# Patient Record
Sex: FEMALE | Race: BLACK OR AFRICAN AMERICAN | Employment: UNEMPLOYED | URBAN - METROPOLITAN AREA
[De-identification: names, ages, dates, MRNs, and addresses within clinical notes are randomized per-mention and may not be internally consistent; named-entity substitution may affect disease eponyms.]

---

## 2017-01-04 ENCOUNTER — OFFICE VISIT (OUTPATIENT)
Dept: NEUROLOGY | Age: 32
End: 2017-01-04

## 2017-01-04 VITALS
DIASTOLIC BLOOD PRESSURE: 82 MMHG | HEIGHT: 65 IN | WEIGHT: 198 LBS | SYSTOLIC BLOOD PRESSURE: 140 MMHG | RESPIRATION RATE: 20 BRPM | BODY MASS INDEX: 32.99 KG/M2

## 2017-01-04 DIAGNOSIS — G89.29 CHRONIC BILATERAL LOW BACK PAIN WITHOUT SCIATICA: Primary | ICD-10-CM

## 2017-01-04 DIAGNOSIS — M54.50 CHRONIC BILATERAL LOW BACK PAIN WITHOUT SCIATICA: Primary | ICD-10-CM

## 2017-01-04 RX ORDER — OXYCODONE HYDROCHLORIDE 5 MG/1
TABLET ORAL
Qty: 40 TAB | Refills: 0 | Status: SHIPPED | OUTPATIENT
Start: 2017-01-04 | End: 2017-02-01 | Stop reason: SDUPTHER

## 2017-01-04 NOTE — MR AVS SNAPSHOT
Visit Information Date & Time Provider Department Dept. Phone Encounter #  
 1/4/2017 11:00 AM Ethan Lao MD Neurology [place] [address] [phone] [id number] Upcoming Health Maintenance Date Due DTaP/Tdap/Td series (1 - Tdap) 2/17/2007 Pneumococcal 19-64 Highest Risk (2 of 3 - PCV13) 10/1/2010 PAP AKA CERVICAL CYTOLOGY 4/13/2019 Allergies as of 1/4/2017  Review Complete On: 1/4/2017 By: Mando Mackey LPN Severity Noted Reaction Type Reactions Compazine [Prochlorperazine Edisylate] High 07/11/2016   Systemic Nausea and Vomiting, Palpitations Current Immunizations  Reviewed on 9/19/2014 Name Date Influenza Vaccine 9/9/2012 Influenza Vaccine Split 9/1/2011 Pneumococcal Vaccine (Unspecified Type) 10/1/2009 Not reviewed this visit You Were Diagnosed With   
  
 Codes Comments Chronic bilateral low back pain without sciatica    -  Primary ICD-10-CM: M54.5, G89.29 ICD-9-CM: 724.2, 338.29 Vitals BP Resp Height(growth percentile) Weight(growth percentile) BMI OB Status 140/82 20 5' 5\" (1.651 m) 198 lb (89.8 kg) 32.95 kg/m2 Having regular periods Smoking Status Never Smoker Vitals History BMI and BSA Data Body Mass Index Body Surface Area 32.95 kg/m 2 2.03 m 2 Preferred Pharmacy Pharmacy Name Phone [address], [address] Your Updated Medication List  
  
   
This list is accurate as of: 1/4/17 11:37 AM.  Always use your most recent med list.  
  
  
  
  
 allopurinol 100 mg tablet Commonly known as:  Reji Mcintosh Take 100 mg by mouth daily. amLODIPine 10 mg tablet Commonly known as:  Freedom Trevizo Take 1 Tab by mouth daily. AURYXIA 210 mg iron tablet Generic drug:  ferric citrate TK 2 TS PO WITH MEALS  
  
 calcitRIOL 0.25 mcg capsule Commonly known as:  ROCALTROL Take 0.25 mcg by mouth daily. cloNIDine HCl 0.3 mg tablet Commonly known as:  CATAPRES Take 1 Tab by mouth two (2) times a day. For blood pressure  
  
 cyanocobalamin 2,500 mcg sublingual tablet Commonly known as:  VITAMIN B-12 Take 1 Tab by mouth daily. furosemide 40 mg tablet Commonly known as:  LASIX Take 1 Tab by mouth daily. For fluid  
  
 losartan 50 mg tablet Commonly known as:  COZAAR Take 1 Tab by mouth daily. For blood pressure  
  
 metoprolol succinate 100 mg tablet Commonly known as:  TOPROL-XL Take 1 Tab by mouth daily. For blood pressure  
  
 ondansetron hcl 8 mg tablet Commonly known as:  Carter Collet Take 1 Tab by mouth every eight (8) hours as needed. For Nausea  Indications: GASTROPARESIS  
  
 oxyCODONE IR 5 mg immediate release tablet Commonly known as:  Chares Ravel Take 1 tab in AM and HALF to 1 tab in PM if needed on days where back pain is severe  
  
 pantoprazole 40 mg tablet Commonly known as:  PROTONIX Take 1 Tab by mouth Daily (before breakfast). PLAQUENIL 200 mg tablet Generic drug:  hydroxychloroquine Take 200 mg by mouth two (2) times a day. predniSONE 10 mg tablet Commonly known as:  Mary Richard Take 9 mg by mouth daily (with breakfast). Senna 8.6 mg tablet Generic drug:  senna Take 1 Tab by mouth daily as needed. SENSIPAR 30 mg tablet Generic drug:  cinacalcet Take 30 mg by mouth daily. VITAMIN D2 50,000 unit capsule Generic drug:  ergocalciferol Take 50,000 Units by mouth every seven (7) days. warfarin 1 mg tablet Commonly known as:  COUMADIN Take 2 Tabs by mouth every evening. Starting Friday, 7/15/16  Indications: DEEP VEIN THROMBOSIS PREVENTION Prescriptions Printed Refills  
 oxyCODONE IR (ROXICODONE) 5 mg immediate release tablet 0 Sig: Take 1 tab in AM and HALF to 1 tab in PM if needed on days where back pain is severe 
   
 Class: Print Patient Instructions Chuy Farley 1721 What is a living will? A living will is a legal form you use to write down the kind of care you want at the end of your life. It is used by the health professionals who will treat you if you aren't able to decide for yourself. If you put your wishes in writing, your loved ones and others will know what kind of care you want. They won't need to guess. This can ease your mind and be helpful to others. A living will is not the same as an estate or property will. An estate will explains what you want to happen with your money and property after you die. Is a living will a legal document? A living will is a legal document. Each state has its own laws about living reza. If you move to another state, make sure that your living will is legal in the state where you now live. Or you might use a universal form that has been approved by many states. This kind of form can sometimes be completed and stored online. Your electronic copy will then be available wherever you have a connection to the Internet. In most cases, doctors will respect your wishes even if you have a form from a different state. · You don't need an  to complete a living will. But legal advice can be helpful if your state's laws are unclear, your health history is complicated, or your family can't agree on what should be in your living will. · You can change your living will at any time. Some people find that their wishes about end-of-life care change as their health changes. · In addition to making a living will, think about completing a medical power of  form. This form lets you name the person you want to make end-of-life treatment decisions for you (your \"health care agent\") if you're not able to. Many hospitals and nursing homes will give you the forms you need to complete a living will and a medical power of . · Your living will is used only if you can't make or communicate decisions for yourself anymore. If you become able to make decisions again, you can accept or refuse any treatment, no matter what you wrote in your living will. · Your state may offer an online registry. This is a place where you can store your living will online so the doctors and nurses who need to treat you can find it right away. What should you think about when creating a living will? Talk about your end-of-life wishes with your family members and your doctor. Let them know what you want. That way the people making decisions for you won't be surprised by your choices. Think about these questions as you make your living will: · Do you know enough about life support methods that might be used? If not, talk to your doctor so you know what might be done if you can't breathe on your own, your heart stops, or you're unable to swallow. · What things would you still want to be able to do after you receive life-support methods? Would you want to be able to walk? To speak? To eat on your own? To live without the help of machines? · If you have a choice, where do you want to be cared for? In your home? At a hospital or nursing home? · Do you want certain Uatsdin practices performed if you become very ill? · If you have a choice at the end of your life, where would you prefer to die? At home? In a hospital or nursing home? Somewhere else? · Would you prefer to be buried or cremated? · Do you want your organs to be donated after you die? What should you do with your living will? · Make sure that your family members and your health care agent have copies of your living will. · Give your doctor a copy of your living will to keep in your medical record. If you have more than one doctor, make sure that each one has a copy. · You may want to put a copy of your living will where it can be easily found. Where can you learn more? Go to http://jorge-rochelle.info/. Enter K785 in the search box to learn more about \"Learning About Living Jeanne. \" Current as of: February 24, 2016 Content Version: 11.1 © 5356-1978 [employer]. Care instructions adapted under license by [employer] (which disclaims liability or warranty for this information). If you have questions about a medical condition or this instruction, always ask your healthcare professional. [address] any warranty or liability for your use of this information. Advance Directives: Care Instructions Your Care Instructions An advance directive is a legal way to state your wishes at the end of your life. It tells your family and your doctor what to do if you can no longer say what you want. There are two main types of advance directives. You can change them any time that your wishes change. · A living will tells your family and your doctor your wishes about life support and other treatment. · A medical power of  lets you name a person to make treatment decisions for you when you can't speak for yourself. This person is called a health care agent. If you do not have an advance directive, decisions about your medical care may be made by a doctor or a  who doesn't know you. It may help to think of an advance directive as a gift to the people who care for you. If you have one, they won't have to make tough decisions by themselves. Follow-up care is a key part of your treatment and safety. Be sure to make and go to all appointments, and call your doctor if you are having problems. It's also a good idea to know your test results and keep a list of the medicines you take. How can you care for yourself at home? · Discuss your wishes with your loved ones and your doctor. This way, there are no surprises. · Many states have a unique form.  Or you might use a universal form that has been approved by many states. This kind of form can sometimes be completed and stored online. Your electronic copy will then be available wherever you have a connection to the Internet. In most cases, doctors will respect your wishes even if you have a form from a different state. · You don't need a  to do an advance directive. But you may want to get legal advice. · Think about these questions when you prepare an advance directive: ¨ Who do you want to make decisions about your medical care if you are not able to? Many people choose a family member, close friend, or doctor. ¨ Do you know enough about life support methods that might be used? If not, talk to your doctor so you understand. ¨ What are you most afraid of that might happen? You might be afraid of having pain, losing your independence, or being kept alive by machines. ¨ Where would you prefer to die? Choices include your home, a hospital, or a nursing home. ¨ Would you like to have information about hospice care to support you and your family? ¨ Do you want to donate organs when you die? ¨ Do you want certain Yarsanism practices performed before you die? If so, put your wishes in the advance directive. · Read your advance directive every year, and make changes as needed. When should you call for help? Be sure to contact your doctor if you have any questions. Where can you learn more? Go to http://jorge-rochelle.info/. Enter R264 in the search box to learn more about \"Advance Directives: Care Instructions. \" Current as of: February 24, 2016 Content Version: 11.1 © 0196-6225 [employer]. Care instructions adapted under license by [employer] (which disclaims liability or warranty for this information).  If you have questions about a medical condition or this instruction, always ask your healthcare professional. [address] any warranty or liability for your use of this information. Introducing [facility]! Dalila Junior introduces [employer] patient portal. Now you can access parts of your medical record, email your doctor's office, and request medication refills online. 1. In your internet browser, go to [email or web address] [email or web address] Ge[employer] 2. Click on the First Time User? Click Here link in the Sign In box. You will see the New Member Sign Up page. 3. Enter your [employer] Access Code exactly as it appears below. You will not need to use this code after youve completed the sign-up process. If you do not sign up before the expiration date, you must request a new code. · [employer] Access Code: [email or web address] Expires: 4/4/2017 11:37 AM 
 
4. Enter the last four digits of your Social Security Number (xxxx) and Date of Birth (mm/dd/yyyy) as indicated and click Submit. You will be taken to the next sign-up page. 5. Create a [employer] ID. This will be your [employer] login ID and cannot be changed, so think of one that is secure and easy to remember. 6. Create a [employer] password. You can change your password at any time. 7. Enter your Password Reset Question and Answer. This can be used at a later time if you forget your password. 8. Enter your e-mail address. You will receive e-mail notification when new information is available in [address] 9. Click Sign Up. You can now view and download portions of your medical record. 10. Click the Download Summary menu link to download a portable copy of your medical information. If you have questions, please visit the Frequently Asked Questions section of the [employer] website. Remember, [employer] is NOT to be used for urgent needs. For medical emergencies, dial 911. Now available from your iPhone and Android! Please provide this summary of care documentation to your next provider. Your primary care clinician is listed as Patti Rodriguez.  If you have any questions after today's visit, please call [phone].

## 2017-01-04 NOTE — PROGRESS NOTES
Interval HPI:     This is a 28 y.o. female who is following up for     PMHx: chronic back pain, hx of fibromyalgia, hx of DVT/ coumadin, hx of Lupus, mild lower lumbar disc degeneration (L4-5 and L5-S1 with small annular tear at L5-S1)    Chief Complaint   Patient presents with    Pain (Chronic)     Last visit, changed from Oxycodone IR 5 mg to Percocet 5/ 325. She says the combination makes her sleepy and she noticed the same thing about taking tylenol in the past.  Says the pain was similarly controlled as compared to the Oxycodone IR 5 mg tablet. Ran out of last Rx 2 days ago. Taking 1 tabs most days, occasionally taking an extra HALF to 1 tab. UDS checked at last visit; consistent with rx pain medications.  reviewed: only filling pain Rx from my office. Tried/ failed: Gabapentin, Cymbalta, Amitriptyline (only helped get to sleep), Lyrica (abnormal behaviors)    Brief ROS: as above  There have been no significant changes in PMHx, PSHx, SHx except as noted above. Allergies   Allergen Reactions    Compazine [Prochlorperazine Edisylate] Nausea and Vomiting and Palpitations     Current Outpatient Prescriptions   Medication Sig Dispense Refill    oxyCODONE IR (ROXICODONE) 5 mg immediate release tablet Take 1 tab in AM and HALF to 1 tab in PM if needed on days where back pain is severe    40 Tab 0    amLODIPine (NORVASC) 10 mg tablet Take 1 Tab by mouth daily. 30 Tab 5    losartan (COZAAR) 50 mg tablet Take 1 Tab by mouth daily. For blood pressure 30 Tab 5    cloNIDine HCl (CATAPRES) 0.3 mg tablet Take 1 Tab by mouth two (2) times a day. For blood pressure 60 Tab 5    metoprolol succinate (TOPROL-XL) 100 mg tablet Take 1 Tab by mouth daily. For blood pressure 30 Tab 5    furosemide (LASIX) 40 mg tablet Take 1 Tab by mouth daily. For fluid 30 Tab 5    cinacalcet (SENSIPAR) 30 mg tablet Take 30 mg by mouth daily.       AURYXIA 210 mg iron tablet TK 2 TS PO WITH MEALS  12    predniSONE (DELTASONE) 10 mg tablet Take 9 mg by mouth daily (with breakfast).  ergocalciferol (VITAMIN D2) 50,000 unit capsule Take 50,000 Units by mouth every seven (7) days.  hydroxychloroquine (PLAQUENIL) 200 mg tablet Take 200 mg by mouth two (2) times a day.  warfarin (COUMADIN) 1 mg tablet Take 2 Tabs by mouth every evening. Starting Friday, 7/15/16  Indications: DEEP VEIN THROMBOSIS PREVENTION 60 Tab 0    ondansetron hcl (ZOFRAN) 8 mg tablet Take 1 Tab by mouth every eight (8) hours as needed. For Nausea  Indications: GASTROPARESIS 15 Tab 1    allopurinol (ZYLOPRIM) 100 mg tablet Take 100 mg by mouth daily.  cyanocobalamin (VITAMIN B-12) 2,500 mcg sublingual tablet Take 1 Tab by mouth daily. 90 Tab 1    pantoprazole (PROTONIX) 40 mg tablet Take 1 Tab by mouth Daily (before breakfast). 30 Tab 0    calcitRIOL (ROCALTROL) 0.25 mcg capsule Take 0.25 mcg by mouth daily. 5    senna (SENNA) 8.6 mg tablet Take 1 Tab by mouth daily as needed. Physical Exam  Blood pressure 140/82, resp. rate 20, height 5' 5\" (1.651 m), weight 89.8 kg (198 lb). No acute distress  CV: regular rate, rhythm  Lungs: CTA    MSK:  Lumbar spine:   + tenderness across lower back   Mild pain with back flexion and extension  No significant worsening with facet loading to either sides     Focused Neurological Exam     Mental status: Alert and oriented to person, place situation  CNs: EOMI, Face symmetric, Hearing/ Language normal  Sensory: intact light touch in both legs  Motor: Normal bulk and strength in all 4 extremities. Reflexes: Patellars 1+ symmetric  Gait: antalgic    Impression    ICD-10-CM ICD-9-CM    1. Chronic bilateral low back pain without sciatica M54.5 724.2 oxyCODONE IR (ROXICODONE) 5 mg immediate release tablet    G89.29 338.29        Changed back to Oxycodone IR 5 mg tablet (#40) from Percocet 5/ 325 due to complaints of excessive sedation on Percocet.   Pt advised that she can take 1 tab in AM and occasionally HALF to one tab in evenings if pain is severe.    F/u in 4 weeks

## 2017-01-04 NOTE — PROGRESS NOTES
Reviewed record in preparation for visit and have necessary documentation  Pt did not bring medication to office visit for review  Medication list reviewed and reconciled with patient  Information was given to pt on Advanced Directives, Living Will  opportunity was given for questions  Pill count =      Patient didn't bring medication bottle to visit    Patient reports taking medication  Take 1 tab in AM and HALF to 1 tab in PM if needed on days where back pain is severe  UDS obtained and sent = no  Pain contract = on file   made available for  Dr Tyrus Goldmann

## 2017-01-04 NOTE — PATIENT INSTRUCTIONS
Learning About Living Jeanne  What is a living will? A living will is a legal form you use to write down the kind of care you want at the end of your life. It is used by the health professionals who will treat you if you aren't able to decide for yourself. If you put your wishes in writing, your loved ones and others will know what kind of care you want. They won't need to guess. This can ease your mind and be helpful to others. A living will is not the same as an estate or property will. An estate will explains what you want to happen with your money and property after you die. Is a living will a legal document? A living will is a legal document. Each state has its own laws about living reza. If you move to another state, make sure that your living will is legal in the state where you now live. Or you might use a universal form that has been approved by many states. This kind of form can sometimes be completed and stored online. Your electronic copy will then be available wherever you have a connection to the Internet. In most cases, doctors will respect your wishes even if you have a form from a different state. · You don't need an  to complete a living will. But legal advice can be helpful if your state's laws are unclear, your health history is complicated, or your family can't agree on what should be in your living will. · You can change your living will at any time. Some people find that their wishes about end-of-life care change as their health changes. · In addition to making a living will, think about completing a medical power of  form. This form lets you name the person you want to make end-of-life treatment decisions for you (your \"health care agent\") if you're not able to. Many hospitals and nursing homes will give you the forms you need to complete a living will and a medical power of .   · Your living will is used only if you can't make or communicate decisions for yourself anymore. If you become able to make decisions again, you can accept or refuse any treatment, no matter what you wrote in your living will. · Your state may offer an online registry. This is a place where you can store your living will online so the doctors and nurses who need to treat you can find it right away. What should you think about when creating a living will? Talk about your end-of-life wishes with your family members and your doctor. Let them know what you want. That way the people making decisions for you won't be surprised by your choices. Think about these questions as you make your living will:  · Do you know enough about life support methods that might be used? If not, talk to your doctor so you know what might be done if you can't breathe on your own, your heart stops, or you're unable to swallow. · What things would you still want to be able to do after you receive life-support methods? Would you want to be able to walk? To speak? To eat on your own? To live without the help of machines? · If you have a choice, where do you want to be cared for? In your home? At a hospital or nursing home? · Do you want certain Rastafari practices performed if you become very ill? · If you have a choice at the end of your life, where would you prefer to die? At home? In a hospital or nursing home? Somewhere else? · Would you prefer to be buried or cremated? · Do you want your organs to be donated after you die? What should you do with your living will? · Make sure that your family members and your health care agent have copies of your living will. · Give your doctor a copy of your living will to keep in your medical record. If you have more than one doctor, make sure that each one has a copy. · You may want to put a copy of your living will where it can be easily found. Where can you learn more? Go to http://jorge-rochelle.info/.   Enter S797 in the search box to learn more about \"Learning About Living Constancia Person. \"  Current as of: February 24, 2016  Content Version: 11.1  © 6120-8901 [employer]. Care instructions adapted under license by [employer] (which disclaims liability or warranty for this information). If you have questions about a medical condition or this instruction, always ask your healthcare professional. [address] any warranty or liability for your use of this information. Advance Directives: Care Instructions  Your Care Instructions  An advance directive is a legal way to state your wishes at the end of your life. It tells your family and your doctor what to do if you can no longer say what you want. There are two main types of advance directives. You can change them any time that your wishes change. · A living will tells your family and your doctor your wishes about life support and other treatment. · A medical power of  lets you name a person to make treatment decisions for you when you can't speak for yourself. This person is called a health care agent. If you do not have an advance directive, decisions about your medical care may be made by a doctor or a  who doesn't know you. It may help to think of an advance directive as a gift to the people who care for you. If you have one, they won't have to make tough decisions by themselves. Follow-up care is a key part of your treatment and safety. Be sure to make and go to all appointments, and call your doctor if you are having problems. It's also a good idea to know your test results and keep a list of the medicines you take. How can you care for yourself at home? · Discuss your wishes with your loved ones and your doctor. This way, there are no surprises. · Many states have a unique form. Or you might use a universal form that has been approved by many states. This kind of form can sometimes be completed and stored online.  Your electronic copy will then be available wherever you have a connection to the Internet. In most cases, doctors will respect your wishes even if you have a form from a different state. · You don't need a  to do an advance directive. But you may want to get legal advice. · Think about these questions when you prepare an advance directive:  ¨ Who do you want to make decisions about your medical care if you are not able to? Many people choose a family member, close friend, or doctor. ¨ Do you know enough about life support methods that might be used? If not, talk to your doctor so you understand. ¨ What are you most afraid of that might happen? You might be afraid of having pain, losing your independence, or being kept alive by machines. ¨ Where would you prefer to die? Choices include your home, a hospital, or a nursing home. ¨ Would you like to have information about hospice care to support you and your family? ¨ Do you want to donate organs when you die? ¨ Do you want certain Scientologist practices performed before you die? If so, put your wishes in the advance directive. · Read your advance directive every year, and make changes as needed. When should you call for help? Be sure to contact your doctor if you have any questions. Where can you learn more? Go to http://jorge-rochelle.info/. Enter R264 in the search box to learn more about \"Advance Directives: Care Instructions. \"  Current as of: February 24, 2016  Content Version: 11.1  © 5810-0768 [employer]. Care instructions adapted under license by [employer] (which disclaims liability or warranty for this information). If you have questions about a medical condition or this instruction, always ask your healthcare professional. [address] any warranty or liability for your use of this information.

## 2017-01-09 ENCOUNTER — DOCUMENTATION ONLY (OUTPATIENT)
Dept: FAMILY MEDICINE CLINIC | Age: 32
End: 2017-01-09

## 2017-01-09 NOTE — PROGRESS NOTES
Fax received from Rx Work completed, signed, dated and faxed back to [phone]. Forms and fax confirmation sent to scan.

## 2017-02-01 ENCOUNTER — PATIENT OUTREACH (OUTPATIENT)
Dept: FAMILY MEDICINE CLINIC | Age: 32
End: 2017-02-01

## 2017-02-01 ENCOUNTER — OFFICE VISIT (OUTPATIENT)
Dept: NEUROLOGY | Age: 32
End: 2017-02-01

## 2017-02-01 VITALS
WEIGHT: 198 LBS | BODY MASS INDEX: 32.99 KG/M2 | HEIGHT: 65 IN | RESPIRATION RATE: 20 BRPM | SYSTOLIC BLOOD PRESSURE: 140 MMHG | DIASTOLIC BLOOD PRESSURE: 70 MMHG

## 2017-02-01 DIAGNOSIS — G89.29 CHRONIC BILATERAL LOW BACK PAIN WITHOUT SCIATICA: Primary | ICD-10-CM

## 2017-02-01 DIAGNOSIS — M54.50 CHRONIC BILATERAL LOW BACK PAIN WITHOUT SCIATICA: Primary | ICD-10-CM

## 2017-02-01 RX ORDER — OXYCODONE HYDROCHLORIDE 5 MG/1
TABLET ORAL
Qty: 40 TAB | Refills: 0 | Status: SHIPPED | OUTPATIENT
Start: 2017-02-01 | End: 2017-03-01 | Stop reason: SDUPTHER

## 2017-02-01 NOTE — MR AVS SNAPSHOT
Visit Information Date & Time Provider Department Dept. Phone Encounter #  
 2/1/2017  8:40 AM Selam Junior MD Neurology [facility] [phone] [id number] Upcoming Health Maintenance Date Due DTaP/Tdap/Td series (1 - Tdap) 2/17/2007 Pneumococcal 19-64 Highest Risk (2 of 3 - PCV13) 10/1/2010 PAP AKA CERVICAL CYTOLOGY 4/13/2019 Allergies as of 2/1/2017  Review Complete On: 1/4/2017 By: Daisy Mendoza LPN Severity Noted Reaction Type Reactions Compazine [Prochlorperazine Edisylate] High 07/11/2016   Systemic Nausea and Vomiting, Palpitations Current Immunizations  Reviewed on 9/19/2014 Name Date Influenza Vaccine 9/9/2012 Influenza Vaccine Split 9/1/2011 Pneumococcal Vaccine (Unspecified Type) 10/1/2009 Not reviewed this visit You Were Diagnosed With   
  
 Codes Comments Chronic bilateral low back pain without sciatica    -  Primary ICD-10-CM: M54.5, G89.29 ICD-9-CM: 724.2, 338.29 Vitals BP Resp Height(growth percentile) Weight(growth percentile) BMI OB Status 140/70 20 5' 5\" (1.651 m) 198 lb (89.8 kg) 32.95 kg/m2 Having regular periods Smoking Status Never Smoker Vitals History BMI and BSA Data Body Mass Index Body Surface Area 32.95 kg/m 2 2.03 m 2 Preferred Pharmacy Pharmacy Name Phone [address], [address] Your Updated Medication List  
  
   
This list is accurate as of: 2/1/17  9:17 AM.  Always use your most recent med list.  
  
  
  
  
 allopurinol 100 mg tablet Commonly known as:  Acquanetta Bunk Take 100 mg by mouth daily. amLODIPine 10 mg tablet Commonly known as:  Paiz Del Take 1 Tab by mouth daily. AURYXIA 210 mg iron tablet Generic drug:  ferric citrate TK 2 TS PO WITH MEALS  
  
 calcitRIOL 0.25 mcg capsule Commonly known as:  ROCALTROL Take 0.25 mcg by mouth daily. cloNIDine HCl 0.3 mg tablet Commonly known as:  CATAPRES Take 1 Tab by mouth two (2) times a day. For blood pressure  
  
 cyanocobalamin 2,500 mcg sublingual tablet Commonly known as:  VITAMIN B-12 Take 1 Tab by mouth daily. furosemide 40 mg tablet Commonly known as:  LASIX Take 1 Tab by mouth daily. For fluid  
  
 losartan 50 mg tablet Commonly known as:  COZAAR Take 1 Tab by mouth daily. For blood pressure  
  
 metoprolol succinate 100 mg tablet Commonly known as:  TOPROL-XL Take 1 Tab by mouth daily. For blood pressure  
  
 ondansetron hcl 8 mg tablet Commonly known as:  Leopoldo Marus Take 1 Tab by mouth every eight (8) hours as needed. For Nausea  Indications: GASTROPARESIS  
  
 oxyCODONE IR 5 mg immediate release tablet Commonly known as:  Genie [place] Take 1 tab in AM and HALF to 1 tab in PM if needed on days where back pain is severe  
  
 pantoprazole 40 mg tablet Commonly known as:  PROTONIX Take 1 Tab by mouth Daily (before breakfast). PLAQUENIL 200 mg tablet Generic drug:  hydroxychloroquine Take 200 mg by mouth two (2) times a day. predniSONE 10 mg tablet Commonly known as:  Cecilia Charline Take 9 mg by mouth daily (with breakfast). Senna 8.6 mg tablet Generic drug:  senna Take 1 Tab by mouth daily as needed. SENSIPAR 30 mg tablet Generic drug:  cinacalcet Take 30 mg by mouth daily. VITAMIN D2 50,000 unit capsule Generic drug:  ergocalciferol Take 50,000 Units by mouth every seven (7) days. warfarin 1 mg tablet Commonly known as:  COUMADIN Take 2 Tabs by mouth every evening. Starting Friday, 7/15/16  Indications: DEEP VEIN THROMBOSIS PREVENTION Prescriptions Printed Refills  
 oxyCODONE IR (ROXICODONE) 5 mg immediate release tablet 0 Sig: Take 1 tab in AM and HALF to 1 tab in PM if needed on days where back pain is severe 
   
 Class: Print Patient Instructions Chuy Farley 1721 What is a living will? A living will is a legal form you use to write down the kind of care you want at the end of your life. It is used by the health professionals who will treat you if you aren't able to decide for yourself. If you put your wishes in writing, your loved ones and others will know what kind of care you want. They won't need to guess. This can ease your mind and be helpful to others. A living will is not the same as an estate or property will. An estate will explains what you want to happen with your money and property after you die. Is a living will a legal document? A living will is a legal document. Each state has its own laws about living reza. If you move to another state, make sure that your living will is legal in the state where you now live. Or you might use a universal form that has been approved by many states. This kind of form can sometimes be completed and stored online. Your electronic copy will then be available wherever you have a connection to the Internet. In most cases, doctors will respect your wishes even if you have a form from a different state. · You don't need an  to complete a living will. But legal advice can be helpful if your state's laws are unclear, your health history is complicated, or your family can't agree on what should be in your living will. · You can change your living will at any time. Some people find that their wishes about end-of-life care change as their health changes. · In addition to making a living will, think about completing a medical power of  form. This form lets you name the person you want to make end-of-life treatment decisions for you (your \"health care agent\") if you're not able to. Many hospitals and nursing homes will give you the forms you need to complete a living will and a medical power of . · Your living will is used only if you can't make or communicate decisions for yourself anymore. If you become able to make decisions again, you can accept or refuse any treatment, no matter what you wrote in your living will. · Your state may offer an online registry. This is a place where you can store your living will online so the doctors and nurses who need to treat you can find it right away. What should you think about when creating a living will? Talk about your end-of-life wishes with your family members and your doctor. Let them know what you want. That way the people making decisions for you won't be surprised by your choices. Think about these questions as you make your living will: · Do you know enough about life support methods that might be used? If not, talk to your doctor so you know what might be done if you can't breathe on your own, your heart stops, or you're unable to swallow. · What things would you still want to be able to do after you receive life-support methods? Would you want to be able to walk? To speak? To eat on your own? To live without the help of machines? · If you have a choice, where do you want to be cared for? In your home? At a hospital or nursing home? · Do you want certain Caodaism practices performed if you become very ill? · If you have a choice at the end of your life, where would you prefer to die? At home? In a hospital or nursing home? Somewhere else? · Would you prefer to be buried or cremated? · Do you want your organs to be donated after you die? What should you do with your living will? · Make sure that your family members and your health care agent have copies of your living will. · Give your doctor a copy of your living will to keep in your medical record. If you have more than one doctor, make sure that each one has a copy. · You may want to put a copy of your living will where it can be easily found. Where can you learn more? Go to http://jorge-rochelle.info/. Enter H966 in the search box to learn more about \"Learning About Living Jeanne. \" Current as of: February 24, 2016 Content Version: 11.1 © 5429-6690 [employer]. Care instructions adapted under license by [employer] (which disclaims liability or warranty for this information). If you have questions about a medical condition or this instruction, always ask your healthcare professional. [address] any warranty or liability for your use of this information. Advance Directives: Care Instructions Your Care Instructions An advance directive is a legal way to state your wishes at the end of your life. It tells your family and your doctor what to do if you can no longer say what you want. There are two main types of advance directives. You can change them any time that your wishes change. · A living will tells your family and your doctor your wishes about life support and other treatment. · A medical power of  lets you name a person to make treatment decisions for you when you can't speak for yourself. This person is called a health care agent. If you do not have an advance directive, decisions about your medical care may be made by a doctor or a  who doesn't know you. It may help to think of an advance directive as a gift to the people who care for you. If you have one, they won't have to make tough decisions by themselves. Follow-up care is a key part of your treatment and safety. Be sure to make and go to all appointments, and call your doctor if you are having problems. It's also a good idea to know your test results and keep a list of the medicines you take. How can you care for yourself at home? · Discuss your wishes with your loved ones and your doctor. This way, there are no surprises. · Many states have a unique form.  Or you might use a universal form that has been approved by many states. This kind of form can sometimes be completed and stored online. Your electronic copy will then be available wherever you have a connection to the Internet. In most cases, doctors will respect your wishes even if you have a form from a different state. · You don't need a  to do an advance directive. But you may want to get legal advice. · Think about these questions when you prepare an advance directive: ¨ Who do you want to make decisions about your medical care if you are not able to? Many people choose a family member, close friend, or doctor. ¨ Do you know enough about life support methods that might be used? If not, talk to your doctor so you understand. ¨ What are you most afraid of that might happen? You might be afraid of having pain, losing your independence, or being kept alive by machines. ¨ Where would you prefer to die? Choices include your home, a hospital, or a nursing home. ¨ Would you like to have information about hospice care to support you and your family? ¨ Do you want to donate organs when you die? ¨ Do you want certain Mandaeism practices performed before you die? If so, put your wishes in the advance directive. · Read your advance directive every year, and make changes as needed. When should you call for help? Be sure to contact your doctor if you have any questions. Where can you learn more? Go to http://jorge-rochelle.info/. Enter R264 in the search box to learn more about \"Advance Directives: Care Instructions. \" Current as of: February 24, 2016 Content Version: 11.1 © 2849-3404 [employer]. Care instructions adapted under license by [employer] (which disclaims liability or warranty for this information).  If you have questions about a medical condition or this instruction, always ask your healthcare professional. [address] any warranty or liability for your use of this information. Introducing [facility] [place] [facility]! [employer] introduces [employer] patient portal. Now you can access parts of your medical record, email your doctor's office, and request medication refills online. 1. In your internet browser, go to [email or web address] 2. Click on the First Time User? Click Here link in the Sign In box. You will see the New Member Sign Up page. 3. Enter your [employer] Access Code exactly as it appears below. You will not need to use this code after youve completed the sign-up process. If you do not sign up before the expiration date, you must request a new code. · [employer] Access Code: [email or web address] Expires: 4/4/2017 11:37 AM 
 
4. Enter the last four digits of your Social Security Number (xxxx) and Date of Birth (mm/dd/yyyy) as indicated and click Submit. You will be taken to the next sign-up page. 5. Create a [employer] ID. This will be your [employer] login ID and cannot be changed, so think of one that is secure and easy to remember. 6. Create a [employer] password. You can change your password at any time. 7. Enter your Password Reset Question and Answer. This can be used at a later time if you forget your password. 8. Enter your e-mail address. You will receive e-mail notification when new information is available in [address] 9. Click Sign Up. You can now view and download portions of your medical record. 10. Click the Download Summary menu link to download a portable copy of your medical information. If you have questions, please visit the Frequently Asked Questions section of the [employer] website. Remember, [employer] is NOT to be used for urgent needs. For medical emergencies, dial 911. Now available from your iPhone and Android! Please provide this summary of care documentation to your next provider. Your primary care clinician is listed as Paola Dyson.  If you have any questions after today's visit, please call [phone].

## 2017-02-01 NOTE — PROGRESS NOTES
Pill count =      Patient didn't bring  UDS obtained and sent =  Pain contract =on file   made available for Dr Stefani Akers

## 2017-02-01 NOTE — PROGRESS NOTES
Patient on [facility] patient list. Last appointment in our office was 10/7/16. NN reviewed chart. Called patient today re scheduling a follow up appointment since bp was so high at last appointment. Says she is now on home dialysis and sees nephrologist twice a month. She says bp running much better now, she takes it everyday and nephrologist monitors it as well.   Did schedule appt to f/u with KHANH Caballero for 2/21 at 11:30am.

## 2017-02-01 NOTE — PATIENT INSTRUCTIONS
Learning About Living Jeanne  What is a living will? A living will is a legal form you use to write down the kind of care you want at the end of your life. It is used by the health professionals who will treat you if you aren't able to decide for yourself. If you put your wishes in writing, your loved ones and others will know what kind of care you want. They won't need to guess. This can ease your mind and be helpful to others. A living will is not the same as an estate or property will. An estate will explains what you want to happen with your money and property after you die. Is a living will a legal document? A living will is a legal document. Each state has its own laws about living reza. If you move to another state, make sure that your living will is legal in the state where you now live. Or you might use a universal form that has been approved by many states. This kind of form can sometimes be completed and stored online. Your electronic copy will then be available wherever you have a connection to the Internet. In most cases, doctors will respect your wishes even if you have a form from a different state. · You don't need an  to complete a living will. But legal advice can be helpful if your state's laws are unclear, your health history is complicated, or your family can't agree on what should be in your living will. · You can change your living will at any time. Some people find that their wishes about end-of-life care change as their health changes. · In addition to making a living will, think about completing a medical power of  form. This form lets you name the person you want to make end-of-life treatment decisions for you (your \"health care agent\") if you're not able to. Many hospitals and nursing homes will give you the forms you need to complete a living will and a medical power of .   · Your living will is used only if you can't make or communicate decisions for yourself anymore. If you become able to make decisions again, you can accept or refuse any treatment, no matter what you wrote in your living will. · Your state may offer an online registry. This is a place where you can store your living will online so the doctors and nurses who need to treat you can find it right away. What should you think about when creating a living will? Talk about your end-of-life wishes with your family members and your doctor. Let them know what you want. That way the people making decisions for you won't be surprised by your choices. Think about these questions as you make your living will:  · Do you know enough about life support methods that might be used? If not, talk to your doctor so you know what might be done if you can't breathe on your own, your heart stops, or you're unable to swallow. · What things would you still want to be able to do after you receive life-support methods? Would you want to be able to walk? To speak? To eat on your own? To live without the help of machines? · If you have a choice, where do you want to be cared for? In your home? At a hospital or nursing home? · Do you want certain Hinduism practices performed if you become very ill? · If you have a choice at the end of your life, where would you prefer to die? At home? In a hospital or nursing home? Somewhere else? · Would you prefer to be buried or cremated? · Do you want your organs to be donated after you die? What should you do with your living will? · Make sure that your family members and your health care agent have copies of your living will. · Give your doctor a copy of your living will to keep in your medical record. If you have more than one doctor, make sure that each one has a copy. · You may want to put a copy of your living will where it can be easily found. Where can you learn more? Go to http://jorge-rochelle.info/.   Enter L793 in the search box to learn more about \"Learning About Living Jeanne. \"  Current as of: February 24, 2016  Content Version: 11.1  © 8768-8494 [email or web address]. Care instructions adapted under license by [employer] (which disclaims liability or warranty for this information). If you have questions about a medical condition or this instruction, always ask your healthcare professional. [address] any warranty or liability for your use of this information. Advance Directives: Care Instructions  Your Care Instructions  An advance directive is a legal way to state your wishes at the end of your life. It tells your family and your doctor what to do if you can no longer say what you want. There are two main types of advance directives. You can change them any time that your wishes change. · A living will tells your family and your doctor your wishes about life support and other treatment. · A medical power of  lets you name a person to make treatment decisions for you when you can't speak for yourself. This person is called a health care agent. If you do not have an advance directive, decisions about your medical care may be made by a doctor or a  who doesn't know you. It may help to think of an advance directive as a gift to the people who care for you. If you have one, they won't have to make tough decisions by themselves. Follow-up care is a key part of your treatment and safety. Be sure to make and go to all appointments, and call your doctor if you are having problems. It's also a good idea to know your test results and keep a list of the medicines you take. How can you care for yourself at home? · Discuss your wishes with your loved ones and your doctor. This way, there are no surprises. · Many states have a unique form. Or you might use a universal form that has been approved by many states. This kind of form can sometimes be completed and stored online.  Your electronic copy will then be available wherever you have a connection to the Internet. In most cases, doctors will respect your wishes even if you have a form from a different state. · You don't need a  to do an advance directive. But you may want to get legal advice. · Think about these questions when you prepare an advance directive:  ¨ Who do you want to make decisions about your medical care if you are not able to? Many people choose a family member, close friend, or doctor. ¨ Do you know enough about life support methods that might be used? If not, talk to your doctor so you understand. ¨ What are you most afraid of that might happen? You might be afraid of having pain, losing your independence, or being kept alive by machines. ¨ Where would you prefer to die? Choices include your home, a hospital, or a nursing home. ¨ Would you like to have information about hospice care to support you and your family? ¨ Do you want to donate organs when you die? ¨ Do you want certain Nondenominational practices performed before you die? If so, put your wishes in the advance directive. · Read your advance directive every year, and make changes as needed. When should you call for help? Be sure to contact your doctor if you have any questions. Where can you learn more? Go to http://jorge-rochelle.info/. Enter R264 in the search box to learn more about \"Advance Directives: Care Instructions. \"  Current as of: February 24, 2016  Content Version: 11.1  © 7635-2582 [employer]. Care instructions adapted under license by [employer] (which disclaims liability or warranty for this information). If you have questions about a medical condition or this instruction, always ask your healthcare professional. [address] any warranty or liability for your use of this information.

## 2017-02-01 NOTE — PROGRESS NOTES
Interval HPI:     This is a 28 y.o. female who is following up for     PMHx: chronic back pain, hx of fibromyalgia, hx of DVT/ coumadin, hx of Lupus, mild lower lumbar disc degeneration (L4-5 and L5-S1 with small annular tear at L5-S1)    Chief Complaint   Patient presents with    Pain (Chronic)     Pain resonably well controlled, 3-4/ 10 on most days with her pain medication  Says the Oxycodone IR tablets make her less sleepy than the Percocet 5/ 325 tabs  Taking 1 tabs most days, occasionally taking an extra HALF to 1 tab.  reviewed: only filling pain Rx from my office. Tried/ failed: Gabapentin, Cymbalta, Amitriptyline (only helped get to sleep), Lyrica (abnormal behaviors)    Brief ROS: as above  There have been no significant changes in PMHx, PSHx, SHx except as noted above. Allergies   Allergen Reactions    Compazine [Prochlorperazine Edisylate] Nausea and Vomiting and Palpitations     Current Outpatient Prescriptions   Medication Sig Dispense Refill    oxyCODONE IR (ROXICODONE) 5 mg immediate release tablet Take 1 tab in AM and HALF to 1 tab in PM if needed on days where back pain is severe    40 Tab 0    amLODIPine (NORVASC) 10 mg tablet Take 1 Tab by mouth daily. 30 Tab 5    losartan (COZAAR) 50 mg tablet Take 1 Tab by mouth daily. For blood pressure 30 Tab 5    cloNIDine HCl (CATAPRES) 0.3 mg tablet Take 1 Tab by mouth two (2) times a day. For blood pressure 60 Tab 5    metoprolol succinate (TOPROL-XL) 100 mg tablet Take 1 Tab by mouth daily. For blood pressure 30 Tab 5    furosemide (LASIX) 40 mg tablet Take 1 Tab by mouth daily. For fluid 30 Tab 5    ondansetron hcl (ZOFRAN) 8 mg tablet Take 1 Tab by mouth every eight (8) hours as needed. For Nausea  Indications: GASTROPARESIS 15 Tab 1    cinacalcet (SENSIPAR) 30 mg tablet Take 30 mg by mouth daily.       AURYXIA 210 mg iron tablet TK 2 TS PO WITH MEALS  12    predniSONE (DELTASONE) 10 mg tablet Take 9 mg by mouth daily (with breakfast).  ergocalciferol (VITAMIN D2) 50,000 unit capsule Take 50,000 Units by mouth every seven (7) days.  hydroxychloroquine (PLAQUENIL) 200 mg tablet Take 200 mg by mouth two (2) times a day.  warfarin (COUMADIN) 1 mg tablet Take 2 Tabs by mouth every evening. Starting Friday, 7/15/16  Indications: DEEP VEIN THROMBOSIS PREVENTION 60 Tab 0    allopurinol (ZYLOPRIM) 100 mg tablet Take 100 mg by mouth daily.  cyanocobalamin (VITAMIN B-12) 2,500 mcg sublingual tablet Take 1 Tab by mouth daily. 90 Tab 1    pantoprazole (PROTONIX) 40 mg tablet Take 1 Tab by mouth Daily (before breakfast). 30 Tab 0    calcitRIOL (ROCALTROL) 0.25 mcg capsule Take 0.25 mcg by mouth daily. 5    senna (SENNA) 8.6 mg tablet Take 1 Tab by mouth daily as needed. Physical Exam  Blood pressure 140/70, resp. rate 20, height 5' 5\" (1.651 m), weight 89.8 kg (198 lb). No acute distress  CV: regular rate, rhythm  Lungs: CTA     MSK:  Lumbar spine:   + tenderness across lower back   Mild pain with back flexion and extension  No significant worsening with facet loading to either sides     Focused Neurological Exam     Mental status: Alert and oriented to person, place situation  CNs: EOMI, Face symmetric, Hearing/ Language normal  Sensory: intact light touch in both legs  Motor: Normal bulk and strength in all 4 extremities. Reflexes: Patellars 1+ symmetric  Gait: antalgic    Impression    ICD-10-CM ICD-9-CM    1. Chronic bilateral low back pain without sciatica M54.5 724.2 oxyCODONE IR (ROXICODONE) 5 mg immediate release tablet    G89.29 338.29      Renewed Rx Oxycodone IR 5 mg tablet (#40 tabs) to take 1 tab in AM and occasionally HALF to one tab in evenings if pain is severe.    F/u in 4 weeks

## 2017-02-21 ENCOUNTER — OFFICE VISIT (OUTPATIENT)
Dept: FAMILY MEDICINE CLINIC | Age: 32
End: 2017-02-21

## 2017-02-21 VITALS
BODY MASS INDEX: 31.12 KG/M2 | HEIGHT: 65 IN | HEART RATE: 65 BPM | WEIGHT: 186.8 LBS | SYSTOLIC BLOOD PRESSURE: 151 MMHG | DIASTOLIC BLOOD PRESSURE: 121 MMHG | RESPIRATION RATE: 12 BRPM | OXYGEN SATURATION: 96 %

## 2017-02-21 DIAGNOSIS — D68.59 HYPERCOAGULABLE STATE (HCC): ICD-10-CM

## 2017-02-21 DIAGNOSIS — N18.6 ESRD (END STAGE RENAL DISEASE) (HCC): ICD-10-CM

## 2017-02-21 DIAGNOSIS — M32.14 LUPUS NEPHRITIS (HCC): ICD-10-CM

## 2017-02-21 DIAGNOSIS — Z00.00 MEDICARE ANNUAL WELLNESS VISIT, INITIAL: ICD-10-CM

## 2017-02-21 DIAGNOSIS — I10 MALIGNANT HYPERTENSION: Primary | ICD-10-CM

## 2017-02-21 DIAGNOSIS — Z71.89 ACP (ADVANCE CARE PLANNING): ICD-10-CM

## 2017-02-21 DIAGNOSIS — N18.9 ANEMIA OF RENAL DISEASE: ICD-10-CM

## 2017-02-21 DIAGNOSIS — D63.1 ANEMIA OF RENAL DISEASE: ICD-10-CM

## 2017-02-21 DIAGNOSIS — Z99.2 DEPENDENCE ON PERITONEAL DIALYSIS (HCC): ICD-10-CM

## 2017-02-21 RX ORDER — WARFARIN 2.5 MG/1
2.5 TABLET ORAL DAILY
Qty: 30 TAB | Refills: 5 | Status: SHIPPED | OUTPATIENT
Start: 2017-02-21 | End: 2017-10-17

## 2017-02-21 RX ORDER — WARFARIN 1 MG/1
2 TABLET ORAL EVERY EVENING
Qty: 60 TAB | Refills: 5 | Status: CANCELLED | OUTPATIENT
Start: 2017-02-21

## 2017-02-21 NOTE — PROGRESS NOTES
Geremias Roberts  Ms. Theo Gibbs is a 33y.o. year old female . HPI   Cardiovascular Review:  The patient has malignant hypertension. Diet and Lifestyle: generally follows a low fat low cholesterol diet, generally follows a low sodium diet, sedentary, nonsmoker  Home BP Monitoring: is not measured at home. Pertinent ROS: not taking medications regularly as instructed, no TIA's, no chest pain on exertion, no dyspnea on exertion, no swelling of ankles. Study date: 11-Jul-2016  Status: Routine    Technologist: Goran Patterson  Reading Physician: Haylie Schmidt MD    SUMMARY:  Left ventricle: Systolic function was normal. Ejection fraction was  estimated in the range of 65 % to 70 %. There were no regional wall motion  abnormalities. There was moderate concentric hypertrophy. Right ventricle: The size was normal. Systolic function was normal.    Left atrium: The atrium was dilated. Mitral valve: There was mild regurgitation. Anticoagulation  Patient here for followup of chronic anticoagulation. Reports compliance with medication daily although INR remains subtherapeutic. Indication: PE. Bleeding Signs/Symptoms:  None. Thromboembolic Signs/Symptoms:  None    Chronic Kidney Disease  Treatments have been prescribed for slowing the progression of CRF include blood pressure control and avoid nonsteroidal anti-inflammatory drugs (NSAIDs). Blood pressures since the last visit have averaged: normal.  The patient has experienced no problems. The patient has not experienced any PND, orthopnea, edema, chest pain. Patient performs peritoneal dialysis 7 days a week. Seen by nephrology, Dr. Masood Upton.        Lab Results   Component Value Date/Time    INR 1.2 10/04/2016 03:45 PM    INR 1.6 08/18/2016 11:52 AM    INR 1.2 07/19/2016 12:54 PM    INR (POC) 1.4 09/16/2015 12:00 PM    INR (POC) 4.9 08/29/2013 03:24 PM    Prothrombin time 12.6 10/04/2016 03:45 PM    Prothrombin time 16.4 08/18/2016 11:52 AM    Prothrombin time 12.6 07/19/2016 12:54 PM        Current Outpatient Prescriptions   Medication Sig Dispense Refill    warfarin (COUMADIN) 2.5 mg tablet Take 1 Tab by mouth daily. 30 Tab 5    oxyCODONE IR (ROXICODONE) 5 mg immediate release tablet Take 1 tab in AM and HALF to 1 tab in PM if needed on days where back pain is severe    40 Tab 0    amLODIPine (NORVASC) 10 mg tablet Take 1 Tab by mouth daily. 30 Tab 5    losartan (COZAAR) 50 mg tablet Take 1 Tab by mouth daily. For blood pressure 30 Tab 5    cloNIDine HCl (CATAPRES) 0.3 mg tablet Take 1 Tab by mouth two (2) times a day. For blood pressure 60 Tab 5    metoprolol succinate (TOPROL-XL) 100 mg tablet Take 1 Tab by mouth daily. For blood pressure 30 Tab 5    furosemide (LASIX) 40 mg tablet Take 1 Tab by mouth daily. For fluid 30 Tab 5    cinacalcet (SENSIPAR) 30 mg tablet Take 30 mg by mouth daily.  AURYXIA 210 mg iron tablet TK 2 TS PO WITH MEALS  12    predniSONE (DELTASONE) 10 mg tablet Take 9 mg by mouth daily (with breakfast).  ergocalciferol (VITAMIN D2) 50,000 unit capsule Take 50,000 Units by mouth every seven (7) days.  hydroxychloroquine (PLAQUENIL) 200 mg tablet Take 200 mg by mouth two (2) times a day.  ondansetron hcl (ZOFRAN) 8 mg tablet Take 1 Tab by mouth every eight (8) hours as needed. For Nausea  Indications: GASTROPARESIS 15 Tab 1    allopurinol (ZYLOPRIM) 100 mg tablet Take 100 mg by mouth daily.  cyanocobalamin (VITAMIN B-12) 2,500 mcg sublingual tablet Take 1 Tab by mouth daily. 90 Tab 1    pantoprazole (PROTONIX) 40 mg tablet Take 1 Tab by mouth Daily (before breakfast). 30 Tab 0    calcitRIOL (ROCALTROL) 0.25 mcg capsule Take 0.25 mcg by mouth daily. 5    senna (SENNA) 8.6 mg tablet Take 1 Tab by mouth daily as needed.        Past Medical History   Diagnosis Date    Anemia      secondary to lupus    Asthma      no inhaler use in past 2 to 3 years    Carditis     Chronic pain     DDD (degenerative disc disease), lumbar     [facility] [address]     Heart failure ([facility] [address]     Hypercholesterolemia     Hypertension     Intractable nausea and vomiting 10/21/2015    Long term (current) use of anticoagulants     Lupus (systemic lupus erythematosus) (HCC)     Malignant hypertension with chronic kidney disease stage V ([facility] [address]     Peritoneal dialysis status ([facility] [address] 10/2015    Thromboembolus ([facility] [address] 2013     lungs     Past Surgical History   Procedure Laterality Date    Hx  section  2006    Hx other surgical  9/16/15     INSERTION PD CATH       Review of Systems   Constitutional: Negative for weight loss. Cardiovascular: Negative for leg swelling. Gastrointestinal: Negative for heartburn. Musculoskeletal: Negative for back pain and joint pain. Neurological: Negative for weakness. Psychiatric/Behavioral: Negative for depression. Physical Exam   Constitutional: She is oriented to person, place, and time. She appears well-developed and well-nourished. Neck: Normal range of motion. Neck supple. No JVD present. Carotid bruit is not present. No thyromegaly present. Cardiovascular: Normal rate, regular rhythm and intact distal pulses. Exam reveals no gallop and no friction rub. No murmur heard. Pulmonary/Chest: Effort normal and breath sounds normal. No respiratory distress. Musculoskeletal: She exhibits no edema. Lymphadenopathy:     She has no cervical adenopathy. Neurological: She is alert and oriented to person, place, and time. Psychiatric: She has a normal mood and affect. Her behavior is normal.   Nursing note and vitals reviewed. ASSESSMENT and PLAN  Tex was seen today for hypertension. Diagnoses and all orders for this visit:    Malignant hypertension  Improved control based on home readings. Patient reports not taking her medication today.  Continue home monitoring and call for reading >140/90    ESRD (end stage renal disease) ([facility] [address]  Managed by nephrology, no changes     Anemia of renal disease  Managed by nephrology, no changes     Lupus nephritis ([facility] [address]  Managed by nephrology, no changes     [facility] ([facility]  Discussed importance of medication compliance. Will set home PT/INR machine to improved testing compliance. Patient remains sub therapeutic. Increase Warafrin to 2.5 mg daily. Recheck INR in 1 week. -     warfarin (COUMADIN) 2.5 mg tablet; Take 1 Tab by mouth daily. Dependence on peritoneal dialysis [facility])  Managed by nephrology, no changes     Medicare annual wellness visit, initial    ACP (advance care planning)  Discussed importance of living will; paperwork provided on [employer]      I have discussed the diagnosis with the patient and the intended plan as seen in the above orders. The patient has received an after-visit summary along with patient information handout. I have discussed medication side effects and warnings with the patient as well. Follow-up Disposition:  Return in about 4 months (around 6/21/2017) for blood pressure.

## 2017-02-21 NOTE — MR AVS SNAPSHOT
Visit Information Date & Time Provider Department Dept. Phone Encounter #  
 2/21/2017 11:30 AM Jovany Belle  [facility] [phone] [id number] Follow-up Instructions Return in about 4 months (around 6/21/2017) for blood pressure. Your Appointments 3/1/2017  9:20 AM  
Follow Up with Amy Barajas MD  
Neurology [address] ([facility]) Appt Note: follow up pain management cl [address] [place] [facility] 99 [address] [phone]  
  
   
 [address] [place] [address] [id number] [address] Upcoming Health Maintenance Date Due DTaP/Tdap/Td series (1 - Tdap) 2/17/2007 Pneumococcal 19-64 Highest Risk (2 of 3 - PCV13) 10/1/2010 PAP AKA CERVICAL CYTOLOGY 4/13/2019 Allergies as of 2/21/2017  Review Complete On: 1/4/2017 By: Waldemar Sebastian LPN Severity Noted Reaction Type Reactions Compazine [Prochlorperazine Edisylate] High 07/11/2016   Systemic Nausea and Vomiting, Palpitations Fish Containing Products High 02/21/2017   Side Effect Rash An itchy rash appeared in about 1 hour Current Immunizations  Reviewed on 9/19/2014 Name Date Influenza Vaccine 9/9/2012 Influenza Vaccine Split 9/1/2011 Pneumococcal Vaccine (Unspecified Type) 10/1/2009 Not reviewed this visit You Were Diagnosed With   
  
 Codes Comments Malignant hypertension    -  Primary ICD-10-CM: I10 
ICD-9-CM: 401.0 ESRD (end stage renal disease) ([facility]     ICD-10-CM: N18.6 ICD-9-CM: 757. 6 Anemia of renal disease     ICD-10-CM: D63.1 ICD-9-CM: 285.21 Lupus nephritis ([facility]     ICD-10-CM: M32.14 ICD-9-CM: 710.0, 583.81 Hypercoagulable state ([facility]     ICD-10-CM: B14.95 
ICD-9-CM: 289.81 Dependence on peritoneal dialysis [facility])     ICD-10-CM: Z99.2 ICD-9-CM: V45.11 Vitals  BP Pulse Resp Height(growth percentile) Weight(growth percentile) LMP  
 (!) 151/121 (BP 1 Location: Left arm, BP Patient Position: Sitting) 65 12 5' 5\" (1.651 m) 186 lb 12.8 oz (84.7 kg) 10/18/2016 (Approximate) SpO2 BMI OB Status Smoking Status 96% 31.09 kg/m2 Having regular periods Never Smoker Vitals History BMI and BSA Data Body Mass Index Body Surface Area 31.09 kg/m 2 1.97 m 2 Preferred Pharmacy Pharmacy Name Phone [address], [address] Your Updated Medication List  
  
   
This list is accurate as of: 2/21/17  1:07 PM.  Always use your most recent med list.  
  
  
  
  
 allopurinol 100 mg tablet Commonly known as:  Arlington Gault Take 100 mg by mouth daily. amLODIPine 10 mg tablet Commonly known as:  Rudene Post Take 1 Tab by mouth daily. AURYXIA 210 mg iron tablet Generic drug:  ferric citrate TK 2 TS PO WITH MEALS  
  
 calcitRIOL 0.25 mcg capsule Commonly known as:  ROCALTROL Take 0.25 mcg by mouth daily. cloNIDine HCl 0.3 mg tablet Commonly known as:  CATAPRES Take 1 Tab by mouth two (2) times a day. For blood pressure  
  
 cyanocobalamin 2,500 mcg sublingual tablet Commonly known as:  VITAMIN B-12 Take 1 Tab by mouth daily. furosemide 40 mg tablet Commonly known as:  LASIX Take 1 Tab by mouth daily. For fluid  
  
 losartan 50 mg tablet Commonly known as:  COZAAR Take 1 Tab by mouth daily. For blood pressure  
  
 metoprolol succinate 100 mg tablet Commonly known as:  TOPROL-XL Take 1 Tab by mouth daily. For blood pressure  
  
 ondansetron hcl 8 mg tablet Commonly known as:  Dahiana Cordoba Take 1 Tab by mouth every eight (8) hours as needed. For Nausea  Indications: GASTROPARESIS  
  
 oxyCODONE IR 5 mg immediate release tablet Commonly known as:  Dafne Speed Take 1 tab in AM and HALF to 1 tab in PM if needed on days where back pain is severe  
  
 pantoprazole 40 mg tablet Commonly known as:  PROTONIX Take 1 Tab by mouth Daily (before breakfast). PLAQUENIL 200 mg tablet Generic drug:  hydroxychloroquine Take 200 mg by mouth two (2) times a day. predniSONE 10 mg tablet Commonly known as:  Aelna Mons Take 9 mg by mouth daily (with breakfast). Senna 8.6 mg tablet Generic drug:  senna Take 1 Tab by mouth daily as needed. SENSIPAR 30 mg tablet Generic drug:  cinacalcet Take 30 mg by mouth daily. VITAMIN D2 50,000 unit capsule Generic drug:  ergocalciferol Take 50,000 Units by mouth every seven (7) days. warfarin 2.5 mg tablet Commonly known as:  COUMADIN Take 1 Tab by mouth daily. Prescriptions Sent to Pharmacy Refills  
 warfarin (COUMADIN) 2.5 mg tablet 5 Sig: Take 1 Tab by mouth daily. Class: Normal  
 Pharmacy: [address] #: [phone] Route: Oral  
  
Follow-up Instructions Return in about 4 months (around 6/21/2017) for blood pressure. Patient Instructions Anemia From Chronic Disease: Care Instructions Your Care Instructions Anemia is a low level of red blood cells, which carry oxygen from your lungs to the rest of your body. Sometimes when you have a long-term (chronic) disease such as kidney disease, arthritis, diabetes, cancer, or an infection, your body does not make enough red blood cells. Follow-up care is a key part of your treatment and safety. Be sure to make and go to all appointments, and call your doctor if you are having problems. It's also a good idea to know your test results and keep a list of the medicines you take. How can you care for yourself at home? · Follow your doctor's instructions to treat the chronic condition that is causing the anemia.  
· Take your medicine to treat your chronic condition exactly as prescribed. Call your doctor if you think you are having a problem with your medicine. · Take your medicine for anemia exactly as prescribed. Call your doctor if you think you are having a problem with your medicine. Medicines to increase the number of red blood cells (such as epoetin or darbepoetin) may be given as an injection. ¨ If you miss a dose, take it as soon as you can, unless it is almost time for your next dose. In that case, get back on your regular schedule and take only one dose. ¨ Do not freeze this medicine. Store it in the refrigerator. Do not shake the bottle before you prepare the shot. · Keep all your appointments for blood tests to check on your hemoglobin levels. When should you call for help? Call 911 anytime you think you may need emergency care. For example, call if: 
· You passed out (lost consciousness). · You have symptoms of a heart attack, such as: ¨ Chest pain or pressure. ¨ Sweating. ¨ Shortness of breath. ¨ Nausea or vomiting. ¨ Pain that spreads from the chest to the neck, jaw, or one or both shoulders or arms. ¨ Dizziness or lightheadedness. ¨ A fast or uneven pulse. After calling 911, chew 1 adult-strength aspirin. Wait for an ambulance. Do not try to drive yourself. · You have a seizure. Call your doctor now or seek immediate medical care if: 
· You have side effects of epoetin and darbepoetin, such as: ¨ A headache. ¨ A fever. ¨ Diarrhea, nausea, or vomiting. ¨ Fatigue. ¨ Muscle or joint pain. ¨ A skin rash. · Your fatigue and weakness continue or get worse. Watch closely for changes in your health, and be sure to contact your doctor if you have any problems. Where can you learn more? Go to http://jorge-rochelle.info/. Enter E502 in the search box to learn more about \"Anemia From Chronic Disease: Care Instructions. \" Current as of: February 5, 2016 Content Version: 11.1 © 5328-0655 Healthwise, Incorporated. Care instructions adapted under license by [employer] (which disclaims liability or warranty for this information). If you have questions about a medical condition or this instruction, always ask your healthcare professional. [address] any warranty or liability for your use of this information. Introducing [facility]! Jacquie Allen introduces Vicampo patient portal. Now you can access parts of your medical record, email your doctor's office, and request medication refills online. 1. In your internet browser, go to [email or web address] 2. Click on the First Time User? Click Here link in the Sign In box. You will see the New Member Sign Up page. 3. Enter your Vicampo Access Code exactly as it appears below. You will not need to use this code after youve completed the sign-up process. If you do not sign up before the expiration date, you must request a new code. · [employer]ampo Access Code: [email or web address] Expires: 4/4/2017 11:37 AM 
 
4. Enter the last four digits of your Social Security Number (xxxx) and Date of Birth (mm/dd/yyyy) as indicated and click Submit. You will be taken to the next sign-up page. 5. Create a Vicampo ID. This will be your Vicampo login ID and cannot be changed, so think of one that is secure and easy to remember. 6. Create a Vicampo password. You can change your password at any time. 7. Enter your Password Reset Question and Answer. This can be used at a later time if you forget your password. 8. Enter your e-mail address. You will receive e-mail notification when new information is available in [address] 9. Click Sign Up. You can now view and download portions of your medical record. 10. Click the Download Summary menu link to download a portable copy of your medical information.  
 
If you have questions, please visit the Frequently Asked Questions section of the [employer]. Remember, appMobihart is NOT to be used for urgent needs. For medical emergencies, dial 911. Now available from your iPhone and Android! Please provide this summary of care documentation to your next provider. Your primary care clinician is listed as Jennifer Meraz. If you have any questions after today's visit, please call [phone].

## 2017-02-21 NOTE — PROGRESS NOTES
1. Have you been to the ER, urgent care clinic since your last visit? Hospitalized since your last visit? No    2. Have you seen or consulted any other health care providers outside of the [address] [place] [address] since your last visit? Include any pap smears or colon screening.  No

## 2017-02-21 NOTE — PATIENT INSTRUCTIONS
Anemia From Chronic Disease: Care Instructions  Your Care Instructions    Anemia is a low level of red blood cells, which carry oxygen from your lungs to the rest of your body. Sometimes when you have a long-term (chronic) disease such as kidney disease, arthritis, diabetes, cancer, or an infection, your body does not make enough red blood cells. Follow-up care is a key part of your treatment and safety. Be sure to make and go to all appointments, and call your doctor if you are having problems. It's also a good idea to know your test results and keep a list of the medicines you take. How can you care for yourself at home? · Follow your doctor's instructions to treat the chronic condition that is causing the anemia. · Take your medicine to treat your chronic condition exactly as prescribed. Call your doctor if you think you are having a problem with your medicine. · Take your medicine for anemia exactly as prescribed. Call your doctor if you think you are having a problem with your medicine. Medicines to increase the number of red blood cells (such as epoetin or darbepoetin) may be given as an injection. ¨ If you miss a dose, take it as soon as you can, unless it is almost time for your next dose. In that case, get back on your regular schedule and take only one dose. ¨ Do not freeze this medicine. Store it in the refrigerator. Do not shake the bottle before you prepare the shot. · Keep all your appointments for blood tests to check on your hemoglobin levels. When should you call for help? Call 911 anytime you think you may need emergency care. For example, call if:  · You passed out (lost consciousness). · You have symptoms of a heart attack, such as:  ¨ Chest pain or pressure. ¨ Sweating. ¨ Shortness of breath. ¨ Nausea or vomiting. ¨ Pain that spreads from the chest to the neck, jaw, or one or both shoulders or arms. ¨ Dizziness or lightheadedness. ¨ A fast or uneven pulse.   After calling 911, chew 1 adult-strength aspirin. Wait for an ambulance. Do not try to drive yourself. · You have a seizure. Call your doctor now or seek immediate medical care if:  · You have side effects of epoetin and darbepoetin, such as:  ¨ A headache. ¨ A fever. ¨ Diarrhea, nausea, or vomiting. ¨ Fatigue. ¨ Muscle or joint pain. ¨ A skin rash. · Your fatigue and weakness continue or get worse. Watch closely for changes in your health, and be sure to contact your doctor if you have any problems. Where can you learn more? Go to http://jorge-rochelle.info/. Enter E502 in the search box to learn more about \"Anemia From Chronic Disease: Care Instructions. \"  Current as of: February 5, 2016  Content Version: 11.1  © 5175-2223 [employer], Incorporated. Care instructions adapted under license by opinions.h (which disclaims liability or warranty for this information). If you have questions about a medical condition or this instruction, always ask your healthcare professional. [place] [address] any warranty or liability for your use of this information.

## 2017-02-22 NOTE — PROGRESS NOTES
Shannon Sosa is a 33 y.o. female and presents for annual Medicare Wellness Visit. Problem List: Reviewed with patient and discussed risk factors. Patient Active Problem List   Diagnosis Code    Lupus ([facility] M32.9    Lupus nephritis ([facility] M32.14    Pulmonary embolism ([facility] I26.99    Encounter for monitoring opioid maintenance therapy Z51.81, Z79.891    Malignant hypertension I10    ESRD (end stage renal disease) ([facility] N18.6    Anemia of renal disease D63.1    Dependence on peritoneal dialysis ([facility] Z99.2       Current medical providers:  Patient Care Team:  Nader Sharma NP as PCP - General (Nurse Practitioner)  Tracie Parekh MD (Neurology)  Lucas Cotto MD as Physician (Rheumatology)  [facility], [place]  Adrián Sheikh MD as Surgeon (General Surgery)  Enmanuel Merida RN as Nurse Randi Wagner MD (Nephrology)    PSH: Reviewed with patient  Past Surgical History   Procedure Laterality Date    Hx  section  2006    Hx other surgical  9/16/15     INSERTION PD CATH        SH: Reviewed with patient  Social History   Substance Use Topics    Smoking status: Never Smoker    Smokeless tobacco: Never Used    Alcohol use No       FH: Reviewed with patient  Family History   Problem Relation Age of Onset    Diabetes Father     Hypertension Father     Cancer Other      aunt with breast cancer    Diabetes Mother        Medications/Allergies: Reviewed with patient  Current Outpatient Prescriptions on File Prior to Visit   Medication Sig Dispense Refill    oxyCODONE IR (ROXICODONE) 5 mg immediate release tablet Take 1 tab in AM and HALF to 1 tab in PM if needed on days where back pain is severe    40 Tab 0    amLODIPine (NORVASC) 10 mg tablet Take 1 Tab by mouth daily. 30 Tab 5    losartan (COZAAR) 50 mg tablet Take 1 Tab by mouth daily.  For blood pressure 30 Tab 5    cloNIDine HCl (CATAPRES) 0.3 mg tablet Take 1 Tab by mouth two (2) times a day. For blood pressure 60 Tab 5    metoprolol succinate (TOPROL-XL) 100 mg tablet Take 1 Tab by mouth daily. For blood pressure 30 Tab 5    furosemide (LASIX) 40 mg tablet Take 1 Tab by mouth daily. For fluid 30 Tab 5    cinacalcet (SENSIPAR) 30 mg tablet Take 30 mg by mouth daily.  AURYXIA 210 mg iron tablet TK 2 TS PO WITH MEALS  12    predniSONE (DELTASONE) 10 mg tablet Take 9 mg by mouth daily (with breakfast).  ergocalciferol (VITAMIN D2) 50,000 unit capsule Take 50,000 Units by mouth every seven (7) days.  hydroxychloroquine (PLAQUENIL) 200 mg tablet Take 200 mg by mouth two (2) times a day.  ondansetron hcl (ZOFRAN) 8 mg tablet Take 1 Tab by mouth every eight (8) hours as needed. For Nausea  Indications: GASTROPARESIS 15 Tab 1    allopurinol (ZYLOPRIM) 100 mg tablet Take 100 mg by mouth daily.  cyanocobalamin (VITAMIN B-12) 2,500 mcg sublingual tablet Take 1 Tab by mouth daily. 90 Tab 1    pantoprazole (PROTONIX) 40 mg tablet Take 1 Tab by mouth Daily (before breakfast). 30 Tab 0    calcitRIOL (ROCALTROL) 0.25 mcg capsule Take 0.25 mcg by mouth daily. 5    senna (SENNA) 8.6 mg tablet Take 1 Tab by mouth daily as needed. No current facility-administered medications on file prior to visit. Allergies   Allergen Reactions    Compazine [Prochlorperazine Edisylate] Nausea and Vomiting and Palpitations    Fish Containing Products Rash     An itchy rash appeared in about 1 hour       Objective:  Visit Vitals    BP (!) 151/121 (BP 1 Location: Left arm, BP Patient Position: Sitting)    Pulse 65    Resp 12    Ht 5' 5\" (1.651 m)    Wt 186 lb 12.8 oz (84.7 kg)    LMP 10/18/2016 (Approximate)    SpO2 96%    BMI 31.09 kg/m2    Body mass index is 31.09 kg/(m^2).     Assessment of cognitive impairment: Alert and oriented x 3    Depression Screen:   PHQ 2 / 9, over the last two weeks 2/21/2017   Little interest or pleasure in doing things Not at all Feeling down, depressed or hopeless Not at all   Total Score PHQ 2 0       Fall Risk Assessment:  No flowsheet data found. Functional Ability:   Does the patient exhibit a steady gait? yes   How long did it take the patient to get up and walk from a sitting position? 3 sec   Is the patient self reliant?  (ie can do own laundry, meals, household chores)  yes     Does the patient handle his/her own medications? yes     Does the patient handle his/her own money? yes     Is the patients home safe (ie good lighting, handrails on stairs and bath, etc.)? yes     Did you notice or did patient express any hearing difficulties? no     Did you notice or did patient express any vision difficulties?   no     Were distance and reading eye charts used? no       Advance Care Planning:   Patient was offered the opportunity to discuss advance care planning:  yes     Does patient have an Advance Directive:  no   If no, did you provide information on Caring Connections? yes       Plan:      Orders Placed This Encounter    warfarin (COUMADIN) 2.5 mg tablet       Health Maintenance   Topic Date Due    DTaP/Tdap/Td series (1 - Tdap) 02/17/2007    Pneumococcal 19-64 Highest Risk (2 of 3 - PCV13) 10/01/2010    PAP AKA CERVICAL CYTOLOGY  04/13/2019    INFLUENZA AGE 9 TO ADULT  Addressed       *Patient verbalized understanding and agreement with the plan. A copy of the After Visit Summary with personalized health plan was given to the patient today.

## 2017-03-01 ENCOUNTER — OFFICE VISIT (OUTPATIENT)
Dept: NEUROLOGY | Age: 32
End: 2017-03-01

## 2017-03-01 VITALS
WEIGHT: 186 LBS | BODY MASS INDEX: 30.99 KG/M2 | SYSTOLIC BLOOD PRESSURE: 130 MMHG | DIASTOLIC BLOOD PRESSURE: 90 MMHG | HEART RATE: 81 BPM | OXYGEN SATURATION: 98 % | HEIGHT: 65 IN

## 2017-03-01 DIAGNOSIS — G89.29 CHRONIC BILATERAL LOW BACK PAIN WITHOUT SCIATICA: Primary | ICD-10-CM

## 2017-03-01 DIAGNOSIS — M54.50 CHRONIC BILATERAL LOW BACK PAIN WITHOUT SCIATICA: Primary | ICD-10-CM

## 2017-03-01 RX ORDER — OXYCODONE HYDROCHLORIDE 5 MG/1
TABLET ORAL
Qty: 40 TAB | Refills: 0 | Status: SHIPPED | OUTPATIENT
Start: 2017-03-01 | End: 2017-03-31 | Stop reason: SDUPTHER

## 2017-03-01 NOTE — PROGRESS NOTES
Pill count = Patient didn't bring  UDS obtained and sent =  Pain contract =on file   made available for Dr Elda Causey review

## 2017-03-01 NOTE — PROGRESS NOTES
Interval HPI:     This is a 33 y.o. female who is following up for     PMHx: chronic back pain, hx of fibromyalgia, hx of DVT/ coumadin, hx of Lupus, mild lower lumbar disc degeneration (L4-5 and L5-S1 with small annular tear at L5-S1)    Chief Complaint   Patient presents with    Back Pain     Continues to have pain across lower back, not going into legs  Rates it as a 4/ 10 on most days with her pain medication  Taking OxyCodone IR 5 mg tabs, 1 tab per day, some days 1.5 tabs  Reports having 3-4 tabs left over   reviewed: only filling pain Rx from my office. Last UDS in Dec 2016 was consistent with Rx meds    Tried/ failed: Gabapentin, Cymbalta, Amitriptyline (only helped get to sleep), Lyrica (abnormal behaviors)    Brief ROS: as above  There have been no significant changes in PMHx, PSHx, SHx except as noted above. Allergies   Allergen Reactions    Compazine [Prochlorperazine Edisylate] Nausea and Vomiting and Palpitations    Fish Containing Products Rash     An itchy rash appeared in about 1 hour     Current Outpatient Prescriptions   Medication Sig Dispense Refill    oxyCODONE IR (ROXICODONE) 5 mg immediate release tablet Take 1 tab in AM and HALF to 1 tab in PM if needed on days where back pain is severe    40 Tab 0    warfarin (COUMADIN) 2.5 mg tablet Take 1 Tab by mouth daily. 30 Tab 5    amLODIPine (NORVASC) 10 mg tablet Take 1 Tab by mouth daily. 30 Tab 5    losartan (COZAAR) 50 mg tablet Take 1 Tab by mouth daily. For blood pressure 30 Tab 5    cloNIDine HCl (CATAPRES) 0.3 mg tablet Take 1 Tab by mouth two (2) times a day. For blood pressure 60 Tab 5    metoprolol succinate (TOPROL-XL) 100 mg tablet Take 1 Tab by mouth daily. For blood pressure 30 Tab 5    furosemide (LASIX) 40 mg tablet Take 1 Tab by mouth daily. For fluid 30 Tab 5    cinacalcet (SENSIPAR) 30 mg tablet Take 30 mg by mouth daily.       AURYXIA 210 mg iron tablet TK 2 TS PO WITH MEALS  12    predniSONE (DELTASONE) 10 mg tablet Take 9 mg by mouth daily (with breakfast).  ergocalciferol (VITAMIN D2) 50,000 unit capsule Take 50,000 Units by mouth every seven (7) days.  hydroxychloroquine (PLAQUENIL) 200 mg tablet Take 200 mg by mouth two (2) times a day.  ondansetron hcl (ZOFRAN) 8 mg tablet Take 1 Tab by mouth every eight (8) hours as needed. For Nausea  Indications: GASTROPARESIS 15 Tab 1    allopurinol (ZYLOPRIM) 100 mg tablet Take 100 mg by mouth daily.  cyanocobalamin (VITAMIN B-12) 2,500 mcg sublingual tablet Take 1 Tab by mouth daily. 90 Tab 1    pantoprazole (PROTONIX) 40 mg tablet Take 1 Tab by mouth Daily (before breakfast). 30 Tab 0    calcitRIOL (ROCALTROL) 0.25 mcg capsule Take 0.25 mcg by mouth daily. 5    senna (SENNA) 8.6 mg tablet Take 1 Tab by mouth daily as needed. Physical Exam  Blood pressure 130/90, pulse 81, height 5' 5\" (1.651 m), weight 84.4 kg (186 lb), last menstrual period 10/18/2016, SpO2 98 %. No acute distress    MSK:  Lumbar spine:   + tenderness across lower back   Mild pain with back flexion and extension  + increased pain with facet loading to left, not to right      Focused Neurological Exam     Mental status: Alert and oriented to person, place situation  CNs: EOMI, Face symmetric, Hearing/ Language normal  Sensory: intact light touch in both legs  Motor: Normal bulk and strength in all 4 extremities. Reflexes: not tested today  Gait: antalgic    Impression    ICD-10-CM ICD-9-CM    1. Chronic bilateral low back pain without sciatica M54.5 724.2 oxyCODONE IR (ROXICODONE) 5 mg immediate release tablet    G89.29 338.29         Renewed Rx Oxycodone IR 5 mg tablet (#40 tabs) to take 1 tab in AM and occasionally HALF to one tab in evenings if pain is severe. Check UDS at next visit. Asked patient to start bringing in bottles for pill count at visits.   F/u in 4 weeks

## 2017-03-01 NOTE — MR AVS SNAPSHOT
Visit Information Date & Time Provider Department Dept. Phone Encounter #  
 3/1/2017  9:20 AM Sung Jett MD Neurology Clinic [facility] [phone] [id number] Follow-up Instructions Return in about 4 weeks (around 3/29/2017). Upcoming Health Maintenance Date Due DTaP/Tdap/Td series (1 - Tdap) 2/17/2007 Pneumococcal 19-64 Highest Risk (2 of 3 - PCV13) 10/1/2010 PAP AKA CERVICAL CYTOLOGY 4/13/2019 Allergies as of 3/1/2017  Review Complete On: 3/1/2017 By: Kelsea Staton LPN Severity Noted Reaction Type Reactions Compazine [Prochlorperazine Edisylate] High 07/11/2016   Systemic Nausea and Vomiting, Palpitations Fish Containing Products High 02/21/2017   Side Effect Rash An itchy rash appeared in about 1 hour Current Immunizations  Reviewed on 9/19/2014 Name Date Influenza Vaccine 9/9/2012 Influenza Vaccine Split 9/1/2011 Pneumococcal Vaccine (Unspecified Type) 10/1/2009 Not reviewed this visit You Were Diagnosed With   
  
 Codes Comments Chronic bilateral low back pain without sciatica    -  Primary ICD-10-CM: M54.5, G89.29 ICD-9-CM: 724.2, 338.29 Vitals BP  
  
  
  
  
  
 130/90 (BP 1 Location: Left arm, BP Patient Position: Sitting) Vitals History BMI and BSA Data Body Mass Index Body Surface Area 30.95 kg/m 2 1.97 m 2 Preferred Pharmacy Pharmacy Name Phone [address], [address] Your Updated Medication List  
  
   
This list is accurate as of: 3/1/17  9:58 AM.  Always use your most recent med list.  
  
  
  
  
 allopurinol 100 mg tablet Commonly known as:  Elisa Ricksley Take 100 mg by mouth daily. amLODIPine 10 mg tablet Commonly known as:  Roma [place] Anson Take 1 Tab by mouth daily. AURYXIA 210 mg iron tablet Generic drug:  ferric citrate TK 2 TS PO WITH MEALS  
  
 calcitRIOL 0.25 mcg capsule Commonly known as:  ROCALTROL Take 0.25 mcg by mouth daily. cloNIDine HCl 0.3 mg tablet Commonly known as:  CATAPRES Take 1 Tab by mouth two (2) times a day. For blood pressure  
  
 cyanocobalamin 2,500 mcg sublingual tablet Commonly known as:  VITAMIN B-12 Take 1 Tab by mouth daily. furosemide 40 mg tablet Commonly known as:  LASIX Take 1 Tab by mouth daily. For fluid  
  
 losartan 50 mg tablet Commonly known as:  COZAAR Take 1 Tab by mouth daily. For blood pressure  
  
 metoprolol succinate 100 mg tablet Commonly known as:  TOPROL-XL Take 1 Tab by mouth daily. For blood pressure  
  
 ondansetron hcl 8 mg tablet Commonly known as:  Westjosh Mendez Take 1 Tab by mouth every eight (8) hours as needed. For Nausea  Indications: GASTROPARESIS  
  
 oxyCODONE IR 5 mg immediate release tablet Commonly known as:  Franceen Ly Take 1 tab in AM and HALF to 1 tab in PM if needed on days where back pain is severe  
  
 pantoprazole 40 mg tablet Commonly known as:  PROTONIX Take 1 Tab by mouth Daily (before breakfast). PLAQUENIL 200 mg tablet Generic drug:  hydroxychloroquine Take 200 mg by mouth two (2) times a day. predniSONE 10 mg tablet Commonly known as:  Delgado Lever Take 9 mg by mouth daily (with breakfast). Senna 8.6 mg tablet Generic drug:  senna Take 1 Tab by mouth daily as needed. SENSIPAR 30 mg tablet Generic drug:  cinacalcet Take 30 mg by mouth daily. VITAMIN D2 50,000 unit capsule Generic drug:  ergocalciferol Take 50,000 Units by mouth every seven (7) days. warfarin 2.5 mg tablet Commonly known as:  COUMADIN Take 1 Tab by mouth daily. Prescriptions Printed Refills  
 oxyCODONE IR (ROXICODONE) 5 mg immediate release tablet 0 Sig: Take 1 tab in AM and HALF to 1 tab in PM if needed on days where back pain is severe 
   
 Class: Print Follow-up Instructions Return in about 4 weeks (around 3/29/2017). Introducing [facility] & HEALTH SERVICES! [employer] introduces [employer] patient portal. Now you can access parts of your medical record, email your doctor's office, and request medication refills online. 1. In your internet browser, go to [email or web address] 2. Click on the First Time User? Click Here link in the Sign In box. You will see the New Member Sign Up page. 3. Enter your [employer] Access Code exactly as it appears below. You will not need to use this code after youve completed the sign-up process. If you do not sign up before the expiration date, you must request a new code. · [employer] Access Code: [email or web address] Expires: 4/4/2017 11:37 AM 
 
4. Enter the last four digits of your Social Security Number (xxxx) and Date of Birth (mm/dd/yyyy) as indicated and click Submit. You will be taken to the next sign-up page. 5. Create a [employer] ID. This will be your [employer] login ID and cannot be changed, so think of one that is secure and easy to remember. 6. Create a [employer] password. You can change your password at any time. 7. Enter your Password Reset Question and Answer. This can be used at a later time if you forget your password. 8. Enter your e-mail address. You will receive e-mail notification when new information is available in [address] 9. Click Sign Up. You can now view and download portions of your medical record. 10. Click the Download Summary menu link to download a portable copy of your medical information. If you have questions, please visit the Frequently Asked Questions section of the [employer] website. Remember, [employer] is NOT to be used for urgent needs. For medical emergencies, dial 911. Now available from your iPhone and Android! Please provide this summary of care documentation to your next provider. Your primary care clinician is listed as Lora Burkitt.  If you have any questions after today's visit, please call [phone].

## 2017-03-29 ENCOUNTER — TELEPHONE (OUTPATIENT)
Dept: NEUROLOGY | Age: 32
End: 2017-03-29

## 2017-03-29 NOTE — TELEPHONE ENCOUNTER
----- Message from Satish Jennings sent at 3/29/2017  8:14 AM EDT -----  Regarding: Dr. Gerald Jones  Pt called regarding her medication which she had to reschedule her appt today because she has to do her dialysis but she wanted to see if she could get an earlier appt if possible because she needs meds.  Pt best contact number ([phone]        There are not earlier appts but she may need temporary refill

## 2017-03-31 ENCOUNTER — OFFICE VISIT (OUTPATIENT)
Dept: NEUROLOGY | Age: 32
End: 2017-03-31

## 2017-03-31 VITALS
WEIGHT: 186 LBS | HEIGHT: 65 IN | SYSTOLIC BLOOD PRESSURE: 158 MMHG | DIASTOLIC BLOOD PRESSURE: 98 MMHG | HEART RATE: 102 BPM | BODY MASS INDEX: 30.99 KG/M2 | OXYGEN SATURATION: 95 %

## 2017-03-31 DIAGNOSIS — R52 PAIN MANAGEMENT: ICD-10-CM

## 2017-03-31 DIAGNOSIS — M54.50 CHRONIC BILATERAL LOW BACK PAIN WITHOUT SCIATICA: Primary | ICD-10-CM

## 2017-03-31 DIAGNOSIS — M79.7 FIBROMYALGIA: ICD-10-CM

## 2017-03-31 DIAGNOSIS — G89.29 CHRONIC BILATERAL LOW BACK PAIN WITHOUT SCIATICA: Primary | ICD-10-CM

## 2017-03-31 RX ORDER — OXYCODONE HYDROCHLORIDE 5 MG/1
TABLET ORAL
Qty: 40 TAB | Refills: 0 | Status: SHIPPED | OUTPATIENT
Start: 2017-03-31 | End: 2017-04-28 | Stop reason: SDUPTHER

## 2017-03-31 NOTE — MR AVS SNAPSHOT
Visit Information Date & Time Provider Department Dept. Phone Encounter #  
 3/31/2017  9:20 AM Marii Sanchez MD Neurology [place] [address] [phone] [id number] Follow-up Instructions Return in about 4 weeks (around 4/28/2017). Your Appointments 4/10/2017 10:40 AM  
Follow Up with Marii Sanchez MD  
Neurology [facility] [address] ([address]) Appt Note: pain mgt sck3/1/17; F/UP, Pain [place] [address]  
 [address] [place] [address] [facility] [address] [address] [phone]  
  
   
 [address] [id number] [address] Upcoming Health Maintenance Date Due DTaP/Tdap/Td series (1 - Tdap) 2/17/2007 Pneumococcal 19-64 Highest Risk (2 of 3 - PCV13) 10/1/2010 PAP AKA CERVICAL CYTOLOGY 4/13/2019 Allergies as of 3/31/2017  Review Complete On: 3/31/2017 By: Leeland Peabody, LPN Severity Noted Reaction Type Reactions Compazine [Prochlorperazine Edisylate] High 07/11/2016   Systemic Nausea and Vomiting, Palpitations Fish Containing Products High 02/21/2017   Side Effect Rash An itchy rash appeared in about 1 hour Current Immunizations  Reviewed on 9/19/2014 Name Date Influenza Vaccine 9/9/2012 Influenza Vaccine Split 9/1/2011 Pneumococcal Vaccine (Unspecified Type) 10/1/2009 Not reviewed this visit You Were Diagnosed With   
  
 Codes Comments Chronic bilateral low back pain without sciatica    -  Primary ICD-10-CM: M54.5, G89.29 ICD-9-CM: 724.2, 338.29 Vitals BP Pulse Height(growth percentile) Weight(growth percentile) SpO2 BMI  
 (!) 158/98 (BP 1 Location: Left arm, BP Patient Position: Sitting) (!) 102 5' 5\" (1.651 m) 186 lb (84.4 kg) 95% 30.95 kg/m2 OB Status Smoking Status Having regular periods Never Smoker Vitals History BMI and BSA Data Body Mass Index Body Surface Area 30.95 kg/m 2 1.97 m 2 Preferred Pharmacy Pharmacy Name Phone [address], [address] Your Updated Medication List  
  
   
This list is accurate as of: 3/31/17  9:59 AM.  Always use your most recent med list.  
  
  
  
  
 allopurinol 100 mg tablet Commonly known as:  Pilar Bolus Take 100 mg by mouth daily. amLODIPine 10 mg tablet Commonly known as:  Ted Bills Take 1 Tab by mouth daily. AURYXIA 210 mg iron tablet Generic drug:  ferric citrate TK 2 TS PO WITH MEALS  
  
 calcitRIOL 0.25 mcg capsule Commonly known as:  ROCALTROL Take 0.25 mcg by mouth daily. cloNIDine HCl 0.3 mg tablet Commonly known as:  CATAPRES Take 1 Tab by mouth two (2) times a day. For blood pressure  
  
 cyanocobalamin 2,500 mcg sublingual tablet Commonly known as:  VITAMIN B-12 Take 1 Tab by mouth daily. furosemide 40 mg tablet Commonly known as:  LASIX Take 1 Tab by mouth daily. For fluid  
  
 losartan 50 mg tablet Commonly known as:  COZAAR Take 1 Tab by mouth daily. For blood pressure  
  
 metoprolol succinate 100 mg tablet Commonly known as:  TOPROL-XL Take 1 Tab by mouth daily. For blood pressure  
  
 ondansetron hcl 8 mg tablet Commonly known as:  Elena Madrigal Take 1 Tab by mouth every eight (8) hours as needed. For Nausea  Indications: GASTROPARESIS  
  
 oxyCODONE IR 5 mg immediate release tablet Commonly known as:  Earl Jefry Take 1 tab in AM and HALF to 1 tab in PM if needed on days where back pain is severe  
  
 pantoprazole 40 mg tablet Commonly known as:  PROTONIX Take 1 Tab by mouth Daily (before breakfast). PLAQUENIL 200 mg tablet Generic drug:  hydroxychloroquine Take 200 mg by mouth two (2) times a day. predniSONE 10 mg tablet Commonly known as:  Rachael Carla Take 9 mg by mouth daily (with breakfast). Senna 8.6 mg tablet Generic drug:  senna Take 1 Tab by mouth daily as needed. SENSIPAR 30 mg tablet Generic drug:  cinacalcet Take 30 mg by mouth daily. VITAMIN D2 50,000 unit capsule Generic drug:  ergocalciferol Take 50,000 Units by mouth every seven (7) days. warfarin 2.5 mg tablet Commonly known as:  COUMADIN Take 1 Tab by mouth daily. Prescriptions Printed Refills  
 oxyCODONE IR (ROXICODONE) 5 mg immediate release tablet 0 Sig: Take 1 tab in AM and HALF to 1 tab in PM if needed on days where back pain is severe 
   
 Class: Print Follow-up Instructions Return in about 4 weeks (around 4/28/2017). Introducing [facility] & HEALTH SERVICES! [employer] introduces [employer] patient portal. Now you can access parts of your medical record, email your doctor's office, and request medication refills online. 1. In your internet browser, go to [email or web address] 2. Click on the First Time User? Click Here link in the Sign In box. You will see the New Member Sign Up page. 3. Enter your [employer] Access Code exactly as it appears below. You will not need to use this code after youve completed the sign-up process. If you do not sign up before the expiration date, you must request a new code. · [employer] Access Code: [email or web address] Expires: 4/4/2017 12:37 PM 
 
4. Enter the last four digits of your Social Security Number (xxxx) and Date of Birth (mm/dd/yyyy) as indicated and click Submit. You will be taken to the next sign-up page. 5. Create a [employer] In[employer] ID. This will be your [employer] login ID and cannot be changed, so think of one that is secure and easy to remember. 6. Create a [employer] password. You can change your password at any time. 7. Enter your Password Reset Question and Answer. This can be used at a later time if you forget your password. 8. Enter your e-mail address. You will receive e-mail notification when new information is available in [address] 9. Click Sign Up. You can now view and download portions of your medical record. 10. Click the Download Summary menu link to download a portable copy of your medical information. If you have questions, please visit the Frequently Asked Questions section of the [employer] website. Remember, [employer] is NOT to be used for urgent needs. For medical emergencies, dial 911. Now available from your iPhone and Android! Please provide this summary of care documentation to your next provider. Your primary care clinician is listed as Wilman Miller. If you have any questions after today's visit, please call [phone].

## 2017-03-31 NOTE — PROGRESS NOTES
Interval HPI:     This is a 33 y.o. female who is following up for     PMHx: chronic back pain, hx of fibromyalgia, hx of DVT/ coumadin, hx of Lupus, mild lower lumbar disc degeneration (L4-5 and L5-S1 with small annular tear at L5-S1)    Chief Complaint   Patient presents with    Pain (Chronic)     Continues to have pain across lower back, not going into legs. Most days rates it as 4-5/ 10 when waking up. Pain goes down to 2/ 10 after she takes one of her OxyCodone IR tablets and remains there for the rest of the day usually. Sometimes she might have to take an extra half tab later in the day. Reports running out of her pain medication yesterday     reviewed: only filling pain Rx from my office. Last UDS in Dec 2016 was consistent with Rx meds    Tried/ failed: Gabapentin, Cymbalta, Amitriptyline (only helped get to sleep), Lyrica (abnormal behaviors)    Brief ROS: as above  There have been no significant changes in PMHx, PSHx, SHx except as noted above. Allergies   Allergen Reactions    Compazine [Prochlorperazine Edisylate] Nausea and Vomiting and Palpitations    Fish Containing Products Rash     An itchy rash appeared in about 1 hour     Current Outpatient Prescriptions   Medication Sig Dispense Refill    oxyCODONE IR (ROXICODONE) 5 mg immediate release tablet Take 1 tab in AM and HALF to 1 tab in PM if needed on days where back pain is severe    40 Tab 0    warfarin (COUMADIN) 2.5 mg tablet Take 1 Tab by mouth daily. 30 Tab 5    amLODIPine (NORVASC) 10 mg tablet Take 1 Tab by mouth daily. 30 Tab 5    losartan (COZAAR) 50 mg tablet Take 1 Tab by mouth daily. For blood pressure 30 Tab 5    cloNIDine HCl (CATAPRES) 0.3 mg tablet Take 1 Tab by mouth two (2) times a day. For blood pressure 60 Tab 5    metoprolol succinate (TOPROL-XL) 100 mg tablet Take 1 Tab by mouth daily. For blood pressure 30 Tab 5    furosemide (LASIX) 40 mg tablet Take 1 Tab by mouth daily.  For fluid 30 Tab 5    cinacalcet (SENSIPAR) 30 mg tablet Take 30 mg by mouth daily.  AURYXIA 210 mg iron tablet TK 2 TS PO WITH MEALS  12    predniSONE (DELTASONE) 10 mg tablet Take 9 mg by mouth daily (with breakfast).  ergocalciferol (VITAMIN D2) 50,000 unit capsule Take 50,000 Units by mouth every seven (7) days.  hydroxychloroquine (PLAQUENIL) 200 mg tablet Take 200 mg by mouth two (2) times a day.  ondansetron hcl (ZOFRAN) 8 mg tablet Take 1 Tab by mouth every eight (8) hours as needed. For Nausea  Indications: GASTROPARESIS 15 Tab 1    allopurinol (ZYLOPRIM) 100 mg tablet Take 100 mg by mouth daily.  cyanocobalamin (VITAMIN B-12) 2,500 mcg sublingual tablet Take 1 Tab by mouth daily. 90 Tab 1    pantoprazole (PROTONIX) 40 mg tablet Take 1 Tab by mouth Daily (before breakfast). 30 Tab 0    calcitRIOL (ROCALTROL) 0.25 mcg capsule Take 0.25 mcg by mouth daily. 5    senna (SENNA) 8.6 mg tablet Take 1 Tab by mouth daily as needed. Physical Exam  Blood pressure (!) 158/98, pulse (!) 102, height 5' 5\" (1.651 m), weight 84.4 kg (186 lb), SpO2 95 %. No acute distress    MSK:  Lumbar spine:   + tenderness across lower back   Mild pain with back flexion = extension  No worsening of pain with facet loading to either side      Focused Neurological Exam     Mental status: Alert and oriented to person, place situation  CNs: EOMI, Face symmetric, Hearing/ Language normal  Sensory: intact light touch in both legs  Motor: Normal bulk and strength in all 4 extremities. Reflexes: 2+ patellars  Gait: antalgic    Impression    ICD-10-CM ICD-9-CM    1. Chronic bilateral low back pain without sciatica M54.5 724.2 oxyCODONE IR (ROXICODONE) 5 mg immediate release tablet    G89.29 338.29         Renewed Rx Oxycodone IR 5 mg tablet (#40 tabs) to take 1 tab in AM and occasionally HALF to one tab in evenings if pain is severe. Checked UDS today.   Follow up in 4 weeks

## 2017-03-31 NOTE — PROGRESS NOTES
Pill count =  Pt did not bring  UDS obtained and sent =  Pain contract = on file   made available for MD review  Script given for

## 2017-04-06 LAB — DRUGS UR: NORMAL

## 2017-04-28 ENCOUNTER — OFFICE VISIT (OUTPATIENT)
Dept: NEUROLOGY | Age: 32
End: 2017-04-28

## 2017-04-28 VITALS
HEART RATE: 100 BPM | DIASTOLIC BLOOD PRESSURE: 132 MMHG | WEIGHT: 186 LBS | HEIGHT: 65 IN | SYSTOLIC BLOOD PRESSURE: 186 MMHG | OXYGEN SATURATION: 95 % | BODY MASS INDEX: 30.99 KG/M2

## 2017-04-28 DIAGNOSIS — G89.29 CHRONIC BILATERAL LOW BACK PAIN WITHOUT SCIATICA: Primary | ICD-10-CM

## 2017-04-28 DIAGNOSIS — M54.50 CHRONIC BILATERAL LOW BACK PAIN WITHOUT SCIATICA: Primary | ICD-10-CM

## 2017-04-28 RX ORDER — OXYCODONE HYDROCHLORIDE 5 MG/1
TABLET ORAL
Qty: 40 TAB | Refills: 0 | Status: SHIPPED | OUTPATIENT
Start: 2017-04-28 | End: 2017-05-26 | Stop reason: SDUPTHER

## 2017-04-28 NOTE — MR AVS SNAPSHOT
Visit Information Date & Time Provider Department Dept. Phone Encounter #  
 4/28/2017  8:20 AM Myles Mcgill MD Neurology [facility]  Follow-up Instructions Return in about 4 weeks (around 5/26/2017). Your Appointments 5/26/2017  9:00 AM  
Follow Up with Myles Mcgill MD  
Neurology [facility] [address] [place] [address] [facility] Appt Note: follow up pain mgt sck4/28/17  
 [place] [address] [facility] [address] [facility] [address] [phone]  
  
   
 [place] [address] [id number] [address] Upcoming Health Maintenance Date Due DTaP/Tdap/Td series (1 - Tdap) 2/17/2007 Pneumococcal 19-64 Highest Risk (2 of 3 - PCV13) 10/1/2010 PAP AKA CERVICAL CYTOLOGY 4/13/2019 Allergies as of 4/28/2017  Review Complete On: 4/28/2017 By: Colleen Carranza LPN Severity Noted Reaction Type Reactions Compazine [Prochlorperazine Edisylate] High 07/11/2016   Systemic Nausea and Vomiting, Palpitations Fish Containing Products High 02/21/2017   Side Effect Rash An itchy rash appeared in about 1 hour Current Immunizations  Reviewed on 9/19/2014 Name Date Influenza Vaccine 9/9/2012 Influenza Vaccine Split 9/1/2011 Pneumococcal Vaccine (Unspecified Type) 10/1/2009 Not reviewed this visit You Were Diagnosed With   
  
 Codes Comments Chronic bilateral low back pain without sciatica    -  Primary ICD-10-CM: M54.5, G89.29 ICD-9-CM: 724.2, 338.29 Vitals BP Pulse Height(growth percentile) Weight(growth percentile) SpO2 BMI  
 (!) 186/132 (BP 1 Location: Left arm, BP Patient Position: Sitting) 100 5' 5\" (1.651 m) 186 lb (84.4 kg) 95% 30.95 kg/m2 OB Status Smoking Status Having regular periods Never Smoker Vitals History BMI and BSA Data Body Mass Index Body Surface Area 30.95 kg/m 2 1.97 m 2 Preferred Pharmacy Pharmacy Name Phone [address], [address] Your Updated Medication List  
  
   
This list is accurate as of: 4/28/17  9:06 AM.  Always use your most recent med list.  
  
  
  
  
 allopurinol 100 mg tablet Commonly known as:  Ever Gobble Take 100 mg by mouth daily. amLODIPine 10 mg tablet Commonly known as:  Marie Staggers Take 1 Tab by mouth daily. AURYXIA 210 mg iron tablet Generic drug:  ferric citrate TK 2 TS PO WITH MEALS  
  
 calcitRIOL 0.25 mcg capsule Commonly known as:  ROCALTROL Take 0.25 mcg by mouth daily. cloNIDine HCl 0.3 mg tablet Commonly known as:  CATAPRES Take 1 Tab by mouth two (2) times a day. For blood pressure  
  
 cyanocobalamin 2,500 mcg sublingual tablet Commonly known as:  VITAMIN B-12 Take 1 Tab by mouth daily. furosemide 40 mg tablet Commonly known as:  LASIX Take 1 Tab by mouth daily. For fluid  
  
 losartan 50 mg tablet Commonly known as:  COZAAR Take 1 Tab by mouth daily. For blood pressure  
  
 metoprolol succinate 100 mg tablet Commonly known as:  TOPROL-XL Take 1 Tab by mouth daily. For blood pressure  
  
 ondansetron hcl 8 mg tablet Commonly known as:  Crawford Melter Take 1 Tab by mouth every eight (8) hours as needed. For Nausea  Indications: GASTROPARESIS  
  
 oxyCODONE IR 5 mg immediate release tablet Commonly known as:  Sheree Юлия Take 1 tab in AM and HALF to 1 tab in PM if needed on days where back pain is severe  
  
 pantoprazole 40 mg tablet Commonly known as:  PROTONIX Take 1 Tab by mouth Daily (before breakfast). PLAQUENIL 200 mg tablet Generic drug:  hydroxychloroquine Take 200 mg by mouth two (2) times a day. predniSONE 10 mg tablet Commonly known as:  Laqueta Maw Take 9 mg by mouth daily (with breakfast). Senna 8.6 mg tablet Generic drug:  senna Take 1 Tab by mouth daily as needed. SENSIPAR 30 mg tablet Generic drug:  cinacalcet Take 30 mg by mouth daily. VITAMIN D2 50,000 unit capsule Generic drug:  ergocalciferol Take 50,000 Units by mouth every seven (7) days. warfarin 2.5 mg tablet Commonly known as:  COUMADIN Take 1 Tab by mouth daily. Prescriptions Printed Refills  
 oxyCODONE IR (ROXICODONE) 5 mg immediate release tablet 0 Sig: Take 1 tab in AM and HALF to 1 tab in PM if needed on days where back pain is severe 
   
 Class: Print Follow-up Instructions Return in about 4 weeks (around 5/26/2017). Introducing [facility] & HEALTH SERVICES! Nere[employer] Phillip introduces [employer] patient portal. Now you can access parts of your medical record, email your doctor's office, and request medication refills online. 1. In your internet browser, go to [email or web address] 2. Click on the First Time User? Click Here link in the Sign In box. You will see the New Member Sign Up page. 3. Enter your [employer] Access Code exactly as it appears below. You will not need to use this code after youve completed the sign-up process. If you do not sign up before the expiration date, you must request a new code. · [employer] Access Code: [email or web address] Expires: 7/27/2017  9:06 AM 
 
4. Enter the last four digits of your Social Security Number (xxxx) and Date of Birth (mm/dd/yyyy) as indicated and click Submit. You will be taken to the next sign-up page. 5. Create a [employer] ID. This will be your [employer] login ID and cannot be changed, so think of one that is secure and easy to remember. 6. Create a [employer] password. You can change your password at any time. 7. Enter your Password Reset Question and Answer. This can be used at a later time if you forget your password. 8. Enter your e-mail address. You will receive e-mail notification when new information is available in [address] 9. Click Sign Up. You can now view and download portions of your medical record. 10. Click the Download Summary menu link to download a portable copy of your medical information. If you have questions, please visit the Frequently Asked Questions section of the [employer] website. Remember, [employer] is NOT to be used for urgent needs. For medical emergencies, dial 911. Now available from your iPhone and Android! Please provide this summary of care documentation to your next provider. Your primary care clinician is listed as Lorena Scruggs. If you have any questions after today's visit, please call [phone].

## 2017-04-28 NOTE — PROGRESS NOTES
Interval HPI:     This is a 33 y.o. female who is following up for     PMHx: chronic back pain, hx of fibromyalgia, hx of DVT/ coumadin, hx of Lupus, mild lower lumbar disc degeneration (L4-5 and L5-S1 with small annular tear at L5-S1)    Chief Complaint   Patient presents with    Back Pain     Continues to have pain across lower back, not going into legs. Rates her pain with the medication as 3-4/ 10. Never runs out so pain levels have been pretty consistent  Takes one Oxycodone IR 5 mg tabs in AM when getting out of bed. May take HALF more later in the day if she's very active. Patient states her pain management goals is to take minimal pain medication that allows her to participate in routine household activity (cleaning up, etc) and which allows her to go out of the house with her children. She says the pain medication allows her to do that. She denies any craving or urges that she can't be without her pain medications. Pill count: 3 Oxycodone tablets left   reviewed: only filling pain Rx from my office. Last UDS in March 2017 was consistent with Rx pain medications    Reviewed Med list: no other sedating medications    Tried/ failed: Gabapentin, Cymbalta, Amitriptyline (only helped get to sleep), Lyrica (abnormal behaviors)      Brief ROS: as above  There have been no significant changes in PMHx, PSHx, SHx except as noted above. Allergies   Allergen Reactions    Compazine [Prochlorperazine Edisylate] Nausea and Vomiting and Palpitations    Fish Containing Products Rash     An itchy rash appeared in about 1 hour     Current Outpatient Prescriptions   Medication Sig Dispense Refill    oxyCODONE IR (ROXICODONE) 5 mg immediate release tablet Take 1 tab in AM and HALF to 1 tab in PM if needed on days where back pain is severe    40 Tab 0    warfarin (COUMADIN) 2.5 mg tablet Take 1 Tab by mouth daily. 30 Tab 5    amLODIPine (NORVASC) 10 mg tablet Take 1 Tab by mouth daily.  30 Tab 5  losartan (COZAAR) 50 mg tablet Take 1 Tab by mouth daily. For blood pressure 30 Tab 5    cloNIDine HCl (CATAPRES) 0.3 mg tablet Take 1 Tab by mouth two (2) times a day. For blood pressure 60 Tab 5    metoprolol succinate (TOPROL-XL) 100 mg tablet Take 1 Tab by mouth daily. For blood pressure 30 Tab 5    furosemide (LASIX) 40 mg tablet Take 1 Tab by mouth daily. For fluid 30 Tab 5    cinacalcet (SENSIPAR) 30 mg tablet Take 30 mg by mouth daily.  AURYXIA 210 mg iron tablet TK 2 TS PO WITH MEALS  12    predniSONE (DELTASONE) 10 mg tablet Take 9 mg by mouth daily (with breakfast).  ergocalciferol (VITAMIN D2) 50,000 unit capsule Take 50,000 Units by mouth every seven (7) days.  hydroxychloroquine (PLAQUENIL) 200 mg tablet Take 200 mg by mouth two (2) times a day.  ondansetron hcl (ZOFRAN) 8 mg tablet Take 1 Tab by mouth every eight (8) hours as needed. For Nausea  Indications: GASTROPARESIS 15 Tab 1    allopurinol (ZYLOPRIM) 100 mg tablet Take 100 mg by mouth daily.  cyanocobalamin (VITAMIN B-12) 2,500 mcg sublingual tablet Take 1 Tab by mouth daily. 90 Tab 1    pantoprazole (PROTONIX) 40 mg tablet Take 1 Tab by mouth Daily (before breakfast). 30 Tab 0    calcitRIOL (ROCALTROL) 0.25 mcg capsule Take 0.25 mcg by mouth daily. 5    senna (SENNA) 8.6 mg tablet Take 1 Tab by mouth daily as needed. Physical Exam  Blood pressure (!) 186/132, pulse 100, height 5' 5\" (1.651 m), weight 84.4 kg (186 lb), SpO2 95 %. No acute distress    MSK:  Lumbar spine:   + tenderness across lower back   Mild pain with back flexion = extension  Increased pain with facet loading to either side      Focused Neurological Exam     Mental status:   Alert and oriented to person, place situation  Mood appears stable  Normal thought processes    CNs: EOMI, Face symmetric, Hearing/ Language normal  Sensory: intact light touch in both legs  Motor: Normal bulk and strength in all 4 extremities.     Reflexes: 2+ patellars  Gait: antalgic    Impression    ICD-10-CM ICD-9-CM    1. Chronic bilateral low back pain without sciatica M54.5 724.2 oxyCODONE IR (ROXICODONE) 5 mg immediate release tablet    G89.29 338.29         Renewed Rx Oxycodone IR 5 mg tablet (#40 tabs) to take 1 tab in AM and occasionally HALF to one tab in evenings if pain is severe.    Follow up in 4 weeks

## 2017-05-26 ENCOUNTER — OFFICE VISIT (OUTPATIENT)
Dept: NEUROLOGY | Age: 32
End: 2017-05-26

## 2017-05-26 VITALS
DIASTOLIC BLOOD PRESSURE: 118 MMHG | WEIGHT: 186 LBS | SYSTOLIC BLOOD PRESSURE: 162 MMHG | OXYGEN SATURATION: 95 % | HEIGHT: 65 IN | HEART RATE: 77 BPM | BODY MASS INDEX: 30.99 KG/M2

## 2017-05-26 DIAGNOSIS — M54.50 CHRONIC BILATERAL LOW BACK PAIN WITHOUT SCIATICA: Primary | ICD-10-CM

## 2017-05-26 DIAGNOSIS — G89.29 CHRONIC BILATERAL LOW BACK PAIN WITHOUT SCIATICA: Primary | ICD-10-CM

## 2017-05-26 RX ORDER — OXYCODONE HYDROCHLORIDE 5 MG/1
TABLET ORAL
Qty: 40 TAB | Refills: 0 | Status: SHIPPED | OUTPATIENT
Start: 2017-05-26 | End: 2017-06-23 | Stop reason: SDUPTHER

## 2017-05-26 NOTE — PROGRESS NOTES
Pill count =  Patient did not bring but was instructed to bring with her to every appointment      UDS obtained and sent =  Pain contract = on file   made available for MD review.

## 2017-05-26 NOTE — PROGRESS NOTES
Interval HPI:     This is a 33 y.o. female who is following up for     PMHx: chronic back pain, hx of fibromyalgia, hx of DVT/ coumadin, hx of Lupus, mild lower lumbar disc degeneration (L4-5 and L5-S1 with small annular tear at L5-S1)    Chief Complaint   Patient presents with    Pain (Chronic)     Continues to have pain across lower back, not going into legs. Taking Oxycodone IR 5 mg, one tab every morning (she says between 10-11 AM) and that reduces her back pain to the point where she can participate in her daily activities at home and outside the house. Rates her pain without her pain medication as 8-9/ 10. Rates her pain with her medications as 2-4/ 10. Last took pain medication yesterday AM.  Says her medication generally lasts her the full 4 weeks, doesn't typically run out early. Had asked her to start bringing in bottles to each visit. She says mother gives her her pain medication but she forgot to ask her to give her the bottle so she could bring in today.  reviewed: only filling pain Rx from my office. Last UDS in March 2017 was consistent with Rx pain medications  Tried/ failed: Gabapentin, Cymbalta, Amitriptyline (only helped get to sleep), Lyrica (abnormal behaviors)      Brief ROS: as above  There have been no significant changes in PMHx, PSHx, SHx except as noted above. Allergies   Allergen Reactions    Compazine [Prochlorperazine Edisylate] Nausea and Vomiting and Palpitations    Fish Containing Products Rash     An itchy rash appeared in about 1 hour     Current Outpatient Prescriptions   Medication Sig Dispense Refill    oxyCODONE IR (ROXICODONE) 5 mg immediate release tablet Take 1 tab in AM and HALF to 1 tab in PM if needed on days where back pain is severe    40 Tab 0    warfarin (COUMADIN) 2.5 mg tablet Take 1 Tab by mouth daily. 30 Tab 5    amLODIPine (NORVASC) 10 mg tablet Take 1 Tab by mouth daily.  30 Tab 5    losartan (COZAAR) 50 mg tablet Take 1 Tab by mouth daily. For blood pressure 30 Tab 5    cloNIDine HCl (CATAPRES) 0.3 mg tablet Take 1 Tab by mouth two (2) times a day. For blood pressure 60 Tab 5    metoprolol succinate (TOPROL-XL) 100 mg tablet Take 1 Tab by mouth daily. For blood pressure 30 Tab 5    furosemide (LASIX) 40 mg tablet Take 1 Tab by mouth daily. For fluid 30 Tab 5    cinacalcet (SENSIPAR) 30 mg tablet Take 30 mg by mouth daily.  AURYXIA 210 mg iron tablet TK 2 TS PO WITH MEALS  12    predniSONE (DELTASONE) 10 mg tablet Take 9 mg by mouth daily (with breakfast).  ergocalciferol (VITAMIN D2) 50,000 unit capsule Take 50,000 Units by mouth every seven (7) days.  hydroxychloroquine (PLAQUENIL) 200 mg tablet Take 200 mg by mouth two (2) times a day.  ondansetron hcl (ZOFRAN) 8 mg tablet Take 1 Tab by mouth every eight (8) hours as needed. For Nausea  Indications: GASTROPARESIS 15 Tab 1    allopurinol (ZYLOPRIM) 100 mg tablet Take 100 mg by mouth daily.  cyanocobalamin (VITAMIN B-12) 2,500 mcg sublingual tablet Take 1 Tab by mouth daily. 90 Tab 1    pantoprazole (PROTONIX) 40 mg tablet Take 1 Tab by mouth Daily (before breakfast). 30 Tab 0    calcitRIOL (ROCALTROL) 0.25 mcg capsule Take 0.25 mcg by mouth daily. 5    senna (SENNA) 8.6 mg tablet Take 1 Tab by mouth daily as needed. Physical Exam  Blood pressure (!) 162/118, pulse 77, height 5' 5\" (1.651 m), weight 84.4 kg (186 lb), SpO2 95 %. No acute distress, Awake, alert, oriented  CV: rrr  Pulm: mild wheeze on left side    MSK:  Lumbar spine:   + tenderness across lower back   + pain with back flexion, extension, and rotation to both sides     Focused Neurological Exam     Mental status:   Alert and oriented to person, place situation  Mood appears stable  Normal thought processes    CNs: EOMI, Face symmetric, Hearing/ Language normal  Sensory: intact light touch in both legs  Motor: Normal bulk and strength in all 4 extremities.     Reflexes: 2+ patellars  Gait: not observed today    Impression    ICD-10-CM ICD-9-CM    1. Chronic bilateral low back pain without sciatica M54.5 724.2 oxyCODONE IR (ROXICODONE) 5 mg immediate release tablet    G89.29 338.29         Renewed Rx Oxycodone IR 5 mg tablet (#40 tabs) to take 1 tab in AM and occasionally HALF to one tab in evenings if pain is severe. Discussed with patient that she has to bring in pain medication bottles to each visit so I can review/ count, and if she doesn't, I will have to stop prescribing her pain medication. Check UDS at next visit. Follow up in 4 weeks.

## 2017-05-26 NOTE — MR AVS SNAPSHOT
Visit Information Date & Time Provider Department Dept. Phone Encounter #  
 5/26/2017  9:00 AM Ha Joseph MD Neurology [facility] [phone] [id number] Follow-up Instructions Return in about 4 weeks (around 6/23/2017). Upcoming Health Maintenance Date Due DTaP/Tdap/Td series (1 - Tdap) 2/17/2007 Pneumococcal 19-64 Highest Risk (2 of 3 - PCV13) 10/1/2010 INFLUENZA AGE 9 TO ADULT 8/1/2017 PAP AKA CERVICAL CYTOLOGY 4/13/2019 Allergies as of 5/26/2017  Review Complete On: 5/26/2017 By: Brandie Peters LPN Severity Noted Reaction Type Reactions Compazine [Prochlorperazine Edisylate] High 07/11/2016   Systemic Nausea and Vomiting, Palpitations Fish Containing Products High 02/21/2017   Side Effect Rash An itchy rash appeared in about 1 hour Current Immunizations  Reviewed on 9/19/2014 Name Date Influenza Vaccine 9/9/2012 Influenza Vaccine Split 9/1/2011 Pneumococcal Vaccine (Unspecified Type) 10/1/2009 Not reviewed this visit You Were Diagnosed With   
  
 Codes Comments Chronic bilateral low back pain without sciatica    -  Primary ICD-10-CM: M54.5, G89.29 ICD-9-CM: 724.2, 338.29 Vitals BP Pulse Height(growth percentile) Weight(growth percentile) SpO2 BMI  
 (!) 162/118 (BP 1 Location: Left arm, BP Patient Position: Sitting) 77 5' 5\" (1.651 m) 186 lb (84.4 kg) 95% 30.95 kg/m2 OB Status Smoking Status Having regular periods Never Smoker Vitals History BMI and BSA Data Body Mass Index Body Surface Area 30.95 kg/m 2 1.97 m 2 Preferred Pharmacy Pharmacy Name Phone [address], [address] Your Updated Medication List  
  
   
This list is accurate as of: 5/26/17  9:26 AM.  Always use your most recent med list.  
  
  
  
  
 allopurinol 100 mg tablet Commonly known as:  Chavo Valdes Take 100 mg by mouth daily. amLODIPine 10 mg tablet Commonly known as:  Tila Briones Take 1 Tab by mouth daily. AURYXIA 210 mg iron tablet Generic drug:  ferric citrate TK 2 TS PO WITH MEALS  
  
 calcitRIOL 0.25 mcg capsule Commonly known as:  ROCALTROL Take 0.25 mcg by mouth daily. cloNIDine HCl 0.3 mg tablet Commonly known as:  CATAPRES Take 1 Tab by mouth two (2) times a day. For blood pressure  
  
 cyanocobalamin 2,500 mcg sublingual tablet Commonly known as:  VITAMIN B-12 Take 1 Tab by mouth daily. furosemide 40 mg tablet Commonly known as:  LASIX Take 1 Tab by mouth daily. For fluid  
  
 losartan 50 mg tablet Commonly known as:  COZAAR Take 1 Tab by mouth daily. For blood pressure  
  
 metoprolol succinate 100 mg tablet Commonly known as:  TOPROL-XL Take 1 Tab by mouth daily. For blood pressure  
  
 ondansetron hcl 8 mg tablet Commonly known as:  Roosvelt Lat Take 1 Tab by mouth every eight (8) hours as needed. For Nausea  Indications: GASTROPARESIS  
  
 oxyCODONE IR 5 mg immediate release tablet Commonly known as:  Prisca Bunk Take 1 tab in AM and HALF to 1 tab in PM if needed on days where back pain is severe  
  
 pantoprazole 40 mg tablet Commonly known as:  PROTONIX Take 1 Tab by mouth Daily (before breakfast). PLAQUENIL 200 mg tablet Generic drug:  hydroxychloroquine Take 200 mg by mouth two (2) times a day. predniSONE 10 mg tablet Commonly known as:  Esthela Romulo Take 9 mg by mouth daily (with breakfast). Senna 8.6 mg tablet Generic drug:  senna Take 1 Tab by mouth daily as needed. SENSIPAR 30 mg tablet Generic drug:  cinacalcet Take 30 mg by mouth daily. VITAMIN D2 50,000 unit capsule Generic drug:  ergocalciferol Take 50,000 Units by mouth every seven (7) days. warfarin 2.5 mg tablet Commonly known as:  COUMADIN Take 1 Tab by mouth daily. Prescriptions Printed Refills  
 oxyCODONE IR (ROXICODONE) 5 mg immediate release tablet 0 Sig: Take 1 tab in AM and HALF to 1 tab in PM if needed on days where back pain is severe 
   
 Class: Print Follow-up Instructions Return in about 4 weeks (around 6/23/2017). Introducing [facility]! [facility] introduces [employer] patient portal. Now you can access parts of your medical record, email your doctor's office, and request medication refills online. 1. In your internet browser, go to [email or web address] 2. Click on the First Time User? Click Here link in the Sign In box. You will see the New Member Sign Up page. 3. Enter your [employer] Access Code exactly as it appears below. You will not need to use this code after youve completed the sign-up process. If you do not sign up before the expiration date, you must request a new code. · [employer] Access Code: [email or web address] Expires: 7/27/2017  9:06 AM 
 
4. Enter the last four digits of your Social Security Number (xxxx) and Date of Birth (mm/dd/yyyy) as indicated and click Submit. You will be taken to the next sign-up page. 5. Create a [employer] ID. This will be your [employer] login ID and cannot be changed, so think of one that is secure and easy to remember. 6. Create a [employer] password. You can change your password at any time. 7. Enter your Password Reset Question and Answer. This can be used at a later time if you forget your password. 8. Enter your e-mail address. You will receive e-mail notification when new information is available in [address] 9. Click Sign Up. You can now view and download portions of your medical record. 10. Click the Download Summary menu link to download a portable copy of your medical information. If you have questions, please visit the Frequently Asked Questions section of the [employer] website.  Remember, [employer] is NOT to be used for urgent needs. For medical emergencies, dial 911. Now available from your iPhone and Android! Please provide this summary of care documentation to your next provider. Your primary care clinician is listed as Jerrod Alonzo. If you have any questions after today's visit, please call [phone].

## 2017-06-23 ENCOUNTER — OFFICE VISIT (OUTPATIENT)
Dept: NEUROLOGY | Age: 32
End: 2017-06-23

## 2017-06-23 VITALS
HEIGHT: 65 IN | DIASTOLIC BLOOD PRESSURE: 104 MMHG | BODY MASS INDEX: 31.21 KG/M2 | HEART RATE: 76 BPM | RESPIRATION RATE: 20 BRPM | SYSTOLIC BLOOD PRESSURE: 166 MMHG | WEIGHT: 187.3 LBS | OXYGEN SATURATION: 95 %

## 2017-06-23 DIAGNOSIS — M54.50 CHRONIC BILATERAL LOW BACK PAIN WITHOUT SCIATICA: Primary | ICD-10-CM

## 2017-06-23 DIAGNOSIS — F11.90 CHRONIC, CONTINUOUS USE OF OPIOIDS: ICD-10-CM

## 2017-06-23 DIAGNOSIS — G89.29 CHRONIC BILATERAL LOW BACK PAIN WITHOUT SCIATICA: Primary | ICD-10-CM

## 2017-06-23 RX ORDER — OXYCODONE HYDROCHLORIDE 5 MG/1
TABLET ORAL
Qty: 40 TAB | Refills: 0 | Status: SHIPPED | OUTPATIENT
Start: 2017-06-23 | End: 2017-07-21 | Stop reason: SDUPTHER

## 2017-06-23 NOTE — PROGRESS NOTES
Interval HPI:     This is a 33 y.o. female who is following up for     PMHx: chronic back pain, hx of fibromyalgia, hx of DVT/ coumadin, hx of Lupus, mild lower lumbar disc degeneration (L4-5 and L5-S1 with small annular tear at L5-S1)    Chief Complaint   Patient presents with    Neurologic Problem     f/up pain      Pt presents with her teenage daughter for the visit. Pt says lower back pain is unchanged. Continues taking the Oxycodone IR 5 mg tab, one tab every AM, and occasionally HALF tab in evening if the pain is much worse. Rates her average daily pain with medication as 2-3/ 10. Only SEFx that she can tell from the Oxycodone is nausea if she takes it on an empty stomach. Brought in her medication bottle; Pill count = 4 oxycodone tablets. Last UDS in March 2017 was consistent with Rx pain medications. Tried/ failed: Gabapentin, Cymbalta, Amitriptyline (only helped get to sleep), Lyrica (abnormal behaviors)    Brief ROS: as above  There have been no significant changes in PMHx, PSHx, SHx except as noted above. Allergies   Allergen Reactions    Compazine [Prochlorperazine Edisylate] Nausea and Vomiting and Palpitations    Fish Containing Products Rash     An itchy rash appeared in about 1 hour     Current Outpatient Prescriptions   Medication Sig Dispense Refill    oxyCODONE IR (ROXICODONE) 5 mg immediate release tablet Take 1 tab in AM and HALF to 1 tab in PM if needed on days where back pain is severe    40 Tab 0    warfarin (COUMADIN) 2.5 mg tablet Take 1 Tab by mouth daily. 30 Tab 5    amLODIPine (NORVASC) 10 mg tablet Take 1 Tab by mouth daily. 30 Tab 5    losartan (COZAAR) 50 mg tablet Take 1 Tab by mouth daily. For blood pressure 30 Tab 5    cloNIDine HCl (CATAPRES) 0.3 mg tablet Take 1 Tab by mouth two (2) times a day. For blood pressure 60 Tab 5    metoprolol succinate (TOPROL-XL) 100 mg tablet Take 1 Tab by mouth daily.  For blood pressure 30 Tab 5    furosemide (LASIX) 40 mg tablet Take 1 Tab by mouth daily. For fluid 30 Tab 5    cinacalcet (SENSIPAR) 30 mg tablet Take 30 mg by mouth daily.  AURYXIA 210 mg iron tablet TK 2 TS PO WITH MEALS  12    predniSONE (DELTASONE) 10 mg tablet Take 9 mg by mouth daily (with breakfast).  ergocalciferol (VITAMIN D2) 50,000 unit capsule Take 50,000 Units by mouth every seven (7) days.  hydroxychloroquine (PLAQUENIL) 200 mg tablet Take 200 mg by mouth two (2) times a day.  ondansetron hcl (ZOFRAN) 8 mg tablet Take 1 Tab by mouth every eight (8) hours as needed. For Nausea  Indications: GASTROPARESIS 15 Tab 1    allopurinol (ZYLOPRIM) 100 mg tablet Take 100 mg by mouth daily.  cyanocobalamin (VITAMIN B-12) 2,500 mcg sublingual tablet Take 1 Tab by mouth daily. 90 Tab 1    pantoprazole (PROTONIX) 40 mg tablet Take 1 Tab by mouth Daily (before breakfast). 30 Tab 0    calcitRIOL (ROCALTROL) 0.25 mcg capsule Take 0.25 mcg by mouth daily. 5    senna (SENNA) 8.6 mg tablet Take 1 Tab by mouth daily as needed. Physical Exam  Blood pressure (!) 166/104, pulse 76, resp. rate 20, height 5' 5\" (1.651 m), weight 85 kg (187 lb 4.8 oz), SpO2 95 %. No acute distress, Awake, alert, oriented  CV: rrr  Pulm: mild wheeze on left side    MSK:  Lumbar spine:   + tenderness across lower back   + pain with back flexion, extension, and rotation to both sides     Focused Neurological Exam     Mental status:   Alert and oriented to person, place situation  Mood appears stable  Normal thought processes    CNs: EOMI, Face symmetric, Hearing/ Language normal  Sensory: intact light touch in both legs  Motor: Normal bulk and strength in all 4 extremities. Reflexes: 2+ patellars  Gait: not observed today    Impression    ICD-10-CM ICD-9-CM    1. Chronic bilateral low back pain without sciatica M54.5 724.2 oxyCODONE IR (ROXICODONE) 5 mg immediate release tablet    G89.29 338.29    2.  Chronic, continuous use of opioids F11.90 305.51 COMPLIANCE DRUG SCREEN/PRESCRIPTION MONITORING        Renewed Rx Oxycodone IR 5 mg tablet (#40 tabs) to take 1 tab in AM and occasionally HALF to one tab in evenings if pain is severe. Check UDS at next visit. Follow up in 4 weeks.

## 2017-06-23 NOTE — PATIENT INSTRUCTIONS
A Healthy Lifestyle: Care Instructions  Your Care Instructions  A healthy lifestyle can help you feel good, stay at a healthy weight, and have plenty of energy for both work and play. A healthy lifestyle is something you can share with your whole family. A healthy lifestyle also can lower your risk for serious health problems, such as high blood pressure, heart disease, and diabetes. You can follow a few steps listed below to improve your health and the health of your family. Follow-up care is a key part of your treatment and safety. Be sure to make and go to all appointments, and call your doctor if you are having problems. Its also a good idea to know your test results and keep a list of the medicines you take. How can you care for yourself at home? · Do not eat too much sugar, fat, or fast foods. You can still have dessert and treats now and then. The goal is moderation. · Start small to improve your eating habits. Pay attention to portion sizes, drink less juice and soda pop, and eat more fruits and vegetables. ¨ Eat a healthy amount of food. A 3-ounce serving of meat, for example, is about the size of a deck of cards. Fill the rest of your plate with vegetables and whole grains. ¨ Limit the amount of soda and sports drinks you have every day. Drink more water when you are thirsty. ¨ Eat at least 5 servings of fruits and vegetables every day. It may seem like a lot, but it is not hard to reach this goal. A serving or helping is 1 piece of fruit, 1 cup of vegetables, or 2 cups of leafy, raw vegetables. Have an apple or some carrot sticks as an afternoon snack instead of a candy bar. Try to have fruits and/or vegetables at every meal.  · Make exercise part of your daily routine. You may want to start with simple activities, such as walking, bicycling, or slow swimming. Try to be active 30 to 60 minutes every day. You do not need to do all 30 to 60 minutes all at once.  For example, you can exercise 3 times a day for 10 or 20 minutes. Moderate exercise is safe for most people, but it is always a good idea to talk to your doctor before starting an exercise program.  · Keep moving. Fern Mandes the lawn, work in the garden, or SHAPE. Take the stairs instead of the elevator at work. · If you smoke, quit. People who smoke have an increased risk for heart attack, stroke, cancer, and other lung illnesses. Quitting is hard, but there are ways to boost your chance of quitting tobacco for good. ¨ Use nicotine gum, patches, or lozenges. ¨ Ask your doctor about stop-smoking programs and medicines. ¨ Keep trying. In addition to reducing your risk of diseases in the future, you will notice some benefits soon after you stop using tobacco. If you have shortness of breath or asthma symptoms, they will likely get better within a few weeks after you quit. · Limit how much alcohol you drink. Moderate amounts of alcohol (up to 2 drinks a day for men, 1 drink a day for women) are okay. But drinking too much can lead to liver problems, high blood pressure, and other health problems. Family health  If you have a family, there are many things you can do together to improve your health. · Eat meals together as a family as often as possible. · Eat healthy foods. This includes fruits, vegetables, lean meats and dairy, and whole grains. · Include your family in your fitness plan. Most people think of activities such as jogging or tennis as the way to fitness, but there are many ways you and your family can be more active. Anything that makes you breathe hard and gets your heart pumping is exercise. Here are some tips:  ¨ Walk to do errands or to take your child to school or the bus. ¨ Go for a family bike ride after dinner instead of watching TV. Where can you learn more? Go to http://jorge-rochelle.info/. Enter O090 in the search box to learn more about \"A Healthy Lifestyle: Care Instructions. \"  Current as of: July 26, 2016  Content Version: 11.3  © 6275-6137 [employer]. Care instructions adapted under license by [employer] (which disclaims liability or warranty for this information). If you have questions about a medical condition or this instruction, always ask your healthcare professional. [address] any warranty or liability for your use of this information.

## 2017-06-23 NOTE — MR AVS SNAPSHOT
Visit Information Date & Time Provider Department Dept. Phone Encounter #  
 6/23/2017  9:20 AM [phone] [id number] Follow-up Instructions Return in about 4 weeks (around 7/21/2017). Upcoming Health Maintenance Date Due DTaP/Tdap/Td series (1 - Tdap) 2/17/2007 Pneumococcal 19-64 Highest Risk (2 of 3 - PCV13) 10/1/2010 INFLUENZA AGE 9 TO ADULT 8/1/2017 PAP AKA CERVICAL CYTOLOGY 4/13/2019 Allergies as of 6/23/2017  Review Complete On: 6/23/2017 By: Kimmy Jordan LPN Severity Noted Reaction Type Reactions Compazine [Prochlorperazine Edisylate] High 07/11/2016   Systemic Nausea and Vomiting, Palpitations Fish Containing Products High 02/21/2017   Side Effect Rash An itchy rash appeared in about 1 hour Current Immunizations  Reviewed on 9/19/2014 Name Date Influenza Vaccine 9/9/2012 Influenza Vaccine Split 9/1/2011 Pneumococcal Vaccine (Unspecified Type) 10/1/2009 Not reviewed this visit You Were Diagnosed With   
  
 Codes Comments Chronic bilateral low back pain without sciatica    -  Primary ICD-10-CM: M54.5, G89.29 ICD-9-CM: 724.2, 338.29 Chronic, continuous use of opioids     ICD-10-CM: F11.90 ICD-9-CM: 305.51 Vitals BP Pulse Resp Height(growth percentile) Weight(growth percentile) SpO2  
 (!) 166/104 76 20 5' 5\" (1.651 m) 187 lb 4.8 oz (85 kg) 95% BMI OB Status Smoking Status 31.17 kg/m2 Having regular periods Never Smoker Vitals History BMI and BSA Data Body Mass Index Body Surface Area  
 31.17 kg/m 2 1.97 m 2 Preferred Pharmacy Pharmacy Name Phone [address], [address] Your Updated Medication List  
  
   
This list is accurate as of: 6/23/17  9:42 AM.  Always use your most recent med list.  
  
  
  
  
 allopurinol 100 mg tablet Commonly known as:  Bettejane Ted Take 100 mg by mouth daily. amLODIPine 10 mg tablet Commonly known as:  Ofelia Rosado Take 1 Tab by mouth daily. AURYXIA 210 mg iron tablet Generic drug:  ferric citrate TK 2 TS PO WITH MEALS  
  
 calcitRIOL 0.25 mcg capsule Commonly known as:  ROCALTROL Take 0.25 mcg by mouth daily. cloNIDine HCl 0.3 mg tablet Commonly known as:  CATAPRES Take 1 Tab by mouth two (2) times a day. For blood pressure  
  
 cyanocobalamin 2,500 mcg sublingual tablet Commonly known as:  VITAMIN B-12 Take 1 Tab by mouth daily. furosemide 40 mg tablet Commonly known as:  LASIX Take 1 Tab by mouth daily. For fluid  
  
 losartan 50 mg tablet Commonly known as:  COZAAR Take 1 Tab by mouth daily. For blood pressure  
  
 metoprolol succinate 100 mg tablet Commonly known as:  TOPROL-XL Take 1 Tab by mouth daily. For blood pressure  
  
 ondansetron hcl 8 mg tablet Commonly known as:  Dalphine Gall Take 1 Tab by mouth every eight (8) hours as needed. For Nausea  Indications: GASTROPARESIS  
  
 oxyCODONE IR 5 mg immediate release tablet Commonly known as:  Judithe Burn Take 1 tab in AM and HALF to 1 tab in PM if needed on days where back pain is severe  
  
 pantoprazole 40 mg tablet Commonly known as:  PROTONIX Take 1 Tab by mouth Daily (before breakfast). PLAQUENIL 200 mg tablet Generic drug:  hydroxychloroquine Take 200 mg by mouth two (2) times a day. predniSONE 10 mg tablet Commonly known as:  Mounika Josefa Take 9 mg by mouth daily (with breakfast). Senna 8.6 mg tablet Generic drug:  senna Take 1 Tab by mouth daily as needed. SENSIPAR 30 mg tablet Generic drug:  cinacalcet Take 30 mg by mouth daily. VITAMIN D2 50,000 unit capsule Generic drug:  ergocalciferol Take 50,000 Units by mouth every seven (7) days. warfarin 2.5 mg tablet Commonly known as:  COUMADIN  
 Take 1 Tab by mouth daily. Prescriptions Printed Refills  
 oxyCODONE IR (ROXICODONE) 5 mg immediate release tablet 0 Sig: Take 1 tab in AM and HALF to 1 tab in PM if needed on days where back pain is severe 
   
 Class: Print We Performed the Following [address] [facility] [address] MONITORING [[id number] Custom] Follow-up Instructions Return in about 4 weeks (around 7/21/2017). Patient Instructions A Healthy Lifestyle: Care Instructions Your Care Instructions A healthy lifestyle can help you feel good, stay at a healthy weight, and have plenty of energy for both work and play. A healthy lifestyle is something you can share with your whole family. A healthy lifestyle also can lower your risk for serious health problems, such as high blood pressure, heart disease, and diabetes. You can follow a few steps listed below to improve your health and the health of your family. Follow-up care is a key part of your treatment and safety. Be sure to make and go to all appointments, and call your doctor if you are having problems. Its also a good idea to know your test results and keep a list of the medicines you take. How can you care for yourself at home? · Do not eat too much sugar, fat, or fast foods. You can still have dessert and treats now and then. The goal is moderation. · Start small to improve your eating habits. Pay attention to portion sizes, drink less juice and soda pop, and eat more fruits and vegetables. ¨ Eat a healthy amount of food. A 3-ounce serving of meat, for example, is about the size of a deck of cards. Fill the rest of your plate with vegetables and whole grains. ¨ Limit the amount of soda and sports drinks you have every day. Drink more water when you are thirsty. ¨ Eat at least 5 servings of fruits and vegetables every day.  It may seem like a lot, but it is not hard to reach this goal. A serving or helping is 1 piece of fruit, 1 cup of vegetables, or 2 cups of leafy, raw vegetables. Have an apple or some carrot sticks as an afternoon snack instead of a candy bar. Try to have fruits and/or vegetables at every meal. 
· Make exercise part of your daily routine. You may want to start with simple activities, such as walking, bicycling, or slow swimming. Try to be active 30 to 60 minutes every day. You do not need to do all 30 to 60 minutes all at once. For example, you can exercise 3 times a day for 10 or 20 minutes. Moderate exercise is safe for most people, but it is always a good idea to talk to your doctor before starting an exercise program. 
· Keep moving. Candido Milks the lawn, work in the garden, or CloSys. Take the stairs instead of the elevator at work. · If you smoke, quit. People who smoke have an increased risk for heart attack, stroke, cancer, and other lung illnesses. Quitting is hard, but there are ways to boost your chance of quitting tobacco for good. ¨ Use nicotine gum, patches, or lozenges. ¨ Ask your doctor about stop-smoking programs and medicines. ¨ Keep trying. In addition to reducing your risk of diseases in the future, you will notice some benefits soon after you stop using tobacco. If you have shortness of breath or asthma symptoms, they will likely get better within a few weeks after you quit. · Limit how much alcohol you drink. Moderate amounts of alcohol (up to 2 drinks a day for men, 1 drink a day for women) are okay. But drinking too much can lead to liver problems, high blood pressure, and other health problems. Family health If you have a family, there are many things you can do together to improve your health. · Eat meals together as a family as often as possible. · Eat healthy foods. This includes fruits, vegetables, lean meats and dairy, and whole grains. · Include your family in your fitness plan.  Most people think of activities such as jogging or tennis as the way to fitness, but there are many ways you and your family can be more active. Anything that makes you breathe hard and gets your heart pumping is exercise. Here are some tips: 
¨ Walk to do errands or to take your child to school or the bus. ¨ Go for a family bike ride after dinner instead of watching TV. Where can you learn more? Go to http://jorge-rochelle.info/. Enter E870 in the search box to learn more about \"A Healthy Lifestyle: Care Instructions. \" Current as of: July 26, 2016 Content Version: 11.3 © 6491-1072 Lee Silber. Care instructions adapted under license by [employer] (which disclaims liability or warranty for this information). If you have questions about a medical condition or this instruction, always ask your healthcare professional. [address] any warranty or liability for your use of this information. Introducing \[facility]\"" & HEALTH SERVICES! Sae[facility] Hoover introduces [employer] patient portal. Now you can access parts of your medical record, email your doctor's office, and request medication refills online. 1. In your internet browser, go to [email or web address] 2. Click on the First Time User? Click Here link in the Sign In box. You will see the New Member Sign Up page. 3. Enter your [employer] Access Code exactly as it appears below. You will not need to use this code after youve completed the sign-up process. If you do not sign up before the expiration date, you must request a new code. · [employer] Access Code: [email or web address] Expires: 7/27/2017  9:06 AM 
 
4. Enter the last four digits of your Social Security Number (xxxx) and Date of Birth (mm/dd/yyyy) as indicated and click Submit. You will be taken to the next sign-up page. 5. Create a [employer] ID.  This will be your [employer] login ID and cannot be changed, so think of one that is secure and easy to remember. 6. Create a RetAPPs password. You can change your password at any time. 7. Enter your Password Reset Question and Answer. This can be used at a later time if you forget your password. 8. Enter your e-mail address. You will receive e-mail notification when new information is available in [address] 9. Click Sign Up. You can now view and download portions of your medical record. 10. Click the Download Summary menu link to download a portable copy of your medical information. If you have questions, please visit the Frequently Asked Questions section of the Ret[employer] website. Remember, RetAPPs is NOT to be used for urgent needs. For medical emergencies, dial 911. Now available from your iPhone and Android! Please provide this summary of care documentation to your next provider. Your primary care clinician is listed as Osiris Otero. If you have any questions after today's visit, please call [phone].

## 2017-06-26 ENCOUNTER — HOSPITAL ENCOUNTER (OUTPATIENT)
Dept: GENERAL RADIOLOGY | Age: 32
Discharge: HOME | End: 2017-06-26
Payer: MEDICARE

## 2017-06-26 DIAGNOSIS — Z11.1 SCREENING-PULMONARY TB: ICD-10-CM

## 2017-06-26 PROCEDURE — 71020 XR CHEST PA LAT: CPT

## 2017-07-21 ENCOUNTER — OFFICE VISIT (OUTPATIENT)
Dept: NEUROLOGY | Age: 32
End: 2017-07-21

## 2017-07-21 VITALS
DIASTOLIC BLOOD PRESSURE: 96 MMHG | SYSTOLIC BLOOD PRESSURE: 142 MMHG | WEIGHT: 183 LBS | BODY MASS INDEX: 30.49 KG/M2 | HEIGHT: 65 IN

## 2017-07-21 DIAGNOSIS — Z51.81 ENCOUNTER FOR MONITORING OPIOID MAINTENANCE THERAPY: ICD-10-CM

## 2017-07-21 DIAGNOSIS — M54.50 CHRONIC BILATERAL LOW BACK PAIN WITHOUT SCIATICA: Primary | ICD-10-CM

## 2017-07-21 DIAGNOSIS — G89.29 CHRONIC BILATERAL LOW BACK PAIN WITHOUT SCIATICA: Primary | ICD-10-CM

## 2017-07-21 DIAGNOSIS — Z79.891 ENCOUNTER FOR MONITORING OPIOID MAINTENANCE THERAPY: ICD-10-CM

## 2017-07-21 RX ORDER — OXYCODONE HYDROCHLORIDE 5 MG/1
TABLET ORAL
Qty: 40 TAB | Refills: 0 | Status: SHIPPED | OUTPATIENT
Start: 2017-07-21 | End: 2017-08-18 | Stop reason: SDUPTHER

## 2017-07-21 NOTE — MR AVS SNAPSHOT
Visit Information Date & Time Provider Department Dept. Phone Encounter #  
 7/21/2017 11:20 AM Miriam Stovall MD [facility] Neurology [facility] [phone] [id number] Follow-up Instructions Return in about 4 weeks (around 8/18/2017). Follow-up and Disposition History Upcoming Health Maintenance Date Due DTaP/Tdap/Td series (1 - Tdap) 2/17/2007 Pneumococcal 19-64 Highest Risk (2 of 3 - PCV13) 10/1/2010 INFLUENZA AGE 9 TO ADULT 8/1/2017 PAP AKA CERVICAL CYTOLOGY 4/13/2019 Allergies as of 7/21/2017  Review Complete On: 6/23/2017 By: Missael Pope LPN Severity Noted Reaction Type Reactions Compazine [Prochlorperazine Edisylate] High 07/11/2016   Systemic Nausea and Vomiting, Palpitations Fish Containing Products High 02/21/2017   Side Effect Rash An itchy rash appeared in about 1 hour Current Immunizations  Reviewed on 9/19/2014 Name Date Influenza Vaccine 9/9/2012 Influenza Vaccine Split 9/1/2011 Pneumococcal Vaccine (Unspecified Type) 10/1/2009 Not reviewed this visit You Were Diagnosed With   
  
 Codes Comments Chronic bilateral low back pain without sciatica    -  Primary ICD-10-CM: M54.5, G89.29 ICD-9-CM: 724.2, 338.29 Vitals BP Height(growth percentile) Weight(growth percentile) BMI OB Status Smoking Status (!) 142/96 (BP 1 Location: Left arm, BP Patient Position: Sitting) 5' 5\" (1.651 m) 183 lb (83 kg) 30.45 kg/m2 Having regular periods Never Smoker BMI and BSA Data Body Mass Index Body Surface Area  
 30.45 kg/m 2 1.95 m 2 Preferred Pharmacy Pharmacy Name Phone [address], [address] Your Updated Medication List  
  
   
This list is accurate as of: 7/21/17 11:46 AM.  Always use your most recent med list.  
  
  
  
  
 allopurinol 100 mg tablet Commonly known as:  Vena Ditch Take 100 mg by mouth daily. amLODIPine 10 mg tablet Commonly known as:  Alissa Matar Take 1 Tab by mouth daily. AURYXIA 210 mg iron tablet Generic drug:  ferric citrate TK 2 TS PO WITH MEALS  
  
 calcitRIOL 0.25 mcg capsule Commonly known as:  ROCALTROL Take 0.25 mcg by mouth daily. cloNIDine HCl 0.3 mg tablet Commonly known as:  CATAPRES Take 1 Tab by mouth two (2) times a day. For blood pressure  
  
 cyanocobalamin 2,500 mcg sublingual tablet Commonly known as:  VITAMIN B-12 Take 1 Tab by mouth daily. furosemide 40 mg tablet Commonly known as:  LASIX Take 1 Tab by mouth daily. For fluid  
  
 losartan 50 mg tablet Commonly known as:  COZAAR Take 1 Tab by mouth daily. For blood pressure  
  
 metoprolol succinate 100 mg tablet Commonly known as:  TOPROL-XL Take 1 Tab by mouth daily. For blood pressure  
  
 ondansetron hcl 8 mg tablet Commonly known as:  Atwood Peat Take 1 Tab by mouth every eight (8) hours as needed. For Nausea  Indications: GASTROPARESIS  
  
 oxyCODONE IR 5 mg immediate release tablet Commonly known as:  Denisse Florissant Take 1 tab in AM and HALF to 1 tab in PM if needed on days where back pain is severe  
  
 pantoprazole 40 mg tablet Commonly known as:  PROTONIX Take 1 Tab by mouth Daily (before breakfast). PLAQUENIL 200 mg tablet Generic drug:  hydroxychloroquine Take 200 mg by mouth two (2) times a day. predniSONE 10 mg tablet Commonly known as:  José Manuel Titus Take 9 mg by mouth daily (with breakfast). Senna 8.6 mg tablet Generic drug:  senna Take 1 Tab by mouth daily as needed. SENSIPAR 30 mg tablet Generic drug:  cinacalcet Take 30 mg by mouth daily. VITAMIN D2 50,000 unit capsule Generic drug:  ergocalciferol Take 50,000 Units by mouth every seven (7) days. warfarin 2.5 mg tablet Commonly known as:  COUMADIN Take 1 Tab by mouth daily. Prescriptions Printed Refills  
 oxyCODONE IR (ROXICODONE) 5 mg immediate release tablet 0 Sig: Take 1 tab in AM and HALF to 1 tab in PM if needed on days where back pain is severe 
   
 Class: Print We Performed the Following 10-PANEL URINE DRUG SCREEN [[id number] Custom] Follow-up Instructions Return in about 4 weeks (around 8/18/2017). Introducing [facility] & HEALTH SERVICES! [facility] introduces Zwittle patient portal. Now you can access parts of your medical record, email your doctor's office, and request medication refills online. 1. In your internet browser, go to [email or web address] 2. Click on the First Time User? Click Here link in the Sign In box. You will see the New Member Sign Up page. 3. Enter your Zwittle Access Code exactly as it appears below. You will not need to use this code after youve completed the sign-up process. If you do not sign up before the expiration date, you must request a new code. · Zwittle Access Code: [email or web address] Expires: 7/27/2017  9:06 AM 
 
4. Enter the last four digits of your Social Security Number (xxxx) and Date of Birth (mm/dd/yyyy) as indicated and click Submit. You will be taken to the next sign-up page. 5. Create a Zwittle ID. This will be your Zwittle login ID and cannot be changed, so think of one that is secure and easy to remember. 6. Create a Zwittle password. You can change your password at any time. 7. Enter your Password Reset Question and Answer. This can be used at a later time if you forget your password. 8. Enter your e-mail address. You will receive e-mail notification when new information is available in [address] 9. Click Sign Up. You can now view and download portions of your medical record. 10. Click the Download Summary menu link to download a portable copy of your medical information.  
 
If you have questions, please visit the Frequently Asked Questions section of the [employer]. Remember, Novushart is NOT to be used for urgent needs. For medical emergencies, dial 911. Now available from your iPhone and Android! Please provide this summary of care documentation to your next provider. Your primary care clinician is listed as Holly Seth. If you have any questions after today's visit, please call [phone].

## 2017-07-21 NOTE — PROGRESS NOTES
Interval HPI:     This is a 33 y.o. female who is following up for     PMHx: chronic back pain, hx of fibromyalgia, hx of DVT/ coumadin, hx of Lupus, mild lower lumbar disc degeneration (L4-5 and L5-S1 with small annular tear at L5-S1)    Chief Complaint   Patient presents with    Pain (Chronic)       Pt says lower back pain is unchanged. Continues taking the Oxycodone IR 5 mg tab, one tab every AM, and occasionally HALF tab in evening if the pain is much worse. Rates her average daily pain with medication as 2-3/ 10. Only SEFx that she can tell from the Oxycodone is nausea if she takes it on an empty stomach. Reviewed : only filling pain Rx from my office (last fill 6-23-17), no aberrant behavior    Pill count: did not bring bottle    D/w patient that she must bring in her pain medication bottle so I can verify the remaining quantity. She says her mother who controls her medication had to leave early for a job interview so that's why she didn't bring it. Last UDS in March 2017 was consistent with Rx pain medications. Tried/ failed: Gabapentin, Cymbalta, Amitriptyline (only helped get to sleep), Lyrica (abnormal behaviors)    Brief ROS: as above  There have been no significant changes in PMHx, PSHx, SHx except as noted above. Allergies   Allergen Reactions    Compazine [Prochlorperazine Edisylate] Nausea and Vomiting and Palpitations    Fish Containing Products Rash     An itchy rash appeared in about 1 hour     Current Outpatient Prescriptions   Medication Sig Dispense Refill    oxyCODONE IR (ROXICODONE) 5 mg immediate release tablet Take 1 tab in AM and HALF to 1 tab in PM if needed on days where back pain is severe    40 Tab 0    warfarin (COUMADIN) 2.5 mg tablet Take 1 Tab by mouth daily. 30 Tab 5    amLODIPine (NORVASC) 10 mg tablet Take 1 Tab by mouth daily. 30 Tab 5    losartan (COZAAR) 50 mg tablet Take 1 Tab by mouth daily.  For blood pressure 30 Tab 5    cloNIDine HCl (CATAPRES) 0.3 mg tablet Take 1 Tab by mouth two (2) times a day. For blood pressure 60 Tab 5    metoprolol succinate (TOPROL-XL) 100 mg tablet Take 1 Tab by mouth daily. For blood pressure 30 Tab 5    furosemide (LASIX) 40 mg tablet Take 1 Tab by mouth daily. For fluid 30 Tab 5    cinacalcet (SENSIPAR) 30 mg tablet Take 30 mg by mouth daily.  AURYXIA 210 mg iron tablet TK 2 TS PO WITH MEALS  12    predniSONE (DELTASONE) 10 mg tablet Take 9 mg by mouth daily (with breakfast).  ergocalciferol (VITAMIN D2) 50,000 unit capsule Take 50,000 Units by mouth every seven (7) days.  hydroxychloroquine (PLAQUENIL) 200 mg tablet Take 200 mg by mouth two (2) times a day.  ondansetron hcl (ZOFRAN) 8 mg tablet Take 1 Tab by mouth every eight (8) hours as needed. For Nausea  Indications: GASTROPARESIS 15 Tab 1    allopurinol (ZYLOPRIM) 100 mg tablet Take 100 mg by mouth daily.  cyanocobalamin (VITAMIN B-12) 2,500 mcg sublingual tablet Take 1 Tab by mouth daily. 90 Tab 1    pantoprazole (PROTONIX) 40 mg tablet Take 1 Tab by mouth Daily (before breakfast). 30 Tab 0    calcitRIOL (ROCALTROL) 0.25 mcg capsule Take 0.25 mcg by mouth daily. 5    senna (SENNA) 8.6 mg tablet Take 1 Tab by mouth daily as needed. Physical Exam  Blood pressure (!) 142/96, height 5' 5\" (1.651 m), weight 83 kg (183 lb). No acute distress, Awake, alert, oriented  CV: rrr  Pulm: cta    MSK:  Lumbar spine:   + mild tenderness across lower back   + mild pain with back flexion, extension, and rotation to both sides     Focused Neurological Exam     Mental status:   Alert and oriented to person, place situation  Mood appears stable  Normal thought processes    CNs: EOMI, Face symmetric, Hearing/ Language normal  Sensory: intact light touch in both legs  Motor: Normal bulk and strength in all 4 extremities. Reflexes: 2+ patellars  Gait: mildly antalgic    Impression    ICD-10-CM ICD-9-CM    1.  Chronic bilateral low back pain without sciatica M54.5 724.2 oxyCODONE IR (ROXICODONE) 5 mg immediate release tablet    G89.29 338.29 10-PANEL URINE DRUG SCREEN        Check UDS at next visit. Reiterated to patient that she has to bring in pill bottle to each visit. Renewed Rx Oxycodone IR 5 mg tablet (#40 tabs) to take 1 tab in AM and occasionally HALF to one tab in evenings if pain is severe. Follow up in 4 weeks.

## 2017-07-21 NOTE — PROGRESS NOTES
Pill count =  Patient did not bring      UDS obtained and sent =  Pain contract = on file   made available for MD review.

## 2017-08-03 ENCOUNTER — TELEPHONE (OUTPATIENT)
Dept: NEUROLOGY | Age: 32
End: 2017-08-03

## 2017-08-03 NOTE — TELEPHONE ENCOUNTER
Received call from [facility] and spoke with Debbie Strange. She states that they cannot pull up the lab test order in her system, however could pull up the \"drug panel 9 with confirm, s/p\". She is requesting a new order be faxed. Informed her Dr Abraham Gay is out of the office, but I will see if MD on call (Dr Wesley Brumfield) will sign. She states if we do not receive the results by Tuesday to call her. Faxed signed order as requested.     [facility]  Phone # [phone]  Fax # [phone]

## 2017-08-17 NOTE — TELEPHONE ENCOUNTER
Attempted to contact Una Medina at [facility] to inform her the results of patients drug screen have not been received. No answer, left call back # on voicemail.

## 2017-08-18 ENCOUNTER — OFFICE VISIT (OUTPATIENT)
Dept: NEUROLOGY | Age: 32
End: 2017-08-18

## 2017-08-18 VITALS
SYSTOLIC BLOOD PRESSURE: 140 MMHG | WEIGHT: 183 LBS | BODY MASS INDEX: 30.49 KG/M2 | DIASTOLIC BLOOD PRESSURE: 88 MMHG | OXYGEN SATURATION: 96 % | HEIGHT: 65 IN | HEART RATE: 54 BPM

## 2017-08-18 DIAGNOSIS — M54.50 CHRONIC BILATERAL LOW BACK PAIN WITHOUT SCIATICA: Primary | ICD-10-CM

## 2017-08-18 DIAGNOSIS — G89.29 CHRONIC BILATERAL LOW BACK PAIN WITHOUT SCIATICA: Primary | ICD-10-CM

## 2017-08-18 RX ORDER — OXYCODONE HYDROCHLORIDE 5 MG/1
TABLET ORAL
Qty: 40 TAB | Refills: 0 | Status: SHIPPED | OUTPATIENT
Start: 2017-08-18 | End: 2017-09-15 | Stop reason: SDUPTHER

## 2017-08-18 NOTE — PROGRESS NOTES
Interval HPI:     This is a 33 y.o. female who is following up for     PMHx: chronic back pain, hx of fibromyalgia, hx of DVT/ coumadin, hx of Lupus, mild lower lumbar disc degeneration (L4-5 and L5-S1 with small annular tear at L5-S1)    Chief Complaint   Patient presents with    Pain (Chronic)     No changes in lower back pain. Reports that she takes one Oxycodone IR 5 mg tab when she wakes up between 11 AM and 1 PM, then maybe 2-3 days a week will take an extra half tablet. Given lab slip to have Drug Screen (serum/ blood draw as she is ESRD/ Dialysis). Says she took to [facility] but they said because of her insurance ([employer]) they could not do it. Pt normally gets blood work done through PCPs office and says she can take the lab slip there next week to get it checked during her appt. Tried/ failed: Gabapentin, Cymbalta, Amitriptyline (only helped get to sleep), Lyrica (abnormal behaviors)    Reviewed : only filling pain Rx from my office (last fill 7-21-17), no aberrant behavior. Pill count: did not bring bottle, says she forgot. Discussed with her if she does not bring bottle in at next visit (and subsequent visits) will have to stop prescribing her pain medications. UDS Hx:  March 2017: consistent with Rx pain medication      Brief ROS: as above  There have been no significant changes in PMHx, PSHx, SHx except as noted above. Allergies   Allergen Reactions    Compazine [Prochlorperazine Edisylate] Nausea and Vomiting and Palpitations    Fish Containing Products Rash     An itchy rash appeared in about 1 hour     Current Outpatient Prescriptions   Medication Sig Dispense Refill    oxyCODONE IR (ROXICODONE) 5 mg immediate release tablet Take 1 tab in AM and HALF to 1 tab in PM if needed on days where back pain is severe    40 Tab 0    warfarin (COUMADIN) 2.5 mg tablet Take 1 Tab by mouth daily. 30 Tab 5    amLODIPine (NORVASC) 10 mg tablet Take 1 Tab by mouth daily.  27 Tab 5    losartan (COZAAR) 50 mg tablet Take 1 Tab by mouth daily. For blood pressure 30 Tab 5    cloNIDine HCl (CATAPRES) 0.3 mg tablet Take 1 Tab by mouth two (2) times a day. For blood pressure 60 Tab 5    metoprolol succinate (TOPROL-XL) 100 mg tablet Take 1 Tab by mouth daily. For blood pressure 30 Tab 5    furosemide (LASIX) 40 mg tablet Take 1 Tab by mouth daily. For fluid 30 Tab 5    cinacalcet (SENSIPAR) 30 mg tablet Take 30 mg by mouth daily.  AURYXIA 210 mg iron tablet TK 2 TS PO WITH MEALS  12    predniSONE (DELTASONE) 10 mg tablet Take 9 mg by mouth daily (with breakfast).  ergocalciferol (VITAMIN D2) 50,000 unit capsule Take 50,000 Units by mouth every seven (7) days.  hydroxychloroquine (PLAQUENIL) 200 mg tablet Take 200 mg by mouth two (2) times a day.  ondansetron hcl (ZOFRAN) 8 mg tablet Take 1 Tab by mouth every eight (8) hours as needed. For Nausea  Indications: GASTROPARESIS 15 Tab 1    allopurinol (ZYLOPRIM) 100 mg tablet Take 100 mg by mouth daily.  cyanocobalamin (VITAMIN B-12) 2,500 mcg sublingual tablet Take 1 Tab by mouth daily. 90 Tab 1    pantoprazole (PROTONIX) 40 mg tablet Take 1 Tab by mouth Daily (before breakfast). 30 Tab 0    calcitRIOL (ROCALTROL) 0.25 mcg capsule Take 0.25 mcg by mouth daily. 5    senna (SENNA) 8.6 mg tablet Take 1 Tab by mouth daily as needed. Physical Exam  Blood pressure 140/88, pulse (!) 54, height 5' 5\" (1.651 m), weight 83 kg (183 lb), SpO2 96 %.     No acute distress, Awake, alert, oriented  CV: rrr  Pulm: cta    MSK:  Lumbar spine:   + mild tenderness across lower back   + mild pain with back flexion and standing upright  No worsening of pain with back extension or rotation to either side    Focused Neurological Exam     Mental status:   Alert and oriented to person, place situation  Mood appears stable  Normal thought processes    CNs: EOMI, Face symmetric, Hearing/ Language normal  Sensory: intact light touch in both legs  Motor: Normal bulk and strength in all 4 extremities. Reflexes: 2+ patellars  Gait: mildly antalgic    Impression    ICD-10-CM ICD-9-CM    1. Chronic bilateral low back pain without sciatica M54.5 724.2 oxyCODONE IR (ROXICODONE) 5 mg immediate release tablet    G89.29 338.29         Given lab slip to have blood drawn for Drug Screen. D/w patient again that if she does not bring in pain medication bottle to next and future visits, I will stop prescribing her pain medications. Renewed Rx Oxycodone IR 5 mg tablet (#40 tabs) to take 1 tab in AM and occasionally HALF to one tab in evenings if pain is severe. Follow up in 4 weeks.

## 2017-08-18 NOTE — MR AVS SNAPSHOT
Visit Information Date & Time Provider Department Dept. Phone Encounter #  
 8/18/2017 11:20 AM North Castro MD [facility] [place] [facility] [phone] [id number] Follow-up Instructions Return in about 4 weeks (around 9/15/2017). Your Appointments 9/15/2017 10:00 AM  
Follow Up with North Castro MD  
[facility]) Appt Note: follow up pain mgt [place] [address] [facility] [address] [phone]  
  
   
 [place] [address] [id number] [address] Upcoming Health Maintenance Date Due DTaP/Tdap/Td series (1 - Tdap) 2/17/2007 Pneumococcal 19-64 Highest Risk (2 of 3 - PCV13) 10/1/2010 INFLUENZA AGE 9 TO ADULT 8/1/2017 PAP AKA CERVICAL CYTOLOGY 4/13/2019 Allergies as of 8/18/2017  Review Complete On: 8/18/2017 By: Last Burgess LPN Severity Noted Reaction Type Reactions Compazine [Prochlorperazine Edisylate] High 07/11/2016   Systemic Nausea and Vomiting, Palpitations Fish Containing Products High 02/21/2017   Side Effect Rash An itchy rash appeared in about 1 hour Current Immunizations  Reviewed on 9/19/2014 Name Date Influenza Vaccine 9/9/2012 Influenza Vaccine Split 9/1/2011 ZZZ-RETIRED (DO NOT USE) Pneumococcal Vaccine (Unspecified Type) 10/1/2009 Not reviewed this visit You Were Diagnosed With   
  
 Codes Comments Chronic bilateral low back pain without sciatica    -  Primary ICD-10-CM: M54.5, G89.29 ICD-9-CM: 724.2, 338.29 Vitals BP Pulse Height(growth percentile) Weight(growth percentile) SpO2 BMI  
 140/88 (BP 1 Location: Left arm, BP Patient Position: Sitting) (!) 54 5' 5\" (1.651 m) 183 lb (83 kg) 96% 30.45 kg/m2 OB Status Smoking Status Having regular periods Never Smoker BMI and BSA Data Body Mass Index Body Surface Area 30.45 kg/m 2 1.95 m 2 Preferred Pharmacy Pharmacy Name Phone [address], [address] Your Updated Medication List  
  
   
This list is accurate as of: 8/18/17 12:14 PM.  Always use your most recent med list.  
  
  
  
  
 allopurinol 100 mg tablet Commonly known as:  Nancy Roshan Take 100 mg by mouth daily. amLODIPine 10 mg tablet Commonly known as:  Julio Cesar Mirtha Take 1 Tab by mouth daily. AURYXIA 210 mg iron tablet Generic drug:  ferric citrate TK 2 TS PO WITH MEALS  
  
 calcitRIOL 0.25 mcg capsule Commonly known as:  ROCALTROL Take 0.25 mcg by mouth daily. cloNIDine HCl 0.3 mg tablet Commonly known as:  CATAPRES Take 1 Tab by mouth two (2) times a day. For blood pressure  
  
 cyanocobalamin 2,500 mcg sublingual tablet Commonly known as:  VITAMIN B-12 Take 1 Tab by mouth daily. furosemide 40 mg tablet Commonly known as:  LASIX Take 1 Tab by mouth daily. For fluid  
  
 losartan 50 mg tablet Commonly known as:  COZAAR Take 1 Tab by mouth daily. For blood pressure  
  
 metoprolol succinate 100 mg tablet Commonly known as:  TOPROL-XL Take 1 Tab by mouth daily. For blood pressure  
  
 ondansetron hcl 8 mg tablet Commonly known as:  Marisa Lorenzana Take 1 Tab by mouth every eight (8) hours as needed. For Nausea  Indications: GASTROPARESIS  
  
 oxyCODONE IR 5 mg immediate release tablet Commonly known as:  Rock Essex Take 1 tab in AM and HALF to 1 tab in PM if needed on days where back pain is severe  
  
 pantoprazole 40 mg tablet Commonly known as:  PROTONIX Take 1 Tab by mouth Daily (before breakfast). PLAQUENIL 200 mg tablet Generic drug:  hydroxychloroquine Take 200 mg by mouth two (2) times a day. predniSONE 10 mg tablet Commonly known as:  Graydon Atco Take 9 mg by mouth daily (with breakfast). Senna 8.6 mg tablet Generic drug:  senna Take 1 Tab by mouth daily as needed. SENSIPAR 30 mg tablet Generic drug:  cinacalcet Take 30 mg by mouth daily. VITAMIN D2 50,000 unit capsule Generic drug:  ergocalciferol Take 50,000 Units by mouth every seven (7) days. warfarin 2.5 mg tablet Commonly known as:  COUMADIN Take 1 Tab by mouth daily. Prescriptions Printed Refills  
 oxyCODONE IR (ROXICODONE) 5 mg immediate release tablet 0 Sig: Take 1 tab in AM and HALF to 1 tab in PM if needed on days where back pain is severe 
   
 Class: Print Follow-up Instructions Return in about 4 weeks (around 9/15/2017). Introducing [facility] & HEALTH SERVICES! [facility] introduces Opternative patient portal. Now you can access parts of your medical record, email your doctor's office, and request medication refills online. 1. In your internet browser, go to [email or web address] 2. Click on the First Time User? Click Here link in the Sign In box. You will see the New Member Sign Up page. 3. Enter your Opternative Access Code exactly as it appears below. You will not need to use this code after youve completed the sign-up process. If you do not sign up before the expiration date, you must request a new code. · Opternative Access Code: [email or web address] Expires: 11/16/2017 11:22 AM 
 
4. Enter the last four digits of your Social Security Number (xxxx) and Date of Birth (mm/dd/yyyy) as indicated and click Submit. You will be taken to the next sign-up page. 5. Create a Rewalk Roboticst ID. This will be your Opternative login ID and cannot be changed, so think of one that is secure and easy to remember. 6. Create a [employer] password. You can change your password at any time. 7. Enter your Password Reset Question and Answer. This can be used at a later time if you forget your password. 8. Enter your e-mail address.  You will receive e-mail notification when new information is available in [employer]. 9. Click Sign Up. You can now view and download portions of your medical record. 10. Click the Download Summary menu link to download a portable copy of your medical information. If you have questions, please visit the Frequently Asked Questions section of the [employer] website. Remember, [employer] is NOT to be used for urgent needs. For medical emergencies, dial 911. Now available from your iPhone and Android! Please provide this summary of care documentation to your next provider. Your primary care clinician is listed as Mellissa Hernández. If you have any questions after today's visit, please call [phone].

## 2017-08-18 NOTE — TELEPHONE ENCOUNTER
Attempted to contact [facility] by phone. No answer, left call back number on voice mail.

## 2017-09-13 DIAGNOSIS — G89.4 CHRONIC PAIN SYNDROME: Primary | ICD-10-CM

## 2017-09-13 DIAGNOSIS — Z51.89 ENCOUNTER FOR MONITORING OF PATIENT COMPLIANCE IN DRUG TREATMENT PROGRAM: ICD-10-CM

## 2017-09-15 ENCOUNTER — OFFICE VISIT (OUTPATIENT)
Dept: NEUROLOGY | Age: 32
End: 2017-09-15

## 2017-09-15 VITALS
SYSTOLIC BLOOD PRESSURE: 160 MMHG | OXYGEN SATURATION: 94 % | DIASTOLIC BLOOD PRESSURE: 100 MMHG | WEIGHT: 183 LBS | BODY MASS INDEX: 30.49 KG/M2 | HEART RATE: 108 BPM | HEIGHT: 65 IN

## 2017-09-15 DIAGNOSIS — M54.50 CHRONIC BILATERAL LOW BACK PAIN WITHOUT SCIATICA: Primary | ICD-10-CM

## 2017-09-15 DIAGNOSIS — G89.29 CHRONIC BILATERAL LOW BACK PAIN WITHOUT SCIATICA: Primary | ICD-10-CM

## 2017-09-15 RX ORDER — OXYCODONE HYDROCHLORIDE 5 MG/1
TABLET ORAL
Qty: 40 TAB | Refills: 0 | Status: SHIPPED | OUTPATIENT
Start: 2017-09-15 | End: 2017-10-13 | Stop reason: SDUPTHER

## 2017-09-15 NOTE — PROGRESS NOTES
Pill count =   2 oxycodone 5 mg tabs     Pill count verified with patient. UDS obtained and sent =  Pain contract = on file    made available for MD review.

## 2017-09-15 NOTE — MR AVS SNAPSHOT
Visit Information Date & Time Provider Department Dept. Phone Encounter #  
 9/15/2017 10:00 AM North Castro MD [facility] [place] [facility] [phone] [id number] Follow-up Instructions Return in about 4 weeks (around 10/13/2017). Upcoming Health Maintenance Date Due DTaP/Tdap/Td series (1 - Tdap) 2/17/2007 Pneumococcal 19-64 Highest Risk (2 of 3 - PCV13) 10/1/2010 INFLUENZA AGE 9 TO ADULT 8/1/2017 PAP AKA CERVICAL CYTOLOGY 4/13/2019 Allergies as of 9/15/2017  Review Complete On: 9/15/2017 By: Last Burgess LPN Severity Noted Reaction Type Reactions Compazine [Prochlorperazine Edisylate] High 07/11/2016   Systemic Nausea and Vomiting, Palpitations Fish Containing Products High 02/21/2017   Side Effect Rash An itchy rash appeared in about 1 hour Current Immunizations  Reviewed on 9/19/2014 Name Date Influenza Vaccine 9/9/2012 Influenza Vaccine Split 9/1/2011 ZZZ-RETIRED (DO NOT USE) Pneumococcal Vaccine (Unspecified Type) 10/1/2009 Not reviewed this visit You Were Diagnosed With   
  
 Codes Comments Chronic bilateral low back pain without sciatica    -  Primary ICD-10-CM: M54.5, G89.29 ICD-9-CM: 724.2, 338.29 Vitals BP Pulse Height(growth percentile) Weight(growth percentile) SpO2 BMI  
 (!) 160/100 (BP 1 Location: Left arm, BP Patient Position: Sitting) (!) 108 5' 5\" (1.651 m) 183 lb (83 kg) 94% 30.45 kg/m2 OB Status Smoking Status Having regular periods Never Smoker BMI and BSA Data Body Mass Index Body Surface Area  
 30.45 kg/m 2 1.95 m 2 Preferred Pharmacy Pharmacy Name Phone [address], [facility] [address] Your Updated Medication List  
  
   
This list is accurate as of: 9/15/17 10:23 AM.  Always use your most recent med list.  
  
  
  
  
 allopurinol 100 mg tablet Commonly known as:  Shellie Miller Take 100 mg by mouth daily. amLODIPine 10 mg tablet Commonly known as:  Montserratarley Michel Take 1 Tab by mouth daily. AURYXIA 210 mg iron tablet Generic drug:  ferric citrate TK 2 TS PO WITH MEALS  
  
 calcitRIOL 0.25 mcg capsule Commonly known as:  ROCALTROL Take 0.25 mcg by mouth daily. cloNIDine HCl 0.3 mg tablet Commonly known as:  CATAPRES Take 1 Tab by mouth two (2) times a day. For blood pressure  
  
 cyanocobalamin 2,500 mcg sublingual tablet Commonly known as:  VITAMIN B-12 Take 1 Tab by mouth daily. furosemide 40 mg tablet Commonly known as:  LASIX Take 1 Tab by mouth daily. For fluid  
  
 losartan 50 mg tablet Commonly known as:  COZAAR Take 1 Tab by mouth daily. For blood pressure  
  
 metoprolol succinate 100 mg tablet Commonly known as:  TOPROL-XL Take 1 Tab by mouth daily. For blood pressure  
  
 ondansetron hcl 8 mg tablet Commonly known as:  Norina Buda Take 1 Tab by mouth every eight (8) hours as needed. For Nausea  Indications: GASTROPARESIS  
  
 oxyCODONE IR 5 mg immediate release tablet Commonly known as:  Ronald Laser Take 1 tab in AM and HALF to 1 tab in PM if needed on days where back pain is severe  
  
 pantoprazole 40 mg tablet Commonly known as:  PROTONIX Take 1 Tab by mouth Daily (before breakfast). PLAQUENIL 200 mg tablet Generic drug:  hydroxychloroquine Take 200 mg by mouth two (2) times a day. predniSONE 10 mg tablet Commonly known as:  Dalbert Rivera Take 9 mg by mouth daily (with breakfast). Senna 8.6 mg tablet Generic drug:  senna Take 1 Tab by mouth daily as needed. SENSIPAR 30 mg tablet Generic drug:  cinacalcet Take 30 mg by mouth daily. VITAMIN D2 50,000 unit capsule Generic drug:  ergocalciferol Take 50,000 Units by mouth every seven (7) days. warfarin 2.5 mg tablet Commonly known as:  COUMADIN  
 Take 1 Tab by mouth daily. Prescriptions Printed Refills  
 oxyCODONE IR (ROXICODONE) 5 mg immediate release tablet 0 Sig: Take 1 tab in AM and HALF to 1 tab in PM if needed on days where back pain is severe 
   
 Class: Print Follow-up Instructions Return in about 4 weeks (around 10/13/2017). Introducing [facility] SERVICES! Filiberto Hector introduces [email or web address] patient portal. Now you can access parts of your medical record, email your doctor's office, and request medication refills online. 1. In your internet browser, go to [email or web address] 2. Click on the First Time User? Click Here link in the Sign In box. You will see the New Member Sign Up page. 3. Enter your [email or web address] Access Code exactly as it appears below. You will not need to use this code after youve completed the sign-up process. If you do not sign up before the expiration date, you must request a new code. · [email or web address] Access Code: [email or web address] Expires: 11/16/2017 11:22 AM 
 
4. Enter the last four digits of your Social Security Number (xxxx) and Date of Birth (mm/dd/yyyy) as indicated and click Submit. You will be taken to the next sign-up page. 5. Create a [email or web address] ID. This will be your [email or web address] login ID and cannot be changed, so think of one that is secure and easy to remember. 6. Create a [email or web address] password. You can change your password at any time. 7. Enter your Password Reset Question and Answer. This can be used at a later time if you forget your password. 8. Enter your e-mail address. You will receive e-mail notification when new information is available in [address] 9. Click Sign Up. You can now view and download portions of your medical record. 10. Click the Download Summary menu link to download a portable copy of your medical information.  
 
If you have questions, please visit the Frequently Asked Questions section of the edo. Remember, [employer] is NOT to be used for urgent needs. For medical emergencies, dial 911. Now available from your iPhone and Android! Please provide this summary of care documentation to your next provider. Your primary care clinician is listed as Kaylyn Skaggs. If you have any questions after today's visit, please call [phone].

## 2017-09-15 NOTE — PROGRESS NOTES
Interval HPI:     This is a 33 y.o. female who is following up for     PMHx: chronic back pain, hx of fibromyalgia, hx of DVT/ coumadin, hx of Lupus, mild lower lumbar disc degeneration (L4-5 and L5-S1 with small annular tear at L5-S1)    Chief Complaint   Patient presents with    Pain (Chronic)     No changes in lower back pain. Reports that she takes one Oxycodone IR 5 mg tab when she wakes up between 11 AM and 1 PM, then maybe 2-3 days a week will take an extra tablet. Tried/ failed: Gabapentin, Cymbalta, Amitriptyline (only helped get to sleep), Lyrica (abnormal behaviors)    Reviewed : only filling pain Rx from my office (last fill 7-21-17), no aberrant behavior. Pill count: 2 Oxycodone IR 5 mg tablets left    UDS Hx:  March 2017: consistent with Rx pain medication  9-13-17 UDS: results pending      Brief ROS: as above  There have been no significant changes in PMHx, PSHx, SHx except as noted above. Allergies   Allergen Reactions    Compazine [Prochlorperazine Edisylate] Nausea and Vomiting and Palpitations    Fish Containing Products Rash     An itchy rash appeared in about 1 hour     Current Outpatient Prescriptions   Medication Sig Dispense Refill    oxyCODONE IR (ROXICODONE) 5 mg immediate release tablet Take 1 tab in AM and HALF to 1 tab in PM if needed on days where back pain is severe    40 Tab 0    warfarin (COUMADIN) 2.5 mg tablet Take 1 Tab by mouth daily. 30 Tab 5    amLODIPine (NORVASC) 10 mg tablet Take 1 Tab by mouth daily. 30 Tab 5    losartan (COZAAR) 50 mg tablet Take 1 Tab by mouth daily. For blood pressure 30 Tab 5    cloNIDine HCl (CATAPRES) 0.3 mg tablet Take 1 Tab by mouth two (2) times a day. For blood pressure 60 Tab 5    metoprolol succinate (TOPROL-XL) 100 mg tablet Take 1 Tab by mouth daily. For blood pressure 30 Tab 5    furosemide (LASIX) 40 mg tablet Take 1 Tab by mouth daily.  For fluid 30 Tab 5    cinacalcet (SENSIPAR) 30 mg tablet Take 30 mg by mouth daily.  AURYXIA 210 mg iron tablet TK 2 TS PO WITH MEALS  12    predniSONE (DELTASONE) 10 mg tablet Take 9 mg by mouth daily (with breakfast).  ergocalciferol (VITAMIN D2) 50,000 unit capsule Take 50,000 Units by mouth every seven (7) days.  hydroxychloroquine (PLAQUENIL) 200 mg tablet Take 200 mg by mouth two (2) times a day.  ondansetron hcl (ZOFRAN) 8 mg tablet Take 1 Tab by mouth every eight (8) hours as needed. For Nausea  Indications: GASTROPARESIS 15 Tab 1    allopurinol (ZYLOPRIM) 100 mg tablet Take 100 mg by mouth daily.  cyanocobalamin (VITAMIN B-12) 2,500 mcg sublingual tablet Take 1 Tab by mouth daily. 90 Tab 1    pantoprazole (PROTONIX) 40 mg tablet Take 1 Tab by mouth Daily (before breakfast). 30 Tab 0    calcitRIOL (ROCALTROL) 0.25 mcg capsule Take 0.25 mcg by mouth daily. 5    senna (SENNA) 8.6 mg tablet Take 1 Tab by mouth daily as needed. Physical Exam  Blood pressure (!) 160/100, pulse (!) 108, height 5' 5\" (1.651 m), weight 83 kg (183 lb), SpO2 94 %. No acute distress, Awake, alert, oriented  CV: tachycardia  Pulm: cta    MSK:  Lumbar spine:   + mild tenderness across lower back   + mild pain with back flexion and standing upright  Mild worsening with back extension and rotation to left not right    Focused Neurological Exam     Mental status:   Alert and oriented to person, place situation  Mood appears stable  Normal thought processes    CNs: EOMI, Face symmetric, Hearing/ Language normal  Sensory: intact light touch in both legs  Motor: Normal bulk and strength in all 4 extremities. Reflexes: 2+ patellars  Gait: mildly antalgic    Impression    ICD-10-CM ICD-9-CM    1. Chronic bilateral low back pain without sciatica M54.5 724.2 oxyCODONE IR (ROXICODONE) 5 mg immediate release tablet    G89.29 338.29         Renewed Rx Oxycodone IR 5 mg tablet (#40 tabs) to take 1 tab in AM and occasionally HALF to one tab in evenings if pain is severe. Follow up in 4 weeks.

## 2017-09-18 LAB
AMPHETAMINES UR QL SCN: NEGATIVE NG/ML
BARBITURATES UR QL SCN: NEGATIVE NG/ML
BENZODIAZ UR QL: NEGATIVE NG/ML
BZE UR QL: NEGATIVE NG/ML
CANNABINOIDS UR QL SCN: NEGATIVE NG/ML
MDMA UR QL SCN: NEGATIVE NG/ML
METHADONE UR QL SCN: NEGATIVE NG/ML
METHAQUALONE UR QL: NEGATIVE NG/ML
OPIATES UR QL: NEGATIVE
PCP UR QL: NEGATIVE NG/ML
PROPOXYPH UR QL: NEGATIVE NG/ML

## 2017-10-13 ENCOUNTER — OFFICE VISIT (OUTPATIENT)
Dept: NEUROLOGY | Age: 32
End: 2017-10-13

## 2017-10-13 VITALS
WEIGHT: 156 LBS | HEIGHT: 65 IN | HEART RATE: 87 BPM | DIASTOLIC BLOOD PRESSURE: 84 MMHG | OXYGEN SATURATION: 97 % | BODY MASS INDEX: 25.99 KG/M2 | SYSTOLIC BLOOD PRESSURE: 132 MMHG

## 2017-10-13 DIAGNOSIS — R63.4 UNINTENTIONAL WEIGHT LOSS: ICD-10-CM

## 2017-10-13 DIAGNOSIS — M54.50 CHRONIC BILATERAL LOW BACK PAIN WITHOUT SCIATICA: Primary | ICD-10-CM

## 2017-10-13 DIAGNOSIS — R11.2 NAUSEA AND VOMITING, INTRACTABILITY OF VOMITING NOT SPECIFIED, UNSPECIFIED VOMITING TYPE: ICD-10-CM

## 2017-10-13 DIAGNOSIS — G89.29 CHRONIC BILATERAL LOW BACK PAIN WITHOUT SCIATICA: Primary | ICD-10-CM

## 2017-10-13 RX ORDER — OXYCODONE HYDROCHLORIDE 5 MG/1
TABLET ORAL
Qty: 40 TAB | Refills: 0 | Status: SHIPPED | OUTPATIENT
Start: 2017-10-13 | End: 2017-11-10 | Stop reason: SDUPTHER

## 2017-10-13 NOTE — PROGRESS NOTES
Interval HPI:     This is a 33 y.o. female who is following up for     PMHx: chronic back pain, hx of fibromyalgia, hx of DVT/ coumadin, hx of Lupus, mild lower lumbar disc degeneration (L4-5 and L5-S1 with small annular tear at L5-S1)    Chief Complaint   Patient presents with    Pain (Chronic)       Pt had unintentional weight loss over past 1 month, says unable to keep anything down, vomiting. Going to see her Dialysis nurse today who pt says generally helps her with other medical issues. Pt last saw PCP in March. No changes in lower back pain, but having more generalized body aching over past week or two (does have fibromyalgia). Reports that she takes one Oxycodone IR 5 mg tab when she wakes up and maybe 2-3 days a week will take an extra tablet. Tried/ failed: Gabapentin, Cymbalta, Amitriptyline (only helped get to sleep), Lyrica (abnormal behaviors)    Reviewed : only filling pain Rx from my office, no aberrant behavior. Pill count: 2 Oxycodone IR 5 mg tablets left  UDS Hx:  March 2017: consistent with Rx pain medication  9-13-17 UDS: only screened for codeine or morphine, which pt doesn't take      Brief ROS: as above  There have been no significant changes in PMHx, PSHx, SHx except as noted above. Allergies   Allergen Reactions    Compazine [Prochlorperazine Edisylate] Nausea and Vomiting and Palpitations    Fish Containing Products Rash     An itchy rash appeared in about 1 hour     Current Outpatient Prescriptions   Medication Sig Dispense Refill    oxyCODONE IR (ROXICODONE) 5 mg immediate release tablet Take 1 tab in AM and HALF to 1 tab in PM if needed on days where back pain is severe    40 Tab 0    warfarin (COUMADIN) 2.5 mg tablet Take 1 Tab by mouth daily. 30 Tab 5    amLODIPine (NORVASC) 10 mg tablet Take 1 Tab by mouth daily. 30 Tab 5    losartan (COZAAR) 50 mg tablet Take 1 Tab by mouth daily.  For blood pressure 30 Tab 5    cloNIDine HCl (CATAPRES) 0.3 mg tablet Take 1 Tab by mouth two (2) times a day. For blood pressure 60 Tab 5    metoprolol succinate (TOPROL-XL) 100 mg tablet Take 1 Tab by mouth daily. For blood pressure 30 Tab 5    furosemide (LASIX) 40 mg tablet Take 1 Tab by mouth daily. For fluid 30 Tab 5    cinacalcet (SENSIPAR) 30 mg tablet Take 30 mg by mouth daily.  AURYXIA 210 mg iron tablet TK 2 TS PO WITH MEALS  12    predniSONE (DELTASONE) 10 mg tablet Take 9 mg by mouth daily (with breakfast).  ergocalciferol (VITAMIN D2) 50,000 unit capsule Take 50,000 Units by mouth every seven (7) days.  hydroxychloroquine (PLAQUENIL) 200 mg tablet Take 200 mg by mouth two (2) times a day.  ondansetron hcl (ZOFRAN) 8 mg tablet Take 1 Tab by mouth every eight (8) hours as needed. For Nausea  Indications: GASTROPARESIS 15 Tab 1    allopurinol (ZYLOPRIM) 100 mg tablet Take 100 mg by mouth daily.  cyanocobalamin (VITAMIN B-12) 2,500 mcg sublingual tablet Take 1 Tab by mouth daily. 90 Tab 1    pantoprazole (PROTONIX) 40 mg tablet Take 1 Tab by mouth Daily (before breakfast). 30 Tab 0    calcitRIOL (ROCALTROL) 0.25 mcg capsule Take 0.25 mcg by mouth daily. 5    senna (SENNA) 8.6 mg tablet Take 1 Tab by mouth daily as needed. Physical Exam  Blood pressure 132/84, pulse 87, height 5' 5\" (1.651 m), weight 70.8 kg (156 lb), SpO2 97 %. Resting, appears much thinner than prior visits, appears mildly ill though not in distress  Awake, alert and conversant     MSK:  Lumbar spine:   Mild tenderness across lower back   No pain with back flexion or standing upright  Mild worsening with back extension and rotation to left not right    Focused Neurological Exam     Mental status:   Alert and oriented to person, place situation  Mood appears stable  Normal thought processes    CNs: EOMI, Face symmetric, Hearing/ Language normal  Sensory: intact light touch in both legs  Motor: Normal bulk and strength in all 4 extremities.     Reflexes: 1+ patellars  Gait: mildly antalgic    Impression    ICD-10-CM ICD-9-CM    1. Chronic bilateral low back pain without sciatica M54.5 724.2     G89.29 338.29    2. Unintentional weight loss R63.4 783.21    3. Nausea and vomiting, intractability of vomiting not specified, unspecified vomiting type R11.2 787.01         Renewed Rx Oxycodone IR 5 mg tablet (#40 tabs) to take 1 tab in AM and occasionally HALF to one tab in evenings if pain is severe. Check UDS at next visit (if pt can make urine, if not check serum drug test). Pt to discuss nausea, vomiting, weight loss with Dialysis Nurse today. If no clear cause, advised pt to see PCP. Follow up in 4 weeks.

## 2017-10-13 NOTE — MR AVS SNAPSHOT
Visit Information Date & Time Provider Department Dept. Phone Encounter #  
 10/13/2017 10:20 AM Sabas Clark MD [facility] Neurology [facility] [phone] [id number] Follow-up Instructions Return in about 4 weeks (around 11/10/2017). Your Appointments 11/10/2017 10:20 AM  
Follow Up with Sabas Clark MD  
[facility]) Appt Note: follow up pain [facility] [address] [address] [phone]  
  
   
 [address] [place] [address] [address] Upcoming Health Maintenance Date Due DTaP/Tdap/Td series (1 - Tdap) 2/17/2007 Pneumococcal 19-64 Highest Risk (2 of 3 - PCV13) 10/1/2010 INFLUENZA AGE 9 TO ADULT 8/1/2017 PAP AKA CERVICAL CYTOLOGY 4/13/2019 Allergies as of 10/13/2017  Review Complete On: 10/13/2017 By: Suresh Huang LPN Severity Noted Reaction Type Reactions Compazine [Prochlorperazine Edisylate] High 07/11/2016   Systemic Nausea and Vomiting, Palpitations Fish Containing Products High 02/21/2017   Side Effect Rash An itchy rash appeared in about 1 hour Current Immunizations  Reviewed on 9/19/2014 Name Date Influenza Vaccine 9/9/2012 Influenza Vaccine Split 9/1/2011 ZZZ-RETIRED (DO NOT USE) Pneumococcal Vaccine (Unspecified Type) 10/1/2009 Not reviewed this visit You Were Diagnosed With   
  
 Codes Comments Chronic bilateral low back pain without sciatica    -  Primary ICD-10-CM: M54.5, G89.29 ICD-9-CM: 724.2, 338.29 Unintentional weight loss     ICD-10-CM: R63.4 ICD-9-CM: 783.21 Nausea and vomiting, intractability of vomiting not specified, unspecified vomiting type     ICD-10-CM: R11.2 ICD-9-CM: 787.01 Vitals BP Pulse Height(growth percentile) Weight(growth percentile) SpO2 BMI 132/84 (BP 1 Location: Left arm, BP Patient Position: Sitting) 87 5' 5\" (1.651 m) 156 lb (70.8 kg) 97% 25.96 kg/m2 OB Status Smoking Status Having regular periods Never Smoker Vitals History BMI and BSA Data Body Mass Index Body Surface Area  
 25.96 kg/m 2 1.8 m 2 Preferred Pharmacy Pharmacy Name Phone [address], [address] Your Updated Medication List  
  
   
This list is accurate as of: 10/13/17 11:13 AM.  Always use your most recent med list.  
  
  
  
  
 allopurinol 100 mg tablet Commonly known as:  Gonzella Oksanaa Take 100 mg by mouth daily. amLODIPine 10 mg tablet Commonly known as:  Tad Sosa Take 1 Tab by mouth daily. AURYXIA 210 mg iron tablet Generic drug:  ferric citrate TK 2 TS PO WITH MEALS  
  
 calcitRIOL 0.25 mcg capsule Commonly known as:  ROCALTROL Take 0.25 mcg by mouth daily. cloNIDine HCl 0.3 mg tablet Commonly known as:  CATAPRES Take 1 Tab by mouth two (2) times a day. For blood pressure  
  
 cyanocobalamin 2,500 mcg sublingual tablet Commonly known as:  VITAMIN B-12 Take 1 Tab by mouth daily. furosemide 40 mg tablet Commonly known as:  LASIX Take 1 Tab by mouth daily. For fluid  
  
 losartan 50 mg tablet Commonly known as:  COZAAR Take 1 Tab by mouth daily. For blood pressure  
  
 metoprolol succinate 100 mg tablet Commonly known as:  TOPROL-XL Take 1 Tab by mouth daily. For blood pressure  
  
 ondansetron hcl 8 mg tablet Commonly known as:  Maribell Actis Take 1 Tab by mouth every eight (8) hours as needed. For Nausea  Indications: GASTROPARESIS  
  
 oxyCODONE IR 5 mg immediate release tablet Commonly known as:  [place] Rice Take 1 tab in AM and HALF to 1 tab in PM if needed on days where back pain is severe  
  
 pantoprazole 40 mg tablet Commonly known as:  PROTONIX Take 1 Tab by mouth Daily (before breakfast). PLAQUENIL 200 mg tablet Generic drug:  hydroxychloroquine Take 200 mg by mouth two (2) times a day. predniSONE 10 mg tablet Commonly known as:  Sharmila Pound Take 9 mg by mouth daily (with breakfast). Senna 8.6 mg tablet Generic drug:  senna Take 1 Tab by mouth daily as needed. SENSIPAR 30 mg tablet Generic drug:  cinacalcet Take 30 mg by mouth daily. VITAMIN D2 50,000 unit capsule Generic drug:  ergocalciferol Take 50,000 Units by mouth every seven (7) days. warfarin 2.5 mg tablet Commonly known as:  COUMADIN Take 1 Tab by mouth daily. Prescriptions Printed Refills  
 oxyCODONE IR (ROXICODONE) 5 mg immediate release tablet 0 Sig: Take 1 tab in AM and HALF to 1 tab in PM if needed on days where back pain is severe 
   
 Class: Print Follow-up Instructions Return in about 4 weeks (around 11/10/2017). Introducing [facility]! Lucretia Sewell introduces [employer] patient portal. Now you can access parts of your medical record, email your doctor's office, and request medication refills online. 1. In your internet browser, go to [email or web address] 2. Click on the First Time User? Click Here link in the Sign In box. You will see the New Member Sign Up page. 3. Enter your [employer] Access Code exactly as it appears below. You will not need to use this code after youve completed the sign-up process. If you do not sign up before the expiration date, you must request a new code. · [employer] Access Code: [email or web address] Expires: 11/16/2017 11:22 AM 
 
4. Enter the last four digits of your Social Security Number (xxxx) and Date of Birth (mm/dd/yyyy) as indicated and click Submit. You will be taken to the next sign-up page. 5. Create a [employer] ID. This will be your [facility] login ID and cannot be changed, so think of one that is secure and easy to remember. 6. Create a [employer] password. You can change your password at any time. 7. Enter your Password Reset Question and Answer. This can be used at a later time if you forget your password. 8. Enter your e-mail address. You will receive e-mail notification when new information is available in [address] 9. Click Sign Up. You can now view and download portions of your medical record. 10. Click the Download Summary menu link to download a portable copy of your medical information. If you have questions, please visit the Frequently Asked Questions section of the [employer] website. Remember, [employer] is NOT to be used for urgent needs. For medical emergencies, dial 911. Now available from your iPhone and Android! Please provide this summary of care documentation to your next provider. Your primary care clinician is listed as Kristin Reddy. If you have any questions after today's visit, please call [phone].

## 2017-10-14 ENCOUNTER — HOSPITAL ENCOUNTER (INPATIENT)
Age: 32
LOS: 3 days | Discharge: HOME | DRG: 193 | End: 2017-10-17
Attending: EMERGENCY MEDICINE | Admitting: INTERNAL MEDICINE
Payer: MEDICARE

## 2017-10-14 ENCOUNTER — APPOINTMENT (OUTPATIENT)
Dept: GENERAL RADIOLOGY | Age: 32
DRG: 193 | End: 2017-10-14
Attending: STUDENT IN AN ORGANIZED HEALTH CARE EDUCATION/TRAINING PROGRAM
Payer: MEDICARE

## 2017-10-14 DIAGNOSIS — J18.9 COMMUNITY ACQUIRED PNEUMONIA OF LEFT LOWER LOBE OF LUNG: Primary | ICD-10-CM

## 2017-10-14 PROBLEM — J90 PLEURAL EFFUSION, LEFT: Status: ACTIVE | Noted: 2017-10-14

## 2017-10-14 PROBLEM — I10 HYPERTENSION: Chronic | Status: ACTIVE | Noted: 2017-10-14

## 2017-10-14 LAB
ALBUMIN SERPL-MCNC: 1.7 G/DL (ref 3.5–5)
ALBUMIN/GLOB SERPL: 0.3 {RATIO} (ref 1.1–2.2)
ALP SERPL-CCNC: 58 U/L (ref 45–117)
ALT SERPL-CCNC: 20 U/L (ref 12–78)
ANION GAP SERPL CALC-SCNC: 9 MMOL/L (ref 5–15)
AST SERPL-CCNC: 39 U/L (ref 15–37)
BASOPHILS # BLD: 0 K/UL (ref 0–0.1)
BASOPHILS NFR BLD: 0 % (ref 0–1)
BILIRUB SERPL-MCNC: 0.4 MG/DL (ref 0.2–1)
BUN SERPL-MCNC: 28 MG/DL (ref 6–20)
BUN/CREAT SERPL: 3 (ref 12–20)
CALCIUM SERPL-MCNC: 8.6 MG/DL (ref 8.5–10.1)
CHLORIDE SERPL-SCNC: 95 MMOL/L (ref 97–108)
CO2 SERPL-SCNC: 30 MMOL/L (ref 21–32)
CREAT SERPL-MCNC: 9.68 MG/DL (ref 0.55–1.02)
EOSINOPHIL # BLD: 0 K/UL (ref 0–0.4)
EOSINOPHIL NFR BLD: 0 % (ref 0–7)
ERYTHROCYTE [DISTWIDTH] IN BLOOD BY AUTOMATED COUNT: 15.7 % (ref 11.5–14.5)
FLUAV AG NPH QL IA: NEGATIVE
FLUBV AG NOSE QL IA: NEGATIVE
GLOBULIN SER CALC-MCNC: 5.3 G/DL (ref 2–4)
GLUCOSE SERPL-MCNC: 94 MG/DL (ref 65–100)
HCT VFR BLD AUTO: 34 % (ref 35–47)
HGB BLD-MCNC: 11.1 G/DL (ref 11.5–16)
INR PPP: 1.2 (ref 0.9–1.1)
LACTATE SERPL-SCNC: 1.9 MMOL/L (ref 0.4–2)
LYMPHOCYTES # BLD: 1.9 K/UL (ref 0.8–3.5)
LYMPHOCYTES NFR BLD: 45 % (ref 12–49)
MAGNESIUM SERPL-MCNC: 1.4 MG/DL (ref 1.6–2.4)
MCH RBC QN AUTO: 29.8 PG (ref 26–34)
MCHC RBC AUTO-ENTMCNC: 32.6 G/DL (ref 30–36.5)
MCV RBC AUTO: 91.4 FL (ref 80–99)
MONOCYTES # BLD: 0.4 K/UL (ref 0–1)
MONOCYTES NFR BLD: 10 % (ref 5–13)
NEUTS SEG # BLD: 1.9 K/UL (ref 1.8–8)
NEUTS SEG NFR BLD: 45 % (ref 32–75)
PHOSPHATE SERPL-MCNC: 4.4 MG/DL (ref 2.6–4.7)
PLATELET # BLD AUTO: 244 K/UL (ref 150–400)
POTASSIUM SERPL-SCNC: 2.2 MMOL/L (ref 3.5–5.1)
POTASSIUM SERPL-SCNC: 2.6 MMOL/L (ref 3.5–5.1)
PROT SERPL-MCNC: 7 G/DL (ref 6.4–8.2)
PROTHROMBIN TIME: 11.9 SEC (ref 9–11.1)
RBC # BLD AUTO: 3.72 M/UL (ref 3.8–5.2)
SODIUM SERPL-SCNC: 134 MMOL/L (ref 136–145)
WBC # BLD AUTO: 4.2 K/UL (ref 3.6–11)

## 2017-10-14 PROCEDURE — 84132 ASSAY OF SERUM POTASSIUM: CPT | Performed by: FAMILY MEDICINE

## 2017-10-14 PROCEDURE — 74011250637 HC RX REV CODE- 250/637: Performed by: INTERNAL MEDICINE

## 2017-10-14 PROCEDURE — 74011250636 HC RX REV CODE- 250/636: Performed by: INTERNAL MEDICINE

## 2017-10-14 PROCEDURE — 65270000032 HC RM SEMIPRIVATE

## 2017-10-14 PROCEDURE — 3E1M39Z IRRIGATION OF PERITONEAL CAVITY USING DIALYSATE, PERCUTANEOUS APPROACH: ICD-10-PCS | Performed by: INTERNAL MEDICINE

## 2017-10-14 PROCEDURE — 83735 ASSAY OF MAGNESIUM: CPT | Performed by: INTERNAL MEDICINE

## 2017-10-14 PROCEDURE — 83605 ASSAY OF LACTIC ACID: CPT | Performed by: EMERGENCY MEDICINE

## 2017-10-14 PROCEDURE — 36415 COLL VENOUS BLD VENIPUNCTURE: CPT | Performed by: FAMILY MEDICINE

## 2017-10-14 PROCEDURE — 74011250636 HC RX REV CODE- 250/636: Performed by: EMERGENCY MEDICINE

## 2017-10-14 PROCEDURE — 71020 XR CHEST PA LAT: CPT

## 2017-10-14 PROCEDURE — 87804 INFLUENZA ASSAY W/OPTIC: CPT | Performed by: EMERGENCY MEDICINE

## 2017-10-14 PROCEDURE — 90945 DIALYSIS ONE EVALUATION: CPT

## 2017-10-14 PROCEDURE — 87040 BLOOD CULTURE FOR BACTERIA: CPT | Performed by: EMERGENCY MEDICINE

## 2017-10-14 PROCEDURE — 96374 THER/PROPH/DIAG INJ IV PUSH: CPT

## 2017-10-14 PROCEDURE — 85025 COMPLETE CBC W/AUTO DIFF WBC: CPT | Performed by: EMERGENCY MEDICINE

## 2017-10-14 PROCEDURE — 85610 PROTHROMBIN TIME: CPT | Performed by: INTERNAL MEDICINE

## 2017-10-14 PROCEDURE — 74011636637 HC RX REV CODE- 636/637: Performed by: INTERNAL MEDICINE

## 2017-10-14 PROCEDURE — 80053 COMPREHEN METABOLIC PANEL: CPT | Performed by: EMERGENCY MEDICINE

## 2017-10-14 PROCEDURE — 84100 ASSAY OF PHOSPHORUS: CPT | Performed by: INTERNAL MEDICINE

## 2017-10-14 PROCEDURE — 99283 EMERGENCY DEPT VISIT LOW MDM: CPT

## 2017-10-14 RX ORDER — WARFARIN 2 MG/1
4 TABLET ORAL
Status: COMPLETED | OUTPATIENT
Start: 2017-10-14 | End: 2017-10-14

## 2017-10-14 RX ORDER — LOSARTAN POTASSIUM 50 MG/1
50 TABLET ORAL DAILY
Status: DISCONTINUED | OUTPATIENT
Start: 2017-10-15 | End: 2017-10-17 | Stop reason: HOSPADM

## 2017-10-14 RX ORDER — CINACALCET 30 MG/1
30 TABLET, FILM COATED ORAL
Status: DISCONTINUED | OUTPATIENT
Start: 2017-10-14 | End: 2017-10-14

## 2017-10-14 RX ORDER — POTASSIUM CHLORIDE 7.45 MG/ML
10 INJECTION INTRAVENOUS
Status: DISCONTINUED | OUTPATIENT
Start: 2017-10-14 | End: 2017-10-14 | Stop reason: SDUPTHER

## 2017-10-14 RX ORDER — LANOLIN ALCOHOL/MO/W.PET/CERES
2500 CREAM (GRAM) TOPICAL DAILY
Status: DISCONTINUED | OUTPATIENT
Start: 2017-10-15 | End: 2017-10-17 | Stop reason: HOSPADM

## 2017-10-14 RX ORDER — ERGOCALCIFEROL 1.25 MG/1
50000 CAPSULE ORAL
Status: DISCONTINUED | OUTPATIENT
Start: 2017-10-19 | End: 2017-10-17 | Stop reason: HOSPADM

## 2017-10-14 RX ORDER — LEVOFLOXACIN 5 MG/ML
750 INJECTION, SOLUTION INTRAVENOUS
Status: DISCONTINUED | OUTPATIENT
Start: 2017-10-16 | End: 2017-10-14 | Stop reason: DRUGHIGH

## 2017-10-14 RX ORDER — LEVOFLOXACIN 5 MG/ML
500 INJECTION, SOLUTION INTRAVENOUS
Status: DISCONTINUED | OUTPATIENT
Start: 2017-10-16 | End: 2017-10-17 | Stop reason: HOSPADM

## 2017-10-14 RX ORDER — POTASSIUM CHLORIDE 750 MG/1
40 TABLET, FILM COATED, EXTENDED RELEASE ORAL
Status: COMPLETED | OUTPATIENT
Start: 2017-10-14 | End: 2017-10-14

## 2017-10-14 RX ORDER — HYDROXYCHLOROQUINE SULFATE 200 MG/1
200 TABLET, FILM COATED ORAL 2 TIMES DAILY
Status: DISCONTINUED | OUTPATIENT
Start: 2017-10-14 | End: 2017-10-17 | Stop reason: HOSPADM

## 2017-10-14 RX ORDER — LEVOFLOXACIN 5 MG/ML
750 INJECTION, SOLUTION INTRAVENOUS
Status: COMPLETED | OUTPATIENT
Start: 2017-10-14 | End: 2017-10-14

## 2017-10-14 RX ORDER — POTASSIUM CHLORIDE 7.45 MG/ML
10 INJECTION INTRAVENOUS
Status: COMPLETED | OUTPATIENT
Start: 2017-10-15 | End: 2017-10-15

## 2017-10-14 RX ORDER — CALCITRIOL 0.25 UG/1
0.25 CAPSULE ORAL DAILY
Status: DISCONTINUED | OUTPATIENT
Start: 2017-10-15 | End: 2017-10-17 | Stop reason: HOSPADM

## 2017-10-14 RX ORDER — AMLODIPINE BESYLATE 5 MG/1
10 TABLET ORAL DAILY
Status: DISCONTINUED | OUTPATIENT
Start: 2017-10-14 | End: 2017-10-17 | Stop reason: HOSPADM

## 2017-10-14 RX ORDER — OXYCODONE HYDROCHLORIDE 5 MG/1
5 TABLET ORAL
Status: DISCONTINUED | OUTPATIENT
Start: 2017-10-14 | End: 2017-10-17 | Stop reason: HOSPADM

## 2017-10-14 RX ORDER — ALLOPURINOL 100 MG/1
100 TABLET ORAL DAILY
Status: DISCONTINUED | OUTPATIENT
Start: 2017-10-15 | End: 2017-10-17 | Stop reason: HOSPADM

## 2017-10-14 RX ORDER — METOPROLOL SUCCINATE 50 MG/1
100 TABLET, EXTENDED RELEASE ORAL DAILY
Status: DISCONTINUED | OUTPATIENT
Start: 2017-10-14 | End: 2017-10-17 | Stop reason: HOSPADM

## 2017-10-14 RX ORDER — SODIUM CHLORIDE 0.9 % (FLUSH) 0.9 %
5-10 SYRINGE (ML) INJECTION EVERY 8 HOURS
Status: DISCONTINUED | OUTPATIENT
Start: 2017-10-14 | End: 2017-10-17 | Stop reason: HOSPADM

## 2017-10-14 RX ORDER — HYDRALAZINE HYDROCHLORIDE 20 MG/ML
10 INJECTION INTRAMUSCULAR; INTRAVENOUS
Status: DISCONTINUED | OUTPATIENT
Start: 2017-10-14 | End: 2017-10-17 | Stop reason: HOSPADM

## 2017-10-14 RX ORDER — SENNOSIDES 8.6 MG/1
1 TABLET ORAL
Status: DISCONTINUED | OUTPATIENT
Start: 2017-10-14 | End: 2017-10-17 | Stop reason: HOSPADM

## 2017-10-14 RX ORDER — ONDANSETRON 2 MG/ML
8 INJECTION INTRAMUSCULAR; INTRAVENOUS
Status: COMPLETED | OUTPATIENT
Start: 2017-10-14 | End: 2017-10-14

## 2017-10-14 RX ORDER — SODIUM CHLORIDE 0.9 % (FLUSH) 0.9 %
5-10 SYRINGE (ML) INJECTION AS NEEDED
Status: DISCONTINUED | OUTPATIENT
Start: 2017-10-14 | End: 2017-10-17 | Stop reason: HOSPADM

## 2017-10-14 RX ORDER — ONDANSETRON 2 MG/ML
4 INJECTION INTRAMUSCULAR; INTRAVENOUS
Status: DISCONTINUED | OUTPATIENT
Start: 2017-10-14 | End: 2017-10-17 | Stop reason: HOSPADM

## 2017-10-14 RX ADMIN — PREDNISONE 9 MG: 5 TABLET ORAL at 18:40

## 2017-10-14 RX ADMIN — POTASSIUM CHLORIDE 40 MEQ: 750 TABLET, FILM COATED, EXTENDED RELEASE ORAL at 19:06

## 2017-10-14 RX ADMIN — POTASSIUM CHLORIDE 10 MEQ: 7.46 INJECTION, SOLUTION INTRAVENOUS at 23:36

## 2017-10-14 RX ADMIN — CLONIDINE HYDROCHLORIDE 0.3 MG: 0.2 TABLET ORAL at 18:40

## 2017-10-14 RX ADMIN — ONDANSETRON 8 MG: 2 INJECTION INTRAMUSCULAR; INTRAVENOUS at 15:41

## 2017-10-14 RX ADMIN — AMLODIPINE BESYLATE 10 MG: 5 TABLET ORAL at 18:40

## 2017-10-14 RX ADMIN — OXYCODONE HYDROCHLORIDE 5 MG: 5 TABLET ORAL at 18:40

## 2017-10-14 RX ADMIN — WARFARIN SODIUM 4 MG: 2 TABLET ORAL at 18:40

## 2017-10-14 RX ADMIN — HYDROXYCHLOROQUINE SULFATE 200 MG: 200 TABLET, FILM COATED ORAL at 18:40

## 2017-10-14 RX ADMIN — LEVOFLOXACIN 750 MG: 5 INJECTION, SOLUTION INTRAVENOUS at 16:30

## 2017-10-14 RX ADMIN — METOPROLOL SUCCINATE 100 MG: 50 TABLET, EXTENDED RELEASE ORAL at 18:40

## 2017-10-14 NOTE — PROGRESS NOTES
Admission Medication Reconciliation:    Information obtained from: Patient    Significant PMH/Disease States:   Past Medical History:   Diagnosis Date    Anemia     secondary to lupus    Asthma     no inhaler use in past 2 to 3 years    Carditis     Chronic pain     DDD (degenerative disc disease), lumbar     [facility]     Heart failure ([facility]     Hypercholesterolemia     Hypertension     Intractable nausea and vomiting 10/21/2015    Long term (current) use of anticoagulants     Lupus (systemic lupus erythematosus) (HCC)     Malignant hypertension with chronic kidney disease stage V ([facility]     Peritoneal dialysis status ([facility] 10/2015    [facility] 2013    lungs       Chief Complaint for this Admission:  Pneumonia    Allergies:  Compazine [prochlorperazine edisylate] and Fish containing products    Prior to Admission Medications:   Prior to Admission Medications   Prescriptions Last Dose Informant Patient Reported? Taking? AURYXIA 210 mg iron tablet 10/12/2017  Yes Yes   Sig: TK 2 TS PO WITH MEALS   allopurinol (ZYLOPRIM) 100 mg tablet 10/12/2017  Yes Yes   Sig: Take 100 mg by mouth daily. amLODIPine (NORVASC) 10 mg tablet 10/12/2017  No Yes   Sig: Take 1 Tab by mouth daily. calcitRIOL (ROCALTROL) 0.25 mcg capsule   Yes No   Sig: Take 0.25 mcg by mouth daily. cloNIDine HCl (CATAPRES) 0.3 mg tablet 10/12/2017  No Yes   Sig: Take 1 Tab by mouth two (2) times a day. For blood pressure   Patient taking differently: Take 0.3 mg by mouth three (3) times daily. For blood pressure   cyanocobalamin (VITAMIN B-12) 2,500 mcg sublingual tablet 10/12/2017  No Yes   Sig: Take 1 Tab by mouth daily. ergocalciferol (VITAMIN D2) 50,000 unit capsule 10/12/2017  Yes Yes   Sig: Take 50,000 Units by mouth every seven (7) days. furosemide (LASIX) 40 mg tablet 10/12/2017  No Yes   Sig: Take 1 Tab by mouth daily.  For fluid   hydroxychloroquine (PLAQUENIL) 200 mg tablet 10/12/2017 Yes Yes   Sig: Take 200 mg by mouth two (2) times a day. losartan (COZAAR) 50 mg tablet 10/12/2017  No Yes   Sig: Take 1 Tab by mouth daily. For blood pressure   metoprolol succinate (TOPROL-XL) 100 mg tablet 10/12/2017  No Yes   Sig: Take 1 Tab by mouth daily. For blood pressure   ondansetron hcl (ZOFRAN) 8 mg tablet   No Yes   Sig: Take 1 Tab by mouth every eight (8) hours as needed. For Nausea  Indications: GASTROPARESIS   oxyCODONE IR (ROXICODONE) 5 mg immediate release tablet   No Yes   Sig: Take 1 tab in AM and HALF to 1 tab in PM if needed on days where back pain is severe      pantoprazole (PROTONIX) 40 mg tablet 10/12/2017  No No   Sig: Take 1 Tab by mouth Daily (before breakfast). predniSONE (DELTASONE) 10 mg tablet 10/12/2017  Yes Yes   Sig: Take 9 mg by mouth daily (with breakfast). senna (SENNA) 8.6 mg tablet   Yes Yes   Sig: Take 1 Tab by mouth daily as needed. warfarin (COUMADIN) 2.5 mg tablet 10/1/2017  No Yes   Sig: Take 1 Tab by mouth daily. Facility-Administered Medications: None         Comments/Recommendations: Patient is an excellent historian. Allergies reviewed and confirmed. Patient states she has not taken her medications since Wednesday \"because I feel so sick. \"    Please note:  1. WARFARIN: patient stopped taking \"about two weeks ago\", started having respiratory sx afterward, has hx of blood clots. Notified Dr. Elie Cheung by [facility] about this finding. Revised:  1. Clonidine to TID    Deleted:  1. Cinalcet (no longer taking)    Thank you for allowing me to participate in the care of this very pleasant young lady. Akil Wyman PharmD, RN #[id number]          Patient is on peritoneal dialysis, has been for about two years, does her exchanges nightly.

## 2017-10-14 NOTE — ED PROVIDER NOTES
HPI Comments: 33 y.o. female with past medical history significant for HTN, anemia, heart failure, hypercholesterolemia, chronic pain, lupus, PE, asthma, ESRD, and DDD who presents from home with chief complaint of SOB. Pt states for the past 1-2 weeks she has had malaise with accompanying rhinorrhea and cough. Pt states she was seen at [facility] ED yesterday and diagnosed with pneumonia. Pt states she was discharged and started on two antibiotics, but she states she does not think she should have been discharged. Pt states has had persistent SOB that is exacerbated with exertion. Pt reports having an accompanying productive cough, generalized body aches, nausea, vomiting, and a fever last night. Pt reports history of PE. Pt states for the past two years she has been on peritoneal dialysis and notes she is followed by Dr. Maxine Wong, Nephrology. Pt denies having diarrhea. There are no other acute medical concerns at this time. PCP: Dewayne Soriano NP    Nephrologist: Dr. Maxine Wong    Note written by Racquel Fernández, as dictated by Zuhair Franco MD 2:15 PM    The history is provided by the patient.         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 ([address]     Peritoneal dialysis status ([address] 10/2015    Thromboembolus ([address] 2013    lungs       Past Surgical History:   Procedure Laterality Date    HX  SECTION  2006    HX OTHER SURGICAL  9/16/15    INSERTION PD CATH         Family History:   Problem Relation Age of Onset    Diabetes Father     Hypertension Father     Cancer Other      aunt with breast cancer    Diabetes Mother        Social History     Social History    Marital status: SINGLE     Spouse name: N/A    Number of children: N/A    Years of education: N/A     Occupational History    Not on file. Social History Main Topics    Smoking status: Never Smoker    Smokeless tobacco: Never Used    Alcohol use No    Drug use: Yes     Special: Prescription, OTC    Sexual activity: Not Currently     Other Topics Concern     Service No    Blood Transfusions No    Caffeine Concern No    Occupational Exposure No    Hobby Hazards No    Sleep Concern No    Stress Concern No    Weight Concern No    Special Diet No    Back Care Yes     low back pain    Exercise No    Bike Helmet No    Seat Belt Yes    Self-Exams No     Social History Narrative    Lives with parents and daughter. ALLERGIES: Compazine [prochlorperazine edisylate] and Fish containing products    Review of Systems   Constitutional: Negative for activity change, appetite change and fever. HENT: Positive for rhinorrhea. Negative for ear pain, facial swelling, sore throat and trouble swallowing. Eyes: Negative for pain, discharge and visual disturbance. Respiratory: Positive for cough (productive) and shortness of breath. Negative for chest tightness and wheezing. Cardiovascular: Negative for chest pain and palpitations. Gastrointestinal: Positive for nausea and vomiting. Negative for abdominal pain, blood in stool and diarrhea. Genitourinary: Negative for difficulty urinating, flank pain and hematuria. Musculoskeletal: Positive for myalgias. Negative for arthralgias, joint swelling and neck pain. Skin: Negative for color change and rash. Neurological: Negative for dizziness, weakness, numbness and headaches. Hematological: Negative for adenopathy. Does not bruise/bleed easily. Psychiatric/Behavioral: Negative for behavioral problems, confusion and sleep disturbance. All other systems reviewed and are negative. Vitals:    10/14/17 1354 10/14/17 1407   BP: (!) 167/124    Pulse: (!) 118    Resp: 16    Temp: 99.1 °F (37.3 °C)    SpO2: 94% 97%   Weight: 69.9 kg (154 lb)             Physical Exam   Constitutional: She is oriented to person, place, and time. She appears well-developed and well-nourished. No distress. HENT:   Head: Normocephalic and atraumatic. Mouth/Throat: Oropharynx is clear and moist.   Nasal congestion. Vocal hoarseness. Eyes: Conjunctivae and EOM are normal. Pupils are equal, round, and reactive to light. No scleral icterus. Neck: Normal range of motion. Neck supple. No JVD present. No tracheal deviation present. No thyromegaly present. No carotid bruits noted. Cardiovascular: Normal rate, regular rhythm, normal heart sounds and intact distal pulses. Exam reveals no gallop and no friction rub. No murmur heard. Pulmonary/Chest: Effort normal. No respiratory distress. She has decreased breath sounds (laterally and posteriorly). She has no wheezes. She has no rhonchi. She has no rales. She exhibits no tenderness. Abdominal: Soft. Bowel sounds are normal. She exhibits distension (slight). She exhibits no mass. There is no tenderness. There is no rebound and no guarding. Musculoskeletal: Normal range of motion. She exhibits no edema or tenderness. Lymphadenopathy:     She has no cervical adenopathy. Neurological: She is alert and oriented to person, place, and time. She has normal reflexes. No cranial nerve deficit. Coordination normal.   Skin: Skin is warm and dry. No rash noted. No erythema. Psychiatric: She has a normal mood and affect. Her behavior is normal. Judgment and thought content normal.   Nursing note and vitals reviewed. Note written by Donny Umana.  Mildred Ambrocio, as dictated by Rachel Saavedra MD 2:15 PM       [facility]  Number of Diagnoses or Management Options     Amount and/or Complexity of Data Reviewed  Tests in the radiology section of CPT®: ordered and reviewed  Decide to obtain previous medical records or to obtain history from someone other than the patient: yes  Review and summarize past medical records: yes  Discuss the patient with other providers: yes  Independent visualization of images, tracings, or specimens: yes      ED Course       Procedures    CONSULT NOTE:  3:53 PM Brenda Awan MD spoke with Dr. Elver Burr, Consult for Hospitalist.  Discussed available diagnostic tests and clinical findings. He is in agreement with care plans as outlined. He will see and admit.

## 2017-10-14 NOTE — PROGRESS NOTES
Bedside and Verbal shift change report given to Mike Jimenez RN (oncoming nurse) by Ali Moritz, RN (offgoing nurse). Report included the following information SBAR, Kardex, Intake/Output, MAR, Accordion, Recent Results and Med Rec Status.

## 2017-10-14 NOTE — IP AVS SNAPSHOT
[address] [address] 
[phone] Patient: Alton Guadarrama MRN: [id number] :1985 You are allergic to the following Allergen Reactions Compazine (Prochlorperazine Edisylate) Nausea and Vomiting Palpitations Fish Containing Products Rash An itchy rash appeared in about 1 hour Recent Documentation Weight BMI OB Status Smoking Status 77.6 kg 28.47 kg/m2 Medically Induced Never Smoker Unresulted Labs Order Current Status CULTURE, BLOOD, PAIRED Preliminary result Emergency Contacts Name Discharge Info Relation Home Work Mobile [address] CAREGIVER [3] Parent [1] [phone] About your hospitalization You were admitted on:  2017 You last received care in the:  [place] [id number] You were discharged on:  2017 Unit phone number:  [phone] Why you were hospitalized Your primary diagnosis was:  Community Acquired Pneumonia Of Left Lower Lobe Of Lung (Hcc) Your diagnoses also included:  Pleural Effusion, Left, Anemia Of Renal Disease, Esrd On Peritoneal Dialysis (Hcc), Lupus, Hypertension Providers Seen During Your Hospitalizations Provider Role Specialty Primary office phone Hernan Watson MD Attending Provider Emergency Medicine [phone] Kai Walker MD Attending Provider Internal Medicine [phone] Hannah Begum DO Attending Provider Hospitalist [phone] Zoila Lofton MD Attending Provider Internal Medicine [phone] Your Primary Care Physician (PCP) Primary Care Physician Office Phone Office Fax Alex Suazo [phone] Follow-up Information Follow up With Details Comments Contact Info  Norah Casillas NP On 10/24/2017 For discharge follow up on 10/24/2017 at 10:30 AM  [address] 
[phone] Your Appointments Tuesday October 24, 2017 10:30 AM EDT TRANSITIONAL CARE MANAGEMENT with Renetta Muse  [facility] ([facility]) [address]  
[phone] Friday November 10, 2017 10:20 AM EST Follow Up with Rodriguez Francois MD  
[facility]) [facility] [address] [facility] [address] [phone] Current Discharge Medication List  
  
START taking these medications Dose & Instructions Dispensing Information Comments Morning Noon Evening Bedtime  
 apixaban 5 mg tablet Commonly known as:  Wonda Debrant Your last dose was: Your next dose is:    
   
   
 Dose:  10 mg Take 2 Tabs by mouth two (2) times a day. For 6 days and then 1 tab twice daily Quantity:  70 Tab Refills:  2  
     
   
   
   
  
 l.acidoph-B.lactis-B.longum 460 mg (7.5-6- 1.5 bill. cell) Cap cap Commonly known as:  [id number] Your last dose was: Your next dose is:    
   
   
 Dose:  1 Cap Take 1 Cap by mouth Daily (before breakfast). Quantity:  5 Cap Refills:  0  
     
   
   
   
  
 levoFLOXacin 500 mg tablet Commonly known as:  Roma Bennett Your last dose was: Your next dose is:    
   
   
 Dose:  500 mg Take 1 Tab by mouth every fourty-eight (48) hours. Quantity:  3 Tab Refills:  0 CONTINUE these medications which have CHANGED Dose & Instructions Dispensing Information Comments Morning Noon Evening Bedtime  
 cloNIDine HCl 0.3 mg tablet Commonly known as:  CATAPRES What changed:   
- when to take this 
- additional instructions Your last dose was: Your next dose is:    
   
   
 Dose:  0.3 mg Take 1 Tab by mouth two (2) times a day. For blood pressure Quantity:  60 Tab Refills:  5 CONTINUE these medications which have NOT CHANGED Dose & Instructions Dispensing Information Comments Morning Noon Evening Bedtime  
 allopurinol 100 mg tablet Commonly known as:  Aurora Almendarez Your last dose was: Your next dose is:    
   
   
 Dose:  100 mg Take 100 mg by mouth daily. Refills:  0  
     
   
   
   
  
 amLODIPine 10 mg tablet Commonly known as:  Enzo Miko Your last dose was: Your next dose is:    
   
   
 Dose:  10 mg Take 1 Tab by mouth daily. Quantity:  30 Tab Refills:  5  
     
   
   
   
  
 AURYXIA 210 mg iron tablet Generic drug:  ferric citrate Your last dose was: Your next dose is:    
   
   
 TK 2 TS PO WITH MEALS Refills:  12  
     
   
   
   
  
 calcitRIOL 0.25 mcg capsule Commonly known as:  ROCALTROL Your last dose was: Your next dose is:    
   
   
 Dose:  0.25 mcg Take 0.25 mcg by mouth daily. Refills:  5  
     
   
   
   
  
 cyanocobalamin 2,500 mcg sublingual tablet Commonly known as:  VITAMIN B-12 Your last dose was: Your next dose is:    
   
   
 Dose:  2500 mcg Take 1 Tab by mouth daily. Quantity:  90 Tab Refills:  1  
     
   
   
   
  
 furosemide 40 mg tablet Commonly known as:  LASIX Your last dose was: Your next dose is:    
   
   
 Dose:  40 mg Take 1 Tab by mouth daily. For fluid Quantity:  30 Tab Refills:  5  
     
   
   
   
  
 losartan 50 mg tablet Commonly known as:  COZAAR Your last dose was: Your next dose is:    
   
   
 Dose:  50 mg Take 1 Tab by mouth daily. For blood pressure Quantity:  30 Tab Refills:  5  
     
   
   
   
  
 metoprolol succinate 100 mg tablet Commonly known as:  TOPROL-XL Your last dose was: Your next dose is:    
   
   
 Dose:  100 mg Take 1 Tab by mouth daily. For blood pressure Quantity:  30 Tab Refills:  5  
     
   
   
   
  
 ondansetron hcl 8 mg tablet Commonly known as:  Darlene Holden Your last dose was: Your next dose is:    
   
   
 Dose:  8 mg Take 1 Tab by mouth every eight (8) hours as needed. For Nausea  Indications: GASTROPARESIS Quantity:  15 Tab Refills:  1  
     
   
   
   
  
 oxyCODONE IR 5 mg immediate release tablet Commonly known as:  Go Logan Your last dose was: Your next dose is: Take 1 tab in AM and HALF to 1 tab in PM if needed on days where back pain is severe Quantity:  40 Tab Refills:  0  
     
   
   
   
  
 pantoprazole 40 mg tablet Commonly known as:  PROTONIX Your last dose was: Your next dose is:    
   
   
 Dose:  40 mg Take 1 Tab by mouth Daily (before breakfast). Quantity:  30 Tab Refills:  0  
     
   
   
   
  
 PLAQUENIL 200 mg tablet Generic drug:  hydroxychloroquine Your last dose was: Your next dose is:    
   
   
 Dose:  200 mg Take 200 mg by mouth two (2) times a day. Refills:  0  
     
   
   
   
  
 predniSONE 10 mg tablet Commonly known as:  Alicia Clancy Your last dose was: Your next dose is:    
   
   
 Dose:  9 mg Take 9 mg by mouth daily (with breakfast). Refills:  0 Senna 8.6 mg tablet Generic drug:  senna Your last dose was: Your next dose is:    
   
   
 Dose:  1 Tab Take 1 Tab by mouth daily as needed. Refills:  0  
     
   
   
   
  
 VITAMIN D2 50,000 unit capsule Generic drug:  ergocalciferol Your last dose was: Your next dose is:    
   
   
 Dose:  [address] Units Take 50,000 Units by mouth every seven (7) days. Refills:  0 STOP taking these medications   
 warfarin 2.5 mg tablet Commonly known as:  COUMADIN Where to Get Your Medications Information on where to get these meds will be given to you by the nurse or doctor. ! Ask your nurse or doctor about these medications  
  apixaban 5 mg tablet  
 l.acidoph-B.lactis-B.longum 460 mg (7.5-6- 1.5 bill. cell) Cap cap  
 levoFLOXacin 500 mg tablet Discharge Instructions DISCHARGE SUMMARY from Nurse The following personal items are in your possession at time of discharge: 
 
Dental Appliances: None Visual Aid: None Home Medications: None Jewelry: Caridad Claudines Clothing: At bedside Other Valuables: None PATIENT INSTRUCTIONS: 
 
 
F-face looks uneven A-arms unable to move or move unevenly S-speech slurred or non-existent T-time-call 911 as soon as signs and symptoms begin-DO NOT go Back to bed or wait to see if you get better-TIME IS BRAIN. Warning Signs of HEART ATTACK Call 911 if you have these symptoms: 
? Chest discomfort. Most heart attacks involve discomfort in the center of the chest that lasts more than a few minutes, or that goes away and comes back. It can feel like uncomfortable pressure, squeezing, fullness, or pain. ? Discomfort in other areas of the upper body. Symptoms can include pain or discomfort in one or both arms, the back, neck, jaw, or stomach. ? Shortness of breath with or without chest discomfort. ? Other signs may include breaking out in a cold sweat, nausea, or lightheadedness. Don't wait more than five minutes to call [address]! Fast action can save your life. Calling 911 is almost always the fastest way to get lifesaving treatment. Emergency Medical Services staff can begin treatment when they arrive  up to an hour sooner than if someone gets to the hospital by car. The discharge information has been reviewed with the patient. The patient verbalized understanding.  
 
Discharge medications reviewed with the patient and appropriate educational materials and side effects teaching were provided. Discharge Instructions PATIENT ID: Tutu Dasilva MRN: [id number] YOB: 1985 DATE OF ADMISSION: 10/14/2017  1:54 PM   
DATE OF DISCHARGE: 10/17/2017 PRIMARY CARE PROVIDER: Tina Garcia NP  
 
ATTENDING PHYSICIAN: Krista Kolb MD 
DISCHARGING PROVIDER: Krista Kolb MD   
To contact this individual call [phone] and ask the  to page. If unavailable ask to be transferred the Adult Hospitalist Department. DISCHARGE DIAGNOSES Community Acquired Pneumonia CONSULTATIONS: IP CONSULT TO HOSPITALIST 
IP CONSULT TO NEPHROLOGY PROCEDURES/SURGERIES: * No surgery found * PENDING TEST RESULTS:  
At the time of discharge the following test results are still pending: none FOLLOW UP APPOINTMENTS:  
Follow-up Information Follow up With Details Comments Contact Info Tina Garcia NP In 1 week  [address] 
[phone] ADDITIONAL CARE RECOMMENDATIONS:  
Follow with PMD in 1 week Follow up with Nephrology in 1-2 weeks DIET: Renal Diet ACTIVITY: Activity as tolerated DISCHARGE MEDICATIONS: 
 See Medication Reconciliation Form · It is important that you take the medication exactly as they are prescribed. · Keep your medication in the bottles provided by the pharmacist and keep a list of the medication names, dosages, and times to be taken in your wallet. · Do not take other medications without consulting your doctor. NOTIFY YOUR PHYSICIAN FOR ANY OF THE FOLLOWING:  
Fever over 101 degrees for 24 hours. Chest pain, shortness of breath, fever, chills, nausea, vomiting, diarrhea, change in mentation, falling, weakness, bleeding. Severe pain or pain not relieved by medications. Or, any other signs or symptoms that you may have questions about.  
 
 
DISPOSITION: 
 x Home With: 
 OT  PT  Jhonatan Montgomery RN  
  
 SNF/Inpatient Rehab/LTAC Independent/assisted living Hospice Other: CDMP Checked:  
Yes x PROBLEM LIST Updated: 
Yes x Signed:  
Blake Estevez MD 
10/17/2017 
9:06 AM 
 
Discharge Orders None ACO Transitions of Care Introducing [employer] [address] Ekaterina Krishnamurthy offers a voluntary care coordination program to provide high quality service and care to [facility] fee-for-service beneficiaries. Lydia Hutchison was designed to help you enhance your health and well-being through the following services: ? Transitions of Care  support for individuals who are transitioning from one care setting to another (example: Hospital to home). ? Chronic and Complex Care Coordination  support for individuals and caregivers of those with serious or chronic illnesses or with more than one chronic (ongoing) condition and those who take a number of different medications. If you meet specific medical criteria, a [address] [place] [address] Rd may call you directly to coordinate your care with your primary care physician and your other care providers. For questions about the [facility] programs, please, contact your physicians office. For general questions or additional information about Accountable Care Organizations: 
Please visit www.medicare.gov/acos. html or call 1-800-MEDICARE (2-825.677.8516) TTY users should call 6-500.296.2364. [employer] Announcement We are excited to announce that we are making your provider's discharge notes available to you in [employer]. You will see these notes when they are completed and signed by the physician that discharged you from your recent hospital stay. If you have any questions or concerns about any information you see in [employer], please call the Health Information Department where you were seen or reach out to your Primary Care Provider for more information about your plan of care. Introducing [facility] & HEALTH SERVICES! Emmie Mcintyre introduces RealPage patient portal. Now you can access parts of your medical record, email your doctor's office, and request medication refills online. 1. In your internet browser, go to [email or web address] 2. Click on the First Time User? Click Here link in the Sign In box. You will see the New Member Sign Up page. 3. Enter your RealPage Access Code exactly as it appears below. You will not need to use this code after youve completed the sign-up process. If you do not sign up before the expiration date, you must request a new code. · RealPage Access Code: [email or web address] Expires: 11/16/2017 11:22 AM 
 
4. Enter the last four digits of your Social Security Number (xxxx) and Date of Birth (mm/dd/yyyy) as indicated and click Submit. You will be taken to the next sign-up page. 5. Create a RealPage ID. This will be your RealPage login ID and cannot be changed, so think of one that is secure and easy to remember. 6. Create a RealPage password. You can change your password at any time. 7. Enter your Password Reset Question and Answer. This can be used at a later time if you forget your password. 8. Enter your e-mail address. You will receive e-mail notification when new information is available in [address] 9. Click Sign Up. You can now view and download portions of your medical record. 10. Click the Download Summary menu link to download a portable copy of your medical information. If you have questions, please visit the Frequently Asked Questions section of the RealPage website. Remember, RealPage is NOT to be used for urgent needs. For medical emergencies, dial 911. Now available from your iPhone and Android! General Information Please provide this summary of care documentation to your next provider. Patient Signature:  ____________________________________________________________ Date:  ____________________________________________________________  
  
Liliya Garcia Provider Signature:  ____________________________________________________________ Date:  ____________________________________________________________

## 2017-10-14 NOTE — PROGRESS NOTES
Pharmacist Note - Warfarin Dosing  Consult provided for this 33 y. o.female to manage warfarin for h/o VTE     INR Goal: 2 - 3    Home regimen/ tablet size: 2.5 mg daily (has not taken for about 2 weeks)    Drugs that may increase INR: Levaquin, allopurinol  Drugs that may decrease INR: None  Other current anticoagulants/ drugs that may increase bleeding risk: None  Risk factors:  none  Daily INR ordered: YES    Recent Labs      10/14/17   1538   HGB  11.1*   INR  1.2*     Date               INR                  Dose  10/14  1.2  4 mg                                                                               Assessment/ Plan: Will order warfarin 4 mg PO x 1 dose. Pharmacy will continue to monitor daily and adjust therapy as indicated. Please contact the pharmacist at   for outpatient recommendations if needed.

## 2017-10-14 NOTE — ED TRIAGE NOTES
Dx with pneumonia yesterday at [facility]. Prescribed antibiotics \"but I don't remember the name. I have Lupus and don't think they should have d/c me\". Pt reports productive cough since Tuesday.

## 2017-10-14 NOTE — IP AVS SNAPSHOT
[address] 
[phone] Patient: Duane Carpenter MRN: [id number] :1985 Current Discharge Medication List  
  
START taking these medications Dose & Instructions Dispensing Information Comments Morning Noon Evening Bedtime  
 apixaban 5 mg tablet Commonly known as:  Sonia Jenkins Your last dose was: Your next dose is:    
   
   
 Dose:  10 mg Take 2 Tabs by mouth two (2) times a day. For 6 days and then 1 tab twice daily Quantity:  70 Tab Refills:  2  
     
   
   
   
  
 l.acidoph-B.lactis-B.longum 460 mg (7.5-6- 1.5 bill. cell) Cap cap Commonly known as:  [id number] Your last dose was: Your next dose is:    
   
   
 Dose:  1 Cap Take 1 Cap by mouth Daily (before breakfast). Quantity:  5 Cap Refills:  0  
     
   
   
   
  
 levoFLOXacin 500 mg tablet Commonly known as:  Mattie Born Your last dose was: Your next dose is:    
   
   
 Dose:  500 mg Take 1 Tab by mouth every fourty-eight (48) hours. Quantity:  3 Tab Refills:  0 CONTINUE these medications which have CHANGED Dose & Instructions Dispensing Information Comments Morning Noon Evening Bedtime  
 cloNIDine HCl 0.3 mg tablet Commonly known as:  CATAPRES What changed:   
- when to take this 
- additional instructions Your last dose was: Your next dose is:    
   
   
 Dose:  0.3 mg Take 1 Tab by mouth two (2) times a day. For blood pressure Quantity:  60 Tab Refills:  5 CONTINUE these medications which have NOT CHANGED Dose & Instructions Dispensing Information Comments Morning Noon Evening Bedtime  
 allopurinol 100 mg tablet Commonly known as:  Gaylan Elders Your last dose was: Your next dose is:    
   
   
 Dose:  100 mg Take 100 mg by mouth daily. Refills:  0 amLODIPine 10 mg tablet Commonly known as:  Giselbob Peterson Your last dose was: Your next dose is:    
   
   
 Dose:  10 mg Take 1 Tab by mouth daily. Quantity:  30 Tab Refills:  5  
     
   
   
   
  
 AURYXIA 210 mg iron tablet Generic drug:  ferric citrate Your last dose was: Your next dose is:    
   
   
 TK 2 TS PO WITH MEALS Refills:  12  
     
   
   
   
  
 calcitRIOL 0.25 mcg capsule Commonly known as:  ROCALTROL Your last dose was: Your next dose is:    
   
   
 Dose:  0.25 mcg Take 0.25 mcg by mouth daily. Refills:  5  
     
   
   
   
  
 cyanocobalamin 2,500 mcg sublingual tablet Commonly known as:  VITAMIN B-12 Your last dose was: Your next dose is:    
   
   
 Dose:  2500 mcg Take 1 Tab by mouth daily. Quantity:  90 Tab Refills:  1  
     
   
   
   
  
 furosemide 40 mg tablet Commonly known as:  LASIX Your last dose was: Your next dose is:    
   
   
 Dose:  40 mg Take 1 Tab by mouth daily. For fluid Quantity:  30 Tab Refills:  5  
     
   
   
   
  
 losartan 50 mg tablet Commonly known as:  COZAAR Your last dose was: Your next dose is:    
   
   
 Dose:  50 mg Take 1 Tab by mouth daily. For blood pressure Quantity:  30 Tab Refills:  5  
     
   
   
   
  
 metoprolol succinate 100 mg tablet Commonly known as:  TOPROL-XL Your last dose was: Your next dose is:    
   
   
 Dose:  100 mg Take 1 Tab by mouth daily. For blood pressure Quantity:  30 Tab Refills:  5  
     
   
   
   
  
 ondansetron hcl 8 mg tablet Commonly known as:  Tommas Harder Your last dose was: Your next dose is:    
   
   
 Dose:  8 mg Take 1 Tab by mouth every eight (8) hours as needed. For Nausea  Indications: GASTROPARESIS Quantity:  15 Tab Refills:  1  
     
   
   
   
  
 oxyCODONE IR 5 mg immediate release tablet Commonly known as:  Orange Gun Your last dose was: Your next dose is: Take 1 tab in AM and HALF to 1 tab in PM if needed on days where back pain is severe Quantity:  40 Tab Refills:  0  
     
   
   
   
  
 pantoprazole 40 mg tablet Commonly known as:  PROTONIX Your last dose was: Your next dose is:    
   
   
 Dose:  40 mg Take 1 Tab by mouth Daily (before breakfast). Quantity:  30 Tab Refills:  0  
     
   
   
   
  
 PLAQUENIL 200 mg tablet Generic drug:  hydroxychloroquine Your last dose was: Your next dose is:    
   
   
 Dose:  200 mg Take 200 mg by mouth two (2) times a day. Refills:  0  
     
   
   
   
  
 predniSONE 10 mg tablet Commonly known as:  Katz Fossa Your last dose was: Your next dose is:    
   
   
 Dose:  9 mg Take 9 mg by mouth daily (with breakfast). Refills:  0 Senna 8.6 mg tablet Generic drug:  senna Your last dose was: Your next dose is:    
   
   
 Dose:  1 Tab Take 1 Tab by mouth daily as needed. Refills:  0  
     
   
   
   
  
 VITAMIN D2 50,000 unit capsule Generic drug:  ergocalciferol Your last dose was: Your next dose is:    
   
   
 Dose:  32830 Units Take 50,000 Units by mouth every seven (7) days. Refills:  0 STOP taking these medications   
 warfarin 2.5 mg tablet Commonly known as:  COUMADIN Where to Get Your Medications Information on where to get these meds will be given to you by the nurse or doctor. ! Ask your nurse or doctor about these medications  
  apixaban 5 mg tablet  
 l.acidoph-B.lactis-B.longum 460 mg (7.5-6- 1.5 bill. cell) Cap cap  
 levoFLOXacin 500 mg tablet

## 2017-10-14 NOTE — PROGRESS NOTES
Levaquin dose adjusted from 750 mg daily to 500 mg q48hr per protocol for ESRD on PD. Pharmacy to monitor daily and adjust if necessary.

## 2017-10-14 NOTE — H&P
History & Physical  Alan Raza MD, S    Date of admission: 10/14/2017    Patient name: Sana Bunch  MRN: [id number]  YOB: 1985  Age: 33 y.o. Primary care provider:  Amirah Gutiérrez NP   Nephrologist: Dr Andi Walker  Rheumatologist: Dr Renaldo Lopez of Information: patient, medical records and mother at bedside                                Chief complaint: \"I have pneumonia. \"    History of present illness  Sana Bunch is a 33 y.o. female with h/o PE on warfarin, ESRD on PD, lupus, anemia, lumbar DDD, hyperlipidemia, chronic pain, chronic LE edema, and HTN who presents to the ED from home on 10/14/17 with PNA, nausea, and vomiting. Patient states chills, cough, congestion, SOB started about 2 weeks ago and were not getting better so she went to [facility] ED yesterday. She had a CXR but no labs, was told she had pneumonia, given a dose of cefdinir in the ED then discharged from the ED with a script for cefdinir. She states she should have been admitted so she came to [facility] ED today. She reports getting her flu shot in September this year and thinks she may have had a PNA vaccine about 5 years ago. She c/o chest discomfort with coughing, cough productive of yellow sputum, SOB with exertion, and generalized body aches. She states that she last vomited yesterday, has been unable to take her meds or even hold down water due to the nausea. She reports adherence to PD and no complications with the catheter or PD itself. She denies weight changes, diarrhea, dysuria but only urinates about once every couple of days, abdominal pain. ED triage VS were temp 99.1F, , /124, RR 16, SpO2 94% on RA. In the ED, she received Zofran 8mg IVx1 and levofloxacin 750mg IVx1.      Past Medical History:   Diagnosis Date    Anemia     secondary to lupus    Asthma     no inhaler use in past 2 to 3 years    Carditis     Chronic pain     DDD (degenerative disc disease), lumbar     [facility] stage [facility] disease) [facility]     Heart failure ([facility]     Hypercholesterolemia     Hypertension     Intractable nausea and vomiting 10/21/2015    Long term (current) use of anticoagulants     Lupus (systemic lupus erythematosus) (HCC)     Malignant hypertension with chronic kidney disease stage V ([facility]     Peritoneal dialysis status ([facility] 10/2015    Thromboembolus ([facility] 2013    lungs      Past Surgical History:   Procedure Laterality Date    HX  SECTION  2006    HX OTHER SURGICAL  9/16/15    INSERTION PD CATH     Prior to Admission medications    Medication Sig Start Date End Date Taking? Authorizing Provider   oxyCODONE IR (ROXICODONE) 5 mg immediate release tablet Take 1 tab in AM and HALF to 1 tab in PM if needed on days where back pain is severe    10/13/17   Isa Alonzo MD   warfarin (COUMADIN) 2.5 mg tablet Take 1 Tab by mouth daily. 17   Dewayne Soriano NP   amLODIPine (NORVASC) 10 mg tablet Take 1 Tab by mouth daily. 10/7/16   Dewayne Soriano NP   losartan (COZAAR) 50 mg tablet Take 1 Tab by mouth daily. For blood pressure 10/7/16   Dewayne Soriano NP   cloNIDine HCl (CATAPRES) 0.3 mg tablet Take 1 Tab by mouth two (2) times a day. For blood pressure 10/7/16   Dewayne Soriano NP   metoprolol succinate (TOPROL-XL) 100 mg tablet Take 1 Tab by mouth daily. For blood pressure 10/7/16   Dewayne Soriano NP   furosemide (LASIX) 40 mg tablet Take 1 Tab by mouth daily. For fluid 10/7/16   Dewayne Soriano NP   cinacalcet (SENSIPAR) 30 mg tablet Take 30 mg by mouth daily. Historical Provider   AURYXIA 210 mg iron tablet TK 2 TS PO WITH MEALS 16   Historical Provider   predniSONE (DELTASONE) 10 mg tablet Take 9 mg by mouth daily (with breakfast). Historical Provider   ergocalciferol (VITAMIN D2) 50,000 unit capsule Take 50,000 Units by mouth every seven (7) days.     Darnell Franks MD hydroxychloroquine (PLAQUENIL) 200 mg tablet Take 200 mg by mouth two (2) times a day. Darnell Franks MD   ondansetron hcl (ZOFRAN) 8 mg tablet Take 1 Tab by mouth every eight (8) hours as needed. For Nausea  Indications: GASTROPARESIS 7/14/16   Shanique Arreguin MD   allopurinol (ZYLOPRIM) 100 mg tablet Take 100 mg by mouth daily. 10/15/15   Historical Provider   cyanocobalamin (VITAMIN B-12) 2,500 mcg sublingual tablet Take 1 Tab by mouth daily. 10/27/15   Nadege Briones MD   pantoprazole (PROTONIX) 40 mg tablet Take 1 Tab by mouth Daily (before breakfast). 10/23/15   Roseanne Mata MD   calcitRIOL (ROCALTROL) 0.25 mcg capsule Take 0.25 mcg by mouth daily. 8/6/15   Historical Provider   senna (SENNA) 8.6 mg tablet Take 1 Tab by mouth daily as needed. Historical Provider     Allergies   Allergen Reactions    Compazine [Prochlorperazine Edisylate] Nausea and Vomiting and Palpitations    Fish Containing Products Rash     An itchy rash appeared in about 1 hour      Family History   Problem Relation Age of Onset    Diabetes Father     Hypertension Father     Cancer Other      aunt with breast cancer    Diabetes Mother          Social history  Patient resides  x  Independently      With family care      Assisted living      SNF    Ambulates  x  Independently      With cane       Assisted walker         Alcohol history   x  None     Social     Chronic   Smoking history  x  None     Former smoker     Current smoker     Denies illicit drug use. Patient is on disability. History   Smoking Status    Never Smoker   Smokeless Tobacco    Never Used       Code status  x  Full code     DNR/DNI        Code status not discussed with the patient. Presumed Full Code status given young age. Review of systems  A comprehensive review of systems was negative except for that written in the History of Present Illness. The remainder of the review of systems was reviewed and is noncontributory.     Physical Examination   Visit Vitals    BP (!) 170/124    Pulse (!) 102    Temp 97.9 °F (36.6 °C)    Resp 19    Wt 69.9 kg (154 lb)    SpO2 100%    BMI 25.63 kg/m2          O2 Device: Room air    Gen: awake, NAD, cooperative,   Head: NCAT  EENT: PERRL, EOMI, dry MM, oropharynx benign  Neck: supple, no masses  Lungs: b/b rales, no wheeze  CVS: regular rhythm, normal rate, S1S2, no m/r/g appreciated, b/l LE peripheral edema, +pulses  Abd: +BS, soft, NT, ND, +PD catheter  MSK: moves all extremities  Neuro: AOx3, CN II-XII grossly intact, NFD, responds appropriately to questions and commands  Skin: warm, dry; no rashes, lesions, ulcers noted    Data Review    EKG: none    24 Hour Results:  Recent Results (from the past 24 hour(s))   INFLUENZA A & B AG (RAPID TEST)    Collection Time: 10/14/17  2:51 PM   Result Value Ref Range    Influenza A Antigen NEGATIVE  NEG      Influenza B Antigen NEGATIVE  NEG     CBC WITH AUTOMATED DIFF    Collection Time: 10/14/17  3:38 PM   Result Value Ref Range    WBC 4.2 3.6 - 11.0 K/uL    RBC 3.72 (L) 3.80 - 5.20 M/uL    HGB 11.1 (L) 11.5 - 16.0 g/dL    HCT 34.0 (L) 35.0 - 47.0 %    MCV 91.4 80.0 - 99.0 FL    MCH 29.8 26.0 - 34.0 PG    MCHC 32.6 30.0 - 36.5 g/dL    RDW 15.7 (H) 11.5 - 14.5 %    PLATELET 074 270 - 212 K/uL    NEUTROPHILS 45 32 - 75 %    LYMPHOCYTES 45 12 - 49 %    MONOCYTES 10 5 - 13 %    EOSINOPHILS 0 0 - 7 %    BASOPHILS 0 0 - 1 %    ABS. NEUTROPHILS 1.9 1.8 - 8.0 K/UL    ABS. LYMPHOCYTES 1.9 0.8 - 3.5 K/UL    ABS. MONOCYTES 0.4 0.0 - 1.0 K/UL    ABS. EOSINOPHILS 0.0 0.0 - 0.4 K/UL    ABS. BASOPHILS 0.0 0.0 - 0.1 K/UL   LACTIC ACID    Collection Time: 10/14/17  3:38 PM   Result Value Ref Range    Lactic acid 1.9 0.4 - 2.0 MMOL/L     Recent Labs      10/14/17   1538   WBC  4.2   HGB  11.1*   HCT  34.0*   PLT  244     No results for input(s): NA, K, CL, CO2, GLU, BUN, CREA, CA, MG, PHOS, ALB, TBIL, SGOT, ALT, INR in the last 72 hours.     No lab exists for component: INREXT    Imaging  CXR PA/lat - upon my review shows L basilar opacity    Assessment and Plan   Principal Problem:    Community acquired pneumonia of left lower lobe of lung ([address] and L pleural effusion (POA)  - patient seen/examined in ED on 10/14/17; admit inpatient remote telemetry  - BCx, CMP, lactic acid reportedly sent but not yet appearing in the chart  - empiric levofloxacin started in ED; will continue renal dosing 750mg IV q48h  - check urine strep/legionella antigens; concern for legionella given GI symptoms  - influenza swab negative  - supplemental O2 prn    Active Problems:  Nausea/vomiting (POA)  - likely due to above  - seems to have chronic nausea as she takes Zofran prn at home  - prn antiemetic    H/o PE nonadherent to warfarin   - per pharmacy, had not taken warfarin in many days; will request pharmacy to dose  - check INR  - due to SOB, check V/Q scan      Lupus (POA)  - follows with Dr Leonides Beltrán  - continue Plaquenil and daily prednisone      ESRD on peritoneal dialysis [facility])   - consult Dr Hugh Lan for PD orders  - of note, CMP is still not available at this time      Anemia of renal disease - H/H better than previous; no indication for transfusion at this time      Hypertension, uncontrolled (POA)  - uncontrolled likely due to inability to hold down meds  - resume home meds and add IV hydralazine prn    Diet: CLD ADAT cardiac  Activity: ambulate with assistance  DVT prophylaxis: warfarin with pharmacy dosing  Isolation precautions: none  Consultations: Renal  Anticipated disposition: TBD, maybe 3-4 nights       Signed by: Deepika Can MD    October 14, 2017 at 5:09 PM

## 2017-10-14 NOTE — ROUTINE PROCESS
TRANSFER - OUT REPORT:    Verbal report given to Padma BOSCH(name) on Angella Jimenez  being transferred to (unit) for routine progression of care       Report consisted of patients Situation, Background, Assessment and   Recommendations(SBAR). Information from the following report(s) SBAR, ED Summary, Intake/Output, MAR, Recent Results and Cardiac Rhythm SR was reviewed with the receiving nurse. Lines:   Peripheral IV 10/14/17 Left Antecubital (Active)   Site Assessment Clean, dry, & intact 10/14/2017  4:30 PM   Phlebitis Assessment 0 10/14/2017  4:30 PM   Infiltration Assessment 0 10/14/2017  4:30 PM   Dressing Status Clean, dry, & intact 10/14/2017  4:30 PM   Dressing Type Transparent 10/14/2017  4:30 PM   Hub Color/Line Status Patent; Flushed 10/14/2017  4:30 PM   Action Taken Blood drawn 10/14/2017  4:30 PM        Opportunity for questions and clarification was provided.       Patient transported with:  RN on cardiac monitor

## 2017-10-15 LAB
ALBUMIN SERPL-MCNC: 1.4 G/DL (ref 3.5–5)
ALBUMIN/GLOB SERPL: 0.3 {RATIO} (ref 1.1–2.2)
ALP SERPL-CCNC: 48 U/L (ref 45–117)
ALT SERPL-CCNC: 17 U/L (ref 12–78)
ANION GAP SERPL CALC-SCNC: 15 MMOL/L (ref 5–15)
AST SERPL-CCNC: 25 U/L (ref 15–37)
BASOPHILS # BLD: 0 K/UL (ref 0–0.1)
BASOPHILS NFR BLD: 0 % (ref 0–1)
BILIRUB SERPL-MCNC: 0.3 MG/DL (ref 0.2–1)
BUN SERPL-MCNC: 29 MG/DL (ref 6–20)
BUN/CREAT SERPL: 3 (ref 12–20)
CALCIUM SERPL-MCNC: 7.6 MG/DL (ref 8.5–10.1)
CHLORIDE SERPL-SCNC: 97 MMOL/L (ref 97–108)
CO2 SERPL-SCNC: 22 MMOL/L (ref 21–32)
CREAT SERPL-MCNC: 9.84 MG/DL (ref 0.55–1.02)
EOSINOPHIL # BLD: 0 K/UL (ref 0–0.4)
EOSINOPHIL NFR BLD: 1 % (ref 0–7)
ERYTHROCYTE [DISTWIDTH] IN BLOOD BY AUTOMATED COUNT: 15.6 % (ref 11.5–14.5)
GLOBULIN SER CALC-MCNC: 4.4 G/DL (ref 2–4)
GLUCOSE SERPL-MCNC: 93 MG/DL (ref 65–100)
HCT VFR BLD AUTO: 29.8 % (ref 35–47)
HGB BLD-MCNC: 9.9 G/DL (ref 11.5–16)
INR PPP: 1.3 (ref 0.9–1.1)
LYMPHOCYTES # BLD: 1.1 K/UL (ref 0.8–3.5)
LYMPHOCYTES NFR BLD: 37 % (ref 12–49)
MAGNESIUM SERPL-MCNC: 1.4 MG/DL (ref 1.6–2.4)
MCH RBC QN AUTO: 30.1 PG (ref 26–34)
MCHC RBC AUTO-ENTMCNC: 33.2 G/DL (ref 30–36.5)
MCV RBC AUTO: 90.6 FL (ref 80–99)
MONOCYTES # BLD: 0.2 K/UL (ref 0–1)
MONOCYTES NFR BLD: 8 % (ref 5–13)
NEUTS SEG # BLD: 1.6 K/UL (ref 1.8–8)
NEUTS SEG NFR BLD: 54 % (ref 32–75)
PHOSPHATE SERPL-MCNC: 4.6 MG/DL (ref 2.6–4.7)
PLATELET # BLD AUTO: 178 K/UL (ref 150–400)
POTASSIUM SERPL-SCNC: 3.2 MMOL/L (ref 3.5–5.1)
PROT SERPL-MCNC: 5.8 G/DL (ref 6.4–8.2)
PROTHROMBIN TIME: 13.2 SEC (ref 9–11.1)
RBC # BLD AUTO: 3.29 M/UL (ref 3.8–5.2)
SODIUM SERPL-SCNC: 134 MMOL/L (ref 136–145)
WBC # BLD AUTO: 3 K/UL (ref 3.6–11)

## 2017-10-15 PROCEDURE — 65270000032 HC RM SEMIPRIVATE

## 2017-10-15 PROCEDURE — 74011250636 HC RX REV CODE- 250/636: Performed by: INTERNAL MEDICINE

## 2017-10-15 PROCEDURE — 85610 PROTHROMBIN TIME: CPT | Performed by: INTERNAL MEDICINE

## 2017-10-15 PROCEDURE — A4722 DIALYS SOL FLD VOL > 1999CC: HCPCS | Performed by: INTERNAL MEDICINE

## 2017-10-15 PROCEDURE — 74011250637 HC RX REV CODE- 250/637: Performed by: INTERNAL MEDICINE

## 2017-10-15 PROCEDURE — 85025 COMPLETE CBC W/AUTO DIFF WBC: CPT | Performed by: INTERNAL MEDICINE

## 2017-10-15 PROCEDURE — 93306 TTE W/DOPPLER COMPLETE: CPT

## 2017-10-15 PROCEDURE — 74011636637 HC RX REV CODE- 636/637: Performed by: INTERNAL MEDICINE

## 2017-10-15 PROCEDURE — 84100 ASSAY OF PHOSPHORUS: CPT | Performed by: INTERNAL MEDICINE

## 2017-10-15 PROCEDURE — 80053 COMPREHEN METABOLIC PANEL: CPT | Performed by: INTERNAL MEDICINE

## 2017-10-15 PROCEDURE — 36415 COLL VENOUS BLD VENIPUNCTURE: CPT | Performed by: INTERNAL MEDICINE

## 2017-10-15 PROCEDURE — 83735 ASSAY OF MAGNESIUM: CPT | Performed by: INTERNAL MEDICINE

## 2017-10-15 RX ORDER — MAGNESIUM SULFATE HEPTAHYDRATE 40 MG/ML
2 INJECTION, SOLUTION INTRAVENOUS ONCE
Status: COMPLETED | OUTPATIENT
Start: 2017-10-15 | End: 2017-10-15

## 2017-10-15 RX ORDER — WARFARIN 2 MG/1
4 TABLET ORAL ONCE
Status: COMPLETED | OUTPATIENT
Start: 2017-10-15 | End: 2017-10-15

## 2017-10-15 RX ORDER — PANTOPRAZOLE SODIUM 40 MG/1
40 TABLET, DELAYED RELEASE ORAL
Status: DISCONTINUED | OUTPATIENT
Start: 2017-10-15 | End: 2017-10-17 | Stop reason: HOSPADM

## 2017-10-15 RX ORDER — CALCIUM CARBONATE 200(500)MG
200 TABLET,CHEWABLE ORAL
Status: DISCONTINUED | OUTPATIENT
Start: 2017-10-15 | End: 2017-10-17 | Stop reason: HOSPADM

## 2017-10-15 RX ORDER — POTASSIUM CHLORIDE 750 MG/1
40 TABLET, FILM COATED, EXTENDED RELEASE ORAL 2 TIMES DAILY
Status: COMPLETED | OUTPATIENT
Start: 2017-10-15 | End: 2017-10-15

## 2017-10-15 RX ADMIN — POTASSIUM CHLORIDE 10 MEQ: 10 INJECTION, SOLUTION INTRAVENOUS at 09:11

## 2017-10-15 RX ADMIN — CALCIUM CARBONATE (ANTACID) CHEW TAB 500 MG 200 MG: 500 CHEW TAB at 20:51

## 2017-10-15 RX ADMIN — POTASSIUM CHLORIDE 40 MEQ: 750 TABLET, FILM COATED, EXTENDED RELEASE ORAL at 19:22

## 2017-10-15 RX ADMIN — Medication 10 ML: at 22:24

## 2017-10-15 RX ADMIN — SODIUM CHLORIDE, SODIUM LACTATE, CALCIUM CHLORIDE, MAGNESIUM CHLORIDE AND DEXTROSE 2500 ML: 2.5; 538; 448; 18.3; 5.08 INJECTION, SOLUTION INTRAPERITONEAL at 05:00

## 2017-10-15 RX ADMIN — Medication 2500 MCG: at 12:09

## 2017-10-15 RX ADMIN — HYDROXYCHLOROQUINE SULFATE 200 MG: 200 TABLET, FILM COATED ORAL at 19:22

## 2017-10-15 RX ADMIN — SODIUM CHLORIDE, SODIUM LACTATE, CALCIUM CHLORIDE, MAGNESIUM CHLORIDE AND DEXTROSE 2500 ML: 1.5; 538; 448; 18.3; 5.08 INJECTION, SOLUTION INTRAPERITONEAL at 09:19

## 2017-10-15 RX ADMIN — AMLODIPINE BESYLATE 10 MG: 5 TABLET ORAL at 09:31

## 2017-10-15 RX ADMIN — PANTOPRAZOLE SODIUM 40 MG: 40 TABLET, DELAYED RELEASE ORAL at 15:28

## 2017-10-15 RX ADMIN — HYDROXYCHLOROQUINE SULFATE 200 MG: 200 TABLET, FILM COATED ORAL at 09:32

## 2017-10-15 RX ADMIN — OXYCODONE HYDROCHLORIDE 5 MG: 5 TABLET ORAL at 22:23

## 2017-10-15 RX ADMIN — SODIUM CHLORIDE, SODIUM LACTATE, CALCIUM CHLORIDE, MAGNESIUM CHLORIDE AND DEXTROSE 2500 ML: 1.5; 538; 448; 18.3; 5.08 INJECTION, SOLUTION INTRAPERITONEAL at 00:45

## 2017-10-15 RX ADMIN — ALLOPURINOL 100 MG: 100 TABLET ORAL at 09:32

## 2017-10-15 RX ADMIN — LOSARTAN POTASSIUM 50 MG: 50 TABLET ORAL at 09:31

## 2017-10-15 RX ADMIN — POTASSIUM CHLORIDE 10 MEQ: 10 INJECTION, SOLUTION INTRAVENOUS at 02:00

## 2017-10-15 RX ADMIN — SODIUM CHLORIDE, SODIUM LACTATE, CALCIUM CHLORIDE, MAGNESIUM CHLORIDE AND DEXTROSE 2500 ML: 1.5; 538; 448; 18.3; 5.08 INJECTION, SOLUTION INTRAPERITONEAL at 14:27

## 2017-10-15 RX ADMIN — CALCITRIOL 0.25 MCG: 0.25 CAPSULE, LIQUID FILLED ORAL at 09:30

## 2017-10-15 RX ADMIN — SODIUM CHLORIDE, SODIUM LACTATE, CALCIUM CHLORIDE, MAGNESIUM CHLORIDE AND DEXTROSE 2500 ML: 1.5; 538; 448; 18.3; 5.08 INJECTION, SOLUTION INTRAPERITONEAL at 23:12

## 2017-10-15 RX ADMIN — CLONIDINE HYDROCHLORIDE 0.3 MG: 0.2 TABLET ORAL at 09:32

## 2017-10-15 RX ADMIN — MAGNESIUM SULFATE HEPTAHYDRATE 2 G: 40 INJECTION, SOLUTION INTRAVENOUS at 12:09

## 2017-10-15 RX ADMIN — OXYCODONE HYDROCHLORIDE 5 MG: 5 TABLET ORAL at 02:19

## 2017-10-15 RX ADMIN — PREDNISONE 9 MG: 5 TABLET ORAL at 08:10

## 2017-10-15 RX ADMIN — WARFARIN SODIUM 4 MG: 2 TABLET ORAL at 19:22

## 2017-10-15 RX ADMIN — OXYCODONE HYDROCHLORIDE 5 MG: 5 TABLET ORAL at 09:39

## 2017-10-15 RX ADMIN — SODIUM CHLORIDE, SODIUM LACTATE, CALCIUM CHLORIDE, MAGNESIUM CHLORIDE AND DEXTROSE 2500 ML: 1.5; 538; 448; 18.3; 5.08 INJECTION, SOLUTION INTRAPERITONEAL at 19:07

## 2017-10-15 RX ADMIN — POTASSIUM CHLORIDE 10 MEQ: 10 INJECTION, SOLUTION INTRAVENOUS at 03:21

## 2017-10-15 RX ADMIN — METOPROLOL SUCCINATE 100 MG: 50 TABLET, EXTENDED RELEASE ORAL at 09:30

## 2017-10-15 RX ADMIN — POTASSIUM CHLORIDE 40 MEQ: 750 TABLET, FILM COATED, EXTENDED RELEASE ORAL at 12:09

## 2017-10-15 RX ADMIN — Medication 10 ML: at 14:00

## 2017-10-15 NOTE — PROGRESS NOTES
Hospitalist Progress Note  Claire Jones DO  Office: [phone]        Date of Service:  10/15/2017  NAME:  Kirill Blackwell  :  1985  MRN:  [id number]      Admission Summary:    33 y.o. female with h/o PE on warfarin, ESRD on PD, lupus, anemia, lumbar DDD, hyperlipidemia, chronic pain, chronic LE edema, and HTN who presents to the ED from home on 10/14/17 with PNA, nausea, and vomiting. Patient states chills, cough, congestion, SOB started about 2 weeks ago and were not getting better so she went to [facility] ED yesterday. She had a CXR but no labs, was told she had pneumonia, given a dose of cefdinir in the ED then discharged from the ED with a script for cefdinir. She states she should have been admitted so she came to [facility] ED today. She reports getting her flu shot in September this year and thinks she may have had a PNA vaccine about 5 years ago. She c/o chest discomfort with coughing, cough productive of yellow sputum, SOB with exertion, and generalized body aches. She states that she last vomited yesterday, has been unable to take her meds or even hold down water due to the nausea. She reports adherence to PD and no complications with the catheter or PD itself. She denies weight changes, diarrhea, dysuria but only urinates about once every couple of days. She was admitted for further evaluation and treatment of her CAP. Interval history / Subjective:   Patient seen and examined this morning with the nurse, Corrinne France, present. She has no complaints other then dry lips. She feels better then on admission and admits to continued fatigue and being tired. Denies headache, dizziness, lightheadedness, cp, palpations, sob, bearden, cough, abd pain, constipation, diarrhea, fever, chills, nausea, vomiting. Assessment & Plan:     1. CAP: Blood cultures pending, legionella pending. Continue with levofloxacin renally dosed. NC O2 as needed. IF does not continue to improve, give IS, flutter add duo nebs. 2. Nausea: appears resolved currently. Likely secondary to #1. Monitor. 3. ESRD: on PD, nephrology consulted, appreciate input. 4. Anemia: secondary to renal disease. H/H stable, no need for transfusion at this time. Monitor. 5. HTN: controlled. Continue current management. 6. Hypokalemia: given replacement. Will monitor, has ESRD likely to increase on its own. 7. Lupus: continue with warfarin, plaquinel, prednisone. INR subtherapeurtic, pharmacy dosing. Code status: full  DVT prophylaxis: warfarin    Care Plan discussed with: Patient/Family and Nurse  Disposition: Home w/Family and TBD     Hospital Problems  Date Reviewed: 10/14/2017          Codes Class Noted POA    * (Principal)Community acquired pneumonia of left lower lobe of lung ([facility] ICD-10-CM: J18.1  ICD-9-CM: 377  10/14/2017 Yes        Pleural effusion, left ICD-10-CM: J90  ICD-9-CM: 511.9  10/14/2017 Yes        Hypertension (Chronic) ICD-10-CM: I10  ICD-9-CM: 401.9  10/14/2017 Yes        Anemia of renal disease ICD-10-CM: D63.1  ICD-9-CM: 285.21  2/21/2017 Yes        ESRD on peritoneal dialysis ([facility] (Chronic) ICD-10-CM: N18.6, Z99.2  ICD-9-CM: 585.6, V45.11  10/7/2016 Yes        Lupus ICD-10-CM: L93.0  ICD-9-CM: 695.4  2/27/2012 Yes                Review of Systems:   A comprehensive review of systems was negative except for that written in the HPI. Vital Signs:    Last 24hrs VS reviewed since prior progress note.  Most recent are:  Visit Vitals    /82 (BP 1 Location: Right arm)    Pulse 75    Temp 96.6 °F (35.9 °C)    Resp 18    Wt 69.4 kg (153 lb 1.6 oz)    SpO2 96%    BMI 25.48 kg/m2         Intake/Output Summary (Last 24 hours) at 10/15/17 1429  Last data filed at 10/15/17 0500   Gross per 24 hour   Intake             5000 ml   Output             4200 ml   Net              800 ml        Physical Examination:             Constitutional:  No acute distress, cooperative, pleasant, appears tired, sleepy   ENT:  Oral mucous moist, oropharynx benign. Very dry lips, continuous to moisten lips with tongue. Resp:  CTA bilaterally. No wheezing/rhonchi/rales. No accessory muscle use   CV:  Regular rhythm, normal rate, no murmurs, gallops, rubs    GI:  Soft, non distended, non tender. normoactive bowel sounds, no hepatosplenomegaly. PD port    Musculoskeletal:  No edema, warm, 2+ pulses throughout    Neurologic:  Moves all extremities. AAOx3, CN II-XII reviewed     Psych:  Good insight, Not anxious nor agitated. Although question if she appreciates her overall condition. Seems ambivalent to situation. Skin:  Good turgor, no rashes or ulcers  Hematologic/Lymphatic/Immunlogic:  No jaundice nor lymph node swelling  Eyes:  EOMI. Anicteric sclerae       Data Review:    Review and/or order of clinical lab test      Labs:     Recent Labs      10/15/17   0600  10/14/17   1538   WBC  3.0*  4.2   HGB  9.9*  11.1*   HCT  29.8*  34.0*   PLT  178  244     Recent Labs      10/15/17   0600  10/14/17   2205  10/14/17   1538   NA  134*   --   134*   K  3.2*  2.2*  2.6*   CL  97   --   95*   CO2  22   --   30   BUN  29*   --   28*   CREA  9.84*   --   9.68*   GLU  93   --   94   CA  7.6*   --   8.6   MG  1.4*   --   1.4*   PHOS  4.6   --   4.4     Recent Labs      10/15/17   0600  10/14/17   1538   SGOT  25  39*   ALT  17  20   AP  48  58   TBILI  0.3  0.4   TP  5.8*  7.0   ALB  1.4*  1.7*   GLOB  4.4*  5.3*     Recent Labs      10/15/17   0600  10/14/17   1538   INR  1.3*  1.2*   PTP  13.2*  11.9*      No results for input(s): FE, TIBC, PSAT, FERR in the last 72 hours. Lab Results   Component Value Date/Time    Folate 6.4 12/11/2015 06:38 PM      No results for input(s): PH, PCO2, PO2 in the last 72 hours. No results for input(s): CPK, CKNDX, TROIQ in the last 72 hours.     No lab exists for component: CPKMB  Lab Results   Component Value Date/Time    Cholesterol, total 117 09/25/2015 02:02 PM    HDL Cholesterol 31 09/25/2015 02:02 PM    LDL, calculated 57 09/25/2015 02:02 PM    Triglyceride 146 09/25/2015 02:02 PM    CHOL/HDL Ratio 7.7 05/31/2009 10:30 AM     Lab Results   Component Value Date/Time    Glucose (POC) 95 09/16/2015 12:08 PM    Glucose (POC) 116 09/03/2013 06:20 AM    Glucose (POC) 151 09/02/2013 09:07 PM    Glucose (POC) 162 09/02/2013 04:57 PM    Glucose (POC) 133 09/02/2013 11:58 AM    Glucose (POC) 203 09/01/2013 11:02 PM     Lab Results   Component Value Date/Time    Color YELLOW/STRAW 08/12/2016 09:06 PM    Appearance CLEAR 08/12/2016 09:06 PM    Specific gravity 1.017 08/12/2016 09:06 PM    Specific gravity 1.010 07/11/2016 12:44 PM    pH (UA) 7.0 08/12/2016 09:06 PM    Protein 300 08/12/2016 09:06 PM    Glucose NEGATIVE  08/12/2016 09:06 PM    Ketone TRACE 08/12/2016 09:06 PM    Bilirubin NEGATIVE  07/11/2016 12:44 PM    Urobilinogen 1.0 08/12/2016 09:06 PM    Nitrites NEGATIVE  08/12/2016 09:06 PM    Leukocyte Esterase NEGATIVE  08/12/2016 09:06 PM    Epithelial cells MODERATE 08/12/2016 09:06 PM    Bacteria 1+ 08/12/2016 09:06 PM    WBC 0-4 08/12/2016 09:06 PM    RBC 0-5 08/12/2016 09:06 PM         Medications Reviewed:     Current Facility-Administered Medications   Medication Dose Route Frequency    warfarin (COUMADIN) tablet 4 mg  4 mg Oral ONCE    potassium chloride SR (KLOR-CON 10) tablet 40 mEq  40 mEq Oral BID    peritoneal dialysis DEXTROSE 1.5% (2.5 mEq/L low calcium) solution 2,500 mL  2,500 mL IntraPERitoneal Q4H    allopurinol (ZYLOPRIM) tablet 100 mg  100 mg Oral DAILY    amLODIPine (NORVASC) tablet 10 mg  10 mg Oral DAILY    calcitRIOL (ROCALTROL) capsule 0.25 mcg  0.25 mcg Oral DAILY    cloNIDine HCl (CATAPRES) tablet 0.3 mg  0.3 mg Oral BID    cyanocobalamin (VITAMIN B12) tablet 2,500 mcg  2,500 mcg Oral DAILY    [START ON 10/19/2017] ergocalciferol (ERGOCALCIFEROL) capsule 50,000 Units  50,000 Units Oral Q7D    hydroxychloroquine (PLAQUENIL) tablet 200 mg  200 mg Oral BID    losartan (COZAAR) tablet 50 mg  50 mg Oral DAILY    metoprolol succinate (TOPROL-XL) XL tablet 100 mg  100 mg Oral DAILY    oxyCODONE IR (ROXICODONE) tablet 5 mg  5 mg Oral Q4H PRN    predniSONE (DELTASONE) tablet 9 mg  9 mg Oral DAILY WITH BREAKFAST    senna (SENOKOT) tablet 8.6 mg  1 Tab Oral DAILY PRN    . PHARMACY TO SUBSTITUTE PER PROTOCOL 1 Each  1 Each Oral Per Protocol    sodium chloride (NS) flush 5-10 mL  5-10 mL IntraVENous Q8H    sodium chloride (NS) flush 5-10 mL  5-10 mL IntraVENous PRN    ondansetron (ZOFRAN) injection 4 mg  4 mg IntraVENous Q4H PRN    Warfarin pharmacy to dose   Other Rx Dosing/Monitoring    [START ON 10/16/2017] levoFLOXacin (LEVAQUIN) 500 mg in D5W IVPB  500 mg IntraVENous Q48H    hydrALAZINE (APRESOLINE) 20 mg/mL injection 10 mg  10 mg IntraVENous Q6H PRN     ______________________________________________________________________  EXPECTED LENGTH OF STAY: - - -  ACTUAL LENGTH OF STAY:          [address],

## 2017-10-15 NOTE — CONSULTS
Assessment:  ESRD: CCPD-> CAPD while inpt  LLL PNA  Hypokalemia/Hypomagnesemia  HTN: Uncontrolled-> improved  PE: INR sub-therapeutic  Lupus: on plaquinil/azathioprine/prednisone  Anemia: Hgb stable  SHPT: Phos stable    Plan/Recommendations:  CAPD-> change PD bags to 1. 5%Dextrose PD bags  Oral KCl 40meq x2 doses  IV mag sulfate 2gm x1 dose  IV abx per hospitalist team  Am labs    Discussed with patient and RN    Thanks for the consultation. Renal service will follow patient with you. Please contact me with any questions or concerns. Initial Consult note         Patient name: Tutu Dasilva  MR no: [id number]  Date of admission: 10/14/2017  Date of consultation: 10/15/2017  Requested by: Dr. Jeanine Casanova  Reason for consult: ESRD on PD    Patient seen and examined. History obtained from patient and chart review. Relevant labs, data and notes reviewed. HPI: Tutu Dasilva is a 33 y.o. female with PMH significant for ESRD on CCPD, Lupus nephritis, HTN, PE on coumadin, Anemia,SHPT presented yesterday with SOB/productive cough. CXR c/w LLL PNA. Started on IV Abx. Nephrology consulted to resume ESRD management. Started on CAPD last night. +Hypokalemia-> supplemented overnight. No fevers/+chills. Mild n/v-> improved today. SOB improving. PD fluid clear. Exit site clean.  No abd pain    PMH:  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 ([address]     Peritoneal dialysis status ([address] 10/2015    Thromboembolus ([facility] 2013    lungs     PSH:  Past Surgical History:   Procedure Laterality Date    HX  SECTION  2006    HX OTHER SURGICAL  9/16/15    INSERTION PD CATH       Social history:   Social History   Substance Use Topics    Smoking status: Never Smoker    Smokeless tobacco: Never Used    Alcohol use No       Family history:  Not contributory    Allergies   Allergen Reactions    Compazine [Prochlorperazine Edisylate] Nausea and Vomiting and Palpitations    Fish Containing Products Rash     An itchy rash appeared in about 1 hour       Current Facility-Administered Medications   Medication Dose Route Frequency Last Dose    warfarin (COUMADIN) tablet 4 mg  4 mg Oral ONCE      magnesium sulfate 2 g/50 ml IVPB (premix or compounded)  2 g IntraVENous ONCE      allopurinol (ZYLOPRIM) tablet 100 mg  100 mg Oral DAILY 100 mg at 10/15/17 0932    amLODIPine (NORVASC) tablet 10 mg  10 mg Oral DAILY 10 mg at 10/15/17 0931    calcitRIOL (ROCALTROL) capsule 0.25 mcg  0.25 mcg Oral DAILY 0.25 mcg at 10/15/17 0930    cloNIDine HCl (CATAPRES) tablet 0.3 mg  0.3 mg Oral BID 0.3 mg at 10/15/17 0932    cyanocobalamin (VITAMIN B12) tablet 2,500 mcg  2,500 mcg Oral DAILY      [START ON 10/19/2017] ergocalciferol (ERGOCALCIFEROL) capsule 50,000 Units  50,000 Units Oral Q7D      hydroxychloroquine (PLAQUENIL) tablet 200 mg  200 mg Oral  mg at 10/15/17 0932    losartan (COZAAR) tablet 50 mg  50 mg Oral DAILY 50 mg at 10/15/17 0931    metoprolol succinate (TOPROL-XL) XL tablet 100 mg  100 mg Oral DAILY 100 mg at 10/15/17 0930    oxyCODONE IR (ROXICODONE) tablet 5 mg  5 mg Oral Q4H PRN 5 mg at 10/15/17 0939    predniSONE (DELTASONE) tablet 9 mg  9 mg Oral DAILY WITH BREAKFAST 9 mg at 10/15/17 0810    senna (SENOKOT) tablet 8.6 mg  1 Tab Oral DAILY PRN      . PHARMACY TO SUBSTITUTE PER PROTOCOL 1 Each  1 Each Oral Per Protocol      sodium chloride (NS) flush 5-10 mL  5-10 mL IntraVENous Q8H      sodium chloride (NS) flush 5-10 mL  5-10 mL IntraVENous PRN      ondansetron (ZOFRAN) injection 4 mg  4 mg IntraVENous Q4H PRN      Warfarin pharmacy to dose   Other Rx Dosing/Monitoring      [START ON 10/16/2017] levoFLOXacin (LEVAQUIN) 500 mg in D5W IVPB  500 mg IntraVENous Q48H      peritoneal dialysis DEXTROSE 1.5% (2.5 mEq/L low calcium) solution 2,500 mL  2,500 mL IntraPERitoneal Q8H 2,500 mL at 10/15/17 0919    peritoneal dialysis DEXTROSE 2.5% (2.5 mEq/L low calcium) solution 2,500 mL  2,500 mL IntraPERitoneal Q12H 2,500 mL at 10/15/17 0500    hydrALAZINE (APRESOLINE) 20 mg/mL injection 10 mg  10 mg IntraVENous Q6H PRN         ROS (besides HPI):    General: No fever. +chills. No weight changes  ENT: No hearing loss or visual changes  Cardiovascular: No Chest pain  Pulmonary: +SOB. +productive cough  GI: No abdominal pain.+ Nausea/Vomiting. No Diarrhea. No blood in stool  : No blood in urine. No foamy or cloudy urine  Musculoskeletal: No joint swelling or redness. No morning stiffness  Endocrine: no cold or heat intolerance  Psych: denies anxiety or depression  Neuro: No light headedness or dizziness    Objective   Visit Vitals    /82 (BP 1 Location: Right arm)    Pulse 75    Temp 96.6 °F (35.9 °C)    Resp 18    Wt 69.4 kg (153 lb 1.6 oz)    SpO2 96%    BMI 25.48 kg/m2       Physical Exam:    Gen: NAD    HEENT: AT/NC, EOMI, moist mucous membrane, no scleral icterus    Neck: no JVD, no cervical lymphadenopathy, no carotid bruit    Lungs/Chest wall: Breath sounds normal. Symmetrical chest wall expansion. No accessory muscle use. Clear to auscultation    Cardiovascular: Normal S1/S2, normal rate, regular rhythm. Abdomen: soft, NT, ND, +PD catheter    Ext: no clubbing or cyanosis. No edema    Skin: warm and dry. No rashes    : no CVA tenderness    CNS: alert awake. Answers appropriately.      Labs/Data:    Lab Results   Component Value Date/Time    Sodium 134 10/15/2017 06:00 AM    Potassium 3.2 10/15/2017 06:00 AM    Chloride 97 10/15/2017 06:00 AM    CO2 22 10/15/2017 06:00 AM    Anion gap 15 10/15/2017 06:00 AM    Glucose 93 10/15/2017 06:00 AM    BUN 29 10/15/2017 06:00 AM    Creatinine 9.84 10/15/2017 06:00 AM    BUN/Creatinine ratio 3 10/15/2017 06:00 AM    GFR est AA 6 10/15/2017 06:00 AM    GFR est non-AA 5 10/15/2017 06:00 AM    Calcium 7.6 10/15/2017 06:00 AM       Lab Results   Component Value Date/Time    WBC 3.0 10/15/2017 06:00 AM    Hemoglobin (POC) 11.2 09/16/2015 12:08 PM    HGB 9.9 10/15/2017 06:00 AM    Hematocrit (POC) 33 09/16/2015 12:08 PM    HCT 29.8 10/15/2017 06:00 AM    PLATELET 287 71/55/5205 06:00 AM    MCV 90.6 10/15/2017 06:00 AM       Urine analysis: No results found for this or any previous visit.         No components found for: SPEP, UPEP  Lab Results   Component Value Date/Time    Protein,urine 24 hr 2071.00 05/30/2009 10:00 PM    Total protein 7.30 07/27/2009 03:30 AM     Lab Results   Component Value Date/Time    Microalbumin/Creat ratio (mg/g creat) 1015 09/22/2009 06:30 PM    Microalb/Creat ratio (ug/mg creat.) 2443.5 03/07/2014 02:16 PM    Microalbumin,urine random 250.86 09/22/2009 06:30 PM         Intake/Output Summary (Last 24 hours) at 10/15/17 1108  Last data filed at 10/15/17 0500   Gross per 24 hour   Intake             5000 ml   Output             4200 ml   Net              800 ml       Wt Readings from Last 3 Encounters:   10/15/17 69.4 kg (153 lb 1.6 oz)   10/13/17 70.8 kg (156 lb)   09/15/17 83 kg (183 lb)       Signed by:  Don Knapp MD  Nephrology and Hypertension  [facility]

## 2017-10-15 NOTE — PROGRESS NOTES
2130:  Patient complaining of 8/10 generalized pain \"everywhere\". Patient states PRN dose of roxicodone ineffective. Written orders also received for 4 runs of K from Dr. Cherry Pickens despite patient receiving 40 mEq PO K at 1906. Hospitalist on-call paged. 2145:  Dr. Kathleen Bell notified. Order received to draw a K lab and hold K runs until resulted. 2320:  Lab called to report critical K of 2.2. Dr. Kathleen Bell paged. 2330:  Orders received to give IV KCL.

## 2017-10-15 NOTE — PROGRESS NOTES
Pharmacist Note - Warfarin Dosing  Consult provided for this 33 y. o.female to manage warfarin for h/o VTE     INR Goal: 2 - 3    Home regimen/ tablet size: 2.5 mg daily (has not taken for about 2 weeks)    Drugs that may increase INR: Levaquin, allopurinol  Drugs that may decrease INR: None  Other current anticoagulants/ drugs that may increase bleeding risk: None  Risk factors:  ESRD  Daily INR ordered: YES    Recent Labs      10/15/17   0600  10/14/17   1538   HGB  9.9*  11.1*   INR  1.3*  1.2*     Date               INR                  Dose  10/14  1.2  4 mg  10/15               1.3                  4 mg                                                                               Assessment/ Plan: Will order warfarin 4 mg PO x 1 dose. Pharmacy will continue to monitor daily and adjust therapy as indicated. Please contact the pharmacist at   or [phone] for outpatient recommendations if needed.

## 2017-10-15 NOTE — PROGRESS NOTES
Bedside shift change report given to Deisi Marrero RN (oncoming nurse) by Saul Bernabe RN (offgoing nurse). Report included the following information SBAR, Intake/Output and MAR.

## 2017-10-15 NOTE — PROGRESS NOTES
Spiritual Care Partner Volunteer visited patient in [facility] on 10/15/17.   Documented by:  Chaplain Frank MDiv, MS, [address] ([id number])

## 2017-10-15 NOTE — CONSULTS
Received consult for ESRD management. Patients chart reviewed. Well known to our service. ESRD on CCPD, Lupus, PE,HTN admitted with LLL PNA. No symptoms to suggest peritonitis. Labs reviewed. CAPD orders placed. Oral KCL 40meq x1 dose ordered. Discussed with RN. Will staff formal consult in the morning.      Fabricio Feliz MD  [facility]

## 2017-10-16 ENCOUNTER — APPOINTMENT (OUTPATIENT)
Dept: NUCLEAR MEDICINE | Age: 32
DRG: 193 | End: 2017-10-16
Attending: INTERNAL MEDICINE
Payer: MEDICARE

## 2017-10-16 LAB
ALBUMIN SERPL-MCNC: 1.5 G/DL (ref 3.5–5)
ALBUMIN/GLOB SERPL: 0.3 {RATIO} (ref 1.1–2.2)
ALP SERPL-CCNC: 49 U/L (ref 45–117)
ALT SERPL-CCNC: 13 U/L (ref 12–78)
ANION GAP SERPL CALC-SCNC: 12 MMOL/L (ref 5–15)
AST SERPL-CCNC: 18 U/L (ref 15–37)
BILIRUB SERPL-MCNC: 0.2 MG/DL (ref 0.2–1)
BUN SERPL-MCNC: 25 MG/DL (ref 6–20)
BUN/CREAT SERPL: 3 (ref 12–20)
CALCIUM SERPL-MCNC: 8 MG/DL (ref 8.5–10.1)
CHLORIDE SERPL-SCNC: 97 MMOL/L (ref 97–108)
CO2 SERPL-SCNC: 26 MMOL/L (ref 21–32)
CREAT SERPL-MCNC: 8.85 MG/DL (ref 0.55–1.02)
ERYTHROCYTE [DISTWIDTH] IN BLOOD BY AUTOMATED COUNT: 15.7 % (ref 11.5–14.5)
GLOBULIN SER CALC-MCNC: 4.4 G/DL (ref 2–4)
GLUCOSE SERPL-MCNC: 89 MG/DL (ref 65–100)
HCT VFR BLD AUTO: 29.1 % (ref 35–47)
HGB BLD-MCNC: 9.8 G/DL (ref 11.5–16)
INR PPP: 1.4 (ref 0.9–1.1)
MAGNESIUM SERPL-MCNC: 2 MG/DL (ref 1.6–2.4)
MCH RBC QN AUTO: 30.3 PG (ref 26–34)
MCHC RBC AUTO-ENTMCNC: 33.7 G/DL (ref 30–36.5)
MCV RBC AUTO: 90.1 FL (ref 80–99)
PHOSPHATE SERPL-MCNC: 3.7 MG/DL (ref 2.6–4.7)
PLATELET # BLD AUTO: 192 K/UL (ref 150–400)
POTASSIUM SERPL-SCNC: 2.8 MMOL/L (ref 3.5–5.1)
PROT SERPL-MCNC: 5.9 G/DL (ref 6.4–8.2)
PROTHROMBIN TIME: 14 SEC (ref 9–11.1)
RBC # BLD AUTO: 3.23 M/UL (ref 3.8–5.2)
SODIUM SERPL-SCNC: 135 MMOL/L (ref 136–145)
WBC # BLD AUTO: 3.9 K/UL (ref 3.6–11)

## 2017-10-16 PROCEDURE — 74011250637 HC RX REV CODE- 250/637: Performed by: INTERNAL MEDICINE

## 2017-10-16 PROCEDURE — 80053 COMPREHEN METABOLIC PANEL: CPT | Performed by: INTERNAL MEDICINE

## 2017-10-16 PROCEDURE — 83735 ASSAY OF MAGNESIUM: CPT | Performed by: INTERNAL MEDICINE

## 2017-10-16 PROCEDURE — 74011250636 HC RX REV CODE- 250/636: Performed by: INTERNAL MEDICINE

## 2017-10-16 PROCEDURE — 85610 PROTHROMBIN TIME: CPT | Performed by: INTERNAL MEDICINE

## 2017-10-16 PROCEDURE — 36415 COLL VENOUS BLD VENIPUNCTURE: CPT | Performed by: INTERNAL MEDICINE

## 2017-10-16 PROCEDURE — 65270000032 HC RM SEMIPRIVATE

## 2017-10-16 PROCEDURE — 74011636637 HC RX REV CODE- 636/637: Performed by: INTERNAL MEDICINE

## 2017-10-16 PROCEDURE — 84100 ASSAY OF PHOSPHORUS: CPT | Performed by: INTERNAL MEDICINE

## 2017-10-16 PROCEDURE — 85027 COMPLETE CBC AUTOMATED: CPT | Performed by: INTERNAL MEDICINE

## 2017-10-16 PROCEDURE — A9558 XE133 XENON 10MCI: HCPCS

## 2017-10-16 PROCEDURE — A4722 DIALYS SOL FLD VOL > 1999CC: HCPCS | Performed by: INTERNAL MEDICINE

## 2017-10-16 PROCEDURE — 76937 US GUIDE VASCULAR ACCESS: CPT

## 2017-10-16 RX ORDER — POTASSIUM CHLORIDE 7.45 MG/ML
10 INJECTION INTRAVENOUS
Status: COMPLETED | OUTPATIENT
Start: 2017-10-16 | End: 2017-10-16

## 2017-10-16 RX ORDER — WARFARIN SODIUM 5 MG/1
5 TABLET ORAL ONCE
Status: DISCONTINUED | OUTPATIENT
Start: 2017-10-16 | End: 2017-10-16

## 2017-10-16 RX ORDER — POTASSIUM CHLORIDE 750 MG/1
40 TABLET, FILM COATED, EXTENDED RELEASE ORAL
Status: COMPLETED | OUTPATIENT
Start: 2017-10-16 | End: 2017-10-16

## 2017-10-16 RX ORDER — POTASSIUM CHLORIDE 750 MG/1
40 TABLET, FILM COATED, EXTENDED RELEASE ORAL ONCE
Status: COMPLETED | OUTPATIENT
Start: 2017-10-16 | End: 2017-10-16

## 2017-10-16 RX ADMIN — SODIUM CHLORIDE, SODIUM LACTATE, CALCIUM CHLORIDE, MAGNESIUM CHLORIDE AND DEXTROSE 2500 ML: 1.5; 538; 448; 18.3; 5.08 INJECTION, SOLUTION INTRAPERITONEAL at 11:40

## 2017-10-16 RX ADMIN — CALCITRIOL 0.25 MCG: 0.25 CAPSULE, LIQUID FILLED ORAL at 10:23

## 2017-10-16 RX ADMIN — PANTOPRAZOLE SODIUM 40 MG: 40 TABLET, DELAYED RELEASE ORAL at 07:12

## 2017-10-16 RX ADMIN — Medication 10 ML: at 21:32

## 2017-10-16 RX ADMIN — SODIUM CHLORIDE, SODIUM LACTATE, CALCIUM CHLORIDE, MAGNESIUM CHLORIDE AND DEXTROSE 2500 ML: 1.5; 538; 448; 18.3; 5.08 INJECTION, SOLUTION INTRAPERITONEAL at 23:09

## 2017-10-16 RX ADMIN — POTASSIUM CHLORIDE 40 MEQ: 750 TABLET, FILM COATED, EXTENDED RELEASE ORAL at 11:22

## 2017-10-16 RX ADMIN — OXYCODONE HYDROCHLORIDE 5 MG: 5 TABLET ORAL at 21:32

## 2017-10-16 RX ADMIN — CLONIDINE HYDROCHLORIDE 0.3 MG: 0.2 TABLET ORAL at 10:24

## 2017-10-16 RX ADMIN — LEVOFLOXACIN 500 MG: 5 INJECTION, SOLUTION INTRAVENOUS at 16:35

## 2017-10-16 RX ADMIN — OXYCODONE HYDROCHLORIDE 5 MG: 5 TABLET ORAL at 14:34

## 2017-10-16 RX ADMIN — CLONIDINE HYDROCHLORIDE 0.3 MG: 0.2 TABLET ORAL at 19:27

## 2017-10-16 RX ADMIN — Medication 2500 MCG: at 10:26

## 2017-10-16 RX ADMIN — SODIUM CHLORIDE, SODIUM LACTATE, CALCIUM CHLORIDE, MAGNESIUM CHLORIDE AND DEXTROSE 2500 ML: 1.5; 538; 448; 18.3; 5.08 INJECTION, SOLUTION INTRAPERITONEAL at 16:01

## 2017-10-16 RX ADMIN — PREDNISONE 9 MG: 5 TABLET ORAL at 07:12

## 2017-10-16 RX ADMIN — SODIUM CHLORIDE, SODIUM LACTATE, CALCIUM CHLORIDE, MAGNESIUM CHLORIDE AND DEXTROSE 2500 ML: 1.5; 538; 448; 18.3; 5.08 INJECTION, SOLUTION INTRAPERITONEAL at 07:12

## 2017-10-16 RX ADMIN — Medication 10 ML: at 13:46

## 2017-10-16 RX ADMIN — ALLOPURINOL 100 MG: 100 TABLET ORAL at 10:24

## 2017-10-16 RX ADMIN — HYDROXYCHLOROQUINE SULFATE 200 MG: 200 TABLET, FILM COATED ORAL at 10:25

## 2017-10-16 RX ADMIN — Medication 10 ML: at 07:12

## 2017-10-16 RX ADMIN — APIXABAN 10 MG: 5 TABLET, FILM COATED ORAL at 19:28

## 2017-10-16 RX ADMIN — SODIUM CHLORIDE, SODIUM LACTATE, CALCIUM CHLORIDE, MAGNESIUM CHLORIDE AND DEXTROSE 2500 ML: 1.5; 538; 448; 18.3; 5.08 INJECTION, SOLUTION INTRAPERITONEAL at 03:13

## 2017-10-16 RX ADMIN — POTASSIUM CHLORIDE 10 MEQ: 10 INJECTION, SOLUTION INTRAVENOUS at 13:46

## 2017-10-16 RX ADMIN — POTASSIUM CHLORIDE 10 MEQ: 10 INJECTION, SOLUTION INTRAVENOUS at 12:00

## 2017-10-16 RX ADMIN — LOSARTAN POTASSIUM 50 MG: 50 TABLET ORAL at 10:24

## 2017-10-16 RX ADMIN — SODIUM CHLORIDE, SODIUM LACTATE, CALCIUM CHLORIDE, MAGNESIUM CHLORIDE AND DEXTROSE 2500 ML: 1.5; 538; 448; 18.3; 5.08 INJECTION, SOLUTION INTRAPERITONEAL at 19:35

## 2017-10-16 RX ADMIN — POTASSIUM CHLORIDE 40 MEQ: 750 TABLET, FILM COATED, EXTENDED RELEASE ORAL at 19:28

## 2017-10-16 RX ADMIN — POTASSIUM CHLORIDE 10 MEQ: 10 INJECTION, SOLUTION INTRAVENOUS at 21:33

## 2017-10-16 RX ADMIN — HYDROXYCHLOROQUINE SULFATE 200 MG: 200 TABLET, FILM COATED ORAL at 19:27

## 2017-10-16 RX ADMIN — METOPROLOL SUCCINATE 100 MG: 50 TABLET, EXTENDED RELEASE ORAL at 10:23

## 2017-10-16 RX ADMIN — AMLODIPINE BESYLATE 10 MG: 5 TABLET ORAL at 10:25

## 2017-10-16 RX ADMIN — OXYCODONE HYDROCHLORIDE 5 MG: 5 TABLET ORAL at 10:23

## 2017-10-16 NOTE — PROGRESS NOTES
Reviewed medical chart; met with the patient at the bedside. Patient is being seen for Lupus, ESRD, anemia of renal disease, community acquired pneumonia, pleural effusion, and hypertension. Patient lives with her parents in a one story home along with her 8year old daughter. Patient owns a cane and walker, but does not currently use any DME. She completes peritoneal dialysis at home on a daily basis. She has consumer directed personal care; her aunt, mother, and brother all serve as her personal care aids assisting her with bathing, dressing, cooking, and cleaning for 5 hours per day. She has a PCP - Dr. Marylen Clunes and gets her prescriptions at [facility] on [address] [place] [address]. Care Management will continue to follow her disposition. DERRELL Vizcarra    Care Management Interventions  PCP Verified by CM:  Yes  Palliative Care Criteria Met (RRAT>21 & CHF Dx)?: No  Mode of Transport at Discharge:  (Patient will travel via car at discharge. )  MyChart Signup: No  Discharge Durable Medical Equipment: No (Patient owns a cane and walker.  )  Physical Therapy Consult: No  Occupational Therapy Consult: No  Speech Therapy Consult: No  Current Support Network: Relative's Home (Patient and her 8year old daughter live with her parents.  )  Confirm Follow Up Transport:  (Patient will travel via car at discharge. )  Plan discussed with Pt/Family/Caregiver: Yes  Freedom of Choice Offered: Yes  Discharge Location  Discharge Placement: Home with family assistance

## 2017-10-16 NOTE — PROGRESS NOTES
Pharmacist Note - Warfarin Dosing  Consult provided for this 33 y. o.female to manage warfarin for h/o VTE     INR Goal: 2 - 3    Home regimen/ tablet size: 2.5 mg daily (has not taken for about 2 weeks)    Drugs that may increase INR: Levaquin, allopurinol  Drugs that may decrease INR: None  Other current anticoagulants/ drugs that may increase bleeding risk: None  Risk factors:  ESRD  Daily INR ordered: YES    Recent Labs      10/16/17   0330  10/15/17   0600  10/14/17   1538   HGB  9.8*  9.9*  11.1*   INR  1.4*  1.3*  1.2*     Date               INR                  Dose  10/14  1.2  4 mg  10/15               1.3                  4 mg  10/16               1.4                  5 mg                                                                               Assessment/ Plan: Will order warfarin 5 mg PO x 1 dose. Pharmacy will continue to monitor daily and adjust therapy as indicated. Please contact the pharmacist at   or [phone] for outpatient recommendations if needed.

## 2017-10-16 NOTE — PROGRESS NOTES
Hospitalist Progress Note  Fariba WattersDO jamal  Office: [phone]        Date of Service:  10/16/2017  NAME:  Venus Box  :  1985  MRN:  [id number]      Admission Summary:    33 y.o. female with h/o PE on warfarin, ESRD on PD, lupus, anemia, lumbar DDD, hyperlipidemia, chronic pain, chronic LE edema, and HTN who presents to the ED from home on 10/14/17 with PNA, nausea, and vomiting. Patient states chills, cough, congestion, SOB started about 2 weeks ago and were not getting better so she went to [facility] ED yesterday. She had a CXR but no labs, was told she had pneumonia, given a dose of cefdinir in the ED then discharged from the ED with a script for cefdinir. She states she should have been admitted so she came to [address] [place] [address] ED today. She reports getting her flu shot in September this year and thinks she may have had a PNA vaccine about 5 years ago. She c/o chest discomfort with coughing, cough productive of yellow sputum, SOB with exertion, and generalized body aches. She states that she last vomited yesterday, has been unable to take her meds or even hold down water due to the nausea. She reports adherence to PD and no complications with the catheter or PD itself. She denies weight changes, diarrhea, dysuria but only urinates about once every couple of days. She was admitted for further evaluation and treatment of her CAP. Interval history / Subjective:   Patient seen and examined this morning with the nurse, Elliott Viera, present. She has no complaints states she is feeling much better today. Denies headache, dizziness, lightheadedness, cp, palpations, sob, bearden, cough, abd pain, constipation, diarrhea, fever, chills, nausea, vomiting. Assessment & Plan:     1. CAP: Blood cultures pending, legionella pending. Continue with levofloxacin renally dosed. NC O2 as needed. Improving.  Continue with levofloxacin, change to PO.  2. Nausea: appears resolved currently. Likely secondary to #1. Monitor. 3. ESRD: on PD, nephrology consulted, appreciate input. 4. Anemia: secondary to renal disease. H/H stable, no need for transfusion at this time. Monitor. 5. HTN: controlled. Continue current management. 6. Hypokalemia: continues, ordered potassium IV. Monitor. 7. Lupus: continue with warfarin, plaquinel, prednisone. INR subtherapeurtic, pharmacy dosing. 8. Possible PE: VQ scan done and high probability of PE. She is already on Warfarin and pharmacy is dosing. Patient admits she does not like warfarin secondary to the frequent blood checks. She is likely non compliant at home. Will switch to Eliquis. Code status: full  DVT prophylaxis: warfarin switch to [facility] [address] [address] discussed with: Patient/Family and Nurse  Disposition: Home w/Family and TBD     Hospital Problems  Date Reviewed: 10/14/2017          Codes Class Noted POA    * (Principal)Community acquired pneumonia of left lower lobe of lung ([facility] ICD-10-CM: J18.1  ICD-9-CM: 227  10/14/2017 Yes        Pleural effusion, left ICD-10-CM: J90  ICD-9-CM: 511.9  10/14/2017 Yes        Hypertension (Chronic) ICD-10-CM: I10  ICD-9-CM: 401.9  10/14/2017 Yes        Anemia of renal disease ICD-10-CM: D63.1  ICD-9-CM: 285.21  2/21/2017 Yes        ESRD on peritoneal dialysis ([facility] (Chronic) ICD-10-CM: N18.6, Z99.2  ICD-9-CM: 585.6, V45.11  10/7/2016 Yes        Lupus ICD-10-CM: L93.0  ICD-9-CM: 695.4  2/27/2012 Yes                Review of Systems:   A comprehensive review of systems was negative except for that written in the HPI. Vital Signs:    Last 24hrs VS reviewed since prior progress note.  Most recent are:  Visit Vitals    /87    Pulse 75    Temp 97.7 °F (36.5 °C)    Resp 16    Wt 71.6 kg (157 lb 14.4 oz)    SpO2 97%    BMI 26.28 kg/m2         Intake/Output Summary (Last 24 hours) at 10/16/17 1410  Last data filed at 10/16/17 1147   Gross per 24 hour   Intake 58227 ml   Output            16423 ml   Net            -1450 ml        Physical Examination:             Constitutional:  No acute distress, cooperative, pleasant,   ENT:  Oral mucous moist, oropharynx benign. Resp:  CTA bilaterally. No wheezing/rhonchi/rales. No accessory muscle use   CV:  Regular rhythm, normal rate, no murmurs, gallops, rubs    GI:  Soft, non distended, non tender. normoactive bowel sounds, no hepatosplenomegaly. PD port    Musculoskeletal:  No edema, warm, 2+ pulses throughout    Neurologic:  Moves all extremities. AAOx3, CN II-XII reviewed     Psych:  Good insight, Not anxious nor agitated. Skin:  Good turgor, no rashes or ulcers  Hematologic/Lymphatic/Immunlogic:  No jaundice nor lymph node swelling  Eyes:  EOMI. Anicteric sclerae       Data Review:    Review and/or order of clinical lab test      Labs:     Recent Labs      10/16/17   0330  10/15/17   0600   WBC  3.9  3.0*   HGB  9.8*  9.9*   HCT  29.1*  29.8*   PLT  192  178     Recent Labs      10/16/17   0330  10/15/17   0600  10/14/17   2205  10/14/17   1538   NA  135*  134*   --   134*   K  2.8*  3.2*  2.2*  2.6*   CL  97  97   --   95*   CO2  26  22   --   30   BUN  25*  29*   --   28*   CREA  8.85*  9.84*   --   9.68*   GLU  89  93   --   94   CA  8.0*  7.6*   --   8.6   MG  2.0  1.4*   --   1.4*   PHOS  3.7  4.6   --   4.4     Recent Labs      10/16/17   0330  10/15/17   0600  10/14/17   1538   SGOT  18  25  39*   ALT  13  17  20   AP  49  48  58   TBILI  0.2  0.3  0.4   TP  5.9*  5.8*  7.0   ALB  1.5*  1.4*  1.7*   GLOB  4.4*  4.4*  5.3*     Recent Labs      10/16/17   0330  10/15/17   0600  10/14/17   1538   INR  1.4*  1.3*  1.2*   PTP  14.0*  13.2*  11.9*      No results for input(s): FE, TIBC, PSAT, FERR in the last 72 hours. Lab Results   Component Value Date/Time    Folate 6.4 12/11/2015 06:38 PM      No results for input(s): PH, PCO2, PO2 in the last 72 hours.   No results for input(s): CPK, CKNDX, TROIQ in the last 72 hours.    No lab exists for component: CPKMB  Lab Results   Component Value Date/Time    Cholesterol, total 117 09/25/2015 02:02 PM    HDL Cholesterol 31 09/25/2015 02:02 PM    LDL, calculated 57 09/25/2015 02:02 PM    Triglyceride 146 09/25/2015 02:02 PM    CHOL/HDL Ratio 7.7 05/31/2009 10:30 AM     Lab Results   Component Value Date/Time    Glucose (POC) 95 09/16/2015 12:08 PM    Glucose (POC) 116 09/03/2013 06:20 AM    Glucose (POC) 151 09/02/2013 09:07 PM    Glucose (POC) 162 09/02/2013 04:57 PM    Glucose (POC) 133 09/02/2013 11:58 AM    Glucose (POC) 203 09/01/2013 11:02 PM     Lab Results   Component Value Date/Time    Color YELLOW/STRAW 08/12/2016 09:06 PM    Appearance CLEAR 08/12/2016 09:06 PM    Specific gravity 1.017 08/12/2016 09:06 PM    Specific gravity 1.010 07/11/2016 12:44 PM    pH (UA) 7.0 08/12/2016 09:06 PM    Protein 300 08/12/2016 09:06 PM    Glucose NEGATIVE  08/12/2016 09:06 PM    Ketone TRACE 08/12/2016 09:06 PM    Bilirubin NEGATIVE  07/11/2016 12:44 PM    Urobilinogen 1.0 08/12/2016 09:06 PM    Nitrites NEGATIVE  08/12/2016 09:06 PM    Leukocyte Esterase NEGATIVE  08/12/2016 09:06 PM    Epithelial cells MODERATE 08/12/2016 09:06 PM    Bacteria 1+ 08/12/2016 09:06 PM    WBC 0-4 08/12/2016 09:06 PM    RBC 0-5 08/12/2016 09:06 PM         Medications Reviewed:     Current Facility-Administered Medications   Medication Dose Route Frequency    potassium chloride 10 mEq in 100 ml IVPB  10 mEq IntraVENous Q1H    technetium albumin aggregated (MAA) solution 4 millicurie  4 millicurie IntraVENous RAD ONCE    potassium chloride SR (KLOR-CON 10) tablet 40 mEq  40 mEq Oral ONCE    warfarin (COUMADIN) tablet 5 mg  5 mg Oral ONCE    peritoneal dialysis DEXTROSE 1.5% (2.5 mEq/L low calcium) solution 2,500 mL  2,500 mL IntraPERitoneal Q4H    pantoprazole (PROTONIX) tablet 40 mg  40 mg Oral ACB    calcium carbonate (TUMS) chewable tablet 200 mg [elemental]  200 mg Oral QID PRN    allopurinol (ZYLOPRIM) tablet 100 mg  100 mg Oral DAILY    amLODIPine (NORVASC) tablet 10 mg  10 mg Oral DAILY    calcitRIOL (ROCALTROL) capsule 0.25 mcg  0.25 mcg Oral DAILY    cloNIDine HCl (CATAPRES) tablet 0.3 mg  0.3 mg Oral BID    cyanocobalamin (VITAMIN B12) tablet 2,500 mcg  2,500 mcg Oral DAILY    [START ON 10/19/2017] ergocalciferol (ERGOCALCIFEROL) capsule 50,000 Units  50,000 Units Oral Q7D    hydroxychloroquine (PLAQUENIL) tablet 200 mg  200 mg Oral BID    losartan (COZAAR) tablet 50 mg  50 mg Oral DAILY    metoprolol succinate (TOPROL-XL) XL tablet 100 mg  100 mg Oral DAILY    oxyCODONE IR (ROXICODONE) tablet 5 mg  5 mg Oral Q4H PRN    predniSONE (DELTASONE) tablet 9 mg  9 mg Oral DAILY WITH BREAKFAST    senna (SENOKOT) tablet 8.6 mg  1 Tab Oral DAILY PRN    . PHARMACY TO SUBSTITUTE PER PROTOCOL 1 Each  1 Each Oral Per Protocol    sodium chloride (NS) flush 5-10 mL  5-10 mL IntraVENous Q8H    sodium chloride (NS) flush 5-10 mL  5-10 mL IntraVENous PRN    ondansetron (ZOFRAN) injection 4 mg  4 mg IntraVENous Q4H PRN    Warfarin pharmacy to dose   Other Rx Dosing/Monitoring    levoFLOXacin (LEVAQUIN) 500 mg in D5W IVPB  500 mg IntraVENous Q48H    hydrALAZINE (APRESOLINE) 20 mg/mL injection 10 mg  10 mg IntraVENous Q6H PRN     ______________________________________________________________________  EXPECTED LENGTH OF STAY: 4d 12h  ACTUAL LENGTH OF STAY:          [address] [facility] Floor, DO

## 2017-10-16 NOTE — PROGRESS NOTES
Patient name: Sana Bunch  MRN: [id number]    Nephrology Progress note:    Assessment:  ESRD: CCPD-> CAPD while inpt  LLL PNA: clinically improving  Hypokalemia/Hypomagnesemia  HTN: improved  PE: INR sub-therapeutic  Lupus: on plaquinil/azathioprine/prednisone  Anemia: Hgb stable  SHPT: Phos stable    Plan/Recommendations:  CAPD-> Ct 1. 5%Dextrose bags  Oral/IV KCl  Abx per hospitalist team  Increase protein intake  Consider discharge      Subjective:  \" I feel much better\". Improved SOB/cough    ROS:   No nausea, no vomiting  No chest pain, no shortness of breath    Exam:  Visit Vitals    BP (!) 142/92 (BP 1 Location: Right arm, BP Patient Position: At rest;Sitting)    Pulse 69    Temp 96.7 °F (35.9 °C)    Resp 16    Wt 71.6 kg (157 lb 14.4 oz)    SpO2 97%    BMI 26.28 kg/m2     Wt Readings from Last 3 Encounters:   10/16/17 71.6 kg (157 lb 14.4 oz)   10/13/17 70.8 kg (156 lb)   09/15/17 83 kg (183 lb)       Intake/Output Summary (Last 24 hours) at 10/16/17 1035  Last data filed at 10/16/17 0733   Gross per 24 hour   Intake            77596 ml   Output            61106 ml   Net            -1600 ml       Gen: NAD  HEENT: No icterus, mmm, no oral exudate, AT/NC  Lungs/Chest wall: Coarse BS  Cardiovascular: Regular rate, normal rhythm  Abdomen/: Soft, NT, ND, BS+.  PD catheter  Ext:  No peripheral edema      Current Facility-Administered Medications   Medication Dose Route Frequency Last Dose    potassium chloride SR (KLOR-CON 10) tablet 40 mEq  40 mEq Oral NOW      potassium chloride 10 mEq in 100 ml IVPB  10 mEq IntraVENous Q1H      technetium albumin aggregated (MAA) solution 4 millicurie  4 millicurie IntraVENous RAD ONCE      peritoneal dialysis DEXTROSE 1.5% (2.5 mEq/L low calcium) solution 2,500 mL  2,500 mL IntraPERitoneal Q4H 2,500 mL at 10/16/17 0712    pantoprazole (PROTONIX) tablet 40 mg  40 mg Oral ACB 40 mg at 10/16/17 0346    calcium carbonate (TUMS) chewable tablet 200 mg [elemental]  200 mg Oral QID  mg at 10/15/17 2051    allopurinol (ZYLOPRIM) tablet 100 mg  100 mg Oral DAILY 100 mg at 10/16/17 1024    amLODIPine (NORVASC) tablet 10 mg  10 mg Oral DAILY 10 mg at 10/16/17 1025    calcitRIOL (ROCALTROL) capsule 0.25 mcg  0.25 mcg Oral DAILY 0.25 mcg at 10/16/17 1023    cloNIDine HCl (CATAPRES) tablet 0.3 mg  0.3 mg Oral BID 0.3 mg at 10/16/17 1024    cyanocobalamin (VITAMIN B12) tablet 2,500 mcg  2,500 mcg Oral DAILY 2,500 mcg at 10/16/17 1026    [START ON 10/19/2017] ergocalciferol (ERGOCALCIFEROL) capsule 50,000 Units  50,000 Units Oral Q7D      hydroxychloroquine (PLAQUENIL) tablet 200 mg  200 mg Oral  mg at 10/16/17 1025    losartan (COZAAR) tablet 50 mg  50 mg Oral DAILY 50 mg at 10/16/17 1024    metoprolol succinate (TOPROL-XL) XL tablet 100 mg  100 mg Oral DAILY 100 mg at 10/16/17 1023    oxyCODONE IR (ROXICODONE) tablet 5 mg  5 mg Oral Q4H PRN 5 mg at 10/16/17 1023    predniSONE (DELTASONE) tablet 9 mg  9 mg Oral DAILY WITH BREAKFAST 9 mg at 10/16/17 1323    senna (SENOKOT) tablet 8.6 mg  1 Tab Oral DAILY PRN      . PHARMACY TO SUBSTITUTE PER PROTOCOL 1 Each  1 Each Oral Per Protocol      sodium chloride (NS) flush 5-10 mL  5-10 mL IntraVENous Q8H 10 mL at 10/16/17 0712    sodium chloride (NS) flush 5-10 mL  5-10 mL IntraVENous PRN      ondansetron (ZOFRAN) injection 4 mg  4 mg IntraVENous Q4H PRN      Warfarin pharmacy to dose   Other Rx Dosing/Monitoring      levoFLOXacin (LEVAQUIN) 500 mg in D5W IVPB  500 mg IntraVENous Q48H      hydrALAZINE (APRESOLINE) 20 mg/mL injection 10 mg  10 mg IntraVENous Q6H PRN         Labs/Data:    Lab Results   Component Value Date/Time    WBC 3.9 10/16/2017 03:30 AM    Hemoglobin (POC) 11.2 09/16/2015 12:08 PM    HGB 9.8 10/16/2017 03:30 AM    Hematocrit (POC) 33 09/16/2015 12:08 PM    HCT 29.1 10/16/2017 03:30 AM    PLATELET 426 37/57/0606 03:30 AM    MCV 90.1 10/16/2017 03:30 AM       Lab Results   Component Value Date/Time    Sodium 135 10/16/2017 03:30 AM    Potassium 2.8 10/16/2017 03:30 AM    Chloride 97 10/16/2017 03:30 AM    CO2 26 10/16/2017 03:30 AM    Anion gap 12 10/16/2017 03:30 AM    Glucose 89 10/16/2017 03:30 AM    BUN 25 10/16/2017 03:30 AM    Creatinine 8.85 10/16/2017 03:30 AM    BUN/Creatinine ratio 3 10/16/2017 03:30 AM    GFR est AA 6 10/16/2017 03:30 AM    GFR est non-AA 5 10/16/2017 03:30 AM    Calcium 8.0 10/16/2017 03:30 AM       Patient seen and examined. Chart reviewed. Labs, data and other pertinent notes reviewed in last 24 hrs.     Discussed with patient and RN    Signed by:  Radha Abreu MD

## 2017-10-16 NOTE — PROGRESS NOTES
Bedside and Verbal shift change report given to Miles Collins RN (oncoming nurse) by Melanie Orozco RN (offgoing nurse). Report included the following information SBAR, Kardex, ED Summary, [facility] and Recent Results.

## 2017-10-17 VITALS
HEART RATE: 67 BPM | TEMPERATURE: 97.6 F | WEIGHT: 171.1 LBS | OXYGEN SATURATION: 96 % | DIASTOLIC BLOOD PRESSURE: 75 MMHG | BODY MASS INDEX: 28.47 KG/M2 | RESPIRATION RATE: 18 BRPM | SYSTOLIC BLOOD PRESSURE: 110 MMHG

## 2017-10-17 LAB
ALBUMIN SERPL-MCNC: 1.4 G/DL (ref 3.5–5)
ALBUMIN/GLOB SERPL: 0.3 {RATIO} (ref 1.1–2.2)
ALP SERPL-CCNC: 55 U/L (ref 45–117)
ALT SERPL-CCNC: 15 U/L (ref 12–78)
ANION GAP SERPL CALC-SCNC: 10 MMOL/L (ref 5–15)
AST SERPL-CCNC: 28 U/L (ref 15–37)
BILIRUB SERPL-MCNC: 0.2 MG/DL (ref 0.2–1)
BUN SERPL-MCNC: 23 MG/DL (ref 6–20)
BUN/CREAT SERPL: 3 (ref 12–20)
CALCIUM SERPL-MCNC: 7.9 MG/DL (ref 8.5–10.1)
CHLORIDE SERPL-SCNC: 100 MMOL/L (ref 97–108)
CO2 SERPL-SCNC: 25 MMOL/L (ref 21–32)
CREAT SERPL-MCNC: 8.42 MG/DL (ref 0.55–1.02)
ERYTHROCYTE [DISTWIDTH] IN BLOOD BY AUTOMATED COUNT: 15.5 % (ref 11.5–14.5)
GLOBULIN SER CALC-MCNC: 4.4 G/DL (ref 2–4)
GLUCOSE SERPL-MCNC: 91 MG/DL (ref 65–100)
HCT VFR BLD AUTO: 29.6 % (ref 35–47)
HGB BLD-MCNC: 9.7 G/DL (ref 11.5–16)
INR PPP: 1.9 (ref 0.9–1.1)
MAGNESIUM SERPL-MCNC: 1.9 MG/DL (ref 1.6–2.4)
MCH RBC QN AUTO: 29.8 PG (ref 26–34)
MCHC RBC AUTO-ENTMCNC: 32.8 G/DL (ref 30–36.5)
MCV RBC AUTO: 90.8 FL (ref 80–99)
PHOSPHATE SERPL-MCNC: 3.4 MG/DL (ref 2.6–4.7)
PLATELET # BLD AUTO: 193 K/UL (ref 150–400)
POTASSIUM SERPL-SCNC: 3.6 MMOL/L (ref 3.5–5.1)
PROT SERPL-MCNC: 5.8 G/DL (ref 6.4–8.2)
PROTHROMBIN TIME: 19.5 SEC (ref 9–11.1)
RBC # BLD AUTO: 3.26 M/UL (ref 3.8–5.2)
SODIUM SERPL-SCNC: 135 MMOL/L (ref 136–145)
WBC # BLD AUTO: 3.2 K/UL (ref 3.6–11)

## 2017-10-17 PROCEDURE — 80053 COMPREHEN METABOLIC PANEL: CPT | Performed by: INTERNAL MEDICINE

## 2017-10-17 PROCEDURE — 74011250637 HC RX REV CODE- 250/637: Performed by: INTERNAL MEDICINE

## 2017-10-17 PROCEDURE — 84100 ASSAY OF PHOSPHORUS: CPT | Performed by: INTERNAL MEDICINE

## 2017-10-17 PROCEDURE — A4722 DIALYS SOL FLD VOL > 1999CC: HCPCS | Performed by: INTERNAL MEDICINE

## 2017-10-17 PROCEDURE — 36415 COLL VENOUS BLD VENIPUNCTURE: CPT | Performed by: INTERNAL MEDICINE

## 2017-10-17 PROCEDURE — 83735 ASSAY OF MAGNESIUM: CPT | Performed by: INTERNAL MEDICINE

## 2017-10-17 PROCEDURE — 74011250636 HC RX REV CODE- 250/636: Performed by: INTERNAL MEDICINE

## 2017-10-17 PROCEDURE — 74011636637 HC RX REV CODE- 636/637: Performed by: INTERNAL MEDICINE

## 2017-10-17 PROCEDURE — 85027 COMPLETE CBC AUTOMATED: CPT | Performed by: INTERNAL MEDICINE

## 2017-10-17 PROCEDURE — 85610 PROTHROMBIN TIME: CPT | Performed by: INTERNAL MEDICINE

## 2017-10-17 RX ORDER — LEVOFLOXACIN 500 MG/1
500 TABLET, FILM COATED ORAL
Qty: 3 TAB | Refills: 0 | Status: SHIPPED | OUTPATIENT
Start: 2017-10-17 | End: 2017-10-24 | Stop reason: ALTCHOICE

## 2017-10-17 RX ADMIN — CLONIDINE HYDROCHLORIDE 0.3 MG: 0.2 TABLET ORAL at 09:47

## 2017-10-17 RX ADMIN — PREDNISONE 9 MG: 5 TABLET ORAL at 07:00

## 2017-10-17 RX ADMIN — OXYCODONE HYDROCHLORIDE 5 MG: 5 TABLET ORAL at 09:47

## 2017-10-17 RX ADMIN — LOSARTAN POTASSIUM 50 MG: 50 TABLET ORAL at 09:46

## 2017-10-17 RX ADMIN — CALCITRIOL 0.25 MCG: 0.25 CAPSULE, LIQUID FILLED ORAL at 09:45

## 2017-10-17 RX ADMIN — SODIUM CHLORIDE, SODIUM LACTATE, CALCIUM CHLORIDE, MAGNESIUM CHLORIDE AND DEXTROSE 2500 ML: 1.5; 538; 448; 18.3; 5.08 INJECTION, SOLUTION INTRAPERITONEAL at 15:40

## 2017-10-17 RX ADMIN — SODIUM CHLORIDE, SODIUM LACTATE, CALCIUM CHLORIDE, MAGNESIUM CHLORIDE AND DEXTROSE 2500 ML: 1.5; 538; 448; 18.3; 5.08 INJECTION, SOLUTION INTRAPERITONEAL at 07:02

## 2017-10-17 RX ADMIN — Medication 2500 MCG: at 09:47

## 2017-10-17 RX ADMIN — AMLODIPINE BESYLATE 10 MG: 5 TABLET ORAL at 09:47

## 2017-10-17 RX ADMIN — PANTOPRAZOLE SODIUM 40 MG: 40 TABLET, DELAYED RELEASE ORAL at 07:01

## 2017-10-17 RX ADMIN — Medication 10 ML: at 13:55

## 2017-10-17 RX ADMIN — HYDROXYCHLOROQUINE SULFATE 200 MG: 200 TABLET, FILM COATED ORAL at 09:47

## 2017-10-17 RX ADMIN — APIXABAN 10 MG: 5 TABLET, FILM COATED ORAL at 09:46

## 2017-10-17 RX ADMIN — METOPROLOL SUCCINATE 100 MG: 50 TABLET, EXTENDED RELEASE ORAL at 09:46

## 2017-10-17 RX ADMIN — Medication 10 ML: at 07:01

## 2017-10-17 RX ADMIN — SODIUM CHLORIDE, SODIUM LACTATE, CALCIUM CHLORIDE, MAGNESIUM CHLORIDE AND DEXTROSE 2500 ML: 1.5; 538; 448; 18.3; 5.08 INJECTION, SOLUTION INTRAPERITONEAL at 03:10

## 2017-10-17 RX ADMIN — ALLOPURINOL 100 MG: 100 TABLET ORAL at 09:46

## 2017-10-17 RX ADMIN — SODIUM CHLORIDE, SODIUM LACTATE, CALCIUM CHLORIDE, MAGNESIUM CHLORIDE AND DEXTROSE 2500 ML: 1.5; 538; 448; 18.3; 5.08 INJECTION, SOLUTION INTRAPERITONEAL at 11:52

## 2017-10-17 NOTE — PROGRESS NOTES
Bedside shift change report given to Katya Bermudez (oncoming nurse) by Joseph Henderson (offgoing nurse). Report included the following information SBAR, Kardex, Intake/Output, MAR and Recent Results.

## 2017-10-17 NOTE — DISCHARGE SUMMARY
Discharge Summary       PATIENT ID: Danielle Taylor  MRN: [id number]   YOB: 1985    DATE OF ADMISSION: 10/14/2017  1:54 PM    DATE OF DISCHARGE: 10/17/2017   PRIMARY CARE PROVIDER: Governor Yogesh NP     ATTENDING PHYSICIAN: Dr Stan Haque  DISCHARGING PROVIDER: Stan Haque MD    To contact this individual call [phone] and ask the  to page. If unavailable ask to be transferred the Adult Hospitalist Department. CONSULTATIONS: IP CONSULT TO HOSPITALIST  IP CONSULT TO NEPHROLOGY    PROCEDURES/SURGERIES: * No surgery found *    ADMITTING [address] [place] [address] COURSE:   Community Acquired Pneumonia:   -Blood cultures unremarkable.    -Flu screen negative  -Continue with levofloxacin renally dosed. NC O2 as needed. Improving.   -Continue with levofloxacin, change to PO today at discharge    Nausea:   -appears resolved currently. ESRD:   -on PD, nephrology consulted, appreciate input.   -Cleared for discharge    Anemia of chronic disease:   -secondary to renal disease. -H/H stable, no need for transfusion at this time. Monitor. HTN:   -controlled. Continue current management. Hypokalemia:   -replace as needed    Lupus:   -continue with plaquenil, prednisone.   -Was on coumadin, now switched to Eliquis    Acute PE:   -VQ scan done and high probability of PE. She was already on Warfarin and pharmacy is dosing. Patient admits she does not like warfarin secondary to the frequent blood checks. She is likely non compliant at home. Previous hospitalist switched to Eliquis. Code status: full  DVT prophylaxis: Eliquis  PTA: home        DISCHARGE DIAGNOSES / PLAN:      1. Pneumonia  2.  Acute PE       PENDING TEST RESULTS:   At the time of discharge the following test results are still pending: none    FOLLOW UP APPOINTMENTS:    Follow-up Information     Follow up With Details Comments Rajesh Colorado NP In 1 week  [address] [facility] [address]  [phone]             ADDITIONAL CARE RECOMMENDATIONS:   Follow up with PMD in 1 week  Follow up with Nephrology in 2 weeks    DIET: Renal Diet    ACTIVITY: Activity as tolerated      DISCHARGE MEDICATIONS:  Current Discharge Medication List      START taking these medications    Details   apixaban (ELIQUIS) 5 mg tablet Take 2 Tabs by mouth two (2) times a day. For 6 days and then 1 tab twice daily  Qty: 70 Tab, Refills: 2      levoFLOXacin (LEVAQUIN) 500 mg tablet Take 1 Tab by mouth every fourty-eight (48) hours. Qty: 3 Tab, Refills: 0      l.acidoph-B.lactis-B.longum (FLORAJEN3) 460 mg (7.5-6- 1.5 bill. cell) cap cap Take 1 Cap by mouth Daily (before breakfast). Qty: 5 Cap, Refills: 0         CONTINUE these medications which have NOT CHANGED    Details   oxyCODONE IR (ROXICODONE) 5 mg immediate release tablet Take 1 tab in AM and HALF to 1 tab in PM if needed on days where back pain is severe     Qty: 40 Tab, Refills: 0    Associated Diagnoses: Chronic bilateral low back pain without sciatica      amLODIPine (NORVASC) 10 mg tablet Take 1 Tab by mouth daily. Qty: 30 Tab, Refills: 5    Associated Diagnoses: Malignant hypertension      losartan (COZAAR) 50 mg tablet Take 1 Tab by mouth daily. For blood pressure  Qty: 30 Tab, Refills: 5    Associated Diagnoses: Malignant hypertension      cloNIDine HCl (CATAPRES) 0.3 mg tablet Take 1 Tab by mouth two (2) times a day. For blood pressure  Qty: 60 Tab, Refills: 5    Associated Diagnoses: Malignant hypertension      metoprolol succinate (TOPROL-XL) 100 mg tablet Take 1 Tab by mouth daily. For blood pressure  Qty: 30 Tab, Refills: 5    Associated Diagnoses: Malignant hypertension      furosemide (LASIX) 40 mg tablet Take 1 Tab by mouth daily.  For fluid  Qty: 30 Tab, Refills: 5    Associated Diagnoses: Malignant hypertension      AURYXIA 210 mg iron tablet TK 2 TS PO WITH MEALS  Refills: 12      predniSONE (DELTASONE) 10 mg tablet Take 9 mg by mouth daily (with breakfast). ergocalciferol (VITAMIN D2) 50,000 unit capsule Take 50,000 Units by mouth every seven (7) days. hydroxychloroquine (PLAQUENIL) 200 mg tablet Take 200 mg by mouth two (2) times a day. ondansetron hcl (ZOFRAN) 8 mg tablet Take 1 Tab by mouth every eight (8) hours as needed. For Nausea  Indications: GASTROPARESIS  Qty: 15 Tab, Refills: 1      allopurinol (ZYLOPRIM) 100 mg tablet Take 100 mg by mouth daily. cyanocobalamin (VITAMIN B-12) 2,500 mcg sublingual tablet Take 1 Tab by mouth daily. Qty: 90 Tab, Refills: 1    Associated Diagnoses: Leukopenia; Low vitamin B12 level; Hypotension due to drugs      senna (SENNA) 8.6 mg tablet Take 1 Tab by mouth daily as needed. pantoprazole (PROTONIX) 40 mg tablet Take 1 Tab by mouth Daily (before breakfast). Qty: 30 Tab, Refills: 0      calcitRIOL (ROCALTROL) 0.25 mcg capsule Take 0.25 mcg by mouth daily. Refills: 5         STOP taking these medications       warfarin (COUMADIN) 2.5 mg tablet Comments:   Reason for Stopping:                 NOTIFY YOUR PHYSICIAN FOR ANY OF THE FOLLOWING:   Fever over 101 degrees for 24 hours. Chest pain, shortness of breath, fever, chills, nausea, vomiting, diarrhea, change in mentation, falling, weakness, bleeding. Severe pain or pain not relieved by medications. Or, any other signs or symptoms that you may have questions about.     DISPOSITION:  x  Home With:   OT  PT  HH  RN       Long term SNF/Inpatient Rehab    Independent/assisted living    Hospice    Other:       PATIENT CONDITION AT DISCHARGE:     Functional status    Poor     Deconditioned    x Independent      Cognition    x Lucid     Forgetful     Dementia      Catheters/lines (plus indication)    Franco     PICC     PEG    x None      Code status    x Full code     DNR      PHYSICAL EXAMINATION AT DISCHARGE:  Please see progress note      CHRONIC MEDICAL DIAGNOSES:  Problem List as of 10/17/2017  Date Reviewed: 10/14/2017          Codes Class Noted - Resolved    * (Principal)Community acquired pneumonia of left lower lobe of lung ([facility] ICD-10-CM: J18.1  ICD-9-CM: 255  10/14/2017 - Present        Pleural effusion, left ICD-10-CM: J90  ICD-9-CM: 511.9  10/14/2017 - Present        Hypertension (Chronic) ICD-10-CM: I10  ICD-9-CM: 401.9  10/14/2017 - Present        Chronic bilateral low back pain without sciatica ICD-10-CM: M54.5, G89.29  ICD-9-CM: 724.2, 338.29  5/26/2017 - Present        Anemia of renal disease ICD-10-CM: D63.1  ICD-9-CM: 285.21  2/21/2017 - Present        Dependence on peritoneal dialysis [facility]) ICD-10-CM: Z99.2  ICD-9-CM: V45.11  2/21/2017 - Present        ACP (advance care planning) ICD-10-CM: Z71.89  ICD-9-CM: V65.49  2/21/2017 - Present        ESRD on peritoneal dialysis [facility]) (Chronic) ICD-10-CM: N18.6, Z99.2  ICD-9-CM: 585.6, V45.11  10/7/2016 - Present        Malignant hypertension ICD-10-CM: I10  ICD-9-CM: 401.0  7/11/2016 - Present        Encounter for monitoring opioid maintenance therapy ICD-10-CM: Z51.81, Z79.891  ICD-9-CM: V58.83, V58.69  11/3/2015 - Present        Pulmonary embolism ([facility] ICD-10-CM: I26.99  ICD-9-CM: 415.19  5/8/2013 - Present        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  ICD-9-CM: 710.0, 583.81  3/10/2012 - Present        Lupus ICD-10-CM: L93.0  ICD-9-CM: 695.4  2/27/2012 - Present        RESOLVED: Idiopathic chronic inflammatory bowel disease ICD-10-CM: K63.89  ICD-9-CM: 558.9  8/28/2013 - 8/28/2013        RESOLVED: Abdominal pain ICD-10-CM: R10.9  ICD-9-CM: 789.00  8/28/2013 - 9/3/2013        RESOLVED: Hypoxia ICD-10-CM: R09.02  ICD-9-CM: 799.02  4/25/2013 - 4/30/2013        RESOLVED: 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

## 2017-10-17 NOTE — PROGRESS NOTES
Hospitalist Progress Note  Prudence Feng MD  Office: [phone]  Cell: [phone]        Date of Service:  10/17/2017  NAME:  Yasmeen Hector  :  1985  MRN:  [id number]      Admission Summary:    33 y.o. female with h/o PE on warfarin, ESRD on PD, lupus, anemia, lumbar DDD, hyperlipidemia, chronic pain, chronic LE edema, and HTN who presents to the ED from home on 10/14/17 with PNA, nausea, and vomiting. Patient states chills, cough, congestion, SOB started about 2 weeks ago and were not getting better so she went to [address] [place] [address] ED yesterday. She had a CXR but no labs, was told she had pneumonia, given a dose of cefdinir in the ED then discharged from the ED with a script for cefdinir. She states she should have been admitted so she came to [facility] ED today. She reports getting her flu shot in September this year and thinks she may have had a PNA vaccine about 5 years ago. She c/o chest discomfort with coughing, cough productive of yellow sputum, SOB with exertion, and generalized body aches. She states that she last vomited yesterday, has been unable to take her meds or even hold down water due to the nausea. She reports adherence to PD and no complications with the catheter or PD itself. She denies weight changes, diarrhea, dysuria but only urinates about once every couple of days. She was admitted for further evaluation and treatment of her CAP. Interval history / Subjective:   F/y Pneumonia    She has no complaints states she is feeling much better and wants to get discharged. Denies headache, dizziness, lightheadedness, cp, palpations, sob, bearden, cough, abd pain, constipation, diarrhea, fever, chills, nausea, vomiting. Assessment & Plan:     Community Acquired Pneumonia:   -Blood cultures unremarkable.    -Flu screen negative  -Continue with levofloxacin renally dosed. NC O2 as needed.  Improving.   -Continue with levofloxacin, change to PO today at discharge    Nausea:   -appears resolved currently. ESRD:   -on PD, nephrology consulted, appreciate input.   -Cleared for discharge    Anemia of chronic disease:   -secondary to renal disease. -H/H stable, no need for transfusion at this time. Monitor. HTN:   -controlled. Continue current management. Hypokalemia:   -replace as needed    Lupus:   -continue with plaquenil, prednisone.   -Was on coumadin, now switched to Eliquis    Acute PE:   -VQ scan done and high probability of PE. She was already on Warfarin and pharmacy is dosing. Patient admits she does not like warfarin secondary to the frequent blood checks. She is likely non compliant at home. Previous hospitalist switched to Eliquis. Code status: full  DVT prophylaxis: Eliquis  PTA: home    Plan: Discharge home with Eliquis. Will find out from inpatient pharmacy if the insurance would approve before discharge    Care Plan discussed with: Patient/Family and Nurse  Disposition: Home w/Family and TBD     Hospital Problems  Date Reviewed: 10/14/2017          Codes Class Noted POA    * (Principal)Community acquired pneumonia of left lower lobe of lung ([facility] ICD-10-CM: J18.1  ICD-9-CM: 729  10/14/2017 Yes        Pleural effusion, left ICD-10-CM: J90  ICD-9-CM: 511.9  10/14/2017 Yes        Hypertension (Chronic) ICD-10-CM: I10  ICD-9-CM: 401.9  10/14/2017 Yes        Anemia of renal disease ICD-10-CM: D63.1  ICD-9-CM: 285.21  2/21/2017 Yes        ESRD on peritoneal dialysis ([facility]) (Chronic) ICD-10-CM: N18.6, Z99.2  ICD-9-CM: 585.6, V45.11  10/7/2016 Yes        Lupus ICD-10-CM: L93.0  ICD-9-CM: 695.4  2/27/2012 Yes                Review of Systems:   A comprehensive review of systems was negative except for that written in the HPI. Vital Signs:    Last 24hrs VS reviewed since prior progress note.  Most recent are:  Visit Vitals    /76    Pulse 69    Temp 97.6 °F (36.4 °C)    Resp 18    Wt 71.6 kg (157 lb 14.4 oz)    SpO2 96%  BMI 26.28 kg/m2         Intake/Output Summary (Last 24 hours) at 10/17/17 0706  Last data filed at 10/17/17 0315   Gross per 24 hour   Intake            55607 ml   Output            63399 ml   Net             -550 ml        Physical Examination:             Constitutional:  No acute distress, cooperative, pleasant,   ENT:  Oral mucous moist, oropharynx benign. Resp:  CTA bilaterally. No wheezing/rhonchi/rales. No accessory muscle use   CV:  Regular rhythm, normal rate, no murmurs, gallops, rubs    GI:  Soft, non distended, non tender. normoactive bowel sounds, no hepatosplenomegaly. PD port    Musculoskeletal:  No edema, warm, 2+ pulses throughout    Neurologic:  Moves all extremities. AAOx3, CN II-XII reviewed     Psych:  Good insight, Not anxious nor agitated. Skin:  Good turgor, no rashes or ulcers  Hematologic/Lymphatic/Immunlogic:  No jaundice nor lymph node swelling  Eyes:  EOMI. Anicteric sclerae       Data Review:    Review and/or order of clinical lab test      Labs:     Recent Labs      10/17/17   0315  10/16/17   0330   WBC  3.2*  3.9   HGB  9.7*  9.8*   HCT  29.6*  29.1*   PLT  193  192     Recent Labs      10/17/17   0315  10/16/17   0330  10/15/17   0600   NA  135*  135*  134*   K  3.6  2.8*  3.2*   CL  100  97  97   CO2  25  26  22   BUN  23*  25*  29*   CREA  8.42*  8.85*  9.84*   GLU  91  89  93   CA  7.9*  8.0*  7.6*   MG  1.9  2.0  1.4*   PHOS  3.4  3.7  4.6     Recent Labs      10/17/17   0315  10/16/17   0330  10/15/17   0600   SGOT  28  18  25   ALT  15  13  17   AP  55  49  48   TBILI  0.2  0.2  0.3   TP  5.8*  5.9*  5.8*   ALB  1.4*  1.5*  1.4*   GLOB  4.4*  4.4*  4.4*     Recent Labs      10/17/17   0315  10/16/17   0330  10/15/17   0600   INR  1.9*  1.4*  1.3*   PTP  19.5*  14.0*  13.2*      No results for input(s): FE, TIBC, PSAT, FERR in the last 72 hours.    Lab Results   Component Value Date/Time    Folate 6.4 12/11/2015 06:38 PM      No results for input(s): PH, PCO2, PO2 in the last 72 hours. No results for input(s): CPK, CKNDX, TROIQ in the last 72 hours.     No lab exists for component: CPKMB  Lab Results   Component Value Date/Time    Cholesterol, total 117 09/25/2015 02:02 PM    HDL Cholesterol 31 09/25/2015 02:02 PM    LDL, calculated 57 09/25/2015 02:02 PM    Triglyceride 146 09/25/2015 02:02 PM    CHOL/HDL Ratio 7.7 05/31/2009 10:30 AM     Lab Results   Component Value Date/Time    Glucose (POC) 95 09/16/2015 12:08 PM    Glucose (POC) 116 09/03/2013 06:20 AM    Glucose (POC) 151 09/02/2013 09:07 PM    Glucose (POC) 162 09/02/2013 04:57 PM    Glucose (POC) 133 09/02/2013 11:58 AM    Glucose (POC) 203 09/01/2013 11:02 PM     Lab Results   Component Value Date/Time    Color YELLOW/STRAW 08/12/2016 09:06 PM    Appearance CLEAR 08/12/2016 09:06 PM    Specific gravity 1.017 08/12/2016 09:06 PM    Specific gravity 1.010 07/11/2016 12:44 PM    pH (UA) 7.0 08/12/2016 09:06 PM    Protein 300 08/12/2016 09:06 PM    Glucose NEGATIVE  08/12/2016 09:06 PM    Ketone TRACE 08/12/2016 09:06 PM    Bilirubin NEGATIVE  07/11/2016 12:44 PM    Urobilinogen 1.0 08/12/2016 09:06 PM    Nitrites NEGATIVE  08/12/2016 09:06 PM    Leukocyte Esterase NEGATIVE  08/12/2016 09:06 PM    Epithelial cells MODERATE 08/12/2016 09:06 PM    Bacteria 1+ 08/12/2016 09:06 PM    WBC 0-4 08/12/2016 09:06 PM    RBC 0-5 08/12/2016 09:06 PM         Medications Reviewed:     Current Facility-Administered Medications   Medication Dose Route Frequency    apixaban (ELIQUIS) tablet 10 mg  10 mg Oral BID    [START ON 10/23/2017] apixaban (ELIQUIS) tablet 5 mg  5 mg Oral BID    peritoneal dialysis DEXTROSE 1.5% (2.5 mEq/L low calcium) solution 2,500 mL  2,500 mL IntraPERitoneal Q4H    pantoprazole (PROTONIX) tablet 40 mg  40 mg Oral ACB    calcium carbonate (TUMS) chewable tablet 200 mg [elemental]  200 mg Oral QID PRN    allopurinol (ZYLOPRIM) tablet 100 mg  100 mg Oral DAILY    amLODIPine (NORVASC) tablet 10 mg  10 mg Oral DAILY    calcitRIOL (ROCALTROL) capsule 0.25 mcg  0.25 mcg Oral DAILY    cloNIDine HCl (CATAPRES) tablet 0.3 mg  0.3 mg Oral BID    cyanocobalamin (VITAMIN B12) tablet 2,500 mcg  2,500 mcg Oral DAILY    [START ON 10/19/2017] ergocalciferol (ERGOCALCIFEROL) capsule 50,000 Units  50,000 Units Oral Q7D    hydroxychloroquine (PLAQUENIL) tablet 200 mg  200 mg Oral BID    losartan (COZAAR) tablet 50 mg  50 mg Oral DAILY    metoprolol succinate (TOPROL-XL) XL tablet 100 mg  100 mg Oral DAILY    oxyCODONE IR (ROXICODONE) tablet 5 mg  5 mg Oral Q4H PRN    predniSONE (DELTASONE) tablet 9 mg  9 mg Oral DAILY WITH BREAKFAST    senna (SENOKOT) tablet 8.6 mg  1 Tab Oral DAILY PRN    . PHARMACY TO SUBSTITUTE PER PROTOCOL 1 Each  1 Each Oral Per Protocol    sodium chloride (NS) flush 5-10 mL  5-10 mL IntraVENous Q8H    sodium chloride (NS) flush 5-10 mL  5-10 mL IntraVENous PRN    ondansetron (ZOFRAN) injection 4 mg  4 mg IntraVENous Q4H PRN    levoFLOXacin (LEVAQUIN) 500 mg in D5W IVPB  500 mg IntraVENous Q48H    hydrALAZINE (APRESOLINE) 20 mg/mL injection 10 mg  10 mg IntraVENous Q6H PRN     ______________________________________________________________________  EXPECTED LENGTH OF STAY: 4d 12h  ACTUAL LENGTH OF STAY:          3                 Kasey Tate MD

## 2017-10-17 NOTE — PROGRESS NOTES
Patient name: Sana Bunch  MRN: [id number]    Nephrology Progress note:    Assessment:  ESRD: CCPD-> CAPD while inpt  LLL PNA: clinically improving  Hypokalemia/Hypomagnesemia: s/p supplementation  HTN: improved  PE: INR sub-therapeutic  Lupus: on plaquinil/azathioprine/prednisone  Anemia: Hgb stable  SHPT: Phos stable    Plan/Recommendations:  CAPD-> Ct 1. 5%Dextrose bags while here  Awaiting discharge today  Back to prior CCPD Rx on discharge  F/u with PD RN for repeat lab work  Abx per hospitalist team  Increase protein intake        Subjective:  No events overnight. Improving SOB/cough    ROS:   No nausea, no vomiting  No chest pain, no shortness of breath    Exam:  Visit Vitals    BP (!) 123/94    Pulse 68    Temp 97.8 °F (36.6 °C)    Resp 18    Wt 77.6 kg (171 lb 1.6 oz)    SpO2 96%    BMI 28.47 kg/m2     Wt Readings from Last 3 Encounters:   10/16/17 77.6 kg (171 lb 1.6 oz)   10/13/17 70.8 kg (156 lb)   09/15/17 83 kg (183 lb)       Intake/Output Summary (Last 24 hours) at 10/17/17 1106  Last data filed at 10/17/17 0705   Gross per 24 hour   Intake            79708 ml   Output            15118 ml   Net             -550 ml       Gen: NAD  HEENT: No icterus, mmm, no oral exudate, AT/NC  Lungs/Chest wall: Coarse BS  Cardiovascular: Regular rate, normal rhythm  Abdomen/: Soft, NT, ND, BS+.  PD catheter  Ext:  No peripheral edema      Current Facility-Administered Medications   Medication Dose Route Frequency Last Dose    apixaban (ELIQUIS) tablet 10 mg  10 mg Oral BID 10 mg at 10/17/17 0946    [START ON 10/23/2017] apixaban (ELIQUIS) tablet 5 mg  5 mg Oral BID      peritoneal dialysis DEXTROSE 1.5% (2.5 mEq/L low calcium) solution 2,500 mL  2,500 mL IntraPERitoneal Q4H 2,500 mL at 10/17/17 0702    pantoprazole (PROTONIX) tablet 40 mg  40 mg Oral ACB 40 mg at 10/17/17 0701    calcium carbonate (TUMS) chewable tablet 200 mg [elemental]  200 mg Oral QID  mg at 10/15/17 2051    allopurinol (ZYLOPRIM) tablet 100 mg  100 mg Oral DAILY 100 mg at 10/17/17 0946    amLODIPine (NORVASC) tablet 10 mg  10 mg Oral DAILY 10 mg at 10/17/17 0947    calcitRIOL (ROCALTROL) capsule 0.25 mcg  0.25 mcg Oral DAILY 0.25 mcg at 10/17/17 0945    cloNIDine HCl (CATAPRES) tablet 0.3 mg  0.3 mg Oral BID 0.3 mg at 10/17/17 0947    cyanocobalamin (VITAMIN B12) tablet 2,500 mcg  2,500 mcg Oral DAILY 2,500 mcg at 10/17/17 0947    [START ON 10/19/2017] ergocalciferol (ERGOCALCIFEROL) capsule 50,000 Units  50,000 Units Oral Q7D      hydroxychloroquine (PLAQUENIL) tablet 200 mg  200 mg Oral  mg at 10/17/17 0947    losartan (COZAAR) tablet 50 mg  50 mg Oral DAILY 50 mg at 10/17/17 0946    metoprolol succinate (TOPROL-XL) XL tablet 100 mg  100 mg Oral DAILY 100 mg at 10/17/17 0946    oxyCODONE IR (ROXICODONE) tablet 5 mg  5 mg Oral Q4H PRN 5 mg at 10/17/17 0947    predniSONE (DELTASONE) tablet 9 mg  9 mg Oral DAILY WITH BREAKFAST 9 mg at 10/17/17 0700    senna (SENOKOT) tablet 8.6 mg  1 Tab Oral DAILY PRN      . PHARMACY TO SUBSTITUTE PER PROTOCOL 1 Each  1 Each Oral Per Protocol      sodium chloride (NS) flush 5-10 mL  5-10 mL IntraVENous Q8H 10 mL at 10/17/17 0701    sodium chloride (NS) flush 5-10 mL  5-10 mL IntraVENous PRN      ondansetron (ZOFRAN) injection 4 mg  4 mg IntraVENous Q4H PRN      levoFLOXacin (LEVAQUIN) 500 mg in D5W IVPB  500 mg IntraVENous Q48H 500 mg at 10/16/17 1635    hydrALAZINE (APRESOLINE) 20 mg/mL injection 10 mg  10 mg IntraVENous Q6H PRN Labs/Data:    Lab Results   Component Value Date/Time    WBC 3.2 10/17/2017 03:15 AM    Hemoglobin (POC) 11.2 09/16/2015 12:08 PM    HGB 9.7 10/17/2017 03:15 AM    Hematocrit (POC) 33 09/16/2015 12:08 PM    HCT 29.6 10/17/2017 03:15 AM    PLATELET 624 51/11/6688 03:15 AM    MCV 90.8 10/17/2017 03:15 AM       Lab Results   Component Value Date/Time    Sodium 135 10/17/2017 03:15 AM    Potassium 3.6 10/17/2017 03:15 AM    Chloride 100 10/17/2017 03:15 AM    CO2 25 10/17/2017 03:15 AM    Anion gap 10 10/17/2017 03:15 AM    Glucose 91 10/17/2017 03:15 AM    BUN 23 10/17/2017 03:15 AM    Creatinine 8.42 10/17/2017 03:15 AM    BUN/Creatinine ratio 3 10/17/2017 03:15 AM    GFR est AA 7 10/17/2017 03:15 AM    GFR est non-AA 6 10/17/2017 03:15 AM    Calcium 7.9 10/17/2017 03:15 AM       Patient seen and examined. Chart reviewed. Labs, data and other pertinent notes reviewed in last 24 hrs.     Discussed with patient and Dr. Starr Gautam by:  Herrera Cabrera MD

## 2017-10-17 NOTE — PROGRESS NOTES
Respiratory Educator    Pneumonia education provided to this 28yo female with CAP. See physician's notes. Purpose is to educate pt on pneumonia prevention. Goal is to prevent readmission for the same. S: \"I'm going home today\"    O/A: Pt awake in bed on RA with O2 Sats 97%, HR:88, RR: 16, BS: Clear t /o . Pt states no prior resp. Problems and has no resp. meds at home. P: Pneumonia packet presented and contents discussed  1. Discussed pneumonia: What it is and affects to the lungs  2. Discussed preventive measures as outlined I packet. 3. Discussed importance of adhering to Drs orders  4. Discussed importance of f/up with Dr after discharge.     Reba Mcginnis, RRT  Respiratory Care  Pulmonary Disease Case Manager

## 2017-10-17 NOTE — PROGRESS NOTES
Reviewed medical chart; patient will discharge home today. [facility] Follow Up Appointment has been scheduled for 10/24/17 at 10:30AM with Morgan Alfonso NP. Handoff given via Shiva Segal to Carson Mendieta, Nurse Navigator for Morgan Alfonso. No other discharge needs identified at this time.    DERRELL Patino

## 2017-10-17 NOTE — PROGRESS NOTES
Bedside shift change report given to Natanael Bañuelos [id number] (oncoming nurse) by Heidi Allan (offgoing nurse). Report included the following information SBAR.

## 2017-10-17 NOTE — DISCHARGE INSTRUCTIONS
DISCHARGE SUMMARY from Nurse    The following personal items are in your possession at time of discharge:    Dental Appliances: None  Visual Aid: None     Home Medications: None  Jewelry: Bracelet  Clothing: At bedside  Other Valuables: None             PATIENT INSTRUCTIONS:    After general anesthesia or intravenous sedation, for 24 hours or while taking prescription Narcotics:  · Limit your activities  · Do not drive and operate hazardous machinery  · Do not make important personal or business decisions  · Do  not drink alcoholic beverages  · If you have not urinated within 8 hours after discharge, please contact your surgeon on call. Report the following to your surgeon:  · Excessive pain, swelling, redness or odor of or around the surgical area  · Temperature over 100.5  · Nausea and vomiting lasting longer than 4 hours or if unable to take medications  · Any signs of decreased circulation or nerve impairment to extremity: change in color, persistent  numbness, tingling, coldness or increase pain  · Any questions        *  Please give a list of your current medications to your Primary Care Provider. *  Please update this list whenever your medications are discontinued, doses are      changed, or new medications (including over-the-counter products) are added. *  Please carry medication information at all times in case of emergency situations. These are general instructions for a healthy lifestyle:    No smoking/ No tobacco products/ Avoid exposure to second hand smoke    Surgeon General's Warning:  Quitting smoking now greatly reduces serious risk to your health.     Obesity, smoking, and sedentary lifestyle greatly increases your risk for illness    A healthy diet, regular physical exercise & weight monitoring are important for maintaining a healthy lifestyle    You may be retaining fluid if you have a history of heart failure or if you experience any of the following symptoms:  Weight gain of 3 pounds or more overnight or 5 pounds in a week, increased swelling in our hands or feet or shortness of breath while lying flat in bed. Please call your doctor as soon as you notice any of these symptoms; do not wait until your next office visit. Recognize signs and symptoms of STROKE:    F-face looks uneven    A-arms unable to move or move unevenly    S-speech slurred or non-existent    T-time-call 911 as soon as signs and symptoms begin-DO NOT go       Back to bed or wait to see if you get better-TIME IS BRAIN. Warning Signs of HEART ATTACK     Call 911 if you have these symptoms:   Chest discomfort. Most heart attacks involve discomfort in the center of the chest that lasts more than a few minutes, or that goes away and comes back. It can feel like uncomfortable pressure, squeezing, fullness, or pain.  Discomfort in other areas of the upper body. Symptoms can include pain or discomfort in one or both arms, the back, neck, jaw, or stomach.  Shortness of breath with or without chest discomfort.  Other signs may include breaking out in a cold sweat, nausea, or lightheadedness. Don't wait more than five minutes to call 911 - MINUTES MATTER! Fast action can save your life. Calling 911 is almost always the fastest way to get lifesaving treatment. Emergency Medical Services staff can begin treatment when they arrive -- up to an hour sooner than if someone gets to the hospital by car. The discharge information has been reviewed with the patient. The patient verbalized understanding. Discharge medications reviewed with the patient and appropriate educational materials and side effects teaching were provided.              Discharge Instructions       PATIENT ID: Иван Crump  MRN: [id number]   YOB: 1985    DATE OF ADMISSION: 10/14/2017  1:54 PM    DATE OF DISCHARGE: 10/17/2017    PRIMARY CARE PROVIDER: Rea Beltre NP     ATTENDING PHYSICIAN: Robin Shultz MD  DISCHARGING PROVIDER: Shana Coburn MD    To contact this individual call [phone] and ask the  to page. If unavailable ask to be transferred the Adult Hospitalist Department. DISCHARGE DIAGNOSES   Community Acquired Pneumonia    CONSULTATIONS: IP CONSULT TO HOSPITALIST  IP CONSULT TO NEPHROLOGY    PROCEDURES/SURGERIES: * No surgery found *    PENDING TEST RESULTS:   At the time of discharge the following test results are still pending: none    FOLLOW UP APPOINTMENTS:   Follow-up Information     Follow up With Details Comments Contact Mary Mcneil NP In 1 week  [address] [phone]             ADDITIONAL CARE RECOMMENDATIONS:   Follow with PMD in 1 week  Follow up with Nephrology in 1-2 weeks    DIET: Renal Diet    ACTIVITY: Activity as tolerated      DISCHARGE MEDICATIONS:   See Medication Reconciliation Form    · It is important that you take the medication exactly as they are prescribed. · Keep your medication in the bottles provided by the pharmacist and keep a list of the medication names, dosages, and times to be taken in your wallet. · Do not take other medications without consulting your doctor. NOTIFY YOUR PHYSICIAN FOR ANY OF THE FOLLOWING:   Fever over 101 degrees for 24 hours. Chest pain, shortness of breath, fever, chills, nausea, vomiting, diarrhea, change in mentation, falling, weakness, bleeding. Severe pain or pain not relieved by medications. Or, any other signs or symptoms that you may have questions about.       DISPOSITION:   x Home With:   OT  PT  HH  RN       SNF/Inpatient Rehab/LTAC    Independent/assisted living    Hospice    Other:     CDMP Checked:   Yes x     PROBLEM LIST Updated:  Yes x       Signed:   Shana Coburn MD  10/17/2017  9:06 AM

## 2017-10-17 NOTE — PROGRESS NOTES
Bedside and Verbal shift change report given to Taisha Masterson RN (oncoming nurse) by Иван Archibald RN (offgoing nurse). Report included the following information SBAR, Kardex, ED Summary, [facility] and Recent Results.

## 2017-10-18 ENCOUNTER — TELEPHONE (OUTPATIENT)
Dept: RESPIRATORY THERAPY | Age: 32
End: 2017-10-18

## 2017-10-19 ENCOUNTER — PATIENT OUTREACH (OUTPATIENT)
Dept: FAMILY MEDICINE CLINIC | Age: 32
End: 2017-10-19

## 2017-10-19 LAB
BACTERIA SPEC CULT: NORMAL
SERVICE CMNT-IMP: NORMAL

## 2017-10-19 NOTE — PROGRESS NOTES
Alton Guadarrama is a 33 y.o. female   This patient was received as a referral from daily census report. Patient was admitted to [facility] 10/14/17-10/17/17 for Pneumonia  Called patient 10/19/17 and patient verified with two identifiers. Low Risk            0       Total Score            Criteria that do not apply:    Has Seen PCP in Last 6 Months (Yes=3, No=0)    . Living with Significant Other. Assisted Living. LTAC. SNF. or   Rehab    Patient Length of Stay (>5 days = 3)    IP Visits Last 12 Months (1-3=4, 4=9, >4=11)    Pt. Coverage (Medicare=5 , Medicaid, or Self-Pay=4)    Charlson Comorbidity Score (Age + Comorbid Conditions)        Summary of patients top three problems:     Problem 1: pneumonia- patient admitted to [facility] 10/14/17-10/17/17 for pneumonia. Reports of shortness of breath, malaise, and productive cough. Patient went to [email or web address] and diagnosed her with pneumonia. Prescribed antibiotics and sent home. Patient went to [facility] for above symptoms and was admitted. Chest xray confirmed left lower lobe pneumonia with pleural effusion. New prescriptions Levaquin, Apixaban and Florajen3. Reports compliance with newly prescribed medications. Patient's challenges to self management identified:    Patients motivational level on a scale of 0-10:   Medication Management:  good adherence and good understanding   Current Outpatient Prescriptions   Medication Sig    apixaban (ELIQUIS) 5 mg tablet Take 2 Tabs by mouth two (2) times a day. For 6 days and then 1 tab twice daily    levoFLOXacin (LEVAQUIN) 500 mg tablet Take 1 Tab by mouth every fourty-eight (48) hours.  l.acidoph-B.lactis-B.longum (FLORAJEN3) 460 mg (7.5-6- 1.5 bill. cell) cap cap Take 1 Cap by mouth Daily (before breakfast).     oxyCODONE IR (ROXICODONE) 5 mg immediate release tablet Take 1 tab in AM and HALF to 1 tab in PM if needed on days where back pain is severe       amLODIPine (NORVASC) 10 mg tablet Take 1 Tab by mouth daily.    losartan (COZAAR) 50 mg tablet Take 1 Tab by mouth daily. For blood pressure    cloNIDine HCl (CATAPRES) 0.3 mg tablet Take 1 Tab by mouth two (2) times a day. For blood pressure (Patient taking differently: Take 0.3 mg by mouth three (3) times daily. For blood pressure)    metoprolol succinate (TOPROL-XL) 100 mg tablet Take 1 Tab by mouth daily. For blood pressure    furosemide (LASIX) 40 mg tablet Take 1 Tab by mouth daily. For fluid    AURYXIA 210 mg iron tablet TK 2 TS PO WITH MEALS    predniSONE (DELTASONE) 10 mg tablet Take 9 mg by mouth daily (with breakfast).  ergocalciferol (VITAMIN D2) 50,000 unit capsule Take 50,000 Units by mouth every seven (7) days.  hydroxychloroquine (PLAQUENIL) 200 mg tablet Take 200 mg by mouth two (2) times a day.  ondansetron hcl (ZOFRAN) 8 mg tablet Take 1 Tab by mouth every eight (8) hours as needed. For Nausea  Indications: GASTROPARESIS    allopurinol (ZYLOPRIM) 100 mg tablet Take 100 mg by mouth daily.  cyanocobalamin (VITAMIN B-12) 2,500 mcg sublingual tablet Take 1 Tab by mouth daily.  pantoprazole (PROTONIX) 40 mg tablet Take 1 Tab by mouth Daily (before breakfast).  calcitRIOL (ROCALTROL) 0.25 mcg capsule Take 0.25 mcg by mouth daily.  senna (SENNA) 8.6 mg tablet Take 1 Tab by mouth daily as needed. No current facility-administered medications for this visit. Advance Care Planning:   Patient was offered the opportunity to discuss advance care planning:  No, NN will plan to discuss with patient during upcoming call             [employer], Referrals, and Durable Medical Equipment: none    Follow up appointments:  Patient has her initial [facility] appointment with PCP scheduled for  10/24/17. Goals        Patient [facility] (pt-stated)            10/19/17- NN did not discuss this with patient. Will plan to discuss with patient during upcoming call.  sc       Effective airway clearance r/t to pneumonia (pt-stated)            10/19/17- patient admitted to [facility] 10/14/17-10/17/17 for pneumonia. Patient will assess respirations ie shortness of breath. Patient will cough to promote removal of secretions to improve breathing. Patient will increase ambulation to mobilize secretions. sc        Infection (pt-stated)            10/19/17- patient will assess/closely monitor her temperatures  and report fevers greater than 101. Patient will assess hydration status by consuming at least six cups of water to avoid dehydration. Patient will take Levaquin as prescribed. sc            Patient verbalized understanding of all information discussed. Patient has this Nurse Navigators contact information for any further questions, concerns, or needs.

## 2017-10-24 ENCOUNTER — OFFICE VISIT (OUTPATIENT)
Dept: FAMILY MEDICINE CLINIC | Age: 32
End: 2017-10-24

## 2017-10-24 VITALS
HEIGHT: 65 IN | HEART RATE: 82 BPM | BODY MASS INDEX: 25.96 KG/M2 | WEIGHT: 155.8 LBS | OXYGEN SATURATION: 90 % | DIASTOLIC BLOOD PRESSURE: 100 MMHG | SYSTOLIC BLOOD PRESSURE: 122 MMHG | TEMPERATURE: 98.5 F | RESPIRATION RATE: 16 BRPM

## 2017-10-24 DIAGNOSIS — Z99.2 ESRD ON PERITONEAL DIALYSIS (HCC): Chronic | ICD-10-CM

## 2017-10-24 DIAGNOSIS — I26.99 OTHER ACUTE PULMONARY EMBOLISM WITHOUT ACUTE COR PULMONALE (HCC): ICD-10-CM

## 2017-10-24 DIAGNOSIS — J18.9 COMMUNITY ACQUIRED PNEUMONIA OF LEFT LOWER LOBE OF LUNG: Primary | ICD-10-CM

## 2017-10-24 DIAGNOSIS — N18.6 ESRD ON PERITONEAL DIALYSIS (HCC): Chronic | ICD-10-CM

## 2017-10-24 DIAGNOSIS — M32.14 LUPUS NEPHRITIS (HCC): ICD-10-CM

## 2017-10-24 NOTE — PROGRESS NOTES
Ms. Frank Todd is a 33y.o. year old female, she is seen today for Transition of Care services following a hospital discharge for pneumonia  on 10/17/17. Our office Nurse Navigator performed an outreach to Ms. Suyapa Berrios on 10/19/17 (within 2 business days of discharge) to complete medication reconciliation and a telephonic assessment of her condition. Subjective:  Hospital Follow up  Patient seen for hospital follow up for pneumonia and PE. Patient presented to [facility] ED on 10/14/17 with c/o chills, cough, congestion, SOB for 2 weeks. She was seen at [facility] ED the day prior and diagnosed with pneumonia. She was treated with cefdinir and discharged home. She returned to [facility] ED unresolved symptoms. Patient was admitted fr further treatment and evaluation. She was treated with levofloxacin renally dosed and NC O2 as needed. Blood cultures negative, flu screen negative. She was discharged on PO Levaquin. VQ scan was also done and showed high probability of PE. She was already on Warfarin but was noncompliant given frequent blood checks. She was switched to Eliquis. She reports adherence to PD daily and no complications with the catheter or PD itself. Prior to Admission medications    Medication Sig Start Date End Date Taking? Authorizing Provider   apixaban (ELIQUIS) 5 mg tablet Take 2 Tabs by mouth two (2) times a day. For 6 days and then 1 tab twice daily 10/17/17  Yes Eryn Gates MD   l.acidoph-B.lactis-B.longum [facility]) 460 mg (7.5-6- 1.5 bill. cell) cap cap Take 1 Cap by mouth Daily (before breakfast). 10/17/17  Yes Eryn Gates MD   oxyCODONE IR (ROXICODONE) 5 mg immediate release tablet Take 1 tab in AM and HALF to 1 tab in PM if needed on days where back pain is severe    10/13/17  Yes Isa Alonzo MD   amLODIPine (NORVASC) 10 mg tablet Take 1 Tab by mouth daily. 10/7/16  Yes Dewayne Soriano NP   losartan (COZAAR) 50 mg tablet Take 1 Tab by mouth daily.  For blood pressure 10/7/16  Yes Jarod Mora NP   cloNIDine HCl (CATAPRES) 0.3 mg tablet Take 1 Tab by mouth two (2) times a day. For blood pressure  Patient taking differently: Take 0.3 mg by mouth three (3) times daily. For blood pressure 10/7/16  Yes Jarod Mora NP   metoprolol succinate (TOPROL-XL) 100 mg tablet Take 1 Tab by mouth daily. For blood pressure 10/7/16  Yes Jarod Mora NP   furosemide (LASIX) 40 mg tablet Take 1 Tab by mouth daily. For fluid 10/7/16  Yes Jarod Mora NP   AURYXIA 210 mg iron tablet TK 2 TS PO WITH MEALS 7/20/16  Yes Historical Provider   predniSONE (DELTASONE) 10 mg tablet Take 9 mg by mouth daily (with breakfast). Yes Historical Provider   ergocalciferol (VITAMIN D2) 50,000 unit capsule Take 50,000 Units by mouth every seven (7) days. Yes Darnell Franks MD   hydroxychloroquine (PLAQUENIL) 200 mg tablet Take 200 mg by mouth two (2) times a day. Yes Phys MD Tiny   ondansetron hcl (ZOFRAN) 8 mg tablet Take 1 Tab by mouth every eight (8) hours as needed. For Nausea  Indications: GASTROPARESIS 7/14/16  Yes Delfina Min MD   allopurinol (ZYLOPRIM) 100 mg tablet Take 100 mg by mouth daily. 10/15/15  Yes Historical Provider   cyanocobalamin (VITAMIN B-12) 2,500 mcg sublingual tablet Take 1 Tab by mouth daily. 10/27/15  Yes Man Sawyer MD   pantoprazole (PROTONIX) 40 mg tablet Take 1 Tab by mouth Daily (before breakfast). 10/23/15  Yes Carter Zavala MD   calcitRIOL (ROCALTROL) 0.25 mcg capsule Take 0.25 mcg by mouth daily. 8/6/15  Yes Historical Provider   senna (SENNA) 8.6 mg tablet Take 1 Tab by mouth daily as needed. Yes Historical Provider          Allergies   Allergen Reactions    Compazine [Prochlorperazine Edisylate] Nausea and Vomiting and Palpitations    Fish Containing Products Rash     An itchy rash appeared in about 1 hour           ROS  See HPI    Objective:   Physical Exam   Constitutional: She is oriented to person, place, and time.  She appears well-developed and well-nourished. Neck: Normal range of motion. Neck supple. No JVD present. Carotid bruit is not present. No thyromegaly present. Cardiovascular: Normal rate, regular rhythm and intact distal pulses. Exam reveals no gallop and no friction rub. No murmur heard. Pulmonary/Chest: Effort normal and breath sounds normal. No respiratory distress. Musculoskeletal: She exhibits no edema. Lymphadenopathy:     She has no cervical adenopathy. Neurological: She is alert and oriented to person, place, and time. Psychiatric: She has a normal mood and affect. Her behavior is normal.   Nursing note and vitals reviewed. Visit Vitals    BP (!) 122/100 (BP 1 Location: Right arm, BP Patient Position: Sitting)    Pulse 82    Temp 98.5 °F (36.9 °C) (Oral)    Resp 16    Ht 5' 5\" (1.651 m)    Wt 155 lb 12.8 oz (70.7 kg)    SpO2 90%    BMI 25.93 kg/m2       Assessment & Plan:  Diagnoses and all orders for this visit:    1. Community acquired pneumonia of left lower lobe of lung ([facility] [address]  Symptoms improved. Repeat CXR to ensure resolution. -     XR CHEST PA LAT; Future    2. Other acute pulmonary embolism without acute cor pulmonale (HCC)  Discussed importance of compliance with Eliquis. 3. ESRD on peritoneal dialysis [facility])  Managed by nephrology, no changes. 4. Lupus nephritis ([facility] [address]  Managed by rheumatology, no changes. I have discussed the diagnosis with the patient and the intended plan as seen in the above orders. The patient has received an after-visit summary along with patient information handout. I have discussed medication side effects and warnings with the patient as well. Follow-up Disposition:  Return if symptoms worsen or fail to improve.         Marina Kim NP

## 2017-10-24 NOTE — PATIENT INSTRUCTIONS
Pneumonia: Care Instructions  Your Care Instructions    Pneumonia is an infection of the lungs. Most cases are caused by infections from bacteria or viruses. Pneumonia may be mild or very severe. If it is caused by bacteria, you will be treated with antibiotics. It may take a few weeks to a few months to recover fully from pneumonia, depending on how sick you were and whether your overall health is good. Follow-up care is a key part of your treatment and safety. Be sure to make and go to all appointments, and call your doctor if you are having problems. Its also a good idea to know your test results and keep a list of the medicines you take. How can you care for yourself at home? · Take your antibiotics exactly as directed. Do not stop taking the medicine just because you are feeling better. You need to take the full course of antibiotics. · Take your medicines exactly as prescribed. Call your doctor if you think you are having a problem with your medicine. · Get plenty of rest and sleep. You may feel weak and tired for a while, but your energy level will improve with time. · To prevent dehydration, drink plenty of fluids, enough so that your urine is light yellow or clear like water. Choose water and other caffeine-free clear liquids until you feel better. If you have kidney, heart, or liver disease and have to limit fluids, talk with your doctor before you increase the amount of fluids you drink. · Take care of your cough so you can rest. A cough that brings up mucus from your lungs is common with pneumonia. It is one way your body gets rid of the infection. But if coughing keeps you from resting or causes severe fatigue and chest-wall pain, talk to your doctor. He or she may suggest that you take a medicine to reduce the cough. · Use a vaporizer or humidifier to add moisture to your bedroom. Follow the directions for cleaning the machine. · Do not smoke or allow others to smoke around you.  Smoke will make your cough last longer. If you need help quitting, talk to your doctor about stop-smoking programs and medicines. These can increase your chances of quitting for good. · Take an over-the-counter pain medicine, such as acetaminophen (Tylenol), ibuprofen (Advil, Motrin), or naproxen (Aleve). Read and follow all instructions on the label. · Do not take two or more pain medicines at the same time unless the doctor told you to. Many pain medicines have acetaminophen, which is Tylenol. Too much acetaminophen (Tylenol) can be harmful. · If you were given a spirometer to measure how well your lungs are working, use it as instructed. This can help your doctor tell how your recovery is going. · To prevent pneumonia in the future, talk to your doctor about getting a flu vaccine (once a year) and a pneumococcal vaccine (one time only for most people). When should you call for help? Call 911 anytime you think you may need emergency care. For example, call if:  · You have severe trouble breathing. Call your doctor now or seek immediate medical care if:  · You cough up dark brown or bloody mucus (sputum). · You have new or worse trouble breathing. · You are dizzy or lightheaded, or you feel like you may faint. Watch closely for changes in your health, and be sure to contact your doctor if:  · You have a new or higher fever. · You are coughing more deeply or more often. · You are not getting better after 2 days (48 hours). · You do not get better as expected. Where can you learn more? Go to http://jorge-rochelle.info/. Enter [id number] in the search box to learn more about \"Pneumonia: Care Instructions. \"  Current as of: March 25, 2017  Content Version: 11.3  © 1184-7648 [email or web address]. Care instructions adapted under license by [employer] (which disclaims liability or warranty for this information).  If you have questions about a medical condition or this instruction, always ask your healthcare professional. [place] [address] any warranty or liability for your use of this information.

## 2017-10-24 NOTE — MR AVS SNAPSHOT
Visit Information Date & Time Provider Department Dept. Phone Encounter #  
 10/24/2017 10:30 AM Reese Garcia  [facility] [address] [phone] [id number] Follow-up Instructions Return if symptoms worsen or fail to improve. Your Appointments 11/10/2017 10:20 AM  
Follow Up with Arielle Vazquez MD  
[facility]) Appt Note: follow up pain [facility]t [facility] [address] [place] [address] [facility] 99 [phone]  
  
   
 [address] [place] [id number] [address] Upcoming Health Maintenance Date Due DTaP/Tdap/Td series (1 - Tdap) 2/17/2007 Pneumococcal 19-64 Highest Risk (2 of 3 - PCV13) 10/1/2010 PAP AKA CERVICAL CYTOLOGY 4/13/2019 Allergies as of 10/24/2017  Review Complete On: 10/24/2017 By: Richard Medina LPN Severity Noted Reaction Type Reactions Compazine [Prochlorperazine Edisylate] High 07/11/2016   Systemic Nausea and Vomiting, Palpitations Fish Containing Products High 02/21/2017   Side Effect Rash An itchy rash appeared in about 1 hour Current Immunizations  Reviewed on 9/19/2014 Name Date Influenza Vaccine 9/12/2017, 9/9/2012 Influenza Vaccine Split 9/1/2011 ZZZ-RETIRED (DO NOT USE) Pneumococcal Vaccine (Unspecified Type) 10/1/2009 Not reviewed this visit You Were Diagnosed With   
  
 Codes Comments Community acquired pneumonia of left lower lobe of lung ([facility]    -  Primary ICD-10-CM: J18.1 ICD-9-CM: 534 Other acute pulmonary embolism without acute cor pulmonale (HCC)     ICD-10-CM: I26.99 
ICD-9-CM: 415.19 ESRD on peritoneal dialysis ([facility]     ICD-10-CM: N18.6, Z99.2 ICD-9-CM: 585.6, V45.11 Lupus nephritis ([facility]     ICD-10-CM: M32.14 ICD-9-CM: 710.0, 583.81 Vitals BP Pulse Temp Resp Height(growth percentile) Weight(growth percentile) (!) 122/100 (BP 1 Location: Right arm, BP Patient Position: Sitting) 82 98.5 °F (36.9 °C) (Oral) 16 5' 5\" (1.651 m) 155 lb 12.8 oz (70.7 kg) SpO2 BMI OB Status Smoking Status 90% 25.93 kg/m2 Medically Induced Never Smoker Vitals History BMI and BSA Data Body Mass Index Body Surface Area  
 25.93 kg/m 2 1.8 m 2 Preferred Pharmacy Pharmacy Name Phone [address], [address] Your Updated Medication List  
  
   
This list is accurate as of: 10/24/17 11:40 AM.  Always use your most recent med list.  
  
  
  
  
 allopurinol 100 mg tablet Commonly known as:  Jarvis James Take 100 mg by mouth daily. amLODIPine 10 mg tablet Commonly known as:  Marcela Saldana Take 1 Tab by mouth daily. apixaban 5 mg tablet Commonly known as:  Tracie Lu Take 2 Tabs by mouth two (2) times a day. For 6 days and then 1 tab twice daily AURYXIA 210 mg iron tablet Generic drug:  ferric citrate TK 2 TS PO WITH MEALS  
  
 calcitRIOL 0.25 mcg capsule Commonly known as:  ROCALTROL Take 0.25 mcg by mouth daily. cloNIDine HCl 0.3 mg tablet Commonly known as:  CATAPRES Take 1 Tab by mouth two (2) times a day. For blood pressure  
  
 cyanocobalamin 2,500 mcg sublingual tablet Commonly known as:  VITAMIN B-12 Take 1 Tab by mouth daily. furosemide 40 mg tablet Commonly known as:  LASIX Take 1 Tab by mouth daily. For fluid  
  
 l.acidoph-B.lactis-B.longum 460 mg (7.5-6- 1.5 bill. cell) Cap cap Commonly known as:  JGPDNFND6 Take 1 Cap by mouth Daily (before breakfast). losartan 50 mg tablet Commonly known as:  COZAAR Take 1 Tab by mouth daily. For blood pressure  
  
 metoprolol succinate 100 mg tablet Commonly known as:  TOPROL-XL Take 1 Tab by mouth daily. For blood pressure  
  
 ondansetron hcl 8 mg tablet Commonly known as:  Berna Coronado  
 Take 1 Tab by mouth every eight (8) hours as needed. For Nausea  Indications: GASTROPARESIS  
  
 oxyCODONE IR 5 mg immediate release tablet Commonly known as:  Payal Joni Take 1 tab in AM and HALF to 1 tab in PM if needed on days where back pain is severe  
  
 pantoprazole 40 mg tablet Commonly known as:  PROTONIX Take 1 Tab by mouth Daily (before breakfast). PLAQUENIL 200 mg tablet Generic drug:  hydroxychloroquine Take 200 mg by mouth two (2) times a day. predniSONE 10 mg tablet Commonly known as:  Vickie Aver Take 9 mg by mouth daily (with breakfast). Senna 8.6 mg tablet Generic drug:  senna Take 1 Tab by mouth daily as needed. VITAMIN D2 50,000 unit capsule Generic drug:  ergocalciferol Take 50,000 Units by mouth every seven (7) days. Follow-up Instructions Return if symptoms worsen or fail to improve. To-Do List   
 10/24/2017 Imaging:  XR CHEST PA LAT Patient Instructions Pneumonia: Care Instructions Your Care Instructions Pneumonia is an infection of the lungs. Most cases are caused by infections from bacteria or viruses. Pneumonia may be mild or very severe. If it is caused by bacteria, you will be treated with antibiotics. It may take a few weeks to a few months to recover fully from pneumonia, depending on how sick you were and whether your overall health is good. Follow-up care is a key part of your treatment and safety. Be sure to make and go to all appointments, and call your doctor if you are having problems. Its also a good idea to know your test results and keep a list of the medicines you take. How can you care for yourself at home? · Take your antibiotics exactly as directed. Do not stop taking the medicine just because you are feeling better. You need to take the full course of antibiotics. · Take your medicines exactly as prescribed.  Call your doctor if you think you are having a problem with your medicine. · Get plenty of rest and sleep. You may feel weak and tired for a while, but your energy level will improve with time. · To prevent dehydration, drink plenty of fluids, enough so that your urine is light yellow or clear like water. Choose water and other caffeine-free clear liquids until you feel better. If you have kidney, heart, or liver disease and have to limit fluids, talk with your doctor before you increase the amount of fluids you drink. · Take care of your cough so you can rest. A cough that brings up mucus from your lungs is common with pneumonia. It is one way your body gets rid of the infection. But if coughing keeps you from resting or causes severe fatigue and chest-wall pain, talk to your doctor. He or she may suggest that you take a medicine to reduce the cough. · Use a vaporizer or humidifier to add moisture to your bedroom. Follow the directions for cleaning the machine. · Do not smoke or allow others to smoke around you. Smoke will make your cough last longer. If you need help quitting, talk to your doctor about stop-smoking programs and medicines. These can increase your chances of quitting for good. · Take an over-the-counter pain medicine, such as acetaminophen (Tylenol), ibuprofen (Advil, Motrin), or naproxen (Aleve). Read and follow all instructions on the label. · Do not take two or more pain medicines at the same time unless the doctor told you to. Many pain medicines have acetaminophen, which is Tylenol. Too much acetaminophen (Tylenol) can be harmful. · If you were given a spirometer to measure how well your lungs are working, use it as instructed. This can help your doctor tell how your recovery is going. · To prevent pneumonia in the future, talk to your doctor about getting a flu vaccine (once a year) and a pneumococcal vaccine (one time only for most people). When should you call for help? Call 911 anytime you think you may need emergency care. For example, call if: 
· You have severe trouble breathing. Call your doctor now or seek immediate medical care if: 
· You cough up dark brown or bloody mucus (sputum). · You have new or worse trouble breathing. · You are dizzy or lightheaded, or you feel like you may faint. Watch closely for changes in your health, and be sure to contact your doctor if: 
· You have a new or higher fever. · You are coughing more deeply or more often. · You are not getting better after 2 days (48 hours). · You do not get better as expected. Where can you learn more? Go to http://jorge-rochelle.info/. Enter [id number] in the search box to learn more about \"Pneumonia: Care Instructions. \" Current as of: March 25, 2017 Content Version: 11.3 © 5041-4795 [employer]. Care instructions adapted under license by [employer] (which disclaims liability or warranty for this information). If you have questions about a medical condition or this instruction, always ask your healthcare professional. [place] [address] any warranty or liability for your use of this information. Introducing [facility]! Thompson Godinez introduces DataVote patient portal. Now you can access parts of your medical record, email your doctor's office, and request medication refills online. 1. In your internet browser, go to [email or web address]://[employer]. [employer] 2. Click on the First Time User? Click Here link in the Sign In box. You will see the New Member Sign Up page. 3. Enter your DataVote Access Code exactly as it appears below. You will not need to use this code after youve completed the sign-up process. If you do not sign up before the expiration date, you must request a new code. · DataVote Access Code: [email or web address] Expires: 11/16/2017 11:22 AM 
 
4.  Enter the last four digits of your Social Security Number (xxxx) and Date of Birth (mm/dd/yyyy) as indicated and click Submit. You will be taken to the next sign-up page. 5. Create a OBOOK ID. This will be your OBOOK login ID and cannot be changed, so think of one that is secure and easy to remember. 6. Create a OBOOK password. You can change your password at any time. 7. Enter your Password Reset Question and Answer. This can be used at a later time if you forget your password. 8. Enter your e-mail address. You will receive e-mail notification when new information is available in [address] 9. Click Sign Up. You can now view and download portions of your medical record. 10. Click the Download Summary menu link to download a portable copy of your medical information. If you have questions, please visit the Frequently Asked Questions section of the OBOOK website. Remember, OBOOK is NOT to be used for urgent needs. For medical emergencies, dial 911. Now available from your iPhone and Android! Please provide this summary of care documentation to your next provider. Your primary care clinician is listed as Cal Padilla. If you have any questions after today's visit, please call [phone].

## 2017-10-24 NOTE — PROGRESS NOTES
1. Have you been to the ER, urgent care clinic since your last visit? Hospitalized since your last visit? See below. 2. Have you seen or consulted any other health care providers outside of the [address] [place] [address] since your last visit? Include any pap smears or colon screening. No    Chief Complaint   Patient presents with   [facility] Follow Up     follow up from [address] [place] [address] 10/14/2017 for pneumonia.       fasting

## 2017-10-25 ENCOUNTER — TELEPHONE (OUTPATIENT)
Dept: RESPIRATORY THERAPY | Age: 32
End: 2017-10-25

## 2017-10-27 ENCOUNTER — APPOINTMENT (OUTPATIENT)
Dept: GENERAL RADIOLOGY | Age: 32
DRG: 193 | End: 2017-10-27
Attending: EMERGENCY MEDICINE
Payer: MEDICARE

## 2017-10-27 ENCOUNTER — HOSPITAL ENCOUNTER (INPATIENT)
Age: 32
LOS: 3 days | Discharge: HOME | DRG: 193 | End: 2017-10-30
Attending: STUDENT IN AN ORGANIZED HEALTH CARE EDUCATION/TRAINING PROGRAM | Admitting: INTERNAL MEDICINE
Payer: MEDICARE

## 2017-10-27 DIAGNOSIS — E87.6 HYPOKALEMIA: ICD-10-CM

## 2017-10-27 DIAGNOSIS — J18.9 PNEUMONIA OF LEFT LOWER LOBE DUE TO INFECTIOUS ORGANISM: Primary | ICD-10-CM

## 2017-10-27 LAB
ALBUMIN SERPL-MCNC: 2 G/DL (ref 3.5–5)
ALBUMIN/GLOB SERPL: 0.4 {RATIO} (ref 1.1–2.2)
ALP SERPL-CCNC: 64 U/L (ref 45–117)
ALT SERPL-CCNC: 15 U/L (ref 12–78)
ANION GAP SERPL CALC-SCNC: 10 MMOL/L (ref 5–15)
AST SERPL-CCNC: 19 U/L (ref 15–37)
BASOPHILS # BLD: 0 K/UL (ref 0–0.1)
BASOPHILS NFR BLD: 0 % (ref 0–1)
BILIRUB SERPL-MCNC: 0.3 MG/DL (ref 0.2–1)
BUN SERPL-MCNC: 24 MG/DL (ref 6–20)
BUN/CREAT SERPL: 2 (ref 12–20)
CALCIUM SERPL-MCNC: 9.1 MG/DL (ref 8.5–10.1)
CHLORIDE SERPL-SCNC: 94 MMOL/L (ref 97–108)
CO2 SERPL-SCNC: 30 MMOL/L (ref 21–32)
CREAT SERPL-MCNC: 9.91 MG/DL (ref 0.55–1.02)
EOSINOPHIL # BLD: 0 K/UL (ref 0–0.4)
EOSINOPHIL NFR BLD: 0 % (ref 0–7)
ERYTHROCYTE [DISTWIDTH] IN BLOOD BY AUTOMATED COUNT: 15 % (ref 11.5–14.5)
FLUAV AG NPH QL IA: NEGATIVE
FLUBV AG NOSE QL IA: NEGATIVE
GLOBULIN SER CALC-MCNC: 4.6 G/DL (ref 2–4)
GLUCOSE SERPL-MCNC: 97 MG/DL (ref 65–100)
HCT VFR BLD AUTO: 34.2 % (ref 35–47)
HGB BLD-MCNC: 11.2 G/DL (ref 11.5–16)
LACTATE SERPL-SCNC: 2.8 MMOL/L (ref 0.4–2)
LYMPHOCYTES # BLD: 2.7 K/UL (ref 0.8–3.5)
LYMPHOCYTES NFR BLD: 29 % (ref 12–49)
MCH RBC QN AUTO: 29.7 PG (ref 26–34)
MCHC RBC AUTO-ENTMCNC: 32.7 G/DL (ref 30–36.5)
MCV RBC AUTO: 90.7 FL (ref 80–99)
MONOCYTES # BLD: 0.5 K/UL (ref 0–1)
MONOCYTES NFR BLD: 6 % (ref 5–13)
NEUTS SEG # BLD: 6.1 K/UL (ref 1.8–8)
NEUTS SEG NFR BLD: 65 % (ref 32–75)
PLATELET # BLD AUTO: 322 K/UL (ref 150–400)
POTASSIUM SERPL-SCNC: 2.4 MMOL/L (ref 3.5–5.1)
PROT SERPL-MCNC: 6.6 G/DL (ref 6.4–8.2)
RBC # BLD AUTO: 3.77 M/UL (ref 3.8–5.2)
SODIUM SERPL-SCNC: 134 MMOL/L (ref 136–145)
WBC # BLD AUTO: 9.4 K/UL (ref 3.6–11)

## 2017-10-27 PROCEDURE — 87804 INFLUENZA ASSAY W/OPTIC: CPT | Performed by: STUDENT IN AN ORGANIZED HEALTH CARE EDUCATION/TRAINING PROGRAM

## 2017-10-27 PROCEDURE — 36415 COLL VENOUS BLD VENIPUNCTURE: CPT | Performed by: STUDENT IN AN ORGANIZED HEALTH CARE EDUCATION/TRAINING PROGRAM

## 2017-10-27 PROCEDURE — 80053 COMPREHEN METABOLIC PANEL: CPT | Performed by: EMERGENCY MEDICINE

## 2017-10-27 PROCEDURE — 74011250636 HC RX REV CODE- 250/636: Performed by: STUDENT IN AN ORGANIZED HEALTH CARE EDUCATION/TRAINING PROGRAM

## 2017-10-27 PROCEDURE — 65660000000 HC RM CCU STEPDOWN

## 2017-10-27 PROCEDURE — 71020 XR CHEST PA LAT: CPT

## 2017-10-27 PROCEDURE — 74011250637 HC RX REV CODE- 250/637: Performed by: INTERNAL MEDICINE

## 2017-10-27 PROCEDURE — 99284 EMERGENCY DEPT VISIT MOD MDM: CPT

## 2017-10-27 PROCEDURE — 65660000001 HC RM ICU INTERMED STEPDOWN

## 2017-10-27 PROCEDURE — 85025 COMPLETE CBC W/AUTO DIFF WBC: CPT | Performed by: EMERGENCY MEDICINE

## 2017-10-27 PROCEDURE — 74011250637 HC RX REV CODE- 250/637: Performed by: STUDENT IN AN ORGANIZED HEALTH CARE EDUCATION/TRAINING PROGRAM

## 2017-10-27 PROCEDURE — 87040 BLOOD CULTURE FOR BACTERIA: CPT | Performed by: STUDENT IN AN ORGANIZED HEALTH CARE EDUCATION/TRAINING PROGRAM

## 2017-10-27 PROCEDURE — 74011000258 HC RX REV CODE- 258: Performed by: STUDENT IN AN ORGANIZED HEALTH CARE EDUCATION/TRAINING PROGRAM

## 2017-10-27 PROCEDURE — 83605 ASSAY OF LACTIC ACID: CPT | Performed by: STUDENT IN AN ORGANIZED HEALTH CARE EDUCATION/TRAINING PROGRAM

## 2017-10-27 PROCEDURE — 96365 THER/PROPH/DIAG IV INF INIT: CPT

## 2017-10-27 PROCEDURE — 93005 ELECTROCARDIOGRAM TRACING: CPT

## 2017-10-27 RX ORDER — LEVOFLOXACIN 5 MG/ML
750 INJECTION, SOLUTION INTRAVENOUS ONCE
Status: COMPLETED | OUTPATIENT
Start: 2017-10-27 | End: 2017-10-27

## 2017-10-27 RX ORDER — POTASSIUM CHLORIDE 7.45 MG/ML
10 INJECTION INTRAVENOUS
Status: DISCONTINUED | OUTPATIENT
Start: 2017-10-27 | End: 2017-10-27

## 2017-10-27 RX ORDER — POTASSIUM CHLORIDE 750 MG/1
30 TABLET, FILM COATED, EXTENDED RELEASE ORAL
Status: COMPLETED | OUTPATIENT
Start: 2017-10-27 | End: 2017-10-27

## 2017-10-27 RX ORDER — POTASSIUM CHLORIDE 750 MG/1
10 TABLET, FILM COATED, EXTENDED RELEASE ORAL
Status: COMPLETED | OUTPATIENT
Start: 2017-10-27 | End: 2017-10-27

## 2017-10-27 RX ORDER — OXYCODONE HYDROCHLORIDE 5 MG/1
5 TABLET ORAL
Status: DISCONTINUED | OUTPATIENT
Start: 2017-10-27 | End: 2017-10-30 | Stop reason: HOSPADM

## 2017-10-27 RX ORDER — VANCOMYCIN 1.75 GRAM/500 ML IN 0.9 % SODIUM CHLORIDE INTRAVENOUS
1750 ONCE
Status: COMPLETED | OUTPATIENT
Start: 2017-10-27 | End: 2017-10-28

## 2017-10-27 RX ADMIN — OXYCODONE HYDROCHLORIDE 5 MG: 5 TABLET ORAL at 21:11

## 2017-10-27 RX ADMIN — POTASSIUM CHLORIDE 30 MEQ: 750 TABLET, FILM COATED, EXTENDED RELEASE ORAL at 20:40

## 2017-10-27 RX ADMIN — POTASSIUM CHLORIDE 10 MEQ: 750 TABLET, FILM COATED, EXTENDED RELEASE ORAL at 19:09

## 2017-10-27 RX ADMIN — SODIUM CHLORIDE 500 ML: 900 INJECTION, SOLUTION INTRAVENOUS at 19:25

## 2017-10-27 RX ADMIN — VANCOMYCIN HYDROCHLORIDE 1750 MG: 10 INJECTION, POWDER, LYOPHILIZED, FOR SOLUTION INTRAVENOUS at 23:09

## 2017-10-27 RX ADMIN — CLONIDINE HYDROCHLORIDE 0.3 MG: 0.2 TABLET ORAL at 21:11

## 2017-10-27 RX ADMIN — PIPERACILLIN SODIUM,TAZOBACTAM SODIUM 3.38 G: 3; .375 INJECTION, POWDER, FOR SOLUTION INTRAVENOUS at 22:33

## 2017-10-27 RX ADMIN — LEVOFLOXACIN 750 MG: 5 INJECTION, SOLUTION INTRAVENOUS at 20:13

## 2017-10-27 NOTE — ED TRIAGE NOTES
Pt arrives with cough x 1 month. Recently diagnosed with pneumonia but has had no improvement in her symptoms. Peritoneal dialysis patient.  in triage.

## 2017-10-27 NOTE — ED PROVIDER NOTES
HPI Comments: 33 y.o. female with past medical history significant for HTN, anemia, lupus, heart failure, carditis, long-term anticoagulant use, hypercholesterolemia, PE, asthma, peritoneal dialysis status, ESRD, DDD, and malignant HTN with CKD who presents to the ED with chief complaint of cough. Patient reports generalized body aches, fatigue, loss of appetite, and occasional nausea and vomiting with onset ~1 month ago. Patient also reports \"heart burn\" with recent onset. Patient reports being admitted to the hospital and being diagnosed with pneumonia ~10 days ago; reports being discharged home with a five-day course of Levaquin. Patient reports completing her course of Levaquin; reports continued symptoms. Patient reports undergoing peritoneal dialysis at home; reports the fluid has been \"clear. \" Patient denies cough, fever, SOB, and abdominal pain. There are no other acute medical concerns at this time. PCP: Ashley Flores NP  Nephrologist: Dr. Moses Rodriguez MD    Note written by Boy Horton, as dictated by Farhad Medina MD 6:39 PM     The history is provided by the patient.         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 ([address]     Peritoneal dialysis status ([address] 10/2015    Thromboembolus ([address] 2013    lungs       Past Surgical History:   Procedure Laterality Date    HX  SECTION  2006    HX OTHER SURGICAL  9/16/15    INSERTION PD CATH         Family History:   Problem Relation Age of Onset    Diabetes Father     Hypertension Father     Cancer Other      aunt with breast cancer    Diabetes Mother        Social History     Social History    Marital status: SINGLE     Spouse name: N/A    Number of children: N/A    Years of education: N/A     Occupational History    Not on file. Social History Main Topics    Smoking status: Never Smoker    Smokeless tobacco: Never Used    Alcohol use No    Drug use: Yes     Special: Prescription, OTC    Sexual activity: Not Currently     Other Topics Concern     Service No    Blood Transfusions No    Caffeine Concern No    Occupational Exposure No    Hobby Hazards No    Sleep Concern No    Stress Concern No    Weight Concern No    Special Diet No    Back Care Yes     low back pain    Exercise No    Bike Helmet No    Seat Belt Yes    Self-Exams No     Social History Narrative    Lives with parents and daughter. ALLERGIES: Compazine [prochlorperazine edisylate] and Fish containing products    Review of Systems   Constitutional: Positive for fatigue. Negative for chills and fever. HENT: Negative for sore throat. Respiratory: Negative for cough and shortness of breath. Cardiovascular: Negative for chest pain. Gastrointestinal: Positive for nausea and vomiting. Negative for abdominal pain. Genitourinary: Negative for dysuria. Musculoskeletal: Positive for myalgias. Negative for back pain. Skin: Negative for rash. Neurological: Negative for syncope and headaches. Psychiatric/Behavioral: Negative for confusion. All other systems reviewed and are negative. Vitals:    10/27/17 1740   BP: 123/87   Pulse: (!) 141   Resp: 18   Temp: 98.5 °F (36.9 °C)   SpO2: 98%   Weight: 69.7 kg (153 lb 9.6 oz)   Height: 5' 5\" (1.651 m)            Physical Exam   Constitutional: She is oriented to person, place, and time. She appears well-developed. No distress. Appears older than stated age.     HENT:   Head: Normocephalic and atraumatic. Eyes: Conjunctivae and EOM are normal. Pupils are equal, round, and reactive to light. Neck: Normal range of motion. Neck supple. Cardiovascular: Normal rate, regular rhythm and normal heart sounds. No murmur heard. Pulmonary/Chest: Effort normal and breath sounds normal. No respiratory distress. Abdominal: Soft. Bowel sounds are normal. She exhibits no distension. There is no tenderness. There is no rebound. Peritoneal drain present; no tenderness to palpation. Musculoskeletal: Normal range of motion. She exhibits no edema. Neurological: She is alert and oriented to person, place, and time. No cranial nerve deficit. She exhibits normal muscle tone. Coordination normal.   Skin: Skin is warm and dry. No rash noted. Psychiatric: She has a normal mood and affect. Her behavior is normal.   Nursing note and vitals reviewed. Note written by Boy Zabala, as dictated by Susana Gibson MD 6:48 PM      [facility]  ED Course       Procedures    ED EKG interpretation:  Rhythm: sinus tachycardia; and regular . Rate (approx.): 119 bpm; Axis: normal; ST/T wave: normal; normal intervals; no ischemia; No STEMI. Note written by Boy Zabala, as dictated by Susana Gibson MD 7:36 PM    CONSULT NOTE:  8:03 PM Susana Gibson MD spoke with Dr. Gisela Laws MD, Consult for Hospitalist. Discussed available diagnostic tests and clinical findings. Provider is in agreement with care plans as outlined. Dr. Gisela Laws MD will see and admit the patient.

## 2017-10-27 NOTE — IP AVS SNAPSHOT
[address] 
[phone] Patient: Marycruz Molina MRN: [id number] :1985 About your hospitalization You were admitted on:  2017 You last received care in the:  [address] You were discharged on:  2017 Why you were hospitalized Your primary diagnosis was:  Hypokalemia Your diagnoses also included:  Pneumonia, Hypomagnesemia, Hypocalcemia Things You Need To Do (next 8 weeks) Follow up with Dirk Contreras NP in 1 week(s)  
hospital follow up Phone:  [phone] Where:  [address] [address] Follow up with Joce Palm MD in 1 week(s)  
follow up for your lupus Phone:  [phone] Where:  [address] [facility] [address] [address] Follow up with Royal Valenzuela MD  
Follow up within 1 week to have your labs rechecked Phone:  [phone] Where:  [address], [address], [address] [address] Friday Nov 10, 2017 Follow Up with Timur Flores MD at 10:20 AM  
Where:  [facility]) Discharge Orders None A check fahad indicates which time of day the medication should be taken. My Medications STOP taking these medications   
 cloNIDine HCl 0.3 mg tablet Commonly known as:  CATAPRES  
   
  
 losartan 50 mg tablet Commonly known as:  COZAAR  
   
  
  
TAKE these medications as instructed Instructions Each Dose to Equal  
 Morning Noon Evening Bedtime  
 albuterol 90 mcg/actuation inhaler Commonly known as:  PROVENTIL HFA, VENTOLIN HFA, PROAIR HFA Your last dose was: Your next dose is: Take 1-2 Puffs by inhalation every four (4) hours as needed for Wheezing or Shortness of Breath. 1-2 Puff  
    
   
   
   
  
 allopurinol 100 mg tablet Commonly known as:  Ruby Cormier  
   
 Your last dose was: Your next dose is: Take 100 mg by mouth daily. 100 mg  
    
   
   
   
  
 amLODIPine 10 mg tablet Commonly known as:  Marcela Saldana Your last dose was: Your next dose is: Take 1 Tab by mouth daily. 10 mg  
    
   
   
   
  
 amoxicillin-clavulanate 875-125 mg per tablet Commonly known as:  AUGMENTIN Your last dose was: Your next dose is: Take 1 Tab by mouth every twelve (12) hours. Indications: pneumonia 1 Tab  
    
   
   
   
  
 apixaban 5 mg tablet Commonly known as:  Tracie Lu Your last dose was: Your next dose is: Take 2 Tabs by mouth two (2) times a day. For 6 days and then 1 tab twice daily 10 mg  
    
   
   
   
  
 AURYXIA 210 mg iron tablet Generic drug:  ferric citrate Your last dose was: Your next dose is:    
   
   
 TK 2 TS PO WITH MEALS  
     
   
   
   
  
 calcitRIOL 0.25 mcg capsule Commonly known as:  ROCALTROL Your last dose was: Your next dose is: Take 0.25 mcg by mouth daily. 0.25 mcg  
    
   
   
   
  
 cyanocobalamin 2,500 mcg sublingual tablet Commonly known as:  VITAMIN B-12 Your last dose was: Your next dose is: Take 1 Tab by mouth daily. 2500 mcg  
    
   
   
   
  
 l.acidoph-B.lactis-B.longum 460 mg (7.5-6- 1.5 bill. cell) Cap cap Commonly known as:  [id number] Your last dose was: Your next dose is: Take 1 Cap by mouth Daily (before breakfast). 1 Cap  
    
   
   
   
  
 magnesium oxide 400 mg tablet Commonly known as:  MAG-OX Your last dose was: Your next dose is: Take 1 Tab by mouth two (2) times a day for 30 days. 400 mg  
    
   
   
   
  
 metoprolol succinate 100 mg tablet Commonly known as:  TOPROL-XL Your last dose was: Your next dose is: Take 1 Tab by mouth daily. For blood pressure 100 mg  
    
   
   
   
  
 ondansetron hcl 8 mg tablet Commonly known as:  Erica Wang Your last dose was: Your next dose is: Take 1 Tab by mouth every eight (8) hours as needed. For Nausea  Indications: GASTROPARESIS  
 8 mg  
    
   
   
   
  
 oxyCODONE IR 5 mg immediate release tablet Commonly known as:  Elridever Dirosettao Your last dose was: Your next dose is: Take 1 tab in AM and HALF to 1 tab in PM if needed on days where back pain is severe  
     
   
   
   
  
 pantoprazole 40 mg tablet Commonly known as:  PROTONIX Your last dose was: Your next dose is: Take 1 Tab by mouth Daily (before breakfast). 40 mg  
    
   
   
   
  
 PLAQUENIL 200 mg tablet Generic drug:  hydroxychloroquine Your last dose was: Your next dose is: Take 200 mg by mouth two (2) times a day. 200 mg  
    
   
   
   
  
 potassium chloride 20 mEq tablet Commonly known as:  K-DUR, KLOR-CON Your last dose was: Your next dose is: Take 2 Tabs by mouth daily for 7 days. 40 mEq  
    
   
   
   
  
 predniSONE 10 mg tablet Commonly known as:  Paras Jama Your last dose was: Your next dose is: Take 9 mg by mouth daily (with breakfast). 9 mg Senna 8.6 mg tablet Generic drug:  senna Your last dose was: Your next dose is: Take 1 Tab by mouth daily as needed. 1 Tab VITAMIN D2 50,000 unit capsule Generic drug:  ergocalciferol Your last dose was: Your next dose is: Take 50,000 Units by mouth every seven (7) days. [address] Units Where to Get Your Medications Information on where to get these meds will be given to you by the nurse or doctor. ! Ask your nurse or doctor about these medications  
  albuterol 90 mcg/actuation inhaler  
 amoxicillin-clavulanate 875-125 mg per tablet  
 magnesium oxide 400 mg tablet  
 potassium chloride 20 mEq tablet Discharge Instructions Discharge Instructions PATIENT ID: Duane Carpenter MRN: [id number] YOB: 1985 DATE OF ADMISSION: 10/27/2017  6:03 PM   
DATE OF DISCHARGE: 10/30/2017 PRIMARY CARE PROVIDER: Sean Leonardo NP  
 
ATTENDING PHYSICIAN: Newton Brock MD 
DISCHARGING PROVIDER: Grecia Dhillon NP To contact this individual call [phone] and ask the  to page. If unavailable ask to be transferred the Adult Hospitalist Department. DISCHARGE DIAGNOSES Pneumonia, Hypokalemia, Hypomagnesia, Hypocalcemia CONSULTATIONS: IP CONSULT TO HOSPITALIST 
IP CONSULT TO NEPHROLOGY PROCEDURES/SURGERIES: * No surgery found * PENDING TEST RESULTS:  
At the time of discharge the following test results are still pending: none FOLLOW UP APPOINTMENTS:  
Follow-up Information Follow up With Details Comments Contact Info Sean Leonardo NP In 1 week hospital follow up [address] [place] [address] 
[phone] Madina Avilez MD In 1 week follow up for your lupus [address] [facility] [address] [place] [address] 
[phone] Israel Byrnes MD  Follow up within 1 week to have your labs rechecked [address] 
[address] 
[phone] ADDITIONAL CARE RECOMMENDATIONS:  
-Please see med list. You have been prescribed the following new medications Augmentin (antibiotic for possible pneumonia), Albuterol, Potassium and Magnesium.  See below for more information to include common side effects.  
-Recommending following up with your Lupus doctor as this could be contributing to your symptoms.  
-Follow up with Dr Lorie Gilford or your pcp to have your labs rechecked next week as your potassium, magnesium, and calcium were low here.  
-Continue home PD schedule as directed DIET: Renal  
 
ACTIVITY: Activity as tolerated WOUND CARE: none EQUIPMENT needed: none DISCHARGE MEDICATIONS: 
Magnesium (By mouth) Used as a mineral supplement to add to the magnesium in your daily diet. Brand Name(s): CalMag Thins, Coral Calcium, GNC Calcium/Magnesium with Vitamin D, Leader Magnesium, MG Plus Protein, MP Magnesium, Mag-G, Mag-Tab SR, Magimin-Forte, Maginex, Magnacaps, PharmAssure Magnesium, April Lei, [employer] Magnesium, [employer] Natural Magnesium There may be other brand names for this medicine. When This Medicine Should Not Be Used:  
Unless your doctor tells you otherwise, there is no reason for you to not use this medicine. How to Use This Medicine:  
Powder for Suspension, Liquid Filled Capsule, Capsule, Powder, Liquid, Tablet, Packet, Long Acting Tablet · Your doctor will tell you how much medicine to use. Do not use more than directed. · Follow the instructions on the medicine label if you are using this medicine without a prescription. · Drink at least six to eight (8 ounce) cups of liquid each day, unless your doctor tells you otherwise. · Measure the oral liquid medicine with a marked measuring spoon, oral syringe, or medicine cup. If a dose is missed: · Take a dose as soon as you remember. If it is almost time for your next dose, wait until then and take a regular dose. Do not take extra medicine to make up for a missed dose. How to Store and Dispose of This Medicine: · Store the medicine in a closed container at room temperature, away from heat, moisture, and direct light. · Keep all medicine out of the reach of children. Never share your medicine with anyone. · Ask your pharmacist, doctor, or health caregiver about the best way to dispose of any outdated medicine or medicine no longer needed. Drugs and Foods to Avoid: Ask your doctor or pharmacist before using any other medicine, including over-the-counter medicines, vitamins, and herbal products. · There are other medicines that may not work properly if you use them together with magnesium. Make sure your doctor knows about all other medicines you are using. Warnings While Using This Medicine: · Make sure your doctor knows if you are pregnant or breast feeding, or if you have kidney disease. Possible Side Effects While Using This Medicine: If you notice these less serious side effects, talk with your doctor: · Nausea, diarrhea. If you notice other side effects that you think are caused by this medicine, tell your doctor. Call your doctor for medical advice about side effects. You may report side effects to FDA at 4-390-FDA-5268 © 2017 [address]  Information is for End User's use only and may not be sold, redistributed or otherwise used for commercial purposes. The above information is an  only. It is not intended as medical advice for individual conditions or treatments. Talk to your doctor, nurse or pharmacist before following any medical regimen to see if it is safe and effective for you. Amoxicillin/Clavulanate Potassium (By mouth) Amoxicillin (w-vnf-s-LEONELA-in), Clavulanate Potassium (NFGW-wi-aq-gatito xgp-OYS-wv-um) Treats infections. This medicine is a penicillin antibiotic. Brand Name(s): Augmentin, Augmentin ES-600, Augmentin XR There may be other brand names for this medicine. When This Medicine Should Not Be Used: This medicine is not right for everyone. Do not use it if you had an allergic reaction to amoxicillin, clavulanate, or a similar antibiotic (penicillin or cephalosporin), or if you had liver problems caused by Augmentin®. How to Use This Medicine:  
Liquid, Tablet, Chewable Tablet, Long Acting Tablet · Your doctor will tell you how much medicine to use. Do not use more than directed. · Take this medicine with a snack or at the beginning of a meal to help prevent nausea. · Chewable tablets: Chew the tablet completely before you swallow it. · Measure the oral liquid medicine with a marked measuring spoon, oral syringe, or medicine cup. Shake the medicine well just before you measure each dose. Rinse the spoon or dropper after each use. · Swallow the extended-release tablet whole. Do not crush, break, or chew it. · Take all of the medicine in your prescription to clear up your infection, even if you feel better after the first few doses. · Missed dose: Take a dose as soon as you remember. If it is almost time for your next dose, wait until then and take a regular dose. Do not take extra medicine to make up for a missed dose. · Tablet, extended-release tablet, chewable tablet: Store at room temperature, away from heat, moisture, and direct light. · Oral liquid: Store in the refrigerator. Do not freeze. · Throw away any unused oral liquid after 10 days. Drugs and Foods to Avoid: Ask your doctor or pharmacist before using any other medicine, including over-the-counter medicines, vitamins, and herbal products. · Some medicines can affect how this medicine works. Tell your doctor if you are taking a blood thinner (such as warfarin), allopurinol, or probenecid. Warnings While Using This Medicine: · Tell your doctor if you are pregnant or breastfeeding, or if you have kidney disease, liver disease, or mononucleosis (mono). · Birth control pills may not work as well while you are taking this medicine. Use another form of birth control to prevent pregnancy. · This medicine can cause diarrhea. Call your doctor if the diarrhea becomes severe, does not stop, or is bloody. Do not take any medicine to stop diarrhea until you have talked to your doctor. Diarrhea can occur 2 months or more after you stop taking this medicine.  
· Tell any doctor or dentist who treats you that you are using this medicine. This medicine may affect certain medical test results. · Call your doctor if your symptoms do not improve or if they get worse. · The chewable tablet and oral liquid contain phenylalanine. Talk to your doctor before you use this medicine if you have phenylketonuria (PKU). · Keep all medicine out of the reach of children. Never share your medicine with anyone. Possible Side Effects While Using This Medicine:  
Call your doctor right away if you notice any of these side effects: · Allergic reaction: Itching or hives, swelling in your face or hands, swelling or tingling in your mouth or throat, chest tightness, trouble breathing · Blistering, peeling, red skin rash · Change in how much or how often you urinate · Dark urine or pale stools, nausea, vomiting, loss of appetite, stomach pain, yellow eyes or skin · Diarrhea that may contain blood, stomach cramps If you notice these less serious side effects, talk with your doctor: · Diaper rash · Mild diarrhea, nausea, vomiting · Tooth discoloration (in children) If you notice other side effects that you think are caused by this medicine, tell your doctor. Call your doctor for medical advice about side effects. You may report side effects to FDA at 8-407-FDA-7988 © 2017 [facility] Information is for End User's use only and may not be sold, redistributed or otherwise used for commercial purposes. The above information is an  only. It is not intended as medical advice for individual conditions or treatments. Talk to your doctor, nurse or pharmacist before following any medical regimen to see if it is safe and effective for you. See Medication Reconciliation Form · It is important that you take the medication exactly as they are prescribed. · Keep your medication in the bottles provided by the pharmacist and keep a list of the medication names, dosages, and times to be taken in your wallet. · Do not take other medications without consulting your doctor. NOTIFY YOUR PHYSICIAN FOR ANY OF THE FOLLOWING:  
Fever over 101 degrees for 24 hours. Chest pain, shortness of breath, fever, chills, nausea, vomiting, diarrhea, change in mentation, falling, weakness, bleeding. Severe pain or pain not relieved by medications. Or, any other signs or symptoms that you may have questions about. DISPOSITION: 
X  Home With: 
 OT  PT  [facility]  RN  
  
 SNF/Inpatient Rehab/LTAC Independent/assisted living Hospice Other: PROBLEM LIST Updated: Yes X Signed:  
Saundra Van NP 
10/30/2017 
3:22 PM 
 
ACO Transitions of Care Introducing [employer] [address] Ekaterina Krishnamurthy offers a voluntary care coordination program to provide high quality service and care to [facility] fee-for-service beneficiaries. Varun Baldwin was designed to help you enhance your health and well-being through the following services: ? Transitions of Care  support for individuals who are transitioning from one care setting to another (example: Hospital to home). ? Chronic and Complex Care Coordination  support for individuals and caregivers of those with serious or chronic illnesses or with more than one chronic (ongoing) condition and those who take a number of different medications. If you meet specific medical criteria, a [facility] may call you directly to coordinate your care with your primary care physician and your other care providers. For questions about the [facility] programs, please, contact your physicians office. For general questions or additional information about Accountable Care Organizations: 
Please visit www.medicare.gov/acos. html or call 1-800-MEDICARE (4-265.428.3129) TTY users should call 1-469.697.9933. [email or web address] Announcement We are excited to announce that we are making your provider's discharge notes available to you in [employer]. You will see these notes when they are completed and signed by the physician that discharged you from your recent hospital stay. If you have any questions or concerns about any information you see in [employer], please call the Health Information Department where you were seen or reach out to your Primary Care Provider for more information about your plan of care. Introducing [facility]! Robert Chavira introduces [employer] patient portal. Now you can access parts of your medical record, email your doctor's office, and request medication refills online. 1. In your internet browser, go to [email or web address] 2. Click on the First Time User? Click Here link in the Sign In box. You will see the New Member Sign Up page. 3. Enter your [employer] Access Code exactly as it appears below. You will not need to use this code after youve completed the sign-up process. If you do not sign up before the expiration date, you must request a new code. · [employer] Access Code: [email or web address] Expires: 11/16/2017 11:22 AM 
 
4. Enter the last four digits of your Social Security Number (xxxx) and Date of Birth (mm/dd/yyyy) as indicated and click Submit. You will be taken to the next sign-up page. 5. Create a [employer] ID. This will be your [employer] login ID and cannot be changed, so think of one that is secure and easy to remember. 6. Create a [employer] password. You can change your password at any time. 7. Enter your Password Reset Question and Answer. This can be used at a later time if you forget your password. 8. Enter your e-mail address. You will receive e-mail notification when new information is available in [address] 9. Click Sign Up. You can now view and download portions of your medical record.  
10. Click the Download Summary menu link to download a portable copy of your medical information. If you have questions, please visit the Frequently Asked Questions section of the MyChart website. Remember, MyChart is NOT to be used for urgent needs. For medical emergencies, dial 911. Now available from your iPhone and Android! Unresulted Labs-Please follow up with your PCP about these lab tests Order Current Status CULTURE, BLOOD, PAIRED Preliminary result Providers Seen During Your Hospitalization Provider Specialty Primary office phone Logan Simpson MD Emergency Medicine [phone] Amanda Hawkins MD Hospitalist [phone] Huy Dugan MD Internal Medicine [phone] Yuval Paz MD Hospitalist [phone] Prema Houston MD Internal Medicine [phone] Your Primary Care Physician (PCP) Primary Care Physician Office Phone Office Fax Momo Bertrand [phone] You are allergic to the following Allergen Reactions Compazine (Prochlorperazine Edisylate) Nausea and Vomiting Palpitations Fish Containing Products Rash An itchy rash appeared in about 1 hour Recent Documentation Height Weight BMI OB Status Smoking Status 1.651 m 77.4 kg 28.4 kg/m2 Medically Induced Never Smoker Emergency Contacts Name Discharge Info Relation Home Work [facility] [address] CAREGIVER [3] Parent [1] [phone] Patient Belongings The following personal items are in your possession at time of discharge: 
     Visual Aid: None      Home Medications: Sent home   Jewelry: Bracelet  Clothing: At bedside, Pants, Shirt    Other Valuables: At bedside, Cell Phone Please provide this summary of care documentation to your next provider. Signatures-by signing, you are acknowledging that this After Visit Summary has been reviewed with you and you have received a copy.   
  
 
  
    
    
 Patient Signature: ____________________________________________________________ Date:  ____________________________________________________________  
  
Marvetta Land Provider Signature:  ____________________________________________________________ Date:  ____________________________________________________________

## 2017-10-27 NOTE — PROGRESS NOTES
Admission Medication Reconciliation:    Information obtained from: Patient    Significant PMH/Disease States:   Past Medical History:   Diagnosis Date    Anemia     secondary to lupus    Asthma     no inhaler use in past 2 to 3 years    Carditis     Chronic pain     DDD (degenerative disc disease), lumbar     [facility]     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 2013    lungs       Chief Complaint for this Admission:  Pneumonia    Allergies:  Compazine [prochlorperazine edisylate] and Fish containing products    Prior to Admission Medications:   Prior to Admission Medications   Prescriptions Last Dose Informant Patient Reported? Taking? AURYXIA 210 mg iron tablet 10/27/2017 at am  Yes Yes   Sig: TK 2 TS PO WITH MEALS   allopurinol (ZYLOPRIM) 100 mg tablet 10/27/2017 at am  Yes Yes   Sig: Take 100 mg by mouth daily. amLODIPine (NORVASC) 10 mg tablet 10/27/2017 at am  No Yes   Sig: Take 1 Tab by mouth daily. apixaban (ELIQUIS) 5 mg tablet 10/27/2017 at am  No Yes   Sig: Take 2 Tabs by mouth two (2) times a day. For 6 days and then 1 tab twice daily   calcitRIOL (ROCALTROL) 0.25 mcg capsule 10/27/2017 at am  Yes Yes   Sig: Take 0.25 mcg by mouth daily. cloNIDine HCl (CATAPRES) 0.3 mg tablet 10/26/2017 at pm  No Yes   Sig: Take 1 Tab by mouth two (2) times a day. For blood pressure   Patient taking differently: Take 0.3 mg by mouth three (3) times daily. For blood pressure   cyanocobalamin (VITAMIN B-12) 2,500 mcg sublingual tablet 10/27/2017 at am  No Yes   Sig: Take 1 Tab by mouth daily.    ergocalciferol (VITAMIN D2) 50,000 unit capsule 10/27/2017 at am  Yes Yes   Sig: Take 50,000 Units by mouth every seven (7) days. hydroxychloroquine (PLAQUENIL) 200 mg tablet 10/27/2017 at am  Yes Yes   Sig: Take 200 mg by mouth two (2) times a day. l.acidoph-B.lactis-B.longum (FLORAJEN3) 460 mg (7.5-6- 1.5 bill. cell) cap cap 10/27/2017 at am  No Yes   Sig: Take 1 Cap by mouth Daily (before breakfast). losartan (COZAAR) 50 mg tablet 10/27/2017 at am  No Yes   Sig: Take 1 Tab by mouth daily. For blood pressure   metoprolol succinate (TOPROL-XL) 100 mg tablet 10/27/2017 at am  No Yes   Sig: Take 1 Tab by mouth daily. For blood pressure   ondansetron hcl (ZOFRAN) 8 mg tablet 10/27/2017 at am  No Yes   Sig: Take 1 Tab by mouth every eight (8) hours as needed. For Nausea  Indications: GASTROPARESIS   oxyCODONE IR (ROXICODONE) 5 mg immediate release tablet 10/27/2017 at am  No Yes   Sig: Take 1 tab in AM and HALF to 1 tab in PM if needed on days where back pain is severe      pantoprazole (PROTONIX) 40 mg tablet 10/27/2017 at am  No Yes   Sig: Take 1 Tab by mouth Daily (before breakfast). predniSONE (DELTASONE) 10 mg tablet 10/27/2017 at am  Yes Yes   Sig: Take 9 mg by mouth daily (with breakfast). senna (SENNA) 8.6 mg tablet 10/27/2017 at am  Yes Yes   Sig: Take 1 Tab by mouth daily as needed. Facility-Administered Medications: None         Comments/Recommendations: Patient is excellent historian, have interviewed her and her Mother at prior admission. Allergies were reviewed and confirmed, no changes. Asked mother to please bring Renée Gibbs to hospital (non-formulary). Deleted:  1. Furosemide (anuric)    Thank you for allowing me to participate in the care of your patient. Marielena Guajardo PharmD, RN #[id number]  Patient is on peritoneal dialysis, has been for about two years, does her exchanges nightly.

## 2017-10-28 LAB
ANION GAP SERPL CALC-SCNC: 8 MMOL/L (ref 5–15)
ARTERIAL PATENCY WRIST A: ABNORMAL
ATRIAL RATE: 119 BPM
BASE EXCESS BLD CALC-SCNC: 4 MMOL/L
BDY SITE: ABNORMAL
BUN SERPL-MCNC: 23 MG/DL (ref 6–20)
BUN/CREAT SERPL: 3 (ref 12–20)
CA-I BLD-SCNC: 1.27 MMOL/L (ref 1.12–1.32)
CALCIUM SERPL-MCNC: 6.2 MG/DL (ref 8.5–10.1)
CALCULATED P AXIS, ECG09: 36 DEGREES
CALCULATED R AXIS, ECG10: 28 DEGREES
CALCULATED T AXIS, ECG11: -76 DEGREES
CHLORIDE SERPL-SCNC: 103 MMOL/L (ref 97–108)
CO2 SERPL-SCNC: 27 MMOL/L (ref 21–32)
CREAT SERPL-MCNC: 9.12 MG/DL (ref 0.55–1.02)
DIAGNOSIS, 93000: NORMAL
ERYTHROCYTE [DISTWIDTH] IN BLOOD BY AUTOMATED COUNT: 15 % (ref 11.5–14.5)
GAS FLOW.O2 O2 DELIVERY SYS: ABNORMAL L/MIN
GLUCOSE SERPL-MCNC: 65 MG/DL (ref 65–100)
HCO3 BLD-SCNC: 29.6 MMOL/L (ref 22–26)
HCT VFR BLD AUTO: 25.6 % (ref 35–47)
HGB BLD-MCNC: 8.3 G/DL (ref 11.5–16)
LACTATE SERPL-SCNC: 1.9 MMOL/L (ref 0.4–2)
MAGNESIUM SERPL-MCNC: 1.2 MG/DL (ref 1.6–2.4)
MCH RBC QN AUTO: 29.6 PG (ref 26–34)
MCHC RBC AUTO-ENTMCNC: 32.4 G/DL (ref 30–36.5)
MCV RBC AUTO: 91.4 FL (ref 80–99)
P-R INTERVAL, ECG05: 112 MS
PCO2 BLD: 54.4 MMHG (ref 35–45)
PH BLD: 7.34 [PH] (ref 7.35–7.45)
PLATELET # BLD AUTO: 233 K/UL (ref 150–400)
PO2 BLD: 32 MMHG (ref 80–100)
POTASSIUM SERPL-SCNC: 3.1 MMOL/L (ref 3.5–5.1)
Q-T INTERVAL, ECG07: 310 MS
QRS DURATION, ECG06: 78 MS
QTC CALCULATION (BEZET), ECG08: 436 MS
RBC # BLD AUTO: 2.8 M/UL (ref 3.8–5.2)
SAO2 % BLD: 57 % (ref 92–97)
SODIUM SERPL-SCNC: 138 MMOL/L (ref 136–145)
SPECIMEN TYPE: ABNORMAL
VENTRICULAR RATE, ECG03: 119 BPM
WBC # BLD AUTO: 7.3 K/UL (ref 3.6–11)

## 2017-10-28 PROCEDURE — 85027 COMPLETE CBC AUTOMATED: CPT | Performed by: INTERNAL MEDICINE

## 2017-10-28 PROCEDURE — 74011250637 HC RX REV CODE- 250/637: Performed by: INTERNAL MEDICINE

## 2017-10-28 PROCEDURE — 83735 ASSAY OF MAGNESIUM: CPT | Performed by: INTERNAL MEDICINE

## 2017-10-28 PROCEDURE — 74011000258 HC RX REV CODE- 258: Performed by: HOSPITALIST

## 2017-10-28 PROCEDURE — 36415 COLL VENOUS BLD VENIPUNCTURE: CPT | Performed by: INTERNAL MEDICINE

## 2017-10-28 PROCEDURE — 82803 BLOOD GASES ANY COMBINATION: CPT

## 2017-10-28 PROCEDURE — 74011000258 HC RX REV CODE- 258: Performed by: INTERNAL MEDICINE

## 2017-10-28 PROCEDURE — 74011250636 HC RX REV CODE- 250/636: Performed by: INTERNAL MEDICINE

## 2017-10-28 PROCEDURE — 74011636637 HC RX REV CODE- 636/637: Performed by: INTERNAL MEDICINE

## 2017-10-28 PROCEDURE — 74011250636 HC RX REV CODE- 250/636: Performed by: HOSPITALIST

## 2017-10-28 PROCEDURE — 83605 ASSAY OF LACTIC ACID: CPT | Performed by: INTERNAL MEDICINE

## 2017-10-28 PROCEDURE — 80048 BASIC METABOLIC PNL TOTAL CA: CPT | Performed by: INTERNAL MEDICINE

## 2017-10-28 PROCEDURE — A4722 DIALYS SOL FLD VOL > 1999CC: HCPCS | Performed by: INTERNAL MEDICINE

## 2017-10-28 PROCEDURE — 74011250637 HC RX REV CODE- 250/637: Performed by: HOSPITALIST

## 2017-10-28 PROCEDURE — 65660000001 HC RM ICU INTERMED STEPDOWN

## 2017-10-28 RX ORDER — AMLODIPINE BESYLATE 5 MG/1
10 TABLET ORAL DAILY
Status: DISCONTINUED | OUTPATIENT
Start: 2017-10-28 | End: 2017-10-30 | Stop reason: HOSPADM

## 2017-10-28 RX ORDER — OXYCODONE AND ACETAMINOPHEN 5; 325 MG/1; MG/1
1 TABLET ORAL
Status: DISCONTINUED | OUTPATIENT
Start: 2017-10-28 | End: 2017-10-30 | Stop reason: HOSPADM

## 2017-10-28 RX ORDER — CALCIUM CARBONATE 200(500)MG
200 TABLET,CHEWABLE ORAL ONCE
Status: COMPLETED | OUTPATIENT
Start: 2017-10-28 | End: 2017-10-28

## 2017-10-28 RX ORDER — SODIUM CHLORIDE 9 MG/ML
75 INJECTION, SOLUTION INTRAVENOUS CONTINUOUS
Status: DISPENSED | OUTPATIENT
Start: 2017-10-28 | End: 2017-10-28

## 2017-10-28 RX ORDER — LEVOFLOXACIN 5 MG/ML
500 INJECTION, SOLUTION INTRAVENOUS
Status: DISCONTINUED | OUTPATIENT
Start: 2017-10-29 | End: 2017-10-30 | Stop reason: ALTCHOICE

## 2017-10-28 RX ORDER — SODIUM CHLORIDE 0.9 % (FLUSH) 0.9 %
5-10 SYRINGE (ML) INJECTION AS NEEDED
Status: DISCONTINUED | OUTPATIENT
Start: 2017-10-28 | End: 2017-10-30 | Stop reason: HOSPADM

## 2017-10-28 RX ORDER — ONDANSETRON 2 MG/ML
4 INJECTION INTRAMUSCULAR; INTRAVENOUS
Status: DISCONTINUED | OUTPATIENT
Start: 2017-10-28 | End: 2017-10-30 | Stop reason: HOSPADM

## 2017-10-28 RX ORDER — LEVOFLOXACIN 5 MG/ML
750 INJECTION, SOLUTION INTRAVENOUS EVERY 24 HOURS
Status: DISCONTINUED | OUTPATIENT
Start: 2017-10-28 | End: 2017-10-28 | Stop reason: DRUGHIGH

## 2017-10-28 RX ORDER — MORPHINE SULFATE 2 MG/ML
1 INJECTION, SOLUTION INTRAMUSCULAR; INTRAVENOUS ONCE
Status: COMPLETED | OUTPATIENT
Start: 2017-10-28 | End: 2017-10-28

## 2017-10-28 RX ORDER — LANOLIN ALCOHOL/MO/W.PET/CERES
400 CREAM (GRAM) TOPICAL 2 TIMES DAILY
Status: DISCONTINUED | OUTPATIENT
Start: 2017-10-28 | End: 2017-10-30 | Stop reason: HOSPADM

## 2017-10-28 RX ORDER — LANOLIN ALCOHOL/MO/W.PET/CERES
2500 CREAM (GRAM) TOPICAL DAILY
Status: DISCONTINUED | OUTPATIENT
Start: 2017-10-28 | End: 2017-10-30 | Stop reason: HOSPADM

## 2017-10-28 RX ORDER — CALCITRIOL 0.25 UG/1
0.25 CAPSULE ORAL DAILY
Status: DISCONTINUED | OUTPATIENT
Start: 2017-10-28 | End: 2017-10-30 | Stop reason: HOSPADM

## 2017-10-28 RX ORDER — MAGNESIUM SULFATE HEPTAHYDRATE 40 MG/ML
2 INJECTION, SOLUTION INTRAVENOUS ONCE
Status: COMPLETED | OUTPATIENT
Start: 2017-10-28 | End: 2017-10-28

## 2017-10-28 RX ORDER — PANTOPRAZOLE SODIUM 40 MG/1
40 TABLET, DELAYED RELEASE ORAL
Status: DISCONTINUED | OUTPATIENT
Start: 2017-10-28 | End: 2017-10-30 | Stop reason: HOSPADM

## 2017-10-28 RX ORDER — ALBUTEROL SULFATE 0.83 MG/ML
2.5 SOLUTION RESPIRATORY (INHALATION)
Status: DISCONTINUED | OUTPATIENT
Start: 2017-10-28 | End: 2017-10-30 | Stop reason: HOSPADM

## 2017-10-28 RX ORDER — METOPROLOL SUCCINATE 50 MG/1
100 TABLET, EXTENDED RELEASE ORAL DAILY
Status: DISCONTINUED | OUTPATIENT
Start: 2017-10-28 | End: 2017-10-30 | Stop reason: HOSPADM

## 2017-10-28 RX ORDER — ALLOPURINOL 100 MG/1
100 TABLET ORAL DAILY
Status: DISCONTINUED | OUTPATIENT
Start: 2017-10-28 | End: 2017-10-30 | Stop reason: HOSPADM

## 2017-10-28 RX ORDER — SENNOSIDES 8.6 MG/1
1 TABLET ORAL
Status: DISCONTINUED | OUTPATIENT
Start: 2017-10-28 | End: 2017-10-30 | Stop reason: HOSPADM

## 2017-10-28 RX ORDER — SODIUM CHLORIDE 0.9 % (FLUSH) 0.9 %
5-10 SYRINGE (ML) INJECTION EVERY 8 HOURS
Status: DISCONTINUED | OUTPATIENT
Start: 2017-10-28 | End: 2017-10-30 | Stop reason: HOSPADM

## 2017-10-28 RX ORDER — POTASSIUM CHLORIDE 7.45 MG/ML
10 INJECTION INTRAVENOUS
Status: DISPENSED | OUTPATIENT
Start: 2017-10-28 | End: 2017-10-28

## 2017-10-28 RX ORDER — POTASSIUM CHLORIDE 7.45 MG/ML
10 INJECTION INTRAVENOUS
Status: COMPLETED | OUTPATIENT
Start: 2017-10-28 | End: 2017-10-28

## 2017-10-28 RX ORDER — ERGOCALCIFEROL 1.25 MG/1
50000 CAPSULE ORAL
Status: DISCONTINUED | OUTPATIENT
Start: 2017-11-02 | End: 2017-10-28

## 2017-10-28 RX ORDER — HYDROXYCHLOROQUINE SULFATE 200 MG/1
200 TABLET, FILM COATED ORAL 2 TIMES DAILY
Status: DISCONTINUED | OUTPATIENT
Start: 2017-10-28 | End: 2017-10-30 | Stop reason: HOSPADM

## 2017-10-28 RX ADMIN — CALCITRIOL 0.25 MCG: 0.25 CAPSULE, LIQUID FILLED ORAL at 08:22

## 2017-10-28 RX ADMIN — APIXABAN 5 MG: 5 TABLET, FILM COATED ORAL at 14:59

## 2017-10-28 RX ADMIN — POTASSIUM CHLORIDE 10 MEQ: 10 INJECTION, SOLUTION INTRAVENOUS at 04:01

## 2017-10-28 RX ADMIN — APIXABAN 5 MG: 5 TABLET, FILM COATED ORAL at 20:46

## 2017-10-28 RX ADMIN — HYDROXYCHLOROQUINE SULFATE 200 MG: 200 TABLET, FILM COATED ORAL at 17:52

## 2017-10-28 RX ADMIN — OXYCODONE HYDROCHLORIDE AND ACETAMINOPHEN 1 TABLET: 5; 325 TABLET ORAL at 10:59

## 2017-10-28 RX ADMIN — POTASSIUM CHLORIDE 10 MEQ: 10 INJECTION, SOLUTION INTRAVENOUS at 08:19

## 2017-10-28 RX ADMIN — METOPROLOL SUCCINATE 100 MG: 50 TABLET, EXTENDED RELEASE ORAL at 08:22

## 2017-10-28 RX ADMIN — PREDNISONE 9 MG: 1 TABLET ORAL at 08:23

## 2017-10-28 RX ADMIN — CLONIDINE HYDROCHLORIDE 0.3 MG: 0.2 TABLET ORAL at 08:22

## 2017-10-28 RX ADMIN — EPOETIN ALFA 10000 UNITS: 10000 SOLUTION INTRAVENOUS; SUBCUTANEOUS at 18:03

## 2017-10-28 RX ADMIN — MAGNESIUM SULFATE HEPTAHYDRATE 2 G: 40 INJECTION, SOLUTION INTRAVENOUS at 14:10

## 2017-10-28 RX ADMIN — AMLODIPINE BESYLATE 10 MG: 5 TABLET ORAL at 08:22

## 2017-10-28 RX ADMIN — HYDROXYCHLOROQUINE SULFATE 200 MG: 200 TABLET, FILM COATED ORAL at 08:22

## 2017-10-28 RX ADMIN — CALCIUM CARBONATE (ANTACID) CHEW TAB 500 MG 200 MG: 500 CHEW TAB at 03:59

## 2017-10-28 RX ADMIN — MORPHINE SULFATE 1 MG: 2 INJECTION, SOLUTION INTRAMUSCULAR; INTRAVENOUS at 03:55

## 2017-10-28 RX ADMIN — Medication 10 ML: at 14:10

## 2017-10-28 RX ADMIN — ALLOPURINOL 100 MG: 100 TABLET ORAL at 08:22

## 2017-10-28 RX ADMIN — Medication 2500 MCG: at 08:21

## 2017-10-28 RX ADMIN — SODIUM CHLORIDE 250 ML: 900 INJECTION, SOLUTION INTRAVENOUS at 16:40

## 2017-10-28 RX ADMIN — SODIUM CHLORIDE 75 ML/HR: 900 INJECTION, SOLUTION INTRAVENOUS at 04:05

## 2017-10-28 RX ADMIN — POTASSIUM CHLORIDE 10 MEQ: 10 INJECTION, SOLUTION INTRAVENOUS at 05:56

## 2017-10-28 RX ADMIN — PANTOPRAZOLE SODIUM 40 MG: 40 TABLET, DELAYED RELEASE ORAL at 06:50

## 2017-10-28 RX ADMIN — Medication 400 MG: at 09:21

## 2017-10-28 RX ADMIN — PIPERACILLIN SODIUM,TAZOBACTAM SODIUM 4.5 G: 4; .5 INJECTION, POWDER, FOR SOLUTION INTRAVENOUS at 16:39

## 2017-10-28 RX ADMIN — Medication 10 ML: at 04:04

## 2017-10-28 RX ADMIN — Medication 400 MG: at 18:03

## 2017-10-28 RX ADMIN — SODIUM CHLORIDE, SODIUM LACTATE, CALCIUM CHLORIDE, MAGNESIUM CHLORIDE AND DEXTROSE 2000 ML: 1.5; 538; 448; 18.3; 5.08 INJECTION, SOLUTION INTRAPERITONEAL at 22:19

## 2017-10-28 RX ADMIN — Medication 10 ML: at 22:27

## 2017-10-28 RX ADMIN — OXYCODONE HYDROCHLORIDE 5 MG: 5 TABLET ORAL at 20:46

## 2017-10-28 RX ADMIN — CALCIUM GLUCONATE 1 G: 94 INJECTION, SOLUTION INTRAVENOUS at 09:22

## 2017-10-28 RX ADMIN — Medication 1 CAPSULE: at 08:22

## 2017-10-28 RX ADMIN — PIPERACILLIN SODIUM,TAZOBACTAM SODIUM 4.5 G: 4; .5 INJECTION, POWDER, FOR SOLUTION INTRAVENOUS at 09:22

## 2017-10-28 RX ADMIN — POTASSIUM CHLORIDE 10 MEQ: 10 INJECTION, SOLUTION INTRAVENOUS at 09:21

## 2017-10-28 NOTE — PROGRESS NOTES
Pt skin in tact. Duel assessment done by April and Marge Edwards RN. Pt has concerns about pain control, doesn't know the dose of her at home eliquis. Pt sleeping quietly in bed.   Marge Edwards

## 2017-10-28 NOTE — H&P
[address] [place] [address]   HISTORY AND PHYSICAL       Name:  Janet Pantoja   MR#:  [id number]   :  1985   Account #:  [de-identified]        Date of Adm:  10/27/2017       PRIMARY CARE PROVIDER: Morgan Alfonso NP.     PRESENTING COMPLAINT: Cough and weakness. HISTORY OF PRESENTING COMPLAINT: The patient is a 30-year-  old female with history of ESRD on peritoneal dialysis who presented   to the emergency room with complaints of cough. Her cough is   occasionally productive of whitish phlegm and there is associated   generalized weakness, body aches, fatigue, loss of appetite. The   patient also has reported nausea and vomiting. The patient vomited   once today. Vomitus consisted of recently ingested food. The patient was   diagnosed with pneumonia on 10/14/17. She was admitted to the hospital for 3   days and discharged on the  with oral Levaquin. The patient   completed 5 days of Levaquin. Currently patient she has continued to   cough and subsequently developed generalized weakness. There is   associated shortness of breath, but patient denied fever, chills, or   rigors. There is no chest pain, but patient reports heartburn. There are   no palpitations, irregular heart beat, anxiety or depression. The patient   has no complaints of nasal congestion, sore throat, epistaxis, or   rhinorrhea. She reported loss of appetite, but denied dysphagia or   odynophagia. She also reported nausea and vomiting, but denied   abdominal pain, constipation, or diarrhea. There is no dysuria,   frequency, hematuria, urethral discharge or vaginal discharge. She has no back pain or flank pain. The patient denied any   report of skin lesions or rash. There is no headache, lightheadedness,   dizziness, blurry vision, double vision, syncopal episode or seizures. The patient has no neck pain, neck stiffness, or confusion.     On presentation to the emergency room patient was found to have   persistence of her left lower lobe infiltrative. There was also a small left   pleural effusion. The patient was noted to be hypokalemic, and she   was given oral potassium and started on vancomycin and Zosyn after   blood cultures were obtained. PAST MEDICAL HISTORY:   1. 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 2. Asthma. 3. Degenerative disk disease. 4. ESRD, on peritoneal dialysis. 5. Heart failure. 6. Hypercholesterolemia. 7. Hypertension. 8. Systemic lupus erythematosus. 9. Pulmonary thromboembolism. PAST SURGICAL HISTORY:   1.  section. 2. Peritoneal dialysis catheter placement. MEDICATIONS:   1. Allopurinol 100 mg daily. 2. Amlodipine 10 mg daily. 3. Eliquis 5 mg twice daily. 4. Auryxia 210 mg with meals, take 2 tablets with meals. 5. Calcitriol 0.25 mcg daily. 6. Clonidine 0.3 mg 2 times daily. 7. Cyanocobalamin 2500 mcg daily. 8. Ergocalciferol 50,000 units every 7 days. 9. Hydroxychloroquine 200 mg 2 times daily. 10. Losartan 50 mg daily. 11. Metoprolol 100 mg daily. 12. Zofran 8 mg every 8 hours as needed. 13. Oxycodone IR 5 mg 1 tablet in the morning with half tablet in p.m.   as needed. 14. Protonix 40 mg before breakfast.   15. Prednisone 9 mg daily with breakfast.   16. Senna 8.6 mg tablet daily as needed. ALLERGIES:   1. COMPAZINE. 2. FISH. SOCIAL HISTORY: The patient is single. She denies tobacco, alcohol   or recreational drug use. FAMILY HISTORY: Significant for diabetes and hypertension in the   father and diabetes in the mother. REVIEW OF SYSTEMS: More than 12 systems reviewed, and the   pertinent positives are in the History of Present Illness. All others are   negative. PHYSICAL EXAMINATION:   GENERAL: The patient is seen lying in bed in no acute respiratory   distress. VITAL SIGNS: Blood pressure 123/87, pulse 141 on entry into the   emergency room.  When I was seeing patient pulse was 118,   respiration 18, temperature 98.5, pulse oximetry 98%. HEAD, EARS, NOSE, AND THROAT: Atraumatic, normocephalic. Anicteric, not pale, not jaundiced. Extraocular muscles intact. Pupils   bilaterally reactive, equal, round. NECK: Supple, no JVD, no carotid bruit. HEART SOUNDS: 1 and 2, tachycardic. No gallop, no friction, no rub. RESPIRATION: No wheezing, no rhonchi, no rales, no transmitted   sounds. ABDOMEN: Soft and no tenderness to palpation. Bowel sounds present. NEURO: Alert, oriented x3. Cranial nerves 2-12 intact. SKIN: Warm, dry, normal skin color, normal skin turgor. PSYCH: Normal mood, normal affect. BACK: No CVA tenderness. EXTREMITIES: No edema, no cyanosis. Normal range of motion. DIAGNOSTIC TESTS: EKG showed sinus tachycardia at 119 beats   per minute. WBC 9.4. On discharge WBC was 3.2, hemoglobin 11.2, hematocrit   34.2, platelet 088. Sodium 134, potassium 2.4, chloride 94, carbon   dioxide 30, glucose 97, BUN 24, creatinine 9.91, calcium 9.1. ALT 15,   AST 19, alkaline phosphatase 64. Lactic acid 2.8. Chest x-ray showed a little change, small left pleural effusion and left   lower lobe infiltrate. ASSESSMENT:   1. Persistent cough, possible healthcare-associated pneumonia. 2. Hypokalemia. 3. Dehydration. 4. End-stage renal disease, on peritoneal dialysis. 5. Hypertension. 6. Hyperlipidemia. 7. Anemia. 8. Systemic lupus erythematosus. 9. Heart failure. 10. Pulmonary embolism. PLAN: Admit patient to intermediate care unit on the hospitalist   service. Blood cultures, sputum culture. The patient was started on   Zosyn and vancomycin in the ED. We will continue Zosyn and vancomycin with   pharmacy consult. Replace serum potassium. The patient was given   oral potassium in the emergency room. We will also add intravenous   potassium. Monitor electrolytes, including potassium, with   replacement, IV fluids, a nephrology consult for dialysis continuation. Patient education was done by bedside. Fall precautions, skin care   precaution. Out of bed to chair with assistance. DVT prophylaxis: The   patient is on Eliquis. GI prophylaxis: Patient is on   Protonix. Monitor vital signs, including blood pressure as per unit   protocol. Restart home medications. I discussed advance directive with   patient and mother is the next of kin. CODE IS FULL CODE. Further   management will depend on patient's clinical progression, further   evaluation by attending physician, results of tests and recommendation   by Nephrology.          MD Landen Hanley / Merline Riser   D:  10/27/2017   20:52   T:  10/27/2017   22:13   Job #:  [id number]

## 2017-10-28 NOTE — PROGRESS NOTES
Bedside and Verbal shift change report given to Ann Valente (oncoming nurse) by Patricia Watkins (offgoing nurse). Report included the following information SBAR, Kardex, Intake/Output, MAR, Recent Results and Cardiac Rhythm NSR.

## 2017-10-28 NOTE — ED NOTES
TRANSFER - OUT REPORT:    Verbal report given to Farhan Edwards RN(name) on Angella Jimenez  being transferred to [facility](unit) for routine progression of care       Report consisted of patients Situation, Background, Assessment and   Recommendations(SBAR). Information from the following report(s) SBAR, ED Summary, Procedure Summary, Recent Results and Cardiac Rhythm sinus tach was reviewed with the receiving nurse. Lines:   Peripheral IV 10/27/17 Right Antecubital (Active)   Site Assessment Clean, dry, & intact 10/27/2017  5:55 PM   Phlebitis Assessment 0 10/27/2017  5:55 PM   Infiltration Assessment 0 10/27/2017  5:55 PM   Dressing Status Clean, dry, & intact 10/27/2017  5:55 PM   Dressing Type Transparent 10/27/2017  5:55 PM   Hub Color/Line Status Blue;Flushed;Patent 10/27/2017  5:55 PM   Action Taken Blood drawn 10/27/2017  5:55 PM        Opportunity for questions and clarification was provided.       Patient transported with:   [employer]

## 2017-10-28 NOTE — PROGRESS NOTES
Hospitalist Progress Note  Yamileth Wyman MD  Answering service: [phone] OR [phone] from in house phone  Cell: [phone]      Date of Service:  10/28/2017  NAME:  Jesica Wagoner  :  1985  MRN:  [id number]      Admission Summary:     She is a 33year old  [place] female with past medical history of ESRD on peritoneal   Dialysis who presented to the ED with the complaint of cough, generalized body weakness and malaise. cxr done showed small left sided pleural effusion and lower lobe infiltrate. Interval history / Subjective:       F/u for possible hospital acquired PNA  No acute overnight event. She said she feels slightly better. She reports that since she has been discharged she has not felt better. Still complaining of cough productive of intermittent whitish sputum. She reports generalized body pains. She also report multiple episode of vomiting. No undue abdominal pain. Assessment & Plan:     Persistent cough; secondary to presumed health care associated PNA vs other  cxr shows persistent left lower lobe infiltrate. She was recently discharged on the 10/17 for community acquired pna on levaquin. It takes 6 weeks for infiltrate to improve after a pna  Currently, no fever or chills. No elevated wbc. Continue vancomycin , zosyn and levaquin for now  Obtain sputum for gram stain and culture. Can discontinue vancomycin if no evidence of staph spp. Blood culture no growth after 10 hrs. Hypokalemia  Hypocalcemia  hypomagnesemia  Replete with kcl. Corrected calcium is 7.8 so will give calcium gluconate. Start empiric magnesium oxide. We will continue to monitor electrolytes and replete     H/o lupus; continue plaquenil and prednisone    H/o Pulmonary embolism; she is on eliquis  Oxygen sat is stable. ESRD on peritoneal dialysis; Consult nephrology for PD management.     Anemia of kidney disease; needs to be on Epogen. Monitor h/h closely    Code status: full  DVT prophylaxis: Eliquis    Care Plan discussed with: Patient/Family and Nurse  Disposition: Home w/Family and TBD     Hospital Problems  Date Reviewed: 10/27/2017          Codes Class Noted POA    Hypokalemia ICD-10-CM: E87.6  ICD-9-CM: 276.8  10/27/2017 Unknown                Review of Systems:   Pertinent items are noted in HPI. Vital Signs:    Last 24hrs VS reviewed since prior progress note. Most recent are:  Visit Vitals    /67 (BP 1 Location: Left arm, BP Patient Position: At rest)    Pulse 93    Temp 98 °F (36.7 °C)    Resp 15    Ht 5' 5\" (1.651 m)    Wt 70.3 kg (155 lb)    SpO2 100%    BMI 25.79 kg/m2       No intake or output data in the 24 hours ending 10/28/17 0854     Physical Examination:             Constitutional:  No acute distress, cooperative, pleasant, chronically ill looking. ENT:  Oral mucous moist, oropharynx benign. Neck supple,    Resp:  mild left lower lobe crackles. No wheezing/rhonchi/rales. No accessory muscle use   CV:  Regular rhythm, normal rate, no murmurs, gallops, rubs    GI:  Soft, non distended, non tender. normoactive bowel sounds, no hepatosplenomegaly. +ve PD catheter- clean and dry. No discharge     Musculoskeletal:  No edema, warm, 2+ pulses throughout    Neurologic:  Moves all extremities. AAOx3, CN II-XII reviewed            Data Review:          Labs:     Recent Labs      10/28/17   0348  10/27/17   1751   WBC  7.3  9.4   HGB  8.3*  11.2*   HCT  25.6*  34.2*   PLT  233  322     Recent Labs      10/28/17   0348  10/27/17   1751   NA  138  134*   K  3.1*  2.4*   CL  103  94*   CO2  27  30   BUN  23*  24*   CREA  9.12*  9.91*   GLU  65  97   CA  6.2*  9.1   MG  1.2*   --      Recent Labs      10/27/17   1751   SGOT  19   ALT  15   AP  64   TBILI  0.3   TP  6.6   ALB  2.0*   GLOB  4.6*     No results for input(s): INR, PTP, APTT in the last 72 hours.     No lab exists for component: INREXT   No results for input(s): FE, TIBC, PSAT, FERR in the last 72 hours. Lab Results   Component Value Date/Time    Folate 6.4 12/11/2015 06:38 PM      No results for input(s): PH, PCO2, PO2 in the last 72 hours. No results for input(s): CPK, CKNDX, TROIQ in the last 72 hours. No lab exists for component: CPKMB  Lab Results   Component Value Date/Time    Cholesterol, total 117 09/25/2015 02:02 PM    HDL Cholesterol 31 09/25/2015 02:02 PM    LDL, calculated 57 09/25/2015 02:02 PM    Triglyceride 146 09/25/2015 02:02 PM    CHOL/HDL Ratio 7.7 05/31/2009 10:30 AM     Lab Results   Component Value Date/Time    Glucose (POC) 95 09/16/2015 12:08 PM    Glucose (POC) 116 09/03/2013 06:20 AM    Glucose (POC) 151 09/02/2013 09:07 PM    Glucose (POC) 162 09/02/2013 04:57 PM    Glucose (POC) 133 09/02/2013 11:58 AM    Glucose (POC) 203 09/01/2013 11:02 PM     Lab Results   Component Value Date/Time    Color YELLOW/STRAW 08/12/2016 09:06 PM    Appearance CLEAR 08/12/2016 09:06 PM    Specific gravity 1.017 08/12/2016 09:06 PM    Specific gravity 1.010 07/11/2016 12:44 PM    pH (UA) 7.0 08/12/2016 09:06 PM    Protein 300 08/12/2016 09:06 PM    Glucose NEGATIVE  08/12/2016 09:06 PM    Ketone TRACE 08/12/2016 09:06 PM    Bilirubin NEGATIVE  07/11/2016 12:44 PM    Urobilinogen 1.0 08/12/2016 09:06 PM    Nitrites NEGATIVE  08/12/2016 09:06 PM    Leukocyte Esterase NEGATIVE  08/12/2016 09:06 PM    Epithelial cells MODERATE 08/12/2016 09:06 PM    Bacteria 1+ 08/12/2016 09:06 PM    WBC 0-4 08/12/2016 09:06 PM    RBC 0-5 08/12/2016 09:06 PM         Medications Reviewed:     Current Facility-Administered Medications   Medication Dose Route Frequency    allopurinol (ZYLOPRIM) tablet 100 mg  100 mg Oral DAILY    amLODIPine (NORVASC) tablet 10 mg  10 mg Oral DAILY    apixaban (ELIQUIS) tablet 10 mg  10 mg Oral BID    . PHARMACY TO SUBSTITUTE PER PROTOCOL    Per Protocol    calcitRIOL (ROCALTROL) capsule 0.25 mcg  0.25 mcg Oral DAILY    cyanocobalamin (VITAMIN B12) tablet 2,500 mcg  2,500 mcg Oral DAILY    [START ON 11/2/2017] ergocalciferol (ERGOCALCIFEROL) capsule 50,000 Units  50,000 Units Oral Q7D    hydroxychloroquine (PLAQUENIL) tablet 200 mg  200 mg Oral BID    lactobac ac& pc-s.therm-b.anim (AJIT Q/RISAQUAD)  1 Cap Oral DAILY    metoprolol succinate (TOPROL-XL) XL tablet 100 mg  100 mg Oral DAILY    pantoprazole (PROTONIX) tablet 40 mg  40 mg Oral ACB    predniSONE (DELTASONE) tablet 9 mg  9 mg Oral DAILY WITH BREAKFAST    senna (SENOKOT) tablet 8.6 mg  1 Tab Oral DAILY PRN    sodium chloride (NS) flush 5-10 mL  5-10 mL IntraVENous Q8H    sodium chloride (NS) flush 5-10 mL  5-10 mL IntraVENous PRN    albuterol (PROVENTIL VENTOLIN) nebulizer solution 2.5 mg  2.5 mg Nebulization Q4H PRN    0.9% sodium chloride infusion  75 mL/hr IntraVENous CONTINUOUS    ondansetron (ZOFRAN) injection 4 mg  4 mg IntraVENous Q6H PRN    [START ON 10/29/2017] levoFLOXacin (LEVAQUIN) 500 mg in D5W IVPB  500 mg IntraVENous Q48H    piperacillin-tazobactam (ZOSYN) 4.5 g in 0.9% sodium chloride (MBP/ADV) 100 mL  4.5 g IntraVENous ONCE    piperacillin-tazobactam (ZOSYN) 4.5 g in 0.9% sodium chloride (MBP/ADV) 100 mL  4.5 g IntraVENous Q12H    potassium chloride 10 mEq in 100 ml IVPB  10 mEq IntraVENous Q1H    calcium gluconate 1 g in 0.9% sodium chloride 100 mL IVPB  1 g IntraVENous ONCE    magnesium oxide (MAG-OX) tablet 400 mg  400 mg Oral BID    cloNIDine HCl (CATAPRES) tablet 0.3 mg  0.3 mg Oral BID    oxyCODONE IR (ROXICODONE) tablet 5 mg  5 mg Oral BID PRN    Vancomycin Pharmacy to Dose   Other Rx Dosing/Monitoring     ______________________________________________________________________  EXPECTED LENGTH OF STAY: - - -  ACTUAL LENGTH OF STAY:          1                 Jailene Ojeda MD

## 2017-10-28 NOTE — PROGRESS NOTES
Pharmacist Note - Vancomycin Dosing    Consult provided for this 33 y.o. female for indication of HCAP. Antibiotic regimen(s): zosyn    Recent Labs      10/27/17   1751   WBC  9.4   CREA  9.91*   BUN  24*     Frequency of BMP: daily x 3 then every other day  Height: 165 cm  Weight: 70 kg  Est CrCl: 8 ml/min - Peritoneal dialysis patient  Temp (24hrs), Av.6 °F (37 °C), Min:98.5 °F (36.9 °C), Max:98.6 °F (37 °C)    Cultures: pending    Goal trough = 15 - 20 mcg/mL    Therapy will be initiated with a loading dose of 1750 mg IV x 1. Pharmacy to follow patient daily and order levels / make dose adjustments as appropriate.

## 2017-10-28 NOTE — PROGRESS NOTES
Problem: Falls - Risk of  Goal: *Absence of Falls  Document Alex Fall Risk and appropriate interventions in the flowsheet. Outcome: Progressing Towards Goal  Fall Risk Interventions:            Medication Interventions: Assess postural VS orthostatic hypotension, Evaluate medications/consider consulting pharmacy, Teach patient to arise slowly                  Comments: Pt educated on orthostatic hypotension and to call out if she is feeling weak or dizzy. Pt will remain free from falls.   Johnathan Kelly

## 2017-10-28 NOTE — CONSULTS
Consultation Note    NAME: Sourav Belle   :  1985   MRN:  [id number]     Date/Time:  10/28/2017 12:51 PM    I have been asked to see this patient by Dr. Luis Sutton  for advice/opinion re: ESRD. Assessment :    Plan:  ESRD-Dr. Kaylee Feliz-[facility]  Anemia  Hypomagnesemia  Hypokalemia  Hypocalcemia  pneumonia Start CAPD with 1.5% bags 5X/day    Start EPO. Replete K/Mg/calcium       Subjective:   CHIEF COMPLAINT:  \"I have pneumonia again. \"    HISTORY OF PRESENT ILLNESS:     Karissa Purdy is a 33 y.o.   female who has a history of ESRD admitted with pneumonia/. She says that she was here 2 weeks ago with pneumonia. She says that she felt better on D/C but then at home developed worsening dyspnea over the 2-3 days PTA.  + sputum. No F/C. No CP. She says that she feels a little better today.     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 ([address]     Peritoneal dialysis status ([address] 10/2015    Thromboembolus ([address] 2013    lungs      Past Surgical History:   Procedure Laterality Date    HX  SECTION  2006    HX OTHER SURGICAL  9/16/15    INSERTION PD CATH     Social History   Substance Use Topics    Smoking status: Never Smoker    Smokeless tobacco: Never Used    Alcohol use No      Family History   Problem Relation Age of Onset    Diabetes Father     Hypertension Father     Cancer Other      aunt with breast cancer    Diabetes Mother       Allergies   Allergen Reactions    Compazine [Prochlorperazine Edisylate] Nausea and Vomiting and Palpitations    Fish Containing Products Rash     An itchy rash appeared in about 1 hour      Prior to Admission medications    Medication Sig Start Date End Date Taking? Authorizing Provider   apixaban (ELIQUIS) 5 mg tablet Take 2 Tabs by mouth two (2) times a day. For 6 days and then 1 tab twice daily 10/17/17  Yes MD gurjit Atkinsonacidoph[facility] 460 mg (7.5-6- 1.5 bill. cell) cap cap Take 1 Cap by mouth Daily (before breakfast). 10/17/17  Yes Wicho Mar MD   oxyCODONE IR (ROXICODONE) 5 mg immediate release tablet Take 1 tab in AM and HALF to 1 tab in PM if needed on days where back pain is severe    10/13/17  Yes Neeta Minaya MD   amLODIPine (NORVASC) 10 mg tablet Take 1 Tab by mouth daily. 10/7/16  Yes Cherry Stallworth NP   losartan (COZAAR) 50 mg tablet Take 1 Tab by mouth daily. For blood pressure 10/7/16  Yes Cherry Stallwroth NP   cloNIDine HCl (CATAPRES) 0.3 mg tablet Take 1 Tab by mouth two (2) times a day. For blood pressure  Patient taking differently: Take 0.3 mg by mouth three (3) times daily. For blood pressure 10/7/16  Yes Cherry Stallworth NP   metoprolol succinate (TOPROL-XL) 100 mg tablet Take 1 Tab by mouth daily. For blood pressure 10/7/16  Yes Cherry Stallworth NP   AURYXIA 210 mg iron tablet TK 2 TS PO WITH MEALS 7/20/16  Yes Historical Provider   predniSONE (DELTASONE) 10 mg tablet Take 9 mg by mouth daily (with breakfast). Yes Historical Provider   ergocalciferol (VITAMIN D2) 50,000 unit capsule Take 50,000 Units by mouth every seven (7) days. Yes Darnell Franks MD   hydroxychloroquine (PLAQUENIL) 200 mg tablet Take 200 mg by mouth two (2) times a day. Yes Darnell Franks MD   ondansetron hcl (ZOFRAN) 8 mg tablet Take 1 Tab by mouth every eight (8) hours as needed. For Nausea  Indications: GASTROPARESIS 7/14/16  Yes Parth Pires MD   allopurinol (ZYLOPRIM) 100 mg tablet Take 100 mg by mouth daily.  10/15/15 Yes Historical Provider   cyanocobalamin (VITAMIN B-12) 2,500 mcg sublingual tablet Take 1 Tab by mouth daily. 10/27/15  Yes Panchito Lawson MD   pantoprazole (PROTONIX) 40 mg tablet Take 1 Tab by mouth Daily (before breakfast). 10/23/15  Yes Sammi Irving MD   calcitRIOL (ROCALTROL) 0.25 mcg capsule Take 0.25 mcg by mouth daily. 8/6/15  Yes Historical Provider   senna (SENNA) 8.6 mg tablet Take 1 Tab by mouth daily as needed.    Yes Historical Provider     REVIEW OF SYSTEMS:     []  Unable to obtain reliable ROS due to  [] mental status  [] sedated   [] intubated   [] Total of 12 systems reviewed as follows:  Constitutional: negative fever, negative chills, negative weight loss  Eyes:   negative visual changes  ENT:   negative sore throat, tongue or lip swelling  Respiratory:  pos cough, pos dyspnea  Cards:  negative for chest pain, palpitations, lower extremity edema  GI:   negative for nausea, vomiting, diarrhea, and abdominal pain  :  negative for frequency, dysuria  Integument:  negative for rash and pruritus  Heme:  negative for easy bruising and gum/nose bleeding  Musculoskel: negative for myalgias,  back pain and muscle weakness  Neuro:  negative for headaches, dizziness, vertigo  Psych:  negative for feelings of anxiety, depression   Travel?: none    Objective:   VITALS:    Visit Vitals    BP 97/57 (BP 1 Location: Left arm, BP Patient Position: At rest;Sitting)    Pulse 78    Temp 98.1 °F (36.7 °C)    Resp 16    Ht 5' 5\" (1.651 m)    Wt 70.3 kg (155 lb)    SpO2 100%    BMI 25.79 kg/m2     PHYSICAL EXAM:  Gen:  []  WD []  WN  [] cachectic []  thin []  obese []  disheveled             []  ill apearing  []   Critical  []   Chronic    [x]  No acute distress    HEENT:   [x] NC/AT/PERRL    [x] pink conjunctivae      [] pale conjunctivae                  PERRL  [] yes  [] no      [] moist mucosa    [] dry mucosa    hearing intact to voice [] yes  [] No                 NECK:   supple [x] yes  [] no masses [] yes  [] No               thyroid  []  non tender  []  tender    RESP:   [] CTA bilaterally/no wheezing/rhonchi/rales/crackles    [x] rhonchi bilaterally - no dullness  [] wheezing   [] rhonchi   [] crackles     use of accessory muscles [] yes [] no    CARD:   [x]  regular rate and rhythm/No murmurs/rubs/gallops    murmur  [] yes ()  [] no      Rubs  [] yes  [] no       Gallops [] yes  [] no    Rate []  regular  []  irregular        carotid bruits  [] Right  []  Left                 LE edema [] yes  [x] no           JVP  []  yes   []  no    ABD:    [x] soft/non distended/non tender/+bowel sounds/no HSM/PD cath in place.   No exudate or erythema    []  Rigid    tenderness [] yes [] no   Liver enlargement  []  yes []  no                Spleen enlargement  []  yes []  no     distended []  yes [] no     bowel sound  [] hypoactive   [] hyperactive    LYMPH:    Neck []  yes [x]  no       Axillae []  yes [x]  no    SKIN:   Rashes []  yes   [x]  no    Ulcers []  yes   [x]  no               [] tight to palpitation    skin turgor []  good  [] poor  [] decreased               Cyanosis/clubbing []  yes []  no    NEUR:   [x] cranial nerves II-XII grossly intact       [] Cranial nerves deficit                 []  facial droop    []  slurred speech   [] aphasic     [] Strength normal     []  weakness  []  LUE  []   RUE/ []  LLE  []   RLE    follows commands  [x]  yes []  no           PSYCH:   insight [] poor [x] good   Alert and Oriented to  [x] person  [x] place  [x]  time                    [] depressed [] anxious [] agitated  [] lethargic [] stuporous  [] sedated     LAB DATA REVIEWED:    Recent Labs      10/28/17   0348  10/27/17   1751   WBC  7.3  9.4   HGB  8.3*  11.2*   HCT  25.6*  34.2*   PLT  233  322     Recent Labs      10/28/17   0348  10/27/17   1751   NA  138  134*   K  3.1*  2.4*   CL  103  94*   CO2  27  30   BUN  23*  24*   CREA  9.12*  9.91*   GLU  65  97   CA  6.2*  9.1   MG  1.2*   --      Recent Labs      10/27/17   1751   SGOT  19   ALT  15   AP  64   TBILI  0.3   ALB  2.0*   GLOB  4.6*     No results for input(s): INR, PTP, APTT in the last 72 hours. No lab exists for component: INREXT   No results for input(s): FE, TIBC, PSAT, FERR in the last 72 hours. No results for input(s): PH, PCO2, PO2 in the last 72 hours. No results for input(s): CPK, CKMB in the last 72 hours.     No lab exists for component: TROPONINI  Lab Results   Component Value Date/Time    Glucose (POC) 95 09/16/2015 12:08 PM    Glucose (POC) 116 09/03/2013 06:20 AM    Glucose (POC) 151 09/02/2013 09:07 PM    Glucose (POC) 162 09/02/2013 04:57 PM    Glucose (POC) 133 09/02/2013 11:58 AM    Glucose (POC) 203 09/01/2013 11:02 PM       Procedures: see electronic medical records for all procedures/Xrays and details which were not copied into this note but were reviewed prior to creation of Plan.    ________________________________________________________________________       ___________________________________________________  Consulting Physician: Omayra Reyes MD

## 2017-10-28 NOTE — PROGRESS NOTES
Problem: Patient Education: Go to Patient Education Activity  Goal: Patient/Family Education  Outcome: Progressing Towards Goal  Pt educated about how to track sodium levels, frequency of weigh ins and parameters to call provider. Pt encouraged to keep a list of medications with doses and frequency with her. Royal Paiz    Comments: Pt educated about how to track sodium levels, frequency of weigh ins and parameters to call provider. Pt encouraged to keep a list of medications with doses and frequency with her.   Royal Paiz

## 2017-10-28 NOTE — PROGRESS NOTES
Bedside, Verbal and Written shift change report given to Srinivas Blevins (oncoming nurse) by Adriana Gonzalez (offgoing nurse). Report included the following information SBAR, Kardex, Intake/Output, MAR, Accordion, Recent Results and Alarm Parameters    Informed nurse of IV potassium, critical lab value and pain management concerns  Adriana Gonzalez.

## 2017-10-29 LAB
ANION GAP SERPL CALC-SCNC: 10 MMOL/L (ref 5–15)
BUN SERPL-MCNC: 28 MG/DL (ref 6–20)
BUN/CREAT SERPL: 3 (ref 12–20)
CALCIUM SERPL-MCNC: 7.5 MG/DL (ref 8.5–10.1)
CHLORIDE SERPL-SCNC: 96 MMOL/L (ref 97–108)
CO2 SERPL-SCNC: 27 MMOL/L (ref 21–32)
CREAT SERPL-MCNC: 10.5 MG/DL (ref 0.55–1.02)
ERYTHROCYTE [DISTWIDTH] IN BLOOD BY AUTOMATED COUNT: 15.2 % (ref 11.5–14.5)
GLUCOSE SERPL-MCNC: 81 MG/DL (ref 65–100)
HCT VFR BLD AUTO: 26.6 % (ref 35–47)
HGB BLD-MCNC: 8.9 G/DL (ref 11.5–16)
MAGNESIUM SERPL-MCNC: 1.7 MG/DL (ref 1.6–2.4)
MCH RBC QN AUTO: 30.3 PG (ref 26–34)
MCHC RBC AUTO-ENTMCNC: 33.5 G/DL (ref 30–36.5)
MCV RBC AUTO: 90.5 FL (ref 80–99)
PHOSPHATE SERPL-MCNC: 2 MG/DL (ref 2.6–4.7)
PLATELET # BLD AUTO: 254 K/UL (ref 150–400)
POTASSIUM SERPL-SCNC: 3.1 MMOL/L (ref 3.5–5.1)
RBC # BLD AUTO: 2.94 M/UL (ref 3.8–5.2)
SODIUM SERPL-SCNC: 133 MMOL/L (ref 136–145)
WBC # BLD AUTO: 9.5 K/UL (ref 3.6–11)

## 2017-10-29 PROCEDURE — 74011000250 HC RX REV CODE- 250: Performed by: INTERNAL MEDICINE

## 2017-10-29 PROCEDURE — 80048 BASIC METABOLIC PNL TOTAL CA: CPT | Performed by: INTERNAL MEDICINE

## 2017-10-29 PROCEDURE — 36415 COLL VENOUS BLD VENIPUNCTURE: CPT | Performed by: INTERNAL MEDICINE

## 2017-10-29 PROCEDURE — 85027 COMPLETE CBC AUTOMATED: CPT | Performed by: INTERNAL MEDICINE

## 2017-10-29 PROCEDURE — 65660000001 HC RM ICU INTERMED STEPDOWN

## 2017-10-29 PROCEDURE — 74011000258 HC RX REV CODE- 258: Performed by: INTERNAL MEDICINE

## 2017-10-29 PROCEDURE — 74011250637 HC RX REV CODE- 250/637: Performed by: INTERNAL MEDICINE

## 2017-10-29 PROCEDURE — 74011250636 HC RX REV CODE- 250/636: Performed by: INTERNAL MEDICINE

## 2017-10-29 PROCEDURE — 83735 ASSAY OF MAGNESIUM: CPT | Performed by: INTERNAL MEDICINE

## 2017-10-29 PROCEDURE — 84100 ASSAY OF PHOSPHORUS: CPT | Performed by: INTERNAL MEDICINE

## 2017-10-29 PROCEDURE — 74011636637 HC RX REV CODE- 636/637: Performed by: INTERNAL MEDICINE

## 2017-10-29 PROCEDURE — A4722 DIALYS SOL FLD VOL > 1999CC: HCPCS | Performed by: INTERNAL MEDICINE

## 2017-10-29 PROCEDURE — 74011250637 HC RX REV CODE- 250/637: Performed by: HOSPITALIST

## 2017-10-29 RX ADMIN — Medication 10 ML: at 22:00

## 2017-10-29 RX ADMIN — Medication 1 CAPSULE: at 08:58

## 2017-10-29 RX ADMIN — Medication 2500 MCG: at 08:57

## 2017-10-29 RX ADMIN — ALLOPURINOL 100 MG: 100 TABLET ORAL at 08:57

## 2017-10-29 RX ADMIN — SODIUM CHLORIDE, SODIUM LACTATE, CALCIUM CHLORIDE, MAGNESIUM CHLORIDE AND DEXTROSE 2000 ML: 1.5; 538; 448; 18.3; 5.08 INJECTION, SOLUTION INTRAPERITONEAL at 10:35

## 2017-10-29 RX ADMIN — SODIUM CHLORIDE, SODIUM LACTATE, CALCIUM CHLORIDE, MAGNESIUM CHLORIDE AND DEXTROSE 2000 ML: 1.5; 538; 448; 18.3; 5.08 INJECTION, SOLUTION INTRAPERITONEAL at 06:36

## 2017-10-29 RX ADMIN — APIXABAN 5 MG: 5 TABLET, FILM COATED ORAL at 08:57

## 2017-10-29 RX ADMIN — Medication 10 ML: at 14:03

## 2017-10-29 RX ADMIN — PIPERACILLIN SODIUM,TAZOBACTAM SODIUM 4.5 G: 4; .5 INJECTION, POWDER, FOR SOLUTION INTRAVENOUS at 03:49

## 2017-10-29 RX ADMIN — CALCITRIOL 0.25 MCG: 0.25 CAPSULE, LIQUID FILLED ORAL at 08:58

## 2017-10-29 RX ADMIN — PIPERACILLIN SODIUM,TAZOBACTAM SODIUM 4.5 G: 4; .5 INJECTION, POWDER, FOR SOLUTION INTRAVENOUS at 16:24

## 2017-10-29 RX ADMIN — SODIUM CHLORIDE, SODIUM LACTATE, CALCIUM CHLORIDE, MAGNESIUM CHLORIDE AND DEXTROSE 2000 ML: 1.5; 538; 448; 18.3; 5.08 INJECTION, SOLUTION INTRAPERITONEAL at 15:23

## 2017-10-29 RX ADMIN — PREDNISONE 9 MG: 1 TABLET ORAL at 08:57

## 2017-10-29 RX ADMIN — OXYCODONE HYDROCHLORIDE 5 MG: 5 TABLET ORAL at 21:20

## 2017-10-29 RX ADMIN — PANTOPRAZOLE SODIUM 40 MG: 40 TABLET, DELAYED RELEASE ORAL at 06:35

## 2017-10-29 RX ADMIN — SODIUM CHLORIDE, SODIUM LACTATE, CALCIUM CHLORIDE, MAGNESIUM CHLORIDE AND DEXTROSE 2000 ML: 1.5; 538; 448; 18.3; 5.08 INJECTION, SOLUTION INTRAPERITONEAL at 21:27

## 2017-10-29 RX ADMIN — Medication 400 MG: at 18:39

## 2017-10-29 RX ADMIN — APIXABAN 5 MG: 5 TABLET, FILM COATED ORAL at 21:20

## 2017-10-29 RX ADMIN — HYDROXYCHLOROQUINE SULFATE 200 MG: 200 TABLET, FILM COATED ORAL at 18:39

## 2017-10-29 RX ADMIN — Medication 10 ML: at 06:00

## 2017-10-29 RX ADMIN — SODIUM CHLORIDE, SODIUM LACTATE, CALCIUM CHLORIDE, MAGNESIUM CHLORIDE AND DEXTROSE 2000 ML: 1.5; 538; 448; 18.3; 5.08 INJECTION, SOLUTION INTRAPERITONEAL at 18:39

## 2017-10-29 RX ADMIN — LEVOFLOXACIN 500 MG: 5 INJECTION, SOLUTION INTRAVENOUS at 21:20

## 2017-10-29 RX ADMIN — OXYCODONE HYDROCHLORIDE AND ACETAMINOPHEN 1 TABLET: 5; 325 TABLET ORAL at 14:03

## 2017-10-29 RX ADMIN — Medication 400 MG: at 08:56

## 2017-10-29 RX ADMIN — HYDROXYCHLOROQUINE SULFATE 200 MG: 200 TABLET, FILM COATED ORAL at 08:57

## 2017-10-29 RX ADMIN — SODIUM CHLORIDE: 900 INJECTION, SOLUTION INTRAVENOUS at 13:21

## 2017-10-29 NOTE — PROGRESS NOTES
Found Magnesium bag clamped and flush bag empty approx 1045pm. I hung a new flush and restarted the Magnesium bag. There was only one bag ordered and it was charted for 1410. Pt starts PO mag tomorrow.   Jose Enrique Yeung

## 2017-10-29 NOTE — PROGRESS NOTES
Bedside, Verbal and Written shift change report given to Padma (oncoming nurse) by Jeff Tamayo (offgoing nurse). Report included the following information SBAR, Kardex, Intake/Output, MAR, Recent Results, Med Rec Status and Alarm Parameters    Jeff Tamayo.

## 2017-10-29 NOTE — PROGRESS NOTES
Hospitalist Progress Note  Marisa Funk MD  Answering service: [phone] OR [phone] from in house phone  Cell: [phone]      Date of Service:  10/29/2017  NAME:  Kirill Blackwell  :  1985  MRN:  [id number]      Admission Summary:     She is a 33year old  [place] female with past medical history of ESRD on peritoneal   Dialysis who presented to the ED with the complaint of cough, generalized body weakness and malaise. cxr done showed small left sided pleural effusion and lower lobe infiltrate. Interval history / Subjective:       F/u for possible hospital acquired PNA  No acute overnight event. She feels slightly better today with improved cough. Mostly dry cough. No nausea/vomiting. Assessment & Plan:     Possible health care associated PNA vs other  cxr shows persistent left lower lobe infiltrate. Will repeat CXR PA/lateral.  She was recently discharged on the 10/17 for community acquired pna on levaquin. Continue vancomycin , zosyn and levaquin for now  No sputum culture done so far. Blood culture no growth. Hypokalemia  Hypocalcemia: received some calcium gluconate yesterday. Hypophosphatemia: getting Kphos  monitor electrolytes. H/o lupus; continue plaquenil and prednisone    H/o Pulmonary embolism; she is on eliquis  Oxygen sat is stable. ESRD on peritoneal dialysis; Consult nephrology for PD management. Anemia of kidney disease;  on Epogen. Monitor h/h closely    Code status: full  DVT prophylaxis: Eliquis    Care Plan discussed with: Patient/Family and Nurse  Disposition: Home w/Family and TBD     Hospital Problems  Date Reviewed: 10/27/2017          Codes Class Noted POA    Hypokalemia ICD-10-CM: E87.6  ICD-9-CM: 276.8  10/27/2017 Unknown                Review of Systems:   Pertinent items are noted in HPI. Vital Signs:    Last 24hrs VS reviewed since prior progress note.  Most recent are:  Visit Vitals    /65 (BP 1 Location: Right arm, BP Patient Position: At rest)    Pulse 69    Temp 98 °F (36.7 °C)    Resp 19    Ht 5' 5\" (1.651 m)    Wt 70.4 kg (155 lb 3.2 oz)    SpO2 100%    BMI 25.83 kg/m2         Intake/Output Summary (Last 24 hours) at 10/29/17 1447  Last data filed at 10/29/17 0752   Gross per 24 hour   Intake                0 ml   Output             1750 ml   Net            -1750 ml        Physical Examination:             Constitutional:  No acute distress, cooperative, pleasant, chronically ill looking. ENT:  Oral mucous moist, oropharynx benign. Neck supple,    Resp:  No wheezing/rhonchi/rales. No accessory muscle use   CV:  Regular rhythm, normal rate, no murmurs, gallops, rubs    GI:  Soft, non distended, non tender. normoactive bowel sounds, no hepatosplenomegaly. +ve PD catheter- clean and dry. No discharge     Musculoskeletal:  No edema, warm, 2+ pulses throughout    Neurologic:  Moves all extremities. AAOx3, CN II-XII reviewed            Data Review:          Labs:     Recent Labs      10/29/17   0351  10/28/17   0348   WBC  9.5  7.3   HGB  8.9*  8.3*   HCT  26.6*  25.6*   PLT  254  233     Recent Labs      10/29/17   0351  10/28/17   0348  10/27/17   1751   NA  133*  138  134*   K  3.1*  3.1*  2.4*   CL  96*  103  94*   CO2  27  27  30   BUN  28*  23*  24*   CREA  10.50*  9.12*  9.91*   GLU  81  65  97   CA  7.5*  6.2*  9.1   MG  1.7  1.2*   --    PHOS  2.0*   --    --      Recent Labs      10/27/17   1751   SGOT  19   ALT  15   AP  64   TBILI  0.3   TP  6.6   ALB  2.0*   GLOB  4.6*     No results for input(s): INR, PTP, APTT in the last 72 hours. No lab exists for component: INREXT, INREXT   No results for input(s): FE, TIBC, PSAT, FERR in the last 72 hours. Lab Results   Component Value Date/Time    Folate 6.4 12/11/2015 06:38 PM      No results for input(s): PH, PCO2, PO2 in the last 72 hours.   No results for input(s): CPK, CKNDX, TROIQ in the last 72 hours.    No lab exists for component: CPKMB  Lab Results   Component Value Date/Time    Cholesterol, total 117 09/25/2015 02:02 PM    HDL Cholesterol 31 09/25/2015 02:02 PM    LDL, calculated 57 09/25/2015 02:02 PM    Triglyceride 146 09/25/2015 02:02 PM    CHOL/HDL Ratio 7.7 05/31/2009 10:30 AM     Lab Results   Component Value Date/Time    Glucose (POC) 95 09/16/2015 12:08 PM    Glucose (POC) 116 09/03/2013 06:20 AM    Glucose (POC) 151 09/02/2013 09:07 PM    Glucose (POC) 162 09/02/2013 04:57 PM    Glucose (POC) 133 09/02/2013 11:58 AM    Glucose (POC) 203 09/01/2013 11:02 PM     Lab Results   Component Value Date/Time    Color YELLOW/STRAW 08/12/2016 09:06 PM    Appearance CLEAR 08/12/2016 09:06 PM    Specific gravity 1.017 08/12/2016 09:06 PM    Specific gravity 1.010 07/11/2016 12:44 PM    pH (UA) 7.0 08/12/2016 09:06 PM    Protein 300 08/12/2016 09:06 PM    Glucose NEGATIVE  08/12/2016 09:06 PM    Ketone TRACE 08/12/2016 09:06 PM    Bilirubin NEGATIVE  07/11/2016 12:44 PM    Urobilinogen 1.0 08/12/2016 09:06 PM    Nitrites NEGATIVE  08/12/2016 09:06 PM    Leukocyte Esterase NEGATIVE  08/12/2016 09:06 PM    Epithelial cells MODERATE 08/12/2016 09:06 PM    Bacteria 1+ 08/12/2016 09:06 PM    WBC 0-4 08/12/2016 09:06 PM    RBC 0-5 08/12/2016 09:06 PM         Medications Reviewed:     Current Facility-Administered Medications   Medication Dose Route Frequency    potassium phosphate 20 mmol in 0.9% sodium chloride 250 mL infusion   IntraVENous ONCE    allopurinol (ZYLOPRIM) tablet 100 mg  100 mg Oral DAILY    amLODIPine (NORVASC) tablet 10 mg  10 mg Oral DAILY    apixaban (ELIQUIS) tablet 5 mg  5 mg Oral BID    . PHARMACY TO SUBSTITUTE PER PROTOCOL    Per Protocol    calcitRIOL (ROCALTROL) capsule 0.25 mcg  0.25 mcg Oral DAILY    cyanocobalamin (VITAMIN B12) tablet 2,500 mcg  2,500 mcg Oral DAILY    hydroxychloroquine (PLAQUENIL) tablet 200 mg  200 mg Oral BID    lactobac ac& pc-s.therm-b.anim (AJIT Q/RISAQUAD)  1 Cap Oral DAILY    metoprolol succinate (TOPROL-XL) XL tablet 100 mg  100 mg Oral DAILY    pantoprazole (PROTONIX) tablet 40 mg  40 mg Oral ACB    predniSONE (DELTASONE) tablet 9 mg  9 mg Oral DAILY WITH BREAKFAST    senna (SENOKOT) tablet 8.6 mg  1 Tab Oral DAILY PRN    sodium chloride (NS) flush 5-10 mL  5-10 mL IntraVENous Q8H    sodium chloride (NS) flush 5-10 mL  5-10 mL IntraVENous PRN    albuterol (PROVENTIL VENTOLIN) nebulizer solution 2.5 mg  2.5 mg Nebulization Q4H PRN    ondansetron (ZOFRAN) injection 4 mg  4 mg IntraVENous Q6H PRN    levoFLOXacin (LEVAQUIN) 500 mg in D5W IVPB  500 mg IntraVENous Q48H    piperacillin-tazobactam (ZOSYN) 4.5 g in 0.9% sodium chloride (MBP/ADV) 100 mL  4.5 g IntraVENous Q12H    magnesium oxide (MAG-OX) tablet 400 mg  400 mg Oral BID    oxyCODONE-acetaminophen (PERCOCET) 5-325 mg per tablet 1 Tab  1 Tab Oral Q4H PRN    epoetin pia (EPOGEN;PROCRIT) injection 10,000 Units  10,000 Units SubCUTAneous Q7D    peritoneal dialysis DEXTROSE 1.5% (2.5 mEq/L low calcium) solution 2,000 mL  2,000 mL IntraPERitoneal 5XD    cloNIDine HCl (CATAPRES) tablet 0.3 mg  0.3 mg Oral BID    oxyCODONE IR (ROXICODONE) tablet 5 mg  5 mg Oral BID PRN    Vancomycin Pharmacy to Dose   Other Rx Dosing/Monitoring     ______________________________________________________________________  EXPECTED LENGTH OF STAY: - - -  ACTUAL LENGTH OF STAY:          2                 Neptali Chappell MD

## 2017-10-29 NOTE — PROGRESS NOTES
Problem: Patient Education: Go to Patient Education Activity  Goal: Patient/Family Education  Outcome: Progressing Towards Goal  Pt discussed lifestlye choices such as goal levels, and how to weigh herself, and how to check for edema and when to see a doctor. Comments: Pt discussed lifestlye choices such as goal levels, and how to weigh herself, and how to check for edema and when to see a doctor.

## 2017-10-29 NOTE — PROGRESS NOTES
NAME: Pratibha Hollis        :  1985        MRN:  [id number]        Assessment :    Plan:  --ESRD-Dr. Saundra Feliz-[facility]  Anemia  Hypomagnesemia  Hypokalemia  Hypocalcemia  pneumonia --Continue with current PD.    KPhos IV. Continue EPO       Subjective:     Chief Complaint:  \" I feel better. \"  Less dyspnea. No N/V. No HA. No abd. Pain. Review of Systems:    Symptom Y/N Comments  Symptom Y/N Comments   Fever/Chills    Chest Pain     Poor Appetite    Edema     Cough    Abdominal Pain     Sputum    Joint Pain     SOB/IVY    Pruritis/Rash     Nausea/vomit    Tolerating PT/OT     Diarrhea    Tolerating Diet     Constipation    Other       Could not obtain due to:      Objective:     VITALS:   Last 24hrs VS reviewed since prior progress note.  Most recent are:  Visit Vitals    /65 (BP 1 Location: Right arm, BP Patient Position: At rest)    Pulse 69    Temp 98 °F (36.7 °C)    Resp 19    Ht 5' 5\" (1.651 m)    Wt 70.4 kg (155 lb 3.2 oz)    SpO2 100%    BMI 25.83 kg/m2       Intake/Output Summary (Last 24 hours) at 10/29/17 1214  Last data filed at 10/29/17 6124   Gross per 24 hour   Intake              290 ml   Output             1750 ml   Net            -1460 ml      Telemetry Reviewed:     PHYSICAL EXAM:  General: NAD  Few rhonchi  abd soft  No edema      Lab Data Reviewed: (see below)    Medications Reviewed: (see below)    PMH/SH reviewed - no change compared to H&P  ________________________________________________________________________  Care Plan discussed with:  Patient     Family      RN     Care Manager                    Consultant:          Comments   >50% of visit spent in counseling and coordination of care       ________________________________________________________________________  Felix Webb MD     Procedures: see electronic medical records for all procedures/Xrays and details which  were not copied into this note but were reviewed prior to creation of Plan. LABS:  Recent Labs      10/29/17   0351  10/28/17   0348   WBC  9.5  7.3   HGB  8.9*  8.3*   HCT  26.6*  25.6*   PLT  254  233     Recent Labs      10/29/17   0351  10/28/17   0348  10/27/17   1751   NA  133*  138  134*   K  3.1*  3.1*  2.4*   CL  96*  103  94*   CO2  27  27  30   BUN  28*  23*  24*   CREA  10.50*  9.12*  9.91*   GLU  81  65  97   CA  7.5*  6.2*  9.1   MG  1.7  1.2*   --    PHOS  2.0*   --    --      Recent Labs      10/27/17   1751   SGOT  19   AP  64   TP  6.6   ALB  2.0*   GLOB  4.6*     No results for input(s): INR, PTP, APTT in the last 72 hours. No lab exists for component: INREXT   No results for input(s): FE, TIBC, PSAT, FERR in the last 72 hours. Lab Results   Component Value Date/Time    Folate 6.4 12/11/2015 06:38 PM      No results for input(s): PH, PCO2, PO2 in the last 72 hours. No results for input(s): CPK, CKMB in the last 72 hours.     No lab exists for component: TROPONINI  No components found for: [place]  Lab Results   Component Value Date/Time    Color YELLOW/STRAW 08/12/2016 09:06 PM    Appearance CLEAR 08/12/2016 09:06 PM    Specific gravity 1.017 08/12/2016 09:06 PM    Specific gravity 1.010 07/11/2016 12:44 PM    pH (UA) 7.0 08/12/2016 09:06 PM    Protein 300 08/12/2016 09:06 PM    Glucose NEGATIVE  08/12/2016 09:06 PM    Ketone TRACE 08/12/2016 09:06 PM    Bilirubin NEGATIVE  07/11/2016 12:44 PM    Urobilinogen 1.0 08/12/2016 09:06 PM    Nitrites NEGATIVE  08/12/2016 09:06 PM    Leukocyte Esterase NEGATIVE  08/12/2016 09:06 PM    Epithelial cells MODERATE 08/12/2016 09:06 PM    Bacteria 1+ 08/12/2016 09:06 PM    WBC 0-4 08/12/2016 09:06 PM    RBC 0-5 08/12/2016 09:06 PM       MEDICATIONS:  Current Facility-Administered Medications   Medication Dose Route Frequency    potassium phosphate 20 mmol in 0.9% sodium chloride 250 mL infusion   IntraVENous ONCE    allopurinol (ZYLOPRIM) tablet 100 mg  100 mg Oral DAILY    amLODIPine (NORVASC) tablet 10 mg  10 mg Oral DAILY    apixaban (ELIQUIS) tablet 5 mg  5 mg Oral BID    . PHARMACY TO SUBSTITUTE PER PROTOCOL    Per Protocol    calcitRIOL (ROCALTROL) capsule 0.25 mcg  0.25 mcg Oral DAILY    cyanocobalamin (VITAMIN B12) tablet 2,500 mcg  2,500 mcg Oral DAILY    hydroxychloroquine (PLAQUENIL) tablet 200 mg  200 mg Oral BID    lactobac ac& pc-s.therm-b.anim (AJIT Q/RISAQUAD)  1 Cap Oral DAILY    metoprolol succinate (TOPROL-XL) XL tablet 100 mg  100 mg Oral DAILY    pantoprazole (PROTONIX) tablet 40 mg  40 mg Oral ACB    predniSONE (DELTASONE) tablet 9 mg  9 mg Oral DAILY WITH BREAKFAST    senna (SENOKOT) tablet 8.6 mg  1 Tab Oral DAILY PRN    sodium chloride (NS) flush 5-10 mL  5-10 mL IntraVENous Q8H    sodium chloride (NS) flush 5-10 mL  5-10 mL IntraVENous PRN    albuterol (PROVENTIL VENTOLIN) nebulizer solution 2.5 mg  2.5 mg Nebulization Q4H PRN    ondansetron (ZOFRAN) injection 4 mg  4 mg IntraVENous Q6H PRN    levoFLOXacin (LEVAQUIN) 500 mg in D5W IVPB  500 mg IntraVENous Q48H    piperacillin-tazobactam (ZOSYN) 4.5 g in 0.9% sodium chloride (MBP/ADV) 100 mL  4.5 g IntraVENous Q12H    magnesium oxide (MAG-OX) tablet 400 mg  400 mg Oral BID    oxyCODONE-acetaminophen (PERCOCET) 5-325 mg per tablet 1 Tab  1 Tab Oral Q4H PRN    epoetin pia (EPOGEN;PROCRIT) injection 10,000 Units  10,000 Units SubCUTAneous Q7D    peritoneal dialysis DEXTROSE 1.5% (2.5 mEq/L low calcium) solution 2,000 mL  2,000 mL IntraPERitoneal 5XD    cloNIDine HCl (CATAPRES) tablet 0.3 mg  0.3 mg Oral BID    oxyCODONE IR (ROXICODONE) tablet 5 mg  5 mg Oral BID PRN    Vancomycin Pharmacy to Dose   Other Rx Dosing/Monitoring

## 2017-10-29 NOTE — PROGRESS NOTES
Problem: Falls - Risk of  Goal: *Absence of Falls  Document Alex Fall Risk and appropriate interventions in the flowsheet. Outcome: Progressing Towards Goal  Fall Risk Interventions:            Medication Interventions: Bed/chair exit alarm, Evaluate medications/consider consulting pharmacy, Patient to call before getting OOB, Teach patient to arise slowly                  Comments: Pt is independent but I reeducated about rising slowly and calling for help if she needs it. Pt understands orthstatic hypotension and how to prevent it.

## 2017-10-30 ENCOUNTER — APPOINTMENT (OUTPATIENT)
Dept: GENERAL RADIOLOGY | Age: 32
DRG: 193 | End: 2017-10-30
Attending: HOSPITALIST
Payer: MEDICARE

## 2017-10-30 VITALS
TEMPERATURE: 98.2 F | WEIGHT: 170.64 LBS | HEART RATE: 77 BPM | BODY MASS INDEX: 28.43 KG/M2 | OXYGEN SATURATION: 95 % | SYSTOLIC BLOOD PRESSURE: 120 MMHG | HEIGHT: 65 IN | RESPIRATION RATE: 18 BRPM | DIASTOLIC BLOOD PRESSURE: 80 MMHG

## 2017-10-30 PROBLEM — E83.51 HYPOCALCEMIA: Status: ACTIVE | Noted: 2017-10-30

## 2017-10-30 PROBLEM — E83.42 HYPOMAGNESEMIA: Status: ACTIVE | Noted: 2017-10-30

## 2017-10-30 LAB
ANION GAP SERPL CALC-SCNC: 9 MMOL/L (ref 5–15)
BASOPHILS # BLD: 0 K/UL (ref 0–0.1)
BASOPHILS NFR BLD: 0 % (ref 0–1)
BUN SERPL-MCNC: 26 MG/DL (ref 6–20)
BUN/CREAT SERPL: 3 (ref 12–20)
CALCIUM SERPL-MCNC: 7.1 MG/DL (ref 8.5–10.1)
CHLORIDE SERPL-SCNC: 97 MMOL/L (ref 97–108)
CO2 SERPL-SCNC: 27 MMOL/L (ref 21–32)
CREAT SERPL-MCNC: 9.68 MG/DL (ref 0.55–1.02)
EOSINOPHIL # BLD: 0.2 K/UL (ref 0–0.4)
EOSINOPHIL NFR BLD: 2 % (ref 0–7)
ERYTHROCYTE [DISTWIDTH] IN BLOOD BY AUTOMATED COUNT: 15.1 % (ref 11.5–14.5)
GLUCOSE SERPL-MCNC: 85 MG/DL (ref 65–100)
HCT VFR BLD AUTO: 26.8 % (ref 35–47)
HGB BLD-MCNC: 8.9 G/DL (ref 11.5–16)
LYMPHOCYTES # BLD: 1.9 K/UL (ref 0.8–3.5)
LYMPHOCYTES NFR BLD: 21 % (ref 12–49)
MAGNESIUM SERPL-MCNC: 1.9 MG/DL (ref 1.6–2.4)
MCH RBC QN AUTO: 29.7 PG (ref 26–34)
MCHC RBC AUTO-ENTMCNC: 33.2 G/DL (ref 30–36.5)
MCV RBC AUTO: 89.3 FL (ref 80–99)
MONOCYTES # BLD: 0.4 K/UL (ref 0–1)
MONOCYTES NFR BLD: 4 % (ref 5–13)
NEUTS SEG # BLD: 6.5 K/UL (ref 1.8–8)
NEUTS SEG NFR BLD: 73 % (ref 32–75)
PHOSPHATE SERPL-MCNC: 2.8 MG/DL (ref 2.6–4.7)
PLATELET # BLD AUTO: 260 K/UL (ref 150–400)
POTASSIUM SERPL-SCNC: 3.1 MMOL/L (ref 3.5–5.1)
RBC # BLD AUTO: 3 M/UL (ref 3.8–5.2)
SODIUM SERPL-SCNC: 133 MMOL/L (ref 136–145)
WBC # BLD AUTO: 9.1 K/UL (ref 3.6–11)

## 2017-10-30 PROCEDURE — 74011250637 HC RX REV CODE- 250/637: Performed by: HOSPITALIST

## 2017-10-30 PROCEDURE — 94640 AIRWAY INHALATION TREATMENT: CPT

## 2017-10-30 PROCEDURE — 74011636637 HC RX REV CODE- 636/637: Performed by: INTERNAL MEDICINE

## 2017-10-30 PROCEDURE — 84100 ASSAY OF PHOSPHORUS: CPT | Performed by: INTERNAL MEDICINE

## 2017-10-30 PROCEDURE — 74011000250 HC RX REV CODE- 250: Performed by: INTERNAL MEDICINE

## 2017-10-30 PROCEDURE — 74011000258 HC RX REV CODE- 258: Performed by: INTERNAL MEDICINE

## 2017-10-30 PROCEDURE — 74011250637 HC RX REV CODE- 250/637: Performed by: INTERNAL MEDICINE

## 2017-10-30 PROCEDURE — 80048 BASIC METABOLIC PNL TOTAL CA: CPT | Performed by: INTERNAL MEDICINE

## 2017-10-30 PROCEDURE — 74011250636 HC RX REV CODE- 250/636: Performed by: INTERNAL MEDICINE

## 2017-10-30 PROCEDURE — 36415 COLL VENOUS BLD VENIPUNCTURE: CPT | Performed by: INTERNAL MEDICINE

## 2017-10-30 PROCEDURE — A4722 DIALYS SOL FLD VOL > 1999CC: HCPCS | Performed by: INTERNAL MEDICINE

## 2017-10-30 PROCEDURE — 85025 COMPLETE CBC W/AUTO DIFF WBC: CPT | Performed by: HOSPITALIST

## 2017-10-30 PROCEDURE — 94761 N-INVAS EAR/PLS OXIMETRY MLT: CPT

## 2017-10-30 PROCEDURE — 83735 ASSAY OF MAGNESIUM: CPT | Performed by: INTERNAL MEDICINE

## 2017-10-30 RX ORDER — POTASSIUM CHLORIDE 20 MEQ/1
40 TABLET, EXTENDED RELEASE ORAL DAILY
Qty: 14 TAB | Refills: 0 | Status: SHIPPED | OUTPATIENT
Start: 2017-10-30 | End: 2017-11-06

## 2017-10-30 RX ORDER — LANOLIN ALCOHOL/MO/W.PET/CERES
400 CREAM (GRAM) TOPICAL 2 TIMES DAILY
Qty: 60 TAB | Refills: 0 | Status: SHIPPED | OUTPATIENT
Start: 2017-10-30 | End: 2017-11-29

## 2017-10-30 RX ORDER — POTASSIUM CHLORIDE 750 MG/1
40 TABLET, FILM COATED, EXTENDED RELEASE ORAL DAILY
Status: DISCONTINUED | OUTPATIENT
Start: 2017-10-30 | End: 2017-10-30 | Stop reason: HOSPADM

## 2017-10-30 RX ORDER — ALBUTEROL SULFATE 90 UG/1
1-2 AEROSOL, METERED RESPIRATORY (INHALATION)
Qty: 1 INHALER | Refills: 2 | Status: SHIPPED | OUTPATIENT
Start: 2017-10-30 | End: 2019-10-18

## 2017-10-30 RX ORDER — AMOXICILLIN AND CLAVULANATE POTASSIUM 875; 125 MG/1; MG/1
1 TABLET, FILM COATED ORAL EVERY 12 HOURS
Qty: 14 TAB | Refills: 0 | Status: SHIPPED | OUTPATIENT
Start: 2017-10-30 | End: 2017-11-28

## 2017-10-30 RX ADMIN — APIXABAN 5 MG: 5 TABLET, FILM COATED ORAL at 10:24

## 2017-10-30 RX ADMIN — OXYCODONE HYDROCHLORIDE 5 MG: 5 TABLET ORAL at 13:09

## 2017-10-30 RX ADMIN — Medication 400 MG: at 10:23

## 2017-10-30 RX ADMIN — POTASSIUM CHLORIDE 40 MEQ: 750 TABLET, FILM COATED, EXTENDED RELEASE ORAL at 10:24

## 2017-10-30 RX ADMIN — Medication 2500 MCG: at 10:24

## 2017-10-30 RX ADMIN — Medication 1 CAPSULE: at 10:32

## 2017-10-30 RX ADMIN — PIPERACILLIN SODIUM,TAZOBACTAM SODIUM 4.5 G: 4; .5 INJECTION, POWDER, FOR SOLUTION INTRAVENOUS at 05:01

## 2017-10-30 RX ADMIN — Medication 10 ML: at 06:38

## 2017-10-30 RX ADMIN — PREDNISONE 9 MG: 1 TABLET ORAL at 10:32

## 2017-10-30 RX ADMIN — Medication 10 ML: at 15:30

## 2017-10-30 RX ADMIN — AMLODIPINE BESYLATE 10 MG: 5 TABLET ORAL at 10:20

## 2017-10-30 RX ADMIN — SODIUM CHLORIDE, SODIUM LACTATE, CALCIUM CHLORIDE, MAGNESIUM CHLORIDE AND DEXTROSE 2000 ML: 1.5; 538; 448; 18.3; 5.08 INJECTION, SOLUTION INTRAPERITONEAL at 05:01

## 2017-10-30 RX ADMIN — PANTOPRAZOLE SODIUM 40 MG: 40 TABLET, DELAYED RELEASE ORAL at 06:38

## 2017-10-30 RX ADMIN — METOPROLOL SUCCINATE 100 MG: 50 TABLET, EXTENDED RELEASE ORAL at 10:24

## 2017-10-30 RX ADMIN — OXYCODONE HYDROCHLORIDE AND ACETAMINOPHEN 1 TABLET: 5; 325 TABLET ORAL at 10:24

## 2017-10-30 RX ADMIN — ALLOPURINOL 100 MG: 100 TABLET ORAL at 10:20

## 2017-10-30 RX ADMIN — ALBUTEROL SULFATE 2.5 MG: 2.5 SOLUTION RESPIRATORY (INHALATION) at 13:35

## 2017-10-30 RX ADMIN — SODIUM CHLORIDE, SODIUM LACTATE, CALCIUM CHLORIDE, MAGNESIUM CHLORIDE AND DEXTROSE 2000 ML: 1.5; 538; 448; 18.3; 5.08 INJECTION, SOLUTION INTRAPERITONEAL at 15:23

## 2017-10-30 RX ADMIN — CALCITRIOL 0.25 MCG: 0.25 CAPSULE, LIQUID FILLED ORAL at 10:19

## 2017-10-30 RX ADMIN — SODIUM CHLORIDE, SODIUM LACTATE, CALCIUM CHLORIDE, MAGNESIUM CHLORIDE AND DEXTROSE 2000 ML: 1.5; 538; 448; 18.3; 5.08 INJECTION, SOLUTION INTRAPERITONEAL at 12:21

## 2017-10-30 RX ADMIN — HYDROXYCHLOROQUINE SULFATE 200 MG: 200 TABLET, FILM COATED ORAL at 10:32

## 2017-10-30 NOTE — PROGRESS NOTES
Patient completed six minute walk; results below.        10/30/17 1500 10/30/17 1501 10/30/17 1503   RT Walking Oximetry   Stage Resting (Room Air) During Walk (Room Air) During Walk (Room Air)   SpO2 97 % 97 % 95 %   HR 75 bpm 81 bpm 85 bpm   Rate of Dyspnea 0 0 0   O2 Device None (Room air) None (Room air) None (Room air)   FIO2 (%) 21 % 21 % 21 %   Walk/Assistive Device --  Ambulation Ambulation       10/30/17 1505 10/30/17 1506   RT Walking Oximetry   Stage During Walk (Room Air) After Walk   SpO2 97 % 97 %   HR 78 bpm 80 bpm   Rate of Dyspnea 0 0   O2 Device None (Room air) None (Room air)   FIO2 (%) 21 % 21 %   Walk/Assistive Device Ambulation --

## 2017-10-30 NOTE — PROGRESS NOTES
NUTRITION COMPLETE ASSESSMENT    RECOMMENDATIONS:   1. Continue diet as ordered  2. Continue daily weights via standing scale (??15# difference x 3 days)     Interventions/Plan:   Food/Nutrient Delivery:    Commercial supplement       (Nepro)    Assessment:   Reason for Assessment:   [x]BPA/MST Referral     Diet:  Cardiac Regular  Nutritionally Significant Medications: [x] Reviewed  Meal Intake: Patient Vitals for the past 100 hrs:   % Diet Eaten   10/28/17 1410 75 %   10/28/17 1055 75 %     Subjective:  Pt in good spirits. Feels her appetite is greatly improving and she is asking about Nepro shakes. She has been told by her RD at the dialysis office to eat more protein and add shakes as able. Pt seen after breakfast meals. Noted plan now for discharge. Objective:  Chart reviewed, discussed with RN. Pt admitted with hypokalemia. PMHx: ESRD on PD secondary to lupus, DJD, HF, hypercholesterolemia, HTN, others noted. Pt with very poor appetite for 6 weeks PTA. Based on weight on admission, the pt lost 9% of her BW x 6 weeks. She attempts to drink Nepro when available. Will send BID while inpatient (850 kcal, 38 g protein). Encouraged her to consume protein sources first at all meals. Estimated Nutrition Needs:   Kcals/day: 4023 Kcals/day (2324-6058 kcal/day (MSJ x 1.2-1.3))  Protein: 93 g (1.2 g/kg)  Fluid: 1800 ml (or per renal)     Based On: [place] Jeor  Weight Used: Actual wt (77.4 kg)    Pt expected to meet estimated nutrient needs:  [x]   Yes  (with improved appetite)    Nutrition Diagnosis:   1.  Inadequate protein-energy intake related to decreased appetite, N/V as evidenced by minimal po intake x 1 month (improving), and severe weight loss (9%) x 6 weeks    Goals:     Pt to consume at least 50% of all meals and 1 Nepro supplement per day     Monitoring & Evaluation:    - Total energy intake, Liquid meal replacement   - Weight/weight change    Previous Nutrition Goals Met:  N/A  Previous Recommendations:      N/A    Education & Discharge Needs:   [] None Identified   [x] Identified and addressed    [x] Participated in care plan, discharge planning, and/or interdisciplinary rounds        Cultural, Christianity and ethnic food preferences identified:  NONE      Skin Integrity: []Intact  [x]Other  Edema: [x]None []Other  Last BM: PTA  Food Allergies: []None [x]Other: fish    Anthropometrics:    Weight Loss Metrics 10/30/2017 10/24/2017 10/16/2017 10/13/2017 9/15/2017 8/18/2017 7/21/2017   Today's Wt 170 lb 10.2 oz 155 lb 12.8 oz 171 lb 1.6 oz 156 lb 183 lb 183 lb 183 lb   BMI 28.4 kg/m2 25.93 kg/m2 28.47 kg/m2 25.96 kg/m2 30.45 kg/m2 30.45 kg/m2 30.45 kg/m2      Last 3 Recorded Weights in this Encounter    10/29/17 0552 10/30/17 0310 10/30/17 1446   Weight: 70.4 kg (155 lb 3.2 oz) 75.6 kg (166 lb 10.7 oz) 77.4 kg (170 lb 10.2 oz)      Weight Source: Standing scale (comment)  Height: 5' 5\" (165.1 cm),    Body mass index is 28.4 kg/(m^2). IBW : 56.7 kg (125 lb) (pt not aware of any goal dry wt set by nephro),    Usual Body Weight: 83 kg (183 lb),      Labs:  Lab Results   Component Value Date/Time    Sodium 133 10/30/2017 05:12 AM    Potassium 3.1 10/30/2017 05:12 AM    Chloride 97 10/30/2017 05:12 AM    CO2 27 10/30/2017 05:12 AM    Glucose 85 10/30/2017 05:12 AM    BUN 26 10/30/2017 05:12 AM    Creatinine 9.68 10/30/2017 05:12 AM    Calcium 7.1 10/30/2017 05:12 AM    Magnesium 1.9 10/30/2017 05:12 AM    Phosphorus 2.8 10/30/2017 05:12 AM    Albumin 2.0 10/27/2017 05:51 PM     Lab Results   Component Value Date/Time    Hemoglobin A1c 5.4 09/25/2015 02:02 PM     Lab Results   Component Value Date/Time    Glucose 85 10/30/2017 05:12 AM    Glucose (POC) 95 09/16/2015 12:08 PM    Glucose (POC) 116 09/03/2013 06:20 AM      Lab Results   Component Value Date/Time    ALT (SGPT) 15 10/27/2017 05:51 PM    AST (SGOT) 19 10/27/2017 05:51 PM    Alk.  phosphatase 64 10/27/2017 05:51 PM    Bilirubin, direct 0.1 07/13/2016 02:50 AM    Bilirubin, total 0.3 10/27/2017 05:51 PM      Melissa Bailon, [address] [facility]

## 2017-10-30 NOTE — DISCHARGE INSTRUCTIONS
Discharge Instructions       PATIENT ID: Jerome Chahal  MRN: [id number]   YOB: 1985    DATE OF ADMISSION: 10/27/2017  6:03 PM    DATE OF DISCHARGE: 10/30/2017    PRIMARY CARE PROVIDER: Hussein Ball NP     ATTENDING PHYSICIAN: Tarah Alicea MD  DISCHARGING PROVIDER: Stephie Llanes NP    To contact this individual call [phone] and ask the  to page. If unavailable ask to be transferred the Adult Hospitalist Department. DISCHARGE DIAGNOSES Pneumonia, Hypokalemia, Hypomagnesia, Hypocalcemia     CONSULTATIONS: IP CONSULT TO HOSPITALIST  IP CONSULT TO NEPHROLOGY    PROCEDURES/SURGERIES: * No surgery found *    PENDING TEST RESULTS:   At the time of discharge the following test results are still pending: none    FOLLOW UP APPOINTMENTS:   Follow-up Information     Follow up With Details Comments Contact Info    Hussein Ball NP In 1 week hospital follow up [address]isa [address]hta [address]      Sammi Hoang MD In 1 week follow up for your lupus [address] [facility] [address] [facility]      Hannah Rahman MD  Follow up within 1 week to have your labs rechecked [address] [facility]  [address]  [facility] [place].  [phone]             ADDITIONAL CARE RECOMMENDATIONS:   -Please see med list. You have been prescribed the following new medications Augmentin (antibiotic for possible pneumonia), Albuterol, Potassium and Magnesium.  See below for more information to include common side effects.   -Recommending following up with your Lupus doctor as this could be contributing to your symptoms.   -Follow up with Dr Deana Jimenes or your pcp to have your labs rechecked next week as your potassium, magnesium, and calcium were low here.   -Continue home PD schedule as directed    DIET: Renal     ACTIVITY: Activity as tolerated    WOUND CARE: none    EQUIPMENT needed: none      DISCHARGE MEDICATIONS:  Magnesium (By mouth)   Used as a mineral supplement to add to the magnesium in your daily diet. Brand Name(s): CalMag Thins, Coral Calcium, GNC Calcium/Magnesium with Vitamin D, Leader Magnesium, MG Plus Protein, MP Magnesium, Mag-G, Mag-Tab SR, Magimin-Forte, Maginex, Magnacaps, PharmAssure Magnesium, Concha Cotta, [employer] Magnesium, [employer] Natural Magnesium   There may be other brand names for this medicine. When This Medicine Should Not Be Used:   Unless your doctor tells you otherwise, there is no reason for you to not use this medicine. How to Use This Medicine:   Powder for Suspension, Liquid Filled Capsule, Capsule, Powder, Liquid, Tablet, Packet, Long Acting Tablet  · Your doctor will tell you how much medicine to use. Do not use more than directed. · Follow the instructions on the medicine label if you are using this medicine without a prescription. · Drink at least six to eight (8 ounce) cups of liquid each day, unless your doctor tells you otherwise. · Measure the oral liquid medicine with a marked measuring spoon, oral syringe, or medicine cup. If a dose is missed:   · Take a dose as soon as you remember. If it is almost time for your next dose, wait until then and take a regular dose. Do not take extra medicine to make up for a missed dose. How to Store and Dispose of This Medicine:   · Store the medicine in a closed container at room temperature, away from heat, moisture, and direct light. · Keep all medicine out of the reach of children. Never share your medicine with anyone. · Ask your pharmacist, doctor, or health caregiver about the best way to dispose of any outdated medicine or medicine no longer needed. Drugs and Foods to Avoid:   Ask your doctor or pharmacist before using any other medicine, including over-the-counter medicines, vitamins, and herbal products. · There are other medicines that may not work properly if you use them together with magnesium. Make sure your doctor knows about all other medicines you are using.   Warnings While Using This Medicine:   · Make sure your doctor knows if you are pregnant or breast feeding, or if you have kidney disease. Possible Side Effects While Using This Medicine: If you notice these less serious side effects, talk with your doctor:  · Nausea, diarrhea. If you notice other side effects that you think are caused by this medicine, tell your doctor. Call your doctor for medical advice about side effects. You may report side effects to FDA at 8-881-LVK-6802  © 2017 [address] Information is for End User's use only and may not be sold, redistributed or otherwise used for commercial purposes. The above information is an  only. It is not intended as medical advice for individual conditions or treatments. Talk to your doctor, nurse or pharmacist before following any medical regimen to see if it is safe and effective for you. Amoxicillin/Clavulanate Potassium (By mouth)   Amoxicillin (v-rfi-a-LEONELA-in), Clavulanate Potassium (KUEZ-yk-qh-gatito xnq-VGL-jb-um)  Treats infections. This medicine is a penicillin antibiotic. Brand Name(s): Augmentin, Augmentin ES-600, Augmentin XR   There may be other brand names for this medicine. When This Medicine Should Not Be Used: This medicine is not right for everyone. Do not use it if you had an allergic reaction to amoxicillin, clavulanate, or a similar antibiotic (penicillin or cephalosporin), or if you had liver problems caused by Augmentin®. How to Use This Medicine:   Liquid, Tablet, Chewable Tablet, Long Acting Tablet  · Your doctor will tell you how much medicine to use. Do not use more than directed. · Take this medicine with a snack or at the beginning of a meal to help prevent nausea. · Chewable tablets: Chew the tablet completely before you swallow it. · Measure the oral liquid medicine with a marked measuring spoon, oral syringe, or medicine cup. Shake the medicine well just before you measure each dose.  Rinse the spoon or dropper after each use. · Swallow the extended-release tablet whole. Do not crush, break, or chew it. · Take all of the medicine in your prescription to clear up your infection, even if you feel better after the first few doses. · Missed dose: Take a dose as soon as you remember. If it is almost time for your next dose, wait until then and take a regular dose. Do not take extra medicine to make up for a missed dose. · Tablet, extended-release tablet, chewable tablet: Store at room temperature, away from heat, moisture, and direct light. · Oral liquid: Store in the refrigerator. Do not freeze. · Throw away any unused oral liquid after 10 days. Drugs and Foods to Avoid:   Ask your doctor or pharmacist before using any other medicine, including over-the-counter medicines, vitamins, and herbal products. · Some medicines can affect how this medicine works. Tell your doctor if you are taking a blood thinner (such as warfarin), allopurinol, or probenecid. Warnings While Using This Medicine:   · Tell your doctor if you are pregnant or breastfeeding, or if you have kidney disease, liver disease, or mononucleosis (mono). · Birth control pills may not work as well while you are taking this medicine. Use another form of birth control to prevent pregnancy. · This medicine can cause diarrhea. Call your doctor if the diarrhea becomes severe, does not stop, or is bloody. Do not take any medicine to stop diarrhea until you have talked to your doctor. Diarrhea can occur 2 months or more after you stop taking this medicine. · Tell any doctor or dentist who treats you that you are using this medicine. This medicine may affect certain medical test results. · Call your doctor if your symptoms do not improve or if they get worse. · The chewable tablet and oral liquid contain phenylalanine. Talk to your doctor before you use this medicine if you have phenylketonuria (PKU).   · Keep all medicine out of the reach of children. Never share your medicine with anyone. Possible Side Effects While Using This Medicine:   Call your doctor right away if you notice any of these side effects:  · Allergic reaction: Itching or hives, swelling in your face or hands, swelling or tingling in your mouth or throat, chest tightness, trouble breathing  · Blistering, peeling, red skin rash  · Change in how much or how often you urinate  · Dark urine or pale stools, nausea, vomiting, loss of appetite, stomach pain, yellow eyes or skin  · Diarrhea that may contain blood, stomach cramps  If you notice these less serious side effects, talk with your doctor:   · Diaper rash  · Mild diarrhea, nausea, vomiting  · Tooth discoloration (in children)  If you notice other side effects that you think are caused by this medicine, tell your doctor. Call your doctor for medical advice about side effects. You may report side effects to FDA at 0-145-FDA-1042  © 2017 [address] Information is for End User's use only and may not be sold, redistributed or otherwise used for commercial purposes. The above information is an  only. It is not intended as medical advice for individual conditions or treatments. Talk to your doctor, nurse or pharmacist before following any medical regimen to see if it is safe and effective for you. See Medication Reconciliation Form    · It is important that you take the medication exactly as they are prescribed. · Keep your medication in the bottles provided by the pharmacist and keep a list of the medication names, dosages, and times to be taken in your wallet. · Do not take other medications without consulting your doctor. NOTIFY YOUR PHYSICIAN FOR ANY OF THE FOLLOWING:   Fever over 101 degrees for 24 hours. Chest pain, shortness of breath, fever, chills, nausea, vomiting, diarrhea, change in mentation, falling, weakness, bleeding. Severe pain or pain not relieved by medications.   Or, any other signs or symptoms that you may have questions about. DISPOSITION:  X  Home With:   OT  PT  HH  RN       SNF/Inpatient Rehab/LTAC    Independent/assisted living    Hospice    Other:     PROBLEM LIST Updated:   Yes X       Signed:   Micheal Lott NP  10/30/2017  3:22 PM

## 2017-10-30 NOTE — PROGRESS NOTES
Hospitalist Progress Note            Daily Progress Note: 10/30/2017    I have seen and examined the patient. Case discussed with NP, please see her more detailed note   I agree with the overall treatment plan as documented. Pneumonia, recurrent? The patient recently admitted and discharged on LVQ. Subsequently saw PMD where reportedly was feeling well. Comes again with cough and weakness on 10/27 with no leukocytosis, no fever, no change in CXR and is admitted with HCAP? ??. Lung examination reported by both the ER physician as well as admitting hospitalist as normal. Blood culture no growth. Continues to be on Vanc/Zosyn/LVQ??? Recent PE when on coumadin. Reportedly non compliant on coumadin.  Switched to Eliquis    ESRD on peritoneal dialysis    Lupus Outpatient follow up        Maura Rojas MD      Hospitalist, Internal Medicine            Blackberry number:  [phone]

## 2017-10-30 NOTE — PROGRESS NOTES
Name: Viet Corbin   MRN: [id number]  : 1985      Assessment             :                                                                                       Plan:  ESRD-followed by me as tzyvv-[id number]  Anemia  Hypomagnesemia  Hypokalemia  Hypocalcemia  Pneumonia- LLL  Recent PE- on Eliquis  Mild Hyponatremia ct CAPD with 1.5% bags 5X/day  IV ABx>>transition to PO per primary team     Ct EPO. I had just weaned her off of Clonidine as outpt; her BP are low nl to low. D/C Clonidine (done)  Also was started on low dose Coreg with goal of weaning off of Toprol as outpt; will hold off on Coreg for now since BP is low nl     Add oral KCL 40 meq daily -ordered  Encourage increase activity         Subjective:  Feels better. Residual dry cough.  Weak- but improved  Taking PO    ROS:   No nausea, no vomiting  No chest pain, improved shortness of breath    Exam:  Visit Vitals    BP (!) 124/99    Pulse 78    Temp 97.9 °F (36.6 °C)    Resp 14    Ht 5' 5\" (1.651 m)    Wt 75.6 kg (166 lb 10.7 oz)    SpO2 98%    BMI 27.73 kg/m2       WB/WN in NAD  LL rales+, no distress  RRR, distant  No edema  Soft, NT, PD cath site intact  AOx3    Current Facility-Administered Medications   Medication Dose Route Frequency Last Dose    potassium chloride SR (KLOR-CON 10) tablet 40 mEq  40 mEq Oral DAILY 40 mEq at 10/30/17 1024    piperacillin-tazobactam (ZOSYN) 4.5 g in 0.9% sodium chloride (MBP/ADV) 100 mL  4.5 g IntraVENous Q12H      allopurinol (ZYLOPRIM) tablet 100 mg  100 mg Oral DAILY 100 mg at 10/30/17 1020    amLODIPine (NORVASC) tablet 10 mg  10 mg Oral DAILY 10 mg at 10/30/17 1020    apixaban (ELIQUIS) tablet 5 mg  5 mg Oral BID 5 mg at 10/30/17 1024    ferric citrate (AURYXIA) tablet 420 mg (Patient Supplied)  420 mg Oral TID WITH MEALS 420 mg at 10/29/17 1839    calcitRIOL (ROCALTROL) capsule 0.25 mcg  0.25 mcg Oral DAILY 0.25 mcg at 10/30/17 1019    cyanocobalamin (VITAMIN B12) tablet 2,500 mcg  2,500 mcg Oral DAILY 2,500 mcg at 10/30/17 1024    hydroxychloroquine (PLAQUENIL) tablet 200 mg  200 mg Oral  mg at 10/29/17 1839    lactobac ac& pc-s.therm-b.anim (AJIT Q/RISAQUAD)  1 Cap Oral DAILY 1 Cap at 10/29/17 0858    metoprolol succinate (TOPROL-XL) XL tablet 100 mg  100 mg Oral DAILY 100 mg at 10/30/17 1024    pantoprazole (PROTONIX) tablet 40 mg  40 mg Oral ACB 40 mg at 10/30/17 7768    predniSONE (DELTASONE) tablet 9 mg  9 mg Oral DAILY WITH BREAKFAST 9 mg at 10/29/17 0857    senna (SENOKOT) tablet 8.6 mg  1 Tab Oral DAILY PRN      sodium chloride (NS) flush 5-10 mL  5-10 mL IntraVENous Q8H 10 mL at 10/30/17 0638    sodium chloride (NS) flush 5-10 mL  5-10 mL IntraVENous PRN      albuterol (PROVENTIL VENTOLIN) nebulizer solution 2.5 mg  2.5 mg Nebulization Q4H PRN      ondansetron (ZOFRAN) injection 4 mg  4 mg IntraVENous Q6H PRN      levoFLOXacin (LEVAQUIN) 500 mg in D5W IVPB  500 mg IntraVENous Q48H 500 mg at 10/29/17 2120    magnesium oxide (MAG-OX) tablet 400 mg  400 mg Oral  mg at 10/30/17 1023    oxyCODONE-acetaminophen (PERCOCET) 5-325 mg per tablet 1 Tab  1 Tab Oral Q4H PRN 1 Tab at 10/30/17 1024    epoetin pia (EPOGEN;PROCRIT) injection 10,000 Units  10,000 Units SubCUTAneous Q7D 10,000 Units at 10/28/17 1803    peritoneal dialysis DEXTROSE 1.5% (2.5 mEq/L low calcium) solution 2,000 mL  2,000 mL IntraPERitoneal 5XD 2,000 mL at 10/30/17 0501    oxyCODONE IR (ROXICODONE) tablet 5 mg  5 mg Oral BID PRN 5 mg at 10/29/17 2120    Vancomycin Pharmacy to Dose   Other Rx Dosing/Monitoring         Labs/Data:    Lab Results   Component Value Date/Time    WBC 9.1 10/30/2017 05:12 AM    Hemoglobin (POC) 11.2 09/16/2015 12:08 PM    HGB 8.9 10/30/2017 05:12 AM    Hematocrit (POC) 33 09/16/2015 12:08 PM    HCT 26.8 10/30/2017 05:12 AM    PLATELET 376 26/12/7344 05:12 AM    MCV 89.3 10/30/2017 05:12 AM       Lab Results   Component Value Date/Time    Sodium 133 10/30/2017 05:12 AM    Potassium 3.1 10/30/2017 05:12 AM    Chloride 97 10/30/2017 05:12 AM    CO2 27 10/30/2017 05:12 AM    Anion gap 9 10/30/2017 05:12 AM    Glucose 85 10/30/2017 05:12 AM    BUN 26 10/30/2017 05:12 AM    Creatinine 9.68 10/30/2017 05:12 AM    BUN/Creatinine ratio 3 10/30/2017 05:12 AM    GFR est AA 6 10/30/2017 05:12 AM    GFR est non-AA 5 10/30/2017 05:12 AM    Calcium 7.1 10/30/2017 05:12 AM       Wt Readings from Last 3 Encounters:   10/30/17 75.6 kg (166 lb 10.7 oz)   10/24/17 70.7 kg (155 lb 12.8 oz)   10/16/17 77.6 kg (171 lb 1.6 oz)         Intake/Output Summary (Last 24 hours) at 10/30/17 1026  Last data filed at 10/30/17 0645   Gross per 24 hour   Intake                0 ml   Output             3400 ml   Net            -3400 ml       Patient seen and examined. Chart reviewed. Labs, data and other pertinent notes reviewed in last 24 hrs.     PMH/SH/FH reviewed and unchanged compared to H&P    Discussed with pt      Pratik Soot MD

## 2017-10-30 NOTE — PROGRESS NOTES
I have reviewed discharge instructions with the patient. The patient verbalized understanding. Opportunity for questions given. Removed patient PIV and telemetry monitoring. Medication education given and reviewed with patient regarding new medications at discharge. Scripts faxed to local pharmacy. Follow up appt to be made by patient due to offices closed by discharge. Patient wheeled to discharge via RN.

## 2017-10-30 NOTE — PROGRESS NOTES
Bedside shift change report given to Carolyn (oncoming nurse) by Adriana Gonzalez (offgoing nurse). Report included the following information SBAR, Kardex, Intake/Output, MAR, Med Rec Status and Alarm Parameters    Adriana Gonzalez.

## 2017-10-30 NOTE — PROGRESS NOTES
Chart reviewed. Pt admitted on 10/27/17 for hypokalemia. Pt is peritoneal dialysis patient and receives home treatments on a daily basis- is seen at [facility] by Dr. Cristian Espino. Pt has PMH of lupus and ESRD. Pt lives with her parents and 8year old daughter. Pt has personal care consumer directed services provided by her family 5 hrs per day. No barriers to discharge identified/no home needs. Care Management Interventions  PCP Verified by CM: Yes  Mode of Transport at Discharge:  Other (see comment) (family)  MyChart Signup: No  Discharge Durable Medical Equipment: No  Health Maintenance Reviewed: Yes  Physical Therapy Consult: No  Occupational Therapy Consult: No  Speech Therapy Consult: No  Current Support Network: Relative's Home  Confirm Follow Up Transport: Family  Plan discussed with Pt/Family/Caregiver: Yes  Freedom of Choice Offered: Yes  Discharge Location  Discharge Placement: Home    DERRELL Valladares

## 2017-10-30 NOTE — DISCHARGE SUMMARY
Discharge Summary       PATIENT ID: Iona Nicole  MRN: [id number]   YOB: 1985    DATE OF ADMISSION: 10/27/2017  6:03 PM    DATE OF DISCHARGE: 10/30/2017  PRIMARY CARE PROVIDER: Domonique Romero NP     ATTENDING PHYSICIAN: Reese Lomeli  DISCHARGING PROVIDER: Yvonne Guillen NP    To contact this individual call [phone] and ask the  to page. If unavailable ask to be transferred the Adult Hospitalist Department. CONSULTATIONS: IP CONSULT TO HOSPITALIST  IP CONSULT TO NEPHROLOGY    PROCEDURES/SURGERIES: * No surgery found *    ADMITTING DIAGNOSES & HOSPITAL COURSE:   Dx: Pneumonia, Hypokalemia, Hypomagnesia, Hypocalcemia     She is a 33year old  [place] female with past medical history of ESRD on peritoneal   Dialysis who presented to the ED with the complaint of cough, generalized body weakness and malaise. Cxr done showed small left sided pleural effusion and lower lobe infiltrate. Unsure if fatigue related to continued CAP or lupus. Patient denies any shortness of breath or respiratory symptoms. 6 minute walk on room air completed patient did not drop <95%. Patient stable for discharge home. DISCHARGE DIAGNOSES / PLAN:      Possible pneumonia ? Healthcare vs community  cxr shows persistent left lower lobe infiltrate. No change from previous admission   She was recently discharged on the 10/17 for community acquired pna on levaquin. s/p vancomycin , zosyn and levaquin-> dc with Augmentin for 10 more days  No sputum culture done so far. Blood culture no growth. Albuterol prn      Hypokalemia/Hypocalcemia/Hypophosphatemia/Hypomagnesia-resolved  -replenished  -started on PO K and Mag  -repeat labs within 1 week      H/o lupus; continue plaquenil and prednisone     H/o Pulmonary embolism; she is on eliquis  Oxygen sat is stable.     ESRD on peritoneal dialysis; continue OP      Anemia of kidney disease;  on Epogen.        PENDING TEST RESULTS:   At the time of discharge the following test results are still pending: none    FOLLOW UP APPOINTMENTS:    Follow-up Information     Follow up With Details Comments Rajesh [place] NP In 1 week hospital follow up Luisa Mehta [address]      Susan Chiang MD In 1 week follow up for your lupus [address] [facility] [address]      Harika Holley MD  Follow up within 1 week to have your labs rechecked [address] [facility]   [address]  [phone]           ADDITIONAL CARE RECOMMENDATIONS:   -Please see med list. Naina Guerrero have been prescribed the following new medications Augmentin (antibiotic for possible pneumonia), Albuterol, Potassium and Magnesium. See below for more information to include common side effects.   -Recommending following up with your Lupus doctor as this could be contributing to your symptoms.   -Follow up with Dr Jada Rosado or your pcp to have your labs rechecked next week as your potassium, magnesium, and calcium were low here.   -Continue home PD schedule as directed      DIET: Renal     ACTIVITY: Activity as tolerated    WOUND CARE: none    EQUIPMENT needed: none      DISCHARGE MEDICATIONS:  Current Discharge Medication List      START taking these medications    Details   magnesium oxide (MAG-OX) 400 mg tablet Take 1 Tab by mouth two (2) times a day for 30 days. Qty: 60 Tab, Refills: 0      potassium chloride (K-DUR, KLOR-CON) 20 mEq tablet Take 2 Tabs by mouth daily for 7 days. Qty: 14 Tab, Refills: 0      amoxicillin-clavulanate (AUGMENTIN) 875-125 mg per tablet Take 1 Tab by mouth every twelve (12) hours. Indications: pneumonia  Qty: 14 Tab, Refills: 0      albuterol (PROVENTIL HFA, VENTOLIN HFA, PROAIR HFA) 90 mcg/actuation inhaler Take 1-2 Puffs by inhalation every four (4) hours as needed for Wheezing or Shortness of Breath.   Qty: 1 Inhaler, Refills: 2         CONTINUE these medications which have NOT CHANGED Details   apixaban (ELIQUIS) 5 mg tablet Take 2 Tabs by mouth two (2) times a day. For 6 days and then 1 tab twice daily  Qty: 70 Tab, Refills: 2      l.acidoph-B.lactis-B.longum (FLORAJEN3) 460 mg (7.5-6- 1.5 bill. cell) cap cap Take 1 Cap by mouth Daily (before breakfast). Qty: 5 Cap, Refills: 0      oxyCODONE IR (ROXICODONE) 5 mg immediate release tablet Take 1 tab in AM and HALF to 1 tab in PM if needed on days where back pain is severe     Qty: 40 Tab, Refills: 0    Associated Diagnoses: Chronic bilateral low back pain without sciatica      amLODIPine (NORVASC) 10 mg tablet Take 1 Tab by mouth daily. Qty: 30 Tab, Refills: 5    Associated Diagnoses: Malignant hypertension      metoprolol succinate (TOPROL-XL) 100 mg tablet Take 1 Tab by mouth daily. For blood pressure  Qty: 30 Tab, Refills: 5    Associated Diagnoses: Malignant hypertension      AURYXIA 210 mg iron tablet TK 2 TS PO WITH MEALS  Refills: 12      predniSONE (DELTASONE) 10 mg tablet Take 9 mg by mouth daily (with breakfast). ergocalciferol (VITAMIN D2) 50,000 unit capsule Take 50,000 Units by mouth every seven (7) days. hydroxychloroquine (PLAQUENIL) 200 mg tablet Take 200 mg by mouth two (2) times a day. ondansetron hcl (ZOFRAN) 8 mg tablet Take 1 Tab by mouth every eight (8) hours as needed. For Nausea  Indications: GASTROPARESIS  Qty: 15 Tab, Refills: 1      allopurinol (ZYLOPRIM) 100 mg tablet Take 100 mg by mouth daily. cyanocobalamin (VITAMIN B-12) 2,500 mcg sublingual tablet Take 1 Tab by mouth daily. Qty: 90 Tab, Refills: 1    Associated Diagnoses: Leukopenia; Low vitamin B12 level; Hypotension due to drugs      pantoprazole (PROTONIX) 40 mg tablet Take 1 Tab by mouth Daily (before breakfast). Qty: 30 Tab, Refills: 0      calcitRIOL (ROCALTROL) 0.25 mcg capsule Take 0.25 mcg by mouth daily. Refills: 5      senna (SENNA) 8.6 mg tablet Take 1 Tab by mouth daily as needed.          STOP taking these medications losartan (COZAAR) 50 mg tablet Comments:   Reason for Stopping:         cloNIDine HCl (CATAPRES) 0.3 mg tablet Comments:   Reason for Stopping:                 NOTIFY YOUR PHYSICIAN FOR ANY OF THE FOLLOWING:   Fever over 101 degrees for 24 hours. Chest pain, shortness of breath, fever, chills, nausea, vomiting, diarrhea, change in mentation, falling, weakness, bleeding. Severe pain or pain not relieved by medications. Or, any other signs or symptoms that you may have questions about. DISPOSITION:  X  Home With:   OT  PT  HH  RN       Long term SNF/Inpatient Rehab    Independent/assisted living    Hospice    Other:       PATIENT CONDITION AT DISCHARGE:     Functional status    Poor     Deconditioned    X Independent      Cognition    X Lucid     Forgetful     Dementia      Catheters/lines (plus indication)    Franco     PICC     PEG    X None      Code status    X Full code     DNR      PHYSICAL EXAMINATION AT DISCHARGE:  General: No acute distress, cooperative, pleasant   EENT: EOMI. Anicteric sclerae. Oral mucous moist, oropharynx benign  Resp: CTA bilaterally. No wheezing/rhonchi/rales. No accessory muscle use. Exp wheeze throughout  CV: Regular rhythm, normal rate, no murmurs, gallops, rubs  GI: Soft, non distended, non tender. normoactive bowel sounds,  LLQ abd PD site c/d/i  Extremities: No edema, warm, 2+ pulses throughout  Neurologic: Moves all extremities. AAOx3  Psych: Good insight. Not anxious nor agitated.   Skin: Good Turgor, no rashes or ulcers    CHRONIC MEDICAL DIAGNOSES:  Problem List as of 10/30/2017  Date Reviewed: 10/30/2017          Codes Class Noted - Resolved    * (Principal)Hypokalemia ICD-10-CM: E87.6  ICD-9-CM: 276.8  10/27/2017 - Present        Community acquired pneumonia of left lower lobe of lung ([facility] [address] ICD-10-CM: J18.1  ICD-9-CM: 841  10/14/2017 - Present        Pleural effusion, left ICD-10-CM: J90  ICD-9-CM: 511.9  10/14/2017 - Present        Hypertension (Chronic) ICD-10-CM: I10  ICD-9-CM: 401.9  10/14/2017 - Present        Chronic bilateral low back pain without sciatica ICD-10-CM: M54.5, G89.29  ICD-9-CM: 724.2, 338.29  5/26/2017 - Present        Anemia of renal disease ICD-10-CM: D63.1  ICD-9-CM: 285.21  2/21/2017 - Present        Dependence on peritoneal dialysis [facility]) ICD-10-CM: Z99.2  ICD-9-CM: V45.11  2/21/2017 - Present        ACP (advance care planning) ICD-10-CM: Z71.89  ICD-9-CM: V65.49  2/21/2017 - Present        ESRD on peritoneal dialysis [facility]) (Chronic) ICD-10-CM: N18.6, Z99.2  ICD-9-CM: 585.6, V45.11  10/7/2016 - Present        Malignant hypertension ICD-10-CM: I10  ICD-9-CM: 401.0  7/11/2016 - Present        Encounter for monitoring opioid maintenance therapy ICD-10-CM: Z51.81, Z79.891  ICD-9-CM: V58.83, V58.69  11/3/2015 - Present        Pulmonary embolism ([facility] ICD-10-CM: I26.99  ICD-9-CM: 415.19  5/8/2013 - Present        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  ICD-9-CM: 710.0, 583.81  3/10/2012 - Present        Dehydration ICD-10-CM: E86.0  ICD-9-CM: 276.51  3/10/2012 - Present        Lupus ICD-10-CM: L93.0  ICD-9-CM: 695.4  2/27/2012 - Present        RESOLVED: Idiopathic chronic inflammatory bowel disease ICD-10-CM: K63.89  ICD-9-CM: 558.9  8/28/2013 - 8/28/2013        RESOLVED: Abdominal pain ICD-10-CM: R10.9  ICD-9-CM: 789.00  8/28/2013 - 9/3/2013        RESOLVED: Hypoxia ICD-10-CM: R09.02  ICD-9-CM: 799.02  4/25/2013 - 4/30/2013        RESOLVED: Lupus nephritis ([facility] ICD-10-CM: M32.14  ICD-9-CM: 710.0, 583.81  4/27/2012 - 4/28/2012              Greater than 45 minutes were spent with the patient on counseling and coordination of care    Signed:   Conrad Kenney NP  10/30/2017  3:20 PM

## 2017-10-31 ENCOUNTER — PATIENT OUTREACH (OUTPATIENT)
Dept: FAMILY MEDICINE CLINIC | Age: 32
End: 2017-10-31

## 2017-10-31 NOTE — PROGRESS NOTES
Problem: Falls - Risk of  Goal: *Absence of Falls  Document Alex Fall Risk and appropriate interventions in the flowsheet. Outcome: Resolved/Met Date Met: 10/30/17  Fall Risk Interventions:          Medication Interventions: Assess postural VS orthostatic hypotension, Evaluate medications/consider consulting pharmacy, Patient to call before getting OOB, Teach patient to arise slowly         History of Falls Interventions: Consult care management for discharge planning, Door open when patient unattended, Evaluate medications/consider consulting pharmacy, Investigate reason for fall     Pt had no falls during admission. Call bell and belongings remained within reach.

## 2017-10-31 NOTE — PROGRESS NOTES
Problem: Pneumonia: Discharge Outcomes  Goal: *Optimal pain control at patient's stated goal  Outcome: Resolved/Met Date Met: 10/30/17  Pt stated pain goal of 4. Goal of 4 achieved after giving PRN Percocet at 1024.

## 2017-10-31 NOTE — PROGRESS NOTES
Benjamín Aggarwal is a 33 y.o. female   This patient was received as a referral from [facility] for an admission to [facility] on 10/27/17 for Dx: Pneumonia, Hypokalemia, Hypomagnesia, Hypocalcemia   Inpatient RRAT Score: 12  Patient's challenges to self management identified: Patient states she is feeling much better. Medication Management: good adherence, good understanding  Summary of patients top three problems:     Problem 1: Pneumonia- admission Latic Acid 2.8 on admission 10/27/17. Chest xray results: Study Result   Exam:  2 view chest     Indication: Follow-up pneumonia.     COMPARISON: 10/14/2017 and 6/26/2017     PA and lateral views demonstrate normal heart size.     The left-sided pleural effusion persists but has slightly decreased. Aeration  has improved in left lower lobe with decreasing areas of airspace opacity. Right  lung is clear.       IMPRESSION:  1. There has been some improvement of the chest but incomplete resolution of the  airspace opacities and the left pleural effusion. Continued follow-up is  suggested   Patient denies fever at this time. Admits to taking medications as ordered       Problem 2: Generalized weakness- Patient states she has no fever or cough, slightly anemic- HGB 8.9     Problem 3: EDRD- patient on peritoneal dialysis, low sodium, potassium, and calcium on admission. Patients motivational level on a scale of 0-10: 7  . Current Outpatient Prescriptions   Medication Sig    magnesium oxide (MAG-OX) 400 mg tablet Take 1 Tab by mouth two (2) times a day for 30 days.  potassium chloride (K-DUR, KLOR-CON) 20 mEq tablet Take 2 Tabs by mouth daily for 7 days.  amoxicillin-clavulanate (AUGMENTIN) 875-125 mg per tablet Take 1 Tab by mouth every twelve (12) hours. Indications: pneumonia    albuterol (PROVENTIL HFA, VENTOLIN HFA, PROAIR HFA) 90 mcg/actuation inhaler Take 1-2 Puffs by inhalation every four (4) hours as needed for Wheezing or Shortness of Breath.     apixaban (ELIQUIS) 5 mg tablet Take 2 Tabs by mouth two (2) times a day. For 6 days and then 1 tab twice daily    l.acidoph-B.lactis-B.longum (FLORAJEN3) 460 mg (7.5-6- 1.5 bill. cell) cap cap Take 1 Cap by mouth Daily (before breakfast).  oxyCODONE IR (ROXICODONE) 5 mg immediate release tablet Take 1 tab in AM and HALF to 1 tab in PM if needed on days where back pain is severe       amLODIPine (NORVASC) 10 mg tablet Take 1 Tab by mouth daily.  metoprolol succinate (TOPROL-XL) 100 mg tablet Take 1 Tab by mouth daily. For blood pressure    AURYXIA 210 mg iron tablet TK 2 TS PO WITH MEALS    predniSONE (DELTASONE) 10 mg tablet Take 9 mg by mouth daily (with breakfast).  ergocalciferol (VITAMIN D2) 50,000 unit capsule Take 50,000 Units by mouth every seven (7) days.  hydroxychloroquine (PLAQUENIL) 200 mg tablet Take 200 mg by mouth two (2) times a day.  ondansetron hcl (ZOFRAN) 8 mg tablet Take 1 Tab by mouth every eight (8) hours as needed. For Nausea  Indications: GASTROPARESIS    allopurinol (ZYLOPRIM) 100 mg tablet Take 100 mg by mouth daily.  cyanocobalamin (VITAMIN B-12) 2,500 mcg sublingual tablet Take 1 Tab by mouth daily.  pantoprazole (PROTONIX) 40 mg tablet Take 1 Tab by mouth Daily (before breakfast).  calcitRIOL (ROCALTROL) 0.25 mcg capsule Take 0.25 mcg by mouth daily.  senna (SENNA) 8.6 mg tablet Take 1 Tab by mouth daily as needed. No current facility-administered medications for this visit. .  Goals        Patient [facility] (pt-stated)            10/19/17- NN did not discuss this with patient. Will plan to discuss with patient during upcoming call. sc       Effective airway clearance r/t to pneumonia (pt-stated)            10/19/17- patient admitted to [facility] 10/14/17-10/17/17 for pneumonia. Patient will assess respirations ie shortness of breath. Patient will cough to promote removal of secretions to improve breathing.  Patient will increase ambulation to mobilize secretions. sc   10/31/17-       Prevention of infection (pt-stated)            10/19/17- patient will assess/closely monitor her temperatures  and report fevers greater than 101. Patient will assess hydration status by consuming at least six cups of water to avoid dehydration. Patient will take Levaquin as prescribed. sc  10/31/17- NN instructed patient on importance of taking all medications as ordered, follow-ups as scheduled- PK    Call your doctor now or seek immediate medical care if:  ? · You cough up dark brown or bloody mucus (sputum). ? · You have new or worse trouble breathing. ? · You are dizzy or lightheaded, or you feel like you may faint. ? Watch closely for changes in your health, and be sure to contact your doctor if:  ? · You have a new or higher fever. ? · You are coughing more deeply or more often. ? · You are not getting better after 2 days (48 hours). ? · You do not get better as expected. Patient verbalized understanding of all information discussed. Patient has this Nurse Navigators contact information for any further questions, concerns, or needs.

## 2017-10-31 NOTE — PATIENT INSTRUCTIONS
Pneumonia: Care Instructions  Your Care Instructions    Pneumonia is an infection of the lungs. Most cases are caused by infections from bacteria or viruses. Pneumonia may be mild or very severe. If it is caused by bacteria, you will be treated with antibiotics. It may take a few weeks to a few months to recover fully from pneumonia, depending on how sick you were and whether your overall health is good. Follow-up care is a key part of your treatment and safety. Be sure to make and go to all appointments, and call your doctor if you are having problems. It's also a good idea to know your test results and keep a list of the medicines you take. How can you care for yourself at home? · Take your antibiotics exactly as directed. Do not stop taking the medicine just because you are feeling better. You need to take the full course of antibiotics. · Take your medicines exactly as prescribed. Call your doctor if you think you are having a problem with your medicine. · Get plenty of rest and sleep. You may feel weak and tired for a while, but your energy level will improve with time. · To prevent dehydration, drink plenty of fluids, enough so that your urine is light yellow or clear like water. Choose water and other caffeine-free clear liquids until you feel better. If you have kidney, heart, or liver disease and have to limit fluids, talk with your doctor before you increase the amount of fluids you drink. · Take care of your cough so you can rest. A cough that brings up mucus from your lungs is common with pneumonia. It is one way your body gets rid of the infection. But if coughing keeps you from resting or causes severe fatigue and chest-wall pain, talk to your doctor. He or she may suggest that you take a medicine to reduce the cough. · Use a vaporizer or humidifier to add moisture to your bedroom. Follow the directions for cleaning the machine. · Do not smoke or allow others to smoke around you.  Smoke will make your cough last longer. If you need help quitting, talk to your doctor about stop-smoking programs and medicines. These can increase your chances of quitting for good. · Take an over-the-counter pain medicine, such as acetaminophen (Tylenol), ibuprofen (Advil, Motrin), or naproxen (Aleve). Read and follow all instructions on the label. · Do not take two or more pain medicines at the same time unless the doctor told you to. Many pain medicines have acetaminophen, which is Tylenol. Too much acetaminophen (Tylenol) can be harmful. · If you were given a spirometer to measure how well your lungs are working, use it as instructed. This can help your doctor tell how your recovery is going. · To prevent pneumonia in the future, talk to your doctor about getting a flu vaccine (once a year) and a pneumococcal vaccine (one time only for most people). When should you call for help? Call 911 anytime you think you may need emergency care. For example, call if:  ? · You have severe trouble breathing. ?Call your doctor now or seek immediate medical care if:  ? · You cough up dark brown or bloody mucus (sputum). ? · You have new or worse trouble breathing. ? · You are dizzy or lightheaded, or you feel like you may faint. ? Watch closely for changes in your health, and be sure to contact your doctor if:  ? · You have a new or higher fever. ? · You are coughing more deeply or more often. ? · You are not getting better after 2 days (48 hours). ? · You do not get better as expected. Where can you learn more? Go to http://jorge-rochelle.info/. Enter [id number] in the search box to learn more about \"Pneumonia: Care Instructions. \"  Current as of: May 12, 2017  Content Version: 11.4  © 4805-5300 [employer]. Care instructions adapted under license by [employer] (which disclaims liability or warranty for this information).  If you have questions about a medical condition or this instruction, always ask your healthcare professional. [place] [address] any warranty or liability for your use of this information.

## 2017-11-01 ENCOUNTER — TELEPHONE (OUTPATIENT)
Dept: FAMILY MEDICINE CLINIC | Age: 32
End: 2017-11-01

## 2017-11-01 LAB
BACTERIA SPEC CULT: NORMAL
SERVICE CMNT-IMP: NORMAL

## 2017-11-01 NOTE — TELEPHONE ENCOUNTER
Ammy Sanders    [phone]         Patient had an appt scheduled for today with Ada @ 2:00 Transition of care will not make it because she got out of the hospital-  Want to know if David Balderrama could see her on Monday 11-6-2017

## 2017-11-01 NOTE — TELEPHONE ENCOUNTER
[phone] attempted to call patient no answer notified via VM appointment is for 11/6/2017 at 10:30A to call us back if she has question

## 2017-11-06 ENCOUNTER — OFFICE VISIT (OUTPATIENT)
Dept: FAMILY MEDICINE CLINIC | Age: 32
End: 2017-11-06

## 2017-11-06 ENCOUNTER — HOSPITAL ENCOUNTER (OUTPATIENT)
Dept: LAB | Age: 32
Discharge: HOME | End: 2017-11-06
Payer: MEDICARE

## 2017-11-06 VITALS
SYSTOLIC BLOOD PRESSURE: 158 MMHG | RESPIRATION RATE: 13 BRPM | HEIGHT: 65 IN | TEMPERATURE: 98.9 F | BODY MASS INDEX: 27.82 KG/M2 | WEIGHT: 167 LBS | OXYGEN SATURATION: 97 % | HEART RATE: 82 BPM | DIASTOLIC BLOOD PRESSURE: 120 MMHG

## 2017-11-06 DIAGNOSIS — N18.9 ANEMIA OF RENAL DISEASE: ICD-10-CM

## 2017-11-06 DIAGNOSIS — I26.99 OTHER ACUTE PULMONARY EMBOLISM WITHOUT ACUTE COR PULMONALE (HCC): ICD-10-CM

## 2017-11-06 DIAGNOSIS — I10 ESSENTIAL HYPERTENSION: Chronic | ICD-10-CM

## 2017-11-06 DIAGNOSIS — R68.89 OTHER GENERAL SYMPTOMS AND SIGNS: ICD-10-CM

## 2017-11-06 DIAGNOSIS — D63.1 ANEMIA OF RENAL DISEASE: ICD-10-CM

## 2017-11-06 DIAGNOSIS — J18.9 PNEUMONIA OF LEFT LOWER LOBE DUE TO INFECTIOUS ORGANISM: Primary | ICD-10-CM

## 2017-11-06 DIAGNOSIS — E83.51 HYPOCALCEMIA: ICD-10-CM

## 2017-11-06 DIAGNOSIS — Z99.2 DEPENDENCE ON PERITONEAL DIALYSIS (HCC): ICD-10-CM

## 2017-11-06 DIAGNOSIS — N18.6 END STAGE RENAL DISEASE (HCC): ICD-10-CM

## 2017-11-06 PROCEDURE — 83735 ASSAY OF MAGNESIUM: CPT

## 2017-11-06 PROCEDURE — 80048 BASIC METABOLIC PNL TOTAL CA: CPT

## 2017-11-06 PROCEDURE — 82607 VITAMIN B-12: CPT

## 2017-11-06 PROCEDURE — 82306 VITAMIN D 25 HYDROXY: CPT

## 2017-11-06 PROCEDURE — 36415 COLL VENOUS BLD VENIPUNCTURE: CPT

## 2017-11-06 PROCEDURE — 84100 ASSAY OF PHOSPHORUS: CPT

## 2017-11-06 RX ORDER — AZATHIOPRINE 50 MG/1
50 TABLET ORAL
COMMUNITY
Start: 2017-11-03 | End: 2022-05-08

## 2017-11-06 RX ORDER — CARVEDILOL 3.12 MG/1
TABLET ORAL
Refills: 0 | Status: ON HOLD | COMMUNITY
Start: 2017-10-23 | End: 2017-11-28 | Stop reason: DRUGHIGH

## 2017-11-06 NOTE — PROGRESS NOTES
Ms. Lo Arizmendi is a 33y.o. year old female, she is seen today for Transition of Care services following a hospital discharge for pneumonia on 10/30/17. Our office Nurse Navigator performed an outreach to Ms. Conley Sandhoff on 10/31/17 (within 2 business days of discharge) to complete medication reconciliation and a telephonic assessment of her condition. Subjective:  Hospital Follow up  Patient seen for hospital follow up for pneumonia. Patient presented to [facility] ED on 10/27 with complaint of cough and myalgias. Chest X-ray showed small left sided pleural effusion and lower lobe infiltrate. She had been previously treated for pneumonia on 10/14 with levofloxacin and Augmentin. She was admitted and received vancomycin , zosyn and levaquin She was discharged on Augmentin for 10 more days. Blood cultures negative. Hypokalemia, Hypocalcemia, Hypophosphatemia and Hypomagnesia were treated during admission. Repeat testing needed. Patient to follow up with nephrology next week. She reports an increase in peripheral edema without chest pain, PNP, orthopnea or SOB. Prior to Admission medications    Medication Sig Start Date End Date Taking? Authorizing Provider   carvedilol (COREG) 3.125 mg tablet TK 1 T PO  BID 10/23/17  Yes Historical Provider   magnesium oxide (MAG-OX) 400 mg tablet Take 1 Tab by mouth two (2) times a day for 30 days. 10/30/17 11/29/17 Yes Adrianne Rosen NP   potassium chloride (K-DUR, KLOR-CON) 20 mEq tablet Take 2 Tabs by mouth daily for 7 days. 10/30/17 11/6/17 Yes Adrianne Rosen NP   amoxicillin-clavulanate (AUGMENTIN) 875-125 mg per tablet Take 1 Tab by mouth every twelve (12) hours. Indications: pneumonia 10/30/17  Yes Adrianne Rosen NP   albuterol (PROVENTIL HFA, VENTOLIN HFA, PROAIR HFA) 90 mcg/actuation inhaler Take 1-2 Puffs by inhalation every four (4) hours as needed for Wheezing or Shortness of Breath.  10/30/17  Yes Adrianne Rosen NP   apixaban (ELIQUIS) 5 mg tablet Take 2 Tabs by mouth two (2) times a day. For 6 days and then 1 tab twice daily 10/17/17  Yes Stan Haque MD   lLorenzoacidoph-B.lactis-BLorenzolongum [facility]) 460 mg (7.5-6- 1.5 bill. cell) cap cap Take 1 Cap by mouth Daily (before breakfast). 10/17/17  Yes Stan Haque MD   oxyCODONE IR (ROXICODONE) 5 mg immediate release tablet Take 1 tab in AM and HALF to 1 tab in PM if needed on days where back pain is severe    10/13/17  Yes Roberto Liu MD   amLODIPine (NORVASC) 10 mg tablet Take 1 Tab by mouth daily. 10/7/16  Yes Governor Yogesh NP   metoprolol succinate (TOPROL-XL) 100 mg tablet Take 1 Tab by mouth daily. For blood pressure 10/7/16  Yes Governor Yogesh NP   AURYXIA 210 mg iron tablet TK 2 TS PO WITH MEALS 7/20/16  Yes Historical Provider   predniSONE (DELTASONE) 10 mg tablet Take 9 mg by mouth daily (with breakfast). Yes Historical Provider   ergocalciferol (VITAMIN D2) 50,000 unit capsule Take 50,000 Units by mouth every seven (7) days. Yes Darnell Franks MD   hydroxychloroquine (PLAQUENIL) 200 mg tablet Take 200 mg by mouth two (2) times a day. Yes Darnell Franks MD   ondansetron hcl (ZOFRAN) 8 mg tablet Take 1 Tab by mouth every eight (8) hours as needed. For Nausea  Indications: GASTROPARESIS 7/14/16  Yes Nestor Tobar MD   allopurinol (ZYLOPRIM) 100 mg tablet Take 100 mg by mouth daily. 10/15/15  Yes Historical Provider   cyanocobalamin (VITAMIN B-12) 2,500 mcg sublingual tablet Take 1 Tab by mouth daily. 10/27/15  Yes Dex Hardy MD   pantoprazole (PROTONIX) 40 mg tablet Take 1 Tab by mouth Daily (before breakfast). 10/23/15  Yes Lucia Zaldivar MD   calcitRIOL (ROCALTROL) 0.25 mcg capsule Take 0.25 mcg by mouth daily. 8/6/15  Yes Historical Provider   senna (SENNA) 8.6 mg tablet Take 1 Tab by mouth daily as needed.    Yes Historical Provider   azaTHIOprine (IMURAN) 50 mg tablet  11/3/17   Historical Provider          Allergies   Allergen Reactions    Compazine [Prochlorperazine [place] Speed Nausea and Vomiting and Palpitations    Fish Containing Products Rash     An itchy rash appeared in about 1 hour           ROS  See HPI    Objective:   Physical Exam   Constitutional: She is oriented to person, place, and time. She appears well-developed and well-nourished. Neck: Normal range of motion. Neck supple. No JVD present. Carotid bruit is not present. No thyromegaly present. Cardiovascular: Normal rate, regular rhythm and intact distal pulses. Exam reveals no gallop and no friction rub. No murmur heard. Pulmonary/Chest: Effort normal and breath sounds normal. No respiratory distress. She has no wheezes. She has no rales. Musculoskeletal: She exhibits edema (nonpitting edema of lower legs). Lymphadenopathy:     She has no cervical adenopathy. Neurological: She is alert and oriented to person, place, and time. Psychiatric: She has a normal mood and affect. Her behavior is normal.   Nursing note and vitals reviewed. Visit Vitals    BP (!) 158/120 (BP 1 Location: Right arm, BP Patient Position: Sitting)    Pulse 82    Temp 98.9 °F (37.2 °C) (Oral)    Resp 13    Ht 5' 5\" (1.651 m)    Wt 167 lb (75.8 kg)    SpO2 97%    BMI 27.79 kg/m2       Assessment & Plan:  Diagnoses and all orders for this visit:    1. Pneumonia of left lower lobe due to infectious organism [facility])  Repeat CXR to check for improvement/resolution. -     XR CHEST PA LAT; Future    2. Other acute pulmonary embolism without acute cor pulmonale (HCC)  Reports compliance with Eliquis. 3. Dependence on peritoneal dialysis [facility])  Managed by nephrology. Recheck electrolytes. -     MAGNESIUM  -     PHOSPHORUS  -     VITAMIN D, 25 HYDROXY    4. Essential hypertension  Poorly controlled in clinic. Continue home monitoring and call for reading >140/90. Follow up with nephrology for management. 5. Anemia of renal disease  Not currently on Epogen. Recheck CBC and iron levels  -     ANEMIA PROFILE B    6. Hypocalcemia  -     METABOLIC PANEL, BASIC    7. End stage renal disease ([facility] [address]   Managed by nephrology, no changes  -     MAGNESIUM  -     PHOSPHORUS  -     METABOLIC PANEL, BASIC  -     VITAMIN D, 25 HYDROXY  -     ANEMIA PROFILE B      I have discussed the diagnosis with the patient and the intended plan as seen in the above orders. The patient has received an after-visit summary along with patient information handout. I have discussed medication side effects and warnings with the patient as well. Follow-up Disposition:  Return in about 4 weeks (around 12/4/2017) for disease management.         Marjorie Stringer NP

## 2017-11-06 NOTE — PROGRESS NOTES
Chief Complaint   Patient presents with   [facility] Follow Up     admitted to [facility]- 10/27/17- pnemonia     1. Have you been to the ER, urgent care clinic since your last visit? Hospitalized since your last visit? Yes- [facility]- 10/23/17- pneumonia    2. Have you seen or consulted any other health care providers outside of the [facility] since your last visit? Include any pap smears or colon screening.  No

## 2017-11-06 NOTE — LETTER
11/13/2017 1:08 PM 
 
Ms. Kris Hui [address] [facility] [id number] Dear Kris Hui: 
 
Please find your most recent results below. Resulted Orders MAGNESIUM Result Value Ref Range Magnesium 2.2 1.6 - 2.3 mg/dL Narrative Performed at:  [address]  [id number] : Tammy Jackson MD, Phone:  [phone] PHOSPHORUS Result Value Ref Range Phosphorus 4.4 2.5 - 4.5 mg/dL Narrative Performed at:  [address]  [id number] : Tammy Jackson MD, Phone:  [phone] METABOLIC PANEL, BASIC Result Value Ref Range Glucose 87 65 - 99 mg/dL BUN 21 (H) 6 - 20 mg/dL Comment: **Verified by repeat analysis** Creatinine 8.95 (H) 0.57 - 1.00 mg/dL Comment: **Verified by repeat analysis**  
 GFR est non-AA 5 (L) >59 mL/min/1.73 GFR est AA 6 (L) >59 mL/min/1.73  
 BUN/Creatinine ratio 2 (L) 9 - 23 Sodium 135 134 - 144 mmol/L Potassium 3.8 3.5 - 5.2 mmol/L Chloride 92 (L) 96 - 106 mmol/L  
 CO2 26 18 - 29 mmol/L Calcium 10.1 8.7 - 10.2 mg/dL Narrative Performed at:  [address]  [id number] : Tammy Jackson MD, Phone:  [phone] VITAMIN D, 25 HYDROXY Result Value Ref Range VITAMIN D, 25-HYDROXY 15.0 (L) 30.0 - 100.0 ng/mL Comment:  
   Vitamin D deficiency has been defined by the [address] practice guideline as a 
level of serum 25-OH vitamin D less than 20 ng/mL (1,2). The Endocrine Society went on to further define vitamin D 
insufficiency as a level between 21 and 29 ng/mL (2). 1. IOM ([place] of Medicine). 2010. Dietary reference 
   intakes for calcium and D. [facility] 
   [employer].  
2. Cara EUBANKS, Nidhi HAY, Jeanette RODRIGUEZ, et al. 
 Evaluation, treatment, and prevention of vitamin D 
   deficiency: an Endocrine Society clinical practice 
   guideline. JCEM. 2011 Jul; 96(7):1911-30. Narrative Performed at:  [address]  [id number] : Katlin Shearer MD, Phone:  [phone] ANEMIA PROFILE B Result Value Ref Range TIBC 148 (L) 250 - 450 ug/dL UIBC 76 (L) 131 - 425 ug/dL Iron 72 27 - 159 ug/dL Iron % saturation 49 15 - 55 % Ferritin 385 (H) 15 - 150 ng/mL Vitamin B12 >2000 (H) 211 - 946 pg/mL Folate 3.9 >3.0 ng/mL Comment: A serum folate concentration of less than 3.1 ng/mL is 
considered to represent clinical deficiency. WBC 3.6 3.4 - 10.8 x10E3/uL  
 RBC 3.45 (L) 3.77 - 5.28 x10E6/uL HGB 10.5 (L) 11.1 - 15.9 g/dL HCT 31.0 (L) 34.0 - 46.6 % MCV 90 79 - 97 fL  
 MCH 30.4 26.6 - 33.0 pg  
 MCHC 33.9 31.5 - 35.7 g/dL  
 RDW 16.8 (H) 12.3 - 15.4 % PLATELET 785 592 - 157 x10E3/uL NEUTROPHILS 43 Not Estab. % Lymphocytes 46 Not Estab. % MONOCYTES 9 Not Estab. % EOSINOPHILS 2 Not Estab. % BASOPHILS 0 Not Estab. %  
 ABS. NEUTROPHILS 1.6 1.4 - 7.0 x10E3/uL Abs Lymphocytes 1.6 0.7 - 3.1 x10E3/uL  
 ABS. MONOCYTES 0.3 0.1 - 0.9 x10E3/uL  
 ABS. EOSINOPHILS 0.1 0.0 - 0.4 x10E3/uL  
 ABS. BASOPHILS 0.0 0.0 - 0.2 x10E3/uL IMMATURE GRANULOCYTES 0 Not Estab. %  
 ABS. IMM. GRANS. 0.0 0.0 - 0.1 x10E3/uL Reticulocyte count 1.6 0.6 - 2.6 % Narrative Performed at:  [address]  [id number] : Katlin Shearer MD, Phone:  [phone] CKD REPORT Result Value Ref Range Interpretation Note Comment:  
   Supplemental report is available. Narrative Performed at:  [address] [place] [address]  [id number] : Kalani Motley PhD, Phone:  [phone] Please call me if you have any questions: [phone] Sincerely, Renetta Muse NP

## 2017-11-06 NOTE — MR AVS SNAPSHOT
Visit Information Date & Time Provider Department Dept. Phone Encounter #  
 11/6/2017 10:30 AM Renetta Muse  [facility] [address] [phone] [id number] Your Appointments 11/10/2017 10:20 AM  
Follow Up with Rodriguez Francois MD  
[facility]) Appt Note: follow up pain mgt sck [address] [facility] [address] [facility] [address] [phone]  
  
   
 [address] [address] [address] Upcoming Health Maintenance Date Due DTaP/Tdap/Td series (1 - Tdap) 2/17/2007 Pneumococcal 19-64 Highest Risk (2 of 3 - PCV13) 10/1/2010 PAP AKA CERVICAL CYTOLOGY 4/13/2019 Allergies as of 11/6/2017  Review Complete On: 11/6/2017 By: Sherry rCowley LPN Severity Noted Reaction Type Reactions Compazine [Prochlorperazine Edisylate] High 07/11/2016   Systemic Nausea and Vomiting, Palpitations Fish Containing Products High 02/21/2017   Side Effect Rash An itchy rash appeared in about 1 hour Current Immunizations  Reviewed on 10/30/2017 Name Date Influenza Vaccine 9/12/2017, 9/9/2012 Influenza Vaccine Split 9/1/2011 ZZZ-RETIRED (DO NOT USE) Pneumococcal Vaccine (Unspecified Type) 10/1/2009 Not reviewed this visit You Were Diagnosed With   
  
 Codes Comments Pneumonia of left lower lobe due to infectious organism [facility])    -  Primary ICD-10-CM: J18.1 ICD-9-CM: 076 Other acute pulmonary embolism without acute cor pulmonale (HCC)     ICD-10-CM: I26.99 
ICD-9-CM: 415.19 Dependence on peritoneal dialysis [facility])     ICD-10-CM: Z99.2 ICD-9-CM: V45.11 Essential hypertension     ICD-10-CM: I10 
ICD-9-CM: 401.9 Anemia of renal disease     ICD-10-CM: D63.1 ICD-9-CM: 285.21 Hypocalcemia     ICD-10-CM: E83.51 
ICD-9-CM: 275.41 End stage renal disease ([facility] [address]     ICD-10-CM: N18.6 ICD-9-CM: 585.6 Other general symptoms and signs     ICD-10-CM: R68.89 ICD-9-CM: 780.99 Vitals BP Pulse Temp Resp Height(growth percentile) Weight(growth percentile) (!) 158/120 (BP 1 Location: Right arm, BP Patient Position: Sitting) 82 98.9 °F (37.2 °C) (Oral) 13 5' 5\" (1.651 m) 167 lb (75.8 kg) SpO2 BMI OB Status Smoking Status 97% 27.79 kg/m2 Medically Induced Never Smoker Vitals History BMI and BSA Data Body Mass Index Body Surface Area  
 27.79 kg/m 2 1.86 m 2 Preferred Pharmacy Pharmacy Name Phone [address], [address] Your Updated Medication List  
  
   
This list is accurate as of: 11/6/17 11:35 AM.  Always use your most recent med list.  
  
  
  
  
 albuterol 90 mcg/actuation inhaler Commonly known as:  PROVENTIL HFA, VENTOLIN HFA, PROAIR HFA Take 1-2 Puffs by inhalation every four (4) hours as needed for Wheezing or Shortness of Breath. allopurinol 100 mg tablet Commonly known as:  Pilar Bolus Take 100 mg by mouth daily. amLODIPine 10 mg tablet Commonly known as:  Horris Bills Take 1 Tab by mouth daily. amoxicillin-clavulanate 875-125 mg per tablet Commonly known as:  AUGMENTIN Take 1 Tab by mouth every twelve (12) hours. Indications: pneumonia  
  
 apixaban 5 mg tablet Commonly known as:  Nieto Susan Take 2 Tabs by mouth two (2) times a day. For 6 days and then 1 tab twice daily AURYXIA 210 mg iron tablet Generic drug:  ferric citrate TK 2 TS PO WITH MEALS  
  
 azaTHIOprine 50 mg tablet Commonly known as:  IMURAN  
  
 calcitRIOL 0.25 mcg capsule Commonly known as:  ROCALTROL Take 0.25 mcg by mouth daily. carvedilol 3.125 mg tablet Commonly known as:  COREG  
TK 1 T PO  BID  
  
 cyanocobalamin 2,500 mcg sublingual tablet Commonly known as:  VITAMIN B-12 Take 1 Tab by mouth daily. l.acidoph-B.lactis-B.longum 460 mg (7.5-6- 1.5 bill. cell) Cap cap Commonly known as:  JVBVLWVC5 Take 1 Cap by mouth Daily (before breakfast). magnesium oxide 400 mg tablet Commonly known as:  MAG-OX Take 1 Tab by mouth two (2) times a day for 30 days. metoprolol succinate 100 mg tablet Commonly known as:  TOPROL-XL Take 1 Tab by mouth daily. For blood pressure  
  
 ondansetron hcl 8 mg tablet Commonly known as:  Larios Magi Take 1 Tab by mouth every eight (8) hours as needed. For Nausea  Indications: GASTROPARESIS  
  
 oxyCODONE IR 5 mg immediate release tablet Commonly known as:  Yoli Nakai Take 1 tab in AM and HALF to 1 tab in PM if needed on days where back pain is severe  
  
 pantoprazole 40 mg tablet Commonly known as:  PROTONIX Take 1 Tab by mouth Daily (before breakfast). PLAQUENIL 200 mg tablet Generic drug:  hydroxychloroquine Take 200 mg by mouth two (2) times a day. potassium chloride 20 mEq tablet Commonly known as:  K-DUR, KLOR-CON Take 2 Tabs by mouth daily for 7 days. predniSONE 10 mg tablet Commonly known as:  Marcelene Im Take 9 mg by mouth daily (with breakfast). Senna 8.6 mg tablet Generic drug:  senna Take 1 Tab by mouth daily as needed. VITAMIN D2 50,000 unit capsule Generic drug:  ergocalciferol Take 50,000 Units by mouth every seven (7) days. We Performed the Following ANEMIA PROFILE B [id number] CPT(R)] MAGNESIUM [id number] CPT(R)] METABOLIC PANEL, BASIC [05410 CPT(R)] PHOSPHORUS [17126 CPT(R)] VITAMIN D, 25 HYDROXY [id number] CPT(R)] To-Do List   
 11/06/2017 Imaging:  XR CHEST PA LAT Introducing [facility]! [employer] [facility] introduces [employer] patient portal. Now you can access parts of your medical record, email your doctor's office, and request medication refills online. 1. In your internet browser, go to [email or web address] 2. Click on the First Time User? Click Here link in the Sign In box. You will see the New Member Sign Up page. 3. Enter your [employer] Access Code exactly as it appears below. You will not need to use this code after youve completed the sign-up process. If you do not sign up before the expiration date, you must request a new code. · [employer] Access Code: [email or web address] Expires: 11/16/2017 10:22 AM 
 
4. Enter the last four digits of your Social Security Number (xxxx) and Date of Birth (mm/dd/yyyy) as indicated and click Submit. You will be taken to the next sign-up page. 5. Create a [employer] ID. This will be your [employer] login ID and cannot be changed, so think of one that is secure and easy to remember. 6. Create a [employer] password. You can change your password at any time. 7. Enter your Password Reset Question and Answer. This can be used at a later time if you forget your password. 8. Enter your e-mail address. You will receive e-mail notification when new information is available in [address] 9. Click Sign Up. You can now view and download portions of your medical record. 10. Click the Download Summary menu link to download a portable copy of your medical information. If you have questions, please visit the Frequently Asked Questions section of the [employer] website. Remember, [employer] is NOT to be used for urgent needs. For medical emergencies, dial 911. Now available from your iPhone and Android! Please provide this summary of care documentation to your next provider. Your primary care clinician is listed as Cal Padilla. If you have any questions after today's visit, please call [phone].

## 2017-11-07 LAB
25(OH)D3+25(OH)D2 SERPL-MCNC: 15 NG/ML (ref 30–100)
BASOPHILS # BLD AUTO: 0 X10E3/UL (ref 0–0.2)
BASOPHILS NFR BLD AUTO: 0 %
BUN SERPL-MCNC: 21 MG/DL (ref 6–20)
BUN/CREAT SERPL: 2 (ref 9–23)
CALCIUM SERPL-MCNC: 10.1 MG/DL (ref 8.7–10.2)
CHLORIDE SERPL-SCNC: 92 MMOL/L (ref 96–106)
CO2 SERPL-SCNC: 26 MMOL/L (ref 18–29)
CREAT SERPL-MCNC: 8.95 MG/DL (ref 0.57–1)
EOSINOPHIL # BLD AUTO: 0.1 X10E3/UL (ref 0–0.4)
EOSINOPHIL NFR BLD AUTO: 2 %
ERYTHROCYTE [DISTWIDTH] IN BLOOD BY AUTOMATED COUNT: 16.8 % (ref 12.3–15.4)
FERRITIN SERPL-MCNC: 385 NG/ML (ref 15–150)
FOLATE SERPL-MCNC: 3.9 NG/ML
GFR SERPLBLD CREATININE-BSD FMLA CKD-EPI: 5 ML/MIN/1.73
GFR SERPLBLD CREATININE-BSD FMLA CKD-EPI: 6 ML/MIN/1.73
GLUCOSE SERPL-MCNC: 87 MG/DL (ref 65–99)
HCT VFR BLD AUTO: 31 % (ref 34–46.6)
HGB BLD-MCNC: 10.5 G/DL (ref 11.1–15.9)
IMM GRANULOCYTES # BLD: 0 X10E3/UL (ref 0–0.1)
IMM GRANULOCYTES NFR BLD: 0 %
INTERPRETATION: NORMAL
IRON SATN MFR SERPL: 49 % (ref 15–55)
IRON SERPL-MCNC: 72 UG/DL (ref 27–159)
LYMPHOCYTES # BLD AUTO: 1.6 X10E3/UL (ref 0.7–3.1)
LYMPHOCYTES NFR BLD AUTO: 46 %
MAGNESIUM SERPL-MCNC: 2.2 MG/DL (ref 1.6–2.3)
MCH RBC QN AUTO: 30.4 PG (ref 26.6–33)
MCHC RBC AUTO-ENTMCNC: 33.9 G/DL (ref 31.5–35.7)
MCV RBC AUTO: 90 FL (ref 79–97)
MONOCYTES # BLD AUTO: 0.3 X10E3/UL (ref 0.1–0.9)
MONOCYTES NFR BLD AUTO: 9 %
NEUTROPHILS # BLD AUTO: 1.6 X10E3/UL (ref 1.4–7)
NEUTROPHILS NFR BLD AUTO: 43 %
PHOSPHATE SERPL-MCNC: 4.4 MG/DL (ref 2.5–4.5)
PLATELET # BLD AUTO: 246 X10E3/UL (ref 150–379)
POTASSIUM SERPL-SCNC: 3.8 MMOL/L (ref 3.5–5.2)
RBC # BLD AUTO: 3.45 X10E6/UL (ref 3.77–5.28)
RETICS/RBC NFR AUTO: 1.6 % (ref 0.6–2.6)
SODIUM SERPL-SCNC: 135 MMOL/L (ref 134–144)
TIBC SERPL-MCNC: 148 UG/DL (ref 250–450)
UIBC SERPL-MCNC: 76 UG/DL (ref 131–425)
VIT B12 SERPL-MCNC: >2000 PG/ML (ref 211–946)
WBC # BLD AUTO: 3.6 X10E3/UL (ref 3.4–10.8)

## 2017-11-08 ENCOUNTER — TELEPHONE (OUTPATIENT)
Dept: FAMILY MEDICINE CLINIC | Age: 32
End: 2017-11-08

## 2017-11-10 ENCOUNTER — OFFICE VISIT (OUTPATIENT)
Dept: NEUROLOGY | Age: 32
End: 2017-11-10

## 2017-11-10 VITALS
WEIGHT: 173 LBS | SYSTOLIC BLOOD PRESSURE: 158 MMHG | HEIGHT: 65 IN | DIASTOLIC BLOOD PRESSURE: 110 MMHG | BODY MASS INDEX: 28.82 KG/M2

## 2017-11-10 DIAGNOSIS — M54.50 CHRONIC BILATERAL LOW BACK PAIN WITHOUT SCIATICA: Primary | ICD-10-CM

## 2017-11-10 DIAGNOSIS — G89.29 CHRONIC BILATERAL LOW BACK PAIN WITHOUT SCIATICA: Primary | ICD-10-CM

## 2017-11-10 RX ORDER — OXYCODONE HYDROCHLORIDE 5 MG/1
TABLET ORAL
Qty: 40 TAB | Refills: 0 | Status: SHIPPED | OUTPATIENT
Start: 2017-11-10 | End: 2017-12-08 | Stop reason: SDUPTHER

## 2017-11-10 NOTE — MR AVS SNAPSHOT
Visit Information Date & Time Provider Department Dept. Phone Encounter #  
 11/10/2017 10:20 AM Trey Carver MD [facility] [phone] [id number] Follow-up Instructions Return in about 4 weeks (around 12/8/2017). Your Appointments 12/4/2017 10:30 AM  
ROUTINE CARE with Carlito Clarke  [facility] ([address]) Appt Note: 1 month follow up and re check chest xray [address] [id number]  
[phone]  
  
   
 [address] [address] Upcoming Health Maintenance Date Due DTaP/Tdap/Td series (1 - Tdap) 2/17/2007 Pneumococcal 19-64 Highest Risk (2 of 3 - PCV13) 10/1/2010 PAP AKA CERVICAL CYTOLOGY 4/13/2019 Allergies as of 11/10/2017  Review Complete On: 11/10/2017 By: Trey Carver MD  
  
 Severity Noted Reaction Type Reactions Compazine [Prochlorperazine Edisylate] High 07/11/2016   Systemic Nausea and Vomiting, Palpitations Fish Containing Products High 02/21/2017   Side Effect Rash An itchy rash appeared in about 1 hour Current Immunizations  Reviewed on 10/30/2017 Name Date Influenza Vaccine 9/12/2017, 9/9/2012 Influenza Vaccine Split 9/1/2011 ZZZ-RETIRED (DO NOT USE) Pneumococcal Vaccine (Unspecified Type) 10/1/2009 Not reviewed this visit You Were Diagnosed With   
  
 Codes Comments Chronic bilateral low back pain without sciatica    -  Primary ICD-10-CM: M54.5, G89.29 ICD-9-CM: 724.2, 338.29 Vitals BP Height(growth percentile) Weight(growth percentile) BMI OB Status Smoking Status (!) 168/118 (BP 1 Location: Left arm, BP Patient Position: Sitting) 5' 5\" (1.651 m) 173 lb (78.5 kg) 28.79 kg/m2 Medically Induced Never Smoker BMI and BSA Data Body Mass Index Body Surface Area 28.79 kg/m 2 1.9 m 2 Preferred Pharmacy Pharmacy Name Phone [address], [facility] [address] Your Updated Medication List  
  
   
This list is accurate as of: 11/10/17 10:53 AM.  Always use your most recent med list.  
  
  
  
  
 albuterol 90 mcg/actuation inhaler Commonly known as:  PROVENTIL HFA, VENTOLIN HFA, PROAIR HFA Take 1-2 Puffs by inhalation every four (4) hours as needed for Wheezing or Shortness of Breath. allopurinol 100 mg tablet Commonly known as:  Anastacio Cons Take 100 mg by mouth daily. amLODIPine 10 mg tablet Commonly known as:  Elspeth Bakes Take 1 Tab by mouth daily. amoxicillin-clavulanate 875-125 mg per tablet Commonly known as:  AUGMENTIN Take 1 Tab by mouth every twelve (12) hours. Indications: pneumonia  
  
 apixaban 5 mg tablet Commonly known as:  Rhenda Darrius Take 2 Tabs by mouth two (2) times a day. For 6 days and then 1 tab twice daily AURYXIA 210 mg iron tablet Generic drug:  ferric citrate TK 2 TS PO WITH MEALS  
  
 azaTHIOprine 50 mg tablet Commonly known as:  IMURAN  
  
 calcitRIOL 0.25 mcg capsule Commonly known as:  ROCALTROL Take 0.25 mcg by mouth daily. carvedilol 3.125 mg tablet Commonly known as:  COREG  
TK 1 T PO  BID  
  
 cyanocobalamin 2,500 mcg sublingual tablet Commonly known as:  VITAMIN B-12 Take 1 Tab by mouth daily. l.acidoph-B.lactis-B.longum 460 mg (7.5-6- 1.5 bill. cell) Cap cap Commonly known as:  DTNGYFHP6 Take 1 Cap by mouth Daily (before breakfast). magnesium oxide 400 mg tablet Commonly known as:  MAG-OX Take 1 Tab by mouth two (2) times a day for 30 days. metoprolol succinate 100 mg tablet Commonly known as:  TOPROL-XL Take 1 Tab by mouth daily. For blood pressure  
  
 ondansetron hcl 8 mg tablet Commonly known as:  Koby Offer Take 1 Tab by mouth every eight (8) hours as needed.  For Nausea  Indications: GASTROPARESIS  
  
 oxyCODONE IR 5 mg immediate release tablet Commonly known as:  Ever Ivans Take 1 tab in AM and HALF to 1 tab in PM if needed on days where back pain is severe  
  
 pantoprazole 40 mg tablet Commonly known as:  PROTONIX Take 1 Tab by mouth Daily (before breakfast). PLAQUENIL 200 mg tablet Generic drug:  hydroxychloroquine Take 200 mg by mouth two (2) times a day. predniSONE 10 mg tablet Commonly known as:  Oscar Sprawls Take 9 mg by mouth daily (with breakfast). Senna 8.6 mg tablet Generic drug:  senna Take 1 Tab by mouth daily as needed. VITAMIN D2 50,000 unit capsule Generic drug:  ergocalciferol Take 50,000 Units by mouth every seven (7) days. Prescriptions Printed Refills  
 oxyCODONE IR (ROXICODONE) 5 mg immediate release tablet 0 Sig: Take 1 tab in AM and HALF to 1 tab in PM if needed on days where back pain is severe 
   
 Class: Print We Performed the Following 13-DRUG SCREEN W/CONFIRM, WHOLE BLOOD [[id number] Custom] Follow-up Instructions Return in about 4 weeks (around 12/8/2017). Introducing [facility]! [employer] introduces [employer] patient portal. Now you can access parts of your medical record, email your doctor's office, and request medication refills online. 1. In your internet browser, go to [email or web address] 2. Click on the First Time User? Click Here link in the Sign In box. You will see the New Member Sign Up page. 3. Enter your [employer] Access Code exactly as it appears below. You will not need to use this code after youve completed the sign-up process. If you do not sign up before the expiration date, you must request a new code. · [employer] Access Code: [email or web address] Expires: 11/16/2017 10:22 AM 
 
4. Enter the last four digits of your Social Security Number (xxxx) and Date of Birth (mm/dd/yyyy) as indicated and click Submit.  You will be taken to the next sign-up page. 5. Create a eCert ID. This will be your eCert login ID and cannot be changed, so think of one that is secure and easy to remember. 6. Create a [employer]Cert password. You can change your password at any time. 7. Enter your Password Reset Question and Answer. This can be used at a later time if you forget your password. 8. Enter your e-mail address. You will receive e-mail notification when new information is available in [address]. 9. Click Sign Up. You can now view and download portions of your medical record. 10. Click the Download Summary menu link to download a portable copy of your medical information. If you have questions, please visit the Frequently Asked Questions section of the eCert website. Remember, eCert is NOT to be used for urgent needs. For medical emergencies, dial 911. Now available from your iPhone and Android! Please provide this summary of care documentation to your next provider. Your primary care clinician is listed as Reese Garcia. If you have any questions after today's visit, please call [phone].

## 2017-11-10 NOTE — PROGRESS NOTES
Pill count = 3 oxycodone 5 mg tabs       Pill count verified with patient. UDS obtained and sent =  Pain contract = on file   made available for MD review.

## 2017-11-10 NOTE — PROGRESS NOTES
Interval HPI:     This is a 33 y.o. female who is following up for     PMHx: chronic back pain, hx of fibromyalgia, hx of DVT/ coumadin, hx of Lupus, mild lower lumbar disc degeneration (L4-5 and L5-S1 with small annular tear at L5-S1)    Chief Complaint   Patient presents with    Pain (Chronic)     Was recently admitted twice for left-sided pneumonia. No changes in lower back pain. The generalized muscle aching she c/o last visit is better (does have fibromyalgia). Reports that she takes one Oxycodone IR 5 mg tab when she wakes up and maybe 2-3 days a week will take an extra tablet. Rates average daily pain as 3-5/ 10 with her medications, some days up to 8-9/ 10. Tried/ failed: Gabapentin, Cymbalta, Amitriptyline (only helped get to sleep), Lyrica (abnormal behaviors)    Reviewed : only filling pain Rx from my office, no aberrant behavior. Last filled Oxycodone on 10-18-17  Pill count: 3 Oxycodone IR 5 mg tablets left  UDS Hx:  March 2017: consistent with Rx pain medication  9-13-17 UDS: only screened for codeine or morphine, which pt doesn't take      Brief ROS: as above  There have been no significant changes in PMHx, PSHx, SHx except as noted above. Allergies   Allergen Reactions    Compazine [Prochlorperazine Edisylate] Nausea and Vomiting and Palpitations    Fish Containing Products Rash     An itchy rash appeared in about 1 hour     Current Outpatient Prescriptions   Medication Sig Dispense Refill    oxyCODONE IR (ROXICODONE) 5 mg immediate release tablet Take 1 tab in AM and HALF to 1 tab in PM if needed on days where back pain is severe    40 Tab 0    azaTHIOprine (IMURAN) 50 mg tablet       carvedilol (COREG) 3.125 mg tablet TK 1 T PO  BID  0    magnesium oxide (MAG-OX) 400 mg tablet Take 1 Tab by mouth two (2) times a day for 30 days. 60 Tab 0    amoxicillin-clavulanate (AUGMENTIN) 875-125 mg per tablet Take 1 Tab by mouth every twelve (12) hours.  Indications: pneumonia 14 Tab 0    albuterol (PROVENTIL HFA, VENTOLIN HFA, PROAIR HFA) 90 mcg/actuation inhaler Take 1-2 Puffs by inhalation every four (4) hours as needed for Wheezing or Shortness of Breath. 1 Inhaler 2    apixaban (ELIQUIS) 5 mg tablet Take 2 Tabs by mouth two (2) times a day. For 6 days and then 1 tab twice daily 70 Tab 2    l.acidoph-B.lactis-B.longum (FLORAJEN3) 460 mg (7.5-6- 1.5 bill. cell) cap cap Take 1 Cap by mouth Daily (before breakfast). 5 Cap 0    amLODIPine (NORVASC) 10 mg tablet Take 1 Tab by mouth daily. 30 Tab 5    metoprolol succinate (TOPROL-XL) 100 mg tablet Take 1 Tab by mouth daily. For blood pressure 30 Tab 5    AURYXIA 210 mg iron tablet TK 2 TS PO WITH MEALS  12    predniSONE (DELTASONE) 10 mg tablet Take 9 mg by mouth daily (with breakfast).  ergocalciferol (VITAMIN D2) 50,000 unit capsule Take 50,000 Units by mouth every seven (7) days.  hydroxychloroquine (PLAQUENIL) 200 mg tablet Take 200 mg by mouth two (2) times a day.  ondansetron hcl (ZOFRAN) 8 mg tablet Take 1 Tab by mouth every eight (8) hours as needed. For Nausea  Indications: GASTROPARESIS 15 Tab 1    allopurinol (ZYLOPRIM) 100 mg tablet Take 100 mg by mouth daily.  cyanocobalamin (VITAMIN B-12) 2,500 mcg sublingual tablet Take 1 Tab by mouth daily. 90 Tab 1    pantoprazole (PROTONIX) 40 mg tablet Take 1 Tab by mouth Daily (before breakfast). 30 Tab 0    calcitRIOL (ROCALTROL) 0.25 mcg capsule Take 0.25 mcg by mouth daily. 5    senna (SENNA) 8.6 mg tablet Take 1 Tab by mouth daily as needed.          Physical Exam  Vitals:    11/10/17 1028 11/10/17 1057 11/10/17 1107   BP: (!) 168/118 (!) 162/120 (!) 158/110   BP 1 Location: Left arm Left arm    BP Patient Position: Sitting Sitting    Weight: 78.5 kg (173 lb)     Height: 5' 5\" (1.651 m)           Resting, NAD, appears fatigued but more alert than last visit  Awake, alert and conversant     MSK:  Lumbar spine:   Mild tenderness across lower back   No pain with back flexion or standing upright  Mild worsening with back extension and rotation to left not right    Focused Neurological Exam     Mental status:   Alert and oriented to person, place situation  Mood appears stable  Normal thought processes    CNs: EOMI, Face symmetric, Hearing/ Language normal  Sensory: intact light touch in both legs  Motor: 4/ 5 strength in arms and legs    Reflexes: 1+ patellars  Gait: mildly antalgic    Impression    ICD-10-CM ICD-9-CM    1. Chronic bilateral low back pain without sciatica M54.5 724.2 13-DRUG SCREEN W/CONFIRM, WHOLE BLOOD    G89.29 338.29 oxyCODONE IR (ROXICODONE) 5 mg immediate release tablet        Renewed Rx Oxycodone IR 5 mg tablet (#40 tabs) to take 1 tab in AM and occasionally HALF to one tab in evenings if pain is severe. Pt says cannot provide a urine specimen today for UDS. Given lab slip to get drug screen (blood draw) [facility] at [facility] today. Follow up in 4 weeks. Initial BP was significantly elevated (DBP). Repeated it two more times and came down. Pt say DBP runs . Advised her to let her Nephrologist know (manages BP per pt).

## 2017-11-12 NOTE — PROGRESS NOTES
Labs show improvement in anemia. Kidney function unchanged - labs forwarded to nephrology. Vitamin D level was low. Continue vitamin D supplement as prescribed by nephrology.

## 2017-11-13 NOTE — PROGRESS NOTES
[phone] attempted to call patient no answer left message on her VM to call us back in regards to her labs   Faxed labs to Dr. Conchis Moore [phone]

## 2017-11-16 ENCOUNTER — TELEPHONE (OUTPATIENT)
Dept: RESPIRATORY THERAPY | Age: 32
End: 2017-11-16

## 2017-11-16 ENCOUNTER — PATIENT OUTREACH (OUTPATIENT)
Dept: FAMILY MEDICINE CLINIC | Age: 32
End: 2017-11-16

## 2017-11-16 NOTE — PROGRESS NOTES
Outbound call to patient discharged from [facility] on 10/30/17 for pneumonia. NN called to check on patient's status,  and do a follow-up visit. NN was able to reach and verify patient with 3 identifiers. Patient states she is doing much better, taking all medications as ordered, no fevers or cough at this time. Patient has followed up with appointment as scheduled. No ED or hospital visits listed since this admission. . Patient has NN contact information for questions and concerns. .  Current Outpatient Prescriptions   Medication Sig    oxyCODONE IR (ROXICODONE) 5 mg immediate release tablet Take 1 tab in AM and HALF to 1 tab in PM if needed on days where back pain is severe       azaTHIOprine (IMURAN) 50 mg tablet     carvedilol (COREG) 3.125 mg tablet TK 1 T PO  BID    magnesium oxide (MAG-OX) 400 mg tablet Take 1 Tab by mouth two (2) times a day for 30 days.  amoxicillin-clavulanate (AUGMENTIN) 875-125 mg per tablet Take 1 Tab by mouth every twelve (12) hours. Indications: pneumonia    albuterol (PROVENTIL HFA, VENTOLIN HFA, PROAIR HFA) 90 mcg/actuation inhaler Take 1-2 Puffs by inhalation every four (4) hours as needed for Wheezing or Shortness of Breath.  apixaban (ELIQUIS) 5 mg tablet Take 2 Tabs by mouth two (2) times a day. For 6 days and then 1 tab twice daily    l.acidoph-B.lactis-B.longum (FLORAJEN3) 460 mg (7.5-6- 1.5 bill. cell) cap cap Take 1 Cap by mouth Daily (before breakfast).  amLODIPine (NORVASC) 10 mg tablet Take 1 Tab by mouth daily.  metoprolol succinate (TOPROL-XL) 100 mg tablet Take 1 Tab by mouth daily. For blood pressure    AURYXIA 210 mg iron tablet TK 2 TS PO WITH MEALS    predniSONE (DELTASONE) 10 mg tablet Take 9 mg by mouth daily (with breakfast).  ergocalciferol (VITAMIN D2) 50,000 unit capsule Take 50,000 Units by mouth every seven (7) days.  hydroxychloroquine (PLAQUENIL) 200 mg tablet Take 200 mg by mouth two (2) times a day.     ondansetron hcl (ZOFRAN) 8 mg tablet Take 1 Tab by mouth every eight (8) hours as needed. For Nausea  Indications: GASTROPARESIS    allopurinol (ZYLOPRIM) 100 mg tablet Take 100 mg by mouth daily.  cyanocobalamin (VITAMIN B-12) 2,500 mcg sublingual tablet Take 1 Tab by mouth daily.  pantoprazole (PROTONIX) 40 mg tablet Take 1 Tab by mouth Daily (before breakfast).  calcitRIOL (ROCALTROL) 0.25 mcg capsule Take 0.25 mcg by mouth daily.  senna (SENNA) 8.6 mg tablet Take 1 Tab by mouth daily as needed. No current facility-administered medications for this visit. .  Goals        Patient [facility] (pt-stated)            10/19/17- NN did not discuss this with patient. Will plan to discuss with patient during upcoming call. sc       Effective airway clearance r/t to pneumonia (pt-stated)            10/19/17- patient admitted to [facility] 10/14/17-10/17/17 for pneumonia. Patient will assess respirations ie shortness of breath. Patient will cough to promote removal of secretions to improve breathing. Patient will increase ambulation to mobilize secretions. sc   10/31/17-       Prevention of infection (pt-stated)            10/19/17- patient will assess/closely monitor her temperatures  and report fevers greater than 101. Patient will assess hydration status by consuming at least six cups of water to avoid dehydration. Patient will take Levaquin as prescribed. sc  10/31/17-  instructed patient on importance of taking all medications as ordered, follow-ups as scheduled- PK    Call your doctor now or seek immediate medical care if:  ? · You cough up dark brown or bloody mucus (sputum). ? · You have new or worse trouble breathing. ? · You are dizzy or lightheaded, or you feel like you may faint. ? Watch closely for changes in your health, and be sure to contact your doctor if:  ? · You have a new or higher fever. ? · You are coughing more deeply or more often.    ? · You are not getting better after 2 days (48 hours). ? · You do not get better as expected. Case management Plan:  NN will continue to attempt contacts with patient by telephone or during office visit within next 7-10 days. Will continue to follow as necessary for the remaining 30 days post hospital visit and will reassess to see if CCM assessment is needed before discharge.

## 2017-11-26 ENCOUNTER — HOSPITAL ENCOUNTER (INPATIENT)
Age: 32
LOS: 2 days | Discharge: HOME | DRG: 699 | End: 2017-11-28
Attending: EMERGENCY MEDICINE | Admitting: INTERNAL MEDICINE
Payer: MEDICARE

## 2017-11-26 ENCOUNTER — APPOINTMENT (OUTPATIENT)
Dept: GENERAL RADIOLOGY | Age: 32
DRG: 699 | End: 2017-11-26
Attending: EMERGENCY MEDICINE
Payer: MEDICARE

## 2017-11-26 DIAGNOSIS — R60.1 ANASARCA: Primary | ICD-10-CM

## 2017-11-26 DIAGNOSIS — E87.6 HYPOKALEMIA: ICD-10-CM

## 2017-11-26 LAB
ALBUMIN SERPL-MCNC: 1.5 G/DL (ref 3.5–5)
ALBUMIN SERPL-MCNC: 1.5 G/DL (ref 3.5–5)
ALBUMIN/GLOB SERPL: 0.4 {RATIO} (ref 1.1–2.2)
ALP SERPL-CCNC: 66 U/L (ref 45–117)
ALT SERPL-CCNC: 20 U/L (ref 12–78)
ANION GAP SERPL CALC-SCNC: 9 MMOL/L (ref 5–15)
AST SERPL-CCNC: 43 U/L (ref 15–37)
BASOPHILS # BLD: 0 K/UL (ref 0–0.1)
BASOPHILS NFR BLD: 1 % (ref 0–1)
BILIRUB SERPL-MCNC: 0.4 MG/DL (ref 0.2–1)
BNP SERPL-MCNC: ABNORMAL PG/ML (ref 0–125)
BUN SERPL-MCNC: 33 MG/DL (ref 6–20)
BUN/CREAT SERPL: 4 (ref 12–20)
CALCIUM SERPL-MCNC: 7.2 MG/DL (ref 8.5–10.1)
CHLORIDE SERPL-SCNC: 101 MMOL/L (ref 97–108)
CO2 SERPL-SCNC: 29 MMOL/L (ref 21–32)
CREAT SERPL-MCNC: 8.4 MG/DL (ref 0.55–1.02)
EOSINOPHIL # BLD: 0 K/UL (ref 0–0.4)
EOSINOPHIL NFR BLD: 0 % (ref 0–7)
ERYTHROCYTE [DISTWIDTH] IN BLOOD BY AUTOMATED COUNT: 17.5 % (ref 11.5–14.5)
GLOBULIN SER CALC-MCNC: 4 G/DL (ref 2–4)
GLUCOSE SERPL-MCNC: 84 MG/DL (ref 65–100)
HCT VFR BLD AUTO: 31.3 % (ref 35–47)
HGB BLD-MCNC: 10.3 G/DL (ref 11.5–16)
LACTATE SERPL-SCNC: 2.9 MMOL/L (ref 0.4–2)
LYMPHOCYTES # BLD: 1.4 K/UL (ref 0.8–3.5)
LYMPHOCYTES NFR BLD: 35 % (ref 12–49)
MAGNESIUM SERPL-MCNC: 1.6 MG/DL (ref 1.6–2.4)
MCH RBC QN AUTO: 28.5 PG (ref 26–34)
MCHC RBC AUTO-ENTMCNC: 32.9 G/DL (ref 30–36.5)
MCV RBC AUTO: 86.5 FL (ref 80–99)
MONOCYTES # BLD: 0.4 K/UL (ref 0–1)
MONOCYTES NFR BLD: 9 % (ref 5–13)
NEUTS SEG # BLD: 2.3 K/UL (ref 1.8–8)
NEUTS SEG NFR BLD: 55 % (ref 32–75)
PLATELET # BLD AUTO: 258 K/UL (ref 150–400)
POTASSIUM SERPL-SCNC: 2.3 MMOL/L (ref 3.5–5.1)
PROT SERPL-MCNC: 5.5 G/DL (ref 6.4–8.2)
RBC # BLD AUTO: 3.62 M/UL (ref 3.8–5.2)
SODIUM SERPL-SCNC: 139 MMOL/L (ref 136–145)
TROPONIN I SERPL-MCNC: 0.05 NG/ML
WBC # BLD AUTO: 4.1 K/UL (ref 3.6–11)

## 2017-11-26 PROCEDURE — 84484 ASSAY OF TROPONIN QUANT: CPT | Performed by: EMERGENCY MEDICINE

## 2017-11-26 PROCEDURE — 96365 THER/PROPH/DIAG IV INF INIT: CPT

## 2017-11-26 PROCEDURE — 83880 ASSAY OF NATRIURETIC PEPTIDE: CPT | Performed by: EMERGENCY MEDICINE

## 2017-11-26 PROCEDURE — 65660000000 HC RM CCU STEPDOWN

## 2017-11-26 PROCEDURE — 83605 ASSAY OF LACTIC ACID: CPT | Performed by: EMERGENCY MEDICINE

## 2017-11-26 PROCEDURE — 74011250637 HC RX REV CODE- 250/637: Performed by: EMERGENCY MEDICINE

## 2017-11-26 PROCEDURE — 36415 COLL VENOUS BLD VENIPUNCTURE: CPT | Performed by: EMERGENCY MEDICINE

## 2017-11-26 PROCEDURE — 87040 BLOOD CULTURE FOR BACTERIA: CPT | Performed by: EMERGENCY MEDICINE

## 2017-11-26 PROCEDURE — 82040 ASSAY OF SERUM ALBUMIN: CPT | Performed by: EMERGENCY MEDICINE

## 2017-11-26 PROCEDURE — 93005 ELECTROCARDIOGRAM TRACING: CPT

## 2017-11-26 PROCEDURE — 74011250636 HC RX REV CODE- 250/636: Performed by: EMERGENCY MEDICINE

## 2017-11-26 PROCEDURE — 83735 ASSAY OF MAGNESIUM: CPT | Performed by: INTERNAL MEDICINE

## 2017-11-26 PROCEDURE — 80053 COMPREHEN METABOLIC PANEL: CPT | Performed by: EMERGENCY MEDICINE

## 2017-11-26 PROCEDURE — 71020 XR CHEST PA LAT: CPT

## 2017-11-26 PROCEDURE — 99284 EMERGENCY DEPT VISIT MOD MDM: CPT

## 2017-11-26 PROCEDURE — 85025 COMPLETE CBC W/AUTO DIFF WBC: CPT | Performed by: EMERGENCY MEDICINE

## 2017-11-26 RX ORDER — POTASSIUM CHLORIDE 750 MG/1
40 TABLET, FILM COATED, EXTENDED RELEASE ORAL
Status: COMPLETED | OUTPATIENT
Start: 2017-11-26 | End: 2017-11-26

## 2017-11-26 RX ORDER — SODIUM CHLORIDE 0.9 % (FLUSH) 0.9 %
5-10 SYRINGE (ML) INJECTION AS NEEDED
Status: DISCONTINUED | OUTPATIENT
Start: 2017-11-26 | End: 2017-11-28 | Stop reason: HOSPADM

## 2017-11-26 RX ORDER — ONDANSETRON 2 MG/ML
4 INJECTION INTRAMUSCULAR; INTRAVENOUS
Status: DISCONTINUED | OUTPATIENT
Start: 2017-11-26 | End: 2017-11-28 | Stop reason: HOSPADM

## 2017-11-26 RX ORDER — SODIUM CHLORIDE 0.9 % (FLUSH) 0.9 %
5-10 SYRINGE (ML) INJECTION EVERY 8 HOURS
Status: DISCONTINUED | OUTPATIENT
Start: 2017-11-26 | End: 2017-11-28 | Stop reason: HOSPADM

## 2017-11-26 RX ORDER — METOPROLOL SUCCINATE 50 MG/1
100 TABLET, EXTENDED RELEASE ORAL DAILY
Status: CANCELLED | OUTPATIENT
Start: 2017-11-27

## 2017-11-26 RX ORDER — POTASSIUM CHLORIDE 7.45 MG/ML
10 INJECTION INTRAVENOUS
Status: COMPLETED | OUTPATIENT
Start: 2017-11-26 | End: 2017-11-28

## 2017-11-26 RX ADMIN — POTASSIUM CHLORIDE 10 MEQ: 10 INJECTION, SOLUTION INTRAVENOUS at 23:46

## 2017-11-26 RX ADMIN — POTASSIUM CHLORIDE 40 MEQ: 750 TABLET, FILM COATED, EXTENDED RELEASE ORAL at 22:17

## 2017-11-26 RX ADMIN — POTASSIUM CHLORIDE 10 MEQ: 10 INJECTION, SOLUTION INTRAVENOUS at 22:20

## 2017-11-26 NOTE — IP AVS SNAPSHOT
[address] [facility] [address] 
[phone] Patient: Marycruz Molina MRN: [id number] :1985 About your hospitalization You were admitted on:  2017 You last received care in the:  [facility] 6S NEURO-SCI TELE You were discharged on:  2017 Why you were hospitalized Your primary diagnosis was:  Hypokalemia Things You Need To Do (next 8 weeks) Follow up with Dirk Contreras NP in 2 week(s) Phone:  [phone] Where:  [address] [phone] Follow up with Royal Valenzuela MD in 3 day(s) Phone:  [phone] Where:  [address], [address], [address] [address] Monday Dec 04, 2017 ROUTINE CARE with Dirk Contreras NP at 10:30 AM  
Where:  [address] [facility]) Friday Dec 08, 2017 Follow Up with Timur Flores MD at 10:20 AM  
Where:  [facility]) Discharge Orders None A check fahad indicates which time of day the medication should be taken. My Medications STOP taking these medications   
 amoxicillin-clavulanate 875-125 mg per tablet Commonly known as:  AUGMENTIN  
   
  
 calcitRIOL 0.25 mcg capsule Commonly known as:  ROCALTROL  
   
  
  
TAKE these medications as instructed Instructions Each Dose to Equal  
 Morning Noon Evening Bedtime  
 albuterol 90 mcg/actuation inhaler Commonly known as:  PROVENTIL HFA, VENTOLIN HFA, PROAIR HFA Take 1-2 Puffs by inhalation every four (4) hours as needed for Wheezing or Shortness of Breath. 1-2 Puff  
    
   
   
   
  
 allopurinol 100 mg tablet Commonly known as:  Ruby Cormier Your last dose was:  17 0842 am  
Your next dose is:  17 0800 am  
   
 Take 100 mg by mouth daily.   
 100 mg  
    
  
   
   
   
  
 amLODIPine 10 mg tablet Commonly known as:  Tahira Salgado Your last dose was:  11/28/17 0800 am  
Your next dose is:  11/29/17 0800 am  
   
 Take 1 Tab by mouth daily. 10 mg  
    
  
   
   
   
  
 apixaban 5 mg tablet Commonly known as:  Lisa Haver Your last dose was:  11/28/17 0800 am  
Your next dose is:  11/29/17 0800 am  
   
 Take 5 mg by mouth two (2) times a day. 5 mg AURYXIA 210 mg iron tablet Generic drug:  ferric citrate TK 2 TS PO WITH MEALS  
     
   
   
   
  
 azaTHIOprine 50 mg tablet Commonly known as:  The [employer] Your last dose was:  11/28/17 0800 Your next dose is:  11/29/17 0800 COREG 6.25 mg tablet Generic drug:  carvedilol Take 6.25 mg by mouth two (2) times daily (with meals). 6.25 mg  
    
   
   
   
  
 cyanocobalamin 2,500 mcg sublingual tablet Commonly known as:  VITAMIN B-12 Take 1 Tab by mouth daily. 2500 mcg  
    
   
   
   
  
 l.acidoph-B.lactis-B.longum 460 mg (7.5-6- 1.5 bill. cell) Cap cap Commonly known as:  FEOIYEUV6 Your last dose was:  11/28/17 727 am  
Your next dose is:  11/29/17 0700 am  
   
 Take 1 Cap by mouth Daily (before breakfast). 1 Cap  
    
  
   
   
   
  
 magnesium oxide 400 mg tablet Commonly known as:  MAG-OX Your last dose was:  11/28/17 0800 Your next dose is:  11/29/17 0800 Take 1 Tab by mouth two (2) times a day for 30 days. 400 mg  
    
  
   
   
   
  
 ondansetron hcl 8 mg tablet Commonly known as:  Rossana Aguilar Take 1 Tab by mouth every eight (8) hours as needed. For Nausea  Indications: GASTROPARESIS  
 8 mg  
    
   
   
   
  
 oxyCODONE IR 5 mg immediate release tablet Commonly known as:  Rosibel Cordova Your last dose was:  11/27/17 1023 pm  
   
 Take 1 tab in AM and HALF to 1 tab in PM if needed on days where back pain is severe  
     
   
   
   
  
 pantoprazole 40 mg tablet Commonly known as:  PROTONIX Your last dose was:  11/28/17 0700 Your next dose is:  11/29/17 0700 Take 1 Tab by mouth Daily (before breakfast). 40 mg  
    
  
   
   
   
  
 PLAQUENIL 200 mg tablet Generic drug:  hydroxychloroquine Your last dose was:  11/28/17 0800 Your next dose is:  11/28/17 600 pm  
   
 Take 200 mg by mouth two (2) times a day. 200 mg  
    
  
   
   
  
   
  
 potassium chloride SR 20 mEq tablet Commonly known as:  K-TAB Take 2 Tabs by mouth daily for 15 days. 40 mEq  
    
   
   
   
  
 predniSONE 10 mg tablet Commonly known as:  Therman Rail Your last dose was:  11/28/17 0800 Your next dose is:  11/29/17 0800 Take 9 mg by mouth daily (with breakfast). 9 mg Senna 8.6 mg tablet Generic drug:  senna Your last dose was:  11/28/17 0800 Your next dose is:  11/29/17 0800 Take 1 Tab by mouth daily as needed. 1 Tab VITAMIN D2 50,000 unit capsule Generic drug:  ergocalciferol Take 50,000 Units by mouth every seven (7) days. 18046 Units Where to Get Your Medications Information on where to get these meds will be given to you by the nurse or doctor. ! Ask your nurse or doctor about these medications  
  potassium chloride SR 20 mEq tablet Discharge Instructions Discharge Instructions PATIENT ID: Nadiya Brandon MRN: [id number] YOB: 1985 DATE OF ADMISSION: 11/26/2017  8:07 PM   
DATE OF DISCHARGE: 11/28/2017 PRIMARY CARE PROVIDER: Mark Higuera NP  
 
ATTENDING PHYSICIAN: Cirilo Coley MD 
DISCHARGING PROVIDER: Cirilo Coley MD   
To contact this individual call [phone] and ask the  to page. If unavailable ask to be transferred the Adult Hospitalist Department. DISCHARGE DIAGNOSES Hypokalemia and issue with PD  
 
CONSULTATIONS: IP CONSULT TO NEPHROLOGY 
IP CONSULT TO NEPHROLOGY IP CONSULT TO HOSPITALIST 
 
PROCEDURES/SURGERIES: * No surgery found * PENDING TEST RESULTS:  
At the time of discharge the following test results are still pending: FOLLOW UP APPOINTMENTS:  
Follow-up Information Follow up With Details Comments Contact Info Sonia Orellana NP In 2 weeks  [address] [place] [address] 
[phone] Starr Nuno MD In 3 days  Emily Kirk  
[address] [facility] 
[phone] ADDITIONAL CARE RECOMMENDATIONS:  
 
DIET: Renal Diet ACTIVITY: Activity as tolerated WOUND CARE:  
 
EQUIPMENT needed:  
 
 
  
 SNF/Inpatient Rehab/LTAC Independent/assisted living Hospice Other: CDMP Checked:  
Yes x PROBLEM LIST Updated: 
Yes x Signed:  
Juliette Monsivais MD 
11/28/2017 
12:28 PM 
 
ACO Transitions of Care Introducing [employer] [address] Ekaterina Krishnamurthy offers a voluntary care coordination program to provide high quality service and care to [facility] fee-for-service beneficiaries. Sergio Chow was designed to help you enhance your health and well-being through the following services: ? Transitions of Care  support for individuals who are transitioning from one care setting to another (example: Hospital to home). ? Chronic and Complex Care Coordination  support for individuals and caregivers of those with serious or chronic illnesses or with more than one chronic (ongoing) condition and those who take a number of different medications. If you meet specific medical criteria, a [facility] may call you directly to coordinate your care with your primary care physician and your other care providers. For questions about the [facility] programs, please, contact your physicians office. For general questions or additional information about Accountable Care Organizations: 
Please visit www.medicare.gov/acos. html or call 1-800-MEDICARE (9-783.327.2592) TTY users should call 7-780.403.1884. [employer] Announcement We are excited to announce that we are making your provider's discharge notes available to you in Celt[employer]. You will see these notes when they are completed and signed by the physician that discharged you from your recent hospital stay. If you have any questions or concerns about any information you see in Celt[employer], please call the Health Information Department where you were seen or reach out to your Primary Care Provider for more information about your plan of care. Introducing [address]! Alexys Aguilar introduces [employer] patient portal. Now you can access parts of your medical record, email your doctor's office, and request medication refills online. 1. In your internet browser, go to [email or web address] 2. Click on the First Time User? Click Here link in the Sign In box. You will see the New Member Sign Up page. 3. Enter your [employer] Access Code exactly as it appears below. You will not need to use this code after youve completed the sign-up process.  If you do not sign up before the expiration date, you must request a new code. · [employer] Access Code: [email or web address] Expires: 2/26/2018 12:44 PM 
 
4. Enter the last four digits of your Social Security Number (xxxx) and Date of Birth (mm/dd/yyyy) as indicated and click Submit. You will be taken to the next sign-up page. 5. Create a W[employer]OP ID. This will be your [employer] login ID and cannot be changed, so think of one that is secure and easy to remember. 6. Create a [employer] password. You can change your password at any time. 7. Enter your Password Reset Question and Answer. This can be used at a later time if you forget your password. 8. Enter your e-mail address. You will receive e-mail notification when new information is available in [address] 9. Click Sign Up. You can now view and download portions of your medical record. 10. Click the Download Summary menu link to download a portable copy of your medical information. If you have questions, please visit the Frequently Asked Questions section of the [employer] website. Remember, [employer] is NOT to be used for urgent needs. For medical emergencies, dial 911. Now available from your iPhone and Android! Unresulted Labs-Please follow up with your PCP about these lab tests Order Current Status CULTURE, BLOOD, PAIRED Preliminary result CULTURE, BODY FLUID W GRAM STAIN Preliminary result Providers Seen During Your Hospitalization Provider Specialty Primary office phone Candido Presley MD Emergency Medicine [phone] Jesus Ashton MD Internal Medicine [phone] Yuri Kwon MD Internal Medicine [phone] Garrison Peres MD Internal Medicine [phone] Your Primary Care Physician (PCP) Primary Care Physician Office Phone Office Fax Ingris Abreu [phone] You are allergic to the following Allergen Reactions Compazine (Prochlorperazine Edisylate) Nausea and Vomiting Palpitations Fish Containing Products Rash An itchy rash appeared in about 1 hour Recent Documentation Height Weight BMI OB Status Smoking Status 1.651 m 80.3 kg 29.45 kg/m2 Medically Induced Never Smoker Emergency Contacts Name Discharge Info Relation Home Work Mobile Louis Sparks CAREGIVER [3] Parent [1] [phone] Patient Belongings The following personal items are in your possession at time of discharge: 
  Dental Appliances: None  Visual Aid: None      Home Medications: None   Jewelry: None  Clothing: At bedside, Shirt, Pants, Footwear, Undergarments    Other Valuables: Cell Phone, Purse, At bedside Please provide this summary of care documentation to your next provider. Signatures-by signing, you are acknowledging that this After Visit Summary has been reviewed with you and you have received a copy. Patient Signature:  ____________________________________________________________ Date:  ____________________________________________________________  
  
Felecia Barrera Provider Signature:  ____________________________________________________________ Date:  ____________________________________________________________

## 2017-11-26 NOTE — IP AVS SNAPSHOT
[address] 
[phone] Patient: Matias Anders MRN: [id number] :1985 My Medications STOP taking these medications   
 amoxicillin-clavulanate 875-125 mg per tablet Commonly known as:  AUGMENTIN  
   
  
 calcitRIOL 0.25 mcg capsule Commonly known as:  ROCALTROL  
   
  
  
TAKE these medications as instructed Instructions Each Dose to Equal  
 Morning Noon Evening Bedtime  
 albuterol 90 mcg/actuation inhaler Commonly known as:  PROVENTIL HFA, VENTOLIN HFA, PROAIR HFA Take 1-2 Puffs by inhalation every four (4) hours as needed for Wheezing or Shortness of Breath. 1-2 Puff  
    
   
   
   
  
 allopurinol 100 mg tablet Commonly known as:  Isadora Green Your last dose was:  17 0842 am  
Your next dose is:  17 0800 am  
   
 Take 100 mg by mouth daily. 100 mg  
    
  
   
   
   
  
 amLODIPine 10 mg tablet Commonly known as:  Elizabeth Telles Your last dose was:  17 0800 am  
Your next dose is:  17 0800 am  
   
 Take 1 Tab by mouth daily. 10 mg  
    
  
   
   
   
  
 apixaban 5 mg tablet Commonly known as:  Lucio Kaufman Your last dose was:  17 0800 am  
Your next dose is:  17 0800 am  
   
 Take 5 mg by mouth two (2) times a day. 5 mg AURYXIA 210 mg iron tablet Generic drug:  ferric citrate TK 2 TS PO WITH MEALS  
     
   
   
   
  
 azaTHIOprine 50 mg tablet Commonly known as:  The Pepsi Your last dose was:  17 0800 Your next dose is:  17 0800 COREG 6.25 mg tablet Generic drug:  carvedilol Take 6.25 mg by mouth two (2) times daily (with meals). 6.25 mg  
    
   
   
   
  
 cyanocobalamin 2,500 mcg sublingual tablet Commonly known as:  VITAMIN B-12 Take 1 Tab by mouth daily. 2500 mcg l.acidoph-B.lactis-B.longum 460 mg (7.5-6- 1.5 bill. cell) Cap cap Commonly known as:  JBPCAUER1 Your last dose was:  11/28/17 727 am  
Your next dose is:  11/29/17 0700 am  
   
 Take 1 Cap by mouth Daily (before breakfast). 1 Cap  
    
  
   
   
   
  
 magnesium oxide 400 mg tablet Commonly known as:  MAG-OX Your last dose was:  11/28/17 0800 Your next dose is:  11/29/17 0800 Take 1 Tab by mouth two (2) times a day for 30 days. 400 mg  
    
  
   
   
   
  
 ondansetron hcl 8 mg tablet Commonly known as:  Mee Scriver Take 1 Tab by mouth every eight (8) hours as needed. For Nausea  Indications: GASTROPARESIS  
 8 mg  
    
   
   
   
  
 oxyCODONE IR 5 mg immediate release tablet Commonly known as:  Dondrdee Som Your last dose was:  11/27/17 1023 pm  
   
 Take 1 tab in AM and HALF to 1 tab in PM if needed on days where back pain is severe  
     
   
   
   
  
 pantoprazole 40 mg tablet Commonly known as:  PROTONIX Your last dose was:  11/28/17 0700 Your next dose is:  11/29/17 0700 Take 1 Tab by mouth Daily (before breakfast). 40 mg  
    
  
   
   
   
  
 PLAQUENIL 200 mg tablet Generic drug:  hydroxychloroquine Your last dose was:  11/28/17 0800 Your next dose is:  11/28/17 600 pm  
   
 Take 200 mg by mouth two (2) times a day. 200 mg  
    
  
   
   
  
   
  
 potassium chloride SR 20 mEq tablet Commonly known as:  K-TAB Take 2 Tabs by mouth daily for 15 days. 40 mEq  
    
   
   
   
  
 predniSONE 10 mg tablet Commonly known as:  Jonetta Moment Your last dose was:  11/28/17 0800 Your next dose is:  11/29/17 0800 Take 9 mg by mouth daily (with breakfast). 9 mg Senna 8.6 mg tablet Generic drug:  senna Your last dose was:  11/28/17 0800 Your next dose is:  11/29/17 0800 Take 1 Tab by mouth daily as needed. 1 Tab VITAMIN D2 50,000 unit capsule Generic drug:  ergocalciferol Take 50,000 Units by mouth every seven (7) days. 14070 Units Where to Get Your Medications Information on where to get these meds will be given to you by the nurse or doctor. ! Ask your nurse or doctor about these medications  
  potassium chloride SR 20 mEq tablet

## 2017-11-27 LAB
ALBUMIN SERPL-MCNC: 1.2 G/DL (ref 3.5–5)
ALBUMIN/GLOB SERPL: 0.4 {RATIO} (ref 1.1–2.2)
ALP SERPL-CCNC: 58 U/L (ref 45–117)
ALT SERPL-CCNC: 18 U/L (ref 12–78)
ANION GAP SERPL CALC-SCNC: 8 MMOL/L (ref 5–15)
AST SERPL-CCNC: 47 U/L (ref 15–37)
ATRIAL RATE: 108 BPM
BILIRUB DIRECT SERPL-MCNC: 0.1 MG/DL (ref 0–0.2)
BILIRUB SERPL-MCNC: 0.3 MG/DL (ref 0.2–1)
BUN SERPL-MCNC: 32 MG/DL (ref 6–20)
BUN/CREAT SERPL: 4 (ref 12–20)
CALCIUM SERPL-MCNC: 6.1 MG/DL (ref 8.5–10.1)
CALCULATED P AXIS, ECG09: 33 DEGREES
CALCULATED R AXIS, ECG10: 36 DEGREES
CALCULATED T AXIS, ECG11: 60 DEGREES
CHLORIDE SERPL-SCNC: 105 MMOL/L (ref 97–108)
CO2 SERPL-SCNC: 26 MMOL/L (ref 21–32)
CREAT SERPL-MCNC: 8.19 MG/DL (ref 0.55–1.02)
DIAGNOSIS, 93000: NORMAL
GLOBULIN SER CALC-MCNC: 3.2 G/DL (ref 2–4)
GLUCOSE SERPL-MCNC: 70 MG/DL (ref 65–100)
LACTATE SERPL-SCNC: 2.4 MMOL/L (ref 0.4–2)
LACTATE SERPL-SCNC: 3.6 MMOL/L (ref 0.4–2)
MAGNESIUM SERPL-MCNC: 1.5 MG/DL (ref 1.6–2.4)
P-R INTERVAL, ECG05: 110 MS
POTASSIUM SERPL-SCNC: 3 MMOL/L (ref 3.5–5.1)
POTASSIUM SERPL-SCNC: 3.2 MMOL/L (ref 3.5–5.1)
PROT SERPL-MCNC: 4.4 G/DL (ref 6.4–8.2)
Q-T INTERVAL, ECG07: 360 MS
QRS DURATION, ECG06: 76 MS
QTC CALCULATION (BEZET), ECG08: 482 MS
SODIUM SERPL-SCNC: 139 MMOL/L (ref 136–145)
TROPONIN I SERPL-MCNC: 0.05 NG/ML
TROPONIN I SERPL-MCNC: 0.05 NG/ML
VENTRICULAR RATE, ECG03: 108 BPM

## 2017-11-27 PROCEDURE — A4722 DIALYS SOL FLD VOL > 1999CC: HCPCS | Performed by: INTERNAL MEDICINE

## 2017-11-27 PROCEDURE — 74011250637 HC RX REV CODE- 250/637: Performed by: INTERNAL MEDICINE

## 2017-11-27 PROCEDURE — 80048 BASIC METABOLIC PNL TOTAL CA: CPT | Performed by: INTERNAL MEDICINE

## 2017-11-27 PROCEDURE — 74011250636 HC RX REV CODE- 250/636: Performed by: EMERGENCY MEDICINE

## 2017-11-27 PROCEDURE — 83605 ASSAY OF LACTIC ACID: CPT | Performed by: INTERNAL MEDICINE

## 2017-11-27 PROCEDURE — 87205 SMEAR GRAM STAIN: CPT | Performed by: INTERNAL MEDICINE

## 2017-11-27 PROCEDURE — 36415 COLL VENOUS BLD VENIPUNCTURE: CPT | Performed by: INTERNAL MEDICINE

## 2017-11-27 PROCEDURE — 74011250636 HC RX REV CODE- 250/636: Performed by: INTERNAL MEDICINE

## 2017-11-27 PROCEDURE — 65660000000 HC RM CCU STEPDOWN

## 2017-11-27 PROCEDURE — 74011636637 HC RX REV CODE- 636/637: Performed by: INTERNAL MEDICINE

## 2017-11-27 PROCEDURE — 80076 HEPATIC FUNCTION PANEL: CPT | Performed by: INTERNAL MEDICINE

## 2017-11-27 PROCEDURE — 84484 ASSAY OF TROPONIN QUANT: CPT | Performed by: INTERNAL MEDICINE

## 2017-11-27 PROCEDURE — 87106 FUNGI IDENTIFICATION YEAST: CPT | Performed by: INTERNAL MEDICINE

## 2017-11-27 PROCEDURE — 84132 ASSAY OF SERUM POTASSIUM: CPT | Performed by: INTERNAL MEDICINE

## 2017-11-27 PROCEDURE — 83735 ASSAY OF MAGNESIUM: CPT | Performed by: INTERNAL MEDICINE

## 2017-11-27 RX ORDER — POTASSIUM CHLORIDE 750 MG/1
40 TABLET, FILM COATED, EXTENDED RELEASE ORAL
Status: COMPLETED | OUTPATIENT
Start: 2017-11-27 | End: 2017-11-27

## 2017-11-27 RX ORDER — POTASSIUM CHLORIDE 750 MG/1
40 TABLET, FILM COATED, EXTENDED RELEASE ORAL
Status: ACTIVE | OUTPATIENT
Start: 2017-11-27 | End: 2017-11-27

## 2017-11-27 RX ORDER — ERGOCALCIFEROL 1.25 MG/1
50000 CAPSULE ORAL
Status: DISCONTINUED | OUTPATIENT
Start: 2017-11-30 | End: 2017-11-28 | Stop reason: HOSPADM

## 2017-11-27 RX ORDER — AMOXICILLIN AND CLAVULANATE POTASSIUM 875; 125 MG/1; MG/1
1 TABLET, FILM COATED ORAL EVERY 12 HOURS
Status: DISCONTINUED | OUTPATIENT
Start: 2017-11-27 | End: 2017-11-27

## 2017-11-27 RX ORDER — AMLODIPINE BESYLATE 5 MG/1
10 TABLET ORAL DAILY
Status: DISCONTINUED | OUTPATIENT
Start: 2017-11-27 | End: 2017-11-28 | Stop reason: HOSPADM

## 2017-11-27 RX ORDER — HYDROXYCHLOROQUINE SULFATE 200 MG/1
200 TABLET, FILM COATED ORAL 2 TIMES DAILY
Status: DISCONTINUED | OUTPATIENT
Start: 2017-11-27 | End: 2017-11-28 | Stop reason: HOSPADM

## 2017-11-27 RX ORDER — ALBUTEROL SULFATE 0.83 MG/ML
5 SOLUTION RESPIRATORY (INHALATION)
Status: DISCONTINUED | OUTPATIENT
Start: 2017-11-27 | End: 2017-11-28 | Stop reason: HOSPADM

## 2017-11-27 RX ORDER — ACETAMINOPHEN 325 MG/1
650 TABLET ORAL
Status: DISCONTINUED | OUTPATIENT
Start: 2017-11-27 | End: 2017-11-28 | Stop reason: HOSPADM

## 2017-11-27 RX ORDER — SENNOSIDES 8.6 MG/1
1 TABLET ORAL DAILY
Status: DISCONTINUED | OUTPATIENT
Start: 2017-11-27 | End: 2017-11-28 | Stop reason: HOSPADM

## 2017-11-27 RX ORDER — ALLOPURINOL 100 MG/1
100 TABLET ORAL DAILY
Status: DISCONTINUED | OUTPATIENT
Start: 2017-11-27 | End: 2017-11-28 | Stop reason: HOSPADM

## 2017-11-27 RX ORDER — AZATHIOPRINE 50 MG/1
50 TABLET ORAL
Status: DISCONTINUED | OUTPATIENT
Start: 2017-11-27 | End: 2017-11-28 | Stop reason: HOSPADM

## 2017-11-27 RX ORDER — CALCITRIOL 0.25 UG/1
0.25 CAPSULE ORAL DAILY
Status: DISCONTINUED | OUTPATIENT
Start: 2017-11-27 | End: 2017-11-28

## 2017-11-27 RX ORDER — PANTOPRAZOLE SODIUM 40 MG/1
40 TABLET, DELAYED RELEASE ORAL
Status: DISCONTINUED | OUTPATIENT
Start: 2017-11-27 | End: 2017-11-28 | Stop reason: HOSPADM

## 2017-11-27 RX ORDER — LANOLIN ALCOHOL/MO/W.PET/CERES
400 CREAM (GRAM) TOPICAL 2 TIMES DAILY
Status: DISCONTINUED | OUTPATIENT
Start: 2017-11-27 | End: 2017-11-28 | Stop reason: HOSPADM

## 2017-11-27 RX ORDER — OXYCODONE HYDROCHLORIDE 5 MG/1
5 TABLET ORAL
Status: DISCONTINUED | OUTPATIENT
Start: 2017-11-27 | End: 2017-11-28 | Stop reason: HOSPADM

## 2017-11-27 RX ADMIN — Medication 400 MG: at 19:51

## 2017-11-27 RX ADMIN — POTASSIUM CHLORIDE 10 MEQ: 10 INJECTION, SOLUTION INTRAVENOUS at 00:46

## 2017-11-27 RX ADMIN — Medication 400 MG: at 08:42

## 2017-11-27 RX ADMIN — POTASSIUM CHLORIDE 10 MEQ: 10 INJECTION, SOLUTION INTRAVENOUS at 01:50

## 2017-11-27 RX ADMIN — Medication 10 ML: at 22:11

## 2017-11-27 RX ADMIN — HYDROXYCHLOROQUINE SULFATE 200 MG: 200 TABLET, FILM COATED ORAL at 19:51

## 2017-11-27 RX ADMIN — SODIUM CHLORIDE, SODIUM LACTATE, CALCIUM CHLORIDE, MAGNESIUM CHLORIDE AND DEXTROSE: 2.5; 538; 448; 18.3; 5.08 INJECTION, SOLUTION INTRAPERITONEAL at 08:55

## 2017-11-27 RX ADMIN — Medication 10 ML: at 00:47

## 2017-11-27 RX ADMIN — OXYCODONE HYDROCHLORIDE 5 MG: 5 TABLET ORAL at 00:47

## 2017-11-27 RX ADMIN — POTASSIUM CHLORIDE 40 MEQ: 750 TABLET, FILM COATED, EXTENDED RELEASE ORAL at 08:41

## 2017-11-27 RX ADMIN — PANTOPRAZOLE SODIUM 40 MG: 40 TABLET, DELAYED RELEASE ORAL at 06:52

## 2017-11-27 RX ADMIN — OXYCODONE HYDROCHLORIDE 5 MG: 5 TABLET ORAL at 22:23

## 2017-11-27 RX ADMIN — ALLOPURINOL 100 MG: 100 TABLET ORAL at 08:51

## 2017-11-27 RX ADMIN — Medication 1 CAPSULE: at 06:52

## 2017-11-27 RX ADMIN — APIXABAN 5 MG: 5 TABLET, FILM COATED ORAL at 22:11

## 2017-11-27 RX ADMIN — Medication 10 ML: at 15:15

## 2017-11-27 RX ADMIN — CALCITRIOL 0.25 MCG: 0.25 CAPSULE, LIQUID FILLED ORAL at 12:11

## 2017-11-27 RX ADMIN — HYDROXYCHLOROQUINE SULFATE 200 MG: 200 TABLET, FILM COATED ORAL at 08:42

## 2017-11-27 RX ADMIN — SODIUM CHLORIDE, SODIUM LACTATE, CALCIUM CHLORIDE, MAGNESIUM CHLORIDE AND DEXTROSE: 2.5; 538; 448; 18.3; 5.08 INJECTION, SOLUTION INTRAPERITONEAL at 15:05

## 2017-11-27 RX ADMIN — SENNOSIDES 8.6 MG: 8.6 TABLET, FILM COATED ORAL at 08:42

## 2017-11-27 RX ADMIN — AZATHIOPRINE 50 MG: 50 TABLET ORAL at 08:51

## 2017-11-27 RX ADMIN — SODIUM CHLORIDE, SODIUM LACTATE, CALCIUM CHLORIDE, MAGNESIUM CHLORIDE AND DEXTROSE: 2.5; 538; 448; 18.3; 5.08 INJECTION, SOLUTION INTRAPERITONEAL at 22:11

## 2017-11-27 RX ADMIN — SODIUM CHLORIDE, SODIUM LACTATE, CALCIUM CHLORIDE, MAGNESIUM CHLORIDE AND DEXTROSE: 2.5; 538; 448; 18.3; 5.08 INJECTION, SOLUTION INTRAPERITONEAL at 12:01

## 2017-11-27 RX ADMIN — PREDNISONE 9 MG: 1 TABLET ORAL at 08:43

## 2017-11-27 RX ADMIN — APIXABAN 5 MG: 5 TABLET, FILM COATED ORAL at 12:11

## 2017-11-27 RX ADMIN — POTASSIUM CHLORIDE 40 MEQ: 750 TABLET, FILM COATED, EXTENDED RELEASE ORAL at 22:10

## 2017-11-27 RX ADMIN — AMLODIPINE BESYLATE 10 MG: 5 TABLET ORAL at 08:44

## 2017-11-27 RX ADMIN — Medication 10 ML: at 06:52

## 2017-11-27 NOTE — ED TRIAGE NOTES
Pt reports having bilateral foot bilateral leg and abdominal swelling for the past 10 days. Pt denies SOB of chest pain. Pt is currently on Peritoneal Dialysis. Pt uses the cycle machine @ night for 9 hours. Pt ran out of bags for her machine this past Thursday and has been doing manual exchanges every 4 hours.

## 2017-11-27 NOTE — PROGRESS NOTES
11/27/17 1514   Vital Signs   Temp 98.7 °F (37.1 °C)   Temp Source Oral   Pulse (Heart Rate) (!) 118   Heart Rate Source Monitor   Resp Rate 18   O2 Sat (%) 96 %   Level of Consciousness Alert   /78   MAP (Calculated) 91   BP 1 Method Automatic   BP 1 Location Left arm   BP Patient Position At rest   MEWS Score 3   Pain 1   Pain Scale 1 Numeric (0 - 10)   Pain Intensity 1 0   Patient Stated Pain Goal 0   Patient Observation   Patient Turned Turns self   Activity In bed;Resting quietly   Notified Dr. Flako Steel. No orders received; will continue to monitor.

## 2017-11-27 NOTE — ED NOTES
Patient resting in stretcher. IV potassium infusing, VSS, respirations unlabored and in no acute distress upon transfer to the floor.

## 2017-11-27 NOTE — PROGRESS NOTES
Hospitalist Progress Note  Boy Lester MD  Answering service: [phone] OR [phone] from in house phone      Date of Service:  2017  NAME:  Venus Box  :  1985  MRN:  [id number]      Admission Summary:   34 yo woman with ESRD on PD, lupus, HTN, h/o VTE, lupus nephritis, HLD, and DJD presented from home on 17 with worsening bilateral leg and abdominal swelling. She had run out of PD supplies on . She was admitted with anasarca, hypokalemia, and severe hypoalbuminemia. Interval history / Subjective:   Still c/o leg swelling and has no other complaints; seen with nurse and CM during rounds     Assessment & Plan:     Hypokalemia (POA) - replete prn    Anasarca likely due to severe hypoalbuminemia (POA)  - likely due to suboptimal PD  - Renal consulted: IV albumin/Lasix unlikely to help due to minimal urination  - continue PD; Renal to check with PD nurse about PD supplies at home  - if unable to remove fluid may need temporary HD    ESRD on PD - as above and per Renal    SLE with lupus nephritis - continue Plaquenil, azathioprine, prednisone    HTN - controlled with current meds    H/o VTE - apixaban     Anemia of ESRD - stable H/H; transfuse for Hb<7    Lactic acidosis (POA)   - unclear etiology although intravascular volume depletion may be a factor  - was elevated 2.8 on 10/27  - patient is afebrile    Indeterminate troponin elevation (POA)  - denies chest pain  - may be due to ESRD    Code status: full  DVT prophylaxis: apixaban    Care Plan discussed with: Patient/Family, Nurse,  and Consultant Renal  Disposition: Likely tomorrow if PD supplies available at home     Hospital Problems  Date Reviewed: 2017          Codes Class Noted POA    * (Principal)Hypokalemia ICD-10-CM: E87.6  ICD-9-CM: 276.8  10/27/2017 Yes            Review of Systems:   Pertinent items are noted in HPI.      Vital Signs: Last 24hrs VS reviewed since prior progress note. Most recent are:  Visit Vitals    /78 (BP 1 Location: Left arm, BP Patient Position: At rest)    Pulse (!) 118    Temp 98.7 °F (37.1 °C)    Resp 20    Ht 5' 5\" (1.651 m)    Wt 81.7 kg (180 lb 1.9 oz)    SpO2 96%    BMI 29.97 kg/m2       Intake/Output Summary (Last 24 hours) at 11/27/17 1603  Last data filed at 11/27/17 1150   Gross per 24 hour   Intake             2700 ml   Output             2500 ml   Net              200 ml      Physical Examination:     Constitutional:  awake, no acute distress, cooperative, pleasant    ENT:  oral mucosa moist, oropharynx benign, moon facies  Neck supple, no masses   Resp:  CTA bilaterally, no wheezing/rhonchi/rales   CV:  regular rhythm, tachycardia, no m/r/g appreciated, b/l LE edema, +pulses    GI:  +BS, soft, non distended, non tender, +PD catheter     Musculoskeletal:  moves all extremities    Neurologic:  awake, alert, responds appropriately to questions and commands     Skin:  warm, very dry, and flaky; PD catheter  Eyes:  PERRL    Data Review:    Review and/or order of clinical lab test  Review and/or order of tests in the radiology section of CPT  Review and/or order of tests in the medicine section of CPT    Labs:     Recent Labs      11/26/17 2038   WBC  4.1   HGB  10.3*   HCT  31.3*   PLT  258     Recent Labs      11/27/17   0302  11/26/17 2038   NA  139  139   K  3.0*  2.3*   CL  105  101   CO2  26  29   BUN  32*  33*   CREA  8.19*  8.40*   GLU  70  84   CA  6.1*  7.2*   MG  1.5*  1.6     Recent Labs      11/27/17   0302  11/26/17 2038   SGOT  47*  43*   ALT  18  20   AP  58  66   TBILI  0.3  0.4   TP  4.4*  5.5*   ALB  1.2*  1.5*  1.5*   GLOB  3.2  4.0     No results for input(s): INR, PTP, APTT in the last 72 hours. No lab exists for component: INREXT   No results for input(s): FE, TIBC, PSAT, FERR in the last 72 hours.    Lab Results   Component Value Date/Time    Folate 3.9 11/06/2017 11:49 AM      No results for input(s): PH, PCO2, PO2 in the last 72 hours. Recent Labs      11/27/17   1138  11/27/17   0302  11/26/17 2038   TROIQ  0.05*  0.05*  0.05*     Lab Results   Component Value Date/Time    Cholesterol, total 117 09/25/2015 02:02 PM    HDL Cholesterol 31 09/25/2015 02:02 PM    LDL, calculated 57 09/25/2015 02:02 PM    Triglyceride 146 09/25/2015 02:02 PM    CHOL/HDL Ratio 7.7 05/31/2009 10:30 AM     Lab Results   Component Value Date/Time    Glucose (POC) 95 09/16/2015 12:08 PM    Glucose (POC) 116 09/03/2013 06:20 AM    Glucose (POC) 151 09/02/2013 09:07 PM    Glucose (POC) 162 09/02/2013 04:57 PM    Glucose (POC) 133 09/02/2013 11:58 AM    Glucose (POC) 203 09/01/2013 11:02 PM     Lab Results   Component Value Date/Time    Color YELLOW/STRAW 08/12/2016 09:06 PM    Appearance CLEAR 08/12/2016 09:06 PM    Specific gravity 1.017 08/12/2016 09:06 PM    Specific gravity 1.010 07/11/2016 12:44 PM    pH (UA) 7.0 08/12/2016 09:06 PM    Protein 300 08/12/2016 09:06 PM    Glucose NEGATIVE  08/12/2016 09:06 PM    Ketone TRACE 08/12/2016 09:06 PM    Bilirubin NEGATIVE  07/11/2016 12:44 PM    Urobilinogen 1.0 08/12/2016 09:06 PM    Nitrites NEGATIVE  08/12/2016 09:06 PM    Leukocyte Esterase NEGATIVE  08/12/2016 09:06 PM    Epithelial cells MODERATE 08/12/2016 09:06 PM    Bacteria 1+ 08/12/2016 09:06 PM    WBC 0-4 08/12/2016 09:06 PM    RBC 0-5 08/12/2016 09:06 PM     Medications Reviewed:     Current Facility-Administered Medications   Medication Dose Route Frequency    . PHARMACY TO SUBSTITUTE PER PROTOCOL    Per Protocol    apixaban (ELIQUIS) tablet 5 mg  5 mg Oral Q12H    amLODIPine (NORVASC) tablet 10 mg  10 mg Oral DAILY    allopurinol (ZYLOPRIM) tablet 100 mg  100 mg Oral DAILY    azaTHIOprine (IMURAN) tablet 50 mg  50 mg Oral DAILY AFTER BREAKFAST    [START ON 11/30/2017] ergocalciferol (ERGOCALCIFEROL) capsule 50,000 Units  50,000 Units Oral Q7D    hydroxychloroquine (PLAQUENIL) tablet 200 mg  200 mg Oral BID    lactobac ac& pc-s.therm-b.anim (AJIT Q/RISAQUAD)  1 Cap Oral ACB    magnesium oxide (MAG-OX) tablet 400 mg  400 mg Oral BID    oxyCODONE IR (ROXICODONE) tablet 5 mg  5 mg Oral Q6H PRN    pantoprazole (PROTONIX) tablet 40 mg  40 mg Oral ACB    predniSONE (DELTASONE) tablet 9 mg  9 mg Oral DAILY WITH BREAKFAST    senna (SENOKOT) tablet 8.6 mg  1 Tab Oral DAILY    albuterol (PROVENTIL VENTOLIN) nebulizer solution 5 mg  5 mg Nebulization Q6H PRN    acetaminophen (TYLENOL) tablet 650 mg  650 mg Oral Q6H PRN    peritoneal dialysis with additives   IntraPERitoneal 5XD    calcitRIOL (ROCALTROL) capsule 0.25 mcg  0.25 mcg Oral DAILY    sodium chloride (NS) flush 5-10 mL  5-10 mL IntraVENous Q8H    sodium chloride (NS) flush 5-10 mL  5-10 mL IntraVENous PRN    ondansetron (ZOFRAN) injection 4 mg  4 mg IntraVENous Q4H PRN     ______________________________________________________________________  EXPECTED LENGTH OF STAY: 4d 21h  ACTUAL LENGTH OF STAY:          1                 Randy Ríos MD

## 2017-11-27 NOTE — ED PROVIDER NOTES
HPI Comments: 33 y.o. female with past medical history significant for lupus, HTN, ESRD, asthma, peritoneal dialysis, anemia, carditis, heart failure, hypercholesterolemia, chronic pain, PE, malignant HTN with CKD who presents accompanied by her family with chief complaint of leg swelling. The pt c/o severe bilateral lower extremity swelling that has been ongoing for the last 2 weeks. The pt admits that the swelling has spread to her abdomen. The pt believes the swelling has mildly improved in the last 2-3 days. The pt explains that she is a peritoneal dialysis pt and she ran out of bags 2 nights ago. The pt says that she has been continuing dialysis manually since then. The pt indicates that she has been trying to take off some fluid in the process. The pt admits that she has previously had this much leg swelling before once or twice, but it has never persisted for this long. The pt reports that she had a fever last night and today. The pt states that she has been admitted with pneumonia twice in the last month. The pt says that she is \"mostly\" compliant with all of her medication, but she admits that she will skip medication if she has not eaten. The pt says that she has taken all of her medication today. The pt denies SOB, , n/v/d, and constipation. There are no other acute medical concerns at this time. Social hx: nonsmoker  PCP: Jennifer Meraz NP  Nephrology: Dr. Rosibel Jama. Note written by Boy Hernandez, as dictated by Dulce Cotto MD 8:48 PM     The history is provided by the patient. No  was used.         Past Medical History:   Diagnosis Date    Anemia     secondary to lupus    Asthma     no inhaler use in past 2 to 3 years    Carditis     Chronic pain     DDD (degenerative disc disease), lumbar     [facility]D (end stage renal disease) ([facility] [address]     Heart failure (HCC)     Hypercholesterolemia     Hypertension     Intractable nausea and vomiting 10/21/2015    Long term (current) use of anticoagulants     Lupus (systemic lupus erythematosus) (HCC)     Malignant hypertension with chronic kidney disease stage V ([facility] [address]     Peritoneal dialysis status ([facility] [address] 10/2015    [facility] 2013    lungs       Past Surgical History:   Procedure Laterality Date    HX  SECTION  2006    HX OTHER SURGICAL  9/16/15    INSERTION PD CATH         Family History:   Problem Relation Age of Onset    Diabetes Father     Hypertension Father     Cancer Other      aunt with breast cancer    Diabetes Mother        Social History     Social History    Marital status: SINGLE     Spouse name: N/A    Number of children: N/A    Years of education: N/A     Occupational History    Not on file. Social History Main Topics    Smoking status: Never Smoker    Smokeless tobacco: Never Used    Alcohol use No    Drug use: Yes     Special: Prescription, OTC    Sexual activity: Not Currently     Other Topics Concern     Service No    Blood Transfusions No    Caffeine Concern No    Occupational Exposure No    Hobby Hazards No    Sleep Concern No    Stress Concern No    Weight Concern No    Special Diet No    Back Care Yes     low back pain    Exercise No    Bike Helmet No    Seat Belt Yes    Self-Exams No     Social History Narrative    Lives with parents and daughter. ALLERGIES: Compazine [prochlorperazine edisylate] and Fish containing products    Review of Systems   Constitutional: Positive for fever (since yesterday). Negative for activity change and chills. HENT: Negative for nosebleeds, sore throat, trouble swallowing and voice change. Eyes: Negative for visual disturbance. Respiratory: Negative for shortness of breath. Cardiovascular: Positive for leg swelling (Bilaterally). Negative for chest pain and palpitations. Gastrointestinal: Positive for abdominal distention.  Negative for abdominal pain, constipation, diarrhea, nausea and vomiting. Genitourinary: Negative for difficulty urinating, dysuria, hematuria and urgency. Musculoskeletal: Negative for back pain, neck pain and neck stiffness. Skin: Negative for color change. Allergic/Immunologic: Negative for immunocompromised state. Neurological: Negative for dizziness, seizures, syncope, weakness, light-headedness, numbness and headaches. Psychiatric/Behavioral: Negative for behavioral problems, confusion, hallucinations, self-injury and suicidal ideas. Vitals:    11/26/17 2045 11/26/17 2100 11/26/17 2115 11/26/17 2130   BP: (!) 129/96 (!) 133/100 (!) 127/103 (!) 132/94   Pulse: (!) 101 (!) 101 (!) 114 100   Resp: 11 12 15 16   Temp:       SpO2: 97% 100%  99%   Weight:       Height:                Physical Exam   Constitutional: She is oriented to person, place, and time. She appears well-developed and well-nourished. No distress. HENT:   Head: Normocephalic and atraumatic. Eyes: Pupils are equal, round, and reactive to light. Neck: Normal range of motion. Neck supple. Cardiovascular: Normal rate, regular rhythm and normal heart sounds. Exam reveals no gallop and no friction rub. No murmur heard. Pulmonary/Chest: Effort normal and breath sounds normal. No respiratory distress. She has no wheezes. Abdominal: Soft. Bowel sounds are normal. She exhibits no distension. There is no tenderness. There is no rebound and no guarding. LLQ peritoneal catheter in place with no surrounding erythema or tenderness. Musculoskeletal: Normal range of motion. She exhibits edema (4+ pitting edema in her bilateral lower extremity. She has some edema in her abdomen). Neurological: She is alert and oriented to person, place, and time. Skin: Skin is warm. No rash noted. She is not diaphoretic. Psychiatric: She has a normal mood and affect. Her behavior is normal. Judgment and thought content normal.   Nursing note and vitals reviewed.    Note written by Aleta Sanchezr, Aaronibprice, as dictated by Luiz Caballero MD 8:48 PM     [facility]  ED Course   This is a 20-year-old female with past medical history, review of systems, physical exam as above, presenting with complaints of diffuse edema, anasarca. Patient states long history of peritoneal dialysis, secondary to lupus nephritis, states edema has been worsening over the last 2 weeks. He denies fevers, chills, nausea, vomiting, chest pain or shortness of breath. She states that several days ago she ran out of dialysate, and switched to manual dialysis. Physical exam remarkable for chronically ill-appearing female, in no acute distress, with 4+ pitting pedal edema extending to the groin, abdominal edema, clear breath sounds auscultation, mildly tachycardic, elevated temperature without afebrile, soft nontender abdomen, left lower quadrant peritoneal dialysis site, without surrounding erythema, nontender to palpation. Unclear what the etiology of her anasarca is, though suspect inadequate fluid removal. Plan to obtain CMP, CBC, lactic acid, chest x-ray, and consult with nephrology, for possible admission. We will make a disposition based on the patient's diagnostics and response to therapy. Procedures      ED EKG interpretation:  Rhythm: sinus tachycardia; and regular . Rate (approx.): 108 bpm; ST/T wave: normal; Significant artifact. Note written by Boy Cox, as dictated by Luiz Caballero MD 9:06 PM         CONSULT NOTE:  9:11 PM Luiz Caballero MD spoke with Dr. Angel Berry, Consult for Nephrology. Discussed available diagnostic tests and clinical findings. He is in agreement with care plans as outlined. Dr. Angel Berry recommends admitting the pt and they will adjust her peritoneal dialysis. 9:11 PM  Patient is being admitted to the hospital.  The results of their tests and reasons for their admission have been discussed with them and/or available family.   They convey agreement and understanding for the need to be admitted and for their admission diagnosis. Consultation will be made now with the inpatient physician for hospitalization. CONSULT NOTE:  10:03 PM Solomon Trujillo MD spoke with Dr. Devendra Godinez, Consult for Hospitalist.  Discussed available diagnostic tests and clinical findings. He is in agreement with care plans as outlined. Dr. Devendra Godinez will see and admit pt for further evaluation of treatment. PROGRESS NOTE:  10:39 PM Abdominal US without safe fluid collection for paracentesis.

## 2017-11-27 NOTE — ROUTINE PROCESS
TRANSFER - OUT REPORT:    Verbal report given to Brigid Kirk (name) on [facility]  being transferred to [facility](unit) for routine progression of care       Report consisted of patients Situation, Background, Assessment and   Recommendations(SBAR). Information from the following report(s) SBAR, ED Summary, Intake/Output, MAR and Recent Results was reviewed with the receiving nurse. Lines:   Peripheral IV 11/26/17 Right Antecubital (Active)   Site Assessment Clean, dry, & intact 11/26/2017  8:50 PM   Phlebitis Assessment 0 11/26/2017  8:50 PM   Infiltration Assessment 0 11/26/2017  8:50 PM   Dressing Status Clean, dry, & intact 11/26/2017  8:50 PM   Dressing Type Transparent 11/26/2017  8:50 PM   Hub Color/Line Status Pink;Patent; Flushed 11/26/2017  8:50 PM       Peripheral IV 11/26/17 Left Antecubital (Active)   Site Assessment Clean, dry, & intact 11/26/2017  9:37 PM   Phlebitis Assessment 0 11/26/2017  9:37 PM   Infiltration Assessment 0 11/26/2017  9:37 PM   Dressing Status Clean, dry, & intact 11/26/2017  9:37 PM   Dressing Type Transparent 11/26/2017  9:37 PM   Hub Color/Line Status Pink;Flushed;Patent 11/26/2017  9:37 PM   Action Taken Blood drawn 11/26/2017  9:37 PM        Opportunity for questions and clarification was provided.       Patient transported with:   Monitor  Registered Nurse

## 2017-11-27 NOTE — H&P
[address] [facility] [address]   HISTORY AND PHYSICAL       Name:  Gurvinder Ro   MR#:  [id number]   :  1985   Account #:  [de-identified]        Date of Adm:  2017       HISTORY OF PRESENT ILLNESS: This is a 27-year-old black female   who presents to the emergency department from home due to   worsening bilateral lower extremity edema that has progressed all the   way up to her abdomen. Per the patient, edema has been present and   progressing for approximately 2 weeks. She reports that her urine   output has decreased and that she is urinating approximately once per   day. She reports subjective fever with no chills. She also has fatigue   and excessive tiredness since the onset of symptoms. She reports no   shortness of breath, chest pain, nausea, vomiting, diarrhea,   constipation, incontinence, hematuria, or dysuria. PAST MEDICAL HISTORY:   1. Hypertension. 2. Systemic lupus erythematosus. 3. Lupus nephritis. 4. End-stage renal disease with peritoneal dialysis. 5. Hypercholesterolemia. 6. Degenerative joint disease. PAST SURGICAL HISTORY:   1. . 2. PD catheter insertion. MEDICATIONS    1. Albuterol HFA 1-2 puffs 4 hours p.r.n.   2. Allopurinol 100 mg p.o. every day. 3. Amlodipine 10 mg p.o. every day. 4. Augmentin 875/125 one tab p.o. b.i.d.   5. Apixaban 5 mg p.o. b.i.d.   6. Auryxia 210 mg 2 tablets p.o. t.i.d. with meals. 7. Azathioprine 50 mg p.o. every day. 8. Calcitriol 0.25 mcg p.o. every day. 9. Carvedilol 3.125 mg p.o. b.i.d. 10. Cyanocobalamin 2500 mcg sublingual tablet every day. 11. Ergocalciferol 50,000 units p.o. every 7 days. 12. Plaquenil 200 mg p.o. b.i.d.   13. Florajen-3 one cap p.o. every day. 14. Magnesium oxide 400 mg p.o. b.i.d.   15. Toprol- mg p.o. every day. 16. Zofran 8 mg p.o. q.8h. p.r.n.   17. Oxycodone IR 5 mg p.o. p.r.n. 18. Pantoprazole 40 mg p.o. every day.    19. Prednisone 9 mg p.o. every day. 20. Senna 8.6 mg p.o. every day p.r.n. ALLERGIES:   1. COMPAZINE. 2. FISH. FAMILY HISTORY: Significant for diabetes mellitus type 2 and   hypertension. SOCIAL HISTORY: The patient lives with her parents and daughter. She denies any alcohol, tobacco or illicit drug usage. REVIEW OF SYSTEMS: Sixteen systems were reviewed and not   significant except for as stated in HPI. PHYSICAL EXAMINATION   GENERAL: Black female lying in bed in no acute cardiopulmonary   distress. VITAL SIGNS: Temperature 100.2 degrees Fahrenheit, pulse 100,   respirations 16, blood pressure 132/94, O2 saturation 99% on room   air. HEAD: Normocephalic and atraumatic. No frontotemporal   tenderness. EYES: Pupils equally round, reactive to light. Anicteric sclerae,   conjunctivae pink and moist.   NOSE: No deviated or perforated septum or nasal discharge. MOUTH/THROAT: Oral mucosa pink, moist. Mouth and throat also   with no lesions. CARDIOVASCULAR: Tachycardic and regular. S1, S2 audible. No   murmurs, gallops, or rubs. No JVD. Bilateral pitting lower extremity   edema at 4+. Abdomen has nonpitting edema. No carotid or abdominal   bruits. Greater saphenous artery pulses are 2+ bilaterally. LUNGS: Clear to auscultation bilaterally. No wheeze, rales or rhonchi. ABDOMEN: Bowel sounds present, soft. Peritoneal dialysis catheter   present. Nontender. No erythema at insertion. Edematous abdominal   wall inferiorly. Nontender. No guarding or rebound. No   hepatosplenomegaly. MUSCULOSKELETAL: Gross range of motion of the upper and lower   extremities bilaterally. No gross muscle atrophy is noted. NEUROLOGIC: Alert and oriented to person, place, time. Cranial   nerves 2-12 grossly intact. Biceps and patellar deep tendon reflexes 2+   bilaterally. Muscle strength 5/5 in all extremities. SKIN: No rashes or pallor. ENDOCRINE: No thyromegaly.    LYMPHATIC: Negative for cervical, axillary or inguinal   lymphadenopathy. LABORATORY DATA:   CBC with differential: WBC 4.1, hemoglobin   10.3, hematocrit 31.3, platelets 021, MCV 18.5, neutrophils 55%,   lymphocytes 35%, monocytes 9%, eosinophils 0%, basophils 1%. CMP: Sodium 139, potassium 2.3, chloride 101, bicarbonate 29, anion   gap 9, BUN 33, creatinine 8.4, glucose 84. Calcium 7.2, corrected to   9.2. Total bilirubin 0.4, total protein 5.5, albumin 1.5, ALT 20, AST 43,   alkaline phosphatase 66. Lactic acid 2.9. Troponin 0.05. ProBNP 13,071. CHEST X-RAY: The chest x-ray, per Radiology, shows no acute   cardiopulmonary process seen. ELECTROCARDIOGRAM: EKG shows sinus tachycardia. ASSESSMENT: A 69-year-old black female with anasarca/nephrotic   syndrome secondary to worsening lupus nephritis. PROBLEM LIST:   1. Anasarca. 2. Nephrotic syndrome. 3. Normocytic anemia. 4. Elevated troponin. 5. Lupus nephritis. 6. Hypertension. 7. Systemic lupus erythematosus. 8. End-stage renal disease on peritoneal dialysis. 9. Hypercholesterolemia. PLAN: The patient will be admitted to telemetry floor, given her   hypokalemia of 2.3. She has been started on potassium chloride IV;   however, I will give her p.o. and check her magnesium. Nephrology   has been consulted. Theory is that her volume overload/anasarca has   been due to her hypoalbuminemia, which is due to nephrotic syndrome   from her lupus nephritis. We will await further recommendations. We   will hold off placing loop diuretic at this time given her hypokalemia. Will obtain daily weights. Given her tachycardia and subjective fever,   spontaneous bacterial peritonitis must be considered given her   peritoneal dialysis. Peritoneal study is pending at this time. Will   continue Augmentin. In regard to her lupus nephritis and her SLE will continue her   azathioprine, Plaquenil and prednisone.  We will continue her   amlodipine for her hypertension at this time; however, must consider   that this can cause lower extremity edema, but given her history, most   likely this edema is due to the hypoalbuminemia due to her renal   disease. Given her nephrotic syndrome, she is in a hypercoagulable state. Will   continue apixaban at this time. We will continue carvedilol for her   hypertension and hold her Toprol-XL. The patient does have a   normocytic anemia, which is likely due to her renal disease and lupus. She also has an elevated troponin, which is most likely due to   decreased clearance from the kidney. We will obtain serial troponins. Her proBNP is 13,071, which is also likely due to decreased clearance   from the kidney or volume overload. As of right now this does not   appear to be affecting cardiac functioning at this time. DEEP VENOUS THROMBOSIS PROPHYLAXIS: Her DVT prophylaxis   will be covered by apixaban as mentioned above. CODE STATUS: HER CODE STATUS IS FULL CODE. TIME SPENT: Total time for admission is 75 minutes.         Reva Stephenson IV, MD      OB / Monica Diez   D:  11/27/2017   00:15   T:  11/27/2017   04:57   Job #:  [id number]

## 2017-11-27 NOTE — ED NOTES
Patient resting comfortably in stretcher. Warm blanket provided, denies any needs or concerns at this time. IV potassium infusing, will continue to monitor.

## 2017-11-27 NOTE — PROGRESS NOTES
TRANSFER - IN REPORT:    Verbal report received from Cindy(name) on [facility]  being received from ED(unit) for routine progression of care      Report consisted of patients Situation, Background, Assessment and   Recommendations(SBAR). Information from the following report(s) SBAR, Kardex, ED Summary, Intake/Output, MAR, Recent Results and Cardiac Rhythm ST was reviewed with the receiving nurse. Opportunity for questions and clarification was provided. Assessment completed upon patients arrival to unit and care assumed.          Primary Nurse Pradeep Huang RN and Porter Beavers RN performed a dual skin assessment on this patient No impairment noted  Vikash score is 22

## 2017-11-27 NOTE — CONSULTS
Consulted by Dr. Uziel Mena. Patient well known to our service. Hx of ESRD on CCPD, Lupus nephritis, HTN, PE on coumadin, Anemia,SHPT presenting with worsening edema/fatigue. Review of lab work significant for acute on chronic hypokalemia. Elevated BNP. Depressed serum albumin certainly contributing to volume overload. Need to correct K. Oral/IV KCl replacement already ordered. Check Mag level. Will discuss with patient about  temporarily converting to HD if we can not achieve effective ultrafiltration with PD.  Will staff formal consult in am.    Fabricio Feliz MD  [facility] ([phone])

## 2017-11-27 NOTE — CONSULTS
Assessment:  ESRD: CCPD-> CAPD while inpt  Hypokalemia/Hypomagnesemia-> acute on chronic  Volume overload: multifactorial. Hypoalbuminemia contributing  HTN: Fair  Hx of PE: on Eliquis  Lupus: on plaquinil/azathioprine/prednisone  Anemia: Hgb stable  SHPT: Recent hypercalcemia (outpt-> holding oral calcitriol). Corrected Ca today 8.1    Plan/Recommendations:  CAPD while inpatient-> 2.5% dianeal + added 3meq/L KCl . Set for 5exchanges/day  Will consider 4.25% dextrose bags if needed  If unable to achieve appropriate ultrafiltration will need to consider transitioning patient to HD temporarily. Oral KCl/Mag supplementation  Encourage protein intake/supplements  Resume Calcitriol at 0.25mcg po daily  Daily weights  Am labs      Discussed with patient and RN    Thanks for the consultation. Renal service will follow patient with you. Please contact me with any questions or concerns. Initial Consult note         Patient name: Barbara Alston  MR no: [id number]  Date of admission: 11/26/2017  Date of consultation: 11/27/2017  Requested by: Dr. Christene Litten  Reason for consult: ESRD/Volume overload    Patient seen and examined. History obtained from patient and chart review. Relevant labs, data and notes reviewed. HPI: Barbara Alston is a 33 y.o. female with PMH significant for ESRD on CCPD, Lupus nephritis, HTN, PE on coumadin, Anemia,SHPT presented yesterday with worsening LE edema/fatigue. Patient reports running out of PD bags so has been doing manual exchanges with 2.5% dianeal bags. Reports good compliance-> upt 4times/day. Notable increase in LE edema -> progressed to abdomen over the past week or so. Labs on admission significant for serum K 2.3 (was low at 2.8 as output approx 1week ago-> on oral KCl supplements) and serum albumin down to 1.5. Admits to poor solid intake. PD fluid clear. No fevers/chills. No abd pain.  No n/v/d.    PMH:  Past Medical History: Diagnosis Date    Anemia     secondary to lupus    Asthma     no inhaler use in past 2 to 3 years    Carditis     Chronic pain     DDD (degenerative disc disease), lumbar     [facility] disease) [facility] [facility]     Heart failure (HCC)     Hypercholesterolemia     Hypertension     Intractable nausea and vomiting 10/21/2015    Long term (current) use of anticoagulants     Lupus (systemic lupus erythematosus) (HCC)     Malignant hypertension with chronic kidney disease stage V ([facility] [facility]     Peritoneal dialysis status ([facility] [address] [facility] 10/2015    Thromboembolus ([facility] [address] 2013    lungs     PSH:  Past Surgical History:   Procedure Laterality Date    HX  SECTION  2006    HX OTHER SURGICAL  9/16/15    INSERTION PD CATH       Social history:   Social History   Substance Use Topics    Smoking status: Never Smoker    Smokeless tobacco: Never Used    Alcohol use No       Family history:  Not contributory    Allergies   Allergen Reactions    Compazine [Prochlorperazine Edisylate] Nausea and Vomiting and Palpitations    Fish Containing Products Rash     An itchy rash appeared in about 1 hour       Current Facility-Administered Medications   Medication Dose Route Frequency Last Dose    . PHARMACY TO SUBSTITUTE PER PROTOCOL    Per Protocol      apixaban (ELIQUIS) tablet 5 mg  5 mg Oral Q12H      amLODIPine (NORVASC) tablet 10 mg  10 mg Oral DAILY 10 mg at 17 0844    allopurinol (ZYLOPRIM) tablet 100 mg  100 mg Oral DAILY 100 mg at 17 0851    azaTHIOprine (IMURAN) tablet 50 mg  50 mg Oral DAILY AFTER BREAKFAST 50 mg at 17 0851    [START ON 2017] ergocalciferol (ERGOCALCIFEROL) capsule 50,000 Units  50,000 Units Oral Q7D      hydroxychloroquine (PLAQUENIL) tablet 200 mg  200 mg Oral  mg at 17 0842    lactobac ac& pc-s.therm-b.anim (AJIT Q/RISAQUAD)  1 Cap Oral ACB 1 Cap at 17 06    magnesium oxide (MAG-OX) tablet 400 mg  400 mg Oral  mg at 17 0842    oxyCODONE IR (ROXICODONE) tablet 5 mg  5 mg Oral Q6H PRN 5 mg at 11/27/17 0047    pantoprazole (PROTONIX) tablet 40 mg  40 mg Oral ACB 40 mg at 11/27/17 3081    predniSONE (DELTASONE) tablet 9 mg  9 mg Oral DAILY WITH BREAKFAST 9 mg at 11/27/17 0843    senna (SENOKOT) tablet 8.6 mg  1 Tab Oral DAILY 8.6 mg at 11/27/17 0842    albuterol (PROVENTIL VENTOLIN) nebulizer solution 5 mg  5 mg Nebulization Q6H PRN      acetaminophen (TYLENOL) tablet 650 mg  650 mg Oral Q6H PRN      potassium chloride SR (KLOR-CON 10) tablet 40 mEq  40 mEq Oral NOW      peritoneal dialysis with additives   IntraPERitoneal 5XD      sodium chloride (NS) flush 5-10 mL  5-10 mL IntraVENous Q8H 10 mL at 11/27/17 0652    sodium chloride (NS) flush 5-10 mL  5-10 mL IntraVENous PRN      ondansetron (ZOFRAN) injection 4 mg  4 mg IntraVENous Q4H PRN         ROS (besides HPI):    General: No fever. +Fatigue. + weight changes  ENT: No hearing loss or visual changes  Cardiovascular: No Chest pain. +edema  Pulmonary: No SOB  GI: No abdominal pain. No Nausea/Vomiting/Diarrhea. No blood in stool  : No blood in urine. No foamy or cloudy urine  Musculoskeletal: No joint swelling or redness. No morning stiffness  Endocrine: no cold or heat intolerance  Psych: denies anxiety or depression  Neuro: No light headedness or dizziness    Objective   Visit Vitals    BP (!) 128/95    Pulse 99    Temp 98.3 °F (36.8 °C)    Resp 20    Ht 5' 5\" (1.651 m)    Wt 81.7 kg (180 lb 1.9 oz)    SpO2 98%    BMI 29.97 kg/m2       Physical Exam:    Gen: NAD    HEENT: AT/NC, EOMI, moist mucous membrane, no scleral icterus    Neck: no JVD, no cervical lymphadenopathy, no carotid bruit    Lungs/Chest wall: Breath sounds normal. Symmetrical chest wall expansion. No accessory muscle use. Clear to auscultation    Cardiovascular: S1/S2, normal rate, regular rhythm. Abdomen: soft, NT, ND, BS+, no HSM    Ext: ++ LE edema    Skin: warm and dry.  No rashes    : no CVA tenderness    CNS: alert awake. Answers appropriately. Labs/Data:    Lab Results   Component Value Date/Time    Sodium 139 11/27/2017 03:02 AM    Potassium 3.0 11/27/2017 03:02 AM    Chloride 105 11/27/2017 03:02 AM    CO2 26 11/27/2017 03:02 AM    Anion gap 8 11/27/2017 03:02 AM    Glucose 70 11/27/2017 03:02 AM    BUN 32 11/27/2017 03:02 AM    Creatinine 8.19 11/27/2017 03:02 AM    BUN/Creatinine ratio 4 11/27/2017 03:02 AM    GFR est AA 7 11/27/2017 03:02 AM    GFR est non-AA 6 11/27/2017 03:02 AM    Calcium 6.1 11/27/2017 03:02 AM       Lab Results   Component Value Date/Time    WBC 4.1 11/26/2017 08:38 PM    Hemoglobin (POC) 11.2 09/16/2015 12:08 PM    HGB 10.3 11/26/2017 08:38 PM    Hematocrit (POC) 33 09/16/2015 12:08 PM    HCT 31.3 11/26/2017 08:38 PM    PLATELET 414 87/99/2542 08:38 PM    MCV 86.5 11/26/2017 08:38 PM       Urine analysis: No results found for this or any previous visit.         No components found for: SPEP, UPEP  Lab Results   Component Value Date/Time    Protein,urine 24 hr 2071.00 05/30/2009 10:00 PM    Total protein 7.30 07/27/2009 03:30 AM     Lab Results   Component Value Date/Time    Microalbumin/Creat ratio (mg/g creat) 1015 09/22/2009 06:30 PM    Microalb/Creat ratio (ug/mg creat.) 2443.5 03/07/2014 02:16 PM    Microalbumin,urine random 250.86 09/22/2009 06:30 PM         Intake/Output Summary (Last 24 hours) at 11/27/17 1009  Last data filed at 11/27/17 0030   Gross per 24 hour   Intake              200 ml   Output                0 ml   Net              200 ml       Wt Readings from Last 3 Encounters:   11/27/17 81.7 kg (180 lb 1.9 oz)   11/10/17 78.5 kg (173 lb)   11/06/17 75.8 kg (167 lb)       Signed by:  Bisi Gutierrez MD  Nephrology and Hypertension  [facility]

## 2017-11-27 NOTE — PROGRESS NOTES
Problem: General Medical Care Plan  Goal: *Vital signs within specified parameters  Outcome: Progressing Towards Goal  Stable  Goal: *Optimal pain control at patient's stated goal  Outcome: Progressing Towards Goal  Controlled at or below pt stated goal    0655 K pad heating pump delivered to floor, set up pt PD bag in heating pad and blanket, will not be able to administer PD until bag is warmed    0745 Bedside and Verbal shift change report given to Earnestine (oncoming nurse) by Kartik Purdy (offgoing nurse). Report included the following information SBAR, Kardex, ED Summary, Intake/Output, MAR, Recent Results and Cardiac Rhythm S tach.

## 2017-11-28 VITALS
OXYGEN SATURATION: 96 % | SYSTOLIC BLOOD PRESSURE: 122 MMHG | HEIGHT: 65 IN | HEART RATE: 103 BPM | TEMPERATURE: 99.3 F | BODY MASS INDEX: 29.49 KG/M2 | DIASTOLIC BLOOD PRESSURE: 82 MMHG | RESPIRATION RATE: 20 BRPM | WEIGHT: 177 LBS

## 2017-11-28 LAB
ALBUMIN SERPL-MCNC: 1.3 G/DL (ref 3.5–5)
ALBUMIN/GLOB SERPL: 0.4 {RATIO} (ref 1.1–2.2)
ALP SERPL-CCNC: 65 U/L (ref 45–117)
ALT SERPL-CCNC: 19 U/L (ref 12–78)
ANION GAP SERPL CALC-SCNC: 11 MMOL/L (ref 5–15)
AST SERPL-CCNC: 45 U/L (ref 15–37)
BILIRUB SERPL-MCNC: 0.3 MG/DL (ref 0.2–1)
BUN SERPL-MCNC: 31 MG/DL (ref 6–20)
BUN/CREAT SERPL: 4 (ref 12–20)
CALCIUM SERPL-MCNC: 7.3 MG/DL (ref 8.5–10.1)
CHLORIDE SERPL-SCNC: 103 MMOL/L (ref 97–108)
CO2 SERPL-SCNC: 26 MMOL/L (ref 21–32)
CREAT SERPL-MCNC: 8.03 MG/DL (ref 0.55–1.02)
ERYTHROCYTE [DISTWIDTH] IN BLOOD BY AUTOMATED COUNT: 17.5 % (ref 11.5–14.5)
GLOBULIN SER CALC-MCNC: 3.7 G/DL (ref 2–4)
GLUCOSE SERPL-MCNC: 112 MG/DL (ref 65–100)
HCT VFR BLD AUTO: 28 % (ref 35–47)
HGB BLD-MCNC: 9.3 G/DL (ref 11.5–16)
LACTATE SERPL-SCNC: 3 MMOL/L (ref 0.4–2)
LACTATE SERPL-SCNC: 3.3 MMOL/L (ref 0.4–2)
LACTATE SERPL-SCNC: 3.6 MMOL/L (ref 0.4–2)
MAGNESIUM SERPL-MCNC: 1.6 MG/DL (ref 1.6–2.4)
MCH RBC QN AUTO: 28.1 PG (ref 26–34)
MCHC RBC AUTO-ENTMCNC: 33.2 G/DL (ref 30–36.5)
MCV RBC AUTO: 84.6 FL (ref 80–99)
PHOSPHATE SERPL-MCNC: 1.7 MG/DL (ref 2.6–4.7)
PLATELET # BLD AUTO: 267 K/UL (ref 150–400)
POTASSIUM SERPL-SCNC: 4.1 MMOL/L (ref 3.5–5.1)
PROT SERPL-MCNC: 5 G/DL (ref 6.4–8.2)
RBC # BLD AUTO: 3.31 M/UL (ref 3.8–5.2)
SODIUM SERPL-SCNC: 140 MMOL/L (ref 136–145)
WBC # BLD AUTO: 3 K/UL (ref 3.6–11)

## 2017-11-28 PROCEDURE — 36415 COLL VENOUS BLD VENIPUNCTURE: CPT

## 2017-11-28 PROCEDURE — 83605 ASSAY OF LACTIC ACID: CPT | Performed by: HOSPITALIST

## 2017-11-28 PROCEDURE — 83605 ASSAY OF LACTIC ACID: CPT

## 2017-11-28 PROCEDURE — 83735 ASSAY OF MAGNESIUM: CPT | Performed by: INTERNAL MEDICINE

## 2017-11-28 PROCEDURE — 74011636637 HC RX REV CODE- 636/637: Performed by: INTERNAL MEDICINE

## 2017-11-28 PROCEDURE — 74011250636 HC RX REV CODE- 250/636: Performed by: INTERNAL MEDICINE

## 2017-11-28 PROCEDURE — 84100 ASSAY OF PHOSPHORUS: CPT | Performed by: INTERNAL MEDICINE

## 2017-11-28 PROCEDURE — A4722 DIALYS SOL FLD VOL > 1999CC: HCPCS | Performed by: INTERNAL MEDICINE

## 2017-11-28 PROCEDURE — 74011250637 HC RX REV CODE- 250/637: Performed by: INTERNAL MEDICINE

## 2017-11-28 PROCEDURE — 85027 COMPLETE CBC AUTOMATED: CPT | Performed by: INTERNAL MEDICINE

## 2017-11-28 PROCEDURE — 80053 COMPREHEN METABOLIC PANEL: CPT | Performed by: INTERNAL MEDICINE

## 2017-11-28 RX ORDER — POTASSIUM CHLORIDE 1500 MG/1
40 TABLET, FILM COATED, EXTENDED RELEASE ORAL DAILY
Qty: 30 TAB | Refills: 0 | Status: ON HOLD | OUTPATIENT
Start: 2017-11-28 | End: 2017-12-13

## 2017-11-28 RX ORDER — POTASSIUM CHLORIDE 750 MG/1
40 TABLET, FILM COATED, EXTENDED RELEASE ORAL
Status: COMPLETED | OUTPATIENT
Start: 2017-11-28 | End: 2017-11-28

## 2017-11-28 RX ORDER — CARVEDILOL 6.25 MG/1
12.5 TABLET ORAL 2 TIMES DAILY WITH MEALS
Status: ON HOLD | COMMUNITY
End: 2018-06-28

## 2017-11-28 RX ADMIN — AZATHIOPRINE 50 MG: 50 TABLET ORAL at 08:42

## 2017-11-28 RX ADMIN — Medication 10 ML: at 14:17

## 2017-11-28 RX ADMIN — HYDROXYCHLOROQUINE SULFATE 200 MG: 200 TABLET, FILM COATED ORAL at 08:43

## 2017-11-28 RX ADMIN — SODIUM CHLORIDE, SODIUM LACTATE, CALCIUM CHLORIDE, MAGNESIUM CHLORIDE AND DEXTROSE: 2.5; 538; 448; 18.3; 5.08 INJECTION, SOLUTION INTRAPERITONEAL at 02:11

## 2017-11-28 RX ADMIN — Medication 1 CAPSULE: at 07:27

## 2017-11-28 RX ADMIN — ALLOPURINOL 100 MG: 100 TABLET ORAL at 08:42

## 2017-11-28 RX ADMIN — CALCITRIOL 0.25 MCG: 0.25 CAPSULE, LIQUID FILLED ORAL at 08:42

## 2017-11-28 RX ADMIN — SODIUM CHLORIDE, SODIUM LACTATE, CALCIUM CHLORIDE, MAGNESIUM CHLORIDE AND DEXTROSE: 2.5; 538; 448; 18.3; 5.08 INJECTION, SOLUTION INTRAPERITONEAL at 15:00

## 2017-11-28 RX ADMIN — SODIUM CHLORIDE, SODIUM LACTATE, CALCIUM CHLORIDE, MAGNESIUM CHLORIDE AND DEXTROSE: 2.5; 538; 448; 18.3; 5.08 INJECTION, SOLUTION INTRAPERITONEAL at 12:10

## 2017-11-28 RX ADMIN — SENNOSIDES 8.6 MG: 8.6 TABLET, FILM COATED ORAL at 08:42

## 2017-11-28 RX ADMIN — POTASSIUM CHLORIDE 40 MEQ: 750 TABLET, FILM COATED, EXTENDED RELEASE ORAL at 11:14

## 2017-11-28 RX ADMIN — Medication 400 MG: at 08:43

## 2017-11-28 RX ADMIN — SODIUM CHLORIDE, SODIUM LACTATE, CALCIUM CHLORIDE, MAGNESIUM CHLORIDE AND DEXTROSE: 2.5; 538; 448; 18.3; 5.08 INJECTION, SOLUTION INTRAPERITONEAL at 15:07

## 2017-11-28 RX ADMIN — SODIUM CHLORIDE, SODIUM LACTATE, CALCIUM CHLORIDE, MAGNESIUM CHLORIDE AND DEXTROSE: 2.5; 538; 448; 18.3; 5.08 INJECTION, SOLUTION INTRAPERITONEAL at 08:50

## 2017-11-28 RX ADMIN — PREDNISONE 9 MG: 1 TABLET ORAL at 08:42

## 2017-11-28 RX ADMIN — Medication 10 ML: at 07:27

## 2017-11-28 RX ADMIN — PANTOPRAZOLE SODIUM 40 MG: 40 TABLET, DELAYED RELEASE ORAL at 07:27

## 2017-11-28 RX ADMIN — AMLODIPINE BESYLATE 10 MG: 5 TABLET ORAL at 08:43

## 2017-11-28 RX ADMIN — APIXABAN 5 MG: 5 TABLET, FILM COATED ORAL at 08:43

## 2017-11-28 NOTE — PROGRESS NOTES
Bedside and Verbal shift change report given to Keri Hartman RN (oncoming nurse) by Pilar Garvin RN (offgoing nurse). Report included the following information SBAR, Kardex, Intake/Output, MAR, Recent Results and Cardiac Rhythm NSR.

## 2017-11-28 NOTE — ROUTINE PROCESS
I have reviewed discharge instructions with the patient and caregiver. The patient and parent verbalized understanding. Discharge medications reviewed with patient and caregiver and appropriate educational materials and side effects teaching were provided. Current Discharge Medication List      START taking these medications    Details   potassium chloride SR (K-TAB) 20 mEq tablet Take 2 Tabs by mouth daily for 15 days. Qty: 30 Tab, Refills: 0         CONTINUE these medications which have NOT CHANGED    Details   carvedilol (COREG) 6.25 mg tablet Take 6.25 mg by mouth two (2) times daily (with meals). apixaban (ELIQUIS) 5 mg tablet Take 5 mg by mouth two (2) times a day. oxyCODONE IR (ROXICODONE) 5 mg immediate release tablet Take 1 tab in AM and HALF to 1 tab in PM if needed on days where back pain is severe     Qty: 40 Tab, Refills: 0    Associated Diagnoses: Chronic bilateral low back pain without sciatica      azaTHIOprine (IMURAN) 50 mg tablet       magnesium oxide (MAG-OX) 400 mg tablet Take 1 Tab by mouth two (2) times a day for 30 days. Qty: 60 Tab, Refills: 0      albuterol (PROVENTIL HFA, VENTOLIN HFA, PROAIR HFA) 90 mcg/actuation inhaler Take 1-2 Puffs by inhalation every four (4) hours as needed for Wheezing or Shortness of Breath. Qty: 1 Inhaler, Refills: 2      l.acidoph-B.lactis-B.longum (FLORAJEN3) 460 mg (7.5-6- 1.5 bill. cell) cap cap Take 1 Cap by mouth Daily (before breakfast). Qty: 5 Cap, Refills: 0      amLODIPine (NORVASC) 10 mg tablet Take 1 Tab by mouth daily. Qty: 30 Tab, Refills: 5    Associated Diagnoses: Malignant hypertension      AURYXIA 210 mg iron tablet TK 2 TS PO WITH MEALS  Refills: 12      predniSONE (DELTASONE) 10 mg tablet Take 9 mg by mouth daily (with breakfast). ergocalciferol (VITAMIN D2) 50,000 unit capsule Take 50,000 Units by mouth every seven (7) days. hydroxychloroquine (PLAQUENIL) 200 mg tablet Take 200 mg by mouth two (2) times a day. ondansetron hcl (ZOFRAN) 8 mg tablet Take 1 Tab by mouth every eight (8) hours as needed. For Nausea  Indications: GASTROPARESIS  Qty: 15 Tab, Refills: 1      allopurinol (ZYLOPRIM) 100 mg tablet Take 100 mg by mouth daily. cyanocobalamin (VITAMIN B-12) 2,500 mcg sublingual tablet Take 1 Tab by mouth daily. Qty: 90 Tab, Refills: 1    Associated Diagnoses: Leukopenia; Low vitamin B12 level; Hypotension due to drugs      pantoprazole (PROTONIX) 40 mg tablet Take 1 Tab by mouth Daily (before breakfast). Qty: 30 Tab, Refills: 0      senna (SENNA) 8.6 mg tablet Take 1 Tab by mouth daily as needed. STOP taking these medications       amoxicillin-clavulanate (AUGMENTIN) 875-125 mg per tablet Comments:   Reason for Stopping:         calcitRIOL (ROCALTROL) 0.25 mcg capsule Comments:   Reason for Stopping:             PIV and telemetry discontinued per MD order. Patient and belongings discharged home with mother in personal vehicle.

## 2017-11-28 NOTE — PROGRESS NOTES
Hospitalist Progress Note  Anastasiya Swan MD  Answering service: [phone] OR [phone] from in house phone      Date of Service:  2017  NAME:  Akua Rodney  :  1985  MRN:  [id number]      Admission Summary:   34 yo woman with ESRD on PD, lupus, HTN, h/o VTE, lupus nephritis, HLD, and DJD presented from home on 17 with worsening bilateral leg and abdominal swelling. She had run out of PD supplies on . She was admitted with anasarca, hypokalemia, and severe hypoalbuminemia. Interval history / Subjective:   No complains this morning had PD here     Assessment & Plan:     Hypokalemia (POA) - resolved    Anasarca likely due to severe hypoalbuminemia (POA)  - likely due to suboptimal PD  - Renal consulted: IV albumin/Lasix unlikely to help due to minimal urination  - continue PD; Renal to check with PD nurse about PD supplies at home    ESRD on PD - as above and per Renal    SLE with lupus nephritis - continue Plaquenil, azathioprine, prednisone    HTN - controlled with current meds    H/o VTE - apixaban     Anemia of ESRD - stable H/H; transfuse for Hb<7    Lactic acidosis (POA)   - unclear etiology although intravascular volume depletion may be a factor  - was elevated 2.8 on 10/27 still elevated no sign of any infection by labs   - patient is afebrile    Indeterminate troponin elevation (POA)  - denies chest pain  - may be due to ESRD    Code status: full  DVT prophylaxis: apixaban    Care Plan discussed with: Patient/Family, Nurse,  and Consultant Renal  Disposition:home if cleared with nephrology      Hospital Problems  Date Reviewed: 2017          Codes Class Noted POA    * (Principal)Hypokalemia ICD-10-CM: E87.6  ICD-9-CM: 276.8  10/27/2017 Yes            Review of Systems:   Pertinent items are noted in HPI. Vital Signs:    Last 24hrs VS reviewed since prior progress note.  Most recent are:  Visit Vitals    /90    Pulse (!) 104    Temp 98.4 °F (36.9 °C)    Resp 13    Ht 5' 5\" (1.651 m)    Wt 80.3 kg (177 lb)    SpO2 97%    BMI 29.45 kg/m2       Intake/Output Summary (Last 24 hours) at 11/28/17 0818  Last data filed at 11/28/17 0215   Gross per 24 hour   Intake            18973 ml   Output            37743 ml   Net            -1000 ml      Physical Examination:     Constitutional:  awake, no acute distress, cooperative, pleasant    ENT:  oral mucosa moist, oropharynx benign, moon facies  Neck supple, no masses   Resp:  CTA bilaterally, no wheezing/rhonchi/rales   CV:  regular rhythm, tachycardia, no m/r/g appreciated, b/l LE edema, +pulses    GI:  +BS, soft, non distended, non tender, +PD catheter     Musculoskeletal:  moves all extremities    Neurologic:  awake, alert, responds appropriately to questions and commands     Skin:  warm, very dry, and flaky; PD catheter  Eyes:  PERRL    Data Review:    Review and/or order of clinical lab test  Review and/or order of tests in the radiology section of CPT  Review and/or order of tests in the medicine section of CPT    Labs:     Recent Labs      11/28/17 0051 11/26/17 2038   WBC  3.0*  4.1   HGB  9.3*  10.3*   HCT  28.0*  31.3*   PLT  267  258     Recent Labs      11/28/17 0051 11/27/17 1745 11/27/17 0302 11/26/17 2038   NA  140   --   139  139   K  4.1  3.2*  3.0*  2.3*   CL  103   --   105  101   CO2  26   --   26  29   BUN  31*   --   32*  33*   CREA  8.03*   --   8.19*  8.40*   GLU  112*   --   70  84   CA  7.3*   --   6.1*  7.2*   MG  1.6   --   1.5*  1.6   PHOS  1.7*   --    --    --      Recent Labs      11/28/17 0051 11/27/17 0302 11/26/17 2038   SGOT  45*  47*  43*   ALT  19  18  20   AP  65  58  66   TBILI  0.3  0.3  0.4   TP  5.0*  4.4*  5.5*   ALB  1.3*  1.2*  1.5*  1.5*   GLOB  3.7  3.2  4.0     No results for input(s): INR, PTP, APTT in the last 72 hours.     No lab exists for component: INREXT, INREXT   No results for input(s): FE, TIBC, PSAT, FERR in the last 72 hours. Lab Results   Component Value Date/Time    Folate 3.9 11/06/2017 11:49 AM      No results for input(s): PH, PCO2, PO2 in the last 72 hours. Recent Labs      11/27/17   1138  11/27/17   0302  11/26/17   2038   TROIQ  0.05*  0.05*  0.05*     Lab Results   Component Value Date/Time    Cholesterol, total 117 09/25/2015 02:02 PM    HDL Cholesterol 31 09/25/2015 02:02 PM    LDL, calculated 57 09/25/2015 02:02 PM    Triglyceride 146 09/25/2015 02:02 PM    CHOL/HDL Ratio 7.7 05/31/2009 10:30 AM     Lab Results   Component Value Date/Time    Glucose (POC) 95 09/16/2015 12:08 PM    Glucose (POC) 116 09/03/2013 06:20 AM    Glucose (POC) 151 09/02/2013 09:07 PM    Glucose (POC) 162 09/02/2013 04:57 PM    Glucose (POC) 133 09/02/2013 11:58 AM    Glucose (POC) 203 09/01/2013 11:02 PM     Lab Results   Component Value Date/Time    Color YELLOW/STRAW 08/12/2016 09:06 PM    Appearance CLEAR 08/12/2016 09:06 PM    Specific gravity 1.017 08/12/2016 09:06 PM    Specific gravity 1.010 07/11/2016 12:44 PM    pH (UA) 7.0 08/12/2016 09:06 PM    Protein 300 08/12/2016 09:06 PM    Glucose NEGATIVE  08/12/2016 09:06 PM    Ketone TRACE 08/12/2016 09:06 PM    Bilirubin NEGATIVE  07/11/2016 12:44 PM    Urobilinogen 1.0 08/12/2016 09:06 PM    Nitrites NEGATIVE  08/12/2016 09:06 PM    Leukocyte Esterase NEGATIVE  08/12/2016 09:06 PM    Epithelial cells MODERATE 08/12/2016 09:06 PM    Bacteria 1+ 08/12/2016 09:06 PM    WBC 0-4 08/12/2016 09:06 PM    RBC 0-5 08/12/2016 09:06 PM     Medications Reviewed:     Current Facility-Administered Medications   Medication Dose Route Frequency    . PHARMACY TO SUBSTITUTE PER PROTOCOL    Per Protocol    apixaban (ELIQUIS) tablet 5 mg  5 mg Oral Q12H    amLODIPine (NORVASC) tablet 10 mg  10 mg Oral DAILY    allopurinol (ZYLOPRIM) tablet 100 mg  100 mg Oral DAILY    azaTHIOprine (IMURAN) tablet 50 mg  50 mg Oral DAILY AFTER BREAKFAST    [START ON 11/30/2017] ergocalciferol (ERGOCALCIFEROL) capsule 50,000 Units  50,000 Units Oral Q7D    hydroxychloroquine (PLAQUENIL) tablet 200 mg  200 mg Oral BID    lactobac ac& pc-s.therm-b.anim (AJIT Q/RISAQUAD)  1 Cap Oral ACB    magnesium oxide (MAG-OX) tablet 400 mg  400 mg Oral BID    oxyCODONE IR (ROXICODONE) tablet 5 mg  5 mg Oral Q6H PRN    pantoprazole (PROTONIX) tablet 40 mg  40 mg Oral ACB    predniSONE (DELTASONE) tablet 9 mg  9 mg Oral DAILY WITH BREAKFAST    senna (SENOKOT) tablet 8.6 mg  1 Tab Oral DAILY    albuterol (PROVENTIL VENTOLIN) nebulizer solution 5 mg  5 mg Nebulization Q6H PRN    acetaminophen (TYLENOL) tablet 650 mg  650 mg Oral Q6H PRN    peritoneal dialysis with additives   IntraPERitoneal 5XD    calcitRIOL (ROCALTROL) capsule 0.25 mcg  0.25 mcg Oral DAILY    sodium chloride (NS) flush 5-10 mL  5-10 mL IntraVENous Q8H    sodium chloride (NS) flush 5-10 mL  5-10 mL IntraVENous PRN    ondansetron (ZOFRAN) injection 4 mg  4 mg IntraVENous Q4H PRN     ______________________________________________________________________  EXPECTED LENGTH OF STAY: 4d 21h  ACTUAL LENGTH OF STAY:          2                 Scott Jordan MD

## 2017-11-28 NOTE — PROGRESS NOTES
Pharmacist Discharge Medication Reconciliation    Discharging Provider: Dr. Francia Davalos PMH:   Past Medical History:   Diagnosis Date    Anemia     secondary to lupus    Asthma     no inhaler use in past 2 to 3 years    Carditis     Chronic pain     DDD (degenerative disc disease), lumbar     [facility]) [facility] [facility]     Heart failure ([facility] [address]     Hypercholesterolemia     Hypertension     Intractable nausea and vomiting 10/21/2015    Long term (current) use of anticoagulants     Lupus (systemic lupus erythematosus) (HCC)     Malignant hypertension with chronic kidney disease stage V ([facility] [address]     Peritoneal dialysis status ([facility] [address] 10/2015    Thromboembolus ([facility] [address] 2013    lungs     Chief Complaint for this Admission:   Chief Complaint   Patient presents with    Swelling     Allergies: Compazine [prochlorperazine edisylate] and Fish containing products    Discharge Medications:   Current Discharge Medication List        START taking these medications    Details   potassium chloride SR (K-TAB) 20 mEq tablet Take 2 Tabs by mouth daily for 15 days. Qty: 30 Tab, Refills: 0           CONTINUE these medications which have NOT CHANGED    Details   carvedilol (COREG) 6.25 mg tablet Take 6.25 mg by mouth two (2) times daily (with meals). apixaban (ELIQUIS) 5 mg tablet Take 5 mg by mouth two (2) times a day. oxyCODONE IR (ROXICODONE) 5 mg immediate release tablet Take 1 tab in AM and HALF to 1 tab in PM if needed on days where back pain is severe     Qty: 40 Tab, Refills: 0    Associated Diagnoses: Chronic bilateral low back pain without sciatica      azaTHIOprine (IMURAN) 50 mg tablet       magnesium oxide (MAG-OX) 400 mg tablet Take 1 Tab by mouth two (2) times a day for 30 days. Qty: 60 Tab, Refills: 0      albuterol (PROVENTIL HFA, VENTOLIN HFA, PROAIR HFA) 90 mcg/actuation inhaler Take 1-2 Puffs by inhalation every four (4) hours as needed for Wheezing or Shortness of Breath.   Qty: 1 Inhaler, Refills: 2      l.acidoph-B.lactis-B.longum (FLORAJEN3) 460 mg (7.5-6- 1.5 bill. cell) cap cap Take 1 Cap by mouth Daily (before breakfast). Qty: 5 Cap, Refills: 0      amLODIPine (NORVASC) 10 mg tablet Take 1 Tab by mouth daily. Qty: 30 Tab, Refills: 5    Associated Diagnoses: Malignant hypertension      AURYXIA 210 mg iron tablet TK 2 TS PO WITH MEALS  Refills: 12      predniSONE (DELTASONE) 10 mg tablet Take 9 mg by mouth daily (with breakfast). ergocalciferol (VITAMIN D2) 50,000 unit capsule Take 50,000 Units by mouth every seven (7) days. hydroxychloroquine (PLAQUENIL) 200 mg tablet Take 200 mg by mouth two (2) times a day. ondansetron hcl (ZOFRAN) 8 mg tablet Take 1 Tab by mouth every eight (8) hours as needed. For Nausea  Indications: GASTROPARESIS  Qty: 15 Tab, Refills: 1      allopurinol (ZYLOPRIM) 100 mg tablet Take 100 mg by mouth daily. cyanocobalamin (VITAMIN B-12) 2,500 mcg sublingual tablet Take 1 Tab by mouth daily. Qty: 90 Tab, Refills: 1    Associated Diagnoses: Leukopenia; Low vitamin B12 level; Hypotension due to drugs      pantoprazole (PROTONIX) 40 mg tablet Take 1 Tab by mouth Daily (before breakfast). Qty: 30 Tab, Refills: 0      senna (SENNA) 8.6 mg tablet Take 1 Tab by mouth daily as needed.            STOP taking these medications       amoxicillin-clavulanate (AUGMENTIN) 875-125 mg per tablet Comments:   Reason for Stopping:         calcitRIOL (ROCALTROL) 0.25 mcg capsule Comments:   Reason for Stopping:               The patient's chart, MAR and AVS were reviewed by Malou Cheney, PHARMD.

## 2017-11-28 NOTE — DISCHARGE INSTRUCTIONS
Discharge Instructions       PATIENT ID: Jerome Chahal  MRN: [id number]   YOB: 1985    DATE OF ADMISSION: 11/26/2017  8:07 PM    DATE OF DISCHARGE: 11/28/2017    PRIMARY CARE PROVIDER: Hussein Ball NP     ATTENDING PHYSICIAN: Bere Abbasi MD  DISCHARGING PROVIDER: Bere Abbasi MD    To contact this individual call [phone] and ask the  to page. If unavailable ask to be transferred the Adult Hospitalist Department. DISCHARGE DIAGNOSES Hypokalemia and issue with PD     CONSULTATIONS: IP CONSULT TO NEPHROLOGY  IP CONSULT TO NEPHROLOGY  IP CONSULT TO HOSPITALIST    PROCEDURES/SURGERIES: * No surgery found *    PENDING TEST RESULTS:   At the time of discharge the following test results are still pending:     FOLLOW UP APPOINTMENTS:   Follow-up Information     Follow up With Details Comments Contact Info    Hussein Ball NP In 2 weeks  Luisa Mehta 56      Hannah Rahman MD In 3 days  [address]  [address]  [phone]             ADDITIONAL CARE RECOMMENDATIONS:     DIET: Renal Diet    ACTIVITY: Activity as tolerated    WOUND CARE:     EQUIPMENT needed:       DISCHARGE MEDICATIONS:   See Medication Reconciliation Form    · It is important that you take the medication exactly as they are prescribed. · Keep your medication in the bottles provided by the pharmacist and keep a list of the medication names, dosages, and times to be taken in your wallet. · Do not take other medications without consulting your doctor. NOTIFY YOUR PHYSICIAN FOR ANY OF THE FOLLOWING:   Fever over 101 degrees for 24 hours. Chest pain, shortness of breath, fever, chills, nausea, vomiting, diarrhea, change in mentation, falling, weakness, bleeding. Severe pain or pain not relieved by medications. Or, any other signs or symptoms that you may have questions about.       DISPOSITION:  x  Home With:   OT  PT [facility]  RN       SNF/Inpatient Rehab/LTAC    Independent/assisted living    Hospice    Other:     CDMP Checked:   Yes x     PROBLEM LIST Updated:  Yes x       Signed:   Anastasiya Swan MD  11/28/2017  12:28 PM

## 2017-11-28 NOTE — PROGRESS NOTES
Patient name: Kirill Blackwell  MRN: [id number]    Nephrology Progress note:    Assessment:  ESRD: CCPD-> CAPD while inpt  Hypokalemia/Hypomagnesemia-> acute on chronic  Volume overload: multifactorial. Hypoalbuminemia contributing  HTN: Fair  Hx of PE: on Eliquis  Lupus: on plaquinil/azathioprine/prednisone  Anemia: Hgb stable  SHPT: Recent hypercalcemia (outpt-> holding oral calcitriol). Corrected Ca today 8.1    Plan/Recommendations:  Stable for discharge   Advised patient to continue 2.5% Dextrose/5 manual exchanges/day  Updated PD RN-> f/u within 1week  Oral KCl 40meq PO daily  Hold Calcitriol on discharge        Subjective:  No events overnight. Feels like edema is better. No cramping. Solid intake improving    ROS:   No nausea, no vomiting  No chest pain, no shortness of breath    Exam:  Visit Vitals    /90    Pulse (!) 104    Temp 98.4 °F (36.9 °C)    Resp 13    Ht 5' 5\" (1.651 m)    Wt 80.3 kg (177 lb)    SpO2 98%    BMI 29.45 kg/m2     Wt Readings from Last 3 Encounters:   11/28/17 80.3 kg (177 lb)   11/10/17 78.5 kg (173 lb)   11/06/17 75.8 kg (167 lb)       Intake/Output Summary (Last 24 hours) at 11/28/17 0959  Last data filed at 11/28/17 0800   Gross per 24 hour   Intake            33601 ml   Output            93016 ml   Net            -1150 ml       Gen: NAD  HEENT: No icterus, mmm, no oral exudate, AT/NC  Lungs/Chest wall: Clear. No accessory muscle use. Cardiovascular: Regular rate, normal rhythm. Abdomen/: Soft, NT, ND, BS+. PD catheter  Ext: +peripheral edema  CNS: alert and awake. Answers appropriately      Current Facility-Administered Medications   Medication Dose Route Frequency Last Dose    . PHARMACY TO SUBSTITUTE PER PROTOCOL    Per Protocol      apixaban (ELIQUIS) tablet 5 mg  5 mg Oral Q12H 5 mg at 11/28/17 0843    amLODIPine (NORVASC) tablet 10 mg  10 mg Oral DAILY 10 mg at 11/28/17 0843    allopurinol (ZYLOPRIM) tablet 100 mg  100 mg Oral DAILY 100 mg at 11/28/17 0842    azaTHIOprine (IMURAN) tablet 50 mg  50 mg Oral DAILY AFTER BREAKFAST 50 mg at 11/28/17 0842    [START ON 11/30/2017] ergocalciferol (ERGOCALCIFEROL) capsule 50,000 Units  50,000 Units Oral Q7D      hydroxychloroquine (PLAQUENIL) tablet 200 mg  200 mg Oral  mg at 11/28/17 0843    lactobac ac& pc-s.therm-b.anim (AJIT Q/RISAQUAD)  1 Cap Oral ACB 1 Cap at 11/28/17 0727    magnesium oxide (MAG-OX) tablet 400 mg  400 mg Oral  mg at 11/28/17 0843    oxyCODONE IR (ROXICODONE) tablet 5 mg  5 mg Oral Q6H PRN 5 mg at 11/27/17 2223    pantoprazole (PROTONIX) tablet 40 mg  40 mg Oral ACB 40 mg at 11/28/17 0727    predniSONE (DELTASONE) tablet 9 mg  9 mg Oral DAILY WITH BREAKFAST 9 mg at 11/28/17 0842    senna (SENOKOT) tablet 8.6 mg  1 Tab Oral DAILY 8.6 mg at 11/28/17 0842    albuterol (PROVENTIL VENTOLIN) nebulizer solution 5 mg  5 mg Nebulization Q6H PRN      acetaminophen (TYLENOL) tablet 650 mg  650 mg Oral Q6H PRN      peritoneal dialysis with additives   IntraPERitoneal 5XD      calcitRIOL (ROCALTROL) capsule 0.25 mcg  0.25 mcg Oral DAILY 0.25 mcg at 11/28/17 0842    sodium chloride (NS) flush 5-10 mL  5-10 mL IntraVENous Q8H 10 mL at 11/28/17 0727    sodium chloride (NS) flush 5-10 mL  5-10 mL IntraVENous PRN      ondansetron (ZOFRAN) injection 4 mg  4 mg IntraVENous Q4H PRN         Labs/Data:    Lab Results   Component Value Date/Time    WBC 3.0 11/28/2017 12:51 AM    Hemoglobin (POC) 11.2 09/16/2015 12:08 PM    HGB 9.3 11/28/2017 12:51 AM Hematocrit (POC) 33 09/16/2015 12:08 PM    HCT 28.0 11/28/2017 12:51 AM    PLATELET 335 24/56/5879 12:51 AM    MCV 84.6 11/28/2017 12:51 AM       Lab Results   Component Value Date/Time    Sodium 140 11/28/2017 12:51 AM    Potassium 4.1 11/28/2017 12:51 AM    Chloride 103 11/28/2017 12:51 AM    CO2 26 11/28/2017 12:51 AM    Anion gap 11 11/28/2017 12:51 AM    Glucose 112 11/28/2017 12:51 AM    BUN 31 11/28/2017 12:51 AM    Creatinine 8.03 11/28/2017 12:51 AM    BUN/Creatinine ratio 4 11/28/2017 12:51 AM    GFR est AA 7 11/28/2017 12:51 AM    GFR est non-AA 6 11/28/2017 12:51 AM    Calcium 7.3 11/28/2017 12:51 AM       Patient seen and examined. Chart reviewed. Labs, data and other pertinent notes reviewed in last 24 hrs.     Discussed with patient and Dr. Andrade Branch by:  Karissa Timmons MD

## 2017-11-28 NOTE — DISCHARGE SUMMARY
Discharge Summary       PATIENT ID: Jerome Chahal  MRN: [id number]   YOB: 1985    DATE OF ADMISSION: 11/26/2017  8:07 PM    DATE OF DISCHARGE: 11/28/17   PRIMARY CARE PROVIDER: Hussein Ball NP     ATTENDING PHYSICIAN: Tod Vick  DISCHARGING PROVIDER: Bere Abbasi MD    To contact this individual call [phone] and ask the  to page. If unavailable ask to be transferred the Adult Hospitalist Department. CONSULTATIONS: IP CONSULT TO NEPHROLOGY  IP CONSULT TO NEPHROLOGY  IP CONSULT TO HOSPITALIST    PROCEDURES/SURGERIES: * No surgery found *    ADMITTING DIAGNOSES & HOSPITAL COURSE:   34 yo woman with ESRD on PD, lupus, HTN, h/o VTE, lupus nephritis, HLD, and DJD presented from home on 11/26/17 with worsening bilateral leg and abdominal swelling. She had run out of PD supplies on 11/24.  She was admitted with anasarca, hypokalemia, and severe hypoalbuminemia.         Assessment & Plan:      Hypokalemia (POA) - resolved 40 meq daily     Anasarca likely due to severe hypoalbuminemia (POA)  - continue PD; Renal to check with PD nurse about PD supplies at home     ESRD on PD - as above and per Renal     SLE with lupus nephritis - continue Plaquenil, azathioprine, prednisone     HTN - controlled with current meds     H/o VTE - apixaban      Anemia of ESRD - stable H/H; transfuse for Hb<7     Lactic acidosis (POA)   - unclear etiology although intravascular volume depletion may be a factor  - patient is afebrile     Indeterminate troponin elevation (POA)  - denies chest pain  - may be due to ESRD     Code status: full  DVT prophylaxis: apixaban       PENDING TEST RESULTS:   At the time of discharge the following test results are still pending:     FOLLOW UP APPOINTMENTS:    Follow-up Information     Follow up With Details Comments Contact Info    Hussein Ball NP In 2 weeks  [address]isa Mehta 56      Hannah Rahman MD In 3 days  [address]  [address]  [facility] [place]  [phone]             ADDITIONAL CARE RECOMMENDATIONS:     DIET: Renal Diet    ACTIVITY: Activity as tolerated    WOUND CARE:     EQUIPMENT needed:       DISCHARGE MEDICATIONS:  Current Discharge Medication List      START taking these medications    Details   potassium chloride SR (K-TAB) 20 mEq tablet Take 2 Tabs by mouth daily for 15 days. Qty: 30 Tab, Refills: 0         CONTINUE these medications which have NOT CHANGED    Details   apixaban (ELIQUIS) 5 mg tablet Take 5 mg by mouth two (2) times a day. oxyCODONE IR (ROXICODONE) 5 mg immediate release tablet Take 1 tab in AM and HALF to 1 tab in PM if needed on days where back pain is severe     Qty: 40 Tab, Refills: 0    Associated Diagnoses: Chronic bilateral low back pain without sciatica      azaTHIOprine (IMURAN) 50 mg tablet       carvedilol (COREG) 3.125 mg tablet TK 1 T PO  BID  Refills: 0      magnesium oxide (MAG-OX) 400 mg tablet Take 1 Tab by mouth two (2) times a day for 30 days. Qty: 60 Tab, Refills: 0      albuterol (PROVENTIL HFA, VENTOLIN HFA, PROAIR HFA) 90 mcg/actuation inhaler Take 1-2 Puffs by inhalation every four (4) hours as needed for Wheezing or Shortness of Breath. Qty: 1 Inhaler, Refills: 2      l.acidoph-B.lactis-B.longum (FLORAJEN3) 460 mg (7.5-6- 1.5 bill. cell) cap cap Take 1 Cap by mouth Daily (before breakfast). Qty: 5 Cap, Refills: 0      amLODIPine (NORVASC) 10 mg tablet Take 1 Tab by mouth daily. Qty: 30 Tab, Refills: 5    Associated Diagnoses: Malignant hypertension      metoprolol succinate (TOPROL-XL) 100 mg tablet Take 1 Tab by mouth daily. For blood pressure  Qty: 30 Tab, Refills: 5    Associated Diagnoses: Malignant hypertension      AURYXIA 210 mg iron tablet TK 2 TS PO WITH MEALS  Refills: 12      predniSONE (DELTASONE) 10 mg tablet Take 9 mg by mouth daily (with breakfast).       ergocalciferol (VITAMIN D2) 50,000 unit capsule Take 50,000 Units by mouth every seven (7) days. hydroxychloroquine (PLAQUENIL) 200 mg tablet Take 200 mg by mouth two (2) times a day. ondansetron hcl (ZOFRAN) 8 mg tablet Take 1 Tab by mouth every eight (8) hours as needed. For Nausea  Indications: GASTROPARESIS  Qty: 15 Tab, Refills: 1      allopurinol (ZYLOPRIM) 100 mg tablet Take 100 mg by mouth daily. cyanocobalamin (VITAMIN B-12) 2,500 mcg sublingual tablet Take 1 Tab by mouth daily. Qty: 90 Tab, Refills: 1    Associated Diagnoses: Leukopenia; Low vitamin B12 level; Hypotension due to drugs      pantoprazole (PROTONIX) 40 mg tablet Take 1 Tab by mouth Daily (before breakfast). Qty: 30 Tab, Refills: 0      senna (SENNA) 8.6 mg tablet Take 1 Tab by mouth daily as needed. STOP taking these medications       amoxicillin-clavulanate (AUGMENTIN) 875-125 mg per tablet Comments:   Reason for Stopping:         calcitRIOL (ROCALTROL) 0.25 mcg capsule Comments:   Reason for Stopping:                 NOTIFY YOUR PHYSICIAN FOR ANY OF THE FOLLOWING:   Fever over 101 degrees for 24 hours. Chest pain, shortness of breath, fever, chills, nausea, vomiting, diarrhea, change in mentation, falling, weakness, bleeding. Severe pain or pain not relieved by medications. Or, any other signs or symptoms that you may have questions about.     DISPOSITION:   x Home With:   OT  PT  HH  RN       Long term SNF/Inpatient Rehab    Independent/assisted living    Hospice    Other:       PATIENT CONDITION AT DISCHARGE:     Functional status    Poor    x Deconditioned     Independent      Cognition   x  Lucid     Forgetful     Dementia      Catheters/lines (plus indication)    Franco     PICC     PEG    x None      Code status   x  Full code     DNR      PHYSICAL EXAMINATION AT DISCHARGE:   Refer to Progress Note      CHRONIC MEDICAL DIAGNOSES:  Problem List as of 11/28/2017  Date Reviewed: 11/28/2017          Codes Class Noted - Resolved    Hypomagnesemia ICD-10-CM: M23.90  ICD-9-CM: 275.2 10/30/2017 - Present        Hypocalcemia ICD-10-CM: E83.51  ICD-9-CM: 275.41  10/30/2017 - Present        * (Principal)Hypokalemia ICD-10-CM: E87.6  ICD-9-CM: 276.8  10/27/2017 - Present        Pneumonia ICD-10-CM: J18.9  ICD-9-CM: 257  10/14/2017 - Present        Pleural effusion, left ICD-10-CM: J90  ICD-9-CM: 511.9  10/14/2017 - Present        Hypertension (Chronic) ICD-10-CM: I10  ICD-9-CM: 401.9  10/14/2017 - Present        Chronic bilateral low back pain without sciatica ICD-10-CM: M54.5, G89.29  ICD-9-CM: 724.2, 338.29  5/26/2017 - Present        Anemia of renal disease ICD-10-CM: D63.1  ICD-9-CM: 285.21  2/21/2017 - Present        Dependence on peritoneal dialysis [facility]) ICD-10-CM: Z99.2  ICD-9-CM: V45.11  2/21/2017 - Present        ACP (advance care planning) ICD-10-CM: Z71.89  ICD-9-CM: V65.49  2/21/2017 - Present        ESRD on peritoneal dialysis [facility]) (Chronic) ICD-10-CM: N18.6, Z99.2  ICD-9-CM: 585.6, V45.11  10/7/2016 - Present        Malignant hypertension ICD-10-CM: I10  ICD-9-CM: 401.0  7/11/2016 - Present        Encounter for monitoring opioid maintenance therapy ICD-10-CM: Z51.81, Z79.891  ICD-9-CM: V58.83, V58.69  11/3/2015 - Present        Pulmonary embolism ([facility] ICD-10-CM: I26.99  ICD-9-CM: 415.19  5/8/2013 - Present        Lupus nephritis ([facility] ICD-10-CM: M32.14  ICD-9-CM: 710.0, 583.81  3/10/2012 - Present        Lupus ICD-10-CM: L93.0  ICD-9-CM: 695.4  2/27/2012 - Present        RESOLVED: Idiopathic chronic inflammatory bowel disease ICD-10-CM: K63.89  ICD-9-CM: 558.9  8/28/2013 - 8/28/2013        RESOLVED: Abdominal pain ICD-10-CM: R10.9  ICD-9-CM: 789.00  8/28/2013 - 9/3/2013        RESOLVED: Hypoxia ICD-10-CM: R09.02  ICD-9-CM: 799.02  4/25/2013 - 4/30/2013        RESOLVED: Lupus nephritis ([facility] ICD-10-CM: M32.14  ICD-9-CM: 710.0, 583.81  4/27/2012 - 4/28/2012              Greater than 30  minutes were spent with the patient on counseling and coordination of care    Signed:   [facility] Juancho Currie MD  11/28/2017  12:30 PM

## 2017-11-29 ENCOUNTER — PATIENT OUTREACH (OUTPATIENT)
Dept: FAMILY MEDICINE CLINIC | Age: 32
End: 2017-11-29

## 2017-11-29 NOTE — PROGRESS NOTES
Luis Ball is a 33 y.o. female   This patient was received as a referral from inpatient admission. Admitted to [facility] as re-admission following an admission on 1//27/17-10/30/17 for pneumonia. Inpatient RRAT Score: Undetermined, patient has had 3 in-patient admissions, 2, Ed admissions in 6 months, with 2 comorbities. Is at risk for re-admission. Patient's challenges to self management identified: ESRD on peritoneal   Dialysis , chronic problems with electrolyte imbalance. Medication Management: Verbalized understanding and adherence  Summary of patients top three problems:     Problem 1: ESRD on peritoneal dialysis-11/26/2017  9:42 PM - Joo, Lab In Guided Interventions   Component Results   Component Value Flag Ref Range Units Status   Sodium 139  136 - 145 mmol/L Final   Potassium 2.3 (LL) 3.5 - 5.1 mmol/L Final   Comment:   RESULTS VERIFIED, PHONED TO AND READ BACK BY   JAKE. [email or web address]      Chloride 101  97 - 108 mmol/L Final   CO2 29  21 - 32 mmol/L Final   Anion gap 9  5 - 15 mmol/L Final   Glucose 84  65 - 100 mg/dL Final   BUN 33 (H) 6 - 20 MG/DL Final   Creatinine 8.40 (H) 0.55 - 1.02 MG/DL Final   BUN/Creatinine ratio 4 (L) 12 - 20   Final   GFR est AA 7 (L) >60 ml/min/1.73m2 Final   GFR est non-AA 6 (L) >60 ml/min/1.73m2 Final   Comment:     11/26/2017  9:42 PM - Joo, Lab In Guided Interventions   Component Results   Component Value Flag Ref Range Units Status   Sodium 139  136 - 145 mmol/L Final   Potassium 2.3 (LL) 3.5 - 5.1 mmol/L Final   Comment:   RESULTS VERIFIED, PHONED TO AND READ BACK BY   JAKE. readeo      Chloride 101  97 - 108 mmol/L Final   CO2 29  21 - 32 mmol/L Final   Anion gap 9  5 - 15 mmol/L Final   Glucose 84  65 - 100 mg/dL Final   BUN 33 (H) 6 - 20 MG/DL Final   Creatinine 8.40 (H) 0.55 - 1.02 MG/DL Final   BUN/Creatinine ratio 4 (L) 12 - 20   Final   GFR est AA 7 (L) >60 ml/min/1.73m2 Final   GFR est non-AA 6 (L) >60 ml/min/1.73m2 Final   Comment:     11/26/2017  9:40 PM - Joo, Lab In Shortness of Breath.  l.acidoph-B.lactis-B.longum (FLORAJEN3) 460 mg (7.5-6- 1.5 bill. cell) cap cap Take 1 Cap by mouth Daily (before breakfast).  amLODIPine (NORVASC) 10 mg tablet Take 1 Tab by mouth daily.  AURYXIA 210 mg iron tablet TK 2 TS PO WITH MEALS    predniSONE (DELTASONE) 10 mg tablet Take 9 mg by mouth daily (with breakfast).  ergocalciferol (VITAMIN D2) 50,000 unit capsule Take 50,000 Units by mouth every seven (7) days.  hydroxychloroquine (PLAQUENIL) 200 mg tablet Take 200 mg by mouth two (2) times a day.  ondansetron hcl (ZOFRAN) 8 mg tablet Take 1 Tab by mouth every eight (8) hours as needed. For Nausea  Indications: GASTROPARESIS    allopurinol (ZYLOPRIM) 100 mg tablet Take 100 mg by mouth daily.  cyanocobalamin (VITAMIN B-12) 2,500 mcg sublingual tablet Take 1 Tab by mouth daily.  pantoprazole (PROTONIX) 40 mg tablet Take 1 Tab by mouth Daily (before breakfast).  senna (SENNA) 8.6 mg tablet Take 1 Tab by mouth daily as needed. No current facility-administered medications for this visit. Goals        [facility]            10/19/17- NN did not discuss this with patient. Will plan to discuss with patient during upcoming call. sc       Effective airway clearance r/t to pneumonia (pt-stated)            10/19/17- patient admitted [facility] 10/14/17-10/17/17 for pneumonia. Patient will assess respirations ie shortness of breath. Patient will cough to promote removal of secretions to improve breathing. Patient will increase ambulation to mobilize secretions. sc   10/31/17-       Patient/Family verbalizes understanding of self-management of  disease. (pt-stated)            11/29/17- Patient verbalized understanding of hypokalemia and treatment plan was review with patient PK    How can you care for yourself at home? · If your doctor recommends it, eat foods that have a lot of potassium.  These include fresh fruits, juices, and vegetables. They also include nuts, beans, and milk. · Be safe with medicines. If your doctor prescribes medicines or potassium supplements, take them exactly as directed. Call your doctor if you have any problems with your medicines. · Get your potassium levels tested as often as your doctor tells you.  Prevention of infection (pt-stated)            10/19/17- patient will assess/closely monitor her temperatures  and report fevers greater than 101. Patient will assess hydration status by consuming at least six cups of water to avoid dehydration. Patient will take Levaquin as prescribed. sc  10/31/17- NN instructed patient on importance of taking all medications as ordered, follow-ups as scheduled- PK    Call your doctor now or seek immediate medical care if:  ? · You cough up dark brown or bloody mucus (sputum). ? · You have new or worse trouble breathing. ? · You are dizzy or lightheaded, or you feel like you may faint. ? Watch closely for changes in your health, and be sure to contact your doctor if:  ? · You have a new or higher fever. ? · You are coughing more deeply or more often. ? · You are not getting better after 2 days (48 hours). ? · You do not get better as expected. Patient verbalized understanding of all information discussed. Patient has this Nurse Navigators contact information for any further questions, concerns, or needs.

## 2017-11-29 NOTE — PATIENT INSTRUCTIONS
Hypokalemia: Care Instructions  Your Care Instructions    Hypokalemia (say \"yu-di-dou-GUILLERMO-brenda-uh\") is a low level of potassium. The heart, muscles, kidneys, and nervous system all need potassium to work well. This problem has many different causes. Kidney problems, diet, and medicines like diuretics and laxatives can cause it. So can vomiting or diarrhea. In some cases, cancer is the cause. Your doctor may do tests to find the cause of your low potassium levels. You may need medicines to bring your potassium levels back to normal. You may also need regular blood tests to check your potassium. If you have very low potassium, you may need intravenous (IV) medicines. You also may need tests to check the electrical activity of your heart. Heart problems caused by low potassium levels can be very serious. Follow-up care is a key part of your treatment and safety. Be sure to make and go to all appointments, and call your doctor if you are having problems. It's also a good idea to know your test results and keep a list of the medicines you take. How can you care for yourself at home? · If your doctor recommends it, eat foods that have a lot of potassium. These include fresh fruits, juices, and vegetables. They also include nuts, beans, and milk. · Be safe with medicines. If your doctor prescribes medicines or potassium supplements, take them exactly as directed. Call your doctor if you have any problems with your medicines. · Get your potassium levels tested as often as your doctor tells you. When should you call for help? Call 911 anytime you think you may need emergency care. For example, call if:  ? · You feel like your heart is missing beats. Heart problems caused by low potassium can cause death. ? · You passed out (lost consciousness). ? · You have a seizure. ?Call your doctor now or seek immediate medical care if:  ? · You feel weak or unusually tired. ? · You have severe arm or leg cramps. ? · You have tingling or numbness. ? · You feel sick to your stomach, or you vomit. ? · You have belly cramps. ? · You feel bloated or constipated. ? · You have to urinate a lot. ? · You feel very thirsty most of the time. ? · You are dizzy or lightheaded, or you feel like you may faint. ? · You feel depressed, or you lose touch with reality. ? Watch closely for changes in your health, and be sure to contact your doctor if:  ? · You do not get better as expected. Where can you learn more? Go to http://jorge-rochelle.info/. Enter G358 in the search box to learn more about \"Hypokalemia: Care Instructions. \"  Current as of: May 12, 2017  Content Version: 11.4  © 9547-5675 [employer]. Care instructions adapted under license by [employer] (which disclaims liability or warranty for this information). If you have questions about a medical condition or this instruction, always ask your healthcare professional. [address] any warranty or liability for your use of this information.

## 2017-12-01 LAB
BACTERIA SPEC CULT: ABNORMAL
BACTERIA SPEC CULT: ABNORMAL
GRAM STN SPEC: ABNORMAL
GRAM STN SPEC: ABNORMAL
SERVICE CMNT-IMP: ABNORMAL

## 2017-12-02 LAB
BACTERIA SPEC CULT: NORMAL
SERVICE CMNT-IMP: NORMAL

## 2017-12-04 ENCOUNTER — OFFICE VISIT (OUTPATIENT)
Dept: FAMILY MEDICINE CLINIC | Age: 32
End: 2017-12-04

## 2017-12-04 VITALS
HEIGHT: 65 IN | WEIGHT: 161.6 LBS | TEMPERATURE: 101.5 F | RESPIRATION RATE: 16 BRPM | DIASTOLIC BLOOD PRESSURE: 95 MMHG | BODY MASS INDEX: 26.92 KG/M2 | HEART RATE: 116 BPM | SYSTOLIC BLOOD PRESSURE: 123 MMHG

## 2017-12-04 DIAGNOSIS — J45.41 MODERATE PERSISTENT ASTHMA WITH EXACERBATION: ICD-10-CM

## 2017-12-04 DIAGNOSIS — I10 MALIGNANT HYPERTENSION: ICD-10-CM

## 2017-12-04 DIAGNOSIS — Z99.2 ESRD ON PERITONEAL DIALYSIS (HCC): Primary | Chronic | ICD-10-CM

## 2017-12-04 DIAGNOSIS — R50.9 FEVER, UNSPECIFIED FEVER CAUSE: ICD-10-CM

## 2017-12-04 DIAGNOSIS — N18.6 ESRD ON PERITONEAL DIALYSIS (HCC): Primary | Chronic | ICD-10-CM

## 2017-12-04 LAB
QUICKVUE INFLUENZA TEST: NEGATIVE
VALID INTERNAL CONTROL?: YES

## 2017-12-04 RX ORDER — GEMFIBROZIL 600 MG/1
300 TABLET, FILM COATED ORAL DAILY
COMMUNITY
Start: 2017-11-03 | End: 2022-05-08

## 2017-12-04 RX ORDER — METOPROLOL TARTRATE 50 MG/1
TABLET ORAL
Status: ON HOLD | COMMUNITY
Start: 2017-10-10 | End: 2017-12-13

## 2017-12-04 RX ORDER — LOSARTAN POTASSIUM 50 MG/1
50 TABLET ORAL DAILY
COMMUNITY
Start: 2017-11-03 | End: 2017-12-22

## 2017-12-04 RX ORDER — AZITHROMYCIN 250 MG/1
TABLET, FILM COATED ORAL
Qty: 6 TAB | Refills: 0 | Status: SHIPPED | OUTPATIENT
Start: 2017-12-04 | End: 2017-12-09

## 2017-12-04 NOTE — PATIENT INSTRUCTIONS
Asthma in Adults: Care Instructions  Your Care Instructions    During an asthma attack, your airways swell and narrow as a reaction to certain things (triggers). This makes it hard to breathe. You may be able to prevent asthma attacks if you avoid the things that set off your asthma symptoms. Keeping your asthma under control and treating symptoms before they get bad can help you avoid severe attacks. If you can control your asthma, you may be able to do all of your normal daily activities. You may also avoid asthma attacks and trips to the hospital.  Follow-up care is a key part of your treatment and safety. Be sure to make and go to all appointments, and call your doctor if you are having problems. It's also a good idea to know your test results and keep a list of the medicines you take. How can you care for yourself at home? · Follow your asthma action plan so you can manage your symptoms at home. An asthma action plan will help you prevent and control airway reactions and will tell you what to do during an asthma attack. If you do not have an asthma action plan, work with your doctor to build one. · Take your asthma medicine exactly as prescribed. Medicine plays an important role in controlling asthma. Talk to your doctor right away if you have any questions about what to take and how to take it. ¨ Use your quick-relief medicine when you have symptoms of an attack. Quick-relief medicine often is an albuterol inhaler. Some people need to use quick-relief medicine before they exercise. ¨ Take your controller medicine every day, not just when you have symptoms. Controller medicine is usually an inhaled corticosteroid. The goal is to prevent problems before they occur. Do not use your controller medicine to try to treat an attack that has already started. It does not work fast enough to help. ¨ If your doctor prescribed corticosteroid pills to use during an attack, take them as directed.  They may take hours to work, but they may shorten the attack and help you breathe better. ¨ Keep your quick-relief medicine with you at all times. · Talk to your doctor before using other medicines. Some medicines, such as aspirin, can cause asthma attacks in some people. · Check yourself for asthma symptoms to know which step to follow in your action plan. Watch for things like being short of breath, having chest tightness, coughing, and wheezing. Also notice if symptoms wake you up at night or if you get tired quickly when you exercise. · If you have a peak flow meter, use it to check how well you are breathing. This can help you predict when an asthma attack is going to occur. Then you can take medicine to prevent the asthma attack or make it less severe. · See your doctor regularly. These visits will help you learn more about asthma and what you can do to control it. Your doctor will monitor your treatment to make sure the medicine is helping you. · Keep track of your asthma attacks and your treatment. After you have had an attack, write down what triggered it, what helped end it, and any concerns you have about your asthma action plan. Take your diary when you see your doctor. You can then review your asthma action plan and decide if it is working. · Do not smoke or allow others to smoke around you. Avoid smoky places. Smoking makes asthma worse. If you need help quitting, talk to your doctor about stop-smoking programs and medicines. These can increase your chances of quitting for good. · Learn what triggers an asthma attack for you, and avoid the triggers when you can. Common triggers include colds, smoke, air pollution, dust, pollen, mold, pets, cockroaches, stress, and cold air. · Avoid colds and the flu. Get a pneumococcal vaccine shot. If you have had one before, ask your doctor whether you need a second dose. Get a flu vaccine every fall.  If you must be around people with colds or the flu, wash your hands often.  When should you call for help? Call 911 anytime you think you may need emergency care. For example, call if:  ? · You have severe trouble breathing. ?Call your doctor now or seek immediate medical care if:  ? · Your symptoms do not get better after you have followed your asthma action plan. ? · You cough up yellow, dark brown, or bloody mucus (sputum). ? Watch closely for changes in your health, and be sure to contact your doctor if:  ? · Your coughing and wheezing get worse. ? · You need to use quick-relief medicine on more than 2 days a week (unless it is just for exercise). ? · You need help figuring out what is triggering your asthma attacks. Where can you learn more? Go to http://jorge-rochelle.info/. Enter P597 in the search box to learn more about \"Asthma in Adults: Care Instructions. \"  Current as of: May 12, 2017  Content Version: 11.4  © 9748-1550 Healthwise, [employer]. Care instructions adapted under license by [employer] (which disclaims liability or warranty for this information). If you have questions about a medical condition or this instruction, always ask your healthcare professional. [address] any warranty or liability for your use of this information.

## 2017-12-04 NOTE — PROGRESS NOTES
Chief Complaint   Patient presents with    Follow-up     disease management    Nasal Congestion     runny stuffy nose         1. Have you been to the ER, urgent care clinic since your last visit? Hospitalized since your last visit? Yes- [facility]- 11/26/17- hypokalemia    2. Have you seen or consulted any other health care providers outside of the [address] [place] [address] since your last visit? Include any pap smears or colon screening.  No

## 2017-12-04 NOTE — PROGRESS NOTES
Ms. Jaswinder Marley is a 33y.o. year old female, she is seen today for Transition of Care services following a hospital discharge for volume overload on 11/28/17. Our office Nurse Navigator performed an outreach to Ms. Ron Kim on 11/29/17 (within 2 business days of discharge) to complete medication reconciliation and a telephonic assessment of her condition. Subjective:  Hospital Follow up  Patient seen for follow up of edema and hypokalemia. Patient presented to [facility] ED 11/26/17 with worsening bilateral leg and abdominal swelling. She had run out of PD supplies on 11/24. She was admitted with anasarca, hypokalemia, and severe hypoalbuminemia. Volume overload was treated with peritoneal dialysis and potassium was restored. Patient was discharged on 11/28/17. Follow up scheduled with nephrology for today. Upper Respiratory Infection  Patient complains of symptoms of a URI. Symptoms include congestion and fever. Onset of symptoms was  days ago, unchanged since that time. She also c/o congestion, low grade fever and non productive cough for the past 2 days . She is drinking plenty of fluids. . Evaluation to date: none. Treatment to date: Albuterol from hospital.       Prior to Admission medications    Medication Sig Start Date End Date Taking? Authorizing Provider   gemfibrozil (LOPID) 600 mg tablet  11/3/17  Yes Historical Provider   losartan (COZAAR) 50 mg tablet  11/3/17  Yes Historical Provider   metoprolol tartrate (LOPRESSOR) 50 mg tablet  10/10/17  Yes Historical Provider   azithromycin (ZITHROMAX) 250 mg tablet Take 2 tablets today, then take 1 tablet daily 12/4/17 12/9/17 Yes Governor Yogesh NP   potassium chloride SR (K-TAB) 20 mEq tablet Take 2 Tabs by mouth daily for 15 days. 11/28/17 12/13/17 Yes Garrison Peres MD   carvedilol (COREG) 6.25 mg tablet Take 6.25 mg by mouth two (2) times daily (with meals).    Yes Historical Provider   apixaban (ELIQUIS) 5 mg tablet Take 5 mg by mouth two (2) times a day. Yes Historical Provider   oxyCODONE IR (ROXICODONE) 5 mg immediate release tablet Take 1 tab in AM and HALF to 1 tab in PM if needed on days where back pain is severe    11/10/17  Yes Roberto Liu MD   azaTHIOprine (IMURAN) 50 mg tablet  11/3/17  Yes Historical Provider   albuterol (PROVENTIL HFA, VENTOLIN HFA, PROAIR HFA) 90 mcg/actuation inhaler Take 1-2 Puffs by inhalation every four (4) hours as needed for Wheezing or Shortness of Breath. 10/30/17  Yes Ashley Perez NP   l.acidoph-B.lactis-B.longum (FLORAJEN3) 460 mg (7.5-6- 1.5 bill. cell) cap cap Take 1 Cap by mouth Daily (before breakfast). 10/17/17  Yes Stan Haque MD   amLODIPine (NORVASC) 10 mg tablet Take 1 Tab by mouth daily. 10/7/16  Yes Governor Yogesh NP   AURYXIA 210 mg iron tablet TK 2 TS PO WITH MEALS 7/20/16  Yes Historical Provider   predniSONE (DELTASONE) 10 mg tablet Take 9 mg by mouth daily (with breakfast). Yes Historical Provider   ergocalciferol (VITAMIN D2) 50,000 unit capsule Take 50,000 Units by mouth every seven (7) days. Yes Darnell Franks MD   hydroxychloroquine (PLAQUENIL) 200 mg tablet Take 200 mg by mouth two (2) times a day. Yes Darnell Franks MD   ondansetron hcl (ZOFRAN) 8 mg tablet Take 1 Tab by mouth every eight (8) hours as needed. For Nausea  Indications: GASTROPARESIS 7/14/16  Yes Nestor Tobar MD   allopurinol (ZYLOPRIM) 100 mg tablet Take 100 mg by mouth daily. 10/15/15  Yes Historical Provider   cyanocobalamin (VITAMIN B-12) 2,500 mcg sublingual tablet Take 1 Tab by mouth daily. 10/27/15  Yes Dex Hardy MD   pantoprazole (PROTONIX) 40 mg tablet Take 1 Tab by mouth Daily (before breakfast). 10/23/15  Yes Lucia Zaldivar MD   senna (SENNA) 8.6 mg tablet Take 1 Tab by mouth daily as needed.    Yes Historical Provider          Allergies   Allergen Reactions    Compazine [Prochlorperazine Edisylate] Nausea and Vomiting and Palpitations    Fish Containing Products Rash     An itchy rash appeared in about 1 hour         ROS  See HPI    Objective:   Physical Exam   Constitutional: She is oriented to person, place, and time. She appears well-developed and well-nourished. No distress. HENT:   Right Ear: Tympanic membrane and ear canal normal.   Left Ear: Tympanic membrane and ear canal normal.   Nose: Mucosal edema present. Right sinus exhibits no maxillary sinus tenderness and no frontal sinus tenderness. Left sinus exhibits no maxillary sinus tenderness and no frontal sinus tenderness. Mouth/Throat: Oropharynx is clear and moist.   Cardiovascular: Normal rate, regular rhythm and normal heart sounds. No murmur heard. Pulmonary/Chest: Effort normal and breath sounds normal. She has no decreased breath sounds. She has no wheezes. She has no rhonchi. Lymphadenopathy:     She has no cervical adenopathy. Neurological: She is alert and oriented to person, place, and time. Psychiatric: She has a normal mood and affect. Her behavior is normal.   Nursing note and vitals reviewed. Visit Vitals    BP (!) 123/95 (BP 1 Location: Left arm, BP Patient Position: Sitting)    Pulse (!) 116    Temp (!) 101.5 °F (38.6 °C) (Oral)    Resp 16    Ht 5' 5\" (1.651 m)    Wt 161 lb 9.6 oz (73.3 kg)    BMI 26.89 kg/m2       Assessment & Plan:  Diagnoses and all orders for this visit:    1. ESRD on peritoneal dialysis [facility])  Managed by nephrology. Volume overload resolved. 2. Fever, unspecified fever cause  CXR to rule out pneumonia. Flu negative. Consider viral infection but given patient's comorbidities will cover with Zithromax. Go to ER for new or worsening symptoms  -     AMB POC RAPID INFLUENZA TEST: negative  -     XR CHEST PA LAT; Future  -     azithromycin (ZITHROMAX) 250 mg tablet; Take 2 tablets today, then take 1 tablet daily    3. Moderate persistent asthma with exacerbation  Albuterol PRN. Patient already on steroid therapy. ICS/LABA for persistent symptoms.      4. Malignant hypertension  Stable, no changes to current therapy      I have discussed the diagnosis with the patient and the intended plan as seen in the above orders. The patient has received an after-visit summary along with patient information handout. I have discussed medication side effects and warnings with the patient as well. Follow-up Disposition:  Return in about 3 months (around 3/4/2018) for disease management.         Norah Casillas NP

## 2017-12-07 ENCOUNTER — TELEPHONE (OUTPATIENT)
Dept: FAMILY MEDICINE CLINIC | Age: 32
End: 2017-12-07

## 2017-12-07 ENCOUNTER — OFFICE VISIT (OUTPATIENT)
Dept: SURGERY | Age: 32
End: 2017-12-07

## 2017-12-07 VITALS
TEMPERATURE: 98.1 F | OXYGEN SATURATION: 98 % | BODY MASS INDEX: 24.32 KG/M2 | HEART RATE: 106 BPM | SYSTOLIC BLOOD PRESSURE: 113 MMHG | DIASTOLIC BLOOD PRESSURE: 84 MMHG | HEIGHT: 65 IN | WEIGHT: 146 LBS

## 2017-12-07 DIAGNOSIS — N18.6 ESRD (END STAGE RENAL DISEASE) (HCC): Primary | ICD-10-CM

## 2017-12-07 DIAGNOSIS — R09.89 DECREASED RADIAL PULSE: ICD-10-CM

## 2017-12-07 DIAGNOSIS — Z01.818 PRE-OP TESTING: ICD-10-CM

## 2017-12-07 NOTE — MR AVS SNAPSHOT
Visit Information Date & Time Provider Department Dept. Phone Encounter #  
 12/7/2017 11:20 AM Ruben Garcia MD [facility]  [address] [phone] [id number] Your Appointments 12/8/2017 10:20 AM  
Follow Up with Karon White MD  
[facility]) Appt Note: follow up pain mgt [place] [address] [facility] [address] [facility] [id number] [phone]  
  
   
 Mathias Lucaide  
  
    
 12/8/2017 11:45 AM  
ACUTE CARE with Mark Higuera, KHANH [facility] ([address]) Appt Note: Follow up visit from Neurosurgeon per Dr Nemo Foy [address] [phone]  
[phone]  
  
   
 [address] [address] Upcoming Health Maintenance Date Due DTaP/Tdap/Td series (1 - Tdap) 2/17/2007 Pneumococcal 19-64 Highest Risk (2 of 3 - PCV13) 10/1/2010 PAP AKA CERVICAL CYTOLOGY 4/13/2019 Allergies as of 12/7/2017  Review Complete On: 12/7/2017 By: Susie Bill LPN Severity Noted Reaction Type Reactions Compazine [Prochlorperazine Edisylate] High 07/11/2016   Systemic Nausea and Vomiting, Palpitations Fish Containing Products High 02/21/2017   Side Effect Rash An itchy rash appeared in about 1 hour Current Immunizations  Reviewed on 10/30/2017 Name Date Influenza Vaccine 9/12/2017, 9/9/2012 Influenza Vaccine Split 9/1/2011 ZZZ-RETIRED (DO NOT USE) Pneumococcal Vaccine (Unspecified Type) 10/1/2009 Not reviewed this visit You Were Diagnosed With   
  
 Codes Comments Decreased radial pulse    -  Primary ICD-10-CM: R09.89 ICD-9-CM: 785.9 Pre-op testing     ICD-10-CM: J78.338 ICD-9-CM: V72.84 [facility]) [facility]     ICD-10-CM: N18.6 ICD-9-CM: 515. 6 Vitals BP Pulse Temp Height(growth percentile) Weight(growth percentile) SpO2  
 113/84 (BP 1 Location: Right arm, BP Patient Position: Sitting) (!) 106 98.1 °F (36.7 °C) 5' 5\" (1.651 m) 146 lb (66.2 kg) 98% BMI OB Status Smoking Status 24.3 kg/m2 Medically Induced Never Smoker Vitals History BMI and BSA Data Body Mass Index Body Surface Area  
 24.3 kg/m 2 1.74 m 2 Preferred Pharmacy Pharmacy Name Phone [address], [address] Your Updated Medication List  
  
   
This list is accurate as of: 12/7/17  5:13 PM.  Always use your most recent med list.  
  
  
  
  
 albuterol 90 mcg/actuation inhaler Commonly known as:  PROVENTIL HFA, VENTOLIN HFA, PROAIR HFA Take 1-2 Puffs by inhalation every four (4) hours as needed for Wheezing or Shortness of Breath. allopurinol 100 mg tablet Commonly known as:  Joanie Dakins Take 100 mg by mouth daily. amLODIPine 10 mg tablet Commonly known as:  Ortega Mullet Take 1 Tab by mouth daily. apixaban 5 mg tablet Commonly known as:  Virgel Mary Take 5 mg by mouth two (2) times a day. AURYXIA 210 mg iron tablet Generic drug:  ferric citrate TK 2 TS PO WITH MEALS  
  
 azaTHIOprine 50 mg tablet Commonly known as:  IMURAN  
  
 azithromycin 250 mg tablet Commonly known as:  Mckayla Ax Take 2 tablets today, then take 1 tablet daily COREG 6.25 mg tablet Generic drug:  carvedilol Take 6.25 mg by mouth two (2) times daily (with meals). cyanocobalamin 2,500 mcg sublingual tablet Commonly known as:  VITAMIN B-12 Take 1 Tab by mouth daily. gemfibrozil 600 mg tablet Commonly known as:  LOPID  
  
 l.acidoph-B.lactis-B.longum 460 mg (7.5-6- 1.5 bill. cell) Cap cap Commonly known as:  EFOFGOGX5 Take 1 Cap by mouth Daily (before breakfast). losartan 50 mg tablet Commonly known as:  COZAAR  
  
 metoprolol tartrate 50 mg tablet Commonly known as:  LOPRESSOR  
  
 ondansetron hcl 8 mg tablet Commonly known as:  Donnel Blank Take 1 Tab by mouth every eight (8) hours as needed. For Nausea  Indications: GASTROPARESIS  
  
 oxyCODONE IR 5 mg immediate release tablet Commonly known as:  Chacha Ramal Take 1 tab in AM and HALF to 1 tab in PM if needed on days where back pain is severe  
  
 pantoprazole 40 mg tablet Commonly known as:  PROTONIX Take 1 Tab by mouth Daily (before breakfast). PLAQUENIL 200 mg tablet Generic drug:  hydroxychloroquine Take 200 mg by mouth two (2) times a day. potassium chloride SR 20 mEq tablet Commonly known as:  K-TAB Take 2 Tabs by mouth daily for 15 days. predniSONE 10 mg tablet Commonly known as:  Orlean Aas Take 9 mg by mouth daily (with breakfast). Senna 8.6 mg tablet Generic drug:  senna Take 1 Tab by mouth daily as needed. VITAMIN D2 50,000 unit capsule Generic drug:  ergocalciferol Take 50,000 Units by mouth every seven (7) days. To-Do List   
 12/07/2017 Imaging:  UPPER EXT ART PVR MULT LEVEL SEG PRESSURES   
  
 12/12/2017 1:00 PM  
(Arrive by 12:30 PM) Appointment with VASCULAR ROOM 2 ED [facility]; [facility] VASCULAR LAB ([phone]) Introducing [facility]! [facility] introduces [employer] patient portal. Now you can access parts of your medical record, email your doctor's office, and request medication refills online. 1. In your internet browser, go to [email or web address] 2. Click on the First Time User? Click Here link in the Sign In box. You will see the New Member Sign Up page. 3. Enter your [employer] Access Code exactly as it appears below. You will not need to use this code after youve completed the sign-up process. If you do not sign up before the expiration date, you must request a new code. · [employer] Access Code: [email or web address] Expires: 2/26/2018 12:44 PM 
 
4. Enter the last four digits of your Social Security Number (xxxx) and Date of Birth (mm/dd/yyyy) as indicated and click Submit. You will be taken to the next sign-up page. 5. Create a SnapYeti ID. This will be your SnapYeti login ID and cannot be changed, so think of one that is secure and easy to remember. 6. Create a SnapYeti password. You can change your password at any time. 7. Enter your Password Reset Question and Answer. This can be used at a later time if you forget your password. 8. Enter your e-mail address. You will receive e-mail notification when new information is available in [address] 9. Click Sign Up. You can now view and download portions of your medical record. 10. Click the Download Summary menu link to download a portable copy of your medical information. If you have questions, please visit the Frequently Asked Questions section of the SnapYeti website. Remember, SnapYeti is NOT to be used for urgent needs. For medical emergencies, dial 911. Now available from your iPhone and Android! Please provide this summary of care documentation to your next provider. Your primary care clinician is listed as Reese Garcia. If you have any questions after today's visit, please call [phone].

## 2017-12-07 NOTE — TELEPHONE ENCOUNTER
Dr. Edgar Lee in office and received phone call from surgeon that patient was there earlier for pre-op apt. She is not doing well and will need to see us . Dr. Edgar Lee wants patient to be seen by Devendra Zamudio tomorrow. Apt. Given for 12/08/17 at 11:45 am. Tried to reach patient at 2 pm at home # , mail box is full. Called at 2:05 pm and left detailed massage on cell #  that we received call from her surgeon and she needs to be seen . Given info. About apt. Tomorrow. Asked to cb to confirm that she received message and that she can  make apt.

## 2017-12-07 NOTE — PROGRESS NOTES
The patient is a 33 y.o. woman referred by Dr. Jair Perry regarding hemodialysis access. The patient is presently dialyzed by way peritoneal dialysis catheter. She had an episode of severe edema related to failing to obtain supplies for PD. She had modification of her peritoneal dialysis and now reports that she does not have edema. The patient was hospitalized about 6 weeks ago for pneumonia. The patient reports today that she has a cough and feels wheezy. The patient saw Larissa Hamlin NP, her primary provider, on 12/4/17 and started on Zithromax for an upper respiratory infection. The patient reports that she does have some shortness of breath when she walks, but no shortness of breath at rest.     The patient's end-stage renal disease is related to systemic lupus. Past medical history also includes asthma, degenerative joint disease lumbar, congestive heart failure, hypercholesterolemia, hypertension, pulmonary embolus 2013. The patient reports she has been on anticoagulation since 2013. She reports that that pulmonary embolism was the only thrombotic episode she has had. The patient denies anginal chest pain or irregular heart beat. Physical Examination:   GENERAL:  Alert woman in no acute distress. Visit Vitals    /84 (BP 1 Location: Right arm, BP Patient Position: Sitting)    Pulse (!) 106    Temp 98.1 °F (36.7 °C)    Ht 5' 5\" (1.651 m)    Wt 66.2 kg (146 lb)    SpO2 98%    BMI 24.3 kg/m2   HEAD/NECK: No jaundice, mass or thyromegaly. LUNGS: Exam reveals extensive rhonchi throughout the right and left sides except there are reduced breath sounds in the right lower posterior. NEURO:  Patient is alert and oriented and moves all extremities equally. Facial movement is symmetrical. Speech was normal.    Examination of the upper extremities revealed on the right 1-2+ radial pulse and 1+ ulnar pulse. On the left there is 1-2+ radial pulse and 1+ ulnar pulse.  In each hand, the palmar arch appears to be incomplete to doppler exam.     Duplex ultrasound vein mapping was carried out for dialysis access. On the left side, median antebrachial vein is 2.4 mm diameter. This derives from cephalic. Outflow is mainly into the deep system. No cephalic is seen in the left upper arm. On the left, the basilic vein at the elbow and just below is 3.0 mm in diameter. The basilic vein remains soft and compressible on the left. It joins the brachial vein 2/3's of the way up where it is 2.5 mm in diameter. Most proximally, it is 3.0 mm in diameter. On the right, median antebrachial vein is not seen. Cephalic vein is not seen in the upper arm on the right. Basilic vein on the right is 2.2 mm in diameter distally. It joins the brachial vein 2/3's of the way up where the combined vein is 2.4 mm in diameter. Most proximally on the right, the brachial vein is 2.9 mm in diameter. Because of the incomplete palmar arches and history of lupus together with relatively small native vessels, I will arrange for the patient to have bilateral upper extremity arterial doppler studies to confirm satisfactory arterial inflow. As long as that study is satisfactory, at the present time I would plan for creation of an arteriovenous fistula on the left between brachial artery and basilic vein. This would then be later transposed once the outflow vein had dilated sufficiently. At the office today, the patient appeared weak and lung exam was clearly abnormal. I spoke with Dr. Ailyn Alfonso as the patient's nurse practitioner was not working at that office today. Dr. Ailyn Alfonso will be seeing the patient tomorrow in the office to follow up regarding the respiratory status. I will await results from the noninvasive arterial studies in the upper extremities and confirm that the patient appears strong enough before proceeding with arteriovenous fistula surgery in left upper extremity.     I did review with the patient the risks vs benefits of creation of arteriovenous fistula. She understands that this surgery will be the first of a two-part process. Risks versus benefits were reviewed with the patient. Risks of surgery include bleeding, infection, failure of the fistula, ischemia of the hand, swelling of the arm, injury to vessels or nerves, risk of anesthesia, etc. The patient understands and wishes to proceed. Final Diagnosis:  End-stage renal disease, duplex ultrasound vein mapping upper extremities. MedDATA/valdez     cc: JOY Jimenes MD

## 2017-12-08 ENCOUNTER — OFFICE VISIT (OUTPATIENT)
Dept: NEUROLOGY | Age: 32
End: 2017-12-08

## 2017-12-08 ENCOUNTER — OFFICE VISIT (OUTPATIENT)
Dept: FAMILY MEDICINE CLINIC | Age: 32
End: 2017-12-08

## 2017-12-08 VITALS
OXYGEN SATURATION: 97 % | HEIGHT: 65 IN | DIASTOLIC BLOOD PRESSURE: 83 MMHG | BODY MASS INDEX: 23.93 KG/M2 | RESPIRATION RATE: 16 BRPM | SYSTOLIC BLOOD PRESSURE: 106 MMHG | HEART RATE: 115 BPM | WEIGHT: 143.6 LBS | TEMPERATURE: 98.3 F

## 2017-12-08 VITALS
BODY MASS INDEX: 23.82 KG/M2 | DIASTOLIC BLOOD PRESSURE: 78 MMHG | HEIGHT: 65 IN | WEIGHT: 143 LBS | SYSTOLIC BLOOD PRESSURE: 104 MMHG

## 2017-12-08 DIAGNOSIS — M54.50 CHRONIC BILATERAL LOW BACK PAIN WITHOUT SCIATICA: Primary | ICD-10-CM

## 2017-12-08 DIAGNOSIS — J21.9 ACUTE BRONCHIOLITIS WITH BRONCHOSPASM: Primary | ICD-10-CM

## 2017-12-08 DIAGNOSIS — G89.29 CHRONIC BILATERAL LOW BACK PAIN WITHOUT SCIATICA: Primary | ICD-10-CM

## 2017-12-08 DIAGNOSIS — J45.21 MILD INTERMITTENT ASTHMA WITH ACUTE EXACERBATION: ICD-10-CM

## 2017-12-08 PROBLEM — Z86.711 HISTORY OF PULMONARY EMBOLISM: Status: ACTIVE | Noted: 2017-12-08

## 2017-12-08 PROBLEM — J18.9 PNEUMONIA: Status: RESOLVED | Noted: 2017-10-14 | Resolved: 2017-12-08

## 2017-12-08 PROBLEM — J90 PLEURAL EFFUSION, LEFT: Status: RESOLVED | Noted: 2017-10-14 | Resolved: 2017-12-08

## 2017-12-08 RX ORDER — OXYCODONE HYDROCHLORIDE 5 MG/1
TABLET ORAL
Qty: 40 TAB | Refills: 0 | Status: SHIPPED | OUTPATIENT
Start: 2017-12-08 | End: 2017-12-22

## 2017-12-08 RX ORDER — LEVALBUTEROL INHALATION SOLUTION 1.25 MG/3ML
1.25 SOLUTION RESPIRATORY (INHALATION)
Qty: 3 ML | Refills: 0
Start: 2017-12-08 | End: 2017-12-08

## 2017-12-08 RX ORDER — FLUTICASONE FUROATE AND VILANTEROL 200; 25 UG/1; UG/1
1 POWDER RESPIRATORY (INHALATION) DAILY
Qty: 1 INHALER | Refills: 5 | Status: SHIPPED | OUTPATIENT
Start: 2017-12-08 | End: 2018-09-10

## 2017-12-08 NOTE — PROGRESS NOTES
Pill count = 0 oxycodone tabs       Pill count verified with patient. UDS obtained and sent =  Pain contract = on file   made available for MD review.

## 2017-12-08 NOTE — PROGRESS NOTES
Marybeth Augustine is a 33 y.o. female who was seen in clinic today (12/8/2017). Subjective:  Bronchitis  Patient seen for follow up of bronchitis. Symptoms include congestion and cough. Onset of symptoms was 7 days ago, unchanged since that time. She also c/o congestion, low grade fever and non productive cough for the past 7 days. Reports some SOB and wheezing. She is drinking plenty of fluids. . Evaluation to date: seen on 12/4/17, CXR negative. Treatment to date: Albuterol and Zithromax. Patient has a h/o pulmonary embolism. Reports compliance with Eliquis. Previous PFTs (2016) showed volume obstructive and restrictive patterns. Prior to Admission medications    Medication Sig Start Date End Date Taking? Authorizing Provider   oxyCODONE IR (ROXICODONE) 5 mg immediate release tablet Take 1 tab in AM and HALF to 1 tab in PM if needed on days where back pain is severe    12/8/17  Yes Sanju Adam MD   levalbuterol (XOPENEX) 1.25 mg/3 mL nebu 3 mL by Nebulization route now for 1 dose. 12/8/17 12/8/17 Yes Sonia Orellana NP   fluticasone-vilanterol (BREO ELLIPTA) 200-25 mcg/dose inhaler Take 1 Puff by inhalation daily. Rinse mouth out after use 12/8/17  Yes Sonia Orellana NP   gemfibrozil (LOPID) 600 mg tablet  11/3/17  Yes Historical Provider   losartan (COZAAR) 50 mg tablet  11/3/17  Yes Historical Provider   metoprolol tartrate (LOPRESSOR) 50 mg tablet  10/10/17  Yes Historical Provider   azithromycin (ZITHROMAX) 250 mg tablet Take 2 tablets today, then take 1 tablet daily 12/4/17 12/9/17 Yes Sonia Orellana NP   potassium chloride SR (K-TAB) 20 mEq tablet Take 2 Tabs by mouth daily for 15 days. 11/28/17 12/13/17 Yes Juliette Monsivais MD   carvedilol (COREG) 6.25 mg tablet Take 6.25 mg by mouth two (2) times daily (with meals). Yes Historical Provider   apixaban (ELIQUIS) 5 mg tablet Take 5 mg by mouth two (2) times a day.    Yes Historical Provider   azaTHIOprine (IMURAN) 50 mg tablet  11/3/17  Yes Historical Provider   albuterol (PROVENTIL HFA, VENTOLIN HFA, PROAIR HFA) 90 mcg/actuation inhaler Take 1-2 Puffs by inhalation every four (4) hours as needed for Wheezing or Shortness of Breath. 10/30/17  Yes KHANH Zayasoph-B.lactis-B.longum (FLORAJEN3) 460 mg (7.5-6- 1.5 bill. cell) cap cap Take 1 Cap by mouth Daily (before breakfast). 10/17/17  Yes Maxim Aguila MD   amLODIPine (NORVASC) 10 mg tablet Take 1 Tab by mouth daily. 10/7/16  Yes Nader Sharma NP   AURYXIA 210 mg iron tablet TK 2 TS PO WITH MEALS 7/20/16  Yes Historical Provider   predniSONE (DELTASONE) 10 mg tablet Take 9 mg by mouth daily (with breakfast). Yes Historical Provider   ergocalciferol (VITAMIN D2) 50,000 unit capsule Take 50,000 Units by mouth every seven (7) days. Yes Darnell Franks MD   hydroxychloroquine (PLAQUENIL) 200 mg tablet Take 200 mg by mouth two (2) times a day. Yes Darnell Franks MD   ondansetron hcl (ZOFRAN) 8 mg tablet Take 1 Tab by mouth every eight (8) hours as needed. For Nausea  Indications: GASTROPARESIS 7/14/16  Yes Mckayla Silvestre MD   allopurinol (ZYLOPRIM) 100 mg tablet Take 100 mg by mouth daily. 10/15/15  Yes Historical Provider   cyanocobalamin (VITAMIN B-12) 2,500 mcg sublingual tablet Take 1 Tab by mouth daily. 10/27/15  Yes Symone Donnelly MD   pantoprazole (PROTONIX) 40 mg tablet Take 1 Tab by mouth Daily (before breakfast). 10/23/15  Yes Claudean Schaumann, MD   senna (SENNA) 8.6 mg tablet Take 1 Tab by mouth daily as needed. Yes Historical Provider          Allergies   Allergen Reactions    Compazine [Prochlorperazine Edisylate] Nausea and Vomiting and Palpitations    Fish Containing Products Rash     An itchy rash appeared in about 1 hour           ROS  See HPI    Objective:   Physical Exam   Constitutional: She is oriented to person, place, and time. She appears well-developed and well-nourished. No distress.    HENT:   Right Ear: Tympanic membrane and ear canal normal.   Left Ear: Tympanic membrane and ear canal normal.   Nose: Mucosal edema present. Right sinus exhibits no maxillary sinus tenderness and no frontal sinus tenderness. Left sinus exhibits no maxillary sinus tenderness and no frontal sinus tenderness. Mouth/Throat: Oropharynx is clear and moist.   Cardiovascular: Regular rhythm and normal heart sounds. Tachycardia present. No murmur heard. Pulmonary/Chest: Effort normal. She has no decreased breath sounds. She has wheezes (inspiratory of left upper lobe). She has no rhonchi. Lymphadenopathy:     She has no cervical adenopathy. Neurological: She is alert and oriented to person, place, and time. Psychiatric: She has a normal mood and affect. Her behavior is normal.   Nursing note and vitals reviewed. Visit Vitals    /83 (BP 1 Location: Right arm, BP Patient Position: Sitting)    Pulse (!) 115    Temp 98.3 °F (36.8 °C) (Oral)    Resp 16    Ht 5' 5\" (1.651 m)    Wt 143 lb 9.6 oz (65.1 kg)    SpO2 97%    BMI 23.9 kg/m2       Assessment & Plan:  Diagnoses and all orders for this visit:    1. Acute bronchiolitis with bronchospasm  Wheezing improved with nebulizer treatment. Begin LABA/ICS combo. PFTs for continued symptoms.   -     LEVALBUTEROL, INHAL. SOL., FDA-APPROVED FINAL, NON-COMPOUND UNIT DOSE, 0.5 MG  -     levalbuterol (XOPENEX) 1.25 mg/3 mL nebu; 3 mL by Nebulization route now for 1 dose. -     WA INHAL RX, AIRWAY OBST/DX SPUTUM INDUCT  -     fluticasone-vilanterol (BREO ELLIPTA) 200-25 mcg/dose inhaler; Take 1 Puff by inhalation daily. Rinse mouth out after use    I have discussed the diagnosis with the patient and the intended plan as seen in the above orders. The patient has received an after-visit summary along with patient information handout. I have discussed medication side effects and warnings with the patient as well.     Follow-up Disposition: Not on [address], [place]

## 2017-12-08 NOTE — MR AVS SNAPSHOT
Visit Information Date & Time Provider Department Dept. Phone Encounter #  
 12/8/2017 10:20 AM Parul Garay MD [facility] Neurology [facility] [phone] [id number] Follow-up Instructions Return in about 4 weeks (around 1/5/2018). Your Appointments 12/8/2017 11:45 AM  
ACUTE CARE with Marjorie Stringer  [facility] ([facility]) Appt Note: Follow up visit from Neurosurgeon per Dr Nathan Yi [address] [phone]  
[phone]  
  
   
 [phone] [phone]  
  
    
 1/5/2018 11:40 AM  
Follow Up with Parul Garay MD  
[facility]) Appt Note: follow up pain mgt sck [address] [facility] [address] [facility] [id number] [phone]  
  
   
 [address] [place] [address] [address] [address] Upcoming Health Maintenance Date Due DTaP/Tdap/Td series (1 - Tdap) 2/17/2007 Pneumococcal 19-64 Highest Risk (2 of 3 - PCV13) 10/1/2010 PAP AKA CERVICAL CYTOLOGY 4/13/2019 Allergies as of 12/8/2017  Review Complete On: 12/7/2017 By: Teodoro Smith LPN Severity Noted Reaction Type Reactions Compazine [Prochlorperazine Edisylate] High 07/11/2016   Systemic Nausea and Vomiting, Palpitations Fish Containing Products High 02/21/2017   Side Effect Rash An itchy rash appeared in about 1 hour Current Immunizations  Reviewed on 10/30/2017 Name Date Influenza Vaccine 9/12/2017, 9/9/2012 Influenza Vaccine Split 9/1/2011 ZZZ-RETIRED (DO NOT USE) Pneumococcal Vaccine (Unspecified Type) 10/1/2009 Not reviewed this visit You Were Diagnosed With   
  
 Codes Comments Chronic bilateral low back pain without sciatica    -  Primary ICD-10-CM: M54.5, G89.29 ICD-9-CM: 724.2, 338.29 Vitals  BP Height(growth percentile) Weight(growth percentile) BMI OB Status Smoking Status 104/78 (BP 1 Location: Left arm, BP Patient Position: Sitting) 5' 5\" (1.651 m) 143 lb (64.9 kg) 23.8 kg/m2 Medically Induced Never Smoker BMI and BSA Data Body Mass Index Body Surface Area  
 23.8 kg/m 2 1.73 m 2 Preferred Pharmacy Pharmacy Name Phone [address], [address] Your Updated Medication List  
  
   
This list is accurate as of: 12/8/17 11:24 AM.  Always use your most recent med list.  
  
  
  
  
 albuterol 90 mcg/actuation inhaler Commonly known as:  PROVENTIL HFA, VENTOLIN HFA, PROAIR HFA Take 1-2 Puffs by inhalation every four (4) hours as needed for Wheezing or Shortness of Breath. allopurinol 100 mg tablet Commonly known as:  Margy Wesley Take 100 mg by mouth daily. amLODIPine 10 mg tablet Commonly known as:  Petey Kapoor Take 1 Tab by mouth daily. apixaban 5 mg tablet Commonly known as:  Payton Zarate Take 5 mg by mouth two (2) times a day. AURYXIA 210 mg iron tablet Generic drug:  ferric citrate TK 2 TS PO WITH MEALS  
  
 azaTHIOprine 50 mg tablet Commonly known as:  IMURAN  
  
 azithromycin 250 mg tablet Commonly known as:  Luz Maria Pickup Take 2 tablets today, then take 1 tablet daily COREG 6.25 mg tablet Generic drug:  carvedilol Take 6.25 mg by mouth two (2) times daily (with meals). cyanocobalamin 2,500 mcg sublingual tablet Commonly known as:  VITAMIN B-12 Take 1 Tab by mouth daily. gemfibrozil 600 mg tablet Commonly known as:  LOPID  
  
 l.acidoph-B.lactis-B.longum 460 mg (7.5-6- 1.5 bill. cell) Cap cap Commonly known as:  YQMYBIAH4 Take 1 Cap by mouth Daily (before breakfast). losartan 50 mg tablet Commonly known as:  COZAAR  
  
 metoprolol tartrate 50 mg tablet Commonly known as:  LOPRESSOR  
  
 ondansetron hcl 8 mg tablet Commonly known as:  Leopoldo Marus  
 Take 1 Tab by mouth every eight (8) hours as needed. For Nausea  Indications: GASTROPARESIS  
  
 oxyCODONE IR 5 mg immediate release tablet Commonly known as:  Dorthea Mccreedy Take 1 tab in AM and HALF to 1 tab in PM if needed on days where back pain is severe  
  
 pantoprazole 40 mg tablet Commonly known as:  PROTONIX Take 1 Tab by mouth Daily (before breakfast). PLAQUENIL 200 mg tablet Generic drug:  hydroxychloroquine Take 200 mg by mouth two (2) times a day. potassium chloride SR 20 mEq tablet Commonly known as:  K-TAB Take 2 Tabs by mouth daily for 15 days. predniSONE 10 mg tablet Commonly known as:  Reino Clancy Take 9 mg by mouth daily (with breakfast). Senna 8.6 mg tablet Generic drug:  senna Take 1 Tab by mouth daily as needed. VITAMIN D2 50,000 unit capsule Generic drug:  ergocalciferol Take 50,000 Units by mouth every seven (7) days. Prescriptions Printed Refills  
 oxyCODONE IR (ROXICODONE) 5 mg immediate release tablet 0 Sig: Take 1 tab in AM and HALF to 1 tab in PM if needed on days where back pain is severe 
   
 Class: Print Follow-up Instructions Return in about 4 weeks (around 1/5/2018). To-Do List   
 12/12/2017  1:00 PM  
(Arrive by 12:30 PM) Appointment with VASCULAR ROOM 2 [id number]; [facility] V[facility]ULAR LAB ([phone]) Introducing [facility] [place] [facility]! [employer] introduces [facility] patient portal. Now you can access parts of your medical record, email your doctor's office, and request medication refills online. 1. In your internet browser, go to [email or web address] 2. Click on the First Time User? Click Here link in the Sign In box. You will see the New Member Sign Up page. 3. Enter your [facility] Access Code exactly as it appears below. You will not need to use this code after youve completed the sign-up process.  If you do not sign up before the expiration date, you must request a new code. · [facility] Access Code: [email or web address] Expires: 2/26/2018 12:44 PM 
 
4. Enter the last four digits of your Social Security Number (xxxx) and Date of Birth (mm/dd/yyyy) as indicated and click Submit. You will be taken to the next sign-up page. 5. Create a [facility] ID. This will be your [facility] login ID and cannot be changed, so think of one that is secure and easy to remember. 6. Create a [facility] password. You can change your password at any time. 7. Enter your Password Reset Question and Answer. This can be used at a later time if you forget your password. 8. Enter your e-mail address. You will receive e-mail notification when new information is available in [address] 9. Click Sign Up. You can now view and download portions of your medical record. 10. Click the Download Summary menu link to download a portable copy of your medical information. If you have questions, please visit the Frequently Asked Questions section of the [facility] website. Remember, [facility] is NOT to be used for urgent needs. For medical emergencies, dial 911. Now available from your iPhone and Android! Please provide this summary of care documentation to your next provider. Your primary care clinician is listed as Caren Eric. If you have any questions after today's visit, please call [phone].

## 2017-12-08 NOTE — PATIENT INSTRUCTIONS
Bronchitis: Care Instructions  Your Care Instructions    Bronchitis is inflammation of the bronchial tubes, which carry air to the lungs. The tubes swell and produce mucus, or phlegm. The mucus and inflamed bronchial tubes make you cough. You may have trouble breathing. Most cases of bronchitis are caused by viruses like those that cause colds. Antibiotics usually do not help and they may be harmful. Bronchitis usually develops rapidly and lasts about 2 to 3 weeks in otherwise healthy people. Follow-up care is a key part of your treatment and safety. Be sure to make and go to all appointments, and call your doctor if you are having problems. It's also a good idea to know your test results and keep a list of the medicines you take. How can you care for yourself at home? · Take all medicines exactly as prescribed. Call your doctor if you think you are having a problem with your medicine. · Get some extra rest.  · Take an over-the-counter pain medicine, such as acetaminophen (Tylenol), ibuprofen (Advil, Motrin), or naproxen (Aleve) to reduce fever and relieve body aches. Read and follow all instructions on the label. · Do not take two or more pain medicines at the same time unless the doctor told you to. Many pain medicines have acetaminophen, which is Tylenol. Too much acetaminophen (Tylenol) can be harmful. · Take an over-the-counter cough medicine that contains dextromethorphan to help quiet a dry, hacking cough so that you can sleep. Avoid cough medicines that have more than one active ingredient. Read and follow all instructions on the label. · Breathe moist air from a humidifier, hot shower, or sink filled with hot water. The heat and moisture will thin mucus so you can cough it out. · Do not smoke. Smoking can make bronchitis worse. If you need help quitting, talk to your doctor about stop-smoking programs and medicines. These can increase your chances of quitting for good.   When should you call for help? Call 911 anytime you think you may need emergency care. For example, call if:  ? · You have severe trouble breathing. ?Call your doctor now or seek immediate medical care if:  ? · You have new or worse trouble breathing. ? · You cough up dark brown or bloody mucus (sputum). ? · You have a new or higher fever. ? · You have a new rash. ? Watch closely for changes in your health, and be sure to contact your doctor if:  ? · You cough more deeply or more often, especially if you notice more mucus or a change in the color of your mucus. ? · You are not getting better as expected. Where can you learn more? Go to http://jorge-rochelle.info/. Enter H333 in the search box to learn more about \"Bronchitis: Care Instructions. \"  Current as of: May 12, 2017  Content Version: 11.4  © 7825-3966 [employer]. Care instructions adapted under license by [employer] (which disclaims liability or warranty for this information). If you have questions about a medical condition or this instruction, always ask your healthcare professional. [address] any warranty or liability for your use of this information.

## 2017-12-08 NOTE — PROGRESS NOTES
Chief Complaint   Patient presents with    Follow-up     patient stated her surgeon recommended that she follow up with provider for concerns about rattling noise in chest       1. Have you been to the ER, urgent care clinic since your last visit? Hospitalized since your last visit? No    2. Have you seen or consulted any other health care providers outside of the [address] [place] [address] since your last visit? Include any pap smears or colon screening.  No

## 2017-12-08 NOTE — PROGRESS NOTES
Interval HPI:     This is a 33 y.o. female who is following up for     PMHx: chronic back pain, hx of fibromyalgia, hx of DVT/ coumadin, hx of Lupus, mild lower lumbar disc degeneration (L4-5 and L5-S1 with small annular tear at L5-S1)    Chief Complaint   Patient presents with    Pain (Chronic)       Continues to have moderate to severe lower back pain. Pain generally worse since last visit d/t pt being hospitalized twice for fluid accumulation secondary to ESRD. Wasn't able to do serum drug screen due to recent illness and hasn't been able to urinate consistently to provide UDS due to ESRD. Says when at home, she's generally had to stay in bed and back hurting more. Reports that she takes one Oxycodone IR 5 mg tab when she wakes up and maybe 2-3 days a week will take an extra tablet. Rates average daily pain as 3-5/ 10 with her medications, some days up to 8/ 10. Tried/ failed: Gabapentin, Cymbalta, Amitriptyline (only helped get to sleep), Lyrica (abnormal behaviors)    Reviewed : only filling pain Rx from my office, no aberrant behavior. Last filled Oxycodone on 11-11-17 (#40 tabs)  Pill count: 0 Oxycodone IR 5 mg tablets left    UDS Hx:  March 2017: consistent with Rx pain medication  9-13-17 UDS: only screened for codeine or morphine, which pt doesn't take      Brief ROS: as above  There have been no significant changes in PMHx, PSHx, SHx except as noted above.      Allergies   Allergen Reactions    Compazine [Prochlorperazine Edisylate] Nausea and Vomiting and Palpitations    Fish Containing Products Rash     An itchy rash appeared in about 1 hour     Current Outpatient Prescriptions   Medication Sig Dispense Refill    oxyCODONE IR (ROXICODONE) 5 mg immediate release tablet Take 1 tab in AM and HALF to 1 tab in PM if needed on days where back pain is severe    40 Tab 0    gemfibrozil (LOPID) 600 mg tablet       losartan (COZAAR) 50 mg tablet       metoprolol tartrate (LOPRESSOR) 50 mg tablet       azithromycin (ZITHROMAX) 250 mg tablet Take 2 tablets today, then take 1 tablet daily 6 Tab 0    potassium chloride SR (K-TAB) 20 mEq tablet Take 2 Tabs by mouth daily for 15 days. 30 Tab 0    carvedilol (COREG) 6.25 mg tablet Take 6.25 mg by mouth two (2) times daily (with meals).  apixaban (ELIQUIS) 5 mg tablet Take 5 mg by mouth two (2) times a day.  azaTHIOprine (IMURAN) 50 mg tablet       albuterol (PROVENTIL HFA, VENTOLIN HFA, PROAIR HFA) 90 mcg/actuation inhaler Take 1-2 Puffs by inhalation every four (4) hours as needed for Wheezing or Shortness of Breath. 1 Inhaler 2    l.acidoph-B.lactis-B.longum (FLORAJEN3) 460 mg (7.5-6- 1.5 bill. cell) cap cap Take 1 Cap by mouth Daily (before breakfast). 5 Cap 0    amLODIPine (NORVASC) 10 mg tablet Take 1 Tab by mouth daily. 30 Tab 5    AURYXIA 210 mg iron tablet TK 2 TS PO WITH MEALS  12    predniSONE (DELTASONE) 10 mg tablet Take 9 mg by mouth daily (with breakfast).  ergocalciferol (VITAMIN D2) 50,000 unit capsule Take 50,000 Units by mouth every seven (7) days.  hydroxychloroquine (PLAQUENIL) 200 mg tablet Take 200 mg by mouth two (2) times a day.  ondansetron hcl (ZOFRAN) 8 mg tablet Take 1 Tab by mouth every eight (8) hours as needed. For Nausea  Indications: GASTROPARESIS 15 Tab 1    allopurinol (ZYLOPRIM) 100 mg tablet Take 100 mg by mouth daily.  cyanocobalamin (VITAMIN B-12) 2,500 mcg sublingual tablet Take 1 Tab by mouth daily. 90 Tab 1    pantoprazole (PROTONIX) 40 mg tablet Take 1 Tab by mouth Daily (before breakfast). 30 Tab 0    senna (SENNA) 8.6 mg tablet Take 1 Tab by mouth daily as needed.          Physical Exam  Vitals:    12/08/17 1040   BP: 104/78   BP 1 Location: Left arm   BP Patient Position: Sitting   Weight: 64.9 kg (143 lb)   Height: 5' 5\" (1.651 m)       Resting, NAD, appears very fatigued  Awake, alert and conversant     Focused Neurological Exam     Mental status:   Alert and oriented to person, place situation  Mood appears stable  Normal thought processes    CNs: EOMI, Face symmetric, Hearing/ Language normal  Sensory: intact light touch in both legs  Motor: 4/ 5 strength in arms and legs    Reflexes: 1+ patellars  Gait: not observed    Impression    ICD-10-CM ICD-9-CM    1. Chronic bilateral low back pain without sciatica M54.5 724.2 oxyCODONE IR (ROXICODONE) 5 mg immediate release tablet    G89.29 338.29           Renewed Rx Oxycodone IR 5 mg tablet (#40 tabs) to take 1 tab in AM and occasionally HALF to one tab in evenings if pain is severe. Pt unable to urinate to provide UDS today. Will attempt to get mouth swab/ drug screen test kit in office to have at next visit. Pt to see PCP later this AM due to feeing ill.

## 2017-12-08 NOTE — MR AVS SNAPSHOT
Visit Information Date & Time Provider Department Dept. Phone Encounter #  
 12/8/2017 11:45 AM Carlito Clarke  [facility] [phone] [id number] Your Appointments 1/3/2018  3:00 PM  
Follow Up with Trey Carver MD  
[facility]) Appt Note: follow up pain mgt [address] [place] [place] [address] [phone]  
  
   
 [address] [place] [id number] [address] Upcoming Health Maintenance Date Due DTaP/Tdap/Td series (1 - Tdap) 2/17/2007 Pneumococcal 19-64 Highest Risk (2 of 3 - PCV13) 10/1/2010 PAP AKA CERVICAL CYTOLOGY 4/13/2019 Allergies as of 12/8/2017  Review Complete On: 12/8/2017 By: Carlito Clarke NP Severity Noted Reaction Type Reactions Compazine [Prochlorperazine Edisylate] High 07/11/2016   Systemic Nausea and Vomiting, Palpitations Fish Containing Products High 02/21/2017   Side Effect Rash An itchy rash appeared in about 1 hour Current Immunizations  Reviewed on 10/30/2017 Name Date Influenza Vaccine 9/12/2017, 9/9/2012 Influenza Vaccine Split 9/1/2011 ZZZ-RETIRED (DO NOT USE) Pneumococcal Vaccine (Unspecified Type) 10/1/2009 Not reviewed this visit You Were Diagnosed With   
  
 Codes Comments Acute bronchiolitis with bronchospasm    -  Primary ICD-10-CM: J21.9 ICD-9-CM: 466.19 Tachycardia     ICD-10-CM: R00.0 ICD-9-CM: 890. 0 Shortness of breath     ICD-10-CM: R06.02 
ICD-9-CM: 786.05 History of pulmonary embolism     ICD-10-CM: Z86.711 ICD-9-CM: V12.55 Vitals BP Pulse Temp Resp Height(growth percentile) Weight(growth percentile) 106/83 (BP 1 Location: Right arm, BP Patient Position: Sitting) (!) 135 98.3 °F (36.8 °C) (Oral) 16 5' 5\" (1.651 m) 143 lb 9.6 oz (65.1 kg) SpO2 BMI OB Status Smoking Status 97% 23.9 kg/m2 Medically Induced Never Smoker Vitals History BMI and BSA Data Body Mass Index Body Surface Area  
 23.9 kg/m 2 1.73 m 2 Preferred Pharmacy Pharmacy Name Phone [address], [address] Your Updated Medication List  
  
   
This list is accurate as of: 12/8/17  1:00 PM.  Always use your most recent med list.  
  
  
  
  
 albuterol 90 mcg/actuation inhaler Commonly known as:  PROVENTIL HFA, VENTOLIN HFA, PROAIR HFA Take 1-2 Puffs by inhalation every four (4) hours as needed for Wheezing or Shortness of Breath. allopurinol 100 mg tablet Commonly known as:  Dimondalesabas Knottman Take 100 mg by mouth daily. amLODIPine 10 mg tablet Commonly known as:  Annamarie Person Take 1 Tab by mouth daily. apixaban 5 mg tablet Commonly known as:  Lucy Jang Take 5 mg by mouth two (2) times a day. AURYXIA 210 mg iron tablet Generic drug:  ferric citrate TK 2 TS PO WITH MEALS  
  
 azaTHIOprine 50 mg tablet Commonly known as:  IMURAN  
  
 azithromycin 250 mg tablet Commonly known as:  Linda Blackbird Take 2 tablets today, then take 1 tablet daily COREG 6.25 mg tablet Generic drug:  carvedilol Take 6.25 mg by mouth two (2) times daily (with meals). cyanocobalamin 2,500 mcg sublingual tablet Commonly known as:  VITAMIN B-12 Take 1 Tab by mouth daily. fluticasone-vilanterol 200-25 mcg/dose inhaler Commonly known as:  BREO ELLIPTA Take 1 Puff by inhalation daily. Rinse mouth out after use  
  
 gemfibrozil 600 mg tablet Commonly known as:  LOPID  
  
 l.acidoph-B.lactis-B.longum 460 mg (7.5-6- 1.5 bill. cell) Cap cap Commonly known as:  PFEWRZUR8 Take 1 Cap by mouth Daily (before breakfast). levalbuterol 1.25 mg/3 mL Nebu Commonly known as:  XOPENEX  
3 mL by Nebulization route now for 1 dose. losartan 50 mg tablet Commonly known as:  COZAAR  
  
 metoprolol tartrate 50 mg tablet Commonly known as:  LOPRESSOR  
  
 ondansetron hcl 8 mg tablet Commonly known as:  Vesna Cook Take 1 Tab by mouth every eight (8) hours as needed. For Nausea  Indications: GASTROPARESIS  
  
 oxyCODONE IR 5 mg immediate release tablet Commonly known as:  Rosio Acuna Take 1 tab in AM and HALF to 1 tab in PM if needed on days where back pain is severe  
  
 pantoprazole 40 mg tablet Commonly known as:  PROTONIX Take 1 Tab by mouth Daily (before breakfast). PLAQUENIL 200 mg tablet Generic drug:  hydroxychloroquine Take 200 mg by mouth two (2) times a day. potassium chloride SR 20 mEq tablet Commonly known as:  K-TAB Take 2 Tabs by mouth daily for 15 days. predniSONE 10 mg tablet Commonly known as:  [employer] Take 9 mg by mouth daily (with breakfast). Senna 8.6 mg tablet Generic drug:  senna Take 1 Tab by mouth daily as needed. VITAMIN D2 50,000 unit capsule Generic drug:  ergocalciferol Take 50,000 Units by mouth every seven (7) days. Prescriptions Sent to Pharmacy Refills  
 fluticasone-vilanterol (BREO ELLIPTA) 200-25 mcg/dose inhaler 5 Sig: Take 1 Puff by inhalation daily. Rinse mouth out after use Class: Normal  
 Pharmacy: [address] [place] [address] #: [phone] Route: Inhalation We Performed the Following LEVALBUTEROL, INHAL. SOL., FDA-APPROVED FINAL, NON-COMPOUND UNIT DOSE, 0.5 MG [ [facility] [place]] [place] INHAL RX, AIRWAY OBST/DX SPUTUM INDUCT [id number] CPT(R)] To-Do List   
 12/12/2017  1:00 PM  
(Arrive by 12:30 PM) Appointment with VASCULAR ROOM 2 [facility]; [facility] VASCULAR LAB ([phone]) Patient Instructions Bronchitis: Care Instructions Your Care Instructions Bronchitis is inflammation of the bronchial tubes, which carry air to the lungs. The tubes swell and produce mucus, or phlegm. The mucus and inflamed bronchial tubes make you cough. You may have trouble breathing. Most cases of bronchitis are caused by viruses like those that cause colds. Antibiotics usually do not help and they may be harmful. Bronchitis usually develops rapidly and lasts about 2 to 3 weeks in otherwise healthy people. Follow-up care is a key part of your treatment and safety. Be sure to make and go to all appointments, and call your doctor if you are having problems. It's also a good idea to know your test results and keep a list of the medicines you take. How can you care for yourself at home? · Take all medicines exactly as prescribed. Call your doctor if you think you are having a problem with your medicine. · Get some extra rest. 
· Take an over-the-counter pain medicine, such as acetaminophen (Tylenol), ibuprofen (Advil, Motrin), or naproxen (Aleve) to reduce fever and relieve body aches. Read and follow all instructions on the label. · Do not take two or more pain medicines at the same time unless the doctor told you to. Many pain medicines have acetaminophen, which is Tylenol. Too much acetaminophen (Tylenol) can be harmful. · Take an over-the-counter cough medicine that contains dextromethorphan to help quiet a dry, hacking cough so that you can sleep. Avoid cough medicines that have more than one active ingredient. Read and follow all instructions on the label. · Breathe moist air from a humidifier, hot shower, or sink filled with hot water. The heat and moisture will thin mucus so you can cough it out. · Do not smoke. Smoking can make bronchitis worse. If you need help quitting, talk to your doctor about stop-smoking programs and medicines. These can increase your chances of quitting for good. When should you call for help? Call 911 anytime you think you may need emergency care. For example, call if: 
? · You have severe trouble breathing. ?Call your doctor now or seek immediate medical care if: 
? · You have new or worse trouble breathing. ? · You cough up dark brown or bloody mucus (sputum). ? · You have a new or higher fever. ? · You have a new rash. ? Watch closely for changes in your health, and be sure to contact your doctor if: 
? · You cough more deeply or more often, especially if you notice more mucus or a change in the color of your mucus. ? · You are not getting better as expected. Where can you learn more? Go to http://jorge-rochelle.info/. Enter H333 in the search box to learn more about \"Bronchitis: Care Instructions. \" Current as of: May 12, 2017 Content Version: 11.4 © 6446-3810 [employer]. Care instructions adapted under license by [employer] (which disclaims liability or warranty for this information). If you have questions about a medical condition or this instruction, always ask your healthcare professional. [place] [address] any warranty or liability for your use of this information. Introducing [facility]! [employer] introduces [employer]Next patient portal. Now you can access parts of your medical record, email your doctor's office, and request medication refills online. 1. In your internet browser, go to [email or web address] 2. Click on the First Time User? Click Here link in the Sign In box. You will see the New Member Sign Up page. 3. Enter your RetailNext Access Code exactly as it appears below. You will not need to use this code after youve completed the sign-up process. If you do not sign up before the expiration date, you must request a new code. · Retail[employer] Access Code: [email or web address] Expires: 2/26/2018 12:44 PM 
 
4. Enter the last four digits of your Social Security Number (xxxx) and Date of Birth (mm/dd/yyyy) as indicated and click Submit. You will be taken to the next sign-up page. 5. Create a Spacebikini ID. This will be your Space[employer]ik[employer] login ID and cannot be changed, so think of one that is secure and easy to remember. 6. Create a Spacebik[employer] password. You can change your password at any time. 7. Enter your Password Reset Question and Answer. This can be used at a later time if you forget your password. 8. Enter your e-mail address. You will receive e-mail notification when new information is available in [address] 9. Click Sign Up. You can now view and download portions of your medical record. 10. Click the Download Summary menu link to download a portable copy of your medical information. If you have questions, please visit the Frequently Asked Questions section of the Space[employer] website. Remember, Spacebik[employer] is NOT to be used for urgent needs. For medical emergencies, dial 911. Now available from your iPhone and Android! Please provide this summary of care documentation to your next provider. Your primary care clinician is listed as Tina Garcia. If you have any questions after today's visit, please call [phone].

## 2017-12-08 NOTE — TELEPHONE ENCOUNTER
Called this am and could not reach patient about today's apt. I left detailed message again re' need to come in today to see Colten Ojeda NP at 11:45 am . To please call and let us know that you can make apt. Today.

## 2017-12-12 ENCOUNTER — TELEPHONE (OUTPATIENT)
Dept: SURGERY | Age: 32
End: 2017-12-12

## 2017-12-12 ENCOUNTER — APPOINTMENT (OUTPATIENT)
Dept: CT IMAGING | Age: 32
DRG: 981 | End: 2017-12-12
Attending: EMERGENCY MEDICINE
Payer: MEDICARE

## 2017-12-12 ENCOUNTER — HOSPITAL ENCOUNTER (INPATIENT)
Age: 32
LOS: 10 days | Discharge: HOME | DRG: 981 | End: 2017-12-22
Attending: EMERGENCY MEDICINE | Admitting: INTERNAL MEDICINE
Payer: MEDICARE

## 2017-12-12 DIAGNOSIS — E87.6 HYPOKALEMIA: Primary | ICD-10-CM

## 2017-12-12 DIAGNOSIS — L93.2 OTHER LOCAL LUPUS ERYTHEMATOSUS: ICD-10-CM

## 2017-12-12 PROBLEM — A41.9 SEPSIS (HCC): Status: ACTIVE | Noted: 2017-12-12

## 2017-12-12 LAB
ALBUMIN SERPL-MCNC: 1 G/DL (ref 3.5–5)
ALBUMIN SERPL-MCNC: 1 G/DL (ref 3.5–5)
ALBUMIN/GLOB SERPL: 0.2 {RATIO} (ref 1.1–2.2)
ALBUMIN/GLOB SERPL: 0.2 {RATIO} (ref 1.1–2.2)
ALP SERPL-CCNC: 95 U/L (ref 45–117)
ALP SERPL-CCNC: 99 U/L (ref 45–117)
ALT SERPL-CCNC: 7 U/L (ref 12–78)
ALT SERPL-CCNC: 7 U/L (ref 12–78)
ANION GAP SERPL CALC-SCNC: 10 MMOL/L (ref 5–15)
ANION GAP SERPL CALC-SCNC: 10 MMOL/L (ref 5–15)
AST SERPL-CCNC: 10 U/L (ref 15–37)
AST SERPL-CCNC: 9 U/L (ref 15–37)
BASOPHILS # BLD: 0 K/UL (ref 0–0.1)
BASOPHILS NFR BLD: 0 % (ref 0–1)
BILIRUB SERPL-MCNC: 0.6 MG/DL (ref 0.2–1)
BILIRUB SERPL-MCNC: 0.6 MG/DL (ref 0.2–1)
BUN SERPL-MCNC: 32 MG/DL (ref 6–20)
BUN SERPL-MCNC: 33 MG/DL (ref 6–20)
BUN/CREAT SERPL: 4 (ref 12–20)
BUN/CREAT SERPL: 4 (ref 12–20)
CALCIUM SERPL-MCNC: 7.8 MG/DL (ref 8.5–10.1)
CALCIUM SERPL-MCNC: 7.8 MG/DL (ref 8.5–10.1)
CHLORIDE SERPL-SCNC: 92 MMOL/L (ref 97–108)
CHLORIDE SERPL-SCNC: 93 MMOL/L (ref 97–108)
CO2 SERPL-SCNC: 29 MMOL/L (ref 21–32)
CO2 SERPL-SCNC: 29 MMOL/L (ref 21–32)
CREAT SERPL-MCNC: 7.88 MG/DL (ref 0.55–1.02)
CREAT SERPL-MCNC: 7.9 MG/DL (ref 0.55–1.02)
DIFFERENTIAL METHOD BLD: ABNORMAL
EOSINOPHIL # BLD: 0 K/UL (ref 0–0.4)
EOSINOPHIL NFR BLD: 0 % (ref 0–7)
ERYTHROCYTE [DISTWIDTH] IN BLOOD BY AUTOMATED COUNT: 17.8 % (ref 11.5–14.5)
GLOBULIN SER CALC-MCNC: 5.6 G/DL (ref 2–4)
GLOBULIN SER CALC-MCNC: 5.6 G/DL (ref 2–4)
GLUCOSE SERPL-MCNC: 86 MG/DL (ref 65–100)
GLUCOSE SERPL-MCNC: 86 MG/DL (ref 65–100)
HCT VFR BLD AUTO: 32.4 % (ref 35–47)
HGB BLD-MCNC: 11.2 G/DL (ref 11.5–16)
LACTATE SERPL-SCNC: 1 MMOL/L (ref 0.4–2)
LIPASE SERPL-CCNC: 78 U/L (ref 73–393)
LYMPHOCYTES # BLD: 1.1 K/UL (ref 0.8–3.5)
LYMPHOCYTES NFR BLD: 6 % (ref 12–49)
MCH RBC QN AUTO: 28.4 PG (ref 26–34)
MCHC RBC AUTO-ENTMCNC: 34.6 G/DL (ref 30–36.5)
MCV RBC AUTO: 82 FL (ref 80–99)
MONOCYTES # BLD: 0.8 K/UL (ref 0–1)
MONOCYTES NFR BLD: 4 % (ref 5–13)
NEUTS SEG # BLD: 17.1 K/UL (ref 1.8–8)
NEUTS SEG NFR BLD: 90 % (ref 32–75)
PLATELET # BLD AUTO: 522 K/UL (ref 150–400)
POTASSIUM SERPL-SCNC: 2.8 MMOL/L (ref 3.5–5.1)
POTASSIUM SERPL-SCNC: 2.9 MMOL/L (ref 3.5–5.1)
PROT SERPL-MCNC: 6.6 G/DL (ref 6.4–8.2)
PROT SERPL-MCNC: 6.6 G/DL (ref 6.4–8.2)
RBC # BLD AUTO: 3.95 M/UL (ref 3.8–5.2)
RBC MORPH BLD: ABNORMAL
RBC MORPH BLD: ABNORMAL
SODIUM SERPL-SCNC: 131 MMOL/L (ref 136–145)
SODIUM SERPL-SCNC: 132 MMOL/L (ref 136–145)
T3FREE SERPL-MCNC: 1.2 PG/ML (ref 2.2–4)
T4 FREE SERPL-MCNC: 1 NG/DL (ref 0.8–1.5)
TSH SERPL DL<=0.05 MIU/L-ACNC: 2.89 UIU/ML (ref 0.36–3.74)
WBC # BLD AUTO: 19 K/UL (ref 3.6–11)

## 2017-12-12 PROCEDURE — 36415 COLL VENOUS BLD VENIPUNCTURE: CPT | Performed by: EMERGENCY MEDICINE

## 2017-12-12 PROCEDURE — 87040 BLOOD CULTURE FOR BACTERIA: CPT | Performed by: EMERGENCY MEDICINE

## 2017-12-12 PROCEDURE — 83605 ASSAY OF LACTIC ACID: CPT | Performed by: EMERGENCY MEDICINE

## 2017-12-12 PROCEDURE — 74176 CT ABD & PELVIS W/O CONTRAST: CPT

## 2017-12-12 PROCEDURE — 65660000000 HC RM CCU STEPDOWN

## 2017-12-12 PROCEDURE — 84443 ASSAY THYROID STIM HORMONE: CPT | Performed by: EMERGENCY MEDICINE

## 2017-12-12 PROCEDURE — 93005 ELECTROCARDIOGRAM TRACING: CPT

## 2017-12-12 PROCEDURE — 85025 COMPLETE CBC W/AUTO DIFF WBC: CPT | Performed by: EMERGENCY MEDICINE

## 2017-12-12 PROCEDURE — 84481 FREE ASSAY (FT-3): CPT | Performed by: EMERGENCY MEDICINE

## 2017-12-12 PROCEDURE — 96361 HYDRATE IV INFUSION ADD-ON: CPT

## 2017-12-12 PROCEDURE — 80053 COMPREHEN METABOLIC PANEL: CPT | Performed by: EMERGENCY MEDICINE

## 2017-12-12 PROCEDURE — 96374 THER/PROPH/DIAG INJ IV PUSH: CPT

## 2017-12-12 PROCEDURE — 74011250636 HC RX REV CODE- 250/636: Performed by: EMERGENCY MEDICINE

## 2017-12-12 PROCEDURE — 84439 ASSAY OF FREE THYROXINE: CPT | Performed by: EMERGENCY MEDICINE

## 2017-12-12 PROCEDURE — 74011250637 HC RX REV CODE- 250/637: Performed by: EMERGENCY MEDICINE

## 2017-12-12 PROCEDURE — 83690 ASSAY OF LIPASE: CPT | Performed by: EMERGENCY MEDICINE

## 2017-12-12 PROCEDURE — 99283 EMERGENCY DEPT VISIT LOW MDM: CPT

## 2017-12-12 RX ORDER — ACETAMINOPHEN 325 MG/1
650 TABLET ORAL
Status: DISCONTINUED | OUTPATIENT
Start: 2017-12-12 | End: 2017-12-22 | Stop reason: HOSPADM

## 2017-12-12 RX ORDER — POTASSIUM CHLORIDE 750 MG/1
40 TABLET, FILM COATED, EXTENDED RELEASE ORAL
Status: COMPLETED | OUTPATIENT
Start: 2017-12-12 | End: 2017-12-12

## 2017-12-12 RX ORDER — MORPHINE SULFATE 4 MG/ML
4 INJECTION, SOLUTION INTRAMUSCULAR; INTRAVENOUS ONCE
Status: COMPLETED | OUTPATIENT
Start: 2017-12-12 | End: 2017-12-12

## 2017-12-12 RX ADMIN — SODIUM CHLORIDE 500 ML: 900 INJECTION, SOLUTION INTRAVENOUS at 22:12

## 2017-12-12 RX ADMIN — MORPHINE SULFATE 4 MG: 4 INJECTION, SOLUTION INTRAMUSCULAR; INTRAVENOUS at 19:59

## 2017-12-12 RX ADMIN — POTASSIUM CHLORIDE 40 MEQ: 750 TABLET, FILM COATED, EXTENDED RELEASE ORAL at 21:43

## 2017-12-12 NOTE — IP AVS SNAPSHOT
[address] [place] [address] 
[phone] Patient: Pratibha Hollis MRN: [id number] :1985 My Medications STOP taking these medications   
 amLODIPine 10 mg tablet Commonly known as:  NORVASC  
   
  
 losartan 50 mg tablet Commonly known as:  COZAAR  
   
  
 oxyCODONE IR 5 mg immediate release tablet Commonly known as:  ROXICODONE  
   
  
  
TAKE these medications as instructed Instructions Each Dose to Equal  
 Morning Noon Evening Bedtime  
 albuterol 90 mcg/actuation inhaler Commonly known as:  PROVENTIL HFA, VENTOLIN HFA, PROAIR HFA Your last dose was: Your next dose is: Take 1-2 Puffs by inhalation every four (4) hours as needed for Wheezing or Shortness of Breath. 1-2 Puff  
    
   
   
   
  
 allopurinol 100 mg tablet Commonly known as:  Margy Wesley Your last dose was: Your next dose is: Take 100 mg by mouth daily. 100 mg  
    
   
   
   
  
 apixaban 5 mg tablet Commonly known as:  Payton Zarate Your last dose was: Your next dose is: Take 5 mg by mouth two (2) times a day. 5 mg  
    
   
   
   
  
 azaTHIOprine 50 mg tablet Commonly known as:  [employer] Your last dose was: Your next dose is: Take 50 mg by mouth daily. 50 mg COREG 6.25 mg tablet Generic drug:  carvedilol Your last dose was: Your next dose is: Take 12.5 mg by mouth two (2) times daily (with meals). 12.5 mg  
    
   
   
   
  
 cyanocobalamin 2,500 mcg sublingual tablet Commonly known as:  VITAMIN B-12 Your last dose was: Your next dose is: Take 1 Tab by mouth daily. 2500 mcg  
    
   
   
   
  
 fluconazole 200 mg tablet Commonly known as:  DIFLUCAN Your last dose was: Your next dose is: Take 1 Tab by mouth daily for 17 days. FDA advises cautious prescribing of oral fluconazole in pregnancy. 200 mg  
    
   
   
   
  
 fluticasone-vilanterol 200-25 mcg/dose inhaler Commonly known as:  BREO ELLIPTA Your last dose was: Your next dose is: Take 1 Puff by inhalation daily. Rinse mouth out after use 1 Puff  
    
   
   
   
  
 gemfibrozil 600 mg tablet Commonly known as:  LOPID Your last dose was: Your next dose is: Take 300 mg by mouth daily. 300 mg HYDROmorphone 2 mg tablet Commonly known as:  DILAUDID Your last dose was: Your next dose is: Take 1 Tab by mouth every six (6) hours as needed. Max Daily Amount: 8 mg.  
 2 mg  
    
   
   
   
  
 pantoprazole 40 mg tablet Commonly known as:  PROTONIX Your last dose was: Your next dose is: Take 1 Tab by mouth Daily (before breakfast). 40 mg  
    
   
   
   
  
 PLAQUENIL 200 mg tablet Generic drug:  hydroxychloroquine Your last dose was: Your next dose is: Take 200 mg by mouth two (2) times a day. 200 mg  
    
   
   
   
  
 predniSONE 5 mg tablet Commonly known as:  Susen Arbour Your last dose was: Your next dose is: Take 5 mg by mouth daily. 5 mg Senna 8.6 mg tablet Generic drug:  senna Your last dose was: Your next dose is: Take 1 Tab by mouth daily as needed. 1 Tab SENSIPAR 30 mg tablet Generic drug:  cinacalcet Your last dose was: Your next dose is: Take 30 mg by mouth daily. 30 mg Where to Get Your Medications Information on where to get these meds will be given to you by the nurse or doctor. ! Ask your nurse or doctor about these medications  
  fluconazole 200 mg tablet HYDROmorphone 2 mg tablet

## 2017-12-12 NOTE — ED TRIAGE NOTES
She arrives complaining of abdominal pain and inability to eat. This is an ongoing problem, which she was recently admitted for. She says she did well only briefly at home.  She has peritoneal dialysis, she is dwelling at present, due to drain tonight at 10pm.

## 2017-12-12 NOTE — IP AVS SNAPSHOT
[address] 
[phone] Patient: Derrell Craft MRN: [id number] :1985 About your hospitalization You were admitted on:  2017 You last received care in the:  [address] [place] [address] You were discharged on:  2017 Why you were hospitalized Your primary diagnosis was:  Sepsis (Hcc) Things You Need To Do (next 8 weeks) Go to [facility] today Friday at 10:30am, Saturday at 11:15am, and Tuesday at 11:00am  
  
Phone:  [phone] Where:  [address] [facility],[address], [address] [address]  Follow Up with Aman Gayle MD at  3:00 PM  
Where:  [facility]) Follow up with Jarod Chao NP Hospital follow up PCP appointment on Wednesday, 1/3/18 @ 3:30 p.m. Phone:  [phone] Where:  [address] [address] Discharge Orders None A check fahad indicates which time of day the medication should be taken. My Medications STOP taking these medications   
 amLODIPine 10 mg tablet Commonly known as:  NORVASC  
   
  
 losartan 50 mg tablet Commonly known as:  COZAAR  
   
  
 oxyCODONE IR 5 mg immediate release tablet Commonly known as:  ROXICODONE  
   
  
  
TAKE these medications as instructed Instructions Each Dose to Equal  
 Morning Noon Evening Bedtime  
 albuterol 90 mcg/actuation inhaler Commonly known as:  PROVENTIL HFA, VENTOLIN HFA, PROAIR HFA Your last dose was: Your next dose is: Take 1-2 Puffs by inhalation every four (4) hours as needed for Wheezing or Shortness of Breath. 1-2 Puff  
    
   
   
   
  
 allopurinol 100 mg tablet Commonly known as:  Ann Hernandez Your last dose was: Your next dose is: Take 100 mg by mouth daily.   
 100 mg  
    
   
 apixaban 5 mg tablet Commonly known as:  Nathan Hernandez Your last dose was: Your next dose is: Take 5 mg by mouth two (2) times a day. 5 mg  
    
   
   
   
  
 azaTHIOprine 50 mg tablet Commonly known as:  The Pepsi Your last dose was: Your next dose is: Take 50 mg by mouth daily. 50 mg COREG 6.25 mg tablet Generic drug:  carvedilol Your last dose was: Your next dose is: Take 12.5 mg by mouth two (2) times daily (with meals). 12.5 mg  
    
   
   
   
  
 cyanocobalamin 2,500 mcg sublingual tablet Commonly known as:  VITAMIN B-12 Your last dose was: Your next dose is: Take 1 Tab by mouth daily. 2500 mcg  
    
   
   
   
  
 fluconazole 200 mg tablet Commonly known as:  DIFLUCAN Your last dose was: Your next dose is: Take 1 Tab by mouth daily for 17 days. FDA advises cautious prescribing of oral fluconazole in pregnancy. 200 mg  
    
   
   
   
  
 fluticasone-vilanterol 200-25 mcg/dose inhaler Commonly known as:  BREO ELLIPTA Your last dose was: Your next dose is: Take 1 Puff by inhalation daily. Rinse mouth out after use 1 Puff  
    
   
   
   
  
 gemfibrozil 600 mg tablet Commonly known as:  LOPID Your last dose was: Your next dose is: Take 300 mg by mouth daily. 300 mg HYDROmorphone 2 mg tablet Commonly known as:  DILAUDID Your last dose was: Your next dose is: Take 1 Tab by mouth every six (6) hours as needed. Max Daily Amount: 8 mg.  
 2 mg  
    
   
   
   
  
 pantoprazole 40 mg tablet Commonly known as:  PROTONIX Your last dose was: Your next dose is: Take 1 Tab by mouth Daily (before breakfast). 40 mg  
    
   
   
   
  
 PLAQUENIL 200 mg tablet Generic drug:  hydroxychloroquine Your last dose was: Your next dose is: Take 200 mg by mouth two (2) times a day. 200 mg  
    
   
   
   
  
 predniSONE 5 mg tablet Commonly known as:  Maribel Beam Your last dose was: Your next dose is: Take 5 mg by mouth daily. 5 mg Senna 8.6 mg tablet Generic drug:  senna Your last dose was: Your next dose is: Take 1 Tab by mouth daily as needed. 1 Tab SENSIPAR 30 mg tablet Generic drug:  cinacalcet Your last dose was: Your next dose is: Take 30 mg by mouth daily. 30 mg Where to Get Your Medications Information on where to get these meds will be given to you by the nurse or doctor. ! Ask your nurse or doctor about these medications  
  fluconazole 200 mg tablet HYDROmorphone 2 mg tablet Discharge Instructions Discharge Instructions PATIENT ID: Shannon Sosa MRN: [id number] YOB: 1985 DATE OF ADMISSION: 12/12/2017  6:46 PM   
DATE OF DISCHARGE: 12/22/2017 PRIMARY CARE PROVIDER: Nader Sharma NP  
 
 
DISCHARGING PHYSICIAN: Shad Diaz MD   
To contact this individual call [phone] and ask the  to page. If unavailable ask to be transferred the Adult Hospitalist Department. DISCHARGE DIAGNOSES ESRD, fungal peritonitis. CONSULTATIONS: IP CONSULT TO HOSPITALIST 
IP CONSULT TO NEPHROLOGY 
IP CONSULT TO INFECTIOUS DISEASES PROCEDURES/SURGERIES: Procedure(s): REMOVAL PERITONEAL DIALYSIS CATHETER  
TUNNELED DIALYSIS CATHETER INSERTION PENDING TEST RESULTS:  
At the time of discharge the following test results are still pending: NA 
 
FOLLOW UP APPOINTMENTS:  
Follow-up Information Follow up With Details Comments Contact Info [place]  Friday at 10:30am, Saturday at 11:15am, and Tuesday at 11:00am [address] [address] 
[phone] Jovany Belle NP   [address] [place] [address] 
[phone] ADDITIONAL CARE RECOMMENDATIONS:  
Please follow up with your primary care provider in 1 to 2 weeks. Please follow up with hemodialysis as you have been scheduled. Follow up with your nephrologist as necessary. DIET: Renal Diet ACTIVITY: Activity as tolerated DISCHARGE MEDICATIONS: 
 See Medication Reconciliation Form · It is important that you take the medication exactly as they are prescribed. · Keep your medication in the bottles provided by the pharmacist and keep a list of the medication names, dosages, and times to be taken in your wallet. · Do not take other medications without consulting your doctor. NOTIFY YOUR PHYSICIAN FOR ANY OF THE FOLLOWING:  
Fever over 101 degrees for 24 hours. Chest pain, shortness of breath, fever, chills, nausea, vomiting, diarrhea, change in mentation, falling, weakness, bleeding. Severe pain or pain not relieved by medications. Or, any other signs or symptoms that you may have questions about. DISPOSITION: 
  Home With: 
 OT  PT  [facility]  RN  
  
 SNF/Inpatient Rehab/LTAC Independent/assisted living Hospice Other: CDMP Checked: Yes X Signed:  
Asmita Lopes MD 
12/22/2017 
9:12 AM 
 
ACO Transitions of Care Introducing [facility] [address] Ekaterina Krishnamurthy offers a voluntary care coordination program to provide high quality service and care to [facility] fee-for-service beneficiaries. Shakir Black was designed to help you enhance your health and well-being through the following services: ? Transitions of Care  support for individuals who are transitioning from one care setting to another (example: Hospital to home). ? Chronic and Complex Care Coordination  support for individuals and caregivers of those with serious or chronic illnesses or with more than one chronic (ongoing) condition and those who take a number of different medications. If you meet specific medical criteria, a Critical access hospital [address] may call you directly to coordinate your care with your primary care physician and your other care providers. For questions about the [facility] programs, please, contact your physicians office. For general questions or additional information about Accountable Care Organizations: 
Please visit www.medicare.gov/acos. html or call 1-800-MEDICARE (0-394.242.8550) TTY users should call 1-834.711.3566. Scopix Announcement We are excited to announce that we are making your provider's discharge notes available to you in Scopix. You will see these notes when they are completed and signed by the physician that discharged you from your recent hospital stay. If you have any questions or concerns about any information you see in Scopix, please call the Health Information Department where you were seen or reach out to your Primary Care Provider for more information about your plan of care. Introducing [facility]! Bigg Corley introduces Scopix patient portal. Now you can access parts of your medical record, email your doctor's office, and request medication refills online. 1. In your internet browser, go to [email or web address] 2. Click on the First Time User? Click Here link in the Sign In box. You will see the New Member Sign Up page. 3. Enter your Scopix Access Code exactly as it appears below. You will not need to use this code after youve completed the sign-up process. If you do not sign up before the expiration date, you must request a new code. · Scopix Access Code: [email or web address] Expires: 2/26/2018 12:44 PM 
 
 4. Enter the last four digits of your Social Security Number (xxxx) and Date of Birth (mm/dd/yyyy) as indicated and click Submit. You will be taken to the next sign-up page. 5. Create a CyberHeart ID. This will be your CyberHeart login ID and cannot be changed, so think of one that is secure and easy to remember. 6. Create a CyberHeart password. You can change your password at any time. 7. Enter your Password Reset Question and Answer. This can be used at a later time if you forget your password. 8. Enter your e-mail address. You will receive e-mail notification when new information is available in [address] 9. Click Sign Up. You can now view and download portions of your medical record. 10. Click the Download Summary menu link to download a portable copy of your medical information. If you have questions, please visit the Frequently Asked Questions section of the CyberHeart website. Remember, CyberHeart is NOT to be used for urgent needs. For medical emergencies, dial 911. Now available from your iPhone and Android! Unresulted Labs-Please follow up with your PCP about these lab tests Order Current Status CULTURE, FUNGUS In process FUNGITELL In process CULTURE, BLOOD FOR FUNGUS Preliminary result CULTURE, BLOOD, PAIRED Preliminary result Providers Seen During Your Hospitalization Provider Specialty Primary office phone Mica Cali, [address] [facility] Emergency Medicine [phone] Kimberley Cobian MD Internal Medicine [phone] Tim Morrison MD Hospitalist [phone] Raji Gamboa MD [facility] [phone] Your Primary Care Physician (PCP) Primary Care Physician Office Phone Office Fax Luis Naylor [phone] You are allergic to the following Allergen Reactions Compazine (Prochlorperazine Edisylate) Nausea and Vomiting Palpitations Fish Containing Products Rash An itchy rash appeared in about 1 hour Recent Documentation Height Weight BMI OB Status Smoking Status 1.651 m 59.2 kg 21.73 kg/m2 Medically Induced Never Smoker Emergency Contacts Name Discharge Info Relation Home Work Mobile Louis Sparks CAREGIVER [3] Mother [14] [phone] Tila Calderon  Father [15]   [phone] Patient Belongings The following personal items are in your possession at time of discharge: 
  Dental Appliances: None         Home Medications: None   Jewelry: Bracelet  Clothing: At bedside    Other Valuables: Cell Phone Please provide this summary of care documentation to your next provider. Signatures-by signing, you are acknowledging that this After Visit Summary has been reviewed with you and you have received a copy. Patient Signature:  ____________________________________________________________ Date:  ____________________________________________________________  
  
[facility] Provider Signature:  ____________________________________________________________ Date:  ____________________________________________________________

## 2017-12-12 NOTE — TELEPHONE ENCOUNTER
Spoke with Ms Kalen Membreno. Pt missed appt today for PVR upper extremity. Pt states she is not feeling well, having abdominal pain & is going to [facility] ED. Ms Kalen Membreno will call when she would like to reschedule vascular appt.

## 2017-12-13 ENCOUNTER — PATIENT OUTREACH (OUTPATIENT)
Dept: FAMILY MEDICINE CLINIC | Age: 32
End: 2017-12-13

## 2017-12-13 LAB
ALBUMIN SERPL-MCNC: 0.9 G/DL (ref 3.5–5)
ALBUMIN/GLOB SERPL: 0.2 {RATIO} (ref 1.1–2.2)
ALP SERPL-CCNC: 89 U/L (ref 45–117)
ALT SERPL-CCNC: <6 U/L (ref 12–78)
ANION GAP SERPL CALC-SCNC: 10 MMOL/L (ref 5–15)
APPEARANCE FLD: ABNORMAL
AST SERPL-CCNC: 7 U/L (ref 15–37)
ATRIAL RATE: 135 BPM
BASOPHILS # BLD: 0 K/UL (ref 0–0.1)
BASOPHILS NFR BLD: 0 % (ref 0–1)
BILIRUB SERPL-MCNC: 0.6 MG/DL (ref 0.2–1)
BUN SERPL-MCNC: 36 MG/DL (ref 6–20)
BUN/CREAT SERPL: 5 (ref 12–20)
CALCIUM SERPL-MCNC: 7.9 MG/DL (ref 8.5–10.1)
CALCULATED P AXIS, ECG09: 69 DEGREES
CALCULATED R AXIS, ECG10: 65 DEGREES
CALCULATED T AXIS, ECG11: -94 DEGREES
CHLORIDE SERPL-SCNC: 96 MMOL/L (ref 97–108)
CO2 SERPL-SCNC: 26 MMOL/L (ref 21–32)
COLOR FLD: YELLOW
CREAT SERPL-MCNC: 7.96 MG/DL (ref 0.55–1.02)
DIAGNOSIS, 93000: NORMAL
EOSINOPHIL # BLD: 0.1 K/UL (ref 0–0.4)
EOSINOPHIL NFR BLD: 0 % (ref 0–7)
EOSINOPHIL NFR FLD MANUAL: 2 %
ERYTHROCYTE [DISTWIDTH] IN BLOOD BY AUTOMATED COUNT: 17.7 % (ref 11.5–14.5)
GLOBULIN SER CALC-MCNC: 5.2 G/DL (ref 2–4)
GLUCOSE SERPL-MCNC: 83 MG/DL (ref 65–100)
GRAM STN SPEC: NORMAL
HCT VFR BLD AUTO: 27.5 % (ref 35–47)
HGB BLD-MCNC: 9.5 G/DL (ref 11.5–16)
LDH SERPL L TO P-CCNC: 532 U/L (ref 81–246)
LYMPHOCYTES # BLD: 1.4 K/UL (ref 0.8–3.5)
LYMPHOCYTES NFR BLD: 9 % (ref 12–49)
LYMPHOCYTES NFR FLD: 2 %
MAGNESIUM SERPL-MCNC: 1.4 MG/DL (ref 1.6–2.4)
MCH RBC QN AUTO: 27.8 PG (ref 26–34)
MCHC RBC AUTO-ENTMCNC: 34.5 G/DL (ref 30–36.5)
MCV RBC AUTO: 80.4 FL (ref 80–99)
MESOTHL CELL NFR FLD: 2 %
MONOCYTES # BLD: 0.6 K/UL (ref 0–1)
MONOCYTES NFR BLD: 4 % (ref 5–13)
MONOS+MACROS NFR FLD: 4 %
NEUTROPHILS NFR FLD: 90 %
NEUTS SEG # BLD: 13.9 K/UL (ref 1.8–8)
NEUTS SEG NFR BLD: 87 % (ref 32–75)
NUC CELL # FLD: 3769 /CU MM (ref 0–5)
P-R INTERVAL, ECG05: 122 MS
PATH REV BLD -IMP: ABNORMAL
PHOSPHATE SERPL-MCNC: 3.7 MG/DL (ref 2.6–4.7)
PLATELET # BLD AUTO: 468 K/UL (ref 150–400)
POTASSIUM SERPL-SCNC: 3.5 MMOL/L (ref 3.5–5.1)
PROT FLD-MCNC: >24 G/DL
PROT SERPL-MCNC: 6.1 G/DL (ref 6.4–8.2)
Q-T INTERVAL, ECG07: 292 MS
QRS DURATION, ECG06: 86 MS
QTC CALCULATION (BEZET), ECG08: 438 MS
RBC # BLD AUTO: 3.42 M/UL (ref 3.8–5.2)
RBC # FLD: >100 /CU MM
SERVICE CMNT-IMP: NORMAL
SODIUM SERPL-SCNC: 132 MMOL/L (ref 136–145)
SPECIMEN SOURCE FLD: ABNORMAL
SPECIMEN SOURCE FLD: NORMAL
TSH SERPL DL<=0.05 MIU/L-ACNC: 3.06 UIU/ML (ref 0.36–3.74)
VENTRICULAR RATE, ECG03: 135 BPM
WBC # BLD AUTO: 15.9 K/UL (ref 3.6–11)

## 2017-12-13 PROCEDURE — 84100 ASSAY OF PHOSPHORUS: CPT | Performed by: INTERNAL MEDICINE

## 2017-12-13 PROCEDURE — 65660000000 HC RM CCU STEPDOWN

## 2017-12-13 PROCEDURE — 85025 COMPLETE CBC W/AUTO DIFF WBC: CPT | Performed by: INTERNAL MEDICINE

## 2017-12-13 PROCEDURE — 74011250636 HC RX REV CODE- 250/636: Performed by: INTERNAL MEDICINE

## 2017-12-13 PROCEDURE — 77030029684 HC NEB SM VOL KT MONA -A

## 2017-12-13 PROCEDURE — 83735 ASSAY OF MAGNESIUM: CPT | Performed by: INTERNAL MEDICINE

## 2017-12-13 PROCEDURE — 94640 AIRWAY INHALATION TREATMENT: CPT

## 2017-12-13 PROCEDURE — 87106 FUNGI IDENTIFICATION YEAST: CPT | Performed by: EMERGENCY MEDICINE

## 2017-12-13 PROCEDURE — 84157 ASSAY OF PROTEIN OTHER: CPT | Performed by: EMERGENCY MEDICINE

## 2017-12-13 PROCEDURE — 74011000250 HC RX REV CODE- 250: Performed by: INTERNAL MEDICINE

## 2017-12-13 PROCEDURE — 36415 COLL VENOUS BLD VENIPUNCTURE: CPT | Performed by: INTERNAL MEDICINE

## 2017-12-13 PROCEDURE — 89050 BODY FLUID CELL COUNT: CPT | Performed by: EMERGENCY MEDICINE

## 2017-12-13 PROCEDURE — 80053 COMPREHEN METABOLIC PANEL: CPT | Performed by: INTERNAL MEDICINE

## 2017-12-13 PROCEDURE — 74011250637 HC RX REV CODE- 250/637: Performed by: HOSPITALIST

## 2017-12-13 PROCEDURE — 74011250637 HC RX REV CODE- 250/637: Performed by: INTERNAL MEDICINE

## 2017-12-13 PROCEDURE — 74011636637 HC RX REV CODE- 636/637: Performed by: INTERNAL MEDICINE

## 2017-12-13 PROCEDURE — 87205 SMEAR GRAM STAIN: CPT | Performed by: EMERGENCY MEDICINE

## 2017-12-13 PROCEDURE — 84443 ASSAY THYROID STIM HORMONE: CPT | Performed by: INTERNAL MEDICINE

## 2017-12-13 PROCEDURE — 83615 LACTATE (LD) (LDH) ENZYME: CPT | Performed by: INTERNAL MEDICINE

## 2017-12-13 PROCEDURE — 74011250636 HC RX REV CODE- 250/636: Performed by: HOSPITALIST

## 2017-12-13 RX ORDER — CINACALCET 30 MG/1
30 TABLET, FILM COATED ORAL
Status: DISCONTINUED | OUTPATIENT
Start: 2017-12-13 | End: 2017-12-19

## 2017-12-13 RX ORDER — ALBUTEROL SULFATE 90 UG/1
1-2 AEROSOL, METERED RESPIRATORY (INHALATION)
Status: DISCONTINUED | OUTPATIENT
Start: 2017-12-13 | End: 2017-12-13 | Stop reason: ALTCHOICE

## 2017-12-13 RX ORDER — VANCOMYCIN HYDROCHLORIDE
1250 ONCE
Status: COMPLETED | OUTPATIENT
Start: 2017-12-13 | End: 2017-12-15

## 2017-12-13 RX ORDER — GEMFIBROZIL 600 MG/1
300 TABLET, FILM COATED ORAL DAILY
Status: DISCONTINUED | OUTPATIENT
Start: 2017-12-14 | End: 2017-12-22 | Stop reason: HOSPADM

## 2017-12-13 RX ORDER — FLUCONAZOLE 2 MG/ML
200 INJECTION, SOLUTION INTRAVENOUS ONCE
Status: COMPLETED | OUTPATIENT
Start: 2017-12-13 | End: 2017-12-15

## 2017-12-13 RX ORDER — ONDANSETRON 2 MG/ML
4 INJECTION INTRAMUSCULAR; INTRAVENOUS
Status: DISCONTINUED | OUTPATIENT
Start: 2017-12-13 | End: 2017-12-22 | Stop reason: HOSPADM

## 2017-12-13 RX ORDER — CARVEDILOL 6.25 MG/1
6.25 TABLET ORAL 2 TIMES DAILY WITH MEALS
Status: DISCONTINUED | OUTPATIENT
Start: 2017-12-13 | End: 2017-12-13

## 2017-12-13 RX ORDER — AMLODIPINE BESYLATE 5 MG/1
2.5 TABLET ORAL DAILY
Status: DISCONTINUED | OUTPATIENT
Start: 2017-12-14 | End: 2017-12-13

## 2017-12-13 RX ORDER — SODIUM CHLORIDE 9 MG/ML
75 INJECTION, SOLUTION INTRAVENOUS CONTINUOUS
Status: DISPENSED | OUTPATIENT
Start: 2017-12-13 | End: 2017-12-14

## 2017-12-13 RX ORDER — PREDNISONE 5 MG/1
5 TABLET ORAL DAILY
Status: ON HOLD | COMMUNITY
End: 2018-06-28

## 2017-12-13 RX ORDER — CEFEPIME HYDROCHLORIDE 2 G/1
2 INJECTION, POWDER, FOR SOLUTION INTRAVENOUS ONCE
Status: DISCONTINUED | OUTPATIENT
Start: 2017-12-13 | End: 2017-12-13 | Stop reason: SDUPTHER

## 2017-12-13 RX ORDER — PREDNISONE 5 MG/1
5 TABLET ORAL
Status: DISCONTINUED | OUTPATIENT
Start: 2017-12-14 | End: 2017-12-22 | Stop reason: HOSPADM

## 2017-12-13 RX ORDER — POTASSIUM CHLORIDE 750 MG/1
40 TABLET, FILM COATED, EXTENDED RELEASE ORAL DAILY
Status: DISCONTINUED | OUTPATIENT
Start: 2017-12-13 | End: 2017-12-13

## 2017-12-13 RX ORDER — CARVEDILOL 12.5 MG/1
12.5 TABLET ORAL 2 TIMES DAILY WITH MEALS
Status: DISCONTINUED | OUTPATIENT
Start: 2017-12-13 | End: 2017-12-14

## 2017-12-13 RX ORDER — GEMFIBROZIL 600 MG/1
600 TABLET, FILM COATED ORAL
Status: DISCONTINUED | OUTPATIENT
Start: 2017-12-13 | End: 2017-12-13

## 2017-12-13 RX ORDER — METOPROLOL TARTRATE 50 MG/1
50 TABLET ORAL DAILY
Status: DISCONTINUED | OUTPATIENT
Start: 2017-12-13 | End: 2017-12-13

## 2017-12-13 RX ORDER — HYDROMORPHONE HYDROCHLORIDE 2 MG/ML
1 INJECTION, SOLUTION INTRAMUSCULAR; INTRAVENOUS; SUBCUTANEOUS
Status: DISCONTINUED | OUTPATIENT
Start: 2017-12-13 | End: 2017-12-20

## 2017-12-13 RX ORDER — ACETAMINOPHEN 325 MG/1
650 TABLET ORAL
Status: DISCONTINUED | OUTPATIENT
Start: 2017-12-13 | End: 2017-12-13 | Stop reason: SDUPTHER

## 2017-12-13 RX ORDER — BUDESONIDE 0.5 MG/2ML
500 INHALANT ORAL
Status: DISCONTINUED | OUTPATIENT
Start: 2017-12-13 | End: 2017-12-22 | Stop reason: HOSPADM

## 2017-12-13 RX ORDER — ALBUTEROL SULFATE 0.83 MG/ML
2.5 SOLUTION RESPIRATORY (INHALATION)
Status: DISCONTINUED | OUTPATIENT
Start: 2017-12-13 | End: 2017-12-22 | Stop reason: HOSPADM

## 2017-12-13 RX ORDER — SENNOSIDES 8.6 MG/1
1 TABLET ORAL
Status: DISCONTINUED | OUTPATIENT
Start: 2017-12-13 | End: 2017-12-22 | Stop reason: HOSPADM

## 2017-12-13 RX ORDER — CINACALCET 30 MG/1
30 TABLET, FILM COATED ORAL DAILY
COMMUNITY
End: 2018-01-17

## 2017-12-13 RX ORDER — VANCOMYCIN/0.9 % SOD CHLORIDE 1 G/100 ML
1 PLASTIC BAG, INJECTION (ML) INTRAVENOUS EVERY 12 HOURS
Status: DISCONTINUED | OUTPATIENT
Start: 2017-12-13 | End: 2017-12-13 | Stop reason: DRUGHIGH

## 2017-12-13 RX ORDER — FLUCONAZOLE 2 MG/ML
200 INJECTION, SOLUTION INTRAVENOUS EVERY 24 HOURS
Status: DISCONTINUED | OUTPATIENT
Start: 2017-12-13 | End: 2017-12-22 | Stop reason: HOSPADM

## 2017-12-13 RX ORDER — LOSARTAN POTASSIUM 50 MG/1
50 TABLET ORAL DAILY
Status: DISCONTINUED | OUTPATIENT
Start: 2017-12-13 | End: 2017-12-14

## 2017-12-13 RX ORDER — HYDROXYCHLOROQUINE SULFATE 200 MG/1
200 TABLET, FILM COATED ORAL 2 TIMES DAILY
Status: DISCONTINUED | OUTPATIENT
Start: 2017-12-13 | End: 2017-12-22 | Stop reason: HOSPADM

## 2017-12-13 RX ORDER — PANTOPRAZOLE SODIUM 40 MG/1
40 TABLET, DELAYED RELEASE ORAL
Status: DISCONTINUED | OUTPATIENT
Start: 2017-12-13 | End: 2017-12-22 | Stop reason: HOSPADM

## 2017-12-13 RX ORDER — ALLOPURINOL 100 MG/1
100 TABLET ORAL DAILY
Status: DISCONTINUED | OUTPATIENT
Start: 2017-12-13 | End: 2017-12-22 | Stop reason: HOSPADM

## 2017-12-13 RX ORDER — OXYCODONE HYDROCHLORIDE 5 MG/1
5 TABLET ORAL
Status: DISCONTINUED | OUTPATIENT
Start: 2017-12-13 | End: 2017-12-20

## 2017-12-13 RX ORDER — ARFORMOTEROL TARTRATE 15 UG/2ML
15 SOLUTION RESPIRATORY (INHALATION)
Status: DISCONTINUED | OUTPATIENT
Start: 2017-12-13 | End: 2017-12-22 | Stop reason: HOSPADM

## 2017-12-13 RX ORDER — AZATHIOPRINE 50 MG/1
50 TABLET ORAL
Status: DISCONTINUED | OUTPATIENT
Start: 2017-12-13 | End: 2017-12-22 | Stop reason: HOSPADM

## 2017-12-13 RX ORDER — AMLODIPINE BESYLATE 5 MG/1
10 TABLET ORAL DAILY
Status: DISCONTINUED | OUTPATIENT
Start: 2017-12-13 | End: 2017-12-13

## 2017-12-13 RX ADMIN — HYDROMORPHONE HYDROCHLORIDE 1 MG: 2 INJECTION INTRAMUSCULAR; INTRAVENOUS; SUBCUTANEOUS at 21:38

## 2017-12-13 RX ADMIN — SODIUM CHLORIDE 75 ML/HR: 900 INJECTION, SOLUTION INTRAVENOUS at 14:00

## 2017-12-13 RX ADMIN — FLUCONAZOLE 200 MG: 2 INJECTION INTRAVENOUS at 15:24

## 2017-12-13 RX ADMIN — BUDESONIDE 500 MCG: 0.5 INHALANT RESPIRATORY (INHALATION) at 08:15

## 2017-12-13 RX ADMIN — PIPERACILLIN SODIUM AND TAZOBACTAM SODIUM 3.38 G: .375; 3 INJECTION, POWDER, LYOPHILIZED, FOR SOLUTION INTRAVENOUS at 04:56

## 2017-12-13 RX ADMIN — HYDROXYCHLOROQUINE SULFATE 200 MG: 200 TABLET, FILM COATED ORAL at 09:01

## 2017-12-13 RX ADMIN — PREDNISONE 9 MG: 5 TABLET ORAL at 09:01

## 2017-12-13 RX ADMIN — CARVEDILOL 6.25 MG: 3.12 TABLET, FILM COATED ORAL at 09:01

## 2017-12-13 RX ADMIN — AMLODIPINE BESYLATE 10 MG: 5 TABLET ORAL at 09:00

## 2017-12-13 RX ADMIN — ONDANSETRON 4 MG: 2 INJECTION INTRAMUSCULAR; INTRAVENOUS at 08:58

## 2017-12-13 RX ADMIN — CINACALCET HYDROCHLORIDE 30 MG: 30 TABLET, COATED ORAL at 18:11

## 2017-12-13 RX ADMIN — BUDESONIDE 500 MCG: 0.5 INHALANT RESPIRATORY (INHALATION) at 22:12

## 2017-12-13 RX ADMIN — VANCOMYCIN HYDROCHLORIDE 1250 MG: 10 INJECTION, POWDER, LYOPHILIZED, FOR SOLUTION INTRAVENOUS at 06:39

## 2017-12-13 RX ADMIN — FLUCONAZOLE 200 MG: 2 INJECTION INTRAVENOUS at 16:00

## 2017-12-13 RX ADMIN — LOSARTAN POTASSIUM 50 MG: 50 TABLET ORAL at 09:01

## 2017-12-13 RX ADMIN — HYDROXYCHLOROQUINE SULFATE 200 MG: 200 TABLET, FILM COATED ORAL at 18:10

## 2017-12-13 RX ADMIN — METOPROLOL TARTRATE 50 MG: 50 TABLET ORAL at 09:00

## 2017-12-13 RX ADMIN — PANTOPRAZOLE SODIUM 40 MG: 40 TABLET, DELAYED RELEASE ORAL at 07:49

## 2017-12-13 RX ADMIN — APIXABAN 5 MG: 5 TABLET, FILM COATED ORAL at 18:10

## 2017-12-13 RX ADMIN — POTASSIUM CHLORIDE 40 MEQ: 750 TABLET, FILM COATED, EXTENDED RELEASE ORAL at 09:01

## 2017-12-13 RX ADMIN — APIXABAN 5 MG: 5 TABLET, FILM COATED ORAL at 09:00

## 2017-12-13 RX ADMIN — SODIUM CHLORIDE 75 ML/HR: 900 INJECTION, SOLUTION INTRAVENOUS at 22:53

## 2017-12-13 RX ADMIN — ARFORMOTEROL TARTRATE 15 MCG: 15 SOLUTION RESPIRATORY (INHALATION) at 08:15

## 2017-12-13 RX ADMIN — AZATHIOPRINE 50 MG: 50 TABLET ORAL at 09:01

## 2017-12-13 RX ADMIN — OXYCODONE HYDROCHLORIDE 5 MG: 5 TABLET ORAL at 15:24

## 2017-12-13 RX ADMIN — ALLOPURINOL 100 MG: 100 TABLET ORAL at 09:02

## 2017-12-13 RX ADMIN — GEMFIBROZIL 600 MG: 600 TABLET ORAL at 09:02

## 2017-12-13 RX ADMIN — ARFORMOTEROL TARTRATE 15 MCG: 15 SOLUTION RESPIRATORY (INHALATION) at 22:12

## 2017-12-13 NOTE — PROGRESS NOTES
NUTRITION COMPLETE ASSESSMENT    RECOMMENDATIONS:   1. Continue current diet, Supplements (specify)  2. Encourage increased po intake; focus on protein/supplements; document po intake in flow sheets  3. Daily weights  4. Replete magnesium     Interventions/Plan:   Food/Nutrient Delivery:            RD to add supplements, snacks    Assessment:   Reason for Assessment:   [x] Provider Consult    Diet: Cardiac, Regular (2gm Na)  Supplements: None  Nutritionally Significant Medications: [x] Reviewed & Includes: FLoraQ, Protonix, PD dextrose, Sensipar  Meal Intake: No data found. Pre-Hospitalization:  Usual Appetite: Poor    Current Hospitalization:   Fluid Restriction:  none  Appetite: Poor  PO Ability: Independent Average po intake:Less than 25%  Average supplements intake:  n/a      Subjective:  \"I haven't had much of an appetite. \"    Objective:  Pt seen for MD consult. Pt admitted with sepsis. PMHx: anemia, heart failure, carditis, hypercholesterolemia, chronic pain, lupus, thromboembolus, asthma, peritoneal dialysis, ESRD, DDD, and malignant HTN with chronic kidney disease. Reviewed chart, spoke with pt. Per nephrology, possible switch to HD. Pt states decreased appetite for past couple months with wt loss. Noted 56 lb (?) wt loss in past 5 months (16 lbs in past 4 days?) which is severe in nature (question accuracy of current wt, although significant wt loss has occurred over past several months). Offered pt supplements (Nepro), pt accepted; would also like yogurt for snack. Discussed focusing on protein at meals. Nepro BID will provide 850 kcal, ~20g protein meeting 55% caloric, 29% protein needs if 100% consumed. Temporal wasting noted.     Meets Criteria for Acute Malnutrition   [x] Severe Malnutrition, as evidenced by:   [x] Moderate muscle wasting, loss of subcutaneous fat   [x] Nutritional intake of <50% of recommended intake for >5 days   [x] Weight loss of >1-2% in 1 week, >5% in 1 month, >7.5% in 3 months, or >10% in 6 months   [] Moderate-severe edema     Noted low magnesium; needs repletion. RD to follow po intake meals, supplements, wt status, renal labs. Estimated Nutrition Needs:   Kcals/day: 1550 Kcals/day (2623-8058 kcal/day (MSJ x 1.2-1.3))  Protein: 69 g (69-75g/day (1.2-1.3g/kg))  Fluid: 1600 ml (1mL/kcal)  Based On: [place] Jeor  Weight Used: Actual wt    Pt expected to meet estimated nutrient needs:  []   Yes     []  No [x] Unable to predict at this time    Nutrition Diagnosis:   1. Unintended weight loss related to decreased appetite as evidenced by pt with 43 lb (25%) wt loss over past 2 months    2. Increased nutrient needs (protein) related to ESRD on PD, poor appetite as evidenced by pt on PD with wt loss, decreased appetite    Goals:       Pt will consume at least 50% meals, supplement over next 5-7 days     Monitoring & Evaluation:    - Total energy intake, Liquid meal replacement   - Weight/weight change, Electrolyte and renal profile   -      Previous Nutrition Goals Met:   N/A  Previous Recommendations:    N/A    Education & Discharge Needs:   [x] None Identified   [] Identified and addressed    [] Participated in care plan, discharge planning, and/or interdisciplinary rounds        Cultural, Scientologist and ethnic food preferences identified: None    Skin Integrity: []Intact  [x]Other- PD cath  Edema: []None [x]Other- LLE, RLE 1+  Last BM: Unknown  Food Allergies: []None [x]Other - fish  Diet Restrictions: Cultural/Worship Preference(s): None     Anthropometrics:    Weight Loss Metrics 12/12/2017 12/8/2017 12/8/2017 12/7/2017 12/4/2017 11/28/2017 11/10/2017   Today's Wt 127 lb 143 lb 9.6 oz 143 lb 146 lb 161 lb 9.6 oz 177 lb 173 lb   BMI 21.13 kg/m2 23.9 kg/m2 23.8 kg/m2 24.3 kg/m2 26.89 kg/m2 29.45 kg/m2 28.79 kg/m2         Height: 5' 5\" (165.1 cm),    Body mass index is 21.13 kg/(m^2).    ,     ,      Labs:    Lab Results   Component Value Date/Time    Sodium 132 12/13/2017 04:38 AM    Potassium 3.5 12/13/2017 04:38 AM    Chloride 96 12/13/2017 04:38 AM    CO2 26 12/13/2017 04:38 AM    Glucose 83 12/13/2017 04:38 AM    BUN 36 12/13/2017 04:38 AM    Creatinine 7.96 12/13/2017 04:38 AM    Calcium 7.9 12/13/2017 04:38 AM    Magnesium 1.4 12/13/2017 04:38 AM    Phosphorus 3.7 12/13/2017 04:38 AM    Albumin 0.9 12/13/2017 04:38 AM     Lab Results   Component Value Date/Time    Hemoglobin A1c 5.4 09/25/2015 02:02 PM         Fabrizio hSeriff RD

## 2017-12-13 NOTE — PROGRESS NOTES
Zosyn dose adjustment  Indication:  sepsis of unknown etiology  Current regimen:  2.25gm q6h  Abx regimen: vancomycin - pharmacy dosing  Recent Labs      17   1943   WBC   --    --   19.0*   CREA  7.88*  7.90*   --    BUN  32*  33*   --      Est CrCl: 9 ml/min  Temp (24hrs), Av.9 °F (37.2 °C), Min:97.9 °F (36.6 °C), Max:99.9 °F (37.7 °C)    Cultures: pending    Plan: Change to 3.375gm q 6 then 6.75gm q24h     **close i-vent after initial review. Remove this text prior to posting to note.

## 2017-12-13 NOTE — ED PROVIDER NOTES
HPI Comments: 33 y.o. female with past medical history significant for anemia, heart failure, carditis, hypercholesterolemia, chronic pain, lupus, thromboembolus, asthma, peritoneal dialysis, ESRD, DDD, and malignant HTN with chronic kidney disease who presents from home with chief complaint of flank pain. Patient states that she has been having increased right flank pain and lower back pain for the past 1-2 weeks. She states that she has also had a decreased appetite \"since October\" and has lost a significant amount of weight. Patient denies having any fever, chills, numbness, leg swelling, rash, nausea, vomiting, or bowel abnormalities. There are no other acute medical concerns at this time. Social hx: nonsmoker, no EtOH use, no drug use  PCP: Rea Beltre NP  Review of Records: Patient recently admitted on 2017 for anasarca and hypokalemia    Note written by Boy Olguin, as dictated by Dariela Estrella. Kalina Tovar MD 7:04 PM      The history is provided by the patient. No  was used.         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 ([address]     Peritoneal dialysis status ([address] 10/2015    Thromboembolus ([address] 2013    lungs       Past Surgical History:   Procedure Laterality Date    HX  SECTION  2006    HX OTHER SURGICAL  9/16/15    INSERTION PD CATH         Family History:   Problem Relation Age of Onset    Diabetes Father     Hypertension Father     Cancer Other      aunt with breast cancer    Diabetes Mother        Social History     Social History    Marital status: SINGLE     Spouse name: N/A    Number of children: N/A    Years of education: N/A     Occupational History    Not on file. Social History Main Topics    Smoking status: Never Smoker    Smokeless tobacco: Never Used    Alcohol use No    Drug use: Yes     Special: Prescription, OTC    Sexual activity: Not Currently     Other Topics Concern     Service No    Blood Transfusions No    Caffeine Concern No    Occupational Exposure No    Hobby Hazards No    Sleep Concern No    Stress Concern No    Weight Concern No    Special Diet No    Back Care Yes     low back pain    Exercise No    Bike Helmet No    Seat Belt Yes    Self-Exams No     Social History Narrative    Lives with parents and daughter. ALLERGIES: Compazine [prochlorperazine edisylate] and Fish containing products    Review of Systems   Constitutional: Positive for appetite change and unexpected weight change. Negative for chills and fever. HENT: Negative for ear pain and sore throat. Eyes: Negative for pain. Respiratory: Negative for chest tightness and shortness of breath. Cardiovascular: Negative for chest pain and leg swelling. Gastrointestinal: Negative for abdominal pain, blood in stool, nausea and vomiting. Genitourinary: Positive for flank pain. Negative for dysuria. Musculoskeletal: Positive for back pain. Skin: Negative for rash. Neurological: Negative for headaches. All other systems reviewed and are negative. Vitals:    12/12/17 1702 12/12/17 1725   BP: 102/71    Pulse: 66    Resp: 20    Temp: 97.9 °F (36.6 °C)    SpO2: 97%    Weight:  57.6 kg (127 lb)   Height: 5' 5\" (1.651 m)             Physical Exam   Constitutional: She appears ill (chronic). Emaciated   HENT:   Head: Normocephalic and atraumatic.    Mouth/Throat: Oropharynx is clear and moist.   Eyes: Conjunctivae and EOM are normal. Pupils are equal, round, and reactive to light. No scleral icterus. Neck: Neck supple. No tracheal deviation present. Cardiovascular: Regular rhythm, normal heart sounds and intact distal pulses. Tachycardia present. Pulmonary/Chest: Effort normal and breath sounds normal. No respiratory distress. Abdominal: Soft. She exhibits no distension. There is no tenderness. There is no rebound and no guarding. R flank tenderness   Genitourinary:   Genitourinary Comments: No peritoneal numbness   Musculoskeletal: She exhibits no edema. Lumbar back: She exhibits tenderness. Neurological: She is alert. She has normal strength. No cranial nerve deficit or sensory deficit. Skin: Skin is warm and dry. Psychiatric: She has a normal mood and affect. Nursing note and vitals reviewed. Note written by Boy Figueroa, as dictated by Flo Schulz. Ara Gifford MD 7:04 PM    MDM  Number of Diagnoses or Management Options  Hypokalemia:   Diagnosis management comments: 70-year-old female with history of peritoneal dialysis presents with right flank and abdominal pain, vomiting. Labs remarkable for potassium of 2.9, white blood cell count of 19. Plan: IV fluids, potassium repletion, admit for rule out peritoneal infection. ED Course       Procedures  ED EKG interpretation:  Rhythm: sinus tachycardia; and regular . Rate (approx.): 135; Axis: normal; ST/T wave: T wave inverted in lateral leads. LVH. Note written by Boy Figueroa, as dictated by Flo Schulz.  Ara Gifford MD 10:48 PM

## 2017-12-13 NOTE — ED NOTES
Bedside report received from \[facility]\"", patient resting comfortably, V/S stable, NADN

## 2017-12-13 NOTE — ED NOTES
EKG completed, patient resting quietly, V/S, HR is tachycardic, patient states usually has a fast HR, provider aware, NADN

## 2017-12-13 NOTE — CONSULTS
ID consult  NAME:  Barbara Alston                      :   1985                       MRN:   [id number]   Date/Time:  2017 3:56 PM  Subjective:   REASON FOR CONSULT:   Candida peritonitis      Barbara Alston  is a 33 y. o.female  with a history of SLE, lupus nephritis leading to ESRD /peritoneal dialysis/HTN  Admitted with abdominal pain and inabiltiy to eat  Multiple hospitalizations in the past few months  OCt 14- for left lower lobe pneumonia/effusion- was found to have PE on the rt side  10/27-10/30 possible pneumonia- same infiltrate left lower lobe- sent on augmentin    - for b/l leg swelling and abdominal swelling  She had ascitic fluid sent for culture during that admission  But she was discharged before the result came back- and it has candida albicans  SHe is now admitted with abdominal pain, poor appetite  Pt says the pain is more on the rt side, sharp can come with movement- no radiation- lasts a few minutes  She has no appetite- can become nauseous if she eats. She has lost 60 pounds she says since the past few months  She rarely makes urine  No diarrhea  Has a baseline cough of few months- does not bring up any sputum.   She has been on peritoneal dialysis since - had cath placed in   LMP 2 yrs ago  Was sexually active in 2017  HIV [address] negative in   Last travel was to [place] in 2017  Medical history   Pericardial effusion  PE  Anemia  HTN  Hypoalbuminemia  Hypercholesterolemia      Past Surgical History:   Procedure Laterality Date    HX  SECTION  2006    HX OTHER SURGICAL  9/16/15    INSERTION PD CATH         Family History   Problem Relation Age of Onset    Diabetes Father     Hypertension Father     Cancer Other      aunt with breast cancer    Diabetes Mother      Allergies   Allergen Reactions    Compazine [Prochlorperazine Edisylate] Nausea and Vomiting and Palpitations    Fish Containing Products Rash     An itchy rash appeared in about 1 hour     SH  Lives with her parents and 6year old daughter  Non smoker  No alcohol  No illicit drug use      Current Facility-Administered Medications   Medication Dose Route Frequency Provider Last Rate Last Dose    allopurinol (ZYLOPRIM) tablet 100 mg  100 mg Oral DAILY Cristobal Velazquez MD   100 mg at 12/13/17 0902    apixaban (ELIQUIS) tablet 5 mg  5 mg Oral BID Cristobal Velazquez MD   5 mg at 12/13/17 0900    azaTHIOprine (IMURAN) tablet 50 mg  50 mg Oral DAILY AFTER BREAKFAST Cristobal Velazquez MD   50 mg at 12/13/17 0901    hydroxychloroquine (PLAQUENIL) tablet 200 mg  200 mg Oral BID Cristobal Velazquez MD   200 mg at 12/13/17 0901    losartan (COZAAR) tablet 50 mg  50 mg Oral DAILY Jes Burrell MD   50 mg at 12/13/17 0901    oxyCODONE IR (ROXICODONE) tablet 5 mg  5 mg Oral Q6H PRN Cristobal Velazquez MD   5 mg at 12/13/17 1524    pantoprazole (PROTONIX) tablet 40 mg  40 mg Oral ACB Jorge L Martinez MD   40 mg at 12/13/17 0749    senna (SENOKOT) tablet 8.6 mg  1 Tab Oral QHS PRN Cristobal Velazquez MD        HYDROmorphone (DILAUDID) injection 1 mg  1 mg IntraVENous Q4H PRN Cristobal Velazquez MD        ondansetron (ZOFRAN) injection 4 mg  4 mg IntraVENous Q4H PRN Cristobal Velazquez MD   4 mg at 12/13/17 0858    lactobac ac& pc-s.therm-b.anim (AJIT Q/RISAQUAD)  1 Cap Oral DAILY Cristobal Velazquez MD        albuterol (PROVENTIL VENTOLIN) nebulizer solution 2.5 mg  2.5 mg Nebulization Q4H PRN Cristobal Velazquez MD        arformoterol (BROVANA) neb solution 15 mcg  15 mcg Nebulization BID RT Cristobal Velazquez MD   15 mcg at 12/13/17 0815    And    budesonide (PULMICORT) 500 mcg/2 ml nebulizer suspension  500 mcg Nebulization BID RT Cristobal Velazquez MD   500 mcg at 12/13/17 0815    carvedilol (COREG) tablet 12.5 mg  12.5 mg Oral BID WITH MEALS Marilyn Christie MD        [START ON 12/14/2017] gemfibrozil (LOPID) tablet 300 mg  300 mg Oral DAILY MD Kaleb Cannon ON 12/14/2017] predniSONE (DELTASONE) tablet 5 mg  5 mg Oral DAILY WITH BREAKFAST Neptali Begum MD        cinacalcet BON [facility]) tablet 30 mg  30 mg Oral DAILY WITH DINNER Neptali Begum MD        fluconazole (DIFLUCAN) 200mg/100 mL IVPB (premix)  200 mg IntraVENous Q24H Neptali Begum  mL/hr at 12/13/17 1524 200 mg at 12/13/17 1524    0.9% sodium chloride infusion  75 mL/hr IntraVENous CONTINUOUS Neptali Begum MD 75 mL/hr at 12/13/17 1400 75 mL/hr at 12/13/17 1400    peritoneal dialysis DEXTROSE 1.5% (2.5 mEq/L low calcium) solution 2,500 mL  2,500 mL IntraPERitoneal 5XD MD Kaleb Cabreratino RamirezKath ON 12/14/2017] peritoneal dialysis with additives   IntraPERitoneal ONCE Fede Olivia MD        cefepime (MAXIPIME) injection 2 g  2 g Peritoneal Catheter ONCE Fede Olivia MD        fluconazole (DIFLUCAN) 200mg/100 mL IVPB (premix)  200 mg IntraVENous ONCE Alma Chahal MD        acetaminophen (TYLENOL) tablet 650 mg  650 mg Oral Q4H PRN Jonathon Jj MD            REVIEW OF SYSTEMS:     Const: negative fever, negative chills, weight loss  Eyes:  negative diplopia or visual changes, negative eye pain  ENT:  negative coryza, negative sore throat  Resp:  cough, no hemoptysis, dyspnea  Cards: retrosternal chest pain on coughing  Skin:  negative for rash and pruritus  Heme: negative for easy bruising and gum/nose bleeding  MS: Back pain  Neurolo:negative for headaches, dizziness, vertigo, memory problems       Objective:   VITALS:    Visit Vitals    BP 92/59    Pulse 86    Temp 98.5 °F (36.9 °C)    Resp 18    Ht 5' 5\" (1.651 m)    Wt 127 lb (57.6 kg)    SpO2 95%    BMI 21.13 kg/m2       PHYSICAL EXAM:   General:    Alert, cooperative, no distress, appears stated age. Chronically ill   Head:   Normocephalic, without obvious abnormality, atraumatic. Eyes:   Conjunctivae clear, anicteric sclerae.   Pupils are equal  Nose:  Nares normal. No drainage or sinus tenderness. Throat:    Lips, mucosa, and tongue normal.  No Thrush  Neck:  Supple,   Back:    No CVA tenderness. Lungs:   B/l air entry  crepts bases    Heart:   s1s2  Abdomen:   Soft  Extremities: Extremities normal, atraumatic, no cyanosis. No edema. No clubbing  Skin:     Dry skin- striae over arms/abdomen  Lymph: Cervical, supraclavicular normal.  Neurologic: Grossly non-focal    Pertinent Labs  Wbc 19>15.9  Hb 9.5    Lactate 1  Albumin 0.9  Peritoneal fluid- 3769 WBC ( 90% N)  12/13  Peritoneal fluid-yeast   11/27- candida parapsilosis  IMAGING RESULTS:      Impression/Recommendation  33 yr female with SLE/ lupus nephritis/HTN on peritoneal dialysis  Admitted with abdominal pain    Fungal peritonitis due to candida in a patient with catheter for peritoneal dialysis- It is candida parapsilosis- fluconazole loading dose following by dose adjusted for crcl  The catheter will have to be removed  Blood culture pending  Also on vanco and cefepime as per nephrology- will continue till cultures finalilze    Weight loss- ? Due to ESRD and need for hemodialysis  ? Due to lupus  She is at risk for infections like TB secondary to her status and immunosuppressed state.  No contact with TB  Will check HIV and hepatitis panel    SLE-on imuran/plaquenil and prednisone    HTN-on losartan    PE- on elaquis    Hypoalbuminemia ?due to malnutrition    Chronic cough- no clinical evidence of PCP  Discussed with patient, requesting provider and the pharmacist

## 2017-12-13 NOTE — CONSULTS
Patient name: Jesica Wagoner MRN: [id number]      [facility]  Initial Consult Note  DOA:12/12/2017  DOS: 12/13/2017  Requested by: Dr. Marshal Wilson  Reason: Evaluation and management of ESRD  Source: pt/chart review    Assessment:  ESRD - on CCPD as outpt  HTN>>low nl BP/tachycardia  Peritonitis- based on Sx of abd pain, elevated cell count. Previous admit G/S, cell count, Bcx were all negative. 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 Pt has been declining on PD. Her Albumin has been very low. Recurrent hospital admit. I had been talking to her to transition to HD, but goal was to get AV access in place and move to HD, if she has persistent failure to thrive. She had significant edema during her last outpt monthly visit and we made adjustments to her PD regimen and suspect she got more UF compounded by peritonitis and now she is clinically dry. Has low nl BP with tachycardia. We have weaned her off of clonidine during previous admit. Have had difficulty in having her see her Rheumatologist for f/u (Dr. Clementina Skiff)    Plan/Recommendations:   Will do CAPD in house; use 1.5% dianneal every 6 hrs for now; let her run positive or even on fluid balance  IP ABx- IP Vanco and Cefipime or Ceftazidime with one of the exchange  Further ABx based on culture results  PD Fluid cell count- repeat in AM  D/C Norvasc, while hypotensive  Hold parameters for losartan; ct Coreg for now since tachy  IVF has been ordered- taper quickly in 24 hrs once BP improves  ID eval  May need removal of PD catheter and transition to HD, if she has fungal infection  Check Complements in AM    HPI: Barbara Alston is a 33 y.o. female with PMH significant for ESRD on PD, HTN, SLE, SHPT, on chronic immunosupression>>presents with Abd pain. Some nausea, no vomiting or diarrhea. No fever/chills. Pain started a week ago. Location -[facility]. Comes and goes. Able to take PO. Denies having cloudy fluid, blood in stool, cough, recent travel or sick contacts. CT A/P was negative for acute pathology. PD fluid cell count is high, elevated WBC suggestive of peritonitis. Started on IV ABx and IVF. Nephrology consulted for mx of PD. Pt has a dog at home. She reports good hygiene measures when she does PD at home. She did have resolution of her edema with modification of her PD exchanges. Denies cp, sob, leg edema  +weakness/tiredness.    Appetite Ok    PMH:    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 ([address]     Peritoneal dialysis status ([address] 10/2015    Thromboembolus ([address] 2013    lungs       PSH:  Past Surgical History:   Procedure Laterality Date    HX  SECTION  2006    HX OTHER SURGICAL  9/16/15    INSERTION PD CATH       Allergies   Allergen Reactions    Compazine [Prochlorperazine Edisylate] Nausea and Vomiting and Palpitations    Fish Containing Products Rash     An itchy rash appeared in about 1 hour       Prescriptions Prior to Admission   Medication Sig    predniSONE (DELTASONE) 5 mg tablet Take 5 mg by mouth daily.  cinacalcet (SENSIPAR) 30 mg tablet Take 30 mg by mouth daily.  oxyCODONE IR (ROXICODONE) 5 mg immediate release tablet Take 1 tab in AM and HALF to 1 tab in PM if needed on days where back pain is severe       fluticasone-vilanterol (BREO ELLIPTA) 200-25 mcg/dose inhaler Take 1 Puff by inhalation daily. Rinse mouth out after use    gemfibrozil (LOPID) 600 mg tablet Take 300 mg by mouth daily.  losartan (COZAAR) 50 mg tablet Take 50 mg by mouth daily.  carvedilol (COREG) 6.25 mg tablet Take 12.5 mg by mouth two (2) times daily (with meals).  apixaban (ELIQUIS) 5 mg tablet Take 5 mg by mouth two (2) times a day.  azaTHIOprine (IMURAN) 50 mg tablet Take 50 mg by mouth daily.  albuterol (PROVENTIL HFA, VENTOLIN HFA, PROAIR HFA) 90 mcg/actuation inhaler Take 1-2 Puffs by inhalation every four (4) hours as needed for Wheezing or Shortness of Breath.  amLODIPine (NORVASC) 10 mg tablet Take 1 Tab by mouth daily.  hydroxychloroquine (PLAQUENIL) 200 mg tablet Take 200 mg by mouth two (2) times a day.  allopurinol (ZYLOPRIM) 100 mg tablet Take 100 mg by mouth daily.  cyanocobalamin (VITAMIN B-12) 2,500 mcg sublingual tablet Take 1 Tab by mouth daily.  pantoprazole (PROTONIX) 40 mg tablet Take 1 Tab by mouth Daily (before breakfast).  senna (SENNA) 8.6 mg tablet Take 1 Tab by mouth daily as needed. Social history:  Social History     Social History    Marital status: SINGLE     Spouse name: N/A    Number of children: N/A    Years of education: N/A     Occupational History    Not on file.      Social History Main Topics    Smoking status: Never Smoker    Smokeless tobacco: Never Used    Alcohol use No    Drug use: Yes     Special: Prescription, OTC    Sexual activity: Not Currently     Other Topics Concern     Service No    Blood Transfusions No    Caffeine Concern No    Occupational Exposure No    Hobby Hazards No    Sleep Concern No    Stress Concern No    Weight Concern No    Special Diet No    Back Care Yes     low back pain    Exercise No    Bike Helmet No    Seat Belt Yes    Self-Exams No     Social History Narrative    Lives with parents and daughter. Family history:  No family history of CKD or ESRD    Review of Systems: as noted in HPI; rest is negative    Physical Exam:  Visit Vitals    /82 (BP Patient Position: At rest)    Pulse (!) 123    Temp 98.9 °F (37.2 °C)    Resp 16    Ht 5' 5\" (1.651 m)    Wt 57.6 kg (127 lb)    SpO2 94%    BMI 21.13 kg/m2       Young female, ill appearing in NAD  AT/NC, no icterus  No JVD  Clear ant/lat  RRR, tachy, no murmur, no rub  Soft, mild tenderness- more on R side; no guarding or rigidity  PD cath in place. Exit site clean. No discharge or anu tenderness  No c/c no edema  AOx3. No myoclonus    Labs/Data:   Lab Results   Component Value Date/Time    WBC 15.9 12/13/2017 04:38 AM    Hemoglobin (POC) 11.2 09/16/2015 12:08 PM    HGB 9.5 12/13/2017 04:38 AM    Hematocrit (POC) 33 09/16/2015 12:08 PM    HCT 27.5 12/13/2017 04:38 AM    PLATELET 809 13/60/4803 04:38 AM    MCV 80.4 12/13/2017 04:38 AM       Lab Results   Component Value Date/Time    Sodium 132 12/13/2017 04:38 AM    Potassium 3.5 12/13/2017 04:38 AM    Chloride 96 12/13/2017 04:38 AM    CO2 26 12/13/2017 04:38 AM    Anion gap 10 12/13/2017 04:38 AM    Glucose 83 12/13/2017 04:38 AM    BUN 36 12/13/2017 04:38 AM    Creatinine 7.96 12/13/2017 04:38 AM    BUN/Creatinine ratio 5 12/13/2017 04:38 AM    GFR est AA 7 12/13/2017 04:38 AM    GFR est non-AA 6 12/13/2017 04:38 AM    Calcium 7.9 12/13/2017 04:38 AM       No intake or output data in the 24 hours ending 12/13/17 1347    Wt Readings from Last 3 Encounters:   12/12/17 57.6 kg (127 lb)   12/08/17 65.1 kg (143 lb 9.6 oz)   12/08/17 64.9 kg (143 lb)       Patient seen and examined. Chart reviewed.  Labs, data and other pertinent notes reviewed from admit    Discussed with pt/Dr. Bijan Melchor    Signed by:  Harika Holley MD  Nephrology and HTN

## 2017-12-13 NOTE — PROGRESS NOTES
TRANSFER - OUT REPORT:    Verbal report given to Deshaun Stearns RN (name) on Angella Jimenez  being transferred to [facility] (unit) for routine progression of care       Report consisted of patients Situation, Background, Assessment and   Recommendations(SBAR). Information from the following report(s) SBAR, Kardex, Intake/Output, Recent Results and Cardiac Rhythm Sinsus Tach was reviewed with the receiving nurse. Lines:   Peripheral IV 12/12/17 Left Wrist (Active)   Site Assessment Clean, dry, & intact 12/13/2017  8:21 AM   Phlebitis Assessment 0 12/13/2017  8:21 AM   Infiltration Assessment 0 12/13/2017  8:21 AM   Dressing Status Clean, dry, & intact 12/13/2017  8:21 AM   Dressing Type Transparent;Tape 12/13/2017  8:21 AM   Hub Color/Line Status Blue 12/13/2017  8:21 AM       Peripheral IV 12/12/17 Left Antecubital (Active)   Site Assessment Clean, dry, & intact 12/13/2017  8:21 AM   Phlebitis Assessment 0 12/13/2017  8:21 AM   Infiltration Assessment 0 12/13/2017  8:21 AM   Dressing Status Clean, dry, & intact 12/13/2017  8:21 AM   Dressing Type Transparent;Tape 12/13/2017  8:21 AM   Hub Color/Line Status Pink 12/13/2017  8:21 AM        Opportunity for questions and clarification was provided.       Patient transported with:   Monitor  Registered Nurse  Tech

## 2017-12-13 NOTE — PROGRESS NOTES
Bedside shift change report given to [address] (oncoming nurse) by Sonam Osorio RN (offgoing nurse). Report included the following information SBAR, Kardex, ED Summary, Procedure Summary, Intake/Output, MAR, Recent Results and Cardiac Rhythm Sinus Tach.

## 2017-12-13 NOTE — ED NOTES
Bedside shift change report given to Sita Can (oncoming nurse) by Oscar (offgoing nurse). Report included the following information SBAR.

## 2017-12-13 NOTE — H&P
History & Physical    Primary Care Provider: Jarod Chao NP  Source of Information: Patient     History of Presenting Illness:   Derrell Craft is a 33year old woman with past medical hx noted for HTN, SLE with lupus nephritis, ESRD on PD, dyslipidemia, thromboembolism on Eliquis came to the ED because of abdominal pain. This started about two weeks ago, located at the center of abdomen with radiation to right flank and back, constant, 8/10 in severity, associated with nausea but no vomiting, no known aggravating or relieving factors. She was admitted here from 17 to 17 for evaluation and treatment of similar symptoms. CT of A/P done on arrival at the ED did not show acute pathology, patient was referred to hospitalist service for admission. Patient reported fever at home but no fever on arrival at the ED           Review of Systems:  HEENT: No headache, no dizziness, no photophobia     Respiratory: No shortness of breath, no cough, no hemoptysis   Cardiovascular: No chest pain, no palpation, no orthopnea    Gastrointestinal: Positive for  abdominal pain,  Nausea, no  vomiting.    Genitourinary: no  Dysuria, no urgency and no frequency    All other systems reviewed and are negative    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 ([address]     Peritoneal dialysis status ([address] 10/2015    Thromboembolus ([address] 2013    lungs      Past Surgical History:   Procedure Laterality Date    HX  SECTION  11/2006    HX OTHER SURGICAL  9/16/15    INSERTION PD CATH     Prior to Admission medications    Medication Sig Start Date End Date Taking? Authorizing Provider   oxyCODONE IR (ROXICODONE) 5 mg immediate release tablet Take 1 tab in AM and HALF to 1 tab in PM if needed on days where back pain is severe    12/8/17   Timur Flores MD   fluticasone-vilanterol (BREO ELLIPTA) 200-25 mcg/dose inhaler Take 1 Puff by inhalation daily. Rinse mouth out after use 12/8/17   Dirk Contreras NP   gemfibrozil (LOPID) 600 mg tablet  11/3/17   Historical Provider   losartan (COZAAR) 50 mg tablet  11/3/17   Historical Provider   metoprolol tartrate (LOPRESSOR) 50 mg tablet  10/10/17   Historical Provider   potassium chloride SR (K-TAB) 20 mEq tablet Take 2 Tabs by mouth daily for 15 days. 11/28/17 12/13/17  Katja Lopez MD   carvedilol (COREG) 6.25 mg tablet Take 6.25 mg by mouth two (2) times daily (with meals). Historical Provider   apixaban (ELIQUIS) 5 mg tablet Take 5 mg by mouth two (2) times a day. Historical Provider   azaTHIOprine (IMURAN) 50 mg tablet  11/3/17   Historical Provider   albuterol (PROVENTIL HFA, VENTOLIN HFA, PROAIR HFA) 90 mcg/actuation inhaler Take 1-2 Puffs by inhalation every four (4) hours as needed for Wheezing or Shortness of Breath. 10/30/17   Brock Mcrae NP   lLorenzoacidoph-B.lactis-B.longum [facility]) 460 mg (7.5-6- 1.5 bill. cell) cap cap Take 1 Cap by mouth Daily (before breakfast). 10/17/17   Jadon Ervin MD   amLODIPine (NORVASC) 10 mg tablet Take 1 Tab by mouth daily. 10/7/16   Dirk Contreras NP   AURYXIA 210 mg iron tablet TK 2 TS PO WITH MEALS 7/20/16   Historical Provider   predniSONE (DELTASONE) 10 mg tablet Take 9 mg by mouth daily (with breakfast). Historical Provider   ergocalciferol (VITAMIN D2) 50,000 unit capsule Take 50,000 Units by mouth every seven (7) days.     Phys Other, MD   hydroxychloroquine (PLAQUENIL) 200 mg tablet Take 200 mg by mouth two (2) times a day. Darnell Franks MD   ondansetron hcl (ZOFRAN) 8 mg tablet Take 1 Tab by mouth every eight (8) hours as needed. For Nausea  Indications: GASTROPARESIS 7/14/16   David Arreguin MD   allopurinol (ZYLOPRIM) 100 mg tablet Take 100 mg by mouth daily. 10/15/15   Historical Provider   cyanocobalamin (VITAMIN B-12) 2,500 mcg sublingual tablet Take 1 Tab by mouth daily. 10/27/15   Evonne Keenan MD   pantoprazole (PROTONIX) 40 mg tablet Take 1 Tab by mouth Daily (before breakfast). 10/23/15   Darren Flood MD   senna (SENNA) 8.6 mg tablet Take 1 Tab by mouth daily as needed. Historical Provider     Allergies   Allergen Reactions    Compazine [Prochlorperazine Edisylate] Nausea and Vomiting and Palpitations    Fish Containing Products Rash     An itchy rash appeared in about 1 hour      Family History   Problem Relation Age of Onset    Diabetes Father     Hypertension Father     Cancer Other      aunt with breast cancer    Diabetes Mother         SOCIAL HISTORY:  Patient resides:  Independently x   Assisted Living    SNF    With family care       Smoking history:   None x   Former    Chronic      Alcohol history:   None    Social    Chronic      Ambulates:   Independently x   w/cane    w/walker    w/wc    CODE STATUS:  DNR    Full x   Other      Objective:     Physical Exam:     Visit Vitals    /70    Pulse (!) 131    Temp 97.9 °F (36.6 °C)    Resp 25    Ht 5' 5\" (1.651 m)    Wt 57.6 kg (127 lb)    SpO2 94%    BMI 21.13 kg/m2      O2 Device: Room air    General:   Not in  Distress. Head:  Normocephalic, atraumatic. Eyes:  Normal eye movement, no redness, no drainage . Nose: No deformity, No drainage . Throat: No visible oral lesions   Neck: Supple,no thyroid enlargement, no JVD. Back:   No tenderness   Lungs:   Clear to auscultation bilaterally. Chest wall:  No tenderness or deformity.    Heart:  Regular rate and rhythm, S1, S2 normal, no murmur   Abdomen:   Soft, mild diffuse tenderness. Bowel sounds normal. No masses,  No organomegaly. Intact PD catheter    Extremities: Extremities normal, atraumatic, no  edema. Pulses: 2+ and symmetric all extremities. Skin: No active skin  lesions   Neurologic: Alert, oriented X3. No gross focal deficit          EKG:     Data Review: CT of A/P did not show acute pathology . Recent Days:  Recent Labs      12/12/17   1943   WBC  19.0*   HGB  11.2*   HCT  32.4*   PLT  522*     Recent Labs      12/12/17 2048 12/12/17 2045   NA  131*  132*   K  2.8*  2.9*   CL  92*  93*   CO2  29  29   GLU  86  86   BUN  32*  33*   CREA  7.88*  7.90*   CA  7.8*  7.8*   ALB  1.0*  1.0*   TBILI  0.6  0.6   SGOT  9*  10*   ALT  7*  7*     No results for input(s): PH, PCO2, PO2, HCO3, FIO2 in the last 72 hours. 24 Hour Results:  Recent Results (from the past 24 hour(s))   CBC WITH AUTOMATED DIFF    Collection Time: 12/12/17  7:43 PM   Result Value Ref Range    WBC 19.0 (H) 3.6 - 11.0 K/uL    RBC 3.95 3.80 - 5.20 M/uL    HGB 11.2 (L) 11.5 - 16.0 g/dL    HCT 32.4 (L) 35.0 - 47.0 %    MCV 82.0 80.0 - 99.0 FL    MCH 28.4 26.0 - 34.0 PG    MCHC 34.6 30.0 - 36.5 g/dL    RDW 17.8 (H) 11.5 - 14.5 %    PLATELET 510 (H) 375 - 400 K/uL    NEUTROPHILS 90 (H) 32 - 75 %    LYMPHOCYTES 6 (L) 12 - 49 %    MONOCYTES 4 (L) 5 - 13 %    EOSINOPHILS 0 0 - 7 %    BASOPHILS 0 0 - 1 %    ABS. NEUTROPHILS 17.1 (H) 1.8 - 8.0 K/UL    ABS. LYMPHOCYTES 1.1 0.8 - 3.5 K/UL    ABS. MONOCYTES 0.8 0.0 - 1.0 K/UL    ABS. EOSINOPHILS 0.0 0.0 - 0.4 K/UL    ABS.  BASOPHILS 0.0 0.0 - 0.1 K/UL    DF SMEAR SCANNED      RBC COMMENTS ANISOCYTOSIS  1+        RBC COMMENTS TARGET CELLS  PRESENT       T3, FREE    Collection Time: 12/12/17  8:45 PM   Result Value Ref Range    Free Triiodothyronine (T3) 1.2 (L) 2.2 - 4.0 pg/mL   T4, FREE    Collection Time: 12/12/17  8:45 PM   Result Value Ref Range    T4, Free 1.0 0.8 - 1.5 NG/DL   TSH 3RD GENERATION    Collection Time: 12/12/17  8:45 PM   Result Value Ref Range    TSH 2.89 0.36 - 5.67 uIU/mL   METABOLIC PANEL, COMPREHENSIVE    Collection Time: 12/12/17  8:45 PM   Result Value Ref Range    Sodium 132 (L) 136 - 145 mmol/L    Potassium 2.9 (L) 3.5 - 5.1 mmol/L    Chloride 93 (L) 97 - 108 mmol/L    CO2 29 21 - 32 mmol/L    Anion gap 10 5 - 15 mmol/L    Glucose 86 65 - 100 mg/dL    BUN 33 (H) 6 - 20 MG/DL    Creatinine 7.90 (H) 0.55 - 1.02 MG/DL    BUN/Creatinine ratio 4 (L) 12 - 20      GFR est AA 7 (L) >60 ml/min/1.73m2    GFR est non-AA 6 (L) >60 ml/min/1.73m2    Calcium 7.8 (L) 8.5 - 10.1 MG/DL    Bilirubin, total 0.6 0.2 - 1.0 MG/DL    ALT (SGPT) 7 (L) 12 - 78 U/L    AST (SGOT) 10 (L) 15 - 37 U/L    Alk. phosphatase 99 45 - 117 U/L    Protein, total 6.6 6.4 - 8.2 g/dL    Albumin 1.0 (L) 3.5 - 5.0 g/dL    Globulin 5.6 (H) 2.0 - 4.0 g/dL    A-G Ratio 0.2 (L) 1.1 - 2.2     METABOLIC PANEL, COMPREHENSIVE    Collection Time: 12/12/17  8:48 PM   Result Value Ref Range    Sodium 131 (L) 136 - 145 mmol/L    Potassium 2.8 (L) 3.5 - 5.1 mmol/L    Chloride 92 (L) 97 - 108 mmol/L    CO2 29 21 - 32 mmol/L    Anion gap 10 5 - 15 mmol/L    Glucose 86 65 - 100 mg/dL    BUN 32 (H) 6 - 20 MG/DL    Creatinine 7.88 (H) 0.55 - 1.02 MG/DL    BUN/Creatinine ratio 4 (L) 12 - 20      GFR est AA 7 (L) >60 ml/min/1.73m2    GFR est non-AA 6 (L) >60 ml/min/1.73m2    Calcium 7.8 (L) 8.5 - 10.1 MG/DL    Bilirubin, total 0.6 0.2 - 1.0 MG/DL    ALT (SGPT) 7 (L) 12 - 78 U/L    AST (SGOT) 9 (L) 15 - 37 U/L    Alk.  phosphatase 95 45 - 117 U/L    Protein, total 6.6 6.4 - 8.2 g/dL    Albumin 1.0 (L) 3.5 - 5.0 g/dL    Globulin 5.6 (H) 2.0 - 4.0 g/dL    A-G Ratio 0.2 (L) 1.1 - 2.2     LIPASE    Collection Time: 12/12/17  8:48 PM   Result Value Ref Range    Lipase 78 73 - 393 U/L   EKG, 12 LEAD, INITIAL    Collection Time: 12/12/17  9:04 PM   Result Value Ref Range    Ventricular Rate 135 BPM    Atrial Rate 135 BPM    P-R Interval 122 ms    QRS Duration 86 ms    Q-T Interval 292 ms    QTC Calculation (Bezet) 438 ms    Calculated P Axis 69 degrees    Calculated R Axis 65 degrees    Calculated T Axis -94 degrees    Diagnosis       Sinus tachycardia  Left ventricular hypertrophy with repolarization abnormality  When compared with ECG of 26-NOV-2017 21:06,  Inverted T waves have replaced nonspecific T wave abnormality in Inferior   leads  T wave inversion now evident in Lateral leads     LACTIC ACID    Collection Time: 12/12/17 10:36 PM   Result Value Ref Range    Lactic acid 1.0 0.4 - 2.0 MMOL/L         Imaging:     Assessment      1: Sepsis  2: Suspected peritonitis   3: ESRD on PD  4: HTN  5: Dyslipidemia  6: Lupus  7: Thromboembolism  8: Hypokalemia        Plan      1: Sepsis. Will place patient on vanco and Zosyn for suspected sepsis, this is supported by tachycardia, leukocytosis and is coming from suspected peritonitis    2: Suspected peritonitis. Will place patient on antibiotics, await peritoneal fluid analysis    3: ESRD on PD. Will require nephrology consult for continuation of PD  4: HTN. Will resume home medications  5: Dyslipidemia. Will continue home medication  6: Lupus. Will resume home medications   7: Thromboembolism. Continue Eliquis   8: Hypokalemia.  Will replace and repeat level, check mag level      DVT Prophylaxis, patient is already on eliquis   Signed By: Aly Smith MD     December 13, 2017

## 2017-12-13 NOTE — PROGRESS NOTES
Pharmacist Note - Vancomycin Dosing    Consult provided for this 33 y.o. female for indication of sepsis unknown etiology. Antibiotic regimen(s): vancomycin, zosyn    Recent Labs      17   WBC   --    --   19.0*   CREA  7.88*  7.90*   --    BUN  32*  33*   --      Frequency of BMP: one time   Height: 165 cm  Weight: 57.6 kg  Est CrCl: 9 ml/min  Temp (24hrs), Av.9 °F (37.2 °C), Min:97.9 °F (36.6 °C), Max:99.9 °F (37.7 °C)    Cultures:pending    Goal trough = 15 - 20 mcg/mL    Therapy will be initiated with a loading dose of 1250 mg IV x 1. Pharmacy to follow patient daily and order levels / make dose adjustments as appropriate.

## 2017-12-13 NOTE — ED NOTES
Pharmacy states have to have order for drainage bag, Spoke with patient and she does not know what type of bag is used to drain dialysis, patient states she would like to wait til in the morning and rest tonight.  Bedside report given to [facility], RN , patient V/S stable, patient resting comfortably, all questions answered, ENRIQUETA

## 2017-12-13 NOTE — PROGRESS NOTES
Outbound call to patient for follow-up visit. NN unable to reach. Message received that patient was seen in Ed for Abdominal pain at [facility]. Patient evaluated for Hypokalemia and sepsis. NN was able to reach patient's mother Saji Browne) at her home today after several attempts. Patient verified by 3 identifiers. Mother states the patient was admitted to the hospital last night. Summary:   She arrives complaining of abdominal pain and inability to eat. This is an ongoing problem, which she was recently admitted for. She says she did well only briefly at home. She has peritoneal dialysis, she is dwelling at present, due to drain tonight at 10pm.       Electronically signed by Dominick Peterson RN at 12/12/17 5440     Patient was last seen for OV follow-up by PCP on 12/8/17. NN will reach out to patient again at discharge. No current facility-administered medications for this visit. Current Outpatient Prescriptions   Medication Sig    oxyCODONE IR (ROXICODONE) 5 mg immediate release tablet Take 1 tab in AM and HALF to 1 tab in PM if needed on days where back pain is severe       fluticasone-vilanterol (BREO ELLIPTA) 200-25 mcg/dose inhaler Take 1 Puff by inhalation daily. Rinse mouth out after use    gemfibrozil (LOPID) 600 mg tablet     losartan (COZAAR) 50 mg tablet     metoprolol tartrate (LOPRESSOR) 50 mg tablet     potassium chloride SR (K-TAB) 20 mEq tablet Take 2 Tabs by mouth daily for 15 days.  carvedilol (COREG) 6.25 mg tablet Take 6.25 mg by mouth two (2) times daily (with meals).  apixaban (ELIQUIS) 5 mg tablet Take 5 mg by mouth two (2) times a day.  azaTHIOprine (IMURAN) 50 mg tablet     albuterol (PROVENTIL HFA, VENTOLIN HFA, PROAIR HFA) 90 mcg/actuation inhaler Take 1-2 Puffs by inhalation every four (4) hours as needed for Wheezing or Shortness of Breath.  l.acidoph-B.lactis-B.longum (FLORAJEN3) 460 mg (7.5-6- 1.5 bill. cell) cap cap Take 1 Cap by mouth Daily (before breakfast).     amLODIPine (NORVASC) 10 mg tablet Take 1 Tab by mouth daily.  AURYXIA 210 mg iron tablet TK 2 TS PO WITH MEALS    predniSONE (DELTASONE) 10 mg tablet Take 9 mg by mouth daily (with breakfast).  ergocalciferol (VITAMIN D2) 50,000 unit capsule Take 50,000 Units by mouth every seven (7) days.  hydroxychloroquine (PLAQUENIL) 200 mg tablet Take 200 mg by mouth two (2) times a day.  ondansetron hcl (ZOFRAN) 8 mg tablet Take 1 Tab by mouth every eight (8) hours as needed. For Nausea  Indications: GASTROPARESIS    allopurinol (ZYLOPRIM) 100 mg tablet Take 100 mg by mouth daily.  cyanocobalamin (VITAMIN B-12) 2,500 mcg sublingual tablet Take 1 Tab by mouth daily.  pantoprazole (PROTONIX) 40 mg tablet Take 1 Tab by mouth Daily (before breakfast).  senna (SENNA) 8.6 mg tablet Take 1 Tab by mouth daily as needed. Facility-Administered Medications Ordered in Other Visits   Medication Dose Route Frequency    allopurinol (ZYLOPRIM) tablet 100 mg  100 mg Oral DAILY    amLODIPine (NORVASC) tablet 10 mg  10 mg Oral DAILY    apixaban (ELIQUIS) tablet 5 mg  5 mg Oral BID    . PHARMACY TO SUBSTITUTE PER PROTOCOL    Per Protocol    azaTHIOprine (IMURAN) tablet 50 mg  50 mg Oral DAILY AFTER BREAKFAST    carvedilol (COREG) tablet 6.25 mg  6.25 mg Oral BID WITH MEALS    gemfibrozil (LOPID) tablet 600 mg  600 mg Oral ACB&D    hydroxychloroquine (PLAQUENIL) tablet 200 mg  200 mg Oral BID    losartan (COZAAR) tablet 50 mg  50 mg Oral DAILY    metoprolol tartrate (LOPRESSOR) tablet 50 mg  50 mg Oral DAILY    oxyCODONE IR (ROXICODONE) tablet 5 mg  5 mg Oral Q6H PRN    pantoprazole (PROTONIX) tablet 40 mg  40 mg Oral ACB    potassium chloride SR (KLOR-CON 10) tablet 40 mEq  40 mEq Oral DAILY    predniSONE (DELTASONE) tablet 9 mg  9 mg Oral DAILY WITH BREAKFAST    senna (SENOKOT) tablet 8.6 mg  1 Tab Oral QHS PRN    HYDROmorphone (DILAUDID) injection 1 mg  1 mg IntraVENous Q4H PRN    ondansetron (ZOFRAN) injection 4 mg  4 mg IntraVENous Q4H PRN    lactobac ac& pc-s.therm-b.anim (AJIT Q/RISAQUAD)  1 Cap Oral DAILY    albuterol (PROVENTIL VENTOLIN) nebulizer solution 2.5 mg  2.5 mg Nebulization Q4H PRN    arformoterol (BROVANA) neb solution 15 mcg  15 mcg Nebulization BID RT    And    budesonide (PULMICORT) 500 mcg/2 ml nebulizer suspension  500 mcg Nebulization BID RT    [START ON 12/14/2017] piperacillin-tazobactam (ZOSYN) 6.75 g in 0.9% sodium chloride 500 mL infusion  6.75 g IntraVENous Q24H    vancomycin dosing per pharmacy   Other Rx Dosing/Monitoring    acetaminophen (TYLENOL) tablet 650 mg  650 mg Oral Q4H PRN

## 2017-12-13 NOTE — PROGRESS NOTES
Hospitalist Progress Note      Hospital summary: 33 y.o lady with HTN, SLE w/ lupus nephritis, ESRD on PD, dyslipidemia, VTE, who presented with abdominal pain, initial work-up was consistent with peritonitis. She was recently discharged from here for anasarca due to hypoalbuminemia. Assessment/Plan:  Peritonitis with associated sepsis: (POA) Peritoneal fluid cell count showed WBC of 3769 with 90% neutrophils, gram stain with budding yeast. Tachycardia and leukocytosis on admission.  -continue IV vancomycin and Zosyn  -start IV fluconazole; last admission peritoneal fluid cultures from 11/27/17 grew light candida parapsilosis  -start gentle IV NS  -d/w Dr. Sophy Duran who will order intraperitoneal abx  -f/u blood cultures  -consult ID    Abdominal pain: (POA) likely due to peritonitis. However it is predominantly RUQ and CT of the abd showed hepatic steatosis and possible thickening of the right colon. SLE:  -azathioprine, Plaquenil, low-dose prednisone    ESRD: on PD  -nephrology consulted  -continue cinacalcet    HTN:  -continue carvedilol, losartan, amlidipine    Hyperlipidemia:  -continue gemfibrozil and     History of VTE:  -continue apixaban    Code status: full  DVT prophylaxis: on apixaban  Disposition: likely home when medically appropriate  ----------------------------------------------    CC: abdominal pain    S: abdominal pain is about the same as yesterday, worse in the RUQ, no n/v/d, no dyspnea. Review of Systems:  Pertinent items are noted in HPI.     O:  Visit Vitals    /82 (BP Patient Position: At rest)    Pulse (!) 123    Temp 98.9 °F (37.2 °C)    Resp 16    Ht 5' 5\" (1.651 m)    Wt 57.6 kg (127 lb)    SpO2 94%    BMI 21.13 kg/m2       PHYSICAL EXAM:  Gen: NAD, chronically-ill appearing  HEENT: anicteric sclerae, normal conjunctiva, oropharynx clear, MM moist  Neck: supple, trachea midline, no adenopathy  Heart: RR, tachycardic, no MRG, no JVD, no peripheral edema  Lungs: CTA b/l, non-labored respirations  Abd: soft, moderate diffuse tenderness most significant in the RUQ, PD cath in place no surrounding purulence, ND, BS+  Extr: warm  Skin: dry  Neuro: CN II-XII grossly intact, normal speech, moves all extremities  Psych: normal mood, appropriate affect    No intake or output data in the 24 hours ending 12/13/17 1257     Recent labs & imaging reviewed:  Recent Labs      12/13/17 0438 12/12/17   1943   WBC  15.9*  19.0*   HGB  9.5*  11.2*   HCT  27.5*  32.4*   PLT  468*  522*     Recent Labs      12/13/17 0438 12/12/17 2048 12/12/17 2045   NA  132*  131*  132*   K  3.5  2.8*  2.9*   CL  96*  92*  93*   CO2  26  29  29   BUN  36*  32*  33*   CREA  7.96*  7.88*  7.90*   GLU  83  86  86   CA  7.9*  7.8*  7.8*   MG  1.4*   --    --    PHOS  3.7   --    --      Recent Labs      12/13/17 0438 12/12/17 2048 12/12/17 2045   SGOT  7*  9*  10*   ALT  <6*  7*  7*   AP  89  95  99   TBILI  0.6  0.6  0.6   TP  6.1*  6.6  6.6   ALB  0.9*  1.0*  1.0*   GLOB  5.2*  5.6*  5.6*   LPSE   --   78   --        Lab Results   Component Value Date/Time    Folate 3.9 11/06/2017 11:49 AM        Lab Results   Component Value Date/Time    Cholesterol, total 117 09/25/2015 02:02 PM    HDL Cholesterol 31 09/25/2015 02:02 PM    LDL, calculated 57 09/25/2015 02:02 PM    Triglyceride 146 09/25/2015 02:02 PM    CHOL/HDL Ratio 7.7 05/31/2009 10:30 AM     Lab Results   Component Value Date/Time    Glucose (POC) 95 09/16/2015 12:08 PM    Glucose (POC) 116 09/03/2013 06:20 AM    Glucose (POC) 151 09/02/2013 09:07 PM    Glucose (POC) 162 09/02/2013 04:57 PM    Glucose (POC) 133 09/02/2013 11:58 AM    Glucose (POC) 203 09/01/2013 11:02 PM     Lab Results   Component Value Date/Time    Color YELLOW/STRAW 08/12/2016 09:06 PM    Appearance CLEAR 08/12/2016 09:06 PM    Specific gravity 1.017 08/12/2016 09:06 PM    Specific gravity 1.010 07/11/2016 12:44 PM    pH (UA) 7.0 08/12/2016 09:06 PM    Protein 300 08/12/2016 09:06 PM    Glucose NEGATIVE  08/12/2016 09:06 PM    Ketone TRACE 08/12/2016 09:06 PM    Bilirubin NEGATIVE  07/11/2016 12:44 PM    Urobilinogen 1.0 08/12/2016 09:06 PM    Nitrites NEGATIVE  08/12/2016 09:06 PM    Leukocyte Esterase NEGATIVE  08/12/2016 09:06 PM    Epithelial cells MODERATE 08/12/2016 09:06 PM    Bacteria 1+ 08/12/2016 09:06 PM    WBC 0-4 08/12/2016 09:06 PM    RBC 0-5 08/12/2016 09:06 PM       Med list reviewed  Current Facility-Administered Medications   Medication Dose Route Frequency    allopurinol (ZYLOPRIM) tablet 100 mg  100 mg Oral DAILY    amLODIPine (NORVASC) tablet 10 mg  10 mg Oral DAILY    apixaban (ELIQUIS) tablet 5 mg  5 mg Oral BID    azaTHIOprine (IMURAN) tablet 50 mg  50 mg Oral DAILY AFTER BREAKFAST    hydroxychloroquine (PLAQUENIL) tablet 200 mg  200 mg Oral BID    losartan (COZAAR) tablet 50 mg  50 mg Oral DAILY    oxyCODONE IR (ROXICODONE) tablet 5 mg  5 mg Oral Q6H PRN    pantoprazole (PROTONIX) tablet 40 mg  40 mg Oral ACB    senna (SENOKOT) tablet 8.6 mg  1 Tab Oral QHS PRN    HYDROmorphone (DILAUDID) injection 1 mg  1 mg IntraVENous Q4H PRN    ondansetron (ZOFRAN) injection 4 mg  4 mg IntraVENous Q4H PRN    lactobac ac& pc-s.therm-b.anim (AJIT Q/RISAQUAD)  1 Cap Oral DAILY    albuterol (PROVENTIL VENTOLIN) nebulizer solution 2.5 mg  2.5 mg Nebulization Q4H PRN    arformoterol (BROVANA) neb solution 15 mcg  15 mcg Nebulization BID RT    And    budesonide (PULMICORT) 500 mcg/2 ml nebulizer suspension  500 mcg Nebulization BID RT    [START ON 12/14/2017] piperacillin-tazobactam (ZOSYN) 6.75 g in 0.9% sodium chloride 500 mL infusion  6.75 g IntraVENous Q24H    vancomycin dosing per pharmacy   Other Rx Dosing/Monitoring    carvedilol (COREG) tablet 12.5 mg  12.5 mg Oral BID WITH MEALS    [START ON 12/14/2017] gemfibrozil (LOPID) tablet 300 mg  300 mg Oral DAILY    [START ON 12/14/2017] predniSONE (DELTASONE) tablet 5 mg  5 mg Oral DAILY WITH BREAKFAST    cinacalcet (SENSIPAR) tablet 30 mg  30 mg Oral DAILY WITH DINNER    fluconazole (DIFLUCAN) 200mg/100 mL IVPB (premix)  200 mg IntraVENous Q24H    acetaminophen (TYLENOL) tablet 650 mg  650 mg Oral Q4H PRN       Care Plan discussed with:  Patient/Family and Consultant nephrology    Shy Barber MD  Internal Medicine  Date of Service: 12/13/2017

## 2017-12-14 LAB
ANION GAP SERPL CALC-SCNC: 11 MMOL/L (ref 5–15)
BASOPHILS # BLD: 0 K/UL (ref 0–0.1)
BASOPHILS NFR BLD: 0 % (ref 0–1)
BUN SERPL-MCNC: 38 MG/DL (ref 6–20)
BUN/CREAT SERPL: 6 (ref 12–20)
CALCIUM SERPL-MCNC: 5.9 MG/DL (ref 8.5–10.1)
CHLORIDE SERPL-SCNC: 105 MMOL/L (ref 97–108)
CO2 SERPL-SCNC: 22 MMOL/L (ref 21–32)
CREAT SERPL-MCNC: 6.83 MG/DL (ref 0.55–1.02)
DIFFERENTIAL METHOD BLD: ABNORMAL
EOSINOPHIL # BLD: 0.1 K/UL (ref 0–0.4)
EOSINOPHIL NFR BLD: 1 % (ref 0–7)
ERYTHROCYTE [DISTWIDTH] IN BLOOD BY AUTOMATED COUNT: 18.2 % (ref 11.5–14.5)
GLUCOSE SERPL-MCNC: 71 MG/DL (ref 65–100)
HCT VFR BLD AUTO: 21 % (ref 35–47)
HGB BLD-MCNC: 7 G/DL (ref 11.5–16)
LYMPHOCYTES # BLD: 1.4 K/UL (ref 0.8–3.5)
LYMPHOCYTES NFR BLD: 11 % (ref 12–49)
MAGNESIUM SERPL-MCNC: 1.2 MG/DL (ref 1.6–2.4)
MCH RBC QN AUTO: 27.9 PG (ref 26–34)
MCHC RBC AUTO-ENTMCNC: 33.3 G/DL (ref 30–36.5)
MCV RBC AUTO: 83.7 FL (ref 80–99)
MONOCYTES # BLD: 0.4 K/UL (ref 0–1)
MONOCYTES NFR BLD: 3 % (ref 5–13)
NEUTS SEG # BLD: 10.4 K/UL (ref 1.8–8)
NEUTS SEG NFR BLD: 85 % (ref 32–75)
PHOSPHATE SERPL-MCNC: 2.8 MG/DL (ref 2.6–4.7)
PLATELET # BLD AUTO: 366 K/UL (ref 150–400)
POTASSIUM SERPL-SCNC: 2.9 MMOL/L (ref 3.5–5.1)
RBC # BLD AUTO: 2.51 M/UL (ref 3.8–5.2)
RBC MORPH BLD: ABNORMAL
SODIUM SERPL-SCNC: 138 MMOL/L (ref 136–145)
WBC # BLD AUTO: 12.3 K/UL (ref 3.6–11)

## 2017-12-14 PROCEDURE — 83735 ASSAY OF MAGNESIUM: CPT | Performed by: HOSPITALIST

## 2017-12-14 PROCEDURE — 36600 WITHDRAWAL OF ARTERIAL BLOOD: CPT

## 2017-12-14 PROCEDURE — 86160 COMPLEMENT ANTIGEN: CPT | Performed by: INTERNAL MEDICINE

## 2017-12-14 PROCEDURE — 74011000258 HC RX REV CODE- 258: Performed by: HOSPITALIST

## 2017-12-14 PROCEDURE — 74011636637 HC RX REV CODE- 636/637: Performed by: HOSPITALIST

## 2017-12-14 PROCEDURE — 74011250636 HC RX REV CODE- 250/636: Performed by: INTERNAL MEDICINE

## 2017-12-14 PROCEDURE — 74011250637 HC RX REV CODE- 250/637: Performed by: INTERNAL MEDICINE

## 2017-12-14 PROCEDURE — 80048 BASIC METABOLIC PNL TOTAL CA: CPT | Performed by: HOSPITALIST

## 2017-12-14 PROCEDURE — 74011250637 HC RX REV CODE- 250/637: Performed by: HOSPITALIST

## 2017-12-14 PROCEDURE — 74011250636 HC RX REV CODE- 250/636: Performed by: HOSPITALIST

## 2017-12-14 PROCEDURE — 36415 COLL VENOUS BLD VENIPUNCTURE: CPT | Performed by: HOSPITALIST

## 2017-12-14 PROCEDURE — 85025 COMPLETE CBC W/AUTO DIFF WBC: CPT | Performed by: INTERNAL MEDICINE

## 2017-12-14 PROCEDURE — 84100 ASSAY OF PHOSPHORUS: CPT | Performed by: HOSPITALIST

## 2017-12-14 PROCEDURE — 74011000250 HC RX REV CODE- 250: Performed by: INTERNAL MEDICINE

## 2017-12-14 PROCEDURE — 80074 ACUTE HEPATITIS PANEL: CPT | Performed by: INTERNAL MEDICINE

## 2017-12-14 PROCEDURE — 86480 TB TEST CELL IMMUN MEASURE: CPT | Performed by: INTERNAL MEDICINE

## 2017-12-14 PROCEDURE — 94640 AIRWAY INHALATION TREATMENT: CPT

## 2017-12-14 PROCEDURE — 87389 HIV-1 AG W/HIV-1&-2 AB AG IA: CPT | Performed by: INTERNAL MEDICINE

## 2017-12-14 PROCEDURE — 90945 DIALYSIS ONE EVALUATION: CPT

## 2017-12-14 PROCEDURE — 3E1M39Z IRRIGATION OF PERITONEAL CAVITY USING DIALYSATE, PERCUTANEOUS APPROACH: ICD-10-PCS | Performed by: HOSPITALIST

## 2017-12-14 PROCEDURE — A4722 DIALYS SOL FLD VOL > 1999CC: HCPCS | Performed by: INTERNAL MEDICINE

## 2017-12-14 PROCEDURE — 65660000000 HC RM CCU STEPDOWN

## 2017-12-14 RX ORDER — LANOLIN ALCOHOL/MO/W.PET/CERES
400 CREAM (GRAM) TOPICAL 2 TIMES DAILY
Status: COMPLETED | OUTPATIENT
Start: 2017-12-14 | End: 2017-12-14

## 2017-12-14 RX ORDER — CARVEDILOL 6.25 MG/1
6.25 TABLET ORAL 2 TIMES DAILY WITH MEALS
Status: DISCONTINUED | OUTPATIENT
Start: 2017-12-14 | End: 2017-12-22 | Stop reason: HOSPADM

## 2017-12-14 RX ORDER — POTASSIUM CHLORIDE 750 MG/1
40 TABLET, FILM COATED, EXTENDED RELEASE ORAL
Status: COMPLETED | OUTPATIENT
Start: 2017-12-14 | End: 2017-12-14

## 2017-12-14 RX ORDER — MAGNESIUM SULFATE HEPTAHYDRATE 40 MG/ML
2 INJECTION, SOLUTION INTRAVENOUS ONCE
Status: COMPLETED | OUTPATIENT
Start: 2017-12-14 | End: 2017-12-15

## 2017-12-14 RX ORDER — POTASSIUM CHLORIDE 14.9 MG/ML
10 INJECTION INTRAVENOUS
Status: DISPENSED | OUTPATIENT
Start: 2017-12-14 | End: 2017-12-14

## 2017-12-14 RX ADMIN — PREDNISONE 5 MG: 5 TABLET ORAL at 09:18

## 2017-12-14 RX ADMIN — Medication 400 MG: at 12:50

## 2017-12-14 RX ADMIN — SODIUM CHLORIDE, SODIUM LACTATE, CALCIUM CHLORIDE, MAGNESIUM CHLORIDE AND DEXTROSE 2500 ML: 1.5; 538; 448; 18.3; 5.08 INJECTION, SOLUTION INTRAPERITONEAL at 18:00

## 2017-12-14 RX ADMIN — BUDESONIDE 500 MCG: 0.5 INHALANT RESPIRATORY (INHALATION) at 09:53

## 2017-12-14 RX ADMIN — GEMFIBROZIL 300 MG: 600 TABLET ORAL at 09:18

## 2017-12-14 RX ADMIN — HYDROMORPHONE HYDROCHLORIDE 1 MG: 2 INJECTION INTRAMUSCULAR; INTRAVENOUS; SUBCUTANEOUS at 20:43

## 2017-12-14 RX ADMIN — ALLOPURINOL 100 MG: 100 TABLET ORAL at 09:19

## 2017-12-14 RX ADMIN — SODIUM CHLORIDE, SODIUM LACTATE, CALCIUM CHLORIDE, MAGNESIUM CHLORIDE AND DEXTROSE 2500 ML: 1.5; 538; 448; 18.3; 5.08 INJECTION, SOLUTION INTRAPERITONEAL at 10:14

## 2017-12-14 RX ADMIN — CINACALCET HYDROCHLORIDE 30 MG: 30 TABLET, COATED ORAL at 18:21

## 2017-12-14 RX ADMIN — APIXABAN 5 MG: 5 TABLET, FILM COATED ORAL at 09:19

## 2017-12-14 RX ADMIN — HYDROMORPHONE HYDROCHLORIDE 1 MG: 2 INJECTION INTRAMUSCULAR; INTRAVENOUS; SUBCUTANEOUS at 04:14

## 2017-12-14 RX ADMIN — HYDROXYCHLOROQUINE SULFATE 200 MG: 200 TABLET, FILM COATED ORAL at 09:19

## 2017-12-14 RX ADMIN — SODIUM CHLORIDE, SODIUM LACTATE, CALCIUM CHLORIDE, MAGNESIUM CHLORIDE AND DEXTROSE 2500 ML: 1.5; 538; 448; 18.3; 5.08 INJECTION, SOLUTION INTRAPERITONEAL at 14:00

## 2017-12-14 RX ADMIN — VANCOMYCIN HYDROCHLORIDE: 1 INJECTION, POWDER, LYOPHILIZED, FOR SOLUTION INTRAVENOUS at 01:30

## 2017-12-14 RX ADMIN — Medication 400 MG: at 18:20

## 2017-12-14 RX ADMIN — HYDROXYCHLOROQUINE SULFATE 200 MG: 200 TABLET, FILM COATED ORAL at 18:21

## 2017-12-14 RX ADMIN — HYDROMORPHONE HYDROCHLORIDE 1 MG: 2 INJECTION INTRAMUSCULAR; INTRAVENOUS; SUBCUTANEOUS at 10:24

## 2017-12-14 RX ADMIN — CALCIUM GLUCONATE 1 G: 94 INJECTION, SOLUTION INTRAVENOUS at 12:53

## 2017-12-14 RX ADMIN — AZATHIOPRINE 50 MG: 50 TABLET ORAL at 10:38

## 2017-12-14 RX ADMIN — POTASSIUM CHLORIDE 10 MEQ: 200 INJECTION, SOLUTION INTRAVENOUS at 12:00

## 2017-12-14 RX ADMIN — Medication 1 CAPSULE: at 09:18

## 2017-12-14 RX ADMIN — SODIUM CHLORIDE, SODIUM LACTATE, CALCIUM CHLORIDE, MAGNESIUM CHLORIDE AND DEXTROSE 2500 ML: 1.5; 538; 448; 18.3; 5.08 INJECTION, SOLUTION INTRAPERITONEAL at 23:00

## 2017-12-14 RX ADMIN — MAGNESIUM SULFATE HEPTAHYDRATE 2 G: 40 INJECTION, SOLUTION INTRAVENOUS at 10:15

## 2017-12-14 RX ADMIN — POTASSIUM CHLORIDE 10 MEQ: 200 INJECTION, SOLUTION INTRAVENOUS at 14:00

## 2017-12-14 RX ADMIN — CARVEDILOL 6.25 MG: 6.25 TABLET, FILM COATED ORAL at 18:21

## 2017-12-14 RX ADMIN — ARFORMOTEROL TARTRATE 15 MCG: 15 SOLUTION RESPIRATORY (INHALATION) at 20:32

## 2017-12-14 RX ADMIN — POTASSIUM CHLORIDE 40 MEQ: 750 TABLET, FILM COATED, EXTENDED RELEASE ORAL at 12:49

## 2017-12-14 RX ADMIN — FLUCONAZOLE 200 MG: 2 INJECTION INTRAVENOUS at 14:00

## 2017-12-14 RX ADMIN — SODIUM CHLORIDE, SODIUM LACTATE, CALCIUM CHLORIDE, MAGNESIUM CHLORIDE AND DEXTROSE 2500 ML: 1.5; 538; 448; 18.3; 5.08 INJECTION, SOLUTION INTRAPERITONEAL at 11:00

## 2017-12-14 RX ADMIN — OXYCODONE HYDROCHLORIDE 5 MG: 5 TABLET ORAL at 19:28

## 2017-12-14 RX ADMIN — ARFORMOTEROL TARTRATE 15 MCG: 15 SOLUTION RESPIRATORY (INHALATION) at 09:53

## 2017-12-14 RX ADMIN — POTASSIUM CHLORIDE 10 MEQ: 200 INJECTION, SOLUTION INTRAVENOUS at 13:00

## 2017-12-14 RX ADMIN — APIXABAN 5 MG: 5 TABLET, FILM COATED ORAL at 18:20

## 2017-12-14 RX ADMIN — BUDESONIDE 500 MCG: 0.5 INHALANT RESPIRATORY (INHALATION) at 20:32

## 2017-12-14 RX ADMIN — PANTOPRAZOLE SODIUM 40 MG: 40 TABLET, DELAYED RELEASE ORAL at 09:18

## 2017-12-14 NOTE — CDMP QUERY
#2    Dr. Janette Cruz,     Please clarify if this patient is being treated/managed for:    =>Severe protein calorie malnutrition in the setting of muscle wasting & decreased nutritional intake requiring RD monitoring and nutritional supplements  =>Other Explanation of clinical findings  =>Unable to Determine (no explanation of clinical findings)    The medical record reflects the following clinical findings, treatment, and risk factors:    Risk Factors: 34 y/o pt  Clinical Indicators: Per RD:   Meets Criteria for Acute Malnutrition   Severe Malnutrition, as evidenced by: Moderate muscle wasting, loss of subcutaneous fat                         Nutritional intake of <50% of recommended intake for >5 days                         Weight loss of >1-2% in 1 week, >5% in 1 month, >7.5% in 3 months, or >10% in 6 months  Treatment: RD monitoring, nutritional supplements    Please clarify and document your clinical opinion in the progress notes and discharge summary including the definitive and/or presumptive diagnosis, (suspected or probable), related to the above clinical findings. Please include clinical findings supporting your diagnosis.     Thank you,   Mindy Ascencio, [address] [facility]

## 2017-12-14 NOTE — PROGRESS NOTES
0800 - Report received from night nurse. Peritoneal Dialysis fluid weighed both in (2.6kg) and out (2.5kg) as no PD scale available. PD dialysis started, labs drawn. Pt tolerated well. 1030 - Dilaudid given for abdominal pain with good results.    1100 - Lab results called to MD

## 2017-12-14 NOTE — PROGRESS NOTES
Problem: Falls - Risk of  Goal: *Absence of Falls  Document Alex Fall Risk and appropriate interventions in the flowsheet.    Outcome: Progressing Towards Goal  Fall Risk Interventions:  Mobility Interventions: Assess mobility with egress test         Medication Interventions: Assess postural VS orthostatic hypotension, Patient to call before getting OOB    Elimination Interventions: Call light in reach, Patient to call for help with toileting needs

## 2017-12-14 NOTE — PROGRESS NOTES
Spiritual Care Partner Volunteer visited patient in Rm 406 on 12/14/17.   Documented by:  Chaplain Barrera MDiv, MS, [address] [facility] [employer]  [address] [place] [address] ([id number])

## 2017-12-14 NOTE — CONSULTS
Vascular Surgery Consult    Reason: infected peritoneal dialysis catheter    HPI:  Patient is a 34 yo female admitted with abdominal pain and WBC count; she has been found to have fungal peritonitis; we are asked to remove her peritoneal dialysis catheter. This was placed by Dr. Alon Mccoy 2 years ago. --she is on Eliquis for a recent PE  Patient Active Problem List   Diagnosis Code    Lupus L93.0    Lupus nephritis ([facility] M32.14    Encounter for monitoring opioid maintenance therapy Z51.81, Z79.891    Malignant hypertension I10    ESRD on peritoneal dialysis ([facility] N18.6, Z99.2    Anemia of renal disease D63.1    Dependence on peritoneal dialysis ([facility] Z99.2    ACP (advance care planning) Z71.89    Chronic bilateral low back pain without sciatica M54.5, G89.29    Hypertension I10    Hypokalemia E87.6    Hypomagnesemia E83.42    Hypocalcemia E83.51    History of pulmonary embolism Z86.711    Sepsis ([facility] A41.9     No current facility-administered medications on file prior to encounter. Current Outpatient Prescriptions on File Prior to Encounter   Medication Sig Dispense Refill    oxyCODONE IR (ROXICODONE) 5 mg immediate release tablet Take 1 tab in AM and HALF to 1 tab in PM if needed on days where back pain is severe    40 Tab 0    fluticasone-vilanterol (BREO ELLIPTA) 200-25 mcg/dose inhaler Take 1 Puff by inhalation daily. Rinse mouth out after use 1 Inhaler 5    gemfibrozil (LOPID) 600 mg tablet Take 300 mg by mouth daily.  losartan (COZAAR) 50 mg tablet Take 50 mg by mouth daily.  carvedilol (COREG) 6.25 mg tablet Take 12.5 mg by mouth two (2) times daily (with meals).  apixaban (ELIQUIS) 5 mg tablet Take 5 mg by mouth two (2) times a day.  azaTHIOprine (IMURAN) 50 mg tablet Take 50 mg by mouth daily.       albuterol (PROVENTIL HFA, VENTOLIN HFA, PROAIR HFA) 90 mcg/actuation inhaler Take 1-2 Puffs by inhalation every four (4) hours as needed for Wheezing or Shortness of Breath. 1 Inhaler 2    amLODIPine (NORVASC) 10 mg tablet Take 1 Tab by mouth daily. 30 Tab 5    hydroxychloroquine (PLAQUENIL) 200 mg tablet Take 200 mg by mouth two (2) times a day.  allopurinol (ZYLOPRIM) 100 mg tablet Take 100 mg by mouth daily.  cyanocobalamin (VITAMIN B-12) 2,500 mcg sublingual tablet Take 1 Tab by mouth daily. 90 Tab 1    pantoprazole (PROTONIX) 40 mg tablet Take 1 Tab by mouth Daily (before breakfast). 30 Tab 0    senna (SENNA) 8.6 mg tablet Take 1 Tab by mouth daily as needed. PE:  --NAD, alert, oriented  --ctab  -RRR  --Abd: soft, mildly tender    Recent Results (from the past 12 hour(s))   PHOSPHORUS    Collection Time: 12/14/17  9:40 AM   Result Value Ref Range    Phosphorus 2.8 2.6 - 4.7 MG/DL   METABOLIC PANEL, BASIC    Collection Time: 12/14/17  9:43 AM   Result Value Ref Range    Sodium 138 136 - 145 mmol/L    Potassium 2.9 (L) 3.5 - 5.1 mmol/L    Chloride 105 97 - 108 mmol/L    CO2 22 21 - 32 mmol/L    Anion gap 11 5 - 15 mmol/L    Glucose 71 65 - 100 mg/dL    BUN 38 (H) 6 - 20 MG/DL    Creatinine 6.83 (H) 0.55 - 1.02 MG/DL    BUN/Creatinine ratio 6 (L) 12 - 20      GFR est AA 9 (L) >60 ml/min/1.73m2    GFR est non-AA 7 (L) >60 ml/min/1.73m2    Calcium 5.9 (LL) 8.5 - 10.1 MG/DL   MAGNESIUM    Collection Time: 12/14/17  9:43 AM   Result Value Ref Range    Magnesium 1.2 (L) 1.6 - 2.4 mg/dL   CBC WITH AUTOMATED DIFF    Collection Time: 12/14/17  9:43 AM   Result Value Ref Range    WBC 12.3 (H) 3.6 - 11.0 K/uL    RBC 2.51 (L) 3.80 - 5.20 M/uL    HGB 7.0 (L) 11.5 - 16.0 g/dL    HCT 21.0 (L) 35.0 - 47.0 %    MCV 83.7 80.0 - 99.0 FL    MCH 27.9 26.0 - 34.0 PG    MCHC 33.3 30.0 - 36.5 g/dL    RDW 18.2 (H) 11.5 - 14.5 %    PLATELET 416 704 - 714 K/uL    NEUTROPHILS 85 (H) 32 - 75 %    LYMPHOCYTES 11 (L) 12 - 49 %    MONOCYTES 3 (L) 5 - 13 %    EOSINOPHILS 1 0 - 7 %    BASOPHILS 0 0 - 1 %    ABS. NEUTROPHILS 10.4 (H) 1.8 - 8.0 K/UL    ABS.  LYMPHOCYTES 1.4 0.8 - 3.5 K/UL ABS. MONOCYTES 0.4 0.0 - 1.0 K/UL    ABS. EOSINOPHILS 0.1 0.0 - 0.4 K/UL    ABS.  BASOPHILS 0.0 0.0 - 0.1 K/UL    DF SMEAR SCANNED      RBC COMMENTS ANISOCYTOSIS  1+        RBC COMMENTS HYPOCHROMIA  1+        RBC COMMENTS OVALOCYTES  PRESENT        RBC COMMENTS TARGET CELLS  PRESENT         A/P  --have discussed with Dr. Ann Felix; will plan to remove PD catheter in OR on Monday afternoon  --will stop eliquis on Sunday

## 2017-12-14 NOTE — CDMP QUERY
Dr. Santosh Casillas,     Please clarify if this patient is being treated/managed for:    =>Infection due to PD catheter in the setting of PD catheter requiring IV abxs  =>Other Explanation of clinical findings  =>Unable to Determine (no explanation of clinical findings)    The medical record reflects the following clinical findings, treatment, and risk factors:    Risk Factors: 34 y/o pt with a hx of PD catheter  Clinical Indicators: Admitted with peritonitis and sepsis  Treatment: IV abxs    Please clarify and document your clinical opinion in the progress notes and discharge summary including the definitive and/or presumptive diagnosis, (suspected or probable), related to the above clinical findings. Please include clinical findings supporting your diagnosis.     Thank you,   Belem Black, [facility]

## 2017-12-14 NOTE — PROGRESS NOTES
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     Plan/Recommendations: Will do CAPD in house; use 1.5% dianneal  5 exchnages per day  S/p IP Vanc + Cefepime on 12-13  If negative culture, likely wont need further IP ABx. Vanco should last 3-5 days  Ct antifungal  Spoke with Dr. Jorje Minaya- will need to remove PD catheter and transition to HD  Will d/w ID what would be best time to place permacath (she is not bacteremic)  If we cannot place permacath for now, then can plan henry tomm and then plan permcath next week  Further ABx based on culture results  PD Fluid cell count- repeat in AM-not send today  Off norvasc. Stop losartan. Reduce coreg  Decrease IVF to 50 ml/hr  Check Complements in AM-not done today    Subjective:  Feels much better. Denies abd pain. No fevers    ROS:   No nausea, no vomiting  No chest pain, no shortness of breath    Exam:  Visit Vitals    BP 95/73 (BP 1 Location: Right arm, BP Patient Position: At rest)    Pulse 77    Temp 98.2 °F (36.8 °C)    Resp 18    Ht 5' 5\" (1.651 m)    Wt 58.8 kg (129 lb 10.1 oz)    SpO2 94%    BMI 21.57 kg/m2       Gen: NAD  HEENT: No icterus, mmm,  AT/NC  Neck: No JVD  Lungs/Chest wall: Clear. Equal BS. No respiratory distress  Cardiovascular: Regular rate, normal rhythm. Abdomen/: Soft, NT,  Exit site clean, BS+  Ext: No peripheral edema  Skin: warm, dry  CNS: alert and awake.      Current Facility-Administered Medications   Medication Dose Route Frequency Last Dose    magnesium sulfate 2 g/50 ml IVPB (premix or compounded)  2 g IntraVENous ONCE      carvedilol (COREG) tablet 6.25 mg  6.25 mg Oral BID WITH MEALS      allopurinol (ZYLOPRIM) tablet 100 mg  100 mg Oral DAILY 100 mg at 12/14/17 0919    apixaban (ELIQUIS) tablet 5 mg  5 mg Oral BID 5 mg at 12/14/17 0919    azaTHIOprine (IMURAN) tablet 50 mg  50 mg Oral DAILY AFTER BREAKFAST 50 mg at 12/13/17 0901    hydroxychloroquine (PLAQUENIL) tablet 200 mg  200 mg Oral  mg at 12/14/17 0919    oxyCODONE IR (ROXICODONE) tablet 5 mg  5 mg Oral Q6H PRN 5 mg at 12/13/17 1524    pantoprazole (PROTONIX) tablet 40 mg  40 mg Oral ACB 40 mg at 12/14/17 0918    senna (SENOKOT) tablet 8.6 mg  1 Tab Oral QHS PRN      HYDROmorphone (DILAUDID) injection 1 mg  1 mg IntraVENous Q4H PRN 1 mg at 12/14/17 0414    ondansetron (ZOFRAN) injection 4 mg  4 mg IntraVENous Q4H PRN 4 mg at 12/13/17 0858    lactobac ac& pc-s.therm-b.anim (AJIT Q/RISAQUAD)  1 Cap Oral DAILY 1 Cap at 12/14/17 0918    albuterol (PROVENTIL VENTOLIN) nebulizer solution 2.5 mg  2.5 mg Nebulization Q4H PRN      arformoterol (BROVANA) neb solution 15 mcg  15 mcg Nebulization BID RT 15 mcg at 12/14/17 0953    And    budesonide (PULMICORT) 500 mcg/2 ml nebulizer suspension  500 mcg Nebulization BID  mcg at 12/14/17 0953    gemfibrozil (LOPID) tablet 300 mg  300 mg Oral DAILY 300 mg at 12/14/17 0918    predniSONE (DELTASONE) tablet 5 mg  5 mg Oral DAILY WITH BREAKFAST 5 mg at 12/14/17 0918    cinacalcet (SENSIPAR) tablet 30 mg  30 mg Oral DAILY WITH DINNER 30 mg at 12/13/17 1811    fluconazole (DIFLUCAN) 200mg/100 mL IVPB (premix)  200 mg IntraVENous Q24H 200 mg at 12/13/17 1524    0.9% sodium chloride infusion  75 mL/hr IntraVENous CONTINUOUS 75 mL/hr at 12/13/17 8381    peritoneal dialysis DEXTROSE 1.5% (2.5 mEq/L low calcium) solution 2,500 mL  2,500 mL IntraPERitoneal 5XD      acetaminophen (TYLENOL) tablet 650 mg  650 mg Oral Q4H PRN         Labs/Data:    Lab Results   Component Value Date/Time    WBC 15.9 12/13/2017 04:38 AM    Hemoglobin (POC) 11.2 09/16/2015 12:08 PM    HGB 9.5 12/13/2017 04:38 AM    Hematocrit (POC) 33 09/16/2015 12:08 PM    HCT 27.5 12/13/2017 04:38 AM    PLATELET 255 52/36/3026 04:38 AM    MCV 80.4 12/13/2017 04:38 AM       Lab Results   Component Value Date/Time    Sodium 132 12/13/2017 04:38 AM    Potassium 3.5 12/13/2017 04:38 AM    Chloride 96 12/13/2017 04:38 AM    CO2 26 12/13/2017 04:38 AM    Anion gap 10 12/13/2017 04:38 AM    Glucose 83 12/13/2017 04:38 AM    BUN 36 12/13/2017 04:38 AM    Creatinine 7.96 12/13/2017 04:38 AM    BUN/Creatinine ratio 5 12/13/2017 04:38 AM    GFR est AA 7 12/13/2017 04:38 AM    GFR est non-AA 6 12/13/2017 04:38 AM    Calcium 7.9 12/13/2017 04:38 AM       Wt Readings from Last 3 Encounters:   12/13/17 58.8 kg (129 lb 10.1 oz)   12/08/17 65.1 kg (143 lb 9.6 oz)   12/08/17 64.9 kg (143 lb)         Intake/Output Summary (Last 24 hours) at 12/14/17 1011  Last data filed at 12/14/17 0420   Gross per 24 hour   Intake              790 ml   Output             5575 ml   Net            -4785 ml       Patient seen and examined. Chart reviewed. Labs, data and other pertinent notes reviewed in last 24 hrs. PMH/SH/FH reviewed and unchanged compared to H&P    Discussed with pt, RN, Dr. Juana El and Dr. Cresencio Newberry.  Will d/w ID       Maggie Shin MD

## 2017-12-14 NOTE — PROGRESS NOTES
Kirill Blackwell  is a 33 y. o.female  with a history of SLE, lupus nephritis leading to ESRD /peritoneal dialysis/HTN  Admitted with abdominal pain and inabiltiy to eat  Multiple hospitalizations in the past few months  OCt 14-17 /2017 for left lower lobe pneumonia/effusion- was found to have PE on the rt side  10/27-10/30 possible pneumonia- same infiltrate left lower lobe- sent on augmentin     11/26-/11/28 for b/l leg swelling and abdominal swelling  She had ascitic fluid sent for culture during that admission  But she was discharged before the result came back- and it has candida albicans  SHe is now admitted with abdominal pain, poor appetite  Pt says the pain is more on the rt side, sharp can come with movement- no radiation- lasts a few minutes  She has no appetite- can become nauseous if she eats. She has lost 60 pounds she says since the past few months  She rarely makes urine  No diarrhea  Has a baseline cough of few months- does not bring up any sputum. She has been on peritoneal dialysis since 2015- had cath placed in 2015  LMP 2 yrs ago  Was sexually active in august 2017  HIV [address] 72 negative in 2015  Last travel was to [place] in July 2017      Subjective  Not much of a change  Says she has no appetite  Objective:   VITALS:    Visit Vitals    BP 99/69 (BP 1 Location: Right arm, BP Patient Position: At rest)    Pulse 89    Temp 98.2 °F (36.8 °C)    Resp 16    Ht 5' 5\" (1.651 m)    Wt 129 lb 10.1 oz (58.8 kg)    SpO2 99%    BMI 21.57 kg/m2      PHYSICAL EXAM:   General:                    Alert, cooperative, no distress, appears stated age. Chronically ill     Lungs:                       B/l air entry  crepts bases     Heart:                                  s1s2  Abdomen:                  Soft- PD catheter  Tenderness over rt side  Extremities:               Extremities normal, atraumatic, no cyanosis. No edema.  No clubbing  Skin:                                    Dry skin- striae over arms/abdomen  Lymph:                      Cervical, supraclavicular normal.  Neurologic:                Grossly non-focal     Pertinent Labs  Wbc 19>15.9>12.3  Hb 9.5    Lactate 1  Albumin 0.9  Peritoneal fluid- 3769 WBC ( 90% N)  12/13  Peritoneal fluid-yeast   11/27- candida parapsilosis  IMAGING RESULTS:        Impression/Recommendation  33 yr female with SLE/ lupus nephritis/HTN on peritoneal dialysis  Admitted with abdominal pain     Fungal peritonitis due to candida in a patient with catheter for peritoneal dialysis- on fluconazole  The catheter will have to be removed  Blood culture pending  Also on vanco and cefepime as per nephrology- will continue till cultures finalilze     Weight loss- ? Due to ESRD and need for hemodialysis  ? Due to lupus  She is at risk for infections like TB secondary to her status and immunosuppressed state.  No contact with TB  Will check HIV and hepatitis panel     SLE-on imuran/plaquenil and prednisone     HTN-on losartan     PE- on elaquis     Hypoalbuminemia - from PD and poor nutrition   Chronic cough- no clinical evidence of PCP- will get sputum for culture    Discussed with patient  Spoke to  nephrologist- Radha Nano will be placed on Monday followed by PD catheter removal

## 2017-12-14 NOTE — PROGRESS NOTES
Problem: Discharge Planning  Goal: *Discharge to safe environment  Outcome: Progressing Towards Goal  Home with possible hemodialysis.

## 2017-12-14 NOTE — PROGRESS NOTES
Hospitalist Progress Note      Hospital summary: 33 y.o lady with HTN, SLE w/ lupus nephritis, ESRD on PD, dyslipidemia, VTE, who presented with abdominal pain, initial work-up was consistent with peritonitis. She was recently discharged from here for anasarca due to hypoalbuminemia. Assessment/Plan:  Peritonitis with associated sepsis: (POA) Peritoneal fluid cell count showed WBC of 3769 with 90% neutrophils, gram stain with budding yeast. Tachycardia and leukocytosis on admission.  -continue IV vancomycin, cefepime  -start IV fluconazole; last admission peritoneal fluid cultures from 11/27/17 grew light candida parapsilosis  -BP has normalized and tachycardia resolved, she is tolerating PO; will d/c IV NS later today  -f/u blood cultures  -ID, nephrology consulted. Will need PD catheter removal and likely [facility] for HD    Abdominal pain: (POA) likely due to peritonitis. Improving. CT of the abd showed hepatic steatosis and possible thickening of the right colon. Hypomagnesemia:   -replete    SLE:  -continue azathioprine, Plaquenil, low-dose prednisone    ESRD: on PD  -nephrology consulted  -continue cinacalcet    HTN:  -continue carvedilol, losartan  -holding amlodipine    Hyperlipidemia:  -continue gemfibrozil and     History of VTE:  -continue apixaban    Code status: full  DVT prophylaxis: on apixaban  Disposition: likely home when medically appropriate  ----------------------------------------------    CC: abdominal pain    S: abdominal pain is improved, no n/v/d, no dyspnea. AM labs not drawn today. Review of Systems:  Pertinent items are noted in HPI.     O:  Visit Vitals    BP 95/73 (BP 1 Location: Right arm, BP Patient Position: At rest)    Pulse 77    Temp 98.2 °F (36.8 °C)    Resp 18    Ht 5' 5\" (1.651 m)    Wt 58.8 kg (129 lb 10.1 oz)    SpO2 99%    BMI 21.57 kg/m2       PHYSICAL EXAM:  Gen: NAD, chronically-ill appearing  HEENT: anicteric sclerae, normal conjunctiva, oropharynx clear, MM moist  Neck: supple, trachea midline, no adenopathy  Heart: RR, tachycardic, no MRG, no JVD, no peripheral edema  Lungs: CTA b/l, non-labored respirations  Abd: soft, minimal tenderness in RUQ, PD cath, ND, BS+  Extr: warm  Skin: dry  Neuro: CN II-XII grossly intact, normal speech, moves all extremities  Psych: normal mood, appropriate affect      Intake/Output Summary (Last 24 hours) at 12/14/17 0908  Last data filed at 12/14/17 0420   Gross per 24 hour   Intake              690 ml   Output             5575 ml   Net            -4885 ml        Recent labs & imaging reviewed:  Recent Labs      12/13/17 0438  12/12/17   1943   WBC  15.9*  19.0*   HGB  9.5*  11.2*   HCT  27.5*  32.4*   PLT  468*  522*     Recent Labs      12/13/17 0438 12/12/17 2048 12/12/17 2045   NA  132*  131*  132*   K  3.5  2.8*  2.9*   CL  96*  92*  93*   CO2  26  29  29   BUN  36*  32*  33*   CREA  7.96*  7.88*  7.90*   GLU  83  86  86   CA  7.9*  7.8*  7.8*   MG  1.4*   --    --    PHOS  3.7   --    --      Recent Labs      12/13/17 0438 12/12/17 2048 12/12/17 2045   SGOT  7*  9*  10*   ALT  <6*  7*  7*   AP  89  95  99   TBILI  0.6  0.6  0.6   TP  6.1*  6.6  6.6   ALB  0.9*  1.0*  1.0*   GLOB  5.2*  5.6*  5.6*   LPSE   --   78   --        Lab Results   Component Value Date/Time    Folate 3.9 11/06/2017 11:49 AM        Lab Results   Component Value Date/Time    Cholesterol, total 117 09/25/2015 02:02 PM    HDL Cholesterol 31 09/25/2015 02:02 PM    LDL, calculated 57 09/25/2015 02:02 PM    Triglyceride 146 09/25/2015 02:02 PM    CHOL/HDL Ratio 7.7 05/31/2009 10:30 AM     Lab Results   Component Value Date/Time    Glucose (POC) 95 09/16/2015 12:08 PM    Glucose (POC) 116 09/03/2013 06:20 AM    Glucose (POC) 151 09/02/2013 09:07 PM    Glucose (POC) 162 09/02/2013 04:57 PM    Glucose (POC) 133 09/02/2013 11:58 AM    Glucose (POC) 203 09/01/2013 11:02 PM     Lab Results   Component Value Date/Time Color YELLOW/STRAW 08/12/2016 09:06 PM    Appearance CLEAR 08/12/2016 09:06 PM    Specific gravity 1.017 08/12/2016 09:06 PM    Specific gravity 1.010 07/11/2016 12:44 PM    pH (UA) 7.0 08/12/2016 09:06 PM    Protein 300 08/12/2016 09:06 PM    Glucose NEGATIVE  08/12/2016 09:06 PM    Ketone TRACE 08/12/2016 09:06 PM    Bilirubin NEGATIVE  07/11/2016 12:44 PM    Urobilinogen 1.0 08/12/2016 09:06 PM    Nitrites NEGATIVE  08/12/2016 09:06 PM    Leukocyte Esterase NEGATIVE  08/12/2016 09:06 PM    Epithelial cells MODERATE 08/12/2016 09:06 PM    Bacteria 1+ 08/12/2016 09:06 PM    WBC 0-4 08/12/2016 09:06 PM    RBC 0-5 08/12/2016 09:06 PM       Med list reviewed  Current Facility-Administered Medications   Medication Dose Route Frequency    magnesium sulfate 2 g/50 ml IVPB (premix or compounded)  2 g IntraVENous ONCE    allopurinol (ZYLOPRIM) tablet 100 mg  100 mg Oral DAILY    apixaban (ELIQUIS) tablet 5 mg  5 mg Oral BID    azaTHIOprine (IMURAN) tablet 50 mg  50 mg Oral DAILY AFTER BREAKFAST    hydroxychloroquine (PLAQUENIL) tablet 200 mg  200 mg Oral BID    losartan (COZAAR) tablet 50 mg  50 mg Oral DAILY    oxyCODONE IR (ROXICODONE) tablet 5 mg  5 mg Oral Q6H PRN    pantoprazole (PROTONIX) tablet 40 mg  40 mg Oral ACB    senna (SENOKOT) tablet 8.6 mg  1 Tab Oral QHS PRN    HYDROmorphone (DILAUDID) injection 1 mg  1 mg IntraVENous Q4H PRN    ondansetron (ZOFRAN) injection 4 mg  4 mg IntraVENous Q4H PRN    lactobac ac& pc-s.therm-b.anim (AJIT Q/RISAQUAD)  1 Cap Oral DAILY    albuterol (PROVENTIL VENTOLIN) nebulizer solution 2.5 mg  2.5 mg Nebulization Q4H PRN    arformoterol (BROVANA) neb solution 15 mcg  15 mcg Nebulization BID RT    And    budesonide (PULMICORT) 500 mcg/2 ml nebulizer suspension  500 mcg Nebulization BID RT    carvedilol (COREG) tablet 12.5 mg  12.5 mg Oral BID WITH MEALS    gemfibrozil (LOPID) tablet 300 mg  300 mg Oral DAILY    predniSONE (DELTASONE) tablet 5 mg  5 mg Oral DAILY WITH BREAKFAST    cinacalcet (SENSIPAR) tablet 30 mg  30 mg Oral DAILY WITH DINNER    fluconazole (DIFLUCAN) 200mg/100 mL IVPB (premix)  200 mg IntraVENous Q24H    0.9% sodium chloride infusion  75 mL/hr IntraVENous CONTINUOUS    peritoneal dialysis DEXTROSE 1.5% (2.5 mEq/L low calcium) solution 2,500 mL  2,500 mL IntraPERitoneal 5XD    acetaminophen (TYLENOL) tablet 650 mg  650 mg Oral Q4H PRN       Care Plan discussed with:  Patient/Family and Consultant nephrology    Ezra Ozuna MD  Internal Medicine  Date of Service: 12/14/2017

## 2017-12-14 NOTE — PROGRESS NOTES
Care Management Interventions  PCP Verified by CM: Yes (KENNETH Chiu NP)  Mode of Transport at Discharge: Other (see comment)  Transition of Care Consult (CM Consult):  (To Be Determined)  MyChart Signup: No  Discharge Durable Medical Equipment: No  Physical Therapy Consult: No  Occupational Therapy Consult: No  Speech Therapy Consult: No  Current Support Network: Other  Confirm Follow Up Transport: Family  Plan discussed with Pt/Family/Caregiver: Yes  Freedom of Choice Offered:  (N/A)  Discharge Location  Discharge Placement: Home with family assistance    CM met with patient. Patient lives with her mother and father. Patient reports good family /social support. Patient has been on peritoneal dialysis at home. Patient expects return to independent functioning. Patient confirmed PCP, health insurance, and prescription coverage. Transport at discharge will be provided by family. No discharge planning needs presented at this time. Later, CM reviewed case with Dr Rowe Province Team 7 during Bedside Interdisciplinary Rounds. Per hospitalist, patient will be changed to hemodialysis until infection is gone. It is possible that patient will need to have outpatient hemodialysis arranged at discharge. Patient understood the plan. CM to continue to follow for discharge planning needs.

## 2017-12-14 NOTE — ROUTINE PROCESS
Bedside shift change report given to Jose A Singletary (oncoming nurse) by rDew Miles (offgoing nurse). Report included the following information SBAR.

## 2017-12-15 ENCOUNTER — APPOINTMENT (OUTPATIENT)
Dept: ULTRASOUND IMAGING | Age: 32
DRG: 981 | End: 2017-12-15
Attending: HOSPITALIST
Payer: MEDICARE

## 2017-12-15 LAB
ANION GAP SERPL CALC-SCNC: 8 MMOL/L (ref 5–15)
BASOPHILS # BLD: 0 K/UL (ref 0–0.1)
BASOPHILS NFR BLD: 0 % (ref 0–1)
BUN SERPL-MCNC: 37 MG/DL (ref 6–20)
BUN/CREAT SERPL: 5 (ref 12–20)
C3 SERPL-MCNC: 56 MG/DL (ref 82–167)
C4 SERPL-MCNC: 21 MG/DL (ref 14–44)
CALCIUM SERPL-MCNC: 7.1 MG/DL (ref 8.5–10.1)
CHLORIDE SERPL-SCNC: 100 MMOL/L (ref 97–108)
CO2 SERPL-SCNC: 24 MMOL/L (ref 21–32)
CREAT SERPL-MCNC: 6.73 MG/DL (ref 0.55–1.02)
EOSINOPHIL # BLD: 0.1 K/UL (ref 0–0.4)
EOSINOPHIL NFR BLD: 1 % (ref 0–7)
ERYTHROCYTE [DISTWIDTH] IN BLOOD BY AUTOMATED COUNT: 18.1 % (ref 11.5–14.5)
GLUCOSE SERPL-MCNC: 84 MG/DL (ref 65–100)
HAV IGM SER QL: NONREACTIVE
HBV CORE IGM SER QL: NONREACTIVE
HBV SURFACE AG SER QL: <0.1 INDEX
HBV SURFACE AG SER QL: NEGATIVE
HCT VFR BLD AUTO: 24.9 % (ref 35–47)
HCV AB SERPL QL IA: NONREACTIVE
HCV COMMENT,HCGAC: NORMAL
HGB BLD-MCNC: 8 G/DL (ref 11.5–16)
HIV 1+2 AB+HIV1 P24 AG SERPL QL IA: NONREACTIVE
HIV12 RESULT COMMENT, HHIVC: NORMAL
LYMPHOCYTES # BLD: 1.2 K/UL (ref 0.8–3.5)
LYMPHOCYTES NFR BLD: 7 % (ref 12–49)
MAGNESIUM SERPL-MCNC: 2 MG/DL (ref 1.6–2.4)
MCH RBC QN AUTO: 27 PG (ref 26–34)
MCHC RBC AUTO-ENTMCNC: 32.1 G/DL (ref 30–36.5)
MCV RBC AUTO: 84.1 FL (ref 80–99)
MONOCYTES # BLD: 0.6 K/UL (ref 0–1)
MONOCYTES NFR BLD: 3 % (ref 5–13)
NEUTS SEG # BLD: 15.4 K/UL (ref 1.8–8)
NEUTS SEG NFR BLD: 89 % (ref 32–75)
PHOSPHATE SERPL-MCNC: 2.3 MG/DL (ref 2.6–4.7)
PLATELET # BLD AUTO: 418 K/UL (ref 150–400)
POTASSIUM SERPL-SCNC: 3.7 MMOL/L (ref 3.5–5.1)
RBC # BLD AUTO: 2.96 M/UL (ref 3.8–5.2)
SODIUM SERPL-SCNC: 132 MMOL/L (ref 136–145)
SP1: NORMAL
SP2: NORMAL
SP3: NORMAL
WBC # BLD AUTO: 17.3 K/UL (ref 3.6–11)

## 2017-12-15 PROCEDURE — 74011250637 HC RX REV CODE- 250/637: Performed by: HOSPITALIST

## 2017-12-15 PROCEDURE — 87186 SC STD MICRODIL/AGAR DIL: CPT

## 2017-12-15 PROCEDURE — 74011250636 HC RX REV CODE- 250/636: Performed by: INTERNAL MEDICINE

## 2017-12-15 PROCEDURE — 36415 COLL VENOUS BLD VENIPUNCTURE: CPT | Performed by: INTERNAL MEDICINE

## 2017-12-15 PROCEDURE — 74011250637 HC RX REV CODE- 250/637: Performed by: INTERNAL MEDICINE

## 2017-12-15 PROCEDURE — 83735 ASSAY OF MAGNESIUM: CPT | Performed by: HOSPITALIST

## 2017-12-15 PROCEDURE — A4722 DIALYS SOL FLD VOL > 1999CC: HCPCS | Performed by: INTERNAL MEDICINE

## 2017-12-15 PROCEDURE — 80048 BASIC METABOLIC PNL TOTAL CA: CPT | Performed by: HOSPITALIST

## 2017-12-15 PROCEDURE — 84100 ASSAY OF PHOSPHORUS: CPT | Performed by: HOSPITALIST

## 2017-12-15 PROCEDURE — 74011000250 HC RX REV CODE- 250: Performed by: INTERNAL MEDICINE

## 2017-12-15 PROCEDURE — 74011636637 HC RX REV CODE- 636/637: Performed by: HOSPITALIST

## 2017-12-15 PROCEDURE — 74011250636 HC RX REV CODE- 250/636: Performed by: HOSPITALIST

## 2017-12-15 PROCEDURE — 87103 BLOOD FUNGUS CULTURE: CPT | Performed by: INTERNAL MEDICINE

## 2017-12-15 PROCEDURE — 65660000000 HC RM CCU STEPDOWN

## 2017-12-15 PROCEDURE — 85025 COMPLETE CBC W/AUTO DIFF WBC: CPT | Performed by: INTERNAL MEDICINE

## 2017-12-15 PROCEDURE — 94640 AIRWAY INHALATION TREATMENT: CPT

## 2017-12-15 PROCEDURE — 76705 ECHO EXAM OF ABDOMEN: CPT

## 2017-12-15 RX ADMIN — HYDROXYCHLOROQUINE SULFATE 200 MG: 200 TABLET, FILM COATED ORAL at 17:37

## 2017-12-15 RX ADMIN — HYDROMORPHONE HYDROCHLORIDE 1 MG: 2 INJECTION INTRAMUSCULAR; INTRAVENOUS; SUBCUTANEOUS at 22:08

## 2017-12-15 RX ADMIN — HYDROMORPHONE HYDROCHLORIDE 1 MG: 2 INJECTION INTRAMUSCULAR; INTRAVENOUS; SUBCUTANEOUS at 01:19

## 2017-12-15 RX ADMIN — GEMFIBROZIL 300 MG: 600 TABLET ORAL at 08:51

## 2017-12-15 RX ADMIN — ALLOPURINOL 100 MG: 100 TABLET ORAL at 08:51

## 2017-12-15 RX ADMIN — PREDNISONE 5 MG: 5 TABLET ORAL at 08:51

## 2017-12-15 RX ADMIN — AZATHIOPRINE 50 MG: 50 TABLET ORAL at 09:00

## 2017-12-15 RX ADMIN — HYDROXYCHLOROQUINE SULFATE 200 MG: 200 TABLET, FILM COATED ORAL at 08:51

## 2017-12-15 RX ADMIN — HYDROMORPHONE HYDROCHLORIDE 1 MG: 2 INJECTION INTRAMUSCULAR; INTRAVENOUS; SUBCUTANEOUS at 09:00

## 2017-12-15 RX ADMIN — ARFORMOTEROL TARTRATE 15 MCG: 15 SOLUTION RESPIRATORY (INHALATION) at 19:49

## 2017-12-15 RX ADMIN — CARVEDILOL 6.25 MG: 6.25 TABLET, FILM COATED ORAL at 17:37

## 2017-12-15 RX ADMIN — SODIUM CHLORIDE, SODIUM LACTATE, CALCIUM CHLORIDE, MAGNESIUM CHLORIDE AND DEXTROSE 2500 ML: 1.5; 538; 448; 18.3; 5.08 INJECTION, SOLUTION INTRAPERITONEAL at 11:55

## 2017-12-15 RX ADMIN — FLUCONAZOLE 200 MG: 2 INJECTION INTRAVENOUS at 13:10

## 2017-12-15 RX ADMIN — PANTOPRAZOLE SODIUM 40 MG: 40 TABLET, DELAYED RELEASE ORAL at 08:12

## 2017-12-15 RX ADMIN — SODIUM CHLORIDE, SODIUM LACTATE, CALCIUM CHLORIDE, MAGNESIUM CHLORIDE AND DEXTROSE 2500 ML: 1.5; 538; 448; 18.3; 5.08 INJECTION, SOLUTION INTRAPERITONEAL at 08:13

## 2017-12-15 RX ADMIN — Medication 1 CAPSULE: at 08:51

## 2017-12-15 RX ADMIN — APIXABAN 5 MG: 5 TABLET, FILM COATED ORAL at 17:36

## 2017-12-15 RX ADMIN — BUDESONIDE 500 MCG: 0.5 INHALANT RESPIRATORY (INHALATION) at 19:49

## 2017-12-15 RX ADMIN — HYDROMORPHONE HYDROCHLORIDE 1 MG: 2 INJECTION INTRAMUSCULAR; INTRAVENOUS; SUBCUTANEOUS at 13:10

## 2017-12-15 RX ADMIN — SODIUM CHLORIDE, SODIUM LACTATE, CALCIUM CHLORIDE, MAGNESIUM CHLORIDE AND DEXTROSE 2500 ML: 1.5; 538; 448; 18.3; 5.08 INJECTION, SOLUTION INTRAPERITONEAL at 22:07

## 2017-12-15 RX ADMIN — CARVEDILOL 6.25 MG: 6.25 TABLET, FILM COATED ORAL at 08:51

## 2017-12-15 RX ADMIN — CINACALCET HYDROCHLORIDE 30 MG: 30 TABLET, COATED ORAL at 17:37

## 2017-12-15 RX ADMIN — SODIUM CHLORIDE, SODIUM LACTATE, CALCIUM CHLORIDE, MAGNESIUM CHLORIDE AND DEXTROSE 2500 ML: 1.5; 538; 448; 18.3; 5.08 INJECTION, SOLUTION INTRAPERITONEAL at 15:28

## 2017-12-15 RX ADMIN — BUDESONIDE 500 MCG: 0.5 INHALANT RESPIRATORY (INHALATION) at 07:40

## 2017-12-15 RX ADMIN — ARFORMOTEROL TARTRATE 15 MCG: 15 SOLUTION RESPIRATORY (INHALATION) at 07:40

## 2017-12-15 RX ADMIN — APIXABAN 5 MG: 5 TABLET, FILM COATED ORAL at 08:51

## 2017-12-15 NOTE — PROGRESS NOTES
Hospitalist Progress Note      Hospital summary: 33 y.o lady with HTN, SLE w/ lupus nephritis, ESRD on PD, dyslipidemia, VTE, who presented with abdominal pain, initial work-up was consistent with peritonitis. She was recently discharged from here for anasarca due to hypoalbuminemia. Assessment/Plan:  Peritonitis with associated sepsis: (POA) Peritoneal fluid cell count showed WBC of 3769 with 90% neutrophils, gram stain with budding yeast. Tachycardia and leukocytosis on admission. Likely due to infected PD catheter.  -continue IV vancomycin, cefepime  -continue IV fluconazole; last admission peritoneal fluid cultures from 11/27/17 grew light candida parapsilosis  -f/u blood cultures  -ID, nephrology, vascular surgery consulted. Plan for PD cath removal on Mon 12/18 and transition to HD    Abdominal pain: (POA) likely due to peritonitis. Improving. CT of the abd showed hepatic steatosis and possible thickening of the right colon.  -states RUQ pain is returning today. Will obtain an US of the RUQ    Hypomagnesemia:   -resolved; monitor    SLE:  -continue azathioprine, Plaquenil, low-dose prednisone    ESRD: on PD  -nephrology consulted  -continue cinacalcet    Mild hyponatremia    Severe protein-calorie malnutrition: (POA) based on muscle wasting, loss of subq fat, reduced nutritional intake  -nutrition consulted    HTN:  -continue lower dose carvedilol  -holding amlodipine, losartan    Hyperlipidemia:  -continue gemfibrozil and     History of VTE:  -continue apixaban    Code status: full  DVT prophylaxis: on apixaban  Disposition: likely home when medically appropriate  ----------------------------------------------    CC: abdominal pain    S: states RUQ abd pain is returning, dull, non-radiating, no associated w/ PO intake, no n/v/d, no dyspnea. Review of Systems:  Pertinent items are noted in HPI.     O:  Visit Vitals    /77 (BP 1 Location: Right arm, BP Patient Position: At rest)    Pulse 90    Temp 96.9 °F (36.1 °C)    Resp 18    Ht 5' 5\" (1.651 m)    Wt 61.3 kg (135 lb 1.6 oz)    SpO2 97%    BMI 22.48 kg/m2       PHYSICAL EXAM:  Gen: NAD, chronically-ill appearing  HEENT: anicteric sclerae, normal conjunctiva, oropharynx clear, MM moist  Neck: supple, trachea midline, no adenopathy  Heart: RR, normal rate, no MRG, no JVD, no peripheral edema  Lungs: CTA b/l, non-labored respirations  Abd: soft, moderate tenderness in RUQ, PD cath, ND, BS+  Extr: warm  Skin: dry  Neuro: CN II-XII grossly intact, normal speech, moves all extremities  Psych: normal mood, appropriate affect      Intake/Output Summary (Last 24 hours) at 12/15/17 0853  Last data filed at 12/15/17 0123   Gross per 24 hour   Intake                0 ml   Output            20900 ml   Net           -03155 ml        Recent labs & imaging reviewed:  Recent Labs      12/15/17   0539  12/14/17   0943   WBC  17.3*  12.3*   HGB  8.0*  7.0*   HCT  24.9*  21.0*   PLT  418*  366     Recent Labs      12/15/17   0539  12/14/17   0943  12/14/17   0940  12/13/17 0438   NA  132*  138   --   132*   K  3.7  2.9*   --   3.5   CL  100  105   --   96*   CO2  24  22   --   26   BUN  37*  38*   --   36*   CREA  6.73*  6.83*   --   7.96*   GLU  84  71   --   83   CA  7.1*  5.9*   --   7.9*   MG  2.0  1.2*   --   1.4*   PHOS  2.3*   --   2.8  3.7     Recent Labs      12/13/17   0438  12/12/17 2048 12/12/17 2045   SGOT  7*  9*  10*   ALT  <6*  7*  7*   AP  89  95  99   TBILI  0.6  0.6  0.6   TP  6.1*  6.6  6.6   ALB  0.9*  1.0*  1.0*   GLOB  5.2*  5.6*  5.6*   LPSE   --   78   --        Lab Results   Component Value Date/Time    Folate 3.9 11/06/2017 11:49 AM        Lab Results   Component Value Date/Time    Cholesterol, total 117 09/25/2015 02:02 PM    HDL Cholesterol 31 09/25/2015 02:02 PM    LDL, calculated 57 09/25/2015 02:02 PM    Triglyceride 146 09/25/2015 02:02 PM    CHOL/HDL Ratio 7.7 05/31/2009 10:30 AM     Lab Results   Component Value Date/Time    Glucose (POC) 95 09/16/2015 12:08 PM    Glucose (POC) 116 09/03/2013 06:20 AM    Glucose (POC) 151 09/02/2013 09:07 PM    Glucose (POC) 162 09/02/2013 04:57 PM    Glucose (POC) 133 09/02/2013 11:58 AM    Glucose (POC) 203 09/01/2013 11:02 PM     Lab Results   Component Value Date/Time    Color YELLOW/STRAW 08/12/2016 09:06 PM    Appearance CLEAR 08/12/2016 09:06 PM    Specific gravity 1.017 08/12/2016 09:06 PM    Specific gravity 1.010 07/11/2016 12:44 PM    pH (UA) 7.0 08/12/2016 09:06 PM    Protein 300 08/12/2016 09:06 PM    Glucose NEGATIVE  08/12/2016 09:06 PM    Ketone TRACE 08/12/2016 09:06 PM    Bilirubin NEGATIVE  07/11/2016 12:44 PM    Urobilinogen 1.0 08/12/2016 09:06 PM    Nitrites NEGATIVE  08/12/2016 09:06 PM    Leukocyte Esterase NEGATIVE  08/12/2016 09:06 PM    Epithelial cells MODERATE 08/12/2016 09:06 PM    Bacteria 1+ 08/12/2016 09:06 PM    WBC 0-4 08/12/2016 09:06 PM    RBC 0-5 08/12/2016 09:06 PM       Med list reviewed  Current Facility-Administered Medications   Medication Dose Route Frequency    carvedilol (COREG) tablet 6.25 mg  6.25 mg Oral BID WITH MEALS    allopurinol (ZYLOPRIM) tablet 100 mg  100 mg Oral DAILY    apixaban (ELIQUIS) tablet 5 mg  5 mg Oral BID    azaTHIOprine (IMURAN) tablet 50 mg  50 mg Oral DAILY AFTER BREAKFAST    hydroxychloroquine (PLAQUENIL) tablet 200 mg  200 mg Oral BID    oxyCODONE IR (ROXICODONE) tablet 5 mg  5 mg Oral Q6H PRN    pantoprazole (PROTONIX) tablet 40 mg  40 mg Oral ACB    senna (SENOKOT) tablet 8.6 mg  1 Tab Oral QHS PRN    HYDROmorphone (DILAUDID) injection 1 mg  1 mg IntraVENous Q4H PRN    ondansetron (ZOFRAN) injection 4 mg  4 mg IntraVENous Q4H PRN    lactobac ac& pc-s.therm-b.anim (AJIT Q/RISAQUAD)  1 Cap Oral DAILY    albuterol (PROVENTIL VENTOLIN) nebulizer solution 2.5 mg  2.5 mg Nebulization Q4H PRN    arformoterol (BROVANA) neb solution 15 mcg  15 mcg Nebulization BID RT    And    budesonide (PULMICORT) 500 mcg/2 ml nebulizer suspension  500 mcg Nebulization BID RT    gemfibrozil (LOPID) tablet 300 mg  300 mg Oral DAILY    predniSONE (DELTASONE) tablet 5 mg  5 mg Oral DAILY WITH BREAKFAST    cinacalcet (SENSIPAR) tablet 30 mg  30 mg Oral DAILY WITH DINNER    fluconazole (DIFLUCAN) 200mg/100 mL IVPB (premix)  200 mg IntraVENous Q24H    peritoneal dialysis DEXTROSE 1.5% (2.5 mEq/L low calcium) solution 2,500 mL  2,500 mL IntraPERitoneal 5XD    acetaminophen (TYLENOL) tablet 650 mg  650 mg Oral Q4H PRN       Care Plan discussed with:  Patient/Family    Vikash Strong MD  Internal Medicine  Date of Service: 12/15/2017

## 2017-12-15 NOTE — PROGRESS NOTES
Vascular Surgery  --on for permacath removal and tunneled HD catheter placement in OR on Monday  --will stop Eliquis over weekend

## 2017-12-15 NOTE — PROGRESS NOTES
NUTRITION         Pt seen for po check, supplement acceptance. Reviewed chart, spoke with pt. Pt states eating very little bfast (\"not a bfast person\") but states eating % lunch and dinner and drinking 100% Nepro. Pt indicated eating yogurt (hs snack) but noted yogurt from last night still at bedside. No po documentation in flow sheets. RD to follow po intake meals/supplements, wt status, renal labs.      Pavithra Pop RD

## 2017-12-15 NOTE — PROGRESS NOTES
Bedside shift change report given to [facility] [facility] (oncoming nurse) by [id number] (offgoing nurse). Report included the following information SBAR, Kardex, MAR, Recent Results and Cardiac Rhythm NSR.

## 2017-12-15 NOTE — PROGRESS NOTES
Kris Hui  is a 33 y. o.female  with a history of SLE, lupus nephritis leading to ESRD /peritoneal dialysis/HTN  Admitted with abdominal pain and inabiltiy to eat  Multiple hospitalizations in the past few months  OCt 14-17 /2017 for left lower lobe pneumonia/effusion- was found to have PE on the rt side  10/27-10/30 possible pneumonia- same infiltrate left lower lobe- sent on augmentin     11/26-/11/28 for b/l leg swelling and abdominal swelling  She had ascitic fluid sent for culture during that admission  But she was discharged before the result came back- and it has candida albicans  SHe is now admitted with abdominal pain, poor appetite  Pt says the pain is more on the rt side, sharp can come with movement- no radiation- lasts a few minutes  She has no appetite- can become nauseous if she eats. She has lost 60 pounds she says since the past few months  She rarely makes urine  No diarrhea  Has a baseline cough of few months- does not bring up any sputum. She has been on peritoneal dialysis since 2015- had cath placed in 2015  LMP 2 yrs ago  Was sexually active in august 2017  HIV [address] 72 negative in 2015  Last travel was to [place] in July 2017      Subjective  Ate lunch today  Objective:   VITALS:    Visit Vitals    BP 92/63 (BP 1 Location: Right arm, BP Patient Position: At rest)    Pulse 74    Temp 98.3 °F (36.8 °C)    Resp 18    Ht 5' 5\" (1.651 m)    Wt 135 lb 1.6 oz (61.3 kg)    SpO2 96%    BMI 22.48 kg/m2      PHYSICAL EXAM:   General:                    Alert, cooperative, no distress, appears stated age. Chronically ill     Lungs:                       B/l air entry  crepts bases     Heart:                                  s1s2  Abdomen:                  Soft- PD catheter  Tenderness over rt side  Extremities:               Extremities normal, atraumatic, no cyanosis. No edema.  No clubbing  Skin:                                    Dry skin- striae over arms/abdomen  Lymph: Cervical, supraclavicular normal.  Neurologic:                Grossly non-focal     Pertinent Labs  Wbc 19>15.9>12.3  Hb 9.5    Lactate 1  Albumin 0.9  Peritoneal fluid- 3769 WBC ( 90% N)  12/13  Peritoneal fluid-yeast   11/27- candida parapsilosis  IMAGING RESULTS:        Impression/Recommendation  33 yr female with SLE/ lupus nephritis/HTN on peritoneal dialysis  Admitted with abdominal pain     Fungal peritonitis due to candida in a patient with catheter for peritoneal dialysis- on fluconazole  The catheter will have to be removed  Blood culture pending  Also on vanco and cefepime as per nephrology- will continue till cultures finalilze     Weight loss- ? Due to ESRD and need for hemodialysis  ? Due to lupus  She is at risk for infections like TB secondary to her status and immunosuppressed state.  No contact with TB  Will check HIV and hepatitis panel     SLE-on imuran/plaquenil and prednisone     HTN-on losartan     PE- on elaquis     Hypoalbuminemia - from PD and poor nutrition   Chronic cough- no clinical evidence of PCP- will get sputum for culture    Discussed with patient  Spoke to  nephrologist- Elli Barba will be placed on Monday followed by PD catheter removal .  Will follow her peripherally this weekend- call if needed

## 2017-12-15 NOTE — PROGRESS NOTES
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 Repeat C3 is mild low; C4 is nl  PE- during recent previous admit      Plan/Recommendations:  Ct CAPD in house; use 1.5% dianneal  5 exchnages per day  S/p IP Vanc + Cefepime on ; cultures negative so far except candida  Ct antifungal  Eliquis to be held on , followed by PD catheter removal  Will ask Dr. Bhavna Thomas to [facility]  If not will consult IR for permacath on Tuesday, followed by HD  She wants to f/u with us at [facility] unit, but might not be practical as it is far for her and encourage her to choose closer unit  There is [facility] unit; will need to transfer care to [facility] at that point, if she ends up going there  Off Losartan/norvasc and Coreg reduced d/t hypotension  Off IVF    Subjective:  Feels  Better. Some abd pain last night on R side  PD fluid clear    ROS:   No nausea, no vomiting  No chest pain, no shortness of breath    Exam:  Visit Vitals    /77 (BP 1 Location: Right arm, BP Patient Position: At rest)    Pulse 90    Temp 96.9 °F (36.1 °C)    Resp 18    Ht 5' 5\" (1.651 m)    Wt 61.3 kg (135 lb 1.6 oz)    SpO2 97%    BMI 22.48 kg/m2       Gen: NAD  HEENT: No icterus, mmm,  AT/NC  Lungs/Chest wall: Clear. Equal BS. No respiratory distress  Cardiovascular: Regular rate, normal rhythm. Abdomen/: Soft, NT,   BS+  Ext: No peripheral edema  CNS: alert and awake.      Current Facility-Administered Medications   Medication Dose Route Frequency Last Dose    carvedilol (COREG) tablet 6.25 mg  6.25 mg Oral BID WITH MEALS 6.25 mg at 12/15/17 0851    allopurinol (ZYLOPRIM) tablet 100 mg  100 mg Oral DAILY 100 mg at 12/15/17 0851    apixaban (ELIQUIS) tablet 5 mg  5 mg Oral BID 5 mg at 12/15/17 0851    azaTHIOprine (IMURAN) tablet 50 mg  50 mg Oral DAILY AFTER BREAKFAST 50 mg at 12/15/17 0900    hydroxychloroquine (PLAQUENIL) tablet 200 mg  200 mg Oral  mg at 12/15/17 0851    oxyCODONE IR (ROXICODONE) tablet 5 mg  5 mg Oral Q6H PRN 5 mg at 12/14/17 1928    pantoprazole (PROTONIX) tablet 40 mg  40 mg Oral ACB 40 mg at 12/15/17 0242    senna (SENOKOT) tablet 8.6 mg  1 Tab Oral QHS PRN      HYDROmorphone (DILAUDID) injection 1 mg  1 mg IntraVENous Q4H PRN 1 mg at 12/15/17 0900    ondansetron (ZOFRAN) injection 4 mg  4 mg IntraVENous Q4H PRN 4 mg at 12/13/17 0858    [facility] ac& pc-s.therm-b.anim (AJIT Q/RISAQUAD)  1 Cap Oral DAILY 1 Cap at 12/15/17 0851    albuterol (PROVENTIL VENTOLIN) nebulizer solution 2.5 mg  2.5 mg Nebulization Q4H PRN      arformoterol (BROVANA) neb solution 15 mcg  15 mcg Nebulization BID RT 15 mcg at 12/15/17 0740    And    budesonide (PULMICORT) 500 mcg/2 ml nebulizer suspension  500 mcg Nebulization BID  mcg at 12/15/17 0740    gemfibrozil (LOPID) tablet 300 mg  300 mg Oral DAILY 300 mg at 12/15/17 0851    predniSONE (DELTASONE) tablet 5 mg  5 mg Oral DAILY WITH BREAKFAST 5 mg at 12/15/17 0851    cinacalcet (SENSIPAR) tablet 30 mg  30 mg Oral DAILY WITH DINNER 30 mg at 12/14/17 1821    fluconazole (DIFLUCAN) 200mg/100 mL IVPB (premix)  200 mg IntraVENous Q24H 200 mg at 12/14/17 1400    peritoneal dialysis DEXTROSE 1.5% (2.5 mEq/L low calcium) solution 2,500 mL  2,500 mL IntraPERitoneal 5XD 2,500 mL at 12/15/17 0813    acetaminophen (TYLENOL) tablet 650 mg  650 mg Oral Q4H PRN         Labs/Data:    Lab Results   Component Value Date/Time    WBC 17.3 12/15/2017 05:39 AM    Hemoglobin (POC) 11.2 09/16/2015 12:08 PM    HGB 8.0 12/15/2017 05:39 AM    Hematocrit (POC) 33 09/16/2015 12:08 PM    HCT 24.9 12/15/2017 05:39 AM    PLATELET 765 07/45/3197 05:39 AM    MCV 84.1 12/15/2017 05:39 AM       Lab Results   Component Value Date/Time    Sodium 132 12/15/2017 05:39 AM    Potassium 3.7 12/15/2017 05:39 AM    Chloride 100 12/15/2017 05:39 AM    CO2 24 12/15/2017 05:39 AM    Anion gap 8 12/15/2017 05:39 AM    Glucose 84 12/15/2017 05:39 AM    BUN 37 12/15/2017 05:39 AM    Creatinine 6.73 12/15/2017 05:39 AM    BUN/Creatinine ratio 5 12/15/2017 05:39 AM    GFR est AA 9 12/15/2017 05:39 AM    GFR est non-AA 7 12/15/2017 05:39 AM    Calcium 7.1 12/15/2017 05:39 AM       Wt Readings from Last 3 Encounters:   12/15/17 61.3 kg (135 lb 1.6 oz)   12/08/17 65.1 kg (143 lb 9.6 oz)   12/08/17 64.9 kg (143 lb)         Intake/Output Summary (Last 24 hours) at 12/15/17 1011  Last data filed at 12/15/17 0123   Gross per 24 hour   Intake                0 ml   Output            12104 ml   Net           -79407 ml       Patient seen and examined. Chart reviewed. Labs, data and other pertinent notes reviewed in last 24 hrs.     PMH/SH/FH reviewed and unchanged compared to H&P    Discussed with pt, RN      Esau Mart MD

## 2017-12-15 NOTE — PROGRESS NOTES
Pt tolerating PD dialysis well. Full bags infused and dwell bags full with return. Continues to complain of abdominal pain that is covered well with one mg dilaudid x2 this shift. Family at bedside. Pt and family aware of plan to remove PD cath and place hemodialysis cath on Monday.

## 2017-12-15 NOTE — PROGRESS NOTES
Spoke with ID earlier  Will ct antifungal    Arrange for removal of PD catheter.  D/w Dr. Kajal Cagle  Pt is on Eliquis and need to make sure she does not turn bacteremic    She will need to be switched to HD    So will plan removal of PD catheter on Monday and Permacath, followed by HD  Will ct CAPD, ABx in meantime    Ephraim Yang MD  [id number] [facility]

## 2017-12-16 LAB
ANION GAP SERPL CALC-SCNC: 10 MMOL/L (ref 5–15)
BACTERIA SPEC CULT: ABNORMAL
BACTERIA SPEC CULT: ABNORMAL
BASOPHILS # BLD: 0 K/UL (ref 0–0.1)
BASOPHILS NFR BLD: 0 % (ref 0–1)
BUN SERPL-MCNC: 35 MG/DL (ref 6–20)
BUN/CREAT SERPL: 5 (ref 12–20)
CALCIUM SERPL-MCNC: 6.7 MG/DL (ref 8.5–10.1)
CHLORIDE SERPL-SCNC: 98 MMOL/L (ref 97–108)
CO2 SERPL-SCNC: 26 MMOL/L (ref 21–32)
CREAT SERPL-MCNC: 6.41 MG/DL (ref 0.55–1.02)
EOSINOPHIL # BLD: 0.1 K/UL (ref 0–0.4)
EOSINOPHIL NFR BLD: 1 % (ref 0–7)
ERYTHROCYTE [DISTWIDTH] IN BLOOD BY AUTOMATED COUNT: 18 % (ref 11.5–14.5)
GLUCOSE SERPL-MCNC: 84 MG/DL (ref 65–100)
GRAM STN SPEC: ABNORMAL
HCT VFR BLD AUTO: 23.9 % (ref 35–47)
HGB BLD-MCNC: 8 G/DL (ref 11.5–16)
LYMPHOCYTES # BLD: 1.2 K/UL (ref 0.8–3.5)
LYMPHOCYTES NFR BLD: 8 % (ref 12–49)
MAGNESIUM SERPL-MCNC: 1.7 MG/DL (ref 1.6–2.4)
MCH RBC QN AUTO: 28 PG (ref 26–34)
MCHC RBC AUTO-ENTMCNC: 33.5 G/DL (ref 30–36.5)
MCV RBC AUTO: 83.6 FL (ref 80–99)
MONOCYTES # BLD: 0.6 K/UL (ref 0–1)
MONOCYTES NFR BLD: 4 % (ref 5–13)
NEUTS SEG # BLD: 12.7 K/UL (ref 1.8–8)
NEUTS SEG NFR BLD: 87 % (ref 32–75)
PHOSPHATE SERPL-MCNC: 2.4 MG/DL (ref 2.6–4.7)
PLATELET # BLD AUTO: 432 K/UL (ref 150–400)
POTASSIUM SERPL-SCNC: 3.2 MMOL/L (ref 3.5–5.1)
RBC # BLD AUTO: 2.86 M/UL (ref 3.8–5.2)
SERVICE CMNT-IMP: ABNORMAL
SODIUM SERPL-SCNC: 134 MMOL/L (ref 136–145)
WBC # BLD AUTO: 14.6 K/UL (ref 3.6–11)

## 2017-12-16 PROCEDURE — 74011000258 HC RX REV CODE- 258: Performed by: HOSPITALIST

## 2017-12-16 PROCEDURE — A4722 DIALYS SOL FLD VOL > 1999CC: HCPCS | Performed by: INTERNAL MEDICINE

## 2017-12-16 PROCEDURE — 74011000250 HC RX REV CODE- 250: Performed by: INTERNAL MEDICINE

## 2017-12-16 PROCEDURE — 74011250636 HC RX REV CODE- 250/636: Performed by: INTERNAL MEDICINE

## 2017-12-16 PROCEDURE — 83735 ASSAY OF MAGNESIUM: CPT | Performed by: HOSPITALIST

## 2017-12-16 PROCEDURE — 84100 ASSAY OF PHOSPHORUS: CPT | Performed by: HOSPITALIST

## 2017-12-16 PROCEDURE — 85025 COMPLETE CBC W/AUTO DIFF WBC: CPT | Performed by: INTERNAL MEDICINE

## 2017-12-16 PROCEDURE — 74011250637 HC RX REV CODE- 250/637: Performed by: INTERNAL MEDICINE

## 2017-12-16 PROCEDURE — 65660000000 HC RM CCU STEPDOWN

## 2017-12-16 PROCEDURE — 80048 BASIC METABOLIC PNL TOTAL CA: CPT | Performed by: HOSPITALIST

## 2017-12-16 PROCEDURE — 36415 COLL VENOUS BLD VENIPUNCTURE: CPT | Performed by: INTERNAL MEDICINE

## 2017-12-16 PROCEDURE — 74011636637 HC RX REV CODE- 636/637: Performed by: HOSPITALIST

## 2017-12-16 PROCEDURE — 94640 AIRWAY INHALATION TREATMENT: CPT

## 2017-12-16 PROCEDURE — 74011250637 HC RX REV CODE- 250/637: Performed by: HOSPITALIST

## 2017-12-16 PROCEDURE — 74011250636 HC RX REV CODE- 250/636: Performed by: HOSPITALIST

## 2017-12-16 RX ORDER — POTASSIUM CHLORIDE 750 MG/1
40 TABLET, FILM COATED, EXTENDED RELEASE ORAL
Status: COMPLETED | OUTPATIENT
Start: 2017-12-16 | End: 2017-12-16

## 2017-12-16 RX ADMIN — GEMFIBROZIL 300 MG: 600 TABLET ORAL at 09:27

## 2017-12-16 RX ADMIN — ONDANSETRON 4 MG: 2 INJECTION INTRAMUSCULAR; INTRAVENOUS at 17:21

## 2017-12-16 RX ADMIN — AZATHIOPRINE 50 MG: 50 TABLET ORAL at 09:37

## 2017-12-16 RX ADMIN — CINACALCET HYDROCHLORIDE 30 MG: 30 TABLET, COATED ORAL at 17:21

## 2017-12-16 RX ADMIN — ARFORMOTEROL TARTRATE 15 MCG: 15 SOLUTION RESPIRATORY (INHALATION) at 07:18

## 2017-12-16 RX ADMIN — HYDROXYCHLOROQUINE SULFATE 200 MG: 200 TABLET, FILM COATED ORAL at 17:21

## 2017-12-16 RX ADMIN — PREDNISONE 5 MG: 5 TABLET ORAL at 09:27

## 2017-12-16 RX ADMIN — BUDESONIDE 500 MCG: 0.5 INHALANT RESPIRATORY (INHALATION) at 07:18

## 2017-12-16 RX ADMIN — Medication 1 CAPSULE: at 09:27

## 2017-12-16 RX ADMIN — PANTOPRAZOLE SODIUM 40 MG: 40 TABLET, DELAYED RELEASE ORAL at 07:46

## 2017-12-16 RX ADMIN — ALLOPURINOL 100 MG: 100 TABLET ORAL at 09:26

## 2017-12-16 RX ADMIN — HYDROMORPHONE HYDROCHLORIDE 1 MG: 2 INJECTION INTRAMUSCULAR; INTRAVENOUS; SUBCUTANEOUS at 23:49

## 2017-12-16 RX ADMIN — BUDESONIDE 500 MCG: 0.5 INHALANT RESPIRATORY (INHALATION) at 20:29

## 2017-12-16 RX ADMIN — SODIUM CHLORIDE, SODIUM LACTATE, CALCIUM CHLORIDE, MAGNESIUM CHLORIDE AND DEXTROSE 2500 ML: 1.5; 538; 448; 18.3; 5.08 INJECTION, SOLUTION INTRAPERITONEAL at 14:34

## 2017-12-16 RX ADMIN — HYDROMORPHONE HYDROCHLORIDE 1 MG: 2 INJECTION INTRAMUSCULAR; INTRAVENOUS; SUBCUTANEOUS at 07:46

## 2017-12-16 RX ADMIN — SODIUM CHLORIDE, SODIUM LACTATE, CALCIUM CHLORIDE, MAGNESIUM CHLORIDE AND DEXTROSE 2500 ML: 1.5; 538; 448; 18.3; 5.08 INJECTION, SOLUTION INTRAPERITONEAL at 18:57

## 2017-12-16 RX ADMIN — SODIUM CHLORIDE, SODIUM LACTATE, CALCIUM CHLORIDE, MAGNESIUM CHLORIDE AND DEXTROSE 2500 ML: 1.5; 538; 448; 18.3; 5.08 INJECTION, SOLUTION INTRAPERITONEAL at 11:00

## 2017-12-16 RX ADMIN — ARFORMOTEROL TARTRATE 15 MCG: 15 SOLUTION RESPIRATORY (INHALATION) at 20:29

## 2017-12-16 RX ADMIN — POTASSIUM CHLORIDE 40 MEQ: 750 TABLET, FILM COATED, EXTENDED RELEASE ORAL at 10:09

## 2017-12-16 RX ADMIN — HYDROXYCHLOROQUINE SULFATE 200 MG: 200 TABLET, FILM COATED ORAL at 09:26

## 2017-12-16 RX ADMIN — HYDROMORPHONE HYDROCHLORIDE 1 MG: 2 INJECTION INTRAMUSCULAR; INTRAVENOUS; SUBCUTANEOUS at 03:43

## 2017-12-16 RX ADMIN — CARVEDILOL 6.25 MG: 6.25 TABLET, FILM COATED ORAL at 17:21

## 2017-12-16 RX ADMIN — SODIUM CHLORIDE, SODIUM LACTATE, CALCIUM CHLORIDE, MAGNESIUM CHLORIDE AND DEXTROSE 2500 ML: 1.5; 538; 448; 18.3; 5.08 INJECTION, SOLUTION INTRAPERITONEAL at 23:13

## 2017-12-16 RX ADMIN — CALCIUM GLUCONATE 1 G: 94 INJECTION, SOLUTION INTRAVENOUS at 10:09

## 2017-12-16 RX ADMIN — CARVEDILOL 6.25 MG: 6.25 TABLET, FILM COATED ORAL at 09:26

## 2017-12-16 RX ADMIN — HYDROMORPHONE HYDROCHLORIDE 1 MG: 2 INJECTION INTRAMUSCULAR; INTRAVENOUS; SUBCUTANEOUS at 19:43

## 2017-12-16 RX ADMIN — SODIUM CHLORIDE, SODIUM LACTATE, CALCIUM CHLORIDE, MAGNESIUM CHLORIDE AND DEXTROSE 2500 ML: 1.5; 538; 448; 18.3; 5.08 INJECTION, SOLUTION INTRAPERITONEAL at 07:56

## 2017-12-16 RX ADMIN — FLUCONAZOLE 200 MG: 2 INJECTION INTRAVENOUS at 14:32

## 2017-12-16 RX ADMIN — HYDROMORPHONE HYDROCHLORIDE 1 MG: 2 INJECTION INTRAMUSCULAR; INTRAVENOUS; SUBCUTANEOUS at 14:29

## 2017-12-16 NOTE — PROGRESS NOTES
2000 - Bedside and Verbal shift change report given to Dario Fox (oncoming nurse) by Darylene Drones (offgoing nurse). Report included the following information SBAR, Kardex, ED Summary, OR Summary, Procedure Summary, Intake/Output, MAR, Accordion, Recent Results, Med Rec Status and Cardiac Rhythm NSR. Problem: Falls - Risk of  Goal: *Absence of Falls  Document Alex Fall Risk and appropriate interventions in the flowsheet. Outcome: Progressing Towards Goal  Fall Risk Interventions:  Mobility Interventions: Communicate number of staff needed for ambulation/transfer, Patient to call before getting OOB, PT Consult for mobility concerns           Medication Interventions: Assess postural VS orthostatic hypotension, Evaluate medications/consider consulting pharmacy, Patient to call before getting OOB, Teach patient to arise slowly     Elimination Interventions: Call light in reach, Patient to call for help with toileting needs      Pt has had no falls during admission. Call bell and belongings in reach. Pt to call before getting OOB.  Will continue to monitor.

## 2017-12-16 NOTE — PROGRESS NOTES
Problem: Pain  Goal: *Control of Pain  Outcome: Not Progressing Towards Goal  Patient rates abdominal pain 8/10 and asks for PRN pain medications as soon as she can have them. Trying other measures to relieve pain, such as repositioning and dimming the lights. Bedside shift change report given to [facility] (oncoming nurse) by Romayne Lust (offgoing nurse). Report included the following information SBAR, Kardex, Intake/Output, MAR, Recent Results and Cardiac Rhythm NSR.

## 2017-12-16 NOTE — PROGRESS NOTES
Hospitalist Progress Note      Hospital summary: 33 y.o lady with HTN, SLE w/ lupus nephritis, ESRD on PD, dyslipidemia, VTE, who presented with abdominal pain, initial work-up was consistent with peritonitis. She was recently discharged from here for anasarca due to hypoalbuminemia. Assessment/Plan:  Peritonitis with associated sepsis: (POA) Peritoneal fluid cell count showed WBC of 3769 with 90% neutrophils, gram stain with budding yeast. Peritoneal fluid cultures from last admission on 11/27/17 grew light candida parapsilosis. Tachycardia and leukocytosis on admission. Likely due to infected PD catheter. Sepsis syndrome has resolved. -continue IV fluconazole, received IV vancomycin, cefepime  -f/u blood cultures  -ID, nephrology, vascular surgery consulted. Plan for PD cath removal on Mon 12/18 and transition to HD    Abdominal pain: (POA) likely due to peritonitis. Improving. CT of the abd showed hepatic steatosis and possible thickening of the right colon.  Pain was RUQ predominant, an abd US showed hepatic steatosis and gallbladder sludge    Hypomagnesemia, hypokalemia, hypophosphatemia, hypocalcemia:  -replete potassium, phos, calcium today    SLE:  -continue azathioprine, Plaquenil, low-dose prednisone    ESRD: on PD  -nephrology consulted  -continue cinacalcet    Mild hyponatremia    Severe protein-calorie malnutrition: (POA) based on muscle wasting, loss of subq fat, reduced nutritional intake  -nutrition consulted    HTN:  -continue lower dose carvedilol  -holding amlodipine, losartan    Hyperlipidemia:  -continue gemfibrozil and     History of VTE:  -will hold apixaban over the weekend per vascular surgery    Code status: full  DVT prophylaxis: on apixaban  Disposition: likely home when medically appropriate  ----------------------------------------------    CC: abdominal pain    S: states RUQ abd pain is returning, dull, non-radiating, no associated w/ PO intake, no n/v/d, no dyspnea. Review of Systems:  Pertinent items are noted in HPI. O:  Visit Vitals    /74 (BP 1 Location: Left arm, BP Patient Position: Lying right side)    Pulse (!) 101    Temp 99.4 °F (37.4 °C)    Resp 16    Ht 5' 5\" (1.651 m)    Wt 63.5 kg (140 lb)    SpO2 98%    BMI 23.3 kg/m2       PHYSICAL EXAM:  Gen: NAD, chronically-ill appearing  HEENT: anicteric sclerae, normal conjunctiva, oropharynx clear, MM moist  Neck: supple, trachea midline, no adenopathy  Heart: RR, normal rate, no MRG, no JVD, no peripheral edema  Lungs: CTA b/l, non-labored respirations  Abd: soft, mild tenderness in RUQ, PD cath, ND, BS+  Extr: warm  Skin: dry  Neuro: CN II-XII grossly intact, normal speech, moves all extremities  Psych: normal mood, appropriate affect      Intake/Output Summary (Last 24 hours) at 12/16/17 0851  Last data filed at 12/16/17 0043   Gross per 24 hour   Intake             5000 ml   Output             5200 ml   Net             -200 ml        Recent labs & imaging reviewed:  Recent Labs      12/16/17   0348  12/15/17   0539   WBC  14.6*  17.3*   HGB  8.0*  8.0*   HCT  23.9*  24.9*   PLT  432*  418*     Recent Labs      12/16/17   0354  12/15/17   0539  12/14/17   0943  12/14/17   0940   NA  134*  132*  138   --    K  3.2*  3.7  2.9*   --    CL  98  100  105   --    CO2  26  24  22   --    BUN  35*  37*  38*   --    CREA  6.41*  6.73*  6.83*   --    GLU  84  84  71   --    CA  6.7*  7.1*  5.9*   --    MG  1.7  2.0  1.2*   --    PHOS  2.4*  2.3*   --   2.8     No results for input(s): SGOT, GPT, ALT, AP, TBIL, TBILI, TP, ALB, GLOB, GGT, AML, LPSE in the last 72 hours.     No lab exists for component: AMYP, HLPSE    Lab Results   Component Value Date/Time    Folate 3.9 11/06/2017 11:49 AM        Lab Results   Component Value Date/Time    Cholesterol, total 117 09/25/2015 02:02 PM    HDL Cholesterol 31 09/25/2015 02:02 PM    LDL, calculated 57 09/25/2015 02:02 PM    Triglyceride 146 09/25/2015 02:02 PM    CHOL/HDL Ratio 7.7 05/31/2009 10:30 AM     Lab Results   Component Value Date/Time    Glucose (POC) 95 09/16/2015 12:08 PM    Glucose (POC) 116 09/03/2013 06:20 AM    Glucose (POC) 151 09/02/2013 09:07 PM    Glucose (POC) 162 09/02/2013 04:57 PM    Glucose (POC) 133 09/02/2013 11:58 AM    Glucose (POC) 203 09/01/2013 11:02 PM     Lab Results   Component Value Date/Time    Color YELLOW/STRAW 08/12/2016 09:06 PM    Appearance CLEAR 08/12/2016 09:06 PM    Specific gravity 1.017 08/12/2016 09:06 PM    Specific gravity 1.010 07/11/2016 12:44 PM    pH (UA) 7.0 08/12/2016 09:06 PM    Protein 300 08/12/2016 09:06 PM    Glucose NEGATIVE  08/12/2016 09:06 PM    Ketone TRACE 08/12/2016 09:06 PM    Bilirubin NEGATIVE  07/11/2016 12:44 PM    Urobilinogen 1.0 08/12/2016 09:06 PM    Nitrites NEGATIVE  08/12/2016 09:06 PM    Leukocyte Esterase NEGATIVE  08/12/2016 09:06 PM    Epithelial cells MODERATE 08/12/2016 09:06 PM    Bacteria 1+ 08/12/2016 09:06 PM    WBC 0-4 08/12/2016 09:06 PM    RBC 0-5 08/12/2016 09:06 PM       Med list reviewed  Current Facility-Administered Medications   Medication Dose Route Frequency    carvedilol (COREG) tablet 6.25 mg  6.25 mg Oral BID WITH MEALS    allopurinol (ZYLOPRIM) tablet 100 mg  100 mg Oral DAILY    apixaban (ELIQUIS) tablet 5 mg  5 mg Oral BID    azaTHIOprine (IMURAN) tablet 50 mg  50 mg Oral DAILY AFTER BREAKFAST    hydroxychloroquine (PLAQUENIL) tablet 200 mg  200 mg Oral BID    oxyCODONE IR (ROXICODONE) tablet 5 mg  5 mg Oral Q6H PRN    pantoprazole (PROTONIX) tablet 40 mg  40 mg Oral ACB    senna (SENOKOT) tablet 8.6 mg  1 Tab Oral QHS PRN    HYDROmorphone (DILAUDID) injection 1 mg  1 mg IntraVENous Q4H PRN    ondansetron (ZOFRAN) injection 4 mg  4 mg IntraVENous Q4H PRN    lactobac ac& pc-s.therm-b.anim (AJIT Q/RISAQUAD)  1 Cap Oral DAILY    albuterol (PROVENTIL VENTOLIN) nebulizer solution 2.5 mg  2.5 mg Nebulization Q4H PRN    arformoterol (BROVANA) neb solution 15 mcg  15 mcg Nebulization BID RT    And    budesonide (PULMICORT) 500 mcg/2 ml nebulizer suspension  500 mcg Nebulization BID RT    gemfibrozil (LOPID) tablet 300 mg  300 mg Oral DAILY    predniSONE (DELTASONE) tablet 5 mg  5 mg Oral DAILY WITH BREAKFAST    cinacalcet (SENSIPAR) tablet 30 mg  30 mg Oral DAILY WITH DINNER    fluconazole (DIFLUCAN) 200mg/100 mL IVPB (premix)  200 mg IntraVENous Q24H    peritoneal dialysis DEXTROSE 1.5% (2.5 mEq/L low calcium) solution 2,500 mL  2,500 mL IntraPERitoneal 5XD    acetaminophen (TYLENOL) tablet 650 mg  650 mg Oral Q4H PRN       Care Plan discussed with:  Patient/Family    Kinjal Campos MD  Internal Medicine  Date of Service: 12/16/2017

## 2017-12-16 NOTE — PROGRESS NOTES
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 Repeat C3 is mild low; C4 is nl  PE- during recent previous admit  Hypokalemia      Plan/Recommendations:  Ct CAPD in house; use 1.5% - 5 exchanges per day    S/p IP Vanc + Cefepime on ; cultures negative so far except candida  Ct antifungal    Eliquis to be held on , followed by PD catheter removal    Will ask Dr. Marcelle Ash to place [facility]) at same time  If not will consult IR for permacath on Tuesday, followed by HD    Ms. Thomas Davalos is deciding on where she would like to do outpatient HD (she feels very comfortable with Dr. Alberto Anderson as her kidney doctor, but lives near [facility]) - she will let us know her decision. Off Losartan/norvasc and Coreg reduced d/t hypotension    Subjective:  Feels  Better. No abd pain   PD fluid clear    ROS:   No nausea, no vomiting  No chest pain, no shortness of breath    Exam:  Visit Vitals    /78 (BP 1 Location: Right arm, BP Patient Position: At rest)    Pulse 65    Temp 98.8 °F (37.1 °C)    Resp 18    Ht 5' 5\" (1.651 m)    Wt 63.5 kg (140 lb)    SpO2 97%    BMI 23.3 kg/m2       Gen: NAD  HEENT: No icterus, mmm,  AT/NC  Lungs/Chest wall: Clear. Equal BS. No respiratory distress  Cardiovascular: Regular rate, normal rhythm. Abdomen/: Soft, NT,   BS+  Ext: No peripheral edema  CNS: alert and awake.      Current Facility-Administered Medications   Medication Dose Route Frequency Last Dose    carvedilol (COREG) tablet 6.25 mg  6.25 mg Oral BID WITH MEALS 6.25 mg at 12/15/17 9433    allopurinol (ZYLOPRIM) tablet 100 mg  100 mg Oral DAILY 100 mg at 12/15/17 0851    apixaban (ELIQUIS) tablet 5 mg  5 mg Oral BID 5 mg at 12/15/17 1736    azaTHIOprine (IMURAN) tablet 50 mg  50 mg Oral DAILY AFTER BREAKFAST 50 mg at 12/15/17 0900    hydroxychloroquine (PLAQUENIL) tablet 200 mg  200 mg Oral  mg at 12/15/17 1737    oxyCODONE IR (ROXICODONE) tablet 5 mg  5 mg Oral Q6H PRN 5 mg at 12/14/17 1928    pantoprazole (PROTONIX) tablet 40 mg  40 mg Oral ACB 40 mg at 12/16/17 0746    senna (SENOKOT) tablet 8.6 mg  1 Tab Oral QHS PRN      HYDROmorphone (DILAUDID) injection 1 mg  1 mg IntraVENous Q4H PRN 1 mg at 12/16/17 0746    ondansetron (ZOFRAN) injection 4 mg  4 mg IntraVENous Q4H PRN 4 mg at 12/13/17 0858    lactobac ac& pc-s.therm-b.anim (AJIT Q/RISAQUAD)  1 Cap Oral DAILY 1 Cap at 12/15/17 0851    albuterol (PROVENTIL VENTOLIN) nebulizer solution 2.5 mg  2.5 mg Nebulization Q4H PRN      arformoterol (BROVANA) neb solution 15 mcg  15 mcg Nebulization BID RT 15 mcg at 12/16/17 0718    And    budesonide (PULMICORT) 500 mcg/2 ml nebulizer suspension  500 mcg Nebulization BID  mcg at 12/16/17 0718    gemfibrozil (LOPID) tablet 300 mg  300 mg Oral DAILY 300 mg at 12/15/17 0851    predniSONE (DELTASONE) tablet 5 mg  5 mg Oral DAILY WITH BREAKFAST 5 mg at 12/15/17 0851    cinacalcet (SENSIPAR) tablet 30 mg  30 mg Oral DAILY WITH DINNER 30 mg at 12/15/17 1737    fluconazole (DIFLUCAN) 200mg/100 mL IVPB (premix)  200 mg IntraVENous Q24H 200 mg at 12/15/17 1310    peritoneal dialysis DEXTROSE 1.5% (2.5 mEq/L low calcium) solution 2,500 mL  2,500 mL IntraPERitoneal 5XD 2,500 mL at 12/16/17 0756    acetaminophen (TYLENOL) tablet 650 mg  650 mg Oral Q4H PRN         Labs/Data:    Lab Results   Component Value Date/Time    WBC 14.6 12/16/2017 03:48 AM    Hemoglobin (POC) 11.2 09/16/2015 12:08 PM    HGB 8.0 12/16/2017 03:48 AM    Hematocrit (POC) 33 09/16/2015 12:08 PM    HCT 23.9 12/16/2017 03:48 AM    PLATELET 432 12/16/2017 03:48 AM    MCV 83.6 12/16/2017 03:48 AM       Lab Results   Component Value Date/Time    Sodium 134 12/16/2017 03:54 AM    Potassium 3.2 12/16/2017 03:54 AM    Chloride 98 12/16/2017 03:54 AM    CO2 26 12/16/2017 03:54 AM    Anion gap 10 12/16/2017 03:54 AM    Glucose 84 12/16/2017 03:54 AM    BUN 35 12/16/2017 03:54 AM    Creatinine 6.41 12/16/2017 03:54 AM    BUN/Creatinine ratio 5 12/16/2017 03:54 AM    GFR est AA 9 12/16/2017 03:54 AM    GFR est non-AA 8 12/16/2017 03:54 AM    Calcium 6.7 12/16/2017 03:54 AM       Wt Readings from Last 3 Encounters:   12/16/17 63.5 kg (140 lb)   12/08/17 65.1 kg (143 lb 9.6 oz)   12/08/17 64.9 kg (143 lb)         Intake/Output Summary (Last 24 hours) at 12/16/17 0759  Last data filed at 12/16/17 0043   Gross per 24 hour   Intake             5000 ml   Output             5200 ml   Net             -200 ml       Patient seen and examined. Chart reviewed. Labs, data and other pertinent notes reviewed in last 24 hrs.     PMH/SH/FH reviewed and unchanged compared to H&P    Discussed with pt, RN      Aby Zavala MD

## 2017-12-17 LAB
ANION GAP SERPL CALC-SCNC: 10 MMOL/L (ref 5–15)
BACTERIA SPEC CULT: NORMAL
BUN SERPL-MCNC: 34 MG/DL (ref 6–20)
BUN/CREAT SERPL: 6 (ref 12–20)
CALCIUM SERPL-MCNC: 6.8 MG/DL (ref 8.5–10.1)
CHLORIDE SERPL-SCNC: 97 MMOL/L (ref 97–108)
CO2 SERPL-SCNC: 27 MMOL/L (ref 21–32)
CREAT SERPL-MCNC: 6.09 MG/DL (ref 0.55–1.02)
ERYTHROCYTE [DISTWIDTH] IN BLOOD BY AUTOMATED COUNT: 17.6 % (ref 11.5–14.5)
GLUCOSE SERPL-MCNC: 85 MG/DL (ref 65–100)
HCT VFR BLD AUTO: 25.6 % (ref 35–47)
HGB BLD-MCNC: 8.2 G/DL (ref 11.5–16)
MAGNESIUM SERPL-MCNC: 1.6 MG/DL (ref 1.6–2.4)
MCH RBC QN AUTO: 27.4 PG (ref 26–34)
MCHC RBC AUTO-ENTMCNC: 32 G/DL (ref 30–36.5)
MCV RBC AUTO: 85.6 FL (ref 80–99)
PHOSPHATE SERPL-MCNC: 2.5 MG/DL (ref 2.6–4.7)
PLATELET # BLD AUTO: 457 K/UL (ref 150–400)
POTASSIUM SERPL-SCNC: 3.4 MMOL/L (ref 3.5–5.1)
RBC # BLD AUTO: 2.99 M/UL (ref 3.8–5.2)
SERVICE CMNT-IMP: NORMAL
SODIUM SERPL-SCNC: 134 MMOL/L (ref 136–145)
WBC # BLD AUTO: 11 K/UL (ref 3.6–11)

## 2017-12-17 PROCEDURE — 80048 BASIC METABOLIC PNL TOTAL CA: CPT | Performed by: HOSPITALIST

## 2017-12-17 PROCEDURE — 36415 COLL VENOUS BLD VENIPUNCTURE: CPT | Performed by: HOSPITALIST

## 2017-12-17 PROCEDURE — 74011636637 HC RX REV CODE- 636/637: Performed by: HOSPITALIST

## 2017-12-17 PROCEDURE — 74011250636 HC RX REV CODE- 250/636: Performed by: INTERNAL MEDICINE

## 2017-12-17 PROCEDURE — 74011000250 HC RX REV CODE- 250: Performed by: INTERNAL MEDICINE

## 2017-12-17 PROCEDURE — 85027 COMPLETE CBC AUTOMATED: CPT | Performed by: HOSPITALIST

## 2017-12-17 PROCEDURE — A4722 DIALYS SOL FLD VOL > 1999CC: HCPCS | Performed by: INTERNAL MEDICINE

## 2017-12-17 PROCEDURE — 74011250636 HC RX REV CODE- 250/636: Performed by: HOSPITALIST

## 2017-12-17 PROCEDURE — 84100 ASSAY OF PHOSPHORUS: CPT | Performed by: HOSPITALIST

## 2017-12-17 PROCEDURE — 74011250637 HC RX REV CODE- 250/637: Performed by: INTERNAL MEDICINE

## 2017-12-17 PROCEDURE — 94640 AIRWAY INHALATION TREATMENT: CPT

## 2017-12-17 PROCEDURE — 83735 ASSAY OF MAGNESIUM: CPT | Performed by: HOSPITALIST

## 2017-12-17 PROCEDURE — 65210000002 HC RM PRIVATE GYN

## 2017-12-17 PROCEDURE — 74011250637 HC RX REV CODE- 250/637: Performed by: HOSPITALIST

## 2017-12-17 RX ORDER — LANOLIN ALCOHOL/MO/W.PET/CERES
400 CREAM (GRAM) TOPICAL
Status: COMPLETED | OUTPATIENT
Start: 2017-12-17 | End: 2017-12-17

## 2017-12-17 RX ORDER — POTASSIUM CHLORIDE 750 MG/1
20 TABLET, FILM COATED, EXTENDED RELEASE ORAL
Status: COMPLETED | OUTPATIENT
Start: 2017-12-17 | End: 2017-12-17

## 2017-12-17 RX ORDER — SODIUM CHLORIDE 0.9 % (FLUSH) 0.9 %
SYRINGE (ML) INJECTION
Status: COMPLETED
Start: 2017-12-17 | End: 2017-12-17

## 2017-12-17 RX ADMIN — BUDESONIDE 500 MCG: 0.5 INHALANT RESPIRATORY (INHALATION) at 07:19

## 2017-12-17 RX ADMIN — SODIUM CHLORIDE, SODIUM LACTATE, CALCIUM CHLORIDE, MAGNESIUM CHLORIDE AND DEXTROSE 2500 ML: 1.5; 538; 448; 18.3; 5.08 INJECTION, SOLUTION INTRAPERITONEAL at 14:30

## 2017-12-17 RX ADMIN — HYDROMORPHONE HYDROCHLORIDE 1 MG: 2 INJECTION INTRAMUSCULAR; INTRAVENOUS; SUBCUTANEOUS at 14:29

## 2017-12-17 RX ADMIN — ARFORMOTEROL TARTRATE 15 MCG: 15 SOLUTION RESPIRATORY (INHALATION) at 07:19

## 2017-12-17 RX ADMIN — Medication 10 ML: at 18:22

## 2017-12-17 RX ADMIN — ALLOPURINOL 100 MG: 100 TABLET ORAL at 09:04

## 2017-12-17 RX ADMIN — Medication 1 CAPSULE: at 09:04

## 2017-12-17 RX ADMIN — HYDROMORPHONE HYDROCHLORIDE 1 MG: 2 INJECTION INTRAMUSCULAR; INTRAVENOUS; SUBCUTANEOUS at 09:11

## 2017-12-17 RX ADMIN — HYDROXYCHLOROQUINE SULFATE 200 MG: 200 TABLET, FILM COATED ORAL at 17:06

## 2017-12-17 RX ADMIN — ONDANSETRON 4 MG: 2 INJECTION INTRAMUSCULAR; INTRAVENOUS at 14:11

## 2017-12-17 RX ADMIN — GEMFIBROZIL 300 MG: 600 TABLET ORAL at 09:03

## 2017-12-17 RX ADMIN — SODIUM CHLORIDE, SODIUM LACTATE, CALCIUM CHLORIDE, MAGNESIUM CHLORIDE AND DEXTROSE 2500 ML: 1.5; 538; 448; 18.3; 5.08 INJECTION, SOLUTION INTRAPERITONEAL at 09:00

## 2017-12-17 RX ADMIN — CARVEDILOL 6.25 MG: 6.25 TABLET, FILM COATED ORAL at 09:04

## 2017-12-17 RX ADMIN — HYDROMORPHONE HYDROCHLORIDE 1 MG: 2 INJECTION INTRAMUSCULAR; INTRAVENOUS; SUBCUTANEOUS at 04:46

## 2017-12-17 RX ADMIN — CARVEDILOL 6.25 MG: 6.25 TABLET, FILM COATED ORAL at 17:06

## 2017-12-17 RX ADMIN — POTASSIUM CHLORIDE 20 MEQ: 750 TABLET, FILM COATED, EXTENDED RELEASE ORAL at 09:04

## 2017-12-17 RX ADMIN — SODIUM CHLORIDE, SODIUM LACTATE, CALCIUM CHLORIDE, MAGNESIUM CHLORIDE AND DEXTROSE 2500 ML: 1.5; 538; 448; 18.3; 5.08 INJECTION, SOLUTION INTRAPERITONEAL at 20:39

## 2017-12-17 RX ADMIN — CINACALCET HYDROCHLORIDE 30 MG: 30 TABLET, COATED ORAL at 17:06

## 2017-12-17 RX ADMIN — PANTOPRAZOLE SODIUM 40 MG: 40 TABLET, DELAYED RELEASE ORAL at 06:57

## 2017-12-17 RX ADMIN — Medication 400 MG: at 09:04

## 2017-12-17 RX ADMIN — HYDROMORPHONE HYDROCHLORIDE 1 MG: 2 INJECTION INTRAMUSCULAR; INTRAVENOUS; SUBCUTANEOUS at 18:22

## 2017-12-17 RX ADMIN — SODIUM CHLORIDE, SODIUM LACTATE, CALCIUM CHLORIDE, MAGNESIUM CHLORIDE AND DEXTROSE 2500 ML: 1.5; 538; 448; 18.3; 5.08 INJECTION, SOLUTION INTRAPERITONEAL at 06:57

## 2017-12-17 RX ADMIN — BUDESONIDE 500 MCG: 0.5 INHALANT RESPIRATORY (INHALATION) at 20:29

## 2017-12-17 RX ADMIN — ARFORMOTEROL TARTRATE 15 MCG: 15 SOLUTION RESPIRATORY (INHALATION) at 20:29

## 2017-12-17 RX ADMIN — HYDROXYCHLOROQUINE SULFATE 200 MG: 200 TABLET, FILM COATED ORAL at 09:04

## 2017-12-17 RX ADMIN — FLUCONAZOLE 200 MG: 2 INJECTION INTRAVENOUS at 14:29

## 2017-12-17 RX ADMIN — PREDNISONE 5 MG: 5 TABLET ORAL at 09:04

## 2017-12-17 RX ADMIN — AZATHIOPRINE 50 MG: 50 TABLET ORAL at 09:04

## 2017-12-17 NOTE — PROGRESS NOTES
0800 - Bedside report received from Tommie Mcgill [facility]. Pt resting comfortably in bed in NAD. Just received pain meds. PD currently dwelling. 1130 - Attempted to start new round of PD. After leaving room, clamp popped off and all volume in drained into drainage bag instead of pt. Bag wasted. New bag started. 1930 - Bedside shift change report given to Tommie Mcgill RN (oncoming nurse) by Camila Olszewski, RN (offgoing nurse). Report included the following information SBAR, Kardex, ED Summary, Procedure Summary, Intake/Output, Accordion, Recent Results and Med Rec Status. -- Pt currently taking in PD volume. Opportunity for questions provided. q1h rounds completed during shift.

## 2017-12-17 NOTE — PROGRESS NOTES
TRANSFER - IN REPORT:    Verbal report received from Pura Akins RN(name) on Angella Jimenez  being received from [facility](unit) for routine progression of care      Report consisted of patients Situation, Background, Assessment and   Recommendations(SBAR). Information from the following report(s) SBAR, Intake/Output, MAR and Recent Results was reviewed with the receiving nurse. Opportunity for questions and clarification was provided. Assessment completed upon patients arrival to unit and care assumed.

## 2017-12-17 NOTE — PROGRESS NOTES
Hospitalist Progress Note      Hospital summary: 33 y.o lady with HTN, SLE w/ lupus nephritis, ESRD on PD, dyslipidemia, VTE, who presented with abdominal pain, initial work-up was consistent with peritonitis. She was recently discharged from here for anasarca due to hypoalbuminemia. Assessment/Plan:  Peritonitis with associated sepsis: (POA) Peritoneal fluid cell count showed WBC of 3769 with 90% neutrophils, gram stain with budding yeast. Peritoneal fluid cultures from last admission on 11/27/17 grew light candida parapsilosis. Tachycardia and leukocytosis on admission. Likely due to infected PD catheter. Sepsis syndrome has resolved. -continue IV fluconazole, received intraperitoneal vancomycin and cefepime  -f/u blood cultures - NGTD  -ID, nephrology, vascular surgery consulted. Plan for PD cath removal on Mon 12/18 and transition to HD    Abdominal pain: (POA) likely due to peritonitis. Improving. CT of the abd showed hepatic steatosis and possible thickening of the right colon.  Pain was RUQ predominant, an abd US showed hepatic steatosis and gallbladder sludge    Hypomagnesemia, hypokalemia, hypophosphatemia, hypocalcemia:  -replete potassium today    SLE:  -continue azathioprine, Plaquenil, low-dose prednisone    ESRD: on PD  -nephrology consulted  -continue cinacalcet    Mild hyponatremia    Severe protein-calorie malnutrition: (POA) based on muscle wasting, loss of subq fat, reduced nutritional intake  -nutrition consulted    HTN:  -continue lower dose carvedilol  -holding amlodipine, losartan    Hyperlipidemia:  -continue gemfibrozil and     History of VTE:  -will hold apixaban over the weekend per vascular surgery    Code status: full  DVT prophylaxis: on apixaban - holding for PD cath removal  Disposition: likely home when medically appropriate  ----------------------------------------------    CC: abdominal pain    S: states RUQ abd pain persists, no n/v/d, no dyspnea. Review of Systems:  Pertinent items are noted in HPI. O:  Visit Vitals    /76 (BP 1 Location: Right arm, BP Patient Position: At rest)    Pulse (!) 102    Temp 98.7 °F (37.1 °C)    Resp 18    Ht 5' 5\" (1.651 m)    Wt 63.5 kg (140 lb 1.6 oz)    SpO2 98%    BMI 23.31 kg/m2       PHYSICAL EXAM:  Gen: NAD, chronically-ill appearing  HEENT: anicteric sclerae, normal conjunctiva, MM moist  Neck: supple  Heart: RR, normal rate, no MRG, no JVD, no peripheral edema  Lungs: CTA b/l, non-labored respirations  Abd: soft, mild tenderness in RUQ, PD cath, ND, BS+  Extr: warm  Skin: dry  Neuro: CN II-XII grossly intact, normal speech, moves all extremities  Psych: normal mood, appropriate affect      Intake/Output Summary (Last 24 hours) at 12/17/17 0901  Last data filed at 12/17/17 0657   Gross per 24 hour   Intake             7960 ml   Output             8400 ml   Net             -440 ml        Recent labs & imaging reviewed:  Recent Labs      12/17/17   0435  12/16/17   0348   WBC  11.0  14.6*   HGB  8.2*  8.0*   HCT  25.6*  23.9*   PLT  457*  432*     Recent Labs      12/17/17   0435  12/16/17   0354  12/15/17   0539   NA  134*  134*  132*   K  3.4*  3.2*  3.7   CL  97  98  100   CO2  27  26  24   BUN  34*  35*  37*   CREA  6.09*  6.41*  6.73*   GLU  85  84  84   CA  6.8*  6.7*  7.1*   MG  1.6  1.7  2.0   PHOS  2.5*  2.4*  2.3*     No results for input(s): SGOT, GPT, ALT, AP, TBIL, TBILI, TP, ALB, GLOB, GGT, AML, LPSE in the last 72 hours.     No lab exists for component: AMYP, HLPSE    Lab Results   Component Value Date/Time    Folate 3.9 11/06/2017 11:49 AM        Lab Results   Component Value Date/Time    Cholesterol, total 117 09/25/2015 02:02 PM    HDL Cholesterol 31 09/25/2015 02:02 PM    LDL, calculated 57 09/25/2015 02:02 PM    Triglyceride 146 09/25/2015 02:02 PM    CHOL/HDL Ratio 7.7 05/31/2009 10:30 AM     Lab Results   Component Value Date/Time    Glucose (POC) 95 09/16/2015 12:08 PM Glucose (POC) 116 09/03/2013 06:20 AM    Glucose (POC) 151 09/02/2013 09:07 PM    Glucose (POC) 162 09/02/2013 04:57 PM    Glucose (POC) 133 09/02/2013 11:58 AM    Glucose (POC) 203 09/01/2013 11:02 PM     Lab Results   Component Value Date/Time    Color YELLOW/STRAW 08/12/2016 09:06 PM    Appearance CLEAR 08/12/2016 09:06 PM    Specific gravity 1.017 08/12/2016 09:06 PM    Specific gravity 1.010 07/11/2016 12:44 PM    pH (UA) 7.0 08/12/2016 09:06 PM    Protein 300 08/12/2016 09:06 PM    Glucose NEGATIVE  08/12/2016 09:06 PM    Ketone TRACE 08/12/2016 09:06 PM    Bilirubin NEGATIVE  07/11/2016 12:44 PM    Urobilinogen 1.0 08/12/2016 09:06 PM    Nitrites NEGATIVE  08/12/2016 09:06 PM    Leukocyte Esterase NEGATIVE  08/12/2016 09:06 PM    Epithelial cells MODERATE 08/12/2016 09:06 PM    Bacteria 1+ 08/12/2016 09:06 PM    WBC 0-4 08/12/2016 09:06 PM    RBC 0-5 08/12/2016 09:06 PM       Med list reviewed  Current Facility-Administered Medications   Medication Dose Route Frequency    potassium chloride SR (KLOR-CON 10) tablet 20 mEq  20 mEq Oral NOW    magnesium oxide (MAG-OX) tablet 400 mg  400 mg Oral NOW    carvedilol (COREG) tablet 6.25 mg  6.25 mg Oral BID WITH MEALS    allopurinol (ZYLOPRIM) tablet 100 mg  100 mg Oral DAILY    azaTHIOprine (IMURAN) tablet 50 mg  50 mg Oral DAILY AFTER BREAKFAST    hydroxychloroquine (PLAQUENIL) tablet 200 mg  200 mg Oral BID    oxyCODONE IR (ROXICODONE) tablet 5 mg  5 mg Oral Q6H PRN    pantoprazole (PROTONIX) tablet 40 mg  40 mg Oral ACB    senna (SENOKOT) tablet 8.6 mg  1 Tab Oral QHS PRN    HYDROmorphone (DILAUDID) injection 1 mg  1 mg IntraVENous Q4H PRN    ondansetron (ZOFRAN) injection 4 mg  4 mg IntraVENous Q4H PRN    lactobac ac& pc-s.therm-b.anim (AJIT Q/RISAQUAD)  1 Cap Oral DAILY    albuterol (PROVENTIL VENTOLIN) nebulizer solution 2.5 mg  2.5 mg Nebulization Q4H PRN    arformoterol (BROVANA) neb solution 15 mcg  15 mcg Nebulization BID RT    And    budesonide (PULMICORT) 500 mcg/2 ml nebulizer suspension  500 mcg Nebulization BID RT    gemfibrozil (LOPID) tablet 300 mg  300 mg Oral DAILY    predniSONE (DELTASONE) tablet 5 mg  5 mg Oral DAILY WITH BREAKFAST    cinacalcet (SENSIPAR) tablet 30 mg  30 mg Oral DAILY WITH DINNER    fluconazole (DIFLUCAN) 200mg/100 mL IVPB (premix)  200 mg IntraVENous Q24H    peritoneal dialysis DEXTROSE 1.5% (2.5 mEq/L low calcium) solution 2,500 mL  2,500 mL IntraPERitoneal 5XD    acetaminophen (TYLENOL) tablet 650 mg  650 mg Oral Q4H PRN       Care Plan discussed with:  Patient/Family    Faviola Fonseca MD  Internal Medicine  Date of Service: 12/17/2017

## 2017-12-17 NOTE — PROGRESS NOTES
1650 - Attempted to call 3N to give report. RN told me she needed 15 mins. Awaiting callback. 1650 - TRANSFER - OUT REPORT:    Verbal report given to Luana Ghotra RN(name) on Angella Jimenez  being transferred to 3(unit) for routine progression of care       Report consisted of patients Situation, Background, Assessment and   Recommendations(SBAR). Information from the following report(s) SBAR, Kardex, ED Summary, Procedure Summary, Intake/Output, MAR, Recent Results and Med Rec Status was reviewed with the receiving nurse. Lines:   Peripheral IV 12/14/17 Right Arm (Active)   Site Assessment Clean, dry, & intact 12/17/2017  4:00 PM   Phlebitis Assessment 0 12/17/2017  4:00 PM   Infiltration Assessment 0 12/17/2017  4:00 PM   Dressing Status Clean, dry, & intact 12/17/2017  4:00 PM   Dressing Type Transparent;Tape 12/17/2017  4:00 PM   Hub Color/Line Status Blue;Capped 12/17/2017  4:00 PM   Action Taken Open ports on tubing capped 12/17/2017  4:00 PM   Alcohol Cap Used Yes 12/17/2017  4:00 PM        Opportunity for questions and clarification was provided. q1h rounds completed during shift.      Patient transported with:   Patient-specific medications from [facility]

## 2017-12-17 NOTE — PROGRESS NOTES
Name: Sourav Belle   MRN: [id number]  : 1985      Assessment:  ESRD - on CCPD as outpt  HTN>>low nl BP now  Peritonitis- fungal infection  Sepsis- d/t peritonitis; improved  Anemia of ESRD  Malnutrition- albumin <2  SLE- with remote hx of Lupus enteritis (before she went on HD). Repeat C3 is mild low; C4 is nl  PE- during recent previous admit  Hypokalemia      Plan/Recommendations:    Ct CAPD in house; use 1.5% - 5 exchanges per day    S/p IP Vanc + Cefepime on ; cultures negative so far except candida  Ct antifungal    Eliquis to be held on , followed by PD catheter removal    Will ask Dr. Jaz Coburn to place [facility]) at same time  If not will consult IR for permacath on Tuesday, followed by HD    Ms. Casper Branch is deciding on where she would like to do outpatient HD (she feels very comfortable with Dr. Raji Chew as her kidney doctor, but lives near [facility]) - she will let us know her decision. Off Losartan/norvasc and Coreg reduced d/t hypotension    Subjective:  Feels  Better. No abd pain   PD fluid clear    ROS:   No nausea, no vomiting  No chest pain, no shortness of breath    Exam:  Visit Vitals    /76 (BP 1 Location: Right arm, BP Patient Position: At rest)    Pulse (!) 102    Temp 98.7 °F (37.1 °C)    Resp 18    Ht 5' 5\" (1.651 m)    Wt 63.5 kg (140 lb 1.6 oz)    SpO2 98%    BMI 23.31 kg/m2       Gen: NAD  HEENT: No icterus, mmm,  AT/NC  Lungs/Chest wall: Clear. Equal BS. No respiratory distress  Cardiovascular: Regular rate, normal rhythm. Abdomen/: Soft, NT,   BS+  Ext: No peripheral edema  CNS: alert and awake.      Current Facility-Administered Medications   Medication Dose Route Frequency Last Dose    carvedilol (COREG) tablet 6.25 mg  6.25 mg Oral BID WITH MEALS 6.25 mg at 17 8410    allopurinol (ZYLOPRIM) tablet 100 mg  100 mg Oral DAILY 100 mg at 12/16/17 0926    azaTHIOprine (IMURAN) tablet 50 mg  50 mg Oral DAILY AFTER BREAKFAST 50 mg at 12/16/17 0557    hydroxychloroquine (PLAQUENIL) tablet 200 mg  200 mg Oral  mg at 12/16/17 1721    oxyCODONE IR (ROXICODONE) tablet 5 mg  5 mg Oral Q6H PRN 5 mg at 12/14/17 1928    pantoprazole (PROTONIX) tablet 40 mg  40 mg Oral ACB 40 mg at 12/17/17 0657    senna (SENOKOT) tablet 8.6 mg  1 Tab Oral QHS PRN      HYDROmorphone (DILAUDID) injection 1 mg  1 mg IntraVENous Q4H PRN 1 mg at 12/17/17 0446    ondansetron (ZOFRAN) injection 4 mg  4 mg IntraVENous Q4H PRN 4 mg at 12/16/17 1721    lactobac ac& pc-s.therm-b.anim (AJIT Q/RISAQUAD)  1 Cap Oral DAILY 1 Cap at 12/16/17 0927    albuterol (PROVENTIL VENTOLIN) nebulizer solution 2.5 mg  2.5 mg Nebulization Q4H PRN      arformoterol (BROVANA) neb solution 15 mcg  15 mcg Nebulization BID RT 15 mcg at 12/17/17 0719    And    budesonide (PULMICORT) 500 mcg/2 ml nebulizer suspension  500 mcg Nebulization BID  mcg at 12/17/17 0719    gemfibrozil (LOPID) tablet 300 mg  300 mg Oral DAILY 300 mg at 12/16/17 5606    predniSONE (DELTASONE) tablet 5 mg  5 mg Oral DAILY WITH BREAKFAST 5 mg at 12/16/17 4392    cinacalcet (SENSIPAR) tablet 30 mg  30 mg Oral DAILY WITH DINNER 30 mg at 12/16/17 1721    fluconazole (DIFLUCAN) 200mg/100 mL IVPB (premix)  200 mg IntraVENous Q24H 200 mg at 12/16/17 1432    peritoneal dialysis DEXTROSE 1.5% (2.5 mEq/L low calcium) solution 2,500 mL  2,500 mL IntraPERitoneal 5XD 2,500 mL at 12/17/17 0657    acetaminophen (TYLENOL) tablet 650 mg  650 mg Oral Q4H PRN         Labs/Data:    Lab Results   Component Value Date/Time    WBC 11.0 12/17/2017 04:35 AM    Hemoglobin (POC) 11.2 09/16/2015 12:08 PM    HGB 8.2 12/17/2017 04:35 AM    Hematocrit (POC) 33 09/16/2015 12:08 PM    HCT 25.6 12/17/2017 04:35 AM    PLATELET 830 53/13/2453 04:35 AM    MCV 85.6 12/17/2017 04:35 AM       Lab Results   Component Value Date/Time    Sodium 134 12/17/2017 04:35 AM    Potassium 3.4 12/17/2017 04:35 AM    Chloride 97 12/17/2017 04:35 AM    CO2 27 12/17/2017 04:35 AM    Anion gap 10 12/17/2017 04:35 AM    Glucose 85 12/17/2017 04:35 AM    BUN 34 12/17/2017 04:35 AM    Creatinine 6.09 12/17/2017 04:35 AM    BUN/Creatinine ratio 6 12/17/2017 04:35 AM    GFR est AA 10 12/17/2017 04:35 AM    GFR est non-AA 8 12/17/2017 04:35 AM    Calcium 6.8 12/17/2017 04:35 AM       Wt Readings from Last 3 Encounters:   12/17/17 63.5 kg (140 lb 1.6 oz)   12/08/17 65.1 kg (143 lb 9.6 oz)   12/08/17 64.9 kg (143 lb)         Intake/Output Summary (Last 24 hours) at 12/17/17 0734  Last data filed at 12/17/17 0657   Gross per 24 hour   Intake             8200 ml   Output            15752 ml   Net            -2800 ml       Patient seen and examined. Chart reviewed. Labs, data and other pertinent notes reviewed in last 24 hrs.     PMH/SH/FH reviewed and unchanged compared to H&P    Discussed with pt, RN      Debbie Butler MD

## 2017-12-17 NOTE — PROGRESS NOTES
Problem: Falls - Risk of  Goal: *Absence of Falls  Document Alex Fall Risk and appropriate interventions in the flowsheet. Outcome: Progressing Towards Goal  Fall Risk Interventions:  Mobility Interventions: Communicate number of staff needed for ambulation/transfer, PT Consult for mobility concerns  Medication Interventions: Patient to call before getting OOB  Elimination Interventions: Bed/chair exit alarm, Patient to call for help with toileting needs, Toileting schedule/hourly rounds  Patient has not fallen. Hourly rounding performed. Bedside shift change report given to [address] [place] [address] (oncoming nurse) by [id number] [address] [place] [address] (offgoing nurse). Report included the following information SBAR, Kardex, Intake/Output, MAR, Recent Results and Cardiac Rhythm NSR.

## 2017-12-18 ENCOUNTER — ANESTHESIA (OUTPATIENT)
Dept: SURGERY | Age: 32
DRG: 981 | End: 2017-12-18
Payer: MEDICARE

## 2017-12-18 ENCOUNTER — ANESTHESIA EVENT (OUTPATIENT)
Dept: SURGERY | Age: 32
DRG: 981 | End: 2017-12-18
Payer: MEDICARE

## 2017-12-18 ENCOUNTER — APPOINTMENT (OUTPATIENT)
Dept: GENERAL RADIOLOGY | Age: 32
DRG: 981 | End: 2017-12-18
Attending: SURGERY
Payer: MEDICARE

## 2017-12-18 LAB
ANNOTATION COMMENT IMP: NORMAL
M TB IFN-G CD4+ BCKGRND COR BLD-ACNC: 0 IU/ML
M TB IFN-G CD4+ T-CELLS BLD-ACNC: 0.05 IU/ML
M TB TUBERC IFN-G BLD QL: NORMAL
M TB TUBERC IGNF/MITOGEN IGNF CONTROL: 0.09 IU/ML
QUANTIFERON NIL VALUE: 0.05 IU/ML
SERVICE CMNT-IMP: NORMAL

## 2017-12-18 PROCEDURE — 77030008684 HC TU ET CUF COVD -B: Performed by: ANESTHESIOLOGY

## 2017-12-18 PROCEDURE — 87205 SMEAR GRAM STAIN: CPT | Performed by: EMERGENCY MEDICINE

## 2017-12-18 PROCEDURE — 74011000258 HC RX REV CODE- 258: Performed by: SURGERY

## 2017-12-18 PROCEDURE — 74011250636 HC RX REV CODE- 250/636: Performed by: INTERNAL MEDICINE

## 2017-12-18 PROCEDURE — 87102 FUNGUS ISOLATION CULTURE: CPT | Performed by: EMERGENCY MEDICINE

## 2017-12-18 PROCEDURE — 74011000250 HC RX REV CODE- 250: Performed by: INTERNAL MEDICINE

## 2017-12-18 PROCEDURE — 74011000272 HC RX REV CODE- 272: Performed by: SURGERY

## 2017-12-18 PROCEDURE — 74011000258 HC RX REV CODE- 258: Performed by: ANESTHESIOLOGY

## 2017-12-18 PROCEDURE — 76010000153 HC OR TIME 1.5 TO 2 HR: Performed by: SURGERY

## 2017-12-18 PROCEDURE — A4722 DIALYS SOL FLD VOL > 1999CC: HCPCS | Performed by: INTERNAL MEDICINE

## 2017-12-18 PROCEDURE — C1750 CATH, HEMODIALYSIS,LONG-TERM: HCPCS

## 2017-12-18 PROCEDURE — 74011250637 HC RX REV CODE- 250/637: Performed by: INTERNAL MEDICINE

## 2017-12-18 PROCEDURE — 77030026438 HC STYL ET INTUB CARD -A: Performed by: ANESTHESIOLOGY

## 2017-12-18 PROCEDURE — 77030031139 HC SUT VCRL2 J&J -A: Performed by: SURGERY

## 2017-12-18 PROCEDURE — 77030002996 HC SUT SLK J&J -A: Performed by: SURGERY

## 2017-12-18 PROCEDURE — 0WPG03Z REMOVAL OF INFUSION DEVICE FROM PERITONEAL CAVITY, OPEN APPROACH: ICD-10-PCS | Performed by: SURGERY

## 2017-12-18 PROCEDURE — 86923 COMPATIBILITY TEST ELECTRIC: CPT | Performed by: SURGERY

## 2017-12-18 PROCEDURE — 77030011640 HC PAD GRND REM COVD -A: Performed by: SURGERY

## 2017-12-18 PROCEDURE — 36415 COLL VENOUS BLD VENIPUNCTURE: CPT | Performed by: SURGERY

## 2017-12-18 PROCEDURE — 74011250636 HC RX REV CODE- 250/636: Performed by: SURGERY

## 2017-12-18 PROCEDURE — 74011250636 HC RX REV CODE- 250/636

## 2017-12-18 PROCEDURE — 77030008467 HC STPLR SKN COVD -B: Performed by: SURGERY

## 2017-12-18 PROCEDURE — 74011250636 HC RX REV CODE- 250/636: Performed by: ANESTHESIOLOGY

## 2017-12-18 PROCEDURE — 77030018836 HC SOL IRR NACL ICUM -A: Performed by: SURGERY

## 2017-12-18 PROCEDURE — 94640 AIRWAY INHALATION TREATMENT: CPT

## 2017-12-18 PROCEDURE — 87106 FUNGI IDENTIFICATION YEAST: CPT | Performed by: EMERGENCY MEDICINE

## 2017-12-18 PROCEDURE — C1750 CATH, HEMODIALYSIS,LONG-TERM: HCPCS | Performed by: SURGERY

## 2017-12-18 PROCEDURE — 74011250636 HC RX REV CODE- 250/636: Performed by: HOSPITALIST

## 2017-12-18 PROCEDURE — 76000 FLUOROSCOPY <1 HR PHYS/QHP: CPT

## 2017-12-18 PROCEDURE — 65210000002 HC RM PRIVATE GYN

## 2017-12-18 PROCEDURE — 02H633Z INSERTION OF INFUSION DEVICE INTO RIGHT ATRIUM, PERCUTANEOUS APPROACH: ICD-10-PCS | Performed by: SURGERY

## 2017-12-18 PROCEDURE — 77030019908 HC STETH ESOPH SIMS -A: Performed by: ANESTHESIOLOGY

## 2017-12-18 PROCEDURE — 74011000250 HC RX REV CODE- 250: Performed by: SURGERY

## 2017-12-18 PROCEDURE — 76210000016 HC OR PH I REC 1 TO 1.5 HR: Performed by: SURGERY

## 2017-12-18 PROCEDURE — 77030013079 HC BLNKT BAIR HGGR 3M -A: Performed by: ANESTHESIOLOGY

## 2017-12-18 PROCEDURE — 76060000034 HC ANESTHESIA 1.5 TO 2 HR: Performed by: SURGERY

## 2017-12-18 PROCEDURE — 86900 BLOOD TYPING SEROLOGIC ABO: CPT | Performed by: SURGERY

## 2017-12-18 PROCEDURE — 49421 INS TUN IP CATH FOR DIAL OPN: CPT

## 2017-12-18 PROCEDURE — 77030002986 HC SUT PROL J&J -A: Performed by: SURGERY

## 2017-12-18 RX ORDER — SODIUM CHLORIDE 0.9 % (FLUSH) 0.9 %
5-10 SYRINGE (ML) INJECTION EVERY 8 HOURS
Status: DISCONTINUED | OUTPATIENT
Start: 2017-12-18 | End: 2017-12-22 | Stop reason: HOSPADM

## 2017-12-18 RX ORDER — LIDOCAINE HYDROCHLORIDE 10 MG/ML
0.1 INJECTION, SOLUTION EPIDURAL; INFILTRATION; INTRACAUDAL; PERINEURAL AS NEEDED
Status: DISCONTINUED | OUTPATIENT
Start: 2017-12-18 | End: 2017-12-22 | Stop reason: HOSPADM

## 2017-12-18 RX ORDER — LIDOCAINE HYDROCHLORIDE 10 MG/ML
0.5 INJECTION, SOLUTION EPIDURAL; INFILTRATION; INTRACAUDAL; PERINEURAL AS NEEDED
Status: DISCONTINUED | OUTPATIENT
Start: 2017-12-18 | End: 2017-12-22 | Stop reason: HOSPADM

## 2017-12-18 RX ORDER — MIDAZOLAM HYDROCHLORIDE 1 MG/ML
0.5 INJECTION, SOLUTION INTRAMUSCULAR; INTRAVENOUS
Status: DISCONTINUED | OUTPATIENT
Start: 2017-12-18 | End: 2017-12-18 | Stop reason: HOSPADM

## 2017-12-18 RX ORDER — FENTANYL CITRATE 50 UG/ML
25 INJECTION, SOLUTION INTRAMUSCULAR; INTRAVENOUS
Status: DISCONTINUED | OUTPATIENT
Start: 2017-12-18 | End: 2017-12-18 | Stop reason: HOSPADM

## 2017-12-18 RX ORDER — SODIUM CHLORIDE, SODIUM LACTATE, POTASSIUM CHLORIDE, CALCIUM CHLORIDE 600; 310; 30; 20 MG/100ML; MG/100ML; MG/100ML; MG/100ML
75 INJECTION, SOLUTION INTRAVENOUS CONTINUOUS
Status: DISCONTINUED | OUTPATIENT
Start: 2017-12-18 | End: 2017-12-19

## 2017-12-18 RX ORDER — MORPHINE SULFATE 10 MG/ML
2 INJECTION, SOLUTION INTRAMUSCULAR; INTRAVENOUS
Status: DISCONTINUED | OUTPATIENT
Start: 2017-12-18 | End: 2017-12-18 | Stop reason: HOSPADM

## 2017-12-18 RX ORDER — DEXTROSE, SODIUM CHLORIDE, SODIUM LACTATE, POTASSIUM CHLORIDE, AND CALCIUM CHLORIDE 5; .6; .31; .03; .02 G/100ML; G/100ML; G/100ML; G/100ML; G/100ML
125 INJECTION, SOLUTION INTRAVENOUS CONTINUOUS
Status: DISCONTINUED | OUTPATIENT
Start: 2017-12-18 | End: 2017-12-19

## 2017-12-18 RX ORDER — MIDAZOLAM HYDROCHLORIDE 1 MG/ML
1 INJECTION, SOLUTION INTRAMUSCULAR; INTRAVENOUS AS NEEDED
Status: DISCONTINUED | OUTPATIENT
Start: 2017-12-18 | End: 2017-12-20

## 2017-12-18 RX ORDER — SODIUM CHLORIDE 0.9 % (FLUSH) 0.9 %
5-10 SYRINGE (ML) INJECTION AS NEEDED
Status: DISCONTINUED | OUTPATIENT
Start: 2017-12-18 | End: 2017-12-18 | Stop reason: HOSPADM

## 2017-12-18 RX ORDER — SUCCINYLCHOLINE CHLORIDE 20 MG/ML
INJECTION INTRAMUSCULAR; INTRAVENOUS AS NEEDED
Status: DISCONTINUED | OUTPATIENT
Start: 2017-12-18 | End: 2017-12-18 | Stop reason: HOSPADM

## 2017-12-18 RX ORDER — MIDAZOLAM HYDROCHLORIDE 1 MG/ML
1 INJECTION, SOLUTION INTRAMUSCULAR; INTRAVENOUS
Status: DISCONTINUED | OUTPATIENT
Start: 2017-12-18 | End: 2017-12-18 | Stop reason: HOSPADM

## 2017-12-18 RX ORDER — OXYCODONE HYDROCHLORIDE 5 MG/1
5 TABLET ORAL AS NEEDED
Status: DISCONTINUED | OUTPATIENT
Start: 2017-12-18 | End: 2017-12-18 | Stop reason: HOSPADM

## 2017-12-18 RX ORDER — PROPOFOL 10 MG/ML
INJECTION, EMULSION INTRAVENOUS AS NEEDED
Status: DISCONTINUED | OUTPATIENT
Start: 2017-12-18 | End: 2017-12-18 | Stop reason: HOSPADM

## 2017-12-18 RX ORDER — OXYCODONE AND ACETAMINOPHEN 5; 325 MG/1; MG/1
1 TABLET ORAL AS NEEDED
Status: DISCONTINUED | OUTPATIENT
Start: 2017-12-18 | End: 2017-12-18 | Stop reason: HOSPADM

## 2017-12-18 RX ORDER — HYDROMORPHONE HYDROCHLORIDE 2 MG/ML
INJECTION, SOLUTION INTRAMUSCULAR; INTRAVENOUS; SUBCUTANEOUS AS NEEDED
Status: DISCONTINUED | OUTPATIENT
Start: 2017-12-18 | End: 2017-12-18 | Stop reason: HOSPADM

## 2017-12-18 RX ORDER — LIDOCAINE HYDROCHLORIDE 20 MG/ML
INJECTION, SOLUTION EPIDURAL; INFILTRATION; INTRACAUDAL; PERINEURAL AS NEEDED
Status: DISCONTINUED | OUTPATIENT
Start: 2017-12-18 | End: 2017-12-18 | Stop reason: HOSPADM

## 2017-12-18 RX ORDER — ONDANSETRON 2 MG/ML
4 INJECTION INTRAMUSCULAR; INTRAVENOUS AS NEEDED
Status: DISCONTINUED | OUTPATIENT
Start: 2017-12-18 | End: 2017-12-18 | Stop reason: HOSPADM

## 2017-12-18 RX ORDER — CEFAZOLIN SODIUM/WATER 2 G/20 ML
2 SYRINGE (ML) INTRAVENOUS
Status: COMPLETED | OUTPATIENT
Start: 2017-12-18 | End: 2017-12-18

## 2017-12-18 RX ORDER — FENTANYL CITRATE 50 UG/ML
50 INJECTION, SOLUTION INTRAMUSCULAR; INTRAVENOUS AS NEEDED
Status: DISCONTINUED | OUTPATIENT
Start: 2017-12-18 | End: 2017-12-20

## 2017-12-18 RX ORDER — SODIUM CHLORIDE, SODIUM LACTATE, POTASSIUM CHLORIDE, CALCIUM CHLORIDE 600; 310; 30; 20 MG/100ML; MG/100ML; MG/100ML; MG/100ML
125 INJECTION, SOLUTION INTRAVENOUS CONTINUOUS
Status: DISCONTINUED | OUTPATIENT
Start: 2017-12-18 | End: 2017-12-18 | Stop reason: HOSPADM

## 2017-12-18 RX ORDER — DIPHENHYDRAMINE HYDROCHLORIDE 50 MG/ML
12.5 INJECTION, SOLUTION INTRAMUSCULAR; INTRAVENOUS AS NEEDED
Status: DISCONTINUED | OUTPATIENT
Start: 2017-12-18 | End: 2017-12-18 | Stop reason: HOSPADM

## 2017-12-18 RX ORDER — PHENYLEPHRINE HCL IN 0.9% NACL 0.4MG/10ML
SYRINGE (ML) INTRAVENOUS AS NEEDED
Status: DISCONTINUED | OUTPATIENT
Start: 2017-12-18 | End: 2017-12-18 | Stop reason: HOSPADM

## 2017-12-18 RX ORDER — SODIUM CHLORIDE 0.9 % (FLUSH) 0.9 %
SYRINGE (ML) INJECTION
Status: DISPENSED
Start: 2017-12-18 | End: 2017-12-18

## 2017-12-18 RX ORDER — FENTANYL CITRATE 50 UG/ML
INJECTION, SOLUTION INTRAMUSCULAR; INTRAVENOUS AS NEEDED
Status: DISCONTINUED | OUTPATIENT
Start: 2017-12-18 | End: 2017-12-18 | Stop reason: HOSPADM

## 2017-12-18 RX ORDER — SODIUM CHLORIDE 9 MG/ML
50 INJECTION, SOLUTION INTRAVENOUS CONTINUOUS
Status: DISCONTINUED | OUTPATIENT
Start: 2017-12-18 | End: 2017-12-18 | Stop reason: HOSPADM

## 2017-12-18 RX ORDER — MIDAZOLAM HYDROCHLORIDE 1 MG/ML
INJECTION, SOLUTION INTRAMUSCULAR; INTRAVENOUS AS NEEDED
Status: DISCONTINUED | OUTPATIENT
Start: 2017-12-18 | End: 2017-12-18 | Stop reason: HOSPADM

## 2017-12-18 RX ORDER — SODIUM CHLORIDE 9 MG/ML
50 INJECTION, SOLUTION INTRAVENOUS CONTINUOUS
Status: DISCONTINUED | OUTPATIENT
Start: 2017-12-18 | End: 2017-12-19

## 2017-12-18 RX ORDER — SODIUM CHLORIDE 0.9 % (FLUSH) 0.9 %
5-10 SYRINGE (ML) INJECTION AS NEEDED
Status: DISCONTINUED | OUTPATIENT
Start: 2017-12-18 | End: 2017-12-20

## 2017-12-18 RX ORDER — FENTANYL CITRATE 50 UG/ML
25 INJECTION, SOLUTION INTRAMUSCULAR; INTRAVENOUS
Status: COMPLETED | OUTPATIENT
Start: 2017-12-18 | End: 2017-12-18

## 2017-12-18 RX ORDER — SODIUM CHLORIDE, SODIUM LACTATE, POTASSIUM CHLORIDE, CALCIUM CHLORIDE 600; 310; 30; 20 MG/100ML; MG/100ML; MG/100ML; MG/100ML
75 INJECTION, SOLUTION INTRAVENOUS CONTINUOUS
Status: DISCONTINUED | OUTPATIENT
Start: 2017-12-18 | End: 2017-12-18 | Stop reason: HOSPADM

## 2017-12-18 RX ORDER — ROCURONIUM BROMIDE 10 MG/ML
INJECTION, SOLUTION INTRAVENOUS AS NEEDED
Status: DISCONTINUED | OUTPATIENT
Start: 2017-12-18 | End: 2017-12-18 | Stop reason: HOSPADM

## 2017-12-18 RX ORDER — SODIUM CHLORIDE, SODIUM LACTATE, POTASSIUM CHLORIDE, CALCIUM CHLORIDE 600; 310; 30; 20 MG/100ML; MG/100ML; MG/100ML; MG/100ML
125 INJECTION, SOLUTION INTRAVENOUS CONTINUOUS
Status: DISCONTINUED | OUTPATIENT
Start: 2017-12-18 | End: 2017-12-19

## 2017-12-18 RX ORDER — SODIUM CHLORIDE 9 MG/ML
INJECTION, SOLUTION INTRAVENOUS
Status: DISCONTINUED | OUTPATIENT
Start: 2017-12-18 | End: 2017-12-18 | Stop reason: HOSPADM

## 2017-12-18 RX ORDER — SODIUM CHLORIDE 0.9 % (FLUSH) 0.9 %
5-10 SYRINGE (ML) INJECTION AS NEEDED
Status: DISCONTINUED | OUTPATIENT
Start: 2017-12-18 | End: 2017-12-22 | Stop reason: HOSPADM

## 2017-12-18 RX ADMIN — ARFORMOTEROL TARTRATE 15 MCG: 15 SOLUTION RESPIRATORY (INHALATION) at 07:15

## 2017-12-18 RX ADMIN — SUCCINYLCHOLINE CHLORIDE 140 MG: 20 INJECTION INTRAMUSCULAR; INTRAVENOUS at 13:38

## 2017-12-18 RX ADMIN — SODIUM CHLORIDE: 9 INJECTION, SOLUTION INTRAVENOUS at 13:21

## 2017-12-18 RX ADMIN — MIDAZOLAM HYDROCHLORIDE 2 MG: 1 INJECTION, SOLUTION INTRAMUSCULAR; INTRAVENOUS at 13:26

## 2017-12-18 RX ADMIN — HYDROMORPHONE HYDROCHLORIDE 1 MG: 2 INJECTION INTRAMUSCULAR; INTRAVENOUS; SUBCUTANEOUS at 00:40

## 2017-12-18 RX ADMIN — LIDOCAINE HYDROCHLORIDE 60 MG: 20 INJECTION, SOLUTION EPIDURAL; INFILTRATION; INTRACAUDAL; PERINEURAL at 13:38

## 2017-12-18 RX ADMIN — FENTANYL CITRATE 25 MCG: 50 INJECTION, SOLUTION INTRAMUSCULAR; INTRAVENOUS at 15:50

## 2017-12-18 RX ADMIN — ROCURONIUM BROMIDE 5 MG: 10 INJECTION, SOLUTION INTRAVENOUS at 13:38

## 2017-12-18 RX ADMIN — FENTANYL CITRATE 25 MCG: 50 INJECTION, SOLUTION INTRAMUSCULAR; INTRAVENOUS at 13:26

## 2017-12-18 RX ADMIN — SODIUM CHLORIDE, SODIUM LACTATE, CALCIUM CHLORIDE, MAGNESIUM CHLORIDE AND DEXTROSE 2500 ML: 1.5; 538; 448; 18.3; 5.08 INJECTION, SOLUTION INTRAPERITONEAL at 07:35

## 2017-12-18 RX ADMIN — PROPOFOL 100 MG: 10 INJECTION, EMULSION INTRAVENOUS at 13:38

## 2017-12-18 RX ADMIN — FENTANYL CITRATE 50 MCG: 50 INJECTION, SOLUTION INTRAMUSCULAR; INTRAVENOUS at 13:13

## 2017-12-18 RX ADMIN — HYDROMORPHONE HYDROCHLORIDE 1 MG: 2 INJECTION INTRAMUSCULAR; INTRAVENOUS; SUBCUTANEOUS at 21:11

## 2017-12-18 RX ADMIN — BUDESONIDE 500 MCG: 0.5 INHALANT RESPIRATORY (INHALATION) at 20:52

## 2017-12-18 RX ADMIN — ONDANSETRON 4 MG: 2 INJECTION INTRAMUSCULAR; INTRAVENOUS at 00:40

## 2017-12-18 RX ADMIN — FENTANYL CITRATE 25 MCG: 50 INJECTION, SOLUTION INTRAMUSCULAR; INTRAVENOUS at 16:15

## 2017-12-18 RX ADMIN — Medication 120 MCG: at 13:46

## 2017-12-18 RX ADMIN — Medication 5 ML: at 22:00

## 2017-12-18 RX ADMIN — HYDROMORPHONE HYDROCHLORIDE 1 MG: 2 INJECTION INTRAMUSCULAR; INTRAVENOUS; SUBCUTANEOUS at 17:17

## 2017-12-18 RX ADMIN — FENTANYL CITRATE 25 MCG: 50 INJECTION, SOLUTION INTRAMUSCULAR; INTRAVENOUS at 14:39

## 2017-12-18 RX ADMIN — FENTANYL CITRATE 75 MCG: 50 INJECTION, SOLUTION INTRAMUSCULAR; INTRAVENOUS at 13:38

## 2017-12-18 RX ADMIN — SODIUM CHLORIDE 50 ML/HR: 900 INJECTION, SOLUTION INTRAVENOUS at 13:14

## 2017-12-18 RX ADMIN — HYDROXYCHLOROQUINE SULFATE 200 MG: 200 TABLET, FILM COATED ORAL at 19:49

## 2017-12-18 RX ADMIN — HYDROMORPHONE HYDROCHLORIDE 1 MG: 2 INJECTION INTRAMUSCULAR; INTRAVENOUS; SUBCUTANEOUS at 09:34

## 2017-12-18 RX ADMIN — ARFORMOTEROL TARTRATE 15 MCG: 15 SOLUTION RESPIRATORY (INHALATION) at 20:52

## 2017-12-18 RX ADMIN — FENTANYL CITRATE 50 MCG: 50 INJECTION, SOLUTION INTRAMUSCULAR; INTRAVENOUS at 14:00

## 2017-12-18 RX ADMIN — SODIUM CHLORIDE, SODIUM LACTATE, CALCIUM CHLORIDE, MAGNESIUM CHLORIDE AND DEXTROSE 2500 ML: 1.5; 538; 448; 18.3; 5.08 INJECTION, SOLUTION INTRAPERITONEAL at 11:36

## 2017-12-18 RX ADMIN — ONDANSETRON 4 MG: 2 INJECTION INTRAMUSCULAR; INTRAVENOUS at 04:45

## 2017-12-18 RX ADMIN — BUDESONIDE 500 MCG: 0.5 INHALANT RESPIRATORY (INHALATION) at 07:15

## 2017-12-18 RX ADMIN — SODIUM CHLORIDE, SODIUM LACTATE, CALCIUM CHLORIDE, MAGNESIUM CHLORIDE AND DEXTROSE 2500 ML: 1.5; 538; 448; 18.3; 5.08 INJECTION, SOLUTION INTRAPERITONEAL at 01:04

## 2017-12-18 RX ADMIN — Medication 2 G: at 13:47

## 2017-12-18 RX ADMIN — OXYCODONE HYDROCHLORIDE 5 MG: 5 TABLET ORAL at 20:02

## 2017-12-18 RX ADMIN — FENTANYL CITRATE 25 MCG: 50 INJECTION, SOLUTION INTRAMUSCULAR; INTRAVENOUS at 15:45

## 2017-12-18 RX ADMIN — FENTANYL CITRATE 25 MCG: 50 INJECTION, SOLUTION INTRAMUSCULAR; INTRAVENOUS at 16:00

## 2017-12-18 RX ADMIN — HYDROMORPHONE HYDROCHLORIDE 1 MG: 2 INJECTION INTRAMUSCULAR; INTRAVENOUS; SUBCUTANEOUS at 04:45

## 2017-12-18 RX ADMIN — CARVEDILOL 6.25 MG: 6.25 TABLET, FILM COATED ORAL at 19:49

## 2017-12-18 RX ADMIN — FLUCONAZOLE 200 MG: 2 INJECTION INTRAVENOUS at 20:56

## 2017-12-18 RX ADMIN — Medication 40 MCG: at 14:29

## 2017-12-18 RX ADMIN — FENTANYL CITRATE 50 MCG: 50 INJECTION, SOLUTION INTRAMUSCULAR; INTRAVENOUS at 14:52

## 2017-12-18 RX ADMIN — HYDROMORPHONE HYDROCHLORIDE 0.4 MG: 2 INJECTION, SOLUTION INTRAMUSCULAR; INTRAVENOUS; SUBCUTANEOUS at 14:55

## 2017-12-18 NOTE — BRIEF OP NOTE
BRIEF OPERATIVE NOTE    Date of Procedure: 12/18/2017   Preoperative Diagnosis: ESRD  Postoperative Diagnosis: end stage renal disease    Procedure(s):  REMOVAL PERITONEAL DIALYSIS CATHETER   TUNNELED DIALYSIS CATHETER INSERTION  Surgeon(s) and Role:     * Garfield Castano MD - Primary         Assistant Staff: Jack Menard  Surgical Staff:  Circ-1: Gem Flores RN  Circ-Relief: Shmuel Elizondo RN  Circ-Intern: Austyn Mcmahon  Scrub RN-1: Reanna Fulton RN  Surg Asst-1: Ashley Lanza  Event Time In   Incision Start 1405   Incision Close 1455     Anesthesia: General   Estimated Blood Loss: min  Specimens:   ID Type Source Tests Collected by Time Destination   1 : peritoneal dialysis catheter Catheter Catheter FUNGUS CULTURE, ROUTINE, CULTURE, ANAEROBIC AND AEROBIC Garfield Castano MD 12/18/2017 1444 Microbiology      Findings: 19cm Palindrome catheter placed; PD catheter removed.    Complications: none  Implants: * No implants in log *

## 2017-12-18 NOTE — PERIOP NOTES
TRANSFER - IN REPORT:    Verbal report received from Samara(name) on Eliezer Glenwood  being received from 3N(unit) for ordered procedure      Report consisted of patients Situation, Background, Assessment and   Recommendations(SBAR). Information from the following report(s) SBAR, Kardex, Procedure Summary, Intake/Output and MAR was reviewed with the receiving nurse. Opportunity for questions and clarification was provided. Assessment completed upon patients arrival to unit and care assumed.

## 2017-12-18 NOTE — ANESTHESIA POSTPROCEDURE EVALUATION
Post-Anesthesia Evaluation and Assessment    Patient: Sourav Belle MRN: [id number]  SSN: [id number]    YOB: 1985  Age: 33 y.o. Sex: female       Cardiovascular Function/Vital Signs  Visit Vitals    /90    Pulse (!) 102    Temp 36.9 °C (98.5 °F)    Resp 15    Ht 5' 5\" (1.651 m)    Wt 63.5 kg (140 lb 1.6 oz)    SpO2 100%    BMI 23.31 kg/m2       Patient is status post general anesthesia for Procedure(s):  REMOVAL PERITONEAL DIALYSIS CATHETER   TUNNELED DIALYSIS CATHETER INSERTION. Nausea/Vomiting: None    Postoperative hydration reviewed and adequate. Pain:  Pain Scale 1: FLACC (12/18/17 1515)  Pain Intensity 1: 8 (12/18/17 1301)   Managed    Neurological Status: At baseline    Mental Status and Level of Consciousness: Arousable    Pulmonary Status:   O2 Device: Nasal cannula (12/18/17 1515)   Adequate oxygenation and airway patent    Complications related to anesthesia: None    Post-anesthesia assessment completed.  No concerns    Signed By: Adriano Skaggs MD     December 18, 2017

## 2017-12-18 NOTE — PROGRESS NOTES
Hospitalist Progress Note      Hospital summary: 33 y.o lady with HTN, SLE w/ lupus nephritis, ESRD on PD, dyslipidemia, VTE, who presented with abdominal pain, initial work-up was consistent with peritonitis. She was recently discharged from here for anasarca due to hypoalbuminemia. Assessment/Plan:  Peritonitis with associated sepsis: (POA) Peritoneal fluid cell count showed WBC of 3769 with 90% neutrophils, gram stain with budding yeast. Peritoneal fluid cultures from last admission on 11/27/17 grew light candida parapsilosis. Tachycardia and leukocytosis on admission. Likely due to infected PD catheter. Sepsis syndrome has resolved. -continue IV fluconazole, received intraperitoneal vancomycin and cefepime  -f/u blood cultures - NGTD  -ID, nephrology, vascular surgery consulted. Plan for PD cath removal today and transition to HD    Abdominal pain: (POA) likely due to peritonitis. Improving. CT of the abd showed hepatic steatosis and possible thickening of the right colon.  Pain was RUQ predominant, an abd US showed hepatic steatosis and gallbladder sludge    Hypomagnesemia, hypokalemia, hypophosphatemia, hypocalcemia:  -monitor and replete prn    SLE:  -continue azathioprine, Plaquenil, low-dose prednisone    ESRD: on PD  -nephrology consulted  -continue cinacalcet    Mild hyponatremia    Severe protein-calorie malnutrition: (POA) based on muscle wasting, loss of subq fat, reduced nutritional intake  -nutrition consulted    HTN:  -continue lower dose carvedilol  -holding amlodipine, losartan    Hyperlipidemia:  -continue gemfibrozil and     History of VTE:  -will hold apixaban over the weekend per vascular surgery    Code status: full  DVT prophylaxis: on apixaban - holding for PD cath removal  Disposition: likely home when medically appropriate  ----------------------------------------------    CC: abdominal pain    S: abd pain stable, no n/v/d, no dyspnea, waiting for PD cath removal today. Review of Systems:  Pertinent items are noted in HPI. O:  Visit Vitals    /74 (BP 1 Location: Right arm, BP Patient Position: At rest)    Pulse 88    Temp 98.6 °F (37 °C)    Resp 18    Ht 5' 5\" (1.651 m)    Wt 63.5 kg (140 lb 1.6 oz)    SpO2 98%    BMI 23.31 kg/m2       PHYSICAL EXAM:  Gen: NAD, chronically-ill appearing  HEENT: anicteric sclerae, normal conjunctiva, MM moist  Neck: supple  Heart: RR, normal rate, no MRG, no JVD, no peripheral edema  Lungs: CTA b/l, non-labored respirations  Abd: soft, mild tenderness in RUQ, PD cath, ND, BS+  Extr: warm  Skin: dry  Neuro: CN II-XII grossly intact, normal speech, moves all extremities  Psych: normal mood, appropriate affect      Intake/Output Summary (Last 24 hours) at 12/18/17 0857  Last data filed at 12/18/17 0806   Gross per 24 hour   Intake             7740 ml   Output            62728 ml   Net            -3660 ml        Recent labs & imaging reviewed:  Recent Labs      12/17/17   0435  12/16/17   0348   WBC  11.0  14.6*   HGB  8.2*  8.0*   HCT  25.6*  23.9*   PLT  457*  432*     Recent Labs      12/17/17   0435  12/16/17   0354   NA  134*  134*   K  3.4*  3.2*   CL  97  98   CO2  27  26   BUN  34*  35*   CREA  6.09*  6.41*   GLU  85  84   CA  6.8*  6.7*   MG  1.6  1.7   PHOS  2.5*  2.4*     No results for input(s): SGOT, GPT, ALT, AP, TBIL, TBILI, TP, ALB, GLOB, GGT, AML, LPSE in the last 72 hours.     No lab exists for component: AMYP, HLPSE    Lab Results   Component Value Date/Time    Folate 3.9 11/06/2017 11:49 AM        Lab Results   Component Value Date/Time    Cholesterol, total 117 09/25/2015 02:02 PM    HDL Cholesterol 31 09/25/2015 02:02 PM    LDL, calculated 57 09/25/2015 02:02 PM    Triglyceride 146 09/25/2015 02:02 PM    CHOL/HDL Ratio 7.7 05/31/2009 10:30 AM     Lab Results   Component Value Date/Time    Glucose (POC) 95 09/16/2015 12:08 PM    Glucose (POC) 116 09/03/2013 06:20 AM    Glucose (POC) 151 09/02/2013 09:07 PM    Glucose (POC) 162 09/02/2013 04:57 PM    Glucose (POC) 133 09/02/2013 11:58 AM    Glucose (POC) 203 09/01/2013 11:02 PM     Lab Results   Component Value Date/Time    Color YELLOW/STRAW 08/12/2016 09:06 PM    Appearance CLEAR 08/12/2016 09:06 PM    Specific gravity 1.017 08/12/2016 09:06 PM    Specific gravity 1.010 07/11/2016 12:44 PM    pH (UA) 7.0 08/12/2016 09:06 PM    Protein 300 08/12/2016 09:06 PM    Glucose NEGATIVE  08/12/2016 09:06 PM    Ketone TRACE 08/12/2016 09:06 PM    Bilirubin NEGATIVE  07/11/2016 12:44 PM    Urobilinogen 1.0 08/12/2016 09:06 PM    Nitrites NEGATIVE  08/12/2016 09:06 PM    Leukocyte Esterase NEGATIVE  08/12/2016 09:06 PM    Epithelial cells MODERATE 08/12/2016 09:06 PM    Bacteria 1+ 08/12/2016 09:06 PM    WBC 0-4 08/12/2016 09:06 PM    RBC 0-5 08/12/2016 09:06 PM       Med list reviewed  Current Facility-Administered Medications   Medication Dose Route Frequency    carvedilol (COREG) tablet 6.25 mg  6.25 mg Oral BID WITH MEALS    allopurinol (ZYLOPRIM) tablet 100 mg  100 mg Oral DAILY    azaTHIOprine (IMURAN) tablet 50 mg  50 mg Oral DAILY AFTER BREAKFAST    hydroxychloroquine (PLAQUENIL) tablet 200 mg  200 mg Oral BID    oxyCODONE IR (ROXICODONE) tablet 5 mg  5 mg Oral Q6H PRN    pantoprazole (PROTONIX) tablet 40 mg  40 mg Oral ACB    senna (SENOKOT) tablet 8.6 mg  1 Tab Oral QHS PRN    HYDROmorphone (DILAUDID) injection 1 mg  1 mg IntraVENous Q4H PRN    ondansetron (ZOFRAN) injection 4 mg  4 mg IntraVENous Q4H PRN    lactobac ac& pc-s.therm-b.anim (AJIT Q/RISAQUAD)  1 Cap Oral DAILY    albuterol (PROVENTIL VENTOLIN) nebulizer solution 2.5 mg  2.5 mg Nebulization Q4H PRN    arformoterol (BROVANA) neb solution 15 mcg  15 mcg Nebulization BID RT    And    budesonide (PULMICORT) 500 mcg/2 ml nebulizer suspension  500 mcg Nebulization BID RT    gemfibrozil (LOPID) tablet 300 mg  300 mg Oral DAILY    predniSONE (DELTASONE) tablet 5 mg  5 mg Oral DAILY WITH BREAKFAST    cinacalcet (SENSIPAR) tablet 30 mg  30 mg Oral DAILY WITH DINNER    fluconazole (DIFLUCAN) 200mg/100 mL IVPB (premix)  200 mg IntraVENous Q24H    peritoneal dialysis DEXTROSE 1.5% (2.5 mEq/L low calcium) solution 2,500 mL  2,500 mL IntraPERitoneal 5XD    acetaminophen (TYLENOL) tablet 650 mg  650 mg Oral Q4H PRN       Care Plan discussed with:  Patient/Family    Colton Kaufman MD  Internal Medicine  Date of Service: 12/18/2017

## 2017-12-18 NOTE — PROGRESS NOTES
Bedside and Verbal shift change report given to [address] [place] [address] [place] (oncoming nurse) by Danny Jameson RN (offgoing nurse). Report included the following information SBAR, Intake/Output, MAR and Recent Results.

## 2017-12-18 NOTE — PERIOP NOTES
Patient: Mitch Keith MRN: [id number]  SSN: [id number]   YOB: 1985  Age: 33 y.o. Sex: female     Patient is status post Procedure(s):  REMOVAL PERITONEAL DIALYSIS CATHETER   TUNNELED DIALYSIS CATHETER INSERTION.     Surgeon(s) and Role:     * Mahamed Martinez MD - Primary    Local/Dose/Irrigation:  n/a                  Peripheral IV 12/14/17 Right Arm (Active)   Site Assessment Clean, dry, & intact 12/18/2017  9:29 AM   Phlebitis Assessment 0 12/18/2017  9:29 AM   Infiltration Assessment 0 12/18/2017  9:29 AM   Dressing Status Clean, dry, & intact 12/18/2017  9:29 AM   Dressing Type Transparent 12/18/2017  9:29 AM   Hub Color/Line Status Infusing 12/18/2017  9:29 AM   Action Taken Open ports on tubing capped 12/17/2017  5:52 PM   Alcohol Cap Used Yes 12/18/2017  4:29 AM       Peripheral IV 12/17/17 Right Arm (Active)   Site Assessment Clean, dry, & intact 12/18/2017  9:29 AM        Hemodialysis Access 12/18/17 (Active)        Airway - Endotracheal Tube 12/18/17 Oral (Active)                   Dressing/Packing:  Wound Chest Right-DRESSING TYPE: 2 x 2;Other (Comment) (island dressing) (12/18/17 1456)  Wound Abdomen Left-DRESSING TYPE: 4 x 4;Special tape (comment) (12/18/17 1456)  Splint/Cast:  ]    Other:  5000 units (5 mL) heparin injected into new dialysis catheter

## 2017-12-18 NOTE — ANESTHESIA PREPROCEDURE EVALUATION
Anesthetic History   No history of anesthetic complications  PONV          Review of Systems / Medical History  Patient summary reviewed, nursing notes reviewed and pertinent labs reviewed    Pulmonary  Within defined limits          Asthma        Neuro/Psych   Within defined limits           Cardiovascular  Within defined limits  Hypertension                   GI/Hepatic/Renal  Within defined limits       Renal disease: ESRD       Endo/Other  Within defined limits      Arthritis     Other Findings              Physical Exam    Airway  Mallampati: II  TM Distance: > 6 cm  Neck ROM: normal range of motion   Mouth opening: Normal     Cardiovascular  Regular rate and rhythm,  S1 and S2 normal,  no murmur, click, rub, or gallop             Dental  No notable dental hx       Pulmonary  Breath sounds clear to auscultation               Abdominal  GI exam deferred       Other Findings            Anesthetic Plan    ASA: 4  Anesthesia type: general          Induction: Intravenous  Anesthetic plan and risks discussed with: Patient

## 2017-12-18 NOTE — PROGRESS NOTES
TRANSFER - IN REPORT:    Verbal report received from nahed acosta(name) on [facility]  being received from PACU(unit) for routine post - op      Report consisted of patients Situation, Background, Assessment and   Recommendations(SBAR). Information from the following report(s) SBAR, Kardex, Procedure Summary, Intake/Output, MAR and Accordion was reviewed with the receiving nurse. Opportunity for questions and clarification was provided. Assessment completed upon patients arrival to unit and care assumed.

## 2017-12-19 ENCOUNTER — APPOINTMENT (OUTPATIENT)
Dept: GENERAL RADIOLOGY | Age: 32
DRG: 981 | End: 2017-12-19
Attending: FAMILY MEDICINE
Payer: MEDICARE

## 2017-12-19 LAB
ANION GAP SERPL CALC-SCNC: 8 MMOL/L (ref 5–15)
BUN SERPL-MCNC: 38 MG/DL (ref 6–20)
BUN/CREAT SERPL: 6 (ref 12–20)
CALCIUM SERPL-MCNC: 6.5 MG/DL (ref 8.5–10.1)
CHLORIDE SERPL-SCNC: 95 MMOL/L (ref 97–108)
CO2 SERPL-SCNC: 28 MMOL/L (ref 21–32)
CREAT SERPL-MCNC: 6.5 MG/DL (ref 0.55–1.02)
ERYTHROCYTE [DISTWIDTH] IN BLOOD BY AUTOMATED COUNT: 16.6 % (ref 11.5–14.5)
GLUCOSE SERPL-MCNC: 84 MG/DL (ref 65–100)
HCT VFR BLD AUTO: 22.6 % (ref 35–47)
HGB BLD-MCNC: 7.3 G/DL (ref 11.5–16)
LACTATE SERPL-SCNC: 0.9 MMOL/L (ref 0.4–2)
MAGNESIUM SERPL-MCNC: 1.6 MG/DL (ref 1.6–2.4)
MCH RBC QN AUTO: 27.8 PG (ref 26–34)
MCHC RBC AUTO-ENTMCNC: 32.3 G/DL (ref 30–36.5)
MCV RBC AUTO: 85.9 FL (ref 80–99)
PHOSPHATE SERPL-MCNC: 3 MG/DL (ref 2.6–4.7)
PLATELET # BLD AUTO: 363 K/UL (ref 150–400)
POTASSIUM SERPL-SCNC: 3.6 MMOL/L (ref 3.5–5.1)
RBC # BLD AUTO: 2.63 M/UL (ref 3.8–5.2)
SODIUM SERPL-SCNC: 131 MMOL/L (ref 136–145)
WBC # BLD AUTO: 7.8 K/UL (ref 3.6–11)

## 2017-12-19 PROCEDURE — 71010 XR CHEST PORT: CPT

## 2017-12-19 PROCEDURE — 74011250637 HC RX REV CODE- 250/637: Performed by: INTERNAL MEDICINE

## 2017-12-19 PROCEDURE — 85027 COMPLETE CBC AUTOMATED: CPT | Performed by: SURGERY

## 2017-12-19 PROCEDURE — 74011250637 HC RX REV CODE- 250/637: Performed by: HOSPITALIST

## 2017-12-19 PROCEDURE — 84100 ASSAY OF PHOSPHORUS: CPT | Performed by: SURGERY

## 2017-12-19 PROCEDURE — 80048 BASIC METABOLIC PNL TOTAL CA: CPT | Performed by: SURGERY

## 2017-12-19 PROCEDURE — 83735 ASSAY OF MAGNESIUM: CPT | Performed by: SURGERY

## 2017-12-19 PROCEDURE — 87040 BLOOD CULTURE FOR BACTERIA: CPT | Performed by: FAMILY MEDICINE

## 2017-12-19 PROCEDURE — 74011000250 HC RX REV CODE- 250: Performed by: INTERNAL MEDICINE

## 2017-12-19 PROCEDURE — 74011636637 HC RX REV CODE- 636/637: Performed by: HOSPITALIST

## 2017-12-19 PROCEDURE — 74011250636 HC RX REV CODE- 250/636: Performed by: INTERNAL MEDICINE

## 2017-12-19 PROCEDURE — 77030027138 HC INCENT SPIROMETER -A

## 2017-12-19 PROCEDURE — 74011250637 HC RX REV CODE- 250/637: Performed by: SURGERY

## 2017-12-19 PROCEDURE — 94640 AIRWAY INHALATION TREATMENT: CPT

## 2017-12-19 PROCEDURE — 36415 COLL VENOUS BLD VENIPUNCTURE: CPT | Performed by: SURGERY

## 2017-12-19 PROCEDURE — 74011250636 HC RX REV CODE- 250/636: Performed by: HOSPITALIST

## 2017-12-19 PROCEDURE — 65210000002 HC RM PRIVATE GYN

## 2017-12-19 PROCEDURE — 83605 ASSAY OF LACTIC ACID: CPT | Performed by: FAMILY MEDICINE

## 2017-12-19 RX ORDER — HEPARIN SODIUM 1000 [USP'U]/ML
1000 INJECTION, SOLUTION INTRAVENOUS; SUBCUTANEOUS
Status: DISCONTINUED | OUTPATIENT
Start: 2017-12-19 | End: 2017-12-22 | Stop reason: HOSPADM

## 2017-12-19 RX ADMIN — ARFORMOTEROL TARTRATE 15 MCG: 15 SOLUTION RESPIRATORY (INHALATION) at 21:13

## 2017-12-19 RX ADMIN — Medication 10 ML: at 14:00

## 2017-12-19 RX ADMIN — Medication 10 ML: at 21:45

## 2017-12-19 RX ADMIN — APIXABAN 5 MG: 5 TABLET, FILM COATED ORAL at 09:27

## 2017-12-19 RX ADMIN — AZATHIOPRINE 50 MG: 50 TABLET ORAL at 12:17

## 2017-12-19 RX ADMIN — CARVEDILOL 6.25 MG: 6.25 TABLET, FILM COATED ORAL at 06:57

## 2017-12-19 RX ADMIN — Medication 10 ML: at 06:58

## 2017-12-19 RX ADMIN — HYDROMORPHONE HYDROCHLORIDE 1 MG: 2 INJECTION INTRAMUSCULAR; INTRAVENOUS; SUBCUTANEOUS at 02:07

## 2017-12-19 RX ADMIN — HYDROXYCHLOROQUINE SULFATE 200 MG: 200 TABLET, FILM COATED ORAL at 09:28

## 2017-12-19 RX ADMIN — PANTOPRAZOLE SODIUM 40 MG: 40 TABLET, DELAYED RELEASE ORAL at 06:57

## 2017-12-19 RX ADMIN — ALLOPURINOL 100 MG: 100 TABLET ORAL at 09:27

## 2017-12-19 RX ADMIN — GEMFIBROZIL 300 MG: 600 TABLET ORAL at 09:26

## 2017-12-19 RX ADMIN — HYDROXYCHLOROQUINE SULFATE 200 MG: 200 TABLET, FILM COATED ORAL at 17:29

## 2017-12-19 RX ADMIN — BUDESONIDE 500 MCG: 0.5 INHALANT RESPIRATORY (INHALATION) at 21:13

## 2017-12-19 RX ADMIN — APIXABAN 5 MG: 5 TABLET, FILM COATED ORAL at 17:33

## 2017-12-19 RX ADMIN — HYDROMORPHONE HYDROCHLORIDE 1 MG: 2 INJECTION INTRAMUSCULAR; INTRAVENOUS; SUBCUTANEOUS at 21:44

## 2017-12-19 RX ADMIN — FLUCONAZOLE 200 MG: 2 INJECTION INTRAVENOUS at 15:53

## 2017-12-19 RX ADMIN — PREDNISONE 5 MG: 5 TABLET ORAL at 06:57

## 2017-12-19 RX ADMIN — Medication 1 CAPSULE: at 09:28

## 2017-12-19 RX ADMIN — HYDROMORPHONE HYDROCHLORIDE 1 MG: 2 INJECTION INTRAMUSCULAR; INTRAVENOUS; SUBCUTANEOUS at 16:23

## 2017-12-19 RX ADMIN — HYDROMORPHONE HYDROCHLORIDE 1 MG: 2 INJECTION INTRAMUSCULAR; INTRAVENOUS; SUBCUTANEOUS at 09:26

## 2017-12-19 NOTE — OP NOTES
[address] [place] [address] REPORT    Yoly Hussein  MR#: [id number]  : 1985  ACCOUNT #: [de-identified]   DATE OF SERVICE: 2017    PREOPERATIVE DIAGNOSES:  End-stage renal disease, infected peritoneal dialysis catheter. POSTOPERATIVE DIAGNOSES:  End-stage renal disease, infected peritoneal dialysis catheter. PROCEDURES:  1. Placement of right chest tunneled dialysis catheter. 2.  Removal of peritoneal dialysis catheter. SURGEON:  Mindy Mckeon MD.      ANESTHESIA:  General.    BLOOD LOSS:  Minimal.    SPECIMEN:  Peritoneal dialysis catheter sent for culture. COMPLICATIONS:  None. INDICATIONS FOR PROCEDURE:  The patient is a 28-year-old female with lupus who is undergoing peritoneal dialysis. She has been found to have fungal peritonitis. We were asked to place tunneled dialysis catheter as well as remove the infected peritoneal dialysis catheter. Risks and benefits of the procedure were discussed preoperatively with the patient. She voiced understanding and wished to proceed. DESCRIPTION OF PROCEDURE:  The patient was placed supine on the operating table and general anesthesia was established. The right neck was prepped and draped in standard surgical fashion as well as the chest.  Using real time ultrasound guidance, the right internal jugular vein was identified and accessed with an 18 gauge needle and a guidewire was placed into the right atrium. Following this, the needle was exchanged for small dilator. A 19 cm Palindrome catheter was then tunneled from an incision in the chest to the neck incision. After dilating the tract, further peel-away sheath with dilator was placed. The dilator was removed and the catheter was then inserted and the peel-away sheath was peeled away. Its location confirmed with fluoroscopy and after this was checked, the catheter was then flushed with heparinized saline.   After this was completed, the catheter was sutured in. Dry sterile dressings were applied and the catheter was capped. After this was completed, the drape was removed. The area of the abdomen was then reprepped and draped where the peritoneal dialysis catheter was exiting the abdomen. This area was again draped in the standard surgical fashion. The prior incision in the abdomen was then opened up using a 10 blade, taken down through skin and subcutaneous tissue. The subcutaneous cuff was then excised using Bovie electrocautery. Further incisions were made down towards the fascia and the peritoneum and the second cuff was dissected out here as well and the catheter was released. The Prolene suture was then cut as well. The catheter was then cut in 2 and both portions were removed. The deepest portion with the pigtail in it was then cut as well and then put into a specimen cup and sent off the table for specimen and culture. As much of the peritoneal fluid as possible was sucked out using a Yankauer sucker. Then, using two figure of eight 0 Vicryl sutures, the fascial defect was then closed along with 2 layers of Vicryl closure in the abdomen. 2-0 and 3-0 Vicryl were used to close the subcutaneous tissue. Dorsal dressings were applied. The patient was then awoken and transferred to the recovery room in stable condition.       Jose Daniel Quintanilla MD AM / Miguel Angel  D: 12/18/2017 15:12     T: 12/19/2017 03:11  JOB #: [id number]

## 2017-12-19 NOTE — PROGRESS NOTES
Dr. Harvey Munoz notified of Pt.s mew score 3 r/t HR of 120. Coreg was held this am for surgery.  Giving the her coreg now and Dr. Harvey Munoz stated to just continue to monitor VS

## 2017-12-19 NOTE — PROGRESS NOTES
Name: Chauncey Baldwin   MRN: [id number]  : 1985      Assessment:  ESRD - PD catheter removed 17-> transition to HD  HTN>>low nl BP now  Peritonitis- fungal infection  Sepsis- d/t peritonitis; improved  Anemia of ESRD  Malnutrition- albumin <2  SLE- with remote hx of Lupus enteritis (before she went on HD). Repeat C3 is mild low; C4 is nl  PE- during recent previous admit  Hypokalemia      Plan/Recommendations:  HD tomorrow  Blood cultures  IV Diflucan  Eliquis resumed  Epogen with HD   Ms. Ivet Villalobos is deciding on where she would like to do outpatient HD (she feels very comfortable with Dr. Ana Lam as her kidney doctor, but lives near [facility]) - she will let us know her decision. Off Losartan/norvasc and Coreg reduced d/t hypotension    Subjective:  Fevers noted post op 100.8-100.9F. BP improving. Ct to have abd pain    ROS:   No nausea, no vomiting  No chest pain, no shortness of breath    Exam:  Visit Vitals    BP 91/65 (BP 1 Location: Left arm, BP Patient Position: At rest)    Pulse 95    Temp 98.9 °F (37.2 °C)    Resp 16    Ht 5' 5\" (1.651 m)    Wt 57.9 kg (127 lb 9.6 oz)    SpO2 99%    BMI 21.23 kg/m2       Gen: NAD  HEENT: No icterus, mmm,  AT/NC  Lungs/Chest wall: Clear. Equal BS. Permacath  Cardiovascular: Regular rate, normal rhythm. Abdomen/: Soft, Tender to palpation,   BS+. Ext: No peripheral edema  CNS: alert and awake.      Current Facility-Administered Medications   Medication Dose Route Frequency Last Dose    sodium chloride (NS) flush 5-10 mL  5-10 mL IntraVENous Q8H 10 mL at 17 1400    sodium chloride (NS) flush 5-10 mL  5-10 mL IntraVENous PRN      lidocaine (PF) (XYLOCAINE) 10 mg/mL (1 %) injection 0.1 mL  0.1 mL SubCUTAneous PRN      fentaNYL citrate (PF) injection 50 mcg  50 mcg IntraVENous PRN 50 mcg at 12/18/17 1313    midazolam (VERSED) injection 1 mg  1 mg IntraVENous PRN      midazolam (VERSED) injection 1 mg  1 mg IntraVENous PRN      apixaban (ELIQUIS) tablet 5 mg  5 mg Oral BID 5 mg at 12/19/17 1733    sodium chloride (NS) flush 5-10 mL  5-10 mL IntraVENous Q8H 10 mL at 12/19/17 1400    sodium chloride (NS) flush 5-10 mL  5-10 mL IntraVENous PRN      lidocaine (PF) (XYLOCAINE) 10 mg/mL (1 %) injection 0.5 mL  0.5 mL SubCUTAneous PRN      fentaNYL citrate (PF) injection 50 mcg  50 mcg IntraVENous PRN      midazolam (VERSED) injection 1 mg  1 mg IntraVENous PRN      midazolam (VERSED) injection 1 mg  1 mg IntraVENous PRN      carvedilol (COREG) tablet 6.25 mg  6.25 mg Oral BID WITH MEALS Stopped at 12/19/17 1729    allopurinol (ZYLOPRIM) tablet 100 mg  100 mg Oral DAILY 100 mg at 12/19/17 0927    azaTHIOprine (IMURAN) tablet 50 mg  50 mg Oral DAILY AFTER BREAKFAST 50 mg at 12/19/17 1217    hydroxychloroquine (PLAQUENIL) tablet 200 mg  200 mg Oral  mg at 12/19/17 1729    oxyCODONE IR (ROXICODONE) tablet 5 mg  5 mg Oral Q6H PRN 5 mg at 12/18/17 2002    pantoprazole (PROTONIX) tablet 40 mg  40 mg Oral ACB 40 mg at 12/19/17 0657    senna (SENOKOT) tablet 8.6 mg  1 Tab Oral QHS PRN      HYDROmorphone (DILAUDID) injection 1 mg  1 mg IntraVENous Q4H PRN 1 mg at 12/19/17 1623    ondansetron (ZOFRAN) injection 4 mg  4 mg IntraVENous Q4H PRN 4 mg at 12/18/17 0445    lactobac ac& pc-s.therm-b.anim (AJIT Q/RISAQUAD)  1 Cap Oral DAILY 1 Cap at 12/19/17 0928    albuterol (PROVENTIL VENTOLIN) nebulizer solution 2.5 mg  2.5 mg Nebulization Q4H PRN      arformoterol (BROVANA) neb solution 15 mcg  15 mcg Nebulization BID RT Stopped at 12/19/17 0915    And    budesonide (PULMICORT) 500 mcg/2 ml nebulizer suspension  500 mcg Nebulization BID RT Stopped at 12/19/17 0915    gemfibrozil (LOPID) tablet 300 mg  300 mg Oral DAILY 300 mg at 12/19/17 0926    predniSONE (DELTASONE) tablet 5 mg  5 mg Oral DAILY WITH BREAKFAST 5 mg at 12/19/17 0657    fluconazole (DIFLUCAN) 200mg/100 mL IVPB (premix)  200 mg IntraVENous Q24H 200 mg at 12/19/17 1553    acetaminophen (TYLENOL) tablet 650 mg  650 mg Oral Q4H PRN         Labs/Data:    Lab Results   Component Value Date/Time    WBC 7.8 12/19/2017 04:40 AM    Hemoglobin (POC) 11.2 09/16/2015 12:08 PM    HGB 7.3 12/19/2017 04:40 AM    Hematocrit (POC) 33 09/16/2015 12:08 PM    HCT 22.6 12/19/2017 04:40 AM    PLATELET 714 89/10/8356 04:40 AM    MCV 85.9 12/19/2017 04:40 AM       Lab Results   Component Value Date/Time    Sodium 131 12/19/2017 04:40 AM    Potassium 3.6 12/19/2017 04:40 AM    Chloride 95 12/19/2017 04:40 AM    CO2 28 12/19/2017 04:40 AM    Anion gap 8 12/19/2017 04:40 AM    Glucose 84 12/19/2017 04:40 AM    BUN 38 12/19/2017 04:40 AM    Creatinine 6.50 12/19/2017 04:40 AM    BUN/Creatinine ratio 6 12/19/2017 04:40 AM    GFR est AA 9 12/19/2017 04:40 AM    GFR est non-AA 7 12/19/2017 04:40 AM    Calcium 6.5 12/19/2017 04:40 AM       Wt Readings from Last 3 Encounters:   12/19/17 57.9 kg (127 lb 9.6 oz)   12/08/17 65.1 kg (143 lb 9.6 oz)   12/08/17 64.9 kg (143 lb)       No intake or output data in the 24 hours ending 12/19/17 1824    Patient seen and examined. Chart reviewed. Labs, data and other pertinent notes reviewed in last 24 hrs.     PMH/SH/FH reviewed and unchanged compared to H&P    Discussed with pt, DANITZA Reddy MD

## 2017-12-19 NOTE — PROGRESS NOTES
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 Repeat C3 is mild low; C4 is nl  PE- during recent previous admit  Hypokalemia      Plan/Recommendations:  Ct CAPD in house; use 1.5% - 5 exchanges per day until tomorrow    S/p IP Vanc + Cefepime on ; cultures negative so far except candida  Ct antifungal    Eliquis on hold -> PD catheter removal and permacath placement tomorrow      Ms. Megan Cooper is deciding on where she would like to do outpatient HD (she feels very comfortable with Dr. Jeremi Cueto as her kidney doctor, but lives near [facility]) - she will let us know her decision. Off Losartan/norvasc and Coreg reduced d/t hypotension    Subjective:  Ct to have mild + abd pain   PD fluid clear    ROS:   No nausea, no vomiting  No chest pain, no shortness of breath    Exam:  Visit Vitals    BP 91/65 (BP 1 Location: Left arm, BP Patient Position: At rest)    Pulse 95    Temp 98.9 °F (37.2 °C)    Resp 16    Ht 5' 5\" (1.651 m)    Wt 57.9 kg (127 lb 9.6 oz)    SpO2 99%    BMI 21.23 kg/m2       Gen: NAD  HEENT: No icterus, mmm,  AT/NC  Lungs/Chest wall: Clear. Equal BS. No respiratory distress  Cardiovascular: Regular rate, normal rhythm. Abdomen/: Soft, Tender to palpation,   BS+. +PD catheter  Ext: No peripheral edema  CNS: alert and awake.      Current Facility-Administered Medications   Medication Dose Route Frequency Last Dose    sodium chloride (NS) flush 5-10 mL  5-10 mL IntraVENous Q8H 10 mL at 17 1400    sodium chloride (NS) flush 5-10 mL  5-10 mL IntraVENous PRN      lidocaine (PF) (XYLOCAINE) 10 mg/mL (1 %) injection 0.1 mL  0.1 mL SubCUTAneous PRN      fentaNYL citrate (PF) injection 50 mcg  50 mcg IntraVENous PRN 50 mcg at 12/18/17 1313    midazolam (VERSED) injection 1 mg  1 mg IntraVENous PRN      midazolam (VERSED) injection 1 mg  1 mg IntraVENous PRN      apixaban (ELIQUIS) tablet 5 mg  5 mg Oral BID 5 mg at 12/19/17 1733    sodium chloride (NS) flush 5-10 mL  5-10 mL IntraVENous Q8H 10 mL at 12/19/17 1400    sodium chloride (NS) flush 5-10 mL  5-10 mL IntraVENous PRN      lidocaine (PF) (XYLOCAINE) 10 mg/mL (1 %) injection 0.5 mL  0.5 mL SubCUTAneous PRN      fentaNYL citrate (PF) injection 50 mcg  50 mcg IntraVENous PRN      midazolam (VERSED) injection 1 mg  1 mg IntraVENous PRN      midazolam (VERSED) injection 1 mg  1 mg IntraVENous PRN      carvedilol (COREG) tablet 6.25 mg  6.25 mg Oral BID WITH MEALS Stopped at 12/19/17 1729    allopurinol (ZYLOPRIM) tablet 100 mg  100 mg Oral DAILY 100 mg at 12/19/17 0927    azaTHIOprine (IMURAN) tablet 50 mg  50 mg Oral DAILY AFTER BREAKFAST 50 mg at 12/19/17 1217    hydroxychloroquine (PLAQUENIL) tablet 200 mg  200 mg Oral  mg at 12/19/17 1729    oxyCODONE IR (ROXICODONE) tablet 5 mg  5 mg Oral Q6H PRN 5 mg at 12/18/17 2002    pantoprazole (PROTONIX) tablet 40 mg  40 mg Oral ACB 40 mg at 12/19/17 0657    senna (SENOKOT) tablet 8.6 mg  1 Tab Oral QHS PRN      HYDROmorphone (DILAUDID) injection 1 mg  1 mg IntraVENous Q4H PRN 1 mg at 12/19/17 1623    ondansetron (ZOFRAN) injection 4 mg  4 mg IntraVENous Q4H PRN 4 mg at 12/18/17 0445    lactobac ac& pc-s.therm-b.anim (AJIT Q/RISAQUAD)  1 Cap Oral DAILY 1 Cap at 12/19/17 0928    albuterol (PROVENTIL VENTOLIN) nebulizer solution 2.5 mg  2.5 mg Nebulization Q4H PRN      arformoterol (BROVANA) neb solution 15 mcg  15 mcg Nebulization BID RT Stopped at 12/19/17 0915    And    budesonide (PULMICORT) 500 mcg/2 ml nebulizer suspension  500 mcg Nebulization BID RT Stopped at 12/19/17 0915    gemfibrozil (LOPID) tablet 300 mg  300 mg Oral DAILY 300 mg at 12/19/17 0926    predniSONE (DELTASONE) tablet 5 mg  5 mg Oral DAILY WITH BREAKFAST 5 mg at 12/19/17 0657    fluconazole (DIFLUCAN) 200mg/100 mL IVPB (premix)  200 mg IntraVENous Q24H 200 mg at 12/19/17 1553    acetaminophen (TYLENOL) tablet 650 mg  650 mg Oral Q4H PRN         Labs/Data:    Lab Results   Component Value Date/Time    WBC 7.8 12/19/2017 04:40 AM    Hemoglobin (POC) 11.2 09/16/2015 12:08 PM    HGB 7.3 12/19/2017 04:40 AM    Hematocrit (POC) 33 09/16/2015 12:08 PM    HCT 22.6 12/19/2017 04:40 AM    PLATELET 512 22/10/5633 04:40 AM    MCV 85.9 12/19/2017 04:40 AM       Lab Results   Component Value Date/Time    Sodium 131 12/19/2017 04:40 AM    Potassium 3.6 12/19/2017 04:40 AM    Chloride 95 12/19/2017 04:40 AM    CO2 28 12/19/2017 04:40 AM    Anion gap 8 12/19/2017 04:40 AM    Glucose 84 12/19/2017 04:40 AM    BUN 38 12/19/2017 04:40 AM    Creatinine 6.50 12/19/2017 04:40 AM    BUN/Creatinine ratio 6 12/19/2017 04:40 AM    GFR est AA 9 12/19/2017 04:40 AM    GFR est non-AA 7 12/19/2017 04:40 AM    Calcium 6.5 12/19/2017 04:40 AM       Wt Readings from Last 3 Encounters:   12/19/17 57.9 kg (127 lb 9.6 oz)   12/08/17 65.1 kg (143 lb 9.6 oz)   12/08/17 64.9 kg (143 lb)       No intake or output data in the 24 hours ending 12/19/17 1819    Patient seen and examined. Chart reviewed. Labs, data and other pertinent notes reviewed in last 24 hrs.     PMH/SH/FH reviewed and unchanged compared to H&P    Discussed with pt, RN      Orlando Warner MD

## 2017-12-19 NOTE — PROGRESS NOTES
Hospitalist Progress Note          Bailey Flores MD  Please call  and page for questions. Call physician on-call through the  7pm-7am    Daily Progress Note: 12/19/2017    Primary care provider:Jay Marshall NP    Date of admission: 12/12/2017  6:46 PM    Admission summery and hospital course:  33 y.o lady with HTN, SLE w/ lupus nephritis, ESRD on PD, dyslipidemia, VTE, who presented with abdominal pain, initial work-up was consistent with peritonitis. She was recently discharged from here for anasarca due to hypoalbuminemia. 12/18: Fever. 12/19: Placement of right chest tunneled dialysis catheter. Removal of peritoneal dialysis catheter. Subjective:   Patient said she/he is feeling better today. Patient said she has little dry cough. Assessment/Plan:   Fever:   Yesterday. Will do blood culture and CXR. Patient does not make urine. Will monitor closely with current medicine. Patient is not immunosuppressant and fever may be due to her SLE as well. .     Peritonitis with associated sepsis: (POA):  Peritoneal fluid cell count showed WBC of 3769 with 90% neutrophils, gram stain with budding yeast. Peritoneal fluid cultures from last admission on 11/27/17 grew light candida parapsilosis. Tachycardia and leukocytosis on admission. Likely due to infected PD catheter. Sepsis syndrome has resolved. -continue IV fluconazole, received intraperitoneal vancomycin and cefepime  -f/u blood cultures - NGTD  -ID, nephrology, vascular surgery consulted. Plan for PD cath removal today and transition to HD     Abdominal pain: (POA) likely due to peritonitis. Improving. CT of the abd showed hepatic steatosis and possible thickening of the right colon.  Pain was RUQ predominant, an abd US showed hepatic steatosis and gallbladder sludge     Hypomagnesemia, hypokalemia, hypophosphatemia, hypocalcemia:  -monitor and replete prn     SLE:  -continue azathioprine, Plaquenil, low-dose prednisone     ESRD: on PD and plan for HD. -nephrology consulted  -continue cinacalcet     Mild hyponatremia     Severe protein-calorie malnutrition: (POA) based on muscle wasting, loss of subq fat, reduced nutritional intake  -nutrition consulted     HTN:  -Now  BP is on the lower side.   -continue lower dose carvedilol with parameter.   -holding amlodipine, losartan     Hyperlipidemia:  -continue gemfibrozil and      History of VTE:  -Hel apixaban over the weekend per vascular surgery, resume on  AM.      See orders for other plans. VTE prophylaxis:   Code status: Full  Discussed plan of care with Patient/Family and Nurse. Pre-admission lived at home. Discharge planning: pending. likely home when medically appropriate       Review of Systems:     Review of Systems:  Symptom  Y/N  Comments   Symptom  Y/N  Comments    Fever/Chills  y    Chest Pain  n    Poor Appetite  y    Edema  n     Cough  y   Abdominal Pain  n     Sputum  n   Joint Pain  n    SOB/IVY  n   Pruritis/Rash  n    Nausea/vomit  n   Tolerating PT/OT      Diarrhea  n   Tolerating Diet      Constipation  n   Other      Could not obtain due to:         Objective:   Physical Exam:     Visit Vitals    BP 98/62 (BP 1 Location: Left arm, BP Patient Position: At rest)    Pulse 96    Temp 99 °F (37.2 °C)    Resp 16    Ht 5' 5\" (1.651 m)    Wt 61.2 kg (134 lb 14.7 oz)    SpO2 93%    BMI 22.45 kg/m2    O2 Flow Rate (L/min): 2 l/min O2 Device: Room air    Temp (24hrs), Av.1 °F (37.3 °C), Min:98 °F (36.7 °C), Max:100.9 °F (38.3 °C)         1901 -  0700  In: 49915 [I.V.:150]  Out: [id number]       General:  Alert, cooperative, no distress, appears stated age. Lungs:   Clear to auscultation bilaterally. Heart:  Regular rate and rhythm, S1, S2 normal, no murmur. Abdomen:   Soft, non-tender. Bowel sounds normal. No masses,  No organomegaly. Extremities: Extremities normal, atraumatic, no cyanosis or edema.    Pulses: 2+ and symmetric all extremities. Skin: Skin color, texture, turgor normal. No rashes or lesions   Neurologic: CNII-XII intact. Data Review:       Recent Days:  Recent Labs      12/19/17   0440  12/17/17 0435   WBC  7.8  11.0   HGB  7.3*  8.2*   HCT  22.6*  25.6*   PLT  363  457*     Recent Labs      12/19/17   0440  12/17/17 0435   NA  131*  134*   K  3.6  3.4*   CL  95*  97   CO2  28  27   GLU  84  85   BUN  38*  34*   CREA  6.50*  6.09*   CA  6.5*  6.8*   MG  1.6  1.6   PHOS  3.0  2.5*     No results for input(s): PH, PCO2, PO2, HCO3, FIO2 in the last 72 hours.     24 Hour Results:  Recent Results (from the past 24 hour(s))   CULTURE, WOUND W GRAM STAIN    Collection Time: 12/18/17  2:55 PM   Result Value Ref Range    Special Requests: CATHETER TIP  LOOK FOR ANAROBES        GRAM STAIN RARE WBCS SEEN      GRAM STAIN 2+ BUDDING YEAST      GRAM STAIN RARE YEAST WITH PSEUDOHYPHAE      Culture result: MODERATE YEAST (A)     METABOLIC PANEL, BASIC    Collection Time: 12/19/17  4:40 AM   Result Value Ref Range    Sodium 131 (L) 136 - 145 mmol/L    Potassium 3.6 3.5 - 5.1 mmol/L    Chloride 95 (L) 97 - 108 mmol/L    CO2 28 21 - 32 mmol/L    Anion gap 8 5 - 15 mmol/L    Glucose 84 65 - 100 mg/dL    BUN 38 (H) 6 - 20 MG/DL    Creatinine 6.50 (H) 0.55 - 1.02 MG/DL    BUN/Creatinine ratio 6 (L) 12 - 20      GFR est AA 9 (L) >60 ml/min/1.73m2    GFR est non-AA 7 (L) >60 ml/min/1.73m2    Calcium 6.5 (L) 8.5 - 10.1 MG/DL   MAGNESIUM    Collection Time: 12/19/17  4:40 AM   Result Value Ref Range    Magnesium 1.6 1.6 - 2.4 mg/dL   PHOSPHORUS    Collection Time: 12/19/17  4:40 AM   Result Value Ref Range    Phosphorus 3.0 2.6 - 4.7 MG/DL   CBC W/O DIFF    Collection Time: 12/19/17  4:40 AM   Result Value Ref Range    WBC 7.8 3.6 - 11.0 K/uL    RBC 2.63 (L) 3.80 - 5.20 M/uL    HGB 7.3 (L) 11.5 - 16.0 g/dL    HCT 22.6 (L) 35.0 - 47.0 %    MCV 85.9 80.0 - 99.0 FL    MCH 27.8 26.0 - 34.0 PG    MCHC 32.3 30.0 - 36.5 g/dL    RDW 16.6 (H) 11.5 - 14.5 %    PLATELET 384 867 - 589 K/uL       Problem List:  Problem List as of 12/19/2017  Date Reviewed: 12/18/2017          Codes Class Noted - Resolved    * (Principal)Sepsis ([facility] ICD-10-CM: A41.9  ICD-9-CM: 038.9, 995.91  12/12/2017 - Present        History of pulmonary embolism ICD-10-CM: A72.567  ICD-9-CM: V12.55  12/8/2017 - Present        Hypomagnesemia ICD-10-CM: E83.42  ICD-9-CM: 275.2  10/30/2017 - Present        Hypocalcemia ICD-10-CM: E83.51  ICD-9-CM: 275.41  10/30/2017 - Present        Hypokalemia ICD-10-CM: E87.6  ICD-9-CM: 276.8  10/27/2017 - Present        Hypertension (Chronic) ICD-10-CM: I10  ICD-9-CM: 401.9  10/14/2017 - Present        Chronic bilateral low back pain without sciatica ICD-10-CM: M54.5, G89.29  ICD-9-CM: 724.2, 338.29  5/26/2017 - Present        Anemia of renal disease ICD-10-CM: D63.1  ICD-9-CM: 285.21  2/21/2017 - Present        Dependence on peritoneal dialysis [facility]) ICD-10-CM: Z99.2  ICD-9-CM: V45.11  2/21/2017 - Present        ACP (advance care planning) ICD-10-CM: Z71.89  ICD-9-CM: V65.49  2/21/2017 - Present        ESRD on peritoneal dialysis [facility]) (Chronic) ICD-10-CM: N18.6, Z99.2  ICD-9-CM: 585.6, V45.11  10/7/2016 - Present        Malignant hypertension ICD-10-CM: I10  ICD-9-CM: 401.0  7/11/2016 - Present        Encounter for monitoring opioid maintenance therapy ICD-10-CM: Z51.81, Z79.891  ICD-9-CM: V58.83, V58.69  11/3/2015 - Present        Lupus nephritis ([facility] [address] ICD-10-CM: M32.14  ICD-9-CM: 710.0, 583.81  3/10/2012 - Present        Lupus ICD-10-CM: L93.0  ICD-9-CM: 695.4  2/27/2012 - Present        RESOLVED: Pneumonia ICD-10-CM: J18.9  ICD-9-CM: 781  10/14/2017 - 12/8/2017        RESOLVED: Pleural effusion, left ICD-10-CM: J90  ICD-9-CM: 511.9  10/14/2017 - 12/8/2017        RESOLVED: Idiopathic chronic inflammatory bowel disease ICD-10-CM: K63.89  ICD-9-CM: 558.9  8/28/2013 - 8/28/2013        RESOLVED: Abdominal pain ICD-10-CM: R10.9  ICD-9-CM: 789.00 8/28/2013 - 9/3/2013        RESOLVED: Hypoxia ICD-10-CM: R09.02  ICD-9-CM: 799.02  4/25/2013 - 4/30/2013        RESOLVED: Lupus nephritis ([facility] ICD-10-CM: M32.14  ICD-9-CM: 710.0, 583.81  4/27/2012 - 4/28/2012              Medications reviewed  Current Facility-Administered Medications   Medication Dose Route Frequency    lactated Ringers infusion  75 mL/hr IntraVENous CONTINUOUS    0.9% sodium chloride infusion  50 mL/hr IntraVENous CONTINUOUS    sodium chloride (NS) flush 5-10 mL  5-10 mL IntraVENous Q8H    sodium chloride (NS) flush 5-10 mL  5-10 mL IntraVENous PRN    lidocaine (PF) (XYLOCAINE) 10 mg/mL (1 %) injection 0.1 mL  0.1 mL SubCUTAneous PRN    fentaNYL citrate (PF) injection 50 mcg  50 mcg IntraVENous PRN    midazolam (VERSED) injection 1 mg  1 mg IntraVENous PRN    midazolam (VERSED) injection 1 mg  1 mg IntraVENous PRN    apixaban (ELIQUIS) tablet 5 mg  5 mg Oral BID    lactated Ringers infusion  125 mL/hr IntraVENous CONTINUOUS    dextrose 5% lactated ringers infusion  125 mL/hr IntraVENous CONTINUOUS    sodium chloride (NS) flush 5-10 mL  5-10 mL IntraVENous Q8H    sodium chloride (NS) flush 5-10 mL  5-10 mL IntraVENous PRN    lidocaine (PF) (XYLOCAINE) 10 mg/mL (1 %) injection 0.5 mL  0.5 mL SubCUTAneous PRN    fentaNYL citrate (PF) injection 50 mcg  50 mcg IntraVENous PRN    midazolam (VERSED) injection 1 mg  1 mg IntraVENous PRN    midazolam (VERSED) injection 1 mg  1 mg IntraVENous PRN    carvedilol (COREG) tablet 6.25 mg  6.25 mg Oral BID WITH MEALS    allopurinol (ZYLOPRIM) tablet 100 mg  100 mg Oral DAILY    azaTHIOprine (IMURAN) tablet 50 mg  50 mg Oral DAILY AFTER BREAKFAST    hydroxychloroquine (PLAQUENIL) tablet 200 mg  200 mg Oral BID    oxyCODONE IR (ROXICODONE) tablet 5 mg  5 mg Oral Q6H PRN    pantoprazole (PROTONIX) tablet 40 mg  40 mg Oral ACB    senna (SENOKOT) tablet 8.6 mg  1 Tab Oral QHS PRN    HYDROmorphone (DILAUDID) injection 1 mg  1 mg IntraVENous Q4H PRN    ondansetron (ZOFRAN) injection 4 mg  4 mg IntraVENous Q4H PRN    lactobac ac& pc-s.therm-b.anim (AJIT Q/RISAQUAD)  1 Cap Oral DAILY    albuterol (PROVENTIL VENTOLIN) nebulizer solution 2.5 mg  2.5 mg Nebulization Q4H PRN    arformoterol (BROVANA) neb solution 15 mcg  15 mcg Nebulization BID RT    And    budesonide (PULMICORT) 500 mcg/2 ml nebulizer suspension  500 mcg Nebulization BID RT    gemfibrozil (LOPID) tablet 300 mg  300 mg Oral DAILY    predniSONE (DELTASONE) tablet 5 mg  5 mg Oral DAILY WITH BREAKFAST    cinacalcet (SENSIPAR) tablet 30 mg  30 mg Oral DAILY WITH DINNER    fluconazole (DIFLUCAN) 200mg/100 mL IVPB (premix)  200 mg IntraVENous Q24H    peritoneal dialysis DEXTROSE 1.5% (2.5 mEq/L low calcium) solution 2,500 mL  2,500 mL IntraPERitoneal 5XD    acetaminophen (TYLENOL) tablet 650 mg  650 mg Oral Q4H PRN       Care Plan discussed with: Patient/Family and Nurse    Total time spent with patient: 30 minutes.     Raji Gamboa MD

## 2017-12-19 NOTE — PROGRESS NOTES
CM reviewed chart and met with pt to introduce self and discuss discharge planning. Pt was doing PD at home, but will likely have to go to Outpatient HD until infection is cleared. Pt confirmed that [facility] was [facility] and she will continue to go there. CM called and spoke with  at [facility] ([phone]) and faxed her facesheet, h&p, chest xray, hep panel, labs, and op report. Will continue to follow.   Joanna Benoit, BSW, ACM

## 2017-12-20 LAB
ALBUMIN SERPL-MCNC: 0.8 G/DL (ref 3.5–5)
ALBUMIN/GLOB SERPL: 0.2 {RATIO} (ref 1.1–2.2)
ALP SERPL-CCNC: 77 U/L (ref 45–117)
ALT SERPL-CCNC: <6 U/L (ref 12–78)
ANION GAP SERPL CALC-SCNC: 11 MMOL/L (ref 5–15)
AST SERPL-CCNC: 21 U/L (ref 15–37)
BASOPHILS # BLD: 0 K/UL (ref 0–0.1)
BASOPHILS NFR BLD: 0 % (ref 0–1)
BILIRUB SERPL-MCNC: 0.3 MG/DL (ref 0.2–1)
BUN SERPL-MCNC: 53 MG/DL (ref 6–20)
BUN/CREAT SERPL: 7 (ref 12–20)
CALCIUM SERPL-MCNC: 6.6 MG/DL (ref 8.5–10.1)
CHLORIDE SERPL-SCNC: 96 MMOL/L (ref 97–108)
CO2 SERPL-SCNC: 25 MMOL/L (ref 21–32)
CREAT SERPL-MCNC: 7.97 MG/DL (ref 0.55–1.02)
DIFFERENTIAL METHOD BLD: ABNORMAL
EOSINOPHIL # BLD: 0.2 K/UL (ref 0–0.4)
EOSINOPHIL NFR BLD: 2 % (ref 0–7)
ERYTHROCYTE [DISTWIDTH] IN BLOOD BY AUTOMATED COUNT: 16.9 % (ref 11.5–14.5)
GLOBULIN SER CALC-MCNC: 4.3 G/DL (ref 2–4)
GLUCOSE SERPL-MCNC: 67 MG/DL (ref 65–100)
HCT VFR BLD AUTO: 22.6 % (ref 35–47)
HGB BLD-MCNC: 7.2 G/DL (ref 11.5–16)
LYMPHOCYTES # BLD: 0.9 K/UL (ref 0.8–3.5)
LYMPHOCYTES NFR BLD: 12 % (ref 12–49)
MAGNESIUM SERPL-MCNC: 1.6 MG/DL (ref 1.6–2.4)
MCH RBC QN AUTO: 27.7 PG (ref 26–34)
MCHC RBC AUTO-ENTMCNC: 31.9 G/DL (ref 30–36.5)
MCV RBC AUTO: 86.9 FL (ref 80–99)
MONOCYTES # BLD: 0.2 K/UL (ref 0–1)
MONOCYTES NFR BLD: 2 % (ref 5–13)
NEUTS SEG # BLD: 6.5 K/UL (ref 1.8–8)
NEUTS SEG NFR BLD: 84 % (ref 32–75)
PHOSPHATE SERPL-MCNC: 4.3 MG/DL (ref 2.6–4.7)
PLATELET # BLD AUTO: 323 K/UL (ref 150–400)
POTASSIUM SERPL-SCNC: 4.1 MMOL/L (ref 3.5–5.1)
PROT SERPL-MCNC: 5.1 G/DL (ref 6.4–8.2)
RBC # BLD AUTO: 2.6 M/UL (ref 3.8–5.2)
RBC MORPH BLD: ABNORMAL
RBC MORPH BLD: ABNORMAL
SODIUM SERPL-SCNC: 132 MMOL/L (ref 136–145)
WBC # BLD AUTO: 7.8 K/UL (ref 3.6–11)

## 2017-12-20 PROCEDURE — 84100 ASSAY OF PHOSPHORUS: CPT | Performed by: FAMILY MEDICINE

## 2017-12-20 PROCEDURE — 83735 ASSAY OF MAGNESIUM: CPT | Performed by: FAMILY MEDICINE

## 2017-12-20 PROCEDURE — 90935 HEMODIALYSIS ONE EVALUATION: CPT

## 2017-12-20 PROCEDURE — 74011636637 HC RX REV CODE- 636/637: Performed by: HOSPITALIST

## 2017-12-20 PROCEDURE — 65210000002 HC RM PRIVATE GYN

## 2017-12-20 PROCEDURE — 74011250636 HC RX REV CODE- 250/636: Performed by: INTERNAL MEDICINE

## 2017-12-20 PROCEDURE — 94640 AIRWAY INHALATION TREATMENT: CPT

## 2017-12-20 PROCEDURE — 36415 COLL VENOUS BLD VENIPUNCTURE: CPT | Performed by: FAMILY MEDICINE

## 2017-12-20 PROCEDURE — 85025 COMPLETE CBC W/AUTO DIFF WBC: CPT | Performed by: FAMILY MEDICINE

## 2017-12-20 PROCEDURE — 80053 COMPREHEN METABOLIC PANEL: CPT | Performed by: FAMILY MEDICINE

## 2017-12-20 PROCEDURE — 74011250637 HC RX REV CODE- 250/637: Performed by: FAMILY MEDICINE

## 2017-12-20 PROCEDURE — 74011250637 HC RX REV CODE- 250/637: Performed by: SURGERY

## 2017-12-20 PROCEDURE — 5A1D70Z PERFORMANCE OF URINARY FILTRATION, INTERMITTENT, LESS THAN 6 HOURS PER DAY: ICD-10-PCS | Performed by: FAMILY MEDICINE

## 2017-12-20 PROCEDURE — 74011250637 HC RX REV CODE- 250/637: Performed by: INTERNAL MEDICINE

## 2017-12-20 PROCEDURE — 74011250637 HC RX REV CODE- 250/637: Performed by: HOSPITALIST

## 2017-12-20 PROCEDURE — 74011250636 HC RX REV CODE- 250/636: Performed by: HOSPITALIST

## 2017-12-20 PROCEDURE — 87449 NOS EACH ORGANISM AG IA: CPT | Performed by: INTERNAL MEDICINE

## 2017-12-20 PROCEDURE — 74011000250 HC RX REV CODE- 250: Performed by: INTERNAL MEDICINE

## 2017-12-20 RX ORDER — HYDROMORPHONE HYDROCHLORIDE 2 MG/1
2 TABLET ORAL
Status: DISCONTINUED | OUTPATIENT
Start: 2017-12-20 | End: 2017-12-22 | Stop reason: HOSPADM

## 2017-12-20 RX ADMIN — HYDROMORPHONE HYDROCHLORIDE 1 MG: 2 INJECTION INTRAMUSCULAR; INTRAVENOUS; SUBCUTANEOUS at 10:03

## 2017-12-20 RX ADMIN — ALLOPURINOL 100 MG: 100 TABLET ORAL at 09:50

## 2017-12-20 RX ADMIN — ARFORMOTEROL TARTRATE 15 MCG: 15 SOLUTION RESPIRATORY (INHALATION) at 20:08

## 2017-12-20 RX ADMIN — CARVEDILOL 6.25 MG: 6.25 TABLET, FILM COATED ORAL at 17:48

## 2017-12-20 RX ADMIN — APIXABAN 5 MG: 5 TABLET, FILM COATED ORAL at 17:49

## 2017-12-20 RX ADMIN — BUDESONIDE 500 MCG: 0.5 INHALANT RESPIRATORY (INHALATION) at 09:15

## 2017-12-20 RX ADMIN — FLUCONAZOLE 200 MG: 2 INJECTION INTRAVENOUS at 18:35

## 2017-12-20 RX ADMIN — HYDROXYCHLOROQUINE SULFATE 200 MG: 200 TABLET, FILM COATED ORAL at 09:50

## 2017-12-20 RX ADMIN — EPOETIN ALFA 10000 UNITS: 10000 SOLUTION INTRAVENOUS; SUBCUTANEOUS at 23:34

## 2017-12-20 RX ADMIN — Medication 10 ML: at 05:03

## 2017-12-20 RX ADMIN — AZATHIOPRINE 50 MG: 50 TABLET ORAL at 09:50

## 2017-12-20 RX ADMIN — CARVEDILOL 6.25 MG: 6.25 TABLET, FILM COATED ORAL at 07:02

## 2017-12-20 RX ADMIN — HYDROXYCHLOROQUINE SULFATE 200 MG: 200 TABLET, FILM COATED ORAL at 17:48

## 2017-12-20 RX ADMIN — GEMFIBROZIL 300 MG: 600 TABLET ORAL at 09:50

## 2017-12-20 RX ADMIN — Medication 1 CAPSULE: at 09:50

## 2017-12-20 RX ADMIN — Medication 10 ML: at 22:51

## 2017-12-20 RX ADMIN — HYDROMORPHONE HYDROCHLORIDE 1 MG: 2 INJECTION INTRAMUSCULAR; INTRAVENOUS; SUBCUTANEOUS at 05:02

## 2017-12-20 RX ADMIN — HYDROMORPHONE HYDROCHLORIDE 2 MG: 2 TABLET ORAL at 21:47

## 2017-12-20 RX ADMIN — APIXABAN 5 MG: 5 TABLET, FILM COATED ORAL at 09:50

## 2017-12-20 RX ADMIN — ONDANSETRON 4 MG: 2 INJECTION INTRAMUSCULAR; INTRAVENOUS at 22:51

## 2017-12-20 RX ADMIN — PANTOPRAZOLE SODIUM 40 MG: 40 TABLET, DELAYED RELEASE ORAL at 07:02

## 2017-12-20 RX ADMIN — BUDESONIDE 500 MCG: 0.5 INHALANT RESPIRATORY (INHALATION) at 20:08

## 2017-12-20 RX ADMIN — PREDNISONE 5 MG: 5 TABLET ORAL at 07:01

## 2017-12-20 RX ADMIN — HYDROMORPHONE HYDROCHLORIDE 2 MG: 2 TABLET ORAL at 16:15

## 2017-12-20 RX ADMIN — ARFORMOTEROL TARTRATE 15 MCG: 15 SOLUTION RESPIRATORY (INHALATION) at 09:14

## 2017-12-20 NOTE — DIALYSIS
[facility]  [phone]    Vitals   Pre   Post   Assessment   Pre   Post     Temp  98.4  98.0 LOC  A&oX 3 and following commands A&Ox 3 and following commands   HR   90 `100 Lungs   Clear Clear   B/P  111/81 114/87 Cardiac   Heart sounds auscultated, pulses palpable Heart sounds auscultated, pulses palpable   Resp   18 16 Skin   Warm, dry and intact Warm, dry and intact   Pain level  0 0 Edema  None noted None noted   Orders:    Duration:   Start:    1213 End:    1513 Total:   3 hours   Dialyzer:   Revaclear   K Bath:   3.5   Ca Bath:   2.5   Na/Bicarb:   140/35   Target Fluid Removal:   1 kg   Access     Type & Location:   [address] CVC. +aspiration/NS flushes. Labs     Obtained/Reviewed   Critical Results Called   Date when labs were drawn-  Hgb-    HGB   Date Value Ref Range Status   12/20/2017 7.2 (L) 11.5 - 16.0 g/dL Final     K-    Potassium   Date Value Ref Range Status   12/20/2017 4.1 3.5 - 5.1 mmol/L Final     Ca-   Calcium   Date Value Ref Range Status   12/20/2017 6.6 (L) 8.5 - 10.1 MG/DL Final     Bun-   BUN   Date Value Ref Range Status   12/20/2017 53 (H) 6 - 20 MG/DL Final     Creat-   Creatinine   Date Value Ref Range Status   12/20/2017 7.97 (H) 0.55 - 1.02 MG/DL Final        Medications/ Blood Products Given     Name   Dose   Route and Time     None ordered                Blood Volume Processed (BVP):    42.0 Net Fluid   Removed:  1 kg   Comments   Time Out Done: Yes: Jacqui Trujillo RN  Primary Nurse Rpt Pre: Rowena Bennett RN  Primary Nurse Rpt Post: Heidi Mcmahon RN  Pt Education: S&S of infection  Care Plan: Continue tx as ordered  Tx Summary: Pt toleratted tx well. At the end blood in circuit returned with 300ml of NS. Clamps secured, sterile caps applied. Dressing/biopatch changed.  Returned to room via transportation,   Admiting Diagnosis:  Pt's previous clinic- N/A  Consent signed -  Yes  Reginald Consent - Yes  Hepatitis Status- Negative on 12/14/17  Machine #- [id number]  Telemetry status- None  Pre-dialysis wt. -  63.5 kg

## 2017-12-20 NOTE — PROGRESS NOTES
Tim Ortega from [employer] [facility] has accepted pt at clinic on a Tuesday Thursday Saturday schedule at 11:15. Pt plans on driving herself to appointments. Since patient is starting on Saturday at outpatient clinic, pt plans to go into the clinic on Friday around 10:30am to complete admission paperwork. CM faxed HD flowsheet to [facility]. Pt is in agreement with discharge plan. CM will continue to follow.   ENRIQUE LyonW, ACM

## 2017-12-20 NOTE — PROGRESS NOTES
NUTRITION COMPLETE ASSESSMENT    RECOMMENDATIONS:   1. ?Bowel regimen  2. Liberalize diet order to Regular, Low Sodium  3. Consider appetite stimulant (severe malnutrition and she does not complain of N/V, but has little appetite to eat)  4. If intake does not improve, may need to consider NGT for nocturnal feedings and allow her to eat during the day  5. Continue daily weights     Interventions/Plan:   Food/Nutrient Delivery: Continue Nepro BID and add snacks    Assessment:   Reason for Assessment:   [x]Reassessment     Diet: Renal regular; B/D: Nepro; PM: greek yogurt  Nutritionally Significant Medications: [x] Reviewed & Includes: risquad; zofran q 4 hours prn; prednisone daily; senna daily prn  Meal Intake: Patient Vitals for the past 100 hrs:   % Diet Eaten   12/17/17 1200 50 %   12/17/17 0800 0 %   12/16/17 1200 50 %   12/16/17 0800 25 %     Subjective:  Pt resting in the bed. Admits that she's not a good breakfast eater, but has been trying to make an effort to drink the supplements. She does these at home. Discussed the risks of malnutrition and the importance of making an effort to eat. Objective:  Chart reviewed, discussed with RN. Pt's intake remains inadequate. She denies N/V/D, but has not had a BM since Monday (chart says 12/15/17). PD catheter removed and tunneled catheter placed for HD. Pt may benefit from nocturnal tube feedings if intake does not improve. Nepro @ 60 mL/hr x 12 hours would provide 1296 kcal, 58 g protein per day-- meeting 84% and 85% of estimated kcal and protein needs, respectively. Current weight is down ~20 kg over the past 2 months; severe muscle/fat wasting. Patient meets criteria for Severe Protein Calorie Malnutrition as evidenced by:   ASPEN Malnutrition Criteria  Energy Intake: <75% est energy req for > 7 days  Body Fat: Moderate  Muscle Mass:  Moderate  ASPEN Malnutrition Score - Acute Illness: 13.      Estimated Nutrition Needs:   Kcals/day: 8860 Kcals/day (4187-9624 kcal/day (MSJ x 1.2-1.3))  Protein: 69 g (69-75g/day (1.2-1.3g/kg))  Fluid: 1600 ml (1mL/kcal)     Based On: [place] Jeor  Weight Used: Actual wt    Pt expected to meet estimated nutrient needs:  []   Yes     [x]  No    Nutrition Diagnosis:   1. Unintended weight loss related to decreased appetite as evidenced by pt with 43 lb (25%) wt loss over past 2 months    2. Increased nutrient needs related to ESRD on PD, poor appetite as evidenced by pt on PD with wt loss, decreased appetite    Goals:     Pt to consume at least 2 supplements per day over the next 5-7 days     Monitoring & Evaluation:    - Total energy intake, Liquid meal replacement   - Weight/weight change, Electrolyte and renal profile     Previous Nutrition Goals Met:  No  Previous Recommendations:      No    Education & Discharge Needs:   [] None Identified   [x] Identified and addressed    [x] Participated in care plan, discharge planning, and/or interdisciplinary rounds        Cultural, Confucianist and ethnic food preferences identified:   None    Skin Integrity: []Intact  [x]Other: incision  Edema: []None [x]Other: trace  Last BM: 12/15/17  Food Allergies: []None [x]Other: fish    Anthropometrics:    Weight Loss Metrics 12/19/2017 12/12/2017 12/8/2017 12/8/2017 12/7/2017 12/4/2017 11/28/2017   Today's Wt 127 lb 9.6 oz - 143 lb 9.6 oz 143 lb 146 lb 161 lb 9.6 oz 177 lb   BMI - 21.23 kg/m2 23.9 kg/m2 23.8 kg/m2 24.3 kg/m2 26.89 kg/m2 29.45 kg/m2      Last 3 Recorded Weights in this Encounter    12/17/17 0326 12/18/17 2239 12/19/17 1256   Weight: 63.5 kg (140 lb 1.6 oz) 61.2 kg (134 lb 14.7 oz) 57.9 kg (127 lb 9.6 oz)      Weight Source: Standing scale (comment)  Height: 5' 5\" (165.1 cm),    Body mass index is 21.23 kg/(m^2).        Labs:  Lab Results   Component Value Date/Time    Sodium 132 12/20/2017 05:07 AM    Potassium 4.1 12/20/2017 05:07 AM    Chloride 96 12/20/2017 05:07 AM    CO2 25 12/20/2017 05:07 AM    Glucose 67 12/20/2017 05:07 AM    BUN 53 12/20/2017 05:07 AM    Creatinine 7.97 12/20/2017 05:07 AM    Calcium 6.6 12/20/2017 05:07 AM    Magnesium 1.6 12/20/2017 05:07 AM    Phosphorus 4.3 12/20/2017 05:07 AM    Albumin 0.8 12/20/2017 05:07 AM     Lab Results   Component Value Date/Time    Hemoglobin A1c 5.4 09/25/2015 02:02 PM     Lab Results   Component Value Date/Time    Glucose 67 12/20/2017 05:07 AM    Glucose (POC) 95 09/16/2015 12:08 PM    Glucose (POC) 116 09/03/2013 06:20 AM      Lab Results   Component Value Date/Time    ALT (SGPT) <6 12/20/2017 05:07 AM    AST (SGOT) 21 12/20/2017 05:07 AM    Alk.  phosphatase 77 12/20/2017 05:07 AM    Bilirubin, direct 0.1 11/27/2017 03:02 AM    Bilirubin, total 0.3 12/20/2017 05:07 AM      [facility] [address]

## 2017-12-20 NOTE — PROGRESS NOTES
Marycruz Molina  is a 33 y. o.female  with a history of SLE, lupus nephritis leading to ESRD /peritoneal dialysis/HTN  Admitted with abdominal pain and inabiltiy to eat  Multiple hospitalizations in the past few months  OCt 14-17 /2017 for left lower lobe pneumonia/effusion- was found to have PE on the rt side  10/27-10/30 possible pneumonia- same infiltrate left lower lobe- sent on augmentin     11/26-/11/28 for b/l leg swelling and abdominal swelling  She had ascitic fluid sent for culture during that admission  But she was discharged before the result came back- and it has candida albicans  SHe is now admitted with abdominal pain, poor appetite  Pt says the pain is more on the rt side, sharp can come with movement- no radiation- lasts a few minutes  She has no appetite- can become nauseous if she eats. She has lost 60 pounds she says since the past few months  She rarely makes urine  No diarrhea  Has a baseline cough of few months- does not bring up any sputum. She has been on peritoneal dialysis since 2015- had cath placed in 2015  LMP 2 yrs ago  Was sexually active in august 2017  HIV [facility] 72 negative in 2015  Last travel was to [place] in July 2017      Subjective  Had PD catheter removed yesterday following which she had a low grade fever which is expected  Feeling better  Pain abdomen slightly better  Objective:   VITALS:    Visit Vitals    /73 (BP 1 Location: Left arm, BP Patient Position: At rest;Head of bed elevated (Comment degrees); Supine)    Pulse 95    Temp 99.3 °F (37.4 °C)    Resp 17    Ht 5' 5\" (1.651 m)    Wt 127 lb 9.6 oz (57.9 kg)    SpO2 97%    BMI 21.23 kg/m2      PHYSICAL EXAM:   General:                    Alert, cooperative, no distress, .   Chronically ill     Lungs:                       B/l air entry  crepts bases     Heart:                                  s1s2  Abdomen:                  Soft- PD catheter out  Tenderness over the abdomen- mild  Extremities: Extremities normal, atraumatic, no cyanosis. No edema.  No clubbing  Skin:                                    Dry skin- striae over arms/abdomen  Lymph:                      Cervical, supraclavicular normal.  Neurologic:                Grossly non-focal     Pertinent Labs  Wbc 19>15.9>12.3  Hb 9.5    Lactate 1  Albumin 0.9  Peritoneal fluid- 3769 WBC ( 90% N)  12/13  Peritoneal fluid-yeast   11/27- candida parapsilosis  IMAGING RESULTS:        Impression/Recommendation  33 yr female with SLE/ lupus nephritis/HTN on peritoneal dialysis  Admitted with abdominal pain     Fungal peritonitis due to candida in a patient with catheter for peritoneal dialysis- on fluconazole  The catheter has been  Removed/ leucocytosis has resolved- will need fluconazole ( can be given PO -200mg every day)  for another 2-3  Weeks ( 1/8/17) and depending on resolution of abdominal pain- may need repeat paracentesis  ( if fluid present) to look for clearance  -  Starting HD  Blood culture neg   Weight loss-   malnutrition  HIv/Hepatitis negative   Quantiferon TB gold- indeterminate- will need to repeat    Chronic cough- not productive- CXR no infiltrate    SLE-on imuran/plaquenil and prednisone     HTN-on losartan     PE- on elaquis     Hypoalbuminemia - from PD and poor nutrition       Discussed with patient and her mother

## 2017-12-20 NOTE — PROGRESS NOTES
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 Repeat C3 is mild low; C4 is nl  PE- during recent previous admit  Hypokalemia      Plan/Recommendations:  HD tomorrow am-> [facility] staffing issues. CM working on HD placement at [address] [place] [address]  F/u Blood cultures  IV Diflucan  Eliquis resumed  Epogen with HD   Off Losartan/norvasc and Coreg reduced d/t hypotension  Encourage protein intake-> on Nepro    Subjective:  Afebrile overnight BP improving. Improved abd pain. Appetite remains fair    ROS:   No nausea, no vomiting  No chest pain, no shortness of breath    Exam:  Visit Vitals    /78 (BP 1 Location: Left arm, BP Patient Position: At rest)    Pulse 93    Temp 98.9 °F (37.2 °C)    Resp 16    Ht 5' 5\" (1.651 m)    Wt 57.9 kg (127 lb 9.6 oz)    SpO2 96%    BMI 21.23 kg/m2       Gen: NAD  HEENT: No icterus, mmm,  AT/NC  Lungs/Chest wall: Clear. Equal BS. Permacath  Cardiovascular: Regular rate, normal rhythm. Abdomen/: Soft,  BS+. Ext: No peripheral edema  CNS: alert and awake.      Current Facility-Administered Medications   Medication Dose Route Frequency Last Dose    HYDROmorphone (DILAUDID) tablet 2 mg  2 mg Oral Q4H PRN      epoetin pia (EPOGEN;PROCRIT) injection 10,000 Units  10,000 Units SubCUTAneous DIALYSIS MON, WED & FRI      heparin (porcine) 1,000 unit/mL injection 1,000 Units  1,000 Units InterCATHeter DIALYSIS PRN      sodium chloride (NS) flush 5-10 mL  5-10 mL IntraVENous Q8H 10 mL at 17 0503    lidocaine (PF) (XYLOCAINE) 10 mg/mL (1 %) injection 0.1 mL 0.1 mL SubCUTAneous PRN      apixaban (ELIQUIS) tablet 5 mg  5 mg Oral BID 5 mg at 12/20/17 0950    sodium chloride (NS) flush 5-10 mL  5-10 mL IntraVENous Q8H 10 mL at 12/20/17 0503    sodium chloride (NS) flush 5-10 mL  5-10 mL IntraVENous PRN      lidocaine (PF) (XYLOCAINE) 10 mg/mL (1 %) injection 0.5 mL  0.5 mL SubCUTAneous PRN      carvedilol (COREG) tablet 6.25 mg  6.25 mg Oral BID WITH MEALS 6.25 mg at 12/20/17 1430    allopurinol (ZYLOPRIM) tablet 100 mg  100 mg Oral DAILY 100 mg at 12/20/17 0950    azaTHIOprine (IMURAN) tablet 50 mg  50 mg Oral DAILY AFTER BREAKFAST 50 mg at 12/20/17 0950    hydroxychloroquine (PLAQUENIL) tablet 200 mg  200 mg Oral  mg at 12/20/17 0950    pantoprazole (PROTONIX) tablet 40 mg  40 mg Oral ACB 40 mg at 12/20/17 9575    senna (SENOKOT) tablet 8.6 mg  1 Tab Oral QHS PRN      ondansetron (ZOFRAN) injection 4 mg  4 mg IntraVENous Q4H PRN 4 mg at 12/18/17 0445    lactobac ac& pc-s.therm-b.anim (AJIT Q/RISAQUAD)  1 Cap Oral DAILY 1 Cap at 12/20/17 0950    albuterol (PROVENTIL VENTOLIN) nebulizer solution 2.5 mg  2.5 mg Nebulization Q4H PRN      arformoterol (BROVANA) neb solution 15 mcg  15 mcg Nebulization BID RT 15 mcg at 12/20/17 3786    And    budesonide (PULMICORT) 500 mcg/2 ml nebulizer suspension  500 mcg Nebulization BID  mcg at 12/20/17 0915    gemfibrozil (LOPID) tablet 300 mg  300 mg Oral DAILY 300 mg at 12/20/17 0950    predniSONE (DELTASONE) tablet 5 mg  5 mg Oral DAILY WITH BREAKFAST 5 mg at 12/20/17 0701    fluconazole (DIFLUCAN) 200mg/100 mL IVPB (premix)  200 mg IntraVENous Q24H 200 mg at 12/19/17 1553    acetaminophen (TYLENOL) tablet 650 mg  650 mg Oral Q4H PRN         Labs/Data:    Lab Results   Component Value Date/Time    WBC 7.8 12/20/2017 05:07 AM    Hemoglobin (POC) 11.2 09/16/2015 12:08 PM    HGB 7.2 12/20/2017 05:07 AM    Hematocrit (POC) 33 09/16/2015 12:08 PM    HCT 22.6 12/20/2017 05:07 AM    PLATELET 603 10/40/4812 05:07 AM    MCV 86.9 12/20/2017 05:07 AM       Lab Results   Component Value Date/Time    Sodium 132 12/20/2017 05:07 AM    Potassium 4.1 12/20/2017 05:07 AM    Chloride 96 12/20/2017 05:07 AM    CO2 25 12/20/2017 05:07 AM    Anion gap 11 12/20/2017 05:07 AM    Glucose 67 12/20/2017 05:07 AM    BUN 53 12/20/2017 05:07 AM    Creatinine 7.97 12/20/2017 05:07 AM    BUN/Creatinine ratio 7 12/20/2017 05:07 AM    GFR est AA 7 12/20/2017 05:07 AM    GFR est non-AA 6 12/20/2017 05:07 AM    Calcium 6.6 12/20/2017 05:07 AM       Wt Readings from Last 3 Encounters:   12/19/17 57.9 kg (127 lb 9.6 oz)   12/08/17 65.1 kg (143 lb 9.6 oz)   12/08/17 64.9 kg (143 lb)       No intake or output data in the 24 hours ending 12/20/17 1203    Patient seen and examined. Chart reviewed. Labs, data and other pertinent notes reviewed in last 24 hrs.     PMH/SH/FH reviewed and unchanged compared to H&P    Discussed with pt,and Dr. Kathrin Sullivan MD

## 2017-12-20 NOTE — PROGRESS NOTES
Hospitalist Progress Note          Jackie Chua MD  Please call  and page for questions. Call physician on-call through the  7pm-7am    Daily Progress Note: 12/20/2017    Primary care provider:Jay Song NP    Date of admission: 12/12/2017  6:46 PM    Admission summery and hospital course:  33 y.o lady with HTN, SLE w/ lupus nephritis, ESRD on PD, dyslipidemia, VTE, who presented with abdominal pain, initial work-up was consistent with peritonitis. She was recently discharged from here for anasarca due to hypoalbuminemia. 12/18: Fever. 12/19: Placement of right chest tunneled dialysis catheter.  Removal of peritoneal dialysis catheter. Subjective:   Patient said she is feeling fine. Assessment/Plan:   Peritonitis with associated sepsis: (POA):  Peritoneal fluid cell count showed WBC of 3769 with 90% neutrophils, gram stain with budding yeast. Peritoneal fluid cultures from last admission on 11/27/17 grew light candida parapsilosis. Tachycardia and leukocytosis on admission. Likely due to infected PD catheter. Sepsis syndrome has resolved. -continue IV fluconazole, received intraperitoneal vancomycin and cefepime  -f/u blood cultures - NGTD  -ID, nephrology, vascular surgery consulted. Plan for PD cath removal today and transition to HD.  -Discussed with Dr Eusebia Russell, HD today and possibly home tomorrow. Fever on 12/18. Resolved. Blood culture negative so far, CXR without any acute pathology. Patient is on mmunosuppressant and fever may be due to her SLE as well. Will change her fluconazole to at discharge.        Abdominal pain: (POA) likely due to peritonitis. Improving. CT of the abd showed hepatic steatosis and possible thickening of the right colon.  Pain was RUQ predominant, an abd US showed hepatic steatosis and gallbladder sludge      Hypomagnesemia, hypokalemia, hypophosphatemia, hypocalcemia:  -monitor and replete prn      SLE:  -continue azathioprine, Plaquenil, low-dose prednisone      ESRD: on PD and plan for HD. -nephrology consulted  -continue cinacalcet      Mild hyponatremia      Severe protein-calorie malnutrition: (POA) based on muscle wasting, loss of subq fat, reduced nutritional intake  -nutrition consulted      HTN:  -Now  BP is on the lower side.   -continue lower dose carvedilol with parameter.   -holding amlodipine, losartan      Hyperlipidemia:  -continue gemfibrozil and       History of VTE:  -Hel apixaban over the weekend per vascular surgery, resume on  AM.     See orders for other plans. VTE prophylaxis: On Apixaban. Code status: Full. Discussed plan of care with Patient/Family and Nurse. Pre-admission lived at home. Discharge planning: possibly tomorrow. Review of Systems:     Review of Systems:  Symptom  Y/N  Comments   Symptom  Y/N  Comments    Fever/Chills   n   Chest Pain  n    Poor Appetite  n    Edema   n    Cough  n   Abdominal Pain  n     Sputum  n   Joint Pain      SOB/IVY  n   Pruritis/Rash      Nausea/vomit     Tolerating PT/OT      Diarrhea     Tolerating Diet      Constipation     Other      Could not obtain due to:         Objective:   Physical Exam:     Visit Vitals    /78 (BP 1 Location: Left arm, BP Patient Position: At rest)    Pulse 93    Temp 98.9 °F (37.2 °C)    Resp 16    Ht 5' 5\" (1.651 m)    Wt 57.9 kg (127 lb 9.6 oz)    SpO2 96%    BMI 21.23 kg/m2    O2 Flow Rate (L/min): 2 l/min O2 Device: Room air    Temp (24hrs), Av °F (37.2 °C), Min:98.1 °F (36.7 °C), Max:99.3 °F (37.4 °C)           General:  Alert, cooperative, no distress, appears stated age. Lungs:   Clear to auscultation bilaterally. Heart:  Regular rate and rhythm, S1, S2 normal, no murmur. Abdomen:   Soft, non-tender. Bowel sounds normal.    Extremities: Extremities normal, atraumatic, no cyanosis or edema. Pulses: 2+ and symmetric all extremities.    Skin: Skin color, texture, turgor normal. No rashes or lesions   Neurologic: CNII-XII intact.         Data Review:       Recent Days:  Recent Labs      12/20/17   0507  12/19/17   0440   WBC  7.8  7.8   HGB  7.2*  7.3*   HCT  22.6*  22.6*   PLT  323  363     Recent Labs      12/20/17   0507  12/19/17   0440   NA  132*  131*   K  4.1  3.6   CL  96*  95*   CO2  25  28   GLU  67  84   BUN  53*  38*   CREA  7.97*  6.50*   CA  6.6*  6.5*   MG  1.6  1.6   PHOS  4.3  3.0   ALB  0.8*   --    SGOT  21   --    ALT  <6*   --      No results for input(s): PH, PCO2, PO2, HCO3, FIO2 in the last 72 hours. 24 Hour Results:  Recent Results (from the past 24 hour(s))   CULTURE, BLOOD, PAIRED    Collection Time: 12/19/17 12:52 PM   Result Value Ref Range    Special Requests: NO SPECIAL REQUESTS      Culture result: NO GROWTH AFTER 16 HOURS     LACTIC ACID    Collection Time: 12/19/17 12:52 PM   Result Value Ref Range    Lactic acid 0.9 0.4 - 2.0 MMOL/L   CBC WITH AUTOMATED DIFF    Collection Time: 12/20/17  5:07 AM   Result Value Ref Range    WBC 7.8 3.6 - 11.0 K/uL    RBC 2.60 (L) 3.80 - 5.20 M/uL    HGB 7.2 (L) 11.5 - 16.0 g/dL    HCT 22.6 (L) 35.0 - 47.0 %    MCV 86.9 80.0 - 99.0 FL    MCH 27.7 26.0 - 34.0 PG    MCHC 31.9 30.0 - 36.5 g/dL    RDW 16.9 (H) 11.5 - 14.5 %    PLATELET 154 633 - 659 K/uL    NEUTROPHILS 84 (H) 32 - 75 %    LYMPHOCYTES 12 12 - 49 %    MONOCYTES 2 (L) 5 - 13 %    EOSINOPHILS 2 0 - 7 %    BASOPHILS 0 0 - 1 %    ABS. NEUTROPHILS 6.5 1.8 - 8.0 K/UL    ABS. LYMPHOCYTES 0.9 0.8 - 3.5 K/UL    ABS. MONOCYTES 0.2 0.0 - 1.0 K/UL    ABS. EOSINOPHILS 0.2 0.0 - 0.4 K/UL    ABS.  BASOPHILS 0.0 0.0 - 0.1 K/UL    DF SMEAR SCANNED      RBC COMMENTS ANISOCYTOSIS  1+        RBC COMMENTS HYPOCHROMIA  1+       METABOLIC PANEL, COMPREHENSIVE    Collection Time: 12/20/17  5:07 AM   Result Value Ref Range    Sodium 132 (L) 136 - 145 mmol/L    Potassium 4.1 3.5 - 5.1 mmol/L    Chloride 96 (L) 97 - 108 mmol/L    CO2 25 21 - 32 mmol/L    Anion gap 11 5 - 15 mmol/L    Glucose 67 65 - 100 mg/dL    BUN 53 (H) 6 - 20 MG/DL    Creatinine 7.97 (H) 0.55 - 1.02 MG/DL    BUN/Creatinine ratio 7 (L) 12 - 20      GFR est AA 7 (L) >60 ml/min/1.73m2    GFR est non-AA 6 (L) >60 ml/min/1.73m2    Calcium 6.6 (L) 8.5 - 10.1 MG/DL    Bilirubin, total 0.3 0.2 - 1.0 MG/DL    ALT (SGPT) <6 (L) 12 - 78 U/L    AST (SGOT) 21 15 - 37 U/L    Alk.  phosphatase 77 45 - 117 U/L    Protein, total 5.1 (L) 6.4 - 8.2 g/dL    Albumin 0.8 (L) 3.5 - 5.0 g/dL    Globulin 4.3 (H) 2.0 - 4.0 g/dL    A-G Ratio 0.2 (L) 1.1 - 2.2     MAGNESIUM    Collection Time: 12/20/17  5:07 AM   Result Value Ref Range    Magnesium 1.6 1.6 - 2.4 mg/dL   PHOSPHORUS    Collection Time: 12/20/17  5:07 AM   Result Value Ref Range    Phosphorus 4.3 2.6 - 4.7 MG/DL       Problem List:  Problem List as of 12/20/2017  Date Reviewed: 12/18/2017          Codes Class Noted - Resolved    * (Principal)Sepsis ([facility] [address] [facility] ICD-10-CM: A41.9  ICD-9-CM: 038.9, 995.91  12/12/2017 - Present        History of pulmonary embolism ICD-10-CM: Q58.290  ICD-9-CM: V12.55  12/8/2017 - Present        Hypomagnesemia ICD-10-CM: E83.42  ICD-9-CM: 275.2  10/30/2017 - Present        Hypocalcemia ICD-10-CM: E83.51  ICD-9-CM: 275.41  10/30/2017 - Present        Hypokalemia ICD-10-CM: E87.6  ICD-9-CM: 276.8  10/27/2017 - Present        Hypertension (Chronic) ICD-10-CM: I10  ICD-9-CM: 401.9  10/14/2017 - Present        Chronic bilateral low back pain without sciatica ICD-10-CM: M54.5, G89.29  ICD-9-CM: 724.2, 338.29  5/26/2017 - Present        Anemia of renal disease ICD-10-CM: D63.1  ICD-9-CM: 285.21  2/21/2017 - Present        Dependence on peritoneal dialysis [facility]) ICD-10-CM: Z99.2  ICD-9-CM: V45.11  2/21/2017 - Present        ACP (advance care planning) ICD-10-CM: Z71.89  ICD-9-CM: V65.49  2/21/2017 - Present        ESRD on peritoneal dialysis [facility]) (Chronic) ICD-10-CM: N18.6, Z99.2  ICD-9-CM: 585.6, V45.11  10/7/2016 - Present        Malignant hypertension ICD-10-CM: I10  ICD-9-CM: 401.0  7/11/2016 - Present        Encounter for monitoring opioid maintenance therapy ICD-10-CM: Z51.81, Z79.891  ICD-9-CM: V58.83, V58.69  11/3/2015 - Present        Lupus nephritis ([facility] ICD-10-CM: M32.14  ICD-9-CM: 710.0, 583.81  3/10/2012 - Present        Lupus ICD-10-CM: L93.0  ICD-9-CM: 695.4  2/27/2012 - Present        RESOLVED: Pneumonia ICD-10-CM: J18.9  ICD-9-CM: 847  10/14/2017 - 12/8/2017        RESOLVED: Pleural effusion, left ICD-10-CM: J90  ICD-9-CM: 511.9  10/14/2017 - 12/8/2017        RESOLVED: Idiopathic chronic inflammatory bowel disease ICD-10-CM: K63.89  ICD-9-CM: 558.9  8/28/2013 - 8/28/2013        RESOLVED: Abdominal pain ICD-10-CM: R10.9  ICD-9-CM: 789.00  8/28/2013 - 9/3/2013        RESOLVED: Hypoxia ICD-10-CM: R09.02  ICD-9-CM: 799.02  4/25/2013 - 4/30/2013        RESOLVED: Lupus nephritis ([facility] ICD-10-CM: M32.14  ICD-9-CM: 710.0, 583.81  4/27/2012 - 4/28/2012              Medications reviewed  Current Facility-Administered Medications   Medication Dose Route Frequency    HYDROmorphone (DILAUDID) tablet 2 mg  2 mg Oral Q4H PRN    epoetin pia (EPOGEN;PROCRIT) injection 10,000 Units  10,000 Units SubCUTAneous DIALYSIS MON, WED & FRI    heparin (porcine) 1,000 unit/mL injection 1,000 Units  1,000 Units InterCATHeter DIALYSIS PRN    sodium chloride (NS) flush 5-10 mL  5-10 mL IntraVENous Q8H    lidocaine (PF) (XYLOCAINE) 10 mg/mL (1 %) injection 0.1 mL  0.1 mL SubCUTAneous PRN    apixaban (ELIQUIS) tablet 5 mg  5 mg Oral BID    sodium chloride (NS) flush 5-10 mL  5-10 mL IntraVENous Q8H    sodium chloride (NS) flush 5-10 mL  5-10 mL IntraVENous PRN    lidocaine (PF) (XYLOCAINE) 10 mg/mL (1 %) injection 0.5 mL  0.5 mL SubCUTAneous PRN    carvedilol (COREG) tablet 6.25 mg  6.25 mg Oral BID WITH MEALS    allopurinol (ZYLOPRIM) tablet 100 mg  100 mg Oral DAILY    azaTHIOprine (IMURAN) tablet 50 mg  50 mg Oral DAILY AFTER BREAKFAST    hydroxychloroquine (PLAQUENIL) tablet 200 mg  200 mg Oral BID    pantoprazole (PROTONIX) tablet 40 mg  40 mg Oral ACB    senna (SENOKOT) tablet 8.6 mg  1 Tab Oral QHS PRN    ondansetron (ZOFRAN) injection 4 mg  4 mg IntraVENous Q4H PRN    lactobac ac& pc-s.therm-b.anim (AJIT Q/RISAQUAD)  1 Cap Oral DAILY    albuterol (PROVENTIL VENTOLIN) nebulizer solution 2.5 mg  2.5 mg Nebulization Q4H PRN    arformoterol (BROVANA) neb solution 15 mcg  15 mcg Nebulization BID RT    And    budesonide (PULMICORT) 500 mcg/2 ml nebulizer suspension  500 mcg Nebulization BID RT    gemfibrozil (LOPID) tablet 300 mg  300 mg Oral DAILY    predniSONE (DELTASONE) tablet 5 mg  5 mg Oral DAILY WITH BREAKFAST    fluconazole (DIFLUCAN) 200mg/100 mL IVPB (premix)  200 mg IntraVENous Q24H    acetaminophen (TYLENOL) tablet 650 mg  650 mg Oral Q4H PRN       Care Plan discussed with: Patient/Family, Nurse,  and Consultant Dr George Vale. Total time spent with patient: 30 minutes.     Farhan Merritt MD

## 2017-12-21 LAB
ALBUMIN SERPL-MCNC: 0.7 G/DL (ref 3.5–5)
ALBUMIN/GLOB SERPL: 0.2 {RATIO} (ref 1.1–2.2)
ALP SERPL-CCNC: 65 U/L (ref 45–117)
ALT SERPL-CCNC: <6 U/L (ref 12–78)
ANION GAP SERPL CALC-SCNC: 8 MMOL/L (ref 5–15)
AST SERPL-CCNC: 25 U/L (ref 15–37)
BACTERIA SPEC CULT: ABNORMAL
BASOPHILS # BLD: 0 K/UL (ref 0–0.1)
BASOPHILS NFR BLD: 0 % (ref 0–1)
BILIRUB SERPL-MCNC: 0.2 MG/DL (ref 0.2–1)
BUN SERPL-MCNC: 30 MG/DL (ref 6–20)
BUN/CREAT SERPL: 6 (ref 12–20)
CALCIUM SERPL-MCNC: 6.4 MG/DL (ref 8.5–10.1)
CHLORIDE SERPL-SCNC: 101 MMOL/L (ref 97–108)
CO2 SERPL-SCNC: 27 MMOL/L (ref 21–32)
CREAT SERPL-MCNC: 4.75 MG/DL (ref 0.55–1.02)
DIFFERENTIAL METHOD BLD: ABNORMAL
EOSINOPHIL # BLD: 0.1 K/UL (ref 0–0.4)
EOSINOPHIL NFR BLD: 2 % (ref 0–7)
ERYTHROCYTE [DISTWIDTH] IN BLOOD BY AUTOMATED COUNT: 16.7 % (ref 11.5–14.5)
GLOBULIN SER CALC-MCNC: 3.9 G/DL (ref 2–4)
GLUCOSE SERPL-MCNC: 78 MG/DL (ref 65–100)
GRAM STN SPEC: ABNORMAL
HCT VFR BLD AUTO: 19.1 % (ref 35–47)
HGB BLD-MCNC: 6.1 G/DL (ref 11.5–16)
LYMPHOCYTES # BLD: 0.9 K/UL (ref 0.8–3.5)
LYMPHOCYTES NFR BLD: 13 % (ref 12–49)
Lab: NORMAL
MCH RBC QN AUTO: 27.2 PG (ref 26–34)
MCHC RBC AUTO-ENTMCNC: 31.9 G/DL (ref 30–36.5)
MCV RBC AUTO: 85.3 FL (ref 80–99)
MONOCYTES # BLD: 0.1 K/UL (ref 0–1)
MONOCYTES NFR BLD: 1 % (ref 5–13)
NEUTS SEG # BLD: 5.6 K/UL (ref 1.8–8)
NEUTS SEG NFR BLD: 84 % (ref 32–75)
PLATELET # BLD AUTO: 310 K/UL (ref 150–400)
POTASSIUM SERPL-SCNC: 3.9 MMOL/L (ref 3.5–5.1)
PROT SERPL-MCNC: 4.6 G/DL (ref 6.4–8.2)
RBC # BLD AUTO: 2.24 M/UL (ref 3.8–5.2)
RBC MORPH BLD: ABNORMAL
REFERENCE LAB,REFLB: NORMAL
SERVICE CMNT-IMP: ABNORMAL
SODIUM SERPL-SCNC: 136 MMOL/L (ref 136–145)
TEST DESCRIPTION:,ATST: NORMAL
WBC # BLD AUTO: 6.7 K/UL (ref 3.6–11)

## 2017-12-21 PROCEDURE — 77030027138 HC INCENT SPIROMETER -A

## 2017-12-21 PROCEDURE — 36415 COLL VENOUS BLD VENIPUNCTURE: CPT | Performed by: FAMILY MEDICINE

## 2017-12-21 PROCEDURE — 85025 COMPLETE CBC W/AUTO DIFF WBC: CPT | Performed by: FAMILY MEDICINE

## 2017-12-21 PROCEDURE — 36430 TRANSFUSION BLD/BLD COMPNT: CPT

## 2017-12-21 PROCEDURE — 74011250637 HC RX REV CODE- 250/637: Performed by: INTERNAL MEDICINE

## 2017-12-21 PROCEDURE — 74011000250 HC RX REV CODE- 250: Performed by: INTERNAL MEDICINE

## 2017-12-21 PROCEDURE — 74011250637 HC RX REV CODE- 250/637: Performed by: HOSPITALIST

## 2017-12-21 PROCEDURE — 74011250637 HC RX REV CODE- 250/637: Performed by: FAMILY MEDICINE

## 2017-12-21 PROCEDURE — 74011250637 HC RX REV CODE- 250/637: Performed by: SURGERY

## 2017-12-21 PROCEDURE — 74011636637 HC RX REV CODE- 636/637: Performed by: HOSPITALIST

## 2017-12-21 PROCEDURE — 94640 AIRWAY INHALATION TREATMENT: CPT

## 2017-12-21 PROCEDURE — 80053 COMPREHEN METABOLIC PANEL: CPT | Performed by: FAMILY MEDICINE

## 2017-12-21 PROCEDURE — 74011250636 HC RX REV CODE- 250/636: Performed by: INTERNAL MEDICINE

## 2017-12-21 PROCEDURE — P9016 RBC LEUKOCYTES REDUCED: HCPCS | Performed by: SURGERY

## 2017-12-21 PROCEDURE — 30233N1 TRANSFUSION OF NONAUTOLOGOUS RED BLOOD CELLS INTO PERIPHERAL VEIN, PERCUTANEOUS APPROACH: ICD-10-PCS | Performed by: SURGERY

## 2017-12-21 PROCEDURE — 74011250636 HC RX REV CODE- 250/636: Performed by: HOSPITALIST

## 2017-12-21 PROCEDURE — 65210000002 HC RM PRIVATE GYN

## 2017-12-21 RX ORDER — CALCIUM CARBONATE 500(1250)
500 TABLET ORAL 2 TIMES DAILY WITH MEALS
Status: DISCONTINUED | OUTPATIENT
Start: 2017-12-21 | End: 2017-12-22 | Stop reason: HOSPADM

## 2017-12-21 RX ORDER — SODIUM CHLORIDE 9 MG/ML
250 INJECTION, SOLUTION INTRAVENOUS AS NEEDED
Status: DISCONTINUED | OUTPATIENT
Start: 2017-12-21 | End: 2017-12-22 | Stop reason: HOSPADM

## 2017-12-21 RX ADMIN — Medication 10 ML: at 16:04

## 2017-12-21 RX ADMIN — ONDANSETRON 4 MG: 2 INJECTION INTRAMUSCULAR; INTRAVENOUS at 17:43

## 2017-12-21 RX ADMIN — PANTOPRAZOLE SODIUM 40 MG: 40 TABLET, DELAYED RELEASE ORAL at 07:44

## 2017-12-21 RX ADMIN — Medication 10 ML: at 16:03

## 2017-12-21 RX ADMIN — APIXABAN 5 MG: 5 TABLET, FILM COATED ORAL at 18:35

## 2017-12-21 RX ADMIN — HYDROMORPHONE HYDROCHLORIDE 2 MG: 2 TABLET ORAL at 10:59

## 2017-12-21 RX ADMIN — Medication 10 ML: at 21:30

## 2017-12-21 RX ADMIN — Medication 10 ML: at 14:00

## 2017-12-21 RX ADMIN — CALCIUM 500 MG: 500 TABLET ORAL at 08:46

## 2017-12-21 RX ADMIN — Medication 10 ML: at 06:00

## 2017-12-21 RX ADMIN — HYDROXYCHLOROQUINE SULFATE 200 MG: 200 TABLET, FILM COATED ORAL at 08:45

## 2017-12-21 RX ADMIN — ALLOPURINOL 100 MG: 100 TABLET ORAL at 08:45

## 2017-12-21 RX ADMIN — FLUCONAZOLE 200 MG: 2 INJECTION INTRAVENOUS at 16:09

## 2017-12-21 RX ADMIN — Medication 1 CAPSULE: at 08:46

## 2017-12-21 RX ADMIN — BUDESONIDE 500 MCG: 0.5 INHALANT RESPIRATORY (INHALATION) at 08:19

## 2017-12-21 RX ADMIN — ARFORMOTEROL TARTRATE 15 MCG: 15 SOLUTION RESPIRATORY (INHALATION) at 20:47

## 2017-12-21 RX ADMIN — AZATHIOPRINE 50 MG: 50 TABLET ORAL at 10:59

## 2017-12-21 RX ADMIN — HYDROXYCHLOROQUINE SULFATE 200 MG: 200 TABLET, FILM COATED ORAL at 18:35

## 2017-12-21 RX ADMIN — ACETAMINOPHEN 650 MG: 325 TABLET, FILM COATED ORAL at 00:49

## 2017-12-21 RX ADMIN — PREDNISONE 5 MG: 5 TABLET ORAL at 07:44

## 2017-12-21 RX ADMIN — Medication 10 ML: at 14:29

## 2017-12-21 RX ADMIN — GEMFIBROZIL 300 MG: 600 TABLET ORAL at 08:39

## 2017-12-21 RX ADMIN — CARVEDILOL 6.25 MG: 6.25 TABLET, FILM COATED ORAL at 07:44

## 2017-12-21 RX ADMIN — APIXABAN 5 MG: 5 TABLET, FILM COATED ORAL at 08:45

## 2017-12-21 RX ADMIN — HYDROMORPHONE HYDROCHLORIDE 2 MG: 2 TABLET ORAL at 18:54

## 2017-12-21 RX ADMIN — ARFORMOTEROL TARTRATE 15 MCG: 15 SOLUTION RESPIRATORY (INHALATION) at 08:19

## 2017-12-21 RX ADMIN — CALCIUM 500 MG: 500 TABLET ORAL at 18:35

## 2017-12-21 RX ADMIN — BUDESONIDE 500 MCG: 0.5 INHALANT RESPIRATORY (INHALATION) at 20:47

## 2017-12-21 NOTE — PROGRESS NOTES
Hospitalist Progress Note          Claude Cousins, MD  Please call  and page for questions. Call physician on-call through the  7pm-7am    Daily Progress Note: 12/21/2017    Primary care provider:Jay Howard NP    Date of admission: 12/12/2017  6:46 PM    Admission summery and hospital course:  33 y.o lady with HTN, SLE w/ lupus nephritis, ESRD on PD, dyslipidemia, VTE, who presented with abdominal pain, initial work-up was consistent with peritonitis. She was recently discharged from here for anasarca due to hypoalbuminemia. 12/18: Fever. 12/19: Placement of right chest tunneled dialysis catheter.  Removal of peritoneal dialysis catheter. 12/21: Bleed transfusion.    Subjective:   Patient said she feels tired. Assessment/Plan:   Anemia:   No evidence of acute bleeding. Will transfuse two units of blood today and monitor. Peritonitis with associated sepsis: (POA):  Peritoneal fluid cell count showed WBC of 3769 with 90% neutrophils, gram stain with budding yeast. Peritoneal fluid cultures from last admission on 11/27/17 grew light candida parapsilosis. Tachycardia and leukocytosis on admission. Likely due to infected PD catheter. Sepsis syndrome has resolved. -continue IV fluconazole, received intraperitoneal vancomycin and cefepime  -f/u blood cultures - NGTD  -ID, nephrology, vascular surgery consulted. Plan for PD cath removal today and transition to HD.  -HF as per Dr Abdelrahman Saab.      Fever on 12/18. Resolved. Blood culture negative so far, CXR without any acute pathology. Patient is on mmunosuppressant and fever may be due to her SLE as well. Will change her fluconazole to at discharge.        Abdominal pain: (POA) likely due to peritonitis. Improving. CT of the abd showed hepatic steatosis and possible thickening of the right colon.  Pain was RUQ predominant, an abd US showed hepatic steatosis and gallbladder sludge      Hypomagnesemia, hypokalemia, hypophosphatemia, hypocalcemia:  -monitor and replete prn      SLE:  -continue azathioprine, Plaquenil, low-dose prednisone      ESRD: on PD and plan for HD. -nephrology consulted  -continue cinacalcet      Mild hyponatremia: resolved      Severe protein-calorie malnutrition: (POA) based on muscle wasting, loss of subq fat, reduced nutritional intake  -nutrition consulted      HTN:  -Now  BP is on the lower side.   -continue lower dose carvedilol with parameter.   -Continue to hold amlodipine, losartan      Hyperlipidemia:  -continue gemfibrozil and       History of VTE:  -Hel apixaban over the weekend per vascular surgery, resume on  AM.      See orders for other plans. VTE prophylaxis: On Apixaban. Code status: Full. Discussed plan of care with Patient/Family and Nurse. Pre-admission lived at home. Discharge planning: possibly to home tomorrow       Review of Systems:     Review of Systems:  Symptom  Y/N  Comments   Symptom  Y/N  Comments    Fever/Chills      Chest Pain      Poor Appetite      Edema       Cough     Abdominal Pain       Sputum     Joint Pain      SOB/IVY     Pruritis/Rash      Nausea/vomit     Tolerating PT/OT      Diarrhea     Tolerating Diet      Constipation     Other      Could not obtain due to:         Objective:   Physical Exam:     Visit Vitals    BP (!) 89/69 (BP 1 Location: Left arm, BP Patient Position: At rest;Sitting)    Pulse 99    Temp 98.7 °F (37.1 °C)    Resp 16    Ht 5' 5\" (1.651 m)    Wt 57.8 kg (127 lb 6.4 oz)    SpO2 100%    BMI 21.2 kg/m2    O2 Flow Rate (L/min): 2 l/min O2 Device: Room air    Temp (24hrs), Av °F (37.2 °C), Min:98 °F (36.7 °C), Max:100.2 °F (37.9 °C)           General:  Alert, cooperative, no distress, appears stated age. Lungs:   Clear to auscultation bilaterally. Heart:  Regular rate and rhythm, S1, S2 normal, no murmur. Abdomen:   Soft, non-tender.  Bowel sounds normal.    Extremities: Extremities normal, atraumatic, no cyanosis or edema. Pulses: 2+ and symmetric all extremities. Skin: Skin color, texture, turgor normal. No rashes or lesions   Neurologic: CNII-XII intact.       Data Review:       Recent Days:  Recent Labs      12/21/17   0516  12/20/17   0507  12/19/17   0440   WBC  6.7  7.8  7.8   HGB  6.1*  7.2*  7.3*   HCT  19.1*  22.6*  22.6*   PLT  310  323  363     Recent Labs      12/21/17   0516  12/20/17   0507  12/19/17   0440   NA  136  132*  131*   K  3.9  4.1  3.6   CL  101  96*  95*   CO2  27  25  28   GLU  78  67  84   BUN  30*  53*  38*   CREA  4.75*  7.97*  6.50*   CA  6.4*  6.6*  6.5*   MG   --   1.6  1.6   PHOS   --   4.3  3.0   ALB  0.7*  0.8*   --    SGOT  25  21   --    ALT  <6*  <6*   --      No results for input(s): PH, PCO2, PO2, HCO3, FIO2 in the last 72 hours. 24 Hour Results:  Recent Results (from the past 24 hour(s))   CBC WITH AUTOMATED DIFF    Collection Time: 12/21/17  5:16 AM   Result Value Ref Range    WBC 6.7 3.6 - 11.0 K/uL    RBC 2.24 (L) 3.80 - 5.20 M/uL    HGB 6.1 (L) 11.5 - 16.0 g/dL    HCT 19.1 (L) 35.0 - 47.0 %    MCV 85.3 80.0 - 99.0 FL    MCH 27.2 26.0 - 34.0 PG    MCHC 31.9 30.0 - 36.5 g/dL    RDW 16.7 (H) 11.5 - 14.5 %    PLATELET 421 178 - 252 K/uL    NEUTROPHILS 84 (H) 32 - 75 %    LYMPHOCYTES 13 12 - 49 %    MONOCYTES 1 (L) 5 - 13 %    EOSINOPHILS 2 0 - 7 %    BASOPHILS 0 0 - 1 %    ABS. NEUTROPHILS 5.6 1.8 - 8.0 K/UL    ABS. LYMPHOCYTES 0.9 0.8 - 3.5 K/UL    ABS. MONOCYTES 0.1 0.0 - 1.0 K/UL    ABS. EOSINOPHILS 0.1 0.0 - 0.4 K/UL    ABS.  BASOPHILS 0.0 0.0 - 0.1 K/UL    DF SMEAR SCANNED      RBC COMMENTS ANISOCYTOSIS  1+       METABOLIC PANEL, COMPREHENSIVE    Collection Time: 12/21/17  5:16 AM   Result Value Ref Range    Sodium 136 136 - 145 mmol/L    Potassium 3.9 3.5 - 5.1 mmol/L    Chloride 101 97 - 108 mmol/L    CO2 27 21 - 32 mmol/L    Anion gap 8 5 - 15 mmol/L    Glucose 78 65 - 100 mg/dL    BUN 30 (H) 6 - 20 MG/DL    Creatinine 4.75 (H) 0.55 - 1.02 MG/DL BUN/Creatinine ratio 6 (L) 12 - 20      GFR est AA 13 (L) >60 ml/min/1.73m2    GFR est non-AA 11 (L) >60 ml/min/1.73m2    Calcium 6.4 (LL) 8.5 - 10.1 MG/DL    Bilirubin, total 0.2 0.2 - 1.0 MG/DL    ALT (SGPT) <6 (L) 12 - 78 U/L    AST (SGOT) 25 15 - 37 U/L    Alk.  phosphatase 65 45 - 117 U/L    Protein, total 4.6 (L) 6.4 - 8.2 g/dL    Albumin 0.7 (L) 3.5 - 5.0 g/dL    Globulin 3.9 2.0 - 4.0 g/dL    A-G Ratio 0.2 (L) 1.1 - 2.2         Problem List:  Problem List as of 12/21/2017  Date Reviewed: 12/18/2017          Codes Class Noted - Resolved    * (Principal)Sepsis ([facility] ICD-10-CM: A41.9  ICD-9-CM: 038.9, 995.91  12/12/2017 - Present        History of pulmonary embolism ICD-10-CM: C00.407  ICD-9-CM: V12.55  12/8/2017 - Present        Hypomagnesemia ICD-10-CM: E83.42  ICD-9-CM: 275.2  10/30/2017 - Present        Hypocalcemia ICD-10-CM: E83.51  ICD-9-CM: 275.41  10/30/2017 - Present        Hypokalemia ICD-10-CM: E87.6  ICD-9-CM: 276.8  10/27/2017 - Present        Hypertension (Chronic) ICD-10-CM: I10  ICD-9-CM: 401.9  10/14/2017 - Present        Chronic bilateral low back pain without sciatica ICD-10-CM: M54.5, G89.29  ICD-9-CM: 724.2, 338.29  5/26/2017 - Present        Anemia of renal disease ICD-10-CM: D63.1  ICD-9-CM: 285.21  2/21/2017 - Present        Dependence on peritoneal dialysis [facility]) ICD-10-CM: Z99.2  ICD-9-CM: V45.11  2/21/2017 - Present        ACP (advance care planning) ICD-10-CM: Z71.89  ICD-9-CM: V65.49  2/21/2017 - Present        ESRD on peritoneal dialysis [facility]) (Chronic) ICD-10-CM: N18.6, Z99.2  ICD-9-CM: 585.6, V45.11  10/7/2016 - Present        Malignant hypertension ICD-10-CM: I10  ICD-9-CM: 401.0  7/11/2016 - Present        Encounter for monitoring opioid maintenance therapy ICD-10-CM: Z51.81, Z79.891  ICD-9-CM: V58.83, V58.69  11/3/2015 - Present        Lupus nephritis ([facility] [address] ICD-10-CM: M32.14  ICD-9-CM: 710.0, 583.81  3/10/2012 - Present        Lupus ICD-10-CM: L93.0  ICD-9-CM: 695.4 2/27/2012 - Present        RESOLVED: Pneumonia ICD-10-CM: J18.9  ICD-9-CM: 090  10/14/2017 - 12/8/2017        RESOLVED: Pleural effusion, left ICD-10-CM: J90  ICD-9-CM: 511.9  10/14/2017 - 12/8/2017        RESOLVED: Idiopathic chronic inflammatory bowel disease ICD-10-CM: K63.89  ICD-9-CM: 558.9  8/28/2013 - 8/28/2013        RESOLVED: Abdominal pain ICD-10-CM: R10.9  ICD-9-CM: 789.00  8/28/2013 - 9/3/2013        RESOLVED: Hypoxia ICD-10-CM: R09.02  ICD-9-CM: 799.02  4/25/2013 - 4/30/2013        RESOLVED: Lupus nephritis ([facility] [address] ICD-10-CM: M32.14  ICD-9-CM: 710.0, 583.81  4/27/2012 - 4/28/2012              Medications reviewed  Current Facility-Administered Medications   Medication Dose Route Frequency    calcium carbonate (OS-REGGIE) tablet 500 mg [elemental]  500 mg Oral BID WITH MEALS    0.9% sodium chloride infusion 250 mL  250 mL IntraVENous PRN    HYDROmorphone (DILAUDID) tablet 2 mg  2 mg Oral Q4H PRN    epoetin pia (EPOGEN;PROCRIT) injection 10,000 Units  10,000 Units SubCUTAneous DIALYSIS MON, WED & FRI    heparin (porcine) 1,000 unit/mL injection 1,000 Units  1,000 Units InterCATHeter DIALYSIS PRN    sodium chloride (NS) flush 5-10 mL  5-10 mL IntraVENous Q8H    lidocaine (PF) (XYLOCAINE) 10 mg/mL (1 %) injection 0.1 mL  0.1 mL SubCUTAneous PRN    apixaban (ELIQUIS) tablet 5 mg  5 mg Oral BID    sodium chloride (NS) flush 5-10 mL  5-10 mL IntraVENous Q8H    sodium chloride (NS) flush 5-10 mL  5-10 mL IntraVENous PRN    lidocaine (PF) (XYLOCAINE) 10 mg/mL (1 %) injection 0.5 mL  0.5 mL SubCUTAneous PRN    carvedilol (COREG) tablet 6.25 mg  6.25 mg Oral BID WITH MEALS    allopurinol (ZYLOPRIM) tablet 100 mg  100 mg Oral DAILY    azaTHIOprine (IMURAN) tablet 50 mg  50 mg Oral DAILY AFTER BREAKFAST    hydroxychloroquine (PLAQUENIL) tablet 200 mg  200 mg Oral BID    pantoprazole (PROTONIX) tablet 40 mg  40 mg Oral ACB    senna (SENOKOT) tablet 8.6 mg  1 Tab Oral QHS PRN    ondansetron (ZOFRAN) injection 4 mg  4 mg IntraVENous Q4H PRN    lactobac ac& pc-s.therm-b.anim (AJIT Q/RISAQUAD)  1 Cap Oral DAILY    albuterol (PROVENTIL VENTOLIN) nebulizer solution 2.5 mg  2.5 mg Nebulization Q4H PRN    arformoterol (BROVANA) neb solution 15 mcg  15 mcg Nebulization BID RT    And    budesonide (PULMICORT) 500 mcg/2 ml nebulizer suspension  500 mcg Nebulization BID RT    gemfibrozil (LOPID) tablet 300 mg  300 mg Oral DAILY    predniSONE (DELTASONE) tablet 5 mg  5 mg Oral DAILY WITH BREAKFAST    fluconazole (DIFLUCAN) 200mg/100 mL IVPB (premix)  200 mg IntraVENous Q24H    acetaminophen (TYLENOL) tablet 650 mg  650 mg Oral Q4H PRN       Care Plan discussed with: Patient/Family, Nurse and     Total time spent with patient: 40 minutes.     Poncho Peña MD

## 2017-12-21 NOTE — PROGRESS NOTES
Bedside shift change report given to Asher Desir (oncoming nurse) by Deepa Stephens (offgoing nurse). Report included the following information SBAR, Kardex, Procedure Summary, Intake/Output, MAR and Accordion.

## 2017-12-21 NOTE — PROGRESS NOTES
Rec'd critical lab result for morning Ca draw. Notified Dr. Stephens  who is on call.  He requested I notify the Nephrology MD. I contacted Nephrology Specialist and spoke with Dr. Ailyn Hardy who states she will notify the rounding MD.

## 2017-12-21 NOTE — PROGRESS NOTES
Bedside and Verbal shift change report given to Aakash Walekr RN (oncoming nurse) by Herberth Tang RN (offgoing nurse). Report included the following information SBAR, Kardex, Procedure Summary, Intake/Output, MAR, Accordion and Recent Results.

## 2017-12-21 NOTE — PROGRESS NOTES
Derrell Craft  is a 33 y. o.female  with a history of SLE, lupus nephritis leading to ESRD /peritoneal dialysis/HTN  Admitted with abdominal pain and inabiltiy to eat  Multiple hospitalizations in the past few months  OCt 14-17 /2017 for left lower lobe pneumonia/effusion- was found to have PE on the rt side  10/27-10/30 possible pneumonia- same infiltrate left lower lobe- sent on augmentin     11/26-/11/28 for b/l leg swelling and abdominal swelling  She had ascitic fluid sent for culture during that admission  But she was discharged before the result came back- and it has candida albicans  SHe is now admitted with abdominal pain, poor appetite  Pt says the pain is more on the rt side, sharp can come with movement- no radiation- lasts a few minutes  She has no appetite- can become nauseous if she eats. She has lost 60 pounds she says since the past few months  She rarely makes urine  No diarrhea  Has a baseline cough of few months- does not bring up any sputum.   She has been on peritoneal dialysis since 2015- had cath placed in 2015  LMP 2 yrs ago  Was sexually active in august 2017  HIV [facility] 72 negative in 2015  Last travel was to [place] in July 2017       Objective:   VITALS:    Visit Vitals    /76 (BP 1 Location: Left arm, BP Patient Position: At rest)    Pulse 92    Temp 99.2 °F (37.3 °C)    Resp 16    Ht 5' 5\" (1.651 m)    Wt 127 lb 6.4 oz (57.8 kg)    SpO2 97%    BMI 21.2 kg/m2         Pertinent Labs  Wbc 19>15.9>12.3>6.8  Hb 9.5    Lactate 1  Albumin 0.9  Peritoneal fluid- 3769 WBC ( 90% N)  12/13  Peritoneal fluid-yeast   11/27- candida parapsilosis  IMAGING RESULTS:        Impression/Recommendation  33 yr female with SLE/ lupus nephritis/HTN on peritoneal dialysis  Admitted with abdominal pain     Fungal peritonitis due to candida in a patient with catheter for peritoneal dialysis- on fluconazole  The catheter has been  Removed/ leucocytosis has resolved- will need fluconazole ( can be given PO -200mg every day)  for another 2-3  Weeks ( 1/8/17) and depending on resolution of abdominal pain- may need repeat paracentesis  ( if fluid present) to look for clearance  -  Starting HD  Blood culture neg   Weight loss-   malnutrition  HIv/Hepatitis negative   Quantiferon TB gold- indeterminate- will need to repeat    Chronic cough- not productive- CXR no infiltrate    SLE-on imuran/plaquenil and prednisone     HTN-on losartan     PE- on elaquis     Hypoalbuminemia - from PD and poor nutrition  I am away for the next three days and can be reached on cell [phone]

## 2017-12-21 NOTE — PROGRESS NOTES
Name: Duane Carpenter   MRN: [id number]  : 1985      Assessment:  ESRD - PD catheter removed 17-> transition to HD yesterday  HTN>>low nl BP now  Peritonitis- fungal infection. 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 Repeat C3 is mild low; C4 is nl  PE- during recent previous admit        Plan/Recommendations:  PRBC x1 unit only (might be too much volume for her to get 2units off HD)  HD [facility] at [facility]  IV Diflucan  Eliquis resumed  Epogen with HD   Off Losartan/norvasc and Coreg reduced d/t hypotension  Encourage protein intake-> on Nepro  Postponed discharge until tomorrow    Subjective:  Improved abd pain. Appetite remains fair    ROS:   No nausea, no vomiting  No chest pain, no shortness of breath    Exam:  Visit Vitals    /76 (BP 1 Location: Left arm, BP Patient Position: At rest)    Pulse 92    Temp 99.2 °F (37.3 °C)    Resp 16    Ht 5' 5\" (1.651 m)    Wt 57.8 kg (127 lb 6.4 oz)    SpO2 97%    BMI 21.2 kg/m2       Gen: NAD  HEENT: No icterus, mmm,  AT/NC  Lungs/Chest wall: Clear. Equal BS. Permacath  Cardiovascular: Regular rate, normal rhythm. Abdomen/: Soft,  BS+. Ext: No peripheral edema  CNS: alert and awake.      Current Facility-Administered Medications   Medication Dose Route Frequency Last Dose    calcium carbonate (OS-REGGIE) tablet 500 mg [elemental]  500 mg Oral BID WITH MEALS 500 mg at 17 0846    0.9% sodium chloride infusion 250 mL  250 mL IntraVENous PRN      HYDROmorphone (DILAUDID) tablet 2 mg  2 mg Oral Q4H PRN 2 mg at 17 1059    epoetin pia (EPOGEN;PROCRIT) injection 10,000 Units  10,000 Units SubCUTAneous DIALYSIS MON, WED & FRI 10,000 Units at 17 2334    heparin (porcine) 1,000 unit/mL injection 1,000 Units  1,000 Units InterCATHeter DIALYSIS PRN      sodium chloride (NS) flush 5-10 mL  5-10 mL IntraVENous Q8H 10 mL at 12/21/17 0600    lidocaine (PF) (XYLOCAINE) 10 mg/mL (1 %) injection 0.1 mL  0.1 mL SubCUTAneous PRN      apixaban (ELIQUIS) tablet 5 mg  5 mg Oral BID 5 mg at 12/21/17 0845    sodium chloride (NS) flush 5-10 mL  5-10 mL IntraVENous Q8H 10 mL at 12/21/17 0600    sodium chloride (NS) flush 5-10 mL  5-10 mL IntraVENous PRN      lidocaine (PF) (XYLOCAINE) 10 mg/mL (1 %) injection 0.5 mL  0.5 mL SubCUTAneous PRN      carvedilol (COREG) tablet 6.25 mg  6.25 mg Oral BID WITH MEALS 6.25 mg at 12/21/17 0744    allopurinol (ZYLOPRIM) tablet 100 mg  100 mg Oral DAILY 100 mg at 12/21/17 0845    azaTHIOprine (IMURAN) tablet 50 mg  50 mg Oral DAILY AFTER BREAKFAST 50 mg at 12/21/17 1059    hydroxychloroquine (PLAQUENIL) tablet 200 mg  200 mg Oral  mg at 12/21/17 0845    pantoprazole (PROTONIX) tablet 40 mg  40 mg Oral ACB 40 mg at 12/21/17 0744    senna (SENOKOT) tablet 8.6 mg  1 Tab Oral QHS PRN      ondansetron (ZOFRAN) injection 4 mg  4 mg IntraVENous Q4H PRN 4 mg at 12/20/17 6981    lactobac ac& pc-s.therm-b.anim (AJIT Q/RISAQUAD)  1 Cap Oral DAILY 1 Cap at 12/21/17 0846    albuterol (PROVENTIL VENTOLIN) nebulizer solution 2.5 mg  2.5 mg Nebulization Q4H PRN      arformoterol (BROVANA) neb solution 15 mcg  15 mcg Nebulization BID RT 15 mcg at 12/21/17 0819    And    budesonide (PULMICORT) 500 mcg/2 ml nebulizer suspension  500 mcg Nebulization BID  mcg at 12/21/17 0819    gemfibrozil (LOPID) tablet 300 mg  300 mg Oral DAILY 300 mg at 12/21/17 0839    predniSONE (DELTASONE) tablet 5 mg  5 mg Oral DAILY WITH BREAKFAST 5 mg at 12/21/17 0744    fluconazole (DIFLUCAN) 200mg/100 mL IVPB (premix)  200 mg IntraVENous Q24H 200 mg at 12/20/17 1832    acetaminophen (TYLENOL) tablet 650 mg  650 mg Oral Q4H  mg at 12/21/17 0049       Labs/Data:    Lab Results   Component Value Date/Time    WBC 6.7 12/21/2017 05:16 AM    Hemoglobin (POC) 11.2 09/16/2015 12:08 PM    HGB 6.1 12/21/2017 05:16 AM    Hematocrit (POC) 33 09/16/2015 12:08 PM    HCT 19.1 12/21/2017 05:16 AM    PLATELET 007 43/22/9870 05:16 AM    MCV 85.3 12/21/2017 05:16 AM       Lab Results   Component Value Date/Time    Sodium 136 12/21/2017 05:16 AM    Potassium 3.9 12/21/2017 05:16 AM    Chloride 101 12/21/2017 05:16 AM    CO2 27 12/21/2017 05:16 AM    Anion gap 8 12/21/2017 05:16 AM    Glucose 78 12/21/2017 05:16 AM    BUN 30 12/21/2017 05:16 AM    Creatinine 4.75 12/21/2017 05:16 AM    BUN/Creatinine ratio 6 12/21/2017 05:16 AM    GFR est AA 13 12/21/2017 05:16 AM    GFR est non-AA 11 12/21/2017 05:16 AM    Calcium 6.4 12/21/2017 05:16 AM       Wt Readings from Last 3 Encounters:   12/20/17 57.8 kg (127 lb 6.4 oz)   12/08/17 65.1 kg (143 lb 9.6 oz)   12/08/17 64.9 kg (143 lb)       No intake or output data in the 24 hours ending 12/21/17 1256    Patient seen and examined. Chart reviewed. Labs, data and other pertinent notes reviewed in last 24 hrs.     PMH/SH/FH reviewed and unchanged compared to H&P    Discussed with pt,and RN      Nella Julio MD

## 2017-12-22 VITALS
WEIGHT: 130.6 LBS | HEART RATE: 92 BPM | SYSTOLIC BLOOD PRESSURE: 117 MMHG | HEIGHT: 65 IN | OXYGEN SATURATION: 99 % | DIASTOLIC BLOOD PRESSURE: 90 MMHG | TEMPERATURE: 99.4 F | RESPIRATION RATE: 16 BRPM | BODY MASS INDEX: 21.76 KG/M2

## 2017-12-22 PROBLEM — A41.9 SEPSIS (HCC): Status: RESOLVED | Noted: 2017-12-12 | Resolved: 2017-12-22

## 2017-12-22 LAB
ABO + RH BLD: NORMAL
ALBUMIN SERPL-MCNC: 0.7 G/DL (ref 3.5–5)
ALBUMIN/GLOB SERPL: 0.2 {RATIO} (ref 1.1–2.2)
ALP SERPL-CCNC: 66 U/L (ref 45–117)
ALT SERPL-CCNC: <6 U/L (ref 12–78)
ANION GAP SERPL CALC-SCNC: 10 MMOL/L (ref 5–15)
AST SERPL-CCNC: 27 U/L (ref 15–37)
BASOPHILS # BLD: 0 K/UL (ref 0–0.1)
BASOPHILS NFR BLD: 0 % (ref 0–1)
BILIRUB SERPL-MCNC: 0.2 MG/DL (ref 0.2–1)
BLD PROD TYP BPU: NORMAL
BLD PROD TYP BPU: NORMAL
BLOOD GROUP ANTIBODIES SERPL: NORMAL
BPU ID: NORMAL
BPU ID: NORMAL
BUN SERPL-MCNC: 37 MG/DL (ref 6–20)
BUN/CREAT SERPL: 6 (ref 12–20)
CALCIUM SERPL-MCNC: 6.8 MG/DL (ref 8.5–10.1)
CHLORIDE SERPL-SCNC: 100 MMOL/L (ref 97–108)
CO2 SERPL-SCNC: 26 MMOL/L (ref 21–32)
CREAT SERPL-MCNC: 5.91 MG/DL (ref 0.55–1.02)
CROSSMATCH RESULT,%XM: NORMAL
CROSSMATCH RESULT,%XM: NORMAL
EOSINOPHIL # BLD: 0.1 K/UL (ref 0–0.4)
EOSINOPHIL NFR BLD: 2 % (ref 0–7)
ERYTHROCYTE [DISTWIDTH] IN BLOOD BY AUTOMATED COUNT: 16.1 % (ref 11.5–14.5)
GLOBULIN SER CALC-MCNC: 4 G/DL (ref 2–4)
GLUCOSE SERPL-MCNC: 69 MG/DL (ref 65–100)
HCT VFR BLD AUTO: 23.9 % (ref 35–47)
HGB BLD-MCNC: 7.9 G/DL (ref 11.5–16)
LYMPHOCYTES # BLD: 0.9 K/UL (ref 0.8–3.5)
LYMPHOCYTES NFR BLD: 14 % (ref 12–49)
MAGNESIUM SERPL-MCNC: 1.9 MG/DL (ref 1.6–2.4)
MCH RBC QN AUTO: 28.4 PG (ref 26–34)
MCHC RBC AUTO-ENTMCNC: 33.1 G/DL (ref 30–36.5)
MCV RBC AUTO: 86 FL (ref 80–99)
MONOCYTES # BLD: 0.1 K/UL (ref 0–1)
MONOCYTES NFR BLD: 1 % (ref 5–13)
NEUTS SEG # BLD: 5.5 K/UL (ref 1.8–8)
NEUTS SEG NFR BLD: 83 % (ref 32–75)
PHOSPHATE SERPL-MCNC: 2.8 MG/DL (ref 2.6–4.7)
PLATELET # BLD AUTO: 283 K/UL (ref 150–400)
POTASSIUM SERPL-SCNC: 4.1 MMOL/L (ref 3.5–5.1)
PROT SERPL-MCNC: 4.7 G/DL (ref 6.4–8.2)
RBC # BLD AUTO: 2.78 M/UL (ref 3.8–5.2)
SODIUM SERPL-SCNC: 136 MMOL/L (ref 136–145)
SPECIMEN EXP DATE BLD: NORMAL
STATUS OF UNIT,%ST: NORMAL
STATUS OF UNIT,%ST: NORMAL
UNIT DIVISION, %UDIV: 0
UNIT DIVISION, %UDIV: 0
WBC # BLD AUTO: 6.6 K/UL (ref 3.6–11)

## 2017-12-22 PROCEDURE — 74011250637 HC RX REV CODE- 250/637: Performed by: FAMILY MEDICINE

## 2017-12-22 PROCEDURE — 36415 COLL VENOUS BLD VENIPUNCTURE: CPT | Performed by: FAMILY MEDICINE

## 2017-12-22 PROCEDURE — 84100 ASSAY OF PHOSPHORUS: CPT | Performed by: FAMILY MEDICINE

## 2017-12-22 PROCEDURE — 74011250637 HC RX REV CODE- 250/637: Performed by: INTERNAL MEDICINE

## 2017-12-22 PROCEDURE — 80053 COMPREHEN METABOLIC PANEL: CPT | Performed by: FAMILY MEDICINE

## 2017-12-22 PROCEDURE — 94640 AIRWAY INHALATION TREATMENT: CPT

## 2017-12-22 PROCEDURE — 74011250637 HC RX REV CODE- 250/637: Performed by: SURGERY

## 2017-12-22 PROCEDURE — 85025 COMPLETE CBC W/AUTO DIFF WBC: CPT | Performed by: FAMILY MEDICINE

## 2017-12-22 PROCEDURE — 74011000250 HC RX REV CODE- 250: Performed by: INTERNAL MEDICINE

## 2017-12-22 PROCEDURE — 83735 ASSAY OF MAGNESIUM: CPT | Performed by: FAMILY MEDICINE

## 2017-12-22 PROCEDURE — 74011636637 HC RX REV CODE- 636/637: Performed by: HOSPITALIST

## 2017-12-22 PROCEDURE — 74011250637 HC RX REV CODE- 250/637: Performed by: HOSPITALIST

## 2017-12-22 RX ORDER — HYDROMORPHONE HYDROCHLORIDE 2 MG/1
2 TABLET ORAL
Qty: 10 TAB | Refills: 0 | Status: SHIPPED | OUTPATIENT
Start: 2017-12-22 | End: 2018-01-03

## 2017-12-22 RX ORDER — FLUCONAZOLE 200 MG/1
200 TABLET ORAL DAILY
Qty: 17 TAB | Refills: 0 | Status: SHIPPED | OUTPATIENT
Start: 2017-12-22 | End: 2018-01-08

## 2017-12-22 RX ADMIN — Medication 1 CAPSULE: at 10:02

## 2017-12-22 RX ADMIN — CARVEDILOL 6.25 MG: 6.25 TABLET, FILM COATED ORAL at 07:43

## 2017-12-22 RX ADMIN — Medication 10 ML: at 06:00

## 2017-12-22 RX ADMIN — HYDROXYCHLOROQUINE SULFATE 200 MG: 200 TABLET, FILM COATED ORAL at 10:03

## 2017-12-22 RX ADMIN — BUDESONIDE 500 MCG: 0.5 INHALANT RESPIRATORY (INHALATION) at 10:52

## 2017-12-22 RX ADMIN — HYDROMORPHONE HYDROCHLORIDE 2 MG: 2 TABLET ORAL at 10:02

## 2017-12-22 RX ADMIN — ARFORMOTEROL TARTRATE 15 MCG: 15 SOLUTION RESPIRATORY (INHALATION) at 10:52

## 2017-12-22 RX ADMIN — ALLOPURINOL 100 MG: 100 TABLET ORAL at 10:03

## 2017-12-22 RX ADMIN — APIXABAN 5 MG: 5 TABLET, FILM COATED ORAL at 10:04

## 2017-12-22 RX ADMIN — GEMFIBROZIL 300 MG: 600 TABLET ORAL at 10:03

## 2017-12-22 RX ADMIN — PREDNISONE 5 MG: 5 TABLET ORAL at 07:43

## 2017-12-22 RX ADMIN — PANTOPRAZOLE SODIUM 40 MG: 40 TABLET, DELAYED RELEASE ORAL at 07:43

## 2017-12-22 RX ADMIN — HYDROMORPHONE HYDROCHLORIDE 2 MG: 2 TABLET ORAL at 04:46

## 2017-12-22 RX ADMIN — CALCIUM 500 MG: 500 TABLET ORAL at 07:43

## 2017-12-22 NOTE — PROGRESS NOTES
Name: Yasmeen Hector   MRN: [id number]  : 1985      Assessment:  ESRD - PD catheter removed 17-> transition to HD yesterday  HTN>>low nl BP now  Peritonitis- fungal infection. 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 Repeat C3 is mild low; C4 is nl  PE- during recent previous admit        Plan/Recommendations:  Stable for discharge  [facility]-> start date there tomorrow  Change to Oral Diflucan  Eliquis resumed  Epogen with HD   Off Losartan/norvasc and Coreg reduced d/t hypotension  Encourage protein intake-> on Nepro      Subjective:  Improved abd pain. Appetite remains fair. PRBC x1 unit yesterday    ROS:   No nausea, no vomiting  No chest pain, no shortness of breath    Exam:  Visit Vitals    /90 (BP 1 Location: Left arm, BP Patient Position: At rest;Sitting)    Pulse 92    Temp 99.4 °F (37.4 °C)    Resp 16    Ht 5' 5\" (1.651 m)    Wt 59.2 kg (130 lb 9.6 oz)    SpO2 99%    BMI 21.73 kg/m2       Gen: NAD  HEENT: No icterus, mmm,  AT/NC  Lungs/Chest wall: Clear. Equal BS. Permacath  Cardiovascular: Regular rate, normal rhythm. Abdomen/: Soft,  BS+. Ext: No peripheral edema  CNS: alert and awake.      Current Facility-Administered Medications   Medication Dose Route Frequency Last Dose    calcium carbonate (OS-REGGIE) tablet 500 mg [elemental]  500 mg Oral BID WITH MEALS 500 mg at 17 0743    0.9% sodium chloride infusion 250 mL  250 mL IntraVENous PRN      HYDROmorphone (DILAUDID) tablet 2 mg  2 mg Oral Q4H PRN 2 mg at 17 9226    epoetin pia (EPOGEN;PROCRIT) injection 10,000 Units  10,000 Units SubCUTAneous DIALYSIS MON, WED & FRI 10,000 Units at 17 5738    heparin (porcine) 1,000 unit/mL injection 1,000 Units  1,000 Units InterCATHeter DIALYSIS PRN      sodium chloride (NS) flush 5-10 mL  5-10 mL IntraVENous Q8H 10 mL at 12/22/17 0600    lidocaine (PF) (XYLOCAINE) 10 mg/mL (1 %) injection 0.1 mL  0.1 mL SubCUTAneous PRN      apixaban (ELIQUIS) tablet 5 mg  5 mg Oral BID 5 mg at 12/21/17 1835    sodium chloride (NS) flush 5-10 mL  5-10 mL IntraVENous Q8H 10 mL at 12/22/17 0600    sodium chloride (NS) flush 5-10 mL  5-10 mL IntraVENous PRN 10 mL at 12/21/17 1604    lidocaine (PF) (XYLOCAINE) 10 mg/mL (1 %) injection 0.5 mL  0.5 mL SubCUTAneous PRN      carvedilol (COREG) tablet 6.25 mg  6.25 mg Oral BID WITH MEALS 6.25 mg at 12/22/17 0743    allopurinol (ZYLOPRIM) tablet 100 mg  100 mg Oral DAILY 100 mg at 12/21/17 0845    azaTHIOprine (IMURAN) tablet 50 mg  50 mg Oral DAILY AFTER BREAKFAST 50 mg at 12/21/17 1059    hydroxychloroquine (PLAQUENIL) tablet 200 mg  200 mg Oral  mg at 12/21/17 1835    pantoprazole (PROTONIX) tablet 40 mg  40 mg Oral ACB 40 mg at 12/22/17 0743    senna (SENOKOT) tablet 8.6 mg  1 Tab Oral QHS PRN      ondansetron (ZOFRAN) injection 4 mg  4 mg IntraVENous Q4H PRN 4 mg at 12/21/17 1743    lactobac ac& pc-s.therm-b.anim (AJIT Q/RISAQUAD)  1 Cap Oral DAILY 1 Cap at 12/21/17 0846    albuterol (PROVENTIL VENTOLIN) nebulizer solution 2.5 mg  2.5 mg Nebulization Q4H PRN      arformoterol (BROVANA) neb solution 15 mcg  15 mcg Nebulization BID RT 15 mcg at 12/21/17 2047    And    budesonide (PULMICORT) 500 mcg/2 ml nebulizer suspension  500 mcg Nebulization BID  mcg at 12/21/17 2047    gemfibrozil (LOPID) tablet 300 mg  300 mg Oral DAILY 300 mg at 12/21/17 0839    predniSONE (DELTASONE) tablet 5 mg  5 mg Oral DAILY WITH BREAKFAST 5 mg at 12/22/17 0743    fluconazole (DIFLUCAN) 200mg/100 mL IVPB (premix)  200 mg IntraVENous Q24H 200 mg at 12/21/17 1609    acetaminophen (TYLENOL) tablet 650 mg  650 mg Oral Q4H  mg at 12/21/17 0049       Labs/Data:    Lab Results   Component Value Date/Time    WBC 6.6 12/22/2017 04:57 AM    Hemoglobin (POC) 11.2 09/16/2015 12:08 PM    HGB 7.9 12/22/2017 04:57 AM    Hematocrit (POC) 33 09/16/2015 12:08 PM    HCT 23.9 12/22/2017 04:57 AM    PLATELET 433 36/31/7831 04:57 AM    MCV 86.0 12/22/2017 04:57 AM       Lab Results   Component Value Date/Time    Sodium 136 12/22/2017 04:57 AM    Potassium 4.1 12/22/2017 04:57 AM    Chloride 100 12/22/2017 04:57 AM    CO2 26 12/22/2017 04:57 AM    Anion gap 10 12/22/2017 04:57 AM    Glucose 69 12/22/2017 04:57 AM    BUN 37 12/22/2017 04:57 AM    Creatinine 5.91 12/22/2017 04:57 AM    BUN/Creatinine ratio 6 12/22/2017 04:57 AM    GFR est AA 10 12/22/2017 04:57 AM    GFR est non-AA 8 12/22/2017 04:57 AM    Calcium 6.8 12/22/2017 04:57 AM       Wt Readings from Last 3 Encounters:   12/21/17 59.2 kg (130 lb 9.6 oz)   12/08/17 65.1 kg (143 lb 9.6 oz)   12/08/17 64.9 kg (143 lb)         Intake/Output Summary (Last 24 hours) at 12/22/17 0954  Last data filed at 12/21/17 1553   Gross per 24 hour   Intake              310 ml   Output                0 ml   Net              310 ml       Patient seen and examined. Chart reviewed. Labs, data and other pertinent notes reviewed in last 24 hrs.     PMH/SH/FH reviewed and unchanged compared to H&P    Discussed with pt,and Dr. Law Rivas MD

## 2017-12-22 NOTE — PROGRESS NOTES
Hospital follow-up PCP transitional care appointment has been scheduled with Dr. Radha Louie  for 1/3/18 at 3:30 p.m. Pending patient discharge.   Stephon Ortez, Care Management Specialist.

## 2017-12-22 NOTE — DISCHARGE INSTRUCTIONS
Discharge Instructions       PATIENT ID: Mitch Keith  MRN: [id number]   YOB: 1985    DATE OF ADMISSION: 12/12/2017  6:46 PM    DATE OF DISCHARGE: 12/22/2017    PRIMARY CARE PROVIDER: Jarod Mora NP       DISCHARGING PHYSICIAN: Chante Stewart MD    To contact this individual call [phone] and ask the  to page. If unavailable ask to be transferred the Adult Hospitalist Department. DISCHARGE DIAGNOSES ESRD, fungal peritonitis. CONSULTATIONS: IP CONSULT TO HOSPITALIST  IP CONSULT TO NEPHROLOGY  IP CONSULT TO INFECTIOUS DISEASES    PROCEDURES/SURGERIES: Procedure(s):  REMOVAL PERITONEAL DIALYSIS CATHETER   TUNNELED DIALYSIS CATHETER INSERTION    PENDING TEST RESULTS:   At the time of discharge the following test results are still pending: NA    FOLLOW UP APPOINTMENTS:   Follow-up Information     Follow up With Details Comments Contact Info    [facility]  Friday at 10:30am, Saturday at 11:15am, and Tuesday at 11:00am [address] [place] [address]    Jarod Mora NP   [address] [place] [address] Drive  [phone]             ADDITIONAL CARE RECOMMENDATIONS:   Please follow up with your primary care provider in 1 to 2 weeks. Please follow up with hemodialysis as you have been scheduled. Follow up with your nephrologist as necessary. DIET: Renal Diet    ACTIVITY: Activity as tolerated      DISCHARGE MEDICATIONS:   See Medication Reconciliation Form    · It is important that you take the medication exactly as they are prescribed. · Keep your medication in the bottles provided by the pharmacist and keep a list of the medication names, dosages, and times to be taken in your wallet. · Do not take other medications without consulting your doctor. NOTIFY YOUR PHYSICIAN FOR ANY OF THE FOLLOWING:   Fever over 101 degrees for 24 hours.    Chest pain, shortness of breath, fever, chills, nausea, vomiting, diarrhea, change in mentation, falling, weakness, bleeding. Severe pain or pain not relieved by medications. Or, any other signs or symptoms that you may have questions about. DISPOSITION:    Home With:   OT  PT  HH  RN       SNF/Inpatient Rehab/LTAC    Independent/assisted living    Hospice    Other:     CDMP Checked:    Yes X       Signed:   Jonna Arguello MD  12/22/2017  9:12 AM

## 2017-12-22 NOTE — PROGRESS NOTES
9:14 AM  CM notified that patient has orders for discharge. CM met with patient to review and discuss disposition needs. Patient will be going to [facility] on a Tuesday,  Thursday, and Saturday schedule at 11:15. Patient will transport self to and from [facility]. CM contacted Nino Brunson [phone] at  facility and notified of patient discharge and faxed discharge instructions to [phone]. Patient to start at [facility] on Saturday. Follow-up is placed on AVS.  Mode of transport at time of discharge to be provided by patient's family. Patient reported family expected to be at hospityal at 11:30 today. CM to notify LIMA Merrill MSW/DANYEL

## 2017-12-22 NOTE — DISCHARGE SUMMARY
Discharge Summary       PATIENT ID: Akua Rodney  MRN: [id number]   YOB: 1985    DATE OF ADMISSION: 12/12/2017  6:46 PM    DATE OF DISCHARGE:   PRIMARY CARE PROVIDER: Nika Saavedra NP       DISCHARGING PHYSICIAN: Jonna Arguello MD    To contact this individual call [phone] and ask the  to page. If unavailable ask to be transferred the Adult Hospitalist Department. CONSULTATIONS: IP CONSULT TO HOSPITALIST  IP CONSULT TO NEPHROLOGY  IP CONSULT TO INFECTIOUS DISEASES    PROCEDURES/SURGERIES: Procedure(s):  REMOVAL PERITONEAL DIALYSIS CATHETER   TUNNELED DIALYSIS CATHETER INSERTION    ADMITTING DIAGNOSES & HOSPITAL COURSE:   33 y.o lady with HTN, SLE w/ lupus nephritis, ESRD on PD, dyslipidemia, VTE, who presented with abdominal pain, initial work-up was consistent with peritonitis. She was recently discharged from here for anasarca due to hypoalbuminemia. 12/18: Fever. 12/19: Placement of right chest tunneled dialysis catheter.  Removal of peritoneal dialysis catheter. 12/21: Bleed transfusion.    12/22: Patient is feeling better. Patient said she is ready to go home and she will go to HD tomorrow. DISCHARGE DIAGNOSES / PLAN:      Peritonitis with associated sepsis: (POA):  Peritoneal fluid cell count showed WBC of 3769 with 90% neutrophils, gram stain with budding yeast. Peritoneal fluid cultures from last admission on 11/27/17 grew light candida parapsilosis. Tachycardia and leukocytosis on admission. Likely due to infected PD catheter. Sepsis syndrome has resolved. -continue IV fluconazole, received intraperitoneal vancomycin and cefepime  -f/u blood cultures - NGTD  -ID, nephrology, vascular surgery consulted. PD cath removed and now on  HD.  -HD as per Dr Jada Rosado. I discussed with Dr Jada Rosado about the plan for discharge today and follow up with HD as an outpatient. Anemia:   No evidence of acute bleeding.  Will transfuse two units of blood today and monitor. Fever on 12/18. Resolved.      Blood culture negative so far, CXR without any acute pathology. Patient is on mmunosuppressant and fever may be due to her SLE as well. Will change her fluconazole to at discharge until 01/08/2018.       Abdominal pain: (POA) likely due to peritonitis. Resolved. CT of the abd showed hepatic steatosis and possible thickening of the right colon. Pain was RUQ predominant, an abd US showed hepatic steatosis and gallbladder sludge      Hypomagnesemia, hypokalemia, hypophosphatemia, hypocalcemia:  -monitor and replete prn      SLE:  -continue azathioprine, Plaquenil, low-dose prednisone      ESRD: on PD and plan for HD. -nephrology consulted  -continue cinacalcet      Mild hyponatremia: resolved      Severe protein-calorie malnutrition: (POA) based on muscle wasting, loss of subq fat, reduced nutritional intake  -nutrition consulted      HTN:  -Now  BP is on the lower side.   -continue lower dose carvedilol with parameter.   -Continue to hold amlodipine, losartan      Hyperlipidemia:  -continue gemfibrozil and       History of VTE:  -Hel apixaban over the weekend per vascular surgery, resume on 12/19 AM.       See orders for other plans. VTE prophylaxis: On Apixaban. Code status: Full. Discussed plan of care with Patient/Family and Nurse. Pre-admission lived at home. Discharge planning: To home today. PENDING TEST RESULTS:   At the time of discharge the following test results are still pending: Cultures.      FOLLOW UP APPOINTMENTS:    Follow-up Information     Follow up With Details Comments Contact Info    [facility]  Friday at 10:30am, Saturday at 11:15am, and Tuesday at 11:00am [address]  [phone]    Duyen Archibald, KHANH   [address] [phone]                   DISCHARGE MEDICATIONS:  Current Discharge Medication List      START taking these medications    Details   HYDROmorphone (DILAUDID) 2 mg tablet Take 1 Tab by mouth every six (6) hours as needed. Max Daily Amount: 8 mg. Qty: 10 Tab, Refills: 0    Associated Diagnoses: Other local lupus erythematosus      fluconazole (DIFLUCAN) 200 mg tablet Take 1 Tab by mouth daily for 17 days. FDA advises cautious prescribing of oral fluconazole in pregnancy. Qty: 17 Tab, Refills: 0         CONTINUE these medications which have NOT CHANGED    Details   predniSONE (DELTASONE) 5 mg tablet Take 5 mg by mouth daily. cinacalcet (SENSIPAR) 30 mg tablet Take 30 mg by mouth daily. fluticasone-vilanterol (BREO ELLIPTA) 200-25 mcg/dose inhaler Take 1 Puff by inhalation daily. Rinse mouth out after use  Qty: 1 Inhaler, Refills: 5    Associated Diagnoses: Acute bronchiolitis with bronchospasm      gemfibrozil (LOPID) 600 mg tablet Take 300 mg by mouth daily. carvedilol (COREG) 6.25 mg tablet Take 12.5 mg by mouth two (2) times daily (with meals). apixaban (ELIQUIS) 5 mg tablet Take 5 mg by mouth two (2) times a day. azaTHIOprine (IMURAN) 50 mg tablet Take 50 mg by mouth daily. albuterol (PROVENTIL HFA, VENTOLIN HFA, PROAIR HFA) 90 mcg/actuation inhaler Take 1-2 Puffs by inhalation every four (4) hours as needed for Wheezing or Shortness of Breath. Qty: 1 Inhaler, Refills: 2      hydroxychloroquine (PLAQUENIL) 200 mg tablet Take 200 mg by mouth two (2) times a day. allopurinol (ZYLOPRIM) 100 mg tablet Take 100 mg by mouth daily. cyanocobalamin (VITAMIN B-12) 2,500 mcg sublingual tablet Take 1 Tab by mouth daily. Qty: 90 Tab, Refills: 1    Associated Diagnoses: Leukopenia; Low vitamin B12 level; Hypotension due to drugs      pantoprazole (PROTONIX) 40 mg tablet Take 1 Tab by mouth Daily (before breakfast). Qty: 30 Tab, Refills: 0      senna (SENNA) 8.6 mg tablet Take 1 Tab by mouth daily as needed.          STOP taking these medications       oxyCODONE IR (ROXICODONE) 5 mg immediate release tablet Comments:   Reason for Stopping:         losartan (COZAAR) 50 mg tablet Comments:   Reason for Stopping:         amLODIPine (NORVASC) 10 mg tablet Comments:   Reason for Stopping:                 NOTIFY YOUR PHYSICIAN FOR ANY OF THE FOLLOWING:   Fever over 101 degrees for 24 hours. Chest pain, shortness of breath, fever, chills, nausea, vomiting, diarrhea, change in mentation, falling, weakness, bleeding. Severe pain or pain not relieved by medications. Or, any other signs or symptoms that you may have questions about. DISPOSITION:    Home With:   OT  PT  HH  RN       Long term SNF/Inpatient Rehab    Independent/assisted living    Hospice    Other:       PATIENT CONDITION AT DISCHARGE:     Functional status    Poor     Deconditioned     Independent      Cognition     Lucid     Forgetful     Dementia      Catheters/lines (plus indication)    Franco     PICC     PEG     None      Code status     Full code     DNR      PHYSICAL EXAMINATION AT DISCHARGE:  General:  Alert, cooperative, no distress, appears stated age. Lungs:   Clear to auscultation bilaterally. Heart:  Regular rate and rhythm, S1, S2 normal, no murmur. Abdomen:   Soft, non-tender. Bowel sounds normal.    Extremities: Extremities normal, atraumatic, no cyanosis or edema. Pulses: 2+ and symmetric all extremities.    Skin: Skin color, texture, turgor normal. No rashes or lesions   Neurologic: CNII-XII intact.            CHRONIC MEDICAL DIAGNOSES:  Problem List as of 12/22/2017  Date Reviewed: 12/22/2017          Codes Class Noted - Resolved    History of pulmonary embolism ICD-10-CM: S98.078  ICD-9-CM: V12.55  12/8/2017 - Present        Hypomagnesemia ICD-10-CM: E83.42  ICD-9-CM: 275.2  10/30/2017 - Present        Hypocalcemia ICD-10-CM: E83.51  ICD-9-CM: 275.41  10/30/2017 - Present        Hypokalemia ICD-10-CM: E87.6  ICD-9-CM: 276.8  10/27/2017 - Present        Hypertension (Chronic) ICD-10-CM: I10  ICD-9-CM: 401.9  10/14/2017 - Present        Chronic bilateral low back pain without sciatica ICD-10-CM: M54.5, G89.29  ICD-9-CM: 724.2, 338.29  5/26/2017 - Present        Anemia of renal disease ICD-10-CM: D63.1  ICD-9-CM: 285.21  2/21/2017 - Present        Dependence on peritoneal dialysis [facility]) ICD-10-CM: Z99.2  ICD-9-CM: V45.11  2/21/2017 - Present        ACP (advance care planning) ICD-10-CM: Z71.89  ICD-9-CM: V65.49  2/21/2017 - Present        ESRD on peritoneal dialysis [facility]) (Chronic) ICD-10-CM: N18.6, Z99.2  ICD-9-CM: 585.6, V45.11  10/7/2016 - Present        Malignant hypertension ICD-10-CM: I10  ICD-9-CM: 401.0  7/11/2016 - Present        Encounter for monitoring opioid maintenance therapy ICD-10-CM: Z51.81, Z79.891  ICD-9-CM: V58.83, V58.69  11/3/2015 - Present        Lupus nephritis ([facility] ICD-10-CM: M32.14  ICD-9-CM: 710.0, 583.81  3/10/2012 - Present        Lupus ICD-10-CM: L93.0  ICD-9-CM: 695.4  2/27/2012 - Present        * (Principal)RESOLVED: Sepsis ([facility] ICD-10-CM: A41.9  ICD-9-CM: 038.9, 995.91  12/12/2017 - 12/22/2017        RESOLVED: Pneumonia ICD-10-CM: J18.9  ICD-9-CM: 475  10/14/2017 - 12/8/2017        RESOLVED: Pleural effusion, left ICD-10-CM: J90  ICD-9-CM: 511.9  10/14/2017 - 12/8/2017        RESOLVED: Idiopathic chronic inflammatory bowel disease ICD-10-CM: K63.89  ICD-9-CM: 558.9  8/28/2013 - 8/28/2013        RESOLVED: Abdominal pain ICD-10-CM: R10.9  ICD-9-CM: 789.00  8/28/2013 - 9/3/2013        RESOLVED: Hypoxia ICD-10-CM: R09.02  ICD-9-CM: 799.02  4/25/2013 - 4/30/2013        RESOLVED: Lupus nephritis ([facility] ICD-10-CM: M32.14  ICD-9-CM: 710.0, 583.81  4/27/2012 - 4/28/2012              Greater than 45 minutes were spent with the patient on counseling and coordination of care    Signed:   Orin De Dios MD  12/22/2017  9:14 AM

## 2017-12-22 NOTE — PROGRESS NOTES
Bedside and Verbal shift change report given to Rodney Kumar RN (oncoming nurse) by Felipa Mason RN (offgoing nurse). Report included the following information SBAR, Kardex, Procedure Summary, Intake/Output, MAR, Accordion and Recent Results.

## 2017-12-23 LAB — 1,3 BETA GLUCAN SER-MCNC: 150 PG/ML

## 2017-12-24 LAB
BACTERIA SPEC CULT: NORMAL
SERVICE CMNT-IMP: NORMAL

## 2017-12-27 LAB
BACTERIA SPEC CULT: ABNORMAL
BACTERIA SPEC CULT: ABNORMAL
SERVICE CMNT-IMP: ABNORMAL

## 2017-12-29 ENCOUNTER — PATIENT OUTREACH (OUTPATIENT)
Dept: FAMILY MEDICINE CLINIC | Age: 32
End: 2017-12-29

## 2017-12-29 NOTE — PROGRESS NOTES
Cele Segovia is a 33 y.o. female   This patient was observed to have been discharge from [facility] on 12/22/17 folowing admission for Peritonitis with associated sepsis. NN was able to reach patient at her home and patient was verified by 3 identifiers. Patient stated, \"I was in the hospital for 12 days and came home on the 22nd. My peritoneal dialysis port was infected, and they had to take it out. They put in a HD shunt and I am now on Hemodialysis 3 days a week. I attend the [facility] center on T [place] and Sat. \" On admission patient complained of  having increased right flank pain and lower back pain for the past 1-2 weeks. She states that she has also had a decreased appetite \"since October\" and has lost a significant amount of weight. Patient denies having any fever, chills, numbness, leg swelling, rash, nausea, vomiting, or bowel abnormalities. Patient has had 8 Ed admissions and 5 in-patient admissions this year. History of ESRD and CRF. Patient confirmed she lyric;l be going to [facility] on a Tuesday,  Thursday, and Saturday schedule at 11:15. Patient will transport self to and from [facility]. CM contacted Mima Mojica [phone] at  facility and notified of patient discharge and faxed discharge instructions to [phone]. NN called Mima Mojica [phone], who confirmed that Patient will start at [facility] on Saturday. PROCEDURES/SURGERIES: Procedure(s):  REMOVAL PERITONEAL DIALYSIS CATHETER   TUNNELED DIALYSIS CATHETER INSERTION  Inpatient RRAT Score: 55  Patient's challenges to self management identified:  Patient has home care services by [employer]. Decline other home care services at this time. Lives with mother    Medication Management: good understanding, good adherence. Verbalized that all medications are in the home. Summary of patients top three problems:     Problem 1: ESRD- removal of peritoneal dialysis cath.  Sepsis- 11/28/2017 12:00 PM - [facility]   Component Results   Component Value Flag Ref Range Units Status   Lactic acid 3.6 (HH) 0.4 - 2.0 MMOL/L Final   Comment:     12/12/2017 11:12 PM - [facility]   Component Results   Component Value Flag Ref Range Units Status   Lactic acid 1.0  0.4 - 2.0 MMOL/L Final     12/12/2017  8:25 PM - [facility]   Component Results   Component Value Flag Ref Range Units Status   WBC 19.0 (H) 3.6 - 11.0 K/uL Final   RBC 3.95  3.80 - 5.20 M/uL Final   HGB 11.2 (L) 11.5 - 16.0 g/dL Final   HCT 32.4 (L) 35.0 - 47.0 % Final   MCV 82.0  80.0 - 99.0 FL Final   MCH 28.4  26.0 - 34.0 PG Final   MCHC 34.6  30.0 - 36.5 g/dL Final   RDW 17.8 (H) 11.5 - 14.5 % Final   PLATELET 050 (H) 261 - 400 K/uL Final   NEUTROPHILS 90 (H) 32 - 75 % Final   LYMPHOCYTES 6 (L) 12 - 49 % Final   MONOCYTES 4 (L) 5 - 13 % Final   EOSINOPHILS 0  0 - 7 % Final   BASOPHILS 0  0 - 1 % Final   ABS. NEUTROPHILS 17.1 (H) 1.8 - 8.0 K/UL Final   ABS. LYMPHOCYTES 1.1  0.8 - 3.5 K/UL Final   ABS. MONOCYTES 0.8  0.0 - 1.0 K/UL Final   ABS. EOSINOPHILS 0.0  0.0 - 0.4 K/UL Final   ABS. BASOPHILS 0.0  0.0 - 0.1 K/UL Final   DF SMEAR SCANNED     Final   RBC COMMENTS ANISOCYTOSIS   1+        Final   RBC COMMENTS TARGET CELLS   PRESENT          12/12/2017  9:47 PM - [facility]   Component Results   Component Value Flag Ref Range Units Status   Sodium 132 (L) 136 - 145 mmol/L Final   Potassium 2.9 (L) 3.5 - 5.1 mmol/L Final   Chloride 93 (L) 97 - 108 mmol/L Final   CO2 29  21 - 32 mmol/L Final   Anion gap 10  5 - 15 mmol/L Final   Glucose 86  65 - 100 mg/dL Final   BUN 33 (H) 6 - 20 MG/DL Final   Creatinine 7.90 (H) 0.55 - 1.02 MG/DL Final   BUN/Creatinine ratio 4 (L) 12 - 20   Final   GFR est AA 7 (L) >60 ml/min/1.73m2 Final   GFR est non-AA 6 (L) >60 ml/min/1.73m2 Final          Problem 2: Pain-who presented with abdominal pain, initial work-up was consistent with peritonitis.  She was recently discharged from hospital for anasarca due to hypoalbuminemia. Problem 3: malnutrition- hyponatremia, Severe protein-calorie malnutrition: (POA) based on muscle wasting, loss of subq fat, reduced nutritional intake  -nutrition consulted during hospital stay    Patients motivational level on a scale of 0-10: 8  Advance Care Planning:   Patient was offered the opportunity to discuss advance care planning:  no     Does patient have an Advance Directive:  no   If no, did you provide information on Advance Care Planning? Not discussed during this assessment. [employer], Referrals, and Durable Medical Equipment: none    Follow up appointments:  Future Appointments      Provider Rocio Hardy   1/3/2018 3:00 PM Timur Flores  [facility]   1/3/2018 3:30 PM Dirk Contreras NP [facility]        Goals        Patient [address] [facility] (pt-stated)            10/19/17- NN did not discuss this with patient. Will plan to discuss with patient during upcoming call. Sc         Patient/Family verbalizes understanding of self-management of  disease. (pt-stated)            11/29/17- Patient verbalized understanding of hypokalemia and treatment plan was review with patient PK    How can you care for yourself at home? · If your doctor recommends it, eat foods that have a lot of potassium. These include fresh fruits, juices, and vegetables. They also include nuts, beans, and milk. · Be safe with medicines. If your doctor prescribes medicines or potassium supplements, take them exactly as directed. Call your doctor if you have any problems with your medicines. · Get your potassium levels tested as often as your doctor tells you.  Prevention of infection (pt-stated)            10/19/17- patient will assess/closely monitor her temperatures  and report fevers greater than 101.    Patient will assess hydration status by consuming at least six cups of water to avoid dehydration. Patient will take Levaquin as prescribed. sc  10/31/17- NN instructed patient on importance of taking all medications as ordered, follow-ups as scheduled- PK    Call your doctor now or seek immediate medical care if:  ? · You cough up dark brown or bloody mucus (sputum). ? · You have new or worse trouble breathing. ? · You are dizzy or lightheaded, or you feel like you may faint. ? Watch closely for changes in your health, and be sure to contact your doctor if:  ? · You have a new or higher fever. ? · You are coughing more deeply or more often. ? · You are not getting better after 2 days (48 hours). ? · You do not get better as expected. Current Outpatient Prescriptions   Medication Sig    HYDROmorphone (DILAUDID) 2 mg tablet Take 1 Tab by mouth every six (6) hours as needed. Max Daily Amount: 8 mg.  fluconazole (DIFLUCAN) 200 mg tablet Take 1 Tab by mouth daily for 17 days. FDA advises cautious prescribing of oral fluconazole in pregnancy.  predniSONE (DELTASONE) 5 mg tablet Take 5 mg by mouth daily.  cinacalcet (SENSIPAR) 30 mg tablet Take 30 mg by mouth daily.  fluticasone-vilanterol (BREO ELLIPTA) 200-25 mcg/dose inhaler Take 1 Puff by inhalation daily. Rinse mouth out after use    gemfibrozil (LOPID) 600 mg tablet Take 300 mg by mouth daily.  carvedilol (COREG) 6.25 mg tablet Take 12.5 mg by mouth two (2) times daily (with meals).  apixaban (ELIQUIS) 5 mg tablet Take 5 mg by mouth two (2) times a day.  azaTHIOprine (IMURAN) 50 mg tablet Take 50 mg by mouth daily.  albuterol (PROVENTIL HFA, VENTOLIN HFA, PROAIR HFA) 90 mcg/actuation inhaler Take 1-2 Puffs by inhalation every four (4) hours as needed for Wheezing or Shortness of Breath.  hydroxychloroquine (PLAQUENIL) 200 mg tablet Take 200 mg by mouth two (2) times a day.  allopurinol (ZYLOPRIM) 100 mg tablet Take 100 mg by mouth daily.     cyanocobalamin (VITAMIN B-12) 2,500 mcg sublingual tablet Take 1 Tab by mouth daily.  pantoprazole (PROTONIX) 40 mg tablet Take 1 Tab by mouth Daily (before breakfast).  senna (SENNA) 8.6 mg tablet Take 1 Tab by mouth daily as needed. No current facility-administered medications for this visit. Patient verbalized understanding of all information discussed. Patient has this Nurse Navigators contact information for any further questions, concerns, or needs.

## 2018-01-03 ENCOUNTER — OFFICE VISIT (OUTPATIENT)
Dept: NEUROLOGY | Age: 33
End: 2018-01-03

## 2018-01-03 VITALS
BODY MASS INDEX: 22.49 KG/M2 | DIASTOLIC BLOOD PRESSURE: 78 MMHG | HEIGHT: 65 IN | OXYGEN SATURATION: 97 % | HEART RATE: 52 BPM | SYSTOLIC BLOOD PRESSURE: 112 MMHG | WEIGHT: 135 LBS

## 2018-01-03 DIAGNOSIS — M54.50 CHRONIC BILATERAL LOW BACK PAIN WITHOUT SCIATICA: Primary | ICD-10-CM

## 2018-01-03 DIAGNOSIS — G89.29 CHRONIC BILATERAL LOW BACK PAIN WITHOUT SCIATICA: Primary | ICD-10-CM

## 2018-01-03 RX ORDER — OXYCODONE HYDROCHLORIDE 5 MG/1
TABLET ORAL
Qty: 40 TAB | Refills: 0 | Status: SHIPPED | OUTPATIENT
Start: 2018-01-03 | End: 2018-01-29 | Stop reason: SDUPTHER

## 2018-01-03 NOTE — PROGRESS NOTES
Interval HPI:     This is a 33 y.o. female who is following up for     PMHx: chronic back pain, hx of fibromyalgia, hx of DVT/ coumadin, hx of Lupus, mild lower lumbar disc degeneration (L4-5 and L5-S1 with small annular tear at L5-S1)    Chief Complaint   Patient presents with    Pain (Chronic)       Since last visit, she had port infection and had to have surgery. Was given a Rx by surgeon for hydromorphone 2 mg (#10 total) to take for post-op pain. Pt says she couldn't take as they were too strong. Continues to have moderate to severe lower back pain. Rates average daily pain as 3-4/ 10 with her medications, some days up to 6-7/ 10. Reports that she takes one Oxycodone IR 5 mg tab when she wakes up and maybe 2 days a week will take an extra tablet. Tried/ failed: Gabapentin, Cymbalta, Amitriptyline (only helped get to sleep), Lyrica (abnormal behaviors)    Reviewed : only filling pain Rx from my office, no aberrant behavior. Last filled Oxycodone on 12-9-17 (#40 tabs)  Pill count: 4 Oxycodone IR 5 mg tablets left    UDS Hx:  March 2017: consistent with Rx pain medication  9-13-17 UDS: only screened for codeine or morphine, which pt doesn't take      Brief ROS: as above  There have been no significant changes in PMHx, PSHx, SHx except as noted above. Allergies   Allergen Reactions    Compazine [Prochlorperazine Edisylate] Nausea and Vomiting and Palpitations     Current Outpatient Prescriptions   Medication Sig Dispense Refill    oxyCODONE IR (ROXICODONE) 5 mg immediate release tablet Take 1 tab in AM and HALF to 1 tab in PM if needed on days where back pain is severe  (Patient taking differently: Take 5 mg by mouth two (2) times daily as needed. Take 1 tab in AM and HALF to 1 tab in PM if needed on days where back pain is severe ) 40 Tab 0    predniSONE (DELTASONE) 5 mg tablet Take 5 mg by mouth daily.       fluticasone-vilanterol (BREO ELLIPTA) 200-25 mcg/dose inhaler Take 1 Puff by inhalation daily. Rinse mouth out after use 1 Inhaler 5    gemfibrozil (LOPID) 600 mg tablet Take 300 mg by mouth daily.  carvedilol (COREG) 6.25 mg tablet Take 12.5 mg by mouth two (2) times daily (with meals).  apixaban (ELIQUIS) 5 mg tablet Take 5 mg by mouth.  azaTHIOprine (IMURAN) 50 mg tablet Take 50 mg by mouth daily (after breakfast).  albuterol (PROVENTIL HFA, VENTOLIN HFA, PROAIR HFA) 90 mcg/actuation inhaler Take 1-2 Puffs by inhalation every four (4) hours as needed for Wheezing or Shortness of Breath. 1 Inhaler 2    hydroxychloroquine (PLAQUENIL) 200 mg tablet Take 200 mg by mouth two (2) times a day.  allopurinol (ZYLOPRIM) 100 mg tablet Take 100 mg by mouth daily (after breakfast). Needs to TAKE on a full stomach; will cause her to be nauseated.  cyanocobalamin (VITAMIN B-12) 2,500 mcg sublingual tablet Take 1 Tab by mouth daily. 90 Tab 1    pantoprazole (PROTONIX) 40 mg tablet Take 1 Tab by mouth Daily (before breakfast). 30 Tab 0    senna (SENNA) 8.6 mg tablet Take 1 Tab by mouth daily as needed.  HYDROcodone-acetaminophen (NORCO) 5-325 mg per tablet Take 2 Tabs by mouth every four (4) hours as needed for Pain. Max Daily Amount: 12 Tabs. 40 Tab 0       Physical Exam  Vitals:    01/03/18 1511   BP: 112/78   BP 1 Location: Left arm   BP Patient Position: Sitting   Pulse: (!) 52   SpO2: 97%   Weight: 61.2 kg (135 lb)   Height: 5' 5\" (1.651 m)       Resting, NAD, appears very fatigued  Awake, alert and conversant     Focused Neurological Exam     Mental status:   Alert and oriented to person, place situation  Mood appears stable  Normal thought processes    CNs: EOMI, Face symmetric, Hearing/ Language normal  Sensory: intact light touch in both legs  Motor: 4/ 5 strength in arms and legs    Reflexes: 1+ patellars  Gait: not observed    Impression    ICD-10-CM ICD-9-CM    1.  Chronic bilateral low back pain without sciatica M54.5 724.2 oxyCODONE IR (ROXICODONE) 5 mg immediate release tablet    G89.29 338.29 DRUG SCREEN 11 W/CONF, SERUM          Renewed Rx Oxycodone IR 5 mg tablet (#40 tabs) to take 1 tab in AM and occasionally HALF to one tab in evenings if pain is severe. Pt will check with [facility] to see if they take her new insurance. If so, will get drug screen/ blood draw done since is almost anuric. I asked nurse to try and get mouth swab/ drug screen kit in office so that we can check that if pt can't urinate or get blood drawn.   Follow up in 4 weeks

## 2018-01-03 NOTE — PROGRESS NOTES
Pill count =  4 oxycodone 5 mg IR tabs      Pill count verified with patient. UDS obtained and sent =  Pain contract = on file   made available for MD review.

## 2018-01-03 NOTE — MR AVS SNAPSHOT
Visit Information Date & Time Provider Department Dept. Phone Encounter #  
 1/3/2018  3:00 PM Arlette Lockett MD [facility] Neurology [facility] [phone] [id number] Upcoming Health Maintenance Date Due DTaP/Tdap/Td series (1 - Tdap) 2/17/2007 Pneumococcal 19-64 Highest Risk (2 of 3 - PCV13) 10/1/2010 PAP AKA CERVICAL CYTOLOGY 4/13/2019 Allergies as of 1/3/2018  Review Complete On: 1/3/2018 By: Ivonne Nova LPN Severity Noted Reaction Type Reactions Compazine [Prochlorperazine Edisylate] High 07/11/2016   Systemic Nausea and Vomiting, Palpitations Fish Containing Products High 02/21/2017   Side Effect Rash An itchy rash appeared in about 1 hour Current Immunizations  Reviewed on 10/30/2017 Name Date Influenza Vaccine 9/12/2017, 9/9/2012 Influenza Vaccine Split 9/1/2011 ZZZ-RETIRED (DO NOT USE) Pneumococcal Vaccine (Unspecified Type) 10/1/2009 Not reviewed this visit You Were Diagnosed With   
  
 Codes Comments Chronic bilateral low back pain without sciatica    -  Primary ICD-10-CM: M54.5, G89.29 ICD-9-CM: 724.2, 338.29 Vitals BP Pulse Height(growth percentile) Weight(growth percentile) SpO2 BMI  
 112/78 (BP 1 Location: Left arm, BP Patient Position: Sitting) (!) 52 5' 5\" (1.651 m) 135 lb (61.2 kg) 97% 22.47 kg/m2 OB Status Smoking Status Medically Induced Never Smoker Vitals History BMI and BSA Data Body Mass Index Body Surface Area  
 22.47 kg/m 2 1.68 m 2 Preferred Pharmacy Pharmacy Name Phone [address], [facility] [address] Your Updated Medication List  
  
   
This list is accurate as of: 1/3/18  3:37 PM.  Always use your most recent med list.  
  
  
  
  
 albuterol 90 mcg/actuation inhaler Commonly known as:  PROVENTIL HFA, VENTOLIN HFA, PROAIR HFA  
 Take 1-2 Puffs by inhalation every four (4) hours as needed for Wheezing or Shortness of Breath. allopurinol 100 mg tablet Commonly known as:  Alexander Choe Take 100 mg by mouth daily. apixaban 5 mg tablet Commonly known as:  Joshua Dorsey Take 5 mg by mouth two (2) times a day. azaTHIOprine 50 mg tablet Commonly known as:  [employer] Take 50 mg by mouth daily. COREG 6.25 mg tablet Generic drug:  carvedilol Take 12.5 mg by mouth two (2) times daily (with meals). cyanocobalamin 2,500 mcg sublingual tablet Commonly known as:  VITAMIN B-12 Take 1 Tab by mouth daily. fluconazole 200 mg tablet Commonly known as:  DIFLUCAN Take 1 Tab by mouth daily for 17 days. FDA advises cautious prescribing of oral fluconazole in pregnancy. fluticasone-vilanterol 200-25 mcg/dose inhaler Commonly known as:  BREO ELLIPTA Take 1 Puff by inhalation daily. Rinse mouth out after use  
  
 gemfibrozil 600 mg tablet Commonly known as:  LOPID Take 300 mg by mouth daily. oxyCODONE IR 5 mg immediate release tablet Commonly known as:  Johny Montrose Take 1 tab in AM and HALF to 1 tab in PM if needed on days where back pain is severe  
  
 pantoprazole 40 mg tablet Commonly known as:  PROTONIX Take 1 Tab by mouth Daily (before breakfast). PLAQUENIL 200 mg tablet Generic drug:  hydroxychloroquine Take 200 mg by mouth two (2) times a day. predniSONE 5 mg tablet Commonly known as:  Derryl Situ Take 5 mg by mouth daily. Senna 8.6 mg tablet Generic drug:  senna Take 1 Tab by mouth daily as needed. SENSIPAR 30 mg tablet Generic drug:  cinacalcet Take 30 mg by mouth daily. Prescriptions Printed Refills  
 oxyCODONE IR (ROXICODONE) 5 mg immediate release tablet 0 Sig: Take 1 tab in AM and HALF to 1 tab in PM if needed on days where back pain is severe   
 Class: Print We Performed the Following DRUG SCREEN 11 W/CONF, SERUM [[id number] Custom] Introducing [facility]! Lo Rosales introduces [employer] patient portal. Now you can access parts of your medical record, email your doctor's office, and request medication refills online. 1. In your internet browser, go to [email or web address] 2. Click on the First Time User? Click Here link in the Sign In box. You will see the New Member Sign Up page. 3. Enter your [employer] Access Code exactly as it appears below. You will not need to use this code after youve completed the sign-up process. If you do not sign up before the expiration date, you must request a new code. · [employer] Access Code: [email or web address] Expires: 2/26/2018 12:44 PM 
 
4. Enter the last four digits of your Social Security Number (xxxx) and Date of Birth (mm/dd/yyyy) as indicated and click Submit. You will be taken to the next sign-up page. 5. Create a [employer] ID. This will be your [employer] login ID and cannot be changed, so think of one that is secure and easy to remember. 6. Create a [employer] password. You can change your password at any time. 7. Enter your Password Reset Question and Answer. This can be used at a later time if you forget your password. 8. Enter your e-mail address. You will receive e-mail notification when new information is available in [address] 9. Click Sign Up. You can now view and download portions of your medical record. 10. Click the Download Summary menu link to download a portable copy of your medical information. If you have questions, please visit the Frequently Asked Questions section of the [employer] website. Remember, [employer] is NOT to be used for urgent needs. For medical emergencies, dial 911. Now available from your iPhone and Android! Please provide this summary of care documentation to your next provider. Your primary care clinician is listed as Janelle Butler.  If you have any questions after today's visit, please call [phone].

## 2018-01-05 LAB
BACTERIA SPEC CULT: NORMAL
SERVICE CMNT-IMP: NORMAL

## 2018-01-12 ENCOUNTER — PATIENT OUTREACH (OUTPATIENT)
Dept: FAMILY MEDICINE CLINIC | Age: 33
End: 2018-01-12

## 2018-01-12 NOTE — PROGRESS NOTES
Outbound call to patient today to follow up for SURJIT. Patient verified by 3 identifiers. States she is feeling much better. The hemodialysis is working out much better. She is getting more rest. Patient did not show for appointment scheduled on 1/3/17 with PCp. Patient states she followed up with her neurologist on 1/3/17 and could not make both appointments. Patient is scheduled for AV graft on 1/19/17. Medication reconciliation done and the following appointments discussed:  Future Appointments      Provider Rocio Hardy   1/17/2018 1:00 PM SFLINDSEY [facility] [place] [facility] OR PRE AS   1/29/2018 2:00 PM Chelly Butcher MD [facility] Neurology [facility]      . Current Outpatient Prescriptions   Medication Sig    oxyCODONE IR (ROXICODONE) 5 mg immediate release tablet Take 1 tab in AM and HALF to 1 tab in PM if needed on days where back pain is severe     predniSONE (DELTASONE) 5 mg tablet Take 5 mg by mouth daily.  cinacalcet (SENSIPAR) 30 mg tablet Take 30 mg by mouth daily.  fluticasone-vilanterol (BREO ELLIPTA) 200-25 mcg/dose inhaler Take 1 Puff by inhalation daily. Rinse mouth out after use    gemfibrozil (LOPID) 600 mg tablet Take 300 mg by mouth daily.  carvedilol (COREG) 6.25 mg tablet Take 12.5 mg by mouth two (2) times daily (with meals).  apixaban (ELIQUIS) 5 mg tablet Take 5 mg by mouth two (2) times a day.  azaTHIOprine (IMURAN) 50 mg tablet Take 50 mg by mouth daily.  albuterol (PROVENTIL HFA, VENTOLIN HFA, PROAIR HFA) 90 mcg/actuation inhaler Take 1-2 Puffs by inhalation every four (4) hours as needed for Wheezing or Shortness of Breath.  hydroxychloroquine (PLAQUENIL) 200 mg tablet Take 200 mg by mouth two (2) times a day.  allopurinol (ZYLOPRIM) 100 mg tablet Take 100 mg by mouth daily.  cyanocobalamin (VITAMIN B-12) 2,500 mcg sublingual tablet Take 1 Tab by mouth daily.     pantoprazole (PROTONIX) 40 mg tablet Take 1 Tab by mouth Daily (before breakfast).  senna (SENNA) 8.6 mg tablet Take 1 Tab by mouth daily as needed. No current facility-administered medications for this visit. Case management Plan:  NN will continue to attempt contacts with patient by telephone or during office visit within next 7-10 days. Will continue to follow as necessary for the remaining 30 days post visit and will reassess to see if CCM assessment is needed before discharge.

## 2018-01-17 ENCOUNTER — HOSPITAL ENCOUNTER (OUTPATIENT)
Dept: GENERAL RADIOLOGY | Age: 33
Discharge: HOME | End: 2018-01-17
Payer: MEDICARE

## 2018-01-17 ENCOUNTER — HOSPITAL ENCOUNTER (OUTPATIENT)
Dept: PREADMISSION TESTING | Age: 33
Discharge: HOME | End: 2018-01-17
Payer: MEDICARE

## 2018-01-17 VITALS
TEMPERATURE: 97.8 F | OXYGEN SATURATION: 97 % | WEIGHT: 146.61 LBS | HEIGHT: 65 IN | DIASTOLIC BLOOD PRESSURE: 98 MMHG | HEART RATE: 86 BPM | BODY MASS INDEX: 24.43 KG/M2 | SYSTOLIC BLOOD PRESSURE: 136 MMHG | RESPIRATION RATE: 16 BRPM

## 2018-01-17 LAB
ABO + RH BLD: NORMAL
ATRIAL RATE: 83 BPM
BLOOD GROUP ANTIBODIES SERPL: NORMAL
CALCULATED P AXIS, ECG09: 30 DEGREES
CALCULATED R AXIS, ECG10: 40 DEGREES
CALCULATED T AXIS, ECG11: 30 DEGREES
DIAGNOSIS, 93000: NORMAL
P-R INTERVAL, ECG05: 120 MS
Q-T INTERVAL, ECG07: 346 MS
QRS DURATION, ECG06: 62 MS
QTC CALCULATION (BEZET), ECG08: 406 MS
SPECIMEN EXP DATE BLD: NORMAL
VENTRICULAR RATE, ECG03: 83 BPM

## 2018-01-17 PROCEDURE — 36415 COLL VENOUS BLD VENIPUNCTURE: CPT

## 2018-01-17 PROCEDURE — 71046 X-RAY EXAM CHEST 2 VIEWS: CPT

## 2018-01-17 PROCEDURE — 93005 ELECTROCARDIOGRAM TRACING: CPT

## 2018-01-17 PROCEDURE — 86900 BLOOD TYPING SEROLOGIC ABO: CPT

## 2018-01-17 NOTE — PERIOP NOTES
Informed patient to look at her medication packages and please call name of medications for Blood Pressure, and Times of Eliquis, and clarify Sensibar. Instructed patient to [facility] at [phone] with her name and . Verbally understood. Located last nephrology note and Losrtan, Amlodipine and Caraballo Nightingale have been discontinued. Nurse Navigator note is current. DOS: 18.

## 2018-01-17 NOTE — PERIOP NOTES
[facility]  PREOPERATIVE INSTRUCTIONS    Surgery Date:   Friday, 1/19/18. Surgery arrival time given by surgeon: NO  (If [facility] staff will call you between 3pm - 7pm the day before surgery with your arrival time. If your surgery is on a Monday, we will call you the preceding Friday. Please call [phone] after 7pm if you did not receive your arrival time.) Verification phone call on Thursday, 1/18. 18.  1. Report  to the 2nd [address] [facility] on the day of your surgery. Bring your insurance card, photo identification, and any copayment (if applicable). 2. You must have a responsible adult to drive you home and stay with you the first 24 hours after surgery if you are going home the same day of your surgery. 3. Nothing to eat or drink after midnight the night before surgery. This means NO water, gum, mints, coffee, juice, etc.    4. MEDICATIONS TO TAKE THE MORNING OF SURGERY WITH A SIP OF WATER: Carvedilol and Protonix. May TAKE pain medication if needed. 5. No alcoholic beverages 24 hours before and after your surgery. 6. If you are being admitted to the hospital,please leave personal belongings/luggage in your car until you have an assigned hospital room number. ( The hospital discharge time is 12 PM NOON. Your adult  should be at the hospital prior to the noon discharge time unless otherwise instructed.)   7. STOP Aspirin and/or any non-steroidal anti-inflammatory drugs (i.e. Ibuprofen, Naproxen, Advil, Aleve) as directed by your surgeon. You may take Tylenol. Stop herbal supplements 1 week prior to  surgery. 8. If you are currently taking Plavix, Coumadin,or any other blood-thinning/ anticoagulant medication contact your surgeon for instructions. 9. Wear comfortable clothes. Wear your glasses instead of contacts. Please leave all money, jewelry and valuables at home. No make up, particularly mascara, the day of surgery. 10.  REMOVE ALL body piercings, rings,and jewelry and leave at home. Wear your hair loose or down, no pony-tails, buns, or any metal hair clips. 11. If you shower the morning of surgery, please do not apply any lotions, powders, or deodorants afterwards. Do not shave any body area within 24 hours of your surgery. 12. Please follow all instructions to avoid any potential surgical cancellation. 13. Should your physical condition change, (i.e. fever, cold, flu, etc.) please notify your surgeon as soon as possible. 14. It is important to be on time. If a situation occurs where you may be delayed, please call:  [phone] / [phone] on the day of surgery. 15. The Preadmission Testing staff can be reached at 21 [phone]. 16. Special instructions: Free  parking. Bring completed medication form on day of surgery. Start using your incentive spirometry today. 17. The patient was contacted  in person. She  verbalize  understanding of all instructions does not  need reinforcement.

## 2018-01-18 ENCOUNTER — TELEPHONE (OUTPATIENT)
Dept: SURGERY | Age: 33
End: 2018-01-18

## 2018-01-18 ENCOUNTER — ANESTHESIA EVENT (OUTPATIENT)
Dept: SURGERY | Age: 33
End: 2018-01-18
Payer: MEDICARE

## 2018-01-18 LAB
6-ACETYLMORPHINE: NORMAL
AMPHETAMINES SERPL QL SCN: NEGATIVE NG/ML
BARBITURATES SERPL QL SCN: NEGATIVE UG/ML
BENZODIAZ SERPL QL SCN: NEGATIVE NG/ML
CANNABINOIDS SERPL QL SCN: NEGATIVE NG/ML
COCAINE+BZE SERPL QL SCN: NEGATIVE NG/ML
CODEINE, 737848: NORMAL
DIHYDROCODEINE, 764159: NORMAL
ETHANOL UR-MCNC: NEGATIVE GM/DL
HYDROCODONE, 737854: NORMAL
HYDROMORPHONE, 737852: NORMAL
METHADONE SERPL QL SCN: NEGATIVE NG/ML
MORPHINE, 737850: NORMAL
OPIATES SERPL QL SCN: NORMAL NG/ML
OPIATES SPEC QL: NORMAL
OXYCOCONE: NORMAL
OXYCODONES CONFIRMATION, 738393: NORMAL
OXYCODONES, 738315: NORMAL NG/ML
OXYMORPHONE, 763902: NORMAL
PCP SERPL QL SCN: NEGATIVE NG/ML
PROPOXYPH SERPL QL SCN: NEGATIVE NG/ML

## 2018-01-18 NOTE — TELEPHONE ENCOUNTER
Left v/m to call office to reschedule bilateral PVR upper extremity. Ms Pascual Duty returned call. Pt has had PD catheter removed & is scheduled for surgery on 1/19/18 with Dr Felecia Everett. Will cancel order for PVR upper extremity for pt.

## 2018-01-18 NOTE — TELEPHONE ENCOUNTER
Patient said she received a call from here. She is returning the call. She states to call her number twice.

## 2018-01-18 NOTE — PERIOP NOTES
The H&P from Dr. Sara Blakely office is not signed.   Left a VM for Suma Leigh at Dr. Sara Blakely office requesting a signed H&P.  DOS: 1/19/2018

## 2018-01-19 ENCOUNTER — HOSPITAL ENCOUNTER (OUTPATIENT)
Age: 33
Setting detail: OUTPATIENT SURGERY
Discharge: HOME | End: 2018-01-19
Payer: MEDICARE

## 2018-01-19 ENCOUNTER — ANESTHESIA (OUTPATIENT)
Dept: SURGERY | Age: 33
End: 2018-01-19
Payer: MEDICARE

## 2018-01-19 VITALS
WEIGHT: 146.61 LBS | HEART RATE: 103 BPM | SYSTOLIC BLOOD PRESSURE: 124 MMHG | DIASTOLIC BLOOD PRESSURE: 99 MMHG | OXYGEN SATURATION: 95 % | BODY MASS INDEX: 24.43 KG/M2 | HEIGHT: 65 IN | RESPIRATION RATE: 23 BRPM | TEMPERATURE: 98.5 F

## 2018-01-19 DIAGNOSIS — N18.6 ESRD ON PERITONEAL DIALYSIS (HCC): Primary | Chronic | ICD-10-CM

## 2018-01-19 DIAGNOSIS — Z99.2 ESRD ON PERITONEAL DIALYSIS (HCC): Primary | Chronic | ICD-10-CM

## 2018-01-19 LAB
ANION GAP BLD CALC-SCNC: 14 MMOL/L (ref 5–15)
BUN BLD-MCNC: 24 MG/DL (ref 9–20)
CA-I BLD-MCNC: 1.14 MMOL/L (ref 1.12–1.32)
CHLORIDE BLD-SCNC: 96 MMOL/L (ref 98–107)
CO2 BLD-SCNC: 33 MMOL/L (ref 21–32)
CREAT BLD-MCNC: 5.5 MG/DL (ref 0.6–1.3)
GLUCOSE BLD-MCNC: 79 MG/DL (ref 65–100)
HCG SERPL QL: NEGATIVE
HCT VFR BLD CALC: 23 % (ref 35–47)
HGB BLD-MCNC: 7.8 GM/DL (ref 11.5–16)
POTASSIUM BLD-SCNC: 4.4 MMOL/L (ref 3.5–5.1)
SERVICE CMNT-IMP: ABNORMAL
SODIUM BLD-SCNC: 137 MMOL/L (ref 136–145)

## 2018-01-19 PROCEDURE — 77030002924 HC SUT GORTX WLGO -B

## 2018-01-19 PROCEDURE — 74011250636 HC RX REV CODE- 250/636

## 2018-01-19 PROCEDURE — 77030018836 HC SOL IRR NACL ICUM -A

## 2018-01-19 PROCEDURE — 77030011640 HC PAD GRND REM COVD -A

## 2018-01-19 PROCEDURE — 76010000149 HC OR TIME 1 TO 1.5 HR

## 2018-01-19 PROCEDURE — 84703 CHORIONIC GONADOTROPIN ASSAY: CPT

## 2018-01-19 PROCEDURE — 76210000021 HC REC RM PH II 0.5 TO 1 HR

## 2018-01-19 PROCEDURE — 77030013140 HC MSK NEB VYRM -A

## 2018-01-19 PROCEDURE — 74011250637 HC RX REV CODE- 250/637

## 2018-01-19 PROCEDURE — 77030010507 HC ADH SKN DERMBND J&J -B

## 2018-01-19 PROCEDURE — 77030014647 HC SEAL FBRN TISSL BAXT -D

## 2018-01-19 PROCEDURE — 36415 COLL VENOUS BLD VENIPUNCTURE: CPT

## 2018-01-19 PROCEDURE — 77030013079 HC BLNKT BAIR HGGR 3M -A: Performed by: ANESTHESIOLOGY

## 2018-01-19 PROCEDURE — 74011000250 HC RX REV CODE- 250: Performed by: ANESTHESIOLOGY

## 2018-01-19 PROCEDURE — 80047 BASIC METABLC PNL IONIZED CA: CPT

## 2018-01-19 PROCEDURE — 77030002987 HC SUT PROL J&J -B

## 2018-01-19 PROCEDURE — 77030032490 HC SLV COMPR SCD KNE COVD -B

## 2018-01-19 PROCEDURE — 76060000033 HC ANESTHESIA 1 TO 1.5 HR

## 2018-01-19 PROCEDURE — 77030031139 HC SUT VCRL2 J&J -A

## 2018-01-19 PROCEDURE — 74011250636 HC RX REV CODE- 250/636: Performed by: ANESTHESIOLOGY

## 2018-01-19 PROCEDURE — 77030002916 HC SUT ETHLN J&J -A

## 2018-01-19 PROCEDURE — 74011000250 HC RX REV CODE- 250

## 2018-01-19 PROCEDURE — 77030002996 HC SUT SLK J&J -A

## 2018-01-19 PROCEDURE — C1768 GRAFT, VASCULAR: HCPCS

## 2018-01-19 PROCEDURE — 77030020782 HC GWN BAIR PAWS FLX 3M -B

## 2018-01-19 PROCEDURE — 77030002933 HC SUT MCRYL J&J -A

## 2018-01-19 DEVICE — VG THIN WALL 7MM X 40CM LINED
Type: IMPLANTABLE DEVICE | Site: ARM | Status: FUNCTIONAL
Brand: GORE-TEX   VASCULAR GRAFT

## 2018-01-19 RX ORDER — SODIUM CHLORIDE, SODIUM LACTATE, POTASSIUM CHLORIDE, CALCIUM CHLORIDE 600; 310; 30; 20 MG/100ML; MG/100ML; MG/100ML; MG/100ML
100 INJECTION, SOLUTION INTRAVENOUS CONTINUOUS
Status: DISCONTINUED | OUTPATIENT
Start: 2018-01-19 | End: 2018-01-19 | Stop reason: HOSPADM

## 2018-01-19 RX ORDER — ALBUTEROL SULFATE 0.83 MG/ML
2.5 SOLUTION RESPIRATORY (INHALATION)
Status: COMPLETED | OUTPATIENT
Start: 2018-01-19 | End: 2018-01-19

## 2018-01-19 RX ORDER — PROPOFOL 10 MG/ML
INJECTION, EMULSION INTRAVENOUS
Status: DISCONTINUED | OUTPATIENT
Start: 2018-01-19 | End: 2018-01-19 | Stop reason: HOSPADM

## 2018-01-19 RX ORDER — SODIUM CHLORIDE 0.9 % (FLUSH) 0.9 %
5-10 SYRINGE (ML) INJECTION AS NEEDED
Status: DISCONTINUED | OUTPATIENT
Start: 2018-01-19 | End: 2018-01-19 | Stop reason: HOSPADM

## 2018-01-19 RX ORDER — HYDROMORPHONE HYDROCHLORIDE 2 MG/ML
.25-1 INJECTION, SOLUTION INTRAMUSCULAR; INTRAVENOUS; SUBCUTANEOUS
Status: DISCONTINUED | OUTPATIENT
Start: 2018-01-19 | End: 2018-01-19 | Stop reason: HOSPADM

## 2018-01-19 RX ORDER — SODIUM CHLORIDE 0.9 % (FLUSH) 0.9 %
5-10 SYRINGE (ML) INJECTION EVERY 8 HOURS
Status: DISCONTINUED | OUTPATIENT
Start: 2018-01-19 | End: 2018-01-19 | Stop reason: HOSPADM

## 2018-01-19 RX ORDER — PROPOFOL 10 MG/ML
INJECTION, EMULSION INTRAVENOUS AS NEEDED
Status: DISCONTINUED | OUTPATIENT
Start: 2018-01-19 | End: 2018-01-19 | Stop reason: HOSPADM

## 2018-01-19 RX ORDER — FENTANYL CITRATE 50 UG/ML
INJECTION, SOLUTION INTRAMUSCULAR; INTRAVENOUS AS NEEDED
Status: DISCONTINUED | OUTPATIENT
Start: 2018-01-19 | End: 2018-01-19 | Stop reason: HOSPADM

## 2018-01-19 RX ORDER — MIDAZOLAM HYDROCHLORIDE 1 MG/ML
INJECTION, SOLUTION INTRAMUSCULAR; INTRAVENOUS AS NEEDED
Status: DISCONTINUED | OUTPATIENT
Start: 2018-01-19 | End: 2018-01-19 | Stop reason: HOSPADM

## 2018-01-19 RX ORDER — SODIUM CHLORIDE 9 MG/ML
25 INJECTION, SOLUTION INTRAVENOUS CONTINUOUS
Status: DISCONTINUED | OUTPATIENT
Start: 2018-01-19 | End: 2018-01-19 | Stop reason: HOSPADM

## 2018-01-19 RX ORDER — HYDROCODONE BITARTRATE AND ACETAMINOPHEN 5; 325 MG/1; MG/1
2 TABLET ORAL
Qty: 40 TAB | Refills: 0 | Status: SHIPPED | OUTPATIENT
Start: 2018-01-19 | End: 2018-01-29

## 2018-01-19 RX ORDER — HEPARIN SODIUM 1000 [USP'U]/ML
INJECTION, SOLUTION INTRAVENOUS; SUBCUTANEOUS AS NEEDED
Status: DISCONTINUED | OUTPATIENT
Start: 2018-01-19 | End: 2018-01-19 | Stop reason: HOSPADM

## 2018-01-19 RX ORDER — CEFAZOLIN SODIUM IN 0.9 % NACL 2 G/50 ML
2 INTRAVENOUS SOLUTION, PIGGYBACK (ML) INTRAVENOUS ONCE
Status: COMPLETED | OUTPATIENT
Start: 2018-01-19 | End: 2018-01-19

## 2018-01-19 RX ORDER — HYDROCODONE BITARTRATE AND ACETAMINOPHEN 5; 325 MG/1; MG/1
1 TABLET ORAL ONCE
Status: COMPLETED | OUTPATIENT
Start: 2018-01-19 | End: 2018-01-19

## 2018-01-19 RX ORDER — LIDOCAINE HYDROCHLORIDE 10 MG/ML
0.1 INJECTION, SOLUTION EPIDURAL; INFILTRATION; INTRACAUDAL; PERINEURAL AS NEEDED
Status: DISCONTINUED | OUTPATIENT
Start: 2018-01-19 | End: 2018-01-19 | Stop reason: HOSPADM

## 2018-01-19 RX ADMIN — PROPOFOL 30 MG: 10 INJECTION, EMULSION INTRAVENOUS at 13:52

## 2018-01-19 RX ADMIN — MIDAZOLAM HYDROCHLORIDE 1 MG: 1 INJECTION, SOLUTION INTRAMUSCULAR; INTRAVENOUS at 13:07

## 2018-01-19 RX ADMIN — SODIUM CHLORIDE 25 ML/HR: 900 INJECTION, SOLUTION INTRAVENOUS at 12:28

## 2018-01-19 RX ADMIN — FENTANYL CITRATE 25 MCG: 50 INJECTION, SOLUTION INTRAMUSCULAR; INTRAVENOUS at 13:27

## 2018-01-19 RX ADMIN — HYDROCODONE BITARTRATE AND ACETAMINOPHEN 1 TABLET: 5; 325 TABLET ORAL at 15:30

## 2018-01-19 RX ADMIN — HYDROMORPHONE HYDROCHLORIDE 0.5 MG: 2 INJECTION INTRAMUSCULAR; INTRAVENOUS; SUBCUTANEOUS at 14:46

## 2018-01-19 RX ADMIN — MIDAZOLAM HYDROCHLORIDE 1 MG: 1 INJECTION, SOLUTION INTRAMUSCULAR; INTRAVENOUS at 13:13

## 2018-01-19 RX ADMIN — PROPOFOL 50 MG: 10 INJECTION, EMULSION INTRAVENOUS at 13:50

## 2018-01-19 RX ADMIN — HYDROMORPHONE HYDROCHLORIDE 0.5 MG: 2 INJECTION INTRAMUSCULAR; INTRAVENOUS; SUBCUTANEOUS at 14:56

## 2018-01-19 RX ADMIN — HEPARIN SODIUM 3000 UNITS: 1000 INJECTION, SOLUTION INTRAVENOUS; SUBCUTANEOUS at 13:34

## 2018-01-19 RX ADMIN — PROPOFOL 30 MG: 10 INJECTION, EMULSION INTRAVENOUS at 13:11

## 2018-01-19 RX ADMIN — CEFAZOLIN 2 G: 1 INJECTION, POWDER, FOR SOLUTION INTRAMUSCULAR; INTRAVENOUS; PARENTERAL at 13:02

## 2018-01-19 RX ADMIN — MIDAZOLAM HYDROCHLORIDE 1 MG: 1 INJECTION, SOLUTION INTRAMUSCULAR; INTRAVENOUS at 13:02

## 2018-01-19 RX ADMIN — PROPOFOL 30 MG: 10 INJECTION, EMULSION INTRAVENOUS at 13:25

## 2018-01-19 RX ADMIN — PROPOFOL 75 MCG/KG/MIN: 10 INJECTION, EMULSION INTRAVENOUS at 13:11

## 2018-01-19 RX ADMIN — ALBUTEROL SULFATE 2.5 MG: 2.5 SOLUTION RESPIRATORY (INHALATION) at 12:49

## 2018-01-19 RX ADMIN — PROPOFOL 40 MG: 10 INJECTION, EMULSION INTRAVENOUS at 13:27

## 2018-01-19 NOTE — OP NOTES
[address] REPORT    Noe Paulson  MR#: [id number]  : 1985  ACCOUNT #: [de-identified]   DATE OF SERVICE: 2018    PREOPERATIVE DIAGNOSIS:  End-stage renal disease. POSTOPERATIVE DIAGNOSIS:  End-stage renal disease. PROCEDURE:  Right upper arm AV graft. SURGEON:  Mesfin Hobson MD    ASSISTANT:  0.    ANESTHESIA:  IV sedation, local anesthesia. IMPLANTS:  0.    INDICATION FOR PROCEDURE:  The patient is a young female with end-stage renal disease, decision was made to place an AV graft for dialysis. TECHNICAL DETAILS:  The patient taken to the operating room and suitable level of anesthesia was induced, prepped and draped in typical sterile fashion. A longitudinal incision was made in the right axilla and dissection carried out down to the axillary artery and axillary vein, both of which were dissected free of their soft tissue attachments. The patient systemically heparinized. A 7 mm Lisle-Otoniel graft was sewed end-to-side to the axillary artery. It was then tunneled such that the arterial side was lateral via 2 counter incisions in the lower arm. The graft was then sewed end-to-side to the axillary vein. Flow restored through the graft. There was excellent thrill. Wounds were irrigated thoroughly and closed in multiple layers. She tolerated the procedure well. Sponge and instrument counts were correct x2. She was awakened and transferred to the recovery room in good condition. ESTIMATED BLOOD LOSS:  100 mL. COMPLICATIONS:  None. SPECIMENS REMOVED:  None.       MD DIPESH Zhou / Nilesh Officer  D: 2018 15:46     T: 2018 16:34  JOB #: [id number]

## 2018-01-19 NOTE — BRIEF OP NOTE
BRIEF OPERATIVE NOTE    Date of Procedure: 1/19/2018   Preoperative Diagnosis: ESRD  Postoperative Diagnosis: ESRD    Procedure(s):  CREATION OF ARTERIO VENOUS GRAFT RIGHT ARM  Surgeon(s) and Role:     * Gerri Benjamin MD - Primary         Assistant Staff:       Surgical Staff:  Circ-1: Thu Lisa RN  Scrub Tech-1: Brianda Yuen. Shahnaz  Surg Asst-1: Cristina Panda  Event Time In   Incision Start 1326   Incision Close 1430     Anesthesia: MAC   Estimated Blood Loss: 50  Specimens: * No specimens in log *   Findings: 0   Complications: 0  Implants:   Implant Name Type Inv.  Item Serial No.  Lot No. LRB No. Used Action   GRAFT VASC TW [id number] -- [employer]ET[employer] - [id number]   GRAFT VASC TW [id number] -- Reena  [id number]  [employer] N/A Right 1 Implanted

## 2018-01-19 NOTE — ANESTHESIA PREPROCEDURE EVALUATION
Anesthetic History               Review of Systems / Medical History  Patient summary reviewed, nursing notes reviewed and pertinent labs reviewed    Pulmonary            Asthma : well controlled       Neuro/Psych              Cardiovascular    Hypertension: well controlled      CHF        Exercise tolerance: >4 METS     GI/Hepatic/Renal     GERD: well controlled    Renal disease: ESRD      Comments: Dialysis T/R/S Endo/Other        Arthritis and anemia     Other Findings   Comments: SLE           Physical Exam    Airway  Mallampati: III    Neck ROM: normal range of motion   Mouth opening: Diminished (comment)    Comments: Short mouth opening Cardiovascular    Rhythm: regular  Rate: normal         Dental  No notable dental hx       Pulmonary  Breath sounds clear to auscultation               Abdominal         Other Findings            Anesthetic Plan    ASA: 3  Anesthesia type: MAC            Anesthetic plan and risks discussed with: Patient and Family      Informed consent obtained.

## 2018-01-19 NOTE — ANESTHESIA POSTPROCEDURE EVALUATION
Post-Anesthesia Evaluation and Assessment    Patient: Melanie Mabry MRN: [id number]  SSN: [id number]    YOB: 1985  Age: 33 y.o. Sex: female       Cardiovascular Function/Vital Signs  Visit Vitals    BP (!) 126/92    Pulse 99    Temp 37.1 °C (98.7 °F)    Resp 22    Ht 5' 5\" (1.651 m)    Wt 66.5 kg (146 lb 9.7 oz)    SpO2 96%    BMI 24.4 kg/m2       Patient is status post MAC anesthesia for Procedure(s):  CREATION OF ARTERIO VENOUS GRAFT RIGHT ARM. Nausea/Vomiting: None    Postoperative hydration reviewed and adequate. Pain:  Pain Scale 1: Numeric (0 - 10) (01/19/18 1447)  Pain Intensity 1: 4 (01/19/18 1447)   Managed    Neurological Status:   Neuro (WDL): Exceptions to WDL (01/19/18 1442)  Neuro  RUE Motor Response: Purposeful;Numbness (upper arm from procedure) (01/19/18 1442)   At baseline    Mental Status and Level of Consciousness: Alert and oriented     Pulmonary Status:   O2 Device: Nasal cannula (01/19/18 1442)   Adequate oxygenation and airway patent    Complications related to anesthesia: None    Post-anesthesia assessment completed.  No concerns    Signed By: Sweta Richards DO     January 19, 2018

## 2018-01-19 NOTE — IP AVS SNAPSHOT
[address] 
[phone] Patient: Arturo mSart MRN: [id number] :1985 About your hospitalization You were admitted on:  2018 You last received care in the:  [facility] PACU You were discharged on:  2018 Why you were hospitalized Your primary diagnosis was:  Not on File Follow-up Information Follow up With Details Comments Contact Info Rich Jeffers NP   [address] [facility] [address] [place] [address] 
[phone] Your Scheduled Appointments 2018  2:00 PM EST Follow Up with Merlin Nielsen, MD  
[facility] [address] [facility] [address] [facility] [address] [phone] Discharge Orders None A check fahad indicates which time of day the medication should be taken. My Medications START taking these medications Instructions Each Dose to Equal  
 Morning Noon Evening Bedtime HYDROcodone-acetaminophen 5-325 mg per tablet Commonly known as:  Brianna Barillas Your last dose was: Your next dose is: Take 2 Tabs by mouth every four (4) hours as needed for Pain. Max Daily Amount: 12 Tabs. 2 Tab CHANGE how you take these medications Instructions Each Dose to Equal  
 Morning Noon Evening Bedtime  
 oxyCODONE IR 5 mg immediate release tablet Commonly known as:  Natalia Snellen What changed:   
- how much to take 
- how to take this - when to take this 
- reasons to take this 
- additional instructions Your last dose was: Your next dose is: Take 1 tab in AM and HALF to 1 tab in PM if needed on days where back pain is severe CONTINUE taking these medications Instructions Each Dose to Equal  
 Morning Noon Evening Bedtime albuterol 90 mcg/actuation inhaler Commonly known as:  PROVENTIL HFA, VENTOLIN HFA, PROAIR HFA Your last dose was: Your next dose is: Take 1-2 Puffs by inhalation every four (4) hours as needed for Wheezing or Shortness of Breath. 1-2 Puff  
    
   
   
   
  
 allopurinol 100 mg tablet Commonly known as:  Karthik Merchanton Your last dose was: Your next dose is: Take 100 mg by mouth daily (after breakfast). Needs to TAKE on a full stomach; will cause her to be nauseated. 100 mg  
    
   
   
   
  
 apixaban 5 mg tablet Commonly known as:  Rizwan Po Your last dose was: Your next dose is: Take 5 mg by mouth. 5 mg  
    
   
   
   
  
 azaTHIOprine 50 mg tablet Commonly known as:  [employer] Your last dose was: Your next dose is: Take 50 mg by mouth daily (after breakfast). 50 mg COREG 6.25 mg tablet Generic drug:  carvedilol Your last dose was: Your next dose is: Take 12.5 mg by mouth two (2) times daily (with meals). 12.5 mg  
    
   
   
   
  
 cyanocobalamin 2,500 mcg sublingual tablet Commonly known as:  VITAMIN B-12 Your last dose was: Your next dose is: Take 1 Tab by mouth daily. 2500 mcg  
    
   
   
   
  
 fluticasone-vilanterol 200-25 mcg/dose inhaler Commonly known as:  BREO ELLIPTA Your last dose was: Your next dose is: Take 1 Puff by inhalation daily. Rinse mouth out after use 1 Puff  
    
   
   
   
  
 gemfibrozil 600 mg tablet Commonly known as:  LOPID Your last dose was: Your next dose is: Take 300 mg by mouth daily. 300 mg  
    
   
   
   
  
 pantoprazole 40 mg tablet Commonly known as:  PROTONIX Your last dose was: Your next dose is: Take 1 Tab by mouth Daily (before breakfast).   
 40 mg  
    
   
 PLAQUENIL 200 mg tablet Generic drug:  hydroxychloroquine Your last dose was: Your next dose is: Take 200 mg by mouth two (2) times a day. 200 mg  
    
   
   
   
  
 predniSONE 5 mg tablet Commonly known as:  Areli Savers Your last dose was: Your next dose is: Take 5 mg by mouth daily. 5 mg Senna 8.6 mg tablet Generic drug:  senna Your last dose was: Your next dose is: Take 1 Tab by mouth daily as needed. 1 Tab Where to Get Your Medications Information on where to get these meds will be given to you by the nurse or doctor. ! Ask your nurse or doctor about these medications HYDROcodone-acetaminophen 5-325 mg per tablet Discharge Instructions Patient Discharge Instructions Irena Lofton / [id number] : 1985 Admitted 2018 Discharged: 2018 Take Home Medications · It is important that you take the medication exactly as they are prescribed. · Keep your medication in the bottles provided by the pharmacist and keep a list of the medication names, dosages, and times to be taken in your wallet. · Do not take other medications without consulting your doctor. What to do at [facility] Recommended diet: Renal Diet, Recommended activity: Activity as tolerated, Follow-up with Dr Yuri Bullock in 2 weeks Information obtained by : 
I understand that if any problems occur once I am at home I am to contact my physician. DISCHARGE SUMMARY from Nurse PATIENT INSTRUCTIONS: 
 
 
F-face looks uneven A-arms unable to move or move unevenly S-speech slurred or non-existent T-time-call 911 as soon as signs and symptoms begin-DO NOT go  
 Back to bed or wait to see if you get better-TIME IS BRAIN. Warning Signs of HEART ATTACK Call 911 if you have these symptoms: 
? Chest discomfort. Most heart attacks involve discomfort in the center of the chest that lasts more than a few minutes, or that goes away and comes back. It can feel like uncomfortable pressure, squeezing, fullness, or pain. ? Discomfort in other areas of the upper body. Symptoms can include pain or discomfort in one or both arms, the back, neck, jaw, or stomach. ? Shortness of breath with or without chest discomfort. ? Other signs may include breaking out in a cold sweat, nausea, or lightheadedness. Don't wait more than five minutes to call [address]! Fast action can save your life. Calling 911 is almost always the fastest way to get lifesaving treatment. Emergency Medical Services staff can begin treatment when they arrive  up to an hour sooner than if someone gets to the hospital by car. The discharge information has been reviewed with the parent. The parent verbalized understanding. Discharge medications reviewed with the family and appropriate educational materials and side effects teaching were provided. ___________________________________________________________________________________________________________________________________ ACO Transitions of Care Introducing [employer] Ekaterina Krishnamurthy offers a voluntary care coordination program to provide high quality service and care to [facility] fee-for-service beneficiaries. Luzma Thurston was designed to help you enhance your health and well-being through the following services: ? Transitions of Care  support for individuals who are transitioning from one care setting to another (example: Hospital to home). ?  Chronic and Complex Care Coordination  support for individuals and caregivers of those with serious or chronic illnesses or with more than one chronic (ongoing) condition and those who take a number of different medications. If you meet specific medical criteria, a [facility] [address] may call you directly to coordinate your care with your primary care physician and your other care providers. For questions about the [facility] programs, please, contact your physicians office. For general questions or additional information about Accountable Care Organizations: 
Please visit www.medicare.gov/acos. html or call 1-800-MEDICARE (4-107.163.8840) TTY users should call 5-184.955.5585. Introducing [facility]! Smiley Shafer introduces [employer] patient portal. Now you can access parts of your medical record, email your doctor's office, and request medication refills online. 1. In your internet browser, go to [email or web address] 2. Click on the First Time User? Click Here link in the Sign In box. You will see the New Member Sign Up page. 3. Enter your [employer] Access Code exactly as it appears below. You will not need to use this code after youve completed the sign-up process. If you do not sign up before the expiration date, you must request a new code. · [employer] Access Code: [email or web address] Expires: 2/26/2018 12:44 PM 
 
4. Enter the last four digits of your Social Security Number (xxxx) and Date of Birth (mm/dd/yyyy) as indicated and click Submit. You will be taken to the next sign-up page. 5. Create a [employer] ID. This will be your [employer] login ID and cannot be changed, so think of one that is secure and easy to remember. 6. Create a [employer] password. You can change your password at any time. 7. Enter your Password Reset Question and Answer. This can be used at a later time if you forget your password. 8. Enter your e-mail address.  You will receive e-mail notification when new information is available in [employer]. 9. Click Sign Up. You can now view and download portions of your medical record. 10. Click the Download Summary menu link to download a portable copy of your medical information. If you have questions, please visit the Frequently Asked Questions section of the [employer] website. Remember, [employer] is NOT to be used for urgent needs. For medical emergencies, dial 911. Now available from your iPhone and Android! Providers Seen During Your Hospitalization Provider Specialty Primary office phone Grover Presley MD General and Vascular Surgery [phone] Your Primary Care Physician (PCP) Primary Care Physician Office Phone Office Fax Francisca Danny [phone] You are allergic to the following Allergen Reactions Compazine (Prochlorperazine Edisylate) Nausea and Vomiting Palpitations Recent Documentation Height Weight BMI OB Status Smoking Status 1.651 m 66.5 kg 24.4 kg/m2 Medically Induced Never Smoker Emergency Contacts Name Discharge Info Relation Home Work Mobile [place] [address] CAREGIVER [3] Mother [14] [phone] [address] CAREGIVER [3] Father [15] [phone] Patient Belongings The following personal items are in your possession at time of discharge: 
  Dental Appliances: None  Visual Aid: None   Hearing Aids/Status: Does not own  Home Medications: None   Jewelry: Bracelet (given to family)  Clothing: Pants, Undergarments, Shirt, Footwear, Socks (placed in locker)    Other Valuables: Purse (given to family) Please provide this summary of care documentation to your next provider. Signatures-by signing, you are acknowledging that this After Visit Summary has been reviewed with you and you have received a copy.   
  
 
  
    
    
 Patient Signature: ____________________________________________________________ Date:  ____________________________________________________________  
  
Hurshel Och Provider Signature:  ____________________________________________________________ Date:  ____________________________________________________________

## 2018-01-19 NOTE — PERIOP NOTES
10 ml Floseal provided to sterile field to be used PRN by MD  Ref: [id number]  Lot: [id number]  Exp: 2019-03-31

## 2018-01-19 NOTE — DISCHARGE INSTRUCTIONS
Patient Discharge Instructions    Jacqueline Chaudhry / [id number] : 1985    Admitted 2018 Discharged: 2018     Take Home Medications            · It is important that you take the medication exactly as they are prescribed. · Keep your medication in the bottles provided by the pharmacist and keep a list of the medication names, dosages, and times to be taken in your wallet. · Do not take other medications without consulting your doctor. What to do at Home    Recommended diet: Renal Diet,     Recommended activity: Activity as tolerated,      Follow-up with Dr Mahesh Fong in 2 weeks        Information obtained by :  I understand that if any problems occur once I am at home I am to contact my physician. DISCHARGE SUMMARY from Nurse    PATIENT INSTRUCTIONS:    After general anesthesia or intravenous sedation, for 24 hours or while taking prescription Narcotics:  · Limit your activities  · Do not drive and operate hazardous machinery  · Do not make important personal or business decisions  · Do  not drink alcoholic beverages  · If you have not urinated within 8 hours after discharge, please contact your surgeon on call. Report the following to your surgeon:  · Excessive pain, swelling, redness or odor of or around the surgical area  · Temperature over 100.5  · Nausea and vomiting lasting longer than 4 hours or if unable to take medications  · Any signs of decreased circulation or nerve impairment to extremity: change in color, persistent  numbness, tingling, coldness or increase pain  · Any questions    What to do at Home:  Recommended activity: Activity as tolerated and no driving for today,     *  Please give a list of your current medications to your Primary Care Provider. *  Please update this list whenever your medications are discontinued, doses are      changed, or new medications (including over-the-counter products) are added.     *  Please carry medication information at all times in case of emergency situations. These are general instructions for a healthy lifestyle:    No smoking/ No tobacco products/ Avoid exposure to second hand smoke  Surgeon General's Warning:  Quitting smoking now greatly reduces serious risk to your health. Obesity, smoking, and sedentary lifestyle greatly increases your risk for illness    A healthy diet, regular physical exercise & weight monitoring are important for maintaining a healthy lifestyle    You may be retaining fluid if you have a history of heart failure or if you experience any of the following symptoms:  Weight gain of 3 pounds or more overnight or 5 pounds in a week, increased swelling in our hands or feet or shortness of breath while lying flat in bed. Please call your doctor as soon as you notice any of these symptoms; do not wait until your next office visit. Recognize signs and symptoms of STROKE:    F-face looks uneven    A-arms unable to move or move unevenly    S-speech slurred or non-existent    T-time-call 911 as soon as signs and symptoms begin-DO NOT go       Back to bed or wait to see if you get better-TIME IS BRAIN. Warning Signs of HEART ATTACK     Call 911 if you have these symptoms:   Chest discomfort. Most heart attacks involve discomfort in the center of the chest that lasts more than a few minutes, or that goes away and comes back. It can feel like uncomfortable pressure, squeezing, fullness, or pain.  Discomfort in other areas of the upper body. Symptoms can include pain or discomfort in one or both arms, the back, neck, jaw, or stomach.  Shortness of breath with or without chest discomfort.  Other signs may include breaking out in a cold sweat, nausea, or lightheadedness. Don't wait more than five minutes to call 911 - MINUTES MATTER! Fast action can save your life. Calling 911 is almost always the fastest way to get lifesaving treatment.  Emergency Medical Services staff can begin treatment when they arrive -- up to an hour sooner than if someone gets to the hospital by car. The discharge information has been reviewed with the parent. The parent verbalized understanding. Discharge medications reviewed with the family and appropriate educational materials and side effects teaching were provided.   ___________________________________________________________________________________________________________________________________

## 2018-01-29 ENCOUNTER — OFFICE VISIT (OUTPATIENT)
Dept: NEUROLOGY | Age: 33
End: 2018-01-29

## 2018-01-29 VITALS
WEIGHT: 153 LBS | OXYGEN SATURATION: 95 % | HEIGHT: 65 IN | BODY MASS INDEX: 25.49 KG/M2 | HEART RATE: 56 BPM | SYSTOLIC BLOOD PRESSURE: 120 MMHG | DIASTOLIC BLOOD PRESSURE: 82 MMHG

## 2018-01-29 DIAGNOSIS — G89.29 CHRONIC BILATERAL LOW BACK PAIN WITHOUT SCIATICA: Primary | ICD-10-CM

## 2018-01-29 DIAGNOSIS — M54.50 CHRONIC BILATERAL LOW BACK PAIN WITHOUT SCIATICA: Primary | ICD-10-CM

## 2018-01-29 RX ORDER — OXYCODONE HYDROCHLORIDE 5 MG/1
TABLET ORAL
Qty: 40 TAB | Refills: 0 | Status: SHIPPED | OUTPATIENT
Start: 2018-01-29 | End: 2018-03-05 | Stop reason: SDUPTHER

## 2018-01-29 NOTE — PROGRESS NOTES
Pill count = 5 oxycodone 5 mg tablets        Pill count verified with patient. UDS obtained and sent =  Pain contract = on file   made available for MD review.

## 2018-01-29 NOTE — MR AVS SNAPSHOT
303 [facility] [address] [address] [phone] Patient: Karyn Green MRN: [id number] :1985 Visit Information Date & Time Provider Department Dept. Phone Encounter #  
 2018  2:00 PM MD Faith Marti Neurology [facility] [phone] [id number] Follow-up Instructions Return in about 4 weeks (around 2018). Upcoming Health Maintenance Date Due DTaP/Tdap/Td series (1 - Tdap) 2007 Pneumococcal 19-64 Highest Risk (2 of 3 - PCV13) 10/1/2010 PAP AKA CERVICAL CYTOLOGY 2019 Allergies as of 2018  Review Complete On: 2018 By: Jelly Bradley LPN Severity Noted Reaction Type Reactions Compazine [Prochlorperazine Edisylate] High 2016   Systemic Nausea and Vomiting, Palpitations Current Immunizations  Reviewed on 10/30/2017 Name Date Influenza Vaccine 2017, 2012 Influenza Vaccine Split 2011 ZZZ-RETIRED (DO NOT USE) Pneumococcal Vaccine (Unspecified Type) 10/1/2009 Not reviewed this visit You Were Diagnosed With   
  
 Codes Comments Chronic bilateral low back pain without sciatica    -  Primary ICD-10-CM: M54.5, G89.29 ICD-9-CM: 724.2, 338.29 Vitals BP Pulse Height(growth percentile) Weight(growth percentile) SpO2 BMI  
 120/82 (BP 1 Location: Left arm, BP Patient Position: Sitting) (!) 56 5' 5\" (1.651 m) 153 lb (69.4 kg) 95% 25.46 kg/m2 OB Status Smoking Status Medically Induced Never Smoker BMI and BSA Data Body Mass Index Body Surface Area  
 25.46 kg/m 2 1.78 m 2 Preferred Pharmacy Pharmacy Name Phone [address], [address] Your Updated Medication List  
  
   
This list is accurate as of: 18  2:33 PM.  Always use your most recent med list.  
  
  
  
  
 albuterol 90 mcg/actuation inhaler Commonly known as:  PROVENTIL HFA, VENTOLIN HFA, PROAIR HFA Take 1-2 Puffs by inhalation every four (4) hours as needed for Wheezing or Shortness of Breath. allopurinol 100 mg tablet Commonly known as:  Rocky Belle Take 100 mg by mouth daily (after breakfast). Needs to TAKE on a full stomach; will cause her to be nauseated. apixaban 5 mg tablet Commonly known as:  Violet Estimable Take 5 mg by mouth. azaTHIOprine 50 mg tablet Commonly known as:  The Pepsi Take 50 mg by mouth daily (after breakfast). COREG 6.25 mg tablet Generic drug:  carvedilol Take 12.5 mg by mouth two (2) times daily (with meals). cyanocobalamin 2,500 mcg sublingual tablet Commonly known as:  VITAMIN B-12 Take 1 Tab by mouth daily. fluticasone-vilanterol 200-25 mcg/dose inhaler Commonly known as:  BREO ELLIPTA Take 1 Puff by inhalation daily. Rinse mouth out after use  
  
 gemfibrozil 600 mg tablet Commonly known as:  LOPID Take 300 mg by mouth daily. oxyCODONE IR 5 mg immediate release tablet Commonly known as:  Verneta Jaegers Take 1 tab in AM and HALF to 1 tab in PM if needed on days where back pain is severe  
  
 pantoprazole 40 mg tablet Commonly known as:  PROTONIX Take 1 Tab by mouth Daily (before breakfast). PLAQUENIL 200 mg tablet Generic drug:  hydroxychloroquine Take 200 mg by mouth two (2) times a day. predniSONE 5 mg tablet Commonly known as:  Destiney Hare Take 5 mg by mouth daily. Senna 8.6 mg tablet Generic drug:  senna Take 1 Tab by mouth daily as needed. Prescriptions Printed Refills  
 oxyCODONE IR (ROXICODONE) 5 mg immediate release tablet 0 Sig: Take 1 tab in AM and HALF to 1 tab in PM if needed on days where back pain is severe   
 Class: Print Follow-up Instructions Return in about 4 weeks (around 2/26/2018). Introducing [facility]! Otto Pederson introduces Downtown patient portal. Now you can access parts of your medical record, email your doctor's office, and request medication refills online. 1. In your internet browser, go to [email or web address] 2. Click on the First Time User? Click Here link in the Sign In box. You will see the New Member Sign Up page. 3. Enter your Downtown Access Code exactly as it appears below. You will not need to use this code after youve completed the sign-up process. If you do not sign up before the expiration date, you must request a new code. · Downtown Access Code: [email or web address] Expires: 2/26/2018 12:44 PM 
 
4. Enter the last four digits of your Social Security Number (xxxx) and Date of Birth (mm/dd/yyyy) as indicated and click Submit. You will be taken to the next sign-up page. 5. Create a Downtown ID. This will be your Downtown login ID and cannot be changed, so think of one that is secure and easy to remember. 6. Create a Downtown password. You can change your password at any time. 7. Enter your Password Reset Question and Answer. This can be used at a later time if you forget your password. 8. Enter your e-mail address. You will receive e-mail notification when new information is available in [address] 9. Click Sign Up. You can now view and download portions of your medical record. 10. Click the Download Summary menu link to download a portable copy of your medical information. If you have questions, please visit the Frequently Asked Questions section of the Downtown website. Remember, Downtown is NOT to be used for urgent needs. For medical emergencies, dial 911. Now available from your iPhone and Android! Please provide this summary of care documentation to your next provider. Your primary care clinician is listed as Amy Tadeo. If you have any questions after today's visit, please call [phone].

## 2018-01-29 NOTE — PROGRESS NOTES
Interval HPI:     This is a 33 y.o. female who is following up for     PMHx: chronic back pain, hx of fibromyalgia, hx of DVT/ coumadin, hx of Lupus, mild lower lumbar disc degeneration (L4-5 and L5-S1 with small annular tear at L5-S1)    Chief Complaint   Patient presents with    Pain (Chronic)         Did drug screen (blood draw) last visit; returned insufficient sample to run opiate testing. Reviewed : filled Rx from me of Oxycodone 5 mg IR tablets (#40) on 1-3-18 and Hydrocodone 5/ 325 (#40 tabs) from Dr Lashae [facility] on 1-20-18. She believes she has about 30 out of the 40 Hydrocodone tablets left over. Continues to have moderate to severe lower back pain. Rates average daily pain as 3-6/ 10 with her medications, some days up to 7-8/ 10. Reports that she takes one Oxycodone IR 5 mg tab when she wakes up and maybe 1-2 days a week will take an extra tablet. Says the Hydrocodone helped with her right arm pain (post-right AV graft for dialysis) but was too strong, made her drowsy, didn't take oxycodone while taking hydrocodone. Tried/ failed: Gabapentin, Cymbalta, Amitriptyline (only helped get to sleep), Lyrica (abnormal behaviors)    Reviewed : only filling pain Rx from my office, no aberrant behavior. Last filled Oxycodone on 1-3-18 (#40 tabs)  Pill count: 5 Oxycodone IR 5 mg tablets left    UDS Hx:  March 2017: consistent with Rx pain medication  9-13-17 UDS: only screened for codeine or morphine, which pt doesn't take      Brief ROS: as above  There have been no significant changes in PMHx, PSHx, SHx except as noted above.      Allergies   Allergen Reactions    Compazine [Prochlorperazine Edisylate] Nausea and Vomiting and Palpitations     Current Outpatient Prescriptions   Medication Sig Dispense Refill    oxyCODONE IR (ROXICODONE) 5 mg immediate release tablet Take 1 tab in AM and HALF to 1 tab in PM if needed on days where back pain is severe  40 Tab 0    predniSONE (DELTASONE) 5 mg tablet Take 5 mg by mouth daily.  fluticasone-vilanterol (BREO ELLIPTA) 200-25 mcg/dose inhaler Take 1 Puff by inhalation daily. Rinse mouth out after use 1 Inhaler 5    gemfibrozil (LOPID) 600 mg tablet Take 300 mg by mouth daily.  carvedilol (COREG) 6.25 mg tablet Take 12.5 mg by mouth two (2) times daily (with meals).  apixaban (ELIQUIS) 5 mg tablet Take 5 mg by mouth.  azaTHIOprine (IMURAN) 50 mg tablet Take 50 mg by mouth daily (after breakfast).  albuterol (PROVENTIL HFA, VENTOLIN HFA, PROAIR HFA) 90 mcg/actuation inhaler Take 1-2 Puffs by inhalation every four (4) hours as needed for Wheezing or Shortness of Breath. 1 Inhaler 2    hydroxychloroquine (PLAQUENIL) 200 mg tablet Take 200 mg by mouth two (2) times a day.  allopurinol (ZYLOPRIM) 100 mg tablet Take 100 mg by mouth daily (after breakfast). Needs to TAKE on a full stomach; will cause her to be nauseated.  cyanocobalamin (VITAMIN B-12) 2,500 mcg sublingual tablet Take 1 Tab by mouth daily. 90 Tab 1    pantoprazole (PROTONIX) 40 mg tablet Take 1 Tab by mouth Daily (before breakfast). 30 Tab 0    senna (SENNA) 8.6 mg tablet Take 1 Tab by mouth daily as needed.          Physical Exam  Vitals:    01/29/18 1405   BP: 120/82   BP 1 Location: Left arm   BP Patient Position: Sitting   Pulse: (!) 56   SpO2: 95%   Weight: 69.4 kg (153 lb)   Height: 5' 5\" (1.651 m)       Resting, NAD, more alert today  Awake, alert and conversant   Ext: right arm swollen (area of AV grafting)    MSK:  Lumbar spine:  + tender across lower back  No pain with back flexion  Mild pain with back extension and facet loading to right/ left      Focused Neurological Exam     Mental status:   Alert and oriented to person, place situation  Mood appears stable  Normal thought processes    CNs: EOMI, Face symmetric, Hearing/ Language normal  Sensory: intact light touch in both legs  Motor: 4/ 5 strength in arms and legs Reflexes: not tested  Gait: antalgic    Impression    ICD-10-CM ICD-9-CM    1. Chronic bilateral low back pain without sciatica M54.5 724.2 oxyCODONE IR (ROXICODONE) 5 mg immediate release tablet    G89.29 338.29         Renewed Rx Oxycodone IR 5 mg tablet (#40 tabs) to take 1 tab in AM and occasionally HALF to one tab in evenings if pain is severe. Advised pt to take the remaining Hydrocodone to her pharmacy and ask them to dispose of it as she says it's too strong for her and she doesn't plan to use it. Drug Screen testing (blood draw) was inconclusive as noted above. Office will try to get mouth swab drug screen kit as pt is ESRD/ anuric and blood draw was inconclusive.

## 2018-03-05 ENCOUNTER — OFFICE VISIT (OUTPATIENT)
Dept: NEUROLOGY | Age: 33
End: 2018-03-05

## 2018-03-05 VITALS
WEIGHT: 150 LBS | HEIGHT: 65 IN | SYSTOLIC BLOOD PRESSURE: 124 MMHG | BODY MASS INDEX: 24.99 KG/M2 | DIASTOLIC BLOOD PRESSURE: 92 MMHG

## 2018-03-05 DIAGNOSIS — M54.50 CHRONIC BILATERAL LOW BACK PAIN WITHOUT SCIATICA: ICD-10-CM

## 2018-03-05 DIAGNOSIS — G89.29 CHRONIC BILATERAL LOW BACK PAIN WITHOUT SCIATICA: ICD-10-CM

## 2018-03-05 RX ORDER — AMITRIPTYLINE HYDROCHLORIDE 25 MG/1
25 TABLET, FILM COATED ORAL
Qty: 30 TAB | Refills: 5 | Status: SHIPPED | OUTPATIENT
Start: 2018-03-05 | End: 2018-05-12

## 2018-03-05 RX ORDER — OXYCODONE HYDROCHLORIDE 5 MG/1
TABLET ORAL
Qty: 40 TAB | Refills: 0 | Status: SHIPPED | OUTPATIENT
Start: 2018-03-05 | End: 2018-04-20

## 2018-03-05 NOTE — PATIENT INSTRUCTIONS
10 [facility]   Statement to Patients  April 1, 2014      In an effort to ensure the large volume of patient prescription refills is processed in the most efficient and expeditious manner, we are asking our patients to assist us by calling your Pharmacy for all prescription refills, this will include also your  Mail Order Pharmacy. The pharmacy will contact our office electronically to continue the refill process. Please do not wait until the last minute to call your pharmacy. We need at least 48 hours (2days) to fill prescriptions. We also encourage you to call your pharmacy before going to  your prescription to make sure it is ready. With regard to controlled substance prescription refill requests (narcotic refills) that need to be picked up at our office, we ask your cooperation by providing us with at least 72 hours (3days) notice that you will need a refill. We will not refill narcotic prescription refill requests after 4:00pm on any weekday, Monday through Thursday, or after 2:00pm on Fridays, or on the weekends. We encourage everyone to explore another way of getting your prescription refill request processed using Tomorrow, our patient web portal through our electronic medical record system. Tomorrow is an efficient and effective way to communicate your medication request directly to the office and  downloadable as an judith on your smart phone . Tomorrow also features a review functionality that allows you to view your medication list as well as leave messages for your physician. Are you ready to get connected? If so please review the attatched instructions or speak to any of our staff to get you set up right away! Thank you so much for your cooperation. Should you have any questions please contact our Practice Administrator.     The Physicians and Staff,  [facility]

## 2018-03-05 NOTE — MR AVS SNAPSHOT
303 [facility] 
 
 
 [place] [address] [place] [address] [phone] Patient: Tyler Manzanares MRN: [id number] :1985 Visit Information Date & Time Provider Department Dept. Phone Encounter #  
 3/5/2018 10:30 AM KHANH Wolfe Neurology [facility] [phone] [id number] Your Appointments 3/5/2018  2:40 PM  
ESTABLISHED PATIENT with Jelly Miller MD  
[address] ([address]) Appt Note: F/up  
 [address] [facility] [address]  
[phone]  
  
   
 [address] [facility] [address]  
  
    
 2018  1:00 PM  
Follow Up with Luther Tim MD  
[facility]) Appt Note: follow up pain mgt [place] [address] [place] [address] [phone]  
  
   
 [place] [address] [address]  [address] Upcoming Health Maintenance Date Due DTaP/Tdap/Td series (1 - Tdap) 2007 Pneumococcal 19-64 Highest Risk (2 of 3 - PCV13) 10/1/2010 PAP AKA CERVICAL CYTOLOGY 2019 Allergies as of 3/5/2018  Review Complete On: 3/5/2018 By: Vishal Trujillo Severity Noted Reaction Type Reactions Compazine [Prochlorperazine Edisylate] High 2016   Systemic Nausea and Vomiting, Palpitations Current Immunizations  Reviewed on 10/30/2017 Name Date Influenza Vaccine 2017, 2012 Influenza Vaccine Split 2011 ZZZ-RETIRED (DO NOT USE) Pneumococcal Vaccine (Unspecified Type) 10/1/2009 Not reviewed this visit You Were Diagnosed With   
  
 Codes Comments Chronic bilateral low back pain without sciatica     ICD-10-CM: M54.5, G89.29 ICD-9-CM: 724.2, 338.29 Vitals BP Height(growth percentile) Weight(growth percentile) BMI OB Status Smoking Status (!) 124/92 5' 5\" (1.651 m) 150 lb (68 kg) 24.96 kg/m2 Medically Induced Never Smoker BMI and BSA Data Body Mass Index Body Surface Area 24.96 kg/m 2 1.77 m 2 Preferred Pharmacy Pharmacy Name Phone [address], [address] Your Updated Medication List  
  
   
This list is accurate as of 3/5/18 10:58 AM.  Always use your most recent med list.  
  
  
  
  
 albuterol 90 mcg/actuation inhaler Commonly known as:  PROVENTIL HFA, VENTOLIN HFA, PROAIR HFA Take 1-2 Puffs by inhalation every four (4) hours as needed for Wheezing or Shortness of Breath. allopurinol 100 mg tablet Commonly known as:  Greer Jacobson Take 100 mg by mouth daily (after breakfast). Needs to TAKE on a full stomach; will cause her to be nauseated. amitriptyline 25 mg tablet Commonly known as:  ELAVIL Take 1 Tab by mouth nightly. PRN  
  
 apixaban 5 mg tablet Commonly known as:  Mihaela Tisha Take 5 mg by mouth. azaTHIOprine 50 mg tablet Commonly known as:  The Pepsi Take 50 mg by mouth daily (after breakfast). COREG 6.25 mg tablet Generic drug:  carvedilol Take 12.5 mg by mouth two (2) times daily (with meals). cyanocobalamin 2,500 mcg sublingual tablet Commonly known as:  VITAMIN B-12 Take 1 Tab by mouth daily. fluticasone-vilanterol 200-25 mcg/dose inhaler Commonly known as:  BREO ELLIPTA Take 1 Puff by inhalation daily. Rinse mouth out after use  
  
 gemfibrozil 600 mg tablet Commonly known as:  LOPID Take 300 mg by mouth daily. oxyCODONE IR 5 mg immediate release tablet Commonly known as:  Guadarrama Cam Take 1 tab in AM and HALF to 1 tab in PM if needed on days where back pain is severe  
  
 pantoprazole 40 mg tablet Commonly known as:  PROTONIX Take 1 Tab by mouth Daily (before breakfast). PLAQUENIL 200 mg tablet Generic drug:  hydroxychloroquine Take 200 mg by mouth two (2) times a day. predniSONE 5 mg tablet Commonly known as:  Lashell Garden Take 5 mg by mouth daily. Senna 8.6 mg tablet Generic drug:  senna Take 1 Tab by mouth daily as needed. Prescriptions Printed Refills  
 oxyCODONE IR (ROXICODONE) 5 mg immediate release tablet 0 Sig: Take 1 tab in AM and HALF to 1 tab in PM if needed on days where back pain is severe   
 Class: Print  
 amitriptyline (ELAVIL) 25 mg tablet 5 Sig: Take 1 Tab by mouth nightly. PRN Class: Print Route: Oral  
  
We Performed the Following 9+OXYCO+ALC-UNBUND, ORAL FLUID [id number] CPT(R)] Patient Instructions PRESCRIPTION REFILL POLICY [facility] Statement to Patients April 1, 2014 In an effort to ensure the large volume of patient prescription refills is processed in the most efficient and expeditious manner, we are asking our patients to assist us by calling your Pharmacy for all prescription refills, this will include also your  Mail Order Pharmacy. The pharmacy will contact our office electronically to continue the refill process. Please do not wait until the last minute to call your pharmacy. We need at least 48 hours (2days) to fill prescriptions. We also encourage you to call your pharmacy before going to  your prescription to make sure it is ready. With regard to controlled substance prescription refill requests (narcotic refills) that need to be picked up at our office, we ask your cooperation by providing us with at least 72 hours (3days) notice that you will need a refill. We will not refill narcotic prescription refill requests after 4:00pm on any weekday, Monday through Thursday, or after 2:00pm on Fridays, or on the weekends.   
  
We encourage everyone to explore another way of getting your prescription refill request processed using Room 77, our patient web portal through our electronic medical record system. Hookit is an efficient and effective way to communicate your medication request directly to the office and  downloadable as an judith on your smart phone . Hookit also features a review functionality that allows you to view your medication list as well as leave messages for your physician. Are you ready to get connected? If so please review the attatched instructions or speak to any of our staff to get you set up right away! Thank you so much for your cooperation. Should you have any questions please contact our Practice Administrator. The Physicians and Staff,  [facility] Introducing [facility]! [facility] introduces Hookit patient portal. Now you can access parts of your medical record, email your doctor's office, and request medication refills online. 1. In your internet browser, go to [email or web address] 2. Click on the First Time User? Click Here link in the Sign In box. You will see the New Member Sign Up page. 3. Enter your Hookit Access Code exactly as it appears below. You will not need to use this code after youve completed the sign-up process. If you do not sign up before the expiration date, you must request a new code. · Hookit Access Code: [email or web address] Expires: 6/3/2018 10:58 AM 
 
4. Enter the last four digits of your Social Security Number (xxxx) and Date of Birth (mm/dd/yyyy) as indicated and click Submit. You will be taken to the next sign-up page. 5. Create a Hookit ID. This will be your Hookit login ID and cannot be changed, so think of one that is secure and easy to remember. 6. Create a Hookit password. You can change your password at any time. 7. Enter your Password Reset Question and Answer. This can be used at a later time if you forget your password. 8. Enter your e-mail address. You will receive e-mail notification when new information is available in [address] 9. Click Sign Up. You can now view and download portions of your medical record. 10. Click the Download Summary menu link to download a portable copy of your medical information. If you have questions, please visit the Frequently Asked Questions section of the [employer] website. Remember, [employer] is NOT to be used for urgent needs. For medical emergencies, dial 911. Now available from your iPhone and Android! Please provide this summary of care documentation to your next provider. Your primary care clinician is listed as Ericka Alvarado. If you have any questions after today's visit, please call [phone].

## 2018-03-05 NOTE — PROGRESS NOTES
Tiffany Mooney is a 29 y.o. female who presents with the following  Chief Complaint   Patient presents with    Follow-up     pain management       HPI Patient of Dr. Morales Walker comes in for a follow up for pain management. Currently maintaining on Oxycodone 5 mg IR tablets 1 in the AM and half to 1 tablets in the PM PRN. She states she will take the 1/2 tablet maybe 1-3 times a week depending on how long or how active of a day she has had. She states the best pain she can get to is about a 3 and that is today. Sometimes she can get as high as an 8 when she has been more active then normal. She states the pain is located in her low back and is midline along the spine. She has no radiation of pain down the legs. She states she is having a little discomfort in the right arm after a new graft placement for dialysis but overall this has healed nicely. Gets dialysis Tuesday Thursday Saturday at a center. Used to do it at home but got infected port. She states she is not sleeping well. She has noticed on days she does not sleep her pain is usually elevated and more sensitive. Was on Elavil at one point did not help back but really helped her sleep. Interested in maybe restarting this. Allergies   Allergen Reactions    Compazine [Prochlorperazine Edisylate] Nausea and Vomiting and Palpitations       Current Outpatient Prescriptions   Medication Sig    oxyCODONE IR (ROXICODONE) 5 mg immediate release tablet Take 1 tab in AM and HALF to 1 tab in PM if needed on days where back pain is severe     amitriptyline (ELAVIL) 25 mg tablet Take 1 Tab by mouth nightly. PRN    predniSONE (DELTASONE) 5 mg tablet Take 5 mg by mouth daily.  fluticasone-vilanterol (BREO ELLIPTA) 200-25 mcg/dose inhaler Take 1 Puff by inhalation daily. Rinse mouth out after use    gemfibrozil (LOPID) 600 mg tablet Take 300 mg by mouth daily.  carvedilol (COREG) 6.25 mg tablet Take 12.5 mg by mouth two (2) times daily (with meals).     apixaban (ELIQUIS) 5 mg tablet Take 5 mg by mouth.  azaTHIOprine (IMURAN) 50 mg tablet Take 50 mg by mouth daily (after breakfast).  albuterol (PROVENTIL HFA, VENTOLIN HFA, PROAIR HFA) 90 mcg/actuation inhaler Take 1-2 Puffs by inhalation every four (4) hours as needed for Wheezing or Shortness of Breath.  hydroxychloroquine (PLAQUENIL) 200 mg tablet Take 200 mg by mouth two (2) times a day.  allopurinol (ZYLOPRIM) 100 mg tablet Take 100 mg by mouth daily (after breakfast). Needs to TAKE on a full stomach; will cause her to be nauseated.  cyanocobalamin (VITAMIN B-12) 2,500 mcg sublingual tablet Take 1 Tab by mouth daily.  pantoprazole (PROTONIX) 40 mg tablet Take 1 Tab by mouth Daily (before breakfast).  senna (SENNA) 8.6 mg tablet Take 1 Tab by mouth daily as needed. No current facility-administered medications for this visit. History   Smoking Status    Never Smoker   Smokeless Tobacco    Never Used       Past Medical History:   Diagnosis Date    Anemia     secondary to lupus    Asthma     no inhaler use in past 2 to 3 years    Carditis     Chronic kidney disease     ESRD    Chronic pain     DDD (degenerative disc disease), lumbar     ESRD (end stage renal disease) (HCC)     GERD (gastroesophageal reflux disease)     Heart failure ([address]     Hemodialysis patient ([address] 12/21/2017    [address]  Tuesday,  Thursday,  and Saturday. 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     Poor historian 01/17/2018    With medications    Thromboembolus [facility]) 2013    lungs    Transfusion history     Last Transfusion 12/21/2017  at [facility]       Past Surgical History:   Procedure Laterality Date    HX  SECTION  11/2006    x1    HX OTHER SURGICAL  9/16/15    INSERTION PD CATH; Removed 2017    HX VASCULAR ACCESS Right 2017    Double-Lumen henry catheter upper chest       Family History   Problem Relation Age of Onset    Diabetes Father     Hypertension Father     Cancer Other      aunt with breast cancer    Diabetes Mother        Social History     Social History    Marital status: SINGLE     Spouse name: N/A    Number of children: N/A    Years of education: N/A     Social History Main Topics    Smoking status: Never Smoker    Smokeless tobacco: Never Used    Alcohol use No    Drug use: Yes     Special: Prescription, OTC    Sexual activity: Not Currently     Other Topics Concern     Service No    Blood Transfusions No    Caffeine Concern No    Occupational Exposure No    Hobby Hazards No    Sleep Concern No    Stress Concern No    Weight Concern No    Special Diet No    Back Care Yes     low back pain    Exercise No    Bike Helmet No    Seat Belt Yes    Self-Exams No     Social History Narrative    Lives with parents and daughter. Review of Systems   Constitutional: Positive for malaise/fatigue. Eyes: Negative for blurred vision, double vision and photophobia. Respiratory: Negative for shortness of breath and wheezing. Gastrointestinal: Negative for nausea and vomiting. Musculoskeletal: Positive for back pain. Negative for falls. Neurological: Positive for weakness. Negative for dizziness, tingling, tremors, sensory change, seizures, loss of consciousness and headaches. Remainder of comprehensive review is negative.      Physical Exam :    Visit Vitals    BP (!) 124/92    Ht 5' 5\" (1.651 m)    Wt 68 kg (150 lb)    BMI 24.96 kg/m2       General: Well defined, nourished, and groomed individual in no acute distress.    Psych: Good mood and bright affect    NEUROLOGICAL EXAMINATION:    Mental Status: Alert and oriented to person, place, and time    Cranial Nerves:    II, III, IV, VI: Visual acuity grossly intact. Visual fields are normal.    Pupils are equal, round, and reactive to light and accommodation.    Extra-ocular movements are full and fluid. Fundoscopic exam was benign, no ptosis or nystagmus.    V-XII: Hearing is grossly intact. Facial features are symmetric, with normal sensation and strength. The palate rises symmetrically and the tongue protrudes midline. Sternocleidomastoids 5/5. Motor Examination: Normal tone, bulk, and strength, 4/5 muscle strength throughout. Coordination: Finger to nose was normal.     Gait and Station: wobbly, slow gait. Reflexes: DTRs 1+ throughout. Results for orders placed or performed during the hospital encounter of 01/19/18   HCG QL SERUM   Result Value Ref Range    HCG, Ql. NEGATIVE  NEG     POC CHEM8   Result Value Ref Range    Calcium, ionized (POC) 1.14 1.12 - 1.32 MMOL/L    Sodium (POC) 137 136 - 145 MMOL/L    Potassium (POC) 4.4 3.5 - 5.1 MMOL/L    Chloride (POC) 96 (L) 98 - 107 MMOL/L    CO2 (POC) 33 (H) 21 - 32 MMOL/L    Anion gap (POC) 14 5 - 15 mmol/L    Glucose (POC) 79 65 - 100 MG/DL    BUN (POC) 24 (H) 9 - 20 MG/DL    Creatinine (POC) 5.5 (H) 0.6 - 1.3 MG/DL    GFRAA, POC 11 (L) >60 ml/min/1.73m2    GFRNA, POC 9 (L) >60 ml/min/1.73m2    Hemoglobin (POC) 7.8 (L) 11.5 - 16.0 GM/DL    Hematocrit (POC) 23 (L) 35.0 - 47.0 %    Comment Comment Not Indicated. Orders Placed This Encounter    9+OXYCO+ALC-UNBUND, ORAL FLUID    oxyCODONE IR (ROXICODONE) 5 mg immediate release tablet     Sig: Take 1 tab in AM and HALF to 1 tab in PM if needed on days where back pain is severe      Dispense:  40 Tab     Refill:  0    amitriptyline (ELAVIL) 25 mg tablet     Sig: Take 1 Tab by mouth nightly. PRN     Dispense:  30 Tab     Refill:  5       1.  Chronic bilateral low back pain without sciatica        Follow-up Disposition: Not on File      Chronic back pain. She is to keep her current Oxycodone dosing as this was last filled on 02/04/2018. She is to get a mouth swab to evaluate her medications as on dialysis is unable to urinate to check a urine drug screen. Can re institute PRN elavil 25 mg nightly as this may positively influence her pain level and threshold as this helped sleep in the past. She will follow up in about 4 weeks.        This note will not be viewable in [employer]

## 2018-03-09 LAB
AMPHETAMINES SAL QL SCN: NEGATIVE NG/ML
BARBITURATES SAL QL SCN: NEGATIVE NG/ML
BENZODIAZ SAL QL SCN: NEGATIVE NG/ML
CANNABINOIDS SAL QL SCN: NEGATIVE NG/ML
COCAINE SAL QL SCN: NEGATIVE NG/ML
ETHANOL SAL QL SCN: NEGATIVE %
Lab: POSITIVE
METHADONE SAL QL SCN: NEGATIVE NG/ML
OPIATES SAL QL SCN: NEGATIVE NG/ML
OXYCODONE SAL CFM-MCNC: 108 NG/ML
OXYCODONE+OXYMORPHONE SAL QL SCN: NORMAL NG/ML
OXYCODONE/OXYMORPH, 763898: POSITIVE
OXYMORPHONE, 763902: NEGATIVE
PCP SAL QL SCN: NEGATIVE NG/ML
PROPOXYPH SAL QL SCN: NEGATIVE NG/ML

## 2018-03-11 ENCOUNTER — APPOINTMENT (OUTPATIENT)
Dept: CT IMAGING | Age: 33
DRG: 853 | End: 2018-03-11
Attending: NURSE PRACTITIONER
Payer: MEDICARE

## 2018-03-11 ENCOUNTER — HOSPITAL ENCOUNTER (INPATIENT)
Age: 33
LOS: 40 days | Discharge: HOME HEALTH | DRG: 853 | End: 2018-04-20
Attending: STUDENT IN AN ORGANIZED HEALTH CARE EDUCATION/TRAINING PROGRAM | Admitting: HOSPITALIST
Payer: MEDICARE

## 2018-03-11 ENCOUNTER — APPOINTMENT (OUTPATIENT)
Dept: GENERAL RADIOLOGY | Age: 33
DRG: 853 | End: 2018-03-11
Attending: NURSE PRACTITIONER
Payer: MEDICARE

## 2018-03-11 DIAGNOSIS — R53.81 DEBILITY: ICD-10-CM

## 2018-03-11 DIAGNOSIS — Z79.01 CHRONIC ANTICOAGULATION: ICD-10-CM

## 2018-03-11 DIAGNOSIS — Z99.2 ESRD ON PERITONEAL DIALYSIS (HCC): Chronic | ICD-10-CM

## 2018-03-11 DIAGNOSIS — K59.09 OTHER CONSTIPATION: ICD-10-CM

## 2018-03-11 DIAGNOSIS — N18.9 ANEMIA OF RENAL DISEASE: ICD-10-CM

## 2018-03-11 DIAGNOSIS — Z86.711 HISTORY OF PULMONARY EMBOLISM: ICD-10-CM

## 2018-03-11 DIAGNOSIS — R18.8 OTHER ASCITES: ICD-10-CM

## 2018-03-11 DIAGNOSIS — G89.29 CHRONIC BILATERAL LOW BACK PAIN WITHOUT SCIATICA: ICD-10-CM

## 2018-03-11 DIAGNOSIS — K65.9 PERITONITIS (HCC): Primary | ICD-10-CM

## 2018-03-11 DIAGNOSIS — M54.50 CHRONIC BILATERAL LOW BACK PAIN WITHOUT SCIATICA: ICD-10-CM

## 2018-03-11 DIAGNOSIS — D69.6 THROMBOCYTOPENIA (HCC): ICD-10-CM

## 2018-03-11 DIAGNOSIS — D63.1 ANEMIA OF RENAL DISEASE: ICD-10-CM

## 2018-03-11 DIAGNOSIS — Z86.711 HISTORY OF PULMONARY EMBOLUS (PE): ICD-10-CM

## 2018-03-11 DIAGNOSIS — N18.6 ESRD ON PERITONEAL DIALYSIS (HCC): Chronic | ICD-10-CM

## 2018-03-11 DIAGNOSIS — L93.2 OTHER LOCAL LUPUS ERYTHEMATOSUS: ICD-10-CM

## 2018-03-11 DIAGNOSIS — R10.84 GENERALIZED ABDOMINAL PAIN: ICD-10-CM

## 2018-03-11 DIAGNOSIS — Z99.2 DEPENDENCE ON PERITONEAL DIALYSIS (HCC): ICD-10-CM

## 2018-03-11 LAB
ALBUMIN SERPL-MCNC: 1.5 G/DL (ref 3.5–5)
ALBUMIN/GLOB SERPL: 0.3 {RATIO} (ref 1.1–2.2)
ALP SERPL-CCNC: 73 U/L (ref 45–117)
ALT SERPL-CCNC: 6 U/L (ref 12–78)
ANION GAP SERPL CALC-SCNC: 7 MMOL/L (ref 5–15)
APPEARANCE FLD: ABNORMAL
AST SERPL-CCNC: 24 U/L (ref 15–37)
BASOPHILS # BLD: 0 K/UL (ref 0–0.1)
BASOPHILS NFR BLD: 0 % (ref 0–1)
BILIRUB SERPL-MCNC: 1.3 MG/DL (ref 0.2–1)
BUN SERPL-MCNC: 13 MG/DL (ref 6–20)
BUN/CREAT SERPL: 4 (ref 12–20)
CALCIUM SERPL-MCNC: 8.5 MG/DL (ref 8.5–10.1)
CHLORIDE SERPL-SCNC: 97 MMOL/L (ref 97–108)
CO2 SERPL-SCNC: 34 MMOL/L (ref 21–32)
COLOR FLD: YELLOW
CREAT SERPL-MCNC: 3.68 MG/DL (ref 0.55–1.02)
DIFFERENTIAL METHOD BLD: ABNORMAL
EOSINOPHIL # BLD: 0 K/UL (ref 0–0.4)
EOSINOPHIL NFR BLD: 0 % (ref 0–7)
ERYTHROCYTE [DISTWIDTH] IN BLOOD BY AUTOMATED COUNT: 18.4 % (ref 11.5–14.5)
GLOBULIN SER CALC-MCNC: 5.9 G/DL (ref 2–4)
GLUCOSE SERPL-MCNC: 71 MG/DL (ref 65–100)
HCT VFR BLD AUTO: 30.9 % (ref 35–47)
HGB BLD-MCNC: 9.2 G/DL (ref 11.5–16)
IMM GRANULOCYTES # BLD: 0 K/UL
IMM GRANULOCYTES NFR BLD AUTO: 0 %
LACTATE SERPL-SCNC: 1.5 MMOL/L (ref 0.4–2)
LIPASE SERPL-CCNC: 27 U/L (ref 73–393)
LYMPHOCYTES # BLD: 0.6 K/UL (ref 0.8–3.5)
LYMPHOCYTES NFR BLD: 24 % (ref 12–49)
LYMPHOCYTES NFR FLD: 7 %
MCH RBC QN AUTO: 25.3 PG (ref 26–34)
MCHC RBC AUTO-ENTMCNC: 29.8 G/DL (ref 30–36.5)
MCV RBC AUTO: 85.1 FL (ref 80–99)
MONOCYTES # BLD: 0.2 K/UL (ref 0–1)
MONOCYTES NFR BLD: 7 % (ref 5–13)
MONOS+MACROS NFR FLD: 8 %
NEUTROPHILS NFR FLD: 85 %
NEUTS BAND NFR BLD MANUAL: 4 % (ref 0–6)
NEUTS SEG # BLD: 1.7 K/UL (ref 1.8–8)
NEUTS SEG NFR BLD: 65 % (ref 32–75)
NRBC # BLD: 0 K/UL (ref 0–0.01)
NRBC BLD-RTO: 0 PER 100 WBC
NUC CELL # FLD: 3524 /CU MM
PLATELET # BLD AUTO: 110 K/UL (ref 150–400)
PMV BLD AUTO: 12.6 FL (ref 8.9–12.9)
POTASSIUM SERPL-SCNC: 3.7 MMOL/L (ref 3.5–5.1)
PROT SERPL-MCNC: 7.4 G/DL (ref 6.4–8.2)
RBC # BLD AUTO: 3.63 M/UL (ref 3.8–5.2)
RBC # FLD: >100 /CU MM
RBC MORPH BLD: ABNORMAL
RBC MORPH BLD: ABNORMAL
SODIUM SERPL-SCNC: 138 MMOL/L (ref 136–145)
SPECIMEN SOURCE FLD: ABNORMAL
WBC # BLD AUTO: 2.5 K/UL (ref 3.6–11)

## 2018-03-11 PROCEDURE — 83690 ASSAY OF LIPASE: CPT | Performed by: NURSE PRACTITIONER

## 2018-03-11 PROCEDURE — 99284 EMERGENCY DEPT VISIT MOD MDM: CPT

## 2018-03-11 PROCEDURE — 96361 HYDRATE IV INFUSION ADD-ON: CPT

## 2018-03-11 PROCEDURE — 74011000258 HC RX REV CODE- 258: Performed by: STUDENT IN AN ORGANIZED HEALTH CARE EDUCATION/TRAINING PROGRAM

## 2018-03-11 PROCEDURE — 94640 AIRWAY INHALATION TREATMENT: CPT

## 2018-03-11 PROCEDURE — 99283 EMERGENCY DEPT VISIT LOW MDM: CPT

## 2018-03-11 PROCEDURE — 87186 SC STD MICRODIL/AGAR DIL: CPT | Performed by: STUDENT IN AN ORGANIZED HEALTH CARE EDUCATION/TRAINING PROGRAM

## 2018-03-11 PROCEDURE — 74011250636 HC RX REV CODE- 250/636: Performed by: STUDENT IN AN ORGANIZED HEALTH CARE EDUCATION/TRAINING PROGRAM

## 2018-03-11 PROCEDURE — 74011250637 HC RX REV CODE- 250/637: Performed by: HOSPITALIST

## 2018-03-11 PROCEDURE — 83605 ASSAY OF LACTIC ACID: CPT | Performed by: NURSE PRACTITIONER

## 2018-03-11 PROCEDURE — 71045 X-RAY EXAM CHEST 1 VIEW: CPT

## 2018-03-11 PROCEDURE — 96375 TX/PRO/DX INJ NEW DRUG ADDON: CPT

## 2018-03-11 PROCEDURE — 80053 COMPREHEN METABOLIC PANEL: CPT | Performed by: NURSE PRACTITIONER

## 2018-03-11 PROCEDURE — 65610000006 HC RM INTENSIVE CARE

## 2018-03-11 PROCEDURE — 87077 CULTURE AEROBIC IDENTIFY: CPT | Performed by: STUDENT IN AN ORGANIZED HEALTH CARE EDUCATION/TRAINING PROGRAM

## 2018-03-11 PROCEDURE — 74011000250 HC RX REV CODE- 250: Performed by: HOSPITALIST

## 2018-03-11 PROCEDURE — 74011250636 HC RX REV CODE- 250/636: Performed by: NURSE PRACTITIONER

## 2018-03-11 PROCEDURE — 89050 BODY FLUID CELL COUNT: CPT | Performed by: STUDENT IN AN ORGANIZED HEALTH CARE EDUCATION/TRAINING PROGRAM

## 2018-03-11 PROCEDURE — 74011000250 HC RX REV CODE- 250: Performed by: PHYSICIAN ASSISTANT

## 2018-03-11 PROCEDURE — 85025 COMPLETE CBC W/AUTO DIFF WBC: CPT | Performed by: NURSE PRACTITIONER

## 2018-03-11 PROCEDURE — 36415 COLL VENOUS BLD VENIPUNCTURE: CPT | Performed by: HOSPITALIST

## 2018-03-11 PROCEDURE — 87070 CULTURE OTHR SPECIMN AEROBIC: CPT | Performed by: STUDENT IN AN ORGANIZED HEALTH CARE EDUCATION/TRAINING PROGRAM

## 2018-03-11 PROCEDURE — 96365 THER/PROPH/DIAG IV INF INIT: CPT

## 2018-03-11 PROCEDURE — 74176 CT ABD & PELVIS W/O CONTRAST: CPT

## 2018-03-11 PROCEDURE — 93041 RHYTHM ECG TRACING: CPT

## 2018-03-11 PROCEDURE — 74011250636 HC RX REV CODE- 250/636: Performed by: HOSPITALIST

## 2018-03-11 PROCEDURE — 77030029684 HC NEB SM VOL KT MONA -A

## 2018-03-11 PROCEDURE — 87040 BLOOD CULTURE FOR BACTERIA: CPT | Performed by: HOSPITALIST

## 2018-03-11 RX ORDER — GEMFIBROZIL 600 MG/1
300 TABLET, FILM COATED ORAL DAILY
Status: DISCONTINUED | OUTPATIENT
Start: 2018-03-12 | End: 2018-04-20 | Stop reason: HOSPADM

## 2018-03-11 RX ORDER — LIDOCAINE HYDROCHLORIDE 10 MG/ML
10 INJECTION INFILTRATION; PERINEURAL ONCE
Status: COMPLETED | OUTPATIENT
Start: 2018-03-11 | End: 2018-03-11

## 2018-03-11 RX ORDER — HYDROXYCHLOROQUINE SULFATE 200 MG/1
200 TABLET, FILM COATED ORAL 2 TIMES DAILY
Status: DISCONTINUED | OUTPATIENT
Start: 2018-03-11 | End: 2018-04-20 | Stop reason: HOSPADM

## 2018-03-11 RX ORDER — ARFORMOTEROL TARTRATE 15 UG/2ML
15 SOLUTION RESPIRATORY (INHALATION)
Status: DISCONTINUED | OUTPATIENT
Start: 2018-03-11 | End: 2018-04-20 | Stop reason: HOSPADM

## 2018-03-11 RX ORDER — HYDROMORPHONE HYDROCHLORIDE 2 MG/ML
0.5 INJECTION, SOLUTION INTRAMUSCULAR; INTRAVENOUS; SUBCUTANEOUS
Status: DISCONTINUED | OUTPATIENT
Start: 2018-03-11 | End: 2018-03-13

## 2018-03-11 RX ORDER — HYDROMORPHONE HYDROCHLORIDE 1 MG/ML
0.5 INJECTION, SOLUTION INTRAMUSCULAR; INTRAVENOUS; SUBCUTANEOUS ONCE
Status: COMPLETED | OUTPATIENT
Start: 2018-03-11 | End: 2018-03-11

## 2018-03-11 RX ORDER — AMITRIPTYLINE HYDROCHLORIDE 50 MG/1
25 TABLET, FILM COATED ORAL
Status: DISCONTINUED | OUTPATIENT
Start: 2018-03-11 | End: 2018-04-20 | Stop reason: HOSPADM

## 2018-03-11 RX ORDER — BUDESONIDE 0.5 MG/2ML
500 INHALANT ORAL
Status: DISCONTINUED | OUTPATIENT
Start: 2018-03-11 | End: 2018-04-20 | Stop reason: HOSPADM

## 2018-03-11 RX ORDER — CARVEDILOL 12.5 MG/1
12.5 TABLET ORAL 2 TIMES DAILY WITH MEALS
Status: CANCELLED | OUTPATIENT
Start: 2018-03-11

## 2018-03-11 RX ORDER — LANOLIN ALCOHOL/MO/W.PET/CERES
2500 CREAM (GRAM) TOPICAL DAILY
Status: DISCONTINUED | OUTPATIENT
Start: 2018-03-12 | End: 2018-04-20 | Stop reason: HOSPADM

## 2018-03-11 RX ORDER — SODIUM CHLORIDE 0.9 % (FLUSH) 0.9 %
5-10 SYRINGE (ML) INJECTION EVERY 8 HOURS
Status: DISCONTINUED | OUTPATIENT
Start: 2018-03-11 | End: 2018-04-20 | Stop reason: HOSPADM

## 2018-03-11 RX ORDER — VANCOMYCIN/0.9 % SOD CHLORIDE 1.5G/250ML
1500 PLASTIC BAG, INJECTION (ML) INTRAVENOUS ONCE
Status: COMPLETED | OUTPATIENT
Start: 2018-03-11 | End: 2018-03-11

## 2018-03-11 RX ORDER — ALLOPURINOL 100 MG/1
100 TABLET ORAL
Status: DISCONTINUED | OUTPATIENT
Start: 2018-03-12 | End: 2018-03-17

## 2018-03-11 RX ORDER — FLUCONAZOLE 2 MG/ML
200 INJECTION, SOLUTION INTRAVENOUS EVERY 24 HOURS
Status: DISCONTINUED | OUTPATIENT
Start: 2018-03-11 | End: 2018-03-14 | Stop reason: ALTCHOICE

## 2018-03-11 RX ORDER — ALBUTEROL SULFATE 0.83 MG/ML
2.5 SOLUTION RESPIRATORY (INHALATION)
Status: DISCONTINUED | OUTPATIENT
Start: 2018-03-11 | End: 2018-04-20 | Stop reason: HOSPADM

## 2018-03-11 RX ORDER — SODIUM CHLORIDE 0.9 % (FLUSH) 0.9 %
5-10 SYRINGE (ML) INJECTION AS NEEDED
Status: DISCONTINUED | OUTPATIENT
Start: 2018-03-11 | End: 2018-04-20 | Stop reason: HOSPADM

## 2018-03-11 RX ORDER — OXYCODONE HYDROCHLORIDE 5 MG/1
2.5-5 TABLET ORAL
Status: DISCONTINUED | OUTPATIENT
Start: 2018-03-11 | End: 2018-04-04

## 2018-03-11 RX ORDER — AZATHIOPRINE 50 MG/1
50 TABLET ORAL
Status: DISCONTINUED | OUTPATIENT
Start: 2018-03-12 | End: 2018-03-17

## 2018-03-11 RX ORDER — SODIUM CHLORIDE 9 MG/ML
50 INJECTION, SOLUTION INTRAVENOUS CONTINUOUS
Status: DISCONTINUED | OUTPATIENT
Start: 2018-03-11 | End: 2018-03-12

## 2018-03-11 RX ORDER — ONDANSETRON 2 MG/ML
4 INJECTION INTRAMUSCULAR; INTRAVENOUS
Status: DISCONTINUED | OUTPATIENT
Start: 2018-03-11 | End: 2018-04-20 | Stop reason: HOSPADM

## 2018-03-11 RX ORDER — PANTOPRAZOLE SODIUM 40 MG/1
40 TABLET, DELAYED RELEASE ORAL
Status: DISCONTINUED | OUTPATIENT
Start: 2018-03-12 | End: 2018-04-20 | Stop reason: HOSPADM

## 2018-03-11 RX ORDER — SENNOSIDES 8.6 MG/1
1 TABLET ORAL
Status: COMPLETED | OUTPATIENT
Start: 2018-03-11 | End: 2018-03-11

## 2018-03-11 RX ORDER — PREDNISONE 10 MG/1
5 TABLET ORAL DAILY
Status: CANCELLED | OUTPATIENT
Start: 2018-03-12

## 2018-03-11 RX ADMIN — HYDROXYCHLOROQUINE SULFATE 200 MG: 200 TABLET, FILM COATED ORAL at 21:09

## 2018-03-11 RX ADMIN — HYDROMORPHONE HYDROCHLORIDE 0.5 MG: 2 INJECTION INTRAMUSCULAR; INTRAVENOUS; SUBCUTANEOUS at 21:10

## 2018-03-11 RX ADMIN — HYDROMORPHONE HYDROCHLORIDE 0.5 MG: 1 INJECTION, SOLUTION INTRAMUSCULAR; INTRAVENOUS; SUBCUTANEOUS at 16:36

## 2018-03-11 RX ADMIN — FLUCONAZOLE 200 MG: 2 INJECTION, SOLUTION INTRAVENOUS at 21:13

## 2018-03-11 RX ADMIN — VANCOMYCIN HYDROCHLORIDE 1500 MG: 10 INJECTION, POWDER, LYOPHILIZED, FOR SOLUTION INTRAVENOUS at 18:26

## 2018-03-11 RX ADMIN — APIXABAN 5 MG: 5 TABLET, FILM COATED ORAL at 21:09

## 2018-03-11 RX ADMIN — SODIUM CHLORIDE 50 ML/HR: 900 INJECTION, SOLUTION INTRAVENOUS at 20:33

## 2018-03-11 RX ADMIN — LIDOCAINE HYDROCHLORIDE 10 ML: 10 INJECTION, SOLUTION INFILTRATION; PERINEURAL at 17:06

## 2018-03-11 RX ADMIN — SODIUM CHLORIDE 250 ML: 900 INJECTION, SOLUTION INTRAVENOUS at 13:57

## 2018-03-11 RX ADMIN — BUDESONIDE 500 MCG: 0.5 INHALANT RESPIRATORY (INHALATION) at 21:59

## 2018-03-11 RX ADMIN — AMITRIPTYLINE HYDROCHLORIDE 25 MG: 10 TABLET, FILM COATED ORAL at 21:09

## 2018-03-11 RX ADMIN — PIPERACILLIN SODIUM AND TAZOBACTAM SODIUM 4.5 G: 4; .5 INJECTION, POWDER, LYOPHILIZED, FOR SOLUTION INTRAVENOUS at 16:05

## 2018-03-11 RX ADMIN — SENNOSIDES 8.6 MG: 8.6 TABLET, FILM COATED ORAL at 21:09

## 2018-03-11 RX ADMIN — ARFORMOTEROL TARTRATE 15 MCG: 15 SOLUTION RESPIRATORY (INHALATION) at 21:59

## 2018-03-11 NOTE — IP AVS SNAPSHOT
[address] [facility] 74 
[phone] Patient: Garrison Urena MRN: [id number] :1985 About your hospitalization You were admitted on:  2018 You last received care in the:  [address] [place] [address] You were discharged on:  2018 Why you were hospitalized Your primary diagnosis was:  Peritonitis (Hcc) Your diagnoses also included:  Generalized Abdominal Pain, Other Constipation, Other Ascites Follow-up Information Follow up With Details Comments Contact Info [address] [place] [address]   [address] 
1st Floor [facility] [address] 
[phone] Alex Dan NP Go on 2018 hospital f/u PCP appointment on 2018 @ 2:00 p.m.  [address] [facility] 74 
[phone] Phyllis Eckert MD In 1 week , call to make appointment for drain removal  [id number] [place] [address] 74 
[phone] Max Ward MD  Follow on Sat 163 [address] 109 [facility] 74 
[phone] Your Scheduled Appointments 2018  2:00 PM EDT TRANSITIONAL CARE MANAGEMENT with Alex Dan  [facility] ([address] [place] [address]) [address] [facility] 74  
[phone] Discharge Orders None A check fahad indicates which time of day the medication should be taken. My Medications START taking these medications Instructions Each Dose to Equal  
 Morning Noon Evening Bedtime  
 amLODIPine 5 mg tablet Commonly known as:  Job Dub Start taking on:  2018 Your last dose was: Your next dose is: Take 1 Tab by mouth daily. 5 mg HYDROmorphone 4 mg tablet Commonly known as:  DILAUDID Your last dose was: Your next dose is: Take 1 Tab by mouth every four (4) hours as needed. Max Daily Amount: 24 mg.  
 4 mg  
    
   
   
   
  
 polyethylene glycol 17 gram packet Commonly known as:  Avinash Lance Start taking on:  4/21/2018 Your last dose was: Your next dose is: Take 1 Packet by mouth daily. 17 g CONTINUE taking these medications Instructions Each Dose to Equal  
 Morning Noon Evening Bedtime  
 albuterol 90 mcg/actuation inhaler Commonly known as:  PROVENTIL HFA, VENTOLIN HFA, PROAIR HFA Your last dose was: Your next dose is: Take 1-2 Puffs by inhalation every four (4) hours as needed for Wheezing or Shortness of Breath. 1-2 Puff  
    
   
   
   
  
 allopurinol 100 mg tablet Commonly known as:  Ree Abide Your last dose was: Your next dose is: Take 100 mg by mouth daily (after breakfast). Needs to TAKE on a full stomach; will cause her to be nauseated. 100 mg  
    
   
   
   
  
 amitriptyline 25 mg tablet Commonly known as:  ELAVIL Your last dose was: Your next dose is: Take 1 Tab by mouth nightly. PRN  
 25 mg  
    
   
   
   
  
 apixaban 5 mg tablet Commonly known as:  Ziyad Rizo Your last dose was: Your next dose is: Take 5 mg by mouth every twelve (12) hours. 5 mg  
    
   
   
   
  
 azaTHIOprine 50 mg tablet Commonly known as:  The Pepsi Your last dose was: Your next dose is: Take 50 mg by mouth daily (after breakfast). 50 mg COREG 6.25 mg tablet Generic drug:  carvedilol Your last dose was: Your next dose is: Take 12.5 mg by mouth two (2) times daily (with meals). 12.5 mg  
    
   
   
   
  
 cyanocobalamin 2,500 mcg sublingual tablet Commonly known as:  VITAMIN B-12 Your last dose was: Your next dose is: Take 1 Tab by mouth daily. 2500 mcg  
    
   
   
   
  
 fluticasone-vilanterol 200-25 mcg/dose inhaler Commonly known as:  BREO ELLIPTA Your last dose was: Your next dose is: Take 1 Puff by inhalation daily. Rinse mouth out after use 1 Puff  
    
   
   
   
  
 gemfibrozil 600 mg tablet Commonly known as:  LOPID Your last dose was: Your next dose is: Take 300 mg by mouth daily. 300 mg  
    
   
   
   
  
 pantoprazole 40 mg tablet Commonly known as:  PROTONIX Your last dose was: Your next dose is: Take 1 Tab by mouth Daily (before breakfast). 40 mg  
    
   
   
   
  
 PLAQUENIL 200 mg tablet Generic drug:  hydroxychloroquine Your last dose was: Your next dose is: Take 200 mg by mouth two (2) times a day. 200 mg  
    
   
   
   
  
 predniSONE 5 mg tablet Commonly known as:  Vitoene Reels Your last dose was: Your next dose is: Take 5 mg by mouth daily. 5 mg Senna 8.6 mg tablet Generic drug:  senna Your last dose was: Your next dose is: Take 1 Tab by mouth daily as needed. 1 Tab STOP taking these medications   
 oxyCODONE IR 5 mg immediate release tablet Commonly known as:  Maira Samuel Where to Get Your Medications Information on where to get these meds will be given to you by the nurse or doctor. ! Ask your nurse or doctor about these medications  
  amLODIPine 5 mg tablet HYDROmorphone 4 mg tablet  
 polyethylene glycol 17 gram packet Opioid Education Prescription Opioids: What You Need to Know: 
 
 
You have been admitted to the hospital with the following diagnoses: 
Peritonitis ([address] ESRD Daryl Vazquez FOLLOW-UP CARE RECOMMENDATIONS: 
 
APPOINTMENTS: 
Follow-up Information Follow up With Details Comments Contact Info 6 [facility]   [address] 
1st Floor [facility] [address] 
[phone] Rosalva Lin NP In 2 weeks discharge follow up  [address] [facility] [address] 
[phone] Chandrakant Lee MD In 1 week , call to make appointment for drain removal  [address] [facility] [address] 
[phone] Asya Becker MD  Follow on T-Th-Sat 163 [facility] [address] 
[phone] FOLLOW-UP TESTS recommended: - Please make appointment with Surgery for drain removal 
- Follow up at HD for T-Th-Sat PENDING TEST RESULTS: 
At the time of your discharge the following test results are still pending: none Please make sure you review these results with your outpatient follow-up provider(s). SYMPTOMS to watch for: chest pain, shortness of breath, fever, chills, nausea, vomiting, diarrhea, change in mentation, falling, weakness, bleeding. DIET/what to eat: Renal diet ACTIVITY:  Activity as tolerated WOUND CARE: NONE 
 
EQUIPMENT needed:  NONE What to do if new or unexpected symptoms occur? If you experience any of the above symptoms (or should other concerns or questions arise after discharge) please call your primary care physician. Return to the emergency room if you cannot get hold of your doctor. · It is very important that you keep your follow-up appointment(s).  
· Please bring discharge papers, medication list (and/or medication bottles) to your follow-up appointments for review by your outpatient provider(s). · Please check the list of medications and be sure it includes every medication (even non-prescription medications) that your provider wants you to take. · It is important that you take the medication exactly as they are prescribed. · Keep your medication in the bottles provided by the pharmacist and keep a list of the medication names, dosages, and times to be taken in your wallet. · Do not take other medications without consulting your doctor. · If you have any questions about your medications or other instructions, please talk to your nurse or care provider before you leave the hospital. 
 
I understand that if any problems occur once I am at home I am to contact my physician. These instructions were explained to me and I had the opportunity to ask questions. Surgical Drain Care: Care Instructions What is a surgical drain? After a surgery, fluid may collect inside your body in the surgical area. This makes an infection or other problems more likely. A surgical drain allows the fluid to flow out. The doctor will put a thin rubber tube into the area of your body where the fluid is likely to collect. The rubber tube will carry the fluid outside your body. The most common type of surgical drain carries the fluid into a collection bulb that you empty. This is called a Simone-Feng drain. The drain uses suction created by the bulb to pull the fluid from your body into the bulb. The rubber tube will probably be held in place by one or two stitches in your skin. Most people attach the bulb with a safety pin to clothing or near the bandage so that it doesn't flip around or pull on the stitches. When you first get the drain, the fluid will be bloody. It will change color from red to pink to a light yellow or clear as the wound heals and the fluid starts to go away. Your doctor may give you specific information on when you no longer need the drain and when it will be removed. In general, you will need the drain until you are collecting less than about 2 tablespoons of fluid in 24 hours. Follow-up care is a key part of your treatment and safety. Be sure to make and go to all appointments, and call your doctor if you are having problems. It's also a good idea to know your test results and keep a list of the medicines you take. How can you care for yourself at home? Fluid collection Follow any instructions your doctor gives you. How often you empty the bulb depends on how much fluid is draining. Empty the bulb when it is half full. To empty the bulb: 
· Wash your hands with soap and water. · Take the plug out of the bulb. · Empty the bulb. If your doctor asks you to measure the fluid, empty the fluid into a measuring cup, and write down the color and how much you collected. Your doctor will want to know this information. How often you empty the bulb depends on how much fluid there is. Doctors often suggest emptying it when it's about half full. · Clean the plug with alcohol. · Squeeze the bulb until it is flat. This removes all the air from the bulb. You may need to put the bulb on a table or a counter to flatten it. · Keep the bulb flat and put the plug in. · The bulb should stay flat after you put the plug back in. This creates the suction that pulls the fluid into the bulb. · Empty the fluid into the toilet. · Wash your hands. Bandage care You may have a bandage. Your doctor will tell you how often to change it. · Wash your hands with soap and water. · Take off the bandage from around the drain. · Clean the drain site and the skin around it with soap and water. Use gauze or a cotton swab. · When the site is dry, put on a new bandage. Drain care Squeezing or \"milking\" the tube can help prevent clogs so that it drains correctly. Your doctor will tell you when you need to do this. In general, you do this when: 
· You see a clot in the tube that is preventing fluid from draining. The clot may look like a dark, stringy lining. · You see fluid leaking around the tube where it goes into the skin. · You think there is no suction in the drain. To milk the tube: · Use one hand to hold and pinch the tube where it leaves the skin. · With the other hand, pinch the tube with your thumb and first finger just below where you're holding it. · Slowly and firmly push your thumb and first finger down the tubing toward the bulb. · Do this as many times as you need to. The clot should move down the tube and into the bulb. When should you call for help? Call your doctor now or seek immediate medical care if: 
? · You have signs of infection, such as: 
¨ Increased pain, swelling, warmth, or redness around the area. ¨ Red streaks leading from the area. ¨ Pus draining from the area. ¨ A fever. ? · You see a sudden change in the color or smell of the drainage. ? · The tube is coming loose where it leaves your skin. ? Watch closely for changes in your health, and be sure to contact your doctor if: 
? · You see a lot of fluid around the drain. ? · You cannot remove a clot from the tube by milking the tube. Where can you learn more? Go to http://jorge-rochelle.info/. Enter K117 in the search box to learn more about \"Surgical Drain Care: Care Instructions. \" Current as of: March 20, 2017 Content Version: 11.4 © 4476-8113 [employer]. Care instructions adapted under license by [employer] (which disclaims liability or warranty for this information). If you have questions about a medical condition or this instruction, always ask your healthcare professional. [address] any warranty or liability for your use of this information. ACO Transitions of Care Introducing [facility] offers a voluntary care coordination program to provide high quality service and care to [facility] fee-for-service beneficiaries. Gualberto Toyin was designed to help you enhance your health and well-being through the following services: ? Transitions of Care  support for individuals who are transitioning from one care setting to another (example: Hospital to home). ? Chronic and Complex Care Coordination  support for individuals and caregivers of those with serious or chronic illnesses or with more than one chronic (ongoing) condition and those who take a number of different medications. If you meet specific medical criteria, a [address] [place] [address] Rd may call you directly to coordinate your care with your primary care physician and your other care providers. For questions about the [facility] programs, please, contact your physicians office. For general questions or additional information about Accountable Care Organizations: 
Please visit www.medicare.gov/acos. html or call 1-800-MEDICARE (5-352.901.2620) TTY users should call 0-809.584.3613. Bonush Announcement We are excited to announce that we are making your provider's discharge notes available to you in Bonush. You will see these notes when they are completed and signed by the physician that discharged you from your recent hospital stay. If you have any questions or concerns about any information you see in Bonush, please call the Health Information Department where you were seen or reach out to your Primary Care Provider for more information about your plan of care. Introducing [facility] & HEALTH SERVICES! [facility] introduces Bonush patient portal. Now you can access parts of your medical record, email your doctor's office, and request medication refills online.    
 
1. In your internet browser, go to [email or web address] 2. Click on the First Time User? Click Here link in the Sign In box. You will see the New Member Sign Up page. 3. Enter your Customer BOOM (formerly [employer]) Access Code exactly as it appears below. You will not need to use this code after youve completed the sign-up process. If you do not sign up before the expiration date, you must request a new code. · Customer [employer] (formerly [employer]) Access Code: [email or web address] Expires: 6/3/2018 11:58 AM 
 
4. Enter the last four digits of your Social Security Number (xxxx) and Date of Birth (mm/dd/yyyy) as indicated and click Submit. You will be taken to the next sign-up page. 5. Create a Beijingyichengt ID. This will be your [employer]) login ID and cannot be changed, so think of one that is secure and easy to remember. 6. Create a [employer] ([employer]) password. You can change your password at any time. 7. Enter your Password Reset Question and Answer. This can be used at a later time if you forget your password. 8. Enter your e-mail address. You will receive e-mail notification when new information is available in [address] 9. Click Sign Up. You can now view and download portions of your medical record. 10. Click the Download Summary menu link to download a portable copy of your medical information. If you have questions, please visit the Frequently Asked Questions section of the [employer]) website. Remember, [employer] ([employer]) is NOT to be used for urgent needs. For medical emergencies, dial 911. Now available from your iPhone and Android! Introducing Nito Major As a Fredy Mcneil patient, I wanted to make you aware of our electronic visit tool called Nito Major. [facility] 24/7 allows you to connect within minutes with a medical provider 24 hours a day, seven days a week via a mobile device or tablet or logging into a secure website from your computer. You can access Nito Major from anywhere in the [place]. A virtual visit might be right for you when you have a simple condition and feel like you just dont want to get out of bed, or cant get away from work for an appointment, when your regular [employer] provider is not available (evenings, weekends or holidays), or when youre out of town and need minor care. Electronic visits cost only $49 and if the [employer] 24/7 provider determines a prescription is needed to treat your condition, one can be electronically transmitted to a nearby pharmacy*. Please take a moment to enroll today if you have not already done so. The enrollment process is free and takes just a few minutes. To enroll, please download the [employer] 24/7 judith to your tablet or phone, or visit [email or web address] to enroll on your computer. And, as an [address] [place] [address] patient with a OnApp account, the results of your visits will be scanned into your electronic medical record and your primary care provider will be able to view the scanned results. We urge you to continue to see your regular [employer] provider for your ongoing medical care. And while your primary care provider may not be the one available when you seek a [facility] virtual visit, the peace of mind you get from getting a real diagnosis real time can be priceless. For more information on [facility], view our Frequently Asked Questions (FAQs) at [email or web address]. Sincerely, 
 
Charisma Peguero MD 
Chief Medical Officer Conor Krishnamurthy *:  certain medications cannot be prescribed via [facility] Unresulted Labs-Please follow up with your PCP about these lab tests Order Current Status CULTURE, BODY FLUID W GRAM STAIN Preliminary result Providers Seen During Your Hospitalization Provider Specialty Primary office phone Chandler Aguilar MD Emergency Medicine [phone] Pili Gomez MD Hospitalist [phone] Lukas Fernandez MD Internal Medicine [phone] Sherita Hinkle MD Internal Medicine [phone] Angela Royal MD Internal Medicine [phone] Ivana Munguia MD Internal Medicine [phone] Racquel Vásquez MD Hospitalist [phone] Stefan Perez MD Hospitalist [phone] Chantelle Deras MD Internal Medicine [phone] Your Primary Care Physician (PCP) Primary Care Physician Office Phone Office Fax Zelalem Rodriguez [phone] You are allergic to the following Allergen Reactions Compazine (Prochlorperazine Edisylate) Nausea and Vomiting Palpitations Recent Documentation Height Weight Breastfeeding? BMI OB Status Smoking Status 1.651 m 73.2 kg No 26.85 kg/m2 Medically Induced Never Smoker Emergency Contacts Name Discharge Info Relation Home Work Mobile Louis Sparks CAREGIVER [3] Mother [14] [phone] Stew Lockhart CAREGIVER [3] Father [15] [phone] Patient Belongings The following personal items are in your possession at time of discharge: 
  Dental Appliances: None  Visual Aid: None      Home Medications: None   Jewelry: None  Clothing:  (cell phone to security)    Other Valuables: At bedside, Cell Phone, [place] Please provide this summary of care documentation to your next provider. Signatures-by signing, you are acknowledging that this After Visit Summary has been reviewed with you and you have received a copy. Patient Signature:  ____________________________________________________________ Date:  ____________________________________________________________  
  
[facility] Provider Signature:  ____________________________________________________________ Date:  ____________________________________________________________

## 2018-03-11 NOTE — PROGRESS NOTES
1:29 PM  I have evaluated the patient as the Provider in Triage. I have reviewed Her vital signs and the triage nurse assessment. I have talked with the patient and any available family and advised that I am the provider in triage and have ordered the appropriate study to initiate their work up based on the clinical presentation during my assessment. I have advised that the patient will be accommodated in the Main ED as soon as possible. I have also requested to contact the triage nurse or myself immediately if the patient experiences any changes in their condition during this brief waiting period.   Cosmo Ireland, KHANH

## 2018-03-11 NOTE — ED PROVIDER NOTES
HPI Comments: 29 y.o. female with extensive past medical history, please see list, significant for asthma, heart failure, PE, Lupus, HTN, CKD, and anemia, who presents ambulatory in the company of her mother with chief complaint of abdominal pain. Pt reports aching abdominal pain x 5 days. She reports associated cough and fevers. She states has been taking her regular pain medication; denies use of new pain medication to treat this pain. Pt states she is on Tuesday/Thursday/Saturday dialysis with Dr. Rach Thornton. She reports she changed from peritoneal dialysis to right arm fistula in December. She notes her chronic BL leg swelling has been improving. Pt's mother also states pt has splenomegaly. Pt specifically denies any chills. There are no other acute medical concerns at this time. Social hx: denies tobacco use, denies EtOH use, denies illicit drug use  PCP: Mellissa Hernández NP  Nephrologist: Rach Thornton MD    Note written by Boy Kerr, as dictated by Christina Reyes MD 2:19 PM       The history is provided by the patient and a parent. No  was used. Past Medical History:   Diagnosis Date    Anemia     secondary to lupus    Asthma     no inhaler use in past 2 to 3 years    Carditis     Chronic kidney disease     ESRD    Chronic pain     DDD (degenerative disc disease), lumbar     ESRD (end stage renal disease) (HCC)     GERD (gastroesophageal reflux disease)     Heart failure ([address]     Hemodialysis patient ([facility] 12/21/2017    [address]  Tuesday,  Thursday,  and Saturday. 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     Poor historian 2018    With medications    Thromboembolus [facility]) 2013    lungs    Transfusion history     Last Transfusion 2017  at [facility]       Past Surgical History:   Procedure Laterality Date    HX  SECTION  11/2006    x1    HX OTHER SURGICAL  9/16/15    INSERTION PD CATH; Removed 2017    HX VASCULAR ACCESS Right 2017    Double-Lumen henry catheter upper chest         Family History:   Problem Relation Age of Onset    Diabetes Father     Hypertension Father     Cancer Other      aunt with breast cancer    Diabetes Mother        Social History     Social History    Marital status: SINGLE     Spouse name: N/A    Number of children: N/A    Years of education: N/A     Occupational History    Not on file. Social History Main Topics    Smoking status: Never Smoker    Smokeless tobacco: Never Used    Alcohol use No    Drug use: Yes     Special: Prescription, OTC    Sexual activity: Not Currently     Other Topics Concern     Service No    Blood Transfusions No    Caffeine Concern No    Occupational Exposure No    Hobby Hazards No    Sleep Concern No    Stress Concern No    Weight Concern No    Special Diet No    Back Care Yes     low back pain    Exercise No    Bike Helmet No    Seat Belt Yes    Self-Exams No     Social History Narrative    Lives with parents and daughter. ALLERGIES: Compazine [prochlorperazine edisylate]    Review of Systems   Constitutional: Positive for fever. Negative for activity change, diaphoresis and fatigue. HENT: Negative for congestion and sore throat. Eyes: Negative for photophobia and visual disturbance. Respiratory: Positive for cough. Negative for chest tightness and shortness of breath. Cardiovascular: Negative for chest pain, palpitations and leg swelling. Gastrointestinal: Positive for abdominal pain. Negative for blood in stool, constipation, diarrhea, nausea and vomiting.    Genitourinary: Negative for difficulty urinating, dysuria, flank pain, frequency and hematuria. Musculoskeletal: Negative for back pain. Neurological: Negative for dizziness, syncope, numbness and headaches. Vitals:    03/11/18 1328 03/11/18 1400   BP: (!) 77/52 (!) 84/59   Pulse: 71    Resp: 16    Temp: 98.3 °F (36.8 °C)    SpO2: 98%    Weight: 65.3 kg (144 lb)    Height: 5' 5\" (1.651 m)             Physical Exam   Constitutional: She is oriented to person, place, and time. She appears well-developed and well-nourished. She appears cachectic. She appears ill. No distress. HENT:   Head: Normocephalic and atraumatic. Nose: Nose normal.   Mouth/Throat: Oropharynx is clear and moist. No oropharyngeal exudate. Eyes: Conjunctivae and EOM are normal. Right eye exhibits no discharge. Left eye exhibits no discharge. No scleral icterus. Neck: Normal range of motion. Neck supple. No JVD present. No tracheal deviation present. No thyromegaly present. Cardiovascular: Normal rate, regular rhythm, normal heart sounds and intact distal pulses. Exam reveals no gallop and no friction rub. No murmur heard. Pulmonary/Chest: Effort normal and breath sounds normal. No stridor. No respiratory distress. She has no wheezes. She has no rales. She exhibits no tenderness. Abdominal: Bowel sounds are normal. She exhibits distension. She exhibits no mass. There is no tenderness. There is no rebound. Musculoskeletal: Normal range of motion. She exhibits no edema or tenderness. Lymphadenopathy:     She has no cervical adenopathy. Neurological: She is alert and oriented to person, place, and time. No cranial nerve deficit. Coordination normal.   Skin: Skin is warm and dry. No rash noted. She is not diaphoretic. No erythema. No pallor. Psychiatric: She has a normal mood and affect.  Her behavior is normal. Judgment and thought content normal.   Note written by Flako Lemons, as dictated by Alena Livingston MD 2:19 PM MDM  Number of Diagnoses or Management Options  Peritonitis [facility]):      Amount and/or Complexity of Data Reviewed  Clinical lab tests: ordered and reviewed  Tests in the radiology section of CPT®: ordered and reviewed  Review and summarize past medical records: yes  Discuss the patient with other providers: yes  Independent visualization of images, tracings, or specimens: yes    Risk of Complications, Morbidity, and/or Mortality  Presenting problems: moderate  Diagnostic procedures: moderate  Management options: moderate    Patient Progress  Patient progress: stable        ED Course       Paracentesis for fluid culture  Date/Time: 3/13/2018 2:08 PM  Performed by: Catalina Chandler by: Bora Kearney     Consent:     Consent obtained:  Verbal    Consent given by:  Patient    Risks discussed:  Bleeding, infection and pain  Indications:     Indications:  Fluid collection for evaluation of SBP  Pre-procedure details:     Skin preparation:  ChloraPrep  Sedation:     Sedation type: Anxiolysis  Anesthesia (see MAR for exact dosages): Anesthesia method:  Local infiltration    Local anesthetic:  Lidocaine 1% w/o epi  Post-procedure details:     Patient tolerance of procedure: Tolerated well, no immediate complications        4:67 PM  CT scan shows large amount of intraperitoneal free fluid, subcutaneous edema, concern for peritonitis, consult hospitalist for admission. CONSULT NOTE:    2:10 PM  Patient is being admitted to the hospital.  The results of their tests and reasons for their admission have been discussed with them and/or available family. They convey agreement and understanding for the need to be admitted and for their admission diagnosis. Consultation has been made with the inpatient physician specialist for hospitalization.     LABORATORY TESTS:  Recent Results (from the past 12 hour(s))   CBC W/O DIFF    Collection Time: 03/13/18  4:25 AM   Result Value Ref Range    WBC 6.5 3.6 - 11.0 K/uL    RBC 3.37 (L) 3.80 - 5.20 M/uL    HGB 8.4 (L) 11.5 - 16.0 g/dL    HCT 27.8 (L) 35.0 - 47.0 %    MCV 82.5 80.0 - 99.0 FL    MCH 24.9 (L) 26.0 - 34.0 PG    MCHC 30.2 30.0 - 36.5 g/dL    RDW 18.6 (H) 11.5 - 14.5 %    PLATELET 312 (L) 092 - 400 K/uL    NRBC 0.0 0  WBC    ABSOLUTE NRBC 0.00 0.00 - 0.01 K/uL   MAGNESIUM    Collection Time: 03/13/18  4:25 AM   Result Value Ref Range    Magnesium 1.7 1.6 - 2.4 mg/dL   PHOSPHORUS    Collection Time: 03/13/18  4:25 AM   Result Value Ref Range    Phosphorus 3.0 2.6 - 4.7 MG/DL   METABOLIC PANEL, COMPREHENSIVE    Collection Time: 03/13/18  4:25 AM   Result Value Ref Range    Sodium 136 136 - 145 mmol/L    Potassium 4.3 3.5 - 5.1 mmol/L    Chloride 98 97 - 108 mmol/L    CO2 28 21 - 32 mmol/L    Anion gap 10 5 - 15 mmol/L    Glucose 123 (H) 65 - 100 mg/dL    BUN 31 (H) 6 - 20 MG/DL    Creatinine 4.92 (H) 0.55 - 1.02 MG/DL    BUN/Creatinine ratio 6 (L) 12 - 20      GFR est AA 12 (L) >60 ml/min/1.73m2    GFR est non-AA 10 (L) >60 ml/min/1.73m2    Calcium 8.6 8.5 - 10.1 MG/DL    Bilirubin, total 0.8 0.2 - 1.0 MG/DL    ALT (SGPT) <6 (L) 12 - 78 U/L    AST (SGOT) 12 (L) 15 - 37 U/L    Alk. phosphatase 73 45 - 117 U/L    Protein, total 6.7 6.4 - 8.2 g/dL    Albumin 1.3 (L) 3.5 - 5.0 g/dL    Globulin 5.4 (H) 2.0 - 4.0 g/dL    A-G Ratio 0.2 (L) 1.1 - 2.2     GLUCOSE, POC    Collection Time: 03/13/18  6:17 AM   Result Value Ref Range    Glucose (POC) 122 (H) 65 - 100 mg/dL    Performed by Juan Childers PCT    GLUCOSE, POC    Collection Time: 03/13/18 11:53 AM   Result Value Ref Range    Glucose (POC) 112 (H) 65 - 100 mg/dL    Performed by JESS QUINTANA        IMAGING RESULTS:  DUPLEX LOWER EXT VENOUS BILAT   Final Result      CT ABD PELV WO CONT   Final Result      XR CHEST PORT   Final Result        No results found.     MEDICATIONS GIVEN:  Medications   pantoprazole (PROTONIX) tablet 40 mg (40 mg Oral Given 3/13/18 0656)   allopurinol (ZYLOPRIM) tablet 100 mg (100 mg Oral Given 3/13/18 0916)   cyanocobalamin (VITAMIN B12) tablet 2,500 mcg (2,500 mcg Oral Given 3/12/18 0910)   hydroxychloroquine (PLAQUENIL) tablet 200 mg (200 mg Oral Given 3/13/18 0916)   albuterol (PROVENTIL VENTOLIN) nebulizer solution 2.5 mg (not administered)   azaTHIOprine (IMURAN) tablet 50 mg (50 mg Oral Given 3/13/18 0920)   apixaban (ELIQUIS) tablet 5 mg (5 mg Oral Given 3/13/18 0916)   gemfibrozil (LOPID) tablet 300 mg (300 mg Oral Given 3/13/18 0916)   oxyCODONE IR (ROXICODONE) tablet 2.5-5 mg (5 mg Oral Given 3/13/18 1030)   amitriptyline (ELAVIL) tablet 25 mg (25 mg Oral Given 3/12/18 2200)   sodium chloride (NS) flush 5-10 mL (10 mL IntraVENous Given 3/13/18 0655)   sodium chloride (NS) flush 5-10 mL (10 mL IntraVENous Given 3/12/18 2031)   ondansetron (ZOFRAN) injection 4 mg (not administered)   piperacillin-tazobactam (ZOSYN) 3.375 g in 0.9% sodium chloride (MBP/ADV) 100 mL (3.375 g IntraVENous New Bag 3/13/18 0405)   fluconazole (DIFLUCAN) 200mg/100 mL IVPB (premix) (200 mg IntraVENous New Bag 3/12/18 2030)   HYDROmorphone (DILAUDID) injection 0.5 mg (0.5 mg IntraVENous Given 3/13/18 0405)   arformoterol (BROVANA) neb solution 15 mcg (0 mcg Nebulization Held 3/13/18 0800)     And   budesonide (PULMICORT) 500 mcg/2 ml nebulizer suspension (0 mcg Nebulization Held 3/13/18 0800)   Vancomycin - pharmacy to dose (not administered)   glucose chewable tablet 16 g (not administered)   dextrose (D50W) injection syrg 12.5-25 g (not administered)   glucagon (GLUCAGEN) injection 1 mg (not administered)   epoetin pia (EPOGEN;PROCRIT) 14,000 Units (not administered)   vancomycin (VANCOCIN) 500 mg in 0.9% sodium chloride (MBP/ADV) 100 mL (not administered)   insulin lispro (HUMALOG) injection (0 Units SubCUTAneous Held 3/13/18 3780)   predniSONE (DELTASONE) tablet 5 mg (5 mg Oral Given 3/13/18 4581)   sodium chloride 0.9 % bolus infusion 250 mL (0 mL IntraVENous IV Completed 3/11/18 2515) piperacillin-tazobactam (ZOSYN) 4.5 g in 0.9% sodium chloride (MBP/ADV) 100 mL (0 g IntraVENous IV Completed 3/11/18 1810)   HYDROmorphone (PF) (DILAUDID) injection 0.5 mg (0.5 mg IntraVENous Given 3/11/18 1636)   lidocaine (XYLOCAINE) 10 mg/mL (1 %) injection 10 mL (10 mL IntraDERMal Given by Provider 3/11/18 1706)   senna (SENOKOT) tablet 8.6 mg (8.6 mg Oral Given 3/11/18 2109)   vancomycin (VANCOCIN) 1500 mg in  ml infusion (0 mg IntraVENous IV Completed 3/11/18 2245)   dextrose (D50W) injection syrg 25 g (25 g IntraVENous Given 3/12/18 1192)   vancomycin random level Monday 3/12 at 6pm ( Other Given 3/12/18 1822)       IMPRESSION:  1. Peritonitis ([facility]        PLAN:  1.  Admit to Jesusita Soriano MD

## 2018-03-11 NOTE — ED TRIAGE NOTES
Pt reports generalized abdominal pain that began this pat Wednesday. Pt denies N/V/D. Pt is currently receiving dialysis every TTS.

## 2018-03-12 LAB
ALBUMIN SERPL-MCNC: 1.3 G/DL (ref 3.5–5)
ALBUMIN/GLOB SERPL: 0.2 {RATIO} (ref 1.1–2.2)
ALP SERPL-CCNC: 71 U/L (ref 45–117)
ALT SERPL-CCNC: 6 U/L (ref 12–78)
ANION GAP SERPL CALC-SCNC: 9 MMOL/L (ref 5–15)
AST SERPL-CCNC: 19 U/L (ref 15–37)
ATRIAL RATE: 92 BPM
BASOPHILS # BLD: 0 K/UL (ref 0–0.1)
BASOPHILS NFR BLD: 0 % (ref 0–1)
BILIRUB SERPL-MCNC: 1.3 MG/DL (ref 0.2–1)
BUN SERPL-MCNC: 17 MG/DL (ref 6–20)
BUN/CREAT SERPL: 4 (ref 12–20)
CALCIUM SERPL-MCNC: 8.5 MG/DL (ref 8.5–10.1)
CALCULATED P AXIS, ECG09: 36 DEGREES
CALCULATED R AXIS, ECG10: 44 DEGREES
CALCULATED T AXIS, ECG11: 56 DEGREES
CHLORIDE SERPL-SCNC: 98 MMOL/L (ref 97–108)
CO2 SERPL-SCNC: 31 MMOL/L (ref 21–32)
CORTIS SERPL-MCNC: 45.3 UG/DL
CREAT SERPL-MCNC: 4.03 MG/DL (ref 0.55–1.02)
DIAGNOSIS, 93000: NORMAL
DIFFERENTIAL METHOD BLD: ABNORMAL
EOSINOPHIL # BLD: 0 K/UL (ref 0–0.4)
EOSINOPHIL NFR BLD: 0 % (ref 0–7)
ERYTHROCYTE [DISTWIDTH] IN BLOOD BY AUTOMATED COUNT: 18.6 % (ref 11.5–14.5)
GLOBULIN SER CALC-MCNC: 5.6 G/DL (ref 2–4)
GLUCOSE BLD STRIP.AUTO-MCNC: 128 MG/DL (ref 65–100)
GLUCOSE BLD STRIP.AUTO-MCNC: 143 MG/DL (ref 65–100)
GLUCOSE BLD STRIP.AUTO-MCNC: 57 MG/DL (ref 65–100)
GLUCOSE BLD STRIP.AUTO-MCNC: 92 MG/DL (ref 65–100)
GLUCOSE BLD STRIP.AUTO-MCNC: 98 MG/DL (ref 65–100)
GLUCOSE SERPL-MCNC: 59 MG/DL (ref 65–100)
HCT VFR BLD AUTO: 29.2 % (ref 35–47)
HGB BLD-MCNC: 8.8 G/DL (ref 11.5–16)
IMM GRANULOCYTES # BLD: 0 K/UL
IMM GRANULOCYTES NFR BLD AUTO: 0 %
LYMPHOCYTES # BLD: 0.5 K/UL (ref 0.8–3.5)
LYMPHOCYTES NFR BLD: 14 % (ref 12–49)
MCH RBC QN AUTO: 25.7 PG (ref 26–34)
MCHC RBC AUTO-ENTMCNC: 30.1 G/DL (ref 30–36.5)
MCV RBC AUTO: 85.4 FL (ref 80–99)
METAMYELOCYTES NFR BLD MANUAL: 1 %
MONOCYTES # BLD: 0.3 K/UL (ref 0–1)
MONOCYTES NFR BLD: 7 % (ref 5–13)
NEUTS BAND NFR BLD MANUAL: 12 % (ref 0–6)
NEUTS SEG # BLD: 2.8 K/UL (ref 1.8–8)
NEUTS SEG NFR BLD: 66 % (ref 32–75)
NRBC # BLD: 0 K/UL (ref 0–0.01)
NRBC BLD-RTO: 0 PER 100 WBC
P-R INTERVAL, ECG05: 104 MS
PLATELET # BLD AUTO: 105 K/UL (ref 150–400)
PMV BLD AUTO: 12.8 FL (ref 8.9–12.9)
POTASSIUM SERPL-SCNC: 4 MMOL/L (ref 3.5–5.1)
PROT SERPL-MCNC: 6.9 G/DL (ref 6.4–8.2)
Q-T INTERVAL, ECG07: 342 MS
QRS DURATION, ECG06: 80 MS
QTC CALCULATION (BEZET), ECG08: 422 MS
RBC # BLD AUTO: 3.42 M/UL (ref 3.8–5.2)
RBC MORPH BLD: ABNORMAL
SERVICE CMNT-IMP: ABNORMAL
SERVICE CMNT-IMP: NORMAL
SERVICE CMNT-IMP: NORMAL
SODIUM SERPL-SCNC: 138 MMOL/L (ref 136–145)
VANCOMYCIN SERPL-MCNC: 26.6 UG/ML
VENTRICULAR RATE, ECG03: 92 BPM
WBC # BLD AUTO: 3.6 K/UL (ref 3.6–11)
WBC MORPH BLD: ABNORMAL

## 2018-03-12 PROCEDURE — 74011000250 HC RX REV CODE- 250

## 2018-03-12 PROCEDURE — 94640 AIRWAY INHALATION TREATMENT: CPT

## 2018-03-12 PROCEDURE — 36415 COLL VENOUS BLD VENIPUNCTURE: CPT | Performed by: HOSPITALIST

## 2018-03-12 PROCEDURE — 80053 COMPREHEN METABOLIC PANEL: CPT | Performed by: HOSPITALIST

## 2018-03-12 PROCEDURE — 82533 TOTAL CORTISOL: CPT | Performed by: HOSPITALIST

## 2018-03-12 PROCEDURE — 74011000250 HC RX REV CODE- 250: Performed by: HOSPITALIST

## 2018-03-12 PROCEDURE — 74011000258 HC RX REV CODE- 258: Performed by: HOSPITALIST

## 2018-03-12 PROCEDURE — 82962 GLUCOSE BLOOD TEST: CPT

## 2018-03-12 PROCEDURE — 93970 EXTREMITY STUDY: CPT

## 2018-03-12 PROCEDURE — 74011250637 HC RX REV CODE- 250/637: Performed by: HOSPITALIST

## 2018-03-12 PROCEDURE — 77030029684 HC NEB SM VOL KT MONA -A

## 2018-03-12 PROCEDURE — 85025 COMPLETE CBC W/AUTO DIFF WBC: CPT | Performed by: HOSPITALIST

## 2018-03-12 PROCEDURE — 74011250636 HC RX REV CODE- 250/636: Performed by: HOSPITALIST

## 2018-03-12 PROCEDURE — 80202 ASSAY OF VANCOMYCIN: CPT | Performed by: HOSPITALIST

## 2018-03-12 PROCEDURE — 65660000000 HC RM CCU STEPDOWN

## 2018-03-12 RX ORDER — INSULIN LISPRO 100 [IU]/ML
INJECTION, SOLUTION INTRAVENOUS; SUBCUTANEOUS EVERY 6 HOURS
Status: DISCONTINUED | OUTPATIENT
Start: 2018-03-12 | End: 2018-03-12

## 2018-03-12 RX ORDER — DEXTROSE 50 % IN WATER (D50W) INTRAVENOUS SYRINGE
50
Status: COMPLETED | OUTPATIENT
Start: 2018-03-12 | End: 2018-03-12

## 2018-03-12 RX ORDER — INSULIN LISPRO 100 [IU]/ML
INJECTION, SOLUTION INTRAVENOUS; SUBCUTANEOUS
Status: DISCONTINUED | OUTPATIENT
Start: 2018-03-12 | End: 2018-03-29

## 2018-03-12 RX ORDER — MAGNESIUM SULFATE 100 %
4 CRYSTALS MISCELLANEOUS AS NEEDED
Status: DISCONTINUED | OUTPATIENT
Start: 2018-03-12 | End: 2018-04-20 | Stop reason: HOSPADM

## 2018-03-12 RX ORDER — DEXTROSE 50 % IN WATER (D50W) INTRAVENOUS SYRINGE
Status: COMPLETED
Start: 2018-03-12 | End: 2018-03-12

## 2018-03-12 RX ORDER — PREDNISONE 5 MG/1
5 TABLET ORAL DAILY
Status: DISCONTINUED | OUTPATIENT
Start: 2018-03-13 | End: 2018-04-20 | Stop reason: HOSPADM

## 2018-03-12 RX ORDER — DEXTROSE 50 % IN WATER (D50W) INTRAVENOUS SYRINGE
12.5-25 AS NEEDED
Status: DISCONTINUED | OUTPATIENT
Start: 2018-03-12 | End: 2018-04-20 | Stop reason: HOSPADM

## 2018-03-12 RX ADMIN — OXYCODONE HYDROCHLORIDE 5 MG: 5 TABLET ORAL at 23:51

## 2018-03-12 RX ADMIN — HYDROMORPHONE HYDROCHLORIDE 0.5 MG: 2 INJECTION INTRAMUSCULAR; INTRAVENOUS; SUBCUTANEOUS at 08:42

## 2018-03-12 RX ADMIN — PANTOPRAZOLE SODIUM 40 MG: 40 TABLET, DELAYED RELEASE ORAL at 09:10

## 2018-03-12 RX ADMIN — GEMFIBROZIL 300 MG: 600 TABLET ORAL at 09:10

## 2018-03-12 RX ADMIN — AMITRIPTYLINE HYDROCHLORIDE 25 MG: 10 TABLET, FILM COATED ORAL at 22:00

## 2018-03-12 RX ADMIN — PIPERACILLIN SODIUM,TAZOBACTAM SODIUM 3.38 G: 3; .375 INJECTION, POWDER, FOR SOLUTION INTRAVENOUS at 15:12

## 2018-03-12 RX ADMIN — FLUCONAZOLE 200 MG: 2 INJECTION, SOLUTION INTRAVENOUS at 20:30

## 2018-03-12 RX ADMIN — Medication 10 ML: at 14:48

## 2018-03-12 RX ADMIN — AZATHIOPRINE 50 MG: 50 TABLET ORAL at 09:40

## 2018-03-12 RX ADMIN — PIPERACILLIN SODIUM,TAZOBACTAM SODIUM 3.38 G: 3; .375 INJECTION, POWDER, FOR SOLUTION INTRAVENOUS at 04:26

## 2018-03-12 RX ADMIN — BUDESONIDE 500 MCG: 0.5 INHALANT RESPIRATORY (INHALATION) at 22:08

## 2018-03-12 RX ADMIN — Medication 2500 MCG: at 09:10

## 2018-03-12 RX ADMIN — HYDROXYCHLOROQUINE SULFATE 200 MG: 200 TABLET, FILM COATED ORAL at 09:10

## 2018-03-12 RX ADMIN — METHYLPREDNISOLONE SODIUM SUCCINATE 40 MG: 40 INJECTION, POWDER, FOR SOLUTION INTRAMUSCULAR; INTRAVENOUS at 07:52

## 2018-03-12 RX ADMIN — METHYLPREDNISOLONE SODIUM SUCCINATE 40 MG: 40 INJECTION, POWDER, FOR SOLUTION INTRAMUSCULAR; INTRAVENOUS at 12:11

## 2018-03-12 RX ADMIN — HYDROMORPHONE HYDROCHLORIDE 0.5 MG: 2 INJECTION INTRAMUSCULAR; INTRAVENOUS; SUBCUTANEOUS at 15:03

## 2018-03-12 RX ADMIN — BUDESONIDE 500 MCG: 0.5 INHALANT RESPIRATORY (INHALATION) at 09:26

## 2018-03-12 RX ADMIN — ARFORMOTEROL TARTRATE 15 MCG: 15 SOLUTION RESPIRATORY (INHALATION) at 09:26

## 2018-03-12 RX ADMIN — Medication 10 ML: at 05:47

## 2018-03-12 RX ADMIN — HYDROMORPHONE HYDROCHLORIDE 0.5 MG: 2 INJECTION INTRAMUSCULAR; INTRAVENOUS; SUBCUTANEOUS at 03:09

## 2018-03-12 RX ADMIN — DEXTROSE 50 % IN WATER (D50W) INTRAVENOUS SYRINGE 25 G: at 05:44

## 2018-03-12 RX ADMIN — HYDROMORPHONE HYDROCHLORIDE 0.5 MG: 2 INJECTION INTRAMUSCULAR; INTRAVENOUS; SUBCUTANEOUS at 20:30

## 2018-03-12 RX ADMIN — ARFORMOTEROL TARTRATE 15 MCG: 15 SOLUTION RESPIRATORY (INHALATION) at 22:08

## 2018-03-12 RX ADMIN — APIXABAN 5 MG: 5 TABLET, FILM COATED ORAL at 17:56

## 2018-03-12 RX ADMIN — HYDROXYCHLOROQUINE SULFATE 200 MG: 200 TABLET, FILM COATED ORAL at 17:55

## 2018-03-12 RX ADMIN — METHYLPREDNISOLONE SODIUM SUCCINATE 40 MG: 40 INJECTION, POWDER, FOR SOLUTION INTRAMUSCULAR; INTRAVENOUS at 00:30

## 2018-03-12 RX ADMIN — Medication 10 ML: at 20:31

## 2018-03-12 RX ADMIN — APIXABAN 5 MG: 5 TABLET, FILM COATED ORAL at 09:10

## 2018-03-12 RX ADMIN — DEXTROSE MONOHYDRATE 25 G: 500 INJECTION PARENTERAL at 05:44

## 2018-03-12 RX ADMIN — Medication 10 ML: at 22:01

## 2018-03-12 RX ADMIN — ALLOPURINOL 100 MG: 100 TABLET ORAL at 09:10

## 2018-03-12 NOTE — ROUTINE PROCESS
TRANSFER - OUT REPORT:    Verbal report given to DANITZA Wright(name) on Angella Jimenez  being transferred to OBS(unit) for routine progression of care       Report consisted of patients Situation, Background, Assessment and   Recommendations(SBAR). Information from the following report(s) SBAR, ED Summary, [place] and Cardiac Rhythm nsr was reviewed with the receiving nurse. Lines:   Peripheral IV 03/11/18 Left Antecubital (Active)   Site Assessment Clean, dry, & intact 3/11/2018  1:58 PM   Phlebitis Assessment 0 3/11/2018  1:58 PM   Infiltration Assessment 0 3/11/2018  1:58 PM   Dressing Status Clean, dry, & intact 3/11/2018  1:58 PM   Dressing Type Transparent 3/11/2018  1:58 PM   Hub Color/Line Status Pink;Flushed;Patent 3/11/2018  1:58 PM   Action Taken Blood drawn 3/11/2018  1:58 PM        Opportunity for questions and clarification was provided.       Patient transported with:   Monitor   Visit Vitals    /67 (BP 1 Location: Left arm, BP Patient Position: At rest)    Pulse 96    Temp 98.2 °F (36.8 °C)    Resp 14    Ht 5' 5\" (1.651 m)    Wt 65.3 kg (144 lb)    SpO2 99%    BMI 23.96 kg/m2

## 2018-03-12 NOTE — ED NOTES
Patient BG 59 from [facility]. Checked POC glucose. Given 25g D50 per protocol. Will recheck. Paged Dr. Yoana Becerra. Awaiting callback.

## 2018-03-12 NOTE — PROGRESS NOTES
Pharmacist Note - Vancomycin Dosing (Hemodialysis Patient)    Consult provided for this 29 y.o. female for indication of intra-abdominal infection. This patient is also on the following antibiotic regimen(s): Vanc + Zosyn + Fluconazole    Lab Results   Component Value Date/Time    Creatinine 3.68 (H) 2018 01:42 PM    Creatinine (POC) 5.5 (H) 2018 12:30 PM    WBC 2.5 (L) 2018 01:42 PM    BUN 13 2018 01:42 PM    BUN (POC) 24 (H) 2018 12:30 PM   Temp (24hrs), Av.3 °F (36.8 °C), Min:98.3 °F (36.8 °C), Max:98.3 °F (36.8 °C)      Estimated CrCl:  ESRD on HD (Tuesday/Thursday/Saturday)  Cultures:  3/11 blood - pending  3/11 ascitic fluid - pending    For this HD patient, will initiate therapy with a loading dose of Vancomycin 1500 mg, to be followed with a dose of 500 mg after each HD session. Dose will be adjusted to maintain a pre-HD trough of approximately 15 - 20 mcg/mL for therapeutic goal of 10 - 15 mcg/mL as approximately 35% of drug will be removed by HD filtration. Pharmacy to follow patient daily and order levels / make dose adjustments as appropriate.

## 2018-03-12 NOTE — PROGRESS NOTES
Patient is a 28 y/o -American female insured by [facility] A[facility] ([employer] ANGELICA secondary), admitted to [facility] 3/11/18 for c/o fevers & abdominal pain. Recently started tx for E. Coli bacteremia. PMH lupus, ESRD on TTS HD. ID consult pending. CM visited patient at bedside. Patient A&Ox4, on room air, sitting up at bedside. Patient confirmed demographics on facesheet. Patient lives with family in [address] [place] [address], has family as paid personal care aides 5hrs/day, attends [facility] for dialysis. Patient denies home DME, family provides transport to dialysis and appointments. Patient denied further questions/concerns for this writer. Anticipate return home with family when medically clear. RRAT 21, patient does not have any core measure dx for [facility]. Will have PCP follow-up scheduled when discharge date determined. CM to follow for additional discharge needs as ordered. Berenda Monday, MSW    Care Management Interventions  PCP Verified by CM:  Yes  Last Visit to PCP: 12/08/17  Malathihart Signup: No  Discharge Durable Medical Equipment: No  Physical Therapy Consult: No  Occupational Therapy Consult: No  Speech Therapy Consult: No  Current Support Network: Relative's Home  Confirm Follow Up Transport: Family  Plan discussed with Pt/Family/Caregiver: Yes  Discharge Location  Discharge Placement: Home

## 2018-03-12 NOTE — PROGRESS NOTES
TRANSFER - IN REPORT:    Verbal report received from [facility] on Angella Jimenez  being received from ED for routine progression of care      Report consisted of patients Situation, Background, Assessment and   Recommendations(SBAR). Information from the following report(s) SBAR, ED Summary, Intake/Output, MAR, Recent Results and Cardiac Rhythm NSR was reviewed with the receiving nurse. Opportunity for questions and clarification was provided. Assessment completed upon patients arrival to unit and care assumed.

## 2018-03-12 NOTE — H&P
[facility]  HISTORY AND PHYSICAL      Yaquelin Iverson.  MR#: [id number]  : 1985  ACCOUNT #: [de-identified]   ADMIT DATE: 2018    ADMITTED: 6:10 p.m. ADMITTING ATTENDING:  Fidencio Eagle MD     PRIMARY CARE PHYSICIAN:  Dr. Brennan Wang:  Abdominal pain. HISTORY OF PRESENT ILLNESS:  This is a 23-year-old female with multiple medical problems including SLE, end-stage renal disease (on hemodialysis, TTS), pulmonary embolism, on Eliquis (), hyperlipidemia, hypertension, recent Candida peritonitis, chronic bilateral pain. She comes over here because of abdominal pain since last 4 or 5 days. The patient claims that last Wednesday, that is about 4 days ago, the patient started having fever and at that time, she noted that her temperature was 100.4 degrees Fahrenheit. At the same time, she also started having abdominal pain. She describes her pain as throughout the whole abdomen, sharp 7/10 to 9/10 in intensity off and on, nonradiating, slightly gets better with pain medication, but there was no triggering factor whatsoever. In any case, the next day the patient went for dialysis. She claims that over there, they did the blood cultures and put her on antibiotics. She was told that in the blood culture, there was a growth of bacteria, but she does not remember the name of the bacteria. She also does not remember the name of the antibiotic as well. Please also note that recently the patient was switched to hemodialysis and subsequently received a graft. Last Wednesday, her dialysis catheter was taken out. When the patient's abdominal pain continued then today she decided to come into the hospital.  The patient claims that she did not have any other symptoms including chest pain, shortness of breath, cough, nausea, vomiting, headache, dizziness. No changes in bowel movements. REVIEW OF SYSTEMS:  Pertinent positives as above.   Rest of the review of systems was done. They were all negative except for above. PAST MEDICAL HISTORY:  1. Anemia. 2.  Asthma. 3.  End-stage renal disease, on hemodialysis. 4.  Chronic pain. 5.  Sciatica. 6.  GERD. 7.  Hypercholesterolemia. 8.  Hypertension. 9. SLE. 10.  PE.    PAST SURGICAL HISTORY:    1.  section. 2.  Graft 2017. FAMILY HISTORY:  Significant for diabetes, hypertension, and breast cancer. SOCIAL HISTORY:  Nonsmoker, nonalcoholic, non-IV drug abuser. ALLERGIES:  THE PATIENT IS ALLERGIC TO COMPAZINE, WHICH GIVES HER PALPITATIONS AND NAUSEA AND VOMITING. HOME MEDICATIONS:  The patient is on following medications:  1. Albuterol 1-2 puffs q.4 hours p.r.n.  2.  Allopurinol 100 mg p.o. daily. 3.  Elavil 25 mg p.o. at bedtime. 4.  Eliquis 5 mg p.o. daily. 5.  Imuran 50 mg p.o. daily. 6.  Coreg 12.5 mg p.o. b.i.d.  7.  Vitamin B12, 2500 mcg sublingually daily. 8.  Lopid 600 mg tablet 300 mg daily. 9.  Plaquenil 200 mg p.o. b.i.d. 10.  Roxicodone 5 mg 1 tablet in the morning and half tablet in the evening. 11.  Protonix 40 mg p.o. daily. 12.  Prednisone 5 mg p.o. daily. 13.  Senna 8.6 mg p.o. p.r.n. PHYSICAL EXAMINATION:    VITAL SIGNS:  At the time the patient was seen, the patient's vitals are as follows:  Blood pressure 92/63, pulse 91, respiratory rate 16, saturating 98% on room air. GENERAL:  Not in acute distress, comfortably lying in bed. HEENT:  Head normocephalic, atraumatic. Eyes:  PERRLA, EOMI. Ears:  Bilateral hearing normal, no growth. Nose:  No polyp, no bleeding. Mouth:  Dry mucus membranes. Decent oral hygiene. NECK:  Supple, no JVD, no thyromegaly. RESPIRATORY:  Clear to auscultation bilaterally. No adventitious breath sounds. CARDIOVASCULAR:  S1, S2 normal.  No murmurs, rubs, or gallops. GASTROINTESTINAL:  Bowel sounds are present, but slightly distended and firm. No rebound, no guarding.   Please note that the patient just had paracentesis done by the ER physician. NEUROLOGIC:  Cranial nerves II-XII intact. Motor 5/5 bilateral upper limb and lower limb. Sensory normal.  PSYCHIATRIC:  Mood and affect appropriate. Alert, awake, oriented x3. EXTREMITIES:  Bilateral lower extremities are 2+ -3+ edema, more on the left side than on the right side. There is slight tenderness on the left leg as well noted. SKIN:  No rashes, no ulcers. LABORATORY AND RADIOLOGICAL RESULTS:  WBC 2.5, hemoglobin 9.2, hematocrit 30.9, platelet count of 599. Sodium 138, potassium 3.7, chloride 97, bicarbonate 34, creatinine 3.6, BUN 13, total bilirubin of 1.3, albumin of 1.5, globulin 5.9, lactic acid of 1.5. Peritoneal fluid has been collected and sent for studies. A chest x-ray was done which did not show any acute thoracic disease. CT scan of the abdomen was done, which showed large amount of free intraperitoneal fluid, appears somewhat loculated. The patient had a peritoneal dialysis catheter removed. There is subcutaneous edema, loculation and enhancement of the peritoneal margin to suggest possible peritonitis. Extensive subcutaneous edema, atrophic kidneys, severe hepatic steatosis. ASSESSMENT AND PLAN:  This is a 29-year-old [employer] [employer] female with a past medical history of SLE, end-stage renal disease, on hemodialysis (TTS), severe protein calorie malnutrition, pulmonary embolism, on Eliquis, hyperlipidemia, hypertension, recent Candida peritonitis, sciatica. She comes over here with fever and abdominal pain and recent reported bacteremia and in the ER, is found to be hypotensive. 1.  Severe sepsis secondary to bacteremia versus peritonitis. I would admit the patient as inpatient. We will admit in ICU. We will put the patient on vancomycin, Zosyn, and fluconazole. Please note that the patient was recently admitted from 12/12/2017 to 12/22/2017 for Candida peritonitis. I will also consult ID regarding the same.   2. Hypotension secondary to above. I would get cortisol level in the morning and will also put the patient on stress dose steroids. 3.  Bilateral pedal edema. The patient claims that it is not usual for her to have pedal edema. I would get a venous Doppler on the patient, especially with a history of pulmonary embolism. 4.  History of pulmonary embolism. The patient claims that she was diagnosed to have pulmonary embolism in 2012. She did not have any other episode of deep venous thrombosis or PE. She does not remember why she had PE, but tomorrow we can talk to the patient's primary care physician and if it is not indicated, we might take the patient off anticoagulation, but for now, we will continue that. 5.  SLE. I will continue the patient's home medication. 6.  End-stage renal disease, on hemodialysis on Tuesdays, Thursdays, and Saturdays. Last dialysis that the patient had was yesterday and the patient claims that she was given antibiotic and she did fine at the time  7. Hypertension, I will continue the patient's home medication. 8.  Severe protein calorie malnutrition. I will consult nutritionist once the patient is more stable. I spent about 52 minutes in taking care of the patient.       Michell Adam MD       PG/TN  D: 03/11/2018 18:28     T: 03/11/2018 20:33  JOB #: [id number]

## 2018-03-12 NOTE — PROGRESS NOTES
Admission Medication Reconciliation:    Information obtained from: rx query, patient    Significant PMH/Disease States:   Past Medical History:   Diagnosis Date    Anemia     secondary to lupus    Asthma     no inhaler use in past 2 to 3 years    Carditis     Chronic kidney disease     ESRD    Chronic pain     DDD (degenerative disc disease), lumbar     [facility]) [facility]     GERD (gastroesophageal reflux disease)     Heart failure ([facility]     Hemodialysis patient ([facility] 12/21/2017    [address]  Tuesday,  Thursday,  and Saturday.  Hypercholesterolemia     Hypertension     Intractable nausea and vomiting 10/21/2015    Long term (current) use of anticoagulants     Lupus     Lupus (systemic lupus erythematosus) (HCC)     Malignant hypertension with chronic kidney disease stage V ([facility]     Peritoneal dialysis status ([facility] 10/2015    x 2 years Stopped 12/2017 due to infection and removed.  Poor historian 01/17/2018    With medications    Thromboembolus [facility]) 2013    lungs    Transfusion history     Last Transfusion 12/21/2017  at [facility]       Chief Complaint for this Admission:  abd pain    Allergies:  Compazine [prochlorperazine edisylate]    Prior to Admission Medications:   Prior to Admission Medications   Prescriptions Last Dose Informant Patient Reported? Taking? albuterol (PROVENTIL HFA, VENTOLIN HFA, PROAIR HFA) 90 mcg/actuation inhaler  Self No    Sig: Take 1-2 Puffs by inhalation every four (4) hours as needed for Wheezing or Shortness of Breath   allopurinol (ZYLOPRIM) 100 mg tablet  Self Yes    Sig: Take 100 mg by mouth daily (after breakfast). Needs to TAKE on a full stomach; will cause her to be nauseated. amitriptyline (ELAVIL) 25 mg tablet   No    Sig: Take 1 Tab by mouth nightly. PRN   apixaban (ELIQUIS) 5 mg tablet  Self Yes    Sig: Take 5 mg by mouth every twelve (12) hours.    azaTHIOprine (IMURAN) 50 mg tablet  Self Yes    Sig: Take 50 mg by mouth daily (after breakfast). carvedilol (COREG) 6.25 mg tablet  Self Yes    Sig: Take 12.5 mg by mouth two (2) times daily (with meals). cyanocobalamin (VITAMIN B-12) 2,500 mcg sublingual tablet  Self No    Sig: Take 1 Tab by mouth daily. fluticasone-vilanterol (BREO ELLIPTA) 200-25 mcg/dose inhaler  Self No    Sig: Take 1 Puff by inhalation daily. Rinse mouth out after use   gemfibrozil (LOPID) 600 mg tablet  Self Yes    Sig: Take 300 mg by mouth daily. hydroxychloroquine (PLAQUENIL) 200 mg tablet  Self Yes    Sig: Take 200 mg by mouth two (2) times a day. oxyCODONE IR (ROXICODONE) 5 mg immediate release tablet   No    Sig: Take 1 tab in AM and HALF to 1 tab in PM if needed on days where back pain is severe    pantoprazole (PROTONIX) 40 mg tablet  Self No    Sig: Take 1 Tab by mouth Daily (before breakfast). predniSONE (DELTASONE) 5 mg tablet  Self Yes    Sig: Take 5 mg by mouth daily. senna (SENNA) 8.6 mg tablet  Self Yes    Sig: Take 1 Tab by mouth daily as needed. Facility-Administered Medications: None         Comments/Recommendations: No changes to the above made.

## 2018-03-12 NOTE — ED NOTES
Bedside and Verbal shift change report given to Norma Daniel (oncoming nurse) by Junior BOSCH (offgoing nurse). Report included the following information SBAR, Kardex, ED Summary and MAR.

## 2018-03-12 NOTE — PROGRESS NOTES
6955 Received patient to unit. VSS. Patient oriented to unit. Call bell within reach. [phone] Infectious disease consult called. Voicemail left.

## 2018-03-12 NOTE — PROCEDURES
[facility]  *** FINAL REPORT ***    Name: Mariella Santana  MRN: [id number]    Inpatient  : 1985  HIS Order #: [id number]  [id number] [facility] Visit #: [id number]  Date: 12 Mar 2018    TYPE OF TEST: Peripheral Venous Testing    REASON FOR TEST  Pain in limb, Limb swelling    Right Leg:-  Deep venous thrombosis:           No  Superficial venous thrombosis:    No  Deep venous insufficiency:        Not examined  Superficial venous insufficiency: Not examined    Left Leg:-  Deep venous thrombosis:           No  Superficial venous thrombosis:    No  Deep venous insufficiency:        Not examined  Superficial venous insufficiency: Not examined      INTERPRETATION/FINDINGS  PROCEDURE:  Color duplex ultrasound imaging of lower extremity veins. FINDINGS:       Right: The common femoral, deep femoral, femoral, popliteal,  posterior tibial, peroneal, and great saphenous are patent and without   evidence of thrombus;  each is fully compressible and there is no  narrowing of the flow channel on color Doppler imaging. Phasic flow  is observed in the common femoral vein. Left:   The common femoral, deep femoral, femoral, popliteal,  posterior tibial, peroneal, and great saphenous are patent and without   evidence of thrombus;  each is fully compressible and there is no  narrowing of the flow channel on color Doppler imaging. Phasic flow  is observed in the common femoral vein. IMPRESSION:  No evidence of right or left lower extremity vein  thrombosis. ADDITIONAL COMMENTS    I have personally reviewed the data relevant to the interpretation of  this  study. TECHNOLOGIST: Leartis Nyhan.  Nova Snyder  Signed: 2018 10:58 AM    PHYSICIAN: Joelle Dominguez MD  Signed: 2018 06:57 AM

## 2018-03-12 NOTE — CONSULTS
ID consult  NAME:  Rodrick Quintana                      :   1985                       MRN:   [id number]   Date/Time:  3/12/2018 3:56 PM  Subjective:   REASON FOR CONSULT:   Gram neg peritonitis/E.coli bacteremia      Rodrick Quintana  is a 29 y. 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 She took 4 weeks of fluconazole and was doing fine She had a graft placed on the rt arm on 19 - She developed fever and abdominal pain on 3/6 and blood culture sent from the catheter at dialysis center- The catheter was removed on 3/7 and the graft was accessed last week-   She was given IV gent  at the dialysis center. As the abdominal pain was getting worse she came to the ED on 3/11 and was admitted  CT abdomen showed Large amount of free intraperitoneal fluid that is  loculated. Multiple hospitalizations in the past few months  OCt 14- for left lower lobe pneumonia/effusion- was found to have PE on the rt side  10/27-10/30 possible pneumonia- same infiltrate left lower lobe- sent on augmentin    - for b/l leg swelling and abdominal swelling    17-17- fungal peritonitis- treated with fluconazole for 4 weeks    She had  peritoneal dialysis since - had cath placed in  until it was removed in Dec 2017  LMP 2 yrs ago  Was sexually active in 2017  HIV [address] negative   Last travel was to [place] in 2017  Medical history   Pericardial effusion  PE  Anemia  HTN  Hypoalbuminemia  Hypercholesterolemia  Candida parapsilosis peritonitis      Past Surgical History:   Procedure Laterality Date    HX  SECTION  11/2006    x1    HX OTHER SURGICAL  9/16/15    INSERTION PD CATH;  Removed 2017    HX VASCULAR ACCESS Right 12/2017    Double-Lumen henry catheter upper chest         Family History   Problem Relation Age of Onset    Diabetes Father     Hypertension Father     Cancer Other      aunt with breast cancer    Diabetes Mother      Allergies   Allergen Reactions    Compazine [Prochlorperazine Edisylate] Nausea and Vomiting and Palpitations     SH  Lives with her parents and 6year old daughter  Non smoker  No alcohol  No illicit drug use      Current Facility-Administered Medications   Medication Dose Route Frequency Provider Last Rate Last Dose    insulin lispro (HUMALOG) injection   SubCUTAneous Joselin Peterson MD   Stopped at 03/12/18 0734    glucose chewable tablet 16 g  4 Tab Oral PRN Shivam Bonds MD        dextrose (D50W) injection syrg 12.5-25 g  12.5-25 g IntraVENous PRN Shivam Bonds MD        glucagon (GLUCAGEN) injection 1 mg  1 mg IntraMUSCular PRN Shivam Bonds MD        [START ON 3/13/2018] epoetin pia (EPOGEN;PROCRIT) 14,000 Units  14,000 Units SubCUTAneous DIALYSIS [facility] Deep Ramy Gilbert MD        vancomycin random level Monday 3/12 at 6pm   Other Tyler Shelton MD        [START ON 3/13/2018] vancomycin (VANCOCIN) 500 mg in 0.9% sodium chloride (MBP/ADV) 100 mL  500 mg IntraVENous DIALYSIS [facility] Shivam Bonds MD        pantoprazole (PROTONIX) tablet 40 mg  40 mg Oral ACB Shivam Bonds MD   40 mg at 03/12/18 0910    allopurinol (ZYLOPRIM) tablet 100 mg  100 mg Oral DAILY AFTER BREAKFAST Shivam Bonds MD   100 mg at 03/12/18 0910    cyanocobalamin (VITAMIN B12) tablet 2,500 mcg  2,500 mcg Oral DAILY Shivam Bonds MD   2,500 mcg at 03/12/18 0910    hydroxychloroquine (PLAQUENIL) tablet 200 mg  200 mg Oral BID Shivam Bonds MD   200 mg at 03/12/18 0910    albuterol (PROVENTIL VENTOLIN) nebulizer solution 2.5 mg  2.5 mg Nebulization Q4H PRN Shivam Bonds MD        azaTHIOprine (IMURAN) tablet 50 mg  50 mg Oral DAILY AFTER BREAKFAST Shivam Bonds MD   50 mg at 03/12/18 0940    apixaban (ELIQUIS) tablet 5 mg  5 mg Oral BID Sindy Blakely MD   5 mg at 03/12/18 0910    gemfibrozil (LOPID) tablet 300 mg  300 mg Oral DAILY Sindy Blakely MD   300 mg at 03/12/18 0910    oxyCODONE IR (ROXICODONE) tablet 2.5-5 mg  2.5-5 mg Oral Q6H PRN Sindy Blakely MD        amitriptyline (ELAVIL) tablet 25 mg  25 mg Oral QHS Sindy Blakely MD   25 mg at 03/11/18 2109    sodium chloride (NS) flush 5-10 mL  5-10 mL IntraVENous Q8H Snidy Blakely MD   10 mL at 03/12/18 0547    sodium chloride (NS) flush 5-10 mL  5-10 mL IntraVENous PRN Sindy Blakely MD        0.9% sodium chloride infusion  50 mL/hr IntraVENous CONTINUOUS Sindy Blakely MD 50 mL/hr at 03/11/18 2033 50 mL/hr at 03/11/18 2033    ondansetron (ZOFRAN) injection 4 mg  4 mg IntraVENous Q4H PRN Sindy Blakely MD        piperacillin-tazobactam (ZOSYN) 3.375 g in 0.9% sodium chloride (MBP/ADV) 100 mL  3.375 g IntraVENous Q12H Sindy Blakely MD   Stopped at 03/12/18 0826    fluconazole (DIFLUCAN) 200mg/100 mL IVPB (premix)  200 mg IntraVENous Q24H Sindy Blakely MD   Stopped at 03/11/18 2245    methylPREDNISolone (PF) (SOLU-MEDROL) injection 40 mg  40 mg IntraVENous Q6H Sindy Blakely MD   40 mg at 03/12/18 1211    HYDROmorphone (DILAUDID) injection 0.5 mg  0.5 mg IntraVENous Q4H PRN Sindy Blakely MD   0.5 mg at 03/12/18 0842    arformoterol (BROVANA) neb solution 15 mcg  15 mcg Nebulization BID RT Sindy Blakely MD   15 mcg at 03/12/18 0407    And    budesonide (PULMICORT) 500 mcg/2 ml nebulizer suspension  500 mcg Nebulization BID RT Sindy Blakely MD   500 mcg at 03/12/18 0926    Vancomycin - pharmacy to dose   Other Rx Dosing/Monitoring Sindy Blakely MD            REVIEW OF SYSTEMS:     Const:  fever, chills, has gained 6 pounds since last time  Eyes:  negative diplopia or visual changes, negative eye pain  ENT:  negative coryza, negative sore throat  Resp:  No cough, no hemoptysis, dyspnea  Cards: no chest pain  Skin:  negative for rash and pruritus  Heme: negative for easy bruising and gum/nose bleeding  MS: Back pain  Neurolo:negative for headaches, dizziness, vertigo, memory problems       Objective:   VITALS:    Visit Vitals    /85 (BP 1 Location: Left arm, BP Patient Position: At rest)    Pulse 83    Temp 97.4 °F (36.3 °C)    Resp 18    Ht 5' 5\" (1.651 m)    Wt 150 lb (68 kg)    SpO2 95%    BMI 24.96 kg/m2     PHYSICAL EXAM:   General:    Alert, cooperative, no distress, . Chronically ill weak  Head:   Normocephalic, without obvious abnormality, atraumatic. Eyes:   Conjunctivae clear, anicteric sclerae. Pupils are equal  Nose:  Nares normal. No drainage or sinus tenderness. Throat:    Lips, mucosa, and tongue normal.  No Thrush  Neck:  Supple,   Back:    No CVA tenderness.   Lungs:   B/l air entry  crepts bases    Heart:   s1s2  Abdomen:   Firm swelling- tender  Extremities: Rt arm graft okay  Skin:     Dry skin- striae over arms/abdomen  Lymph: Cervical, supraclavicular normal.  Neurologic: Grossly non-focal    Pertinent Labs  Wbc 2.5>3,6   Hb 8.8    Lactate 1.5  Albumin 1.5  Peritoneal fluid- 3524 WBC ( 85% N)  Gram neg delfina    IMAGING RESULTS:      Impression/Recommendation  33 yr female with SLE/ lupus nephritis/HTN -h/o peritoneal dialysis until Dec 2017 /Candida peritonitis in Dec 2017/ treated with 4 weeks of fluconazole    Admitted with abdominal pain and fever of  5 days    Gram neg  peritonitis with e.coli bacteremia  Likely the gram neg delfina from peritoneal fluid is e.coli- await final ID  Because the fluid is loculated it will have to be drained completely- as this is more like abscess  Surgical consult   On vanco/zosyn and fluconazole- will de-escalate once culture finalizes    Thrombocytopenia-if it worsens will Dc zosyn and change to cefepime    Recently treated candida peritonitis in Dec 2017- that time she was on PD    SLE-on imuran/plaquenil and prednisone    HTN-now has hypotension    PE- on elaquis    Discussed with patient, requesting provider

## 2018-03-12 NOTE — PROGRESS NOTES
Hospitalist Progress Note  Jacklyn Elise MD  Answering service: [phone] from in house phone  Cell: [phone]      Date of Service:  3/12/2018  NAME:  Carmen Hopper  :  1985  MRN:  [id number]      Admission Summary: This is a 64-year-old female with multiple medical problems including SLE, end-stage renal disease (on hemodialysis, TTS), pulmonary embolism, on Eliquis (), hyperlipidemia, hypertension, recent Candida peritonitis, chronic bilateral pain. She comes over here because of abdominal pain since last 4 or 5 days.     The patient claims that last Wednesday, that is about 4 days ago, the patient started having fever and at that time, she noted that her temperature was 100.4 degrees Fahrenheit. At the same time, she also started having abdominal pain. She describes her pain as throughout the whole abdomen, sharp 7/10 to 9/10 in intensity off and on, nonradiating, slightly gets better with pain medication, but there was no triggering factor whatsoever. In any case, the next day the patient went for dialysis. She claims that over there, they did the blood cultures and put her on antibiotics. She was told that in the blood culture, there was a growth of bacteria, but she does not remember the name of the bacteria. She also does not remember the name of the antibiotic as well. Please also note that recently the patient was switched to hemodialysis and subsequently received a graft. Last Wednesday, her dialysis catheter was taken out. When the patient's abdominal pain continued then today she decided to come into the hospital.  The patient claims that she did not have any other symptoms including chest pain, shortness of breath, cough, nausea, vomiting, headache, dizziness.   No changes in bowel movements.       Interval history / Subjective:     F/u Abdominal pain   Abdominal pain improved  Assessment & Plan: Acute peritonitis  -recent candida peritonitis  -CT abd 3/11 Large amount of free intraperitoneal fluid appears somewhat loculated  -Appreciate discussion with ID  -Follow cultures  -On Vanc/Zosyn/Fluconazole  -May need surgery    E coli bacteremia/Sepsis  -Spoke to Dr Wesley Gold who told me that the patient was found to have E coli bacteremia 4 days ago    Hypotension  -now resolved  -Was on stress steroids  -Will switch to regular oral steroids    Bilateral pedal edema  -Dopplers negative for DVT    SLE  -continue home meds    History of PE  -on Eliquis since 2012  -not sure if she should be on eliquis for that long    ESRD  -on HD TTS    Anemia  -of chronic disease    Severe hepatic steatosis  -monitor    HTN  -stable    Severe protein calorie malnutrition  -Consult nutrition    Renal diet    Code status: FULL CODE  DVT prophylaxis: Eliquis  PTA: home    Plan: Follow ID, nephrology    Care Plan discussed with: Patient/Family  Disposition: Home w/Family     Hospital Problems  Date Reviewed: 3/11/2018          Codes Class Noted POA    * (Principal)Peritonitis ([facility] [address] ICD-10-CM: K65.9  ICD-9-CM: 567.9  3/11/2018 Yes                Review of Systems:   A comprehensive review of systems was negative except for that written in the HPI. Vital Signs:    Last 24hrs VS reviewed since prior progress note. Most recent are:  Visit Vitals    /73 (BP 1 Location: Left arm, BP Patient Position: At rest)    Pulse 92    Temp 98.1 °F (36.7 °C)    Resp 16    Ht 5' 5\" (1.651 m)    Wt 68 kg (150 lb)    SpO2 96%    BMI 24.96 kg/m2         Intake/Output Summary (Last 24 hours) at 03/12/18 1744  Last data filed at 03/12/18 1204   Gross per 24 hour   Intake              360 ml   Output                0 ml   Net              360 ml        Physical Examination:             Constitutional:  No acute distress, cooperative, pleasant    ENT:  Oral mucous moist, oropharynx benign. Neck supple,    Resp:  CTA bilaterally.  No wheezing/rhonchi/rales. No accessory muscle use   CV:  Regular rhythm, normal rate, no murmurs, gallops, rubs    GI:  Soft, non distended, non tender. normoactive bowel sounds, no hepatosplenomegaly     Musculoskeletal:  No edema, warm, 2+ pulses throughout    Neurologic:  Moves all extremities. AAOx3, CN II-XII reviewed     Skin:  Good turgor, no rashes or ulcers       Data Review:    Review and/or order of clinical lab test      Labs:     Recent Labs      03/12/18   0300  03/11/18   1342   WBC  3.6  2.5*   HGB  8.8*  9.2*   HCT  29.2*  30.9*   PLT  105*  110*     Recent Labs      03/12/18   0300  03/11/18   1342   NA  138  138   K  4.0  3.7   CL  98  97   CO2  31  34*   BUN  17  13   CREA  4.03*  3.68*   GLU  59*  71   CA  8.5  8.5     Recent Labs      03/12/18   0300  03/11/18   1342   SGOT  19  24   ALT  6*  6*   AP  71  73   TBILI  1.3*  1.3*   TP  6.9  7.4   ALB  1.3*  1.5*   GLOB  5.6*  5.9*   LPSE   --   27*     No results for input(s): INR, PTP, APTT in the last 72 hours. No lab exists for component: INREXT   No results for input(s): FE, TIBC, PSAT, FERR in the last 72 hours. Lab Results   Component Value Date/Time    Folate 3.9 11/06/2017 11:49 AM      No results for input(s): PH, PCO2, PO2 in the last 72 hours. No results for input(s): CPK, CKNDX, TROIQ in the last 72 hours.     No lab exists for component: CPKMB  Lab Results   Component Value Date/Time    Cholesterol, total 117 09/25/2015 02:02 PM    HDL Cholesterol 31 (L) 09/25/2015 02:02 PM    LDL, calculated 57 09/25/2015 02:02 PM    Triglyceride 146 09/25/2015 02:02 PM    CHOL/HDL Ratio 7.7 (H) 05/31/2009 10:30 AM     Lab Results   Component Value Date/Time    Glucose (POC) 98 03/12/2018 04:17 PM    Glucose (POC) 92 03/12/2018 11:45 AM    Glucose (POC) 143 (H) 03/12/2018 06:01 AM    Glucose (POC) 57 (L) 03/12/2018 05:39 AM    Glucose (POC) 79 01/19/2018 12:30 PM    Glucose (POC) 95 09/16/2015 12:08 PM    Glucose (POC) 116 (H) 09/03/2013 06:20 AM     Lab Results   Component Value Date/Time    Color YELLOW/STRAW 08/12/2016 09:06 PM    Appearance CLEAR 08/12/2016 09:06 PM    Specific gravity 1.017 08/12/2016 09:06 PM    Specific gravity 1.010 07/11/2016 12:44 PM    pH (UA) 7.0 08/12/2016 09:06 PM    Protein 300 (A) 08/12/2016 09:06 PM    Glucose NEGATIVE  08/12/2016 09:06 PM    Ketone TRACE (A) 08/12/2016 09:06 PM    Bilirubin NEGATIVE  07/11/2016 12:44 PM    Urobilinogen 1.0 08/12/2016 09:06 PM    Nitrites NEGATIVE  08/12/2016 09:06 PM    Leukocyte Esterase NEGATIVE  08/12/2016 09:06 PM    Epithelial cells MODERATE (A) 08/12/2016 09:06 PM    Bacteria 1+ (A) 08/12/2016 09:06 PM    WBC 0-4 08/12/2016 09:06 PM    RBC 0-5 08/12/2016 09:06 PM         Medications Reviewed:     Current Facility-Administered Medications   Medication Dose Route Frequency    glucose chewable tablet 16 g  4 Tab Oral PRN    dextrose (D50W) injection syrg 12.5-25 g  12.5-25 g IntraVENous PRN    glucagon (GLUCAGEN) injection 1 mg  1 mg IntraMUSCular PRN    [START ON 3/13/2018] epoetin pia (EPOGEN;PROCRIT) 14,000 Units  14,000 Units SubCUTAneous DIALYSIS TUE, THU & SAT    vancomycin random level Monday 3/12 at 6pm   Other ONCE    [START ON 3/13/2018] vancomycin (VANCOCIN) 500 mg in 0.9% sodium chloride (MBP/ADV) 100 mL  500 mg IntraVENous DIALYSIS TUE, THU & SAT    insulin lispro (HUMALOG) injection   SubCUTAneous AC&HS    pantoprazole (PROTONIX) tablet 40 mg  40 mg Oral ACB    allopurinol (ZYLOPRIM) tablet 100 mg  100 mg Oral DAILY AFTER BREAKFAST    cyanocobalamin (VITAMIN B12) tablet 2,500 mcg  2,500 mcg Oral DAILY    hydroxychloroquine (PLAQUENIL) tablet 200 mg  200 mg Oral BID    albuterol (PROVENTIL VENTOLIN) nebulizer solution 2.5 mg  2.5 mg Nebulization Q4H PRN    azaTHIOprine (IMURAN) tablet 50 mg  50 mg Oral DAILY AFTER BREAKFAST    apixaban (ELIQUIS) tablet 5 mg  5 mg Oral BID    gemfibrozil (LOPID) tablet 300 mg  300 mg Oral DAILY    oxyCODONE IR (Kwasi Edgar) tablet 2.5-5 mg  2.5-5 mg Oral Q6H PRN    amitriptyline (ELAVIL) tablet 25 mg  25 mg Oral QHS    sodium chloride (NS) flush 5-10 mL  5-10 mL IntraVENous Q8H    sodium chloride (NS) flush 5-10 mL  5-10 mL IntraVENous PRN    0.9% sodium chloride infusion  50 mL/hr IntraVENous CONTINUOUS    ondansetron (ZOFRAN) injection 4 mg  4 mg IntraVENous Q4H PRN    piperacillin-tazobactam (ZOSYN) 3.375 g in 0.9% sodium chloride (MBP/ADV) 100 mL  3.375 g IntraVENous Q12H    fluconazole (DIFLUCAN) 200mg/100 mL IVPB (premix)  200 mg IntraVENous Q24H    methylPREDNISolone (PF) (SOLU-MEDROL) injection 40 mg  40 mg IntraVENous Q6H    HYDROmorphone (DILAUDID) injection 0.5 mg  0.5 mg IntraVENous Q4H PRN    arformoterol (BROVANA) neb solution 15 mcg  15 mcg Nebulization BID RT    And    budesonide (PULMICORT) 500 mcg/2 ml nebulizer suspension  500 mcg Nebulization BID RT    Vancomycin - pharmacy to dose   Other Rx Dosing/Monitoring     ______________________________________________________________________  EXPECTED LENGTH OF STAY: 4d 21h  ACTUAL LENGTH OF STAY:          1                 Sana Justin MD

## 2018-03-12 NOTE — CONSULTS
Assessment:  ESRD: on HD TTS  E. Coli Bacteremia (Hard copy placed in chart): likely intra-abd source/peritonitis. 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 Renal service will follow patient with you. Please contact me with any questions or concerns. Initial Consult note         Patient name: Avi Meneses  MR no: [id number]  Date of admission: 3/11/2018  Date of consultation: 3/12/2018  Requested by: Dr. Justin Hayes  Reason for consult: ESRD    Patient seen and examined. History obtained from patient and chart review. Relevant labs, data and notes reviewed. HPI: Avi Meneses is a 29 y.o. 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 Patient recently developed fevers while on HD last week. Bld Cx from 3/6/18 grew E.Coli-> started on IV Gent 40mg qHD by our NP. Permacath was removed last week-> now using R AVG. +Abd pain. No n/v/d. Poor intake. Chronic protein malnutrition. CT Abd c/w loculated peritoneal fluid. PMH:  Past Medical History:   Diagnosis Date    Anemia     secondary to lupus    Asthma     no inhaler use in past 2 to 3 years    Carditis     Chronic kidney disease     ESRD    Chronic pain     DDD (degenerative disc disease), lumbar     ESRD (end stage renal disease) (HCC)     GERD (gastroesophageal reflux disease)     Heart failure ([facility] [address]     Hemodialysis patient ([facility] [address] 12/21/2017    [address]  Tuesday,  Thursday,  and Saturday.      Hypercholesterolemia     Hypertension     Intractable nausea and vomiting 10/21/2015  Long term (current) use of anticoagulants     Lupus     Lupus (systemic lupus erythematosus) (HCC)     Malignant hypertension with chronic kidney disease stage V ([facility] [address]     Peritoneal dialysis status ([facility] [address] 10/2015    x 2 years Stopped 2017 due to infection and removed.  Poor historian 2018    With medications    Thromboembolus ([facility] [address] 2013    lungs    Transfusion history     Last Transfusion 2017  at [facility]     [address] [place]:  Past Surgical History:   Procedure Laterality Date    HX  SECTION  11/2006    x1    HX OTHER SURGICAL  9/16/15    INSERTION PD CATH;  Removed 2017    HX VASCULAR ACCESS Right 2017    Double-Lumen henry catheter upper chest       Social history:   Social History   Substance Use Topics    Smoking status: Never Smoker    Smokeless tobacco: Never Used    Alcohol use No       Family history:  Not contributory    Allergies   Allergen Reactions    Compazine [Prochlorperazine Edisylate] Nausea and Vomiting and Palpitations       Current Facility-Administered Medications   Medication Dose Route Frequency Last Dose    insulin lispro (HUMALOG) injection   SubCUTAneous Q6H Stopped at 18 0734    glucose chewable tablet 16 g  4 Tab Oral PRN      dextrose (D50W) injection syrg 12.5-25 g  12.5-25 g IntraVENous PRN      glucagon (GLUCAGEN) injection 1 mg  1 mg IntraMUSCular PRN      pantoprazole (PROTONIX) tablet 40 mg  40 mg Oral ACB      allopurinol (ZYLOPRIM) tablet 100 mg  100 mg Oral DAILY AFTER BREAKFAST      cyanocobalamin (VITAMIN B12) tablet 2,500 mcg  2,500 mcg Oral DAILY      hydroxychloroquine (PLAQUENIL) tablet 200 mg  200 mg Oral  mg at 18    albuterol (PROVENTIL VENTOLIN) nebulizer solution 2.5 mg  2.5 mg Nebulization Q4H PRN      azaTHIOprine (IMURAN) tablet 50 mg  50 mg Oral DAILY AFTER BREAKFAST      apixaban (ELIQUIS) tablet 5 mg  5 mg Oral BID 5 mg at 18    gemfibrozil (LOPID) tablet 300 mg  300 mg Oral DAILY  oxyCODONE IR (ROXICODONE) tablet 2.5-5 mg  2.5-5 mg Oral Q6H PRN      amitriptyline (ELAVIL) tablet 25 mg  25 mg Oral QHS 25 mg at 03/11/18 2109    sodium chloride (NS) flush 5-10 mL  5-10 mL IntraVENous Q8H 10 mL at 03/12/18 0547    sodium chloride (NS) flush 5-10 mL  5-10 mL IntraVENous PRN      0.9% sodium chloride infusion  50 mL/hr IntraVENous CONTINUOUS 50 mL/hr at 03/11/18 2033    ondansetron (ZOFRAN) injection 4 mg  4 mg IntraVENous Q4H PRN      piperacillin-tazobactam (ZOSYN) 3.375 g in 0.9% sodium chloride (MBP/ADV) 100 mL  3.375 g IntraVENous Q12H Stopped at 03/12/18 0826    fluconazole (DIFLUCAN) 200mg/100 mL IVPB (premix)  200 mg IntraVENous Q24H Stopped at 03/11/18 2245    methylPREDNISolone (PF) (SOLU-MEDROL) injection 40 mg  40 mg IntraVENous Q6H 40 mg at 03/12/18 0752    HYDROmorphone (DILAUDID) injection 0.5 mg  0.5 mg IntraVENous Q4H PRN 0.5 mg at 03/12/18 0842    arformoterol (BROVANA) neb solution 15 mcg  15 mcg Nebulization BID RT 15 mcg at 03/11/18 2159    And    budesonide (PULMICORT) 500 mcg/2 ml nebulizer suspension  500 mcg Nebulization BID  mcg at 03/11/18 2159    Vancomycin - pharmacy to dose   Other Rx Dosing/Monitoring      vancomycin (VANCOCIN) 500 mg in 0.9% sodium chloride (MBP/ADV) 100 mL  500 mg IntraVENous DIALYSIS PRN         ROS (besides HPI):    General: + Fever. No weight changes  ENT: No hearing loss or visual changes  Cardiovascular: No Chest pain  Pulmonary: No SOB  GI: + abdominal pain. No Nausea/Vomiting/Diarrhea. No blood in stool  : No blood in urine. No foamy or cloudy urine  Musculoskeletal: No joint swelling or redness.  No morning stiffness  Endocrine: no cold or heat intolerance  Psych: denies anxiety or depression  Neuro: No light headedness or dizziness    Objective   Visit Vitals    /65 (BP 1 Location: Left arm, BP Patient Position: At rest)    Pulse 93    Temp 98 °F (36.7 °C)    Resp 16    Ht 5' 5\" (1.651 m)    Wt 65.3 kg (144 lb)    SpO2 94%    BMI 23.96 kg/m2       Physical Exam:    Gen: NAD    HEENT: AT/NC, EOMI, dry mucous membrane, no scleral icterus    Neck: no JVD, no cervical lymphadenopathy, no carotid bruit    Lungs/Chest wall: Breath sounds normal. Symmetrical chest wall expansion. No accessory muscle use. Clear to auscultation    Cardiovascular: Normal S1/S2, normal rate, regular rhythm. Abdomen:Mildly distended, Tender to palpation (diffusely)    Ext: no clubbing or cyanosis. R UE AVG. No edema    Skin: warm and dry. No rashes    : no CVA tenderness    CNS: alert awake. Answers appropriately.      Labs/Data:    Lab Results   Component Value Date/Time    Sodium 138 03/12/2018 03:00 AM    Potassium 4.0 03/12/2018 03:00 AM    Chloride 98 03/12/2018 03:00 AM    CO2 31 03/12/2018 03:00 AM    Anion gap 9 03/12/2018 03:00 AM    Glucose 59 (L) 03/12/2018 03:00 AM    BUN 17 03/12/2018 03:00 AM    Creatinine 4.03 (H) 03/12/2018 03:00 AM    BUN/Creatinine ratio 4 (L) 03/12/2018 03:00 AM    GFR est AA 16 (L) 03/12/2018 03:00 AM    GFR est non-AA 13 (L) 03/12/2018 03:00 AM    Calcium 8.5 03/12/2018 03:00 AM       Lab Results   Component Value Date/Time    WBC 3.6 03/12/2018 03:00 AM    Hemoglobin (POC) 7.8 (L) 01/19/2018 12:30 PM    HGB 8.8 (L) 03/12/2018 03:00 AM    Hematocrit (POC) 23 (L) 01/19/2018 12:30 PM    HCT 29.2 (L) 03/12/2018 03:00 AM    PLATELET 850 (L) 58/19/8304 03:00 AM    MCV 85.4 03/12/2018 03:00 AM           No components found for: SPEP, UPEP  Lab Results   Component Value Date/Time    Protein,urine 24 hr 2071.00 (H) 05/30/2009 10:00 PM    Total protein 7.30 07/27/2009 03:30 AM     Lab Results   Component Value Date/Time    Microalbumin/Creat ratio (mg/g creat) 1015 09/22/2009 06:30 PM    Microalb/Creat ratio (ug/mg creat.) 2443.5 (H) 03/07/2014 02:16 PM    Microalbumin,urine random 250.86 09/22/2009 06:30 PM       No intake or output data in the 24 hours ending 03/12/18 0857    Wt Readings from Last 3 Encounters:   03/11/18 65.3 kg (144 lb)   03/05/18 68 kg (150 lb)   01/29/18 69.4 kg (153 lb)       Signed by:  Jose Stokes MD  Nephrology and Hypertension  [facility]

## 2018-03-12 NOTE — PROGRESS NOTES
Day #1 of Zosyn  Indication:  intra-abdominal infection  Current regimen:  3.375 G IV q6h  Abx regimen: Vanc + Zosyn  Recent Labs      18   1342   WBC  2.5*   CREA  3.68*   BUN  13     Est CrCl: ESRD on HD   Temp (24hrs), Av.3 °F (36.8 °C), Min:98.3 °F (36.8 °C), Max:98.3 °F (36.8 °C)    Cultures:   3/11 blood - pending  3/11 ascitic fluid - pending      Plan: Change to 3.375G IV q12h for HD dosing. per [address] [place] [address] P&T Committee Protocol with respect to renal function. Pharmacy will continue to monitor patient daily and will make dosage adjustments based upon changing renal function. Fluconazole 400 mg IV q24 hr also ordered. Adjust to 200 mg q24hr for HD dosing.

## 2018-03-13 LAB
ALBUMIN SERPL-MCNC: 1.3 G/DL (ref 3.5–5)
ALBUMIN/GLOB SERPL: 0.2 {RATIO} (ref 1.1–2.2)
ALP SERPL-CCNC: 73 U/L (ref 45–117)
ALT SERPL-CCNC: <6 U/L (ref 12–78)
ANION GAP SERPL CALC-SCNC: 10 MMOL/L (ref 5–15)
AST SERPL-CCNC: 12 U/L (ref 15–37)
BACTERIA SPEC CULT: ABNORMAL
BILIRUB SERPL-MCNC: 0.8 MG/DL (ref 0.2–1)
BUN SERPL-MCNC: 31 MG/DL (ref 6–20)
BUN/CREAT SERPL: 6 (ref 12–20)
CALCIUM SERPL-MCNC: 8.6 MG/DL (ref 8.5–10.1)
CHLORIDE SERPL-SCNC: 98 MMOL/L (ref 97–108)
CO2 SERPL-SCNC: 28 MMOL/L (ref 21–32)
CREAT SERPL-MCNC: 4.92 MG/DL (ref 0.55–1.02)
ERYTHROCYTE [DISTWIDTH] IN BLOOD BY AUTOMATED COUNT: 18.6 % (ref 11.5–14.5)
GLOBULIN SER CALC-MCNC: 5.4 G/DL (ref 2–4)
GLUCOSE BLD STRIP.AUTO-MCNC: 111 MG/DL (ref 65–100)
GLUCOSE BLD STRIP.AUTO-MCNC: 112 MG/DL (ref 65–100)
GLUCOSE BLD STRIP.AUTO-MCNC: 122 MG/DL (ref 65–100)
GLUCOSE BLD STRIP.AUTO-MCNC: 96 MG/DL (ref 65–100)
GLUCOSE SERPL-MCNC: 123 MG/DL (ref 65–100)
GRAM STN SPEC: ABNORMAL
HCT VFR BLD AUTO: 27.8 % (ref 35–47)
HGB BLD-MCNC: 8.4 G/DL (ref 11.5–16)
MAGNESIUM SERPL-MCNC: 1.7 MG/DL (ref 1.6–2.4)
MCH RBC QN AUTO: 24.9 PG (ref 26–34)
MCHC RBC AUTO-ENTMCNC: 30.2 G/DL (ref 30–36.5)
MCV RBC AUTO: 82.5 FL (ref 80–99)
NRBC # BLD: 0 K/UL (ref 0–0.01)
NRBC BLD-RTO: 0 PER 100 WBC
PHOSPHATE SERPL-MCNC: 3 MG/DL (ref 2.6–4.7)
PLATELET # BLD AUTO: 112 K/UL (ref 150–400)
POTASSIUM SERPL-SCNC: 4.3 MMOL/L (ref 3.5–5.1)
PROT SERPL-MCNC: 6.7 G/DL (ref 6.4–8.2)
RBC # BLD AUTO: 3.37 M/UL (ref 3.8–5.2)
SERVICE CMNT-IMP: ABNORMAL
SERVICE CMNT-IMP: NORMAL
SODIUM SERPL-SCNC: 136 MMOL/L (ref 136–145)
WBC # BLD AUTO: 6.5 K/UL (ref 3.6–11)

## 2018-03-13 PROCEDURE — 94640 AIRWAY INHALATION TREATMENT: CPT

## 2018-03-13 PROCEDURE — 80053 COMPREHEN METABOLIC PANEL: CPT | Performed by: INTERNAL MEDICINE

## 2018-03-13 PROCEDURE — 5A1D70Z PERFORMANCE OF URINARY FILTRATION, INTERMITTENT, LESS THAN 6 HOURS PER DAY: ICD-10-PCS | Performed by: HOSPITALIST

## 2018-03-13 PROCEDURE — 87040 BLOOD CULTURE FOR BACTERIA: CPT | Performed by: INTERNAL MEDICINE

## 2018-03-13 PROCEDURE — 82962 GLUCOSE BLOOD TEST: CPT

## 2018-03-13 PROCEDURE — 85027 COMPLETE CBC AUTOMATED: CPT | Performed by: INTERNAL MEDICINE

## 2018-03-13 PROCEDURE — 83735 ASSAY OF MAGNESIUM: CPT | Performed by: INTERNAL MEDICINE

## 2018-03-13 PROCEDURE — 74011000250 HC RX REV CODE- 250: Performed by: HOSPITALIST

## 2018-03-13 PROCEDURE — 65660000000 HC RM CCU STEPDOWN

## 2018-03-13 PROCEDURE — 74011250637 HC RX REV CODE- 250/637: Performed by: HOSPITALIST

## 2018-03-13 PROCEDURE — 74011000258 HC RX REV CODE- 258: Performed by: HOSPITALIST

## 2018-03-13 PROCEDURE — 94664 DEMO&/EVAL PT USE INHALER: CPT

## 2018-03-13 PROCEDURE — 74011250636 HC RX REV CODE- 250/636: Performed by: HOSPITALIST

## 2018-03-13 PROCEDURE — 36415 COLL VENOUS BLD VENIPUNCTURE: CPT | Performed by: INTERNAL MEDICINE

## 2018-03-13 PROCEDURE — 84100 ASSAY OF PHOSPHORUS: CPT | Performed by: INTERNAL MEDICINE

## 2018-03-13 PROCEDURE — 74011636637 HC RX REV CODE- 636/637: Performed by: HOSPITALIST

## 2018-03-13 RX ORDER — HYDROMORPHONE HYDROCHLORIDE 2 MG/ML
0.5 INJECTION, SOLUTION INTRAMUSCULAR; INTRAVENOUS; SUBCUTANEOUS
Status: DISCONTINUED | OUTPATIENT
Start: 2018-03-13 | End: 2018-04-06

## 2018-03-13 RX ADMIN — GEMFIBROZIL 300 MG: 600 TABLET ORAL at 09:16

## 2018-03-13 RX ADMIN — PREDNISONE 5 MG: 5 TABLET ORAL at 09:16

## 2018-03-13 RX ADMIN — ARFORMOTEROL TARTRATE 15 MCG: 15 SOLUTION RESPIRATORY (INHALATION) at 22:22

## 2018-03-13 RX ADMIN — ARFORMOTEROL TARTRATE 15 MCG: 15 SOLUTION RESPIRATORY (INHALATION) at 15:26

## 2018-03-13 RX ADMIN — BUDESONIDE 500 MCG: 0.5 INHALANT RESPIRATORY (INHALATION) at 15:26

## 2018-03-13 RX ADMIN — Medication 10 ML: at 20:53

## 2018-03-13 RX ADMIN — HYDROXYCHLOROQUINE SULFATE 200 MG: 200 TABLET, FILM COATED ORAL at 19:28

## 2018-03-13 RX ADMIN — PIPERACILLIN SODIUM,TAZOBACTAM SODIUM 3.38 G: 3; .375 INJECTION, POWDER, FOR SOLUTION INTRAVENOUS at 15:43

## 2018-03-13 RX ADMIN — HYDROMORPHONE HYDROCHLORIDE 0.5 MG: 2 INJECTION INTRAMUSCULAR; INTRAVENOUS; SUBCUTANEOUS at 20:53

## 2018-03-13 RX ADMIN — Medication 10 ML: at 14:00

## 2018-03-13 RX ADMIN — FLUCONAZOLE 200 MG: 2 INJECTION, SOLUTION INTRAVENOUS at 20:28

## 2018-03-13 RX ADMIN — AZATHIOPRINE 50 MG: 50 TABLET ORAL at 09:20

## 2018-03-13 RX ADMIN — OXYCODONE HYDROCHLORIDE 5 MG: 5 TABLET ORAL at 10:30

## 2018-03-13 RX ADMIN — Medication 2500 MCG: at 17:26

## 2018-03-13 RX ADMIN — APIXABAN 5 MG: 5 TABLET, FILM COATED ORAL at 17:26

## 2018-03-13 RX ADMIN — HYDROMORPHONE HYDROCHLORIDE 0.5 MG: 2 INJECTION INTRAMUSCULAR; INTRAVENOUS; SUBCUTANEOUS at 15:07

## 2018-03-13 RX ADMIN — HYDROXYCHLOROQUINE SULFATE 200 MG: 200 TABLET, FILM COATED ORAL at 09:16

## 2018-03-13 RX ADMIN — ALLOPURINOL 100 MG: 100 TABLET ORAL at 09:16

## 2018-03-13 RX ADMIN — HYDROMORPHONE HYDROCHLORIDE 0.5 MG: 2 INJECTION INTRAMUSCULAR; INTRAVENOUS; SUBCUTANEOUS at 04:05

## 2018-03-13 RX ADMIN — PANTOPRAZOLE SODIUM 40 MG: 40 TABLET, DELAYED RELEASE ORAL at 06:56

## 2018-03-13 RX ADMIN — PIPERACILLIN SODIUM,TAZOBACTAM SODIUM 3.38 G: 3; .375 INJECTION, POWDER, FOR SOLUTION INTRAVENOUS at 04:05

## 2018-03-13 RX ADMIN — BUDESONIDE 500 MCG: 0.5 INHALANT RESPIRATORY (INHALATION) at 22:22

## 2018-03-13 RX ADMIN — Medication 10 ML: at 06:55

## 2018-03-13 RX ADMIN — APIXABAN 5 MG: 5 TABLET, FILM COATED ORAL at 09:16

## 2018-03-13 NOTE — PROGRESS NOTES
Hospitalist Progress Note  David Wilks MD  Answering service: [phone] from in house phone  Cell: [phone]      Date of Service:  3/13/2018  NAME:  Angella Jimenez  :  1985  MRN:  [id number]      Admission Summary: This is a 21-year-old female with multiple medical problems including SLE, end-stage renal disease (on hemodialysis, TTS), pulmonary embolism, on Eliquis (), hyperlipidemia, hypertension, recent Candida peritonitis, chronic bilateral pain. She comes over here because of abdominal pain since last 4 or 5 days.     The patient claims that last Wednesday, that is about 4 days ago, the patient started having fever and at that time, she noted that her temperature was 100.4 degrees Fahrenheit. At the same time, she also started having abdominal pain. She describes her pain as throughout the whole abdomen, sharp 7/10 to 9/10 in intensity off and on, nonradiating, slightly gets better with pain medication, but there was no triggering factor whatsoever. In any case, the next day the patient went for dialysis. She claims that over there, they did the blood cultures and put her on antibiotics. She was told that in the blood culture, there was a growth of bacteria, but she does not remember the name of the bacteria. She also does not remember the name of the antibiotic as well. Please also note that recently the patient was switched to hemodialysis and subsequently received a graft. Last Wednesday, her dialysis catheter was taken out. When the patient's abdominal pain continued then today she decided to come into the hospital.  The patient claims that she did not have any other symptoms including chest pain, shortness of breath, cough, nausea, vomiting, headache, dizziness.   No changes in bowel movements.       Interval history / Subjective:     F/u Abdominal pain   Abdominal pain improved but returns after around 3 hours  Assessment & Plan:     Acute peritonitis  -recent candida peritonitis  -CT abd 3/11 Large amount of free intraperitoneal fluid appears somewhat loculated  -Appreciate discussion with ID  -Follow cultures  -On Vanc/Zosyn/Fluconazole  -May need surgery, will consult surgery  -Will increase the frequency of dilaudid    E coli bacteremia/Sepsis  -Spoke to Dr Ana Rowland who told me that the patient was found to have E coli bacteremia 4 days ago    Hypotension  -now resolved  -Was on stress steroids  -Now on regular oral steroids    Thrombocytopenia   -Monitor closely  -Will switch Zosyn to cefepime if gets worse  -Will stop Eliquis if gets worse    Bilateral pedal edema  -Dopplers negative for DVT    SLE  -continue home meds    History of PE  -on Eliquis since 2012  -not sure if she should be on eliquis for that long  -Appreciate discussion with PMD 3/13. The patient was seen by hematology in 2013 but seems was lost to follow up. I think at that time the plan was to stop anticoagulation if her SLE is stable. Spoke to Dr Suri Bennett, he feels that the patient can come off Eliquis. His records do not suggest any recurrence of DVT/PE. Will talk to Dr Juvenal Ventura, the patient's rheumatologist. If he agrees, will stop the patient's Eliquis    ESRD  -on HD TTS    Anemia  -of chronic disease    Severe hepatic steatosis  -monitor    HTN  -stable    Severe protein calorie malnutrition  -Consult nutrition  -nutritional supp    Renal diet    Code status: FULL CODE  DVT prophylaxis: Eliquis  PTA: home    Plan: Follow ID, nephrology, surgery    Care Plan discussed with: Patient/Family  Disposition: Home w/Family     Hospital Problems  Date Reviewed: 3/11/2018          Codes Class Noted POA    * (Principal)Peritonitis ([facility] [address] ICD-10-CM: K65.9  ICD-9-CM: 567.9  3/11/2018 Yes                Review of Systems:   A comprehensive review of systems was negative except for that written in the HPI.        Vital Signs:    Last 24hrs VS reviewed since prior progress note. Most recent are:  Visit Vitals    BP (!) 138/104 (BP 1 Location: Left arm, BP Patient Position: Sitting)    Pulse 78    Temp 97.4 °F (36.3 °C)    Resp 16    Ht 5' 5\" (1.651 m)    Wt 66.4 kg (146 lb 4.8 oz)    SpO2 99%    BMI 24.35 kg/m2         Intake/Output Summary (Last 24 hours) at 03/13/18 1137  Last data filed at 03/13/18 0800   Gross per 24 hour   Intake              340 ml   Output                0 ml   Net              340 ml        Physical Examination:             Constitutional:  No acute distress, cooperative, pleasant    ENT:  Oral mucous moist, oropharynx benign. Neck supple,    Resp:  CTA bilaterally. No wheezing/rhonchi/rales. No accessory muscle use   CV:  Regular rhythm, normal rate, no murmurs, gallops, rubs    GI:  Soft, non distended, non tender. normoactive bowel sounds, no hepatosplenomegaly     Musculoskeletal:  No edema, warm, 2+ pulses throughout    Neurologic:  Moves all extremities. AAOx3, CN II-XII reviewed     Skin:  Good turgor, no rashes or ulcers       Data Review:    Review and/or order of clinical lab test      Labs:     Recent Labs      03/13/18 0425 03/12/18   0300   WBC  6.5  3.6   HGB  8.4*  8.8*   HCT  27.8*  29.2*   PLT  112*  105*     Recent Labs      03/13/18 0425 03/12/18   0300  03/11/18   1342   NA  136  138  138   K  4.3  4.0  3.7   CL  98  98  97   CO2  28  31  34*   BUN  31*  17  13   CREA  4.92*  4.03*  3.68*   GLU  123*  59*  71   CA  8.6  8.5  8.5   MG  1.7   --    --    PHOS  3.0   --    --      Recent Labs      03/13/18 0425 03/12/18   0300  03/11/18   1342   SGOT  12*  19  24   ALT  <6*  6*  6*   AP  73  71  73   TBILI  0.8  1.3*  1.3*   TP  6.7  6.9  7.4   ALB  1.3*  1.3*  1.5*   GLOB  5.4*  5.6*  5.9*   LPSE   --    --   27*     No results for input(s): INR, PTP, APTT in the last 72 hours. No lab exists for component: INREXT, INREXT   No results for input(s): FE, TIBC, PSAT, FERR in the last 72 hours. Lab Results   Component Value Date/Time    Folate 3.9 11/06/2017 11:49 AM      No results for input(s): PH, PCO2, PO2 in the last 72 hours. No results for input(s): CPK, CKNDX, TROIQ in the last 72 hours.     No lab exists for component: CPKMB  Lab Results   Component Value Date/Time    Cholesterol, total 117 09/25/2015 02:02 PM    HDL Cholesterol 31 (L) 09/25/2015 02:02 PM    LDL, calculated 57 09/25/2015 02:02 PM    Triglyceride 146 09/25/2015 02:02 PM    CHOL/HDL Ratio 7.7 (H) 05/31/2009 10:30 AM     Lab Results   Component Value Date/Time    Glucose (POC) 122 (H) 03/13/2018 06:17 AM    Glucose (POC) 128 (H) 03/12/2018 09:26 PM    Glucose (POC) 98 03/12/2018 04:17 PM    Glucose (POC) 92 03/12/2018 11:45 AM    Glucose (POC) 143 (H) 03/12/2018 06:01 AM     Lab Results   Component Value Date/Time    Color YELLOW/STRAW 08/12/2016 09:06 PM    Appearance CLEAR 08/12/2016 09:06 PM    Specific gravity 1.017 08/12/2016 09:06 PM    Specific gravity 1.010 07/11/2016 12:44 PM    pH (UA) 7.0 08/12/2016 09:06 PM    Protein 300 (A) 08/12/2016 09:06 PM    Glucose NEGATIVE  08/12/2016 09:06 PM    Ketone TRACE (A) 08/12/2016 09:06 PM    Bilirubin NEGATIVE  07/11/2016 12:44 PM    Urobilinogen 1.0 08/12/2016 09:06 PM    Nitrites NEGATIVE  08/12/2016 09:06 PM    Leukocyte Esterase NEGATIVE  08/12/2016 09:06 PM    Epithelial cells MODERATE (A) 08/12/2016 09:06 PM    Bacteria 1+ (A) 08/12/2016 09:06 PM    WBC 0-4 08/12/2016 09:06 PM    RBC 0-5 08/12/2016 09:06 PM         Medications Reviewed:     Current Facility-Administered Medications   Medication Dose Route Frequency    glucose chewable tablet 16 g  4 Tab Oral PRN    dextrose (D50W) injection syrg 12.5-25 g  12.5-25 g IntraVENous PRN    glucagon (GLUCAGEN) injection 1 mg  1 mg IntraMUSCular PRN    epoetin pia (EPOGEN;PROCRIT) 14,000 Units  14,000 Units SubCUTAneous DIALYSIS TUE, THU & SAT    vancomycin (VANCOCIN) 500 mg in 0.9% sodium chloride (MBP/ADV) 100 mL  500 mg IntraVENous DIALYSIS TUE, THU & SAT    insulin lispro (HUMALOG) injection   SubCUTAneous AC&HS    predniSONE (DELTASONE) tablet 5 mg  5 mg Oral DAILY    pantoprazole (PROTONIX) tablet 40 mg  40 mg Oral ACB    allopurinol (ZYLOPRIM) tablet 100 mg  100 mg Oral DAILY AFTER BREAKFAST    cyanocobalamin (VITAMIN B12) tablet 2,500 mcg  2,500 mcg Oral DAILY    hydroxychloroquine (PLAQUENIL) tablet 200 mg  200 mg Oral BID    albuterol (PROVENTIL VENTOLIN) nebulizer solution 2.5 mg  2.5 mg Nebulization Q4H PRN    azaTHIOprine (IMURAN) tablet 50 mg  50 mg Oral DAILY AFTER BREAKFAST    apixaban (ELIQUIS) tablet 5 mg  5 mg Oral BID    gemfibrozil (LOPID) tablet 300 mg  300 mg Oral DAILY    oxyCODONE IR (ROXICODONE) tablet 2.5-5 mg  2.5-5 mg Oral Q6H PRN    amitriptyline (ELAVIL) tablet 25 mg  25 mg Oral QHS    sodium chloride (NS) flush 5-10 mL  5-10 mL IntraVENous Q8H    sodium chloride (NS) flush 5-10 mL  5-10 mL IntraVENous PRN    ondansetron (ZOFRAN) injection 4 mg  4 mg IntraVENous Q4H PRN    piperacillin-tazobactam (ZOSYN) 3.375 g in 0.9% sodium chloride (MBP/ADV) 100 mL  3.375 g IntraVENous Q12H    fluconazole (DIFLUCAN) 200mg/100 mL IVPB (premix)  200 mg IntraVENous Q24H    HYDROmorphone (DILAUDID) injection 0.5 mg  0.5 mg IntraVENous Q4H PRN    arformoterol (BROVANA) neb solution 15 mcg  15 mcg Nebulization BID RT    And    budesonide (PULMICORT) 500 mcg/2 ml nebulizer suspension  500 mcg Nebulization BID RT    Vancomycin - pharmacy to dose   Other Rx Dosing/Monitoring     ______________________________________________________________________  EXPECTED LENGTH OF STAY: 4d 21h  ACTUAL LENGTH OF STAY:          2                 Justin Hayes MD

## 2018-03-13 NOTE — PROGRESS NOTES
Pharmacist Note - Vancomycin Dosing  Therapy day 2  Indication: Peritonitis  Current regimen: Vancomycin 500mg IV T/Th/Sat post HD    A Random Level resulted at 26.6 mcg/mL which was obtained 24 hrs post loading dose. Goal trough = 15 - 20 mcg/mL for therapeutic goal of 10 - 15 mcg/mL (assuming ~35% removal by dialysis)     Plan: No supplemental dose needed. Will continue with 500mg IV T/Th/Sat post HD. Pharmacy will continue to monitor this patient daily for changes in clinical status and renal function.

## 2018-03-13 NOTE — PROGRESS NOTES
General Surgery In-Patient Consultation    Admit Date: 3/11/2018  Reason for Consultation: loculated peritonitis  HPI:  Michael Hummel is a 29 y.o. female with a PMH significant for ESRD, SLE, HTN, malignant HTN, and on Eliquis for hx of PE, whom we are asked to see in consultation by Dr. Nga Coley for the above complaint. The pt was on peritoneal dialysis until she developed fungal peritonitis and transitioned to HD in December 2017. The PD catheter was removed in December by Vascular and an AV graft was placed. She presented to the ED five days ago with severe lower ABD pain. The pain was described as sharp and rated 10/10. The ABD pain was slightly relieved when she took her prescribed oxycodone for chronic pain. The ABD pain was accompanied by fever, although she never took her temperature at home but states \"the staff at the dialysis center took it and it was 100.4\". She also reports \"they also mel some other blood in lots of tubes for cultures and started me on antibiotics. \" No aggravating factors reported. Pt denies nausea, vomiting, changes in bowel habits, appetite, or chills. 3/11/2018  Ascitic fluid sample was obtained for gram stain and C&S in the ED with heavy e-coli growth  Paired Blood cultures -No growth 2 days  CT ABD PELV WO CONT   IMPRESSION:  Large amount of free intraperitoneal fluid appears somewhat loculated. The patient had a peritoneal dialysis catheter removed. There is subcutaneous edema. Loculation and enhancement of the peritoneal margin suggests possible  Peritonitis. Extensive subcutaneous edema. Atrophic kidneys.  Severe hepatic steatosis.     Patient Active Problem List    Diagnosis Date Noted    Peritonitis ([address] 03/11/2018    History of pulmonary embolism 12/08/2017    Hypomagnesemia 10/30/2017    Hypocalcemia 10/30/2017    Hypokalemia 10/27/2017    Hypertension 10/14/2017    Chronic bilateral low back pain without sciatica 05/26/2017    Anemia of renal disease 2017    Dependence on peritoneal dialysis ([address] 2017    ACP (advance care planning) 2017    ESRD on peritoneal dialysis ([address] 10/07/2016    Malignant hypertension 2016    Encounter for monitoring opioid maintenance therapy 2015    Lupus nephritis ([address] 03/10/2012    Lupus 2012     Past Medical History:   Diagnosis Date    Anemia     secondary to lupus    Asthma     no inhaler use in past 2 to 3 years    Carditis     Chronic kidney disease     ESRD    Chronic pain     DDD (degenerative disc disease), lumbar     ESRD (end stage renal disease) ([address]     GERD (gastroesophageal reflux disease)     Heart failure ([address]     Hemodialysis patient ([address] 2017    [address]  Tuesday,  Thursday,  and Saturday. 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  Poor historian 2018    With medications    Thromboembolus ([facility] [address] 2013    lungs    Transfusion history     Last Transfusion 2017  at [facility]      Past Surgical History:   Procedure Laterality Date    HX  SECTION  11/2006    x1    HX OTHER SURGICAL  9/16/15    INSERTION PD CATH; Removed 2017    HX VASCULAR ACCESS Right 2017    Double-Lumen henry catheter upper chest      Social History   Substance Use Topics    Smoking status: Never Smoker    Smokeless tobacco: Never Used    Alcohol use No      Family History   Problem Relation Age of Onset    Diabetes Father     Hypertension Father     Cancer Other      aunt with breast cancer    Diabetes Mother       Prior to Admission medications    Medication Sig Start Date End Date Taking?  Authorizing Provider oxyCODONE IR (ROXICODONE) 5 mg immediate release tablet Take 1 tab in AM and HALF to 1 tab in PM if needed on days where back pain is severe  3/5/18  Yes Susan Josue NP   amitriptyline (ELAVIL) 25 mg tablet Take 1 Tab by mouth nightly. PRN 3/5/18  Yes Susan Josue NP   predniSONE (DELTASONE) 5 mg tablet Take 5 mg by mouth daily. Yes Historical Provider   fluticasone-vilanterol (BREO ELLIPTA) 200-25 mcg/dose inhaler Take 1 Puff by inhalation daily. Rinse mouth out after use 12/8/17  Yes Alix Dasilva NP   gemfibrozil (LOPID) 600 mg tablet Take 300 mg by mouth daily. 11/3/17  Yes Historical Provider   carvedilol (COREG) 6.25 mg tablet Take 12.5 mg by mouth two (2) times daily (with meals). Yes Historical Provider   apixaban (ELIQUIS) 5 mg tablet Take 5 mg by mouth every twelve (12) hours. Yes Historical Provider   azaTHIOprine (IMURAN) 50 mg tablet Take 50 mg by mouth daily (after breakfast). 11/3/17  Yes Historical Provider   albuterol (PROVENTIL HFA, VENTOLIN HFA, PROAIR HFA) 90 mcg/actuation inhaler Take 1-2 Puffs by inhalation every four (4) hours as needed for Wheezing or Shortness of Breath. 10/30/17  Yes William Crespo NP   hydroxychloroquine (PLAQUENIL) 200 mg tablet Take 200 mg by mouth two (2) times a day. Yes Darnell Franks MD   allopurinol (ZYLOPRIM) 100 mg tablet Take 100 mg by mouth daily (after breakfast). Needs to TAKE on a full stomach; will cause her to be nauseated. 10/15/15  Yes Historical Provider   cyanocobalamin (VITAMIN B-12) 2,500 mcg sublingual tablet Take 1 Tab by mouth daily. 10/27/15  Yes Jermain Ivy MD   pantoprazole (PROTONIX) 40 mg tablet Take 1 Tab by mouth Daily (before breakfast). 10/23/15  Yes Hood Wyman MD   senna (SENNA) 8.6 mg tablet Take 1 Tab by mouth daily as needed.    Yes Historical Provider     Current Facility-Administered Medications   Medication Dose Route Frequency    glucose chewable tablet 16 g  4 Tab Oral PRN    dextrose (D50W) injection syrg 12.5-25 g  12.5-25 g IntraVENous PRN    glucagon (GLUCAGEN) injection 1 mg  1 mg IntraMUSCular PRN    epoetin pia (EPOGEN;PROCRIT) 14,000 Units  14,000 Units SubCUTAneous DIALYSIS TUE, THU & SAT    vancomycin (VANCOCIN) 500 mg in 0.9% sodium chloride (MBP/ADV) 100 mL  500 mg IntraVENous DIALYSIS TUE, THU & SAT    insulin lispro (HUMALOG) injection   SubCUTAneous AC&HS    predniSONE (DELTASONE) tablet 5 mg  5 mg Oral DAILY    pantoprazole (PROTONIX) tablet 40 mg  40 mg Oral ACB    allopurinol (ZYLOPRIM) tablet 100 mg  100 mg Oral DAILY AFTER BREAKFAST    cyanocobalamin (VITAMIN B12) tablet 2,500 mcg  2,500 mcg Oral DAILY    hydroxychloroquine (PLAQUENIL) tablet 200 mg  200 mg Oral BID    albuterol (PROVENTIL VENTOLIN) nebulizer solution 2.5 mg  2.5 mg Nebulization Q4H PRN    azaTHIOprine (IMURAN) tablet 50 mg  50 mg Oral DAILY AFTER BREAKFAST    apixaban (ELIQUIS) tablet 5 mg  5 mg Oral BID    gemfibrozil (LOPID) tablet 300 mg  300 mg Oral DAILY    oxyCODONE IR (ROXICODONE) tablet 2.5-5 mg  2.5-5 mg Oral Q6H PRN    amitriptyline (ELAVIL) tablet 25 mg  25 mg Oral QHS    sodium chloride (NS) flush 5-10 mL  5-10 mL IntraVENous Q8H    sodium chloride (NS) flush 5-10 mL  5-10 mL IntraVENous PRN    ondansetron (ZOFRAN) injection 4 mg  4 mg IntraVENous Q4H PRN    piperacillin-tazobactam (ZOSYN) 3.375 g in 0.9% sodium chloride (MBP/ADV) 100 mL  3.375 g IntraVENous Q12H    fluconazole (DIFLUCAN) 200mg/100 mL IVPB (premix)  200 mg IntraVENous Q24H    HYDROmorphone (DILAUDID) injection 0.5 mg  0.5 mg IntraVENous Q4H PRN    arformoterol (BROVANA) neb solution 15 mcg  15 mcg Nebulization BID RT    And    budesonide (PULMICORT) 500 mcg/2 ml nebulizer suspension  500 mcg Nebulization BID RT    Vancomycin - pharmacy to dose   Other Rx Dosing/Monitoring     Allergies   Allergen Reactions    Compazine [Prochlorperazine Edisylate] Nausea and Vomiting and Palpitations          Subjective: Review of Systems:    A comprehensive review of systems was negative except for that written in the History of Present Illness. Objective:     Blood pressure (!) 134/99, pulse 71, temperature 97.5 °F (36.4 °C), resp. rate 18, height 5' 5\" (1.651 m), weight 66.4 kg (146 lb 4.8 oz), SpO2 95 %, unknown if currently breastfeeding. Temp (24hrs), Av.9 °F (36.6 °C), Min:97.4 °F (36.3 °C), Max:98.6 °F (37 °C)      Recent Labs      18   04218   0300  18   1342   WBC  6.5  3.6  2.5*   HGB  8.4*  8.8*  9.2*   HCT  27.8*  29.2*  30.9*   PLT  112*  105*  110*     Recent Labs      18   04218   0300  18   1342   NA  136  138  138   K  4.3  4.0  3.7   CL  98  98  97   CO2  28  31  34*   GLU  123*  59*  71   BUN  31*  17  13   CREA  4.92*  4.03*  3.68*   CA  8.6  8.5  8.5   MG  1.7   --    --    PHOS  3.0   --    --    ALB  1.3*  1.3*  1.5*   TBILI  0.8  1.3*  1.3*   SGOT  12*  19  24   ALT  <6*  6*  6*     Recent Labs      18   1342   LPSE  27*         Intake/Output Summary (Last 24 hours) at 18 1334  Last data filed at 18 0800   Gross per 24 hour   Intake              100 ml   Output                0 ml   Net              100 ml       Date 18 0700 - 18 0659   Shift [id number] 24 Hour Total   I  N  T  A  K  E   P.O. 100   100    Shift Total  (mL/kg) 100  (1.5)   100  (1.5)   O  U  T  P  U  T   Shift Total  (mL/kg)       Weight (kg) 66.4 66.4 66.4 66.4     _____________________  Physical Exam:     General:  Alert, cooperative, no distress, appears stated age. Eyes:   PERRL,  Sclera clear. Throat: Lips and mucosa dry, tongue normal.   Neck: Supple, symmetrical, trachea midline. Lungs:   Fine inspiratory bibasilar posterior crackles, clear anterior, equal bilateral lung expansion   Heart:  Regular rate and rhythm. X9H5 no rub/click/murmur/gallop   Abdomen:   hypoactive BS, distended, ascities, generalized tenderness but greater in the lower ABD, no guarding, No masses   Extremities: +2 non pitting BLE, atraumatic, no cyanosis, +2 palpable bilateral pulses   Skin: Skin color appropriate for ethnicity, scattered striae, decreased turgor  No rashes or lesions. Assessment:   Principal Problem:    Peritonitis ([address] (3/11/2018)            Plan:     Recommend CT vs IR guided removal of fluid  Will discuss with Dr. Anamaria Vanegas    Total time spent with patient: 30 minutes. Signed By: Judi Syed     March 13, 2018            I agree w/ the assessment and plan by the student. Discussed w/ Dr Maxine Ward  - IR vs CT drainage of loculated tamar'n. Dr Anamaria Vanegas to evaluate    ADDENDUM:  Drew Lorenzo MD  Pt seen and examined. CT scan reviewed. Pt has large fluid collection in abdomen from peritoneal dialysis. Dialysis catheter has been removed. Pt has abdominal discomfort but no rebound on guarding on palpation. No leukocytosis and no fever. Recommend ultrasound guided paracentesis by IR and continue antibiotic for possible subacute bacterial peritonitis.

## 2018-03-13 NOTE — PROGRESS NOTES
Drug Screen (saliva sample, as pt unable to urinate/ ESRD-HD): + for prescribed medications/ Oxycodone

## 2018-03-13 NOTE — PROGRESS NOTES
Patient name: Alex Hemphill  MRN: [id number]    Nephrology Progress note:    Assessment:  ESRD: on HD TTS  E. Coli Bacteremia (Hard copy placed in chart): source intra-abd source/ E. Coli peritonitis. Recent hx of Candida peritonitis  HTN:  Hx of PE: on Eliquis  Lupus: on plaquinil/azathioprine/prednisone  Anemia 2 to ESRD/Lupus: Hgb below goal  SHPT  Protein malnutrition    Plan/Recommendations:  HD today. Reginald made aware  Daily cultures  IV Abx per ID  Epogen  Am labs      Subjective:  C/o abd pain. Afebrile    ROS:   No nausea, no vomiting  No chest pain, no shortness of breath    Exam:  Visit Vitals    BP (!) 134/99 (BP 1 Location: Left arm, BP Patient Position: At rest)    Pulse 71    Temp 97.5 °F (36.4 °C)    Resp 18    Ht 5' 5\" (1.651 m)    Wt 66.4 kg (146 lb 4.8 oz)    SpO2 95%    BMI 24.35 kg/m2     Wt Readings from Last 3 Encounters:   03/13/18 66.4 kg (146 lb 4.8 oz)   03/05/18 68 kg (150 lb)   01/29/18 69.4 kg (153 lb)       Intake/Output Summary (Last 24 hours) at 03/13/18 1417  Last data filed at 03/13/18 0800   Gross per 24 hour   Intake              100 ml   Output                0 ml   Net              100 ml       Gen: NAD  HEENT: No icterus, mmm, no oral exudate, AT/NC  Lungs/Chest wall: Clear. No accessory muscle use  Cardiovascular: Regular rate, normal rhythm. Abdomen/: Soft, +TTP  Ext:  No peripheral edema. R AVF  CNS: alert and awake.  Answers appropriately      Current Facility-Administered Medications   Medication Dose Route Frequency Last Dose    glucose chewable tablet 16 g  4 Tab Oral PRN      dextrose (D50W) injection syrg 12.5-25 g  12.5-25 g IntraVENous PRN      glucagon (GLUCAGEN) injection 1 mg  1 mg IntraMUSCular PRN      epoetin pia (EPOGEN;PROCRIT) 14,000 Units  14,000 Units SubCUTAneous DIALYSIS TUE, THU & SAT      vancomycin (VANCOCIN) 500 mg in 0.9% sodium chloride (MBP/ADV) 100 mL  500 mg IntraVENous DIALYSIS TUE, THU & SAT      insulin lispro (HUMALOG) injection   SubCUTAneous AC&HS Stopped at 03/12/18 2200    predniSONE (DELTASONE) tablet 5 mg  5 mg Oral DAILY 5 mg at 03/13/18 0916    pantoprazole (PROTONIX) tablet 40 mg  40 mg Oral ACB 40 mg at 03/13/18 0656    allopurinol (ZYLOPRIM) tablet 100 mg  100 mg Oral DAILY AFTER BREAKFAST 100 mg at 03/13/18 0916    cyanocobalamin (VITAMIN B12) tablet 2,500 mcg  2,500 mcg Oral DAILY 2,500 mcg at 03/12/18 0910    hydroxychloroquine (PLAQUENIL) tablet 200 mg  200 mg Oral  mg at 03/13/18 0916    albuterol (PROVENTIL VENTOLIN) nebulizer solution 2.5 mg  2.5 mg Nebulization Q4H PRN      azaTHIOprine (IMURAN) tablet 50 mg  50 mg Oral DAILY AFTER BREAKFAST 50 mg at 03/13/18 0920    apixaban (ELIQUIS) tablet 5 mg  5 mg Oral BID 5 mg at 03/13/18 0916    gemfibrozil (LOPID) tablet 300 mg  300 mg Oral DAILY 300 mg at 03/13/18 0916    oxyCODONE IR (ROXICODONE) tablet 2.5-5 mg  2.5-5 mg Oral Q6H PRN 5 mg at 03/13/18 1030    amitriptyline (ELAVIL) tablet 25 mg  25 mg Oral QHS 25 mg at 03/12/18 2200    sodium chloride (NS) flush 5-10 mL  5-10 mL IntraVENous Q8H 10 mL at 03/13/18 0655    sodium chloride (NS) flush 5-10 mL  5-10 mL IntraVENous PRN 10 mL at 03/12/18 2031    ondansetron (ZOFRAN) injection 4 mg  4 mg IntraVENous Q4H PRN      piperacillin-tazobactam (ZOSYN) 3.375 g in 0.9% sodium chloride (MBP/ADV) 100 mL  3.375 g IntraVENous Q12H 3.375 g at 03/13/18 0405    fluconazole (DIFLUCAN) 200mg/100 mL IVPB (premix)  200 mg IntraVENous Q24H 200 mg at 03/12/18 2030    HYDROmorphone (DILAUDID) injection 0.5 mg  0.5 mg IntraVENous Q4H PRN 0.5 mg at 03/13/18 0405    arformoterol (BROVANA) neb solution 15 mcg  15 mcg Nebulization BID RT Stopped at 03/13/18 0800    And    budesonide (PULMICORT) 500 mcg/2 ml nebulizer suspension  500 mcg Nebulization BID RT Stopped at 03/13/18 0800    Vancomycin - pharmacy to dose   Other Rx Dosing/Monitoring         Labs/Data:    Lab Results   Component Value Date/Time    WBC 6.5 03/13/2018 04:25 AM    Hemoglobin (POC) 7.8 (L) 01/19/2018 12:30 PM    HGB 8.4 (L) 03/13/2018 04:25 AM    Hematocrit (POC) 23 (L) 01/19/2018 12:30 PM    HCT 27.8 (L) 03/13/2018 04:25 AM    PLATELET 652 (L) 35/31/8353 04:25 AM    MCV 82.5 03/13/2018 04:25 AM       Lab Results   Component Value Date/Time    Sodium 136 03/13/2018 04:25 AM    Potassium 4.3 03/13/2018 04:25 AM    Chloride 98 03/13/2018 04:25 AM    CO2 28 03/13/2018 04:25 AM    Anion gap 10 03/13/2018 04:25 AM    Glucose 123 (H) 03/13/2018 04:25 AM    BUN 31 (H) 03/13/2018 04:25 AM    Creatinine 4.92 (H) 03/13/2018 04:25 AM    BUN/Creatinine ratio 6 (L) 03/13/2018 04:25 AM    GFR est AA 12 (L) 03/13/2018 04:25 AM    GFR est non-AA 10 (L) 03/13/2018 04:25 AM    Calcium 8.6 03/13/2018 04:25 AM       Patient seen and examined. Chart reviewed. Labs, data and other pertinent notes reviewed in last 24 hrs.     Discussed with patient    Signed by:  Jose Stokes MD

## 2018-03-13 NOTE — PROGRESS NOTES
CM following for discharge planning. Note ID and surgery involvement, plan for IR vs CT drainage of loculated intraperitoneal fluid. Antibiotic plan pending culture results. May require home v. outpatient infusion if long-term abx needed beyond what can be provided 3x/week at HD clinic. CM to follow for needs pending further workup and plan.      DERRELL Sanchez

## 2018-03-13 NOTE — PROGRESS NOTES
Spiritual Care Partner Volunteer visited patient in Rm 525 on 03/13/18.   Documented by:  Chaplain Mcwilliams MDiv, MS, [address] ([id number])

## 2018-03-13 NOTE — PROGRESS NOTES
NUTRITION COMPLETE ASSESSMENT    RECOMMENDATIONS:   1. Add appetite stimulant   - Severe wt loss of 21% (37lbs) x 6 months   - may need to consider enteral feeds if intake remains low (see comments below)    2. Encourage PO intake with meals and Nepro    - document % meals consumed in flowsheet    3. Daily weights on standing scale     Interventions/Plan:   Food/Nutrient Delivery:   (regular diet + snacks) Commercial supplement (Nepro BID)          Assessment:   Reason for Assessment: [x]MST    Diet:    Supplements: none  Nutritionally Significant Medications: [x] Reviewed & Includes: allopurinol, vit B12, epogen, diflucan, SSI, protonix, zosyn, prednisone, vanco  Meal Intake:   Patient Vitals for the past 100 hrs:   % Diet Eaten   18 0800 25 %   18 1204 0 %     Pre-Hospitalization:  Usual Appetite: Poor  Diet at Home: regular  Vitamins/Supplements: Yes (1 nepro daily)    Current Hospitalization:   Appetite: Poor  PO Ability: Independent Average po intake:Less than 25%  Average supplements intake:        Subjective: \"I drink at least 1 Nepro a day. \"    Objective:  Pt admitted for peritonitis. PMHx: HTN, CKD, lupus, asthma, CHF, ESRD on HD. Abd pain for 5 days PTA. Severe sepsis secondary to peritonitis and e.coli bacteremia noted. ID following for abx. Will likely need drainage of loculated fluid, surgery consulted. Had previously been on peritoneal dialysis with switch to HD in 2017. Pt visited today. Minimal PO intake on previous admits and dietary recall today showing continues inadequate protein/energy intake (Avkcal, 55g protein):   B - skips   L - 1 chicken thigh    Snack - chips   D- meat/starch/vegtable   Snack - 1 Nepro shake  Discussed increase of Nepro to at least BID (850kcal, 38g protein) with increase protein with snacks and consumption of at least a yogurt at breakfast. Will also liberalize diet to encourage PO intake (K+ and Phos WNL).     Wt fluctuating with HD however Severe wt loss of 21% (37lbs) x 6 months. Temporal and upper extremity wasting observed with brittle hair noted. 2+ BLLE present. UBW prior to September 183#. Patient meets criteria for Severe Acute Protein Calorie Malnutrition as evidenced by:   ASPEN Malnutrition Criteria  Acute Illness, Chronic Illness, or Social/Enviornmental: Chronic illness  Energy Intake: Less than/equal to 75% of est energy req for greater than/equal to 1 month  Weight Loss: Greater than 10% x 6 mos  Body Fat: Mild  Muscle Mass: Severe  ASPEN Malnutrition Score - Chronic Illness: 19.      With increased needs on HD think it unlikely pt to meet needs orally since this has been a chronic issue. Recommend trial of appetite stimulant. If intake does note significantly improve in next 2-3 days may even needs to consider feeding tube placement which was recommended by RD on last admit. Wt Readings:   03/13/18 66.4 kg (146 lb 4.8 oz)   03/05/18 68 kg (150 lb)   01/29/18 69.4 kg (153 lb)   01/17/18 66.5 kg (146 lb 9.7 oz)   01/03/18 61.2 kg (135 lb)   12/21/17 59.2 kg (130 lb 9.6 oz)   12/08/17 65.1 kg (143 lb 9.6 oz)   12/07/17 66.2 kg (146 lb)   12/04/17 73.3 kg (161 lb 9.6 oz)   11/28/17 80.3 kg (177 lb) - admitted with BLLE and abd swelling   11/10/17 78.5 kg (173 lb)   11/06/17 75.8 kg (167 lb)   10/30/17 77.4 kg (170 lb 10.2 oz)   10/24/17 70.7 kg (155 lb 12.8 oz)   09/15/17 83 kg (183 lb)     Estimated Nutrition Needs:   Kcals/day: 1980 Kcals/day (1980-2310kcal)  Protein: 92 g (79-92g (1.2-1.4g/kg))  Fluid: 1980 ml (30ml/kg or per renal)  Based On: Kcal/kg - specify (Comment) (30-35kcal/kg)  Weight Used: Actual wt (66kg)    Pt expected to meet estimated nutrient needs:  []   Yes     []  No [x] Unable to predict at this time  Nutrition Diagnosis:   1.  Malnutrition related to inadequate protein/energy intake 2/2 poor appetite as evidenced by consuming less than 60% energy and 59% protein needs daily; severe wt loss of 21% x 6 months; muscle/fat wasting    2. (Increased protein/energy needs) related to increased energy expenditure 2/2 ESRD as evidenced by on HD, severe wt loss since start of HD    Goals:     Consumption of at least 50% of meals and 2 Nepro per day + 2 supplements/day in 5-7 days     Monitoring & Evaluation:    - Total energy intake, Liquid meal replacement   - Weight/weight change, Electrolyte and renal profile, GI profile    Previous Nutrition Goals Met:   N/A  Previous Recommendations:    N/A    Education & Discharge Needs:   [] None Identified   [x] Identified and addressed    [] Participated in care plan, discharge planning, and/or interdisciplinary rounds        Cultural, Judaism and ethnic food preferences identified: None    Skin Integrity: [x]Intact  []Other  Edema: []None [x]Other: 2+ BLLE  Last BM: 3/10  Food Allergies: [x]None []Other  Diet Restrictions: Cultural/Anglican Preference(s): None     Anthropometrics:    Weight Loss Metrics 3/13/2018 3/5/2018 1/29/2018 1/19/2018 1/17/2018 1/3/2018 12/21/2017   Today's Wt 146 lb 4.8 oz 150 lb 153 lb 146 lb 9.7 oz 146 lb 9.7 oz 135 lb 130 lb 9.6 oz   BMI 24.35 kg/m2 24.96 kg/m2 25.46 kg/m2 24.4 kg/m2 24.4 kg/m2 22.47 kg/m2 -      Weight Source: Standing scale (comment)  Height: 5' 5\" (165.1 cm),    Body mass index is 24.35 kg/(m^2).   IBW : 56.7 kg (125 lb), % IBW (Calculated): 117.04 %  Usual Body Weight: 83 kg (183 lb),      Labs:    Lab Results   Component Value Date/Time    Sodium 136 03/13/2018 04:25 AM    Potassium 4.3 03/13/2018 04:25 AM    Chloride 98 03/13/2018 04:25 AM    CO2 28 03/13/2018 04:25 AM    Glucose 123 (H) 03/13/2018 04:25 AM    BUN 31 (H) 03/13/2018 04:25 AM    Creatinine 4.92 (H) 03/13/2018 04:25 AM    Calcium 8.6 03/13/2018 04:25 AM    Magnesium 1.7 03/13/2018 04:25 AM    Phosphorus 3.0 03/13/2018 04:25 AM    Albumin 1.3 (L) 03/13/2018 04:25 AM     Lab Results   Component Value Date/Time    Hemoglobin A1c 5.4 09/25/2015 02:02 PM     Veronica Grullon, RD

## 2018-03-13 NOTE — DIABETES MGMT
DTC Progress Note    Recommendations/ Comments: Chart review for severe hypoglycemia - BG 57 mg/dL at 0539 on 3-. Otherwise BG's are in range. Current hospital DM medication: lispro correction scael, high sensitivity    Chart reviewed on [facility]. Patient is a 29 y.o. female with no hx DM. ESRD    A1c:   Lab Results   Component Value Date/Time    Hemoglobin A1c 5.4 09/25/2015 02:02 PM    Hemoglobin A1c 5.3 03/07/2014 02:16 PM       Recent Glucose Results: Lab Results   Component Value Date/Time     (H) 03/13/2018 04:25 AM    GLUCPOC 112 (H) 03/13/2018 11:53 AM    GLUCPOC 122 (H) 03/13/2018 06:17 AM    GLUCPOC 128 (H) 03/12/2018 09:26 PM        Lab Results   Component Value Date/Time    Creatinine 4.92 (H) 03/13/2018 04:25 AM     Estimated Creatinine Clearance: 14.8 mL/min (based on Cr of 4.92). Active Orders   Diet    DIET RENAL Regular        PO intake: Patient Vitals for the past 72 hrs:   % Diet Eaten   03/13/18 0800 25 %   03/12/18 1204 0 %       Will continue to follow as needed.     Thank you  Jessica Tran RN, CDE

## 2018-03-13 NOTE — PROGRESS NOTES
Clara Raygoza  is a 29 y. o.female  with a history of SLE, lupus nephritis leading to ESRD //HTN/ treated for candida peritonitis in Dec 2017  Is admitted with abdominal pain and fever since last Thursday  Pt was in [facility] in Dec 2017 for candida peritonitis and PD was stopped and she was started on HD thru a catheter. She took 4 weeks of fluconazole and was doing fine She had a graft placed on the rt arm on 1/19/19 - She developed fever and abdominal pain on 3/6 and blood culture sent from the catheter at dialysis center- The catheter was removed on 3/7 and the graft was accessed last week-   She was given IV gent  at the dialysis center. As the abdominal pain was getting worse she came to the ED on 3/11 and was admitted  CT abdomen showed Large amount of free intraperitoneal fluid that is  loculated.         Multiple hospitalizations in the past few months  OCt 14-17 /2017 for left lower lobe pneumonia/effusion- was found to have PE on the rt side  10/27-10/30 possible pneumonia- same infiltrate left lower lobe- sent on augmentin     11/26-/11/28 for b/l leg swelling and abdominal swelling     12/12/17-12/22/17- fungal peritonitis- treated with fluconazole for 4 weeks     She had  peritoneal dialysis since 2015- had cath placed in 2015 until it was removed in Dec 2017      subjective  Still has abdominal pain  Poor appetite  Objective:   VITALS:    Patient Vitals for the past 12 hrs:   Temp Pulse Resp BP SpO2   03/13/18 1625 97.3 °F (36.3 °C) 68 16 (!) 136/99 94 %   03/13/18 1224 97.5 °F (36.4 °C) 71 18 (!) 134/99 95 %   03/13/18 0742 97.4 °F (36.3 °C) 78 16 (!) 138/104 99 %   PHYSICAL EXAM:   General:                    Alert, cooperative,  .   Chronically ill ,weak  Lungs:                       B/l air entry  crepts bases     Heart:                                  s1s2  Abdomen:                  Firm swelling- tender  Extremities:               Rt arm graft okay  Skin:                                    Dry skin- striae over arms/abdomen  Lymph:                      Cervical, supraclavicular normal.  Neurologic:                Grossly non-focal     Pertinent Labs  Wbc 2.5>6.5   Hb 8.4    Lactate 1.5  Albumin 1.5  Peritoneal fluid- 3524 WBC ( 85% N)  Gram neg delfina     IMAGING RESULTS:       Impression/Recommendation  33 yr female with SLE/ lupus nephritis/HTN -h/o peritoneal dialysis until Dec 2017 /Candida peritonitis in Dec 2017/ treated with 4 weeks of fluconazole     Admitted with abdominal pain and fever of  5 days     E.coli  peritonitis with e.coli bacteremia  Because the fluid is loculated it will have to be drained completely- as this is more like abscess  Surgical consult  On vanco/zosyn and fluconazole- DC vanco and fluconazole  The susceptibility  profile  of the e.coli in blood is different from peritoneal fluid-The blood e.coli is resistant to levaquin  Continue zosyn currently- will switch to cefepime after surgery      Thrombocytopenia-if it worsens will Dc zosyn and change to cefepime     Recently treated candida peritonitis in Dec 2017- that time she was on PD     SLE-on imuran/plaquenil and prednisone     HTN-now has hypotension     PE- on elaquis     Discussed with patient and nephrologist

## 2018-03-14 ENCOUNTER — APPOINTMENT (OUTPATIENT)
Dept: ULTRASOUND IMAGING | Age: 33
DRG: 853 | End: 2018-03-14
Attending: SURGERY
Payer: MEDICARE

## 2018-03-14 ENCOUNTER — TELEPHONE (OUTPATIENT)
Dept: ONCOLOGY | Age: 33
End: 2018-03-14

## 2018-03-14 LAB
ALBUMIN SERPL-MCNC: 1.4 G/DL (ref 3.5–5)
ALBUMIN/GLOB SERPL: 0.3 {RATIO} (ref 1.1–2.2)
ALP SERPL-CCNC: 106 U/L (ref 45–117)
ALT SERPL-CCNC: 9 U/L (ref 12–78)
ANION GAP SERPL CALC-SCNC: 10 MMOL/L (ref 5–15)
APTT PPP: 35.5 SEC (ref 22.1–32)
AST SERPL-CCNC: 25 U/L (ref 15–37)
BASOPHILS # BLD: 0 K/UL (ref 0–0.1)
BASOPHILS NFR BLD: 0 % (ref 0–1)
BILIRUB SERPL-MCNC: 0.6 MG/DL (ref 0.2–1)
BUN SERPL-MCNC: 21 MG/DL (ref 6–20)
BUN/CREAT SERPL: 7 (ref 12–20)
CALCIUM SERPL-MCNC: 6.6 MG/DL (ref 8.5–10.1)
CHLORIDE SERPL-SCNC: 98 MMOL/L (ref 97–108)
CO2 SERPL-SCNC: 29 MMOL/L (ref 21–32)
CREAT SERPL-MCNC: 3.2 MG/DL (ref 0.55–1.02)
EOSINOPHIL # BLD: 0 K/UL (ref 0–0.4)
EOSINOPHIL NFR BLD: 0 % (ref 0–7)
ERYTHROCYTE [DISTWIDTH] IN BLOOD BY AUTOMATED COUNT: 18.7 % (ref 11.5–14.5)
GLOBULIN SER CALC-MCNC: 5.6 G/DL (ref 2–4)
GLUCOSE BLD STRIP.AUTO-MCNC: 108 MG/DL (ref 65–100)
GLUCOSE BLD STRIP.AUTO-MCNC: 80 MG/DL (ref 65–100)
GLUCOSE BLD STRIP.AUTO-MCNC: 92 MG/DL (ref 65–100)
GLUCOSE SERPL-MCNC: 96 MG/DL (ref 65–100)
HCT VFR BLD AUTO: 29.9 % (ref 35–47)
HGB BLD-MCNC: 9.1 G/DL (ref 11.5–16)
IMM GRANULOCYTES # BLD: 0 K/UL
IMM GRANULOCYTES NFR BLD AUTO: 0 %
INR PPP: 1.4 (ref 0.9–1.1)
LYMPHOCYTES # BLD: 1.4 K/UL (ref 0.8–3.5)
LYMPHOCYTES NFR BLD: 14 % (ref 12–49)
MAGNESIUM SERPL-MCNC: 1.9 MG/DL (ref 1.6–2.4)
MCH RBC QN AUTO: 25.3 PG (ref 26–34)
MCHC RBC AUTO-ENTMCNC: 30.4 G/DL (ref 30–36.5)
MCV RBC AUTO: 83.3 FL (ref 80–99)
MONOCYTES # BLD: 0.3 K/UL (ref 0–1)
MONOCYTES NFR BLD: 3 % (ref 5–13)
NEUTS SEG # BLD: 8.2 K/UL (ref 1.8–8)
NEUTS SEG NFR BLD: 83 % (ref 32–75)
NRBC # BLD: 0 K/UL (ref 0–0.01)
NRBC BLD-RTO: 0 PER 100 WBC
PHOSPHATE SERPL-MCNC: 2 MG/DL (ref 2.6–4.7)
PLATELET # BLD AUTO: 108 K/UL (ref 150–400)
POTASSIUM SERPL-SCNC: 3 MMOL/L (ref 3.5–5.1)
PROT SERPL-MCNC: 7 G/DL (ref 6.4–8.2)
PROTHROMBIN TIME: 14.3 SEC (ref 9–11.1)
RBC # BLD AUTO: 3.59 M/UL (ref 3.8–5.2)
RBC MORPH BLD: ABNORMAL
SERVICE CMNT-IMP: ABNORMAL
SERVICE CMNT-IMP: NORMAL
SERVICE CMNT-IMP: NORMAL
SODIUM SERPL-SCNC: 137 MMOL/L (ref 136–145)
THERAPEUTIC RANGE,PTTT: ABNORMAL SECS (ref 58–77)
WBC # BLD AUTO: 9.9 K/UL (ref 3.6–11)

## 2018-03-14 PROCEDURE — 85610 PROTHROMBIN TIME: CPT | Performed by: HOSPITALIST

## 2018-03-14 PROCEDURE — 84100 ASSAY OF PHOSPHORUS: CPT | Performed by: INTERNAL MEDICINE

## 2018-03-14 PROCEDURE — 85730 THROMBOPLASTIN TIME PARTIAL: CPT | Performed by: HOSPITALIST

## 2018-03-14 PROCEDURE — 74011250637 HC RX REV CODE- 250/637: Performed by: HOSPITALIST

## 2018-03-14 PROCEDURE — 80053 COMPREHEN METABOLIC PANEL: CPT | Performed by: INTERNAL MEDICINE

## 2018-03-14 PROCEDURE — 65660000000 HC RM CCU STEPDOWN

## 2018-03-14 PROCEDURE — 74011000250 HC RX REV CODE- 250: Performed by: RADIOLOGY

## 2018-03-14 PROCEDURE — 49083 ABD PARACENTESIS W/IMAGING: CPT

## 2018-03-14 PROCEDURE — 83735 ASSAY OF MAGNESIUM: CPT | Performed by: INTERNAL MEDICINE

## 2018-03-14 PROCEDURE — 94640 AIRWAY INHALATION TREATMENT: CPT

## 2018-03-14 PROCEDURE — 74011000250 HC RX REV CODE- 250: Performed by: HOSPITALIST

## 2018-03-14 PROCEDURE — 74011250636 HC RX REV CODE- 250/636: Performed by: INTERNAL MEDICINE

## 2018-03-14 PROCEDURE — 94664 DEMO&/EVAL PT USE INHALER: CPT

## 2018-03-14 PROCEDURE — 36415 COLL VENOUS BLD VENIPUNCTURE: CPT | Performed by: INTERNAL MEDICINE

## 2018-03-14 PROCEDURE — 0W9G30Z DRAINAGE OF PERITONEAL CAVITY WITH DRAINAGE DEVICE, PERCUTANEOUS APPROACH: ICD-10-PCS | Performed by: RADIOLOGY

## 2018-03-14 PROCEDURE — 82962 GLUCOSE BLOOD TEST: CPT

## 2018-03-14 PROCEDURE — 74011000258 HC RX REV CODE- 258: Performed by: HOSPITALIST

## 2018-03-14 PROCEDURE — 74011636637 HC RX REV CODE- 636/637: Performed by: HOSPITALIST

## 2018-03-14 PROCEDURE — 74011250636 HC RX REV CODE- 250/636: Performed by: HOSPITALIST

## 2018-03-14 PROCEDURE — 85025 COMPLETE CBC W/AUTO DIFF WBC: CPT | Performed by: STUDENT IN AN ORGANIZED HEALTH CARE EDUCATION/TRAINING PROGRAM

## 2018-03-14 RX ORDER — LIDOCAINE HYDROCHLORIDE 10 MG/ML
5 INJECTION, SOLUTION EPIDURAL; INFILTRATION; INTRACAUDAL; PERINEURAL ONCE
Status: COMPLETED | OUTPATIENT
Start: 2018-03-14 | End: 2018-03-14

## 2018-03-14 RX ORDER — LIDOCAINE HYDROCHLORIDE 10 MG/ML
INJECTION, SOLUTION EPIDURAL; INFILTRATION; INTRACAUDAL; PERINEURAL
Status: DISPENSED
Start: 2018-03-14 | End: 2018-03-14

## 2018-03-14 RX ADMIN — Medication 2500 MCG: at 08:24

## 2018-03-14 RX ADMIN — HYDROXYCHLOROQUINE SULFATE 200 MG: 200 TABLET, FILM COATED ORAL at 17:14

## 2018-03-14 RX ADMIN — OXYCODONE HYDROCHLORIDE 5 MG: 5 TABLET ORAL at 08:31

## 2018-03-14 RX ADMIN — Medication 10 ML: at 00:12

## 2018-03-14 RX ADMIN — Medication 10 ML: at 21:41

## 2018-03-14 RX ADMIN — HYDROMORPHONE HYDROCHLORIDE 0.5 MG: 2 INJECTION INTRAMUSCULAR; INTRAVENOUS; SUBCUTANEOUS at 05:42

## 2018-03-14 RX ADMIN — PIPERACILLIN SODIUM,TAZOBACTAM SODIUM 3.38 G: 3; .375 INJECTION, POWDER, FOR SOLUTION INTRAVENOUS at 17:15

## 2018-03-14 RX ADMIN — AMITRIPTYLINE HYDROCHLORIDE 25 MG: 10 TABLET, FILM COATED ORAL at 00:14

## 2018-03-14 RX ADMIN — HYDROMORPHONE HYDROCHLORIDE 0.5 MG: 2 INJECTION INTRAMUSCULAR; INTRAVENOUS; SUBCUTANEOUS at 00:12

## 2018-03-14 RX ADMIN — HYDROMORPHONE HYDROCHLORIDE 0.5 MG: 2 INJECTION INTRAMUSCULAR; INTRAVENOUS; SUBCUTANEOUS at 13:02

## 2018-03-14 RX ADMIN — Medication 10 ML: at 05:45

## 2018-03-14 RX ADMIN — ALLOPURINOL 100 MG: 100 TABLET ORAL at 08:24

## 2018-03-14 RX ADMIN — HYDROMORPHONE HYDROCHLORIDE 0.5 MG: 2 INJECTION INTRAMUSCULAR; INTRAVENOUS; SUBCUTANEOUS at 21:41

## 2018-03-14 RX ADMIN — AZATHIOPRINE 50 MG: 50 TABLET ORAL at 08:27

## 2018-03-14 RX ADMIN — LIDOCAINE HYDROCHLORIDE 10 ML: 10 INJECTION, SOLUTION EPIDURAL; INFILTRATION; INTRACAUDAL; PERINEURAL at 12:16

## 2018-03-14 RX ADMIN — BUDESONIDE 500 MCG: 0.5 INHALANT RESPIRATORY (INHALATION) at 23:03

## 2018-03-14 RX ADMIN — ERYTHROPOIETIN 14000 UNITS: 10000 INJECTION, SOLUTION INTRAVENOUS; SUBCUTANEOUS at 02:22

## 2018-03-14 RX ADMIN — ARFORMOTEROL TARTRATE 15 MCG: 15 SOLUTION RESPIRATORY (INHALATION) at 08:18

## 2018-03-14 RX ADMIN — AMITRIPTYLINE HYDROCHLORIDE 25 MG: 10 TABLET, FILM COATED ORAL at 21:42

## 2018-03-14 RX ADMIN — HYDROXYCHLOROQUINE SULFATE 200 MG: 200 TABLET, FILM COATED ORAL at 08:23

## 2018-03-14 RX ADMIN — OXYCODONE HYDROCHLORIDE 5 MG: 5 TABLET ORAL at 02:21

## 2018-03-14 RX ADMIN — ARFORMOTEROL TARTRATE 15 MCG: 15 SOLUTION RESPIRATORY (INHALATION) at 23:03

## 2018-03-14 RX ADMIN — BUDESONIDE 500 MCG: 0.5 INHALANT RESPIRATORY (INHALATION) at 08:19

## 2018-03-14 RX ADMIN — GEMFIBROZIL 300 MG: 600 TABLET ORAL at 08:24

## 2018-03-14 RX ADMIN — PIPERACILLIN SODIUM,TAZOBACTAM SODIUM 3.38 G: 3; .375 INJECTION, POWDER, FOR SOLUTION INTRAVENOUS at 04:35

## 2018-03-14 RX ADMIN — PREDNISONE 5 MG: 5 TABLET ORAL at 08:24

## 2018-03-14 NOTE — PROGRESS NOTES
2300 Pt with complaint of shortness of breath. Respiratory rate 24, heart rate 108. Breath sounds are clear but diminished at bases. Unable to obtain oxygen saturation via pulse ox. Oxygen applied at 3L NC for comfort. Pt receiving hemodialysis. Will continue to monitor. 2350  Pt states that breathing has improved. Will continue to monitor.

## 2018-03-14 NOTE — ROUTINE PROCESS
TRANSFER - OUT REPORT:    Verbal report given to Hilary(name) on Perales McCone  being transferred to [facility](unit) for routine progression of care       Report consisted of patients Situation, Background, Assessment and   Recommendations(SBAR). Information from the following report(s) SBAR, Procedure Summary and MAR was reviewed with the receiving nurse. Lines:   Peripheral IV 03/14/18 Left;Mid Forearm (Active)        Opportunity for questions and clarification was provided.       Patient transported with:   [employer]

## 2018-03-14 NOTE — TELEPHONE ENCOUNTER
CONSULT:    Patient:  Yimi Palomares : 87    Referring Physician: Dr. Anton Solorio    Reason for Consult: PE    Room:  # 525A

## 2018-03-14 NOTE — CONSULTS
[facility] at [address], [address] [place]: [phone]  F: [phone]    Reason for Visit:   Sarthak Glover is a 29 y.o. female who is seen in consultation at the request of Dr. Richard Reina for evaluation of history of PE. Treatment History:   · Coumadin started 4/2013 for PE  · 10/2017 VQ scan showed high probability of PE, patient stopped Coumadin and was started on Eliquis     History of Present Illness:   Patient has a PMH of SLE, end-stage renal disease (on hemodialysis, TTS), pulmonary embolism, on Eliquis (2012), hyperlipidemia, hypertension, recent Candida peritonitis, chronic bilateral pain, admitted with abdominal pain. CT of the abdomen showed large amount of free intraperitoneal fluid that is loculated. She is now followed by ID for E. Coli peritonitis with E. Coli bacteremia. She is s/p US guided paracentesis today and she remains on Zosyn. Ms. David Glaser has a history of PE initially diagnosed in 4/2013 and she was placed on Coumadin at that time. She saw Dr. Shon Lozano in the outpatient clinic in 2014 and coumadin was continued due to concern for high risk of clot development with uncontrolled lupus. She continued on Coumadin until 10/2017 when VQ scan showed high probability of PE during a recent hospitalization. She was switched to Eliquis at that time. She reports she has tolerated Eliquis well and has had no clots develop while taking Eliquis. Past Medical History:   Diagnosis Date    Anemia     secondary to lupus    Asthma     no inhaler use in past 2 to 3 years    Carditis     Chronic kidney disease     ESRD    Chronic pain     DDD (degenerative disc disease), lumbar     ESRD (end stage renal disease) (HCC)     GERD (gastroesophageal reflux disease)     Heart failure ([facility] [address] [facility]     Hemodialysis patient ([facility] [address] 12/21/2017    [address]  Tuesday,  Thursday,  and Saturday.      Hypercholesterolemia     Hypertension  Intractable nausea and vomiting 10/21/2015    Long term (current) use of anticoagulants     Lupus     Lupus (systemic lupus erythematosus) (HCC)     Malignant hypertension with chronic kidney disease stage V ([facility] [address]     Peritoneal dialysis status ([facility] [address] 10/2015    x 2 years Stopped 2017 due to infection and removed.  Poor historian 2018    With medications    Thromboembolus ([facility] [address] 2013    lungs    Transfusion history     Last Transfusion 2017  at [facility]      Past Surgical History:   Procedure Laterality Date    HX  SECTION  11/2006    x1    HX OTHER SURGICAL  9/16/15    INSERTION PD CATH;  Removed 2017    HX VASCULAR ACCESS Right 2017    Double-Lumen henry catheter upper chest      Social History   Substance Use Topics    Smoking status: Never Smoker    Smokeless tobacco: Never Used    Alcohol use No      Family History   Problem Relation Age of Onset    Diabetes Father     Hypertension Father     Cancer Other      aunt with breast cancer    Diabetes Mother      Current Facility-Administered Medications   Medication Dose Route Frequency    HYDROmorphone (DILAUDID) injection 0.5 mg  0.5 mg IntraVENous Q3H PRN    glucose chewable tablet 16 g  4 Tab Oral PRN    dextrose (D50W) injection syrg 12.5-25 g  12.5-25 g IntraVENous PRN    glucagon (GLUCAGEN) injection 1 mg  1 mg IntraMUSCular PRN    epoetin pia (EPOGEN;PROCRIT) 14,000 Units  14,000 Units SubCUTAneous DIALYSIS TUE, THU & SAT    insulin lispro (HUMALOG) injection   SubCUTAneous AC&HS    predniSONE (DELTASONE) tablet 5 mg  5 mg Oral DAILY    pantoprazole (PROTONIX) tablet 40 mg  40 mg Oral ACB    allopurinol (ZYLOPRIM) tablet 100 mg  100 mg Oral DAILY AFTER BREAKFAST    cyanocobalamin (VITAMIN B12) tablet 2,500 mcg  2,500 mcg Oral DAILY    hydroxychloroquine (PLAQUENIL) tablet 200 mg  200 mg Oral BID    albuterol (PROVENTIL VENTOLIN) nebulizer solution 2.5 mg  2.5 mg Nebulization Q4H PRN    azaTHIOprine (IMURAN) tablet 50 mg  50 mg Oral DAILY AFTER BREAKFAST    gemfibrozil (LOPID) tablet 300 mg  300 mg Oral DAILY    oxyCODONE IR (ROXICODONE) tablet 2.5-5 mg  2.5-5 mg Oral Q6H PRN    amitriptyline (ELAVIL) tablet 25 mg  25 mg Oral QHS    sodium chloride (NS) flush 5-10 mL  5-10 mL IntraVENous Q8H    sodium chloride (NS) flush 5-10 mL  5-10 mL IntraVENous PRN    ondansetron (ZOFRAN) injection 4 mg  4 mg IntraVENous Q4H PRN    piperacillin-tazobactam (ZOSYN) 3.375 g in 0.9% sodium chloride (MBP/ADV) 100 mL  3.375 g IntraVENous Q12H    arformoterol (BROVANA) neb solution 15 mcg  15 mcg Nebulization BID RT    And    budesonide (PULMICORT) 500 mcg/2 ml nebulizer suspension  500 mcg Nebulization BID RT      Allergies   Allergen Reactions    Compazine [Prochlorperazine Edisylate] Nausea and Vomiting and Palpitations        Review of Systems: A complete review of systems was obtained, negative except as described above. Physical Exam:     Visit Vitals    /76    Pulse (!) 105    Temp 98 °F (36.7 °C)    Resp 20    Ht 5' 5\" (1.651 m)    Wt 156 lb 4.8 oz (70.9 kg)    SpO2 96%    BMI 26.01 kg/m2     ECOG PS: 3  General: No distress  Eyes:  anicteric sclerae  HENT: Atraumatic, OP clear  Neck: Supple  Respiratory: CTAB, normal respiratory effort  CV: Normal rate, regular rhythm, bilateral lower extremity edema noted  GI: Distended. Drain noted right lower abdomen, dressing intact. MS: Gait no observed. Skin: No rashes, ecchymoses, or petechiae. Psych: Alert, oriented    Results:     Lab Results   Component Value Date/Time    WBC 9.9 03/14/2018 04:50 AM    HGB 9.1 (L) 03/14/2018 04:50 AM    HCT 29.9 (L) 03/14/2018 04:50 AM    PLATELET 713 (L) 31/00/2073 04:50 AM    MCV 83.3 03/14/2018 04:50 AM    ABS.  NEUTROPHILS 8.2 (H) 03/14/2018 04:50 AM    Hemoglobin (POC) 7.8 (L) 01/19/2018 12:30 PM    Hematocrit (POC) 23 (L) 01/19/2018 12:30 PM     Lab Results   Component Value Date/Time    Sodium 137 03/14/2018 04:50 AM    Potassium 3.0 (L) 03/14/2018 04:50 AM    Chloride 98 03/14/2018 04:50 AM    CO2 29 03/14/2018 04:50 AM    Glucose 96 03/14/2018 04:50 AM    BUN 21 (H) 03/14/2018 04:50 AM    Creatinine 3.20 (H) 03/14/2018 04:50 AM    GFR est AA 20 (L) 03/14/2018 04:50 AM    GFR est non-AA 17 (L) 03/14/2018 04:50 AM    Calcium 6.6 (L) 03/14/2018 04:50 AM    Sodium (POC) 137 01/19/2018 12:30 PM    Potassium (POC) 4.4 01/19/2018 12:30 PM    Chloride (POC) 96 (L) 01/19/2018 12:30 PM    Glucose (POC) 92 03/14/2018 04:04 PM    BUN (POC) 24 (H) 01/19/2018 12:30 PM    Creatinine (POC) 5.5 (H) 01/19/2018 12:30 PM    Calcium, ionized (POC) 1.14 01/19/2018 12:30 PM     Lab Results   Component Value Date/Time    Bilirubin, total 0.6 03/14/2018 04:50 AM    ALT (SGPT) 9 (L) 03/14/2018 04:50 AM    AST (SGOT) 25 03/14/2018 04:50 AM    Alk. phosphatase 106 03/14/2018 04:50 AM    Protein, total 7.0 03/14/2018 04:50 AM    Albumin 1.4 (L) 03/14/2018 04:50 AM    Globulin 5.6 (H) 03/14/2018 04:50 AM     Records reviewed and summarized above. Radiology report(s) reviewed above. Assessment:   1) History of PE. Per review of the medical record, she has had 1 known PE and a suspected 2nd PE. Patient was initially started on Coumadin in 4/2013 for PE. She continued on this and was switched to Eliquis in 10/2017 when VQ scan showed high probability of PE. Patient reports she tolerates Eliquis well and has had no bleeding on this. Patient remains at high risk for recurrent clot with frequent hospitalizations and lupus. For this reason, would recommend continuing patient on anticoagulation indefinetely with Eliquis as long as benefit outweighs risk. 2) Thrombocytopenia. Platelets 795 today and have been stable over the past 4 days. Previously were normal prior to admission. Suspect thrombocytopenia is secondary to acute infection with peritonitis. Continue to monitor daily CBC's. 3) Anemia.  History of vitamin B12 deficiency per Dr. Oswald Narrow note, will recheck vitamin B12 level. This has been a chronic problem upon review of past labs and is most likely anemia of chronic disease and renal failure. 4) Peritonitis. Followed by ID, s/p paracentesis today. She remains on Zosyn. I appreciate the opportunity to participate in Ms. Hannah Parker care. We will continue to follow along, please call with any questions.   Pt seen today in conjunction with NP Nini Minor      Signed By: Rishi Alves, DO

## 2018-03-14 NOTE — PROGRESS NOTES
Problem: Nutrition Deficit  Goal: *Optimize nutritional status  Outcome: Not Progressing Towards Goal  Pt. has a poor appetite in the morning. Pt. Needs more nutrition and intake. Continued to monitor Pt.

## 2018-03-14 NOTE — PROGRESS NOTES
Patient name: Edgar Ahuja  MRN: [id number]    Nephrology Progress note:    Assessment:  ESRD: on HD TTS  E. Coli Bacteremia (Hard copy placed in chart): source intra-abd source/ E. Coli peritonitis. IV zosyn. Recent hx of Candida peritonitis  HTN:  Hx of PE: on Eliquis  Lupus: on plaquinil/azathioprine/prednisone  Anemia 2 to ESRD/Lupus: Hgb below goal  SHPT  Protein malnutrition    Plan/Recommendations:  HD tomorrow  F/u cultures  IV Abx per ID  Epogen  Nepro  Am labs      Subjective:  HD late last night. 1.1UF. Paracentesis with drain this morning by IR. Abd pain improving    ROS:   No nausea, no vomiting  No chest pain, no shortness of breath    Exam:  Visit Vitals    /76    Pulse (!) 105    Temp 98 °F (36.7 °C)    Resp 20    Ht 5' 5\" (1.651 m)    Wt 70.9 kg (156 lb 4.8 oz)    SpO2 96%    BMI 26.01 kg/m2     Wt Readings from Last 3 Encounters:   03/14/18 70.9 kg (156 lb 4.8 oz)   03/05/18 68 kg (150 lb)   01/29/18 69.4 kg (153 lb)       Intake/Output Summary (Last 24 hours) at 03/14/18 1542  Last data filed at 03/14/18 1328   Gross per 24 hour   Intake               50 ml   Output             4900 ml   Net            -4850 ml       Gen: NAD  HEENT: No icterus, mmm, no oral exudate, AT/NC  Lungs/Chest wall: Clear. No accessory muscle use  Cardiovascular: Regular rate, normal rhythm. Abdomen/: Soft, drain  Ext:  No peripheral edema. R AVF  CNS: alert and awake.  Answers appropriately      Current Facility-Administered Medications   Medication Dose Route Frequency Last Dose    HYDROmorphone (DILAUDID) injection 0.5 mg  0.5 mg IntraVENous Q3H PRN 0.5 mg at 03/14/18 1302    glucose chewable tablet 16 g  4 Tab Oral PRN      dextrose (D50W) injection syrg 12.5-25 g  12.5-25 g IntraVENous PRN      glucagon (GLUCAGEN) injection 1 mg  1 mg IntraMUSCular PRN      epoetin pia (EPOGEN;PROCRIT) 14,000 Units  14,000 Units SubCUTAneous DIALYSIS TUE, THU & SAT 14,000 Units at 03/14/18 0222    insulin lispro (HUMALOG) injection   SubCUTAneous AC&HS Stopped at 03/12/18 2200    predniSONE (DELTASONE) tablet 5 mg  5 mg Oral DAILY 5 mg at 03/14/18 0824    pantoprazole (PROTONIX) tablet 40 mg  40 mg Oral ACB Stopped at 03/14/18 0730    allopurinol (ZYLOPRIM) tablet 100 mg  100 mg Oral DAILY AFTER BREAKFAST 100 mg at 03/14/18 0824    cyanocobalamin (VITAMIN B12) tablet 2,500 mcg  2,500 mcg Oral DAILY 2,500 mcg at 03/14/18 0824    hydroxychloroquine (PLAQUENIL) tablet 200 mg  200 mg Oral  mg at 03/14/18 0823    albuterol (PROVENTIL VENTOLIN) nebulizer solution 2.5 mg  2.5 mg Nebulization Q4H PRN      azaTHIOprine (IMURAN) tablet 50 mg  50 mg Oral DAILY AFTER BREAKFAST 50 mg at 03/14/18 0827    gemfibrozil (LOPID) tablet 300 mg  300 mg Oral DAILY 300 mg at 03/14/18 0824    oxyCODONE IR (ROXICODONE) tablet 2.5-5 mg  2.5-5 mg Oral Q6H PRN 5 mg at 03/14/18 0831    amitriptyline (ELAVIL) tablet 25 mg  25 mg Oral QHS 25 mg at 03/14/18 0014    sodium chloride (NS) flush 5-10 mL  5-10 mL IntraVENous Q8H 10 mL at 03/14/18 0545    sodium chloride (NS) flush 5-10 mL  5-10 mL IntraVENous PRN 10 mL at 03/13/18 2053    ondansetron (ZOFRAN) injection 4 mg  4 mg IntraVENous Q4H PRN      piperacillin-tazobactam (ZOSYN) 3.375 g in 0.9% sodium chloride (MBP/ADV) 100 mL  3.375 g IntraVENous Q12H 3.375 g at 03/14/18 0435    arformoterol (BROVANA) neb solution 15 mcg  15 mcg Nebulization BID RT 15 mcg at 03/14/18 0818    And    budesonide (PULMICORT) 500 mcg/2 ml nebulizer suspension  500 mcg Nebulization BID  mcg at 03/14/18 4733       Labs/Data:    Lab Results   Component Value Date/Time    WBC 9.9 03/14/2018 04:50 AM    Hemoglobin (POC) 7.8 (L) 01/19/2018 12:30 PM    HGB 9.1 (L) 03/14/2018 04:50 AM    Hematocrit (POC) 23 (L) 01/19/2018 12:30 PM    HCT 29.9 (L) 03/14/2018 04:50 AM    PLATELET 577 (L) 79/81/1545 04:50 AM    MCV 83.3 03/14/2018 04:50 AM       Lab Results   Component Value Date/Time    Sodium 137 03/14/2018 04:50 AM    Potassium 3.0 (L) 03/14/2018 04:50 AM    Chloride 98 03/14/2018 04:50 AM    CO2 29 03/14/2018 04:50 AM    Anion gap 10 03/14/2018 04:50 AM    Glucose 96 03/14/2018 04:50 AM    BUN 21 (H) 03/14/2018 04:50 AM    Creatinine 3.20 (H) 03/14/2018 04:50 AM    BUN/Creatinine ratio 7 (L) 03/14/2018 04:50 AM    GFR est AA 20 (L) 03/14/2018 04:50 AM    GFR est non-AA 17 (L) 03/14/2018 04:50 AM    Calcium 6.6 (L) 03/14/2018 04:50 AM       Patient seen and examined. Chart reviewed. Labs, data and other pertinent notes reviewed in last 24 hrs.     Discussed with patient    Signed by:  Noelle Cha MD

## 2018-03-14 NOTE — PROGRESS NOTES
Hospitalist Progress Note  Rodney Cope MD  Answering service: [phone] from in house phone  Cell: [phone]      Date of Service:  3/14/2018  NAME:  Kentrell Villarreal  :  1985  MRN:  [id number]      Admission Summary: This is a 52-year-old female with multiple medical problems including SLE, end-stage renal disease (on hemodialysis, TTS), pulmonary embolism, on Eliquis (), hyperlipidemia, hypertension, recent Candida peritonitis, chronic bilateral pain. She comes over here because of abdominal pain since last 4 or 5 days.     The patient claims that last Wednesday, that is about 4 days ago, the patient started having fever and at that time, she noted that her temperature was 100.4 degrees Fahrenheit. At the same time, she also started having abdominal pain. She describes her pain as throughout the whole abdomen, sharp 7/10 to 9/10 in intensity off and on, nonradiating, slightly gets better with pain medication, but there was no triggering factor whatsoever. In any case, the next day the patient went for dialysis. She claims that over there, they did the blood cultures and put her on antibiotics. She was told that in the blood culture, there was a growth of bacteria, but she does not remember the name of the bacteria. She also does not remember the name of the antibiotic as well. Please also note that recently the patient was switched to hemodialysis and subsequently received a graft. Last Wednesday, her dialysis catheter was taken out. When the patient's abdominal pain continued then today she decided to come into the hospital.  The patient claims that she did not have any other symptoms including chest pain, shortness of breath, cough, nausea, vomiting, headache, dizziness.   No changes in bowel movements.       Interval history / Subjective:     F/u Abdominal pain   Abdominal pain and SOB improved after paracentesis  Feeling better  Assessment & Plan:     Acute peritonitis  -recent candida peritonitis  -CT abd 3/11 Large amount of free intraperitoneal fluid appears somewhat loculated  -Follow cultures, blood culture 3/11 unremarkable. Peritoneal fluid heavy E coli  -On Vanc/Zosyn/Fluconazole. Now Vanc/FLuconazole discontinued. Continue Zosyn  -Appreciate Surgery, recommended US paracentesis  -s/p paracentesis, follow studies. Already >4l of peritoneal fluid drained out. Will clamp it now, would open it again tomorrow am    E coli bacteremia/Sepsis  -Spoke to Dr Rishi Kim who told me that the patient was found to have E coli bacteremia at HD    Hypotension  -now resolved  -Was on stress steroids  -Now on regular oral steroids    Thrombocytopenia   -Monitor closely  -Will switch Zosyn to cefepime if gets worse    Bilateral pedal edema  -Dopplers negative for DVT    SLE  -continue home meds    History of PE  -on Eliquis since 2012  -not sure if she should be on eliquis for that long  -Appreciate discussion with PMD 3/13. The patient was seen by hematology in 2013 but seems was lost to follow up. I think at that time the plan was to stop anticoagulation if her SLE is stable. Spoke to Dr Kingsley Nice, he feels that the patient can come off Eliquis. His records do not suggest any recurrence of DVT/PE.  Spoke to Dr Amirah Keller, he has not seen the patient since >1 yr. If antiphospholipid antibodies negative then the patient can come off Eliquis    ESRD  -on HD TTS    Anemia  -of chronic disease    Severe hepatic steatosis  -monitor    HTN  -stable    Severe protein calorie malnutrition  -Consult nutrition  -nutritional supp    Renal diet    Code status: FULL CODE  DVT prophylaxis: Eliquis  PTA: home    Plan: Follow ID, nephrology, surgery    Care Plan discussed with: Patient/Family  Disposition: Home w/Family     Hospital Problems  Date Reviewed: 3/11/2018          Codes Class Noted POA    * (Principal)Peritonitis ([facility] [address] ICD-10-CM: K65.9  ICD-9-CM: 567.9  3/11/2018 Yes                Review of Systems:   A comprehensive review of systems was negative except for that written in the HPI. Vital Signs:    Last 24hrs VS reviewed since prior progress note. Most recent are:  Visit Vitals    /87 (BP 1 Location: Left arm, BP Patient Position: At rest;Supine)    Pulse 82    Temp 97.8 °F (36.6 °C)    Resp 18    Ht 5' 5\" (1.651 m)    Wt 70.9 kg (156 lb 4.8 oz)    SpO2 97%    BMI 26.01 kg/m2         Intake/Output Summary (Last 24 hours) at 03/14/18 0932  Last data filed at 03/14/18 0136   Gross per 24 hour   Intake                0 ml   Output             1100 ml   Net            -1100 ml        Physical Examination:             Constitutional:  No acute distress, cooperative, pleasant    ENT:  Oral mucous moist, oropharynx benign. Neck supple,    Resp:  CTA bilaterally. No wheezing/rhonchi/rales. No accessory muscle use   CV:  Regular rhythm, normal rate, no murmurs, gallops, rubs    GI:  Soft, non distended, non tender. normoactive bowel sounds, no hepatosplenomegaly     Musculoskeletal:  No edema, warm, 2+ pulses throughout    Neurologic:  Moves all extremities.   AAOx3, CN II-XII reviewed     Skin:  Good turgor, no rashes or ulcers       Data Review:    Review and/or order of clinical lab test      Labs:     Recent Labs      03/14/18 0450 03/13/18 0425   WBC  9.9  6.5   HGB  9.1*  8.4*   HCT  29.9*  27.8*   PLT  108*  112*     Recent Labs      03/14/18 0450 03/13/18 0425  03/12/18   0300   NA  137  136  138   K  3.0*  4.3  4.0   CL  98  98  98   CO2  29  28  31   BUN  21*  31*  17   CREA  3.20*  4.92*  4.03*   GLU  96  123*  59*   CA  6.6*  8.6  8.5   MG  1.9  1.7   --    PHOS  2.0*  3.0   --      Recent Labs      03/14/18   0450 03/13/18   0425  03/12/18   0300  03/11/18   1342   SGOT  25  12*  19  24   ALT  9*  <6*  6*  6*   AP  106  73  71  73   TBILI  0.6  0.8  1.3*  1.3*   TP  7.0  6.7  6.9  7.4   ALB  1.4*  1.3* 1. 3*  1.5*   GLOB  5.6*  5.4*  5.6*  5.9*   LPSE   --    --    --   27*     Recent Labs      03/14/18   0450   INR  1.4*   PTP  14.3*   APTT  35.5*      No results for input(s): FE, TIBC, PSAT, FERR in the last 72 hours. Lab Results   Component Value Date/Time    Folate 3.9 11/06/2017 11:49 AM      No results for input(s): PH, PCO2, PO2 in the last 72 hours. No results for input(s): CPK, CKNDX, TROIQ in the last 72 hours.     No lab exists for component: CPKMB  Lab Results   Component Value Date/Time    Cholesterol, total 117 09/25/2015 02:02 PM    HDL Cholesterol 31 (L) 09/25/2015 02:02 PM    LDL, calculated 57 09/25/2015 02:02 PM    Triglyceride 146 09/25/2015 02:02 PM    CHOL/HDL Ratio 7.7 (H) 05/31/2009 10:30 AM     Lab Results   Component Value Date/Time    Glucose (POC) 80 03/14/2018 07:03 AM    Glucose (POC) 96 03/13/2018 09:32 PM    Glucose (POC) 111 (H) 03/13/2018 05:11 PM    Glucose (POC) 112 (H) 03/13/2018 11:53 AM    Glucose (POC) 122 (H) 03/13/2018 06:17 AM     Lab Results   Component Value Date/Time    Color YELLOW/STRAW 08/12/2016 09:06 PM    Appearance CLEAR 08/12/2016 09:06 PM    Specific gravity 1.017 08/12/2016 09:06 PM    Specific gravity 1.010 07/11/2016 12:44 PM    pH (UA) 7.0 08/12/2016 09:06 PM    Protein 300 (A) 08/12/2016 09:06 PM    Glucose NEGATIVE  08/12/2016 09:06 PM    Ketone TRACE (A) 08/12/2016 09:06 PM    Bilirubin NEGATIVE  07/11/2016 12:44 PM    Urobilinogen 1.0 08/12/2016 09:06 PM    Nitrites NEGATIVE  08/12/2016 09:06 PM    Leukocyte Esterase NEGATIVE  08/12/2016 09:06 PM    Epithelial cells MODERATE (A) 08/12/2016 09:06 PM    Bacteria 1+ (A) 08/12/2016 09:06 PM    WBC 0-4 08/12/2016 09:06 PM    RBC 0-5 08/12/2016 09:06 PM         Medications Reviewed:     Current Facility-Administered Medications   Medication Dose Route Frequency    HYDROmorphone (DILAUDID) injection 0.5 mg  0.5 mg IntraVENous Q3H PRN    glucose chewable tablet 16 g  4 Tab Oral PRN    dextrose (D50W) injection syrg 12.5-25 g  12.5-25 g IntraVENous PRN    glucagon (GLUCAGEN) injection 1 mg  1 mg IntraMUSCular PRN    epoetin pia (EPOGEN;PROCRIT) 14,000 Units  14,000 Units SubCUTAneous DIALYSIS TUE, THU & SAT    insulin lispro (HUMALOG) injection   SubCUTAneous AC&HS    predniSONE (DELTASONE) tablet 5 mg  5 mg Oral DAILY    pantoprazole (PROTONIX) tablet 40 mg  40 mg Oral ACB    allopurinol (ZYLOPRIM) tablet 100 mg  100 mg Oral DAILY AFTER BREAKFAST    cyanocobalamin (VITAMIN B12) tablet 2,500 mcg  2,500 mcg Oral DAILY    hydroxychloroquine (PLAQUENIL) tablet 200 mg  200 mg Oral BID    albuterol (PROVENTIL VENTOLIN) nebulizer solution 2.5 mg  2.5 mg Nebulization Q4H PRN    azaTHIOprine (IMURAN) tablet 50 mg  50 mg Oral DAILY AFTER BREAKFAST    apixaban (ELIQUIS) tablet 5 mg  5 mg Oral BID    gemfibrozil (LOPID) tablet 300 mg  300 mg Oral DAILY    oxyCODONE IR (ROXICODONE) tablet 2.5-5 mg  2.5-5 mg Oral Q6H PRN    amitriptyline (ELAVIL) tablet 25 mg  25 mg Oral QHS    sodium chloride (NS) flush 5-10 mL  5-10 mL IntraVENous Q8H    sodium chloride (NS) flush 5-10 mL  5-10 mL IntraVENous PRN    ondansetron (ZOFRAN) injection 4 mg  4 mg IntraVENous Q4H PRN    piperacillin-tazobactam (ZOSYN) 3.375 g in 0.9% sodium chloride (MBP/ADV) 100 mL  3.375 g IntraVENous Q12H    fluconazole (DIFLUCAN) 200mg/100 mL IVPB (premix)  200 mg IntraVENous Q24H    arformoterol (BROVANA) neb solution 15 mcg  15 mcg Nebulization BID RT    And    budesonide (PULMICORT) 500 mcg/2 ml nebulizer suspension  500 mcg Nebulization BID RT     ______________________________________________________________________  EXPECTED LENGTH OF STAY: 4d 21h  ACTUAL LENGTH OF STAY:          3                 Shivam Bonds MD

## 2018-03-14 NOTE — PROGRESS NOTES
Progress Note    Patient: Garrison Urena MRN: [id number]  SSN: [id number]    YOB: 1985  Age: 29 y.o. Sex: female      Admit Date: 3/11/2018    Subacute bacterial peritonitis    Subjective:     No acute surgical issues. Pt had paracentesis today. Reported she is feeling much better. Only hurts at incision site. Tolerating diet without nausea or vomiting. Objective:     Visit Vitals    /72    Pulse 94    Temp 98.1 °F (36.7 °C)    Resp 20    Ht 5' 5\" (1.651 m)    Wt 156 lb 4.8 oz (70.9 kg)    SpO2 95%    BMI 26.01 kg/m2       Temp (24hrs), Av.7 °F (36.5 °C), Min:97 °F (36.1 °C), Max:98.2 °F (36.8 °C)        Physical Exam:    Gen:  NAD  Pulm:  Unlabored  Abd:  S/ND/mild TTP      Recent Results (from the past 24 hour(s))   GLUCOSE, POC    Collection Time: 18  9:32 PM   Result Value Ref Range    Glucose (POC) 96 65 - 100 mg/dL    Performed by Magali Jones    CULTURE, BLOOD, PAIRED    Collection Time: 18 10:51 PM   Result Value Ref Range    Special Requests: NO SPECIAL REQUESTS      Culture result: NO GROWTH AFTER 11 HOURS     MAGNESIUM    Collection Time: 18  4:50 AM   Result Value Ref Range    Magnesium 1.9 1.6 - 2.4 mg/dL   PHOSPHORUS    Collection Time: 18  4:50 AM   Result Value Ref Range    Phosphorus 2.0 (L) 2.6 - 4.7 MG/DL   METABOLIC PANEL, COMPREHENSIVE    Collection Time: 18  4:50 AM   Result Value Ref Range    Sodium 137 136 - 145 mmol/L    Potassium 3.0 (L) 3.5 - 5.1 mmol/L    Chloride 98 97 - 108 mmol/L    CO2 29 21 - 32 mmol/L    Anion gap 10 5 - 15 mmol/L    Glucose 96 65 - 100 mg/dL    BUN 21 (H) 6 - 20 MG/DL    Creatinine 3.20 (H) 0.55 - 1.02 MG/DL    BUN/Creatinine ratio 7 (L) 12 - 20      GFR est AA 20 (L) >60 ml/min/1.73m2    GFR est non-AA 17 (L) >60 ml/min/1.73m2    Calcium 6.6 (L) 8.5 - 10.1 MG/DL    Bilirubin, total 0.6 0.2 - 1.0 MG/DL    ALT (SGPT) 9 (L) 12 - 78 U/L    AST (SGOT) 25 15 - 37 U/L    Alk.  phosphatase 106 45 - 117 U/L    Protein, total 7.0 6.4 - 8.2 g/dL    Albumin 1.4 (L) 3.5 - 5.0 g/dL    Globulin 5.6 (H) 2.0 - 4.0 g/dL    A-G Ratio 0.3 (L) 1.1 - 2.2     PROTHROMBIN TIME + INR    Collection Time: 03/14/18  4:50 AM   Result Value Ref Range    INR 1.4 (H) 0.9 - 1.1      Prothrombin time 14.3 (H) 9.0 - 11.1 sec   PTT    Collection Time: 03/14/18  4:50 AM   Result Value Ref Range    aPTT 35.5 (H) 22.1 - 32.0 sec    aPTT, therapeutic range     58.0 - 77.0 SECS   CBC WITH AUTOMATED DIFF    Collection Time: 03/14/18  4:50 AM   Result Value Ref Range    WBC 9.9 3.6 - 11.0 K/uL    RBC 3.59 (L) 3.80 - 5.20 M/uL    HGB 9.1 (L) 11.5 - 16.0 g/dL    HCT 29.9 (L) 35.0 - 47.0 %    MCV 83.3 80.0 - 99.0 FL    MCH 25.3 (L) 26.0 - 34.0 PG    MCHC 30.4 30.0 - 36.5 g/dL    RDW 18.7 (H) 11.5 - 14.5 %    PLATELET 888 (L) 252 - 400 K/uL    NRBC 0.0 0  WBC    ABSOLUTE NRBC 0.00 0.00 - 0.01 K/uL    NEUTROPHILS 83 (H) 32 - 75 %    LYMPHOCYTES 14 12 - 49 %    MONOCYTES 3 (L) 5 - 13 %    EOSINOPHILS 0 0 - 7 %    BASOPHILS 0 0 - 1 %    IMMATURE GRANULOCYTES 0 %    ABS. NEUTROPHILS 8.2 (H) 1.8 - 8.0 K/UL    ABS. LYMPHOCYTES 1.4 0.8 - 3.5 K/UL    ABS. MONOCYTES 0.3 0.0 - 1.0 K/UL    ABS. EOSINOPHILS 0.0 0.0 - 0.4 K/UL    ABS. BASOPHILS 0.0 0.0 - 0.1 K/UL    ABS. IMM.  GRANS. 0.0 K/UL    RBC COMMENTS OVALOCYTES  PRESENT        RBC COMMENTS TEARDROP CELLS  PRESENT        RBC COMMENTS TARGET CELLS  PRESENT        RBC COMMENTS HYPOCHROMIA  2+        RBC COMMENTS ANISOCYTOSIS  1+       GLUCOSE, POC    Collection Time: 03/14/18  7:03 AM   Result Value Ref Range    Glucose (POC) 80 65 - 100 mg/dL    Performed by Nohemy Bella    GLUCOSE, POC    Collection Time: 03/14/18  4:04 PM   Result Value Ref Range    Glucose (POC) 92 65 - 100 mg/dL    Performed by Glen Garcia          Assessment:     Hospital Problems  Date Reviewed: 3/11/2018          Codes Class Noted POA    * (Principal)Peritonitis ([facility] ICD-10-CM: J95.8  ICD-9-CM: 567.9  3/11/2018 Yes Plan/Recommendations/Medical Decision Making:     - SBP:  No acute indication for operation.   Continue catheter to gravity drain  - Diet as tolerated  - Antibiotic per ID    Signed By: June Gold MD     March 14, 2018

## 2018-03-14 NOTE — PROGRESS NOTES
Rhett Kim  is a 29 y. o.female  with a history of SLE, lupus nephritis leading to ESRD //HTN/ treated for candida peritonitis in Dec 2017  Is admitted with abdominal pain and fever since last Thursday  Pt was in [facility] in Dec 2017 for candida peritonitis and PD was stopped and she was started on HD thru a catheter. She took 4 weeks of fluconazole and was doing fine She had a graft placed on the rt arm on 1/19/19 - She developed fever and abdominal pain on 3/6 and blood culture sent from the catheter at dialysis center- The catheter was removed on 3/7 and the graft was accessed last week-   She was given IV gent  at the dialysis center.  As the abdominal pain was getting worse she came to the ED on 3/11 and was admitted  CT abdomen showed Large amount of free intraperitoneal fluid that is  loculated.         Multiple hospitalizations in the past few months  OCt 14-17 /2017 for left lower lobe pneumonia/effusion- was found to have PE on the rt side  10/27-10/30 possible pneumonia- same infiltrate left lower lobe- sent on augmentin     11/26-/11/28 for b/l leg swelling and abdominal swelling     12/12/17-12/22/17- fungal peritonitis- treated with fluconazole for 4 weeks     She had  peritoneal dialysis since 2015- had cath placed in 2015 until it was removed in Dec 2017      subjective  Feeling better  Had IR place a drain   Has thick fluid int he bag  Objective:   VITALS:    Patient Vitals for the past 12 hrs:   Temp Pulse Resp BP SpO2   03/14/18 1737 98.1 °F (36.7 °C) 94 20 120/72 95 %   03/14/18 1430 - (!) 105 - 111/76 -   03/14/18 1329 - (!) 107 - 117/77 -   03/14/18 1315 - (!) 110 20 111/78 -   03/14/18 1305 98 °F (36.7 °C) (!) 109 20 112/76 -   03/14/18 1227 - (!) 111 22 118/75 96 %   03/14/18 1217 - (!) 111 23 118/71 96 %   03/14/18 1213 - (!) 119 27 124/68 96 %   03/14/18 1206 - (!) 121 19 126/81 95 %   03/14/18 1106 - (!) 115 18 124/85 100 %   03/14/18 0819 - - - - 97 %   03/14/18 0706 97.8 °F (36.6 °C) 82 18 119/87 97 %   PHYSICAL EXAM:   General:                    Alert, cooperative,  .   Chronically ill ,weak  Lungs:                       B/l air entry  crepts bases     Heart:                                  s1s2  Abdomen:                  Drain   Less tender  Extremities:               Rt arm graft   Skin:                                    Dry skin- striae over arms/abdomen  Lymph:                      Cervical, supraclavicular normal.  Neurologic:                Grossly non-focal     Pertinent Labs  Wbc 2.5>6.5   Hb 8.4    Lactate 1.5  Albumin 1.5  Peritoneal fluid- 3524 WBC ( 85% N)  Gram neg delfina     IMAGING RESULTS:       Impression/Recommendation  33 yr female with SLE/ lupus nephritis/HTN -h/o peritoneal dialysis until Dec 2017 /Candida peritonitis in Dec 2017/ treated with 4 weeks of fluconazole     Admitted with abdominal pain and fever of  5 days     E.coli  peritonitis with e.coli bacteremia  Because the fluid is loculated it will have to be drained completely- as this is more like abscess- seen by Surgery and asked IR to drain the fluid  On zosyn   The susceptibility  profile  of the e.coli in blood is different from peritoneal fluid-The blood e.coli is resistant to levaquin  Continue zosyn currently- will switch to cefepime on discharge     Thrombocytopenia-stable --if it worsens will Dc zosyn and change to cefepime     Recently treated candida peritonitis in Dec 2017- that time she was on PD     SLE-on imuran/plaquenil and prednisone     HTN-now has hypotension     PE- on elaquis     Discussed with patient and nephrologist

## 2018-03-14 NOTE — PROCEDURES
[facility]  [phone]    Vitals   Pre   Post   Assessment   Pre   Post     Temp  Temp: 97 °F (36.1 °C) (03/13/18 2227)  97.5 LOC  Alert, oriented Restless at times during treatment   HR   Pulse (Heart Rate): 95 (03/13/18 2300) 106 Lungs   Clear diminished.  Om room air  placed on o22L for c/o SOB   B/P   BP: (!) 149/114 (03/13/18 2300) 103/82 Cardiac   HRR, 100-110, afebrile, b/p stable  no  change   Resp   Resp Rate: 20 (03/13/18 2300) 20 Skin   Intact, dry  no change   Pain level  Pain Intensity 1: 6 (03/13/18 1625) 6 Edema  Abdomen distended, +3 pitting, BLE's     No change   Orders:    Duration:   Start:    2222 End:    0136 Total:   3 hrs   Dialyzer:   Dialyzer/Set Up Inspection: Revaclear (03/13/18 2227)   K Bath:   Dialysate K (mEq/L): 3 (03/13/18 2227)   Ca Bath:   Dialysate CA (mEq/L): 2.5 (03/13/18 2227)   Na/Bicarb:   Dialysate NA (mEq/L): 140 (03/13/18 2227)   Target Fluid Removal:   Goal/Amount of Fluid to Remove (mL): 1500 mL (03/13/18 2227)   Access     Type & Location:   r av   Labs     Obtained/Reviewed   Critical Results Called   Date when labs were drawn-  Hgb-    HGB   Date Value Ref Range Status   03/13/2018 8.4 (L) 11.5 - 16.0 g/dL Final     K-    Potassium   Date Value Ref Range Status   03/13/2018 4.3 3.5 - 5.1 mmol/L Final     Ca-   Calcium   Date Value Ref Range Status   03/13/2018 8.6 8.5 - 10.1 MG/DL Final     Bun-   BUN   Date Value Ref Range Status   03/13/2018 31 (H) 6 - 20 MG/DL Final     Creat-   Creatinine   Date Value Ref Range Status   03/13/2018 4.92 (H) 0.55 - 1.02 MG/DL Final     Comment:     INVESTIGATED PER DELTA CHECK PROTOCOL        Medications/ Blood Products Given     Name   Dose   Route and Time                     Blood Volume Processed (BVP):    60.7 Net Fluid   Removed:  1.1L   Comments   Time Out Done: 0218  Primary Nurse Rpt Pre:PARADISE Russell RN  Primary Nurse Rpt Post:PARADISE Russell RN  Pt [address] [facility] treatment team  Tx Summary:2215: treatment delay due bleaching and rinsing machine between pts. 2215: pt. Assessed, site assessed. +b/t at site. Skin prepped and difficulty with cannulating art. Site. Unable to thread or aspirate, required 2nd stick which was successful. blood cx obtained as ordered. Treatment started. .  0000: pt. Restless and at times anxious at times trying to sit on side of bed. C/o cramping in sides, noted trending down of b/p. Goal decreased. Remaining tachy in 100's. Medicated for pain by primary RN. 0230: treatment completed. Pt.  rinsedback and bleeding stopped x 25 min. Site remains intact. DSD applied. Report given to primary RN    Admiting Diagnosis:peritonitis  Pt's previous clinic-[facility]  Consent signed - Informed Consent Verified: Yes (03/13/18 8847)  [facility] Consent - yes  Hepatitis Status- hep abs 10 1/11/18  Machine #- Machine Number: [id number] (03/13/18 2227)  Telemetry status-  Pre-dialysis wt. - Pre-Dialysis Weight: 66.4 kg (146 lb 6.2 oz) (03/13/18 2227)

## 2018-03-14 NOTE — PROGRESS NOTES
Notified Dr. Derick Szymanski of amount of drainage from paracentesis drain order to clamp until tomorrow am. Total amount out 3800cc.

## 2018-03-15 LAB
ALBUMIN SERPL-MCNC: 1.1 G/DL (ref 3.5–5)
ALBUMIN/GLOB SERPL: 0.2 {RATIO} (ref 1.1–2.2)
ALP SERPL-CCNC: 61 U/L (ref 45–117)
ALT SERPL-CCNC: <6 U/L (ref 12–78)
ANION GAP SERPL CALC-SCNC: 9 MMOL/L (ref 5–15)
ANION GAP SERPL CALC-SCNC: 9 MMOL/L (ref 5–15)
AST SERPL-CCNC: 20 U/L (ref 15–37)
BASOPHILS # BLD: 0 K/UL (ref 0–0.1)
BASOPHILS NFR BLD: 0 % (ref 0–1)
BILIRUB SERPL-MCNC: 0.4 MG/DL (ref 0.2–1)
BUN SERPL-MCNC: 31 MG/DL (ref 6–20)
BUN SERPL-MCNC: 31 MG/DL (ref 6–20)
BUN/CREAT SERPL: 7 (ref 12–20)
BUN/CREAT SERPL: 7 (ref 12–20)
CALCIUM SERPL-MCNC: 7.8 MG/DL (ref 8.5–10.1)
CALCIUM SERPL-MCNC: 8 MG/DL (ref 8.5–10.1)
CHLORIDE SERPL-SCNC: 98 MMOL/L (ref 97–108)
CHLORIDE SERPL-SCNC: 98 MMOL/L (ref 97–108)
CO2 SERPL-SCNC: 30 MMOL/L (ref 21–32)
CO2 SERPL-SCNC: 31 MMOL/L (ref 21–32)
CREAT SERPL-MCNC: 4.3 MG/DL (ref 0.55–1.02)
CREAT SERPL-MCNC: 4.34 MG/DL (ref 0.55–1.02)
DIFFERENTIAL METHOD BLD: ABNORMAL
EOSINOPHIL # BLD: 0 K/UL (ref 0–0.4)
EOSINOPHIL NFR BLD: 0 % (ref 0–7)
ERYTHROCYTE [DISTWIDTH] IN BLOOD BY AUTOMATED COUNT: 18.6 % (ref 11.5–14.5)
FERRITIN SERPL-MCNC: 548 NG/ML (ref 8–252)
FOLATE SERPL-MCNC: 4.1 NG/ML (ref 5–21)
GLOBULIN SER CALC-MCNC: 4.6 G/DL (ref 2–4)
GLUCOSE BLD STRIP.AUTO-MCNC: 76 MG/DL (ref 65–100)
GLUCOSE BLD STRIP.AUTO-MCNC: 77 MG/DL (ref 65–100)
GLUCOSE BLD STRIP.AUTO-MCNC: 96 MG/DL (ref 65–100)
GLUCOSE BLD STRIP.AUTO-MCNC: 99 MG/DL (ref 65–100)
GLUCOSE BLD STRIP.AUTO-MCNC: 99 MG/DL (ref 65–100)
GLUCOSE SERPL-MCNC: 81 MG/DL (ref 65–100)
GLUCOSE SERPL-MCNC: 87 MG/DL (ref 65–100)
HAPTOGLOB SERPL-MCNC: 225 MG/DL (ref 30–200)
HCT VFR BLD AUTO: 23.3 % (ref 35–47)
HGB BLD-MCNC: 7.1 G/DL (ref 11.5–16)
IMM GRANULOCYTES # BLD: 0 K/UL
IMM GRANULOCYTES NFR BLD AUTO: 0 %
IRON SATN MFR SERPL: 42 % (ref 20–50)
IRON SERPL-MCNC: 22 UG/DL (ref 35–150)
LYMPHOCYTES # BLD: 0.4 K/UL (ref 0.8–3.5)
LYMPHOCYTES NFR BLD: 6 % (ref 12–49)
MAGNESIUM SERPL-MCNC: 1.8 MG/DL (ref 1.6–2.4)
MCH RBC QN AUTO: 25.2 PG (ref 26–34)
MCHC RBC AUTO-ENTMCNC: 30.5 G/DL (ref 30–36.5)
MCV RBC AUTO: 82.6 FL (ref 80–99)
MONOCYTES # BLD: 0.2 K/UL (ref 0–1)
MONOCYTES NFR BLD: 3 % (ref 5–13)
NEUTS BAND NFR BLD MANUAL: 2 % (ref 0–6)
NEUTS SEG # BLD: 6.2 K/UL (ref 1.8–8)
NEUTS SEG NFR BLD: 89 % (ref 32–75)
NRBC # BLD: 0 K/UL (ref 0–0.01)
NRBC BLD-RTO: 0 PER 100 WBC
PERIPHERAL SMEAR,PSM: NORMAL
PHOSPHATE SERPL-MCNC: 3 MG/DL (ref 2.6–4.7)
PLATELET # BLD AUTO: 95 K/UL (ref 150–400)
PLATELET COMMENTS,PCOM: ABNORMAL
PMV BLD AUTO: 13 FL (ref 8.9–12.9)
POTASSIUM SERPL-SCNC: 3.2 MMOL/L (ref 3.5–5.1)
POTASSIUM SERPL-SCNC: 3.2 MMOL/L (ref 3.5–5.1)
PROT SERPL-MCNC: 5.7 G/DL (ref 6.4–8.2)
RBC # BLD AUTO: 2.82 M/UL (ref 3.8–5.2)
RBC MORPH BLD: ABNORMAL
RETICS # AUTO: 0.02 M/UL (ref 0.02–0.08)
RETICS/RBC NFR AUTO: 0.7 % (ref 0.7–2.1)
SERVICE CMNT-IMP: NORMAL
SODIUM SERPL-SCNC: 137 MMOL/L (ref 136–145)
SODIUM SERPL-SCNC: 138 MMOL/L (ref 136–145)
TIBC SERPL-MCNC: 52 UG/DL (ref 250–450)
WBC # BLD AUTO: 6.8 K/UL (ref 3.6–11)

## 2018-03-15 PROCEDURE — 90935 HEMODIALYSIS ONE EVALUATION: CPT

## 2018-03-15 PROCEDURE — 83010 ASSAY OF HAPTOGLOBIN QUANT: CPT | Performed by: NURSE PRACTITIONER

## 2018-03-15 PROCEDURE — 82728 ASSAY OF FERRITIN: CPT | Performed by: NURSE PRACTITIONER

## 2018-03-15 PROCEDURE — 74011250637 HC RX REV CODE- 250/637: Performed by: HOSPITALIST

## 2018-03-15 PROCEDURE — P9016 RBC LEUKOCYTES REDUCED: HCPCS | Performed by: INTERNAL MEDICINE

## 2018-03-15 PROCEDURE — 30233N1 TRANSFUSION OF NONAUTOLOGOUS RED BLOOD CELLS INTO PERIPHERAL VEIN, PERCUTANEOUS APPROACH: ICD-10-PCS | Performed by: HOSPITALIST

## 2018-03-15 PROCEDURE — 74011000258 HC RX REV CODE- 258: Performed by: HOSPITALIST

## 2018-03-15 PROCEDURE — 84100 ASSAY OF PHOSPHORUS: CPT | Performed by: INTERNAL MEDICINE

## 2018-03-15 PROCEDURE — 65660000000 HC RM CCU STEPDOWN

## 2018-03-15 PROCEDURE — 74011636637 HC RX REV CODE- 636/637: Performed by: HOSPITALIST

## 2018-03-15 PROCEDURE — 85025 COMPLETE CBC W/AUTO DIFF WBC: CPT | Performed by: HOSPITALIST

## 2018-03-15 PROCEDURE — 86900 BLOOD TYPING SEROLOGIC ABO: CPT | Performed by: INTERNAL MEDICINE

## 2018-03-15 PROCEDURE — 74011250636 HC RX REV CODE- 250/636: Performed by: HOSPITALIST

## 2018-03-15 PROCEDURE — 83540 ASSAY OF IRON: CPT | Performed by: NURSE PRACTITIONER

## 2018-03-15 PROCEDURE — 80048 BASIC METABOLIC PNL TOTAL CA: CPT | Performed by: HOSPITALIST

## 2018-03-15 PROCEDURE — 82746 ASSAY OF FOLIC ACID SERUM: CPT | Performed by: NURSE PRACTITIONER

## 2018-03-15 PROCEDURE — 74011250636 HC RX REV CODE- 250/636: Performed by: INTERNAL MEDICINE

## 2018-03-15 PROCEDURE — 82962 GLUCOSE BLOOD TEST: CPT

## 2018-03-15 PROCEDURE — 74011000250 HC RX REV CODE- 250: Performed by: HOSPITALIST

## 2018-03-15 PROCEDURE — 83735 ASSAY OF MAGNESIUM: CPT | Performed by: INTERNAL MEDICINE

## 2018-03-15 PROCEDURE — 80053 COMPREHEN METABOLIC PANEL: CPT | Performed by: INTERNAL MEDICINE

## 2018-03-15 PROCEDURE — 85045 AUTOMATED RETICULOCYTE COUNT: CPT | Performed by: NURSE PRACTITIONER

## 2018-03-15 PROCEDURE — 36415 COLL VENOUS BLD VENIPUNCTURE: CPT | Performed by: HOSPITALIST

## 2018-03-15 PROCEDURE — 94640 AIRWAY INHALATION TREATMENT: CPT

## 2018-03-15 PROCEDURE — 86923 COMPATIBILITY TEST ELECTRIC: CPT | Performed by: INTERNAL MEDICINE

## 2018-03-15 RX ORDER — SODIUM CHLORIDE 9 MG/ML
250 INJECTION, SOLUTION INTRAVENOUS AS NEEDED
Status: DISCONTINUED | OUTPATIENT
Start: 2018-03-15 | End: 2018-04-20 | Stop reason: HOSPADM

## 2018-03-15 RX ADMIN — PREDNISONE 5 MG: 5 TABLET ORAL at 08:42

## 2018-03-15 RX ADMIN — Medication 2500 MCG: at 08:41

## 2018-03-15 RX ADMIN — Medication 10 ML: at 22:00

## 2018-03-15 RX ADMIN — PANTOPRAZOLE SODIUM 40 MG: 40 TABLET, DELAYED RELEASE ORAL at 07:22

## 2018-03-15 RX ADMIN — ARFORMOTEROL TARTRATE 15 MCG: 15 SOLUTION RESPIRATORY (INHALATION) at 09:47

## 2018-03-15 RX ADMIN — PIPERACILLIN SODIUM,TAZOBACTAM SODIUM 3.38 G: 3; .375 INJECTION, POWDER, FOR SOLUTION INTRAVENOUS at 04:52

## 2018-03-15 RX ADMIN — APIXABAN 5 MG: 5 TABLET, FILM COATED ORAL at 17:22

## 2018-03-15 RX ADMIN — ERYTHROPOIETIN 14000 UNITS: 10000 INJECTION, SOLUTION INTRAVENOUS; SUBCUTANEOUS at 21:25

## 2018-03-15 RX ADMIN — ARFORMOTEROL TARTRATE 15 MCG: 15 SOLUTION RESPIRATORY (INHALATION) at 22:35

## 2018-03-15 RX ADMIN — HYDROMORPHONE HYDROCHLORIDE 0.5 MG: 2 INJECTION INTRAMUSCULAR; INTRAVENOUS; SUBCUTANEOUS at 21:24

## 2018-03-15 RX ADMIN — BUDESONIDE 500 MCG: 0.5 INHALANT RESPIRATORY (INHALATION) at 09:47

## 2018-03-15 RX ADMIN — OXYCODONE HYDROCHLORIDE 5 MG: 5 TABLET ORAL at 17:22

## 2018-03-15 RX ADMIN — HYDROXYCHLOROQUINE SULFATE 200 MG: 200 TABLET, FILM COATED ORAL at 08:42

## 2018-03-15 RX ADMIN — AMITRIPTYLINE HYDROCHLORIDE 25 MG: 10 TABLET, FILM COATED ORAL at 21:24

## 2018-03-15 RX ADMIN — ALLOPURINOL 100 MG: 100 TABLET ORAL at 10:00

## 2018-03-15 RX ADMIN — BUDESONIDE 500 MCG: 0.5 INHALANT RESPIRATORY (INHALATION) at 22:35

## 2018-03-15 RX ADMIN — APIXABAN 5 MG: 5 TABLET, FILM COATED ORAL at 10:00

## 2018-03-15 RX ADMIN — Medication 10 ML: at 07:24

## 2018-03-15 RX ADMIN — PIPERACILLIN SODIUM,TAZOBACTAM SODIUM 3.38 G: 3; .375 INJECTION, POWDER, FOR SOLUTION INTRAVENOUS at 17:23

## 2018-03-15 RX ADMIN — HYDROMORPHONE HYDROCHLORIDE 0.5 MG: 2 INJECTION INTRAMUSCULAR; INTRAVENOUS; SUBCUTANEOUS at 07:24

## 2018-03-15 RX ADMIN — GEMFIBROZIL 300 MG: 600 TABLET ORAL at 08:41

## 2018-03-15 RX ADMIN — AZATHIOPRINE 50 MG: 50 TABLET ORAL at 10:00

## 2018-03-15 RX ADMIN — HYDROXYCHLOROQUINE SULFATE 200 MG: 200 TABLET, FILM COATED ORAL at 17:22

## 2018-03-15 NOTE — PROGRESS NOTES
Richy Prieto  is a 29 y. o.female  with a history of SLE, lupus nephritis leading to ESRD //HTN/ treated for candida peritonitis in Dec 2017  Is admitted with abdominal pain and fever since last Thursday  Pt was in [facility] in Dec 2017 for candida peritonitis and PD was stopped and she was started on HD thru a catheter. She took 4 weeks of fluconazole and was doing fine She had a graft placed on the rt arm on 1/19/19 - She developed fever and abdominal pain on 3/6 and blood culture sent from the catheter at dialysis center- The catheter was removed on 3/7 and the graft was accessed last week-   She was given IV gent  at the dialysis center.  As the abdominal pain was getting worse she came to the ED on 3/11 and was admitted  CT abdomen showed Large amount of free intraperitoneal fluid that is  loculated.         Multiple hospitalizations in the past few months  OCt 14-17 /2017 for left lower lobe pneumonia/effusion- was found to have PE on the rt side  10/27-10/30 possible pneumonia- same infiltrate left lower lobe- sent on augmentin     11/26-/11/28 for b/l leg swelling and abdominal swelling     12/12/17-12/22/17- fungal peritonitis- treated with fluconazole for 4 weeks     She had  peritoneal dialysis since 2015- had cath placed in 2015 until it was removed in Dec 2017      subjective  Feeling better  Had IR place a drain   Has thick fluid int he bag  Objective:   VITALS:    Patient Vitals for the past 12 hrs:   Temp Pulse Resp BP SpO2   03/15/18 1718 98.8 °F (37.1 °C) 93 20 (!) 137/99 94 %   03/15/18 1442 97.9 °F (36.6 °C) 97 20 (!) 148/105 -   03/15/18 1430 97 °F (36.1 °C) 91 20 (!) 144/107 -   03/15/18 1400 - 94 20 (!) 139/101 -   03/15/18 1330 98.1 °F (36.7 °C) 92 20 (!) 125/92 -   03/15/18 1300 - 88 20 (!) 126/97 -   03/15/18 1230 - 90 20 (!) 130/97 -   03/15/18 1200 - 92 20 (!) 128/100 -   03/15/18 1124 99 °F (37.2 °C) 92 16 (!) 124/92 -   03/15/18 0948 - - - - 97 %   03/15/18 0704 97.8 °F (36.6 °C) 86 18 128/85 97 %   PHYSICAL EXAM:   General:                    Alert, cooperative,  .   Chronically ill ,weak  Lungs:                       B/l air entry  crepts bases     Heart:                                  s1s2  Abdomen:                  Drain   Less tender  Extremities:               Rt arm graft   Skin:                                    Dry skin- striae over arms/abdomen  Lymph:                      Cervical, supraclavicular normal.  Neurologic:                Grossly non-focal     Pertinent Labs  Wbc 2.5>6.5   Hb 8.4    Lactate 1.5  Albumin 1.5  Peritoneal fluid- 3524 WBC ( 85% N)  Gram neg delfina     IMAGING RESULTS:       Impression/Recommendation  33 yr female with SLE/ lupus nephritis/HTN -h/o peritoneal dialysis until Dec 2017 /Candida peritonitis in Dec 2017/ treated with 4 weeks of fluconazole     Admitted with abdominal pain and fever of  5 days     E.coli  peritonitis with e.coli bacteremia  Because the fluid is loculated it will have to be drained completely- as this is more like abscess-  IR placed the drain- will need repeat CT/imaging to make sure all the loculated fluid collections are drained completely  On zosyn   The susceptibility  profile  of the e.coli in blood is different from peritoneal fluid-The blood e.coli is resistant to levaquin  Continue zosyn currently- will switch to cefepime on discharge     Thrombocytopenia-stable --if it worsens will Dc zosyn and change to cefepime     Recently treated candida peritonitis in Dec 2017- that time she was on PD     SLE-on imuran/plaquenil and prednisone     HTN-now has hypotension        Discussed with patient

## 2018-03-15 NOTE — PROCEDURES
[facility]  [phone]    Vitals   Pre   Post   Assessment   Pre   Post     Temp  Temp: 99 °F (37.2 °C) (03/15/18 1124)  97.9   LOC  Alert, oriented No change   HR   Pulse (Heart Rate): 92 (03/15/18 1124) 97 Lungs   Clear, diminished  no change   B/P   BP: (!) 124/92 (03/15/18 1124) 148/105 Cardiac   HRR, 90's, b/p stable  no change   Resp   Resp Rate: 16 (03/15/18 1124) 20 Skin   Very dry, intact, s/p paracentesis,r drainage bag intact  no change   Pain level  Pain Intensity 1: 4 (03/14/18 2354) 5 Edema  con't but improved, abdomen slightly distend, +2 edema BLE's nonpitting     No change   Orders:    Duration:   Start:    1124 Procedure Start Time: 8589 End:    3566 Total:   3.25 hrs   Dialyzer:   Dialyzer/Set Up Inspection: Revaclear (03/15/18 1124)   K Bath:   Dialysate K (mEq/L): 3 (03/15/18 1124)   Ca Bath:   Dialysate CA (mEq/L): 2.5 (03/15/18 1124)   Na/Bicarb:   Dialysate NA (mEq/L): 140 (03/15/18 1124)   Target Fluid Removal:   Goal/Amount of Fluid to Remove (mL): 1500 mL (03/15/18 1124)   Access     Type & Location:   r upper av   Labs     Obtained/Reviewed   Critical Results Called   Date when labs were drawn-  Hgb-    HGB   Date Value Ref Range Status   03/15/2018 7.1 (L) 11.5 - 16.0 g/dL Final     K-    Potassium   Date Value Ref Range Status   03/15/2018 3.2 (L) 3.5 - 5.1 mmol/L Final   03/15/2018 3.2 (L) 3.5 - 5.1 mmol/L Final     Ca-   Calcium   Date Value Ref Range Status   03/15/2018 8.0 (L) 8.5 - 10.1 MG/DL Final   03/15/2018 7.8 (L) 8.5 - 10.1 MG/DL Final     Bun-   BUN   Date Value Ref Range Status   03/15/2018 31 (H) 6 - 20 MG/DL Final   03/15/2018 31 (H) 6 - 20 MG/DL Final     Creat-   Creatinine   Date Value Ref Range Status   03/15/2018 4.30 (H) 0.55 - 1.02 MG/DL Final     Comment:     INVESTIGATED PER DELTA CHECK PROTOCOL   03/15/2018 4.34 (H) 0.55 - 1.02 MG/DL Final        Medications/ Blood Products Given     Name   Dose   Route and Time     Packed rbc's  2 units [id number]             Blood Volume Processed (BVP):    70.3 Net Fluid   Removed:  1.444 L   Comments   Time Out Done: 2118  Primary Nurse Rpt Yg Cash RN  Primary Nurse Rpt Yobani Cash RN  Pt 1338 epicurio Plan:per treatment team  Tx Summary:. 1124: pt. Assessed; access intact no s/s infection, +b/+t. No c/o pain @ site, no s/s infection. Skin prepped and pt. Cannulated with #15g needles w/o difficulty. Treatment started as ordered. 1500: treatment complete. tolerated transfusion w/o problem. No s/s transfusion reaction voiced or observed. VSS throughout treatment. Pt. Rinsed back, needles removed and bleeding stopped x25 min. No change in site post treatment. DSD to site. Report given to primary RN  Admiting Diagnosis:  Pt's previous clinic-  Consent signed - Informed Consent Verified: Yes (03/15/18 1124)  [facility] Consent - yes  Hepatitis Status- hep abs 10; 1/13/18  Machine #- Machine Number: [id number] (03/15/18 1124)  Telemetry status-  Pre-dialysis wt. - Pre-Dialysis Weight: 68.9 kg (151 lb 14.4 oz) (03/15/18 1124)

## 2018-03-15 NOTE — CONSULTS
[facility] [place] [address], [address] [place]: [phone]  F: [phone]    Reason for Visit:   Rhett Kim is a 29 y.o. female who is seen in consultation at the request of Dr. Renetta Tracy for evaluation of history of PE. Treatment History:   · Coumadin started 4/2013 for PE  · 10/2017 VQ scan showed high probability of PE, patient stopped Coumadin and was started on Eliquis     History of Present Illness:   Patient has a PMH of SLE, end-stage renal disease (on hemodialysis, TTS), pulmonary embolism, on Eliquis (2012), hyperlipidemia, hypertension, recent Candida peritonitis, chronic bilateral pain, admitted with abdominal pain. CT of the abdomen showed large amount of free intraperitoneal fluid that is loculated. She is now followed by ID for E. Coli peritonitis with E. Coli bacteremia. She is s/p US guided paracentesis today and she remains on Zosyn. Ms. Yuliya Montelongo has a history of PE initially diagnosed in 4/2013 and she was placed on Coumadin at that time. She saw Dr. Agnes Stoner in the outpatient clinic in 2014 and coumadin was continued due to concern for high risk of clot development with uncontrolled lupus. She continued on Coumadin until 10/2017 when VQ scan showed high probability of PE during a recent hospitalization. She was switched to Eliquis at that time. She reports she has tolerated Eliquis well and has had no clots develop while taking Eliquis. Interval history: Ms. Dwyer Case reports she feels much better this morning. Abdominal pain and swelling have improved. She denies fevers. Right abdominal drain continues to drain brown/clear liquid.      Past Medical History:   Diagnosis Date    Anemia     secondary to lupus    Asthma     no inhaler use in past 2 to 3 years    Carditis     Chronic kidney disease     ESRD    Chronic pain     DDD (degenerative disc disease), lumbar     ESRD (end stage renal disease) (HCC)     GERD (gastroesophageal reflux disease)     Heart failure ([facility]     Hemodialysis patient [facility]) 2017    [address]  Tuesday,  Thursday,  and Saturday.  Hypercholesterolemia     Hypertension     Intractable nausea and vomiting 10/21/2015    Long term (current) use of anticoagulants     Lupus     Lupus (systemic lupus erythematosus) (HCC)     Malignant hypertension with chronic kidney disease stage V ([facility] [facility]     Peritoneal dialysis status ([facility] [address] 10/2015    x 2 years Stopped 2017 due to infection and removed.  Poor historian 2018    With medications    Thromboembolus ([facility] [address] 2013    lungs    Transfusion history     Last Transfusion 2017  at [address] [place] [address]      Past Surgical History:   Procedure Laterality Date    HX  SECTION  11/2006    x1    HX OTHER SURGICAL  9/16/15    INSERTION PD CATH;  Removed 2017    HX VASCULAR ACCESS Right 2017    Double-Lumen henry catheter upper chest      Social History   Substance Use Topics    Smoking status: Never Smoker    Smokeless tobacco: Never Used    Alcohol use No      Family History   Problem Relation Age of Onset    Diabetes Father     Hypertension Father     Cancer Other      aunt with breast cancer    Diabetes Mother      Current Facility-Administered Medications   Medication Dose Route Frequency    apixaban (ELIQUIS) tablet 5 mg  5 mg Oral BID    0.9% sodium chloride infusion 250 mL  250 mL IntraVENous PRN    HYDROmorphone (DILAUDID) injection 0.5 mg  0.5 mg IntraVENous Q3H PRN    glucose chewable tablet 16 g  4 Tab Oral PRN    dextrose (D50W) injection syrg 12.5-25 g  12.5-25 g IntraVENous PRN    glucagon (GLUCAGEN) injection 1 mg  1 mg IntraMUSCular PRN    epoetin pia (EPOGEN;PROCRIT) 14,000 Units  14,000 Units SubCUTAneous DIALYSIS TUE, U & SAT    insulin lispro (HUMALOG) injection   SubCUTAneous AC&HS    predniSONE (DELTASONE) tablet 5 mg  5 mg Oral DAILY    pantoprazole (PROTONIX) tablet 40 mg  40 mg Oral ACB    allopurinol (ZYLOPRIM) tablet 100 mg  100 mg Oral DAILY AFTER BREAKFAST    cyanocobalamin (VITAMIN B12) tablet 2,500 mcg  2,500 mcg Oral DAILY    hydroxychloroquine (PLAQUENIL) tablet 200 mg  200 mg Oral BID    albuterol (PROVENTIL VENTOLIN) nebulizer solution 2.5 mg  2.5 mg Nebulization Q4H PRN    azaTHIOprine (IMURAN) tablet 50 mg  50 mg Oral DAILY AFTER BREAKFAST    gemfibrozil (LOPID) tablet 300 mg  300 mg Oral DAILY    oxyCODONE IR (ROXICODONE) tablet 2.5-5 mg  2.5-5 mg Oral Q6H PRN    amitriptyline (ELAVIL) tablet 25 mg  25 mg Oral QHS    sodium chloride (NS) flush 5-10 mL  5-10 mL IntraVENous Q8H    sodium chloride (NS) flush 5-10 mL  5-10 mL IntraVENous PRN    ondansetron (ZOFRAN) injection 4 mg  4 mg IntraVENous Q4H PRN    piperacillin-tazobactam (ZOSYN) 3.375 g in 0.9% sodium chloride (MBP/ADV) 100 mL  3.375 g IntraVENous Q12H    arformoterol (BROVANA) neb solution 15 mcg  15 mcg Nebulization BID RT    And    budesonide (PULMICORT) 500 mcg/2 ml nebulizer suspension  500 mcg Nebulization BID RT      Allergies   Allergen Reactions    Compazine [Prochlorperazine Edisylate] Nausea and Vomiting and Palpitations        Review of Systems: A complete review of systems was obtained, negative except as described above. Physical Exam:     Visit Vitals    BP (!) 124/92    Pulse 92    Temp 99 °F (37.2 °C) (Oral)    Resp 16    Ht 5' 5\" (1.651 m)    Wt 151 lb 14.4 oz (68.9 kg)    SpO2 97%    BMI 25.28 kg/m2     ECOG PS: 3  General: No distress  Eyes:  anicteric sclerae  HENT: Atraumatic, OP clear  Neck: Supple  Respiratory: CTAB, normal respiratory effort  CV: Normal rate, regular rhythm, bilateral lower extremity edema noted  GI: Distended, slight tenderness over right upper abdomen. Drain noted right lower abdomen, dressing intact. MS: Gait no observed. Skin: No rashes, ecchymoses, or petechiae.    Psych: Alert, oriented    Results:     Lab Results   Component Value Date/Time WBC 6.8 03/15/2018 04:56 AM    HGB 7.1 (L) 03/15/2018 04:56 AM    HCT 23.3 (L) 03/15/2018 04:56 AM    PLATELET 95 (L) 82/44/3354 04:56 AM    MCV 82.6 03/15/2018 04:56 AM    ABS. NEUTROPHILS 6.2 03/15/2018 04:56 AM    Hemoglobin (POC) 7.8 (L) 01/19/2018 12:30 PM    Hematocrit (POC) 23 (L) 01/19/2018 12:30 PM     Lab Results   Component Value Date/Time    Sodium 138 03/15/2018 04:56 AM    Sodium 137 03/15/2018 04:56 AM    Potassium 3.2 (L) 03/15/2018 04:56 AM    Potassium 3.2 (L) 03/15/2018 04:56 AM    Chloride 98 03/15/2018 04:56 AM    Chloride 98 03/15/2018 04:56 AM    CO2 31 03/15/2018 04:56 AM    CO2 30 03/15/2018 04:56 AM    Glucose 87 03/15/2018 04:56 AM    Glucose 81 03/15/2018 04:56 AM    BUN 31 (H) 03/15/2018 04:56 AM    BUN 31 (H) 03/15/2018 04:56 AM    Creatinine 4.30 (H) 03/15/2018 04:56 AM    Creatinine 4.34 (H) 03/15/2018 04:56 AM    GFR est AA 14 (L) 03/15/2018 04:56 AM    GFR est AA 14 (L) 03/15/2018 04:56 AM    GFR est non-AA 12 (L) 03/15/2018 04:56 AM    GFR est non-AA 12 (L) 03/15/2018 04:56 AM    Calcium 8.0 (L) 03/15/2018 04:56 AM    Calcium 7.8 (L) 03/15/2018 04:56 AM    Sodium (POC) 137 01/19/2018 12:30 PM    Potassium (POC) 4.4 01/19/2018 12:30 PM    Chloride (POC) 96 (L) 01/19/2018 12:30 PM    Glucose (POC) 99 03/15/2018 11:50 AM    BUN (POC) 24 (H) 01/19/2018 12:30 PM    Creatinine (POC) 5.5 (H) 01/19/2018 12:30 PM    Calcium, ionized (POC) 1.14 01/19/2018 12:30 PM     Lab Results   Component Value Date/Time    Bilirubin, total 0.4 03/15/2018 04:56 AM    ALT (SGPT) <6 (L) 03/15/2018 04:56 AM    AST (SGOT) 20 03/15/2018 04:56 AM    Alk. phosphatase 61 03/15/2018 04:56 AM    Protein, total 5.7 (L) 03/15/2018 04:56 AM    Albumin 1.1 (L) 03/15/2018 04:56 AM    Globulin 4.6 (H) 03/15/2018 04:56 AM     Imaging:  3/11/18 Doppler lower extremities: IMPRESSION:  No evidence of right or left lower extremity vein  thrombosis. Records reviewed and summarized above.   Radiology report(s) reviewed above.    Assessment:   1) History of PE. Per review of the medical record, she has had 1 known PE and a suspected 2nd PE. Patient was initially started on Coumadin in 4/2013 for PE. She continued on this and was switched to Eliquis in 10/2017 when VQ scan showed high probability of PE. Patient reports she tolerates Eliquis well and has had no bleeding on this. Patient remains at high risk for recurrent clot with frequent hospitalizations and lupus. For this reason, would recommend continuing patient on anticoagulation indefinitely with Eliquis as long as benefit outweighs risk. 2) Thrombocytopenia. Platelets 95 today, overall stable. Previously were normal prior to admission. Suspect thrombocytopenia is secondary to acute infection with peritonitis. Continue to monitor daily CBC's. 3) Anemia. History of vitamin B12 deficiency per Dr. Jessica Pham note, B12 level pending. Hemoglobin is down to 7.1 today. Will order additional labs for work-up including ferritin, iron profile, folate, reticulocyte count, peripheral smear, and haptoglobin. Patient denies bleeding. Anemia is most likely secondary to chronic disease and renal failure. 4) Peritonitis. Followed by ID, s/p paracentesis with drain placement yesterday. She remains on Zosyn. I appreciate the opportunity to participate in Ms. Delmis Enciso care. We will continue to follow along, please call with any questions.   Pt seen today in conjunction with NP Juan Diego Rojas.  Will discuss with hospitalist       Signed By: Brittany King, DO

## 2018-03-15 NOTE — PROGRESS NOTES
Patient name: Fareed Jacobson  MRN: [id number]    Nephrology Progress note:    Assessment:  ESRD: on HD TTS  E. Coli Bacteremia (Hard copy placed in chart): source intra-abd source/ E. Coli peritonitis. IV zosyn. Repeat Bld Cx 3/11-> Neg. 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 States she had a good night. No fevers/chills. Abd pain better. Drain still in place    ROS:   No nausea, no vomiting  No chest pain, no shortness of breath    Exam:  Visit Vitals    BP (!) 124/92    Pulse 92    Temp 99 °F (37.2 °C) (Oral)    Resp 16    Ht 5' 5\" (1.651 m)    Wt 68.9 kg (151 lb 14.4 oz)    SpO2 97%    BMI 25.28 kg/m2     Wt Readings from Last 3 Encounters:   03/15/18 68.9 kg (151 lb 14.4 oz)   03/05/18 68 kg (150 lb)   01/29/18 69.4 kg (153 lb)       Intake/Output Summary (Last 24 hours) at 03/15/18 1155  Last data filed at 03/14/18 1328   Gross per 24 hour   Intake               50 ml   Output             3800 ml   Net            -3750 ml       Gen: NAD/Frail appearance  HEENT: No icterus, mmm, no oral exudate, AT/NC  Lungs/Chest wall: Clear. No accessory muscle use  Cardiovascular: Regular rate, normal rhythm. Abdomen/: Soft, drain  Ext:  No peripheral edema. R AVF  CNS: alert and awake.  Answers appropriately      Current Facility-Administered Medications   Medication Dose Route Frequency Last Dose    apixaban (ELIQUIS) tablet 5 mg  5 mg Oral BID 5 mg at 03/15/18 1000    0.9% sodium chloride infusion 250 mL  250 mL IntraVENous PRN      HYDROmorphone (DILAUDID) injection 0.5 mg  0.5 mg IntraVENous Q3H PRN 0.5 mg at 03/15/18 0724    glucose chewable tablet 16 g  4 Tab Oral PRN      dextrose (D50W) injection syrg 12.5-25 g  12.5-25 g IntraVENous PRN      glucagon (GLUCAGEN) injection 1 mg  1 mg IntraMUSCular PRN      epoetin pia (EPOGEN;PROCRIT) 14,000 Units  14,000 Units SubCUTAneous DIALYSIS TUE, THU & SAT 14,000 Units at 03/14/18 0222    insulin lispro (HUMALOG) injection   SubCUTAneous AC&HS Stopped at 03/12/18 2200    predniSONE (DELTASONE) tablet 5 mg  5 mg Oral DAILY 5 mg at 03/15/18 0842    pantoprazole (PROTONIX) tablet 40 mg  40 mg Oral ACB 40 mg at 03/15/18 0722    allopurinol (ZYLOPRIM) tablet 100 mg  100 mg Oral DAILY AFTER BREAKFAST 100 mg at 03/15/18 1000    cyanocobalamin (VITAMIN B12) tablet 2,500 mcg  2,500 mcg Oral DAILY 2,500 mcg at 03/15/18 0841    hydroxychloroquine (PLAQUENIL) tablet 200 mg  200 mg Oral  mg at 03/15/18 0842    albuterol (PROVENTIL VENTOLIN) nebulizer solution 2.5 mg  2.5 mg Nebulization Q4H PRN      azaTHIOprine (IMURAN) tablet 50 mg  50 mg Oral DAILY AFTER BREAKFAST 50 mg at 03/15/18 1000    gemfibrozil (LOPID) tablet 300 mg  300 mg Oral DAILY 300 mg at 03/15/18 0841    oxyCODONE IR (ROXICODONE) tablet 2.5-5 mg  2.5-5 mg Oral Q6H PRN 5 mg at 03/14/18 0831    amitriptyline (ELAVIL) tablet 25 mg  25 mg Oral QHS 25 mg at 03/14/18 2142    sodium chloride (NS) flush 5-10 mL  5-10 mL IntraVENous Q8H 10 mL at 03/15/18 0724    sodium chloride (NS) flush 5-10 mL  5-10 mL IntraVENous PRN 10 mL at 03/13/18 2053    ondansetron (ZOFRAN) injection 4 mg  4 mg IntraVENous Q4H PRN      piperacillin-tazobactam (ZOSYN) 3.375 g in 0.9% sodium chloride (MBP/ADV) 100 mL  3.375 g IntraVENous Q12H 3.375 g at 03/15/18 0452    arformoterol (BROVANA) neb solution 15 mcg  15 mcg Nebulization BID RT 15 mcg at 03/15/18 0947    And    budesonide (PULMICORT) 500 mcg/2 ml nebulizer suspension  500 mcg Nebulization BID  mcg at 03/15/18 0947       Labs/Data:    Lab Results   Component Value Date/Time    WBC 6.8 03/15/2018 04:56 AM    Hemoglobin (POC) 7.8 (L) 01/19/2018 12:30 PM    HGB 7.1 (L) 03/15/2018 04:56 AM    Hematocrit (POC) 23 (L) 01/19/2018 12:30 PM    HCT 23.3 (L) 03/15/2018 04:56 AM    PLATELET 95 (L) 59/43/0309 04:56 AM    MCV 82.6 03/15/2018 04:56 AM       Lab Results   Component Value Date/Time    Sodium 138 03/15/2018 04:56 AM    Sodium 137 03/15/2018 04:56 AM    Potassium 3.2 (L) 03/15/2018 04:56 AM    Potassium 3.2 (L) 03/15/2018 04:56 AM    Chloride 98 03/15/2018 04:56 AM    Chloride 98 03/15/2018 04:56 AM    CO2 31 03/15/2018 04:56 AM    CO2 30 03/15/2018 04:56 AM    Anion gap 9 03/15/2018 04:56 AM    Anion gap 9 03/15/2018 04:56 AM    Glucose 87 03/15/2018 04:56 AM    Glucose 81 03/15/2018 04:56 AM    BUN 31 (H) 03/15/2018 04:56 AM    BUN 31 (H) 03/15/2018 04:56 AM    Creatinine 4.30 (H) 03/15/2018 04:56 AM    Creatinine 4.34 (H) 03/15/2018 04:56 AM    BUN/Creatinine ratio 7 (L) 03/15/2018 04:56 AM    BUN/Creatinine ratio 7 (L) 03/15/2018 04:56 AM    GFR est AA 14 (L) 03/15/2018 04:56 AM    GFR est AA 14 (L) 03/15/2018 04:56 AM    GFR est non-AA 12 (L) 03/15/2018 04:56 AM    GFR est non-AA 12 (L) 03/15/2018 04:56 AM    Calcium 8.0 (L) 03/15/2018 04:56 AM    Calcium 7.8 (L) 03/15/2018 04:56 AM       Patient seen and examined. Chart reviewed. Labs, data and other pertinent notes reviewed in last 24 hrs.     Discussed with patient and RN    Signed by:  Lori Armstrong MD

## 2018-03-15 NOTE — PROGRESS NOTES
Hospitalist Progress Note  Marjean Nissen, MD  Answering service: [phone] from in house phone  Cell: [phone]      Date of Service:  3/15/2018  NAME:  Benjamin Dear  :  1985  MRN:  [id number]      Admission Summary: This is a 69-year-old female with multiple medical problems including SLE, end-stage renal disease (on hemodialysis, TTS), pulmonary embolism, on Eliquis (), hyperlipidemia, hypertension, recent Candida peritonitis, chronic bilateral pain. She comes over here because of abdominal pain since last 4 or 5 days.     The patient claims that last Wednesday, that is about 4 days ago, the patient started having fever and at that time, she noted that her temperature was 100.4 degrees Fahrenheit. At the same time, she also started having abdominal pain. She describes her pain as throughout the whole abdomen, sharp 7/10 to 9/10 in intensity off and on, nonradiating, slightly gets better with pain medication, but there was no triggering factor whatsoever. In any case, the next day the patient went for dialysis. She claims that over there, they did the blood cultures and put her on antibiotics. She was told that in the blood culture, there was a growth of bacteria, but she does not remember the name of the bacteria. She also does not remember the name of the antibiotic as well. Please also note that recently the patient was switched to hemodialysis and subsequently received a graft. Last Wednesday, her dialysis catheter was taken out. When the patient's abdominal pain continued then today she decided to come into the hospital.  The patient claims that she did not have any other symptoms including chest pain, shortness of breath, cough, nausea, vomiting, headache, dizziness.   No changes in bowel movements.       Interval history / Subjective:     F/u Abdominal pain   Abdominal pain and SOB improved after paracentesis  Feeling better  Assessment & Plan:     Acute peritonitis  -recent candida peritonitis  -CT abd 3/11 Large amount of free intraperitoneal fluid appears somewhat loculated  -Follow cultures, blood culture 3/11 unremarkable. Peritoneal fluid heavy E coli  -On Vanc/Zosyn/Fluconazole. Now Vanc/FLuconazole discontinued. Continue Zosyn  -Appreciate Surgery, recommended US paracentesis  -s/p paracentesis, follow studies.   -The patient continues to have paracentesis fluid in the bag  -May need to do repeat Ct abd to make sure clearance  -Follow Surgery/ID    E coli bacteremia/Sepsis  -Spoke to Dr Bree Ortega who told me that the patient was found to have E coli bacteremia at HD    Hypotension  -now resolved  -Was on stress steroids  -Now on regular oral steroids    Thrombocytopenia   -Monitor closely  -Will switch Zosyn to cefepime if gets worse    Bilateral pedal edema  -Dopplers negative for DVT    SLE  -continue home meds    History of PE  -on Eliquis since 2012  -Appreciate discussion with PMD 3/13. The patient was seen by hematology in 2013 but seems was lost to follow up. I think at that time the plan was to stop anticoagulation if her SLE is stable. Spoke to Dr Harley Jacobson, he feels that the patient can come off Eliquis. His records do not suggest any recurrence of DVT/PE.  Spoke to Dr Sincere Austin, he has not seen the patient since >1 yr. If antiphospholipid antibodies negative then the patient can come off Eliquis  -10/17 V/Q high probability for PE  -Per oncology the patient should be continued to Eliquis indefinitely    ESRD  -on HD TTS    Anemia  -of chronic disease  -Plan to transfuse 2 units PRBC with HD 3/15    Severe hepatic steatosis  -monitor    HTN  -stable    Severe protein calorie malnutrition  -Consult nutrition  -nutritional supp    Renal diet    Code status: FULL CODE  DVT prophylaxis: Eliquis  PTA: home    Plan: Follow ID, nephrology, surgery    Care Plan discussed with: Patient/Family  Disposition: Home w/Family     Hospital Problems  Date Reviewed: 3/11/2018          Codes Class Noted POA    * (Principal)Peritonitis [facility]) ICD-10-CM: K65.9  ICD-9-CM: 567.9  3/11/2018 Yes                Review of Systems:   A comprehensive review of systems was negative except for that written in the HPI. Vital Signs:    Last 24hrs VS reviewed since prior progress note. Most recent are:  Visit Vitals    /85 (BP 1 Location: Left arm, BP Patient Position: At rest;Supine)    Pulse 86    Temp 97.8 °F (36.6 °C)    Resp 18    Ht 5' 5\" (1.651 m)    Wt 68.9 kg (151 lb 14.4 oz)    SpO2 97%    BMI 25.28 kg/m2         Intake/Output Summary (Last 24 hours) at 03/15/18 1017  Last data filed at 03/14/18 1328   Gross per 24 hour   Intake               50 ml   Output             3800 ml   Net            -3750 ml        Physical Examination:             Constitutional:  No acute distress, cooperative, pleasant    ENT:  Oral mucous moist, oropharynx benign. Neck supple,    Resp:  CTA bilaterally. No wheezing/rhonchi/rales. No accessory muscle use   CV:  Regular rhythm, normal rate, no murmurs, gallops, rubs    GI:  Soft, non distended, non tender. normoactive bowel sounds, no hepatosplenomegaly     Musculoskeletal:  No edema, warm, 2+ pulses throughout    Neurologic:  Moves all extremities.   AAOx3, CN II-XII reviewed     Skin:  Good turgor, no rashes or ulcers       Data Review:    Review and/or order of clinical lab test      Labs:     Recent Labs      03/15/18   0456  03/14/18   0450   WBC  6.8  9.9   HGB  7.1*  9.1*   HCT  23.3*  29.9*   PLT  95*  108*     Recent Labs      03/15/18   0456  03/14/18   0450  03/13/18   0425   NA  137  138  137  136   K  3.2*  3.2*  3.0*  4.3   CL  98  98  98  98   CO2  30  31  29  28   BUN  31*  31*  21*  31*   CREA  4.34*  4.30*  3.20*  4.92*   GLU  81  87  96  123*   CA  7.8*  8.0*  6.6*  8.6   MG  1.8  1.9  1.7   PHOS  3.0  2.0*  3.0     Recent Labs 03/15/18   0456  03/14/18   0450  03/13/18   0425   SGOT  20  25  12*   ALT  <6*  9*  <6*   AP  61  106  73   TBILI  0.4  0.6  0.8   TP  5.7*  7.0  6.7   ALB  1.1*  1.4*  1.3*   GLOB  4.6*  5.6*  5.4*     Recent Labs      03/14/18   0450   INR  1.4*   PTP  14.3*   APTT  35.5*      No results for input(s): FE, TIBC, PSAT, FERR in the last 72 hours. Lab Results   Component Value Date/Time    Folate 3.9 11/06/2017 11:49 AM      No results for input(s): PH, PCO2, PO2 in the last 72 hours. No results for input(s): CPK, CKNDX, TROIQ in the last 72 hours.     No lab exists for component: CPKMB  Lab Results   Component Value Date/Time    Cholesterol, total 117 09/25/2015 02:02 PM    HDL Cholesterol 31 (L) 09/25/2015 02:02 PM    LDL, calculated 57 09/25/2015 02:02 PM    Triglyceride 146 09/25/2015 02:02 PM    CHOL/HDL Ratio 7.7 (H) 05/31/2009 10:30 AM     Lab Results   Component Value Date/Time    Glucose (POC) 77 03/15/2018 07:53 AM    Glucose (POC) 76 03/15/2018 07:20 AM    Glucose (POC) 108 (H) 03/14/2018 09:00 PM    Glucose (POC) 92 03/14/2018 04:04 PM    Glucose (POC) 80 03/14/2018 07:03 AM     Lab Results   Component Value Date/Time    Color YELLOW/STRAW 08/12/2016 09:06 PM    Appearance CLEAR 08/12/2016 09:06 PM    Specific gravity 1.017 08/12/2016 09:06 PM    Specific gravity 1.010 07/11/2016 12:44 PM    pH (UA) 7.0 08/12/2016 09:06 PM    Protein 300 (A) 08/12/2016 09:06 PM    Glucose NEGATIVE  08/12/2016 09:06 PM    Ketone TRACE (A) 08/12/2016 09:06 PM    Bilirubin NEGATIVE  07/11/2016 12:44 PM    Urobilinogen 1.0 08/12/2016 09:06 PM    Nitrites NEGATIVE  08/12/2016 09:06 PM    Leukocyte Esterase NEGATIVE  08/12/2016 09:06 PM    Epithelial cells MODERATE (A) 08/12/2016 09:06 PM    Bacteria 1+ (A) 08/12/2016 09:06 PM    WBC 0-4 08/12/2016 09:06 PM    RBC 0-5 08/12/2016 09:06 PM         Medications Reviewed:     Current Facility-Administered Medications   Medication Dose Route Frequency    apixaban (ELIQUIS) tablet 5 mg  5 mg Oral BID    HYDROmorphone (DILAUDID) injection 0.5 mg  0.5 mg IntraVENous Q3H PRN    glucose chewable tablet 16 g  4 Tab Oral PRN    dextrose (D50W) injection syrg 12.5-25 g  12.5-25 g IntraVENous PRN    glucagon (GLUCAGEN) injection 1 mg  1 mg IntraMUSCular PRN    epoetin pia (EPOGEN;PROCRIT) 14,000 Units  14,000 Units SubCUTAneous DIALYSIS TUE, THU & SAT    insulin lispro (HUMALOG) injection   SubCUTAneous AC&HS    predniSONE (DELTASONE) tablet 5 mg  5 mg Oral DAILY    pantoprazole (PROTONIX) tablet 40 mg  40 mg Oral ACB    allopurinol (ZYLOPRIM) tablet 100 mg  100 mg Oral DAILY AFTER BREAKFAST    cyanocobalamin (VITAMIN B12) tablet 2,500 mcg  2,500 mcg Oral DAILY    hydroxychloroquine (PLAQUENIL) tablet 200 mg  200 mg Oral BID    albuterol (PROVENTIL VENTOLIN) nebulizer solution 2.5 mg  2.5 mg Nebulization Q4H PRN    azaTHIOprine (IMURAN) tablet 50 mg  50 mg Oral DAILY AFTER BREAKFAST    gemfibrozil (LOPID) tablet 300 mg  300 mg Oral DAILY    oxyCODONE IR (ROXICODONE) tablet 2.5-5 mg  2.5-5 mg Oral Q6H PRN    amitriptyline (ELAVIL) tablet 25 mg  25 mg Oral QHS    sodium chloride (NS) flush 5-10 mL  5-10 mL IntraVENous Q8H    sodium chloride (NS) flush 5-10 mL  5-10 mL IntraVENous PRN    ondansetron (ZOFRAN) injection 4 mg  4 mg IntraVENous Q4H PRN    piperacillin-tazobactam (ZOSYN) 3.375 g in 0.9% sodium chloride (MBP/ADV) 100 mL  3.375 g IntraVENous Q12H    arformoterol (BROVANA) neb solution 15 mcg  15 mcg Nebulization BID RT    And    budesonide (PULMICORT) 500 mcg/2 ml nebulizer suspension  500 mcg Nebulization BID RT     ______________________________________________________________________  EXPECTED LENGTH OF STAY: 4d 21h  ACTUAL LENGTH OF STAY:          MD Emeka

## 2018-03-15 NOTE — PROGRESS NOTES
Occupational Therapy    Acknowledge OT orders and completed chart review. Patient currently on dialysis. Will follow up for OT eval tomorrow.       Thank you,    Guanaco Rosalse OTR/L

## 2018-03-16 LAB
ABO + RH BLD: NORMAL
ALBUMIN SERPL-MCNC: 1.1 G/DL (ref 3.5–5)
ALBUMIN/GLOB SERPL: 0.2 {RATIO} (ref 1.1–2.2)
ALP SERPL-CCNC: 64 U/L (ref 45–117)
ALT SERPL-CCNC: <6 U/L (ref 12–78)
ANION GAP SERPL CALC-SCNC: 8 MMOL/L (ref 5–15)
AST SERPL-CCNC: 24 U/L (ref 15–37)
BACTERIA SPEC CULT: NORMAL
BASOPHILS # BLD: 0 K/UL (ref 0–0.1)
BASOPHILS NFR BLD: 0 % (ref 0–1)
BILIRUB SERPL-MCNC: 0.5 MG/DL (ref 0.2–1)
BLD PROD TYP BPU: NORMAL
BLD PROD TYP BPU: NORMAL
BLOOD GROUP ANTIBODIES SERPL: NORMAL
BPU ID: NORMAL
BPU ID: NORMAL
BUN SERPL-MCNC: 18 MG/DL (ref 6–20)
BUN/CREAT SERPL: 6 (ref 12–20)
CALCIUM SERPL-MCNC: 7.9 MG/DL (ref 8.5–10.1)
CHLORIDE SERPL-SCNC: 101 MMOL/L (ref 97–108)
CO2 SERPL-SCNC: 31 MMOL/L (ref 21–32)
CREAT SERPL-MCNC: 3.01 MG/DL (ref 0.55–1.02)
CROSSMATCH RESULT,%XM: NORMAL
CROSSMATCH RESULT,%XM: NORMAL
DIFFERENTIAL METHOD BLD: ABNORMAL
EOSINOPHIL # BLD: 0 K/UL (ref 0–0.4)
EOSINOPHIL NFR BLD: 0 % (ref 0–7)
ERYTHROCYTE [DISTWIDTH] IN BLOOD BY AUTOMATED COUNT: 17.4 % (ref 11.5–14.5)
GLOBULIN SER CALC-MCNC: 4.6 G/DL (ref 2–4)
GLUCOSE BLD STRIP.AUTO-MCNC: 73 MG/DL (ref 65–100)
GLUCOSE BLD STRIP.AUTO-MCNC: 77 MG/DL (ref 65–100)
GLUCOSE BLD STRIP.AUTO-MCNC: 80 MG/DL (ref 65–100)
GLUCOSE BLD STRIP.AUTO-MCNC: 86 MG/DL (ref 65–100)
GLUCOSE BLD STRIP.AUTO-MCNC: 88 MG/DL (ref 65–100)
GLUCOSE SERPL-MCNC: 85 MG/DL (ref 65–100)
HCT VFR BLD AUTO: 27.6 % (ref 35–47)
HGB BLD-MCNC: 8.7 G/DL (ref 11.5–16)
IMM GRANULOCYTES # BLD: 0 K/UL (ref 0–0.04)
IMM GRANULOCYTES NFR BLD AUTO: 1 % (ref 0–0.5)
LYMPHOCYTES # BLD: 0.9 K/UL (ref 0.8–3.5)
LYMPHOCYTES NFR BLD: 15 % (ref 12–49)
MAGNESIUM SERPL-MCNC: 1.8 MG/DL (ref 1.6–2.4)
MCH RBC QN AUTO: 25.7 PG (ref 26–34)
MCHC RBC AUTO-ENTMCNC: 31.5 G/DL (ref 30–36.5)
MCV RBC AUTO: 81.4 FL (ref 80–99)
MONOCYTES # BLD: 0.3 K/UL (ref 0–1)
MONOCYTES NFR BLD: 4 % (ref 5–13)
NEUTS SEG # BLD: 5.2 K/UL (ref 1.8–8)
NEUTS SEG NFR BLD: 80 % (ref 32–75)
NRBC # BLD: 0 K/UL (ref 0–0.01)
NRBC BLD-RTO: 0 PER 100 WBC
PHOSPHATE SERPL-MCNC: 2.1 MG/DL (ref 2.6–4.7)
PLATELET # BLD AUTO: 91 K/UL (ref 150–400)
PMV BLD AUTO: 12.9 FL (ref 8.9–12.9)
POTASSIUM SERPL-SCNC: 3.1 MMOL/L (ref 3.5–5.1)
PROT SERPL-MCNC: 5.7 G/DL (ref 6.4–8.2)
RBC # BLD AUTO: 3.39 M/UL (ref 3.8–5.2)
SERVICE CMNT-IMP: NORMAL
SODIUM SERPL-SCNC: 140 MMOL/L (ref 136–145)
SPECIMEN EXP DATE BLD: NORMAL
STATUS OF UNIT,%ST: NORMAL
STATUS OF UNIT,%ST: NORMAL
UNIT DIVISION, %UDIV: 0
UNIT DIVISION, %UDIV: 0
WBC # BLD AUTO: 6.4 K/UL (ref 3.6–11)

## 2018-03-16 PROCEDURE — 74011000258 HC RX REV CODE- 258: Performed by: INTERNAL MEDICINE

## 2018-03-16 PROCEDURE — 74011250636 HC RX REV CODE- 250/636: Performed by: HOSPITALIST

## 2018-03-16 PROCEDURE — 97535 SELF CARE MNGMENT TRAINING: CPT

## 2018-03-16 PROCEDURE — 74011636637 HC RX REV CODE- 636/637: Performed by: HOSPITALIST

## 2018-03-16 PROCEDURE — 82962 GLUCOSE BLOOD TEST: CPT

## 2018-03-16 PROCEDURE — 97166 OT EVAL MOD COMPLEX 45 MIN: CPT

## 2018-03-16 PROCEDURE — 74011000258 HC RX REV CODE- 258: Performed by: HOSPITALIST

## 2018-03-16 PROCEDURE — 74011250636 HC RX REV CODE- 250/636: Performed by: INTERNAL MEDICINE

## 2018-03-16 PROCEDURE — G8988 SELF CARE GOAL STATUS: HCPCS

## 2018-03-16 PROCEDURE — 80053 COMPREHEN METABOLIC PANEL: CPT | Performed by: INTERNAL MEDICINE

## 2018-03-16 PROCEDURE — 83735 ASSAY OF MAGNESIUM: CPT | Performed by: INTERNAL MEDICINE

## 2018-03-16 PROCEDURE — 85025 COMPLETE CBC W/AUTO DIFF WBC: CPT | Performed by: HOSPITALIST

## 2018-03-16 PROCEDURE — 74011000250 HC RX REV CODE- 250: Performed by: HOSPITALIST

## 2018-03-16 PROCEDURE — 74011250637 HC RX REV CODE- 250/637: Performed by: INTERNAL MEDICINE

## 2018-03-16 PROCEDURE — 65660000000 HC RM CCU STEPDOWN

## 2018-03-16 PROCEDURE — 84100 ASSAY OF PHOSPHORUS: CPT | Performed by: INTERNAL MEDICINE

## 2018-03-16 PROCEDURE — 36415 COLL VENOUS BLD VENIPUNCTURE: CPT | Performed by: HOSPITALIST

## 2018-03-16 PROCEDURE — 74011250637 HC RX REV CODE- 250/637: Performed by: HOSPITALIST

## 2018-03-16 PROCEDURE — 94640 AIRWAY INHALATION TREATMENT: CPT

## 2018-03-16 PROCEDURE — G8987 SELF CARE CURRENT STATUS: HCPCS

## 2018-03-16 PROCEDURE — 97161 PT EVAL LOW COMPLEX 20 MIN: CPT

## 2018-03-16 RX ORDER — POTASSIUM CHLORIDE 750 MG/1
40 TABLET, FILM COATED, EXTENDED RELEASE ORAL
Status: COMPLETED | OUTPATIENT
Start: 2018-03-16 | End: 2018-03-16

## 2018-03-16 RX ADMIN — HYDROMORPHONE HYDROCHLORIDE 0.5 MG: 2 INJECTION INTRAMUSCULAR; INTRAVENOUS; SUBCUTANEOUS at 14:11

## 2018-03-16 RX ADMIN — Medication 10 ML: at 14:00

## 2018-03-16 RX ADMIN — ONDANSETRON 4 MG: 2 INJECTION INTRAMUSCULAR; INTRAVENOUS at 18:19

## 2018-03-16 RX ADMIN — ALLOPURINOL 100 MG: 100 TABLET ORAL at 08:22

## 2018-03-16 RX ADMIN — HYDROXYCHLOROQUINE SULFATE 200 MG: 200 TABLET, FILM COATED ORAL at 17:30

## 2018-03-16 RX ADMIN — PIPERACILLIN SODIUM,TAZOBACTAM SODIUM 3.38 G: 3; .375 INJECTION, POWDER, FOR SOLUTION INTRAVENOUS at 15:53

## 2018-03-16 RX ADMIN — APIXABAN 5 MG: 5 TABLET, FILM COATED ORAL at 17:30

## 2018-03-16 RX ADMIN — HYDROMORPHONE HYDROCHLORIDE 0.5 MG: 2 INJECTION INTRAMUSCULAR; INTRAVENOUS; SUBCUTANEOUS at 03:11

## 2018-03-16 RX ADMIN — AZATHIOPRINE 50 MG: 50 TABLET ORAL at 08:24

## 2018-03-16 RX ADMIN — POTASSIUM CHLORIDE 40 MEQ: 750 TABLET, EXTENDED RELEASE ORAL at 14:13

## 2018-03-16 RX ADMIN — SODIUM CHLORIDE 1 G: 900 INJECTION, SOLUTION INTRAVENOUS at 20:27

## 2018-03-16 RX ADMIN — HYDROMORPHONE HYDROCHLORIDE 0.5 MG: 2 INJECTION INTRAMUSCULAR; INTRAVENOUS; SUBCUTANEOUS at 20:41

## 2018-03-16 RX ADMIN — GEMFIBROZIL 300 MG: 600 TABLET ORAL at 08:22

## 2018-03-16 RX ADMIN — Medication 10 ML: at 23:03

## 2018-03-16 RX ADMIN — ARFORMOTEROL TARTRATE 15 MCG: 15 SOLUTION RESPIRATORY (INHALATION) at 20:14

## 2018-03-16 RX ADMIN — AMITRIPTYLINE HYDROCHLORIDE 25 MG: 10 TABLET, FILM COATED ORAL at 23:02

## 2018-03-16 RX ADMIN — Medication 16 G: at 23:07

## 2018-03-16 RX ADMIN — OXYCODONE HYDROCHLORIDE 5 MG: 5 TABLET ORAL at 12:09

## 2018-03-16 RX ADMIN — BUDESONIDE 500 MCG: 0.5 INHALANT RESPIRATORY (INHALATION) at 20:14

## 2018-03-16 RX ADMIN — ARFORMOTEROL TARTRATE 15 MCG: 15 SOLUTION RESPIRATORY (INHALATION) at 07:26

## 2018-03-16 RX ADMIN — PANTOPRAZOLE SODIUM 40 MG: 40 TABLET, DELAYED RELEASE ORAL at 07:01

## 2018-03-16 RX ADMIN — HYDROXYCHLOROQUINE SULFATE 200 MG: 200 TABLET, FILM COATED ORAL at 08:21

## 2018-03-16 RX ADMIN — PREDNISONE 5 MG: 5 TABLET ORAL at 08:22

## 2018-03-16 RX ADMIN — APIXABAN 5 MG: 5 TABLET, FILM COATED ORAL at 08:22

## 2018-03-16 RX ADMIN — Medication 10 ML: at 06:00

## 2018-03-16 RX ADMIN — Medication 2500 MCG: at 08:22

## 2018-03-16 RX ADMIN — OXYCODONE HYDROCHLORIDE 5 MG: 5 TABLET ORAL at 18:19

## 2018-03-16 RX ADMIN — BUDESONIDE 500 MCG: 0.5 INHALANT RESPIRATORY (INHALATION) at 07:26

## 2018-03-16 RX ADMIN — PIPERACILLIN SODIUM,TAZOBACTAM SODIUM 3.38 G: 3; .375 INJECTION, POWDER, FOR SOLUTION INTRAVENOUS at 03:18

## 2018-03-16 NOTE — PROGRESS NOTES
Patient is resting in bed family at the bedside. Right side sore from incision of drain. The drain is draining properly tan thick drainage. Patient complains of soreness and states her side hurts no other complaints at this time. 2124 patient given pain medication for pain in right side where drain was inserted. 2330 patient resting in bed pain has decreased to 4/10 patient is resting comfortable in bed.    0315 patient restless in bed pain in her right lower abdomen from drain insertion pain medication given. Blood drawn and sent to lab.

## 2018-03-16 NOTE — PROGRESS NOTES
Problem: Mobility Impaired (Adult and Pediatric)  Goal: *Acute Goals and Plan of Care (Insert Text)  Physical Therapy Goals  Initiated 3/16/2018  1. Patient will move from supine to sit and sit to supine  in bed with supervision/set-up within 7 day(s). 2.  Patient will transfer from bed to chair and chair to bed with supervision/set-up using the least restrictive device within 7 day(s). 3.  Patient will perform sit to stand with minimal assistance/contact guard assist within 7 day(s). 4.  Patient will ambulate with minimal assistance/contact guard assist for 50 feet with the least restrictive device within 7 day(s). physical Therapy EVALUATION  Patient: Vandana Varghese (38 y.o. female)  Date: 3/16/2018  Primary Diagnosis: Peritonitis ([address]  Precautions:   Fall (abd drain)    ASSESSMENT :  Based on the objective data described below, the patient presents with globally decreased strength, balance, ROM, and increased abdominal pain resulting in impaired functional mobility from reported baseline status. Pt with complicated medical hx and reports having a home aide x6 hrs daily. She lives at home with her elderly grandmother and 7 yo DTR as well. Pt reports walking in home without DME/indepdnently and brief community distances as needed for accessing HD and going to DTR's events (school, etc...). Pt does not have any mobility DME at this time but when asked, did state that  (?) has been attempting to get her a power chair for approx 1 month (?). Pt also reports having a BSC and shower chair and normally does her own ADLs but gets assistance for IADLs from paid caregiver. Pt currently requiring upwards of modA to Grant x2 for functional mobility today (pt agreeable to walk to bathroom and then OOB to chair). Recommend RW vs rollator trial next session for balance and energy conservation.   Discussed case with OT and feel that pt would greatly benefit from comprehensive stay at inpt rehab briefly prior to return home. Pt will benefit from energy conservation education, DME assessment and procurement for her specific needs, and further strengthening/endurance building for indep with ADLs and mobility again. Pt could certainly tolerate 3 hrs of therapy with appropriate rest breaks and education time. Pt in chair when left in NAD with all needs met. PT to f/u Monday for progression of activity, but notified nursing of need for pt to be OOB to chair for all meals and walking to/from bathroom for toileting over the weekend. Patient will benefit from skilled intervention to address the above impairments. Patients rehabilitation potential is considered to be Fair  Factors which may influence rehabilitation potential include:   []         None noted  []         Mental ability/status  [x]         Medical condition  []         Home/family situation and support systems  []         Safety awareness  []         Pain tolerance/management  []         Other:      PLAN :  Recommendations and Planned Interventions:  [x]           Bed Mobility Training             []    Neuromuscular Re-Education  [x]           Transfer Training                   []    Orthotic/Prosthetic Training  [x]           Gait Training                         []    Modalities  [x]           Therapeutic Exercises           []    Edema Management/Control  [x]           Therapeutic Activities            [x]    Patient and Family Training/Education  []           Other (comment):    Frequency/Duration: Patient will be followed by physical therapy  5 times a week to address goals. Discharge Recommendations: Inpatient Rehab and To Be Determined  Further Equipment Recommendations for Discharge: TBD     SUBJECTIVE:   Patient stated Sweta Ferreira, I can do that. .. This is my weak side [left].   Pt agreeable to seated LE therex post session.     OBJECTIVE DATA SUMMARY:   HISTORY:    Past Medical History:   Diagnosis Date    Anemia     secondary to lupus  Asthma     no inhaler use in past 2 to 3 years    Carditis     Chronic kidney disease     ESRD    Chronic pain     DDD (degenerative disc disease), lumbar     [facility]     GERD (gastroesophageal reflux disease)     Heart failure ([facility]     Hemodialysis patient ([facility] 2017    [address]  Tuesday,  Thursday,  and Saturday. 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  Poor historian 2018    With medications    Thromboembolus ([facility] 2013    lungs    Transfusion history     Last Transfusion 2017  at [facility]     Past Surgical History:   Procedure Laterality Date    HX  SECTION  11/2006    x1    HX OTHER SURGICAL  9/16/15    INSERTION PD CATH;  Removed 2017    HX VASCULAR ACCESS Right 2017    Double-Lumen henry catheter upper chest     Prior Level of Function/Home Situation: paid caregivers 6 hrs per day, indep with ADLs, no DME for ambulation  Personal factors and/or comorbidities impacting plan of care: Lupus, HD, generally deconditioned    Home Situation  Home Environment: Private residence  # Steps to Enter: 0  Wheelchair Ramp: Yes  One/Two Story Residence: Two story, live on 1st floor  # of Interior Steps: 10  Interior Rails: Both  Living Alone: No  Support Systems: Family member(s), Home care staff  Patient Expects to be Discharged to[de-identified] Unknown  Current DME Used/Available at Home: Commode, bedside, Shower chair  Tub or Shower Type: Tub/Shower combination    EXAMINATION/PRESENTATION/DECISION MAKING:   Critical Behavior:  Neurologic State: Alert, Appropriate for age  Orientation Level: Appropriate for age  Cognition: Follows commands  Safety/Judgement: Fall prevention  Hearing: Auditory  Auditory Impairment: None  Range Of Motion:  AROM: Generally decreased, functional  Strength:    Strength: Generally decreased, functional  Coordination:  Coordination: Generally decreased, functional  Functional Mobility:   Transfers:  Sit to Stand: Moderate assistance; Additional time  Stand to Sit: Contact guard assistance; Additional time  Bed to Chair: Moderate assistance  Balance:   Sitting: Intact  Standing: Impaired; With support  Standing - Static: Fair  Standing - Dynamic : Fair  Ambulation/Gait Training:  Distance (ft): 6 Feet (ft) (x2; pt ambulated to bathroom and backk to chair)  Assistive Device: Gait belt (BUEs support recommend rollator/RW trial next session)  Ambulation - Level of Assistance: Minimal assistance; Additional time;Assist x2     Gait Description (WDL): Exceptions to WDL  Gait Abnormalities: Decreased step clearance; Path deviations;Trunk sway increased; Antalgic (pt with flexed trunk and posterior LOB)        Base of Support: Widened;Center of gravity altered     Speed/Vanita: Slow;Pace decreased (<100 feet/min)  Step Length: Right shortened;Left shortened  Therapeutic Exercises:   Seated: APs, LAQ, hip flexion x10 reps encouraged in chair    Functional Measure:  Timed up and go:    Timed Get Up And Go Test:  (Unable)     Timed Up and Go and G-code impairment scale:  Percentage of Impairment CH    0%   CI    1-19% CJ    20-39% CK    40-59% CL    60-79% CM    80-99% CN     100%   Timed   Score 0-56 10 11-12 13-14 15-16 17-18 19 20       < than 10 seconds=Normal  Greater then 13.5 seconds (in elderly)=Increased fall risk   Cleo Moreno, Luci PORTILLO. Predicting the probability for falls in community dwelling older adults using the Timed Up and Go Test. Phys Ther. 2000;80:896-903. G codes:   In compliance with CMSs Claims Based Outcome Reporting, the following G-code set was chosen for this patient based on their primary functional limitation being treated: The outcome measure chosen to determine the severity of the functional limitation was the TUG with a score of 20/20 which was correlated with the impairment scale. ? Mobility - Walking and Moving Around:     - CURRENT STATUS: CN - 100% impaired, limited or restricted    - GOAL STATUS: CM - 80%-99% impaired, limited or restricted    - D/C STATUS:  ---------------To be determined---------------   Pain:  Pain Scale 1: Numeric (0 - 10)  Pain Intensity 1: 7  Pain Location 1: Abdomen  Pain Orientation 1: Anterior;Right  Pain Description 1: Sore  Pain Intervention(s) 1: Medication (see MAR)  Activity Tolerance:   VSS  After treatment:   [x]         Patient left in no apparent distress sitting up in chair  []         Patient left in no apparent distress in bed  [x]         Call bell left within reach  [x]         Nursing notified  [x]         Caregiver present  []         Bed alarm activated    COMMUNICATION/EDUCATION:   The patients plan of care was discussed with: Registered Nurse. [x]         Fall prevention education was provided and the patient/caregiver indicated understanding. [x]         Patient/family have participated as able in goal setting and plan of care. [x]         Patient/family agree to work toward stated goals and plan of care. []         Patient understands intent and goals of therapy, but is neutral about his/her participation. []         Patient is unable to participate in goal setting and plan of care.     Thank you for this referral.  Samia Finn   Time Calculation: 18 mins

## 2018-03-16 NOTE — PROGRESS NOTES
Problem: Self Care Deficits Care Plan (Adult)  Goal: *Acute Goals and Plan of Care (Insert Text)  Occupational Therapy Goals  Initiated 3/16/2018  1. Patient will perform grooming with independence standing at sink for 4 tasks with NO LOB within 7 day(s). 2.  Patient will perform bathing with supervision/set-up within 7 day(s). 3.  Patient will perform upper body dressing and LB dressing with supervision/set-up with AE and/or AE PRN within 7 day(s). 4.  Patient will perform toilet transfers with supervision/set-up within 7 day(s). 5.  Patient will perform all aspects of toileting with supervision/set-up within 7 day(s). 6.  Patient will participate in upper extremity therapeutic exercise/activities with supervision/set-up for 5 minutes within 7 day(s). 7.  Patient will utilize energy conservation techniques during functional activities with verbal cues within 7 day(s). Occupational Therapy EVALUATION  Patient: Sky Pate (58 y.o. female)  Date: 3/16/2018  Primary Diagnosis: Peritonitis ([address]        Precautions: HD  Fall (abd drain)    ASSESSMENT :  Based on the objective data described below, the patient presents with general weakness/debility s/p diagnosis above. Patient was I for simple ADL on most days PTA, had assistance only if she was too weak for ADL, with exception of bathing and I-ADL (caregiver assists her with bathing, I-ADL, driving to/from appointments, caregiver 6 hours x 7 days a week). Patient was ambulatory withOUT AD walking in community to/from appointments and in/out of 6year old daughter's school. She is currently mod A to I for simple ADL with posterior LOB with clothing management on toilet requiring mod A to recover, mod A for sit to stand from toilet, mod A bed mobility, mod  A for LB ADL tasks.  This writer feels patient would benefit from rehab (IP preferred) as she needs education regarding energy conservation strategies, UE therapeutic exercise program, AE, AD/DME needs for increasing her I in home, and for increasing her community access (states a power chair has been \"in the [facility]" with  for over a month). If patient d/c home, [facility] OT is recommended and HANDS ON A is recommended at this time for any standing tasks. Patient will benefit from skilled intervention to address the above impairments. Patients rehabilitation potential is considered to be Excellent  Factors which may influence rehabilitation potential include:   []             None noted  []             Mental ability/status  [x]             Medical condition  []             Home/family situation and support systems  []             Safety awareness  []             Pain tolerance/management  []             Other:      PLAN :  Recommendations and Planned Interventions:  [x]               Self Care Training                  [x]        Therapeutic Activities  [x]               Functional Mobility Training    []        Cognitive Retraining  [x]               Therapeutic Exercises           [x]        Endurance Activities  [x]               Balance Training                   [x]        Neuromuscular Re-Education  []               Visual/Perceptual Training     [x]   Home Safety Training  [x]               Patient Education                 [x]        Family Training/Education  []               Other (comment):    Frequency/Duration: Patient will be followed by occupational therapy 5 times a week to address goals. Discharge Recommendations: Inpatient Rehab and To Be Determined, otherwise home with [facility] OT  Further Equipment Recommendations for Discharge: TBD--would benefit from AD, w/c for community access, AE training TBD     SUBJECTIVE:   Patient stated I am just so weak.     OBJECTIVE DATA SUMMARY:   HISTORY:   Past Medical History:   Diagnosis Date    Anemia     secondary to lupus    Asthma     no inhaler use in past 2 to 3 years    Carditis     Chronic kidney disease     ESRD    Chronic pain     DDD (degenerative disc disease), lumbar     ESRD (end stage renal disease) ([address]     GERD (gastroesophageal reflux disease)     Heart failure ([address]     Hemodialysis patient [facility]) 2017    [address]  Tuesday,  Thursday,  and Saturday. 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  Poor historian 2018    With medications    Thromboembolus ([address]     lungs    Transfusion history     Last Transfusion 2017  at [facility]     Past Surgical History:   Procedure Laterality Date    HX  SECTION  11/2006    x1    HX OTHER SURGICAL  9/16/15    INSERTION PD CATH; Removed 2017    HX VASCULAR ACCESS Right 2017    Double-Lumen henry catheter upper chest       Prior Level of Function/Environment/Context: Patient was I for simple ADL on most days PTA, had assistance only if she was too weak for ADL with HD fatigue, with exception of bathing and I-ADL (caregiver assists her with bathing, I-ADL, driving to/from appointments, caregiver 6 hours x 7 days a week).   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  Performance Patterns (routines, roles, habits, and rituals):   Personal Interests and/or values: increasing ability to get to daughter's activities  Expanded or extensive additional review of patient history:     Home Situation  Home Environment: Private residence  # Steps to Enter: 0  One/Two Story Residence: Two story  # of Interior Steps: 10  Interior Rails: Both  Living Alone: No  Support Systems: Parent  Patient Expects to be Discharged to[employer] residence  Current DME Used/Available at Home: Shower chair, Commode, bedside  Tub or Shower Type: Tub/Shower combination  []  Right hand dominant   [x]  Left hand dominant    EXAMINATION OF PERFORMANCE DEFICITS:  Cognitive/Behavioral Status:  Neurologic State: Alert  Orientation Level: Oriented X4                    Hearing: Auditory  Auditory Impairment: None    Vision/Perceptual:                    no c/o                 Range of Motion:  BUE  AROM: Generally decreased, functional (2/3 AROM B shoulders (100 degrees elevation), distal WFL)   Can tailor sit to reach feet                         Strength:  BUE  Strength: Generally decreased, functional (3+/5 grossly B UE)                Coordination:  Coordination: Generally decreased, functional  Fine Motor Skills-Upper: Left Intact; Right Intact    Gross Motor Skills-Upper: Left Intact; Right Intact    Tone & Sensation:  NT                            Balance:  Sitting: Intact; Without support  Standing: Impaired; Without support  Standing - Static: Fair    Functional Mobility and Transfers for ADLs:  Bed Mobility:  Supine to Sit: Moderate assistance; Additional time;Assist x1  Scooting: Minimum assistance    Transfers:  Sit to Stand: Moderate assistance  Stand to Sit: Contact guard assistance  Bed to Chair: Moderate assistance  Toilet Transfer : Moderate assistance    ADL Assessment:  Feeding: Independent    Oral Facial Hygiene/Grooming: Setup    Bathing: Moderate assistance    Upper Body Dressing: Setup    Lower Body Dressing: Moderate assistance; Additional time    Toileting:  Moderate assistance;Assist x1;Additional time                ADL Intervention and task modifications:       Grooming  Grooming Assistance:  (standing at sink stabilizing hips on sink anteriorly)  Washing Face: Contact guard assistance  Washing Hands: Contact guard assistance  Brushing Teeth: Contact guard assistance               HHA of 2 (min a of 2) for ambulation into/out of bathroom, unsteady on feet, pull to stand with min A of 2. Educated on OOB to chair for all meals (provided armchair in room at bedside for OOB to chair). Lower Body Dressing Assistance  Socks: Stand-by assistance; Compensatory technique training (cues to tailor sit)    Toileting  Toileting Assistance: Moderate assistance  Clothing Management: Moderate assistance (LOB posterior with R handed grab bar)  Cues: Verbal cues provided; Tactile cues provided;Physical assistance for pants up;Physical assistance for pants down  Adaptive Equipment: Grab bars         Therapeutic Exercise:    Functional Measure:  Barthel Index:    Bathin  Bladder: 10  Bowels: 10  Groomin  Dressin  Feeding: 10  Mobility: 0  Stairs: 0  Toilet Use: 5  Transfer (Bed to Chair and Back): 10  Total: 55       Barthel and G-code impairment scale:  Percentage of impairment CH  0% CI  1-19% CJ  20-39% CK  40-59% CL  60-79% CM  80-99% CN  100%   Barthel Score 0-100 100 99-80 79-60 59-40 20-39 1-19   0   Barthel Score 0-20 20 17-19 13-16 9-12 5-8 1-4 0      The Barthel ADL Index: Guidelines  1. The index should be used as a record of what a patient does, not as a record of what a patient could do. 2. The main aim is to establish degree of independence from any help, physical or verbal, however minor and for whatever reason. 3. The need for supervision renders the patient not independent. 4. A patient's performance should be established using the best available evidence. Asking the patient, friends/relatives and nurses are the usual sources, but direct observation and common sense are also important. However direct testing is not needed. 5. Usually the patient's performance over the preceding 24-48 hours is important, but occasionally longer periods will be relevant. 6. Middle categories imply that the patient supplies over 50 per cent of the effort. 7. Use of aids to be independent is allowed. Lisette Samuel., Barthel, D.W. ([id number]).  Functional evaluation: the Barthel Index. [address] [facility] (14)2. PATRICK NavarroF, Orlando Irving, Kai Ruth., [place], [address] (1999). Measuring the change indisability after inpatient rehabilitation; comparison of the responsiveness of the Barthel Index and Functional Harper Measure. Journal of Neurology, Neurosurgery, and Psychiatry, 66(4), 438-678. Morgan Patience, N.J.A, LORE Rivera, & Remigio Aranda M.A. (2004.) Assessment of post-stroke quality of life in cost-effectiveness studies: The usefulness of the Barthel Index and the EuroQoL-5D. Quality of Life Research, 13, 458-71       G codes: In compliance with CMSs Claims Based Outcome Reporting, the following G-code set was chosen for this patient based on their primary functional limitation being treated: The outcome measure chosen to determine the severity of the functional limitation was the Barthel Index with a score of 55/100 which was correlated with the impairment scale. ? Self Care:     - CURRENT STATUS: CK - 40%-59% impaired, limited or restricted    - GOAL STATUS: CJ - 20%-39% impaired, limited or restricted    - D/C STATUS:  ---------------To be determined---------------     Occupational Therapy Evaluation Charge Determination   History Examination Decision-Making   MEDIUM Complexity : Expanded review of history including physical, cognitive and psychosocial  history  MEDIUM Complexity : 3-5 performance deficits relating to physical, cognitive , or psychosocial skils that result in activity limitations and / or participation restrictions MEDIUM Complexity : Patient may present with comorbidities that affect occupational performnce.  Miniml to moderate modification of tasks or assistance (eg, physical or verbal ) with assesment(s) is necessary to enable patient to complete evaluation       Based on the above components, the patient evaluation is determined to be of the following complexity level: MEDIUM  Pain:  Pain Scale 1: Numeric (0 - 10)  Pain Intensity 1: 7  Pain Location 1: Abdomen  Pain Orientation 1: Anterior;Right  Pain Description 1: Sore  Pain Intervention(s) 1: Medication (see MAR)  Activity Tolerance:   Good with slow pace  Please refer to the flowsheet for vital signs taken during this treatment. After treatment:   [x] Patient left in no apparent distress sitting up in chair  [] Patient left in no apparent distress in bed  [x] Call bell left within reach  [x] Nursing notified  [] Caregiver present  [] Bed alarm activated    COMMUNICATION/EDUCATION:   The patients plan of care was discussed with: Physical Therapist and Registered Nurse. [x] Home safety education was provided and the patient/caregiver indicated understanding. [x] Patient/family have participated as able in goal setting and plan of care. [x] Patient/family agree to work toward stated goals and plan of care. [] Patient understands intent and goals of therapy, but is neutral about his/her participation. [] Patient is unable to participate in goal setting and plan of care. This patients plan of care is appropriate for delegation to [facility].     Thank you for this referral.  Andreea Mahajan OTR/L  Time Calculation: 33 mins

## 2018-03-16 NOTE — PROGRESS NOTES
Hospitalist Progress Note  Pili Gomez MD  Answering service: [phone] OR [phone] from in house phone  Cell: [phone]      Date of Service:  3/16/2018  NAME:  Mya Benoit  :  1985  MRN:  [id number]      Admission Summary: This is a 78-year-old female with multiple medical problems including SLE, end-stage renal disease (on hemodialysis, TTS), pulmonary embolism, on Eliquis (), hyperlipidemia, hypertension, recent Candida peritonitis, chronic bilateral pain. She comes over here because of abdominal pain since last 4 or 5 days.     The patient claims that last Wednesday, that is about 4 days ago, the patient started having fever and at that time, she noted that her temperature was 100.4 degrees Fahrenheit. At the same time, she also started having abdominal pain. She describes her pain as throughout the whole abdomen, sharp 7/10 to 9/10 in intensity off and on, nonradiating, slightly gets better with pain medication, but there was no triggering factor whatsoever. In any case, the next day the patient went for dialysis. She claims that over there, they did the blood cultures and put her on antibiotics. She was told that in the blood culture, there was a growth of bacteria, but she does not remember the name of the bacteria. She also does not remember the name of the antibiotic as well. Please also note that recently the patient was switched to hemodialysis and subsequently received a graft. Last Wednesday, her dialysis catheter was taken out. When the patient's abdominal pain continued then today she decided to come into the hospital.  The patient claims that she did not have any other symptoms including chest pain, shortness of breath, cough, nausea, vomiting, headache, dizziness.   No changes in bowel movements.       Interval history / Subjective:     F/u Abdominal pain   Abdominal pain and SOB improved after paracentesis  Feeling better  Assessment & Plan:     Acute peritonitis  -recent candida peritonitis  -CT abd 3/11 Large amount of free intraperitoneal fluid appears somewhat loculated  -Follow cultures, blood culture 3/11 unremarkable. Peritoneal fluid heavy E coli  -Was on Vanc/Zosyn/Fluconazole. Now Vanc/FLuconazole discontinued. Continue Zosyn  -s/p paracentesis, follow studies. With drain in place, draining thick fluid. -May need to do repeat Ct abd to make sure clearance  -Follow Surgery/ID    E coli bacteremia/Sepsis   that the patient was found to have E coli bacteremia at HD    Hypotension  -now resolved  -Was on stress steroids  -Now on regular oral steroids    Thrombocytopenia   -Monitor closely  -Will switch Zosyn to cefepime if gets worse    Bilateral pedal edema  -Dopplers negative for DVT    SLE  -continue home meds    History of PE  -on Eliquis since 2012  -Appreciate discussion with PMD 3/13. The patient was seen by hematology in 2013 but seems was lost to follow up. I think at that time the plan was to stop anticoagulation if her SLE is stable. Spoke to Dr Pedro Macdonald, he feels that the patient can come off Eliquis. His records do not suggest any recurrence of DVT/PE.  Spoke to Dr Varun Hanna, he has not seen the patient since >1 yr. If antiphospholipid antibodies negative then the patient can come off Eliquis  -10/17 V/Q high probability for PE  -Per oncology the patient should be continued to Eliquis indefinitely    ESRD  -on HD TTS    Anemia  -of chronic disease  -Transfused 2 units PRBC with HD 3/15    Severe hepatic steatosis  -monitor    HTN  -stable    Severe protein calorie malnutrition  -Consult nutrition  -nutritional supp    Renal diet    Code status: FULL CODE  DVT prophylaxis: Eliquis  PTA: home    Plan: Follow ID, nephrology, surgery    Care Plan discussed with: Patient/Family  Disposition: Home w/Family     Hospital Problems  Date Reviewed: 3/11/2018          Codes Class Noted POA    * (Principal)Peritonitis [facility]) ICD-10-CM: K65.9  ICD-9-CM: 567.9  3/11/2018 Yes                Review of Systems:   A comprehensive review of systems was negative except for that written in the HPI. Vital Signs:    Last 24hrs VS reviewed since prior progress note. Most recent are:  Visit Vitals    /85 (BP 1 Location: Left arm, BP Patient Position: Supine; At rest)    Pulse 98    Temp 98.9 °F (37.2 °C)    Resp 20    Ht 5' 5\" (1.651 m)    Wt 65 kg (143 lb 4.8 oz)    SpO2 95%    BMI 23.85 kg/m2         Intake/Output Summary (Last 24 hours) at 03/16/18 1416  Last data filed at 03/16/18 1356   Gross per 24 hour   Intake              656 ml   Output             3694 ml   Net            -3038 ml        Physical Examination:             Constitutional:  No acute distress, cooperative, pleasant, lethargic   ENT:  Oral mucous moist, oropharynx benign. Neck supple,    Resp:  CTA bilaterally. No wheezing/rhonchi/rales. No accessory muscle use   CV:  Regular rhythm, normal rate, no murmurs, gallops, rubs    GI:  Soft, non distended, non tender. normoactive bowel sounds, no hepatosplenomegaly     Musculoskeletal:  No edema, warm, 2+ pulses throughout    Neurologic:  Moves all extremities.   AAOx3, CN II-XII reviewed     Skin:  Good turgor, no rashes or ulcers       Data Review:    Review and/or order of clinical lab test      Labs:     Recent Labs      03/16/18   0335  03/15/18   0456   WBC  6.4  6.8   HGB  8.7*  7.1*   HCT  27.6*  23.3*   PLT  91*  95*     Recent Labs      03/16/18   0335  03/15/18   0456  03/14/18   0450   NA  140  137  138  137   K  3.1*  3.2*  3.2*  3.0*   CL  101  98  98  98   CO2  31  30  31  29   BUN  18  31*  31*  21*   CREA  3.01*  4.34*  4.30*  3.20*   GLU  85  81  87  96   CA  7.9*  7.8*  8.0*  6.6*   MG  1.8  1.8  1.9   PHOS  2.1*  3.0  2.0*     Recent Labs      03/16/18   0335  03/15/18   0456  03/14/18   0450   SGOT  24  20  25   ALT  <6*  <6*  9*   AP  64  61  106   TBILI 0.5  0.4  0.6   TP  5.7*  5.7*  7.0   ALB  1.1*  1.1*  1.4*   GLOB  4.6*  4.6*  5.6*     Recent Labs      03/14/18   0450   INR  1.4*   PTP  14.3*   APTT  35.5*      Recent Labs      03/15/18   1032   TIBC  52*   PSAT  42   FERR  548*      Lab Results   Component Value Date/Time    Folate 4.1 (L) 03/15/2018 10:32 AM      No results for input(s): PH, PCO2, PO2 in the last 72 hours. No results for input(s): CPK, CKNDX, TROIQ in the last 72 hours.     No lab exists for component: CPKMB  Lab Results   Component Value Date/Time    Cholesterol, total 117 09/25/2015 02:02 PM    HDL Cholesterol 31 (L) 09/25/2015 02:02 PM    LDL, calculated 57 09/25/2015 02:02 PM    Triglyceride 146 09/25/2015 02:02 PM    CHOL/HDL Ratio 7.7 (H) 05/31/2009 10:30 AM     Lab Results   Component Value Date/Time    Glucose (POC) 86 03/16/2018 12:52 PM    Glucose (POC) 80 03/16/2018 07:03 AM    Glucose (POC) 99 03/15/2018 09:59 PM    Glucose (POC) 96 03/15/2018 05:59 PM    Glucose (POC) 99 03/15/2018 11:50 AM     Lab Results   Component Value Date/Time    Color YELLOW/STRAW 08/12/2016 09:06 PM    Appearance CLEAR 08/12/2016 09:06 PM    Specific gravity 1.017 08/12/2016 09:06 PM    Specific gravity 1.010 07/11/2016 12:44 PM    pH (UA) 7.0 08/12/2016 09:06 PM    Protein 300 (A) 08/12/2016 09:06 PM    Glucose NEGATIVE  08/12/2016 09:06 PM    Ketone TRACE (A) 08/12/2016 09:06 PM    Bilirubin NEGATIVE  07/11/2016 12:44 PM    Urobilinogen 1.0 08/12/2016 09:06 PM    Nitrites NEGATIVE  08/12/2016 09:06 PM    Leukocyte Esterase NEGATIVE  08/12/2016 09:06 PM    Epithelial cells MODERATE (A) 08/12/2016 09:06 PM    Bacteria 1+ (A) 08/12/2016 09:06 PM    WBC 0-4 08/12/2016 09:06 PM    RBC 0-5 08/12/2016 09:06 PM         Medications Reviewed:     Current Facility-Administered Medications   Medication Dose Route Frequency    apixaban (ELIQUIS) tablet 5 mg  5 mg Oral BID    0.9% sodium chloride infusion 250 mL  250 mL IntraVENous PRN    HYDROmorphone (DILAUDID) injection 0.5 mg  0.5 mg IntraVENous Q3H PRN    glucose chewable tablet 16 g  4 Tab Oral PRN    dextrose (D50W) injection syrg 12.5-25 g  12.5-25 g IntraVENous PRN    glucagon (GLUCAGEN) injection 1 mg  1 mg IntraMUSCular PRN    epoetin pia (EPOGEN;PROCRIT) 14,000 Units  14,000 Units SubCUTAneous DIALYSIS TUE, THU & SAT    insulin lispro (HUMALOG) injection   SubCUTAneous AC&HS    predniSONE (DELTASONE) tablet 5 mg  5 mg Oral DAILY    pantoprazole (PROTONIX) tablet 40 mg  40 mg Oral ACB    allopurinol (ZYLOPRIM) tablet 100 mg  100 mg Oral DAILY AFTER BREAKFAST    cyanocobalamin (VITAMIN B12) tablet 2,500 mcg  2,500 mcg Oral DAILY    hydroxychloroquine (PLAQUENIL) tablet 200 mg  200 mg Oral BID    albuterol (PROVENTIL VENTOLIN) nebulizer solution 2.5 mg  2.5 mg Nebulization Q4H PRN    azaTHIOprine (IMURAN) tablet 50 mg  50 mg Oral DAILY AFTER BREAKFAST    gemfibrozil (LOPID) tablet 300 mg  300 mg Oral DAILY    oxyCODONE IR (ROXICODONE) tablet 2.5-5 mg  2.5-5 mg Oral Q6H PRN    amitriptyline (ELAVIL) tablet 25 mg  25 mg Oral QHS    sodium chloride (NS) flush 5-10 mL  5-10 mL IntraVENous Q8H    sodium chloride (NS) flush 5-10 mL  5-10 mL IntraVENous PRN    ondansetron (ZOFRAN) injection 4 mg  4 mg IntraVENous Q4H PRN    piperacillin-tazobactam (ZOSYN) 3.375 g in 0.9% sodium chloride (MBP/ADV) 100 mL  3.375 g IntraVENous Q12H    arformoterol (BROVANA) neb solution 15 mcg  15 mcg Nebulization BID RT    And    budesonide (PULMICORT) 500 mcg/2 ml nebulizer suspension  500 mcg Nebulization BID RT     ______________________________________________________________________  EXPECTED LENGTH OF STAY: 4d 21h  ACTUAL LENGTH OF STAY:          5                 Trent Cleary MD

## 2018-03-16 NOTE — PROGRESS NOTES
Adriano Larios  is a 29 y. o.female  with a history of SLE, lupus nephritis leading to ESRD //HTN/ treated for candida peritonitis in Dec 2017  Is admitted with abdominal pain and fever since last Thursday  Pt was in [facility] in Dec 2017 for candida peritonitis and PD was stopped and she was started on HD thru a catheter. She took 4 weeks of fluconazole and was doing fine She had a graft placed on the rt arm on 1/19/19 - She developed fever and abdominal pain on 3/6 and blood culture sent from the catheter at dialysis center- The catheter was removed on 3/7 and the graft was accessed last week-   She was given IV gent  at the dialysis center.  As the abdominal pain was getting worse she came to the ED on 3/11 and was admitted  CT abdomen showed Large amount of free intraperitoneal fluid that is  loculated.         Multiple hospitalizations in the past few months  OCt 14-17 /2017 for left lower lobe pneumonia/effusion- was found to have PE on the rt side  10/27-10/30 possible pneumonia- same infiltrate left lower lobe- sent on augmentin     11/26-/11/28 for b/l leg swelling and abdominal swelling     12/12/17-12/22/17- fungal peritonitis- treated with fluconazole for 4 weeks     She had  peritoneal dialysis since 2015- had cath placed in 2015 until it was removed in Dec 2017      subjective  Feeling better  Still having lot of drainage from the tube  Objective:   VITALS:    Patient Vitals for the past 12 hrs:   Temp Pulse Resp BP SpO2   03/16/18 1810 98.7 °F (37.1 °C) 78 18 (!) 143/98 94 %   03/16/18 1607 98.1 °F (36.7 °C) 88 20 138/88 -   03/16/18 1145 98.9 °F (37.2 °C) 98 20 132/78 95 %   03/16/18 1120 - - - 128/85 94 %   03/16/18 0729 - - - - 97 %   PHYSICAL EXAM:   General:                   looks betetr Chronically ill ,weak  Lungs:                       B/l air entry     Heart:                                  s1s2  Abdomen:                  Drain -purulent reddish fluid  Less tender  Extremities: Rt arm graft   Skin:                                    Dry skin- striae over arms/abdomen  Lymph:                      Cervical, supraclavicular normal.  Neurologic:                Grossly non-focal     Pertinent Labs  Wbc 2.5>6.4   Hb 8.4  PLT 91  Lactate 1.5  Albumin 1.5  Peritoneal fluid- 3524 WBC ( 85% N)  E.coli     IMAGING RESULTS:       Impression/Recommendation  33 yr female with SLE/ lupus nephritis/HTN -h/o peritoneal dialysis until Dec 2017 /Candida peritonitis in Dec 2017/ treated with 4 weeks of fluconazole     Admitted with abdominal pain and fever of  5 days     E.coli  peritonitis with e.coli bacteremia  Loculated peritoneal fluid- has a drain- repeat imaging before removing drain to  make sure all the pockets are drained   The susceptibility  profile  of the e.coli in blood is different from peritoneal fluid-The blood e.coli is resistant to levaquin  As she is dropping platelet will Dc zosyn ans start cefepime        Recently treated candida peritonitis in Dec 2017- that time she was on PD     SLE-on imuran/plaquenil and prednisone   may consider holdign imuran to minimize immune suppressive state with the underlying  Infection       Discussed with patient   ID will follow her peripherally this weekend  Call  on call ID if needed thru [facility]

## 2018-03-16 NOTE — PROGRESS NOTES
Problem: Falls - Risk of  Goal: *Absence of Falls  Document Alex Fall Risk and appropriate interventions in the flowsheet.    Outcome: Progressing Towards Goal  Fall Risk Interventions:  Mobility Interventions: Patient to call before getting OOB         Medication Interventions: Patient to call before getting OOB, Teach patient to arise slowly    Elimination Interventions: Call light in reach, Patient to call for help with toileting needs             Comments: Call bell in reach room clutter free

## 2018-03-16 NOTE — ROUTINE PROCESS
TRANSFER - IN REPORT:    Verbal report received from Charles(name) on [facility]  being received from obs  (unit) for routine progression of care      Report consisted of patients Situation, Background, Assessment and   Recommendations(SBAR). Information from the following report(s) SBAR and Kardex was reviewed with the receiving nurse. Opportunity for questions and clarification was provided. Assessment completed upon patients arrival to unit and care assumed.

## 2018-03-16 NOTE — PROGRESS NOTES
TRANSFER - OUT REPORT:    Verbal report given to mike(name) on Vandana Varghese  being transferred to [facility](unit) for routine progression of care       Report consisted of patients Situation, Background, Assessment and   Recommendations(SBAR). Information from the following report(s) Kardex, ED Summary, Procedure Summary, Intake/Output, MAR and Recent Results was reviewed with the receiving nurse. Lines:   Peripheral IV 03/14/18 Left;Mid Forearm (Active)   Site Assessment Clean, dry, & intact 3/16/2018  2:16 PM   Phlebitis Assessment 0 3/16/2018  2:16 PM   Infiltration Assessment 0 3/16/2018  2:16 PM   Dressing Status Clean, dry, & intact 3/16/2018  2:16 PM   Dressing Type Transparent 3/16/2018  2:16 PM   Hub Color/Line Status Blue;Capped 3/16/2018  3:15 AM   Action Taken Open ports on tubing capped 3/15/2018  5:18 PM   Alcohol Cap Used Yes 3/16/2018  2:16 PM        Opportunity for questions and clarification was provided.       Patient transported with:   [employer]

## 2018-03-16 NOTE — PROGRESS NOTES
Patient name: Rodrick Quintana  MRN: [id number]    Nephrology Progress note:    Assessment:  ESRD: on HD TTS  E. Coli Bacteremia (Hard copy placed in chart): source intra-abd source/ E. Coli peritonitis. IV zosyn. Repeat Bld Cx 3/11-> Neg. 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 Not sure exactly how much its draining-> I/Os not accurate-> discussed with RN    ROS:   No nausea, no vomiting  No chest pain, no shortness of breath    Exam:  Visit Vitals    /85 (BP 1 Location: Left arm, BP Patient Position: Supine; At rest)    Pulse 98    Temp 98.9 °F (37.2 °C)    Resp 20    Ht 5' 5\" (1.651 m)    Wt 65 kg (143 lb 4.8 oz)    SpO2 95%    BMI 23.85 kg/m2     Wt Readings from Last 3 Encounters:   03/16/18 65 kg (143 lb 4.8 oz)   03/05/18 68 kg (150 lb)   01/29/18 69.4 kg (153 lb)       Intake/Output Summary (Last 24 hours) at 03/16/18 1358  Last data filed at 03/16/18 1356   Gross per 24 hour   Intake              656 ml   Output             3694 ml   Net            -3038 ml       Gen: NAD/Frail appearance  HEENT:  AT/NC  Lungs/Chest wall: Clear. Cardiovascular: Regular rate, normal rhythm.   Abdomen/: Soft, Peritoneal drain  Ext: No peripheral edema. R AVF  CNS: alert and awake.  Answers appropriately      Current Facility-Administered Medications   Medication Dose Route Frequency Last Dose    apixaban (ELIQUIS) tablet 5 mg  5 mg Oral BID 5 mg at 03/16/18 2186    0.9% sodium chloride infusion 250 mL  250 mL IntraVENous PRN      HYDROmorphone (DILAUDID) injection 0.5 mg  0.5 mg IntraVENous Q3H PRN 0.5 mg at 03/16/18 0311    glucose chewable tablet 16 g  4 Tab Oral PRN      dextrose (D50W) injection syrg 12.5-25 g  12.5-25 g IntraVENous PRN      glucagon (GLUCAGEN) injection 1 mg  1 mg IntraMUSCular PRN      epoetin pia (EPOGEN;PROCRIT) 14,000 Units  14,000 Units SubCUTAneous DIALYSIS TUE, THU & SAT 14,000 Units at 03/15/18 2125    insulin lispro (HUMALOG) injection   SubCUTAneous AC&HS Stopped at 03/12/18 2200    predniSONE (DELTASONE) tablet 5 mg  5 mg Oral DAILY 5 mg at 03/16/18 9944    pantoprazole (PROTONIX) tablet 40 mg  40 mg Oral ACB 40 mg at 03/16/18 0701    allopurinol (ZYLOPRIM) tablet 100 mg  100 mg Oral DAILY AFTER BREAKFAST 100 mg at 03/16/18 5435    cyanocobalamin (VITAMIN B12) tablet 2,500 mcg  2,500 mcg Oral DAILY 2,500 mcg at 03/16/18 0768    hydroxychloroquine (PLAQUENIL) tablet 200 mg  200 mg Oral  mg at 03/16/18 0821    albuterol (PROVENTIL VENTOLIN) nebulizer solution 2.5 mg  2.5 mg Nebulization Q4H PRN      azaTHIOprine (IMURAN) tablet 50 mg  50 mg Oral DAILY AFTER BREAKFAST 50 mg at 03/16/18 0824    gemfibrozil (LOPID) tablet 300 mg  300 mg Oral DAILY 300 mg at 03/16/18 9816    oxyCODONE IR (ROXICODONE) tablet 2.5-5 mg  2.5-5 mg Oral Q6H PRN 5 mg at 03/16/18 1209    amitriptyline (ELAVIL) tablet 25 mg  25 mg Oral QHS 25 mg at 03/15/18 2124    sodium chloride (NS) flush 5-10 mL  5-10 mL IntraVENous Q8H 10 mL at 03/16/18 0600    sodium chloride (NS) flush 5-10 mL  5-10 mL IntraVENous PRN 10 mL at 03/13/18 2053    ondansetron (ZOFRAN) injection 4 mg  4 mg IntraVENous Q4H PRN      piperacillin-tazobactam (ZOSYN) 3.375 g in 0.9% sodium chloride (MBP/ADV) 100 mL  3.375 g IntraVENous Q12H 3.375 g at 03/16/18 0318    arformoterol (BROVANA) neb solution 15 mcg  15 mcg Nebulization BID RT 15 mcg at 03/16/18 0726    And    budesonide (PULMICORT) 500 mcg/2 ml nebulizer suspension  500 mcg Nebulization BID  mcg at 03/16/18 0726       Labs/Data:    Lab Results   Component Value Date/Time    WBC 6.4 03/16/2018 03:35 AM    Hemoglobin (POC) 7.8 (L) 01/19/2018 12:30 PM    HGB 8.7 (L) 03/16/2018 03:35 AM    Hematocrit (POC) 23 (L) 01/19/2018 12:30 PM    HCT 27.6 (L) 03/16/2018 03:35 AM    PLATELET 91 (L) 19/31/2296 03:35 AM    MCV 81.4 03/16/2018 03:35 AM       Lab Results   Component Value Date/Time    Sodium 140 03/16/2018 03:35 AM    Potassium 3.1 (L) 03/16/2018 03:35 AM    Chloride 101 03/16/2018 03:35 AM    CO2 31 03/16/2018 03:35 AM    Anion gap 8 03/16/2018 03:35 AM    Glucose 85 03/16/2018 03:35 AM    BUN 18 03/16/2018 03:35 AM    Creatinine 3.01 (H) 03/16/2018 03:35 AM    BUN/Creatinine ratio 6 (L) 03/16/2018 03:35 AM    GFR est AA 22 (L) 03/16/2018 03:35 AM    GFR est non-AA 18 (L) 03/16/2018 03:35 AM    Calcium 7.9 (L) 03/16/2018 03:35 AM       Patient seen and examined. Chart reviewed. Labs, data and other pertinent notes reviewed in last 24 hrs.     Discussed with patient and RN    Signed by:  Te Rashid MD

## 2018-03-17 ENCOUNTER — APPOINTMENT (OUTPATIENT)
Dept: CT IMAGING | Age: 33
DRG: 853 | End: 2018-03-17
Attending: INTERNAL MEDICINE
Payer: MEDICARE

## 2018-03-17 LAB
ALBUMIN SERPL-MCNC: 1.1 G/DL (ref 3.5–5)
ALBUMIN/GLOB SERPL: 0.2 {RATIO} (ref 1.1–2.2)
ALP SERPL-CCNC: 63 U/L (ref 45–117)
ALT SERPL-CCNC: 8 U/L (ref 12–78)
ANION GAP SERPL CALC-SCNC: 8 MMOL/L (ref 5–15)
AST SERPL-CCNC: 30 U/L (ref 15–37)
BILIRUB SERPL-MCNC: 0.4 MG/DL (ref 0.2–1)
BUN SERPL-MCNC: 26 MG/DL (ref 6–20)
BUN/CREAT SERPL: 6 (ref 12–20)
CALCIUM SERPL-MCNC: 7.9 MG/DL (ref 8.5–10.1)
CHLORIDE SERPL-SCNC: 102 MMOL/L (ref 97–108)
CO2 SERPL-SCNC: 28 MMOL/L (ref 21–32)
CREAT SERPL-MCNC: 4.19 MG/DL (ref 0.55–1.02)
ERYTHROCYTE [DISTWIDTH] IN BLOOD BY AUTOMATED COUNT: 17.4 % (ref 11.5–14.5)
GLOBULIN SER CALC-MCNC: 4.7 G/DL (ref 2–4)
GLUCOSE BLD STRIP.AUTO-MCNC: 259 MG/DL (ref 65–100)
GLUCOSE BLD STRIP.AUTO-MCNC: 50 MG/DL (ref 65–100)
GLUCOSE BLD STRIP.AUTO-MCNC: 56 MG/DL (ref 65–100)
GLUCOSE BLD STRIP.AUTO-MCNC: 58 MG/DL (ref 65–100)
GLUCOSE BLD STRIP.AUTO-MCNC: 61 MG/DL (ref 65–100)
GLUCOSE BLD STRIP.AUTO-MCNC: 74 MG/DL (ref 65–100)
GLUCOSE BLD STRIP.AUTO-MCNC: 84 MG/DL (ref 65–100)
GLUCOSE BLD STRIP.AUTO-MCNC: 88 MG/DL (ref 65–100)
GLUCOSE BLD STRIP.AUTO-MCNC: 96 MG/DL (ref 65–100)
GLUCOSE SERPL-MCNC: 77 MG/DL (ref 65–100)
HCT VFR BLD AUTO: 28.2 % (ref 35–47)
HGB BLD-MCNC: 8.8 G/DL (ref 11.5–16)
MAGNESIUM SERPL-MCNC: 1.9 MG/DL (ref 1.6–2.4)
MCH RBC QN AUTO: 26 PG (ref 26–34)
MCHC RBC AUTO-ENTMCNC: 31.2 G/DL (ref 30–36.5)
MCV RBC AUTO: 83.4 FL (ref 80–99)
NRBC # BLD: 0 K/UL (ref 0–0.01)
NRBC BLD-RTO: 0 PER 100 WBC
PHOSPHATE SERPL-MCNC: 2.9 MG/DL (ref 2.6–4.7)
PLATELET # BLD AUTO: 105 K/UL (ref 150–400)
PMV BLD AUTO: 11.3 FL (ref 8.9–12.9)
POTASSIUM SERPL-SCNC: 3.7 MMOL/L (ref 3.5–5.1)
PROT SERPL-MCNC: 5.8 G/DL (ref 6.4–8.2)
RBC # BLD AUTO: 3.38 M/UL (ref 3.8–5.2)
SERVICE CMNT-IMP: ABNORMAL
SERVICE CMNT-IMP: NORMAL
SODIUM SERPL-SCNC: 138 MMOL/L (ref 136–145)
WBC # BLD AUTO: 6.2 K/UL (ref 3.6–11)

## 2018-03-17 PROCEDURE — 83735 ASSAY OF MAGNESIUM: CPT | Performed by: INTERNAL MEDICINE

## 2018-03-17 PROCEDURE — 74011250636 HC RX REV CODE- 250/636: Performed by: HOSPITALIST

## 2018-03-17 PROCEDURE — 94664 DEMO&/EVAL PT USE INHALER: CPT

## 2018-03-17 PROCEDURE — 85027 COMPLETE CBC AUTOMATED: CPT | Performed by: INTERNAL MEDICINE

## 2018-03-17 PROCEDURE — 74011250637 HC RX REV CODE- 250/637: Performed by: HOSPITALIST

## 2018-03-17 PROCEDURE — 90935 HEMODIALYSIS ONE EVALUATION: CPT

## 2018-03-17 PROCEDURE — 74176 CT ABD & PELVIS W/O CONTRAST: CPT

## 2018-03-17 PROCEDURE — 74011636637 HC RX REV CODE- 636/637: Performed by: HOSPITALIST

## 2018-03-17 PROCEDURE — 36415 COLL VENOUS BLD VENIPUNCTURE: CPT | Performed by: INTERNAL MEDICINE

## 2018-03-17 PROCEDURE — 74011000258 HC RX REV CODE- 258: Performed by: INTERNAL MEDICINE

## 2018-03-17 PROCEDURE — 74011000250 HC RX REV CODE- 250: Performed by: HOSPITALIST

## 2018-03-17 PROCEDURE — 74011250636 HC RX REV CODE- 250/636: Performed by: INTERNAL MEDICINE

## 2018-03-17 PROCEDURE — 94640 AIRWAY INHALATION TREATMENT: CPT

## 2018-03-17 PROCEDURE — 84100 ASSAY OF PHOSPHORUS: CPT | Performed by: INTERNAL MEDICINE

## 2018-03-17 PROCEDURE — 80053 COMPREHEN METABOLIC PANEL: CPT | Performed by: INTERNAL MEDICINE

## 2018-03-17 PROCEDURE — 82962 GLUCOSE BLOOD TEST: CPT

## 2018-03-17 PROCEDURE — 65660000000 HC RM CCU STEPDOWN

## 2018-03-17 RX ADMIN — OXYCODONE HYDROCHLORIDE 5 MG: 5 TABLET ORAL at 11:57

## 2018-03-17 RX ADMIN — BUDESONIDE 500 MCG: 0.5 INHALANT RESPIRATORY (INHALATION) at 07:42

## 2018-03-17 RX ADMIN — AMITRIPTYLINE HYDROCHLORIDE 25 MG: 10 TABLET, FILM COATED ORAL at 22:40

## 2018-03-17 RX ADMIN — PREDNISONE 5 MG: 5 TABLET ORAL at 09:09

## 2018-03-17 RX ADMIN — Medication 10 ML: at 06:12

## 2018-03-17 RX ADMIN — Medication 2500 MCG: at 09:09

## 2018-03-17 RX ADMIN — DEXTROSE MONOHYDRATE 12.5 G: 500 INJECTION PARENTERAL at 23:06

## 2018-03-17 RX ADMIN — PANTOPRAZOLE SODIUM 40 MG: 40 TABLET, DELAYED RELEASE ORAL at 07:24

## 2018-03-17 RX ADMIN — HYDROMORPHONE HYDROCHLORIDE 0.5 MG: 2 INJECTION INTRAMUSCULAR; INTRAVENOUS; SUBCUTANEOUS at 06:11

## 2018-03-17 RX ADMIN — SODIUM CHLORIDE 1 G: 900 INJECTION, SOLUTION INTRAVENOUS at 22:40

## 2018-03-17 RX ADMIN — AZATHIOPRINE 50 MG: 50 TABLET ORAL at 11:57

## 2018-03-17 RX ADMIN — ALLOPURINOL 100 MG: 100 TABLET ORAL at 09:13

## 2018-03-17 RX ADMIN — HYDROXYCHLOROQUINE SULFATE 200 MG: 200 TABLET, FILM COATED ORAL at 19:44

## 2018-03-17 RX ADMIN — ARFORMOTEROL TARTRATE 15 MCG: 15 SOLUTION RESPIRATORY (INHALATION) at 07:42

## 2018-03-17 RX ADMIN — Medication 10 ML: at 13:04

## 2018-03-17 RX ADMIN — APIXABAN 5 MG: 5 TABLET, FILM COATED ORAL at 09:09

## 2018-03-17 RX ADMIN — ONDANSETRON 4 MG: 2 INJECTION INTRAMUSCULAR; INTRAVENOUS at 19:39

## 2018-03-17 RX ADMIN — ARFORMOTEROL TARTRATE 15 MCG: 15 SOLUTION RESPIRATORY (INHALATION) at 22:25

## 2018-03-17 RX ADMIN — OXYCODONE HYDROCHLORIDE 5 MG: 5 TABLET ORAL at 06:11

## 2018-03-17 RX ADMIN — GEMFIBROZIL 300 MG: 600 TABLET ORAL at 09:09

## 2018-03-17 RX ADMIN — BUDESONIDE 500 MCG: 0.5 INHALANT RESPIRATORY (INHALATION) at 22:25

## 2018-03-17 RX ADMIN — APIXABAN 5 MG: 5 TABLET, FILM COATED ORAL at 19:44

## 2018-03-17 RX ADMIN — HYDROMORPHONE HYDROCHLORIDE 0.5 MG: 2 INJECTION INTRAMUSCULAR; INTRAVENOUS; SUBCUTANEOUS at 13:04

## 2018-03-17 RX ADMIN — Medication 10 ML: at 22:41

## 2018-03-17 RX ADMIN — OXYCODONE HYDROCHLORIDE 5 MG: 5 TABLET ORAL at 23:12

## 2018-03-17 RX ADMIN — HYDROXYCHLOROQUINE SULFATE 200 MG: 200 TABLET, FILM COATED ORAL at 09:09

## 2018-03-17 RX ADMIN — ERYTHROPOIETIN 14000 UNITS: 10000 INJECTION, SOLUTION INTRAVENOUS; SUBCUTANEOUS at 22:50

## 2018-03-17 NOTE — PROGRESS NOTES
Problem: Falls - Risk of  Goal: *Absence of Falls  Document Alex Fall Risk and appropriate interventions in the flowsheet.    Outcome: Progressing Towards Goal  Fall Risk Interventions:  Mobility Interventions: Patient to call before getting OOB         Medication Interventions: Patient to call before getting OOB    Elimination Interventions: Call light in reach, Patient to call for help with toileting needs

## 2018-03-17 NOTE — DIALYSIS
[facility]  [phone]    Vitals   Pre   Post   Assessment   Pre   Post     Temp  Temp: 98.4 °F (36.9 °C) (03/17/18 1615)  98.5 LOC  Alert and oriented No change   HR   Pulse (Heart Rate): 98 (03/17/18 1615) 103 Lungs   CTAB and no c/o SOB No change   B/P   BP: (!) 138/103 (03/17/18 1615) 165/124 Cardiac   S1S2 and no c/o CP No change   Resp   Resp Rate: 16 (03/17/18 1615) 16 Skin   Warm and dry No change   Pain level  Pain Intensity 1: 8 (03/17/18 1304) No complaints Edema  Generalized mild Achieved 0.8 kg fluid removal   Orders:    Duration:   Start:   1615       End:   1915 Total:   3.0   Dialyzer:   Dialyzer/Set Up Inspection: Adán Marte (03/17/18 1615)   K Bath:   Dialysate K (mEq/L): 4 (03/17/18 1615)   Ca Bath:   Dialysate CA (mEq/L): 2.5 (03/17/18 1615)   Na/Bicarb:   Dialysate NA (mEq/L): 140 (03/17/18 1615)   Target Fluid Removal:   Goal/Amount of Fluid to Remove (mL): 1000 mL (03/17/18 1615)   Access     Type & Location:   PETER AVG w + thrill/bruit   Labs     Obtained/Reviewed   Critical Results Called   Date when labs were drawn-  Hgb-    HGB   Date Value Ref Range Status   03/17/2018 8.8 (L) 11.5 - 16.0 g/dL Final     K-    Potassium   Date Value Ref Range Status   03/17/2018 3.7 3.5 - 5.1 mmol/L Final     Ca-   Calcium   Date Value Ref Range Status   03/17/2018 7.9 (L) 8.5 - 10.1 MG/DL Final     Bun-   BUN   Date Value Ref Range Status   03/17/2018 26 (H) 6 - 20 MG/DL Final     Creat-   Creatinine   Date Value Ref Range Status   03/17/2018 4.19 (H) 0.55 - 1.02 MG/DL Final     Comment:     INVESTIGATED PER DELTA CHECK PROTOCOL        Medications/ Blood Products Given     Name   Dose   Route and Time           None by HD          Blood Volume Processed (BVP):    64.0 Net Fluid   Removed:  800 mL   Comments   Time Out Done: yes at 1600  Primary Nurse Rpt Pre: Jesus Lofton RN  Primary Nurse Rpt Post: Jesus Rater RN  Pt Education: aseptic technique  Care Plan: pt will attend all dialysis tx  Tx Summary: set up machine per order and policy; pt arrived at or about 1600 and was assessed prior to tx; found to have a PETER AVG w + thrill/bruit and aseptically cannulated w 15 ga x 2; tx initiated w/o issue to BFR of 450,  and tx to run 3 hours and attempting 1 kg fluid removal as tolerated by pt; pt completed her entire tx and achieved 800 mL fluid removal w/o issue, all possible blood rinsed back and aseptically de-cannulated and hemostasis achieved w/o prolonged bleeding; pressures dressings placed and CDI; assisted primary RN to transport pt and bed back to room and pt was stable at this time. Water and dialysate testing:    Residual TASIA test neg prior to : yes  Residual bleach test neg prior to : yes  Total Chlorine and chloramines < 0.1 at 1515  K2 passed test at [facility] conductivity: 14.0 TCD, 14.5 machine, 14.2 meter; pH 7.2       Mid tx conductivity 14.2 machine, 14.2 meter, 7.2 pH    Supplies information:    Revaclear dialyzer lot # [id number]; exp 10/1/2019  Nipro ECC lot # [id number]; exp 6/30/2022  RenalPure acid concentrate 4251 lot # [id number]; exp 2/27/2020  SteriLyte bicarbonate lot # [id number]; exp 8/19/2019  0.9% Normal Saline lot # [id number]; exp 1/1/2020      Admiting Diagnosis: Abdominal pain / PD catheter infection  Pt's previous clinic- [facility]  Consent signed - Informed Consent Verified: Yes (03/17/18 1615)  [facility] Consent - yes  Hepatitis Status- AB 10 as of 1/13/2018  Machine #- Machine Number: [id number] (03/17/18 1615)  Telemetry status- n/a  Pre-dialysis wt. - Pre-Dialysis Weight: 61 kg (134 lb 7.7 oz) (03/17/18 1615) numerical 0-10

## 2018-03-17 NOTE — ROUTINE PROCESS
Primary Nurse Sundar Chang RN and Keyanna RN performed a dual skin assessment on this patient No impairment noted  Vikash score is 20

## 2018-03-17 NOTE — PROGRESS NOTES
I returned page from nurse who reports patient's diastolic BP high. Per chart review systolic BP are not markedly high. Patient has h/o hypotension. As such, I would have concern for trying to aggressively treat this current BP. Especially as she have hemodialysis tomorrow, I would not want her BP to be too low. Informed nurse to repeat BP in 2 hours to monitor.

## 2018-03-17 NOTE — PROGRESS NOTES
Hospitalist Progress Note  Joli Lennox, MD  Answering service: [phone] OR [phone] from in house phone  Cell: [phone]      Date of Service:  3/17/2018  NAME:  Pradeep Thompson  :  1985  MRN:  [id number]      Admission Summary: This is a 29-year-old female with multiple medical problems including SLE, end-stage renal disease (on hemodialysis, TTS), pulmonary embolism, on Eliquis (), hyperlipidemia, hypertension, recent Candida peritonitis, chronic bilateral pain. She comes over here because of abdominal pain since last 4 or 5 days.     The patient claims that last Wednesday, that is about 4 days ago, the patient started having fever and at that time, she noted that her temperature was 100.4 degrees Fahrenheit. At the same time, she also started having abdominal pain. She describes her pain as throughout the whole abdomen, sharp 7/10 to 9/10 in intensity off and on, nonradiating, slightly gets better with pain medication, but there was no triggering factor whatsoever. In any case, the next day the patient went for dialysis. She claims that over there, they did the blood cultures and put her on antibiotics. She was told that in the blood culture, there was a growth of bacteria, but she does not remember the name of the bacteria. She also does not remember the name of the antibiotic as well. Please also note that recently the patient was switched to hemodialysis and subsequently received a graft. Last Wednesday, her dialysis catheter was taken out. When the patient's abdominal pain continued then today she decided to come into the hospital.  The patient claims that she did not have any other symptoms including chest pain, shortness of breath, cough, nausea, vomiting, headache, dizziness.   No changes in bowel movements.       Interval history / Subjective:   F/u Abdominal pain   Abdominal pain and SOB improved after paracentesis  Feeling better  Assessment & Plan:     Acute peritonitis  -recent candida peritonitis  -CT abd 3/11 Large amount of free intraperitoneal fluid appears somewhat loculated  -Follow cultures, blood culture 3/11 unremarkable. Peritoneal fluid heavy E coli  -Was on Vanc/Zosyn/Fluconazole. Now Vanc/FLuconazole discontinued. Continue Cefepime  -s/p paracentesis, follow studies. With drain in place, draining thick fluid. - Repeat CT today to check on loculations, with peritoneal fluid analysis. E coli bacteremia/Sepsis   that the patient was found to have E coli bacteremia at HD    Hypotension  -now resolved  -Was on stress steroids  -Now on regular oral steroids    Thrombocytopenia   -Monitor closely  -Switched Zosyn to cefepime. Bilateral pedal edema  -Dopplers negative for DVT    SLE  -continue home meds  -D/C'd Imuran, allopurinol by nephro with acute infection and thrombocytopenia. History of PE  -on Eliquis since 2012  -Appreciate discussion with PMD 3/13. The patient was seen by hematology in 2013 but seems was lost to follow up. I think at that time the plan was to stop anticoagulation if her SLE is stable. Spoke to Dr Lauren Helm, he feels that the patient can come off Eliquis. His records do not suggest any recurrence of DVT/PE. Spoke to Dr Oswaldo Garcia, he has not seen the patient since >1 yr. If antiphospholipid antibodies negative then the patient can come off Eliquis  -10/17 V/Q high probability for PE  -Per oncology the patient should be continued to Eliquis indefinitely    ESRD  -on HD TTS    Anemia  -of chronic disease  -Transfused 2 units PRBC with HD 3/15    Severe hepatic steatosis  -monitor    HTN  -stable    Severe protein calorie malnutrition  -Consult nutrition  -nutritional supp    Appreciate Nephro, ID, surgery input.     Renal diet    Code status: FULL CODE  DVT prophylaxis: Eliquis  PTA: home    Plan: Follow ID, nephrology, surgery    Care Plan discussed with: Patient/Family  Disposition: Home w/Family     Hospital Problems  Date Reviewed: 3/11/2018          Codes Class Noted POA    * (Principal)Peritonitis [facility]) ICD-10-CM: K65.9  ICD-9-CM: 567.9  3/11/2018 Yes                Review of Systems:   A comprehensive review of systems was negative except for that written in the HPI. Vital Signs:    Last 24hrs VS reviewed since prior progress note. Most recent are:  Visit Vitals    BP (!) 126/103 (BP 1 Location: Left arm, BP Patient Position: At rest)    Pulse (!) 106    Temp 98.8 °F (37.1 °C)    Resp 18    Ht 5' 5\" (1.651 m)    Wt 61 kg (134 lb 6.4 oz)    SpO2 100%    BMI 22.37 kg/m2         Intake/Output Summary (Last 24 hours) at 03/17/18 1602  Last data filed at 03/17/18 9839   Gross per 24 hour   Intake              200 ml   Output              350 ml   Net             -150 ml        Physical Examination:             Constitutional:  No acute distress, cooperative but frail   ENT:  Oral mucous moist, oropharynx benign. Neck supple,    Resp:  CTA bilaterally. No wheezing/rhonchi/rales. CV:  Regular rhythm, normal rate, no murmurs, gallops, rubs    GI:  Soft, non distended, tender at drain site. normoactive bowel sounds, no hepatosplenomegaly     Musculoskeletal:   No edema , warm, R AVF    Neurologic:  Moves all extremities.   AAOx3,             Data Review:    Review and/or order of clinical lab test      Labs:     Recent Labs      03/17/18   0729  03/16/18   0335   WBC  6.2  6.4   HGB  8.8*  8.7*   HCT  28.2*  27.6*   PLT  105*  91*     Recent Labs      03/17/18   0729  03/16/18   0335  03/15/18   0456   NA  138  140  137  138   K  3.7  3.1*  3.2*  3.2*   CL  102  101  98  98   CO2  28  31  30  31   BUN  26*  18  31*  31*   CREA  4.19*  3.01*  4.34*  4.30*   GLU  77  85  81  87   CA  7.9*  7.9*  7.8*  8.0*   MG  1.9  1.8  1.8   PHOS  2.9  2.1*  3.0     Recent Labs      03/17/18   0729  03/16/18   0335  03/15/18   0456   SGOT  30  24  20   ALT  8* <6*  <6*   AP  63  64  61   TBILI  0.4  0.5  0.4   TP  5.8*  5.7*  5.7*   ALB  1.1*  1.1*  1.1*   GLOB  4.7*  4.6*  4.6*     No results for input(s): INR, PTP, APTT in the last 72 hours. No lab exists for component: INREXT, INREXT   Recent Labs      03/15/18   1032   TIBC  52*   PSAT  42   FERR  548*      Lab Results   Component Value Date/Time    Folate 4.1 (L) 03/15/2018 10:32 AM      No results for input(s): PH, PCO2, PO2 in the last 72 hours. No results for input(s): CPK, CKNDX, TROIQ in the last 72 hours.     No lab exists for component: CPKMB  Lab Results   Component Value Date/Time    Cholesterol, total 117 09/25/2015 02:02 PM    HDL Cholesterol 31 (L) 09/25/2015 02:02 PM    LDL, calculated 57 09/25/2015 02:02 PM    Triglyceride 146 09/25/2015 02:02 PM    CHOL/HDL Ratio 7.7 (H) 05/31/2009 10:30 AM     Lab Results   Component Value Date/Time    Glucose (POC) 96 03/17/2018 11:28 AM    Glucose (POC) 88 03/17/2018 06:18 AM    Glucose (POC) 88 03/16/2018 11:31 PM    Glucose (POC) 73 03/16/2018 11:01 PM    Glucose (POC) 77 03/16/2018 10:45 PM     Lab Results   Component Value Date/Time    Color YELLOW/STRAW 08/12/2016 09:06 PM    Appearance CLEAR 08/12/2016 09:06 PM    Specific gravity 1.017 08/12/2016 09:06 PM    Specific gravity 1.010 07/11/2016 12:44 PM    pH (UA) 7.0 08/12/2016 09:06 PM    Protein 300 (A) 08/12/2016 09:06 PM    Glucose NEGATIVE  08/12/2016 09:06 PM    Ketone TRACE (A) 08/12/2016 09:06 PM    Bilirubin NEGATIVE  07/11/2016 12:44 PM    Urobilinogen 1.0 08/12/2016 09:06 PM    Nitrites NEGATIVE  08/12/2016 09:06 PM    Leukocyte Esterase NEGATIVE  08/12/2016 09:06 PM    Epithelial cells MODERATE (A) 08/12/2016 09:06 PM    Bacteria 1+ (A) 08/12/2016 09:06 PM    WBC 0-4 08/12/2016 09:06 PM    RBC 0-5 08/12/2016 09:06 PM         Medications Reviewed:     Current Facility-Administered Medications   Medication Dose Route Frequency    cefepime (MAXIPIME) 1 g in 0.9% sodium chloride (MBP/ADV) 50 mL ADV 1 g IntraVENous Q24H    apixaban (ELIQUIS) tablet 5 mg  5 mg Oral BID    0.9% sodium chloride infusion 250 mL  250 mL IntraVENous PRN    HYDROmorphone (DILAUDID) injection 0.5 mg  0.5 mg IntraVENous Q3H PRN    glucose chewable tablet 16 g  4 Tab Oral PRN    dextrose (D50W) injection syrg 12.5-25 g  12.5-25 g IntraVENous PRN    glucagon (GLUCAGEN) injection 1 mg  1 mg IntraMUSCular PRN    epoetin pia (EPOGEN;PROCRIT) 14,000 Units  14,000 Units SubCUTAneous DIALYSIS TUE, THU & SAT    insulin lispro (HUMALOG) injection   SubCUTAneous AC&HS    predniSONE (DELTASONE) tablet 5 mg  5 mg Oral DAILY    pantoprazole (PROTONIX) tablet 40 mg  40 mg Oral ACB    cyanocobalamin (VITAMIN B12) tablet 2,500 mcg  2,500 mcg Oral DAILY    hydroxychloroquine (PLAQUENIL) tablet 200 mg  200 mg Oral BID    albuterol (PROVENTIL VENTOLIN) nebulizer solution 2.5 mg  2.5 mg Nebulization Q4H PRN    gemfibrozil (LOPID) tablet 300 mg  300 mg Oral DAILY    oxyCODONE IR (ROXICODONE) tablet 2.5-5 mg  2.5-5 mg Oral Q6H PRN    amitriptyline (ELAVIL) tablet 25 mg  25 mg Oral QHS    sodium chloride (NS) flush 5-10 mL  5-10 mL IntraVENous Q8H    sodium chloride (NS) flush 5-10 mL  5-10 mL IntraVENous PRN    ondansetron (ZOFRAN) injection 4 mg  4 mg IntraVENous Q4H PRN    arformoterol (BROVANA) neb solution 15 mcg  15 mcg Nebulization BID RT    And    budesonide (PULMICORT) 500 mcg/2 ml nebulizer suspension  500 mcg Nebulization BID RT     ______________________________________________________________________  EXPECTED LENGTH OF STAY: 4d 21h  ACTUAL LENGTH OF STAY:          6                 Carolineramin Patton MD

## 2018-03-17 NOTE — PROGRESS NOTES
Patient name: Avi Meneses  MRN: [id number]    Nephrology Progress note:    Assessment:  ESRD: on HD TTS  E. Coli Bacteremia (Hard copy placed in chart): source intra-abd source/ E. Coli peritonitis. IV zosyn. Repeat Bld Cx 3/11-> Neg. 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     ROS:   No nausea, no vomiting  No chest pain, no shortness of breath    Exam:  Visit Vitals    BP (!) 126/103 (BP 1 Location: Left arm, BP Patient Position: At rest)    Pulse (!) 106    Temp 98.8 °F (37.1 °C)    Resp 18    Ht 5' 5\" (1.651 m)    Wt 61 kg (134 lb 6.4 oz)    SpO2 100%    BMI 22.37 kg/m2     Wt Readings from Last 3 Encounters:   03/17/18 61 kg (134 lb 6.4 oz)   03/05/18 68 kg (150 lb)   01/29/18 69.4 kg (153 lb)       Intake/Output Summary (Last 24 hours) at 03/17/18 1538  Last data filed at 03/17/18 0286   Gross per 24 hour   Intake              200 ml   Output              350 ml   Net             -150 ml       Gen: NAD/Frail appearance  HEENT:  AT/NC  Lungs/Chest wall: Clear.  Cardiovascular: Regular rate, normal rhythm. Abdomen/: Soft, Peritoneal drain  Ext:  No peripheral edema. R AVF  CNS: alert and awake.  Answers appropriately      Current Facility-Administered Medications   Medication Dose Route Frequency Last Dose    cefepime (MAXIPIME) 1 g in 0.9% sodium chloride (MBP/ADV) 50 mL ADV  1 g IntraVENous Q24H 1 g at 03/16/18 2027    apixaban (ELIQUIS) tablet 5 mg  5 mg Oral BID 5 mg at 03/17/18 0909    0.9% sodium chloride infusion 250 mL  250 mL IntraVENous PRN      HYDROmorphone (DILAUDID) injection 0.5 mg  0.5 mg IntraVENous Q3H PRN 0.5 mg at 03/17/18 1304    glucose chewable tablet 16 g  4 Tab Oral PRN 16 g at 03/16/18 2307    dextrose (D50W) injection syrg 12.5-25 g  12.5-25 g IntraVENous PRN      glucagon (GLUCAGEN) injection 1 mg  1 mg IntraMUSCular PRN      epoetin pia (EPOGEN;PROCRIT) 14,000 Units  14,000 Units SubCUTAneous DIALYSIS TUE, THU & SAT 14,000 Units at 03/15/18 2125    insulin lispro (HUMALOG) injection   SubCUTAneous AC&HS Stopped at 03/12/18 2200    predniSONE (DELTASONE) tablet 5 mg  5 mg Oral DAILY 5 mg at 03/17/18 0909    pantoprazole (PROTONIX) tablet 40 mg  40 mg Oral ACB 40 mg at 03/17/18 0724    allopurinol (ZYLOPRIM) tablet 100 mg  100 mg Oral DAILY AFTER BREAKFAST 100 mg at 03/17/18 0913    cyanocobalamin (VITAMIN B12) tablet 2,500 mcg  2,500 mcg Oral DAILY 2,500 mcg at 03/17/18 3322    hydroxychloroquine (PLAQUENIL) tablet 200 mg  200 mg Oral  mg at 03/17/18 0909    albuterol (PROVENTIL VENTOLIN) nebulizer solution 2.5 mg  2.5 mg Nebulization Q4H PRN      gemfibrozil (LOPID) tablet 300 mg  300 mg Oral DAILY 300 mg at 03/17/18 9235    oxyCODONE IR (ROXICODONE) tablet 2.5-5 mg  2.5-5 mg Oral Q6H PRN 5 mg at 03/17/18 1157    amitriptyline (ELAVIL) tablet 25 mg  25 mg Oral QHS 25 mg at 03/16/18 2302    sodium chloride (NS) flush 5-10 mL  5-10 mL IntraVENous Q8H 10 mL at 03/17/18 1304    sodium chloride (NS) flush 5-10 mL 5-10 mL IntraVENous PRN 10 mL at 03/13/18 2053    ondansetron (ZOFRAN) injection 4 mg  4 mg IntraVENous Q4H PRN 4 mg at 03/16/18 1819    arformoterol (BROVANA) neb solution 15 mcg  15 mcg Nebulization BID RT 15 mcg at 03/17/18 1045    And    budesonide (PULMICORT) 500 mcg/2 ml nebulizer suspension  500 mcg Nebulization BID  mcg at 03/17/18 2523       Labs/Data:    Lab Results   Component Value Date/Time    WBC 6.2 03/17/2018 07:29 AM    Hemoglobin (POC) 7.8 (L) 01/19/2018 12:30 PM    HGB 8.8 (L) 03/17/2018 07:29 AM    Hematocrit (POC) 23 (L) 01/19/2018 12:30 PM    HCT 28.2 (L) 03/17/2018 07:29 AM    PLATELET 351 (L) 93/51/3795 07:29 AM    MCV 83.4 03/17/2018 07:29 AM       Lab Results   Component Value Date/Time    Sodium 138 03/17/2018 07:29 AM    Potassium 3.7 03/17/2018 07:29 AM    Chloride 102 03/17/2018 07:29 AM    CO2 28 03/17/2018 07:29 AM    Anion gap 8 03/17/2018 07:29 AM    Glucose 77 03/17/2018 07:29 AM    BUN 26 (H) 03/17/2018 07:29 AM    Creatinine 4.19 (H) 03/17/2018 07:29 AM    BUN/Creatinine ratio 6 (L) 03/17/2018 07:29 AM    GFR est AA 15 (L) 03/17/2018 07:29 AM    GFR est non-AA 12 (L) 03/17/2018 07:29 AM    Calcium 7.9 (L) 03/17/2018 07:29 AM       Patient seen and examined. Chart reviewed. Labs, data and other pertinent notes reviewed in last 24 hrs.         Signed by:  Bill Matute

## 2018-03-18 LAB
ANION GAP SERPL CALC-SCNC: 7 MMOL/L (ref 5–15)
APPEARANCE FLD: ABNORMAL
BACTERIA SPEC CULT: NORMAL
BUN SERPL-MCNC: 14 MG/DL (ref 6–20)
BUN/CREAT SERPL: 5 (ref 12–20)
CALCIUM SERPL-MCNC: 8.3 MG/DL (ref 8.5–10.1)
CHLORIDE SERPL-SCNC: 101 MMOL/L (ref 97–108)
CO2 SERPL-SCNC: 30 MMOL/L (ref 21–32)
COLOR FLD: ABNORMAL
CREAT SERPL-MCNC: 2.77 MG/DL (ref 0.55–1.02)
ERYTHROCYTE [DISTWIDTH] IN BLOOD BY AUTOMATED COUNT: 17.9 % (ref 11.5–14.5)
ERYTHROCYTE [SEDIMENTATION RATE] IN BLOOD: 54 MM/HR (ref 0–20)
GLUCOSE BLD STRIP.AUTO-MCNC: 142 MG/DL (ref 65–100)
GLUCOSE BLD STRIP.AUTO-MCNC: 78 MG/DL (ref 65–100)
GLUCOSE BLD STRIP.AUTO-MCNC: 88 MG/DL (ref 65–100)
GLUCOSE BLD STRIP.AUTO-MCNC: 91 MG/DL (ref 65–100)
GLUCOSE BLD STRIP.AUTO-MCNC: 98 MG/DL (ref 65–100)
GLUCOSE SERPL-MCNC: 81 MG/DL (ref 65–100)
HCT VFR BLD AUTO: 30.1 % (ref 35–47)
HGB BLD-MCNC: 9.3 G/DL (ref 11.5–16)
LYMPHOCYTES NFR FLD: 5 %
MAGNESIUM SERPL-MCNC: 1.9 MG/DL (ref 1.6–2.4)
MCH RBC QN AUTO: 25.4 PG (ref 26–34)
MCHC RBC AUTO-ENTMCNC: 30.9 G/DL (ref 30–36.5)
MCV RBC AUTO: 82.2 FL (ref 80–99)
MONOS+MACROS NFR FLD: 4 %
NEUTROPHILS NFR FLD: 91 %
NRBC # BLD: 0 K/UL (ref 0–0.01)
NRBC BLD-RTO: 0 PER 100 WBC
NUC CELL # FLD: ABNORMAL /CU MM
PHOSPHATE SERPL-MCNC: 2 MG/DL (ref 2.6–4.7)
PLATELET # BLD AUTO: 143 K/UL (ref 150–400)
PMV BLD AUTO: 11.8 FL (ref 8.9–12.9)
POTASSIUM SERPL-SCNC: 3.6 MMOL/L (ref 3.5–5.1)
RBC # BLD AUTO: 3.66 M/UL (ref 3.8–5.2)
RBC # FLD: >100 /CU MM
SERVICE CMNT-IMP: ABNORMAL
SERVICE CMNT-IMP: NORMAL
SODIUM SERPL-SCNC: 138 MMOL/L (ref 136–145)
SPECIMEN SOURCE FLD: ABNORMAL
URATE SERPL-MCNC: 1.6 MG/DL (ref 2.6–6)
WBC # BLD AUTO: 6.4 K/UL (ref 3.6–11)

## 2018-03-18 PROCEDURE — 84100 ASSAY OF PHOSPHORUS: CPT | Performed by: INTERNAL MEDICINE

## 2018-03-18 PROCEDURE — 87186 SC STD MICRODIL/AGAR DIL: CPT | Performed by: INTERNAL MEDICINE

## 2018-03-18 PROCEDURE — 94640 AIRWAY INHALATION TREATMENT: CPT

## 2018-03-18 PROCEDURE — 36415 COLL VENOUS BLD VENIPUNCTURE: CPT | Performed by: INTERNAL MEDICINE

## 2018-03-18 PROCEDURE — 74011250637 HC RX REV CODE- 250/637: Performed by: INTERNAL MEDICINE

## 2018-03-18 PROCEDURE — 83735 ASSAY OF MAGNESIUM: CPT | Performed by: INTERNAL MEDICINE

## 2018-03-18 PROCEDURE — 65660000000 HC RM CCU STEPDOWN

## 2018-03-18 PROCEDURE — 74011250636 HC RX REV CODE- 250/636: Performed by: INTERNAL MEDICINE

## 2018-03-18 PROCEDURE — 85027 COMPLETE CBC AUTOMATED: CPT | Performed by: INTERNAL MEDICINE

## 2018-03-18 PROCEDURE — 74011636637 HC RX REV CODE- 636/637: Performed by: HOSPITALIST

## 2018-03-18 PROCEDURE — 74011000258 HC RX REV CODE- 258: Performed by: INTERNAL MEDICINE

## 2018-03-18 PROCEDURE — 82962 GLUCOSE BLOOD TEST: CPT

## 2018-03-18 PROCEDURE — 89050 BODY FLUID CELL COUNT: CPT | Performed by: INTERNAL MEDICINE

## 2018-03-18 PROCEDURE — 80048 BASIC METABOLIC PNL TOTAL CA: CPT | Performed by: INTERNAL MEDICINE

## 2018-03-18 PROCEDURE — 87077 CULTURE AEROBIC IDENTIFY: CPT | Performed by: INTERNAL MEDICINE

## 2018-03-18 PROCEDURE — 94664 DEMO&/EVAL PT USE INHALER: CPT

## 2018-03-18 PROCEDURE — 77010033678 HC OXYGEN DAILY

## 2018-03-18 PROCEDURE — 74011250636 HC RX REV CODE- 250/636: Performed by: HOSPITALIST

## 2018-03-18 PROCEDURE — 87205 SMEAR GRAM STAIN: CPT | Performed by: INTERNAL MEDICINE

## 2018-03-18 PROCEDURE — 74011250637 HC RX REV CODE- 250/637: Performed by: HOSPITALIST

## 2018-03-18 PROCEDURE — 74011000250 HC RX REV CODE- 250: Performed by: HOSPITALIST

## 2018-03-18 PROCEDURE — 84550 ASSAY OF BLOOD/URIC ACID: CPT | Performed by: INTERNAL MEDICINE

## 2018-03-18 PROCEDURE — 85652 RBC SED RATE AUTOMATED: CPT | Performed by: INTERNAL MEDICINE

## 2018-03-18 RX ORDER — HYDRALAZINE HYDROCHLORIDE 20 MG/ML
5 INJECTION INTRAMUSCULAR; INTRAVENOUS
Status: DISCONTINUED | OUTPATIENT
Start: 2018-03-18 | End: 2018-04-20 | Stop reason: HOSPADM

## 2018-03-18 RX ORDER — SODIUM CHLORIDE 9 MG/ML
50 INJECTION, SOLUTION INTRAVENOUS CONTINUOUS
Status: DISCONTINUED | OUTPATIENT
Start: 2018-03-18 | End: 2018-03-20

## 2018-03-18 RX ORDER — CARVEDILOL 6.25 MG/1
6.25 TABLET ORAL 2 TIMES DAILY WITH MEALS
Status: DISCONTINUED | OUTPATIENT
Start: 2018-03-18 | End: 2018-03-22

## 2018-03-18 RX ADMIN — APIXABAN 5 MG: 5 TABLET, FILM COATED ORAL at 17:44

## 2018-03-18 RX ADMIN — SODIUM CHLORIDE 50 ML/HR: 900 INJECTION, SOLUTION INTRAVENOUS at 15:28

## 2018-03-18 RX ADMIN — AMITRIPTYLINE HYDROCHLORIDE 25 MG: 10 TABLET, FILM COATED ORAL at 22:25

## 2018-03-18 RX ADMIN — OXYCODONE HYDROCHLORIDE 5 MG: 5 TABLET ORAL at 17:44

## 2018-03-18 RX ADMIN — PANTOPRAZOLE SODIUM 40 MG: 40 TABLET, DELAYED RELEASE ORAL at 09:17

## 2018-03-18 RX ADMIN — ARFORMOTEROL TARTRATE 15 MCG: 15 SOLUTION RESPIRATORY (INHALATION) at 10:05

## 2018-03-18 RX ADMIN — ARFORMOTEROL TARTRATE 15 MCG: 15 SOLUTION RESPIRATORY (INHALATION) at 20:15

## 2018-03-18 RX ADMIN — SODIUM CHLORIDE 1 G: 900 INJECTION, SOLUTION INTRAVENOUS at 19:54

## 2018-03-18 RX ADMIN — OXYCODONE HYDROCHLORIDE 5 MG: 5 TABLET ORAL at 22:25

## 2018-03-18 RX ADMIN — OXYCODONE HYDROCHLORIDE 5 MG: 5 TABLET ORAL at 09:18

## 2018-03-18 RX ADMIN — BUDESONIDE 500 MCG: 0.5 INHALANT RESPIRATORY (INHALATION) at 10:05

## 2018-03-18 RX ADMIN — Medication 10 ML: at 03:26

## 2018-03-18 RX ADMIN — HYDROXYCHLOROQUINE SULFATE 200 MG: 200 TABLET, FILM COATED ORAL at 17:44

## 2018-03-18 RX ADMIN — Medication 2500 MCG: at 09:17

## 2018-03-18 RX ADMIN — Medication 10 ML: at 14:00

## 2018-03-18 RX ADMIN — GEMFIBROZIL 300 MG: 600 TABLET ORAL at 09:18

## 2018-03-18 RX ADMIN — PREDNISONE 5 MG: 5 TABLET ORAL at 09:18

## 2018-03-18 RX ADMIN — CARVEDILOL 6.25 MG: 6.25 TABLET, FILM COATED ORAL at 09:18

## 2018-03-18 RX ADMIN — HYDROMORPHONE HYDROCHLORIDE 0.5 MG: 2 INJECTION INTRAMUSCULAR; INTRAVENOUS; SUBCUTANEOUS at 03:25

## 2018-03-18 RX ADMIN — HYDROMORPHONE HYDROCHLORIDE 0.5 MG: 2 INJECTION INTRAMUSCULAR; INTRAVENOUS; SUBCUTANEOUS at 12:35

## 2018-03-18 RX ADMIN — CARVEDILOL 6.25 MG: 6.25 TABLET, FILM COATED ORAL at 17:44

## 2018-03-18 RX ADMIN — HYDROXYCHLOROQUINE SULFATE 200 MG: 200 TABLET, FILM COATED ORAL at 09:18

## 2018-03-18 RX ADMIN — HYDROMORPHONE HYDROCHLORIDE 0.5 MG: 2 INJECTION INTRAMUSCULAR; INTRAVENOUS; SUBCUTANEOUS at 20:01

## 2018-03-18 RX ADMIN — ONDANSETRON 4 MG: 2 INJECTION INTRAMUSCULAR; INTRAVENOUS at 19:54

## 2018-03-18 RX ADMIN — BUDESONIDE 500 MCG: 0.5 INHALANT RESPIRATORY (INHALATION) at 20:15

## 2018-03-18 RX ADMIN — ONDANSETRON 4 MG: 2 INJECTION INTRAMUSCULAR; INTRAVENOUS at 09:17

## 2018-03-18 RX ADMIN — APIXABAN 5 MG: 5 TABLET, FILM COATED ORAL at 09:18

## 2018-03-18 RX ADMIN — Medication 10 ML: at 22:26

## 2018-03-18 NOTE — PROGRESS NOTES
Patient name: Alex Hemphill  MRN: [id number]    Nephrology Progress note:    Assessment:  ESRD: on HD TTS  E. Coli Bacteremia (Hard copy placed in chart): source intra-abd source/ E. Coli peritonitis. IV zosyn. Repeat Bld Cx 3/11-> Neg. Recent hx of Candida peritonitis  HTN:  Hx of PE: on Eliquis  Lupus: on plaquinil/azathioprine/prednisone  Anemia 2 to ESRD/Lupus: Hgb below goal  SHPT  Protein malnutrition  Hypokalemia    Plan/Recommendations:  Next HD tues  I am stopped the imuran for now-she is ESRD-not sure she still needs it and given her infection worried it may suppress immune system further  ID following  Repeat abd ct--shows improving loculated fluid collection  Repeat cell ct/gram st/c&s--ordered yesterday, not sure why nursing did not send this off-she had atelast 150cc out of drain  I am stopping allopurinol given her low platelets-shouldn't need this now since on HD    Subjective:  Peritoneal drain still in place.     ROS:   No nausea, no vomiting  No chest pain, no shortness of breath    Exam:  Visit Vitals    BP (!) 145/109 (BP 1 Location: Left arm, BP Patient Position: At rest)    Pulse (!) 110    Temp 99.2 °F (37.3 °C)    Resp 18    Ht 5' 5\" (1.651 m)    Wt 59.7 kg (131 lb 11.2 oz)    SpO2 98%    BMI 21.92 kg/m2     Wt Readings from Last 3 Encounters:   03/18/18 59.7 kg (131 lb 11.2 oz)   03/05/18 68 kg (150 lb)   01/29/18 69.4 kg (153 lb)       Intake/Output Summary (Last 24 hours) at 03/18/18 1421  Last data filed at 03/18/18 0855   Gross per 24 hour Intake                0 ml   Output              950 ml   Net             -950 ml       Gen: NAD/Frail appearance  HEENT:  AT/NC  Lungs/Chest wall: Clear. Cardiovascular: Regular rate, normal rhythm. Abdomen/: Soft, Peritoneal drain  Ext:  No peripheral edema. R AVF  CNS: alert and awake.  Answers appropriately      Current Facility-Administered Medications   Medication Dose Route Frequency Last Dose    carvedilol (COREG) tablet 6.25 mg  6.25 mg Oral BID WITH MEALS 6.25 mg at 03/18/18 0918    hydrALAZINE (APRESOLINE) 20 mg/mL injection 5 mg  5 mg IntraVENous Q6H PRN      cefepime (MAXIPIME) 1 g in 0.9% sodium chloride (MBP/ADV) 50 mL ADV  1 g IntraVENous Q24H 1 g at 03/17/18 2240    apixaban (ELIQUIS) tablet 5 mg  5 mg Oral BID 5 mg at 03/18/18 0918    0.9% sodium chloride infusion 250 mL  250 mL IntraVENous PRN      HYDROmorphone (DILAUDID) injection 0.5 mg  0.5 mg IntraVENous Q3H PRN 0.5 mg at 03/18/18 1235    glucose chewable tablet 16 g  4 Tab Oral PRN 16 g at 03/16/18 2307    dextrose (D50W) injection syrg 12.5-25 g  12.5-25 g IntraVENous PRN 12.5 g at 03/17/18 2306    glucagon (GLUCAGEN) injection 1 mg  1 mg IntraMUSCular PRN      epoetin pia (EPOGEN;PROCRIT) 14,000 Units  14,000 Units SubCUTAneous DIALYSIS TUE, THU & SAT 14,000 Units at 03/17/18 2250    insulin lispro (HUMALOG) injection   SubCUTAneous AC&HS Stopped at 03/12/18 2200    predniSONE (DELTASONE) tablet 5 mg  5 mg Oral DAILY 5 mg at 03/18/18 0918    pantoprazole (PROTONIX) tablet 40 mg  40 mg Oral ACB 40 mg at 03/18/18 0917    cyanocobalamin (VITAMIN B12) tablet 2,500 mcg  2,500 mcg Oral DAILY 2,500 mcg at 03/18/18 0917    hydroxychloroquine (PLAQUENIL) tablet 200 mg  200 mg Oral  mg at 03/18/18 0918    albuterol (PROVENTIL VENTOLIN) nebulizer solution 2.5 mg  2.5 mg Nebulization Q4H PRN      gemfibrozil (LOPID) tablet 300 mg  300 mg Oral DAILY 300 mg at 03/18/18 0918    oxyCODONE IR (ROXICODONE) tablet 2.5-5 mg 2.5-5 mg Oral Q6H PRN 5 mg at 03/18/18 0918    amitriptyline (ELAVIL) tablet 25 mg  25 mg Oral QHS 25 mg at 03/17/18 2240    sodium chloride (NS) flush 5-10 mL  5-10 mL IntraVENous Q8H 10 mL at 03/17/18 2241    sodium chloride (NS) flush 5-10 mL  5-10 mL IntraVENous PRN 10 mL at 03/18/18 0326    ondansetron (ZOFRAN) injection 4 mg  4 mg IntraVENous Q4H PRN 4 mg at 03/18/18 0917    arformoterol (BROVANA) neb solution 15 mcg  15 mcg Nebulization BID RT 15 mcg at 03/18/18 1005    And    budesonide (PULMICORT) 500 mcg/2 ml nebulizer suspension  500 mcg Nebulization BID  mcg at 03/18/18 1005       Labs/Data:    Lab Results   Component Value Date/Time    WBC 6.4 03/18/2018 03:06 AM    Hemoglobin (POC) 7.8 (L) 01/19/2018 12:30 PM    HGB 9.3 (L) 03/18/2018 03:06 AM    Hematocrit (POC) 23 (L) 01/19/2018 12:30 PM    HCT 30.1 (L) 03/18/2018 03:06 AM    PLATELET 609 (L) 39/02/1128 03:06 AM    MCV 82.2 03/18/2018 03:06 AM       Lab Results   Component Value Date/Time    Sodium 138 03/18/2018 03:06 AM    Potassium 3.6 03/18/2018 03:06 AM    Chloride 101 03/18/2018 03:06 AM    CO2 30 03/18/2018 03:06 AM    Anion gap 7 03/18/2018 03:06 AM    Glucose 81 03/18/2018 03:06 AM    BUN 14 03/18/2018 03:06 AM    Creatinine 2.77 (H) 03/18/2018 03:06 AM    BUN/Creatinine ratio 5 (L) 03/18/2018 03:06 AM    GFR est AA 24 (L) 03/18/2018 03:06 AM    GFR est non-AA 20 (L) 03/18/2018 03:06 AM    Calcium 8.3 (L) 03/18/2018 03:06 AM       Patient seen and examined. Chart reviewed. Labs, data and other pertinent notes reviewed in last 24 hrs.         Signed by:  Nataliya Matias

## 2018-03-18 NOTE — PROGRESS NOTES
Hospitalist Progress Note  Jayla He MD  Answering service: [phone] or [phone] from in house phone  Cell: [phone]      Date of Service:  3/18/2018  NAME:  Kentrell Villarreal  :  1985  MRN:  [id number]      Admission Summary: This is a 41-year-old female with multiple medical problems including SLE, end-stage renal disease (on hemodialysis, TTS), pulmonary embolism, on Eliquis (), hyperlipidemia, hypertension, recent Candida peritonitis, chronic bilateral pain. She comes over here because of abdominal pain since last 4 or 5 days.     The patient claims that last Wednesday, that is about 4 days ago, the patient started having fever and at that time, she noted that her temperature was 100.4 degrees Fahrenheit. At the same time, she also started having abdominal pain. She describes her pain as throughout the whole abdomen, sharp 7/10 to 9/10 in intensity off and on, nonradiating, slightly gets better with pain medication, but there was no triggering factor whatsoever. In any case, the next day the patient went for dialysis. She claims that over there, they did the blood cultures and put her on antibiotics. She was told that in the blood culture, there was a growth of bacteria, but she does not remember the name of the bacteria. She also does not remember the name of the antibiotic as well. Please also note that recently the patient was switched to hemodialysis and subsequently received a graft. Last Wednesday, her dialysis catheter was taken out. When the patient's abdominal pain continued then today she decided to come into the hospital.  The patient claims that she did not have any other symptoms including chest pain, shortness of breath, cough, nausea, vomiting, headache, dizziness.   No changes in bowel movements.       Interval history / Subjective:   F/u Abdominal pain   Abdominal pain and SOB improved after paracentesis  Feeling better  Assessment & Plan:     Acute peritonitis  -recent candida peritonitis  -CT abd 3/11 Large amount of free intraperitoneal fluid appears somewhat loculated  -Follow cultures, blood culture 3/11 unremarkable. Peritoneal fluid heavy E coli  -Antibiotics per ID  -Was on Vanc/Zosyn/Fluconazole. Now Vanc/FLuconazole discontinued. Continue Cefepime  -s/p paracentesis, follow studies. With drain in place, draining thick fluid. - Repeat CT on 3/17 shows improvement on loculated fluid. Repeat fluid culture and cell count ordered by nephro on 3/17. E coli bacteremia/Sepsis   that the patient was found to have E coli bacteremia at HD    Hypotension now with HTN  -Takes coreg 12.5 bid at home, now on 6.25,will increase as tolerated. -now resolved  -Was on stress steroids  -Now on regular oral steroids    Thrombocytopenia   -Monitor closely, improving  -Switched Zosyn to cefepime. Bilateral pedal edema  -Dopplers negative for DVT    SLE  -continue home meds  -D/C'd Imuran, allopurinol by nephro with acute infection and thrombocytopenia. History of PE  -on Eliquis since 2012  -Appreciate discussion with PMD 3/13. The patient was seen by hematology in 2013 but seems was lost to follow up. I think at that time the plan was to stop anticoagulation if her SLE is stable. Spoke to Dr Eliza Perez, he feels that the patient can come off Eliquis. His records do not suggest any recurrence of DVT/PE.  Spoke to Dr Magda Boland, he has not seen the patient since >1 yr. If antiphospholipid antibodies negative then the patient can come off Eliquis  -10/17 V/Q high probability for PE  -Per oncology the patient should be continued to Eliquis indefinitely    ESRD  -on HD TTS    Anemia  -of chronic disease  -Transfused 2 units PRBC with HD 3/15    Severe hepatic steatosis  -monitor    HTN  -stable    Severe protein calorie malnutrition  -Consult nutrition  -nutritional supp    Appreciate Nephro, ID, surgery input. Renal diet    Code status: FULL CODE  DVT prophylaxis: Eliquis  PTA: home    Plan: Follow ID, nephrology, surgery    Care Plan discussed with: Patient/Family  Disposition: Home w/Family     Hospital Problems  Date Reviewed: 3/11/2018          Codes Class Noted POA    * (Principal)Peritonitis ([address] ICD-10-CM: K65.9  ICD-9-CM: 567.9  3/11/2018 Yes                Review of Systems:   A comprehensive review of systems was negative except for that written in the HPI. Vital Signs:    Last 24hrs VS reviewed since prior progress note. Most recent are:  Visit Vitals    BP (!) 145/109    Pulse (!) 110    Temp 99.2 °F (37.3 °C)    Resp 18    Ht 5' 5\" (1.651 m)    Wt 59.7 kg (131 lb 11.2 oz)    SpO2 98%    BMI 21.92 kg/m2         Intake/Output Summary (Last 24 hours) at 03/18/18 1232  Last data filed at 03/18/18 0855   Gross per 24 hour   Intake                0 ml   Output              950 ml   Net             -950 ml        Physical Examination:        Constitutional:  No acute distress, cooperative but frail   ENT:  Oral mucous moist, oropharynx benign. Neck supple,    Resp:  CTA bilaterally. No wheezing/rhonchi/rales. CV:  Regular rhythm, normal rate, no murmurs, gallops, rubs    GI:  Soft, non distended, tender at drain site. normoactive bowel sounds, no hepatosplenomegaly     Musculoskeletal:   No edema , warm, R AVF    Neurologic:  Moves all extremities.   AAOx3,             Data Review:    Review and/or order of clinical lab test      Labs:     Recent Labs      03/18/18   0306  03/17/18   0729   WBC  6.4  6.2   HGB  9.3*  8.8*   HCT  30.1*  28.2*   PLT  143*  105*     Recent Labs      03/18/18   0306  03/17/18   0729  03/16/18   0335   NA  138  138  140   K  3.6  3.7  3.1*   CL  101  102  101   CO2  30  28  31   BUN  14  26*  18   CREA  2.77*  4.19*  3.01*   GLU  81  77  85   CA  8.3*  7.9*  7.9*   MG  1.9  1.9  1.8   PHOS  2.0*  2.9  2.1*   URICA  1.6*   --    --      Recent Labs 03/17/18   0729  03/16/18   0335   SGOT  30  24   ALT  8*  <6*   AP  63  64   TBILI  0.4  0.5   TP  5.8*  5.7*   ALB  1.1*  1.1*   GLOB  4.7*  4.6*     No results for input(s): INR, PTP, APTT in the last 72 hours. No lab exists for component: INREXT, INREXT   No results for input(s): FE, TIBC, PSAT, FERR in the last 72 hours. Lab Results   Component Value Date/Time    Folate 4.1 (L) 03/15/2018 10:32 AM      No results for input(s): PH, PCO2, PO2 in the last 72 hours. No results for input(s): CPK, CKNDX, TROIQ in the last 72 hours.     No lab exists for component: CPKMB  Lab Results   Component Value Date/Time    Cholesterol, total 117 09/25/2015 02:02 PM    HDL Cholesterol 31 (L) 09/25/2015 02:02 PM    LDL, calculated 57 09/25/2015 02:02 PM    Triglyceride 146 09/25/2015 02:02 PM    CHOL/HDL Ratio 7.7 (H) 05/31/2009 10:30 AM     Lab Results   Component Value Date/Time    Glucose (POC) 78 03/18/2018 12:29 PM    Glucose (POC) 98 03/18/2018 09:01 AM    Glucose (POC) 84 03/17/2018 11:14 PM    Glucose (POC) 50 (L) 03/17/2018 11:01 PM    Glucose (POC) 74 03/17/2018 10:48 PM     Lab Results   Component Value Date/Time    Color YELLOW/STRAW 08/12/2016 09:06 PM    Appearance CLEAR 08/12/2016 09:06 PM    Specific gravity 1.017 08/12/2016 09:06 PM    Specific gravity 1.010 07/11/2016 12:44 PM    pH (UA) 7.0 08/12/2016 09:06 PM    Protein 300 (A) 08/12/2016 09:06 PM    Glucose NEGATIVE  08/12/2016 09:06 PM    Ketone TRACE (A) 08/12/2016 09:06 PM    Bilirubin NEGATIVE  07/11/2016 12:44 PM    Urobilinogen 1.0 08/12/2016 09:06 PM    Nitrites NEGATIVE  08/12/2016 09:06 PM    Leukocyte Esterase NEGATIVE  08/12/2016 09:06 PM    Epithelial cells MODERATE (A) 08/12/2016 09:06 PM    Bacteria 1+ (A) 08/12/2016 09:06 PM    WBC 0-4 08/12/2016 09:06 PM    RBC 0-5 08/12/2016 09:06 PM         Medications Reviewed:     Current Facility-Administered Medications   Medication Dose Route Frequency    carvedilol (COREG) tablet 6.25 mg  6.25 mg Oral BID WITH MEALS    cefepime (MAXIPIME) 1 g in 0.9% sodium chloride (MBP/ADV) 50 mL ADV  1 g IntraVENous Q24H    apixaban (ELIQUIS) tablet 5 mg  5 mg Oral BID    0.9% sodium chloride infusion 250 mL  250 mL IntraVENous PRN    HYDROmorphone (DILAUDID) injection 0.5 mg  0.5 mg IntraVENous Q3H PRN    glucose chewable tablet 16 g  4 Tab Oral PRN    dextrose (D50W) injection syrg 12.5-25 g  12.5-25 g IntraVENous PRN    glucagon (GLUCAGEN) injection 1 mg  1 mg IntraMUSCular PRN    epoetin pia (EPOGEN;PROCRIT) 14,000 Units  14,000 Units SubCUTAneous DIALYSIS TUE, THU & SAT    insulin lispro (HUMALOG) injection   SubCUTAneous AC&HS    predniSONE (DELTASONE) tablet 5 mg  5 mg Oral DAILY    pantoprazole (PROTONIX) tablet 40 mg  40 mg Oral ACB    cyanocobalamin (VITAMIN B12) tablet 2,500 mcg  2,500 mcg Oral DAILY    hydroxychloroquine (PLAQUENIL) tablet 200 mg  200 mg Oral BID    albuterol (PROVENTIL VENTOLIN) nebulizer solution 2.5 mg  2.5 mg Nebulization Q4H PRN    gemfibrozil (LOPID) tablet 300 mg  300 mg Oral DAILY    oxyCODONE IR (ROXICODONE) tablet 2.5-5 mg  2.5-5 mg Oral Q6H PRN    amitriptyline (ELAVIL) tablet 25 mg  25 mg Oral QHS    sodium chloride (NS) flush 5-10 mL  5-10 mL IntraVENous Q8H    sodium chloride (NS) flush 5-10 mL  5-10 mL IntraVENous PRN    ondansetron (ZOFRAN) injection 4 mg  4 mg IntraVENous Q4H PRN    arformoterol (BROVANA) neb solution 15 mcg  15 mcg Nebulization BID RT    And    budesonide (PULMICORT) 500 mcg/2 ml nebulizer suspension  500 mcg Nebulization BID RT     ______________________________________________________________________  EXPECTED LENGTH OF STAY: 4d 21h  ACTUAL LENGTH OF STAY:          7                 Renay Mcarthur MD

## 2018-03-18 NOTE — PROGRESS NOTES
Bedside and Verbal shift change report given to DANITZA Curiel (oncoming nurse) by Peyton Tate (offgoing nurse). Report included the following information SBAR, Kardex, Intake/Output, MAR and Recent Results.

## 2018-03-19 LAB
ALBUMIN SERPL-MCNC: 1.1 G/DL (ref 3.5–5)
ALBUMIN/GLOB SERPL: 0.2 {RATIO} (ref 1.1–2.2)
ALP SERPL-CCNC: 52 U/L (ref 45–117)
ALT SERPL-CCNC: <6 U/L (ref 12–78)
ANION GAP SERPL CALC-SCNC: 8 MMOL/L (ref 5–15)
AST SERPL-CCNC: 17 U/L (ref 15–37)
BILIRUB SERPL-MCNC: 0.4 MG/DL (ref 0.2–1)
BUN SERPL-MCNC: 21 MG/DL (ref 6–20)
BUN/CREAT SERPL: 5 (ref 12–20)
CALCIUM SERPL-MCNC: 7.9 MG/DL (ref 8.5–10.1)
CHLORIDE SERPL-SCNC: 103 MMOL/L (ref 97–108)
CO2 SERPL-SCNC: 28 MMOL/L (ref 21–32)
CREAT SERPL-MCNC: 4.02 MG/DL (ref 0.55–1.02)
ERYTHROCYTE [DISTWIDTH] IN BLOOD BY AUTOMATED COUNT: 18 % (ref 11.5–14.5)
GLOBULIN SER CALC-MCNC: 4.5 G/DL (ref 2–4)
GLUCOSE BLD STRIP.AUTO-MCNC: 101 MG/DL (ref 65–100)
GLUCOSE BLD STRIP.AUTO-MCNC: 130 MG/DL (ref 65–100)
GLUCOSE BLD STRIP.AUTO-MCNC: 76 MG/DL (ref 65–100)
GLUCOSE BLD STRIP.AUTO-MCNC: 78 MG/DL (ref 65–100)
GLUCOSE BLD STRIP.AUTO-MCNC: 85 MG/DL (ref 65–100)
GLUCOSE BLD STRIP.AUTO-MCNC: 98 MG/DL (ref 65–100)
GLUCOSE SERPL-MCNC: 84 MG/DL (ref 65–100)
HCT VFR BLD AUTO: 30.5 % (ref 35–47)
HGB BLD-MCNC: 8.9 G/DL (ref 11.5–16)
MAGNESIUM SERPL-MCNC: 1.6 MG/DL (ref 1.6–2.4)
MCH RBC QN AUTO: 25.7 PG (ref 26–34)
MCHC RBC AUTO-ENTMCNC: 29.2 G/DL (ref 30–36.5)
MCV RBC AUTO: 88.2 FL (ref 80–99)
NRBC # BLD: 0 K/UL (ref 0–0.01)
NRBC BLD-RTO: 0 PER 100 WBC
PHOSPHATE SERPL-MCNC: 2.7 MG/DL (ref 2.6–4.7)
PLATELET # BLD AUTO: 142 K/UL (ref 150–400)
PMV BLD AUTO: 11.4 FL (ref 8.9–12.9)
POTASSIUM SERPL-SCNC: 3.9 MMOL/L (ref 3.5–5.1)
PROT SERPL-MCNC: 5.6 G/DL (ref 6.4–8.2)
RBC # BLD AUTO: 3.46 M/UL (ref 3.8–5.2)
SERVICE CMNT-IMP: ABNORMAL
SERVICE CMNT-IMP: ABNORMAL
SERVICE CMNT-IMP: NORMAL
SODIUM SERPL-SCNC: 139 MMOL/L (ref 136–145)
WBC # BLD AUTO: 7.3 K/UL (ref 3.6–11)

## 2018-03-19 PROCEDURE — 36415 COLL VENOUS BLD VENIPUNCTURE: CPT | Performed by: INTERNAL MEDICINE

## 2018-03-19 PROCEDURE — 84100 ASSAY OF PHOSPHORUS: CPT | Performed by: INTERNAL MEDICINE

## 2018-03-19 PROCEDURE — 80053 COMPREHEN METABOLIC PANEL: CPT | Performed by: INTERNAL MEDICINE

## 2018-03-19 PROCEDURE — 74011000258 HC RX REV CODE- 258: Performed by: INTERNAL MEDICINE

## 2018-03-19 PROCEDURE — 97116 GAIT TRAINING THERAPY: CPT

## 2018-03-19 PROCEDURE — 74011250636 HC RX REV CODE- 250/636: Performed by: INTERNAL MEDICINE

## 2018-03-19 PROCEDURE — 74011250636 HC RX REV CODE- 250/636: Performed by: HOSPITALIST

## 2018-03-19 PROCEDURE — 83735 ASSAY OF MAGNESIUM: CPT | Performed by: INTERNAL MEDICINE

## 2018-03-19 PROCEDURE — 94640 AIRWAY INHALATION TREATMENT: CPT

## 2018-03-19 PROCEDURE — 82962 GLUCOSE BLOOD TEST: CPT

## 2018-03-19 PROCEDURE — 85027 COMPLETE CBC AUTOMATED: CPT | Performed by: INTERNAL MEDICINE

## 2018-03-19 PROCEDURE — 74011250637 HC RX REV CODE- 250/637: Performed by: HOSPITALIST

## 2018-03-19 PROCEDURE — 74011000250 HC RX REV CODE- 250: Performed by: HOSPITALIST

## 2018-03-19 PROCEDURE — 74011636637 HC RX REV CODE- 636/637: Performed by: HOSPITALIST

## 2018-03-19 PROCEDURE — 65660000000 HC RM CCU STEPDOWN

## 2018-03-19 PROCEDURE — 97535 SELF CARE MNGMENT TRAINING: CPT

## 2018-03-19 PROCEDURE — 74011250637 HC RX REV CODE- 250/637: Performed by: INTERNAL MEDICINE

## 2018-03-19 RX ADMIN — GEMFIBROZIL 300 MG: 600 TABLET ORAL at 10:07

## 2018-03-19 RX ADMIN — HYDROXYCHLOROQUINE SULFATE 200 MG: 200 TABLET, FILM COATED ORAL at 20:17

## 2018-03-19 RX ADMIN — ARFORMOTEROL TARTRATE 15 MCG: 15 SOLUTION RESPIRATORY (INHALATION) at 10:41

## 2018-03-19 RX ADMIN — Medication 10 ML: at 23:27

## 2018-03-19 RX ADMIN — CARVEDILOL 6.25 MG: 6.25 TABLET, FILM COATED ORAL at 07:00

## 2018-03-19 RX ADMIN — HYDROMORPHONE HYDROCHLORIDE 0.5 MG: 2 INJECTION INTRAMUSCULAR; INTRAVENOUS; SUBCUTANEOUS at 06:59

## 2018-03-19 RX ADMIN — Medication 10 ML: at 06:59

## 2018-03-19 RX ADMIN — Medication 5 ML: at 14:00

## 2018-03-19 RX ADMIN — Medication 10 ML: at 23:01

## 2018-03-19 RX ADMIN — HYDROMORPHONE HYDROCHLORIDE 0.5 MG: 2 INJECTION INTRAMUSCULAR; INTRAVENOUS; SUBCUTANEOUS at 20:18

## 2018-03-19 RX ADMIN — SODIUM CHLORIDE 1 G: 900 INJECTION, SOLUTION INTRAVENOUS at 20:24

## 2018-03-19 RX ADMIN — CARVEDILOL 6.25 MG: 6.25 TABLET, FILM COATED ORAL at 16:51

## 2018-03-19 RX ADMIN — HYDROXYCHLOROQUINE SULFATE 200 MG: 200 TABLET, FILM COATED ORAL at 10:07

## 2018-03-19 RX ADMIN — PREDNISONE 5 MG: 5 TABLET ORAL at 10:07

## 2018-03-19 RX ADMIN — BUDESONIDE 500 MCG: 0.5 INHALANT RESPIRATORY (INHALATION) at 10:41

## 2018-03-19 RX ADMIN — HYDROMORPHONE HYDROCHLORIDE 0.5 MG: 2 INJECTION INTRAMUSCULAR; INTRAVENOUS; SUBCUTANEOUS at 16:52

## 2018-03-19 RX ADMIN — PANTOPRAZOLE SODIUM 40 MG: 40 TABLET, DELAYED RELEASE ORAL at 07:00

## 2018-03-19 RX ADMIN — APIXABAN 5 MG: 5 TABLET, FILM COATED ORAL at 10:07

## 2018-03-19 RX ADMIN — ARFORMOTEROL TARTRATE 15 MCG: 15 SOLUTION RESPIRATORY (INHALATION) at 22:00

## 2018-03-19 RX ADMIN — Medication 10 ML: at 20:22

## 2018-03-19 RX ADMIN — Medication 2500 MCG: at 10:07

## 2018-03-19 RX ADMIN — HYDROMORPHONE HYDROCHLORIDE 0.5 MG: 2 INJECTION INTRAMUSCULAR; INTRAVENOUS; SUBCUTANEOUS at 23:20

## 2018-03-19 RX ADMIN — HYDROMORPHONE HYDROCHLORIDE 0.5 MG: 2 INJECTION INTRAMUSCULAR; INTRAVENOUS; SUBCUTANEOUS at 10:06

## 2018-03-19 RX ADMIN — BUDESONIDE 500 MCG: 0.5 INHALANT RESPIRATORY (INHALATION) at 22:00

## 2018-03-19 RX ADMIN — APIXABAN 5 MG: 5 TABLET, FILM COATED ORAL at 20:18

## 2018-03-19 RX ADMIN — AMITRIPTYLINE HYDROCHLORIDE 25 MG: 10 TABLET, FILM COATED ORAL at 22:59

## 2018-03-19 RX ADMIN — SODIUM CHLORIDE 50 ML/HR: 900 INJECTION, SOLUTION INTRAVENOUS at 11:35

## 2018-03-19 RX ADMIN — HYDROMORPHONE HYDROCHLORIDE 0.5 MG: 2 INJECTION INTRAMUSCULAR; INTRAVENOUS; SUBCUTANEOUS at 13:14

## 2018-03-19 NOTE — PROGRESS NOTES
Bedside and Verbal shift change report given to Christine Anne RN (oncoming nurse) by Shwetha Spring (offgoing nurse). Report included the following information SBAR, Kardex and MAR.

## 2018-03-19 NOTE — PROGRESS NOTES
Problem: Falls - Risk of  Goal: *Absence of Falls  Document Alex Fall Risk and appropriate interventions in the flowsheet.    Outcome: Progressing Towards Goal  Fall Risk Interventions:  Mobility Interventions: Communicate number of staff needed for ambulation/transfer         Medication Interventions: Bed/chair exit alarm    Elimination Interventions: Call light in reach

## 2018-03-19 NOTE — PROGRESS NOTES
Bedside and Verbal shift change report given to Erinn Evans RN (oncoming nurse) by Renaldo Gonsalez RN (offgoing nurse). Report included the following information SBAR, Kardex, ED Summary, Intake/Output, MAR and Recent Results.

## 2018-03-19 NOTE — PROGRESS NOTES
Problem: Mobility Impaired (Adult and Pediatric)  Goal: *Acute Goals and Plan of Care (Insert Text)  Physical Therapy Goals  Initiated 3/16/2018  1. Patient will move from supine to sit and sit to supine  in bed with supervision/set-up within 7 day(s). 2.  Patient will transfer from bed to chair and chair to bed with supervision/set-up using the least restrictive device within 7 day(s). 3.  Patient will perform sit to stand with minimal assistance/contact guard assist within 7 day(s). 4.  Patient will ambulate with minimal assistance/contact guard assist for 50 feet with the least restrictive device within 7 day(s). physical Therapy TREATMENT  Patient: Mya Galarzas (89 y.o. female)  Date: 3/19/2018  Diagnosis: Peritonitis ([address] Peritonitis ([address]       Precautions: Fall (abd drain)  Chart, physical therapy assessment, plan of care and goals were reviewed. ASSESSMENT:  Pt is making steady progress with improved mobility, transfers OOB with supervision, sit to stand with minimal assist, tolerated ambulation with a rolling walker x 100 feet with CGA with slow pace, generally steady with rolling walker, mobility has improved today and may be  appropriate to return home with family support and nurse aide assist with home health PT, pt will need to be able to manage 10 steps to return home safely  Progression toward goals:  []    Improving appropriately and progressing toward goals  [x]    Improving slowly and progressing toward goals  []    Not making progress toward goals and plan of care will be adjusted     PLAN:  Patient continues to benefit from skilled intervention to address the above impairments. Continue treatment per established plan of care. Discharge Recommendations:  Home Health  Further Equipment Recommendations for Discharge:  rolling walker, reports she can borrow her grandmother's walker     SUBJECTIVE:   Patient agreeable to participate.     OBJECTIVE DATA SUMMARY:   Critical Behavior:  Neurologic State: Alert  Orientation Level: Oriented X4  Cognition: Follows commands  Safety/Judgement: Fall prevention  Functional Mobility Training:  Bed Mobility:     Supine to Sit: Supervision     Scooting: Supervision        Transfers:  Sit to Stand: Minimum assistance; Additional time;Assist x1  Stand to Sit: Contact guard assistance; Additional time;Assist x1                             Balance:  Sitting: Intact  Standing: Impaired  Standing - Static: Good  Standing - Dynamic : Good (with rolling walker)  Ambulation/Gait Training:  Distance (ft): 100 Feet (ft)  Assistive Device: Gait belt;Walker, rolling  Ambulation - Level of Assistance: Contact guard assistance;Assist x1        Gait Abnormalities: Decreased step clearance              Speed/Vanita: Slow  Step Length: Left shortened;Right shortened             Pain:  Pain Scale 1: Numeric (0 - 10)  Pain Intensity 1: 7  Pain Location 1: Abdomen  Pain Orientation 1: Right  Pain Description 1: Aching  Pain Intervention(s) 1: see MAR  Activity Tolerance:   Gradually improving     After treatment:   [x]    Patient left in no apparent distress sitting up in chair  []    Patient left in no apparent distress in bed  [x]    Call bell left within reach  [x]    Nursing notified  []    Caregiver present  []    Bed alarm activated    COMMUNICATION/COLLABORATION:   The patients plan of care was discussed with: Registered Nurse    Bertha Smith   Time Calculation: 16 mins

## 2018-03-19 NOTE — PROGRESS NOTES
Hospitalist Progress Note  Elisabet Franco MD  Answering service: [phone] from in house phone        Date of Service:  3/19/2018  NAME:  Jessica Guerra  :  1985  MRN:  [id number]      Admission Summary: This is a 60-year-old female with multiple medical problems including SLE, end-stage renal disease (on hemodialysis, TTS), pulmonary embolism, on Eliquis (), hyperlipidemia, hypertension, recent Candida peritonitis, chronic bilateral pain. She comes over here because of abdominal pain since last 4 or 5 days.     T       Interval history / Subjective:   F/u Abdominal pain   Abdominal pain and SOB improved after paracentesis, drain still putting out   Feeling better overall       Assessment & Plan:     Acute peritonitis  -recent candida peritonitis  -CT abd 3/11 Large amount of free intraperitoneal fluid appears somewhat loculated  -Follow cultures, blood culture 3/11 unremarkable. Peritoneal fluid heavy E coli  -Antibiotics per ID  -Was on Vanc/Zosyn/Fluconazole. Now Vanc/FLuconazole discontinued. Continue Cefepime  -s/p paracentesis, follow studies. With drain in place, draining serosanguinous now  - Repeat CT on 3/17 shows improvement on loculated fluid. Repeat fluid culture and cell count ordered by nephro on 3/17. C/s from 3/18 positive for ecoli , continue cefepime   Off imuran now    E coli bacteremia/Sepsis   that the patient was found to have E coli bacteremia at HD     Hypotension now with HTN  -Takes coreg 12.5 bid at home, now on 6.25,will increase as tolerated. -now resolved  -Was on stress steroids  -Now on regular oral steroids    Thrombocytopenia   -Monitor closely, improving  -Switched Zosyn to cefepime. Stable at 142    Bilateral pedal edema  -Dopplers negative for DVT    SLE  -continue home meds  -D/C'd Imuran, allopurinol by nephro with acute infection and thrombocytopenia.      History of PE  -on Eliquis since 2012  See note from previous hosptalist  -Appreciate discussion with PMD 3/13. The patient was seen by hematology in 2013 but seems was lost to follow up. I think at that time the plan was to stop anticoagulation if her SLE is stable. Spoke to Dr Eliza Perez, he feels that the patient can come off Eliquis. His records do not suggest any recurrence of DVT/PE. Spoke to Dr Magda Boland, he has not seen the patient since >1 yr. If antiphospholipid antibodies negative then the patient can come off Eliquis  -10/17 V/Q high probability for PE  -Per oncology the patient should be continued to Eliquis indefinitely     ESRD  -on HD TTS    Anemia  -of chronic disease  -Transfused 2 units PRBC with HD 3/15    Severe hepatic steatosis  -monitor    HTN  -stable    Severe protein calorie malnutrition  -Consult nutrition  -nutritional supp    Appreciate Nephro, ID, surgery input. Renal diet    Code status: FULL CODE  DVT prophylaxis: Eliquis  PTA: home    Plan: Follow ID, nephrology, surgery    Care Plan discussed with: Patient/Family  Disposition: Home w/Family     Hospital Problems  Date Reviewed: 3/11/2018          Codes Class Noted POA    * (Principal)Peritonitis ([facility] [address] ICD-10-CM: K65.9  ICD-9-CM: 567.9  3/11/2018 Yes                Review of Systems:   A comprehensive review of systems was negative except for that written in the HPI. Vital Signs:    Last 24hrs VS reviewed since prior progress note.  Most recent are:  Visit Vitals    BP (!) 134/99 (BP 1 Location: Left arm, BP Patient Position: At rest)    Pulse (!) 109    Temp 98.9 °F (37.2 °C)    Resp 18    Ht 5' 5\" (1.651 m)    Wt 65.4 kg (144 lb 1.6 oz)    SpO2 97%    BMI 23.98 kg/m2         Intake/Output Summary (Last 24 hours) at 03/19/18 1241  Last data filed at 03/19/18 1015   Gross per 24 hour   Intake                0 ml   Output              150 ml   Net             -150 ml        Physical Examination:        Constitutional:  No acute distress, cooperative but frail   ENT:  Oral mucous moist, oropharynx benign. Neck supple,    Resp:  CTA bilaterally. No wheezing/rhonchi/rales. CV:  Regular rhythm, normal rate, no murmurs, gallops, rubs    GI:  Soft, non distended, tender at drain site. normoactive bowel sounds, no hepatosplenomegaly     Musculoskeletal:   No edema , warm, R AVF    Neurologic:  Moves all extremities. AAOx3,             Data Review:    Review and/or order of clinical lab test      Labs:     Recent Labs      03/19/18   0535  03/18/18   0306   WBC  7.3  6.4   HGB  8.9*  9.3*   HCT  30.5*  30.1*   PLT  142*  143*     Recent Labs      03/19/18   0535  03/18/18   0306  03/17/18   0729   NA  139  138  138   K  3.9  3.6  3.7   CL  103  101  102   CO2  28  30  28   BUN  21*  14  26*   CREA  4.02*  2.77*  4.19*   GLU  84  81  77   CA  7.9*  8.3*  7.9*   MG  1.6  1.9  1.9   PHOS  2.7  2.0*  2.9   URICA   --   1.6*   --      Recent Labs      03/19/18   0535  03/17/18   0729   SGOT  17  30   ALT  <6*  8*   AP  52  63   TBILI  0.4  0.4   TP  5.6*  5.8*   ALB  1.1*  1.1*   GLOB  4.5*  4.7*     No results for input(s): INR, PTP, APTT in the last 72 hours. No lab exists for component: INREXT, INREXT   No results for input(s): FE, TIBC, PSAT, FERR in the last 72 hours. Lab Results   Component Value Date/Time    Folate 4.1 (L) 03/15/2018 10:32 AM      No results for input(s): PH, PCO2, PO2 in the last 72 hours. No results for input(s): CPK, CKNDX, TROIQ in the last 72 hours.     No lab exists for component: CPKMB  Lab Results   Component Value Date/Time    Cholesterol, total 117 09/25/2015 02:02 PM    HDL Cholesterol 31 (L) 09/25/2015 02:02 PM    LDL, calculated 57 09/25/2015 02:02 PM    Triglyceride 146 09/25/2015 02:02 PM    CHOL/HDL Ratio 7.7 (H) 05/31/2009 10:30 AM     Lab Results   Component Value Date/Time    Glucose (POC) 98 03/19/2018 12:10 PM    Glucose (POC) 78 03/19/2018 11:23 AM    Glucose (POC) 101 (H) 03/19/2018 07:10 AM    Glucose (POC) 76 03/19/2018 06:55 AM    Glucose (POC) 91 03/18/2018 09:36 PM     Lab Results   Component Value Date/Time    Color YELLOW/STRAW 08/12/2016 09:06 PM    Appearance CLEAR 08/12/2016 09:06 PM    Specific gravity 1.017 08/12/2016 09:06 PM    Specific gravity 1.010 07/11/2016 12:44 PM    pH (UA) 7.0 08/12/2016 09:06 PM    Protein 300 (A) 08/12/2016 09:06 PM    Glucose NEGATIVE  08/12/2016 09:06 PM    Ketone TRACE (A) 08/12/2016 09:06 PM    Bilirubin NEGATIVE  07/11/2016 12:44 PM    Urobilinogen 1.0 08/12/2016 09:06 PM    Nitrites NEGATIVE  08/12/2016 09:06 PM    Leukocyte Esterase NEGATIVE  08/12/2016 09:06 PM    Epithelial cells MODERATE (A) 08/12/2016 09:06 PM    Bacteria 1+ (A) 08/12/2016 09:06 PM    WBC 0-4 08/12/2016 09:06 PM    RBC 0-5 08/12/2016 09:06 PM         Medications Reviewed:     Current Facility-Administered Medications   Medication Dose Route Frequency    carvedilol (COREG) tablet 6.25 mg  6.25 mg Oral BID WITH MEALS    hydrALAZINE (APRESOLINE) 20 mg/mL injection 5 mg  5 mg IntraVENous Q6H PRN    0.9% sodium chloride infusion  50 mL/hr IntraVENous CONTINUOUS    cefepime (MAXIPIME) 1 g in 0.9% sodium chloride (MBP/ADV) 50 mL ADV  1 g IntraVENous Q24H    apixaban (ELIQUIS) tablet 5 mg  5 mg Oral BID    0.9% sodium chloride infusion 250 mL  250 mL IntraVENous PRN    HYDROmorphone (DILAUDID) injection 0.5 mg  0.5 mg IntraVENous Q3H PRN    glucose chewable tablet 16 g  4 Tab Oral PRN    dextrose (D50W) injection syrg 12.5-25 g  12.5-25 g IntraVENous PRN    glucagon (GLUCAGEN) injection 1 mg  1 mg IntraMUSCular PRN    epoetin pia (EPOGEN;PROCRIT) 14,000 Units  14,000 Units SubCUTAneous DIALYSIS TUE, THU & SAT    insulin lispro (HUMALOG) injection   SubCUTAneous AC&HS    predniSONE (DELTASONE) tablet 5 mg  5 mg Oral DAILY    pantoprazole (PROTONIX) tablet 40 mg  40 mg Oral ACB    cyanocobalamin (VITAMIN B12) tablet 2,500 mcg  2,500 mcg Oral DAILY    hydroxychloroquine (PLAQUENIL) tablet 200 mg  200 mg Oral BID    albuterol (PROVENTIL VENTOLIN) nebulizer solution 2.5 mg  2.5 mg Nebulization Q4H PRN    gemfibrozil (LOPID) tablet 300 mg  300 mg Oral DAILY    oxyCODONE IR (ROXICODONE) tablet 2.5-5 mg  2.5-5 mg Oral Q6H PRN    amitriptyline (ELAVIL) tablet 25 mg  25 mg Oral QHS    sodium chloride (NS) flush 5-10 mL  5-10 mL IntraVENous Q8H    sodium chloride (NS) flush 5-10 mL  5-10 mL IntraVENous PRN    ondansetron (ZOFRAN) injection 4 mg  4 mg IntraVENous Q4H PRN    arformoterol (BROVANA) neb solution 15 mcg  15 mcg Nebulization BID RT    And    budesonide (PULMICORT) 500 mcg/2 ml nebulizer suspension  500 mcg Nebulization BID RT     ______________________________________________________________________  EXPECTED LENGTH OF STAY: 4d 21h  ACTUAL LENGTH OF STAY:          8                 Matthew Raza MD

## 2018-03-19 NOTE — PROGRESS NOTES
Patient name: Rhett Kim  MRN: [id number]    Nephrology Progress note:    Assessment:  ESRD: on HD TTS  E. Coli Bacteremia (Hard copy placed in chart): source intra-abd source/ E. Coli peritonitis. IV zosyn. Repeat Bld Cx 3/11-> Neg. 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  Lower dose       Subjective:  resting         Exam:  Visit Vitals    BP (!) 133/96    Pulse (!) 107    Temp 98.8 °F (37.1 °C)    Resp 18    Ht 5' 5\" (1.651 m)    Wt 65.4 kg (144 lb 1.6 oz)    SpO2 96%    BMI 23.98 kg/m2     Wt Readings from Last 3 Encounters:   03/19/18 65.4 kg (144 lb 1.6 oz)   03/05/18 68 kg (150 lb)   01/29/18 69.4 kg (153 lb)       Intake/Output Summary (Last 24 hours) at 03/19/18 0949  Last data filed at 03/18/18 1529   Gross per 24 hour   Intake                0 ml   Output               50 ml   Net              -50 ml     resting    Current Facility-Administered Medications   Medication Dose Route Frequency Last Dose    carvedilol (COREG) tablet 6.25 mg  6.25 mg Oral BID WITH MEALS 6.25 mg at 03/19/18 0700    hydrALAZINE (APRESOLINE) 20 mg/mL injection 5 mg  5 mg IntraVENous Q6H PRN      0.9% sodium chloride infusion  50 mL/hr IntraVENous CONTINUOUS 50 mL/hr at 03/18/18 1528    cefepime (MAXIPIME) 1 g in 0.9% sodium chloride (MBP/ADV) 50 mL ADV  1 g IntraVENous Q24H 1 g at 03/18/18 1954    apixaban (ELIQUIS) tablet 5 mg  5 mg Oral BID 5 mg at 03/18/18 1744    0.9% sodium chloride infusion 250 mL  250 mL IntraVENous PRN      HYDROmorphone (DILAUDID) injection 0.5 mg  0.5 mg IntraVENous Q3H PRN 0.5 mg at 03/19/18 0659    glucose chewable tablet 16 g  4 Tab Oral PRN 16 g at 03/16/18 2307    dextrose (D50W) injection syrg 12.5-25 g  12.5-25 g IntraVENous PRN 12.5 g at 03/17/18 2306    glucagon (GLUCAGEN) injection 1 mg  1 mg IntraMUSCular PRN      epoetin pia (EPOGEN;PROCRIT) 14,000 Units  14,000 Units SubCUTAneous DIALYSIS TUE, THU & SAT 14,000 Units at 03/17/18 2250    insulin lispro (HUMALOG) injection   SubCUTAneous AC&HS Stopped at 03/12/18 2200    predniSONE (DELTASONE) tablet 5 mg  5 mg Oral DAILY 5 mg at 03/18/18 0918    pantoprazole (PROTONIX) tablet 40 mg  40 mg Oral ACB 40 mg at 03/19/18 0700    cyanocobalamin (VITAMIN B12) tablet 2,500 mcg  2,500 mcg Oral DAILY 2,500 mcg at 03/18/18 3714    hydroxychloroquine (PLAQUENIL) tablet 200 mg  200 mg Oral  mg at 03/18/18 1744    albuterol (PROVENTIL VENTOLIN) nebulizer solution 2.5 mg  2.5 mg Nebulization Q4H PRN      gemfibrozil (LOPID) tablet 300 mg  300 mg Oral DAILY 300 mg at 03/18/18 4226    oxyCODONE IR (ROXICODONE) tablet 2.5-5 mg  2.5-5 mg Oral Q6H PRN 5 mg at 03/18/18 2225    amitriptyline (ELAVIL) tablet 25 mg  25 mg Oral QHS 25 mg at 03/18/18 2225    sodium chloride (NS) flush 5-10 mL  5-10 mL IntraVENous Q8H 10 mL at 03/19/18 0659    sodium chloride (NS) flush 5-10 mL  5-10 mL IntraVENous PRN 10 mL at 03/18/18 0326    ondansetron (ZOFRAN) injection 4 mg  4 mg IntraVENous Q4H PRN 4 mg at 03/18/18 1954    arformoterol (BROVANA) neb solution 15 mcg  15 mcg Nebulization BID RT 15 mcg at 03/18/18 2015    And    budesonide (PULMICORT) 500 mcg/2 ml nebulizer suspension  500 mcg Nebulization BID  mcg at 03/18/18 2015       Labs/Data:    Lab Results Component Value Date/Time    WBC 7.3 03/19/2018 05:35 AM    Hemoglobin (POC) 7.8 (L) 01/19/2018 12:30 PM    HGB 8.9 (L) 03/19/2018 05:35 AM    Hematocrit (POC) 23 (L) 01/19/2018 12:30 PM    HCT 30.5 (L) 03/19/2018 05:35 AM    PLATELET 125 (L) 75/26/1217 05:35 AM    MCV 88.2 03/19/2018 05:35 AM       Lab Results   Component Value Date/Time    Sodium 139 03/19/2018 05:35 AM    Potassium 3.9 03/19/2018 05:35 AM    Chloride 103 03/19/2018 05:35 AM    CO2 28 03/19/2018 05:35 AM    Anion gap 8 03/19/2018 05:35 AM    Glucose 84 03/19/2018 05:35 AM    BUN 21 (H) 03/19/2018 05:35 AM    Creatinine 4.02 (H) 03/19/2018 05:35 AM    BUN/Creatinine ratio 5 (L) 03/19/2018 05:35 AM    GFR est AA 16 (L) 03/19/2018 05:35 AM    GFR est non-AA 13 (L) 03/19/2018 05:35 AM    Calcium 7.9 (L) 03/19/2018 05:35 AM

## 2018-03-19 NOTE — PROGRESS NOTES
Bedside and Verbal shift change report given to [address] [place] [address] (oncoming nurse) by Shira Contreras (offgoing nurse). Report included the following information SBAR, Kardex and ED Summary.

## 2018-03-19 NOTE — PROGRESS NOTES
Wilberto Mera  is a 29 y. o.female  with a history of SLE, lupus nephritis leading to ESRD //HTN/ treated for candida peritonitis in Dec 2017  Is admitted with abdominal pain and fever since last Thursday  Pt was in [facility] in Dec 2017 for candida peritonitis and PD was stopped and she was started on HD thru a catheter. She took 4 weeks of fluconazole and was doing fine She had a graft placed on the rt arm on 1/19/19 - She developed fever and abdominal pain on 3/6 and blood culture sent from the catheter at dialysis center- The catheter was removed on 3/7 and the graft was accessed last week-   She was given IV gent  at the dialysis center.  As the abdominal pain was getting worse she came to the ED on 3/11 and was admitted  CT abdomen showed Large amount of free intraperitoneal fluid that is  loculated.         Multiple hospitalizations in the past few months  OCt 14-17 /2017 for left lower lobe pneumonia/effusion- was found to have PE on the rt side  10/27-10/30 possible pneumonia- same infiltrate left lower lobe- sent on augmentin     11/26-/11/28 for b/l leg swelling and abdominal swelling     12/12/17-12/22/17- fungal peritonitis- treated with fluconazole for 4 weeks     She had  peritoneal dialysis since 2015- had cath placed in 2015 until it was removed in Dec 2017      subjective  Feeling better  Still having  drainage from the tube  Repeat CT scan from 3/17 is better  Objective:   VITALS:    Patient Vitals for the past 12 hrs:   Temp Pulse Resp BP SpO2   03/19/18 1037 - - - - 97 %   03/19/18 0949 98.9 °F (37.2 °C) (!) 109 18 (!) 134/99 95 %   03/19/18 0525 98.8 °F (37.1 °C) (!) 107 18 (!) 133/96 96 %   03/19/18 0040 99.1 °F (37.3 °C) (!) 109 18 (!) 131/99 96 %   PHYSICAL EXAM:   General:                   looks betetr Chronically ill ,weak  Lungs:                       B/l air entry     Heart:                                  s1s2  Abdomen:                  Drain -purulent reddish fluid  Less tender  Extremities:               Rt arm graft   Skin:                                    Dry skin- striae over arms/abdomen  Lymph:                      Cervical, supraclavicular normal.  Neurologic:                Grossly non-focal     Pertinent Labs  Wbc 2.5>6.4   Hb 8.4  PLT 91  Lactate 1.5  Albumin 1.5  Peritoneal fluid- 3524 WBC ( 85% N)  E.coli     IMAGING RESULTS:       Impression/Recommendation  33 yr female with SLE/ lupus nephritis/HTN -h/o peritoneal dialysis until Dec 2017 /Candida peritonitis in Dec 2017/ treated with 4 weeks of fluconazole     Admitted with abdominal pain and fever of  5 days     E.coli  peritonitis with e.coli bacteremia  Loculated peritoneal fluid- has a drain- repeat imaging from 3/17 still shows a collection but significantly reduced- Will have to make sure that it is completely drained - will have to discuss with IR to see whether the pig tail has to be repositioned   The susceptibility  profile  of the e.coli in blood is different from peritoneal fluid-The blood e.coli is resistant to levaquin  On cefepime      Recently treated candida peritonitis in Dec 2017- that time she was on PD     SLE-on imuran/plaquenil and prednisone  [place] has been discontinued     Discussed with patient

## 2018-03-19 NOTE — PROGRESS NOTES
Problem: Self Care Deficits Care Plan (Adult)  Goal: *Acute Goals and Plan of Care (Insert Text)  Occupational Therapy Goals  Initiated 3/16/2018  1. Patient will perform grooming with independence standing at sink for 4 tasks with NO LOB within 7 day(s). 2.  Patient will perform bathing with supervision/set-up within 7 day(s). 3.  Patient will perform upper body dressing and LB dressing with supervision/set-up with AE and/or AE PRN within 7 day(s). 4.  Patient will perform toilet transfers with supervision/set-up within 7 day(s). 5.  Patient will perform all aspects of toileting with supervision/set-up within 7 day(s). 6.  Patient will participate in upper extremity therapeutic exercise/activities with supervision/set-up for 5 minutes within 7 day(s). 7.  Patient will utilize energy conservation techniques during functional activities with verbal cues within 7 day(s). Occupational Therapy TREATMENT  Patient: Hannah Parker (13 y.o. female)  Date: 3/19/2018  Diagnosis: Peritonitis ([facility] [address] Peritonitis ([facility] [address]       Precautions: Fall (abd drain)  Chart, occupational therapy assessment, plan of care, and goals were reviewed. ASSESSMENT:  Based on the objective data below, the patient participated in bed mobility, active and simulated self care, sit to/from stand, and scooting along edge of bed with set up for upper body self care, mod to max assist to move supine to sit and sit to stand, and max assist for lower body self care secondary to impaired sit to/from stand and impaired standing balance. Note that patient did not need as much help to stand earlier with PT, but states she just returned to bed from sitting up for approximately 3 hours. Patient is alert, oriented, and follows 100% of commands. States she seems to get weak in hospital, but recovers quickly at home. States she has an aide 6 hours a day 7 days a week and has family assist of brother, mother, and father.  Performance impacted by impaired strength and endurance for bed mobility and move sit to/from stand and standing balance (static and dynamic). Recommend rehab versus home with assist 24/7 and [facility] pending progress. Progression toward goals:  [x]       Improving appropriately and progressing toward goals  []       Improving slowly and progressing toward goals  []       Not making progress toward goals and plan of care will be adjusted     PLAN:  Patient continues to benefit from skilled intervention to address the above impairments. Continue treatment per established plan of care. Discharge Recommendations:  Rehab versus home with 24/7 and [facility]  Further Equipment Recommendations for Discharge:  none     SUBJECTIVE:   Patient stated I don't know why I get so weak in the hospital.    OBJECTIVE DATA SUMMARY:   Cognitive/Behavioral Status:  Neurologic State: Alert  Orientation Level: Oriented X4  Cognition: Appropriate decision making; Appropriate for age attention/concentration; Appropriate safety awareness; Follows commands  Perception: Appears intact  Perseveration: No perseveration noted  Safety/Judgement: Awareness of environment    Functional Mobility and Transfers for ADLs:  Bed Mobility:  Supine to Sit: Moderate assistance;Assist x1  Sit to Supine: Supervision;Assist x1  Scooting: Supervision;Assist x1    Transfers:  Sit to Stand: Maximum assistance;Assist x1          Balance:  Sitting: Intact  Standing: Impaired  Standing - Static: Poor; Unsupported  Standing - Dynamic : Good (with rolling walker)    ADL Intervention:            Upper Body Bathing  Bathing Assistance: Supervision/set-up (with pacing)  Position Performed: Seated edge of bed    Lower Body Bathing  Perineal  : Maximum assistance  Position Performed: Standing  Lower Body : Supervision/set-up  Position Performed: Seated edge of bed         Lower Body Dressing Assistance  Pants With Elastic Waist: Moderate assistance  Socks: Supervision/set-up  Leg Crossed Method Used: Yes  Position Performed: Seated edge of bed;Standing         Cognitive Retraining  Safety/Judgement: Awareness of environment    Activity Tolerance:   fair  Please refer to the flowsheet for vital signs taken during this treatment.   After treatment:   [] Patient left in no apparent distress sitting up in chair  [x] Patient left in no apparent distress in bed  [x] Call bell left within reach  [x] Nursing notified  [] Caregiver present  [] Bed alarm activated    COMMUNICATION/COLLABORATION:   The patients plan of care was discussed with: Registered Nurse    AVELINO Sharpe  Time Calculation: 15 mins

## 2018-03-20 ENCOUNTER — PATIENT OUTREACH (OUTPATIENT)
Dept: FAMILY MEDICINE CLINIC | Age: 33
End: 2018-03-20

## 2018-03-20 LAB
BACTERIA SPEC CULT: ABNORMAL
GLUCOSE BLD STRIP.AUTO-MCNC: 108 MG/DL (ref 65–100)
GLUCOSE BLD STRIP.AUTO-MCNC: 112 MG/DL (ref 65–100)
GLUCOSE BLD STRIP.AUTO-MCNC: 118 MG/DL (ref 65–100)
GLUCOSE BLD STRIP.AUTO-MCNC: 125 MG/DL (ref 65–100)
GLUCOSE BLD STRIP.AUTO-MCNC: 49 MG/DL (ref 65–100)
GLUCOSE BLD STRIP.AUTO-MCNC: 54 MG/DL (ref 65–100)
GLUCOSE BLD STRIP.AUTO-MCNC: 73 MG/DL (ref 65–100)
GRAM STN SPEC: ABNORMAL
SERVICE CMNT-IMP: ABNORMAL
SERVICE CMNT-IMP: NORMAL

## 2018-03-20 PROCEDURE — 74011250637 HC RX REV CODE- 250/637: Performed by: HOSPITALIST

## 2018-03-20 PROCEDURE — 74011250637 HC RX REV CODE- 250/637: Performed by: INTERNAL MEDICINE

## 2018-03-20 PROCEDURE — 74011000258 HC RX REV CODE- 258: Performed by: INTERNAL MEDICINE

## 2018-03-20 PROCEDURE — 74011250636 HC RX REV CODE- 250/636: Performed by: INTERNAL MEDICINE

## 2018-03-20 PROCEDURE — 74011000250 HC RX REV CODE- 250: Performed by: HOSPITALIST

## 2018-03-20 PROCEDURE — 94640 AIRWAY INHALATION TREATMENT: CPT

## 2018-03-20 PROCEDURE — 94664 DEMO&/EVAL PT USE INHALER: CPT

## 2018-03-20 PROCEDURE — 77010033678 HC OXYGEN DAILY

## 2018-03-20 PROCEDURE — 74011250636 HC RX REV CODE- 250/636: Performed by: HOSPITALIST

## 2018-03-20 PROCEDURE — 90935 HEMODIALYSIS ONE EVALUATION: CPT

## 2018-03-20 PROCEDURE — 65270000029 HC RM PRIVATE

## 2018-03-20 PROCEDURE — 82962 GLUCOSE BLOOD TEST: CPT

## 2018-03-20 RX ORDER — DEXTROSE MONOHYDRATE AND SODIUM CHLORIDE 5; .9 G/100ML; G/100ML
15 INJECTION, SOLUTION INTRAVENOUS CONTINUOUS
Status: DISCONTINUED | OUTPATIENT
Start: 2018-03-20 | End: 2018-04-20 | Stop reason: HOSPADM

## 2018-03-20 RX ADMIN — ARFORMOTEROL TARTRATE 15 MCG: 15 SOLUTION RESPIRATORY (INHALATION) at 07:05

## 2018-03-20 RX ADMIN — CARVEDILOL 6.25 MG: 6.25 TABLET, FILM COATED ORAL at 18:03

## 2018-03-20 RX ADMIN — Medication 16 G: at 11:56

## 2018-03-20 RX ADMIN — HYDROMORPHONE HYDROCHLORIDE 0.5 MG: 2 INJECTION INTRAMUSCULAR; INTRAVENOUS; SUBCUTANEOUS at 09:23

## 2018-03-20 RX ADMIN — APIXABAN 5 MG: 5 TABLET, FILM COATED ORAL at 18:03

## 2018-03-20 RX ADMIN — BUDESONIDE 500 MCG: 0.5 INHALANT RESPIRATORY (INHALATION) at 07:05

## 2018-03-20 RX ADMIN — HYDROMORPHONE HYDROCHLORIDE 0.5 MG: 2 INJECTION INTRAMUSCULAR; INTRAVENOUS; SUBCUTANEOUS at 13:00

## 2018-03-20 RX ADMIN — ONDANSETRON 4 MG: 2 INJECTION INTRAMUSCULAR; INTRAVENOUS at 14:14

## 2018-03-20 RX ADMIN — PANTOPRAZOLE SODIUM 40 MG: 40 TABLET, DELAYED RELEASE ORAL at 06:07

## 2018-03-20 RX ADMIN — AMITRIPTYLINE HYDROCHLORIDE 25 MG: 10 TABLET, FILM COATED ORAL at 22:37

## 2018-03-20 RX ADMIN — Medication 10 ML: at 14:18

## 2018-03-20 RX ADMIN — HYDROMORPHONE HYDROCHLORIDE 0.5 MG: 2 INJECTION INTRAMUSCULAR; INTRAVENOUS; SUBCUTANEOUS at 23:18

## 2018-03-20 RX ADMIN — ERYTHROPOIETIN 14000 UNITS: 10000 INJECTION, SOLUTION INTRAVENOUS; SUBCUTANEOUS at 18:07

## 2018-03-20 RX ADMIN — Medication 16 G: at 06:07

## 2018-03-20 RX ADMIN — HYDROMORPHONE HYDROCHLORIDE 0.5 MG: 2 INJECTION INTRAMUSCULAR; INTRAVENOUS; SUBCUTANEOUS at 04:37

## 2018-03-20 RX ADMIN — DEXTROSE MONOHYDRATE AND SODIUM CHLORIDE 50 ML/HR: 5; .9 INJECTION, SOLUTION INTRAVENOUS at 12:37

## 2018-03-20 RX ADMIN — ONDANSETRON 4 MG: 2 INJECTION INTRAMUSCULAR; INTRAVENOUS at 20:12

## 2018-03-20 RX ADMIN — Medication 10 ML: at 04:37

## 2018-03-20 RX ADMIN — BUDESONIDE 500 MCG: 0.5 INHALANT RESPIRATORY (INHALATION) at 23:21

## 2018-03-20 RX ADMIN — DEXTROSE MONOHYDRATE 25 G: 500 INJECTION PARENTERAL at 12:18

## 2018-03-20 RX ADMIN — SODIUM CHLORIDE 1 G: 900 INJECTION, SOLUTION INTRAVENOUS at 22:41

## 2018-03-20 RX ADMIN — HYDROMORPHONE HYDROCHLORIDE 0.5 MG: 2 INJECTION INTRAMUSCULAR; INTRAVENOUS; SUBCUTANEOUS at 20:12

## 2018-03-20 RX ADMIN — ARFORMOTEROL TARTRATE 15 MCG: 15 SOLUTION RESPIRATORY (INHALATION) at 23:21

## 2018-03-20 RX ADMIN — HYDROXYCHLOROQUINE SULFATE 200 MG: 200 TABLET, FILM COATED ORAL at 18:03

## 2018-03-20 RX ADMIN — HYDROMORPHONE HYDROCHLORIDE 0.5 MG: 2 INJECTION INTRAMUSCULAR; INTRAVENOUS; SUBCUTANEOUS at 16:37

## 2018-03-20 NOTE — PROGRESS NOTES
Patient name: Hannah Parker  MRN: [id number]    Nephrology Progress note:    Assessment:  ESRD: on HD TTS  E. Coli Bacteremia (Hard copy placed in chart): source intra-abd source/ E. Coli peritonitis. IV zosyn. Repeat Bld Cx 3/11-> Neg. 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  Lower dose       Subjective:  Seen on HD         Exam:  Visit Vitals    BP (!) 131/101    Pulse (!) 106    Temp 98.6 °F (37 °C)    Resp 18    Ht 5' 5\" (1.651 m)    Wt 65.3 kg (144 lb)    SpO2 96%    BMI 23.96 kg/m2     Wt Readings from Last 3 Encounters:   03/20/18 65.3 kg (144 lb)   03/05/18 68 kg (150 lb)   01/29/18 69.4 kg (153 lb)       Intake/Output Summary (Last 24 hours) at 03/20/18 1151  Last data filed at 03/20/18 0835   Gross per 24 hour   Intake              865 ml   Output               75 ml   Net              790 ml     abd tenderness  NAD    Current Facility-Administered Medications   Medication Dose Route Frequency Last Dose    carvedilol (COREG) tablet 6.25 mg  6.25 mg Oral BID WITH MEALS Stopped at 03/20/18 0800    hydrALAZINE (APRESOLINE) 20 mg/mL injection 5 mg  5 mg IntraVENous Q6H PRN      0.9% sodium chloride infusion  50 mL/hr IntraVENous CONTINUOUS 50 mL/hr at 03/19/18 1135    cefepime (MAXIPIME) 1 g in 0.9% sodium chloride (MBP/ADV) 50 mL ADV  1 g IntraVENous Q24H 1 g at 03/19/18 2024    apixaban (ELIQUIS) tablet 5 mg  5 mg Oral BID Stopped at 03/20/18 0900    0.9% sodium chloride infusion 250 mL  250 mL IntraVENous PRN      HYDROmorphone (DILAUDID) injection 0.5 mg  0.5 mg IntraVENous Q3H PRN 0.5 mg at 03/20/18 0923    glucose chewable tablet 16 g  4 Tab Oral PRN 16 g at 03/20/18 2604    dextrose (D50W) injection syrg 12.5-25 g  12.5-25 g IntraVENous PRN 12.5 g at 03/17/18 2306    glucagon (GLUCAGEN) injection 1 mg  1 mg IntraMUSCular PRN      epoetin pia (EPOGEN;PROCRIT) 14,000 Units  14,000 Units SubCUTAneous DIALYSIS TUE, THU & SAT 14,000 Units at 03/17/18 2250    insulin lispro (HUMALOG) injection   SubCUTAneous AC&HS Stopped at 03/12/18 2200    predniSONE (DELTASONE) tablet 5 mg  5 mg Oral DAILY Stopped at 03/20/18 0900    pantoprazole (PROTONIX) tablet 40 mg  40 mg Oral ACB 40 mg at 03/20/18 0607    cyanocobalamin (VITAMIN B12) tablet 2,500 mcg  2,500 mcg Oral DAILY Stopped at 03/20/18 0900    hydroxychloroquine (PLAQUENIL) tablet 200 mg  200 mg Oral BID Stopped at 03/20/18 0900    albuterol (PROVENTIL VENTOLIN) nebulizer solution 2.5 mg  2.5 mg Nebulization Q4H PRN      gemfibrozil (LOPID) tablet 300 mg  300 mg Oral DAILY Stopped at 03/20/18 0900    oxyCODONE IR (ROXICODONE) tablet 2.5-5 mg  2.5-5 mg Oral Q6H PRN 5 mg at 03/18/18 2225    amitriptyline (ELAVIL) tablet 25 mg  25 mg Oral QHS 25 mg at 03/19/18 2259    sodium chloride (NS) flush 5-10 mL  5-10 mL IntraVENous Q8H 10 mL at 03/20/18 0437    sodium chloride (NS) flush 5-10 mL  5-10 mL IntraVENous PRN 10 mL at 03/19/18 2327    ondansetron (ZOFRAN) injection 4 mg  4 mg IntraVENous Q4H PRN 4 mg at 03/18/18 1954    arformoterol (BROVANA) neb solution 15 mcg  15 mcg Nebulization BID RT 15 mcg at 03/20/18 0705    And    budesonide (PULMICORT) 500 mcg/2 ml nebulizer suspension  500 mcg Nebulization BID  mcg at 03/20/18 4617       Labs/Data:    Lab Results   Component Value Date/Time WBC 7.3 03/19/2018 05:35 AM    Hemoglobin (POC) 7.8 (L) 01/19/2018 12:30 PM    HGB 8.9 (L) 03/19/2018 05:35 AM    Hematocrit (POC) 23 (L) 01/19/2018 12:30 PM    HCT 30.5 (L) 03/19/2018 05:35 AM    PLATELET 412 (L) 50/44/9556 05:35 AM    MCV 88.2 03/19/2018 05:35 AM       Lab Results   Component Value Date/Time    Sodium 139 03/19/2018 05:35 AM    Potassium 3.9 03/19/2018 05:35 AM    Chloride 103 03/19/2018 05:35 AM    CO2 28 03/19/2018 05:35 AM    Anion gap 8 03/19/2018 05:35 AM    Glucose 84 03/19/2018 05:35 AM    BUN 21 (H) 03/19/2018 05:35 AM    Creatinine 4.02 (H) 03/19/2018 05:35 AM    BUN/Creatinine ratio 5 (L) 03/19/2018 05:35 AM    GFR est AA 16 (L) 03/19/2018 05:35 AM    GFR est non-AA 13 (L) 03/19/2018 05:35 AM    Calcium 7.9 (L) 03/19/2018 05:35 AM

## 2018-03-20 NOTE — PROGRESS NOTES
Bedside and Verbal shift change report given to Olivier Bee (oncoming nurse) by Kat Che RN (offgoing nurse). Report included the following information SBAR, Kardex, Intake/Output, MAR and Recent Results.

## 2018-03-20 NOTE — PROGRESS NOTES
Patient listed on High Risk report as an admission to [facility] on3/11/18 and currently remains a patient at [facility]. Admitted for Peritonitis. Patient had + culture for E-Coli-Bacteremia, source is intra- abdomen/ E. Coli peritonitis. . Patient has history of ESRD on HD. Repeat Blood cultures on 3/11/18 was neg. Patient also has history of PE on Eliquis. Last HGB 7.8. History of lupus, with anemia 2nd degree to her ESRD. Patient currently on Antibiotic therapy, being followed by Infectious disease. BP today 131/101. Patient given information on advance Directives on 2/21/17 by provider and instructed to call facilitator for more information. Continue to have peritoneal drain. NN will continue to observe for discharge assessment and follow-up.

## 2018-03-20 NOTE — PROGRESS NOTES
Hospitalist Progress Note  Nona White MD  Answering service: [phone] from in house phone         Date of Service:  3/20/2018  NAME:  Wilberto Mera  :  1985  MRN:  [id number]  Admission Summary: This is a 27-year-old female with multiple medical problems including SLE, end-stage renal disease (on hemodialysis, TTS), pulmonary embolism, on Eliquis (), hyperlipidemia, hypertension, recent Candida peritonitis, chronic bilateral pain.  She comes over here because of abdominal pain since last 4 or 5 days.      T         Interval history / Subjective:     3/20:   Pt yet with abd pain but better, no n.v,  Had HD today,   - low albumin met with poor po intake. Has supplements.            Assessment & Plan:      Acute peritonitis  -recent candida peritonitis  -CT abd 3/11 Large amount of free intraperitoneal fluid appears somewhat loculated  -Follow cultures, blood culture 3/11 unremarkable. Peritoneal fluid heavy E coli  -Antibiotics per ID  -Was on Vanc/Zosyn/Fluconazole. Now Vanc/FLuconazole discontinued. Continue Cefepime  -s/p paracentesis, follow studies. With drain in place, draining serosanguinous now  - Repeat CT on 3/17 shows improvement on loculated fluid. Repeat fluid culture and cell count ordered by nephro on 3/17. C/s from 3/18 positive for ecoli , continue cefepime   Off imuran now     E coli bacteremia/Sepsis   that the patient was found to have E coli bacteremia at HD      Hypotension now with HTN  -Takes coreg 12.5 bid at home, now on 6.25,will increase as tolerated. -now resolved  -Was on stress steroids  -Now on regular oral steroids     Thrombocytopenia   -Monitor closely, improving  -Switched Zosyn to cefepime.   Stable at 142     Bilateral pedal edema  -Dopplers negative for DVT  - mild edema 3/20/2018      SLE  -continue home meds  -D/C'd Imuran, allopurinol by nephro with acute infection and thrombocytopenia.      History of PE  -on Eliquis since 2012  See note from previous hosptalist  -Appreciate discussion with PMD 3/13. The patient was seen by hematology in 2013 but seems was lost to follow up. I think at that time the plan was to stop anticoagulation if her SLE is stable. Spoke to Dr Brock Badillo, he feels that the patient can come off Eliquis. His records do not suggest any recurrence of DVT/PE. Spoke to Dr Aspen Villa, he has not seen the patient since >1 yr. If antiphospholipid antibodies negative then the patient can come off Eliquis  -10/17 V/Q high probability for PE  -Per oncology the patient should be continued to Eliquis indefinitely      ESRD  -on HD TTS     Anemia  -of chronic disease  -Transfused 2 units PRBC with HD 3/15  Lab Results   Component Value Date/Time    Hemoglobin (POC) 7.8 (L) 01/19/2018 12:30 PM    HGB 8.9 (L) 03/19/2018 05:35 AM         Severe hepatic steatosis  -monitor     HTN  -stable  BP Readings from Last 1 Encounters:   03/20/18 (!) 114/98         Severe protein calorie malnutrition  -Consult nutrition  -nutritional supp  Lab Results   Component Value Date/Time    Protein, total 5.6 (L) 03/19/2018 05:35 AM    Albumin 1.1 (L) 03/19/2018 05:35 AM          Appreciate Nephro, ID, surgery input.     Renal diet     Code status: FULL CODE  DVT prophylaxis: Eliquis  PTA: home     Plan: Follow ID, nephrology, surgery     Care Plan discussed with: Patient/Family  Disposition: Home w/Family          Hospital Problems  Date Reviewed: 3/11/2018          Codes Class Noted POA    * (Principal)Peritonitis ([facility] [address] ICD-10-CM: K65.9  ICD-9-CM: 567.9  3/11/2018 Yes                Review of Systems:   some abd pain, no n/v/d/, did feel feverish        Vital Signs:    Last 24hrs VS reviewed since prior progress note.  Most recent are:  Visit Vitals    BP (!) 114/98 (BP 1 Location: Left arm, BP Patient Position: At rest)    Pulse (!) 110    Temp 99.2 °F (37.3 °C)    Resp 16    Ht 5' 5\" (1.651 m)    Wt 65.3 kg (144 lb)    SpO2 96%    BMI 23.96 kg/m2         Intake/Output Summary (Last 24 hours) at 03/20/18 1607  Last data filed at 03/20/18 1403   Gross per 24 hour   Intake              640 ml   Output             1675 ml   Net            -1035 ml        Physical Examination:             Constitutional:  No acute distress, cooperative, pleasant    ENT:  Oral mucous moist, oropharynx benign. Neck supple,    Resp:  CTA bilaterally. No wheezing/rhonchi/rales. No accessory muscle use   CV:  Regular rhythm, normal rate, no murmurs, gallops, rubs    GI:  Soft, non distended, non tender. normoactive bowel sounds, no hepatosplenomegaly     Musculoskeletal:  No edema, warm, 2+ pulses throughout    Neurologic:  Moves all extremities. AAOx3, CN II-XII reviewed     Psych:  Good insight, Not anxious nor agitated. Skin:  Good turgor, no rashes or ulcers       Data Review:    Review and/or order of clinical lab test      Labs:     Recent Labs      03/19/18   0535  03/18/18   0306   WBC  7.3  6.4   HGB  8.9*  9.3*   HCT  30.5*  30.1*   PLT  142*  143*     Recent Labs      03/19/18   0535  03/18/18   0306   NA  139  138   K  3.9  3.6   CL  103  101   CO2  28  30   BUN  21*  14   CREA  4.02*  2.77*   GLU  84  81   CA  7.9*  8.3*   MG  1.6  1.9   PHOS  2.7  2.0*   URICA   --   1.6*     Recent Labs      03/19/18   0535   SGOT  17   ALT  <6*   AP  52   TBILI  0.4   TP  5.6*   ALB  1.1*   GLOB  4.5*     No results for input(s): INR, PTP, APTT in the last 72 hours. No lab exists for component: INREXT   No results for input(s): FE, TIBC, PSAT, FERR in the last 72 hours. Lab Results   Component Value Date/Time    Folate 4.1 (L) 03/15/2018 10:32 AM      No results for input(s): PH, PCO2, PO2 in the last 72 hours. No results for input(s): CPK, CKNDX, TROIQ in the last 72 hours.     No lab exists for component: CPKMB  Lab Results   Component Value Date/Time    Cholesterol, total 117 09/25/2015 02:02 PM    HDL Cholesterol 31 (L) 09/25/2015 02:02 PM    LDL, calculated 57 09/25/2015 02:02 PM    Triglyceride 146 09/25/2015 02:02 PM    CHOL/HDL Ratio 7.7 (H) 05/31/2009 10:30 AM     Lab Results   Component Value Date/Time    Glucose (POC) 125 (H) 03/20/2018 12:31 PM    Glucose (POC) 49 (LL) 03/20/2018 12:11 PM    Glucose (POC) 54 (L) 03/20/2018 11:51 AM    Glucose (POC) 108 (H) 03/20/2018 06:35 AM    Glucose (POC) 73 03/20/2018 05:57 AM     Lab Results   Component Value Date/Time    Color YELLOW/STRAW 08/12/2016 09:06 PM    Appearance CLEAR 08/12/2016 09:06 PM    Specific gravity 1.017 08/12/2016 09:06 PM    Specific gravity 1.010 07/11/2016 12:44 PM    pH (UA) 7.0 08/12/2016 09:06 PM    Protein 300 (A) 08/12/2016 09:06 PM    Glucose NEGATIVE  08/12/2016 09:06 PM    Ketone TRACE (A) 08/12/2016 09:06 PM    Bilirubin NEGATIVE  07/11/2016 12:44 PM    Urobilinogen 1.0 08/12/2016 09:06 PM    Nitrites NEGATIVE  08/12/2016 09:06 PM    Leukocyte Esterase NEGATIVE  08/12/2016 09:06 PM    Epithelial cells MODERATE (A) 08/12/2016 09:06 PM    Bacteria 1+ (A) 08/12/2016 09:06 PM    WBC 0-4 08/12/2016 09:06 PM    RBC 0-5 08/12/2016 09:06 PM         Medications Reviewed:     Current Facility-Administered Medications   Medication Dose Route Frequency    dextrose 5% and 0.9% NaCl infusion  50 mL/hr IntraVENous CONTINUOUS    carvedilol (COREG) tablet 6.25 mg  6.25 mg Oral BID WITH MEALS    hydrALAZINE (APRESOLINE) 20 mg/mL injection 5 mg  5 mg IntraVENous Q6H PRN    cefepime (MAXIPIME) 1 g in 0.9% sodium chloride (MBP/ADV) 50 mL ADV  1 g IntraVENous Q24H    apixaban (ELIQUIS) tablet 5 mg  5 mg Oral BID    0.9% sodium chloride infusion 250 mL  250 mL IntraVENous PRN    HYDROmorphone (DILAUDID) injection 0.5 mg  0.5 mg IntraVENous Q3H PRN    glucose chewable tablet 16 g  4 Tab Oral PRN    dextrose (D50W) injection syrg 12.5-25 g  12.5-25 g IntraVENous PRN    glucagon (GLUCAGEN) injection 1 mg  1 mg IntraMUSCular PRN    epoetin pia (EPOGEN;PROCRIT) 14,000 Units  14,000 Units SubCUTAneous DIALYSIS TUE, THU & SAT    insulin lispro (HUMALOG) injection   SubCUTAneous AC&HS    predniSONE (DELTASONE) tablet 5 mg  5 mg Oral DAILY    pantoprazole (PROTONIX) tablet 40 mg  40 mg Oral ACB    cyanocobalamin (VITAMIN B12) tablet 2,500 mcg  2,500 mcg Oral DAILY    hydroxychloroquine (PLAQUENIL) tablet 200 mg  200 mg Oral BID    albuterol (PROVENTIL VENTOLIN) nebulizer solution 2.5 mg  2.5 mg Nebulization Q4H PRN    gemfibrozil (LOPID) tablet 300 mg  300 mg Oral DAILY    oxyCODONE IR (ROXICODONE) tablet 2.5-5 mg  2.5-5 mg Oral Q6H PRN    amitriptyline (ELAVIL) tablet 25 mg  25 mg Oral QHS    sodium chloride (NS) flush 5-10 mL  5-10 mL IntraVENous Q8H    sodium chloride (NS) flush 5-10 mL  5-10 mL IntraVENous PRN    ondansetron (ZOFRAN) injection 4 mg  4 mg IntraVENous Q4H PRN    arformoterol (BROVANA) neb solution 15 mcg  15 mcg Nebulization BID RT    And    budesonide (PULMICORT) 500 mcg/2 ml nebulizer suspension  500 mcg Nebulization BID RT     ______________________________________________________________________  EXPECTED LENGTH OF STAY: 4d 21h  ACTUAL LENGTH OF STAY:          9                 Princess Humberto MD

## 2018-03-20 NOTE — DIABETES MGMT
DTC Progress Note    Recommendations/ Comments: Chart review for severe hypoglycemia - 54 mg/dl at 1151 today  and  49 mg/dL at 1211 today. FBS this morning was also low (73 mg/dl). Noted pt's renal status and poor po intake. Noted D5 is running. Current hospital DM medication: lispro correction scael, high sensitivity    Chart reviewed on [facility]. Patient is a 29 y.o. female with no hx DM. ESRD    A1c:   Lab Results   Component Value Date/Time    Hemoglobin A1c 5.4 09/25/2015 02:02 PM    Hemoglobin A1c 5.3 03/07/2014 02:16 PM       Recent Glucose Results:   Lab Results   Component Value Date/Time    GLUCPOC 125 (H) 03/20/2018 12:31 PM    GLUCPOC 49 (LL) 03/20/2018 12:11 PM    GLUCPOC 54 (L) 03/20/2018 11:51 AM        Lab Results   Component Value Date/Time    Creatinine 4.02 (H) 03/19/2018 05:35 AM     Estimated Creatinine Clearance: 18.1 mL/min (based on Cr of 4.02). Active Orders   Diet    DIET REGULAR        PO intake:   Patient Vitals for the past 72 hrs:   % Diet Eaten   03/19/18 2301 25 %   03/19/18 1200 25 %   03/19/18 0800 25 %       Will continue to follow as needed.     Thank you  Sarahi Parker RD, CDE

## 2018-03-20 NOTE — PROCEDURES
[facility]  [phone]    Vitals   Pre   Post   Assessment   Pre   Post     Temp  Temp: 98.6 °F (37 °C) (03/20/18 0438)  99.9 LOC  A&OX4 A&OX4   HR   Pulse (Heart Rate): (!) 110 (03/20/18 0835) 112 Lungs   clear  clear   B/P   BP: 116/89 (03/20/18 0835) 142/107 Cardiac   Sinus tach  sinus tach   Resp   Resp Rate: 16 (03/20/18 0835) 18 Skin   intact  intact   Pain level  Pain Intensity 1: 8 (03/20/18 0438) 0 Edema    none   none   Orders:    Duration:   Start:   Procedure Start Time: 4376 End:    1391 Total:   3.5 hours   Dialyzer:   Dialyzer/Set Up Inspection: Cortez Garcia (03/20/18 0835)   K Bath:   Dialysate K (mEq/L): 3 (03/20/18 0835)   Ca Bath:   Dialysate CA (mEq/L): 2.5 (03/20/18 0835)   Na/Bicarb:   Dialysate NA (mEq/L): 140 (03/20/18 0835)   Target Fluid Removal:   Goal/Amount of Fluid to Remove (mL): 1500 mL (03/20/18 0835)   Access     Type & Location:   PETER AVG, 15 gauge needles used. Post tx; Sites held 10 minutes, bleeding stopped. Labs     Obtained/Reviewed   Critical Results Called   Date when labs were drawn-  Hgb-    HGB   Date Value Ref Range Status   03/19/2018 8.9 (L) 11.5 - 16.0 g/dL Final     K-    Potassium   Date Value Ref Range Status   03/19/2018 3.9 3.5 - 5.1 mmol/L Final     Ca-   Calcium   Date Value Ref Range Status   03/19/2018 7.9 (L) 8.5 - 10.1 MG/DL Final     Bun-   BUN   Date Value Ref Range Status   03/19/2018 21 (H) 6 - 20 MG/DL Final     Creat-   Creatinine   Date Value Ref Range Status   03/19/2018 4.02 (H) 0.55 - 1.02 MG/DL Final     Comment:     INVESTIGATED PER DELTA CHECK PROTOCOL        Medications/ Blood Products Given     Name   Dose   Route and Time     none                Blood Volume Processed (BVP):   88.8 liters Net Fluid   Removed:  1500 ml   Comments  Patient tolerated tx well, no complaints during or after. Report given to 5W nurse, [facility] RN, using SBAR.   Time Out Done: yes, 1838  Primary Nurse Rpt Pre: Indira Phan RN  Primary Nurse Rpt Post: Kevin Eastman RN  Pt Education: Renal diet  Care Plan: HD as ordered  Tx Summary: 3.5 hours on 3 k 2.5 Ca removed   Admiting Diagnosis:  Pt's previous clinic-[facility]  Consent signed - Informed Consent Verified: Yes (03/20/18 1674)  [facility] Consent - yes  Hepatitis Status- antigen negative 12-14-17, antibody 10 1-13-18, immune  Machine #- Machine Number: [id number] (03/20/18 0650)  Telemetry status-yes  Pre-dialysis wt. - Pre-Dialysis Weight: 61 kg (134 lb 7.7 oz) (03/17/18 1772)

## 2018-03-20 NOTE — PROGRESS NOTES
HYPOGLYCEMIC EPISODE DOCUMENTATION    Patient with hypoglycemic episode(s) at 1151 (time) on 3/20/18 (date). BG value(s) pre-treatment 47    Was patient symptomatic? [] yes, [x] no  Patient was treated with the following rescue medications/treatments: [] D50                [x] Glucose tablets                [] Glucagon                [] 4oz juice                [] 6oz reg soda                [] 8oz low fat milk  BG value post-treatment: 49, treated with D50. BG value post-treatment: 125  Once BG treated and value greater than 80mg/dl, pt was provided with the following:  [] snack  [x] meal  Name of MD notified: Danilo Wright   The following orders were received: D5 and 0.9% NaCl @ 50 mL/hr because of ongoing hypoglycemia episodes and poor appetite.

## 2018-03-21 ENCOUNTER — APPOINTMENT (OUTPATIENT)
Dept: GENERAL RADIOLOGY | Age: 33
DRG: 853 | End: 2018-03-21
Attending: INTERNAL MEDICINE
Payer: MEDICARE

## 2018-03-21 LAB
GLUCOSE BLD STRIP.AUTO-MCNC: 68 MG/DL (ref 65–100)
GLUCOSE BLD STRIP.AUTO-MCNC: 80 MG/DL (ref 65–100)
GLUCOSE BLD STRIP.AUTO-MCNC: 89 MG/DL (ref 65–100)
GLUCOSE BLD STRIP.AUTO-MCNC: 90 MG/DL (ref 65–100)
GLUCOSE BLD STRIP.AUTO-MCNC: 95 MG/DL (ref 65–100)
GLUCOSE BLD STRIP.AUTO-MCNC: 96 MG/DL (ref 65–100)
SERVICE CMNT-IMP: NORMAL

## 2018-03-21 PROCEDURE — 82962 GLUCOSE BLOOD TEST: CPT

## 2018-03-21 PROCEDURE — 74011250637 HC RX REV CODE- 250/637: Performed by: HOSPITALIST

## 2018-03-21 PROCEDURE — 94664 DEMO&/EVAL PT USE INHALER: CPT

## 2018-03-21 PROCEDURE — 94640 AIRWAY INHALATION TREATMENT: CPT

## 2018-03-21 PROCEDURE — 74011000258 HC RX REV CODE- 258: Performed by: INTERNAL MEDICINE

## 2018-03-21 PROCEDURE — 77010033678 HC OXYGEN DAILY

## 2018-03-21 PROCEDURE — 74011636637 HC RX REV CODE- 636/637: Performed by: HOSPITALIST

## 2018-03-21 PROCEDURE — 74011000250 HC RX REV CODE- 250: Performed by: HOSPITALIST

## 2018-03-21 PROCEDURE — 65270000029 HC RM PRIVATE

## 2018-03-21 PROCEDURE — 74011250636 HC RX REV CODE- 250/636: Performed by: HOSPITALIST

## 2018-03-21 PROCEDURE — 71045 X-RAY EXAM CHEST 1 VIEW: CPT

## 2018-03-21 PROCEDURE — 97116 GAIT TRAINING THERAPY: CPT

## 2018-03-21 PROCEDURE — 74011250636 HC RX REV CODE- 250/636: Performed by: INTERNAL MEDICINE

## 2018-03-21 PROCEDURE — 74011250637 HC RX REV CODE- 250/637: Performed by: INTERNAL MEDICINE

## 2018-03-21 RX ADMIN — CARVEDILOL 6.25 MG: 6.25 TABLET, FILM COATED ORAL at 18:59

## 2018-03-21 RX ADMIN — HYDROMORPHONE HYDROCHLORIDE 0.5 MG: 2 INJECTION INTRAMUSCULAR; INTRAVENOUS; SUBCUTANEOUS at 11:47

## 2018-03-21 RX ADMIN — HYDROXYCHLOROQUINE SULFATE 200 MG: 200 TABLET, FILM COATED ORAL at 08:46

## 2018-03-21 RX ADMIN — BUDESONIDE 500 MCG: 0.5 INHALANT RESPIRATORY (INHALATION) at 09:06

## 2018-03-21 RX ADMIN — ONDANSETRON 4 MG: 2 INJECTION INTRAMUSCULAR; INTRAVENOUS at 14:39

## 2018-03-21 RX ADMIN — ARFORMOTEROL TARTRATE 15 MCG: 15 SOLUTION RESPIRATORY (INHALATION) at 09:06

## 2018-03-21 RX ADMIN — Medication 10 ML: at 14:43

## 2018-03-21 RX ADMIN — BUDESONIDE 500 MCG: 0.5 INHALANT RESPIRATORY (INHALATION) at 23:00

## 2018-03-21 RX ADMIN — CARVEDILOL 6.25 MG: 6.25 TABLET, FILM COATED ORAL at 08:46

## 2018-03-21 RX ADMIN — HYDROMORPHONE HYDROCHLORIDE 0.5 MG: 2 INJECTION INTRAMUSCULAR; INTRAVENOUS; SUBCUTANEOUS at 08:13

## 2018-03-21 RX ADMIN — HYDROMORPHONE HYDROCHLORIDE 0.5 MG: 2 INJECTION INTRAMUSCULAR; INTRAVENOUS; SUBCUTANEOUS at 18:23

## 2018-03-21 RX ADMIN — HYDROXYCHLOROQUINE SULFATE 200 MG: 200 TABLET, FILM COATED ORAL at 18:24

## 2018-03-21 RX ADMIN — AMITRIPTYLINE HYDROCHLORIDE 25 MG: 10 TABLET, FILM COATED ORAL at 22:00

## 2018-03-21 RX ADMIN — DEXTROSE MONOHYDRATE AND SODIUM CHLORIDE 50 ML/HR: 5; .9 INJECTION, SOLUTION INTRAVENOUS at 08:48

## 2018-03-21 RX ADMIN — HYDROMORPHONE HYDROCHLORIDE 0.5 MG: 2 INJECTION INTRAMUSCULAR; INTRAVENOUS; SUBCUTANEOUS at 22:00

## 2018-03-21 RX ADMIN — SODIUM CHLORIDE 1 G: 900 INJECTION, SOLUTION INTRAVENOUS at 20:18

## 2018-03-21 RX ADMIN — HYDROMORPHONE HYDROCHLORIDE 0.5 MG: 2 INJECTION INTRAMUSCULAR; INTRAVENOUS; SUBCUTANEOUS at 14:39

## 2018-03-21 RX ADMIN — PREDNISONE 5 MG: 5 TABLET ORAL at 08:46

## 2018-03-21 RX ADMIN — GEMFIBROZIL 300 MG: 600 TABLET ORAL at 08:46

## 2018-03-21 RX ADMIN — ARFORMOTEROL TARTRATE 15 MCG: 15 SOLUTION RESPIRATORY (INHALATION) at 23:00

## 2018-03-21 RX ADMIN — APIXABAN 5 MG: 5 TABLET, FILM COATED ORAL at 08:46

## 2018-03-21 RX ADMIN — PANTOPRAZOLE SODIUM 40 MG: 40 TABLET, DELAYED RELEASE ORAL at 08:12

## 2018-03-21 RX ADMIN — APIXABAN 5 MG: 5 TABLET, FILM COATED ORAL at 18:24

## 2018-03-21 RX ADMIN — Medication 2500 MCG: at 08:45

## 2018-03-21 NOTE — PROGRESS NOTES
Wilberto Mera  is a 29 y. o.female  with a history of SLE, lupus nephritis leading to ESRD //HTN/ treated for candida peritonitis in Dec 2017  Is admitted with abdominal pain and fever since last Thursday  Pt was in [facility] in Dec 2017 for candida peritonitis and PD was stopped and she was started on HD thru a catheter. She took 4 weeks of fluconazole and was doing fine She had a graft placed on the rt arm on 1/19/19 - She developed fever and abdominal pain on 3/6 and blood culture sent from the catheter at dialysis center- The catheter was removed on 3/7 and the graft was accessed last week-   She was given IV gent  at the dialysis center.  As the abdominal pain was getting worse she came to the ED on 3/11 and was admitted  CT abdomen showed Large amount of free intraperitoneal fluid that is  loculated.         Multiple hospitalizations in the past few months  OCt 14-17 /2017 for left lower lobe pneumonia/effusion- was found to have PE on the rt side  10/27-10/30 possible pneumonia- same infiltrate left lower lobe- sent on augmentin     11/26-/11/28 for b/l leg swelling and abdominal swelling     12/12/17-12/22/17- fungal peritonitis- treated with fluconazole for 4 weeks     She had  peritoneal dialysis since 2015- had cath placed in 2015 until it was removed in Dec 2017      subjective  Has some abdominal pain  Objective:   VITALS:    Patient Vitals for the past 12 hrs:   Temp Pulse Resp BP SpO2   03/21/18 1437 98.5 °F (36.9 °C) (!) 101 20 (!) 119/92 97 %   03/21/18 0835 99.1 °F (37.3 °C) (!) 112 18 (!) 118/94 95 %   PHYSICAL EXAM:   General:                   no distress  Lungs:                       B/l air entry     Heart:                                  s1s2  Abdomen:                  Drain -purulent reddish fluid  Tender lower abdomen  Extremities:               Rt arm graft   Skin:                                    Dry skin- striae over arms/abdomen  Lymph:                      Cervical, supraclavicular normal.  Neurologic:                Grossly non-focal     Pertinent Labs  Wbc 2.5>6.4   Hb 8.4  PLT 91  Lactate 1.5  Albumin 1.5  Peritoneal fluid- 3524 WBC ( 85% N)  E.coli     IMAGING RESULTS:       Impression/Recommendation  33 yr female with SLE/ lupus nephritis/HTN -h/o peritoneal dialysis until Dec 2017 /Candida peritonitis in Dec 2017/ treated with 4 weeks of fluconazole     Admitted with abdominal pain and fever of  5 days     E.coli  peritonitis with e.coli bacteremia  Loculated peritoneal fluid- has a drain- repeat imaging from 3/17 still shows a collection but significantly reduced- Will have to make sure that it is completely drained -150 cc yesterday- if <15cc then can repeat another CT  On cefepime -      Recently treated candida peritonitis in Dec 2017- that time she was on PD     SLE-on imuran/plaquenil and prednisone  Daylene Vijay has been discontinued     Discussed with patient

## 2018-03-21 NOTE — PROGRESS NOTES
Hospitalist Progress Note  Ac Jacobsen MD  Answering service: [phone] from in house phone         Date of Service:  3/21/2018  NAME:  Bautista Van  :  1985  MRN:  [id number]  Admission Summary: This is a 27-year-old female with multiple medical problems including SLE, end-stage renal disease (on hemodialysis, TTS), pulmonary embolism, on Eliquis (), hyperlipidemia, hypertension, recent Candida peritonitis, chronic bilateral pain.  She comes over here because of abdominal pain since last 4 or 5 days.      T         Interval history / Subjective:     3/20:   Pt yet with abd pain but better, no n.v,  Had HD today,   - low albumin met with poor po intake. Has supplements. 3/21:  A bit more swollen , cont with lower abd pain, but eating ( little ) no new fever spikes.            Assessment & Plan:      Acute peritonitis  -recent candida peritonitis  -CT abd 3/11 Large amount of free intraperitoneal fluid appears somewhat loculated  -Follow cultures, blood culture 3/11 unremarkable. Peritoneal fluid heavy E coli  -Antibiotics per ID  -Was on Vanc/Zosyn/Fluconazole. Now Vanc/FLuconazole discontinued. Continue Cefepime  -s/p paracentesis, follow studies. With drain in place, draining serosanguinous now  - Repeat CT on 3/17 shows improvement on loculated fluid. Repeat fluid culture and cell count ordered by nephro on 3/17. C/s from 3/18 positive for ecoli , continue cefepime   Off imuran now     E coli bacteremia/Sepsis   that the patient was found to have E coli bacteremia at HD      Hypotension now with HTN  -Takes coreg 12.5 bid at home, now on 6.25,will increase as tolerated. -now resolved  -Was on stress steroids  -Now on regular oral steroids     Thrombocytopenia   -Monitor closely, improving  -Switched Zosyn to cefepime.   Stable at 142     Bilateral pedal edema  -Dopplers negative for DVT  - mild edema 3/20/2018    SLE  -continue home meds  -D/C'd Imuran, allopurinol by nephro with acute infection and thrombocytopenia.      History of PE  -on Eliquis since 2012  See note from previous hosptalist  -Appreciate discussion with PMD 3/13. The patient was seen by hematology in 2013 but seems was lost to follow up. I think at that time the plan was to stop anticoagulation if her SLE is stable. Spoke to Dr Etta Pal, he feels that the patient can come off Eliquis. His records do not suggest any recurrence of DVT/PE. Spoke to Dr Katya Stevenson, he has not seen the patient since >1 yr. If antiphospholipid antibodies negative then the patient can come off Eliquis  -10/17 V/Q high probability for PE  -Per oncology the patient should be continued to Eliquis indefinitely      ESRD  -on HD TTS     Anemia  -of chronic disease  -Transfused 2 units PRBC with HD 3/15  Lab Results   Component Value Date/Time    Hemoglobin (POC) 7.8 (L) 01/19/2018 12:30 PM    HGB 8.9 (L) 03/19/2018 05:35 AM         Severe hepatic steatosis  -monitor     HTN  -stable  BP Readings from Last 1 Encounters:   03/21/18 (!) 119/92         Severe protein calorie malnutrition  -Consult nutrition  -nutritional supp  Lab Results   Component Value Date/Time    Protein, total 5.6 (L) 03/19/2018 05:35 AM    Albumin 1.1 (L) 03/19/2018 05:35 AM          Appreciate Nephro, ID, surgery input.     Renal diet     Code status: FULL CODE  DVT prophylaxis: Eliquis  PTA: home     Plan: Follow ID, nephrology, surgery     Care Plan discussed with: Patient/Family  Disposition: Home w/Family          Hospital Problems  Date Reviewed: 3/11/2018          Codes Class Noted POA    * (Principal)Peritonitis ([facility] [address] ICD-10-CM: K65.9  ICD-9-CM: 567.9  3/11/2018 Yes                Review of Systems:   some abd pain, no n/v/d/, did feel feverish  little different over past 24 hours. Vital Signs:    Last 24hrs VS reviewed since prior progress note.  Most recent are:  Visit Vitals    BP (!) 119/92 (BP 1 Location: Left arm, BP Patient Position: At rest)    Pulse (!) 101    Temp 98.5 °F (36.9 °C)    Resp 20    Ht 5' 5\" (1.651 m)    Wt 66.7 kg (147 lb)    SpO2 97%    BMI 24.46 kg/m2         Intake/Output Summary (Last 24 hours) at 03/21/18 1503  Last data filed at 03/21/18 1437   Gross per 24 hour   Intake                0 ml   Output              125 ml   Net             -125 ml        Physical Examination:             Constitutional:  No acute distress, cooperative, pleasant    ENT:  Oral mucous moist, oropharynx benign. Neck supple,    Resp:  CTA bilaterally. No wheezing/rhonchi/rales. No accessory muscle use   CV:  Regular rhythm, normal rate, no murmurs, gallops, rubs    GI:  Soft, non distended, mild lower abd tender. normoactive bowel sounds, no hepatosplenomegaly     Musculoskeletal:  No edema, warm, 2+ pulses throughout    Neurologic:  Moves all extremities. AAOx3, CN II-XII reviewed     Psych:  Good insight, Not anxious nor agitated. Skin:  Good turgor, no rashes or ulcers       Data Review:    Review and/or order of clinical lab test      Labs:     Recent Labs      03/19/18   0535   WBC  7.3   HGB  8.9*   HCT  30.5*   PLT  142*     Recent Labs      03/19/18   0535   NA  139   K  3.9   CL  103   CO2  28   BUN  21*   CREA  4.02*   GLU  84   CA  7.9*   MG  1.6   PHOS  2.7     Recent Labs      03/19/18   0535   SGOT  17   ALT  <6*   AP  52   TBILI  0.4   TP  5.6*   ALB  1.1*   GLOB  4.5*     No results for input(s): INR, PTP, APTT in the last 72 hours. No lab exists for component: INREXT, INREXT   No results for input(s): FE, TIBC, PSAT, FERR in the last 72 hours. Lab Results   Component Value Date/Time    Folate 4.1 (L) 03/15/2018 10:32 AM      No results for input(s): PH, PCO2, PO2 in the last 72 hours. No results for input(s): CPK, CKNDX, TROIQ in the last 72 hours.     No lab exists for component: CPKMB  Lab Results   Component Value Date/Time    Cholesterol, total 117 09/25/2015 02:02 PM    HDL Cholesterol 31 (L) 09/25/2015 02:02 PM    LDL, calculated 57 09/25/2015 02:02 PM    Triglyceride 146 09/25/2015 02:02 PM    CHOL/HDL Ratio 7.7 (H) 05/31/2009 10:30 AM     Lab Results   Component Value Date/Time    Glucose (POC) 90 03/21/2018 11:55 AM    Glucose (POC) 80 03/21/2018 11:35 AM    Glucose (POC) 89 03/21/2018 07:59 AM    Glucose (POC) 112 (H) 03/20/2018 09:47 PM    Glucose (POC) 118 (H) 03/20/2018 04:53 PM     Lab Results   Component Value Date/Time    Color YELLOW/STRAW 08/12/2016 09:06 PM    Appearance CLEAR 08/12/2016 09:06 PM    Specific gravity 1.017 08/12/2016 09:06 PM    Specific gravity 1.010 07/11/2016 12:44 PM    pH (UA) 7.0 08/12/2016 09:06 PM    Protein 300 (A) 08/12/2016 09:06 PM    Glucose NEGATIVE  08/12/2016 09:06 PM    Ketone TRACE (A) 08/12/2016 09:06 PM    Bilirubin NEGATIVE  07/11/2016 12:44 PM    Urobilinogen 1.0 08/12/2016 09:06 PM    Nitrites NEGATIVE  08/12/2016 09:06 PM    Leukocyte Esterase NEGATIVE  08/12/2016 09:06 PM    Epithelial cells MODERATE (A) 08/12/2016 09:06 PM    Bacteria 1+ (A) 08/12/2016 09:06 PM    WBC 0-4 08/12/2016 09:06 PM    RBC 0-5 08/12/2016 09:06 PM         Medications Reviewed:     Current Facility-Administered Medications   Medication Dose Route Frequency    dextrose 5% and 0.9% NaCl infusion  50 mL/hr IntraVENous CONTINUOUS    carvedilol (COREG) tablet 6.25 mg  6.25 mg Oral BID WITH MEALS    hydrALAZINE (APRESOLINE) 20 mg/mL injection 5 mg  5 mg IntraVENous Q6H PRN    cefepime (MAXIPIME) 1 g in 0.9% sodium chloride (MBP/ADV) 50 mL ADV  1 g IntraVENous Q24H    apixaban (ELIQUIS) tablet 5 mg  5 mg Oral BID    0.9% sodium chloride infusion 250 mL  250 mL IntraVENous PRN    HYDROmorphone (DILAUDID) injection 0.5 mg  0.5 mg IntraVENous Q3H PRN    glucose chewable tablet 16 g  4 Tab Oral PRN    dextrose (D50W) injection syrg 12.5-25 g  12.5-25 g IntraVENous PRN    glucagon (GLUCAGEN) injection 1 mg  1 mg IntraMUSCular PRN    epoetin pia (EPOGEN;PROCRIT) 14,000 Units  14,000 Units SubCUTAneous DIALYSIS TUE, THU & SAT    insulin lispro (HUMALOG) injection   SubCUTAneous AC&HS    predniSONE (DELTASONE) tablet 5 mg  5 mg Oral DAILY    pantoprazole (PROTONIX) tablet 40 mg  40 mg Oral ACB    cyanocobalamin (VITAMIN B12) tablet 2,500 mcg  2,500 mcg Oral DAILY    hydroxychloroquine (PLAQUENIL) tablet 200 mg  200 mg Oral BID    albuterol (PROVENTIL VENTOLIN) nebulizer solution 2.5 mg  2.5 mg Nebulization Q4H PRN    gemfibrozil (LOPID) tablet 300 mg  300 mg Oral DAILY    oxyCODONE IR (ROXICODONE) tablet 2.5-5 mg  2.5-5 mg Oral Q6H PRN    amitriptyline (ELAVIL) tablet 25 mg  25 mg Oral QHS    sodium chloride (NS) flush 5-10 mL  5-10 mL IntraVENous Q8H    sodium chloride (NS) flush 5-10 mL  5-10 mL IntraVENous PRN    ondansetron (ZOFRAN) injection 4 mg  4 mg IntraVENous Q4H PRN    arformoterol (BROVANA) neb solution 15 mcg  15 mcg Nebulization BID RT    And    budesonide (PULMICORT) 500 mcg/2 ml nebulizer suspension  500 mcg Nebulization BID RT     ______________________________________________________________________  EXPECTED LENGTH OF STAY: 4d 21h  ACTUAL LENGTH OF STAY:          10                 Cristiana Tan MD

## 2018-03-21 NOTE — ROUTINE PROCESS
Bedside shift change report given to Capo Muniz (oncoming nurse) by Narcisa Chahal (offgoing nurse). Report included the following information SBAR, Kardex, ED Summary, Procedure Summary, Intake/Output and MAR.

## 2018-03-21 NOTE — PROGRESS NOTES
Occupational Therapy Note 7325    S: \"I'll put the commode over the toilet tomorrow\"    O/A: Patient received supine in bed, reporting she has been able to increase her functional mobility with PT. Overall supervision using RW for 125 feet during last session. Patient has been getting up to [facility], discussed goal for placing BSC frame over bathroom toilet to increase patient's functional mobility as well as prep for home setup. Patient indicating understanding for tomorrow. Recommend with RN or PT patient having BSC placed over toilet tomorrow, 3/22/2018. Patient declining any further participation in ADLs. Issued energy conservation handout to maximize patient's tolerance and safety when returning home. P: Patient with no further OT needs as she is overall at her baseline and will be returning home with 24/7 family assist and aide assist 7 days/week, 6 hours/day. Recommend with nursing patient to complete as able in order to maintain strength, endurance and independence: ADLs with supervision/setup, OOB to chair 3x/day and mobilizing to the bathroom for toileting with RW assist. Thank you for your assistance.      Thien Peña OT  Time spent with patient: 6 minutes

## 2018-03-21 NOTE — PROGRESS NOTES
Patient name: Delmis Enciso  MRN: [id number]    Nephrology Progress note:    Assessment:  ESRD: on HD TTS  E. Coli Bacteremia (Hard copy placed in chart): source intra-abd source/ E. Coli peritonitis. IV zosyn. Repeat Bld Cx 3/11-> Neg. Recent hx of Candida peritonitis  HTN:  Hx of PE: on Eliquis  Lupus: Anemia 2 to ESRD/Lupus: Hgb below goal  SHPT  Protein malnutrition  Hypokalemia    Plan/Recommendations:  HD  Tomorrow  Labs in AM  AB  At risk of bleed while on eliquis;if to be  be used ?  Lower dose       Subjective:   resting         Exam:  Visit Vitals    BP (!) 118/94 (BP 1 Location: Left arm, BP Patient Position: At rest)    Pulse (!) 112    Temp 99.1 °F (37.3 °C)    Resp 18    Ht 5' 5\" (1.651 m)    Wt 66.7 kg (147 lb)    SpO2 95%    BMI 24.46 kg/m2     Wt Readings from Last 3 Encounters:   03/21/18 66.7 kg (147 lb)   03/05/18 68 kg (150 lb)   01/29/18 69.4 kg (153 lb)       Intake/Output Summary (Last 24 hours) at 03/21/18 1405  Last data filed at 03/20/18 2246   Gross per 24 hour   Intake                0 ml   Output               50 ml   Net              -50 ml     resting      Labs/Data:    Lab Results   Component Value Date/Time    WBC 7.3 03/19/2018 05:35 AM    Hemoglobin (POC) 7.8 (L) 01/19/2018 12:30 PM    HGB 8.9 (L) 03/19/2018 05:35 AM    Hematocrit (POC) 23 (L) 01/19/2018 12:30 PM    HCT 30.5 (L) 03/19/2018 05:35 AM    PLATELET 796 (L) 35/48/0699 05:35 AM    MCV 88.2 03/19/2018 05:35 AM       Lab Results   Component Value Date/Time    Sodium 139 03/19/2018 05:35 AM    Potassium 3.9 03/19/2018 05:35 AM    Chloride 103 03/19/2018 05:35 AM    CO2 28 03/19/2018 05:35 AM    Anion gap 8 03/19/2018 05:35 AM    Glucose 84 03/19/2018 05:35 AM    BUN 21 (H) 03/19/2018 05:35 AM    Creatinine 4.02 (H) 03/19/2018 05:35 AM    BUN/Creatinine ratio 5 (L) 03/19/2018 05:35 AM    GFR est AA 16 (L) 03/19/2018 05:35 AM    GFR est non-AA 13 (L) 03/19/2018 05:35 AM    Calcium 7.9 (L) 03/19/2018 05:35 AM

## 2018-03-21 NOTE — PROGRESS NOTES
Problem: Mobility Impaired (Adult and Pediatric)  Goal: *Acute Goals and Plan of Care (Insert Text)  Physical Therapy Goals  Initiated 3/16/2018  1. Patient will move from supine to sit and sit to supine  in bed with supervision/set-up within 7 day(s). 2.  Patient will transfer from bed to chair and chair to bed with supervision/set-up using the least restrictive device within 7 day(s). 3.  Patient will perform sit to stand with minimal assistance/contact guard assist within 7 day(s). 4.  Patient will ambulate with minimal assistance/contact guard assist for 50 feet with the least restrictive device within 7 day(s). physical Therapy TREATMENT  Patient: Patricia Jason (84 y.o. female)  Date: 3/21/2018  Diagnosis: Peritonitis ([address] Peritonitis ([address]       Precautions: Fall (abd drain)  Chart, physical therapy assessment, plan of care and goals were reviewed. ASSESSMENT:  Pt is making steady progress, completes bed mobility with modified independence, sit to stand with minimal assist, and tolerated ambulation with rolling walker x 150 feet with CGA, pt has decreased endurance and took 1 brief standing rest break due to fatigue, overall strength and endurance are improving, pt lives with her elderly grandmother, 6year old daughter, her mom, dad, and brother, pt also has home care aide daily for 6 hours, pt is appropriate to return home with home health   Progression toward goals:  [x]    Improving appropriately and progressing toward goals  []    Improving slowly and progressing toward goals  []    Not making progress toward goals and plan of care will be adjusted     PLAN:  Patient continues to benefit from skilled intervention to address the above impairments. Continue treatment per established plan of care.   Discharge Recommendations:  Home Health  Further Equipment Recommendations for Discharge:  rolling walker, will order prior to discharge     SUBJECTIVE:   Patient agreeable to participate with PT.     OBJECTIVE DATA SUMMARY:   Critical Behavior:  Neurologic State: Alert  Orientation Level: Oriented X4  Cognition: Appropriate for age attention/concentration, Follows commands  Safety/Judgement: Awareness of environment  Functional Mobility Training:  Bed Mobility:     Supine to Sit: Modified independent     Scooting: Independent        Transfers:  Sit to Stand: Minimum assistance; Additional time;Assist x1  Stand to Sit: Contact guard assistance; Additional time;Assist x1                             Balance:  Sitting: Intact  Standing: Impaired  Standing - Static: Good  Standing - Dynamic : Good (with rolling walker)  Ambulation/Gait Training:  Distance (ft): 150 Feet (ft)  Assistive Device: Gait belt;Walker, rolling  Ambulation - Level of Assistance: Contact guard assistance;Assist x1        Gait Abnormalities: Decreased step clearance     fatigues with ambulation, took 1 brief standing rest break           Speed/Vanita: Slow  Step Length: Left shortened;Right shortened              Pain:  Pain Scale 1: Numeric (0 - 10)  Pain Intensity 1: 0  Activity Tolerance:   Good, fatigues with ambulation, took 1 brief standing rest break    After treatment:   [x]    Patient left in no apparent distress sitting up in chair  []    Patient left in no apparent distress in bed  [x]    Call bell left within reach  [x]    Nursing notified  []    Caregiver present  []    Bed alarm activated    COMMUNICATION/COLLABORATION:   The patients plan of care was discussed with: Registered Nurse    Bertha Sow   Time Calculation: 14 mins

## 2018-03-22 LAB
ALBUMIN SERPL-MCNC: 1 G/DL (ref 3.5–5)
ALBUMIN/GLOB SERPL: 0.2 {RATIO} (ref 1.1–2.2)
ALP SERPL-CCNC: 52 U/L (ref 45–117)
ALT SERPL-CCNC: 7 U/L (ref 12–78)
ANION GAP SERPL CALC-SCNC: 7 MMOL/L (ref 5–15)
AST SERPL-CCNC: 15 U/L (ref 15–37)
BILIRUB SERPL-MCNC: 0.2 MG/DL (ref 0.2–1)
BUN SERPL-MCNC: 17 MG/DL (ref 6–20)
BUN/CREAT SERPL: 4 (ref 12–20)
CALCIUM SERPL-MCNC: 8.1 MG/DL (ref 8.5–10.1)
CHLORIDE SERPL-SCNC: 103 MMOL/L (ref 97–108)
CO2 SERPL-SCNC: 28 MMOL/L (ref 21–32)
CREAT SERPL-MCNC: 4.18 MG/DL (ref 0.55–1.02)
ERYTHROCYTE [DISTWIDTH] IN BLOOD BY AUTOMATED COUNT: 17.9 % (ref 11.5–14.5)
GLOBULIN SER CALC-MCNC: 4.6 G/DL (ref 2–4)
GLUCOSE BLD STRIP.AUTO-MCNC: 40 MG/DL (ref 65–100)
GLUCOSE BLD STRIP.AUTO-MCNC: 61 MG/DL (ref 65–100)
GLUCOSE BLD STRIP.AUTO-MCNC: 62 MG/DL (ref 65–100)
GLUCOSE BLD STRIP.AUTO-MCNC: 75 MG/DL (ref 65–100)
GLUCOSE BLD STRIP.AUTO-MCNC: 78 MG/DL (ref 65–100)
GLUCOSE BLD STRIP.AUTO-MCNC: 82 MG/DL (ref 65–100)
GLUCOSE BLD STRIP.AUTO-MCNC: 85 MG/DL (ref 65–100)
GLUCOSE BLD STRIP.AUTO-MCNC: 90 MG/DL (ref 65–100)
GLUCOSE BLD STRIP.AUTO-MCNC: 93 MG/DL (ref 65–100)
GLUCOSE SERPL-MCNC: 78 MG/DL (ref 65–100)
HCT VFR BLD AUTO: 27.8 % (ref 35–47)
HGB BLD-MCNC: 8.5 G/DL (ref 11.5–16)
MAGNESIUM SERPL-MCNC: 1.9 MG/DL (ref 1.6–2.4)
MCH RBC QN AUTO: 25.4 PG (ref 26–34)
MCHC RBC AUTO-ENTMCNC: 30.6 G/DL (ref 30–36.5)
MCV RBC AUTO: 83.2 FL (ref 80–99)
NRBC # BLD: 0 K/UL (ref 0–0.01)
NRBC BLD-RTO: 0 PER 100 WBC
PHOSPHATE SERPL-MCNC: 2.3 MG/DL (ref 2.6–4.7)
PLATELET # BLD AUTO: 175 K/UL (ref 150–400)
PMV BLD AUTO: 11.6 FL (ref 8.9–12.9)
POTASSIUM SERPL-SCNC: 4 MMOL/L (ref 3.5–5.1)
PROT SERPL-MCNC: 5.6 G/DL (ref 6.4–8.2)
RBC # BLD AUTO: 3.34 M/UL (ref 3.8–5.2)
SERVICE CMNT-IMP: ABNORMAL
SERVICE CMNT-IMP: NORMAL
SODIUM SERPL-SCNC: 138 MMOL/L (ref 136–145)
WBC # BLD AUTO: 6.3 K/UL (ref 3.6–11)

## 2018-03-22 PROCEDURE — 74011250637 HC RX REV CODE- 250/637: Performed by: INTERNAL MEDICINE

## 2018-03-22 PROCEDURE — 74011000258 HC RX REV CODE- 258: Performed by: INTERNAL MEDICINE

## 2018-03-22 PROCEDURE — 82962 GLUCOSE BLOOD TEST: CPT

## 2018-03-22 PROCEDURE — 77010033678 HC OXYGEN DAILY

## 2018-03-22 PROCEDURE — 84100 ASSAY OF PHOSPHORUS: CPT | Performed by: INTERNAL MEDICINE

## 2018-03-22 PROCEDURE — 83735 ASSAY OF MAGNESIUM: CPT | Performed by: INTERNAL MEDICINE

## 2018-03-22 PROCEDURE — 74011250636 HC RX REV CODE- 250/636: Performed by: INTERNAL MEDICINE

## 2018-03-22 PROCEDURE — 74011000250 HC RX REV CODE- 250: Performed by: HOSPITALIST

## 2018-03-22 PROCEDURE — 80053 COMPREHEN METABOLIC PANEL: CPT | Performed by: INTERNAL MEDICINE

## 2018-03-22 PROCEDURE — 94664 DEMO&/EVAL PT USE INHALER: CPT

## 2018-03-22 PROCEDURE — 65270000029 HC RM PRIVATE

## 2018-03-22 PROCEDURE — 74011250637 HC RX REV CODE- 250/637: Performed by: HOSPITALIST

## 2018-03-22 PROCEDURE — 74011250636 HC RX REV CODE- 250/636: Performed by: HOSPITALIST

## 2018-03-22 PROCEDURE — 85027 COMPLETE CBC AUTOMATED: CPT | Performed by: INTERNAL MEDICINE

## 2018-03-22 PROCEDURE — 36415 COLL VENOUS BLD VENIPUNCTURE: CPT | Performed by: INTERNAL MEDICINE

## 2018-03-22 PROCEDURE — 90935 HEMODIALYSIS ONE EVALUATION: CPT

## 2018-03-22 PROCEDURE — 74011636637 HC RX REV CODE- 636/637: Performed by: HOSPITALIST

## 2018-03-22 PROCEDURE — 94640 AIRWAY INHALATION TREATMENT: CPT

## 2018-03-22 RX ORDER — AMLODIPINE BESYLATE 5 MG/1
5 TABLET ORAL DAILY
Status: DISCONTINUED | OUTPATIENT
Start: 2018-03-23 | End: 2018-04-20 | Stop reason: HOSPADM

## 2018-03-22 RX ORDER — CARVEDILOL 12.5 MG/1
12.5 TABLET ORAL 2 TIMES DAILY WITH MEALS
Status: DISCONTINUED | OUTPATIENT
Start: 2018-03-22 | End: 2018-04-20 | Stop reason: HOSPADM

## 2018-03-22 RX ADMIN — DEXTROSE MONOHYDRATE AND SODIUM CHLORIDE 50 ML/HR: 5; .9 INJECTION, SOLUTION INTRAVENOUS at 03:50

## 2018-03-22 RX ADMIN — HYDROMORPHONE HYDROCHLORIDE 0.5 MG: 2 INJECTION INTRAMUSCULAR; INTRAVENOUS; SUBCUTANEOUS at 11:09

## 2018-03-22 RX ADMIN — HYDROMORPHONE HYDROCHLORIDE 0.5 MG: 2 INJECTION INTRAMUSCULAR; INTRAVENOUS; SUBCUTANEOUS at 17:50

## 2018-03-22 RX ADMIN — HYDROXYCHLOROQUINE SULFATE 200 MG: 200 TABLET, FILM COATED ORAL at 17:50

## 2018-03-22 RX ADMIN — ARFORMOTEROL TARTRATE 15 MCG: 15 SOLUTION RESPIRATORY (INHALATION) at 23:05

## 2018-03-22 RX ADMIN — Medication 10 ML: at 21:27

## 2018-03-22 RX ADMIN — PREDNISONE 5 MG: 5 TABLET ORAL at 08:13

## 2018-03-22 RX ADMIN — CARVEDILOL 12.5 MG: 12.5 TABLET, FILM COATED ORAL at 17:50

## 2018-03-22 RX ADMIN — HYDROMORPHONE HYDROCHLORIDE 0.5 MG: 2 INJECTION INTRAMUSCULAR; INTRAVENOUS; SUBCUTANEOUS at 00:59

## 2018-03-22 RX ADMIN — HYDROXYCHLOROQUINE SULFATE 200 MG: 200 TABLET, FILM COATED ORAL at 08:13

## 2018-03-22 RX ADMIN — APIXABAN 5 MG: 5 TABLET, FILM COATED ORAL at 08:13

## 2018-03-22 RX ADMIN — BUDESONIDE 500 MCG: 0.5 INHALANT RESPIRATORY (INHALATION) at 08:42

## 2018-03-22 RX ADMIN — HYDROMORPHONE HYDROCHLORIDE 0.5 MG: 2 INJECTION INTRAMUSCULAR; INTRAVENOUS; SUBCUTANEOUS at 08:11

## 2018-03-22 RX ADMIN — PANTOPRAZOLE SODIUM 40 MG: 40 TABLET, DELAYED RELEASE ORAL at 07:01

## 2018-03-22 RX ADMIN — Medication 10 ML: at 14:12

## 2018-03-22 RX ADMIN — HYDROMORPHONE HYDROCHLORIDE 0.5 MG: 2 INJECTION INTRAMUSCULAR; INTRAVENOUS; SUBCUTANEOUS at 14:11

## 2018-03-22 RX ADMIN — ERYTHROPOIETIN 14000 UNITS: 10000 INJECTION, SOLUTION INTRAVENOUS; SUBCUTANEOUS at 17:54

## 2018-03-22 RX ADMIN — SODIUM CHLORIDE 1 G: 900 INJECTION, SOLUTION INTRAVENOUS at 23:26

## 2018-03-22 RX ADMIN — APIXABAN 5 MG: 5 TABLET, FILM COATED ORAL at 17:50

## 2018-03-22 RX ADMIN — CARVEDILOL 6.25 MG: 6.25 TABLET, FILM COATED ORAL at 08:13

## 2018-03-22 RX ADMIN — ARFORMOTEROL TARTRATE 15 MCG: 15 SOLUTION RESPIRATORY (INHALATION) at 08:42

## 2018-03-22 RX ADMIN — AMITRIPTYLINE HYDROCHLORIDE 25 MG: 10 TABLET, FILM COATED ORAL at 21:15

## 2018-03-22 RX ADMIN — BUDESONIDE 500 MCG: 0.5 INHALANT RESPIRATORY (INHALATION) at 23:05

## 2018-03-22 RX ADMIN — HYDROMORPHONE HYDROCHLORIDE 0.5 MG: 2 INJECTION INTRAMUSCULAR; INTRAVENOUS; SUBCUTANEOUS at 03:53

## 2018-03-22 RX ADMIN — Medication 16 G: at 11:37

## 2018-03-22 RX ADMIN — GEMFIBROZIL 300 MG: 600 TABLET ORAL at 08:13

## 2018-03-22 RX ADMIN — Medication 2500 MCG: at 08:14

## 2018-03-22 RX ADMIN — HYDROMORPHONE HYDROCHLORIDE 0.5 MG: 2 INJECTION INTRAMUSCULAR; INTRAVENOUS; SUBCUTANEOUS at 21:26

## 2018-03-22 RX ADMIN — ONDANSETRON 4 MG: 2 INJECTION INTRAMUSCULAR; INTRAVENOUS at 18:29

## 2018-03-22 NOTE — PROGRESS NOTES
Hospitalist Progress Note  Rupinder Meyer MD  Answering service: [phone] from in house phone         Date of Service:  3/22/2018  NAME:  Hannah Parker  :  1985  MRN:  [id number]  Admission Summary: This is a 30-year-old female with multiple medical problems including SLE, end-stage renal disease (on hemodialysis, TTS), pulmonary embolism, on Eliquis (), hyperlipidemia, hypertension, recent Candida peritonitis, chronic bilateral pain.  She comes over here because of abdominal pain since last 4 or 5 days.               Interval history / Subjective:     3/20:   Pt yet with abd pain but better, no n.v,  Had HD today,   - low albumin met with poor po intake. Has supplements. 3/21:  A bit more swollen , cont with lower abd pain, but eating ( little ) no new fever spikes. 3/23:  Less swollen post HD,  Yet with pain lower abd, yet eats poorly, abd exam benign, drain in            Assessment & Plan:      Acute peritonitis  -recent candida peritonitis  -CT abd 3/11 Large amount of free intraperitoneal fluid appears somewhat loculated  -Follow cultures, blood culture 3/11 unremarkable. Peritoneal fluid heavy E coli  -Antibiotics per ID  -Was on Vanc/Zosyn/Fluconazole. Now Vanc/FLuconazole discontinued. Continue Cefepime  -s/p paracentesis, follow studies. With drain in place, draining serosanguinous now  - Repeat CT on 3/17 shows improvement on loculated fluid. Repeat fluid culture and cell count ordered by nephro on 3/17. C/s from 3/18 positive for ecoli , continue cefepime   Off imuran now     E coli bacteremia/Sepsis   that the patient was found to have E coli bacteremia at HD      Hypotension now with HTN  -Takes coreg 12.5 bid at home, now on 6.25,will increase as tolerated.   -now resolved  -Was on stress steroids  -Now on regular oral steroids  BP Readings from Last 1 Encounters:   18 101/87    Thrombocytopenia   -Monitor closely, improving  -Switched Zosyn to cefepime. Stable at 142  Lab Results   Component Value Date/Time    PLATELET 603 46/96/6268 04:03 AM          Bilateral pedal edema  -Dopplers negative for DVT  - mild edema 3/20/2018      SLE  -continue home meds  -D/C'd Imuran, allopurinol by nephro with acute infection and thrombocytopenia.      History of PE  -on Eliquis since 2012  See note from previous hosptalist  -Appreciate discussion with PMD 3/13. The patient was seen by hematology in 2013 but seems was lost to follow up. I think at that time the plan was to stop anticoagulation if her SLE is stable. Spoke to Dr Tami Arzola, he feels that the patient can come off Eliquis. His records do not suggest any recurrence of DVT/PE.  Spoke to Dr Krysta Gutierrez, he has not seen the patient since >1 yr. If antiphospholipid antibodies negative then the patient can come off Eliquis  -10/17 V/Q high probability for PE  -Per oncology the patient should be continued to Eliquis indefinitely      ESRD  -on HD TTS     Anemia  -of chronic disease  -Transfused 2 units PRBC with HD 3/15  Lab Results   Component Value Date/Time    Hemoglobin (POC) 7.8 (L) 01/19/2018 12:30 PM    HGB 8.5 (L) 03/22/2018 04:03 AM         Severe hepatic steatosis  -monitor     HTN  -stable  BP Readings from Last 1 Encounters:   03/22/18 101/87         Severe protein calorie malnutrition  -Consult nutrition  -nutritional supp  Lab Results   Component Value Date/Time    Protein, total 5.6 (L) 03/22/2018 04:03 AM    Albumin 1.0 (L) 03/22/2018 04:03 AM          Appreciate Nephro, ID, surgery input.     Renal diet     Code status: FULL CODE  DVT prophylaxis: Eliquis  PTA: home     Plan: Follow ID, nephrology, surgery     Care Plan discussed with: Patient/Family  Disposition: Home w/Family          Hospital Problems  Date Reviewed: 3/11/2018          Codes Class Noted POA    * (Principal)Peritonitis ([facility] [address] ICD-10-CM: K65.9  ICD-9-CM: 567.9 3/11/2018 Yes                Review of Systems:    sgable but present abd pain, ( benign abd exam ) else this is little different over past 24 hours. Feels less swollen post HD. Vital Signs:    Last 24hrs VS reviewed since prior progress note. Most recent are:  Visit Vitals    /87 (BP 1 Location: Right arm, BP Patient Position: At rest)    Pulse (!) 105    Temp 98.9 °F (37.2 °C)    Resp 18    Ht 5' 5\" (1.651 m)    Wt 66.7 kg (147 lb)    SpO2 100%    BMI 24.46 kg/m2         Intake/Output Summary (Last 24 hours) at 03/22/18 1559  Last data filed at 03/22/18 1214   Gross per 24 hour   Intake                0 ml   Output             1550 ml   Net            -1550 ml        Physical Examination:             Constitutional:  No acute distress, cooperative, pleasant    ENT:  Oral mucous moist, oropharynx benign. Neck supple,    Resp:  CTA bilaterally. No wheezing/rhonchi/rales. No accessory muscle use   CV:  Regular rhythm, normal rate, no murmurs, gallops, rubs    GI:  Soft, non distended, mild lower abd tender. normoactive bowel sounds, no hepatosplenomegaly     Musculoskeletal:  No edema, warm, 2+ pulses throughout    Neurologic:  Moves all extremities. AAOx3, CN II-XII reviewed     Psych:  Good insight, Not anxious nor agitated. Skin:  Good turgor, no rashes or ulcers       Data Review:    Review and/or order of clinical lab test      Labs:     Recent Labs      03/22/18   0403   WBC  6.3   HGB  8.5*   HCT  27.8*   PLT  175     Recent Labs      03/22/18   0403   NA  138   K  4.0   CL  103   CO2  28   BUN  17   CREA  4.18*   GLU  78   CA  8.1*   MG  1.9   PHOS  2.3*     Recent Labs      03/22/18   0403   SGOT  15   ALT  7*   AP  52   TBILI  0.2   TP  5.6*   ALB  1.0*   GLOB  4.6*     No results for input(s): INR, PTP, APTT in the last 72 hours. No lab exists for component: INREXT, INREXT   No results for input(s): FE, TIBC, PSAT, FERR in the last 72 hours.    Lab Results   Component Value Date/Time    Folate 4.1 (L) 03/15/2018 10:32 AM      No results for input(s): PH, PCO2, PO2 in the last 72 hours. No results for input(s): CPK, CKNDX, TROIQ in the last 72 hours.     No lab exists for component: CPKMB  Lab Results   Component Value Date/Time    Cholesterol, total 117 09/25/2015 02:02 PM    HDL Cholesterol 31 (L) 09/25/2015 02:02 PM    LDL, calculated 57 09/25/2015 02:02 PM    Triglyceride 146 09/25/2015 02:02 PM    CHOL/HDL Ratio 7.7 (H) 05/31/2009 10:30 AM     Lab Results   Component Value Date/Time    Glucose (POC) 82 03/22/2018 12:05 PM    Glucose (POC) 40 (LL) 03/22/2018 12:02 PM    Glucose (POC) 61 (L) 03/22/2018 11:34 AM    Glucose (POC) 62 (L) 03/22/2018 11:02 AM    Glucose (POC) 93 03/22/2018 07:32 AM     Lab Results   Component Value Date/Time    Color YELLOW/STRAW 08/12/2016 09:06 PM    Appearance CLEAR 08/12/2016 09:06 PM    Specific gravity 1.017 08/12/2016 09:06 PM    Specific gravity 1.010 07/11/2016 12:44 PM    pH (UA) 7.0 08/12/2016 09:06 PM    Protein 300 (A) 08/12/2016 09:06 PM    Glucose NEGATIVE  08/12/2016 09:06 PM    Ketone TRACE (A) 08/12/2016 09:06 PM    Bilirubin NEGATIVE  07/11/2016 12:44 PM    Urobilinogen 1.0 08/12/2016 09:06 PM    Nitrites NEGATIVE  08/12/2016 09:06 PM    Leukocyte Esterase NEGATIVE  08/12/2016 09:06 PM    Epithelial cells MODERATE (A) 08/12/2016 09:06 PM    Bacteria 1+ (A) 08/12/2016 09:06 PM    WBC 0-4 08/12/2016 09:06 PM    RBC 0-5 08/12/2016 09:06 PM         Medications Reviewed:     Current Facility-Administered Medications   Medication Dose Route Frequency    [START ON 3/23/2018] amLODIPine (NORVASC) tablet 5 mg  5 mg Oral DAILY    carvedilol (COREG) tablet 12.5 mg  12.5 mg Oral BID WITH MEALS    dextrose 5% and 0.9% NaCl infusion  50 mL/hr IntraVENous CONTINUOUS    hydrALAZINE (APRESOLINE) 20 mg/mL injection 5 mg  5 mg IntraVENous Q6H PRN    cefepime (MAXIPIME) 1 g in 0.9% sodium chloride (MBP/ADV) 50 mL ADV  1 g IntraVENous Q24H    apixaban (ELIQUIS) tablet 5 mg  5 mg Oral BID    0.9% sodium chloride infusion 250 mL  250 mL IntraVENous PRN    HYDROmorphone (DILAUDID) injection 0.5 mg  0.5 mg IntraVENous Q3H PRN    glucose chewable tablet 16 g  4 Tab Oral PRN    dextrose (D50W) injection syrg 12.5-25 g  12.5-25 g IntraVENous PRN    glucagon (GLUCAGEN) injection 1 mg  1 mg IntraMUSCular PRN    epoetin pia (EPOGEN;PROCRIT) 14,000 Units  14,000 Units SubCUTAneous DIALYSIS TUE, THU & SAT    insulin lispro (HUMALOG) injection   SubCUTAneous AC&HS    predniSONE (DELTASONE) tablet 5 mg  5 mg Oral DAILY    pantoprazole (PROTONIX) tablet 40 mg  40 mg Oral ACB    cyanocobalamin (VITAMIN B12) tablet 2,500 mcg  2,500 mcg Oral DAILY    hydroxychloroquine (PLAQUENIL) tablet 200 mg  200 mg Oral BID    albuterol (PROVENTIL VENTOLIN) nebulizer solution 2.5 mg  2.5 mg Nebulization Q4H PRN    gemfibrozil (LOPID) tablet 300 mg  300 mg Oral DAILY    oxyCODONE IR (ROXICODONE) tablet 2.5-5 mg  2.5-5 mg Oral Q6H PRN    amitriptyline (ELAVIL) tablet 25 mg  25 mg Oral QHS    sodium chloride (NS) flush 5-10 mL  5-10 mL IntraVENous Q8H    sodium chloride (NS) flush 5-10 mL  5-10 mL IntraVENous PRN    ondansetron (ZOFRAN) injection 4 mg  4 mg IntraVENous Q4H PRN    arformoterol (BROVANA) neb solution 15 mcg  15 mcg Nebulization BID RT    And    budesonide (PULMICORT) 500 mcg/2 ml nebulizer suspension  500 mcg Nebulization BID RT     ______________________________________________________________________  EXPECTED LENGTH OF STAY: 4d 21h  ACTUAL LENGTH OF STAY:          11                 Sarahi Valenzuela MD

## 2018-03-22 NOTE — PROGRESS NOTES
Physical Therapy  3/22/2018    1100: Cleared by RN for PT however pt is already in dialysis. Will follow up this afternoon as schedule permits. 1540: Pt back from dialysis and sleeping soundly. Pt not waking to knocking or loud voice. Will follow up tomorrow. Recommend pt to be up with nursing to ambulate and in chair for meals. On track for discharge with HHPT.     Thank Francisco Galvan, PT, DPT

## 2018-03-22 NOTE — DIABETES MGMT
DTC Progress Note    Recommendations/ Comments: Chart review for severe hypoglycemia - 68 mg/dl at 2233 yesterday  and  62 mg/dL at 1102 today. Noted pt's renal status and poor po intake. Noted D5 is running. Encourage po intake. Current hospital DM medication: lispro correction scael, high sensitivity    Chart reviewed on [facility]. Patient is a 29 y.o. female with no hx DM. ESRD    A1c:   Lab Results   Component Value Date/Time    Hemoglobin A1c 5.4 09/25/2015 02:02 PM    Hemoglobin A1c 5.3 03/07/2014 02:16 PM       Recent Glucose Results:   Lab Results   Component Value Date/Time    GLU 78 03/22/2018 04:03 AM    GLUCPOC 62 (L) 03/22/2018 11:02 AM    GLUCPOC 93 03/22/2018 07:32 AM    GLUCPOC 96 03/21/2018 11:13 PM        Lab Results   Component Value Date/Time    Creatinine 4.18 (H) 03/22/2018 04:03 AM     Estimated Creatinine Clearance: 17.4 mL/min (based on Cr of 4.18). Active Orders   Diet    DIET REGULAR        PO intake:   Patient Vitals for the past 72 hrs:   % Diet Eaten   03/19/18 2301 25 %   03/19/18 1200 25 %       Will continue to follow as needed.     Thank you  Mike Foy RD, CDE

## 2018-03-22 NOTE — PROGRESS NOTES
NUTRITION FOLLOW-UP ASSESSMENT    RECOMMENDATIONS:   1. Regular diet  2. RD adjust Nepro BID to once/day  3. RD adjust snack to preference change     Interventions/Plan:   Food/Nutrient Delivery:   (regular diet + snacks) Commercial supplement (Nepro BID)        Nutrition Education:     Coordination of Care:    Nutrition Counseling:        Assessment:   Reason for Assessment:   [x] Follow-up    Diet: Regular  Supplements: Nepro BID + HS snack  Nutritionally Significant Medications: [x] Reviewed & Includes: B12, EPO, Lopid, Protonix, prednisone 5 mg, humalog  Meal Intake: Patient Vitals for the past 100 hrs:   % Diet Eaten   03/19/18 2301 25 %   03/19/18 1200 25 %   03/19/18 0800 25 %       Subjective:  Appetite is fair. I try to drink one Nepro a day, can't drink two. Don't like the chicken salad snack. Had BM yesterday; No nausea; Chew/swallow ok. Used to be very heavy, 272# (~124 kg) about 9 yrs ago, but lost weight with lupus; UBW now 165-170# (~75-77 kg). Objective:  Hx CHF, HL, HTN, SLE, anemia, ESRD on HD, candida peritonitis. Today pre-HD wt 69 kg, post 68 kg ~88% UBW (77 kg). Noted 61.2 kg (1/3/18) and 64.9 kg (12/8/18), 78.5 kg (11/10/2017) reflects net loss ~10.5 kg (13%) x past ~120 days (4 months) in spite of current +1-+2 edema masking actual dry weight. Hx severe wt loss 21% (37#) in past 6 months. Nepro appropriate to increase kcal/pro, but only accepting one/day (5 un-opened at bedside) = 425 kcal & 19 gm pro meets 21% kcal and 21% pro needs respectively. Diet also supplemented with snack and RD to adjust attempting to maintain high protein. Pt estimates eating half of meals, but only ~25% per NSG I&O. Food preferences on record and on Room Service can make daily selections of preferred foods. Meeting ~50% needs including Nepro. Eating a bag of chips daily. DTC noted hypoglycemia. No hx DM. No nausea; PRN Zofran. Had BM yesterday. No c/o abdominal pain, just no appetite.      Hgb 8.5 Rx EPO and B12. No recent lipid profile. Rx Lopid with ESRD. Patient meets criteria for Severe Acute Protein Calorie Malnutrition as evidenced by:   ASPEN Malnutrition Criteria  Acute Illness, Chronic Illness, or Social/Enviornmental: Chronic illness  Energy Intake: Less than/equal to 75% of est energy req for greater than/equal to 1 month  Weight Loss: Greater than 10% x 6 mos  Body Fat: Mild  Muscle Mass: Severe  ASPEN Malnutrition Score - Chronic Illness: 19.       Estimated Nutrition Needs:   Kcals/day: 1980 Kcals/day (1980-2310kcal)  Protein: 92 g (79-92g (1.2-1.4g/kg))  Fluid: 1980 ml (30ml/kg or per renal)     Based On: Kcal/kg - specify (Comment) (30-35kcal/kg)  Weight Used: Actual wt (66kg)    Pt expected to meet estimated nutrient needs: [x]  No, not until appetite/intake improve  Comparative Standards:  ;  ;      Nutrition Diagnosis:   1. Malnutrition related to inadequate protein/energy intake 2/2 poor appetite as evidenced by consuming less than 50% energy and 50% protein needs daily; severe wt loss of 21% x 6 months; muscle/fat wasting    2. (Increased protein/energy needs) related to increased energy expenditure 2/2 ESRD as evidenced by on HD, severe wt loss since start of HD      Goals:     Consumption of at least 50% of meals and 1Nepro per day in next 5-7 days     Monitoring & Evaluation:    - Total energy intake, Liquid meal replacement   - Weight/weight change, Electrolyte and renal profile, GI profile     Previous Nutrition Goals Met:  Not met; unable to drink ONS BID and appetite remains less than 50% meals.      Previous Recommendations:      N/A    Education & Discharge Needs:   [x] Identified and addressed: Reviewed kcal/pro needs    [x] Participated in care plan, discharge planning, and/or interdisciplinary rounds        Cultural, Moravian and ethnic food preferences identified:   None    Skin Integrity: [x]Intact    Edema: [x] +1-2  Last BM: 2/21/18  Food Allergies: [x]NKFA    Anthropometrics:    Weight Loss Metrics 3/21/2018 3/5/2018 1/29/2018 1/19/2018 1/17/2018 1/3/2018 12/21/2017   Today's Wt 147 lb 150 lb 153 lb 146 lb 9.7 oz 146 lb 9.7 oz 135 lb 130 lb 9.6 oz   BMI 24.46 kg/m2 24.96 kg/m2 25.46 kg/m2 24.4 kg/m2 24.4 kg/m2 22.47 kg/m2 -      Last 3 Recorded Weights in this Encounter    03/19/18 0525 03/20/18 0722 03/21/18 0812   Weight: 65.4 kg (144 lb 1.6 oz) 65.3 kg (144 lb) 66.7 kg (147 lb)      Weight Source: Bed  Height: 5' 5\" (165.1 cm),    Body mass index is 24.46 kg/(m^2). IBW : 56.7 kg (125 lb), % IBW (Calculated): 117.04 %  Usual Body Weight: 83 kg (183 lb),      Labs:  Lab Results   Component Value Date/Time    Sodium 138 03/22/2018 04:03 AM    Potassium 4.0 03/22/2018 04:03 AM    Chloride 103 03/22/2018 04:03 AM    CO2 28 03/22/2018 04:03 AM    Glucose 78 03/22/2018 04:03 AM    BUN 17 03/22/2018 04:03 AM    Creatinine 4.18 (H) 03/22/2018 04:03 AM    Calcium 8.1 (L) 03/22/2018 04:03 AM    Magnesium 1.9 03/22/2018 04:03 AM    Phosphorus 2.3 (L) 03/22/2018 04:03 AM    Albumin 1.0 (L) 03/22/2018 04:03 AM     Lab Results   Component Value Date/Time    Hemoglobin A1c 5.4 09/25/2015 02:02 PM     Lab Results   Component Value Date/Time    Glucose 78 03/22/2018 04:03 AM    Glucose (POC) 82 03/22/2018 12:05 PM      Lab Results   Component Value Date/Time    ALT (SGPT) 7 (L) 03/22/2018 04:03 AM    AST (SGOT) 15 03/22/2018 04:03 AM    Alk.  phosphatase 52 03/22/2018 04:03 AM    Bilirubin, direct 0.1 11/27/2017 03:02 AM    Bilirubin, total 0.2 03/22/2018 04:03 AM        Yasmin Sutton RD

## 2018-03-22 NOTE — ROUTINE PROCESS
Bedside shift change report given to [facility] (oncoming nurse) by Mert Ybarra (offgoing nurse). Report included the following information SBAR, Kardex, ED Summary, Procedure Summary, Intake/Output and MAR.

## 2018-03-22 NOTE — DIALYSIS
[facility] Team [facility]  [phone]    Vitals   Pre   Post   Assessment   Pre   Post     Temp  Temp: 98.9 °F (37.2 °C) (03/22/18 0914)  98.4 Oral  LOC  A&Ox4 A&OX4    HR   Pulse (Heart Rate): (!) 110 (03/22/18 0914) 105 Lungs   Clear   Clear    B/P   BP: (!) 131/101 (03/22/18 0914) 149/111--Primary nurse aware  Cardiac   HRR   HRR    Resp   Resp Rate: 18 (03/22/18 0914) 18  Skin   Warm, Dry   Warm, Dry    Pain level  5/10--Patient received pain medication prior to treatment start  7/10--Stomach    Primary nurse aware  Edema    Trace edema BLE      Trace edema BLE    Orders:    Duration:   Start:   9303 End:    7781  Total:   3 Hours    Dialyzer:   Dialyzer/Set Up Inspection: Nick Dys (03/22/18 0914)   K Bath:   Dialysate K (mEq/L): 3 (03/22/18 0914)   Ca Bath:   Dialysate CA (mEq/L): 2.5 (03/22/18 0914)   Na/Bicarb:   Dialysate NA (mEq/L): 140 (03/22/18 0914)   Target Fluid Removal:   Goal/Amount of Fluid to Remove (mL): 1500 mL (03/22/18 0914)   Access     Type & Location:   Right upper arm AVG- No s/sx of infection. No drainage noted. (+) Bruit/thrill. Access prepped per protocol and cannulated x2 with 15g needles, aspirated and flushed well with NS 0.9% . Sites secured with tape, lines connected and treatment initiated.     Labs     Obtained/Reviewed   Critical Results Called   Date when labs were drawn- 3/22/18   Hgb-    HGB   Date Value Ref Range Status   03/22/2018 8.5 (L) 11.5 - 16.0 g/dL Final     K-    Potassium   Date Value Ref Range Status   03/22/2018 4.0 3.5 - 5.1 mmol/L Final     Ca-   Calcium   Date Value Ref Range Status   03/22/2018 8.1 (L) 8.5 - 10.1 MG/DL Final     Bun-   BUN   Date Value Ref Range Status   03/22/2018 17 6 - 20 MG/DL Final     Creat-   Creatinine   Date Value Ref Range Status   03/22/2018 4.18 (H) 0.55 - 1.02 MG/DL Final        Medications/ Blood Products Given     Name   Dose   Route and Time     None                 Blood Volume Processed (BVP):    66.4 L  Net Fluid   Removed:  1500 mL    Comments   Time Out Done: Yes   Primary Nurse Rpt Pre: Yolanda Holt RN   Primary Nurse Rpt Post: Yolanda Holt RN   Pt Education: Procedural   Care Plan: Continue with dialysis as ordered   Tx Summary: Treatment completed per order; patient tolerated well. Hypertensive but stable. Dr. Wendy Blue noted BP during rounds. No new orders. Patient had low blood sugar during last hour of treatment; asymptomatic. Primary nurse gave juice and glucose tablets. All possible blood rinsed back with NS 0.9% at end of treatment . VSS. Lines disconnected, needles removed; hemostasis achieved without excessive bleeding, (+) bruit/thrill. Sites secured with gauze and tape. Report given to primary nurse. Transported back to room by nurse and PCT. Admiting Diagnosis: Severe sepsis secondary to bacteremia versus peritonitis  Pt's previous clinic- [facility]   Consent signed - Informed Consent Verified: Yes (03/22/18 0914)  [facility] Consent - Yes   Hepatitis Status- Antibodies 10 on 1/11/18--Negative 12/26/17   Machine #- Machine Number: [id number] (03/22/18 0914)  Telemetry status- NA  Pre-dialysis wt.- 69.0 kg  Post-dialysis wt.  - 68.0 kg

## 2018-03-22 NOTE — PROGRESS NOTES
Patient name: Sarthak Glover  MRN: [id number]    Nephrology Progress note:    Assessment:  ESRD: on HD TTS  E. Coli Bacteremia (Hard copy placed in chart): source intra-abd source/ E. Coli peritonitis. IV zosyn. Repeat Bld Cx 3/11-> Neg. 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/Lupus: Hgb below goal  SHPT  Protein malnutrition  Hypokalemia    Plan/Recommendations:  HD  now   AB  At risk of bleed while on eliquis   Discussed with her       Subjective:   abd pain;seen and examined on HD         Exam:  Visit Vitals    BP (!) 144/110    Pulse (!) 111    Temp 98.9 °F (37.2 °C) (Oral)    Resp 18    Ht 5' 5\" (1.651 m)    Wt 66.7 kg (147 lb)    SpO2 97%    BMI 24.46 kg/m2     Wt Readings from Last 3 Encounters:   03/21/18 66.7 kg (147 lb)   03/05/18 68 kg (150 lb)   01/29/18 69.4 kg (153 lb)       Intake/Output Summary (Last 24 hours) at 03/22/18 1159  Last data filed at 03/21/18 2301   Gross per 24 hour   Intake                0 ml   Output              125 ml   Net             -125 ml     No edema  NAD    Labs/Data:    Lab Results   Component Value Date/Time    WBC 6.3 03/22/2018 04:03 AM    Hemoglobin (POC) 7.8 (L) 01/19/2018 12:30 PM    HGB 8.5 (L) 03/22/2018 04:03 AM    Hematocrit (POC) 23 (L) 01/19/2018 12:30 PM    HCT 27.8 (L) 03/22/2018 04:03 AM    PLATELET 676 25/22/8960 04:03 AM    MCV 83.2 03/22/2018 04:03 AM       Lab Results   Component Value Date/Time    Sodium 138 03/22/2018 04:03 AM    Potassium 4.0 03/22/2018 04:03 AM Chloride 103 03/22/2018 04:03 AM    CO2 28 03/22/2018 04:03 AM    Anion gap 7 03/22/2018 04:03 AM    Glucose 78 03/22/2018 04:03 AM    BUN 17 03/22/2018 04:03 AM    Creatinine 4.18 (H) 03/22/2018 04:03 AM    BUN/Creatinine ratio 4 (L) 03/22/2018 04:03 AM    GFR est AA 15 (L) 03/22/2018 04:03 AM    GFR est non-AA 12 (L) 03/22/2018 04:03 AM    Calcium 8.1 (L) 03/22/2018 04:03 AM

## 2018-03-22 NOTE — PROGRESS NOTES
Paged On call hospitalist.   Dr. Cullen Burciaga returned page. Informed him that patient appeared swollen on right side of her body and face also noted that patients lungs sounded like she had some new onset crackles. Order received for portable CXR. CXR completed. Paged Dr. Cullen Burciaga and informed him of results.    Will wait for HD tomorrow but continue to monitor for now, If patient becomes symptomatic will inform MD.

## 2018-03-23 ENCOUNTER — APPOINTMENT (OUTPATIENT)
Dept: CT IMAGING | Age: 33
DRG: 853 | End: 2018-03-23
Attending: INTERNAL MEDICINE
Payer: MEDICARE

## 2018-03-23 LAB
GLUCOSE BLD STRIP.AUTO-MCNC: 116 MG/DL (ref 65–100)
GLUCOSE BLD STRIP.AUTO-MCNC: 122 MG/DL (ref 65–100)
GLUCOSE BLD STRIP.AUTO-MCNC: 130 MG/DL (ref 65–100)
GLUCOSE BLD STRIP.AUTO-MCNC: 62 MG/DL (ref 65–100)
GLUCOSE BLD STRIP.AUTO-MCNC: 70 MG/DL (ref 65–100)
GLUCOSE BLD STRIP.AUTO-MCNC: 89 MG/DL (ref 65–100)
SERVICE CMNT-IMP: ABNORMAL
SERVICE CMNT-IMP: NORMAL
SERVICE CMNT-IMP: NORMAL

## 2018-03-23 PROCEDURE — 74011000258 HC RX REV CODE- 258: Performed by: INTERNAL MEDICINE

## 2018-03-23 PROCEDURE — 82962 GLUCOSE BLOOD TEST: CPT

## 2018-03-23 PROCEDURE — 74011636320 HC RX REV CODE- 636/320: Performed by: INTERNAL MEDICINE

## 2018-03-23 PROCEDURE — 74011636637 HC RX REV CODE- 636/637: Performed by: HOSPITALIST

## 2018-03-23 PROCEDURE — 74011250636 HC RX REV CODE- 250/636: Performed by: HOSPITALIST

## 2018-03-23 PROCEDURE — 65270000029 HC RM PRIVATE

## 2018-03-23 PROCEDURE — 74177 CT ABD & PELVIS W/CONTRAST: CPT

## 2018-03-23 PROCEDURE — 94640 AIRWAY INHALATION TREATMENT: CPT

## 2018-03-23 PROCEDURE — 74011250637 HC RX REV CODE- 250/637: Performed by: HOSPITALIST

## 2018-03-23 PROCEDURE — 77010033678 HC OXYGEN DAILY

## 2018-03-23 PROCEDURE — 94664 DEMO&/EVAL PT USE INHALER: CPT

## 2018-03-23 PROCEDURE — 74011250636 HC RX REV CODE- 250/636: Performed by: INTERNAL MEDICINE

## 2018-03-23 PROCEDURE — 74011000250 HC RX REV CODE- 250: Performed by: HOSPITALIST

## 2018-03-23 PROCEDURE — 74011250637 HC RX REV CODE- 250/637: Performed by: INTERNAL MEDICINE

## 2018-03-23 PROCEDURE — 97116 GAIT TRAINING THERAPY: CPT

## 2018-03-23 RX ORDER — SODIUM CHLORIDE 0.9 % (FLUSH) 0.9 %
10 SYRINGE (ML) INJECTION
Status: COMPLETED | OUTPATIENT
Start: 2018-03-23 | End: 2018-03-23

## 2018-03-23 RX ADMIN — Medication 10 ML: at 21:17

## 2018-03-23 RX ADMIN — SODIUM CHLORIDE 100 ML: 900 INJECTION, SOLUTION INTRAVENOUS at 15:55

## 2018-03-23 RX ADMIN — HYDROXYCHLOROQUINE SULFATE 200 MG: 200 TABLET, FILM COATED ORAL at 17:47

## 2018-03-23 RX ADMIN — ONDANSETRON 4 MG: 2 INJECTION INTRAMUSCULAR; INTRAVENOUS at 14:27

## 2018-03-23 RX ADMIN — APIXABAN 5 MG: 5 TABLET, FILM COATED ORAL at 09:24

## 2018-03-23 RX ADMIN — DEXTROSE MONOHYDRATE AND SODIUM CHLORIDE 50 ML/HR: 5; .9 INJECTION, SOLUTION INTRAVENOUS at 04:32

## 2018-03-23 RX ADMIN — IOPAMIDOL 100 ML: 755 INJECTION, SOLUTION INTRAVENOUS at 15:55

## 2018-03-23 RX ADMIN — CARVEDILOL 12.5 MG: 12.5 TABLET, FILM COATED ORAL at 09:24

## 2018-03-23 RX ADMIN — CARVEDILOL 12.5 MG: 12.5 TABLET, FILM COATED ORAL at 16:59

## 2018-03-23 RX ADMIN — HYDROXYCHLOROQUINE SULFATE 200 MG: 200 TABLET, FILM COATED ORAL at 09:24

## 2018-03-23 RX ADMIN — HYDROMORPHONE HYDROCHLORIDE 0.5 MG: 2 INJECTION INTRAMUSCULAR; INTRAVENOUS; SUBCUTANEOUS at 13:19

## 2018-03-23 RX ADMIN — HYDROMORPHONE HYDROCHLORIDE 0.5 MG: 2 INJECTION INTRAMUSCULAR; INTRAVENOUS; SUBCUTANEOUS at 20:11

## 2018-03-23 RX ADMIN — AMITRIPTYLINE HYDROCHLORIDE 25 MG: 10 TABLET, FILM COATED ORAL at 21:15

## 2018-03-23 RX ADMIN — HYDROMORPHONE HYDROCHLORIDE 0.5 MG: 2 INJECTION INTRAMUSCULAR; INTRAVENOUS; SUBCUTANEOUS at 16:59

## 2018-03-23 RX ADMIN — GEMFIBROZIL 300 MG: 600 TABLET ORAL at 09:23

## 2018-03-23 RX ADMIN — PANTOPRAZOLE SODIUM 40 MG: 40 TABLET, DELAYED RELEASE ORAL at 07:24

## 2018-03-23 RX ADMIN — DEXTROSE MONOHYDRATE 12.5 G: 500 INJECTION PARENTERAL at 11:50

## 2018-03-23 RX ADMIN — ARFORMOTEROL TARTRATE 15 MCG: 15 SOLUTION RESPIRATORY (INHALATION) at 22:57

## 2018-03-23 RX ADMIN — SODIUM CHLORIDE 1 G: 900 INJECTION, SOLUTION INTRAVENOUS at 20:10

## 2018-03-23 RX ADMIN — PREDNISONE 5 MG: 5 TABLET ORAL at 09:24

## 2018-03-23 RX ADMIN — Medication 2500 MCG: at 09:23

## 2018-03-23 RX ADMIN — Medication 10 ML: at 14:29

## 2018-03-23 RX ADMIN — APIXABAN 5 MG: 5 TABLET, FILM COATED ORAL at 17:47

## 2018-03-23 RX ADMIN — DEXTROSE MONOHYDRATE 12.5 G: 500 INJECTION PARENTERAL at 16:29

## 2018-03-23 RX ADMIN — BUDESONIDE 500 MCG: 0.5 INHALANT RESPIRATORY (INHALATION) at 22:57

## 2018-03-23 RX ADMIN — HYDROMORPHONE HYDROCHLORIDE 0.5 MG: 2 INJECTION INTRAMUSCULAR; INTRAVENOUS; SUBCUTANEOUS at 00:58

## 2018-03-23 RX ADMIN — HYDROMORPHONE HYDROCHLORIDE 0.5 MG: 2 INJECTION INTRAMUSCULAR; INTRAVENOUS; SUBCUTANEOUS at 10:05

## 2018-03-23 RX ADMIN — Medication 10 ML: at 15:55

## 2018-03-23 RX ADMIN — OXYCODONE HYDROCHLORIDE 5 MG: 5 TABLET ORAL at 09:24

## 2018-03-23 RX ADMIN — HYDROMORPHONE HYDROCHLORIDE 0.5 MG: 2 INJECTION INTRAMUSCULAR; INTRAVENOUS; SUBCUTANEOUS at 04:32

## 2018-03-23 RX ADMIN — AMLODIPINE BESYLATE 5 MG: 5 TABLET ORAL at 09:24

## 2018-03-23 NOTE — PROGRESS NOTES
Patient name: Marjorie Arango  MRN: [id number]    Nephrology Progress note:    Assessment:  ESRD: on HD TTS  E. Coli Bacteremia (Hard copy placed in chart): source intra-abd source/ E. Coli peritonitis. IV zosyn. Repeat Bld Cx 3/11-> Neg. 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/Lupus: Hgb below goal  SHPT  Protein malnutrition  Hypokalemia    Plan/Recommendations:  HD  tomorrow   AB  At risk of bleed while on eliquis   Will follow again on monday  Discussed with her       Subjective:  Still abd pain;for CTscan         Exam:  Visit Vitals    /83 (BP 1 Location: Left arm, BP Patient Position: At rest)    Pulse (!) 104    Temp 98.6 °F (37 °C)    Resp 16    Ht 5' 5\" (1.651 m)    Wt 66.8 kg (147 lb 4.8 oz)    SpO2 97%    BMI 24.51 kg/m2     Wt Readings from Last 3 Encounters:   03/22/18 66.8 kg (147 lb 4.8 oz)   03/05/18 68 kg (150 lb)   01/29/18 69.4 kg (153 lb)       Intake/Output Summary (Last 24 hours) at 03/23/18 1244  Last data filed at 03/23/18 0541   Gross per 24 hour   Intake                0 ml   Output              150 ml   Net             -150 ml     No edema  NAD    Labs/Data:    Lab Results   Component Value Date/Time    WBC 6.3 03/22/2018 04:03 AM    Hemoglobin (POC) 7.8 (L) 01/19/2018 12:30 PM    HGB 8.5 (L) 03/22/2018 04:03 AM    Hematocrit (POC) 23 (L) 01/19/2018 12:30 PM    HCT 27.8 (L) 03/22/2018 04:03 AM    PLATELET 037 16/60/5475 04:03 AM    MCV 83.2 03/22/2018 04:03 AM       Lab Results   Component Value Date/Time    Sodium 138 03/22/2018 04:03 AM    Potassium 4.0 03/22/2018 04:03 AM    Chloride 103 03/22/2018 04:03 AM    CO2 28 03/22/2018 04:03 AM    Anion gap 7 03/22/2018 04:03 AM    Glucose 78 03/22/2018 04:03 AM    BUN 17 03/22/2018 04:03 AM    Creatinine 4.18 (H) 03/22/2018 04:03 AM    BUN/Creatinine ratio 4 (L) 03/22/2018 04:03 AM    GFR est AA 15 (L) 03/22/2018 04:03 AM    GFR est non-AA 12 (L) 03/22/2018 04:03 AM    Calcium 8.1 (L) 03/22/2018 04:03 AM

## 2018-03-23 NOTE — PROGRESS NOTES
Problem: Mobility Impaired (Adult and Pediatric)  Goal: *Acute Goals and Plan of Care (Insert Text)  Physical Therapy Goals  Initiated 3/16/2018  1. Patient will move from supine to sit and sit to supine  in bed with supervision/set-up within 7 day(s). 2.  Patient will transfer from bed to chair and chair to bed with supervision/set-up using the least restrictive device within 7 day(s). 3.  Patient will perform sit to stand with minimal assistance/contact guard assist within 7 day(s). 4.  Patient will ambulate with minimal assistance/contact guard assist for 50 feet with the least restrictive device within 7 day(s). physical Therapy TREATMENT  Patient: Delmis Enciso (7 y.o. female)  Date: 3/23/2018  Diagnosis: Peritonitis ([address] Peritonitis ([address]       Precautions: Fall (abd drain)  Chart, physical therapy assessment, plan of care and goals were reviewed. ASSESSMENT:  Patient agreeable to OOB gait training. Generally indep with bed mobility, still requiring MIN A for sit to stand and two attempts to succeed, difficulty with extension in B knees. Once standing, patient's balance good with RW support. Ambulated x 150' total distance with two instances of stopping for 10-15 sec to rest.  REturned to bedside chair where quickly reviewed LE therex, patient needed no assist and able to perform independently. Continues to benefit from skilled PT to work on improving activity and gait tolerance. Recommend daily ambulation with nursing as well x 1-2 per day to further promote endurance. Progression toward goals:  [x]    Improving appropriately and progressing toward goals  []    Improving slowly and progressing toward goals  []    Not making progress toward goals and plan of care will be adjusted     PLAN:  Patient continues to benefit from skilled intervention to address the above impairments. Continue treatment per established plan of care.   Discharge Recommendations:  Home Health  Further Equipment Recommendations for Discharge:  rolling walker     SUBJECTIVE:   Patient stated I'm just feeling tired today.     OBJECTIVE DATA SUMMARY:   Critical Behavior:  Neurologic State: Alert  Orientation Level: Oriented X4  Cognition: Appropriate decision making  Safety/Judgement: Awareness of environment  Functional Mobility Training:  Bed Mobility:  Rolling: Independent  Supine to Sit: Independent              Transfers:  Sit to Stand: Minimum assistance; Additional time  Stand to Sit: Supervision                             Balance:  Sitting: Intact; Without support  Standing: Intact; With support  Standing - Static: Good  Standing - Dynamic : Good (with RW)  Ambulation/Gait Training:  Distance (ft): 150 Feet (ft)  Assistive Device: Gait belt;Walker, rolling  Ambulation - Level of Assistance: Stand-by assistance        Gait Abnormalities: Decreased step clearance              Speed/Vanita: Slow  Step Length: Left shortened;Right shortened          Pain:  Pain Scale 1: Numeric (0 - 10)  Pain Intensity 1: 8  Pain Location 1: Abdomen  Pain Orientation 1: Right  Pain Description 1: Aching  Pain Intervention(s) 1: Medication (see MAR)  Activity Tolerance:   Fair, 150' ambulation with RW with 2 rest breaks    Please refer to the flowsheet for vital signs taken during this treatment.   After treatment:   [x]    Patient left in no apparent distress sitting up in chair  []    Patient left in no apparent distress in bed  [x]    Call bell left within reach  [x]    Nursing notified  []    Caregiver present  []    Bed alarm activated    COMMUNICATION/COLLABORATION:   The patients plan of care was discussed with: Registered Nurse    Kate Lugo, PT   Time Calculation: 19 mins

## 2018-03-23 NOTE — PROGRESS NOTES
Hospitalist Progress Note  Ac Jacobsen MD  Answering service: [phone] from in house phone         Date of Service:  3/23/2018  NAME:  Bautista Van  :  1985  MRN:  [id number]  Admission Summary: This is a 71-year-old female with multiple medical problems including SLE, end-stage renal disease (on hemodialysis, TTS), pulmonary embolism, on Eliquis (), hyperlipidemia, hypertension, recent Candida peritonitis, chronic bilateral pain.  She comes over here because of abdominal pain since last 4 or 5 days.               Interval history / Subjective:     3/20:   Pt yet with abd pain but better, no n.v,  Had HD today,   - low albumin met with poor po intake. Has supplements. 3/21:  A bit more swollen , cont with lower abd pain, but eating ( little ) no new fever spikes. 3/22:  Less swollen post HD,  Yet with pain lower abd, yet eats poorly, abd exam benign, drain in     3/23:  Pt with cont pain, seems real , discussed case with RN;  Also discussed case with pt, the decision is to do another CT of abd/pelvis  to see if we can come upon some understanding of why she is experiencing such continued pain that is requiring routine dilaudid as hers is ,  It is noted that the oral oxy does not seem to work as well as the iv dilaudid , may consider asking palliative medicine to participate in this pain management            Assessment & Plan:      Acute peritonitis  -recent candida peritonitis  -CT abd 3/11 Large amount of free intraperitoneal fluid appears somewhat loculated  -Follow cultures, blood culture 3/11 unremarkable. Peritoneal fluid heavy E coli  -Antibiotics per ID  -Was on Vanc/Zosyn/Fluconazole. Now Vanc/FLuconazole discontinued. Continue Cefepime  -s/p paracentesis, follow studies. With drain in place, draining serosanguinous now  - Repeat CT on 3/17 shows improvement on loculated fluid.   Repeat fluid culture and cell count ordered by nephro on 3/17. C/s from 3/18 positive for ecoli , continue cefepime   Off imuran now     E coli bacteremia/Sepsis   that the patient was found to have E coli bacteremia at HD      Hypotension now with HTN  -Takes coreg 12.5 bid at home, now on 6.25,will increase as tolerated. -now resolved  -Was on stress steroids  -Now on regular oral steroids  BP Readings from Last 1 Encounters:   03/23/18 123/83         Thrombocytopenia   -Monitor closely, improving  -Switched Zosyn to cefepime. Stable at 142  Lab Results   Component Value Date/Time    PLATELET 532 71/91/8616 04:03 AM          Bilateral pedal edema  -Dopplers negative for DVT  - mild edema 3/20/2018      SLE  -continue home meds  -D/C'd Imuran, allopurinol by nephro with acute infection and thrombocytopenia.      History of PE  -on Eliquis since 2012  See note from previous hosptalist  -Appreciate discussion with PMD 3/13. The patient was seen by hematology in 2013 but seems was lost to follow up. I think at that time the plan was to stop anticoagulation if her SLE is stable. Spoke to Dr Tomi Reich, he feels that the patient can come off Eliquis. His records do not suggest any recurrence of DVT/PE.  Spoke to Dr Dolly Mcburney, he has not seen the patient since >1 yr. If antiphospholipid antibodies negative then the patient can come off Eliquis  -10/17 V/Q high probability for PE  -Per oncology the patient should be continued to Eliquis indefinitely      ESRD  -on HD TTS     Anemia  -of chronic disease  -Transfused 2 units PRBC with HD 3/15  Lab Results   Component Value Date/Time    Hemoglobin (POC) 7.8 (L) 01/19/2018 12:30 PM    HGB 8.5 (L) 03/22/2018 04:03 AM         Severe hepatic steatosis  -monitor     HTN  -stable  BP Readings from Last 1 Encounters:   03/23/18 123/83         Severe protein calorie malnutrition  -Consult nutrition  -nutritional supp  Lab Results   Component Value Date/Time    Protein, total 5.6 (L) 03/22/2018 04:03 AM Albumin 1.0 (L) 03/22/2018 04:03 AM          Appreciate Nephro, ID, surgery input.     Renal diet     Code status: FULL CODE  DVT prophylaxis: Eliquis  PTA: home     Plan: Follow ID, nephrology, surgery     Care Plan discussed with: Patient/Family  Disposition: Home w/Family          Hospital Problems  Date Reviewed: 3/11/2018          Codes Class Noted POA    * (Principal)Peritonitis ([facility] [address] ICD-10-CM: K65.9  ICD-9-CM: 567.9  3/11/2018 Yes                Review of Systems:    sgable but present abd pain, ( benign abd exam ) else this is little different over past 24 hours. Feels less swollen post HD. Vital Signs:    Last 24hrs VS reviewed since prior progress note. Most recent are:  Visit Vitals    /83 (BP 1 Location: Left arm, BP Patient Position: At rest)    Pulse (!) 104    Temp 98.6 °F (37 °C)    Resp 16    Ht 5' 5\" (1.651 m)    Wt 66.8 kg (147 lb 4.8 oz)    SpO2 97%    BMI 24.51 kg/m2         Intake/Output Summary (Last 24 hours) at 03/23/18 1043  Last data filed at 03/23/18 0541   Gross per 24 hour   Intake                0 ml   Output             1650 ml   Net            -1650 ml        Physical Examination:             Constitutional:  No acute distress, cooperative, pleasant    ENT:  Oral mucous moist, oropharynx benign. Neck supple,    Resp:  CTA bilaterally. No wheezing/rhonchi/rales. No accessory muscle use   CV:  Regular rhythm, normal rate, no murmurs, gallops, rubs    GI:  Soft, non distended, mild lower abd tender. normoactive bowel sounds, no hepatosplenomegaly     Musculoskeletal:  No edema, warm, 2+ pulses throughout    Neurologic:  Moves all extremities. AAOx3, CN II-XII reviewed     Psych:  Good insight, Not anxious nor agitated.   Skin:  Good turgor, no rashes or ulcers       Data Review:    Review and/or order of clinical lab test      Labs:     Recent Labs      03/22/18   0403   WBC  6.3   HGB  8.5*   HCT  27.8*   PLT  175     Recent Labs      03/22/18   0403   NA  138 K  4.0   CL  103   CO2  28   BUN  17   CREA  4.18*   GLU  78   CA  8.1*   MG  1.9   PHOS  2.3*     Recent Labs      03/22/18   0403   SGOT  15   ALT  7*   AP  52   TBILI  0.2   TP  5.6*   ALB  1.0*   GLOB  4.6*     No results for input(s): INR, PTP, APTT in the last 72 hours. No lab exists for component: INREXT, INREXT   No results for input(s): FE, TIBC, PSAT, FERR in the last 72 hours. Lab Results   Component Value Date/Time    Folate 4.1 (L) 03/15/2018 10:32 AM      No results for input(s): PH, PCO2, PO2 in the last 72 hours. No results for input(s): CPK, CKNDX, TROIQ in the last 72 hours.     No lab exists for component: CPKMB  Lab Results   Component Value Date/Time    Cholesterol, total 117 09/25/2015 02:02 PM    HDL Cholesterol 31 (L) 09/25/2015 02:02 PM    LDL, calculated 57 09/25/2015 02:02 PM    Triglyceride 146 09/25/2015 02:02 PM    CHOL/HDL Ratio 7.7 (H) 05/31/2009 10:30 AM     Lab Results   Component Value Date/Time    Glucose (POC) 122 (H) 03/23/2018 06:18 AM    Glucose (POC) 85 03/22/2018 10:48 PM    Glucose (POC) 78 03/22/2018 10:25 PM    Glucose (POC) 75 03/22/2018 09:58 PM    Glucose (POC) 90 03/22/2018 04:40 PM     Lab Results   Component Value Date/Time    Color YELLOW/STRAW 08/12/2016 09:06 PM    Appearance CLEAR 08/12/2016 09:06 PM    Specific gravity 1.017 08/12/2016 09:06 PM    Specific gravity 1.010 07/11/2016 12:44 PM    pH (UA) 7.0 08/12/2016 09:06 PM    Protein 300 (A) 08/12/2016 09:06 PM    Glucose NEGATIVE  08/12/2016 09:06 PM    Ketone TRACE (A) 08/12/2016 09:06 PM    Bilirubin NEGATIVE  07/11/2016 12:44 PM    Urobilinogen 1.0 08/12/2016 09:06 PM    Nitrites NEGATIVE  08/12/2016 09:06 PM    Leukocyte Esterase NEGATIVE  08/12/2016 09:06 PM    Epithelial cells MODERATE (A) 08/12/2016 09:06 PM    Bacteria 1+ (A) 08/12/2016 09:06 PM    WBC 0-4 08/12/2016 09:06 PM    RBC 0-5 08/12/2016 09:06 PM         Medications Reviewed:     Current Facility-Administered Medications   Medication Dose Route Frequency    amLODIPine (NORVASC) tablet 5 mg  5 mg Oral DAILY    carvedilol (COREG) tablet 12.5 mg  12.5 mg Oral BID WITH MEALS    dextrose 5% and 0.9% NaCl infusion  50 mL/hr IntraVENous CONTINUOUS    hydrALAZINE (APRESOLINE) 20 mg/mL injection 5 mg  5 mg IntraVENous Q6H PRN    cefepime (MAXIPIME) 1 g in 0.9% sodium chloride (MBP/ADV) 50 mL ADV  1 g IntraVENous Q24H    apixaban (ELIQUIS) tablet 5 mg  5 mg Oral BID    0.9% sodium chloride infusion 250 mL  250 mL IntraVENous PRN    HYDROmorphone (DILAUDID) injection 0.5 mg  0.5 mg IntraVENous Q3H PRN    glucose chewable tablet 16 g  4 Tab Oral PRN    dextrose (D50W) injection syrg 12.5-25 g  12.5-25 g IntraVENous PRN    glucagon (GLUCAGEN) injection 1 mg  1 mg IntraMUSCular PRN    epoetin pia (EPOGEN;PROCRIT) 14,000 Units  14,000 Units SubCUTAneous DIALYSIS TUE, THU & SAT    insulin lispro (HUMALOG) injection   SubCUTAneous AC&HS    predniSONE (DELTASONE) tablet 5 mg  5 mg Oral DAILY    pantoprazole (PROTONIX) tablet 40 mg  40 mg Oral ACB    cyanocobalamin (VITAMIN B12) tablet 2,500 mcg  2,500 mcg Oral DAILY    hydroxychloroquine (PLAQUENIL) tablet 200 mg  200 mg Oral BID    albuterol (PROVENTIL VENTOLIN) nebulizer solution 2.5 mg  2.5 mg Nebulization Q4H PRN    gemfibrozil (LOPID) tablet 300 mg  300 mg Oral DAILY    oxyCODONE IR (ROXICODONE) tablet 2.5-5 mg  2.5-5 mg Oral Q6H PRN    amitriptyline (ELAVIL) tablet 25 mg  25 mg Oral QHS    sodium chloride (NS) flush 5-10 mL  5-10 mL IntraVENous Q8H    sodium chloride (NS) flush 5-10 mL  5-10 mL IntraVENous PRN    ondansetron (ZOFRAN) injection 4 mg  4 mg IntraVENous Q4H PRN    arformoterol (BROVANA) neb solution 15 mcg  15 mcg Nebulization BID RT    And    budesonide (PULMICORT) 500 mcg/2 ml nebulizer suspension  500 mcg Nebulization BID RT     ______________________________________________________________________  EXPECTED LENGTH OF STAY: 4d 21h  ACTUAL LENGTH OF STAY: Manav Snaford MD

## 2018-03-23 NOTE — PROGRESS NOTES
Problem: Falls - Risk of  Goal: *Absence of Falls  Document Alex Fall Risk and appropriate interventions in the flowsheet.    Outcome: Progressing Towards Goal  Fall Risk Interventions:  Mobility Interventions: Communicate number of staff needed for ambulation/transfer, Patient to call before getting OOB, PT Consult for mobility concerns, PT Consult for assist device competence, Strengthening exercises (ROM-active/passive), Utilize walker, cane, or other assitive device, Utilize gait belt for transfers/ambulation    Mentation Interventions: Adequate sleep, hydration, pain control    Medication Interventions: Evaluate medications/consider consulting pharmacy, Patient to call before getting OOB, Teach patient to arise slowly, Utilize gait belt for transfers/ambulation    Elimination Interventions: Call light in reach, Elevated toilet seat, Patient to call for help with toileting needs, Toilet paper/wipes in reach, Toileting schedule/hourly rounds

## 2018-03-23 NOTE — PROGRESS NOTES
Cm continuing to follow for transitions of care; discharge plans remain the same. Patient will return to her home and resume outpatient dialysis. Cm will be available if any other needs arise.   [employer] , [place]

## 2018-03-23 NOTE — PROGRESS NOTES
Carmen Hopper  is a 29 y. o.female  with a history of SLE, lupus nephritis leading to ESRD //HTN/ treated for candida peritonitis in Dec 2017  Is admitted with abdominal pain and fever since last Thursday  Pt was in [facility] in Dec 2017 for candida peritonitis and PD was stopped and she was started on HD thru a catheter. She took 4 weeks of fluconazole and was doing fine She had a graft placed on the rt arm on 1/19/19 - She developed fever and abdominal pain on 3/6 and blood culture sent from the catheter at dialysis center- The catheter was removed on 3/7 and the graft was accessed last week-   She was given IV gent  at the dialysis center.  As the abdominal pain was getting worse she came to the ED on 3/11 and was admitted  CT abdomen showed Large amount of free intraperitoneal fluid that is  loculated.         Multiple hospitalizations in the past few months  OCt 14-17 /2017 for left lower lobe pneumonia/effusion- was found to have PE on the rt side  10/27-10/30 possible pneumonia- same infiltrate left lower lobe- sent on augmentin     11/26-/11/28 for b/l leg swelling and abdominal swelling     12/12/17-12/22/17- fungal peritonitis- treated with fluconazole for 4 weeks     She had  peritoneal dialysis since 2015- had cath placed in 2015 until it was removed in Dec 2017      subjective  Still Has lower abdominal pain  Objective:   VITALS:    Patient Vitals for the past 12 hrs:   Temp Pulse Resp BP SpO2   03/23/18 0817 98.6 °F (37 °C) (!) 104 16 123/83 97 %   03/23/18 0536 98.6 °F (37 °C) 99 16 106/81 95 %   PHYSICAL EXAM:   General:                   no distress  Lungs:                       B/l air entry     Heart:                                  s1s2  Abdomen:                  Drain -purulent reddish fluid  Tender lower abdomen  Extremities:               Rt arm graft   Skin:                                    Dry skin- striae over arms/abdomen  Lymph:                      Cervical, supraclavicular normal.  Neurologic:                Grossly non-focal     Pertinent Labs  Wbc 2.5>6.4   Hb 8.4  PLT 91  Lactate 1.5  Albumin 1.5  Peritoneal fluid- 3524 WBC ( 85% N)  E.coli     IMAGING RESULTS:       Impression/Recommendation    33 yr female with SLE/ lupus nephritis/HTN -h/o peritoneal dialysis until Dec 2017 /Candida peritonitis in Dec 2017/ treated with 4 weeks of fluconazole     Admitted with abdominal pain and fever of  5 days     E.coli  peritonitis with e.coli bacteremia  Loculated peritoneal fluid- has a drain- repeat imaging from 3/17 still shows a collection but significantly reduced- She has pockets of fluid and will have to make sure that it is all completely drained - going for another CT scan- may need surgery   Continue cefepime for a week after complete resolution of the peritoneal collection       Recently treated candida peritonitis in Dec 2017- that time she was on PD     SLE-   Discussed with patient     ID will follow peripherally over the weekend  Call [facility] thru [facility]  if needed

## 2018-03-23 NOTE — DIABETES MGMT
DTC Progress Note    Recommendations/ Comments: Chart review for severe hypoglycemia - BG 40 at noon yesterday (3-) and 75 at bedtime. On 3- BG 68 mg/dl at 2233    Noted pt's renal status and poor po intake. Noted D5 is running. Encourage po intake. Current hospital DM medication: lispro correction scael, high sensitivity    Chart reviewed on [facility]. Patient is a 29 y.o. female with no hx DM. ESRD    A1c:   Lab Results   Component Value Date/Time    Hemoglobin A1c 5.4 09/25/2015 02:02 PM    Hemoglobin A1c 5.3 03/07/2014 02:16 PM       Recent Glucose Results:   Lab Results   Component Value Date/Time    GLUCPOC 122 (H) 03/23/2018 06:18 AM    GLUCPOC 85 03/22/2018 10:48 PM    GLUCPOC 78 03/22/2018 10:25 PM        Lab Results   Component Value Date/Time    Creatinine 4.18 (H) 03/22/2018 04:03 AM     Estimated Creatinine Clearance: 17.4 mL/min (based on Cr of 4.18). Active Orders   Diet    DIET REGULAR        PO intake:   No data found. Will continue to follow as needed.     Thank you  Felipe Hammer RN, CDE

## 2018-03-24 LAB
GLUCOSE BLD STRIP.AUTO-MCNC: 71 MG/DL (ref 65–100)
GLUCOSE BLD STRIP.AUTO-MCNC: 71 MG/DL (ref 65–100)
GLUCOSE BLD STRIP.AUTO-MCNC: 80 MG/DL (ref 65–100)
GLUCOSE BLD STRIP.AUTO-MCNC: 83 MG/DL (ref 65–100)
GLUCOSE BLD STRIP.AUTO-MCNC: 88 MG/DL (ref 65–100)
GLUCOSE BLD STRIP.AUTO-MCNC: 96 MG/DL (ref 65–100)
SERVICE CMNT-IMP: NORMAL

## 2018-03-24 PROCEDURE — 65270000029 HC RM PRIVATE

## 2018-03-24 PROCEDURE — 74011636637 HC RX REV CODE- 636/637: Performed by: HOSPITALIST

## 2018-03-24 PROCEDURE — 74011250636 HC RX REV CODE- 250/636: Performed by: HOSPITALIST

## 2018-03-24 PROCEDURE — 74011000250 HC RX REV CODE- 250: Performed by: HOSPITALIST

## 2018-03-24 PROCEDURE — 74011250636 HC RX REV CODE- 250/636: Performed by: INTERNAL MEDICINE

## 2018-03-24 PROCEDURE — 90935 HEMODIALYSIS ONE EVALUATION: CPT

## 2018-03-24 PROCEDURE — 74011250637 HC RX REV CODE- 250/637: Performed by: INTERNAL MEDICINE

## 2018-03-24 PROCEDURE — 94640 AIRWAY INHALATION TREATMENT: CPT

## 2018-03-24 PROCEDURE — 82962 GLUCOSE BLOOD TEST: CPT

## 2018-03-24 PROCEDURE — 74011000258 HC RX REV CODE- 258: Performed by: INTERNAL MEDICINE

## 2018-03-24 PROCEDURE — 74011250637 HC RX REV CODE- 250/637: Performed by: HOSPITALIST

## 2018-03-24 RX ADMIN — PREDNISONE 5 MG: 5 TABLET ORAL at 08:58

## 2018-03-24 RX ADMIN — ARFORMOTEROL TARTRATE 15 MCG: 15 SOLUTION RESPIRATORY (INHALATION) at 07:01

## 2018-03-24 RX ADMIN — Medication 2500 MCG: at 08:58

## 2018-03-24 RX ADMIN — Medication 10 ML: at 06:54

## 2018-03-24 RX ADMIN — Medication 10 ML: at 13:28

## 2018-03-24 RX ADMIN — HYDROMORPHONE HYDROCHLORIDE 0.5 MG: 2 INJECTION INTRAMUSCULAR; INTRAVENOUS; SUBCUTANEOUS at 00:11

## 2018-03-24 RX ADMIN — HYDROMORPHONE HYDROCHLORIDE 0.5 MG: 2 INJECTION INTRAMUSCULAR; INTRAVENOUS; SUBCUTANEOUS at 22:51

## 2018-03-24 RX ADMIN — ARFORMOTEROL TARTRATE 15 MCG: 15 SOLUTION RESPIRATORY (INHALATION) at 19:58

## 2018-03-24 RX ADMIN — APIXABAN 5 MG: 5 TABLET, FILM COATED ORAL at 19:39

## 2018-03-24 RX ADMIN — HYDROMORPHONE HYDROCHLORIDE 0.5 MG: 2 INJECTION INTRAMUSCULAR; INTRAVENOUS; SUBCUTANEOUS at 19:39

## 2018-03-24 RX ADMIN — ERYTHROPOIETIN 14000 UNITS: 10000 INJECTION, SOLUTION INTRAVENOUS; SUBCUTANEOUS at 18:41

## 2018-03-24 RX ADMIN — APIXABAN 5 MG: 5 TABLET, FILM COATED ORAL at 08:58

## 2018-03-24 RX ADMIN — BUDESONIDE 500 MCG: 0.5 INHALANT RESPIRATORY (INHALATION) at 07:02

## 2018-03-24 RX ADMIN — GEMFIBROZIL 300 MG: 600 TABLET ORAL at 08:58

## 2018-03-24 RX ADMIN — HYDROMORPHONE HYDROCHLORIDE 0.5 MG: 2 INJECTION INTRAMUSCULAR; INTRAVENOUS; SUBCUTANEOUS at 03:00

## 2018-03-24 RX ADMIN — AMLODIPINE BESYLATE 5 MG: 5 TABLET ORAL at 08:58

## 2018-03-24 RX ADMIN — AMITRIPTYLINE HYDROCHLORIDE 25 MG: 10 TABLET, FILM COATED ORAL at 21:31

## 2018-03-24 RX ADMIN — SODIUM CHLORIDE 1 G: 900 INJECTION, SOLUTION INTRAVENOUS at 19:39

## 2018-03-24 RX ADMIN — PANTOPRAZOLE SODIUM 40 MG: 40 TABLET, DELAYED RELEASE ORAL at 06:54

## 2018-03-24 RX ADMIN — HYDROMORPHONE HYDROCHLORIDE 0.5 MG: 2 INJECTION INTRAMUSCULAR; INTRAVENOUS; SUBCUTANEOUS at 16:27

## 2018-03-24 RX ADMIN — CARVEDILOL 12.5 MG: 12.5 TABLET, FILM COATED ORAL at 08:58

## 2018-03-24 RX ADMIN — HYDROXYCHLOROQUINE SULFATE 200 MG: 200 TABLET, FILM COATED ORAL at 19:39

## 2018-03-24 RX ADMIN — HYDROMORPHONE HYDROCHLORIDE 0.5 MG: 2 INJECTION INTRAMUSCULAR; INTRAVENOUS; SUBCUTANEOUS at 13:27

## 2018-03-24 RX ADMIN — CARVEDILOL 12.5 MG: 12.5 TABLET, FILM COATED ORAL at 19:39

## 2018-03-24 RX ADMIN — HYDROMORPHONE HYDROCHLORIDE 0.5 MG: 2 INJECTION INTRAMUSCULAR; INTRAVENOUS; SUBCUTANEOUS at 10:08

## 2018-03-24 RX ADMIN — Medication 10 ML: at 21:33

## 2018-03-24 RX ADMIN — BUDESONIDE 500 MCG: 0.5 INHALANT RESPIRATORY (INHALATION) at 19:58

## 2018-03-24 RX ADMIN — HYDROMORPHONE HYDROCHLORIDE 0.5 MG: 2 INJECTION INTRAMUSCULAR; INTRAVENOUS; SUBCUTANEOUS at 06:54

## 2018-03-24 RX ADMIN — HYDROXYCHLOROQUINE SULFATE 200 MG: 200 TABLET, FILM COATED ORAL at 08:58

## 2018-03-24 NOTE — PROGRESS NOTES
Hospitalist Progress Note  Romeo Ventura MD  Answering service: [phone] from in house phone         Date of Service:  3/24/2018  NAME:  Patricia Jason  :  1985  MRN:  [id number]  Admission Summary: This is a 52-year-old female with multiple medical problems including SLE, end-stage renal disease (on hemodialysis, TTS), pulmonary embolism, on Eliquis (), hyperlipidemia, hypertension, recent Candida peritonitis, chronic bilateral pain.  She comes over here because of abdominal pain since last 4 or 5 days.               Interval history / Subjective:     3/20:   Pt yet with abd pain but better, no n.v,  Had HD today,   - low albumin met with poor po intake. Has supplements. 3/21:  A bit more swollen , cont with lower abd pain, but eating ( little ) no new fever spikes. 3/22:  Less swollen post HD,  Yet with pain lower abd, yet eats poorly, abd exam benign, drain in     3/23:  Pt with cont pain, seems real , discussed case with RN;  Also discussed case with pt, the decision is to do another CT of abd/pelvis  to see if we can come upon some understanding of why she is experiencing such continued pain that is requiring routine dilaudid as hers is ,  It is noted that the oral oxy does not seem to work as well as the iv dilaudid , may consider asking palliative medicine to participate in this pain management     3/24:  CT from yesterday noting cont collection of fluid low and abd not changed since 3/17 cT. Will ask surg to see if they have options to help. Pt remains in pain.            Assessment & Plan:      Acute peritonitis  -recent candida peritonitis  -CT abd 3/11 Large amount of free intraperitoneal fluid appears somewhat loculated  -Follow cultures, blood culture 3/11 unremarkable. Peritoneal fluid heavy E coli  -Antibiotics per ID  -Was on Vanc/Zosyn/Fluconazole. Now Vanc/FLuconazole discontinued.  Continue Cefepime  -s/p paracentesis, follow studies. With drain in place, draining serosanguinous now  - Repeat CT on 3/17 shows improvement on loculated fluid. Repeat fluid culture and cell count ordered by nephro on 3/17. C/s from 3/18 positive for ecoli , continue cefepime   Off imuran now     E coli bacteremia/Sepsis   that the patient was found to have E coli bacteremia at HD      Hypotension now with HTN  -Takes coreg 12.5 bid at home, now on 6.25,will increase as tolerated. -now resolved  -Was on stress steroids  -Now on regular oral steroids  BP Readings from Last 1 Encounters:   03/24/18 115/75         Thrombocytopenia   -Monitor closely, improving  -Switched Zosyn to cefepime. Stable at 142  Lab Results   Component Value Date/Time    PLATELET 986 66/50/2175 04:03 AM          Bilateral pedal edema  -Dopplers negative for DVT  - mild edema 3/20/2018      SLE  -continue home meds  -D/C'd Imuran, allopurinol by nephro with acute infection and thrombocytopenia.      History of PE  -on Eliquis since 2012  See note from previous hosptalist  -Appreciate discussion with PMD 3/13. The patient was seen by hematology in 2013 but seems was lost to follow up. I think at that time the plan was to stop anticoagulation if her SLE is stable. Spoke to Dr Suri Bennett, he feels that the patient can come off Eliquis. His records do not suggest any recurrence of DVT/PE.  Spoke to Dr Juvenal Ventura, he has not seen the patient since >1 yr. If antiphospholipid antibodies negative then the patient can come off Eliquis  -10/17 V/Q high probability for PE  -Per oncology the patient should be continued to Eliquis indefinitely      ESRD  -on HD TTS     Anemia  -of chronic disease  -Transfused 2 units PRBC with HD 3/15  Lab Results   Component Value Date/Time    Hemoglobin (POC) 7.8 (L) 01/19/2018 12:30 PM    HGB 8.5 (L) 03/22/2018 04:03 AM         Severe hepatic steatosis  -monitor     HTN  -stable  BP Readings from Last 1 Encounters:   03/24/18 115/75         Severe protein calorie malnutrition  -Consult nutrition  -nutritional supp  Lab Results   Component Value Date/Time    Protein, total 5.6 (L) 03/22/2018 04:03 AM    Albumin 1.0 (L) 03/22/2018 04:03 AM          Appreciate Nephro, ID, surgery input.     Renal diet     Code status: FULL CODE  DVT prophylaxis: Eliquis  PTA: home     Plan: Follow ID, nephrology, surgery     Care Plan discussed with: Patient/Family  Disposition: Home w/Family          Hospital Problems  Date Reviewed: 3/11/2018          Codes Class Noted POA    * (Principal)Peritonitis ([facility] [address] ICD-10-CM: Q93.9  ICD-9-CM: 567.9  3/11/2018 Yes                Review of Systems: Yet with lower abd pian else minimal nausea better today, no diarrhea. Vital Signs:    Last 24hrs VS reviewed since prior progress note. Most recent are:  Visit Vitals    /75 (BP 1 Location: Left arm, BP Patient Position: At rest)    Pulse 76    Temp 98.1 °F (36.7 °C)    Resp 16    Ht 5' 5\" (1.651 m)    Wt 66.8 kg (147 lb 4.3 oz)    SpO2 99%    BMI 24.51 kg/m2         Intake/Output Summary (Last 24 hours) at 03/24/18 8189  Last data filed at 03/23/18 2058   Gross per 24 hour   Intake                0 ml   Output               80 ml   Net              -80 ml        Physical Examination:             Constitutional:  No acute distress, cooperative, pleasant    ENT:  Oral mucous moist, oropharynx benign. Neck supple,    Resp:  CTA bilaterally. No wheezing/rhonchi/rales. No accessory muscle use   CV:  Regular rhythm, normal rate, no murmurs, gallops, rubs    GI:  Soft, non distended, mild lower abd tender. normoactive bowel sounds, no hepatosplenomegaly     Musculoskeletal:  No edema, warm, 2+ pulses throughout    Neurologic:  Moves all extremities. AAOx3, CN II-XII reviewed     Psych:  Good insight, Not anxious nor agitated.   Skin:  Good turgor, no rashes or ulcers       Data Review:    Review and/or order of clinical lab test      Labs:     Recent Labs 03/22/18   0403   WBC  6.3   HGB  8.5*   HCT  27.8*   PLT  175     Recent Labs      03/22/18   0403   NA  138   K  4.0   CL  103   CO2  28   BUN  17   CREA  4.18*   GLU  78   CA  8.1*   MG  1.9   PHOS  2.3*     Recent Labs      03/22/18   0403   SGOT  15   ALT  7*   AP  52   TBILI  0.2   TP  5.6*   ALB  1.0*   GLOB  4.6*     No results for input(s): INR, PTP, APTT in the last 72 hours. No lab exists for component: INREXT, INREXT   No results for input(s): FE, TIBC, PSAT, FERR in the last 72 hours. Lab Results   Component Value Date/Time    Folate 4.1 (L) 03/15/2018 10:32 AM      No results for input(s): PH, PCO2, PO2 in the last 72 hours. No results for input(s): CPK, CKNDX, TROIQ in the last 72 hours.     No lab exists for component: CPKMB  Lab Results   Component Value Date/Time    Cholesterol, total 117 09/25/2015 02:02 PM    HDL Cholesterol 31 (L) 09/25/2015 02:02 PM    LDL, calculated 57 09/25/2015 02:02 PM    Triglyceride 146 09/25/2015 02:02 PM    CHOL/HDL Ratio 7.7 (H) 05/31/2009 10:30 AM     Lab Results   Component Value Date/Time    Glucose (POC) 80 03/24/2018 07:56 AM    Glucose (POC) 71 03/24/2018 07:28 AM    Glucose (POC) 71 03/24/2018 07:02 AM    Glucose (POC) 89 03/23/2018 10:38 PM    Glucose (POC) 116 (H) 03/23/2018 04:41 PM     Lab Results   Component Value Date/Time    Color YELLOW/STRAW 08/12/2016 09:06 PM    Appearance CLEAR 08/12/2016 09:06 PM    Specific gravity 1.017 08/12/2016 09:06 PM    Specific gravity 1.010 07/11/2016 12:44 PM    pH (UA) 7.0 08/12/2016 09:06 PM    Protein 300 (A) 08/12/2016 09:06 PM    Glucose NEGATIVE  08/12/2016 09:06 PM    Ketone TRACE (A) 08/12/2016 09:06 PM    Bilirubin NEGATIVE  07/11/2016 12:44 PM    Urobilinogen 1.0 08/12/2016 09:06 PM    Nitrites NEGATIVE  08/12/2016 09:06 PM    Leukocyte Esterase NEGATIVE  08/12/2016 09:06 PM    Epithelial cells MODERATE (A) 08/12/2016 09:06 PM    Bacteria 1+ (A) 08/12/2016 09:06 PM    WBC 0-4 08/12/2016 09:06 PM RBC 0-5 08/12/2016 09:06 PM         Medications Reviewed:     Current Facility-Administered Medications   Medication Dose Route Frequency    amLODIPine (NORVASC) tablet 5 mg  5 mg Oral DAILY    carvedilol (COREG) tablet 12.5 mg  12.5 mg Oral BID WITH MEALS    dextrose 5% and 0.9% NaCl infusion  50 mL/hr IntraVENous CONTINUOUS    hydrALAZINE (APRESOLINE) 20 mg/mL injection 5 mg  5 mg IntraVENous Q6H PRN    cefepime (MAXIPIME) 1 g in 0.9% sodium chloride (MBP/ADV) 50 mL ADV  1 g IntraVENous Q24H    apixaban (ELIQUIS) tablet 5 mg  5 mg Oral BID    0.9% sodium chloride infusion 250 mL  250 mL IntraVENous PRN    HYDROmorphone (DILAUDID) injection 0.5 mg  0.5 mg IntraVENous Q3H PRN    glucose chewable tablet 16 g  4 Tab Oral PRN    dextrose (D50W) injection syrg 12.5-25 g  12.5-25 g IntraVENous PRN    glucagon (GLUCAGEN) injection 1 mg  1 mg IntraMUSCular PRN    epoetin pia (EPOGEN;PROCRIT) 14,000 Units  14,000 Units SubCUTAneous DIALYSIS TUE, THU & SAT    insulin lispro (HUMALOG) injection   SubCUTAneous AC&HS    predniSONE (DELTASONE) tablet 5 mg  5 mg Oral DAILY    pantoprazole (PROTONIX) tablet 40 mg  40 mg Oral ACB    cyanocobalamin (VITAMIN B12) tablet 2,500 mcg  2,500 mcg Oral DAILY    hydroxychloroquine (PLAQUENIL) tablet 200 mg  200 mg Oral BID    albuterol (PROVENTIL VENTOLIN) nebulizer solution 2.5 mg  2.5 mg Nebulization Q4H PRN    gemfibrozil (LOPID) tablet 300 mg  300 mg Oral DAILY    oxyCODONE IR (ROXICODONE) tablet 2.5-5 mg  2.5-5 mg Oral Q6H PRN    amitriptyline (ELAVIL) tablet 25 mg  25 mg Oral QHS    sodium chloride (NS) flush 5-10 mL  5-10 mL IntraVENous Q8H    sodium chloride (NS) flush 5-10 mL  5-10 mL IntraVENous PRN    ondansetron (ZOFRAN) injection 4 mg  4 mg IntraVENous Q4H PRN    arformoterol (BROVANA) neb solution 15 mcg  15 mcg Nebulization BID RT    And    budesonide (PULMICORT) 500 mcg/2 ml nebulizer suspension  500 mcg Nebulization BID RT ______________________________________________________________________  EXPECTED LENGTH OF STAY: 4d 21h  ACTUAL LENGTH OF STAY:          Laurie Carias MD

## 2018-03-24 NOTE — DIALYSIS
[facility]                         [phone]  Vitals Pre Post Assessment Pre Post   /91 130/90 LOC A&Ox4 A&Ox4   HR 92bpm 92bpm Pain denies denies   Temp 98.0 97.9 Lungs Diminishedx5, even & unlabored Diminished but clear X5, even and unlabored   Resp 16/min 16/min Cardiac Reg rate & rhythm Reg rate & rthythm   Weight 60.5Kg 58.5Kg Skin Warm & dry Warm & dry      Edema none noted none noted     Orders   Duration: Start: 1545 End: 1845 Total: 3hrs   Dialyzer: Gambro Revaclear   K Bath: 3   Ca Bath: 2.5   Na / Bicarb: 140/35 bicarb   Target Fluid Removal: 2L as tolerated     Access   Type: AV fistula   Location: PETER   Comments: Bruit and thrill positive and present pre-tx prepped per protocol and easily cannulated with 2-15G AV fistula needles with good flows upon aspiration. Fistula site exhibits no redness, no edema, no discharge, no palor, no odor, nor heat. Post-tx all possible blood returned via full rinseback. Both needles removed without excessive bleeding (A-15min, V-15min). Bruit and thrill positive and present post-tx. Labs   Obtained/Reviewed  Critical Results Called CBC/ renal panel       Meds Given   Name Dose Route   Epogen  14,000units Sq per floor staff               Total Liters Process: 73.9   Net Fluid Removed: 2L      Comments   Pre-tx consents obtained/ICEBOAT time-out performed. tx progressed well and without incident. Writer left pt in no new acute distress and denying complaints with stable vss WNL, bed in low position with side rails up x 3, wheels locked x4, and call bell within reach, daughter at bedside. Reported off to [employer]. Afsaneh Lopez RN.

## 2018-03-24 NOTE — PROGRESS NOTES
Bedside shift change report given to Suleman Kingsley Dr (oncoming nurse) by Anson Mcintyre (offgoing nurse). Report included the following information SBAR, Kardex, Intake/Output, MAR and Recent Results.

## 2018-03-25 ENCOUNTER — APPOINTMENT (OUTPATIENT)
Dept: ULTRASOUND IMAGING | Age: 33
DRG: 853 | End: 2018-03-25
Attending: SURGERY
Payer: MEDICARE

## 2018-03-25 LAB
APTT PPP: 31.3 SEC (ref 22.1–32)
GLUCOSE BLD STRIP.AUTO-MCNC: 117 MG/DL (ref 65–100)
GLUCOSE BLD STRIP.AUTO-MCNC: 85 MG/DL (ref 65–100)
GLUCOSE BLD STRIP.AUTO-MCNC: 90 MG/DL (ref 65–100)
GLUCOSE BLD STRIP.AUTO-MCNC: 93 MG/DL (ref 65–100)
INR PPP: 1.3 (ref 0.9–1.1)
PROTHROMBIN TIME: 13.5 SEC (ref 9–11.1)
SERVICE CMNT-IMP: ABNORMAL
SERVICE CMNT-IMP: NORMAL
THERAPEUTIC RANGE,PTTT: NORMAL SECS (ref 58–77)

## 2018-03-25 PROCEDURE — 65270000029 HC RM PRIVATE

## 2018-03-25 PROCEDURE — 74011250636 HC RX REV CODE- 250/636: Performed by: INTERNAL MEDICINE

## 2018-03-25 PROCEDURE — 74011250636 HC RX REV CODE- 250/636: Performed by: HOSPITALIST

## 2018-03-25 PROCEDURE — 36415 COLL VENOUS BLD VENIPUNCTURE: CPT | Performed by: SURGERY

## 2018-03-25 PROCEDURE — 74011000250 HC RX REV CODE- 250: Performed by: HOSPITALIST

## 2018-03-25 PROCEDURE — 82962 GLUCOSE BLOOD TEST: CPT

## 2018-03-25 PROCEDURE — 74011000258 HC RX REV CODE- 258: Performed by: INTERNAL MEDICINE

## 2018-03-25 PROCEDURE — 85730 THROMBOPLASTIN TIME PARTIAL: CPT | Performed by: SURGERY

## 2018-03-25 PROCEDURE — 85610 PROTHROMBIN TIME: CPT | Performed by: SURGERY

## 2018-03-25 PROCEDURE — 74011636637 HC RX REV CODE- 636/637: Performed by: HOSPITALIST

## 2018-03-25 PROCEDURE — 94640 AIRWAY INHALATION TREATMENT: CPT

## 2018-03-25 PROCEDURE — 74011250637 HC RX REV CODE- 250/637: Performed by: HOSPITALIST

## 2018-03-25 PROCEDURE — 76705 ECHO EXAM OF ABDOMEN: CPT

## 2018-03-25 PROCEDURE — 74011250637 HC RX REV CODE- 250/637: Performed by: INTERNAL MEDICINE

## 2018-03-25 RX ADMIN — HYDROMORPHONE HYDROCHLORIDE 0.5 MG: 2 INJECTION INTRAMUSCULAR; INTRAVENOUS; SUBCUTANEOUS at 05:37

## 2018-03-25 RX ADMIN — Medication 10 ML: at 14:26

## 2018-03-25 RX ADMIN — APIXABAN 5 MG: 5 TABLET, FILM COATED ORAL at 08:47

## 2018-03-25 RX ADMIN — HYDROMORPHONE HYDROCHLORIDE 0.5 MG: 2 INJECTION INTRAMUSCULAR; INTRAVENOUS; SUBCUTANEOUS at 08:48

## 2018-03-25 RX ADMIN — HYDROXYCHLOROQUINE SULFATE 200 MG: 200 TABLET, FILM COATED ORAL at 17:32

## 2018-03-25 RX ADMIN — CARVEDILOL 12.5 MG: 12.5 TABLET, FILM COATED ORAL at 08:47

## 2018-03-25 RX ADMIN — OXYCODONE HYDROCHLORIDE 5 MG: 5 TABLET ORAL at 00:03

## 2018-03-25 RX ADMIN — APIXABAN 5 MG: 5 TABLET, FILM COATED ORAL at 17:32

## 2018-03-25 RX ADMIN — Medication 10 ML: at 05:39

## 2018-03-25 RX ADMIN — PANTOPRAZOLE SODIUM 40 MG: 40 TABLET, DELAYED RELEASE ORAL at 06:40

## 2018-03-25 RX ADMIN — HYDROMORPHONE HYDROCHLORIDE 0.5 MG: 2 INJECTION INTRAMUSCULAR; INTRAVENOUS; SUBCUTANEOUS at 17:39

## 2018-03-25 RX ADMIN — BUDESONIDE 500 MCG: 0.5 INHALANT RESPIRATORY (INHALATION) at 21:09

## 2018-03-25 RX ADMIN — ONDANSETRON 4 MG: 2 INJECTION INTRAMUSCULAR; INTRAVENOUS at 16:25

## 2018-03-25 RX ADMIN — Medication 2500 MCG: at 08:46

## 2018-03-25 RX ADMIN — HYDROMORPHONE HYDROCHLORIDE 0.5 MG: 2 INJECTION INTRAMUSCULAR; INTRAVENOUS; SUBCUTANEOUS at 11:23

## 2018-03-25 RX ADMIN — AMITRIPTYLINE HYDROCHLORIDE 25 MG: 10 TABLET, FILM COATED ORAL at 22:54

## 2018-03-25 RX ADMIN — HYDROMORPHONE HYDROCHLORIDE 0.5 MG: 2 INJECTION INTRAMUSCULAR; INTRAVENOUS; SUBCUTANEOUS at 21:05

## 2018-03-25 RX ADMIN — CARVEDILOL 12.5 MG: 12.5 TABLET, FILM COATED ORAL at 17:32

## 2018-03-25 RX ADMIN — HYDROMORPHONE HYDROCHLORIDE 0.5 MG: 2 INJECTION INTRAMUSCULAR; INTRAVENOUS; SUBCUTANEOUS at 02:30

## 2018-03-25 RX ADMIN — ARFORMOTEROL TARTRATE 15 MCG: 15 SOLUTION RESPIRATORY (INHALATION) at 21:09

## 2018-03-25 RX ADMIN — SODIUM CHLORIDE 1 G: 900 INJECTION, SOLUTION INTRAVENOUS at 19:31

## 2018-03-25 RX ADMIN — HYDROXYCHLOROQUINE SULFATE 200 MG: 200 TABLET, FILM COATED ORAL at 08:47

## 2018-03-25 RX ADMIN — BUDESONIDE 500 MCG: 0.5 INHALANT RESPIRATORY (INHALATION) at 07:17

## 2018-03-25 RX ADMIN — PREDNISONE 5 MG: 5 TABLET ORAL at 08:47

## 2018-03-25 RX ADMIN — GEMFIBROZIL 300 MG: 600 TABLET ORAL at 08:47

## 2018-03-25 RX ADMIN — ARFORMOTEROL TARTRATE 15 MCG: 15 SOLUTION RESPIRATORY (INHALATION) at 07:17

## 2018-03-25 RX ADMIN — HYDROMORPHONE HYDROCHLORIDE 0.5 MG: 2 INJECTION INTRAMUSCULAR; INTRAVENOUS; SUBCUTANEOUS at 14:25

## 2018-03-25 RX ADMIN — AMLODIPINE BESYLATE 5 MG: 5 TABLET ORAL at 08:47

## 2018-03-25 NOTE — PROGRESS NOTES
Progress Note    Patient: Benjamin Dear MRN: [id number]  SSN: [id number]    YOB: 1985  Age: 29 y.o. Sex: female      Admit Date: 3/11/2018    Peritoneal fluid collection    Subjective:     Pt still with significant pain in lower abdomen. CT scan showed fluid collection in left abdomen is I dont think is adequately drain with right pigtail catheter. No fever or leukocytosis.      Objective:     Visit Vitals    BP (!) 146/99 (BP 1 Location: Left arm, BP Patient Position: At rest)    Pulse (!) 106    Temp 99 °F (37.2 °C)    Resp 16    Ht 5' 5\" (1.651 m)    Wt 147 lb 4.3 oz (66.8 kg)    SpO2 98%    BMI 24.51 kg/m2       Temp (24hrs), Av.2 °F (36.8 °C), Min:97.8 °F (36.6 °C), Max:99 °F (37.2 °C)        Physical Exam:    Gen:  NAD  Pulm:  Unlabored  Abd:  S/ND/moderate TTP in LLQ    Recent Results (from the past 24 hour(s))   GLUCOSE, POC    Collection Time: 18  5:14 PM   Result Value Ref Range    Glucose (POC) 83 65 - 100 mg/dL    Performed by [facility])    GLUCOSE, POC    Collection Time: 18  9:22 PM   Result Value Ref Range    Glucose (POC) 88 65 - 100 mg/dL    Performed by [facility]    PROTHROMBIN TIME + INR    Collection Time: 18  4:34 AM   Result Value Ref Range    INR 1.3 (H) 0.9 - 1.1      Prothrombin time 13.5 (H) 9.0 - 11.1 sec   PTT    Collection Time: 18  4:34 AM   Result Value Ref Range    aPTT 31.3 22.1 - 32.0 sec    aPTT, therapeutic range     58.0 - 77.0 SECS   GLUCOSE, POC    Collection Time: 18  6:42 AM   Result Value Ref Range    Glucose (POC) 90 65 - 100 mg/dL    Performed by Meri Herrera    GLUCOSE, POC    Collection Time: 18 11:44 AM   Result Value Ref Range    Glucose (POC) 85 65 - 100 mg/dL    Performed by Michael Foster          Assessment:     Hospital Problems  Date Reviewed: 3/11/2018          Codes Class Noted POA    * (Principal)[facility] ICD-10-CM: K65.9  ICD-9-CM: 567.9  3/11/2018 Yes Plan/Recommendations/Medical Decision Making:     - Abdominal fluid collection:  No acute indication for operation.   There is dependent area in left abdomen fluid collection that is not adequately drain with right pigtail catheter  - Recommend IR US guided paracentesis and drainage of left peritoneal fluid collection  - Continue antibiotic per ID  - Pain control     Signed By: Shirley Orellana MD     March 25, 2018

## 2018-03-25 NOTE — PROGRESS NOTES
Problem: Falls - Risk of  Goal: *Absence of Falls  Document Alex Fall Risk and appropriate interventions in the flowsheet.    Outcome: Progressing Towards Goal  Fall Risk Interventions:  Mobility Interventions: Communicate number of staff needed for ambulation/transfer, OT consult for ADLs, Patient to call before getting OOB, PT Consult for mobility concerns, PT Consult for assist device competence, Strengthening exercises (ROM-active/passive), Utilize walker, cane, or other assitive device, Utilize gait belt for transfers/ambulation    Mentation Interventions: Adequate sleep, hydration, pain control    Medication Interventions: Evaluate medications/consider consulting pharmacy, Patient to call before getting OOB, Teach patient to arise slowly, Utilize gait belt for transfers/ambulation    Elimination Interventions: Call light in reach, Elevated toilet seat, Patient to call for help with toileting needs, Toilet paper/wipes in reach, Toileting schedule/hourly rounds

## 2018-03-25 NOTE — PROGRESS NOTES
Hospitalist Progress Note  Teresa Keene MD  Answering service: [phone] from in house phone         Date of Service:  3/25/2018  NAME:  Clara Morning  :  1985  MRN:  [id number]  Admission Summary: This is a 28-year-old female with multiple medical problems including SLE, end-stage renal disease (on hemodialysis, TTS), pulmonary embolism, on Eliquis (), hyperlipidemia, hypertension, recent Candida peritonitis, chronic bilateral pain.  She comes over here because of abdominal pain since last 4 or 5 days.               Interval history / Subjective:     3/20:   Pt yet with abd pain but better, no n.v,  Had HD today,   - low albumin met with poor po intake. Has supplements. 3/21:  A bit more swollen , cont with lower abd pain, but eating ( little ) no new fever spikes. 3/22:  Less swollen post HD,  Yet with pain lower abd, yet eats poorly, abd exam benign, drain in     3/23:  Pt with cont pain, seems real , discussed case with RN;  Also discussed case with pt, the decision is to do another CT of abd/pelvis  to see if we can come upon some understanding of why she is experiencing such continued pain that is requiring routine dilaudid as hers is ,  It is noted that the oral oxy does not seem to work as well as the iv dilaudid , may consider asking palliative medicine to participate in this pain management     3/24:  CT from yesterday noting cont collection of fluid low and abd not changed since 3/17 cT. Will ask surg to see if they have options to help. Pt remains in pain.      3/25:  Radiology has reviewed films and US of abd and feel that the lt and rt crescent shaped fluid collection communicates well and no further intervention needed. ;plus no drain able abscess/fluid collection noted on US on left abdomen           Assessment & Plan:      Acute peritonitis  -recent candida peritonitis  -CT abd 3/11 Large amount of free intraperitoneal fluid appears somewhat loculated  -Follow cultures, blood culture 3/11 unremarkable. Peritoneal fluid heavy E coli  -Antibiotics per ID  -Was on Vanc/Zosyn/Fluconazole. Now Vanc/FLuconazole discontinued. Continue Cefepime  -s/p paracentesis, follow studies. With drain in place, draining serosanguinous now  - Repeat CT on 3/17 shows improvement on loculated fluid. Repeat fluid culture and cell count ordered by nephro on 3/17. C/s from 3/18 positive for ecoli , continue cefepime   Off imuran now; US 3/25/2018 no drain able fluid lt abdomen     E coli bacteremia/Sepsis   that the patient was found to have E coli bacteremia at HD   Lab Results   Component Value Date/Time    WBC 6.3 03/22/2018 04:03 AM     Temp Readings from Last 1 Encounters:   03/25/18 99 °F (37.2 °C)         Hypotension now with HTN  -Takes coreg 12.5 bid at home, now on 6.25,will increase as tolerated. -now resolved  -Was on stress steroids  -Now on regular oral steroids  BP Readings from Last 1 Encounters:   03/25/18 (!) 146/99         Thrombocytopenia   -Monitor closely, improving  -Switched Zosyn to cefepime. Stable at 142  Lab Results   Component Value Date/Time    PLATELET 783 82/00/8220 04:03 AM          Bilateral pedal edema  -Dopplers negative for DVT  - mild edema 3/20/2018      SLE  -continue home meds  -D/C'd Imuran, allopurinol by nephro with acute infection and thrombocytopenia.      History of PE  -on Eliquis since 2012  See note from previous hosptalist  -Appreciate discussion with PMD 3/13. The patient was seen by hematology in 2013 but seems was lost to follow up. I think at that time the plan was to stop anticoagulation if her SLE is stable. Spoke to Dr Harman Josue, he feels that the patient can come off Eliquis. His records do not suggest any recurrence of DVT/PE.  Spoke to Dr Gisela Mosqueda, he has not seen the patient since >1 yr. If antiphospholipid antibodies negative then the patient can come off Eliquis  -10/17 V/Q high probability for PE  -Per oncology the patient should be continued to Eliquis indefinitely      ESRD  -on HD TTS     Anemia  -of chronic disease  -Transfused 2 units PRBC with HD 3/15  Lab Results   Component Value Date/Time    Hemoglobin (POC) 7.8 (L) 01/19/2018 12:30 PM    HGB 8.5 (L) 03/22/2018 04:03 AM         Severe hepatic steatosis  -monitor     HTN  -stable  BP Readings from Last 1 Encounters:   03/25/18 (!) 146/99         Severe protein calorie malnutrition  -Consult nutrition  -nutritional supp  Lab Results   Component Value Date/Time    Protein, total 5.6 (L) 03/22/2018 04:03 AM    Albumin 1.0 (L) 03/22/2018 04:03 AM          Appreciate Nephro, ID, surgery input.     Renal diet     Code status: FULL CODE  DVT prophylaxis: Eliquis  PTA: home     Plan: Follow ID, nephrology, surgery     Care Plan discussed with: Patient/Family  Disposition: Home w/Family          Hospital Problems  Date Reviewed: 3/11/2018          Codes Class Noted POA    * (Principal)Peritonitis ([facility] [address] ICD-10-CM: F96.0  ICD-9-CM: 567.9  3/11/2018 Yes                Review of Systems: Yet wit pain but management med working. No n/v but poor apetite , no cp no sob     Vital Signs:    Last 24hrs VS reviewed since prior progress note. Most recent are:  Visit Vitals    BP (!) 146/99 (BP 1 Location: Left arm, BP Patient Position: At rest)    Pulse (!) 106    Temp 99 °F (37.2 °C)    Resp 16    Ht 5' 5\" (1.651 m)    Wt 66.8 kg (147 lb 4.3 oz)    SpO2 98%    BMI 24.51 kg/m2         Intake/Output Summary (Last 24 hours) at 03/25/18 1354  Last data filed at 03/25/18 7738   Gross per 24 hour   Intake                0 ml   Output             8030 ml   Net            -8030 ml        Physical Examination:             Constitutional:  No acute distress, cooperative, pleasant    ENT:  Oral mucous moist, oropharynx benign. Neck supple,    Resp:  CTA bilaterally. No wheezing/rhonchi/rales.  No accessory muscle use   CV: Regular rhythm, normal rate, no murmurs, gallops, rubs    GI:  Soft, non distended, mild lower abd tender. normoactive bowel sounds, no hepatosplenomegaly     Musculoskeletal:  No edema, warm, 2+ pulses throughout    Neurologic:  Moves all extremities. AAOx3, CN II-XII reviewed     Psych:  Good insight, Not anxious nor agitated. Skin:  Good turgor, no rashes or ulcers       Data Review:    Review and/or order of clinical lab test      Labs:     No results for input(s): WBC, HGB, HCT, PLT, HGBEXT, HCTEXT, PLTEXT, HGBEXT, HCTEXT, PLTEXT in the last 72 hours. No results for input(s): NA, K, CL, CO2, BUN, CREA, GLU, CA, MG, PHOS, URICA in the last 72 hours. No results for input(s): SGOT, GPT, ALT, AP, TBIL, TBILI, TP, ALB, GLOB, GGT, AML, LPSE in the last 72 hours. No lab exists for component: AMYP, HLPSE  Recent Labs      03/25/18   0434   INR  1.3*   PTP  13.5*   APTT  31.3      No results for input(s): FE, TIBC, PSAT, FERR in the last 72 hours. Lab Results   Component Value Date/Time    Folate 4.1 (L) 03/15/2018 10:32 AM      No results for input(s): PH, PCO2, PO2 in the last 72 hours. No results for input(s): CPK, CKNDX, TROIQ in the last 72 hours.     No lab exists for component: CPKMB  Lab Results   Component Value Date/Time    Cholesterol, total 117 09/25/2015 02:02 PM    HDL Cholesterol 31 (L) 09/25/2015 02:02 PM    LDL, calculated 57 09/25/2015 02:02 PM    Triglyceride 146 09/25/2015 02:02 PM    CHOL/HDL Ratio 7.7 (H) 05/31/2009 10:30 AM     Lab Results   Component Value Date/Time    Glucose (POC) 85 03/25/2018 11:44 AM    Glucose (POC) 90 03/25/2018 06:42 AM    Glucose (POC) 88 03/24/2018 09:22 PM    Glucose (POC) 83 03/24/2018 05:14 PM    Glucose (POC) 96 03/24/2018 11:38 AM     Lab Results   Component Value Date/Time    Color YELLOW/STRAW 08/12/2016 09:06 PM    Appearance CLEAR 08/12/2016 09:06 PM    Specific gravity 1.017 08/12/2016 09:06 PM    Specific gravity 1.010 07/11/2016 12:44 PM    pH (UA) 7.0 08/12/2016 09:06 PM    Protein 300 (A) 08/12/2016 09:06 PM    Glucose NEGATIVE  08/12/2016 09:06 PM    Ketone TRACE (A) 08/12/2016 09:06 PM    Bilirubin NEGATIVE  07/11/2016 12:44 PM    Urobilinogen 1.0 08/12/2016 09:06 PM    Nitrites NEGATIVE  08/12/2016 09:06 PM    Leukocyte Esterase NEGATIVE  08/12/2016 09:06 PM    Epithelial cells MODERATE (A) 08/12/2016 09:06 PM    Bacteria 1+ (A) 08/12/2016 09:06 PM    WBC 0-4 08/12/2016 09:06 PM    RBC 0-5 08/12/2016 09:06 PM         Medications Reviewed:     Current Facility-Administered Medications   Medication Dose Route Frequency    amLODIPine (NORVASC) tablet 5 mg  5 mg Oral DAILY    carvedilol (COREG) tablet 12.5 mg  12.5 mg Oral BID WITH MEALS    dextrose 5% and 0.9% NaCl infusion  50 mL/hr IntraVENous CONTINUOUS    hydrALAZINE (APRESOLINE) 20 mg/mL injection 5 mg  5 mg IntraVENous Q6H PRN    cefepime (MAXIPIME) 1 g in 0.9% sodium chloride (MBP/ADV) 50 mL ADV  1 g IntraVENous Q24H    apixaban (ELIQUIS) tablet 5 mg  5 mg Oral BID    0.9% sodium chloride infusion 250 mL  250 mL IntraVENous PRN    HYDROmorphone (DILAUDID) injection 0.5 mg  0.5 mg IntraVENous Q3H PRN    glucose chewable tablet 16 g  4 Tab Oral PRN    dextrose (D50W) injection syrg 12.5-25 g  12.5-25 g IntraVENous PRN    glucagon (GLUCAGEN) injection 1 mg  1 mg IntraMUSCular PRN    epoetin pia (EPOGEN;PROCRIT) 14,000 Units  14,000 Units SubCUTAneous DIALYSIS TUE, THU & SAT    insulin lispro (HUMALOG) injection   SubCUTAneous AC&HS    predniSONE (DELTASONE) tablet 5 mg  5 mg Oral DAILY    pantoprazole (PROTONIX) tablet 40 mg  40 mg Oral ACB    cyanocobalamin (VITAMIN B12) tablet 2,500 mcg  2,500 mcg Oral DAILY    hydroxychloroquine (PLAQUENIL) tablet 200 mg  200 mg Oral BID    albuterol (PROVENTIL VENTOLIN) nebulizer solution 2.5 mg  2.5 mg Nebulization Q4H PRN    gemfibrozil (LOPID) tablet 300 mg  300 mg Oral DAILY    oxyCODONE IR (ROXICODONE) tablet 2.5-5 mg  2.5-5 mg Oral Q6H PRN  amitriptyline (ELAVIL) tablet 25 mg  25 mg Oral QHS    sodium chloride (NS) flush 5-10 mL  5-10 mL IntraVENous Q8H    sodium chloride (NS) flush 5-10 mL  5-10 mL IntraVENous PRN    ondansetron (ZOFRAN) injection 4 mg  4 mg IntraVENous Q4H PRN    arformoterol (BROVANA) neb solution 15 mcg  15 mcg Nebulization BID RT    And    budesonide (PULMICORT) 500 mcg/2 ml nebulizer suspension  500 mcg Nebulization BID RT     ______________________________________________________________________  EXPECTED LENGTH OF STAY: 4d 21h  ACTUAL LENGTH OF STAY:          14                 Inocente Phillip MD

## 2018-03-26 ENCOUNTER — DOCUMENTATION ONLY (OUTPATIENT)
Dept: FAMILY MEDICINE CLINIC | Age: 33
End: 2018-03-26

## 2018-03-26 LAB
GLUCOSE BLD STRIP.AUTO-MCNC: 101 MG/DL (ref 65–100)
GLUCOSE BLD STRIP.AUTO-MCNC: 81 MG/DL (ref 65–100)
GLUCOSE BLD STRIP.AUTO-MCNC: 82 MG/DL (ref 65–100)
GLUCOSE BLD STRIP.AUTO-MCNC: 96 MG/DL (ref 65–100)
SERVICE CMNT-IMP: ABNORMAL
SERVICE CMNT-IMP: NORMAL

## 2018-03-26 PROCEDURE — 82962 GLUCOSE BLOOD TEST: CPT

## 2018-03-26 PROCEDURE — 74011250637 HC RX REV CODE- 250/637: Performed by: INTERNAL MEDICINE

## 2018-03-26 PROCEDURE — 74011250636 HC RX REV CODE- 250/636: Performed by: HOSPITALIST

## 2018-03-26 PROCEDURE — 74011000250 HC RX REV CODE- 250: Performed by: HOSPITALIST

## 2018-03-26 PROCEDURE — 74011636637 HC RX REV CODE- 636/637: Performed by: HOSPITALIST

## 2018-03-26 PROCEDURE — 94640 AIRWAY INHALATION TREATMENT: CPT

## 2018-03-26 PROCEDURE — 65270000029 HC RM PRIVATE

## 2018-03-26 PROCEDURE — 74011250637 HC RX REV CODE- 250/637: Performed by: HOSPITALIST

## 2018-03-26 PROCEDURE — 97530 THERAPEUTIC ACTIVITIES: CPT

## 2018-03-26 PROCEDURE — 74011250636 HC RX REV CODE- 250/636: Performed by: INTERNAL MEDICINE

## 2018-03-26 PROCEDURE — 74011000258 HC RX REV CODE- 258: Performed by: INTERNAL MEDICINE

## 2018-03-26 RX ADMIN — HYDROMORPHONE HYDROCHLORIDE 0.5 MG: 2 INJECTION INTRAMUSCULAR; INTRAVENOUS; SUBCUTANEOUS at 10:52

## 2018-03-26 RX ADMIN — HYDROMORPHONE HYDROCHLORIDE 0.5 MG: 2 INJECTION INTRAMUSCULAR; INTRAVENOUS; SUBCUTANEOUS at 04:05

## 2018-03-26 RX ADMIN — HYDROMORPHONE HYDROCHLORIDE 0.5 MG: 2 INJECTION INTRAMUSCULAR; INTRAVENOUS; SUBCUTANEOUS at 07:27

## 2018-03-26 RX ADMIN — APIXABAN 5 MG: 5 TABLET, FILM COATED ORAL at 17:16

## 2018-03-26 RX ADMIN — PREDNISONE 5 MG: 5 TABLET ORAL at 09:33

## 2018-03-26 RX ADMIN — PANTOPRAZOLE SODIUM 40 MG: 40 TABLET, DELAYED RELEASE ORAL at 09:34

## 2018-03-26 RX ADMIN — AMLODIPINE BESYLATE 5 MG: 5 TABLET ORAL at 09:33

## 2018-03-26 RX ADMIN — APIXABAN 5 MG: 5 TABLET, FILM COATED ORAL at 09:34

## 2018-03-26 RX ADMIN — Medication 2500 MCG: at 09:33

## 2018-03-26 RX ADMIN — SODIUM CHLORIDE 1 G: 900 INJECTION, SOLUTION INTRAVENOUS at 19:32

## 2018-03-26 RX ADMIN — CARVEDILOL 12.5 MG: 12.5 TABLET, FILM COATED ORAL at 09:34

## 2018-03-26 RX ADMIN — HYDROMORPHONE HYDROCHLORIDE 0.5 MG: 2 INJECTION INTRAMUSCULAR; INTRAVENOUS; SUBCUTANEOUS at 20:10

## 2018-03-26 RX ADMIN — HYDROXYCHLOROQUINE SULFATE 200 MG: 200 TABLET, FILM COATED ORAL at 09:33

## 2018-03-26 RX ADMIN — BUDESONIDE 500 MCG: 0.5 INHALANT RESPIRATORY (INHALATION) at 21:51

## 2018-03-26 RX ADMIN — DEXTROSE MONOHYDRATE AND SODIUM CHLORIDE 50 ML/HR: 5; .9 INJECTION, SOLUTION INTRAVENOUS at 05:56

## 2018-03-26 RX ADMIN — HYDROMORPHONE HYDROCHLORIDE 0.5 MG: 2 INJECTION INTRAMUSCULAR; INTRAVENOUS; SUBCUTANEOUS at 17:15

## 2018-03-26 RX ADMIN — ARFORMOTEROL TARTRATE 15 MCG: 15 SOLUTION RESPIRATORY (INHALATION) at 21:51

## 2018-03-26 RX ADMIN — HYDROMORPHONE HYDROCHLORIDE 0.5 MG: 2 INJECTION INTRAMUSCULAR; INTRAVENOUS; SUBCUTANEOUS at 23:45

## 2018-03-26 RX ADMIN — AMITRIPTYLINE HYDROCHLORIDE 25 MG: 10 TABLET, FILM COATED ORAL at 21:56

## 2018-03-26 RX ADMIN — CARVEDILOL 12.5 MG: 12.5 TABLET, FILM COATED ORAL at 17:16

## 2018-03-26 RX ADMIN — HYDROXYCHLOROQUINE SULFATE 200 MG: 200 TABLET, FILM COATED ORAL at 17:16

## 2018-03-26 RX ADMIN — Medication 10 ML: at 07:25

## 2018-03-26 RX ADMIN — HYDROMORPHONE HYDROCHLORIDE 0.5 MG: 2 INJECTION INTRAMUSCULAR; INTRAVENOUS; SUBCUTANEOUS at 00:24

## 2018-03-26 RX ADMIN — HYDROMORPHONE HYDROCHLORIDE 0.5 MG: 2 INJECTION INTRAMUSCULAR; INTRAVENOUS; SUBCUTANEOUS at 14:11

## 2018-03-26 RX ADMIN — ARFORMOTEROL TARTRATE 15 MCG: 15 SOLUTION RESPIRATORY (INHALATION) at 08:09

## 2018-03-26 RX ADMIN — GEMFIBROZIL 300 MG: 600 TABLET ORAL at 09:32

## 2018-03-26 RX ADMIN — BUDESONIDE 500 MCG: 0.5 INHALANT RESPIRATORY (INHALATION) at 08:09

## 2018-03-26 NOTE — PROGRESS NOTES
Jose Ruiz  is a 29 y. o.female  with a history of SLE, lupus nephritis leading to ESRD //HTN/ treated for candida peritonitis in Dec 2017  Is admitted with abdominal pain and fever since last Thursday  Pt was in [facility] in Dec 2017 for candida peritonitis and PD was stopped and she was started on HD thru a catheter. She took 4 weeks of fluconazole and was doing fine She had a graft placed on the rt arm on 1/19/19 - She developed fever and abdominal pain on 3/6 and blood culture sent from the catheter at dialysis center- The catheter was removed on 3/7 and the graft was accessed last week-   She was given IV gent  at the dialysis center.  As the abdominal pain was getting worse she came to the ED on 3/11 and was admitted  CT abdomen showed Large amount of free intraperitoneal fluid that is  loculated.         Multiple hospitalizations in the past few months  OCt 14-17 /2017 for left lower lobe pneumonia/effusion- was found to have PE on the rt side  10/27-10/30 possible pneumonia- same infiltrate left lower lobe- sent on augmentin     11/26-/11/28 for b/l leg swelling and abdominal swelling     12/12/17-12/22/17- fungal peritonitis- treated with fluconazole for 4 weeks     She had  peritoneal dialysis since 2015- had cath placed in 2015 until it was removed in Dec 2017      subjective  C/o swelling rt arm, rt breast and rt leg  Objective:   VITALS:    Patient Vitals for the past 12 hrs:   Temp Pulse Resp BP SpO2   03/26/18 1439 99.2 °F (37.3 °C) 97 18 115/87 100 %   03/26/18 0913 99.4 °F (37.4 °C) (!) 104 16 116/90 96 %   03/26/18 0810 - - - - 98 %   PHYSICAL EXAM:   General:                   no distress  Lungs:                       B/l air entry     Heart:                                  s1s2  Abdomen:                  Drain -purulent reddish fluid  Tender lower abdomen  Extremities:               Rt arm graft   Arm swollen  Rt breast swollen  Skin:                                    Dry skin- striae over arms/abdomen  Lymph:                      Cervical, supraclavicular normal.  Neurologic:                Grossly non-focal     Pertinent Labs  Wbc 2.5>6.4   Hb 8.4  PLT 91  Lactate 1.5  Albumin 1.5  Peritoneal fluid- 3524 WBC ( 85% N)  E.coli     IMAGING RESULTS:             Impression/Recommendation    33 yr female with SLE/ lupus nephritis/HTN -h/o peritoneal dialysis until Dec 2017 /Candida peritonitis in Dec 2017/ treated with 4 weeks of fluconazole     Admitted with abdominal pain and fever of  5 days     E.coli  peritonitis with e.coli bacteremia  Loculated peritoneal fluid- has a drain- repeat imaging from 3/17 still shows a collection but significantly reduced- She has pockets of fluid and will have to make sure that it is all completely drained - RPT CT scan-shows  a crescentic fluid collection in the anterior peritoneum which extends from left to right - significantly reduced from the 1st CT on 3/11 but same as the CT from 3/17- the fluid will have to be completely drained or else risk for recurrence  Continue cefepime for a week after complete resolution of the peritoneal collection  Can be given during dialysis days- 2 grams three times a week after dialysis     Edema rt arm. Rt breast - ?  Doppler to r/o thrombosis of the graft/vein rt arm    SLE- on plaquenil and low dose prednisone- Imuran on hold  Discussed with patient and her family

## 2018-03-26 NOTE — ROUTINE PROCESS
Bedside shift change report given to DANITZA Styles (oncoming nurse) by Tico Sun RN(offgoing nurse). Report given with SBAR.

## 2018-03-26 NOTE — PROGRESS NOTES
Problem: Falls - Risk of  Goal: *Absence of Falls  Document Alex Fall Risk and appropriate interventions in the flowsheet.    Outcome: Progressing Towards Goal  Fall Risk Interventions:  Mobility Interventions: Communicate number of staff needed for ambulation/transfer, OT consult for ADLs, Patient to call before getting OOB, PT Consult for mobility concerns, PT Consult for assist device competence    Mentation Interventions: Adequate sleep, hydration, pain control    Medication Interventions: Patient to call before getting OOB    Elimination Interventions: Call light in reach, Patient to call for help with toileting needs

## 2018-03-26 NOTE — PROGRESS NOTES
Problem: Mobility Impaired (Adult and Pediatric)  Goal: *Acute Goals and Plan of Care (Insert Text)  Physical Therapy Goals  Initiated 3/16/2018  1. Patient will move from supine to sit and sit to supine  in bed with supervision/set-up within 7 day(s). 2.  Patient will transfer from bed to chair and chair to bed with supervision/set-up using the least restrictive device within 7 day(s). 3.  Patient will perform sit to stand with minimal assistance/contact guard assist within 7 day(s). 4.  Patient will ambulate with minimal assistance/contact guard assist for 50 feet with the least restrictive device within 7 day(s). physical Therapy TREATMENT  Patient: Mya Benoit (30 y.o. female)  Date: 3/26/2018  Diagnosis: Peritonitis ([address] Peritonitis ([facility] [address]       Precautions: Fall (abd drain)  Chart, physical therapy assessment, plan of care and goals were reviewed. ASSESSMENT:  Patient reports increased pain today and does not feel she can ambulate or even transfer to the chair until she has her next pain medication at 2:00. She did agree to get up to EOB and worked on sit to stand practice some with the bed height elevated. She requires max assist for sit to stand, partially due to LE weakness and partially due to not shifting her weight anteriorly enough. She did remain in sitting EOB after exercise, but again would not transfer to the chair at this time. She continues to be limited by pain and weakness in her overall functional independence and would benefit from rehab prior to return home once medically ready. Progression toward goals:  []    Improving appropriately and progressing toward goals  [x]    Improving slowly and progressing toward goals  []    Not making progress toward goals and plan of care will be adjusted     PLAN:  Patient continues to benefit from skilled intervention to address the above impairments. Continue treatment per established plan of care.   Discharge Recommendations: Rehab  Further Equipment Recommendations for Discharge: To be determined in rehab     SUBJECTIVE:   Patient stated My side hurts too bad to sit up right now.     OBJECTIVE DATA SUMMARY:   Critical Behavior:  Neurologic State: Alert  Orientation Level: Oriented X4  Cognition: Appropriate decision making, Appropriate for age attention/concentration, Appropriate safety awareness, Follows commands  Safety/Judgement: Awareness of environment  Functional Mobility Training:  Bed Mobility:     Supine to Sit: Minimum assistance; Additional time              Transfers:  Sit to Stand: Maximum assistance; Adaptive equipment; Additional time  Stand to Sit: Minimum assistance; Adaptive equipment; Additional time                             Balance:  Sitting: Intact; Without support  Standing: Impaired; With support  Standing - Static: Fair  Standing - Dynamic : Fair  Ambulation/Gait Training:  Distance (ft): 2 Feet (ft)  Assistive Device: Walker, rolling  Ambulation - Level of Assistance: Minimal assistance; Adaptive equipment; Additional time        Gait Abnormalities: Decreased step clearance;Shuffling gait; Step to gait (heavy lean on UEs on the walker)        Base of Support: Widened;Center of gravity altered     Speed/Vanita: Pace decreased (<100 feet/min); Shuffled  Step Length: Left shortened;Right shortened                    Stairs: Therapeutic Exercises:   Partial sit to stand repeated at bedside  Pain:  Pain Scale 1: Numeric (0 - 10)  Pain Intensity 1: 9  Pain Location 1: Abdomen  Pain Orientation 1: Left  Pain Description 1: Aching  Pain Intervention(s) 1: Medication (see MAR)  Activity Tolerance:   Limited by pain  Please refer to the flowsheet for vital signs taken during this treatment.   After treatment:   []    Patient left in no apparent distress sitting up in chair  [x]    Patient left in no apparent distress in bed  [x]    Call bell left within reach  [x]    Nursing notified  []    Caregiver present  [] Bed alarm activated    COMMUNICATION/COLLABORATION:   The patients plan of care was discussed with: Registered Nurse    Alexandra Hanson, PT   Time Calculation: 10 mins

## 2018-03-26 NOTE — PROGRESS NOTES
Patient name: Benjamin Dear  MRN: [id number]    Nephrology Progress note:    Assessment:  ESRD: on HD [facility] - No acute need for HD today. Plan HD in AM.    E. Coli Bacteremia (Hard copy placed in chart): source intra-abd source/ E. Coli peritonitis. IV zosyn. Repeat Bld Cx 3/11-> Neg. Recent hx of Candida peritonitis    HTN:    Hx of PE: on Eliquis    Lupus: Anemia 2 to ESRD/Lupus: Hgb below goal - on EPO    SHPT    Protein malnutrition           Subjective: Annabella Arreola may have to put another drain in. \"  No pain at present. No N/V. No dyspnea.          Exam:  Visit Vitals    /90 (BP 1 Location: Left arm, BP Patient Position: At rest;Lying right side)    Pulse (!) 104    Temp 99.4 °F (37.4 °C)    Resp 16    Ht 5' 5\" (1.651 m)    Wt 65.6 kg (144 lb 11.2 oz)    SpO2 96%    BMI 24.08 kg/m2     Wt Readings from Last 3 Encounters:   03/26/18 65.6 kg (144 lb 11.2 oz)   03/05/18 68 kg (150 lb)   01/29/18 69.4 kg (153 lb)       Intake/Output Summary (Last 24 hours) at 03/26/18 1130  Last data filed at 03/26/18 0556   Gross per 24 hour   Intake                0 ml   Output               80 ml   Net              -80 ml     No edema  NAD    Labs/Data:    Lab Results   Component Value Date/Time    WBC 6.3 03/22/2018 04:03 AM    Hemoglobin (POC) 7.8 (L) 01/19/2018 12:30 PM    HGB 8.5 (L) 03/22/2018 04:03 AM    Hematocrit (POC) 23 (L) 01/19/2018 12:30 PM    HCT 27.8 (L) 03/22/2018 04:03 AM    PLATELET 563 18/59/8883 04:03 AM    MCV 83.2 03/22/2018 04:03 AM Lab Results   Component Value Date/Time    Sodium 138 03/22/2018 04:03 AM    Potassium 4.0 03/22/2018 04:03 AM    Chloride 103 03/22/2018 04:03 AM    CO2 28 03/22/2018 04:03 AM    Anion gap 7 03/22/2018 04:03 AM    Glucose 78 03/22/2018 04:03 AM    BUN 17 03/22/2018 04:03 AM    Creatinine 4.18 (H) 03/22/2018 04:03 AM    BUN/Creatinine ratio 4 (L) 03/22/2018 04:03 AM    GFR est AA 15 (L) 03/22/2018 04:03 AM    GFR est non-AA 12 (L) 03/22/2018 04:03 AM    Calcium 8.1 (L) 03/22/2018 04:03 AM

## 2018-03-26 NOTE — PROGRESS NOTES
Hospitalist Progress Note  Cristiana Tna MD  Answering service: [phone] from in house phone         Date of Service:  3/26/2018  NAME:  Sarthak Glover  :  1985  MRN:  [id number]  Admission Summary: This is a 78-year-old female with multiple medical problems including SLE, end-stage renal disease (on hemodialysis, TTS), pulmonary embolism, on Eliquis (), hyperlipidemia, hypertension, recent Candida peritonitis, chronic bilateral pain.  She comes over here because of abdominal pain since last 4 or 5 days.               Interval history / Subjective:     3/20:   Pt yet with abd pain but better, no n.v,  Had HD today,   - low albumin met with poor po intake. Has supplements. 3/21:  A bit more swollen , cont with lower abd pain, but eating ( little ) no new fever spikes. 3/22:  Less swollen post HD,  Yet with pain lower abd, yet eats poorly, abd exam benign, drain in     3/23:  Pt with cont pain, seems real , discussed case with RN;  Also discussed case with pt, the decision is to do another CT of abd/pelvis  to see if we can come upon some understanding of why she is experiencing such continued pain that is requiring routine dilaudid as hers is ,  It is noted that the oral oxy does not seem to work as well as the iv dilaudid , may consider asking palliative medicine to participate in this pain management     3/24:  CT from yesterday noting cont collection of fluid low and abd not changed since 3/17 cT. Will ask surg to see if they have options to help. Pt remains in pain.      3/25:  Radiology has reviewed films and US of abd and feel that the lt and rt crescent shaped fluid collection communicates well and no further intervention needed. ;plus no drain able abscess/fluid collection noted on US on left abdomen    3/26:  Not eating much, had good night, no new issues.            Assessment & Plan:      Acute peritonitis  -recent candida peritonitis  -CT abd 3/11 Large amount of free intraperitoneal fluid appears somewhat loculated  -Follow cultures, blood culture 3/11 unremarkable. Peritoneal fluid heavy E coli  -Antibiotics per ID  -Was on Vanc/Zosyn/Fluconazole. Now Vanc/FLuconazole discontinued. Continue Cefepime  -s/p paracentesis, follow studies. With drain in place, draining serosanguinous now  - Repeat CT on 3/17 shows improvement on loculated fluid. Repeat fluid culture and cell count ordered by nephro on 3/17. C/s from 3/18 positive for ecoli , continue cefepime   Off imuran now; US 3/26/2018 no drain able fluid lt abdomen     E coli bacteremia/Sepsis   that the patient was found to have E coli bacteremia at HD   Lab Results   Component Value Date/Time    WBC 6.3 03/22/2018 04:03 AM     Temp Readings from Last 1 Encounters:   03/26/18 99.4 °F (37.4 °C)         Hypotension now with HTN  -Takes coreg 12.5 bid at home, now on 6.25,will increase as tolerated. -now resolved  -Was on stress steroids  -Now on regular oral steroids  BP Readings from Last 1 Encounters:   03/26/18 116/90         Thrombocytopenia   -Monitor closely, improving  -Switched Zosyn to cefepime. Stable at 142  Lab Results   Component Value Date/Time    PLATELET 910 78/55/9819 04:03 AM          Bilateral pedal edema  -Dopplers negative for DVT  - mild edema 3/20/2018      SLE  -continue home meds  -D/C'd Imuran, allopurinol by nephro with acute infection and thrombocytopenia.      History of PE  -on Eliquis since 2012  See note from previous hosptalist  -Appreciate discussion with PMD 3/13. The patient was seen by hematology in 2013 but seems was lost to follow up. I think at that time the plan was to stop anticoagulation if her SLE is stable. Spoke to Dr Kenny Karimi, he feels that the patient can come off Eliquis. His records do not suggest any recurrence of DVT/PE.  Spoke to Dr Yovani Schroeder, he has not seen the patient since >1 yr. If antiphospholipid antibodies negative then the patient can come off Eliquis  -10/17 V/Q high probability for PE  -Per oncology the patient should be continued to Eliquis indefinitely      ESRD  -on HD TTS     Anemia  -of chronic disease  -Transfused 2 units PRBC with HD 3/15  Lab Results   Component Value Date/Time    Hemoglobin (POC) 7.8 (L) 01/19/2018 12:30 PM    HGB 8.5 (L) 03/22/2018 04:03 AM         Severe hepatic steatosis  -monitor     HTN  -stable  BP Readings from Last 1 Encounters:   03/26/18 116/90         Severe protein calorie malnutrition  -Consult nutrition  -nutritional supp  Lab Results   Component Value Date/Time    Protein, total 5.6 (L) 03/22/2018 04:03 AM    Albumin 1.0 (L) 03/22/2018 04:03 AM          Appreciate Nephro, ID, surgery input.     Renal diet     Code status: FULL CODE  DVT prophylaxis: Eliquis  PTA: home     Plan: Follow ID, nephrology, surgery     Care Plan discussed with: Patient/Family  Disposition: Home w/Family          Hospital Problems  Date Reviewed: 3/11/2018          Codes Class Noted POA    * (Principal)Peritonitis ([facility] [address] ICD-10-CM: K65.9  ICD-9-CM: 567.9  3/11/2018 Yes                Review of Systems:   Stable ROS Yet wit pain but management med working. No n/v but poor apetite , no cp no sob     Vital Signs:    Last 24hrs VS reviewed since prior progress note.  Most recent are:  Visit Vitals    /90 (BP 1 Location: Left arm, BP Patient Position: At rest;Lying right side)    Pulse (!) 104    Temp 99.4 °F (37.4 °C)    Resp 16    Ht 5' 5\" (1.651 m)    Wt 65.6 kg (144 lb 11.2 oz)    SpO2 96%    BMI 24.08 kg/m2         Intake/Output Summary (Last 24 hours) at 03/26/18 1136  Last data filed at 03/26/18 0556   Gross per 24 hour   Intake                0 ml   Output               80 ml   Net              -80 ml        Physical Examination:           Stable exam 3/26/2018 over past 24 hours   Constitutional:  No acute distress, cooperative, pleasant    ENT:  Oral mucous moist, oropharynx benign. Neck supple,    Resp:  CTA bilaterally. No wheezing/rhonchi/rales. No accessory muscle use   CV:  Regular rhythm, normal rate, no murmurs, gallops, rubs    GI:  Soft, non distended, mild lower abd tender. normoactive bowel sounds, no hepatosplenomegaly     Musculoskeletal:  No edema, warm, 2+ pulses throughout    Neurologic:  Moves all extremities. AAOx3, CN II-XII reviewed     Psych:  Good insight, Not anxious nor agitated. Skin:  Good turgor, no rashes or ulcers       Data Review:    Review and/or order of clinical lab test      Labs:     No results for input(s): WBC, HGB, HCT, PLT, HGBEXT, HCTEXT, PLTEXT, HGBEXT, HCTEXT, PLTEXT in the last 72 hours. No results for input(s): NA, K, CL, CO2, BUN, CREA, GLU, CA, MG, PHOS, URICA in the last 72 hours. No results for input(s): SGOT, GPT, ALT, AP, TBIL, TBILI, TP, ALB, GLOB, GGT, AML, LPSE in the last 72 hours. No lab exists for component: AMYP, HLPSE  Recent Labs      03/25/18   0434   INR  1.3*   PTP  13.5*   APTT  31.3      No results for input(s): FE, TIBC, PSAT, FERR in the last 72 hours. Lab Results   Component Value Date/Time    Folate 4.1 (L) 03/15/2018 10:32 AM      No results for input(s): PH, PCO2, PO2 in the last 72 hours. No results for input(s): CPK, CKNDX, TROIQ in the last 72 hours.     No lab exists for component: CPKMB  Lab Results   Component Value Date/Time    Cholesterol, total 117 09/25/2015 02:02 PM    HDL Cholesterol 31 (L) 09/25/2015 02:02 PM    LDL, calculated 57 09/25/2015 02:02 PM    Triglyceride 146 09/25/2015 02:02 PM    CHOL/HDL Ratio 7.7 (H) 05/31/2009 10:30 AM     Lab Results   Component Value Date/Time    Glucose (POC) 81 03/26/2018 06:33 AM    Glucose (POC) 93 03/25/2018 09:56 PM    Glucose (POC) 117 (H) 03/25/2018 05:01 PM    Glucose (POC) 85 03/25/2018 11:44 AM    Glucose (POC) 90 03/25/2018 06:42 AM     Lab Results   Component Value Date/Time    Color YELLOW/STRAW 08/12/2016 09:06 PM Appearance CLEAR 08/12/2016 09:06 PM    Specific gravity 1.017 08/12/2016 09:06 PM    Specific gravity 1.010 07/11/2016 12:44 PM    pH (UA) 7.0 08/12/2016 09:06 PM    Protein 300 (A) 08/12/2016 09:06 PM    Glucose NEGATIVE  08/12/2016 09:06 PM    Ketone TRACE (A) 08/12/2016 09:06 PM    Bilirubin NEGATIVE  07/11/2016 12:44 PM    Urobilinogen 1.0 08/12/2016 09:06 PM    Nitrites NEGATIVE  08/12/2016 09:06 PM    Leukocyte Esterase NEGATIVE  08/12/2016 09:06 PM    Epithelial cells MODERATE (A) 08/12/2016 09:06 PM    Bacteria 1+ (A) 08/12/2016 09:06 PM    WBC 0-4 08/12/2016 09:06 PM    RBC 0-5 08/12/2016 09:06 PM         Medications Reviewed:     Current Facility-Administered Medications   Medication Dose Route Frequency    amLODIPine (NORVASC) tablet 5 mg  5 mg Oral DAILY    carvedilol (COREG) tablet 12.5 mg  12.5 mg Oral BID WITH MEALS    dextrose 5% and 0.9% NaCl infusion  50 mL/hr IntraVENous CONTINUOUS    hydrALAZINE (APRESOLINE) 20 mg/mL injection 5 mg  5 mg IntraVENous Q6H PRN    cefepime (MAXIPIME) 1 g in 0.9% sodium chloride (MBP/ADV) 50 mL ADV  1 g IntraVENous Q24H    apixaban (ELIQUIS) tablet 5 mg  5 mg Oral BID    0.9% sodium chloride infusion 250 mL  250 mL IntraVENous PRN    HYDROmorphone (DILAUDID) injection 0.5 mg  0.5 mg IntraVENous Q3H PRN    glucose chewable tablet 16 g  4 Tab Oral PRN    dextrose (D50W) injection syrg 12.5-25 g  12.5-25 g IntraVENous PRN    glucagon (GLUCAGEN) injection 1 mg  1 mg IntraMUSCular PRN    epoetin pia (EPOGEN;PROCRIT) 14,000 Units  14,000 Units SubCUTAneous DIALYSIS TUE, THU & SAT    insulin lispro (HUMALOG) injection   SubCUTAneous AC&HS    predniSONE (DELTASONE) tablet 5 mg  5 mg Oral DAILY    pantoprazole (PROTONIX) tablet 40 mg  40 mg Oral ACB    cyanocobalamin (VITAMIN B12) tablet 2,500 mcg  2,500 mcg Oral DAILY    hydroxychloroquine (PLAQUENIL) tablet 200 mg  200 mg Oral BID    albuterol (PROVENTIL VENTOLIN) nebulizer solution 2.5 mg  2.5 mg Nebulization Q4H PRN    gemfibrozil (LOPID) tablet 300 mg  300 mg Oral DAILY    oxyCODONE IR (ROXICODONE) tablet 2.5-5 mg  2.5-5 mg Oral Q6H PRN    amitriptyline (ELAVIL) tablet 25 mg  25 mg Oral QHS    sodium chloride (NS) flush 5-10 mL  5-10 mL IntraVENous Q8H    sodium chloride (NS) flush 5-10 mL  5-10 mL IntraVENous PRN    ondansetron (ZOFRAN) injection 4 mg  4 mg IntraVENous Q4H PRN    arformoterol (BROVANA) neb solution 15 mcg  15 mcg Nebulization BID RT    And    budesonide (PULMICORT) 500 mcg/2 ml nebulizer suspension  500 mcg Nebulization BID RT     ______________________________________________________________________  EXPECTED LENGTH OF STAY: 4d 21h  ACTUAL LENGTH OF STAY:          15                 Telma Elena MD

## 2018-03-26 NOTE — PROGRESS NOTES
General Surgery Daily Progress Note    Admit Date: 3/11/2018  Post-Operative Day: * No surgery found * from * No surgery found *     Subjective:     Last 24 hrs: C/o peristent L-sided abdominal pain, comes & goes. Had episode of vomitting yesterday. No NV today. Tolerating regular diet. Voiding, +BM  No fevers or chills. Abdominal U/S (3/25/2018) showed:  IMPRESSION:   Sonographic images of the abdomen demonstrate no persistent drainable fluid  within the left abdomen. In addition, the right peritoneal drainage catheter  should communicate with the entire peritoneal fluid collection as demonstrated  on the prior CT imaging. There is no indication for additional peritoneal  drainage catheter placement. Objective:     Blood pressure 116/90, pulse (!) 104, temperature 99.4 °F (37.4 °C), resp. rate 16, height 5' 5\" (1.651 m), weight 144 lb 11.2 oz (65.6 kg), SpO2 96 %, unknown if currently breastfeeding. Temp (24hrs), Av.7 °F (37.1 °C), Min:98.2 °F (36.8 °C), Max:99.4 °F (37.4 °C)      _____________________  Physical Exam:     Alert and Oriented, cooperative, in no acute distress. Cardiovascular: RRR, trace peripheral edema  Lungs:CTAB   Abdomen: soft, flat +L-sided TTP. +BS  Pigtail catheter noted on R side. Assessment:   Principal Problem:    Peritonitis ([address] (3/11/2018)            Plan:     No surgical intervention warranted at this time. Continue IV antibiotics. Analgesics & antiemetics as needed. Will follow along. Data Review:    No results for input(s): WBC, HGB, HCT, PLT, HGBEXT, HCTEXT, PLTEXT in the last 72 hours. Recent Labs      18   0434   INR  1.3*     No results for input(s): AML, LPSE in the last 72 hours.         ______________________  Medications:    Current Facility-Administered Medications   Medication Dose Route Frequency    amLODIPine (NORVASC) tablet 5 mg  5 mg Oral DAILY    carvedilol (COREG) tablet 12.5 mg  12.5 mg Oral BID WITH MEALS    dextrose 5% and 0.9% NaCl infusion  50 mL/hr IntraVENous CONTINUOUS    hydrALAZINE (APRESOLINE) 20 mg/mL injection 5 mg  5 mg IntraVENous Q6H PRN    cefepime (MAXIPIME) 1 g in 0.9% sodium chloride (MBP/ADV) 50 mL ADV  1 g IntraVENous Q24H    apixaban (ELIQUIS) tablet 5 mg  5 mg Oral BID    0.9% sodium chloride infusion 250 mL  250 mL IntraVENous PRN    HYDROmorphone (DILAUDID) injection 0.5 mg  0.5 mg IntraVENous Q3H PRN    glucose chewable tablet 16 g  4 Tab Oral PRN    dextrose (D50W) injection syrg 12.5-25 g  12.5-25 g IntraVENous PRN    glucagon (GLUCAGEN) injection 1 mg  1 mg IntraMUSCular PRN    epoetin pia (EPOGEN;PROCRIT) 14,000 Units  14,000 Units SubCUTAneous DIALYSIS TUE, THU & SAT    insulin lispro (HUMALOG) injection   SubCUTAneous AC&HS    predniSONE (DELTASONE) tablet 5 mg  5 mg Oral DAILY    pantoprazole (PROTONIX) tablet 40 mg  40 mg Oral ACB    cyanocobalamin (VITAMIN B12) tablet 2,500 mcg  2,500 mcg Oral DAILY    hydroxychloroquine (PLAQUENIL) tablet 200 mg  200 mg Oral BID    albuterol (PROVENTIL VENTOLIN) nebulizer solution 2.5 mg  2.5 mg Nebulization Q4H PRN    gemfibrozil (LOPID) tablet 300 mg  300 mg Oral DAILY    oxyCODONE IR (ROXICODONE) tablet 2.5-5 mg  2.5-5 mg Oral Q6H PRN    amitriptyline (ELAVIL) tablet 25 mg  25 mg Oral QHS    sodium chloride (NS) flush 5-10 mL  5-10 mL IntraVENous Q8H    sodium chloride (NS) flush 5-10 mL  5-10 mL IntraVENous PRN    ondansetron (ZOFRAN) injection 4 mg  4 mg IntraVENous Q4H PRN    arformoterol (BROVANA) neb solution 15 mcg  15 mcg Nebulization BID RT    And    budesonide (PULMICORT) 500 mcg/2 ml nebulizer suspension  500 mcg Nebulization BID RT       Alejandra Cespedes PA-C  3/26/2018

## 2018-03-27 LAB
ANION GAP SERPL CALC-SCNC: 8 MMOL/L (ref 5–15)
BUN SERPL-MCNC: 11 MG/DL (ref 6–20)
BUN/CREAT SERPL: 3 (ref 12–20)
CALCIUM SERPL-MCNC: 7.6 MG/DL (ref 8.5–10.1)
CHLORIDE SERPL-SCNC: 105 MMOL/L (ref 97–108)
CO2 SERPL-SCNC: 26 MMOL/L (ref 21–32)
CREAT SERPL-MCNC: 4.01 MG/DL (ref 0.55–1.02)
ERYTHROCYTE [DISTWIDTH] IN BLOOD BY AUTOMATED COUNT: 19.3 % (ref 11.5–14.5)
GLUCOSE BLD STRIP.AUTO-MCNC: 106 MG/DL (ref 65–100)
GLUCOSE BLD STRIP.AUTO-MCNC: 120 MG/DL (ref 65–100)
GLUCOSE BLD STRIP.AUTO-MCNC: 73 MG/DL (ref 65–100)
GLUCOSE BLD STRIP.AUTO-MCNC: 73 MG/DL (ref 65–100)
GLUCOSE BLD STRIP.AUTO-MCNC: 87 MG/DL (ref 65–100)
GLUCOSE BLD STRIP.AUTO-MCNC: 99 MG/DL (ref 65–100)
GLUCOSE SERPL-MCNC: 87 MG/DL (ref 65–100)
HCT VFR BLD AUTO: 27.3 % (ref 35–47)
HGB BLD-MCNC: 8.3 G/DL (ref 11.5–16)
MCH RBC QN AUTO: 25.3 PG (ref 26–34)
MCHC RBC AUTO-ENTMCNC: 30.4 G/DL (ref 30–36.5)
MCV RBC AUTO: 83.2 FL (ref 80–99)
NRBC # BLD: 0 K/UL (ref 0–0.01)
NRBC BLD-RTO: 0 PER 100 WBC
PLATELET # BLD AUTO: 159 K/UL (ref 150–400)
PMV BLD AUTO: 10.9 FL (ref 8.9–12.9)
POTASSIUM SERPL-SCNC: 4.1 MMOL/L (ref 3.5–5.1)
RBC # BLD AUTO: 3.28 M/UL (ref 3.8–5.2)
SERVICE CMNT-IMP: ABNORMAL
SERVICE CMNT-IMP: ABNORMAL
SERVICE CMNT-IMP: NORMAL
SODIUM SERPL-SCNC: 139 MMOL/L (ref 136–145)
WBC # BLD AUTO: 4.1 K/UL (ref 3.6–11)

## 2018-03-27 PROCEDURE — 74011250637 HC RX REV CODE- 250/637: Performed by: INTERNAL MEDICINE

## 2018-03-27 PROCEDURE — 74011000250 HC RX REV CODE- 250: Performed by: HOSPITALIST

## 2018-03-27 PROCEDURE — 74011250636 HC RX REV CODE- 250/636: Performed by: HOSPITALIST

## 2018-03-27 PROCEDURE — 94640 AIRWAY INHALATION TREATMENT: CPT

## 2018-03-27 PROCEDURE — 93971 EXTREMITY STUDY: CPT

## 2018-03-27 PROCEDURE — 74011250636 HC RX REV CODE- 250/636: Performed by: INTERNAL MEDICINE

## 2018-03-27 PROCEDURE — 65270000029 HC RM PRIVATE

## 2018-03-27 PROCEDURE — 82962 GLUCOSE BLOOD TEST: CPT

## 2018-03-27 PROCEDURE — 36415 COLL VENOUS BLD VENIPUNCTURE: CPT | Performed by: INTERNAL MEDICINE

## 2018-03-27 PROCEDURE — 80048 BASIC METABOLIC PNL TOTAL CA: CPT | Performed by: INTERNAL MEDICINE

## 2018-03-27 PROCEDURE — 74011250637 HC RX REV CODE- 250/637: Performed by: HOSPITALIST

## 2018-03-27 PROCEDURE — 85027 COMPLETE CBC AUTOMATED: CPT | Performed by: INTERNAL MEDICINE

## 2018-03-27 PROCEDURE — 74011000258 HC RX REV CODE- 258: Performed by: INTERNAL MEDICINE

## 2018-03-27 PROCEDURE — 74011636637 HC RX REV CODE- 636/637: Performed by: HOSPITALIST

## 2018-03-27 PROCEDURE — 90935 HEMODIALYSIS ONE EVALUATION: CPT

## 2018-03-27 RX ADMIN — APIXABAN 5 MG: 5 TABLET, FILM COATED ORAL at 10:03

## 2018-03-27 RX ADMIN — HYDROMORPHONE HYDROCHLORIDE 0.5 MG: 2 INJECTION INTRAMUSCULAR; INTRAVENOUS; SUBCUTANEOUS at 15:49

## 2018-03-27 RX ADMIN — GEMFIBROZIL 300 MG: 600 TABLET ORAL at 10:03

## 2018-03-27 RX ADMIN — CASTOR OIL AND BALSAM, PERU: 788; 87 OINTMENT TOPICAL at 10:04

## 2018-03-27 RX ADMIN — HYDROMORPHONE HYDROCHLORIDE 0.5 MG: 2 INJECTION INTRAMUSCULAR; INTRAVENOUS; SUBCUTANEOUS at 06:40

## 2018-03-27 RX ADMIN — BUDESONIDE 500 MCG: 0.5 INHALANT RESPIRATORY (INHALATION) at 08:41

## 2018-03-27 RX ADMIN — AMITRIPTYLINE HYDROCHLORIDE 25 MG: 10 TABLET, FILM COATED ORAL at 21:11

## 2018-03-27 RX ADMIN — AMLODIPINE BESYLATE 5 MG: 5 TABLET ORAL at 10:04

## 2018-03-27 RX ADMIN — HYDROMORPHONE HYDROCHLORIDE 0.5 MG: 2 INJECTION INTRAMUSCULAR; INTRAVENOUS; SUBCUTANEOUS at 10:00

## 2018-03-27 RX ADMIN — BUDESONIDE 500 MCG: 0.5 INHALANT RESPIRATORY (INHALATION) at 21:13

## 2018-03-27 RX ADMIN — PANTOPRAZOLE SODIUM 40 MG: 40 TABLET, DELAYED RELEASE ORAL at 06:40

## 2018-03-27 RX ADMIN — SODIUM CHLORIDE 1 G: 900 INJECTION, SOLUTION INTRAVENOUS at 19:24

## 2018-03-27 RX ADMIN — ARFORMOTEROL TARTRATE 15 MCG: 15 SOLUTION RESPIRATORY (INHALATION) at 21:13

## 2018-03-27 RX ADMIN — HYDROMORPHONE HYDROCHLORIDE 0.5 MG: 2 INJECTION INTRAMUSCULAR; INTRAVENOUS; SUBCUTANEOUS at 03:08

## 2018-03-27 RX ADMIN — HYDROXYCHLOROQUINE SULFATE 200 MG: 200 TABLET, FILM COATED ORAL at 10:04

## 2018-03-27 RX ADMIN — HYDROMORPHONE HYDROCHLORIDE 0.5 MG: 2 INJECTION INTRAMUSCULAR; INTRAVENOUS; SUBCUTANEOUS at 18:33

## 2018-03-27 RX ADMIN — CARVEDILOL 12.5 MG: 12.5 TABLET, FILM COATED ORAL at 16:50

## 2018-03-27 RX ADMIN — CASTOR OIL AND BALSAM, PERU: 788; 87 OINTMENT TOPICAL at 22:17

## 2018-03-27 RX ADMIN — HYDROXYCHLOROQUINE SULFATE 200 MG: 200 TABLET, FILM COATED ORAL at 18:31

## 2018-03-27 RX ADMIN — Medication 2500 MCG: at 10:03

## 2018-03-27 RX ADMIN — CARVEDILOL 12.5 MG: 12.5 TABLET, FILM COATED ORAL at 10:03

## 2018-03-27 RX ADMIN — Medication 10 ML: at 21:13

## 2018-03-27 RX ADMIN — HYDROMORPHONE HYDROCHLORIDE 0.5 MG: 2 INJECTION INTRAMUSCULAR; INTRAVENOUS; SUBCUTANEOUS at 12:38

## 2018-03-27 RX ADMIN — ERYTHROPOIETIN 14000 UNITS: 10000 INJECTION, SOLUTION INTRAVENOUS; SUBCUTANEOUS at 16:50

## 2018-03-27 RX ADMIN — ONDANSETRON 4 MG: 2 INJECTION INTRAMUSCULAR; INTRAVENOUS at 13:57

## 2018-03-27 RX ADMIN — HYDROMORPHONE HYDROCHLORIDE 0.5 MG: 2 INJECTION INTRAMUSCULAR; INTRAVENOUS; SUBCUTANEOUS at 22:17

## 2018-03-27 RX ADMIN — ARFORMOTEROL TARTRATE 15 MCG: 15 SOLUTION RESPIRATORY (INHALATION) at 08:41

## 2018-03-27 RX ADMIN — PREDNISONE 5 MG: 5 TABLET ORAL at 10:04

## 2018-03-27 RX ADMIN — APIXABAN 5 MG: 5 TABLET, FILM COATED ORAL at 18:31

## 2018-03-27 RX ADMIN — CASTOR OIL AND BALSAM, PERU: 788; 87 OINTMENT TOPICAL at 15:50

## 2018-03-27 RX ADMIN — Medication 10 ML: at 06:47

## 2018-03-27 NOTE — PROGRESS NOTES
General Surgery Daily Progress Note    Admit Date: 3/11/2018  Post-Operative Day: * No surgery found * from * No surgery found *     Subjective:     Last 24 hrs: Pt is getting dialyzed. She still c/o some LLQ pain; o/w doing well       Objective:     Blood pressure (!) 139/100, pulse (!) 102, temperature 98.1 °F (36.7 °C), resp. rate 18, height 5' 5\" (1.651 m), weight 148 lb 9.4 oz (67.4 kg), SpO2 95 %, unknown if currently breastfeeding. Temp (24hrs), Av.6 °F (37 °C), Min:98.1 °F (36.7 °C), Max:99.2 °F (37.3 °C)      _____________________  Physical Exam:     Alert and Oriented, x3, in no acute distress. Cardiovascular: RRR, no peripheral edema  Lungs:CTAB   Abdomen: soft, TTP LLQ, drain in R and w/ bloody drainage      Assessment:   Principal Problem:    Peritonitis ([address] (3/11/2018)            Plan:     Cont abx and drainage  Further plans per medicine  following    Data Review:    No results for input(s): WBC, HGB, HCT, PLT, HGBEXT, HCTEXT, PLTEXT in the last 72 hours. Recent Labs      18   0434   INR  1.3*     No results for input(s): AML, LPSE in the last 72 hours.         ______________________  Medications:    Current Facility-Administered Medications   Medication Dose Route Frequency    balsam peru-castor oil (VENELEX)  mg/gram ointment   Topical Q8H    amLODIPine (NORVASC) tablet 5 mg  5 mg Oral DAILY    carvedilol (COREG) tablet 12.5 mg  12.5 mg Oral BID WITH MEALS    dextrose 5% and 0.9% NaCl infusion  50 mL/hr IntraVENous CONTINUOUS    hydrALAZINE (APRESOLINE) 20 mg/mL injection 5 mg  5 mg IntraVENous Q6H PRN    cefepime (MAXIPIME) 1 g in 0.9% sodium chloride (MBP/ADV) 50 mL ADV  1 g IntraVENous Q24H    apixaban (ELIQUIS) tablet 5 mg  5 mg Oral BID    0.9% sodium chloride infusion 250 mL  250 mL IntraVENous PRN    HYDROmorphone (DILAUDID) injection 0.5 mg  0.5 mg IntraVENous Q3H PRN    glucose chewable tablet 16 g  4 Tab Oral PRN    dextrose (D50W) injection syrg 12.5-25 g 12.5-25 g IntraVENous PRN    glucagon (GLUCAGEN) injection 1 mg  1 mg IntraMUSCular PRN    epoetin pia (EPOGEN;PROCRIT) 14,000 Units  14,000 Units SubCUTAneous DIALYSIS TUE, THU & SAT    insulin lispro (HUMALOG) injection   SubCUTAneous AC&HS    predniSONE (DELTASONE) tablet 5 mg  5 mg Oral DAILY    pantoprazole (PROTONIX) tablet 40 mg  40 mg Oral ACB    cyanocobalamin (VITAMIN B12) tablet 2,500 mcg  2,500 mcg Oral DAILY    hydroxychloroquine (PLAQUENIL) tablet 200 mg  200 mg Oral BID    albuterol (PROVENTIL VENTOLIN) nebulizer solution 2.5 mg  2.5 mg Nebulization Q4H PRN    gemfibrozil (LOPID) tablet 300 mg  300 mg Oral DAILY    oxyCODONE IR (ROXICODONE) tablet 2.5-5 mg  2.5-5 mg Oral Q6H PRN    amitriptyline (ELAVIL) tablet 25 mg  25 mg Oral QHS    sodium chloride (NS) flush 5-10 mL  5-10 mL IntraVENous Q8H    sodium chloride (NS) flush 5-10 mL  5-10 mL IntraVENous PRN    ondansetron (ZOFRAN) injection 4 mg  4 mg IntraVENous Q4H PRN    arformoterol (BROVANA) neb solution 15 mcg  15 mcg Nebulization BID RT    And    budesonide (PULMICORT) 500 mcg/2 ml nebulizer suspension  500 mcg Nebulization BID RT       Mauricio Arthur NP  3/27/2018

## 2018-03-27 NOTE — PROGRESS NOTES
Problem: Falls - Risk of  Goal: *Absence of Falls  Document Alex Fall Risk and appropriate interventions in the flowsheet.    Outcome: Progressing Towards Goal  Fall Risk Interventions:  Mobility Interventions: Communicate number of staff needed for ambulation/transfer, OT consult for ADLs, Patient to call before getting OOB, PT Consult for mobility concerns, PT Consult for assist device competence, Utilize walker, cane, or other assitive device    Mentation Interventions: Adequate sleep, hydration, pain control    Medication Interventions: Patient to call before getting OOB    Elimination Interventions: Call light in reach, Patient to call for help with toileting needs

## 2018-03-27 NOTE — PROGRESS NOTES
Hospitalist Progress Note  Grey Philip MD  Answering service: [phone] from in house phone  Cell: [phone]         Date of Service:  3/27/2018  NAME:  Garrison Urena  :  1985  MRN:  [id number]  Admission Summary: This is a 27-year-old female with multiple medical problems including SLE, end-stage renal disease (on hemodialysis, TTS), pulmonary embolism, on Eliquis (), hyperlipidemia, hypertension, recent Candida peritonitis, chronic bilateral pain.  She comes over here because of abdominal pain since last 4 or 5 days.               Interval history / Subjective:     3/20:   Pt yet with abd pain but better, no n.v,  Had HD today,   - low albumin met with poor po intake. Has supplements. 3/21:  A bit more swollen , cont with lower abd pain, but eating ( little ) no new fever spikes. 3/22:  Less swollen post HD,  Yet with pain lower abd, yet eats poorly, abd exam benign, drain in     3/23:  Pt with cont pain, seems real , discussed case with RN;  Also discussed case with pt, the decision is to do another CT of abd/pelvis  to see if we can come upon some understanding of why she is experiencing such continued pain that is requiring routine dilaudid as hers is ,  It is noted that the oral oxy does not seem to work as well as the iv dilaudid , may consider asking palliative medicine to participate in this pain management     3/24:  CT from yesterday noting cont collection of fluid low and abd not changed since 3/17 cT. Will ask surg to see if they have options to help. Pt remains in pain.      3/25:  Radiology has reviewed films and US of abd and feel that the lt and rt crescent shaped fluid collection communicates well and no further intervention needed. ;plus no drain able abscess/fluid collection noted on US on left abdomen    3/26:  Not eating much, had good night, no new issues.            Assessment & Plan:    Acute peritonitis  -recent candida peritonitis  -CT abd 3/11 Large amount of free intraperitoneal fluid appears somewhat loculated  -Follow cultures, blood culture 3/11 unremarkable. Peritoneal fluid heavy E coli  -Antibiotics per ID  -Was on Vanc/Zosyn/Fluconazole. Now Vanc/FLuconazole discontinued. Continue Cefepime  -s/p paracentesis, follow studies. With drain in place, draining serosanguinous now  - Repeat CT on 3/17 shows improvement on loculated fluid. Repeat fluid culture and cell count ordered by nephro on 3/17. C/s from 3/18 positive for ecoli , continue cefepime   Off imuran now; US no drain able fluid lt abdomen  -The patient might need more drainage of abdominal fluid  -Appreciate discussion with ID     E coli bacteremia/Sepsis   that the patient was found to have E coli bacteremia at HD      Hypotension now with HTN  -Takes coreg 12.5 bid at home, now on 6.25,will increase as tolerated. -now resolved  -Was on stress steroids  -Now on regular oral steroids    Thrombocytopenia   -Monitor closely, improving  -Switched Zosyn to cefepime. Stable     Bilateral pedal edema  -Dopplers negative for DVT  - mild edema 3/20/2018      SLE  -continue home meds  -D/C'd Imuran, allopurinol by nephro with acute infection and thrombocytopenia.      History of PE  -on Eliquis since 2012  See note from previous hosptalist  -Appreciate discussion with PMD 3/13. The patient was seen by hematology in 2013 but seems was lost to follow up. I think at that time the plan was to stop anticoagulation if her SLE is stable. Spoke to Dr Guero Royal, he feels that the patient can come off Eliquis. His records do not suggest any recurrence of DVT/PE.  Spoke to Dr Dionne Nair, he has not seen the patient since >1 yr. If antiphospholipid antibodies negative then the patient can come off Eliquis  -10/17 V/Q high probability for PE  -Per oncology the patient should be continued to Eliquis indefinitely      ESRD  -on HD TTS     Anemia  -of chronic disease  -Transfused 2 units PRBC with HD 3/15    Severe hepatic steatosis  -monitor     HTN  -stable     Severe protein calorie malnutrition  -Consult nutrition  -nutritional supp     Appreciate Nephro, ID, surgery input.     Renal diet     Code status: FULL CODE  DVT prophylaxis: Eliquis  PTA: home     Plan: Follow ID, nephrology, surgery     Care Plan discussed with: Patient/Family  Disposition: Home w/Family          Hospital Problems  Date Reviewed: 3/11/2018          Codes Class Noted POA    * (Principal)Peritonitis ([facility] [place] [address] ICD-10-CM: K65.9  ICD-9-CM: 567.9  3/11/2018 Yes                Review of Systems:   Stable ROS Yet wit pain but management med working. No n/v but poor apetite , no cp no sob     Vital Signs:    Last 24hrs VS reviewed since prior progress note. Most recent are:  Visit Vitals    BP (!) 137/101    Pulse 100    Temp 98.3 °F (36.8 °C)    Resp 19    Ht 5' 5\" (1.651 m)    Wt 67.4 kg (148 lb 9.4 oz)    SpO2 96%    BMI 24.73 kg/m2         Intake/Output Summary (Last 24 hours) at 03/27/18 1700  Last data filed at 03/27/18 1557   Gross per 24 hour   Intake                0 ml   Output              120 ml   Net             -120 ml        Physical Examination:           Stable exam 3/27/2018 over past 24 hours   Constitutional:  No acute distress, cooperative, pleasant    ENT:  Oral mucous moist, oropharynx benign. Neck supple,    Resp:  CTA bilaterally. No wheezing/rhonchi/rales. No accessory muscle use   CV:  Regular rhythm, normal rate, no murmurs, gallops, rubs    GI:  Soft, non distended, mild lower abd tender. normoactive bowel sounds, no hepatosplenomegaly right abd drain in place    Musculoskeletal:  No edema, warm, 2+ pulses throughout    Neurologic:  Moves all extremities. AAOx3, CN II-XII reviewed     Psych:  Good insight, Not anxious nor agitated.   Skin:  Good turgor, no rashes or ulcers       Data Review:    Review and/or order of clinical lab test      Labs:     Recent Labs      03/27/18   1220   WBC  4.1   HGB  8.3*   HCT  27.3*   PLT  159     Recent Labs      03/27/18   1220   NA  139   K  4.1   CL  105   CO2  26   BUN  11   CREA  4.01*   GLU  87   CA  7.6*     No results for input(s): SGOT, GPT, ALT, AP, TBIL, TBILI, TP, ALB, GLOB, GGT, AML, LPSE in the last 72 hours. No lab exists for component: AMYP, HLPSE  Recent Labs      03/25/18   0434   INR  1.3*   PTP  13.5*   APTT  31.3      No results for input(s): FE, TIBC, PSAT, FERR in the last 72 hours. Lab Results   Component Value Date/Time    Folate 4.1 (L) 03/15/2018 10:32 AM      No results for input(s): PH, PCO2, PO2 in the last 72 hours. No results for input(s): CPK, CKNDX, TROIQ in the last 72 hours.     No lab exists for component: CPKMB  Lab Results   Component Value Date/Time    Cholesterol, total 117 09/25/2015 02:02 PM    HDL Cholesterol 31 (L) 09/25/2015 02:02 PM    LDL, calculated 57 09/25/2015 02:02 PM    Triglyceride 146 09/25/2015 02:02 PM    CHOL/HDL Ratio 7.7 (H) 05/31/2009 10:30 AM     Lab Results   Component Value Date/Time    Glucose (POC) 106 (H) 03/27/2018 04:18 PM    Glucose (POC) 99 03/27/2018 11:18 AM    Glucose (POC) 87 03/27/2018 07:50 AM    Glucose (POC) 73 03/27/2018 07:09 AM    Glucose (POC) 73 03/27/2018 06:39 AM     Lab Results   Component Value Date/Time    Color YELLOW/STRAW 08/12/2016 09:06 PM    Appearance CLEAR 08/12/2016 09:06 PM    Specific gravity 1.017 08/12/2016 09:06 PM    Specific gravity 1.010 07/11/2016 12:44 PM    pH (UA) 7.0 08/12/2016 09:06 PM    Protein 300 (A) 08/12/2016 09:06 PM    Glucose NEGATIVE  08/12/2016 09:06 PM    Ketone TRACE (A) 08/12/2016 09:06 PM    Bilirubin NEGATIVE  07/11/2016 12:44 PM    Urobilinogen 1.0 08/12/2016 09:06 PM    Nitrites NEGATIVE  08/12/2016 09:06 PM    Leukocyte Esterase NEGATIVE  08/12/2016 09:06 PM    Epithelial cells MODERATE (A) 08/12/2016 09:06 PM    Bacteria 1+ (A) 08/12/2016 09:06 PM    WBC 0-4 08/12/2016 09:06 PM    RBC 0-5 08/12/2016 09:06 PM         Medications Reviewed:     Current Facility-Administered Medications   Medication Dose Route Frequency    balsam peru-castor oil (VENELEX)  mg/gram ointment   Topical Q8H    amLODIPine (NORVASC) tablet 5 mg  5 mg Oral DAILY    carvedilol (COREG) tablet 12.5 mg  12.5 mg Oral BID WITH MEALS    dextrose 5% and 0.9% NaCl infusion  50 mL/hr IntraVENous CONTINUOUS    hydrALAZINE (APRESOLINE) 20 mg/mL injection 5 mg  5 mg IntraVENous Q6H PRN    cefepime (MAXIPIME) 1 g in 0.9% sodium chloride (MBP/ADV) 50 mL ADV  1 g IntraVENous Q24H    apixaban (ELIQUIS) tablet 5 mg  5 mg Oral BID    0.9% sodium chloride infusion 250 mL  250 mL IntraVENous PRN    HYDROmorphone (DILAUDID) injection 0.5 mg  0.5 mg IntraVENous Q3H PRN    glucose chewable tablet 16 g  4 Tab Oral PRN    dextrose (D50W) injection syrg 12.5-25 g  12.5-25 g IntraVENous PRN    glucagon (GLUCAGEN) injection 1 mg  1 mg IntraMUSCular PRN    epoetin pia (EPOGEN;PROCRIT) 14,000 Units  14,000 Units SubCUTAneous DIALYSIS TUE, THU & SAT    insulin lispro (HUMALOG) injection   SubCUTAneous AC&HS    predniSONE (DELTASONE) tablet 5 mg  5 mg Oral DAILY    pantoprazole (PROTONIX) tablet 40 mg  40 mg Oral ACB    cyanocobalamin (VITAMIN B12) tablet 2,500 mcg  2,500 mcg Oral DAILY    hydroxychloroquine (PLAQUENIL) tablet 200 mg  200 mg Oral BID    albuterol (PROVENTIL VENTOLIN) nebulizer solution 2.5 mg  2.5 mg Nebulization Q4H PRN    gemfibrozil (LOPID) tablet 300 mg  300 mg Oral DAILY    oxyCODONE IR (ROXICODONE) tablet 2.5-5 mg  2.5-5 mg Oral Q6H PRN    amitriptyline (ELAVIL) tablet 25 mg  25 mg Oral QHS    sodium chloride (NS) flush 5-10 mL  5-10 mL IntraVENous Q8H    sodium chloride (NS) flush 5-10 mL  5-10 mL IntraVENous PRN    ondansetron (ZOFRAN) injection 4 mg  4 mg IntraVENous Q4H PRN    arformoterol (BROVANA) neb solution 15 mcg  15 mcg Nebulization BID RT    And    budesonide (PULMICORT) 500 mcg/2 ml nebulizer suspension  500 mcg Nebulization BID RT     ______________________________________________________________________  EXPECTED LENGTH OF STAY: 4d 21h  ACTUAL LENGTH OF STAY:          Yamileth Narayan MD

## 2018-03-27 NOTE — DIALYSIS
[facility]  [phone]    Vitals   Pre   Post   Assessment   Pre   Post     Temp  Temp: 98.1 °F (36.7 °C) (03/27/18 0837)  98.0 LOC  A&Ox 3 and following commands A&Ox 3 and following commands   HR   106 99 Lungs   Clear to diminished in the bases Clear to diminished in the bases   B/P  133/97 120/64 Cardiac   Heart sounds auscultated, pulses palpable Heart sounds auscultated, pulses palpable    Resp   Resp Rate: 18 (03/27/18 0837) 18 Skin   Wound drain to abd Wound drain to abd   Pain level  8 2 Edema  Non-pitting to PETER Non-pitting to PETER   Orders:    Duration:   Start:   1215 End:   1515 Total:   3 hours   Dialyzer:   Revaclear   K Bath:   Dialysate K (mEq/L): 3 (03/24/18 1545)   Ca Bath:   Dialysate CA (mEq/L): 2.5 (03/24/18 1545)   Na/Bicarb:   Dialysate NA (mEq/L): 140/35 (03/24/18 1545)   Target Fluid Removal:   1.5 kg   Access     Type & Location:   PETER AVF. +thrill, bruit, flashes, aspiration, NS flushes   Labs     Obtained/Reviewed   Critical Results Called   Date when labs were drawn-  Hgb-    HGB   Date Value Ref Range Status   03/22/2018 8.5 (L) 11.5 - 16.0 g/dL Final     K-    Potassium   Date Value Ref Range Status   03/22/2018 4.0 3.5 - 5.1 mmol/L Final     Ca-   Calcium   Date Value Ref Range Status   03/22/2018 8.1 (L) 8.5 - 10.1 MG/DL Final     Bun-   BUN   Date Value Ref Range Status   03/22/2018 17 6 - 20 MG/DL Final     Creat-   Creatinine   Date Value Ref Range Status   03/22/2018 4.18 (H) 0.55 - 1.02 MG/DL Final        Medications/ Blood Products Given     Name   Dose   Route and Time     None ordered                Blood Volume Processed (BVP):    78.1 Net Fluid   Removed:  1.5 kg   Comments   Time Out Done: Gricel Price 4974  Primary Nurse Rpt Pre: Adriana Azar RN  Primary Nurse Rpt Post: Jettie Doe, RN  Pt Education: Access care  Care Plan: Continue tx as ordered  Tx Summary: Pt tolerated tx well.  At the end blood in circuit returned with 300ml of NS. Needles removed x2, pressure held until no bleeding noted. Dressing secured well. Bed in lowest position, call bell within reach.    Consent signed - Informed Consent Verified: Yes (03/24/18 0290)  [facility] Consent - SIgned  Hepatitis Status- Negative on 12/26/18, Immune on 1/11/18  Machine #- [id number]  Telemetry status- Not monitored   Pre-dialysis wt.- 66.0

## 2018-03-27 NOTE — WOUND CARE
Wound Care Note:     New consult placed by nurse request for spot on sacrum    Chart shows:  Admitted for peritonitis   Past Medical History:   Diagnosis Date    Anemia     secondary to lupus    Asthma     no inhaler use in past 2 to 3 years    Carditis     Chronic kidney disease     ESRD    Chronic pain     DDD (degenerative disc disease), lumbar     ESRD (end stage renal disease) ([facility] [address]     GERD (gastroesophageal reflux disease)     Heart failure ([facility] [address]     Hemodialysis patient ([facility] [address] 12/21/2017    [address]  Tuesday,  Thursday,  and Saturday.  Hypercholesterolemia     Hypertension     Intractable nausea and vomiting 10/21/2015    Long term (current) use of anticoagulants     Lupus     Lupus (systemic lupus erythematosus) (HCC)     Malignant hypertension with chronic kidney disease stage V ([facility] [address]     Peritoneal dialysis status ([facility] [address] 10/2015    x 2 years Stopped 12/2017 due to infection and removed.  Poor historian 01/17/2018    With medications    Thromboembolus [facility]) 2013    lungs    Transfusion history     Last Transfusion 12/21/2017  at [facility]     WBC = 6.3 on 3/22/18  Admitted from home    Assessment:   Patient is A&O x 4, communicative, continent with no assistance needed in repositioning. Bed: [facility]  Diet: Regular with nutritional supplements  Patient reports no pain    Bilateral heels and buttocks skin intact and without erythema. 1. Right sacrum has a small pink area that is very superficial measuring 0.7 cm x 0.4 cm, area is non-blanchable, etiology friction vs pressure, no drainage, radha-wound intact. Venelex ordered. Advised patient to lift buttocks up when repositioning. Spoke with Dr. Marcy Boswell, wound care orders obtained. Patient repositioned on right side      Recommendations:    Sacrum- Every 8 hours apply Venelex ointment. Moisturize dry skin with Aloe Seaton.     Skin Care & Pressure Prevention:  Minimize layers of linen/pads under patient to optimize support surface. Turn/reposition approximately every 2 hours and offload heels.   Manage incontinence / promote continence     Discussed above plan with patient & Mimi Feldman RN    Transition of Care: Plan to follow as needed while admitted to hospital.    JOSH Quiroz, RN, [facility], [facility].  office [phone]  pager [phone] or call  to page

## 2018-03-27 NOTE — PROCEDURES
[facility]  *** FINAL REPORT ***    Name: Arvid Leventhal  MRN: [id number]    Inpatient  : 1985  HIS Order #: [id number]  [address] [facility] Visit #: [id number]  Date: 27 Mar 2018    TYPE OF TEST: Peripheral Venous Testing    REASON FOR TEST  Pain in limb, Limb swelling    Right Arm:-  Deep venous thrombosis:           No  Superficial venous thrombosis:    No      INTERPRETATION/FINDINGS  PROCEDURE:  Color duplex ultrasound imaging of upper extremity veins. FINDINGS:       Right:  The internal jugular, subclavian, axillary, brachial,  basilic, and cephalic veins are patent and without evidence of  thrombus. Pulsatile flow is observed in the veins of the right arm due   to the presence of a graft, which appeared patent. View of upper arm   was limited due to graft wound dressings. Left:    The subclavian vein is patent and without evidence of  thrombus. Phasic/pulsatile flow is observed. This side was not  otherwise evaluated. IMPRESSION:  No evidence of right upper extremity vein thrombosis. ADDITIONAL COMMENTS    I have personally reviewed the data relevant to the interpretation of  this  study.     TECHNOLOGIST: ROSY Wong  Signed: 2018 06:35 PM    PHYSICIAN: Pamela Gómez MD  Signed: 2018 06:57 AM

## 2018-03-27 NOTE — PROGRESS NOTES
Physical Therapy  3/27/2018    Pt currently in dialysis. Will f/u w/ pt later this afternoon for PT as able and appropriate.      Nataliia Means, PTA

## 2018-03-27 NOTE — ROUTINE PROCESS
Bedside shift change report given to Darrel Nelson (oncoming nurse) by Thanh Hugo (offgoing nurse). Report included the following information SBAR, Kardex, ED Summary, Procedure Summary, Intake/Output, MAR, Accordion and Recent Results.

## 2018-03-27 NOTE — PROGRESS NOTES
Patient name: Adriano Larios  MRN: [id number]    Nephrology Progress note:    Assessment:  ESRD: on HD [facility] - Hd today. .    E. Coli Bacteremia (Hard copy placed in chart): source intra-abd source/ E. Coli peritonitis. IV zosyn. Repeat Bld Cx 3/11-> Neg. Recent hx of Candida peritonitis    HTN:    Hx of PE: on Eliquis    Lupus: Anemia 2 to ESRD/Lupus: Hgb below goal - on EPO    SHPT    Protein malnutrition           Subjective:  \"My side is a little better. \"  No pain at present. No N/V. No dyspnea.          Exam:  Visit Vitals    BP (!) 139/100 (BP 1 Location: Left arm, BP Patient Position: At rest)    Pulse (!) 102    Temp 98.1 °F (36.7 °C)    Resp 18    Ht 5' 5\" (1.651 m)    Wt 67.4 kg (148 lb 9.4 oz)    SpO2 95%    BMI 24.73 kg/m2     Wt Readings from Last 3 Encounters:   03/27/18 67.4 kg (148 lb 9.4 oz)   03/05/18 68 kg (150 lb)   01/29/18 69.4 kg (153 lb)       Intake/Output Summary (Last 24 hours) at 03/27/18 1217  Last data filed at 03/27/18 0753   Gross per 24 hour   Intake                0 ml   Output              150 ml   Net             -150 ml     No edema  NAD    Labs/Data:    Lab Results   Component Value Date/Time    WBC 6.3 03/22/2018 04:03 AM    Hemoglobin (POC) 7.8 (L) 01/19/2018 12:30 PM    HGB 8.5 (L) 03/22/2018 04:03 AM    Hematocrit (POC) 23 (L) 01/19/2018 12:30 PM    HCT 27.8 (L) 03/22/2018 04:03 AM    PLATELET 458 18/64/4964 04:03 AM    MCV 83.2 03/22/2018 04:03 AM       Lab Results   Component Value Date/Time    Sodium 138 03/22/2018 04:03 AM    Potassium 4.0 03/22/2018 04:03 AM    Chloride 103 03/22/2018 04:03 AM    CO2 28 03/22/2018 04:03 AM    Anion gap 7 03/22/2018 04:03 AM    Glucose 78 03/22/2018 04:03 AM    BUN 17 03/22/2018 04:03 AM    Creatinine 4.18 (H) 03/22/2018 04:03 AM    BUN/Creatinine ratio 4 (L) 03/22/2018 04:03 AM    GFR est AA 15 (L) 03/22/2018 04:03 AM    GFR est non-AA 12 (L) 03/22/2018 04:03 AM    Calcium 8.1 (L) 03/22/2018 04:03 AM

## 2018-03-28 LAB
APPEARANCE FLD: ABNORMAL
COLOR FLD: ABNORMAL
GLUCOSE BLD STRIP.AUTO-MCNC: 107 MG/DL (ref 65–100)
GLUCOSE BLD STRIP.AUTO-MCNC: 67 MG/DL (ref 65–100)
GLUCOSE BLD STRIP.AUTO-MCNC: 75 MG/DL (ref 65–100)
GLUCOSE BLD STRIP.AUTO-MCNC: 80 MG/DL (ref 65–100)
GLUCOSE BLD STRIP.AUTO-MCNC: 80 MG/DL (ref 65–100)
GLUCOSE BLD STRIP.AUTO-MCNC: 89 MG/DL (ref 65–100)
LYMPHOCYTES NFR FLD: 2 %
MONOS+MACROS NFR FLD: 2 %
NEUTROPHILS NFR FLD: 96 %
NUC CELL # FLD: ABNORMAL /CU MM
RBC # FLD: >100 /CU MM
SERVICE CMNT-IMP: ABNORMAL
SERVICE CMNT-IMP: NORMAL
SPECIMEN SOURCE FLD: ABNORMAL

## 2018-03-28 PROCEDURE — 74011250637 HC RX REV CODE- 250/637: Performed by: INTERNAL MEDICINE

## 2018-03-28 PROCEDURE — 65270000029 HC RM PRIVATE

## 2018-03-28 PROCEDURE — 74011000250 HC RX REV CODE- 250: Performed by: HOSPITALIST

## 2018-03-28 PROCEDURE — 87070 CULTURE OTHR SPECIMN AEROBIC: CPT | Performed by: INTERNAL MEDICINE

## 2018-03-28 PROCEDURE — 89050 BODY FLUID CELL COUNT: CPT | Performed by: INTERNAL MEDICINE

## 2018-03-28 PROCEDURE — 82962 GLUCOSE BLOOD TEST: CPT

## 2018-03-28 PROCEDURE — 74011636637 HC RX REV CODE- 636/637: Performed by: HOSPITALIST

## 2018-03-28 PROCEDURE — 74011250636 HC RX REV CODE- 250/636: Performed by: HOSPITALIST

## 2018-03-28 PROCEDURE — 97530 THERAPEUTIC ACTIVITIES: CPT

## 2018-03-28 PROCEDURE — 74011250636 HC RX REV CODE- 250/636: Performed by: INTERNAL MEDICINE

## 2018-03-28 PROCEDURE — 74011250637 HC RX REV CODE- 250/637: Performed by: NURSE PRACTITIONER

## 2018-03-28 PROCEDURE — 94640 AIRWAY INHALATION TREATMENT: CPT

## 2018-03-28 PROCEDURE — 74011000258 HC RX REV CODE- 258: Performed by: INTERNAL MEDICINE

## 2018-03-28 PROCEDURE — 74011250637 HC RX REV CODE- 250/637: Performed by: HOSPITALIST

## 2018-03-28 RX ORDER — SENNOSIDES 8.6 MG/1
2 TABLET ORAL
Status: DISCONTINUED | OUTPATIENT
Start: 2018-03-28 | End: 2018-04-14

## 2018-03-28 RX ADMIN — CASTOR OIL AND BALSAM, PERU: 788; 87 OINTMENT TOPICAL at 21:05

## 2018-03-28 RX ADMIN — Medication 10 ML: at 13:01

## 2018-03-28 RX ADMIN — HYDROMORPHONE HYDROCHLORIDE 0.5 MG: 2 INJECTION INTRAMUSCULAR; INTRAVENOUS; SUBCUTANEOUS at 10:11

## 2018-03-28 RX ADMIN — CASTOR OIL AND BALSAM, PERU: 788; 87 OINTMENT TOPICAL at 13:01

## 2018-03-28 RX ADMIN — AMITRIPTYLINE HYDROCHLORIDE 25 MG: 10 TABLET, FILM COATED ORAL at 21:04

## 2018-03-28 RX ADMIN — HYDROMORPHONE HYDROCHLORIDE 0.5 MG: 2 INJECTION INTRAMUSCULAR; INTRAVENOUS; SUBCUTANEOUS at 04:15

## 2018-03-28 RX ADMIN — CASTOR OIL AND BALSAM, PERU: 788; 87 OINTMENT TOPICAL at 06:46

## 2018-03-28 RX ADMIN — PREDNISONE 5 MG: 5 TABLET ORAL at 09:17

## 2018-03-28 RX ADMIN — SENNOSIDES 17.2 MG: 8.6 TABLET, FILM COATED ORAL at 21:03

## 2018-03-28 RX ADMIN — Medication 10 ML: at 01:21

## 2018-03-28 RX ADMIN — CARVEDILOL 12.5 MG: 12.5 TABLET, FILM COATED ORAL at 09:18

## 2018-03-28 RX ADMIN — HYDROMORPHONE HYDROCHLORIDE 0.5 MG: 2 INJECTION INTRAMUSCULAR; INTRAVENOUS; SUBCUTANEOUS at 22:14

## 2018-03-28 RX ADMIN — CARVEDILOL 12.5 MG: 12.5 TABLET, FILM COATED ORAL at 18:15

## 2018-03-28 RX ADMIN — BUDESONIDE 500 MCG: 0.5 INHALANT RESPIRATORY (INHALATION) at 08:20

## 2018-03-28 RX ADMIN — HYDROMORPHONE HYDROCHLORIDE 0.5 MG: 2 INJECTION INTRAMUSCULAR; INTRAVENOUS; SUBCUTANEOUS at 13:00

## 2018-03-28 RX ADMIN — HYDROMORPHONE HYDROCHLORIDE 0.5 MG: 2 INJECTION INTRAMUSCULAR; INTRAVENOUS; SUBCUTANEOUS at 07:12

## 2018-03-28 RX ADMIN — Medication 10 ML: at 21:06

## 2018-03-28 RX ADMIN — BUDESONIDE 500 MCG: 0.5 INHALANT RESPIRATORY (INHALATION) at 20:32

## 2018-03-28 RX ADMIN — HYDROXYCHLOROQUINE SULFATE 200 MG: 200 TABLET, FILM COATED ORAL at 09:18

## 2018-03-28 RX ADMIN — HYDROXYCHLOROQUINE SULFATE 200 MG: 200 TABLET, FILM COATED ORAL at 18:15

## 2018-03-28 RX ADMIN — HYDROMORPHONE HYDROCHLORIDE 0.5 MG: 2 INJECTION INTRAMUSCULAR; INTRAVENOUS; SUBCUTANEOUS at 19:04

## 2018-03-28 RX ADMIN — APIXABAN 5 MG: 5 TABLET, FILM COATED ORAL at 18:15

## 2018-03-28 RX ADMIN — GEMFIBROZIL 300 MG: 600 TABLET ORAL at 09:18

## 2018-03-28 RX ADMIN — Medication 2500 MCG: at 09:17

## 2018-03-28 RX ADMIN — Medication 10 ML: at 06:46

## 2018-03-28 RX ADMIN — ARFORMOTEROL TARTRATE 15 MCG: 15 SOLUTION RESPIRATORY (INHALATION) at 08:20

## 2018-03-28 RX ADMIN — PANTOPRAZOLE SODIUM 40 MG: 40 TABLET, DELAYED RELEASE ORAL at 06:45

## 2018-03-28 RX ADMIN — HYDROMORPHONE HYDROCHLORIDE 0.5 MG: 2 INJECTION INTRAMUSCULAR; INTRAVENOUS; SUBCUTANEOUS at 16:31

## 2018-03-28 RX ADMIN — APIXABAN 5 MG: 5 TABLET, FILM COATED ORAL at 09:17

## 2018-03-28 RX ADMIN — AMLODIPINE BESYLATE 5 MG: 5 TABLET ORAL at 09:18

## 2018-03-28 RX ADMIN — SODIUM CHLORIDE 1 G: 900 INJECTION, SOLUTION INTRAVENOUS at 19:06

## 2018-03-28 RX ADMIN — ARFORMOTEROL TARTRATE 15 MCG: 15 SOLUTION RESPIRATORY (INHALATION) at 20:32

## 2018-03-28 RX ADMIN — HYDROMORPHONE HYDROCHLORIDE 0.5 MG: 2 INJECTION INTRAMUSCULAR; INTRAVENOUS; SUBCUTANEOUS at 01:20

## 2018-03-28 NOTE — PROGRESS NOTES
Problem: Mobility Impaired (Adult and Pediatric)  Goal: *Acute Goals and Plan of Care (Insert Text)  Physical Therapy Goals  Initiated 3/16/2018  1. Patient will move from supine to sit and sit to supine  in bed with supervision/set-up within 7 day(s). 2.  Patient will transfer from bed to chair and chair to bed with supervision/set-up using the least restrictive device within 7 day(s). 3.  Patient will perform sit to stand with minimal assistance/contact guard assist within 7 day(s). 4.  Patient will ambulate with minimal assistance/contact guard assist for 50 feet with the least restrictive device within 7 day(s). physical Therapy TREATMENT  Patient: Rodrick Quintana (30 y.o. female)  Date: 3/28/2018  Diagnosis: Peritonitis ([address] Peritonitis ([address]       Precautions: Fall (abd drain)  Chart, physical therapy assessment, plan of care and goals were reviewed. ASSESSMENT:  Pt continues to have difficulty w/ completing sit<>stand transfers as pt able to minimally clear buttocks from bed despite bed elevation. Bed mobility to EOB Mod I and SBA for scooting towards HOB. Pt reports she was up to chair yesterday w/ mother's assist using a squat pivot transfer based off description pt provided. Pt returned to bed via log roll and completed supine exercises w/ manual facilitation at pt hips (boogie) w/ bridging. Discussed option for rehab prior to d/c home to increase pt strength for transfers and overall independence as pt reported she was Independent PTA. Encouraged pt to continue w/ exercises at least 3x/day (10 reps ea). Pt remained supine in bed, HOB elevated (30*) w/ all needs met, items in reach.    Progression toward goals:  []    Improving appropriately and progressing toward goals  [x]    Improving slowly and progressing toward goals  []    Not making progress toward goals and plan of care will be adjusted     PLAN:  Patient continues to benefit from skilled intervention to address the above impairments. Continue treatment per established plan of care. Discharge Recommendations:  Rehab vs Home Health  Further Equipment Recommendations for Discharge:  TBD     SUBJECTIVE:   Patient stated I can get around okay once I get up and moving.     OBJECTIVE DATA SUMMARY:   Critical Behavior:  Neurologic State: Alert  Orientation Level: Oriented X4  Cognition: Appropriate safety awareness  Safety/Judgement: Awareness of environment  Functional Mobility Training:  Bed Mobility:  Rolling: Contact guard assistance (into bed,LE's)  Supine to Sit: Modified independent  Sit to Supine:  (refer to rolling)           Transfers:  Sit to Stand: Moderate assistance;Maximum assistance; Additional time;Assist x1 (repeated trials/attempts)                                Balance:  Sitting: Intact; With support (UE's)  Standing: Impaired (pt unable to stand from various bed heights)  Ambulation/Gait Training:        Ambulation - Level of Assistance:  (pt unable to stand)                                                          Neuro Re-Education:  Facilitation at hips when bridging pushing through boogie LE's  Therapeutic Exercises:   Hip flexion, LAQ's, glut sets, heel press, heel slides (supine, x10 each)    Pain:  Pain Scale 1: Numeric (0 - 10)  Pain Intensity 1: 7  Pain Location 1: Abdomen  Pain Orientation 1: Left  Pain Description 1: Aching  Pain Intervention(s) 1: Medication (see MAR)  Activity Tolerance: Tolerated bed mobility and exercises fairly. 7/10 pain reported w/ activity. Please refer to the flowsheet for vital signs taken during this treatment.   After treatment:   []    Patient left in no apparent distress sitting up in chair  [x]    Patient left in no apparent distress in bed  [x]    Call bell left within reach  [x]    Nursing notified  []    Caregiver present  []    Bed alarm activated    COMMUNICATION/COLLABORATION:   The patients plan of care was discussed with: Occupational Therapist and Registered Nurse Hines Factor A Means,PTA   Time Calculation: 25 mins

## 2018-03-28 NOTE — ROUTINE PROCESS
Bedside and Verbal shift change report given to Katherin (oncoming nurse) by Mimi Feldman (offgoing nurse). Report included the following information SBAR, Kardex, ED Summary, Procedure Summary, Intake/Output, MAR and Accordion.

## 2018-03-28 NOTE — PROGRESS NOTES
Daily Progress Note  [facility] General Surgery at [address] Date: 3/11/2018    Subjective:     Last 24 hrs:  States she is feeling a bit better today. Did well with dinner last night, but did not eat much this morning due to abdominal pain. Last BM was 3 days ago. Afebrile, continues on cefepime. Objective:     Blood pressure (!) 137/100, pulse (!) 101, temperature 98.5 °F (36.9 °C), resp. rate 20, height 5' 5\" (1.651 m), weight 65.1 kg (143 lb 9.6 oz), SpO2 97 %, unknown if currently breastfeeding. Temp (24hrs), Av.3 °F (36.8 °C), Min:98.1 °F (36.7 °C), Max:98.5 °F (36.9 °C)      _____________________  Physical Exam:     Sleeping, awakens easily. No distress. Cardiovascular: RRR, no peripheral edema  Lungs:CTAB   Abdomen: soft, mild tenderness in lower midline. Pigtail catheter in RLQ to gravity drainage. 160 mL out yesterday, ss fluid. Assessment:   Principal Problem:    Peritonitis ([address] (3/11/2018)            Plan:     Monitor drain output  Abx per ID  Add bowel regimen  following        Judi Tapia, ACNP - [facility] Surgery at [address], [address] [place] [address]  [phone]    Data Review:    Recent Labs      18   1220   WBC  4.1   HGB  8.3*   HCT  27.3*   PLT  159     Recent Labs      18   1220   NA  139   K  4.1   CL  105   CO2  26   GLU  87   BUN  11   CREA  4.01*   CA  7.6*     No results for input(s): AML, LPSE in the last 72 hours.         ______________________  Medications:    Current Facility-Administered Medications   Medication Dose Route Frequency    balsam peru-castor oil (VENELEX)  mg/gram ointment   Topical Q8H    amLODIPine (NORVASC) tablet 5 mg  5 mg Oral DAILY    carvedilol (COREG) tablet 12.5 mg  12.5 mg Oral BID WITH MEALS    dextrose 5% and 0.9% NaCl infusion  50 mL/hr IntraVENous CONTINUOUS    hydrALAZINE (APRESOLINE) 20 mg/mL injection 5 mg  5 mg IntraVENous Q6H PRN    cefepime (MAXIPIME) 1 g in 0.9% sodium chloride (MBP/ADV) 50 mL ADV  1 g IntraVENous Q24H    apixaban (ELIQUIS) tablet 5 mg  5 mg Oral BID    0.9% sodium chloride infusion 250 mL  250 mL IntraVENous PRN    HYDROmorphone (DILAUDID) injection 0.5 mg  0.5 mg IntraVENous Q3H PRN    glucose chewable tablet 16 g  4 Tab Oral PRN    dextrose (D50W) injection syrg 12.5-25 g  12.5-25 g IntraVENous PRN    glucagon (GLUCAGEN) injection 1 mg  1 mg IntraMUSCular PRN    epoetin pia (EPOGEN;PROCRIT) 14,000 Units  14,000 Units SubCUTAneous DIALYSIS TUE, THU & SAT    insulin lispro (HUMALOG) injection   SubCUTAneous AC&HS    predniSONE (DELTASONE) tablet 5 mg  5 mg Oral DAILY    pantoprazole (PROTONIX) tablet 40 mg  40 mg Oral ACB    cyanocobalamin (VITAMIN B12) tablet 2,500 mcg  2,500 mcg Oral DAILY    hydroxychloroquine (PLAQUENIL) tablet 200 mg  200 mg Oral BID    albuterol (PROVENTIL VENTOLIN) nebulizer solution 2.5 mg  2.5 mg Nebulization Q4H PRN    gemfibrozil (LOPID) tablet 300 mg  300 mg Oral DAILY    oxyCODONE IR (ROXICODONE) tablet 2.5-5 mg  2.5-5 mg Oral Q6H PRN    amitriptyline (ELAVIL) tablet 25 mg  25 mg Oral QHS    sodium chloride (NS) flush 5-10 mL  5-10 mL IntraVENous Q8H    sodium chloride (NS) flush 5-10 mL  5-10 mL IntraVENous PRN    ondansetron (ZOFRAN) injection 4 mg  4 mg IntraVENous Q4H PRN    arformoterol (BROVANA) neb solution 15 mcg  15 mcg Nebulization BID RT    And    budesonide (PULMICORT) 500 mcg/2 ml nebulizer suspension  500 mcg Nebulization BID RT

## 2018-03-28 NOTE — DIABETES MGMT
DTC Progress Note    Recommendations/ Comments: Chart reviewed d/t hypoglycemic events. Pt has not required any correctional insulin. Noted D5 ordered, but pt has not received any the past 2 days. If appropriate, please consider restarting D5    Current hospital DM medication: correctional Lispro-normal sensitivity     Chart reviewed on [place]. Patient is a 29 y.o. female with no history of diabetes. A1c:   Lab Results   Component Value Date/Time    Hemoglobin A1c 5.4 09/25/2015 02:02 PM    Hemoglobin A1c 5.3 03/07/2014 02:16 PM       Recent Glucose Results:   Lab Results   Component Value Date/Time    GLUCPOC 80 03/28/2018 11:42 AM    GLUCPOC 75 03/28/2018 11:23 AM    GLUCPOC 89 03/28/2018 06:58 AM        Lab Results   Component Value Date/Time    Creatinine 4.01 (H) 03/27/2018 12:20 PM     Estimated Creatinine Clearance: 18.1 mL/min (based on Cr of 4.01). Active Orders   Diet    DIET REGULAR        PO intake: No data found. Will continue to follow as needed. Thank you  Belem Archibald RD, CDE

## 2018-03-28 NOTE — PROGRESS NOTES
Patient name: Pradeep Thompson  MRN: [id number]    Nephrology Progress note:    Assessment:  ESRD: on HD [facility] - No acute need for HD today. Plan HD in AM..    E. Coli Bacteremia (Hard copy placed in chart): source intra-abd source/ E. Coli peritonitis. IV zosyn. Repeat Bld Cx 3/11-> Neg. Recent hx of Candida peritonitis    HTN:    Hx of PE: on Eliquis    Lupus: Anemia 2 to ESRD/Lupus: Hgb below goal - on EPO    SHPT    Protein malnutrition           Subjective:  \"I feel better. \"  No pain at present. No N/V. No dyspnea.          Exam:  Visit Vitals    BP (!) 137/100    Pulse (!) 101    Temp 98.5 °F (36.9 °C)    Resp 20    Ht 5' 5\" (1.651 m)    Wt 65.1 kg (143 lb 9.6 oz)    SpO2 97%    BMI 23.9 kg/m2     Wt Readings from Last 3 Encounters:   03/28/18 65.1 kg (143 lb 9.6 oz)   03/05/18 68 kg (150 lb)   01/29/18 69.4 kg (153 lb)       Intake/Output Summary (Last 24 hours) at 03/28/18 1117  Last data filed at 03/28/18 0659   Gross per 24 hour   Intake                0 ml   Output              100 ml   Net             -100 ml     No edema  NAD    Labs/Data:    Lab Results   Component Value Date/Time    WBC 4.1 03/27/2018 12:20 PM    Hemoglobin (POC) 7.8 (L) 01/19/2018 12:30 PM    HGB 8.3 (L) 03/27/2018 12:20 PM    Hematocrit (POC) 23 (L) 01/19/2018 12:30 PM    HCT 27.3 (L) 03/27/2018 12:20 PM    PLATELET 409 40/01/9399 12:20 PM    MCV 83.2 03/27/2018 12:20 PM       Lab Results   Component Value Date/Time    Sodium 139 03/27/2018 12:20 PM    Potassium 4.1 03/27/2018 12:20 PM    Chloride 105 03/27/2018 12:20 PM    CO2 26 03/27/2018 12:20 PM    Anion gap 8 03/27/2018 12:20 PM    Glucose 87 03/27/2018 12:20 PM    BUN 11 03/27/2018 12:20 PM    Creatinine 4.01 (H) 03/27/2018 12:20 PM    BUN/Creatinine ratio 3 (L) 03/27/2018 12:20 PM    GFR est AA 16 (L) 03/27/2018 12:20 PM    GFR est non-AA 13 (L) 03/27/2018 12:20 PM    Calcium 7.6 (L) 03/27/2018 12:20 PM

## 2018-03-28 NOTE — PROGRESS NOTES
Mya Benoit  is a 29 y. o.female  with a history of SLE, lupus nephritis leading to ESRD //HTN/ treated for candida peritonitis in Dec 2017  Is admitted with abdominal pain and fever since last Thursday  Pt was in [address] [place] [address] in Dec 2017 for candida peritonitis and PD was stopped and she was started on HD thru a catheter. She took 4 weeks of fluconazole and was doing fine She had a graft placed on the rt arm on 1/19/19 - She developed fever and abdominal pain on 3/6 and blood culture sent from the catheter at dialysis center- The catheter was removed on 3/7 and the graft was accessed last week-   She was given IV gent  at the dialysis center.  As the abdominal pain was getting worse she came to the ED on 3/11 and was admitted  CT abdomen showed Large amount of free intraperitoneal fluid that is  loculated.         Multiple hospitalizations in the past few months  OCt 14-17 /2017 for left lower lobe pneumonia/effusion- was found to have PE on the rt side  10/27-10/30 possible pneumonia- same infiltrate left lower lobe- sent on augmentin     11/26-/11/28 for b/l leg swelling and abdominal swelling     12/12/17-12/22/17- fungal peritonitis- treated with fluconazole for 4 weeks     She had  peritoneal dialysis since 2015- had cath placed in 2015 until it was removed in Dec 2017      subjective  C/o swelling rt arm, rt breast and rt leg  Objective:   VITALS:    Patient Vitals for the past 12 hrs:   Temp Pulse Resp BP SpO2   03/28/18 1308 98.4 °F (36.9 °C) 95 18 (!) 128/92 95 %   03/28/18 0827 98.5 °F (36.9 °C) (!) 101 20 (!) 137/100 97 %   03/28/18 0822 - - - - 98 %   03/28/18 0413 98.3 °F (36.8 °C) 98 16 (!) 134/94 99 %   PHYSICAL EXAM:   General:                   no distress  Lungs:                       B/l air entry     Heart:                                  s1s2  Abdomen:                  Drain -purulent reddish fluid  Tender lower abdomen  Extremities:               Rt arm graft   Arm swollen  Rt breast edematous  Skin:                                    Dry skin- striae over arms/abdomen  Lymph:                      Cervical, supraclavicular normal.  Neurologic:                Grossly non-focal     Pertinent Labs  Wbc 2.5>6.4   Hb 8.4  PLT 91  Lactate 1.5  Albumin 1.5  Peritoneal fluid- 3524 WBC ( 85% N)  E.coli     IMAGING RESULTS:             Impression/Recommendation    33 yr female with SLE/ lupus nephritis/HTN -h/o peritoneal dialysis until Dec 2017 /Candida peritonitis in Dec 2017/ treated with 4 weeks of fluconazole     Admitted with abdominal pain and fever of  5 days     E.coli  peritonitis with e.coli bacteremia  Loculated peritoneal fluid- has a drain- repeat imaging from 3/17 still shows a collection but significantly reduced- She has pockets of fluid and will have to make sure that it is all completely drained - RPT CT scan-shows  a crescentic fluid collection in the anterior peritoneum which extends from left to right - significantly reduced from the 1st CT on 3/11 but same as the CT from 3/17- this fluid will have to be completely drained or else risk for recurrence- still draining 100-160 cc ./day  IR will not be able to place anther drain in the gelatinous fluid- Surgery to opine whether she need lap evacuation  Send ascitic fluid for culture from the drain to look for clearance of e.coli   2 grams of Cefepime can be given at the end of dialysis X 2 weeks and nephrology can monitor her for re accumulation of fluid    Edema rt arm. Rt breast - Doppler no  thrombosis of the graft/vein rt arm ?  Need for CT    SLE- on plaquenil and low dose prednisone- Imuran on hold  Discussed with patient and Maricarmen Llamas and her nurse Mayito Branch

## 2018-03-28 NOTE — PROGRESS NOTES
Hospitalist Progress Note  Sindy Blakely MD  Answering service: [phone] from in house phone  Cell: [phone]         Date of Service:  3/28/2018  NAME:  Edgar Ahuja  :  1985  MRN:  [id number]  Admission Summary: This is a 43-year-old female with multiple medical problems including SLE, end-stage renal disease (on hemodialysis, TTS), pulmonary embolism, on Eliquis (), hyperlipidemia, hypertension, recent Candida peritonitis, chronic bilateral pain.  She comes over here because of abdominal pain since last 4 or 5 days.               Interval history / Subjective:     3/20:   Pt yet with abd pain but better, no n.v,  Had HD today,   - low albumin met with poor po intake. Has supplements. 3/21:  A bit more swollen , cont with lower abd pain, but eating ( little ) no new fever spikes. 3/22:  Less swollen post HD,  Yet with pain lower abd, yet eats poorly, abd exam benign, drain in     3/23:  Pt with cont pain, seems real , discussed case with RN;  Also discussed case with pt, the decision is to do another CT of abd/pelvis  to see if we can come upon some understanding of why she is experiencing such continued pain that is requiring routine dilaudid as hers is ,  It is noted that the oral oxy does not seem to work as well as the iv dilaudid , may consider asking palliative medicine to participate in this pain management     3/24:  CT from yesterday noting cont collection of fluid low and abd not changed since 3/17 cT. Will ask surg to see if they have options to help. Pt remains in pain.      3/25:  Radiology has reviewed films and US of abd and feel that the lt and rt crescent shaped fluid collection communicates well and no further intervention needed. ;plus no drain able abscess/fluid collection noted on US on left abdomen    3/26:  Not eating much, had good night, no new issues.            Assessment & Plan:    Acute peritonitis  -recent candida peritonitis  -CT abd 3/11 Large amount of free intraperitoneal fluid appears somewhat loculated  -Follow cultures, blood culture 3/11 unremarkable. Peritoneal fluid heavy E coli  -Antibiotics per ID  -Was on Vanc/Zosyn/Fluconazole. Now Vanc/FLuconazole discontinued. Continue Cefepime  -s/p paracentesis  - Repeat CT on 3/17 shows improvement on loculated fluid. Repeat fluid culture and cell count ordered by nephro on 3/17. C/s from 3/18 positive for ecoli , continue cefepime   Off imuran now  -Repeat CT abd 3/23 The crescentic peritoneal collection extending from right to left in the anterior abdomen is again noted. It is relatively unchanged in size and extent compared to the previous study. The drainage end of the pigtail catheter remains in the right side of the collection. Trace ascites is noted  -US 3/25 no drain able fluid lt abdomen  -The patient might need more drainage of abdominal fluid, not sure if the catheter would be able to drain all of it, now. ?Surgery vs prolonged antibiotics  -Appreciate discussion with ID     E coli bacteremia/Sepsis   that the patient was found to have E coli bacteremia at HD      Hypotension now with HTN  -Takes coreg 12.5 bid at home, now on 6.25,will increase as tolerated. -now resolved  -Was on stress steroids  -Now on regular oral steroids    Thrombocytopenia   -Monitor closely, improving  -Switched Zosyn to cefepime. Stable     Bilateral pedal edema  -Dopplers negative for DVT  - mild edema 3/20/2018      SLE  -continue home meds  -D/C'd Imuran, allopurinol by nephro with acute infection and thrombocytopenia.      History of PE  -on Eliquis since 2012  See note from previous hosptalist  -Appreciate discussion with PMD 3/13. The patient was seen by hematology in 2013 but seems was lost to follow up. I think at that time the plan was to stop anticoagulation if her SLE is stable.  Spoke to Dr Kingsley Nice, he feels that the patient can come off Eliquis. His records do not suggest any recurrence of DVT/PE. Spoke to Dr Yi Randolph, he has not seen the patient since >1 yr. If antiphospholipid antibodies negative then the patient can come off Eliquis  -10/17 V/Q high probability for PE  -Per oncology the patient should be continued to Eliquis indefinitely      ESRD  -on HD TTS     Anemia  -of chronic disease  -Transfused 2 units PRBC with HD 3/15    Severe hepatic steatosis  -monitor     HTN  -stable     Severe protein calorie malnutrition  -Consult nutrition  -nutritional supp     Appreciate Nephro, ID, surgery input.     Renal diet     Code status: FULL CODE  DVT prophylaxis: Eliquis  PTA: home     Plan: Follow ID, nephrology, surgery     Care Plan discussed with: Patient/Family  Disposition: Home w/Family          Hospital Problems  Date Reviewed: 3/11/2018          Codes Class Noted POA    * (Principal)Peritonitis ([facility] [address] ICD-10-CM: K65.9  ICD-9-CM: 567.9  3/11/2018 Yes                Review of Systems:   Stable ROS Yet wit pain but management med working. No n/v but poor apetite , no cp no sob     Vital Signs:    Last 24hrs VS reviewed since prior progress note. Most recent are:  Visit Vitals    BP (!) 137/100    Pulse (!) 101    Temp 98.5 °F (36.9 °C)    Resp 20    Ht 5' 5\" (1.651 m)    Wt 65.1 kg (143 lb 9.6 oz)    SpO2 97%    BMI 23.9 kg/m2         Intake/Output Summary (Last 24 hours) at 03/28/18 1033  Last data filed at 03/28/18 5265   Gross per 24 hour   Intake                0 ml   Output              100 ml   Net             -100 ml        Physical Examination:             Constitutional:  No acute distress, cooperative, pleasant    ENT:  Oral mucous moist, oropharynx benign. Neck supple,    Resp:  CTA bilaterally. No wheezing/rhonchi/rales. No accessory muscle use   CV:  Regular rhythm, normal rate, no murmurs, gallops, rubs    GI:  Soft, non distended, mild lower abd tender.  normoactive bowel sounds, no hepatosplenomegaly right abd drain in place    Musculoskeletal:  No edema, warm, 2+ pulses throughout    Neurologic:  Moves all extremities. AAOx3, CN II-XII reviewed     Psych:  Good insight, Not anxious nor agitated. Skin:  Good turgor, no rashes or ulcers       Data Review:    Review and/or order of clinical lab test      Labs:     Recent Labs      03/27/18   1220   WBC  4.1   HGB  8.3*   HCT  27.3*   PLT  159     Recent Labs      03/27/18   1220   NA  139   K  4.1   CL  105   CO2  26   BUN  11   CREA  4.01*   GLU  87   CA  7.6*     No results for input(s): SGOT, GPT, ALT, AP, TBIL, TBILI, TP, ALB, GLOB, GGT, AML, LPSE in the last 72 hours. No lab exists for component: AMYP, HLPSE  No results for input(s): INR, PTP, APTT in the last 72 hours. No lab exists for component: INREXT, INREXT   No results for input(s): FE, TIBC, PSAT, FERR in the last 72 hours. Lab Results   Component Value Date/Time    Folate 4.1 (L) 03/15/2018 10:32 AM      No results for input(s): PH, PCO2, PO2 in the last 72 hours. No results for input(s): CPK, CKNDX, TROIQ in the last 72 hours.     No lab exists for component: CPKMB  Lab Results   Component Value Date/Time    Cholesterol, total 117 09/25/2015 02:02 PM    HDL Cholesterol 31 (L) 09/25/2015 02:02 PM    LDL, calculated 57 09/25/2015 02:02 PM    Triglyceride 146 09/25/2015 02:02 PM    CHOL/HDL Ratio 7.7 (H) 05/31/2009 10:30 AM     Lab Results   Component Value Date/Time    Glucose (POC) 89 03/28/2018 06:58 AM    Glucose (POC) 67 03/28/2018 06:38 AM    Glucose (POC) 120 (H) 03/27/2018 09:05 PM    Glucose (POC) 106 (H) 03/27/2018 04:18 PM    Glucose (POC) 99 03/27/2018 11:18 AM     Lab Results   Component Value Date/Time    Color YELLOW/STRAW 08/12/2016 09:06 PM    Appearance CLEAR 08/12/2016 09:06 PM    Specific gravity 1.017 08/12/2016 09:06 PM    Specific gravity 1.010 07/11/2016 12:44 PM    pH (UA) 7.0 08/12/2016 09:06 PM    Protein 300 (A) 08/12/2016 09:06 PM    Glucose NEGATIVE  08/12/2016 09:06 PM Ketone TRACE (A) 08/12/2016 09:06 PM    Bilirubin NEGATIVE  07/11/2016 12:44 PM    Urobilinogen 1.0 08/12/2016 09:06 PM    Nitrites NEGATIVE  08/12/2016 09:06 PM    Leukocyte Esterase NEGATIVE  08/12/2016 09:06 PM    Epithelial cells MODERATE (A) 08/12/2016 09:06 PM    Bacteria 1+ (A) 08/12/2016 09:06 PM    WBC 0-4 08/12/2016 09:06 PM    RBC 0-5 08/12/2016 09:06 PM         Medications Reviewed:     Current Facility-Administered Medications   Medication Dose Route Frequency    balsam peru-castor oil (VENELEX)  mg/gram ointment   Topical Q8H    amLODIPine (NORVASC) tablet 5 mg  5 mg Oral DAILY    carvedilol (COREG) tablet 12.5 mg  12.5 mg Oral BID WITH MEALS    dextrose 5% and 0.9% NaCl infusion  50 mL/hr IntraVENous CONTINUOUS    hydrALAZINE (APRESOLINE) 20 mg/mL injection 5 mg  5 mg IntraVENous Q6H PRN    cefepime (MAXIPIME) 1 g in 0.9% sodium chloride (MBP/ADV) 50 mL ADV  1 g IntraVENous Q24H    apixaban (ELIQUIS) tablet 5 mg  5 mg Oral BID    0.9% sodium chloride infusion 250 mL  250 mL IntraVENous PRN    HYDROmorphone (DILAUDID) injection 0.5 mg  0.5 mg IntraVENous Q3H PRN    glucose chewable tablet 16 g  4 Tab Oral PRN    dextrose (D50W) injection syrg 12.5-25 g  12.5-25 g IntraVENous PRN    glucagon (GLUCAGEN) injection 1 mg  1 mg IntraMUSCular PRN    epoetin pia (EPOGEN;PROCRIT) 14,000 Units  14,000 Units SubCUTAneous DIALYSIS TUE, THU & SAT    insulin lispro (HUMALOG) injection   SubCUTAneous AC&HS    predniSONE (DELTASONE) tablet 5 mg  5 mg Oral DAILY    pantoprazole (PROTONIX) tablet 40 mg  40 mg Oral ACB    cyanocobalamin (VITAMIN B12) tablet 2,500 mcg  2,500 mcg Oral DAILY    hydroxychloroquine (PLAQUENIL) tablet 200 mg  200 mg Oral BID    albuterol (PROVENTIL VENTOLIN) nebulizer solution 2.5 mg  2.5 mg Nebulization Q4H PRN    gemfibrozil (LOPID) tablet 300 mg  300 mg Oral DAILY    oxyCODONE IR (ROXICODONE) tablet 2.5-5 mg  2.5-5 mg Oral Q6H PRN    amitriptyline (ELAVIL) tablet 25 mg  25 mg Oral QHS    sodium chloride (NS) flush 5-10 mL  5-10 mL IntraVENous Q8H    sodium chloride (NS) flush 5-10 mL  5-10 mL IntraVENous PRN    ondansetron (ZOFRAN) injection 4 mg  4 mg IntraVENous Q4H PRN    arformoterol (BROVANA) neb solution 15 mcg  15 mcg Nebulization BID RT    And    budesonide (PULMICORT) 500 mcg/2 ml nebulizer suspension  500 mcg Nebulization BID RT     ______________________________________________________________________  EXPECTED LENGTH OF STAY: 4d 21h  ACTUAL LENGTH OF STAY:          Nicolas Kennedy MD

## 2018-03-29 ENCOUNTER — APPOINTMENT (OUTPATIENT)
Dept: CT IMAGING | Age: 33
DRG: 853 | End: 2018-03-29
Attending: HOSPITALIST
Payer: MEDICARE

## 2018-03-29 LAB
ANION GAP SERPL CALC-SCNC: 5 MMOL/L (ref 5–15)
BUN SERPL-MCNC: 11 MG/DL (ref 6–20)
BUN/CREAT SERPL: 3 (ref 12–20)
CALCIUM SERPL-MCNC: 8.3 MG/DL (ref 8.5–10.1)
CHLORIDE SERPL-SCNC: 105 MMOL/L (ref 97–108)
CO2 SERPL-SCNC: 28 MMOL/L (ref 21–32)
CREAT SERPL-MCNC: 4.23 MG/DL (ref 0.55–1.02)
ERYTHROCYTE [DISTWIDTH] IN BLOOD BY AUTOMATED COUNT: 19.9 % (ref 11.5–14.5)
GLUCOSE BLD STRIP.AUTO-MCNC: 76 MG/DL (ref 65–100)
GLUCOSE BLD STRIP.AUTO-MCNC: 88 MG/DL (ref 65–100)
GLUCOSE BLD STRIP.AUTO-MCNC: 90 MG/DL (ref 65–100)
GLUCOSE BLD STRIP.AUTO-MCNC: 92 MG/DL (ref 65–100)
GLUCOSE BLD STRIP.AUTO-MCNC: 93 MG/DL (ref 65–100)
GLUCOSE SERPL-MCNC: 82 MG/DL (ref 65–100)
HCT VFR BLD AUTO: 31.2 % (ref 35–47)
HGB BLD-MCNC: 9.4 G/DL (ref 11.5–16)
MCH RBC QN AUTO: 25.6 PG (ref 26–34)
MCHC RBC AUTO-ENTMCNC: 30.1 G/DL (ref 30–36.5)
MCV RBC AUTO: 85 FL (ref 80–99)
NRBC # BLD: 0 K/UL (ref 0–0.01)
NRBC BLD-RTO: 0 PER 100 WBC
PLATELET # BLD AUTO: 169 K/UL (ref 150–400)
PMV BLD AUTO: 10.5 FL (ref 8.9–12.9)
POTASSIUM SERPL-SCNC: 4 MMOL/L (ref 3.5–5.1)
RBC # BLD AUTO: 3.67 M/UL (ref 3.8–5.2)
SERVICE CMNT-IMP: NORMAL
SODIUM SERPL-SCNC: 138 MMOL/L (ref 136–145)
WBC # BLD AUTO: 3.3 K/UL (ref 3.6–11)

## 2018-03-29 PROCEDURE — 36415 COLL VENOUS BLD VENIPUNCTURE: CPT | Performed by: INTERNAL MEDICINE

## 2018-03-29 PROCEDURE — 94640 AIRWAY INHALATION TREATMENT: CPT

## 2018-03-29 PROCEDURE — 74011000258 HC RX REV CODE- 258: Performed by: INTERNAL MEDICINE

## 2018-03-29 PROCEDURE — 74011636637 HC RX REV CODE- 636/637: Performed by: HOSPITALIST

## 2018-03-29 PROCEDURE — 74011000250 HC RX REV CODE- 250: Performed by: HOSPITALIST

## 2018-03-29 PROCEDURE — 74011250637 HC RX REV CODE- 250/637: Performed by: INTERNAL MEDICINE

## 2018-03-29 PROCEDURE — 74011250637 HC RX REV CODE- 250/637: Performed by: NURSE PRACTITIONER

## 2018-03-29 PROCEDURE — 74011250637 HC RX REV CODE- 250/637: Performed by: HOSPITALIST

## 2018-03-29 PROCEDURE — 94664 DEMO&/EVAL PT USE INHALER: CPT

## 2018-03-29 PROCEDURE — 74011250636 HC RX REV CODE- 250/636: Performed by: HOSPITALIST

## 2018-03-29 PROCEDURE — 65270000029 HC RM PRIVATE

## 2018-03-29 PROCEDURE — 74011250636 HC RX REV CODE- 250/636: Performed by: INTERNAL MEDICINE

## 2018-03-29 PROCEDURE — 77010033678 HC OXYGEN DAILY

## 2018-03-29 PROCEDURE — 80048 BASIC METABOLIC PNL TOTAL CA: CPT | Performed by: INTERNAL MEDICINE

## 2018-03-29 PROCEDURE — 85027 COMPLETE CBC AUTOMATED: CPT | Performed by: INTERNAL MEDICINE

## 2018-03-29 PROCEDURE — 82962 GLUCOSE BLOOD TEST: CPT

## 2018-03-29 PROCEDURE — 90935 HEMODIALYSIS ONE EVALUATION: CPT

## 2018-03-29 RX ORDER — MAGNESIUM SULFATE 100 %
4 CRYSTALS MISCELLANEOUS AS NEEDED
Status: DISCONTINUED | OUTPATIENT
Start: 2018-03-29 | End: 2018-03-29 | Stop reason: SDUPTHER

## 2018-03-29 RX ORDER — INSULIN LISPRO 100 [IU]/ML
INJECTION, SOLUTION INTRAVENOUS; SUBCUTANEOUS
Status: DISCONTINUED | OUTPATIENT
Start: 2018-03-29 | End: 2018-04-07

## 2018-03-29 RX ORDER — DEXTROSE 50 % IN WATER (D50W) INTRAVENOUS SYRINGE
12.5-25 AS NEEDED
Status: DISCONTINUED | OUTPATIENT
Start: 2018-03-29 | End: 2018-03-29 | Stop reason: SDUPTHER

## 2018-03-29 RX ADMIN — BUDESONIDE 500 MCG: 0.5 INHALANT RESPIRATORY (INHALATION) at 22:31

## 2018-03-29 RX ADMIN — OXYCODONE HYDROCHLORIDE 5 MG: 5 TABLET ORAL at 14:57

## 2018-03-29 RX ADMIN — HYDROMORPHONE HYDROCHLORIDE 0.5 MG: 2 INJECTION INTRAMUSCULAR; INTRAVENOUS; SUBCUTANEOUS at 09:08

## 2018-03-29 RX ADMIN — HYDROMORPHONE HYDROCHLORIDE 0.5 MG: 2 INJECTION INTRAMUSCULAR; INTRAVENOUS; SUBCUTANEOUS at 01:21

## 2018-03-29 RX ADMIN — CASTOR OIL AND BALSAM, PERU: 788; 87 OINTMENT TOPICAL at 21:15

## 2018-03-29 RX ADMIN — DEXTROSE MONOHYDRATE AND SODIUM CHLORIDE 50 ML/HR: 5; .9 INJECTION, SOLUTION INTRAVENOUS at 19:04

## 2018-03-29 RX ADMIN — HYDROXYCHLOROQUINE SULFATE 200 MG: 200 TABLET, FILM COATED ORAL at 17:42

## 2018-03-29 RX ADMIN — Medication 10 ML: at 14:55

## 2018-03-29 RX ADMIN — GEMFIBROZIL 300 MG: 600 TABLET ORAL at 09:07

## 2018-03-29 RX ADMIN — CARVEDILOL 12.5 MG: 12.5 TABLET, FILM COATED ORAL at 17:42

## 2018-03-29 RX ADMIN — PANTOPRAZOLE SODIUM 40 MG: 40 TABLET, DELAYED RELEASE ORAL at 06:30

## 2018-03-29 RX ADMIN — HYDROMORPHONE HYDROCHLORIDE 0.5 MG: 2 INJECTION INTRAMUSCULAR; INTRAVENOUS; SUBCUTANEOUS at 16:07

## 2018-03-29 RX ADMIN — Medication 10 ML: at 06:30

## 2018-03-29 RX ADMIN — HYDROMORPHONE HYDROCHLORIDE 0.5 MG: 2 INJECTION INTRAMUSCULAR; INTRAVENOUS; SUBCUTANEOUS at 12:17

## 2018-03-29 RX ADMIN — Medication 2500 MCG: at 09:08

## 2018-03-29 RX ADMIN — HYDROXYCHLOROQUINE SULFATE 200 MG: 200 TABLET, FILM COATED ORAL at 09:07

## 2018-03-29 RX ADMIN — PREDNISONE 5 MG: 5 TABLET ORAL at 09:08

## 2018-03-29 RX ADMIN — HYDROMORPHONE HYDROCHLORIDE 0.5 MG: 2 INJECTION INTRAMUSCULAR; INTRAVENOUS; SUBCUTANEOUS at 19:05

## 2018-03-29 RX ADMIN — APIXABAN 5 MG: 5 TABLET, FILM COATED ORAL at 17:42

## 2018-03-29 RX ADMIN — HYDROMORPHONE HYDROCHLORIDE 0.5 MG: 2 INJECTION INTRAMUSCULAR; INTRAVENOUS; SUBCUTANEOUS at 04:43

## 2018-03-29 RX ADMIN — ERYTHROPOIETIN 14000 UNITS: 10000 INJECTION, SOLUTION INTRAVENOUS; SUBCUTANEOUS at 17:43

## 2018-03-29 RX ADMIN — SENNOSIDES 17.2 MG: 8.6 TABLET, FILM COATED ORAL at 21:13

## 2018-03-29 RX ADMIN — CASTOR OIL AND BALSAM, PERU: 788; 87 OINTMENT TOPICAL at 06:25

## 2018-03-29 RX ADMIN — OXYCODONE HYDROCHLORIDE 5 MG: 5 TABLET ORAL at 00:10

## 2018-03-29 RX ADMIN — AMLODIPINE BESYLATE 5 MG: 5 TABLET ORAL at 09:08

## 2018-03-29 RX ADMIN — ARFORMOTEROL TARTRATE 15 MCG: 15 SOLUTION RESPIRATORY (INHALATION) at 22:31

## 2018-03-29 RX ADMIN — AMITRIPTYLINE HYDROCHLORIDE 25 MG: 10 TABLET, FILM COATED ORAL at 21:13

## 2018-03-29 RX ADMIN — CASTOR OIL AND BALSAM, PERU: 788; 87 OINTMENT TOPICAL at 14:55

## 2018-03-29 RX ADMIN — APIXABAN 5 MG: 5 TABLET, FILM COATED ORAL at 09:08

## 2018-03-29 RX ADMIN — SODIUM CHLORIDE 1 G: 900 INJECTION, SOLUTION INTRAVENOUS at 19:05

## 2018-03-29 RX ADMIN — OXYCODONE HYDROCHLORIDE 5 MG: 5 TABLET ORAL at 21:13

## 2018-03-29 RX ADMIN — HYDROMORPHONE HYDROCHLORIDE 0.5 MG: 2 INJECTION INTRAMUSCULAR; INTRAVENOUS; SUBCUTANEOUS at 22:00

## 2018-03-29 NOTE — DIALYSIS
[facility] D[facility] Team [facility]  [phone]    Vitals   Pre   Post   Assessment   Pre   Post     Temp  Temp: 98.7 °F (37.1 °C) (03/29/18 0950)  98.7     LOC  A/OX3 A/OX3   HR   Pulse (Heart Rate): 98 (03/29/18 0950) 103 Lungs   CTA  CTA   B/P   BP: (!) 135/96 (HD started) (03/29/18 0950) 122/91 Cardiac   NSR  NSR   Resp   Resp Rate: 18 (03/29/18 0950) 18 Skin   Warm and dry  warm and dry   Pain level  Pain Intensity 1: 8 (03/29/18 0900) 0/10 Edema  None      None    Orders:    Duration:   Start:   0950 End:    1250 Total:   3 hrs   Dialyzer:   Dialyzer/Set Up Inspection: Revaclear (03/29/18 0950)   [place] Brake Bath:   Dialysate K (mEq/L): 3 (03/29/18 0950)   Ca Bath:   Dialysate CA (mEq/L): 2.5 (03/29/18 0950)   Na/Bicarb:   Dialysate NA (mEq/L): 140 (03/29/18 0950)   Target Fluid Removal:   Goal/Amount of Fluid to Remove (mL): 1500 mL (03/27/18 1215)   Access     Type & Location:   PETER AVG, + B/T, cannulated with 15g needles   Labs     Obtained/Reviewed   Critical Results Called   Date when labs were drawn-  Hgb-    HGB   Date Value Ref Range Status   03/29/2018 9.4 (L) 11.5 - 16.0 g/dL Final     K-    Potassium   Date Value Ref Range Status   03/29/2018 4.0 3.5 - 5.1 mmol/L Final     Ca-   Calcium   Date Value Ref Range Status   03/29/2018 8.3 (L) 8.5 - 10.1 MG/DL Final     Bun-   BUN   Date Value Ref Range Status   03/29/2018 11 6 - 20 MG/DL Final     Creat-   Creatinine   Date Value Ref Range Status   03/29/2018 4.23 (H) 0.55 - 1.02 MG/DL Final        Medications/ Blood Products Given     Name   Dose   Route and Time     None                 Blood Volume Processed (BVP):    65.8 L Net Fluid   Removed:  1500 ml   Comments   Time Out Done: yes   Primary Nurse Rpt Pre:Bessy Villarreal, RN  Primary Nurse Rpt Sharon Maynard, DANITZA  Pt Education:access care   Care Plan:continue HD tx per ordered  Tx Summary:tolerated tx well, BP stable, no acute distress, all possible blood returned  Admiting Diagnosis:  Pt's previous clinic-  Consent signed - Informed Consent Verified: Yes (03/29/18 0950)  [facility] Consent - yes   Hepatitis Status- AB 10 1/11/18; neg 12/26/18  Machine #- [id number]  Telemetry status-none   Pre-dialysis wt. - Pre-Dialysis Weight: 67.7 kg (149 lb 4 oz) (03/29/18 0950)

## 2018-03-29 NOTE — PROGRESS NOTES
Physical Therapy  3/29/2018    Pt currently in dialysis. PT to follow up later today as able and appropriate.      Nataliia Means, PTA

## 2018-03-29 NOTE — PROGRESS NOTES
Hospitalist Progress Note  Federico Rush MD  Answering service: [phone] [place] [phone] [facility] from in house phone  Cell: [phone]         Date of Service:  3/29/2018  NAME:  Abdi Recinos  :  1985  MRN:  [id number]  Admission Summary: This is a 29-year-old female with multiple medical problems including SLE, end-stage renal disease (on hemodialysis, TTS), pulmonary embolism, on Eliquis (), hyperlipidemia, hypertension, recent Candida peritonitis, chronic bilateral pain.  She comes over here because of abdominal pain since last 4 or 5 days.               Interval history / Subjective:     3/20:   Pt yet with abd pain but better, no n.v,  Had HD today,   - low albumin met with poor po intake. Has supplements. 3/21:  A bit more swollen , cont with lower abd pain, but eating ( little ) no new fever spikes. 3/22:  Less swollen post HD,  Yet with pain lower abd, yet eats poorly, abd exam benign, drain in     3/23:  Pt with cont pain, seems real , discussed case with RN;  Also discussed case with pt, the decision is to do another CT of abd/pelvis  to see if we can come upon some understanding of why she is experiencing such continued pain that is requiring routine dilaudid as hers is ,  It is noted that the oral oxy does not seem to work as well as the iv dilaudid , may consider asking palliative medicine to participate in this pain management     3/24:  CT from yesterday noting cont collection of fluid low and abd not changed since 3/17 cT. Will ask surg to see if they have options to help. Pt remains in pain.      3/25:  Radiology has reviewed films and US of abd and feel that the lt and rt crescent shaped fluid collection communicates well and no further intervention needed. ;plus no drain able abscess/fluid collection noted on US on left abdomen    3/26:  Not eating much, had good night, no new issues.            Assessment & Plan:    Acute peritonitis  -recent candida peritonitis  -CT abd 3/11 Large amount of free intraperitoneal fluid appears somewhat loculated  -Follow cultures, blood culture 3/11 unremarkable. Peritoneal fluid heavy E coli  -Antibiotics per ID  -Was on Vanc/Zosyn/Fluconazole. Now Vanc/FLuconazole discontinued. Continue Cefepime  -s/p paracentesis  - Repeat CT on 3/17 shows improvement on loculated fluid. Repeat fluid culture and cell count ordered by nephro on 3/17. C/s from 3/18 positive for ecoli , continue cefepime   Off imuran now  -Repeat CT abd 3/23 The crescentic peritoneal collection extending from right to left in the anterior abdomen is again noted. It is relatively unchanged in size and extent compared to the previous study. The drainage end of the pigtail catheter remains in the right side of the collection. Trace ascites is noted  -US 3/25 no drain able fluid lt abdomen  -The patient might need more drainage of abdominal fluid, not sure if the catheter would be able to drain all of it, now. ?Surgery vs prolonged antibiotics  -Appreciate discussion with ID  -Spoke to Dr Chelita Garcia, recommended CT abd. Got HD today so will get CT abd 3/30     E coli bacteremia/Sepsis   that the patient was found to have E coli bacteremia at HD      Hypotension now with HTN  -Takes coreg 12.5 bid at home, now on 6.25,will increase as tolerated. -now resolved  -Was on stress steroids  -Now on regular oral steroids    Thrombocytopenia   -Monitor closely, improving  -Switched Zosyn to cefepime. Stable     Bilateral pedal edema  -Dopplers negative for DVT  - mild edema 3/20/2018      SLE  -continue home meds  -D/C'd Imuran, allopurinol by nephro with acute infection and thrombocytopenia.      History of PE  -on Eliquis since 2012  See note from previous hosptalist  -Appreciate discussion with PMD 3/13. The patient was seen by hematology in 2013 but seems was lost to follow up.  I think at that time the plan was to stop anticoagulation if her SLE is stable. Spoke to Dr Pedro Macdonald, he feels that the patient can come off Eliquis. His records do not suggest any recurrence of DVT/PE. Spoke to Dr Varun Hanna, he has not seen the patient since >1 yr. If antiphospholipid antibodies negative then the patient can come off Eliquis  -10/17 V/Q high probability for PE  -Per oncology the patient should be continued to Eliquis indefinitely      ESRD  -on HD TTS     Anemia  -of chronic disease  -Transfused 2 units PRBC with HD 3/15    Severe hepatic steatosis  -monitor     HTN  -stable     Severe protein calorie malnutrition  -Consult nutrition  -nutritional supp     Appreciate Nephro, ID, surgery input.     Renal diet     Code status: FULL CODE  DVT prophylaxis: Eliquis  PTA: home     Plan: CT abd tomorrow. Follow ID, nephrology, surgery     Care Plan discussed with: Patient/Family  Disposition: Home w/Family          Hospital Problems  Date Reviewed: 3/11/2018          Codes Class Noted POA    * (Principal)Peritonitis ([facility] [address] ICD-10-CM: K65.9  ICD-9-CM: 567.9  3/11/2018 Yes                Review of Systems:   Stable ROS Yet wit pain but management med working. No n/v but poor apetite , no cp no sob     Vital Signs:    Last 24hrs VS reviewed since prior progress note. Most recent are:  Visit Vitals    BP (!) 167/118 (BP 1 Location: Left arm, BP Patient Position: Sitting)    Pulse (!) 105    Temp 98.2 °F (36.8 °C)    Resp 18    Ht 5' 5\" (1.651 m)    Wt 67.7 kg (149 lb 3.2 oz)    SpO2 97%    BMI 24.83 kg/m2         Intake/Output Summary (Last 24 hours) at 03/29/18 1620  Last data filed at 03/29/18 1612   Gross per 24 hour   Intake                0 ml   Output             1630 ml   Net            -1630 ml        Physical Examination:             Constitutional:  No acute distress, cooperative, pleasant    ENT:  Oral mucous moist, oropharynx benign. Neck supple,    Resp:  CTA bilaterally. No wheezing/rhonchi/rales.  No accessory muscle use   CV:  Regular rhythm, normal rate, no murmurs, gallops, rubs    GI:  Soft, non distended, mild lower abd tender. normoactive bowel sounds, no hepatosplenomegaly right abd drain in place    Musculoskeletal:  No edema, warm, 2+ pulses throughout    Neurologic:  Moves all extremities. AAOx3, CN II-XII reviewed     Psych:  Good insight, Not anxious nor agitated. Skin:  Good turgor, no rashes or ulcers       Data Review:    Review and/or order of clinical lab test      Labs:     Recent Labs      03/29/18   0437  03/27/18   1220   WBC  3.3*  4.1   HGB  9.4*  8.3*   HCT  31.2*  27.3*   PLT  169  159     Recent Labs      03/29/18   0437  03/27/18   1220   NA  138  139   K  4.0  4.1   CL  105  105   CO2  28  26   BUN  11  11   CREA  4.23*  4.01*   GLU  82  87   CA  8.3*  7.6*     No results for input(s): SGOT, GPT, ALT, AP, TBIL, TBILI, TP, ALB, GLOB, GGT, AML, LPSE in the last 72 hours. No lab exists for component: AMYP, HLPSE  No results for input(s): INR, PTP, APTT in the last 72 hours. No lab exists for component: INREXT, INREXT   No results for input(s): FE, TIBC, PSAT, FERR in the last 72 hours. Lab Results   Component Value Date/Time    Folate 4.1 (L) 03/15/2018 10:32 AM      No results for input(s): PH, PCO2, PO2 in the last 72 hours. No results for input(s): CPK, CKNDX, TROIQ in the last 72 hours.     No lab exists for component: CPKMB  Lab Results   Component Value Date/Time    Cholesterol, total 117 09/25/2015 02:02 PM    HDL Cholesterol 31 (L) 09/25/2015 02:02 PM    LDL, calculated 57 09/25/2015 02:02 PM    Triglyceride 146 09/25/2015 02:02 PM    CHOL/HDL Ratio 7.7 (H) 05/31/2009 10:30 AM     Lab Results   Component Value Date/Time    Glucose (POC) 88 03/29/2018 11:50 AM    Glucose (POC) 76 03/29/2018 11:14 AM    Glucose (POC) 90 03/29/2018 06:59 AM    Glucose (POC) 80 03/28/2018 10:06 PM    Glucose (POC) 107 (H) 03/28/2018 04:55 PM     Lab Results   Component Value Date/Time    Color YELLOW/STRAW 08/12/2016 09:06 PM Appearance CLEAR 08/12/2016 09:06 PM    Specific gravity 1.017 08/12/2016 09:06 PM    Specific gravity 1.010 07/11/2016 12:44 PM    pH (UA) 7.0 08/12/2016 09:06 PM    Protein 300 (A) 08/12/2016 09:06 PM    Glucose NEGATIVE  08/12/2016 09:06 PM    Ketone TRACE (A) 08/12/2016 09:06 PM    Bilirubin NEGATIVE  07/11/2016 12:44 PM    Urobilinogen 1.0 08/12/2016 09:06 PM    Nitrites NEGATIVE  08/12/2016 09:06 PM    Leukocyte Esterase NEGATIVE  08/12/2016 09:06 PM    Epithelial cells MODERATE (A) 08/12/2016 09:06 PM    Bacteria 1+ (A) 08/12/2016 09:06 PM    WBC 0-4 08/12/2016 09:06 PM    RBC 0-5 08/12/2016 09:06 PM         Medications Reviewed:     Current Facility-Administered Medications   Medication Dose Route Frequency    insulin lispro (HUMALOG) injection   SubCUTAneous AC&HS    glucose chewable tablet 16 g  4 Tab Oral PRN    dextrose (D50W) injection syrg 12.5-25 g  12.5-25 g IntraVENous PRN    glucagon (GLUCAGEN) injection 1 mg  1 mg IntraMUSCular PRN    senna (SENOKOT) tablet 17.2 mg  2 Tab Oral QHS    balsam peru-castor oil (VENELEX)  mg/gram ointment   Topical Q8H    amLODIPine (NORVASC) tablet 5 mg  5 mg Oral DAILY    carvedilol (COREG) tablet 12.5 mg  12.5 mg Oral BID WITH MEALS    dextrose 5% and 0.9% NaCl infusion  50 mL/hr IntraVENous CONTINUOUS    hydrALAZINE (APRESOLINE) 20 mg/mL injection 5 mg  5 mg IntraVENous Q6H PRN    cefepime (MAXIPIME) 1 g in 0.9% sodium chloride (MBP/ADV) 50 mL ADV  1 g IntraVENous Q24H    apixaban (ELIQUIS) tablet 5 mg  5 mg Oral BID    0.9% sodium chloride infusion 250 mL  250 mL IntraVENous PRN    HYDROmorphone (DILAUDID) injection 0.5 mg  0.5 mg IntraVENous Q3H PRN    glucose chewable tablet 16 g  4 Tab Oral PRN    dextrose (D50W) injection syrg 12.5-25 g  12.5-25 g IntraVENous PRN    glucagon (GLUCAGEN) injection 1 mg  1 mg IntraMUSCular PRN    epoetin pia (EPOGEN;PROCRIT) 14,000 Units  14,000 Units SubCUTAneous [facility]    predniSONE (DELTASONE) tablet 5 mg  5 mg Oral DAILY    pantoprazole (PROTONIX) tablet 40 mg  40 mg Oral ACB    cyanocobalamin (VITAMIN B12) tablet 2,500 mcg  2,500 mcg Oral DAILY    hydroxychloroquine (PLAQUENIL) tablet 200 mg  200 mg Oral BID    albuterol (PROVENTIL VENTOLIN) nebulizer solution 2.5 mg  2.5 mg Nebulization Q4H PRN    gemfibrozil (LOPID) tablet 300 mg  300 mg Oral DAILY    oxyCODONE IR (ROXICODONE) tablet 2.5-5 mg  2.5-5 mg Oral Q6H PRN    amitriptyline (ELAVIL) tablet 25 mg  25 mg Oral QHS    sodium chloride (NS) flush 5-10 mL  5-10 mL IntraVENous Q8H    sodium chloride (NS) flush 5-10 mL  5-10 mL IntraVENous PRN    ondansetron (ZOFRAN) injection 4 mg  4 mg IntraVENous Q4H PRN    arformoterol (BROVANA) neb solution 15 mcg  15 mcg Nebulization BID RT    And    budesonide (PULMICORT) 500 mcg/2 ml nebulizer suspension  500 mcg Nebulization BID RT     ______________________________________________________________________  EXPECTED LENGTH OF STAY: 4d 21h  ACTUAL LENGTH OF STAY:          Keo Olivier MD

## 2018-03-29 NOTE — WOUND CARE
Wound Care Note:     Follow-up visit for sacral wound    Chart shows:  Admitted for peritonitis  Past Medical History:   Diagnosis Date    Anemia     secondary to lupus    Asthma     no inhaler use in past 2 to 3 years    Carditis     Chronic kidney disease     ESRD    Chronic pain     DDD (degenerative disc disease), lumbar     ESRD (end stage renal disease) ([address]     GERD (gastroesophageal reflux disease)     Heart failure ([facility] [address]     Hemodialysis patient ([facility] [address] 12/21/2017    [address]  Tuesday,  Thursday,  and Saturday.  Hypercholesterolemia     Hypertension     Intractable nausea and vomiting 10/21/2015    Long term (current) use of anticoagulants     Lupus     Lupus (systemic lupus erythematosus) (HCC)     Malignant hypertension with chronic kidney disease stage V ([facility] [address]     Peritoneal dialysis status ([facility] [address] 10/2015    x 2 years Stopped 12/2017 due to infection and removed.  Poor historian 01/17/2018    With medications    Thromboembolus [facility]) 2013    lungs    Transfusion history     Last Transfusion 12/21/2017  at [facility]     WBC = 3.3 on 3/29/18  Admitted from home    Assessment:   Patient is A&O x 4, communicative, continent with no assistance needed in repositioning. Bed: [facility]  Diet: Regular with nutritional supplements  Patient reports \"a little\" pain    Bilateral heels, buttocks, and sacral skin intact and without erythema. 1. Sacral wound has epitheliazed. Reminded patient to lift her buttocks when she is repositioning. We will continue the use of Venelex ointment while she is here. Patient sitting on side of bed eating lunch. Recommendations:    Continue use of Venelex    Sacrum- Every 8 hours apply Venelex ointment.     Moisturize dry skin with Aloe Garland. Skin Care & Pressure Prevention:  Minimize layers of linen/pads under patient to optimize support surface.     Turn/reposition approximately every 2 hours and offload heels.   Promote continence     Discussed above plan with patient & Paulita Holter, RN    Transition of Care: Plan to follow as needed while admitted to hospital.    JOSH Hernández, RN, [facility], [facility].  office [phone]  pager [phone] or call  to page

## 2018-03-29 NOTE — PROGRESS NOTES
Patient name: Kentrell Villarreal  MRN: [id number]    Nephrology Progress note:    Assessment:  ESRD: on HD [facility] -  Seen on dialysis. E. Coli Bacteremia (Hard copy placed in chart): source intra-abd source/ E. Coli peritonitis. IV zosyn. Repeat Bld Cx 3/11-> Neg. Recent hx of Candida peritonitis   - For CT today. HTN:    Hx of PE: on Eliquis    Lupus: Anemia 2 to ESRD/Lupus: Hgb below goal - on EPO    SHPT    Protein malnutrition           Subjective:  \"I feel OK. \"  No pain at present. No N/V. No dyspnea.          Exam:  Visit Vitals    BP (!) 139/99 (BP 1 Location: Left arm, BP Patient Position: At rest)    Pulse 91    Temp 98 °F (36.7 °C)    Resp 18    Ht 5' 5\" (1.651 m)    Wt 67.7 kg (149 lb 3.2 oz)    SpO2 97%    BMI 24.83 kg/m2     Wt Readings from Last 3 Encounters:   03/29/18 67.7 kg (149 lb 3.2 oz)   03/05/18 68 kg (150 lb)   01/29/18 69.4 kg (153 lb)       Intake/Output Summary (Last 24 hours) at 03/29/18 1056  Last data filed at 03/29/18 8423   Gross per 24 hour   Intake                0 ml   Output              155 ml   Net             -155 ml     No edema  NAD    Labs/Data:    Lab Results   Component Value Date/Time    WBC 3.3 (L) 03/29/2018 04:37 AM    Hemoglobin (POC) 7.8 (L) 01/19/2018 12:30 PM    HGB 9.4 (L) 03/29/2018 04:37 AM    Hematocrit (POC) 23 (L) 01/19/2018 12:30 PM    HCT 31.2 (L) 03/29/2018 04:37 AM    PLATELET 125 27/78/1764 04:37 AM    MCV 85.0 03/29/2018 04:37 AM       Lab Results   Component Value Date/Time    Sodium 138 03/29/2018 04:37 AM    Potassium 4.0 03/29/2018 04:37 AM    Chloride 105 03/29/2018 04:37 AM    CO2 28 03/29/2018 04:37 AM    Anion gap 5 03/29/2018 04:37 AM    Glucose 82 03/29/2018 04:37 AM    BUN 11 03/29/2018 04:37 AM    Creatinine 4.23 (H) 03/29/2018 04:37 AM    BUN/Creatinine ratio 3 (L) 03/29/2018 04:37 AM    GFR est AA 15 (L) 03/29/2018 04:37 AM    GFR est non-AA 12 (L) 03/29/2018 04:37 AM    Calcium 8.3 (L) 03/29/2018 04:37 AM

## 2018-03-29 NOTE — PROGRESS NOTES
HYPOGLYCEMIC EPISODE DOCUMENTATION    Patient with hypoglycemic episode(s) at 1114(time) on 3/29/18 (date). BG value(s) pre-treatment 68    Was patient symptomatic?  [] yes, [x] no  Patient was treated with the following rescue medications/treatments: [] D50                [] Glucose tablets                [] Glucagon                [x] 4oz juice                [] 6oz reg soda                [] 8oz low fat milk  BG value post-treatment: 88  Once BG treated and value greater than 80mg/dl, pt was provided with the following:  [x] snack  [] meal

## 2018-03-29 NOTE — DIABETES MGMT
DTC Progress Note    Recommendations/ Comments: Chart reviewed d/t hypoglycemic yesterday and pre lunch today. BG's range 75 mg/dl - 107 mg/dl over the past 24 hours. If appropriate please  Change lispro correction to high sensitivity due to renal status  Document po intake    Current hospital DM medication: correctional Lispro-normal sensitivity     Chart reviewed on Mathias Annabel. Patient is a 29 y.o. female with no history of diabetes. A1c:   Lab Results   Component Value Date/Time    Hemoglobin A1c 5.4 09/25/2015 02:02 PM    Hemoglobin A1c 5.3 03/07/2014 02:16 PM       Recent Glucose Results:   Lab Results   Component Value Date/Time    GLU 82 03/29/2018 04:37 AM    GLUCPOC 88 03/29/2018 11:50 AM    GLUCPOC 76 03/29/2018 11:14 AM    GLUCPOC 90 03/29/2018 06:59 AM        Lab Results   Component Value Date/Time    Creatinine 4.23 (H) 03/29/2018 04:37 AM     Estimated Creatinine Clearance: 17.2 mL/min (based on Cr of 4.23). Active Orders   Diet    DIET REGULAR        PO intake: No data found. Will continue to follow as needed.     Thank you  Bernice Ruff RN, CDE

## 2018-03-30 ENCOUNTER — APPOINTMENT (OUTPATIENT)
Dept: CT IMAGING | Age: 33
DRG: 853 | End: 2018-03-30
Attending: HOSPITALIST
Payer: MEDICARE

## 2018-03-30 LAB
ANION GAP SERPL CALC-SCNC: 6 MMOL/L (ref 5–15)
BUN SERPL-MCNC: 7 MG/DL (ref 6–20)
BUN/CREAT SERPL: 2 (ref 12–20)
CALCIUM SERPL-MCNC: 8 MG/DL (ref 8.5–10.1)
CHLORIDE SERPL-SCNC: 105 MMOL/L (ref 97–108)
CO2 SERPL-SCNC: 27 MMOL/L (ref 21–32)
CREAT SERPL-MCNC: 2.98 MG/DL (ref 0.55–1.02)
ERYTHROCYTE [DISTWIDTH] IN BLOOD BY AUTOMATED COUNT: 19.9 % (ref 11.5–14.5)
GLUCOSE BLD STRIP.AUTO-MCNC: 122 MG/DL (ref 65–100)
GLUCOSE BLD STRIP.AUTO-MCNC: 130 MG/DL (ref 65–100)
GLUCOSE BLD STRIP.AUTO-MCNC: 58 MG/DL (ref 65–100)
GLUCOSE BLD STRIP.AUTO-MCNC: 63 MG/DL (ref 65–100)
GLUCOSE BLD STRIP.AUTO-MCNC: 64 MG/DL (ref 65–100)
GLUCOSE BLD STRIP.AUTO-MCNC: 67 MG/DL (ref 65–100)
GLUCOSE BLD STRIP.AUTO-MCNC: 69 MG/DL (ref 65–100)
GLUCOSE BLD STRIP.AUTO-MCNC: 70 MG/DL (ref 65–100)
GLUCOSE BLD STRIP.AUTO-MCNC: 74 MG/DL (ref 65–100)
GLUCOSE BLD STRIP.AUTO-MCNC: 83 MG/DL (ref 65–100)
GLUCOSE BLD STRIP.AUTO-MCNC: 94 MG/DL (ref 65–100)
GLUCOSE SERPL-MCNC: 69 MG/DL (ref 65–100)
HCT VFR BLD AUTO: 26.5 % (ref 35–47)
HGB BLD-MCNC: 7.9 G/DL (ref 11.5–16)
MCH RBC QN AUTO: 25.8 PG (ref 26–34)
MCHC RBC AUTO-ENTMCNC: 29.8 G/DL (ref 30–36.5)
MCV RBC AUTO: 86.6 FL (ref 80–99)
NRBC # BLD: 0 K/UL (ref 0–0.01)
NRBC BLD-RTO: 0 PER 100 WBC
PLATELET # BLD AUTO: 164 K/UL (ref 150–400)
PMV BLD AUTO: 10.1 FL (ref 8.9–12.9)
POTASSIUM SERPL-SCNC: 3.8 MMOL/L (ref 3.5–5.1)
RBC # BLD AUTO: 3.06 M/UL (ref 3.8–5.2)
SERVICE CMNT-IMP: ABNORMAL
SERVICE CMNT-IMP: NORMAL
SODIUM SERPL-SCNC: 138 MMOL/L (ref 136–145)
WBC # BLD AUTO: 3.6 K/UL (ref 3.6–11)

## 2018-03-30 PROCEDURE — 74011250637 HC RX REV CODE- 250/637: Performed by: INTERNAL MEDICINE

## 2018-03-30 PROCEDURE — 36415 COLL VENOUS BLD VENIPUNCTURE: CPT | Performed by: INTERNAL MEDICINE

## 2018-03-30 PROCEDURE — 94640 AIRWAY INHALATION TREATMENT: CPT

## 2018-03-30 PROCEDURE — 74011250637 HC RX REV CODE- 250/637: Performed by: HOSPITALIST

## 2018-03-30 PROCEDURE — 80048 BASIC METABOLIC PNL TOTAL CA: CPT | Performed by: INTERNAL MEDICINE

## 2018-03-30 PROCEDURE — 65270000029 HC RM PRIVATE

## 2018-03-30 PROCEDURE — 74177 CT ABD & PELVIS W/CONTRAST: CPT

## 2018-03-30 PROCEDURE — 82962 GLUCOSE BLOOD TEST: CPT

## 2018-03-30 PROCEDURE — 74011000258 HC RX REV CODE- 258: Performed by: HOSPITALIST

## 2018-03-30 PROCEDURE — 74011250636 HC RX REV CODE- 250/636: Performed by: HOSPITALIST

## 2018-03-30 PROCEDURE — 74011000250 HC RX REV CODE- 250: Performed by: HOSPITALIST

## 2018-03-30 PROCEDURE — 74011250636 HC RX REV CODE- 250/636: Performed by: INTERNAL MEDICINE

## 2018-03-30 PROCEDURE — 74011250637 HC RX REV CODE- 250/637: Performed by: NURSE PRACTITIONER

## 2018-03-30 PROCEDURE — 74011636320 HC RX REV CODE- 636/320: Performed by: HOSPITALIST

## 2018-03-30 PROCEDURE — 74011000258 HC RX REV CODE- 258: Performed by: INTERNAL MEDICINE

## 2018-03-30 PROCEDURE — 85027 COMPLETE CBC AUTOMATED: CPT | Performed by: INTERNAL MEDICINE

## 2018-03-30 PROCEDURE — 74011636637 HC RX REV CODE- 636/637: Performed by: HOSPITALIST

## 2018-03-30 RX ORDER — SODIUM CHLORIDE 0.9 % (FLUSH) 0.9 %
10 SYRINGE (ML) INJECTION
Status: COMPLETED | OUTPATIENT
Start: 2018-03-30 | End: 2018-03-30

## 2018-03-30 RX ADMIN — HYDROMORPHONE HYDROCHLORIDE 0.5 MG: 2 INJECTION INTRAMUSCULAR; INTRAVENOUS; SUBCUTANEOUS at 01:15

## 2018-03-30 RX ADMIN — HYDROMORPHONE HYDROCHLORIDE 0.5 MG: 2 INJECTION INTRAMUSCULAR; INTRAVENOUS; SUBCUTANEOUS at 04:07

## 2018-03-30 RX ADMIN — SENNOSIDES 17.2 MG: 8.6 TABLET, FILM COATED ORAL at 21:28

## 2018-03-30 RX ADMIN — HYDROMORPHONE HYDROCHLORIDE 0.5 MG: 2 INJECTION INTRAMUSCULAR; INTRAVENOUS; SUBCUTANEOUS at 12:38

## 2018-03-30 RX ADMIN — HYDROMORPHONE HYDROCHLORIDE 0.5 MG: 2 INJECTION INTRAMUSCULAR; INTRAVENOUS; SUBCUTANEOUS at 15:32

## 2018-03-30 RX ADMIN — ONDANSETRON 4 MG: 2 INJECTION INTRAMUSCULAR; INTRAVENOUS at 12:38

## 2018-03-30 RX ADMIN — SODIUM CHLORIDE 1 G: 900 INJECTION, SOLUTION INTRAVENOUS at 19:13

## 2018-03-30 RX ADMIN — DEXTROSE MONOHYDRATE 12.5 G: 500 INJECTION PARENTERAL at 12:15

## 2018-03-30 RX ADMIN — Medication 10 ML: at 07:02

## 2018-03-30 RX ADMIN — ARFORMOTEROL TARTRATE 15 MCG: 15 SOLUTION RESPIRATORY (INHALATION) at 22:07

## 2018-03-30 RX ADMIN — GEMFIBROZIL 300 MG: 600 TABLET ORAL at 09:01

## 2018-03-30 RX ADMIN — HYDROMORPHONE HYDROCHLORIDE 0.5 MG: 2 INJECTION INTRAMUSCULAR; INTRAVENOUS; SUBCUTANEOUS at 21:30

## 2018-03-30 RX ADMIN — HYDROMORPHONE HYDROCHLORIDE 0.5 MG: 2 INJECTION INTRAMUSCULAR; INTRAVENOUS; SUBCUTANEOUS at 09:35

## 2018-03-30 RX ADMIN — CASTOR OIL AND BALSAM, PERU: 788; 87 OINTMENT TOPICAL at 15:33

## 2018-03-30 RX ADMIN — ARFORMOTEROL TARTRATE 15 MCG: 15 SOLUTION RESPIRATORY (INHALATION) at 07:19

## 2018-03-30 RX ADMIN — Medication 16 G: at 06:42

## 2018-03-30 RX ADMIN — PANTOPRAZOLE SODIUM 40 MG: 40 TABLET, DELAYED RELEASE ORAL at 06:15

## 2018-03-30 RX ADMIN — HYDROXYCHLOROQUINE SULFATE 200 MG: 200 TABLET, FILM COATED ORAL at 09:01

## 2018-03-30 RX ADMIN — SODIUM CHLORIDE 100 ML: 900 INJECTION, SOLUTION INTRAVENOUS at 10:15

## 2018-03-30 RX ADMIN — BUDESONIDE 500 MCG: 0.5 INHALANT RESPIRATORY (INHALATION) at 07:19

## 2018-03-30 RX ADMIN — AMITRIPTYLINE HYDROCHLORIDE 25 MG: 10 TABLET, FILM COATED ORAL at 21:28

## 2018-03-30 RX ADMIN — Medication 10 ML: at 10:15

## 2018-03-30 RX ADMIN — HYDROMORPHONE HYDROCHLORIDE 0.5 MG: 2 INJECTION INTRAMUSCULAR; INTRAVENOUS; SUBCUTANEOUS at 18:34

## 2018-03-30 RX ADMIN — APIXABAN 5 MG: 5 TABLET, FILM COATED ORAL at 17:43

## 2018-03-30 RX ADMIN — PREDNISONE 5 MG: 5 TABLET ORAL at 09:01

## 2018-03-30 RX ADMIN — HYDROXYCHLOROQUINE SULFATE 200 MG: 200 TABLET, FILM COATED ORAL at 17:43

## 2018-03-30 RX ADMIN — CASTOR OIL AND BALSAM, PERU: 788; 87 OINTMENT TOPICAL at 22:00

## 2018-03-30 RX ADMIN — Medication 10 ML: at 04:08

## 2018-03-30 RX ADMIN — OXYCODONE HYDROCHLORIDE 5 MG: 5 TABLET ORAL at 02:59

## 2018-03-30 RX ADMIN — Medication 10 ML: at 15:33

## 2018-03-30 RX ADMIN — AMLODIPINE BESYLATE 5 MG: 5 TABLET ORAL at 09:01

## 2018-03-30 RX ADMIN — CASTOR OIL AND BALSAM, PERU: 788; 87 OINTMENT TOPICAL at 07:17

## 2018-03-30 RX ADMIN — CARVEDILOL 12.5 MG: 12.5 TABLET, FILM COATED ORAL at 17:43

## 2018-03-30 RX ADMIN — BUDESONIDE 500 MCG: 0.5 INHALANT RESPIRATORY (INHALATION) at 22:06

## 2018-03-30 RX ADMIN — HYDROMORPHONE HYDROCHLORIDE 0.5 MG: 2 INJECTION INTRAMUSCULAR; INTRAVENOUS; SUBCUTANEOUS at 07:02

## 2018-03-30 RX ADMIN — Medication 2500 MCG: at 09:01

## 2018-03-30 RX ADMIN — PANTOPRAZOLE SODIUM 40 MG: 40 TABLET, DELAYED RELEASE ORAL at 09:09

## 2018-03-30 RX ADMIN — APIXABAN 5 MG: 5 TABLET, FILM COATED ORAL at 09:02

## 2018-03-30 RX ADMIN — Medication 10 ML: at 21:31

## 2018-03-30 RX ADMIN — CARVEDILOL 12.5 MG: 12.5 TABLET, FILM COATED ORAL at 09:01

## 2018-03-30 RX ADMIN — IOPAMIDOL 100 ML: 755 INJECTION, SOLUTION INTRAVENOUS at 10:15

## 2018-03-30 NOTE — PROGRESS NOTES
Problem: Falls - Risk of  Goal: *Absence of Falls  Document Alex Fall Risk and appropriate interventions in the flowsheet.    Outcome: Progressing Towards Goal  Fall Risk Interventions:  Mobility Interventions: Communicate number of staff needed for ambulation/transfer, OT consult for ADLs, Patient to call before getting OOB, PT Consult for mobility concerns, PT Consult for assist device competence, Strengthening exercises (ROM-active/passive), Utilize walker, cane, or other assitive device, Utilize gait belt for transfers/ambulation    Mentation Interventions: More frequent rounding    Medication Interventions: Patient to call before getting OOB, Teach patient to arise slowly    Elimination Interventions: Call light in reach, Elevated toilet seat, Patient to call for help with toileting needs, Toilet paper/wipes in reach, Toileting schedule/hourly rounds

## 2018-03-30 NOTE — PROGRESS NOTES
Patient name: Jose Ruiz  MRN: [id number]    Nephrology Progress note:    Assessment:  ESRD: on HD [facility] -  Plan on HD in AM.    E. Coli Bacteremia (Hard copy placed in chart): source intra-abd source/ E. Coli peritonitis. IV zosyn. Repeat Bld Cx 3/11-> Neg. Recent hx of Candida peritonitis   - For CT today. HTN:    Hx of PE: on Eliquis    Lupus: Anemia 2 to ESRD/Lupus: Hgb below goal - on EPO    SHPT    Protein malnutrition               Subjective:  \"I had my CT today. I may need to have surgery. \"  No pain at present. No N/V. No dyspnea.          Exam:  Visit Vitals    BP (!) 140/106    Pulse (!) 102    Temp 99.1 °F (37.3 °C)    Resp 14    Ht 5' 5\" (1.651 m)    Wt 68.5 kg (151 lb)    SpO2 100%    BMI 25.13 kg/m2     Wt Readings from Last 3 Encounters:   03/30/18 68.5 kg (151 lb)   03/05/18 68 kg (150 lb)   01/29/18 69.4 kg (153 lb)       Intake/Output Summary (Last 24 hours) at 03/30/18 1124  Last data filed at 03/30/18 0601   Gross per 24 hour   Intake                0 ml   Output             1675 ml   Net            -1675 ml     No edema  NAD    Labs/Data:    Lab Results   Component Value Date/Time    WBC 3.6 03/30/2018 03:08 AM    Hemoglobin (POC) 7.8 (L) 01/19/2018 12:30 PM    HGB 7.9 (L) 03/30/2018 03:08 AM    Hematocrit (POC) 23 (L) 01/19/2018 12:30 PM    HCT 26.5 (L) 03/30/2018 03:08 AM    PLATELET 052 30/37/3543 03:08 AM    MCV 86.6 03/30/2018 03:08 AM       Lab Results   Component Value Date/Time    Sodium 138 03/30/2018 03:08 AM Potassium 3.8 03/30/2018 03:08 AM    Chloride 105 03/30/2018 03:08 AM    CO2 27 03/30/2018 03:08 AM    Anion gap 6 03/30/2018 03:08 AM    Glucose 69 03/30/2018 03:08 AM    BUN 7 03/30/2018 03:08 AM    Creatinine 2.98 (H) 03/30/2018 03:08 AM    BUN/Creatinine ratio 2 (L) 03/30/2018 03:08 AM    GFR est AA 22 (L) 03/30/2018 03:08 AM    GFR est non-AA 18 (L) 03/30/2018 03:08 AM    Calcium 8.0 (L) 03/30/2018 03:08 AM

## 2018-03-30 NOTE — PROGRESS NOTES
Surgery Progress Note    Admit Date: 3/11/2018      Subjective:     Complaints of some nausea. She is tolerating diet. Has not had a BM in a few days. One episode of emesis. Objective:     Patient Vitals for the past 8 hrs:   BP Temp Pulse Resp SpO2   03/30/18 1408 123/85 98.8 °F (37.1 °C) 98 14 96 %   03/30/18 1400 (!) 137/97 98.7 °F (37.1 °C) 89 16 98 %   03/30/18 1111 (!) 140/106 - - - -   03/30/18 0904 (!) 142/107 99.1 °F (37.3 °C) (!) 102 14 100 %   03/30/18 0721 - - - - 99 %        03/28 1901 - 03/30 0700  In: -   Out: 4096 [Drains:255]ip  Physical Exam:    General: alert, cooperative, no distress  Cardiac: normal S1 and S2  Lungs: Normal chest wall and respirations. Clear to auscultation. Abdomen:  Soft, mild tenderness. Pigtail catheter in RLQ to gravity. Output 175 ml        CBC: Lab Results   Component Value Date/Time    WBC 3.6 03/30/2018 03:08 AM    RBC 3.06 (L) 03/30/2018 03:08 AM    HGB 7.9 (L) 03/30/2018 03:08 AM    HCT 26.5 (L) 03/30/2018 03:08 AM    PLATELET 223 87/98/9776 03:08 AM     BMP: Lab Results   Component Value Date/Time    Glucose 69 03/30/2018 03:08 AM    Sodium 138 03/30/2018 03:08 AM    Potassium 3.8 03/30/2018 03:08 AM    Chloride 105 03/30/2018 03:08 AM    CO2 27 03/30/2018 03:08 AM    BUN 7 03/30/2018 03:08 AM    Creatinine 2.98 (H) 03/30/2018 03:08 AM    Calcium 8.0 (L) 03/30/2018 03:08 AM     CMP:  Lab Results   Component Value Date/Time    Glucose 69 03/30/2018 03:08 AM    Sodium 138 03/30/2018 03:08 AM    Potassium 3.8 03/30/2018 03:08 AM    Chloride 105 03/30/2018 03:08 AM    CO2 27 03/30/2018 03:08 AM    BUN 7 03/30/2018 03:08 AM    Creatinine 2.98 (H) 03/30/2018 03:08 AM    Calcium 8.0 (L) 03/30/2018 03:08 AM    Anion gap 6 03/30/2018 03:08 AM    BUN/Creatinine ratio 2 (L) 03/30/2018 03:08 AM    Alk.  phosphatase 52 03/22/2018 04:03 AM    Protein, total 5.6 (L) 03/22/2018 04:03 AM    Albumin 1.0 (L) 03/22/2018 04:03 AM    Globulin 4.6 (H) 03/22/2018 04:03 AM    A-G Ratio 0.2 (L) 03/22/2018 04:03 AM       Radiology review: Abd Ct today:    IMPRESSION:  1. Stable peritoneal fluid collection containing a percutaneous drain. 2. Slightly large CBD. 3. Hepatic steatosis. 4. Unchanged trace ascites, pleural effusions, anasarca. Assessment:   Patient Active Hospital Problem List:   Peritonitis [facility]) (3/11/2018)          Plan:   Abx per ID  Monitor drain output  Following  Dr. Jaye Garvin to review imaging and decide if drain need repositioning. Further plan per Dr. Leonela Palacios, NP      Gen Surg @ [facility]  Attending addendum:  I supervised the NP Emilie López) and reviewed the note. I reviewed the CT images from today and compared to 3/23/18. The collection appears unchanged but the pigtail catheter appears in appropriate position. Hospital Problems as of 3/30/2018  Date Reviewed: 3/11/2018          Codes Class Noted - Resolved POA    * (Principal)Peritonitis ([address] ICD-10-CM: K65.9  ICD-9-CM: 567.9  3/11/2018 - Present Yes               I agree with the APC's assessment and plan. Repositioning of the catheter may not help because it is already with the collection. Might consider percutaneous drainage from opposite side.     Signed By: Reena Case MD     March 30, 2018

## 2018-03-30 NOTE — PROGRESS NOTES
Cm continuing to follow for transitions of care, participated in interdisciplinary rounds. Cm will be available if any discharge needs arise.   [employer] , [place]

## 2018-03-30 NOTE — DIABETES MGMT
DTC Progress Note    Recommendations/ Comments: Chart reviewed d/t hypoglycemia, BG 58 mg/dL this morning. Hypo pre lunch 3/29/2018 and fasting and pre lunch on 3/28/2018. BG's range 58 - 93 mg/dl over the past 24 hours. Noted D5NS @ 50/hr running    If appropriate please  Encourage po intake    Current hospital DM medication: correctional Lispro-normal sensitivity     Chart reviewed on [facility]. Patient is a 29 y.o. female with no history of diabetes. A1c:   Lab Results   Component Value Date/Time    Hemoglobin A1c 5.4 09/25/2015 02:02 PM    Hemoglobin A1c 5.3 03/07/2014 02:16 PM       Recent Glucose Results:   Lab Results   Component Value Date/Time    GLU 69 03/30/2018 03:08 AM    GLUCPOC 122 (H) 03/30/2018 07:27 AM    GLUCPOC 70 03/30/2018 07:09 AM    GLUCPOC 58 (L) 03/30/2018 06:38 AM        Lab Results   Component Value Date/Time    Creatinine 2.98 (H) 03/30/2018 03:08 AM     Estimated Creatinine Clearance: 26.4 mL/min (based on Cr of 2.98). Active Orders   Diet    DIET REGULAR        PO intake: No data found. Will continue to follow as needed.     Thank you  Inder Howard RN, CDE

## 2018-03-30 NOTE — PROGRESS NOTES
Hospitalist Progress Note  Sukh Brock MD  Answering service: [phone] OR [phone] from in house phone  Cell: [phone]         Date of Service:  3/30/2018  NAME:  Sarthak Glover  :  1985  MRN:  [id number]  Admission Summary: This is a 29-year-old female with multiple medical problems including SLE, end-stage renal disease (on hemodialysis, TTS), pulmonary embolism, on Eliquis (), hyperlipidemia, hypertension, recent Candida peritonitis, chronic bilateral pain.  She comes over here because of abdominal pain since last 4 or 5 days.               Interval history / Subjective:     3/20:   Pt yet with abd pain but better, no n.v,  Had HD today,   - low albumin met with poor po intake. Has supplements. 3/21:  A bit more swollen , cont with lower abd pain, but eating ( little ) no new fever spikes. 3/22:  Less swollen post HD,  Yet with pain lower abd, yet eats poorly, abd exam benign, drain in     3/23:  Pt with cont pain, seems real , discussed case with RN;  Also discussed case with pt, the decision is to do another CT of abd/pelvis  to see if we can come upon some understanding of why she is experiencing such continued pain that is requiring routine dilaudid as hers is ,  It is noted that the oral oxy does not seem to work as well as the iv dilaudid , may consider asking palliative medicine to participate in this pain management     3/24:  CT from yesterday noting cont collection of fluid low and abd not changed since 3/17 cT. Will ask surg to see if they have options to help. Pt remains in pain.      3/25:  Radiology has reviewed films and US of abd and feel that the lt and rt crescent shaped fluid collection communicates well and no further intervention needed. ;plus no drain able abscess/fluid collection noted on US on left abdomen    3/26:  Not eating much, had good night, no new issues.            Assessment & Plan:    Acute peritonitis  -recent candida peritonitis  -CT abd 3/11 Large amount of free intraperitoneal fluid appears somewhat loculated  -Follow cultures, blood culture 3/11 unremarkable. Peritoneal fluid heavy E coli  -Antibiotics per ID  -Was on Vanc/Zosyn/Fluconazole. Now Vanc/FLuconazole discontinued. Continue Cefepime  -s/p paracentesis  - Repeat CT on 3/17 shows improvement on loculated fluid. Repeat fluid culture and cell count ordered by nephro on 3/17. C/s from 3/18 positive for ecoli , continue cefepime   Off imuran now  -Repeat CT abd 3/23 The crescentic peritoneal collection extending from right to left in the anterior abdomen is again noted. It is relatively unchanged in size and extent compared to the previous study. The drainage end of the pigtail catheter remains in the right side of the collection. Trace ascites is noted  -US 3/25 no drain able fluid lt abdomen  -The patient might need more drainage of abdominal fluid, not sure if the catheter would be able to drain all of it, now. ?Surgery vs prolonged antibiotics  -Appreciate discussion with ID  -Spoke to Dr Saul Yost, recommended CT abd. Got HD today so will get CT abd 3/30     E coli bacteremia/Sepsis   that the patient was found to have E coli bacteremia at HD      Hypotension now with HTN  -Takes coreg 12.5 bid at home, now on 6.25,will increase as tolerated. -now resolved  -Was on stress steroids  -Now on regular oral steroids    Thrombocytopenia   -Monitor closely, improving  -Switched Zosyn to cefepime. Stable     Bilateral pedal edema  -Dopplers negative for DVT  - mild edema 3/20/2018      SLE  -continue home meds  -D/C'd Imuran, allopurinol by nephro with acute infection and thrombocytopenia.      History of PE  -on Eliquis since 2012  See note from previous hosptalist  -Appreciate discussion with PMD 3/13. The patient was seen by hematology in 2013 but seems was lost to follow up.  I think at that time the plan was to stop anticoagulation if her SLE is stable. Spoke to Dr Brenda Villanueva, he feels that the patient can come off Eliquis. His records do not suggest any recurrence of DVT/PE. Spoke to Dr Jv Doss, he has not seen the patient since >1 yr. If antiphospholipid antibodies negative then the patient can come off Eliquis  -10/17 V/Q high probability for PE  -Per oncology the patient should be continued to Eliquis indefinitely      ESRD  -on HD TTS     Anemia  -of chronic disease  -Transfused 2 units PRBC with HD 3/15    Severe hepatic steatosis  -monitor     HTN  -stable     Severe protein calorie malnutrition  -Consult nutrition  -nutritional supp     Appreciate Nephro, ID, surgery input.     Renal diet     Code status: FULL CODE  DVT prophylaxis: Eliquis  PTA: home     Plan: CT abd tomorrow. Follow ID, nephrology, surgery     Care Plan discussed with: Patient/Family  Disposition: Home w/Family          Hospital Problems  Date Reviewed: 3/11/2018          Codes Class Noted POA    * (Principal)Peritonitis ([facility] [address] ICD-10-CM: K65.9  ICD-9-CM: 567.9  3/11/2018 Yes                Review of Systems:   Stable ROS Yet wit pain but management med working. No n/v but poor apetite , no cp no sob     Vital Signs:    Last 24hrs VS reviewed since prior progress note. Most recent are:  Visit Vitals    BP (!) 140/106    Pulse (!) 102    Temp 99.1 °F (37.3 °C)    Resp 14    Ht 5' 5\" (1.651 m)    Wt 68.5 kg (151 lb)    SpO2 100%    BMI 25.13 kg/m2         Intake/Output Summary (Last 24 hours) at 03/30/18 1122  Last data filed at 03/30/18 0601   Gross per 24 hour   Intake                0 ml   Output             1675 ml   Net            -1675 ml        Physical Examination:             Constitutional:  No acute distress, cooperative, pleasant    ENT:  Oral mucous moist, oropharynx benign. Neck supple,    Resp:  CTA bilaterally. No wheezing/rhonchi/rales.  No accessory muscle use   CV:  Regular rhythm, normal rate, no murmurs, gallops, rubs    GI:  Soft, non distended, mild lower abd tender. normoactive bowel sounds, no hepatosplenomegaly right abd drain in place    Musculoskeletal:  No edema, warm, 2+ pulses throughout    Neurologic:  Moves all extremities. AAOx3, CN II-XII reviewed     Psych:  Good insight, Not anxious nor agitated. Skin:  Good turgor, no rashes or ulcers       Data Review:    Review and/or order of clinical lab test      Labs:     Recent Labs      03/30/18   0308  03/29/18   0437   WBC  3.6  3.3*   HGB  7.9*  9.4*   HCT  26.5*  31.2*   PLT  164  169     Recent Labs      03/30/18   0308  03/29/18   0437  03/27/18   1220   NA  138  138  139   K  3.8  4.0  4.1   CL  105  105  105   CO2  27  28  26   BUN  7  11  11   CREA  2.98*  4.23*  4.01*   GLU  69  82  87   CA  8.0*  8.3*  7.6*     No results for input(s): SGOT, GPT, ALT, AP, TBIL, TBILI, TP, ALB, GLOB, GGT, AML, LPSE in the last 72 hours. No lab exists for component: AMYP, HLPSE  No results for input(s): INR, PTP, APTT in the last 72 hours. No lab exists for component: INREXT, INREXT   No results for input(s): FE, TIBC, PSAT, FERR in the last 72 hours. Lab Results   Component Value Date/Time    Folate 4.1 (L) 03/15/2018 10:32 AM      No results for input(s): PH, PCO2, PO2 in the last 72 hours. No results for input(s): CPK, CKNDX, TROIQ in the last 72 hours.     No lab exists for component: CPKMB  Lab Results   Component Value Date/Time    Cholesterol, total 117 09/25/2015 02:02 PM    HDL Cholesterol 31 (L) 09/25/2015 02:02 PM    LDL, calculated 57 09/25/2015 02:02 PM    Triglyceride 146 09/25/2015 02:02 PM    CHOL/HDL Ratio 7.7 (H) 05/31/2009 10:30 AM     Lab Results   Component Value Date/Time    Glucose (POC) 63 (L) 03/30/2018 11:12 AM    Glucose (POC) 122 (H) 03/30/2018 07:27 AM    Glucose (POC) 70 03/30/2018 07:09 AM    Glucose (POC) 58 (L) 03/30/2018 06:38 AM    Glucose (POC) 69 03/30/2018 06:14 AM     Lab Results   Component Value Date/Time    Color YELLOW/STRAW 08/12/2016 09:06 PM Appearance CLEAR 08/12/2016 09:06 PM    Specific gravity 1.017 08/12/2016 09:06 PM    Specific gravity 1.010 07/11/2016 12:44 PM    pH (UA) 7.0 08/12/2016 09:06 PM    Protein 300 (A) 08/12/2016 09:06 PM    Glucose NEGATIVE  08/12/2016 09:06 PM    Ketone TRACE (A) 08/12/2016 09:06 PM    Bilirubin NEGATIVE  07/11/2016 12:44 PM    Urobilinogen 1.0 08/12/2016 09:06 PM    Nitrites NEGATIVE  08/12/2016 09:06 PM    Leukocyte Esterase NEGATIVE  08/12/2016 09:06 PM    Epithelial cells MODERATE (A) 08/12/2016 09:06 PM    Bacteria 1+ (A) 08/12/2016 09:06 PM    WBC 0-4 08/12/2016 09:06 PM    RBC 0-5 08/12/2016 09:06 PM         Medications Reviewed:     Current Facility-Administered Medications   Medication Dose Route Frequency    iohexol (OMNIPAQUE) solution 50 mL  50 mL Oral RAD ONCE    insulin lispro (HUMALOG) injection   SubCUTAneous AC&HS    senna (SENOKOT) tablet 17.2 mg  2 Tab Oral QHS    balsam peru-castor oil (VENELEX)  mg/gram ointment   Topical Q8H    amLODIPine (NORVASC) tablet 5 mg  5 mg Oral DAILY    carvedilol (COREG) tablet 12.5 mg  12.5 mg Oral BID WITH MEALS    dextrose 5% and 0.9% NaCl infusion  50 mL/hr IntraVENous CONTINUOUS    hydrALAZINE (APRESOLINE) 20 mg/mL injection 5 mg  5 mg IntraVENous Q6H PRN    cefepime (MAXIPIME) 1 g in 0.9% sodium chloride (MBP/ADV) 50 mL ADV  1 g IntraVENous Q24H    apixaban (ELIQUIS) tablet 5 mg  5 mg Oral BID    0.9% sodium chloride infusion 250 mL  250 mL IntraVENous PRN    HYDROmorphone (DILAUDID) injection 0.5 mg  0.5 mg IntraVENous Q3H PRN    glucose chewable tablet 16 g  4 Tab Oral PRN    dextrose (D50W) injection syrg 12.5-25 g  12.5-25 g IntraVENous PRN    glucagon (GLUCAGEN) injection 1 mg  1 mg IntraMUSCular PRN    epoetin pia (EPOGEN;PROCRIT) 14,000 Units  14,000 Units SubCUTAneous DIALYSIS TUE, THU & SAT    predniSONE (DELTASONE) tablet 5 mg  5 mg Oral DAILY    pantoprazole (PROTONIX) tablet 40 mg  40 mg Oral ACB    cyanocobalamin (VITAMIN B12) tablet 2,500 mcg  2,500 mcg Oral DAILY    hydroxychloroquine (PLAQUENIL) tablet 200 mg  200 mg Oral BID    albuterol (PROVENTIL VENTOLIN) nebulizer solution 2.5 mg  2.5 mg Nebulization Q4H PRN    gemfibrozil (LOPID) tablet 300 mg  300 mg Oral DAILY    oxyCODONE IR (ROXICODONE) tablet 2.5-5 mg  2.5-5 mg Oral Q6H PRN    amitriptyline (ELAVIL) tablet 25 mg  25 mg Oral QHS    sodium chloride (NS) flush 5-10 mL  5-10 mL IntraVENous Q8H    sodium chloride (NS) flush 5-10 mL  5-10 mL IntraVENous PRN    ondansetron (ZOFRAN) injection 4 mg  4 mg IntraVENous Q4H PRN    arformoterol (BROVANA) neb solution 15 mcg  15 mcg Nebulization BID RT    And    budesonide (PULMICORT) 500 mcg/2 ml nebulizer suspension  500 mcg Nebulization BID RT     ______________________________________________________________________  EXPECTED LENGTH OF STAY: 4d 21h  ACTUAL LENGTH OF STAY:          19                 Lio Baker MD

## 2018-03-30 NOTE — ROUTINE PROCESS
Bedside shift change report given to Migue (oncoming nurse) by Alexander Burns (offgoing nurse). Report included the following information SBAR, Kardex, ED Summary, Procedure Summary, Intake/Output and MAR.

## 2018-03-31 LAB
GLUCOSE BLD STRIP.AUTO-MCNC: 114 MG/DL (ref 65–100)
GLUCOSE BLD STRIP.AUTO-MCNC: 123 MG/DL (ref 65–100)
GLUCOSE BLD STRIP.AUTO-MCNC: 125 MG/DL (ref 65–100)
GLUCOSE BLD STRIP.AUTO-MCNC: 62 MG/DL (ref 65–100)
GLUCOSE BLD STRIP.AUTO-MCNC: 74 MG/DL (ref 65–100)
GLUCOSE BLD STRIP.AUTO-MCNC: 76 MG/DL (ref 65–100)
GLUCOSE BLD STRIP.AUTO-MCNC: 96 MG/DL (ref 65–100)
SERVICE CMNT-IMP: ABNORMAL
SERVICE CMNT-IMP: NORMAL

## 2018-03-31 PROCEDURE — 74011250636 HC RX REV CODE- 250/636: Performed by: INTERNAL MEDICINE

## 2018-03-31 PROCEDURE — 87340 HEPATITIS B SURFACE AG IA: CPT | Performed by: INTERNAL MEDICINE

## 2018-03-31 PROCEDURE — 74011250637 HC RX REV CODE- 250/637: Performed by: NURSE PRACTITIONER

## 2018-03-31 PROCEDURE — 90935 HEMODIALYSIS ONE EVALUATION: CPT

## 2018-03-31 PROCEDURE — 74011250637 HC RX REV CODE- 250/637: Performed by: INTERNAL MEDICINE

## 2018-03-31 PROCEDURE — 74011000258 HC RX REV CODE- 258: Performed by: INTERNAL MEDICINE

## 2018-03-31 PROCEDURE — 74011636637 HC RX REV CODE- 636/637: Performed by: HOSPITALIST

## 2018-03-31 PROCEDURE — 74011250637 HC RX REV CODE- 250/637: Performed by: HOSPITALIST

## 2018-03-31 PROCEDURE — 94664 DEMO&/EVAL PT USE INHALER: CPT

## 2018-03-31 PROCEDURE — 82962 GLUCOSE BLOOD TEST: CPT

## 2018-03-31 PROCEDURE — 94640 AIRWAY INHALATION TREATMENT: CPT

## 2018-03-31 PROCEDURE — 77010033678 HC OXYGEN DAILY

## 2018-03-31 PROCEDURE — 36415 COLL VENOUS BLD VENIPUNCTURE: CPT | Performed by: INTERNAL MEDICINE

## 2018-03-31 PROCEDURE — 65270000029 HC RM PRIVATE

## 2018-03-31 PROCEDURE — 74011000250 HC RX REV CODE- 250: Performed by: HOSPITALIST

## 2018-03-31 PROCEDURE — 74011250636 HC RX REV CODE- 250/636: Performed by: HOSPITALIST

## 2018-03-31 PROCEDURE — 86706 HEP B SURFACE ANTIBODY: CPT | Performed by: INTERNAL MEDICINE

## 2018-03-31 RX ADMIN — PANTOPRAZOLE SODIUM 40 MG: 40 TABLET, DELAYED RELEASE ORAL at 06:43

## 2018-03-31 RX ADMIN — APIXABAN 5 MG: 5 TABLET, FILM COATED ORAL at 08:29

## 2018-03-31 RX ADMIN — Medication 2500 MCG: at 08:33

## 2018-03-31 RX ADMIN — PREDNISONE 5 MG: 5 TABLET ORAL at 08:29

## 2018-03-31 RX ADMIN — BUDESONIDE 500 MCG: 0.5 INHALANT RESPIRATORY (INHALATION) at 08:20

## 2018-03-31 RX ADMIN — HYDROMORPHONE HYDROCHLORIDE 0.5 MG: 2 INJECTION INTRAMUSCULAR; INTRAVENOUS; SUBCUTANEOUS at 19:06

## 2018-03-31 RX ADMIN — CARVEDILOL 12.5 MG: 12.5 TABLET, FILM COATED ORAL at 08:29

## 2018-03-31 RX ADMIN — HYDROMORPHONE HYDROCHLORIDE 0.5 MG: 2 INJECTION INTRAMUSCULAR; INTRAVENOUS; SUBCUTANEOUS at 06:43

## 2018-03-31 RX ADMIN — HYDROMORPHONE HYDROCHLORIDE 0.5 MG: 2 INJECTION INTRAMUSCULAR; INTRAVENOUS; SUBCUTANEOUS at 10:02

## 2018-03-31 RX ADMIN — ERYTHROPOIETIN 14000 UNITS: 10000 INJECTION, SOLUTION INTRAVENOUS; SUBCUTANEOUS at 17:57

## 2018-03-31 RX ADMIN — Medication 10 ML: at 13:03

## 2018-03-31 RX ADMIN — Medication 10 ML: at 21:27

## 2018-03-31 RX ADMIN — DEXTROSE MONOHYDRATE 12.5 G: 500 INJECTION PARENTERAL at 11:47

## 2018-03-31 RX ADMIN — Medication 10 ML: at 06:43

## 2018-03-31 RX ADMIN — HYDROMORPHONE HYDROCHLORIDE 0.5 MG: 2 INJECTION INTRAMUSCULAR; INTRAVENOUS; SUBCUTANEOUS at 13:01

## 2018-03-31 RX ADMIN — CASTOR OIL AND BALSAM, PERU: 788; 87 OINTMENT TOPICAL at 06:49

## 2018-03-31 RX ADMIN — HYDROXYCHLOROQUINE SULFATE 200 MG: 200 TABLET, FILM COATED ORAL at 17:51

## 2018-03-31 RX ADMIN — ARFORMOTEROL TARTRATE 15 MCG: 15 SOLUTION RESPIRATORY (INHALATION) at 08:20

## 2018-03-31 RX ADMIN — AMLODIPINE BESYLATE 5 MG: 5 TABLET ORAL at 08:29

## 2018-03-31 RX ADMIN — HYDROXYCHLOROQUINE SULFATE 200 MG: 200 TABLET, FILM COATED ORAL at 08:29

## 2018-03-31 RX ADMIN — HYDROMORPHONE HYDROCHLORIDE 0.5 MG: 2 INJECTION INTRAMUSCULAR; INTRAVENOUS; SUBCUTANEOUS at 16:17

## 2018-03-31 RX ADMIN — CASTOR OIL AND BALSAM, PERU: 788; 87 OINTMENT TOPICAL at 16:17

## 2018-03-31 RX ADMIN — AMITRIPTYLINE HYDROCHLORIDE 25 MG: 10 TABLET, FILM COATED ORAL at 21:25

## 2018-03-31 RX ADMIN — HYDROMORPHONE HYDROCHLORIDE 0.5 MG: 2 INJECTION INTRAMUSCULAR; INTRAVENOUS; SUBCUTANEOUS at 22:05

## 2018-03-31 RX ADMIN — ARFORMOTEROL TARTRATE 15 MCG: 15 SOLUTION RESPIRATORY (INHALATION) at 21:38

## 2018-03-31 RX ADMIN — CARVEDILOL 12.5 MG: 12.5 TABLET, FILM COATED ORAL at 17:51

## 2018-03-31 RX ADMIN — GEMFIBROZIL 300 MG: 600 TABLET ORAL at 08:28

## 2018-03-31 RX ADMIN — HYDROMORPHONE HYDROCHLORIDE 0.5 MG: 2 INJECTION INTRAMUSCULAR; INTRAVENOUS; SUBCUTANEOUS at 00:45

## 2018-03-31 RX ADMIN — HYDROMORPHONE HYDROCHLORIDE 0.5 MG: 2 INJECTION INTRAMUSCULAR; INTRAVENOUS; SUBCUTANEOUS at 03:49

## 2018-03-31 RX ADMIN — APIXABAN 5 MG: 5 TABLET, FILM COATED ORAL at 17:51

## 2018-03-31 RX ADMIN — BUDESONIDE 500 MCG: 0.5 INHALANT RESPIRATORY (INHALATION) at 21:37

## 2018-03-31 RX ADMIN — SODIUM CHLORIDE 1 G: 900 INJECTION, SOLUTION INTRAVENOUS at 19:06

## 2018-03-31 RX ADMIN — SENNOSIDES 17.2 MG: 8.6 TABLET, FILM COATED ORAL at 22:05

## 2018-03-31 NOTE — PROGRESS NOTES
Problem: Falls - Risk of  Goal: *Absence of Falls  Document Alex Fall Risk and appropriate interventions in the flowsheet.    Outcome: Progressing Towards Goal  Fall Risk Interventions:  Mobility Interventions: Communicate number of staff needed for ambulation/transfer, OT consult for ADLs, Patient to call before getting OOB, PT Consult for mobility concerns, PT Consult for assist device competence, Strengthening exercises (ROM-active/passive), Utilize walker, cane, or other assitive device, Utilize gait belt for transfers/ambulation    Mentation Interventions: More frequent rounding    Medication Interventions: Patient to call before getting OOB, Teach patient to arise slowly, Utilize gait belt for transfers/ambulation    Elimination Interventions: Call light in reach, Elevated toilet seat, Patient to call for help with toileting needs, Toilet paper/wipes in reach, Toileting schedule/hourly rounds

## 2018-03-31 NOTE — PROGRESS NOTES
Patient name: Abdi Recinos  MRN: [id number]    Nephrology Progress note:    Assessment:  ESRD: on HD [facility] -  HD today    E. Coli Bacteremia (Hard copy placed in chart): source intra-abd source/ E. Coli peritonitis. IV zosyn. Repeat Bld Cx 3/11-> Neg. Recent hx of Candida peritonitis    HTN:    Hx of PE: on Eliquis    Lupus: Anemia 2 to ESRD/Lupus: Hgb remains below goal - on EPO    SHPT    Protein malnutrition         Subjective:  Ct to have some abd pain. Intermittent nausea.          Exam:  Visit Vitals    BP (!) 128/91 (BP 1 Location: Left arm, BP Patient Position: At rest)    Pulse 96    Temp 98.6 °F (37 °C)    Resp 16    Ht 5' 5\" (1.651 m)    Wt 68.5 kg (151 lb)    SpO2 97%    BMI 25.13 kg/m2     Wt Readings from Last 3 Encounters:   03/30/18 68.5 kg (151 lb)   03/05/18 68 kg (150 lb)   01/29/18 69.4 kg (153 lb)       Intake/Output Summary (Last 24 hours) at 03/31/18 0736  Last data filed at 03/31/18 0651   Gross per 24 hour   Intake                0 ml   Output              120 ml   Net             -120 ml       NAD  CTAB/l  RUE edema  Abd drain, mild TTP      Labs/Data:    Lab Results   Component Value Date/Time    WBC 3.6 03/30/2018 03:08 AM    Hemoglobin (POC) 7.8 (L) 01/19/2018 12:30 PM    HGB 7.9 (L) 03/30/2018 03:08 AM    Hematocrit (POC) 23 (L) 01/19/2018 12:30 PM    HCT 26.5 (L) 03/30/2018 03:08 AM    PLATELET 479 35/21/6705 03:08 AM    MCV 86.6 03/30/2018 03:08 AM       Lab Results   Component Value Date/Time    Sodium 138 03/30/2018 03:08 AM    Potassium 3.8 03/30/2018 03:08 AM    Chloride 105 03/30/2018 03:08 AM    CO2 27 03/30/2018 03:08 AM    Anion gap 6 03/30/2018 03:08 AM    Glucose 69 03/30/2018 03:08 AM    BUN 7 03/30/2018 03:08 AM    Creatinine 2.98 (H) 03/30/2018 03:08 AM    BUN/Creatinine ratio 2 (L) 03/30/2018 03:08 AM    GFR est AA 22 (L) 03/30/2018 03:08 AM    GFR est non-AA 18 (L) 03/30/2018 03:08 AM    Calcium 8.0 (L) 03/30/2018 03:08 AM

## 2018-03-31 NOTE — PROGRESS NOTES
Surgery Progress Note    Admit Date: 3/11/2018      Subjective:     Still with some mild abdominal pain. Did not have a BM yesterday but passing flatus. Drain examined by Radiology yesterday. It was left in place since it was draining and there are plans to upsize the drain tomorrow. Objective:     Patient Vitals for the past 8 hrs:   BP Temp Pulse Resp SpO2 Weight   03/31/18 0820 - - - - 98 % -   03/31/18 0818 (!) 138/102 98.3 °F (36.8 °C) 98 16 99 % 151 lb 2.6 oz (68.6 kg)   03/31/18 0407 (!) 128/91 98.6 °F (37 °C) 96 16 97 % -        Drain output 155 mL brown mostly thin liquid with some more solid debris. Physical Exam:    General: alert, cooperative, no distress, appears comfortable. Abdomen:  Soft, mild tenderness diffusely. Pigtail catheter in RLQ to gravity. Drain secured with tape. Drainage is light brown, has some particulate matter in the tube. CBC:   Lab Results   Component Value Date/Time    WBC 3.6 03/30/2018 03:08 AM    RBC 3.06 (L) 03/30/2018 03:08 AM    HGB 7.9 (L) 03/30/2018 03:08 AM    HCT 26.5 (L) 03/30/2018 03:08 AM    PLATELET 025 21/38/5978 03:08 AM     BMP:   Lab Results   Component Value Date/Time    Glucose 69 03/30/2018 03:08 AM    Sodium 138 03/30/2018 03:08 AM    Potassium 3.8 03/30/2018 03:08 AM    Chloride 105 03/30/2018 03:08 AM    CO2 27 03/30/2018 03:08 AM    BUN 7 03/30/2018 03:08 AM    Creatinine 2.98 (H) 03/30/2018 03:08 AM    Calcium 8.0 (L) 03/30/2018 03:08 AM     CMP:  Lab Results   Component Value Date/Time    Glucose 69 03/30/2018 03:08 AM    Sodium 138 03/30/2018 03:08 AM    Potassium 3.8 03/30/2018 03:08 AM    Chloride 105 03/30/2018 03:08 AM    CO2 27 03/30/2018 03:08 AM    BUN 7 03/30/2018 03:08 AM    Creatinine 2.98 (H) 03/30/2018 03:08 AM    Calcium 8.0 (L) 03/30/2018 03:08 AM    Anion gap 6 03/30/2018 03:08 AM    BUN/Creatinine ratio 2 (L) 03/30/2018 03:08 AM    Alk.  phosphatase 52 03/22/2018 04:03 AM    Protein, total 5.6 (L) 03/22/2018 04:03 AM    Albumin 1.0 (L) 03/22/2018 04:03 AM    Globulin 4.6 (H) 03/22/2018 04:03 AM    A-G Ratio 0.2 (L) 03/22/2018 04:03 AM           Assessment:   Patient Active Hospital Problem List:   Peritonitis ([facility] [address] (3/11/2018) secondary to infected ascites.             Plan:   - Continue on IV Abx per ID  - IR to reassess drain tomorrow and possibly upsize drain.      - Will continue to follow  - Continue care per primary team    Joyce Tom MD  3/31/2018  9:29 AM

## 2018-03-31 NOTE — PROGRESS NOTES
Radiology  Was asked to evaluate abdominal drain for having been pulled partially out. Drain placed into massive loculated ascites collection on March 14th. Follow-up CTs on March 17th, 23rd and 30th have demonstrated minimal interval progress after initial decrease documented on post Ct March 17th. Drain seems to still be in and working. On physical exam I am pretty sure unchanged from yesterday but catheter pulled back on yesterdays CT relative to position on March 23rd. Catheter is draining now and will have it redressed and succured with dressing in place. She may benefit early next week with a tube upsize under fluoro followed by vigorous irrigation. She may need accord vincent drain after that to keep cavity collapsed. Can discuss various options on Monday when regular care givers all present.

## 2018-03-31 NOTE — PROGRESS NOTES
Hospitalist Progress Note  Marjean Nissen, MD  Answering service: [phone] from in house phone  Cell: [phone]         Date of Service:  3/31/2018  NAME:  Benjamin Dear  :  1985  MRN:  [id number]  Admission Summary: This is a 69-year-old female with multiple medical problems including SLE, end-stage renal disease (on hemodialysis, TTS), pulmonary embolism, on Eliquis (), hyperlipidemia, hypertension, recent Candida peritonitis, chronic bilateral pain.  She comes over here because of abdominal pain since last 4 or 5 days.               Interval history / Subjective:     3/20:   Pt yet with abd pain but better, no n.v,  Had HD today,   - low albumin met with poor po intake. Has supplements. 3/21:  A bit more swollen , cont with lower abd pain, but eating ( little ) no new fever spikes. 3/22:  Less swollen post HD,  Yet with pain lower abd, yet eats poorly, abd exam benign, drain in     3/23:  Pt with cont pain, seems real , discussed case with RN;  Also discussed case with pt, the decision is to do another CT of abd/pelvis  to see if we can come upon some understanding of why she is experiencing such continued pain that is requiring routine dilaudid as hers is ,  It is noted that the oral oxy does not seem to work as well as the iv dilaudid , may consider asking palliative medicine to participate in this pain management     3/24:  CT from yesterday noting cont collection of fluid low and abd not changed since 3/17 cT. Will ask surg to see if they have options to help. Pt remains in pain.      3/25:  Radiology has reviewed films and US of abd and feel that the lt and rt crescent shaped fluid collection communicates well and no further intervention needed. ;plus no drain able abscess/fluid collection noted on US on left abdomen    3/26:  Not eating much, had good night, no new issues.            Assessment & Plan:    Acute peritonitis  -recent candida peritonitis  -CT abd 3/11 Large amount of free intraperitoneal fluid appears somewhat loculated  -Follow cultures, blood culture 3/11 unremarkable. Peritoneal fluid heavy E coli  -Antibiotics per ID  -Was on Vanc/Zosyn/Fluconazole. Now Vanc/FLuconazole discontinued. Continue Cefepime  -s/p paracentesis  - Repeat CT on 3/17 shows improvement on loculated fluid. Repeat fluid culture and cell count ordered by nephro on 3/17. C/s from 3/18 positive for ecoli , continue cefepime   Off imuran now  -Repeat CT abd 3/23 The crescentic peritoneal collection extending from right to left in the anterior abdomen is again noted. It is relatively unchanged in size and extent compared to the previous study. The drainage end of the pigtail catheter remains in the right side of the collection. Trace ascites is noted  -US 3/25 no drain able fluid lt abdomen  -The patient might need more drainage of abdominal fluid, not sure if the catheter would be able to drain all of it, now. ?Surgery vs prolonged antibiotics  -Appreciate discussion with ID  -repeat CT abd 3/30 Stable peritoneal fluid collection containing a percutaneous drain  -Appreciate discussion with Dr Little Cantu and Dr Anuj Snowden. Continue IV antibiotics till 4/6/2018, IR to reassess drain  -If drain      E coli bacteremia/Sepsis   that the patient was found to have E coli bacteremia at HD      Hypotension now with HTN  -Takes coreg 12.5 bid at home, now on 6.25,will increase as tolerated. -now resolved  -Was on stress steroids  -Now on regular oral steroids    Thrombocytopenia   -Monitor closely, improving  -Switched Zosyn to cefepime. Stable     Bilateral pedal edema  -Dopplers negative for DVT  - mild edema 3/20/2018      SLE  -continue home meds  -D/C'd Imuran, allopurinol by nephro with acute infection and thrombocytopenia.      History of PE  -on Eliquis since 2012  See note from previous hosptalist  -Appreciate discussion with PMD 3/13. The patient was seen by hematology in 2013 but seems was lost to follow up. I think at that time the plan was to stop anticoagulation if her SLE is stable. Spoke to Dr Rubina Carreon, he feels that the patient can come off Eliquis. His records do not suggest any recurrence of DVT/PE. Spoke to Dr Moises Almendarez, he has not seen the patient since >1 yr. If antiphospholipid antibodies negative then the patient can come off Eliquis  -10/17 V/Q high probability for PE  -Per oncology the patient should be continued to Eliquis indefinitely      ESRD  -on HD TTS     Anemia  -of chronic disease  -Transfused 2 units PRBC with HD 3/15    Severe hepatic steatosis  -monitor     HTN  -stable     Severe protein calorie malnutrition  -Consult nutrition  -nutritional supp     Appreciate Nephro, ID, surgery input.     Renal diet     Code status: FULL CODE  DVT prophylaxis: Eliquis  PTA: home     Follow ID, nephrology, surgery     Care Plan discussed with: Patient/Family  Disposition: Home w/Family          Hospital Problems  Date Reviewed: 3/11/2018          Codes Class Noted POA    * (Principal)Peritonitis ([facility] [address] ICD-10-CM: K65.9  ICD-9-CM: 567.9  3/11/2018 Yes                Review of Systems:   Stable ROS Yet wit pain but management med working. No n/v but poor apetite , no cp no sob     Vital Signs:    Last 24hrs VS reviewed since prior progress note. Most recent are:  Visit Vitals    BP (!) 138/102 (BP 1 Location: Left arm, BP Patient Position: At rest)    Pulse 98    Temp 98.3 °F (36.8 °C)    Resp 16    Ht 5' 5\" (1.651 m)    Wt 68.6 kg (151 lb 2.6 oz)    SpO2 98%    BMI 25.15 kg/m2         Intake/Output Summary (Last 24 hours) at 03/31/18 1058  Last data filed at 03/31/18 0651   Gross per 24 hour   Intake                0 ml   Output              120 ml   Net             -120 ml        Physical Examination:             Constitutional:  No acute distress, cooperative, pleasant    ENT:  Oral mucous moist, oropharynx benign.  Neck supple,    Resp:  CTA bilaterally. No wheezing/rhonchi/rales. No accessory muscle use   CV:  Regular rhythm, normal rate, no murmurs, gallops, rubs    GI:  Soft, non distended, mild lower abd tender. normoactive bowel sounds, no hepatosplenomegaly right abd drain in place    Musculoskeletal:  No edema, warm, 2+ pulses throughout    Neurologic:  Moves all extremities. AAOx3, CN II-XII reviewed     Psych:  Good insight, Not anxious nor agitated. Skin:  Good turgor, no rashes or ulcers       Data Review:    Review and/or order of clinical lab test      Labs:     Recent Labs      03/30/18 0308 03/29/18 0437   WBC  3.6  3.3*   HGB  7.9*  9.4*   HCT  26.5*  31.2*   PLT  164  169     Recent Labs      03/30/18 0308 03/29/18 0437   NA  138  138   K  3.8  4.0   CL  105  105   CO2  27  28   BUN  7  11   CREA  2.98*  4.23*   GLU  69  82   CA  8.0*  8.3*     No results for input(s): SGOT, GPT, ALT, AP, TBIL, TBILI, TP, ALB, GLOB, GGT, AML, LPSE in the last 72 hours. No lab exists for component: AMYP, HLPSE  No results for input(s): INR, PTP, APTT in the last 72 hours. No lab exists for component: INREXT, INREXT   No results for input(s): FE, TIBC, PSAT, FERR in the last 72 hours. Lab Results   Component Value Date/Time    Folate 4.1 (L) 03/15/2018 10:32 AM      No results for input(s): PH, PCO2, PO2 in the last 72 hours. No results for input(s): CPK, CKNDX, TROIQ in the last 72 hours.     No lab exists for component: CPKMB  Lab Results   Component Value Date/Time    Cholesterol, total 117 09/25/2015 02:02 PM    HDL Cholesterol 31 (L) 09/25/2015 02:02 PM    LDL, calculated 57 09/25/2015 02:02 PM    Triglyceride 146 09/25/2015 02:02 PM    CHOL/HDL Ratio 7.7 (H) 05/31/2009 10:30 AM     Lab Results   Component Value Date/Time    Glucose (POC) 96 03/31/2018 06:35 AM    Glucose (POC) 74 03/31/2018 06:11 AM    Glucose (POC) 94 03/30/2018 09:48 PM    Glucose (POC) 83 03/30/2018 05:12 PM    Glucose (POC) 130 (H) 03/30/2018 12:33 PM     Lab Results   Component Value Date/Time    Color YELLOW/STRAW 08/12/2016 09:06 PM    Appearance CLEAR 08/12/2016 09:06 PM    Specific gravity 1.017 08/12/2016 09:06 PM    Specific gravity 1.010 07/11/2016 12:44 PM    pH (UA) 7.0 08/12/2016 09:06 PM    Protein 300 (A) 08/12/2016 09:06 PM    Glucose NEGATIVE  08/12/2016 09:06 PM    Ketone TRACE (A) 08/12/2016 09:06 PM    Bilirubin NEGATIVE  07/11/2016 12:44 PM    Urobilinogen 1.0 08/12/2016 09:06 PM    Nitrites NEGATIVE  08/12/2016 09:06 PM    Leukocyte Esterase NEGATIVE  08/12/2016 09:06 PM    Epithelial cells MODERATE (A) 08/12/2016 09:06 PM    Bacteria 1+ (A) 08/12/2016 09:06 PM    WBC 0-4 08/12/2016 09:06 PM    RBC 0-5 08/12/2016 09:06 PM         Medications Reviewed:     Current Facility-Administered Medications   Medication Dose Route Frequency    insulin lispro (HUMALOG) injection   SubCUTAneous AC&HS    senna (SENOKOT) tablet 17.2 mg  2 Tab Oral QHS    balsam peru-castor oil (VENELEX)  mg/gram ointment   Topical Q8H    amLODIPine (NORVASC) tablet 5 mg  5 mg Oral DAILY    carvedilol (COREG) tablet 12.5 mg  12.5 mg Oral BID WITH MEALS    dextrose 5% and 0.9% NaCl infusion  50 mL/hr IntraVENous CONTINUOUS    hydrALAZINE (APRESOLINE) 20 mg/mL injection 5 mg  5 mg IntraVENous Q6H PRN    cefepime (MAXIPIME) 1 g in 0.9% sodium chloride (MBP/ADV) 50 mL ADV  1 g IntraVENous Q24H    apixaban (ELIQUIS) tablet 5 mg  5 mg Oral BID    0.9% sodium chloride infusion 250 mL  250 mL IntraVENous PRN    HYDROmorphone (DILAUDID) injection 0.5 mg  0.5 mg IntraVENous Q3H PRN    glucose chewable tablet 16 g  4 Tab Oral PRN    dextrose (D50W) injection syrg 12.5-25 g  12.5-25 g IntraVENous PRN    glucagon (GLUCAGEN) injection 1 mg  1 mg IntraMUSCular PRN    epoetin pia (EPOGEN;PROCRIT) 14,000 Units  14,000 Units SubCUTAneous DIALYSIS TUE, THU & SAT    predniSONE (DELTASONE) tablet 5 mg  5 mg Oral DAILY    pantoprazole (PROTONIX) tablet 40 mg  40 mg Oral ACB    cyanocobalamin (VITAMIN B12) tablet 2,500 mcg  2,500 mcg Oral DAILY    hydroxychloroquine (PLAQUENIL) tablet 200 mg  200 mg Oral BID    albuterol (PROVENTIL VENTOLIN) nebulizer solution 2.5 mg  2.5 mg Nebulization Q4H PRN    gemfibrozil (LOPID) tablet 300 mg  300 mg Oral DAILY    oxyCODONE IR (ROXICODONE) tablet 2.5-5 mg  2.5-5 mg Oral Q6H PRN    amitriptyline (ELAVIL) tablet 25 mg  25 mg Oral QHS    sodium chloride (NS) flush 5-10 mL  5-10 mL IntraVENous Q8H    sodium chloride (NS) flush 5-10 mL  5-10 mL IntraVENous PRN    ondansetron (ZOFRAN) injection 4 mg  4 mg IntraVENous Q4H PRN    arformoterol (BROVANA) neb solution 15 mcg  15 mcg Nebulization BID RT    And    budesonide (PULMICORT) 500 mcg/2 ml nebulizer suspension  500 mcg Nebulization BID RT     ______________________________________________________________________  EXPECTED LENGTH OF STAY: 4d 21h  ACTUAL LENGTH OF STAY:          20                 Ivana Munguia MD

## 2018-03-31 NOTE — PROCEDURES
[facility]  [phone]    Vitals   Pre   Post   Assessment   Pre   Post     Temp  Temp: 99 °F (37.2 °C) (03/31/18 1415)  98.3 oral LOC  A&Ox4 same   HR   Pulse (Heart Rate): 96 (03/31/18 1415) 102 Lungs   Coarse anterior, diminished posterior same   B/P   BP: 126/82 (03/31/18 1415) 129/88 Cardiac   HRR, S1, S2 present S1, S2 present    Resp   Resp Rate: 16 (03/31/18 1415) 16 Skin   CDI, patient has abdominal drain in RLQ same    Pain level  Pain Intensity 1: 9 (03/31/18 1301) 7 Edema  2+ RUE, 1+BLE, nonpitting lower back and abdomen     same   Orders:    Duration:   Start:   14:19 End:    17:20 Total:   3 h   Dialyzer:   Dialyzer/Set Up Inspection: Clint Bowie (03/31/18 1415)   K Bath:   Dialysate K (mEq/L): 3 (03/31/18 1415)   Ca Bath:   Dialysate CA (mEq/L): 2.5 (03/31/18 1415)   Na/Bicarb:   Dialysate NA (mEq/L): 140 (03/31/18 1415)   Target Fluid Removal:   Goal/Amount of Fluid to Remove (mL): 1500 mL (03/31/18 1415)   Access     Type & Location:   PETER AVG: skin CDI. No s/s of infection. No issues with cannulation or hemostasis. Running well at .    Labs     Obtained/Reviewed   Critical Results Called   Date when labs were drawn-  Hgb-    HGB   Date Value Ref Range Status   03/30/2018 7.9 (L) 11.5 - 16.0 g/dL Final     K-    Potassium   Date Value Ref Range Status   03/30/2018 3.8 3.5 - 5.1 mmol/L Final     Ca-   Calcium   Date Value Ref Range Status   03/30/2018 8.0 (L) 8.5 - 10.1 MG/DL Final     Bun-   BUN   Date Value Ref Range Status   03/30/2018 7 6 - 20 MG/DL Final     Creat-   Creatinine   Date Value Ref Range Status   03/30/2018 2.98 (H) 0.55 - 1.02 MG/DL Final     Comment:     INVESTIGATED PER DELTA CHECK PROTOCOL        Medications/ Blood Products Given     Name   Dose   Route and Time     none                Blood Volume Processed (BVP):   77.4 L Net Fluid   Removed:  1500 ml   Comments   Time Out Done: 14:15  Primary Nurse Rpt Pre: Rancho Garcia RN  Primary Nurse Rpt Post: Kate Prado RN  Pt Education: procedural, accessing graft and need to maintain access  Care Plan: ongoing  Tx Summary:   Received SBAR from primary RN Krystle Mayer RN. Consent signed and on file. Arrived to patient room set up machine and tested water and machine. 14:19 Pt cannulated with 29N needles per policy & without issue. Labs drawn per request/ order. VSS. Dialysis Tx initiated. 15:00 Pt resting quietly. VSS  16:00- Pt resting quietly. VSS  16:17- Patient complaining of pain. Primary RN gave 0.5 mg of Dilaudid IV  17:20 Tx ended. Rinsed back all possible blood. Access visualized throughout all treatment. VSS. Hemostasis achieved without issue. SBAR given to Primary, RN Kate Prado RN. Admiting Diagnosis: Peritonitis  [facility]  Consent signed - Informed Consent Verified: Yes (03/31/18 1415)  [facility] Consent - on file  Hepatitis Status- negative, drawn 03/31/18  Machine #- Machine Number: [id number] (03/31/18 1415)  Telemetry status- n/a  Pre-dialysis wt. - Pre-Dialysis Weight: 67.7 kg (149 lb 4 oz) (03/29/18 2264)

## 2018-04-01 LAB
BACTERIA SPEC CULT: NORMAL
BACTERIA SPEC CULT: NORMAL
GLUCOSE BLD STRIP.AUTO-MCNC: 101 MG/DL (ref 65–100)
GLUCOSE BLD STRIP.AUTO-MCNC: 84 MG/DL (ref 65–100)
GLUCOSE BLD STRIP.AUTO-MCNC: 85 MG/DL (ref 65–100)
GLUCOSE BLD STRIP.AUTO-MCNC: 96 MG/DL (ref 65–100)
GRAM STN SPEC: NORMAL
GRAM STN SPEC: NORMAL
SERVICE CMNT-IMP: ABNORMAL
SERVICE CMNT-IMP: NORMAL

## 2018-04-01 PROCEDURE — 94640 AIRWAY INHALATION TREATMENT: CPT

## 2018-04-01 PROCEDURE — 74011636637 HC RX REV CODE- 636/637: Performed by: HOSPITALIST

## 2018-04-01 PROCEDURE — 74011000258 HC RX REV CODE- 258: Performed by: INTERNAL MEDICINE

## 2018-04-01 PROCEDURE — 74011250636 HC RX REV CODE- 250/636: Performed by: HOSPITALIST

## 2018-04-01 PROCEDURE — 74011250636 HC RX REV CODE- 250/636: Performed by: INTERNAL MEDICINE

## 2018-04-01 PROCEDURE — 82962 GLUCOSE BLOOD TEST: CPT

## 2018-04-01 PROCEDURE — 74011250637 HC RX REV CODE- 250/637: Performed by: INTERNAL MEDICINE

## 2018-04-01 PROCEDURE — 74011250637 HC RX REV CODE- 250/637: Performed by: HOSPITALIST

## 2018-04-01 PROCEDURE — 65270000029 HC RM PRIVATE

## 2018-04-01 PROCEDURE — 74011000250 HC RX REV CODE- 250: Performed by: HOSPITALIST

## 2018-04-01 PROCEDURE — 74011250637 HC RX REV CODE- 250/637: Performed by: NURSE PRACTITIONER

## 2018-04-01 RX ADMIN — HYDROMORPHONE HYDROCHLORIDE 0.5 MG: 2 INJECTION INTRAMUSCULAR; INTRAVENOUS; SUBCUTANEOUS at 13:09

## 2018-04-01 RX ADMIN — HYDROMORPHONE HYDROCHLORIDE 0.5 MG: 2 INJECTION INTRAMUSCULAR; INTRAVENOUS; SUBCUTANEOUS at 01:17

## 2018-04-01 RX ADMIN — AMLODIPINE BESYLATE 5 MG: 5 TABLET ORAL at 08:53

## 2018-04-01 RX ADMIN — HYDROXYCHLOROQUINE SULFATE 200 MG: 200 TABLET, FILM COATED ORAL at 18:00

## 2018-04-01 RX ADMIN — Medication 10 ML: at 07:25

## 2018-04-01 RX ADMIN — GEMFIBROZIL 300 MG: 600 TABLET ORAL at 08:53

## 2018-04-01 RX ADMIN — HYDROMORPHONE HYDROCHLORIDE 0.5 MG: 2 INJECTION INTRAMUSCULAR; INTRAVENOUS; SUBCUTANEOUS at 04:18

## 2018-04-01 RX ADMIN — CARVEDILOL 12.5 MG: 12.5 TABLET, FILM COATED ORAL at 08:53

## 2018-04-01 RX ADMIN — HYDROMORPHONE HYDROCHLORIDE 0.5 MG: 2 INJECTION INTRAMUSCULAR; INTRAVENOUS; SUBCUTANEOUS at 10:22

## 2018-04-01 RX ADMIN — Medication 2500 MCG: at 08:53

## 2018-04-01 RX ADMIN — AMITRIPTYLINE HYDROCHLORIDE 25 MG: 10 TABLET, FILM COATED ORAL at 22:13

## 2018-04-01 RX ADMIN — HYDROMORPHONE HYDROCHLORIDE 0.5 MG: 2 INJECTION INTRAMUSCULAR; INTRAVENOUS; SUBCUTANEOUS at 16:12

## 2018-04-01 RX ADMIN — SODIUM CHLORIDE 1 G: 900 INJECTION, SOLUTION INTRAVENOUS at 19:39

## 2018-04-01 RX ADMIN — CARVEDILOL 12.5 MG: 12.5 TABLET, FILM COATED ORAL at 16:12

## 2018-04-01 RX ADMIN — CASTOR OIL AND BALSAM, PERU: 788; 87 OINTMENT TOPICAL at 16:12

## 2018-04-01 RX ADMIN — HYDROMORPHONE HYDROCHLORIDE 0.5 MG: 2 INJECTION INTRAMUSCULAR; INTRAVENOUS; SUBCUTANEOUS at 07:24

## 2018-04-01 RX ADMIN — PANTOPRAZOLE SODIUM 40 MG: 40 TABLET, DELAYED RELEASE ORAL at 07:24

## 2018-04-01 RX ADMIN — Medication 10 ML: at 16:12

## 2018-04-01 RX ADMIN — APIXABAN 5 MG: 5 TABLET, FILM COATED ORAL at 18:00

## 2018-04-01 RX ADMIN — CASTOR OIL AND BALSAM, PERU: 788; 87 OINTMENT TOPICAL at 02:00

## 2018-04-01 RX ADMIN — SENNOSIDES 17.2 MG: 8.6 TABLET, FILM COATED ORAL at 22:13

## 2018-04-01 RX ADMIN — ARFORMOTEROL TARTRATE 15 MCG: 15 SOLUTION RESPIRATORY (INHALATION) at 21:39

## 2018-04-01 RX ADMIN — APIXABAN 5 MG: 5 TABLET, FILM COATED ORAL at 08:53

## 2018-04-01 RX ADMIN — HYDROMORPHONE HYDROCHLORIDE 0.5 MG: 2 INJECTION INTRAMUSCULAR; INTRAVENOUS; SUBCUTANEOUS at 19:39

## 2018-04-01 RX ADMIN — BUDESONIDE 500 MCG: 0.5 INHALANT RESPIRATORY (INHALATION) at 08:22

## 2018-04-01 RX ADMIN — CASTOR OIL AND BALSAM, PERU: 788; 87 OINTMENT TOPICAL at 22:15

## 2018-04-01 RX ADMIN — BUDESONIDE 500 MCG: 0.5 INHALANT RESPIRATORY (INHALATION) at 21:39

## 2018-04-01 RX ADMIN — PREDNISONE 5 MG: 5 TABLET ORAL at 08:53

## 2018-04-01 RX ADMIN — ARFORMOTEROL TARTRATE 15 MCG: 15 SOLUTION RESPIRATORY (INHALATION) at 08:22

## 2018-04-01 RX ADMIN — HYDROMORPHONE HYDROCHLORIDE 0.5 MG: 2 INJECTION INTRAMUSCULAR; INTRAVENOUS; SUBCUTANEOUS at 22:14

## 2018-04-01 RX ADMIN — HYDROXYCHLOROQUINE SULFATE 200 MG: 200 TABLET, FILM COATED ORAL at 08:53

## 2018-04-01 NOTE — PROGRESS NOTES
Patient name: Deb Hearn  MRN: [id number]    Nephrology Progress note:    Assessment:  ESRD: on HD TTS - [facility] -  HD yesterday    E. Coli Bacteremia (Hard copy placed in chart): source intra-abd source/ E. Coli peritonitis. IV zosyn. Repeat Bld Cx 3/11-> Neg. Recent hx of Candida peritonitis    HTN:    Hx of PE: on Eliquis    Lupus: Anemia 2 to ESRD/Lupus: Hgb remains below goal - on EPO    SHPT    Protein malnutrition    Hypoglycemia: wean D5NS fluids to 25cc/hr         Subjective:  Drain adjusted by IR yesterday.           Exam:  Visit Vitals    BP (!) 121/91 (BP 1 Location: Left arm)    Pulse 94    Temp 98.3 °F (36.8 °C)    Resp 16    Ht 5' 5\" (1.651 m)    Wt 69.3 kg (152 lb 12.8 oz)    SpO2 94%    BMI 25.43 kg/m2     Wt Readings from Last 3 Encounters:   04/01/18 69.3 kg (152 lb 12.8 oz)   03/05/18 68 kg (150 lb)   01/29/18 69.4 kg (153 lb)       Intake/Output Summary (Last 24 hours) at 04/01/18 0732  Last data filed at 04/01/18 0527   Gross per 24 hour   Intake                0 ml   Output             1655 ml   Net            -1655 ml       NAD  CTAB/l  RUE edema  Abd drain, mild TTP      Labs/Data:    Lab Results   Component Value Date/Time    WBC 3.6 03/30/2018 03:08 AM    Hemoglobin (POC) 7.8 (L) 01/19/2018 12:30 PM    HGB 7.9 (L) 03/30/2018 03:08 AM    Hematocrit (POC) 23 (L) 01/19/2018 12:30 PM    HCT 26.5 (L) 03/30/2018 03:08 AM    PLATELET 120 81/65/1257 03:08 AM    MCV 86.6 03/30/2018 03:08 AM       Lab Results   Component Value Date/Time Sodium 138 03/30/2018 03:08 AM    Potassium 3.8 03/30/2018 03:08 AM    Chloride 105 03/30/2018 03:08 AM    CO2 27 03/30/2018 03:08 AM    Anion gap 6 03/30/2018 03:08 AM    Glucose 69 03/30/2018 03:08 AM    BUN 7 03/30/2018 03:08 AM    Creatinine 2.98 (H) 03/30/2018 03:08 AM    BUN/Creatinine ratio 2 (L) 03/30/2018 03:08 AM    GFR est AA 22 (L) 03/30/2018 03:08 AM    GFR est non-AA 18 (L) 03/30/2018 03:08 AM    Calcium 8.0 (L) 03/30/2018 03:08 AM        Discussed with patient/family

## 2018-04-01 NOTE — PROGRESS NOTES
Hospitalist Progress Note            This is a 27-year-old female with multiple medical problems including SLE, end-stage renal disease (on hemodialysis, TTS), pulmonary embolism, on Eliquis (2012), hyperlipidemia, hypertension, recent Candida peritonitis, chronic bilateral pain.  She comes over here because of abdominal pain since last 4 or 5 days. Daily Progress Note: 4/1/2018    Assessment/Plan:   Acute peritonitis (e coli) with sepsis  - also with recent candida peritonitis  - CT abd 3/11 Large amount of free intraperitoneal fluid appears somewhat loculated  - Follow cultures, blood culture 3/11 unremarkable. Peritoneal fluid heavy E coli  - Antibiotics per ID  - continue Cefepime (previously on Vanc/ Zosyn/ voriconazole)  - s/p paracentesis  - Repeat CT on 3/17 shows improvement on loculated fluid.  Repeat fluid culture and cell count ordered by nephro on 3/17. C/s from 3/18 positive for ecoli , continue cefepime   Off imuran now  - Repeat CT abd 3/23 The crescentic peritoneal collection extending from right to left in the anterior abdomen is again noted. It is relatively unchanged in size and extent compared to the previous study. The drainage end of the pigtail catheter remains in the right side of the collection. Trace ascites is noted  - US 3/25 no drain able fluid lt abdomen  - The patient might need more drainage of abdominal fluid, not sure if the catheter would be able to drain all of it, now. ?Surgery vs prolonged antibiotics  - Appreciate discussion with ID  - repeat CT abd 3/30 Stable peritoneal fluid collection containing a percutaneous drain  - Appreciate discussion with Dr Fred Carmona and Dr Alfonso Watkins. Continue IV antibiotics till 4/6/2018, IR to reassess drain       Hypotension now with HTN  -Takes coreg 12.5 bid at home, now on 6.25,will increase as tolerated.   -now resolved  -Was on stress steroids  -Now on regular oral steroids     Thrombocytopenia   -Monitor closely, improving  -Switched Zosyn to cefepime. Stable      Bilateral pedal edema  -Dopplers negative for DVT  - mild edema 3/20/2018       SLE  -continue home meds  -D/C'd Imuran, allopurinol by nephro with acute infection and thrombocytopenia.       History of PE  - on Eliquis since   See note from previous hosptalist  - Appreciate discussion with PMD 3/13. The patient was seen by hematology in  but seems was lost to follow up. I think at that time the plan was to stop anticoagulation if her SLE is stable. Spoke to Dr Suri Bennett, he feels that the patient can come off Eliquis. His records do not suggest any recurrence of DVT/PE. Spoke to Dr Juvenal Ventura, he has not seen the patient since >1 yr. If antiphospholipid antibodies negative then the patient can come off Eliquis  - 10/17 V/Q high probability for PE  - Per oncology the patient should be continued to Eliquis indefinitely       ESRD  -on HD TTS      Anemia  -of chronic disease  -Transfused 2 units PRBC with HD 3/15     Severe hepatic steatosis  -monitor      HTN  -stable      Severe protein calorie malnutrition  -Consult nutrition  -nutritional supp        DISPO: home once better       Subjective:   Feels OK, but abd still hurts. Overnight events discussed with nursing. Review of Systems:   No CP, no SOB. Objective:     Visit Vitals    /78    Pulse 98    Temp 98.5 °F (36.9 °C)    Resp 14    Ht 5' 5\" (1.651 m)    Wt 69.3 kg (152 lb 12.8 oz)    SpO2 94%    BMI 25.43 kg/m2      O2 Device: Room air    Temp (24hrs), Av.4 °F (36.9 °C), Min:97.8 °F (36.6 °C), Max:98.8 °F (37.1 °C)            1901 -  0700  In: -   Out: 9299 [Drains:275]    EXAM:  General: WD, WN. Alert, cooperative, no acute distress    HEENT: NC, atraumatic. PERRL, EOMI. Anicteric sclerae. Neck:   Supple, trachea midline  Lungs:  CTA bilaterally. No Wheezing/Rhonchi/Rales. Heart:  Regular rhythm,  No murmur (), No Rubs, No Gallops  Abdomen: Distended.  Paracentesis drain in place.  +Bowel sounds, no HSM  Extremities: No c/c/e  Neurologic:  CN 2-12 gi, Alert and oriented X 3. No acute neurological distress   Psych:   Fair insight. Not anxious nor agitated.         Data Review:     Recent Results (from the past 24 hour(s))   GLUCOSE, POC    Collection Time: 03/31/18  9:18 PM   Result Value Ref Range    Glucose (POC) 125 (H) 65 - 100 mg/dL    Performed by Christel Orellana (PCT)    GLUCOSE, POC    Collection Time: 04/01/18  6:42 AM   Result Value Ref Range    Glucose (POC) 85 65 - 100 mg/dL    Performed by Shin LOPEZ    GLUCOSE, POC    Collection Time: 04/01/18 12:46 PM   Result Value Ref Range    Glucose (POC) 101 (H) 65 - 100 mg/dL    Performed by Zayda Lechuga    GLUCOSE, POC    Collection Time: 04/01/18  4:17 PM   Result Value Ref Range    Glucose (POC) 84 65 - 100 mg/dL    Performed by Zayda Lechuga        Principal Problem:    Peritonitis ([address] (3/11/2018)        Medications reviewed  Current Facility-Administered Medications   Medication Dose Route Frequency    insulin lispro (HUMALOG) injection   SubCUTAneous AC&HS    senna (SENOKOT) tablet 17.2 mg  2 Tab Oral QHS    balsam peru-castor oil (VENELEX)  mg/gram ointment   Topical Q8H    amLODIPine (NORVASC) tablet 5 mg  5 mg Oral DAILY    carvedilol (COREG) tablet 12.5 mg  12.5 mg Oral BID WITH MEALS    dextrose 5% and 0.9% NaCl infusion  25 mL/hr IntraVENous CONTINUOUS    hydrALAZINE (APRESOLINE) 20 mg/mL injection 5 mg  5 mg IntraVENous Q6H PRN    cefepime (MAXIPIME) 1 g in 0.9% sodium chloride (MBP/ADV) 50 mL ADV  1 g IntraVENous Q24H    apixaban (ELIQUIS) tablet 5 mg  5 mg Oral BID    0.9% sodium chloride infusion 250 mL  250 mL IntraVENous PRN    HYDROmorphone (DILAUDID) injection 0.5 mg  0.5 mg IntraVENous Q3H PRN    glucose chewable tablet 16 g  4 Tab Oral PRN    dextrose (D50W) injection syrg 12.5-25 g  12.5-25 g IntraVENous PRN    glucagon (GLUCAGEN) injection 1 mg  1 mg IntraMUSCular PRN    epoetin pia (EPOGEN;PROCRIT) 14,000 Units  14,000 Units SubCUTAneous DIALYSIS TUE, THU & SAT    predniSONE (DELTASONE) tablet 5 mg  5 mg Oral DAILY    pantoprazole (PROTONIX) tablet 40 mg  40 mg Oral ACB    cyanocobalamin (VITAMIN B12) tablet 2,500 mcg  2,500 mcg Oral DAILY    hydroxychloroquine (PLAQUENIL) tablet 200 mg  200 mg Oral BID    albuterol (PROVENTIL VENTOLIN) nebulizer solution 2.5 mg  2.5 mg Nebulization Q4H PRN    gemfibrozil (LOPID) tablet 300 mg  300 mg Oral DAILY    oxyCODONE IR (ROXICODONE) tablet 2.5-5 mg  2.5-5 mg Oral Q6H PRN    amitriptyline (ELAVIL) tablet 25 mg  25 mg Oral QHS    sodium chloride (NS) flush 5-10 mL  5-10 mL IntraVENous Q8H    sodium chloride (NS) flush 5-10 mL  5-10 mL IntraVENous PRN    ondansetron (ZOFRAN) injection 4 mg  4 mg IntraVENous Q4H PRN    arformoterol (BROVANA) neb solution 15 mcg  15 mcg Nebulization BID RT    And    budesonide (PULMICORT) 500 mcg/2 ml nebulizer suspension  500 mcg Nebulization BID RT       DVT prophylaxis: on Eliquis    Total time spent with patient: 20 minutes    Kalyan Mo MD

## 2018-04-02 LAB
ALBUMIN SERPL-MCNC: 1.1 G/DL (ref 3.5–5)
ALBUMIN/GLOB SERPL: 0.2 {RATIO} (ref 1.1–2.2)
ALP SERPL-CCNC: 112 U/L (ref 45–117)
ALT SERPL-CCNC: 7 U/L (ref 12–78)
ANION GAP SERPL CALC-SCNC: 7 MMOL/L (ref 5–15)
AST SERPL-CCNC: 10 U/L (ref 15–37)
BASOPHILS # BLD: 0 K/UL (ref 0–0.1)
BASOPHILS NFR BLD: 1 % (ref 0–1)
BILIRUB SERPL-MCNC: 0.2 MG/DL (ref 0.2–1)
BUN SERPL-MCNC: 9 MG/DL (ref 6–20)
BUN/CREAT SERPL: 2 (ref 12–20)
CALCIUM SERPL-MCNC: 7.9 MG/DL (ref 8.5–10.1)
CHLORIDE SERPL-SCNC: 105 MMOL/L (ref 97–108)
CO2 SERPL-SCNC: 28 MMOL/L (ref 21–32)
CREAT SERPL-MCNC: 3.72 MG/DL (ref 0.55–1.02)
DIFFERENTIAL METHOD BLD: NORMAL
EOSINOPHIL # BLD: 0.1 K/UL (ref 0–0.4)
EOSINOPHIL NFR BLD: 2 % (ref 0–7)
ERYTHROCYTE [DISTWIDTH] IN BLOOD BY AUTOMATED COUNT: 21.6 % (ref 11.5–14.5)
GLOBULIN SER CALC-MCNC: 4.6 G/DL (ref 2–4)
GLUCOSE BLD STRIP.AUTO-MCNC: 82 MG/DL (ref 65–100)
GLUCOSE BLD STRIP.AUTO-MCNC: 91 MG/DL (ref 65–100)
GLUCOSE BLD STRIP.AUTO-MCNC: 94 MG/DL (ref 65–100)
GLUCOSE SERPL-MCNC: 72 MG/DL (ref 65–100)
HBV SURFACE AB SER QL: NONREACTIVE
HBV SURFACE AB SER-ACNC: 4.44 MIU/ML
HBV SURFACE AG SER QL: <0.1 INDEX
HBV SURFACE AG SER QL: NEGATIVE
HCT VFR BLD AUTO: 30.3 % (ref 35–47)
HGB BLD-MCNC: 9 G/DL (ref 11.5–16)
IMM GRANULOCYTES # BLD: 0 K/UL (ref 0–0.04)
IMM GRANULOCYTES NFR BLD AUTO: 0 % (ref 0–0.5)
LYMPHOCYTES # BLD: 1.1 K/UL (ref 0.8–3.5)
LYMPHOCYTES NFR BLD: 26 % (ref 12–49)
MAGNESIUM SERPL-MCNC: 1.6 MG/DL (ref 1.6–2.4)
MCH RBC QN AUTO: 25.9 PG (ref 26–34)
MCHC RBC AUTO-ENTMCNC: 29.7 G/DL (ref 30–36.5)
MCV RBC AUTO: 87.3 FL (ref 80–99)
MONOCYTES # BLD: 0.4 K/UL (ref 0–1)
MONOCYTES NFR BLD: 8 % (ref 5–13)
NEUTS SEG # BLD: 2.8 K/UL (ref 1.8–8)
NEUTS SEG NFR BLD: 63 % (ref 32–75)
NRBC # BLD: 0 K/UL (ref 0–0.01)
NRBC BLD-RTO: 0 PER 100 WBC
PHOSPHATE SERPL-MCNC: 1.8 MG/DL (ref 2.6–4.7)
PLATELET # BLD AUTO: 264 K/UL (ref 150–400)
PLATELET COMMENTS,PCOM: NORMAL
PMV BLD AUTO: 10.3 FL (ref 8.9–12.9)
POTASSIUM SERPL-SCNC: 3.9 MMOL/L (ref 3.5–5.1)
PROT SERPL-MCNC: 5.7 G/DL (ref 6.4–8.2)
RBC # BLD AUTO: 3.47 M/UL (ref 3.8–5.2)
RBC MORPH BLD: NORMAL
SERVICE CMNT-IMP: NORMAL
SODIUM SERPL-SCNC: 140 MMOL/L (ref 136–145)
WBC # BLD AUTO: 4.4 K/UL (ref 3.6–11)

## 2018-04-02 PROCEDURE — 74011250637 HC RX REV CODE- 250/637: Performed by: HOSPITALIST

## 2018-04-02 PROCEDURE — 74011000250 HC RX REV CODE- 250: Performed by: HOSPITALIST

## 2018-04-02 PROCEDURE — 74011000258 HC RX REV CODE- 258: Performed by: INTERNAL MEDICINE

## 2018-04-02 PROCEDURE — 36415 COLL VENOUS BLD VENIPUNCTURE: CPT | Performed by: INTERNAL MEDICINE

## 2018-04-02 PROCEDURE — 77030029684 HC NEB SM VOL KT MONA -A

## 2018-04-02 PROCEDURE — 85027 COMPLETE CBC AUTOMATED: CPT | Performed by: INTERNAL MEDICINE

## 2018-04-02 PROCEDURE — 80053 COMPREHEN METABOLIC PANEL: CPT | Performed by: INTERNAL MEDICINE

## 2018-04-02 PROCEDURE — 97116 GAIT TRAINING THERAPY: CPT

## 2018-04-02 PROCEDURE — 74011250637 HC RX REV CODE- 250/637: Performed by: NURSE PRACTITIONER

## 2018-04-02 PROCEDURE — 74011250637 HC RX REV CODE- 250/637: Performed by: INTERNAL MEDICINE

## 2018-04-02 PROCEDURE — 94640 AIRWAY INHALATION TREATMENT: CPT

## 2018-04-02 PROCEDURE — 83735 ASSAY OF MAGNESIUM: CPT | Performed by: INTERNAL MEDICINE

## 2018-04-02 PROCEDURE — 65270000029 HC RM PRIVATE

## 2018-04-02 PROCEDURE — 84100 ASSAY OF PHOSPHORUS: CPT | Performed by: INTERNAL MEDICINE

## 2018-04-02 PROCEDURE — 74011636637 HC RX REV CODE- 636/637: Performed by: HOSPITALIST

## 2018-04-02 PROCEDURE — 74011250636 HC RX REV CODE- 250/636: Performed by: HOSPITALIST

## 2018-04-02 PROCEDURE — 74011250636 HC RX REV CODE- 250/636: Performed by: INTERNAL MEDICINE

## 2018-04-02 PROCEDURE — 82962 GLUCOSE BLOOD TEST: CPT

## 2018-04-02 RX ORDER — LANOLIN ALCOHOL/MO/W.PET/CERES
400 CREAM (GRAM) TOPICAL 2 TIMES DAILY
Status: COMPLETED | OUTPATIENT
Start: 2018-04-02 | End: 2018-04-03

## 2018-04-02 RX ORDER — SODIUM,POTASSIUM PHOSPHATES 280-250MG
2 POWDER IN PACKET (EA) ORAL
Status: ACTIVE | OUTPATIENT
Start: 2018-04-02 | End: 2018-04-03

## 2018-04-02 RX ADMIN — GEMFIBROZIL 300 MG: 600 TABLET ORAL at 08:23

## 2018-04-02 RX ADMIN — HYDROMORPHONE HYDROCHLORIDE 0.5 MG: 2 INJECTION INTRAMUSCULAR; INTRAVENOUS; SUBCUTANEOUS at 14:51

## 2018-04-02 RX ADMIN — PANTOPRAZOLE SODIUM 40 MG: 40 TABLET, DELAYED RELEASE ORAL at 05:47

## 2018-04-02 RX ADMIN — AMLODIPINE BESYLATE 5 MG: 5 TABLET ORAL at 08:24

## 2018-04-02 RX ADMIN — AMITRIPTYLINE HYDROCHLORIDE 25 MG: 10 TABLET, FILM COATED ORAL at 21:04

## 2018-04-02 RX ADMIN — HYDROMORPHONE HYDROCHLORIDE 0.5 MG: 2 INJECTION INTRAMUSCULAR; INTRAVENOUS; SUBCUTANEOUS at 20:44

## 2018-04-02 RX ADMIN — HYDROMORPHONE HYDROCHLORIDE 0.5 MG: 2 INJECTION INTRAMUSCULAR; INTRAVENOUS; SUBCUTANEOUS at 17:41

## 2018-04-02 RX ADMIN — HYDROMORPHONE HYDROCHLORIDE 0.5 MG: 2 INJECTION INTRAMUSCULAR; INTRAVENOUS; SUBCUTANEOUS at 11:49

## 2018-04-02 RX ADMIN — HYDROXYCHLOROQUINE SULFATE 200 MG: 200 TABLET, FILM COATED ORAL at 17:41

## 2018-04-02 RX ADMIN — HYDROMORPHONE HYDROCHLORIDE 0.5 MG: 2 INJECTION INTRAMUSCULAR; INTRAVENOUS; SUBCUTANEOUS at 08:47

## 2018-04-02 RX ADMIN — CASTOR OIL AND BALSAM, PERU: 788; 87 OINTMENT TOPICAL at 14:52

## 2018-04-02 RX ADMIN — CASTOR OIL AND BALSAM, PERU: 788; 87 OINTMENT TOPICAL at 05:46

## 2018-04-02 RX ADMIN — SENNOSIDES 17.2 MG: 8.6 TABLET, FILM COATED ORAL at 21:03

## 2018-04-02 RX ADMIN — Medication 10 ML: at 14:53

## 2018-04-02 RX ADMIN — Medication 400 MG: at 17:41

## 2018-04-02 RX ADMIN — SODIUM CHLORIDE 1 G: 900 INJECTION, SOLUTION INTRAVENOUS at 20:44

## 2018-04-02 RX ADMIN — Medication 2500 MCG: at 08:23

## 2018-04-02 RX ADMIN — CARVEDILOL 12.5 MG: 12.5 TABLET, FILM COATED ORAL at 17:41

## 2018-04-02 RX ADMIN — BUDESONIDE 500 MCG: 0.5 INHALANT RESPIRATORY (INHALATION) at 09:16

## 2018-04-02 RX ADMIN — ARFORMOTEROL TARTRATE 15 MCG: 15 SOLUTION RESPIRATORY (INHALATION) at 09:16

## 2018-04-02 RX ADMIN — ARFORMOTEROL TARTRATE 15 MCG: 15 SOLUTION RESPIRATORY (INHALATION) at 21:23

## 2018-04-02 RX ADMIN — Medication 10 ML: at 02:13

## 2018-04-02 RX ADMIN — Medication 10 ML: at 21:04

## 2018-04-02 RX ADMIN — BUDESONIDE 500 MCG: 0.5 INHALANT RESPIRATORY (INHALATION) at 21:23

## 2018-04-02 RX ADMIN — PREDNISONE 5 MG: 5 TABLET ORAL at 08:24

## 2018-04-02 RX ADMIN — HYDROXYCHLOROQUINE SULFATE 200 MG: 200 TABLET, FILM COATED ORAL at 08:24

## 2018-04-02 RX ADMIN — ONDANSETRON 4 MG: 2 INJECTION INTRAMUSCULAR; INTRAVENOUS at 17:11

## 2018-04-02 RX ADMIN — Medication 10 ML: at 05:47

## 2018-04-02 RX ADMIN — CASTOR OIL AND BALSAM, PERU: 788; 87 OINTMENT TOPICAL at 21:06

## 2018-04-02 RX ADMIN — HYDROMORPHONE HYDROCHLORIDE 0.5 MG: 2 INJECTION INTRAMUSCULAR; INTRAVENOUS; SUBCUTANEOUS at 05:46

## 2018-04-02 RX ADMIN — CARVEDILOL 12.5 MG: 12.5 TABLET, FILM COATED ORAL at 08:23

## 2018-04-02 RX ADMIN — HYDROMORPHONE HYDROCHLORIDE 0.5 MG: 2 INJECTION INTRAMUSCULAR; INTRAVENOUS; SUBCUTANEOUS at 02:11

## 2018-04-02 NOTE — PROGRESS NOTES
Hospitalist Progress Note            This is a 21-year-old female with multiple medical problems including SLE, end-stage renal disease (on hemodialysis, TTS), pulmonary embolism, on Eliquis (2012), hyperlipidemia, hypertension, recent Candida peritonitis, chronic bilateral pain.  She comes over here because of abdominal pain since last 4 or 5 days. Daily Progress Note: 4/2/2018    Assessment/Plan:   Acute peritonitis (e coli) with sepsis  - also with recent candida peritonitis  - CT abd 3/11 Large amount of free intraperitoneal fluid appears somewhat loculated  - Follow cultures, blood culture 3/11 unremarkable. Peritoneal fluid heavy E coli  - Antibiotics per ID  - continue Cefepime (previously on Vanc/ Zosyn/ voriconazole)  - s/p paracentesis  - Repeat CT on 3/17 shows improvement on loculated fluid.  Repeat fluid culture and cell count ordered by nephro on 3/17. C/s from 3/18 positive for ecoli , continue cefepime   Off imuran now  - Repeat CT abd 3/23 The crescentic peritoneal collection extending from right to left in the anterior abdomen is again noted. It is relatively unchanged in size and extent compared to the previous study. The drainage end of the pigtail catheter remains in the right side of the collection. Trace ascites is noted  - US 3/25 no drain able fluid lt abdomen  - The patient might need more drainage of abdominal fluid, not sure if the catheter would be able to drain all of it, now. ?Surgery vs prolonged antibiotics  - repeat CT abd 3/30 Stable peritoneal fluid collection containing a percutaneous drain  - Continue IV antibiotics till 4/6/2018, IR to reassess drain       Hypotension now with HTN  -Takes coreg 12.5 bid at home, now on 6.25,will increase as tolerated. -continue prednisone     Thrombocytopenia   -Monitor closely, improving  -Switched Zosyn to cefepime.   Stable      Bilateral pedal edema  -Dopplers negative for DVT  - mild edema 3/20/2018       SLE  -continue home meds  -D/C'd Imuran, allopurinol by nephro with acute infection and thrombocytopenia.       History of PE  - on Eliquis since   See note from previous hosptalist  - Appreciate discussion with PMD 3/13. The patient was seen by hematology in  but seems was lost to follow up. I think at that time the plan was to stop anticoagulation if her SLE is stable. Spoke to Dr Tami Arzola, he feels that the patient can come off Eliquis. His records do not suggest any recurrence of DVT/PE. Spoke to Dr Krysta Gutierrez, he has not seen the patient since >1 yr. If antiphospholipid antibodies negative then the patient can come off Eliquis  - 10/17 V/Q high probability for PE  - Per oncology the patient should be continued to Eliquis indefinitely      Hypophosphatemia:  -replete     ESRD  -on HD TTS      Anemia  -of chronic disease  -Transfused 2 units PRBC with HD 3/15     Severe hepatic steatosis  -monitor      HTN  -stable      Severe protein calorie malnutrition  -Consult nutrition  -nutritional supp        DISPO: home once better       Subjective:   Feels OK, but abd still hurts. No n/v/d, no dyspnea. Review of Systems:   As above    Objective:     Visit Vitals    BP (!) 137/91 (BP 1 Location: Left arm, BP Patient Position: At rest)    Pulse (!) 101    Temp 98.6 °F (37 °C)    Resp 18    Ht 5' 5\" (1.651 m)    Wt 72.2 kg (159 lb 2.8 oz)    SpO2 94%    BMI 26.49 kg/m2      O2 Device: Room air    Temp (24hrs), Av.8 °F (37.1 °C), Min:98.3 °F (36.8 °C), Max:99.6 °F (37.6 °C)            1901 -  0700  In: -   Out: 280 [Drains:280]    EXAM:  General: WD, WN. Alert, cooperative, no acute distress    HEENT: NC, atraumatic. PERRL, EOMI. Anicteric sclerae. Neck:   Supple, trachea midline  Lungs:  CTA bilaterally. No Wheezing/Rhonchi/Rales. Heart:  Regular rhythm,  No murmur (), No Rubs, No Gallops  Abdomen: Distended. Mild diffuse tenderness.  Paracentesis drain in place.  +Bowel sounds, no HSM  Extremities: No c/c/e  Neurologic:  CN 2-12 gi, Alert and oriented X 3. No acute neurological distress   Psych:   Fair insight. Not anxious nor agitated. Data Review:     Recent Results (from the past 24 hour(s))   GLUCOSE, POC    Collection Time: 04/01/18  4:17 PM   Result Value Ref Range    Glucose (POC) 84 65 - 100 mg/dL    Performed by Briana Fernandez    GLUCOSE, POC    Collection Time: 04/01/18  9:34 PM   Result Value Ref Range    Glucose (POC) 96 65 - 100 mg/dL    Performed by Clayton Campoverde    CBC W/O DIFF    Collection Time: 04/02/18  2:46 AM   Result Value Ref Range    WBC 4.4 3.6 - 11.0 K/uL    RBC 3.47 (L) 3.80 - 5.20 M/uL    HGB 9.0 (L) 11.5 - 16.0 g/dL    HCT 30.3 (L) 35.0 - 47.0 %    MCV 87.3 80.0 - 99.0 FL    MCH 25.9 (L) 26.0 - 34.0 PG    MCHC 29.7 (L) 30.0 - 36.5 g/dL    RDW 21.6 (H) 11.5 - 14.5 %    PLATELET 163 694 - 376 K/uL    MPV 10.3 8.9 - 12.9 FL    NRBC 0.0 0  WBC    ABSOLUTE NRBC 0.00 0.00 - 0.01 K/uL   MAGNESIUM    Collection Time: 04/02/18  2:46 AM   Result Value Ref Range    Magnesium 1.6 1.6 - 2.4 mg/dL   PHOSPHORUS    Collection Time: 04/02/18  2:46 AM   Result Value Ref Range    Phosphorus 1.8 (L) 2.6 - 4.7 MG/DL   METABOLIC PANEL, COMPREHENSIVE    Collection Time: 04/02/18  2:46 AM   Result Value Ref Range    Sodium 140 136 - 145 mmol/L    Potassium 3.9 3.5 - 5.1 mmol/L    Chloride 105 97 - 108 mmol/L    CO2 28 21 - 32 mmol/L    Anion gap 7 5 - 15 mmol/L    Glucose 72 65 - 100 mg/dL    BUN 9 6 - 20 MG/DL    Creatinine 3.72 (H) 0.55 - 1.02 MG/DL    BUN/Creatinine ratio 2 (L) 12 - 20      GFR est AA 17 (L) >60 ml/min/1.73m2    GFR est non-AA 14 (L) >60 ml/min/1.73m2    Calcium 7.9 (L) 8.5 - 10.1 MG/DL    Bilirubin, total 0.2 0.2 - 1.0 MG/DL    ALT (SGPT) 7 (L) 12 - 78 U/L    AST (SGOT) 10 (L) 15 - 37 U/L    Alk.  phosphatase 112 45 - 117 U/L    Protein, total 5.7 (L) 6.4 - 8.2 g/dL    Albumin 1.1 (L) 3.5 - 5.0 g/dL    Globulin 4.6 (H) 2.0 - 4.0 g/dL    A-G Ratio 0.2 (L) 1.1 - 2.2 DIFFERENTIAL, AUTO    Collection Time: 04/02/18  2:46 AM   Result Value Ref Range    NEUTROPHILS 63 32 - 75 %    LYMPHOCYTES 26 12 - 49 %    MONOCYTES 8 5 - 13 %    EOSINOPHILS 2 0 - 7 %    BASOPHILS 1 0 - 1 %    IMMATURE GRANULOCYTES 0 0.0 - 0.5 %    ABS. NEUTROPHILS 2.8 1.8 - 8.0 K/UL    ABS. LYMPHOCYTES 1.1 0.8 - 3.5 K/UL    ABS. MONOCYTES 0.4 0.0 - 1.0 K/UL    ABS. EOSINOPHILS 0.1 0.0 - 0.4 K/UL    ABS. BASOPHILS 0.0 0.0 - 0.1 K/UL    ABS. IMM.  GRANS. 0.0 0.00 - 0.04 K/UL    DF SMEAR SCANNED      PLATELET COMMENTS Large Platelets      RBC COMMENTS ANISOCYTOSIS  1+        RBC COMMENTS OVALOCYTES  PRESENT        RBC COMMENTS POLYCHROMASIA  PRESENT        RBC COMMENTS HYPOCHROMIA  1+       GLUCOSE, POC    Collection Time: 04/02/18  6:19 AM   Result Value Ref Range    Glucose (POC) 91 65 - 100 mg/dL    Performed by Yandel LOPEZ    GLUCOSE, POC    Collection Time: 04/02/18 11:17 AM   Result Value Ref Range    Glucose (POC) 94 65 - 100 mg/dL    Performed by Ximena Rodrigues        Principal Problem:    Peritonitis ([address] (3/11/2018)        Medications reviewed  Current Facility-Administered Medications   Medication Dose Route Frequency    magnesium oxide (MAG-OX) tablet 400 mg  400 mg Oral BID    potassium, sodium phosphates (NEUTRA-PHOS) packet 2 Packet  2 Packet Oral NOW    insulin lispro (HUMALOG) injection   SubCUTAneous AC&HS    senna (SENOKOT) tablet 17.2 mg  2 Tab Oral QHS    balsam peru-castor oil (VENELEX)  mg/gram ointment   Topical Q8H    amLODIPine (NORVASC) tablet 5 mg  5 mg Oral DAILY    carvedilol (COREG) tablet 12.5 mg  12.5 mg Oral BID WITH MEALS    dextrose 5% and 0.9% NaCl infusion  25 mL/hr IntraVENous CONTINUOUS    hydrALAZINE (APRESOLINE) 20 mg/mL injection 5 mg  5 mg IntraVENous Q6H PRN    cefepime (MAXIPIME) 1 g in 0.9% sodium chloride (MBP/ADV) 50 mL ADV  1 g IntraVENous Q24H    apixaban (ELIQUIS) tablet 5 mg  5 mg Oral BID    0.9% sodium chloride infusion 250 mL  250 mL IntraVENous PRN    HYDROmorphone (DILAUDID) injection 0.5 mg  0.5 mg IntraVENous Q3H PRN    glucose chewable tablet 16 g  4 Tab Oral PRN    dextrose (D50W) injection syrg 12.5-25 g  12.5-25 g IntraVENous PRN    glucagon (GLUCAGEN) injection 1 mg  1 mg IntraMUSCular PRN    epoetin pia (EPOGEN;PROCRIT) 14,000 Units  14,000 Units SubCUTAneous DIALYSIS TUE, THU & SAT    predniSONE (DELTASONE) tablet 5 mg  5 mg Oral DAILY    pantoprazole (PROTONIX) tablet 40 mg  40 mg Oral ACB    cyanocobalamin (VITAMIN B12) tablet 2,500 mcg  2,500 mcg Oral DAILY    hydroxychloroquine (PLAQUENIL) tablet 200 mg  200 mg Oral BID    albuterol (PROVENTIL VENTOLIN) nebulizer solution 2.5 mg  2.5 mg Nebulization Q4H PRN    gemfibrozil (LOPID) tablet 300 mg  300 mg Oral DAILY    oxyCODONE IR (ROXICODONE) tablet 2.5-5 mg  2.5-5 mg Oral Q6H PRN    amitriptyline (ELAVIL) tablet 25 mg  25 mg Oral QHS    sodium chloride (NS) flush 5-10 mL  5-10 mL IntraVENous Q8H    sodium chloride (NS) flush 5-10 mL  5-10 mL IntraVENous PRN    ondansetron (ZOFRAN) injection 4 mg  4 mg IntraVENous Q4H PRN    arformoterol (BROVANA) neb solution 15 mcg  15 mcg Nebulization BID RT    And    budesonide (PULMICORT) 500 mcg/2 ml nebulizer suspension  500 mcg Nebulization BID RT       Lux Le MD

## 2018-04-02 NOTE — PROGRESS NOTES
NUTRITION FOLLOW-UP ASSESSMENT    RECOMMENDATIONS:   1. Continue Nepro BID  2. Continue snack per preferences     Interventions/Plan:   Food/Nutrient Delivery:   (regular diet + snacks) Commercial supplement (Nepro BID)        Nutrition Education:     Coordination of Care:    Nutrition Counseling:        Assessment:   Reason for Assessment:   [x] Follow-up    Diet: Regular  Supplements: Nepro BID  Nutritionally Significant Medications: [x] Reviewed & Includes: EPO, B12, Lopid, Prednisone, Senokot, insulin  Meal Intake: averaging 50%      Subjective:  My stomach is still sore, but appetite better. I haven't had a BM in a week. They give me Senokot. Drinking the Nepro and still like my snacks. No nausea. Chew/swallow ok. Wt going up and down with the fluid. Objective:  Hx SLE; ESRD on HD; HL Rx Lopid, pulmonary emoblism, HTN. Although pt reports appetite better, intake averages ~50%. Diet appropiately supplemented with Nepro, however, two unopened at bedside. Does not consistently drink BID as Rx'd, but does attempt. Snacks sent to supplement diet and pt satisfied with menu selections and snacks. Constipation, last BM 3/30, Rx Senokot. Wt changes influenced by fluid. Wt 3/26/18 65.6 kg standing; Last HD 3/31 pre-HD wt 67.7 kg, w/1.5 liter fluid removed, no post-HD wt however, tech notes +2 RUE and 1+ BLE. Today 72.2 kg (bed scale). Difficult to trend dry wt changes with fluid. Noted WOCN 3/29/18, sacral wound epitheliazed. HL with Rx Lopid; Rx prednisone with POC BG 94 & 91 (4/2/18) and insulin, no dietary restrictions. PO4+ 1.8 low (4/2/18). Rx Neutra-Phos; also Rx B12 and EPO. Nepro provides additional 4.6 mcg B12 and 340 mg PO4+/day if 100% Nepro BID consumed. Remains at nutrition risk with fair intake.     Estimated Nutrition Needs:   Kcals/day: 1980 Kcals/day (1980-2310kcal)  Protein: 92 g (79-92g (1.2-1.4g/kg))  Fluid: 1980 ml (30ml/kg or per renal)     Based On: Kcal/kg - specify (Comment) (30-35kcal/kg)  Weight Used: Actual wt (66kg)    Pt expected to meet estimated nutrient needs: [x]  No, not without ONS  Comparative Standards:  ;  ;      Nutrition Diagnosis:   1. Malnutrition related to inadequate protein/energy intake 2/2 poor appetite as evidenced by consuming less than 60% energy and 59% protein needs daily; severe wt loss of 21% x 6 months; muscle/fat wasting    2. (Increased protein/energy needs) related to increased energy expenditure 2/2 ESRD as evidenced by on HD, severe wt loss since start of HD      Goals:     Consumption of at least 50% of meals and 2 Nepro per day + 2 supplements/day in 5-7 days     Monitoring & Evaluation:    - Total energy intake, Liquid meal replacement   - Weight/weight change, Electrolyte and renal profile, GI profile     Previous Nutrition Goals Met:  ~ met with meal intake averaging ~50% and accepting Nepro attempting BID    Previous Recommendations:      N/A    Education & Discharge Needs:   [x] Identified and addressed: Discussed protein/nutrient needs    [x] Participated in care plan, discharge planning, and/or interdisciplinary rounds        Cultural, Caodaism and ethnic food preferences identified:   None    Skin Integrity: [x]Intact   Edema: [x]   Last BM: 3/30/18  Food Allergies: [x]NKFA    Anthropometrics:    Weight Loss Metrics 4/2/2018 3/5/2018 1/29/2018 1/19/2018 1/17/2018 1/3/2018 12/21/2017   Today's Wt 159 lb 2.8 oz 150 lb 153 lb 146 lb 9.7 oz 146 lb 9.7 oz 135 lb 130 lb 9.6 oz   BMI 26.49 kg/m2 24.96 kg/m2 25.46 kg/m2 24.4 kg/m2 24.4 kg/m2 22.47 kg/m2 -      Last 3 Recorded Weights in this Encounter    03/31/18 0818 04/01/18 0424 04/02/18 0548   Weight: 68.6 kg (151 lb 2.6 oz) 69.3 kg (152 lb 12.8 oz) 72.2 kg (159 lb 2.8 oz)      Weight Source: Bed  Height: 5' 5\" (165.1 cm),    Body mass index is 26.49 kg/(m^2).   IBW : 56.7 kg (125 lb), % IBW (Calculated): 117.04 %  Usual Body Weight: 83 kg (183 lb),      Labs:    Lab Results Component Value Date/Time    Sodium 140 04/02/2018 02:46 AM    Potassium 3.9 04/02/2018 02:46 AM    Chloride 105 04/02/2018 02:46 AM    CO2 28 04/02/2018 02:46 AM    Glucose 72 04/02/2018 02:46 AM    BUN 9 04/02/2018 02:46 AM    Creatinine 3.72 (H) 04/02/2018 02:46 AM    Calcium 7.9 (L) 04/02/2018 02:46 AM    Magnesium 1.6 04/02/2018 02:46 AM    Phosphorus 1.8 (L) 04/02/2018 02:46 AM    Albumin 1.1 (L) 04/02/2018 02:46 AM     Lab Results   Component Value Date/Time    Hemoglobin A1c 5.4 09/25/2015 02:02 PM     Lab Results   Component Value Date/Time    Glucose 72 04/02/2018 02:46 AM    Glucose (POC) 94 04/02/2018 11:17 AM      Lab Results   Component Value Date/Time    ALT (SGPT) 7 (L) 04/02/2018 02:46 AM    AST (SGOT) 10 (L) 04/02/2018 02:46 AM    Alk.  phosphatase 112 04/02/2018 02:46 AM    Bilirubin, direct 0.1 11/27/2017 03:02 AM    Bilirubin, total 0.2 04/02/2018 02:46 AM        Christine Hunter RD

## 2018-04-02 NOTE — PROGRESS NOTES
Patient name: Marjorie Arango  MRN: [id number]    Nephrology Progress note:    Assessment:  ESRD: on HD TTS - [facility] -     E. Coli Bacteremia: source intra-abd source/ E. Coli peritonitis. IV zosyn. Repeat Bld Cx 3/11-> Neg. Recent hx of Candida peritonitis    HTN:    Hx of PE: on Eliquis    Lupus: Anemia 2 to ESRD/Lupus: Hgb remains below goal - on EPO    SHPT: Hypophosphatemia    Protein malnutrition    Hypoglycemia: wean D5NS fluids to 25cc/hr         Subjective:  Awaiting larger drain placement by IR.  Reports trying to push intake    ROS:  No n/v  +Abd pain         Exam:  Visit Vitals    BP (!) 133/91 (BP 1 Location: Left arm, BP Patient Position: At rest;Head of bed elevated (Comment degrees))    Pulse 92    Temp 98.6 °F (37 °C)    Resp 16    Ht 5' 5\" (1.651 m)    Wt 72.2 kg (159 lb 2.8 oz)    SpO2 96%    BMI 26.49 kg/m2     Wt Readings from Last 3 Encounters:   04/02/18 72.2 kg (159 lb 2.8 oz)   03/05/18 68 kg (150 lb)   01/29/18 69.4 kg (153 lb)       Intake/Output Summary (Last 24 hours) at 04/02/18 1432  Last data filed at 04/02/18 0550   Gross per 24 hour   Intake                0 ml   Output              125 ml   Net             -125 ml       NAD  CTAB/l  RUE/RLE edema  Abd drain, mild TTP      Labs/Data:    Lab Results   Component Value Date/Time    WBC 4.4 04/02/2018 02:46 AM    Hemoglobin (POC) 7.8 (L) 01/19/2018 12:30 PM    HGB 9.0 (L) 04/02/2018 02:46 AM    Hematocrit (POC) 23 (L) 01/19/2018 12:30 PM    HCT 30.3 (L) 04/02/2018 02:46 AM PLATELET 197 06/32/4392 02:46 AM    MCV 87.3 04/02/2018 02:46 AM       Lab Results   Component Value Date/Time    Sodium 140 04/02/2018 02:46 AM    Potassium 3.9 04/02/2018 02:46 AM    Chloride 105 04/02/2018 02:46 AM    CO2 28 04/02/2018 02:46 AM    Anion gap 7 04/02/2018 02:46 AM    Glucose 72 04/02/2018 02:46 AM    BUN 9 04/02/2018 02:46 AM    Creatinine 3.72 (H) 04/02/2018 02:46 AM    BUN/Creatinine ratio 2 (L) 04/02/2018 02:46 AM    GFR est AA 17 (L) 04/02/2018 02:46 AM    GFR est non-AA 14 (L) 04/02/2018 02:46 AM    Calcium 7.9 (L) 04/02/2018 02:46 AM        Discussed with patient

## 2018-04-02 NOTE — PROGRESS NOTES
Problem: Mobility Impaired (Adult and Pediatric)  Goal: *Acute Goals and Plan of Care (Insert Text)  Physical Therapy Goals  Revised 4/2/2018  1. Patient will move from supine to sit and sit to supine  and roll side to side in bed with independence within 7 day(s). 2.  Patient will transfer from bed to chair and chair to bed with modified independence using the least restrictive device within 7 day(s). 3.  Patient will perform sit to stand with modified independence within 7 day(s). 4.  Patient will ambulate with modified independence for 150 feet with the least restrictive device within 7 day(s). Physical Therapy Goals  Initiated 3/16/2018  1. Patient will move from supine to sit and sit to supine  in bed with supervision/set-up within 7 day(s). 2.  Patient will transfer from bed to chair and chair to bed with supervision/set-up using the least restrictive device within 7 day(s). 3.  Patient will perform sit to stand with minimal assistance/contact guard assist within 7 day(s). 4.  Patient will ambulate with minimal assistance/contact guard assist for 50 feet with the least restrictive device within 7 day(s). physical Therapy TREATMENT: WEEKLY REASSESSMENT  Patient: Mya Benoit (30 y.o. female)  Date: 4/2/2018  Diagnosis: Peritonitis ([address]itis ([facility] [address]       Precautions: Fall (abd drain)  Chart, physical therapy assessment, plan of care and goals were reviewed.     ASSESSMENT:  Pt is making slow, steady progress, pt needed moderate assistance to transfer supine to sit, minimal assist x 2 to stand, and tolerated ambulation with rolling walker x 70 feet with CGA x 1, gait is slow and antalgic but generally steady with walker support, pt agreeable to remain up in chair following therapy, will continue to encourage pt to increase her activity and time OOB, anticipate pt will be appropriate to return home with family support, recommend home health        PLAN:  Goals have been updated based on progression since last assessment. Patient continues to benefit from skilled intervention to address the above impairments. Continue to follow the patient 5 times a week to address goals. Planned Interventions:  [x]              Bed Mobility Training             []       Neuromuscular Re-Education  [x]              Transfer Training                   []       Orthotic/Prosthetic Training  [x]              Gait Training                         []       Modalities  []              Therapeutic Exercises           []       Edema Management/Control  []              Therapeutic Activities            [x]       Patient and Family Training/Education  []              Other (comment):  Discharge Recommendations: Home Health  Further Equipment Recommendations for Discharge: to be determined      SUBJECTIVE:   Patient stated I have not been walking too much.   Pt agreeable to participate with therapy.      OBJECTIVE DATA SUMMARY:   Critical Behavior:  Neurologic State: Alert  Orientation Level: Oriented X4  Cognition: Appropriate decision making, Follows commands  Safety/Judgement: Awareness of environment    Strength:    generally decreased, functional                       Functional Mobility Training:  Bed Mobility:  Rolling: Modified independent  Supine to Sit: Moderate assistance;Assist x1;Additional time, pt raised HOB however unable to transfer to sitting without assistance      Scooting: Supervision        Transfers:  Sit to Stand: Assist x2;Minimum assistance  Stand to Sit: Assist x1;Minimum assistance      Pt needs cues for correct hand placement for safe transfers                       Balance:  Sitting: Intact  Standing: Impaired  Standing - Static: Good  Standing - Dynamic : Fair  Ambulation/Gait Training:  Distance (ft): 70 Feet (ft)  Assistive Device: Gait belt;Walker, rolling  Ambulation - Level of Assistance: Contact guard assistance;Assist x1        Gait Abnormalities: Antalgic;Decreased step clearance              Speed/Vanita: Slow  Step Length: Right shortened;Left shortened              Pain:  Pain Scale 1: Numeric (0 - 10)  Pain Intensity 1: 8  Pain Location 1: Abdomen  Pain Orientation 1: Lower  Pain Description 1: Aching  Pain Intervention(s) 1: see MAR  Activity Tolerance:   Slowly improving, limited tolerance due to abdominal pain     After treatment:   [x]  Patient left in no apparent distress sitting up in chair  []  Patient left in no apparent distress in bed  [x]  Call bell left within reach  [x]  Nursing notified  []  Caregiver present  []  Bed alarm activated    COMMUNICATION/COLLABORATION:   The patients plan of care was discussed with: Registered Nurse    Bertha Palma   Time Calculation: 10 mins

## 2018-04-03 LAB
GLUCOSE BLD STRIP.AUTO-MCNC: 70 MG/DL (ref 65–100)
GLUCOSE BLD STRIP.AUTO-MCNC: 73 MG/DL (ref 65–100)
GLUCOSE BLD STRIP.AUTO-MCNC: 85 MG/DL (ref 65–100)
GLUCOSE BLD STRIP.AUTO-MCNC: 87 MG/DL (ref 65–100)
GLUCOSE BLD STRIP.AUTO-MCNC: 96 MG/DL (ref 65–100)
SERVICE CMNT-IMP: NORMAL

## 2018-04-03 PROCEDURE — 74011250637 HC RX REV CODE- 250/637: Performed by: NURSE PRACTITIONER

## 2018-04-03 PROCEDURE — 74011250637 HC RX REV CODE- 250/637: Performed by: INTERNAL MEDICINE

## 2018-04-03 PROCEDURE — 74011000258 HC RX REV CODE- 258: Performed by: INTERNAL MEDICINE

## 2018-04-03 PROCEDURE — 74011250636 HC RX REV CODE- 250/636: Performed by: INTERNAL MEDICINE

## 2018-04-03 PROCEDURE — 82962 GLUCOSE BLOOD TEST: CPT

## 2018-04-03 PROCEDURE — 74011000250 HC RX REV CODE- 250: Performed by: HOSPITALIST

## 2018-04-03 PROCEDURE — 74011636637 HC RX REV CODE- 636/637: Performed by: HOSPITALIST

## 2018-04-03 PROCEDURE — 65270000029 HC RM PRIVATE

## 2018-04-03 PROCEDURE — 97116 GAIT TRAINING THERAPY: CPT

## 2018-04-03 PROCEDURE — 74011250637 HC RX REV CODE- 250/637: Performed by: HOSPITALIST

## 2018-04-03 PROCEDURE — 74011250636 HC RX REV CODE- 250/636: Performed by: HOSPITALIST

## 2018-04-03 PROCEDURE — 94640 AIRWAY INHALATION TREATMENT: CPT

## 2018-04-03 RX ORDER — SODIUM,POTASSIUM PHOSPHATES 280-250MG
2 POWDER IN PACKET (EA) ORAL ONCE
Status: ACTIVE | OUTPATIENT
Start: 2018-04-03 | End: 2018-04-04

## 2018-04-03 RX ADMIN — SENNOSIDES 17.2 MG: 8.6 TABLET, FILM COATED ORAL at 22:02

## 2018-04-03 RX ADMIN — Medication 2500 MCG: at 08:59

## 2018-04-03 RX ADMIN — HYDROMORPHONE HYDROCHLORIDE 0.5 MG: 2 INJECTION INTRAMUSCULAR; INTRAVENOUS; SUBCUTANEOUS at 00:56

## 2018-04-03 RX ADMIN — PREDNISONE 5 MG: 5 TABLET ORAL at 09:00

## 2018-04-03 RX ADMIN — OXYCODONE HYDROCHLORIDE 2.5 MG: 5 TABLET ORAL at 22:27

## 2018-04-03 RX ADMIN — AMLODIPINE BESYLATE 5 MG: 5 TABLET ORAL at 09:00

## 2018-04-03 RX ADMIN — Medication 10 ML: at 13:45

## 2018-04-03 RX ADMIN — HYDROMORPHONE HYDROCHLORIDE 0.5 MG: 2 INJECTION INTRAMUSCULAR; INTRAVENOUS; SUBCUTANEOUS at 17:04

## 2018-04-03 RX ADMIN — BUDESONIDE 500 MCG: 0.5 INHALANT RESPIRATORY (INHALATION) at 07:24

## 2018-04-03 RX ADMIN — HYDROXYCHLOROQUINE SULFATE 200 MG: 200 TABLET, FILM COATED ORAL at 08:59

## 2018-04-03 RX ADMIN — CASTOR OIL AND BALSAM, PERU: 788; 87 OINTMENT TOPICAL at 13:45

## 2018-04-03 RX ADMIN — HYDROMORPHONE HYDROCHLORIDE 0.5 MG: 2 INJECTION INTRAMUSCULAR; INTRAVENOUS; SUBCUTANEOUS at 20:18

## 2018-04-03 RX ADMIN — BUDESONIDE 500 MCG: 0.5 INHALANT RESPIRATORY (INHALATION) at 20:24

## 2018-04-03 RX ADMIN — Medication 10 ML: at 20:19

## 2018-04-03 RX ADMIN — HYDROMORPHONE HYDROCHLORIDE 0.5 MG: 2 INJECTION INTRAMUSCULAR; INTRAVENOUS; SUBCUTANEOUS at 13:43

## 2018-04-03 RX ADMIN — AMITRIPTYLINE HYDROCHLORIDE 25 MG: 10 TABLET, FILM COATED ORAL at 22:02

## 2018-04-03 RX ADMIN — CARVEDILOL 12.5 MG: 12.5 TABLET, FILM COATED ORAL at 09:00

## 2018-04-03 RX ADMIN — ARFORMOTEROL TARTRATE 15 MCG: 15 SOLUTION RESPIRATORY (INHALATION) at 07:24

## 2018-04-03 RX ADMIN — PANTOPRAZOLE SODIUM 40 MG: 40 TABLET, DELAYED RELEASE ORAL at 07:28

## 2018-04-03 RX ADMIN — ARFORMOTEROL TARTRATE 15 MCG: 15 SOLUTION RESPIRATORY (INHALATION) at 20:24

## 2018-04-03 RX ADMIN — Medication 400 MG: at 09:00

## 2018-04-03 RX ADMIN — GEMFIBROZIL 300 MG: 600 TABLET ORAL at 09:00

## 2018-04-03 RX ADMIN — HYDROMORPHONE HYDROCHLORIDE 0.5 MG: 2 INJECTION INTRAMUSCULAR; INTRAVENOUS; SUBCUTANEOUS at 23:10

## 2018-04-03 RX ADMIN — DEXTROSE MONOHYDRATE AND SODIUM CHLORIDE 25 ML/HR: 5; .9 INJECTION, SOLUTION INTRAVENOUS at 22:15

## 2018-04-03 RX ADMIN — HYDROMORPHONE HYDROCHLORIDE 0.5 MG: 2 INJECTION INTRAMUSCULAR; INTRAVENOUS; SUBCUTANEOUS at 07:28

## 2018-04-03 RX ADMIN — Medication 10 ML: at 04:03

## 2018-04-03 RX ADMIN — SODIUM CHLORIDE 1 G: 900 INJECTION, SOLUTION INTRAVENOUS at 22:10

## 2018-04-03 NOTE — PROGRESS NOTES
Patient name: Michael Hummel  MRN: [id number]    Nephrology Progress note:    Assessment:  ESRD: on HD TTS - [facility]     E. Coli Bacteremia: source intra-abd source/ E. Coli peritonitis. IV zosyn. Repeat Bld Cx 3/11-> Neg. Recent hx of Candida peritonitis. Needs further IR intervention to appropriately drain abd    HTN:    Hx of PE: on Eliquis    Lupus: Anemia 2 to ESRD/Lupus: Hgb remains below goal - on EPO    SHPT: Hypophosphatemia    Protein malnutrition    Hypoglycemia: wean D5NS fluids to 25cc/hr    Edema: 3rd spacing         Subjective:  Seen on HD at 3:24pm. Increase UF goal. Awaiting larger drain placement by IR-> postponed again.      ROS:  No n/v  +Abd pain         Exam:  Visit Vitals    BP (!) 143/98 (BP 1 Location: Left arm, BP Patient Position: At rest)    Pulse 94    Temp 98.5 °F (36.9 °C)    Resp 16    Ht 5' 5\" (1.651 m)    Wt 72.2 kg (159 lb 2.8 oz)    SpO2 98%    BMI 26.49 kg/m2     Wt Readings from Last 3 Encounters:   04/02/18 72.2 kg (159 lb 2.8 oz)   03/05/18 68 kg (150 lb)   01/29/18 69.4 kg (153 lb)     No intake or output data in the 24 hours ending 04/03/18 1524    NAD  CTAB/l  RUE/RLE edema  Abd drain, mild TTP      Labs/Data:    Lab Results   Component Value Date/Time    WBC 4.4 04/02/2018 02:46 AM    Hemoglobin (POC) 7.8 (L) 01/19/2018 12:30 PM    HGB 9.0 (L) 04/02/2018 02:46 AM    Hematocrit (POC) 23 (L) 01/19/2018 12:30 PM    HCT 30.3 (L) 04/02/2018 02:46 AM    PLATELET 627 51/79/6651 02:46 AM    MCV 87.3 04/02/2018 02:46 AM Lab Results   Component Value Date/Time    Sodium 140 04/02/2018 02:46 AM    Potassium 3.9 04/02/2018 02:46 AM    Chloride 105 04/02/2018 02:46 AM    CO2 28 04/02/2018 02:46 AM    Anion gap 7 04/02/2018 02:46 AM    Glucose 72 04/02/2018 02:46 AM    BUN 9 04/02/2018 02:46 AM    Creatinine 3.72 (H) 04/02/2018 02:46 AM    BUN/Creatinine ratio 2 (L) 04/02/2018 02:46 AM    GFR est AA 17 (L) 04/02/2018 02:46 AM    GFR est non-AA 14 (L) 04/02/2018 02:46 AM    Calcium 7.9 (L) 04/02/2018 02:46 AM        Discussed with patient

## 2018-04-03 NOTE — PROGRESS NOTES
Ms. Ashleigh Jiménez has no new complaints this PM. Tolerating diet. Tm 99.6 HR: 91 BP: 142/92 Resp Rate: 15 98% sat on room air. Intake/Output Summary (Last 24 hours) at 04/02/18 2238  Last data filed at 04/02/18 0550   Gross per 24 hour   Intake                0 ml   Output              125 ml   Net             -125 ml   Exam: Cor: RRR. Lungs: Bilateral breath sounds. Clear to auscultation. Abd: Soft. Non distended. Tender. No guarding or rebound. Drain in place - output is non enteric. Labs:   Recent Results (from the past 12 hour(s))   GLUCOSE, POC    Collection Time: 04/02/18 11:17 AM   Result Value Ref Range    Glucose (POC) 94 65 - 100 mg/dL    Performed by Kingsley Kwon    GLUCOSE, POC    Collection Time: 04/02/18  4:02 PM   Result Value Ref Range    Glucose (POC) 82 65 - 100 mg/dL    Performed by Nagi Limon as tolerated. IV abx - Cefepime per Dr. Nilesh Steinberg. HD per Nephrology. Would repeat CT Scan at some point to reassess intra-abdominal collection. Plans per Dr. Nehemiah Jacome.

## 2018-04-03 NOTE — PROGRESS NOTES
Bedside shift change report given to Kellen Fry (oncoming nurse) by Real Gray RN (offgoing nurse). Report included the following information SBAR.

## 2018-04-03 NOTE — PROGRESS NOTES
[facility]ospital PCP [facility] appointment scheduled with Darby Delaney NP on Friday April 6,2018  @ 2:00 p.m.  Added to AVS. Nanette Zayas CM Specialist

## 2018-04-03 NOTE — PROGRESS NOTES
Hospitalist Progress Note            This is a 42-year-old female with multiple medical problems including SLE, end-stage renal disease (on hemodialysis, TTS), pulmonary embolism, on Eliquis (2012), hyperlipidemia, hypertension, recent Candida peritonitis, chronic bilateral pain.  She comes over here because of abdominal pain since last 4 or 5 days. Daily Progress Note: 4/3/2018    Assessment/Plan:   Acute peritonitis (e coli) with bacteremia and sepsis  - also with recent candida peritonitis  - CT abd 3/11 Large amount of free intraperitoneal fluid appears somewhat loculated  - Follow cultures, blood culture 3/11 unremarkable. Peritoneal fluid heavy E coli  - Antibiotics per ID  - continue Cefepime (previously on Vanc/ Zosyn/ voriconazole)  - s/p paracentesis  - Repeat CT on 3/17 shows improvement on loculated fluid.  Repeat fluid culture and cell count ordered by nephro on 3/17. C/s from 3/18 positive for ecoli , continue cefepime   Off imuran now  - Repeat CT abd 3/23 The crescentic peritoneal collection extending from right to left in the anterior abdomen is again noted. It is relatively unchanged in size and extent compared to the previous study. The drainage end of the pigtail catheter remains in the right side of the collection. Trace ascites is noted  - US 3/25 no drain able fluid lt abdomen  - The patient might need more drainage of abdominal fluid, not sure if the catheter would be able to drain all of it, now. ?Surgery vs prolonged antibiotics  - repeat CT abd 3/30 Stable peritoneal fluid collection containing a percutaneous drain  - Continue IV antibiotics till 4/6/2018, IR to reassess drain       Hypotension now with HTN  -Takes coreg 12.5 bid at home, now on 6.25,will increase as tolerated. -continue prednisone     Thrombocytopenia   -Monitor closely, improving  -Switched Zosyn to cefepime.   Stable      Bilateral pedal edema  -Dopplers negative for DVT  - mild edema 3/20/2018     SLE  -continue home meds  -D/C'd Imuran, allopurinol by nephro with acute infection and thrombocytopenia.       History of PE  - on Eliquis since   See note from previous hosptalist  - Appreciate discussion with PMD 3/13. The patient was seen by hematology in  but seems was lost to follow up. I think at that time the plan was to stop anticoagulation if her SLE is stable. Spoke to Dr Jenny Quintero, he feels that the patient can come off Eliquis. His records do not suggest any recurrence of DVT/PE. Spoke to Dr Dee Dee Whyte, he has not seen the patient since >1 yr. If antiphospholipid antibodies negative then the patient can come off Eliquis  - 10/17 V/Q high probability for PE  - Per oncology the patient should be continued to Eliquis indefinitely      Hypophosphatemia:  -replete prn     ESRD  -on HD TTS      Anemia  -of chronic disease  -Transfused 2 units PRBC with HD 3/15     Severe hepatic steatosis  -monitor      HTN  -stable      Severe protein calorie malnutrition  -Consult nutrition  -nutritional supp        DISPO: home once better       Subjective:   Feels OK, abd pain unchanged. No n/v/d, no dyspnea. Tolerating diet. Review of Systems:   As above    Objective:     Visit Vitals    BP (!) 138/97 (BP 1 Location: Left arm, BP Patient Position: At rest)    Pulse 82    Temp 98.7 °F (37.1 °C)    Resp 12    Ht 5' 5\" (1.651 m)    Wt 72.2 kg (159 lb 2.8 oz)    SpO2 97%    BMI 26.49 kg/m2      O2 Device: Room air    Temp (24hrs), Av.4 °F (36.9 °C), Min:98 °F (36.7 °C), Max:98.7 °F (37.1 °C)            1901 -  0700  In: -   Out: 125 [Drains:125]    EXAM:  General: WD, WN. Alert, cooperative, no acute distress    HEENT: NC, atraumatic. PERRL, EOMI. Anicteric sclerae. Neck:   Supple, trachea midline  Lungs:  CTA bilaterally. No Wheezing/Rhonchi/Rales. Heart:  Regular rhythm,  No murmur (), No Rubs, No Gallops  Abdomen: Distended. Mild diffuse tenderness. Paracentesis drain in place.  +Bowel sounds  Extremities: No c/c/e  Neurologic:  CN 2-12 gi, Alert and oriented X 3. No acute neurological distress   Psych:   Fair insight. Not anxious nor agitated.         Data Review:     Recent Results (from the past 24 hour(s))   GLUCOSE, POC    Collection Time: 04/02/18 11:17 AM   Result Value Ref Range    Glucose (POC) 94 65 - 100 mg/dL    Performed by [employer]    GLUCOSE, POC    Collection Time: 04/02/18  4:02 PM   Result Value Ref Range    Glucose (POC) 82 65 - 100 mg/dL    Performed by [employer]    GLUCOSE, POC    Collection Time: 04/03/18  6:34 AM   Result Value Ref Range    Glucose (POC) 70 65 - 100 mg/dL    Performed by Chuy Mancilla 25, POC    Collection Time: 04/03/18  8:08 AM   Result Value Ref Range    Glucose (POC) 96 65 - 100 mg/dL    Performed by Tania Pierre        Principal Problem:    Peritonitis ([address] (3/11/2018)        Medications reviewed  Current Facility-Administered Medications   Medication Dose Route Frequency    insulin lispro (HUMALOG) injection   SubCUTAneous AC&HS    senna (SENOKOT) tablet 17.2 mg  2 Tab Oral QHS    balsam peru-castor oil (VENELEX)  mg/gram ointment   Topical Q8H    amLODIPine (NORVASC) tablet 5 mg  5 mg Oral DAILY    carvedilol (COREG) tablet 12.5 mg  12.5 mg Oral BID WITH MEALS    dextrose 5% and 0.9% NaCl infusion  25 mL/hr IntraVENous CONTINUOUS    hydrALAZINE (APRESOLINE) 20 mg/mL injection 5 mg  5 mg IntraVENous Q6H PRN    cefepime (MAXIPIME) 1 g in 0.9% sodium chloride (MBP/ADV) 50 mL ADV  1 g IntraVENous Q24H    apixaban (ELIQUIS) tablet 5 mg  5 mg Oral BID    0.9% sodium chloride infusion 250 mL  250 mL IntraVENous PRN    HYDROmorphone (DILAUDID) injection 0.5 mg  0.5 mg IntraVENous Q3H PRN    glucose chewable tablet 16 g  4 Tab Oral PRN    dextrose (D50W) injection syrg 12.5-25 g  12.5-25 g IntraVENous PRN    glucagon (GLUCAGEN) injection 1 mg  1 mg IntraMUSCular PRN    epoetin pia (EPOGEN;PROCRIT) 14,000 Units 14,000 Units SubCUTAneous DIALYSIS TUE, THU & SAT    predniSONE (DELTASONE) tablet 5 mg  5 mg Oral DAILY    pantoprazole (PROTONIX) tablet 40 mg  40 mg Oral ACB    cyanocobalamin (VITAMIN B12) tablet 2,500 mcg  2,500 mcg Oral DAILY    hydroxychloroquine (PLAQUENIL) tablet 200 mg  200 mg Oral BID    albuterol (PROVENTIL VENTOLIN) nebulizer solution 2.5 mg  2.5 mg Nebulization Q4H PRN    gemfibrozil (LOPID) tablet 300 mg  300 mg Oral DAILY    oxyCODONE IR (ROXICODONE) tablet 2.5-5 mg  2.5-5 mg Oral Q6H PRN    amitriptyline (ELAVIL) tablet 25 mg  25 mg Oral QHS    sodium chloride (NS) flush 5-10 mL  5-10 mL IntraVENous Q8H    sodium chloride (NS) flush 5-10 mL  5-10 mL IntraVENous PRN    ondansetron (ZOFRAN) injection 4 mg  4 mg IntraVENous Q4H PRN    arformoterol (BROVANA) neb solution 15 mcg  15 mcg Nebulization BID RT    And    budesonide (PULMICORT) 500 mcg/2 ml nebulizer suspension  500 mcg Nebulization BID RT       Michael Jordan MD

## 2018-04-03 NOTE — PROGRESS NOTES
Problem: Mobility Impaired (Adult and Pediatric)  Goal: *Acute Goals and Plan of Care (Insert Text)  Physical Therapy Goals  Revised 4/2/2018  1. Patient will move from supine to sit and sit to supine  and roll side to side in bed with independence within 7 day(s). 2.  Patient will transfer from bed to chair and chair to bed with modified independence using the least restrictive device within 7 day(s). 3.  Patient will perform sit to stand with modified independence within 7 day(s). 4.  Patient will ambulate with modified independence for 150 feet with the least restrictive device within 7 day(s). Physical Therapy Goals  Initiated 3/16/2018  1. Patient will move from supine to sit and sit to supine  in bed with supervision/set-up within 7 day(s). 2.  Patient will transfer from bed to chair and chair to bed with supervision/set-up using the least restrictive device within 7 day(s). 3.  Patient will perform sit to stand with minimal assistance/contact guard assist within 7 day(s). 4.  Patient will ambulate with minimal assistance/contact guard assist for 50 feet with the least restrictive device within 7 day(s). physical Therapy TREATMENT  Patient: Alex Hemphill (21 y.o. female)  Date: 4/3/2018  Diagnosis: Peritonitis ([address] Peritonitis ([address]       Precautions: Fall (abd drain)  Chart, physical therapy assessment, plan of care and goals were reviewed. ASSESSMENT:  Pt initially requested to defer ambulation until after she had pain medication for abdominal discomfort, notified RN and returned after pt received her medication, pt was agreeable and reported minimal pain, 3/10, with activity, pt transferred to sitting EOB with use of bed rail with modified independence, required 3 attempts and moderate assist x 2 to stand, raised height of bed to facilitate ease of transfer, pt tolerated ambulation with rolling walker x 100 feet with CGA x 2. Pt remained up in chair at end of session. Encouraged pt to increase her time OOB and sit in chair for  meals. Anticipate pt will be appropriate for discharge home with family support with home health PT. Progression toward goals:  []    Improving appropriately and progressing toward goals  [x]    Improving slowly and progressing toward goals  []    Not making progress toward goals and plan of care will be adjusted     PLAN:  Patient continues to benefit from skilled intervention to address the above impairments. Continue treatment per established plan of care. Discharge Recommendations:  Home Health  Further Equipment Recommendations for Discharge:  rolling walker     SUBJECTIVE:   On first visit this morning patient requested to wait until she gets pain medication for her abdominal pain. Notified RN. Pt agreeable at this time. OBJECTIVE DATA SUMMARY:   Critical Behavior:  Neurologic State: Alert  Orientation Level: Oriented X4  Cognition: Appropriate decision making, Appropriate for age attention/concentration, Appropriate safety awareness, Follows commands  Safety/Judgement: Awareness of environment  Functional Mobility Training:  Bed Mobility:     Supine to Sit: Modified independent, uses bed rail     Scooting: Modified independent        Transfers:  Sit to Stand: Moderate assistance;Assist x2; Additional time, required 3 attempts and bed raised to facilitate ease of transfer   Stand to Sit: Contact guard assistance;Assist x1                             Balance:  Sitting: Intact  Standing: Impaired  Standing - Static: Good  Standing - Dynamic : Fair (with walker)  Ambulation/Gait Training:  Distance (ft): 100 Feet (ft)  Assistive Device: Walker, rolling;Gait belt  Ambulation - Level of Assistance: Contact guard assistance;Assist x2        Gait Abnormalities: Antalgic;Decreased step clearance              Speed/Vanita: Slow  Step Length: Left shortened;Right shortened              Pain:  Pain Scale 1: Numeric (0 - 10)  Pain Intensity 1: 3  Location: abdomen  Intervention: see MAR              Activity Tolerance:   Gradually improving     After treatment:   [x]    Patient left in no apparent distress sitting up in chair  []    Patient left in no apparent distress in bed  [x]    Call bell left within reach  [x]    Nursing notified  []    Caregiver present  []    Bed alarm activated    COMMUNICATION/COLLABORATION:   The patients plan of care was discussed with: Registered Nurse    Bertha Mayberry   Time Calculation: 12 mins

## 2018-04-04 PROBLEM — R10.84 GENERALIZED ABDOMINAL PAIN: Status: ACTIVE | Noted: 2018-04-04

## 2018-04-04 PROBLEM — R18.8 OTHER ASCITES: Status: ACTIVE | Noted: 2018-04-04

## 2018-04-04 PROBLEM — K59.09 OTHER CONSTIPATION: Status: ACTIVE | Noted: 2018-04-04

## 2018-04-04 LAB
ALBUMIN SERPL-MCNC: 1 G/DL (ref 3.5–5)
ALBUMIN/GLOB SERPL: 0.2 {RATIO} (ref 1.1–2.2)
ALP SERPL-CCNC: 97 U/L (ref 45–117)
ALT SERPL-CCNC: 7 U/L (ref 12–78)
ANION GAP SERPL CALC-SCNC: 7 MMOL/L (ref 5–15)
AST SERPL-CCNC: 14 U/L (ref 15–37)
BILIRUB SERPL-MCNC: 0.2 MG/DL (ref 0.2–1)
BUN SERPL-MCNC: 9 MG/DL (ref 6–20)
BUN/CREAT SERPL: 3 (ref 12–20)
CALCIUM SERPL-MCNC: 8.1 MG/DL (ref 8.5–10.1)
CHLORIDE SERPL-SCNC: 105 MMOL/L (ref 97–108)
CO2 SERPL-SCNC: 27 MMOL/L (ref 21–32)
CREAT SERPL-MCNC: 3.55 MG/DL (ref 0.55–1.02)
ERYTHROCYTE [DISTWIDTH] IN BLOOD BY AUTOMATED COUNT: 22.5 % (ref 11.5–14.5)
GLOBULIN SER CALC-MCNC: 4.4 G/DL (ref 2–4)
GLUCOSE BLD STRIP.AUTO-MCNC: 102 MG/DL (ref 65–100)
GLUCOSE BLD STRIP.AUTO-MCNC: 110 MG/DL (ref 65–100)
GLUCOSE BLD STRIP.AUTO-MCNC: 88 MG/DL (ref 65–100)
GLUCOSE BLD STRIP.AUTO-MCNC: 99 MG/DL (ref 65–100)
GLUCOSE SERPL-MCNC: 74 MG/DL (ref 65–100)
HCT VFR BLD AUTO: 27.6 % (ref 35–47)
HGB BLD-MCNC: 8.4 G/DL (ref 11.5–16)
MAGNESIUM SERPL-MCNC: 1.9 MG/DL (ref 1.6–2.4)
MCH RBC QN AUTO: 26.7 PG (ref 26–34)
MCHC RBC AUTO-ENTMCNC: 30.4 G/DL (ref 30–36.5)
MCV RBC AUTO: 87.6 FL (ref 80–99)
NRBC # BLD: 0 K/UL (ref 0–0.01)
NRBC BLD-RTO: 0 PER 100 WBC
PHOSPHATE SERPL-MCNC: 1.7 MG/DL (ref 2.6–4.7)
PLATELET # BLD AUTO: 267 K/UL (ref 150–400)
PMV BLD AUTO: 10.7 FL (ref 8.9–12.9)
POTASSIUM SERPL-SCNC: 4.2 MMOL/L (ref 3.5–5.1)
PROT SERPL-MCNC: 5.4 G/DL (ref 6.4–8.2)
RBC # BLD AUTO: 3.15 M/UL (ref 3.8–5.2)
SERVICE CMNT-IMP: ABNORMAL
SERVICE CMNT-IMP: ABNORMAL
SERVICE CMNT-IMP: NORMAL
SERVICE CMNT-IMP: NORMAL
SODIUM SERPL-SCNC: 139 MMOL/L (ref 136–145)
WBC # BLD AUTO: 4.4 K/UL (ref 3.6–11)

## 2018-04-04 PROCEDURE — 74011250637 HC RX REV CODE- 250/637: Performed by: INTERNAL MEDICINE

## 2018-04-04 PROCEDURE — 94664 DEMO&/EVAL PT USE INHALER: CPT

## 2018-04-04 PROCEDURE — 80053 COMPREHEN METABOLIC PANEL: CPT | Performed by: INTERNAL MEDICINE

## 2018-04-04 PROCEDURE — 97116 GAIT TRAINING THERAPY: CPT

## 2018-04-04 PROCEDURE — 83735 ASSAY OF MAGNESIUM: CPT | Performed by: INTERNAL MEDICINE

## 2018-04-04 PROCEDURE — 74011250636 HC RX REV CODE- 250/636: Performed by: HOSPITALIST

## 2018-04-04 PROCEDURE — 74011250637 HC RX REV CODE- 250/637: Performed by: HOSPITALIST

## 2018-04-04 PROCEDURE — 36415 COLL VENOUS BLD VENIPUNCTURE: CPT | Performed by: INTERNAL MEDICINE

## 2018-04-04 PROCEDURE — 85027 COMPLETE CBC AUTOMATED: CPT | Performed by: INTERNAL MEDICINE

## 2018-04-04 PROCEDURE — 84100 ASSAY OF PHOSPHORUS: CPT | Performed by: INTERNAL MEDICINE

## 2018-04-04 PROCEDURE — 74011250637 HC RX REV CODE- 250/637: Performed by: NURSE PRACTITIONER

## 2018-04-04 PROCEDURE — 65270000029 HC RM PRIVATE

## 2018-04-04 PROCEDURE — 74011250636 HC RX REV CODE- 250/636: Performed by: INTERNAL MEDICINE

## 2018-04-04 PROCEDURE — 74011000250 HC RX REV CODE- 250: Performed by: HOSPITALIST

## 2018-04-04 PROCEDURE — 74011000258 HC RX REV CODE- 258: Performed by: INTERNAL MEDICINE

## 2018-04-04 PROCEDURE — 77010033678 HC OXYGEN DAILY

## 2018-04-04 PROCEDURE — 74011636637 HC RX REV CODE- 636/637: Performed by: HOSPITALIST

## 2018-04-04 PROCEDURE — 94640 AIRWAY INHALATION TREATMENT: CPT

## 2018-04-04 PROCEDURE — 82962 GLUCOSE BLOOD TEST: CPT

## 2018-04-04 RX ORDER — SODIUM,POTASSIUM PHOSPHATES 280-250MG
2 POWDER IN PACKET (EA) ORAL 2 TIMES DAILY
Status: COMPLETED | OUTPATIENT
Start: 2018-04-04 | End: 2018-04-04

## 2018-04-04 RX ORDER — OXYCODONE HYDROCHLORIDE 5 MG/1
10 TABLET ORAL
Status: DISCONTINUED | OUTPATIENT
Start: 2018-04-04 | End: 2018-04-18

## 2018-04-04 RX ORDER — HYDROMORPHONE HYDROCHLORIDE 2 MG/ML
0.5 INJECTION, SOLUTION INTRAMUSCULAR; INTRAVENOUS; SUBCUTANEOUS ONCE
Status: COMPLETED | OUTPATIENT
Start: 2018-04-04 | End: 2018-04-04

## 2018-04-04 RX ORDER — POLYETHYLENE GLYCOL 3350 17 G/17G
17 POWDER, FOR SOLUTION ORAL DAILY
Status: DISCONTINUED | OUTPATIENT
Start: 2018-04-05 | End: 2018-04-20 | Stop reason: HOSPADM

## 2018-04-04 RX ADMIN — Medication 10 ML: at 05:22

## 2018-04-04 RX ADMIN — BUDESONIDE 500 MCG: 0.5 INHALANT RESPIRATORY (INHALATION) at 21:48

## 2018-04-04 RX ADMIN — AMLODIPINE BESYLATE 5 MG: 5 TABLET ORAL at 09:52

## 2018-04-04 RX ADMIN — Medication 10 ML: at 21:10

## 2018-04-04 RX ADMIN — ARFORMOTEROL TARTRATE 15 MCG: 15 SOLUTION RESPIRATORY (INHALATION) at 21:48

## 2018-04-04 RX ADMIN — HYDROMORPHONE HYDROCHLORIDE 0.5 MG: 2 INJECTION INTRAMUSCULAR; INTRAVENOUS; SUBCUTANEOUS at 18:11

## 2018-04-04 RX ADMIN — HYDROXYCHLOROQUINE SULFATE 200 MG: 200 TABLET, FILM COATED ORAL at 18:12

## 2018-04-04 RX ADMIN — PANTOPRAZOLE SODIUM 40 MG: 40 TABLET, DELAYED RELEASE ORAL at 06:36

## 2018-04-04 RX ADMIN — APIXABAN 5 MG: 5 TABLET, FILM COATED ORAL at 09:51

## 2018-04-04 RX ADMIN — HYDROXYCHLOROQUINE SULFATE 200 MG: 200 TABLET, FILM COATED ORAL at 09:52

## 2018-04-04 RX ADMIN — HYDROMORPHONE HYDROCHLORIDE 0.5 MG: 2 INJECTION INTRAMUSCULAR; INTRAVENOUS; SUBCUTANEOUS at 02:16

## 2018-04-04 RX ADMIN — POTASSIUM & SODIUM PHOSPHATES POWDER PACK 280-160-250 MG 2 PACKET: 280-160-250 PACK at 18:12

## 2018-04-04 RX ADMIN — HYDROMORPHONE HYDROCHLORIDE 0.5 MG: 2 INJECTION INTRAMUSCULAR; INTRAVENOUS; SUBCUTANEOUS at 12:06

## 2018-04-04 RX ADMIN — POTASSIUM & SODIUM PHOSPHATES POWDER PACK 280-160-250 MG 2 PACKET: 280-160-250 PACK at 12:06

## 2018-04-04 RX ADMIN — SENNOSIDES 17.2 MG: 8.6 TABLET, FILM COATED ORAL at 21:08

## 2018-04-04 RX ADMIN — ONDANSETRON 4 MG: 2 INJECTION INTRAMUSCULAR; INTRAVENOUS at 09:52

## 2018-04-04 RX ADMIN — CASTOR OIL AND BALSAM, PERU: 788; 87 OINTMENT TOPICAL at 14:59

## 2018-04-04 RX ADMIN — HYDROMORPHONE HYDROCHLORIDE 0.5 MG: 2 INJECTION INTRAMUSCULAR; INTRAVENOUS; SUBCUTANEOUS at 14:48

## 2018-04-04 RX ADMIN — PREDNISONE 5 MG: 5 TABLET ORAL at 09:50

## 2018-04-04 RX ADMIN — CARVEDILOL 12.5 MG: 12.5 TABLET, FILM COATED ORAL at 18:11

## 2018-04-04 RX ADMIN — ARFORMOTEROL TARTRATE 15 MCG: 15 SOLUTION RESPIRATORY (INHALATION) at 07:27

## 2018-04-04 RX ADMIN — CASTOR OIL AND BALSAM, PERU: 788; 87 OINTMENT TOPICAL at 21:11

## 2018-04-04 RX ADMIN — BUDESONIDE 500 MCG: 0.5 INHALANT RESPIRATORY (INHALATION) at 07:27

## 2018-04-04 RX ADMIN — AMITRIPTYLINE HYDROCHLORIDE 25 MG: 10 TABLET, FILM COATED ORAL at 21:08

## 2018-04-04 RX ADMIN — HYDROMORPHONE HYDROCHLORIDE 0.5 MG: 2 INJECTION INTRAMUSCULAR; INTRAVENOUS; SUBCUTANEOUS at 21:08

## 2018-04-04 RX ADMIN — HYDROMORPHONE HYDROCHLORIDE 0.5 MG: 2 INJECTION INTRAMUSCULAR; INTRAVENOUS; SUBCUTANEOUS at 05:22

## 2018-04-04 RX ADMIN — SODIUM CHLORIDE 1 G: 900 INJECTION, SOLUTION INTRAVENOUS at 19:53

## 2018-04-04 RX ADMIN — CARVEDILOL 12.5 MG: 12.5 TABLET, FILM COATED ORAL at 09:50

## 2018-04-04 RX ADMIN — APIXABAN 5 MG: 5 TABLET, FILM COATED ORAL at 18:11

## 2018-04-04 RX ADMIN — GEMFIBROZIL 300 MG: 600 TABLET ORAL at 09:51

## 2018-04-04 RX ADMIN — Medication 2500 MCG: at 09:50

## 2018-04-04 RX ADMIN — HYDROMORPHONE HYDROCHLORIDE 0.5 MG: 2 INJECTION, SOLUTION INTRAMUSCULAR; INTRAVENOUS; SUBCUTANEOUS at 10:33

## 2018-04-04 RX ADMIN — HYDROMORPHONE HYDROCHLORIDE 0.5 MG: 2 INJECTION INTRAMUSCULAR; INTRAVENOUS; SUBCUTANEOUS at 08:11

## 2018-04-04 NOTE — PROGRESS NOTES
Daily Progress Note  [facility] Surgery at [address] Date: 3/11/2018    Subjective:     Last 24 hrs: Having pain along her lower abdomen. She notes drain has intermittent episodes of large amounts of output. Per records, drain without output of 250 mL yesterday. Radiology note reviewed from 3/31, there was plan for reassessment of the drain on April 3 and possible upsize under fluoro with irrigation or accordion placement to keep the cavity closed. Per IR staff, this was cancelled by Dr. Anabell West who stated that drain output was adequate. WBC 4.4, she is on cefepime and eliquis. Objective:     Blood pressure (!) 141/100, pulse (!) 102, temperature 98.6 °F (37 °C), resp. rate 16, height 5' 5\" (1.651 m), weight 72.2 kg (159 lb 2.8 oz), SpO2 98 %, unknown if currently breastfeeding. Temp (24hrs), Av.4 °F (36.9 °C), Min:98.3 °F (36.8 °C), Max:98.6 °F (37 °C)      _____________________  Physical Exam:     Alert and Oriented, sitting up on side of bed, no acute distress. Cardiovascular: RRR, +2 bilateral LE peripheral edema  Lungs:CTAB   Abdomen: soft, tender across lower abdomen. Drain in place with dark ss output to gravity drainage.         Assessment:   Principal Problem:    Peritonitis ([address] (3/11/2018)    intraabdominal fluid collection        Plan:     Monitor drain output  Continue abx  Further evaluation of drain per Dr. July Hernández, [address] [facility] [address],  [address], [facility], [place]  [phone]    Data Review:    Recent Labs      18   0740  18   0246   WBC  4.4  4.4   HGB  8.4*  9.0*   HCT  27.6*  30.3*   PLT  267  264     Recent Labs      18   0740  18   0246   NA  139  140   K  4.2  3.9   CL  105  105   CO2  27  28   GLU  74  72   BUN  9  9   CREA  3.55*  3.72*   CA  8.1*  7.9*   MG  1.9  1.6   PHOS  1.7*  1.8*   ALB  1.0*  1.1*   TBILI  0.2  0.2   SGOT  14*  10*   ALT  7* 7*     No results for input(s): AML, LPSE in the last 72 hours.         ______________________  Medications:    Current Facility-Administered Medications   Medication Dose Route Frequency    potassium, sodium phosphates (NEUTRA-PHOS) packet 2 Packet  2 Packet Oral BID    insulin lispro (HUMALOG) injection   SubCUTAneous AC&HS    senna (SENOKOT) tablet 17.2 mg  2 Tab Oral QHS    balsam peru-castor oil (VENELEX)  mg/gram ointment   Topical Q8H    amLODIPine (NORVASC) tablet 5 mg  5 mg Oral DAILY    carvedilol (COREG) tablet 12.5 mg  12.5 mg Oral BID WITH MEALS    dextrose 5% and 0.9% NaCl infusion  25 mL/hr IntraVENous CONTINUOUS    hydrALAZINE (APRESOLINE) 20 mg/mL injection 5 mg  5 mg IntraVENous Q6H PRN    cefepime (MAXIPIME) 1 g in 0.9% sodium chloride (MBP/ADV) 50 mL ADV  1 g IntraVENous Q24H    apixaban (ELIQUIS) tablet 5 mg  5 mg Oral BID    0.9% sodium chloride infusion 250 mL  250 mL IntraVENous PRN    HYDROmorphone (DILAUDID) injection 0.5 mg  0.5 mg IntraVENous Q3H PRN    glucose chewable tablet 16 g  4 Tab Oral PRN    dextrose (D50W) injection syrg 12.5-25 g  12.5-25 g IntraVENous PRN    glucagon (GLUCAGEN) injection 1 mg  1 mg IntraMUSCular PRN    epoetin pia (EPOGEN;PROCRIT) 14,000 Units  14,000 Units SubCUTAneous DIALYSIS TUE, THU & SAT    predniSONE (DELTASONE) tablet 5 mg  5 mg Oral DAILY    pantoprazole (PROTONIX) tablet 40 mg  40 mg Oral ACB    cyanocobalamin (VITAMIN B12) tablet 2,500 mcg  2,500 mcg Oral DAILY    hydroxychloroquine (PLAQUENIL) tablet 200 mg  200 mg Oral BID    albuterol (PROVENTIL VENTOLIN) nebulizer solution 2.5 mg  2.5 mg Nebulization Q4H PRN    gemfibrozil (LOPID) tablet 300 mg  300 mg Oral DAILY    oxyCODONE IR (ROXICODONE) tablet 2.5-5 mg  2.5-5 mg Oral Q6H PRN    amitriptyline (ELAVIL) tablet 25 mg  25 mg Oral QHS    sodium chloride (NS) flush 5-10 mL  5-10 mL IntraVENous Q8H    sodium chloride (NS) flush 5-10 mL  5-10 mL IntraVENous PRN    ondansetron (ZOFRAN) injection 4 mg  4 mg IntraVENous Q4H PRN    arformoterol (BROVANA) neb solution 15 mcg  15 mcg Nebulization BID RT    And    budesonide (PULMICORT) 500 mcg/2 ml nebulizer suspension  500 mcg Nebulization BID RT

## 2018-04-04 NOTE — CONSULTS
Palliative Medicine Consult  [place]: [phone])    Patient Name: Jessica Guerra  YOB: 1985    Date of Initial Consult: 4/4/18  Reason for Consult: Care decisions  Requesting Provider: Dr. Brock Badillo  Primary Care Physician: Dwayne Blue, KHANH     SUMMARY:   Jessica Guerra is a 29 y.o. with a past history of SLE, end-stage renal disease (on hemodialysis, TTS), pulmonary embolism, on Eliquis (2012), hyperlipidemia, hypertension, recent Candida peritonitis, chronic bilateral pain, who was admitted on 3/11/2018 from home with a diagnosis of increasing abdominal pain. Current medical issues leading to Palliative Medicine involvement include: complicated hospital course with E'coli peritonitis, loculated ascites s/p drain placement, being followed by general surgery. We are asked to help with pain management. PALLIATIVE DIAGNOSES:   1. Generalized abdominal pain  2. Constipation  3. debility       PLAN:   1. Abdominal pain- mid to lower abdominal pain, sharp, colicky. Pain present only since admission. Could be a combination of her loculated ascites and gas pockets. She reports excruciating 10/10 pain that rises very quickly and goes away with IV dilaudid. Discussed that we do need to come off Iv dilaudid and try oral medication. She tried 2.5 mg Oxycodone which did not help. That is a small dose while patient is receiving 0.5 mg dilaudid. Will order 10 mg Oxycodone every 6 hours as needed. 1. RN to try Oxycodone 10 mg po first when patient asks for pain medication and use IV dilaudid only for break through. 2. Constipation- complains of irregular bowel movements. On senna, start Miralax. Constipation can certainly worsen her pain.       3. Communicated plan of care with: Palliative IDT       GOALS OF CARE / TREATMENT PREFERENCES:     GOALS OF CARE:  Patient/Health Care Proxy Stated Goals: Cure      TREATMENT PREFERENCES:   Code Status: Full Code    Advance Care Planning:  Advance Care Planning 4/4/2018   Patient's Healthcare Decision Maker is: Legal Next of Kin   Primary Decision Maker Name Mali Grace   Primary Decision Maker Phone Number [phone]   Primary Decision Maker Relationship to Patient Parent   Confirm Advance Directive -   Patient Would Like to Complete Advance Directive -           Other Instructions: Other:    As far as possible, the palliative care team has discussed with patient / health care proxy about goals of care / treatment preferences for patient. HISTORY:         HPI/SUBJECTIVE:    The patient is:   [x] Verbal and participatory  [] Non-participatory due to:     Pleasant young woman, her aunt and cousin are in the room providing her company. She just finished working with PT, did well. Abdominal pain- no prior pain history. Has colicky abdominal pain that is relieved with IV dilaudid. Pain lasts for a few minutes only. Very intense when it comes on like gas pains.     Clinical Pain Assessment (nonverbal scale for severity on nonverbal patients):   Clinical Pain Assessment  Severity: 3  Location: abdomen  Character: sharp, colicky  Duration: weeks  Effect: functional  Factors: none  Frequency: on and off, intermittent          Duration: for how long has pt been experiencing pain (e.g., 2 days, 1 month, years)  Frequency: how often pain is an issue (e.g., several times per day, once every few days, constant)     FUNCTIONAL ASSESSMENT:     Palliative Performance Scale (PPS):  PPS: 80       PSYCHOSOCIAL/SPIRITUAL SCREENING:     Palliative IDT has assessed this patient for cultural preferences / practices and a referral made as appropriate to needs (Cultural Services, Patient Advocacy, Ethics, etc.)    Advance Care Planning:  Advance Care Planning 4/4/2018   Patient's Healthcare Decision Maker is: Legal Next of Maria Antonia 70   Primary Decision Maker Name Mali Grace   Primary Decision Maker Phone Number [phone]   Primary Decision Maker Relationship to Patient Parent   Confirm Advance Directive -   Patient Would Like to Complete Advance Directive -       Any spiritual / Islam concerns:  [] Yes /  [x] No    Caregiver Burnout:  [] Yes /  [x] No /  [] No Caregiver Present      Anticipatory grief assessment:   [x] Normal  / [] Maladaptive       ESAS Anxiety: Anxiety: 0    ESAS Depression: Depression: 0        REVIEW OF SYSTEMS:     Positive and pertinent negative findings in ROS are noted above in HPI. The following systems were [] reviewed / [] unable to be reviewed as noted in HPI  Other findings are noted below. Systems: constitutional, ears/nose/mouth/throat, respiratory, gastrointestinal, genitourinary, musculoskeletal, integumentary, neurologic, psychiatric, endocrine. Positive findings noted below. Modified ESAS Completed by: provider   Fatigue: 0 Drowsiness: 0   Depression: 0 Pain: 3   Anxiety: 0 Nausea: 0   Anorexia: 1 Dyspnea: 0     Constipation: Yes     Stool Occurrence(s): 1        PHYSICAL EXAM:     From RN flowsheet:  Wt Readings from Last 3 Encounters:   04/02/18 159 lb 2.8 oz (72.2 kg)   03/05/18 150 lb (68 kg)   01/29/18 153 lb (69.4 kg)     Blood pressure 127/81, pulse 95, temperature 98.6 °F (37 °C), resp. rate 16, height 5' 5\" (1.651 m), weight 159 lb 2.8 oz (72.2 kg), SpO2 98 %, unknown if currently breastfeeding. Pain Scale 1: Numeric (0 - 10)  Pain Intensity 1: 0  Pain Onset 1: acute  Pain Location 1: Abdomen  Pain Orientation 1: Lower  Pain Description 1: Aching  Pain Intervention(s) 1: Repositioned  Last bowel movement, if known:     Constitutional: alert, oriented  Eyes: pupils equal, anicteric  ENMT: no nasal discharge, moist mucous membranes  Cardiovascular: regular rhythm, distal pulses intact  Respiratory: breathing not labored, symmetric  Gastrointestinal: soft, mild distention, some tenderness around the drain.   Musculoskeletal: no deformity, no tenderness to palpation  Skin: warm, dry  Neurologic: following commands, moving all extremities  Psychiatric: full affect, no hallucinations  Other:       HISTORY:     Principal Problem:    Peritonitis ([address] (3/11/2018)      Past Medical History:   Diagnosis Date    Anemia     secondary to lupus    Asthma     no inhaler use in past 2 to 3 years    Carditis     Chronic kidney disease     ESRD    Chronic pain     DDD (degenerative disc disease), lumbar     ESRD (end stage renal disease) ([address]     GERD (gastroesophageal reflux disease)     Heart failure ([address]     Hemodialysis patient ([address] 2017    [address]  Tuesday,  Thursday,  and Saturday. 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  Poor historian 2018    With medications    Thromboembolus ([address] 2013    lungs    Transfusion history     Last Transfusion 2017  at [facility]      Past Surgical History:   Procedure Laterality Date    HX  SECTION  11/2006    x1    HX OTHER SURGICAL  9/16/15    INSERTION PD CATH; Removed 2017    HX VASCULAR ACCESS Right 2017    Double-Lumen henry catheter upper chest      Family History   Problem Relation Age of Onset    Diabetes Father     Hypertension Father     Cancer Other      aunt with breast cancer    Diabetes Mother       History reviewed, no pertinent family history.   Social History   Substance Use Topics    Smoking status: Never Smoker    Smokeless tobacco: Never Used    Alcohol use No     Allergies   Allergen Reactions    Compazine [Prochlorperazine Edisylate] Nausea and Vomiting and Palpitations      Current Facility-Administered Medications   Medication Dose Route Frequency    potassium, sodium phosphates (NEUTRA-PHOS) packet 2 Packet  2 Packet Oral BID    oxyCODONE IR (ROXICODONE) tablet 10 mg  10 mg Oral Q6H PRN    [START ON 4/5/2018] polyethylene glycol (MIRALAX) packet 17 g  17 g Oral DAILY    insulin lispro (HUMALOG) injection   SubCUTAneous AC&HS    senna (SENOKOT) tablet 17.2 mg  2 Tab Oral QHS    balsam peru-castor oil (VENELEX)  mg/gram ointment   Topical Q8H    amLODIPine (NORVASC) tablet 5 mg  5 mg Oral DAILY    carvedilol (COREG) tablet 12.5 mg  12.5 mg Oral BID WITH MEALS    dextrose 5% and 0.9% NaCl infusion  25 mL/hr IntraVENous CONTINUOUS    hydrALAZINE (APRESOLINE) 20 mg/mL injection 5 mg  5 mg IntraVENous Q6H PRN    cefepime (MAXIPIME) 1 g in 0.9% sodium chloride (MBP/ADV) 50 mL ADV  1 g IntraVENous Q24H    apixaban (ELIQUIS) tablet 5 mg  5 mg Oral BID    0.9% sodium chloride infusion 250 mL  250 mL IntraVENous PRN    HYDROmorphone (DILAUDID) injection 0.5 mg  0.5 mg IntraVENous Q3H PRN    glucose chewable tablet 16 g  4 Tab Oral PRN    dextrose (D50W) injection syrg 12.5-25 g  12.5-25 g IntraVENous PRN    glucagon (GLUCAGEN) injection 1 mg  1 mg IntraMUSCular PRN    epoetin pia (EPOGEN;PROCRIT) 14,000 Units  14,000 Units SubCUTAneous DIALYSIS TUE, THU & SAT    predniSONE (DELTASONE) tablet 5 mg  5 mg Oral DAILY    pantoprazole (PROTONIX) tablet 40 mg  40 mg Oral ACB    cyanocobalamin (VITAMIN B12) tablet 2,500 mcg  2,500 mcg Oral DAILY    hydroxychloroquine (PLAQUENIL) tablet 200 mg  200 mg Oral BID    albuterol (PROVENTIL VENTOLIN) nebulizer solution 2.5 mg  2.5 mg Nebulization Q4H PRN    gemfibrozil (LOPID) tablet 300 mg  300 mg Oral DAILY    amitriptyline (ELAVIL) tablet 25 mg  25 mg Oral QHS    sodium chloride (NS) flush 5-10 mL  5-10 mL IntraVENous Q8H    sodium chloride (NS) flush 5-10 mL  5-10 mL IntraVENous PRN    ondansetron (ZOFRAN) injection 4 mg  4 mg IntraVENous Q4H PRN    arformoterol (BROVANA) neb solution 15 mcg  15 mcg Nebulization BID RT    And    budesonide (PULMICORT) 500 mcg/2 ml nebulizer suspension  500 mcg Nebulization BID RT          LAB AND IMAGING FINDINGS:     Lab Results   Component Value Date/Time    WBC 4.4 04/04/2018 07:40 AM    HGB 8.4 (L) 04/04/2018 07:40 AM    PLATELET 552 17/58/6810 07:40 AM     Lab Results   Component Value Date/Time    Sodium 139 04/04/2018 07:40 AM    Potassium 4.2 04/04/2018 07:40 AM    Chloride 105 04/04/2018 07:40 AM    CO2 27 04/04/2018 07:40 AM    BUN 9 04/04/2018 07:40 AM    Creatinine 3.55 (H) 04/04/2018 07:40 AM    Calcium 8.1 (L) 04/04/2018 07:40 AM    Magnesium 1.9 04/04/2018 07:40 AM    Phosphorus 1.7 (L) 04/04/2018 07:40 AM      Lab Results   Component Value Date/Time    AST (SGOT) 14 (L) 04/04/2018 07:40 AM    Alk. phosphatase 97 04/04/2018 07:40 AM    Protein, total 5.4 (L) 04/04/2018 07:40 AM    Albumin 1.0 (L) 04/04/2018 07:40 AM    Globulin 4.4 (H) 04/04/2018 07:40 AM     Lab Results   Component Value Date/Time    INR 1.3 (H) 03/25/2018 04:34 AM    Prothrombin time 13.5 (H) 03/25/2018 04:34 AM    aPTT 31.3 03/25/2018 04:34 AM      Lab Results   Component Value Date/Time    Iron 22 (L) 03/15/2018 10:32 AM    TIBC 52 (L) 03/15/2018 10:32 AM    Iron % saturation 42 03/15/2018 10:32 AM    Ferritin 548 (H) 03/15/2018 10:32 AM      No results found for: PH, PCO2, PO2  No components found for: [place]   Lab Results   Component Value Date/Time    CK 20 (L) 04/25/2013 05:20 PM    CK - MB <0.5 (L) 04/25/2013 05:20 PM                Total time: 70m  Counseling / coordination time, spent as noted above:  60m  > 50% counseling / coordination?: y    Prolonged service was provided for  []30 min   []75 min in face to face time in the presence of the patient, spent as noted above. Time Start:   Time End:   Note: this can only be billed with 52363 (initial) or 68770 (follow up). If multiple start / stop times, list each separately.

## 2018-04-04 NOTE — PROGRESS NOTES
Gen Surg @ [facility]  Attending addendum:  I supervised the NP Monica Lamar) and reviewed the progress note. We discussed the plan of care. I visited the pt independently. Patient Vitals for the past 12 hrs:   Temp Pulse Resp BP SpO2   04/04/18 1416 98.6 °F (37 °C) 95 16 127/81 98 %   04/04/18 0944 98.6 °F (37 °C) (!) 102 16 (!) 141/100 98 %   04/04/18 0724 - - - - 93 %   04/04/18 0445 98.3 °F (36.8 °C) 98 15 (!) 142/95 93 %        Physical Exam: deferred  Gen   Lungs   Abd Drainage is serosanguinous     Hospital Problems as of 4/4/2018  Date Reviewed: 3/11/2018          Codes Class Noted - Resolved POA    * (Principal)Peritonitis ([facility] ICD-10-CM: K65.9  ICD-9-CM: 567.9  3/11/2018 - Present Yes              I agree with the APC's assessment and plan with the following additions/modifications:  Request IR to reposition and upsize catheter.     Signed By: Agustina Sanon MD     April 4, 2018

## 2018-04-04 NOTE — PROGRESS NOTES
Patient name: Garrison Urena  MRN: [id number]    Nephrology Progress note:    Assessment:  ESRD: on HD TTS - [facility]     E. Coli Bacteremia: source intra-abd source/ E. Coli peritonitis. IV zosyn. Repeat Bld Cx 3/11-> Neg. Recent hx of Candida peritonitis. 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 Pt frustrated. Ct to require IV D5NS at 25cc/hr-> BS b/w 70-90.  Pt reports eating more    ROS:  No n/v  +Abd pain         Exam:  Visit Vitals    BP (!) 141/100    Pulse (!) 102    Temp 98.6 °F (37 °C)    Resp 16    Ht 5' 5\" (1.651 m)    Wt 72.2 kg (159 lb 2.8 oz)    SpO2 98%    BMI 26.49 kg/m2     Wt Readings from Last 3 Encounters:   04/02/18 72.2 kg (159 lb 2.8 oz)   03/05/18 68 kg (150 lb)   01/29/18 69.4 kg (153 lb)       Intake/Output Summary (Last 24 hours) at 04/04/18 1145  Last data filed at 04/03/18 2320   Gross per 24 hour   Intake                0 ml   Output              250 ml   Net             -250 ml       NAD  CTAB/l  RUE/RLE edema  Abd drain, mild TTP      Labs/Data:    Lab Results   Component Value Date/Time    WBC 4.4 04/04/2018 07:40 AM    Hemoglobin (POC) 7.8 (L) 01/19/2018 12:30 PM    HGB 8.4 (L) 04/04/2018 07:40 AM    Hematocrit (POC) 23 (L) 01/19/2018 12:30 PM    HCT 27.6 (L) 04/04/2018 07:40 AM    PLATELET 172 06/07/6755 07:40 AM    MCV 87.6 04/04/2018 07:40 AM       Lab Results   Component Value Date/Time    Sodium 139 04/04/2018 07:40 AM    Potassium 4.2 04/04/2018 07:40 AM    Chloride 105 04/04/2018 07:40 AM    CO2 27 04/04/2018 07:40 AM    Anion gap 7 04/04/2018 07:40 AM    Glucose 74 04/04/2018 07:40 AM    BUN 9 04/04/2018 07:40 AM    Creatinine 3.55 (H) 04/04/2018 07:40 AM    BUN/Creatinine ratio 3 (L) 04/04/2018 07:40 AM    GFR est AA 18 (L) 04/04/2018 07:40 AM    GFR est non-AA 15 (L) 04/04/2018 07:40 AM    Calcium 8.1 (L) 04/04/2018 07:40 AM        Discussed with patient

## 2018-04-04 NOTE — PROGRESS NOTES
Bedside shift change report given to Hero Sanchez (oncoming nurse) by Lacey Tavarez RN (offgoing nurse). Report included the following information SBAR, MAR and Recent Results.

## 2018-04-04 NOTE — PROGRESS NOTES
Cm continuing to follow for transitions of care, discussed patient during rounds with the team. There are multiple specialists involved in Ms. Blackwood' plan of care and she will not be ready for discharge for the near future. Cm will be available to assist with any needs as they arise.   [employer] , [place]

## 2018-04-04 NOTE — PROGRESS NOTES
Spiritual Care Assessment/Progress Note  [facility]      NAME: Carmen Hopper      MRN: [id number]  AGE: 29 y.o. SEX: female  Gnosticist Affiliation: Yarsani   Language: English     4/4/2018     Total Time (in minutes): 8     Spiritual Assessment begun in [facility] 5W1 ORTHO [facility] through conversation with:         [x]Patient        [] Family    [] Friend(s)        Reason for Consult: Palliative Care, Initial/Spiritual Assessment     Spiritual beliefs: (Please include comment if needed)     [x] Involved in a prince tradition/spiritual practice:     [] Supported by a prince community:      [] Claims no spiritual orientation:      [] Seeking spiritual identity:           [] Adheres to an individual form of spirituality:      [] Not able to assess:                     Identified resources for coping:      [x] Prayer                  [] Devotional reading               [] Music                  [] Guided Imagery     [x] Family/friends                 [] Pet visits     [] Other:        Interventions offered during this visit: (See comments for more details)    Patient Interventions: Affirmation of prince, Affirmation of emotions/emotional suffering, Prayer (assurance of)           Plan of Care:     [] Discuss Spiritual/Cultural needs    [] Support AMD and/or advance care planning process      [] Support grieving process   [] Coordinate Rites/Rituals    [] Coordination with community clergy   [] No spiritual needs identified at this time   [] Detailed Plan of Care below (See Comments)  [] Make referral to Music Therapy  [] Make referral to Pet Therapy     [] Make referral to Addiction services  [] Make referral to [facility]  [] Make referral to Spiritual Care Partner  [] No future visits requested             Comments:  visit per palliative consult. Pt was thankful for visit and mentioned being grateful that her mom and daughter visit her every day. Let her know of  availability.   follow up as needed.      Jhon Ferrell, [facility]   [phone])

## 2018-04-04 NOTE — PROGRESS NOTES
Problem: Mobility Impaired (Adult and Pediatric)  Goal: *Acute Goals and Plan of Care (Insert Text)  Physical Therapy Goals  Revised 4/2/2018  1. Patient will move from supine to sit and sit to supine  and roll side to side in bed with independence within 7 day(s). 2.  Patient will transfer from bed to chair and chair to bed with modified independence using the least restrictive device within 7 day(s). 3.  Patient will perform sit to stand with modified independence within 7 day(s). 4.  Patient will ambulate with modified independence for 150 feet with the least restrictive device within 7 day(s). Physical Therapy Goals  Initiated 3/16/2018  1. Patient will move from supine to sit and sit to supine  in bed with supervision/set-up within 7 day(s). 2.  Patient will transfer from bed to chair and chair to bed with supervision/set-up using the least restrictive device within 7 day(s). 3.  Patient will perform sit to stand with minimal assistance/contact guard assist within 7 day(s). 4.  Patient will ambulate with minimal assistance/contact guard assist for 50 feet with the least restrictive device within 7 day(s). physical Therapy TREATMENT  Patient: Delmis Enciso (64 y.o. female)  Date: 4/4/2018  Diagnosis: Peritonitis ([address] Peritonitis ([address]       Precautions: Fall (abd drain)  Chart, physical therapy assessment, plan of care and goals were reviewed. ASSESSMENT:  Patient agreeable to OOB tx. Still requiring MOD A x 2 for sit to stand but once in standing demos good balance standing with RW support. Noted IV bleeding after transfer, still intact but alerted nursing. Wrapped temporarily to allow patient to perform gait training. Improved endurance to 150' today with two standing rest breaks due to fatigue. Returned to room and noted increased bleeding from IV, RN present as well as Palliative medicine for consult so therex deferred. Cont to benefit from skilled PT.  Recommend working on sit to stand repetitions. Progression toward goals:  [x]    Improving appropriately and progressing toward goals  []    Improving slowly and progressing toward goals  []    Not making progress toward goals and plan of care will be adjusted     PLAN:  Patient continues to benefit from skilled intervention to address the above impairments. Continue treatment per established plan of care. Discharge Recommendations:  Home Health  Further Equipment Recommendations for Discharge:  TBD closer to d/c     SUBJECTIVE:   Patient stated I'd like to get up and walk, thanks for coming.     OBJECTIVE DATA SUMMARY:   Critical Behavior:  Neurologic State: Alert  Orientation Level: Appropriate for age  Cognition: Appropriate decision making  Safety/Judgement: Awareness of environment  Functional Mobility Training:  Bed Mobility:     Supine to Sit: Modified independent              Transfers:  Sit to Stand: Moderate assistance;Assist x2  Stand to Sit: Contact guard assistance                             Balance:  Sitting: Intact  Standing: Impaired  Standing - Static: Good  Standing - Dynamic : Fair  Ambulation/Gait Training:  Distance (ft): 150 Feet (ft) (two standing breaks)  Assistive Device: Walker, rolling;Gait belt  Ambulation - Level of Assistance: Contact guard assistance     Activity Tolerance:   Fair - 150' with RW and CGA    Please refer to the flowsheet for vital signs taken during this treatment.   After treatment:   []    Patient left in no apparent distress sitting up in chair  [x]    Patient left in no apparent distress in bed  [x]    Call bell left within reach  [x]    Nursing notified  [x]    Caregiver present  []    Bed alarm activated    COMMUNICATION/COLLABORATION:   The patients plan of care was discussed with: Registered Nurse    Eulogio Torres, PT   Time Calculation: 16 mins

## 2018-04-04 NOTE — PROGRESS NOTES
Hospitalist Progress Note            This is a 29-year-old female with multiple medical problems including SLE, end-stage renal disease (on hemodialysis, TTS), pulmonary embolism, on Eliquis (2012), hyperlipidemia, hypertension, recent Candida peritonitis, chronic bilateral pain.  She comes over here because of abdominal pain since last 4 or 5 days. Daily Progress Note: 4/4/2018    Assessment/Plan:   Acute peritonitis (e coli) with sepsis  - also with recent candida peritonitis  - CT abd 3/11 Large amount of free intraperitoneal fluid appears somewhat loculated  - Peritoneal fluid heavy E coli on 3/11 and 3/18  - Antibiotics per ID  - continue Cefepime (previously on Vanc/ Zosyn/ voriconazole)  - s/p paracentesis  - Repeat CT on 3/17 shows improvement on loculated fluid  - Repeat CT abd 3/23 The crescentic peritoneal collection extending from right to left in the anterior abdomen is again noted. It is relatively unchanged in size and extent compared to the previous study. The drainage end of the pigtail catheter remains in the right side of the collection. Trace ascites is noted  - US 3/25 no drain able fluid lt abdomen  - The patient might need more drainage of abdominal fluid, but given loculations it may need to be surgically addressed. gen surg folllowing  - repeat CT abd 3/30 Stable peritoneal fluid collection containing a percutaneous drain  - Continue IV antibiotics till 4/6/2018, IR was to reassess drain on 4/3 but this was cancelled  - will consult palliative care to help with pain management.  She is getting PRN IV Dilaudid on the schedule.       HTN  -continue current meds     Thrombocytopenia   -resolved      Bilateral LE edema  -likely third-spacing  -Dopplers negative for DVT      SLE  -continue home meds  -D/C'd Imuran, allopurinol by nephro with acute infection and thrombocytopenia.       History of PE  - on Eliquis since 2012  - per documentation from prior attending: \"appreciate discussion with PMD 3/13. The patient was seen by hematology in  but seems was lost to follow up. I think at that time the plan was to stop anticoagulation if her SLE is stable. Spoke to Dr Jeanette Snow, he feels that the patient can come off Eliquis. His records do not suggest any recurrence of DVT/PE. Spoke to Dr Diamante Patel, he has not seen the patient since >1 yr. If antiphospholipid antibodies negative then the patient can come off Eliquis\"  - 10/17 V/Q high probability for PE  - Per oncology the patient should be continued to Eliquis indefinitely      Hypophosphatemia:  -replete prn     ESRD  -on HD TTS      Anemia  -of chronic disease  -Transfused 2 units PRBC with HD 3/15     Hepatic steatosis    Severe protein calorie malnutrition  -Consult nutrition      Full code       Subjective:   Feels OK, abd pain unchanged, Dilaudid 0.5 mg IV helps but the effect wears off before next dose is available. No n/v/d, no dyspnea. Tolerating diet. Review of Systems:   As above    Objective:     Visit Vitals    BP (!) 141/100    Pulse (!) 102    Temp 98.6 °F (37 °C)    Resp 16    Ht 5' 5\" (1.651 m)    Wt 72.2 kg (159 lb 2.8 oz)    SpO2 98%    BMI 26.49 kg/m2      O2 Device: Room air    Temp (24hrs), Av.4 °F (36.9 °C), Min:98.3 °F (36.8 °C), Max:98.6 °F (37 °C)            1901 -  0700  In: -   Out: 250 [Drains:250]    EXAM:  General: Chronically-ill appearing, NAD    HEENT: Anicteric sclerae, pale conjunctiva, MMM  Neck:   Supple, trachea midline  Lungs:  CTA bilaterally. No Wheezing/Rhonchi/Rales, normal work of breathing. Heart:  Regular rhythm,  No murmur (), No Rubs, No Gallops  Abdomen: Distended. Mild diffuse tenderness.  Paracentesis drain in place draining brown-red opaque fluid, bs+  Extremities: Warm, +++peripheral edema  Neurologic:  CN 2-12 gi, normal speech, moves all extremities   Psych:   Not anxious nor agitated        Data Review:     Recent Results (from the past 24 hour(s)) GLUCOSE, POC    Collection Time: 04/03/18 11:33 AM   Result Value Ref Range    Glucose (POC) 85 65 - 100 mg/dL    Performed by [id number], POC    Collection Time: 04/03/18  4:47 PM   Result Value Ref Range    Glucose (POC) 73 65 - 100 mg/dL    Performed by Tina Johnson    GLUCOSE, POC    Collection Time: 04/03/18  8:46 PM   Result Value Ref Range    Glucose (POC) 87 65 - 100 mg/dL    Performed by Rachel Skinner    GLUCOSE, POC    Collection Time: 04/04/18  6:38 AM   Result Value Ref Range    Glucose (POC) 99 65 - 100 mg/dL    Performed by Martha Sena    CBC W/O DIFF    Collection Time: 04/04/18  7:40 AM   Result Value Ref Range    WBC 4.4 3.6 - 11.0 K/uL    RBC 3.15 (L) 3.80 - 5.20 M/uL    HGB 8.4 (L) 11.5 - 16.0 g/dL    HCT 27.6 (L) 35.0 - 47.0 %    MCV 87.6 80.0 - 99.0 FL    MCH 26.7 26.0 - 34.0 PG    MCHC 30.4 30.0 - 36.5 g/dL    RDW 22.5 (H) 11.5 - 14.5 %    PLATELET 651 534 - 850 K/uL    MPV 10.7 8.9 - 12.9 FL    NRBC 0.0 0  WBC    ABSOLUTE NRBC 0.00 0.00 - 0.01 K/uL   MAGNESIUM    Collection Time: 04/04/18  7:40 AM   Result Value Ref Range    Magnesium 1.9 1.6 - 2.4 mg/dL   PHOSPHORUS    Collection Time: 04/04/18  7:40 AM   Result Value Ref Range    Phosphorus 1.7 (L) 2.6 - 4.7 MG/DL   METABOLIC PANEL, COMPREHENSIVE    Collection Time: 04/04/18  7:40 AM   Result Value Ref Range    Sodium 139 136 - 145 mmol/L    Potassium 4.2 3.5 - 5.1 mmol/L    Chloride 105 97 - 108 mmol/L    CO2 27 21 - 32 mmol/L    Anion gap 7 5 - 15 mmol/L    Glucose 74 65 - 100 mg/dL    BUN 9 6 - 20 MG/DL    Creatinine 3.55 (H) 0.55 - 1.02 MG/DL    BUN/Creatinine ratio 3 (L) 12 - 20      GFR est AA 18 (L) >60 ml/min/1.73m2    GFR est non-AA 15 (L) >60 ml/min/1.73m2    Calcium 8.1 (L) 8.5 - 10.1 MG/DL    Bilirubin, total 0.2 0.2 - 1.0 MG/DL    ALT (SGPT) 7 (L) 12 - 78 U/L    AST (SGOT) 14 (L) 15 - 37 U/L    Alk.  phosphatase 97 45 - 117 U/L    Protein, total 5.4 (L) 6.4 - 8.2 g/dL    Albumin 1.0 (L) 3.5 - 5.0 g/dL    Globulin 4.4 (H) 2.0 - 4.0 g/dL    A-G Ratio 0.2 (L) 1.1 - 2.2         Principal Problem:    Peritonitis ([address] (3/11/2018)        Medications reviewed  Current Facility-Administered Medications   Medication Dose Route Frequency    HYDROmorphone (PF) (DILAUDID) injection 0.5 mg  0.5 mg IntraVENous ONCE    insulin lispro (HUMALOG) injection   SubCUTAneous AC&HS    senna (SENOKOT) tablet 17.2 mg  2 Tab Oral QHS    balsam peru-castor oil (VENELEX)  mg/gram ointment   Topical Q8H    amLODIPine (NORVASC) tablet 5 mg  5 mg Oral DAILY    carvedilol (COREG) tablet 12.5 mg  12.5 mg Oral BID WITH MEALS    dextrose 5% and 0.9% NaCl infusion  25 mL/hr IntraVENous CONTINUOUS    hydrALAZINE (APRESOLINE) 20 mg/mL injection 5 mg  5 mg IntraVENous Q6H PRN    cefepime (MAXIPIME) 1 g in 0.9% sodium chloride (MBP/ADV) 50 mL ADV  1 g IntraVENous Q24H    apixaban (ELIQUIS) tablet 5 mg  5 mg Oral BID    0.9% sodium chloride infusion 250 mL  250 mL IntraVENous PRN    HYDROmorphone (DILAUDID) injection 0.5 mg  0.5 mg IntraVENous Q3H PRN    glucose chewable tablet 16 g  4 Tab Oral PRN    dextrose (D50W) injection syrg 12.5-25 g  12.5-25 g IntraVENous PRN    glucagon (GLUCAGEN) injection 1 mg  1 mg IntraMUSCular PRN    epoetin pia (EPOGEN;PROCRIT) 14,000 Units  14,000 Units SubCUTAneous DIALYSIS TUE, THU & SAT    predniSONE (DELTASONE) tablet 5 mg  5 mg Oral DAILY    pantoprazole (PROTONIX) tablet 40 mg  40 mg Oral ACB    cyanocobalamin (VITAMIN B12) tablet 2,500 mcg  2,500 mcg Oral DAILY    hydroxychloroquine (PLAQUENIL) tablet 200 mg  200 mg Oral BID    albuterol (PROVENTIL VENTOLIN) nebulizer solution 2.5 mg  2.5 mg Nebulization Q4H PRN    gemfibrozil (LOPID) tablet 300 mg  300 mg Oral DAILY    oxyCODONE IR (ROXICODONE) tablet 2.5-5 mg  2.5-5 mg Oral Q6H PRN    amitriptyline (ELAVIL) tablet 25 mg  25 mg Oral QHS    sodium chloride (NS) flush 5-10 mL  5-10 mL IntraVENous Q8H    sodium chloride (NS) flush 5-10 mL  5-10 mL IntraVENous PRN    ondansetron (ZOFRAN) injection 4 mg  4 mg IntraVENous Q4H PRN    arformoterol (BROVANA) neb solution 15 mcg  15 mcg Nebulization BID RT    And    budesonide (PULMICORT) 500 mcg/2 ml nebulizer suspension  500 mcg Nebulization BID RT       Darek Galvez MD

## 2018-04-05 ENCOUNTER — APPOINTMENT (OUTPATIENT)
Dept: INTERVENTIONAL RADIOLOGY/VASCULAR | Age: 33
DRG: 853 | End: 2018-04-05
Attending: SURGERY
Payer: MEDICARE

## 2018-04-05 ENCOUNTER — DOCUMENTATION ONLY (OUTPATIENT)
Dept: FAMILY MEDICINE CLINIC | Age: 33
End: 2018-04-05

## 2018-04-05 LAB
ANION GAP SERPL CALC-SCNC: 5 MMOL/L (ref 5–15)
BUN SERPL-MCNC: 13 MG/DL (ref 6–20)
BUN/CREAT SERPL: 3 (ref 12–20)
CALCIUM SERPL-MCNC: 8.1 MG/DL (ref 8.5–10.1)
CHLORIDE SERPL-SCNC: 106 MMOL/L (ref 97–108)
CO2 SERPL-SCNC: 29 MMOL/L (ref 21–32)
CREAT SERPL-MCNC: 4.38 MG/DL (ref 0.55–1.02)
ERYTHROCYTE [DISTWIDTH] IN BLOOD BY AUTOMATED COUNT: 23.2 % (ref 11.5–14.5)
GLUCOSE BLD STRIP.AUTO-MCNC: 248 MG/DL (ref 65–100)
GLUCOSE BLD STRIP.AUTO-MCNC: 67 MG/DL (ref 65–100)
GLUCOSE BLD STRIP.AUTO-MCNC: 78 MG/DL (ref 65–100)
GLUCOSE BLD STRIP.AUTO-MCNC: 96 MG/DL (ref 65–100)
GLUCOSE BLD STRIP.AUTO-MCNC: 97 MG/DL (ref 65–100)
GLUCOSE BLD STRIP.AUTO-MCNC: 99 MG/DL (ref 65–100)
GLUCOSE SERPL-MCNC: 73 MG/DL (ref 65–100)
HCT VFR BLD AUTO: 27.3 % (ref 35–47)
HGB BLD-MCNC: 8.1 G/DL (ref 11.5–16)
MAGNESIUM SERPL-MCNC: 2 MG/DL (ref 1.6–2.4)
MCH RBC QN AUTO: 26.2 PG (ref 26–34)
MCHC RBC AUTO-ENTMCNC: 29.7 G/DL (ref 30–36.5)
MCV RBC AUTO: 88.3 FL (ref 80–99)
NRBC # BLD: 0.02 K/UL (ref 0–0.01)
NRBC BLD-RTO: 0.5 PER 100 WBC
PHOSPHATE SERPL-MCNC: 1.8 MG/DL (ref 2.6–4.7)
PLATELET # BLD AUTO: 286 K/UL (ref 150–400)
PMV BLD AUTO: 10.3 FL (ref 8.9–12.9)
POTASSIUM SERPL-SCNC: 4.5 MMOL/L (ref 3.5–5.1)
RBC # BLD AUTO: 3.09 M/UL (ref 3.8–5.2)
SERVICE CMNT-IMP: ABNORMAL
SERVICE CMNT-IMP: NORMAL
SODIUM SERPL-SCNC: 140 MMOL/L (ref 136–145)
WBC # BLD AUTO: 4.2 K/UL (ref 3.6–11)

## 2018-04-05 PROCEDURE — 74011000258 HC RX REV CODE- 258: Performed by: INTERNAL MEDICINE

## 2018-04-05 PROCEDURE — 74011250636 HC RX REV CODE- 250/636: Performed by: RADIOLOGY

## 2018-04-05 PROCEDURE — 74011250637 HC RX REV CODE- 250/637: Performed by: HOSPITALIST

## 2018-04-05 PROCEDURE — 90935 HEMODIALYSIS ONE EVALUATION: CPT

## 2018-04-05 PROCEDURE — 77010033678 HC OXYGEN DAILY

## 2018-04-05 PROCEDURE — C1769 GUIDE WIRE: HCPCS

## 2018-04-05 PROCEDURE — 74011000250 HC RX REV CODE- 250: Performed by: HOSPITALIST

## 2018-04-05 PROCEDURE — 76450000000

## 2018-04-05 PROCEDURE — 74011250637 HC RX REV CODE- 250/637: Performed by: INTERNAL MEDICINE

## 2018-04-05 PROCEDURE — C1729 CATH, DRAINAGE: HCPCS

## 2018-04-05 PROCEDURE — 74011250637 HC RX REV CODE- 250/637: Performed by: NURSE PRACTITIONER

## 2018-04-05 PROCEDURE — 75984 XRAY CONTROL CATHETER CHANGE: CPT

## 2018-04-05 PROCEDURE — 83735 ASSAY OF MAGNESIUM: CPT | Performed by: HOSPITALIST

## 2018-04-05 PROCEDURE — 74011250636 HC RX REV CODE- 250/636: Performed by: INTERNAL MEDICINE

## 2018-04-05 PROCEDURE — 65270000029 HC RM PRIVATE

## 2018-04-05 PROCEDURE — 36415 COLL VENOUS BLD VENIPUNCTURE: CPT | Performed by: HOSPITALIST

## 2018-04-05 PROCEDURE — 74011636637 HC RX REV CODE- 636/637: Performed by: HOSPITALIST

## 2018-04-05 PROCEDURE — 0W9G30Z DRAINAGE OF PERITONEAL CAVITY WITH DRAINAGE DEVICE, PERCUTANEOUS APPROACH: ICD-10-PCS | Performed by: RADIOLOGY

## 2018-04-05 PROCEDURE — 74011250636 HC RX REV CODE- 250/636: Performed by: HOSPITALIST

## 2018-04-05 PROCEDURE — 82962 GLUCOSE BLOOD TEST: CPT

## 2018-04-05 PROCEDURE — 77030002996 HC SUT SLK J&J -A

## 2018-04-05 PROCEDURE — 80048 BASIC METABOLIC PNL TOTAL CA: CPT | Performed by: HOSPITALIST

## 2018-04-05 PROCEDURE — 84100 ASSAY OF PHOSPHORUS: CPT | Performed by: HOSPITALIST

## 2018-04-05 PROCEDURE — 85027 COMPLETE CBC AUTOMATED: CPT | Performed by: HOSPITALIST

## 2018-04-05 PROCEDURE — 94640 AIRWAY INHALATION TREATMENT: CPT

## 2018-04-05 PROCEDURE — 77030011230 HC DIL VESL COON COOK -B

## 2018-04-05 PROCEDURE — C1887 CATHETER, GUIDING: HCPCS

## 2018-04-05 PROCEDURE — 74011636320 HC RX REV CODE- 636/320: Performed by: RADIOLOGY

## 2018-04-05 PROCEDURE — 74011000250 HC RX REV CODE- 250

## 2018-04-05 PROCEDURE — 94664 DEMO&/EVAL PT USE INHALER: CPT

## 2018-04-05 RX ORDER — SODIUM CHLORIDE 9 MG/ML
25 INJECTION, SOLUTION INTRAVENOUS CONTINUOUS
Status: DISCONTINUED | OUTPATIENT
Start: 2018-04-05 | End: 2018-04-05

## 2018-04-05 RX ORDER — LIDOCAINE HYDROCHLORIDE 20 MG/ML
INJECTION, SOLUTION INFILTRATION; PERINEURAL
Status: COMPLETED
Start: 2018-04-05 | End: 2018-04-05

## 2018-04-05 RX ORDER — LIDOCAINE HYDROCHLORIDE 20 MG/ML
INJECTION, SOLUTION INFILTRATION; PERINEURAL ONCE
Status: COMPLETED | OUTPATIENT
Start: 2018-04-05 | End: 2018-04-05

## 2018-04-05 RX ORDER — HYDROMORPHONE HYDROCHLORIDE 2 MG/ML
2 INJECTION, SOLUTION INTRAMUSCULAR; INTRAVENOUS; SUBCUTANEOUS
Status: DISCONTINUED | OUTPATIENT
Start: 2018-04-05 | End: 2018-04-05

## 2018-04-05 RX ORDER — MIDAZOLAM HYDROCHLORIDE 1 MG/ML
5 INJECTION, SOLUTION INTRAMUSCULAR; INTRAVENOUS
Status: DISCONTINUED | OUTPATIENT
Start: 2018-04-05 | End: 2018-04-05

## 2018-04-05 RX ADMIN — CASTOR OIL AND BALSAM, PERU: 788; 87 OINTMENT TOPICAL at 12:51

## 2018-04-05 RX ADMIN — HYDROMORPHONE HYDROCHLORIDE 0.5 MG: 2 INJECTION INTRAMUSCULAR; INTRAVENOUS; SUBCUTANEOUS at 10:44

## 2018-04-05 RX ADMIN — INSULIN LISPRO 2 UNITS: 100 INJECTION, SOLUTION INTRAVENOUS; SUBCUTANEOUS at 17:07

## 2018-04-05 RX ADMIN — CASTOR OIL AND BALSAM, PERU: 788; 87 OINTMENT TOPICAL at 04:34

## 2018-04-05 RX ADMIN — BUDESONIDE 500 MCG: 0.5 INHALANT RESPIRATORY (INHALATION) at 21:46

## 2018-04-05 RX ADMIN — HYDROMORPHONE HYDROCHLORIDE 0.5 MG: 2 INJECTION INTRAMUSCULAR; INTRAVENOUS; SUBCUTANEOUS at 07:22

## 2018-04-05 RX ADMIN — Medication 10 ML: at 22:23

## 2018-04-05 RX ADMIN — CARVEDILOL 12.5 MG: 12.5 TABLET, FILM COATED ORAL at 19:32

## 2018-04-05 RX ADMIN — Medication 10 ML: at 04:33

## 2018-04-05 RX ADMIN — IOPAMIDOL 5 ML: 612 INJECTION, SOLUTION INTRAVENOUS at 10:51

## 2018-04-05 RX ADMIN — HYDROMORPHONE HYDROCHLORIDE 0.5 MG: 2 INJECTION INTRAMUSCULAR; INTRAVENOUS; SUBCUTANEOUS at 17:07

## 2018-04-05 RX ADMIN — ARFORMOTEROL TARTRATE 15 MCG: 15 SOLUTION RESPIRATORY (INHALATION) at 21:46

## 2018-04-05 RX ADMIN — MIDAZOLAM HYDROCHLORIDE 1 MG: 1 INJECTION, SOLUTION INTRAMUSCULAR; INTRAVENOUS at 10:40

## 2018-04-05 RX ADMIN — CARVEDILOL 12.5 MG: 12.5 TABLET, FILM COATED ORAL at 12:42

## 2018-04-05 RX ADMIN — HYDROMORPHONE HYDROCHLORIDE 0.5 MG: 2 INJECTION INTRAMUSCULAR; INTRAVENOUS; SUBCUTANEOUS at 01:03

## 2018-04-05 RX ADMIN — ERYTHROPOIETIN 14000 UNITS: 10000 INJECTION, SOLUTION INTRAVENOUS; SUBCUTANEOUS at 19:32

## 2018-04-05 RX ADMIN — HYDROXYCHLOROQUINE SULFATE 200 MG: 200 TABLET, FILM COATED ORAL at 19:33

## 2018-04-05 RX ADMIN — HYDROMORPHONE HYDROCHLORIDE 0.5 MG: 2 INJECTION INTRAMUSCULAR; INTRAVENOUS; SUBCUTANEOUS at 04:33

## 2018-04-05 RX ADMIN — HYDROMORPHONE HYDROCHLORIDE 0.5 MG: 2 INJECTION INTRAMUSCULAR; INTRAVENOUS; SUBCUTANEOUS at 10:21

## 2018-04-05 RX ADMIN — APIXABAN 5 MG: 5 TABLET, FILM COATED ORAL at 12:42

## 2018-04-05 RX ADMIN — PREDNISONE 5 MG: 5 TABLET ORAL at 12:42

## 2018-04-05 RX ADMIN — AMLODIPINE BESYLATE 5 MG: 5 TABLET ORAL at 12:41

## 2018-04-05 RX ADMIN — HYDROXYCHLOROQUINE SULFATE 200 MG: 200 TABLET, FILM COATED ORAL at 12:41

## 2018-04-05 RX ADMIN — AMITRIPTYLINE HYDROCHLORIDE 25 MG: 10 TABLET, FILM COATED ORAL at 22:22

## 2018-04-05 RX ADMIN — SODIUM CHLORIDE 25 ML/HR: 9 INJECTION, SOLUTION INTRAVENOUS at 08:37

## 2018-04-05 RX ADMIN — GEMFIBROZIL 300 MG: 600 TABLET ORAL at 12:41

## 2018-04-05 RX ADMIN — LIDOCAINE HYDROCHLORIDE 10 ML: 20 INJECTION, SOLUTION INFILTRATION; PERINEURAL at 10:48

## 2018-04-05 RX ADMIN — HYDROMORPHONE HYDROCHLORIDE 0.5 MG: 2 INJECTION INTRAMUSCULAR; INTRAVENOUS; SUBCUTANEOUS at 19:53

## 2018-04-05 RX ADMIN — APIXABAN 5 MG: 5 TABLET, FILM COATED ORAL at 19:32

## 2018-04-05 RX ADMIN — HYDROMORPHONE HYDROCHLORIDE 0.5 MG: 2 INJECTION INTRAMUSCULAR; INTRAVENOUS; SUBCUTANEOUS at 12:40

## 2018-04-05 RX ADMIN — Medication 16 G: at 07:23

## 2018-04-05 RX ADMIN — SODIUM CHLORIDE 1 G: 900 INJECTION, SOLUTION INTRAVENOUS at 19:32

## 2018-04-05 RX ADMIN — PANTOPRAZOLE SODIUM 40 MG: 40 TABLET, DELAYED RELEASE ORAL at 07:22

## 2018-04-05 RX ADMIN — SENNOSIDES 17.2 MG: 8.6 TABLET, FILM COATED ORAL at 22:22

## 2018-04-05 RX ADMIN — HYDROMORPHONE HYDROCHLORIDE 0.5 MG: 2 INJECTION INTRAMUSCULAR; INTRAVENOUS; SUBCUTANEOUS at 22:54

## 2018-04-05 RX ADMIN — POLYETHYLENE GLYCOL 3350 17 G: 17 POWDER, FOR SOLUTION ORAL at 12:47

## 2018-04-05 NOTE — PROGRESS NOTES
Palliative Medicine Consult  [place]: [phone] ([phone])    Patient Name: Mamie Aguilar  YOB: 1985    Date of Initial Consult: 4/4/18  Reason for Consult: Care decisions  Requesting Provider: Dr. Harley Jacobson  Primary Care Physician: Bernice Lindsey NP     SUMMARY:   Mamie Aguilar is a 29 y.o. with a past history of SLE, end-stage renal disease (on hemodialysis, TTS), pulmonary embolism, on Eliquis (2012), hyperlipidemia, hypertension, recent Candida peritonitis, chronic bilateral pain, who was admitted on 3/11/2018 from home with a diagnosis of increasing abdominal pain. Current medical issues leading to Palliative Medicine involvement include: complicated hospital course with E'coli peritonitis, loculated ascites s/p drain placement, being followed by general surgery. We are asked to help with pain management. PALLIATIVE DIAGNOSES:   1. Generalized abdominal pain  2. Constipation  3. debility       PLAN:   1. Abdominal pain- mid to lower abdominal pain, sharp, colicky. Pain present only since admission. Could be a combination of her loculated ascites and gas pockets. She reports excruciating 10/10 pain that rises very quickly and goes away with IV dilaudid. 2. Po oxycodone was not offered to patient yesterday. She is willing to try po dose prior to IV. 3. Discussed with DANITZA Crouch, oral dose of Oxycodone should be offered first for pain, IV reserved for pain not controlled with po Oxycodone. 2. Constipation- complains of irregular bowel movements. On senna, start Miralax. Constipation can certainly worsen her pain.       3. Communicated plan of care with: Palliative IDT       GOALS OF CARE / TREATMENT PREFERENCES:     GOALS OF CARE:  Patient/Health Care Proxy Stated Goals: Cure      TREATMENT PREFERENCES:   Code Status: Full Code    Advance Care Planning:  Advance Care Planning 4/4/2018   Patient's Healthcare Decision Maker is: Legal Next of Maria Antonia 70   Primary Decision Maker Name Anton Miranda   Primary Decision Maker Phone Number [phone]   Primary Decision Maker Relationship to Patient Parent   Confirm Advance Directive -   Patient Would Like to Complete Advance Directive -           Other Instructions: Other:    As far as possible, the palliative care team has discussed with patient / health care proxy about goals of care / treatment preferences for patient. HISTORY:         HPI/SUBJECTIVE:    The patient is:   [x] Verbal and participatory  [] Non-participatory due to:     Pleasant young woman, her aunt and cousin are in the room providing her company. She just finished working with PT, did well. Abdominal pain- no prior pain history. Has colicky abdominal pain that is relieved with IV dilaudid. Pain lasts for a few minutes only. Very intense when it comes on like gas pains.     Clinical Pain Assessment (nonverbal scale for severity on nonverbal patients):   Clinical Pain Assessment  Severity: 3  Location: abdomen  Character: sharp, colicky  Duration: weeks  Effect: functional  Factors: none  Frequency: on and off, intermittent          Duration: for how long has pt been experiencing pain (e.g., 2 days, 1 month, years)  Frequency: how often pain is an issue (e.g., several times per day, once every few days, constant)     FUNCTIONAL ASSESSMENT:     Palliative Performance Scale (PPS):  PPS: 80       PSYCHOSOCIAL/SPIRITUAL SCREENING:     Palliative IDT has assessed this patient for cultural preferences / practices and a referral made as appropriate to needs (Cultural Services, Patient Advocacy, Ethics, etc.)    Advance Care Planning:  Advance Care Planning 4/4/2018   Patient's Healthcare Decision Maker is: Legal Next of Maria Antonia 70   Primary Decision Maker Name Anton Miranda   Primary Decision Maker Phone Number [phone]   Primary Decision Maker Relationship to Patient Parent   Confirm Advance Directive -   Patient Would Like to Complete Advance Directive -       Any spiritual / Restorationism concerns:  [] Yes /  [x] No    Caregiver Burnout:  [] Yes /  [x] No /  [] No Caregiver Present      Anticipatory grief assessment:   [x] Normal  / [] Maladaptive       ESAS Anxiety: Anxiety: 0    ESAS Depression: Depression: 0        REVIEW OF SYSTEMS:     Positive and pertinent negative findings in ROS are noted above in HPI. The following systems were [] reviewed / [] unable to be reviewed as noted in HPI  Other findings are noted below. Systems: constitutional, ears/nose/mouth/throat, respiratory, gastrointestinal, genitourinary, musculoskeletal, integumentary, neurologic, psychiatric, endocrine. Positive findings noted below. Modified ESAS Completed by: provider   Fatigue: 0 Drowsiness: 0   Depression: 0 Pain: 3   Anxiety: 0 Nausea: 0   Anorexia: 1 Dyspnea: 0     Constipation: Yes     Stool Occurrence(s): 1        PHYSICAL EXAM:     From RN flowsheet:  Wt Readings from Last 3 Encounters:   04/05/18 157 lb 14.4 oz (71.6 kg)   03/05/18 150 lb (68 kg)   01/29/18 153 lb (69.4 kg)     Blood pressure 116/87, pulse 90, temperature 98.7 °F (37.1 °C), resp. rate 16, height 5' 5\" (1.651 m), weight 157 lb 14.4 oz (71.6 kg), SpO2 96 %, unknown if currently breastfeeding. Pain Scale 1: Visual  Pain Intensity 1: 0  Pain Onset 1: acute  Pain Location 1: Abdomen  Pain Orientation 1: Lower  Pain Description 1: Aching  Pain Intervention(s) 1: Medication (see MAR)  Last bowel movement, if known:     Constitutional: alert, oriented  Eyes: pupils equal, anicteric  ENMT: no nasal discharge, moist mucous membranes  Cardiovascular: regular rhythm, distal pulses intact  Respiratory: breathing not labored, symmetric  Gastrointestinal: soft, mild distention, some tenderness around the drain.   Musculoskeletal: no deformity, no tenderness to palpation  Skin: warm, dry  Neurologic: following commands, moving all extremities  Psychiatric: full affect, no hallucinations  Other:       HISTORY:     Principal Problem:    Peritonitis ([address] (3/11/2018)    Active Problems:    Generalized abdominal pain (2018)      Other constipation (2018)      Other ascites (2018)      Past Medical History:   Diagnosis Date    Anemia     secondary to lupus    Asthma     no inhaler use in past 2 to 3 years    Carditis     Chronic kidney disease     ESRD    Chronic pain     DDD (degenerative disc disease), lumbar     ESRD (end stage renal disease) ([address]     GERD (gastroesophageal reflux disease)     Heart failure ([address]     Hemodialysis patient ([address] 2017    [address]  Tuesday,  Thursday,  and Saturday. 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  Poor historian 2018    With medications    Thromboembolus ([address] 2013    lungs    Transfusion history     Last Transfusion 2017  at [facility]      Past Surgical History:   Procedure Laterality Date    HX  SECTION  11/2006    x1    HX OTHER SURGICAL  9/16/15    INSERTION PD CATH; Removed 2017    HX VASCULAR ACCESS Right 2017    Double-Lumen henry catheter upper chest      Family History   Problem Relation Age of Onset    Diabetes Father     Hypertension Father     Cancer Other      aunt with breast cancer    Diabetes Mother       History reviewed, no pertinent family history.   Social History   Substance Use Topics    Smoking status: Never Smoker    Smokeless tobacco: Never Used    Alcohol use No     Allergies   Allergen Reactions    Compazine [Prochlorperazine Edisylate] Nausea and Vomiting and Palpitations      Current Facility-Administered Medications   Medication Dose Route Frequency    oxyCODONE IR (ROXICODONE) tablet 10 mg  10 mg Oral Q6H PRN    polyethylene glycol (MIRALAX) packet 17 g  17 g Oral DAILY    insulin lispro (HUMALOG) injection   SubCUTAneous AC&HS    senna (SENOKOT) tablet 17.2 mg  2 Tab Oral QHS    balsam peru-castor oil (VENELEX)  mg/gram ointment   Topical Q8H    amLODIPine (NORVASC) tablet 5 mg  5 mg Oral DAILY    carvedilol (COREG) tablet 12.5 mg  12.5 mg Oral BID WITH MEALS    dextrose 5% and 0.9% NaCl infusion  15 mL/hr IntraVENous CONTINUOUS    hydrALAZINE (APRESOLINE) 20 mg/mL injection 5 mg  5 mg IntraVENous Q6H PRN    cefepime (MAXIPIME) 1 g in 0.9% sodium chloride (MBP/ADV) 50 mL ADV  1 g IntraVENous Q24H    apixaban (ELIQUIS) tablet 5 mg  5 mg Oral BID    0.9% sodium chloride infusion 250 mL  250 mL IntraVENous PRN    HYDROmorphone (DILAUDID) injection 0.5 mg  0.5 mg IntraVENous Q3H PRN    glucose chewable tablet 16 g  4 Tab Oral PRN    dextrose (D50W) injection syrg 12.5-25 g  12.5-25 g IntraVENous PRN    glucagon (GLUCAGEN) injection 1 mg  1 mg IntraMUSCular PRN    epoetin pia (EPOGEN;PROCRIT) 14,000 Units  14,000 Units SubCUTAneous DIALYSIS TUE, THU & SAT    predniSONE (DELTASONE) tablet 5 mg  5 mg Oral DAILY    pantoprazole (PROTONIX) tablet 40 mg  40 mg Oral ACB    cyanocobalamin (VITAMIN B12) tablet 2,500 mcg  2,500 mcg Oral DAILY    hydroxychloroquine (PLAQUENIL) tablet 200 mg  200 mg Oral BID    albuterol (PROVENTIL VENTOLIN) nebulizer solution 2.5 mg  2.5 mg Nebulization Q4H PRN    gemfibrozil (LOPID) tablet 300 mg  300 mg Oral DAILY    amitriptyline (ELAVIL) tablet 25 mg  25 mg Oral QHS    sodium chloride (NS) flush 5-10 mL  5-10 mL IntraVENous Q8H    sodium chloride (NS) flush 5-10 mL  5-10 mL IntraVENous PRN    ondansetron (ZOFRAN) injection 4 mg  4 mg IntraVENous Q4H PRN    arformoterol (BROVANA) neb solution 15 mcg  15 mcg Nebulization BID RT    And    budesonide (PULMICORT) 500 mcg/2 ml nebulizer suspension  500 mcg Nebulization BID RT          LAB AND IMAGING FINDINGS:     Lab Results   Component Value Date/Time    WBC 4.2 04/05/2018 04:09 AM    HGB 8.1 (L) 04/05/2018 04:09 AM    PLATELET 509 77/71/2623 04:09 AM     Lab Results   Component Value Date/Time    Sodium 140 04/05/2018 04:09 AM    Potassium 4.5 04/05/2018 04:09 AM    Chloride 106 04/05/2018 04:09 AM    CO2 29 04/05/2018 04:09 AM    BUN 13 04/05/2018 04:09 AM    Creatinine 4.38 (H) 04/05/2018 04:09 AM    Calcium 8.1 (L) 04/05/2018 04:09 AM    Magnesium 2.0 04/05/2018 04:09 AM    Phosphorus 1.8 (L) 04/05/2018 04:09 AM      Lab Results   Component Value Date/Time    AST (SGOT) 14 (L) 04/04/2018 07:40 AM    Alk. phosphatase 97 04/04/2018 07:40 AM    Protein, total 5.4 (L) 04/04/2018 07:40 AM    Albumin 1.0 (L) 04/04/2018 07:40 AM    Globulin 4.4 (H) 04/04/2018 07:40 AM     Lab Results   Component Value Date/Time    INR 1.3 (H) 03/25/2018 04:34 AM    Prothrombin time 13.5 (H) 03/25/2018 04:34 AM    aPTT 31.3 03/25/2018 04:34 AM      Lab Results   Component Value Date/Time    Iron 22 (L) 03/15/2018 10:32 AM    TIBC 52 (L) 03/15/2018 10:32 AM    Iron % saturation 42 03/15/2018 10:32 AM    Ferritin 548 (H) 03/15/2018 10:32 AM      No results found for: PH, PCO2, PO2  No components found for: [place]   Lab Results   Component Value Date/Time    CK 20 (L) 04/25/2013 05:20 PM    CK - MB <0.5 (L) 04/25/2013 05:20 PM                Total time: 70m  Counseling / coordination time, spent as noted above:  60m  > 50% counseling / coordination?: y    Prolonged service was provided for  []30 min   []75 min in face to face time in the presence of the patient, spent as noted above. Time Start:   Time End:   Note: this can only be billed with 04164 (initial) or 03244 (follow up). If multiple start / stop times, list each separately.

## 2018-04-05 NOTE — PROGRESS NOTES
[facility]  [phone]    Vitals   Pre   Post   Assessment   Pre   Post     Temp  98.6  97.6 LOC  A/O x 3 A/O x 3    HR   92 92 Lungs   Clear  Clear     B/P   102/74 130/92 Cardiac   Regular  Regular    Resp   16 16 Skin   Warm, Dry  Warm, Dry    Pain level  6- Pain med given  0 Edema    +3 Edema BLE   +3 RUE    +3 Edema BLE  +3 RUE     Orders:    Duration:   Start:   1658 End:   Procedure End Time: 1759 Total:   8532   Dialyzer:   Dialyzer/Set Up Inspection: Revaclear (04/05/18 1545)   K Bath:   Dialysate K (mEq/L): 3 (04/05/18 1545)   Ca Bath:   Dialysate CA (mEq/L): 2.5 (04/05/18 1545)   Na/Bicarb:   Dialysate NA (mEq/L): 140 (04/05/18 1545)   Target Fluid Removal:   Goal/Amount of Fluid to Remove (mL): 1500 mL (04/05/18 1545)   Access     Type & Location:   RT AVG, +b/+t, prepped per protocol, cannulated with 15G needles x 2. Post-removed needles x 2, achieved hemostasis. Labs     Obtained/Reviewed   Critical Results Called   Date when labs were drawn-  Hgb-    HGB   Date Value Ref Range Status   04/05/2018 8.1 (L) 11.5 - 16.0 g/dL Final     K-    Potassium   Date Value Ref Range Status   04/05/2018 4.5 3.5 - 5.1 mmol/L Final     Ca-   Calcium   Date Value Ref Range Status   04/05/2018 8.1 (L) 8.5 - 10.1 MG/DL Final     Bun-   BUN   Date Value Ref Range Status   04/05/2018 13 6 - 20 MG/DL Final     Creat-   Creatinine   Date Value Ref Range Status   04/05/2018 4.38 (H) 0.55 - 1.02 MG/DL Final     Comment:     INVESTIGATED PER DELTA CHECK PROTOCOL        Medications/ Blood Products Given     Name   Dose   Route and Time                     Blood Volume Processed (BVP):    66.0L Net Fluid   Removed:  2000ml   Comments   Time Out Done:  4007  Primary Nurse Rpt Pre: JOHN Lawton RN   Primary Nurse Rpt Post: JOHN Mcconnell RN  Pt Education: Procedural, Nutrition   Care Plan: Continue HD   Tx Summary: 3hrs completed. All possible blood returned to pt. Post- resting in bed.    Pt's previous clinic-  Consent signed - Informed Consent Verified: Yes (03/31/18 5724)  [facility] Consent - Yes  Hepatitis Status- Negative 3/31/18  Machine #- Machine Number: [id number] (04/05/18 5105)  Telemetry status-N/A   Pre-dialysis wt. - Pre-Dialysis Weight: 71.6 kg (157 lb 13.6 oz) (04/05/18 0915)

## 2018-04-05 NOTE — PROGRESS NOTES
Problem: Falls - Risk of  Goal: *Absence of Falls  Document Alex Fall Risk and appropriate interventions in the flowsheet.    Outcome: Progressing Towards Goal  Fall Risk Interventions:  Mobility Interventions: Patient to call before getting OOB    Mentation Interventions: Adequate sleep, hydration, pain control    Medication Interventions: Patient to call before getting OOB    Elimination Interventions: Patient to call for help with toileting needs

## 2018-04-05 NOTE — PROGRESS NOTES
General Surgery at [facility] was repositioned and upsized. Pt reports pain at insertion site. Patient Vitals for the past 12 hrs:   Temp Pulse Resp BP SpO2   18 1830 - 97 16 (!) 126/93 -   18 1800 - 96 16 119/87 -   18 1730 - 96 16 121/89 -   18 1700 - 95 16 113/85 -   18 1630 - 98 16 110/78 -   18 1600 - 95 16 108/77 -   18 1550 - 93 16 107/78 -   18 1545 98.6 °F (37 °C) 92 16 102/74 -   18 1447 98.7 °F (37.1 °C) 90 16 116/87 96 %   18 1300 98.3 °F (36.8 °C) 99 16 (!) 141/100 100 %   18 1240 97.8 °F (36.6 °C) - 18 - 97 %   18 1230 98.6 °F (37 °C) 100 16 (!) 145/96 100 %   18 1130 - 95 18 (!) 147/100 97 %   18 1113 - 96 16 (!) 144/106 99 %   18 1100 - 95 15 (!) 143/115 100 %   18 1050 - 97 16 (!) 146/115 100 %   18 1045 - 99 18 (!) 153/116 100 %   18 1040 - (!) 103 16 (!) 155/122 100 %   18 0805 98.4 °F (36.9 °C) 99 18 (!) 141/96 100 %     Temp (24hrs), Av.4 °F (36.9 °C), Min:97.8 °F (36.6 °C), Max:98.7 °F (37.1 °C)        Gen NAD  Abd Drainage serosanguinous     Assessment:   Hospital Problems as of 2018  Date Reviewed: 3/11/2018          Codes Class Noted - Resolved POA    Generalized abdominal pain ICD-10-CM: R10.84  ICD-9-CM: 789.07  2018 - Present Unknown        Other constipation ICD-10-CM: K59.09  ICD-9-CM: 564.09  2018 - Present Unknown        Other ascites ICD-10-CM: R18.8  ICD-9-CM: 789.59  2018 - Present Unknown        * (Principal)Peritonitis ([facility] [address] ICD-10-CM: K65.9  ICD-9-CM: 567.9  3/11/2018 - Present Yes              * No surgery found *       Drainage is not purulent and there is a nominal amount.   Rec/Plan:  When drain output is low, less than amount used for flush, would repeat CT to reassess fluid collection    Signed By: Ana Hamilton MD     2018

## 2018-04-05 NOTE — PROGRESS NOTES
Patient name: Garrison Urena  MRN: [id number]    Nephrology Progress note:    Assessment:    ESRD: on HD [facility]     AVG - poor function    E. Coli Bacteremia: source intra-abd source/ E. Coli peritonitis. IV zosyn. Repeat Bld Cx 3/11-> Neg. Recent hx of Candida peritonitis. Needs further IR intervention to appropriately drain abd    HTN:    Hx of PE: on Eliquis    Lupus: Anemia 2 to ESRD/Lupus: Hgb remains below goal - on EPO    SHPT: Hypophosphatemia-> give Neutra-Phos    Protein malnutrition    Hypoglycemia: wean D5NS fluids as tolerated    Edema: 3rd spacing-> increase UF on HD    Plan:    S/p only 30 minutes of HD today b/c poorly functioning AVG  No further HD needed at this time; will check on her tomorrow   Will ask Dr. Ana Rosa De Guzman to evaluate AVG on Monday    Subjective:  Denies complaint at this time. We discussed the plan.    ROS:  No n/v  +Abd pain         Exam:  Visit Vitals    /87 (BP 1 Location: Left arm, BP Patient Position: Head of bed elevated (Comment degrees))    Pulse 90    Temp 98.7 °F (37.1 °C)    Resp 16    Ht 5' 5\" (1.651 m)    Wt 71.6 kg (157 lb 14.4 oz)    SpO2 96%    BMI 26.28 kg/m2     Wt Readings from Last 3 Encounters:   04/05/18 71.6 kg (157 lb 14.4 oz)   03/05/18 68 kg (150 lb)   01/29/18 69.4 kg (153 lb)       Intake/Output Summary (Last 24 hours) at 04/05/18 1501  Last data filed at 04/05/18 0609   Gross per 24 hour   Intake                0 ml   Output               50 ml   Net              -50 ml NAD  CTAB/l  RUE/RLE edema  Abd drain, mild TTP      Labs/Data:    Lab Results   Component Value Date/Time    WBC 4.2 04/05/2018 04:09 AM    Hemoglobin (POC) 7.8 (L) 01/19/2018 12:30 PM    HGB 8.1 (L) 04/05/2018 04:09 AM    Hematocrit (POC) 23 (L) 01/19/2018 12:30 PM    HCT 27.3 (L) 04/05/2018 04:09 AM    PLATELET 684 90/28/6468 04:09 AM    MCV 88.3 04/05/2018 04:09 AM       Lab Results   Component Value Date/Time    Sodium 140 04/05/2018 04:09 AM    Potassium 4.5 04/05/2018 04:09 AM    Chloride 106 04/05/2018 04:09 AM    CO2 29 04/05/2018 04:09 AM    Anion gap 5 04/05/2018 04:09 AM    Glucose 73 04/05/2018 04:09 AM    BUN 13 04/05/2018 04:09 AM    Creatinine 4.38 (H) 04/05/2018 04:09 AM    BUN/Creatinine ratio 3 (L) 04/05/2018 04:09 AM    GFR est AA 14 (L) 04/05/2018 04:09 AM    GFR est non-AA 12 (L) 04/05/2018 04:09 AM    Calcium 8.1 (L) 04/05/2018 04:09 AM        Discussed with patient and RN

## 2018-04-05 NOTE — WOUND CARE
Wound Care Note:     Follow-up visit for skin check    Chart shows:  Admitted for peritonitis with a history of anemia secondary to lupus, asthma, carditis, chronic kidney disease- ESRD, chronic pain, degenerative disc disease, GERD, heart failure, hemodialysis patient, hypercholesterolemia, HTN, intractable nausea and vomiting, long term use of anticoagulants, lupus, malignant hypertension with chronic kidney disease stage V, and thromboembolus. WBC = 4.2 on 4/5/18  Admitted from home    Assessment:   Patient is A&O x 4, communicative, continent with no assistance needed in repositioning. Bed: [facility]  Diet: Regular with nutritional supplements  Patient reports mild discomfort. Bilateral heels, buttocks, and sacral skin intact and without erythema. 1. Sacral wound still epitheliazed, pink color is resolving and some natural pigmentation has been replaced. Patient repositioned supine. Heels offloaded on pillow. Recommendations:    Continue use of Venelex     Sacrum- Every 8 hours apply Venelex ointment.      Moisturize dry skin with Aloe [place]. Skin Care & Pressure Prevention:  Minimize layers of linen/pads under patient to optimize support surface. Turn/reposition approximately every 2 hours and offload heels.   Promote continence     Discussed above plan with patient & Marval Saint, RN    Transition of Care: Plan to follow as needed while admitted to hospital.    JOSH Maradiaga, RN, [facility], [facility].  office [phone]  pager [phone] or call  to page

## 2018-04-05 NOTE — PROGRESS NOTES
Physical Therapy Note:    PT attempted to see patient today. Patient off floor most of day in angio undergoing upsizing of paracentesis drain, just returned to the floor within the last hour or so. Patient with RN upon arrival, awake and alert but stating that she was exhausted and sore. Declined participation with PT today but requesting to be seen tomorrow. Will f/u then for progression of POC as appropriate.     Panchito Díaz MS, PT

## 2018-04-05 NOTE — PROGRESS NOTES
Chart reviewed for High Risk team meeting. NN suggested that patient may benefit for PT while still in-patient, and patient still has no Advance care directive on chart. NN suggested conversation while patient is in-patient. Follow-up has been scheduled for PCP on 4/6/18 if pending discharged. NN will continue to observe for discharge assessment.

## 2018-04-05 NOTE — PROGRESS NOTES
Hospitalist Progress Note            This is a 27-year-old female with multiple medical problems including SLE, end-stage renal disease (on hemodialysis, TTS), pulmonary embolism, on Eliquis (2012), hyperlipidemia, hypertension, recent Candida peritonitis, chronic bilateral pain.  She comes over here because of abdominal pain since last 4 or 5 days. Daily Progress Note: 4/5/2018    Assessment/Plan:   Acute peritonitis (e coli) with sepsis  - also with recent candida peritonitis  - CT abd 3/11 Large amount of free intraperitoneal fluid appears somewhat loculated  - Peritoneal fluid heavy E coli on 3/11 and 3/18  - Antibiotics per ID  - continue Cefepime (previously on Vanc/ Zosyn/ voriconazole)  - s/p paracentesis  - Repeat CT on 3/17 shows improvement on loculated fluid  - Repeat CT abd 3/23 The crescentic peritoneal collection extending from right to left in the anterior abdomen is again noted. It is relatively unchanged in size and extent compared to the previous study. The drainage end of the pigtail catheter remains in the right side of the collection. Trace ascites is noted  - US 3/25 no drain able fluid lt abdomen  - The patient might need more drainage of abdominal fluid, but given loculations it may need to be surgically addressed. gen surg folllowing  - repeat CT abd 3/30 Stable peritoneal fluid collection containing a percutaneous drain  - Continue IV antibiotics till 4/6/2018 per ID  -drain exchanged and upsized by IR on 4/5  - palliative care consulted for pain management, recs appreciated       HTN  -continue current meds     Thrombocytopenia   -resolved      Bilateral LE edema  -likely third-spacing  -Dopplers negative for DVT      SLE  -continue home meds  -D/C'd Imuran, allopurinol by nephro with acute infection and thrombocytopenia.       History of PE  - on Eliquis since 2012  - per documentation from prior attending: \"appreciate discussion with PMD 3/13.  The patient was seen by hematology in  but seems was lost to follow up. I think at that time the plan was to stop anticoagulation if her SLE is stable. Spoke to Dr Elizabeth Byrnes, he feels that the patient can come off Eliquis. His records do not suggest any recurrence of DVT/PE. Spoke to Dr Ilan Fernandez, he has not seen the patient since >1 yr. If antiphospholipid antibodies negative then the patient can come off Eliquis\"  - 10/17 V/Q high probability for PE  - Per oncology the patient should be continued to Eliquis indefinitely      Hypophosphatemia:  -replete prn     ESRD  -on HD TTS      Anemia  -of chronic disease  -Transfused 2 units PRBC with HD 3/15     Hepatic steatosis    Severe protein calorie malnutrition  -Consult nutrition      Full code       Subjective:   Feels OK, abd pain slightly better, had drain upsize today. No n/v/d, no dyspnea. Tolerating diet. Review of Systems:   As above    Objective:     Visit Vitals    BP (!) 141/100    Pulse 99    Temp 98.3 °F (36.8 °C)    Resp 16    Ht 5' 5\" (1.651 m)    Wt 71.6 kg (157 lb 14.4 oz)    SpO2 100%    BMI 26.28 kg/m2    O2 Flow Rate (L/min): 2 l/min O2 Device: Room air    Temp (24hrs), Av.4 °F (36.9 °C), Min:97.8 °F (36.6 °C), Max:98.7 °F (37.1 °C)            1901 -  0700  In: -   Out: 400 [Drains:400]    EXAM:  General: Chronically-ill appearing, NAD    HEENT: Anicteric sclerae, pale conjunctiva, MMM  Neck:   Supple, trachea midline  Lungs:  CTA bilaterally. No Wheezing/Rhonchi/Rales, normal work of breathing. Heart:  Regular rhythm,  No murmur (), No Rubs, No Gallops  Abdomen: Distended. Mild diffuse tenderness.  Paracentesis drain in place draining brown-red opaque fluid, bs+  Extremities: Warm, +++peripheral edema  Neurologic:  CN 2-12 gi, normal speech, moves all extremities   Psych:   Not anxious nor agitated        Data Review:     Recent Results (from the past 24 hour(s))   GLUCOSE, POC    Collection Time: 18  5:00 PM   Result Value Ref Range Glucose (POC) 110 (H) 65 - 100 mg/dL    Performed by [employer]    GLUCOSE, POC    Collection Time: 04/04/18  9:34 PM   Result Value Ref Range    Glucose (POC) 102 (H) 65 - 100 mg/dL    Performed by [employer]    CBC W/O DIFF    Collection Time: 04/05/18  4:09 AM   Result Value Ref Range    WBC 4.2 3.6 - 11.0 K/uL    RBC 3.09 (L) 3.80 - 5.20 M/uL    HGB 8.1 (L) 11.5 - 16.0 g/dL    HCT 27.3 (L) 35.0 - 47.0 %    MCV 88.3 80.0 - 99.0 FL    MCH 26.2 26.0 - 34.0 PG    MCHC 29.7 (L) 30.0 - 36.5 g/dL    RDW 23.2 (H) 11.5 - 14.5 %    PLATELET 346 200 - 295 K/uL    MPV 10.3 8.9 - 12.9 FL    NRBC 0.5 (H) 0  WBC    ABSOLUTE NRBC 0.02 (H) 0.00 - 4.17 K/uL   METABOLIC PANEL, BASIC    Collection Time: 04/05/18  4:09 AM   Result Value Ref Range    Sodium 140 136 - 145 mmol/L    Potassium 4.5 3.5 - 5.1 mmol/L    Chloride 106 97 - 108 mmol/L    CO2 29 21 - 32 mmol/L    Anion gap 5 5 - 15 mmol/L    Glucose 73 65 - 100 mg/dL    BUN 13 6 - 20 MG/DL    Creatinine 4.38 (H) 0.55 - 1.02 MG/DL    BUN/Creatinine ratio 3 (L) 12 - 20      GFR est AA 14 (L) >60 ml/min/1.73m2    GFR est non-AA 12 (L) >60 ml/min/1.73m2    Calcium 8.1 (L) 8.5 - 10.1 MG/DL   MAGNESIUM    Collection Time: 04/05/18  4:09 AM   Result Value Ref Range    Magnesium 2.0 1.6 - 2.4 mg/dL   PHOSPHORUS    Collection Time: 04/05/18  4:09 AM   Result Value Ref Range    Phosphorus 1.8 (L) 2.6 - 4.7 MG/DL   GLUCOSE, POC    Collection Time: 04/05/18  7:16 AM   Result Value Ref Range    Glucose (POC) 78 65 - 100 mg/dL    Performed by Cecelia Fitzgerald    GLUCOSE, POC    Collection Time: 04/05/18  7:46 AM   Result Value Ref Range    Glucose (POC) 97 65 - 100 mg/dL    Performed by Mitch Mccarty, POC    Collection Time: 04/05/18 12:02 PM   Result Value Ref Range    Glucose (POC) 67 65 - 100 mg/dL    Performed by Monica Castro    GLUCOSE, POC    Collection Time: 04/05/18 12:16 PM   Result Value Ref Range    Glucose (POC) 99 65 - 100 mg/dL    Performed by Marie Dickerson Amrit James        Principal Problem:    Peritonitis ([address] (3/11/2018)    Active Problems:    Generalized abdominal pain (4/4/2018)      Other constipation (4/4/2018)      Other ascites (4/4/2018)        Medications reviewed  Current Facility-Administered Medications   Medication Dose Route Frequency    oxyCODONE IR (ROXICODONE) tablet 10 mg  10 mg Oral Q6H PRN    polyethylene glycol (MIRALAX) packet 17 g  17 g Oral DAILY    insulin lispro (HUMALOG) injection   SubCUTAneous AC&HS    senna (SENOKOT) tablet 17.2 mg  2 Tab Oral QHS    balsam peru-castor oil (VENELEX)  mg/gram ointment   Topical Q8H    amLODIPine (NORVASC) tablet 5 mg  5 mg Oral DAILY    carvedilol (COREG) tablet 12.5 mg  12.5 mg Oral BID WITH MEALS    dextrose 5% and 0.9% NaCl infusion  25 mL/hr IntraVENous CONTINUOUS    hydrALAZINE (APRESOLINE) 20 mg/mL injection 5 mg  5 mg IntraVENous Q6H PRN    cefepime (MAXIPIME) 1 g in 0.9% sodium chloride (MBP/ADV) 50 mL ADV  1 g IntraVENous Q24H    apixaban (ELIQUIS) tablet 5 mg  5 mg Oral BID    0.9% sodium chloride infusion 250 mL  250 mL IntraVENous PRN    HYDROmorphone (DILAUDID) injection 0.5 mg  0.5 mg IntraVENous Q3H PRN    glucose chewable tablet 16 g  4 Tab Oral PRN    dextrose (D50W) injection syrg 12.5-25 g  12.5-25 g IntraVENous PRN    glucagon (GLUCAGEN) injection 1 mg  1 mg IntraMUSCular PRN    epoetin pia (EPOGEN;PROCRIT) 14,000 Units  14,000 Units SubCUTAneous DIALYSIS TUE, THU & SAT    predniSONE (DELTASONE) tablet 5 mg  5 mg Oral DAILY    pantoprazole (PROTONIX) tablet 40 mg  40 mg Oral ACB    cyanocobalamin (VITAMIN B12) tablet 2,500 mcg  2,500 mcg Oral DAILY    hydroxychloroquine (PLAQUENIL) tablet 200 mg  200 mg Oral BID    albuterol (PROVENTIL VENTOLIN) nebulizer solution 2.5 mg  2.5 mg Nebulization Q4H PRN    gemfibrozil (LOPID) tablet 300 mg  300 mg Oral DAILY    amitriptyline (ELAVIL) tablet 25 mg  25 mg Oral QHS    sodium chloride (NS) flush 5-10 mL  5-10 mL IntraVENous Q8H    sodium chloride (NS) flush 5-10 mL  5-10 mL IntraVENous PRN    ondansetron (ZOFRAN) injection 4 mg  4 mg IntraVENous Q4H PRN    arformoterol (BROVANA) neb solution 15 mcg  15 mcg Nebulization BID RT    And    budesonide (PULMICORT) 500 mcg/2 ml nebulizer suspension  500 mcg Nebulization BID RT       [employer] MD

## 2018-04-05 NOTE — PROGRESS NOTES
0805: pt to angio holding for paracentesis drain upsize  1047: procedure start  1055: procedure complete. Pt tolerated well.  1100: TRANSFER - OUT REPORT:    Verbal report given to [employer] on Angella Jimenez  being transferred to  for routine progression of care       Report consisted of patients Situation, Background, Assessment and   Recommendations(SBAR). Information from the following report(s) SBAR, Procedure Summary, Intake/Output and MAR was reviewed with the receiving nurse. Lines:   Peripheral IV 04/02/18 Left; Inner Forearm (Active)   Site Assessment Clean, dry, & intact 4/4/2018  7:59 PM   Phlebitis Assessment 0 4/4/2018  7:59 PM   Infiltration Assessment 0 4/4/2018  7:59 PM   Dressing Status Clean, dry, & intact 4/4/2018  7:59 PM   Dressing Type Transparent 4/4/2018  7:59 PM   Hub Color/Line Status Patent 4/4/2018  7:59 PM   Action Taken Open ports on tubing capped 4/4/2018  7:59 PM   Alcohol Cap Used Yes 4/4/2018  7:59 PM       Peripheral IV 04/05/18 Left Forearm (Active)        Opportunity for questions and clarification was provided.       Patient transported with:   sougou

## 2018-04-05 NOTE — ROUTINE PROCESS
TRANSFER - IN REPORT:    Verbal report received from Ephraim Elmore (name) on [place]  being received from IR (unit) for routine progression of care      Report consisted of patients Situation, Background, Assessment and   Recommendations(SBAR). Information from the following report(s) SBAR, Kardex, Procedure Summary, Intake/Output and MAR was reviewed with the receiving nurse. Opportunity for questions and clarification was provided.

## 2018-04-06 LAB
ANION GAP SERPL CALC-SCNC: 7 MMOL/L (ref 5–15)
BUN SERPL-MCNC: 9 MG/DL (ref 6–20)
BUN/CREAT SERPL: 3 (ref 12–20)
CALCIUM SERPL-MCNC: 7.8 MG/DL (ref 8.5–10.1)
CHLORIDE SERPL-SCNC: 105 MMOL/L (ref 97–108)
CO2 SERPL-SCNC: 30 MMOL/L (ref 21–32)
CREAT SERPL-MCNC: 3.32 MG/DL (ref 0.55–1.02)
GLUCOSE BLD STRIP.AUTO-MCNC: 69 MG/DL (ref 65–100)
GLUCOSE BLD STRIP.AUTO-MCNC: 81 MG/DL (ref 65–100)
GLUCOSE BLD STRIP.AUTO-MCNC: 86 MG/DL (ref 65–100)
GLUCOSE BLD STRIP.AUTO-MCNC: 87 MG/DL (ref 65–100)
GLUCOSE BLD STRIP.AUTO-MCNC: 87 MG/DL (ref 65–100)
GLUCOSE SERPL-MCNC: 80 MG/DL (ref 65–100)
PHOSPHATE SERPL-MCNC: 1.7 MG/DL (ref 2.6–4.7)
POTASSIUM SERPL-SCNC: 4.1 MMOL/L (ref 3.5–5.1)
SERVICE CMNT-IMP: NORMAL
SODIUM SERPL-SCNC: 142 MMOL/L (ref 136–145)

## 2018-04-06 PROCEDURE — 84100 ASSAY OF PHOSPHORUS: CPT | Performed by: HOSPITALIST

## 2018-04-06 PROCEDURE — 80048 BASIC METABOLIC PNL TOTAL CA: CPT | Performed by: HOSPITALIST

## 2018-04-06 PROCEDURE — 65270000029 HC RM PRIVATE

## 2018-04-06 PROCEDURE — 77010033678 HC OXYGEN DAILY

## 2018-04-06 PROCEDURE — 74011250636 HC RX REV CODE- 250/636: Performed by: INTERNAL MEDICINE

## 2018-04-06 PROCEDURE — 94664 DEMO&/EVAL PT USE INHALER: CPT

## 2018-04-06 PROCEDURE — 74011000258 HC RX REV CODE- 258: Performed by: INTERNAL MEDICINE

## 2018-04-06 PROCEDURE — 74011250637 HC RX REV CODE- 250/637: Performed by: INTERNAL MEDICINE

## 2018-04-06 PROCEDURE — 74011250637 HC RX REV CODE- 250/637: Performed by: HOSPITALIST

## 2018-04-06 PROCEDURE — 74011250637 HC RX REV CODE- 250/637: Performed by: NURSE PRACTITIONER

## 2018-04-06 PROCEDURE — 74011250636 HC RX REV CODE- 250/636: Performed by: HOSPITALIST

## 2018-04-06 PROCEDURE — 74011000250 HC RX REV CODE- 250: Performed by: HOSPITALIST

## 2018-04-06 PROCEDURE — 94640 AIRWAY INHALATION TREATMENT: CPT

## 2018-04-06 PROCEDURE — 74011636637 HC RX REV CODE- 636/637: Performed by: HOSPITALIST

## 2018-04-06 PROCEDURE — 82962 GLUCOSE BLOOD TEST: CPT

## 2018-04-06 PROCEDURE — 36415 COLL VENOUS BLD VENIPUNCTURE: CPT | Performed by: HOSPITALIST

## 2018-04-06 RX ORDER — SODIUM,POTASSIUM PHOSPHATES 280-250MG
2 POWDER IN PACKET (EA) ORAL
Status: COMPLETED | OUTPATIENT
Start: 2018-04-06 | End: 2018-04-06

## 2018-04-06 RX ORDER — HYDROMORPHONE HYDROCHLORIDE 2 MG/ML
0.5 INJECTION, SOLUTION INTRAMUSCULAR; INTRAVENOUS; SUBCUTANEOUS
Status: DISCONTINUED | OUTPATIENT
Start: 2018-04-06 | End: 2018-04-12

## 2018-04-06 RX ADMIN — CARVEDILOL 12.5 MG: 12.5 TABLET, FILM COATED ORAL at 18:19

## 2018-04-06 RX ADMIN — CASTOR OIL AND BALSAM, PERU: 788; 87 OINTMENT TOPICAL at 05:54

## 2018-04-06 RX ADMIN — BUDESONIDE 500 MCG: 0.5 INHALANT RESPIRATORY (INHALATION) at 09:01

## 2018-04-06 RX ADMIN — Medication 2500 MCG: at 09:16

## 2018-04-06 RX ADMIN — HYDROMORPHONE HYDROCHLORIDE 0.5 MG: 2 INJECTION INTRAMUSCULAR; INTRAVENOUS; SUBCUTANEOUS at 05:54

## 2018-04-06 RX ADMIN — APIXABAN 5 MG: 5 TABLET, FILM COATED ORAL at 09:17

## 2018-04-06 RX ADMIN — HYDROXYCHLOROQUINE SULFATE 200 MG: 200 TABLET, FILM COATED ORAL at 09:17

## 2018-04-06 RX ADMIN — Medication 10 ML: at 05:55

## 2018-04-06 RX ADMIN — HYDROMORPHONE HYDROCHLORIDE 0.5 MG: 2 INJECTION INTRAMUSCULAR; INTRAVENOUS; SUBCUTANEOUS at 09:17

## 2018-04-06 RX ADMIN — ARFORMOTEROL TARTRATE 15 MCG: 15 SOLUTION RESPIRATORY (INHALATION) at 21:40

## 2018-04-06 RX ADMIN — HYDROMORPHONE HYDROCHLORIDE 0.5 MG: 2 INJECTION INTRAMUSCULAR; INTRAVENOUS; SUBCUTANEOUS at 01:51

## 2018-04-06 RX ADMIN — CARVEDILOL 12.5 MG: 12.5 TABLET, FILM COATED ORAL at 09:17

## 2018-04-06 RX ADMIN — HYDROMORPHONE HYDROCHLORIDE 0.5 MG: 2 INJECTION, SOLUTION INTRAMUSCULAR; INTRAVENOUS; SUBCUTANEOUS at 19:47

## 2018-04-06 RX ADMIN — PREDNISONE 5 MG: 5 TABLET ORAL at 09:16

## 2018-04-06 RX ADMIN — SODIUM CHLORIDE 1 G: 900 INJECTION, SOLUTION INTRAVENOUS at 21:25

## 2018-04-06 RX ADMIN — Medication 8 G: at 21:14

## 2018-04-06 RX ADMIN — CASTOR OIL AND BALSAM, PERU: 788; 87 OINTMENT TOPICAL at 15:58

## 2018-04-06 RX ADMIN — AMITRIPTYLINE HYDROCHLORIDE 25 MG: 10 TABLET, FILM COATED ORAL at 21:12

## 2018-04-06 RX ADMIN — GEMFIBROZIL 300 MG: 600 TABLET ORAL at 09:17

## 2018-04-06 RX ADMIN — SENNOSIDES 17.2 MG: 8.6 TABLET, FILM COATED ORAL at 21:13

## 2018-04-06 RX ADMIN — BUDESONIDE 500 MCG: 0.5 INHALANT RESPIRATORY (INHALATION) at 21:40

## 2018-04-06 RX ADMIN — Medication 10 ML: at 15:59

## 2018-04-06 RX ADMIN — HYDRALAZINE HYDROCHLORIDE 5 MG: 20 INJECTION INTRAMUSCULAR; INTRAVENOUS at 21:15

## 2018-04-06 RX ADMIN — HYDROXYCHLOROQUINE SULFATE 200 MG: 200 TABLET, FILM COATED ORAL at 18:18

## 2018-04-06 RX ADMIN — POLYETHYLENE GLYCOL 3350 17 G: 17 POWDER, FOR SOLUTION ORAL at 09:17

## 2018-04-06 RX ADMIN — HYDROMORPHONE HYDROCHLORIDE 0.5 MG: 2 INJECTION INTRAMUSCULAR; INTRAVENOUS; SUBCUTANEOUS at 11:57

## 2018-04-06 RX ADMIN — POTASSIUM & SODIUM PHOSPHATES POWDER PACK 280-160-250 MG 2 PACKET: 280-160-250 PACK at 09:16

## 2018-04-06 RX ADMIN — APIXABAN 5 MG: 5 TABLET, FILM COATED ORAL at 18:19

## 2018-04-06 RX ADMIN — Medication 10 ML: at 21:13

## 2018-04-06 RX ADMIN — ONDANSETRON 4 MG: 2 INJECTION INTRAMUSCULAR; INTRAVENOUS at 19:47

## 2018-04-06 RX ADMIN — AMLODIPINE BESYLATE 5 MG: 5 TABLET ORAL at 09:16

## 2018-04-06 RX ADMIN — ARFORMOTEROL TARTRATE 15 MCG: 15 SOLUTION RESPIRATORY (INHALATION) at 09:01

## 2018-04-06 RX ADMIN — OXYCODONE HYDROCHLORIDE 10 MG: 5 TABLET ORAL at 18:23

## 2018-04-06 RX ADMIN — PANTOPRAZOLE SODIUM 40 MG: 40 TABLET, DELAYED RELEASE ORAL at 06:00

## 2018-04-06 RX ADMIN — HYDROMORPHONE HYDROCHLORIDE 0.5 MG: 2 INJECTION, SOLUTION INTRAMUSCULAR; INTRAVENOUS; SUBCUTANEOUS at 15:58

## 2018-04-06 NOTE — PROGRESS NOTES
Palliative Medicine Consult  [place]: [phone] ([phone])    Patient Name: Crow Angeles  YOB: 1985    Date of Initial Consult: 4/4/18  Reason for Consult: Care decisions  Requesting Provider: Dr. Alma Fajardo  Primary Care Physician: Zackary Bettencourt NP     SUMMARY:   Crow Angeles is a 29 y.o. with a past history of SLE, end-stage renal disease (on hemodialysis, TTS), pulmonary embolism, on Eliquis (2012), hyperlipidemia, hypertension, recent Candida peritonitis, chronic bilateral pain, who was admitted on 3/11/2018 from home with a diagnosis of increasing abdominal pain. Current medical issues leading to Palliative Medicine involvement include: complicated hospital course with E'coli peritonitis, loculated ascites s/p drain placement, being followed by general surgery. We are asked to help with pain management. PALLIATIVE DIAGNOSES:   1. Generalized abdominal pain  2. Constipation  3. debility       PLAN:   1. Abdominal pain- mid to lower abdominal pain, sharp, colicky. Pain present only since admission. Could be a combination of her loculated ascites and gas pockets. She reports excruciating 10/10 pain that rises very quickly and goes away with IV dilaudid. 2. Patient continues to tell me that po Oxycodone is not being offered to her. She is in a lot of pain that escalates quickly and therefore she is given IV dilaudid. 3. Recommend decreasing IV  dilaudid frequency to every 4 hours and trying po Oxycodone at least that way we can reduce use of IV dilaudid. 4. On average she requires 7 doses of IV dilaudid 0.5 mg.  5. Discussed with DANITZA Bonilla, oral dose of Oxycodone should be offered first for pain, IV reserved for pain not controlled with po Oxycodone. 2. Constipation- complains of irregular bowel movements. On senna, start Miralax. Constipation can certainly worsen her pain.       3. Communicated plan of care with: Palliative IDT       GOALS OF CARE / TREATMENT PREFERENCES:     GOALS OF CARE:  Patient/Health Care Proxy Stated Goals: Cure      TREATMENT PREFERENCES:   Code Status: Full Code    Advance Care Planning:  Advance Care Planning 4/4/2018   Patient's Healthcare Decision Maker is: Legal Next of Kin   Primary Decision Maker Name Guido Kelley   Primary Decision Maker Phone Number [phone]   Primary Decision Maker Relationship to Patient Parent   Confirm Advance Directive -   Patient Would Like to Complete Advance Directive -           Other Instructions: Other:    As far as possible, the palliative care team has discussed with patient / health care proxy about goals of care / treatment preferences for patient. HISTORY:         HPI/SUBJECTIVE:    The patient is:   [x] Verbal and participatory  [] Non-participatory due to:     Pleasant young woman, her aunt and cousin are in the room providing her company. She just finished working with PT, did well. Abdominal pain- no prior pain history. Has colicky abdominal pain that is relieved with IV dilaudid. Pain lasts for a few minutes only. Very intense when it comes on like gas pains.     Clinical Pain Assessment (nonverbal scale for severity on nonverbal patients):   Clinical Pain Assessment  Severity: 3  Location: abdomen  Character: sharp, colicky  Duration: weeks  Effect: functional  Factors: none  Frequency: on and off, intermittent          Duration: for how long has pt been experiencing pain (e.g., 2 days, 1 month, years)  Frequency: how often pain is an issue (e.g., several times per day, once every few days, constant)     FUNCTIONAL ASSESSMENT:     Palliative Performance Scale (PPS):  PPS: 80       PSYCHOSOCIAL/SPIRITUAL SCREENING:     Palliative IDT has assessed this patient for cultural preferences / practices and a referral made as appropriate to needs (Cultural Services, Patient Advocacy, Ethics, etc.)    Advance Care Planning:  Advance Care Planning 4/4/2018   Patient's Healthcare Decision Maker is: Legal Next of Kin   Primary Decision Maker Name Rene Venegas   Primary Decision Maker Phone Number [phone]   Primary Decision Maker Relationship to Patient Parent   Confirm Advance Directive -   Patient Would Like to Complete Advance Directive -       Any spiritual / Mosque concerns:  [] Yes /  [x] No    Caregiver Burnout:  [] Yes /  [x] No /  [] No Caregiver Present      Anticipatory grief assessment:   [x] Normal  / [] Maladaptive       ESAS Anxiety: Anxiety: 0    ESAS Depression: Depression: 0        REVIEW OF SYSTEMS:     Positive and pertinent negative findings in ROS are noted above in HPI. The following systems were [] reviewed / [] unable to be reviewed as noted in HPI  Other findings are noted below. Systems: constitutional, ears/nose/mouth/throat, respiratory, gastrointestinal, genitourinary, musculoskeletal, integumentary, neurologic, psychiatric, endocrine. Positive findings noted below. Modified ESAS Completed by: provider   Fatigue: 0 Drowsiness: 0   Depression: 0 Pain: 3   Anxiety: 0 Nausea: 0   Anorexia: 1 Dyspnea: 0     Constipation: Yes     Stool Occurrence(s): 1        PHYSICAL EXAM:     From RN flowsheet:  Wt Readings from Last 3 Encounters:   04/05/18 157 lb 14.4 oz (71.6 kg)   03/05/18 150 lb (68 kg)   01/29/18 153 lb (69.4 kg)     Blood pressure 131/88, pulse 98, temperature 98.6 °F (37 °C), resp. rate 16, height 5' 5\" (1.651 m), weight 157 lb 14.4 oz (71.6 kg), SpO2 96 %, unknown if currently breastfeeding.     Pain Scale 1: Numeric (0 - 10)  Pain Intensity 1: 5  Pain Onset 1: post op  Pain Location 1: Abdomen  Pain Orientation 1: Lower  Pain Description 1: Aching  Pain Intervention(s) 1: Medication (see MAR)  Last bowel movement, if known:     Constitutional: alert, oriented  Eyes: pupils equal, anicteric  ENMT: no nasal discharge, moist mucous membranes  Cardiovascular: regular rhythm, distal pulses intact  Respiratory: breathing not labored, symmetric  Gastrointestinal: soft, mild distention, some tenderness around the drain. Musculoskeletal: no deformity, no tenderness to palpation  Skin: warm, dry  Neurologic: following commands, moving all extremities  Psychiatric: full affect, no hallucinations  Other:       HISTORY:     Principal Problem:    Peritonitis ([address] (3/11/2018)    Active Problems:    Generalized abdominal pain (2018)      Other constipation (2018)      Other ascites (2018)      Past Medical History:   Diagnosis Date    Anemia     secondary to lupus    Asthma     no inhaler use in past 2 to 3 years    Carditis     Chronic kidney disease     ESRD    Chronic pain     DDD (degenerative disc disease), lumbar     ESRD (end stage renal disease) ([address]     GERD (gastroesophageal reflux disease)     Heart failure ([address]     Hemodialysis patient ([address] 2017    [address]  Tuesday,  Thursday,  and Saturday. 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  Poor historian 2018    With medications    Thromboembolus ([address]     lungs    Transfusion history     Last Transfusion 2017  at [facility]      Past Surgical History:   Procedure Laterality Date    HX  SECTION  11/2006    x1    HX OTHER SURGICAL  9/16/15    INSERTION PD CATH; Removed 2017    HX VASCULAR ACCESS Right 2017    Double-Lumen henry catheter upper chest      Family History   Problem Relation Age of Onset    Diabetes Father     Hypertension Father     Cancer Other      aunt with breast cancer    Diabetes Mother       History reviewed, no pertinent family history.   Social History   Substance Use Topics    Smoking status: Never Smoker    Smokeless tobacco: Never Used    Alcohol use No     Allergies   Allergen Reactions    Compazine [Prochlorperazine Edisylate] Nausea and Vomiting and Palpitations      Current Facility-Administered Medications   Medication Dose Route Frequency    cefepime (MAXIPIME) 1 g in 0.9% sodium chloride (MBP/ADV) 50 mL ADV  1 g IntraVENous Q24H    oxyCODONE IR (ROXICODONE) tablet 10 mg  10 mg Oral Q6H PRN    polyethylene glycol (MIRALAX) packet 17 g  17 g Oral DAILY    insulin lispro (HUMALOG) injection   SubCUTAneous AC&HS    senna (SENOKOT) tablet 17.2 mg  2 Tab Oral QHS    balsam peru-castor oil (VENELEX)  mg/gram ointment   Topical Q8H    amLODIPine (NORVASC) tablet 5 mg  5 mg Oral DAILY    carvedilol (COREG) tablet 12.5 mg  12.5 mg Oral BID WITH MEALS    dextrose 5% and 0.9% NaCl infusion  15 mL/hr IntraVENous CONTINUOUS    hydrALAZINE (APRESOLINE) 20 mg/mL injection 5 mg  5 mg IntraVENous Q6H PRN    apixaban (ELIQUIS) tablet 5 mg  5 mg Oral BID    0.9% sodium chloride infusion 250 mL  250 mL IntraVENous PRN    HYDROmorphone (DILAUDID) injection 0.5 mg  0.5 mg IntraVENous Q3H PRN    glucose chewable tablet 16 g  4 Tab Oral PRN    dextrose (D50W) injection syrg 12.5-25 g  12.5-25 g IntraVENous PRN    glucagon (GLUCAGEN) injection 1 mg  1 mg IntraMUSCular PRN    epoetin pia (EPOGEN;PROCRIT) 14,000 Units  14,000 Units SubCUTAneous DIALYSIS TUE, THU & SAT    predniSONE (DELTASONE) tablet 5 mg  5 mg Oral DAILY    pantoprazole (PROTONIX) tablet 40 mg  40 mg Oral ACB    cyanocobalamin (VITAMIN B12) tablet 2,500 mcg  2,500 mcg Oral DAILY    hydroxychloroquine (PLAQUENIL) tablet 200 mg  200 mg Oral BID    albuterol (PROVENTIL VENTOLIN) nebulizer solution 2.5 mg  2.5 mg Nebulization Q4H PRN    gemfibrozil (LOPID) tablet 300 mg  300 mg Oral DAILY    amitriptyline (ELAVIL) tablet 25 mg  25 mg Oral QHS    sodium chloride (NS) flush 5-10 mL  5-10 mL IntraVENous Q8H    sodium chloride (NS) flush 5-10 mL  5-10 mL IntraVENous PRN    ondansetron (ZOFRAN) injection 4 mg  4 mg IntraVENous Q4H PRN    arformoterol (BROVANA) neb solution 15 mcg  15 mcg Nebulization BID RT    And    budesonide (PULMICORT) 500 mcg/2 ml nebulizer suspension  500 mcg Nebulization BID RT          LAB AND IMAGING FINDINGS:     Lab Results   Component Value Date/Time    WBC 4.2 04/05/2018 04:09 AM    HGB 8.1 (L) 04/05/2018 04:09 AM    PLATELET 634 25/55/9902 04:09 AM     Lab Results   Component Value Date/Time    Sodium 142 04/06/2018 02:46 AM    Potassium 4.1 04/06/2018 02:46 AM    Chloride 105 04/06/2018 02:46 AM    CO2 30 04/06/2018 02:46 AM    BUN 9 04/06/2018 02:46 AM    Creatinine 3.32 (H) 04/06/2018 02:46 AM    Calcium 7.8 (L) 04/06/2018 02:46 AM    Magnesium 2.0 04/05/2018 04:09 AM    Phosphorus 1.7 (L) 04/06/2018 02:46 AM      Lab Results   Component Value Date/Time    AST (SGOT) 14 (L) 04/04/2018 07:40 AM    Alk. phosphatase 97 04/04/2018 07:40 AM    Protein, total 5.4 (L) 04/04/2018 07:40 AM    Albumin 1.0 (L) 04/04/2018 07:40 AM    Globulin 4.4 (H) 04/04/2018 07:40 AM     Lab Results   Component Value Date/Time    INR 1.3 (H) 03/25/2018 04:34 AM    Prothrombin time 13.5 (H) 03/25/2018 04:34 AM    aPTT 31.3 03/25/2018 04:34 AM      Lab Results   Component Value Date/Time    Iron 22 (L) 03/15/2018 10:32 AM    TIBC 52 (L) 03/15/2018 10:32 AM    Iron % saturation 42 03/15/2018 10:32 AM    Ferritin 548 (H) 03/15/2018 10:32 AM      No results found for: PH, PCO2, PO2  No components found for: [place]   Lab Results   Component Value Date/Time    CK 20 (L) 04/25/2013 05:20 PM    CK - MB <0.5 (L) 04/25/2013 05:20 PM                Total time: 70m  Counseling / coordination time, spent as noted above:  60m  > 50% counseling / coordination?: y    Prolonged service was provided for  []30 min   []75 min in face to face time in the presence of the patient, spent as noted above.   Time Start: Time End:   Note: this can only be billed with 03067 (initial) or 28194 (follow up). If multiple start / stop times, list each separately.

## 2018-04-06 NOTE — ACP (ADVANCE CARE PLANNING)
Advance Care Planning 4/4/2018   Patient's Healthcare Decision Maker is: Legal Next of Kin   Primary Decision Maker Name Nazanin Arce   Primary Decision Maker Phone Number [phone]   Primary Decision Maker Relationship to Patient Parent   Confirm Advance Directive -   Patient Would Like to Complete Advance Directive -     Patient wants mom to be her mPOA. Agrees to completing AMD but wants mom present.   She will let RN know when mom is visiting so we can request  to complete AMD.

## 2018-04-06 NOTE — PROGRESS NOTES
Surgery Progress Note    Admit Date: 3/11/2018      Subjective:     Patient complains of pain at drain site. Denies nausea or vomiting. Drain output was 150 cc yesterday. Objective:     Patient Vitals for the past 8 hrs:   BP Temp Pulse Resp SpO2   04/06/18 0906 (!) 162/112 97.8 °F (36.6 °C) 96 16 100 %   04/06/18 0901 - - - - 95 %        04/04 1901 - 04/06 0700  In: -   Out: 4346 [Drains:50]ip  Physical Exam:    General: alert, cooperative, no distress  Cardiac: normal S1 and S2  Lungs: Normal chest wall and respirations. Clear to auscultation. Abdomen: soft, nondistended, tender at drain site. Drain with serous anginous output. CBC: Lab Results   Component Value Date/Time    WBC 4.2 04/05/2018 04:09 AM    RBC 3.09 (L) 04/05/2018 04:09 AM    HGB 8.1 (L) 04/05/2018 04:09 AM    HCT 27.3 (L) 04/05/2018 04:09 AM    PLATELET 328 88/26/2484 04:09 AM     BMP: Lab Results   Component Value Date/Time    Glucose 80 04/06/2018 02:46 AM    Sodium 142 04/06/2018 02:46 AM    Potassium 4.1 04/06/2018 02:46 AM    Chloride 105 04/06/2018 02:46 AM    CO2 30 04/06/2018 02:46 AM    BUN 9 04/06/2018 02:46 AM    Creatinine 3.32 (H) 04/06/2018 02:46 AM    Calcium 7.8 (L) 04/06/2018 02:46 AM     CMP:  Lab Results   Component Value Date/Time    Glucose 80 04/06/2018 02:46 AM    Sodium 142 04/06/2018 02:46 AM    Potassium 4.1 04/06/2018 02:46 AM    Chloride 105 04/06/2018 02:46 AM    CO2 30 04/06/2018 02:46 AM    BUN 9 04/06/2018 02:46 AM    Creatinine 3.32 (H) 04/06/2018 02:46 AM    Calcium 7.8 (L) 04/06/2018 02:46 AM    Anion gap 7 04/06/2018 02:46 AM    BUN/Creatinine ratio 3 (L) 04/06/2018 02:46 AM    Alk.  phosphatase 97 04/04/2018 07:40 AM    Protein, total 5.4 (L) 04/04/2018 07:40 AM    Albumin 1.0 (L) 04/04/2018 07:40 AM    Globulin 4.4 (H) 04/04/2018 07:40 AM    A-G Ratio 0.2 (L) 04/04/2018 07:40 AM               Assessment:   Patient Active Hospital Problem List:   Peritonitis ([facility] (3/11/2018)     Generalized abdominal pain (4/4/2018)     Other constipation (4/4/2018)     Other ascites (4/4/2018)        Plan:    Plan for repeat CT when output of drain is less that 40 cc.    Emiliano Gonzáles NP

## 2018-04-06 NOTE — PROGRESS NOTES
Hospitalist Progress Note            This is a 78-year-old female with multiple medical problems including SLE, end-stage renal disease (on hemodialysis, TTS), pulmonary embolism, on Eliquis (2012), hyperlipidemia, hypertension, recent Candida peritonitis, chronic bilateral pain.  She comes over here because of abdominal pain since last 4 or 5 days. Daily Progress Note: 4/6/2018    Assessment/Plan:   Acute peritonitis (e coli) with sepsis  - also with recent candida peritonitis  - CT abd 3/11 Large amount of free intraperitoneal fluid appears somewhat loculated  - Peritoneal fluid heavy E coli on 3/11 and 3/18  - Antibiotics per ID  - continue Cefepime (previously on Vanc/ Zosyn/ voriconazole)  - s/p paracentesis  - Repeat CT on 3/17 shows improvement on loculated fluid  - Repeat CT abd 3/23 The crescentic peritoneal collection extending from right to left in the anterior abdomen is again noted. It is relatively unchanged in size and extent compared to the previous study. The drainage end of the pigtail catheter remains in the right side of the collection. Trace ascites is noted  - US 3/25 no drain able fluid lt abdomen  - The patient might need more drainage of abdominal fluid, but given loculations it may need to be surgically addressed. gen surg folllowing  - repeat CT abd 3/30 Stable peritoneal fluid collection containing a percutaneous drain  - Continue IV antibiotics till 4/6/2018 per ID  -drain exchanged and upsized by IR on 4/5; monitor output  - palliative care consulted for pain management, recs appreciated       HTN  -continue current meds     Thrombocytopenia   -resolved      Bilateral LE edema  -likely third-spacing  -Dopplers negative for DVT      SLE  -continue home meds  -D/C'd Imuran, allopurinol by nephro with acute infection and thrombocytopenia.       History of PE  - on Eliquis since 2012  - per documentation from prior attending: \"appreciate discussion with PMD 3/13.  The patient was seen by hematology in  but seems was lost to follow up. I think at that time the plan was to stop anticoagulation if her SLE is stable. Spoke to Dr Eliza Perez, he feels that the patient can come off Eliquis. His records do not suggest any recurrence of DVT/PE. Spoke to Dr Magda Boland, he has not seen the patient since >1 yr. If antiphospholipid antibodies negative then the patient can come off Eliquis\"  - 10/17 V/Q high probability for PE  - Per oncology the patient should be continued to Eliquis indefinitely      Hypophosphatemia:  -replete prn     ESRD  -on HD TTS      Anemia  -of chronic disease  -Transfused 2 units PRBC with HD 3/15     Hepatic steatosis    Severe protein calorie malnutrition  -Consult nutrition      Full code       Subjective:   Feels OK, abd pain unchanged compared to yesterday. No n/v/d, no dyspnea. Tolerating diet. Review of Systems:   As above    Objective:     Visit Vitals    BP (!) 162/112    Pulse 96    Temp 97.8 °F (36.6 °C)    Resp 16    Ht 5' 5\" (1.651 m)    Wt 71.6 kg (157 lb 14.4 oz)    SpO2 100%    BMI 26.28 kg/m2    O2 Flow Rate (L/min): 2 l/min O2 Device: Room air    Temp (24hrs), Av.3 °F (36.8 °C), Min:97.6 °F (36.4 °C), Max:98.7 °F (37.1 °C)            1901 -  0700  In: -   Out: 1550 [Drains:50]    EXAM:  General: Chronically-ill appearing, NAD    HEENT: Anicteric sclerae, pale conjunctiva, MMM  Neck:   Supple, trachea midline  Lungs:  CTA bilaterally. No Wheezing/Rhonchi/Rales, normal work of breathing. Heart:  Regular rhythm,  No murmur (), No Rubs, No Gallops  Abdomen: Distended. Mild diffuse tenderness.  Paracentesis drain in place draining brown-red opaque fluid, bs+  Extremities: Warm, ++peripheral edema  Neurologic:  CN 2-12 gi, normal speech, moves all extremities   Psych:   Not anxious nor agitated        Data Review:     Recent Results (from the past 24 hour(s))   GLUCOSE, POC    Collection Time: 18 12:02 PM   Result Value Ref Range    Glucose (POC) 67 65 - 100 mg/dL    Performed by Bianca Cheatham    GLUCOSE, POC    Collection Time: 04/05/18 12:16 PM   Result Value Ref Range    Glucose (POC) 99 65 - 100 mg/dL    Performed by Bianca Cheatham    GLUCOSE, POC    Collection Time: 04/05/18  4:59 PM   Result Value Ref Range    Glucose (POC) 248 (H) 65 - 100 mg/dL    Performed by [id number], POC    Collection Time: 04/05/18  9:29 PM   Result Value Ref Range    Glucose (POC) 96 65 - 100 mg/dL    Performed by [id number], BASIC    Collection Time: 04/06/18  2:46 AM   Result Value Ref Range    Sodium 142 136 - 145 mmol/L    Potassium 4.1 3.5 - 5.1 mmol/L    Chloride 105 97 - 108 mmol/L    CO2 30 21 - 32 mmol/L    Anion gap 7 5 - 15 mmol/L    Glucose 80 65 - 100 mg/dL    BUN 9 6 - 20 MG/DL    Creatinine 3.32 (H) 0.55 - 1.02 MG/DL    BUN/Creatinine ratio 3 (L) 12 - 20      GFR est AA 20 (L) >60 ml/min/1.73m2    GFR est non-AA 16 (L) >60 ml/min/1.73m2    Calcium 7.8 (L) 8.5 - 10.1 MG/DL   PHOSPHORUS    Collection Time: 04/06/18  2:46 AM   Result Value Ref Range    Phosphorus 1.7 (L) 2.6 - 4.7 MG/DL   GLUCOSE, POC    Collection Time: 04/06/18  5:59 AM   Result Value Ref Range    Glucose (POC) 87 65 - 100 mg/dL    Performed by Vincent Rubalcava        Principal Problem:    Peritonitis ([address] (3/11/2018)    Active Problems:    Generalized abdominal pain (4/4/2018)      Other constipation (4/4/2018)      Other ascites (4/4/2018)        Medications reviewed  Current Facility-Administered Medications   Medication Dose Route Frequency    oxyCODONE IR (ROXICODONE) tablet 10 mg  10 mg Oral Q6H PRN    polyethylene glycol (MIRALAX) packet 17 g  17 g Oral DAILY    insulin lispro (HUMALOG) injection   SubCUTAneous AC&HS    senna (SENOKOT) tablet 17.2 mg  2 Tab Oral QHS    balsam peru-castor oil (VENELEX)  mg/gram ointment   Topical Q8H    amLODIPine (NORVASC) tablet 5 mg  5 mg Oral DAILY    carvedilol (COREG) tablet 12.5 mg  12.5 mg Oral BID WITH MEALS    dextrose 5% and 0.9% NaCl infusion  15 mL/hr IntraVENous CONTINUOUS    hydrALAZINE (APRESOLINE) 20 mg/mL injection 5 mg  5 mg IntraVENous Q6H PRN    cefepime (MAXIPIME) 1 g in 0.9% sodium chloride (MBP/ADV) 50 mL ADV  1 g IntraVENous Q24H    apixaban (ELIQUIS) tablet 5 mg  5 mg Oral BID    0.9% sodium chloride infusion 250 mL  250 mL IntraVENous PRN    HYDROmorphone (DILAUDID) injection 0.5 mg  0.5 mg IntraVENous Q3H PRN    glucose chewable tablet 16 g  4 Tab Oral PRN    dextrose (D50W) injection syrg 12.5-25 g  12.5-25 g IntraVENous PRN    glucagon (GLUCAGEN) injection 1 mg  1 mg IntraMUSCular PRN    epoetin pia (EPOGEN;PROCRIT) 14,000 Units  14,000 Units SubCUTAneous DIALYSIS TUE, THU & SAT    predniSONE (DELTASONE) tablet 5 mg  5 mg Oral DAILY    pantoprazole (PROTONIX) tablet 40 mg  40 mg Oral ACB    cyanocobalamin (VITAMIN B12) tablet 2,500 mcg  2,500 mcg Oral DAILY    hydroxychloroquine (PLAQUENIL) tablet 200 mg  200 mg Oral BID    albuterol (PROVENTIL VENTOLIN) nebulizer solution 2.5 mg  2.5 mg Nebulization Q4H PRN    gemfibrozil (LOPID) tablet 300 mg  300 mg Oral DAILY    amitriptyline (ELAVIL) tablet 25 mg  25 mg Oral QHS    sodium chloride (NS) flush 5-10 mL  5-10 mL IntraVENous Q8H    sodium chloride (NS) flush 5-10 mL  5-10 mL IntraVENous PRN    ondansetron (ZOFRAN) injection 4 mg  4 mg IntraVENous Q4H PRN    arformoterol (BROVANA) neb solution 15 mcg  15 mcg Nebulization BID RT    And    budesonide (PULMICORT) 500 mcg/2 ml nebulizer suspension  500 mcg Nebulization BID RT       Ksenia Albright MD

## 2018-04-06 NOTE — PROGRESS NOTES
Physical Therapy  Orders received. Chart reviewed. Entered room and introduced self/role of PT. Pt reports stomach pain and does not feel up to ambulating at this time. She reports her mother is coming to the hospital later and she gets up to walk when she is present. Encouraged pt to attempt mobilizing later today and 3x/day throughout the weekend. She was in agreement. Will f/u as able/appropriate. Thanks! With nursing staff, recommend OOB to chair 3x/day with supervision assist in order to maintain strength, endurance and independence.    Travis Car, PT

## 2018-04-06 NOTE — PROGRESS NOTES
Gen Surg @ [facility]  Attending addendum:  I supervised the NP Salomon Brown) and reviewed the progress note. I visited and examined the pt independently.   Drain output is more serous than sanguinous    Patient Vitals for the past 12 hrs:   Temp Pulse Resp BP SpO2   04/06/18 1352 98.6 °F (37 °C) 98 16 131/88 96 %   04/06/18 0906 97.8 °F (36.6 °C) 96 16 (!) 162/112 100 %   04/06/18 0901 - - - - 95 %   04/06/18 0242 98.6 °F (37 °C) 95 16 129/85 95 %        Physical Exam:   Gen NAD  Abd Diffuse tenderness, drain output serosanguinous     Hospital Problems as of 4/6/2018  Date Reviewed: 3/11/2018          Codes Class Noted - Resolved POA    Generalized abdominal pain ICD-10-CM: R10.84  ICD-9-CM: 789.07  4/4/2018 - Present Unknown        Other constipation ICD-10-CM: K59.09  ICD-9-CM: 564.09  4/4/2018 - Present Unknown        Other ascites ICD-10-CM: R18.8  ICD-9-CM: 789.59  4/4/2018 - Present Unknown        * (Principal)Peritonitis ([facility] [address] ICD-10-CM: K65.9  ICD-9-CM: 567.9  3/11/2018 - Present Yes              I agree with the APC's assessment and plan with the following additions/modifications:  Wait for drain output to decrease    Signed By: Asha Wheeler MD     April 6, 2018

## 2018-04-07 LAB
GLUCOSE BLD STRIP.AUTO-MCNC: 110 MG/DL (ref 65–100)
GLUCOSE BLD STRIP.AUTO-MCNC: 73 MG/DL (ref 65–100)
GLUCOSE BLD STRIP.AUTO-MCNC: 84 MG/DL (ref 65–100)
GLUCOSE BLD STRIP.AUTO-MCNC: 84 MG/DL (ref 65–100)
GLUCOSE BLD STRIP.AUTO-MCNC: 96 MG/DL (ref 65–100)
SERVICE CMNT-IMP: ABNORMAL
SERVICE CMNT-IMP: NORMAL

## 2018-04-07 PROCEDURE — 74011250637 HC RX REV CODE- 250/637: Performed by: INTERNAL MEDICINE

## 2018-04-07 PROCEDURE — 74011250637 HC RX REV CODE- 250/637: Performed by: NURSE PRACTITIONER

## 2018-04-07 PROCEDURE — 74011000250 HC RX REV CODE- 250: Performed by: HOSPITALIST

## 2018-04-07 PROCEDURE — 65270000029 HC RM PRIVATE

## 2018-04-07 PROCEDURE — 90935 HEMODIALYSIS ONE EVALUATION: CPT

## 2018-04-07 PROCEDURE — 82962 GLUCOSE BLOOD TEST: CPT

## 2018-04-07 PROCEDURE — 74011636637 HC RX REV CODE- 636/637: Performed by: HOSPITALIST

## 2018-04-07 PROCEDURE — 74011250636 HC RX REV CODE- 250/636: Performed by: HOSPITALIST

## 2018-04-07 PROCEDURE — 74011250636 HC RX REV CODE- 250/636: Performed by: INTERNAL MEDICINE

## 2018-04-07 PROCEDURE — 74011250637 HC RX REV CODE- 250/637: Performed by: HOSPITALIST

## 2018-04-07 PROCEDURE — 87205 SMEAR GRAM STAIN: CPT | Performed by: INTERNAL MEDICINE

## 2018-04-07 PROCEDURE — 94640 AIRWAY INHALATION TREATMENT: CPT

## 2018-04-07 RX ADMIN — HYDROMORPHONE HYDROCHLORIDE 0.5 MG: 2 INJECTION, SOLUTION INTRAMUSCULAR; INTRAVENOUS; SUBCUTANEOUS at 23:04

## 2018-04-07 RX ADMIN — OXYCODONE HYDROCHLORIDE 10 MG: 5 TABLET ORAL at 03:34

## 2018-04-07 RX ADMIN — CASTOR OIL AND BALSAM, PERU: 788; 87 OINTMENT TOPICAL at 15:17

## 2018-04-07 RX ADMIN — HYDROXYCHLOROQUINE SULFATE 200 MG: 200 TABLET, FILM COATED ORAL at 09:33

## 2018-04-07 RX ADMIN — CASTOR OIL AND BALSAM, PERU: 788; 87 OINTMENT TOPICAL at 06:31

## 2018-04-07 RX ADMIN — AMLODIPINE BESYLATE 5 MG: 5 TABLET ORAL at 09:33

## 2018-04-07 RX ADMIN — BUDESONIDE 500 MCG: 0.5 INHALANT RESPIRATORY (INHALATION) at 20:02

## 2018-04-07 RX ADMIN — Medication 2500 MCG: at 09:33

## 2018-04-07 RX ADMIN — HYDROMORPHONE HYDROCHLORIDE: 2 INJECTION, SOLUTION INTRAMUSCULAR; INTRAVENOUS; SUBCUTANEOUS at 10:53

## 2018-04-07 RX ADMIN — PANTOPRAZOLE SODIUM 40 MG: 40 TABLET, DELAYED RELEASE ORAL at 06:30

## 2018-04-07 RX ADMIN — GEMFIBROZIL 300 MG: 600 TABLET ORAL at 09:33

## 2018-04-07 RX ADMIN — HYDROMORPHONE HYDROCHLORIDE: 2 INJECTION, SOLUTION INTRAMUSCULAR; INTRAVENOUS; SUBCUTANEOUS at 15:17

## 2018-04-07 RX ADMIN — ARFORMOTEROL TARTRATE 15 MCG: 15 SOLUTION RESPIRATORY (INHALATION) at 08:00

## 2018-04-07 RX ADMIN — HYDROMORPHONE HYDROCHLORIDE 0.5 MG: 2 INJECTION, SOLUTION INTRAMUSCULAR; INTRAVENOUS; SUBCUTANEOUS at 19:18

## 2018-04-07 RX ADMIN — AMITRIPTYLINE HYDROCHLORIDE 25 MG: 10 TABLET, FILM COATED ORAL at 21:25

## 2018-04-07 RX ADMIN — HYDROMORPHONE HYDROCHLORIDE 0.5 MG: 2 INJECTION, SOLUTION INTRAMUSCULAR; INTRAVENOUS; SUBCUTANEOUS at 00:07

## 2018-04-07 RX ADMIN — CASTOR OIL AND BALSAM, PERU: 788; 87 OINTMENT TOPICAL at 21:28

## 2018-04-07 RX ADMIN — CARVEDILOL 12.5 MG: 12.5 TABLET, FILM COATED ORAL at 17:25

## 2018-04-07 RX ADMIN — HYDROMORPHONE HYDROCHLORIDE 0.5 MG: 2 INJECTION, SOLUTION INTRAMUSCULAR; INTRAVENOUS; SUBCUTANEOUS at 06:31

## 2018-04-07 RX ADMIN — ARFORMOTEROL TARTRATE 15 MCG: 15 SOLUTION RESPIRATORY (INHALATION) at 20:02

## 2018-04-07 RX ADMIN — Medication 10 ML: at 14:00

## 2018-04-07 RX ADMIN — BUDESONIDE 500 MCG: 0.5 INHALANT RESPIRATORY (INHALATION) at 08:01

## 2018-04-07 RX ADMIN — ERYTHROPOIETIN 14000 UNITS: 10000 INJECTION, SOLUTION INTRAVENOUS; SUBCUTANEOUS at 17:25

## 2018-04-07 RX ADMIN — POLYETHYLENE GLYCOL 3350 17 G: 17 POWDER, FOR SOLUTION ORAL at 09:33

## 2018-04-07 RX ADMIN — HYDROXYCHLOROQUINE SULFATE 200 MG: 200 TABLET, FILM COATED ORAL at 17:25

## 2018-04-07 RX ADMIN — PREDNISONE 5 MG: 5 TABLET ORAL at 09:33

## 2018-04-07 RX ADMIN — Medication 10 ML: at 06:31

## 2018-04-07 RX ADMIN — APIXABAN 5 MG: 5 TABLET, FILM COATED ORAL at 09:33

## 2018-04-07 RX ADMIN — APIXABAN 5 MG: 5 TABLET, FILM COATED ORAL at 17:25

## 2018-04-07 RX ADMIN — Medication 10 ML: at 21:27

## 2018-04-07 RX ADMIN — SENNOSIDES 17.2 MG: 8.6 TABLET, FILM COATED ORAL at 21:25

## 2018-04-07 RX ADMIN — CARVEDILOL 12.5 MG: 12.5 TABLET, FILM COATED ORAL at 09:33

## 2018-04-07 RX ADMIN — ONDANSETRON 4 MG: 2 INJECTION INTRAMUSCULAR; INTRAVENOUS at 00:07

## 2018-04-07 NOTE — DIALYSIS
[facility]  [phone]    Vitals   Pre   Post   Assessment   Pre   Post     Temp  Temp: 97.6 °F (36.4 °C) (04/07/18 1228)  97.5 LOC  Patient AXOX4 Patient AXOX4   HR   Pulse (Heart Rate): 94 (04/07/18 1228) 94 Lungs   Diminished in bases Diminished in bases    B/P   BP: (!) 138/92 (Hd started) (04/07/18 1228) 122/89 Cardiac   HR Regular Hr Regular    Resp   Resp Rate: 16 (04/07/18 1228) 18 Skin    Warm and dry   Warm and dry   Pain level  Pain Intensity 1: 9 (04/07/18 1054) Pt denies Edema  BLE 2+ pitting, RUE 2+ pitting     BLE 2+ pitting, RUE pitting   Orders:    Duration:   Start:   procedure start time 1215 End:   procedure end time  1301 Total:      Dialyzer:   Dialyzer/Set Up Inspection: Revaclear (04/07/18 1228)   K Bath:   Dialysate K (mEq/L): 3 (04/07/18 1228)   Ca Bath:   Dialysate CA (mEq/L): 2.5 (04/07/18 1228)   Na/Bicarb:   Dialysate NA (mEq/L): 140 (04/07/18 1228)   Target Fluid Removal:   Goal/Amount of Fluid to Remove (mL): 3000 mL (04/07/18 1228)   Access     Type & Location:   Right AV Graft-Right upper arm swollen and edemaous. +bruit and +thrill. 15G needled used. Difficulty to cannulate both ports. Assistance of another RN needed for the venous port. Both ports flushed with normal saline per policy. Dialysis iniatited.       Labs     Obtained/Reviewed   Critical Results Called   Date when labs were drawn-  Hgb-    HGB   Date Value Ref Range Status   04/05/2018 8.1 (L) 11.5 - 16.0 g/dL Final     K-    Potassium   Date Value Ref Range Status   04/06/2018 4.1 3.5 - 5.1 mmol/L Final     Ca-   Calcium   Date Value Ref Range Status   04/06/2018 7.8 (L) 8.5 - 10.1 MG/DL Final     Bun-   BUN   Date Value Ref Range Status   04/06/2018 9 6 - 20 MG/DL Final     Creat-   Creatinine   Date Value Ref Range Status   04/06/2018 3.32 (H) 0.55 - 1.02 MG/DL Final     Comment:     INVESTIGATED PER DELTA CHECK PROTOCOL        Medications/ Blood Products Given     Name   Dose Route and Time     No medications ordered                Blood Volume Processed (BVP):    8.1 L Net Fluid   Removed:  85 ml   Comments   Time Out Done: yes  Primary Nurse Rpt Pre: FEDERICA Montero RN  Primary Nurse Rpt Post: FEDERICA Montero RN  Pt Education: Patient educated about access, medications, diet, and treatment. Care Plan: Access needs to be evaluated. TTS dialysis  Tx Summary: Patient tolerated 30 minutes of treatment. Venous pressures were in the 300's due to having difficulty sticking the patient. Lines  were reversed with no successful. Patient eventually start to ooze from venous port and treatment was stopped, MD notified, and all possible blood was returned. Hemostasis achieved in less than 20 minutes. Report given to primary RN. Admiting Diagnosis:  Pt's previous clinic- [facility]  Consent signed - Informed Consent Verified: Yes (04/07/18 0284)  [facility] Consent - verified  Hepatitis Status- Neg Ag 3/31/17, ab immunue 3/31/2018  Machine #- Machine Number: [id number] (04/07/18 1408)  Telemetry status-  Pre-dialysis wt. - Pre-Dialysis Weight: 72.5 kg (159 lb 13.3 oz) (04/07/18 7126)

## 2018-04-07 NOTE — PROGRESS NOTES
Hospitalist Progress Note            This is a 80-year-old female with multiple medical problems including SLE, end-stage renal disease (on hemodialysis, TTS), pulmonary embolism, on Eliquis (2012), hyperlipidemia, hypertension, recent Candida peritonitis, chronic bilateral pain.  She comes over here because of abdominal pain since last 4 or 5 days. Daily Progress Note: 4/7/2018    Assessment/Plan:   Acute peritonitis (e coli) with sepsis  - also with recent candida peritonitis  - CT abd 3/11 Large amount of free intraperitoneal fluid appears somewhat loculated  - Peritoneal fluid heavy E coli on 3/11 and 3/18  - Antibiotics per ID  - s/p paracentesis  - Repeat CT on 3/17 shows improvement on loculated fluid  - Repeat CT abd 3/23 The crescentic peritoneal collection extending from right to left in the anterior abdomen is again noted. It is relatively unchanged in size and extent compared to the previous study. The drainage end of the pigtail catheter remains in the right side of the collection. Trace ascites is noted  - US 3/25 no drain able fluid lt abdomen  - The patient might need more drainage of abdominal fluid, but given loculations it may need to be surgically addressed. gen surg folllowing  - repeat CT abd 3/30 Stable peritoneal fluid collection containing a percutaneous drain  - completed IV abx on 4/6/18  -drain exchanged and upsized by IR on 4/5; monitor output  - palliative care consulted for pain management, recs appreciated       HTN  -continue current meds     Thrombocytopenia   -resolved      Bilateral LE edema  -likely third-spacing  -Dopplers negative for DVT      SLE  -continue home meds  -D/C'd Imuran, allopurinol by nephro with acute infection and thrombocytopenia.       History of PE  - on Eliquis since 2012  - per documentation from prior attending: \"appreciate discussion with PMD 3/13. The patient was seen by hematology in 2013 but seems was lost to follow up.  I think at that time the plan was to stop anticoagulation if her SLE is stable. Spoke to Dr Suri Bennett, he feels that the patient can come off Eliquis. His records do not suggest any recurrence of DVT/PE. Spoke to Dr Juvenal Ventura, he has not seen the patient since >1 yr. If antiphospholipid antibodies negative then the patient can come off Eliquis\"  - 10/17 V/Q high probability for PE  - Per oncology the patient should be continued to Eliquis indefinitely      Hypophosphatemia:  -replete prn     ESRD  -on HD TTS      Anemia  -of chronic disease  -Transfused 2 units PRBC with HD 3/15     Hepatic steatosis    Severe protein calorie malnutrition  -Consult nutrition      Full code       Subjective:   abd pain slightly improved, no n/v/d, no dyspnea    Review of Systems:   As above    Objective:     Visit Vitals    BP (!) 136/94 (BP 1 Location: Left arm, BP Patient Position: At rest)    Pulse (!) 104    Temp 98.8 °F (37.1 °C)    Resp 14    Ht 5' 5\" (1.651 m)    Wt 71.6 kg (157 lb 14.4 oz)    SpO2 95%    BMI 26.28 kg/m2    O2 Flow Rate (L/min): 2 l/min O2 Device: Room air    Temp (24hrs), Av.7 °F (37.1 °C), Min:98.4 °F (36.9 °C), Max:98.9 °F (37.2 °C)            1901 -  0700  In: -   Out: 205 [Drains:205]    EXAM:  General: Chronically-ill appearing, NAD    HEENT: Anicteric sclerae, pale conjunctiva, MMM  Neck:   Supple, trachea midline  Lungs:  CTA bilaterally. No Wheezing/Rhonchi/Rales, normal work of breathing. Heart:  Regular rhythm,  No murmur (), No Rubs, No Gallops  Abdomen: Distended. Mild diffuse tenderness.  Paracentesis drain in place draining brown-red opaque fluid, bs+  Extremities: Warm, ++peripheral edema  Neurologic:  CN 2-12 gi, normal speech, moves all extremities   Psych:   Not anxious nor agitated        Data Review:     Recent Results (from the past 24 hour(s))   GLUCOSE, POC    Collection Time: 18  5:31 PM   Result Value Ref Range    Glucose (POC) 86 65 - 100 mg/dL    Performed by [employer] CAROLIN(CON)    GLUCOSE, POC    Collection Time: 04/06/18  9:10 PM   Result Value Ref Range    Glucose (POC) 69 65 - 100 mg/dL    Performed by Reena Nelson    GLUCOSE, POC    Collection Time: 04/06/18  9:48 PM   Result Value Ref Range    Glucose (POC) 87 65 - 100 mg/dL    Performed by Reena Nelson    GLUCOSE, POC    Collection Time: 04/07/18  6:36 AM   Result Value Ref Range    Glucose (POC) 73 65 - 100 mg/dL    Performed by [address], POC    Collection Time: 04/07/18  6:54 AM   Result Value Ref Range    Glucose (POC) 84 65 - 100 mg/dL    Performed by [address], POC    Collection Time: 04/07/18 11:12 AM   Result Value Ref Range    Glucose (POC) 84 65 - 100 mg/dL    Performed by Kim Norris        Principal Problem:    Peritonitis ([address] (3/11/2018)    Active Problems:    Generalized abdominal pain (4/4/2018)      Other constipation (4/4/2018)      Other ascites (4/4/2018)        Medications reviewed  Current Facility-Administered Medications   Medication Dose Route Frequency    HYDROmorphone (PF) (DILAUDID) injection 0.5 mg  0.5 mg IntraVENous Q4H PRN    oxyCODONE IR (ROXICODONE) tablet 10 mg  10 mg Oral Q6H PRN    polyethylene glycol (MIRALAX) packet 17 g  17 g Oral DAILY    senna (SENOKOT) tablet 17.2 mg  2 Tab Oral QHS    balsam peru-castor oil (VENELEX)  mg/gram ointment   Topical Q8H    amLODIPine (NORVASC) tablet 5 mg  5 mg Oral DAILY    carvedilol (COREG) tablet 12.5 mg  12.5 mg Oral BID WITH MEALS    dextrose 5% and 0.9% NaCl infusion  15 mL/hr IntraVENous CONTINUOUS    hydrALAZINE (APRESOLINE) 20 mg/mL injection 5 mg  5 mg IntraVENous Q6H PRN    apixaban (ELIQUIS) tablet 5 mg  5 mg Oral BID    0.9% sodium chloride infusion 250 mL  250 mL IntraVENous PRN    glucose chewable tablet 16 g  4 Tab Oral PRN    dextrose (D50W) injection syrg 12.5-25 g  12.5-25 g IntraVENous PRN    glucagon (GLUCAGEN) injection 1 mg  1 mg IntraMUSCular PRN    epoetin pia (EPOGEN;PROCRIT) 14,000 Units  14,000 Units SubCUTAneous DIALYSIS TUE, THU & SAT    predniSONE (DELTASONE) tablet 5 mg  5 mg Oral DAILY    pantoprazole (PROTONIX) tablet 40 mg  40 mg Oral ACB    cyanocobalamin (VITAMIN B12) tablet 2,500 mcg  2,500 mcg Oral DAILY    hydroxychloroquine (PLAQUENIL) tablet 200 mg  200 mg Oral BID    albuterol (PROVENTIL VENTOLIN) nebulizer solution 2.5 mg  2.5 mg Nebulization Q4H PRN    gemfibrozil (LOPID) tablet 300 mg  300 mg Oral DAILY    amitriptyline (ELAVIL) tablet 25 mg  25 mg Oral QHS    sodium chloride (NS) flush 5-10 mL  5-10 mL IntraVENous Q8H    sodium chloride (NS) flush 5-10 mL  5-10 mL IntraVENous PRN    ondansetron (ZOFRAN) injection 4 mg  4 mg IntraVENous Q4H PRN    arformoterol (BROVANA) neb solution 15 mcg  15 mcg Nebulization BID RT    And    budesonide (PULMICORT) 500 mcg/2 ml nebulizer suspension  500 mcg Nebulization BID RT       Saul Daniel MD

## 2018-04-07 NOTE — PROGRESS NOTES
Problem: Falls - Risk of  Goal: *Absence of Falls  Document Alex Fall Risk and appropriate interventions in the flowsheet.    Outcome: Progressing Towards Goal  Fall Risk Interventions:  Mobility Interventions: Communicate number of staff needed for ambulation/transfer, OT consult for ADLs, Patient to call before getting OOB, PT Consult for mobility concerns, PT Consult for assist device competence, Strengthening exercises (ROM-active/passive), Utilize walker, cane, or other assitive device, Utilize gait belt for transfers/ambulation    Mentation Interventions: Door open when patient unattended, Family/sitter at bedside, Increase mobility    Medication Interventions: Evaluate medications/consider consulting pharmacy, Patient to call before getting OOB, Teach patient to arise slowly    Elimination Interventions: Call light in reach, Elevated toilet seat, Patient to call for help with toileting needs, Toilet paper/wipes in reach, Toileting schedule/hourly rounds

## 2018-04-08 LAB
GLUCOSE BLD STRIP.AUTO-MCNC: 125 MG/DL (ref 65–100)
GLUCOSE BLD STRIP.AUTO-MCNC: 94 MG/DL (ref 65–100)
GLUCOSE BLD STRIP.AUTO-MCNC: 99 MG/DL (ref 65–100)
SERVICE CMNT-IMP: ABNORMAL
SERVICE CMNT-IMP: NORMAL
SERVICE CMNT-IMP: NORMAL

## 2018-04-08 PROCEDURE — 82962 GLUCOSE BLOOD TEST: CPT

## 2018-04-08 PROCEDURE — 74011250637 HC RX REV CODE- 250/637: Performed by: INTERNAL MEDICINE

## 2018-04-08 PROCEDURE — 65270000029 HC RM PRIVATE

## 2018-04-08 PROCEDURE — 74011250637 HC RX REV CODE- 250/637: Performed by: NURSE PRACTITIONER

## 2018-04-08 PROCEDURE — 74011000250 HC RX REV CODE- 250: Performed by: HOSPITALIST

## 2018-04-08 PROCEDURE — 74011250636 HC RX REV CODE- 250/636: Performed by: INTERNAL MEDICINE

## 2018-04-08 PROCEDURE — 74011250636 HC RX REV CODE- 250/636: Performed by: HOSPITALIST

## 2018-04-08 PROCEDURE — 94640 AIRWAY INHALATION TREATMENT: CPT

## 2018-04-08 PROCEDURE — 74011000258 HC RX REV CODE- 258: Performed by: INTERNAL MEDICINE

## 2018-04-08 PROCEDURE — 74011250637 HC RX REV CODE- 250/637: Performed by: HOSPITALIST

## 2018-04-08 PROCEDURE — 74011636637 HC RX REV CODE- 636/637: Performed by: HOSPITALIST

## 2018-04-08 RX ADMIN — CARVEDILOL 12.5 MG: 12.5 TABLET, FILM COATED ORAL at 08:57

## 2018-04-08 RX ADMIN — HYDROMORPHONE HYDROCHLORIDE 0.5 MG: 2 INJECTION, SOLUTION INTRAMUSCULAR; INTRAVENOUS; SUBCUTANEOUS at 13:23

## 2018-04-08 RX ADMIN — ONDANSETRON 4 MG: 2 INJECTION INTRAMUSCULAR; INTRAVENOUS at 19:10

## 2018-04-08 RX ADMIN — ARFORMOTEROL TARTRATE 15 MCG: 15 SOLUTION RESPIRATORY (INHALATION) at 09:53

## 2018-04-08 RX ADMIN — POLYETHYLENE GLYCOL 3350 17 G: 17 POWDER, FOR SOLUTION ORAL at 08:57

## 2018-04-08 RX ADMIN — SENNOSIDES 17.2 MG: 8.6 TABLET, FILM COATED ORAL at 21:40

## 2018-04-08 RX ADMIN — BUDESONIDE 500 MCG: 0.5 INHALANT RESPIRATORY (INHALATION) at 19:57

## 2018-04-08 RX ADMIN — CASTOR OIL AND BALSAM, PERU: 788; 87 OINTMENT TOPICAL at 05:12

## 2018-04-08 RX ADMIN — DEXTROSE MONOHYDRATE AND SODIUM CHLORIDE 15 ML/HR: 5; .9 INJECTION, SOLUTION INTRAVENOUS at 21:39

## 2018-04-08 RX ADMIN — APIXABAN 5 MG: 5 TABLET, FILM COATED ORAL at 08:57

## 2018-04-08 RX ADMIN — HYDROXYCHLOROQUINE SULFATE 200 MG: 200 TABLET, FILM COATED ORAL at 08:57

## 2018-04-08 RX ADMIN — ARFORMOTEROL TARTRATE 15 MCG: 15 SOLUTION RESPIRATORY (INHALATION) at 19:57

## 2018-04-08 RX ADMIN — CASTOR OIL AND BALSAM, PERU: 788; 87 OINTMENT TOPICAL at 21:41

## 2018-04-08 RX ADMIN — Medication 2500 MCG: at 08:57

## 2018-04-08 RX ADMIN — ONDANSETRON 4 MG: 2 INJECTION INTRAMUSCULAR; INTRAVENOUS at 14:08

## 2018-04-08 RX ADMIN — AMLODIPINE BESYLATE 5 MG: 5 TABLET ORAL at 08:58

## 2018-04-08 RX ADMIN — Medication 10 ML: at 21:42

## 2018-04-08 RX ADMIN — CARVEDILOL 12.5 MG: 12.5 TABLET, FILM COATED ORAL at 17:27

## 2018-04-08 RX ADMIN — BUDESONIDE 500 MCG: 0.5 INHALANT RESPIRATORY (INHALATION) at 09:53

## 2018-04-08 RX ADMIN — AMITRIPTYLINE HYDROCHLORIDE 25 MG: 10 TABLET, FILM COATED ORAL at 21:39

## 2018-04-08 RX ADMIN — PANTOPRAZOLE SODIUM 40 MG: 40 TABLET, DELAYED RELEASE ORAL at 07:40

## 2018-04-08 RX ADMIN — HYDROMORPHONE HYDROCHLORIDE 0.5 MG: 2 INJECTION, SOLUTION INTRAMUSCULAR; INTRAVENOUS; SUBCUTANEOUS at 17:28

## 2018-04-08 RX ADMIN — APIXABAN 5 MG: 5 TABLET, FILM COATED ORAL at 17:27

## 2018-04-08 RX ADMIN — HYDROXYCHLOROQUINE SULFATE 200 MG: 200 TABLET, FILM COATED ORAL at 17:27

## 2018-04-08 RX ADMIN — ONDANSETRON 4 MG: 2 INJECTION INTRAMUSCULAR; INTRAVENOUS at 09:31

## 2018-04-08 RX ADMIN — CASTOR OIL AND BALSAM, PERU: 788; 87 OINTMENT TOPICAL at 13:24

## 2018-04-08 RX ADMIN — HYDROMORPHONE HYDROCHLORIDE 0.5 MG: 2 INJECTION, SOLUTION INTRAMUSCULAR; INTRAVENOUS; SUBCUTANEOUS at 05:08

## 2018-04-08 RX ADMIN — PREDNISONE 5 MG: 5 TABLET ORAL at 08:57

## 2018-04-08 RX ADMIN — GEMFIBROZIL 300 MG: 600 TABLET ORAL at 08:57

## 2018-04-08 RX ADMIN — HYDROMORPHONE HYDROCHLORIDE 0.5 MG: 2 INJECTION, SOLUTION INTRAMUSCULAR; INTRAVENOUS; SUBCUTANEOUS at 21:40

## 2018-04-08 RX ADMIN — Medication 10 ML: at 05:09

## 2018-04-08 RX ADMIN — HYDROMORPHONE HYDROCHLORIDE 0.5 MG: 2 INJECTION, SOLUTION INTRAMUSCULAR; INTRAVENOUS; SUBCUTANEOUS at 08:58

## 2018-04-08 NOTE — PROGRESS NOTES
Patient name: Pro Pope  MRN: [id number]    Nephrology Progress note:    Assessment:    ESRD: on HD [facility]     AVG - poor function    E. Coli Bacteremia: source intra-abd source/ E. Coli peritonitis. IV zosyn. Repeat Bld Cx 3/11-> Neg. Recent hx of Candida peritonitis. Needs further IR intervention to appropriately drain abd    HTN:    Hx of PE: on Eliquis    Lupus: Anemia 2 to ESRD/Lupus: Hgb remains below goal - on EPO    SHPT: Hypophosphatemia-> give Neutra-Phos    Protein malnutrition    Hypoglycemia: wean D5NS fluids as tolerated    Edema: 3rd spacing-> increase UF on HD    Plan:    S/p only 30 minutes of HD yesterday b/c poorly functioning AVG  No further HD needed at this time  Will ask Dr. Asiya Bobby to evaluate AVG on Monday    Subjective:  Denies complaint at this time. We discussed the plan.    ROS:  No n/v  +Abd pain         Exam:  Visit Vitals    /83 (BP 1 Location: Left arm, BP Patient Position: At rest)    Pulse 90    Temp 97.7 °F (36.5 °C)    Resp 14    Ht 5' 5\" (1.651 m)    Wt 68.4 kg (150 lb 14.4 oz)    SpO2 97%    BMI 25.11 kg/m2     Wt Readings from Last 3 Encounters:   04/08/18 68.4 kg (150 lb 14.4 oz)   03/05/18 68 kg (150 lb)   01/29/18 69.4 kg (153 lb)       Intake/Output Summary (Last 24 hours) at 04/08/18 1228  Last data filed at 04/08/18 0806   Gross per 24 hour   Intake                0 ml   Output              186 ml   Net             -186 ml       NAD  CTAB/l  RUE/RLE edema  Abd drain, mild TTP      Labs/Data:    Lab Results   Component Value Date/Time    WBC 4.2 04/05/2018 04:09 AM    Hemoglobin (POC) 7.8 (L) 01/19/2018 12:30 PM    HGB 8.1 (L) 04/05/2018 04:09 AM    Hematocrit (POC) 23 (L) 01/19/2018 12:30 PM    HCT 27.3 (L) 04/05/2018 04:09 AM    PLATELET 229 64/76/8429 04:09 AM    MCV 88.3 04/05/2018 04:09 AM       Lab Results   Component Value Date/Time    Sodium 142 04/06/2018 02:46 AM    Potassium 4.1 04/06/2018 02:46 AM    Chloride 105 04/06/2018 02:46 AM    CO2 30 04/06/2018 02:46 AM    Anion gap 7 04/06/2018 02:46 AM    Glucose 80 04/06/2018 02:46 AM    BUN 9 04/06/2018 02:46 AM    Creatinine 3.32 (H) 04/06/2018 02:46 AM    BUN/Creatinine ratio 3 (L) 04/06/2018 02:46 AM    GFR est AA 20 (L) 04/06/2018 02:46 AM    GFR est non-AA 16 (L) 04/06/2018 02:46 AM    Calcium 7.8 (L) 04/06/2018 02:46 AM        Discussed with patient and RN

## 2018-04-08 NOTE — PROGRESS NOTES
Hospitalist Progress Note            This is a 27-year-old female with multiple medical problems including SLE, end-stage renal disease (on hemodialysis, TTS), pulmonary embolism, on Eliquis (2012), hyperlipidemia, hypertension, recent Candida peritonitis, chronic bilateral pain.  She comes over here because of abdominal pain since last 4 or 5 days. Daily Progress Note: 4/8/2018    Assessment/Plan:   Acute peritonitis (e coli) with sepsis  - also with recent candida peritonitis  - CT abd 3/11 Large amount of free intraperitoneal fluid appears somewhat loculated  - Peritoneal fluid heavy E coli on 3/11 and 3/18  - Antibiotics per ID  - s/p paracentesis  - Repeat CT on 3/17 shows improvement on loculated fluid  - Repeat CT abd 3/23 The crescentic peritoneal collection extending from right to left in the anterior abdomen is again noted. It is relatively unchanged in size and extent compared to the previous study. The drainage end of the pigtail catheter remains in the right side of the collection. Trace ascites is noted  - US 3/25 no drain able fluid lt abdomen  - The patient might need more drainage of abdominal fluid, but given loculations it may need to be surgically addressed. gen surg folllowing  - repeat CT abd 3/30 Stable peritoneal fluid collection containing a percutaneous drain  - completed IV abx on 4/6/18  -drain exchanged and upsized by IR on 4/5; monitor output  - palliative care consulted for pain management, recs appreciated       HTN  -continue current meds     Thrombocytopenia   -resolved      Bilateral LE edema  -likely third-spacing  -Dopplers negative for DVT      SLE  -continue home meds  -D/C'd Imuran, allopurinol by nephro with acute infection and thrombocytopenia.       History of PE  - on Eliquis since 2012  - per documentation from prior attending: \"appreciate discussion with PMD 3/13. The patient was seen by hematology in 2013 but seems was lost to follow up.  I think at that time the plan was to stop anticoagulation if her SLE is stable. Spoke to Dr Tomi Reich, he feels that the patient can come off Eliquis. His records do not suggest any recurrence of DVT/PE. Spoke to Dr Dolly Mcburney, he has not seen the patient since >1 yr. If antiphospholipid antibodies negative then the patient can come off Eliquis\"  - 10/17 V/Q high probability for PE  - Per oncology the patient should be continued to Eliquis indefinitely      Hypophosphatemia:  -replete prn     ESRD  -on HD TTS      Anemia  -of chronic disease  -Transfused 2 units PRBC with HD 3/15     Hepatic steatosis    Severe protein calorie malnutrition  -Consult nutrition    Constipation:  -bowel regimen      Full code       Subjective:   abd pain improving, no n/v/d, no dyspnea, reports constipation    Review of Systems:   As above    Objective:     Visit Vitals    /83    Pulse 90    Temp 97.7 °F (36.5 °C)    Resp 14    Ht 5' 5\" (1.651 m)    Wt 68.4 kg (150 lb 14.4 oz)    SpO2 97%    BMI 25.11 kg/m2    O2 Flow Rate (L/min): 2 l/min O2 Device: Room air    Temp (24hrs), Av.9 °F (36.6 °C), Min:97.5 °F (36.4 °C), Max:98.3 °F (36.8 °C)      701 -  1900  In: -   Out: 100 [Drains:100]     190 -  0700  In: -   Out: 115 [Drains:30]    EXAM:  General: Chronically-ill appearing, NAD    HEENT: Anicteric sclerae, MMM  Neck:   Supple, trachea midline  Lungs:  CTA bilaterally. No Wheezing/Rhonchi/Rales, normal work of breathing. Heart:  Regular rhythm,  No murmur (), No Rubs, No Gallops  Abdomen: Distended. Mild diffuse tenderness.  Paracentesis drain in place draining brown-red opaque fluid, bs+  Extremities: Warm, ++peripheral edema  Neurologic:  CN 2-12 gi, normal speech, moves all extremities   Psych:   Not anxious nor agitated        Data Review:     Recent Results (from the past 24 hour(s))   GLUCOSE, POC    Collection Time: 18 11:12 AM   Result Value Ref Range    Glucose (POC) 84 65 - 100 mg/dL Performed by Nitza Monroy    GLUCOSE, POC    Collection Time: 04/07/18  4:59 PM   Result Value Ref Range    Glucose (POC) 96 65 - 100 mg/dL    Performed by Shiva Doyle POC    Collection Time: 04/07/18  9:24 PM   Result Value Ref Range    Glucose (POC) 110 (H) 65 - 100 mg/dL    Performed by [facility], POC    Collection Time: 04/08/18  7:39 AM   Result Value Ref Range    Glucose (POC) 99 65 - 100 mg/dL    Performed by [facility] Doctor        Principal Problem:    Peritonitis ([address] (3/11/2018)    Active Problems:    Generalized abdominal pain (4/4/2018)      Other constipation (4/4/2018)      Other ascites (4/4/2018)        Medications reviewed  Current Facility-Administered Medications   Medication Dose Route Frequency    HYDROmorphone (PF) (DILAUDID) injection 0.5 mg  0.5 mg IntraVENous Q4H PRN    oxyCODONE IR (ROXICODONE) tablet 10 mg  10 mg Oral Q6H PRN    polyethylene glycol (MIRALAX) packet 17 g  17 g Oral DAILY    senna (SENOKOT) tablet 17.2 mg  2 Tab Oral QHS    balsam peru-castor oil (VENELEX)  mg/gram ointment   Topical Q8H    amLODIPine (NORVASC) tablet 5 mg  5 mg Oral DAILY    carvedilol (COREG) tablet 12.5 mg  12.5 mg Oral BID WITH MEALS    dextrose 5% and 0.9% NaCl infusion  15 mL/hr IntraVENous CONTINUOUS    hydrALAZINE (APRESOLINE) 20 mg/mL injection 5 mg  5 mg IntraVENous Q6H PRN    apixaban (ELIQUIS) tablet 5 mg  5 mg Oral BID    0.9% sodium chloride infusion 250 mL  250 mL IntraVENous PRN    glucose chewable tablet 16 g  4 Tab Oral PRN    dextrose (D50W) injection syrg 12.5-25 g  12.5-25 g IntraVENous PRN    glucagon (GLUCAGEN) injection 1 mg  1 mg IntraMUSCular PRN    epoetin pia (EPOGEN;PROCRIT) 14,000 Units  14,000 Units SubCUTAneous DIALYSIS TUE, THU & SAT    predniSONE (DELTASONE) tablet 5 mg  5 mg Oral DAILY    pantoprazole (PROTONIX) tablet 40 mg  40 mg Oral ACB    cyanocobalamin (VITAMIN B12) tablet 2,500 mcg  2,500 mcg Oral DAILY    hydroxychloroquine (PLAQUENIL) tablet 200 mg  200 mg Oral BID    albuterol (PROVENTIL VENTOLIN) nebulizer solution 2.5 mg  2.5 mg Nebulization Q4H PRN    gemfibrozil (LOPID) tablet 300 mg  300 mg Oral DAILY    amitriptyline (ELAVIL) tablet 25 mg  25 mg Oral QHS    sodium chloride (NS) flush 5-10 mL  5-10 mL IntraVENous Q8H    sodium chloride (NS) flush 5-10 mL  5-10 mL IntraVENous PRN    ondansetron (ZOFRAN) injection 4 mg  4 mg IntraVENous Q4H PRN    arformoterol (BROVANA) neb solution 15 mcg  15 mcg Nebulization BID RT    And    budesonide (PULMICORT) 500 mcg/2 ml nebulizer suspension  500 mcg Nebulization BID RT       Gali Noel MD

## 2018-04-08 NOTE — PROGRESS NOTES
Problem: Falls - Risk of  Goal: *Absence of Falls  Document Alex Fall Risk and appropriate interventions in the flowsheet.    Outcome: Progressing Towards Goal  Fall Risk Interventions:  Mobility Interventions: Communicate number of staff needed for ambulation/transfer, OT consult for ADLs, Patient to call before getting OOB, PT Consult for mobility concerns, PT Consult for assist device competence, Strengthening exercises (ROM-active/passive), Utilize walker, cane, or other assitive device, Utilize gait belt for transfers/ambulation    Mentation Interventions: Adequate sleep, hydration, pain control, Door open when patient unattended, Increase mobility    Medication Interventions: Bed/chair exit alarm, Evaluate medications/consider consulting pharmacy, Patient to call before getting OOB, Teach patient to arise slowly, Utilize gait belt for transfers/ambulation    Elimination Interventions: Call light in reach, Elevated toilet seat, Patient to call for help with toileting needs, Toilet paper/wipes in reach, Toileting schedule/hourly rounds

## 2018-04-08 NOTE — PROGRESS NOTES
Bedside shift change report given to Mauricio Martins RN (oncoming nurse) by Luke Driscoll RN (offgoing nurse). Report included the following information SBAR, MAR and Recent Results.

## 2018-04-09 LAB
ALBUMIN SERPL-MCNC: 1.3 G/DL (ref 3.5–5)
ALBUMIN/GLOB SERPL: 0.3 {RATIO} (ref 1.1–2.2)
ALP SERPL-CCNC: 92 U/L (ref 45–117)
ALT SERPL-CCNC: 11 U/L (ref 12–78)
ANION GAP SERPL CALC-SCNC: 6 MMOL/L (ref 5–15)
AST SERPL-CCNC: 16 U/L (ref 15–37)
BILIRUB SERPL-MCNC: 0.2 MG/DL (ref 0.2–1)
BUN SERPL-MCNC: 21 MG/DL (ref 6–20)
BUN/CREAT SERPL: 4 (ref 12–20)
CALCIUM SERPL-MCNC: 8.4 MG/DL (ref 8.5–10.1)
CHLORIDE SERPL-SCNC: 104 MMOL/L (ref 97–108)
CO2 SERPL-SCNC: 29 MMOL/L (ref 21–32)
CREAT SERPL-MCNC: 5.82 MG/DL (ref 0.55–1.02)
GLOBULIN SER CALC-MCNC: 4.8 G/DL (ref 2–4)
GLUCOSE BLD STRIP.AUTO-MCNC: 102 MG/DL (ref 65–100)
GLUCOSE BLD STRIP.AUTO-MCNC: 111 MG/DL (ref 65–100)
GLUCOSE BLD STRIP.AUTO-MCNC: 75 MG/DL (ref 65–100)
GLUCOSE BLD STRIP.AUTO-MCNC: 86 MG/DL (ref 65–100)
GLUCOSE SERPL-MCNC: 85 MG/DL (ref 65–100)
POTASSIUM SERPL-SCNC: 4.7 MMOL/L (ref 3.5–5.1)
PROT SERPL-MCNC: 6.1 G/DL (ref 6.4–8.2)
SERVICE CMNT-IMP: ABNORMAL
SERVICE CMNT-IMP: ABNORMAL
SERVICE CMNT-IMP: NORMAL
SERVICE CMNT-IMP: NORMAL
SODIUM SERPL-SCNC: 139 MMOL/L (ref 136–145)

## 2018-04-09 PROCEDURE — 37184 PRIM ART M-THRMBC 1ST VSL: CPT

## 2018-04-09 PROCEDURE — 77010033678 HC OXYGEN DAILY

## 2018-04-09 PROCEDURE — 82962 GLUCOSE BLOOD TEST: CPT

## 2018-04-09 PROCEDURE — C1769 GUIDE WIRE: HCPCS

## 2018-04-09 PROCEDURE — 05793ZZ DILATION OF RIGHT BRACHIAL VEIN, PERCUTANEOUS APPROACH: ICD-10-PCS | Performed by: SURGERY

## 2018-04-09 PROCEDURE — C1894 INTRO/SHEATH, NON-LASER: HCPCS

## 2018-04-09 PROCEDURE — 94664 DEMO&/EVAL PT USE INHALER: CPT

## 2018-04-09 PROCEDURE — 74011636637 HC RX REV CODE- 636/637: Performed by: HOSPITALIST

## 2018-04-09 PROCEDURE — 94640 AIRWAY INHALATION TREATMENT: CPT

## 2018-04-09 PROCEDURE — 74011636320 HC RX REV CODE- 636/320: Performed by: SURGERY

## 2018-04-09 PROCEDURE — 77030013797 HC KT TRNSDUC PRSSR EDWD -A

## 2018-04-09 PROCEDURE — 74011250637 HC RX REV CODE- 250/637: Performed by: INTERNAL MEDICINE

## 2018-04-09 PROCEDURE — 74011250637 HC RX REV CODE- 250/637: Performed by: NURSE PRACTITIONER

## 2018-04-09 PROCEDURE — 99153 MOD SED SAME PHYS/QHP EA: CPT

## 2018-04-09 PROCEDURE — 77030013744

## 2018-04-09 PROCEDURE — 99152 MOD SED SAME PHYS/QHP 5/>YRS: CPT

## 2018-04-09 PROCEDURE — B50M1ZZ PLAIN RADIOGRAPHY OF RIGHT UPPER EXTREMITY VEINS USING LOW OSMOLAR CONTRAST: ICD-10-PCS | Performed by: SURGERY

## 2018-04-09 PROCEDURE — 74011250636 HC RX REV CODE- 250/636: Performed by: INTERNAL MEDICINE

## 2018-04-09 PROCEDURE — 65270000029 HC RM PRIVATE

## 2018-04-09 PROCEDURE — 36415 COLL VENOUS BLD VENIPUNCTURE: CPT | Performed by: INTERNAL MEDICINE

## 2018-04-09 PROCEDURE — C1725 CATH, TRANSLUMIN NON-LASER: HCPCS

## 2018-04-09 PROCEDURE — 74011000250 HC RX REV CODE- 250: Performed by: SURGERY

## 2018-04-09 PROCEDURE — 74011000250 HC RX REV CODE- 250: Performed by: HOSPITALIST

## 2018-04-09 PROCEDURE — 80053 COMPREHEN METABOLIC PANEL: CPT | Performed by: INTERNAL MEDICINE

## 2018-04-09 PROCEDURE — 74011250636 HC RX REV CODE- 250/636: Performed by: SURGERY

## 2018-04-09 PROCEDURE — B30H1ZZ PLAIN RADIOGRAPHY OF RIGHT UPPER EXTREMITY ARTERIES USING LOW OSMOLAR CONTRAST: ICD-10-PCS | Performed by: SURGERY

## 2018-04-09 PROCEDURE — C1887 CATHETER, GUIDING: HCPCS

## 2018-04-09 PROCEDURE — 74011250637 HC RX REV CODE- 250/637: Performed by: HOSPITALIST

## 2018-04-09 RX ORDER — MIDAZOLAM HYDROCHLORIDE 1 MG/ML
1-10 INJECTION, SOLUTION INTRAMUSCULAR; INTRAVENOUS
Status: DISCONTINUED | OUTPATIENT
Start: 2018-04-09 | End: 2018-04-09

## 2018-04-09 RX ORDER — SODIUM CHLORIDE 0.9 % (FLUSH) 0.9 %
10 SYRINGE (ML) INJECTION AS NEEDED
Status: DISCONTINUED | OUTPATIENT
Start: 2018-04-09 | End: 2018-04-09 | Stop reason: HOSPADM

## 2018-04-09 RX ORDER — SODIUM CHLORIDE 9 MG/ML
3 INJECTION, SOLUTION INTRAVENOUS CONTINUOUS
Status: DISCONTINUED | OUTPATIENT
Start: 2018-04-09 | End: 2018-04-09

## 2018-04-09 RX ORDER — LIDOCAINE HYDROCHLORIDE 10 MG/ML
5-30 INJECTION INFILTRATION; PERINEURAL AS NEEDED
Status: DISCONTINUED | OUTPATIENT
Start: 2018-04-09 | End: 2018-04-09

## 2018-04-09 RX ORDER — HEPARIN SODIUM 1000 [USP'U]/ML
INJECTION, SOLUTION INTRAVENOUS; SUBCUTANEOUS
Status: DISCONTINUED
Start: 2018-04-09 | End: 2018-04-09

## 2018-04-09 RX ORDER — FACIAL-BODY WIPES
10 EACH TOPICAL
Status: DISPENSED | OUTPATIENT
Start: 2018-04-09 | End: 2018-04-09

## 2018-04-09 RX ORDER — HEPARIN SODIUM 1000 [USP'U]/ML
5000 INJECTION, SOLUTION INTRAVENOUS; SUBCUTANEOUS AS NEEDED
Status: DISCONTINUED | OUTPATIENT
Start: 2018-04-09 | End: 2018-04-09

## 2018-04-09 RX ORDER — HEPARIN SODIUM 200 [USP'U]/100ML
2000 INJECTION, SOLUTION INTRAVENOUS ONCE
Status: COMPLETED | OUTPATIENT
Start: 2018-04-09 | End: 2018-04-09

## 2018-04-09 RX ORDER — ATROPINE SULFATE 0.1 MG/ML
0.4 INJECTION INTRAVENOUS AS NEEDED
Status: DISCONTINUED | OUTPATIENT
Start: 2018-04-09 | End: 2018-04-09

## 2018-04-09 RX ORDER — SODIUM CHLORIDE 9 MG/ML
1.5 INJECTION, SOLUTION INTRAVENOUS CONTINUOUS
Status: DISCONTINUED | OUTPATIENT
Start: 2018-04-09 | End: 2018-04-09

## 2018-04-09 RX ORDER — FENTANYL CITRATE 50 UG/ML
25-200 INJECTION, SOLUTION INTRAMUSCULAR; INTRAVENOUS
Status: DISCONTINUED | OUTPATIENT
Start: 2018-04-09 | End: 2018-04-09

## 2018-04-09 RX ADMIN — HYDROXYCHLOROQUINE SULFATE 200 MG: 200 TABLET, FILM COATED ORAL at 09:57

## 2018-04-09 RX ADMIN — Medication 2500 MCG: at 13:59

## 2018-04-09 RX ADMIN — ARFORMOTEROL TARTRATE 15 MCG: 15 SOLUTION RESPIRATORY (INHALATION) at 07:14

## 2018-04-09 RX ADMIN — BUDESONIDE 500 MCG: 0.5 INHALANT RESPIRATORY (INHALATION) at 07:14

## 2018-04-09 RX ADMIN — HYDROMORPHONE HYDROCHLORIDE 0.5 MG: 2 INJECTION, SOLUTION INTRAMUSCULAR; INTRAVENOUS; SUBCUTANEOUS at 05:54

## 2018-04-09 RX ADMIN — FENTANYL CITRATE 25 MCG: 50 INJECTION, SOLUTION INTRAMUSCULAR; INTRAVENOUS at 11:09

## 2018-04-09 RX ADMIN — HYDROMORPHONE HYDROCHLORIDE 0.5 MG: 2 INJECTION, SOLUTION INTRAMUSCULAR; INTRAVENOUS; SUBCUTANEOUS at 01:47

## 2018-04-09 RX ADMIN — OXYCODONE HYDROCHLORIDE 10 MG: 5 TABLET ORAL at 20:09

## 2018-04-09 RX ADMIN — Medication 10 ML: at 01:47

## 2018-04-09 RX ADMIN — HYDROMORPHONE HYDROCHLORIDE 0.5 MG: 2 INJECTION, SOLUTION INTRAMUSCULAR; INTRAVENOUS; SUBCUTANEOUS at 14:52

## 2018-04-09 RX ADMIN — CASTOR OIL AND BALSAM, PERU: 788; 87 OINTMENT TOPICAL at 22:14

## 2018-04-09 RX ADMIN — FENTANYL CITRATE 25 MCG: 50 INJECTION, SOLUTION INTRAMUSCULAR; INTRAVENOUS at 11:13

## 2018-04-09 RX ADMIN — IOPAMIDOL 50 ML: 612 INJECTION, SOLUTION INTRAVENOUS at 11:43

## 2018-04-09 RX ADMIN — BUDESONIDE 500 MCG: 0.5 INHALANT RESPIRATORY (INHALATION) at 22:59

## 2018-04-09 RX ADMIN — GEMFIBROZIL 300 MG: 600 TABLET ORAL at 13:59

## 2018-04-09 RX ADMIN — AMITRIPTYLINE HYDROCHLORIDE 25 MG: 10 TABLET, FILM COATED ORAL at 22:12

## 2018-04-09 RX ADMIN — CASTOR OIL AND BALSAM, PERU: 788; 87 OINTMENT TOPICAL at 14:02

## 2018-04-09 RX ADMIN — MIDAZOLAM HYDROCHLORIDE 1 MG: 1 INJECTION, SOLUTION INTRAMUSCULAR; INTRAVENOUS at 11:09

## 2018-04-09 RX ADMIN — HYDROMORPHONE HYDROCHLORIDE 0.5 MG: 2 INJECTION, SOLUTION INTRAMUSCULAR; INTRAVENOUS; SUBCUTANEOUS at 22:53

## 2018-04-09 RX ADMIN — CARVEDILOL 12.5 MG: 12.5 TABLET, FILM COATED ORAL at 09:57

## 2018-04-09 RX ADMIN — ARFORMOTEROL TARTRATE 15 MCG: 15 SOLUTION RESPIRATORY (INHALATION) at 22:59

## 2018-04-09 RX ADMIN — OXYCODONE HYDROCHLORIDE 10 MG: 5 TABLET ORAL at 13:58

## 2018-04-09 RX ADMIN — Medication 10 ML: at 22:12

## 2018-04-09 RX ADMIN — PANTOPRAZOLE SODIUM 40 MG: 40 TABLET, DELAYED RELEASE ORAL at 06:09

## 2018-04-09 RX ADMIN — APIXABAN 5 MG: 5 TABLET, FILM COATED ORAL at 09:57

## 2018-04-09 RX ADMIN — PREDNISONE 5 MG: 5 TABLET ORAL at 09:57

## 2018-04-09 RX ADMIN — HYDROMORPHONE HYDROCHLORIDE 0.5 MG: 2 INJECTION, SOLUTION INTRAMUSCULAR; INTRAVENOUS; SUBCUTANEOUS at 09:50

## 2018-04-09 RX ADMIN — HYDROMORPHONE HYDROCHLORIDE 0.5 MG: 2 INJECTION, SOLUTION INTRAMUSCULAR; INTRAVENOUS; SUBCUTANEOUS at 19:04

## 2018-04-09 RX ADMIN — Medication 10 ML: at 22:53

## 2018-04-09 RX ADMIN — SENNOSIDES 17.2 MG: 8.6 TABLET, FILM COATED ORAL at 22:13

## 2018-04-09 RX ADMIN — HEPARIN SODIUM 2000 UNITS: 200 INJECTION, SOLUTION INTRAVENOUS at 11:00

## 2018-04-09 RX ADMIN — LIDOCAINE HYDROCHLORIDE 5 ML: 10 INJECTION, SOLUTION INFILTRATION; PERINEURAL at 11:10

## 2018-04-09 RX ADMIN — OXYCODONE HYDROCHLORIDE 10 MG: 5 TABLET ORAL at 03:22

## 2018-04-09 RX ADMIN — AMLODIPINE BESYLATE 5 MG: 5 TABLET ORAL at 13:58

## 2018-04-09 RX ADMIN — Medication 10 ML: at 05:54

## 2018-04-09 RX ADMIN — CASTOR OIL AND BALSAM, PERU: 788; 87 OINTMENT TOPICAL at 05:54

## 2018-04-09 RX ADMIN — HYDROXYCHLOROQUINE SULFATE 200 MG: 200 TABLET, FILM COATED ORAL at 16:57

## 2018-04-09 RX ADMIN — APIXABAN 5 MG: 5 TABLET, FILM COATED ORAL at 16:57

## 2018-04-09 RX ADMIN — CARVEDILOL 12.5 MG: 12.5 TABLET, FILM COATED ORAL at 16:57

## 2018-04-09 RX ADMIN — MIDAZOLAM HYDROCHLORIDE 1 MG: 1 INJECTION, SOLUTION INTRAMUSCULAR; INTRAVENOUS at 11:13

## 2018-04-09 RX ADMIN — POLYETHYLENE GLYCOL 3350 17 G: 17 POWDER, FOR SOLUTION ORAL at 13:59

## 2018-04-09 NOTE — PROGRESS NOTES
Hospitalist Progress Note            This is a 27-year-old female with multiple medical problems including SLE, end-stage renal disease (on hemodialysis, TTS), pulmonary embolism, on Eliquis (2012), hyperlipidemia, hypertension, recent Candida peritonitis, chronic bilateral pain.  She comes over here because of abdominal pain since last 4 or 5 days. Daily Progress Note: 4/9/2018    Assessment/Plan:   Acute peritonitis (e coli) with sepsis  - also with recent candida peritonitis  - CT abd 3/11 Large amount of free intraperitoneal fluid appears somewhat loculated  - Peritoneal fluid heavy E coli on 3/11 and 3/18  - Antibiotics per ID  - s/p paracentesis  - multiple CT scans showing interval changes, last on 3/30: Stable peritoneal fluid collection containing a percutaneous drain  - drain exchanged and upsized by IR on 4/5; monitor output. Once it stops will need re-imaging and if loculations continue she would need surgery. Gen surg following  - completed IV abx on 4/6/18  - palliative care consulted for pain management, recs appreciated       HTN  -continue current meds     Thrombocytopenia   -resolved      Bilateral LE edema  -likely third-spacing  -Dopplers negative for DVT      SLE  -continue home meds  -D/C'd Imuran, allopurinol by nephro with acute infection and thrombocytopenia.       History of PE  - on Eliquis since 2012  - per documentation from prior attending: \"appreciate discussion with PMD 3/13. The patient was seen by hematology in 2013 but seems was lost to follow up. I think at that time the plan was to stop anticoagulation if her SLE is stable. Spoke to Dr Irvin Galvan, he feels that the patient can come off Eliquis. His records do not suggest any recurrence of DVT/PE.  Spoke to Dr Mimi Hall, he has not seen the patient since >1 yr. If antiphospholipid antibodies negative then the patient can come off Eliquis\"  - 10/17 V/Q high probability for PE  - Per oncology the patient should be continued to Eliquis indefinitely      Hypophosphatemia:  -replete prn     ESRD  -on HD TTS  -RUE AVG malfunctioning, vasc surg consult by nephrology     Anemia  -of chronic disease  -Transfused 2 units PRBC with HD 3/15     Hepatic steatosis    Severe protein calorie malnutrition  -Consult nutrition    Constipation:  -bowel regimen      Full code       Subjective:   abd pain unchaged, no n/v/d, no dyspnea    Review of Systems:   As above    Objective:     Visit Vitals    /81 (BP 1 Location: Left arm, BP Patient Position: At rest)    Pulse 88    Temp 98.3 °F (36.8 °C)    Resp 16    Ht 5' 5\" (1.651 m)    Wt 72.5 kg (159 lb 14.4 oz)    SpO2 94%    BMI 26.61 kg/m2    O2 Flow Rate (L/min): 2 l/min O2 Device: Room air    Temp (24hrs), Av.3 °F (36.8 °C), Min:97.8 °F (36.6 °C), Max:98.7 °F (37.1 °C)            1901 -  0700  In: -   Out: 226 [Drains:225]    EXAM:  General: Chronically-ill appearing, NAD    HEENT: Anicteric sclerae, MMM  Neck:   Supple, trachea midline  Lungs:  CTA bilaterally. No Wheezing/Rhonchi/Rales, normal work of breathing. Heart:  Regular rhythm,  No murmur (), No Rubs, No Gallops  Abdomen: Distended. Mild diffuse tenderness unchanged.  Paracentesis drain in place draining brown-red opaque fluid, bs+  Extremities: Warm, ++peripheral edema  Neurologic:  CN 2-12 gi, normal speech, moves all extremities   Psych:   Not anxious nor agitated        Data Review:     Recent Results (from the past 24 hour(s))   GLUCOSE, POC    Collection Time: 18  2:21 PM   Result Value Ref Range    Glucose (POC) 125 (H) 65 - 100 mg/dL    Performed by [address] [place] [address], POC    Collection Time: 18  9:37 PM   Result Value Ref Range    Glucose (POC) 94 65 - 100 mg/dL    Performed by Edwin Prado (PCT)    GLUCOSE, POC    Collection Time: 18  6:01 AM   Result Value Ref Range    Glucose (POC) 102 (H) 65 - 100 mg/dL    Performed by Shelby Zee        Principal Problem: Peritonitis ([address] (3/11/2018)    Active Problems:    Generalized abdominal pain (4/4/2018)      Other constipation (4/4/2018)      Other ascites (4/4/2018)        Medications reviewed  Current Facility-Administered Medications   Medication Dose Route Frequency    bisacodyl (DULCOLAX) suppository 10 mg  10 mg Rectal NOW    HYDROmorphone (PF) (DILAUDID) injection 0.5 mg  0.5 mg IntraVENous Q4H PRN    oxyCODONE IR (ROXICODONE) tablet 10 mg  10 mg Oral Q6H PRN    polyethylene glycol (MIRALAX) packet 17 g  17 g Oral DAILY    senna (SENOKOT) tablet 17.2 mg  2 Tab Oral QHS    balsam peru-castor oil (VENELEX)  mg/gram ointment   Topical Q8H    amLODIPine (NORVASC) tablet 5 mg  5 mg Oral DAILY    carvedilol (COREG) tablet 12.5 mg  12.5 mg Oral BID WITH MEALS    dextrose 5% and 0.9% NaCl infusion  15 mL/hr IntraVENous CONTINUOUS    hydrALAZINE (APRESOLINE) 20 mg/mL injection 5 mg  5 mg IntraVENous Q6H PRN    apixaban (ELIQUIS) tablet 5 mg  5 mg Oral BID    0.9% sodium chloride infusion 250 mL  250 mL IntraVENous PRN    glucose chewable tablet 16 g  4 Tab Oral PRN    dextrose (D50W) injection syrg 12.5-25 g  12.5-25 g IntraVENous PRN    glucagon (GLUCAGEN) injection 1 mg  1 mg IntraMUSCular PRN    epoetin pai (EPOGEN;PROCRIT) 14,000 Units  14,000 Units SubCUTAneous DIALYSIS TUE, THU & SAT    predniSONE (DELTASONE) tablet 5 mg  5 mg Oral DAILY    pantoprazole (PROTONIX) tablet 40 mg  40 mg Oral ACB    cyanocobalamin (VITAMIN B12) tablet 2,500 mcg  2,500 mcg Oral DAILY    hydroxychloroquine (PLAQUENIL) tablet 200 mg  200 mg Oral BID    albuterol (PROVENTIL VENTOLIN) nebulizer solution 2.5 mg  2.5 mg Nebulization Q4H PRN    gemfibrozil (LOPID) tablet 300 mg  300 mg Oral DAILY    amitriptyline (ELAVIL) tablet 25 mg  25 mg Oral QHS    sodium chloride (NS) flush 5-10 mL  5-10 mL IntraVENous Q8H    sodium chloride (NS) flush 5-10 mL  5-10 mL IntraVENous PRN    ondansetron (ZOFRAN) injection 4 mg  4 mg IntraVENous Q4H PRN    arformoterol (BROVANA) neb solution 15 mcg  15 mcg Nebulization BID RT    And    budesonide (PULMICORT) 500 mcg/2 ml nebulizer suspension  500 mcg Nebulization BID RT       Yanet Guajardo MD

## 2018-04-09 NOTE — PROGRESS NOTES
TRANSFER - OUT REPORT:    Verbal report given to SimoneRN(name) on Garrison Urena  being transferred to (unit) for routine progression of care       Report consisted of patients Situation, Background, Assessment and   Recommendations(SBAR). Information from the following report(s) Procedure Summary, Intake/Output, MAR and Recent Results was reviewed with the receiving nurse. Lines:   Peripheral IV 04/05/18 Left Forearm (Active)   Site Assessment Clean, dry, & intact 4/8/2018  9:40 PM   Phlebitis Assessment 0 4/8/2018  9:40 PM   Infiltration Assessment 0 4/8/2018  9:40 PM   Dressing Status Clean, dry, & intact 4/8/2018  9:40 PM   Dressing Type Transparent 4/8/2018  9:40 PM   Hub Color/Line Status Infusing;Flushed 4/8/2018  9:40 PM   Action Taken Open ports on tubing capped 4/8/2018  9:40 PM   Alcohol Cap Used Yes 4/8/2018  9:40 PM        Opportunity for questions and clarification was provided.       Patient transported with:  Patient transport

## 2018-04-09 NOTE — PROGRESS NOTES
Progress Note    Patient: Jose Ruiz MRN: [id number]  SSN: [id number]    YOB: 1985  Age: 29 y.o. Sex: female      Admit Date: 3/11/2018    Intra-abdominal fluid collection    Subjective:     No acute surgical issues. Pt still reporting abdominal pain and nausea with vomiting when eating. No fever or leukocytosis    Objective:     Visit Vitals    BP (!) 137/92 (BP 1 Location: Left arm, BP Patient Position: At rest)    Pulse 91    Temp 97.8 °F (36.6 °C)    Resp 16    Ht 5' 5\" (1.651 m)    Wt 150 lb 14.4 oz (68.4 kg)    SpO2 96%    BMI 25.11 kg/m2       Temp (24hrs), Av °F (36.7 °C), Min:97.7 °F (36.5 °C), Max:98.7 °F (37.1 °C)        Physical Exam:    Gen:  NAD  Pulm:  Unlabored  Abd:  S/ND/mild TTP in LLQ  Wound:  C/D/I    Recent Results (from the past 24 hour(s))   GLUCOSE, POC    Collection Time: 18  9:24 PM   Result Value Ref Range    Glucose (POC) 110 (H) 65 - 100 mg/dL    Performed by Josette Ribera    GLUCOSE, POC    Collection Time: 18  7:39 AM   Result Value Ref Range    Glucose (POC) 99 65 - 100 mg/dL    Performed by Landen Penaloza    GLUCOSE, POC    Collection Time: 18  2:21 PM   Result Value Ref Range    Glucose (POC) 125 (H) 65 - 100 mg/dL    Performed by Alejandra Elizondo          Assessment:     Hospital Problems  Date Reviewed: 3/11/2018          Codes Class Noted POA    Generalized abdominal pain ICD-10-CM: R10.84  ICD-9-CM: 789.07  2018 Unknown        Other constipation ICD-10-CM: K59.09  ICD-9-CM: 564.09  2018 Unknown        Other ascites ICD-10-CM: R18.8  ICD-9-CM: 789.59  2018 Unknown        * (Principal)[facility] ([facility] ICD-10-CM: K65.9  ICD-9-CM: 567.9  3/11/2018 Yes              Plan/Recommendations/Medical Decision Making:     - Abdominal fluid collection: No acute indication for operation  - Will need repeat non-contrast CT to evaluate for resolution of fluid collection since catheter has been upsided.   - Possible OR Wednesday if fluid collection is not resolving on CT scan    Signed By: Margaret Barrera MD     April 8, 2018

## 2018-04-09 NOTE — PROGRESS NOTES
Patient name: Marjorie Arango  MRN: [id number]    Nephrology Progress note:    Assessment:    ESRD: on HD TTS - [facility]     AVG - poor function    E. Coli Bacteremia: source intra-abd source/ E. Coli peritonitis. IV zosyn. Repeat Bld Cx 3/11-> Neg. Recent hx of Candida peritonitis. 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: Hgb remains below goal - on EPO    SHPT: Hypophosphatemia-> give Neutra-Phos    Protein malnutrition    Hypoglycemia: wean D5NS fluids as tolerated    Edema: 3rd spacing-> increase UF on HD    Plan:    S/p only 30 minutes of HD   b/c poorly functioning AVG  AVG study today  labs  Subjective:  Going to labs for AVG study       Exam:  Visit Vitals    /81 (BP 1 Location: Left arm, BP Patient Position: At rest)    Pulse 88    Temp 98.3 °F (36.8 °C)    Resp 16    Ht 5' 5\" (1.651 m)    Wt 72.5 kg (159 lb 14.4 oz)    SpO2 94%    BMI 26.61 kg/m2     Wt Readings from Last 3 Encounters:   04/09/18 72.5 kg (159 lb 14.4 oz)   03/05/18 68 kg (150 lb)   01/29/18 69.4 kg (153 lb)       Intake/Output Summary (Last 24 hours) at 04/09/18 1105  Last data filed at 04/09/18 0610   Gross per 24 hour   Intake                0 ml   Output              125 ml   Net             -125 ml       NAD  CTAB/l  RUE/RLE edema  Abd drain, mild TTP      Labs/Data:    Lab Results   Component Value Date/Time    WBC 4.2 04/05/2018 04:09 AM    Hemoglobin (POC) 7.8 (L) 01/19/2018 12:30 PM    HGB 8.1 (L) 04/05/2018 04:09 AM    Hematocrit (POC) 23 (L) 01/19/2018 12:30 PM    HCT 27.3 (L) 04/05/2018 04:09 AM    PLATELET 960 98/48/9208 04:09 AM    MCV 88.3 04/05/2018 04:09 AM       Lab Results   Component Value Date/Time    Sodium 142 04/06/2018 02:46 AM    Potassium 4.1 04/06/2018 02:46 AM    Chloride 105 04/06/2018 02:46 AM    CO2 30 04/06/2018 02:46 AM    Anion gap 7 04/06/2018 02:46 AM    Glucose 80 04/06/2018 02:46 AM    BUN 9 04/06/2018 02:46 AM    Creatinine 3.32 (H) 04/06/2018 02:46 AM    BUN/Creatinine ratio 3 (L) 04/06/2018 02:46 AM    GFR est AA 20 (L) 04/06/2018 02:46 AM    GFR est non-AA 16 (L) 04/06/2018 02:46 AM    Calcium 7.8 (L) 04/06/2018 02:46 AM        Discussed with patient

## 2018-04-09 NOTE — PROGRESS NOTES
Deb Hearn  is a 29 y. o.female  with a history of SLE, lupus nephritis leading to ESRD //HTN/ treated for candida peritonitis in Dec 2017  Is admitted with abdominal pain and fever since last Thursday  Pt was in [facility] in Dec 2017 for candida peritonitis and PD was stopped and she was started on HD thru a catheter. She took 4 weeks of fluconazole and was doing fine She had a graft placed on the rt arm on 1/19/19 - She developed fever and abdominal pain on 3/6 and blood culture sent from the catheter at dialysis center- The catheter was removed on 3/7 and the graft was accessed last week-   She was given IV gent  at the dialysis center.  As the abdominal pain was getting worse she came to the ED on 3/11 and was admitted  CT abdomen showed Large amount of free intraperitoneal fluid that is  loculated.         Multiple hospitalizations in the past few months  OCt 14-17 /2017 for left lower lobe pneumonia/effusion- was found to have PE on the rt side  10/27-10/30 possible pneumonia- same infiltrate left lower lobe- sent on augmentin     11/26-/11/28 for b/l leg swelling and abdominal swelling     12/12/17-12/22/17- fungal peritonitis- treated with fluconazole for 4 weeks     She had  peritoneal dialysis since 2015- had cath placed in 2015 until it was removed in Dec 2017      subjective  Still c/o abdominal pain  Objective:   VITALS:    Patient Vitals for the past 12 hrs:   Temp Pulse Resp BP SpO2   04/09/18 1355 98.6 °F (37 °C) 81 16 (!) 135/99 98 %   04/09/18 1331 - 80 16 122/86 97 %   04/09/18 1301 - 80 16 124/85 96 %   04/09/18 1246 - 81 17 122/88 96 %   04/09/18 1231 - 81 16 119/88 96 %   04/09/18 1216 - 82 17 127/88 97 %   04/09/18 1200 - 82 16 127/90 98 %   04/09/18 1154 - 82 17 (!) 124/93 97 %   04/09/18 1142 - 83 16 116/83 100 %   04/09/18 0933 98.3 °F (36.8 °C) 88 16 109/81 94 %   04/09/18 0714 - - - - 98 %   PHYSICAL EXAM:   General:                   no distress  Lungs:                       B/l air entry    [place]:                                  s1s2  Abdomen:                  Drain - reddish fluid  Tender lower abdomen  Extremities:               Rt arm graft   Arm swollen  Rt breast edematous  Lymph:                      Cervical, supraclavicular normal.  Neurologic:                Grossly non-focal     Pertinent Labs  Wbc3.6   Hb 7.9      Peritoneal fluid- 3524 WBC ( 85% N)  E.coli 3/18  3/28 fluid culture Ng so far     IMAGING RESULTS:             Impression/Recommendation    33 yr female with SLE/ lupus nephritis/HTN -h/o peritoneal dialysis until Dec 2017 /Candida peritonitis in Dec 2017/ treated with 4 weeks of fluconazole     Admitted with abdominal pain and fever of  5 days     E.coli  peritonitis with e.coli bacteremia  Fluid drained by IR placed drain which was replaced on 4/7/18    received 4 weeks of antibiotic- DC on  4/6/18   repeat   culture on 3/28 is neg and on 4/7/18  Negative as well    Edema rt side of the body- has high grade occlusion of brachial vein- vascular on case   Planning for surgical revision of the graft  SLE- on plaquenil and low dose prednisone-   There is little else that I can offer at this point in time. I will sign off this patient's case. Please call back PRN.

## 2018-04-09 NOTE — PROGRESS NOTES
Patient discussed today in IDR --patient is not ready for discharge. OR today. Twyla Edwards She will be evaluated by therapy and recommendations made. Patient receives hemodialysis 3x week  (TTS). Will likely require rehab prior to returning home.

## 2018-04-09 NOTE — PROGRESS NOTES
General Surgery at [facility]    Pt's complaint is current pain meds not relieving pain because interval was changed from q3h to q4h. Relayed to RN. She had graft angiography today. Vasc surg plans for revision on Wednesday. PE: drain fluid is light serosanguinous, output 125 today, 225 yesterday. Dr. Lashaun Ferraro had hoped to repeat CT tomorrow with plans to drain surgically if any residual fluid. Will repeat CT tomorrow. Dr. Lashaun Ferraro will have to coordinate with vasc surg regarding timing of potential procedures.

## 2018-04-09 NOTE — PROGRESS NOTES
Vascular Surgery  --d/w Dr. Herminia Driscoll  --unable to get time tomorrow  --have added on for Wed am for graft revision (should be able to use it following procedure)

## 2018-04-09 NOTE — PROGRESS NOTES
Physical Therapy  Chart reviewed, pt is S/P Right arteriovenous graft angiogram, Right venous angioplasty of AV graft anastomosis today, will defer therapy today and follow up tomorrow to resume services  Bertha Lao PT

## 2018-04-09 NOTE — OP NOTES
[facility] OP NOTE    Tennille Esquivel  MR#: [id number]  : 1985  ACCOUNT #: [de-identified]   DATE OF SERVICE: 2018    SURGEON:  Earline Dickinson MD     ASSISTANT: None    PREOPERATIVE DIAGNOSIS:  Malfunctioning right upper extremity graft. POSTOPERATIVE DIAGNOSIS:  Malfunctioning right upper extremity graft. PROCEDURE PERFORMED:   1. Right arteriovenous graft angiogram.  2.  Right venous angioplasty of AV graft anastomosis. ANESTHESIA:  MAC with local.    ESTIMATED BLOOD LOSS:  5 mL. COMPLICATIONS:  None. OPERATIVE IMPLANTS:  None. SPECIMENS REMOVED:  None    OPERATIVE INDICATIONS:  The patient is a 27-year-old female admitted for abdominal pain and a peritoneal fluid collection. She underwent a right upper extremity loop graft by my partner, Dr. Arlette Bryan, approximately 3 months ago. The graft had been working. However, on this admission, the flows have been low. I, therefore, was recommended to proceed with angiogram of the graft to evaluate any stenotic areas. Prior to the procedure, the nature of the procedure as well as risks and benefits were explained to the patient in detail. She elected to proceed. OPERATIVE DETAILS:  The patient was identified in the preop holding area. Her right arm was marked and consents were signed. The patient was brought back to the cath lab where the right arm was then prepped and draped in the usual standard fashion. A timeout was then performed. Utilizing an access needle, I accessed the upper arm graft at its loop. The arterial side was known to be lateral.  I then had my needle placing downward and used the access 0.035 wire to cannulate the graft. I then advanced a 6-Lithuanian sheath and performed angiogram of the graft from this level. Angiogram showed the graft was widely patent. The flow was to one of the paired brachial veins.   As it joined the brachial vein going back towards the heart, there was a focal occlusion. The brachial vein did have retrograde perfusion filling the opposite brachial vein. So, essentially the graft was stayed open through collateral circulation. The brachial vein in the graft was anastomosed to did reconstitute essentially showing a very focal occlusion of the toe of the graft. Further shots of the central veins showed the axillary vein was otherwise widely patent and normal.  The right subclavian and superior vena cava were also widely patent. Next, I brought in the field a Glidewire and a glide catheter and I attempted to cross this occlusion. I did get in another channel that connected me to the other brachial vein and I performed angioplasty with a 6 mm balloon. This was of the venous anastomosis. There was significant waste at this level and when I removed this, I performed another angiogram and I realized my wire had not crossed the occlusion, rather it was in the collateral.  Therefore, there was no significant change with my angioplasty. So, there was still a high-grade occlusion. Multiple other attempts with different wires and catheters were used to cross the occlusion. This was unsuccessful. I, therefore, elected to abort the procedure and she will need surgical revision of the venous anastomosis of her upper arm graft. At the completion of the procedure, all needle, sponge, and instrument counts were correct x2. The patient tolerated the procedure well, awakened from anesthesia, and brought back to the cath lab holding area.       MD BELIA Mchugh /   D: 04/09/2018 14:00     T: 04/09/2018 14:49  JOB #: [id number]

## 2018-04-09 NOTE — PROGRESS NOTES
Bedside shift change report given to Delores Sampson RN (oncoming nurse) by Barron Lynch RN (offgoing nurse). Report included the following information SBAR, MAR and Recent Results.

## 2018-04-09 NOTE — PROGRESS NOTES
1050 -   Cardiac Cath Lab Procedure Area Arrival Note:    Michael Hummel arrived to Cardiac Cath Lab, Procedure Area. Patient identifiers verified with NAME and DATE OF BIRTH. Procedure verified with patient. Consent forms verified. Allergies verified. Patient informed of procedure and plan of care. Questions answered with review. Patient voiced understanding of procedure and plan of care. Patient on cardiac monitor, non-invasive blood pressure, SPO2 monitor. Placed on O2 @ 2 lpm via NC.  IV of NS on pump at [facility] ml/hr. Patient status doing well without problems. Patient is A&Ox 4. Patient reports 6/10 abdominal pain at this time. Patient medicated during procedure with orders obtained and verified by Dr. Derek Zayas. Refer to patients Cardiac Cath Lab PROCEDURE REPORT for vital signs, assessment, status, and response during procedure, printed at end of case. Printed report on chart or scanned into chart. 1148 - TRANSFER - OUT REPORT:    Verbal report given to HIGH MOBILITY) on Michael Hummel  being transferred to (unit) for routine post - op       Report consisted of patients Situation, Background, Assessment and   Recommendations(SBAR). Information from the following report(s) Procedure Summary and MAR was reviewed with the receiving nurse. Lines:   Peripheral IV 04/05/18 Left Forearm (Active)   Site Assessment Clean, dry, & intact 4/8/2018  9:40 PM   Phlebitis Assessment 0 4/8/2018  9:40 PM   Infiltration Assessment 0 4/8/2018  9:40 PM   Dressing Status Clean, dry, & intact 4/8/2018  9:40 PM   Dressing Type Transparent 4/8/2018  9:40 PM   Hub Color/Line Status Infusing;Flushed 4/8/2018  9:40 PM   Action Taken Open ports on tubing capped 4/8/2018  9:40 PM   Alcohol Cap Used Yes 4/8/2018  9:40 PM        Opportunity for questions and clarification was provided.       Patient transported with:   [employer]

## 2018-04-09 NOTE — PROGRESS NOTES
Palliative Medicine Consult  [place]: [phone])    Patient Name: Michael Hummel  YOB: 1985    Date of Initial Consult: 4/4/18  Reason for Consult: Care decisions  Requesting Provider: Dr. Raul Valenzuela  Primary Care Physician: Kennis Cogan, KHANH     SUMMARY:   Michael Hummel is a 29 y.o. with a past history of SLE, end-stage renal disease (on hemodialysis, TTS), pulmonary embolism, on Eliquis (2012), hyperlipidemia, hypertension, recent Candida peritonitis, chronic bilateral pain, who was admitted on 3/11/2018 from home with a diagnosis of increasing abdominal pain. Current medical issues leading to Palliative Medicine involvement include: complicated hospital course with E'coli peritonitis, loculated ascites s/p drain placement, being followed by general surgery. We are asked to help with pain management. PALLIATIVE DIAGNOSES:   1. Generalized abdominal pain  2. Constipation  3. debility       PLAN:   1. Abdominal pain- lower abdominal pain, sharp, colicky. Pain present only since admission. Responds best to iv dilaudid. On average, pt taking 6 doses. Plan was to dc that today but given pending surgical intervention and intermittent vomiting, will leave current regimen. Dosing interval of dilaudid was reduced last encounter    2. Constipation- complains of irregular bowel movements. On senna 2/hs,  Miralax added. Will order suppository. Constipation can certainly worsen her pain.       3. Communicated plan of care with: Palliative IDT       GOALS OF CARE / TREATMENT PREFERENCES:     GOALS OF CARE:  Patient/Health Care Proxy Stated Goals: Cure      TREATMENT PREFERENCES:   Code Status: Full Code    Advance Care Planning:  Advance Care Planning 4/4/2018   Patient's Healthcare Decision Maker is: Legal Next of Maria Antonia 70   Primary Decision Maker Name Vanessa Christie   Primary Decision Maker Phone Number [phone]   Primary Decision Maker Relationship to Patient Parent   Confirm Advance Directive -   Patient Would Like to Complete Advance Directive -           Other Instructions: Other:    As far as possible, the palliative care team has discussed with patient / health care proxy about goals of care / treatment preferences for patient. HISTORY:         HPI/SUBJECTIVE:    The patient is:   [x] Verbal and participatory  [] Non-participatory due to:     Pleasant young woman, her aunt and cousin are in the room providing her company. She just finished working with PT, did well. Abdominal pain- no prior pain history. Has colicky abdominal pain that is relieved with IV dilaudid. Pain lasts for a few minutes only. Very intense when it comes on like gas pains. 4/9/18: pt verbalized that she is still having significant pain. Took oxycodone at 3am without relief. Noted tentative plans for abdominal surgery in 2 days if s/s do not improve.   Relief mainly with iv dilaudid    Clinical Pain Assessment (nonverbal scale for severity on nonverbal patients):   Clinical Pain Assessment  Severity: 3  Location: abdomen  Character: sharp, colicky  Duration: weeks  Effect: functional  Factors: none  Frequency: on and off, intermittent          Duration: for how long has pt been experiencing pain (e.g., 2 days, 1 month, years)  Frequency: how often pain is an issue (e.g., several times per day, once every few days, constant)     FUNCTIONAL ASSESSMENT:     Palliative Performance Scale (PPS):  PPS: 80       PSYCHOSOCIAL/SPIRITUAL SCREENING:     Palliative IDT has assessed this patient for cultural preferences / practices and a referral made as appropriate to needs (Cultural Services, Patient Advocacy, Ethics, etc.)    Advance Care Planning:  Advance Care Planning 4/4/2018   Patient's Healthcare Decision Maker is: Legal Next of Maria Antonia 70   Primary Decision Maker Name Sarah Barba   Primary Decision Maker Phone Number [phone]   Primary Decision Maker Relationship to Patient Parent   Confirm Advance Directive -   Patient Would Like to Complete Advance Directive -       Any spiritual / Baptism concerns:  [] Yes /  [x] No    Caregiver Burnout:  [] Yes /  [x] No /  [] No Caregiver Present      Anticipatory grief assessment:   [x] Normal  / [] Maladaptive       ESAS Anxiety: Anxiety: 0    ESAS Depression: Depression: 0        REVIEW OF SYSTEMS:     Positive and pertinent negative findings in ROS are noted above in HPI. The following systems were [] reviewed / [] unable to be reviewed as noted in HPI  Other findings are noted below. Systems: constitutional, ears/nose/mouth/throat, respiratory, gastrointestinal, genitourinary, musculoskeletal, integumentary, neurologic, psychiatric, endocrine. Positive findings noted below. Modified ESAS Completed by: provider   Fatigue: 0 Drowsiness: 0   Depression: 0 Pain: 3   Anxiety: 0 Nausea: 0   Anorexia: 1 Dyspnea: 0     Constipation: Yes     Stool Occurrence(s): 1        PHYSICAL EXAM:     From RN flowsheet:  Wt Readings from Last 3 Encounters:   04/09/18 159 lb 14.4 oz (72.5 kg)   03/05/18 150 lb (68 kg)   01/29/18 153 lb (69.4 kg)     Blood pressure 109/81, pulse 88, temperature 98.3 °F (36.8 °C), resp. rate 16, height 5' 5\" (1.651 m), weight 159 lb 14.4 oz (72.5 kg), SpO2 94 %, unknown if currently breastfeeding.     Pain Scale 1: Numeric (0 - 10)  Pain Intensity 1: 4  Pain Onset 1: acute  Pain Location 1: Abdomen  Pain Orientation 1: Lower  Pain Description 1: Aching  Pain Intervention(s) 1: Medication (see MAR)  Last bowel movement, if known:     Constitutional: alert, oriented  Eyes: pupils equal, anicteric  ENMT: no nasal discharge, moist mucous membranes  Cardiovascular:   Respiratory: breathing not labored, symmetric  Gastrointestinal: soft, neg distention, pain is across lower abdomen  Musculoskeletal: no deformity, no tenderness to palpation  Skin: hair very thin on scalp, ?tinea capitus  Neurologic: following commands, moving all extremities  Psychiatric: full affect, no hallucinations  Other:       HISTORY:     Principal Problem:    Peritonitis ([address] (3/11/2018)    Active Problems:    Generalized abdominal pain (2018)      Other constipation (2018)      Other ascites (2018)      Past Medical History:   Diagnosis Date    Anemia     secondary to lupus    Asthma     no inhaler use in past 2 to 3 years    Carditis     Chronic kidney disease     ESRD    Chronic pain     DDD (degenerative disc disease), lumbar     ESRD (end stage renal disease) ([address]     GERD (gastroesophageal reflux disease)     Heart failure ([address]     Hemodialysis patient ([address] 2017    [address]  Tuesday,  Thursday,  and Saturday. 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  Poor historian 2018    With medications    Thromboembolus ([address] 2013    lungs    Transfusion history     Last Transfusion 2017  at [facility]      Past Surgical History:   Procedure Laterality Date    HX  SECTION  11/2006    x1    HX OTHER SURGICAL  9/16/15    INSERTION PD CATH; Removed 2017    HX VASCULAR ACCESS Right 2017    Double-Lumen henry catheter upper chest      Family History   Problem Relation Age of Onset    Diabetes Father     Hypertension Father     Cancer Other      aunt with breast cancer    Diabetes Mother       History reviewed, no pertinent family history.   Social History   Substance Use Topics    Smoking status: Never Smoker    Smokeless tobacco: Never Used    Alcohol use No     Allergies   Allergen Reactions    Compazine [Prochlorperazine Edisylate] Nausea and Vomiting and Palpitations      Current Facility-Administered Medications   Medication Dose Route Frequency    HYDROmorphone (PF) (DILAUDID) injection 0.5 mg  0.5 mg IntraVENous Q4H PRN    oxyCODONE IR (ROXICODONE) tablet 10 mg  10 mg Oral Q6H PRN    polyethylene glycol (MIRALAX) packet 17 g  17 g Oral DAILY    senna (SENOKOT) tablet 17.2 mg  2 Tab Oral QHS    balsam peru-castor oil (VENELEX)  mg/gram ointment   Topical Q8H    amLODIPine (NORVASC) tablet 5 mg  5 mg Oral DAILY    carvedilol (COREG) tablet 12.5 mg  12.5 mg Oral BID WITH MEALS    dextrose 5% and 0.9% NaCl infusion  15 mL/hr IntraVENous CONTINUOUS    hydrALAZINE (APRESOLINE) 20 mg/mL injection 5 mg  5 mg IntraVENous Q6H PRN    apixaban (ELIQUIS) tablet 5 mg  5 mg Oral BID    0.9% sodium chloride infusion 250 mL  250 mL IntraVENous PRN    glucose chewable tablet 16 g  4 Tab Oral PRN    dextrose (D50W) injection syrg 12.5-25 g  12.5-25 g IntraVENous PRN    glucagon (GLUCAGEN) injection 1 mg  1 mg IntraMUSCular PRN    epoetin pia (EPOGEN;PROCRIT) 14,000 Units  14,000 Units SubCUTAneous DIALYSIS TUE, THU & SAT    predniSONE (DELTASONE) tablet 5 mg  5 mg Oral DAILY    pantoprazole (PROTONIX) tablet 40 mg  40 mg Oral ACB    cyanocobalamin (VITAMIN B12) tablet 2,500 mcg  2,500 mcg Oral DAILY    hydroxychloroquine (PLAQUENIL) tablet 200 mg  200 mg Oral BID    albuterol (PROVENTIL VENTOLIN) nebulizer solution 2.5 mg  2.5 mg Nebulization Q4H PRN    gemfibrozil (LOPID) tablet 300 mg  300 mg Oral DAILY    amitriptyline (ELAVIL) tablet 25 mg  25 mg Oral QHS    sodium chloride (NS) flush 5-10 mL  5-10 mL IntraVENous Q8H    sodium chloride (NS) flush 5-10 mL  5-10 mL IntraVENous PRN    ondansetron (ZOFRAN) injection 4 mg  4 mg IntraVENous Q4H PRN    arformoterol (BROVANA) neb solution 15 mcg  15 mcg Nebulization BID RT    And    budesonide (PULMICORT) 500 mcg/2 ml nebulizer suspension  500 mcg Nebulization BID RT          LAB AND IMAGING FINDINGS: Lab Results   Component Value Date/Time    WBC 4.2 04/05/2018 04:09 AM    HGB 8.1 (L) 04/05/2018 04:09 AM    PLATELET 889 45/66/9248 04:09 AM     Lab Results   Component Value Date/Time    Sodium 142 04/06/2018 02:46 AM    Potassium 4.1 04/06/2018 02:46 AM    Chloride 105 04/06/2018 02:46 AM    CO2 30 04/06/2018 02:46 AM    BUN 9 04/06/2018 02:46 AM    Creatinine 3.32 (H) 04/06/2018 02:46 AM    Calcium 7.8 (L) 04/06/2018 02:46 AM    Magnesium 2.0 04/05/2018 04:09 AM    Phosphorus 1.7 (L) 04/06/2018 02:46 AM      Lab Results   Component Value Date/Time    AST (SGOT) 14 (L) 04/04/2018 07:40 AM    Alk. phosphatase 97 04/04/2018 07:40 AM    Protein, total 5.4 (L) 04/04/2018 07:40 AM    Albumin 1.0 (L) 04/04/2018 07:40 AM    Globulin 4.4 (H) 04/04/2018 07:40 AM     Lab Results   Component Value Date/Time    INR 1.3 (H) 03/25/2018 04:34 AM    Prothrombin time 13.5 (H) 03/25/2018 04:34 AM    aPTT 31.3 03/25/2018 04:34 AM      Lab Results   Component Value Date/Time    Iron 22 (L) 03/15/2018 10:32 AM    TIBC 52 (L) 03/15/2018 10:32 AM    Iron % saturation 42 03/15/2018 10:32 AM    Ferritin 548 (H) 03/15/2018 10:32 AM      No results found for: PH, PCO2, PO2  No components found for: [place]   Lab Results   Component Value Date/Time    CK 20 (L) 04/25/2013 05:20 PM    CK - MB <0.5 (L) 04/25/2013 05:20 PM                Total time: 35 minutes  Counseling / coordination time, spent as noted above:  20m  > 50% counseling / coordination?: y    Prolonged service was provided for  []30 min   []75 min in face to face time in the presence of the patient, spent as noted above. Time Start:   Time End:   Note: this can only be billed with 73066 (initial) or 15697 (follow up). If multiple start / stop times, list each separately.

## 2018-04-09 NOTE — PROGRESS NOTES
1154:  TRANSFER - IN REPORT:    Verbal report received from Vicki Ross on [facility] , from the Cardiac Cath lab, for routine progression of care. Report consisted of patients Situation, Background, Assessment and Recommendations(SBAR). Information from the following report(s) Procedure Summary, Intake/Output, MAR and Recent Results was reviewed with the receiving clinician. Opportunity for questions and clarification was provided. Assessment completed upon patients arrival to [address] [place] [address] and care assumed. Cardiac Cath Lab Recovery Arrival Note:     Angella Jimenez arrived to [facility] recovery area. Patient procedure= Fistulagram. Patient on cardiac monitor, non-invasive blood pressure, Patient status doing well without problems. Patient is A&Ox 4. Patient reports abdominal pain. Procedure site without any bleeding and no hematoma.

## 2018-04-09 NOTE — BRIEF OP NOTE
BRIEF OPERATIVE NOTE    Date of Procedure: 4/9/18  Preoperative Diagnosis: Poorly functioning graft  Postoperative Diagnosis: Same  Procedure: Right upper graft angiogram, angioplasty   Surgical Staff: Anel Ponce  Anesthesia: Sedation   Estimated Blood Loss: none  Specimens: * Cannot find log *   Findings: occluded short segment venous outflow, needs surgical revision   Complications: none

## 2018-04-10 ENCOUNTER — APPOINTMENT (OUTPATIENT)
Dept: CT IMAGING | Age: 33
DRG: 853 | End: 2018-04-10
Attending: SURGERY
Payer: MEDICARE

## 2018-04-10 ENCOUNTER — DOCUMENTATION ONLY (OUTPATIENT)
Dept: FAMILY MEDICINE CLINIC | Age: 33
End: 2018-04-10

## 2018-04-10 LAB
ANION GAP SERPL CALC-SCNC: 6 MMOL/L (ref 5–15)
BUN SERPL-MCNC: 25 MG/DL (ref 6–20)
BUN/CREAT SERPL: 4 (ref 12–20)
CALCIUM SERPL-MCNC: 8.3 MG/DL (ref 8.5–10.1)
CHLORIDE SERPL-SCNC: 105 MMOL/L (ref 97–108)
CO2 SERPL-SCNC: 29 MMOL/L (ref 21–32)
CREAT SERPL-MCNC: 6.74 MG/DL (ref 0.55–1.02)
GLUCOSE BLD STRIP.AUTO-MCNC: 101 MG/DL (ref 65–100)
GLUCOSE BLD STRIP.AUTO-MCNC: 114 MG/DL (ref 65–100)
GLUCOSE BLD STRIP.AUTO-MCNC: 78 MG/DL (ref 65–100)
GLUCOSE BLD STRIP.AUTO-MCNC: 85 MG/DL (ref 65–100)
GLUCOSE BLD STRIP.AUTO-MCNC: 99 MG/DL (ref 65–100)
GLUCOSE SERPL-MCNC: 82 MG/DL (ref 65–100)
POTASSIUM SERPL-SCNC: 5 MMOL/L (ref 3.5–5.1)
SERVICE CMNT-IMP: ABNORMAL
SERVICE CMNT-IMP: ABNORMAL
SERVICE CMNT-IMP: NORMAL
SODIUM SERPL-SCNC: 140 MMOL/L (ref 136–145)

## 2018-04-10 PROCEDURE — 65270000029 HC RM PRIVATE

## 2018-04-10 PROCEDURE — 74011250637 HC RX REV CODE- 250/637: Performed by: INTERNAL MEDICINE

## 2018-04-10 PROCEDURE — 80048 BASIC METABOLIC PNL TOTAL CA: CPT | Performed by: INTERNAL MEDICINE

## 2018-04-10 PROCEDURE — 74011250637 HC RX REV CODE- 250/637: Performed by: NURSE PRACTITIONER

## 2018-04-10 PROCEDURE — 94640 AIRWAY INHALATION TREATMENT: CPT

## 2018-04-10 PROCEDURE — 74011250636 HC RX REV CODE- 250/636: Performed by: INTERNAL MEDICINE

## 2018-04-10 PROCEDURE — 74011000250 HC RX REV CODE- 250: Performed by: HOSPITALIST

## 2018-04-10 PROCEDURE — 74011250637 HC RX REV CODE- 250/637: Performed by: HOSPITALIST

## 2018-04-10 PROCEDURE — 74011636637 HC RX REV CODE- 636/637: Performed by: HOSPITALIST

## 2018-04-10 PROCEDURE — 36415 COLL VENOUS BLD VENIPUNCTURE: CPT | Performed by: INTERNAL MEDICINE

## 2018-04-10 PROCEDURE — 82962 GLUCOSE BLOOD TEST: CPT

## 2018-04-10 PROCEDURE — 74176 CT ABD & PELVIS W/O CONTRAST: CPT

## 2018-04-10 RX ADMIN — HYDROMORPHONE HYDROCHLORIDE 0.5 MG: 2 INJECTION, SOLUTION INTRAMUSCULAR; INTRAVENOUS; SUBCUTANEOUS at 03:02

## 2018-04-10 RX ADMIN — OXYCODONE HYDROCHLORIDE 10 MG: 5 TABLET ORAL at 04:13

## 2018-04-10 RX ADMIN — HYDROXYCHLOROQUINE SULFATE 200 MG: 200 TABLET, FILM COATED ORAL at 18:35

## 2018-04-10 RX ADMIN — HYDROXYCHLOROQUINE SULFATE 200 MG: 200 TABLET, FILM COATED ORAL at 10:44

## 2018-04-10 RX ADMIN — ARFORMOTEROL TARTRATE 15 MCG: 15 SOLUTION RESPIRATORY (INHALATION) at 10:09

## 2018-04-10 RX ADMIN — HYDROMORPHONE HYDROCHLORIDE 0.5 MG: 2 INJECTION, SOLUTION INTRAMUSCULAR; INTRAVENOUS; SUBCUTANEOUS at 18:36

## 2018-04-10 RX ADMIN — PANTOPRAZOLE SODIUM 40 MG: 40 TABLET, DELAYED RELEASE ORAL at 06:58

## 2018-04-10 RX ADMIN — Medication 10 ML: at 21:42

## 2018-04-10 RX ADMIN — LACTULOSE 10 G: 20 SOLUTION ORAL at 14:56

## 2018-04-10 RX ADMIN — AMLODIPINE BESYLATE 5 MG: 5 TABLET ORAL at 10:44

## 2018-04-10 RX ADMIN — PREDNISONE 5 MG: 5 TABLET ORAL at 10:44

## 2018-04-10 RX ADMIN — Medication 2500 MCG: at 12:41

## 2018-04-10 RX ADMIN — HYDROMORPHONE HYDROCHLORIDE 0.5 MG: 2 INJECTION, SOLUTION INTRAMUSCULAR; INTRAVENOUS; SUBCUTANEOUS at 22:33

## 2018-04-10 RX ADMIN — APIXABAN 5 MG: 5 TABLET, FILM COATED ORAL at 18:35

## 2018-04-10 RX ADMIN — CASTOR OIL AND BALSAM, PERU: 788; 87 OINTMENT TOPICAL at 14:57

## 2018-04-10 RX ADMIN — AMITRIPTYLINE HYDROCHLORIDE 25 MG: 10 TABLET, FILM COATED ORAL at 21:33

## 2018-04-10 RX ADMIN — HYDROMORPHONE HYDROCHLORIDE 0.5 MG: 2 INJECTION, SOLUTION INTRAMUSCULAR; INTRAVENOUS; SUBCUTANEOUS at 11:04

## 2018-04-10 RX ADMIN — CARVEDILOL 12.5 MG: 12.5 TABLET, FILM COATED ORAL at 10:44

## 2018-04-10 RX ADMIN — Medication 10 ML: at 07:03

## 2018-04-10 RX ADMIN — HYDROMORPHONE HYDROCHLORIDE 0.5 MG: 2 INJECTION, SOLUTION INTRAMUSCULAR; INTRAVENOUS; SUBCUTANEOUS at 06:58

## 2018-04-10 RX ADMIN — OXYCODONE HYDROCHLORIDE 10 MG: 5 TABLET ORAL at 10:44

## 2018-04-10 RX ADMIN — CARVEDILOL 12.5 MG: 12.5 TABLET, FILM COATED ORAL at 18:36

## 2018-04-10 RX ADMIN — GEMFIBROZIL 300 MG: 600 TABLET ORAL at 10:44

## 2018-04-10 RX ADMIN — Medication 10 ML: at 03:03

## 2018-04-10 RX ADMIN — OXYCODONE HYDROCHLORIDE 10 MG: 5 TABLET ORAL at 21:38

## 2018-04-10 RX ADMIN — APIXABAN 5 MG: 5 TABLET, FILM COATED ORAL at 10:44

## 2018-04-10 RX ADMIN — BUDESONIDE 500 MCG: 0.5 INHALANT RESPIRATORY (INHALATION) at 10:10

## 2018-04-10 RX ADMIN — POLYETHYLENE GLYCOL 3350 17 G: 17 POWDER, FOR SOLUTION ORAL at 10:43

## 2018-04-10 RX ADMIN — SENNOSIDES 17.2 MG: 8.6 TABLET, FILM COATED ORAL at 21:33

## 2018-04-10 RX ADMIN — CASTOR OIL AND BALSAM, PERU: 788; 87 OINTMENT TOPICAL at 21:33

## 2018-04-10 RX ADMIN — CASTOR OIL AND BALSAM, PERU: 788; 87 OINTMENT TOPICAL at 07:03

## 2018-04-10 RX ADMIN — HYDROMORPHONE HYDROCHLORIDE 0.5 MG: 2 INJECTION, SOLUTION INTRAMUSCULAR; INTRAVENOUS; SUBCUTANEOUS at 14:56

## 2018-04-10 NOTE — PROGRESS NOTES
General Surgery Daily Progress Note    Admit Date: 3/11/2018  Post-Operative Day: * No surgery date entered * from Procedure(s):  REVISION RIGHT ARM ARTERIO VENOUS GRAFT      Subjective:     Last 24 hrs: Pt reports lower abd pain, no n/v.  CT scan reviewed- slightly smaller fluid tamar'n but contents left are viscous. Last dialyzed Sat and only an hour. Problem w/ access    Objective:     Blood pressure (!) 133/91, pulse 86, temperature 98.4 °F (36.9 °C), resp. rate 16, height 5' 5\" (1.651 m), weight 162 lb 4.8 oz (73.6 kg), SpO2 96 %, unknown if currently breastfeeding. Temp (24hrs), Av.4 °F (36.9 °C), Min:98 °F (36.7 °C), Max:98.6 °F (37 °C)      _____________________  Physical Exam:     Alert and Oriented, x3, in no acute distress. Cardiovascular: RRR, LUE w/  peripheral edema; minimal LLE  Lungs:CTAB anteriorally  Abdomen: soft, TTP mid, low abd to LLQ; R side drain to gravity drainage - 200cc ss drainage out yesterday; remains ss. Assessment:   Principal Problem:    Peritonitis ([address] (3/11/2018)    Active Problems:    Generalized abdominal pain (2018)      Other constipation (2018)      Other ascites (2018)            Plan:     Graft revision tomorrow  Further plans per Dr Donnie Hui regarding CT results  Maintain drain    Data Review:    No results for input(s): WBC, HGB, HCT, PLT, HGBEXT, HCTEXT, PLTEXT in the last 72 hours. Recent Labs      18   1500   NA  139   K  4.7   CL  104   CO2  29   GLU  85   BUN  21*   CREA  5.82*   CA  8.4*   ALB  1.3*   SGOT  16   ALT  11*     No results for input(s): AML, LPSE in the last 72 hours.         ______________________  Medications:    Current Facility-Administered Medications   Medication Dose Route Frequency    epoetin pia (EPOGEN;PROCRIT) injection 10,000 Units  10,000 Units SubCUTAneous DIALYSIS TUE, THU & SAT    HYDROmorphone (PF) (DILAUDID) injection 0.5 mg  0.5 mg IntraVENous Q4H PRN    oxyCODONE IR (ROXICODONE) tablet 10 mg  10 mg Oral Q6H PRN    polyethylene glycol (MIRALAX) packet 17 g  17 g Oral DAILY    senna (SENOKOT) tablet 17.2 mg  2 Tab Oral QHS    balsam peru-castor oil (VENELEX)  mg/gram ointment   Topical Q8H    amLODIPine (NORVASC) tablet 5 mg  5 mg Oral DAILY    carvedilol (COREG) tablet 12.5 mg  12.5 mg Oral BID WITH MEALS    dextrose 5% and 0.9% NaCl infusion  15 mL/hr IntraVENous CONTINUOUS    hydrALAZINE (APRESOLINE) 20 mg/mL injection 5 mg  5 mg IntraVENous Q6H PRN    apixaban (ELIQUIS) tablet 5 mg  5 mg Oral BID    0.9% sodium chloride infusion 250 mL  250 mL IntraVENous PRN    glucose chewable tablet 16 g  4 Tab Oral PRN    dextrose (D50W) injection syrg 12.5-25 g  12.5-25 g IntraVENous PRN    glucagon (GLUCAGEN) injection 1 mg  1 mg IntraMUSCular PRN    predniSONE (DELTASONE) tablet 5 mg  5 mg Oral DAILY    pantoprazole (PROTONIX) tablet 40 mg  40 mg Oral ACB    cyanocobalamin (VITAMIN B12) tablet 2,500 mcg  2,500 mcg Oral DAILY    hydroxychloroquine (PLAQUENIL) tablet 200 mg  200 mg Oral BID    albuterol (PROVENTIL VENTOLIN) nebulizer solution 2.5 mg  2.5 mg Nebulization Q4H PRN    gemfibrozil (LOPID) tablet 300 mg  300 mg Oral DAILY    amitriptyline (ELAVIL) tablet 25 mg  25 mg Oral QHS    sodium chloride (NS) flush 5-10 mL  5-10 mL IntraVENous Q8H    sodium chloride (NS) flush 5-10 mL  5-10 mL IntraVENous PRN    ondansetron (ZOFRAN) injection 4 mg  4 mg IntraVENous Q4H PRN    arformoterol (BROVANA) neb solution 15 mcg  15 mcg Nebulization BID RT    And    budesonide (PULMICORT) 500 mcg/2 ml nebulizer suspension  500 mcg Nebulization BID RT       Eleanor Balderas NP  4/10/2018

## 2018-04-10 NOTE — PROGRESS NOTES
Hospitalist Progress Note            This is a 27-year-old female with multiple medical problems including SLE, end-stage renal disease (on hemodialysis, TTS), pulmonary embolism, on Eliquis (2012), hyperlipidemia, hypertension, recent Candida peritonitis, chronic bilateral pain.  She comes over here because of abdominal pain since last 4 or 5 days. Daily Progress Note: 4/10/2018    Assessment/Plan:   Acute peritonitis (e coli) with sepsis  - also with recent candida peritonitis  - CT abd 3/11 Large amount of free intraperitoneal fluid appears somewhat loculated  - Peritoneal fluid heavy E coli on 3/11 and 3/18  - s/p paracentesis  - multiple CT scans showing interval changes, last on 3/30: Stable peritoneal fluid collection containing a percutaneous drain  - drain exchanged and upsized by IR on 4/5; monitor output. - completed IV abx on 4/6/18  - repeat scan today. If loculations continue she would need surgery. Gen surg following  - palliative care consulted for pain management, recs appreciated       HTN  -continue current meds     Thrombocytopenia   -resolved      Bilateral LE edema  -likely third-spacing  -Dopplers negative for DVT      SLE  -continue home meds  -D/C'd Imuran, allopurinol by nephro with acute infection and thrombocytopenia      History of PE  - on Eliquis since 2012  - per documentation from prior attending: \"appreciate discussion with PMD 3/13. The patient was seen by hematology in 2013 but seems was lost to follow up. I think at that time the plan was to stop anticoagulation if her SLE is stable. Spoke to Dr Anuja Brewer, he feels that the patient can come off Eliquis. His records do not suggest any recurrence of DVT/PE.  Spoke to Dr Richard Smith, he has not seen the patient since >1 yr. If antiphospholipid antibodies negative then the patient can come off Eliquis\"  - 10/17 V/Q high probability for PE  - Per oncology the patient should be continued to Eliquis indefinitely      Hypophosphatemia:  -replete prn     ESRD  -on HD TTS  -RUE AVG malfunctioning, vasc surg consult by nephrology  -Surgery tomorrow     Anemia of chronic disease  -Transfused 2 units PRBC with HD 3/15     Hepatic steatosis    Severe protein calorie malnutrition  -Consult nutrition    Constipation:  -bowel regimen      Full code       Subjective:   abd pain unchanged per patient, no n/v/d, no dyspnea    Review of Systems:   As above    Objective:     Visit Vitals    /70    Pulse 85    Temp 98.4 °F (36.9 °C)    Resp 18    Ht 5' 5\" (1.651 m)    Wt 73.6 kg (162 lb 4.8 oz)    SpO2 96%    BMI 27.01 kg/m2    O2 Flow Rate (L/min): 2 l/min O2 Device: Room air    Temp (24hrs), Av.3 °F (36.8 °C), Min:98 °F (36.7 °C), Max:98.6 °F (37 °C)          1901 - 04/10 0700  In: -   Out: 275 [Drains:275]    EXAM:  General: Chronically-ill appearing, NAD    HEENT: Anicteric sclerae, MMM  Neck:   Supple, trachea midline  Lungs:  CTA bilaterally. No Wheezing/Rhonchi/Rales  Heart:  Regular rhythm,  No murmur   Abdomen: Distended. Mild diffuse tenderness unchanged. Paracentesis drain in place draining red fluid, bs+  Extremities: Warm, ++peripheral edema  Neurologic:  normal speech, moves all extremities   Psych:   Not anxious nor agitated        Data Review:     Recent Results (from the past 24 hour(s))   METABOLIC PANEL, COMPREHENSIVE    Collection Time: 18  3:00 PM   Result Value Ref Range    Sodium 139 136 - 145 mmol/L    Potassium 4.7 3.5 - 5.1 mmol/L    Chloride 104 97 - 108 mmol/L    CO2 29 21 - 32 mmol/L    Anion gap 6 5 - 15 mmol/L    Glucose 85 65 - 100 mg/dL    BUN 21 (H) 6 - 20 MG/DL    Creatinine 5.82 (H) 0.55 - 1.02 MG/DL    BUN/Creatinine ratio 4 (L) 12 - 20      GFR est AA 10 (L) >60 ml/min/1.73m2    GFR est non-AA 8 (L) >60 ml/min/1.73m2    Calcium 8.4 (L) 8.5 - 10.1 MG/DL    Bilirubin, total 0.2 0.2 - 1.0 MG/DL    ALT (SGPT) 11 (L) 12 - 78 U/L    AST (SGOT) 16 15 - 37 U/L    Alk. phosphatase 92 45 - 117 U/L    Protein, total 6.1 (L) 6.4 - 8.2 g/dL    Albumin 1.3 (L) 3.5 - 5.0 g/dL    Globulin 4.8 (H) 2.0 - 4.0 g/dL    A-G Ratio 0.3 (L) 1.1 - 2.2     GLUCOSE, POC    Collection Time: 04/09/18  4:27 PM   Result Value Ref Range    Glucose (POC) 111 (H) 65 - 100 mg/dL    Performed by Marbella Martinez [id number], POC    Collection Time: 04/09/18  9:23 PM   Result Value Ref Range    Glucose (POC) 75 65 - 100 mg/dL    Performed by Julissa Achilles    GLUCOSE, POC    Collection Time: 04/09/18  9:45 PM   Result Value Ref Range    Glucose (POC) 86 65 - 100 mg/dL    Performed by Julissa Achilles    GLUCOSE, POC    Collection Time: 04/10/18  6:12 AM   Result Value Ref Range    Glucose (POC) 114 (H) 65 - 100 mg/dL    Performed by Fiordaliza Pop        Principal Problem:    Peritonitis ([address] (3/11/2018)    Active Problems:    Generalized abdominal pain (4/4/2018)      Other constipation (4/4/2018)      Other ascites (4/4/2018)        Medications reviewed  Current Facility-Administered Medications   Medication Dose Route Frequency    HYDROmorphone (PF) (DILAUDID) injection 0.5 mg  0.5 mg IntraVENous Q4H PRN    oxyCODONE IR (ROXICODONE) tablet 10 mg  10 mg Oral Q6H PRN    polyethylene glycol (MIRALAX) packet 17 g  17 g Oral DAILY    senna (SENOKOT) tablet 17.2 mg  2 Tab Oral QHS    balsam peru-castor oil (VENELEX)  mg/gram ointment   Topical Q8H    amLODIPine (NORVASC) tablet 5 mg  5 mg Oral DAILY    carvedilol (COREG) tablet 12.5 mg  12.5 mg Oral BID WITH MEALS    dextrose 5% and 0.9% NaCl infusion  15 mL/hr IntraVENous CONTINUOUS    hydrALAZINE (APRESOLINE) 20 mg/mL injection 5 mg  5 mg IntraVENous Q6H PRN    apixaban (ELIQUIS) tablet 5 mg  5 mg Oral BID    0.9% sodium chloride infusion 250 mL  250 mL IntraVENous PRN    glucose chewable tablet 16 g  4 Tab Oral PRN    dextrose (D50W) injection syrg 12.5-25 g  12.5-25 g IntraVENous PRN    glucagon (GLUCAGEN) injection 1 mg  1 mg IntraMUSCular PRN    epoetin pia (EPOGEN;PROCRIT) 14,000 Units  14,000 Units SubCUTAneous DIALYSIS TUE, THU & SAT    predniSONE (DELTASONE) tablet 5 mg  5 mg Oral DAILY    pantoprazole (PROTONIX) tablet 40 mg  40 mg Oral ACB    cyanocobalamin (VITAMIN B12) tablet 2,500 mcg  2,500 mcg Oral DAILY    hydroxychloroquine (PLAQUENIL) tablet 200 mg  200 mg Oral BID    albuterol (PROVENTIL VENTOLIN) nebulizer solution 2.5 mg  2.5 mg Nebulization Q4H PRN    gemfibrozil (LOPID) tablet 300 mg  300 mg Oral DAILY    amitriptyline (ELAVIL) tablet 25 mg  25 mg Oral QHS    sodium chloride (NS) flush 5-10 mL  5-10 mL IntraVENous Q8H    sodium chloride (NS) flush 5-10 mL  5-10 mL IntraVENous PRN    ondansetron (ZOFRAN) injection 4 mg  4 mg IntraVENous Q4H PRN    arformoterol (BROVANA) neb solution 15 mcg  15 mcg Nebulization BID RT    And    budesonide (PULMICORT) 500 mcg/2 ml nebulizer suspension  500 mcg Nebulization BID RT       Michael Layne MD

## 2018-04-10 NOTE — PROGRESS NOTES
Physical Therapy Note:  Patient received in bed sleeping in NAD. Introduced this writer and encouraged PT intervention. She politely but immediately refused stating that she was in too much pain and asked to return later. Next dose scheduled after 15:00 and discussed that therapy may not be able to return today with continued refusal. Will follow.    Tracy Erwin, PT, DPT

## 2018-04-10 NOTE — ROUTINE PROCESS
Bedside and Verbal shift change report given to Migue (oncoming nurse) by Jeanie Romeo (offgoing nurse). Report included the following information SBAR, Kardex, ED Summary, Procedure Summary, Intake/Output, MAR, Accordion and Recent Results.

## 2018-04-10 NOTE — PROGRESS NOTES
Patient name: Adriano Larios  MRN: [id number]    Nephrology Progress note:    Assessment:    ESRD: on HD [facility]     AVG - poor function    E. Coli Bacteremia: source intra-abd source/ E. Coli peritonitis. IV zosyn. Repeat Bld Cx 3/11-> Neg. Recent hx of Candida peritonitis. 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: Hgb remains below goal - on EPO    SHPT: Hypophosphatemia-> give Neutra-Phos    Protein malnutrition    Hypoglycemia: wean D5NS fluids as tolerated    Edema: 3rd spacing-> increase UF on HD    Plan:   labs today to make sure K is ok  AVG revision in AM;to note she is on eliquis ,risk of bleed;HD in AM  Subjective  For AVG revision in AM;to note she is on eliquis       Exam:  Visit Vitals    BP (!) 133/91    Pulse 86    Temp 98.4 °F (36.9 °C)    Resp 16    Ht 5' 5\" (1.651 m)    Wt 73.6 kg (162 lb 4.8 oz)    SpO2 96%    BMI 27.01 kg/m2     Wt Readings from Last 3 Encounters:   04/10/18 73.6 kg (162 lb 4.8 oz)   03/05/18 68 kg (150 lb)   01/29/18 69.4 kg (153 lb)       Intake/Output Summary (Last 24 hours) at 04/10/18 1329  Last data filed at 04/09/18 2227   Gross per 24 hour   Intake                0 ml   Output              200 ml   Net             -200 ml       NAD  CTAB/l  RUE/RLE edema  Abd drain, mild TTP      Labs/Data:    Lab Results   Component Value Date/Time    WBC 4.2 04/05/2018 04:09 AM    Hemoglobin (POC) 7.8 (L) 01/19/2018 12:30 PM    HGB 8.1 (L) 04/05/2018 04:09 AM    Hematocrit (POC) 23 (L) 01/19/2018 12:30 PM    HCT 27.3 (L) 04/05/2018 04:09 AM    PLATELET 188 07/28/2368 04:09 AM    MCV 88.3 04/05/2018 04:09 AM       Lab Results   Component Value Date/Time    Sodium 139 04/09/2018 03:00 PM    Potassium 4.7 04/09/2018 03:00 PM    Chloride 104 04/09/2018 03:00 PM    CO2 29 04/09/2018 03:00 PM    Anion gap 6 04/09/2018 03:00 PM    Glucose 85 04/09/2018 03:00 PM    BUN 21 (H) 04/09/2018 03:00 PM    Creatinine 5.82 (H) 04/09/2018 03:00 PM    BUN/Creatinine ratio 4 (L) 04/09/2018 03:00 PM    GFR est AA 10 (L) 04/09/2018 03:00 PM    GFR est non-AA 8 (L) 04/09/2018 03:00 PM    Calcium 8.4 (L) 04/09/2018 03:00 PM        Discussed with patient

## 2018-04-10 NOTE — PROGRESS NOTES
Patient remain in [facility] [place] following admission on 3/11/18 for Peritonitis. NN discussed ACP with in-patient . None on file at this time. Continue to have a peritoneal drain in place. Patient had graft angiography 4/9/18. Patient has started skilled therapy for increase of mobility. For possible repeat CT scan on today. NN will continue to observe for discharge and post- hospital follow-up.

## 2018-04-10 NOTE — PROGRESS NOTES
Cm continuing to follow for transitions of care, discussed patient during rounds. Patient will go to the OR tomorrow for a revision of the right arm AV graft. Patients' discharge needs are unknown at this time; cm will continue to follow and wait for therapy recommendations.  (Pateint goes to [facility])  [employer] , YOLA

## 2018-04-11 ENCOUNTER — ANESTHESIA (OUTPATIENT)
Dept: SURGERY | Age: 33
DRG: 853 | End: 2018-04-11
Payer: MEDICARE

## 2018-04-11 ENCOUNTER — ANESTHESIA EVENT (OUTPATIENT)
Dept: SURGERY | Age: 33
DRG: 853 | End: 2018-04-11
Payer: MEDICARE

## 2018-04-11 LAB
ABO + RH BLD: NORMAL
ANION GAP SERPL CALC-SCNC: 10 MMOL/L (ref 5–15)
BACTERIA SPEC CULT: NORMAL
BASOPHILS # BLD: 0 K/UL (ref 0–0.1)
BASOPHILS NFR BLD: 1 % (ref 0–1)
BLOOD GROUP ANTIBODIES SERPL: NORMAL
BUN SERPL-MCNC: 26 MG/DL (ref 6–20)
BUN/CREAT SERPL: 4 (ref 12–20)
CALCIUM SERPL-MCNC: 8.4 MG/DL (ref 8.5–10.1)
CHLORIDE SERPL-SCNC: 108 MMOL/L (ref 97–108)
CO2 SERPL-SCNC: 26 MMOL/L (ref 21–32)
CREAT SERPL-MCNC: 7.18 MG/DL (ref 0.55–1.02)
DIFFERENTIAL METHOD BLD: ABNORMAL
EOSINOPHIL # BLD: 0 K/UL (ref 0–0.4)
EOSINOPHIL NFR BLD: 0 % (ref 0–7)
ERYTHROCYTE [DISTWIDTH] IN BLOOD BY AUTOMATED COUNT: 22.5 % (ref 11.5–14.5)
GLUCOSE BLD STRIP.AUTO-MCNC: 65 MG/DL (ref 65–100)
GLUCOSE BLD STRIP.AUTO-MCNC: 71 MG/DL (ref 65–100)
GLUCOSE BLD STRIP.AUTO-MCNC: 73 MG/DL (ref 65–100)
GLUCOSE BLD STRIP.AUTO-MCNC: 78 MG/DL (ref 65–100)
GLUCOSE BLD STRIP.AUTO-MCNC: 81 MG/DL (ref 65–100)
GLUCOSE BLD STRIP.AUTO-MCNC: 91 MG/DL (ref 65–100)
GLUCOSE BLD STRIP.AUTO-MCNC: 95 MG/DL (ref 65–100)
GLUCOSE SERPL-MCNC: 81 MG/DL (ref 65–100)
GRAM STN SPEC: NORMAL
GRAM STN SPEC: NORMAL
HCT VFR BLD AUTO: 29.9 % (ref 35–47)
HGB BLD-MCNC: 8.7 G/DL (ref 11.5–16)
IMM GRANULOCYTES # BLD: 0 K/UL (ref 0–0.04)
IMM GRANULOCYTES NFR BLD AUTO: 1 % (ref 0–0.5)
LYMPHOCYTES # BLD: 0.9 K/UL (ref 0.8–3.5)
LYMPHOCYTES NFR BLD: 20 % (ref 12–49)
MAGNESIUM SERPL-MCNC: 1.9 MG/DL (ref 1.6–2.4)
MCH RBC QN AUTO: 26.8 PG (ref 26–34)
MCHC RBC AUTO-ENTMCNC: 29.1 G/DL (ref 30–36.5)
MCV RBC AUTO: 92 FL (ref 80–99)
MONOCYTES # BLD: 0.7 K/UL (ref 0–1)
MONOCYTES NFR BLD: 17 % (ref 5–13)
NEUTS SEG # BLD: 2.8 K/UL (ref 1.8–8)
NEUTS SEG NFR BLD: 61 % (ref 32–75)
NRBC # BLD: 0 K/UL (ref 0–0.01)
NRBC BLD-RTO: 0 PER 100 WBC
PLATELET # BLD AUTO: 314 K/UL (ref 150–400)
PMV BLD AUTO: 10.3 FL (ref 8.9–12.9)
POTASSIUM SERPL-SCNC: 5.1 MMOL/L (ref 3.5–5.1)
RBC # BLD AUTO: 3.25 M/UL (ref 3.8–5.2)
RBC MORPH BLD: ABNORMAL
SERVICE CMNT-IMP: NORMAL
SODIUM SERPL-SCNC: 144 MMOL/L (ref 136–145)
SPECIMEN EXP DATE BLD: NORMAL
WBC # BLD AUTO: 4.4 K/UL (ref 3.6–11)

## 2018-04-11 PROCEDURE — 77030010120 HC SHR COAG HARMO J&J -E: Performed by: SURGERY

## 2018-04-11 PROCEDURE — 77030002924 HC SUT GORTX WLGO -B: Performed by: SURGERY

## 2018-04-11 PROCEDURE — C1768 GRAFT, VASCULAR: HCPCS | Performed by: SURGERY

## 2018-04-11 PROCEDURE — 74011250636 HC RX REV CODE- 250/636: Performed by: SURGERY

## 2018-04-11 PROCEDURE — 77030031139 HC SUT VCRL2 J&J -A: Performed by: SURGERY

## 2018-04-11 PROCEDURE — 74011000250 HC RX REV CODE- 250: Performed by: SURGERY

## 2018-04-11 PROCEDURE — 74011250636 HC RX REV CODE- 250/636: Performed by: INTERNAL MEDICINE

## 2018-04-11 PROCEDURE — 74011636637 HC RX REV CODE- 636/637: Performed by: HOSPITALIST

## 2018-04-11 PROCEDURE — 74011000258 HC RX REV CODE- 258: Performed by: INTERNAL MEDICINE

## 2018-04-11 PROCEDURE — 74011250636 HC RX REV CODE- 250/636

## 2018-04-11 PROCEDURE — 77030014008 HC SPNG HEMSTAT J&J -C: Performed by: SURGERY

## 2018-04-11 PROCEDURE — 77010033678 HC OXYGEN DAILY

## 2018-04-11 PROCEDURE — 86900 BLOOD TYPING SEROLOGIC ABO: CPT | Performed by: SURGERY

## 2018-04-11 PROCEDURE — 76060000034 HC ANESTHESIA 1.5 TO 2 HR: Performed by: SURGERY

## 2018-04-11 PROCEDURE — 77030002987 HC SUT PROL J&J -B: Performed by: SURGERY

## 2018-04-11 PROCEDURE — 74011250637 HC RX REV CODE- 250/637: Performed by: INTERNAL MEDICINE

## 2018-04-11 PROCEDURE — 36415 COLL VENOUS BLD VENIPUNCTURE: CPT | Performed by: INTERNAL MEDICINE

## 2018-04-11 PROCEDURE — 74011250637 HC RX REV CODE- 250/637: Performed by: NURSE PRACTITIONER

## 2018-04-11 PROCEDURE — 85025 COMPLETE CBC W/AUTO DIFF WBC: CPT | Performed by: INTERNAL MEDICINE

## 2018-04-11 PROCEDURE — 77030032490 HC SLV COMPR SCD KNE COVD -B: Performed by: SURGERY

## 2018-04-11 PROCEDURE — 77030008467 HC STPLR SKN COVD -B: Performed by: SURGERY

## 2018-04-11 PROCEDURE — 77030020256 HC SOL INJ NACL 0.9%  500ML: Performed by: SURGERY

## 2018-04-11 PROCEDURE — 77030018846 HC SOL IRR STRL H20 ICUM -A: Performed by: SURGERY

## 2018-04-11 PROCEDURE — 76210000016 HC OR PH I REC 1 TO 1.5 HR: Performed by: SURGERY

## 2018-04-11 PROCEDURE — 77030003601 HC NDL NRV BLK BBMI -A

## 2018-04-11 PROCEDURE — 83735 ASSAY OF MAGNESIUM: CPT | Performed by: INTERNAL MEDICINE

## 2018-04-11 PROCEDURE — 82962 GLUCOSE BLOOD TEST: CPT

## 2018-04-11 PROCEDURE — 74011000272 HC RX REV CODE- 272: Performed by: SURGERY

## 2018-04-11 PROCEDURE — 77030011640 HC PAD GRND REM COVD -A: Performed by: SURGERY

## 2018-04-11 PROCEDURE — 74011000250 HC RX REV CODE- 250: Performed by: HOSPITALIST

## 2018-04-11 PROCEDURE — 77030018836 HC SOL IRR NACL ICUM -A: Performed by: SURGERY

## 2018-04-11 PROCEDURE — 74011250637 HC RX REV CODE- 250/637: Performed by: HOSPITALIST

## 2018-04-11 PROCEDURE — 65270000029 HC RM PRIVATE

## 2018-04-11 PROCEDURE — 77030010507 HC ADH SKN DERMBND J&J -B: Performed by: SURGERY

## 2018-04-11 PROCEDURE — 77030002933 HC SUT MCRYL J&J -A: Performed by: SURGERY

## 2018-04-11 PROCEDURE — 90935 HEMODIALYSIS ONE EVALUATION: CPT

## 2018-04-11 PROCEDURE — 94640 AIRWAY INHALATION TREATMENT: CPT

## 2018-04-11 PROCEDURE — 05WY0JZ REVISION OF SYNTHETIC SUBSTITUTE IN UPPER VEIN, OPEN APPROACH: ICD-10-PCS | Performed by: SURGERY

## 2018-04-11 PROCEDURE — 77030002996 HC SUT SLK J&J -A: Performed by: SURGERY

## 2018-04-11 PROCEDURE — 94664 DEMO&/EVAL PT USE INHALER: CPT

## 2018-04-11 PROCEDURE — 74011000250 HC RX REV CODE- 250

## 2018-04-11 PROCEDURE — 80048 BASIC METABOLIC PNL TOTAL CA: CPT | Performed by: INTERNAL MEDICINE

## 2018-04-11 PROCEDURE — 74011000258 HC RX REV CODE- 258: Performed by: ANESTHESIOLOGY

## 2018-04-11 PROCEDURE — 76010000153 HC OR TIME 1.5 TO 2 HR: Performed by: SURGERY

## 2018-04-11 PROCEDURE — 77030002986 HC SUT PROL J&J -A: Performed by: SURGERY

## 2018-04-11 DEVICE — VG THIN WALL 7MM X 10CM LINED
Type: IMPLANTABLE DEVICE | Site: ARM | Status: FUNCTIONAL
Brand: GORE-TEX   VASCULAR GRAFT

## 2018-04-11 RX ORDER — ONDANSETRON 2 MG/ML
4 INJECTION INTRAMUSCULAR; INTRAVENOUS AS NEEDED
Status: DISCONTINUED | OUTPATIENT
Start: 2018-04-11 | End: 2018-04-11 | Stop reason: HOSPADM

## 2018-04-11 RX ORDER — HYDROMORPHONE HYDROCHLORIDE 2 MG/ML
0.5 INJECTION, SOLUTION INTRAMUSCULAR; INTRAVENOUS; SUBCUTANEOUS ONCE
Status: DISCONTINUED | OUTPATIENT
Start: 2018-04-12 | End: 2018-04-11

## 2018-04-11 RX ORDER — SODIUM CHLORIDE 9 MG/ML
25 INJECTION, SOLUTION INTRAVENOUS CONTINUOUS
Status: DISCONTINUED | OUTPATIENT
Start: 2018-04-11 | End: 2018-04-11 | Stop reason: HOSPADM

## 2018-04-11 RX ORDER — MIDAZOLAM HYDROCHLORIDE 1 MG/ML
1 INJECTION, SOLUTION INTRAMUSCULAR; INTRAVENOUS AS NEEDED
Status: DISCONTINUED | OUTPATIENT
Start: 2018-04-11 | End: 2018-04-12 | Stop reason: SDUPTHER

## 2018-04-11 RX ORDER — KETAMINE HYDROCHLORIDE 10 MG/ML
INJECTION, SOLUTION INTRAMUSCULAR; INTRAVENOUS AS NEEDED
Status: DISCONTINUED | OUTPATIENT
Start: 2018-04-11 | End: 2018-04-11 | Stop reason: HOSPADM

## 2018-04-11 RX ORDER — SODIUM CHLORIDE 0.9 % (FLUSH) 0.9 %
5-10 SYRINGE (ML) INJECTION AS NEEDED
Status: DISCONTINUED | OUTPATIENT
Start: 2018-04-11 | End: 2018-04-20 | Stop reason: HOSPADM

## 2018-04-11 RX ORDER — SODIUM CHLORIDE, SODIUM LACTATE, POTASSIUM CHLORIDE, CALCIUM CHLORIDE 600; 310; 30; 20 MG/100ML; MG/100ML; MG/100ML; MG/100ML
75 INJECTION, SOLUTION INTRAVENOUS CONTINUOUS
Status: DISCONTINUED | OUTPATIENT
Start: 2018-04-11 | End: 2018-04-11 | Stop reason: HOSPADM

## 2018-04-11 RX ORDER — SODIUM CHLORIDE 0.9 % (FLUSH) 0.9 %
5-10 SYRINGE (ML) INJECTION EVERY 8 HOURS
Status: DISCONTINUED | OUTPATIENT
Start: 2018-04-11 | End: 2018-04-20 | Stop reason: HOSPADM

## 2018-04-11 RX ORDER — MIDAZOLAM HYDROCHLORIDE 1 MG/ML
1 INJECTION, SOLUTION INTRAMUSCULAR; INTRAVENOUS AS NEEDED
Status: DISCONTINUED | OUTPATIENT
Start: 2018-04-11 | End: 2018-04-20 | Stop reason: HOSPADM

## 2018-04-11 RX ORDER — FENTANYL CITRATE 50 UG/ML
INJECTION, SOLUTION INTRAMUSCULAR; INTRAVENOUS AS NEEDED
Status: DISCONTINUED | OUTPATIENT
Start: 2018-04-11 | End: 2018-04-11 | Stop reason: HOSPADM

## 2018-04-11 RX ORDER — ACETAMINOPHEN 10 MG/ML
INJECTION, SOLUTION INTRAVENOUS AS NEEDED
Status: DISCONTINUED | OUTPATIENT
Start: 2018-04-11 | End: 2018-04-11 | Stop reason: HOSPADM

## 2018-04-11 RX ORDER — FENTANYL CITRATE 50 UG/ML
25 INJECTION, SOLUTION INTRAMUSCULAR; INTRAVENOUS
Status: DISCONTINUED | OUTPATIENT
Start: 2018-04-11 | End: 2018-04-11 | Stop reason: HOSPADM

## 2018-04-11 RX ORDER — HYDROMORPHONE HYDROCHLORIDE 2 MG/ML
0.5 INJECTION, SOLUTION INTRAMUSCULAR; INTRAVENOUS; SUBCUTANEOUS ONCE
Status: COMPLETED | OUTPATIENT
Start: 2018-04-11 | End: 2018-04-11

## 2018-04-11 RX ORDER — PROPOFOL 10 MG/ML
INJECTION, EMULSION INTRAVENOUS
Status: DISCONTINUED | OUTPATIENT
Start: 2018-04-11 | End: 2018-04-11 | Stop reason: HOSPADM

## 2018-04-11 RX ORDER — FENTANYL CITRATE 50 UG/ML
50 INJECTION, SOLUTION INTRAMUSCULAR; INTRAVENOUS AS NEEDED
Status: DISCONTINUED | OUTPATIENT
Start: 2018-04-11 | End: 2018-04-12 | Stop reason: HOSPADM

## 2018-04-11 RX ORDER — SODIUM CHLORIDE, SODIUM LACTATE, POTASSIUM CHLORIDE, CALCIUM CHLORIDE 600; 310; 30; 20 MG/100ML; MG/100ML; MG/100ML; MG/100ML
125 INJECTION, SOLUTION INTRAVENOUS CONTINUOUS
Status: DISPENSED | OUTPATIENT
Start: 2018-04-11 | End: 2018-04-12

## 2018-04-11 RX ORDER — MIDAZOLAM HYDROCHLORIDE 1 MG/ML
0.5 INJECTION, SOLUTION INTRAMUSCULAR; INTRAVENOUS
Status: DISCONTINUED | OUTPATIENT
Start: 2018-04-11 | End: 2018-04-11 | Stop reason: HOSPADM

## 2018-04-11 RX ORDER — SODIUM CHLORIDE 0.9 % (FLUSH) 0.9 %
5-10 SYRINGE (ML) INJECTION AS NEEDED
Status: DISCONTINUED | OUTPATIENT
Start: 2018-04-11 | End: 2018-04-11 | Stop reason: HOSPADM

## 2018-04-11 RX ORDER — SODIUM CHLORIDE 9 MG/ML
25 INJECTION, SOLUTION INTRAVENOUS CONTINUOUS
Status: DISPENSED | OUTPATIENT
Start: 2018-04-11 | End: 2018-04-12

## 2018-04-11 RX ORDER — LIDOCAINE HYDROCHLORIDE 10 MG/ML
0.1 INJECTION, SOLUTION EPIDURAL; INFILTRATION; INTRACAUDAL; PERINEURAL AS NEEDED
Status: DISCONTINUED | OUTPATIENT
Start: 2018-04-11 | End: 2018-04-20 | Stop reason: HOSPADM

## 2018-04-11 RX ORDER — MORPHINE SULFATE 10 MG/ML
2 INJECTION, SOLUTION INTRAMUSCULAR; INTRAVENOUS
Status: DISCONTINUED | OUTPATIENT
Start: 2018-04-11 | End: 2018-04-11 | Stop reason: HOSPADM

## 2018-04-11 RX ORDER — ROPIVACAINE HYDROCHLORIDE 5 MG/ML
30 INJECTION, SOLUTION EPIDURAL; INFILTRATION; PERINEURAL ONCE
Status: ACTIVE | OUTPATIENT
Start: 2018-04-11 | End: 2018-04-11

## 2018-04-11 RX ORDER — CEFAZOLIN SODIUM IN 0.9 % NACL 2 G/100 ML
PLASTIC BAG, INJECTION (ML) INTRAVENOUS AS NEEDED
Status: DISCONTINUED | OUTPATIENT
Start: 2018-04-11 | End: 2018-04-11 | Stop reason: HOSPADM

## 2018-04-11 RX ORDER — DIPHENHYDRAMINE HYDROCHLORIDE 50 MG/ML
12.5 INJECTION, SOLUTION INTRAMUSCULAR; INTRAVENOUS AS NEEDED
Status: DISCONTINUED | OUTPATIENT
Start: 2018-04-11 | End: 2018-04-11 | Stop reason: HOSPADM

## 2018-04-11 RX ADMIN — AMLODIPINE BESYLATE 5 MG: 5 TABLET ORAL at 12:37

## 2018-04-11 RX ADMIN — HYDROMORPHONE HYDROCHLORIDE 0.5 MG: 2 INJECTION, SOLUTION INTRAMUSCULAR; INTRAVENOUS; SUBCUTANEOUS at 12:37

## 2018-04-11 RX ADMIN — Medication 16 G: at 12:35

## 2018-04-11 RX ADMIN — HYDROMORPHONE HYDROCHLORIDE 0.5 MG: 2 INJECTION, SOLUTION INTRAMUSCULAR; INTRAVENOUS; SUBCUTANEOUS at 23:30

## 2018-04-11 RX ADMIN — HYDROXYCHLOROQUINE SULFATE 200 MG: 200 TABLET, FILM COATED ORAL at 12:37

## 2018-04-11 RX ADMIN — PREDNISONE 5 MG: 5 TABLET ORAL at 12:37

## 2018-04-11 RX ADMIN — LACTULOSE 10 G: 20 SOLUTION ORAL at 12:37

## 2018-04-11 RX ADMIN — SODIUM CHLORIDE: 900 INJECTION, SOLUTION INTRAVENOUS at 06:52

## 2018-04-11 RX ADMIN — FENTANYL CITRATE 50 MCG: 50 INJECTION, SOLUTION INTRAMUSCULAR; INTRAVENOUS at 08:07

## 2018-04-11 RX ADMIN — BUDESONIDE 500 MCG: 0.5 INHALANT RESPIRATORY (INHALATION) at 23:22

## 2018-04-11 RX ADMIN — DEXTROSE MONOHYDRATE AND SODIUM CHLORIDE 15 ML/HR: 5; .9 INJECTION, SOLUTION INTRAVENOUS at 10:57

## 2018-04-11 RX ADMIN — EPOETIN ALFA 10000 UNITS: 10000 SOLUTION INTRAVENOUS; SUBCUTANEOUS at 23:49

## 2018-04-11 RX ADMIN — KETAMINE HYDROCHLORIDE 20 MG: 10 INJECTION, SOLUTION INTRAMUSCULAR; INTRAVENOUS at 07:45

## 2018-04-11 RX ADMIN — POLYETHYLENE GLYCOL 3350 17 G: 17 POWDER, FOR SOLUTION ORAL at 12:37

## 2018-04-11 RX ADMIN — MIDAZOLAM HYDROCHLORIDE 3 MG: 1 INJECTION, SOLUTION INTRAMUSCULAR; INTRAVENOUS at 07:28

## 2018-04-11 RX ADMIN — CARVEDILOL 12.5 MG: 12.5 TABLET, FILM COATED ORAL at 12:37

## 2018-04-11 RX ADMIN — FENTANYL CITRATE 50 MCG: 50 INJECTION, SOLUTION INTRAMUSCULAR; INTRAVENOUS at 07:29

## 2018-04-11 RX ADMIN — KETAMINE HYDROCHLORIDE 10 MG: 10 INJECTION, SOLUTION INTRAMUSCULAR; INTRAVENOUS at 08:00

## 2018-04-11 RX ADMIN — AMITRIPTYLINE HYDROCHLORIDE 25 MG: 10 TABLET, FILM COATED ORAL at 23:42

## 2018-04-11 RX ADMIN — ACETAMINOPHEN 1000 MG: 10 INJECTION, SOLUTION INTRAVENOUS at 08:01

## 2018-04-11 RX ADMIN — HYDROMORPHONE HYDROCHLORIDE 0.5 MG: 2 INJECTION, SOLUTION INTRAMUSCULAR; INTRAVENOUS; SUBCUTANEOUS at 02:43

## 2018-04-11 RX ADMIN — DEXTROSE MONOHYDRATE 25 G: 500 INJECTION PARENTERAL at 22:51

## 2018-04-11 RX ADMIN — ARFORMOTEROL TARTRATE 15 MCG: 15 SOLUTION RESPIRATORY (INHALATION) at 23:22

## 2018-04-11 RX ADMIN — Medication 2500 MCG: at 12:36

## 2018-04-11 RX ADMIN — PROPOFOL 50 MCG/KG/MIN: 10 INJECTION, EMULSION INTRAVENOUS at 07:45

## 2018-04-11 RX ADMIN — HYDROMORPHONE HYDROCHLORIDE 0.5 MG: 2 INJECTION, SOLUTION INTRAMUSCULAR; INTRAVENOUS; SUBCUTANEOUS at 16:30

## 2018-04-11 RX ADMIN — OXYCODONE HYDROCHLORIDE 10 MG: 5 TABLET ORAL at 22:50

## 2018-04-11 RX ADMIN — Medication 2 G: at 07:57

## 2018-04-11 RX ADMIN — SENNOSIDES 17.2 MG: 8.6 TABLET, FILM COATED ORAL at 23:42

## 2018-04-11 RX ADMIN — GEMFIBROZIL 300 MG: 600 TABLET ORAL at 12:36

## 2018-04-11 RX ADMIN — CASTOR OIL AND BALSAM, PERU: 788; 87 OINTMENT TOPICAL at 23:52

## 2018-04-11 RX ADMIN — HYDROMORPHONE HYDROCHLORIDE 0.5 MG: 2 INJECTION, SOLUTION INTRAMUSCULAR; INTRAVENOUS; SUBCUTANEOUS at 20:21

## 2018-04-11 NOTE — PERIOP NOTES
Surgicel fibrillar 4\"x4\" ref [facility] [id number] EXP [id number] used to right upper and Floseal 10ml exp 08- lot [id number] BY DR Janeth Mendez

## 2018-04-11 NOTE — PERIOP NOTES
TRANSFER - OUT REPORT:    Verbal report given to Roman(name) on Stew Jin  being transferred back to [facility](unit) for routine progression of care       Report consisted of patients Situation, Background, Assessment and   Recommendations(SBAR). Information from the following report(s) SBAR, OR Summary, Intake/Output and MAR was reviewed with the receiving nurse. Lines:   Peripheral IV 04/05/18 Left Forearm (Active)   Site Assessment Clean, dry, & intact 4/11/2018  9:40 AM   Phlebitis Assessment 0 4/11/2018  9:40 AM   Infiltration Assessment 0 4/11/2018  9:40 AM   Dressing Status Clean, dry, & intact 4/11/2018  9:40 AM   Dressing Type Transparent 4/10/2018  9:29 PM   Hub Color/Line Status End cap changed 4/10/2018 10:02 AM   Action Taken Open ports on tubing capped 4/10/2018 10:02 AM   Alcohol Cap Used Yes 4/10/2018 10:02 AM        Opportunity for questions and clarification was provided.       Patient transported with:   O2 @ 2 liters  Tech

## 2018-04-11 NOTE — DIALYSIS
[facility]  [phone]    Vitals   Pre   Post   Assessment   Pre   Post     Temp  Temp: 99.1 °F (37.3 °C) (04/11/18 1850)  99.2 LOC  Alert and oriented No change   HR   Pulse (Heart Rate): 86 (04/11/18 1910) 95 Lungs   Clear with diminished bases  no change   B/P   BP: 116/85 (04/11/18 1910) 143/104 Cardiac   S1S2  no change   Resp   Resp Rate: 16 (04/11/18 1910) 16 Skin   Warm and dry, flaky  no change   Pain level  Pain Intensity 1: 9 (04/11/18 0248) 8/10 pain to back and primary RN medicating Edema  Generalized overall and moderate to PETER Achieved 3.5 kg fluid removal w/o issue   Orders:    Duration:   Start:   1910 End:   2240     Total:   3.5 hours   Dialyzer:   Dialyzer/Set Up Inspection: Revaclear (04/07/18 1228)   K Bath:   Dialysate K (mEq/L): 3 (04/07/18 1228)   Ca Bath:   Dialysate CA (mEq/L): 2.5 (04/07/18 1228)   Na/Bicarb:   Dialysate NA (mEq/L): 140 (04/07/18 1228)   Target Fluid Removal:   Goal/Amount of Fluid to Remove (mL): 3000 mL (04/07/18 1228)   Access     Type & Location:   PETER AVG w + thrill/bruit   Labs     Obtained/Reviewed   Critical Results Called   Date when labs were drawn-  Hgb-    HGB   Date Value Ref Range Status   04/11/2018 8.7 (L) 11.5 - 16.0 g/dL Final     K-    Potassium   Date Value Ref Range Status   04/11/2018 5.1 3.5 - 5.1 mmol/L Final     Ca-   Calcium   Date Value Ref Range Status   04/11/2018 8.4 (L) 8.5 - 10.1 MG/DL Final     Bun-   BUN   Date Value Ref Range Status   04/11/2018 26 (H) 6 - 20 MG/DL Final     Creat-   Creatinine   Date Value Ref Range Status   04/11/2018 7.18 (H) 0.55 - 1.02 MG/DL Final       Medications/ Blood Products Given     Name   Dose   Route and Time           None by HD          Blood Volume Processed (BVP):    86.2 Net Fluid   Removed:  3500 mL   Comments   Time Out Done: yes, at 1900  Primary Nurse Rpt Pre: Yovana Hamilton RN  Primary Nurse Rpt Post: Yovana Hamilton RN  Pt Education: discussed aseptic technique and infection control  Care Plan: pt will run her entire tx  Tx Summary: pt arrived to HDU at or about 1850 and was assessed prior to tx; equipment set up prior to arrival per order and policy; order includes RTD 3.5 on 2251 bath and attempt to achieve 3 kg fluid removal, , ; PETER AVG and access arm are very swollen and post surgical/declot; cannulated to distal end of access to avoid swelling as much as possible and tx initiated w/o issue; pt completed her entire tx and achieved 3.5 kg fluid removal w/o issue, all blood rinsed back and aseptically de-cannulated and pressure dressings placed, hemostasis achieved w/o prolonged bleeding and dressings CDI and report given to primary nurse prior to departure. Admiting Diagnosis: ABD pain / PD catheter infection  Pt's previous clinic-[facility]  Consent signed - Informed Consent Verified: Yes (04/07/18 1228)  [facility] Consent - yes  Hepatitis Status- Ag neg and Ab susceptible as of 3/31/2018 in [facility] #- Machine Number: [id number] (04/07/18 1228)  Telemetry status- n/a  Pre-dialysis wt. - Pre-Dialysis Weight: 72.5 kg (159 lb 13.3 oz) (04/07/18 1228)

## 2018-04-11 NOTE — PERIOP NOTES
5121: RT arm nerve block done by Dr Tammie Chinchilla using 30cc of 0.5% ropivicaine with US guidance, with pt monitored, O2 @ 2l/m/nc and IV sedation given. Difficult block due to vascular structures RT side of neck. See anesthesia note.

## 2018-04-11 NOTE — ANESTHESIA PREPROCEDURE EVALUATION
Anesthetic History     PONV          Review of Systems / Medical History  Patient summary reviewed, nursing notes reviewed and pertinent labs reviewed    Pulmonary            Asthma        Neuro/Psych   Within defined limits           Cardiovascular    Hypertension                   GI/Hepatic/Renal     GERD: well controlled    Renal disease: dialysis and ESRD       Endo/Other        Anemia     Other Findings              Physical Exam    Airway  Mallampati: II  TM Distance: > 6 cm  Neck ROM: normal range of motion   Mouth opening: Normal     Cardiovascular  Regular rate and rhythm,  S1 and S2 normal,  no murmur, click, rub, or gallop             Dental  No notable dental hx       Pulmonary  Breath sounds clear to auscultation               Abdominal  GI exam deferred       Other Findings            Anesthetic Plan    ASA: 3  Anesthesia type: regional - supraclavicular block          Induction: Intravenous  Anesthetic plan and risks discussed with: Patient

## 2018-04-11 NOTE — PROGRESS NOTES
Hospitalist Progress Note            This is a 27-year-old female with multiple medical problems including SLE, end-stage renal disease (on hemodialysis, TTS), pulmonary embolism, on Eliquis (2012), hyperlipidemia, hypertension, recent Candida peritonitis, chronic bilateral pain.  She comes over here because of abdominal pain since last 4 or 5 days. Daily Progress Note: 4/11/2018    Assessment/Plan:   Acute peritonitis (e. coli) with sepsis  - also with recent candida peritonitis  - CT abd 3/11 Large amount of free intraperitoneal fluid appears somewhat loculated  - Peritoneal fluid heavy E coli on 3/11 and 3/18  - multiple CT scans showing interval changes, last on 3/30: Stable peritoneal fluid collection containing a percutaneous drain  - drain exchanged and upsized by IR on 4/5; monitor output  - completed IV abx on 4/6/18  - repeat scan yesterday with residual contents likely not amenable to perc. Drainage alone  - appreciate further Surgery recs       HTN  -continue current meds     Thrombocytopenia   -resolved      Bilateral LE edema  -likely third-spacing  -Dopplers negative for DVT      SLE  -continue home meds  -D/C'd Imuran, allopurinol by Nephro with acute infection and thrombocytopenia      History of PE  - on Eliquis since 2012  - per documentation from prior attending: \"appreciate discussion with PMD 3/13. The patient was seen by hematology in 2013 but seems was lost to follow up. I think at that time the plan was to stop anticoagulation if her SLE is stable. Spoke to Dr Irvin Galvan, he feels that the patient can come off Eliquis. His records do not suggest any recurrence of DVT/PE.  Spoke to Dr Mimi Hall, he has not seen the patient since >1 yr. If antiphospholipid antibodies negative then the patient can come off Eliquis\"  - 10/17 V/Q high probability for PE  - Per Oncology the patient should be continued to Eliquis indefinitely      Hypophosphatemia:  -replete prn     ESRD  -on HD TTS  -RUE AVG was malfunctioning, s/p OR this AM     Anemia of chronic disease  -Transfused 2 units PRBC with HD 3/15     Hepatic steatosis    Severe protein calorie malnutrition  -consult to nutrition    Constipation:  -bowel regimen      Full code  Eliquis    Discussed with patient/family  Dispo: TBD       Subjective:     abd pain unchanged per patient, no n/v/d, no dyspnea    Review of Systems:   As above    Objective:     Visit Vitals    BP (!) 144/104    Pulse 91    Temp 98.7 °F (37.1 °C)    Resp 18    Ht 5' 5\" (1.651 m)    Wt 73.6 kg (162 lb 4.8 oz)    SpO2 98%    Breastfeeding No    BMI 27.01 kg/m2    O2 Flow Rate (L/min): 2 l/min O2 Device: Room air    Temp (24hrs), Av.6 °F (37 °C), Min:98.2 °F (36.8 °C), Max:98.9 °F (37.2 °C)          1901 -  0700  In: -   Out: 200 [Drains:200]    EXAM:  General: Chronically-ill appearing, NAD    HEENT: Anicteric sclerae, MMM  Neck:   Supple, trachea midline  Lungs:  CTA bilaterally. No Wheezing  Heart:  Regular rhythm,  No murmur   Abdomen: Distended. Mild diffuse tenderness unchanged.  Paracentesis drain in place draining red fluid, bs+  Extremities: Warm, ++peripheral edema  Neurologic:  normal speech, moves all extremities       Data Review:     Recent Results (from the past 24 hour(s))   GLUCOSE, POC    Collection Time: 04/10/18 11:36 AM   Result Value Ref Range    Glucose (POC) 85 65 - 100 mg/dL    Performed by Ezio Templeton    METABOLIC PANEL, BASIC    Collection Time: 04/10/18  3:01 PM   Result Value Ref Range    Sodium 140 136 - 145 mmol/L    Potassium 5.0 3.5 - 5.1 mmol/L    Chloride 105 97 - 108 mmol/L    CO2 29 21 - 32 mmol/L    Anion gap 6 5 - 15 mmol/L    Glucose 82 65 - 100 mg/dL    BUN 25 (H) 6 - 20 MG/DL    Creatinine 6.74 (H) 0.55 - 1.02 MG/DL    BUN/Creatinine ratio 4 (L) 12 - 20      GFR est AA 9 (L) >60 ml/min/1.73m2    GFR est non-AA 7 (L) >60 ml/min/1.73m2    Calcium 8.3 (L) 8.5 - 10.1 MG/DL   GLUCOSE, POC    Collection Time: 04/10/18  4:38 PM   Result Value Ref Range    Glucose (POC) 99 65 - 100 mg/dL    Performed by Iris Miller    GLUCOSE, POC    Collection Time: 04/10/18  9:35 PM   Result Value Ref Range    Glucose (POC) 78 65 - 100 mg/dL    Performed by [id number], POC    Collection Time: 04/10/18 10:17 PM   Result Value Ref Range    Glucose (POC) 101 (H) 65 - 100 mg/dL    Performed by [id number] [facility]    Collection Time: 04/11/18  2:55 AM   Result Value Ref Range    Crossmatch Expiration 04/14/2018     ABO/Rh(D) Rochelle Merchant POSITIVE     Antibody screen NEG    CBC WITH AUTOMATED DIFF    Collection Time: 04/11/18  3:04 AM   Result Value Ref Range    WBC 4.4 3.6 - 11.0 K/uL    RBC 3.25 (L) 3.80 - 5.20 M/uL    HGB 8.7 (L) 11.5 - 16.0 g/dL    HCT 29.9 (L) 35.0 - 47.0 %    MCV 92.0 80.0 - 99.0 FL    MCH 26.8 26.0 - 34.0 PG    MCHC 29.1 (L) 30.0 - 36.5 g/dL    RDW 22.5 (H) 11.5 - 14.5 %    PLATELET 365 508 - 844 K/uL    MPV 10.3 8.9 - 12.9 FL    NRBC 0.0 0  WBC    ABSOLUTE NRBC 0.00 0.00 - 0.01 K/uL    NEUTROPHILS 61 32 - 75 %    LYMPHOCYTES 20 12 - 49 %    MONOCYTES 17 (H) 5 - 13 %    EOSINOPHILS 0 0 - 7 %    BASOPHILS 1 0 - 1 %    IMMATURE GRANULOCYTES 1 (H) 0.0 - 0.5 %    ABS. NEUTROPHILS 2.8 1.8 - 8.0 K/UL    ABS. LYMPHOCYTES 0.9 0.8 - 3.5 K/UL    ABS. MONOCYTES 0.7 0.0 - 1.0 K/UL    ABS. EOSINOPHILS 0.0 0.0 - 0.4 K/UL    ABS. BASOPHILS 0.0 0.0 - 0.1 K/UL    ABS. IMM.  GRANS. 0.0 0.00 - 0.04 K/UL    DF SMEAR SCANNED      RBC COMMENTS OVALOCYTES  PRESENT        RBC COMMENTS POLYCHROMASIA  1+        RBC COMMENTS ANISOCYTOSIS  2+        RBC COMMENTS TEARDROP CELLS  PRESENT       METABOLIC PANEL, BASIC    Collection Time: 04/11/18  3:04 AM   Result Value Ref Range    Sodium 144 136 - 145 mmol/L    Potassium 5.1 3.5 - 5.1 mmol/L    Chloride 108 97 - 108 mmol/L    CO2 26 21 - 32 mmol/L    Anion gap 10 5 - 15 mmol/L    Glucose 81 65 - 100 mg/dL    BUN 26 (H) 6 - 20 MG/DL    Creatinine 7.18 (H) 0.55 - 1.02 MG/DL    BUN/Creatinine ratio 4 (L) 12 - 20      GFR est AA 8 (L) >60 ml/min/1.73m2    GFR est non-AA 7 (L) >60 ml/min/1.73m2    Calcium 8.4 (L) 8.5 - 10.1 MG/DL   MAGNESIUM    Collection Time: 04/11/18  3:04 AM   Result Value Ref Range    Magnesium 1.9 1.6 - 2.4 mg/dL   GLUCOSE, POC    Collection Time: 04/11/18  6:07 AM   Result Value Ref Range    Glucose (POC) 95 65 - 100 mg/dL    Performed by Nini Mendoza        Principal Problem:    Peritonitis ([address] (3/11/2018)    Active Problems:    Generalized abdominal pain (4/4/2018)      Other constipation (4/4/2018)      Other ascites (4/4/2018)        Medications reviewed  Current Facility-Administered Medications   Medication Dose Route Frequency    lactated Ringers infusion  125 mL/hr IntraVENous CONTINUOUS    0.9% sodium chloride infusion  25 mL/hr IntraVENous CONTINUOUS    sodium chloride (NS) flush 5-10 mL  5-10 mL IntraVENous Q8H    sodium chloride (NS) flush 5-10 mL  5-10 mL IntraVENous PRN    lidocaine (PF) (XYLOCAINE) 10 mg/mL (1 %) injection 0.1 mL  0.1 mL SubCUTAneous PRN    fentaNYL citrate (PF) injection 50 mcg  50 mcg IntraVENous PRN    midazolam (VERSED) injection 1 mg  1 mg IntraVENous PRN    midazolam (VERSED) injection 1 mg  1 mg IntraVENous PRN    ropivacaine (PF) (NAROPIN) 5 mg/mL (0.5 %) injection 150 mg  30 mL Peripheral Nerve Block ONCE    epoetin pia (EPOGEN;PROCRIT) injection 10,000 Units  10,000 Units SubCUTAneous DIALYSIS TUE, THU & SAT    lactulose (CHRONULAC) solution 10 g  10 g Oral PRN    HYDROmorphone (PF) (DILAUDID) injection 0.5 mg  0.5 mg IntraVENous Q4H PRN    oxyCODONE IR (ROXICODONE) tablet 10 mg  10 mg Oral Q6H PRN    polyethylene glycol (MIRALAX) packet 17 g  17 g Oral DAILY    senna (SENOKOT) tablet 17.2 mg  2 Tab Oral QHS    balsam peru-castor oil (VENELEX)  mg/gram ointment   Topical Q8H    amLODIPine (NORVASC) tablet 5 mg  5 mg Oral DAILY    carvedilol (COREG) tablet 12.5 mg  12.5 mg Oral BID WITH MEALS    dextrose 5% and 0.9% NaCl infusion  15 mL/hr IntraVENous CONTINUOUS    hydrALAZINE (APRESOLINE) 20 mg/mL injection 5 mg  5 mg IntraVENous Q6H PRN    apixaban (ELIQUIS) tablet 5 mg  5 mg Oral BID    0.9% sodium chloride infusion 250 mL  250 mL IntraVENous PRN    glucose chewable tablet 16 g  4 Tab Oral PRN    dextrose (D50W) injection syrg 12.5-25 g  12.5-25 g IntraVENous PRN    glucagon (GLUCAGEN) injection 1 mg  1 mg IntraMUSCular PRN    predniSONE (DELTASONE) tablet 5 mg  5 mg Oral DAILY    pantoprazole (PROTONIX) tablet 40 mg  40 mg Oral ACB    cyanocobalamin (VITAMIN B12) tablet 2,500 mcg  2,500 mcg Oral DAILY    hydroxychloroquine (PLAQUENIL) tablet 200 mg  200 mg Oral BID    albuterol (PROVENTIL VENTOLIN) nebulizer solution 2.5 mg  2.5 mg Nebulization Q4H PRN    gemfibrozil (LOPID) tablet 300 mg  300 mg Oral DAILY    amitriptyline (ELAVIL) tablet 25 mg  25 mg Oral QHS    sodium chloride (NS) flush 5-10 mL  5-10 mL IntraVENous Q8H    sodium chloride (NS) flush 5-10 mL  5-10 mL IntraVENous PRN    ondansetron (ZOFRAN) injection 4 mg  4 mg IntraVENous Q4H PRN    arformoterol (BROVANA) neb solution 15 mcg  15 mcg Nebulization BID RT    And    budesonide (PULMICORT) 500 mcg/2 ml nebulizer suspension  500 mcg Nebulization BID RT     Facility-Administered Medications Ordered in Other Encounters   Medication Dose Route Frequency    ceFAZolin (ANCEF) 2g in 100 ml 0.9% NS IVPB   IntraVENous PRN    propofol (DIPRIVAN) 10 mg/mL injection   IntraVENous CONTINUOUS    ketamine (KETALAR) 10 mg/mL injection   IntraVENous PRN    acetaminophen (OFIRMEV) infusion   IntraVENous PRN    fentaNYL citrate (PF) injection    PRN       Lupe Roche MD

## 2018-04-11 NOTE — PROGRESS NOTES
Progress Note    Patient: Vandana Varghese MRN: [id number]  SSN: [id number]    YOB: 1985  Age: 29 y.o. Sex: female      Admit Date: 3/11/2018    Day of Surgery    Procedure:  Procedure(s):  REVISION RIGHT ARM ARTERIO VENOUS GRAFT     Subjective:     No acute surgical issues. Pt still reporting significant pain and nausea with food. There is still residual fluid collection noted on CT scan from yesterday.     Objective:     Visit Vitals    /86    Pulse 87    Temp 98.7 °F (37.1 °C)    Resp 16    Ht 5' 5\" (1.651 m)    Wt 162 lb 4.8 oz (73.6 kg)    SpO2 94%    Breastfeeding No    BMI 27.01 kg/m2       Temp (24hrs), Av.4 °F (36.9 °C), Min:97.5 °F (36.4 °C), Max:98.9 °F (37.2 °C)        Physical Exam:    Gen:  NAD  Pulm:  Unlabored  Abd:  S/ND/mild TTP in LLQ    Assessment:     Hospital Problems  Date Reviewed: 3/11/2018          Codes Class Noted POA    Generalized abdominal pain ICD-10-CM: R10.84  ICD-9-CM: 789.07  2018 Unknown        Other constipation ICD-10-CM: K59.09  ICD-9-CM: 564.09  2018 Unknown        Other ascites ICD-10-CM: R18.8  ICD-9-CM: 789.59  2018 Unknown        * (Principal)Peritonitis ([facility] [address] ICD-10-CM: K65.9  ICD-9-CM: 567.9  3/11/2018 Yes              Plan/Recommendations/Medical Decision Making:     - Abdominal fluid collection:  Does not appear to be resolving with percutaneous drainage  - Will plan diagnostic laparoscopy with laparoscopic abdominal washout and drain placement tomorrow  - NPO after midnigth    Signed By: González Cruz MD     2018

## 2018-04-11 NOTE — ANESTHESIA PROCEDURE NOTES
Peripheral Block    Start time: 4/11/2018 7:19 AM  End time: 4/11/2018 7:23 AM  Performed by: Osmel Mchugh  Authorized by: Osmel Mchugh       Pre-procedure:    Indications: post-op pain management and primary anesthetic    Preanesthetic Checklist: patient identified, risks and benefits discussed, site marked, timeout performed, anesthesia consent given and patient being monitored    Timeout Time: 07:19          Block Type:   Block Type:  Supraclavicular  Laterality:  Right  Monitoring:  Standard ASA monitoring, continuous pulse ox, frequent vital sign checks, heart rate, responsive to questions and oxygen  Injection Technique:  Single shot  Procedures: ultrasound guided and nerve stimulator    Patient Position: supine  Prep: betadine and povidone-iodine 7.5% surgical scrub    Location:  Supraclavicular  Needle Type:  Stimuplex  Needle Gauge:  22 G  Needle Localization:  Nerve stimulator and ultrasound guidance  Motor Response: minimal motor response >0.4 mA    Medication Injected:  0.5%  ropivacaine    Assessment:  Number of attempts:  1  Injection Assessment:  Incremental injection every 5 mL, local visualized surrounding nerve on ultrasound, negative aspiration for blood, no intravascular symptoms, negative aspiration for CSF, no paresthesia and ultrasound image on chart  Patient tolerance:  Patient tolerated the procedure well with no immediate complications

## 2018-04-11 NOTE — ROUTINE PROCESS
Bedside and Verbal shift change report given to Migue (oncoming nurse) by Ron Latham (offgoing nurse). Report included the following information SBAR, Kardex, ED Summary, Procedure Summary, Intake/Output, MAR, Accordion and Recent Results.

## 2018-04-11 NOTE — ANESTHESIA POSTPROCEDURE EVALUATION
Post-Anesthesia Evaluation and Assessment    Patient: Vandana Varghese MRN: [id number]  SSN: [id number]    YOB: 1985  Age: 29 y.o. Sex: female       Cardiovascular Function/Vital Signs  Visit Vitals    /90    Pulse 79    Temp 36.8 °C (98.2 °F)    Resp 13    Ht 5' 5\" (1.651 m)    Wt 73.6 kg (162 lb 4.8 oz)    SpO2 100%    Breastfeeding No    BMI 27.01 kg/m2       Patient is status post regional anesthesia for Procedure(s):  REVISION RIGHT ARM ARTERIO VENOUS GRAFT . Nausea/Vomiting: None    Postoperative hydration reviewed and adequate. Pain:  Pain Scale 1: FLACC (04/11/18 0940)  Pain Intensity 1: 9 (04/11/18 0248)   Managed    Neurological Status:   Neuro  Neurologic State: Sleeping;Eyes open to voice (04/11/18 0940)  Orientation Level: Oriented X4 (04/10/18 2129)  Cognition: Appropriate for age attention/concentration (04/10/18 1002)  Speech: Clear (04/10/18 1002)  LUE Motor Response: Purposeful (04/10/18 2129)  LLE Motor Response: Weak (04/10/18 2129)  RUE Motor Response: Purposeful (04/10/18 2129)  RLE Motor Response: Weak (04/10/18 2129)   At baseline    Mental Status and Level of Consciousness: Arousable    Pulmonary Status:   O2 Device: Nasal cannula (04/11/18 0940)   Adequate oxygenation and airway patent    Complications related to anesthesia: None    Post-anesthesia assessment completed.  No concerns    Signed By: Teri Faulkner MD     April 11, 2018

## 2018-04-11 NOTE — BRIEF OP NOTE
BRIEF OPERATIVE NOTE    Date of Procedure: 4/11/2018   Preoperative Diagnosis: ESRD  Postoperative Diagnosis: ESRD    Procedure(s):  REVISION RIGHT ARM ARTERIO VENOUS GRAFT   Surgeon(s) and Role:     * Cordelia Sun MD - Primary         Surgical Assistant:  Ester Tinsley    Surgical Staff:  Circ-1: Gilda Rodriguez  Scrub RN-1: Morgan Aguilar RN  Surg Asst-1: Stuart Steel  Event Time In   Incision Start 0809   Incision Close      Anesthesia: General   Estimated Blood Loss: minimal  Specimens: * No specimens in log *   Findings: 7mm propaten short jump graft to bypass venous anastomosis  Complications: none  Implants:   Implant Name Type Inv.  Item Serial No.  Lot No. LRB No. Used Action   GRAFT VASC TW [id number] -- [employer] - [id number]   GRAFT VASC TW [id number] -- [employer] [id number]  [employer] NA Right 1 Implanted

## 2018-04-11 NOTE — PROGRESS NOTES
Palliative Medicine Consult  [place]: [phone] ([phone])    Patient Name: Richy Prieto  YOB: 1985    Date of Initial Consult: 4/4/18  Reason for Consult: Care decisions  Requesting Provider: Dr. Pdero Macdonald  Primary Care Physician: Farrah Dominguez, KHANH     SUMMARY:   Richy Prieto is a 29 y.o. with a past history of SLE, end-stage renal disease (on hemodialysis, TTS), pulmonary embolism, on Eliquis (2012), hyperlipidemia, hypertension, recent Candida peritonitis, chronic bilateral pain, who was admitted on 3/11/2018 from home with a diagnosis of increasing abdominal pain. Current medical issues leading to Palliative Medicine involvement include: complicated hospital course with E'coli peritonitis, loculated ascites s/p drain placement, being followed by general surgery. We are asked to help with pain management. PALLIATIVE DIAGNOSES:   1. Generalized abdominal pain  2. Constipation  3. Other ascites  4. debility     PLAN:   1. Abdominal pain- lower abdominal pain, sharp, colicky. CT showing fluid collection. Plans for diagnostic laparoscopy with laparoscopic abdominal washout and drain placement tomorrow. Pt still requiring frequent dosing of dilaudid 0.5mg iv q 4 along with oxycodone ir 10mg. Pt likely to require iv meds post op. Will follow up tomorrow    2. Constipation- complains of irregular bowel movements. On senna 2/hs,  Miralax added. Will order suppository.          3. Communicated plan of care with: Palliative IDT       GOALS OF CARE / TREATMENT PREFERENCES:     GOALS OF CARE:  Patient/Health Care Proxy Stated Goals: Cure      TREATMENT PREFERENCES:   Code Status: Full Code    Advance Care Planning:  Advance Care Planning 4/4/2018   Patient's Healthcare Decision Maker is: Legal Next of Maria Antonia 70   Primary Decision Maker Name Angela Epstein   Primary Decision Maker Phone Number [phone]   Primary Decision Maker Relationship to Patient Parent   Confirm Advance Directive -   Patient Would Like to Complete Advance Directive -           Other Instructions: Other:    As far as possible, the palliative care team has discussed with patient / health care proxy about goals of care / treatment preferences for patient. HISTORY:         HPI/SUBJECTIVE:    The patient is:   [x] Verbal and participatory  [] Non-participatory due to:     Pleasant young woman, her aunt and cousin are in the room providing her company. She just finished working with PT, did well. Abdominal pain- no prior pain history. Has colicky abdominal pain that is relieved with IV dilaudid. Pain lasts for a few minutes only. Very intense when it comes on like gas pains. 4/9/18: pt verbalized that she is still having significant pain. Took oxycodone at 3am without relief. Noted tentative plans for abdominal surgery in 2 days if s/s do not improve. Relief mainly with iv dilaudid  4/11/18: no new complaints. Anticipating surgery tomorrow. Dialysis access de-clotted today. Family at the bedside.     Clinical Pain Assessment (nonverbal scale for severity on nonverbal patients):   Clinical Pain Assessment  Severity: 4  Location: abdomen  Character: sharp, colicky  Duration: weeks  Effect: functional  Factors: none  Frequency: on and off, intermittent          Duration: for how long has pt been experiencing pain (e.g., 2 days, 1 month, years)  Frequency: how often pain is an issue (e.g., several times per day, once every few days, constant)     FUNCTIONAL ASSESSMENT:     Palliative Performance Scale (PPS):  PPS: 80       PSYCHOSOCIAL/SPIRITUAL SCREENING:     Palliative IDT has assessed this patient for cultural preferences / practices and a referral made as appropriate to needs (Cultural Services, Patient Advocacy, Ethics, etc.)    Advance Care Planning:  Advance Care Planning 4/4/2018   Patient's Healthcare Decision Maker is: Legal Next of Maria Antonia 70   Primary Decision Maker Name Gavino Humphreys   Primary Decision Maker Phone Number [phone]   Primary Decision Maker Relationship to Patient Parent   Confirm Advance Directive -   Patient Would Like to Complete Advance Directive -       Any spiritual / Mandaen concerns:  [] Yes /  [x] No    Caregiver Burnout:  [] Yes /  [x] No /  [] No Caregiver Present      Anticipatory grief assessment:   [x] Normal  / [] Maladaptive       ESAS Anxiety: Anxiety: 0    ESAS Depression: Depression: 0        REVIEW OF SYSTEMS:     Positive and pertinent negative findings in ROS are noted above in HPI. The following systems were [x] reviewed / [] unable to be reviewed as noted in HPI  Other findings are noted below. Systems: constitutional, ears/nose/mouth/throat, respiratory, gastrointestinal, genitourinary, musculoskeletal, integumentary, neurologic, psychiatric, endocrine. Positive findings noted below. Modified ESAS Completed by: provider   Fatigue: 0 Drowsiness: 0   Depression: 0 Pain: 4   Anxiety: 0 Nausea: 0   Anorexia: 0 Dyspnea: 0     Constipation: Yes     Stool Occurrence(s): 1        PHYSICAL EXAM:     From RN flowsheet:  Wt Readings from Last 3 Encounters:   04/10/18 162 lb 4.8 oz (73.6 kg)   03/05/18 150 lb (68 kg)   01/29/18 153 lb (69.4 kg)     Blood pressure 119/86, pulse 87, temperature 98.7 °F (37.1 °C), resp. rate 16, height 5' 5\" (1.651 m), weight 162 lb 4.8 oz (73.6 kg), SpO2 94 %, not currently breastfeeding.     Pain Scale 1: FLACC  Pain Intensity 1: 9  Pain Onset 1: acute  Pain Location 1: Abdomen  Pain Orientation 1: Mid  Pain Description 1: Aching  Pain Intervention(s) 1: Medication (see MAR)  Last bowel movement, if known:     Constitutional: alert, oriented  Eyes: pupils equal, anicteric  ENMT: no nasal discharge, moist mucous membranes  Cardiovascular:   Respiratory: breathing not labored, symmetric  Gastrointestinal: soft, neg distention, pain is across lower abdomen  Musculoskeletal: no deformity, no tenderness to palpation  Skin: hair very thin on scalp, ?tinea capitus  Neurologic: following commands, moving all extremities  Psychiatric: full affect, no hallucinations  Other:       HISTORY:     Principal Problem:    Peritonitis ([address] (3/11/2018)    Active Problems:    Generalized abdominal pain (2018)      Other constipation (2018)      Other ascites (2018)      Past Medical History:   Diagnosis Date    Anemia     secondary to lupus    Asthma     no inhaler use in past 2 to 3 years    Carditis     Chronic kidney disease     ESRD    Chronic pain     DDD (degenerative disc disease), lumbar     ESRD (end stage renal disease) ([address]     GERD (gastroesophageal reflux disease)     Heart failure ([address]     Hemodialysis patient ([address] 2017    [address]  Tuesday,  Thursday,  and Saturday. 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  Poor historian 2018    With medications    Thromboembolus ([address] 2013    lungs    Transfusion history     Last Transfusion 2017  at [facility]      Past Surgical History:   Procedure Laterality Date    HX  SECTION  11/2006    x1    HX OTHER SURGICAL  9/16/15    INSERTION PD CATH; Removed 2017    HX VASCULAR ACCESS Right 2017    Double-Lumen henry catheter upper chest      Family History   Problem Relation Age of Onset    Diabetes Father     Hypertension Father     Cancer Other      aunt with breast cancer    Diabetes Mother       History reviewed, no pertinent family history.   Social History   Substance Use Topics    Smoking status: Never Smoker    Smokeless tobacco: Never Used    Alcohol use No     Allergies   Allergen Reactions    Compazine [Prochlorperazine Edisylate] Nausea and Vomiting and Palpitations      Current Facility-Administered Medications   Medication Dose Route Frequency    lactated Ringers infusion  125 mL/hr IntraVENous CONTINUOUS    0.9% sodium chloride infusion  25 mL/hr IntraVENous CONTINUOUS    sodium chloride (NS) flush 5-10 mL  5-10 mL IntraVENous Q8H    sodium chloride (NS) flush 5-10 mL  5-10 mL IntraVENous PRN    lidocaine (PF) (XYLOCAINE) 10 mg/mL (1 %) injection 0.1 mL  0.1 mL SubCUTAneous PRN    fentaNYL citrate (PF) injection 50 mcg  50 mcg IntraVENous PRN    midazolam (VERSED) injection 1 mg  1 mg IntraVENous PRN    midazolam (VERSED) injection 1 mg  1 mg IntraVENous PRN    ropivacaine (PF) (NAROPIN) 5 mg/mL (0.5 %) injection 150 mg  30 mL Peripheral Nerve Block ONCE    [START ON 4/12/2018] apixaban (ELIQUIS) tablet 5 mg  5 mg Oral BID    epoetin pia (EPOGEN;PROCRIT) injection 10,000 Units  10,000 Units SubCUTAneous DIALYSIS TUE, THU & SAT    lactulose (CHRONULAC) solution 10 g  10 g Oral PRN    HYDROmorphone (PF) (DILAUDID) injection 0.5 mg  0.5 mg IntraVENous Q4H PRN    oxyCODONE IR (ROXICODONE) tablet 10 mg  10 mg Oral Q6H PRN    polyethylene glycol (MIRALAX) packet 17 g  17 g Oral DAILY    senna (SENOKOT) tablet 17.2 mg  2 Tab Oral QHS    balsam peru-castor oil (VENELEX)  mg/gram ointment   Topical Q8H    amLODIPine (NORVASC) tablet 5 mg  5 mg Oral DAILY    carvedilol (COREG) tablet 12.5 mg  12.5 mg Oral BID WITH MEALS    dextrose 5% and 0.9% NaCl infusion  15 mL/hr IntraVENous CONTINUOUS    hydrALAZINE (APRESOLINE) 20 mg/mL injection 5 mg  5 mg IntraVENous Q6H PRN    0.9% sodium chloride infusion 250 mL  250 mL IntraVENous PRN    glucose chewable tablet 16 g  4 Tab Oral PRN    dextrose (D50W) injection syrg 12.5-25 g  12.5-25 g IntraVENous PRN    glucagon (GLUCAGEN) injection 1 mg  1 mg IntraMUSCular PRN    predniSONE (DELTASONE) tablet 5 mg  5 mg Oral DAILY    pantoprazole (PROTONIX) tablet 40 mg  40 mg Oral ACB    cyanocobalamin (VITAMIN B12) tablet 2,500 mcg  2,500 mcg Oral DAILY    hydroxychloroquine (PLAQUENIL) tablet 200 mg  200 mg Oral BID    albuterol (PROVENTIL VENTOLIN) nebulizer solution 2.5 mg  2.5 mg Nebulization Q4H PRN    gemfibrozil (LOPID) tablet 300 mg  300 mg Oral DAILY    amitriptyline (ELAVIL) tablet 25 mg  25 mg Oral QHS    sodium chloride (NS) flush 5-10 mL  5-10 mL IntraVENous Q8H    sodium chloride (NS) flush 5-10 mL  5-10 mL IntraVENous PRN    ondansetron (ZOFRAN) injection 4 mg  4 mg IntraVENous Q4H PRN    arformoterol (BROVANA) neb solution 15 mcg  15 mcg Nebulization BID RT    And    budesonide (PULMICORT) 500 mcg/2 ml nebulizer suspension  500 mcg Nebulization BID RT          LAB AND IMAGING FINDINGS:     Lab Results   Component Value Date/Time    WBC 4.4 04/11/2018 03:04 AM    HGB 8.7 (L) 04/11/2018 03:04 AM    PLATELET 331 59/96/4150 03:04 AM     Lab Results   Component Value Date/Time    Sodium 144 04/11/2018 03:04 AM    Potassium 5.1 04/11/2018 03:04 AM    Chloride 108 04/11/2018 03:04 AM    CO2 26 04/11/2018 03:04 AM    BUN 26 (H) 04/11/2018 03:04 AM    Creatinine 7.18 (H) 04/11/2018 03:04 AM    Calcium 8.4 (L) 04/11/2018 03:04 AM    Magnesium 1.9 04/11/2018 03:04 AM    Phosphorus 1.7 (L) 04/06/2018 02:46 AM      Lab Results   Component Value Date/Time    AST (SGOT) 16 04/09/2018 03:00 PM    Alk.  phosphatase 92 04/09/2018 03:00 PM    Protein, total 6.1 (L) 04/09/2018 03:00 PM    Albumin 1.3 (L) 04/09/2018 03:00 PM    Globulin 4.8 (H) 04/09/2018 03:00 PM     Lab Results   Component Value Date/Time    INR 1.3 (H) 03/25/2018 04:34 AM    Prothrombin time 13.5 (H) 03/25/2018 04:34 AM    aPTT 31.3 03/25/2018 04:34 AM      Lab Results   Component Value Date/Time    Iron 22 (L) 03/15/2018 10:32 AM    TIBC 52 (L) 03/15/2018 10:32 AM    Iron % saturation 42 03/15/2018 10:32 AM    Ferritin 548 (H) 03/15/2018 10:32 AM      No results found for: PH, PCO2, PO2  No components found for: [place]   Lab Results   Component Value Date/Time    CK 20 (L) 04/25/2013 05:20 PM    CK - MB <0.5 (L) 04/25/2013 05:20 PM                Total time: 25 minutes  Counseling / coordination time, spent as noted above:  20m  > 50% counseling / coordination?: y    Prolonged service was provided for  []30 min   []75 min in face to face time in the presence of the patient, spent as noted above. Time Start:   Time End:   Note: this can only be billed with 37877 (initial) or 30757 (follow up). If multiple start / stop times, list each separately.

## 2018-04-11 NOTE — OP NOTES
[facility] OP NOTE    Oscar Flores  MR#: [id number]  : 1985  ACCOUNT #: [de-identified]   DATE OF SERVICE: 2018    PREOPERATIVE DIAGNOSIS:  End-stage renal disease with venous anastomotic occlusion. POSTOPERATIVE DIAGNOSIS:  End-stage renal disease with venous anastomotic occlusion. PROCEDURE:  Right arm graft revision. SURGEON:  Edelmira Mackey MD    ASSISTANT:  Pratik Kent    ANESTHESIA:  Regional with sedation. ESTIMATED BLOOD LOSS:  Minimal.     SPECIMENS:  None. DRAINS:  None. COMPLICATIONS:  None. IMPLANTS:  7 mm Propaten PTFE graft. INDICATIONS FOR PROCEDURE:  The patient is a 42-year-old female with multiple medical problems who has an occlusion of the venous anastomosis of her right arm dialysis graft. She is dialyzing poorly. She presents for graft revision of the venous anastomosis. DESCRIPTION OF PROCEDURE:  The patient was placed supine on the operating table and the right arm and axilla were prepped and draped in the standard surgical fashion. Two longitudinal incisions were made on either side of the original incision. A portion of the venous limb of the graft was dissected out here along with the axillary vein, which is very proximal in the axilla. A 7 mm Propaten graft was soaked in antibiotic solution and then tunneled between the 2 incisions. The graft was then clamped to create a proximal anastomosis proximally and distally. This was transected here. The end closer to the venous anastomosis was ligated with 0 silk and an end-to-end anastomosis was made to the new graft with running 5-0 Prolene. Following this, the new venous anastomosis was created by clamping the vein proximally and distally, venotomy was made, and the new graft was sewn end-to-side to this vein using 5-0 Prolene in running fashion. Upon release of the clamps, there was a good thrill in the whole system.   Wounds were then thoroughly irrigated with antibiotic solution. Because of her anticoagulation, a small amount of Fibrillar and FloSeal were used, but hemostasis was excellent. The wound was then closed using 3-0 Vicryl in the deep tissue followed by staples in the skin. Dry sterile dressings were applied. The counts were correct at the end of the case x2. The patient was then awoken and transported to the recovery room in stable condition.       Freddy Yusuf MD AM / Ashlie Burrell  D: 04/11/2018 09:41     T: 04/11/2018 12:37  JOB #: [id number]  CC: Sanju Hughes MD  CC: Lelo Strickland MD

## 2018-04-11 NOTE — ROUTINE PROCESS
TRANSFER - IN REPORT:    Verbal report received from [facility] (name) on Perales McMinn  being received from PACU (unit) for routine progression of care      Report consisted of patients Situation, Background, Assessment and   Recommendations(SBAR). Information from the following report(s) SBAR, Kardex, Procedure Summary and Intake/Output was reviewed with the receiving nurse. Opportunity for questions and clarification was provided.

## 2018-04-12 ENCOUNTER — ANESTHESIA (OUTPATIENT)
Dept: SURGERY | Age: 33
DRG: 853 | End: 2018-04-12
Payer: MEDICARE

## 2018-04-12 ENCOUNTER — ANESTHESIA EVENT (OUTPATIENT)
Dept: SURGERY | Age: 33
DRG: 853 | End: 2018-04-12
Payer: MEDICARE

## 2018-04-12 LAB
GLUCOSE BLD STRIP.AUTO-MCNC: 111 MG/DL (ref 65–100)
GLUCOSE BLD STRIP.AUTO-MCNC: 112 MG/DL (ref 65–100)
GLUCOSE BLD STRIP.AUTO-MCNC: 139 MG/DL (ref 65–100)
GLUCOSE BLD STRIP.AUTO-MCNC: 177 MG/DL (ref 65–100)
GLUCOSE BLD STRIP.AUTO-MCNC: 59 MG/DL (ref 65–100)
GLUCOSE BLD STRIP.AUTO-MCNC: 68 MG/DL (ref 65–100)
GLUCOSE BLD STRIP.AUTO-MCNC: 69 MG/DL (ref 65–100)
GLUCOSE BLD STRIP.AUTO-MCNC: 75 MG/DL (ref 65–100)
SERVICE CMNT-IMP: ABNORMAL
SERVICE CMNT-IMP: NORMAL

## 2018-04-12 PROCEDURE — 76010000153 HC OR TIME 1.5 TO 2 HR: Performed by: SURGERY

## 2018-04-12 PROCEDURE — 77030031139 HC SUT VCRL2 J&J -A: Performed by: SURGERY

## 2018-04-12 PROCEDURE — 74011000250 HC RX REV CODE- 250

## 2018-04-12 PROCEDURE — 74011250637 HC RX REV CODE- 250/637: Performed by: HOSPITALIST

## 2018-04-12 PROCEDURE — 77030002916 HC SUT ETHLN J&J -A: Performed by: SURGERY

## 2018-04-12 PROCEDURE — 74011250636 HC RX REV CODE- 250/636: Performed by: INTERNAL MEDICINE

## 2018-04-12 PROCEDURE — 94664 DEMO&/EVAL PT USE INHALER: CPT

## 2018-04-12 PROCEDURE — 74011000250 HC RX REV CODE- 250: Performed by: SURGERY

## 2018-04-12 PROCEDURE — 74011250637 HC RX REV CODE- 250/637: Performed by: INTERNAL MEDICINE

## 2018-04-12 PROCEDURE — 74011250636 HC RX REV CODE- 250/636: Performed by: ANESTHESIOLOGY

## 2018-04-12 PROCEDURE — 77030018842 HC SOL IRR SOD CL 9% BAXT -A: Performed by: SURGERY

## 2018-04-12 PROCEDURE — 77030032490 HC SLV COMPR SCD KNE COVD -B: Performed by: SURGERY

## 2018-04-12 PROCEDURE — 74011250636 HC RX REV CODE- 250/636

## 2018-04-12 PROCEDURE — 77030002933 HC SUT MCRYL J&J -A: Performed by: SURGERY

## 2018-04-12 PROCEDURE — 77030008756 HC TU IRR SUC STRY -B: Performed by: SURGERY

## 2018-04-12 PROCEDURE — 77030035030 HC NDL INSUF VERES 150ML DISP COVD -B: Performed by: SURGERY

## 2018-04-12 PROCEDURE — 77030029684 HC NEB SM VOL KT MONA -A

## 2018-04-12 PROCEDURE — 0W9G40Z DRAINAGE OF PERITONEAL CAVITY WITH DRAINAGE DEVICE, PERCUTANEOUS ENDOSCOPIC APPROACH: ICD-10-PCS | Performed by: SURGERY

## 2018-04-12 PROCEDURE — 77030012407 HC DRN WND BARD -B: Performed by: SURGERY

## 2018-04-12 PROCEDURE — 77030020747 HC TU INSUF ENDOSC TELE -A: Performed by: SURGERY

## 2018-04-12 PROCEDURE — 65270000029 HC RM PRIVATE

## 2018-04-12 PROCEDURE — 77030037032 HC INSRT SCIS CLICKLLINE DISP STOR -B: Performed by: SURGERY

## 2018-04-12 PROCEDURE — 77030026438 HC STYL ET INTUB CARD -A: Performed by: ANESTHESIOLOGY

## 2018-04-12 PROCEDURE — 76210000017 HC OR PH I REC 1.5 TO 2 HR: Performed by: SURGERY

## 2018-04-12 PROCEDURE — 74011000250 HC RX REV CODE- 250: Performed by: HOSPITALIST

## 2018-04-12 PROCEDURE — 77030011640 HC PAD GRND REM COVD -A: Performed by: SURGERY

## 2018-04-12 PROCEDURE — 77030002996 HC SUT SLK J&J -A: Performed by: SURGERY

## 2018-04-12 PROCEDURE — 82962 GLUCOSE BLOOD TEST: CPT

## 2018-04-12 PROCEDURE — 77030008684 HC TU ET CUF COVD -B: Performed by: ANESTHESIOLOGY

## 2018-04-12 PROCEDURE — 77030013567 HC DRN WND RESERV BARD -A: Performed by: SURGERY

## 2018-04-12 PROCEDURE — 77030010507 HC ADH SKN DERMBND J&J -B: Performed by: SURGERY

## 2018-04-12 PROCEDURE — 77030020053 HC ELECTRD LAPSCP COVD -B: Performed by: SURGERY

## 2018-04-12 PROCEDURE — 74011000258 HC RX REV CODE- 258: Performed by: ANESTHESIOLOGY

## 2018-04-12 PROCEDURE — 74011000258 HC RX REV CODE- 258: Performed by: INTERNAL MEDICINE

## 2018-04-12 PROCEDURE — 77030018836 HC SOL IRR NACL ICUM -A: Performed by: SURGERY

## 2018-04-12 PROCEDURE — 74011250636 HC RX REV CODE- 250/636: Performed by: SURGERY

## 2018-04-12 PROCEDURE — 74011000258 HC RX REV CODE- 258: Performed by: SURGERY

## 2018-04-12 PROCEDURE — 77030035048 HC TRCR ENDOSC OPTCL COVD -B: Performed by: SURGERY

## 2018-04-12 PROCEDURE — 77010033678 HC OXYGEN DAILY

## 2018-04-12 PROCEDURE — 94640 AIRWAY INHALATION TREATMENT: CPT

## 2018-04-12 PROCEDURE — 76060000034 HC ANESTHESIA 1.5 TO 2 HR: Performed by: SURGERY

## 2018-04-12 PROCEDURE — 74011250637 HC RX REV CODE- 250/637: Performed by: NURSE PRACTITIONER

## 2018-04-12 PROCEDURE — 77030035051: Performed by: SURGERY

## 2018-04-12 PROCEDURE — 77030013079 HC BLNKT BAIR HGGR 3M -A: Performed by: ANESTHESIOLOGY

## 2018-04-12 RX ORDER — PROPOFOL 10 MG/ML
INJECTION, EMULSION INTRAVENOUS AS NEEDED
Status: DISCONTINUED | OUTPATIENT
Start: 2018-04-12 | End: 2018-04-12 | Stop reason: HOSPADM

## 2018-04-12 RX ORDER — HYDROMORPHONE HYDROCHLORIDE 1 MG/ML
INJECTION, SOLUTION INTRAMUSCULAR; INTRAVENOUS; SUBCUTANEOUS
Status: DISPENSED
Start: 2018-04-12 | End: 2018-04-13

## 2018-04-12 RX ORDER — FENTANYL CITRATE 50 UG/ML
INJECTION, SOLUTION INTRAMUSCULAR; INTRAVENOUS AS NEEDED
Status: DISCONTINUED | OUTPATIENT
Start: 2018-04-12 | End: 2018-04-12 | Stop reason: HOSPADM

## 2018-04-12 RX ORDER — HYDROMORPHONE HYDROCHLORIDE 2 MG/ML
1 INJECTION, SOLUTION INTRAMUSCULAR; INTRAVENOUS; SUBCUTANEOUS
Status: DISCONTINUED | OUTPATIENT
Start: 2018-04-12 | End: 2018-04-12

## 2018-04-12 RX ORDER — HYDROMORPHONE HYDROCHLORIDE 2 MG/ML
1 INJECTION, SOLUTION INTRAMUSCULAR; INTRAVENOUS; SUBCUTANEOUS ONCE
Status: COMPLETED | OUTPATIENT
Start: 2018-04-12 | End: 2018-04-12

## 2018-04-12 RX ORDER — BUPIVACAINE HYDROCHLORIDE 2.5 MG/ML
50 INJECTION, SOLUTION EPIDURAL; INFILTRATION; INTRACAUDAL ONCE
Status: COMPLETED | OUTPATIENT
Start: 2018-04-13 | End: 2018-04-12

## 2018-04-12 RX ORDER — SODIUM CHLORIDE, SODIUM LACTATE, POTASSIUM CHLORIDE, CALCIUM CHLORIDE 600; 310; 30; 20 MG/100ML; MG/100ML; MG/100ML; MG/100ML
INJECTION, SOLUTION INTRAVENOUS
Status: DISCONTINUED | OUTPATIENT
Start: 2018-04-12 | End: 2018-04-12 | Stop reason: HOSPADM

## 2018-04-12 RX ORDER — SUCCINYLCHOLINE CHLORIDE 20 MG/ML
INJECTION INTRAMUSCULAR; INTRAVENOUS AS NEEDED
Status: DISCONTINUED | OUTPATIENT
Start: 2018-04-12 | End: 2018-04-12 | Stop reason: HOSPADM

## 2018-04-12 RX ORDER — FENTANYL CITRATE 50 UG/ML
25 INJECTION, SOLUTION INTRAMUSCULAR; INTRAVENOUS
Status: DISCONTINUED | OUTPATIENT
Start: 2018-04-12 | End: 2018-04-12 | Stop reason: HOSPADM

## 2018-04-12 RX ORDER — SODIUM CHLORIDE 9 MG/ML
25 INJECTION, SOLUTION INTRAVENOUS CONTINUOUS
Status: DISCONTINUED | OUTPATIENT
Start: 2018-04-12 | End: 2018-04-12 | Stop reason: HOSPADM

## 2018-04-12 RX ORDER — LIDOCAINE HYDROCHLORIDE 20 MG/ML
INJECTION, SOLUTION EPIDURAL; INFILTRATION; INTRACAUDAL; PERINEURAL AS NEEDED
Status: DISCONTINUED | OUTPATIENT
Start: 2018-04-12 | End: 2018-04-12 | Stop reason: HOSPADM

## 2018-04-12 RX ORDER — HYDROMORPHONE HYDROCHLORIDE 2 MG/ML
1 INJECTION, SOLUTION INTRAMUSCULAR; INTRAVENOUS; SUBCUTANEOUS
Status: DISCONTINUED | OUTPATIENT
Start: 2018-04-12 | End: 2018-04-13

## 2018-04-12 RX ADMIN — OXYCODONE HYDROCHLORIDE 10 MG: 5 TABLET ORAL at 05:24

## 2018-04-12 RX ADMIN — ARFORMOTEROL TARTRATE 15 MCG: 15 SOLUTION RESPIRATORY (INHALATION) at 21:13

## 2018-04-12 RX ADMIN — HYDROMORPHONE HYDROCHLORIDE 0.5 MG: 2 INJECTION INTRAMUSCULAR; INTRAVENOUS; SUBCUTANEOUS at 12:57

## 2018-04-12 RX ADMIN — AMLODIPINE BESYLATE 5 MG: 5 TABLET ORAL at 08:39

## 2018-04-12 RX ADMIN — LIDOCAINE HYDROCHLORIDE 50 MG: 20 INJECTION, SOLUTION EPIDURAL; INFILTRATION; INTRACAUDAL; PERINEURAL at 10:09

## 2018-04-12 RX ADMIN — HYDROMORPHONE HYDROCHLORIDE 1 MG: 2 INJECTION, SOLUTION INTRAMUSCULAR; INTRAVENOUS; SUBCUTANEOUS at 18:46

## 2018-04-12 RX ADMIN — Medication 10 ML: at 22:58

## 2018-04-12 RX ADMIN — SUCCINYLCHOLINE CHLORIDE 100 MG: 20 INJECTION INTRAMUSCULAR; INTRAVENOUS at 10:11

## 2018-04-12 RX ADMIN — DEXTROSE MONOHYDRATE 12.5 G: 500 INJECTION PARENTERAL at 07:01

## 2018-04-12 RX ADMIN — FENTANYL CITRATE 25 MCG: 50 INJECTION, SOLUTION INTRAMUSCULAR; INTRAVENOUS at 12:17

## 2018-04-12 RX ADMIN — HYDROMORPHONE HYDROCHLORIDE 0.5 MG: 2 INJECTION, SOLUTION INTRAMUSCULAR; INTRAVENOUS; SUBCUTANEOUS at 00:41

## 2018-04-12 RX ADMIN — HYDROMORPHONE HYDROCHLORIDE 1 MG: 2 INJECTION, SOLUTION INTRAMUSCULAR; INTRAVENOUS; SUBCUTANEOUS at 17:28

## 2018-04-12 RX ADMIN — Medication 10 ML: at 14:11

## 2018-04-12 RX ADMIN — PROPOFOL 100 MG: 10 INJECTION, EMULSION INTRAVENOUS at 10:09

## 2018-04-12 RX ADMIN — CARVEDILOL 12.5 MG: 12.5 TABLET, FILM COATED ORAL at 08:39

## 2018-04-12 RX ADMIN — HYDROXYCHLOROQUINE SULFATE 200 MG: 200 TABLET, FILM COATED ORAL at 17:35

## 2018-04-12 RX ADMIN — PROPOFOL 50 MG: 10 INJECTION, EMULSION INTRAVENOUS at 10:10

## 2018-04-12 RX ADMIN — FENTANYL CITRATE 50 MCG: 50 INJECTION, SOLUTION INTRAMUSCULAR; INTRAVENOUS at 10:09

## 2018-04-12 RX ADMIN — HYDROMORPHONE HYDROCHLORIDE 0.5 MG: 2 INJECTION, SOLUTION INTRAMUSCULAR; INTRAVENOUS; SUBCUTANEOUS at 08:24

## 2018-04-12 RX ADMIN — FENTANYL CITRATE 25 MCG: 50 INJECTION, SOLUTION INTRAMUSCULAR; INTRAVENOUS at 12:35

## 2018-04-12 RX ADMIN — CASTOR OIL AND BALSAM, PERU: 788; 87 OINTMENT TOPICAL at 14:10

## 2018-04-12 RX ADMIN — PIPERACILLIN AND TAZOBACTAM 3.38 G: 3; .375 INJECTION, POWDER, FOR SOLUTION INTRAVENOUS at 10:18

## 2018-04-12 RX ADMIN — HYDROMORPHONE HYDROCHLORIDE 1 MG: 2 INJECTION, SOLUTION INTRAMUSCULAR; INTRAVENOUS; SUBCUTANEOUS at 14:07

## 2018-04-12 RX ADMIN — SODIUM CHLORIDE 25 ML/HR: 900 INJECTION, SOLUTION INTRAVENOUS at 09:06

## 2018-04-12 RX ADMIN — DEXTROSE MONOHYDRATE AND SODIUM CHLORIDE 15 ML/HR: 5; .9 INJECTION, SOLUTION INTRAVENOUS at 12:15

## 2018-04-12 RX ADMIN — SODIUM CHLORIDE, SODIUM LACTATE, POTASSIUM CHLORIDE, CALCIUM CHLORIDE: 600; 310; 30; 20 INJECTION, SOLUTION INTRAVENOUS at 10:16

## 2018-04-12 RX ADMIN — CARVEDILOL 12.5 MG: 12.5 TABLET, FILM COATED ORAL at 17:35

## 2018-04-12 RX ADMIN — DEXTROSE MONOHYDRATE 25 G: 500 INJECTION PARENTERAL at 22:52

## 2018-04-12 RX ADMIN — HYDROMORPHONE HYDROCHLORIDE 1 MG: 2 INJECTION, SOLUTION INTRAMUSCULAR; INTRAVENOUS; SUBCUTANEOUS at 20:46

## 2018-04-12 RX ADMIN — SENNOSIDES 17.2 MG: 8.6 TABLET, FILM COATED ORAL at 22:52

## 2018-04-12 RX ADMIN — FENTANYL CITRATE 25 MCG: 50 INJECTION, SOLUTION INTRAMUSCULAR; INTRAVENOUS at 12:30

## 2018-04-12 RX ADMIN — HYDROMORPHONE HYDROCHLORIDE 0.5 MG: 2 INJECTION, SOLUTION INTRAMUSCULAR; INTRAVENOUS; SUBCUTANEOUS at 04:30

## 2018-04-12 RX ADMIN — HYDROMORPHONE HYDROCHLORIDE 0.5 MG: 2 INJECTION INTRAMUSCULAR; INTRAVENOUS; SUBCUTANEOUS at 12:36

## 2018-04-12 RX ADMIN — PIPERACILLIN SODIUM,TAZOBACTAM SODIUM 3.38 G: 3; .375 INJECTION, POWDER, FOR SOLUTION INTRAVENOUS at 20:30

## 2018-04-12 RX ADMIN — AMITRIPTYLINE HYDROCHLORIDE 25 MG: 10 TABLET, FILM COATED ORAL at 22:52

## 2018-04-12 RX ADMIN — CASTOR OIL AND BALSAM, PERU: 788; 87 OINTMENT TOPICAL at 22:57

## 2018-04-12 RX ADMIN — FENTANYL CITRATE 25 MCG: 50 INJECTION, SOLUTION INTRAMUSCULAR; INTRAVENOUS at 12:08

## 2018-04-12 RX ADMIN — BUDESONIDE 500 MCG: 0.5 INHALANT RESPIRATORY (INHALATION) at 21:13

## 2018-04-12 NOTE — PROGRESS NOTES
Patient name: Areli Gao  MRN: [id number]    Nephrology Progress note:    Assessment:    ESRD: on HD TTS - [facility]     AVG - poor function    E. Coli Bacteremia: source intra-abd source/ E. Coli peritonitis. IV zosyn. Repeat Bld Cx 3/11-> Neg. Recent hx of Candida peritonitis. 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: Hgb remains below goal - on EPO    SHPT: Hypophosphatemia-> give Neutra-Phos    Protein malnutrition    Hypoglycemia: wean D5NS fluids as tolerated    Edema: 3rd spacing-> increase UF on HD    Plan:  HD in AM  Labs in AM  Subjective   Had abd surgery today   tolerated dialysis yesterday     Exam:  Visit Vitals    BP (!) 141/98 (BP 1 Location: Left arm, BP Patient Position: At rest)    Pulse 89    Temp 98.4 °F (36.9 °C)    Resp 14    Ht 5' 5\" (1.651 m)    Wt 71.3 kg (157 lb 3.2 oz)    SpO2 94%    Breastfeeding No    BMI 26.16 kg/m2     Wt Readings from Last 3 Encounters:   04/12/18 71.3 kg (157 lb 3.2 oz)   03/05/18 68 kg (150 lb)   01/29/18 69.4 kg (153 lb)       Intake/Output Summary (Last 24 hours) at 04/12/18 1347  Last data filed at 04/12/18 1300   Gross per 24 hour   Intake              150 ml   Output             3640 ml   Net            -3490 ml       NAD  CTAB/l  RUE/RLE edema  Abd drain, mild TTP      Labs/Data:    Lab Results   Component Value Date/Time    WBC 4.4 04/11/2018 03:04 AM    Hemoglobin (POC) 7.8 (L) 01/19/2018 12:30 PM    HGB 8.7 (L) 04/11/2018 03:04 AM    Hematocrit (POC) 23 (L) 01/19/2018 12:30 PM    HCT 29.9 (L) 04/11/2018 03:04 AM    PLATELET 053 08/75/9287 03:04 AM    MCV 92.0 04/11/2018 03:04 AM       Lab Results   Component Value Date/Time    Sodium 144 04/11/2018 03:04 AM    Potassium 5.1 04/11/2018 03:04 AM    Chloride 108 04/11/2018 03:04 AM    CO2 26 04/11/2018 03:04 AM    Anion gap 10 04/11/2018 03:04 AM    Glucose 81 04/11/2018 03:04 AM    BUN 26 (H) 04/11/2018 03:04 AM    Creatinine 7.18 (H) 04/11/2018 03:04 AM    BUN/Creatinine ratio 4 (L) 04/11/2018 03:04 AM    GFR est AA 8 (L) 04/11/2018 03:04 AM    GFR est non-AA 7 (L) 04/11/2018 03:04 AM    Calcium 8.4 (L) 04/11/2018 03:04 AM        Discussed with patient

## 2018-04-12 NOTE — PROGRESS NOTES
Vascular Surgery  --nice thrill in graft  --would continue to use  --I expect arm swelling to continue to resolve slowly over the next 2 weeks

## 2018-04-12 NOTE — ANESTHESIA POSTPROCEDURE EVALUATION
Post-Anesthesia Evaluation and Assessment    Patient: Jose Ruiz MRN: [id number]  SSN: [id number]    YOB: 1985  Age: 29 y.o. Sex: female       Cardiovascular Function/Vital Signs  Visit Vitals    BP (!) 134/96    Pulse 87    Temp 36.6 °C (97.9 °F)    Resp 17    Ht 5' 5\" (1.651 m)    Wt 71.3 kg (157 lb 3.2 oz)    SpO2 96%    Breastfeeding No    BMI 26.16 kg/m2       Patient is status post general anesthesia for Procedure(s):  DIAGNOSTIC LAPAROSCOPY WITH LAPAROSCOPIC ABDOMINAL WASH OUT . Nausea/Vomiting: None    Postoperative hydration reviewed and adequate. Pain:  Pain Scale 1: Numeric (0 - 10) (04/12/18 1228)  Pain Intensity 1: 9 (04/12/18 1228)   Managed    Neurological Status:   Neuro  Neurologic State: Alert (04/12/18 1229)  Orientation Level: Oriented X4 (04/12/18 1229)  Cognition: Follows commands (04/12/18 1229)  Speech: Clear (04/12/18 1229)  LUE Motor Response: Weak (04/12/18 1230)  LLE Motor Response: Weak (04/12/18 1229)  RUE Motor Response: Weak (04/12/18 1230)  RLE Motor Response: Weak (04/12/18 1229)   At baseline    Mental Status and Level of Consciousness: Arousable    Pulmonary Status:   O2 Device: Room air (04/12/18 1156)   Adequate oxygenation and airway patent    Complications related to anesthesia: None    Post-anesthesia assessment completed.  No concerns    Signed By: Lyndon Meyer MD     April 12, 2018

## 2018-04-12 NOTE — BRIEF OP NOTE
BRIEF OPERATIVE NOTE    Date of Procedure: 4/12/2018   Preoperative Diagnosis: SUB ACUTE ABDOMINAL PERITINITIS WITH ABSCESS  Postoperative Diagnosis: SUB ACUTE ABDOMINAL PERITINITIS WITH ABSCESS    Procedure(s):  DIAGNOSTIC LAPAROSCOPY WITH LAPAROSCOPIC ABDOMINAL 8 Jime Dylon Hinojosa OUT   Surgeon(s) and Role:     * Margaret Barrera MD - Primary         Surgical Assistant: Grover Rao    Surgical Staff:  Circ-1: Ruth Mckeon RN  Circ-Relief: Kathrin Opitz, RN  Scrub Tech-1: Chauncey Martines  Scrub RN-Relief: Paulina Jones RN  Surg Asst-1: Alecia Agrawal  Surg Asst-Relief: Chelsie Calvin  Event Time In   Incision Start 1033   Incision Close 1136     Anesthesia: General   Estimated Blood Loss: Minimal  Specimens: * No specimens in log *   Findings: There was significant adhesions and fibrinous exudate with abscess in the peritoneal cavity.     Complications: None  Implants: * No implants in log *

## 2018-04-12 NOTE — DIABETES MGMT
DTC Progress Note    Recommendations/ Comments: Chart reviewed d/t hypoglycemia, BG 59 mg/dL @2246 last night and 75 mg/dL this am.  If appropriate, please consider:\   - adding D5 to current IV fluid order    Current hospital DM medication: None    Chart reviewed on Carmen Re. Patient is a 29 y.o. female with no history of diabetes. A1c:   Lab Results   Component Value Date/Time    Hemoglobin A1c 5.4 09/25/2015 02:02 PM    Hemoglobin A1c 5.3 03/07/2014 02:16 PM       Recent Glucose Results:   Lab Results   Component Value Date/Time    GLUCPOC 177 (H) 04/12/2018 07:06 AM    GLUCPOC 75 04/12/2018 07:00 AM    GLUCPOC 139 (H) 04/11/2018 11:01 PM        Lab Results   Component Value Date/Time    Creatinine 7.18 (H) 04/11/2018 03:04 AM     Estimated Creatinine Clearance: 11.1 mL/min (based on Cr of 7.18). Active Orders   Diet    DIET NPO With Meds        PO intake: No data found. Will continue to follow as needed.     Thank you  Dee Dee Belle, MS, RN, CDE

## 2018-04-12 NOTE — ROUTINE PROCESS
TRANSFER - IN REPORT:    Verbal report received from [facility] on Leslye  being received from [facility](unit) for ordered procedure      Report consisted of patients Situation, Background, Assessment and   Recommendations(SBAR). Information from the following report(s) SBAR was reviewed with the receiving nurse. Opportunity for questions and clarification was provided. Assessment completed upon patients arrival to unit and care assumed.

## 2018-04-12 NOTE — PERIOP NOTES
TRANSFER - OUT REPORT:    Verbal report given to Nayana RN on Angella Jimenez  being transferred to room 543 for routine post - op       Report consisted of patients Situation, Background, Assessment and   Recommendations(SBAR). Time Pre op antibiotic given:zosyn at 7524  Anesthesia Stop time: 4057  Franco Present on Transfer to floor:no  Order for Franco on Chart:no  Discharge Prescriptions with Chart:no    Information from the following report(s) SBAR, OR Summary, Intake/Output and MAR was reviewed with the receiving nurse. Opportunity for questions and clarification was provided. Is the patient on 02? YES       L/Min 2       Other     Is the patient on a monitor? NO    Is the nurse transporting with the patient? NO    Surgical Waiting Area notified of patient's transfer from PACU? YES      The following personal items collected during your admission accompanied patient upon transfer:   Dental Appliance: Dental Appliances: None  Vision: Visual Aid: None  Hearing Aid:    Jewelry: Jewelry: None  Clothing: Clothing:  (cell phone to security)  Other Valuables: Other Valuables:  At bedside, Cell Phone, Purse  Valuables sent to safe:

## 2018-04-12 NOTE — PROGRESS NOTES
TRANSFER - IN REPORT:    Verbal report received from Svetlana [facility] (name) on Angella Jimenez  being received from PACU (unit) for routine post - op      Report consisted of patients Situation, Background, Assessment and   Recommendations(SBAR). Information from the following report(s) SBAR, Kardex, OR Summary and MAR was reviewed with the receiving nurse. Opportunity for questions and clarification was provided. Assessment completed upon patients arrival to unit and care assumed. Bedside shift change report given to Alexander Burns (oncoming nurse) by Gabino Clarke (offgoing nurse). Report included the following information SBAR, Kardex, OR Summary and MAR.

## 2018-04-12 NOTE — ANESTHESIA PREPROCEDURE EVALUATION
Anesthetic History     PONV          Review of Systems / Medical History  Patient summary reviewed, nursing notes reviewed and pertinent labs reviewed    Pulmonary  Within defined limits                 Neuro/Psych   Within defined limits           Cardiovascular    Hypertension: well controlled                   GI/Hepatic/Renal  Within defined limits              Endo/Other  Within defined limits           Other Findings              Physical Exam    Airway  Mallampati: II  TM Distance: > 6 cm  Neck ROM: normal range of motion   Mouth opening: Normal     Cardiovascular  Regular rate and rhythm,  S1 and S2 normal,  no murmur, click, rub, or gallop             Dental  No notable dental hx       Pulmonary  Breath sounds clear to auscultation               Abdominal  GI exam deferred       Other Findings            Anesthetic Plan    ASA: 3  Anesthesia type: general          Induction: Intravenous  Anesthetic plan and risks discussed with: Patient

## 2018-04-12 NOTE — PROGRESS NOTES
Hospitalist Progress Note            This is a 67-year-old female with multiple medical problems including SLE, end-stage renal disease (on hemodialysis, TTS), pulmonary embolism, on Eliquis (2012), hyperlipidemia, hypertension, recent Candida peritonitis, chronic bilateral pain.  She comes over here because of abdominal pain. Daily Progress Note: 4/12/2018    Assessment/Plan:   Acute peritonitis (e. coli) with sepsis  - also with recent candida peritonitis  - CT abd 3/11 Large amount of free intraperitoneal fluid appears somewhat loculated  - Peritoneal fluid heavy E coli on 3/11 and 3/18  - has had multiple CT scans showing interval changes  - drain exchanged and upsized by IR on 4/5  - completed IV abx on 4/6/18  - repeat CT scan 2 days ago with residual contents likely not amenable to perc. drainage alone  - went to OR today for wash-out       HTN  -continue current meds     Thrombocytopenia   -resolved      Bilateral LE edema  -likely third-spacing  -Dopplers negative for DVT      SLE  -continue home meds  -D/C'd Imuran, allopurinol by Nephro with acute infection and thrombocytopenia      History of PE  - on Eliquis since 2012  - per documentation from prior attending: \"appreciate discussion with PMD 3/13. The patient was seen by hematology in 2013 but seems was lost to follow up. I think at that time the plan was to stop anticoagulation if her SLE is stable. Spoke to Dr Jose A Garduno, he feels that the patient can come off Eliquis. His records do not suggest any recurrence of DVT/PE.  Spoke to Dr Annabel Cox, he has not seen the patient since >1 yr. If antiphospholipid antibodies negative then the patient can come off Eliquis\"  - 10/17 V/Q high probability for PE  - Per Oncology the patient should be continued to Eliquis indefinitely      Hypophosphatemia:  -replete prn     ESRD  -on HD TTS  -RUE AVG was malfunctioning, s/p OR this AM     Anemia of chronic disease  -Transfused 2 units PRBC with HD 3/15     Hepatic steatosis    Severe protein calorie malnutrition  -consult to nutrition    Constipation:  -bowel regimen      Full code  Eliquis    Discussed with patient/family  Dispo: TBD       Subjective:     Patient seen after she went to the OR    Review of Systems:   As above    Objective:     Visit Vitals    BP (!) 143/104    Pulse 95    Temp 99.2 °F (37.3 °C) (Oral)    Resp 16    Ht 5' 5\" (1.651 m)    Wt 71.3 kg (157 lb 3.2 oz)    SpO2 94%    Breastfeeding No    BMI 26.16 kg/m2    O2 Flow Rate (L/min): 2 l/min O2 Device: Nasal cannula    Temp (24hrs), Av.5 °F (36.9 °C), Min:97.5 °F (36.4 °C), Max:99.2 °F (37.3 °C)         04/10 1901 -  0700  In: 275 [I.V.:275]  Out: 0034 [Drains:150]    EXAM:  General: Chronically-ill appearing, NAD    HEENT: Anicteric sclerae, MMM  Neck:   Supple, trachea midline  Lungs:  CTA bilaterally. No Wheezing  Heart:  Regular rhythm,  No murmur   Abdomen: Dressing c/d/i. Tender.  Drain with serosanguineous drainage  Extremities: Warm, +peripheral edema  Neurologic:  normal speech, moves all extremities       Data Review:     Recent Results (from the past 24 hour(s))   GLUCOSE, POC    Collection Time: 18 11:34 AM   Result Value Ref Range    Glucose (POC) 65 65 - 100 mg/dL    Performed by [employer]    GLUCOSE, POC    Collection Time: 18 12:21 PM   Result Value Ref Range    Glucose (POC) 73 65 - 100 mg/dL    Performed by [employer]    GLUCOSE, POC    Collection Time: 18 12:48 PM   Result Value Ref Range    Glucose (POC) 78 65 - 100 mg/dL    Performed by [address], POC    Collection Time: 18  1:22 PM   Result Value Ref Range    Glucose (POC) 81 65 - 100 mg/dL    Performed by [address], POC    Collection Time: 18  4:40 PM   Result Value Ref Range    Glucose (POC) 71 65 - 100 mg/dL    Performed by [id number], POC    Collection Time: 18  5:07 PM   Result Value Ref Range Glucose (POC) 91 65 - 100 mg/dL    Performed by Hayley Fox    GLUCOSE, POC    Collection Time: 04/11/18 10:46 PM   Result Value Ref Range    Glucose (POC) 59 (L) 65 - 100 mg/dL    Performed by [address], POC    Collection Time: 04/11/18 11:01 PM   Result Value Ref Range    Glucose (POC) 139 (H) 65 - 100 mg/dL    Performed by Chuy [address]ail[address], POC    Collection Time: 04/12/18  7:00 AM   Result Value Ref Range    Glucose (POC) 75 65 - 100 mg/dL    Performed by [address], POC    Collection Time: 04/12/18  7:06 AM   Result Value Ref Range    Glucose (POC) 177 (H) 65 - 100 mg/dL    Performed by Eliza Sin        Principal Problem:    Peritonitis ([address] (3/11/2018)    Active Problems:    Generalized abdominal pain (4/4/2018)      Other constipation (4/4/2018)      Other ascites (4/4/2018)        Medications reviewed  Current Facility-Administered Medications   Medication Dose Route Frequency    lactated Ringers infusion  125 mL/hr IntraVENous CONTINUOUS    0.9% sodium chloride infusion  25 mL/hr IntraVENous CONTINUOUS    sodium chloride (NS) flush 5-10 mL  5-10 mL IntraVENous Q8H    sodium chloride (NS) flush 5-10 mL  5-10 mL IntraVENous PRN    lidocaine (PF) (XYLOCAINE) 10 mg/mL (1 %) injection 0.1 mL  0.1 mL SubCUTAneous PRN    fentaNYL citrate (PF) injection 50 mcg  50 mcg IntraVENous PRN    midazolam (VERSED) injection 1 mg  1 mg IntraVENous PRN    midazolam (VERSED) injection 1 mg  1 mg IntraVENous PRN    apixaban (ELIQUIS) tablet 5 mg  5 mg Oral BID    epoetin pia (EPOGEN;PROCRIT) injection 10,000 Units  10,000 Units SubCUTAneous DIALYSIS TUE, THU & SAT    lactulose (CHRONULAC) solution 10 g  10 g Oral PRN    HYDROmorphone (PF) (DILAUDID) injection 0.5 mg  0.5 mg IntraVENous Q4H PRN    oxyCODONE IR (ROXICODONE) tablet 10 mg  10 mg Oral Q6H PRN    polyethylene glycol (MIRALAX) packet 17 g  17 g Oral DAILY    senna (SENOKOT) tablet 17.2 mg  2 Tab Oral QHS    balsam peru-castor oil (VENELEX)  mg/gram ointment   Topical Q8H    amLODIPine (NORVASC) tablet 5 mg  5 mg Oral DAILY    carvedilol (COREG) tablet 12.5 mg  12.5 mg Oral BID WITH MEALS    dextrose 5% and 0.9% NaCl infusion  15 mL/hr IntraVENous CONTINUOUS    hydrALAZINE (APRESOLINE) 20 mg/mL injection 5 mg  5 mg IntraVENous Q6H PRN    0.9% sodium chloride infusion 250 mL  250 mL IntraVENous PRN    glucose chewable tablet 16 g  4 Tab Oral PRN    dextrose (D50W) injection syrg 12.5-25 g  12.5-25 g IntraVENous PRN    glucagon (GLUCAGEN) injection 1 mg  1 mg IntraMUSCular PRN    predniSONE (DELTASONE) tablet 5 mg  5 mg Oral DAILY    pantoprazole (PROTONIX) tablet 40 mg  40 mg Oral ACB    cyanocobalamin (VITAMIN B12) tablet 2,500 mcg  2,500 mcg Oral DAILY    hydroxychloroquine (PLAQUENIL) tablet 200 mg  200 mg Oral BID    albuterol (PROVENTIL VENTOLIN) nebulizer solution 2.5 mg  2.5 mg Nebulization Q4H PRN    gemfibrozil (LOPID) tablet 300 mg  300 mg Oral DAILY    amitriptyline (ELAVIL) tablet 25 mg  25 mg Oral QHS    sodium chloride (NS) flush 5-10 mL  5-10 mL IntraVENous Q8H    sodium chloride (NS) flush 5-10 mL  5-10 mL IntraVENous PRN    ondansetron (ZOFRAN) injection 4 mg  4 mg IntraVENous Q4H PRN    arformoterol (BROVANA) neb solution 15 mcg  15 mcg Nebulization BID RT    And    budesonide (PULMICORT) 500 mcg/2 ml nebulizer suspension  500 mcg Nebulization BID RT       Michael Layne MD

## 2018-04-12 NOTE — ROUTINE PROCESS
Patient: Bautista Van MRN: [id number]  SSN: [id number]   YOB: 1985  Age: 29 y.o. Sex: female     Patient is status post Procedure(s):  DIAGNOSTIC LAPAROSCOPY WITH LAPAROSCOPIC ABDOMINAL [address] OUT . Surgeon(s) and Role:     * Rudy Torres MD - Primary    Local/Dose/Irrigation: see mar                Peripheral IV 04/05/18 Left Forearm (Active)   Site Assessment Clean, dry, & intact 4/12/2018  8:15 AM   Phlebitis Assessment 0 4/12/2018  8:15 AM   Infiltration Assessment 0 4/12/2018  8:15 AM   Dressing Status Clean, dry, & intact 4/12/2018  8:15 AM   Dressing Type Transparent 4/12/2018  8:15 AM   Hub Color/Line Status Infusing;Patent 4/12/2018  8:15 AM   Action Taken Open ports on tubing capped 4/12/2018  8:15 AM   Alcohol Cap Used Yes 4/12/2018  8:15 AM       Peripheral IV 04/12/18 Left  (Active)          Simone-Feng Drain 04/12/18 Right; Lower Abdomen (Active)   Site Assessment Intact 4/12/2018 11:09 AM   Dressing Status Other (comment) 4/12/2018 11:09 AM   Status Other (comment) 4/12/2018 11:09 AM   Drainage Color Other(Comment) 4/12/2018 11:09 AM       Simone-Feng Drain 04/12/18 Left; Lower Abdomen (Active)   Site Assessment Intact 4/12/2018 11:25 AM   Dressing Status Other (comment) 4/12/2018 11:25 AM   Status Other (comment) 4/12/2018 11:25 AM   Drainage Color Other(Comment) 4/12/2018 11:25 AM      Airway - Endotracheal Tube 04/12/18 Oral (Active)                   Dressing/Packing:  Wound Sacral/coccyx-DRESSING TYPE: Open to air (04/12/18 0815)  Wound Arm Right-DRESSING TYPE: Dry dressing (04/12/18 0815)  Wound Abdomen Anterior-DRESSING TYPE: Topical skin adhesive/glue (04/12/18 1100)  Splint/Cast:  ]    Other:  150 ml of serosanginous fluid drained from drains combined.  4x4's and tape placed over both drains

## 2018-04-12 NOTE — PROGRESS NOTES
Physical Therapy Note:    PT attempted to see patient earlier this morning but in the OR for abdominal washout. Returned to the floor this pm and cleared for OOB activity by RN. However, patient very groggy upon PT entering the room, stated she was in a lot of pain and politely declined therapy session for today. Requested PT return tomorrow, will return as appropriate in the morning.      Michael Coffey MS, PT

## 2018-04-12 NOTE — WOUND CARE
Wound Care Note:     Follow-up visit for sacrum    Chart shows:  Admitted for peritonitis with a history of anemia secondary to lupus, asthma, carditis, chronic kidney disease- ESRD, chronic pain, degenerative disc disease, GERD, heart failure, hemodialysis patient, hypercholesterolemia, HTN, intractable nausea and vomiting, long term use of anticoagulants, lupus, malignant hypertension with chronic kidney disease stage V, and thromboembolus.      WBC = 4.4 on 4/11/18  Admitted from home    Assessment:   Patient is A&O x 4, communicative, continent with no assistance needed in repositioning. Bed: [facility]  Diet: Currently NPO for procedure  Patient reports pain in arm from procedure  yesterday; no number given    Bilateral heels, buttocks, and sacral skin intact and without erythema. 1. Sacrum intact, pink epitheliazed tissue  Venelex applied. Patient being taken down for diagnostic lapraocopy with laparoscopic abdominal washout and drain placement. Recommendations:    Continue using Venelex    Sacrum- Every 8 hours apply Venelex ointment.      Moisturize dry skin with [place]. Skin Care & Pressure Prevention:  Minimize layers of linen/pads under patient to optimize support surface. Turn/reposition approximately every 2 hours and offload heels.   Promote continence     Discussed above plan with patient & Elena Grace RN    Transition of Care: Plan to follow as needed while admitted to hospital.    ENRIQUE HancockN, RN, [facility], [facility].  office [phone]  pager [phone] or call  to page

## 2018-04-12 NOTE — ROUTINE PROCESS
Primary Nurse Dayanna Desir and DANITZA Kraus performed a dual skin assessment on this patient Impairment noted- see wound doc flow sheet  Vikash score is 18.

## 2018-04-13 LAB
ANION GAP SERPL CALC-SCNC: 7 MMOL/L (ref 5–15)
BUN SERPL-MCNC: 19 MG/DL (ref 6–20)
BUN/CREAT SERPL: 3 (ref 12–20)
CALCIUM SERPL-MCNC: 7.8 MG/DL (ref 8.5–10.1)
CHLORIDE SERPL-SCNC: 106 MMOL/L (ref 97–108)
CO2 SERPL-SCNC: 28 MMOL/L (ref 21–32)
CREAT SERPL-MCNC: 5.43 MG/DL (ref 0.55–1.02)
ERYTHROCYTE [DISTWIDTH] IN BLOOD BY AUTOMATED COUNT: 22.3 % (ref 11.5–14.5)
GLUCOSE BLD STRIP.AUTO-MCNC: 73 MG/DL (ref 65–100)
GLUCOSE BLD STRIP.AUTO-MCNC: 75 MG/DL (ref 65–100)
GLUCOSE BLD STRIP.AUTO-MCNC: 78 MG/DL (ref 65–100)
GLUCOSE BLD STRIP.AUTO-MCNC: 83 MG/DL (ref 65–100)
GLUCOSE BLD STRIP.AUTO-MCNC: 87 MG/DL (ref 65–100)
GLUCOSE BLD STRIP.AUTO-MCNC: 94 MG/DL (ref 65–100)
GLUCOSE BLD STRIP.AUTO-MCNC: 99 MG/DL (ref 65–100)
GLUCOSE SERPL-MCNC: 78 MG/DL (ref 65–100)
HCT VFR BLD AUTO: 27.2 % (ref 35–47)
HGB BLD-MCNC: 8 G/DL (ref 11.5–16)
MCH RBC QN AUTO: 27.6 PG (ref 26–34)
MCHC RBC AUTO-ENTMCNC: 29.4 G/DL (ref 30–36.5)
MCV RBC AUTO: 93.8 FL (ref 80–99)
NRBC # BLD: 0.03 K/UL (ref 0–0.01)
NRBC BLD-RTO: 0.5 PER 100 WBC
PHOSPHATE SERPL-MCNC: 3.4 MG/DL (ref 2.6–4.7)
PLATELET # BLD AUTO: 284 K/UL (ref 150–400)
PMV BLD AUTO: 10.5 FL (ref 8.9–12.9)
POTASSIUM SERPL-SCNC: 4.7 MMOL/L (ref 3.5–5.1)
RBC # BLD AUTO: 2.9 M/UL (ref 3.8–5.2)
SERVICE CMNT-IMP: NORMAL
SODIUM SERPL-SCNC: 141 MMOL/L (ref 136–145)
WBC # BLD AUTO: 6.3 K/UL (ref 3.6–11)

## 2018-04-13 PROCEDURE — 74011250637 HC RX REV CODE- 250/637: Performed by: INTERNAL MEDICINE

## 2018-04-13 PROCEDURE — 94640 AIRWAY INHALATION TREATMENT: CPT

## 2018-04-13 PROCEDURE — 85027 COMPLETE CBC AUTOMATED: CPT | Performed by: INTERNAL MEDICINE

## 2018-04-13 PROCEDURE — 74011250636 HC RX REV CODE- 250/636: Performed by: INTERNAL MEDICINE

## 2018-04-13 PROCEDURE — 74011636637 HC RX REV CODE- 636/637: Performed by: HOSPITALIST

## 2018-04-13 PROCEDURE — 74011250636 HC RX REV CODE- 250/636: Performed by: SURGERY

## 2018-04-13 PROCEDURE — 65270000029 HC RM PRIVATE

## 2018-04-13 PROCEDURE — 84100 ASSAY OF PHOSPHORUS: CPT | Performed by: INTERNAL MEDICINE

## 2018-04-13 PROCEDURE — 80048 BASIC METABOLIC PNL TOTAL CA: CPT | Performed by: INTERNAL MEDICINE

## 2018-04-13 PROCEDURE — 74011250637 HC RX REV CODE- 250/637: Performed by: HOSPITALIST

## 2018-04-13 PROCEDURE — 74011000258 HC RX REV CODE- 258: Performed by: SURGERY

## 2018-04-13 PROCEDURE — 74011000250 HC RX REV CODE- 250: Performed by: HOSPITALIST

## 2018-04-13 PROCEDURE — 74011250637 HC RX REV CODE- 250/637: Performed by: NURSE PRACTITIONER

## 2018-04-13 PROCEDURE — 74011250637 HC RX REV CODE- 250/637: Performed by: SURGERY

## 2018-04-13 PROCEDURE — 82962 GLUCOSE BLOOD TEST: CPT

## 2018-04-13 PROCEDURE — 36415 COLL VENOUS BLD VENIPUNCTURE: CPT | Performed by: INTERNAL MEDICINE

## 2018-04-13 RX ORDER — HYDROMORPHONE HYDROCHLORIDE 2 MG/ML
1 INJECTION, SOLUTION INTRAMUSCULAR; INTRAVENOUS; SUBCUTANEOUS
Status: DISCONTINUED | OUTPATIENT
Start: 2018-04-13 | End: 2018-04-14

## 2018-04-13 RX ADMIN — HYDROMORPHONE HYDROCHLORIDE 1 MG: 2 INJECTION, SOLUTION INTRAMUSCULAR; INTRAVENOUS; SUBCUTANEOUS at 14:46

## 2018-04-13 RX ADMIN — PANTOPRAZOLE SODIUM 40 MG: 40 TABLET, DELAYED RELEASE ORAL at 07:04

## 2018-04-13 RX ADMIN — HYDROMORPHONE HYDROCHLORIDE 1 MG: 2 INJECTION, SOLUTION INTRAMUSCULAR; INTRAVENOUS; SUBCUTANEOUS at 03:44

## 2018-04-13 RX ADMIN — POLYETHYLENE GLYCOL 3350 17 G: 17 POWDER, FOR SOLUTION ORAL at 08:09

## 2018-04-13 RX ADMIN — APIXABAN 5 MG: 5 TABLET, FILM COATED ORAL at 17:20

## 2018-04-13 RX ADMIN — PREDNISONE 5 MG: 5 TABLET ORAL at 08:10

## 2018-04-13 RX ADMIN — HYDROMORPHONE HYDROCHLORIDE 1 MG: 2 INJECTION, SOLUTION INTRAMUSCULAR; INTRAVENOUS; SUBCUTANEOUS at 22:26

## 2018-04-13 RX ADMIN — ARFORMOTEROL TARTRATE 15 MCG: 15 SOLUTION RESPIRATORY (INHALATION) at 20:53

## 2018-04-13 RX ADMIN — CASTOR OIL AND BALSAM, PERU: 788; 87 OINTMENT TOPICAL at 22:29

## 2018-04-13 RX ADMIN — APIXABAN 5 MG: 5 TABLET, FILM COATED ORAL at 08:09

## 2018-04-13 RX ADMIN — BUDESONIDE 500 MCG: 0.5 INHALANT RESPIRATORY (INHALATION) at 20:53

## 2018-04-13 RX ADMIN — HYDROMORPHONE HYDROCHLORIDE 1 MG: 2 INJECTION, SOLUTION INTRAMUSCULAR; INTRAVENOUS; SUBCUTANEOUS at 12:26

## 2018-04-13 RX ADMIN — Medication 10 ML: at 17:21

## 2018-04-13 RX ADMIN — Medication 2500 MCG: at 08:10

## 2018-04-13 RX ADMIN — CASTOR OIL AND BALSAM, PERU: 788; 87 OINTMENT TOPICAL at 07:04

## 2018-04-13 RX ADMIN — GEMFIBROZIL 300 MG: 600 TABLET ORAL at 08:09

## 2018-04-13 RX ADMIN — AMLODIPINE BESYLATE 5 MG: 5 TABLET ORAL at 08:09

## 2018-04-13 RX ADMIN — CARVEDILOL 12.5 MG: 12.5 TABLET, FILM COATED ORAL at 17:20

## 2018-04-13 RX ADMIN — HYDROMORPHONE HYDROCHLORIDE 1 MG: 2 INJECTION, SOLUTION INTRAMUSCULAR; INTRAVENOUS; SUBCUTANEOUS at 10:34

## 2018-04-13 RX ADMIN — HYDROMORPHONE HYDROCHLORIDE 1 MG: 2 INJECTION, SOLUTION INTRAMUSCULAR; INTRAVENOUS; SUBCUTANEOUS at 07:05

## 2018-04-13 RX ADMIN — PIPERACILLIN SODIUM,TAZOBACTAM SODIUM 3.38 G: 3; .375 INJECTION, POWDER, FOR SOLUTION INTRAVENOUS at 08:10

## 2018-04-13 RX ADMIN — HYDROXYCHLOROQUINE SULFATE 200 MG: 200 TABLET, FILM COATED ORAL at 08:10

## 2018-04-13 RX ADMIN — BUDESONIDE 500 MCG: 0.5 INHALANT RESPIRATORY (INHALATION) at 09:40

## 2018-04-13 RX ADMIN — Medication 10 ML: at 07:05

## 2018-04-13 RX ADMIN — PIPERACILLIN SODIUM,TAZOBACTAM SODIUM 3.38 G: 3; .375 INJECTION, POWDER, FOR SOLUTION INTRAVENOUS at 20:25

## 2018-04-13 RX ADMIN — ARFORMOTEROL TARTRATE 15 MCG: 15 SOLUTION RESPIRATORY (INHALATION) at 09:40

## 2018-04-13 RX ADMIN — Medication 10 ML: at 17:22

## 2018-04-13 RX ADMIN — HYDROXYCHLOROQUINE SULFATE 200 MG: 200 TABLET, FILM COATED ORAL at 17:20

## 2018-04-13 RX ADMIN — HYDROMORPHONE HYDROCHLORIDE 1 MG: 2 INJECTION, SOLUTION INTRAMUSCULAR; INTRAVENOUS; SUBCUTANEOUS at 20:26

## 2018-04-13 RX ADMIN — Medication 10 ML: at 22:27

## 2018-04-13 RX ADMIN — Medication 10 ML: at 07:04

## 2018-04-13 RX ADMIN — HYDROMORPHONE HYDROCHLORIDE 1 MG: 2 INJECTION, SOLUTION INTRAMUSCULAR; INTRAVENOUS; SUBCUTANEOUS at 17:20

## 2018-04-13 RX ADMIN — SENNOSIDES 17.2 MG: 8.6 TABLET, FILM COATED ORAL at 22:27

## 2018-04-13 RX ADMIN — CASTOR OIL AND BALSAM, PERU: 788; 87 OINTMENT TOPICAL at 17:21

## 2018-04-13 RX ADMIN — CARVEDILOL 12.5 MG: 12.5 TABLET, FILM COATED ORAL at 08:10

## 2018-04-13 RX ADMIN — HYDROMORPHONE HYDROCHLORIDE 1 MG: 2 INJECTION, SOLUTION INTRAMUSCULAR; INTRAVENOUS; SUBCUTANEOUS at 00:24

## 2018-04-13 RX ADMIN — AMITRIPTYLINE HYDROCHLORIDE 25 MG: 10 TABLET, FILM COATED ORAL at 22:27

## 2018-04-13 NOTE — PROGRESS NOTES
Progress Note    Patient: Stew Jin MRN: [id number]  SSN: [id number]    YOB: 1985  Age: 29 y.o. Sex: female      Admit Date: 3/11/2018    1 Day Post-Op    Procedure:  Procedure(s):  DIAGNOSTIC LAPAROSCOPY WITH LAPAROSCOPIC ABDOMINAL [address] OUT     Subjective:     No acute surgical issues. Pt reported more abdominal pain secondary to incisional pain. Tolerating renal diet without nausea or vomiting. Hemoglobin is stable.      Objective:     Visit Vitals    /71    Pulse (!) 106    Temp 98.8 °F (37.1 °C)    Resp 14    Ht 5' 5\" (1.651 m)    Wt 169 lb (76.7 kg)    SpO2 93%    Breastfeeding No    BMI 28.12 kg/m2       Temp (24hrs), Av.8 °F (37.1 °C), Min:97.9 °F (36.6 °C), Max:100.2 °F (37.9 °C)        Physical Exam:    Gen:  NAD  Pulm:  Unlabored  Abd:  S/ND/appropriate TTP  Wound:  C/D/I  JENNIFER with serosanguinous drainage    Assessment:     Hospital Problems  Date Reviewed: 3/11/2018          Codes Class Noted POA    Generalized abdominal pain ICD-10-CM: R10.84  ICD-9-CM: 789.07  2018 Unknown        Other constipation ICD-10-CM: K59.09  ICD-9-CM: 564.09  2018 Unknown        Other ascites ICD-10-CM: R18.8  ICD-9-CM: 789.59  2018 Unknown        * (Principal)Peritonitis ([facility] [address] ICD-10-CM: K65.9  ICD-9-CM: 567.9  3/11/2018 Yes              Plan/Recommendations/Medical Decision Making:     - Renal diet  - Monitor JENNIFER output  - May resume anticoagulation  - Activity as tolerated  - Continue antibiotic for loculated abscess in abdomen    Signed By: Rudy Torres MD     2018

## 2018-04-13 NOTE — ROUTINE PROCESS
Bedside shift change report given to Stephani (oncoming nurse) by Kevin Maharaj (offgoing nurse). Report included the following information SBAR, Kardex, ED Summary, OR Summary, Procedure Summary, Intake/Output and MAR.

## 2018-04-13 NOTE — PROGRESS NOTES
Physical Therapy Note:    PT attempted several times today to see patient with patient declining each time. Early she was groggy and wanted to wait until later 2/2 abdominal pain. PT returned this afternoon and patient up on [facility] with 2 PCTs present. Declined PT, requested waiting until 4:30 or later, explained PTs would be gone for the day. Suggested at least trying to walk to the door and back with PCT and RN also providing encouragement to get up. Educated regarding detriments of immobility. Patient continued to declined, citing abdominal pain. Also alerted that patient would likely not be seen over the weekend 2/2 scheduling priority, patient continued to decline OOB or any ambulation, stating she just wanted to get back in bed ASAP. Will cont to follow per plan of care as appropriate and per patient.     Geoff Berrios, MS, PT

## 2018-04-13 NOTE — PROGRESS NOTES
Palliative Medicine Consult  [place]: [phone])    Patient Name: Areli Gao  YOB: 1985    Date of Initial Consult: 4/4/18  Reason for Consult: Care decisions  Requesting Provider: Dr. Vj Rodriguez  Primary Care Physician: Roberto Cotto NP     SUMMARY:   Areli Gao is a 29 y.o. with a past history of SLE, end-stage renal disease (on hemodialysis, TTS), pulmonary embolism, on Eliquis (2012), hyperlipidemia, hypertension, recent Candida peritonitis, chronic bilateral pain, who was admitted on 3/11/2018 from home with a diagnosis of increasing abdominal pain. Current medical issues leading to Palliative Medicine involvement include: complicated hospital course with E'coli peritonitis, loculated ascites s/p drain placement, being followed by general surgery. We are asked to help with pain management. PALLIATIVE DIAGNOSES:   1. Generalized abdominal pain  2. Constipation  3. Other ascites  4. debility     PLAN:   1. Abdominal pain- having some post op pain. Dilaudid 1mg iv dosing interval changed to q 2 hours prn. Pt says current regimen is working well. Agree with the adjustment. 2. Transition to oral once pt is able to tolerate oral without vomiting  3. Will sign off. Please re-consult if needed. 3. Communicated plan of care with: Palliative IDT       GOALS OF CARE / TREATMENT PREFERENCES:     GOALS OF CARE:  Patient/Health Care Proxy Stated Goals: Cure      TREATMENT PREFERENCES:   Code Status: Full Code    Advance Care Planning:  Advance Care Planning 4/4/2018   Patient's Healthcare Decision Maker is: Legal Next of Kin   Primary Decision Maker Name Jass Maria   Primary Decision Maker Phone Number [phone]   Primary Decision Maker Relationship to Patient Parent   Confirm Advance Directive -   Patient Would Like to Complete Advance Directive -           Other Instructions:          Other:    As far as possible, the palliative care team has discussed with patient / health care proxy about goals of care / treatment preferences for patient. HISTORY:         HPI/SUBJECTIVE:    The patient is:   [x] Verbal and participatory  [] Non-participatory due to:     Pleasant young woman, her aunt and cousin are in the room providing her company. She just finished working with PT, did well. Abdominal pain- no prior pain history. Has colicky abdominal pain that is relieved with IV dilaudid. Pain lasts for a few minutes only. Very intense when it comes on like gas pains. 4/9/18: pt verbalized that she is still having significant pain. Took oxycodone at 3am without relief. Noted tentative plans for abdominal surgery in 2 days if s/s do not improve. Relief mainly with iv dilaudid  4/11/18: no new complaints. Anticipating surgery tomorrow. Dialysis access de-clotted today. Family at the bedside.   4/13/18: pt having some post op pain described as soreness and at the incision  Clinical Pain Assessment (nonverbal scale for severity on nonverbal patients):   Clinical Pain Assessment  Severity: 4  Location: abdomen  Character: incisional pain post op  Duration: weeks  Effect: functional  Factors: none  Frequency: on and off, intermittent          Duration: for how long has pt been experiencing pain (e.g., 2 days, 1 month, years)  Frequency: how often pain is an issue (e.g., several times per day, once every few days, constant)     FUNCTIONAL ASSESSMENT:     Palliative Performance Scale (PPS):  PPS: 80       PSYCHOSOCIAL/SPIRITUAL SCREENING:     Palliative IDT has assessed this patient for cultural preferences / practices and a referral made as appropriate to needs (Cultural Services, Patient Advocacy, Ethics, etc.)    Advance Care Planning:  Advance Care Planning 4/4/2018   Patient's Healthcare Decision Maker is: Legal Next of Maria Antonia 70   Primary Decision Maker Name Lashae Grove   Primary Decision Maker Phone Number [phone]   Primary Decision Maker Relationship to Patient Parent   Confirm Advance Directive -   Patient Would Like to Complete Advance Directive -       Any spiritual / Mormonism concerns:  [] Yes /  [x] No    Caregiver Burnout:  [] Yes /  [x] No /  [] No Caregiver Present      Anticipatory grief assessment:   [x] Normal  / [] Maladaptive       ESAS Anxiety: Anxiety: 0    ESAS Depression: Depression: 0        REVIEW OF SYSTEMS:     Positive and pertinent negative findings in ROS are noted above in HPI. The following systems were [x] reviewed / [] unable to be reviewed as noted in HPI  Other findings are noted below. Systems: constitutional, ears/nose/mouth/throat, respiratory, gastrointestinal, genitourinary, musculoskeletal, integumentary, neurologic, psychiatric, endocrine. Positive findings noted below. Modified ESAS Completed by: provider   Fatigue: 0 Drowsiness: 0   Depression: 0 Pain: 4   Anxiety: 0 Nausea: 0   Anorexia: 0 Dyspnea: 0     Constipation: Yes     Stool Occurrence(s): 1        PHYSICAL EXAM:     From RN flowsheet:  Wt Readings from Last 3 Encounters:   04/13/18 169 lb (76.7 kg)   03/05/18 150 lb (68 kg)   01/29/18 153 lb (69.4 kg)     Blood pressure 102/74, pulse 98, temperature 97.8 °F (36.6 °C), resp. rate 14, height 5' 5\" (1.651 m), weight 169 lb (76.7 kg), SpO2 94 %, not currently breastfeeding.     Pain Scale 1: Numeric (0 - 10)  Pain Intensity 1: 8  Pain Onset 1: postop  Pain Location 1: Abdomen  Pain Orientation 1: Lower  Pain Description 1: Aching  Pain Intervention(s) 1: Medication (see MAR)  Last bowel movement, if known:     Constitutional: alert, oriented  Eyes: pupils equal, anicteric  ENMT: no nasal discharge, moist mucous membranes  Cardiovascular:   Respiratory: breathing not labored, symmetric  Gastrointestinal: drains intact, incision clean  Musculoskeletal: no deformity, no tenderness to palpation  Skin: hair very thin on scalp, ?tinea capitus  Neurologic: following commands, moving all extremities  Psychiatric: full affect, no hallucinations  Other:       HISTORY:     Principal Problem:    Peritonitis ([address] (3/11/2018)    Active Problems:    Generalized abdominal pain (2018)      Other constipation (2018)      Other ascites (2018)      Past Medical History:   Diagnosis Date    Anemia     secondary to lupus    Asthma     no inhaler use in past 2 to 3 years    Carditis     Chronic kidney disease     ESRD    Chronic pain     DDD (degenerative disc disease), lumbar     ESRD (end stage renal disease) ([address]     GERD (gastroesophageal reflux disease)     Heart failure ([address]     Hemodialysis patient ([address] 2017    [address]  Tuesday,  Thursday,  and Saturday. 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  Poor historian 2018    With medications    Thromboembolus ([address] 2013    lungs    Transfusion history     [facility] Trans[facility] 2017  [facility]      Past Surgical History:   Procedure Laterality Date    HX  SECTION  11/2006    x1    HX OTHER SURGICAL  9/16/15    INSERTION PD CATH; Removed 2017    HX VASCULAR ACCESS Right 2017    Double-Lumen henry catheter upper chest      Family History   Problem Relation Age of Onset    Diabetes Father     Hypertension Father     Cancer Other      aunt with breast cancer    Diabetes Mother       History reviewed, no pertinent family history.   Social History   Substance Use Topics    Smoking status: Never Smoker    Smokeless tobacco: Never Used    Alcohol use No     Allergies   Allergen Reactions    Compazine [Prochlorperazine Edisylate] Nausea and Vomiting and Palpitations      Current Facility-Administered Medications   Medication Dose Route Frequency    HYDROmorphone (PF) (DILAUDID) injection 1 mg  1 mg IntraVENous Q2H PRN    apixaban (ELIQUIS) tablet 5 mg  5 mg Oral BID    piperacillin-tazobactam (ZOSYN) 3.375 g in 0.9% sodium chloride (MBP/ADV) 100 mL  3.375 g IntraVENous Q12H    sodium chloride (NS) flush 5-10 mL  5-10 mL IntraVENous Q8H    sodium chloride (NS) flush 5-10 mL  5-10 mL IntraVENous PRN    lidocaine (PF) (XYLOCAINE) 10 mg/mL (1 %) injection 0.1 mL  0.1 mL SubCUTAneous PRN    midazolam (VERSED) injection 1 mg  1 mg IntraVENous PRN    epoetin pia (EPOGEN;PROCRIT) injection 10,000 Units  10,000 Units SubCUTAneous DIALYSIS TUE, THU & SAT    lactulose (CHRONULAC) solution 10 g  10 g Oral PRN    oxyCODONE IR (ROXICODONE) tablet 10 mg  10 mg Oral Q6H PRN    polyethylene glycol (MIRALAX) packet 17 g  17 g Oral DAILY    senna (SENOKOT) tablet 17.2 mg  2 Tab Oral QHS    balsam peru-castor oil (VENELEX)  mg/gram ointment   Topical Q8H    amLODIPine (NORVASC) tablet 5 mg  5 mg Oral DAILY    carvedilol (COREG) tablet 12.5 mg  12.5 mg Oral BID WITH MEALS    dextrose 5% and 0.9% NaCl infusion  15 mL/hr IntraVENous CONTINUOUS    hydrALAZINE (APRESOLINE) 20 mg/mL injection 5 mg  5 mg IntraVENous Q6H PRN    0.9% sodium chloride infusion 250 mL  250 mL IntraVENous PRN    glucose chewable tablet 16 g  4 Tab Oral PRN    dextrose (D50W) injection syrg 12.5-25 g  12.5-25 g IntraVENous PRN    glucagon (GLUCAGEN) injection 1 mg  1 mg IntraMUSCular PRN    predniSONE (DELTASONE) tablet 5 mg  5 mg Oral DAILY    pantoprazole (PROTONIX) tablet 40 mg  40 mg Oral ACB    cyanocobalamin (VITAMIN B12) tablet 2,500 mcg  2,500 mcg Oral DAILY    hydroxychloroquine (PLAQUENIL) tablet 200 mg  200 mg Oral BID    albuterol (PROVENTIL VENTOLIN) nebulizer solution 2.5 mg  2.5 mg Nebulization Q4H PRN    gemfibrozil (LOPID) tablet 300 mg  300 mg Oral DAILY    amitriptyline (ELAVIL) tablet 25 mg  25 mg Oral QHS    sodium chloride (NS) flush 5-10 mL  5-10 mL IntraVENous Q8H    sodium chloride (NS) flush 5-10 mL  5-10 mL IntraVENous PRN    ondansetron (ZOFRAN) injection 4 mg  4 mg IntraVENous Q4H PRN    arformoterol (BROVANA) neb solution 15 mcg  15 mcg Nebulization BID RT    And    budesonide (PULMICORT) 500 mcg/2 ml nebulizer suspension  500 mcg Nebulization BID RT          LAB AND IMAGING FINDINGS:     Lab Results   Component Value Date/Time    WBC 6.3 04/13/2018 03:53 AM    HGB 8.0 (L) 04/13/2018 03:53 AM    PLATELET 300 52/01/5508 03:53 AM     Lab Results   Component Value Date/Time    Sodium 141 04/13/2018 03:53 AM    Potassium 4.7 04/13/2018 03:53 AM    Chloride 106 04/13/2018 03:53 AM    CO2 28 04/13/2018 03:53 AM    BUN 19 04/13/2018 03:53 AM    Creatinine 5.43 (H) 04/13/2018 03:53 AM    Calcium 7.8 (L) 04/13/2018 03:53 AM    Magnesium 1.9 04/11/2018 03:04 AM    Phosphorus 3.4 04/13/2018 03:53 AM      Lab Results   Component Value Date/Time    AST (SGOT) 16 04/09/2018 03:00 PM    Alk.  phosphatase 92 04/09/2018 03:00 PM    Protein, total 6.1 (L) 04/09/2018 03:00 PM    Albumin 1.3 (L) 04/09/2018 03:00 PM    Globulin 4.8 (H) 04/09/2018 03:00 PM     Lab Results   Component Value Date/Time    INR 1.3 (H) 03/25/2018 04:34 AM    Prothrombin time 13.5 (H) 03/25/2018 04:34 AM    aPTT 31.3 03/25/2018 04:34 AM      Lab Results   Component Value Date/Time    Iron 22 (L) 03/15/2018 10:32 AM    TIBC 52 (L) 03/15/2018 10:32 AM    Iron % saturation 42 03/15/2018 10:32 AM    Ferritin 548 (H) 03/15/2018 10:32 AM      No results found for: PH, PCO2, PO2  No components found for: [place]   Lab Results   Component Value Date/Time    CK 20 (L) 04/25/2013 05:20 PM    CK - MB <0.5 (L) 04/25/2013 05:20 PM                Total time: 25 minutes  Counseling / coordination time, spent as noted above:  20m  > 50% counseling / coordination?: y    Prolonged service was provided for  []30 min []75 min in face to face time in the presence of the patient, spent as noted above. Time Start:   Time End:   Note: this can only be billed with 24624 (initial) or 60093 (follow up). If multiple start / stop times, list each separately.

## 2018-04-13 NOTE — OP NOTES
[facility] OP NOTE    Giovanny Shannon  MR#: [id number]  : 1985  ACCOUNT #: [de-identified]   DATE OF SERVICE: 2018    PREOPERATIVE DIAGNOSIS:  Subacute bacterial peritonitis with abscess. POSTOPERATIVE DIAGNOSIS:  Subacute bacterial peritonitis with abscess. PROCEDURE PERFORMED:  Diagnostic laparoscopy, with laparoscopic abdominal washout and drain placement. PRIMARY SURGEON:  Savannah Cordoba MD     ASSISTANT:  Vilma Chavez    ANESTHESIA:  General endotracheal.    ESTIMATED BLOOD LOSS:  Minimal.    SPECIMENS REMOVED:  None. FINDINGS:  There were significant adhesions and fibrinous exudate with loculated abscess in the peritoneal cavity, this was suction irrigated. COMPLICATIONS:  None. IMPLANTS:  None. DRAINS AND TUBES:  Two 19-[place] Lew drains were placed in the left and right pericolic gutter extending to the pelvis. This was externalized to the left lower quadrant and the right lower quadrant. INDICATION FOR OPERATION:  The patient is a 71-year-old woman with a history of peritoneal dialysis for end-stage renal disease, who has subsequently developed an infected peritoneal dialysis catheter. The catheter was removed, and then she was noted to develop an abscess in her peritoneal cavity. She had a pigtail drain placed by IR, which was inadequate. After several weeks of treatment with pigtail drainage, she continues to have significant abdominal pain with nausea and vomiting. Given these findings, the decision was made to take her to the operating room for a diagnostic laparoscopy with laparoscopic washout and drains placement. PROCEDURE IN DETAIL:  After informed consent was obtained from the patient, the patient was taken to the operating room and placed in supine position on the operating table. She underwent general anesthesia with endotracheal intubation without any complication.   The abdomen and pelvis were then prepped and draped in the usual sterile surgical fashion. A surgical time-out was then performed. Preoperative antibiotic was given 30 minutes prior to skin incision. After the time-out was performed, an incision was made just to the left of the midline along her previous PD catheter incision site. We then attempted to place a Veress needle into this incision; however, we were not able to achieve insufflation. At this point, the decision was made to perform a direct laparoscopic drill-down using the Optiview. We then used a 5 mm Optiview trocar with a 0 laparoscope and this was used to drill into the peritoneal cavity without any complication. Once this was achieved, we then insufflated to 15 mmHg. Upon entering the peritoneal cavity, we then performed a laparoscopy. The patient was noted to have a significant amount of fibrinous exudate throughout her abdomen and pelvis. We tried to suction out as much as we could. We then placed 2 additional 5 mm trocars, one was placed in the right lower quadrant, another one was placed in the left lower quadrant. All these trocars were placed under direct laparoscopic visualization. After the trocars were placed, we then noted there was some loculations in the pelvis with abscess and fibrinous exudate. This was irrigated out as best we could. There was some residual fibrinous exudate that we could not safely irrigate it out. At this point, we placed two 19-[place] Lew drains into the right and left pericolic gutter. These drains were guided to the pelvis. The drain was then externalized to the right and left lower quadrant respectively. The drain was then secured with 2-0 nylon. After the drain was secured, we then removed the trocar under direct laparoscopic visualization. All skin incisions were then closed with 4-0 Monocryl in a subcuticular fashion. Dermabond glue was then applied over the incision. The patient tolerated the procedure well.   There were no complications associated with the operation. Dr. Rudy Torres, the attending surgeon and was present during the entirety of the case. All lap, needle and instrument counts were correct x2. The patient was successfully extubated and was transported to the PACU in stable condition.       MD BENTLEY Paul / TORI  D: 04/13/2018 00:12     T: 04/13/2018 08:35  JOB #: [id number]

## 2018-04-13 NOTE — PROGRESS NOTES
Patient name: Abdi Recinos  MRN: [id number]    Nephrology Progress note:    Assessment:    ESRD: on HD TTS - [facility]     AVG - poor function    E. Coli Bacteremia: source intra-abd source/ E. Coli peritonitis. IV zosyn. Repeat Bld Cx 3/11-> Neg. Recent hx of Candida peritonitis. 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: Hgb remains below goal - on EPO    SHPT: Hypophosphatemia-> give Neutra-Phos    Protein malnutrition    Hypoglycemia: wean D5NS fluids as tolerated    Edema: 3rd spacing-> increase UF on HD    Plan:  HD now MWF  Will follow back on monday      Subjective   resting  Exam:  Visit Vitals    /71    Pulse (!) 106    Temp 98.8 °F (37.1 °C)    Resp 14    Ht 5' 5\" (1.651 m)    Wt 76.7 kg (169 lb)    SpO2 96%    Breastfeeding No    BMI 28.12 kg/m2     Wt Readings from Last 3 Encounters:   04/13/18 76.7 kg (169 lb)   03/05/18 68 kg (150 lb)   01/29/18 69.4 kg (153 lb)       Intake/Output Summary (Last 24 hours) at 04/13/18 1006  Last data filed at 04/13/18 0659   Gross per 24 hour   Intake              150 ml   Output              520 ml   Net             -370 ml       resting      Labs/Data:    Lab Results   Component Value Date/Time    WBC 6.3 04/13/2018 03:53 AM    Hemoglobin (POC) 7.8 (L) 01/19/2018 12:30 PM    HGB 8.0 (L) 04/13/2018 03:53 AM    Hematocrit (POC) 23 (L) 01/19/2018 12:30 PM    HCT 27.2 (L) 04/13/2018 03:53 AM    PLATELET 031 65/97/3974 03:53 AM MCV 93.8 04/13/2018 03:53 AM       Lab Results   Component Value Date/Time    Sodium 141 04/13/2018 03:53 AM    Potassium 4.7 04/13/2018 03:53 AM    Chloride 106 04/13/2018 03:53 AM    CO2 28 04/13/2018 03:53 AM    Anion gap 7 04/13/2018 03:53 AM    Glucose 78 04/13/2018 03:53 AM    BUN 19 04/13/2018 03:53 AM    Creatinine 5.43 (H) 04/13/2018 03:53 AM    BUN/Creatinine ratio 3 (L) 04/13/2018 03:53 AM    GFR est AA 11 (L) 04/13/2018 03:53 AM    GFR est non-AA 9 (L) 04/13/2018 03:53 AM    Calcium 7.8 (L) 04/13/2018 03:53 AM        Discussed with patient

## 2018-04-13 NOTE — PROGRESS NOTES
Hospitalist Progress Note            This is a 29-year-old female with multiple medical problems including SLE, end-stage renal disease (on hemodialysis, TTS), pulmonary embolism, on Eliquis (2012), hyperlipidemia, hypertension, recent Candida peritonitis, chronic bilateral pain.  She comes over here because of abdominal pain. Daily Progress Note: 4/13/2018    Assessment/Plan:   Acute peritonitis (e. coli) with sepsis  - also with recent candida peritonitis  - Peritoneal fluid heavy E coli on 3/11 and 3/18  - drain exchanged and upsized by IR on 4/5  - completed IV abx on 4/6/18  - has had multiple CT scans showing interval changes, but had to get washout due to loculations and abscess  - went to OR today for wash-out yesterday. Now with bilateral drains and serosanguinous output  - appreciate surgical recs       HTN  -continue current meds     Thrombocytopenia   -resolved      Bilateral LE edema  -likely third-spacing  -Dopplers negative for DVT      SLE  -continue home meds  -D/C'd Imuran, allopurinol by Nephro with acute infection and thrombocytopenia      History of PE  - on Eliquis since 2012  - per documentation from prior attending: \"appreciate discussion with PMD 3/13. The patient was seen by hematology in 2013 but seems was lost to follow up. I think at that time the plan was to stop anticoagulation if her SLE is stable. Spoke to Dr Yoandy Ren, he feels that the patient can come off Eliquis. His records do not suggest any recurrence of DVT/PE.  Spoke to Dr Earl Lin, he has not seen the patient since >1 yr. If antiphospholipid antibodies negative then the patient can come off Eliquis\"  - 10/17 V/Q high probability for PE  - per Oncology the patient should be continued to Eliquis indefinitely      Hypophosphatemia:  -replete prn     ESRD  -on HD TTS  -RUE AVG was malfunctioning, s/p OR this AM     Anemia of chronic disease  -Transfused 2 units PRBC with HD 3/15     Hepatic steatosis    Severe protein calorie malnutrition  -consult to nutrition    Constipation:  -bowel regimen      Full code  Eliquis    Discussed with patient/family  Dispo: TBD       Subjective:     Patient seen after she went to the OR    Review of Systems:   As above    Objective:     Visit Vitals    /80    Pulse (!) 110    Temp 100.2 °F (37.9 °C)    Resp 18    Ht 5' 5\" (1.651 m)    Wt 76.7 kg (169 lb)    SpO2 95%    Breastfeeding No    BMI 28.12 kg/m2    O2 Flow Rate (L/min): 2 l/min O2 Device: Room air    Temp (24hrs), Av.7 °F (37.1 °C), Min:97.9 °F (36.6 °C), Max:100.2 °F (37.9 °C)          1901 -  0700  In: 150 [I.V.:150]  Out: 4020 [Drains:510]    EXAM:  General: Chronically-ill appearing, NAD    HEENT: Anicteric sclerae, MMM  Neck:   Supple, trachea midline  Lungs:  CTA bilaterally. No Wheezing  Heart:  Regular rhythm,  No murmur   Abdomen: Dressing c/d/i. Tender.  Drains with serosanguineous drainage  Extremities: Warm, +peripheral edema  Neurologic:  normal speech, moves all extremities       Data Review:     Recent Results (from the past 24 hour(s))   GLUCOSE, POC    Collection Time: 18  4:12 PM   Result Value Ref Range    Glucose (POC) 111 (H) 65 - 100 mg/dL    Performed by Tiffani Ceron, POC    Collection Time: 18 10:09 PM   Result Value Ref Range    Glucose (POC) 68 65 - 100 mg/dL    Performed by [address], POC    Collection Time: 18 10:30 PM   Result Value Ref Range    Glucose (POC) 69 65 - 100 mg/dL    Performed by Aditya Page    GLUCOSE, POC    Collection Time: 18 11:13 PM   Result Value Ref Range    Glucose (POC) 112 (H) 65 - 100 mg/dL    Performed by Ewa Velez    CBC W/O DIFF    Collection Time: 18  3:53 AM   Result Value Ref Range    WBC 6.3 3.6 - 11.0 K/uL    RBC 2.90 (L) 3.80 - 5.20 M/uL    HGB 8.0 (L) 11.5 - 16.0 g/dL    HCT 27.2 (L) 35.0 - 47.0 %    MCV 93.8 80.0 - 99.0 FL    MCH 27.6 26.0 - 34.0 PG    MCHC 29.4 (L) 30.0 - 36.5 g/dL    RDW 22.3 (H) 11.5 - 14.5 %    PLATELET 057 626 - 950 K/uL    MPV 10.5 8.9 - 12.9 FL    NRBC 0.5 (H) 0  WBC    ABSOLUTE NRBC 0.03 (H) 0.00 - 3.49 K/uL   METABOLIC PANEL, BASIC    Collection Time: 04/13/18  3:53 AM   Result Value Ref Range    Sodium 141 136 - 145 mmol/L    Potassium 4.7 3.5 - 5.1 mmol/L    Chloride 106 97 - 108 mmol/L    CO2 28 21 - 32 mmol/L    Anion gap 7 5 - 15 mmol/L    Glucose 78 65 - 100 mg/dL    BUN 19 6 - 20 MG/DL    Creatinine 5.43 (H) 0.55 - 1.02 MG/DL    BUN/Creatinine ratio 3 (L) 12 - 20      GFR est AA 11 (L) >60 ml/min/1.73m2    GFR est non-AA 9 (L) >60 ml/min/1.73m2    Calcium 7.8 (L) 8.5 - 10.1 MG/DL   PHOSPHORUS    Collection Time: 04/13/18  3:53 AM   Result Value Ref Range    Phosphorus 3.4 2.6 - 4.7 MG/DL   GLUCOSE, POC    Collection Time: 04/13/18  6:41 AM   Result Value Ref Range    Glucose (POC) 83 65 - 100 mg/dL    Performed by Kristina Jackson        Principal Problem:    Peritonitis ([address] (3/11/2018)    Active Problems:    Generalized abdominal pain (4/4/2018)      Other constipation (4/4/2018)      Other ascites (4/4/2018)        Medications reviewed  Current Facility-Administered Medications   Medication Dose Route Frequency    apixaban (ELIQUIS) tablet 5 mg  5 mg Oral BID    piperacillin-tazobactam (ZOSYN) 3.375 g in 0.9% sodium chloride (MBP/ADV) 100 mL  3.375 g IntraVENous Q12H    HYDROmorphone (PF) (DILAUDID) injection 1 mg  1 mg IntraVENous Q3H PRN    sodium chloride (NS) flush 5-10 mL  5-10 mL IntraVENous Q8H    sodium chloride (NS) flush 5-10 mL  5-10 mL IntraVENous PRN    lidocaine (PF) (XYLOCAINE) 10 mg/mL (1 %) injection 0.1 mL  0.1 mL SubCUTAneous PRN    midazolam (VERSED) injection 1 mg  1 mg IntraVENous PRN    epoetin pia (EPOGEN;PROCRIT) injection 10,000 Units  10,000 Units SubCUTAneous DIALYSIS TUE, THU & SAT    lactulose (CHRONULAC) solution 10 g  10 g Oral PRN    oxyCODONE IR (ROXICODONE) tablet 10 mg  10 mg Oral Q6H PRN    polyethylene glycol (MIRALAX) packet 17 g  17 g Oral DAILY    senna (SENOKOT) tablet 17.2 mg  2 Tab Oral QHS    balsam peru-castor oil (VENELEX)  mg/gram ointment   Topical Q8H    amLODIPine (NORVASC) tablet 5 mg  5 mg Oral DAILY    carvedilol (COREG) tablet 12.5 mg  12.5 mg Oral BID WITH MEALS    dextrose 5% and 0.9% NaCl infusion  15 mL/hr IntraVENous CONTINUOUS    hydrALAZINE (APRESOLINE) 20 mg/mL injection 5 mg  5 mg IntraVENous Q6H PRN    0.9% sodium chloride infusion 250 mL  250 mL IntraVENous PRN    glucose chewable tablet 16 g  4 Tab Oral PRN    dextrose (D50W) injection syrg 12.5-25 g  12.5-25 g IntraVENous PRN    glucagon (GLUCAGEN) injection 1 mg  1 mg IntraMUSCular PRN    predniSONE (DELTASONE) tablet 5 mg  5 mg Oral DAILY    pantoprazole (PROTONIX) tablet 40 mg  40 mg Oral ACB    cyanocobalamin (VITAMIN B12) tablet 2,500 mcg  2,500 mcg Oral DAILY    hydroxychloroquine (PLAQUENIL) tablet 200 mg  200 mg Oral BID    albuterol (PROVENTIL VENTOLIN) nebulizer solution 2.5 mg  2.5 mg Nebulization Q4H PRN    gemfibrozil (LOPID) tablet 300 mg  300 mg Oral DAILY    amitriptyline (ELAVIL) tablet 25 mg  25 mg Oral QHS    sodium chloride (NS) flush 5-10 mL  5-10 mL IntraVENous Q8H    sodium chloride (NS) flush 5-10 mL  5-10 mL IntraVENous PRN    ondansetron (ZOFRAN) injection 4 mg  4 mg IntraVENous Q4H PRN    arformoterol (BROVANA) neb solution 15 mcg  15 mcg Nebulization BID RT    And    budesonide (PULMICORT) 500 mcg/2 ml nebulizer suspension  500 mcg Nebulization BID RT       Donny Leon MD

## 2018-04-13 NOTE — PROGRESS NOTES
NUTRITION COMPLETE ASSESSMENT    RECOMMENDATIONS:   1. Continue current diet  2. Encourage increased po intake; document in flow sheets  3. Weekly weights     Interventions/Plan:   Food/Nutrient Delivery:  Renal diet + snacks Commercial supplement (Nepro BID)          Assessment:   Reason for Assessment:   [x]Reassessment     Diet: Renal  Supplements: Nepro BID  Nutritionally Significant Medications: [x] Reviewed & Includes: Vit B12, Epoetin Joe, Protonix,  Miralax, Senokot, D5 w/NaCl @15mL/hr  Meal Intake: No data found. Pre-Hospitalization:  Usual Appetite: Poor  Diet at Home: regular  Vitamins/Supplements: Yes (1 nepro daily)    Current Hospitalization:   Fluid Restriction:  none  Appetite: Fair  PO Ability: Independent Average po intake:25-50%  Average supplements intake:  50%      Subjective:  \"I'm starting to eat better. My stomach is geoff sore from the surgery yesterday. \"    Objective:  Pt seen for follow up. Pt admitted with peritinitis, ESRD. PMHx: SLE; ESRD on HD; CHF, pulmonary emoblism, HTN. Reviewed chart, spoke with pt. Pt POD 1 abdominal washout. Pt reports appetite a little better today (didn't eat last night after surgery); consuming ~50% per pt of meals, supplements; pt also receiving snacks at night. Nepro BID providing 850 kcal, 38g protein meeting 43% caloric, 41% protein needs. Noted wt has fluctuated between 157 lbs to 169 lbs; pt edematous. RD to follow po intake, wt status, renal labs. Estimated Nutrition Needs:   Kcals/day: 1980 Kcals/day (1980-2310kcal)  Protein: 92 g (79-92g (1.2-1.4g/kg))  Fluid: 1980 ml (30ml/kg or per renal)  Based On: Kcal/kg - specify (Comment) (30-35kcal/kg)  Weight Used: Actual wt (66kg)    Pt expected to meet estimated nutrient needs:  []   Yes     []  No [] Unable to predict at this time    Nutrition Diagnosis:   1.  Malnutrition related to inadequate protein/energy intake 2/2 poor appetite as evidenced by consuming less than 60% energy and 59% protein needs daily; severe wt loss of 21% x 6 months; muscle/fat wasting    2. (Increased protein/energy needs) related to increased energy expenditure 2/2 ESRD as evidenced by on HD, severe wt loss since start of HD    Goals:     Consumption of at least 50% of meals and 2 Nepro per day + 2 supplements/day in 5-7 days     Monitoring & Evaluation:    - Total energy intake, Liquid meal replacement   - Weight/weight change, Electrolyte and renal profile, GI profile   -      Previous Nutrition Goals Met:   N/A  Previous Recommendations:    N/A    Education & Discharge Needs:   [x] None Identified   [] Identified and addressed    [] Participated in care plan, discharge planning, and/or interdisciplinary rounds        Cultural, Catholic and ethnic food preferences identified: None    Skin Integrity: []Intact  [x]Other - surgical incision abodomen  Edema: []None [x]Other- facial trace; LLE, RLE 1+; RUE 2+  Last BM: 4/11/2018  Food Allergies: [x]None []Other  Diet Restrictions: Cultural/Yarsani Preference(s): None     Anthropometrics:    Weight Loss Metrics 4/13/2018 3/5/2018 1/29/2018 1/19/2018 1/17/2018 1/3/2018 12/21/2017   Today's Wt 169 lb 150 lb 153 lb 146 lb 9.7 oz 146 lb 9.7 oz 135 lb 130 lb 9.6 oz   BMI 28.12 kg/m2 24.96 kg/m2 25.46 kg/m2 24.4 kg/m2 24.4 kg/m2 22.47 kg/m2 -      Weight Source: Bed  Height: 5' 5\" (165.1 cm),    Body mass index is 28.12 kg/(m^2).   IBW : 56.7 kg (125 lb), % IBW (Calculated): 117.04 %  Usual Body Weight: 83 kg (183 lb),      Labs:    Lab Results   Component Value Date/Time    Sodium 141 04/13/2018 03:53 AM    Potassium 4.7 04/13/2018 03:53 AM    Chloride 106 04/13/2018 03:53 AM    CO2 28 04/13/2018 03:53 AM    Glucose 78 04/13/2018 03:53 AM    BUN 19 04/13/2018 03:53 AM    Creatinine 5.43 (H) 04/13/2018 03:53 AM    Calcium 7.8 (L) 04/13/2018 03:53 AM    Magnesium 1.9 04/11/2018 03:04 AM    Phosphorus 3.4 04/13/2018 03:53 AM    Albumin 1.3 (L) 04/09/2018 03:00 PM     Lab Results Component Value Date/Time    Hemoglobin A1c 5.4 09/25/2015 02:02 PM         Acacia Pop RD

## 2018-04-13 NOTE — PROGRESS NOTES
Problem: Falls - Risk of  Goal: *Absence of Falls  Document Alex Fall Risk and appropriate interventions in the flowsheet.    Outcome: Progressing Towards Goal  Fall Risk Interventions:  Mobility Interventions: Communicate number of staff needed for ambulation/transfer, OT consult for ADLs, Patient to call before getting OOB, PT Consult for mobility concerns, PT Consult for assist device competence, Strengthening exercises (ROM-active/passive), Utilize walker, cane, or other assitive device, Utilize gait belt for transfers/ambulation    Mentation Interventions: Adequate sleep, hydration, pain control, Bed/chair exit alarm, Door open when patient unattended, Increase mobility    Medication Interventions: Evaluate medications/consider consulting pharmacy, Patient to call before getting OOB, Teach patient to arise slowly, Utilize gait belt for transfers/ambulation    Elimination Interventions: Call light in reach, Elevated toilet seat, Patient to call for help with toileting needs, Toilet paper/wipes in reach, Toileting schedule/hourly rounds

## 2018-04-14 LAB
GLUCOSE BLD STRIP.AUTO-MCNC: 221 MG/DL (ref 65–100)
GLUCOSE BLD STRIP.AUTO-MCNC: 89 MG/DL (ref 65–100)
GLUCOSE BLD STRIP.AUTO-MCNC: 92 MG/DL (ref 65–100)
SERVICE CMNT-IMP: ABNORMAL
SERVICE CMNT-IMP: NORMAL
SERVICE CMNT-IMP: NORMAL

## 2018-04-14 PROCEDURE — 74011250636 HC RX REV CODE- 250/636: Performed by: INTERNAL MEDICINE

## 2018-04-14 PROCEDURE — 74011636637 HC RX REV CODE- 636/637: Performed by: HOSPITALIST

## 2018-04-14 PROCEDURE — 74011250637 HC RX REV CODE- 250/637: Performed by: INTERNAL MEDICINE

## 2018-04-14 PROCEDURE — 74011000250 HC RX REV CODE- 250: Performed by: HOSPITALIST

## 2018-04-14 PROCEDURE — 74011250636 HC RX REV CODE- 250/636: Performed by: NURSE PRACTITIONER

## 2018-04-14 PROCEDURE — 65270000029 HC RM PRIVATE

## 2018-04-14 PROCEDURE — 74011250636 HC RX REV CODE- 250/636: Performed by: SURGERY

## 2018-04-14 PROCEDURE — 74011000258 HC RX REV CODE- 258: Performed by: SURGERY

## 2018-04-14 PROCEDURE — 74011250637 HC RX REV CODE- 250/637: Performed by: HOSPITALIST

## 2018-04-14 PROCEDURE — 74011250637 HC RX REV CODE- 250/637: Performed by: SURGERY

## 2018-04-14 PROCEDURE — 82962 GLUCOSE BLOOD TEST: CPT

## 2018-04-14 PROCEDURE — 94640 AIRWAY INHALATION TREATMENT: CPT

## 2018-04-14 RX ORDER — HYDROMORPHONE HYDROCHLORIDE 2 MG/ML
2 INJECTION, SOLUTION INTRAMUSCULAR; INTRAVENOUS; SUBCUTANEOUS
Status: DISCONTINUED | OUTPATIENT
Start: 2018-04-14 | End: 2018-04-17

## 2018-04-14 RX ORDER — AMOXICILLIN 250 MG
1 CAPSULE ORAL 2 TIMES DAILY
Status: DISCONTINUED | OUTPATIENT
Start: 2018-04-14 | End: 2018-04-20 | Stop reason: HOSPADM

## 2018-04-14 RX ADMIN — CARVEDILOL 12.5 MG: 12.5 TABLET, FILM COATED ORAL at 08:56

## 2018-04-14 RX ADMIN — HYDROMORPHONE HYDROCHLORIDE 2 MG: 2 INJECTION INTRAMUSCULAR; INTRAVENOUS; SUBCUTANEOUS at 21:44

## 2018-04-14 RX ADMIN — BUDESONIDE 500 MCG: 0.5 INHALANT RESPIRATORY (INHALATION) at 09:05

## 2018-04-14 RX ADMIN — ARFORMOTEROL TARTRATE 15 MCG: 15 SOLUTION RESPIRATORY (INHALATION) at 20:58

## 2018-04-14 RX ADMIN — BUDESONIDE 500 MCG: 0.5 INHALANT RESPIRATORY (INHALATION) at 20:58

## 2018-04-14 RX ADMIN — HYDROMORPHONE HYDROCHLORIDE 2 MG: 2 INJECTION INTRAMUSCULAR; INTRAVENOUS; SUBCUTANEOUS at 18:10

## 2018-04-14 RX ADMIN — ARFORMOTEROL TARTRATE 15 MCG: 15 SOLUTION RESPIRATORY (INHALATION) at 09:05

## 2018-04-14 RX ADMIN — HYDROMORPHONE HYDROCHLORIDE 1 MG: 2 INJECTION, SOLUTION INTRAMUSCULAR; INTRAVENOUS; SUBCUTANEOUS at 11:00

## 2018-04-14 RX ADMIN — Medication 10 ML: at 05:01

## 2018-04-14 RX ADMIN — APIXABAN 5 MG: 5 TABLET, FILM COATED ORAL at 18:10

## 2018-04-14 RX ADMIN — CASTOR OIL AND BALSAM, PERU: 788; 87 OINTMENT TOPICAL at 13:23

## 2018-04-14 RX ADMIN — PANTOPRAZOLE SODIUM 40 MG: 40 TABLET, DELAYED RELEASE ORAL at 06:45

## 2018-04-14 RX ADMIN — APIXABAN 5 MG: 5 TABLET, FILM COATED ORAL at 08:56

## 2018-04-14 RX ADMIN — CASTOR OIL AND BALSAM, PERU: 788; 87 OINTMENT TOPICAL at 06:47

## 2018-04-14 RX ADMIN — AMLODIPINE BESYLATE 5 MG: 5 TABLET ORAL at 08:55

## 2018-04-14 RX ADMIN — HYDROMORPHONE HYDROCHLORIDE 2 MG: 2 INJECTION INTRAMUSCULAR; INTRAVENOUS; SUBCUTANEOUS at 13:22

## 2018-04-14 RX ADMIN — HYDROMORPHONE HYDROCHLORIDE 1 MG: 2 INJECTION, SOLUTION INTRAMUSCULAR; INTRAVENOUS; SUBCUTANEOUS at 00:55

## 2018-04-14 RX ADMIN — PIPERACILLIN SODIUM,TAZOBACTAM SODIUM 3.38 G: 3; .375 INJECTION, POWDER, FOR SOLUTION INTRAVENOUS at 08:55

## 2018-04-14 RX ADMIN — HYDROXYCHLOROQUINE SULFATE 200 MG: 200 TABLET, FILM COATED ORAL at 08:55

## 2018-04-14 RX ADMIN — GEMFIBROZIL 300 MG: 600 TABLET ORAL at 08:56

## 2018-04-14 RX ADMIN — Medication 10 ML: at 14:00

## 2018-04-14 RX ADMIN — PIPERACILLIN SODIUM,TAZOBACTAM SODIUM 3.38 G: 3; .375 INJECTION, POWDER, FOR SOLUTION INTRAVENOUS at 21:44

## 2018-04-14 RX ADMIN — STANDARDIZED SENNA CONCENTRATE AND DOCUSATE SODIUM 1 TABLET: 8.6; 5 TABLET, FILM COATED ORAL at 18:10

## 2018-04-14 RX ADMIN — AMITRIPTYLINE HYDROCHLORIDE 25 MG: 10 TABLET, FILM COATED ORAL at 21:44

## 2018-04-14 RX ADMIN — HYDROMORPHONE HYDROCHLORIDE 1 MG: 2 INJECTION, SOLUTION INTRAMUSCULAR; INTRAVENOUS; SUBCUTANEOUS at 08:56

## 2018-04-14 RX ADMIN — Medication 2500 MCG: at 08:55

## 2018-04-14 RX ADMIN — PREDNISONE 5 MG: 5 TABLET ORAL at 08:56

## 2018-04-14 RX ADMIN — HYDROMORPHONE HYDROCHLORIDE 1 MG: 2 INJECTION, SOLUTION INTRAMUSCULAR; INTRAVENOUS; SUBCUTANEOUS at 06:46

## 2018-04-14 RX ADMIN — LACTULOSE 10 G: 20 SOLUTION ORAL at 00:12

## 2018-04-14 RX ADMIN — HYDROMORPHONE HYDROCHLORIDE 1 MG: 2 INJECTION, SOLUTION INTRAMUSCULAR; INTRAVENOUS; SUBCUTANEOUS at 05:01

## 2018-04-14 RX ADMIN — CARVEDILOL 12.5 MG: 12.5 TABLET, FILM COATED ORAL at 18:10

## 2018-04-14 RX ADMIN — HYDROXYCHLOROQUINE SULFATE 200 MG: 200 TABLET, FILM COATED ORAL at 18:10

## 2018-04-14 NOTE — PROGRESS NOTES
General Surgery Daily Progress Note    Admit Date: 3/11/2018  Post-Operative Day: 2 Days Post-Op from Procedure(s):  DIAGNOSTIC LAPAROSCOPY WITH LAPAROSCOPIC ABDOMINAL [address] OUT      Subjective:     Last 24 hrs: Pt is having a lot of abd pain (surgical). Looks uncomfortable. No other issues. Objective:     Blood pressure (!) 135/91, pulse (!) 105, temperature 98.7 °F (37.1 °C), resp. rate 14, height 5' 5\" (1.651 m), weight 158 lb 11.2 oz (72 kg), SpO2 96 %, not currently breastfeeding. Temp (24hrs), Av.7 °F (37.1 °C), Min:97.8 °F (36.6 °C), Max:99.3 °F (37.4 °C)      _____________________  Physical Exam:     Alert and Oriented, x3, appears uncomfortable  Cardiovascular: tachy, RUE w edema - has thrill over access  Lungs:CTAB   Abdomen: soft, 2 Frank drains w/ ss drainage are present      Assessment:   Principal Problem:    Peritonitis ([address] (3/11/2018)    Active Problems:    Generalized abdominal pain (2018)      Other constipation (2018)      Other ascites (2018)            Plan: Will adjust dilaudid dose  Dialysis today  Further plans per med team    Data Review:    Recent Labs      18   0353   WBC  6.3   HGB  8.0*   HCT  27.2*   PLT  284     Recent Labs      18   0353   NA  141   K  4.7   CL  106   CO2  28   GLU  78   BUN  19   CREA  5.43*   CA  7.8*   PHOS  3.4     No results for input(s): AML, LPSE in the last 72 hours.         ______________________  Medications:    Current Facility-Administered Medications   Medication Dose Route Frequency    HYDROmorphone (DILAUDID) injection 2 mg  2 mg IntraVENous Q3H PRN    apixaban (ELIQUIS) tablet 5 mg  5 mg Oral BID    piperacillin-tazobactam (ZOSYN) 3.375 g in 0.9% sodium chloride (MBP/ADV) 100 mL  3.375 g IntraVENous Q12H    sodium chloride (NS) flush 5-10 mL  5-10 mL IntraVENous Q8H    sodium chloride (NS) flush 5-10 mL  5-10 mL IntraVENous PRN    lidocaine (PF) (XYLOCAINE) 10 mg/mL (1 %) injection 0.1 mL  0.1 mL SubCUTAneous PRN    midazolam (VERSED) injection 1 mg  1 mg IntraVENous PRN    epoetin pia (EPOGEN;PROCRIT) injection 10,000 Units  10,000 Units SubCUTAneous DIALYSIS TUE, THU & SAT    lactulose (CHRONULAC) solution 10 g  10 g Oral PRN    oxyCODONE IR (ROXICODONE) tablet 10 mg  10 mg Oral Q6H PRN    polyethylene glycol (MIRALAX) packet 17 g  17 g Oral DAILY    senna (SENOKOT) tablet 17.2 mg  2 Tab Oral QHS    balsam peru-castor oil (VENELEX)  mg/gram ointment   Topical Q8H    amLODIPine (NORVASC) tablet 5 mg  5 mg Oral DAILY    carvedilol (COREG) tablet 12.5 mg  12.5 mg Oral BID WITH MEALS    dextrose 5% and 0.9% NaCl infusion  15 mL/hr IntraVENous CONTINUOUS    hydrALAZINE (APRESOLINE) 20 mg/mL injection 5 mg  5 mg IntraVENous Q6H PRN    0.9% sodium chloride infusion 250 mL  250 mL IntraVENous PRN    glucose chewable tablet 16 g  4 Tab Oral PRN    dextrose (D50W) injection syrg 12.5-25 g  12.5-25 g IntraVENous PRN    glucagon (GLUCAGEN) injection 1 mg  1 mg IntraMUSCular PRN    predniSONE (DELTASONE) tablet 5 mg  5 mg Oral DAILY    pantoprazole (PROTONIX) tablet 40 mg  40 mg Oral ACB    cyanocobalamin (VITAMIN B12) tablet 2,500 mcg  2,500 mcg Oral DAILY    hydroxychloroquine (PLAQUENIL) tablet 200 mg  200 mg Oral BID    albuterol (PROVENTIL VENTOLIN) nebulizer solution 2.5 mg  2.5 mg Nebulization Q4H PRN    gemfibrozil (LOPID) tablet 300 mg  300 mg Oral DAILY    amitriptyline (ELAVIL) tablet 25 mg  25 mg Oral QHS    sodium chloride (NS) flush 5-10 mL  5-10 mL IntraVENous Q8H    sodium chloride (NS) flush 5-10 mL  5-10 mL IntraVENous PRN    ondansetron (ZOFRAN) injection 4 mg  4 mg IntraVENous Q4H PRN    arformoterol (BROVANA) neb solution 15 mcg  15 mcg Nebulization BID RT    And    budesonide (PULMICORT) 500 mcg/2 ml nebulizer suspension  500 mcg Nebulization BID RT       Oscar Albright NP  4/14/2018    Pt seen and examined  Agree with above  gen- Alert in NAD  Lungs- CTA  H- RRR  Abd- Soft with mild tenderness- Sukhdev Serosanginous   S/p abdominal drainage  Continue diet  Continue Pain control

## 2018-04-14 NOTE — PROGRESS NOTES
Hospitalist Progress Note            This is a 28-year-old female with multiple medical problems including SLE, end-stage renal disease (on hemodialysis, TTS), pulmonary embolism, on Eliquis (2012), hyperlipidemia, hypertension, recent Candida peritonitis, chronic bilateral pain.  She comes over here because of abdominal pain. Daily Progress Note: 4/14/2018    Assessment/Plan:   Acute peritonitis (e. coli) with sepsis  - also with recent candida peritonitis  - Peritoneal fluid heavy E coli on 3/11 and 3/18  - drain exchanged and upsized by IR on 4/5  - completed IV abx on 4/6/18  - has had multiple CT scans showing interval changes, but had to get washout due to loculations and abscess  - went to OR today for wash-out 2 days ago. Now with bilateral drains and serosanguinous output  - appreciate surgical recs       HTN  -continue current meds     Thrombocytopenia   -resolved      Bilateral LE edema  -likely third-spacing  -Dopplers negative for DVT      SLE  -continue home meds  -D/C'd Imuran, allopurinol by Nephro with acute infection and thrombocytopenia      History of PE  - on Eliquis since 2012  - per documentation from prior attending: \"appreciate discussion with PMD 3/13. The patient was seen by hematology in 2013 but seems was lost to follow up. I think at that time the plan was to stop anticoagulation if her SLE is stable. Spoke to Dr Kenny Karimi, he feels that the patient can come off Eliquis. His records do not suggest any recurrence of DVT/PE.  Spoke to Dr Yovani Schroeder, he has not seen the patient since >1 yr. If antiphospholipid antibodies negative then the patient can come off Eliquis\"  - 10/17 V/Q high probability for PE  - per Oncology the patient should be continued to Eliquis indefinitely      Hypophosphatemia:  -replete prn     ESRD  -on HD TTS  -RUE AVG was malfunctioning, s/p OR this AM     Anemia of chronic disease  -Transfused 2 units PRBC with HD 3/15     Hepatic steatosis    Severe protein calorie malnutrition  -consult to nutrition    Constipation:  -bowel regimen      Full code  Eliquis    Discussed with patient/family  Dispo: TBD       Subjective:     Patient seen after she went to the OR    Review of Systems:   As above    Objective:     Visit Vitals    /88 (BP 1 Location: Left arm, BP Patient Position: Lying right side)    Pulse (!) 106    Temp 98.9 °F (37.2 °C)    Resp 20    Ht 5' 5\" (1.651 m)    Wt 72 kg (158 lb 11.2 oz)    SpO2 98%    Breastfeeding No    BMI 26.41 kg/m2    O2 Flow Rate (L/min): 2 l/min O2 Device: Room air    Temp (24hrs), Av.7 °F (37.1 °C), Min:97.8 °F (36.6 °C), Max:99.3 °F (37.4 °C)          1901 -  0700  In: -   Out: 410 [Drains:410]    EXAM:  General: Chronically-ill appearing, NAD    HEENT: Anicteric sclerae, MMM  Neck:   Supple, trachea midline  Lungs:  CTA bilaterally. No Wheezing  Heart:  Regular rhythm,  No murmur   Abdomen: Dressing c/d/i. Tender.  Drains with serosanguineous drainage  Extremities: Warm, +peripheral edema  Neurologic:  normal speech, moves all extremities       Data Review:     Recent Results (from the past 24 hour(s))   GLUCOSE, POC    Collection Time: 18 11:22 AM   Result Value Ref Range    Glucose (POC) 87 65 - 100 mg/dL    Performed by Thanh Limon    GLUCOSE, POC    Collection Time: 18  4:44 PM   Result Value Ref Range    Glucose (POC) 78 65 - 100 mg/dL    Performed by [address] [place] [address], POC    Collection Time: 18  5:05 PM   Result Value Ref Range    Glucose (POC) 73 65 - 100 mg/dL    Performed by [address] [place] [address], POC    Collection Time: 18  5:24 PM   Result Value Ref Range    Glucose (POC) 75 65 - 100 mg/dL    Performed by [address] [place] [address], POC    Collection Time: 18  5:56 PM   Result Value Ref Range    Glucose (POC) 94 65 - 100 mg/dL    Performed by Kayren Kristen    GLUCOSE, POC    Collection Time: 18 10:39 PM Result Value Ref Range    Glucose (POC) 99 65 - 100 mg/dL    Performed by [address] [place] [address], [facility]    Collection Time: 04/14/18  6:37 AM   Result Value Ref Range    Glucose (POC) 89 65 - 100 mg/dL    Performed by Julissa Villafuerte        Principal Problem:    Peritonitis ([address] (3/11/2018)    Active Problems:    Generalized abdominal pain (4/4/2018)      Other constipation (4/4/2018)      Other ascites (4/4/2018)        Medications reviewed  Current Facility-Administered Medications   Medication Dose Route Frequency    HYDROmorphone (PF) (DILAUDID) injection 1 mg  1 mg IntraVENous Q2H PRN    apixaban (ELIQUIS) tablet 5 mg  5 mg Oral BID    piperacillin-tazobactam (ZOSYN) 3.375 g in 0.9% sodium chloride (MBP/ADV) 100 mL  3.375 g IntraVENous Q12H    sodium chloride (NS) flush 5-10 mL  5-10 mL IntraVENous Q8H    sodium chloride (NS) flush 5-10 mL  5-10 mL IntraVENous PRN    lidocaine (PF) (XYLOCAINE) 10 mg/mL (1 %) injection 0.1 mL  0.1 mL SubCUTAneous PRN    midazolam (VERSED) injection 1 mg  1 mg IntraVENous PRN    epoetin pia (EPOGEN;PROCRIT) injection 10,000 Units  10,000 Units SubCUTAneous DIALYSIS TUE, THU & SAT    lactulose (CHRONULAC) solution 10 g  10 g Oral PRN    oxyCODONE IR (ROXICODONE) tablet 10 mg  10 mg Oral Q6H PRN    polyethylene glycol (MIRALAX) packet 17 g  17 g Oral DAILY    senna (SENOKOT) tablet 17.2 mg  2 Tab Oral QHS    balsam peru-castor oil (VENELEX)  mg/gram ointment   Topical Q8H    amLODIPine (NORVASC) tablet 5 mg  5 mg Oral DAILY    carvedilol (COREG) tablet 12.5 mg  12.5 mg Oral BID WITH MEALS    dextrose 5% and 0.9% NaCl infusion  15 mL/hr IntraVENous CONTINUOUS    hydrALAZINE (APRESOLINE) 20 mg/mL injection 5 mg  5 mg IntraVENous Q6H PRN    0.9% sodium chloride infusion 250 mL  250 mL IntraVENous PRN    glucose chewable tablet 16 g  4 Tab Oral PRN    dextrose (D50W) injection syrg 12.5-25 g  12.5-25 g IntraVENous PRN    glucagon (GLUCAGEN) injection 1 mg  1 mg IntraMUSCular PRN    predniSONE (DELTASONE) tablet 5 mg  5 mg Oral DAILY    pantoprazole (PROTONIX) tablet 40 mg  40 mg Oral ACB    cyanocobalamin (VITAMIN B12) tablet 2,500 mcg  2,500 mcg Oral DAILY    hydroxychloroquine (PLAQUENIL) tablet 200 mg  200 mg Oral BID    albuterol (PROVENTIL VENTOLIN) nebulizer solution 2.5 mg  2.5 mg Nebulization Q4H PRN    gemfibrozil (LOPID) tablet 300 mg  300 mg Oral DAILY    amitriptyline (ELAVIL) tablet 25 mg  25 mg Oral QHS    sodium chloride (NS) flush 5-10 mL  5-10 mL IntraVENous Q8H    sodium chloride (NS) flush 5-10 mL  5-10 mL IntraVENous PRN    ondansetron (ZOFRAN) injection 4 mg  4 mg IntraVENous Q4H PRN    arformoterol (BROVANA) neb solution 15 mcg  15 mcg Nebulization BID RT    And    budesonide (PULMICORT) 500 mcg/2 ml nebulizer suspension  500 mcg Nebulization BID RT       Dmitri Gan MD

## 2018-04-15 LAB
GLUCOSE BLD STRIP.AUTO-MCNC: 106 MG/DL (ref 65–100)
GLUCOSE BLD STRIP.AUTO-MCNC: 106 MG/DL (ref 65–100)
GLUCOSE BLD STRIP.AUTO-MCNC: 144 MG/DL (ref 65–100)
SERVICE CMNT-IMP: ABNORMAL

## 2018-04-15 PROCEDURE — 74011250637 HC RX REV CODE- 250/637: Performed by: INTERNAL MEDICINE

## 2018-04-15 PROCEDURE — 74011250636 HC RX REV CODE- 250/636: Performed by: NURSE PRACTITIONER

## 2018-04-15 PROCEDURE — 74011250637 HC RX REV CODE- 250/637: Performed by: HOSPITALIST

## 2018-04-15 PROCEDURE — 74011000250 HC RX REV CODE- 250: Performed by: HOSPITALIST

## 2018-04-15 PROCEDURE — 74011250636 HC RX REV CODE- 250/636: Performed by: SURGERY

## 2018-04-15 PROCEDURE — 94640 AIRWAY INHALATION TREATMENT: CPT

## 2018-04-15 PROCEDURE — 74011000258 HC RX REV CODE- 258: Performed by: SURGERY

## 2018-04-15 PROCEDURE — 65270000029 HC RM PRIVATE

## 2018-04-15 PROCEDURE — 82962 GLUCOSE BLOOD TEST: CPT

## 2018-04-15 PROCEDURE — 94664 DEMO&/EVAL PT USE INHALER: CPT

## 2018-04-15 PROCEDURE — 77010033678 HC OXYGEN DAILY

## 2018-04-15 PROCEDURE — 74011636637 HC RX REV CODE- 636/637: Performed by: HOSPITALIST

## 2018-04-15 PROCEDURE — 74011250637 HC RX REV CODE- 250/637: Performed by: SURGERY

## 2018-04-15 RX ADMIN — CASTOR OIL AND BALSAM, PERU: 788; 87 OINTMENT TOPICAL at 13:58

## 2018-04-15 RX ADMIN — APIXABAN 5 MG: 5 TABLET, FILM COATED ORAL at 08:48

## 2018-04-15 RX ADMIN — PIPERACILLIN SODIUM,TAZOBACTAM SODIUM 3.38 G: 3; .375 INJECTION, POWDER, FOR SOLUTION INTRAVENOUS at 19:26

## 2018-04-15 RX ADMIN — Medication 10 ML: at 22:00

## 2018-04-15 RX ADMIN — CARVEDILOL 12.5 MG: 12.5 TABLET, FILM COATED ORAL at 08:49

## 2018-04-15 RX ADMIN — AMITRIPTYLINE HYDROCHLORIDE 25 MG: 10 TABLET, FILM COATED ORAL at 22:00

## 2018-04-15 RX ADMIN — HYDROMORPHONE HYDROCHLORIDE 2 MG: 2 INJECTION INTRAMUSCULAR; INTRAVENOUS; SUBCUTANEOUS at 19:40

## 2018-04-15 RX ADMIN — HYDROMORPHONE HYDROCHLORIDE 2 MG: 2 INJECTION INTRAMUSCULAR; INTRAVENOUS; SUBCUTANEOUS at 13:57

## 2018-04-15 RX ADMIN — CASTOR OIL AND BALSAM, PERU: 788; 87 OINTMENT TOPICAL at 07:33

## 2018-04-15 RX ADMIN — HYDROMORPHONE HYDROCHLORIDE 2 MG: 2 INJECTION INTRAMUSCULAR; INTRAVENOUS; SUBCUTANEOUS at 00:55

## 2018-04-15 RX ADMIN — BUDESONIDE 500 MCG: 0.5 INHALANT RESPIRATORY (INHALATION) at 22:46

## 2018-04-15 RX ADMIN — HYDROMORPHONE HYDROCHLORIDE 2 MG: 2 INJECTION INTRAMUSCULAR; INTRAVENOUS; SUBCUTANEOUS at 03:46

## 2018-04-15 RX ADMIN — HYDROMORPHONE HYDROCHLORIDE 2 MG: 2 INJECTION INTRAMUSCULAR; INTRAVENOUS; SUBCUTANEOUS at 22:34

## 2018-04-15 RX ADMIN — HYDROXYCHLOROQUINE SULFATE 200 MG: 200 TABLET, FILM COATED ORAL at 16:51

## 2018-04-15 RX ADMIN — PREDNISONE 5 MG: 5 TABLET ORAL at 08:49

## 2018-04-15 RX ADMIN — HYDROXYCHLOROQUINE SULFATE 200 MG: 200 TABLET, FILM COATED ORAL at 08:48

## 2018-04-15 RX ADMIN — HYDROMORPHONE HYDROCHLORIDE 2 MG: 2 INJECTION INTRAMUSCULAR; INTRAVENOUS; SUBCUTANEOUS at 07:33

## 2018-04-15 RX ADMIN — GEMFIBROZIL 300 MG: 600 TABLET ORAL at 08:49

## 2018-04-15 RX ADMIN — Medication 10 ML: at 07:34

## 2018-04-15 RX ADMIN — POLYETHYLENE GLYCOL 3350 17 G: 17 POWDER, FOR SOLUTION ORAL at 08:48

## 2018-04-15 RX ADMIN — HYDROMORPHONE HYDROCHLORIDE 2 MG: 2 INJECTION INTRAMUSCULAR; INTRAVENOUS; SUBCUTANEOUS at 11:00

## 2018-04-15 RX ADMIN — PANTOPRAZOLE SODIUM 40 MG: 40 TABLET, DELAYED RELEASE ORAL at 07:32

## 2018-04-15 RX ADMIN — Medication 10 ML: at 14:00

## 2018-04-15 RX ADMIN — ARFORMOTEROL TARTRATE 15 MCG: 15 SOLUTION RESPIRATORY (INHALATION) at 22:46

## 2018-04-15 RX ADMIN — Medication 2500 MCG: at 08:48

## 2018-04-15 RX ADMIN — PIPERACILLIN SODIUM,TAZOBACTAM SODIUM 3.38 G: 3; .375 INJECTION, POWDER, FOR SOLUTION INTRAVENOUS at 08:48

## 2018-04-15 RX ADMIN — STANDARDIZED SENNA CONCENTRATE AND DOCUSATE SODIUM 1 TABLET: 8.6; 5 TABLET, FILM COATED ORAL at 16:51

## 2018-04-15 RX ADMIN — APIXABAN 5 MG: 5 TABLET, FILM COATED ORAL at 16:51

## 2018-04-15 RX ADMIN — STANDARDIZED SENNA CONCENTRATE AND DOCUSATE SODIUM 1 TABLET: 8.6; 5 TABLET, FILM COATED ORAL at 08:49

## 2018-04-15 RX ADMIN — CARVEDILOL 12.5 MG: 12.5 TABLET, FILM COATED ORAL at 16:51

## 2018-04-15 RX ADMIN — CASTOR OIL AND BALSAM, PERU: 788; 87 OINTMENT TOPICAL at 22:00

## 2018-04-15 RX ADMIN — HYDROMORPHONE HYDROCHLORIDE 2 MG: 2 INJECTION INTRAMUSCULAR; INTRAVENOUS; SUBCUTANEOUS at 16:51

## 2018-04-15 NOTE — PROGRESS NOTES
Problem: Falls - Risk of  Goal: *Absence of Falls  Document Alex Fall Risk and appropriate interventions in the flowsheet.    Outcome: Progressing Towards Goal  Fall Risk Interventions:  Mobility Interventions: Communicate number of staff needed for ambulation/transfer, Mechanical lift, OT consult for ADLs, Patient to call before getting OOB, PT Consult for mobility concerns, PT Consult for assist device competence, Strengthening exercises (ROM-active/passive), Utilize walker, cane, or other assitive device, Utilize gait belt for transfers/ambulation    Mentation Interventions: Adequate sleep, hydration, pain control, Bed/chair exit alarm, Door open when patient unattended, Increase mobility    Medication Interventions: Bed/chair exit alarm, Evaluate medications/consider consulting pharmacy, Patient to call before getting OOB, Teach patient to arise slowly    Elimination Interventions: Call light in reach, Elevated toilet seat, Patient to call for help with toileting needs, Toilet paper/wipes in reach, Toileting schedule/hourly rounds

## 2018-04-15 NOTE — PROGRESS NOTES
Hospitalist Progress Note            This is a 71-year-old female with multiple medical problems including SLE, end-stage renal disease (on hemodialysis, TTS), pulmonary embolism, on Eliquis (2012), hyperlipidemia, hypertension, recent Candida peritonitis, chronic bilateral pain.  She comes over here because of abdominal pain. Daily Progress Note: 4/15/2018    Assessment/Plan:   Acute peritonitis (e. coli) with sepsis  - also with recent candida peritonitis  - peritoneal fluid heavy E coli on 3/11 and 3/18  - drain exchanged and upsized by IR on 4/5  - completed IV abx on 4/6/18  - has had multiple CT scans showing interval changes, but had to get washout due to loculations and abscess  - went to OR today for wash-out 3 days ago. Now with bilateral drains and serosanguinous output  - pain control  - appreciate surgical recs       HTN  -continue current meds     Thrombocytopenia   -resolved      Bilateral LE edema  -likely third-spacing  -Dopplers negative for DVT      SLE  -continue home meds  -D/C'd Imuran, allopurinol by Nephro with acute infection and thrombocytopenia      History of PE  - on Eliquis since 2012  - per documentation from prior attending: \"appreciate discussion with PMD 3/13. The patient was seen by hematology in 2013 but seems was lost to follow up. I think at that time the plan was to stop anticoagulation if her SLE is stable. Spoke to Dr Nguyễn Stack, he feels that the patient can come off Eliquis. His records do not suggest any recurrence of DVT/PE. Spoke to Dr Luis Molina, he has not seen the patient since >1 yr. If antiphospholipid antibodies negative then the patient can come off Eliquis\"  - 10/17 V/Q high probability for PE  - per Oncology the patient should be continued to Eliquis indefinitely      Hypophosphatemia:  -replete prn     ESRD  -on HD MWF now and missed F session.  Nurse has been in communication with dialysis providers to arrange  -RUE AVG was malfunctioning, s/p OR to fix     Anemia of chronic disease  -Transfused 2 units PRBC with HD 3/15     Hepatic steatosis    Severe protein calorie malnutrition  -consult to nutrition    Constipation:  -bowel regimen      Full code  Eliquis    Discussed with patient/family  Dispo: TBD       Subjective:     Patient seen after she went to the OR    Review of Systems:   As above    Objective:     Visit Vitals    /65 (BP 1 Location: Left arm, BP Patient Position: Sitting)    Pulse 88    Temp 98.2 °F (36.8 °C)    Resp 15    Ht 5' 5\" (1.651 m)    Wt 72.5 kg (159 lb 14.4 oz)    SpO2 98%    Breastfeeding No    BMI 26.61 kg/m2    O2 Flow Rate (L/min): 2 l/min O2 Device: Room air    Temp (24hrs), Av.5 °F (36.9 °C), Min:98.2 °F (36.8 °C), Max:98.8 °F (37.1 °C)    04/15 07 - 04/15 190  In: -   Out: 60 [Drains:60]    1901 - 04/15 0700  In: -   Out: 180 [Drains:180]    EXAM:  General: Chronically-ill appearing, NAD    HEENT: Anicteric sclerae, MMM  Neck:   Supple, trachea midline  Lungs:  CTA bilaterally. No Wheezing  Heart:  Regular rhythm,  No murmur   Abdomen: Dressing c/d/i. Tender.  Drains with serosanguineous drainage  Extremities: Warm, +peripheral edema  Neurologic:  normal speech, moves all extremities       Data Review:     Recent Results (from the past 24 hour(s))   GLUCOSE, POC    Collection Time: 18  5:01 PM   Result Value Ref Range    Glucose (POC) 221 (H) 65 - 100 mg/dL    Performed by Mercinessa MACIAS(CON)    GLUCOSE, POC    Collection Time: 18  9:30 PM   Result Value Ref Range    Glucose (POC) 92 65 - 100 mg/dL    Performed by Lola Bermeo    GLUCOSE, POC    Collection Time: 04/15/18  6:36 AM   Result Value Ref Range    Glucose (POC) 106 (H) 65 - 100 mg/dL    Performed by Lola Bermeo        Principal Problem:    Peritonitis ([address] (3/11/2018)    Active Problems:    Generalized abdominal pain (2018)      Other constipation (2018)      Other ascites (2018)        Medications reviewed  Current Facility-Administered Medications   Medication Dose Route Frequency    HYDROmorphone (DILAUDID) injection 2 mg  2 mg IntraVENous Q3H PRN    senna-docusate (PERICOLACE) 8.6-50 mg per tablet 1 Tab  1 Tab Oral BID    apixaban (ELIQUIS) tablet 5 mg  5 mg Oral BID    piperacillin-tazobactam (ZOSYN) 3.375 g in 0.9% sodium chloride (MBP/ADV) 100 mL  3.375 g IntraVENous Q12H    sodium chloride (NS) flush 5-10 mL  5-10 mL IntraVENous Q8H    sodium chloride (NS) flush 5-10 mL  5-10 mL IntraVENous PRN    lidocaine (PF) (XYLOCAINE) 10 mg/mL (1 %) injection 0.1 mL  0.1 mL SubCUTAneous PRN    midazolam (VERSED) injection 1 mg  1 mg IntraVENous PRN    epoetin pia (EPOGEN;PROCRIT) injection 10,000 Units  10,000 Units SubCUTAneous DIALYSIS TUE, THU & SAT    lactulose (CHRONULAC) solution 10 g  10 g Oral PRN    oxyCODONE IR (ROXICODONE) tablet 10 mg  10 mg Oral Q6H PRN    polyethylene glycol (MIRALAX) packet 17 g  17 g Oral DAILY    balsam peru-castor oil (VENELEX)  mg/gram ointment   Topical Q8H    amLODIPine (NORVASC) tablet 5 mg  5 mg Oral DAILY    carvedilol (COREG) tablet 12.5 mg  12.5 mg Oral BID WITH MEALS    dextrose 5% and 0.9% NaCl infusion  15 mL/hr IntraVENous CONTINUOUS    hydrALAZINE (APRESOLINE) 20 mg/mL injection 5 mg  5 mg IntraVENous Q6H PRN    0.9% sodium chloride infusion 250 mL  250 mL IntraVENous PRN    glucose chewable tablet 16 g  4 Tab Oral PRN    dextrose (D50W) injection syrg 12.5-25 g  12.5-25 g IntraVENous PRN    glucagon (GLUCAGEN) injection 1 mg  1 mg IntraMUSCular PRN    predniSONE (DELTASONE) tablet 5 mg  5 mg Oral DAILY    pantoprazole (PROTONIX) tablet 40 mg  40 mg Oral ACB    cyanocobalamin (VITAMIN B12) tablet 2,500 mcg  2,500 mcg Oral DAILY    hydroxychloroquine (PLAQUENIL) tablet 200 mg  200 mg Oral BID    albuterol (PROVENTIL VENTOLIN) nebulizer solution 2.5 mg  2.5 mg Nebulization Q4H PRN    gemfibrozil (LOPID) tablet 300 mg  300 mg Oral DAILY    amitriptyline (ELAVIL) tablet 25 mg  25 mg Oral QHS    sodium chloride (NS) flush 5-10 mL  5-10 mL IntraVENous Q8H    sodium chloride (NS) flush 5-10 mL  5-10 mL IntraVENous PRN    ondansetron (ZOFRAN) injection 4 mg  4 mg IntraVENous Q4H PRN    arformoterol (BROVANA) neb solution 15 mcg  15 mcg Nebulization BID RT    And    budesonide (PULMICORT) 500 mcg/2 ml nebulizer suspension  500 mcg Nebulization BID RT       Yolanda Milton MD

## 2018-04-15 NOTE — PROGRESS NOTES
Patient had last dialysis on Wednesday. [address] was notified. Floor RN spoke to Kimberly Rosas  RN to  notified about the situation. RN didn't not received a definite answer at this time, will continue to follow up.

## 2018-04-15 NOTE — DIALYSIS
[employer], arm swollen. Difficulty with establishing access, tried to cannulate x2 nurses, not successful. Very slow aspiration, almost impossible flushes. Dr Vandana Grewal made aware, recommended night shift [facility] nurse to try to cannulate again. The info passed to [facility] call nurse.

## 2018-04-15 NOTE — PROGRESS NOTES
Patient name: Garrison Urena  MRN: [id number]    Nephrology Progress note:    Assessment:    ESRD: on HD [facility] . Getting HD MWF while here. She was supposed to get HD Friday but apparently never did. Floor nurse called to make me aware today. Plan a short HD and then routine HD in AM.    AVG - poor function    E. Coli Bacteremia: source intra-abd source/ E. Coli peritonitis. IV zosyn. Repeat Bld Cx 3/11-> Neg. Recent hx of Candida peritonitis. 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: Hgb remains below goal - on EPO    SHPT: Hypophosphatemia-> give Neutra-Phos    Protein malnutrition    Hypoglycemia: wean D5NS fluids as tolerated    Edema: 3rd spacing-> increase UF on HD          Subjective   resting  Exam:  Visit Vitals    /68 (BP 1 Location: Left arm, BP Patient Position: At rest)    Pulse 90    Temp 98.5 °F (36.9 °C)    Resp 14    Ht 5' 5\" (1.651 m)    Wt 72.5 kg (159 lb 14.4 oz)    SpO2 98%    Breastfeeding No    BMI 26.61 kg/m2     Wt Readings from Last 3 Encounters:   04/15/18 72.5 kg (159 lb 14.4 oz)   03/05/18 68 kg (150 lb)   01/29/18 69.4 kg (153 lb)       Intake/Output Summary (Last 24 hours) at 04/15/18 1116  Last data filed at 04/15/18 0741   Gross per 24 hour   Intake                0 ml   Output              130 ml   Net             -130 ml       NAD  2+ edema      Labs/Data:    Lab Results   Component Value Date/Time    WBC 6.3 04/13/2018 03:53 AM Hemoglobin (POC) 7.8 (L) 01/19/2018 12:30 PM    HGB 8.0 (L) 04/13/2018 03:53 AM    Hematocrit (POC) 23 (L) 01/19/2018 12:30 PM    HCT 27.2 (L) 04/13/2018 03:53 AM    PLATELET 380 88/46/4384 03:53 AM    MCV 93.8 04/13/2018 03:53 AM       Lab Results   Component Value Date/Time    Sodium 141 04/13/2018 03:53 AM    Potassium 4.7 04/13/2018 03:53 AM    Chloride 106 04/13/2018 03:53 AM    CO2 28 04/13/2018 03:53 AM    Anion gap 7 04/13/2018 03:53 AM    Glucose 78 04/13/2018 03:53 AM    BUN 19 04/13/2018 03:53 AM    Creatinine 5.43 (H) 04/13/2018 03:53 AM    BUN/Creatinine ratio 3 (L) 04/13/2018 03:53 AM    GFR est AA 11 (L) 04/13/2018 03:53 AM    GFR est non-AA 9 (L) 04/13/2018 03:53 AM    Calcium 7.8 (L) 04/13/2018 03:53 AM        Discussed with patient

## 2018-04-16 LAB
GLUCOSE BLD STRIP.AUTO-MCNC: 101 MG/DL (ref 65–100)
GLUCOSE BLD STRIP.AUTO-MCNC: 82 MG/DL (ref 65–100)
GLUCOSE BLD STRIP.AUTO-MCNC: 99 MG/DL (ref 65–100)
SERVICE CMNT-IMP: ABNORMAL
SERVICE CMNT-IMP: NORMAL
SERVICE CMNT-IMP: NORMAL

## 2018-04-16 PROCEDURE — 74011000258 HC RX REV CODE- 258: Performed by: SURGERY

## 2018-04-16 PROCEDURE — 74011250637 HC RX REV CODE- 250/637: Performed by: INTERNAL MEDICINE

## 2018-04-16 PROCEDURE — 65270000029 HC RM PRIVATE

## 2018-04-16 PROCEDURE — 74011636637 HC RX REV CODE- 636/637: Performed by: HOSPITALIST

## 2018-04-16 PROCEDURE — 74011250637 HC RX REV CODE- 250/637: Performed by: HOSPITALIST

## 2018-04-16 PROCEDURE — 36415 COLL VENOUS BLD VENIPUNCTURE: CPT | Performed by: INTERNAL MEDICINE

## 2018-04-16 PROCEDURE — 83735 ASSAY OF MAGNESIUM: CPT | Performed by: INTERNAL MEDICINE

## 2018-04-16 PROCEDURE — 74011250636 HC RX REV CODE- 250/636: Performed by: SURGERY

## 2018-04-16 PROCEDURE — 74011250636 HC RX REV CODE- 250/636: Performed by: NURSE PRACTITIONER

## 2018-04-16 PROCEDURE — 80053 COMPREHEN METABOLIC PANEL: CPT | Performed by: INTERNAL MEDICINE

## 2018-04-16 PROCEDURE — 82962 GLUCOSE BLOOD TEST: CPT

## 2018-04-16 PROCEDURE — 74011250637 HC RX REV CODE- 250/637: Performed by: SURGERY

## 2018-04-16 RX ADMIN — Medication 10 ML: at 21:47

## 2018-04-16 RX ADMIN — HYDROXYCHLOROQUINE SULFATE 200 MG: 200 TABLET, FILM COATED ORAL at 18:43

## 2018-04-16 RX ADMIN — HYDROMORPHONE HYDROCHLORIDE 2 MG: 2 INJECTION INTRAMUSCULAR; INTRAVENOUS; SUBCUTANEOUS at 15:47

## 2018-04-16 RX ADMIN — Medication 10 ML: at 07:09

## 2018-04-16 RX ADMIN — Medication 2500 MCG: at 09:26

## 2018-04-16 RX ADMIN — CARVEDILOL 12.5 MG: 12.5 TABLET, FILM COATED ORAL at 09:26

## 2018-04-16 RX ADMIN — APIXABAN 5 MG: 5 TABLET, FILM COATED ORAL at 09:26

## 2018-04-16 RX ADMIN — PANTOPRAZOLE SODIUM 40 MG: 40 TABLET, DELAYED RELEASE ORAL at 07:20

## 2018-04-16 RX ADMIN — POLYETHYLENE GLYCOL 3350 17 G: 17 POWDER, FOR SOLUTION ORAL at 09:26

## 2018-04-16 RX ADMIN — HYDROMORPHONE HYDROCHLORIDE 2 MG: 2 INJECTION INTRAMUSCULAR; INTRAVENOUS; SUBCUTANEOUS at 11:42

## 2018-04-16 RX ADMIN — HYDROMORPHONE HYDROCHLORIDE 2 MG: 2 INJECTION INTRAMUSCULAR; INTRAVENOUS; SUBCUTANEOUS at 01:14

## 2018-04-16 RX ADMIN — HYDROMORPHONE HYDROCHLORIDE 2 MG: 2 INJECTION INTRAMUSCULAR; INTRAVENOUS; SUBCUTANEOUS at 18:43

## 2018-04-16 RX ADMIN — PIPERACILLIN SODIUM,TAZOBACTAM SODIUM 3.38 G: 3; .375 INJECTION, POWDER, FOR SOLUTION INTRAVENOUS at 07:20

## 2018-04-16 RX ADMIN — PREDNISONE 5 MG: 5 TABLET ORAL at 09:27

## 2018-04-16 RX ADMIN — CASTOR OIL AND BALSAM, PERU: 788; 87 OINTMENT TOPICAL at 07:10

## 2018-04-16 RX ADMIN — AMITRIPTYLINE HYDROCHLORIDE 25 MG: 10 TABLET, FILM COATED ORAL at 21:46

## 2018-04-16 RX ADMIN — CARVEDILOL 12.5 MG: 12.5 TABLET, FILM COATED ORAL at 18:43

## 2018-04-16 RX ADMIN — STANDARDIZED SENNA CONCENTRATE AND DOCUSATE SODIUM 1 TABLET: 8.6; 5 TABLET, FILM COATED ORAL at 18:43

## 2018-04-16 RX ADMIN — HYDROMORPHONE HYDROCHLORIDE 2 MG: 2 INJECTION INTRAMUSCULAR; INTRAVENOUS; SUBCUTANEOUS at 07:07

## 2018-04-16 RX ADMIN — APIXABAN 5 MG: 5 TABLET, FILM COATED ORAL at 18:43

## 2018-04-16 RX ADMIN — CASTOR OIL AND BALSAM, PERU: 788; 87 OINTMENT TOPICAL at 21:48

## 2018-04-16 RX ADMIN — AMLODIPINE BESYLATE 5 MG: 5 TABLET ORAL at 09:27

## 2018-04-16 RX ADMIN — STANDARDIZED SENNA CONCENTRATE AND DOCUSATE SODIUM 1 TABLET: 8.6; 5 TABLET, FILM COATED ORAL at 09:26

## 2018-04-16 RX ADMIN — HYDROMORPHONE HYDROCHLORIDE 2 MG: 2 INJECTION INTRAMUSCULAR; INTRAVENOUS; SUBCUTANEOUS at 21:47

## 2018-04-16 RX ADMIN — PIPERACILLIN SODIUM,TAZOBACTAM SODIUM 3.38 G: 3; .375 INJECTION, POWDER, FOR SOLUTION INTRAVENOUS at 20:05

## 2018-04-16 RX ADMIN — GEMFIBROZIL 300 MG: 600 TABLET ORAL at 09:26

## 2018-04-16 RX ADMIN — HYDROXYCHLOROQUINE SULFATE 200 MG: 200 TABLET, FILM COATED ORAL at 09:26

## 2018-04-16 NOTE — PROGRESS NOTES
General Surgery at [facility]    Pt reports abdomen is \"sore\".   Tolerating reg diet well  Patient Vitals for the past 12 hrs:   Temp Pulse Resp BP SpO2   18 1513 98.5 °F (36.9 °C) 96 14 135/90 100 %   18 0923 98.2 °F (36.8 °C) 93 14 127/79 100 %     Temp (24hrs), Av.4 °F (36.9 °C), Min:98.2 °F (36.8 °C), Max:98.5 °F (36.9 °C)       Date 18 0700 - 18 0659   Shift 4275-2052 7896-1590 4264-1401 24 Hour Total   I  N  T  A  K  E   Shift Total  (mL/kg)       O  U  T  P  U  T   Drains 20   20    Shift Total  (mL/kg) 20  (0.3)   20  (0.3)   Weight (kg) 73.5 73.5 73.5 73.5     Gen NAD  Abd Flat, soft, generalized tenderness, both drains serosanguinous output    No cultutres    Assessment:   Hospital Problems as of 2018  Date Reviewed: 3/11/2018          Codes Class Noted - Resolved POA    Generalized abdominal pain ICD-10-CM: R10.84  ICD-9-CM: 789.07  2018 - Present Unknown        Other constipation ICD-10-CM: K59.09  ICD-9-CM: 564.09  2018 - Present Unknown        Other ascites ICD-10-CM: R18.8  ICD-9-CM: 789.59  2018 - Present Unknown        * (Principal)Peritonitis ([facility] [address] ICD-10-CM: K65.9  ICD-9-CM: 567.9  3/11/2018 - Present Yes              4 Days Post-Op  Procedure(s):  DIAGNOSTIC LAPAROSCOPY WITH LAPAROSCOPIC ABDOMINAL [address] OUT    serosang output    Rec/Plan:  Continue abx    Signed By: Magdalena Watson MD     2018

## 2018-04-16 NOTE — PROGRESS NOTES
PT Note:    Chart reviewed and spoke with nursing. Patient is now starting dialysis. Will follow up as time allows. Thank you.

## 2018-04-16 NOTE — PROGRESS NOTES
Hospitalist Progress Note            This is a 61-year-old female with multiple medical problems including SLE, end-stage renal disease (on hemodialysis, TTS), pulmonary embolism, on Eliquis (2012), hyperlipidemia, hypertension, recent Candida peritonitis, chronic bilateral pain.  She comes over here because of abdominal pain. Daily Progress Note: 4/16/2018    Assessment/Plan:   Acute peritonitis (e. coli) with sepsis  - also with recent candida peritonitis  - peritoneal fluid heavy E coli on 3/11 and 3/18  - drain exchanged and upsized by IR on 4/5  - completed IV abx on 4/6/18  - has had multiple CT scans showing interval changes, but had to get washout due to loculations and abscess  - went to OR last week for the washout. Now has bilateral drains with erosanguinous output  - pain control  - appreciate Surgery recs       HTN  -continue current meds     Thrombocytopenia   -resolved      Bilateral LE edema  -likely third-spacing  -Dopplers negative for DVT      SLE  -continue home meds  -D/C'd Imuran, allopurinol by Nephro with acute infection and thrombocytopenia      History of PE  - on Eliquis since 2012  - per documentation from prior attending: \"appreciate discussion with PMD 3/13. The patient was seen by hematology in 2013 but seems was lost to follow up. I think at that time the plan was to stop anticoagulation if her SLE is stable. Spoke to Dr Pedro Macdonald, he feels that the patient can come off Eliquis. His records do not suggest any recurrence of DVT/PE.  Spoke to Dr Varun Hanna, he has not seen the patient since >1 yr. If antiphospholipid antibodies negative then the patient can come off Eliquis\"  - 10/17 V/Q high probability for PE  - per Oncology the patient should be continued to Eliquis indefinitely      Hypophosphatemia:  -replete prn     ESRD  -on HD MWF   -RUE AVG was malfunctioning, s/p OR to fix  -Nephrology following     Anemia of chronic disease  -Transfused 2 units PRBC with HD 3/15     Hepatic steatosis    Severe protein calorie malnutrition  -consult to nutrition    Constipation:  -bowel regimen      Full code  Eliquis    Discussed with patient, Nurse  Dispo: TBD       Subjective:     Patient seen after she went to the OR    Review of Systems:   As above    Objective:     Visit Vitals    /81 (BP 1 Location: Left arm, BP Patient Position: At rest;Supine)    Pulse 86  Comment: 98    Temp 98.4 °F (36.9 °C)    Resp 15    Ht 5' 5\" (1.651 m)    Wt 73.5 kg (162 lb)    SpO2 98%    Breastfeeding No    BMI 26.96 kg/m2    O2 Flow Rate (L/min): 2 l/min O2 Device: Room air    Temp (24hrs), Av.2 °F (36.8 °C), Min:97.8 °F (36.6 °C), Max:98.5 °F (36.9 °C)     07 -  1900  In: -   Out: 20 [Drains:20]     190 -  0700  In: -   Out: 150 [Drains:150]    EXAM:  General: Chronically-ill appearing, NAD    HEENT: Anicteric sclerae, MMM  Neck:   Supple, trachea midline  Lungs:  CTA bilaterally. No Wheezing  Heart:  Regular rhythm and rate. No murmur   Abdomen: Dressing c/d/i. Tender.  Drains with serosanguineous drainage  Extremities: Warm, +peripheral edema  Neurologic:  normal speech, moves all extremities       Data Review:     Recent Results (from the past 24 hour(s))   GLUCOSE, POC    Collection Time: 04/15/18  3:57 PM   Result Value Ref Range    Glucose (POC) 106 (H) 65 - 100 mg/dL    Performed by Jay Saravia    GLUCOSE, POC    Collection Time: 04/15/18  9:48 PM   Result Value Ref Range    Glucose (POC) 144 (H) 65 - 100 mg/dL    Performed by Natalee Sow    GLUCOSE, POC    Collection Time: 18  6:19 AM   Result Value Ref Range    Glucose (POC) 82 65 - 100 mg/dL    Performed by Natalee Sow        Principal Problem:    Peritonitis ([address] (3/11/2018)    Active Problems:    Generalized abdominal pain (2018)      Other constipation (2018)      Other ascites (2018)        Medications reviewed  Current Facility-Administered Medications   Medication Dose Route Frequency    HYDROmorphone (DILAUDID) injection 2 mg  2 mg IntraVENous Q3H PRN    senna-docusate (PERICOLACE) 8.6-50 mg per tablet 1 Tab  1 Tab Oral BID    apixaban (ELIQUIS) tablet 5 mg  5 mg Oral BID    piperacillin-tazobactam (ZOSYN) 3.375 g in 0.9% sodium chloride (MBP/ADV) 100 mL  3.375 g IntraVENous Q12H    sodium chloride (NS) flush 5-10 mL  5-10 mL IntraVENous Q8H    sodium chloride (NS) flush 5-10 mL  5-10 mL IntraVENous PRN    lidocaine (PF) (XYLOCAINE) 10 mg/mL (1 %) injection 0.1 mL  0.1 mL SubCUTAneous PRN    midazolam (VERSED) injection 1 mg  1 mg IntraVENous PRN    epoetin pia (EPOGEN;PROCRIT) injection 10,000 Units  10,000 Units SubCUTAneous DIALYSIS TUE, THU & SAT    lactulose (CHRONULAC) solution 10 g  10 g Oral PRN    oxyCODONE IR (ROXICODONE) tablet 10 mg  10 mg Oral Q6H PRN    polyethylene glycol (MIRALAX) packet 17 g  17 g Oral DAILY    balsam peru-castor oil (VENELEX)  mg/gram ointment   Topical Q8H    amLODIPine (NORVASC) tablet 5 mg  5 mg Oral DAILY    carvedilol (COREG) tablet 12.5 mg  12.5 mg Oral BID WITH MEALS    dextrose 5% and 0.9% NaCl infusion  15 mL/hr IntraVENous CONTINUOUS    hydrALAZINE (APRESOLINE) 20 mg/mL injection 5 mg  5 mg IntraVENous Q6H PRN    0.9% sodium chloride infusion 250 mL  250 mL IntraVENous PRN    glucose chewable tablet 16 g  4 Tab Oral PRN    dextrose (D50W) injection syrg 12.5-25 g  12.5-25 g IntraVENous PRN    glucagon (GLUCAGEN) injection 1 mg  1 mg IntraMUSCular PRN    predniSONE (DELTASONE) tablet 5 mg  5 mg Oral DAILY    pantoprazole (PROTONIX) tablet 40 mg  40 mg Oral ACB    cyanocobalamin (VITAMIN B12) tablet 2,500 mcg  2,500 mcg Oral DAILY    hydroxychloroquine (PLAQUENIL) tablet 200 mg  200 mg Oral BID    albuterol (PROVENTIL VENTOLIN) nebulizer solution 2.5 mg  2.5 mg Nebulization Q4H PRN    gemfibrozil (LOPID) tablet 300 mg  300 mg Oral DAILY    amitriptyline (ELAVIL) tablet 25 mg  25 mg Oral QHS    sodium chloride (NS) flush 5-10 mL  5-10 mL IntraVENous Q8H    sodium chloride (NS) flush 5-10 mL  5-10 mL IntraVENous PRN    ondansetron (ZOFRAN) injection 4 mg  4 mg IntraVENous Q4H PRN    arformoterol (BROVANA) neb solution 15 mcg  15 mcg Nebulization BID RT    And    budesonide (PULMICORT) 500 mcg/2 ml nebulizer suspension  500 mcg Nebulization BID RT       Zeus Colin MD

## 2018-04-16 NOTE — PROGRESS NOTES
Progress Note    Patient: Marjorie Arango MRN: [id number]  SSN: [id number]    YOB: 1985  Age: 29 y.o. Sex: female      Admit Date: 3/11/2018    3 Days Post-Op    Procedure:  Procedure(s):  DIAGNOSTIC LAPAROSCOPY WITH LAPAROSCOPIC ABDOMINAL [address] OUT     Subjective:     Patient still having pain but much more comfortable than yesterday. Objective:     Visit Vitals    /75 (BP 1 Location: Left arm, BP Patient Position: At rest)    Pulse 92    Temp 97.8 °F (36.6 °C)    Resp 14    Ht 5' 5\" (1.651 m)    Wt 159 lb 14.4 oz (72.5 kg)    SpO2 96%    Breastfeeding No    BMI 26.61 kg/m2       Temp (24hrs), Av.2 °F (36.8 °C), Min:97.8 °F (36.6 °C), Max:98.5 °F (36.9 °C)      Physical Exam:    gen- alert in NAD  Abd- S/NT/ND drains serosanginousn    Data Review: images and reports reviewed    Lab Review: All lab results for the last 24 hours reviewed. Recent Results (from the past 24 hour(s))   GLUCOSE, POC    Collection Time: 04/15/18  6:36 AM   Result Value Ref Range    Glucose (POC) 106 (H) 65 - 100 mg/dL    Performed by Emilee Enriquez    GLUCOSE, POC    Collection Time: 04/15/18  3:57 PM   Result Value Ref Range    Glucose (POC) 106 (H) 65 - 100 mg/dL    Performed by Hector Wilhelm    GLUCOSE, POC    Collection Time: 04/15/18  9:48 PM   Result Value Ref Range    Glucose (POC) 144 (H) 65 - 100 mg/dL    Performed by Emilee Enriquez        Assessment:     Hospital Problems  Date Reviewed: 3/11/2018          Codes Class Noted POA    Generalized abdominal pain ICD-10-CM: R10.84  ICD-9-CM: 789.07  2018 Unknown        Other constipation ICD-10-CM: K59.09  ICD-9-CM: 564.09  2018 Unknown        Other ascites ICD-10-CM: R18.8  ICD-9-CM: 789.59  2018 Unknown        * (Principal)Peritonitis ([address] ICD-10-CM: K65.9  ICD-9-CM: 567.9  3/11/2018 Yes              Plan/Recommendations/Medical Decision Making:   Improving  Continue current pain control and diet   Continue drains.      Signed By: Janes Aguiar MD     April 15, 2018

## 2018-04-16 NOTE — PROGRESS NOTES
Care management is continuing to follow for transitions of care. CM unable to determine patients' needs at discharge, due to therapy being unable to work with patient, due to patients' HD scheduling conflicts. Cm will continue to follow.   [employer] , [place]

## 2018-04-16 NOTE — PROGRESS NOTES
Patient name: Heath Hernandez  MRN: [id number]    Nephrology Progress note:    Assessment:  ESRD: on HD [facility] . Getting HD MWF while here. underdialyzed due to scheduling issues  E. Coli Bacteremia: source intra-abd source/ E. Coli peritonitis. IV zosyn. Repeat Bld Cx 3/11-> Neg. Recent hx of Candida peritonitis. S/p drains    HTN:    Hx of PE: on Eliquis  Lupus: Anemia 2 to ESRD/Lupus: Hgb remains below goal - on EPO  SHPT: Hypophosphatemia-> give Neutra-Phos  Protein malnutrition  Hypoglycemia:  Edema: 3rd spacing-> increase UF on HD    See on HD at 11:40 AM. Using R AV access, 450 QB. 3K/2.5 Ca bath. UF of 1.5 Kg attempted. Tolerating well so far. 3 hr duration    Plan: Will plan HD tomm again to bring her back to TTS schedule    Subjective   Feels OK. Seen/examined on HD. No acute c/o.  Tolerating hd well    Exam:  Visit Vitals    /79 (BP 1 Location: Left arm, BP Patient Position: At rest)    Pulse 93    Temp 98.2 °F (36.8 °C)    Resp 14    Ht 5' 5\" (1.651 m)    Wt 73.5 kg (162 lb)    SpO2 100%    Breastfeeding No    BMI 26.96 kg/m2     Wt Readings from Last 3 Encounters:   04/16/18 73.5 kg (162 lb)   03/05/18 68 kg (150 lb)   01/29/18 69.4 kg (153 lb)       Intake/Output Summary (Last 24 hours) at 04/16/18 1254  Last data filed at 04/16/18 0705   Gross per 24 hour   Intake                0 ml   Output              110 ml   Net             -110 ml     NAD  Clear  RRR, no rub  No edema  AOx3  R AV access patent    Labs/Data:    Lab Results Component Value Date/Time    WBC 6.3 04/13/2018 03:53 AM    Hemoglobin (POC) 7.8 (L) 01/19/2018 12:30 PM    HGB 8.0 (L) 04/13/2018 03:53 AM    Hematocrit (POC) 23 (L) 01/19/2018 12:30 PM    HCT 27.2 (L) 04/13/2018 03:53 AM    PLATELET 075 69/60/1451 03:53 AM    MCV 93.8 04/13/2018 03:53 AM       Lab Results   Component Value Date/Time    Sodium 141 04/13/2018 03:53 AM    Potassium 4.7 04/13/2018 03:53 AM    Chloride 106 04/13/2018 03:53 AM    CO2 28 04/13/2018 03:53 AM    Anion gap 7 04/13/2018 03:53 AM    Glucose 78 04/13/2018 03:53 AM    BUN 19 04/13/2018 03:53 AM    Creatinine 5.43 (H) 04/13/2018 03:53 AM    BUN/Creatinine ratio 3 (L) 04/13/2018 03:53 AM    GFR est AA 11 (L) 04/13/2018 03:53 AM    GFR est non-AA 9 (L) 04/13/2018 03:53 AM    Calcium 7.8 (L) 04/13/2018 03:53 AM        Discussed with patient Barney Sapp MD  [id number] [employer]

## 2018-04-17 LAB
ALBUMIN SERPL-MCNC: 1.1 G/DL (ref 3.5–5)
ALBUMIN/GLOB SERPL: 0.3 {RATIO} (ref 1.1–2.2)
ALP SERPL-CCNC: 81 U/L (ref 45–117)
ALT SERPL-CCNC: <6 U/L (ref 12–78)
ANION GAP SERPL CALC-SCNC: 8 MMOL/L (ref 5–15)
AST SERPL-CCNC: 13 U/L (ref 15–37)
BILIRUB SERPL-MCNC: 0.2 MG/DL (ref 0.2–1)
BUN SERPL-MCNC: 19 MG/DL (ref 6–20)
BUN/CREAT SERPL: 3 (ref 12–20)
CALCIUM SERPL-MCNC: 7.4 MG/DL (ref 8.5–10.1)
CHLORIDE SERPL-SCNC: 110 MMOL/L (ref 97–108)
CO2 SERPL-SCNC: 26 MMOL/L (ref 21–32)
CREAT SERPL-MCNC: 5.61 MG/DL (ref 0.55–1.02)
GLOBULIN SER CALC-MCNC: 4 G/DL (ref 2–4)
GLUCOSE BLD STRIP.AUTO-MCNC: 104 MG/DL (ref 65–100)
GLUCOSE BLD STRIP.AUTO-MCNC: 114 MG/DL (ref 65–100)
GLUCOSE BLD STRIP.AUTO-MCNC: 270 MG/DL (ref 65–100)
GLUCOSE BLD STRIP.AUTO-MCNC: 93 MG/DL (ref 65–100)
GLUCOSE SERPL-MCNC: 101 MG/DL (ref 65–100)
MAGNESIUM SERPL-MCNC: 1.7 MG/DL (ref 1.6–2.4)
POTASSIUM SERPL-SCNC: 4.7 MMOL/L (ref 3.5–5.1)
PROT SERPL-MCNC: 5.1 G/DL (ref 6.4–8.2)
SERVICE CMNT-IMP: ABNORMAL
SERVICE CMNT-IMP: NORMAL
SODIUM SERPL-SCNC: 144 MMOL/L (ref 136–145)

## 2018-04-17 PROCEDURE — 74011250636 HC RX REV CODE- 250/636: Performed by: SURGERY

## 2018-04-17 PROCEDURE — 74011250637 HC RX REV CODE- 250/637: Performed by: SURGERY

## 2018-04-17 PROCEDURE — 90935 HEMODIALYSIS ONE EVALUATION: CPT

## 2018-04-17 PROCEDURE — 74011250637 HC RX REV CODE- 250/637: Performed by: INTERNAL MEDICINE

## 2018-04-17 PROCEDURE — 74011000258 HC RX REV CODE- 258: Performed by: SURGERY

## 2018-04-17 PROCEDURE — 74011000250 HC RX REV CODE- 250: Performed by: HOSPITALIST

## 2018-04-17 PROCEDURE — 74011250637 HC RX REV CODE- 250/637: Performed by: NURSE PRACTITIONER

## 2018-04-17 PROCEDURE — 97116 GAIT TRAINING THERAPY: CPT

## 2018-04-17 PROCEDURE — 74011250636 HC RX REV CODE- 250/636: Performed by: NURSE PRACTITIONER

## 2018-04-17 PROCEDURE — 74011250637 HC RX REV CODE- 250/637: Performed by: HOSPITALIST

## 2018-04-17 PROCEDURE — 74011636637 HC RX REV CODE- 636/637: Performed by: HOSPITALIST

## 2018-04-17 PROCEDURE — 82962 GLUCOSE BLOOD TEST: CPT

## 2018-04-17 PROCEDURE — 94640 AIRWAY INHALATION TREATMENT: CPT

## 2018-04-17 PROCEDURE — 65270000029 HC RM PRIVATE

## 2018-04-17 PROCEDURE — 97530 THERAPEUTIC ACTIVITIES: CPT

## 2018-04-17 RX ORDER — HYDROMORPHONE HYDROCHLORIDE 2 MG/ML
1 INJECTION, SOLUTION INTRAMUSCULAR; INTRAVENOUS; SUBCUTANEOUS
Status: DISCONTINUED | OUTPATIENT
Start: 2018-04-17 | End: 2018-04-20 | Stop reason: SDUPTHER

## 2018-04-17 RX ORDER — HYDROMORPHONE HYDROCHLORIDE 2 MG/1
2 TABLET ORAL
Status: DISCONTINUED | OUTPATIENT
Start: 2018-04-17 | End: 2018-04-18

## 2018-04-17 RX ADMIN — HYDROXYCHLOROQUINE SULFATE 200 MG: 200 TABLET, FILM COATED ORAL at 09:25

## 2018-04-17 RX ADMIN — Medication 2500 MCG: at 09:24

## 2018-04-17 RX ADMIN — HYDROMORPHONE HYDROCHLORIDE 1 MG: 2 INJECTION INTRAMUSCULAR; INTRAVENOUS; SUBCUTANEOUS at 14:41

## 2018-04-17 RX ADMIN — GEMFIBROZIL 300 MG: 600 TABLET ORAL at 09:25

## 2018-04-17 RX ADMIN — Medication 10 ML: at 14:42

## 2018-04-17 RX ADMIN — HYDROMORPHONE HYDROCHLORIDE 2 MG: 2 TABLET ORAL at 18:26

## 2018-04-17 RX ADMIN — CASTOR OIL AND BALSAM, PERU: 788; 87 OINTMENT TOPICAL at 21:10

## 2018-04-17 RX ADMIN — Medication 10 ML: at 21:11

## 2018-04-17 RX ADMIN — CASTOR OIL AND BALSAM, PERU: 788; 87 OINTMENT TOPICAL at 06:31

## 2018-04-17 RX ADMIN — ARFORMOTEROL TARTRATE 15 MCG: 15 SOLUTION RESPIRATORY (INHALATION) at 19:55

## 2018-04-17 RX ADMIN — HYDROXYCHLOROQUINE SULFATE 200 MG: 200 TABLET, FILM COATED ORAL at 18:26

## 2018-04-17 RX ADMIN — APIXABAN 5 MG: 5 TABLET, FILM COATED ORAL at 18:26

## 2018-04-17 RX ADMIN — CARVEDILOL 12.5 MG: 12.5 TABLET, FILM COATED ORAL at 18:26

## 2018-04-17 RX ADMIN — PIPERACILLIN SODIUM,TAZOBACTAM SODIUM 3.38 G: 3; .375 INJECTION, POWDER, FOR SOLUTION INTRAVENOUS at 09:15

## 2018-04-17 RX ADMIN — AMLODIPINE BESYLATE 5 MG: 5 TABLET ORAL at 09:24

## 2018-04-17 RX ADMIN — BUDESONIDE 500 MCG: 0.5 INHALANT RESPIRATORY (INHALATION) at 08:58

## 2018-04-17 RX ADMIN — PANTOPRAZOLE SODIUM 40 MG: 40 TABLET, DELAYED RELEASE ORAL at 06:30

## 2018-04-17 RX ADMIN — PREDNISONE 5 MG: 5 TABLET ORAL at 09:25

## 2018-04-17 RX ADMIN — CARVEDILOL 12.5 MG: 12.5 TABLET, FILM COATED ORAL at 09:26

## 2018-04-17 RX ADMIN — ARFORMOTEROL TARTRATE 15 MCG: 15 SOLUTION RESPIRATORY (INHALATION) at 08:58

## 2018-04-17 RX ADMIN — PIPERACILLIN SODIUM,TAZOBACTAM SODIUM 3.38 G: 3; .375 INJECTION, POWDER, FOR SOLUTION INTRAVENOUS at 19:20

## 2018-04-17 RX ADMIN — HYDROMORPHONE HYDROCHLORIDE 2 MG: 2 INJECTION INTRAMUSCULAR; INTRAVENOUS; SUBCUTANEOUS at 09:26

## 2018-04-17 RX ADMIN — HYDROMORPHONE HYDROCHLORIDE 2 MG: 2 INJECTION INTRAMUSCULAR; INTRAVENOUS; SUBCUTANEOUS at 06:28

## 2018-04-17 RX ADMIN — HYDROMORPHONE HYDROCHLORIDE 2 MG: 2 TABLET ORAL at 13:21

## 2018-04-17 RX ADMIN — BUDESONIDE 500 MCG: 0.5 INHALANT RESPIRATORY (INHALATION) at 19:55

## 2018-04-17 RX ADMIN — STANDARDIZED SENNA CONCENTRATE AND DOCUSATE SODIUM 1 TABLET: 8.6; 5 TABLET, FILM COATED ORAL at 18:26

## 2018-04-17 RX ADMIN — HYDROMORPHONE HYDROCHLORIDE 2 MG: 2 TABLET ORAL at 22:25

## 2018-04-17 RX ADMIN — HYDROMORPHONE HYDROCHLORIDE 1 MG: 2 INJECTION INTRAMUSCULAR; INTRAVENOUS; SUBCUTANEOUS at 19:21

## 2018-04-17 RX ADMIN — Medication 10 ML: at 06:31

## 2018-04-17 RX ADMIN — APIXABAN 5 MG: 5 TABLET, FILM COATED ORAL at 09:24

## 2018-04-17 RX ADMIN — HYDROMORPHONE HYDROCHLORIDE 2 MG: 2 INJECTION INTRAMUSCULAR; INTRAVENOUS; SUBCUTANEOUS at 01:47

## 2018-04-17 RX ADMIN — STANDARDIZED SENNA CONCENTRATE AND DOCUSATE SODIUM 1 TABLET: 8.6; 5 TABLET, FILM COATED ORAL at 09:25

## 2018-04-17 RX ADMIN — AMITRIPTYLINE HYDROCHLORIDE 25 MG: 10 TABLET, FILM COATED ORAL at 21:09

## 2018-04-17 RX ADMIN — POLYETHYLENE GLYCOL 3350 17 G: 17 POWDER, FOR SOLUTION ORAL at 09:24

## 2018-04-17 NOTE — PROGRESS NOTES
Problem: Mobility Impaired (Adult and Pediatric)  Goal: *Acute Goals and Plan of Care (Insert Text)  Physical Therapy Goals  Revised 4/2/2018; Reviewed on 4/17/18 and remain appropriate  1. Patient will move from supine to sit and sit to supine  and roll side to side in bed with independence within 7 day(s). 2.  Patient will transfer from bed to chair and chair to bed with modified independence using the least restrictive device within 7 day(s). 3.  Patient will perform sit to stand with modified independence within 7 day(s). 4.  Patient will ambulate with modified independence for 150 feet with the least restrictive device within 7 day(s). Physical Therapy Goals  Initiated 3/16/2018  1. Patient will move from supine to sit and sit to supine  in bed with supervision/set-up within 7 day(s). 2.  Patient will transfer from bed to chair and chair to bed with supervision/set-up using the least restrictive device within 7 day(s). 3.  Patient will perform sit to stand with minimal assistance/contact guard assist within 7 day(s). 4.  Patient will ambulate with minimal assistance/contact guard assist for 50 feet with the least restrictive device within 7 day(s). physical Therapy TREATMENT: WEEKLY REASSESSMENT  Patient: Mya Benoit (29 y.o. female)  Date: 4/17/2018  Diagnosis: Peritonitis ([address]  ESRD  PERITINITIS  Peritonitis ([address]  Procedure(s) (LRB):  DIAGNOSTIC LAPAROSCOPY WITH LAPAROSCOPIC ABDOMINAL WASH OUT  (N/A) 5 Days Post-Op  Precautions: Fall (abd drain)  Chart, physical therapy assessment, plan of care and goals were reviewed. ASSESSMENT:  Patient received supine in bed and agreeable to therapy. Patient is planned to have dialysis again today to hope switch to Tu-Th-Sat schedule. Patient tolerated session fairly well. Patient completed supine to sit with supervision and additional time to complete.  Patient required min assist for sit to stand and able to complete bed to chair transfer with min assist. Patient with poor descent into chair requiring mod assist for safety and assisting hips into chair safely. Patient encouraged to perform LE therex while up in the chair and supine in bed. Patient educated on the benefits of mobility and encouraged progressive mobility next visit. Patient verbalized understanding and highly motivated. Patient will continue to benefit from PT to progress mobility, improve strength and tolerance to activity and reach highest level of independence. At this time, patient will greatly benefit from inpatient rehab upon discharge to continue therapy efforts. Patient's progression toward goals since last assessment: no goals have been met since re-evaluation; PT visits have been sparse due to dialysis sessions and medical issues     PLAN:  Goals have been updated based on progression since last assessment. Patient continues to benefit from skilled intervention to address the above impairments. Continue to follow the patient 5 times a week to address goals. Planned Interventions:  [x]              Bed Mobility Training             [x]       Neuromuscular Re-Education  [x]              Transfer Training                   []       Orthotic/Prosthetic Training  [x]              Gait Training                         []       Modalities  [x]              Therapeutic Exercises           []       Edema Management/Control  [x]              Therapeutic Activities            [x]       Patient and Family Training/Education  []              Other (comment):  Discharge Recommendations: Inpatient Rehab  Further Equipment Recommendations for Discharge: TBD     SUBJECTIVE:   Patient stated I would like to get up to the chair. [facility]    OBJECTIVE DATA SUMMARY:   Critical Behavior:  Neurologic State: Alert  Orientation Level: Oriented X4  Cognition: Follows commands  Safety/Judgement: Awareness of environment    Strength:   Strength: Generally decreased, functional Functional Mobility Training:  Bed Mobility:     Supine to Sit: Supervision              Transfers:  Sit to Stand: Minimum assistance  Stand to Sit: Minimum assistance (poor controlled descent into chair)        Bed to Chair: Minimum assistance                    Balance:  Sitting: Intact  Standing: Impaired; Without support  Standing - Static: Fair  Standing - Dynamic : Fair  Ambulation/Gait Training:  Distance (ft): 3 Feet (ft)  Assistive Device: Gait belt (HHA)  Ambulation - Level of Assistance: Minimal assistance        Gait Abnormalities: Decreased step clearance; Path deviations;Trunk sway increased        Base of Support: Widened     Speed/Vanita: Pace decreased (<100 feet/min); Shuffled  Step Length: Right shortened;Left shortened     Activity Tolerance:   Fair. VSS  Please refer to the flowsheet for vital signs taken during this treatment.   After treatment:   [x]  Patient left in no apparent distress sitting up in chair  []  Patient left in no apparent distress in bed  [x]  Call bell left within reach  [x]  Nursing notified  []  Caregiver present  []  Bed alarm activated    COMMUNICATION/COLLABORATION:   The patients plan of care was discussed with: Registered Nurse    Josiane Au PT, DPT   Time Calculation: 11 mins

## 2018-04-17 NOTE — PROGRESS NOTES
Hospitalist Progress Note            This is a 46-year-old female with multiple medical problems including SLE, end-stage renal disease (on hemodialysis, TTS), pulmonary embolism, on Eliquis (2012), hyperlipidemia, hypertension, recent Candida peritonitis, chronic bilateral pain.  She comes over here because of abdominal pain. Daily Progress Note: 4/17/2018    Assessment/Plan:   Acute peritonitis (e. coli) with sepsis  - also with recent candida peritonitis  - peritoneal fluid heavy E coli on 3/11 and 3/18  - drain exchanged and upsized by IR on 4/5  - completed IV abx on 4/6/18  - has had multiple CT scans showing interval changes, but had to get washout due to loculations and abscess  - went to OR last week for the washout. Now has bilateral drains with erosanguinous output  - pain control  - appreciate Surgery recs  - on Zosyn      HTN  -continue current meds     Thrombocytopenia   -resolved      Bilateral LE edema  -likely third-spacing  -Dopplers negative for DVT      SLE  -continue home meds  -D/C'd Imuran, allopurinol by Nephro with acute infection and thrombocytopenia      History of PE  - on Eliquis since 2012  - per documentation from prior attending: \"appreciate discussion with PMD 3/13. The patient was seen by hematology in 2013 but seems was lost to follow up. I think at that time the plan was to stop anticoagulation if her SLE is stable. Spoke to Dr Jose A Garduno, he feels that the patient can come off Eliquis. His records do not suggest any recurrence of DVT/PE.  Spoke to Dr Annabel Cox, he has not seen the patient since >1 yr. If antiphospholipid antibodies negative then the patient can come off Eliquis\"  - 10/17 V/Q high probability for PE  - per Oncology the patient should be continued to Eliquis indefinitely      Hypophosphatemia:  -replete prn     ESRD  -on HD MWF   -RUE AVG was malfunctioning, s/p OR to fix  -Nephrology following     Anemia of chronic disease  -Transfused 2 units PRBC with HD 3/15     Hepatic steatosis    Severe protein calorie malnutrition  -consult to nutrition    Constipation:  -bowel regimen      Full code  Eliquis    Discussed with patient, Nurse  Dispo: TBD       Subjective:     Pt seen and examined  Sitting in chair  Having BM   Pain controlled with meds  JENNIFER drain still with output. Review of Systems:   As above    Objective:     Visit Vitals    BP (!) 153/109    Pulse 96    Temp 98.8 °F (37.1 °C) (Oral)    Resp 16    Ht 5' 5\" (1.651 m)    Wt 74.8 kg (164 lb 14.4 oz)    SpO2 99%    Breastfeeding No    BMI 27.44 kg/m2    O2 Flow Rate (L/min): 2 l/min O2 Device: Room air    Temp (24hrs), Av.4 °F (36.9 °C), Min:97.4 °F (36.3 °C), Max:98.8 °F (37.1 °C)         04/15 1901 -  0700  In: -   Out: 180 [Drains:180]    EXAM:  General: Chronically-ill appearing, NAD    HEENT: Anicteric sclerae, MMM  Neck:   Supple, trachea midline  Lungs:  CTA bilaterally. No Wheezing  Heart:  Regular rhythm and rate. No murmur   Abdomen: Dressing c/d/i. Tender.  Drains with serosanguineous drainage  Extremities: Warm, +peripheral edema  Neurologic:  normal speech, moves all extremities       Data Review:     Recent Results (from the past 24 hour(s))   GLUCOSE, POC    Collection Time: 18  6:57 AM   Result Value Ref Range    Glucose (POC) 114 (H) 65 - 100 mg/dL    Performed by [employer], POC    Collection Time: 18 11:48 AM   Result Value Ref Range    Glucose (POC) 104 (H) 65 - 100 mg/dL    Performed by Fredrik Goodell Float        Principal Problem:    Peritonitis ([address] (3/11/2018)    Active Problems:    Generalized abdominal pain (2018)      Other constipation (2018)      Other ascites (2018)        Medications reviewed  Current Facility-Administered Medications   Medication Dose Route Frequency    HYDROmorphone (DILAUDID) tablet 2 mg  2 mg Oral Q4H PRN    HYDROmorphone (DILAUDID) injection 1 mg  1 mg IntraVENous Q3H PRN    senna-docusate (PERICOLACE) 8.6-50 mg per tablet 1 Tab  1 Tab Oral BID    apixaban (ELIQUIS) tablet 5 mg  5 mg Oral BID    piperacillin-tazobactam (ZOSYN) 3.375 g in 0.9% sodium chloride (MBP/ADV) 100 mL  3.375 g IntraVENous Q12H    sodium chloride (NS) flush 5-10 mL  5-10 mL IntraVENous Q8H    sodium chloride (NS) flush 5-10 mL  5-10 mL IntraVENous PRN    lidocaine (PF) (XYLOCAINE) 10 mg/mL (1 %) injection 0.1 mL  0.1 mL SubCUTAneous PRN    midazolam (VERSED) injection 1 mg  1 mg IntraVENous PRN    epoetin pia (EPOGEN;PROCRIT) injection 10,000 Units  10,000 Units SubCUTAneous DIALYSIS TUE, THU & SAT    lactulose (CHRONULAC) solution 10 g  10 g Oral PRN    oxyCODONE IR (ROXICODONE) tablet 10 mg  10 mg Oral Q6H PRN    polyethylene glycol (MIRALAX) packet 17 g  17 g Oral DAILY    balsam peru-castor oil (VENELEX)  mg/gram ointment   Topical Q8H    amLODIPine (NORVASC) tablet 5 mg  5 mg Oral DAILY    carvedilol (COREG) tablet 12.5 mg  12.5 mg Oral BID WITH MEALS    dextrose 5% and 0.9% NaCl infusion  15 mL/hr IntraVENous CONTINUOUS    hydrALAZINE (APRESOLINE) 20 mg/mL injection 5 mg  5 mg IntraVENous Q6H PRN    0.9% sodium chloride infusion 250 mL  250 mL IntraVENous PRN    glucose chewable tablet 16 g  4 Tab Oral PRN    dextrose (D50W) injection syrg 12.5-25 g  12.5-25 g IntraVENous PRN    glucagon (GLUCAGEN) injection 1 mg  1 mg IntraMUSCular PRN    predniSONE (DELTASONE) tablet 5 mg  5 mg Oral DAILY    pantoprazole (PROTONIX) tablet 40 mg  40 mg Oral ACB    cyanocobalamin (VITAMIN B12) tablet 2,500 mcg  2,500 mcg Oral DAILY    hydroxychloroquine (PLAQUENIL) tablet 200 mg  200 mg Oral BID    albuterol (PROVENTIL VENTOLIN) nebulizer solution 2.5 mg  2.5 mg Nebulization Q4H PRN    gemfibrozil (LOPID) tablet 300 mg  300 mg Oral DAILY    amitriptyline (ELAVIL) tablet 25 mg  25 mg Oral QHS    sodium chloride (NS) flush 5-10 mL  5-10 mL IntraVENous Q8H    sodium chloride (NS) flush 5-10 mL 5-10 mL IntraVENous PRN    ondansetron (ZOFRAN) injection 4 mg  4 mg IntraVENous Q4H PRN    arformoterol (BROVANA) neb solution 15 mcg  15 mcg Nebulization BID RT    And    budesonide (PULMICORT) 500 mcg/2 ml nebulizer suspension  500 mcg Nebulization BID RT       Eddie Molina MD

## 2018-04-17 NOTE — DIALYSIS
[facility]  [phone]    Vitals   Pre   Post   Assessment   Pre   Post     Temp  Temp: 98.8 °F (37.1 °C) (04/17/18 1431)  99.0 LOC  Patient AXOX4 Patient AXOX4   HR   Pulse (Heart Rate): 98 (04/17/18 1431) 99 Lungs   Clear Clear    B/P   BP: (!) 128/94 (04/17/18 1431) 154/111 Cardiac   HR Regular  HR regular   Resp   Resp Rate: 16 (04/17/18 1420) 18   Skin   Warm, dry, abdomen drain in place  warm, dry, and abdomen drain in place   Pain level  Pain Intensity 1: 7 (04/17/18 1321) Pt denies Edema  BLE 2+ non-pitting     BLE 2+ non-pitting   Orders:    Duration:   Start:   Procedure start time 1422 End:   procedure end time 1722 Total:   3 hours   Dialyzer:   Dialyzer/Set Up Inspection: Revaclear (04/17/18 1431)   K Bath:   Dialysate K (mEq/L): 2 (04/17/18 1431)   Ca Bath:   Dialysate CA (mEq/L): 2.5 (04/17/18 1431)   Na/Bicarb:   Dialysate NA (mEq/L): 140 (04/17/18 1431)   Target Fluid Removal:   Goal/Amount of Fluid to Remove (mL): 1500 mL (04/17/18 1431)   Access     Type & Location:   Right AV Graft-+bruit and +thrill. Skin prepped with alcohol per policy. 15G needles used. Cannulated x2. Aspirated x2, +flash, and both ports flushed with normal saline per policy. Dialysis initiated.     Labs     Obtained/Reviewed   Critical Results Called   Date when labs were drawn-  Hgb-    HGB   Date Value Ref Range Status   04/13/2018 8.0 (L) 11.5 - 16.0 g/dL Final     K-    Potassium   Date Value Ref Range Status   04/13/2018 4.7 3.5 - 5.1 mmol/L Final     Ca-   Calcium   Date Value Ref Range Status   04/13/2018 7.8 (L) 8.5 - 10.1 MG/DL Final     Bun-   BUN   Date Value Ref Range Status   04/13/2018 19 6 - 20 MG/DL Final     Creat-   Creatinine   Date Value Ref Range Status   04/13/2018 5.43 (H) 0.55 - 1.02 MG/DL Final     Comment:     INVESTIGATED PER DELTA CHECK PROTOCOL        Medications/ Blood Products Given     Name   Dose   Route and Time     No medications ordered Blood Volume Processed (BVP):    60.0 L Net Fluid   Removed:  1500 ml   Comments   Time Out Done: yes  Primary Nurse Rpt Pre: FEDERICA Price RN  Primary Nurse Rpt Post: FEDERICA Jarvis RN  Pt Education: pt educated about access care, fluid and electrolytes, medications, and diet. Care Plan: TTS Dialysis  Tx Summary: Pt tolerated 3 hours of HD. Labs orders, consents, and code status reviewed. Removed 1.5 kg via Right AV graft. All possible blood returned. Hemostasis achieved in less than 15 minutes. Report given to primary RN. Patient care techs returned patient to room 550. Admiting Diagnosis: Acute peritonits   Pt's previous clinic-  Consent signed - Informed Consent Verified: Yes (04/17/18 1431)  [facility] Consent - verified  Hepatitis Status- neg ag 3/31/2018, immune ab 3/31/2018  Machine #- Machine Number: [id number] (04/17/18 1431)  Telemetry status- none  Pre-dialysis wt. - Pre-Dialysis Weight: 74.8 kg (164 lb 14.5 oz) (04/17/18 1431)

## 2018-04-17 NOTE — PROGRESS NOTES
Patient name: Pro Pope  MRN: [id number]    Nephrology Progress note:    Assessment:  ESRD: on HD [facility] . Getting HD MWF while here. underdialyzed due to scheduling issues  E. Coli Bacteremia: source intra-abd source/ E. Coli peritonitis. IV zosyn. Repeat Bld Cx 3/11-> Neg. Recent hx of Candida peritonitis. S/p drains    HTN:    Hx of PE: on Eliquis  Lupus: Anemia 2 to ESRD/Lupus: Hgb remains below goal - on EPO  SHPT: Hypophosphatemia-> give Neutra-Phos  Protein malnutrition-on Nepro  Edema: 3rd spacing-> increase UF on HD as tolerated    Plan:  Repeat HD today again to bring her back to [facility] schedule  Ct IV ABx    Subjective   Up in bed. Feels good.  No acute c/o      Exam:  Visit Vitals    BP (!) 148/106 (BP 1 Location: Left arm, BP Patient Position: Sitting)    Pulse 100    Temp 98.6 °F (37 °C)    Resp 16    Ht 5' 5\" (1.651 m)    Wt 74.8 kg (164 lb 14.4 oz)    SpO2 97%    Breastfeeding No    BMI 27.44 kg/m2     Wt Readings from Last 3 Encounters:   04/17/18 74.8 kg (164 lb 14.4 oz)   03/05/18 68 kg (150 lb)   01/29/18 69.4 kg (153 lb)       Intake/Output Summary (Last 24 hours) at 04/17/18 1031  Last data filed at 04/17/18 0622   Gross per 24 hour   Intake                0 ml   Output              130 ml   Net             -130 ml     NAD  Tr to 1+ edema  AOx3  R AV access patent    Labs/Data:    Lab Results   Component Value Date/Time    WBC 6.3 04/13/2018 03:53 AM    Hemoglobin (POC) 7.8 (L) 01/19/2018 12:30 PM    HGB 8.0 (L) 04/13/2018 03:53 AM    Hematocrit (POC) 23 (L) 01/19/2018 12:30 PM    HCT 27.2 (L) 04/13/2018 03:53 AM    PLATELET 604 80/75/6153 03:53 AM    MCV 93.8 04/13/2018 03:53 AM       Lab Results   Component Value Date/Time    Sodium 141 04/13/2018 03:53 AM    Potassium 4.7 04/13/2018 03:53 AM    Chloride 106 04/13/2018 03:53 AM    CO2 28 04/13/2018 03:53 AM    Anion gap 7 04/13/2018 03:53 AM    Glucose 78 04/13/2018 03:53 AM    BUN 19 04/13/2018 03:53 AM    Creatinine 5.43 (H) 04/13/2018 03:53 AM    BUN/Creatinine ratio 3 (L) 04/13/2018 03:53 AM    GFR est AA 11 (L) 04/13/2018 03:53 AM    GFR est non-AA 9 (L) 04/13/2018 03:53 AM    Calcium 7.8 (L) 04/13/2018 03:53 AM        Discussed with patient     Tran St MD  [id number] [employer]

## 2018-04-17 NOTE — PROGRESS NOTES
Progress Note    Patient: Mya Benoit MRN: [id number]  SSN: [id number]    YOB: 1985  Age: 29 y.o. Sex: female      Admit Date: 3/11/2018    5 Days Post-Op    Procedure:  Procedure(s):  DIAGNOSTIC LAPAROSCOPY WITH LAPAROSCOPIC ABDOMINAL [address] OUT     Subjective:     Patient stated doing okay, still with some abdominal pain, medications helpful. No nausea, no vomiting, no chest pain, no shortness of breath. Ambulating, +flatus and bowel movement this morning. Receiving dialysis MWF    Objective:     Visit Vitals    BP (!) 148/106 (BP 1 Location: Left arm, BP Patient Position: Sitting)    Pulse 100    Temp 98.6 °F (37 °C)    Resp 16    Ht 5' 5\" (1.651 m)    Wt 164 lb 14.4 oz (74.8 kg)    SpO2 97%    Breastfeeding No    BMI 27.44 kg/m2       Temp (24hrs), Av.2 °F (36.8 °C), Min:97.4 °F (36.3 °C), Max:98.6 °F (37 °C)      Physical Exam:    LUNG: clear to auscultation bilaterally, HEART: regular rate and rhythm, S1, S2 normal,ABDOMEN: soft, non-distended, generalized tenderness Surgical sites dry and intact. Bowel sounds present. Left and right JENNIFER drains with sero-sang output, greater in left than right.  EXTREMITIES:  extremities normal, atraumatic, no cyanosis or edema        Lab Review:   BMP: No results found for: NA, K, CL, CO2, AGAP, GLU, BUN, CREA, GFRAA, GFRNA  CMP: No results found for: NA, K, CL, CO2, AGAP, GLU, BUN, CREA, GFRAA, GFRNA, CA, MG, PHOS, ALB, TBIL, TP, ALB, GLOB, AGRAT, SGOT, ALT, GPT  CBC: No results found for: WBC, HGB, HGBEXT, HCT, HCTEXT, PLT, PLTEXT, HGBEXT, HCTEXT, PLTEXT    Assessment:     Hospital Problems  Date Reviewed: 3/11/2018          Codes Class Noted POA    Generalized abdominal pain ICD-10-CM: R10.84  ICD-9-CM: 789.07  2018 Unknown        Other constipation ICD-10-CM: K59.09  ICD-9-CM: 564.09  2018 Unknown        Other ascites ICD-10-CM: R18.8  ICD-9-CM: 789.59  2018 Unknown        * (Principal)[facility] ICD-10-CM: K65.9  ICD-9-CM: 567.9  3/11/2018 Yes              Plan/Recommendations/Medical Decision Making:     Continue present treatment  Continue to encourage PO intake and ambulation. Transition to oral pain medication regime  Continue IV antibiotics. Continue to monitor output of JENNIFER drains. Signed By: Stefano Dela Cruz NP     April 17, 2018       Pt seen and examined  Agree with above.

## 2018-04-18 LAB
GLUCOSE BLD STRIP.AUTO-MCNC: 122 MG/DL (ref 65–100)
GLUCOSE BLD STRIP.AUTO-MCNC: 135 MG/DL (ref 65–100)
GLUCOSE BLD STRIP.AUTO-MCNC: 74 MG/DL (ref 65–100)
GLUCOSE BLD STRIP.AUTO-MCNC: 76 MG/DL (ref 65–100)
GLUCOSE BLD STRIP.AUTO-MCNC: 91 MG/DL (ref 65–100)
GLUCOSE BLD STRIP.AUTO-MCNC: 99 MG/DL (ref 65–100)
SERVICE CMNT-IMP: ABNORMAL
SERVICE CMNT-IMP: ABNORMAL
SERVICE CMNT-IMP: NORMAL

## 2018-04-18 PROCEDURE — 74011250637 HC RX REV CODE- 250/637: Performed by: HOSPITALIST

## 2018-04-18 PROCEDURE — 97116 GAIT TRAINING THERAPY: CPT

## 2018-04-18 PROCEDURE — 74011636637 HC RX REV CODE- 636/637: Performed by: HOSPITALIST

## 2018-04-18 PROCEDURE — 94640 AIRWAY INHALATION TREATMENT: CPT

## 2018-04-18 PROCEDURE — 74011250636 HC RX REV CODE- 250/636: Performed by: SURGERY

## 2018-04-18 PROCEDURE — 87205 SMEAR GRAM STAIN: CPT | Performed by: HOSPITALIST

## 2018-04-18 PROCEDURE — 74011250636 HC RX REV CODE- 250/636: Performed by: NURSE PRACTITIONER

## 2018-04-18 PROCEDURE — 74011250637 HC RX REV CODE- 250/637: Performed by: INTERNAL MEDICINE

## 2018-04-18 PROCEDURE — 94664 DEMO&/EVAL PT USE INHALER: CPT

## 2018-04-18 PROCEDURE — 77010033678 HC OXYGEN DAILY

## 2018-04-18 PROCEDURE — 74011250637 HC RX REV CODE- 250/637: Performed by: SURGERY

## 2018-04-18 PROCEDURE — 74011250637 HC RX REV CODE- 250/637: Performed by: NURSE PRACTITIONER

## 2018-04-18 PROCEDURE — 65270000029 HC RM PRIVATE

## 2018-04-18 PROCEDURE — 82962 GLUCOSE BLOOD TEST: CPT

## 2018-04-18 PROCEDURE — 74011000250 HC RX REV CODE- 250: Performed by: HOSPITALIST

## 2018-04-18 PROCEDURE — 74011000258 HC RX REV CODE- 258: Performed by: SURGERY

## 2018-04-18 RX ORDER — HYDROMORPHONE HYDROCHLORIDE 4 MG/1
4 TABLET ORAL
Status: DISCONTINUED | OUTPATIENT
Start: 2018-04-18 | End: 2018-04-18

## 2018-04-18 RX ORDER — OXYCODONE HYDROCHLORIDE 5 MG/1
15 TABLET ORAL
Status: DISCONTINUED | OUTPATIENT
Start: 2018-04-18 | End: 2018-04-20

## 2018-04-18 RX ADMIN — HYDROMORPHONE HYDROCHLORIDE 1 MG: 2 INJECTION INTRAMUSCULAR; INTRAVENOUS; SUBCUTANEOUS at 17:44

## 2018-04-18 RX ADMIN — Medication 10 ML: at 20:58

## 2018-04-18 RX ADMIN — AMITRIPTYLINE HYDROCHLORIDE 25 MG: 10 TABLET, FILM COATED ORAL at 20:55

## 2018-04-18 RX ADMIN — HYDROMORPHONE HYDROCHLORIDE 2 MG: 2 TABLET ORAL at 05:03

## 2018-04-18 RX ADMIN — CASTOR OIL AND BALSAM, PERU: 788; 87 OINTMENT TOPICAL at 20:56

## 2018-04-18 RX ADMIN — CASTOR OIL AND BALSAM, PERU: 788; 87 OINTMENT TOPICAL at 05:06

## 2018-04-18 RX ADMIN — CARVEDILOL 12.5 MG: 12.5 TABLET, FILM COATED ORAL at 10:59

## 2018-04-18 RX ADMIN — APIXABAN 5 MG: 5 TABLET, FILM COATED ORAL at 10:47

## 2018-04-18 RX ADMIN — HYDROXYCHLOROQUINE SULFATE 200 MG: 200 TABLET, FILM COATED ORAL at 17:53

## 2018-04-18 RX ADMIN — HYDROMORPHONE HYDROCHLORIDE 1 MG: 2 INJECTION INTRAMUSCULAR; INTRAVENOUS; SUBCUTANEOUS at 10:59

## 2018-04-18 RX ADMIN — OXYCODONE HYDROCHLORIDE 15 MG: 5 TABLET ORAL at 13:43

## 2018-04-18 RX ADMIN — HYDROMORPHONE HYDROCHLORIDE 1 MG: 2 INJECTION INTRAMUSCULAR; INTRAVENOUS; SUBCUTANEOUS at 00:57

## 2018-04-18 RX ADMIN — AMLODIPINE BESYLATE 5 MG: 5 TABLET ORAL at 10:47

## 2018-04-18 RX ADMIN — GEMFIBROZIL 300 MG: 600 TABLET ORAL at 10:47

## 2018-04-18 RX ADMIN — CASTOR OIL AND BALSAM, PERU: 788; 87 OINTMENT TOPICAL at 13:44

## 2018-04-18 RX ADMIN — STANDARDIZED SENNA CONCENTRATE AND DOCUSATE SODIUM 1 TABLET: 8.6; 5 TABLET, FILM COATED ORAL at 17:52

## 2018-04-18 RX ADMIN — ARFORMOTEROL TARTRATE 15 MCG: 15 SOLUTION RESPIRATORY (INHALATION) at 22:48

## 2018-04-18 RX ADMIN — HYDROMORPHONE HYDROCHLORIDE 1 MG: 2 INJECTION INTRAMUSCULAR; INTRAVENOUS; SUBCUTANEOUS at 14:59

## 2018-04-18 RX ADMIN — CARVEDILOL 12.5 MG: 12.5 TABLET, FILM COATED ORAL at 17:53

## 2018-04-18 RX ADMIN — PANTOPRAZOLE SODIUM 40 MG: 40 TABLET, DELAYED RELEASE ORAL at 07:12

## 2018-04-18 RX ADMIN — ARFORMOTEROL TARTRATE 15 MCG: 15 SOLUTION RESPIRATORY (INHALATION) at 09:12

## 2018-04-18 RX ADMIN — BUDESONIDE 500 MCG: 0.5 INHALANT RESPIRATORY (INHALATION) at 22:48

## 2018-04-18 RX ADMIN — HYDROMORPHONE HYDROCHLORIDE 1 MG: 2 INJECTION INTRAMUSCULAR; INTRAVENOUS; SUBCUTANEOUS at 20:56

## 2018-04-18 RX ADMIN — PIPERACILLIN SODIUM,TAZOBACTAM SODIUM 3.38 G: 3; .375 INJECTION, POWDER, FOR SOLUTION INTRAVENOUS at 20:22

## 2018-04-18 RX ADMIN — APIXABAN 5 MG: 5 TABLET, FILM COATED ORAL at 17:52

## 2018-04-18 RX ADMIN — PREDNISONE 5 MG: 5 TABLET ORAL at 10:47

## 2018-04-18 RX ADMIN — Medication 2500 MCG: at 10:47

## 2018-04-18 RX ADMIN — PIPERACILLIN SODIUM,TAZOBACTAM SODIUM 3.38 G: 3; .375 INJECTION, POWDER, FOR SOLUTION INTRAVENOUS at 10:59

## 2018-04-18 RX ADMIN — BUDESONIDE 500 MCG: 0.5 INHALANT RESPIRATORY (INHALATION) at 09:12

## 2018-04-18 RX ADMIN — OXYCODONE HYDROCHLORIDE 15 MG: 5 TABLET ORAL at 19:03

## 2018-04-18 RX ADMIN — HYDROXYCHLOROQUINE SULFATE 200 MG: 200 TABLET, FILM COATED ORAL at 10:47

## 2018-04-18 RX ADMIN — HYDROMORPHONE HYDROCHLORIDE 1 MG: 2 INJECTION INTRAMUSCULAR; INTRAVENOUS; SUBCUTANEOUS at 07:12

## 2018-04-18 NOTE — PROGRESS NOTES
General Surgery Daily Progress Note    Admit Date: 3/11/2018  Post-Operative Day: 6 Days Post-Op from Procedure(s):  DIAGNOSTIC LAPAROSCOPY WITH LAPAROSCOPIC ABDOMINAL [address] OUT      Subjective:     Last 24 hrs: Pt is w/o complaints. Eating lunch. Objective:     Blood pressure (!) 158/108, pulse (!) 102, temperature 98.3 °F (36.8 °C), resp. rate 18, height 5' 5\" (1.651 m), weight 157 lb 6.4 oz (71.4 kg), SpO2 96 %, not currently breastfeeding. Temp (24hrs), Av.8 °F (37.1 °C), Min:98.3 °F (36.8 °C), Max:99 °F (37.2 °C)      _____________________  Physical Exam:     Alert and Oriented, x3, in no acute distress. Cardiovascular: tachy, no peripheral edema  Abdomen: soft, JPs intact; R w/ little output      Assessment:   Principal Problem:    Peritonitis ([address] (3/11/2018)    Active Problems:    Generalized abdominal pain (2018)      Other constipation (2018)      Other ascites (2018)            Plan:     May d/c R drain  Keep L drain  Cont abx - zosyn  Dialysis as scheduled    Data Review:    No results for input(s): WBC, HGB, HCT, PLT, HGBEXT, HCTEXT, PLTEXT in the last 72 hours. Recent Labs      18   1430   NA  144   K  4.7   CL  110*   CO2  26   GLU  101*   BUN  19   CREA  5.61*   CA  7.4*   MG  1.7   ALB  1.1*   SGOT  13*   ALT  <6*     No results for input(s): AML, LPSE in the last 72 hours.         ______________________  Medications:    Current Facility-Administered Medications   Medication Dose Route Frequency    oxyCODONE IR (ROXICODONE) tablet 15 mg  15 mg Oral Q4H PRN    HYDROmorphone (DILAUDID) injection 1 mg  1 mg IntraVENous Q3H PRN    senna-docusate (PERICOLACE) 8.6-50 mg per tablet 1 Tab  1 Tab Oral BID    apixaban (ELIQUIS) tablet 5 mg  5 mg Oral BID    piperacillin-tazobactam (ZOSYN) 3.375 g in 0.9% sodium chloride (MBP/ADV) 100 mL  3.375 g IntraVENous Q12H    sodium chloride (NS) flush 5-10 mL  5-10 mL IntraVENous Q8H    sodium chloride (NS) flush 5-10 mL  5-10 mL IntraVENous PRN    lidocaine (PF) (XYLOCAINE) 10 mg/mL (1 %) injection 0.1 mL  0.1 mL SubCUTAneous PRN    midazolam (VERSED) injection 1 mg  1 mg IntraVENous PRN    epoetin pia (EPOGEN;PROCRIT) injection 10,000 Units  10,000 Units SubCUTAneous DIALYSIS TUE, THU & SAT    lactulose (CHRONULAC) solution 10 g  10 g Oral PRN    polyethylene glycol (MIRALAX) packet 17 g  17 g Oral DAILY    balsam peru-castor oil (VENELEX)  mg/gram ointment   Topical Q8H    amLODIPine (NORVASC) tablet 5 mg  5 mg Oral DAILY    carvedilol (COREG) tablet 12.5 mg  12.5 mg Oral BID WITH MEALS    dextrose 5% and 0.9% NaCl infusion  15 mL/hr IntraVENous CONTINUOUS    hydrALAZINE (APRESOLINE) 20 mg/mL injection 5 mg  5 mg IntraVENous Q6H PRN    0.9% sodium chloride infusion 250 mL  250 mL IntraVENous PRN    glucose chewable tablet 16 g  4 Tab Oral PRN    dextrose (D50W) injection syrg 12.5-25 g  12.5-25 g IntraVENous PRN    glucagon (GLUCAGEN) injection 1 mg  1 mg IntraMUSCular PRN    predniSONE (DELTASONE) tablet 5 mg  5 mg Oral DAILY    pantoprazole (PROTONIX) tablet 40 mg  40 mg Oral ACB    cyanocobalamin (VITAMIN B12) tablet 2,500 mcg  2,500 mcg Oral DAILY    hydroxychloroquine (PLAQUENIL) tablet 200 mg  200 mg Oral BID    albuterol (PROVENTIL VENTOLIN) nebulizer solution 2.5 mg  2.5 mg Nebulization Q4H PRN    gemfibrozil (LOPID) tablet 300 mg  300 mg Oral DAILY    amitriptyline (ELAVIL) tablet 25 mg  25 mg Oral QHS    sodium chloride (NS) flush 5-10 mL  5-10 mL IntraVENous Q8H    sodium chloride (NS) flush 5-10 mL  5-10 mL IntraVENous PRN    ondansetron (ZOFRAN) injection 4 mg  4 mg IntraVENous Q4H PRN    arformoterol (BROVANA) neb solution 15 mcg  15 mcg Nebulization BID RT    And    budesonide (PULMICORT) 500 mcg/2 ml nebulizer suspension  500 mcg Nebulization BID RT       Garry Beaver NP  4/18/2018

## 2018-04-18 NOTE — PROGRESS NOTES
Cm contacted [facility] and faxed progress notes to them to update on patient. Will follow.    [id number] [address], [place]

## 2018-04-18 NOTE — PROGRESS NOTES
Hospitalist Progress Note            This is a 27-year-old female with multiple medical problems including SLE, end-stage renal disease (on hemodialysis, TTS), pulmonary embolism, on Eliquis (2012), hyperlipidemia, hypertension, recent Candida peritonitis, chronic bilateral pain.  She comes over here because of abdominal pain. Daily Progress Note: 4/18/2018    Assessment/Plan:   Acute peritonitis (e. coli) with sepsis  - also with recent candida peritonitis  - peritoneal fluid heavy E coli on 3/11 and 3/18  - drain exchanged and upsized by IR on 4/5  - completed IV abx on 4/6/18  - has had multiple CT scans showing interval changes, but had to get washout due to loculations and abscess  - went to OR last week for the washout. Now has bilateral drains with erosanguinous output  - pain control  - appreciate Surgery recs  - on Zosyn since 4/12   - d/w ID, will send Fluid culture from the drain, if negative, plan to stop abx        HTN  -continue current meds     Thrombocytopenia   -resolved      Bilateral LE edema  -likely third-spacing  -Dopplers negative for DVT      SLE  -continue home meds  -D/C'd Imuran, allopurinol by Nephro with acute infection and thrombocytopenia      History of PE  - on Eliquis since 2012  - per documentation from prior attending: \"appreciate discussion with PMD 3/13. The patient was seen by hematology in 2013 but seems was lost to follow up. I think at that time the plan was to stop anticoagulation if her SLE is stable. Spoke to Dr Guero Royal, he feels that the patient can come off Eliquis. His records do not suggest any recurrence of DVT/PE.  Spoke to Dr Dionne Nair, he has not seen the patient since >1 yr. If antiphospholipid antibodies negative then the patient can come off Eliquis\"  - 10/17 V/Q high probability for PE  - per Oncology the patient should be continued to Eliquis indefinitely      Hypophosphatemia:  -replete prn     ESRD  -on HD CHON DOSS was malfunctioning, s/p OR to fix  -Nephrology following     Anemia of chronic disease  -Transfused 2 units PRBC with HD 3/15     Hepatic steatosis    Severe protein calorie malnutrition  -consult to nutrition    Constipation:  -bowel regimen      Full code  Eliquis    Discussed with patient, Nurse  Dispo: TBD       Subjective:     Pt seen and examined  Having BM   Pain controlled with meds  JENNIFER drain still with output. Review of Systems:   As above    Objective:     Visit Vitals    BP (!) 158/108 (BP 1 Location: Left arm, BP Patient Position: At rest)    Pulse (!) 102    Temp 98.3 °F (36.8 °C)    Resp 18    Ht 5' 5\" (1.651 m)    Wt 71.4 kg (157 lb 6.4 oz)    SpO2 96%    Breastfeeding No    BMI 26.19 kg/m2    O2 Flow Rate (L/min): 2 l/min O2 Device: Room air    Temp (24hrs), Av.8 °F (37.1 °C), Min:98.3 °F (36.8 °C), Max:99 °F (37.2 °C)          1901 -  0700  In: -   Out: 8202 [Drains:225]    EXAM:  General: Chronically-ill appearing, NAD    HEENT: Anicteric sclerae, MMM  Neck:   Supple, trachea midline  Lungs:  CTA bilaterally. No Wheezing  Heart:  Regular rhythm and rate. No murmur   Abdomen: Dressing c/d/i. Tender.  Drains with serosanguineous drainage  Extremities: Warm, +peripheral edema  Neurologic:  normal speech, moves all extremities       Data Review:     Recent Results (from the past 24 hour(s))   GLUCOSE, POC    Collection Time: 18  4:42 PM   Result Value Ref Range    Glucose (POC) 93 65 - 100 mg/dL    Performed by Van Zuñiga    GLUCOSE, POC    Collection Time: 18  9:39 PM   Result Value Ref Range    Glucose (POC) 270 (H) 65 - 100 mg/dL    Performed by Adele Washington, POC    Collection Time: 18  6:28 AM   Result Value Ref Range    Glucose (POC) 91 65 - 100 mg/dL    Performed by Micheal Pérez, POC    Collection Time: 18 11:40 AM   Result Value Ref Range    Glucose (POC) 74 65 - 100 mg/dL    Performed by [address] [place] [address], [facility] Collection Time: 04/18/18 12:03 PM   Result Value Ref Range    Glucose (POC) 76 65 - 100 mg/dL    Performed by Genesis Carmona    GLUCOSE, POC    Collection Time: 04/18/18 12:29 PM   Result Value Ref Range    Glucose (POC) 122 (H) 65 - 100 mg/dL    Performed by Genesis Carmona        Principal Problem:    Peritonitis ([address] (3/11/2018)    Active Problems:    Generalized abdominal pain (4/4/2018)      Other constipation (4/4/2018)      Other ascites (4/4/2018)        Medications reviewed  Current Facility-Administered Medications   Medication Dose Route Frequency    oxyCODONE IR (ROXICODONE) tablet 15 mg  15 mg Oral Q4H PRN    HYDROmorphone (DILAUDID) injection 1 mg  1 mg IntraVENous Q3H PRN    senna-docusate (PERICOLACE) 8.6-50 mg per tablet 1 Tab  1 Tab Oral BID    apixaban (ELIQUIS) tablet 5 mg  5 mg Oral BID    piperacillin-tazobactam (ZOSYN) 3.375 g in 0.9% sodium chloride (MBP/ADV) 100 mL  3.375 g IntraVENous Q12H    sodium chloride (NS) flush 5-10 mL  5-10 mL IntraVENous Q8H    sodium chloride (NS) flush 5-10 mL  5-10 mL IntraVENous PRN    lidocaine (PF) (XYLOCAINE) 10 mg/mL (1 %) injection 0.1 mL  0.1 mL SubCUTAneous PRN    midazolam (VERSED) injection 1 mg  1 mg IntraVENous PRN    epoetin pia (EPOGEN;PROCRIT) injection 10,000 Units  10,000 Units SubCUTAneous DIALYSIS TUE, THU & SAT    lactulose (CHRONULAC) solution 10 g  10 g Oral PRN    polyethylene glycol (MIRALAX) packet 17 g  17 g Oral DAILY    balsam peru-castor oil (VENELEX)  mg/gram ointment   Topical Q8H    amLODIPine (NORVASC) tablet 5 mg  5 mg Oral DAILY    carvedilol (COREG) tablet 12.5 mg  12.5 mg Oral BID WITH MEALS    dextrose 5% and 0.9% NaCl infusion  15 mL/hr IntraVENous CONTINUOUS    hydrALAZINE (APRESOLINE) 20 mg/mL injection 5 mg  5 mg IntraVENous Q6H PRN    0.9% sodium chloride infusion 250 mL  250 mL IntraVENous PRN    glucose chewable tablet 16 g  4 Tab Oral PRN    dextrose (D50W) injection syrg 12.5-25 g 12.5-25 g IntraVENous PRN    glucagon (GLUCAGEN) injection 1 mg  1 mg IntraMUSCular PRN    predniSONE (DELTASONE) tablet 5 mg  5 mg Oral DAILY    pantoprazole (PROTONIX) tablet 40 mg  40 mg Oral ACB    cyanocobalamin (VITAMIN B12) tablet 2,500 mcg  2,500 mcg Oral DAILY    hydroxychloroquine (PLAQUENIL) tablet 200 mg  200 mg Oral BID    albuterol (PROVENTIL VENTOLIN) nebulizer solution 2.5 mg  2.5 mg Nebulization Q4H PRN    gemfibrozil (LOPID) tablet 300 mg  300 mg Oral DAILY    amitriptyline (ELAVIL) tablet 25 mg  25 mg Oral QHS    sodium chloride (NS) flush 5-10 mL  5-10 mL IntraVENous Q8H    sodium chloride (NS) flush 5-10 mL  5-10 mL IntraVENous PRN    ondansetron (ZOFRAN) injection 4 mg  4 mg IntraVENous Q4H PRN    arformoterol (BROVANA) neb solution 15 mcg  15 mcg Nebulization BID RT    And    budesonide (PULMICORT) 500 mcg/2 ml nebulizer suspension  500 mcg Nebulization BID RT       Iban Torres MD

## 2018-04-18 NOTE — PROGRESS NOTES
Problem: Mobility Impaired (Adult and Pediatric)  Goal: *Acute Goals and Plan of Care (Insert Text)  Physical Therapy Goals  Revised 4/2/2018; Reviewed on 4/17/18 and remain appropriate  1. Patient will move from supine to sit and sit to supine  and roll side to side in bed with independence within 7 day(s). 2.  Patient will transfer from bed to chair and chair to bed with modified independence using the least restrictive device within 7 day(s). 3.  Patient will perform sit to stand with modified independence within 7 day(s). 4.  Patient will ambulate with modified independence for 150 feet with the least restrictive device within 7 day(s). Physical Therapy Goals  Initiated 3/16/2018  1. Patient will move from supine to sit and sit to supine  in bed with supervision/set-up within 7 day(s). 2.  Patient will transfer from bed to chair and chair to bed with supervision/set-up using the least restrictive device within 7 day(s). 3.  Patient will perform sit to stand with minimal assistance/contact guard assist within 7 day(s). 4.  Patient will ambulate with minimal assistance/contact guard assist for 50 feet with the least restrictive device within 7 day(s). physical Therapy TREATMENT  Patient: Kentrell Villarreal (21 y.o. female)  Date: 4/18/2018  Diagnosis: Peritonitis ([address]  ESRD  PERITINITIS  Peritonitis ([address]  Procedure(s) (LRB):  DIAGNOSTIC LAPAROSCOPY WITH LAPAROSCOPIC ABDOMINAL WASH OUT  (N/A) 6 Days Post-Op  Precautions: Fall (abd drain)  Chart, physical therapy assessment, plan of care and goals were reviewed. ASSESSMENT:  Patient with improved mobility today and agreeable to ambulate. Pt required min assist to stand from elevated bed height, then was able to ambulate 150 ft using RW with SBA for safety. Gait steady, but slow. Verbal cues for upright posture throughout. Pt left in chair at end of session. Anticipate pt will be safe for discharge home with family assist and HHPT.  Pt prefers home vs rehab, says she will have 24 hour assist at home and her parents can drive her to dialysis appts. Will continue to follow to progress mobility while pt remains in hospital.   Progression toward goals:  [x]    Improving appropriately and progressing toward goals  []    Improving slowly and progressing toward goals  []    Not making progress toward goals and plan of care will be adjusted     PLAN:  Patient continues to benefit from skilled intervention to address the above impairments. Continue treatment per established plan of care. Discharge Recommendations:  Home Health  Further Equipment Recommendations for Discharge:  None, has RW     SUBJECTIVE:   Patient stated I wasn't feeling good yesterday.     OBJECTIVE DATA SUMMARY:   Critical Behavior:  Neurologic State: Alert  Orientation Level: Oriented X4  Cognition: Follows commands  Safety/Judgement: Awareness of environment  Functional Mobility Training:  Bed Mobility:     Supine to Sit: Supervision              Transfers:  Sit to Stand: Minimum assistance;Assist x1 (from elevated bed height)  Stand to Sit: Contact guard assistance                             Balance:  Sitting: Intact  Standing: Intact; With support  Ambulation/Gait Training:  Distance (ft): 150 Feet (ft)  Assistive Device: Gait belt;Walker, rolling  Ambulation - Level of Assistance: Stand-by assistance        Gait Abnormalities: Decreased step clearance        Base of Support: Widened     Speed/Vanita: Pace decreased (<100 feet/min)  Step Length: Left shortened;Right shortened          Pain:  Pain Scale 1: Numeric (0 - 10)  Pain Intensity 1: 7  Pain Location 1: Abdomen  Pain Orientation 1: Anterior  Pain Description 1: Sore  Pain Intervention(s) 1: Medication (see MAR)  Activity Tolerance:   VSS  Please refer to the flowsheet for vital signs taken during this treatment.   After treatment:   [x]    Patient left in no apparent distress sitting up in chair  []    Patient left in no apparent distress in bed  [x]    Call bell left within reach  [x]    Nursing notified  []    Caregiver present  []    Bed alarm activated    COMMUNICATION/COLLABORATION:   The patients plan of care was discussed with: Registered Nurse    Robin Vanessa, PT   Time Calculation: 17 mins

## 2018-04-18 NOTE — PROGRESS NOTES
Patient name: Dino Esparza  MRN: [id number]    Nephrology Progress note:    Assessment:  ESRD: on HD [facility] . Switched back to TTS schedule  E. Coli Bacteremia: source intra-abd source/ E. Coli peritonitis. IV zosyn. Repeat Bld Cx 3/11-> Neg. Recent hx of Candida peritonitis. S/p drains    HTN:  Hx of PE: on Eliquis  Lupus: Anemia 2 to ESRD/Lupus: Hgb remains below goal - on EPO  SHPT: Hypophosphatemia-> give Neutra-Phos  Protein malnutrition-on Nepro  Edema: 3rd spacing-> increase UF on HD as tolerated    Plan:  HD tomm  Drains still in place  Ct IV ABx  Encourage increase prot intake. On Nepro    Subjective   oob in chair. Feels good.  No acute c/o      Exam:  Visit Vitals    BP (!) 158/108 (BP 1 Location: Left arm, BP Patient Position: At rest)    Pulse (!) 102    Temp 98.3 °F (36.8 °C)    Resp 18    Ht 5' 5\" (1.651 m)    Wt 71.4 kg (157 lb 6.4 oz)    SpO2 96%    Breastfeeding No    BMI 26.19 kg/m2     Wt Readings from Last 3 Encounters:   04/18/18 71.4 kg (157 lb 6.4 oz)   03/05/18 68 kg (150 lb)   01/29/18 69.4 kg (153 lb)       Intake/Output Summary (Last 24 hours) at 04/18/18 1159  Last data filed at 04/17/18 2232   Gross per 24 hour   Intake                0 ml   Output             1595 ml   Net            -1595 ml     NAD  Bitemporal wasting   1+ edema  +abd drains in place  AOx3  R AV access patent    Labs/Data:    Lab Results   Component Value Date/Time    WBC 6.3 04/13/2018 03:53 AM    Hemoglobin (POC) 7.8 (L) 01/19/2018 12:30 PM    HGB 8.0 (L) 04/13/2018 03:53 AM    Hematocrit (POC) 23 (L) 01/19/2018 12:30 PM    HCT 27.2 (L) 04/13/2018 03:53 AM    PLATELET 822 70/54/0433 03:53 AM    MCV 93.8 04/13/2018 03:53 AM       Lab Results   Component Value Date/Time    Sodium 144 04/16/2018 02:30 PM    Potassium 4.7 04/16/2018 02:30 PM    Chloride 110 (H) 04/16/2018 02:30 PM    CO2 26 04/16/2018 02:30 PM    Anion gap 8 04/16/2018 02:30 PM    Glucose 101 (H) 04/16/2018 02:30 PM    BUN 19 04/16/2018 02:30 PM    Creatinine 5.61 (H) 04/16/2018 02:30 PM    BUN/Creatinine ratio 3 (L) 04/16/2018 02:30 PM    GFR est AA 11 (L) 04/16/2018 02:30 PM    GFR est non-AA 9 (L) 04/16/2018 02:30 PM    Calcium 7.4 (L) 04/16/2018 02:30 PM        Discussed with patient     Jaspreet Mckeon MD  [phone]

## 2018-04-19 ENCOUNTER — HOME HEALTH ADMISSION (OUTPATIENT)
Dept: HOME HEALTH SERVICES | Facility: HOME HEALTH | Age: 33
End: 2018-04-19
Payer: MEDICARE

## 2018-04-19 LAB
GLUCOSE BLD STRIP.AUTO-MCNC: 119 MG/DL (ref 65–100)
GLUCOSE BLD STRIP.AUTO-MCNC: 86 MG/DL (ref 65–100)
GLUCOSE BLD STRIP.AUTO-MCNC: 89 MG/DL (ref 65–100)
GLUCOSE BLD STRIP.AUTO-MCNC: 92 MG/DL (ref 65–100)
SERVICE CMNT-IMP: ABNORMAL
SERVICE CMNT-IMP: NORMAL

## 2018-04-19 PROCEDURE — 74011250636 HC RX REV CODE- 250/636: Performed by: INTERNAL MEDICINE

## 2018-04-19 PROCEDURE — 74011250636 HC RX REV CODE- 250/636: Performed by: SURGERY

## 2018-04-19 PROCEDURE — 77010033678 HC OXYGEN DAILY

## 2018-04-19 PROCEDURE — 74011250637 HC RX REV CODE- 250/637: Performed by: INTERNAL MEDICINE

## 2018-04-19 PROCEDURE — 94664 DEMO&/EVAL PT USE INHALER: CPT

## 2018-04-19 PROCEDURE — 74011250636 HC RX REV CODE- 250/636: Performed by: NURSE PRACTITIONER

## 2018-04-19 PROCEDURE — 97116 GAIT TRAINING THERAPY: CPT

## 2018-04-19 PROCEDURE — 65270000029 HC RM PRIVATE

## 2018-04-19 PROCEDURE — 94640 AIRWAY INHALATION TREATMENT: CPT

## 2018-04-19 PROCEDURE — 74011250637 HC RX REV CODE- 250/637: Performed by: HOSPITALIST

## 2018-04-19 PROCEDURE — 82962 GLUCOSE BLOOD TEST: CPT

## 2018-04-19 PROCEDURE — 74011000258 HC RX REV CODE- 258: Performed by: SURGERY

## 2018-04-19 PROCEDURE — 74011636637 HC RX REV CODE- 636/637: Performed by: HOSPITALIST

## 2018-04-19 PROCEDURE — 90935 HEMODIALYSIS ONE EVALUATION: CPT

## 2018-04-19 PROCEDURE — 74011250637 HC RX REV CODE- 250/637: Performed by: SURGERY

## 2018-04-19 PROCEDURE — 74011000250 HC RX REV CODE- 250: Performed by: HOSPITALIST

## 2018-04-19 RX ADMIN — PREDNISONE 5 MG: 5 TABLET ORAL at 09:15

## 2018-04-19 RX ADMIN — HYDROMORPHONE HYDROCHLORIDE 1 MG: 2 INJECTION INTRAMUSCULAR; INTRAVENOUS; SUBCUTANEOUS at 14:47

## 2018-04-19 RX ADMIN — BUDESONIDE 500 MCG: 0.5 INHALANT RESPIRATORY (INHALATION) at 22:10

## 2018-04-19 RX ADMIN — HYDROMORPHONE HYDROCHLORIDE 1 MG: 2 INJECTION INTRAMUSCULAR; INTRAVENOUS; SUBCUTANEOUS at 09:14

## 2018-04-19 RX ADMIN — PIPERACILLIN SODIUM,TAZOBACTAM SODIUM 3.38 G: 3; .375 INJECTION, POWDER, FOR SOLUTION INTRAVENOUS at 20:59

## 2018-04-19 RX ADMIN — HYDROMORPHONE HYDROCHLORIDE 1 MG: 2 INJECTION INTRAMUSCULAR; INTRAVENOUS; SUBCUTANEOUS at 22:26

## 2018-04-19 RX ADMIN — HYDROMORPHONE HYDROCHLORIDE 1 MG: 2 INJECTION INTRAMUSCULAR; INTRAVENOUS; SUBCUTANEOUS at 00:16

## 2018-04-19 RX ADMIN — Medication 2500 MCG: at 09:15

## 2018-04-19 RX ADMIN — AMLODIPINE BESYLATE 5 MG: 5 TABLET ORAL at 18:42

## 2018-04-19 RX ADMIN — ARFORMOTEROL TARTRATE 15 MCG: 15 SOLUTION RESPIRATORY (INHALATION) at 22:10

## 2018-04-19 RX ADMIN — APIXABAN 5 MG: 5 TABLET, FILM COATED ORAL at 18:43

## 2018-04-19 RX ADMIN — CARVEDILOL 12.5 MG: 12.5 TABLET, FILM COATED ORAL at 18:43

## 2018-04-19 RX ADMIN — BUDESONIDE 500 MCG: 0.5 INHALANT RESPIRATORY (INHALATION) at 09:21

## 2018-04-19 RX ADMIN — PIPERACILLIN SODIUM,TAZOBACTAM SODIUM 3.38 G: 3; .375 INJECTION, POWDER, FOR SOLUTION INTRAVENOUS at 09:14

## 2018-04-19 RX ADMIN — HYDROMORPHONE HYDROCHLORIDE 1 MG: 2 INJECTION INTRAMUSCULAR; INTRAVENOUS; SUBCUTANEOUS at 18:59

## 2018-04-19 RX ADMIN — AMITRIPTYLINE HYDROCHLORIDE 25 MG: 10 TABLET, FILM COATED ORAL at 21:06

## 2018-04-19 RX ADMIN — EPOETIN ALFA 10000 UNITS: 10000 SOLUTION INTRAVENOUS; SUBCUTANEOUS at 18:55

## 2018-04-19 RX ADMIN — HYDROMORPHONE HYDROCHLORIDE 1 MG: 2 INJECTION INTRAMUSCULAR; INTRAVENOUS; SUBCUTANEOUS at 12:05

## 2018-04-19 RX ADMIN — Medication 10 ML: at 21:01

## 2018-04-19 RX ADMIN — CARVEDILOL 12.5 MG: 12.5 TABLET, FILM COATED ORAL at 09:15

## 2018-04-19 RX ADMIN — ARFORMOTEROL TARTRATE 15 MCG: 15 SOLUTION RESPIRATORY (INHALATION) at 09:21

## 2018-04-19 RX ADMIN — HYDROMORPHONE HYDROCHLORIDE 1 MG: 2 INJECTION INTRAMUSCULAR; INTRAVENOUS; SUBCUTANEOUS at 04:36

## 2018-04-19 RX ADMIN — GEMFIBROZIL 300 MG: 600 TABLET ORAL at 09:14

## 2018-04-19 RX ADMIN — HYDROXYCHLOROQUINE SULFATE 200 MG: 200 TABLET, FILM COATED ORAL at 18:43

## 2018-04-19 NOTE — PROGRESS NOTES
Cm met with patient at the bedside to continue discharge planning; informed her that if all goes well, the MD feels she would be ready for d/c in the next 24-48 hours. Cm discussed home health services with patient and she is agreeable with no preference of agency. Referral sent to [facility] and they can accept.   [employer] , [place]

## 2018-04-19 NOTE — WOUND CARE
Wound Care Note:     Follow-up visit for sacrum. Chart shows:  Admitted for peritonitis with a history of anemia secondary to lupus, asthma, carditis, chronic kidney disease- ESRD, chronic pain, degenerative disc disease, GERD, heart failure, hemodialysis patient, hypercholesterolemia, HTN, intractable nausea and vomiting, long term use of anticoagulants, lupus, malignant hypertension with chronic kidney disease stage V, and thromboembolus    WBC=  6.3 on 4/13/18  Admitted from home    Assessment:   Patient is A&O x 4, communicative, continent with no assistance needed in repositioning. Bed: [facility]  Diet: Renal regular with nutritional supplements  Patient reports pain 4/10 and has been medicated for pain by RN. Bilateral heels, buttocks, and sacral skin intact and without erythema. 1. Sacral skin intact. Venelex applied. Patient repositioned supine    Heels offloaded on pillow. Recommendations:    Continue using Venelex     Sacrum- Every 8 hours apply Venelex ointment.      Moisturize dry skin with Aloe Golden. Skin Care & Pressure Prevention:  Minimize layers of linen/pads under patient to optimize support surface. Turn/reposition approximately every 2 hours and offload heels.   Promote continence     Discussed above plan with patient & Taco Cervantes RN    Transition of Care: Plan to follow as needed while admitted to hospital.    JOSH Shea, RN, [facility], [facility].  office [phone]  pager [phone] or call  to page

## 2018-04-19 NOTE — DIALYSIS
[facility]  [phone]    Vitals   Pre   Post   Assessment   Pre   Post     Temp  99.0  98.6 LOC  A&O x3 A&O x3   HR   108 98 Lungs   clear  clear   B/P  140/102   165/120, primary RN aware, will medicate Cardiac   regular  regular   Resp   18 18 Skin   Dry, warm  dry, warm   Pain level  9/10, abdomen, medicated by primary RN 6/10 Edema  pedal     Pedal, decreased   Orders:    Duration:   Start:   1500 End:   1800 Total:   3 hrs   Dialyzer:   Dialyzer/Set Up Inspection: Mark Vance ([id number]) (04/19/18 1455)   K Bath:   Dialysate K (mEq/L): 2 (04/19/18 1455)   Ca Bath:   Dialysate CA (mEq/L): 2.5 (04/19/18 1455)   Na/Bicarb:   Dialysate NA (mEq/L): 140 (04/19/18 1455)   Target Fluid Removal:   Goal/Amount of Fluid to Remove (mL): 1500 mL (04/19/18 1455)   Access     Type & Location:   PETER AVG: +B&T, site cleaned with alcohol, cannulated with 15 G 1 \"needles x2, +flashes/aspir/NS flushes   Labs     Obtained/Reviewed   Critical Results Called   Date when labs were drawn-  Hgb-    HGB   Date Value Ref Range Status   04/13/2018 8.0 (L) 11.5 - 16.0 g/dL Final     K-    Potassium   Date Value Ref Range Status   04/16/2018 4.7 3.5 - 5.1 mmol/L Final     Ca-   Calcium   Date Value Ref Range Status   04/16/2018 7.4 (L) 8.5 - 10.1 MG/DL Final     Bun-   BUN   Date Value Ref Range Status   04/16/2018 19 6 - 20 MG/DL Final     Creat-   Creatinine   Date Value Ref Range Status   04/16/2018 5.61 (H) 0.55 - 1.02 MG/DL Final        Medications/ Blood Products Given     Name   Dose   Route and Time           none          Blood Volume Processed (BVP):    77 L Net Fluid   Removed:  1500 cc   Comments   Time Out Done: 7700  Primary Nurse Rpt Pre: Rashid Snow RN  Primary Nurse Rpt Post: Jalen Sark, RN  Pt Education: access care, procedure  Care Plan: continue HD tx as per MD order  Tx Summary: tolerated tx well, at the end remaining blood in circuit returned with 300 cc NS, cannulas removed x2, pressure held until hemostasis obtained, about 8 min on each site, +B&T still noted, dressing applied. Admiting Diagnosis:  Pt's previous clinic-  Consent signed - Informed Consent Verified: Yes (04/19/18 1455)  [facility] Consent - yes  Hepatitis Status-  Hep B Ag neg 03/31/18  Machine #- Machine Number: [id number] (04/19/18 1455)  Telemetry status-  Pre-dialysis wt. - Pre-Dialysis Weight: 75 kg (165 lb 5.5 oz) (04/19/18 1455)   Post-dialysis wt - 73.5 kg

## 2018-04-19 NOTE — PROGRESS NOTES
Hospitalist Progress Note            This is a 42-year-old female with multiple medical problems including SLE, end-stage renal disease (on hemodialysis, TTS), pulmonary embolism, on Eliquis (2012), hyperlipidemia, hypertension, recent Candida peritonitis, chronic bilateral pain.  She comes over here because of abdominal pain. Daily Progress Note: 4/19/2018    Assessment/Plan:   Acute peritonitis (e. coli) with sepsis  - also with recent candida peritonitis  - peritoneal fluid heavy E coli on 3/11 and 3/18  - drain exchanged and upsized by IR on 4/5  - completed IV abx on 4/6/18  - has had multiple CT scans showing interval changes, but had to get washout due to loculations and abscess  - went to OR last week for the washout. Now has bilateral drains with erosanguinous output  - pain control  - appreciate Surgery recs  - on Zosyn since 4/12   - d/w ID, will send Fluid culture from the drain, if negative, plan to stop abx        HTN  -continue current meds     Thrombocytopenia   -resolved      Bilateral LE edema  -likely third-spacing  -Dopplers negative for DVT      SLE  -continue home meds  -D/C'd Imuran, allopurinol by Nephro with acute infection and thrombocytopenia      History of PE  - on Eliquis since 2012  - per documentation from prior attending: \"appreciate discussion with PMD 3/13. The patient was seen by hematology in 2013 but seems was lost to follow up. I think at that time the plan was to stop anticoagulation if her SLE is stable. Spoke to Dr Irvin Galvan, he feels that the patient can come off Eliquis. His records do not suggest any recurrence of DVT/PE.  Spoke to Dr Mimi Hall, he has not seen the patient since >1 yr. If antiphospholipid antibodies negative then the patient can come off Eliquis\"  - 10/17 V/Q high probability for PE  - per Oncology the patient should be continued to Eliquis indefinitely      Hypophosphatemia:  -replete prn     ESRD  -on HD CHON DOSS was malfunctioning, s/p OR to fix  -Nephrology following     Anemia of chronic disease  -Transfused 2 units PRBC with HD 3/15     Hepatic steatosis    Severe protein calorie malnutrition  -consult to nutrition    Constipation:  -bowel regimen      Full code  Eliquis    Discussed with patient, Nurse  Dispo: TBD       Subjective:     Pt seen and examined  Pain better controlled  Not much output in Rt JENNIFER drain  Left still draining     Review of Systems:   As above    Objective:     Visit Vitals    BP (!) 160/115 (BP 1 Location: Left arm, BP Patient Position: At rest)    Pulse (!) 103    Temp 98.2 °F (36.8 °C)    Resp 18    Ht 5' 5\" (1.651 m)    Wt 71.7 kg (158 lb)    SpO2 96%    Breastfeeding No    BMI 26.29 kg/m2    O2 Flow Rate (L/min): 2 l/min O2 Device: Room air    Temp (24hrs), Av.4 °F (36.9 °C), Min:98.2 °F (36.8 °C), Max:98.6 °F (37 °C)          1901 -  0700  In: -   Out: 255 [Drains:255]    EXAM:  General: Chronically-ill appearing, NAD    HEENT: Anicteric sclerae, MMM  Neck:   Supple, trachea midline  Lungs:  CTA bilaterally. No Wheezing  Heart:  Regular rhythm and rate. No murmur   Abdomen: Dressing c/d/i. Tender.  Drains with serosanguineous drainage  Extremities: Warm, +peripheral edema  Neurologic:  normal speech, moves all extremities       Data Review:     Recent Results (from the past 24 hour(s))   GLUCOSE, POC    Collection Time: 18 11:40 AM   Result Value Ref Range    Glucose (POC) 74 65 - 100 mg/dL    Performed by [facility], POC    Collection Time: 18 12:03 PM   Result Value Ref Range    Glucose (POC) 76 65 - 100 mg/dL    Performed by Monica Castro    GLUCOSE, POC    Collection Time: 18 12:29 PM   Result Value Ref Range    Glucose (POC) 122 (H) 65 - 100 mg/dL    Performed by Monica Castro    GLUCOSE, POC    Collection Time: 18  5:17 PM   Result Value Ref Range    Glucose (POC) 99 65 - 100 mg/dL    Performed by AZIZA KURTZ(MATTIE)    GLUCOSE, POC    Collection Time: 04/18/18  9:14 PM   Result Value Ref Range    Glucose (POC) 135 (H) 65 - 100 mg/dL    Performed by AZIZA KURTZ(MATTIE)    GLUCOSE, POC    Collection Time: 04/19/18  6:22 AM   Result Value Ref Range    Glucose (POC) 92 65 - 100 mg/dL    Performed by Kim Tobin        Principal Problem:    Peritonitis ([address] (3/11/2018)    Active Problems:    Generalized abdominal pain (4/4/2018)      Other constipation (4/4/2018)      Other ascites (4/4/2018)        Medications reviewed  Current Facility-Administered Medications   Medication Dose Route Frequency    oxyCODONE IR (ROXICODONE) tablet 15 mg  15 mg Oral Q4H PRN    HYDROmorphone (DILAUDID) injection 1 mg  1 mg IntraVENous Q3H PRN    senna-docusate (PERICOLACE) 8.6-50 mg per tablet 1 Tab  1 Tab Oral BID    apixaban (ELIQUIS) tablet 5 mg  5 mg Oral BID    piperacillin-tazobactam (ZOSYN) 3.375 g in 0.9% sodium chloride (MBP/ADV) 100 mL  3.375 g IntraVENous Q12H    sodium chloride (NS) flush 5-10 mL  5-10 mL IntraVENous Q8H    sodium chloride (NS) flush 5-10 mL  5-10 mL IntraVENous PRN    lidocaine (PF) (XYLOCAINE) 10 mg/mL (1 %) injection 0.1 mL  0.1 mL SubCUTAneous PRN    midazolam (VERSED) injection 1 mg  1 mg IntraVENous PRN    epoetin pia (EPOGEN;PROCRIT) injection 10,000 Units  10,000 Units SubCUTAneous DIALYSIS TUE, THU & SAT    lactulose (CHRONULAC) solution 10 g  10 g Oral PRN    polyethylene glycol (MIRALAX) packet 17 g  17 g Oral DAILY    balsam peru-castor oil (VENELEX)  mg/gram ointment   Topical Q8H    amLODIPine (NORVASC) tablet 5 mg  5 mg Oral DAILY    carvedilol (COREG) tablet 12.5 mg  12.5 mg Oral BID WITH MEALS    dextrose 5% and 0.9% NaCl infusion  15 mL/hr IntraVENous CONTINUOUS    hydrALAZINE (APRESOLINE) 20 mg/mL injection 5 mg  5 mg IntraVENous Q6H PRN    0.9% sodium chloride infusion 250 mL  250 mL IntraVENous PRN    glucose chewable tablet 16 g  4 Tab Oral PRN    dextrose (D50W) injection syrg 12.5-25 g  12.5-25 g IntraVENous PRN    glucagon (GLUCAGEN) injection 1 mg  1 mg IntraMUSCular PRN    predniSONE (DELTASONE) tablet 5 mg  5 mg Oral DAILY    pantoprazole (PROTONIX) tablet 40 mg  40 mg Oral ACB    cyanocobalamin (VITAMIN B12) tablet 2,500 mcg  2,500 mcg Oral DAILY    hydroxychloroquine (PLAQUENIL) tablet 200 mg  200 mg Oral BID    albuterol (PROVENTIL VENTOLIN) nebulizer solution 2.5 mg  2.5 mg Nebulization Q4H PRN    gemfibrozil (LOPID) tablet 300 mg  300 mg Oral DAILY    amitriptyline (ELAVIL) tablet 25 mg  25 mg Oral QHS    sodium chloride (NS) flush 5-10 mL  5-10 mL IntraVENous Q8H    sodium chloride (NS) flush 5-10 mL  5-10 mL IntraVENous PRN    ondansetron (ZOFRAN) injection 4 mg  4 mg IntraVENous Q4H PRN    arformoterol (BROVANA) neb solution 15 mcg  15 mcg Nebulization BID RT    And    budesonide (PULMICORT) 500 mcg/2 ml nebulizer suspension  500 mcg Nebulization BID RT       Ander Recio MD

## 2018-04-19 NOTE — PROGRESS NOTES
Problem: Mobility Impaired (Adult and Pediatric)  Goal: *Acute Goals and Plan of Care (Insert Text)  Physical Therapy Goals  Revised 4/2/2018; Reviewed on 4/17/18 and remain appropriate  1. Patient will move from supine to sit and sit to supine  and roll side to side in bed with independence within 7 day(s). 2.  Patient will transfer from bed to chair and chair to bed with modified independence using the least restrictive device within 7 day(s). 3.  Patient will perform sit to stand with modified independence within 7 day(s). 4.  Patient will ambulate with modified independence for 150 feet with the least restrictive device within 7 day(s). Physical Therapy Goals  Initiated 3/16/2018  1. Patient will move from supine to sit and sit to supine  in bed with supervision/set-up within 7 day(s). 2.  Patient will transfer from bed to chair and chair to bed with supervision/set-up using the least restrictive device within 7 day(s). 3.  Patient will perform sit to stand with minimal assistance/contact guard assist within 7 day(s). 4.  Patient will ambulate with minimal assistance/contact guard assist for 50 feet with the least restrictive device within 7 day(s). physical Therapy TREATMENT  Patient: Richy Prieto (89 y.o. female)  Date: 4/19/2018  Diagnosis: Peritonitis ([address]  ESRD  PERITINITIS  Peritonitis ([address]  Procedure(s) (LRB):  DIAGNOSTIC LAPAROSCOPY WITH LAPAROSCOPIC ABDOMINAL WASH OUT  (N/A) 7 Days Post-Op  Precautions: Fall (abd drain)  Chart, physical therapy assessment, plan of care and goals were reviewed. ASSESSMENT:  Pt agreeable to PT prior to having dialysis some time this AM. Pt required CGA- Min A for sit>stand from bed. Required 2 attempts, able to come to complete stand w/ Min A midway during transfer (tactile cue at sacrum for hip extension). Pt w/ smooth gait x160FT (SBA) and use of RW, noted decreased step clearance only, steady overall.  Required 2 brief standing rest breaks d/t fatigue. Pt returned to sitting EOB as requested; all needs met/in reach. Pt encouraged to amb w/ NSG and RW at least 2-3x/day to maintain strength and endurance for independence. Pt to continue to follow. Progression toward goals:  [x]    Improving appropriately and progressing toward goals  []    Improving slowly and progressing toward goals  []    Not making progress toward goals and plan of care will be adjusted     PLAN:  Patient continues to benefit from skilled intervention to address the above impairments. Continue treatment per established plan of care. Discharge Recommendations:  Home Health w/ family assist/support  Further Equipment Recommendations for Discharge: Owns RW (per pt report)     SUBJECTIVE:   Patient stated I'm getting ready to have dialysis.     OBJECTIVE DATA SUMMARY:   Critical Behavior:  Neurologic State: Alert  Orientation Level: Oriented X4  Cognition: Follows commands  Safety/Judgement: Awareness of environment  Functional Mobility Training:  Bed Mobility:     Supine to Sit: Supervision  Sit to Supine:  (NT- pt requesting to  sit EOB )  Scooting: Stand-by assistance; Additional time (to EOB)        Transfers:  Sit to Stand: Minimum assistance;Assist x1 (x2 attempts; complete stand w/ attempt x2 Min A)  Stand to Sit: Stand-by assistance                             Balance:  Sitting: Intact  Standing: Intact; With support (RW)  Standing - Static: Constant support;Good (RW)  Standing - Dynamic : Good  Ambulation/Gait Training:  Distance (ft): 160 Feet (ft)  Assistive Device: Gait belt;Walker, rolling  Ambulation - Level of Assistance: Stand-by assistance        Gait Abnormalities: Decreased step clearance        Base of Support: Widened     Speed/Vanita: Pace decreased (<100 feet/min)  Step Length: Left shortened;Right shortened                   Stairs:                Pain:  Pain Scale 1: Numeric (0 - 10)  Pain Intensity 1: 8  Pain Location 1: Abdomen  Pain Orientation 1: Anterior  Pain Description 1: Aching  Pain Intervention(s) 1: Medication (see MAR) (given dilaudid 1mg ivp)  Activity Tolerance:   Good  Please refer to the flowsheet for vital signs taken during this treatment.   After treatment:   []    Patient left in no apparent distress sitting up in chair  [x]    Patient left in no apparent distress sitting edge of bed  [x]    Call bell left within reach  [x]    Nursing notified  []    Caregiver present  []    Bed alarm activated    COMMUNICATION/COLLABORATION:   The patients plan of care was discussed with: Registered Nurse    Christian Lynch PTA   Time Calculation: 12 mins

## 2018-04-19 NOTE — PROGRESS NOTES
Spiritual Care Partner Volunteer visited patient in Rm 550 on 4/19/18. Documented by:   Chaplain Watson MDiv, MACE  [phone])

## 2018-04-20 VITALS
HEART RATE: 96 BPM | WEIGHT: 161.38 LBS | BODY MASS INDEX: 26.89 KG/M2 | TEMPERATURE: 98.4 F | HEIGHT: 65 IN | DIASTOLIC BLOOD PRESSURE: 85 MMHG | RESPIRATION RATE: 20 BRPM | SYSTOLIC BLOOD PRESSURE: 137 MMHG | OXYGEN SATURATION: 99 %

## 2018-04-20 LAB
ERYTHROCYTE [DISTWIDTH] IN BLOOD BY AUTOMATED COUNT: 19.6 % (ref 11.5–14.5)
GLUCOSE BLD STRIP.AUTO-MCNC: 100 MG/DL (ref 65–100)
GLUCOSE BLD STRIP.AUTO-MCNC: 123 MG/DL (ref 65–100)
GLUCOSE BLD STRIP.AUTO-MCNC: 95 MG/DL (ref 65–100)
HCT VFR BLD AUTO: 23.4 % (ref 35–47)
HGB BLD-MCNC: 6.9 G/DL (ref 11.5–16)
MCH RBC QN AUTO: 27.4 PG (ref 26–34)
MCHC RBC AUTO-ENTMCNC: 29.5 G/DL (ref 30–36.5)
MCV RBC AUTO: 92.9 FL (ref 80–99)
NRBC # BLD: 0 K/UL (ref 0–0.01)
NRBC BLD-RTO: 0 PER 100 WBC
PLATELET # BLD AUTO: 279 K/UL (ref 150–400)
PMV BLD AUTO: 10 FL (ref 8.9–12.9)
RBC # BLD AUTO: 2.52 M/UL (ref 3.8–5.2)
SERVICE CMNT-IMP: ABNORMAL
SERVICE CMNT-IMP: NORMAL
SERVICE CMNT-IMP: NORMAL
WBC # BLD AUTO: 6.3 K/UL (ref 3.6–11)

## 2018-04-20 PROCEDURE — 74011250637 HC RX REV CODE- 250/637: Performed by: HOSPITALIST

## 2018-04-20 PROCEDURE — 82962 GLUCOSE BLOOD TEST: CPT

## 2018-04-20 PROCEDURE — 74011000250 HC RX REV CODE- 250: Performed by: HOSPITALIST

## 2018-04-20 PROCEDURE — 74011250636 HC RX REV CODE- 250/636: Performed by: SURGERY

## 2018-04-20 PROCEDURE — 74011250636 HC RX REV CODE- 250/636: Performed by: HOSPITALIST

## 2018-04-20 PROCEDURE — 36415 COLL VENOUS BLD VENIPUNCTURE: CPT | Performed by: SURGERY

## 2018-04-20 PROCEDURE — 94640 AIRWAY INHALATION TREATMENT: CPT

## 2018-04-20 PROCEDURE — 74011000258 HC RX REV CODE- 258: Performed by: SURGERY

## 2018-04-20 PROCEDURE — 74011250637 HC RX REV CODE- 250/637: Performed by: INTERNAL MEDICINE

## 2018-04-20 PROCEDURE — 74011250637 HC RX REV CODE- 250/637: Performed by: SURGERY

## 2018-04-20 PROCEDURE — 74011636637 HC RX REV CODE- 636/637: Performed by: HOSPITALIST

## 2018-04-20 PROCEDURE — 86923 COMPATIBILITY TEST ELECTRIC: CPT | Performed by: HOSPITALIST

## 2018-04-20 PROCEDURE — 85027 COMPLETE CBC AUTOMATED: CPT | Performed by: SURGERY

## 2018-04-20 PROCEDURE — 97110 THERAPEUTIC EXERCISES: CPT

## 2018-04-20 PROCEDURE — 74011250636 HC RX REV CODE- 250/636: Performed by: NURSE PRACTITIONER

## 2018-04-20 PROCEDURE — 97116 GAIT TRAINING THERAPY: CPT

## 2018-04-20 PROCEDURE — P9016 RBC LEUKOCYTES REDUCED: HCPCS | Performed by: HOSPITALIST

## 2018-04-20 PROCEDURE — 36430 TRANSFUSION BLD/BLD COMPNT: CPT

## 2018-04-20 PROCEDURE — 86900 BLOOD TYPING SEROLOGIC ABO: CPT | Performed by: HOSPITALIST

## 2018-04-20 RX ORDER — POLYETHYLENE GLYCOL 3350 17 G/17G
17 POWDER, FOR SOLUTION ORAL DAILY
Qty: 30 PACKET | Refills: 0 | Status: SHIPPED | OUTPATIENT
Start: 2018-04-21 | End: 2018-07-19

## 2018-04-20 RX ORDER — AMLODIPINE BESYLATE 5 MG/1
5 TABLET ORAL DAILY
Qty: 30 TAB | Refills: 1 | Status: ON HOLD | OUTPATIENT
Start: 2018-04-21 | End: 2018-06-28

## 2018-04-20 RX ORDER — HYDROMORPHONE HYDROCHLORIDE 2 MG/ML
1 INJECTION, SOLUTION INTRAMUSCULAR; INTRAVENOUS; SUBCUTANEOUS ONCE
Status: COMPLETED | OUTPATIENT
Start: 2018-04-20 | End: 2018-04-20

## 2018-04-20 RX ORDER — HYDROMORPHONE HYDROCHLORIDE 4 MG/1
4 TABLET ORAL
Status: DISCONTINUED | OUTPATIENT
Start: 2018-04-20 | End: 2018-04-20 | Stop reason: HOSPADM

## 2018-04-20 RX ORDER — HYDROMORPHONE HYDROCHLORIDE 4 MG/1
4 TABLET ORAL
Qty: 30 TAB | Refills: 0 | Status: ON HOLD | OUTPATIENT
Start: 2018-04-20 | End: 2018-04-30

## 2018-04-20 RX ORDER — HYDROMORPHONE HYDROCHLORIDE 2 MG/1
1 TABLET ORAL
Status: DISCONTINUED | OUTPATIENT
Start: 2018-04-20 | End: 2018-04-20

## 2018-04-20 RX ADMIN — HYDROXYCHLOROQUINE SULFATE 200 MG: 200 TABLET, FILM COATED ORAL at 08:24

## 2018-04-20 RX ADMIN — HYDROMORPHONE HYDROCHLORIDE 1 MG: 2 INJECTION INTRAMUSCULAR; INTRAVENOUS; SUBCUTANEOUS at 04:55

## 2018-04-20 RX ADMIN — HYDROMORPHONE HYDROCHLORIDE 4 MG: 4 TABLET ORAL at 16:00

## 2018-04-20 RX ADMIN — PANTOPRAZOLE SODIUM 40 MG: 40 TABLET, DELAYED RELEASE ORAL at 07:12

## 2018-04-20 RX ADMIN — HYDROMORPHONE HYDROCHLORIDE 4 MG: 4 TABLET ORAL at 19:56

## 2018-04-20 RX ADMIN — APIXABAN 5 MG: 5 TABLET, FILM COATED ORAL at 08:24

## 2018-04-20 RX ADMIN — PREDNISONE 5 MG: 5 TABLET ORAL at 08:24

## 2018-04-20 RX ADMIN — GEMFIBROZIL 300 MG: 600 TABLET ORAL at 08:24

## 2018-04-20 RX ADMIN — AMLODIPINE BESYLATE 5 MG: 5 TABLET ORAL at 08:23

## 2018-04-20 RX ADMIN — CARVEDILOL 12.5 MG: 12.5 TABLET, FILM COATED ORAL at 08:23

## 2018-04-20 RX ADMIN — STANDARDIZED SENNA CONCENTRATE AND DOCUSATE SODIUM 1 TABLET: 8.6; 5 TABLET, FILM COATED ORAL at 17:19

## 2018-04-20 RX ADMIN — Medication 10 ML: at 05:00

## 2018-04-20 RX ADMIN — HYDROXYCHLOROQUINE SULFATE 200 MG: 200 TABLET, FILM COATED ORAL at 17:19

## 2018-04-20 RX ADMIN — PIPERACILLIN SODIUM,TAZOBACTAM SODIUM 3.38 G: 3; .375 INJECTION, POWDER, FOR SOLUTION INTRAVENOUS at 07:12

## 2018-04-20 RX ADMIN — HYDROMORPHONE HYDROCHLORIDE 1 MG: 2 INJECTION INTRAMUSCULAR; INTRAVENOUS; SUBCUTANEOUS at 14:12

## 2018-04-20 RX ADMIN — Medication 2500 MCG: at 08:23

## 2018-04-20 RX ADMIN — HYDROMORPHONE HYDROCHLORIDE 1 MG: 2 INJECTION INTRAMUSCULAR; INTRAVENOUS; SUBCUTANEOUS at 01:41

## 2018-04-20 RX ADMIN — APIXABAN 5 MG: 5 TABLET, FILM COATED ORAL at 17:19

## 2018-04-20 RX ADMIN — HYDROMORPHONE HYDROCHLORIDE 4 MG: 4 TABLET ORAL at 11:30

## 2018-04-20 RX ADMIN — CARVEDILOL 12.5 MG: 12.5 TABLET, FILM COATED ORAL at 17:19

## 2018-04-20 RX ADMIN — ARFORMOTEROL TARTRATE 15 MCG: 15 SOLUTION RESPIRATORY (INHALATION) at 07:56

## 2018-04-20 RX ADMIN — HYDROMORPHONE HYDROCHLORIDE 1 MG: 2 INJECTION INTRAMUSCULAR; INTRAVENOUS; SUBCUTANEOUS at 08:30

## 2018-04-20 RX ADMIN — BUDESONIDE 500 MCG: 0.5 INHALANT RESPIRATORY (INHALATION) at 07:56

## 2018-04-20 NOTE — PROGRESS NOTES
Problem: Mobility Impaired (Adult and Pediatric)  Goal: *Acute Goals and Plan of Care (Insert Text)  Physical Therapy Goals  Revised 4/2/2018; Reviewed on 4/17/18 and remain appropriate  1. Patient will move from supine to sit and sit to supine  and roll side to side in bed with independence within 7 day(s). 2.  Patient will transfer from bed to chair and chair to bed with modified independence using the least restrictive device within 7 day(s). 3.  Patient will perform sit to stand with modified independence within 7 day(s). 4.  Patient will ambulate with modified independence for 150 feet with the least restrictive device within 7 day(s). Physical Therapy Goals  Initiated 3/16/2018  1. Patient will move from supine to sit and sit to supine  in bed with supervision/set-up within 7 day(s). 2.  Patient will transfer from bed to chair and chair to bed with supervision/set-up using the least restrictive device within 7 day(s). 3.  Patient will perform sit to stand with minimal assistance/contact guard assist within 7 day(s). 4.  Patient will ambulate with minimal assistance/contact guard assist for 50 feet with the least restrictive device within 7 day(s). physical Therapy TREATMENT  Patient: Patricia Jason (16 y.o. female)  Date: 4/20/2018  Diagnosis: Peritonitis ([address]  ESRD  PERITINITIS  Peritonitis ([address]  Procedure(s) (LRB):  DIAGNOSTIC LAPAROSCOPY WITH LAPAROSCOPIC ABDOMINAL WASH OUT  (N/A) 8 Days Post-Op  Precautions: Fall (abd drain)  Chart, physical therapy assessment, plan of care and goals were reviewed. ASSESSMENT:  Patient continues to progress well this week with therapy. SBA level for bed mobility and gait but sit to stand transfer still requires MIN A x 1. However, this is still improved over prior difficulty and 2 person assist needed. Patient still fatiguing in LEs quickly with gait and required 3 standing rest breaks during 140' walk today with RW.   Returned to her bedside chair and performed LE therex as below. Encouraged patient to be up in chair as much as possible and to walk with mom and/or nursing twice daily in addition to any physical therapy sessions. Patient in agreement. Continue to recommend HHPT upon d/c to further progress strength and mobility independence, anticipate patient will be able to d/c home with family assist when medically appropriate. Progression toward goals:  [x]    Improving appropriately and progressing toward goals  []    Improving slowly and progressing toward goals  []    Not making progress toward goals and plan of care will be adjusted     PLAN:  Patient continues to benefit from skilled intervention to address the above impairments. Continue treatment per established plan of care. Discharge Recommendations:  Home Health  Further Equipment Recommendations for Discharge:  Rolling walker     SUBJECTIVE:   Patient stated I'm feeling much better than the last time you saw me.     OBJECTIVE DATA SUMMARY:   Critical Behavior:  Neurologic State: Alert, Appropriate for age, Eyes open spontaneously  Orientation Level: Oriented X4  Cognition: Appropriate decision making, Appropriate for age attention/concentration, Appropriate safety awareness, Follows commands  Safety/Judgement: Awareness of environment  Functional Mobility Training:  Bed Mobility:     Supine to Sit: Supervision     Scooting: Supervision        Transfers:  Sit to Stand: Minimum assistance;Assist x1 (One attempt)  Stand to Sit: Stand-by assistance                             Balance:  Sitting: Intact  Standing: Intact; With support (RW)  Standing - Static: Good  Standing - Dynamic : Good  Ambulation/Gait Training:  Distance (ft): 140 Feet (ft)  Assistive Device: Gait belt;Walker, rolling  Ambulation - Level of Assistance: Stand-by assistance        Gait Abnormalities: Decreased step clearance        Base of Support: Widened     Speed/Vanita: Pace decreased (<100 feet/min) (frequent rest breaks (x 3))  Step Length: Left shortened;Right shortened                    Therapeutic Exercises:   SEated: LAQs, hip flex, heel raises, toe raises, hip abd/add  All x 20 reps each LE    Pain:  Pain Scale 1: Numeric (0 - 10)  Pain Intensity 1: 6  Pain Location 1: Abdomen  Pain Orientation 1: Lower  Pain Description 1: Aching  Pain Intervention(s) 1: Medication (see MAR) (given dilaudid 4mg po)  Activity Tolerance:   Fair - 140' ambulation with SBA and frequent rest breaks due to LE fatigue    Please refer to the flowsheet for vital signs taken during this treatment.   After treatment:   [x]    Patient left in no apparent distress sitting up in chair  []    Patient left in no apparent distress in bed  [x]    Call bell left within reach  [x]    Nursing notified  []    Caregiver present  []    Bed alarm activated    COMMUNICATION/COLLABORATION:   The patients plan of care was discussed with: Registered Nurse    Veronica Nuno, PT   Time Calculation: 25 mins

## 2018-04-20 NOTE — PROGRESS NOTES
Cm informed patient would be discharged home today and would need [facility] RN as well. Cm notified [facility] of plans for discharge today.   [employer] , [place]

## 2018-04-20 NOTE — PROGRESS NOTES
Hospital PCP KWABENA JUNG follow-up appointment with Florinda Munzo on Tuesday April 24,2018 @ 2:00 p.m. Patient will have [facility].   Added to AVS.   Ben Chan CM Specialist

## 2018-04-20 NOTE — PROGRESS NOTES
Patient name: Michael Hummel  MRN: [id number]    Nephrology Progress note:    Assessment:  ESRD: [facility] . Switched back to TTS schedule  E. Coli Bacteremia: source intra-abd source/ E. Coli peritonitis. IV zosyn. Recent hx of Candida peritonitis. S/p drains    HTN:  Hx of PE: on Eliquis  Lupus: Anemia 2 to ESRD/Lupus: Hgb remains below goal - on EPO  SHPT: Hypophosphatemia-> give Neutra-Phos  Protein malnutrition-on Nepro  Edema: 3rd spacing-> increase UF on HD as tolerated    Plan:  HD tomm per schedule  L Drains still in place-draining some. R removed  Ct IV ABx  Ct  Nepro    Subjective   Feels good.  No acute c/o    Exam:  Visit Vitals    /90 (BP 1 Location: Left arm, BP Patient Position: At rest)    Pulse (!) 105    Temp 98 °F (36.7 °C)    Resp 20    Ht 5' 5\" (1.651 m)    Wt 73.2 kg (161 lb 6 oz)    SpO2 98%    Breastfeeding No    BMI 26.85 kg/m2     Wt Readings from Last 3 Encounters:   04/20/18 73.2 kg (161 lb 6 oz)   03/05/18 68 kg (150 lb)   01/29/18 69.4 kg (153 lb)       Intake/Output Summary (Last 24 hours) at 04/20/18 1317  Last data filed at 04/20/18 1307   Gross per 24 hour   Intake              320 ml   Output             1595 ml   Net            -1275 ml     NAD  Muscle wasting+   1+ edema  +abd drains in place on L  AOx3  R AV access patent    Labs/Data:    Lab Results   Component Value Date/Time    WBC 6.3 04/20/2018 11:07 AM    Hemoglobin (POC) 7.8 (L) 01/19/2018 12:30 PM    HGB 6.9 (L) 04/20/2018 11:07 AM    Hematocrit (POC) 23 (L) 01/19/2018 12:30 PM    HCT 23.4 (L) 04/20/2018 11:07 AM    PLATELET 253 55/31/1371 11:07 AM    MCV 92.9 04/20/2018 11:07 AM       Lab Results   Component Value Date/Time    Sodium 144 04/16/2018 02:30 PM    Potassium 4.7 04/16/2018 02:30 PM    Chloride 110 (H) 04/16/2018 02:30 PM    CO2 26 04/16/2018 02:30 PM    Anion gap 8 04/16/2018 02:30 PM    Glucose 101 (H) 04/16/2018 02:30 PM    BUN 19 04/16/2018 02:30 PM    Creatinine 5.61 (H) 04/16/2018 02:30 PM    BUN/Creatinine ratio 3 (L) 04/16/2018 02:30 PM    GFR est AA 11 (L) 04/16/2018 02:30 PM    GFR est non-AA 9 (L) 04/16/2018 02:30 PM    Calcium 7.4 (L) 04/16/2018 02:30 PM        Discussed with patient and RN    Berenice Nicole MD  [phone]

## 2018-04-20 NOTE — PROGRESS NOTES
Bedside and Verbal shift change report given to Sonja Brizuela (oncoming nurse) by Travis Smart RN (offgoing nurse). Report included the following information SBAR, Kardex, Procedure Summary, MAR and Recent Results.

## 2018-04-20 NOTE — DISCHARGE INSTRUCTIONS
Please bring this form with you to show your primary care provider at your follow-up appointment. Primary care provider:  Dr. Lorraine Gonzalez NP    Discharging provider:  Mariella Mehta MD    You have been admitted to the hospital with the following diagnoses:  Peritonitis [facility])  ESRD  PERITINITIS     FOLLOW-UP CARE RECOMMENDATIONS:    APPOINTMENTS:  Follow-up Information     Follow up With Details Comments Nayan  [facility] [id number]  9981 Gino Avila NP In 2 weeks discharge follow up  [address] [id number]      Cesar Carvajal MD In 1 week , call to make appointment for drain removal  [address] [facility]      Ramses Mukherjee MD  Follow on T-Th-Sat [address] [facility] [address] [place] [address]              FOLLOW-UP TESTS recommended:   - Please make appointment with Surgery for drain removal  - Follow up at  for T-Th-Sat    PENDING TEST RESULTS:  At the time of your discharge the following test results are still pending: none  Please make sure you review these results with your outpatient follow-up provider(s). SYMPTOMS to watch for: chest pain, shortness of breath, fever, chills, nausea, vomiting, diarrhea, change in mentation, falling, weakness, bleeding. DIET/what to eat: Renal diet    ACTIVITY:  Activity as tolerated    WOUND CARE: NONE    EQUIPMENT needed:  NONE      What to do if new or unexpected symptoms occur? If you experience any of the above symptoms (or should other concerns or questions arise after discharge) please call your primary care physician. Return to the emergency room if you cannot get hold of your doctor. · It is very important that you keep your follow-up appointment(s).   · Please bring discharge papers, medication list (and/or medication bottles) to your follow-up appointments for review by your outpatient provider(s). · Please check the list of medications and be sure it includes every medication (even non-prescription medications) that your provider wants you to take. · It is important that you take the medication exactly as they are prescribed. · Keep your medication in the bottles provided by the pharmacist and keep a list of the medication names, dosages, and times to be taken in your wallet. · Do not take other medications without consulting your doctor. · If you have any questions about your medications or other instructions, please talk to your nurse or care provider before you leave the hospital.    I understand that if any problems occur once I am at home I am to contact my physician. These instructions were explained to me and I had the opportunity to ask questions. Surgical Drain Care: Care Instructions  What is a surgical drain? After a surgery, fluid may collect inside your body in the surgical area. This makes an infection or other problems more likely. A surgical drain allows the fluid to flow out. The doctor will put a thin rubber tube into the area of your body where the fluid is likely to collect. The rubber tube will carry the fluid outside your body. The most common type of surgical drain carries the fluid into a collection bulb that you empty. This is called a Simone-Feng drain. The drain uses suction created by the bulb to pull the fluid from your body into the bulb. The rubber tube will probably be held in place by one or two stitches in your skin. Most people attach the bulb with a safety pin to clothing or near the bandage so that it doesn't flip around or pull on the stitches. When you first get the drain, the fluid will be bloody. It will change color from red to pink to a light yellow or clear as the wound heals and the fluid starts to go away.   Your doctor may give you specific information on when you no longer need the drain and when it will be removed. In general, you will need the drain until you are collecting less than about 2 tablespoons of fluid in 24 hours. Follow-up care is a key part of your treatment and safety. Be sure to make and go to all appointments, and call your doctor if you are having problems. It's also a good idea to know your test results and keep a list of the medicines you take. How can you care for yourself at home? Fluid collection  Follow any instructions your doctor gives you. How often you empty the bulb depends on how much fluid is draining. Empty the bulb when it is half full. To empty the bulb:  · Wash your hands with soap and water. · Take the plug out of the bulb. · Empty the bulb. If your doctor asks you to measure the fluid, empty the fluid into a measuring cup, and write down the color and how much you collected. Your doctor will want to know this information. How often you empty the bulb depends on how much fluid there is. Doctors often suggest emptying it when it's about half full. · Clean the plug with alcohol. · Squeeze the bulb until it is flat. This removes all the air from the bulb. You may need to put the bulb on a table or a counter to flatten it. · Keep the bulb flat and put the plug in. · The bulb should stay flat after you put the plug back in. This creates the suction that pulls the fluid into the bulb. · Empty the fluid into the toilet. · Wash your hands. Bandage care  You may have a bandage. Your doctor will tell you how often to change it. · Wash your hands with soap and water. · Take off the bandage from around the drain. · Clean the drain site and the skin around it with soap and water. Use gauze or a cotton swab. · When the site is dry, put on a new bandage. Drain care  Squeezing or \"milking\" the tube can help prevent clogs so that it drains correctly. Your doctor will tell you when you need to do this.  In general, you do this when:  · You see a clot in the tube that is preventing fluid from draining. The clot may look like a dark, stringy lining. · You see fluid leaking around the tube where it goes into the skin. · You think there is no suction in the drain. To milk the tube:  · Use one hand to hold and pinch the tube where it leaves the skin. · With the other hand, pinch the tube with your thumb and first finger just below where you're holding it. · Slowly and firmly push your thumb and first finger down the tubing toward the bulb. · Do this as many times as you need to. The clot should move down the tube and into the bulb. When should you call for help? Call your doctor now or seek immediate medical care if:  ? · You have signs of infection, such as:  ¨ Increased pain, swelling, warmth, or redness around the area. ¨ Red streaks leading from the area. ¨ Pus draining from the area. ¨ A fever. ? · You see a sudden change in the color or smell of the drainage. ? · The tube is coming loose where it leaves your skin. ? Watch closely for changes in your health, and be sure to contact your doctor if:  ? · You see a lot of fluid around the drain. ? · You cannot remove a clot from the tube by milking the tube. Where can you learn more? Go to http://jorge-rochelle.info/. Enter K117 in the search box to learn more about \"Surgical Drain Care: Care Instructions. \"  Current as of: March 20, 2017  Content Version: 11.4  © 0382-0823 [employer]. Care instructions adapted under license by [employer] (which disclaims liability or warranty for this information). If you have questions about a medical condition or this instruction, always ask your healthcare professional. [place] [address] any warranty or liability for your use of this information.

## 2018-04-20 NOTE — ROUTINE PROCESS
I have reviewed discharge instructions with the patient. The patient verbalized understanding. Patient sent home with drain care supplies and prescriptions.

## 2018-04-20 NOTE — PROGRESS NOTES
Hospitalist Progress Note            This is a 29-year-old female with multiple medical problems including SLE, end-stage renal disease (on hemodialysis, TTS), pulmonary embolism, on Eliquis (2012), hyperlipidemia, hypertension, recent Candida peritonitis, chronic bilateral pain.  She comes over here because of abdominal pain. Daily Progress Note: 4/20/2018    Assessment/Plan:   Acute peritonitis (e. coli) with sepsis  - also with recent candida peritonitis  - peritoneal fluid heavy E coli on 3/11 and 3/18  - drain exchanged and upsized by IR on 4/5  - completed IV abx on 4/6/18  - has had multiple CT scans showing interval changes, but had to get washout due to loculations and abscess  - went to OR last week for the washout. Now has bilateral drains with erosanguinous output  - pain control  - appreciate Surgery recs  - on Zosyn since 4/12   - d/w ID, will send Fluid culture from the drain, if negative, plan to stop abx  - Left JENNIFER Drain Fluid Cx: no growth   - surgery to follow up regarding left drain removal       HTN  -continue current meds     Thrombocytopenia   -resolved      Bilateral LE edema  -likely third-spacing  -Dopplers negative for DVT      SLE  -continue home meds  -D/C'd Imuran, allopurinol by Nephro with acute infection and thrombocytopenia      History of PE  - on Eliquis since 2012  - per documentation from prior attending: \"appreciate discussion with PMD 3/13. The patient was seen by hematology in 2013 but seems was lost to follow up. I think at that time the plan was to stop anticoagulation if her SLE is stable. Spoke to Dr Jose A Garduno, he feels that the patient can come off Eliquis. His records do not suggest any recurrence of DVT/PE.  Spoke to Dr Annabel Cox, he has not seen the patient since >1 yr. If antiphospholipid antibodies negative then the patient can come off Eliquis\"  - 10/17 V/Q high probability for PE  - per Oncology the patient should be continued to Eliquis indefinitely      Hypophosphatemia:  -replete prn     ESRD  -on HD MWF   -RUE AVG was malfunctioning, s/p OR to fix  -Nephrology following     Anemia of chronic disease  -Transfused 2 units PRBC with HD 3/15     Hepatic steatosis    Severe protein calorie malnutrition  -consult to nutrition    Constipation:  -bowel regimen      Full code  Eliquis    Discussed with patient, Nurse  Dispo: TBD       Subjective:     Pt seen and examined  Pain not controlled  Otherwise doing well       Review of Systems:   As above    Objective:     Visit Vitals    /90 (BP 1 Location: Left arm, BP Patient Position: At rest)    Pulse (!) 105    Temp 98 °F (36.7 °C)    Resp 20    Ht 5' 5\" (1.651 m)    Wt 73.2 kg (161 lb 6 oz)    SpO2 98%    Breastfeeding No    BMI 26.85 kg/m2    O2 Flow Rate (L/min): 2 l/min O2 Device: Room air    Temp (24hrs), Av.6 °F (37 °C), Min:98 °F (36.7 °C), Max:99 °F (37.2 °C)    701 - 1900  In: -   Out: 10 [Drains:10]    1901 -  0700  In: 320 [P.O.:200; I.V.:120]  Out: 0 [Drains:125]    EXAM:  General: Chronically-ill appearing, NAD    HEENT: Anicteric sclerae, MMM  Neck:   Supple, trachea midline  Lungs:  CTA bilaterally. No Wheezing  Heart:  Regular rhythm and rate. No murmur   Abdomen: Dressing c/d/i. Tender.  Drains with serosanguineous drainage  Extremities: Warm, +peripheral edema  Neurologic:  normal speech, moves all extremities       Data Review:     Recent Results (from the past 24 hour(s))   GLUCOSE, POC    Collection Time: 18 11:41 AM   Result Value Ref Range    Glucose (POC) 89 65 - 100 mg/dL    Performed by [address] [place] [address], POC    Collection Time: 18  4:30 PM   Result Value Ref Range    Glucose (POC) 86 65 - 100 mg/dL    Performed by Janette Leblanc, POC    Collection Time: 18  9:11 PM   Result Value Ref Range    Glucose (POC) 119 (H) 65 - 100 mg/dL    Performed by Aicha Card [address], POC    Collection Time: 18 7:18 AM   Result Value Ref Range    Glucose (POC) 100 65 - 100 mg/dL    Performed by Micheal Pérez, POC    Collection Time: 04/20/18 11:19 AM   Result Value Ref Range    Glucose (POC) 95 65 - 100 mg/dL    Performed by Alban Ghosh        Principal Problem:    Peritonitis ([address] (3/11/2018)    Active Problems:    Generalized abdominal pain (4/4/2018)      Other constipation (4/4/2018)      Other ascites (4/4/2018)        Medications reviewed  Current Facility-Administered Medications   Medication Dose Route Frequency    HYDROmorphone (DILAUDID) tablet 4 mg  4 mg Oral Q4H PRN    senna-docusate (PERICOLACE) 8.6-50 mg per tablet 1 Tab  1 Tab Oral BID    apixaban (ELIQUIS) tablet 5 mg  5 mg Oral BID    piperacillin-tazobactam (ZOSYN) 3.375 g in 0.9% sodium chloride (MBP/ADV) 100 mL  3.375 g IntraVENous Q12H    sodium chloride (NS) flush 5-10 mL  5-10 mL IntraVENous Q8H    sodium chloride (NS) flush 5-10 mL  5-10 mL IntraVENous PRN    lidocaine (PF) (XYLOCAINE) 10 mg/mL (1 %) injection 0.1 mL  0.1 mL SubCUTAneous PRN    midazolam (VERSED) injection 1 mg  1 mg IntraVENous PRN    epoetin pia (EPOGEN;PROCRIT) injection 10,000 Units  10,000 Units SubCUTAneous DIALYSIS TUE, THU & SAT    lactulose (CHRONULAC) solution 10 g  10 g Oral PRN    polyethylene glycol (MIRALAX) packet 17 g  17 g Oral DAILY    balsam peru-castor oil (VENELEX)  mg/gram ointment   Topical Q8H    amLODIPine (NORVASC) tablet 5 mg  5 mg Oral DAILY    carvedilol (COREG) tablet 12.5 mg  12.5 mg Oral BID WITH MEALS    dextrose 5% and 0.9% NaCl infusion  15 mL/hr IntraVENous CONTINUOUS    hydrALAZINE (APRESOLINE) 20 mg/mL injection 5 mg  5 mg IntraVENous Q6H PRN    0.9% sodium chloride infusion 250 mL  250 mL IntraVENous PRN    glucose chewable tablet 16 g  4 Tab Oral PRN    dextrose (D50W) injection syrg 12.5-25 g  12.5-25 g IntraVENous PRN    glucagon (GLUCAGEN) injection 1 mg  1 mg IntraMUSCular PRN    predniSONE (DELTASONE) tablet 5 mg  5 mg Oral DAILY    pantoprazole (PROTONIX) tablet 40 mg  40 mg Oral ACB    cyanocobalamin (VITAMIN B12) tablet 2,500 mcg  2,500 mcg Oral DAILY    hydroxychloroquine (PLAQUENIL) tablet 200 mg  200 mg Oral BID    albuterol (PROVENTIL VENTOLIN) nebulizer solution 2.5 mg  2.5 mg Nebulization Q4H PRN    gemfibrozil (LOPID) tablet 300 mg  300 mg Oral DAILY    amitriptyline (ELAVIL) tablet 25 mg  25 mg Oral QHS    sodium chloride (NS) flush 5-10 mL  5-10 mL IntraVENous Q8H    sodium chloride (NS) flush 5-10 mL  5-10 mL IntraVENous PRN    ondansetron (ZOFRAN) injection 4 mg  4 mg IntraVENous Q4H PRN    arformoterol (BROVANA) neb solution 15 mcg  15 mcg Nebulization BID RT    And    budesonide (PULMICORT) 500 mcg/2 ml nebulizer suspension  500 mcg Nebulization BID RT       Madina Bazzi MD

## 2018-04-20 NOTE — PROGRESS NOTES
Bedside and Verbal shift change report given to Shiela Reddy RN (oncoming nurse) by Jaya Bolivar RN (offgoing nurse). Report included the following information SBAR, Kardex, Procedure Summary, Intake/Output, MAR and Recent Results.

## 2018-04-20 NOTE — DISCHARGE SUMMARY
Discharge Summary     Patient:  Marjorie Arango       MRN: [id number]       YOB: 1985       Age: 29 y.o. Date of admission:  3/11/2018    Date of discharge:  4/20/2018    Primary care provider: Dr. Governor Vivek NP    Admitting provider:  Tiny Elise MD    Discharging provider:  Presley Giles MD - [phone]  If unavailable, call [phone] and ask the  to page the triage hospitalist.    Consultations  · IP CONSULT TO HOSPITALIST  · IP CONSULT TO NEPHROLOGY  · IP CONSULT TO INFECTIOUS DISEASES  · IP CONSULT TO GENERAL SURGERY  · IP CONSULT TO GENERAL SURGERY  · IP CONSULT TO VASCULAR SURGERY    Procedures  · Procedure(s):  · DIAGNOSTIC LAPAROSCOPY WITH LAPAROSCOPIC ABDOMINAL [address] OUT     Discharge destination: HOME with [facility]. The patient is stable for discharge. Admission diagnosis  · Peritonitis ([address]  · ESRD  · PERITINITIS     Current Discharge Medication List      START taking these medications    Details   amLODIPine (NORVASC) 5 mg tablet Take 1 Tab by mouth daily. Qty: 30 Tab, Refills: 1      HYDROmorphone (DILAUDID) 4 mg tablet Take 1 Tab by mouth every four (4) hours as needed. Max Daily Amount: 24 mg. Qty: 30 Tab, Refills: 0    Associated Diagnoses: Peritonitis ([address]      polyethylene glycol (MIRALAX) 17 gram packet Take 1 Packet by mouth daily. Qty: 30 Packet, Refills: 0         CONTINUE these medications which have NOT CHANGED    Details   amitriptyline (ELAVIL) 25 mg tablet Take 1 Tab by mouth nightly. PRN  Qty: 30 Tab, Refills: 5    Associated Diagnoses: Chronic bilateral low back pain without sciatica      predniSONE (DELTASONE) 5 mg tablet Take 5 mg by mouth daily. fluticasone-vilanterol (BREO ELLIPTA) 200-25 mcg/dose inhaler Take 1 Puff by inhalation daily.  Rinse mouth out after use  Qty: 1 Inhaler, Refills: 5    Associated Diagnoses: Acute bronchiolitis with bronchospasm      gemfibrozil (LOPID) 600 mg tablet Take 300 mg by mouth daily. carvedilol (COREG) 6.25 mg tablet Take 12.5 mg by mouth two (2) times daily (with meals). apixaban (ELIQUIS) 5 mg tablet Take 5 mg by mouth every twelve (12) hours. azaTHIOprine (IMURAN) 50 mg tablet Take 50 mg by mouth daily (after breakfast). albuterol (PROVENTIL HFA, VENTOLIN HFA, PROAIR HFA) 90 mcg/actuation inhaler Take 1-2 Puffs by inhalation every four (4) hours as needed for Wheezing or Shortness of Breath. Qty: 1 Inhaler, Refills: 2      hydroxychloroquine (PLAQUENIL) 200 mg tablet Take 200 mg by mouth two (2) times a day. allopurinol (ZYLOPRIM) 100 mg tablet Take 100 mg by mouth daily (after breakfast). Needs to TAKE on a full stomach; will cause her to be nauseated. cyanocobalamin (VITAMIN B-12) 2,500 mcg sublingual tablet Take 1 Tab by mouth daily. Qty: 90 Tab, Refills: 1    Associated Diagnoses: Leukopenia; Low vitamin B12 level; Hypotension due to drugs      pantoprazole (PROTONIX) 40 mg tablet Take 1 Tab by mouth Daily (before breakfast). Qty: 30 Tab, Refills: 0      senna (SENNA) 8.6 mg tablet Take 1 Tab by mouth daily as needed. STOP taking these medications       oxyCODONE IR (ROXICODONE) 5 mg immediate release tablet Comments:   Reason for Stopping:                Follow-up Information     Follow up With Details Comments [address]  259 [facility] [address] 5357 [facility] NP In 2 weeks discharge follow up  Sonja Rouse 8 364 [facility]      Tasneem Castillo MD In 1 week , call to make appointment for drain removal  1801 [facility] [address] [facility] [address] MD  Follow on T-Th-Sat 163 [facility],  [address]  [address] [facility]  [phone]            Final discharge diagnoses and brief hospital course  Please also refer to the admission H&P for details on the presenting problem. 27-year-old female with multiple medical problems including SLE, end-stage renal disease (on hemodialysis, TTS), pulmonary embolism, on Eliquis (2012), hyperlipidemia, hypertension, recent Candida peritonitis, chronic bilateral pain.  She comes over here because of abdominal pain. Acute peritonitis (e. coli) with sepsis  - also with recent candida peritonitis  - peritoneal fluid heavy E coli on 3/11 and 3/18  - drain exchanged and upsized by IR on 4/5  - completed IV abx on 4/6/18  - has had multiple CT scans showing interval changes, but had to get washout due to loculations and abscess  - went to OR last week for the washout. Now has bilateral drains with erosanguinous output  - pain control  - appreciate Surgery recs  - d/w ID, will send Fluid culture from the drain, if negative, plan to stop abx  - Left JENNIFER Drain Fluid Cx: no growth, d/c Zosyn 4/12--4/20  - left JENNIFER drain care at home.      HTN  -continue current meds      Thrombocytopenia   -resolved      Bilateral LE edema  -likely third-spacing  -Dopplers negative for DVT      SLE  -continue home meds  -D/C'd Imuran, allopurinol by Nephro with acute infection and thrombocytopenia  - will resume at discharge as infection resolved and plt normal       History of PE  - on Eliquis since 2012  - per documentation from prior attending: \"appreciate discussion with PMD 3/13. The patient was seen by hematology in 2013 but seems was lost to follow up. I think at that time the plan was to stop anticoagulation if her SLE is stable. Spoke to Dr Radha Aranda, he feels that the patient can come off Eliquis. His records do not suggest any recurrence of DVT/PE.  Spoke to Dr Alex Mccormick, he has not seen the patient since >1 yr. If antiphospholipid antibodies negative then the patient can come off Eliquis\"  - 10/17 V/Q high probability for PE  - per Oncology the patient should be continued to Eliquis indefinitely       Hypophosphatemia:  -replete prn      ESRD  -on HD MWF   -RUE AVG was malfunctioning, s/p OR to fix  -Nephrology following  - Staples to be removed in in 5 days      Acute blood loss anemia on Anemia of chronic disease due recent surgical procedure  -Transfused 2 units PRBC with HD 3/15  - Transfuse 1U PRBC 4/20 for Hb of 6.9      Hepatic steatosis     Severe protein calorie malnutrition  -consult to nutrition     Constipation:  -bowel regimen      FOLLOW-UP TESTS recommended:   - Please make appointment with Surgery for drain removal  - Follow up at HD for T-Th-Sat     PENDING TEST RESULTS:  At the time of your discharge the following test results are still pending: none  Please make sure you review these results with your outpatient follow-up provider(s).     SYMPTOMS to watch for: chest pain, shortness of breath, fever, chills, nausea, vomiting, diarrhea, change in mentation, falling, weakness, bleeding.     DIET/what to eat: Renal diet     ACTIVITY:  Activity as tolerated     WOUND CARE: NONE     EQUIPMENT needed:  NONE    Physical examination at discharge  Visit Vitals    /90 (BP 1 Location: Left arm, BP Patient Position: At rest)    Pulse (!) 105    Temp 98 °F (36.7 °C)    Resp 20    Ht 5' 5\" (1.651 m)    Wt 73.2 kg (161 lb 6 oz)    SpO2 98%    Breastfeeding No    BMI 26.85 kg/m2     Ao3  PERRL  EOMI  Lung CTA  CVS: RRR  Abd: soft, midline tenderness, + JENNIFER in place    Pertinent imaging studies:  See radiology     Recent Labs      04/20/18   1107   WBC  6.3   HGB  6.9*   HCT  23.4*   PLT  279     No results for input(s): NA, K, CL, CO2, BUN, CREA, GLU, CA, MG, PHOS, URICA in the last 72 hours. No results for input(s): SGOT, GPT, AP, TBIL, TP, ALB, GLOB, GGT, AML, LPSE in the last 72 hours. No lab exists for component: AMYP, HLPSE  No results for input(s): INR, PTP, APTT in the last 72 hours.     No lab exists for component: INREXT   No results for input(s): FE, TIBC, PSAT, FERR in the last 72 hours. No results for input(s): PH, PCO2, PO2 in the last 72 hours. No results for input(s): CPK, CKMB in the last 72 hours.     No lab exists for component: TROPONINI  No components found for: [place]    Chronic Diagnoses:    Problem List as of 4/20/2018  Date Reviewed: 3/11/2018          Codes Class Noted - Resolved    Generalized abdominal pain ICD-10-CM: R10.84  ICD-9-CM: 789.07  4/4/2018 - Present        Other constipation ICD-10-CM: K59.09  ICD-9-CM: 564.09  4/4/2018 - Present        Other ascites ICD-10-CM: R18.8  ICD-9-CM: 789.59  4/4/2018 - Present        * (Principal)Peritonitis ([facility] ICD-10-CM: K65.9  ICD-9-CM: 567.9  3/11/2018 - Present        History of pulmonary embolism ICD-10-CM: S20.481  ICD-9-CM: V12.55  12/8/2017 - Present        Hypomagnesemia ICD-10-CM: E83.42  ICD-9-CM: 275.2  10/30/2017 - Present        Hypocalcemia ICD-10-CM: E83.51  ICD-9-CM: 275.41  10/30/2017 - Present        Hypokalemia ICD-10-CM: E87.6  ICD-9-CM: 276.8  10/27/2017 - Present        Hypertension (Chronic) ICD-10-CM: I10  ICD-9-CM: 401.9  10/14/2017 - Present        Chronic bilateral low back pain without sciatica ICD-10-CM: M54.5, G89.29  ICD-9-CM: 724.2, 338.29  5/26/2017 - Present        Anemia of renal disease ICD-10-CM: D63.1  ICD-9-CM: 285.21  2/21/2017 - Present        Dependence on peritoneal dialysis [facility]) ICD-10-CM: Z99.2  ICD-9-CM: V45.11  2/21/2017 - Present        ACP (advance care planning) ICD-10-CM: Z71.89  ICD-9-CM: V65.49  2/21/2017 - Present        ESRD on peritoneal dialysis [facility]) (Chronic) ICD-10-CM: N18.6, Z99.2  ICD-9-CM: 585.6, V45.11  10/7/2016 - Present        Malignant hypertension ICD-10-CM: I10  ICD-9-CM: 401.0  7/11/2016 - Present        Encounter for monitoring opioid maintenance therapy ICD-10-CM: Z51.81, Z79.891  ICD-9-CM: V58.83, V58.69  11/3/2015 - Present        Lupus nephritis ([facility] ICD-10-CM: M32.14  ICD-9-CM: 710.0, 583.81  3/10/2012 - Present        Lupus ICD-10-CM: L93.0  ICD-9-CM: 695.4  2/27/2012 - Present        RESOLVED: Sepsis ([facility] ICD-10-CM: A41.9  ICD-9-CM: 038.9, 995.91  12/12/2017 - 12/22/2017        RESOLVED: Pneumonia ICD-10-CM: J18.9  ICD-9-CM: 863  10/14/2017 - 12/8/2017        RESOLVED: Pleural effusion, left ICD-10-CM: J90  ICD-9-CM: 511.9  10/14/2017 - 12/8/2017        RESOLVED: Idiopathic chronic inflammatory bowel disease ICD-10-CM: I85.14  ICD-9-CM: 558.9  8/28/2013 - 8/28/2013        RESOLVED: Abdominal pain ICD-10-CM: R10.9  ICD-9-CM: 789.00  8/28/2013 - 9/3/2013        RESOLVED: Hypoxia ICD-10-CM: R09.02  ICD-9-CM: 799.02  4/25/2013 - 4/30/2013        RESOLVED: Lupus nephritis ([facility] ICD-10-CM: M32.14  ICD-9-CM: 710.0, 583.81  4/27/2012 - 4/28/2012              Time spent on discharge related activities today greater than 30 minutes.       Signed:  Terry Hicks MD                 Hospitalist, Internal Medicine      Cc: Martha Junior NP

## 2018-04-20 NOTE — PROGRESS NOTES
Surgery Progress Note    Admit Date: 3/11/2018      Subjective:      No complaints. Feeling better. Denies nausea or vomiting. Objective:     Patient Vitals for the past 8 hrs:   BP Temp Pulse Resp SpO2 Weight   04/20/18 0821 138/90 98 °F (36.7 °C) (!) 105 20 98 % -   04/20/18 0757 - - - - 99 % -   04/20/18 0714 - - - - - 161 lb 6 oz (73.2 kg)   04/20/18 0408 (!) 142/103 98.4 °F (36.9 °C) 97 20 99 % -     04/20 0701 - 04/20 1900  In: -   Out: 10 [Drains:10]  04/18 1901 - 04/20 0700  In: 320 [P.O.:200; I.V.:120]  Out: 1625 [Drains:125]ip  Physical Exam:    General: alert, cooperative, no distress  Cardiac: normal S1 and S2  Lungs: Normal chest wall and respirations. Clear to auscultation. Abdomen: minimal tenderness, JENNIFER drain with serous anginous output        CBC: Lab Results   Component Value Date/Time    WBC 6.3 04/13/2018 03:53 AM    RBC 2.90 (L) 04/13/2018 03:53 AM    HGB 8.0 (L) 04/13/2018 03:53 AM    HCT 27.2 (L) 04/13/2018 03:53 AM    PLATELET 220 05/90/6819 03:53 AM     BMP: Lab Results   Component Value Date/Time    Glucose 101 (H) 04/16/2018 02:30 PM    Sodium 144 04/16/2018 02:30 PM    Potassium 4.7 04/16/2018 02:30 PM    Chloride 110 (H) 04/16/2018 02:30 PM    CO2 26 04/16/2018 02:30 PM    BUN 19 04/16/2018 02:30 PM    Creatinine 5.61 (H) 04/16/2018 02:30 PM    Calcium 7.4 (L) 04/16/2018 02:30 PM     CMP:  Lab Results   Component Value Date/Time    Glucose 101 (H) 04/16/2018 02:30 PM    Sodium 144 04/16/2018 02:30 PM    Potassium 4.7 04/16/2018 02:30 PM    Chloride 110 (H) 04/16/2018 02:30 PM    CO2 26 04/16/2018 02:30 PM    BUN 19 04/16/2018 02:30 PM    Creatinine 5.61 (H) 04/16/2018 02:30 PM    Calcium 7.4 (L) 04/16/2018 02:30 PM    Anion gap 8 04/16/2018 02:30 PM    BUN/Creatinine ratio 3 (L) 04/16/2018 02:30 PM    Alk.  phosphatase 81 04/16/2018 02:30 PM    Protein, total 5.1 (L) 04/16/2018 02:30 PM    Albumin 1.1 (L) 04/16/2018 02:30 PM    Globulin 4.0 04/16/2018 02:30 PM    A-G Ratio 0.3 (L) 04/16/2018 02:30 PM               Assessment:   Pt is POD #9 s/p Procedure(s):  DIAGNOSTIC LAPAROSCOPY WITH LAPAROSCOPIC ABDOMINAL [address] OUT       Plan:   Continue antibiotics  Continue left drain due output  Further plan per Dr. Laurie Freeman, covering for Dr. Ana Laura Bautista.    Woodrow Gonzalez, NP

## 2018-04-20 NOTE — PROGRESS NOTES
Progress Note    Patient: Alex Hemphill MRN: [id number]  SSN: [id number]    YOB: 1985  Age: 29 y.o. Sex: female      Admit Date: 3/11/2018    8 Days Post-Op    Procedure:  Procedure(s):  DIAGNOSTIC LAPAROSCOPY WITH LAPAROSCOPIC ABDOMINAL [address] OUT     Subjective:     Patient had right drain removed  Feeling a little better. .     Objective:     Visit Vitals    BP (!) 131/98 (BP 1 Location: Left arm, BP Patient Position: At rest;Head of bed elevated (Comment degrees))    Pulse (!) 102    Temp 98.6 °F (37 °C)    Resp 20    Ht 5' 5\" (1.651 m)    Wt 158 lb (71.7 kg)    SpO2 98%    Breastfeeding No    BMI 26.29 kg/m2       Temp (24hrs), Av.6 °F (37 °C), Min:98.2 °F (36.8 °C), Max:99 °F (37.2 °C)      Physical Exam:    Gen- Alert in NAD  ABd- S/minimal tendereness    Data Review: images and reports reviewed    Lab Review:   Recent Results (from the past 24 hour(s))   GLUCOSE, POC    Collection Time: 18  6:22 AM   Result Value Ref Range    Glucose (POC) 92 65 - 100 mg/dL    Performed by [address]rez, POC    Collection Time: 18 11:41 AM   Result Value Ref Range    Glucose (POC) 89 65 - 100 mg/dL    Performed by [facility], POC    Collection Time: 18  4:30 PM   Result Value Ref Range    Glucose (POC) 86 65 - 100 mg/dL    Performed by Wang Ceron    GLUCOSE, POC    Collection Time: 18  9:11 PM   Result Value Ref Range    Glucose (POC) 119 (H) 65 - 100 mg/dL    Performed by Ankit Villar            Assessment:     Hospital Problems  Date Reviewed: 3/11/2018          Codes Class Noted POA    Generalized abdominal pain ICD-10-CM: R10.84  ICD-9-CM: 789.07  2018 Unknown        Other constipation ICD-10-CM: K59.09  ICD-9-CM: 564.09  2018 Unknown        Other ascites ICD-10-CM: R18.8  ICD-9-CM: 789.59  2018 Unknown        * (Principal)Peritonitis ([facility] ICD-10-CM: K65.9  ICD-9-CM: 567.9  3/11/2018 Yes Plan/Recommendations/Medical Decision Making:   S/p Lap drainage of abscess   Continue Abx  Doing well. Continue Left drain as it is still draining.    Signed By: Marylee Sell, MD     April 20, 2018

## 2018-04-21 LAB
ABO + RH BLD: NORMAL
BLD PROD TYP BPU: NORMAL
BLOOD GROUP ANTIBODIES SERPL: NORMAL
BPU ID: NORMAL
CROSSMATCH RESULT,%XM: NORMAL
SPECIMEN EXP DATE BLD: NORMAL
STATUS OF UNIT,%ST: NORMAL
UNIT DIVISION, %UDIV: 0

## 2018-04-23 ENCOUNTER — PATIENT OUTREACH (OUTPATIENT)
Dept: FAMILY MEDICINE CLINIC | Age: 33
End: 2018-04-23

## 2018-04-23 ENCOUNTER — HOME CARE VISIT (OUTPATIENT)
Dept: SCHEDULING | Facility: HOME HEALTH | Age: 33
End: 2018-04-23
Payer: MEDICARE

## 2018-04-23 LAB
BACTERIA SPEC CULT: NORMAL
GRAM STN SPEC: NORMAL
GRAM STN SPEC: NORMAL
SERVICE CMNT-IMP: NORMAL

## 2018-04-23 PROCEDURE — 3331090002 HH PPS REVENUE DEBIT

## 2018-04-23 PROCEDURE — G0299 HHS/HOSPICE OF RN EA 15 MIN: HCPCS

## 2018-04-23 PROCEDURE — 400013 HH SOC

## 2018-04-23 PROCEDURE — 3331090001 HH PPS REVENUE CREDIT

## 2018-04-23 NOTE — PROGRESS NOTES
.  Hospital Discharge Follow-Up      Date/Time:  2018 9:59 AM    Patient was admitted to [facility] on 3/11/18 and discharged on 18 for peritonitis. The physician discharge summary was available at the time of outreach. Patient was contacted within 1 business days of discharge. Top Challenges reviewed with the provider   -discharged home with left JENNIFER drain in place, requiring drain care at home  -continued bilateral edema of lower extremities- to follow-up with HD for dialysis on T-TH-Sat- per oncology patient should be continued on Eliquis indefinitely  - patient has staples at graph site-to be removed in 5 days  -consider re-checking labs- history of anemia, -patient was transfused on 18 for HGB of 6.9. Method of communication with provider :face-face, chart routing, staff messaging  Inpatient RRAT score: High  Was this a readmission? no   Patient stated reason for the readmission: Abdominal pain    Nurse Navigator (NN) contacted the patient by telephone to perform post hospital discharge assessment. Verified name and  with patient as identifiers. Provided introduction to self, and explanation of the Nurse Navigator role. Reviewed discharge instructions and red flags with patient who verbalized understanding. Patient given an opportunity to ask questions and does not have any further questions or concerns at this time. The patient agrees to contact the PCP office for questions related to their healthcare. NN provided contact information for future reference. Summary of patients top problems:  1. Generalized weakness R/t long hospital stay and limited activity, [facility] for PT/OT, using walker  2. Home with JENNIFER drain in place at left abdomen and sutures at site of graph, [facility] to evaluate for care, R/T acute peritonitis and AVG placement, history of with sepsis  3.  End-Stage renal disease- hemo-dialysis on T-TH-Sat, appointment need to be scheduled around treatments, Appointment for 4/24/18 canceled, patient will be in dialysis at 2:00 pm. Dispatch to see 4/23/18, PCp re-scheduled for 5/2/18    Home Health orders at discharge: [facility] evaluate and treat  [address]: [facility]  Date of initial visit: NN spoke with Luisa at [facility] and patient is scheduled foe initial visit tonight at 5:30 PM    [address] [employer] [place] ordered/company: need walker, using some else's walker, will talk to PCP at next follow-up visit. Durable Medical Equipment received: Using a loaner walker    Barriers to care? Patient says she still have some pain, has follow-up with pain management specialist scheduled  in few weeks. Advance Care Planning:   Does patient have an Advance Directive:  not on file, NN spoke with patient who says she will discuss with mother, and call for appointment with facilitator. Verbalized understanding of the process. Medication:     New Medications at Discharge: Norvasc, Dilaudid, and Miralax  Changed Medications at Discharge: none  Discontinued Medications at Discharge: Roxicodone    Medication reconciliation was performed with patient, who verbalizes understanding of administration of home medications. There were no barriers to obtaining medications identified at this time. Referral to Pharm D needed: no     Current Outpatient Prescriptions   Medication Sig    amLODIPine (NORVASC) 5 mg tablet Take 1 Tab by mouth daily.  HYDROmorphone (DILAUDID) 4 mg tablet Take 1 Tab by mouth every four (4) hours as needed. Max Daily Amount: 24 mg.  polyethylene glycol (MIRALAX) 17 gram packet Take 1 Packet by mouth daily.  amitriptyline (ELAVIL) 25 mg tablet Take 1 Tab by mouth nightly. PRN    predniSONE (DELTASONE) 5 mg tablet Take 5 mg by mouth daily.  fluticasone-vilanterol (BREO ELLIPTA) 200-25 mcg/dose inhaler Take 1 Puff by inhalation daily. Rinse mouth out after use    gemfibrozil (LOPID) 600 mg tablet Take 300 mg by mouth daily.     carvedilol (COREG) 6.25 mg tablet Take 12.5 mg by mouth two (2) times daily (with meals).  apixaban (ELIQUIS) 5 mg tablet Take 5 mg by mouth every twelve (12) hours.  azaTHIOprine (IMURAN) 50 mg tablet Take 50 mg by mouth daily (after breakfast).  albuterol (PROVENTIL HFA, VENTOLIN HFA, PROAIR HFA) 90 mcg/actuation inhaler Take 1-2 Puffs by inhalation every four (4) hours as needed for Wheezing or Shortness of Breath.  hydroxychloroquine (PLAQUENIL) 200 mg tablet Take 200 mg by mouth two (2) times a day.  allopurinol (ZYLOPRIM) 100 mg tablet Take 100 mg by mouth daily (after breakfast). Needs to TAKE on a full stomach; will cause her to be nauseated.  cyanocobalamin (VITAMIN B-12) 2,500 mcg sublingual tablet Take 1 Tab by mouth daily.  pantoprazole (PROTONIX) 40 mg tablet Take 1 Tab by mouth Daily (before breakfast).  senna (SENNA) 8.6 mg tablet Take 1 Tab by mouth daily as needed. No current facility-administered medications for this visit. There are no discontinued medications. PCP/Specialist follow up:   Future Appointments  Date Time Provider Rocio Hardy   5/2/2018 11:45 AM [facility]          Goals        Patient [facility] (pt-stated)            10/19/17- NN did not discuss this with patient. Will plan to discuss with patient during upcoming call. Sc  4/23/18- NN encouraged patient to complete when she comes for her next follow-up visit. Patient verbalized understanding and will schedule. pk       Patient/Family verbalizes understanding of self-management of  disease. (pt-stated)            11/29/17- Patient verbalized understanding of hypokalemia and treatment plan was review with patient PK    How can you care for yourself at home? · If your doctor recommends it, eat foods that have a lot of potassium. These include fresh fruits, juices, and vegetables. They also include nuts, beans, and milk. · Be safe with medicines.  If your doctor prescribes medicines or potassium supplements, take them exactly as directed. Call your doctor if you have any problems with your medicines. · Get your potassium levels tested as often as your doctor tells you.  Prevention of infection (pt-stated)            10/19/17- patient will assess/closely monitor her temperatures  and report fevers greater than 101. Patient will assess hydration status by consuming at least six cups of water to avoid dehydration. Patient will take Levaquin as prescribed. sc  10/31/17- NN instructed patient on importance of taking all medications as ordered, follow-ups as scheduled- PK    Call your doctor now or seek immediate medical care if:  ? · You cough up dark brown or bloody mucus (sputum). ? · You have new or worse trouble breathing. ? · You are dizzy or lightheaded, or you feel like you may faint. ? Watch closely for changes in your health, and be sure to contact your doctor if:  ? · You have a new or higher fever. ? · You are coughing more deeply or more often. ? · You are not getting better after 2 days (48 hours). ? · You do not get better as expected. 4/23/18- Discharge instructions, appointments and medications reviewed. Patient verbalized understanding.  pk

## 2018-04-23 NOTE — PATIENT INSTRUCTIONS
Learning About Peritonitis  What is peritonitis? Peritonitis is an infection of the lining of the belly (peritoneum). It causes pain and swelling inside the belly. It may also cause a fever. It can be the result of medical problems in the belly, such as a burst appendix. It can also be the result of surgery or an injury in the belly. This can lead to an infection that is caused by bacteria. This infection is serious. It needs medical care right away. If the illness is not treated, it gets worse fast and can become life-threatening. What are the symptoms? Symptoms of peritonitis include:  · Swelling of the belly, which may feel hard (rigid). · Severe pain and tenderness in the belly that gets worse when you move, cough, or press on the belly. The pain sometimes reaches into the shoulder. · Nausea and vomiting. · Diarrhea. · A rapid pulse. · Chills and fever. · Rapid breathing. · Confusion, memory loss, or feeling less alert. How is it treated? You may have a CT (computed tomography) scan or other imaging test. This can help find out if there is damage to an organ in the belly. If so, then you will likely need surgery to repair the damage. If your symptoms are severe, you may need surgery right away. Peritonitis will also be treated with antibiotics. If there is fluid in your belly, your doctor may do a procedure called paracentesis (say \"xddf-ej-qhk-EARNEST-annalise\") to get a sample of the fluid. The fluid can be tested to help your doctor decide which antibiotics will work best to treat the infection. The test can also help find the cause of your symptoms. Follow-up care is a key part of your treatment and safety. Be sure to make and go to all appointments, and call your doctor if you are having problems. It's also a good idea to know your test results and keep a list of the medicines you take. Where can you learn more? Go to http://jorge-rochelle.info/.   Enter O861 in the search box to learn more about \"Learning About Peritonitis. \"  Current as of: May 12, 2017  Content Version: 11.4  © 9375-6679 [employer]. Care instructions adapted under license by [employer] (which disclaims liability or warranty for this information). If you have questions about a medical condition or this instruction, always ask your healthcare professional. [address] any warranty or liability for your use of this information. Learning About Spontaneous Bacterial Peritonitis  What is spontaneous bacterial peritonitis? Spontaneous bacterial peritonitis (SBP) is an infection of fluid that builds up in the belly. It causes pain and swelling inside the belly. It may also cause a fever. This infection most often occurs when a person has had cirrhosis for a long time. It can also occur in people with other conditions that cause fluid to build up in the belly. These can include kidney failure, heart failure, and some cancers. Sometimes the fluid builds up without a clear cause. This infection is serious. Even if your symptoms are mild, you need medical care right away. If it is not treated, the illness gets worse fast and can be life-threatening. But most people who are treated with antibiotics get better. What are the symptoms? Symptoms of SBP include:  · Swelling of the belly, which may feel hard (rigid). · Severe pain and tenderness in the belly. · Nausea and vomiting. · Diarrhea. · A fever. · Confusion, memory loss, or feeling less alert. How is it treated? The infection is usually treated with antibiotics. Your doctor may do a procedure called paracentesis (say \"ivbn-vn-qbh-EARNEST-annalise\") to get a sample of fluid from your belly. The fluid can be tested to help find the cause of your symptoms. It can also help your doctor decide which medicines will work best to treat the infection. Follow-up care is a key part of your treatment and safety.  Be sure to make and go to all appointments, and call your doctor if you are having problems. It's also a good idea to know your test results and keep a list of the medicines you take. Where can you learn more? Go to http://jorge-rochelle.info/. Enter L747 in the search box to learn more about \"Learning About Spontaneous Bacterial Peritonitis. \"  Current as of: May 12, 2017  Content Version: 11.4  © 4884-8696 [employer]. Care instructions adapted under license by [employer] (which disclaims liability or warranty for this information). If you have questions about a medical condition or this instruction, always ask your healthcare professional. [address] any warranty or liability for your use of this information.

## 2018-04-24 ENCOUNTER — HOME CARE VISIT (OUTPATIENT)
Dept: SCHEDULING | Facility: HOME HEALTH | Age: 33
End: 2018-04-24
Payer: MEDICARE

## 2018-04-24 VITALS
DIASTOLIC BLOOD PRESSURE: 96 MMHG | RESPIRATION RATE: 18 BRPM | SYSTOLIC BLOOD PRESSURE: 152 MMHG | BODY MASS INDEX: 26.59 KG/M2 | WEIGHT: 159.6 LBS | HEART RATE: 88 BPM | HEIGHT: 65 IN | OXYGEN SATURATION: 99 % | TEMPERATURE: 97.2 F

## 2018-04-24 VITALS
DIASTOLIC BLOOD PRESSURE: 90 MMHG | RESPIRATION RATE: 16 BRPM | TEMPERATURE: 98.4 F | SYSTOLIC BLOOD PRESSURE: 150 MMHG | HEART RATE: 60 BPM

## 2018-04-24 PROCEDURE — 3331090002 HH PPS REVENUE DEBIT

## 2018-04-24 PROCEDURE — G0152 HHCP-SERV OF OT,EA 15 MIN: HCPCS

## 2018-04-24 PROCEDURE — A4216 STERILE WATER/SALINE, 10 ML: HCPCS

## 2018-04-24 PROCEDURE — 3331090001 HH PPS REVENUE CREDIT

## 2018-04-24 PROCEDURE — A6402 STERILE GAUZE <= 16 SQ IN: HCPCS

## 2018-04-24 PROCEDURE — A4450 NON-WATERPROOF TAPE: HCPCS

## 2018-04-24 PROCEDURE — A4927 NON-STERILE GLOVES: HCPCS

## 2018-04-25 ENCOUNTER — PATIENT OUTREACH (OUTPATIENT)
Dept: FAMILY MEDICINE CLINIC | Age: 33
End: 2018-04-25

## 2018-04-25 PROCEDURE — 3331090001 HH PPS REVENUE CREDIT

## 2018-04-25 PROCEDURE — 3331090002 HH PPS REVENUE DEBIT

## 2018-04-26 PROCEDURE — 3331090002 HH PPS REVENUE DEBIT

## 2018-04-26 PROCEDURE — 3331090001 HH PPS REVENUE CREDIT

## 2018-04-27 ENCOUNTER — HOME CARE VISIT (OUTPATIENT)
Dept: SCHEDULING | Facility: HOME HEALTH | Age: 33
End: 2018-04-27
Payer: MEDICARE

## 2018-04-27 ENCOUNTER — TELEPHONE (OUTPATIENT)
Dept: FAMILY MEDICINE CLINIC | Age: 33
End: 2018-04-27

## 2018-04-27 ENCOUNTER — HOME CARE VISIT (OUTPATIENT)
Dept: HOME HEALTH SERVICES | Facility: HOME HEALTH | Age: 33
End: 2018-04-27
Payer: MEDICARE

## 2018-04-27 VITALS
DIASTOLIC BLOOD PRESSURE: 78 MMHG | OXYGEN SATURATION: 98 % | SYSTOLIC BLOOD PRESSURE: 143 MMHG | TEMPERATURE: 98 F | RESPIRATION RATE: 17 BRPM | HEART RATE: 70 BPM

## 2018-04-27 VITALS
RESPIRATION RATE: 16 BRPM | SYSTOLIC BLOOD PRESSURE: 160 MMHG | OXYGEN SATURATION: 97 % | DIASTOLIC BLOOD PRESSURE: 110 MMHG | HEART RATE: 108 BPM | TEMPERATURE: 97.8 F

## 2018-04-27 PROCEDURE — G0152 HHCP-SERV OF OT,EA 15 MIN: HCPCS

## 2018-04-27 PROCEDURE — 3331090001 HH PPS REVENUE CREDIT

## 2018-04-27 PROCEDURE — G0151 HHCP-SERV OF PT,EA 15 MIN: HCPCS

## 2018-04-27 PROCEDURE — 3331090002 HH PPS REVENUE DEBIT

## 2018-04-27 NOTE — TELEPHONE ENCOUNTER
Hussein Lew is calling regarding the patient high blood pressure, She is asymptomatic and her BP was 160/111 today.   Hussein Lew wants the Dr to know and is requesting a call back from nurse  Best call back :  [phone]  Last seen : December 08, 2017

## 2018-04-27 NOTE — TELEPHONE ENCOUNTER
[phone] spoke to [facility] notified patient needs office visit per [facility] patient has appointment 5/2/2018 and I advised [facility] that if patients BP gets worse over the weekend to go to Urgent Care/ER per [facility] she did advised patient if BP gets worse over the weekend to go to Urgent Care/ER

## 2018-04-28 PROCEDURE — 3331090002 HH PPS REVENUE DEBIT

## 2018-04-28 PROCEDURE — 3331090001 HH PPS REVENUE CREDIT

## 2018-04-29 ENCOUNTER — HOSPITAL ENCOUNTER (INPATIENT)
Age: 33
LOS: 1 days | Discharge: HOME | DRG: 871 | End: 2018-04-30
Attending: EMERGENCY MEDICINE | Admitting: HOSPITALIST
Payer: MEDICARE

## 2018-04-29 ENCOUNTER — APPOINTMENT (OUTPATIENT)
Dept: GENERAL RADIOLOGY | Age: 33
DRG: 871 | End: 2018-04-29
Attending: EMERGENCY MEDICINE
Payer: MEDICARE

## 2018-04-29 ENCOUNTER — APPOINTMENT (OUTPATIENT)
Dept: CT IMAGING | Age: 33
DRG: 871 | End: 2018-04-29
Attending: HOSPITALIST
Payer: MEDICARE

## 2018-04-29 ENCOUNTER — APPOINTMENT (OUTPATIENT)
Dept: CT IMAGING | Age: 33
DRG: 871 | End: 2018-04-29
Attending: EMERGENCY MEDICINE
Payer: MEDICARE

## 2018-04-29 DIAGNOSIS — R06.02 SOB (SHORTNESS OF BREATH): Primary | ICD-10-CM

## 2018-04-29 DIAGNOSIS — K65.9 PERITONITIS (HCC): ICD-10-CM

## 2018-04-29 DIAGNOSIS — R91.8 PULMONARY INFILTRATES: ICD-10-CM

## 2018-04-29 DIAGNOSIS — R03.0 ELEVATED BLOOD PRESSURE READING: ICD-10-CM

## 2018-04-29 PROBLEM — A41.9 SEPSIS (HCC): Status: ACTIVE | Noted: 2018-04-29

## 2018-04-29 LAB
ALBUMIN SERPL-MCNC: 1.6 G/DL (ref 3.5–5)
ALBUMIN/GLOB SERPL: 0.3 {RATIO} (ref 1.1–2.2)
ALP SERPL-CCNC: 305 U/L (ref 45–117)
ALT SERPL-CCNC: 26 U/L (ref 12–78)
ANION GAP SERPL CALC-SCNC: 8 MMOL/L (ref 5–15)
AST SERPL-CCNC: 48 U/L (ref 15–37)
ATRIAL RATE: 122 BPM
BASOPHILS # BLD: 0 K/UL (ref 0–0.1)
BASOPHILS NFR BLD: 1 % (ref 0–1)
BILIRUB SERPL-MCNC: 0.3 MG/DL (ref 0.2–1)
BUN SERPL-MCNC: 25 MG/DL (ref 6–20)
BUN/CREAT SERPL: 6 (ref 12–20)
CALCIUM SERPL-MCNC: 8.1 MG/DL (ref 8.5–10.1)
CALCULATED P AXIS, ECG09: 80 DEGREES
CALCULATED R AXIS, ECG10: 39 DEGREES
CALCULATED T AXIS, ECG11: 85 DEGREES
CHLORIDE SERPL-SCNC: 102 MMOL/L (ref 97–108)
CO2 SERPL-SCNC: 31 MMOL/L (ref 21–32)
CREAT SERPL-MCNC: 4.01 MG/DL (ref 0.55–1.02)
DIAGNOSIS, 93000: NORMAL
DIFFERENTIAL METHOD BLD: ABNORMAL
EOSINOPHIL # BLD: 0.1 K/UL (ref 0–0.4)
EOSINOPHIL NFR BLD: 3 % (ref 0–7)
ERYTHROCYTE [DISTWIDTH] IN BLOOD BY AUTOMATED COUNT: 17.9 % (ref 11.5–14.5)
GLOBULIN SER CALC-MCNC: 5.2 G/DL (ref 2–4)
GLUCOSE SERPL-MCNC: 81 MG/DL (ref 65–100)
HBV SURFACE AG SER QL: 0.22 INDEX
HBV SURFACE AG SER QL: NEGATIVE
HCT VFR BLD AUTO: 30.7 % (ref 35–47)
HGB BLD-MCNC: 9 G/DL (ref 11.5–16)
IMM GRANULOCYTES # BLD: 0 K/UL (ref 0–0.04)
IMM GRANULOCYTES NFR BLD AUTO: 0 % (ref 0–0.5)
LACTATE BLD-SCNC: 1.3 MMOL/L (ref 0.4–2)
LYMPHOCYTES # BLD: 0.8 K/UL (ref 0.8–3.5)
LYMPHOCYTES NFR BLD: 16 % (ref 12–49)
MCH RBC QN AUTO: 28.5 PG (ref 26–34)
MCHC RBC AUTO-ENTMCNC: 29.3 G/DL (ref 30–36.5)
MCV RBC AUTO: 97.2 FL (ref 80–99)
MONOCYTES # BLD: 0.2 K/UL (ref 0–1)
MONOCYTES NFR BLD: 4 % (ref 5–13)
NEUTS SEG # BLD: 3.7 K/UL (ref 1.8–8)
NEUTS SEG NFR BLD: 76 % (ref 32–75)
NRBC # BLD: 0 K/UL (ref 0–0.01)
NRBC BLD-RTO: 0 PER 100 WBC
P-R INTERVAL, ECG05: 94 MS
PLATELET # BLD AUTO: 226 K/UL (ref 150–400)
PMV BLD AUTO: 10.2 FL (ref 8.9–12.9)
POTASSIUM SERPL-SCNC: 4.4 MMOL/L (ref 3.5–5.1)
PROT SERPL-MCNC: 6.8 G/DL (ref 6.4–8.2)
Q-T INTERVAL, ECG07: 326 MS
QRS DURATION, ECG06: 68 MS
QTC CALCULATION (BEZET), ECG08: 464 MS
RBC # BLD AUTO: 3.16 M/UL (ref 3.8–5.2)
RBC MORPH BLD: ABNORMAL
RBC MORPH BLD: ABNORMAL
SODIUM SERPL-SCNC: 141 MMOL/L (ref 136–145)
TROPONIN I SERPL-MCNC: <0.04 NG/ML
VENTRICULAR RATE, ECG03: 122 BPM
WBC # BLD AUTO: 4.8 K/UL (ref 3.6–11)

## 2018-04-29 PROCEDURE — 71275 CT ANGIOGRAPHY CHEST: CPT

## 2018-04-29 PROCEDURE — 74011250637 HC RX REV CODE- 250/637: Performed by: EMERGENCY MEDICINE

## 2018-04-29 PROCEDURE — 93005 ELECTROCARDIOGRAM TRACING: CPT

## 2018-04-29 PROCEDURE — 87040 BLOOD CULTURE FOR BACTERIA: CPT | Performed by: EMERGENCY MEDICINE

## 2018-04-29 PROCEDURE — 90935 HEMODIALYSIS ONE EVALUATION: CPT

## 2018-04-29 PROCEDURE — 74011000250 HC RX REV CODE- 250: Performed by: HOSPITALIST

## 2018-04-29 PROCEDURE — 87340 HEPATITIS B SURFACE AG IA: CPT | Performed by: INTERNAL MEDICINE

## 2018-04-29 PROCEDURE — 71046 X-RAY EXAM CHEST 2 VIEWS: CPT

## 2018-04-29 PROCEDURE — 87077 CULTURE AEROBIC IDENTIFY: CPT | Performed by: HOSPITALIST

## 2018-04-29 PROCEDURE — 80053 COMPREHEN METABOLIC PANEL: CPT | Performed by: EMERGENCY MEDICINE

## 2018-04-29 PROCEDURE — 94664 DEMO&/EVAL PT USE INHALER: CPT

## 2018-04-29 PROCEDURE — 5A1D70Z PERFORMANCE OF URINARY FILTRATION, INTERMITTENT, LESS THAN 6 HOURS PER DAY: ICD-10-PCS | Performed by: HOSPITALIST

## 2018-04-29 PROCEDURE — 3331090001 HH PPS REVENUE CREDIT

## 2018-04-29 PROCEDURE — 94640 AIRWAY INHALATION TREATMENT: CPT

## 2018-04-29 PROCEDURE — 74011250637 HC RX REV CODE- 250/637: Performed by: HOSPITALIST

## 2018-04-29 PROCEDURE — 77030029684 HC NEB SM VOL KT MONA -A

## 2018-04-29 PROCEDURE — 74011250636 HC RX REV CODE- 250/636: Performed by: HOSPITALIST

## 2018-04-29 PROCEDURE — 87186 SC STD MICRODIL/AGAR DIL: CPT | Performed by: HOSPITALIST

## 2018-04-29 PROCEDURE — 3331090002 HH PPS REVENUE DEBIT

## 2018-04-29 PROCEDURE — 74011250636 HC RX REV CODE- 250/636: Performed by: EMERGENCY MEDICINE

## 2018-04-29 PROCEDURE — 83605 ASSAY OF LACTIC ACID: CPT

## 2018-04-29 PROCEDURE — 85025 COMPLETE CBC W/AUTO DIFF WBC: CPT | Performed by: EMERGENCY MEDICINE

## 2018-04-29 PROCEDURE — 65660000000 HC RM CCU STEPDOWN

## 2018-04-29 PROCEDURE — 74011636320 HC RX REV CODE- 636/320: Performed by: EMERGENCY MEDICINE

## 2018-04-29 PROCEDURE — 84484 ASSAY OF TROPONIN QUANT: CPT | Performed by: EMERGENCY MEDICINE

## 2018-04-29 PROCEDURE — 36415 COLL VENOUS BLD VENIPUNCTURE: CPT | Performed by: EMERGENCY MEDICINE

## 2018-04-29 PROCEDURE — 74011000258 HC RX REV CODE- 258: Performed by: EMERGENCY MEDICINE

## 2018-04-29 PROCEDURE — 99285 EMERGENCY DEPT VISIT HI MDM: CPT

## 2018-04-29 PROCEDURE — 87070 CULTURE OTHR SPECIMN AEROBIC: CPT | Performed by: HOSPITALIST

## 2018-04-29 RX ORDER — VANCOMYCIN 2 GRAM/500 ML IN 0.9 % SODIUM CHLORIDE INTRAVENOUS
2000
Status: COMPLETED | OUTPATIENT
Start: 2018-04-29 | End: 2018-04-29

## 2018-04-29 RX ORDER — UREA 10 %
2500 LOTION (ML) TOPICAL DAILY
Status: DISCONTINUED | OUTPATIENT
Start: 2018-04-30 | End: 2018-04-30 | Stop reason: HOSPADM

## 2018-04-29 RX ORDER — LEVOFLOXACIN 5 MG/ML
250 INJECTION, SOLUTION INTRAVENOUS ONCE
Status: COMPLETED | OUTPATIENT
Start: 2018-04-29 | End: 2018-04-29

## 2018-04-29 RX ORDER — SENNOSIDES 8.6 MG/1
1 TABLET ORAL
Status: DISCONTINUED | OUTPATIENT
Start: 2018-04-29 | End: 2018-04-30 | Stop reason: HOSPADM

## 2018-04-29 RX ORDER — CARVEDILOL 12.5 MG/1
12.5 TABLET ORAL 2 TIMES DAILY WITH MEALS
Status: DISCONTINUED | OUTPATIENT
Start: 2018-04-29 | End: 2018-04-30 | Stop reason: HOSPADM

## 2018-04-29 RX ORDER — SODIUM CHLORIDE 0.9 % (FLUSH) 0.9 %
5-10 SYRINGE (ML) INJECTION EVERY 8 HOURS
Status: DISCONTINUED | OUTPATIENT
Start: 2018-04-29 | End: 2018-04-30 | Stop reason: HOSPADM

## 2018-04-29 RX ORDER — BUDESONIDE 0.5 MG/2ML
500 INHALANT ORAL
Status: DISCONTINUED | OUTPATIENT
Start: 2018-04-29 | End: 2018-04-30 | Stop reason: HOSPADM

## 2018-04-29 RX ORDER — VANCOMYCIN/0.9 % SOD CHLORIDE 1.5G/250ML
1500 PLASTIC BAG, INJECTION (ML) INTRAVENOUS
Status: DISCONTINUED | OUTPATIENT
Start: 2018-04-29 | End: 2018-04-29

## 2018-04-29 RX ORDER — HYDRALAZINE HYDROCHLORIDE 20 MG/ML
20 INJECTION INTRAMUSCULAR; INTRAVENOUS
Status: DISCONTINUED | OUTPATIENT
Start: 2018-04-29 | End: 2018-04-30 | Stop reason: HOSPADM

## 2018-04-29 RX ORDER — ALBUTEROL SULFATE 0.83 MG/ML
2.5 SOLUTION RESPIRATORY (INHALATION)
Status: DISCONTINUED | OUTPATIENT
Start: 2018-04-29 | End: 2018-04-30 | Stop reason: HOSPADM

## 2018-04-29 RX ORDER — AZATHIOPRINE 50 MG/1
50 TABLET ORAL
Status: DISCONTINUED | OUTPATIENT
Start: 2018-04-30 | End: 2018-04-30 | Stop reason: HOSPADM

## 2018-04-29 RX ORDER — ACETAMINOPHEN 325 MG/1
650 TABLET ORAL
Status: DISCONTINUED | OUTPATIENT
Start: 2018-04-29 | End: 2018-04-30 | Stop reason: HOSPADM

## 2018-04-29 RX ORDER — PANTOPRAZOLE SODIUM 40 MG/1
40 TABLET, DELAYED RELEASE ORAL
Status: DISCONTINUED | OUTPATIENT
Start: 2018-04-30 | End: 2018-04-30 | Stop reason: HOSPADM

## 2018-04-29 RX ORDER — POLYETHYLENE GLYCOL 3350 17 G/17G
17 POWDER, FOR SOLUTION ORAL DAILY
Status: DISCONTINUED | OUTPATIENT
Start: 2018-04-30 | End: 2018-04-30 | Stop reason: HOSPADM

## 2018-04-29 RX ORDER — ARFORMOTEROL TARTRATE 15 UG/2ML
15 SOLUTION RESPIRATORY (INHALATION)
Status: DISCONTINUED | OUTPATIENT
Start: 2018-04-29 | End: 2018-04-30 | Stop reason: HOSPADM

## 2018-04-29 RX ORDER — ALLOPURINOL 100 MG/1
100 TABLET ORAL
Status: DISCONTINUED | OUTPATIENT
Start: 2018-04-30 | End: 2018-04-30 | Stop reason: HOSPADM

## 2018-04-29 RX ORDER — PREDNISONE 5 MG/1
5 TABLET ORAL DAILY
Status: DISCONTINUED | OUTPATIENT
Start: 2018-04-30 | End: 2018-04-30 | Stop reason: HOSPADM

## 2018-04-29 RX ORDER — HYDROXYCHLOROQUINE SULFATE 200 MG/1
200 TABLET, FILM COATED ORAL 2 TIMES DAILY
Status: DISCONTINUED | OUTPATIENT
Start: 2018-04-29 | End: 2018-04-30 | Stop reason: HOSPADM

## 2018-04-29 RX ORDER — GEMFIBROZIL 600 MG/1
300 TABLET, FILM COATED ORAL DAILY
Status: DISCONTINUED | OUTPATIENT
Start: 2018-04-30 | End: 2018-04-30 | Stop reason: HOSPADM

## 2018-04-29 RX ORDER — LEVOFLOXACIN 5 MG/ML
500 INJECTION, SOLUTION INTRAVENOUS
Status: COMPLETED | OUTPATIENT
Start: 2018-04-29 | End: 2018-04-29

## 2018-04-29 RX ORDER — AMLODIPINE BESYLATE 5 MG/1
10 TABLET ORAL
Status: COMPLETED | OUTPATIENT
Start: 2018-04-29 | End: 2018-04-29

## 2018-04-29 RX ORDER — SODIUM CHLORIDE 0.9 % (FLUSH) 0.9 %
10 SYRINGE (ML) INJECTION
Status: ACTIVE | OUTPATIENT
Start: 2018-04-29 | End: 2018-04-29

## 2018-04-29 RX ORDER — AMLODIPINE BESYLATE 5 MG/1
10 TABLET ORAL DAILY
Status: DISCONTINUED | OUTPATIENT
Start: 2018-04-30 | End: 2018-04-30 | Stop reason: HOSPADM

## 2018-04-29 RX ORDER — SODIUM CHLORIDE 0.9 % (FLUSH) 0.9 %
5-10 SYRINGE (ML) INJECTION AS NEEDED
Status: DISCONTINUED | OUTPATIENT
Start: 2018-04-29 | End: 2018-04-30 | Stop reason: HOSPADM

## 2018-04-29 RX ORDER — SODIUM CHLORIDE 0.9 % (FLUSH) 0.9 %
10 SYRINGE (ML) INJECTION
Status: COMPLETED | OUTPATIENT
Start: 2018-04-29 | End: 2018-04-29

## 2018-04-29 RX ORDER — HYDROMORPHONE HYDROCHLORIDE 2 MG/ML
0.5 INJECTION, SOLUTION INTRAMUSCULAR; INTRAVENOUS; SUBCUTANEOUS
Status: DISCONTINUED | OUTPATIENT
Start: 2018-04-29 | End: 2018-04-30 | Stop reason: HOSPADM

## 2018-04-29 RX ORDER — LEVOFLOXACIN 5 MG/ML
500 INJECTION, SOLUTION INTRAVENOUS
Status: DISCONTINUED | OUTPATIENT
Start: 2018-05-01 | End: 2018-04-30 | Stop reason: HOSPADM

## 2018-04-29 RX ORDER — OXYCODONE HYDROCHLORIDE 5 MG/1
5 TABLET ORAL
Status: COMPLETED | OUTPATIENT
Start: 2018-04-29 | End: 2018-04-29

## 2018-04-29 RX ORDER — CARVEDILOL 3.12 MG/1
6.25 TABLET ORAL
Status: COMPLETED | OUTPATIENT
Start: 2018-04-29 | End: 2018-04-29

## 2018-04-29 RX ORDER — AMITRIPTYLINE HYDROCHLORIDE 25 MG/1
25 TABLET, FILM COATED ORAL
Status: DISCONTINUED | OUTPATIENT
Start: 2018-04-29 | End: 2018-04-30 | Stop reason: HOSPADM

## 2018-04-29 RX ORDER — ONDANSETRON 2 MG/ML
4 INJECTION INTRAMUSCULAR; INTRAVENOUS
Status: DISCONTINUED | OUTPATIENT
Start: 2018-04-29 | End: 2018-04-30 | Stop reason: HOSPADM

## 2018-04-29 RX ADMIN — HYDROXYCHLOROQUINE SULFATE 200 MG: 200 TABLET, FILM COATED ORAL at 19:31

## 2018-04-29 RX ADMIN — APIXABAN 5 MG: 5 TABLET, FILM COATED ORAL at 21:33

## 2018-04-29 RX ADMIN — OXYCODONE HYDROCHLORIDE 5 MG: 5 TABLET ORAL at 10:59

## 2018-04-29 RX ADMIN — HYDROMORPHONE HYDROCHLORIDE 0.5 MG: 2 INJECTION INTRAMUSCULAR; INTRAVENOUS; SUBCUTANEOUS at 21:34

## 2018-04-29 RX ADMIN — BUDESONIDE 500 MCG: 0.5 INHALANT RESPIRATORY (INHALATION) at 22:16

## 2018-04-29 RX ADMIN — Medication 10 ML: at 17:25

## 2018-04-29 RX ADMIN — CARVEDILOL 6.25 MG: 3.12 TABLET, FILM COATED ORAL at 10:59

## 2018-04-29 RX ADMIN — AMLODIPINE BESYLATE 10 MG: 5 TABLET ORAL at 12:18

## 2018-04-29 RX ADMIN — AMITRIPTYLINE HYDROCHLORIDE 25 MG: 25 TABLET, FILM COATED ORAL at 21:33

## 2018-04-29 RX ADMIN — HYDROMORPHONE HYDROCHLORIDE 0.5 MG: 2 INJECTION INTRAMUSCULAR; INTRAVENOUS; SUBCUTANEOUS at 17:45

## 2018-04-29 RX ADMIN — ARFORMOTEROL TARTRATE 15 MCG: 15 SOLUTION RESPIRATORY (INHALATION) at 22:16

## 2018-04-29 RX ADMIN — CEFTRIAXONE 1 G: 1 INJECTION, POWDER, FOR SOLUTION INTRAMUSCULAR; INTRAVENOUS at 12:17

## 2018-04-29 RX ADMIN — LEVOFLOXACIN 250 MG: 5 INJECTION, SOLUTION INTRAVENOUS at 21:27

## 2018-04-29 RX ADMIN — LEVOFLOXACIN 500 MG: 5 INJECTION, SOLUTION INTRAVENOUS at 13:23

## 2018-04-29 RX ADMIN — Medication 10 ML: at 21:34

## 2018-04-29 RX ADMIN — HYDROMORPHONE HYDROCHLORIDE 0.5 MG: 2 INJECTION INTRAMUSCULAR; INTRAVENOUS; SUBCUTANEOUS at 13:23

## 2018-04-29 RX ADMIN — IOPAMIDOL 100 ML: 755 INJECTION, SOLUTION INTRAVENOUS at 17:24

## 2018-04-29 RX ADMIN — VANCOMYCIN HYDROCHLORIDE 2000 MG: 10 INJECTION, POWDER, LYOPHILIZED, FOR SOLUTION INTRAVENOUS at 16:04

## 2018-04-29 RX ADMIN — NITROGLYCERIN 1 INCH: 20 OINTMENT TOPICAL at 12:31

## 2018-04-29 NOTE — ED NOTES
TRANSFER - OUT REPORT:    Verbal report given to Sandralee Favre , RN (name) on Angella Jimenez  being transferred to [facility] (unit) for routine progression of care       Report consisted of patients Situation, Background, Assessment and   Recommendations(SBAR). Information from the following report(s) SBAR and ED Summary was reviewed with the receiving nurse. Lines:   Peripheral IV 04/29/18 Left Hand (Active)   Site Assessment Clean, dry, & intact 4/29/2018  8:17 AM   Phlebitis Assessment 0 4/29/2018  8:17 AM   Infiltration Assessment 0 4/29/2018  8:17 AM   Dressing Status Clean, dry, & intact 4/29/2018  8:17 AM   Dressing Type Transparent 4/29/2018  8:17 AM   Hub Color/Line Status Flushed;Pink 4/29/2018  8:17 AM       Peripheral IV 04/29/18 Left Antecubital (Active)        Opportunity for questions and clarification was provided.       Patient transported with:   [facility]

## 2018-04-29 NOTE — ED TRIAGE NOTES
Pt arrived by EMS. Difficulty breathing since this AM, sat in high 80's, put on 4L O2 up to 90's. Dialysis yesterday. Stomach wound drain. Temp 100.1.

## 2018-04-29 NOTE — PROGRESS NOTES
Pharmacist Note - Vancomycin Dosing    Consult provided for this 29 y.o. female for indication of Sepsis of Unknown Etiology. Antibiotic regimen(s): In the ED - Vancomycin x1, Ceftriaxone x 1, and Levaquin x 1    Recent Labs      18   0825   WBC  4.8   CREA  4.01*   BUN  25*     Frequency of BMP: ESRD on OP HD TTS   Height: 165.1 cm  Weight: 69.6 kg  Est CrCl: 19.7 ml/min; UO: --- ml/kg/hr (not yet assessed)  Temp (24hrs), Av.1 °F (37.3 °C), Min:99.1 °F (37.3 °C), Max:99.1 °F (37.3 °C)    Cultures:    Blood - in process    Goal trough = 15 - 20 mcg/mL    Loading dose adjusted from 1500 to 2000 mg in the ER. Will determine maintenance dosing once seen by Nephrology. At recent admission, HD had been held and patient was on ~q48hr dosing by levels, however on discharge patient resumed OP HD on TTS. Pharmacy to follow patient daily and order levels / make dose adjustments as appropriate.

## 2018-04-29 NOTE — CONSULTS
[facility] Consult Note        NAME: Sky Pate       :  1985       MRN:  [id number]     Date/Time: 2018    Risk of deterioration: medium       Assessment:    Plan:  Acute resp failure  ESRD-T//S-WH  Yeast peritonitis with pd drain  Anemia  SLE  PE on eliquis  HTN   Pt couldn't lie flat for CTA-hx of pe, need to rule this out, not sure is she has been taking eliquis-  Reportedly meds are pre packaged thru [address] and then dispensed at HD unit? ?-Can't verify this today  Emergent uf today for 2 hours-attempt volume removal and then cta after-Seen on hd stable, on  without resp distress  PD drain is due to be removed on TUesday-can do if still here-  Using avg without issue today  On chronic immunosupression-wbc normal today (pred/aza)  Note on allopurinol-check uric acid, shouldn't need this now that she is esrd on hd  Broad spectrum abx started  D/W Dr. Tanya Whitley for your help with this pt. Asked to see for esrd needs. Pt presents to ed with acute sob-ate crabs yesterday but also went to hd and had a full treatment (she doesn't know how much fluid was removed-also I wonder if after her prolonged hospitalization that she didn't lose body weight and ? whether weight was adjusted at the hd unit accordingly-can't clarify until tomorrow)    Has a hx of pe-doesn't know if she has been taking eliquis--\"I take the meds in my packet\"--    Subjective:     Chief Complaint:  I ate some crabs yesterday and maybe got too much fluid-    Review of Systems: (+) SOB/IVY/can't lie flat (-)cp/pnd/n/v/f/c (+) pd drain-no pain    Objective:     VITALS:   Last 24hrs VS reviewed since prior progress note.  Most recent are:  Visit Vitals    BP (!) 160/117    Pulse (!) 110    Temp 99.1 °F (37.3 °C)    Resp 29    Ht 5' 5\" (1.651 m)    Wt 69.6 kg (153 lb 6.4 oz)    SpO2 100%    BMI 25.53 kg/m2     SpO2 Readings from Last 6 Encounters:   18 100%   18 98%   18 97%   18 99% 04/20/18 99%   01/29/18 95%    O2 Flow Rate (L/min): 2 l/min   No intake or output data in the 24 hours ending 04/29/18 1415       PHYSICAL EXAM:    General   Wd, chronically ill appearing AAF-  EENT  Ncat, looks cushingoid   Respiratory   Clear anteriorly  Cardiology  RRR  Abdominal  (+) drain  Extremities  AVF--(R) T/B              Lab Data Reviewed: (see below)    Medications Reviewed: (see below)    PMH/ reviewed - no change compared to H&P  ___________________________________________________  }    ___________________________________________________    Attending Physician: Milton Del Toro MD     ____________________________________________________  MEDICATIONS:  Current Facility-Administered Medications   Medication Dose Route Frequency    iopamidol (ISOVUE-370) 76 % injection 100 mL  100 mL IntraVENous RAD ONCE    sodium chloride (NS) flush 10 mL  10 mL IntraVENous RAD ONCE    sodium chloride (NS) flush 10 mL  10 mL IntraVENous RAD ONCE    ondansetron (ZOFRAN) injection 4 mg  4 mg IntraVENous Q4H PRN    levoFLOXacin (LEVAQUIN) 500 mg in D5W IVPB  500 mg IntraVENous NOW    HYDROmorphone (DILAUDID) injection 0.5 mg  0.5 mg IntraVENous Q4H PRN    VANCOMYCIN INFORMATION NOTE   Other Rx Dosing/Monitoring    vancomycin (VANCOCIN) 2000 mg in  ml infusion  2,000 mg IntraVENous NOW    hydrALAZINE (APRESOLINE) 20 mg/mL injection 20 mg  20 mg IntraVENous Q4H PRN     Current Outpatient Prescriptions   Medication Sig    amLODIPine (NORVASC) 5 mg tablet Take 1 Tab by mouth daily. (Patient taking differently: Take 10 mg by mouth daily.)    amitriptyline (ELAVIL) 25 mg tablet Take 1 Tab by mouth nightly. PRN    predniSONE (DELTASONE) 5 mg tablet Take 5 mg by mouth daily.  gemfibrozil (LOPID) 600 mg tablet Take 300 mg by mouth daily.  carvedilol (COREG) 6.25 mg tablet Take 12.5 mg by mouth two (2) times daily (with meals).     apixaban (ELIQUIS) 5 mg tablet Take 5 mg by mouth every twelve (12) hours.    azaTHIOprine (IMURAN) 50 mg tablet Take 50 mg by mouth daily (after breakfast).  hydroxychloroquine (PLAQUENIL) 200 mg tablet Take 200 mg by mouth two (2) times a day.  allopurinol (ZYLOPRIM) 100 mg tablet Take 100 mg by mouth daily (after breakfast). Needs to TAKE on a full stomach; will cause her to be nauseated.  cyanocobalamin (VITAMIN B-12) 2,500 mcg sublingual tablet Take 1 Tab by mouth daily.  pantoprazole (PROTONIX) 40 mg tablet Take 1 Tab by mouth Daily (before breakfast).  HYDROmorphone (DILAUDID) 4 mg tablet Take 1 Tab by mouth every four (4) hours as needed. Max Daily Amount: 24 mg.  polyethylene glycol (MIRALAX) 17 gram packet Take 1 Packet by mouth daily.  fluticasone-vilanterol (BREO ELLIPTA) 200-25 mcg/dose inhaler Take 1 Puff by inhalation daily. Rinse mouth out after use    albuterol (PROVENTIL HFA, VENTOLIN HFA, PROAIR HFA) 90 mcg/actuation inhaler Take 1-2 Puffs by inhalation every four (4) hours as needed for Wheezing or Shortness of Breath.  senna (SENNA) 8.6 mg tablet Take 1 Tab by mouth daily as needed. LABS:  Recent Labs      04/29/18   0825   WBC  4.8   HGB  9.0*   HCT  30.7*   PLT  226     Recent Labs      04/29/18   0825   NA  141   K  4.4   CL  102   CO2  31   BUN  25*   CREA  4.01*   GLU  81   CA  8.1*     Recent Labs      04/29/18   0825   SGOT  48*   ALT  26   AP  305*   TBILI  0.3   TP  6.8   ALB  1.6*   GLOB  5.2*     No results for input(s): INR, PTP, APTT in the last 72 hours. No lab exists for component: INREXT   No results for input(s): FE, TIBC, PSAT, FERR in the last 72 hours. No results for input(s): PH, PCO2, PO2 in the last 72 hours.   Recent Labs      04/29/18   0825   TROIQ  <0.04     Lab Results   Component Value Date/Time    Glucose (POC) 123 (H) 04/20/2018 05:33 PM    Glucose (POC) 95 04/20/2018 11:19 AM    Glucose (POC) 100 04/20/2018 07:18 AM    Glucose (POC) 119 (H) 04/19/2018 09:11 PM    Glucose (POC) 86 04/19/2018 04:30 PM

## 2018-04-29 NOTE — ED NOTES
Pt. Able to maintain room air sats > 90% on room air for longer than 1 hour.   Currently undergoing dialysis

## 2018-04-29 NOTE — IP AVS SNAPSHOT
[address] 
[phone] Patient: Vandana Varghese MRN: [id number] :1985 A check fahad indicates which time of day the medication should be taken. My Medications START taking these medications Instructions Each Dose to Equal  
 Morning Noon Evening Bedtime  
 levoFLOXacin 500 mg tablet Commonly known as:  Radha Hotter Your last dose was: Your next dose is: Take 1 Tab by mouth every fourty-eight (48) hours. Take after HD  
 500 mg CHANGE how you take these medications Instructions Each Dose to Equal  
 Morning Noon Evening Bedtime * amitriptyline 25 mg tablet Commonly known as:  ELAVIL What changed:  Another medication with the same name was changed. Make sure you understand how and when to take each. Your last dose was: Your next dose is: Take 25 mg by mouth nightly as needed for Sleep. 25 mg  
    
   
   
   
  
 * amitriptyline 25 mg tablet Commonly known as:  ELAVIL What changed:  additional instructions Your last dose was: Your next dose is: Take 1 Tab by mouth nightly. PRN  
 25 mg  
    
   
   
   
  
 amLODIPine 5 mg tablet Commonly known as:  Seth Degroot What changed:  how much to take Your last dose was: Your next dose is: Take 1 Tab by mouth daily. 5 mg HYDROmorphone 4 mg tablet Commonly known as:  DILAUDID What changed:  Another medication with the same name was removed. Continue taking this medication, and follow the directions you see here. Your last dose was: Your next dose is: Take 1 Tab by mouth every four (4) hours as needed. Max Daily Amount: 24 mg.  
 4 mg * Notice:   This list has 2 medication(s) that are the same as other medications prescribed for you. Read the directions carefully, and ask your doctor or other care provider to review them with you. CONTINUE taking these medications Instructions Each Dose to Equal  
 Morning Noon Evening Bedtime  
 albuterol 90 mcg/actuation inhaler Commonly known as:  PROVENTIL HFA, VENTOLIN HFA, PROAIR HFA Your last dose was: Your next dose is: Take 1-2 Puffs by inhalation every four (4) hours as needed for Wheezing or Shortness of Breath. 1-2 Puff  
    
   
   
   
  
 allopurinol 100 mg tablet Commonly known as:  Magalene Oyster Your last dose was: Your next dose is: Take 100 mg by mouth daily (after breakfast). Needs to TAKE on a full stomach; will cause her to be nauseated. 100 mg  
    
   
   
   
  
 apixaban 5 mg tablet Commonly known as:  Mimi Pillow Your last dose was: Your next dose is: Take 1 Tab by mouth every twelve (12) hours. 5 mg  
    
   
   
   
  
 azaTHIOprine 50 mg tablet Commonly known as:  [employer] Your last dose was: Your next dose is: Take 50 mg by mouth daily (after breakfast). 50 mg COREG 6.25 mg tablet Generic drug:  carvedilol Your last dose was: Your next dose is: Take 12.5 mg by mouth two (2) times daily (with meals). 12.5 mg  
    
   
   
   
  
 cyanocobalamin 2,500 mcg sublingual tablet Commonly known as:  VITAMIN B-12 Your last dose was: Your next dose is: Take 1 Tab by mouth daily. 2500 mcg  
    
   
   
   
  
 fluticasone-vilanterol 200-25 mcg/dose inhaler Commonly known as:  BREO ELLIPTA Your last dose was: Your next dose is: Take 1 Puff by inhalation daily. Rinse mouth out after use 1 Puff  
    
   
   
   
  
 gemfibrozil 600 mg tablet Commonly known as:  LOPID Your last dose was: Your next dose is: Take 300 mg by mouth daily. 300 mg  
    
   
   
   
  
 pantoprazole 40 mg tablet Commonly known as:  PROTONIX Your last dose was: Your next dose is: Take 1 Tab by mouth Daily (before breakfast). 40 mg  
    
   
   
   
  
 PLAQUENIL 200 mg tablet Generic drug:  hydroxychloroquine Your last dose was: Your next dose is: Take 200 mg by mouth two (2) times a day. 200 mg  
    
   
   
   
  
 polyethylene glycol 17 gram packet Commonly known as:  Ronda Plane Your last dose was: Your next dose is: Take 1 Packet by mouth daily. 17 g  
    
   
   
   
  
 predniSONE 5 mg tablet Commonly known as:  Lennart Spearing Your last dose was: Your next dose is: Take 5 mg by mouth daily. 5 mg Senna 8.6 mg tablet Generic drug:  senna Your last dose was: Your next dose is: Take 1 Tab by mouth daily as needed. 1 Tab Where to Get Your Medications Information on where to get these meds will be given to you by the nurse or doctor. ! Ask your nurse or doctor about these medications  
  apixaban 5 mg tablet HYDROmorphone 4 mg tablet  
 levoFLOXacin 500 mg tablet

## 2018-04-29 NOTE — ED NOTES
Bedside shift change report given to Armani Rm RN (oncoming nurse) by Michelet Ballesteros RN (offgoing nurse). Report included the following information SBAR, ED Summary, MAR and Recent Results.

## 2018-04-29 NOTE — H&P
History and Physical  Primary Care Provider: Darryle Pulley, NP    Subjective:     Vandana Varghese is a 29 y.o. female with h/o SLE, ESRD on HD TTS, h/o PE on Eliquis, HTN,HLD, and recurrent peritonitis presents with c/o dyspnea of sudden onset this am. Pt was previously on PD since 2015 but developed Candida peritonitis in 12/17 therefore transitioned to HD at that time. She had a recent prolonged and complicated hospitalization 3/11-4/20/18 secondary to sepsis from E. Coli peritonitis, treated with  iv Zosyn . During that hosp, she was found to have loculated fluid with abscess in abd which did not respond to iv abx therefore pt ws taken to OR for abd washout with initial peritoneal drain placement. She subseq had JENNIFER drain placed to left lower abd and cultures obtained from drain came back negative therefore pt did not require any further abx as per ID and pt was d/c home with JENNIFER drain in place. She did ok upon d/c and cont to f/u for HD TTS, last HD was yest. She denies missing any HD sessions. Upon waking up this am, pt reports she was extremely dyspneic and unable to lie flat. She does have leg swelling but denies any increased swelling, denies any weight gain, denies any CP either. Upon arrival to ED, pt was noted to be hypoxic mid 80%RA, /124, temp 99.1, an tachypneic. CXR with b/l airspace disease. In ED, pt recvd iv vanco and levaquin. Pt was sent for CTA but she was unable to tolerate as she could not lie flat due to dyspnea. Pt does report eating a lot of crabs since her d/c home 9 days ago, including yest and notes it was salty. Pt also with h/o PE dx in 2012, treated with Eliquis. She was also hosp in 10/17 and had a VQ scan then noted for high prob for PE therefore hematology rec pt take Eliquis lifelong. Upon asking if pt has been compliant with all her meds including Eliquis, pt states that apparently the \"dialysis nurses set out her meds in a pill box for her and she takes whatever is in there.'  Pt is being admitted for further mgmt. Review of Systems:  Further 10 point review of systems is benign. A comprehensive review of systems was negative except for that written in the History of Present Illness. Past Medical History:   Diagnosis Date    Anemia     secondary to lupus    Asthma     no inhaler use in past 2 to 3 years    Carditis     Chronic kidney disease     ESRD    Chronic pain     DDD (degenerative disc disease), lumbar     ESRD (end stage renal disease) (HCC)     GERD (gastroesophageal reflux disease)     Heart failure ([facility]     Hemodialysis patient ([facility] 2017    [address]  Tuesday,  Thursday,  and Saturday.  Hypercholesterolemia     Hypertension     Intractable nausea and vomiting 10/21/2015    Long term (current) use of anticoagulants     Lupus     Lupus (systemic lupus erythematosus) (HCC)     Malignant hypertension with chronic kidney disease stage V ([facility]     Peritoneal dialysis status ([facility] 10/2015    x 2 years Stopped 2017 due to infection and removed.  Poor historian 2018    With medications    Thromboembolus ([facility] 2013    lungs    Transfusion history     Last Transfusion 2017  at [facility]      Past Surgical History:   Procedure Laterality Date    HX  SECTION  11/2006    x1    HX OTHER SURGICAL  9/16/15    INSERTION PD CATH; Removed 2017    HX VASCULAR ACCESS Right 2017    Double-Lumen henry catheter upper chest     Prior to Admission medications    Medication Sig Start Date End Date Taking? Authorizing Provider   amLODIPine (NORVASC) 5 mg tablet Take 1 Tab by mouth daily. Patient taking differently: Take 10 mg by mouth daily. 18  Yes Kirsty Link MD   amitriptyline (ELAVIL) 25 mg tablet Take 1 Tab by mouth nightly. PRN 3/5/18  Yes Kasia Josue NP   predniSONE (DELTASONE) 5 mg tablet Take 5 mg by mouth daily.    Yes Historical Provider   gemfibrozil (LOPID) 600 mg tablet Take 300 mg by mouth daily. 11/3/17  Yes Historical Provider   carvedilol (COREG) 6.25 mg tablet Take 12.5 mg by mouth two (2) times daily (with meals). Yes Historical Provider   apixaban (ELIQUIS) 5 mg tablet Take 5 mg by mouth every twelve (12) hours. Yes Historical Provider   azaTHIOprine (IMURAN) 50 mg tablet Take 50 mg by mouth daily (after breakfast). 11/3/17  Yes Historical Provider   hydroxychloroquine (PLAQUENIL) 200 mg tablet Take 200 mg by mouth two (2) times a day. Yes Dranell Franks MD   allopurinol (ZYLOPRIM) 100 mg tablet Take 100 mg by mouth daily (after breakfast). Needs to TAKE on a full stomach; will cause her to be nauseated. 10/15/15  Yes Historical Provider   cyanocobalamin (VITAMIN B-12) 2,500 mcg sublingual tablet Take 1 Tab by mouth daily. 10/27/15  Yes Liz Arauz MD   pantoprazole (PROTONIX) 40 mg tablet Take 1 Tab by mouth Daily (before breakfast). 10/23/15  Yes Lieutenant Marquez MD   HYDROmorphone (DILAUDID) 4 mg tablet Take 1 Tab by mouth every four (4) hours as needed. Max Daily Amount: 24 mg. 4/20/18   Madina Bazzi MD   polyethylene glycol (MIRALAX) 17 gram packet Take 1 Packet by mouth daily. 4/21/18   Madina Bazzi MD   fluticasone-vilanterol (BREO ELLIPTA) 200-25 mcg/dose inhaler Take 1 Puff by inhalation daily. Rinse mouth out after use 12/8/17   Chester Booker NP   albuterol (PROVENTIL HFA, VENTOLIN HFA, PROAIR HFA) 90 mcg/actuation inhaler Take 1-2 Puffs by inhalation every four (4) hours as needed for Wheezing or Shortness of Breath. 10/30/17   Aris Keith NP   senna (SENNA) 8.6 mg tablet Take 1 Tab by mouth daily as needed.     Historical Provider     Allergies   Allergen Reactions    Compazine [Prochlorperazine Edisylate] Nausea and Vomiting and Palpitations      Family History   Problem Relation Age of Onset    Diabetes Father     Hypertension Father     Cancer Other      aunt with breast cancer    Diabetes Mother         SOCIAL HISTORY:  Patient resides at Home. Patient ambulates with self. Smoking history:denies  Alcohol history:denies    Objective:       Physical Exam:   Visit Vitals    BP (!) 160/117    Pulse (!) 110    Temp 99.1 °F (37.3 °C)    Resp 29    Ht 5' 5\" (1.651 m)    Wt 69.6 kg (153 lb 6.4 oz)    LMP 04/29/2017 (Approximate)    SpO2 100%    BMI 25.53 kg/m2     General appearance: alert, cooperative, no distress, appears stated age, appears older than stated age, cachectic  Head: Normocephalic, without obvious abnormality, atraumatic  Eyes: conjunctivae/corneas clear. PERRL, EOM's intact. Fundi benign  Lungs: rales diffuse, decr b/l air entry  Heart: tachy but regular  Abdomen: soft, non-tender. Bowel sounds normal. No masses,  no organomegaly. GABRIELA drain LLQ  Extremities: edema BLE pitting edema R>>L  Neurologic: Grossly normal    ECG:  Sinus tachy    Data Review: All diagnostic labs and studies have been reviewed. Chest x-ray b/l airspace disease r>l    Assessment:     Active Problems:    Sepsis ([facility] (4/29/2018)        Plan:     ACUTE HYPOXIC RESPIRATORY FAILURE  -admit to imcu with tele, inpt  -ddx fluid overload in setting if incr salt intake vs PE with concern for poss noncompliance to [facility] vs poss PNA(hcap)  -cxr with b/l airpsace dis  -pend CTA. Pt unable to nakita d/t dyspnea. Called on call renal dr. Erin Aguayo, will arrange for couple hrs HD session today then send for CTA thereafter  -recvd vanco/levaqin in ed. Will cont renal dosing vanco/levaquin,add zosyn for now for hcap coverage  -O2 wean as nakita  -prn alb  - d/w RN in ED to give pt her Eliquis stat  -chk SpCx    SEPSIS (tachy, tachypneic, low grade fever)  -poss pna. Also h/o recent peritonitis as poss etio  -normal LA  -iv vanco/zosyn with HD/levaquin q 48hr renal dosing. Pharm c/s to assist   -pend BCx, chk SpCx  -send for repeat cxr peritoneal fluid/gabriela drain  -consider ID c/s if no improvement     H/O PE  -initially dx 2012 and pt on eliquis.  vq scan 10/2017 with high prob pe. Seen by heme during that hosp with rec to cont eliquis indefinitely  -question if pt compliant with oac  -to have CTA today immediately after HD to r/o pe, assess for pna    ESRD , ON HD TTS  -sec to SLE. prev on PD since 2015. Dev candida peritinotis 12/17 and transitioned to HD thereafter  -to have extra session today for couple hrs as d/w dr. Lauren Lewis on call.  Prim renal dr chinyere jauregui  -apprec renal assistance    ANEMIA OF CKD  -monitor h/h    H/O RECENT E COLI PERITONITIS  -h/o candida peritonitis 12/17, e coli peritonitis earlier this month  -chk cx if poss   -mnitor for recurrent fever  -on abx    SLE  -prim rheum dr Razia jauregui   -cont prednisone, azathioprin,plaquenil for now    HTN  -coreg, norvasc    HLD  -lopid    ASTHMA  -prn alb, home inhaler    SEVERE PROTEIN CALORIE MALNUTRITION  -renal diet orered, c/s nutrition    DVT P/X  eliquis  FULL code      Signed By: Anish Sullivan MD     April 29, 2018

## 2018-04-29 NOTE — PROGRESS NOTES
Day # 1 of Levaquin  Indication:  Sepsis of unknown origin  Current regimen:  750 mg q48h  Abx regimen: Levaquin + Zosyn + Vanc  Recent Labs      18   0825   WBC  4.8   CREA  4.01*   BUN  25*     Est CrCl: 19.7 ml/min (patient is anuric, HD) ESRD-T//S-  Temp (24hrs), Av.4 °F (36.9 °C), Min:98 °F (36.7 °C), Max:99.1 °F (37.3 °C)    Cultures:   2018: Blood cx sent  2018: Fluid cx sent    Plan: Added 250 mg to 500 mg Levaquin to = 750 mg x 1, followed by 500 mg Q48H based on renal function

## 2018-04-29 NOTE — ED PROVIDER NOTES
HPI Comments: 29 y.o. female with past medical history significant for Lupus, GERD, Heart Failure, CKD, and Hypertension who presents from Home with chief complaint of Shortness of Breath. History of present illness: Patient has lupus. She has chronic renal insufficiency and is dialyzed Tuesday Thursday Saturday. She went to dialysis yesterday which was Saturday. Patient was on peritoneal dialysis until December when she had peritonitis. She had another episode of peritonitis and was discharged 9 days ago. They did some sort of peritoneal washing and patient has a drain in her left lower quadrant connected to a bulb. Patient is not complaining of abdominal pain today. She reports being compliant with her blood pressure medications. She admits to eating high salt diet especially eating crabs since she was dialyzed yesterday. Patient woke today several hours ago short of breath. She has significant ankle edema but reports this is less than usual. She denies chest pain. Patient has tachypnea. There are no other acute medical concerns at this time. PCP: Zamzam Crain NP    The history is provided by the patient. Past Medical History:   Diagnosis Date    Anemia     secondary to lupus    Asthma     no inhaler use in past 2 to 3 years    Carditis     Chronic kidney disease     ESRD    Chronic pain     DDD (degenerative disc disease), lumbar     ESRD (end stage renal disease) (HCC)     GERD (gastroesophageal reflux disease)     Heart failure ([address]     Hemodialysis patient ([address] 12/21/2017    [address]  Tuesday,  Thursday,  and Saturday.     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  Poor historian 2018    With medications    Thromboembolus ([address] 2013    lungs    Transfusion history     Last Transfusion 2017  at [facility]       Past Surgical History:   Procedure Laterality Date    HX  SECTION  11/2006    x1    HX OTHER SURGICAL  9/16/15    INSERTION PD CATH; Removed 2017    HX VASCULAR ACCESS Right 2017    Double-Lumen henry catheter upper chest         Family History:   Problem Relation Age of Onset    Diabetes Father     Hypertension Father     Cancer Other      aunt with breast cancer    Diabetes Mother        Social History     Social History    Marital status: SINGLE     Spouse name: N/A    Number of children: N/A    Years of education: N/A     Occupational History    Not on file. Social History Main Topics    Smoking status: Never Smoker    Smokeless tobacco: Never Used    Alcohol use No    Drug use: Yes     Special: Prescription, OTC    Sexual activity: Not Currently     Other Topics Concern     Service No    Blood Transfusions No    Caffeine Concern No    Occupational Exposure No    Hobby Hazards No    Sleep Concern No    Stress Concern No    Weight Concern No    Special Diet No    Back Care Yes     low back pain    Exercise No    Bike Helmet No    Seat Belt Yes    Self-Exams No     Social History Narrative    Lives with parents and daughter. ALLERGIES: Compazine [prochlorperazine edisylate]    Review of Systems   Constitutional: Negative for appetite change, chills and fever. HENT: Negative for rhinorrhea, sore throat and trouble swallowing. Eyes: Negative for photophobia. Respiratory: Positive for shortness of breath. Negative for cough. Cardiovascular: Positive for leg swelling. Negative for chest pain and palpitations. Gastrointestinal: Positive for abdominal pain. Negative for nausea and vomiting. Genitourinary: Negative for dysuria, frequency and hematuria. Musculoskeletal: Negative for arthralgias. Neurological: Negative for dizziness, syncope and weakness. Psychiatric/Behavioral: Negative for behavioral problems. The patient is not nervous/anxious. All other systems reviewed and are negative. Vitals:    04/29/18 0815 04/29/18 0830 04/29/18 0845 04/29/18 0900   BP: (!) 156/116 (!) 169/121 (!) 166/118 (!) 153/119   Pulse: (!) 125 (!) 122 (!) 122 (!) 115   Resp: (!) 43 28 27 (!) 31   Temp:       SpO2: 92% 93% 99% 99%   Weight:       Height:                Physical Exam   Constitutional:   Chronically ill-appearing   HENT:   Head: Normocephalic and atraumatic. Mouth/Throat: Oropharynx is clear and moist.   Eyes: EOM are normal. Pupils are equal, round, and reactive to light. Neck: Normal range of motion. Neck supple. Cardiovascular: Regular rhythm and intact distal pulses. Exam reveals no gallop and no friction rub. No murmur heard. Hyperdynamic;rate 120   Pulmonary/Chest: Effort normal. No respiratory distress. She has no wheezes. She has no rales. Right basilar rales; decreased breath sounds left base   Abdominal: Soft. There is no tenderness. There is no rebound. Left lower quadrant drain   Musculoskeletal: Normal range of motion. She exhibits edema. She exhibits no tenderness. 2/4 bilateral ankle   Neurological: She is alert. No cranial nerve deficit. Motor; symmetric   Skin: No erythema. Psychiatric: She has a normal mood and affect. Her behavior is normal.   Nursing note and vitals reviewed. [facility]      ED Course       Procedures    Total critical care time spent exclusive of procedures:  40 minutes       CONSULT NOTE:  10:24 AM Rasta Diaz MD spoke with Dr. Dr. Luke Singleton, Consult for Hospitalist.  Discussed available diagnostic tests and clinical findings. Dr. Luke Singleton will evaluate the patient for admission    CONSULT NOTE:  10:36 AM Rasta Diaz MD spoke with Dr. Gena Naylor, Consult for Nephrology.   Discussed available diagnostic tests and clinical findings. ED EKG interpretation:  Rhythm: sinus tachycardia; and regular . Rate (approx.): 120; Axis: normal; P wave: 94 ms; QRS interval: normal ; ST/T wave: normal; in  Lead: ; Other findings: . This EKG was interpreted by Ayanna Koo MD,ED Provider.  12:45 PM

## 2018-04-29 NOTE — DIALYSIS
[facility] Dialysis Team [facility]  [phone]    Vitals   Pre   Post   Assessment   Pre   Post     Temp  98.5 98.0 LOC  A&O x3 A&O x3   HR   107 109 Lungs   coarse  improved   B/P  139/102 131/98 Cardiac   tachycardia  tachycardia   Resp   20 17 Skin   Warm, dry  warm, dry   Pain level  3/10 primary RN aware 7/10 Edema  LE BL     LE BL   Orders:    Duration:   Start:    1405 End:    1605 Total:   2 hrs   Dialyzer:   Dialyzer/Set Up Inspection: [employer] ([id number]) (04/29/18 1400)   K Bath:       Ca Bath:       Na/Bicarb: Target Fluid Removal:   Goal/Amount of Fluid to Remove (mL): 4000 mL (04/29/18 1400)   Access     Type & Location:   PETER AVG: +B&T noted, no redness or drainage, site cleaned with alcohol, cannulated with 15 G 1 \" needless x2, +flashes/aspir/NS flushes   Labs     Obtained/Reviewed   Critical Results Called   Date when labs were drawn-  Hgb-    HGB   Date Value Ref Range Status   04/29/2018 9.0 (L) 11.5 - 16.0 g/dL Final     K-    Potassium   Date Value Ref Range Status   04/29/2018 4.4 3.5 - 5.1 mmol/L Final     Ca-   Calcium   Date Value Ref Range Status   04/29/2018 8.1 (L) 8.5 - 10.1 MG/DL Final     Bun-   BUN   Date Value Ref Range Status   04/29/2018 25 (H) 6 - 20 MG/DL Final     Creat-   Creatinine   Date Value Ref Range Status   04/29/2018 4.01 (H) 0.55 - 1.02 MG/DL Final        Medications/ Blood Products Given     Name   Dose   Route and Time           none          Blood Volume Processed (BVP):    0 L Net Fluid   Removed:  4000 cc   Comments   Time Out Done: 1400  Primary Nurse Rpt Pre: Hasmukh Yen RN  Primary Nurse Rpt Post: Hasmukh Yen RN  Pt Education: access care, procedure, fluid management  Care Plan: continue HD tx as per MD order  Tx Summary: tolerated tx well at the end remaining blood in circuit returned with 300 cc NS, cannulas removed x2, pressure held until hemostasis obtained, about 8 min on each site, +B&T still noted, dressing applied. Family at the bedside. Admiting Diagnosis:  Pt's previous clinic-  Consent signed - Informed Consent Verified: Yes (04/29/18 1400)  [facility] Consent - yes  Hepatitis Status- Hep B Ag neg 03/31/18  Machine #- Machine Number: [id number] (04/29/18 1400)  Telemetry status- monitored at the bedside  Pre-dialysis wt. - Pre-Dialysis Weight: 77 kg (169 lb 12.1 oz) (04/29/18 1400)   Post-dialysis wt - 73 kg

## 2018-04-30 ENCOUNTER — HOME CARE VISIT (OUTPATIENT)
Dept: HOME HEALTH SERVICES | Facility: HOME HEALTH | Age: 33
End: 2018-04-30
Payer: MEDICARE

## 2018-04-30 VITALS
OXYGEN SATURATION: 94 % | WEIGHT: 151.24 LBS | SYSTOLIC BLOOD PRESSURE: 147 MMHG | RESPIRATION RATE: 12 BRPM | HEIGHT: 65 IN | HEART RATE: 94 BPM | DIASTOLIC BLOOD PRESSURE: 104 MMHG | BODY MASS INDEX: 25.2 KG/M2 | TEMPERATURE: 97.7 F

## 2018-04-30 LAB
ANION GAP SERPL CALC-SCNC: 7 MMOL/L (ref 5–15)
BASOPHILS # BLD: 0 K/UL (ref 0–0.1)
BASOPHILS NFR BLD: 1 % (ref 0–1)
BUN SERPL-MCNC: 29 MG/DL (ref 6–20)
BUN/CREAT SERPL: 6 (ref 12–20)
CALCIUM SERPL-MCNC: 8.4 MG/DL (ref 8.5–10.1)
CHLORIDE SERPL-SCNC: 100 MMOL/L (ref 97–108)
CO2 SERPL-SCNC: 31 MMOL/L (ref 21–32)
CREAT SERPL-MCNC: 4.63 MG/DL (ref 0.55–1.02)
DIFFERENTIAL METHOD BLD: ABNORMAL
EOSINOPHIL # BLD: 0.1 K/UL (ref 0–0.4)
EOSINOPHIL NFR BLD: 2 % (ref 0–7)
ERYTHROCYTE [DISTWIDTH] IN BLOOD BY AUTOMATED COUNT: 17.4 % (ref 11.5–14.5)
GLUCOSE SERPL-MCNC: 77 MG/DL (ref 65–100)
HCT VFR BLD AUTO: 30 % (ref 35–47)
HGB BLD-MCNC: 8.7 G/DL (ref 11.5–16)
IMM GRANULOCYTES # BLD: 0 K/UL (ref 0–0.04)
IMM GRANULOCYTES NFR BLD AUTO: 0 % (ref 0–0.5)
LYMPHOCYTES # BLD: 0.7 K/UL (ref 0.8–3.5)
LYMPHOCYTES NFR BLD: 18 % (ref 12–49)
MCH RBC QN AUTO: 28 PG (ref 26–34)
MCHC RBC AUTO-ENTMCNC: 29 G/DL (ref 30–36.5)
MCV RBC AUTO: 96.5 FL (ref 80–99)
MONOCYTES # BLD: 0.2 K/UL (ref 0–1)
MONOCYTES NFR BLD: 4 % (ref 5–13)
NEUTS SEG # BLD: 2.9 K/UL (ref 1.8–8)
NEUTS SEG NFR BLD: 74 % (ref 32–75)
NRBC # BLD: 0 K/UL (ref 0–0.01)
NRBC BLD-RTO: 0 PER 100 WBC
PLATELET # BLD AUTO: 201 K/UL (ref 150–400)
PMV BLD AUTO: 9.7 FL (ref 8.9–12.9)
POTASSIUM SERPL-SCNC: 4.5 MMOL/L (ref 3.5–5.1)
RBC # BLD AUTO: 3.11 M/UL (ref 3.8–5.2)
SODIUM SERPL-SCNC: 138 MMOL/L (ref 136–145)
URATE SERPL-MCNC: 4.7 MG/DL (ref 2.6–6)
WBC # BLD AUTO: 3.9 K/UL (ref 3.6–11)

## 2018-04-30 PROCEDURE — 94640 AIRWAY INHALATION TREATMENT: CPT

## 2018-04-30 PROCEDURE — 85025 COMPLETE CBC W/AUTO DIFF WBC: CPT | Performed by: HOSPITALIST

## 2018-04-30 PROCEDURE — 3331090001 HH PPS REVENUE CREDIT

## 2018-04-30 PROCEDURE — 3331090002 HH PPS REVENUE DEBIT

## 2018-04-30 PROCEDURE — 74011250636 HC RX REV CODE- 250/636: Performed by: HOSPITALIST

## 2018-04-30 PROCEDURE — 74011000250 HC RX REV CODE- 250: Performed by: HOSPITALIST

## 2018-04-30 PROCEDURE — 74011636637 HC RX REV CODE- 636/637: Performed by: HOSPITALIST

## 2018-04-30 PROCEDURE — 80048 BASIC METABOLIC PNL TOTAL CA: CPT | Performed by: HOSPITALIST

## 2018-04-30 PROCEDURE — 77030027138 HC INCENT SPIROMETER -A

## 2018-04-30 PROCEDURE — 74011250637 HC RX REV CODE- 250/637: Performed by: HOSPITALIST

## 2018-04-30 PROCEDURE — 36415 COLL VENOUS BLD VENIPUNCTURE: CPT | Performed by: HOSPITALIST

## 2018-04-30 PROCEDURE — 74011000258 HC RX REV CODE- 258: Performed by: HOSPITALIST

## 2018-04-30 PROCEDURE — 84550 ASSAY OF BLOOD/URIC ACID: CPT | Performed by: INTERNAL MEDICINE

## 2018-04-30 RX ORDER — HYDROMORPHONE HYDROCHLORIDE 4 MG/1
4 TABLET ORAL
Qty: 30 TAB | Refills: 0 | Status: SHIPPED | OUTPATIENT
Start: 2018-04-30 | End: 2018-05-12

## 2018-04-30 RX ORDER — LEVOFLOXACIN 500 MG/1
500 TABLET, FILM COATED ORAL
Qty: 3 TAB | Refills: 0 | Status: SHIPPED | OUTPATIENT
Start: 2018-04-30 | End: 2018-04-30

## 2018-04-30 RX ORDER — LEVOFLOXACIN 500 MG/1
500 TABLET, FILM COATED ORAL
Qty: 3 TAB | Refills: 0 | Status: SHIPPED | OUTPATIENT
Start: 2018-04-30 | End: 2018-05-12

## 2018-04-30 RX ADMIN — HYDROMORPHONE HYDROCHLORIDE 0.5 MG: 2 INJECTION INTRAMUSCULAR; INTRAVENOUS; SUBCUTANEOUS at 06:27

## 2018-04-30 RX ADMIN — HYDROMORPHONE HYDROCHLORIDE 0.5 MG: 2 INJECTION INTRAMUSCULAR; INTRAVENOUS; SUBCUTANEOUS at 10:28

## 2018-04-30 RX ADMIN — Medication 10 ML: at 14:47

## 2018-04-30 RX ADMIN — APIXABAN 5 MG: 5 TABLET, FILM COATED ORAL at 10:26

## 2018-04-30 RX ADMIN — ACETAMINOPHEN 650 MG: 325 TABLET, FILM COATED ORAL at 03:14

## 2018-04-30 RX ADMIN — BUDESONIDE 500 MCG: 0.5 INHALANT RESPIRATORY (INHALATION) at 10:17

## 2018-04-30 RX ADMIN — PREDNISONE 5 MG: 5 TABLET ORAL at 10:25

## 2018-04-30 RX ADMIN — PIPERACILLIN SODIUM,TAZOBACTAM SODIUM 3.38 G: 3; .375 INJECTION, POWDER, FOR SOLUTION INTRAVENOUS at 06:12

## 2018-04-30 RX ADMIN — ARFORMOTEROL TARTRATE 15 MCG: 15 SOLUTION RESPIRATORY (INHALATION) at 10:17

## 2018-04-30 RX ADMIN — AMLODIPINE BESYLATE 10 MG: 5 TABLET ORAL at 10:28

## 2018-04-30 RX ADMIN — HYDROXYCHLOROQUINE SULFATE 200 MG: 200 TABLET, FILM COATED ORAL at 10:27

## 2018-04-30 RX ADMIN — HYDROMORPHONE HYDROCHLORIDE 0.5 MG: 2 INJECTION INTRAMUSCULAR; INTRAVENOUS; SUBCUTANEOUS at 01:52

## 2018-04-30 RX ADMIN — CARVEDILOL 12.5 MG: 12.5 TABLET, FILM COATED ORAL at 10:28

## 2018-04-30 RX ADMIN — AZATHIOPRINE 50 MG: 50 TABLET ORAL at 10:27

## 2018-04-30 RX ADMIN — VITAM B12 2500 MCG: 100 TAB at 10:26

## 2018-04-30 RX ADMIN — Medication 10 ML: at 06:00

## 2018-04-30 RX ADMIN — PANTOPRAZOLE SODIUM 40 MG: 40 TABLET, DELAYED RELEASE ORAL at 06:31

## 2018-04-30 RX ADMIN — POLYETHYLENE GLYCOL 3350 17 G: 17 POWDER, FOR SOLUTION ORAL at 10:28

## 2018-04-30 RX ADMIN — ALLOPURINOL 100 MG: 100 TABLET ORAL at 10:26

## 2018-04-30 RX ADMIN — HYDROMORPHONE HYDROCHLORIDE 0.5 MG: 2 INJECTION INTRAMUSCULAR; INTRAVENOUS; SUBCUTANEOUS at 14:47

## 2018-04-30 RX ADMIN — GEMFIBROZIL 300 MG: 600 TABLET ORAL at 10:27

## 2018-04-30 NOTE — HOME CARE
Patient is open to [facility] for SN/PT with certification period through 6/21/18. Home health liaison will follow patient progress for resumption of home health SN/PT orders. Ruby Avilez RN Liaison

## 2018-04-30 NOTE — DISCHARGE SUMMARY
Discharge Summary     Patient:  Carmen Hopper       MRN: [id number]       YOB: 1985       Age: 29 y.o. Date of admission:  4/29/2018    Date of discharge:  4/30/2018    Primary care provider: Dr. Mary Morales, NP    Admitting provider:  Enrique Jj MD    Discharging provider:  Kailee Youngblood MD - [phone]  If unavailable, call [phone] and ask the  to page the triage hospitalist.    Consultations  · IP CONSULT TO HOSPITALIST  · IP CONSULT TO NEPHROLOGY    Procedures  · * No surgery found *    Discharge destination: HOME. The patient is stable for discharge. Admission diagnosis  · Hypoxia  · Volume Overload    Current Discharge Medication List      START taking these medications    Details   levoFLOXacin (LEVAQUIN) 500 mg tablet Take 1 Tab by mouth every fourty-eight (48) hours. Take after HD  Qty: 3 Tab, Refills: 0         CONTINUE these medications which have CHANGED    Details   apixaban (ELIQUIS) 5 mg tablet Take 1 Tab by mouth every twelve (12) hours. Qty: 60 Tab, Refills: 1      HYDROmorphone (DILAUDID) 4 mg tablet Take 1 Tab by mouth every four (4) hours as needed. Max Daily Amount: 24 mg. Qty: 30 Tab, Refills: 0    Associated Diagnoses: Peritonitis ([address]         CONTINUE these medications which have NOT CHANGED    Details   amLODIPine (NORVASC) 5 mg tablet Take 1 Tab by mouth daily. Qty: 30 Tab, Refills: 1      polyethylene glycol (MIRALAX) 17 gram packet Take 1 Packet by mouth daily. Qty: 30 Packet, Refills: 0      !! amitriptyline (ELAVIL) 25 mg tablet Take 1 Tab by mouth nightly. PRN  Qty: 30 Tab, Refills: 5    Associated Diagnoses: Chronic bilateral low back pain without sciatica      predniSONE (DELTASONE) 5 mg tablet Take 5 mg by mouth daily. fluticasone-vilanterol (BREO ELLIPTA) 200-25 mcg/dose inhaler Take 1 Puff by inhalation daily.  Rinse mouth out after use  Qty: 1 Inhaler, Refills: 5    Associated Diagnoses: Acute bronchiolitis with bronchospasm      gemfibrozil (LOPID) 600 mg tablet Take 300 mg by mouth daily. carvedilol (COREG) 6.25 mg tablet Take 12.5 mg by mouth two (2) times daily (with meals). azaTHIOprine (IMURAN) 50 mg tablet Take 50 mg by mouth daily (after breakfast). hydroxychloroquine (PLAQUENIL) 200 mg tablet Take 200 mg by mouth two (2) times a day. allopurinol (ZYLOPRIM) 100 mg tablet Take 100 mg by mouth daily (after breakfast). Needs to TAKE on a full stomach; will cause her to be nauseated. cyanocobalamin (VITAMIN B-12) 2,500 mcg sublingual tablet Take 1 Tab by mouth daily. Qty: 90 Tab, Refills: 1    Associated Diagnoses: Leukopenia; Low vitamin B12 level; Hypotension due to drugs      pantoprazole (PROTONIX) 40 mg tablet Take 1 Tab by mouth Daily (before breakfast). Qty: 30 Tab, Refills: 0      senna (SENNA) 8.6 mg tablet Take 1 Tab by mouth daily as needed. !! amitriptyline (ELAVIL) 25 mg tablet Take 25 mg by mouth nightly as needed for Sleep.      albuterol (PROVENTIL HFA, VENTOLIN HFA, PROAIR HFA) 90 mcg/actuation inhaler Take 1-2 Puffs by inhalation every four (4) hours as needed for Wheezing or Shortness of Breath. Qty: 1 Inhaler, Refills: 2       !! - Potential duplicate medications found. Please discuss with provider. Follow-up Information     Follow up With Details Comments Rajesh Colorado NP In 2 weeks Discharge follow up  Luisa Mehta [address]      Rudy Torres MD  at your appointment in AM at [facility]  [phone]      your HD  T-Th-Sat           Final discharge diagnoses and brief hospital course  Please also refer to the admission H&P for details on the presenting problem.     28 yo female with PMHx SLE, on chronic steroids, ESRD on HD T/TH/Sat, PE on Eliquis for life, admitted for SOB and Hypoxia. Pt states she had some Crabs on Sunday then noticed SOB. Hypoxia due to Volume Overload   - s/p UF with 4L removal  - much better  - on RA now   - educated on importance of diet restriction    Hx of PE  - needs Eliquis for life    SLE  - resume home meds and Prednisone  - chronic pain    HTN, uncontrolled  - much better with UF  - c/w Amlodipine, Coreg,     ESRD on HD  - c/w HD T/Th/Sat  - monitor    Severe protein calorie malnutrition  -consult to nutrition    B/L Atelectasis, doubt pneumonia  - patient high risk for developing pneumonia given significant atelectasis  - will give Levaquin X 1 week        FOLLOW-UP TESTS recommended:   - Levaquin 500mg every 48 hrs,take after Dialysis      PENDING TEST RESULTS:  At the time of your discharge the following test results are still pending: NONE  Please make sure you review these results with your outpatient follow-up provider(s).     SYMPTOMS to watch for: chest pain, shortness of breath, fever, chills, nausea, vomiting, diarrhea, change in mentation, falling, weakness, bleeding.     DIET/what to eat:  Renal Diet     ACTIVITY:  Activity as tolerated     WOUND CARE: NONE     EQUIPMENT needed:  NONE    Physical examination at discharge  Visit Vitals    BP (!) 139/100    Pulse (!) 112    Temp 98.2 °F (36.8 °C)    Resp 12    Ht 5' 5\" (1.651 m)    Wt 68.6 kg (151 lb 3.8 oz)    SpO2 93%    BMI 25.17 kg/m2     AO3  PERRLA  EOMI  Lung: b/l rales at base  CVS: RRR  Abd: soft NT ND + JENNIFER drain  Ext: RUE chronic edema and swelling    Pertinent imaging studies:  CTA Chest: IMPRESSION:     1. Bilateral pleural effusions with patchy ill-defined airspace opacities  bilaterally.  No acute pulmonary embolus      Recent Labs      04/30/18   0325  04/29/18   0825   WBC  3.9  4.8   HGB  8.7*  9.0*   HCT  30.0*  30.7*   PLT  201  226     Recent Labs      04/30/18   0325  04/29/18   0825   NA  138  141   K  4.5  4.4   CL  100  102   CO2  31  31   BUN  29*  25*   CREA 4.63*  4.01*   GLU  77  81   CA  8.4*  8.1*   URICA  4.7   --      Recent Labs      04/29/18   0825   SGOT  48*   AP  305*   TP  6.8   ALB  1.6*   GLOB  5.2*     No results for input(s): INR, PTP, APTT in the last 72 hours. No lab exists for component: INREXT   No results for input(s): FE, TIBC, PSAT, FERR in the last 72 hours. No results for input(s): PH, PCO2, PO2 in the last 72 hours. No results for input(s): CPK, CKMB in the last 72 hours.     No lab exists for component: TROPONINI  No components found for: [place]    Chronic Diagnoses:    Problem List as of 4/30/2018  Date Reviewed: 3/11/2018          Codes Class Noted - Resolved    Sepsis ([facility] ICD-10-CM: A41.9  ICD-9-CM: 038.9, 995.91  4/29/2018 - Present        Generalized abdominal pain ICD-10-CM: R10.84  ICD-9-CM: 789.07  4/4/2018 - Present        Other constipation ICD-10-CM: K59.09  ICD-9-CM: 564.09  4/4/2018 - Present        Other ascites ICD-10-CM: R18.8  ICD-9-CM: 789.59  4/4/2018 - Present        Peritonitis ([facility] ICD-10-CM: K65.9  ICD-9-CM: 567.9  3/11/2018 - Present        History of pulmonary embolism ICD-10-CM: A59.405  ICD-9-CM: V12.55  12/8/2017 - Present        Hypomagnesemia ICD-10-CM: E83.42  ICD-9-CM: 275.2  10/30/2017 - Present        Hypocalcemia ICD-10-CM: E83.51  ICD-9-CM: 275.41  10/30/2017 - Present        Hypokalemia ICD-10-CM: E87.6  ICD-9-CM: 276.8  10/27/2017 - Present        Hypertension (Chronic) ICD-10-CM: I10  ICD-9-CM: 401.9  10/14/2017 - Present        Chronic bilateral low back pain without sciatica ICD-10-CM: M54.5, G89.29  ICD-9-CM: 724.2, 338.29  5/26/2017 - Present        Anemia of renal disease ICD-10-CM: D63.1  ICD-9-CM: 285.21  2/21/2017 - Present        Dependence on peritoneal dialysis [facility]) ICD-10-CM: Z99.2  ICD-9-CM: V45.11  2/21/2017 - Present        ACP (advance care planning) ICD-10-CM: Z71.89  ICD-9-CM: V65.49  2/21/2017 - Present        ESRD on peritoneal dialysis ([facility] (Chronic) ICD-10-CM: N18.6, Z99.2  ICD-9-CM: 585.6, V45.11  10/7/2016 - Present        Malignant hypertension ICD-10-CM: I10  ICD-9-CM: 401.0  7/11/2016 - Present        Encounter for monitoring opioid maintenance therapy ICD-10-CM: Z51.81, Z79.891  ICD-9-CM: V58.83, V58.69  11/3/2015 - Present        Lupus nephritis ([facility] ICD-10-CM: M32.14  ICD-9-CM: 710.0, 583.81  3/10/2012 - Present        Lupus ICD-10-CM: L93.0  ICD-9-CM: 695.4  2/27/2012 - Present        RESOLVED: Sepsis ([facility] ICD-10-CM: A41.9  ICD-9-CM: 038.9, 995.91  12/12/2017 - 12/22/2017        RESOLVED: Pneumonia ICD-10-CM: J18.9  ICD-9-CM: 009  10/14/2017 - 12/8/2017        RESOLVED: Pleural effusion, left ICD-10-CM: J90  ICD-9-CM: 511.9  10/14/2017 - 12/8/2017        RESOLVED: Idiopathic chronic inflammatory bowel disease ICD-10-CM: R84.14  ICD-9-CM: 558.9  8/28/2013 - 8/28/2013        RESOLVED: Abdominal pain ICD-10-CM: R10.9  ICD-9-CM: 789.00  8/28/2013 - 9/3/2013        RESOLVED: Hypoxia ICD-10-CM: R09.02  ICD-9-CM: 799.02  4/25/2013 - 4/30/2013        RESOLVED: Lupus nephritis ([facility] ICD-10-CM: M32.14  ICD-9-CM: 710.0, 583.81  4/27/2012 - 4/28/2012              Time spent on discharge related activities today greater than 30 minutes.       Signed:  Sergey Wynne MD                 Hospitalist, Internal Medicine      Cc: Kamran Macias NP

## 2018-04-30 NOTE — INTERDISCIPLINARY ROUNDS
IDR/SLIDR Summary          Patient: Benjamin Dear MRN: [id number]    Age: 29 y.o. YOB: 1985 Room/Bed: [facility]   Admit Diagnosis: Sepsis ([address]  Principal Diagnosis: <principal problem not specified>   Goals: safety, ABX  Readmission: NO  Quality Measure: PNA  VTE Prophylaxis: Mechanical  Influenza Vaccine screening completed? YES  Pneumococcal Vaccine screening completed? YES  Mobility needs: No   Nutrition plan:No  Consults:P.T, O.T., Respiratory and Case Management    Financial concerns:Yes  Escalated to CM? YES  RRAT Score: 23   Interventions:  Testing due for pt today?  YES  LOS: 1 days Expected length of stay 5-7 days  Discharge plan: TBD   PCP: Holly Seth NP  Transportation needs: No    Days before discharge:two or more days before discharge   Discharge disposition: TBD    Signed:     Shell Craig RN  4/30/2018

## 2018-04-30 NOTE — DISCHARGE INSTRUCTIONS
Please bring this form with you to show your primary care provider at your follow-up appointment. Primary care provider:  Dr. Morena Husain NP    Discharging provider:  Kristin Mcpherson MD    You have been admitted to the hospital with the following diagnoses:  - Volume overload  - Atelectasis    FOLLOW-UP CARE RECOMMENDATIONS:    APPOINTMENTS:  Follow-up Information     Follow up With Details Comments Rajesh Colorado NP In 2 weeks Discharge follow up  Luisa Mehta [address]      Tasneem Castillo MD  at your appointment in AM at [address] [place] [address]  [phone]      your HD  T-Th-Sat             FOLLOW-UP TESTS recommended:   - Levaquin 500mg every 48 hrs,take after Dialysis     PENDING TEST RESULTS:  At the time of your discharge the following test results are still pending: NONE  Please make sure you review these results with your outpatient follow-up provider(s). SYMPTOMS to watch for: chest pain, shortness of breath, fever, chills, nausea, vomiting, diarrhea, change in mentation, falling, weakness, bleeding. DIET/what to eat:  Renal Diet    ACTIVITY:  Activity as tolerated    WOUND CARE: NONE    EQUIPMENT needed:  NONE      What to do if new or unexpected symptoms occur? If you experience any of the above symptoms (or should other concerns or questions arise after discharge) please call your primary care physician. Return to the emergency room if you cannot get hold of your doctor. · It is very important that you keep your follow-up appointment(s). · Please bring discharge papers, medication list (and/or medication bottles) to your follow-up appointments for review by your outpatient provider(s). · Please check the list of medications and be sure it includes every medication (even non-prescription medications) that your provider wants you to take.     · It is important that you take the medication exactly as they are prescribed. · Keep your medication in the bottles provided by the pharmacist and keep a list of the medication names, dosages, and times to be taken in your wallet. · Do not take other medications without consulting your doctor. · If you have any questions about your medications or other instructions, please talk to your nurse or care provider before you leave the hospital.    I understand that if any problems occur once I am at home I am to contact my physician. These instructions were explained to me and I had the opportunity to ask questions.

## 2018-04-30 NOTE — PROGRESS NOTES
Outbound call to patient to follow-up on status. NN was able to speak with patient who verified self by 3 identifiers. Patient sates she is doing well, verbalized understanding of discharge instructions. Was seen by Nagi Ibanez on 4/23/18. Patient stated she had to cancel her home health visit due to her dialysis schedule. Her initial [employer] visit was by [facility] on 4/23/18. NN reviewed appointment schedule with patient, who verbalized understanding. Next PCp visit is scheduled for 5/2/18. Appointment re-scheduled due to patient's dialysis visit on 4/24/18. NN asked patient to have [address] check her drain during next visit, patient denies concerns at this time. Medication review completed. .  No current facility-administered medications for this visit. Current Outpatient Prescriptions   Medication Sig    HYDROmorphone (DILAUDID) 4 mg tablet Take 4 mg by mouth every four (4) hours as needed for Pain. prn for pain    amitriptyline (ELAVIL) 25 mg tablet Take 25 mg by mouth nightly as needed for Sleep.      Facility-Administered Medications Ordered in Other Visits   Medication Dose Route Frequency    sodium chloride (NS) flush 5-10 mL  5-10 mL IntraVENous Q8H    sodium chloride (NS) flush 5-10 mL  5-10 mL IntraVENous PRN    acetaminophen (TYLENOL) tablet 650 mg  650 mg Oral Q4H PRN    ondansetron (ZOFRAN) injection 4 mg  4 mg IntraVENous Q4H PRN    carvedilol (COREG) tablet 12.5 mg  12.5 mg Oral BID WITH MEALS    hydroxychloroquine (PLAQUENIL) tablet 200 mg  200 mg Oral BID    amLODIPine (NORVASC) tablet 10 mg  10 mg Oral DAILY    apixaban (ELIQUIS) tablet 5 mg  5 mg Oral Q12H    predniSONE (DELTASONE) tablet 5 mg  5 mg Oral DAILY    allopurinol (ZYLOPRIM) tablet 100 mg  100 mg Oral DAILY AFTER BREAKFAST    azaTHIOprine (IMURAN) tablet 50 mg  50 mg Oral DAILY AFTER BREAKFAST    polyethylene glycol (MIRALAX) packet 17 g  17 g Oral DAILY    senna (SENOKOT) tablet 8.6 mg  1 Tab Oral DAILY PRN    gemfibrozil (LOPID) tablet 300 mg  300 mg Oral DAILY    pantoprazole (PROTONIX) tablet 40 mg  40 mg Oral ACB    amitriptyline (ELAVIL) tablet 25 mg  25 mg Oral QHS    cyanocobalamin (VITAMIN B12) tablet 2,500 mcg  2,500 mcg Oral DAILY    vancomycin (VANCOCIN) 500 mg in 0.9% sodium chloride (MBP/ADV) 100 mL  500 mg IntraVENous DIALYSIS PRN    [START ON 5/1/2018] levoFLOXacin (LEVAQUIN) 500 mg in D5W IVPB  500 mg IntraVENous Q48H    albuterol (PROVENTIL VENTOLIN) nebulizer solution 2.5 mg  2.5 mg Nebulization Q4H PRN    HYDROmorphone (DILAUDID) injection 0.5 mg  0.5 mg IntraVENous Q4H PRN    VANCOMYCIN INFORMATION NOTE   Other Rx Dosing/Monitoring    hydrALAZINE (APRESOLINE) 20 mg/mL injection 20 mg  20 mg IntraVENous Q4H PRN    arformoterol (BROVANA) neb solution 15 mcg  15 mcg Nebulization BID RT    And    budesonide (PULMICORT) 500 mcg/2 ml nebulizer suspension  500 mcg Nebulization BID RT    piperacillin-tazobactam (ZOSYN) 3.375 g in 0.9% sodium chloride (MBP/ADV) 100 mL  3.375 g IntraVENous Q12H     Case management Plan:  NN will continue to attempt contacts with patient by telephone or during office visit within next 7-10 days. Will continue to follow as necessary for the remaining 30 days post visit and will reassess to see if CCM assessment is needed before discharge.

## 2018-04-30 NOTE — PROGRESS NOTES
Hospital PCP SURJIT follow-up appointment with Dr. Cruz Loya on Wednesday May 2,2018 @ 11:45 a.m.  Added to AVS.   Emeterio Wilson CM Specialist

## 2018-04-30 NOTE — PROGRESS NOTES
Bedside and Verbal shift change report given to Kimberly Loving RN (oncoming nurse) by Juany Mukherjee RN (offgoing nurse). Report included the following information SBAR, Kardex, Intake/Output, MAR, Recent Results and Cardiac Rhythm NSR/ST.

## 2018-04-30 NOTE — ROUTINE PROCESS
Bedside RN performed patient education and medication education. Discharge concerns initiated and discussed with patient, including clarification on \"who\" assists the patient at their home and instructions for when the home going patient should call their provider after discharge. Opportunity for questions and clarification was provided. Patient receptive to education: YES  Patient stated: \"I have an appointment tomorrow\"  Barriers to Education: none  Diagnosis Education given:  YES    Length of stay: 1  Expected Day of Discharge: 1  Ask if they have \"Help at Home\" & add to white board?   YES    Education Day #: 1    Medication Education Given:  YES  M in the box Medication name: levaquin and hydromorphone    Pt aware of HCAHPS survey: YES

## 2018-04-30 NOTE — PROGRESS NOTES
Problem: Falls - Risk of  Goal: *Absence of Falls  Document Alex Fall Risk and appropriate interventions in the flowsheet. Outcome: Progressing Towards Goal  Fall Risk Interventions:  Mobility Interventions: Communicate number of staff needed for ambulation/transfer, Patient to call before getting OOB, PT Consult for mobility concerns                   History of Falls Interventions: Consult care management for discharge planning, Door open when patient unattended        Problem: Pressure Injury - Risk of  Goal: *Prevention of pressure injury  Document Vkiash Scale and appropriate interventions in the flowsheet. Outcome: Progressing Towards Goal  Pressure Injury Interventions:             Activity Interventions: Increase time out of bed, PT/OT evaluation, Pressure redistribution bed/mattress(bed type)         Nutrition Interventions: Document food/fluid/supplement intake

## 2018-04-30 NOTE — PROGRESS NOTES
RENAL  PROGRESS NOTE        Subjective:    COMPLAINT:had HD yesterday with 4 liters of UF    Objective:   VITALS SIGNS:    Visit Vitals    /90 (BP 1 Location: Left arm, BP Patient Position: At rest)    Pulse (!) 103    Temp 98.2 °F (36.8 °C)    Resp 12    Ht 5' 5\" (1.651 m)    Wt 68.6 kg (151 lb 3.8 oz)    LMP 2017 (Approximate)    SpO2 93%    BMI 25.17 kg/m2       O2 Device: Room air   O2 Flow Rate (L/min): 2 l/min   Temp (24hrs), Av.6 °F (37 °C), Min:98 °F (36.7 °C), Max:99.4 °F (37.4 °C)         PHYSICAL EXAM:  NAD  No edema    DATA REVIEW:     INTAKE / OUTPUT:   Last shift:         Last 3 shifts:  1901 -  0700  In: -   Out: 4000     Intake/Output Summary (Last 24 hours) at 18 0909  Last data filed at 18 1605   Gross per 24 hour   Intake                0 ml   Output             4000 ml   Net            -4000 ml         LABS:   Recent Labs      18   0325  18   0825   WBC  3.9  4.8   HGB  8.7*  9.0*   HCT  30.0*  30.7*   PLT  201  226     Recent Labs      18   0325  18   0825   NA  138  141   K  4.5  4.4   CL  100  102   CO2  31  31   GLU  77  81   BUN  29*  25*   CREA  4.63*  4.01*   CA  8.4*  8.1*   ALB   --   1.6*   TBILI   --   0.3   SGOT   --   48*   ALT   --   26           Assessment:     Acute resp failure,volume overload  ESRD-T//S-WH  Yeast peritonitis with pd drain  Anemia  SLE  PE on eliquis  HTN      Plan:   HD in AM  PD drain to be removed   will follow  Mayte Sheikh MD

## 2018-04-30 NOTE — PROGRESS NOTES
Primary Nurse Whitney Friedman and Hulon Mortimer, RN performed a dual skin assessment on this patient No impairment noted  Vikash score is 22    Pt has RUE HD fistula that is RUTH, clean, dry intact and an JENNIFER drain in LL abdomen. Pt has small abrasion R elbow.

## 2018-04-30 NOTE — PHYSICIAN ADVISORY
Short Stay Review       Pt Name:  Wilberto Mera   MR#  [id number]   CSN#   [id number]   [address] [place] [address]  669/01  @ [facility]   Hospitalization date  4/29/2018  8:04 AM  No discharge date for patient encounter. Current Attending Physician  Joycelyn Craig MD     A discharge order has been placed for this episode of hospital care for Ms. Wilberto Mera; since this hospital stay is less than two midnights, I reviewed Ms. Wilberto Mera chart. Ms. Wilberto Mera healthcare insurance/benefit include:  Payor: [place] MED[place]ARE / Plan: Kuamr Ribeiro / Product Type: Medicare /     Utilization Review related case summary:   Age  29 y.o.   BMI  Body mass index is 25.17 kg/(m^2). Functional status ASA Class  N/A    PMHx includes  SLE, ESRD on HD etc.   She was found to have acute resp failure due to volume overload. Her condition improved after removal of about 4L of fluid via Hd.     Risk of deterioration  High             On the basis of chart review, this patient's hospitalization status      is appropriate for Yaneth Rodriguez MD MPH FACP   Physician Advisor    [address]   Utilization Review, Care Management         CSN:  [id number]   HALEY:   [id number]  Admitted on :  4/29/2018   Discharge order

## 2018-04-30 NOTE — PROGRESS NOTES
CM met with patient to discuss discharge planning. Patient was last here 3/11/18-4/20/18. Returned to ER by EMS due to Fever and SOB. Patient is going to go for her dialysis tomorrow. Patient states that her father is coming to pick her up upon discharge. Patient was started on IV abx. And will be switched to P.O. Patient lives with her father, mother, brother, and her 6 y.o. Daughter. House is one story and has a ramp to inside. Patient states she has not had any falls but does use a walker. Patient last saw her PCP in January 2018. Prescriptions are filled at [facility]. Insurance: Medicare A&B and [facility] Medicaid. Patient is already receiving HH/PT/OT with [facility]. Patient states mom will transport patient home. Reason for Admission:  Sepsis               RRAT Score:  23                Resources/supports as identified by patient/family:   Supportive family, Has [facility]                Top Challenges facing patient (as identified by patient/family and CM): Patient has been in the hospital 40 days and then readmitted 9 days after discharge. Finances/Medication cost?   Lives with parents, Medicare A&B and [facility] Medicaid /[facility]               Transportation? Family              Support system or lack thereof? Family                     Living arrangements? Lives with parents               Self-care/ADLs/Cognition? Able to do ADL's          Current Advanced Directive/Advance Care Plan:  No paperwork was given to patient to take to her physician and have him explain and fill out papers. Plan for utilizing home health:   Yes. [facility] Home [facility] / Skilled Nursing                      Likelihood of readmission: High                 Transition of Care Plan:  Home with [facility]/PT/OT    Care Management Interventions  PCP Verified by CM: Yes Raghu Valdes NP)  Last Visit to PCP: 01/11/18  Mode of Transport at Discharge:  Other (see comment) (Car)  Transition of Care Consult (CM Consult): Discharge Planning  MyChart Signup: No  Discharge Durable Medical Equipment: No (Patient has a walker)  Physical Therapy Consult: No  Occupational Therapy Consult: No  Speech Therapy Consult: No  Current Support Network: Lives with Caregiver (Lives with mom,dad, brother and 6 y.o. daughter.)  Confirm Follow Up Transport: Family (Mom or Dad)  Plan discussed with Pt/Family/Caregiver: Yes (Patient)  Discharge Location  Discharge Placement: Home (Has dialysis T/Th/Sat.)     Luz Maria Mo RN CRM

## 2018-05-01 ENCOUNTER — PATIENT OUTREACH (OUTPATIENT)
Dept: FAMILY MEDICINE CLINIC | Age: 33
End: 2018-05-01

## 2018-05-01 ENCOUNTER — HOME CARE VISIT (OUTPATIENT)
Dept: HOME HEALTH SERVICES | Facility: HOME HEALTH | Age: 33
End: 2018-05-01
Payer: MEDICARE

## 2018-05-01 LAB
BACTERIA SPEC CULT: ABNORMAL
GRAM STN SPEC: ABNORMAL
SERVICE CMNT-IMP: ABNORMAL

## 2018-05-01 PROCEDURE — 3331090002 HH PPS REVENUE DEBIT

## 2018-05-01 PROCEDURE — 3331090001 HH PPS REVENUE CREDIT

## 2018-05-01 NOTE — PROGRESS NOTES
Outbound call to patient several times at several different numbers. NN unable to reach for discharge assessment. Patient admitted to [address] [place] [address] x 32 hours for shortness of breath. Patient stated to triage that she had crabs on Sunday and then noticed SOB. Patient treated for Hypoxia due to Volume Overload. Underwent Ultra filtration with 4 liters of fluid removal. Verbalized being much better afterwards. Patient was discharged home on 4/29/18 after being educated on importance of diet restrictions with renal disease. Message left on VM (cell) for patient to return call for discharge assessment. Patient has dialysis on [facility], most likely in dialysis on today. Patient has follow-up scheduled for 5/2/18 with PCP. [facility] also following patient at home for Skilled care. Initial visit on 4/30/18. Pertinent imaging studies:  CTA Chest: IMPRESSION:      1. Bilateral pleural effusions with patchy ill-defined airspace opacities  bilaterally. No acute pulmonary embolus. Patient discharged on Levaquin 500mg every 48 hrs, take after Dialysis. JENNIFER drain remain at abdomen. .  Current Outpatient Prescriptions   Medication Sig    apixaban (ELIQUIS) 5 mg tablet Take 1 Tab by mouth every twelve (12) hours.  HYDROmorphone (DILAUDID) 4 mg tablet Take 1 Tab by mouth every four (4) hours as needed. Max Daily Amount: 24 mg.    levoFLOXacin (LEVAQUIN) 500 mg tablet Take 1 Tab by mouth every fourty-eight (48) hours. Take after HD    amitriptyline (ELAVIL) 25 mg tablet Take 25 mg by mouth nightly as needed for Sleep.  amLODIPine (NORVASC) 5 mg tablet Take 1 Tab by mouth daily. (Patient taking differently: Take 10 mg by mouth daily.)    polyethylene glycol (MIRALAX) 17 gram packet Take 1 Packet by mouth daily.  amitriptyline (ELAVIL) 25 mg tablet Take 1 Tab by mouth nightly. PRN (Patient taking differently: Take 1 Tab by mouth nightly.  rewritten for correctness  Indications: sleep)    predniSONE (DELTASONE) 5 mg tablet Take 5 mg by mouth daily.  fluticasone-vilanterol (BREO ELLIPTA) 200-25 mcg/dose inhaler Take 1 Puff by inhalation daily. Rinse mouth out after use    gemfibrozil (LOPID) 600 mg tablet Take 300 mg by mouth daily.  carvedilol (COREG) 6.25 mg tablet Take 12.5 mg by mouth two (2) times daily (with meals).  azaTHIOprine (IMURAN) 50 mg tablet Take 50 mg by mouth daily (after breakfast).  albuterol (PROVENTIL HFA, VENTOLIN HFA, PROAIR HFA) 90 mcg/actuation inhaler Take 1-2 Puffs by inhalation every four (4) hours as needed for Wheezing or Shortness of Breath.  hydroxychloroquine (PLAQUENIL) 200 mg tablet Take 200 mg by mouth two (2) times a day.  allopurinol (ZYLOPRIM) 100 mg tablet Take 100 mg by mouth daily (after breakfast). Needs to TAKE on a full stomach; will cause her to be nauseated.  cyanocobalamin (VITAMIN B-12) 2,500 mcg sublingual tablet Take 1 Tab by mouth daily.  pantoprazole (PROTONIX) 40 mg tablet Take 1 Tab by mouth Daily (before breakfast).  senna (SENNA) 8.6 mg tablet Take 1 Tab by mouth daily as needed. No current facility-administered medications for this visit. Case management Plan:  NN will continue to attempt contacts with patient by telephone or during office visit within next 7-10 days. Will continue to follow as necessary for the remaining 30 days post visit and will reassess to see if CCM assessment is needed before discharge.

## 2018-05-02 PROCEDURE — 3331090002 HH PPS REVENUE DEBIT

## 2018-05-02 PROCEDURE — 3331090001 HH PPS REVENUE CREDIT

## 2018-05-03 PROCEDURE — 3331090001 HH PPS REVENUE CREDIT

## 2018-05-03 PROCEDURE — 3331090002 HH PPS REVENUE DEBIT

## 2018-05-04 ENCOUNTER — HOME CARE VISIT (OUTPATIENT)
Dept: SCHEDULING | Facility: HOME HEALTH | Age: 33
End: 2018-05-04
Payer: MEDICARE

## 2018-05-04 ENCOUNTER — HOME CARE VISIT (OUTPATIENT)
Dept: HOME HEALTH SERVICES | Facility: HOME HEALTH | Age: 33
End: 2018-05-04
Payer: MEDICARE

## 2018-05-04 VITALS — RESPIRATION RATE: 16 BRPM | HEART RATE: 99 BPM | TEMPERATURE: 99.3 F | OXYGEN SATURATION: 98 %

## 2018-05-04 LAB
BACTERIA SPEC CULT: NORMAL
SERVICE CMNT-IMP: NORMAL

## 2018-05-04 PROCEDURE — G0493 RN CARE EA 15 MIN HH/HOSPICE: HCPCS

## 2018-05-04 PROCEDURE — 3331090002 HH PPS REVENUE DEBIT

## 2018-05-04 PROCEDURE — 3331090001 HH PPS REVENUE CREDIT

## 2018-05-05 PROCEDURE — 3331090002 HH PPS REVENUE DEBIT

## 2018-05-05 PROCEDURE — 3331090001 HH PPS REVENUE CREDIT

## 2018-05-06 PROCEDURE — 3331090002 HH PPS REVENUE DEBIT

## 2018-05-06 PROCEDURE — 3331090001 HH PPS REVENUE CREDIT

## 2018-05-07 ENCOUNTER — OFFICE VISIT (OUTPATIENT)
Dept: SURGERY | Age: 33
End: 2018-05-07

## 2018-05-07 ENCOUNTER — HOME CARE VISIT (OUTPATIENT)
Dept: HOME HEALTH SERVICES | Facility: HOME HEALTH | Age: 33
End: 2018-05-07
Payer: MEDICARE

## 2018-05-07 ENCOUNTER — HOME CARE VISIT (OUTPATIENT)
Dept: SCHEDULING | Facility: HOME HEALTH | Age: 33
End: 2018-05-07
Payer: MEDICARE

## 2018-05-07 VITALS
WEIGHT: 144.5 LBS | HEART RATE: 90 BPM | OXYGEN SATURATION: 92 % | HEIGHT: 65 IN | TEMPERATURE: 99.6 F | SYSTOLIC BLOOD PRESSURE: 140 MMHG | DIASTOLIC BLOOD PRESSURE: 86 MMHG | RESPIRATION RATE: 20 BRPM | BODY MASS INDEX: 24.07 KG/M2

## 2018-05-07 VITALS
SYSTOLIC BLOOD PRESSURE: 155 MMHG | TEMPERATURE: 99 F | OXYGEN SATURATION: 92 % | RESPIRATION RATE: 16 BRPM | DIASTOLIC BLOOD PRESSURE: 110 MMHG | HEART RATE: 102 BPM

## 2018-05-07 DIAGNOSIS — Z09 FOLLOW-UP EXAMINATION FOLLOWING SURGERY: Primary | ICD-10-CM

## 2018-05-07 PROCEDURE — 3331090002 HH PPS REVENUE DEBIT

## 2018-05-07 PROCEDURE — G0152 HHCP-SERV OF OT,EA 15 MIN: HCPCS

## 2018-05-07 PROCEDURE — 3331090001 HH PPS REVENUE CREDIT

## 2018-05-08 ENCOUNTER — TELEPHONE (OUTPATIENT)
Dept: FAMILY MEDICINE CLINIC | Age: 33
End: 2018-05-08

## 2018-05-08 PROCEDURE — 3331090002 HH PPS REVENUE DEBIT

## 2018-05-08 PROCEDURE — 3331090001 HH PPS REVENUE CREDIT

## 2018-05-08 NOTE — TELEPHONE ENCOUNTER
[phone] attempted to call Audi Sharif no answer left message on her private VM that [address] orders were received Dr Naeem Valdes will sign when he gets here tomorrow

## 2018-05-08 NOTE — TELEPHONE ENCOUNTER
Bill Allen is calling from [facility] on behalf of patient in regards to order that was faxed over on 5/3/18 which was the plan of care along with two other orders that was faxed on 5/8/18 for patient to receive occupational therapy. She is requesting a call back with conformation that the orders have been received.      Best call back [phone]

## 2018-05-09 ENCOUNTER — HOME CARE VISIT (OUTPATIENT)
Dept: SCHEDULING | Facility: HOME HEALTH | Age: 33
End: 2018-05-09
Payer: MEDICARE

## 2018-05-09 VITALS
BODY MASS INDEX: 23.96 KG/M2 | SYSTOLIC BLOOD PRESSURE: 130 MMHG | TEMPERATURE: 99.4 F | RESPIRATION RATE: 17 BRPM | WEIGHT: 144 LBS | HEART RATE: 108 BPM | DIASTOLIC BLOOD PRESSURE: 86 MMHG | OXYGEN SATURATION: 97 %

## 2018-05-09 VITALS
OXYGEN SATURATION: 97 % | RESPIRATION RATE: 17 BRPM | SYSTOLIC BLOOD PRESSURE: 136 MMHG | DIASTOLIC BLOOD PRESSURE: 83 MMHG | HEART RATE: 83 BPM | TEMPERATURE: 98 F

## 2018-05-09 VITALS
HEART RATE: 100 BPM | RESPIRATION RATE: 18 BRPM | TEMPERATURE: 99 F | DIASTOLIC BLOOD PRESSURE: 80 MMHG | SYSTOLIC BLOOD PRESSURE: 136 MMHG | OXYGEN SATURATION: 95 %

## 2018-05-09 PROCEDURE — G0299 HHS/HOSPICE OF RN EA 15 MIN: HCPCS

## 2018-05-09 PROCEDURE — 3331090002 HH PPS REVENUE DEBIT

## 2018-05-09 PROCEDURE — G0151 HHCP-SERV OF PT,EA 15 MIN: HCPCS

## 2018-05-09 PROCEDURE — G0152 HHCP-SERV OF OT,EA 15 MIN: HCPCS

## 2018-05-09 PROCEDURE — 3331090001 HH PPS REVENUE CREDIT

## 2018-05-10 PROCEDURE — 3331090002 HH PPS REVENUE DEBIT

## 2018-05-10 PROCEDURE — 3331090001 HH PPS REVENUE CREDIT

## 2018-05-11 PROCEDURE — 3331090002 HH PPS REVENUE DEBIT

## 2018-05-11 PROCEDURE — 3331090001 HH PPS REVENUE CREDIT

## 2018-05-12 ENCOUNTER — APPOINTMENT (OUTPATIENT)
Dept: GENERAL RADIOLOGY | Age: 33
DRG: 871 | End: 2018-05-12
Attending: NURSE PRACTITIONER
Payer: MEDICARE

## 2018-05-12 ENCOUNTER — HOSPITAL ENCOUNTER (INPATIENT)
Age: 33
LOS: 6 days | Discharge: HOME | DRG: 871 | End: 2018-05-18
Attending: EMERGENCY MEDICINE | Admitting: INTERNAL MEDICINE
Payer: MEDICARE

## 2018-05-12 ENCOUNTER — APPOINTMENT (OUTPATIENT)
Dept: CT IMAGING | Age: 33
DRG: 871 | End: 2018-05-12
Attending: NURSE PRACTITIONER
Payer: MEDICARE

## 2018-05-12 DIAGNOSIS — K65.1 ABSCESS OF ABDOMINAL CAVITY (HCC): Primary | ICD-10-CM

## 2018-05-12 DIAGNOSIS — A41.9 SEPSIS, DUE TO UNSPECIFIED ORGANISM: ICD-10-CM

## 2018-05-12 LAB
ALBUMIN SERPL-MCNC: 2 G/DL (ref 3.5–5)
ALBUMIN/GLOB SERPL: 0.4 {RATIO} (ref 1.1–2.2)
ALP SERPL-CCNC: 116 U/L (ref 45–117)
ALT SERPL-CCNC: 11 U/L (ref 12–78)
ANION GAP SERPL CALC-SCNC: 8 MMOL/L (ref 5–15)
AST SERPL-CCNC: 28 U/L (ref 15–37)
BASOPHILS # BLD: 0 K/UL (ref 0–0.1)
BASOPHILS NFR BLD: 1 % (ref 0–1)
BILIRUB SERPL-MCNC: 0.3 MG/DL (ref 0.2–1)
BUN SERPL-MCNC: 30 MG/DL (ref 6–20)
BUN/CREAT SERPL: 5 (ref 12–20)
CALCIUM SERPL-MCNC: 8.4 MG/DL (ref 8.5–10.1)
CHLORIDE SERPL-SCNC: 99 MMOL/L (ref 97–108)
CO2 SERPL-SCNC: 31 MMOL/L (ref 21–32)
CREAT SERPL-MCNC: 5.93 MG/DL (ref 0.55–1.02)
DIFFERENTIAL METHOD BLD: ABNORMAL
EOSINOPHIL # BLD: 0.1 K/UL (ref 0–0.4)
EOSINOPHIL NFR BLD: 2 % (ref 0–7)
ERYTHROCYTE [DISTWIDTH] IN BLOOD BY AUTOMATED COUNT: 15.1 % (ref 11.5–14.5)
GLOBULIN SER CALC-MCNC: 4.8 G/DL (ref 2–4)
GLUCOSE SERPL-MCNC: 79 MG/DL (ref 65–100)
HCT VFR BLD AUTO: 31.9 % (ref 35–47)
HGB BLD-MCNC: 9.5 G/DL (ref 11.5–16)
IMM GRANULOCYTES # BLD: 0 K/UL (ref 0–0.04)
IMM GRANULOCYTES NFR BLD AUTO: 0 % (ref 0–0.5)
LACTATE SERPL-SCNC: 1 MMOL/L (ref 0.4–2)
LIPASE SERPL-CCNC: 229 U/L (ref 73–393)
LYMPHOCYTES # BLD: 0.6 K/UL (ref 0.8–3.5)
LYMPHOCYTES NFR BLD: 21 % (ref 12–49)
MCH RBC QN AUTO: 27.3 PG (ref 26–34)
MCHC RBC AUTO-ENTMCNC: 29.8 G/DL (ref 30–36.5)
MCV RBC AUTO: 91.7 FL (ref 80–99)
MONOCYTES # BLD: 0.1 K/UL (ref 0–1)
MONOCYTES NFR BLD: 3 % (ref 5–13)
NEUTS SEG # BLD: 2.2 K/UL (ref 1.8–8)
NEUTS SEG NFR BLD: 73 % (ref 32–75)
NRBC # BLD: 0 K/UL (ref 0–0.01)
NRBC BLD-RTO: 0 PER 100 WBC
PLATELET # BLD AUTO: 282 K/UL (ref 150–400)
PLATELET COMMENTS,PCOM: ABNORMAL
PMV BLD AUTO: 10.2 FL (ref 8.9–12.9)
POTASSIUM SERPL-SCNC: 4.6 MMOL/L (ref 3.5–5.1)
PROT SERPL-MCNC: 6.8 G/DL (ref 6.4–8.2)
RBC # BLD AUTO: 3.48 M/UL (ref 3.8–5.2)
RBC MORPH BLD: ABNORMAL
SODIUM SERPL-SCNC: 138 MMOL/L (ref 136–145)
WBC # BLD AUTO: 3 K/UL (ref 3.6–11)

## 2018-05-12 PROCEDURE — 96374 THER/PROPH/DIAG INJ IV PUSH: CPT

## 2018-05-12 PROCEDURE — 71045 X-RAY EXAM CHEST 1 VIEW: CPT

## 2018-05-12 PROCEDURE — 74011250637 HC RX REV CODE- 250/637: Performed by: INTERNAL MEDICINE

## 2018-05-12 PROCEDURE — 85025 COMPLETE CBC W/AUTO DIFF WBC: CPT | Performed by: NURSE PRACTITIONER

## 2018-05-12 PROCEDURE — 74176 CT ABD & PELVIS W/O CONTRAST: CPT

## 2018-05-12 PROCEDURE — 74011000250 HC RX REV CODE- 250: Performed by: INTERNAL MEDICINE

## 2018-05-12 PROCEDURE — 74011250636 HC RX REV CODE- 250/636: Performed by: EMERGENCY MEDICINE

## 2018-05-12 PROCEDURE — 65660000000 HC RM CCU STEPDOWN

## 2018-05-12 PROCEDURE — 36415 COLL VENOUS BLD VENIPUNCTURE: CPT | Performed by: NURSE PRACTITIONER

## 2018-05-12 PROCEDURE — 83605 ASSAY OF LACTIC ACID: CPT | Performed by: NURSE PRACTITIONER

## 2018-05-12 PROCEDURE — 3331090002 HH PPS REVENUE DEBIT

## 2018-05-12 PROCEDURE — 96375 TX/PRO/DX INJ NEW DRUG ADDON: CPT

## 2018-05-12 PROCEDURE — 94640 AIRWAY INHALATION TREATMENT: CPT

## 2018-05-12 PROCEDURE — 74011250636 HC RX REV CODE- 250/636: Performed by: INTERNAL MEDICINE

## 2018-05-12 PROCEDURE — 80053 COMPREHEN METABOLIC PANEL: CPT | Performed by: NURSE PRACTITIONER

## 2018-05-12 PROCEDURE — 3331090001 HH PPS REVENUE CREDIT

## 2018-05-12 PROCEDURE — 83690 ASSAY OF LIPASE: CPT | Performed by: NURSE PRACTITIONER

## 2018-05-12 PROCEDURE — 99285 EMERGENCY DEPT VISIT HI MDM: CPT

## 2018-05-12 PROCEDURE — 94664 DEMO&/EVAL PT USE INHALER: CPT

## 2018-05-12 PROCEDURE — 87040 BLOOD CULTURE FOR BACTERIA: CPT | Performed by: NURSE PRACTITIONER

## 2018-05-12 PROCEDURE — 77010033678 HC OXYGEN DAILY

## 2018-05-12 PROCEDURE — 74011000258 HC RX REV CODE- 258: Performed by: EMERGENCY MEDICINE

## 2018-05-12 RX ORDER — POLYETHYLENE GLYCOL 3350 17 G/17G
17 POWDER, FOR SOLUTION ORAL DAILY
Status: DISCONTINUED | OUTPATIENT
Start: 2018-05-13 | End: 2018-05-18 | Stop reason: HOSPADM

## 2018-05-12 RX ORDER — FACIAL-BODY WIPES
10 EACH TOPICAL DAILY PRN
Status: DISCONTINUED | OUTPATIENT
Start: 2018-05-12 | End: 2018-05-18 | Stop reason: HOSPADM

## 2018-05-12 RX ORDER — ALBUTEROL SULFATE 90 UG/1
1-2 AEROSOL, METERED RESPIRATORY (INHALATION)
Status: DISCONTINUED | OUTPATIENT
Start: 2018-05-12 | End: 2018-05-12 | Stop reason: CLARIF

## 2018-05-12 RX ORDER — HYDROCODONE BITARTRATE AND ACETAMINOPHEN 10; 325 MG/1; MG/1
1 TABLET ORAL
Status: DISCONTINUED | OUTPATIENT
Start: 2018-05-12 | End: 2018-05-18 | Stop reason: HOSPADM

## 2018-05-12 RX ORDER — SENNOSIDES 8.6 MG/1
1 TABLET ORAL
Status: DISCONTINUED | OUTPATIENT
Start: 2018-05-12 | End: 2018-05-18 | Stop reason: HOSPADM

## 2018-05-12 RX ORDER — VANCOMYCIN HYDROCHLORIDE
1250 ONCE
Status: COMPLETED | OUTPATIENT
Start: 2018-05-12 | End: 2018-05-13

## 2018-05-12 RX ORDER — AMLODIPINE BESYLATE 5 MG/1
10 TABLET ORAL DAILY
Status: DISCONTINUED | OUTPATIENT
Start: 2018-05-12 | End: 2018-05-18 | Stop reason: HOSPADM

## 2018-05-12 RX ORDER — PREDNISONE 5 MG/1
5 TABLET ORAL DAILY
Status: DISCONTINUED | OUTPATIENT
Start: 2018-05-13 | End: 2018-05-18 | Stop reason: HOSPADM

## 2018-05-12 RX ORDER — AMLODIPINE BESYLATE 5 MG/1
10 TABLET ORAL DAILY
Status: DISCONTINUED | OUTPATIENT
Start: 2018-05-13 | End: 2018-05-12

## 2018-05-12 RX ORDER — BISACODYL 5 MG
5 TABLET, DELAYED RELEASE (ENTERIC COATED) ORAL DAILY PRN
Status: DISCONTINUED | OUTPATIENT
Start: 2018-05-12 | End: 2018-05-18 | Stop reason: HOSPADM

## 2018-05-12 RX ORDER — ONDANSETRON 2 MG/ML
4 INJECTION INTRAMUSCULAR; INTRAVENOUS
Status: DISCONTINUED | OUTPATIENT
Start: 2018-05-12 | End: 2018-05-18 | Stop reason: HOSPADM

## 2018-05-12 RX ORDER — FLUTICASONE PROPIONATE AND SALMETEROL 250; 50 UG/1; UG/1
1 POWDER RESPIRATORY (INHALATION)
Status: DISCONTINUED | OUTPATIENT
Start: 2018-05-12 | End: 2018-05-12 | Stop reason: CLARIF

## 2018-05-12 RX ORDER — ALLOPURINOL 100 MG/1
100 TABLET ORAL
Status: DISCONTINUED | OUTPATIENT
Start: 2018-05-13 | End: 2018-05-18 | Stop reason: HOSPADM

## 2018-05-12 RX ORDER — ALBUTEROL SULFATE 0.83 MG/ML
2.5 SOLUTION RESPIRATORY (INHALATION)
Status: DISCONTINUED | OUTPATIENT
Start: 2018-05-12 | End: 2018-05-18 | Stop reason: HOSPADM

## 2018-05-12 RX ORDER — ARFORMOTEROL TARTRATE 15 UG/2ML
15 SOLUTION RESPIRATORY (INHALATION)
Status: DISCONTINUED | OUTPATIENT
Start: 2018-05-12 | End: 2018-05-18 | Stop reason: HOSPADM

## 2018-05-12 RX ORDER — HYDROMORPHONE HYDROCHLORIDE 2 MG/ML
2 INJECTION, SOLUTION INTRAMUSCULAR; INTRAVENOUS; SUBCUTANEOUS
Status: DISCONTINUED | OUTPATIENT
Start: 2018-05-12 | End: 2018-05-13

## 2018-05-12 RX ORDER — ACETAMINOPHEN 325 MG/1
650 TABLET ORAL
Status: DISCONTINUED | OUTPATIENT
Start: 2018-05-12 | End: 2018-05-18 | Stop reason: HOSPADM

## 2018-05-12 RX ORDER — PANTOPRAZOLE SODIUM 40 MG/1
40 TABLET, DELAYED RELEASE ORAL
Status: DISCONTINUED | OUTPATIENT
Start: 2018-05-13 | End: 2018-05-18 | Stop reason: HOSPADM

## 2018-05-12 RX ORDER — GEMFIBROZIL 600 MG/1
300 TABLET, FILM COATED ORAL DAILY
Status: DISCONTINUED | OUTPATIENT
Start: 2018-05-13 | End: 2018-05-18 | Stop reason: HOSPADM

## 2018-05-12 RX ORDER — AMITRIPTYLINE HYDROCHLORIDE 50 MG/1
25 TABLET, FILM COATED ORAL
Status: DISCONTINUED | OUTPATIENT
Start: 2018-05-12 | End: 2018-05-18 | Stop reason: HOSPADM

## 2018-05-12 RX ORDER — FENTANYL CITRATE 50 UG/ML
25 INJECTION, SOLUTION INTRAMUSCULAR; INTRAVENOUS
Status: COMPLETED | OUTPATIENT
Start: 2018-05-12 | End: 2018-05-12

## 2018-05-12 RX ORDER — HYDRALAZINE HYDROCHLORIDE 20 MG/ML
10 INJECTION INTRAMUSCULAR; INTRAVENOUS
Status: DISCONTINUED | OUTPATIENT
Start: 2018-05-12 | End: 2018-05-18 | Stop reason: HOSPADM

## 2018-05-12 RX ORDER — BUDESONIDE 0.5 MG/2ML
500 INHALANT ORAL
Status: DISCONTINUED | OUTPATIENT
Start: 2018-05-12 | End: 2018-05-18 | Stop reason: HOSPADM

## 2018-05-12 RX ORDER — HYDROMORPHONE HYDROCHLORIDE 1 MG/ML
0.5 INJECTION, SOLUTION INTRAMUSCULAR; INTRAVENOUS; SUBCUTANEOUS ONCE
Status: DISCONTINUED | OUTPATIENT
Start: 2018-05-12 | End: 2018-05-12

## 2018-05-12 RX ORDER — CARVEDILOL 12.5 MG/1
12.5 TABLET ORAL 2 TIMES DAILY WITH MEALS
Status: DISCONTINUED | OUTPATIENT
Start: 2018-05-12 | End: 2018-05-18 | Stop reason: HOSPADM

## 2018-05-12 RX ADMIN — ARFORMOTEROL TARTRATE 15 MCG: 15 SOLUTION RESPIRATORY (INHALATION) at 21:21

## 2018-05-12 RX ADMIN — FENTANYL CITRATE 25 MCG: 50 INJECTION, SOLUTION INTRAMUSCULAR; INTRAVENOUS at 14:55

## 2018-05-12 RX ADMIN — PIPERACILLIN SODIUM AND TAZOBACTAM SODIUM 3.38 G: 3; .375 INJECTION, POWDER, LYOPHILIZED, FOR SOLUTION INTRAVENOUS at 15:11

## 2018-05-12 RX ADMIN — CARVEDILOL 12.5 MG: 12.5 TABLET, FILM COATED ORAL at 19:01

## 2018-05-12 RX ADMIN — AMLODIPINE BESYLATE 10 MG: 5 TABLET ORAL at 19:01

## 2018-05-12 RX ADMIN — ACETAMINOPHEN 650 MG: 325 TABLET, FILM COATED ORAL at 16:40

## 2018-05-12 RX ADMIN — HYDROMORPHONE HYDROCHLORIDE 2 MG: 2 INJECTION INTRAMUSCULAR; INTRAVENOUS; SUBCUTANEOUS at 19:00

## 2018-05-12 RX ADMIN — BUDESONIDE 500 MCG: 0.5 INHALANT RESPIRATORY (INHALATION) at 21:21

## 2018-05-12 RX ADMIN — HYDROMORPHONE HYDROCHLORIDE 2 MG: 2 INJECTION INTRAMUSCULAR; INTRAVENOUS; SUBCUTANEOUS at 23:16

## 2018-05-12 NOTE — IP AVS SNAPSHOT
[address] [facility] 13 
[phone] Patient: Adriano Larios MRN: [id number] :[id number] About your hospitalization You were admitted on:  May 12, 2018 You last received care in the:  [address] [place] [address] SURG You were discharged on:  May 18, 2018 Why you were hospitalized Your primary diagnosis was:  Abscess Of Abdominal Cavity (Hcc) Follow-up Information Follow up With Details Comments Contact Info [address] On 2018  [address] [place] [address] 
[phone] Catrachito Jacobson NP On 2018 Hospital f/u PCP appointment Wednesday, 18 @ 1:45 p.m. with Jaquelin Collins [address] [facility] 13 
[phone] Your Scheduled Appointments Wednesday May 23, 2018  1:45 PM EDT TRANSITIONAL CARE MANAGEMENT with Jaquelin Collins  [facility] ([facility]) [address] [facility] 13  
[phone] Thursday May 31, 2018 To Be Determined SKILLED NURSING SUPERVISORY VIST LPN with Ophelia Mathews RN  
[address] ([address] [place] [address]) [address] ([address] [place] [address]) 2018 11:00 AM EDT Any with Irma Barone NP  [facility] ([facility]) [place]  [address] [address] [phone] Discharge Orders None A check fahad indicates which time of day the medication should be taken. My Medications START taking these medications Instructions Each Dose to Equal  
 Morning Noon Evening Bedtime  
 amoxicillin-clavulanate 500-125 mg per tablet Commonly known as:  AUGMENTIN Your last dose was: Your next dose is: Take 1 Tab by mouth two (2) times a day for 7 days. 1 Tab HYDROcodone-acetaminophen  mg tablet Commonly known as:  Salvador Smith Your last dose was: Your next dose is: Take 1 Tab by mouth every six (6) hours as needed for up to 7 days. Max Daily Amount: 4 Tabs. 1 Tab Lactobacillus Acidoph & Bulgar 1 million cell Tab tablet Commonly known as:  Zena Like Your last dose was: Your next dose is: Take 2 Tabs by mouth two (2) times a day for 7 days. 2 Tab CHANGE how you take these medications Instructions Each Dose to Equal  
 Morning Noon Evening Bedtime  
 amLODIPine 5 mg tablet Commonly known as:  Ari Renee What changed:  how much to take Your last dose was: Your next dose is: Take 1 Tab by mouth daily. 5 mg CONTINUE taking these medications Instructions Each Dose to Equal  
 Morning Noon Evening Bedtime  
 albuterol 90 mcg/actuation inhaler Commonly known as:  PROVENTIL HFA, VENTOLIN HFA, PROAIR HFA Your last dose was: Your next dose is: Take 1-2 Puffs by inhalation every four (4) hours as needed for Wheezing or Shortness of Breath. 1-2 Puff  
    
   
   
   
  
 allopurinol 100 mg tablet Commonly known as:  [employer] Your last dose was: Your next dose is: Take 100 mg by mouth daily (after breakfast). Needs to TAKE on a full stomach; will cause her to be nauseated. 100 mg  
    
   
   
   
  
 amitriptyline 25 mg tablet Commonly known as:  ELAVIL Your last dose was: Your next dose is: Take 25 mg by mouth nightly as needed for Sleep. 25 mg  
    
   
   
   
  
 apixaban 5 mg tablet Commonly known as:  Franck Kohuryo Your last dose was: Your next dose is: Take 1 Tab by mouth every twelve (12) hours. 5 mg  
    
   
   
   
  
 azaTHIOprine 50 mg tablet Commonly known as:  [employer] Your last dose was: Your next dose is: Take 50 mg by mouth daily (after breakfast). 50 mg COREG 6.25 mg tablet Generic drug:  carvedilol Your last dose was: Your next dose is: Take 12.5 mg by mouth two (2) times daily (with meals). 12.5 mg  
    
   
   
   
  
 cyanocobalamin 2,500 mcg sublingual tablet Commonly known as:  VITAMIN B-12 Your last dose was: Your next dose is: Take 1 Tab by mouth daily. 2500 mcg  
    
   
   
   
  
 fluticasone-vilanterol 200-25 mcg/dose inhaler Commonly known as:  BREO ELLIPTA Your last dose was: Your next dose is: Take 1 Puff by inhalation daily. Rinse mouth out after use 1 Puff  
    
   
   
   
  
 gemfibrozil 600 mg tablet Commonly known as:  LOPID Your last dose was: Your next dose is: Take 300 mg by mouth daily. 300 mg  
    
   
   
   
  
 pantoprazole 40 mg tablet Commonly known as:  PROTONIX Your last dose was: Your next dose is: Take 1 Tab by mouth Daily (before breakfast). 40 mg  
    
   
   
   
  
 PLAQUENIL 200 mg tablet Generic drug:  hydroxychloroquine Your last dose was: Your next dose is: Take 200 mg by mouth two (2) times a day. 200 mg  
    
   
   
   
  
 polyethylene glycol 17 gram packet Commonly known as:  Lugene Minder Your last dose was: Your next dose is: Take 1 Packet by mouth daily. 17 g  
    
   
   
   
  
 predniSONE 5 mg tablet Commonly known as:  Josh Noss Your last dose was: Your next dose is: Take 5 mg by mouth daily. 5 mg Senna 8.6 mg tablet Generic drug:  senna Your last dose was: Your next dose is: Take 1 Tab by mouth daily as needed for Constipation. for constipation 1 Tab Where to Get Your Medications Information on where to get these meds will be given to you by the nurse or doctor. ! Ask your nurse or doctor about these medications  
  amoxicillin-clavulanate 500-125 mg per tablet HYDROcodone-acetaminophen  mg tablet Lactobacillus Acidoph & Bulgar 1 million cell Tab tablet Opioid Education Prescription Opioids: What You Need to Know: 
 
 
ATTENDING PHYSICIAN: Pankaj Rowland MD 
DISCHARGING PROVIDER: Pankaj Rowland MD   
To contact this individual call [phone] and ask the  to page. If unavailable ask to be transferred the Adult Hospitalist Department. DISCHARGE DIAGNOSES Abdominal abscess CONSULTATIONS: IP CONSULT TO NEPHROLOGY 
IP CONSULT TO INFECTIOUS DISEASES 
IP CONSULT TO GENERAL SURGERY 
IP CONSULT TO HOSPITALIST 
IP CONSULT TO PULMONOLOGY PROCEDURES/SURGERIES: * No surgery found * PENDING TEST RESULTS:  
At the time of discharge the following test results are still pending: FOLLOW UP APPOINTMENTS:  
Follow-up Information Follow up With Details Comments Contact Info [address] [place] [address] [facility] [address] [phone] Rupert Cheatham NP In 1 week  [address] 
[phone] ADDITIONAL CARE RECOMMENDATIONS:  
 
 DIET: Regular Diet and Cardiac Diet ACTIVITY: Activity as tolerated WOUND CARE:  
 
EQUIPMENT needed:  
 
 
DISCHARGE MEDICATIONS: 
 See Medication Reconciliation Form · It is important that you take the medication exactly as they are prescribed. · Keep your medication in the bottles provided by the pharmacist and keep a list of the medication names, dosages, and times to be taken in your wallet. · Do not take other medications without consulting your doctor. NOTIFY YOUR PHYSICIAN FOR ANY OF THE FOLLOWING:  
Fever over 101 degrees for 24 hours. Chest pain, shortness of breath, fever, chills, nausea, vomiting, diarrhea, change in mentation, falling, weakness, bleeding. Severe pain or pain not relieved by medications. Or, any other signs or symptoms that you may have questions about. DISPOSITION: 
  Home With: 
 OT  PT  [facility]  RN  
  
 SNF/Inpatient Rehab/LTAC Independent/assisted living Hospice Other: CDMP Checked:  
Yes x PROBLEM LIST Updated: 
Yes x Signed:  
Juan A Mayer MD 
5/18/2018 11:41 AM 
 
ACO Transitions of Care Introducing [employer] Ekaterina Krishnamurthy offers a voluntary care coordination program to provide high quality service and care to [facility] fee-for-service beneficiaries. Mariela Mtz was designed to help you enhance your health and well-being through the following services: ? Transitions of Care  support for individuals who are transitioning from one care setting to another (example: Hospital to home). ? Chronic and Complex Care Coordination  support for individuals and caregivers of those with serious or chronic illnesses or with more than one chronic (ongoing) condition and those who take a number of different medications.   
 
 
If you meet specific medical criteria, a [address] [place] [address] Rd may call you directly to coordinate your care with your primary care physician and your other care providers. For questions about the [facility] programs, please, contact your physicians office. For general questions or additional information about Accountable Care Organizations: 
Please visit www.medicare.gov/acos. html or call 1-800-MEDICARE (0-257.546.7587) TTY users should call 3-697.417.8463. SpeechVive Announcement We are excited to announce that we are making your provider's discharge notes available to you in SpeechVive. You will see these notes when they are completed and signed by the physician that discharged you from your recent hospital stay. If you have any questions or concerns about any information you see in SpeechVive, please call the Health Information Department where you were seen or reach out to your Primary Care Provider for more information about your plan of care. Introducing [facility]! Cb Marcos introduces SpeechVive patient portal. Now you can access parts of your medical record, email your doctor's office, and request medication refills online. 1. In your internet browser, go to [email or web address] 2. Click on the First Time User? Click Here link in the Sign In box. You will see the New Member Sign Up page. 3. Enter your SpeechVive Access Code exactly as it appears below. You will not need to use this code after youve completed the sign-up process. If you do not sign up before the expiration date, you must request a new code. · SpeechVive Access Code: [email or web address] Expires: 6/3/2018 11:58 AM 
 
4. Enter the last four digits of your Social Security Number (xxxx) and Date of Birth (mm/dd/yyyy) as indicated and click Submit. You will be taken to the next sign-up page. 5. Create a SpeechVive ID. This will be your SpeechVive login ID and cannot be changed, so think of one that is secure and easy to remember. 6. Create a SpeechVive password. You can change your password at any time. 7. Enter your Password Reset Question and Answer. This can be used at a later time if you forget your password. 8. Enter your e-mail address. You will receive e-mail notification when new information is available in [address] 9. Click Sign Up. You can now view and download portions of your medical record. 10. Click the Download Summary menu link to download a portable copy of your medical information. If you have questions, please visit the Frequently Asked Questions section of the frestyl website. Remember, frestyl is NOT to be used for urgent needs. For medical emergencies, dial 911. Now available from your iPhone and Android! Introducing Nito Major As a [facility] patient, I wanted to make you aware of our electronic visit tool called Nito Major. [email or web address] 24/Grapeshot allows you to connect within minutes with a medical provider 24 hours a day, seven days a week via a mobile device or tablet or logging into a secure website from your computer. You can access Nito Major from anywhere in the [place]. A virtual visit might be right for you when you have a simple condition and feel like you just dont want to get out of bed, or cant get away from work for an appointment, when your regular Nagy [facility] provider is not available (evenings, weekends or holidays), or when youre out of town and need minor care. Electronic visits cost only $49 and if the [email or web address] 24/Grapeshot provider determines a prescription is needed to treat your condition, one can be electronically transmitted to a nearby pharmacy*. Please take a moment to enroll today if you have not already done so. The enrollment process is free and takes just a few minutes. To enroll, please download the Ubitexx/Grapeshot judith to your tablet or phone, or visit [email or web address] to enroll on your computer.    
And, as an [address] [place] [address] patient with a [employer] account, the results of your visits will be scanned into your electronic medical record and your primary care provider will be able to view the scanned results. We urge you to continue to see your regular Vera Decent provider for your ongoing medical care. And while your primary care provider may not be the one available when you seek a Nito Major virtual visit, the peace of mind you get from getting a real diagnosis real time can be priceless. For more information on Nito Rogersfin, view our Frequently Asked Questions (FAQs) at [email or web address]. Sincerely, 
 
Manju Mendieta MD 
Chief Medical Officer [employer] *:  certain medications cannot be prescribed via Image[employer]pankajWeiju Unresulted Labs-Please follow up with your PCP about these lab tests Order Current Status CULTURE, ANAEROBIC Collected (05/16/18 1230) CT GUIDE CATH/PERC DRAIN FLUID W SI In process CULTURE, FUNGUS In process AFB CULTURE + SMEAR W/RFLX ID FROM CULTURE Preliminary result Providers Seen During Your Hospitalization Provider Specialty Primary office phone Mayelin Garces MD Emergency Medicine [phone] Say Kaminski MD Hospitalist [phone] Elisa Brown MD Internal Medicine [phone] Simona Kowalski MD Internal Medicine [phone] Your Primary Care Physician (PCP) Primary Care Physician Office Phone Office Fax Jacqueline Pagan [phone] You are allergic to the following Allergen Reactions Compazine (Prochlorperazine Edisylate) Nausea and Vomiting Palpitations Recent Documentation Height Weight BMI OB Status Smoking Status 1.651 m 62.4 kg 22.88 kg/m2 Medically Induced Never Smoker Emergency Contacts Name Discharge Info Relation Home Work Mobile [address] CAREGIVER [3] Mother [14] [phone] Stew Lockhart CAREGIVER [3] Father [15] [phone] Patient Belongings The following personal items are in your possession at time of discharge: 
  Dental Appliances: None         Home Medications: None   Jewelry: None  Clothing: With patient    Other Valuables: Cell Phone Please provide this summary of care documentation to your next provider. Signatures-by signing, you are acknowledging that this After Visit Summary has been reviewed with you and you have received a copy. Patient Signature:  ____________________________________________________________ Date:  ____________________________________________________________  
  
[facility] Provider Signature:  ____________________________________________________________ Date:  ____________________________________________________________

## 2018-05-12 NOTE — ED PROVIDER NOTES
HPI Comments: 29 y.o. female with extensive past medical history, please see list, significant for anemia, heart failure, carditis, hypercholesterolemia, chronic pain, lupus, asthma, peritoneal dialysis, nausea, vomiting, ESRD, DDD, thromboembolus, GERD, transfusion history, hemodialysis patient   (T, Th, S), HTN, malignant hypertension with chronic kidney disease stage V who presents from home with family via personal vehicle with chief complaint of abdominal pain. Pt reports severe pain in her abdomen which she states is similar to the pain she had previously (few week ago). Pt also states that her abdomen is hard to the touch and is also experiencing shortness of breath. Abdomen is tender to palpitation. Pt affirms that her gas level is normal, but she is experiencing decreased urine output. Pt is currently undergoing hemodialysis treatment (T, , S) and notes that during her last dialysis 2 days ago she had a fever, which has since abated. Patient denies receiving dialysis today. Pt had an infection in her stomach last month from her peritoneal dialysis. Pt also affirmed that the swelling in her legs has been there \"for a while. \" Pt also has extensive scarring on her abdomen from pervious , insertion, and removal of PD catheter. Patient denies any fever or vomiting. There are no other acute medical concerns at this time. Old Chart Review: Patient was diagnosed with peritonitis ([address] and ESRD and underwent a diagnostic laproscopy with abdominal washout. Pt was discharged on 18. PCP: Kaylyn Skaggs NP  Nephrologist: Ranulfo Carrillo MD    Note written by Boy Moreau, as dictated by Tyra Vazquez MD 12:27 PM          The history is provided by the patient, a relative and medical records (relative was mother). History limited by: patient has previosly been annotated as a poor historian.         Past Medical History:   Diagnosis Date    Anemia     secondary to lupus    Asthma no inhaler use in past 2 to 3 years    Carditis     Chronic kidney disease     ESRD    Chronic pain     DDD (degenerative disc disease), lumbar     ESRD (end stage renal disease) ([address]     GERD (gastroesophageal reflux disease)     Heart failure ([address]     Hemodialysis patient ([address] 2017    [address]  Tuesday,  Thursday,  and Saturday. 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  Poor historian 2018    With medications    Thrombo[facility]     lungs    Transfusion history     Last Transfusion 2017  at [facility]       Past Surgical History:   Procedure Laterality Date    HX  SECTION  11/2006    x1    HX OTHER SURGICAL  9/16/15    INSERTION PD CATH; Removed 2017    HX VASCULAR ACCESS Right 2017    Double-Lumen henry catheter upper chest         Family History:   Problem Relation Age of Onset    Diabetes Father     Hypertension Father     Cancer Other      aunt with breast cancer    Diabetes Mother        Social History     Social History    Marital status: SINGLE     Spouse name: N/A    Number of children: N/A    Years of education: N/A     Occupational History    Not on file.      Social History Main Topics    Smoking status: Never Smoker    Smokeless tobacco: Never Used    Alcohol use No    Drug use: Yes     Special: Prescription, OTC    Sexual activity: Not Currently     Other Topics Concern     Service No    Blood Transfusions No    Caffeine Concern No    Occupational Exposure No    Hobby Hazards No    Sleep Concern No    Stress Concern No    Weight Concern No    Special Diet No    Back Care Yes     low back pain    Exercise No    Bike Helmet No    Seat Belt Yes    Self-Exams No     Social History Narrative    Lives with parents and daughter. ALLERGIES: Compazine [prochlorperazine edisylate]    Review of Systems   Constitutional: Negative for fever. Respiratory: Positive for shortness of breath. Cardiovascular: Positive for leg swelling. Pre-tibial edema    Gastrointestinal: Positive for abdominal distention and abdominal pain. Negative for constipation and vomiting. Hardening of the abdomen, tender to palpitation. Similar to symptoms she previously had   Genitourinary: Positive for decreased urine volume. All other systems reviewed and are negative. Vitals:    05/12/18 1105   BP: (!) 163/122   Pulse: (!) 121   Resp: 20   Temp: 99.7 °F (37.6 °C)   SpO2: 93%   Weight: 65.3 kg (144 lb)   Height: 5' 5\" (1.651 m)            Physical Exam   Constitutional: She is oriented to person, place, and time. She appears well-developed and well-nourished. No distress. Chronically ill and debilitated. HENT:   Head: Normocephalic and atraumatic. Eyes: Conjunctivae are normal. No scleral icterus. Neck: Neck supple. No tracheal deviation present. Cardiovascular: Normal rate, regular rhythm, normal heart sounds and intact distal pulses. Exam reveals no gallop and no friction rub. No murmur heard. Pulmonary/Chest: Effort normal and breath sounds normal. She has no wheezes. She has no rales. Abdominal: Soft. She exhibits no distension. There is tenderness. There is rebound. There is no guarding. Lower abdomen is diffusely tender. Abdomen is hard to touch. Slight rebound is present. Musculoskeletal: She exhibits edema. 3+ bilateral pre tibial edema   Neurological: She is alert and oriented to person, place, and time. Skin: Skin is warm and dry. No rash noted. Psychiatric: She has a normal mood and affect. Nursing note and vitals reviewed.      Note written by Boy Mercado, as dictated by Aleksander Douglas MD 12:27 PM    [facility]      ED Course       Procedures    CONSULT NOTE:  2:19 PM Aleksander Douglas MD spoke with Dr. Valdemar Bullock, Consult for Surgery. Discussed available diagnostic tests and clinical findings. Dr. Valdemar Bullock recommends admission to the hospitalist service for further evaluation. He is aware of patient and will consult during admission. CONSULT NOTE:  2:23 PM Aleksander Douglas MD spoke with Dr. Natalie Morris, Consult for Hospitalist.  Discussed available diagnostic tests and clinical findings. Provider is in agreement with care plans as outlined. Provider will evaluate the patient for admission to the hospital.    2:28 PM  Dr. Fidel Solis is currently being paged as the patient will need dialysis. CONSULT NOTE:  2:33 Cabrera Cueto MD spoke with Dr. Lulu Cherry, Consult for Nephrology. Discussed available diagnostic tests and clinical findings. Dr. Fidel Solis is aware of the patient and will arrange dialysis.     Total critical care time spent exclusive of procedures: 34 minutes

## 2018-05-12 NOTE — H&P
History and Physical        Primary Care Provider: Chester Booker NP      Chief complaint:  Abdominal pain      Subjective:     Hannah Parker is a 29 y.o. female who presents [facility] ED  with chief complaint of left low abdominal pain that began several days ago. Pain is moderate to severe. No relation to position or eating. Pain is sharp and fairly constant. No n/v. She endorsed fever w/ T 100.4 but no chills. No cp/sob. No diarrhea. No dizziness nor lightheadedness. No weight changes. Appetite decreased over last few days. CT abd/pelvis done in ED:  Increase in size of lower anterior abdominal collection status post  removal of drainage catheter. Anasarca with right greater than left pleural  effusions. Renal atrophy. WBC normal.  She is taking immunosuppressive drugs for Lupus. She has ESRD w/ HD T,Th,Sat. She missed today's HD session. Nephrologist and surgeon were notified by ED provider. She was tachycardic in ED. Admission to hospitalist service advised. Review of records indicate that patient was admitted from 3/11/18-4/20/18. She was treated for peritonitis complicated by sepsis. Peritoneal fluid grew heavy E. Coli. She had drain exchanged and upsized by IR on 4/5. She completed IV abx on 4/6/18. Multiple CT scans of abdomen revealed interval changes but she still required washout due to loculations and abscess. She also had prior candida peritonitis. Patient's PMH include ESRD,  Lupus, PE, Asthma, Anemia of chronic disease, GERD, DDD, HTN, and dyslipidemia. Review of Systems:  Further 10 point review of systems is benign. A comprehensive review of systems was negative except for that written in the History of Present Illness.      Past Medical History:   Diagnosis Date    Anemia     secondary to lupus    Asthma     no inhaler use in past 2 to 3 years    Carditis     Chronic kidney disease     ESRD    Chronic pain     DDD (degenerative disc disease), lumbar     [facility] disease) [facility]     GERD (gastroesophageal reflux disease)     Heart failure ([facility]     Hemodialysis patient [facility]) 2017    [address]  Tuesday,  Thursday,  and Saturday.  Hypercholesterolemia     Hypertension     Intractable nausea and vomiting 10/21/2015    Long term (current) use of anticoagulants     Lupus     Lupus (systemic lupus erythematosus) (HCC)     Malignant hypertension with chronic kidney disease stage V ([facility]     Peritoneal dialysis status ([facility] 10/2015    x 2 years Stopped 2017 due to infection and removed.  Poor historian 2018    With medications    Thromboembolus ([facility]     lungs    Transfusion history     Last Transfusion 2017  at [facility]      Past Surgical History:   Procedure Laterality Date    HX  SECTION  11/2006    x1    HX OTHER SURGICAL  9/16/15    INSERTION PD CATH; Removed 2017    HX VASCULAR ACCESS Right 2017    Double-Lumen henry catheter upper chest     Prior to Admission medications    Medication Sig Start Date End Date Taking? Authorizing Provider   senna (SENNA) 8.6 mg tablet Take 1 Tab by mouth daily as needed for Constipation. for constipation   Yes Historical Provider   amitriptyline (ELAVIL) 25 mg tablet Take 25 mg by mouth nightly as needed for Sleep. Yes Historical Provider   amLODIPine (NORVASC) 5 mg tablet Take 1 Tab by mouth daily. Patient taking differently: Take 10 mg by mouth daily. 18  Yes Joycelyn Craig MD   polyethylene glycol (MIRALAX) 17 gram packet Take 1 Packet by mouth daily. 18  Yes Joycelyn Craig MD   predniSONE (DELTASONE) 5 mg tablet Take 5 mg by mouth daily. Yes Historical Provider   fluticasone-vilanterol (BREO ELLIPTA) 200-25 mcg/dose inhaler Take 1 Puff by inhalation daily. Rinse mouth out after use 17  Yes Arnulfo Garcia NP   gemfibrozil (LOPID) 600 mg tablet Take 300 mg by mouth daily.  11/3/17  Yes Historical Provider   carvedilol (COREG) 6.25 mg tablet Take 12.5 mg by mouth two (2) times daily (with meals). Yes Historical Provider   azaTHIOprine (IMURAN) 50 mg tablet Take 50 mg by mouth daily (after breakfast). 11/3/17  Yes Historical Provider   albuterol (PROVENTIL HFA, VENTOLIN HFA, PROAIR HFA) 90 mcg/actuation inhaler Take 1-2 Puffs by inhalation every four (4) hours as needed for Wheezing or Shortness of Breath. 10/30/17  Yes Dianne Esquivel NP   hydroxychloroquine (PLAQUENIL) 200 mg tablet Take 200 mg by mouth two (2) times a day. Yes Darnell Franks MD   allopurinol (ZYLOPRIM) 100 mg tablet Take 100 mg by mouth daily (after breakfast). Needs to TAKE on a full stomach; will cause her to be nauseated. 10/15/15  Yes Historical Provider   cyanocobalamin (VITAMIN B-12) 2,500 mcg sublingual tablet Take 1 Tab by mouth daily. 10/27/15  Yes Maria Esther Fraga MD   pantoprazole (PROTONIX) 40 mg tablet Take 1 Tab by mouth Daily (before breakfast). 10/23/15  Yes Mayra Olmos MD   apixaban (ELIQUIS) 5 mg tablet Take 1 Tab by mouth every twelve (12) hours. 4/30/18   Lorin Vaca MD     Allergies   Allergen Reactions    Compazine [Prochlorperazine Edisylate] Nausea and Vomiting and Palpitations      Family History   Problem Relation Age of Onset    Diabetes Father     Hypertension Father     Cancer Other      aunt with breast cancer    Diabetes Mother         SOCIAL HISTORY:  Patient resides at Home. Patient ambulates unassisted.    Smoking history: never  Alcohol history: none     Objective:       Physical Exam:   Visit Vitals    BP (!) 180/132    Pulse (!) 115    Temp 98.8 °F (37.1 °C)    Resp 20    Ht 5' 5\" (1.651 m)    Wt 65.7 kg (144 lb 13.5 oz)    SpO2 95%    BMI 24.1 kg/m2     General appearance: alert, cooperative, no distress, appears stated age  Head: Normocephalic, without obvious abnormality, atraumatic  Eyes: negative  Neck: supple, symmetrical, trachea midline, no adenopathy, thyroid: not enlarged, symmetric, no tenderness/mass/nodules, no carotid bruit and no JVD  Lungs: clear to auscultation bilaterally  Heart:  Tachycardic, regular, no m/g/r  Abdomen: soft, mild tenderness LLQ, no rebound nor guarding; firmness LLQ, +bowel sounds  Extremities: extremities normal, atraumatic, no cyanosis; 2+ leg edema  Pulses: 2+ and symmetric  Skin: Skin color, texture, turgor normal. No rashes or lesions  Neurologic: Grossly normal     Data Review: All diagnostic labs and studies have been reviewed. Chest x-ray: Persistent bilateral airspace disease, right greater than left,  compatible with pneumonia. Persistent small bilateral pleural effusions. Assessment:     Active Problems:    Abscess of abdominal cavity ([address] (5/12/2018)    Abscess of abdominal cavity; recent peritonitis; wbc low. However, patient receiving immunosuppressives. ESRD on HD  HTN - poorly controlled  Lupus  H/o PE - on chronic anticoagulation w/ Eliquis indefinitely per dc summary from March 2018. Anemia of chronic disease  GERD      Plan:     1. IV abx  2. HD per renal  3. Continue antibiotics  4. Continue antihypertensive meds; add prn IV hydralazine  5. Pain control  6. Hold imuran and Plaquenil   7. Hold Eliquis at this time in case intervention needed  8. Continue prednisone  9. Surgery, Nephrology consulted  Spoke to Dr. Tiana Montez from IR. Recommends no drainage of fluid collection via IR at this time. Patient developed peritonitis after drain placed last time. Follow cultures. Full code.       Signed By: Ani Mackay MD     May 12, 2018

## 2018-05-12 NOTE — CONSULTS
Surgery Consult    Subjective:      Agnes Rae is a 29 y.o. female who presents complaining of recurrent abdominal pain. The patient was recently in the hospital for intra-abdominal infection. At that time she required IR and Operative drainage. She was home for about 2 weeks with her drain and had it removed 5 days ago. 2 days ago she began noticing a recurrence of her pain. She had fevers at home. No nausea or vomiting. Of note she has not taken her Eliquis for  3 days. Patient Active Problem List    Diagnosis Date Noted    Abscess of abdominal cavity ([address] 05/12/2018    Sepsis ([address] 04/29/2018    Generalized abdominal pain 04/04/2018    Other constipation 04/04/2018    Other ascites 04/04/2018    Peritonitis ([address] 03/11/2018    History of pulmonary embolism 12/08/2017    Hypomagnesemia 10/30/2017    Hypocalcemia 10/30/2017    Hypokalemia 10/27/2017    Hypertension 10/14/2017    Chronic bilateral low back pain without sciatica 05/26/2017    Anemia of renal disease 02/21/2017    Dependence on peritoneal dialysis ([address] 02/21/2017    ACP (advance care planning) 02/21/2017    ESRD on peritoneal dialysis ([address] 10/07/2016    Malignant hypertension 07/11/2016    Encounter for monitoring opioid maintenance therapy 11/03/2015    Lupus nephritis ([address] 03/10/2012    Lupus 02/27/2012     Past Medical History:   Diagnosis Date    Anemia     secondary to lupus    Asthma     no inhaler use in past 2 to 3 years    Carditis     Chronic kidney disease     ESRD    Chronic pain     DDD (degenerative disc disease), lumbar     ESRD (end stage renal disease) ([address]     GERD (gastroesophageal reflux disease)     Heart failure ([address]     Hemodialysis patient ([address] 12/21/2017    [address]  Tuesday,  Thursday,  and Saturday.      Hypercholesterolemia     Hypertension     Intractable nausea and vomiting 10/21/2015    Long term (current) use of anticoagulants     Lupus     Lupus (systemic lupus erythematosus) (HCC)     Malignant hypertension with chronic [facility] disease [facility]     Peritoneal dialysis status ([facility] 10/2015    x 2 years Stopped 2017 due to infection and removed.  Poor historian 2018    With medications    Thromboembolus ([facility] 2013    lungs    Transfusion history     Last Trans[facility] 2017  [facility]      Past Surgical History:   Procedure Laterality Date    HX  SECTION  11/2006    x1    HX OTHER SURGICAL  9/16/15    INSERTION PD CATH; Removed 2017    HX VASCULAR ACCESS Right 2017    Double-Lumen henry catheter upper chest      Social History   Substance Use Topics    Smoking status: Never Smoker    Smokeless tobacco: Never Used    Alcohol use No      Family History   Problem Relation Age of Onset    Diabetes Father     Hypertension Father     Cancer Other      aunt with breast cancer    Diabetes Mother       Current Facility-Administered Medications   Medication Dose Route Frequency    piperacillin-tazobactam (ZOSYN) 3.375 g in 0.9% sodium chloride (MBP/ADV) 100 mL  3.375 g IntraVENous NOW     Current Outpatient Prescriptions   Medication Sig    senna (SENNA) 8.6 mg tablet Take 1 Tab by mouth daily as needed for Constipation. for constipation    HYDROmorphone (DILAUDID) 4 mg tablet Take 4 mg by mouth every four (4) hours as needed for Pain. for pain    apixaban (ELIQUIS) 5 mg tablet Take 1 Tab by mouth every twelve (12) hours.  HYDROmorphone (DILAUDID) 4 mg tablet Take 1 Tab by mouth every four (4) hours as needed. Max Daily Amount: 24 mg. (Patient not taking: Reported on 2018)    levoFLOXacin (LEVAQUIN) 500 mg tablet Take 1 Tab by mouth every fourty-eight (48) hours. Take after HD    amitriptyline (ELAVIL) 25 mg tablet Take 25 mg by mouth nightly as needed for Sleep.  amLODIPine (NORVASC) 5 mg tablet Take 1 Tab by mouth daily.  (Patient taking differently: Take 10 mg by mouth daily.)    polyethylene glycol (MIRALAX) 17 gram packet Take 1 Packet by mouth daily.  amitriptyline (ELAVIL) 25 mg tablet Take 1 Tab by mouth nightly. PRN (Patient taking differently: Take 1 Tab by mouth nightly. rewritten for correctness  Indications: sleep)    predniSONE (DELTASONE) 5 mg tablet Take 5 mg by mouth daily.  fluticasone-vilanterol (BREO ELLIPTA) 200-25 mcg/dose inhaler Take 1 Puff by inhalation daily. Rinse mouth out after use    gemfibrozil (LOPID) 600 mg tablet Take 300 mg by mouth daily.  carvedilol (COREG) 6.25 mg tablet Take 12.5 mg by mouth two (2) times daily (with meals).  azaTHIOprine (IMURAN) 50 mg tablet Take 50 mg by mouth daily (after breakfast).  albuterol (PROVENTIL HFA, VENTOLIN HFA, PROAIR HFA) 90 mcg/actuation inhaler Take 1-2 Puffs by inhalation every four (4) hours as needed for Wheezing or Shortness of Breath.  hydroxychloroquine (PLAQUENIL) 200 mg tablet Take 200 mg by mouth two (2) times a day.  allopurinol (ZYLOPRIM) 100 mg tablet Take 100 mg by mouth daily (after breakfast). Needs to TAKE on a full stomach; will cause her to be nauseated.  cyanocobalamin (VITAMIN B-12) 2,500 mcg sublingual tablet Take 1 Tab by mouth daily.  pantoprazole (PROTONIX) 40 mg tablet Take 1 Tab by mouth Daily (before breakfast).       Allergies   Allergen Reactions    Compazine [Prochlorperazine Edisylate] Nausea and Vomiting and Palpitations       Review of Systems:    Constitutional: positive for fevers  Eyes: negative  Ears, Nose, Mouth, Throat, and Face: negative  Respiratory: dyspnea  Cardiovascular: edema   Gastrointestinal: positive for abdominal pain  Genitourinary:dialysis   Integument/Breast: negative  Hematologic/Lymphatic: negative  Musculoskeletal:edema  Neurological: negative  Behavioral/Psychiatric: negative  Endocrine: negative  Allergic/Immunologic: negative    Objective:        Visit Vitals    BP (!) 163/115    Pulse (!) 117    Temp 99.7 °F (37.6 °C)    Resp 20    Ht 5' 5\" (1.651 m)    Wt 144 lb (65.3 kg)    SpO2 95%    BMI 23.96 kg/m2       Physical Exam:  GENERAL: alert, cooperative, no distress, appears older than stated age, EYE: negative, THROAT & NECK: normal, LUNG: clear to auscultation bilaterally, HEART: regular rate and rhythm, ABDOMEN: soft, with lower abdominal tenderness, EXTREMITIES:  , edema RUE , SKIN: Normal., NEUROLOGIC: negative, PSYCH: non focal    Imaging:  images and reports reviewed  CT- Increase in size of lower anterior abdominal collection status post  removal of drainage catheter. Anasarca with right greater than left pleural  effusions. Renal atrophy. intra-abdominal collection has  increased in size measuring 12.3 x 4.2 x 11.4  Lab/Data Review: All lab results for the last 24 hours reviewed. Recent Results (from the past 24 hour(s))   CBC WITH AUTOMATED DIFF    Collection Time: 05/12/18 11:31 AM   Result Value Ref Range    WBC 3.0 (L) 3.6 - 11.0 K/uL    RBC 3.48 (L) 3.80 - 5.20 M/uL    HGB 9.5 (L) 11.5 - 16.0 g/dL    HCT 31.9 (L) 35.0 - 47.0 %    MCV 91.7 80.0 - 99.0 FL    MCH 27.3 26.0 - 34.0 PG    MCHC 29.8 (L) 30.0 - 36.5 g/dL    RDW 15.1 (H) 11.5 - 14.5 %    PLATELET 426 655 - 847 K/uL    MPV 10.2 8.9 - 12.9 FL    NRBC 0.0 0  WBC    ABSOLUTE NRBC 0.00 0.00 - 0.01 K/uL    NEUTROPHILS 73 32 - 75 %    LYMPHOCYTES 21 12 - 49 %    MONOCYTES 3 (L) 5 - 13 %    EOSINOPHILS 2 0 - 7 %    BASOPHILS 1 0 - 1 %    IMMATURE GRANULOCYTES 0 0.0 - 0.5 %    ABS. NEUTROPHILS 2.2 1.8 - 8.0 K/UL    ABS. LYMPHOCYTES 0.6 (L) 0.8 - 3.5 K/UL    ABS. MONOCYTES 0.1 0.0 - 1.0 K/UL    ABS. EOSINOPHILS 0.1 0.0 - 0.4 K/UL    ABS. BASOPHILS 0.0 0.0 - 0.1 K/UL    ABS. IMM.  GRANS. 0.0 0.00 - 0.04 K/UL    DF SMEAR SCANNED      PLATELET COMMENTS Large Platelets      RBC COMMENTS OVALOCYTES  PRESENT        RBC COMMENTS ANISOCYTOSIS  1+        RBC COMMENTS TEARDROP CELLS  PRESENT        RBC COMMENTS HYPOCHROMIA  1+       METABOLIC PANEL, COMPREHENSIVE    Collection Time: 05/12/18 11:31 AM   Result Value Ref Range    Sodium 138 136 - 145 mmol/L    Potassium 4.6 3.5 - 5.1 mmol/L    Chloride 99 97 - 108 mmol/L    CO2 31 21 - 32 mmol/L    Anion gap 8 5 - 15 mmol/L    Glucose 79 65 - 100 mg/dL    BUN 30 (H) 6 - 20 MG/DL    Creatinine 5.93 (H) 0.55 - 1.02 MG/DL    BUN/Creatinine ratio 5 (L) 12 - 20      GFR est AA 10 (L) >60 ml/min/1.73m2    GFR est non-AA 8 (L) >60 ml/min/1.73m2    Calcium 8.4 (L) 8.5 - 10.1 MG/DL    Bilirubin, total 0.3 0.2 - 1.0 MG/DL    ALT (SGPT) 11 (L) 12 - 78 U/L    AST (SGOT) 28 15 - 37 U/L    Alk. phosphatase 116 45 - 117 U/L    Protein, total 6.8 6.4 - 8.2 g/dL    Albumin 2.0 (L) 3.5 - 5.0 g/dL    Globulin 4.8 (H) 2.0 - 4.0 g/dL    A-G Ratio 0.4 (L) 1.1 - 2.2     LIPASE    Collection Time: 05/12/18 11:31 AM   Result Value Ref Range    Lipase 229 73 - 393 U/L   LACTIC ACID    Collection Time: 05/12/18 11:31 AM   Result Value Ref Range    Lactic acid 1.0 0.4 - 2.0 MMOL/L       Assessment:Plan   Recurrent intra-abdominal abscess- Will need drainage  I have ordered IR drainage and spoken to radiology  May need to wait until tomorrow since she has not had dialysis yet and is she has been eating. Will follow.        Signed By: Tennille Jackson MD     May 12, 2018

## 2018-05-12 NOTE — ED TRIAGE NOTES
Abdominal pain with SOB onset yesterday with edema of bilateral legs and right arm. Dialysis fistula in right upper arm. Missed dialysis today.

## 2018-05-12 NOTE — PROGRESS NOTES
Admission Medication Reconciliation:    Information obtained from: Patient    Significant PMH/Disease States:   Past Medical History:   Diagnosis Date    Anemia     secondary to lupus    Asthma     no inhaler use in past 2 to 3 years    Carditis     Chronic kidney disease     ESRD    Chronic pain     DDD (degenerative disc disease), lumbar     ESRD (end stage renal disease) (HCC)     GERD (gastroesophageal reflux disease)     [facility] failure ([facility]     Hemodialysis patient ([facility] 12/21/2017    [address]  Tuesday,  Thursday,  and Saturday.  Hypercholesterolemia     Hypertension     Intractable nausea and vomiting 10/21/2015    Long term (current) use of anticoagulants     Lupus     Lupus (systemic lupus erythematosus) (HCC)     Malignant hypertension with chronic kidney disease stage V ([facility]     [facility] dialysis status [facility] 10/2015    x 2 years Stopped 12/2017 due to infection and removed.  Poor historian 01/17/2018    With medications    Thromboembolus [facility]) 2013    lungs    Transfusion history     Last Transfusion 12/21/2017  at [facility]       Chief Complaint for this Admission:  Abdominal pain    Allergies:  Compazine [prochlorperazine edisylate]    Prior to Admission Medications:   Prior to Admission Medications   Prescriptions Last Dose Informant Patient Reported? Taking? albuterol (PROVENTIL HFA, VENTOLIN HFA, PROAIR HFA) 90 mcg/actuation inhaler 5/5/2018 at Unknown time Self No Yes   Sig: Take 1-2 Puffs by inhalation every four (4) hours as needed for Wheezing or Shortness of Breath. allopurinol (ZYLOPRIM) 100 mg tablet 5/11/2018 at Unknown time Self Yes Yes   Sig: Take 100 mg by mouth daily (after breakfast). Needs to TAKE on a full stomach; will cause her to be nauseated. amLODIPine (NORVASC) 5 mg tablet 5/11/2018 at Unknown time  No Yes   Sig: Take 1 Tab by mouth daily. Patient taking differently: Take 10 mg by mouth daily.    amitriptyline (ELAVIL) 25 mg tablet 5/11/2018 at Unknown time  Yes Yes   Sig: Take 25 mg by mouth nightly as needed for Sleep.   apixaban (ELIQUIS) 5 mg tablet 5/9/2018  No No   Sig: Take 1 Tab by mouth every twelve (12) hours. azaTHIOprine (IMURAN) 50 mg tablet 5/11/2018 at Unknown time Self Yes Yes   Sig: Take 50 mg by mouth daily (after breakfast). carvedilol (COREG) 6.25 mg tablet 5/11/2018 at Unknown time Self Yes Yes   Sig: Take 12.5 mg by mouth two (2) times daily (with meals). cyanocobalamin (VITAMIN B-12) 2,500 mcg sublingual tablet 5/11/2018 at Unknown time Self No Yes   Sig: Take 1 Tab by mouth daily. fluticasone-vilanterol (BREO ELLIPTA) 200-25 mcg/dose inhaler 5/5/2018 at Unknown time Self No Yes   Sig: Take 1 Puff by inhalation daily. Rinse mouth out after use   gemfibrozil (LOPID) 600 mg tablet 5/11/2018 at Unknown time Self Yes Yes   Sig: Take 300 mg by mouth daily. hydroxychloroquine (PLAQUENIL) 200 mg tablet 5/11/2018 at Unknown time Self Yes Yes   Sig: Take 200 mg by mouth two (2) times a day. pantoprazole (PROTONIX) 40 mg tablet 5/11/2018 at Unknown time Self No Yes   Sig: Take 1 Tab by mouth Daily (before breakfast). polyethylene glycol (MIRALAX) 17 gram packet 5/11/2018 at Unknown time  No Yes   Sig: Take 1 Packet by mouth daily. predniSONE (DELTASONE) 5 mg tablet 5/11/2018 at Unknown time Self Yes Yes   Sig: Take 5 mg by mouth daily. senna (SENNA) 8.6 mg tablet 5/11/2018 at Unknown time  Yes Yes   Sig: Take 1 Tab by mouth daily as needed for Constipation. for constipation      Facility-Administered Medications: None         Comments/Recommendations: Patient states that her drugs come pre-packed from un-named pharmacy and are delivered to HD. Says the RN at dialysis center has them delivered there for her. Allergies were reviewed. Please note:  1. Eliquis: hasn't taken since Wednesday, \"forgot to get them since everything usually comes in my pre-pack. \"  2. Hydromorphone: removed from list, states she is followed by pain management doctor for chronic pain related to lupus (note that she is end stage). States \"she hadn't been taking them\" but due to see doctor on Monday. Deleted:  1. Hydromorphone  2. Levaquin (therapy complete)    Thank you for allowing me to participate in the care of your patient.     Jackie LooneyD, RN #[id number]

## 2018-05-12 NOTE — ROUTINE PROCESS
TRANSFER - OUT REPORT:    Verbal report given to [facility] RN (name) on Angella Jimenez  being transferred to [facility] (unit) for routine progression of care       Report consisted of patients Situation, Background, Assessment and   Recommendations(SBAR). Information from the following report(s) SBAR, ED Summary, MAR, Recent Results and Cardiac Rhythm Sinus Tach was reviewed with the receiving nurse. Lines:   Peripheral IV 05/12/18 Left Antecubital (Active)   Site Assessment Clean, dry, & intact 5/12/2018 11:28 AM   Phlebitis Assessment 0 5/12/2018 11:28 AM   Infiltration Assessment 0 5/12/2018 11:28 AM   Dressing Status Clean, dry, & intact 5/12/2018 11:28 AM   Dressing Type Topical skin adhesive;Transparent 5/12/2018 11:28 AM   Hub Color/Line Status Pink;Capped;Flushed;Patent 5/12/2018 11:28 AM   Action Taken Blood drawn 5/12/2018 11:28 AM        Opportunity for questions and clarification was provided. Patient transported with transport on stretcher.

## 2018-05-12 NOTE — PROGRESS NOTES
Pharmacist Note - Vancomycin Dosing (Hemodialysis Patient)    Consult provided for this 29 y.o. female for indication of abdominal infection. This patient is also on the following antibiotic regimen(s): zosyn 3.375gm q12h    Lab Results   Component Value Date/Time    Creatinine 5.93 (H) 2018 11:31 AM    Creatinine (POC) 5.5 (H) 2018 12:30 PM    WBC 3.0 (L) 2018 11:31 AM    BUN 30 (H) 2018 11:31 AM    BUN (POC) 24 (H) 2018 12:30 PM   Temp (24hrs), Av.6 °F (37.6 °C), Min:98.8 °F (37.1 °C), Max:100.2 °F (37.9 °C)      Estimated CrCl:  ESRD on HD (Monday/Wednesday/Friday)  Cultures:pending  For this HD patient, will initiate therapy with a loading dose of Vancomycin 1250 mg. Dialysis missed 18. Ordered for inpatient. Per MD note, my be 18. Pharmacy to follow patient daily and order levels / make dose adjustments as appropriate.

## 2018-05-12 NOTE — ED NOTES
11:06 AM  I have evaluated the patient as the Provider in Triage. I have reviewed Her vital signs and the triage nurse assessment. I have talked with the patient and any available family and advised that I am the provider in triage and have ordered the appropriate study to initiate their work up based on the clinical presentation during my assessment. I have advised that the patient will be accommodated in the Main ED as soon as possible. I have also requested to contact the triage nurse or myself immediately if the patient experiences any changes in their condition during this brief waiting period.     PmHx: SLE, ESRD on HS T,T,S (not today), RUE fistula, DVT/PE, peritonitis 2/2 PD    Abdominal pain & abd is ridged, low grade temp: 99.7,  Also has swelling around fistula     Will obtain lab & CT abd    Zayra Chamberlain NP

## 2018-05-12 NOTE — PROGRESS NOTES
Primary Nurse Comfort Bartholomew RN and Richy Allison RN performed a dual skin assessment on this patient No impairment noted  Vikash score is 19

## 2018-05-12 NOTE — IP AVS SNAPSHOT
[address] [facility] [address] 
[phone] Patient: Richy Prieto MRN: [id number] :1985 A check fahad indicates which time of day the medication should be taken. My Medications START taking these medications Instructions Each Dose to Equal  
 Morning Noon Evening Bedtime  
 amoxicillin-clavulanate 500-125 mg per tablet Commonly known as:  AUGMENTIN Your last dose was: Your next dose is: Take 1 Tab by mouth two (2) times a day for 7 days. 1 Tab HYDROcodone-acetaminophen  mg tablet Commonly known as:  Shae Mikey Your last dose was: Your next dose is: Take 1 Tab by mouth every six (6) hours as needed for up to 7 days. Max Daily Amount: 4 Tabs. 1 Tab Lactobacillus Acidoph & Bulgar 1 million cell Tab tablet Commonly known as:  Rudine Heart Your last dose was: Your next dose is: Take 2 Tabs by mouth two (2) times a day for 7 days. 2 Tab CHANGE how you take these medications Instructions Each Dose to Equal  
 Morning Noon Evening Bedtime  
 amLODIPine 5 mg tablet Commonly known as:  Radha Samuels What changed:  how much to take Your last dose was: Your next dose is: Take 1 Tab by mouth daily. 5 mg CONTINUE taking these medications Instructions Each Dose to Equal  
 Morning Noon Evening Bedtime  
 albuterol 90 mcg/actuation inhaler Commonly known as:  PROVENTIL HFA, VENTOLIN HFA, PROAIR HFA Your last dose was: Your next dose is: Take 1-2 Puffs by inhalation every four (4) hours as needed for Wheezing or Shortness of Breath. 1-2 Puff  
    
   
   
   
  
 allopurinol 100 mg tablet Commonly known as:  Stephanie Back Your last dose was: Your next dose is: Take 100 mg by mouth daily (after breakfast). Needs to TAKE on a full stomach; will cause her to be nauseated. 100 mg  
    
   
   
   
  
 amitriptyline 25 mg tablet Commonly known as:  ELAVIL Your last dose was: Your next dose is: Take 25 mg by mouth nightly as needed for Sleep. 25 mg  
    
   
   
   
  
 apixaban 5 mg tablet Commonly known as:  Christofer Corrente Your last dose was: Your next dose is: Take 1 Tab by mouth every twelve (12) hours. 5 mg  
    
   
   
   
  
 azaTHIOprine 50 mg tablet Commonly known as:  [employer] Your last dose was: Your next dose is: Take 50 mg by mouth daily (after breakfast). 50 mg COREG 6.25 mg tablet Generic drug:  carvedilol Your last dose was: Your next dose is: Take 12.5 mg by mouth two (2) times daily (with meals). 12.5 mg  
    
   
   
   
  
 cyanocobalamin 2,500 mcg sublingual tablet Commonly known as:  VITAMIN B-12 Your last dose was: Your next dose is: Take 1 Tab by mouth daily. 2500 mcg  
    
   
   
   
  
 fluticasone-vilanterol 200-25 mcg/dose inhaler Commonly known as:  BREO ELLIPTA Your last dose was: Your next dose is: Take 1 Puff by inhalation daily. Rinse mouth out after use 1 Puff  
    
   
   
   
  
 gemfibrozil 600 mg tablet Commonly known as:  LOPID Your last dose was: Your next dose is: Take 300 mg by mouth daily. 300 mg  
    
   
   
   
  
 pantoprazole 40 mg tablet Commonly known as:  PROTONIX Your last dose was: Your next dose is: Take 1 Tab by mouth Daily (before breakfast). 40 mg  
    
   
   
   
  
 PLAQUENIL 200 mg tablet Generic drug:  hydroxychloroquine Your last dose was: Your next dose is: Take 200 mg by mouth two (2) times a day. 200 mg  
    
   
   
   
  
 polyethylene glycol 17 gram packet Commonly known as:  Orysia Munford Your last dose was: Your next dose is: Take 1 Packet by mouth daily. 17 g  
    
   
   
   
  
 predniSONE 5 mg tablet Commonly known as:  Chad [place] Your last dose was: Your next dose is: Take 5 mg by mouth daily. 5 mg Senna 8.6 mg tablet Generic drug:  senna Your last dose was: Your next dose is: Take 1 Tab by mouth daily as needed for Constipation. for constipation 1 Tab Where to Get Your Medications Information on where to get these meds will be given to you by the nurse or doctor. ! Ask your nurse or doctor about these medications  
  amoxicillin-clavulanate 500-125 mg per tablet HYDROcodone-acetaminophen  mg tablet Lactobacillus Acidoph & Bulgar 1 million cell Tab tablet

## 2018-05-12 NOTE — PROGRESS NOTES
rec'd consult for ESRD; chart revd  Pt is Well known to us  Recent d/c after intra abdominal infection. Now with recurrence and abd pain  She is [facility] TTS schedule. Due for HD today    Spoke to [facility]. They are backed up.  She will likely end up getting HD by quentin Vizcarra MD  [facility]

## 2018-05-12 NOTE — PROGRESS NOTES
Zosyn dose adjustment  Indication:  abdominal infection  Current regimen:  3.375gm q6h  Abx regimen: vancomycin x 1   Recent Labs      18   1131   WBC  3.0*   CREA  5.93*   BUN  30*     Est CrCl: 12 ml/min  Temp (24hrs), Av.6 °F (37.6 °C), Min:98.8 °F (37.1 °C), Max:100.2 °F (37.9 °C)    Cultures: pending    Plan: Change to 3.375gm q12h for crcl<20ml/min

## 2018-05-12 NOTE — ED NOTES
Patient is a dialysis patient, who was scheduled to have dialysis today (T,TH, Sat schedule.) Pt states her Hettie Stacks is hard\" and the last time this occurred she had an infection from peritoneal dialysis. Pt discontinued peritoneal dialysis in December, fistula in RUE. Patient reports that she still produces urine. Patient has BLE edema and reports SOB upon exertion x 1 month.

## 2018-05-13 ENCOUNTER — APPOINTMENT (OUTPATIENT)
Dept: CT IMAGING | Age: 33
DRG: 871 | End: 2018-05-13
Attending: SURGERY
Payer: MEDICARE

## 2018-05-13 ENCOUNTER — APPOINTMENT (OUTPATIENT)
Dept: ULTRASOUND IMAGING | Age: 33
DRG: 871 | End: 2018-05-13
Attending: INTERNAL MEDICINE
Payer: MEDICARE

## 2018-05-13 ENCOUNTER — APPOINTMENT (OUTPATIENT)
Dept: GENERAL RADIOLOGY | Age: 33
DRG: 871 | End: 2018-05-13
Attending: RADIOLOGY
Payer: MEDICARE

## 2018-05-13 LAB
ALBUMIN SERPL-MCNC: 1.8 G/DL (ref 3.5–5)
ANION GAP SERPL CALC-SCNC: 6 MMOL/L (ref 5–15)
ANION GAP SERPL CALC-SCNC: 9 MMOL/L (ref 5–15)
APPEARANCE FLD: ABNORMAL
APTT PPP: 30.7 SEC (ref 22.1–32)
BASOPHILS # BLD: 0 K/UL (ref 0–0.1)
BASOPHILS NFR BLD: 1 % (ref 0–1)
BUN SERPL-MCNC: 34 MG/DL (ref 6–20)
BUN SERPL-MCNC: 35 MG/DL (ref 6–20)
BUN/CREAT SERPL: 5 (ref 12–20)
BUN/CREAT SERPL: 5 (ref 12–20)
CALCIUM SERPL-MCNC: 8.2 MG/DL (ref 8.5–10.1)
CALCIUM SERPL-MCNC: 8.3 MG/DL (ref 8.5–10.1)
CHLORIDE SERPL-SCNC: 100 MMOL/L (ref 97–108)
CHLORIDE SERPL-SCNC: 102 MMOL/L (ref 97–108)
CO2 SERPL-SCNC: 26 MMOL/L (ref 21–32)
CO2 SERPL-SCNC: 31 MMOL/L (ref 21–32)
COLOR FLD: ABNORMAL
CREAT SERPL-MCNC: 6.57 MG/DL (ref 0.55–1.02)
CREAT SERPL-MCNC: 6.6 MG/DL (ref 0.55–1.02)
DIFFERENTIAL METHOD BLD: ABNORMAL
EOSINOPHIL # BLD: 0.1 K/UL (ref 0–0.4)
EOSINOPHIL NFR BLD: 3 % (ref 0–7)
ERYTHROCYTE [DISTWIDTH] IN BLOOD BY AUTOMATED COUNT: 15.1 % (ref 11.5–14.5)
GLUCOSE SERPL-MCNC: 82 MG/DL (ref 65–100)
GLUCOSE SERPL-MCNC: 90 MG/DL (ref 65–100)
HCT VFR BLD AUTO: 29.2 % (ref 35–47)
HGB BLD-MCNC: 8.5 G/DL (ref 11.5–16)
IMM GRANULOCYTES # BLD: 0 K/UL (ref 0–0.04)
IMM GRANULOCYTES NFR BLD AUTO: 0 % (ref 0–0.5)
INR PPP: 1.1 (ref 0.9–1.1)
LDH FLD L TO P-CCNC: 88 U/L
LDH SERPL L TO P-CCNC: 287 U/L (ref 81–246)
LYMPHOCYTES # BLD: 0.7 K/UL (ref 0.8–3.5)
LYMPHOCYTES NFR BLD: 25 % (ref 12–49)
LYMPHOCYTES NFR FLD: 85 %
MCH RBC QN AUTO: 27 PG (ref 26–34)
MCHC RBC AUTO-ENTMCNC: 29.1 G/DL (ref 30–36.5)
MCV RBC AUTO: 92.7 FL (ref 80–99)
MESOTHL CELL NFR FLD: 2 %
MONOCYTES # BLD: 0.1 K/UL (ref 0–1)
MONOCYTES NFR BLD: 5 % (ref 5–13)
MONOS+MACROS NFR FLD: 10 %
NEUTROPHILS NFR FLD: 3 %
NEUTS SEG # BLD: 1.9 K/UL (ref 1.8–8)
NEUTS SEG NFR BLD: 66 % (ref 32–75)
NRBC # BLD: 0 K/UL (ref 0–0.01)
NRBC BLD-RTO: 0 PER 100 WBC
NUC CELL # FLD: 898 /CU MM
PHOSPHATE SERPL-MCNC: 5.4 MG/DL (ref 2.6–4.7)
PLATELET # BLD AUTO: 270 K/UL (ref 150–400)
PLATELET COMMENTS,PCOM: ABNORMAL
PMV BLD AUTO: 10.2 FL (ref 8.9–12.9)
POTASSIUM SERPL-SCNC: 4.9 MMOL/L (ref 3.5–5.1)
POTASSIUM SERPL-SCNC: 4.9 MMOL/L (ref 3.5–5.1)
PROT FLD-MCNC: 2.5 G/DL
PROT SERPL-MCNC: 6.8 G/DL (ref 6.4–8.2)
PROTHROMBIN TIME: 11.6 SEC (ref 9–11.1)
RBC # BLD AUTO: 3.15 M/UL (ref 3.8–5.2)
RBC # FLD: >100 /CU MM
RBC MORPH BLD: ABNORMAL
RBC MORPH BLD: ABNORMAL
SODIUM SERPL-SCNC: 137 MMOL/L (ref 136–145)
SODIUM SERPL-SCNC: 137 MMOL/L (ref 136–145)
SPECIMEN SOURCE FLD: ABNORMAL
SPECIMEN SOURCE FLD: NORMAL
SPECIMEN SOURCE FLD: NORMAL
THERAPEUTIC RANGE,PTTT: NORMAL SECS (ref 58–77)
WBC # BLD AUTO: 2.8 K/UL (ref 3.6–11)

## 2018-05-13 PROCEDURE — 77030003462 HC NDL BIOP BRST MDT -B

## 2018-05-13 PROCEDURE — 85730 THROMBOPLASTIN TIME PARTIAL: CPT | Performed by: INTERNAL MEDICINE

## 2018-05-13 PROCEDURE — 32557 INSERT CATH PLEURA W/ IMAGE: CPT

## 2018-05-13 PROCEDURE — 0W9930Z DRAINAGE OF RIGHT PLEURAL CAVITY WITH DRAINAGE DEVICE, PERCUTANEOUS APPROACH: ICD-10-PCS | Performed by: RADIOLOGY

## 2018-05-13 PROCEDURE — 77030010546 HC BG URIN DRNG URES -B

## 2018-05-13 PROCEDURE — 99152 MOD SED SAME PHYS/QHP 5/>YRS: CPT

## 2018-05-13 PROCEDURE — 87205 SMEAR GRAM STAIN: CPT | Performed by: INTERNAL MEDICINE

## 2018-05-13 PROCEDURE — 83615 LACTATE (LD) (LDH) ENZYME: CPT | Performed by: INTERNAL MEDICINE

## 2018-05-13 PROCEDURE — 94664 DEMO&/EVAL PT USE INHALER: CPT

## 2018-05-13 PROCEDURE — 3331090002 HH PPS REVENUE DEBIT

## 2018-05-13 PROCEDURE — 5A1D70Z PERFORMANCE OF URINARY FILTRATION, INTERMITTENT, LESS THAN 6 HOURS PER DAY: ICD-10-PCS | Performed by: INTERNAL MEDICINE

## 2018-05-13 PROCEDURE — 80048 BASIC METABOLIC PNL TOTAL CA: CPT

## 2018-05-13 PROCEDURE — 89050 BODY FLUID CELL COUNT: CPT | Performed by: INTERNAL MEDICINE

## 2018-05-13 PROCEDURE — 94640 AIRWAY INHALATION TREATMENT: CPT

## 2018-05-13 PROCEDURE — 3331090001 HH PPS REVENUE CREDIT

## 2018-05-13 PROCEDURE — 65660000000 HC RM CCU STEPDOWN

## 2018-05-13 PROCEDURE — 74011250636 HC RX REV CODE- 250/636: Performed by: INTERNAL MEDICINE

## 2018-05-13 PROCEDURE — 85610 PROTHROMBIN TIME: CPT | Performed by: INTERNAL MEDICINE

## 2018-05-13 PROCEDURE — 77030011229 HC DIL VESL COON COOK -A

## 2018-05-13 PROCEDURE — 87075 CULTR BACTERIA EXCEPT BLOOD: CPT | Performed by: RADIOLOGY

## 2018-05-13 PROCEDURE — 74011250636 HC RX REV CODE- 250/636

## 2018-05-13 PROCEDURE — C1729 CATH, DRAINAGE: HCPCS

## 2018-05-13 PROCEDURE — 71045 X-RAY EXAM CHEST 1 VIEW: CPT

## 2018-05-13 PROCEDURE — 80069 RENAL FUNCTION PANEL: CPT | Performed by: INTERNAL MEDICINE

## 2018-05-13 PROCEDURE — 74011250637 HC RX REV CODE- 250/637: Performed by: INTERNAL MEDICINE

## 2018-05-13 PROCEDURE — 77030012390 HC DRN CHST BTL GTNG -B

## 2018-05-13 PROCEDURE — 77030003666 HC NDL SPINAL BD -A

## 2018-05-13 PROCEDURE — 77030005208 HC CATH HLDR GLWC -A

## 2018-05-13 PROCEDURE — 74011000258 HC RX REV CODE- 258: Performed by: INTERNAL MEDICINE

## 2018-05-13 PROCEDURE — 0W9J30Z DRAINAGE OF PELVIC CAVITY WITH DRAINAGE DEVICE, PERCUTANEOUS APPROACH: ICD-10-PCS | Performed by: RADIOLOGY

## 2018-05-13 PROCEDURE — 77010033678 HC OXYGEN DAILY

## 2018-05-13 PROCEDURE — 77030018781

## 2018-05-13 PROCEDURE — 77030011230 HC DIL VESL COON COOK -B

## 2018-05-13 PROCEDURE — C1769 GUIDE WIRE: HCPCS

## 2018-05-13 PROCEDURE — 85025 COMPLETE CBC W/AUTO DIFF WBC: CPT

## 2018-05-13 PROCEDURE — 36415 COLL VENOUS BLD VENIPUNCTURE: CPT

## 2018-05-13 PROCEDURE — 90935 HEMODIALYSIS ONE EVALUATION: CPT

## 2018-05-13 PROCEDURE — 74011636637 HC RX REV CODE- 636/637: Performed by: INTERNAL MEDICINE

## 2018-05-13 PROCEDURE — 87205 SMEAR GRAM STAIN: CPT | Performed by: RADIOLOGY

## 2018-05-13 PROCEDURE — 84155 ASSAY OF PROTEIN SERUM: CPT | Performed by: INTERNAL MEDICINE

## 2018-05-13 PROCEDURE — 84157 ASSAY OF PROTEIN OTHER: CPT | Performed by: INTERNAL MEDICINE

## 2018-05-13 PROCEDURE — 74011000250 HC RX REV CODE- 250: Performed by: INTERNAL MEDICINE

## 2018-05-13 RX ORDER — FLUMAZENIL 0.1 MG/ML
0.5 INJECTION INTRAVENOUS ONCE
Status: DISCONTINUED | OUTPATIENT
Start: 2018-05-13 | End: 2018-05-13 | Stop reason: HOSPADM

## 2018-05-13 RX ORDER — HYDROMORPHONE HYDROCHLORIDE 1 MG/ML
2 INJECTION, SOLUTION INTRAMUSCULAR; INTRAVENOUS; SUBCUTANEOUS
Status: DISCONTINUED | OUTPATIENT
Start: 2018-05-13 | End: 2018-05-13 | Stop reason: HOSPADM

## 2018-05-13 RX ORDER — HYDROMORPHONE HYDROCHLORIDE 2 MG/ML
2 INJECTION, SOLUTION INTRAMUSCULAR; INTRAVENOUS; SUBCUTANEOUS
Status: DISCONTINUED | OUTPATIENT
Start: 2018-05-13 | End: 2018-05-14 | Stop reason: ALTCHOICE

## 2018-05-13 RX ORDER — NALOXONE HYDROCHLORIDE 0.4 MG/ML
0.4 INJECTION, SOLUTION INTRAMUSCULAR; INTRAVENOUS; SUBCUTANEOUS AS NEEDED
Status: DISCONTINUED | OUTPATIENT
Start: 2018-05-13 | End: 2018-05-13 | Stop reason: HOSPADM

## 2018-05-13 RX ORDER — FENTANYL CITRATE 50 UG/ML
100 INJECTION, SOLUTION INTRAMUSCULAR; INTRAVENOUS
Status: DISCONTINUED | OUTPATIENT
Start: 2018-05-13 | End: 2018-05-13 | Stop reason: HOSPADM

## 2018-05-13 RX ORDER — SODIUM CHLORIDE 9 MG/ML
25 INJECTION, SOLUTION INTRAVENOUS CONTINUOUS
Status: DISCONTINUED | OUTPATIENT
Start: 2018-05-13 | End: 2018-05-13 | Stop reason: HOSPADM

## 2018-05-13 RX ORDER — MIDAZOLAM HYDROCHLORIDE 1 MG/ML
INJECTION, SOLUTION INTRAMUSCULAR; INTRAVENOUS
Status: DISPENSED
Start: 2018-05-13 | End: 2018-05-13

## 2018-05-13 RX ORDER — MIDAZOLAM HYDROCHLORIDE 1 MG/ML
5 INJECTION, SOLUTION INTRAMUSCULAR; INTRAVENOUS
Status: DISCONTINUED | OUTPATIENT
Start: 2018-05-13 | End: 2018-05-13 | Stop reason: HOSPADM

## 2018-05-13 RX ORDER — FENTANYL CITRATE 50 UG/ML
INJECTION, SOLUTION INTRAMUSCULAR; INTRAVENOUS
Status: DISPENSED
Start: 2018-05-13 | End: 2018-05-13

## 2018-05-13 RX ADMIN — CARVEDILOL 12.5 MG: 12.5 TABLET, FILM COATED ORAL at 09:31

## 2018-05-13 RX ADMIN — BUDESONIDE 500 MCG: 0.5 INHALANT RESPIRATORY (INHALATION) at 08:18

## 2018-05-13 RX ADMIN — MIDAZOLAM HYDROCHLORIDE 1 MG: 1 INJECTION, SOLUTION INTRAMUSCULAR; INTRAVENOUS at 11:18

## 2018-05-13 RX ADMIN — FENTANYL CITRATE 25 MCG: 50 INJECTION, SOLUTION INTRAMUSCULAR; INTRAVENOUS at 12:10

## 2018-05-13 RX ADMIN — AMLODIPINE BESYLATE 10 MG: 5 TABLET ORAL at 09:31

## 2018-05-13 RX ADMIN — PREDNISONE 5 MG: 5 TABLET ORAL at 09:31

## 2018-05-13 RX ADMIN — HYDROMORPHONE HYDROCHLORIDE 2 MG: 2 INJECTION INTRAMUSCULAR; INTRAVENOUS; SUBCUTANEOUS at 07:56

## 2018-05-13 RX ADMIN — VANCOMYCIN HYDROCHLORIDE 1250 MG: 10 INJECTION, POWDER, LYOPHILIZED, FOR SOLUTION INTRAVENOUS at 01:00

## 2018-05-13 RX ADMIN — CARVEDILOL 12.5 MG: 12.5 TABLET, FILM COATED ORAL at 16:50

## 2018-05-13 RX ADMIN — MIDAZOLAM HYDROCHLORIDE 1 MG: 1 INJECTION, SOLUTION INTRAMUSCULAR; INTRAVENOUS at 11:35

## 2018-05-13 RX ADMIN — HYDROMORPHONE HYDROCHLORIDE 2 MG: 2 INJECTION INTRAMUSCULAR; INTRAVENOUS; SUBCUTANEOUS at 16:50

## 2018-05-13 RX ADMIN — MIDAZOLAM HYDROCHLORIDE 1 MG: 1 INJECTION, SOLUTION INTRAMUSCULAR; INTRAVENOUS at 12:06

## 2018-05-13 RX ADMIN — ALLOPURINOL 100 MG: 100 TABLET ORAL at 09:31

## 2018-05-13 RX ADMIN — HYDROMORPHONE HYDROCHLORIDE 2 MG: 2 INJECTION INTRAMUSCULAR; INTRAVENOUS; SUBCUTANEOUS at 14:09

## 2018-05-13 RX ADMIN — GEMFIBROZIL 300 MG: 600 TABLET ORAL at 09:31

## 2018-05-13 RX ADMIN — HYDROMORPHONE HYDROCHLORIDE 2 MG: 2 INJECTION INTRAMUSCULAR; INTRAVENOUS; SUBCUTANEOUS at 11:10

## 2018-05-13 RX ADMIN — MIDAZOLAM HYDROCHLORIDE 1 MG: 1 INJECTION, SOLUTION INTRAMUSCULAR; INTRAVENOUS at 11:29

## 2018-05-13 RX ADMIN — HYDROMORPHONE HYDROCHLORIDE 2 MG: 2 INJECTION INTRAMUSCULAR; INTRAVENOUS; SUBCUTANEOUS at 23:47

## 2018-05-13 RX ADMIN — BUDESONIDE 500 MCG: 0.5 INHALANT RESPIRATORY (INHALATION) at 20:04

## 2018-05-13 RX ADMIN — HYDROCODONE BITARTRATE AND ACETAMINOPHEN 1 TABLET: 10; 325 TABLET ORAL at 13:12

## 2018-05-13 RX ADMIN — ARFORMOTEROL TARTRATE 15 MCG: 15 SOLUTION RESPIRATORY (INHALATION) at 20:04

## 2018-05-13 RX ADMIN — PANTOPRAZOLE SODIUM 40 MG: 40 TABLET, DELAYED RELEASE ORAL at 09:31

## 2018-05-13 RX ADMIN — ARFORMOTEROL TARTRATE 15 MCG: 15 SOLUTION RESPIRATORY (INHALATION) at 08:18

## 2018-05-13 RX ADMIN — FENTANYL CITRATE 50 MCG: 50 INJECTION, SOLUTION INTRAMUSCULAR; INTRAVENOUS at 11:56

## 2018-05-13 RX ADMIN — MIDAZOLAM HYDROCHLORIDE 0.5 MG: 1 INJECTION, SOLUTION INTRAMUSCULAR; INTRAVENOUS at 11:36

## 2018-05-13 RX ADMIN — PIPERACILLIN SODIUM,TAZOBACTAM SODIUM 3.38 G: 3; .375 INJECTION, POWDER, FOR SOLUTION INTRAVENOUS at 03:54

## 2018-05-13 RX ADMIN — MIDAZOLAM HYDROCHLORIDE 0.5 MG: 1 INJECTION, SOLUTION INTRAMUSCULAR; INTRAVENOUS at 11:54

## 2018-05-13 RX ADMIN — HYDROMORPHONE HYDROCHLORIDE 2 MG: 2 INJECTION INTRAMUSCULAR; INTRAVENOUS; SUBCUTANEOUS at 03:50

## 2018-05-13 RX ADMIN — PIPERACILLIN SODIUM,TAZOBACTAM SODIUM 3.38 G: 3; .375 INJECTION, POWDER, FOR SOLUTION INTRAVENOUS at 14:11

## 2018-05-13 RX ADMIN — FENTANYL CITRATE 50 MCG: 50 INJECTION, SOLUTION INTRAMUSCULAR; INTRAVENOUS at 11:33

## 2018-05-13 NOTE — PROGRESS NOTES
1300: Patient arrived back to [facility] from thoracentesis and abdominal drain insertion, Pleural drain at 375mL, No drainage of note in abdominal accordian drain. Patient has no complaints. 1600: Bedside shift change report given to DANITZA Leon (oncoming nurse) by Christoph Baumann RN (offgoing nurse). Report included the following information SBAR, Kardex, Intake/Output, MAR, Recent Results, Med Rec Status and Cardiac Rhythm sinus tachycardia. Problem: Falls - Risk of  Goal: *Absence of Falls  Document Alex Fall Risk and appropriate interventions in the flowsheet. Outcome: Progressing Towards Goal  Patient has not fallen during shift. Call bell within reach. Tray table within reach. Bed in lowest position. Non-skid socks on patient's feet when ambulating. Appropriate lighting in room. Patient up with standby assistance and calls ou appropriately. Fall Risk Interventions:  Mobility Interventions: Assess mobility with egress test, Communicate number of staff needed for ambulation/transfer, Patient to call before getting OOB         Medication Interventions: Assess postural VS orthostatic hypotension, Evaluate medications/consider consulting pharmacy, Patient to call before getting OOB, Teach patient to arise slowly         History of Falls Interventions: Evaluate medications/consider consulting pharmacy        Problem: Pressure Injury - Risk of  Goal: *Prevention of pressure injury  Document Vikash Scale and appropriate interventions in the flowsheet. Outcome: Progressing Towards Goal  Patient up with assistance and able to turn self. No evidence of pressure ulceration. Pressure Injury Interventions:             Activity Interventions: Increase time out of bed, Pressure redistribution bed/mattress(bed type)    Mobility Interventions: HOB 30 degrees or less, Pressure redistribution bed/mattress (bed type), PT/OT evaluation    Nutrition Interventions: Document food/fluid/supplement intake, Offer support with meals,snacks and hydration                    Problem: General Infection Care Plan (Adult and Pediatric)  Goal: Improvement in signs and symptoms of infection  Outcome: Progressing Towards Goal  Patient on antibiotics and is scheduled for a CT guided abdominal drainage for possible peritonitis. Patient to also receive thoracentesis at the same time.

## 2018-05-13 NOTE — PROGRESS NOTES
Problem: Pain  Goal: *Control of Pain  Outcome: Progressing Towards Goal  Patient receiving 2mg dilaudid q4 prn for abd pain. Goal of pain is 2-3, and medication brings pain from 7 to 4. Will continue to treat with medication, emotional support, and holistic methods. Bedside shift change report given to [address] (oncoming nurse) by Avril Peolpes (offgoing nurse). Report included the following information SBAR, Kardex, ED Summary, MAR and Cardiac Rhythm NSR/Sinus Tach.

## 2018-05-13 NOTE — PROGRESS NOTES
Progress Note    Patient: Jessica Guerra MRN: [id number]  SSN: [id number]    YOB: 1985  Age: 29 y.o. Sex: female      Admit Date: 2018    * No surgery found *    Procedure:      Subjective:     Patient complains of some pain. She had IR drainage of abdominal wall collection and thoracentesis today. Objective:     Visit Vitals    BP (!) 139/104 (BP 1 Location: Left arm, BP Patient Position: At rest)    Pulse (!) 106    Temp 99.3 °F (37.4 °C)    Resp 22    Ht 5' 5\" (1.651 m)    Wt 144 lb 13.5 oz (65.7 kg)    SpO2 94%    BMI 24.1 kg/m2       Temp (24hrs), Av.1 °F (37.3 °C), Min:98 °F (36.7 °C), Max:100.2 °F (37.9 °C)      Physical Exam:    Gen- alert in NAD  Abd- Soft with unchanged tenderness in the lower abdomen. The drain is serosanginous     Data Review: images and reports reviewed    Lab Review: All lab results for the last 24 hours reviewed.   Recent Results (from the past 24 hour(s))   METABOLIC PANEL, BASIC    Collection Time: 18  6:47 AM   Result Value Ref Range    Sodium 137 136 - 145 mmol/L    Potassium 4.9 3.5 - 5.1 mmol/L    Chloride 100 97 - 108 mmol/L    CO2 31 21 - 32 mmol/L    Anion gap 6 5 - 15 mmol/L    Glucose 90 65 - 100 mg/dL    BUN 35 (H) 6 - 20 MG/DL    Creatinine 6.57 (H) 0.55 - 1.02 MG/DL    BUN/Creatinine ratio 5 (L) 12 - 20      GFR est AA 9 (L) >60 ml/min/1.73m2    GFR est non-AA 7 (L) >60 ml/min/1.73m2    Calcium 8.2 (L) 8.5 - 10.1 MG/DL   CBC WITH AUTOMATED DIFF    Collection Time: 18  6:47 AM   Result Value Ref Range    WBC 2.8 (L) 3.6 - 11.0 K/uL    RBC 3.15 (L) 3.80 - 5.20 M/uL    HGB 8.5 (L) 11.5 - 16.0 g/dL    HCT 29.2 (L) 35.0 - 47.0 %    MCV 92.7 80.0 - 99.0 FL    MCH 27.0 26.0 - 34.0 PG    MCHC 29.1 (L) 30.0 - 36.5 g/dL    RDW 15.1 (H) 11.5 - 14.5 %    PLATELET 557 621 - 325 K/uL    MPV 10.2 8.9 - 12.9 FL    NRBC 0.0 0  WBC    ABSOLUTE NRBC 0.00 0.00 - 0.01 K/uL    NEUTROPHILS 66 32 - 75 %    LYMPHOCYTES 25 12 - 49 % MONOCYTES 5 5 - 13 %    EOSINOPHILS 3 0 - 7 %    BASOPHILS 1 0 - 1 %    IMMATURE GRANULOCYTES 0 0.0 - 0.5 %    ABS. NEUTROPHILS 1.9 1.8 - 8.0 K/UL    ABS. LYMPHOCYTES 0.7 (L) 0.8 - 3.5 K/UL    ABS. MONOCYTES 0.1 0.0 - 1.0 K/UL    ABS. EOSINOPHILS 0.1 0.0 - 0.4 K/UL    ABS. BASOPHILS 0.0 0.0 - 0.1 K/UL    ABS. IMM.  GRANS. 0.0 0.00 - 0.04 K/UL    DF SMEAR SCANNED      PLATELET COMMENTS Large Platelets      RBC COMMENTS ANISOCYTOSIS  1+        RBC COMMENTS OVALOCYTES  PRESENT       LD    Collection Time: 05/13/18  6:47 AM   Result Value Ref Range     (H) 81 - 246 U/L   RENAL FUNCTION PANEL    Collection Time: 05/13/18  6:47 AM   Result Value Ref Range    Sodium 137 136 - 145 mmol/L    Potassium 4.9 3.5 - 5.1 mmol/L    Chloride 102 97 - 108 mmol/L    CO2 26 21 - 32 mmol/L    Anion gap 9 5 - 15 mmol/L    Glucose 82 65 - 100 mg/dL    BUN 34 (H) 6 - 20 MG/DL    Creatinine 6.60 (H) 0.55 - 1.02 MG/DL    BUN/Creatinine ratio 5 (L) 12 - 20      GFR est AA 9 (L) >60 ml/min/1.73m2    GFR est non-AA 7 (L) >60 ml/min/1.73m2    Calcium 8.3 (L) 8.5 - 10.1 MG/DL    Phosphorus 5.4 (H) 2.6 - 4.7 MG/DL    Albumin 1.8 (L) 3.5 - 5.0 g/dL   PROTHROMBIN TIME + INR    Collection Time: 05/13/18  9:51 AM   Result Value Ref Range    INR 1.1 0.9 - 1.1      Prothrombin time 11.6 (H) 9.0 - 11.1 sec   PTT    Collection Time: 05/13/18  9:51 AM   Result Value Ref Range    aPTT 30.7 22.1 - 32.0 sec    aPTT, therapeutic range     58.0 - 77.0 SECS   CELL COUNT, BODY FLUID    Collection Time: 05/13/18 12:00 PM   Result Value Ref Range    BODY FLUID TYPE PLEURAL FLUID      FLUID COLOR PINK      FLUID APPEARANCE CLOUDY      FLUID RBC CT. >100 (H) 0 /cu mm    FLUID NUCLEATED CELLS 898 /cu mm       Assessment:     Hospital Problems  Date Reviewed: 5/12/2018          Codes Class Noted POA    * (Principal)Abscess of abdominal cavity ([facility] [address] ICD-10-CM: K65.1  ICD-9-CM: 567.22  5/12/2018 Yes              Plan/Recommendations/Medical Decision Making:   S/p IR drainage. - Will monitor - probably will need a repeat ct scan in a few days to look for improvement  Continue ABX  Signed By: Joe Slade MD     May 13, 2018

## 2018-05-13 NOTE — ROUTINE PROCESS
TRANSFER - OUT REPORT:    Verbal report given to Emerson Enrique, unit RN at [phone] on Angella Jimeenz  being transferred to [facility] for routine progression of care       Report consisted of patients Situation, Background, Assessment and   Recommendations(SBAR). Information from the following report(s) SBAR was reviewed with the receiving nurse. Lines:   Peripheral IV 05/12/18 Left Antecubital (Active)   Site Assessment Clean, dry, & intact 5/13/2018  8:18 AM   Phlebitis Assessment 0 5/13/2018  8:18 AM   Infiltration Assessment 0 5/13/2018  8:18 AM   Dressing Status Clean, dry, & intact 5/13/2018  8:18 AM   Dressing Type Transparent;Tape 5/13/2018  8:18 AM   Hub Color/Line Status Pink; Infusing 5/13/2018  8:18 AM   Action Taken Open ports on tubing capped 5/13/2018  4:06 AM   Alcohol Cap Used Yes 5/13/2018  8:18 AM        Opportunity for questions and clarification was provided. Patient transported with:   transport on stretcher with both drainage tubes intact; connected to accordion drainage bag and pleurevac; respectively.     Pelvic drainage samples and pleural drainage samples taken to lab as ordered by MD

## 2018-05-13 NOTE — CONSULTS
[facility] ([facility])         Nephrology and Hypertension         Patient seen and examined. Full consult [id number]    ASSESSMENT:  ESRD on HD ([facility] unit; TTS)  HTN  Anemia of ESRD  SHPT  Intra abdominal Abscess- with increase in size c/w before on CT A/P. S/p drain placed on 5/12  Anasarca/Pleural effusion  PLAN:  HD today. Ariella was backed up and could not get HD yesterday. She was supposed to get in AM today, but hasnt recd so far. She will get later today  Ct home BP meds  IV ABx per primary team  EPO  will try increase UF as tolerated      D/W pt/Davita  Thanks for Renal consult. We will follow patient with you.     Signed by:  Jagdish Martinez MD  Nephrology and HTN

## 2018-05-13 NOTE — PROGRESS NOTES
Hospitalist Progress Note  Destiney Flores MD  Answering service: [phone] OR [phone] from in house phone      Date of Service:  2018  NAME:  Edgar Ahuja  :  1985  MRN:  [id number]      Admission Summary:   28 yo woman with ESRD on TTSa HD via RUE AVG (previously on PD), anemia, asthma, chronic pain, lumbar DDD, GERD, HLD, HTN, lupus, and h/o PE on apixaban presented to the ED from home on 18 with left lower abdominal pain and missed HD on Sat . She was last admitted at [facility] 3/11/18 - 18 for E coli and Candida peritonitis and sepsis. She had her drain exchanged and upsized by IR on . She completed IV abx on 18. Multiple abdominal CTs revealed interval changes but she still required washout due to loculations and abscess. She was admitted to [facility] with abdominal cavity abscess.     Interval history / Subjective:   C/o pain along lower and right upper abdomen; d/w nurse     Assessment & Plan:     Abdominal cavity abscess with sepsis (POA) - fever, tachycardia, leucopenia on admission  - recently completed IV abx for E coli and Candida peritonitis  - BCx  pending  - empiric vanc, Zosyn started ; de-escalate prn  - plan for CT-guided drainage by IR today  - General Surgery following  - pain control; increase Dilaudid q3h prn with hold parameters  - last took apixaban on  or 5/10    Loculated right pleural effusion (POA) - pt c/o IVY  - will request CT-guided drainage by IR but may need Thoracic Surgery consult  - fluid studies ordered    ESRD on HD - TTSa HD per Renal via RUE AVG  - missed HD yesterday and catch-up HD planned for today sometime    Lupus - immunosuppressives on hold; still on prednisone    H/o VTE - apixaban on hold for procedure(s) today; pt reported last taking on/about  or 5/10  - resume apixaban when able    RUE and b/l LE edema - likely due to ESRD but will check venous dopplers r/o DVT    Anemia of chronic illness - will likely need EPO with HD    GERD - PPI    Code status: full  DVT prophylaxis: SCDs    Care Plan discussed with: Patient/Family and Nurse  Disposition: TBD     Hospital Problems  Date Reviewed: 5/12/2018          Codes Class Noted POA    * (Principal)Abscess of abdominal cavity ([address] ICD-10-CM: K65.1  ICD-9-CM: 567.22  5/12/2018 Yes            Review of Systems:   Pertinent items are noted in HPI. Vital Signs:    Last 24hrs VS reviewed since prior progress note.  Most recent are:  Visit Vitals    BP (!) 151/110 (BP 1 Location: Left arm, BP Patient Position: At rest)    Pulse (!) 110    Temp 99.3 °F (37.4 °C)    Resp 27    Ht 5' 5\" (1.651 m)    Wt 65.7 kg (144 lb 13.5 oz)    SpO2 99%    BMI 24.1 kg/m2     No intake or output data in the 24 hours ending 05/13/18 0901     Physical Examination:     Constitutional:  awake, no acute distress, cooperative, pleasant    ENT:  oral mucosa moist, oropharynx benign    Resp:  b/l rales, no wheeze   CV:  regular rhythm, normal rate, no m/r/g appreciated, RUE and b/l LE edema    GI:  +BS, soft, non distended, diffusely tender     Musculoskeletal:  moves all extremities    Neurologic:  AAOx3, NFD     Skin:  warm, dry; multiple healed abdominal surgical scars    Data Review:    Review and/or order of clinical lab test  Review and/or order of tests in the radiology section of CPT  Review and/or order of tests in the medicine section of CPT    Labs:     Recent Labs      05/13/18   0647  05/12/18   1131   WBC  2.8*  3.0*   HGB  8.5*  9.5*   HCT  29.2*  31.9*   PLT  270  282     Recent Labs      05/13/18   0647  05/12/18   1131   NA  137  138   K  4.9  4.6   CL  100  99   CO2  31  31   BUN  35*  30*   CREA  6.57*  5.93*   GLU  90  79   CA  8.2*  8.4*     Recent Labs      05/12/18   1131   SGOT  28   ALT  11*   AP  116   TBILI  0.3   TP  6.8   ALB  2.0*   GLOB  4.8*   LPSE  229     No results for input(s): INR, PTP, APTT in the last 72 hours.    No lab exists for component: INREXT   No results for input(s): FE, TIBC, PSAT, FERR in the last 72 hours. Lab Results   Component Value Date/Time    Folate 4.1 (L) 03/15/2018 10:32 AM      No results for input(s): PH, PCO2, PO2 in the last 72 hours. No results for input(s): CPK, CKNDX, TROIQ in the last 72 hours.     No lab exists for component: CPKMB  Lab Results   Component Value Date/Time    Cholesterol, total 117 09/25/2015 02:02 PM    HDL Cholesterol 31 (L) 09/25/2015 02:02 PM    LDL, calculated 57 09/25/2015 02:02 PM    Triglyceride 146 09/25/2015 02:02 PM    CHOL/HDL Ratio 7.7 (H) 05/31/2009 10:30 AM     Lab Results   Component Value Date/Time    Glucose (POC) 123 (H) 04/20/2018 05:33 PM    Glucose (POC) 95 04/20/2018 11:19 AM    Glucose (POC) 100 04/20/2018 07:18 AM    Glucose (POC) 119 (H) 04/19/2018 09:11 PM    Glucose (POC) 86 04/19/2018 04:30 PM     Lab Results   Component Value Date/Time    Color YELLOW/STRAW 08/12/2016 09:06 PM    Appearance CLEAR 08/12/2016 09:06 PM    Specific gravity 1.017 08/12/2016 09:06 PM    Specific gravity 1.010 07/11/2016 12:44 PM    pH (UA) 7.0 08/12/2016 09:06 PM    Protein 300 (A) 08/12/2016 09:06 PM    Glucose NEGATIVE  08/12/2016 09:06 PM    Ketone TRACE (A) 08/12/2016 09:06 PM    Bilirubin NEGATIVE  07/11/2016 12:44 PM    Urobilinogen 1.0 08/12/2016 09:06 PM    Nitrites NEGATIVE  08/12/2016 09:06 PM    Leukocyte Esterase NEGATIVE  08/12/2016 09:06 PM    Epithelial cells MODERATE (A) 08/12/2016 09:06 PM    Bacteria 1+ (A) 08/12/2016 09:06 PM    WBC 0-4 08/12/2016 09:06 PM    RBC 0-5 08/12/2016 09:06 PM     Medications Reviewed:     Current Facility-Administered Medications   Medication Dose Route Frequency    allopurinol (ZYLOPRIM) tablet 100 mg  100 mg Oral DAILY AFTER BREAKFAST    amitriptyline (ELAVIL) tablet 25 mg  25 mg Oral QHS PRN    carvedilol (COREG) tablet 12.5 mg  12.5 mg Oral BID WITH MEALS    gemfibrozil (LOPID) tablet 300 mg  300 mg Oral DAILY    pantoprazole (PROTONIX) tablet 40 mg  40 mg Oral ACB    polyethylene glycol (MIRALAX) packet 17 g  17 g Oral DAILY    predniSONE (DELTASONE) tablet 5 mg  5 mg Oral DAILY    senna (SENOKOT) tablet 8.6 mg  1 Tab Oral DAILY PRN    acetaminophen (TYLENOL) tablet 650 mg  650 mg Oral Q4H PRN    HYDROcodone-acetaminophen (NORCO)  mg tablet 1 Tab  1 Tab Oral Q4H PRN    ondansetron (ZOFRAN) injection 4 mg  4 mg IntraVENous Q4H PRN    bisacodyl (DULCOLAX) tablet 5 mg  5 mg Oral DAILY PRN    bisacodyl (DULCOLAX) suppository 10 mg  10 mg Rectal DAILY PRN    albuterol (PROVENTIL VENTOLIN) nebulizer solution 2.5 mg  2.5 mg Nebulization Q4H PRN    arformoterol (BROVANA) neb solution 15 mcg  15 mcg Nebulization BID RT    And    budesonide (PULMICORT) 500 mcg/2 ml nebulizer suspension  500 mcg Nebulization BID RT    amLODIPine (NORVASC) tablet 10 mg  10 mg Oral DAILY    HYDROmorphone (DILAUDID) injection 2 mg  2 mg IntraVENous Q4H PRN    piperacillin-tazobactam (ZOSYN) 3.375 g in 0.9% sodium chloride (MBP/ADV) 100 mL  3.375 g IntraVENous Q12H    hydrALAZINE (APRESOLINE) 20 mg/mL injection 10 mg  10 mg IntraVENous Q4H PRN    vancomycin dosing per pharmacy   Other Rx Dosing/Monitoring     ______________________________________________________________________  EXPECTED LENGTH OF STAY: - - -  ACTUAL LENGTH OF STAY:          1                 Sampson Bobo MD

## 2018-05-13 NOTE — CONSULTS
[address]    Louisa Thakkar.  MR#: [id number]  : 1985  ACCOUNT #: [de-identified]   DATE OF SERVICE: 2018    REFERRING PHYSICIAN:  Niaf Ríos MD    REASON FOR CONSULTATION:  Management of ESRD. HISTORY OF PRESENT ILLNESS:  The patient is a 71-year-old female well known to us who has history of ESRD, on hemodialysis. She runs at [facility] on a TTS schedule. She presented with chief complaint of lower left abdominal pain for the last couple of days, moderate to severe in nature. No nausea or vomiting. She had some low-grade fever. Denies any diarrhea or blood in stool. Appetite has decreased last few days. CT of the abdomen and pelvis done in the ED showed increase in the size of lower anterior abdominal collection and anasarca with right greater than left pleural effusions. She had a complex medical history with history of peritonitis complicated by sepsis and then had intraabdominal infections requiring surgery and drain. She was recently discharged from last hospitalization and her abdominal pain were removed as outpatient. She also has a history of hypertension, lupus, asthma, dyslipidemia and history of PE. She had not taken her Eliquis for the last couple days. She underwent surgery and has an intra-abdominal drain in place. She is feeling a little better. She is started on clear liquid diet. She was due for dialysis yesterday. I had spoken to [facility] they will back up with their schedule. She is planned to get dialysis today. She denies any chest pain or worsening dyspnea. HOME MEDICATIONS:  Included Elavil 25 mg at bedtime p.r.n., Norvasc 5 mg daily, MiraLax, prednisone 5 mg daily, BREO inhaler 1 puff daily, gemfibrozil 300 mg daily, Coreg 12.5 mg b.i.d., azathioprine 50 mg daily, albuterol p.r.n., Plaquenil 200 mg b.i.d., vitamin B12 one tablet daily, Protonix 40 mg daily, Eliquis 5 mg b.i.d.     ALLERGIES: SHE IS ALLERGIC TO COMPAZINE, WHICH CAUSES NAUSEA AND VOMITING. FAMILY HISTORY:  Notable for diabetes and hypertension in her father. SOCIAL HISTORY:  She resides at home. Does not smoke or drink alcohol. PHYSICAL EXAMINATION:  GENERAL:  A young female who appears ill, but in no acute distress. VITAL SIGNS:  She is afebrile, pulse 99 down from 106-110. BP is also improved down to 121/85, respiration of 18, saturating 95% on 3 liters nasal cannula, weight is 65.7 kilo. HEENT:  Atraumatic, normocephalic. Conjunctivae are pale. No scleral icterus. Mucous membranes are moist.  NECK:  No JVD. LUNGS:  Have diminished breath sounds at both bases. CARDIOVASCULAR:  Regular rate and rhythm, normal S1, S2, no rub. ABDOMEN:  Soft, intra-abdominal drain in place, bowel sounds present. EXTREMITIES:  Has trace edema. CENTRAL NERVOUS SYSTEM:  Alert and oriented x3. LABORATORY DATA:  Reviewed. CBC shows anemia with a hemoglobin of 8.5, leukopenia with a WBC of 2.8, BUN 34, creatinine of 6.6, potassium of 4.9, phosphorus of 5.4, albumin of 1.8, LDH of 287. ASSESSMENT:  1.  End-stage renal disease, on hemodialysis. 2.  Intra-abdominal abscess with recent peritonitis with increase in the size on followup CT scan. 3.  Hypertension, which was uncontrolled, but improved now. 4. SLE.  5.  History of pulmonary embolism. 6.  Anemia of chronic disease. RECOMMENDATIONS:  1. The patient is planned to get HD today. Subsequently, we will do her next HD on Tuesday to bring her back to TTS schedule. 2.  Because of her pleural effusions we will try to do 3 to 3-1/2 kilo ultrafiltration as tolerated. 3.  Continue home meds for hypertension. Expect BP to improve after ultrafiltration. IV antibiotics per primary team.   4.  Imuran and Plaquenil are temporarily on hold.   5.  If she has unexplained treatment resistant hypotension, would consider stress dose steroids because she is on chronic prednisone. Thanks for renal consultation. We will follow the patient with you.       Debra Ruiz MD       BP / LN  D: 05/13/2018 15:50     T: 05/13/2018 16:17  JOB #: [id number]

## 2018-05-14 ENCOUNTER — HOME CARE VISIT (OUTPATIENT)
Dept: HOME HEALTH SERVICES | Facility: HOME HEALTH | Age: 33
End: 2018-05-14

## 2018-05-14 LAB
ALBUMIN SERPL-MCNC: 1.7 G/DL (ref 3.5–5)
ANION GAP SERPL CALC-SCNC: 9 MMOL/L (ref 5–15)
BASOPHILS # BLD: 0 K/UL (ref 0–0.1)
BASOPHILS NFR BLD: 1 % (ref 0–1)
BUN SERPL-MCNC: 17 MG/DL (ref 6–20)
BUN/CREAT SERPL: 4 (ref 12–20)
CALCIUM SERPL-MCNC: 8 MG/DL (ref 8.5–10.1)
CHLORIDE SERPL-SCNC: 104 MMOL/L (ref 97–108)
CO2 SERPL-SCNC: 27 MMOL/L (ref 21–32)
CREAT SERPL-MCNC: 4.36 MG/DL (ref 0.55–1.02)
DIFFERENTIAL METHOD BLD: ABNORMAL
EOSINOPHIL # BLD: 0.1 K/UL (ref 0–0.4)
EOSINOPHIL NFR BLD: 3 % (ref 0–7)
ERYTHROCYTE [DISTWIDTH] IN BLOOD BY AUTOMATED COUNT: 14.7 % (ref 11.5–14.5)
GLUCOSE SERPL-MCNC: 98 MG/DL (ref 65–100)
HCT VFR BLD AUTO: 29 % (ref 35–47)
HGB BLD-MCNC: 8.5 G/DL (ref 11.5–16)
IMM GRANULOCYTES # BLD: 0 K/UL (ref 0–0.04)
IMM GRANULOCYTES NFR BLD AUTO: 0 % (ref 0–0.5)
LYMPHOCYTES # BLD: 0.6 K/UL (ref 0.8–3.5)
LYMPHOCYTES NFR BLD: 22 % (ref 12–49)
MAGNESIUM SERPL-MCNC: 1.8 MG/DL (ref 1.6–2.4)
MCH RBC QN AUTO: 26.6 PG (ref 26–34)
MCHC RBC AUTO-ENTMCNC: 29.3 G/DL (ref 30–36.5)
MCV RBC AUTO: 90.6 FL (ref 80–99)
MONOCYTES # BLD: 0.2 K/UL (ref 0–1)
MONOCYTES NFR BLD: 6 % (ref 5–13)
NEUTS SEG # BLD: 1.9 K/UL (ref 1.8–8)
NEUTS SEG NFR BLD: 68 % (ref 32–75)
NRBC # BLD: 0 K/UL (ref 0–0.01)
NRBC BLD-RTO: 0 PER 100 WBC
PHOSPHATE SERPL-MCNC: 3.4 MG/DL (ref 2.6–4.7)
PLATELET # BLD AUTO: 240 K/UL (ref 150–400)
PMV BLD AUTO: 9.8 FL (ref 8.9–12.9)
POTASSIUM SERPL-SCNC: 3.8 MMOL/L (ref 3.5–5.1)
RBC # BLD AUTO: 3.2 M/UL (ref 3.8–5.2)
SODIUM SERPL-SCNC: 140 MMOL/L (ref 136–145)
WBC # BLD AUTO: 2.7 K/UL (ref 3.6–11)

## 2018-05-14 PROCEDURE — 74011000250 HC RX REV CODE- 250: Performed by: INTERNAL MEDICINE

## 2018-05-14 PROCEDURE — 74011250637 HC RX REV CODE- 250/637: Performed by: INTERNAL MEDICINE

## 2018-05-14 PROCEDURE — 74011636637 HC RX REV CODE- 636/637: Performed by: INTERNAL MEDICINE

## 2018-05-14 PROCEDURE — 65660000000 HC RM CCU STEPDOWN

## 2018-05-14 PROCEDURE — 85025 COMPLETE CBC W/AUTO DIFF WBC: CPT | Performed by: INTERNAL MEDICINE

## 2018-05-14 PROCEDURE — 3331090002 HH PPS REVENUE DEBIT

## 2018-05-14 PROCEDURE — 74011000258 HC RX REV CODE- 258: Performed by: INTERNAL MEDICINE

## 2018-05-14 PROCEDURE — 36415 COLL VENOUS BLD VENIPUNCTURE: CPT | Performed by: INTERNAL MEDICINE

## 2018-05-14 PROCEDURE — 74011250636 HC RX REV CODE- 250/636: Performed by: INTERNAL MEDICINE

## 2018-05-14 PROCEDURE — 80069 RENAL FUNCTION PANEL: CPT | Performed by: INTERNAL MEDICINE

## 2018-05-14 PROCEDURE — 94640 AIRWAY INHALATION TREATMENT: CPT

## 2018-05-14 PROCEDURE — 83735 ASSAY OF MAGNESIUM: CPT | Performed by: INTERNAL MEDICINE

## 2018-05-14 PROCEDURE — 93970 EXTREMITY STUDY: CPT

## 2018-05-14 PROCEDURE — 93971 EXTREMITY STUDY: CPT

## 2018-05-14 PROCEDURE — 3331090001 HH PPS REVENUE CREDIT

## 2018-05-14 RX ORDER — LIDOCAINE 50 MG/G
1 PATCH TOPICAL EVERY 24 HOURS
Status: DISCONTINUED | OUTPATIENT
Start: 2018-05-14 | End: 2018-05-18 | Stop reason: HOSPADM

## 2018-05-14 RX ORDER — FENTANYL CITRATE 50 UG/ML
50 INJECTION, SOLUTION INTRAMUSCULAR; INTRAVENOUS
Status: DISCONTINUED | OUTPATIENT
Start: 2018-05-14 | End: 2018-05-18 | Stop reason: HOSPADM

## 2018-05-14 RX ADMIN — HYDROMORPHONE HYDROCHLORIDE 2 MG: 2 INJECTION INTRAMUSCULAR; INTRAVENOUS; SUBCUTANEOUS at 02:49

## 2018-05-14 RX ADMIN — CARVEDILOL 12.5 MG: 12.5 TABLET, FILM COATED ORAL at 08:34

## 2018-05-14 RX ADMIN — CARVEDILOL 12.5 MG: 12.5 TABLET, FILM COATED ORAL at 16:15

## 2018-05-14 RX ADMIN — ARFORMOTEROL TARTRATE 15 MCG: 15 SOLUTION RESPIRATORY (INHALATION) at 08:10

## 2018-05-14 RX ADMIN — GEMFIBROZIL 300 MG: 600 TABLET ORAL at 08:34

## 2018-05-14 RX ADMIN — PIPERACILLIN SODIUM,TAZOBACTAM SODIUM 3.38 G: 3; .375 INJECTION, POWDER, FOR SOLUTION INTRAVENOUS at 02:49

## 2018-05-14 RX ADMIN — ALLOPURINOL 100 MG: 100 TABLET ORAL at 08:34

## 2018-05-14 RX ADMIN — BUDESONIDE 500 MCG: 0.5 INHALANT RESPIRATORY (INHALATION) at 21:34

## 2018-05-14 RX ADMIN — FENTANYL CITRATE 50 MCG: 50 INJECTION, SOLUTION INTRAMUSCULAR; INTRAVENOUS at 11:14

## 2018-05-14 RX ADMIN — BUDESONIDE 500 MCG: 0.5 INHALANT RESPIRATORY (INHALATION) at 08:10

## 2018-05-14 RX ADMIN — PIPERACILLIN SODIUM,TAZOBACTAM SODIUM 3.38 G: 3; .375 INJECTION, POWDER, FOR SOLUTION INTRAVENOUS at 14:07

## 2018-05-14 RX ADMIN — AMLODIPINE BESYLATE 10 MG: 5 TABLET ORAL at 08:34

## 2018-05-14 RX ADMIN — ACETAMINOPHEN 650 MG: 325 TABLET, FILM COATED ORAL at 23:16

## 2018-05-14 RX ADMIN — FENTANYL CITRATE 50 MCG: 50 INJECTION, SOLUTION INTRAMUSCULAR; INTRAVENOUS at 17:57

## 2018-05-14 RX ADMIN — FENTANYL CITRATE 50 MCG: 50 INJECTION, SOLUTION INTRAMUSCULAR; INTRAVENOUS at 07:07

## 2018-05-14 RX ADMIN — PREDNISONE 5 MG: 5 TABLET ORAL at 08:34

## 2018-05-14 RX ADMIN — HYDROCODONE BITARTRATE AND ACETAMINOPHEN 1 TABLET: 10; 325 TABLET ORAL at 16:15

## 2018-05-14 RX ADMIN — HYDROCODONE BITARTRATE AND ACETAMINOPHEN 1 TABLET: 10; 325 TABLET ORAL at 20:25

## 2018-05-14 RX ADMIN — HYDROCODONE BITARTRATE AND ACETAMINOPHEN 1 TABLET: 10; 325 TABLET ORAL at 09:47

## 2018-05-14 RX ADMIN — PANTOPRAZOLE SODIUM 40 MG: 40 TABLET, DELAYED RELEASE ORAL at 07:03

## 2018-05-14 RX ADMIN — FENTANYL CITRATE 50 MCG: 50 INJECTION, SOLUTION INTRAMUSCULAR; INTRAVENOUS at 14:07

## 2018-05-14 RX ADMIN — VANCOMYCIN HYDROCHLORIDE 500 MG: 500 INJECTION, POWDER, LYOPHILIZED, FOR SOLUTION INTRAVENOUS at 00:05

## 2018-05-14 RX ADMIN — FENTANYL CITRATE 50 MCG: 50 INJECTION, SOLUTION INTRAMUSCULAR; INTRAVENOUS at 21:19

## 2018-05-14 RX ADMIN — ARFORMOTEROL TARTRATE 15 MCG: 15 SOLUTION RESPIRATORY (INHALATION) at 21:34

## 2018-05-14 NOTE — PROGRESS NOTES
HISTORY OF PRESENT ILLNESS  Marlo Lorenzo is a 29 y.o. female. HPI   Chief Complaint   Patient presents with    Surgical Follow-up     3.5 weeks s/p diag lap withabd washout by Dr Macario Webb. Marlo Lorenzo is a 29 y.o. female presents today for JENNIFER drain removal.  She is now 3+ weeks s/p diagnostic laparocsopy with abdominal washout for peritonitis / infected PD shunt. She reports the drain \"isn't putting out very much\" and < 30 ml per day the past several days. Drain fluid has been \"watery and little bloody\". Review of Systems   Constitutional: Positive for malaise/fatigue (no energy since being in the hospital ). Negative for chills and fever. Dialysis T/Th/Sat    Respiratory: Positive for shortness of breath (since the hospital ). Negative for cough. Cardiovascular: Negative for chest pain, palpitations and leg swelling. Gastrointestinal: Positive for abdominal pain (mild pain, \"better than it was\") and nausea. Negative for constipation, diarrhea and vomiting. Genitourinary:        Hemo Dialysis    Musculoskeletal: Positive for myalgias. Neurological: Negative for dizziness. Physical Exam   Constitutional: She is oriented to person, place, and time. No distress. /86  Pulse 90  Temp 99.6 °F (37.6 °C)  Resp 20  Ht 5' 5\" (1.651 m)  Wt 144 lb 8 oz (65.5 kg)  SpO2 92%  BMI 24.05 kg/m2   AA frail appearing female here with adult male family member    Cardiovascular: Normal rate and regular rhythm. Pulmonary/Chest: No respiratory distress. Diminished bases   No rales   No wheezing    Abdominal: Soft. Bowel sounds are normal. She exhibits no distension. There is tenderness (minimal at lap sites ). Drain left mid abd with thin serous drainage; < 20 ml in bulb   Lap sites well healed    Musculoskeletal:   Ambulating independently    Neurological: She is alert and oriented to person, place, and time. Skin: She is not diaphoretic.    Psychiatric:   Pleasant ASSESSMENT and PLAN    ICD-10-CM ICD-9-CM    1. Follow-up examination following surgery Z09 V67.00      3+ weeks s/p diagnostic lap with abdominal wash out   JENNIFER drain removed without incident and she was \"relieved\" no more drains   Has follow up with renal and Dialysis tomorrow   Surgical follow up as needed   If fevers, increased abdominal pain she is to return for evaluation   Stew Jin verbalized understanding and questions were answered to the best of my knowledge and ability. Self care  educational materials were provided.

## 2018-05-14 NOTE — PROGRESS NOTES
Problem: Falls - Risk of  Goal: *Absence of Falls  Document Alex Fall Risk and appropriate interventions in the flowsheet.    Outcome: Progressing Towards Goal  Fall Risk Interventions:  Mobility Interventions: Patient to call before getting OOB         Medication Interventions: Teach patient to arise slowly, Patient to call before getting OOB    Elimination Interventions: Call light in reach, Toileting schedule/hourly rounds    History of Falls Interventions: Evaluate medications/consider consulting pharmacy

## 2018-05-14 NOTE — PROCEDURES
[facility]  *** FINAL REPORT ***    Name: Hilda Weir  MRN: [id number]    Inpatient  : 1985  HIS Order #: [id number]  [id number] [facility] Visit #: [id number]  Date: 14 May 2018    TYPE OF TEST: Peripheral Venous Testing    REASON FOR TEST  Pain in limb, Limb swelling    Right Leg:-  Deep venous thrombosis:           No  Superficial venous thrombosis:    No  Deep venous insufficiency:        Not examined  Superficial venous insufficiency: Not examined    Left Leg:-  Deep venous thrombosis:           No  Superficial venous thrombosis:    No  Deep venous insufficiency:        Not examined  Superficial venous insufficiency: Not examined      INTERPRETATION/FINDINGS  PROCEDURE:  Color duplex ultrasound imaging of lower extremity veins. FINDINGS:       Right: The common femoral, deep femoral, femoral, popliteal,  posterior tibial, peroneal, and great saphenous are patent and without   evidence of thrombus;  each is fully compressible and there is no  narrowing of the flow channel on color Doppler imaging. Phasic flow  is observed in the common femoral vein. Left:   The common femoral, deep femoral, femoral, popliteal,  posterior tibial, peroneal, and great saphenous are patent and without   evidence of thrombus;  each is fully compressible and there is no  narrowing of the flow channel on color Doppler imaging. Phasic flow  is observed in the common femoral vein. IMPRESSION:  No evidence of right or left lower extremity vein  thrombosis. ADDITIONAL COMMENTS    I have personally reviewed the data relevant to the interpretation of  this  study. TECHNOLOGIST: Fernando Bridges  Signed: 2018 09:25 AM    PHYSICIAN: Arnie Riddle MD  Signed: 2018 02:59 PM

## 2018-05-14 NOTE — PROCEDURES
[facility] Dialysis Team [facility]  [phone]    Vitals   Pre   Post   Assessment   Pre   Post     Temp  Temp: 98.8 °F (37.1 °C) (05/13/18 1945)  98.9 LOC  A&OX4 A&OX4   HR   Pulse (Heart Rate): 87 (05/13/18 1945) 92 Lungs   diminished  diminished   B/P   BP: 114/81 (05/13/18 1945) 145/95 Cardiac   NSR  NSR   Resp   Resp Rate: 20 (05/13/18 1945) 16 Skin   iintact  intact   Pain level  Pain Intensity 1: 7 (05/13/18 0756) 0 Edema    2+GUILLERMO&1+UEE   same   Orders:    Duration:   Start:   1945 End:   1747 Total:   3.5 hours   Dialyzer:   Dialyzer/Set Up Inspection: Anamika Riddle (05/13/18 1945)   K Bath:   Dialysate K (mEq/L): 3 (05/13/18 1945)   Ca Bath:   Dialysate CA (mEq/L): 2.5 (05/13/18 1945)   Na/Bicarb:   Dialysate NA (mEq/L): 140 (05/13/18 1945)   Target Fluid Removal:   Goal/Amount of Fluid to Remove (mL): 3000 mL (05/13/18 1945)   Access     Type & Location:   PETER AVG, 15 gauge needles used. Post tx: Sites held 10 minutes, bleedng stopped. Labs     Obtained/Reviewed   Critical Results Called   Date when labs were drawn-  Hgb-    HGB   Date Value Ref Range Status   05/13/2018 8.5 (L) 11.5 - 16.0 g/dL Final     K-    Potassium   Date Value Ref Range Status   05/13/2018 4.9 3.5 - 5.1 mmol/L Final   05/13/2018 4.9 3.5 - 5.1 mmol/L Final     Ca-   Calcium   Date Value Ref Range Status   05/13/2018 8.2 (L) 8.5 - 10.1 MG/DL Final   05/13/2018 8.3 (L) 8.5 - 10.1 MG/DL Final     Bun-   BUN   Date Value Ref Range Status   05/13/2018 35 (H) 6 - 20 MG/DL Final   05/13/2018 34 (H) 6 - 20 MG/DL Final     Creat-   Creatinine   Date Value Ref Range Status   05/13/2018 6.57 (H) 0.55 - 1.02 MG/DL Final   05/13/2018 6.60 (H) 0.55 - 1.02 MG/DL Final        Medications/ Blood Products Given     Name   Dose   Route and Time     none                Blood Volume Processed (BVP):    71 liters Net Fluid   Removed:  3500ml   Comments Patient tolerated tx well, no complaints during or after.  Her BP was in 90s before start of tx, so origional goal was set at 2.5 kg, but was raised to 3.5 as patients BP came up diring tx. Report given to [facility] nurse,   Time Out Done: yes, 1945  Primary Nurse Rpt Pre: Frank Walsh RN  Primary Nurse Rpt PostJerry Rater RN  Pt Education: fluid restriction  Care Plan: HD with volume removal  Tx Summary: 3.5 hours, 2 K 2.5 Ca removed 3500 ml. Admiting Diagnosis:  Pt's previous clinic-[facility]  Consent signed - Informed Consent Verified: Yes (05/13/18 1945)  [facility] Consent - yes  Hepatitis Status- antigen negative 4-29-18 in [facility]  Machine #- Machine Number: [id number] (05/13/18 1945)  Telemetry status-yes  Pre-dialysis wt. -

## 2018-05-14 NOTE — PROGRESS NOTES
NUTRITION COMPLETE ASSESSMENT    RECOMMENDATIONS:   1. Consider liberalizing diet order to Regular, 2 gm Sodium    2. Continue daily weights via standing scale    3. Consider Nephrocap    Interventions/Plan:   Food/Nutrient Delivery: Nepro + will gladly add snacks prn    Assessment:   Reason for Assessment:   [x]BPA/MST Referral     Diet: Renal + Nepro   Nutritionally Significant Medications: [x] Reviewed & Includes: zofran q 4 hours prn; protonix daily; zosyn q 12 hours; miralax daily; prednisone daily; senna daily prn   Meal Intake: Patient Vitals for the past 100 hrs:   % Diet Eaten   05/13/18 1653 90 %     Pre-Hospitalization:  Usual Appetite: Good  Diet at Home: Regular + Nepro prn    Current Hospitalization:   Fluid Restriction:  NONE  Appetite: Good  PO Ability: Independent      Subjective:  Pt very pleasant and in good spirits. She states she's been eating almost all of her meals. Objective:  Chart reviewed, discussed with RN and team during interdisciplinary rounds. Pt admitted with abscess of the abdominal cavity. PMHx: Lupus, ESRD on HD, HF, hypercholesterolemia, GERD, others noted. Pt admits she thinks appetite at home had improved, but she does continue to feel weak and needs PT/OT in the home. Discussed her usual intake and the importance of protein intake. She does take Nepro at home, but usually no more than once/day. MD ordered q day while here (425 kcal, 19 g protein). Per pt, she has had no changes in her diet per outpatient RD recs. Weights are difficult to assess secondary to fluid status (she has fluctuated between 139-151 in just two weeks). Bowel regimen ordered for pt with constipation    Estimated Nutrition Needs:   Kcals/day: 1745 Kcals/day (9731-2415 kcal/day (MSJ x 1.3-1.4))  Protein: 76 g (76-82 g/day (1.2-1.3 g/kg))  Fluid: 1800 ml (1 mL/kcal)  Based On:  Inés Mcgovern  Weight Used: Actual wt (63.1 kg)    Pt expected to meet estimated nutrient needs:  [x] Yes    Nutrition Diagnosis:   1. Inadequate protein-energy intake related to increased energy expenditure, variable appetite as evidenced by 5# weight loss x 1 week, history of malnutrition    Goals:     Pt to consume at least 75% of meals and 1 Nepro per day over the next 5-7 days     Monitoring & Evaluation:    - Liquid meal replacement, Total energy intake    Previous Nutrition Goals Met:   N/A  Previous Recommendations:    N/A    Education & Discharge Needs:   [x] None Identified   [] Identified and addressed    [x] Participated in care plan, discharge planning, and/or interdisciplinary rounds        Cultural, Caodaism and ethnic food preferences identified:  NONE    Skin Integrity: [x]Intact  []Other  Edema: []None [x]Other: 3+  Last BM: 3/12  Food Allergies:  [x]None []Other    Anthropometrics:    Weight Loss Metrics 5/14/2018 5/9/2018 5/7/2018 4/30/2018 4/23/2018 4/20/2018 3/5/2018   Today's Wt 139 lb 1.8 oz 144 lb 144 lb 8 oz 151 lb 3.8 oz 159 lb 9.6 oz 161 lb 6 oz 150 lb   BMI 23.15 kg/m2 23.96 kg/m2 24.05 kg/m2 25.17 kg/m2 26.56 kg/m2 26.85 kg/m2 24.96 kg/m2      Weight Source: Bed  Height: 5' 5\" (165.1 cm),    Body mass index is 23.15 kg/(m^2).   IBW : 56.7 kg (125 lb),    Usual Body Weight: 65.3 kg (144 lb) (5/9),      Labs:  Lab Results   Component Value Date/Time    Sodium 140 05/14/2018 05:10 AM    Potassium 3.8 05/14/2018 05:10 AM    Chloride 104 05/14/2018 05:10 AM    CO2 27 05/14/2018 05:10 AM    Glucose 98 05/14/2018 05:10 AM    BUN 17 05/14/2018 05:10 AM    Creatinine 4.36 (H) 05/14/2018 05:10 AM    Calcium 8.0 (L) 05/14/2018 05:10 AM    Magnesium 1.8 05/14/2018 05:10 AM    Phosphorus 3.4 05/14/2018 05:10 AM    Albumin 1.7 (L) 05/14/2018 05:10 AM     Lab Results   Component Value Date/Time    Hemoglobin A1c 5.4 09/25/2015 02:02 PM     Neeta Alvares, [address] [facility]

## 2018-05-14 NOTE — PROCEDURES
[facility]  *** FINAL REPORT ***    Name: Bianca Cornejo  MRN: [id number]    Inpatient  : 1985  HIS Order #: [id number]  [id number] [facility] Visit #: [id number]  Date: 14 May 2018    TYPE OF TEST: Peripheral Venous Testing    REASON FOR TEST  Pain in limb, Limb swelling    Right Arm:-  Deep venous thrombosis:           No  Superficial venous thrombosis:    No      INTERPRETATION/FINDINGS  PROCEDURE:  Color duplex ultrasound imaging of upper extremity veins. FINDINGS:       Right:  The internal jugular, subclavian, axillary, brachial,  basilic, and cephalic veins are patent and without evidence of  thrombus;  each is fully compressible and there is no narrowing of the   flow channel on color Doppler imaging. Phasic/pulsatile flow is  observed in the subclavian vein. Left:    The subclavian vein is patent and without evidence of  thrombus. Phasic/pulsatile flow is observed. This side was not  otherwise evaluated. IMPRESSION:  No evidence of right upper extremity vein thrombosis. ADDITIONAL COMMENTS    I have personally reviewed the data relevant to the interpretation of  this  study. TECHNOLOGIST: Wilda Hou.  Edsel Babinski  Signed: 2018 09:24 AM    PHYSICIAN: Chuck Menard MD  Signed: 2018 03:00 PM

## 2018-05-14 NOTE — PROGRESS NOTES
0015-s/p hemodialysis tonite,no unt s/s  Bedside shift change report given to Tong (oncoming nurse) by JOHN Branham (offgoing nurse). Report included the following information SBAR, Kardex, MAR and Recent Results.

## 2018-05-14 NOTE — PROGRESS NOTES
Patient name: Delmis Enciso  MRN: [id number]    Nephrology Progress note:    Assessment:  ESRD: on HD [facility]  Recent hx of E. Coli Bacteremia/ E. Coli peritonitis. Increasing intra-abd fluid collection. CT guided Drain placed back in by IR 5/13   Loculated right pleural effusion-> s/p ultrasound guided drainage  HTN:stable  Hx of PE: on Eliquis  Lupus: Anemia 2 to ESRD/Lupus: Hgb remains below goal - on EPO  SHPT:   Protein malnutrition  Edema: 3rd spacing-> increase UF on HD as tolerated       Plan/Recommendations:  HD tomorrow  F/u fluid cx  IV Abx  Liberate diet  Nepro  Am labs      Subjective:  Still having some abd pain. CT guided Intrabd drain placed by IR yesterday. Afebrile. HD yesterday. ROS:   No nausea, no vomiting  No chest pain, no shortness of breath    Exam:  Visit Vitals    /84 (BP 1 Location: Left arm, BP Patient Position: At rest;Sitting)    Pulse 90    Temp 98.7 °F (37.1 °C)    Resp 24    Ht 5' 5\" (1.651 m)    Wt 63.1 kg (139 lb 1.8 oz)    SpO2 99%    BMI 23.15 kg/m2     Wt Readings from Last 3 Encounters:   05/14/18 63.1 kg (139 lb 1.8 oz)   05/09/18 65.3 kg (144 lb)   05/07/18 65.5 kg (144 lb 8 oz)       Intake/Output Summary (Last 24 hours) at 05/14/18 1436  Last data filed at 05/14/18 0442   Gross per 24 hour   Intake              687 ml   Output             3950 ml   Net            -3263 ml       Gen: NAD  HEENT: No icterus, dry mm, no oral exudate, AT/NC  Lungs/Chest wall: Clear.    Cardiovascular: Regular rate, normal rhythm. Abdomen/: Soft, Abd drain  Ext: No clubbing, No cyanosis. Trace RLE peripheral edema  CNS: alert and awake.  Answers appropriately      Current Facility-Administered Medications   Medication Dose Route Frequency Last Dose    fentaNYL citrate (PF) injection 50 mcg  50 mcg IntraVENous Q3H PRN 50 mcg at 05/14/18 1407    lidocaine (LIDODERM) 5 % patch 1 Patch  1 Patch TransDERmal Q24H 1 Patch at 05/14/18 1344    [START ON 5/15/2018] vancomycin (VANCOCIN) 500 mg in 0.9% sodium chloride (MBP/ADV) 100 mL  500 mg IntraVENous Q TU, TH & SAT      epoetin pia (EPOGEN;PROCRIT) injection 10,000 Units  10,000 Units SubCUTAneous Q MON, WED & FRI      allopurinol (ZYLOPRIM) tablet 100 mg  100 mg Oral DAILY AFTER BREAKFAST 100 mg at 05/14/18 0834    amitriptyline (ELAVIL) tablet 25 mg  25 mg Oral QHS PRN      carvedilol (COREG) tablet 12.5 mg  12.5 mg Oral BID WITH MEALS 12.5 mg at 05/14/18 0834    gemfibrozil (LOPID) tablet 300 mg  300 mg Oral DAILY 300 mg at 05/14/18 0834    pantoprazole (PROTONIX) tablet 40 mg  40 mg Oral ACB 40 mg at 05/14/18 0703    polyethylene glycol (MIRALAX) packet 17 g  17 g Oral DAILY      predniSONE (DELTASONE) tablet 5 mg  5 mg Oral DAILY 5 mg at 05/14/18 0834    senna (SENOKOT) tablet 8.6 mg  1 Tab Oral DAILY PRN      acetaminophen (TYLENOL) tablet 650 mg  650 mg Oral Q4H  mg at 05/12/18 1640    HYDROcodone-acetaminophen (NORCO)  mg tablet 1 Tab  1 Tab Oral Q4H PRN 1 Tab at 05/14/18 0947    ondansetron (ZOFRAN) injection 4 mg  4 mg IntraVENous Q4H PRN      bisacodyl (DULCOLAX) tablet 5 mg  5 mg Oral DAILY PRN      bisacodyl (DULCOLAX) suppository 10 mg  10 mg Rectal DAILY PRN      albuterol (PROVENTIL VENTOLIN) nebulizer solution 2.5 mg  2.5 mg Nebulization Q4H PRN      arformoterol (BROVANA) neb solution 15 mcg  15 mcg Nebulization BID RT 15 mcg at 05/14/18 0810    And    budesonide (PULMICORT) 500 mcg/2 ml nebulizer suspension  500 mcg Nebulization BID RT 500 mcg at 05/14/18 0810    amLODIPine (NORVASC) tablet 10 mg  10 mg Oral DAILY 10 mg at 05/14/18 0834    piperacillin-tazobactam (ZOSYN) 3.375 g in 0.9% sodium chloride (MBP/ADV) 100 mL  3.375 g IntraVENous Q12H 3.375 g at 05/14/18 1407    hydrALAZINE (APRESOLINE) 20 mg/mL injection 10 mg  10 mg IntraVENous Q4H PRN      vancomycin dosing per pharmacy   Other Rx Dosing/Monitoring         Labs/Data:    Lab Results   Component Value Date/Time    WBC 2.7 (L) 05/14/2018 05:10 AM    Hemoglobin (POC) 7.8 (L) 01/19/2018 12:30 PM    HGB 8.5 (L) 05/14/2018 05:10 AM    Hematocrit (POC) 23 (L) 01/19/2018 12:30 PM    HCT 29.0 (L) 05/14/2018 05:10 AM    PLATELET 942 69/71/8824 05:10 AM    MCV 90.6 05/14/2018 05:10 AM       Lab Results   Component Value Date/Time    Sodium 140 05/14/2018 05:10 AM    Potassium 3.8 05/14/2018 05:10 AM    Chloride 104 05/14/2018 05:10 AM    CO2 27 05/14/2018 05:10 AM    Anion gap 9 05/14/2018 05:10 AM    Glucose 98 05/14/2018 05:10 AM    BUN 17 05/14/2018 05:10 AM    Creatinine 4.36 (H) 05/14/2018 05:10 AM    BUN/Creatinine ratio 4 (L) 05/14/2018 05:10 AM    GFR est AA 14 (L) 05/14/2018 05:10 AM    GFR est non-AA 12 (L) 05/14/2018 05:10 AM    Calcium 8.0 (L) 05/14/2018 05:10 AM       Patient seen and examined. Chart reviewed. Labs, data and other pertinent notes reviewed in last 24 hrs.     Discussed with patient    Signed by:  Dinh Vanegas MD

## 2018-05-14 NOTE — HOME CARE
This patient is currently on service with [address] [facility]. Patient is currently receiving SN/PT/OT services. Resumption of care orders will be needed prior to discharge to resume these services.  Devendra Hui RN [phone]

## 2018-05-14 NOTE — CDMP QUERY
There is noted documentation of H/O VTE in the medical record. Please clarify if this condition could be further specified as:      =>Chronic DVT in the setting of known history requiring resumption of Eliquis when able. =>Personal History of DVT no longer being treated, monitor only. =>Acute DVT in the setting of known history requiring resumption of Eliquis when able. =>Acute Recurrent DVT DVT in the setting of known history requiring resumption of Eliquis when able. =>Other Explanation of clinical findings  =>Unable to Determine (no explanation of clinical findings)    The medical record reflects the following risk factors, clinical indicators, and treatment    Risk Factors:  history of DVT/PE, on Eliquis  Clinical Indicators:  History of DVT? PE  Treatment: Eliquis    Reference:  Acute DVT:  \"Acute\" defines the period of time beginning with the initial diagnosis, up to and including the entire period of time where anticoagulation is instituted (3-12 months)     Acute Recurrent DVT: \"Recurrent\" incorporates the definition of acute with the diagnosis of recurrent and ends 3-12 months beyond that time. These patients will likely require lifelong anticoagulation therapy. Chronic DVT:  \"Chronic\" treatment period extending beyond initial 12 months of treatment. Medical condition still exists and is actively being treated. Personal History of DVT: explains the patient's past medical condition that no longer exists, and is not receiving any treatment, but that has the potential for recurrence, and therefore may require monitoring    Please clarify and document your clinical opinion in the progress notes and discharge summary including the definitive and/or presumptive diagnosis, (suspected or probable), related to the above clinical findings. Please include clinical findings supporting your diagnosis.     Thank you,  Jack Cuellar, RN  [phone]

## 2018-05-14 NOTE — PROGRESS NOTES
Bedside shift change report given to Alley Murdock (oncoming nurse) by Meghan Garland (offgoing nurse). Report included the following information SBAR, Kardex, Intake/Output, MAR, Accordion, Recent Results, Med Rec Status and Cardiac Rhythm NSR.

## 2018-05-14 NOTE — PROGRESS NOTES
General Surgery Daily Progress Note    Admit Date: 2018  Post-Operative Day: * No surgery found * from * No surgery found *     Subjective:     Last 24 hrs: Pt reports that her abd pain is still present, although improved. Stomach is softer. Pain in lower mid-abd and epigastric to RUQ. Tolerating diet. +flatus, last BM yesterday. Pt reports that she is getting a chest tube placed today. Cultures pending. On IV Zosyn and Vanc. Objective:     Blood pressure 130/88, pulse 98, temperature 99.5 °F (37.5 °C), resp. rate 25, height 5' 5\" (1.651 m), weight 139 lb 1.8 oz (63.1 kg), SpO2 99 %. Temp (24hrs), Av.8 °F (37.1 °C), Min:97.8 °F (36.6 °C), Max:99.5 °F (37.5 °C)      _____________________  Physical Exam:     Alert and Oriented, sitting up in bed with food tray, in no acute distress. Cardiovascular: RRR, GUILLERMO peripheral edema  Lungs:Clear, RLL diminished  Abdomen: soft except for low-mid abd where pretty TTP. TTP in RUQ and epigastric area.  +BS  IR drain with scant SS output      Assessment:   Principal Problem:    Abscess of abdominal cavity ([address] (2018)            Plan:     Cont with current plan IV Zosyn and Vanc  Will need a repeat CT scan in 1-2 days, per Dr. Oneida Archer as per primary hospitalist team  Further surgical plan as per Dr. Noman Ivey  following    Data Review:    Recent Labs      18   0510  18   0647  18   1131   WBC  2.7*  2.8*  3.0*   HGB  8.5*  8.5*  9.5*   HCT  29.0*  29.2*  31.9*   PLT  240  270  282     Recent Labs      18   0510  18   0951  18   0647  18   1131   NA  140   --   137  137  138   K  3.8   --   4.9  4.9  4.6   CL  104   --   102  100  99   CO2  27   --   26  31  31   GLU  98   --   82  90  79   BUN  17   --   34*  35*  30*   CREA  4.36*   --   6.60*  6.57*  5.93*   CA  8.0*   --   8.3*  8.2*  8.4*   MG  1.8   --    --    --    PHOS  3.4   --   5.4*   --    ALB  1.7*   --   1.8*  2.0* SGOT   --    --    --   28   ALT   --    --    --   11*   INR   --   1.1   --    --      Recent Labs      05/12/18   1131   LPSE  229           ______________________  Medications:    Current Facility-Administered Medications   Medication Dose Route Frequency    fentaNYL citrate (PF) injection 50 mcg  50 mcg IntraVENous Q3H PRN    epoetin pia (EPOGEN;PROCRIT) injection 10,000 Units  10,000 Units SubCUTAneous Q MON, WED & FRI    allopurinol (ZYLOPRIM) tablet 100 mg  100 mg Oral DAILY AFTER BREAKFAST    amitriptyline (ELAVIL) tablet 25 mg  25 mg Oral QHS PRN    carvedilol (COREG) tablet 12.5 mg  12.5 mg Oral BID WITH MEALS    gemfibrozil (LOPID) tablet 300 mg  300 mg Oral DAILY    pantoprazole (PROTONIX) tablet 40 mg  40 mg Oral ACB    polyethylene glycol (MIRALAX) packet 17 g  17 g Oral DAILY    predniSONE (DELTASONE) tablet 5 mg  5 mg Oral DAILY    senna (SENOKOT) tablet 8.6 mg  1 Tab Oral DAILY PRN    acetaminophen (TYLENOL) tablet 650 mg  650 mg Oral Q4H PRN    HYDROcodone-acetaminophen (NORCO)  mg tablet 1 Tab  1 Tab Oral Q4H PRN    ondansetron (ZOFRAN) injection 4 mg  4 mg IntraVENous Q4H PRN    bisacodyl (DULCOLAX) tablet 5 mg  5 mg Oral DAILY PRN    bisacodyl (DULCOLAX) suppository 10 mg  10 mg Rectal DAILY PRN    albuterol (PROVENTIL VENTOLIN) nebulizer solution 2.5 mg  2.5 mg Nebulization Q4H PRN    arformoterol (BROVANA) neb solution 15 mcg  15 mcg Nebulization BID RT    And    budesonide (PULMICORT) 500 mcg/2 ml nebulizer suspension  500 mcg Nebulization BID RT    amLODIPine (NORVASC) tablet 10 mg  10 mg Oral DAILY    piperacillin-tazobactam (ZOSYN) 3.375 g in 0.9% sodium chloride (MBP/ADV) 100 mL  3.375 g IntraVENous Q12H    hydrALAZINE (APRESOLINE) 20 mg/mL injection 10 mg  10 mg IntraVENous Q4H PRN    vancomycin dosing per pharmacy   Other Rx Dosing/Monitoring       Giovanna Rondon NP  5/14/2018      I have independently examined the patient and have reviewed the chart. I agree with the above plan. Patient s/p CT guided drain placement yesterday. Output mostly serous. Cultures pending. She states abdominal pain is stable. No other complaints currently. Would continue drain to bulb suction and IV abx. Follow cultures. Plan for repeat CT later this week to assess for abscess resolution.       Yariel Santana MD  5/14/2018  2:07 PM

## 2018-05-14 NOTE — PROGRESS NOTES
Hospitalist Progress Note  Jared De Leon MD  Answering service: [phone] OR [phone] from in house phone      Date of Service:  2018  NAME:  Marlo Lorenzo  :  1985  MRN:  [id number]      Admission Summary:   30 yo woman with ESRD on TTSa HD via RUE AVG (previously on PD), anemia, asthma, chronic pain, lumbar DDD, GERD, HLD, HTN, lupus, and h/o PE on apixaban presented to the ED from home on 18 with left lower abdominal pain and missed HD on Sat . She was last admitted at [facility] 3/11/18 - 18 for E coli and Candida peritonitis and sepsis. She had her drain exchanged and upsized by IR on . She completed IV abx on 18. Multiple abdominal CTs revealed interval changes but she still required washout due to loculations and abscess. She was admitted to [facility] with abdominal cavity abscess.     Interval history / Subjective:   C/o pain at right chest tube site; passing gas but no BM since ; d/w nurse     Assessment & Plan:     Abdominal cavity abscess with sepsis (POA) - fever, tachycardia, leucopenia on admission  - recently completed IV abx for E coli and Candida peritonitis  - BCx  NGTD  - empiric vanc, Zosyn started ; de-escalate prn  - s/p CT-guided drainage by IR with drain left in place   - abscess fluid Cx  pending  - General Surgery following  - pain control; increase Dilaudid q3h prn with hold parameters  - last took apixaban on  or 5/10  - stable for transfer to [facility]    Loculated right pleural effusion (POA) - pt c/o IVY  - s/p US-guided drainage by IR with drain left in place   - transudate  - pleural fluid Cx  pending  - consult PCCM to manage drain  - pain control: add lidocaine    ESRD on HD - TTSa HD per Renal via RUE AVG    Lupus - immunosuppressives on hold; still on prednisone    H/o VTE - apixaban on hold for procedures and will continue to hold in case pt needs further procedures; pt reported last taking on/about 5/9 or 5/10  - resume apixaban when able  - RUE and b/l LE venous dopplers negative DVT    RUE and b/l LE edema - likely due to ESRD as dopplers negative for DVT    Anemia of chronic illness - EPO with HD per Renal    GERD - PPI    Code status: full  DVT prophylaxis: SCDs    Care Plan discussed with: Patient/Family and Nurse  Disposition: TBD     Hospital Problems  Date Reviewed: 5/12/2018          Codes Class Noted POA    * (Principal)Abscess of abdominal cavity ([facility] [address] ICD-10-CM: K65.1  ICD-9-CM: 567.22  5/12/2018 Yes            Review of Systems:   Pertinent items are noted in HPI. Vital Signs:    Last 24hrs VS reviewed since prior progress note.  Most recent are:  Visit Vitals    /84 (BP 1 Location: Left arm, BP Patient Position: At rest;Sitting)    Pulse 90    Temp 98.7 °F (37.1 °C)    Resp 24    Ht 5' 5\" (1.651 m)    Wt 63.1 kg (139 lb 1.8 oz)    SpO2 99%    BMI 23.15 kg/m2       Intake/Output Summary (Last 24 hours) at 05/14/18 1250  Last data filed at 05/14/18 0442   Gross per 24 hour   Intake              687 ml   Output             4420 ml   Net            -3733 ml        Physical Examination:     Constitutional:  awake, no acute distress, cooperative, pleasant    ENT:  oral mucosa moist, oropharynx benign    Resp:  diminished BS right base, right chest wall drain   CV:  regular rhythm, normal rate, no m/r/g appreciated, RUE and b/l LE edema, RUE AVG +thrill/bruit    GI:  +BS, soft, non distended, diffusely tender, + abdominal drain     Musculoskeletal:  moves all extremities    Neurologic:  AAOx3, NFD     Skin:  warm, dry; multiple healed abdominal surgical scars    Data Review:    Review and/or order of clinical lab test  Review and/or order of tests in the radiology section of CPT  Review and/or order of tests in the medicine section of CPT    Labs:     Recent Labs      05/14/18   0510  05/13/18   0647   WBC  2.7*  2.8*   HGB  8.5*  8.5*   HCT 29.0*  29.2*   PLT  240  270     Recent Labs      05/14/18   0510  05/13/18   0647  05/12/18   1131   NA  140  137  137  138   K  3.8  4.9  4.9  4.6   CL  104  102  100  99   CO2  27  26  31  31   BUN  17  34*  35*  30*   CREA  4.36*  6.60*  6.57*  5.93*   GLU  98  82  90  79   CA  8.0*  8.3*  8.2*  8.4*   MG  1.8   --    --    PHOS  3.4  5.4*   --      Recent Labs      05/14/18   0510  05/13/18   0647  05/12/18   1131   SGOT   --    --   28   ALT   --    --   11*   AP   --    --   116   TBILI   --    --   0.3   TP   --   6.8  6.8   ALB  1.7*  1.8*  2.0*   GLOB   --    --   4.8*   LPSE   --    --   229     Recent Labs      05/13/18   0951   INR  1.1   PTP  11.6*   APTT  30.7      No results for input(s): FE, TIBC, PSAT, FERR in the last 72 hours. Lab Results   Component Value Date/Time    Folate 4.1 (L) 03/15/2018 10:32 AM      No results for input(s): PH, PCO2, PO2 in the last 72 hours. No results for input(s): CPK, CKNDX, TROIQ in the last 72 hours.     No lab exists for component: CPKMB  Lab Results   Component Value Date/Time    Cholesterol, total 117 09/25/2015 02:02 PM    HDL Cholesterol 31 (L) 09/25/2015 02:02 PM    LDL, calculated 57 09/25/2015 02:02 PM    Triglyceride 146 09/25/2015 02:02 PM    CHOL/HDL Ratio 7.7 (H) 05/31/2009 10:30 AM     Lab Results   Component Value Date/Time    Glucose (POC) 123 (H) 04/20/2018 05:33 PM    Glucose (POC) 95 04/20/2018 11:19 AM    Glucose (POC) 100 04/20/2018 07:18 AM    Glucose (POC) 119 (H) 04/19/2018 09:11 PM    Glucose (POC) 86 04/19/2018 04:30 PM     Lab Results   Component Value Date/Time    Color YELLOW/STRAW 08/12/2016 09:06 PM    Appearance CLEAR 08/12/2016 09:06 PM    Specific gravity 1.017 08/12/2016 09:06 PM    Specific gravity 1.010 07/11/2016 12:44 PM    pH (UA) 7.0 08/12/2016 09:06 PM    Protein 300 (A) 08/12/2016 09:06 PM    Glucose NEGATIVE  08/12/2016 09:06 PM    Ketone TRACE (A) 08/12/2016 09:06 PM    Bilirubin NEGATIVE  07/11/2016 12:44 PM Urobilinogen 1.0 08/12/2016 09:06 PM    Nitrites NEGATIVE  08/12/2016 09:06 PM    Leukocyte Esterase NEGATIVE  08/12/2016 09:06 PM    Epithelial cells MODERATE (A) 08/12/2016 09:06 PM    Bacteria 1+ (A) 08/12/2016 09:06 PM    WBC 0-4 08/12/2016 09:06 PM    RBC 0-5 08/12/2016 09:06 PM     Medications Reviewed:     Current Facility-Administered Medications   Medication Dose Route Frequency    fentaNYL citrate (PF) injection 50 mcg  50 mcg IntraVENous Q3H PRN    epoetin pia (EPOGEN;PROCRIT) injection 10,000 Units  10,000 Units SubCUTAneous Q MON, WED & FRI    allopurinol (ZYLOPRIM) tablet 100 mg  100 mg Oral DAILY AFTER BREAKFAST    amitriptyline (ELAVIL) tablet 25 mg  25 mg Oral QHS PRN    carvedilol (COREG) tablet 12.5 mg  12.5 mg Oral BID WITH MEALS    gemfibrozil (LOPID) tablet 300 mg  300 mg Oral DAILY    pantoprazole (PROTONIX) tablet 40 mg  40 mg Oral ACB    polyethylene glycol (MIRALAX) packet 17 g  17 g Oral DAILY    predniSONE (DELTASONE) tablet 5 mg  5 mg Oral DAILY    senna (SENOKOT) tablet 8.6 mg  1 Tab Oral DAILY PRN    acetaminophen (TYLENOL) tablet 650 mg  650 mg Oral Q4H PRN    HYDROcodone-acetaminophen (NORCO)  mg tablet 1 Tab  1 Tab Oral Q4H PRN    ondansetron (ZOFRAN) injection 4 mg  4 mg IntraVENous Q4H PRN    bisacodyl (DULCOLAX) tablet 5 mg  5 mg Oral DAILY PRN    bisacodyl (DULCOLAX) suppository 10 mg  10 mg Rectal DAILY PRN    albuterol (PROVENTIL VENTOLIN) nebulizer solution 2.5 mg  2.5 mg Nebulization Q4H PRN    arformoterol (BROVANA) neb solution 15 mcg  15 mcg Nebulization BID RT    And    budesonide (PULMICORT) 500 mcg/2 ml nebulizer suspension  500 mcg Nebulization BID RT    amLODIPine (NORVASC) tablet 10 mg  10 mg Oral DAILY    piperacillin-tazobactam (ZOSYN) 3.375 g in 0.9% sodium chloride (MBP/ADV) 100 mL  3.375 g IntraVENous Q12H    hydrALAZINE (APRESOLINE) 20 mg/mL injection 10 mg  10 mg IntraVENous Q4H PRN    vancomycin dosing per pharmacy   Other Rx Dosing/Monitoring     ______________________________________________________________________  EXPECTED LENGTH OF STAY: 4d 21h  ACTUAL LENGTH OF STAY:          2                 Katty Farah MD

## 2018-05-15 ENCOUNTER — APPOINTMENT (OUTPATIENT)
Dept: GENERAL RADIOLOGY | Age: 33
DRG: 871 | End: 2018-05-15
Attending: INTERNAL MEDICINE
Payer: MEDICARE

## 2018-05-15 LAB
ALBUMIN SERPL-MCNC: 1.6 G/DL (ref 3.5–5)
ALBUMIN/GLOB SERPL: 0.3 {RATIO} (ref 1.1–2.2)
ALP SERPL-CCNC: 84 U/L (ref 45–117)
ALT SERPL-CCNC: 8 U/L (ref 12–78)
ANION GAP SERPL CALC-SCNC: 9 MMOL/L (ref 5–15)
AST SERPL-CCNC: 17 U/L (ref 15–37)
BILIRUB SERPL-MCNC: 0.2 MG/DL (ref 0.2–1)
BUN SERPL-MCNC: 24 MG/DL (ref 6–20)
BUN/CREAT SERPL: 4 (ref 12–20)
CALCIUM SERPL-MCNC: 8.3 MG/DL (ref 8.5–10.1)
CHLORIDE SERPL-SCNC: 102 MMOL/L (ref 97–108)
CO2 SERPL-SCNC: 28 MMOL/L (ref 21–32)
CREAT SERPL-MCNC: 5.66 MG/DL (ref 0.55–1.02)
ERYTHROCYTE [DISTWIDTH] IN BLOOD BY AUTOMATED COUNT: 14.6 % (ref 11.5–14.5)
GLOBULIN SER CALC-MCNC: 4.7 G/DL (ref 2–4)
GLUCOSE SERPL-MCNC: 89 MG/DL (ref 65–100)
HCT VFR BLD AUTO: 29.7 % (ref 35–47)
HGB BLD-MCNC: 8.8 G/DL (ref 11.5–16)
MAGNESIUM SERPL-MCNC: 2.1 MG/DL (ref 1.6–2.4)
MCH RBC QN AUTO: 26.9 PG (ref 26–34)
MCHC RBC AUTO-ENTMCNC: 29.6 G/DL (ref 30–36.5)
MCV RBC AUTO: 90.8 FL (ref 80–99)
NRBC # BLD: 0 K/UL (ref 0–0.01)
NRBC BLD-RTO: 0 PER 100 WBC
PHOSPHATE SERPL-MCNC: 4.1 MG/DL (ref 2.6–4.7)
PLATELET # BLD AUTO: 258 K/UL (ref 150–400)
PMV BLD AUTO: 10.3 FL (ref 8.9–12.9)
POTASSIUM SERPL-SCNC: 4.3 MMOL/L (ref 3.5–5.1)
PROT SERPL-MCNC: 6.3 G/DL (ref 6.4–8.2)
RBC # BLD AUTO: 3.27 M/UL (ref 3.8–5.2)
SODIUM SERPL-SCNC: 139 MMOL/L (ref 136–145)
WBC # BLD AUTO: 2.2 K/UL (ref 3.6–11)

## 2018-05-15 PROCEDURE — 88305 TISSUE EXAM BY PATHOLOGIST: CPT | Performed by: EMERGENCY MEDICINE

## 2018-05-15 PROCEDURE — 74011250637 HC RX REV CODE- 250/637: Performed by: INTERNAL MEDICINE

## 2018-05-15 PROCEDURE — 74011250636 HC RX REV CODE- 250/636: Performed by: INTERNAL MEDICINE

## 2018-05-15 PROCEDURE — 80053 COMPREHEN METABOLIC PANEL: CPT | Performed by: INTERNAL MEDICINE

## 2018-05-15 PROCEDURE — 77010033678 HC OXYGEN DAILY

## 2018-05-15 PROCEDURE — 94640 AIRWAY INHALATION TREATMENT: CPT

## 2018-05-15 PROCEDURE — 3331090002 HH PPS REVENUE DEBIT

## 2018-05-15 PROCEDURE — 84100 ASSAY OF PHOSPHORUS: CPT | Performed by: INTERNAL MEDICINE

## 2018-05-15 PROCEDURE — 88112 CYTOPATH CELL ENHANCE TECH: CPT | Performed by: EMERGENCY MEDICINE

## 2018-05-15 PROCEDURE — 74011636637 HC RX REV CODE- 636/637: Performed by: INTERNAL MEDICINE

## 2018-05-15 PROCEDURE — 65660000000 HC RM CCU STEPDOWN

## 2018-05-15 PROCEDURE — 36415 COLL VENOUS BLD VENIPUNCTURE: CPT | Performed by: INTERNAL MEDICINE

## 2018-05-15 PROCEDURE — 83735 ASSAY OF MAGNESIUM: CPT | Performed by: INTERNAL MEDICINE

## 2018-05-15 PROCEDURE — 94664 DEMO&/EVAL PT USE INHALER: CPT

## 2018-05-15 PROCEDURE — 3331090001 HH PPS REVENUE CREDIT

## 2018-05-15 PROCEDURE — 85027 COMPLETE CBC AUTOMATED: CPT | Performed by: INTERNAL MEDICINE

## 2018-05-15 PROCEDURE — 74011000250 HC RX REV CODE- 250: Performed by: INTERNAL MEDICINE

## 2018-05-15 PROCEDURE — 71045 X-RAY EXAM CHEST 1 VIEW: CPT

## 2018-05-15 PROCEDURE — 74011000258 HC RX REV CODE- 258: Performed by: INTERNAL MEDICINE

## 2018-05-15 RX ADMIN — FENTANYL CITRATE 50 MCG: 50 INJECTION, SOLUTION INTRAMUSCULAR; INTRAVENOUS at 16:36

## 2018-05-15 RX ADMIN — GEMFIBROZIL 300 MG: 600 TABLET ORAL at 08:41

## 2018-05-15 RX ADMIN — FENTANYL CITRATE 50 MCG: 50 INJECTION, SOLUTION INTRAMUSCULAR; INTRAVENOUS at 08:41

## 2018-05-15 RX ADMIN — CARVEDILOL 12.5 MG: 12.5 TABLET, FILM COATED ORAL at 08:41

## 2018-05-15 RX ADMIN — FENTANYL CITRATE 50 MCG: 50 INJECTION, SOLUTION INTRAMUSCULAR; INTRAVENOUS at 05:07

## 2018-05-15 RX ADMIN — BUDESONIDE 500 MCG: 0.5 INHALANT RESPIRATORY (INHALATION) at 21:40

## 2018-05-15 RX ADMIN — PIPERACILLIN SODIUM,TAZOBACTAM SODIUM 3.38 G: 3; .375 INJECTION, POWDER, FOR SOLUTION INTRAVENOUS at 03:12

## 2018-05-15 RX ADMIN — PIPERACILLIN SODIUM,TAZOBACTAM SODIUM 3.38 G: 3; .375 INJECTION, POWDER, FOR SOLUTION INTRAVENOUS at 14:33

## 2018-05-15 RX ADMIN — ARFORMOTEROL TARTRATE 15 MCG: 15 SOLUTION RESPIRATORY (INHALATION) at 08:04

## 2018-05-15 RX ADMIN — FENTANYL CITRATE 50 MCG: 50 INJECTION, SOLUTION INTRAMUSCULAR; INTRAVENOUS at 12:46

## 2018-05-15 RX ADMIN — HYDROCODONE BITARTRATE AND ACETAMINOPHEN 1 TABLET: 10; 325 TABLET ORAL at 00:28

## 2018-05-15 RX ADMIN — EPOETIN ALFA 10000 UNITS: 10000 SOLUTION INTRAVENOUS; SUBCUTANEOUS at 23:32

## 2018-05-15 RX ADMIN — ALLOPURINOL 100 MG: 100 TABLET ORAL at 08:41

## 2018-05-15 RX ADMIN — BUDESONIDE 500 MCG: 0.5 INHALANT RESPIRATORY (INHALATION) at 08:04

## 2018-05-15 RX ADMIN — PREDNISONE 5 MG: 5 TABLET ORAL at 08:41

## 2018-05-15 RX ADMIN — FENTANYL CITRATE 50 MCG: 50 INJECTION, SOLUTION INTRAMUSCULAR; INTRAVENOUS at 01:35

## 2018-05-15 RX ADMIN — POLYETHYLENE GLYCOL 3350 17 G: 17 POWDER, FOR SOLUTION ORAL at 08:40

## 2018-05-15 RX ADMIN — PANTOPRAZOLE SODIUM 40 MG: 40 TABLET, DELAYED RELEASE ORAL at 06:31

## 2018-05-15 RX ADMIN — HYDROCODONE BITARTRATE AND ACETAMINOPHEN 1 TABLET: 10; 325 TABLET ORAL at 06:31

## 2018-05-15 RX ADMIN — ARFORMOTEROL TARTRATE 15 MCG: 15 SOLUTION RESPIRATORY (INHALATION) at 21:40

## 2018-05-15 RX ADMIN — FENTANYL CITRATE 50 MCG: 50 INJECTION, SOLUTION INTRAMUSCULAR; INTRAVENOUS at 21:33

## 2018-05-15 RX ADMIN — AMLODIPINE BESYLATE 10 MG: 5 TABLET ORAL at 08:41

## 2018-05-15 NOTE — CONSULTS
Pulmonary, Critical Care, and Sleep Medicine~Consult Note    Name: Angella Jimenez MRN: [id number]   : 1985 Hospital: . [facility]    Date: 5/15/2018 10:49 AM Admission: 2018     Impression Plan   1. Pleural effusion, transudate. Suspected to be volume overload + abdominal process   2. E coli peritonitis  3. hx of VTE  4. ESRD, on HD; from Lupus  1. Follow pleural cultures  2. If output is less than 150ml in 24 hours the chest tube can be pulled   3. O2 titration above 90%  4. OOB as tolerated  5. Discussed with hospitalist, attending and RN        Daily Progression:    Consult Note requested by Dr Paul Rodriguez. Patient is back in for hospitalization secondary to abdominal abscess and peritonitis with bibasilar opacifications. Had thoracentesis and pigtail placement on . Continues to drain; path and cultures are unremarkable at this point. Has never had this problem before. Chest film today looks better. Chest tube level at 870ml at 0930. I have reviewed the labs and previous days notes. Pertinent items are noted in HPI. Past Medical History:   Diagnosis Date    Anemia     secondary to lupus    Asthma     no inhaler use in past 2 to 3 years    Carditis     Chronic kidney disease     ESRD    Chronic pain     DDD (degenerative disc disease), lumbar     ESRD (end stage renal disease) (HCC)     GERD (gastroesophageal reflux disease)     Heart failure ([address]     Hemodialysis patient ([address] 2017    [address]  Tuesday,  Thursday,  and Saturday. 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     Poor historian 2018    With medications    Thromboembolus ([address] 2013    lungs    Transfusion history     Last Transfusion 2017  at [facility]      Past Surgical History:   Procedure Laterality Date    HX  SECTION  11/2006    x1    HX OTHER SURGICAL  9/16/15    INSERTION PD CATH; Removed 2017    HX VASCULAR ACCESS Right 2017    Double-Lumen henry catheter upper chest      Prior to Admission medications    Medication Sig Start Date End Date Taking? Authorizing Provider   senna (SENNA) 8.6 mg tablet Take 1 Tab by mouth daily as needed for Constipation. for constipation   Yes Historical Provider   amitriptyline (ELAVIL) 25 mg tablet Take 25 mg by mouth nightly as needed for Sleep. Yes Historical Provider   amLODIPine (NORVASC) 5 mg tablet Take 1 Tab by mouth daily. Patient taking differently: Take 10 mg by mouth daily. 18  Yes Jessika Rangel MD   polyethylene glycol (MIRALAX) 17 gram packet Take 1 Packet by mouth daily. 18  Yes Jessika Rangel MD   predniSONE (DELTASONE) 5 mg tablet Take 5 mg by mouth daily. Yes Historical Provider   fluticasone-vilanterol (BREO ELLIPTA) 200-25 mcg/dose inhaler Take 1 Puff by inhalation daily. Rinse mouth out after use 17  Yes Afua Armendariz NP   gemfibrozil (LOPID) 600 mg tablet Take 300 mg by mouth daily. 11/3/17  Yes Historical Provider   carvedilol (COREG) 6.25 mg tablet Take 12.5 mg by mouth two (2) times daily (with meals). Yes Historical Provider   azaTHIOprine (IMURAN) 50 mg tablet Take 50 mg by mouth daily (after breakfast). 11/3/17  Yes Historical Provider   albuterol (PROVENTIL HFA, VENTOLIN HFA, PROAIR HFA) 90 mcg/actuation inhaler Take 1-2 Puffs by inhalation every four (4) hours as needed for Wheezing or Shortness of Breath. 10/30/17  Yes Simon Stallworth NP   hydroxychloroquine (PLAQUENIL) 200 mg tablet Take 200 mg by mouth two (2) times a day.    Yes Darnell Franks MD   allopurinol (ZYLOPRIM) 100 mg tablet Take 100 mg by mouth daily (after breakfast). Needs to TAKE on a full stomach; will cause her to be nauseated. 10/15/15  Yes Historical Provider   cyanocobalamin (VITAMIN B-12) 2,500 mcg sublingual tablet Take 1 Tab by mouth daily. 10/27/15  Yes Jeremi Vieira MD   pantoprazole (PROTONIX) 40 mg tablet Take 1 Tab by mouth Daily (before breakfast). 10/23/15  Yes Danisha Wyman MD   apixaban (ELIQUIS) 5 mg tablet Take 1 Tab by mouth every twelve (12) hours. 18   Jennifer Beavers MD     Allergies   Allergen Reactions    Compazine [Prochlorperazine Edisylate] Nausea and Vomiting and Palpitations      Social History   Substance Use Topics    Smoking status: Never Smoker    Smokeless tobacco: Never Used    Alcohol use No      Family History   Problem Relation Age of Onset    Diabetes Father     Hypertension Father     Cancer Other      aunt with breast cancer    Diabetes Mother        OBJECTIVE:     Vital Signs:       Visit Vitals    BP (!) 139/97    Pulse 93    Temp 98.5 °F (36.9 °C)    Resp 21    Ht 5' 5\" (1.651 m)    Wt 63.8 kg (140 lb 10.5 oz)    SpO2 95%    BMI 23.41 kg/m2      Temp (24hrs), Av.4 °F (36.9 °C), Min:97.4 °F (36.3 °C), Max:98.8 °F (37.1 °C)     Intake/Output:     Last shift:      Last 3 shifts:  1901 - 05/15 0700  In: 8855 [P.O.:1200;  I.V.:300]  Out: 4265 [Urine:400; Drains:365]        Intake/Output Summary (Last 24 hours) at 05/15/18 1049  Last data filed at 05/15/18 0334   Gross per 24 hour   Intake             1400 ml   Output              505 ml   Net              895 ml       Physical Exam:                                        Exam Findings Other   General: No resp distress noted, appears stated age    HEENT:  No ulcers, JVD not elevated, no cervical LAD    Chest: No pectus deformity, normal chest rise b/l    HEART:  RRR, no murmurs/rubs/gallops    Lungs:  CTA b/l, no rhonchi/crackles/wheeze, diminished BS at bases    ABD: non rigid mildly distended    EXT: No cyanosis/clubbing/edema, normal peripheral pulses    Skin: No rashes or ulcers, no mottling    Neuro: A/O x 3        Medications:  Current Facility-Administered Medications   Medication Dose Route Frequency    fentaNYL citrate (PF) injection 50 mcg  50 mcg IntraVENous Q3H PRN    lidocaine (LIDODERM) 5 % patch 1 Patch  1 Patch TransDERmal Q24H    vancomycin (VANCOCIN) 500 mg in 0.9% sodium chloride (MBP/ADV) 100 mL  500 mg IntraVENous Q TU, TH & SAT    epoetin pia (EPOGEN;PROCRIT) injection 10,000 Units  10,000 Units SubCUTAneous Q TUE, THU & SAT    allopurinol (ZYLOPRIM) tablet 100 mg  100 mg Oral DAILY AFTER BREAKFAST    amitriptyline (ELAVIL) tablet 25 mg  25 mg Oral QHS PRN    carvedilol (COREG) tablet 12.5 mg  12.5 mg Oral BID WITH MEALS    gemfibrozil (LOPID) tablet 300 mg  300 mg Oral DAILY    pantoprazole (PROTONIX) tablet 40 mg  40 mg Oral ACB    polyethylene glycol (MIRALAX) packet 17 g  17 g Oral DAILY    predniSONE (DELTASONE) tablet 5 mg  5 mg Oral DAILY    senna (SENOKOT) tablet 8.6 mg  1 Tab Oral DAILY PRN    acetaminophen (TYLENOL) tablet 650 mg  650 mg Oral Q4H PRN    HYDROcodone-acetaminophen (NORCO)  mg tablet 1 Tab  1 Tab Oral Q4H PRN    ondansetron (ZOFRAN) injection 4 mg  4 mg IntraVENous Q4H PRN    bisacodyl (DULCOLAX) tablet 5 mg  5 mg Oral DAILY PRN    bisacodyl (DULCOLAX) suppository 10 mg  10 mg Rectal DAILY PRN    albuterol (PROVENTIL VENTOLIN) nebulizer solution 2.5 mg  2.5 mg Nebulization Q4H PRN    arformoterol (BROVANA) neb solution 15 mcg  15 mcg Nebulization BID RT    And    budesonide (PULMICORT) 500 mcg/2 ml nebulizer suspension  500 mcg Nebulization BID RT    amLODIPine (NORVASC) tablet 10 mg  10 mg Oral DAILY    piperacillin-tazobactam (ZOSYN) 3.375 g in 0.9% sodium chloride (MBP/ADV) 100 mL  3.375 g IntraVENous Q12H    hydrALAZINE (APRESOLINE) 20 mg/mL injection 10 mg  10 mg IntraVENous Q4H PRN    vancomycin dosing per pharmacy   Other Rx Dosing/Monitoring       Labs:  ABG No results for input(s): PHI, PCO2I, PO2I, HCO3I, SO2I, FIO2I in the last 72 hours.      CBC Recent Labs      05/15/18   0346  05/14/18   0510  05/13/18   0647   WBC  2.2*  2.7*  2.8*   HGB  8.8*  8.5*  8.5*   HCT  29.7*  29.0*  29.2*   PLT  258  240  270   MCV  90.8  90.6  92.7   MCH  26.9  26.6  11.8        Metabolic  Panel Recent Labs      05/15/18   0346  05/14/18   0510  05/13/18   0951  05/13/18   0647  05/12/18   1131   NA  139  140   --   137  137  138   K  4.3  3.8   --   4.9  4.9  4.6   CL  102  104   --   102  100  99   CO2  28  27   --   26  31  31   GLU  89  98   --   82  90  79   BUN  24*  17   --   34*  35*  30*   CREA  5.66*  4.36*   --   6.60*  6.57*  5.93*   CA  8.3*  8.0*   --   8.3*  8.2*  8.4*   MG  2.1  1.8   --    --    --    PHOS  4.1  3.4   --   5.4*   --    ALB  1.6*  1.7*   --   1.8*  2.0*   SGOT  17   --    --    --   28   ALT  8*   --    --    --   11*   INR   --    --   1.1   --    --         Pertinent Labs                SINAN Dukes  5/15/2018

## 2018-05-15 NOTE — PROGRESS NOTES
Patient name: Mamie Aguilar  MRN: [id number]    Nephrology Progress note:    Assessment:  ESRD: on HD [facility]  Recent hx of E. Coli Bacteremia/ E. Coli peritonitis. Increasing intra-abd fluid collection. CT guided Drain placed back in by IR 5/13   Loculated right pleural effusion-> s/p ultrasound guided drainage/pigtail  HTN:stable  Hx of PE: on Eliquis  Lupus: Anemia 2 to ESRD/Lupus: Hgb remains below goal - on EPO  SHPT:   Protein malnutrition  Edema: 3rd spacing       Plan/Recommendations:  HD today  F/u fluid cx-> NGTD  IV Abx  Nepro  Am labs      Subjective:  Still having some abd pain. Abd/pleural drain in place. Afebrile. ROS:   No nausea, no vomiting  No chest pain, no shortness of breath    Exam:  Visit Vitals    BP (!) 142/98 (BP 1 Location: Left arm, BP Patient Position: At rest;Sitting)    Pulse 85    Temp 98.5 °F (36.9 °C)    Resp 24    Ht 5' 5\" (1.651 m)    Wt 63.8 kg (140 lb 10.5 oz)    SpO2 99%    BMI 23.41 kg/m2     Wt Readings from Last 3 Encounters:   05/15/18 63.8 kg (140 lb 10.5 oz)   05/09/18 65.3 kg (144 lb)   05/07/18 65.5 kg (144 lb 8 oz)       Intake/Output Summary (Last 24 hours) at 05/15/18 1401  Last data filed at 05/15/18 0334   Gross per 24 hour   Intake             1400 ml   Output              505 ml   Net              895 ml       Gen: NAD/Cachectic  HEENT: No icterus, dry mm, no oral exudate, AT/NC  Lungs/Chest wall: Clear. Right pleural drain  Cardiovascular: Regular rate, normal rhythm.    Abdomen/: Soft, Abd drain  Ext: Trace RLE peripheral edema  CNS: alert and awake.  Answers appropriately      Current Facility-Administered Medications   Medication Dose Route Frequency Last Dose    fentaNYL citrate (PF) injection 50 mcg  50 mcg IntraVENous Q3H PRN 50 mcg at 05/15/18 1246    lidocaine (LIDODERM) 5 % patch 1 Patch  1 Patch TransDERmal Q24H 1 Patch at 05/14/18 1344    vancomycin (VANCOCIN) 500 mg in 0.9% sodium chloride (MBP/ADV) 100 mL  500 mg IntraVENous Q TU, TH & SAT      epoetin pia (EPOGEN;PROCRIT) injection 10,000 Units  10,000 Units SubCUTAneous Q TUE, THU & SAT      allopurinol (ZYLOPRIM) tablet 100 mg  100 mg Oral DAILY AFTER BREAKFAST 100 mg at 05/15/18 0841    amitriptyline (ELAVIL) tablet 25 mg  25 mg Oral QHS PRN      carvedilol (COREG) tablet 12.5 mg  12.5 mg Oral BID WITH MEALS 12.5 mg at 05/15/18 0841    gemfibrozil (LOPID) tablet 300 mg  300 mg Oral DAILY 300 mg at 05/15/18 0841    pantoprazole (PROTONIX) tablet 40 mg  40 mg Oral ACB 40 mg at 05/15/18 0631    polyethylene glycol (MIRALAX) packet 17 g  17 g Oral DAILY 17 g at 05/15/18 0840    predniSONE (DELTASONE) tablet 5 mg  5 mg Oral DAILY 5 mg at 05/15/18 0841    senna (SENOKOT) tablet 8.6 mg  1 Tab Oral DAILY PRN      acetaminophen (TYLENOL) tablet 650 mg  650 mg Oral Q4H  mg at 05/14/18 2316    HYDROcodone-acetaminophen (NORCO)  mg tablet 1 Tab  1 Tab Oral Q4H PRN 1 Tab at 05/15/18 0631    ondansetron (ZOFRAN) injection 4 mg  4 mg IntraVENous Q4H PRN      bisacodyl (DULCOLAX) tablet 5 mg  5 mg Oral DAILY PRN      bisacodyl (DULCOLAX) suppository 10 mg  10 mg Rectal DAILY PRN      albuterol (PROVENTIL VENTOLIN) nebulizer solution 2.5 mg  2.5 mg Nebulization Q4H PRN      arformoterol (BROVANA) neb solution 15 mcg  15 mcg Nebulization BID RT 15 mcg at 05/15/18 0804    And    budesonide (PULMICORT) 500 mcg/2 ml nebulizer suspension  500 mcg Nebulization BID  mcg at 05/15/18 0804    amLODIPine (NORVASC) tablet 10 mg  10 mg Oral DAILY 10 mg at 05/15/18 0841    piperacillin-tazobactam (ZOSYN) 3.375 g in 0.9% sodium chloride (MBP/ADV) 100 mL  3.375 g IntraVENous Q12H 3.375 g at 05/15/18 0312    hydrALAZINE (APRESOLINE) 20 mg/mL injection 10 mg  10 mg IntraVENous Q4H PRN      vancomycin dosing per pharmacy   Other Rx Dosing/Monitoring         Labs/Data:    Lab Results   Component Value Date/Time    WBC 2.2 (L) 05/15/2018 03:46 AM    Hemoglobin (POC) 7.8 (L) 01/19/2018 12:30 PM    HGB 8.8 (L) 05/15/2018 03:46 AM    Hematocrit (POC) 23 (L) 01/19/2018 12:30 PM    HCT 29.7 (L) 05/15/2018 03:46 AM    PLATELET 742 22/55/5307 03:46 AM    MCV 90.8 05/15/2018 03:46 AM       Lab Results   Component Value Date/Time    Sodium 139 05/15/2018 03:46 AM    Potassium 4.3 05/15/2018 03:46 AM    Chloride 102 05/15/2018 03:46 AM    CO2 28 05/15/2018 03:46 AM    Anion gap 9 05/15/2018 03:46 AM    Glucose 89 05/15/2018 03:46 AM    BUN 24 (H) 05/15/2018 03:46 AM    Creatinine 5.66 (H) 05/15/2018 03:46 AM    BUN/Creatinine ratio 4 (L) 05/15/2018 03:46 AM    GFR est AA 11 (L) 05/15/2018 03:46 AM    GFR est non-AA 9 (L) 05/15/2018 03:46 AM    Calcium 8.3 (L) 05/15/2018 03:46 AM       Patient seen and examined. Chart reviewed. Labs, data and other pertinent notes reviewed in last 24 hrs.     Discussed with patient    Signed by:  Tesfaye Hutson MD

## 2018-05-15 NOTE — PROGRESS NOTES
Bedside and Verbal shift change report given to Felicia (oncoming nurse) by Rashaun Herrera (offgoing nurse). Report included the following information SBAR, Kardex, Intake/Output, MAR, Accordion and Recent Results. Problem: Falls - Risk of  Goal: *Absence of Falls  Document Alex Fall Risk and appropriate interventions in the flowsheet. Outcome: Progressing Towards Goal  Fall Risk Interventions:  Mobility Interventions: Patient to call before getting OOB         Medication Interventions: Assess postural VS orthostatic hypotension, Evaluate medications/consider consulting pharmacy, Patient to call before getting OOB, Teach patient to arise slowly, Utilize gait belt for transfers/ambulation    Elimination Interventions: Call light in reach, Patient to call for help with toileting needs, Toilet paper/wipes in reach, Toileting schedule/hourly rounds, Elevated toilet seat    History of Falls Interventions: Evaluate medications/consider consulting pharmacy        Problem: Pain  Goal: *Control of Pain  Outcome: Progressing Towards Goal  Pt pain control with PRN meds, and using white board to communicate next dose,     Problem: General Infection Care Plan (Adult and Pediatric)  Goal: *Optimize nutritional status  Outcome: Progressing Towards Goal  Pt offered in between meals and bedtime snacks. Intake and output documented    Problem: Nutrition Deficit  Goal: *Optimize nutritional status  Outcome: Progressing Towards Goal  Pt offered in between meals and bedtime snacks.  Intake and output documented

## 2018-05-15 NOTE — PROGRESS NOTES
Reason for Readmission:     Abscess of abdominal cavity. RRAT Score and Risk Level:     20 moderate     Level of Readmission:    first      Care Conference scheduled:   no       Resources/supports as identified by patient/family:   Family members paid to help patient 5 hrs per day through her Medicaid, home health [facility])       Top Challenges facing patient (as identified by patient/family and CM): Finances/Medication cost?     Has Medicare/Caid  Transportation      Self or family  Support system or lack thereof? Family working as Ca[employer] aides, and home health. Patient also gets HD at [address] [place] [address]; was on PD at home at one point. Living arrangements? Lives with parents and her 8year-old daughter. Self-care/ADLs/Cognition? Open to [facility], lives with parents, family members paid by [facility] to help with ADLs. Cognition is good. Current Advanced Directive/Advance Care Plan:  Not on file. Plan for utilizing home health:   Open to [facility] for SN/PT/OT, and family members paid by [facility] for 5 hrs per day to help with ADLs. Likelihood of additional readmission:   High             Transition of Care Plan:    Based on readmission, the patient's previous Plan of Care   has been evaluated and/or modified. The current Transition of Care Plan is:      Return home with resumption of [facility] for SN/PT/OT.

## 2018-05-15 NOTE — PROGRESS NOTES
Hospitalist Progress Note  Ariana Castaneda MD  Answering service: [phone] OR [phone] from in house phone      Date of Service:  5/15/2018  NAME:  Adriano Larios  :  1985  MRN:  [id number]      Admission Summary:   28 yo woman with ESRD on TTSa HD via RUE AVG (previously on PD), anemia, asthma, chronic pain, lumbar DDD, GERD, HLD, HTN, lupus, and h/o PE on apixaban presented to the ED from home on 18 with left lower abdominal pain and missed HD on Sat . She was last admitted at [facility] 3/11/18 - 18 for E coli and Candida peritonitis and sepsis. She had her drain exchanged and upsized by IR on . She completed IV abx on 18. Multiple abdominal CTs revealed interval changes but she still required washout due to loculations and abscess. She was admitted to [facility] with abdominal cavity abscess.     Interval history / Subjective:   C/o pain at right chest tube site  Otherwise in good spirit  CT tube draining bloody fluid, abd drain also have some blood tinge fluid     Assessment & Plan:     Abdominal cavity abscess with sepsis (POA) - fever, tachycardia, leucopenia on admission  - recently completed IV abx for E coli and Candida peritonitis  - BCx  NGTD  - empiric vanc, Zosyn started ; de-escalate prn  - s/p CT-guided drainage by IR with drain left in place   - abscess fluid Cx  pending  - General Surgery following  - pain control; increase Dilaudid q3h prn with hold parameters  - last took apixaban on  or 5/10    Loculated right pleural effusion (POA) - pt c/o IVY  - s/p US-guided drainage by IR with drain left in place   - transudate  - pleural fluid Cx  pending  - consult PCCM to manage drain  - pain control: add lidocaine    ESRD on HD - TTSa HD per Renal via RUE AVG    Lupus - immunosuppressives on hold; still on prednisone    H/o VTE - apixaban on hold for procedures and will continue to hold in case pt needs further procedures; pt reported last taking on/about 5/9 or 5/10  - resume apixaban when able  - RUE and b/l LE venous dopplers negative DVT    RUE and b/l LE edema - likely due to ESRD as dopplers negative for DVT    Anemia of chronic illness - EPO with HD per Renal    GERD - PPI    Code status: full  DVT prophylaxis: SCDs    Care Plan discussed with: Patient/Family and Nurse  Disposition: TBD     Hospital Problems  Date Reviewed: 5/12/2018          Codes Class Noted POA    * (Principal)Abscess of abdominal cavity ([facility] [address] ICD-10-CM: K65.1  ICD-9-CM: 567.22  5/12/2018 Yes            Review of Systems:   Pertinent items are noted in HPI. Vital Signs:    Last 24hrs VS reviewed since prior progress note.  Most recent are:  Visit Vitals    BP (!) 139/97    Pulse 93    Temp 98.5 °F (36.9 °C)    Resp 21    Ht 5' 5\" (1.651 m)    Wt 63.8 kg (140 lb 10.5 oz)    SpO2 95%    BMI 23.41 kg/m2       Intake/Output Summary (Last 24 hours) at 05/15/18 1107  Last data filed at 05/15/18 0334   Gross per 24 hour   Intake             1400 ml   Output              505 ml   Net              895 ml        Physical Examination:     Constitutional:  awake, no acute distress, cooperative, pleasant    ENT:  oral mucosa moist, oropharynx benign    Resp:  diminished BS right base, right chest wall drain   CV:  regular rhythm, normal rate, no m/r/g appreciated, RUE and b/l LE edema, RUE AVG +thrill/bruit    GI:  +BS, soft, non distended, diffusely tender, + abdominal drain     Musculoskeletal:  moves all extremities    Neurologic:  AAOx3, NFD     Skin:  warm, dry; multiple healed abdominal surgical scars    Data Review:    Review and/or order of clinical lab test  Review and/or order of tests in the radiology section of CPT  Review and/or order of tests in the medicine section of CPT    Labs:     Recent Labs      05/15/18   0346  05/14/18   0510   WBC  2.2*  2.7*   HGB  8.8*  8.5*   HCT  29.7*  29.0*   PLT  258  240     Recent Labs      05/15/18   0346  05/14/18   0510  05/13/18   0647   NA  139  140  137  137   K  4.3  3.8  4.9  4.9   CL  102  104  102  100   CO2  28  27  26  31   BUN  24*  17  34*  35*   CREA  5.66*  4.36*  6.60*  6.57*   GLU  89  98  82  90   CA  8.3*  8.0*  8.3*  8.2*   MG  2.1  1.8   --    PHOS  4.1  3.4  5.4*     Recent Labs      05/15/18   0346  05/14/18   0510  05/13/18   0647  05/12/18   1131   SGOT  17   --    --   28   ALT  8*   --    --   11*   AP  84   --    --   116   TBILI  0.2   --    --   0.3   TP  6.3*   --   6.8  6.8   ALB  1.6*  1.7*  1.8*  2.0*   GLOB  4.7*   --    --   4.8*   LPSE   --    --    --   229     Recent Labs      05/13/18   0951   INR  1.1   PTP  11.6*   APTT  30.7      No results for input(s): FE, TIBC, PSAT, FERR in the last 72 hours. Lab Results   Component Value Date/Time    Folate 4.1 (L) 03/15/2018 10:32 AM      No results for input(s): PH, PCO2, PO2 in the last 72 hours. No results for input(s): CPK, CKNDX, TROIQ in the last 72 hours.     No lab exists for component: CPKMB  Lab Results   Component Value Date/Time    Cholesterol, total 117 09/25/2015 02:02 PM    HDL Cholesterol 31 (L) 09/25/2015 02:02 PM    LDL, calculated 57 09/25/2015 02:02 PM    Triglyceride 146 09/25/2015 02:02 PM    CHOL/HDL Ratio 7.7 (H) 05/31/2009 10:30 AM     Lab Results   Component Value Date/Time    Glucose (POC) 123 (H) 04/20/2018 05:33 PM    Glucose (POC) 95 04/20/2018 11:19 AM    Glucose (POC) 100 04/20/2018 07:18 AM    Glucose (POC) 119 (H) 04/19/2018 09:11 PM    Glucose (POC) 86 04/19/2018 04:30 PM     Lab Results   Component Value Date/Time    Color YELLOW/STRAW 08/12/2016 09:06 PM    Appearance CLEAR 08/12/2016 09:06 PM    Specific gravity 1.017 08/12/2016 09:06 PM    Specific gravity 1.010 07/11/2016 12:44 PM    pH (UA) 7.0 08/12/2016 09:06 PM    Protein 300 (A) 08/12/2016 09:06 PM    Glucose NEGATIVE  08/12/2016 09:06 PM    Ketone TRACE (A) 08/12/2016 09:06 PM    Bilirubin NEGATIVE 07/11/2016 12:44 PM    Urobilinogen 1.0 08/12/2016 09:06 PM    Nitrites NEGATIVE  08/12/2016 09:06 PM    Leukocyte Esterase NEGATIVE  08/12/2016 09:06 PM    Epithelial cells MODERATE (A) 08/12/2016 09:06 PM    Bacteria 1+ (A) 08/12/2016 09:06 PM    WBC 0-4 08/12/2016 09:06 PM    RBC 0-5 08/12/2016 09:06 PM     Medications Reviewed:     Current Facility-Administered Medications   Medication Dose Route Frequency    fentaNYL citrate (PF) injection 50 mcg  50 mcg IntraVENous Q3H PRN    lidocaine (LIDODERM) 5 % patch 1 Patch  1 Patch TransDERmal Q24H    vancomycin (VANCOCIN) 500 mg in 0.9% sodium chloride (MBP/ADV) 100 mL  500 mg IntraVENous Q TU, TH & SAT    epoetin pia (EPOGEN;PROCRIT) injection 10,000 Units  10,000 Units SubCUTAneous Q TUE, THU & SAT    allopurinol (ZYLOPRIM) tablet 100 mg  100 mg Oral DAILY AFTER BREAKFAST    amitriptyline (ELAVIL) tablet 25 mg  25 mg Oral QHS PRN    carvedilol (COREG) tablet 12.5 mg  12.5 mg Oral BID WITH MEALS    gemfibrozil (LOPID) tablet 300 mg  300 mg Oral DAILY    pantoprazole (PROTONIX) tablet 40 mg  40 mg Oral ACB    polyethylene glycol (MIRALAX) packet 17 g  17 g Oral DAILY    predniSONE (DELTASONE) tablet 5 mg  5 mg Oral DAILY    senna (SENOKOT) tablet 8.6 mg  1 Tab Oral DAILY PRN    acetaminophen (TYLENOL) tablet 650 mg  650 mg Oral Q4H PRN    HYDROcodone-acetaminophen (NORCO)  mg tablet 1 Tab  1 Tab Oral Q4H PRN    ondansetron (ZOFRAN) injection 4 mg  4 mg IntraVENous Q4H PRN    bisacodyl (DULCOLAX) tablet 5 mg  5 mg Oral DAILY PRN    bisacodyl (DULCOLAX) suppository 10 mg  10 mg Rectal DAILY PRN    albuterol (PROVENTIL VENTOLIN) nebulizer solution 2.5 mg  2.5 mg Nebulization Q4H PRN    arformoterol (BROVANA) neb solution 15 mcg  15 mcg Nebulization BID RT    And    budesonide (PULMICORT) 500 mcg/2 ml nebulizer suspension  500 mcg Nebulization BID RT    amLODIPine (NORVASC) tablet 10 mg  10 mg Oral DAILY    piperacillin-tazobactam (ZOSYN) 3.375 g in 0.9% sodium chloride (MBP/ADV) 100 mL  3.375 g IntraVENous Q12H    hydrALAZINE (APRESOLINE) 20 mg/mL injection 10 mg  10 mg IntraVENous Q4H PRN    vancomycin dosing per pharmacy   Other Rx Dosing/Monitoring     ______________________________________________________________________  EXPECTED LENGTH OF STAY: 4d 21h  ACTUAL LENGTH OF STAY:          3                 May Stephen MD

## 2018-05-15 NOTE — PROGRESS NOTES
Progress Note    Patient: Sky Pate MRN: [id number]  SSN: [id number]    YOB: 1985  Age: 29 y.o. Sex: female      Admit Date: 2018    * No surgery found *    Procedure:      Subjective:     Patient complaining of some soreness mainly at the chest tube insertion site. Objective:     Visit Vitals    BP (!) 139/97    Pulse 93    Temp 98.5 °F (36.9 °C)    Resp 21    Ht 5' 5\" (1.651 m)    Wt 140 lb 10.5 oz (63.8 kg)    SpO2 95%    BMI 23.41 kg/m2       Temp (24hrs), Av.4 °F (36.9 °C), Min:97.4 °F (36.3 °C), Max:98.8 °F (37.1 °C)      Physical Exam:    Gen- Alert in NAD  Lungs- CTA  H-RRR  Abd- soft with very minimal tenderness in the lower abdomen where the drainage was performed. -    Data Review: images and reports reviewed    Lab Review: All lab results for the last 24 hours reviewed. Assessment:     Hospital Problems  Date Reviewed: 2018          Codes Class Noted POA    * (Principal)Abscess of abdominal cavity [facility]) ICD-10-CM: K65.1  ICD-9-CM: 567.22  2018 Yes              Plan/Recommendations/Medical Decision Making:   Much improved in the lower abdomen-Continue drainage catheter for now. Diet as tolerated.      Signed By: Ronald West MD     May 15, 2018

## 2018-05-15 NOTE — PROGRESS NOTES
Spiritual Care Partner Volunteer visited patient in room 425 on 5.15.18. Documented by: : Rev. Nataliya Santana; [facility], to contact [phone] [address] call: [phone]

## 2018-05-15 NOTE — PROGRESS NOTES
Problem: Falls - Risk of  Goal: *Absence of Falls  Document Alex Fall Risk and appropriate interventions in the flowsheet. Outcome: Progressing Towards Goal  Fall Risk Interventions:  Mobility Interventions: Communicate number of staff needed for ambulation/transfer, OT consult for ADLs, Patient to call before getting OOB, PT Consult for mobility concerns, PT Consult for assist device competence         Medication Interventions: Evaluate medications/consider consulting pharmacy, Patient to call before getting OOB, Teach patient to arise slowly    Elimination Interventions: Call light in reach, Patient to call for help with toileting needs, Toileting schedule/hourly rounds    History of Falls Interventions: Evaluate medications/consider consulting pharmacy        Problem: Pain  Goal: *Control of Pain  Outcome: Progressing Towards Goal  Pt has Q3 fentanyl and Q4 norco as needed. Bedside shift change report given to Yomaira Flores (oncoming nurse) by Marcelo Mitchell RN (offgoing nurse). Report included the following information SBAR, Kardex, Intake/Output, MAR, Recent Results and Cardiac Rhythm NSR.

## 2018-05-15 NOTE — CONSULTS
ID consult  NAME:  Mya Benoit                      :   1985                       MRN:   [id number]   Date/Time:  5/15/2018 3:56 PM  Subjective:   REASON FOR CONSULT:  Peritonitis  Pt well known to me has been in hospital a few times in the past 6 months    Admitted with distended abdomen  Mya Benoit  is a 29 y. o.female  with a history of SLE, lupus nephritis leading to ESRD //HTN/ treated for candida peritonitis in Dec 2017  She was then admitted to [facility] between 3/11-18 for peritonitis due to E.coli and had the peritoneal fluid drained many times while in hospital and later underwent washout in OR and loculations were broken  She was sent home with the JENNIFER  drain and followed with surgery as Op and the drain was removed on 18 in their office  She came to the ED  with abdominal pain sob of 2 days on 18   CT done showed Increase in size of lower anterior abdominal collection status post  removal of drainage catheter. Anasarca with right greater than left pleural effusions. Renal atrophy. She did not have any fever in the ED even though she said that she had low grade fever of 100 while on dialysis.   IR put a drain on  - they also drained rt pleural fluid  She has been started on vanco and zosyn  I am asked to see the patient for the same    Multiple hospitalizations in the past few months  OCt 14- for left lower lobe pneumonia/effusion- was found to have PE on the rt side  10/27-10/30 possible pneumonia- same infiltrate left lower lobe- sent on augmentin    - for b/l leg swelling and abdominal swelling    17-17- fungal peritonitis- treated with fluconazole for 4 weeks    3/11-18- E.coli peritonitis    She had  peritoneal dialysis since - had cath placed in  until it was removed in Dec 2017  LMP 2 yrs ago  Was sexually active in 2017  HIV [address] negative   Last travel was to [place] in 2017  Medical history   Pericardial effusion  PE  Anemia  HTN  Hypoalbuminemia  Hypercholesterolemia  Candida parapsilosis peritonitis      Past Surgical History:   Procedure Laterality Date    HX  SECTION  11/2006    x1    HX OTHER SURGICAL  9/16/15    INSERTION PD CATH;  Removed 2017    HX VASCULAR ACCESS Right 2017    Double-Lumen henry catheter upper chest         Family History   Problem Relation Age of Onset    Diabetes Father     Hypertension Father     Cancer Other      aunt with breast cancer    Diabetes Mother      Allergies   Allergen Reactions    Compazine [Prochlorperazine Edisylate] Nausea and Vomiting and Palpitations     SH  Lives with her parents and 6year old daughter  Non smoker  No alcohol  No illicit drug use      Current Facility-Administered Medications   Medication Dose Route Frequency Provider Last Rate Last Dose    fentaNYL citrate (PF) injection 50 mcg  50 mcg IntraVENous Q3H PRN Surinder José MD   50 mcg at 05/15/18 1636    lidocaine (LIDODERM) 5 % patch 1 Patch  1 Patch TransDERmal Q24H Billy Solis MD   1 Patch at 05/15/18 1433    epoetin pia (EPOGEN;PROCRIT) injection 10,000 Units  10,000 Units SubCUTAneous Q TUE, THU & SAT Brunilda Elizondo MD        allopurinol (ZYLOPRIM) tablet 100 mg  100 mg Oral DAILY AFTER Yary Carias MD   100 mg at 05/15/18 0841    amitriptyline (ELAVIL) tablet 25 mg  25 mg Oral QHS PRN Bin Morales MD        carvedilol (COREG) tablet 12.5 mg  12.5 mg Oral BID WITH MEALS Bin Morales MD   Stopped at 05/15/18 1700    gemfibrozil (LOPID) tablet 300 mg  300 mg Oral DAILY Bin Morales MD   300 mg at 05/15/18 0841    pantoprazole (PROTONIX) tablet 40 mg  40 mg Oral ACB Bin Morales MD   40 mg at 05/15/18 0631    polyethylene glycol (MIRALAX) packet 17 g  17 g Oral DAILY Bin Morales MD   17 g at 05/15/18 0840    predniSONE (DELTASONE) tablet 5 mg  5 mg Oral DAILY Bin Morales MD   5 mg at 05/15/18 0841    senna (SENOKOT) tablet 8.6 mg  1 Tab Oral DAILY PRN Marcus Clarke MD        acetaminophen (TYLENOL) tablet 650 mg  650 mg Oral Q4H PRN Marcus Clarke MD   650 mg at 05/14/18 2316    HYDROcodone-acetaminophen (NORCO)  mg tablet 1 Tab  1 Tab Oral Q4H PRN Christina Gentile MD   1 Tab at 05/15/18 0631    ondansetron (ZOFRAN) injection 4 mg  4 mg IntraVENous Q4H PRN Marcus Clarke MD        bisacodyl (DULCOLAX) tablet 5 mg  5 mg Oral DAILY PRN Marcus Clarke MD        bisacodyl (DULCOLAX) suppository 10 mg  10 mg Rectal DAILY PRN Marcus Clarke MD        albuterol (PROVENTIL VENTOLIN) nebulizer solution 2.5 mg  2.5 mg Nebulization Q4H PRN Marcus Clarke MD        arformoterol [facility]) neb solution 15 mcg  15 mcg Nebulization BID RT Marcus Clarke MD   15 mcg at 05/15/18 6199    And    budesonide (PULMICORT) 500 mcg/2 ml nebulizer suspension  500 mcg Nebulization BID RT Marcus Clarke MD   500 mcg at 05/15/18 0804    amLODIPine (NORVASC) tablet 10 mg  10 mg Oral DAILY Marcus Clarke MD   10 mg at 05/15/18 0841    piperacillin-tazobactam (ZOSYN) 3.375 g in 0.9% sodium chloride (MBP/ADV) 100 mL  3.375 g IntraVENous Q12H Marcus Clarke MD 25 mL/hr at 05/15/18 1433 3.375 g at 05/15/18 1433    hydrALAZINE (APRESOLINE) 20 mg/mL injection 10 mg  10 mg IntraVENous Q4H PRN Marcus Clarke MD            REVIEW OF SYSTEMS:     Const: low grade  fever, chills, weight loss of 10 pounds since last admission  Eyes:  negative diplopia or visual changes, negative eye pain  ENT:  negative coryza, negative sore throat  Resp:  No cough, no hemoptysis, dyspnea  Cards: no chest pain  GI abdominal pain  Skin:  negative for rash and pruritus  Heme: negative for easy bruising and gum/nose bleeding  MS: Back pain  Neurolo:negative for headaches, dizziness, vertigo, memory problems       Objective:   VITALS:    Visit Vitals    BP (!) 152/105    Pulse 90    Temp 98.2 °F (36.8 °C) (Oral)    Resp 20    Ht 5' 5\" (1.651 m)    Wt 140 lb 10.5 oz (63.8 kg)    SpO2 99%    BMI 23.41 kg/m2     PHYSICAL EXAM:   General:    Alert, cooperative, no distress, . Head:   Normocephalic, without obvious abnormality, atraumatic. Eyes:   Conjunctivae clear, anicteric sclerae. Pupils are equal  Nose:  Nares normal. No drainage or sinus tenderness.   Throat:    Lips, mucosa, and tongue normal.  No Thrush  Neck:  Supple,   Lungs:   B/l air entry  Decreased bases    Heart:   s1s2  Abdomen:   Firm some tenderness  Drain rt side  Extremities: Rt arm graft  Getting dialysis    Skin:     Dry skin- striae over arms/abdomen  Lymph: Cervical, supraclavicular normal.  Neurologic: Grossly non-focal    Pertinent Labs  Wbc 2.2   Hb 8.8    Albumin 1.6  Peritoneal fluid- not sent for cell count=culture neg  Pleural fluid 800 cells 97% lymph      IMAGING RESULTS:      Impression/Recommendation  33 yr female with SLE/ lupus nephritis/HTN -h/o peritoneal dialysis until Dec 2017 /Candida peritonitis in Dec 2017/ treated with 4 weeks of fluconazole  E.coli peritonitis in [facility] /april 2018    Admitted with abdominal pain and re accumulation of fluid    Peritonitis and peritoneal abscess- Not sure whether this is infection or just fluid re accumulation  culture neg so far- no cell count done  Will get cell count  Will send for [employer] and fungus culture as well  On zosyn and vancomycin  Continue for now-     Chronic leucopenia- has lupus    SLE-was on imuran/plaquenil and prednisone  ESRD due to lupus nephritis on dialysis  HTN-now has hypotension    H/o PE- on elaquis    Discussed with patient and her mom

## 2018-05-15 NOTE — DIALYSIS
[facility] Dialysis Team [facility]  [phone]    Vitals   Pre   Post   Assessment   Pre   Post     Temp  Temp: 98.2 °F (36.8 °C) (05/15/18 1640)  98.3 F LOC  A & O x 3, following commands A & O x 3, following commands   HR   Pulse (Heart Rate): 88 (05/15/18 1640) 91 Lungs   clear  clear   B/P   BP: (!) 129/97 (05/15/18 1640) 1147/97 Cardiac   Heart rate and rhythm regular, pulses palpable   Heart rate and rhythm regular, pulses palpable    Resp   Resp Rate: 22 (05/15/18 1640) 24 Skin   Dry and intact   Dry and intact    Pain level  Pain Intensity 1: 7 (05/15/18 1636) 0 Edema  RLE Trace     RLE Trace    Orders:    Duration:   Start:  1640 Procedure Start Time: 9579 End: 2040 Procedure End Time: 7921 Total:  3:30    Dialyzer:   Dialyzer/Set Up Inspection: Rosalva Raymundo (05/15/18 1640)   K Bath:   Dialysate K (mEq/L): 3 (05/15/18 1640)   Ca Bath:   Dialysate CA (mEq/L): 2.5 (05/15/18 1640)   Na/Bicarb:   Dialysate NA (mEq/L): 140/35 (05/15/18 1640)   Target Fluid Removal:   Goal/Amount of Fluid to Remove (mL): 3000 mL (05/15/18 1640)   Access     Type & Location:   Right AV Graft, cannulated with 15 gauge needles x 2 and flushed with NS with no problem.     Labs     Obtained/Reviewed   Critical Results Called   Date when labs were drawn-  Hgb-    HGB   Date Value Ref Range Status   05/15/2018 8.8 (L) 11.5 - 16.0 g/dL Final     K-    Potassium   Date Value Ref Range Status   05/15/2018 4.3 3.5 - 5.1 mmol/L Final     Ca-   Calcium   Date Value Ref Range Status   05/15/2018 8.3 (L) 8.5 - 10.1 MG/DL Final     Bun-   BUN   Date Value Ref Range Status   05/15/2018 24 (H) 6 - 20 MG/DL Final     Creat-   Creatinine   Date Value Ref Range Status   05/15/2018 5.66 (H) 0.55 - 1.02 MG/DL Final        Medications/ Blood Products Given     Name   Dose   Route and Time     None                Blood Volume Processed (BVP):    48.5 Net Fluid   Removed:  3000 ml   Comments   Time Out Done: 4510  Primary Nurse Rpt Pre: Sai Dyson Alonso Chou, RN  Primary Nurse Rpt Dairo Kerr RN  Pt Education: access care   Care Plan: continue with dialysis per physician's orders. Tx Summary: Pt tolerated treatment well. Cannulated right upper AV Graft with 15 gauge x 2 needles and flushed with NS with no problem. Dialyzer clotted once during treatment and was replaced with a new one and dialysis was restarted. Blood pressure high during treatment but stable. Quality blood flow at 350. All possible blood returned. Post treatment needles removed, pressure held and no excessive bleeding. Admiting Diagnosis:  Pt's previous clinic- [facility]   Consent signed - Informed Consent Verified: Yes (05/15/18 1640)  Crystalita Consent - Yes  Hepatitis Status- neg 18  Machine #- Machine Number: [id number] (05/15/18 1640) Dialyzer [id number], [id number]  Telemetry status- bedside   Pre-dialysis wt. - Pre-Dialysis Weight: 65.7 kg (144 lb 13.5 oz) (05/15/18 1640)

## 2018-05-16 ENCOUNTER — APPOINTMENT (OUTPATIENT)
Dept: GENERAL RADIOLOGY | Age: 33
DRG: 871 | End: 2018-05-16
Attending: PHYSICIAN ASSISTANT
Payer: MEDICARE

## 2018-05-16 ENCOUNTER — HOME CARE VISIT (OUTPATIENT)
Dept: HOME HEALTH SERVICES | Facility: HOME HEALTH | Age: 33
End: 2018-05-16
Payer: MEDICARE

## 2018-05-16 ENCOUNTER — APPOINTMENT (OUTPATIENT)
Dept: CT IMAGING | Age: 33
DRG: 871 | End: 2018-05-16
Attending: SURGERY
Payer: MEDICARE

## 2018-05-16 LAB
ALBUMIN SERPL-MCNC: 1.7 G/DL (ref 3.5–5)
ALBUMIN/GLOB SERPL: 0.4 {RATIO} (ref 1.1–2.2)
ALP SERPL-CCNC: 81 U/L (ref 45–117)
ALT SERPL-CCNC: 7 U/L (ref 12–78)
ANION GAP SERPL CALC-SCNC: 8 MMOL/L (ref 5–15)
APPEARANCE FLD: ABNORMAL
AST SERPL-CCNC: 16 U/L (ref 15–37)
BILIRUB SERPL-MCNC: 0.2 MG/DL (ref 0.2–1)
BUN SERPL-MCNC: 16 MG/DL (ref 6–20)
BUN/CREAT SERPL: 4 (ref 12–20)
CALCIUM SERPL-MCNC: 8.2 MG/DL (ref 8.5–10.1)
CHLORIDE SERPL-SCNC: 100 MMOL/L (ref 97–108)
CO2 SERPL-SCNC: 33 MMOL/L (ref 21–32)
COLOR FLD: ABNORMAL
CREAT SERPL-MCNC: 4.26 MG/DL (ref 0.55–1.02)
ERYTHROCYTE [DISTWIDTH] IN BLOOD BY AUTOMATED COUNT: 14.7 % (ref 11.5–14.5)
GLOBULIN SER CALC-MCNC: 4.5 G/DL (ref 2–4)
GLUCOSE SERPL-MCNC: 96 MG/DL (ref 65–100)
HCT VFR BLD AUTO: 27.9 % (ref 35–47)
HGB BLD-MCNC: 8.2 G/DL (ref 11.5–16)
MAGNESIUM SERPL-MCNC: 1.9 MG/DL (ref 1.6–2.4)
MCH RBC QN AUTO: 26.7 PG (ref 26–34)
MCHC RBC AUTO-ENTMCNC: 29.4 G/DL (ref 30–36.5)
MCV RBC AUTO: 90.9 FL (ref 80–99)
NRBC # BLD: 0 K/UL (ref 0–0.01)
NRBC BLD-RTO: 0 PER 100 WBC
NUC CELL # FLD: 3615 /CU MM
PHOSPHATE SERPL-MCNC: 2.7 MG/DL (ref 2.6–4.7)
PLATELET # BLD AUTO: 272 K/UL (ref 150–400)
PMV BLD AUTO: 9.8 FL (ref 8.9–12.9)
POTASSIUM SERPL-SCNC: 3.8 MMOL/L (ref 3.5–5.1)
PROT SERPL-MCNC: 6.2 G/DL (ref 6.4–8.2)
RBC # BLD AUTO: 3.07 M/UL (ref 3.8–5.2)
RBC # FLD: >100 /CU MM
SODIUM SERPL-SCNC: 141 MMOL/L (ref 136–145)
SPECIMEN SOURCE FLD: ABNORMAL
WBC # BLD AUTO: 2.5 K/UL (ref 3.6–11)

## 2018-05-16 PROCEDURE — 65660000000 HC RM CCU STEPDOWN

## 2018-05-16 PROCEDURE — 80053 COMPREHEN METABOLIC PANEL: CPT | Performed by: INTERNAL MEDICINE

## 2018-05-16 PROCEDURE — 87116 MYCOBACTERIA CULTURE: CPT | Performed by: INTERNAL MEDICINE

## 2018-05-16 PROCEDURE — 74176 CT ABD & PELVIS W/O CONTRAST: CPT

## 2018-05-16 PROCEDURE — 87102 FUNGUS ISOLATION CULTURE: CPT | Performed by: INTERNAL MEDICINE

## 2018-05-16 PROCEDURE — 71045 X-RAY EXAM CHEST 1 VIEW: CPT

## 2018-05-16 PROCEDURE — 85027 COMPLETE CBC AUTOMATED: CPT | Performed by: INTERNAL MEDICINE

## 2018-05-16 PROCEDURE — 3331090002 HH PPS REVENUE DEBIT

## 2018-05-16 PROCEDURE — 94640 AIRWAY INHALATION TREATMENT: CPT

## 2018-05-16 PROCEDURE — 74011250637 HC RX REV CODE- 250/637: Performed by: INTERNAL MEDICINE

## 2018-05-16 PROCEDURE — 3331090001 HH PPS REVENUE CREDIT

## 2018-05-16 PROCEDURE — 74011636637 HC RX REV CODE- 636/637: Performed by: INTERNAL MEDICINE

## 2018-05-16 PROCEDURE — 89050 BODY FLUID CELL COUNT: CPT | Performed by: INTERNAL MEDICINE

## 2018-05-16 PROCEDURE — 36415 COLL VENOUS BLD VENIPUNCTURE: CPT | Performed by: INTERNAL MEDICINE

## 2018-05-16 PROCEDURE — 77030029684 HC NEB SM VOL KT MONA -A

## 2018-05-16 PROCEDURE — 83735 ASSAY OF MAGNESIUM: CPT | Performed by: INTERNAL MEDICINE

## 2018-05-16 PROCEDURE — 74011250636 HC RX REV CODE- 250/636: Performed by: INTERNAL MEDICINE

## 2018-05-16 PROCEDURE — 74011250637 HC RX REV CODE- 250/637: Performed by: HOSPITALIST

## 2018-05-16 PROCEDURE — 74011000258 HC RX REV CODE- 258: Performed by: INTERNAL MEDICINE

## 2018-05-16 PROCEDURE — 84100 ASSAY OF PHOSPHORUS: CPT | Performed by: INTERNAL MEDICINE

## 2018-05-16 PROCEDURE — 74011000250 HC RX REV CODE- 250: Performed by: INTERNAL MEDICINE

## 2018-05-16 RX ADMIN — FENTANYL CITRATE 50 MCG: 50 INJECTION, SOLUTION INTRAMUSCULAR; INTRAVENOUS at 21:07

## 2018-05-16 RX ADMIN — FENTANYL CITRATE 50 MCG: 50 INJECTION, SOLUTION INTRAMUSCULAR; INTRAVENOUS at 09:30

## 2018-05-16 RX ADMIN — ARFORMOTEROL TARTRATE 15 MCG: 15 SOLUTION RESPIRATORY (INHALATION) at 08:07

## 2018-05-16 RX ADMIN — ALLOPURINOL 100 MG: 100 TABLET ORAL at 09:30

## 2018-05-16 RX ADMIN — PIPERACILLIN SODIUM,TAZOBACTAM SODIUM 3.38 G: 3; .375 INJECTION, POWDER, FOR SOLUTION INTRAVENOUS at 15:32

## 2018-05-16 RX ADMIN — HYDROCODONE BITARTRATE AND ACETAMINOPHEN 1 TABLET: 10; 325 TABLET ORAL at 03:19

## 2018-05-16 RX ADMIN — FENTANYL CITRATE 50 MCG: 50 INJECTION, SOLUTION INTRAMUSCULAR; INTRAVENOUS at 12:31

## 2018-05-16 RX ADMIN — BUDESONIDE 500 MCG: 0.5 INHALANT RESPIRATORY (INHALATION) at 08:07

## 2018-05-16 RX ADMIN — APIXABAN 5 MG: 5 TABLET, FILM COATED ORAL at 21:07

## 2018-05-16 RX ADMIN — APIXABAN 5 MG: 5 TABLET, FILM COATED ORAL at 10:45

## 2018-05-16 RX ADMIN — CARVEDILOL 12.5 MG: 12.5 TABLET, FILM COATED ORAL at 16:06

## 2018-05-16 RX ADMIN — GEMFIBROZIL 300 MG: 600 TABLET ORAL at 09:30

## 2018-05-16 RX ADMIN — HYDROCODONE BITARTRATE AND ACETAMINOPHEN 1 TABLET: 10; 325 TABLET ORAL at 09:30

## 2018-05-16 RX ADMIN — PANTOPRAZOLE SODIUM 40 MG: 40 TABLET, DELAYED RELEASE ORAL at 07:30

## 2018-05-16 RX ADMIN — FENTANYL CITRATE 50 MCG: 50 INJECTION, SOLUTION INTRAMUSCULAR; INTRAVENOUS at 06:19

## 2018-05-16 RX ADMIN — CARVEDILOL 12.5 MG: 12.5 TABLET, FILM COATED ORAL at 09:30

## 2018-05-16 RX ADMIN — AMLODIPINE BESYLATE 10 MG: 5 TABLET ORAL at 09:30

## 2018-05-16 RX ADMIN — ARFORMOTEROL TARTRATE 15 MCG: 15 SOLUTION RESPIRATORY (INHALATION) at 19:18

## 2018-05-16 RX ADMIN — PIPERACILLIN SODIUM,TAZOBACTAM SODIUM 3.38 G: 3; .375 INJECTION, POWDER, FOR SOLUTION INTRAVENOUS at 03:04

## 2018-05-16 RX ADMIN — FENTANYL CITRATE 50 MCG: 50 INJECTION, SOLUTION INTRAMUSCULAR; INTRAVENOUS at 01:15

## 2018-05-16 RX ADMIN — FENTANYL CITRATE 50 MCG: 50 INJECTION, SOLUTION INTRAMUSCULAR; INTRAVENOUS at 16:38

## 2018-05-16 RX ADMIN — PREDNISONE 5 MG: 5 TABLET ORAL at 09:30

## 2018-05-16 RX ADMIN — BUDESONIDE 500 MCG: 0.5 INHALANT RESPIRATORY (INHALATION) at 19:19

## 2018-05-16 NOTE — PROGRESS NOTES
General Surgery Daily Progress Note    Admit Date: 2018  Post-Operative Day: * No surgery found * from * No surgery found *     Subjective:     Last 24 hrs: Pt is w/o complaints. Thinks she is getting her CT out today. Tolerating diet. Ctx w/ no organisms thus far - . On zosyn    Objective:     Blood pressure (!) 138/96, pulse 100, temperature 99 °F (37.2 °C), resp. rate 20, height 5' 5\" (1.651 m), weight 138 lb 7.2 oz (62.8 kg), SpO2 100 %. Temp (24hrs), Av.6 °F (37 °C), Min:98.2 °F (36.8 °C), Max:99 °F (37.2 °C)      _____________________  Physical Exam:     Alert and Oriented, x3, in no acute distress. Cardiovascular: RRR, no peripheral edema  Lungs:CTAB   Abdomen: flat, +BS, perc drain in mid-low abd - site intact - bloody drainage in bag      Assessment:   Principal Problem:    Abscess of abdominal cavity (HCC) (2018)            Plan:     Cont perc drain  Cont abx - zosyn  Gi proph   Dialysis per schedule    Data Review:    Recent Labs      18   0354  05/15/18   0346  18   0510   WBC  2.5*  2.2*  2.7*   HGB  8.2*  8.8*  8.5*   HCT  27.9*  29.7*  29.0*   PLT  272  258  240     Recent Labs      18   0354  05/15/18   0346  18   0510   NA  141  139  140   K  3.8  4.3  3.8   CL  100  102  104   CO2  33*  28  27   GLU  96  89  98   BUN  16  24*  17   CREA  4.26*  5.66*  4.36*   CA  8.2*  8.3*  8.0*   MG  1.9  2.1  1.8   PHOS  2.7  4.1  3.4   ALB  1.7*  1.6*  1.7*   SGOT  16  17   --    ALT  7*  8*   --      No results for input(s): AML, LPSE in the last 72 hours.         ______________________  Medications:    Current Facility-Administered Medications   Medication Dose Route Frequency    apixaban (ELIQUIS) tablet 5 mg  5 mg Oral Q12H    fentaNYL citrate (PF) injection 50 mcg  50 mcg IntraVENous Q3H PRN    lidocaine (LIDODERM) 5 % patch 1 Patch  1 Patch TransDERmal Q24H    epoetin pia (EPOGEN;PROCRIT) injection 10,000 Units  10,000 Units SubCUTAneous Q E, THU & SAT    allopurinol (ZYLOPRIM) tablet 100 mg  100 mg Oral DAILY AFTER BREAKFAST    amitriptyline (ELAVIL) tablet 25 mg  25 mg Oral QHS PRN    carvedilol (COREG) tablet 12.5 mg  12.5 mg Oral BID WITH MEALS    gemfibrozil (LOPID) tablet 300 mg  300 mg Oral DAILY    pantoprazole (PROTONIX) tablet 40 mg  40 mg Oral ACB    polyethylene glycol (MIRALAX) packet 17 g  17 g Oral DAILY    predniSONE (DELTASONE) tablet 5 mg  5 mg Oral DAILY    senna (SENOKOT) tablet 8.6 mg  1 Tab Oral DAILY PRN    acetaminophen (TYLENOL) tablet 650 mg  650 mg Oral Q4H PRN    HYDROcodone-acetaminophen (NORCO)  mg tablet 1 Tab  1 Tab Oral Q4H PRN    ondansetron (ZOFRAN) injection 4 mg  4 mg IntraVENous Q4H PRN    bisacodyl (DULCOLAX) tablet 5 mg  5 mg Oral DAILY PRN    bisacodyl (DULCOLAX) suppository 10 mg  10 mg Rectal DAILY PRN    albuterol (PROVENTIL VENTOLIN) nebulizer solution 2.5 mg  2.5 mg Nebulization Q4H PRN    arformoterol (BROVANA) neb solution 15 mcg  15 mcg Nebulization BID RT    And    budesonide (PULMICORT) 500 mcg/2 ml nebulizer suspension  500 mcg Nebulization BID RT    amLODIPine (NORVASC) tablet 10 mg  10 mg Oral DAILY    piperacillin-tazobactam (ZOSYN) 3.375 g in 0.9% sodium chloride (MBP/ADV) 100 mL  3.375 g IntraVENous Q12H    hydrALAZINE (APRESOLINE) 20 mg/mL injection 10 mg  10 mg IntraVENous Q4H PRN       Glenn Ek, NP  5/16/2018      Pt seen and examined  Still some pain but feeling much better  Abd- S/minimal peridrain tenderness  Continue Percutaneous drain. AS there is not much coming out will  Re CT to make sure it is completely drained.

## 2018-05-16 NOTE — PROGRESS NOTES
CM reviewed case with treatment team during Bedside Interdisciplinary Rounds. Patient continues with IV antibiotics. Need for home IV antibiotics is to be determined with administration of IV antibiotics while on outpatient hemodialysis discussed as an option, if needed. CM to continue to follow for discharge planning needs.

## 2018-05-16 NOTE — PROGRESS NOTES
Hospitalist Progress Note  Edwar Bowers MD  Answering service: [phone] or [phone] from in house phone      Date of Service:  2018  NAME:  Avi Meneses  :  1985  MRN:  [id number]      Admission Summary:   30 yo woman with ESRD on TTSa HD via RUE AVG (previously on PD), anemia, asthma, chronic pain, lumbar DDD, GERD, HLD, HTN, lupus, and h/o PE on apixaban presented to the ED from home on 18 with left lower abdominal pain and missed HD on Sat . She was last admitted at [facility] 3/11/18 - 18 for E coli and Candida peritonitis and sepsis. She had her drain exchanged and upsized by IR on . She completed IV abx on 18. Multiple abdominal CTs revealed interval changes but she still required washout due to loculations and abscess. She was admitted to [facility] with abdominal cavity abscess.     Interval history / Subjective:   C/o pain at right chest tube site tube drainage only 30 ml/ last 24 hrs  Otherwise in good spirit HD done      Assessment & Plan:     Abdominal cavity abscess with sepsis (POA) - fever, tachycardia, leucopenia on admission  - recently completed IV abx for E coli and Candida peritonitis  - BCx  NGTD  - empiric vanc, Zosyn started ; de-escalate prn  - s/p CT-guided drainage by IR with drain left in place   - abscess fluid Cx  pending  - General Surgery following  - pain control; increase Dilaudid q3h prn with hold parameters  - last took apixaban on  or 5/10  - ID on board    Loculated right pleural effusion (POA) - pt c/o IVY  - s/p US-guided drainage by IR with drain left in place   - transudate 30 ml over 24 hrs can take out the drain  - pleural fluid Cx  pending  - consult PCCM to manage drain  - pain control: add lidocaine    ESRD on HD - TTS HD per Renal via RUE AVG    Lupus - immunosuppressives on hold; still on prednisone    H/o VTE   - Apixaban resume now- resume apixaban when able  - RUE and b/l LE venous dopplers negative DVT    RUE and b/l LE edema - likely due to ESRD as dopplers negative for DVT    Anemia of chronic illness - EPO with HD per Renal    GERD - PPI    Code status: full  DVT prophylaxis: SCDs    Care Plan discussed with: Patient/Family and Nurse  Disposition: TBD     Hospital Problems  Date Reviewed: 5/12/2018          Codes Class Noted POA    * (Principal)Abscess of abdominal cavity ([facility] [address] ICD-10-CM: K65.1  ICD-9-CM: 567.22  5/12/2018 Yes            Review of Systems:   Pertinent items are noted in HPI. Vital Signs:    Last 24hrs VS reviewed since prior progress note.  Most recent are:  Visit Vitals    BP (!) 138/96    Pulse 100    Temp 99 °F (37.2 °C)    Resp 20    Ht 5' 5\" (1.651 m)    Wt 62.8 kg (138 lb 7.2 oz)    SpO2 100%    BMI 23.04 kg/m2       Intake/Output Summary (Last 24 hours) at 05/16/18 0926  Last data filed at 05/16/18 3393   Gross per 24 hour   Intake              640 ml   Output              130 ml   Net              510 ml        Physical Examination:     Constitutional:  awake, no acute distress, cooperative, pleasant    ENT:  oral mucosa moist, oropharynx benign    Resp:  diminished BS right base, right chest wall drain   CV:  regular rhythm, normal rate, no m/r/g appreciated, RUE and b/l LE edema, RUE AVG +thrill/bruit    GI:  +BS, soft, non distended, diffusely tender, + abdominal drain     Musculoskeletal:  moves all extremities    Neurologic:  AAOx3, NFD     Skin:  warm, dry; multiple healed abdominal surgical scars    Data Review:    Review and/or order of clinical lab test  Review and/or order of tests in the radiology section of CPT  Review and/or order of tests in the medicine section of CPT    Labs:     Recent Labs      05/16/18   0354  05/15/18   0346   WBC  2.5*  2.2*   HGB  8.2*  8.8*   HCT  27.9*  29.7*   PLT  272  258     Recent Labs      05/16/18   0354  05/15/18   0346  05/14/18   0510   NA  141  139  140   K  3.8  4.3 3.8   CL  100  102  104   CO2  33*  28  27   BUN  16  24*  17   CREA  4.26*  5.66*  4.36*   GLU  96  89  98   CA  8.2*  8.3*  8.0*   MG  1.9  2.1  1.8   PHOS  2.7  4.1  3.4     Recent Labs      05/16/18   0354  05/15/18   0346  05/14/18   0510   SGOT  16  17   --    ALT  7*  8*   --    AP  81  84   --    TBILI  0.2  0.2   --    TP  6.2*  6.3*   --    ALB  1.7*  1.6*  1.7*   GLOB  4.5*  4.7*   --      Recent Labs      05/13/18   0951   INR  1.1   PTP  11.6*   APTT  30.7      No results for input(s): FE, TIBC, PSAT, FERR in the last 72 hours. Lab Results   Component Value Date/Time    Folate 4.1 (L) 03/15/2018 10:32 AM      No results for input(s): PH, PCO2, PO2 in the last 72 hours. No results for input(s): CPK, CKNDX, TROIQ in the last 72 hours.     No lab exists for component: CPKMB  Lab Results   Component Value Date/Time    Cholesterol, total 117 09/25/2015 02:02 PM    HDL Cholesterol 31 (L) 09/25/2015 02:02 PM    LDL, calculated 57 09/25/2015 02:02 PM    Triglyceride 146 09/25/2015 02:02 PM    CHOL/HDL Ratio 7.7 (H) 05/31/2009 10:30 AM     Lab Results   Component Value Date/Time    Glucose (POC) 123 (H) 04/20/2018 05:33 PM    Glucose (POC) 95 04/20/2018 11:19 AM    Glucose (POC) 100 04/20/2018 07:18 AM    Glucose (POC) 119 (H) 04/19/2018 09:11 PM    Glucose (POC) 86 04/19/2018 04:30 PM     Lab Results   Component Value Date/Time    Color YELLOW/STRAW 08/12/2016 09:06 PM    Appearance CLEAR 08/12/2016 09:06 PM    Specific gravity 1.017 08/12/2016 09:06 PM    Specific gravity 1.010 07/11/2016 12:44 PM    pH (UA) 7.0 08/12/2016 09:06 PM    Protein 300 (A) 08/12/2016 09:06 PM    Glucose NEGATIVE  08/12/2016 09:06 PM    Ketone TRACE (A) 08/12/2016 09:06 PM    Bilirubin NEGATIVE  07/11/2016 12:44 PM    Urobilinogen 1.0 08/12/2016 09:06 PM    Nitrites NEGATIVE  08/12/2016 09:06 PM    Leukocyte Esterase NEGATIVE  08/12/2016 09:06 PM    Epithelial cells MODERATE (A) 08/12/2016 09:06 PM    Bacteria 1+ (A) 08/12/2016 09:06 PM    WBC 0-4 08/12/2016 09:06 PM    RBC 0-5 08/12/2016 09:06 PM     Medications Reviewed:     Current Facility-Administered Medications   Medication Dose Route Frequency    fentaNYL citrate (PF) injection 50 mcg  50 mcg IntraVENous Q3H PRN    lidocaine (LIDODERM) 5 % patch 1 Patch  1 Patch TransDERmal Q24H    epoetin pia (EPOGEN;PROCRIT) injection 10,000 Units  10,000 Units SubCUTAneous Q TUE, THU & SAT    allopurinol (ZYLOPRIM) tablet 100 mg  100 mg Oral DAILY AFTER BREAKFAST    amitriptyline (ELAVIL) tablet 25 mg  25 mg Oral QHS PRN    carvedilol (COREG) tablet 12.5 mg  12.5 mg Oral BID WITH MEALS    gemfibrozil (LOPID) tablet 300 mg  300 mg Oral DAILY    pantoprazole (PROTONIX) tablet 40 mg  40 mg Oral ACB    polyethylene glycol (MIRALAX) packet 17 g  17 g Oral DAILY    predniSONE (DELTASONE) tablet 5 mg  5 mg Oral DAILY    senna (SENOKOT) tablet 8.6 mg  1 Tab Oral DAILY PRN    acetaminophen (TYLENOL) tablet 650 mg  650 mg Oral Q4H PRN    HYDROcodone-acetaminophen (NORCO)  mg tablet 1 Tab  1 Tab Oral Q4H PRN    ondansetron (ZOFRAN) injection 4 mg  4 mg IntraVENous Q4H PRN    bisacodyl (DULCOLAX) tablet 5 mg  5 mg Oral DAILY PRN    bisacodyl (DULCOLAX) suppository 10 mg  10 mg Rectal DAILY PRN    albuterol (PROVENTIL VENTOLIN) nebulizer solution 2.5 mg  2.5 mg Nebulization Q4H PRN    arformoterol (BROVANA) neb solution 15 mcg  15 mcg Nebulization BID RT    And    budesonide (PULMICORT) 500 mcg/2 ml nebulizer suspension  500 mcg Nebulization BID RT    amLODIPine (NORVASC) tablet 10 mg  10 mg Oral DAILY    piperacillin-tazobactam (ZOSYN) 3.375 g in 0.9% sodium chloride (MBP/ADV) 100 mL  3.375 g IntraVENous Q12H    hydrALAZINE (APRESOLINE) 20 mg/mL injection 10 mg  10 mg IntraVENous Q4H PRN     ______________________________________________________________________  EXPECTED LENGTH OF STAY: 4d 21h  ACTUAL LENGTH OF STAY:          4                 Anastasiya Tim MD

## 2018-05-16 NOTE — PROGRESS NOTES
Pulmonary, Critical Care, and Sleep Medicine~Progress Note    Name: Areli Gao MRN: [id number]   : 1985 Hospital: . [address]   Date: 2018 10:49 AM Admission: 2018     Impression Plan   1. Pleural effusion, transudate. Suspected to be volume overload + abdominal process   2. E coli peritonitis  3. hx of VTE  4. ESRD, on HD; from Lupus  1. Follow pleural cultures  2. If output is less than 150ml in 24 hours the chest tube can be pulled   3. O2 titration above 90%  4. OOB as tolerated  5. Discussed with hospitalist, attending and RN     6. Can pull chest tube today. Discussed with RN      Daily Progression:      Reported less drainage today. Chest film is stable. 5/15  Consult Note requested by Dr Reji Zacarias. Patient is back in for hospitalization secondary to abdominal abscess and peritonitis with bibasilar opacifications. Had thoracentesis and pigtail placement on . Continues to drain; path and cultures are unremarkable at this point. Has never had this problem before. Chest film today looks better. Chest tube level at 870ml at 0930. I have reviewed the labs and previous days notes. Pertinent items are noted in HPI. Past Medical History:   Diagnosis Date    Anemia     secondary to lupus    Asthma     no inhaler use in past 2 to 3 years    Carditis     Chronic kidney disease     ESRD    Chronic pain     DDD (degenerative disc disease), lumbar     ESRD (end stage renal disease) (HCC)     GERD (gastroesophageal reflux disease)     Heart failure ([facility] [address]     Hemodialysis patient ([facility] [address] 2017    [address]  Tuesday,  Thursday,  and Saturday.      Hypercholesterolemia     Hypertension     Intractable nausea and vomiting 10/21/2015    Long term (current) use of anticoagulants     Lupus     Lupus (systemic lupus erythematosus) (HCC)     Malignant hypertension with chronic kidney disease stage V ([address]     Peritoneal dialysis status ([facility] [address] 10/2015    x 2 years Stopped 2017 due to infection and removed.  Poor historian 2018    With medications    Thromboembolus ([facility] [address] 2013    lungs    Transfusion history     Last Transfusion 2017  at [facility]      Past Surgical History:   Procedure Laterality Date    HX  SECTION  11/2006    x1    HX OTHER SURGICAL  9/16/15    INSERTION PD CATH; Removed 2017    HX VASCULAR ACCESS Right 2017    Double-Lumen henry catheter upper chest      Prior to Admission medications    Medication Sig Start Date End Date Taking? Authorizing Provider   senna (SENNA) 8.6 mg tablet Take 1 Tab by mouth daily as needed for Constipation. for constipation   Yes Historical Provider   amitriptyline (ELAVIL) 25 mg tablet Take 25 mg by mouth nightly as needed for Sleep. Yes Historical Provider   amLODIPine (NORVASC) 5 mg tablet Take 1 Tab by mouth daily. Patient taking differently: Take 10 mg by mouth daily. 18  Yes Jessika Rangel MD   polyethylene glycol (MIRALAX) 17 gram packet Take 1 Packet by mouth daily. 18  Yes Jessika Rangel MD   predniSONE (DELTASONE) 5 mg tablet Take 5 mg by mouth daily. Yes Historical Provider   fluticasone-vilanterol (BREO ELLIPTA) 200-25 mcg/dose inhaler Take 1 Puff by inhalation daily. Rinse mouth out after use 17  Yes fAua Armendariz NP   gemfibrozil (LOPID) 600 mg tablet Take 300 mg by mouth daily. 11/3/17  Yes Historical Provider   carvedilol (COREG) 6.25 mg tablet Take 12.5 mg by mouth two (2) times daily (with meals). Yes Historical Provider   azaTHIOprine (IMURAN) 50 mg tablet Take 50 mg by mouth daily (after breakfast). 11/3/17  Yes Historical Provider   albuterol (PROVENTIL HFA, VENTOLIN HFA, PROAIR HFA) 90 mcg/actuation inhaler Take 1-2 Puffs by inhalation every four (4) hours as needed for Wheezing or Shortness of Breath.  10/30/17  Yes Simon Stallworth NP   hydroxychloroquine (PLAQUENIL) 200 mg tablet Take 200 mg by mouth two (2) times a day. Yes Darnell Franks MD   allopurinol (ZYLOPRIM) 100 mg tablet Take 100 mg by mouth daily (after breakfast). Needs to TAKE on a full stomach; will cause her to be nauseated. 10/15/15  Yes Historical Provider   cyanocobalamin (VITAMIN B-12) 2,500 mcg sublingual tablet Take 1 Tab by mouth daily. 10/27/15  Yes Maria Esther Fraga MD   pantoprazole (PROTONIX) 40 mg tablet Take 1 Tab by mouth Daily (before breakfast). 10/23/15  Yes Mayra Olmos MD   apixaban (ELIQUIS) 5 mg tablet Take 1 Tab by mouth every twelve (12) hours. 18   Lorin Vaca MD     Allergies   Allergen Reactions    Compazine [Prochlorperazine Edisylate] Nausea and Vomiting and Palpitations      Social History   Substance Use Topics    Smoking status: Never Smoker    Smokeless tobacco: Never Used    Alcohol use No      Family History   Problem Relation Age of Onset    Diabetes Father     Hypertension Father     Cancer Other      aunt with breast cancer    Diabetes Mother        OBJECTIVE:     Vital Signs:       Visit Vitals    BP (!) 138/96    Pulse 100    Temp 99 °F (37.2 °C)    Resp 20    Ht 5' 5\" (1.651 m)    Wt 62.8 kg (138 lb 7.2 oz)    SpO2 100%    BMI 23.04 kg/m2      Temp (24hrs), Av.6 °F (37 °C), Min:98.2 °F (36.8 °C), Max:99 °F (37.2 °C)     Intake/Output:     Last shift: 701 - 1900  In: -   Out: 30 [Drains:30]    Last 3 shifts: 1901 -  07  In: 1140 [P.O.:1040;  I.V.:100]  Out: 150 [Drains:150]          Intake/Output Summary (Last 24 hours) at 18 1029  Last data filed at 18 0808   Gross per 24 hour   Intake              640 ml   Output              130 ml   Net              510 ml       Physical Exam:                                        Exam Findings Other   General: No resp distress noted, appears stated age    [de-identified]:  No ulcers, JVD not elevated, no cervical LAD    Chest: No pectus deformity, normal chest rise b/l    HEART:  RRR, no murmurs/rubs/gallops    Lungs:  CTA b/l, no rhonchi/crackles/wheeze, diminished BS at bases    ABD: non rigid mildly distended    EXT: No cyanosis/clubbing/edema, normal peripheral pulses    Skin: No rashes or ulcers, no mottling    Neuro: A/O x 3        Medications:  Current Facility-Administered Medications   Medication Dose Route Frequency    apixaban (ELIQUIS) tablet 5 mg  5 mg Oral Q12H    fentaNYL citrate (PF) injection 50 mcg  50 mcg IntraVENous Q3H PRN    lidocaine (LIDODERM) 5 % patch 1 Patch  1 Patch TransDERmal Q24H    epoetin pia (EPOGEN;PROCRIT) injection 10,000 Units  10,000 Units SubCUTAneous Q TUE, THU & SAT    allopurinol (ZYLOPRIM) tablet 100 mg  100 mg Oral DAILY AFTER BREAKFAST    amitriptyline (ELAVIL) tablet 25 mg  25 mg Oral QHS PRN    carvedilol (COREG) tablet 12.5 mg  12.5 mg Oral BID WITH MEALS    gemfibrozil (LOPID) tablet 300 mg  300 mg Oral DAILY    pantoprazole (PROTONIX) tablet 40 mg  40 mg Oral ACB    polyethylene glycol (MIRALAX) packet 17 g  17 g Oral DAILY    predniSONE (DELTASONE) tablet 5 mg  5 mg Oral DAILY    senna (SENOKOT) tablet 8.6 mg  1 Tab Oral DAILY PRN    acetaminophen (TYLENOL) tablet 650 mg  650 mg Oral Q4H PRN    HYDROcodone-acetaminophen (NORCO)  mg tablet 1 Tab  1 Tab Oral Q4H PRN    ondansetron (ZOFRAN) injection 4 mg  4 mg IntraVENous Q4H PRN    bisacodyl (DULCOLAX) tablet 5 mg  5 mg Oral DAILY PRN    bisacodyl (DULCOLAX) suppository 10 mg  10 mg Rectal DAILY PRN    albuterol (PROVENTIL VENTOLIN) nebulizer solution 2.5 mg  2.5 mg Nebulization Q4H PRN    arformoterol (BROVANA) neb solution 15 mcg  15 mcg Nebulization BID RT    And    budesonide (PULMICORT) 500 mcg/2 ml nebulizer suspension  500 mcg Nebulization BID RT    amLODIPine (NORVASC) tablet 10 mg  10 mg Oral DAILY    piperacillin-tazobactam (ZOSYN) 3.375 g in 0.9% sodium chloride (MBP/ADV) 100 mL  3.375 g IntraVENous Q12H    hydrALAZINE (APRESOLINE) 20 mg/mL injection 10 mg 10 mg IntraVENous Q4H PRN       Labs:  ABG No results for input(s): PHI, PCO2I, PO2I, HCO3I, SO2I, FIO2I in the last 72 hours.      CBC Recent Labs      05/16/18   0354  05/15/18   0346  05/14/18   0510   WBC  2.5*  2.2*  2.7*   HGB  8.2*  8.8*  8.5*   HCT  27.9*  29.7*  29.0*   PLT  272  258  240   MCV  90.9  90.8  90.6   MCH  26.7  26.9  63.2        Metabolic  Panel Recent Labs      05/16/18   0354  05/15/18   0346  05/14/18   0510   NA  141  139  140   K  3.8  4.3  3.8   CL  100  102  104   CO2  33*  28  27   GLU  96  89  98   BUN  16  24*  17   CREA  4.26*  5.66*  4.36*   CA  8.2*  8.3*  8.0*   MG  1.9  2.1  1.8   PHOS  2.7  4.1  3.4   ALB  1.7*  1.6*  1.7*   SGOT  16  17   --    ALT  7*  8*   --         Pertinent Labs                SINAN Bui  5/16/2018

## 2018-05-16 NOTE — PROGRESS NOTES
Patient name: Rhett Kim  MRN: [id number]    Nephrology Progress note:    Assessment:  ESRD: on HD [facility]  Recent hx of E. Coli Bacteremia/ E. Coli peritonitis. Increasing intra-abd fluid collection. CT guided Drain placed back in by IR 5/13   Loculated right pleural effusion-> s/p ultrasound guided drainage/pigtail-> d/c 5/16  HTN:stable  Hx of PE: on Eliquis  Lupus: Anemia 2 to ESRD/Lupus: Hgb remains below goal - on EPO  SHPT:   Protein malnutrition: On Nepro  Edema: 3rd spacing       Plan/Recommendations:  HD tomorrow  F/u fluid cx-> NGTD  IV Abx. ID following  Nepro  Am labs      Subjective:  HD last night. 3L drained. Reports eating more. Still having some abd pain but better. Pleural drain removed today. Afebrile. ROS:   No nausea, no vomiting  No chest pain, no shortness of breath    Exam:  Visit Vitals    /89 (BP 1 Location: Left arm, BP Patient Position: At rest)    Pulse 82    Temp 98.5 °F (36.9 °C)    Resp 19    Ht 5' 5\" (1.651 m)    Wt 62.8 kg (138 lb 7.2 oz)    SpO2 98%    BMI 23.04 kg/m2     Wt Readings from Last 3 Encounters:   05/16/18 62.8 kg (138 lb 7.2 oz)   05/09/18 65.3 kg (144 lb)   05/07/18 65.5 kg (144 lb 8 oz)       Intake/Output Summary (Last 24 hours) at 05/16/18 1448  Last data filed at 05/16/18 0808   Gross per 24 hour   Intake              640 ml   Output               30 ml   Net              610 ml       Gen: NAD/Cachectic  HEENT: No icterus, dry mm, no oral exudate, AT/NC  Lungs/Chest wall: Clear.  Right pleural drain  Cardiovascular: Regular rate, normal rhythm. Abdomen/: Soft, Abd drain  Ext: Trace RLE peripheral edema  CNS: alert and awake.  Answers appropriately      Current Facility-Administered Medications   Medication Dose Route Frequency Last Dose    apixaban (ELIQUIS) tablet 5 mg  5 mg Oral Q12H 5 mg at 05/16/18 1045    fentaNYL citrate (PF) injection 50 mcg  50 mcg IntraVENous Q3H PRN 50 mcg at 05/16/18 1231    lidocaine (LIDODERM) 5 % patch 1 Patch  1 Patch TransDERmal Q24H 1 Patch at 05/15/18 1433    epoetin pia (EPOGEN;PROCRIT) injection 10,000 Units  10,000 Units SubCUTAneous Q TUE, THU & SAT 10,000 Units at 05/15/18 2332    allopurinol (ZYLOPRIM) tablet 100 mg  100 mg Oral DAILY AFTER BREAKFAST 100 mg at 05/16/18 0930    amitriptyline (ELAVIL) tablet 25 mg  25 mg Oral QHS PRN      carvedilol (COREG) tablet 12.5 mg  12.5 mg Oral BID WITH MEALS 12.5 mg at 05/16/18 0930    gemfibrozil (LOPID) tablet 300 mg  300 mg Oral DAILY 300 mg at 05/16/18 0930    pantoprazole (PROTONIX) tablet 40 mg  40 mg Oral ACB 40 mg at 05/16/18 0730    polyethylene glycol (MIRALAX) packet 17 g  17 g Oral DAILY 17 g at 05/15/18 0840    predniSONE (DELTASONE) tablet 5 mg  5 mg Oral DAILY 5 mg at 05/16/18 0930    senna (SENOKOT) tablet 8.6 mg  1 Tab Oral DAILY PRN      acetaminophen (TYLENOL) tablet 650 mg  650 mg Oral Q4H  mg at 05/14/18 2316    HYDROcodone-acetaminophen (NORCO)  mg tablet 1 Tab  1 Tab Oral Q4H PRN 1 Tab at 05/16/18 0930    ondansetron (ZOFRAN) injection 4 mg  4 mg IntraVENous Q4H PRN      bisacodyl (DULCOLAX) tablet 5 mg  5 mg Oral DAILY PRN      bisacodyl (DULCOLAX) suppository 10 mg  10 mg Rectal DAILY PRN      albuterol (PROVENTIL VENTOLIN) nebulizer solution 2.5 mg  2.5 mg Nebulization Q4H PRN      arformoterol (BROVANA) neb solution 15 mcg  15 mcg Nebulization BID RT 15 mcg at 05/16/18 0807    And    budesonide (PULMICORT) 500 mcg/2 ml nebulizer suspension  500 mcg Nebulization BID  mcg at 05/16/18 0807    amLODIPine (NORVASC) tablet 10 mg  10 mg Oral DAILY 10 mg at 05/16/18 0930    piperacillin-tazobactam (ZOSYN) 3.375 g in 0.9% sodium chloride (MBP/ADV) 100 mL  3.375 g IntraVENous Q12H 3.375 g at 05/16/18 0304    hydrALAZINE (APRESOLINE) 20 mg/mL injection 10 mg  10 mg IntraVENous Q4H PRN         Labs/Data:    Lab Results   Component Value Date/Time    WBC 2.5 (L) 05/16/2018 03:54 AM    Hemoglobin (POC) 7.8 (L) 01/19/2018 12:30 PM    HGB 8.2 (L) 05/16/2018 03:54 AM    Hematocrit (POC) 23 (L) 01/19/2018 12:30 PM    HCT 27.9 (L) 05/16/2018 03:54 AM    PLATELET 038 29/53/3675 03:54 AM    MCV 90.9 05/16/2018 03:54 AM       Lab Results   Component Value Date/Time    Sodium 141 05/16/2018 03:54 AM    Potassium 3.8 05/16/2018 03:54 AM    Chloride 100 05/16/2018 03:54 AM    CO2 33 (H) 05/16/2018 03:54 AM    Anion gap 8 05/16/2018 03:54 AM    Glucose 96 05/16/2018 03:54 AM    BUN 16 05/16/2018 03:54 AM    Creatinine 4.26 (H) 05/16/2018 03:54 AM    BUN/Creatinine ratio 4 (L) 05/16/2018 03:54 AM    GFR est AA 15 (L) 05/16/2018 03:54 AM    GFR est non-AA 12 (L) 05/16/2018 03:54 AM    Calcium 8.2 (L) 05/16/2018 03:54 AM       Patient seen and examined. Chart reviewed. Labs, data and other pertinent notes reviewed in last 24 hrs.     Discussed with patient    Signed by:  Tayo Couch MD

## 2018-05-16 NOTE — PROGRESS NOTES
Bedside and Verbal shift change report given to [facility] (oncoming nurse) by Og Mchugh (offgoing nurse). Report included the following information SBAR, Kardex, Intake/Output, MAR, Accordion and Recent Results. Problem: Pressure Injury - Risk of  Goal: *Prevention of pressure injury  Document Vikash Scale and appropriate interventions in the flowsheet. Outcome: Progressing Towards Goal  Pressure Injury Interventions: Activity Interventions: Assess need for specialty bed, Increase time out of bed, Pressure redistribution bed/mattress(bed type), PT/OT evaluation    Mobility Interventions: HOB 30 degrees or less, Assess need for specialty bed, Turn and reposition approx.  every two hours(pillow and wedges), Pressure redistribution bed/mattress (bed type), PT/OT evaluation, Float heels    Nutrition Interventions: Document food/fluid/supplement intake, Discuss nutritional consult with provider, Offer support with meals,snacks and hydration    Friction and Shear Interventions: HOB 30 degrees or less, Lift sheet               Problem: Pain  Goal: *Control of Pain  Outcome: Progressing Towards Goal  Pt pain assessed and managed with PRN meds,     Problem: Nutrition Deficit  Goal: *Optimize nutritional status  Outcome: Progressing Towards Goal  Body weight monitored daily, in between meal and bedtime snacks offered and documented

## 2018-05-17 LAB
ALBUMIN SERPL-MCNC: 1.6 G/DL (ref 3.5–5)
ALBUMIN/GLOB SERPL: 0.4 {RATIO} (ref 1.1–2.2)
ALP SERPL-CCNC: 77 U/L (ref 45–117)
ALT SERPL-CCNC: 8 U/L (ref 12–78)
ANION GAP SERPL CALC-SCNC: 9 MMOL/L (ref 5–15)
AST SERPL-CCNC: 17 U/L (ref 15–37)
BACTERIA SPEC CULT: NORMAL
BILIRUB SERPL-MCNC: 0.2 MG/DL (ref 0.2–1)
BUN SERPL-MCNC: 24 MG/DL (ref 6–20)
BUN/CREAT SERPL: 4 (ref 12–20)
CALCIUM SERPL-MCNC: 8.2 MG/DL (ref 8.5–10.1)
CHLORIDE SERPL-SCNC: 101 MMOL/L (ref 97–108)
CO2 SERPL-SCNC: 30 MMOL/L (ref 21–32)
CREAT SERPL-MCNC: 5.72 MG/DL (ref 0.55–1.02)
ERYTHROCYTE [DISTWIDTH] IN BLOOD BY AUTOMATED COUNT: 14.7 % (ref 11.5–14.5)
GLOBULIN SER CALC-MCNC: 4.5 G/DL (ref 2–4)
GLUCOSE SERPL-MCNC: 90 MG/DL (ref 65–100)
GRAM STN SPEC: NORMAL
HCT VFR BLD AUTO: 27.7 % (ref 35–47)
HGB BLD-MCNC: 8.3 G/DL (ref 11.5–16)
MAGNESIUM SERPL-MCNC: 1.7 MG/DL (ref 1.6–2.4)
MCH RBC QN AUTO: 26.6 PG (ref 26–34)
MCHC RBC AUTO-ENTMCNC: 30 G/DL (ref 30–36.5)
MCV RBC AUTO: 88.8 FL (ref 80–99)
NRBC # BLD: 0 K/UL (ref 0–0.01)
NRBC BLD-RTO: 0 PER 100 WBC
PHOSPHATE SERPL-MCNC: 3.5 MG/DL (ref 2.6–4.7)
PLATELET # BLD AUTO: 282 K/UL (ref 150–400)
PMV BLD AUTO: 9.8 FL (ref 8.9–12.9)
POTASSIUM SERPL-SCNC: 4.5 MMOL/L (ref 3.5–5.1)
PROT SERPL-MCNC: 6.1 G/DL (ref 6.4–8.2)
RBC # BLD AUTO: 3.12 M/UL (ref 3.8–5.2)
SERVICE CMNT-IMP: NORMAL
SODIUM SERPL-SCNC: 140 MMOL/L (ref 136–145)
WBC # BLD AUTO: 2.5 K/UL (ref 3.6–11)

## 2018-05-17 PROCEDURE — 74011636637 HC RX REV CODE- 636/637: Performed by: INTERNAL MEDICINE

## 2018-05-17 PROCEDURE — 74011250637 HC RX REV CODE- 250/637: Performed by: HOSPITALIST

## 2018-05-17 PROCEDURE — 74011250637 HC RX REV CODE- 250/637: Performed by: INTERNAL MEDICINE

## 2018-05-17 PROCEDURE — 74011000258 HC RX REV CODE- 258: Performed by: INTERNAL MEDICINE

## 2018-05-17 PROCEDURE — 90935 HEMODIALYSIS ONE EVALUATION: CPT

## 2018-05-17 PROCEDURE — 65270000029 HC RM PRIVATE

## 2018-05-17 PROCEDURE — 36415 COLL VENOUS BLD VENIPUNCTURE: CPT | Performed by: INTERNAL MEDICINE

## 2018-05-17 PROCEDURE — 94640 AIRWAY INHALATION TREATMENT: CPT

## 2018-05-17 PROCEDURE — 74011250636 HC RX REV CODE- 250/636: Performed by: INTERNAL MEDICINE

## 2018-05-17 PROCEDURE — 84100 ASSAY OF PHOSPHORUS: CPT | Performed by: INTERNAL MEDICINE

## 2018-05-17 PROCEDURE — 3331090002 HH PPS REVENUE DEBIT

## 2018-05-17 PROCEDURE — 3331090001 HH PPS REVENUE CREDIT

## 2018-05-17 PROCEDURE — 85027 COMPLETE CBC AUTOMATED: CPT | Performed by: INTERNAL MEDICINE

## 2018-05-17 PROCEDURE — 74011000250 HC RX REV CODE- 250: Performed by: INTERNAL MEDICINE

## 2018-05-17 PROCEDURE — 83735 ASSAY OF MAGNESIUM: CPT | Performed by: INTERNAL MEDICINE

## 2018-05-17 PROCEDURE — 80053 COMPREHEN METABOLIC PANEL: CPT | Performed by: INTERNAL MEDICINE

## 2018-05-17 RX ADMIN — FENTANYL CITRATE 50 MCG: 50 INJECTION, SOLUTION INTRAMUSCULAR; INTRAVENOUS at 16:16

## 2018-05-17 RX ADMIN — FENTANYL CITRATE 50 MCG: 50 INJECTION, SOLUTION INTRAMUSCULAR; INTRAVENOUS at 06:00

## 2018-05-17 RX ADMIN — EPOETIN ALFA 14000 UNITS: 4000 SOLUTION INTRAVENOUS; SUBCUTANEOUS at 20:26

## 2018-05-17 RX ADMIN — HYDROCODONE BITARTRATE AND ACETAMINOPHEN 1 TABLET: 10; 325 TABLET ORAL at 17:59

## 2018-05-17 RX ADMIN — PIPERACILLIN SODIUM,TAZOBACTAM SODIUM 3.38 G: 3; .375 INJECTION, POWDER, FOR SOLUTION INTRAVENOUS at 03:16

## 2018-05-17 RX ADMIN — APIXABAN 5 MG: 5 TABLET, FILM COATED ORAL at 11:50

## 2018-05-17 RX ADMIN — FENTANYL CITRATE 50 MCG: 50 INJECTION, SOLUTION INTRAMUSCULAR; INTRAVENOUS at 23:21

## 2018-05-17 RX ADMIN — HYDROCODONE BITARTRATE AND ACETAMINOPHEN 1 TABLET: 10; 325 TABLET ORAL at 11:50

## 2018-05-17 RX ADMIN — FENTANYL CITRATE 50 MCG: 50 INJECTION, SOLUTION INTRAMUSCULAR; INTRAVENOUS at 10:05

## 2018-05-17 RX ADMIN — FENTANYL CITRATE 50 MCG: 50 INJECTION, SOLUTION INTRAMUSCULAR; INTRAVENOUS at 01:59

## 2018-05-17 RX ADMIN — CARVEDILOL 12.5 MG: 12.5 TABLET, FILM COATED ORAL at 17:59

## 2018-05-17 RX ADMIN — CARVEDILOL 12.5 MG: 12.5 TABLET, FILM COATED ORAL at 11:52

## 2018-05-17 RX ADMIN — PREDNISONE 5 MG: 5 TABLET ORAL at 11:51

## 2018-05-17 RX ADMIN — ARFORMOTEROL TARTRATE 15 MCG: 15 SOLUTION RESPIRATORY (INHALATION) at 07:23

## 2018-05-17 RX ADMIN — BUDESONIDE 500 MCG: 0.5 INHALANT RESPIRATORY (INHALATION) at 07:24

## 2018-05-17 RX ADMIN — AMLODIPINE BESYLATE 10 MG: 5 TABLET ORAL at 11:51

## 2018-05-17 RX ADMIN — PIPERACILLIN SODIUM,TAZOBACTAM SODIUM 3.38 G: 3; .375 INJECTION, POWDER, FOR SOLUTION INTRAVENOUS at 15:40

## 2018-05-17 RX ADMIN — POLYETHYLENE GLYCOL 3350 17 G: 17 POWDER, FOR SOLUTION ORAL at 11:54

## 2018-05-17 RX ADMIN — ARFORMOTEROL TARTRATE 15 MCG: 15 SOLUTION RESPIRATORY (INHALATION) at 19:30

## 2018-05-17 RX ADMIN — APIXABAN 5 MG: 5 TABLET, FILM COATED ORAL at 20:12

## 2018-05-17 RX ADMIN — GEMFIBROZIL 300 MG: 600 TABLET ORAL at 11:51

## 2018-05-17 RX ADMIN — BUDESONIDE 500 MCG: 0.5 INHALANT RESPIRATORY (INHALATION) at 19:30

## 2018-05-17 RX ADMIN — ALLOPURINOL 100 MG: 100 TABLET ORAL at 11:51

## 2018-05-17 RX ADMIN — FENTANYL CITRATE 50 MCG: 50 INJECTION, SOLUTION INTRAMUSCULAR; INTRAVENOUS at 20:12

## 2018-05-17 RX ADMIN — FENTANYL CITRATE 50 MCG: 50 INJECTION, SOLUTION INTRAMUSCULAR; INTRAVENOUS at 12:55

## 2018-05-17 NOTE — PROGRESS NOTES
Hospitalist Progress Note  Ramila Pal MD  Answering service: [phone] OR [phone] from in house phone      Date of Service:  2018  NAME:  Areli Gao  :  1985  MRN:  [id number]      Admission Summary:   28 yo woman with ESRD on TTSa HD via RUE AVG (previously on PD), anemia, asthma, chronic pain, lumbar DDD, GERD, HLD, HTN, lupus, and h/o PE on apixaban presented to the ED from home on 18 with left lower abdominal pain and missed HD on Sat . She was last admitted at [facility] 3/11/18 - 18 for E coli and Candida peritonitis and sepsis. She had her drain exchanged and upsized by IR on . She completed IV abx on 18. Multiple abdominal CTs revealed interval changes but she still required washout due to loculations and abscess. She was admitted to [facility] with abdominal cavity abscess.     Interval history / Subjective:   CT tube removed   Otherwise in good spirit HD done   Need IV ABX on d/c ID following c/s pending     Assessment & Plan:     Abdominal cavity abscess with sepsis (POA) - fever, tachycardia, leucopenia on admission  - recently completed IV abx for E coli and Candida peritonitis  - BCx  NGTD  - empiric vanc, Zosyn started ; de-escalate prn  - s/p CT-guided drainage by IR with drain left in place   - abscess fluid Cx  pending  - General Surgery following  - pain control; increase Dilaudid q3h prn with hold parameters  - last took apixaban on  or 5/10  - ID on board    Loculated right pleural effusion (POA) - pt c/o IVY  - s/p US-guided drainage by IR with drain left in place   - transudate 30 ml over 24 hrs can take out the drain  - pleural fluid Cx  pending  - consult PCCM to manage drain  - pain control: add lidocaine  - Tube removed     ESRD on HD - TTS HD per Renal via RUE AVG    Lupus - immunosuppressives on hold; still on prednisone    H/o VTE   - Apixaban resume now- resume apixaban when able  - RUE and b/l LE venous dopplers negative DVT    RUE and b/l LE edema - likely due to ESRD as dopplers negative for DVT    Anemia of chronic illness - EPO with HD per Renal    GERD - PPI    Code status: full  DVT prophylaxis: SCDs    Care Plan discussed with: Patient/Family and Nurse  Disposition: TBD need IV abx with HD      Hospital Problems  Date Reviewed: 5/12/2018          Codes Class Noted POA    * (Principal)Abscess of abdominal cavity ([facility] [address] ICD-10-CM: K65.1  ICD-9-CM: 567.22  5/12/2018 Yes            Review of Systems:   Pertinent items are noted in HPI. Vital Signs:    Last 24hrs VS reviewed since prior progress note.  Most recent are:  Visit Vitals    BP (!) 151/112    Pulse (!) 111    Temp 98.4 °F (36.9 °C)    Resp 18    Ht 5' 5\" (1.651 m)    Wt 65 kg (143 lb 4.8 oz)    SpO2 98%    BMI 23.85 kg/m2       Intake/Output Summary (Last 24 hours) at 05/17/18 1125  Last data filed at 05/17/18 1040   Gross per 24 hour   Intake                0 ml   Output             3501 ml   Net            -3501 ml        Physical Examination:     Constitutional:  awake, no acute distress, cooperative, pleasant    ENT:  oral mucosa moist, oropharynx benign    Resp:  diminished BS right base, right chest wall drain   CV:  regular rhythm, normal rate, no m/r/g appreciated, RUE and b/l LE edema, RUE AVG +thrill/bruit    GI:  +BS, soft, non distended, diffusely tender, + abdominal drain     Musculoskeletal:  moves all extremities    Neurologic:  AAOx3, NFD     Skin:  warm, dry; multiple healed abdominal surgical scars    Data Review:    Review and/or order of clinical lab test  Review and/or order of tests in the radiology section of CPT  Review and/or order of tests in the medicine section of CPT    Labs:     Recent Labs      05/17/18 0217 05/16/18   0354   WBC  2.5*  2.5*   HGB  8.3*  8.2*   HCT  27.7*  27.9*   PLT  282  272     Recent Labs      05/17/18   0217  05/16/18   0354  05/15/18 0346   NA  140  141  139   K  4.5  3.8  4.3   CL  101  100  102   CO2  30  33*  28   BUN  24*  16  24*   CREA  5.72*  4.26*  5.66*   GLU  90  96  89   CA  8.2*  8.2*  8.3*   MG  1.7  1.9  2.1   PHOS  3.5  2.7  4.1     Recent Labs      05/17/18   0217  05/16/18   0354  05/15/18   0346   SGOT  17  16  17   ALT  8*  7*  8*   AP  77  81  84   TBILI  0.2  0.2  0.2   TP  6.1*  6.2*  6.3*   ALB  1.6*  1.7*  1.6*   GLOB  4.5*  4.5*  4.7*     No results for input(s): INR, PTP, APTT in the last 72 hours. No lab exists for component: INREXT, INREXT   No results for input(s): FE, TIBC, PSAT, FERR in the last 72 hours. Lab Results   Component Value Date/Time    Folate 4.1 (L) 03/15/2018 10:32 AM      No results for input(s): PH, PCO2, PO2 in the last 72 hours. No results for input(s): CPK, CKNDX, TROIQ in the last 72 hours.     No lab exists for component: CPKMB  Lab Results   Component Value Date/Time    Cholesterol, total 117 09/25/2015 02:02 PM    HDL Cholesterol 31 (L) 09/25/2015 02:02 PM    LDL, calculated 57 09/25/2015 02:02 PM    Triglyceride 146 09/25/2015 02:02 PM    CHOL/HDL Ratio 7.7 (H) 05/31/2009 10:30 AM     Lab Results   Component Value Date/Time    Glucose (POC) 123 (H) 04/20/2018 05:33 PM    Glucose (POC) 95 04/20/2018 11:19 AM    Glucose (POC) 100 04/20/2018 07:18 AM    Glucose (POC) 119 (H) 04/19/2018 09:11 PM    Glucose (POC) 86 04/19/2018 04:30 PM     Lab Results   Component Value Date/Time    Color YELLOW/STRAW 08/12/2016 09:06 PM    Appearance CLEAR 08/12/2016 09:06 PM    Specific gravity 1.017 08/12/2016 09:06 PM    Specific gravity 1.010 07/11/2016 12:44 PM    pH (UA) 7.0 08/12/2016 09:06 PM    Protein 300 (A) 08/12/2016 09:06 PM    Glucose NEGATIVE  08/12/2016 09:06 PM    Ketone TRACE (A) 08/12/2016 09:06 PM    Bilirubin NEGATIVE  07/11/2016 12:44 PM    Urobilinogen 1.0 08/12/2016 09:06 PM    Nitrites NEGATIVE  08/12/2016 09:06 PM    Leukocyte Esterase NEGATIVE  08/12/2016 09:06 PM    Epithelial cells MODERATE (A) 08/12/2016 09:06 PM    Bacteria 1+ (A) 08/12/2016 09:06 PM    WBC 0-4 08/12/2016 09:06 PM    RBC 0-5 08/12/2016 09:06 PM     Medications Reviewed:     Current Facility-Administered Medications   Medication Dose Route Frequency    apixaban (ELIQUIS) tablet 5 mg  5 mg Oral Q12H    fentaNYL citrate (PF) injection 50 mcg  50 mcg IntraVENous Q3H PRN    lidocaine (LIDODERM) 5 % patch 1 Patch  1 Patch TransDERmal Q24H    epoetin pia (EPOGEN;PROCRIT) injection 10,000 Units  10,000 Units SubCUTAneous Q TUE, THU & SAT    allopurinol (ZYLOPRIM) tablet 100 mg  100 mg Oral DAILY AFTER BREAKFAST    amitriptyline (ELAVIL) tablet 25 mg  25 mg Oral QHS PRN    carvedilol (COREG) tablet 12.5 mg  12.5 mg Oral BID WITH MEALS    gemfibrozil (LOPID) tablet 300 mg  300 mg Oral DAILY    pantoprazole (PROTONIX) tablet 40 mg  40 mg Oral ACB    polyethylene glycol (MIRALAX) packet 17 g  17 g Oral DAILY    predniSONE (DELTASONE) tablet 5 mg  5 mg Oral DAILY    senna (SENOKOT) tablet 8.6 mg  1 Tab Oral DAILY PRN    acetaminophen (TYLENOL) tablet 650 mg  650 mg Oral Q4H PRN    HYDROcodone-acetaminophen (NORCO)  mg tablet 1 Tab  1 Tab Oral Q4H PRN    ondansetron (ZOFRAN) injection 4 mg  4 mg IntraVENous Q4H PRN    bisacodyl (DULCOLAX) tablet 5 mg  5 mg Oral DAILY PRN    bisacodyl (DULCOLAX) suppository 10 mg  10 mg Rectal DAILY PRN    albuterol (PROVENTIL VENTOLIN) nebulizer solution 2.5 mg  2.5 mg Nebulization Q4H PRN    arformoterol (BROVANA) neb solution 15 mcg  15 mcg Nebulization BID RT    And    budesonide (PULMICORT) 500 mcg/2 ml nebulizer suspension  500 mcg Nebulization BID RT    amLODIPine (NORVASC) tablet 10 mg  10 mg Oral DAILY    piperacillin-tazobactam (ZOSYN) 3.375 g in 0.9% sodium chloride (MBP/ADV) 100 mL  3.375 g IntraVENous Q12H    hydrALAZINE (APRESOLINE) 20 mg/mL injection 10 mg  10 mg IntraVENous Q4H PRN ______________________________________________________________________  EXPECTED LENGTH OF STAY: 4d 21h  ACTUAL LENGTH OF STAY:          Derek Mahmood MD

## 2018-05-17 NOTE — ROUTINE PROCESS
Bedside and Verbal shift change report given to DANITZA Matias (oncoming nurse) by Kwabena Steel RN (offgoing nurse).  Report included the following information SBAR, Kardex and MAR. ]

## 2018-05-17 NOTE — PROGRESS NOTES
Care Management Interventions  PCP Verified by CM: Yes  Mode of Transport at Discharge: Other (see comment) (private vehicle)  Transition of Care Consult (CM Consult): [address]: Yes  Discharge Durable Medical Equipment: No  Physical Therapy Consult: No  Occupational Therapy Consult: No  Speech Therapy Consult: No  Current Support Network: Relative's Home, Has Personal Caregivers  Confirm Follow Up Transport: Family  Plan discussed with Pt/Family/Caregiver: Yes  Freedom of Choice Offered: Yes  [address] Provided?: No  Discharge Location  Discharge Placement: Home with home health    CM reviewed chart and noted transfer to [address] [place] [address]. CM received orders to resume home care. Pt was open to [facility] prior to admission. CM sent referral through [facility] to [facility]. [facility] confirmed that they would accept pt back at time of discharge. Plan is to return home with family and [facility]. Family will transport home.   FELICIANO Reyes, ACM

## 2018-05-17 NOTE — PROGRESS NOTES
Bedside shift change report given to Manuela RN (oncoming nurse) by Francisco Key RN (offgoing nurse). Report included the following information SBAR, Kardex and MAR.

## 2018-05-17 NOTE — PROGRESS NOTES
Patient name: Hannah Parker  MRN: [id number]    Nephrology Progress note:    Assessment:  ESRD: on HD [facility]  Recent hx of E. Coli Bacteremia/ E. Coli peritonitis. Increasing intra-abd fluid collection. CT guided Drain placed back in by IR 5/13  Loculated right pleural effusion-> s/p ultrasound guided drainage/pigtail-> d/c 5/16  HTN:stable  Hx of PE: on Eliquis  Lupus: Anemia 2 to ESRD/Lupus: Hgb remains below goal/stable - on EPO  SHPT:   Protein malnutrition: On Nepro  Edema: 3rd spacing-> stable       Plan/Recommendations:  HD 5/19/18  IV Abx. ID following  Nepro  Am labs      Subjective:  HD earlier today. Still having some abd pain. Started flushing drain. Afebrile. ROS:   No nausea, no vomiting  No chest pain, no shortness of breath    Exam:  Visit Vitals    /84 (BP 1 Location: Left arm, BP Patient Position: At rest;Sitting)    Pulse 95    Temp 98.3 °F (36.8 °C)    Resp 18    Ht 5' 5\" (1.651 m)    Wt 65 kg (143 lb 4.8 oz)    SpO2 98%    BMI 23.85 kg/m2     Wt Readings from Last 3 Encounters:   05/17/18 65 kg (143 lb 4.8 oz)   05/09/18 65.3 kg (144 lb)   05/07/18 65.5 kg (144 lb 8 oz)       Intake/Output Summary (Last 24 hours) at 05/17/18 1702  Last data filed at 05/17/18 1240   Gross per 24 hour   Intake              240 ml   Output             3501 ml   Net            -3261 ml       Gen: NAD/Cachectic  HEENT: No icterus, dry mm, no oral exudate, AT/NC  Lungs/Chest wall: Clear  Cardiovascular: Regular rate, normal rhythm.    Abdomen/: Soft, Abd drain  Ext: Improved peripheral edema  CNS: alert and awake.  Answers appropriately      Current Facility-Administered Medications   Medication Dose Route Frequency Last Dose    apixaban (ELIQUIS) tablet 5 mg  5 mg Oral Q12H 5 mg at 05/17/18 1150    fentaNYL citrate (PF) injection 50 mcg  50 mcg IntraVENous Q3H PRN 50 mcg at 05/17/18 1616    lidocaine (LIDODERM) 5 % patch 1 Patch  1 Patch TransDERmal Q24H 1 Patch at 05/17/18 1540    epoetin pia (EPOGEN;PROCRIT) injection 10,000 Units  10,000 Units SubCUTAneous Q TUE, THU & SAT 10,000 Units at 05/15/18 2332    allopurinol (ZYLOPRIM) tablet 100 mg  100 mg Oral DAILY AFTER BREAKFAST 100 mg at 05/17/18 1151    amitriptyline (ELAVIL) tablet 25 mg  25 mg Oral QHS PRN      carvedilol (COREG) tablet 12.5 mg  12.5 mg Oral BID WITH MEALS 12.5 mg at 05/17/18 1152    gemfibrozil (LOPID) tablet 300 mg  300 mg Oral DAILY 300 mg at 05/17/18 1151    pantoprazole (PROTONIX) tablet 40 mg  40 mg Oral ACB Stopped at 05/17/18 0730    polyethylene glycol (MIRALAX) packet 17 g  17 g Oral DAILY 17 g at 05/17/18 1154    predniSONE (DELTASONE) tablet 5 mg  5 mg Oral DAILY 5 mg at 05/17/18 1151    senna (SENOKOT) tablet 8.6 mg  1 Tab Oral DAILY PRN      acetaminophen (TYLENOL) tablet 650 mg  650 mg Oral Q4H  mg at 05/14/18 2316    HYDROcodone-acetaminophen (NORCO)  mg tablet 1 Tab  1 Tab Oral Q4H PRN 1 Tab at 05/17/18 1150    ondansetron (ZOFRAN) injection 4 mg  4 mg IntraVENous Q4H PRN      bisacodyl (DULCOLAX) tablet 5 mg  5 mg Oral DAILY PRN      bisacodyl (DULCOLAX) suppository 10 mg  10 mg Rectal DAILY PRN      albuterol (PROVENTIL VENTOLIN) nebulizer solution 2.5 mg  2.5 mg Nebulization Q4H PRN      arformoterol (BROVANA) neb solution 15 mcg  15 mcg Nebulization BID RT 15 mcg at 05/17/18 0723    And    budesonide (PULMICORT) 500 mcg/2 ml nebulizer suspension  500 mcg Nebulization BID  mcg at 05/17/18 0724    amLODIPine (NORVASC) tablet 10 mg  10 mg Oral DAILY 10 mg at 05/17/18 1151    piperacillin-tazobactam (ZOSYN) 3.375 g in 0.9% sodium chloride (MBP/ADV) 100 mL  3.375 g IntraVENous Q12H 3.375 g at 05/17/18 1540    hydrALAZINE (APRESOLINE) 20 mg/mL injection 10 mg  10 mg IntraVENous Q4H PRN         Labs/Data:    Lab Results   Component Value Date/Time    WBC 2.5 (L) 05/17/2018 02:17 AM    Hemoglobin (POC) 7.8 (L) 01/19/2018 12:30 PM    HGB 8.3 (L) 05/17/2018 02:17 AM    Hematocrit (POC) 23 (L) 01/19/2018 12:30 PM    HCT 27.7 (L) 05/17/2018 02:17 AM    PLATELET 468 04/01/9579 02:17 AM    MCV 88.8 05/17/2018 02:17 AM       Lab Results   Component Value Date/Time    Sodium 140 05/17/2018 02:17 AM    Potassium 4.5 05/17/2018 02:17 AM    Chloride 101 05/17/2018 02:17 AM    CO2 30 05/17/2018 02:17 AM    Anion gap 9 05/17/2018 02:17 AM    Glucose 90 05/17/2018 02:17 AM    BUN 24 (H) 05/17/2018 02:17 AM    Creatinine 5.72 (H) 05/17/2018 02:17 AM    BUN/Creatinine ratio 4 (L) 05/17/2018 02:17 AM    GFR est AA 10 (L) 05/17/2018 02:17 AM    GFR est non-AA 9 (L) 05/17/2018 02:17 AM    Calcium 8.2 (L) 05/17/2018 02:17 AM       Patient seen and examined. Chart reviewed. Labs, data and other pertinent notes reviewed in last 24 hrs.     Discussed with patient    Signed by:  Owen Guevara MD

## 2018-05-17 NOTE — PROGRESS NOTES
Bedside shift change report given to Ai Santana RN (oncoming nurse) by Harvey Mckeon RN (offgoing nurse). Report included the following information SBAR, MAR and Recent Results.

## 2018-05-17 NOTE — PROGRESS NOTES
Pulmonary, Critical Care, and Sleep Medicine~Progress Note    Name: Delmis Enciso MRN: [id number]   : 1985 Hospital: [address]   Date: 2018 10:49 AM Admission: 2018     Impression Plan   1. Pleural effusion, transudate. Suspected to be volume overload + abdominal process   2. E coli peritonitis  3. hx of VTE  4. ESRD, on HD; from Lupus  1. Follow pleural cultures  2. O2 titration above 90%  3. OOB as tolerated  4. Follow up in one week with chest x-ray pa/lateral  5. Doing well and no acute complaints   6. We will be available again to see if needed      Daily Progression:      No acute complaints on HD      Reported less drainage today. Chest film is stable. 5/15  Consult Note requested by Dr Magali Juarez. Patient is back in for hospitalization secondary to abdominal abscess and peritonitis with bibasilar opacifications. Had thoracentesis and pigtail placement on . Continues to drain; path and cultures are unremarkable at this point. Has never had this problem before. Chest film today looks better. Chest tube level at 870ml at 0930. I have reviewed the labs and previous days notes. Pertinent items are noted in HPI. Past Medical History:   Diagnosis Date    Anemia     secondary to lupus    Asthma     no inhaler use in past 2 to 3 years    Carditis     Chronic kidney disease     ESRD    Chronic pain     DDD (degenerative disc disease), lumbar     ESRD (end stage renal disease) (HCC)     GERD (gastroesophageal reflux disease)     Heart failure ([facility] [address]     Hemodialysis patient ([facility] [address] 2017    [address]  Tuesday,  Thursday,  and Saturday.      Hypercholesterolemia     Hypertension     Intractable nausea and vomiting 10/21/2015    Long term (current) use of anticoagulants     Lupus     Lupus (systemic lupus erythematosus) (HCC)     Malignant hypertension with chronic kidney disease stage V ([address]     Peritoneal dialysis status ([facility] [address] 10/2015    x 2 years Stopped 2017 due to infection and removed.  Poor historian 2018    With medications    Thromboembolus ([facility] [address] 2013    lungs    Transfusion history     Last Transfusion 2017  at [facility]      Past Surgical History:   Procedure Laterality Date    HX  SECTION  11/2006    x1    HX OTHER SURGICAL  9/16/15    INSERTION PD CATH; Removed 2017    HX VASCULAR ACCESS Right 2017    Double-Lumen henry catheter upper chest      Prior to Admission medications    Medication Sig Start Date End Date Taking? Authorizing Provider   senna (SENNA) 8.6 mg tablet Take 1 Tab by mouth daily as needed for Constipation. for constipation   Yes Historical Provider   amitriptyline (ELAVIL) 25 mg tablet Take 25 mg by mouth nightly as needed for Sleep. Yes Historical Provider   amLODIPine (NORVASC) 5 mg tablet Take 1 Tab by mouth daily. Patient taking differently: Take 10 mg by mouth daily. 18  Yes Romi Giles MD   polyethylene glycol (MIRALAX) 17 gram packet Take 1 Packet by mouth daily. 18  Yes Romi Giles MD   predniSONE (DELTASONE) 5 mg tablet Take 5 mg by mouth daily. Yes Historical Provider   fluticasone-vilanterol (BREO ELLIPTA) 200-25 mcg/dose inhaler Take 1 Puff by inhalation daily. Rinse mouth out after use 17  Yes Susan Trimble NP   gemfibrozil (LOPID) 600 mg tablet Take 300 mg by mouth daily. 11/3/17  Yes Historical Provider   carvedilol (COREG) 6.25 mg tablet Take 12.5 mg by mouth two (2) times daily (with meals). Yes Historical Provider   azaTHIOprine (IMURAN) 50 mg tablet Take 50 mg by mouth daily (after breakfast). 11/3/17  Yes Historical Provider   albuterol (PROVENTIL HFA, VENTOLIN HFA, PROAIR HFA) 90 mcg/actuation inhaler Take 1-2 Puffs by inhalation every four (4) hours as needed for Wheezing or Shortness of Breath.  10/30/17  Yes bAdi Beaulieu NP   hydroxychloroquine (PLAQUENIL) 200 mg tablet Take 200 mg by mouth two (2) times a day. Yes Darnell Franks MD   allopurinol (ZYLOPRIM) 100 mg tablet Take 100 mg by mouth daily (after breakfast). Needs to TAKE on a full stomach; will cause her to be nauseated. 10/15/15  Yes Historical Provider   cyanocobalamin (VITAMIN B-12) 2,500 mcg sublingual tablet Take 1 Tab by mouth daily. 10/27/15  Yes Arnulfo Shore MD   pantoprazole (PROTONIX) 40 mg tablet Take 1 Tab by mouth Daily (before breakfast). 10/23/15  Yes Yair Page MD   apixaban (ELIQUIS) 5 mg tablet Take 1 Tab by mouth every twelve (12) hours.  18   Venkat Cruz MD     Allergies   Allergen Reactions    Compazine [Prochlorperazine Edisylate] Nausea and Vomiting and Palpitations      Social History   Substance Use Topics    Smoking status: Never Smoker    Smokeless tobacco: Never Used    Alcohol use No      Family History   Problem Relation Age of Onset    Diabetes Father     Hypertension Father     Cancer Other      aunt with breast cancer    Diabetes Mother        OBJECTIVE:     Vital Signs:       Visit Vitals    BP (!) 165/126    Pulse (!) 103    Temp 98.4 °F (36.9 °C)    Resp 18    Ht 5' 5\" (1.651 m)    Wt 65 kg (143 lb 4.8 oz)    SpO2 98%    BMI 23.85 kg/m2      Temp (24hrs), Av.1 °F (36.7 °C), Min:97.8 °F (36.6 °C), Max:98.5 °F (36.9 °C)     Intake/Output:     Last shift: 701 - 1900  In: -   Out: 1     Last 3 shifts: 05/15 1901 -  07  In: 640 [P.O.:640]  Out: 30 [Drains:30]          Intake/Output Summary (Last 24 hours) at 18 1029  Last data filed at 18 0840   Gross per 24 hour   Intake                0 ml   Output                1 ml   Net               -1 ml       Physical Exam:                                        Exam Findings Other   General: No resp distress noted, appears stated age    HEENT:  No ulcers, JVD not elevated, no cervical LAD    Chest: No pectus deformity, normal chest rise b/l    HEART:  RRR, no murmurs/rubs/gallops Lungs:  CTA b/l, no rhonchi/crackles/wheeze, diminished BS at bases    ABD: non rigid mildly distended    EXT: No cyanosis/clubbing/edema, normal peripheral pulses    Skin: No rashes or ulcers, no mottling    Neuro: A/O x 3        Medications:  Current Facility-Administered Medications   Medication Dose Route Frequency    apixaban (ELIQUIS) tablet 5 mg  5 mg Oral Q12H    fentaNYL citrate (PF) injection 50 mcg  50 mcg IntraVENous Q3H PRN    lidocaine (LIDODERM) 5 % patch 1 Patch  1 Patch TransDERmal Q24H    epoetin pia (EPOGEN;PROCRIT) injection 10,000 Units  10,000 Units SubCUTAneous Q TUE, THU & SAT    allopurinol (ZYLOPRIM) tablet 100 mg  100 mg Oral DAILY AFTER BREAKFAST    amitriptyline (ELAVIL) tablet 25 mg  25 mg Oral QHS PRN    carvedilol (COREG) tablet 12.5 mg  12.5 mg Oral BID WITH MEALS    gemfibrozil (LOPID) tablet 300 mg  300 mg Oral DAILY    pantoprazole (PROTONIX) tablet 40 mg  40 mg Oral ACB    polyethylene glycol (MIRALAX) packet 17 g  17 g Oral DAILY    predniSONE (DELTASONE) tablet 5 mg  5 mg Oral DAILY    senna (SENOKOT) tablet 8.6 mg  1 Tab Oral DAILY PRN    acetaminophen (TYLENOL) tablet 650 mg  650 mg Oral Q4H PRN    HYDROcodone-acetaminophen (NORCO)  mg tablet 1 Tab  1 Tab Oral Q4H PRN    ondansetron (ZOFRAN) injection 4 mg  4 mg IntraVENous Q4H PRN    bisacodyl (DULCOLAX) tablet 5 mg  5 mg Oral DAILY PRN    bisacodyl (DULCOLAX) suppository 10 mg  10 mg Rectal DAILY PRN    albuterol (PROVENTIL VENTOLIN) nebulizer solution 2.5 mg  2.5 mg Nebulization Q4H PRN    arformoterol (BROVANA) neb solution 15 mcg  15 mcg Nebulization BID RT    And    budesonide (PULMICORT) 500 mcg/2 ml nebulizer suspension  500 mcg Nebulization BID RT    amLODIPine (NORVASC) tablet 10 mg  10 mg Oral DAILY    piperacillin-tazobactam (ZOSYN) 3.375 g in 0.9% sodium chloride (MBP/ADV) 100 mL  3.375 g IntraVENous Q12H    hydrALAZINE (APRESOLINE) 20 mg/mL injection 10 mg  10 mg IntraVENous Q4H PRN       Labs:  ABG No results for input(s): PHI, PCO2I, PO2I, HCO3I, SO2I, FIO2I in the last 72 hours.      CBC Recent Labs      05/17/18 0217 05/16/18   0354  05/15/18   0346   WBC  2.5*  2.5*  2.2*   HGB  8.3*  8.2*  8.8*   HCT  27.7*  27.9*  29.7*   PLT  282  272  258   MCV  88.8  90.9  90.8   MCH  26.6  26.7  93.0        Metabolic  Panel Recent Labs      05/17/18 0217 05/16/18   0354  05/15/18   0346   NA  140  141  139   K  4.5  3.8  4.3   CL  101  100  102   CO2  30  33*  28   GLU  90  96  89   BUN  24*  16  24*   CREA  5.72*  4.26*  5.66*   CA  8.2*  8.2*  8.3*   MG  1.7  1.9  2.1   PHOS  3.5  2.7  4.1   ALB  1.6*  1.7*  1.6*   SGOT  17  16  17   ALT  8*  7*  8*        Pertinent Labs                SINAN Tyson  5/17/2018

## 2018-05-17 NOTE — DIALYSIS
[facility]  [phone]    Vitals   Pre   Post   Assessment   Pre   Post     Temp  Temp: 98.4 °F (36.9 °C) (05/17/18 0710)  98.4 LOC  AXOx3 AXOx3   HR   101 97 Lungs   CTA  CTA   B/P   1159/113 146/112 Cardiac   Monitored  MOnitored   Resp   Resp Rate: 16 (05/17/18 0200) 18 Skin   Cool and dry  Cool and dry   Pain level  0 0 Edema  UE/BLE     No Change   Orders:    Duration:   Start:   0710 End:   Procedure End Time: 0609 Total:   3124   Dialyzer:   Dialyzer/Set Up Inspection: Revaclear (05/17/18 0710)   K Bath:   Dialysate K (mEq/L): 3 (05/17/18 0710)   Ca Bath:   Dialysate CA (mEq/L): 2.5 (05/17/18 0710)   Na/Bicarb:   Dialysate NA (mEq/L): 140 (05/17/18 0710)   Target Fluid Removal:   Goal/Amount of Fluid to Remove (mL): 3500 mL (05/17/18 0710)   Access     Type & Location:   (R) UE- + Thrill and bruit. . No s/s of infection observed. Cannulated with 15G needles x 2 sites and secured with tape. Labs     Obtained/Reviewed   Critical Results Called   Date when labs were drawn-  Hgb-    HGB   Date Value Ref Range Status   05/17/2018 8.3 (L) 11.5 - 16.0 g/dL Final     K-    Potassium   Date Value Ref Range Status   05/17/2018 4.5 3.5 - 5.1 mmol/L Final     Ca-   Calcium   Date Value Ref Range Status   05/17/2018 8.2 (L) 8.5 - 10.1 MG/DL Final     Bun-   BUN   Date Value Ref Range Status   05/17/2018 24 (H) 6 - 20 MG/DL Final     Creat-   Creatinine   Date Value Ref Range Status   05/17/2018 5.72 (H) 0.55 - 1.02 MG/DL Final     Comment:     INVESTIGATED PER DELTA CHECK PROTOCOL        Medications/ Blood Products Given     Name   Dose   Route and Time     N/A                Blood Volume Processed (BVP):    76.8 Net Fluid   Removed:  4000 mL( Prime and rinse included). Comments   Time Out Done: Yes  Primary Nurse Rpt Pre: Brian Woods RN  Primary Nurse Rpt Post: Chrissie Tolentino RN  Pt Education: Procedural  Care Plan: Continue HD as prescribed.   Tx Summary: Patient tolerated treatment well for 3.5 hours. Bp remained high throughout treatment, primary nurse was made aware. All possible blood returned back without any problems. 15 G needles removed x 2 sites. Hemostasis achieved via handheld pressure x 5 minutes. Left patient in bed in stable condition and reported off to primary nurse. Dialyzer lot#- [id number]  Tubing lot# - [id number]  Admiting Diagnosis:  Pt's previous clinic-  Consent signed - Informed Consent Verified: Yes (05/17/18 0710)  [facility] Consent - Yes  Hepatitis Status-  Immune-01/11/2018  Machine #- Machine Number: [id number] (05/17/18 0710)  Telemetry status-Monitored. Pre-dialysis wt. - Pre-Dialysis Weight: 65 kg (143 lb 4.8 oz) (05/17/18 0710)

## 2018-05-17 NOTE — PROGRESS NOTES
Bedside and Verbal shift change report given to [facility] RN (oncoming nurse) by Seth Dominguez (offgoing nurse). Report included the following information SBAR, Kardex, Intake/Output and Med Rec Status.

## 2018-05-17 NOTE — PROGRESS NOTES
Progress Note    Patient: Jessica Guerra MRN: [id number]  SSN: [id number]    YOB: 1985  Age: 29 y.o. Sex: female      Admit Date: 2018    * No surgery found *    Procedure:      Subjective:     Patient  has no new complaints     Objective:     Visit Vitals    BP (!) 151/112    Pulse (!) 111    Temp 98.4 °F (36.9 °C)    Resp 18    Ht 5' 5\" (1.651 m)    Wt 143 lb 4.8 oz (65 kg)    SpO2 98%    BMI 23.85 kg/m2       Temp (24hrs), Av.1 °F (36.7 °C), Min:97.8 °F (36.6 °C), Max:98.4 °F (36.9 °C)      Physical Exam:    Gen- Alert in NAD  Abd- Soft minimal tenderness drain with minimal drainage. Data Review: images and reports reviewed  CT-  Slight decrease size of the anterior peritoneal thick-walled 10.7 x 2.9 x  13.7 cm fluid collection. Drainage catheter is in good position. Lab Review: All lab results for the last 24 hours reviewed.   Recent Results (from the past 24 hour(s))   CBC W/O DIFF    Collection Time: 18  2:17 AM   Result Value Ref Range    WBC 2.5 (L) 3.6 - 11.0 K/uL    RBC 3.12 (L) 3.80 - 5.20 M/uL    HGB 8.3 (L) 11.5 - 16.0 g/dL    HCT 27.7 (L) 35.0 - 47.0 %    MCV 88.8 80.0 - 99.0 FL    MCH 26.6 26.0 - 34.0 PG    MCHC 30.0 30.0 - 36.5 g/dL    RDW 14.7 (H) 11.5 - 14.5 %    PLATELET 995 191 - 921 K/uL    MPV 9.8 8.9 - 12.9 FL    NRBC 0.0 0  WBC    ABSOLUTE NRBC 0.00 0.00 - 0.01 K/uL   MAGNESIUM    Collection Time: 18  2:17 AM   Result Value Ref Range    Magnesium 1.7 1.6 - 2.4 mg/dL   METABOLIC PANEL, COMPREHENSIVE    Collection Time: 05/17/18  2:17 AM   Result Value Ref Range    Sodium 140 136 - 145 mmol/L    Potassium 4.5 3.5 - 5.1 mmol/L    Chloride 101 97 - 108 mmol/L    CO2 30 21 - 32 mmol/L    Anion gap 9 5 - 15 mmol/L    Glucose 90 65 - 100 mg/dL    BUN 24 (H) 6 - 20 MG/DL    Creatinine 5.72 (H) 0.55 - 1.02 MG/DL    BUN/Creatinine ratio 4 (L) 12 - 20      GFR est AA 10 (L) >60 ml/min/1.73m2    GFR est non-AA 9 (L) >60 ml/min/1.73m2 Calcium 8.2 (L) 8.5 - 10.1 MG/DL    Bilirubin, total 0.2 0.2 - 1.0 MG/DL    ALT (SGPT) 8 (L) 12 - 78 U/L    AST (SGOT) 17 15 - 37 U/L    Alk. phosphatase 77 45 - 117 U/L    Protein, total 6.1 (L) 6.4 - 8.2 g/dL    Albumin 1.6 (L) 3.5 - 5.0 g/dL    Globulin 4.5 (H) 2.0 - 4.0 g/dL    A-G Ratio 0.4 (L) 1.1 - 2.2     PHOSPHORUS    Collection Time: 05/17/18  2:17 AM   Result Value Ref Range    Phosphorus 3.5 2.6 - 4.7 MG/DL       Assessment:     Hospital Problems  Date Reviewed: 5/12/2018          Codes Class Noted POA    * (Principal)Abscess of abdominal cavity ([facility] [address] ICD-10-CM: K65.1  ICD-9-CM: 567.22  5/12/2018 Yes              Plan/Recommendations/Medical Decision Making:   Flush drainage tube TID-Would not remove anytime soon  She can go home with the tube.        Signed By: Rosalba Haley MD     May 17, 2018

## 2018-05-18 VITALS
WEIGHT: 137.5 LBS | TEMPERATURE: 98.2 F | DIASTOLIC BLOOD PRESSURE: 94 MMHG | HEIGHT: 65 IN | HEART RATE: 100 BPM | RESPIRATION RATE: 18 BRPM | OXYGEN SATURATION: 97 % | SYSTOLIC BLOOD PRESSURE: 147 MMHG | BODY MASS INDEX: 22.91 KG/M2

## 2018-05-18 PROBLEM — K65.1 ABSCESS OF ABDOMINAL CAVITY (HCC): Status: RESOLVED | Noted: 2018-05-12 | Resolved: 2018-05-18

## 2018-05-18 LAB
ALBUMIN SERPL-MCNC: 1.6 G/DL (ref 3.5–5)
ALBUMIN/GLOB SERPL: 0.4 {RATIO} (ref 1.1–2.2)
ALP SERPL-CCNC: 78 U/L (ref 45–117)
ALT SERPL-CCNC: 7 U/L (ref 12–78)
ANION GAP SERPL CALC-SCNC: 8 MMOL/L (ref 5–15)
AST SERPL-CCNC: 17 U/L (ref 15–37)
BILIRUB SERPL-MCNC: 0.2 MG/DL (ref 0.2–1)
BUN SERPL-MCNC: 20 MG/DL (ref 6–20)
BUN/CREAT SERPL: 5 (ref 12–20)
CALCIUM SERPL-MCNC: 8.1 MG/DL (ref 8.5–10.1)
CHLORIDE SERPL-SCNC: 103 MMOL/L (ref 97–108)
CO2 SERPL-SCNC: 29 MMOL/L (ref 21–32)
CREAT SERPL-MCNC: 4.12 MG/DL (ref 0.55–1.02)
ERYTHROCYTE [DISTWIDTH] IN BLOOD BY AUTOMATED COUNT: 14.6 % (ref 11.5–14.5)
GLOBULIN SER CALC-MCNC: 4.3 G/DL (ref 2–4)
GLUCOSE SERPL-MCNC: 84 MG/DL (ref 65–100)
HCT VFR BLD AUTO: 28 % (ref 35–47)
HGB BLD-MCNC: 8.4 G/DL (ref 11.5–16)
MAGNESIUM SERPL-MCNC: 1.9 MG/DL (ref 1.6–2.4)
MCH RBC QN AUTO: 26.7 PG (ref 26–34)
MCHC RBC AUTO-ENTMCNC: 30 G/DL (ref 30–36.5)
MCV RBC AUTO: 88.9 FL (ref 80–99)
NRBC # BLD: 0 K/UL (ref 0–0.01)
NRBC BLD-RTO: 0 PER 100 WBC
PHOSPHATE SERPL-MCNC: 2.9 MG/DL (ref 2.6–4.7)
PLATELET # BLD AUTO: 268 K/UL (ref 150–400)
PMV BLD AUTO: 9.8 FL (ref 8.9–12.9)
POTASSIUM SERPL-SCNC: 3.9 MMOL/L (ref 3.5–5.1)
PROT SERPL-MCNC: 5.9 G/DL (ref 6.4–8.2)
RBC # BLD AUTO: 3.15 M/UL (ref 3.8–5.2)
SODIUM SERPL-SCNC: 140 MMOL/L (ref 136–145)
WBC # BLD AUTO: 2.5 K/UL (ref 3.6–11)

## 2018-05-18 PROCEDURE — 74011250636 HC RX REV CODE- 250/636: Performed by: INTERNAL MEDICINE

## 2018-05-18 PROCEDURE — 80053 COMPREHEN METABOLIC PANEL: CPT | Performed by: INTERNAL MEDICINE

## 2018-05-18 PROCEDURE — 74011000250 HC RX REV CODE- 250: Performed by: INTERNAL MEDICINE

## 2018-05-18 PROCEDURE — 83735 ASSAY OF MAGNESIUM: CPT | Performed by: INTERNAL MEDICINE

## 2018-05-18 PROCEDURE — 74011250637 HC RX REV CODE- 250/637: Performed by: INTERNAL MEDICINE

## 2018-05-18 PROCEDURE — 85027 COMPLETE CBC AUTOMATED: CPT | Performed by: INTERNAL MEDICINE

## 2018-05-18 PROCEDURE — 3331090002 HH PPS REVENUE DEBIT

## 2018-05-18 PROCEDURE — 74011636637 HC RX REV CODE- 636/637: Performed by: INTERNAL MEDICINE

## 2018-05-18 PROCEDURE — 36415 COLL VENOUS BLD VENIPUNCTURE: CPT | Performed by: INTERNAL MEDICINE

## 2018-05-18 PROCEDURE — 84100 ASSAY OF PHOSPHORUS: CPT | Performed by: INTERNAL MEDICINE

## 2018-05-18 PROCEDURE — 3331090001 HH PPS REVENUE CREDIT

## 2018-05-18 PROCEDURE — 74011250637 HC RX REV CODE- 250/637: Performed by: HOSPITALIST

## 2018-05-18 PROCEDURE — 94640 AIRWAY INHALATION TREATMENT: CPT

## 2018-05-18 PROCEDURE — 74011000258 HC RX REV CODE- 258: Performed by: INTERNAL MEDICINE

## 2018-05-18 RX ORDER — HYDROCODONE BITARTRATE AND ACETAMINOPHEN 10; 325 MG/1; MG/1
1 TABLET ORAL
Qty: 30 TAB | Refills: 0 | Status: SHIPPED | OUTPATIENT
Start: 2018-05-18 | End: 2018-05-25

## 2018-05-18 RX ORDER — OXYCODONE HYDROCHLORIDE 5 MG/1
5 TABLET ORAL ONCE
Status: COMPLETED | OUTPATIENT
Start: 2018-05-18 | End: 2018-05-18

## 2018-05-18 RX ORDER — UREA 10 %
2 LOTION (ML) TOPICAL 2 TIMES DAILY
Qty: 28 TAB | Refills: 0 | Status: SHIPPED | OUTPATIENT
Start: 2018-05-18 | End: 2018-05-25

## 2018-05-18 RX ORDER — AMOXICILLIN AND CLAVULANATE POTASSIUM 500; 125 MG/1; MG/1
1 TABLET, FILM COATED ORAL 2 TIMES DAILY
Qty: 14 TAB | Refills: 0 | Status: SHIPPED | OUTPATIENT
Start: 2018-05-18 | End: 2018-05-25

## 2018-05-18 RX ADMIN — BUDESONIDE 500 MCG: 0.5 INHALANT RESPIRATORY (INHALATION) at 07:35

## 2018-05-18 RX ADMIN — OXYCODONE HYDROCHLORIDE 5 MG: 5 TABLET ORAL at 14:48

## 2018-05-18 RX ADMIN — HYDROCODONE BITARTRATE AND ACETAMINOPHEN 1 TABLET: 10; 325 TABLET ORAL at 13:45

## 2018-05-18 RX ADMIN — APIXABAN 5 MG: 5 TABLET, FILM COATED ORAL at 09:34

## 2018-05-18 RX ADMIN — PREDNISONE 5 MG: 5 TABLET ORAL at 09:34

## 2018-05-18 RX ADMIN — FENTANYL CITRATE 50 MCG: 50 INJECTION, SOLUTION INTRAMUSCULAR; INTRAVENOUS at 03:39

## 2018-05-18 RX ADMIN — PANTOPRAZOLE SODIUM 40 MG: 40 TABLET, DELAYED RELEASE ORAL at 07:15

## 2018-05-18 RX ADMIN — ALLOPURINOL 100 MG: 100 TABLET ORAL at 09:34

## 2018-05-18 RX ADMIN — PIPERACILLIN SODIUM,TAZOBACTAM SODIUM 3.38 G: 3; .375 INJECTION, POWDER, FOR SOLUTION INTRAVENOUS at 03:22

## 2018-05-18 RX ADMIN — AMLODIPINE BESYLATE 10 MG: 5 TABLET ORAL at 09:34

## 2018-05-18 RX ADMIN — GEMFIBROZIL 300 MG: 600 TABLET ORAL at 09:34

## 2018-05-18 RX ADMIN — CARVEDILOL 12.5 MG: 12.5 TABLET, FILM COATED ORAL at 07:15

## 2018-05-18 RX ADMIN — FENTANYL CITRATE 50 MCG: 50 INJECTION, SOLUTION INTRAMUSCULAR; INTRAVENOUS at 10:39

## 2018-05-18 RX ADMIN — FENTANYL CITRATE 50 MCG: 50 INJECTION, SOLUTION INTRAMUSCULAR; INTRAVENOUS at 07:15

## 2018-05-18 RX ADMIN — ARFORMOTEROL TARTRATE 15 MCG: 15 SOLUTION RESPIRATORY (INHALATION) at 07:35

## 2018-05-18 NOTE — PROGRESS NOTES
Micro lab called me and was questioning fluid they have in lab and if it needed a culture? Orders are unclear per RN and micro lab. Called ID and left a message. Will continue to monitor. 1055 spoke with ID and was unaware of any new culture orders, her culture order was done. ID informed pt doesn't need antibiotics for dc. Will continue to monitor.

## 2018-05-18 NOTE — PROGRESS NOTES
I have reviewed discharge instructions with the patient and parent. The patient and parent verbalized understanding. Current Discharge Medication List      START taking these medications    Details   amoxicillin-clavulanate (AUGMENTIN) 500-125 mg per tablet Take 1 Tab by mouth two (2) times a day for 7 days. Qty: 14 Tab, Refills: 0      Lactobacillus Acidoph & Bulgar (FLORANEX) 1 million cell tab tablet Take 2 Tabs by mouth two (2) times a day for 7 days. Qty: 28 Tab, Refills: 0      HYDROcodone-acetaminophen (NORCO)  mg tablet Take 1 Tab by mouth every six (6) hours as needed for up to 7 days. Max Daily Amount: 4 Tabs. Qty: 30 Tab, Refills: 0    Associated Diagnoses: Abscess of abdominal cavity ([facility] [address] Sepsis, due to unspecified organism ([facility] [address]         CONTINUE these medications which have NOT CHANGED    Details   senna (SENNA) 8.6 mg tablet Take 1 Tab by mouth daily as needed for Constipation. for constipation      amitriptyline (ELAVIL) 25 mg tablet Take 25 mg by mouth nightly as needed for Sleep. amLODIPine (NORVASC) 5 mg tablet Take 1 Tab by mouth daily. Qty: 30 Tab, Refills: 1      polyethylene glycol (MIRALAX) 17 gram packet Take 1 Packet by mouth daily. Qty: 30 Packet, Refills: 0      predniSONE (DELTASONE) 5 mg tablet Take 5 mg by mouth daily. fluticasone-vilanterol (BREO ELLIPTA) 200-25 mcg/dose inhaler Take 1 Puff by inhalation daily. Rinse mouth out after use  Qty: 1 Inhaler, Refills: 5    Associated Diagnoses: Acute bronchiolitis with bronchospasm      gemfibrozil (LOPID) 600 mg tablet Take 300 mg by mouth daily. carvedilol (COREG) 6.25 mg tablet Take 12.5 mg by mouth two (2) times daily (with meals). azaTHIOprine (IMURAN) 50 mg tablet Take 50 mg by mouth daily (after breakfast). albuterol (PROVENTIL HFA, VENTOLIN HFA, PROAIR HFA) 90 mcg/actuation inhaler Take 1-2 Puffs by inhalation every four (4) hours as needed for Wheezing or Shortness of Breath.   Qty: 1 Inhaler, Refills: 2      hydroxychloroquine (PLAQUENIL) 200 mg tablet Take 200 mg by mouth two (2) times a day. allopurinol (ZYLOPRIM) 100 mg tablet Take 100 mg by mouth daily (after breakfast). Needs to TAKE on a full stomach; will cause her to be nauseated. cyanocobalamin (VITAMIN B-12) 2,500 mcg sublingual tablet Take 1 Tab by mouth daily. Qty: 90 Tab, Refills: 1    Associated Diagnoses: Leukopenia; Low vitamin B12 level; Hypotension due to drugs      pantoprazole (PROTONIX) 40 mg tablet Take 1 Tab by mouth Daily (before breakfast). Qty: 30 Tab, Refills: 0      apixaban (ELIQUIS) 5 mg tablet Take 1 Tab by mouth every twelve (12) hours.   Qty: 60 Tab, Refills: 1         instructed on Drain care and flushing

## 2018-05-18 NOTE — DISCHARGE INSTRUCTIONS
Discharge Instructions       PATIENT ID: Mya Benoit  MRN: [id number]   YOB: 1985    DATE OF ADMISSION: 5/12/2018 11:57 AM    DATE OF DISCHARGE: 5/18/2018    PRIMARY CARE PROVIDER: Sandee Garcia NP     ATTENDING PHYSICIAN: Valentin Holden MD  DISCHARGING PROVIDER: Valentin Holden MD    To contact this individual call [phone] and ask the  to page. If unavailable ask to be transferred the Adult Hospitalist Department. DISCHARGE DIAGNOSES Abdominal abscess    CONSULTATIONS: IP CONSULT TO NEPHROLOGY  IP CONSULT TO INFECTIOUS DISEASES  IP CONSULT TO GENERAL SURGERY  IP CONSULT TO HOSPITALIST  IP CONSULT TO PULMONOLOGY    PROCEDURES/SURGERIES: * No surgery found *    PENDING TEST RESULTS:   At the time of discharge the following test results are still pending:     FOLLOW UP APPOINTMENTS:   Follow-up Information     Follow up With Details Comments [address] [place] [id number] [facility] KHANH Avila In 1 week  [address] [id number]             ADDITIONAL CARE RECOMMENDATIONS:     DIET: Regular Diet and Cardiac Diet      ACTIVITY: Activity as tolerated    WOUND CARE:     EQUIPMENT needed:       DISCHARGE MEDICATIONS:   See Medication Reconciliation Form    · It is important that you take the medication exactly as they are prescribed. · Keep your medication in the bottles provided by the pharmacist and keep a list of the medication names, dosages, and times to be taken in your wallet. · Do not take other medications without consulting your doctor. NOTIFY YOUR PHYSICIAN FOR ANY OF THE FOLLOWING:   Fever over 101 degrees for 24 hours. Chest pain, shortness of breath, fever, chills, nausea, vomiting, diarrhea, change in mentation, falling, weakness, bleeding. Severe pain or pain not relieved by medications.   Or, any other signs or symptoms that you may have questions about.      DISPOSITION:    Home With:   OT  PT  HH  RN       SNF/Inpatient Rehab/LTAC    Independent/assisted living    Hospice    Other:     CDMP Checked:   Yes x     PROBLEM LIST Updated:  Yes x       Signed:   Lucero Skaggs MD  5/18/2018  11:41 AM

## 2018-05-18 NOTE — PROGRESS NOTES
Hospitalist Progress Note  Lucero Skaggs MD  Answering service: [phone] from in house phone      Date of Service:  2018  NAME:  Carmen Hopper YOB: 1985  MRN:  [id number]      Admission Summary:   28 yo woman with ESRD on TTSa HD via RUE AVG (previously on PD), anemia, asthma, chronic pain, lumbar DDD, GERD, HLD, HTN, lupus, and h/o PE on apixaban presented to the ED from home on 18 with left lower abdominal pain and missed HD on Sat . She was last admitted at [facility] 3/11/18 - 18 for E coli and Candida peritonitis and sepsis. She had her drain exchanged and upsized by IR on . She completed IV abx on 18. Multiple abdominal CTs revealed interval changes but she still required washout due to loculations and abscess. She was admitted to [facility] with abdominal cavity abscess.     Interval history / Subjective:   CT tube removed   Need IV ABX on d/c ID following c/s pending   Has a h/o  E coli and Candida peritonitis  - BCx  NGTD this year ID and surgery following     Assessment & Plan:     Abdominal cavity abscess with sepsis (POA)   - fever, tachycardia, leucopenia on admission  - recently completed IV abx for E coli and Candida peritonitis  - BCx / afb/ fungal cultures NGTD  - empiric vanc, Zosyn started ; de-escalate per ID   - s/p CT-guided drainage by IR with drain left in place - leave it on d/c   - abscess fluid Cx  pending  - General Surgery following  - pain control; increase Dilaudid q3h prn with hold parameters  - ID on board    Loculated right pleural effusion (POA) - pt c/o IVY  - s/p US-guided drainage by IR with drain left in place   - Tube removed   - pleural fluid Cx  pending  - pain control: add lidocaine    ESRD on HD - TTS HD per Renal via RUE AVG    Lupus - immunosuppressives on hold; still on prednisone    H/o VTE   - Apixaban resume now- resume apixaban when able  - RUE and b/l LE venous dopplers negative DVT    RUE and b/l LE edema - likely due to ESRD as dopplers negative for DVT    Anemia of chronic illness - EPO with HD per Renal    GERD - PPI    Code status: Full  DVT prophylaxis: SCDs    Care Plan discussed with: Patient/Family and Nurse  Disposition: TBD may need IV abx with HD      Hospital Problems  Date Reviewed: 5/12/2018          Codes Class Noted POA    * (Principal)Abscess of abdominal cavity ([facility] [address] ICD-10-CM: K65.1  ICD-9-CM: 567.22  5/12/2018 Yes            Review of Systems:   Pertinent items are noted in HPI. Vital Signs:    Last 24hrs VS reviewed since prior progress note.  Most recent are:  Visit Vitals    /87 (BP 1 Location: Left arm, BP Patient Position: At rest)    Pulse 94    Temp 98.7 °F (37.1 °C)    Resp 16    Ht 5' 5\" (1.651 m)    Wt 62.4 kg (137 lb 8 oz)    SpO2 100%    BMI 22.88 kg/m2       Intake/Output Summary (Last 24 hours) at 05/18/18 0745  Last data filed at 05/17/18 2030   Gross per 24 hour   Intake              250 ml   Output             3511 ml   Net            -3261 ml        Physical Examination:     Constitutional:  awake, no acute distress   ENT:  oral mucosa moist, oropharynx benign    Resp:  diminished BS right base   CV:  regular rhythm, normal rate, no m/r/g appreciated, RUE and b/l LE edema, RUE AVG +thrill/bruit    GI:  +BS, soft, non distended, mild tender, + abdominal drain     Musculoskeletal:  moves all extremities    Neurologic:  AAOx3, NFD     Skin:  warm, dry; multiple healed abdominal surgical scars    Data Review:    Review and/or order of clinical lab test  Review and/or order of tests in the radiology section of CPT  Review and/or order of tests in the medicine section of CPT    Labs:     Recent Labs      05/18/18   0337 05/17/18 0217   WBC  2.5*  2.5*   HGB  8.4*  8.3*   HCT  28.0*  27.7*   PLT  268  282     Recent Labs      05/18/18   0337  05/17/18   0217  05/16/18   0354   NA  140  140 141   K  3.9  4.5  3.8   CL  103  101  100   CO2  29  30  33*   BUN  20  24*  16   CREA  4.12*  5.72*  4.26*   GLU  84  90  96   CA  8.1*  8.2*  8.2*   MG  1.9  1.7  1.9   PHOS  2.9  3.5  2.7     Recent Labs      05/18/18   0337  05/17/18   0217  05/16/18   0354   SGOT  17  17  16   ALT  7*  8*  7*   AP  78  77  81   TBILI  0.2  0.2  0.2   TP  5.9*  6.1*  6.2*   ALB  1.6*  1.6*  1.7*   GLOB  4.3*  4.5*  4.5*     No results for input(s): INR, PTP, APTT in the last 72 hours. No lab exists for component: INREXT, INREXT   No results for input(s): FE, TIBC, PSAT, FERR in the last 72 hours. Lab Results   Component Value Date/Time    Folate 4.1 (L) 03/15/2018 10:32 AM      No results for input(s): PH, PCO2, PO2 in the last 72 hours. No results for input(s): CPK, CKNDX, TROIQ in the last 72 hours.     No lab exists for component: CPKMB  Lab Results   Component Value Date/Time    Cholesterol, total 117 09/25/2015 02:02 PM    HDL Cholesterol 31 (L) 09/25/2015 02:02 PM    LDL, calculated 57 09/25/2015 02:02 PM    Triglyceride 146 09/25/2015 02:02 PM    CHOL/HDL Ratio 7.7 (H) 05/31/2009 10:30 AM     Lab Results   Component Value Date/Time    Glucose (POC) 123 (H) 04/20/2018 05:33 PM    Glucose (POC) 95 04/20/2018 11:19 AM    Glucose (POC) 100 04/20/2018 07:18 AM    Glucose (POC) 119 (H) 04/19/2018 09:11 PM    Glucose (POC) 86 04/19/2018 04:30 PM     Lab Results   Component Value Date/Time    Color YELLOW/STRAW 08/12/2016 09:06 PM    Appearance CLEAR 08/12/2016 09:06 PM    Specific gravity 1.017 08/12/2016 09:06 PM    Specific gravity 1.010 07/11/2016 12:44 PM    pH (UA) 7.0 08/12/2016 09:06 PM    Protein 300 (A) 08/12/2016 09:06 PM    Glucose NEGATIVE  08/12/2016 09:06 PM    Ketone TRACE (A) 08/12/2016 09:06 PM    Bilirubin NEGATIVE  07/11/2016 12:44 PM    Urobilinogen 1.0 08/12/2016 09:06 PM    Nitrites NEGATIVE  08/12/2016 09:06 PM    Leukocyte Esterase NEGATIVE  08/12/2016 09:06 PM    Epithelial cells MODERATE (A) 08/12/2016 09:06 PM    Bacteria 1+ (A) 08/12/2016 09:06 PM    WBC 0-4 08/12/2016 09:06 PM    RBC 0-5 08/12/2016 09:06 PM     Medications Reviewed:     Current Facility-Administered Medications   Medication Dose Route Frequency    epoetin pia (EPOGEN;PROCRIT) 14,000 Units  14,000 Units SubCUTAneous Q TUE, THU & SAT    apixaban (ELIQUIS) tablet 5 mg  5 mg Oral Q12H    fentaNYL citrate (PF) injection 50 mcg  50 mcg IntraVENous Q3H PRN    lidocaine (LIDODERM) 5 % patch 1 Patch  1 Patch TransDERmal Q24H    allopurinol (ZYLOPRIM) tablet 100 mg  100 mg Oral DAILY AFTER BREAKFAST    amitriptyline (ELAVIL) tablet 25 mg  25 mg Oral QHS PRN    carvedilol (COREG) tablet 12.5 mg  12.5 mg Oral BID WITH MEALS    gemfibrozil (LOPID) tablet 300 mg  300 mg Oral DAILY    pantoprazole (PROTONIX) tablet 40 mg  40 mg Oral ACB    polyethylene glycol (MIRALAX) packet 17 g  17 g Oral DAILY    predniSONE (DELTASONE) tablet 5 mg  5 mg Oral DAILY    senna (SENOKOT) tablet 8.6 mg  1 Tab Oral DAILY PRN    acetaminophen (TYLENOL) tablet 650 mg  650 mg Oral Q4H PRN    HYDROcodone-acetaminophen (NORCO)  mg tablet 1 Tab  1 Tab Oral Q4H PRN    ondansetron (ZOFRAN) injection 4 mg  4 mg IntraVENous Q4H PRN    bisacodyl (DULCOLAX) tablet 5 mg  5 mg Oral DAILY PRN    bisacodyl (DULCOLAX) suppository 10 mg  10 mg Rectal DAILY PRN    albuterol (PROVENTIL VENTOLIN) nebulizer solution 2.5 mg  2.5 mg Nebulization Q4H PRN    arformoterol (BROVANA) neb solution 15 mcg  15 mcg Nebulization BID RT    And    budesonide (PULMICORT) 500 mcg/2 ml nebulizer suspension  500 mcg Nebulization BID RT    amLODIPine (NORVASC) tablet 10 mg  10 mg Oral DAILY    piperacillin-tazobactam (ZOSYN) 3.375 g in 0.9% sodium chloride (MBP/ADV) 100 mL  3.375 g IntraVENous Q12H    hydrALAZINE (APRESOLINE) 20 mg/mL injection 10 mg  10 mg IntraVENous Q4H PRN     ______________________________________________________________________  EXPECTED LENGTH OF STAY: 4d 21h  ACTUAL LENGTH OF STAY:          6                 Jewel Siegel MD

## 2018-05-18 NOTE — PROGRESS NOTES
Plans of care reviewed in interdisciplinary pt care huddles. Pt is HD dependent, lives with family. No discharge planned for today. Infectious disease is following and will determine plans for discharge/IV abx. CM will follow. Pt is open to [facility] and will resume their care at discharge. Uncertain at this time regarding need/specifics of IV abx at discharge. Bertha Lynch MSW      2:30pm  Pt is discharging home today, cleared by ID. Pt on PO antibiotics. Pt open to [facility]. Spoke with Yaquelin Dominguez, [facility] who confirmed she is aware of discharge for today.     Jennifer Casillas MSW

## 2018-05-18 NOTE — PROGRESS NOTES
Surgery Progress Note    Admit Date: 5/12/2018      Subjective:     No complaints. Objective:     Patient Vitals for the past 8 hrs:   BP Temp Pulse Resp SpO2 Weight   05/18/18 0748 (!) 149/102 98 °F (36.7 °C) 97 18 98 % -   05/18/18 0724 - - - - - 137 lb 8 oz (62.4 kg)   05/18/18 0414 130/87 98.7 °F (37.1 °C) 94 16 100 % -        05/16 1901 - 05/18 0700  In: 250 [P.O.:240]  Out: 3511 [Drains:10]ip  Physical Exam:    General: alert, cooperative, no distress    Abdomen: soft, slight tenderness near drain site. Serous anginous output from pigtail drain. CBC: Lab Results   Component Value Date/Time    WBC 2.5 (L) 05/18/2018 03:37 AM    RBC 3.15 (L) 05/18/2018 03:37 AM    HGB 8.4 (L) 05/18/2018 03:37 AM    HCT 28.0 (L) 05/18/2018 03:37 AM    PLATELET 347 94/99/2154 03:37 AM     BMP: Lab Results   Component Value Date/Time    Glucose 84 05/18/2018 03:37 AM    Sodium 140 05/18/2018 03:37 AM    Potassium 3.9 05/18/2018 03:37 AM    Chloride 103 05/18/2018 03:37 AM    CO2 29 05/18/2018 03:37 AM    BUN 20 05/18/2018 03:37 AM    Creatinine 4.12 (H) 05/18/2018 03:37 AM    Calcium 8.1 (L) 05/18/2018 03:37 AM     CMP:  Lab Results   Component Value Date/Time    Glucose 84 05/18/2018 03:37 AM    Sodium 140 05/18/2018 03:37 AM    Potassium 3.9 05/18/2018 03:37 AM    Chloride 103 05/18/2018 03:37 AM    CO2 29 05/18/2018 03:37 AM    BUN 20 05/18/2018 03:37 AM    Creatinine 4.12 (H) 05/18/2018 03:37 AM    Calcium 8.1 (L) 05/18/2018 03:37 AM    Anion gap 8 05/18/2018 03:37 AM    BUN/Creatinine ratio 5 (L) 05/18/2018 03:37 AM    Alk. phosphatase 78 05/18/2018 03:37 AM    Protein, total 5.9 (L) 05/18/2018 03:37 AM    Albumin 1.6 (L) 05/18/2018 03:37 AM    Globulin 4.3 (H) 05/18/2018 03:37 AM    A-G Ratio 0.4 (L) 05/18/2018 03:37 AM             Assessment:   Patient Active Hospital Problem List:   Abscess of abdominal cavity ([facility] [address] (5/12/2018)          Plan:   Continue to flush drain TID. Do not remove anytime soon.    Plan for discharge home with drain  Further surgical plan per Dr. Jadyn Martínez, covering for Dr. Laxmi Delacruz per Med team.   Carl Molina NP        I have independently examined the patient and have reviewed the chart. I agree with the above plan. Patient without complaints. Plan for continued drain flushing at home. Patient being discharged home today by primary team.  Plan for follow up with Dr. Jaspreet Tolentino in 1-2 weeks.       Carlos Mojica MD  5/18/2018  3:34 PM

## 2018-05-18 NOTE — DISCHARGE SUMMARY
Discharge Summary       PATIENT ID: Benjamin Dear  MRN: [id number]   YOB: 1985    DATE OF ADMISSION: 5/12/2018 11:57 AM    DATE OF DISCHARGE: 5/18/18  PRIMARY CARE PROVIDER: Holly Seth NP     ATTENDING PHYSICIAN: Jimbo Boudreaux  DISCHARGING PROVIDER: Anastasiya Tim MD    To contact this individual call [phone] and ask the  to page. If unavailable ask to be transferred the Adult Hospitalist Department. CONSULTATIONS: IP CONSULT TO NEPHROLOGY  IP CONSULT TO INFECTIOUS DISEASES  IP CONSULT TO GENERAL SURGERY  IP CONSULT TO HOSPITALIST  IP CONSULT TO PULMONOLOGY    PROCEDURES/SURGERIES: * No surgery found *    ADMITTING DIAGNOSES & HOSPITAL COURSE:   30 yo woman with ESRD on TTSa HD via RUE AVG (previously on PD), anemia, asthma, chronic pain, lumbar DDD, GERD, HLD, HTN, lupus, and h/o PE on apixaban presented to the ED from home on 5/12/18 with left lower abdominal pain and missed HD on Sat 5/12. She was last admitted at [facility] 3/11/18 - 4/20/18 for E coli and Candida peritonitis and sepsis. She had her drain exchanged and upsized by IR on 4/5. She completed IV abx on 4/6/18. Multiple abdominal CTs revealed interval changes but she still required washout due to loculations and abscess.  She was admitted to [facility] with abdominal cavity abscess.         Assessment & Plan:      Abdominal cavity abscess with sepsis (POA)   - fever, tachycardia, leucopenia on admission  - recently completed IV abx for E coli and Candida peritonitis  - BCx / afb/ fungal cultures NGTD  - empiric vanc, Zosyn started 5/12; d/c on augmentin  - s/p CT-guided drainage by IR with drain left in place 5/13- leave it on d/c   - abscess fluid Cx 5/13 negative to date  - General Surgery following  - pain control; increase Dilaudid q3h prn with hold parameters  - ID on board     Loculated right pleural effusion (POA) - pt c/o IVY  - s/p US-guided drainage by IR with drain left in place 5/13  - Tube removed 5/16  - pleural fluid Cx 5/13 pending  - pain control: add lidocaine     ESRD on HD - TTS HD per Renal via RUE AVG     Lupus - immunosuppressives on hold; still on prednisone     H/o VTE   - Apixaban resume now- resume apixaban when able  - RUE and b/l LE venous dopplers negative DVT     RUE and b/l LE edema - likely due to ESRD as dopplers negative for DVT     Anemia of chronic illness - EPO with HD per Renal     GERD - PPI       PENDING TEST RESULTS:   At the time of discharge the following test results are still pending:     FOLLOW UP APPOINTMENTS:    Follow-up Information     Follow up With Details Comments Contact Info    [address]   [address]  [facility] [address] [address], NP In 1 week  [address] [facility] [address] [phone]             ADDITIONAL CARE RECOMMENDATIONS:     DIET: cardiac    ACTIVITY: Activity as tolerated    WOUND CARE:     EQUIPMENT needed:       DISCHARGE MEDICATIONS:  Current Discharge Medication List      START taking these medications    Details   amoxicillin-clavulanate (AUGMENTIN) 500-125 mg per tablet Take 1 Tab by mouth two (2) times a day for 7 days. Qty: 14 Tab, Refills: 0      Lactobacillus Acidoph & Bulgar (FLORANEX) 1 million cell tab tablet Take 2 Tabs by mouth two (2) times a day for 7 days. Qty: 28 Tab, Refills: 0      HYDROcodone-acetaminophen (NORCO)  mg tablet Take 1 Tab by mouth every six (6) hours as needed for up to 7 days. Max Daily Amount: 4 Tabs. Qty: 30 Tab, Refills: 0    Associated Diagnoses: Abscess of abdominal cavity ([address] Sepsis, due to unspecified organism ([address]         CONTINUE these medications which have NOT CHANGED    Details   senna (SENNA) 8.6 mg tablet Take 1 Tab by mouth daily as needed for Constipation. for constipation      amitriptyline (ELAVIL) 25 mg tablet Take 25 mg by mouth nightly as needed for Sleep. amLODIPine (NORVASC) 5 mg tablet Take 1 Tab by mouth daily.   Qty: 30 Tab, Refills: 1      polyethylene glycol (MIRALAX) 17 gram packet Take 1 Packet by mouth daily. Qty: 30 Packet, Refills: 0      predniSONE (DELTASONE) 5 mg tablet Take 5 mg by mouth daily. fluticasone-vilanterol (BREO ELLIPTA) 200-25 mcg/dose inhaler Take 1 Puff by inhalation daily. Rinse mouth out after use  Qty: 1 Inhaler, Refills: 5    Associated Diagnoses: Acute bronchiolitis with bronchospasm      gemfibrozil (LOPID) 600 mg tablet Take 300 mg by mouth daily. carvedilol (COREG) 6.25 mg tablet Take 12.5 mg by mouth two (2) times daily (with meals). azaTHIOprine (IMURAN) 50 mg tablet Take 50 mg by mouth daily (after breakfast). albuterol (PROVENTIL HFA, VENTOLIN HFA, PROAIR HFA) 90 mcg/actuation inhaler Take 1-2 Puffs by inhalation every four (4) hours as needed for Wheezing or Shortness of Breath. Qty: 1 Inhaler, Refills: 2      hydroxychloroquine (PLAQUENIL) 200 mg tablet Take 200 mg by mouth two (2) times a day. allopurinol (ZYLOPRIM) 100 mg tablet Take 100 mg by mouth daily (after breakfast). Needs to TAKE on a full stomach; will cause her to be nauseated. cyanocobalamin (VITAMIN B-12) 2,500 mcg sublingual tablet Take 1 Tab by mouth daily. Qty: 90 Tab, Refills: 1    Associated Diagnoses: Leukopenia; Low vitamin B12 level; Hypotension due to drugs      pantoprazole (PROTONIX) 40 mg tablet Take 1 Tab by mouth Daily (before breakfast). Qty: 30 Tab, Refills: 0      apixaban (ELIQUIS) 5 mg tablet Take 1 Tab by mouth every twelve (12) hours. Qty: 60 Tab, Refills: 1               NOTIFY YOUR PHYSICIAN FOR ANY OF THE FOLLOWING:   Fever over 101 degrees for 24 hours. Chest pain, shortness of breath, fever, chills, nausea, vomiting, diarrhea, change in mentation, falling, weakness, bleeding. Severe pain or pain not relieved by medications. Or, any other signs or symptoms that you may have questions about.     DISPOSITION:  x  Home With:   OT  PT  HH  RN       Long term SNF/Inpatient Rehab    Independent/assisted living    Hospice    Other:       PATIENT CONDITION AT DISCHARGE:     Functional status   x Poor     Deconditioned     Independent      Cognition    x Lucid     Forgetful     Dementia      Catheters/lines (plus indication)    Franco     PICC     PEG    x None      Code status   x  Full code     DNR      PHYSICAL EXAMINATION AT DISCHARGE:   Refer to Progress Note      CHRONIC MEDICAL DIAGNOSES:  Problem List as of 5/18/2018  Date Reviewed: 5/18/2018          Codes Class Noted - Resolved    Sepsis ([facility] ICD-10-CM: A41.9  ICD-9-CM: 038.9, 995.91  4/29/2018 - Present        Generalized abdominal pain ICD-10-CM: R10.84  ICD-9-CM: 789.07  4/4/2018 - Present        Other constipation ICD-10-CM: K59.09  ICD-9-CM: 564.09  4/4/2018 - Present        Other ascites ICD-10-CM: R18.8  ICD-9-CM: 789.59  4/4/2018 - Present        Peritonitis ([facility] ICD-10-CM: K65.9  ICD-9-CM: 567.9  3/11/2018 - Present        History of pulmonary embolism ICD-10-CM: U34.287  ICD-9-CM: V12.55  12/8/2017 - Present        Hypomagnesemia ICD-10-CM: E83.42  ICD-9-CM: 275.2  10/30/2017 - Present        Hypocalcemia ICD-10-CM: E83.51  ICD-9-CM: 275.41  10/30/2017 - Present        Hypokalemia ICD-10-CM: E87.6  ICD-9-CM: 276.8  10/27/2017 - Present        Hypertension (Chronic) ICD-10-CM: I10  ICD-9-CM: 401.9  10/14/2017 - Present        Chronic bilateral low back pain without sciatica ICD-10-CM: M54.5, G89.29  ICD-9-CM: 724.2, 338.29  5/26/2017 - Present        Anemia of renal disease ICD-10-CM: D63.1  ICD-9-CM: 285.21  2/21/2017 - Present        Dependence on peritoneal dialysis [facility]) ICD-10-CM: Z99.2  ICD-9-CM: V45.11  2/21/2017 - Present        ACP (advance care planning) ICD-10-CM: Z71.89  ICD-9-CM: V65.49  2/21/2017 - Present        ESRD on peritoneal dialysis ([facility] [address] (Chronic) ICD-10-CM: N18.6, Z99.2  ICD-9-CM: 585.6, V45.11  10/7/2016 - Present        Malignant hypertension ICD-10-CM: I10  ICD-9-CM: 401.0 7/11/2016 - Present        Encounter for monitoring opioid maintenance therapy ICD-10-CM: Z51.81, Z79.891  ICD-9-CM: V58.83, V58.69  11/3/2015 - Present        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  ICD-9-CM: 710.0, 583.81  3/10/2012 - Present        Lupus ICD-10-CM: L93.0  ICD-9-CM: 695.4  2/27/2012 - Present        * (Principal)RESOLVED: Abscess of abdominal cavity ([facility] ICD-10-CM: K65.1  ICD-9-CM: 567.22  5/12/2018 - 5/18/2018        RESOLVED: Sepsis ([facility] ICD-10-CM: A41.9  ICD-9-CM: 038.9, 995.91  12/12/2017 - 12/22/2017        RESOLVED: Pneumonia ICD-10-CM: J18.9  ICD-9-CM: 520  10/14/2017 - 12/8/2017        RESOLVED: Pleural effusion, left ICD-10-CM: J90  ICD-9-CM: 511.9  10/14/2017 - 12/8/2017        RESOLVED: Idiopathic chronic inflammatory bowel disease ICD-10-CM: L97.01  ICD-9-CM: 558.9  8/28/2013 - 8/28/2013        RESOLVED: Abdominal pain ICD-10-CM: R10.9  ICD-9-CM: 789.00  8/28/2013 - 9/3/2013        RESOLVED: Hypoxia ICD-10-CM: R09.02  ICD-9-CM: 799.02  4/25/2013 - 4/30/2013        RESOLVED: 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

## 2018-05-18 NOTE — PROGRESS NOTES
ID  Ascitic fluid culture and pleural fluid neg  She does not need IV antibiotics to go home  Augmentin 500mg every day for a week along with probiotic  Follow with surgery for the drain

## 2018-05-18 NOTE — PROGRESS NOTES
Hospital follow-up PCP transitional care appointment has been scheduled with APRYL Young for Wednesday, 5/23/18 at 1:45 p.m. Pending patient discharge.   Tamy Casey, Care Management Specialist.

## 2018-05-18 NOTE — PROGRESS NOTES
Bedside shift change report given to Manuela RN (oncoming nurse) by Leila Bailey RN (offgoing nurse). Report included the following information SBAR, Kardex and MAR.

## 2018-05-18 NOTE — PROGRESS NOTES
Patient name: Richy Prieto  MRN: [id number]    Nephrology Progress note:    Assessment:  ESRD: on HD [facility]  Recent hx of E. Coli Bacteremia/ E. Coli peritonitis. Increasing intra-abd fluid collection. 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 Still having some abd pain. Started flushing drain yesterday. Afebrile.      ROS:   No nausea, no vomiting  No chest pain, no shortness of breath    Exam:  Visit Vitals    BP (!) 147/94 (BP 1 Location: Left arm, BP Patient Position: At rest)    Pulse 100    Temp 98.2 °F (36.8 °C)    Resp 18    Ht 5' 5\" (1.651 m)    Wt 62.4 kg (137 lb 8 oz)    SpO2 97%    BMI 22.88 kg/m2     Wt Readings from Last 3 Encounters:   05/18/18 62.4 kg (137 lb 8 oz)   05/09/18 65.3 kg (144 lb)   05/07/18 65.5 kg (144 lb 8 oz)       Intake/Output Summary (Last 24 hours) at 05/18/18 1612  Last data filed at 05/18/18 1050   Gross per 24 hour   Intake               20 ml   Output               80 ml   Net              -60 ml       Gen: NAD/Cachectic  HEENT: No icterus, dry mm, no oral exudate, AT/NC  Lungs/Chest wall: Clear  Cardiovascular: Regular rate, normal rhythm. Abdomen/: Soft, Abd drain  Ext: Improved peripheral edema  CNS: alert and awake.  Answers appropriately      Current Facility-Administered Medications   Medication Dose Route Frequency Last Dose    epoetin pia (EPOGEN;PROCRIT) 14,000 Units  14,000 Units SubCUTAneous Q TUE, THU & SAT 14,000 Units at 05/17/18 2026    apixaban (ELIQUIS) tablet 5 mg  5 mg Oral Q12H 5 mg at 05/18/18 0934    fentaNYL citrate (PF) injection 50 mcg  50 mcg IntraVENous Q3H PRN 50 mcg at 05/18/18 1039    lidocaine (LIDODERM) 5 % patch 1 Patch  1 Patch TransDERmal Q24H 1 Patch at 05/17/18 1540    allopurinol (ZYLOPRIM) tablet 100 mg  100 mg Oral DAILY AFTER BREAKFAST 100 mg at 05/18/18 0934    amitriptyline (ELAVIL) tablet 25 mg  25 mg Oral QHS PRN      carvedilol (COREG) tablet 12.5 mg  12.5 mg Oral BID WITH MEALS 12.5 mg at 05/18/18 0715    gemfibrozil (LOPID) tablet 300 mg  300 mg Oral DAILY 300 mg at 05/18/18 0934    pantoprazole (PROTONIX) tablet 40 mg  40 mg Oral ACB 40 mg at 05/18/18 0715    polyethylene glycol (MIRALAX) packet 17 g  17 g Oral DAILY 17 g at 05/17/18 1154    predniSONE (DELTASONE) tablet 5 mg  5 mg Oral DAILY 5 mg at 05/18/18 0934    senna (SENOKOT) tablet 8.6 mg  1 Tab Oral DAILY PRN      acetaminophen (TYLENOL) tablet 650 mg  650 mg Oral Q4H  mg at 05/14/18 2316    HYDROcodone-acetaminophen (NORCO)  mg tablet 1 Tab  1 Tab Oral Q4H PRN 1 Tab at 05/18/18 1345    ondansetron (ZOFRAN) injection 4 mg  4 mg IntraVENous Q4H PRN      bisacodyl (DULCOLAX) tablet 5 mg  5 mg Oral DAILY PRN      bisacodyl (DULCOLAX) suppository 10 mg  10 mg Rectal DAILY PRN      albuterol (PROVENTIL VENTOLIN) nebulizer solution 2.5 mg  2.5 mg Nebulization Q4H PRN      arformoterol (BROVANA) neb solution 15 mcg  15 mcg Nebulization BID RT 15 mcg at 05/18/18 0735    And    budesonide (PULMICORT) 500 mcg/2 ml nebulizer suspension  500 mcg Nebulization BID  mcg at 05/18/18 0735    amLODIPine (NORVASC) tablet 10 mg  10 mg Oral DAILY 10 mg at 05/18/18 0934    piperacillin-tazobactam (ZOSYN) 3.375 g in 0.9% sodium chloride (MBP/ADV) 100 mL  3.375 g IntraVENous Q12H 3.375 g at 05/18/18 0322    hydrALAZINE (APRESOLINE) 20 mg/mL injection 10 mg  10 mg IntraVENous Q4H PRN         Labs/Data:    Lab Results   Component Value Date/Time    WBC 2.5 (L) 05/18/2018 03:37 AM    Hemoglobin (POC) 7.8 (L) 01/19/2018 12:30 PM    HGB 8.4 (L) 05/18/2018 03:37 AM    Hematocrit (POC) 23 (L) 01/19/2018 12:30 PM    HCT 28.0 (L) 05/18/2018 03:37 AM    PLATELET 132 73/02/1828 03:37 AM    MCV 88.9 05/18/2018 03:37 AM       Lab Results   Component Value Date/Time    Sodium 140 05/18/2018 03:37 AM    Potassium 3.9 05/18/2018 03:37 AM    Chloride 103 05/18/2018 03:37 AM    CO2 29 05/18/2018 03:37 AM    Anion gap 8 05/18/2018 03:37 AM    Glucose 84 05/18/2018 03:37 AM    BUN 20 05/18/2018 03:37 AM    Creatinine 4.12 (H) 05/18/2018 03:37 AM    BUN/Creatinine ratio 5 (L) 05/18/2018 03:37 AM    GFR est AA 15 (L) 05/18/2018 03:37 AM    GFR est non-AA 13 (L) 05/18/2018 03:37 AM    Calcium 8.1 (L) 05/18/2018 03:37 AM       Patient seen and examined. Chart reviewed. Labs, data and other pertinent notes reviewed in last 24 hrs.     Discussed with patient and Dr. Prudence Mcmahon by:  Irina Vang MD

## 2018-05-19 ENCOUNTER — HOME CARE VISIT (OUTPATIENT)
Dept: SCHEDULING | Facility: HOME HEALTH | Age: 33
End: 2018-05-19
Payer: MEDICARE

## 2018-05-19 VITALS
RESPIRATION RATE: 18 BRPM | SYSTOLIC BLOOD PRESSURE: 140 MMHG | OXYGEN SATURATION: 99 % | HEART RATE: 66 BPM | BODY MASS INDEX: 22.82 KG/M2 | WEIGHT: 137 LBS | TEMPERATURE: 98.3 F | DIASTOLIC BLOOD PRESSURE: 100 MMHG | HEIGHT: 65 IN

## 2018-05-19 PROCEDURE — 3331090001 HH PPS REVENUE CREDIT

## 2018-05-19 PROCEDURE — 3331090002 HH PPS REVENUE DEBIT

## 2018-05-19 PROCEDURE — G0162 HHC RN E&M PLAN SVS, 15 MIN: HCPCS

## 2018-05-20 PROCEDURE — 3331090002 HH PPS REVENUE DEBIT

## 2018-05-20 PROCEDURE — 3331090001 HH PPS REVENUE CREDIT

## 2018-05-21 ENCOUNTER — HOME CARE VISIT (OUTPATIENT)
Dept: HOME HEALTH SERVICES | Facility: HOME HEALTH | Age: 33
End: 2018-05-21
Payer: MEDICARE

## 2018-05-21 ENCOUNTER — PATIENT OUTREACH (OUTPATIENT)
Dept: FAMILY MEDICINE CLINIC | Age: 33
End: 2018-05-21

## 2018-05-21 ENCOUNTER — TELEPHONE (OUTPATIENT)
Dept: FAMILY MEDICINE CLINIC | Age: 33
End: 2018-05-21

## 2018-05-21 PROCEDURE — 3331090001 HH PPS REVENUE CREDIT

## 2018-05-21 PROCEDURE — 3331090002 HH PPS REVENUE DEBIT

## 2018-05-21 NOTE — TELEPHONE ENCOUNTER
Janet calling from [facility] - INPATIENT on behalf of patient in regards to, notifying patient of the redemption of care on patient. She is requesting a call back in regards to this.      Best call back [phone]

## 2018-05-21 NOTE — PROGRESS NOTES
.  Hospital Discharge Follow-Up      Date/Time:  2018 1:56 PM    Patient was admitted to [address] [place] [address] on 18 and discharged on 18 for [facility] 18-18 for abdomianl cavity abscess and sepsis. The physician discharge summary was available at the time of outreach. Patient was contacted within 1 business days of discharge. Top Challenges reviewed with the provider   -recently completed IV abx for E coli all BC cultures remain negative to date, home on Augmentin for 7 days, thur 18  - home with drainage bag, abscess drainage cul pending at discharge, on 18 cul showed NO FUNGUS ISOLATED 5 DAYS  -consider repeat CBC, HGB 8.2 at discharge         Method of communication with provider :staff message, phone, face-to-face    Inpatient RRAT score: undetermined  Was this a readmission? yes   Patient stated reason for the readmission: abdominal pain    Nurse Navigator (NN) contacted the patient by telephone to perform post hospital discharge assessment. Verified name and  with patient as identifiers. Provided introduction to self, and explanation of the Nurse Navigator role. Reviewed discharge instructions and red flags with patient who verbalized understanding. Patient given an opportunity to ask questions and does not have any further questions or concerns at this time. The patient agrees to contact the PCP office for questions related to their healthcare. NN provided contact information for future reference. Summary of patients top problems:  1. Abdominal abscess-drain removed on 18, cultures remain neg-home on Augmentin  2. ESRD- dialysis on //SAT with [facility]   3. Decreased mobility-[facility] providing skilled care,    Home Health orders at discharge: [address] [facility]: [facility]  Date of initial visit: 18    Durable Medical Equipment ordered/company: none  Durable Medical Equipment received: none    Barriers to care?  None verbalized,Relative's Home, Has Personal Caregivers    Advance Care Planning:   Does patient have an Advance Directive:  referred to Certified Facilitator, patient encouraged to complete documentation, verbalized understanding and says her legal next of kin will make final decisions. Medication:     New Medications at Discharge: Augmentin, Floranex, Norco  Changed Medications at Discharge: none  Discontinued Medications at Discharge: none    Medication reconciliation was performed with patient, who verbalizes understanding of administration of home medications. There were no barriers to obtaining medications identified at this time. Referral to Pharm D needed: no     Current Outpatient Prescriptions   Medication Sig    amoxicillin 500 mg tab Take 500 mg by mouth two (2) times a day.  amoxicillin-clavulanate (AUGMENTIN) 500-125 mg per tablet Take 1 Tab by mouth two (2) times a day for 7 days.  Lactobacillus Acidoph & Bulgar (FLORANEX) 1 million cell tab tablet Take 2 Tabs by mouth two (2) times a day for 7 days.  HYDROcodone-acetaminophen (NORCO)  mg tablet Take 1 Tab by mouth every six (6) hours as needed for up to 7 days. Max Daily Amount: 4 Tabs.  senna (SENNA) 8.6 mg tablet Take 1 Tab by mouth daily as needed for Constipation. for constipation    apixaban (ELIQUIS) 5 mg tablet Take 1 Tab by mouth every twelve (12) hours.  amitriptyline (ELAVIL) 25 mg tablet Take 25 mg by mouth nightly as needed for Sleep.  amLODIPine (NORVASC) 5 mg tablet Take 1 Tab by mouth daily. (Patient taking differently: Take 10 mg by mouth daily.)    polyethylene glycol (MIRALAX) 17 gram packet Take 1 Packet by mouth daily.  predniSONE (DELTASONE) 5 mg tablet Take 5 mg by mouth daily.  fluticasone-vilanterol (BREO ELLIPTA) 200-25 mcg/dose inhaler Take 1 Puff by inhalation daily. Rinse mouth out after use    gemfibrozil (LOPID) 600 mg tablet Take 300 mg by mouth daily.     carvedilol (COREG) 6.25 mg tablet Take 12.5 mg by mouth two (2) times daily (with meals).  azaTHIOprine (IMURAN) 50 mg tablet Take 50 mg by mouth daily (after breakfast).  albuterol (PROVENTIL HFA, VENTOLIN HFA, PROAIR HFA) 90 mcg/actuation inhaler Take 1-2 Puffs by inhalation every four (4) hours as needed for Wheezing or Shortness of Breath.  hydroxychloroquine (PLAQUENIL) 200 mg tablet Take 200 mg by mouth two (2) times a day.  allopurinol (ZYLOPRIM) 100 mg tablet Take 100 mg by mouth daily (after breakfast). Needs to TAKE on a full stomach; will cause her to be nauseated.  cyanocobalamin (VITAMIN B-12) 2,500 mcg sublingual tablet Take 1 Tab by mouth daily.  pantoprazole (PROTONIX) 40 mg tablet Take 1 Tab by mouth Daily (before breakfast). No current facility-administered medications for this visit. There are no discontinued medications. PCP/Specialist follow up:   Future Appointments  Date Time Provider Rocio Hardy   5/23/2018 [facility] Be Determined [facility] [address] [facility]   5/23/2018 3:30 PM KHANH Crow   5/23/2018 To Be Determined Lonn Inches, N [facility]   5/28/2018 To Be Determined Lonn Inches, N [facility]   5/30/2018 To Be Determined Lonn Inches, N [facility]   6/1/2018 11:00 AM KHANH Brown   6/4/2018 To Be Determined Lonn Inches, LPN [facility] [address]   6/6/2018 To Be Determined Lonn Inches, LPN [facility]   6/11/2018 To Be Determined Lonn Inches, LPN [facility]   6/18/2018 To Be Determined Jessica Llamas RN [facility]   6/18/2018 To Be Determined DANITZA Christian          Goals        Patient Stated     Advance Care Plan (pt-stated)            10/19/17- NN did not discuss this with patient. Will plan to discuss with patient during upcoming call. Sc  4/23/18- NN encouraged patient to complete when she comes for her next follow-up visit.  Patient verbalized understanding and will schedule. pk  5/21/18- Patient remains full code. Stating Mother is her legal next of kin, will make decisions for her. pk       Patient/Family verbalizes understanding of self-management of  disease. (pt-stated)            11/29/17- Patient verbalized understanding of hypokalemia and treatment plan was review with patient PK    How can you care for yourself at home? · If your doctor recommends it, eat foods that have a lot of potassium. These include fresh fruits, juices, and vegetables. They also include nuts, beans, and milk. · Be safe with medicines. If your doctor prescribes medicines or potassium supplements, take them exactly as directed. Call your doctor if you have any problems with your medicines. · Get your potassium levels tested as often as your doctor tells you.    4/25/18- Patient verbalized understanding of plan of care, attending dialysis and appointments as scheduled. Was seen by [facility] on 4/23/18. pk    4/30/18- Patient educated on diet restrictions at discharge, NN will continue attempts to reach for follow-up assessment and educate on diet restrictions. Patient attending dialysis at this time on 5/1/18. pk    5/21/18- discharge instructions and medication reconciliation reviewed. Patient verbalized understanding of plan of care. pk       Prevention of infection (pt-stated)            10/19/17- patient will assess/closely monitor her temperatures  and report fevers greater than 101. Patient will assess hydration status by consuming at least six cups of water to avoid dehydration. Patient will take Levaquin as prescribed. sc  10/31/17- NN instructed patient on importance of taking all medications as ordered, follow-ups as scheduled- PK    Call your doctor now or seek immediate medical care if:  ? · You cough up dark brown or bloody mucus (sputum). ? · You have new or worse trouble breathing.    ? · You are dizzy or lightheaded, or you feel like you may faint. ? Watch closely for changes in your health, and be sure to contact your doctor if:  ? · You have a new or higher fever. ? · You are coughing more deeply or more often. ? · You are not getting better after 2 days (48 hours). ? · You do not get better as expected. 4/23/18- Discharge instructions, appointments and medications reviewed. Patient verbalized understanding. pk    5/1/18- discharged on Levaquin 500mg every 48 hrs,take after Dialysis   5/21/18- Patient was discharged on Augmentin for 7 days, started on 5/18-5/25/18.  pk

## 2018-05-21 NOTE — TELEPHONE ENCOUNTER
[phone] attempted to call Richard Benites no answer left message on her VM to call me back in regards to her message

## 2018-05-22 ENCOUNTER — HOME CARE VISIT (OUTPATIENT)
Dept: HOME HEALTH SERVICES | Facility: HOME HEALTH | Age: 33
End: 2018-05-22
Payer: MEDICARE

## 2018-05-22 PROCEDURE — 3331090002 HH PPS REVENUE DEBIT

## 2018-05-22 PROCEDURE — 3331090001 HH PPS REVENUE CREDIT

## 2018-05-23 ENCOUNTER — HOME CARE VISIT (OUTPATIENT)
Dept: SCHEDULING | Facility: HOME HEALTH | Age: 33
End: 2018-05-23
Payer: MEDICARE

## 2018-05-23 VITALS
DIASTOLIC BLOOD PRESSURE: 110 MMHG | OXYGEN SATURATION: 94 % | HEART RATE: 110 BPM | RESPIRATION RATE: 17 BRPM | TEMPERATURE: 98.9 F | SYSTOLIC BLOOD PRESSURE: 170 MMHG

## 2018-05-23 VITALS
SYSTOLIC BLOOD PRESSURE: 152 MMHG | RESPIRATION RATE: 18 BRPM | OXYGEN SATURATION: 96 % | DIASTOLIC BLOOD PRESSURE: 98 MMHG | TEMPERATURE: 98.1 F | HEART RATE: 92 BPM

## 2018-05-23 PROCEDURE — 3331090002 HH PPS REVENUE DEBIT

## 2018-05-23 PROCEDURE — 3331090001 HH PPS REVENUE CREDIT

## 2018-05-23 PROCEDURE — G0300 HHS/HOSPICE OF LPN EA 15 MIN: HCPCS

## 2018-05-24 PROCEDURE — 3331090002 HH PPS REVENUE DEBIT

## 2018-05-24 PROCEDURE — 3331090001 HH PPS REVENUE CREDIT

## 2018-05-25 ENCOUNTER — HOME CARE VISIT (OUTPATIENT)
Dept: SCHEDULING | Facility: HOME HEALTH | Age: 33
End: 2018-05-25
Payer: MEDICARE

## 2018-05-25 PROCEDURE — G0151 HHCP-SERV OF PT,EA 15 MIN: HCPCS

## 2018-05-25 PROCEDURE — 3331090001 HH PPS REVENUE CREDIT

## 2018-05-25 PROCEDURE — 3331090002 HH PPS REVENUE DEBIT

## 2018-05-26 VITALS
TEMPERATURE: 98 F | RESPIRATION RATE: 17 BRPM | OXYGEN SATURATION: 98 % | HEART RATE: 70 BPM | SYSTOLIC BLOOD PRESSURE: 125 MMHG | DIASTOLIC BLOOD PRESSURE: 70 MMHG

## 2018-05-26 PROCEDURE — 3331090002 HH PPS REVENUE DEBIT

## 2018-05-26 PROCEDURE — 3331090001 HH PPS REVENUE CREDIT

## 2018-05-27 PROCEDURE — 3331090001 HH PPS REVENUE CREDIT

## 2018-05-27 PROCEDURE — 3331090002 HH PPS REVENUE DEBIT

## 2018-05-28 ENCOUNTER — HOME CARE VISIT (OUTPATIENT)
Dept: SCHEDULING | Facility: HOME HEALTH | Age: 33
End: 2018-05-28
Payer: MEDICARE

## 2018-05-28 VITALS
TEMPERATURE: 98.7 F | DIASTOLIC BLOOD PRESSURE: 96 MMHG | RESPIRATION RATE: 18 BRPM | OXYGEN SATURATION: 98 % | HEART RATE: 123 BPM | SYSTOLIC BLOOD PRESSURE: 156 MMHG

## 2018-05-28 PROCEDURE — 3331090002 HH PPS REVENUE DEBIT

## 2018-05-28 PROCEDURE — G0299 HHS/HOSPICE OF RN EA 15 MIN: HCPCS

## 2018-05-28 PROCEDURE — 3331090001 HH PPS REVENUE CREDIT

## 2018-05-28 PROCEDURE — G0152 HHCP-SERV OF OT,EA 15 MIN: HCPCS

## 2018-05-29 VITALS
SYSTOLIC BLOOD PRESSURE: 150 MMHG | HEART RATE: 120 BPM | RESPIRATION RATE: 16 BRPM | DIASTOLIC BLOOD PRESSURE: 90 MMHG | TEMPERATURE: 99 F | OXYGEN SATURATION: 93 %

## 2018-05-29 PROCEDURE — 3331090001 HH PPS REVENUE CREDIT

## 2018-05-29 PROCEDURE — 3331090002 HH PPS REVENUE DEBIT

## 2018-05-30 ENCOUNTER — PATIENT OUTREACH (OUTPATIENT)
Dept: FAMILY MEDICINE CLINIC | Age: 33
End: 2018-05-30

## 2018-05-30 ENCOUNTER — HOME CARE VISIT (OUTPATIENT)
Dept: HOME HEALTH SERVICES | Facility: HOME HEALTH | Age: 33
End: 2018-05-30
Payer: MEDICARE

## 2018-05-30 ENCOUNTER — HOME CARE VISIT (OUTPATIENT)
Dept: SCHEDULING | Facility: HOME HEALTH | Age: 33
End: 2018-05-30
Payer: MEDICARE

## 2018-05-30 VITALS
HEART RATE: 75 BPM | TEMPERATURE: 98 F | OXYGEN SATURATION: 98 % | RESPIRATION RATE: 17 BRPM | SYSTOLIC BLOOD PRESSURE: 126 MMHG | DIASTOLIC BLOOD PRESSURE: 74 MMHG

## 2018-05-30 PROCEDURE — 3331090002 HH PPS REVENUE DEBIT

## 2018-05-30 PROCEDURE — 3331090001 HH PPS REVENUE CREDIT

## 2018-05-30 PROCEDURE — G0151 HHCP-SERV OF PT,EA 15 MIN: HCPCS

## 2018-05-30 NOTE — PROGRESS NOTES
Outbound call to patient today. NN was able to reach patient on Cell. Patient verified by 3 identifiers. Patient states she is still going to her dialysis as was scheduled. cancel her last follow-up with PCP because she remembered it too late. Patient offered opportunity to re-schedule an appointment with PCP and she refused at this time. Patient says she is doing alright and has followed up with her Nephrologist.[facility] working with her and has made several visits since last admission. Patient denies any concerns at this time. Patient has appointment on 6//1/18, reviewed with patient:  6/1/2018 11:00 AM Deanne Montes NP     Medication reconciliation completed. .  Current Outpatient Prescriptions   Medication Sig    amoxicillin 500 mg tab Take 500 mg by mouth two (2) times a day.  senna (SENNA) 8.6 mg tablet Take 1 Tab by mouth daily as needed for Constipation. for constipation    apixaban (ELIQUIS) 5 mg tablet Take 1 Tab by mouth every twelve (12) hours.  amitriptyline (ELAVIL) 25 mg tablet Take 25 mg by mouth nightly as needed for Sleep.  amLODIPine (NORVASC) 5 mg tablet Take 1 Tab by mouth daily. (Patient taking differently: Take 10 mg by mouth daily.)    polyethylene glycol (MIRALAX) 17 gram packet Take 1 Packet by mouth daily.  predniSONE (DELTASONE) 5 mg tablet Take 5 mg by mouth daily.  fluticasone-vilanterol (BREO ELLIPTA) 200-25 mcg/dose inhaler Take 1 Puff by inhalation daily. Rinse mouth out after use    gemfibrozil (LOPID) 600 mg tablet Take 300 mg by mouth daily.  carvedilol (COREG) 6.25 mg tablet Take 12.5 mg by mouth two (2) times daily (with meals).  azaTHIOprine (IMURAN) 50 mg tablet Take 50 mg by mouth daily (after breakfast).  albuterol (PROVENTIL HFA, VENTOLIN HFA, PROAIR HFA) 90 mcg/actuation inhaler Take 1-2 Puffs by inhalation every four (4) hours as needed for Wheezing or Shortness of Breath.     hydroxychloroquine (PLAQUENIL) 200 mg tablet Take 200 mg by mouth two (2) times a day.  allopurinol (ZYLOPRIM) 100 mg tablet Take 100 mg by mouth daily (after breakfast). Needs to TAKE on a full stomach; will cause her to be nauseated.  cyanocobalamin (VITAMIN B-12) 2,500 mcg sublingual tablet Take 1 Tab by mouth daily.  pantoprazole (PROTONIX) 40 mg tablet Take 1 Tab by mouth Daily (before breakfast). No current facility-administered medications for this visit. Patient denies barriers at this time. Case management Plan:  NN will continue to attempt contacts with patient by telephone or during office visit within next 7-10 days. Will continue to follow as necessary for the remaining 30 days post visit and will reassess to see if CCM assessment is needed before discharge.

## 2018-05-31 PROCEDURE — 3331090001 HH PPS REVENUE CREDIT

## 2018-05-31 PROCEDURE — 3331090002 HH PPS REVENUE DEBIT

## 2018-06-01 ENCOUNTER — HOME CARE VISIT (OUTPATIENT)
Dept: SCHEDULING | Facility: HOME HEALTH | Age: 33
End: 2018-06-01
Payer: MEDICARE

## 2018-06-01 PROCEDURE — 3331090001 HH PPS REVENUE CREDIT

## 2018-06-01 PROCEDURE — 3331090002 HH PPS REVENUE DEBIT

## 2018-06-01 PROCEDURE — G0152 HHCP-SERV OF OT,EA 15 MIN: HCPCS

## 2018-06-01 PROCEDURE — G0151 HHCP-SERV OF PT,EA 15 MIN: HCPCS

## 2018-06-02 PROCEDURE — 3331090001 HH PPS REVENUE CREDIT

## 2018-06-02 PROCEDURE — 3331090002 HH PPS REVENUE DEBIT

## 2018-06-03 VITALS
RESPIRATION RATE: 17 BRPM | DIASTOLIC BLOOD PRESSURE: 78 MMHG | SYSTOLIC BLOOD PRESSURE: 138 MMHG | HEART RATE: 83 BPM | OXYGEN SATURATION: 98 % | TEMPERATURE: 98 F

## 2018-06-03 PROCEDURE — 3331090002 HH PPS REVENUE DEBIT

## 2018-06-03 PROCEDURE — 3331090001 HH PPS REVENUE CREDIT

## 2018-06-04 ENCOUNTER — HOME CARE VISIT (OUTPATIENT)
Dept: SCHEDULING | Facility: HOME HEALTH | Age: 33
End: 2018-06-04
Payer: MEDICARE

## 2018-06-04 VITALS
OXYGEN SATURATION: 95 % | HEART RATE: 105 BPM | RESPIRATION RATE: 16 BRPM | SYSTOLIC BLOOD PRESSURE: 138 MMHG | DIASTOLIC BLOOD PRESSURE: 79 MMHG | TEMPERATURE: 98.2 F

## 2018-06-04 PROCEDURE — 3331090002 HH PPS REVENUE DEBIT

## 2018-06-04 PROCEDURE — G0300 HHS/HOSPICE OF LPN EA 15 MIN: HCPCS

## 2018-06-04 PROCEDURE — 3331090001 HH PPS REVENUE CREDIT

## 2018-06-05 ENCOUNTER — OFFICE VISIT (OUTPATIENT)
Dept: NEUROLOGY | Age: 33
End: 2018-06-05

## 2018-06-05 VITALS
DIASTOLIC BLOOD PRESSURE: 110 MMHG | BODY MASS INDEX: 22.82 KG/M2 | SYSTOLIC BLOOD PRESSURE: 172 MMHG | WEIGHT: 137 LBS | HEIGHT: 65 IN

## 2018-06-05 DIAGNOSIS — M54.50 CHRONIC BILATERAL LOW BACK PAIN WITHOUT SCIATICA: ICD-10-CM

## 2018-06-05 DIAGNOSIS — G89.29 CHRONIC BILATERAL LOW BACK PAIN WITHOUT SCIATICA: ICD-10-CM

## 2018-06-05 PROCEDURE — 3331090002 HH PPS REVENUE DEBIT

## 2018-06-05 PROCEDURE — 3331090001 HH PPS REVENUE CREDIT

## 2018-06-05 RX ORDER — OXYCODONE HYDROCHLORIDE 5 MG/1
TABLET ORAL
Qty: 40 TAB | Refills: 0 | Status: SHIPPED | OUTPATIENT
Start: 2018-06-05 | End: 2018-07-02

## 2018-06-05 NOTE — PATIENT INSTRUCTIONS
[facility]   Statement to Patients  April 1, 2014      In an effort to ensure the large volume of patient prescription refills is processed in the most efficient and expeditious manner, we are asking our patients to assist us by calling your Pharmacy for all prescription refills, this will include also your  Mail Order Pharmacy. The pharmacy will contact our office electronically to continue the refill process. Please do not wait until the last minute to call your pharmacy. We need at least 48 hours (2days) to fill prescriptions. We also encourage you to call your pharmacy before going to  your prescription to make sure it is ready. With regard to controlled substance prescription refill requests (narcotic refills) that need to be picked up at our office, we ask your cooperation by providing us with at least 72 hours (3days) notice that you will need a refill. We will not refill narcotic prescription refill requests after 4:00pm on any weekday, Monday through Thursday, or after 2:00pm on Fridays, or on the weekends. We encourage everyone to explore another way of getting your prescription refill request processed using [employer], our patient web portal through our electronic medical record system. [employer] is an efficient and effective way to communicate your medication request directly to the office and  downloadable as an judith on your smart phone . [employer] also features a review functionality that allows you to view your medication list as well as leave messages for your physician. Are you ready to get connected? If so please review the attatched instructions or speak to any of our staff to get you set up right away! Thank you so much for your cooperation. Should you have any questions please contact our Practice Administrator.     The Physicians and Staff,  [facility]

## 2018-06-05 NOTE — MR AVS SNAPSHOT
Timmy Ordoñez 
 
 
 [place] [address] [address] [phone] Patient: Hannah Parker MRN: [id number] :1985 Visit Information Date & Time Provider Department Dept. Phone Encounter #  
 2018  8:00 AM Edgar Nolan NP [facility]rology [facility] [phone] [id number] Follow-up Instructions Return in about 1 month (around 2018). Your Appointments 2018  2:20 PM  
Follow Up with Tracy Almeida MD  
[facility]) Appt Note: pain management jaimiew [place] [address] [address] [phone]  
  
   
 [place] [id number] [phone]  [address] Upcoming Health Maintenance Date Due DTaP/Tdap/Td series (1 - Tdap) 2007 Pneumococcal 19-64 Highest Risk (2 of 3 - PCV13) 10/1/2010 MEDICARE YEARLY EXAM 3/14/2018 Influenza Age 5 to Adult 2018 PAP AKA CERVICAL CYTOLOGY 2019 Allergies as of 2018  Review Complete On: 2018 By: John Edgar Severity Noted Reaction Type Reactions Compazine [Prochlorperazine Edisylate] High 2016   Systemic Nausea and Vomiting, Palpitations Current Immunizations  Reviewed on 3/16/2018 Name Date Influenza Vaccine 2017, 2012 Influenza Vaccine Split 2011 ZZZ-RETIRED (DO NOT USE) Pneumococcal Vaccine (Unspecified Type) 10/1/2009 Not reviewed this visit You Were Diagnosed With   
  
 Codes Comments Chronic bilateral low back pain without sciatica     ICD-10-CM: M54.5, G89.29 ICD-9-CM: 724.2, 338.29 Vitals BP Height(growth percentile) Weight(growth percentile) BMI OB Status Smoking Status (!) 172/110 5' 5\" (1.651 m) 137 lb (62.1 kg) 22.8 kg/m2 Medically Induced Never Smoker BMI and BSA Data Body Mass Index Body Surface Area 22.8 kg/m 2 1.69 m 2 Preferred Pharmacy Pharmacy Name Phone [address], [address] Your Updated Medication List  
  
   
This list is accurate as of 6/5/18  9:11 AM.  Always use your most recent med list.  
  
  
  
  
 albuterol 90 mcg/actuation inhaler Commonly known as:  PROVENTIL HFA, VENTOLIN HFA, PROAIR HFA Take 1-2 Puffs by inhalation every four (4) hours as needed for Wheezing or Shortness of Breath. allopurinol 100 mg tablet Commonly known as:  Geradine Colder Take 100 mg by mouth daily (after breakfast). Needs to TAKE on a full stomach; will cause her to be nauseated. amitriptyline 25 mg tablet Commonly known as:  ELAVIL Take 25 mg by mouth nightly as needed for Sleep. amLODIPine 5 mg tablet Commonly known as:  Asmita Chi Take 1 Tab by mouth daily. amoxicillin 500 mg Tab Take 500 mg by mouth two (2) times a day. apixaban 5 mg tablet Commonly known as:  Margarie Cesar Take 1 Tab by mouth every twelve (12) hours. azaTHIOprine 50 mg tablet Commonly known as:  [employer] Take 50 mg by mouth daily (after breakfast). COREG 6.25 mg tablet Generic drug:  carvedilol Take 12.5 mg by mouth two (2) times daily (with meals). cyanocobalamin 2,500 mcg sublingual tablet Commonly known as:  VITAMIN B-12 Take 1 Tab by mouth daily. fluticasone-vilanterol 200-25 mcg/dose inhaler Commonly known as:  BREO ELLIPTA Take 1 Puff by inhalation daily. Rinse mouth out after use  
  
 gemfibrozil 600 mg tablet Commonly known as:  LOPID Take 300 mg by mouth daily. oxyCODONE IR 5 mg immediate release tablet Commonly known as:  Major Sundar Take 1 tab in AM and HALF to 1 tab in PM if needed on days where back pain is severe  
  
 pantoprazole 40 mg tablet Commonly known as:  PROTONIX Take 1 Tab by mouth Daily (before breakfast). PLAQUENIL 200 mg tablet Generic drug:  hydroxychloroquine Take 200 mg by mouth two (2) times a day. polyethylene glycol 17 gram packet Commonly known as:  Angus Bimler Take 1 Packet by mouth daily. predniSONE 5 mg tablet Commonly known as:  Graydon Alok Take 5 mg by mouth daily. Senna 8.6 mg tablet Generic drug:  senna Take 1 Tab by mouth daily as needed for Constipation. for constipation Prescriptions Printed Refills  
 oxyCODONE IR (ROXICODONE) 5 mg immediate release tablet 0 Sig: Take 1 tab in AM and HALF to 1 tab in PM if needed on days where back pain is severe   
 Class: Print Follow-up Instructions Return in about 1 month (around 7/5/2018). To-Do List   
 06/06/2018 To Be Determined Appointment with Drake Greenwood OT at [place] [address]  
  
 06/08/2018 To Be Determined Appointment with Drake Greenwood OT at [place] [address]  
  
 06/11/2018 To Be Determined Appointment with Drake Greenwood OT at [place] [address]  
  
 06/11/2018 To Be Determined Appointment with Sloan Srivastava LPN at [place] [address]  
  
 06/13/2018 To Be Determined Appointment with Drake Greenwood OT at [place] [address]  
  
 06/13/2018 To Be Determined Appointment with Sloan Srivastava LPN at [place] [address]  
  
 06/18/2018 To Be Determined Appointment with Tammy Demarco. DANITZA Mathews at [place] [address]  
  
 06/18/2018 To Be Determined Appointment with Tammy Demarco. DANITZA Mathews at [place] [address] Patient Instructions PRESCRIPTION REFILL POLICY [facility] Statement to Patients April 1, 2014 In an effort to ensure the large volume of patient prescription refills is processed in the most efficient and expeditious manner, we are asking our patients to assist us by calling your Pharmacy for all prescription refills, this will include also your  Mail Order Pharmacy. The pharmacy will contact our office electronically to continue the refill process. Please do not wait until the last minute to call your pharmacy. We need at least 48 hours (2days) to fill prescriptions. We also encourage you to call your pharmacy before going to  your prescription to make sure it is ready. With regard to controlled substance prescription refill requests (narcotic refills) that need to be picked up at our office, we ask your cooperation by providing us with at least 72 hours (3days) notice that you will need a refill. We will not refill narcotic prescription refill requests after 4:00pm on any weekday, Monday through Thursday, or after 2:00pm on Fridays, or on the weekends. We encourage everyone to explore another way of getting your prescription refill request processed using [employer], our patient web portal through our electronic medical record system. [employer] is an efficient and effective way to communicate your medication request directly to the office and  downloadable as an judith on your smart phone . [employer] also features a review functionality that allows you to view your medication list as well as leave messages for your physician. Are you ready to get connected? If so please review the attatched instructions or speak to any of our staff to get you set up right away! Thank you so much for your cooperation. Should you have any questions please contact our Practice Administrator. The Physicians and Staff,  [facility] Introducing [facility] SERVICES! [facility] introduces [employer] patient portal. Now you can access parts of your medical record, email your doctor's office, and request medication refills online.    
 
1. In your internet browser, go to [email or web address] 2. Click on the First Time User? Click Here link in the Sign In box. You will see the New Member Sign Up page. 3. Enter your [employer] Access Code exactly as it appears below. You will not need to use this code after youve completed the sign-up process. If you do not sign up before the expiration date, you must request a new code. · [employer] Access Code: [email or web address] Expires: 9/1/2018  9:07 PM 
 
4. Enter the last four digits of your Social Security Number (xxxx) and Date of Birth (mm/dd/yyyy) as indicated and click Submit. You will be taken to the next sign-up page. 5. Create a [employer] ID. This will be your [employer] login ID and cannot be changed, so think of one that is secure and easy to remember. 6. Create a [employer] password. You can change your password at any time. 7. Enter your Password Reset Question and Answer. This can be used at a later time if you forget your password. 8. Enter your e-mail address. You will receive e-mail notification when new information is available in [employer] [place]. 9. Click Sign Up. You can now view and download portions of your medical record. 10. Click the Download Summary menu link to download a portable copy of your medical information. If you have questions, please visit the Frequently Asked Questions section of the [employer] website. Remember, [employer] is NOT to be used for urgent needs. For medical emergencies, dial 911. Now available from your iPhone and Android! Please provide this summary of care documentation to your next provider. Your primary care clinician is listed as Panfilo Parada. If you have any questions after today's visit, please call [phone].

## 2018-06-05 NOTE — PROGRESS NOTES
Rhett Kim is a 29 y.o. female who presents with the following  Chief Complaint   Patient presents with    Follow-up       HPI Patient of Dr. Abraham Gay comes in for a follow up for pain management. Currently maintaining on Oxycodone 5 mg IR tablets 1 in the AM and half to 1 tablets in the PM PRN. She states she will take the 1/2 tablet maybe 1-3 times a week depending on how long or how active of a day she has had. She states the best pain she can get to is about a 3 and that is today. Sometimes she can get as high as an 8 when she has been more active then normal. She states the pain is located in her low back and is midline along the spine. She has no radiation of pain down the legs. She states she is having a little discomfort in the right arm after a new graft placement for dialysis but overall this has healed nicely. Gets dialysis Tuesday Thursday Saturday at a center. Elavil is helping her sleep better. She was recently hospitalized for sepsis and abdominal infection. Still currently has a drain in place. Has some norco from surgery that she has been taking if needed as she ran out of her current prescription from us. Allergies   Allergen Reactions    Compazine [Prochlorperazine Edisylate] Nausea and Vomiting and Palpitations       Current Outpatient Prescriptions   Medication Sig    oxyCODONE IR (ROXICODONE) 5 mg immediate release tablet Take 1 tab in AM and HALF to 1 tab in PM if needed on days where back pain is severe     senna (SENNA) 8.6 mg tablet Take 1 Tab by mouth daily as needed for Constipation. for constipation    apixaban (ELIQUIS) 5 mg tablet Take 1 Tab by mouth every twelve (12) hours.  amitriptyline (ELAVIL) 25 mg tablet Take 25 mg by mouth nightly as needed for Sleep.  amLODIPine (NORVASC) 5 mg tablet Take 1 Tab by mouth daily. (Patient taking differently: Take 10 mg by mouth daily.)    polyethylene glycol (MIRALAX) 17 gram packet Take 1 Packet by mouth daily.     predniSONE (DELTASONE) 5 mg tablet Take 5 mg by mouth daily.  fluticasone-vilanterol (BREO ELLIPTA) 200-25 mcg/dose inhaler Take 1 Puff by inhalation daily. Rinse mouth out after use    gemfibrozil (LOPID) 600 mg tablet Take 300 mg by mouth daily.  carvedilol (COREG) 6.25 mg tablet Take 12.5 mg by mouth two (2) times daily (with meals).  azaTHIOprine (IMURAN) 50 mg tablet Take 50 mg by mouth daily (after breakfast).  albuterol (PROVENTIL HFA, VENTOLIN HFA, PROAIR HFA) 90 mcg/actuation inhaler Take 1-2 Puffs by inhalation every four (4) hours as needed for Wheezing or Shortness of Breath.  hydroxychloroquine (PLAQUENIL) 200 mg tablet Take 200 mg by mouth two (2) times a day.  allopurinol (ZYLOPRIM) 100 mg tablet Take 100 mg by mouth daily (after breakfast). Needs to TAKE on a full stomach; will cause her to be nauseated.  cyanocobalamin (VITAMIN B-12) 2,500 mcg sublingual tablet Take 1 Tab by mouth daily.  pantoprazole (PROTONIX) 40 mg tablet Take 1 Tab by mouth Daily (before breakfast).  amoxicillin 500 mg tab Take 500 mg by mouth two (2) times a day. No current facility-administered medications for this visit. History   Smoking Status    Never Smoker   Smokeless Tobacco    Never Used       Past Medical History:   Diagnosis Date    Anemia     secondary to lupus    Asthma     no inhaler use in past 2 to 3 years    Carditis     Chronic kidney disease     ESRD    Chronic pain     DDD (degenerative disc disease), lumbar     ESRD (end stage renal disease) (HCC)     GERD (gastroesophageal reflux disease)     [facility] [address]     Hemodialysis patient ([facility] [address] 12/21/2017    [address]  Tuesday,  Thursday,  and Saturday.     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  Poor historian 2018    With medications    Thromboembolus ([facility] [address] 2013    lungs    Transfusion history     Last Transfusion 2017  at [facility]       Past Surgical History:   Procedure Laterality Date    HX  SECTION  11/2006    x1    HX OTHER SURGICAL  9/16/15    INSERTION PD CATH; Removed 2017    HX VASCULAR ACCESS Right 2017    Double-Lumen henry catheter upper chest       Family History   Problem Relation Age of Onset    Diabetes Father     Hypertension Father     Cancer Other      aunt with breast cancer    Diabetes Mother        Social History     Social History    Marital status: SINGLE     Spouse name: N/A    Number of children: N/A    Years of education: N/A     Social History Main Topics    Smoking status: Never Smoker    Smokeless tobacco: Never Used    Alcohol use No    Drug use: Yes     Special: Prescription, OTC    Sexual activity: Not Currently     Other Topics Concern     Service No    Blood Transfusions No    Caffeine Concern No    Occupational Exposure No    Hobby Hazards No    Sleep Concern No    Stress Concern No    Weight Concern No    Special Diet No    Back Care Yes     low back pain    Exercise No    Bike Helmet No    Seat Belt Yes    Self-Exams No     Social History Narrative    Lives with parents and daughter. Review of Systems   Constitutional: Positive for malaise/fatigue. Eyes: Negative for blurred vision, double vision and photophobia. Musculoskeletal: Positive for back pain. Neurological: Positive for tingling, sensory change and weakness. Negative for headaches. Remainder of comprehensive review is negative.      Physical Exam :    Visit Vitals    BP (!) 172/110    Ht 5' 5\" (1.651 m)    Wt 62.1 kg (137 lb)    BMI 22.8 kg/m2       General: Well defined, nourished, and groomed individual in no acute distress.  currently an abdominal drain   Psych: Good mood and bright affect     NEUROLOGICAL EXAMINATION:    Mental Status: Alert and oriented to person, place, and time     Cranial Nerves:    II, III, IV, VI: Visual acuity grossly intact. Visual fields are normal.    Pupils are equal, round, and reactive to light and accommodation.    Extra-ocular movements are full and fluid. Fundoscopic exam was benign, no ptosis or nystagmus.    V-XII: Hearing is grossly intact. Facial features are symmetric, with normal sensation and strength. The palate rises symmetrically and the tongue protrudes midline. Sternocleidomastoids 5/5.      Motor Examination: Normal tone, bulk, and strength, 4/5 muscle strength throughout.      Coordination: Finger to nose was normal.      Gait and Station: unsteady, slow gait.      Reflexes: DTRs 1+ throughout.         Results for orders placed or performed during the hospital encounter of 05/12/18   CULTURE, BLOOD, PAIRED   Result Value Ref Range    Special Requests: NO SPECIAL REQUESTS      Culture result: NO GROWTH 5 DAYS     CULTURE, BODY FLUID W GRAM STAIN   Result Value Ref Range    Special Requests: NO SPECIAL REQUESTS      GRAM STAIN FEW WBCS SEEN      GRAM STAIN NO ORGANISMS SEEN      Culture result: NO GROWTH 4 DAYS     CULTURE, ANAEROBIC   Result Value Ref Range    Special Requests: NO SPECIAL REQUESTS      Culture result: NO GROWTH 4 DAYS     CULTURE, ANAEROBIC   Result Value Ref Range    Special Requests: NO SPECIAL REQUESTS      Culture result: NO GROWTH 4 DAYS     CULTURE, BODY FLUID W GRAM STAIN   Result Value Ref Range    Special Requests: NO SPECIAL REQUESTS      GRAM STAIN 2+ WBCS SEEN      GRAM STAIN NO ORGANISMS SEEN      Culture result: Culture performed on Unspun Fluid      Culture result: NO GROWTH 4 DAYS     AFB CULTURE + SMEAR W/RFLX ID FROM CULTURE   Result Value Ref Range    Source ASCITIC FLUID       AFB Specimen processing Concentration Acid Fast Smear NEGATIVE       Acid Fast Culture PENDING    CULTURE, FUNGUS   Result Value Ref Range    Special Requests: NO SPECIAL REQUESTS      Culture result: NO FUNGUS ISOLATED 19 DAYS     CBC WITH AUTOMATED DIFF   Result Value Ref Range    WBC 3.0 (L) 3.6 - 11.0 K/uL    RBC 3.48 (L) 3.80 - 5.20 M/uL    HGB 9.5 (L) 11.5 - 16.0 g/dL    HCT 31.9 (L) 35.0 - 47.0 %    MCV 91.7 80.0 - 99.0 FL    MCH 27.3 26.0 - 34.0 PG    MCHC 29.8 (L) 30.0 - 36.5 g/dL    RDW 15.1 (H) 11.5 - 14.5 %    PLATELET 612 024 - 437 K/uL    MPV 10.2 8.9 - 12.9 FL    NRBC 0.0 0  WBC    ABSOLUTE NRBC 0.00 0.00 - 0.01 K/uL    NEUTROPHILS 73 32 - 75 %    LYMPHOCYTES 21 12 - 49 %    MONOCYTES 3 (L) 5 - 13 %    EOSINOPHILS 2 0 - 7 %    BASOPHILS 1 0 - 1 %    IMMATURE GRANULOCYTES 0 0.0 - 0.5 %    ABS. NEUTROPHILS 2.2 1.8 - 8.0 K/UL    ABS. LYMPHOCYTES 0.6 (L) 0.8 - 3.5 K/UL    ABS. MONOCYTES 0.1 0.0 - 1.0 K/UL    ABS. EOSINOPHILS 0.1 0.0 - 0.4 K/UL    ABS. BASOPHILS 0.0 0.0 - 0.1 K/UL    ABS. IMM. GRANS. 0.0 0.00 - 0.04 K/UL    DF SMEAR SCANNED      PLATELET COMMENTS Large Platelets      RBC COMMENTS OVALOCYTES  PRESENT        RBC COMMENTS ANISOCYTOSIS  1+        RBC COMMENTS TEARDROP CELLS  PRESENT        RBC COMMENTS HYPOCHROMIA  1+       METABOLIC PANEL, COMPREHENSIVE   Result Value Ref Range    Sodium 138 136 - 145 mmol/L    Potassium 4.6 3.5 - 5.1 mmol/L    Chloride 99 97 - 108 mmol/L    CO2 31 21 - 32 mmol/L    Anion gap 8 5 - 15 mmol/L    Glucose 79 65 - 100 mg/dL    BUN 30 (H) 6 - 20 MG/DL    Creatinine 5.93 (H) 0.55 - 1.02 MG/DL    BUN/Creatinine ratio 5 (L) 12 - 20      GFR est AA 10 (L) >60 ml/min/1.73m2    GFR est non-AA 8 (L) >60 ml/min/1.73m2    Calcium 8.4 (L) 8.5 - 10.1 MG/DL    Bilirubin, total 0.3 0.2 - 1.0 MG/DL    ALT (SGPT) 11 (L) 12 - 78 U/L    AST (SGOT) 28 15 - 37 U/L    Alk.  phosphatase 116 45 - 117 U/L    Protein, total 6.8 6.4 - 8.2 g/dL    Albumin 2.0 (L) 3.5 - 5.0 g/dL    Globulin 4.8 (H) 2.0 - 4.0 g/dL A-G Ratio 0.4 (L) 1.1 - 2.2     LIPASE   Result Value Ref Range    Lipase 229 73 - 393 U/L   LACTIC ACID   Result Value Ref Range    Lactic acid 1.0 0.4 - 2.0 MMOL/L   METABOLIC PANEL, BASIC   Result Value Ref Range    Sodium 137 136 - 145 mmol/L    Potassium 4.9 3.5 - 5.1 mmol/L    Chloride 100 97 - 108 mmol/L    CO2 31 21 - 32 mmol/L    Anion gap 6 5 - 15 mmol/L    Glucose 90 65 - 100 mg/dL    BUN 35 (H) 6 - 20 MG/DL    Creatinine 6.57 (H) 0.55 - 1.02 MG/DL    BUN/Creatinine ratio 5 (L) 12 - 20      GFR est AA 9 (L) >60 ml/min/1.73m2    GFR est non-AA 7 (L) >60 ml/min/1.73m2    Calcium 8.2 (L) 8.5 - 10.1 MG/DL   CBC WITH AUTOMATED DIFF   Result Value Ref Range    WBC 2.8 (L) 3.6 - 11.0 K/uL    RBC 3.15 (L) 3.80 - 5.20 M/uL    HGB 8.5 (L) 11.5 - 16.0 g/dL    HCT 29.2 (L) 35.0 - 47.0 %    MCV 92.7 80.0 - 99.0 FL    MCH 27.0 26.0 - 34.0 PG    MCHC 29.1 (L) 30.0 - 36.5 g/dL    RDW 15.1 (H) 11.5 - 14.5 %    PLATELET 705 703 - 880 K/uL    MPV 10.2 8.9 - 12.9 FL    NRBC 0.0 0  WBC    ABSOLUTE NRBC 0.00 0.00 - 0.01 K/uL    NEUTROPHILS 66 32 - 75 %    LYMPHOCYTES 25 12 - 49 %    MONOCYTES 5 5 - 13 %    EOSINOPHILS 3 0 - 7 %    BASOPHILS 1 0 - 1 %    IMMATURE GRANULOCYTES 0 0.0 - 0.5 %    ABS. NEUTROPHILS 1.9 1.8 - 8.0 K/UL    ABS. LYMPHOCYTES 0.7 (L) 0.8 - 3.5 K/UL    ABS. MONOCYTES 0.1 0.0 - 1.0 K/UL    ABS. EOSINOPHILS 0.1 0.0 - 0.4 K/UL    ABS. BASOPHILS 0.0 0.0 - 0.1 K/UL    ABS. IMM.  GRANS. 0.0 0.00 - 0.04 K/UL    DF SMEAR SCANNED      PLATELET COMMENTS Large Platelets      RBC COMMENTS ANISOCYTOSIS  1+        RBC COMMENTS OVALOCYTES  PRESENT       PROTHROMBIN TIME + INR   Result Value Ref Range    INR 1.1 0.9 - 1.1      Prothrombin time 11.6 (H) 9.0 - 11.1 sec   PTT   Result Value Ref Range    aPTT 30.7 22.1 - 32.0 sec    aPTT, therapeutic range     58.0 - 77.0 SECS   CELL COUNT, BODY FLUID   Result Value Ref Range    BODY FLUID TYPE PLEURAL FLUID      FLUID COLOR PINK      FLUID APPEARANCE CLOUDY      FLUID RBC CT. >100 (H) 0 /cu mm    FLUID NUCLEATED CELLS 898 /cu mm    FLD NEUTROPHILS 3 (A) NRRE %    FLD LYMPHS 85 (A) NRRE %    FLD MONO/MACROPHAGES 10 (A) NRRE %    FLUID MESOTHELIAL 2 (A) NRRE %   LDH, BODY FLUID   Result Value Ref Range    Fluid Type: PLEURAL FLUID      LD, body fld. 88 U/L   PROTEIN TOTAL, FLUID   Result Value Ref Range    Fluid Type: PLEURAL FLUID      Protein total, body fld. 2.5 g/dL   LD   Result Value Ref Range     (H) 81 - 246 U/L   RENAL FUNCTION PANEL   Result Value Ref Range    Sodium 137 136 - 145 mmol/L    Potassium 4.9 3.5 - 5.1 mmol/L    Chloride 102 97 - 108 mmol/L    CO2 26 21 - 32 mmol/L    Anion gap 9 5 - 15 mmol/L    Glucose 82 65 - 100 mg/dL    BUN 34 (H) 6 - 20 MG/DL    Creatinine 6.60 (H) 0.55 - 1.02 MG/DL    BUN/Creatinine ratio 5 (L) 12 - 20      GFR est AA 9 (L) >60 ml/min/1.73m2    GFR est non-AA 7 (L) >60 ml/min/1.73m2    Calcium 8.3 (L) 8.5 - 10.1 MG/DL    Phosphorus 5.4 (H) 2.6 - 4.7 MG/DL    Albumin 1.8 (L) 3.5 - 5.0 g/dL   PROTEIN, TOTAL   Result Value Ref Range    Protein, total 6.8 6.4 - 8.2 g/dL   CBC WITH AUTOMATED DIFF   Result Value Ref Range    WBC 2.7 (L) 3.6 - 11.0 K/uL    RBC 3.20 (L) 3.80 - 5.20 M/uL    HGB 8.5 (L) 11.5 - 16.0 g/dL    HCT 29.0 (L) 35.0 - 47.0 %    MCV 90.6 80.0 - 99.0 FL    MCH 26.6 26.0 - 34.0 PG    MCHC 29.3 (L) 30.0 - 36.5 g/dL    RDW 14.7 (H) 11.5 - 14.5 %    PLATELET 370 843 - 143 K/uL    MPV 9.8 8.9 - 12.9 FL    NRBC 0.0 0  WBC    ABSOLUTE NRBC 0.00 0.00 - 0.01 K/uL    NEUTROPHILS 68 32 - 75 %    LYMPHOCYTES 22 12 - 49 %    MONOCYTES 6 5 - 13 %    EOSINOPHILS 3 0 - 7 %    BASOPHILS 1 0 - 1 %    IMMATURE GRANULOCYTES 0 0.0 - 0.5 %    ABS. NEUTROPHILS 1.9 1.8 - 8.0 K/UL    ABS. LYMPHOCYTES 0.6 (L) 0.8 - 3.5 K/UL    ABS. MONOCYTES 0.2 0.0 - 1.0 K/UL    ABS. EOSINOPHILS 0.1 0.0 - 0.4 K/UL    ABS. BASOPHILS 0.0 0.0 - 0.1 K/UL    ABS. IMM.  GRANS. 0.0 0.00 - 0.04 K/UL    DF AUTOMATED     RENAL FUNCTION PANEL   Result Value Ref Range    Sodium 140 136 - 145 mmol/L    Potassium 3.8 3.5 - 5.1 mmol/L    Chloride 104 97 - 108 mmol/L    CO2 27 21 - 32 mmol/L    Anion gap 9 5 - 15 mmol/L    Glucose 98 65 - 100 mg/dL    BUN 17 6 - 20 MG/DL    Creatinine 4.36 (H) 0.55 - 1.02 MG/DL    BUN/Creatinine ratio 4 (L) 12 - 20      GFR est AA 14 (L) >60 ml/min/1.73m2    GFR est non-AA 12 (L) >60 ml/min/1.73m2    Calcium 8.0 (L) 8.5 - 10.1 MG/DL    Phosphorus 3.4 2.6 - 4.7 MG/DL    Albumin 1.7 (L) 3.5 - 5.0 g/dL   MAGNESIUM   Result Value Ref Range    Magnesium 1.8 1.6 - 2.4 mg/dL   CBC W/O DIFF   Result Value Ref Range    WBC 2.2 (L) 3.6 - 11.0 K/uL    RBC 3.27 (L) 3.80 - 5.20 M/uL    HGB 8.8 (L) 11.5 - 16.0 g/dL    HCT 29.7 (L) 35.0 - 47.0 %    MCV 90.8 80.0 - 99.0 FL    MCH 26.9 26.0 - 34.0 PG    MCHC 29.6 (L) 30.0 - 36.5 g/dL    RDW 14.6 (H) 11.5 - 14.5 %    PLATELET 262 084 - 241 K/uL    MPV 10.3 8.9 - 12.9 FL    NRBC 0.0 0  WBC    ABSOLUTE NRBC 0.00 0.00 - 0.01 K/uL   MAGNESIUM   Result Value Ref Range    Magnesium 2.1 1.6 - 2.4 mg/dL   PHOSPHORUS   Result Value Ref Range    Phosphorus 4.1 2.6 - 4.7 MG/DL   METABOLIC PANEL, COMPREHENSIVE   Result Value Ref Range    Sodium 139 136 - 145 mmol/L    Potassium 4.3 3.5 - 5.1 mmol/L    Chloride 102 97 - 108 mmol/L    CO2 28 21 - 32 mmol/L    Anion gap 9 5 - 15 mmol/L    Glucose 89 65 - 100 mg/dL    BUN 24 (H) 6 - 20 MG/DL    Creatinine 5.66 (H) 0.55 - 1.02 MG/DL    BUN/Creatinine ratio 4 (L) 12 - 20      GFR est AA 11 (L) >60 ml/min/1.73m2    GFR est non-AA 9 (L) >60 ml/min/1.73m2    Calcium 8.3 (L) 8.5 - 10.1 MG/DL    Bilirubin, total 0.2 0.2 - 1.0 MG/DL    ALT (SGPT) 8 (L) 12 - 78 U/L    AST (SGOT) 17 15 - 37 U/L    Alk.  phosphatase 84 45 - 117 U/L    Protein, total 6.3 (L) 6.4 - 8.2 g/dL    Albumin 1.6 (L) 3.5 - 5.0 g/dL    Globulin 4.7 (H) 2.0 - 4.0 g/dL    A-G Ratio 0.3 (L) 1.1 - 2.2     CELL COUNT, BODY FLUID   Result Value Ref Range    BODY FLUID TYPE ASCITIC FLUID       FLUID COLOR RED FLUID APPEARANCE TURBID      FLUID RBC CT. >100 (H) 0 /cu mm    FLUID NUCLEATED CELLS 3615 /cu mm   CBC W/O DIFF   Result Value Ref Range    WBC 2.5 (L) 3.6 - 11.0 K/uL    RBC 3.07 (L) 3.80 - 5.20 M/uL    HGB 8.2 (L) 11.5 - 16.0 g/dL    HCT 27.9 (L) 35.0 - 47.0 %    MCV 90.9 80.0 - 99.0 FL    MCH 26.7 26.0 - 34.0 PG    MCHC 29.4 (L) 30.0 - 36.5 g/dL    RDW 14.7 (H) 11.5 - 14.5 %    PLATELET 912 071 - 148 K/uL    MPV 9.8 8.9 - 12.9 FL    NRBC 0.0 0  WBC    ABSOLUTE NRBC 0.00 0.00 - 0.01 K/uL   MAGNESIUM   Result Value Ref Range    Magnesium 1.9 1.6 - 2.4 mg/dL   PHOSPHORUS   Result Value Ref Range    Phosphorus 2.7 2.6 - 4.7 MG/DL   METABOLIC PANEL, COMPREHENSIVE   Result Value Ref Range    Sodium 141 136 - 145 mmol/L    Potassium 3.8 3.5 - 5.1 mmol/L    Chloride 100 97 - 108 mmol/L    CO2 33 (H) 21 - 32 mmol/L    Anion gap 8 5 - 15 mmol/L    Glucose 96 65 - 100 mg/dL    BUN 16 6 - 20 MG/DL    Creatinine 4.26 (H) 0.55 - 1.02 MG/DL    BUN/Creatinine ratio 4 (L) 12 - 20      GFR est AA 15 (L) >60 ml/min/1.73m2    GFR est non-AA 12 (L) >60 ml/min/1.73m2    Calcium 8.2 (L) 8.5 - 10.1 MG/DL    Bilirubin, total 0.2 0.2 - 1.0 MG/DL    ALT (SGPT) 7 (L) 12 - 78 U/L    AST (SGOT) 16 15 - 37 U/L    Alk.  phosphatase 81 45 - 117 U/L    Protein, total 6.2 (L) 6.4 - 8.2 g/dL    Albumin 1.7 (L) 3.5 - 5.0 g/dL    Globulin 4.5 (H) 2.0 - 4.0 g/dL    A-G Ratio 0.4 (L) 1.1 - 2.2     CBC W/O DIFF   Result Value Ref Range    WBC 2.5 (L) 3.6 - 11.0 K/uL    RBC 3.12 (L) 3.80 - 5.20 M/uL    HGB 8.3 (L) 11.5 - 16.0 g/dL    HCT 27.7 (L) 35.0 - 47.0 %    MCV 88.8 80.0 - 99.0 FL    MCH 26.6 26.0 - 34.0 PG    MCHC 30.0 30.0 - 36.5 g/dL    RDW 14.7 (H) 11.5 - 14.5 %    PLATELET 100 927 - 663 K/uL    MPV 9.8 8.9 - 12.9 FL    NRBC 0.0 0  WBC    ABSOLUTE NRBC 0.00 0.00 - 0.01 K/uL   MAGNESIUM   Result Value Ref Range    Magnesium 1.7 1.6 - 2.4 mg/dL   METABOLIC PANEL, COMPREHENSIVE   Result Value Ref Range    Sodium 140 136 - 145 mmol/L Potassium 4.5 3.5 - 5.1 mmol/L    Chloride 101 97 - 108 mmol/L    CO2 30 21 - 32 mmol/L    Anion gap 9 5 - 15 mmol/L    Glucose 90 65 - 100 mg/dL    BUN 24 (H) 6 - 20 MG/DL    Creatinine 5.72 (H) 0.55 - 1.02 MG/DL    BUN/Creatinine ratio 4 (L) 12 - 20      GFR est AA 10 (L) >60 ml/min/1.73m2    GFR est non-AA 9 (L) >60 ml/min/1.73m2    Calcium 8.2 (L) 8.5 - 10.1 MG/DL    Bilirubin, total 0.2 0.2 - 1.0 MG/DL    ALT (SGPT) 8 (L) 12 - 78 U/L    AST (SGOT) 17 15 - 37 U/L    Alk. phosphatase 77 45 - 117 U/L    Protein, total 6.1 (L) 6.4 - 8.2 g/dL    Albumin 1.6 (L) 3.5 - 5.0 g/dL    Globulin 4.5 (H) 2.0 - 4.0 g/dL    A-G Ratio 0.4 (L) 1.1 - 2.2     PHOSPHORUS   Result Value Ref Range    Phosphorus 3.5 2.6 - 4.7 MG/DL   CBC W/O DIFF   Result Value Ref Range    WBC 2.5 (L) 3.6 - 11.0 K/uL    RBC 3.15 (L) 3.80 - 5.20 M/uL    HGB 8.4 (L) 11.5 - 16.0 g/dL    HCT 28.0 (L) 35.0 - 47.0 %    MCV 88.9 80.0 - 99.0 FL    MCH 26.7 26.0 - 34.0 PG    MCHC 30.0 30.0 - 36.5 g/dL    RDW 14.6 (H) 11.5 - 14.5 %    PLATELET 712 976 - 677 K/uL    MPV 9.8 8.9 - 12.9 FL    NRBC 0.0 0  WBC    ABSOLUTE NRBC 0.00 0.00 - 0.01 K/uL   MAGNESIUM   Result Value Ref Range    Magnesium 1.9 1.6 - 2.4 mg/dL   PHOSPHORUS   Result Value Ref Range    Phosphorus 2.9 2.6 - 4.7 MG/DL   METABOLIC PANEL, COMPREHENSIVE   Result Value Ref Range    Sodium 140 136 - 145 mmol/L    Potassium 3.9 3.5 - 5.1 mmol/L    Chloride 103 97 - 108 mmol/L    CO2 29 21 - 32 mmol/L    Anion gap 8 5 - 15 mmol/L    Glucose 84 65 - 100 mg/dL    BUN 20 6 - 20 MG/DL    Creatinine 4.12 (H) 0.55 - 1.02 MG/DL    BUN/Creatinine ratio 5 (L) 12 - 20      GFR est AA 15 (L) >60 ml/min/1.73m2    GFR est non-AA 13 (L) >60 ml/min/1.73m2    Calcium 8.1 (L) 8.5 - 10.1 MG/DL    Bilirubin, total 0.2 0.2 - 1.0 MG/DL    ALT (SGPT) 7 (L) 12 - 78 U/L    AST (SGOT) 17 15 - 37 U/L    Alk.  phosphatase 78 45 - 117 U/L    Protein, total 5.9 (L) 6.4 - 8.2 g/dL    Albumin 1.6 (L) 3.5 - 5.0 g/dL Globulin 4.3 (H) 2.0 - 4.0 g/dL    A-G Ratio 0.4 (L) 1.1 - 2.2         Orders Placed This Encounter    oxyCODONE IR (ROXICODONE) 5 mg immediate release tablet     Sig: Take 1 tab in AM and HALF to 1 tab in PM if needed on days where back pain is severe      Dispense:  40 Tab     Refill:  0       1. Chronic bilateral low back pain without sciatica        Follow-up Disposition:  Return in about 1 month (around 7/5/2018).    Chronic back pain. She is to keep her current Oxycodone dosing as this was last filled on 03/05/2018. Mouth swab for medication was evaluated last visit. She is still getting dialysis. She still has a drain in place post surgery. She will call with any changes.  Keep Elavil     This note will not be viewable in [facility]

## 2018-06-06 ENCOUNTER — HOME CARE VISIT (OUTPATIENT)
Dept: SCHEDULING | Facility: HOME HEALTH | Age: 33
End: 2018-06-06
Payer: MEDICARE

## 2018-06-06 ENCOUNTER — HOME CARE VISIT (OUTPATIENT)
Dept: HOME HEALTH SERVICES | Facility: HOME HEALTH | Age: 33
End: 2018-06-06
Payer: MEDICARE

## 2018-06-06 PROCEDURE — 3331090002 HH PPS REVENUE DEBIT

## 2018-06-06 PROCEDURE — 3331090001 HH PPS REVENUE CREDIT

## 2018-06-06 PROCEDURE — G0152 HHCP-SERV OF OT,EA 15 MIN: HCPCS

## 2018-06-07 VITALS
SYSTOLIC BLOOD PRESSURE: 140 MMHG | OXYGEN SATURATION: 96 % | HEART RATE: 85 BPM | RESPIRATION RATE: 16 BRPM | DIASTOLIC BLOOD PRESSURE: 100 MMHG

## 2018-06-07 PROCEDURE — 3331090002 HH PPS REVENUE DEBIT

## 2018-06-07 PROCEDURE — 3331090001 HH PPS REVENUE CREDIT

## 2018-06-08 ENCOUNTER — HOME CARE VISIT (OUTPATIENT)
Dept: SCHEDULING | Facility: HOME HEALTH | Age: 33
End: 2018-06-08
Payer: MEDICARE

## 2018-06-08 PROCEDURE — A4223 INFUSION SUPPLIES W/O PUMP: HCPCS

## 2018-06-08 PROCEDURE — A6258 TRANSPARENT FILM >16<=48 IN: HCPCS

## 2018-06-08 PROCEDURE — A4452 WATERPROOF TAPE: HCPCS

## 2018-06-08 PROCEDURE — A4216 STERILE WATER/SALINE, 10 ML: HCPCS

## 2018-06-08 PROCEDURE — 3331090001 HH PPS REVENUE CREDIT

## 2018-06-08 PROCEDURE — A6402 STERILE GAUZE <= 16 SQ IN: HCPCS

## 2018-06-08 PROCEDURE — A6216 NON-STERILE GAUZE<=16 SQ IN: HCPCS

## 2018-06-08 PROCEDURE — 3331090002 HH PPS REVENUE DEBIT

## 2018-06-08 PROCEDURE — G0300 HHS/HOSPICE OF LPN EA 15 MIN: HCPCS

## 2018-06-09 PROCEDURE — 3331090002 HH PPS REVENUE DEBIT

## 2018-06-09 PROCEDURE — 3331090001 HH PPS REVENUE CREDIT

## 2018-06-10 PROCEDURE — 3331090002 HH PPS REVENUE DEBIT

## 2018-06-10 PROCEDURE — 3331090001 HH PPS REVENUE CREDIT

## 2018-06-11 ENCOUNTER — HOME CARE VISIT (OUTPATIENT)
Dept: SCHEDULING | Facility: HOME HEALTH | Age: 33
End: 2018-06-11
Payer: MEDICARE

## 2018-06-11 VITALS
RESPIRATION RATE: 18 BRPM | DIASTOLIC BLOOD PRESSURE: 100 MMHG | SYSTOLIC BLOOD PRESSURE: 158 MMHG | DIASTOLIC BLOOD PRESSURE: 100 MMHG | SYSTOLIC BLOOD PRESSURE: 152 MMHG | TEMPERATURE: 98.1 F | TEMPERATURE: 98 F | HEART RATE: 100 BPM

## 2018-06-11 PROCEDURE — G0300 HHS/HOSPICE OF LPN EA 15 MIN: HCPCS

## 2018-06-11 PROCEDURE — 3331090002 HH PPS REVENUE DEBIT

## 2018-06-11 PROCEDURE — 3331090001 HH PPS REVENUE CREDIT

## 2018-06-12 ENCOUNTER — HOME CARE VISIT (OUTPATIENT)
Dept: HOME HEALTH SERVICES | Facility: HOME HEALTH | Age: 33
End: 2018-06-12
Payer: MEDICARE

## 2018-06-12 ENCOUNTER — OFFICE VISIT (OUTPATIENT)
Dept: SURGERY | Age: 33
End: 2018-06-12

## 2018-06-12 VITALS
SYSTOLIC BLOOD PRESSURE: 140 MMHG | WEIGHT: 138 LBS | HEART RATE: 98 BPM | TEMPERATURE: 98.2 F | DIASTOLIC BLOOD PRESSURE: 94 MMHG | OXYGEN SATURATION: 98 % | HEIGHT: 65 IN | RESPIRATION RATE: 18 BRPM | BODY MASS INDEX: 22.99 KG/M2

## 2018-06-12 VITALS
TEMPERATURE: 98.9 F | SYSTOLIC BLOOD PRESSURE: 124 MMHG | DIASTOLIC BLOOD PRESSURE: 94 MMHG | RESPIRATION RATE: 18 BRPM | HEART RATE: 100 BPM

## 2018-06-12 DIAGNOSIS — K65.1 INTRA-ABDOMINAL ABSCESS (HCC): Primary | ICD-10-CM

## 2018-06-12 PROCEDURE — 3331090002 HH PPS REVENUE DEBIT

## 2018-06-12 PROCEDURE — 3331090001 HH PPS REVENUE CREDIT

## 2018-06-12 NOTE — PROGRESS NOTES
1. Have you been to the ER, urgent care clinic since your last visit? Hospitalized since your last visit? No    2. Have you seen or consulted any other health care providers outside of the [address] [place] [address] since your last visit? Include any pap smears or colon screening. No     Patient has drain in place and states she is getting 30-50 cc per day.

## 2018-06-13 ENCOUNTER — HOME CARE VISIT (OUTPATIENT)
Dept: HOME HEALTH SERVICES | Facility: HOME HEALTH | Age: 33
End: 2018-06-13
Payer: MEDICARE

## 2018-06-13 PROCEDURE — 3331090002 HH PPS REVENUE DEBIT

## 2018-06-13 PROCEDURE — 3331090001 HH PPS REVENUE CREDIT

## 2018-06-14 PROCEDURE — 3331090001 HH PPS REVENUE CREDIT

## 2018-06-14 PROCEDURE — 3331090002 HH PPS REVENUE DEBIT

## 2018-06-15 PROCEDURE — 3331090001 HH PPS REVENUE CREDIT

## 2018-06-15 PROCEDURE — 3331090002 HH PPS REVENUE DEBIT

## 2018-06-16 ENCOUNTER — HOSPITAL ENCOUNTER (INPATIENT)
Age: 33
LOS: 11 days | Discharge: HOME HEALTH | DRG: 853 | End: 2018-06-28
Attending: EMERGENCY MEDICINE | Admitting: INTERNAL MEDICINE
Payer: MEDICARE

## 2018-06-16 ENCOUNTER — APPOINTMENT (OUTPATIENT)
Dept: GENERAL RADIOLOGY | Age: 33
DRG: 853 | End: 2018-06-16
Attending: EMERGENCY MEDICINE
Payer: MEDICARE

## 2018-06-16 DIAGNOSIS — M54.50 CHRONIC BILATERAL LOW BACK PAIN WITHOUT SCIATICA: ICD-10-CM

## 2018-06-16 DIAGNOSIS — R53.81 PHYSICAL DEBILITY: ICD-10-CM

## 2018-06-16 DIAGNOSIS — E88.09 HYPOALBUMINEMIA: ICD-10-CM

## 2018-06-16 DIAGNOSIS — R11.2 NAUSEA AND VOMITING, INTRACTABILITY OF VOMITING NOT SPECIFIED, UNSPECIFIED VOMITING TYPE: ICD-10-CM

## 2018-06-16 DIAGNOSIS — G89.29 CHRONIC BILATERAL LOW BACK PAIN WITHOUT SCIATICA: ICD-10-CM

## 2018-06-16 DIAGNOSIS — R52 GENERALIZED PAIN: ICD-10-CM

## 2018-06-16 DIAGNOSIS — J18.9 PNEUMONIA OF RIGHT MIDDLE LOBE DUE TO INFECTIOUS ORGANISM: Primary | ICD-10-CM

## 2018-06-16 LAB
ALBUMIN SERPL-MCNC: 2.2 G/DL (ref 3.5–5)
ALBUMIN/GLOB SERPL: 0.4 {RATIO} (ref 1.1–2.2)
ALP SERPL-CCNC: 75 U/L (ref 45–117)
ALT SERPL-CCNC: 10 U/L (ref 12–78)
ANION GAP SERPL CALC-SCNC: 10 MMOL/L (ref 5–15)
AST SERPL-CCNC: 60 U/L (ref 15–37)
BASOPHILS # BLD: 0 K/UL (ref 0–0.1)
BASOPHILS NFR BLD: 1 % (ref 0–1)
BILIRUB SERPL-MCNC: 0.3 MG/DL (ref 0.2–1)
BUN SERPL-MCNC: 11 MG/DL (ref 6–20)
BUN/CREAT SERPL: 4 (ref 12–20)
CALCIUM SERPL-MCNC: 7.6 MG/DL (ref 8.5–10.1)
CHLORIDE SERPL-SCNC: 104 MMOL/L (ref 97–108)
CK SERPL-CCNC: 33 U/L (ref 26–192)
CO2 SERPL-SCNC: 26 MMOL/L (ref 21–32)
CREAT SERPL-MCNC: 3.08 MG/DL (ref 0.55–1.02)
DIFFERENTIAL METHOD BLD: ABNORMAL
EOSINOPHIL # BLD: 0 K/UL (ref 0–0.4)
EOSINOPHIL NFR BLD: 1 % (ref 0–7)
ERYTHROCYTE [DISTWIDTH] IN BLOOD BY AUTOMATED COUNT: 17.6 % (ref 11.5–14.5)
GLOBULIN SER CALC-MCNC: 5.1 G/DL (ref 2–4)
GLUCOSE SERPL-MCNC: 76 MG/DL (ref 65–100)
HCT VFR BLD AUTO: 32.7 % (ref 35–47)
HGB BLD-MCNC: 9.2 G/DL (ref 11.5–16)
IMM GRANULOCYTES # BLD: 0 K/UL
IMM GRANULOCYTES NFR BLD AUTO: 0 %
LACTATE SERPL-SCNC: 3.8 MMOL/L (ref 0.4–2)
LYMPHOCYTES # BLD: 1 K/UL (ref 0.8–3.5)
LYMPHOCYTES NFR BLD: 35 % (ref 12–49)
MCH RBC QN AUTO: 25.7 PG (ref 26–34)
MCHC RBC AUTO-ENTMCNC: 28.1 G/DL (ref 30–36.5)
MCV RBC AUTO: 91.3 FL (ref 80–99)
MONOCYTES # BLD: 0.1 K/UL (ref 0–1)
MONOCYTES NFR BLD: 3 % (ref 5–13)
NEUTS SEG # BLD: 1.8 K/UL (ref 1.8–8)
NEUTS SEG NFR BLD: 60 % (ref 32–75)
NRBC # BLD: 0 K/UL (ref 0–0.01)
NRBC BLD-RTO: 0 PER 100 WBC
PLATELET # BLD AUTO: 164 K/UL (ref 150–400)
PMV BLD AUTO: 11 FL (ref 8.9–12.9)
POTASSIUM SERPL-SCNC: 4 MMOL/L (ref 3.5–5.1)
PROT SERPL-MCNC: 7.3 G/DL (ref 6.4–8.2)
RBC # BLD AUTO: 3.58 M/UL (ref 3.8–5.2)
RBC MORPH BLD: ABNORMAL
SODIUM SERPL-SCNC: 140 MMOL/L (ref 136–145)
TROPONIN I SERPL-MCNC: 0.05 NG/ML
WBC # BLD AUTO: 2.9 K/UL (ref 3.6–11)

## 2018-06-16 PROCEDURE — 85025 COMPLETE CBC W/AUTO DIFF WBC: CPT | Performed by: EMERGENCY MEDICINE

## 2018-06-16 PROCEDURE — 74011000258 HC RX REV CODE- 258: Performed by: EMERGENCY MEDICINE

## 2018-06-16 PROCEDURE — 94640 AIRWAY INHALATION TREATMENT: CPT

## 2018-06-16 PROCEDURE — 83605 ASSAY OF LACTIC ACID: CPT | Performed by: EMERGENCY MEDICINE

## 2018-06-16 PROCEDURE — 77030029684 HC NEB SM VOL KT MONA -A

## 2018-06-16 PROCEDURE — 3331090001 HH PPS REVENUE CREDIT

## 2018-06-16 PROCEDURE — 36415 COLL VENOUS BLD VENIPUNCTURE: CPT | Performed by: EMERGENCY MEDICINE

## 2018-06-16 PROCEDURE — 74011250637 HC RX REV CODE- 250/637: Performed by: EMERGENCY MEDICINE

## 2018-06-16 PROCEDURE — 84484 ASSAY OF TROPONIN QUANT: CPT | Performed by: EMERGENCY MEDICINE

## 2018-06-16 PROCEDURE — 96368 THER/DIAG CONCURRENT INF: CPT

## 2018-06-16 PROCEDURE — 80053 COMPREHEN METABOLIC PANEL: CPT | Performed by: EMERGENCY MEDICINE

## 2018-06-16 PROCEDURE — 93005 ELECTROCARDIOGRAM TRACING: CPT

## 2018-06-16 PROCEDURE — 74011000250 HC RX REV CODE- 250: Performed by: EMERGENCY MEDICINE

## 2018-06-16 PROCEDURE — 96365 THER/PROPH/DIAG IV INF INIT: CPT

## 2018-06-16 PROCEDURE — 3331090002 HH PPS REVENUE DEBIT

## 2018-06-16 PROCEDURE — 74011250636 HC RX REV CODE- 250/636: Performed by: EMERGENCY MEDICINE

## 2018-06-16 PROCEDURE — 99284 EMERGENCY DEPT VISIT MOD MDM: CPT

## 2018-06-16 PROCEDURE — 3331090003 HH PPS REVENUE ADJ

## 2018-06-16 PROCEDURE — 71045 X-RAY EXAM CHEST 1 VIEW: CPT

## 2018-06-16 PROCEDURE — 87040 BLOOD CULTURE FOR BACTERIA: CPT | Performed by: EMERGENCY MEDICINE

## 2018-06-16 PROCEDURE — 82550 ASSAY OF CK (CPK): CPT | Performed by: EMERGENCY MEDICINE

## 2018-06-16 RX ORDER — CARVEDILOL 12.5 MG/1
12.5 TABLET ORAL
Status: COMPLETED | OUTPATIENT
Start: 2018-06-16 | End: 2018-06-16

## 2018-06-16 RX ORDER — LEVOFLOXACIN 5 MG/ML
750 INJECTION, SOLUTION INTRAVENOUS
Status: COMPLETED | OUTPATIENT
Start: 2018-06-16 | End: 2018-06-17

## 2018-06-16 RX ORDER — OXYCODONE AND ACETAMINOPHEN 5; 325 MG/1; MG/1
1 TABLET ORAL
Status: COMPLETED | OUTPATIENT
Start: 2018-06-16 | End: 2018-06-16

## 2018-06-16 RX ADMIN — OXYCODONE HYDROCHLORIDE AND ACETAMINOPHEN 1 TABLET: 5; 325 TABLET ORAL at 23:16

## 2018-06-16 RX ADMIN — CARVEDILOL 12.5 MG: 12.5 TABLET, FILM COATED ORAL at 23:49

## 2018-06-16 RX ADMIN — PIPERACILLIN SODIUM AND TAZOBACTAM SODIUM 3.38 G: 3; .375 INJECTION, POWDER, LYOPHILIZED, FOR SOLUTION INTRAVENOUS at 23:05

## 2018-06-16 RX ADMIN — LEVOFLOXACIN 750 MG: 5 INJECTION, SOLUTION INTRAVENOUS at 23:05

## 2018-06-16 RX ADMIN — ALBUTEROL SULFATE 1 DOSE: 2.5 SOLUTION RESPIRATORY (INHALATION) at 21:31

## 2018-06-16 NOTE — IP AVS SNAPSHOT
[facility] [facility] 
[phone] Patient: Dino Esparza MRN: [id number] :1985 About your hospitalization You were admitted on:  2018 You last received care in the:  [address] [id number] You were discharged on:  2018 Why you were hospitalized Your primary diagnosis was:  Sepsis (Hcc) Your diagnoses also included:  Troponin Level Elevated Follow-up Information Follow up With Details Comments Contact Info Sue Reddy MD On 2018 at [facility] for cardiology followup/ Blood pressure evaluation. Please arrive at 1:40PM [address] [place] 
[phone] Sue Reddy MD On 2018 Echocardiogram at 1PM, followed by appointment with Dr. Darrius Perales. Please arrive at 12:40PM [address] [place] 
[phone] [address] [place] [address] Rd. 
1st Floor [facility] [address] 
[phone] Compa Day MD In 2 weeks Call office to schedule appointment with Dr. Bessy Rodriguez for two weeks after drain removal.  [address] 
[address] [facility] [place] [address] 
[phone] Fabricio Willard MD In 1 week Go to your dislysis as previously scheduled. [address] [facility], [address] [place] [address] 
[phone] Alix Sick, NP   [address] [facility] [place] [address] 
[phone] Your Scheduled Appointments 2018  1:00 PM EDT Follow Up with Camille Carmona MD  
[facility]) [place] [address] [facility] [phone] 2018  2:00 PM EDT  
ESTABLISHED PATIENT with Sue Reddy MD  
[facility] ([facility]) [address] [place] [address]  [address] [facility]  
[phone] Discharge Orders None A check fahad indicates which time of day the medication should be taken. My Medications START taking these medications Instructions Each Dose to Equal  
 Morning Noon Evening Bedtime  
 cloNIDine HCl 0.1 mg tablet Commonly known as:  CATAPRES Your last dose was: Your next dose is: Take 1 Tab by mouth three (3) times daily for 30 days. 0.1 mg  
    
   
   
   
  
 HYDROmorphone 2 mg tablet Commonly known as:  DILAUDID Your last dose was: Your next dose is: Take 1 Tab by mouth every six (6) hours as needed for Pain for up to 14 days. Max Daily Amount: 8 mg.  
 2 mg L. acidoph & paracasei- S therm- Bifido 8 billion cell Cap cap Commonly known as:  AJIT-Q/RISAQUAD Start taking on:  6/29/2018 Your last dose was: Your next dose is: Take 1 Cap by mouth daily for 30 days. 1 Cap  
    
   
   
   
  
 losartan 50 mg tablet Commonly known as:  COZAAR Start taking on:  6/29/2018 Your last dose was: Your next dose is: Take 1 Tab by mouth daily for 30 days. 50 mg CHANGE how you take these medications Instructions Each Dose to Equal  
 Morning Noon Evening Bedtime  
 apixaban 2.5 mg tablet Commonly known as:  Caro Joseph What changed:   
- medication strength 
- how much to take - when to take this Your last dose was: Your next dose is: Take 1 Tab by mouth two (2) times a day for 30 days. 2.5 mg  
    
   
   
   
  
 carvedilol 25 mg tablet Commonly known as:  Nghia Leal What changed:   
- medication strength 
- how much to take Your last dose was: Your next dose is: Take 1 Tab by mouth two (2) times daily (with meals) for 30 days. 25 mg  
    
   
   
   
  
 predniSONE 5 mg tablet Commonly known as:  Colby Syed What changed:  how much to take Your last dose was: Your next dose is: Take 2 Tabs by mouth daily. 10 mg CONTINUE taking these medications Instructions Each Dose to Equal  
 Morning Noon Evening Bedtime  
 albuterol 90 mcg/actuation inhaler Commonly known as:  PROVENTIL HFA, VENTOLIN HFA, PROAIR HFA Your last dose was: Your next dose is: Take 1-2 Puffs by inhalation every four (4) hours as needed for Wheezing or Shortness of Breath. 1-2 Puff  
    
   
   
   
  
 allopurinol 100 mg tablet Commonly known as:  Theoplis Spry Your last dose was: Your next dose is: Take 100 mg by mouth daily (after breakfast). Needs to TAKE on a full stomach; will cause her to be nauseated. 100 mg  
    
   
   
   
  
 amitriptyline 25 mg tablet Commonly known as:  ELAVIL Your last dose was: Your next dose is: Take 25 mg by mouth nightly as needed for Sleep. 25 mg  
    
   
   
   
  
 amLODIPine 5 mg tablet Commonly known as:  Michelle Morfin Your last dose was: Your next dose is: Take 2 Tabs by mouth daily for 30 days. 10 mg  
    
   
   
   
  
 azaTHIOprine 50 mg tablet Commonly known as:  The Pepsi Your last dose was: Your next dose is: Take 50 mg by mouth daily (after breakfast). 50 mg  
    
   
   
   
  
 cyanocobalamin 2,500 mcg sublingual tablet Commonly known as:  VITAMIN B-12 Your last dose was: Your next dose is: Take 1 Tab by mouth daily. 2500 mcg  
    
   
   
   
  
 fluticasone-vilanterol 200-25 mcg/dose inhaler Commonly known as:  BREO ELLIPTA Your last dose was: Your next dose is: Take 1 Puff by inhalation daily. Rinse mouth out after use 1 Puff  
    
   
   
   
  
 gemfibrozil 600 mg tablet Commonly known as:  LOPID Your last dose was: Your next dose is: Take 300 mg by mouth daily. 300 mg  
    
   
   
   
  
 oxyCODONE IR 5 mg immediate release tablet Commonly known as:  Twylldee Stanford Your last dose was: Your next dose is: Take 1 tab in AM and HALF to 1 tab in PM if needed on days where back pain is severe  
     
   
   
   
  
 pantoprazole 40 mg tablet Commonly known as:  PROTONIX Your last dose was: Your next dose is: Take 1 Tab by mouth Daily (before breakfast). 40 mg  
    
   
   
   
  
 PLAQUENIL 200 mg tablet Generic drug:  hydroxychloroquine Your last dose was: Your next dose is: Take 200 mg by mouth two (2) times a day. 200 mg  
    
   
   
   
  
 polyethylene glycol 17 gram packet Commonly known as:  Ferol Cresencio Your last dose was: Your next dose is: Take 1 Packet by mouth daily. 17 g Senna 8.6 mg tablet Generic drug:  senna Your last dose was: Your next dose is: Take 1 Tab by mouth daily as needed for Constipation. for constipation 1 Tab STOP taking these medications   
 amoxicillin 500 mg Tab Where to Get Your Medications Information on where to get these meds will be given to you by the nurse or doctor. ! Ask your nurse or doctor about these medications  
  amLODIPine 5 mg tablet  
 apixaban 2.5 mg tablet  
 carvedilol 25 mg tablet  
 cloNIDine HCl 0.1 mg tablet HYDROmorphone 2 mg tablet L. acidoph & paracasei- S therm- Bifido 8 billion cell Cap cap  
 losartan 50 mg tablet  
 predniSONE 5 mg tablet Opioid Education Prescription Opioids: What You Need to Know: 
 
 
You will have two or three small incisions. These incisions are sealed with sutures that dissolve. The lower incision is from the chest tube. This lower incision will seal itself in 5 to 7 days. Until then you may have drainage from that incision. The drainage will be thin yellowish or red in color and may drain for 5 to 7 days. This is normal and expected. INCISION CARE: 
 
Wash incisions daily with soap. Pat dry. Showers only, no tub baths. If you have drainage, you can place a bandage over the area, otherwise keep open to air. You may experience drainage of clear-strawberry colored fluid from the chest tube site. This is to be expected and will stop in 24-48 hours. PAIN: 
 
What you will experience: ? Tenderness and soreness around incisions ? Burning and/or numbness ? Soreness around front/back of chest 
 
For relief of discomfort: ? Take the pain medicine you were given at discharge ? Aleve one or two tablets every 12 hrs (with food) along with pain medicine (this can be purchased over the counter at your local pharmacy/drug store). Advil may be substituted. Start this after you finish taking Toradol if prescribed. ? Heating pad 10 minutes at a time to the upper incision area as often as you wish. ? For the Ladies: Spandex or elastic sports bra will give you the support you need without pressure on the incision. Most will find this to be a great comfort. COUGHING: 
 
Coughing is helpful after lung surgery. Place a pillow over your incision and apply pressure when coughing to reduce pain. ACTIVITY: 
 
DO: 1. Walk daily outside if weather permits, at a mall if not. Increase your distance each day. Exercise has many benefits. It promotes healing, expands your lungs,  
             helps you cough, relaxes your body, tones muscles, lowers blood pressure and  
            improves your appetite. After you exercise expect to feel tired and probably short  
             of breath. Plan to take a nap 1-2 times a day to regain your strength . 2. Climb stairs 3. Ride in a car 4. Perform breathing exercises (incentive spirometer) DO NOT: 
            
1. Lift heavy objects (8-10 pounds) 2. Drive a car/truck until after your post-op visit 3. Do heavy yard or housework APPETITE: It is usual to have a decreased appetite after surgery. Exercise is the best stimulant. You may expect to slowly improve over 4-10 days. CALL THE OFFICE IF YOU DEVELOP: 
 
? Increasing pain that is not controlled by the pain medicine. ? Increasing redness or drainage around the incision. ? Unusual or increasing shortness of breath ? Fever greater than 101 degrees ? Change in the color of your sputum to yellow or green, especially if you also have increasing shortness of breath and a fever greater than 101. YOUR MEDICATIONS: 
As instructed FOLLOW-UP APPOINTMENT: 
AFTER DISCHARGE CALL THE OFFICE TO SCHEDULE YOUR VISIT TO SEE US IN 2 WEEKS. Please arrive 45 minutes prior to you appointment time in order to have a chest X-Ray. Check in at [address] [facility] on the ground floor of the [facility]. After your X-ray come to the [address] [place] [address] for your appointment. Physician Signature Discharge Instructions PATIENT ID: Richy Prieto MRN: [id number] YOB: 1985 DATE OF ADMISSION: 6/16/2018  8:25 PM   
DATE OF DISCHARGE: 6/28/2018 PRIMARY CARE PROVIDER: Farrah Dominguez NP  
 
ATTENDING PHYSICIAN: Bree Garner MD 
DISCHARGING PROVIDER: Bree Garner MD   
 To contact this individual call [phone] and ask the  to page. If unavailable ask to be transferred the Adult Hospitalist Department. DISCHARGE DIAGNOSES and ADDITIONAL CARE RECOMMENDATIONS:  
 
Health care associated pneumonia with partially loculated pleural effusion,recurrent. You underwent video assisted thoracostomy and drainage of the fluid. You have completed intravenous antibiotics course in the hospital.Infectious disease and Thoracic surgery team were involved and all agreed you can go home. Watch for the following symptoms: shortness of breath,cough,fever,chest pain and if any of these symptoms occur,go to your doctor or call [phone]. 
  
Abdominal cavity abscess (last admission) -The cat scan on 6/17 showed the pelvic fluid has diminished significantly. We consulted the surgeons and recommend removal of the pigtail catheter,thus the catheter is taken out on 6/28. Please follow up with Dr Macario Webb for check up. 
  
  
End stage renal disease,on hemodialysis. -Go to your dialysis as before Tuesday,Thursday and Saturdays. 
  
Anemia,you have received 3 units of blood. Ask the dialysis center to check your hemoglobin when you go there Saturday 
  
Lupus: continue your medications which are unchanged. Follow up with your Rheumatologist,Dr Muna Bui. 
  
Chronic DVT: continue the prophylactic Eliquis 
  
Hypertension,your blood pressure was high and we have made changes to your blood pressure medications  
-See medication and dosage from the after visit summary. CONSULTATIONS: IP CONSULT TO NEPHROLOGY 
IP CONSULT TO THORACIC SURGERY 
IP CONSULT TO INFECTIOUS DISEASES 
IP CONSULT TO INFECTIOUS DISEASES 
IP CONSULT TO INTERVENTIONAL RADIOLOGY 
IP CONSULT TO CARDIOLOGY 
IP CONSULT TO GENERAL SURGERY 
IP CONSULT TO GENERAL SURGERY 
 
PROCEDURES/SURGERIES: Procedure(s): RIGHT VIDEO ASSITED THORASCOPY, DECORTICATION PENDING TEST RESULTS:  
 At the time of discharge the following test results are still pending: none FOLLOW UP APPOINTMENTS:  
Follow-up Information Follow up With Details Comments Contact Info Chavo Berg MD On 6/18/2018 at [facility] for cardiology followup/ Blood pressure evaluation. Please arrive at 1:40PM [address] [place] [address] 
[phone] Chavo Berg MD On 12/21/2018 Echocardiogram at 1PM, followed by appointment with Dr. Easton Bishop. Please arrive at 12:40PM [address] [facility] 57 
[phone] [address] [place] [address]   [address] 
1st Floor [facility] [address] 
[phone] González Cruz MD In 2 weeks Call office to schedule appointment with Dr. Edwin Valdes for two weeks after drain removal.  [address] 
[address] [place] [address] 
[phone] Fabricio Wren MD In 1 week Go to your dislysis as previously scheduled. [address] [place] [address] [address] [place] [address] 
[phone] DIET: Renal Diet ACTIVITY: Activity as tolerated WOUND CARE: NA 
 
EQUIPMENT needed: own DISCHARGE MEDICATIONS: 
 See Medication Reconciliation Form · It is important that you take the medication exactly as they are prescribed. · Keep your medication in the bottles provided by the pharmacist and keep a list of the medication names, dosages, and times to be taken in your wallet. · Do not take other medications without consulting your doctor. NOTIFY YOUR PHYSICIAN FOR ANY OF THE FOLLOWING:  
Fever over 101 degrees for 24 hours. Chest pain, shortness of breath, fever, chills, nausea, vomiting, diarrhea, change in mentation, falling, weakness, bleeding. Severe pain or pain not relieved by medications. Or, any other signs or symptoms that you may have questions about. DISPOSITION: 
x  Home With: 
 OT  PT  [place]  RN  
  
 SNF/Inpatient Rehab/LTAC Independent/assisted living Hospice Other:  
 
 
Signed:   
[address] O[address] Fleming MD 
 6/28/2018 
3:16 PM 
 
 
ACO Transitions of Care Introducing [facility] offers a voluntary care coordination program to provide high quality service and care to [facility] fee-for-service beneficiaries. Molly Cockayne was designed to help you enhance your health and well-being through the following services: ? Transitions of Care  support for individuals who are transitioning from one care setting to another (example: Hospital to home). ? Chronic and Complex Care Coordination  support for individuals and caregivers of those with serious or chronic illnesses or with more than one chronic (ongoing) condition and those who take a number of different medications. If you meet specific medical criteria, a [address] [place] [address] Rd may call you directly to coordinate your care with your primary care physician and your other care providers. For questions about the [facility] programs, please, contact your physicians office. For general questions or additional information about Accountable Care Organizations: 
Please visit www.medicare.gov/acos. html or call 1-800-MEDICARE (5-207.404.5123) TTY users should call 5-686.632.2939. The Online 401 Announcement We are excited to announce that we are making your provider's discharge notes available to you in The Online 401. You will see these notes when they are completed and signed by the physician that discharged you from your recent hospital stay. If you have any questions or concerns about any information you see in [employer], please call the Health Information Department where you were seen or reach out to your Primary Care Provider for more information about your plan of care. Introducing [facility] HE[facility]! [employer] introduces The Online 401 patient portal. Now you can access parts of your medical record, email your doctor's office, and request medication refills online. 1. In your internet browser, go to [email or web address] 2. Click on the First Time User? Click Here link in the Sign In box. You will see the New Member Sign Up page. 3. Enter your Thumb Friendly Access Code exactly as it appears below. You will not need to use this code after youve completed the sign-up process. If you do not sign up before the expiration date, you must request a new code. · Thumb Friendly Access Code: [email or web address] Expires: 9/1/2018  9:07 PM 
 
4. Enter the last four digits of your Social Security Number (xxxx) and Date of Birth (mm/dd/yyyy) as indicated and click Submit. You will be taken to the next sign-up page. 5. Create a Tango Healtht ID. This will be your Thumb Friendly login ID and cannot be changed, so think of one that is secure and easy to remember. 6. Create a Thumb Friendly password. You can change your password at any time. 7. Enter your Password Reset Question and Answer. This can be used at a later time if you forget your password. 8. Enter your e-mail address. You will receive e-mail notification when new information is available in [address] 9. Click Sign Up. You can now view and download portions of your medical record. 10. Click the Download Summary menu link to download a portable copy of your medical information. If you have questions, please visit the Frequently Asked Questions section of the Thumb Friendly website. Remember, Thumb Friendly is NOT to be used for urgent needs. For medical emergencies, dial 911. Now available from your iPhone and Android! Introducing Nito Major As a [facility] patient, I wanted to make you aware of our electronic visit tool called Nito Major. [facility] 24/7 allows you to connect within minutes with a medical provider 24 hours a day, seven days a week via a mobile device or tablet or logging into a secure website from your computer. You can access Nito Major from anywhere in the [place]. A virtual visit might be right for you when you have a simple condition and feel like you just dont want to get out of bed, or cant get away from work for an appointment, when your regular [facility] [employer] provider is not available (evenings, weekends or holidays), or when youre out of town and need minor care. Electronic visits cost only $49 and if the [employer] 24/7 provider determines a prescription is needed to treat your condition, one can be electronically transmitted to a nearby pharmacy*. Please take a moment to enroll today if you have not already done so. The enrollment process is free and takes just a few minutes. To enroll, please download the Ren Philly 24/BrightEdge judith to your tablet or phone, or visit [email or web address] to enroll on your computer. And, as an [address] [place] [address] patient with a AmpIdea account, the results of your visits will be scanned into your electronic medical record and your primary care provider will be able to view the scanned results. We urge you to continue to see your regular [facility] provider for your ongoing medical care. And while your primary care provider may not be the one available when you seek a Nito Major virtual visit, the peace of mind you get from getting a real diagnosis real time can be priceless. For more information on Nito Major, view our Frequently Asked Questions (FAQs) at [email or web address]. Sincerely, 
 
Brittaney Escobedo MD 
Chief Medical Officer [address] Krishnamurthy *:  certain medications cannot be prescribed via [facility]ito [facility] [employer] Unresulted Labs-Please follow up with your PCP about these lab tests Order Current Status AFB CULTURE + SMEAR W/RFLX ID FROM CULTURE Preliminary result CULTURE, FUNGUS Preliminary result Providers Seen During Your Hospitalization Provider Specialty Primary office phone Jacqui Faith MD Emergency Medicine [phone] Mann Ding MD Internal Medicine [phone] Maralyn Prader, MD Internal Medicine [phone] Neftaly Stanton MD Internal Medicine [phone] Isabel Patel MD Internal Medicine [phone] Von Hebert MD Internal Medicine [phone] Kody Van MD Internal Medicine [phone] Your Primary Care Physician (PCP) Primary Care Physician Office Phone Office Fax Saran Llamas [phone] You are allergic to the following Allergen Reactions Compazine (Prochlorperazine Edisylate) Nausea and Vomiting Palpitations Recent Documentation Height Weight BMI OB Status Smoking Status 1.651 m 70.1 kg 25.72 kg/m2 Medically Induced Never Smoker Emergency Contacts Name Discharge Info Relation Home Work Mobile Louis Sparks CAREGIVER [3] Mother [14] [phone] Stew Lockhart CAREGIVER [3] Father [15] [phone] Patient Belongings The following personal items are in your possession at time of discharge: 
  Dental Appliances: None  Visual Aid: None      Home Medications: None   Jewelry: Bracelet, With patient  Clothing: Shorts, Footwear, Pants, At bedside    Other Valuables: [employer], Purse, At bedside Please provide this summary of care documentation to your next provider. Signatures-by signing, you are acknowledging that this After Visit Summary has been reviewed with you and you have received a copy. Patient Signature:  ____________________________________________________________ Date:  ____________________________________________________________  
  
Jesse Julio Provider Signature:  ____________________________________________________________ Date:  ____________________________________________________________

## 2018-06-16 NOTE — IP AVS SNAPSHOT
[address] 
[phone] Patient: Deb Hearn MRN: [id number] :1985 A check fahad indicates which time of day the medication should be taken. My Medications START taking these medications Instructions Each Dose to Equal  
 Morning Noon Evening Bedtime  
 cloNIDine HCl 0.1 mg tablet Commonly known as:  CATAPRES Your last dose was: Your next dose is: Take 1 Tab by mouth three (3) times daily for 30 days. 0.1 mg  
    
   
   
   
  
 HYDROmorphone 2 mg tablet Commonly known as:  DILAUDID Your last dose was: Your next dose is: Take 1 Tab by mouth every six (6) hours as needed for Pain for up to 14 days. Max Daily Amount: 8 mg.  
 2 mg L. acidoph & paracasei- S therm- Bifido 8 billion cell Cap cap Commonly known as:  AJIT-Q/RISAQUAD Start taking on:  2018 Your last dose was: Your next dose is: Take 1 Cap by mouth daily for 30 days. 1 Cap  
    
   
   
   
  
 losartan 50 mg tablet Commonly known as:  COZAAR Start taking on:  2018 Your last dose was: Your next dose is: Take 1 Tab by mouth daily for 30 days. 50 mg CHANGE how you take these medications Instructions Each Dose to Equal  
 Morning Noon Evening Bedtime  
 apixaban 2.5 mg tablet Commonly known as:  Obadiah Severe What changed:   
- medication strength 
- how much to take - when to take this Your last dose was: Your next dose is: Take 1 Tab by mouth two (2) times a day for 30 days. 2.5 mg  
    
   
   
   
  
 carvedilol 25 mg tablet Commonly known as:  Any Maxwell What changed:   
- medication strength 
- how much to take Your last dose was: Your next dose is: Take 1 Tab by mouth two (2) times daily (with meals) for 30 days. 25 mg  
    
   
   
   
  
 predniSONE 5 mg tablet Commonly known as:  Josh Lyrics What changed:  how much to take Your last dose was: Your next dose is: Take 2 Tabs by mouth daily. 10 mg CONTINUE taking these medications Instructions Each Dose to Equal  
 Morning Noon Evening Bedtime  
 albuterol 90 mcg/actuation inhaler Commonly known as:  PROVENTIL HFA, VENTOLIN HFA, PROAIR HFA Your last dose was: Your next dose is: Take 1-2 Puffs by inhalation every four (4) hours as needed for Wheezing or Shortness of Breath. 1-2 Puff  
    
   
   
   
  
 allopurinol 100 mg tablet Commonly known as:  Vy Pederson Your last dose was: Your next dose is: Take 100 mg by mouth daily (after breakfast). Needs to TAKE on a full stomach; will cause her to be nauseated. 100 mg  
    
   
   
   
  
 amitriptyline 25 mg tablet Commonly known as:  ELAVIL Your last dose was: Your next dose is: Take 25 mg by mouth nightly as needed for Sleep. 25 mg  
    
   
   
   
  
 amLODIPine 5 mg tablet Commonly known as:  Davidra McKenzie Your last dose was: Your next dose is: Take 2 Tabs by mouth daily for 30 days. 10 mg  
    
   
   
   
  
 azaTHIOprine 50 mg tablet Commonly known as:  [employer] Your last dose was: Your next dose is: Take 50 mg by mouth daily (after breakfast). 50 mg  
    
   
   
   
  
 cyanocobalamin 2,500 mcg sublingual tablet Commonly known as:  VITAMIN B-12 Your last dose was: Your next dose is: Take 1 Tab by mouth daily. 2500 mcg  
    
   
   
   
  
 fluticasone-vilanterol 200-25 mcg/dose inhaler Commonly known as:  MASHA ELLIPTA Your last dose was: Your next dose is: Take 1 Puff by inhalation daily. Rinse mouth out after use 1 Puff  
    
   
   
   
  
 gemfibrozil 600 mg tablet Commonly known as:  LOPID Your last dose was: Your next dose is: Take 300 mg by mouth daily. 300 mg  
    
   
   
   
  
 oxyCODONE IR 5 mg immediate release tablet Commonly known as:  Denise Sanchez Your last dose was: Your next dose is: Take 1 tab in AM and HALF to 1 tab in PM if needed on days where back pain is severe  
     
   
   
   
  
 pantoprazole 40 mg tablet Commonly known as:  PROTONIX Your last dose was: Your next dose is: Take 1 Tab by mouth Daily (before breakfast). 40 mg  
    
   
   
   
  
 PLAQUENIL 200 mg tablet Generic drug:  hydroxychloroquine Your last dose was: Your next dose is: Take 200 mg by mouth two (2) times a day. 200 mg  
    
   
   
   
  
 polyethylene glycol 17 gram packet Commonly known as:  Orysia [place] Your last dose was: Your next dose is: Take 1 Packet by mouth daily. 17 g Senna 8.6 mg tablet Generic drug:  senna Your last dose was: Your next dose is: Take 1 Tab by mouth daily as needed for Constipation. for constipation 1 Tab STOP taking these medications   
 amoxicillin 500 mg Tab Where to Get Your Medications Information on where to get these meds will be given to you by the nurse or doctor. ! Ask your nurse or doctor about these medications  
  amLODIPine 5 mg tablet  
 apixaban 2.5 mg tablet  
 carvedilol 25 mg tablet  
 cloNIDine HCl 0.1 mg tablet HYDROmorphone 2 mg tablet L. acidoph & paracasei- S therm- Bifido 8 billion cell Cap cap  
 losartan 50 mg tablet  
 predniSONE 5 mg tablet

## 2018-06-16 NOTE — IP AVS SNAPSHOT
Summary of Care Report The Summary of Care report has been created to help improve care coordination. Users with access to [employer] or [address] (Web-based application) may access additional patient information including the Discharge Summary. If you are not currently a [address] user and need more information, please call the number listed below in the [place] [address] section and ask to be connected with Medical Records. Facility Information Name Address Phone [address] [facility] 7 [phone] Patient Information Patient Name Sex  Kat Koroma ([id number]) Female 1985 Discharge Information Admitting Provider Service Area Unit Citlali Leonardo MD / [address] [facility] Surgical Unit / [phone] Discharge Provider Discharge Date/Time Discharge Disposition Destination (none) 2018 Afternoon (Pending) AHR (none) Patient Language Language ENGLISH [13] Hospital Problems as of 2018  Reviewed: 2018  7:26 AM by Neha Sanchez CRNA Class Noted - Resolved Last Modified POA Active Problems * (Principal)Sepsis ([facility] [address]  2018 - Present 2018 by Citlali Leonardo MD Yes Entered by Enrique Jj MD  
  Troponin level elevated  2018 - Present 2018 by Christian Carbajal MD Unknown Entered by Christian Carbajal MD  
  
Non-Hospital Problems as of 2018  Reviewed: 2018  7:26 AM by Neha Sanchez CRNA Class Noted - Resolved Last Modified Active Problems Lupus  2012 - Present 10/14/2017 by Jourdan Renee MD  
  Entered by Eris Hernandez MD  
  Lupus nephritis ([facility] [address]  3/10/2012 - Present 3/11/2012 Entered by Cassie Montoya   Encounter for monitoring opioid maintenance therapy  11/3/2015 - Present 11/3/2015 by Pradeep Tafoya MD  
  Entered by Pradeep Tafoya MD  
  Malignant hypertension  7/11/2016 - Present 7/11/2016 by Kirsten Lundy MD  
  Entered by Kirsten Lundy MD  
  ESRD on peritoneal dialysis [facility]) (Chronic)  10/7/2016 - Present 10/14/2017 by Valentino Frost, MD  
  Entered by Governor Vivek, NP Anemia of renal disease  2/21/2017 - Present 10/14/2017 by Valentino Frost, MD  
  Entered by Governor Doyle, NP Dependence on peritoneal dialysis ([facility] [address]  2/21/2017 - Present 2/21/2017 by Governor Vivek, NP Entered by Governjeniffer Doyle, NP  
  ACP (advance care planning)  2/21/2017 - Present 2/21/2017 by Governjeniffer Doyle, NP Entered by Governor Vivek, NP Chronic bilateral low back pain without sciatica  5/26/2017 - Present 5/26/2017 by Pradeep Tafoya MD  
  Entered by Pradeep Tafoya MD  
  Hypertension (Chronic)  10/14/2017 - Present 10/14/2017 by Valentino Frost, MD  
  Entered by Valentino Frost, MD  
  Hypokalemia  10/27/2017 - Present 11/27/2017 by Valentino Frost, MD  
  Entered by Kem Staley MD  
  Hypomagnesemia  10/30/2017 - Present 10/30/2017 by Tesfaye Cadena, NP Entered by Tesfaye Cadena, NP Hypocalcemia  10/30/2017 - Present 10/30/2017 by Tesfaye Cadena, NP Entered by Tesfaye Cadena NP History of pulmonary embolism  12/8/2017 - Present 12/8/2017 by Governjeniffer Doyle, NP Entered by Governor Doyle, NP Peritonitis ([facility] [address]  3/11/2018 - Present 3/11/2018 by Tiny Elise MD  
  Entered by Tiny Elise MD  
  Generalized abdominal pain  4/4/2018 - Present 4/4/2018 by Mayte Palm MD  
  Entered by Mayte Palm MD  
  Other constipation  4/4/2018 - Present 4/4/2018 by Mayte Palm MD  
  Entered by Mayte Palm MD  
  Other ascites  4/4/2018 - Present 4/4/2018 by Mayte Palm MD  
  Entered by Mayte Palm MD  
  
You are allergic to the following Allergen Reactions Compazine (Prochlorperazine Edisylate) Nausea and Vomiting Palpitations Current Discharge Medication List  
  
START taking these medications Dose & Instructions Dispensing Information Comments  
 cloNIDine HCl 0.1 mg tablet Commonly known as:  CATAPRES Dose:  0.1 mg Take 1 Tab by mouth three (3) times daily for 30 days. Quantity:  90 Tab Refills:  0 HYDROmorphone 2 mg tablet Commonly known as:  DILAUDID Dose:  2 mg Take 1 Tab by mouth every six (6) hours as needed for Pain for up to 14 days. Max Daily Amount: 8 mg. Quantity:  14 Tab Refills:  0  
   
 L. acidoph & paracasei- S therm- Bifido 8 billion cell Cap cap Commonly known as:  AJIT-Q/RISAQUAD Start taking on:  6/29/2018 Dose:  1 Cap Take 1 Cap by mouth daily for 30 days. Quantity:  30 Cap Refills:  0  
   
 losartan 50 mg tablet Commonly known as:  COZAAR Start taking on:  6/29/2018 Dose:  50 mg Take 1 Tab by mouth daily for 30 days. Quantity:  30 Tab Refills:  0 CONTINUE these medications which have CHANGED Dose & Instructions Dispensing Information Comments  
 apixaban 2.5 mg tablet Commonly known as:  Jossy Bloodsaima What changed:   
- medication strength 
- how much to take - when to take this Dose:  2.5 mg Take 1 Tab by mouth two (2) times a day for 30 days. Quantity:  60 Tab Refills:  0  
   
 carvedilol 25 mg tablet Commonly known as:  Steven Vasquez What changed:   
- medication strength 
- how much to take Dose:  25 mg Take 1 Tab by mouth two (2) times daily (with meals) for 30 days. Quantity:  60 Tab Refills:  0  
   
 predniSONE 5 mg tablet Commonly known as:  Valerio Rosales What changed:  how much to take Dose:  10 mg Take 2 Tabs by mouth daily. Quantity:  30 Tab Refills:  0 CONTINUE these medications which have NOT CHANGED Dose & Instructions Dispensing Information Comments  
 albuterol 90 mcg/actuation inhaler Commonly known as:  PROVENTIL HFA, VENTOLIN HFA, PROAIR HFA Dose:  1-2 Puff Take 1-2 Puffs by inhalation every four (4) hours as needed for Wheezing or Shortness of Breath. Quantity:  1 Inhaler Refills:  2  
   
 allopurinol 100 mg tablet Commonly known as:  Vy Dacia Dose:  100 mg Take 100 mg by mouth daily (after breakfast). Needs to TAKE on a full stomach; will cause her to be nauseated. Refills:  0  
   
 amitriptyline 25 mg tablet Commonly known as:  ELAVIL Dose:  25 mg Take 25 mg by mouth nightly as needed for Sleep. Refills:  0  
   
 amLODIPine 5 mg tablet Commonly known as:  Sundra Savannah Dose:  10 mg Take 2 Tabs by mouth daily for 30 days. Quantity:  30 Tab Refills:  0  
   
 azaTHIOprine 50 mg tablet Commonly known as:  The [employer] Dose:  50 mg Take 50 mg by mouth daily (after breakfast). Refills:  0  
   
 cyanocobalamin 2,500 mcg sublingual tablet Commonly known as:  VITAMIN B-12 Dose:  2500 mcg Take 1 Tab by mouth daily. Quantity:  90 Tab Refills:  1  
   
 fluticasone-vilanterol 200-25 mcg/dose inhaler Commonly known as:  BREO ELLIPTA Dose:  1 Puff Take 1 Puff by inhalation daily. Rinse mouth out after use Quantity:  1 Inhaler Refills:  5  
   
 gemfibrozil 600 mg tablet Commonly known as:  LOPID Dose:  300 mg Take 300 mg by mouth daily. Refills:  0  
   
 oxyCODONE IR 5 mg immediate release tablet Commonly known as:  Gianluca Bills Take 1 tab in AM and HALF to 1 tab in PM if needed on days where back pain is severe Quantity:  40 Tab Refills:  0  
   
 pantoprazole 40 mg tablet Commonly known as:  PROTONIX Dose:  40 mg Take 1 Tab by mouth Daily (before breakfast). Quantity:  30 Tab Refills:  0  
   
 PLAQUENIL 200 mg tablet Generic drug:  hydroxychloroquine Dose:  200 mg Take 200 mg by mouth two (2) times a day. Refills:  0  
   
 polyethylene glycol 17 gram packet Commonly known as:  Lugene Minder Dose:  17 g Take 1 Packet by mouth daily. Quantity:  30 Packet Refills:  0 Senna 8.6 mg tablet Generic drug:  senna Dose:  1 Tab Take 1 Tab by mouth daily as needed for Constipation. for constipation Refills:  0 STOP taking these medications Comments  
 amoxicillin 500 mg Tab Current Immunizations Name Date Influenza Vaccine 9/12/2017, 9/9/2012 Influenza Vaccine Split 9/1/2011 ZZZ-RETIRED (DO NOT USE) Pneumococcal Vaccine (Unspecified Type) 10/1/2009 Surgery Information ID Date/Time Status Primary Surgeon All Procedures Location [id number] 6/17/2018 1320 Posted Case Anesthesia INFUSION CATHETER INSERTION [facility] MAIN OR    
 INFUSION CATHETER INSERTION:  central line insertion [id number] 6/21/2018 0800 118 BRANDI Meza MD RIGHT VIDEO ASSITED THORASCOPY, DECORTICATION [facility] MAIN OR Follow-up Information Follow up With Details Comments Contact Info Belén Kearns MD On 6/18/2018 at [facility] for cardiology followup/ Blood pressure evaluation. Please arrive at 1:40PM [place] [address] [address] [facility] 57 
[phone] Belén Kearns MD On 12/21/2018 Echocardiogram at 1PM, followed by appointment with Dr. Butch Morse. Please arrive at 12:40PM [place] [address] [address] [facility] 57 
[phone] [address] 
1st Floor [facility] [address] 
[phone] Giovanny Vann MD In 2 weeks Call office to schedule appointment with Dr. Juana Huynh for two weeks after drain removal.  [address] 
[address] [place] [address] 
[phone] Fabricio Collazo MD In 1 week Go to your dislysis as previously scheduled. [address] [facility] [address] [facility] 57 
[phone] Kamran Macias NP   [address] [facility] 57 
[phone] Discharge Instructions *DISCHARGE INSTRUCTIONS AFTER LUNG SURGERY [address] [facility] [address] [facility], [address] Leave the chest tube dressing in place for 48 hours, then you can remove it and shower. SURGICAL INCISION: 
 
You will have two or three small incisions. These incisions are sealed with sutures that dissolve. The lower incision is from the chest tube. This lower incision will seal itself in 5 to 7 days. Until then you may have drainage from that incision. The drainage will be thin yellowish or red in color and may drain for 5 to 7 days. This is normal and expected. INCISION CARE: 
 
Wash incisions daily with soap. Pat dry. Showers only, no tub baths. If you have drainage, you can place a bandage over the area, otherwise keep open to air. You may experience drainage of clear-strawberry colored fluid from the chest tube site. This is to be expected and will stop in 24-48 hours. PAIN: 
 
What you will experience: ? Tenderness and soreness around incisions ? Burning and/or numbness ? Soreness around front/back of chest 
 
For relief of discomfort: ? Take the pain medicine you were given at discharge ? Aleve one or two tablets every 12 hrs (with food) along with pain medicine (this can be purchased over the counter at your local pharmacy/drug store). Advil may be substituted. Start this after you finish taking Toradol if prescribed. ? Heating pad 10 minutes at a time to the upper incision area as often as you wish. ? For the Ladies: Spandex or elastic sports bra will give you the support you need without pressure on the incision. Most will find this to be a great comfort. COUGHING: 
 
Coughing is helpful after lung surgery. Place a pillow over your incision and apply pressure when coughing to reduce pain. ACTIVITY: 
 
DO: 1. Walk daily outside if weather permits, at a mall if not. Increase your distance each day. Exercise has many benefits. It promotes healing, expands your lungs,  
             helps you cough, relaxes your body, tones muscles, lowers blood pressure and  
            improves your appetite. After you exercise expect to feel tired and probably short  
             of breath. Plan to take a nap 1-2 times a day to regain your strength . 2. Climb stairs 3. Ride in a car 4. Perform breathing exercises (incentive spirometer) DO NOT: 
            
1. Lift heavy objects (8-10 pounds) 2. Drive a car/truck until after your post-op visit 3. Do heavy yard or housework APPETITE: It is usual to have a decreased appetite after surgery. Exercise is the best stimulant. You may expect to slowly improve over 4-10 days. CALL THE OFFICE IF YOU DEVELOP: 
 
? Increasing pain that is not controlled by the pain medicine. ? Increasing redness or drainage around the incision. ? Unusual or increasing shortness of breath ? Fever greater than 101 degrees ? Change in the color of your sputum to yellow or green, especially if you also have increasing shortness of breath and a fever greater than 101. YOUR MEDICATIONS: 
As instructed FOLLOW-UP APPOINTMENT: 
AFTER DISCHARGE CALL THE OFFICE TO SCHEDULE YOUR VISIT TO SEE US IN 2 WEEKS. Please arrive 45 minutes prior to you appointment time in order to have a chest X-Ray. Check in at [address] on the ground floor of the [facility]. After your X-ray come to the [address] [place] [address] for your appointment. Physician Signature Discharge Instructions PATIENT ID: Crow Angeles MRN: [id number] YOB: 1985 DATE OF ADMISSION: 6/16/2018  8:25 PM   
DATE OF DISCHARGE: 6/28/2018 PRIMARY CARE PROVIDER: Zackary Bettencourt NP  
 
ATTENDING PHYSICIAN: Elisabeth Hutchinson MD 
DISCHARGING PROVIDER: Elisabeth Hutchinson MD   
 To contact this individual call [phone] and ask the  to page. If unavailable ask to be transferred the Adult Hospitalist Department. DISCHARGE DIAGNOSES and ADDITIONAL CARE RECOMMENDATIONS:  
 
Health care associated pneumonia with partially loculated pleural effusion,recurrent. You underwent video assisted thoracostomy and drainage of the fluid. You have completed intravenous antibiotics course in the hospital.Infectious disease and Thoracic surgery team were involved and all agreed you can go home. Watch for the following symptoms: shortness of breath,cough,fever,chest pain and if any of these symptoms occur,go to your doctor or call [phone]. 
  
Abdominal cavity abscess (last admission) -The cat scan on 6/17 showed the pelvic fluid has diminished significantly. We consulted the surgeons and recommend removal of the pigtail catheter,thus the catheter is taken out on 6/28. Please follow up with Dr Christine Johnson for check up. 
  
  
End stage renal disease,on hemodialysis. -Go to your dialysis as before Tuesday,Thursday and Saturdays. 
  
Anemia,you have received 3 units of blood. Ask the dialysis center to check your hemoglobin when you go there Saturday 
  
Lupus: continue your medications which are unchanged. Follow up with your Rheumatologist,Dr Jvuenal Ventura. 
  
Chronic DVT: continue the prophylactic Eliquis 
  
Hypertension,your blood pressure was high and we have made changes to your blood pressure medications  
-See medication and dosage from the after visit summary. CONSULTATIONS: IP CONSULT TO NEPHROLOGY 
IP CONSULT TO THORACIC SURGERY 
IP CONSULT TO INFECTIOUS DISEASES 
IP CONSULT TO INFECTIOUS DISEASES 
IP CONSULT TO INTERVENTIONAL RADIOLOGY 
IP CONSULT TO CARDIOLOGY 
IP CONSULT TO GENERAL SURGERY 
IP CONSULT TO GENERAL SURGERY 
 
PROCEDURES/SURGERIES: Procedure(s): RIGHT VIDEO ASSITED THORASCOPY, DECORTICATION PENDING TEST RESULTS:  
 At the time of discharge the following test results are still pending: none FOLLOW UP APPOINTMENTS:  
Follow-up Information Follow up With Details Comments Contact Info Sicnere Tolbert MD On 6/18/2018 at [facility] for cardiology followup/ Blood pressure evaluation. Please arrive at 1:40PM [address] [place] [address] 
[phone] Sincere Tolbert MD On 12/21/2018 Echocardiogram at 1PM, followed by appointment with Dr. Lloyd Lott. Please arrive at 12:40PM [address] [place] [address] 
[phone] [address] [place], [place] [address] 
1st Floor [place] [address] 
[phone] Shirley Orellana MD In 2 weeks Call office to schedule appointment with Dr. Renetta Carreon for two weeks after drain removal.  [address] 
[address] [place] [address] 
[phone] Fabricio Marks MD In 1 week Go to your dislysis as previously scheduled. [address] [place] [address] [address] [place] [address] 
[phone] DIET: Renal Diet ACTIVITY: Activity as tolerated WOUND CARE: NA 
 
EQUIPMENT needed: own DISCHARGE MEDICATIONS: 
 See Medication Reconciliation Form · It is important that you take the medication exactly as they are prescribed. · Keep your medication in the bottles provided by the pharmacist and keep a list of the medication names, dosages, and times to be taken in your wallet. · Do not take other medications without consulting your doctor. NOTIFY YOUR PHYSICIAN FOR ANY OF THE FOLLOWING:  
Fever over 101 degrees for 24 hours. Chest pain, shortness of breath, fever, chills, nausea, vomiting, diarrhea, change in mentation, falling, weakness, bleeding. Severe pain or pain not relieved by medications. Or, any other signs or symptoms that you may have questions about. DISPOSITION: 
x  Home With: 
 OT  PT  [facility]  RN  
  
 SNF/Inpatient Rehab/LTAC Independent/assisted living Hospice Other:  
 
 
Signed:   
Kasey Interiano MD 
 6/28/2018 
3:16 PM 
 
 
Chart Review Routing History Recipient Method Report Sent By Apollo Fox MD  
Phone: [phone] In [employer] Routed Skye Martinez MD [[id number]] 5/13/2012 12:23 AM 05/13/2012 Davis Aragon MD  
Phone: [phone] In [employer] Routed [employer] [[id number]] 9/5/2013  6:12 AM 09/05/2013 Davis Aragon MD  
Phone: [phone] In Basket Note Review Maricarmen Hurd [[id number]] 4/1/2014  2:18 PM 04/01/2014 Jose Lay MD  
Phone: [phone] In Basket Note Review Maricarmen Hurd [[id number]] 4/1/2014  2:18 PM 04/01/2014 Davis Aragon MD  
Phone: [phone] In Basket Note Review Maricarmen [facility] [[id number]] 9/16/2014 10:43 AM 09/16/2014 Tenisha Kowalski MD  
Phone: [phone] In Basket Note Review [facility] [[id number]] 9/16/2014 10:43 AM 09/16/2014 Genesis Philip MD  
Fax: [phone] Phone: [phone] Fax Note Review Charlene Favre, MD [[id number]] 9/4/2015  8:19 AM 09/04/2015 Davis Aragon MD  
Phone: [phone] In Basket IP Auto Routed Notes Geoffery Apley, MD [[id number]] 10/21/2015 10:33 PM 10/21/2015 Davis Aragon MD  
Phone: [phone] In [employer] Mod MD ROBERT [[id number]] 10/22/2015  9:30 PM 10/22/2015 Davis Aragon MD  
Phone: [phone] In [employer] Routed Notes Merle Haley MD [[id number]] 10/30/2015 11:33 AM 10/30/2015 Narciso Bone MD  
Phone: [phone] In Basket [facility] IP AMB RESULT REPORT IMAGING Stephany Burn [[id number]] 12/11/2015  4:03 PM 10/21/2015 Narciso Bone MD  
Phone: [phone] In Basket [facility] IP AMB RESULT REPORT IMAGING Stephany Burn [[id number]] 12/11/2015  4:04 PM 10/21/2015 Narciso Bone MD  
Fax: [phone] Phone: [phone] Fax [facility] IP AMB RESULT REPORT IMAGING Stephany Burn [[id number]] 12/11/2015  4:05 PM 10/21/2015  
 axel Fax: [phone] Fax Note Review Cindi Perez [[id number]] 1/20/2016 10:55 AM 01/18/2016 Notes/Transcriptions Cindi Perez [[id number]] 1/20/2016 10:55 AM 01/01/2016 Jaspreet Mckeon MD  
Fax: [phone] Phone: [phone] Fax Note Review Clarice Hill MD [[id number]] 6/23/2016  5:10 PM 06/23/2016 Sebas Eugene MD  
Phone: [phone] In Basket IP Auto Routed Notes Celine Walden MD [[id number]] 7/11/2016  7:30 PM 07/11/2016 Sebas Eugene MD  
Phone: [phone] [employer] Routed Chris Martinez MD [[id number]] 7/12/2016  9:42 PM 07/12/2016 Sebas Eugene MD  
Phone: [phone] In Basket IP Auto Routed Notes Gardenia Nowak MD [[id number]] 7/14/2016 12:02 PM 07/14/2016 Jaspreet Mckeon MD  
Fax: [phone] Phone: [phone] Fax Notes Report Clarice Hill MD [[id number]] 10/5/2016  4:18 PM 10/5/2016 Radha Urena NP Phone: [phone] [employer] Routed Notes Michael Steeless[place], [place] [[id number]] 10/14/2017  9:10 PM 10/14/2017 Radha Urena NP Phone: [phone] In Basket IP Auto Routed Notes Beth Aguilar MD [[id number]] 10/17/2017  9:08 AM 10/17/2017 Radha Urena NP Phone: [phone] [employer] Routed Kris Hunter MD [[id number]] 10/28/2017  1:23 AM 10/28/2017 Radha Urena NP Phone: [phone] In Basket IP Auto Routed Notes Beth Aguilar MD [[id number]] 10/31/2017  7:19 AM 10/31/2017 Jaspreet Mckeon MD  
Fax: [phone] Phone: [phone] Fax BASIC PATIENT INFO Radha Urena NP [[id number]] 11/7/2017  1:26 PM   
 Radha Urena NP Phone: [phone] [employer] Routed Keny Melo IV, MD [[id number]] 11/27/2017  5:15 AM 11/27/2017 Radha Urena NP Phone: [phone] In Basket IP Auto Routed Notes Juan A Mayer MD [[id number]] 11/28/2017 12:31 PM 11/28/2017 Radha Urena NP Phone: [phone]  In Basket IP Auto Routed Notes Alfred Shine MD [[id number]] 12/13/2017 3:50 AM 12/13/2017 Lorraine Gonzalez NP Phone: [phone] In Basket IP Auto Routed Notes Drake Hernandez MD [[id number]] 12/22/2017  9:20 AM 12/22/2017 Lorraine Gonzalez NP Phone: [phone] [employer] Routed MD Darshan [[id number]] 3/23/2018  8:25 AM 03/23/2018 Lorraine Gonzalez NP Phone: [phone] In Basket IP Auto Routed Notes Mariella Mehta MD [[id number]] 4/20/2018  2:39 PM 04/20/2018 Lorraine Gonzalez NP Phone: [phone] In Basket IP Auto Routed Notes Francheska Brewer MD [[id number]] 4/29/2018 12:55 PM 04/29/2018 Carlos Mckeon MD  
Phone: [phone] In [employer] IP Auto Routed MD Falquito [[id number]] 4/29/2018  5:39 PM 04/29/2018 Lupe Roche MD  
Phone: [phone] In [employer] IP Auto Routed MD Flaquito [[id number]] 4/29/2018  5:39 PM 04/29/2018 Lorraine Gonzalez NP Phone: [phone] In Basket IP Auto Routed Notes Mariella Mehta MD [[id number]] 4/30/2018 11:16 AM 04/30/2018 Lorraine Gonzalez NP Fax: [phone] Phone: [phone] [facility] Transfer Summary Report Gus Ledbetter [[id number]] 5/2/2018  2:47 PM 5/2/2018 Lorraine Gonzalez NP Fax: [phone] Phone: [phone] [facility] Transfer Summary Report Gus Ledbetter [[id number]] 5/2/2018  2:47 PM 5/2/2018 Lorraine Gonzalez NP Fax: [phone] Phone: [phone] [facility] Transfer Summary Report Gus Ledbetter [[id number]] 5/2/2018  2:47 PM 5/2/2018 Lorraine Gonzalez NP Phone: [phone] In Basket IP Auto Routed Notes Liz Sears MD [[id number]] 5/12/2018  5:47 PM 05/12/2018 Lorraine Gonzalez NP Fax: [phone] Phone: [phone] [facility] Transfer Summary Report Gus Ledbetter [[id number]] 5/18/2018 11:08 AM 5/18/2018 Lorraine Gonzalez NP Phone: [phone] In Basket IP Auto Routed Notes Hasmukh Jackson MD [[id number]] 5/18/2018 11:44 AM 05/18/2018 Martha Junior NP Phone: [phone] In Basket IP Auto Routed Notes Pierre Molina MD [[id number]] 6/17/2018  3:42 AM 06/17/2018 Martha Junior NP Phone: [phone] In Basket IP Auto Routed Renay Mohr MD [[id number]] 6/17/2018  6:40 AM 06/17/2018 Cony Ojeda MD  
Phone: [phone] In Basket IP Auto Routed [employer], MD [[id number]] 6/20/2018  7:50 AM 06/20/2018 Martha Junior NP Fax: [phone] Phone: [phone] [facility] Transfer Summary Report Kasey Carter [[id number]] 6/22/2018  3:36 PM 6/22/2018 Martha Junior NP Phone: [phone] In Basket IP Auto Routed Notes Aggie Santana MD [[id number]] 6/28/2018  4:15 PM 06/28/2018

## 2018-06-17 ENCOUNTER — APPOINTMENT (OUTPATIENT)
Dept: GENERAL RADIOLOGY | Age: 33
DRG: 853 | End: 2018-06-17
Attending: ANESTHESIOLOGY
Payer: MEDICARE

## 2018-06-17 ENCOUNTER — APPOINTMENT (OUTPATIENT)
Dept: CT IMAGING | Age: 33
DRG: 853 | End: 2018-06-17
Attending: INTERNAL MEDICINE
Payer: MEDICARE

## 2018-06-17 PROBLEM — R77.8 TROPONIN LEVEL ELEVATED: Status: ACTIVE | Noted: 2018-06-17

## 2018-06-17 LAB
AMYLASE SERPL-CCNC: 112 U/L (ref 25–115)
ANION GAP SERPL CALC-SCNC: 8 MMOL/L (ref 5–15)
BASOPHILS # BLD: 0 K/UL (ref 0–0.1)
BASOPHILS NFR BLD: 1 % (ref 0–1)
BUN SERPL-MCNC: 20 MG/DL (ref 6–20)
BUN/CREAT SERPL: 5 (ref 12–20)
CALCIUM SERPL-MCNC: 8.5 MG/DL (ref 8.5–10.1)
CHLORIDE SERPL-SCNC: 99 MMOL/L (ref 97–108)
CK MB CFR SERPL CALC: ABNORMAL % (ref 0–2.5)
CK MB SERPL-MCNC: <1 NG/ML (ref 5–25)
CK SERPL-CCNC: 22 U/L (ref 26–192)
CO2 SERPL-SCNC: 32 MMOL/L (ref 21–32)
CREAT SERPL-MCNC: 4.13 MG/DL (ref 0.55–1.02)
DIFFERENTIAL METHOD BLD: ABNORMAL
EOSINOPHIL # BLD: 0 K/UL (ref 0–0.4)
EOSINOPHIL NFR BLD: 0 % (ref 0–7)
ERYTHROCYTE [DISTWIDTH] IN BLOOD BY AUTOMATED COUNT: 17.8 % (ref 11.5–14.5)
FERRITIN SERPL-MCNC: 4182 NG/ML (ref 8–252)
FOLATE SERPL-MCNC: 13.7 NG/ML (ref 5–21)
GLUCOSE SERPL-MCNC: 93 MG/DL (ref 65–100)
HCT VFR BLD AUTO: 29.2 % (ref 35–47)
HGB BLD-MCNC: 8.6 G/DL (ref 11.5–16)
IMM GRANULOCYTES # BLD: 0 K/UL (ref 0–0.04)
IMM GRANULOCYTES NFR BLD AUTO: 0 % (ref 0–0.5)
IRON SATN MFR SERPL: 25 % (ref 20–50)
IRON SERPL-MCNC: 34 UG/DL (ref 35–150)
IRON SERPL-MCNC: 34 UG/DL (ref 35–150)
LACTATE SERPL-SCNC: 1.4 MMOL/L (ref 0.4–2)
LIPASE SERPL-CCNC: 88 U/L (ref 73–393)
LYMPHOCYTES # BLD: 0.6 K/UL (ref 0.8–3.5)
LYMPHOCYTES NFR BLD: 19 % (ref 12–49)
MAGNESIUM SERPL-MCNC: 1.7 MG/DL (ref 1.6–2.4)
MCH RBC QN AUTO: 26.5 PG (ref 26–34)
MCHC RBC AUTO-ENTMCNC: 29.5 G/DL (ref 30–36.5)
MCV RBC AUTO: 89.8 FL (ref 80–99)
MONOCYTES # BLD: 0.1 K/UL (ref 0–1)
MONOCYTES NFR BLD: 3 % (ref 5–13)
NEUTS SEG # BLD: 2.5 K/UL (ref 1.8–8)
NEUTS SEG NFR BLD: 76 % (ref 32–75)
NRBC # BLD: 0 K/UL (ref 0–0.01)
NRBC BLD-RTO: 0 PER 100 WBC
PHOSPHATE SERPL-MCNC: 3 MG/DL (ref 2.6–4.7)
PLATELET # BLD AUTO: 148 K/UL (ref 150–400)
PMV BLD AUTO: 10.7 FL (ref 8.9–12.9)
POTASSIUM SERPL-SCNC: 4.3 MMOL/L (ref 3.5–5.1)
RBC # BLD AUTO: 3.25 M/UL (ref 3.8–5.2)
SODIUM SERPL-SCNC: 139 MMOL/L (ref 136–145)
TIBC SERPL-MCNC: 137 UG/DL (ref 250–450)
TROPONIN I SERPL-MCNC: 0.06 NG/ML
TSH SERPL DL<=0.05 MIU/L-ACNC: 3.01 UIU/ML (ref 0.36–3.74)
VIT B12 SERPL-MCNC: >2000 PG/ML (ref 193–986)
WBC # BLD AUTO: 3.2 K/UL (ref 3.6–11)

## 2018-06-17 PROCEDURE — 74011250636 HC RX REV CODE- 250/636: Performed by: EMERGENCY MEDICINE

## 2018-06-17 PROCEDURE — 65660000001 HC RM ICU INTERMED STEPDOWN

## 2018-06-17 PROCEDURE — 76010000093 HC SPECIAL PROCEDURE

## 2018-06-17 PROCEDURE — 74011250637 HC RX REV CODE- 250/637: Performed by: SPECIALIST

## 2018-06-17 PROCEDURE — 82746 ASSAY OF FOLIC ACID SERUM: CPT | Performed by: INTERNAL MEDICINE

## 2018-06-17 PROCEDURE — 36415 COLL VENOUS BLD VENIPUNCTURE: CPT | Performed by: INTERNAL MEDICINE

## 2018-06-17 PROCEDURE — 94640 AIRWAY INHALATION TREATMENT: CPT

## 2018-06-17 PROCEDURE — 83540 ASSAY OF IRON: CPT | Performed by: INTERNAL MEDICINE

## 2018-06-17 PROCEDURE — 85025 COMPLETE CBC W/AUTO DIFF WBC: CPT | Performed by: INTERNAL MEDICINE

## 2018-06-17 PROCEDURE — 84484 ASSAY OF TROPONIN QUANT: CPT | Performed by: INTERNAL MEDICINE

## 2018-06-17 PROCEDURE — C1751 CATH, INF, PER/CENT/MIDLINE: HCPCS

## 2018-06-17 PROCEDURE — 83735 ASSAY OF MAGNESIUM: CPT | Performed by: INTERNAL MEDICINE

## 2018-06-17 PROCEDURE — 84443 ASSAY THYROID STIM HORMONE: CPT | Performed by: INTERNAL MEDICINE

## 2018-06-17 PROCEDURE — 74011250637 HC RX REV CODE- 250/637: Performed by: HOSPITALIST

## 2018-06-17 PROCEDURE — 82550 ASSAY OF CK (CPK): CPT | Performed by: INTERNAL MEDICINE

## 2018-06-17 PROCEDURE — 74011636637 HC RX REV CODE- 636/637: Performed by: INTERNAL MEDICINE

## 2018-06-17 PROCEDURE — 83690 ASSAY OF LIPASE: CPT | Performed by: INTERNAL MEDICINE

## 2018-06-17 PROCEDURE — 82728 ASSAY OF FERRITIN: CPT | Performed by: INTERNAL MEDICINE

## 2018-06-17 PROCEDURE — 74011000258 HC RX REV CODE- 258: Performed by: INTERNAL MEDICINE

## 2018-06-17 PROCEDURE — 74176 CT ABD & PELVIS W/O CONTRAST: CPT

## 2018-06-17 PROCEDURE — 80048 BASIC METABOLIC PNL TOTAL CA: CPT | Performed by: INTERNAL MEDICINE

## 2018-06-17 PROCEDURE — 93306 TTE W/DOPPLER COMPLETE: CPT

## 2018-06-17 PROCEDURE — 74011250636 HC RX REV CODE- 250/636: Performed by: INTERNAL MEDICINE

## 2018-06-17 PROCEDURE — 84100 ASSAY OF PHOSPHORUS: CPT | Performed by: INTERNAL MEDICINE

## 2018-06-17 PROCEDURE — 74011250637 HC RX REV CODE- 250/637: Performed by: INTERNAL MEDICINE

## 2018-06-17 PROCEDURE — 74011250636 HC RX REV CODE- 250/636: Performed by: ANESTHESIOLOGY

## 2018-06-17 PROCEDURE — 74011000250 HC RX REV CODE- 250: Performed by: INTERNAL MEDICINE

## 2018-06-17 PROCEDURE — 02HV33Z INSERTION OF INFUSION DEVICE INTO SUPERIOR VENA CAVA, PERCUTANEOUS APPROACH: ICD-10-PCS | Performed by: ANESTHESIOLOGY

## 2018-06-17 PROCEDURE — 3331090001 HH PPS REVENUE CREDIT

## 2018-06-17 PROCEDURE — 71045 X-RAY EXAM CHEST 1 VIEW: CPT

## 2018-06-17 PROCEDURE — 83605 ASSAY OF LACTIC ACID: CPT | Performed by: HOSPITALIST

## 2018-06-17 PROCEDURE — 3331090002 HH PPS REVENUE DEBIT

## 2018-06-17 PROCEDURE — 82150 ASSAY OF AMYLASE: CPT | Performed by: INTERNAL MEDICINE

## 2018-06-17 PROCEDURE — 71250 CT THORAX DX C-: CPT

## 2018-06-17 PROCEDURE — 82607 VITAMIN B-12: CPT | Performed by: INTERNAL MEDICINE

## 2018-06-17 PROCEDURE — 36556 INSERT NON-TUNNEL CV CATH: CPT

## 2018-06-17 RX ORDER — BUDESONIDE 0.5 MG/2ML
500 INHALANT ORAL
Status: DISCONTINUED | OUTPATIENT
Start: 2018-06-17 | End: 2018-06-28 | Stop reason: HOSPADM

## 2018-06-17 RX ORDER — PANTOPRAZOLE SODIUM 40 MG/1
40 TABLET, DELAYED RELEASE ORAL
Status: DISCONTINUED | OUTPATIENT
Start: 2018-06-17 | End: 2018-06-28 | Stop reason: HOSPADM

## 2018-06-17 RX ORDER — ONDANSETRON 2 MG/ML
4 INJECTION INTRAMUSCULAR; INTRAVENOUS
Status: DISCONTINUED | OUTPATIENT
Start: 2018-06-17 | End: 2018-06-28 | Stop reason: HOSPADM

## 2018-06-17 RX ORDER — SODIUM CHLORIDE 0.9 % (FLUSH) 0.9 %
5-10 SYRINGE (ML) INJECTION AS NEEDED
Status: DISCONTINUED | OUTPATIENT
Start: 2018-06-17 | End: 2018-06-21

## 2018-06-17 RX ORDER — LANOLIN ALCOHOL/MO/W.PET/CERES
2500 CREAM (GRAM) TOPICAL DAILY
Status: DISCONTINUED | OUTPATIENT
Start: 2018-06-17 | End: 2018-06-28 | Stop reason: HOSPADM

## 2018-06-17 RX ORDER — CLONIDINE HYDROCHLORIDE 0.1 MG/1
0.1 TABLET ORAL 2 TIMES DAILY
Status: DISCONTINUED | OUTPATIENT
Start: 2018-06-17 | End: 2018-06-25

## 2018-06-17 RX ORDER — GEMFIBROZIL 600 MG/1
300 TABLET, FILM COATED ORAL DAILY
Status: DISCONTINUED | OUTPATIENT
Start: 2018-06-17 | End: 2018-06-28 | Stop reason: HOSPADM

## 2018-06-17 RX ORDER — IPRATROPIUM BROMIDE AND ALBUTEROL SULFATE 2.5; .5 MG/3ML; MG/3ML
3 SOLUTION RESPIRATORY (INHALATION)
Status: DISCONTINUED | OUTPATIENT
Start: 2018-06-17 | End: 2018-06-28 | Stop reason: HOSPADM

## 2018-06-17 RX ORDER — HYDRALAZINE HYDROCHLORIDE 20 MG/ML
10 INJECTION INTRAMUSCULAR; INTRAVENOUS
Status: DISCONTINUED | OUTPATIENT
Start: 2018-06-17 | End: 2018-06-25

## 2018-06-17 RX ORDER — AMITRIPTYLINE HYDROCHLORIDE 50 MG/1
25 TABLET, FILM COATED ORAL
Status: DISCONTINUED | OUTPATIENT
Start: 2018-06-17 | End: 2018-06-28 | Stop reason: HOSPADM

## 2018-06-17 RX ORDER — VANCOMYCIN/0.9 % SOD CHLORIDE 1.5G/250ML
1500 PLASTIC BAG, INJECTION (ML) INTRAVENOUS ONCE
Status: COMPLETED | OUTPATIENT
Start: 2018-06-17 | End: 2018-06-24

## 2018-06-17 RX ORDER — MIDAZOLAM HYDROCHLORIDE 1 MG/ML
1 INJECTION, SOLUTION INTRAMUSCULAR; INTRAVENOUS
Status: DISCONTINUED | OUTPATIENT
Start: 2018-06-17 | End: 2018-06-21 | Stop reason: HOSPADM

## 2018-06-17 RX ORDER — LOSARTAN POTASSIUM 50 MG/1
50 TABLET ORAL DAILY
Status: DISCONTINUED | OUTPATIENT
Start: 2018-06-17 | End: 2018-06-28 | Stop reason: HOSPADM

## 2018-06-17 RX ORDER — CARVEDILOL 12.5 MG/1
12.5 TABLET ORAL 2 TIMES DAILY WITH MEALS
Status: DISCONTINUED | OUTPATIENT
Start: 2018-06-17 | End: 2018-06-17

## 2018-06-17 RX ORDER — SODIUM CHLORIDE 0.9 % (FLUSH) 0.9 %
5-10 SYRINGE (ML) INJECTION EVERY 8 HOURS
Status: DISCONTINUED | OUTPATIENT
Start: 2018-06-17 | End: 2018-06-21

## 2018-06-17 RX ORDER — HYDROXYCHLOROQUINE SULFATE 200 MG/1
200 TABLET, FILM COATED ORAL 2 TIMES DAILY
Status: DISCONTINUED | OUTPATIENT
Start: 2018-06-17 | End: 2018-06-28 | Stop reason: HOSPADM

## 2018-06-17 RX ORDER — SENNOSIDES 8.6 MG/1
1 TABLET ORAL
Status: DISCONTINUED | OUTPATIENT
Start: 2018-06-17 | End: 2018-06-28 | Stop reason: HOSPADM

## 2018-06-17 RX ORDER — AMLODIPINE BESYLATE 5 MG/1
10 TABLET ORAL DAILY
Status: DISCONTINUED | OUTPATIENT
Start: 2018-06-17 | End: 2018-06-28 | Stop reason: HOSPADM

## 2018-06-17 RX ORDER — AZATHIOPRINE 50 MG/1
50 TABLET ORAL
Status: DISCONTINUED | OUTPATIENT
Start: 2018-06-17 | End: 2018-06-17

## 2018-06-17 RX ORDER — OXYCODONE AND ACETAMINOPHEN 5; 325 MG/1; MG/1
1 TABLET ORAL
Status: DISCONTINUED | OUTPATIENT
Start: 2018-06-17 | End: 2018-06-21

## 2018-06-17 RX ORDER — ACETAMINOPHEN 325 MG/1
650 TABLET ORAL
Status: DISCONTINUED | OUTPATIENT
Start: 2018-06-17 | End: 2018-06-21

## 2018-06-17 RX ORDER — ARFORMOTEROL TARTRATE 15 UG/2ML
15 SOLUTION RESPIRATORY (INHALATION)
Status: DISCONTINUED | OUTPATIENT
Start: 2018-06-17 | End: 2018-06-28 | Stop reason: HOSPADM

## 2018-06-17 RX ORDER — ALLOPURINOL 100 MG/1
100 TABLET ORAL
Status: DISCONTINUED | OUTPATIENT
Start: 2018-06-17 | End: 2018-06-28 | Stop reason: HOSPADM

## 2018-06-17 RX ORDER — FENTANYL CITRATE 50 UG/ML
25 INJECTION, SOLUTION INTRAMUSCULAR; INTRAVENOUS
Status: DISCONTINUED | OUTPATIENT
Start: 2018-06-17 | End: 2018-06-21

## 2018-06-17 RX ORDER — PREDNISONE 5 MG/1
5 TABLET ORAL DAILY
Status: DISCONTINUED | OUTPATIENT
Start: 2018-06-17 | End: 2018-06-25

## 2018-06-17 RX ORDER — CARVEDILOL 12.5 MG/1
25 TABLET ORAL 2 TIMES DAILY WITH MEALS
Status: DISCONTINUED | OUTPATIENT
Start: 2018-06-17 | End: 2018-06-28 | Stop reason: HOSPADM

## 2018-06-17 RX ORDER — POLYETHYLENE GLYCOL 3350 17 G/17G
17 POWDER, FOR SOLUTION ORAL DAILY
Status: DISCONTINUED | OUTPATIENT
Start: 2018-06-17 | End: 2018-06-21

## 2018-06-17 RX ADMIN — CLONIDINE HYDROCHLORIDE 0.1 MG: 0.1 TABLET ORAL at 17:00

## 2018-06-17 RX ADMIN — VANCOMYCIN HYDROCHLORIDE 1500 MG: 10 INJECTION, POWDER, LYOPHILIZED, FOR SOLUTION INTRAVENOUS at 03:29

## 2018-06-17 RX ADMIN — BUDESONIDE 500 MCG: 0.5 INHALANT RESPIRATORY (INHALATION) at 19:49

## 2018-06-17 RX ADMIN — Medication 10 ML: at 22:00

## 2018-06-17 RX ADMIN — CARVEDILOL 25 MG: 12.5 TABLET, FILM COATED ORAL at 16:50

## 2018-06-17 RX ADMIN — PREDNISONE 5 MG: 5 TABLET ORAL at 08:31

## 2018-06-17 RX ADMIN — APIXABAN 5 MG: 5 TABLET, FILM COATED ORAL at 08:30

## 2018-06-17 RX ADMIN — OXYCODONE AND ACETAMINOPHEN 1 TABLET: 5; 325 TABLET ORAL at 21:23

## 2018-06-17 RX ADMIN — MIDAZOLAM 1 MG: 1 INJECTION INTRAMUSCULAR; INTRAVENOUS at 13:45

## 2018-06-17 RX ADMIN — PIPERACILLIN SODIUM,TAZOBACTAM SODIUM 3.38 G: 3; .375 INJECTION, POWDER, FOR SOLUTION INTRAVENOUS at 21:23

## 2018-06-17 RX ADMIN — SODIUM CHLORIDE 1000 ML: 900 INJECTION, SOLUTION INTRAVENOUS at 00:05

## 2018-06-17 RX ADMIN — FENTANYL CITRATE 25 MCG: 50 INJECTION, SOLUTION INTRAMUSCULAR; INTRAVENOUS at 21:24

## 2018-06-17 RX ADMIN — FENTANYL CITRATE 25 MCG: 50 INJECTION, SOLUTION INTRAMUSCULAR; INTRAVENOUS at 11:53

## 2018-06-17 RX ADMIN — HYDROXYCHLOROQUINE SULFATE 200 MG: 200 TABLET, FILM COATED ORAL at 08:30

## 2018-06-17 RX ADMIN — HYDROXYCHLOROQUINE SULFATE 200 MG: 200 TABLET, FILM COATED ORAL at 17:00

## 2018-06-17 RX ADMIN — CARVEDILOL 12.5 MG: 12.5 TABLET, FILM COATED ORAL at 08:30

## 2018-06-17 RX ADMIN — PIPERACILLIN SODIUM,TAZOBACTAM SODIUM 3.38 G: 3; .375 INJECTION, POWDER, FOR SOLUTION INTRAVENOUS at 10:23

## 2018-06-17 RX ADMIN — MIDAZOLAM 1 MG: 1 INJECTION INTRAMUSCULAR; INTRAVENOUS at 13:37

## 2018-06-17 RX ADMIN — AMLODIPINE BESYLATE 10 MG: 5 TABLET ORAL at 08:30

## 2018-06-17 RX ADMIN — FENTANYL CITRATE 25 MCG: 50 INJECTION, SOLUTION INTRAMUSCULAR; INTRAVENOUS at 16:51

## 2018-06-17 RX ADMIN — ARFORMOTEROL TARTRATE 15 MCG: 15 SOLUTION RESPIRATORY (INHALATION) at 19:49

## 2018-06-17 RX ADMIN — LOSARTAN POTASSIUM 50 MG: 50 TABLET ORAL at 10:30

## 2018-06-17 RX ADMIN — FENTANYL CITRATE 25 MCG: 50 INJECTION, SOLUTION INTRAMUSCULAR; INTRAVENOUS at 07:13

## 2018-06-17 RX ADMIN — OXYCODONE AND ACETAMINOPHEN 1 TABLET: 5; 325 TABLET ORAL at 10:31

## 2018-06-17 RX ADMIN — FENTANYL CITRATE 25 MCG: 50 INJECTION, SOLUTION INTRAMUSCULAR; INTRAVENOUS at 03:04

## 2018-06-17 RX ADMIN — GEMFIBROZIL 300 MG: 600 TABLET ORAL at 08:30

## 2018-06-17 RX ADMIN — ALLOPURINOL 100 MG: 100 TABLET ORAL at 08:30

## 2018-06-17 RX ADMIN — Medication 10 ML: at 07:13

## 2018-06-17 RX ADMIN — BUDESONIDE 500 MCG: 0.5 INHALANT RESPIRATORY (INHALATION) at 07:31

## 2018-06-17 RX ADMIN — PANTOPRAZOLE SODIUM 40 MG: 40 TABLET, DELAYED RELEASE ORAL at 07:13

## 2018-06-17 RX ADMIN — CLONIDINE HYDROCHLORIDE 0.1 MG: 0.1 TABLET ORAL at 10:30

## 2018-06-17 RX ADMIN — AZATHIOPRINE 50 MG: 50 TABLET ORAL at 08:35

## 2018-06-17 RX ADMIN — OXYCODONE AND ACETAMINOPHEN 1 TABLET: 5; 325 TABLET ORAL at 01:13

## 2018-06-17 RX ADMIN — APIXABAN 2.5 MG: 2.5 TABLET, FILM COATED ORAL at 21:23

## 2018-06-17 RX ADMIN — Medication 2500 MCG: at 08:31

## 2018-06-17 RX ADMIN — Medication 10 ML: at 13:16

## 2018-06-17 RX ADMIN — ARFORMOTEROL TARTRATE 15 MCG: 15 SOLUTION RESPIRATORY (INHALATION) at 07:31

## 2018-06-17 NOTE — PROGRESS NOTES
Pharmacist Note - Vancomycin Dosing    Consult provided for this 29 y.o. female for indication of sepsis. Antibiotic regimen(s): Zosyn and Vancomycin    Recent Labs      18   2233   WBC  2.9*   CREA  3.08*   BUN  11     Frequency of BMP: daily  Height: 165 cm  Weight: 62 kg  Est CrCl: PD, pt making a little urine  Temp (24hrs), Av.9 °F (37.2 °C), Min:97.5 °F (36.4 °C), Max:99.7 °F (37.6 °C)    Cultures: blood      Goal trough = 15 - 20 mcg/mL    Therapy will be initiated with a loading dose of 1500 mg IV x 1. Pharmacy to follow patient daily and order levels / make dose adjustments as appropriate.

## 2018-06-17 NOTE — PROGRESS NOTES
Problem: Falls - Risk of  Goal: *Absence of Falls  Document Alex Fall Risk and appropriate interventions in the flowsheet. Outcome: Progressing Towards Goal  Fall Risk Interventions:            Medication Interventions: Assess postural VS orthostatic hypotension      Bedside shift change report given to Medina Lund RN (oncoming nurse) by DANITZA Staley (offgoing nurse). Report included the following information SBAR.

## 2018-06-17 NOTE — PERIOP NOTES
TRANSFER - OUT REPORT:    Verbal report given to Adriana Jimenez  being transferred to  for ordered procedure       Report consisted of patients Situation, Background, Assessment and   Recommendations(SBAR). Time Pre op antibiotic given:See MAR  Anesthesia Stop time: 8441  Franco Present on Transfer to floor:N/A  Order for Franco on Chart:N/A  Discharge Prescriptions with Chart:N/A    Information from the following report(s) SBAR, OR Summary, Procedure Summary, Intake/Output and MAR was reviewed with the receiving nurse. Opportunity for questions and clarification was provided. Is the patient on 02? YES       L/Min 2       Other N/A    Is the patient on a monitor? YES    Is the nurse transporting with the patient? NO    Surgical Waiting Area notified of patient's transfer from PACU?  YES      The following personal items collected during your admission accompanied patient upon transfer:   Dental Appliance:    Vision: Visual Aid: None  Hearing Aid:    Jewelry:    Clothing:    Other Valuables:    Valuables sent to safe:

## 2018-06-17 NOTE — H&P
[facility]  HISTORY AND PHYSICAL      Chloé Briones.  MR#: [id number]  : 1985  ACCOUNT #: [de-identified]   ADMIT DATE: 2018    Admission order was placed at 0044 hours and the patient was seen shortly after that. PRIMARY CARE PHYSICIAN:  Dr. Pepper Burns INFORMATION:  The patient. CHIEF COMPLAINT:  Shortness of breath. HISTORY OF PRESENT ILLNESS:  This is a 29-year-old woman with a past medical history significant for end-stage renal disease on hemodialysis, lupus, asthma, thromboembolism, dyslipidemia, was in her usual state of health until the day of presentation at the emergency room when the patient developed shortness of breath. The shortness of breath is progressive, it is with little or no activity. Patient had routine dialysis done. After the dialysis, that is when the patient developed the shortness of breath. Patient used to be on peritoneal dialysis, but was converted to hemodialysis because of recurrent infection. The patient was last admitted to this hospital from 2018 to 2018. She was admitted and treated for sepsis, attributed to multiple abdominal abscesses. Patient underwent drainage of the abdominal abscess. Patient also underwent drainage of a right pleural effusion. She was discharged home to complete a course of Augmentin. She stated that she has been having shortness of breath on and off since she was discharged from hospital.  A day before coming to the emergency room here, she was seen at another emergency room. Patient was evaluated, but was not admitted. When the patient arrived at the emergency room, she was found to have elevated lactic acid level, low grade temperature, tachycardia. Code sepsis was called. The chest x-ray shows evidence of pneumonia. Patient was started on antibiotics and was referred to the hospitalist service for evaluation for admission.     PAST MEDICAL HISTORY: End-stage renal disease on hemodialysis, lupus, asthma, thromboembolism on Eliquis, dyslipidemia. ALLERGIES:  PATIENT IS ALLERGIC TO COMPAZINE. MEDICATIONS:  Albuterol 90 mcg 1-2 puffs by inhalation every 4 hours as needed for wheezing or shortness of breath, allopurinol 100 mg daily, Elavil 25 mg daily at bedtime as needed for sleep, Norvasc 5 mg daily, Eliquis 5 mg twice daily, Imuran 50 mg daily, Coreg 12.5 mg twice daily, Breo 200/25 mcg inhalation daily, Lopid 300 mg daily, Plaquenil 200 mg twice daily, oxycodone IR, dosage as directed, Protonix 40 mg daily, MiraLax 17 grams daily, prednisone 5 mg daily, senna 8.6 mg daily as needed for constipation. FAMILY HISTORY:  This was reviewed. Father had diabetes and hypertension. Mother had diabetes. PAST SURGICAL HISTORY:  This is significant for  section, placement of vascular access for hemodialysis. SOCIAL HISTORY:  No history of alcohol or tobacco abuse. REVIEW OF SYSTEMS:  HEENT:  No headache, no dizziness, no blurring of vision. No photophobia. RESPIRATORY:  Positive for shortness of breath. No cough, no hemoptysis. CARDIOVASCULAR:  No chest pain, no orthopnea, no palpitations. GASTROINTESTINAL:  No nausea and vomiting, no diarrhea, no constipation. GENITOURINARY:  No dysuria, no urgency and no frequency. All other systems are reviewed and they are negative. PHYSICAL EXAMINATION:  GENERAL:  Patient appeared ill, in moderate distress. VITAL SIGNS:  On arrival at the emergency room, temperature 97.5, pulse 111, respiratory rate 18, blood pressure 169/122, oxygen saturation 100% on room air. HEAD:  Normocephalic, atraumatic. EYES:  Normal eye movements. No redness, no drainage, no discharge. EARS:  Normal external ears with no obvious drainage. NOSE:  No deformity and no drainage. MOUTH AND THROAT:  No visible oral lesion. NECK:  Supple, no JVD, no thyromegaly. CHEST:  Clear breath sounds.   No wheezing, no crackles. HEART:  Normal S1 and S2, regular. No clinically appreciable murmur. ABDOMEN:  Soft, nontender, normal bowel sounds. CENTRAL NERVOUS SYSTEM:  Alert, oriented x3. No gross focal neurological deficits. EXTREMITIES:  No edema. Pulses 2+ bilaterally. MUSCULOSKELETAL:  No obvious joint deformity or swelling. SKIN:  Intact vascular access with a dressing, right upper arm noted. PSYCHIATRIC:  Normal mood and affect. LYMPHATIC SYSTEM:  No cervical lymphadenopathy. DIAGNOSTIC DATA:  Chest x-ray shows a right middle or lower lobe airspace disease. EKG shows sinus tachycardia and nonspecific ST and T-wave abnormalities. LABORATORY DATA:  Hematology:  WBC 2.9, hemoglobin 9.2, hematocrit 32.7, platelet 802. Cardiac profile:  Troponin 0.05. Chemistry:  Sodium 140, potassium 4.0, chloride 104, CO2 of 26, glucose 76, BUN 11, creatinine 3.08, calcium 7.6, bilirubin total 0.3, ALT 10, AST 60, alkaline phosphatase 75, total protein 7.3, albumin level 2.2, globulin 5.1. Lactic acid level 3.8. ASSESSMENT:  1. Suspected sepsis. 2.  Suspected Healthcare-associated pneumonia. 3.  End-stage renal disease, on hemodialysis. 4.  Anemia. 5.  Lupus. 6.  Asthma. 7.  Thromboembolism. 8.  Elevated troponin level. 9.  Dyslipidemia. 10.  Leukopenia. PLAN:  1. Suspected sepsis. We will admit the patient for further evaluation and treatment. The diagnosis of sepsis is supported by tachycardia, elevated lactic acid level, leukopenia and is most likely coming from Healthcare-associated pneumonia. We will start the patient on vancomycin and Zosyn. We will await blood culture result, if done in the emergency room. We will obtain a CT scan of the chest, abdomen and pelvis to evaluate the patient for possible sources of sepsis. Patient was recently treated for abdominal abscess. 2.  Healthcare-associated pneumonia. As stated above, this is the potential source of sepsis.   Patient will be started on antibiotics as stated above. We will await the result of the CT scan of the chest to further evaluate the patient for pneumonia and to evaluate the patient for empyema or parapneumonic effusion. 3.  End-stage renal disease, on hemodialysis. Patient's nephrologist will be consulted for continuation of hemodialysis during hospitalization. 4.  Anemia, this is most likely secondary to end-stage renal disease. We will carry out anemia workup including checking stool guaiac to rule out occult gastrointestinal bleed. 5.  Lupus. We will resume preadmission medications. 6.  Asthma. We will continue with home medication. Patient will also be placed on DuoNeb. 7.  Thromboembolism. We will continue with Eliquis. This was present on admission. 8.  Elevated troponin level. The patient denies chest pain. We will obtain serial cardiac markers. We will continue with Eliquis, beta blocker. Cardiology consult will be requested to assist in further evaluation and treatment. 9.  Dyslipidemia. We will resume her home medications. 10.  Leukopenia. This could be due to the sepsis. We will monitor. OTHER ISSUES:  CODE STATUS:  The patient is FULL CODE. Patient is already on Eliquis. Because of that, there is no need for DVT prophylaxis with Lovenox.       MD EBONY Ling/KELL  D: 06/17/2018 03:41     T: 06/17/2018 05:31  JOB #: [id number]  CC: Marlo Stover NP

## 2018-06-17 NOTE — PROGRESS NOTES
Spoke with Dr. Duane Rust to make him aware of positive blood culture. No orders received. Problem: Falls - Risk of  Goal: *Absence of Falls  Document Alex Fall Risk and appropriate interventions in the flowsheet. Outcome: Progressing Towards Goal  Fall Risk Interventions:            Medication Interventions: Patient to call before getting OOB, Teach patient to arise slowly                  Problem: Sepsis: Day 2  Goal: *Tolerating diet  Outcome: Progressing Towards Goal  Tolerating diet well. Goal: Activity/Safety  Outcome: Progressing Towards Goal  Fall precautions in place. Educated on energy conservation. Bedside shift change report given to Siobhan Shaw RN (oncoming nurse) by Thanh Calderon (offgoing nurse). Report included the following information SBAR, Kardex, Intake/Output, MAR, Accordion and Recent Results.

## 2018-06-17 NOTE — PROCEDURES
Central Line Placement    Start time: 6/17/2018 1:40 PM  End time: 6/10/2018 1:57 PM  Performed by: Selma Macario  Authorized by: Selma Macario     Indications: vascular access  Preanesthetic Checklist: patient identified, risks and benefits discussed, anesthesia consent, site marked, patient being monitored and timeout performed      Pre-procedure: All elements of maximal sterile barrier technique followed?  Yes    2% Chlorhexidine for cutaneous antisepsis, Hand hygiene performed prior to catheter insertion and Ultrasound guidance              Procedure:   Prep:  ChloraPrep  Location:  Internal jugular  Orientation:  Left  Patient position:  Trendelenburg  Catheter type:  Single lumen  Catheter size:  7 Fr  Catheter length:  16 cm  Number of attempts:  2  Successful placement: Yes      Assessment:   Post-procedure:  Catheter secured, sterile dressing applied and sterile dressing with CHG applied  Assessment:  Placement verified by x-ray, free fluid flow, blood return through all ports and guidewire removal verified  Insertion:  Uncomplicated  Patient tolerance:  Patient tolerated the procedure well with no immediate complications  Cannulated Right IJ but unable to freely pass wire

## 2018-06-17 NOTE — CONSULTS
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    Encounter for monitoring opioid maintenance therapy Z51.81, Z79.891    Malignant hypertension I10    ESRD on peritoneal dialysis ([facility] [address] N18.6, Z99.2    Anemia of renal disease D63.1    Dependence on peritoneal dialysis ([facility] [address] Z99.2    ACP (advance care planning) Z71.89    Chronic bilateral low back pain without sciatica M54.5, G89.29    Hypertension I10    Hypokalemia E87.6    Hypomagnesemia E83.42    Hypocalcemia E83.51    History of pulmonary embolism Z86.711    Peritonitis (HCC) K65.9    Generalized abdominal pain R10.84    Other constipation K59.09    Other ascites R18.8    Sepsis (HCC) A41.9    Troponin level elevated R74.8       Recommendations:    intensify medication RX for hypertension and obtain echocardiogram  Colleen Pond MD  [phone]  Alexandria Thompson is a 29 y.o. female who presented 6/16/2018 with complaints of shortness of breath and pneumonia. The symptoms began approximately 6 days ago. She has no history of cardiac disease including CAD, MI, CHF and Atrial Fib. She has lupus and ESRD on hemodialysis, used to be on PD but had multiple abdominal abscesses. Examination by the attending physician suggested the possibility of CAD due to borderline troponin of 0.05. At present the patient has slightly improved since initial presentation. Therapy thus far has included O2 and antibiotics. Cardiology has been consulted to assist in the management of this patient.     Current Facility-Administered Medications   Medication Dose Route Frequency    acetaminophen (TYLENOL) tablet 650 mg  650 mg Oral Q4H PRN    oxyCODONE-acetaminophen (PERCOCET) 5-325 mg per tablet 1 Tab  1 Tab Oral Q4H PRN    albuterol-ipratropium (DUO-NEB) 2.5 MG-0.5 MG/3 ML  3 mL Nebulization Q4H PRN    piperacillin-tazobactam (ZOSYN) 3.375 g in 0.9% sodium chloride (MBP/ADV) 100 mL  3.375 g IntraVENous Q12H    Vancomycin Pharmacy Dosing   Other Rx Dosing/Monitoring    allopurinol (ZYLOPRIM) tablet 100 mg  100 mg Oral DAILY AFTER BREAKFAST    amitriptyline (ELAVIL) tablet 25 mg  25 mg Oral QHS PRN    amLODIPine (NORVASC) tablet 10 mg  10 mg Oral DAILY    apixaban (ELIQUIS) tablet 5 mg  5 mg Oral Q12H    azaTHIOprine (IMURAN) tablet 50 mg  50 mg Oral DAILY AFTER BREAKFAST    cyanocobalamin (VITAMIN B12) tablet 2,500 mcg  2,500 mcg Oral DAILY    gemfibrozil (LOPID) tablet 300 mg  300 mg Oral DAILY    hydroxychloroquine (PLAQUENIL) tablet 200 mg  200 mg Oral BID    pantoprazole (PROTONIX) tablet 40 mg  40 mg Oral ACB    polyethylene glycol (MIRALAX) packet 17 g  17 g Oral DAILY    predniSONE (DELTASONE) tablet 5 mg  5 mg Oral DAILY    senna (SENOKOT) tablet 8.6 mg  1 Tab Oral DAILY PRN    sodium chloride (NS) flush 5-10 mL  5-10 mL IntraVENous Q8H    sodium chloride (NS) flush 5-10 mL  5-10 mL IntraVENous PRN    ondansetron (ZOFRAN) injection 4 mg  4 mg IntraVENous Q4H PRN    fentaNYL citrate (PF) injection 25 mcg  25 mcg IntraVENous Q4H PRN    arformoterol (BROVANA) neb solution 15 mcg  15 mcg Nebulization BID RT    And    budesonide (PULMICORT) 500 mcg/2 ml nebulizer suspension  500 mcg Nebulization BID RT    hydrALAZINE (APRESOLINE) 20 mg/mL injection 10 mg  10 mg IntraVENous Q6H PRN    vancomycin (VANCOCIN) 500 mg in 0.9% sodium chloride (MBP/ADV) 100 mL  500 mg IntraVENous DIALYSIS PRN    carvedilol (COREG) tablet 25 mg  25 mg Oral BID WITH MEALS    losartan (COZAAR) tablet 50 mg  50 mg Oral DAILY    cloNIDine HCl (CATAPRES) tablet 0.1 mg  0.1 mg Oral BID     Past Medical History:   Diagnosis Date    Anemia     secondary to lupus    Asthma     no inhaler use in past 2 to 3 years    Carditis     Chronic kidney disease     ESRD    Chronic pain     DDD (degenerative disc disease), lumbar     ESRD (end stage renal disease) (HCC)     GERD (gastroesophageal reflux disease)     Heart failure ([address]     Hemodialysis patient [facility]) 12/21/2017    [address]  Tuesday,  Thursday,  and Saturday. 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     Poor historian 01/17/2018    With medications    Thromboembolus ([address] 2013    lungs    Transfusion history     Last Transfusion 12/21/2017  at [facility]     Patient Active Problem List   Diagnosis Code    Lupus L93.0    Lupus nephritis ([address] M32.14    Encounter for monitoring opioid maintenance therapy Z51.81, Z79.891    Malignant hypertension I10    ESRD on peritoneal dialysis ([address] N18.6, Z99.2    Anemia of renal disease D63.1    Dependence on peritoneal dialysis ([address] Z99.2    ACP (advance care planning) Z71.89    Chronic bilateral low back pain without sciatica M54.5, G89.29    Hypertension I10    Hypokalemia E87.6    Hypomagnesemia E83.42    Hypocalcemia E83.51    History of pulmonary embolism Z86.711    Peritonitis (HCC) K65.9    Generalized abdominal pain R10.84    Other constipation K59.09    Other ascites R18.8    Sepsis (HCC) A41.9    Troponin level elevated R74.8     Allergies   Allergen Reactions    Compazine [Prochlorperazine Edisylate] Nausea and Vomiting and Palpitations     Social History   Substance Use Topics    Smoking status: Never Smoker    Smokeless tobacco: Never Used    Alcohol use No     Family History   Problem Relation Age of Onset    Diabetes Father     Hypertension Father     Cancer Other      aunt with breast cancer    Diabetes Mother        Review of Symptoms:  A comprehensive review of systems was negative except for that written in the HPI. Objective:      Visit Vitals    BP (!) 164/122    Pulse 90    Temp 97.7 °F (36.5 °C)    Resp 27    Ht 5' 5\" (1.651 m)    Wt 64 kg (141 lb 1.5 oz)    SpO2 98%    BMI 23.48 kg/m2      Physical Exam    Visit Vitals    BP (!) 147/118    Pulse 89    Temp 97.7 °F (36.5 °C)    Resp 27    Ht 5' 5\" (1.651 m)    Wt 64 kg (141 lb 1.5 oz)    SpO2 98%    BMI 23.48 kg/m2     General Appearance:  Well developed, well nourished,alert and oriented x 3,  individual in no acute distress. Ears/Nose/Mouth/Throat:   Hearing grossly normal.         Neck: Supple. Chest:   Lungs clear to auscultation bilaterally. Cardiovascular:  Regular rate and rhythm, S1, S2 normal, no murmur. Abdomen:   Soft, non-tender, bowel sounds are active. Extremities: No edema bilaterally. Skin: Warm and dry.                Cardiographics    Telemetry: normal sinus rhythm  ECG: normal sinus rhythm, nonspecific ST and T waves changes  Echocardiogram: Not done    Labs:   Recent Results (from the past 24 hour(s))   EKG, 12 LEAD, INITIAL    Collection Time: 06/16/18  8:30 PM   Result Value Ref Range    Ventricular Rate 109 BPM    Atrial Rate 109 BPM    P-R Interval 114 ms    QRS Duration 74 ms    Q-T Interval 362 ms    QTC Calculation (Bezet) 487 ms    Calculated P Axis 41 degrees    Calculated R Axis 61 degrees    Calculated T Axis -5 degrees    Diagnosis       Sinus tachycardia  T wave abnormality, consider anterior ischemia  When compared with ECG of 29-APR-2018 08:29,  Nonspecific T wave abnormality, worse in Inferior leads  T wave inversion now evident in Anterior leads     CBC WITH AUTOMATED DIFF    Collection Time: 06/16/18 10:33 PM   Result Value Ref Range    WBC 2.9 (L) 3.6 - 11.0 K/uL    RBC 3.58 (L) 3.80 - 5.20 M/uL    HGB 9.2 (L) 11.5 - 16.0 g/dL    HCT 32.7 (L) 35.0 - 47.0 %    MCV 91.3 80.0 - 99.0 FL    MCH 25.7 (L) 26.0 - 34.0 PG    MCHC 28.1 (L) 30.0 - 36.5 g/dL    RDW 17.6 (H) 11.5 - 14.5 % PLATELET 021 259 - 097 K/uL    MPV 11.0 8.9 - 12.9 FL    NRBC 0.0 0  WBC    ABSOLUTE NRBC 0.00 0.00 - 0.01 K/uL    NEUTROPHILS 60 32 - 75 %    LYMPHOCYTES 35 12 - 49 %    MONOCYTES 3 (L) 5 - 13 %    EOSINOPHILS 1 0 - 7 %    BASOPHILS 1 0 - 1 %    IMMATURE GRANULOCYTES 0 %    ABS. NEUTROPHILS 1.8 1.8 - 8.0 K/UL    ABS. LYMPHOCYTES 1.0 0.8 - 3.5 K/UL    ABS. MONOCYTES 0.1 0.0 - 1.0 K/UL    ABS. EOSINOPHILS 0.0 0.0 - 0.4 K/UL    ABS. BASOPHILS 0.0 0.0 - 0.1 K/UL    ABS. IMM. GRANS. 0.0 K/UL    DF SMEAR SCANNED      RBC COMMENTS ANISOCYTOSIS  1+        RBC COMMENTS POLYCHROMASIA  PRESENT        RBC COMMENTS OVALOCYTES  PRESENT        RBC COMMENTS HYPOCHROMIA  1+       METABOLIC PANEL, COMPREHENSIVE    Collection Time: 06/16/18 10:33 PM   Result Value Ref Range    Sodium 140 136 - 145 mmol/L    Potassium 4.0 3.5 - 5.1 mmol/L    Chloride 104 97 - 108 mmol/L    CO2 26 21 - 32 mmol/L    Anion gap 10 5 - 15 mmol/L    Glucose 76 65 - 100 mg/dL    BUN 11 6 - 20 MG/DL    Creatinine 3.08 (H) 0.55 - 1.02 MG/DL    BUN/Creatinine ratio 4 (L) 12 - 20      GFR est AA 21 (L) >60 ml/min/1.73m2    GFR est non-AA 18 (L) >60 ml/min/1.73m2    Calcium 7.6 (L) 8.5 - 10.1 MG/DL    Bilirubin, total 0.3 0.2 - 1.0 MG/DL    ALT (SGPT) 10 (L) 12 - 78 U/L    AST (SGOT) 60 (H) 15 - 37 U/L    Alk.  phosphatase 75 45 - 117 U/L    Protein, total 7.3 6.4 - 8.2 g/dL    Albumin 2.2 (L) 3.5 - 5.0 g/dL    Globulin 5.1 (H) 2.0 - 4.0 g/dL    A-G Ratio 0.4 (L) 1.1 - 2.2     TROPONIN I    Collection Time: 06/16/18 10:33 PM   Result Value Ref Range    Troponin-I, Qt. 0.05 (H) <0.05 ng/mL   CK W/ REFLX CKMB    Collection Time: 06/16/18 10:33 PM   Result Value Ref Range    CK 33 26 - 192 U/L   CULTURE, BLOOD, PAIRED    Collection Time: 06/16/18 10:33 PM   Result Value Ref Range    Special Requests: NO SPECIAL REQUESTS      Culture result: NO GROWTH AFTER 7 HOURS     LACTIC ACID    Collection Time: 06/16/18 10:37 PM   Result Value Ref Range    Lactic acid 3.8 (HH) 0.4 - 2.0 MMOL/L       Bertin Cox MD

## 2018-06-17 NOTE — PROGRESS NOTES
[address] with Dr. Mira Jc about patient elevated BP. MD advised to continue to monitor and if goes above 168 systolic to notify him for a PRN order of Hydralazine. Also discussed with MD patients limited access. Patient has fistula in right arm, and one IV on left thumb. Patient is getting IV antibiotic therapy Q4 blood draws, and IV medication. MD ordered PICC line for patient.

## 2018-06-17 NOTE — ED NOTES
[address] Code Sepsis called. Bedside shift change report given to Levy Amador (oncoming nurse) by Ryanne (offgoing nurse). Report included the following information SBAR.     0143 TRANSFER - OUT REPORT:    Verbal report given to April(name) on Agnes Rae  being transferred to [facility]) for routine progression of care       Report consisted of patients Situation, Background, Assessment and   Recommendations(SBAR). Information from the following report(s) SBAR was reviewed with the receiving nurse. Lines:   Peripheral IV 06/16/18 Left Wrist (Active)   Site Assessment Clean, dry, & intact 6/16/2018  8:49 PM   Phlebitis Assessment 0 6/16/2018  8:49 PM   Infiltration Assessment 0 6/16/2018  8:49 PM   Dressing Status Clean, dry, & intact 6/16/2018  8:49 PM   Dressing Type Tape;Transparent 6/16/2018  8:49 PM   Hub Color/Line Status Blue; Infusing;Flushed;Patent 6/16/2018  8:49 PM        Opportunity for questions and clarification was provided.       Patient transported with:   Monitor  O2 @ 1 liters  Registered Nurse

## 2018-06-17 NOTE — PROGRESS NOTES
Hospitalist Progress Note  Radha Jack MD  Answering service: [phone] OR 36 from in house phone  Cell: [phone]      Date of Service:  2018  NAME:  Jessica Guerra  :  1985  MRN:  [id number]      Admission Summary: This is a 28-year-old woman with a past medical history significant for end-stage renal disease on hemodialysis, lupus, asthma, thromboembolism, dyslipidemia, was in her usual state of health until the day of presentation at the emergency room when the patient developed shortness of breath. The shortness of breath is progressive, it is with little or no activity. Patient had routine dialysis done. After the dialysis, that is when the patient developed the shortness of breath. Patient used to be on peritoneal dialysis, but was converted to hemodialysis because of recurrent infection. The patient was last admitted to this hospital from 2018 to 2018. She was admitted and treated for sepsis, attributed to multiple abdominal abscesses. Patient underwent drainage of the abdominal abscess. Patient also underwent drainage of a right pleural effusion. She was discharged home to complete a course of Augmentin. She stated that she has been having shortness of breath on and off since she was discharged from hospital.  A day before coming to the emergency room here, she was seen at another emergency room. Patient was evaluated, but was not admitted. When the patient arrived at the emergency room, she was found to have elevated lactic acid level, low grade temperature, tachycardia. Code sepsis was called.   The chest x-ray shows evidence of pneumonia  Similar presentation last admission as well    Interval history / Subjective:     F/u Pneumonia   Feeling much better than yesterday  No chest pain, headache  Assessment & Plan:     Health care associated Pneumonia  -CXR  Unchanged mild right pleural effusion with patchy opacification right base  -Vanc/Zosyn  -Follow cultures  -Awaiting CT chest and abd    Suspected sepsis  -as above  -blood culture 6/16 [facility] in chains 1/2  -Repeat blood culture tomorrow    ESRD  -on HD (TTS)  -Appreciate Nephrology    Anemia  -likely of chronic disease    Lupus  -Outpatient being followed by Dr Omero Montano  -hold imuran     History of DVT  -on Eliquis  -Will change the dose of eliquis per nephrology rec    Elevated troponin  -unremarkable  -Appreciate discussion with Cardiology  -Follow echo    Dyslipidemia  -continue home meds    Renal diet    Code status: FULL CODE  DVT prophylaxis: Eliquis  PTA: home    Plan: Continue antibiotics, follow cultures, CTs    Care Plan discussed with: Patient/Family  Disposition: TBD     Hospital Problems  Date Reviewed: 6/17/2018          Codes Class Noted POA    Troponin level elevated ICD-10-CM: R74.8  ICD-9-CM: 790.6  6/17/2018 Unknown        * (Principal)Sepsis ([address] ICD-10-CM: A41.9  ICD-9-CM: 038.9, 995.91  4/29/2018 Yes                Review of Systems:   A comprehensive review of systems was negative except for that written in the HPI. Vital Signs:    Last 24hrs VS reviewed since prior progress note. Most recent are:  Visit Vitals    BP (!) 124/96 (BP 1 Location: Left arm, BP Patient Position: At rest)    Pulse 87    Temp 97.9 °F (36.6 °C)    Resp 16    Ht 5' 5\" (1.651 m)    Wt 64 kg (141 lb 1.5 oz)    SpO2 98%    BMI 23.48 kg/m2       No intake or output data in the 24 hours ending 06/17/18 1632     Physical Examination:             Constitutional:  No acute distress, cooperative, pleasant    ENT:  Oral mucous moist, oropharynx benign. Neck supple,    Resp:  Bilateral diminished breath sounds. No accessory muscle use   CV:  Regular rhythm, normal rate, no murmurs, gallops, rubs    GI:  Soft, non distended, non tender.  normoactive bowel sounds, no hepatosplenomegaly     Musculoskeletal:  No edema, warm, 2+ pulses throughout Neurologic:  Moves all extremities. AAOx3, CN II-XII reviewed     Skin:  Good turgor, no rashes or ulcers       Data Review:    Review and/or order of clinical lab test      Labs:     Recent Labs      06/16/18   2233   WBC  2.9*   HGB  9.2*   HCT  32.7*   PLT  164     Recent Labs      06/17/18   1500  06/16/18   2233   NA  139  140   K  4.3  4.0   CL  99  104   CO2  32  26   BUN  20  11   CREA  4.13*  3.08*   GLU  93  76   CA  8.5  7.6*     Recent Labs      06/16/18 2233   SGOT  60*   ALT  10*   AP  75   TBILI  0.3   TP  7.3   ALB  2.2*   GLOB  5.1*     No results for input(s): INR, PTP, APTT in the last 72 hours. No lab exists for component: INREXT   Recent Labs      06/17/18   1500   TIBC  137*   PSAT  25      Lab Results   Component Value Date/Time    Folate 4.1 (L) 03/15/2018 10:32 AM      No results for input(s): PH, PCO2, PO2 in the last 72 hours.   Recent Labs      06/16/18 2233   TROIQ  0.05*     Lab Results   Component Value Date/Time    Cholesterol, total 117 09/25/2015 02:02 PM    HDL Cholesterol 31 (L) 09/25/2015 02:02 PM    LDL, calculated 57 09/25/2015 02:02 PM    Triglyceride 146 09/25/2015 02:02 PM    CHOL/HDL Ratio 7.7 (H) 05/31/2009 10:30 AM     Lab Results   Component Value Date/Time    Glucose (POC) 123 (H) 04/20/2018 05:33 PM    Glucose (POC) 95 04/20/2018 11:19 AM    Glucose (POC) 100 04/20/2018 07:18 AM    Glucose (POC) 119 (H) 04/19/2018 09:11 PM    Glucose (POC) 86 04/19/2018 04:30 PM     Lab Results   Component Value Date/Time    Color YELLOW/STRAW 08/12/2016 09:06 PM    Appearance CLEAR 08/12/2016 09:06 PM    Specific gravity 1.017 08/12/2016 09:06 PM    Specific gravity 1.010 07/11/2016 12:44 PM    pH (UA) 7.0 08/12/2016 09:06 PM    Protein 300 (A) 08/12/2016 09:06 PM    Glucose NEGATIVE  08/12/2016 09:06 PM    Ketone TRACE (A) 08/12/2016 09:06 PM    Bilirubin NEGATIVE  07/11/2016 12:44 PM    Urobilinogen 1.0 08/12/2016 09:06 PM    Nitrites NEGATIVE  08/12/2016 09:06 PM Leukocyte Esterase NEGATIVE  08/12/2016 09:06 PM    Epithelial cells MODERATE (A) 08/12/2016 09:06 PM    Bacteria 1+ (A) 08/12/2016 09:06 PM    WBC 0-4 08/12/2016 09:06 PM    RBC 0-5 08/12/2016 09:06 PM         Medications Reviewed:     Current Facility-Administered Medications   Medication Dose Route Frequency    acetaminophen (TYLENOL) tablet 650 mg  650 mg Oral Q4H PRN    oxyCODONE-acetaminophen (PERCOCET) 5-325 mg per tablet 1 Tab  1 Tab Oral Q4H PRN    albuterol-ipratropium (DUO-NEB) 2.5 MG-0.5 MG/3 ML  3 mL Nebulization Q4H PRN    piperacillin-tazobactam (ZOSYN) 3.375 g in 0.9% sodium chloride (MBP/ADV) 100 mL  3.375 g IntraVENous Q12H    Vancomycin Pharmacy Dosing   Other Rx Dosing/Monitoring    allopurinol (ZYLOPRIM) tablet 100 mg  100 mg Oral DAILY AFTER BREAKFAST    amitriptyline (ELAVIL) tablet 25 mg  25 mg Oral QHS PRN    amLODIPine (NORVASC) tablet 10 mg  10 mg Oral DAILY    apixaban (ELIQUIS) tablet 5 mg  5 mg Oral Q12H    cyanocobalamin (VITAMIN B12) tablet 2,500 mcg  2,500 mcg Oral DAILY    gemfibrozil (LOPID) tablet 300 mg  300 mg Oral DAILY    hydroxychloroquine (PLAQUENIL) tablet 200 mg  200 mg Oral BID    pantoprazole (PROTONIX) tablet 40 mg  40 mg Oral ACB    polyethylene glycol (MIRALAX) packet 17 g  17 g Oral DAILY    predniSONE (DELTASONE) tablet 5 mg  5 mg Oral DAILY    senna (SENOKOT) tablet 8.6 mg  1 Tab Oral DAILY PRN    sodium chloride (NS) flush 5-10 mL  5-10 mL IntraVENous Q8H    sodium chloride (NS) flush 5-10 mL  5-10 mL IntraVENous PRN    ondansetron (ZOFRAN) injection 4 mg  4 mg IntraVENous Q4H PRN    fentaNYL citrate (PF) injection 25 mcg  25 mcg IntraVENous Q4H PRN    arformoterol (BROVANA) neb solution 15 mcg  15 mcg Nebulization BID RT    And    budesonide (PULMICORT) 500 mcg/2 ml nebulizer suspension  500 mcg Nebulization BID RT    hydrALAZINE (APRESOLINE) 20 mg/mL injection 10 mg  10 mg IntraVENous Q6H PRN    vancomycin (VANCOCIN) 500 mg in 0.9% sodium chloride (MBP/ADV) 100 mL  500 mg IntraVENous DIALYSIS PRN    carvedilol (COREG) tablet 25 mg  25 mg Oral BID WITH MEALS    losartan (COZAAR) tablet 50 mg  50 mg Oral DAILY    cloNIDine HCl (CATAPRES) tablet 0.1 mg  0.1 mg Oral BID    midazolam (VERSED) injection 1 mg  1 mg IntraVENous Multiple     ______________________________________________________________________  EXPECTED LENGTH OF STAY: - - -  ACTUAL LENGTH OF STAY:          Celina Sosa MD

## 2018-06-17 NOTE — ED TRIAGE NOTES
Patient presents to the emergency department reporting shortness of breath onset after dialysis today. Patient states she had her full dialysis today. Patient's O2 saturations in triage are 100%. Patient reports body aches, but no chest pain.

## 2018-06-17 NOTE — ED PROVIDER NOTES
HPI Comments: 29 y.o. female with past medical history significant for anemia, heart failure, carditis, XOL, SLE, asthma, peritoneal dialysis, ESRD, GERD, HTN, and C section who presents from home with chief complaint of SOB. The pt reports that she started to have SOB after finishing dialysis today around 1530. The pt reports that her dialysis was normal today it was the normal duration. The pt is unsure how much fluid was pulled off. The pt is on Abreva for asthma. The pt makes little urine. The pt denies fever, chills, cough, changes in BM, changes in urination, and LE edema. There are no other acute medical concerns at this time. Social hx: nonsmoker, no EtOH use  PCP: Panfilo Parada NP    Note written by Boy Browne, as dictated by Nick Lyons MD 9:15 PM      The history is provided by the patient. No  was used. Past Medical History:   Diagnosis Date    Anemia     secondary to lupus    Asthma     no inhaler use in past 2 to 3 years    Carditis     Chronic kidney disease     ESRD    Chronic pain     DDD (degenerative disc disease), lumbar     ESRD (end stage renal disease) (HCC)     GERD (gastroesophageal reflux disease)     Heart failure ([address]     Hemodialysis patient ([facility] [address] 2017    [address]  Tuesday,  Thursday,  and Saturday. 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     Poor historian 2018    With medications    Thromboembolus [facility]) 2013    lungs    Transfusion history     Last Transfusion 2017  at [facility]       Past Surgical History:   Procedure Laterality Date    HX  SECTION 11/2006    x1    HX OTHER SURGICAL  9/16/15    INSERTION PD CATH; Removed 12/2017    HX VASCULAR ACCESS Right 12/2017    Double-Lumen henry catheter upper chest         Family History:   Problem Relation Age of Onset    Diabetes Father     Hypertension Father     Cancer Other      aunt with breast cancer    Diabetes Mother        Social History     Social History    Marital status: SINGLE     Spouse name: N/A    Number of children: N/A    Years of education: N/A     Occupational History    Not on file. Social History Main Topics    Smoking status: Never Smoker    Smokeless tobacco: Never Used    Alcohol use No    Drug use: Yes     Special: Prescription, OTC    Sexual activity: Not Currently     Other Topics Concern     Service No    Blood Transfusions No    Caffeine Concern No    Occupational Exposure No    Hobby Hazards No    Sleep Concern No    Stress Concern No    Weight Concern No    Special Diet No    Back Care Yes     low back pain    Exercise No    Bike Helmet No    Seat Belt Yes    Self-Exams No     Social History Narrative    Lives with parents and daughter. ALLERGIES: Compazine [prochlorperazine edisylate]    Review of Systems   Constitutional: Negative for activity change, appetite change and fatigue. HENT: Negative for ear pain, facial swelling, sore throat and trouble swallowing. Eyes: Negative for pain, discharge and visual disturbance. Respiratory: Positive for shortness of breath. Negative for chest tightness and wheezing. Cardiovascular: Negative for chest pain and palpitations. Gastrointestinal: Negative for abdominal pain, blood in stool, nausea and vomiting. Genitourinary: Negative for difficulty urinating, flank pain and hematuria. Musculoskeletal: Negative for arthralgias, joint swelling, myalgias and neck pain. Skin: Negative for color change and rash. Neurological: Negative for dizziness, weakness, numbness and headaches. Hematological: Negative for adenopathy. Does not bruise/bleed easily. Psychiatric/Behavioral: Negative for behavioral problems, confusion and sleep disturbance. All other systems reviewed and are negative. Vitals:    06/16/18 2020   BP: (!) 169/122   Pulse: (!) 111   Resp: 18   Temp: 97.5 °F (36.4 °C)   SpO2: 100%   Weight: 62.1 kg (137 lb)   Height: 5' 5\" (1.651 m)            Physical Exam   Constitutional: She is oriented to person, place, and time. She appears well-developed and well-nourished. No distress. HENT:   Head: Normocephalic and atraumatic. Nose: Nose normal.   Mouth/Throat: Oropharynx is clear and moist.   Eyes: Conjunctivae and EOM are normal. Pupils are equal, round, and reactive to light. No scleral icterus. Neck: Normal range of motion. Neck supple. No JVD present. No tracheal deviation present. No thyromegaly present. No carotid bruits noted. Cardiovascular: Normal rate, regular rhythm, normal heart sounds and intact distal pulses. Exam reveals no gallop and no friction rub. No murmur heard. Pulmonary/Chest: Effort normal. No respiratory distress. She has wheezes (B/L). She has no rales. She exhibits no tenderness. Abdominal: Soft. Bowel sounds are normal. She exhibits no distension and no mass. There is no tenderness. There is no rebound and no guarding. Musculoskeletal: Normal range of motion. She exhibits no edema or tenderness. Lymphadenopathy:     She has no cervical adenopathy. Neurological: She is alert and oriented to person, place, and time. She has normal reflexes. No cranial nerve deficit. Coordination normal.   Skin: Skin is warm and dry. No rash noted. No erythema. Psychiatric: She has a normal mood and affect. Her behavior is normal. Judgment and thought content normal.   Nursing note and vitals reviewed.    Note written by Boy Brambila, as dictated by Bailee Roblero MD 9:16 PM      MDM  Number of Diagnoses or Management Options  Pneumonia of right middle lobe due to infectious organism [facility]): new and requires workup     Amount and/or Complexity of Data Reviewed  Clinical lab tests: ordered and reviewed  Tests in the radiology section of CPT®: ordered and reviewed  Decide to obtain previous medical records or to obtain history from someone other than the patient: yes  Review and summarize past medical records: yes  Discuss the patient with other providers: yes  Independent visualization of images, tracings, or specimens: yes    Risk of Complications, Morbidity, and/or Mortality  Presenting problems: high  Diagnostic procedures: high  Management options: high    Critical Care  Total time providing critical care: (Total critical care time spent exclusive of procedures: 45 minutes    )    Patient Progress  Patient progress: stable        ED Course       Procedures   Patient's x ray demonstrates a right middle lobe pneumonia. Antibiotics ordered. Patient will require admission. 10:48 PM  No blood work reported. 10:57 PM  Went over resulted labs with the pt. Lactate reported at 3.4. With tachycardia and low grade temp, code sepsis is called. Patient is already being treated with Zosyn and Levaquin with the DX of pneumonia. Patient's blood pressure has been running high, as she has not taken any of her regular medications since this am. She is unaware of the names of the medications. Review of her discharge list from 5/18 was done and blood pressure meds ordered on that basis. 12:17 AM  Dr. Nancy Yeager has returned call for admission. He will see.

## 2018-06-17 NOTE — PROGRESS NOTES
Day #1 of Zosyn  Indication:  sepsis  Current regimen:  2.25 grams q6h  Abx regimen: Vancomycin and Zosyn  Recent Labs      18   2233   WBC  2.9*   CREA  3.08*   BUN  11     Est CrCl: hemodialysis  Temp (24hrs), Av.5 °F (36.9 °C), Min:97.5 °F (36.4 °C), Max:99.5 °F (37.5 °C)    Cultures: blood    Plan: Change to 3.375 grams q12h

## 2018-06-17 NOTE — CONSULTS
[address]    Quinn Sosa.  MR#: [id number]  : 1985  ACCOUNT #: [de-identified]   DATE OF SERVICE: 2018    REASON FOR CONSULTATION:  End-stage renal disease. HISTORY OF PRESENT ILLNESS:  Unfortunately, the patient is in the PACU getting her central line inserted there. As per RN, she is a 81-year-old female from [place],   \[facility] [place] [facility]\"" schedule, and has a long stay in the hospital.  As per RN, patient was dialyzed yesterday and presented to the emergency room complaining of shortness of breath and there was a diagnosis on admission of pneumonia. She also has a history of lupus. Last dialysis was done yesterday as per nursing. PAST MEDICAL HISTORY:  Includes   1. End-stage renal disease, on hemodialysis, TTS at [facility] dialysis unit. 2.  Hypertension. 3.  Lupus. 4.  GI bleed. 5.  Asthma. 6.  Thromboembolism, on Eliquis. 7.  Dyslipidemia. ALLERGIES:  COMPAZINE. REVIEW OF SYSTEMS:  Unable to obtain in view of the patient in the PACU. PHYSICAL EXAMINATION:  Looking at her vital signs, it is 129/95, satting 98%. It is deferred. LABORATORY DATA:  Showed yesterday potassium 4, BUN 11, creatinine 3. There is no phosphorus or magnesium. Calcium 7.6. She had hemoglobin 9.2, WBC 2.9, and platelets 790 as of yesterday. MEDICATIONS AS INPATIENT:  Included Eliquis 5 mg twice a day, Brovana, amlodipine, allopurinol, Imuran, clonidine, Lopid, Plaquenil, Cozaar, Zosyn, prednisone, vancomycin. IMPRESSION:  1. End-stage renal disease, on hemodialysis TTS. 2.  Access patent as before. 3.  History of lupus, now notice that she is on Imuran and prednisone. 4.  Pneumonia. 5.  Leukopenia. 6.  Anemia. 7.  Recent long hospital setting. RECOMMENDATIONS:  1. Continue dialysis TTS. 2.  We will hold Imuran in view of her being leukopenic. 3.  Resume it when the level is better.   4.  We will try to see the patient whenever she is out of the PACU. LUIS Burdick MD       WGA / LN  D: 06/17/2018 14:37     T: 06/17/2018 15:26  JOB #: [id number]  CC: Castro Vick MD

## 2018-06-17 NOTE — CONSULTS
Thanks for the consult  A/P:ESRD TTS at [facility] yesterday as per RN        PNA        Lupus         Leukopenia (on imuran)  Hold imuran(leukopenia,PNA)  recom eliquis 2.5 mg BID  HD TTS  Currently she is in PACU

## 2018-06-18 ENCOUNTER — TELEPHONE (OUTPATIENT)
Dept: FAMILY MEDICINE CLINIC | Age: 33
End: 2018-06-18

## 2018-06-18 ENCOUNTER — HOME CARE VISIT (OUTPATIENT)
Dept: HOME HEALTH SERVICES | Facility: HOME HEALTH | Age: 33
End: 2018-06-18
Payer: MEDICARE

## 2018-06-18 LAB
ANION GAP SERPL CALC-SCNC: 9 MMOL/L (ref 5–15)
ATRIAL RATE: 109 BPM
BACTERIA SPEC CULT: NORMAL
BUN SERPL-MCNC: 27 MG/DL (ref 6–20)
BUN/CREAT SERPL: 6 (ref 12–20)
CALCIUM SERPL-MCNC: 8.4 MG/DL (ref 8.5–10.1)
CALCULATED P AXIS, ECG09: 41 DEGREES
CALCULATED R AXIS, ECG10: 61 DEGREES
CALCULATED T AXIS, ECG11: -5 DEGREES
CHLORIDE SERPL-SCNC: 100 MMOL/L (ref 97–108)
CK MB CFR SERPL CALC: ABNORMAL % (ref 0–2.5)
CK MB SERPL-MCNC: <1 NG/ML (ref 5–25)
CK SERPL-CCNC: 25 U/L (ref 26–192)
CO2 SERPL-SCNC: 29 MMOL/L (ref 21–32)
CREAT SERPL-MCNC: 4.7 MG/DL (ref 0.55–1.02)
DIAGNOSIS, 93000: NORMAL
GLUCOSE SERPL-MCNC: 105 MG/DL (ref 65–100)
MAGNESIUM SERPL-MCNC: 1.6 MG/DL (ref 1.6–2.4)
P-R INTERVAL, ECG05: 114 MS
PHOSPHATE SERPL-MCNC: 2.8 MG/DL (ref 2.6–4.7)
POTASSIUM SERPL-SCNC: 4.4 MMOL/L (ref 3.5–5.1)
Q-T INTERVAL, ECG07: 362 MS
QRS DURATION, ECG06: 74 MS
QTC CALCULATION (BEZET), ECG08: 487 MS
SERVICE CMNT-IMP: NORMAL
SODIUM SERPL-SCNC: 138 MMOL/L (ref 136–145)
VENTRICULAR RATE, ECG03: 109 BPM

## 2018-06-18 PROCEDURE — 74011250636 HC RX REV CODE- 250/636: Performed by: INTERNAL MEDICINE

## 2018-06-18 PROCEDURE — 77010033678 HC OXYGEN DAILY

## 2018-06-18 PROCEDURE — 82550 ASSAY OF CK (CPK): CPT | Performed by: INTERNAL MEDICINE

## 2018-06-18 PROCEDURE — 87040 BLOOD CULTURE FOR BACTERIA: CPT | Performed by: HOSPITALIST

## 2018-06-18 PROCEDURE — 84100 ASSAY OF PHOSPHORUS: CPT | Performed by: INTERNAL MEDICINE

## 2018-06-18 PROCEDURE — 94664 DEMO&/EVAL PT USE INHALER: CPT

## 2018-06-18 PROCEDURE — 65270000029 HC RM PRIVATE

## 2018-06-18 PROCEDURE — 74011636637 HC RX REV CODE- 636/637: Performed by: INTERNAL MEDICINE

## 2018-06-18 PROCEDURE — 80048 BASIC METABOLIC PNL TOTAL CA: CPT | Performed by: INTERNAL MEDICINE

## 2018-06-18 PROCEDURE — 74011000258 HC RX REV CODE- 258: Performed by: INTERNAL MEDICINE

## 2018-06-18 PROCEDURE — 74011250637 HC RX REV CODE- 250/637: Performed by: HOSPITALIST

## 2018-06-18 PROCEDURE — 74011250637 HC RX REV CODE- 250/637: Performed by: SPECIALIST

## 2018-06-18 PROCEDURE — 3331090002 HH PPS REVENUE DEBIT

## 2018-06-18 PROCEDURE — 83735 ASSAY OF MAGNESIUM: CPT | Performed by: INTERNAL MEDICINE

## 2018-06-18 PROCEDURE — 3331090001 HH PPS REVENUE CREDIT

## 2018-06-18 PROCEDURE — 94640 AIRWAY INHALATION TREATMENT: CPT

## 2018-06-18 PROCEDURE — 36415 COLL VENOUS BLD VENIPUNCTURE: CPT | Performed by: INTERNAL MEDICINE

## 2018-06-18 PROCEDURE — 74011000250 HC RX REV CODE- 250: Performed by: INTERNAL MEDICINE

## 2018-06-18 PROCEDURE — 74011250637 HC RX REV CODE- 250/637: Performed by: INTERNAL MEDICINE

## 2018-06-18 RX ADMIN — ARFORMOTEROL TARTRATE 15 MCG: 15 SOLUTION RESPIRATORY (INHALATION) at 07:58

## 2018-06-18 RX ADMIN — ARFORMOTEROL TARTRATE 15 MCG: 15 SOLUTION RESPIRATORY (INHALATION) at 21:35

## 2018-06-18 RX ADMIN — CLONIDINE HYDROCHLORIDE 0.1 MG: 0.1 TABLET ORAL at 18:35

## 2018-06-18 RX ADMIN — PANTOPRAZOLE SODIUM 40 MG: 40 TABLET, DELAYED RELEASE ORAL at 05:20

## 2018-06-18 RX ADMIN — Medication 10 ML: at 21:20

## 2018-06-18 RX ADMIN — OXYCODONE AND ACETAMINOPHEN 1 TABLET: 5; 325 TABLET ORAL at 05:20

## 2018-06-18 RX ADMIN — LOSARTAN POTASSIUM 50 MG: 50 TABLET ORAL at 09:02

## 2018-06-18 RX ADMIN — APIXABAN 2.5 MG: 2.5 TABLET, FILM COATED ORAL at 21:19

## 2018-06-18 RX ADMIN — HYDROXYCHLOROQUINE SULFATE 200 MG: 200 TABLET, FILM COATED ORAL at 09:01

## 2018-06-18 RX ADMIN — FENTANYL CITRATE 25 MCG: 50 INJECTION, SOLUTION INTRAMUSCULAR; INTRAVENOUS at 07:13

## 2018-06-18 RX ADMIN — PIPERACILLIN SODIUM,TAZOBACTAM SODIUM 3.38 G: 3; .375 INJECTION, POWDER, FOR SOLUTION INTRAVENOUS at 21:20

## 2018-06-18 RX ADMIN — BUDESONIDE 500 MCG: 0.5 INHALANT RESPIRATORY (INHALATION) at 21:35

## 2018-06-18 RX ADMIN — GEMFIBROZIL 300 MG: 600 TABLET ORAL at 09:01

## 2018-06-18 RX ADMIN — PREDNISONE 5 MG: 5 TABLET ORAL at 09:01

## 2018-06-18 RX ADMIN — CLONIDINE HYDROCHLORIDE 0.1 MG: 0.1 TABLET ORAL at 09:01

## 2018-06-18 RX ADMIN — CARVEDILOL 25 MG: 12.5 TABLET, FILM COATED ORAL at 18:35

## 2018-06-18 RX ADMIN — OXYCODONE AND ACETAMINOPHEN 1 TABLET: 5; 325 TABLET ORAL at 01:17

## 2018-06-18 RX ADMIN — AMLODIPINE BESYLATE 10 MG: 5 TABLET ORAL at 09:02

## 2018-06-18 RX ADMIN — BUDESONIDE 500 MCG: 0.5 INHALANT RESPIRATORY (INHALATION) at 07:58

## 2018-06-18 RX ADMIN — OXYCODONE AND ACETAMINOPHEN 1 TABLET: 5; 325 TABLET ORAL at 15:09

## 2018-06-18 RX ADMIN — APIXABAN 2.5 MG: 2.5 TABLET, FILM COATED ORAL at 09:01

## 2018-06-18 RX ADMIN — FENTANYL CITRATE 25 MCG: 50 INJECTION, SOLUTION INTRAMUSCULAR; INTRAVENOUS at 21:21

## 2018-06-18 RX ADMIN — ALLOPURINOL 100 MG: 100 TABLET ORAL at 09:01

## 2018-06-18 RX ADMIN — Medication 10 ML: at 05:20

## 2018-06-18 RX ADMIN — CARVEDILOL 25 MG: 12.5 TABLET, FILM COATED ORAL at 09:01

## 2018-06-18 RX ADMIN — FENTANYL CITRATE 25 MCG: 50 INJECTION, SOLUTION INTRAMUSCULAR; INTRAVENOUS at 12:38

## 2018-06-18 RX ADMIN — Medication 2500 MCG: at 09:01

## 2018-06-18 RX ADMIN — OXYCODONE AND ACETAMINOPHEN 1 TABLET: 5; 325 TABLET ORAL at 19:16

## 2018-06-18 RX ADMIN — FENTANYL CITRATE 25 MCG: 50 INJECTION, SOLUTION INTRAMUSCULAR; INTRAVENOUS at 01:17

## 2018-06-18 RX ADMIN — HYDROXYCHLOROQUINE SULFATE 200 MG: 200 TABLET, FILM COATED ORAL at 18:35

## 2018-06-18 RX ADMIN — PIPERACILLIN SODIUM,TAZOBACTAM SODIUM 3.38 G: 3; .375 INJECTION, POWDER, FOR SOLUTION INTRAVENOUS at 09:01

## 2018-06-18 NOTE — PROGRESS NOTES
NAME: Adriano Larios        :  1985        MRN:  [id number]        Assessment :    Plan:  - End-stage renal disease - TTS - [facility]     SLE    Anemia    PNA     --No acute need for HD today. Plan HD in AM.    Start EPO. Subjective:     Chief Complaint:  \" I feel much better. \"  No dyspnea. No cough. No N/V. No HA. Review of Systems:    Symptom Y/N Comments  Symptom Y/N Comments   Fever/Chills    Chest Pain     Poor Appetite    Edema     Cough    Abdominal Pain     Sputum    Joint Pain     SOB/IVY    Pruritis/Rash     Nausea/vomit    Tolerating PT/OT     Diarrhea    Tolerating Diet     Constipation    Other       Could not obtain due to:      Objective:     VITALS:   Last 24hrs VS reviewed since prior progress note.  Most recent are:  Visit Vitals    BP (!) 138/98 (BP 1 Location: Left arm, BP Patient Position: At rest;Lying right side)    Pulse 81    Temp 97.9 °F (36.6 °C)    Resp 24    Ht 5' 5\" (1.651 m)    Wt 64 kg (141 lb 1.5 oz)    SpO2 96%    BMI 23.48 kg/m2       Intake/Output Summary (Last 24 hours) at 18 1127  Last data filed at 18 0800   Gross per 24 hour   Intake              260 ml   Output               55 ml   Net              205 ml      Telemetry Reviewed:     PHYSICAL EXAM:  General: NAD  CTA  No edema      Lab Data Reviewed: (see below)    Medications Reviewed: (see below)    PMH/SH reviewed - no change compared to H&P  ________________________________________________________________________  Care Plan discussed with:  Patient     Family      RN     Care Manager                    Consultant:          Comments   >50% of visit spent in counseling and coordination of care       ________________________________________________________________________  Conchis Jung MD     Procedures: see electronic medical records for all procedures/Xrays and details which  were not copied into this note but were reviewed prior to creation of Plan. LABS:  Recent Labs      06/17/18   1500  06/16/18   2233   WBC  3.2*  2.9*   HGB  8.6*  9.2*   HCT  29.2*  32.7*   PLT  148*  164     Recent Labs      06/18/18   0210  06/17/18   1500  06/16/18 2233   NA  138  139  140   K  4.4  4.3  4.0   CL  100  99  104   CO2  29  32  26   BUN  27*  20  11   CREA  4.70*  4.13*  3.08*   GLU  105*  93  76   CA  8.4*  8.5  7.6*   MG  1.6  1.7   --    PHOS  2.8  3.0   --      Recent Labs      06/17/18   1500  06/16/18   2233   SGOT   --   60*   AP   --   75   TP   --   7.3   ALB   --   2.2*   GLOB   --   5.1*   AML  112   --    LPSE  88   --      No results for input(s): INR, PTP, APTT in the last 72 hours. No lab exists for component: INREXT   Recent Labs      06/17/18   1500   TIBC  137*   PSAT  25   FERR  4182*      Lab Results   Component Value Date/Time    Folate 13.7 06/17/2018 03:00 PM      No results for input(s): PH, PCO2, PO2 in the last 72 hours.   Recent Labs      06/18/18   0210  06/17/18   1500   CPK  25*  22*   CKMB  <1.0  <1.0     No components found for: [place]  Lab Results   Component Value Date/Time    Color YELLOW/STRAW 08/12/2016 09:06 PM    Appearance CLEAR 08/12/2016 09:06 PM    Specific gravity 1.017 08/12/2016 09:06 PM    Specific gravity 1.010 07/11/2016 12:44 PM    pH (UA) 7.0 08/12/2016 09:06 PM    Protein 300 (A) 08/12/2016 09:06 PM    Glucose NEGATIVE  08/12/2016 09:06 PM    Ketone TRACE (A) 08/12/2016 09:06 PM    Bilirubin NEGATIVE  07/11/2016 12:44 PM    Urobilinogen 1.0 08/12/2016 09:06 PM    Nitrites NEGATIVE  08/12/2016 09:06 PM    Leukocyte Esterase NEGATIVE  08/12/2016 09:06 PM    Epithelial cells MODERATE (A) 08/12/2016 09:06 PM    Bacteria 1+ (A) 08/12/2016 09:06 PM    WBC 0-4 08/12/2016 09:06 PM    RBC 0-5 08/12/2016 09:06 PM       MEDICATIONS:  Current Facility-Administered Medications   Medication Dose Route Frequency    acetaminophen (TYLENOL) tablet 650 mg  650 mg Oral Q4H PRN    oxyCODONE-acetaminophen (PERCOCET) 5-325 mg per tablet 1 Tab  1 Tab Oral Q4H PRN    albuterol-ipratropium (DUO-NEB) 2.5 MG-0.5 MG/3 ML  3 mL Nebulization Q4H PRN    piperacillin-tazobactam (ZOSYN) 3.375 g in 0.9% sodium chloride (MBP/ADV) 100 mL  3.375 g IntraVENous Q12H    Vancomycin Pharmacy Dosing   Other Rx Dosing/Monitoring    allopurinol (ZYLOPRIM) tablet 100 mg  100 mg Oral DAILY AFTER BREAKFAST    amitriptyline (ELAVIL) tablet 25 mg  25 mg Oral QHS PRN    amLODIPine (NORVASC) tablet 10 mg  10 mg Oral DAILY    cyanocobalamin (VITAMIN B12) tablet 2,500 mcg  2,500 mcg Oral DAILY    gemfibrozil (LOPID) tablet 300 mg  300 mg Oral DAILY    hydroxychloroquine (PLAQUENIL) tablet 200 mg  200 mg Oral BID    pantoprazole (PROTONIX) tablet 40 mg  40 mg Oral ACB    polyethylene glycol (MIRALAX) packet 17 g  17 g Oral DAILY    predniSONE (DELTASONE) tablet 5 mg  5 mg Oral DAILY    senna (SENOKOT) tablet 8.6 mg  1 Tab Oral DAILY PRN    sodium chloride (NS) flush 5-10 mL  5-10 mL IntraVENous Q8H    sodium chloride (NS) flush 5-10 mL  5-10 mL IntraVENous PRN    ondansetron (ZOFRAN) injection 4 mg  4 mg IntraVENous Q4H PRN    fentaNYL citrate (PF) injection 25 mcg  25 mcg IntraVENous Q4H PRN    arformoterol (BROVANA) neb solution 15 mcg  15 mcg Nebulization BID RT    And    budesonide (PULMICORT) 500 mcg/2 ml nebulizer suspension  500 mcg Nebulization BID RT    hydrALAZINE (APRESOLINE) 20 mg/mL injection 10 mg  10 mg IntraVENous Q6H PRN    vancomycin (VANCOCIN) 500 mg in 0.9% sodium chloride (MBP/ADV) 100 mL  500 mg IntraVENous DIALYSIS PRN    carvedilol (COREG) tablet 25 mg  25 mg Oral BID WITH MEALS    losartan (COZAAR) tablet 50 mg  50 mg Oral DAILY    cloNIDine HCl (CATAPRES) tablet 0.1 mg  0.1 mg Oral BID    midazolam (VERSED) injection 1 mg  1 mg IntraVENous Multiple    apixaban (ELIQUIS) tablet 2.5 mg  2.5 mg Oral Q12H

## 2018-06-18 NOTE — PROGRESS NOTES
Hospitalist Progress Note  Velma Elam MD  Answering service: [phone] OR [phone] from in house phone  Cell: [phone]      Date of Service:  2018  NAME:  Rhett Kim  :  1985  MRN:  [id number]      Admission Summary: This is a 71-year-old woman with a past medical history significant for end-stage renal disease on hemodialysis, lupus, asthma, thromboembolism, dyslipidemia, was in her usual state of health until the day of presentation at the emergency room when the patient developed shortness of breath. The shortness of breath is progressive, it is with little or no activity. Patient had routine dialysis done. After the dialysis, that is when the patient developed the shortness of breath. Patient used to be on peritoneal dialysis, but was converted to hemodialysis because of recurrent infection. The patient was last admitted to this hospital from 2018 to 2018. She was admitted and treated for sepsis, attributed to multiple abdominal abscesses. Patient underwent drainage of the abdominal abscess. Patient also underwent drainage of a right pleural effusion. She was discharged home to complete a course of Augmentin. She stated that she has been having shortness of breath on and off since she was discharged from hospital.  A day before coming to the emergency room here, she was seen at another emergency room. Patient was evaluated, but was not admitted. When the patient arrived at the emergency room, she was found to have elevated lactic acid level, low grade temperature, tachycardia. Code sepsis was called.   The chest x-ray shows evidence of pneumonia  Similar presentation last admission as well    Interval history / Subjective:     F/u Pneumonia   Feeling much better than yesterday  No chest pain, headache  Assessment & Plan:     Health care associated Pneumonia with partially loculated pleural effusion (?recurrent)  -CXR 6/17 Unchanged mild right pleural effusion with patchy opacification right base  -Vanc/Zosyn  -Follow cultures  -CT chest/abd 6/17 The pleural fluid on the right extends more superiorly in the right hemithorax and there is a more focal collection laterally measuring 5.4 cm may be partially loculated.  -Last admission patient had US guided removal with chest tube placed  -Will consult thoracic surgery and ID    Abdominal cavity abscess (last admission)  -CT abd 6/17 Pigtail catheter overlies collection in the pelvis anteriorly which has diminished considerably in size since 5/16/2018 and there is a small amount of residual fluid noted  -Will inform Dr Saul Yost (the patient follows Dr Saul Yost)    Suspected sepsis  -as above  -blood culture 6/16 GPC in chains 1/2  -Repeat blood culture tomorrow    ESRD  -on HD (TTS)  -Appreciate Nephrology    Anemia  -likely of chronic disease    Lupus  -Outpatient being followed by Dr Aspen Villa  -hold imuran     Chronic DVT  -on Eliquis  -Chenged the dose of eliquis per nephrology rec    Elevated troponin  -unremarkable  -Appreciate discussion with Cardiology  -Echo EF 60-65% with no RWMA    Dyslipidemia  -continue home meds    Renal diet    Code status: FULL CODE  DVT prophylaxis: Eliquis  PTA: home    Plan: Continue antibiotics, follow cultures, Follow ID, thoracic surgery    Care Plan discussed with: Patient/Family  Disposition: TBD     Hospital Problems  Date Reviewed: 6/17/2018          Codes Class Noted POA    Troponin level elevated ICD-10-CM: R74.8  ICD-9-CM: 790.6  6/17/2018 Unknown        * (Principal)Sepsis ([facility] [address] ICD-10-CM: A41.9  ICD-9-CM: 038.9, 995.91  4/29/2018 Yes                Review of Systems:   A comprehensive review of systems was negative except for that written in the HPI. Vital Signs:    Last 24hrs VS reviewed since prior progress note.  Most recent are:  Visit Vitals    BP (!) 128/94    Pulse 78    Temp 97.9 °F (36.6 °C)    Resp 24    Ht 5' 5\" (1.651 m)    Wt 67.7 kg (149 lb 4 oz)    SpO2 96%    BMI 24.84 kg/m2         Intake/Output Summary (Last 24 hours) at 06/18/18 1549  Last data filed at 06/18/18 1200   Gross per 24 hour   Intake              500 ml   Output               55 ml   Net              445 ml        Physical Examination:             Constitutional:  No acute distress, cooperative, pleasant    ENT:  Oral mucous moist, oropharynx benign. Neck supple,    Resp:  Bilateral diminished breath sounds. No accessory muscle use   CV:  Regular rhythm, normal rate, no murmurs, gallops, rubs    GI:  Soft, non distended, non tender. normoactive bowel sounds, no hepatosplenomegaly, drain in place    Musculoskeletal:  No edema, warm, 2+ pulses throughout    Neurologic:  Moves all extremities. AAOx3, CN II-XII reviewed     Skin:  Good turgor, no rashes or ulcers       Data Review:    Review and/or order of clinical lab test      Labs:     Recent Labs      06/17/18   1500  06/16/18   2233   WBC  3.2*  2.9*   HGB  8.6*  9.2*   HCT  29.2*  32.7*   PLT  148*  164     Recent Labs      06/18/18   0210  06/17/18   1500  06/16/18   2233   NA  138  139  140   K  4.4  4.3  4.0   CL  100  99  104   CO2  29  32  26   BUN  27*  20  11   CREA  4.70*  4.13*  3.08*   GLU  105*  93  76   CA  8.4*  8.5  7.6*   MG  1.6  1.7   --    PHOS  2.8  3.0   --      Recent Labs      06/17/18   1500  06/16/18   2233   SGOT   --   60*   ALT   --   10*   AP   --   75   TBILI   --   0.3   TP   --   7.3   ALB   --   2.2*   GLOB   --   5.1*   AML  112   --    LPSE  88   --      No results for input(s): INR, PTP, APTT in the last 72 hours. No lab exists for component: INREXT, INREXT   Recent Labs      06/17/18   1500   TIBC  137*   PSAT  25   FERR  4182*      Lab Results   Component Value Date/Time    Folate 13.7 06/17/2018 03:00 PM      No results for input(s): PH, PCO2, PO2 in the last 72 hours.   Recent Labs      06/18/18   0210  06/17/18   1500  06/16/18   2233   CPK  25*  22* --    CKNDX  Cannot be calculated  Cannot be calculated   --    TROIQ   --   0.06*  0.05*     Lab Results   Component Value Date/Time    Cholesterol, total 117 09/25/2015 02:02 PM    HDL Cholesterol 31 (L) 09/25/2015 02:02 PM    LDL, calculated 57 09/25/2015 02:02 PM    Triglyceride 146 09/25/2015 02:02 PM    CHOL/HDL Ratio 7.7 (H) 05/31/2009 10:30 AM     Lab Results   Component Value Date/Time    Glucose (POC) 123 (H) 04/20/2018 05:33 PM    Glucose (POC) 95 04/20/2018 11:19 AM    Glucose (POC) 100 04/20/2018 07:18 AM    Glucose (POC) 119 (H) 04/19/2018 09:11 PM    Glucose (POC) 86 04/19/2018 04:30 PM     Lab Results   Component Value Date/Time    Color YELLOW/STRAW 08/12/2016 09:06 PM    Appearance CLEAR 08/12/2016 09:06 PM    Specific gravity 1.017 08/12/2016 09:06 PM    Specific gravity 1.010 07/11/2016 12:44 PM    pH (UA) 7.0 08/12/2016 09:06 PM    Protein 300 (A) 08/12/2016 09:06 PM    Glucose NEGATIVE  08/12/2016 09:06 PM    Ketone TRACE (A) 08/12/2016 09:06 PM    Bilirubin NEGATIVE  07/11/2016 12:44 PM    Urobilinogen 1.0 08/12/2016 09:06 PM    Nitrites NEGATIVE  08/12/2016 09:06 PM    Leukocyte Esterase NEGATIVE  08/12/2016 09:06 PM    Epithelial cells MODERATE (A) 08/12/2016 09:06 PM    Bacteria 1+ (A) 08/12/2016 09:06 PM    WBC 0-4 08/12/2016 09:06 PM    RBC 0-5 08/12/2016 09:06 PM         Medications Reviewed:     Current Facility-Administered Medications   Medication Dose Route Frequency    [START ON 6/19/2018] epoetin pia (EPOGEN;PROCRIT) injection 8,000 Units  8,000 Units SubCUTAneous DIALYSIS TUE, THU & SAT    [START ON 6/19/2018] vancomycin (VANCOCIN) 500 mg in 0.9% sodium chloride (MBP/ADV) 100 mL  500 mg IntraVENous DIALYSIS TUE, THU & SAT    acetaminophen (TYLENOL) tablet 650 mg  650 mg Oral Q4H PRN    oxyCODONE-acetaminophen (PERCOCET) 5-325 mg per tablet 1 Tab  1 Tab Oral Q4H PRN    albuterol-ipratropium (DUO-NEB) 2.5 MG-0.5 MG/3 ML  3 mL Nebulization Q4H PRN    piperacillin-tazobactam (ZOSYN) 3.375 g in 0.9% sodium chloride (MBP/ADV) 100 mL  3.375 g IntraVENous Q12H    Vancomycin Pharmacy Dosing   Other Rx Dosing/Monitoring    allopurinol (ZYLOPRIM) tablet 100 mg  100 mg Oral DAILY AFTER BREAKFAST    amitriptyline (ELAVIL) tablet 25 mg  25 mg Oral QHS PRN    amLODIPine (NORVASC) tablet 10 mg  10 mg Oral DAILY    cyanocobalamin (VITAMIN B12) tablet 2,500 mcg  2,500 mcg Oral DAILY    gemfibrozil (LOPID) tablet 300 mg  300 mg Oral DAILY    hydroxychloroquine (PLAQUENIL) tablet 200 mg  200 mg Oral BID    pantoprazole (PROTONIX) tablet 40 mg  40 mg Oral ACB    polyethylene glycol (MIRALAX) packet 17 g  17 g Oral DAILY    predniSONE (DELTASONE) tablet 5 mg  5 mg Oral DAILY    senna (SENOKOT) tablet 8.6 mg  1 Tab Oral DAILY PRN    sodium chloride (NS) flush 5-10 mL  5-10 mL IntraVENous Q8H    sodium chloride (NS) flush 5-10 mL  5-10 mL IntraVENous PRN    ondansetron (ZOFRAN) injection 4 mg  4 mg IntraVENous Q4H PRN    fentaNYL citrate (PF) injection 25 mcg  25 mcg IntraVENous Q4H PRN    arformoterol (BROVANA) neb solution 15 mcg  15 mcg Nebulization BID RT    And    budesonide (PULMICORT) 500 mcg/2 ml nebulizer suspension  500 mcg Nebulization BID RT    hydrALAZINE (APRESOLINE) 20 mg/mL injection 10 mg  10 mg IntraVENous Q6H PRN    carvedilol (COREG) tablet 25 mg  25 mg Oral BID WITH MEALS    losartan (COZAAR) tablet 50 mg  50 mg Oral DAILY    cloNIDine HCl (CATAPRES) tablet 0.1 mg  0.1 mg Oral BID    midazolam (VERSED) injection 1 mg  1 mg IntraVENous Multiple    apixaban (ELIQUIS) tablet 2.5 mg  2.5 mg Oral Q12H     ______________________________________________________________________  EXPECTED LENGTH OF STAY: 4d 21h  ACTUAL LENGTH OF STAY:          1                 Andrade Shah MD

## 2018-06-18 NOTE — PROGRESS NOTES
1600 - TRANSFER - OUT REPORT:    Verbal report given to DANITZA Pinzon(name) on Stew Jin  being transferred to [address] (unit) for routine progression of care       Report consisted of patients Situation, Background, Assessment and   Recommendations(SBAR). Information from the following report(s) SBAR, Kardex, ED Summary, Procedure Summary, Intake/Output, MAR, Recent Results and Med Rec Status was reviewed with the receiving nurse. Opportunity for questions and clarification was provided.     q1h rounds completed during shift

## 2018-06-18 NOTE — CDMP QUERY
Thank you for documenting the diagnosis of \"history of DVT\" for this patient. Based on the coding definitions listed below, could you clarify if your patient's DVT be further specified as one of the following below. Please document your response indicating your clinical opinion in your progress notes and discharge summary. Acute DVT:  \"Acute\" defines the period of time beginning with the initial diagnosis, up to and including the entire period of time where anticoagulation is instituted (3-12 months)     Acute Recurrent DVT: \"Recurrent\" incorporates the definition of acute with the diagnosis of recurrent and ends 3-12 months beyond that time. These patients will likely require lifelong anticoagulation therapy. Chronic DVT:  \"Chronic\" treatment period extending beyond initial 12 months of treatment. Medical condition still exists and is actively being treated.     Personal History of DVT: explains the patient's past medical condition that no longer exists, and is not receiving any treatment, but that has the potential for recurrence, and therefore may require monitoring    Thank you,    Iman Faith RN, BSN, [facility], [facility]  [phone]

## 2018-06-18 NOTE — TELEPHONE ENCOUNTER
Uzair cochran/ [facility]. [phone]. She sent over an order on 6/12/18 for Resume Care  For PT Visit and hadn't heard anything back.

## 2018-06-18 NOTE — PROGRESS NOTES
Reason for Admission:   Shortness of breath following dialysis. RRAT Score:     23             Resources/supports as identified by patient/family:   Patient is covered by Medicare A/B as well as Medicaid. She receives disability monthly. Her PCP is Dr Oskar Mendieta. Top Challenges facing patient (as identified by patient/family and CM): Finances/Medication cost?      See above              Transportation? Transportation provided by there parents, whom she lives with. Support system or lack thereof? Mother and father are primary support system and she also has caregivers at home. Living arrangements? Lives in house with her parents. Has caregivers to assist with ADLs. Self-care/ADLs/Cognition? Alert/oriented x4. Current Advanced Directive/Advance Care Plan:  None. Patient is not interested at this time. Plan for utilizing home health:    Patient currently open to [facility] for SN/PT/OT. Will need resumption orders at discharge. Likelihood of readmission:   High risk/Red zone                 Transition of Care Plan:        Anticipate discharge to home, transported by her parents. Will resume [facility] services if needed. Care Management Interventions  PCP Verified by CM:  Yes (Dr Oskar Mendieta)  Palliative Care Criteria Met (RRAT>21 & CHF Dx)?: No  Mode of Transport at Discharge:  (Family car)  Transition of Care Consult (CM Consult): Home Health (Pt open to [facility] for SN/PT/OT)  [address]: Yes  Current Support Network:  (LIves with famiily)  Confirm Follow Up Transport: Self  Plan discussed with Pt/Family/Caregiver: Yes  Freedom of Choice Offered: Yes  [place] Resource Information Provided?:  (NA)  Discharge Location  Discharge Placement: Home with home health    Maranda Friedman RN CRM  Ext [phone]

## 2018-06-18 NOTE — PROGRESS NOTES
TRANSFER - IN REPORT:    Verbal report received from Gokul RN(name) on Stew Jin  being received from [facility](unit) for routine progression of care      Report consisted of patients Situation, Background, Assessment and   Recommendations(SBAR). Information from the following report(s) SBAR, MAR, Recent Results and Med Rec Status was reviewed with the receiving nurse. Opportunity for questions and clarification was provided. Assessment completed upon patients arrival to unit and care assumed.

## 2018-06-18 NOTE — PROGRESS NOTES
Cardiology Progress Note            Admit Date: 6/16/2018  Admit Diagnosis: Sepsis ([address]  poor venous access  Date: 6/18/2018     Time: 8:31 AM    HPI:    Sabrina Obregon is a 29 y.o. female who presented 6/16/2018 with complaints of SOB and pneumonia. Symptoms began approximately 7 days ago. She has no history of cardiac disease including CAD, MI, CHF and Atrial Fib. She has lupus and ESRD on hemodialysis, used to be on PD but had multiple abdominal abscesses. Examination by the attending physician suggested the possibility of CAD due to borderline troponin of 0.05. Cardiology has been consulted to assist in the management of this patient    Subjective:   Sabrina Obregon denies chest pain, palpitations. Breathing significantly improved. Assessment and Plan     1. Elevated troponin: troponin flat at 0.06  - trivial elevation in setting of ESRD/HD and HTN. Not NSTEMI. -TTE:  EF 60-65%, NWMA  RV mildly dilated, RA dilated. Moderate MR, grade II DD  -12 lead EKG: NSR, nonspecific twave abnormality. 2. Hx of HTN:  BP elevated this a.m.  -Continue coreg, amlodipine, losartan, clonidine.    -Further management per renal/primary teams    3. ESRD on HD  -per nephrology    4. Pneumonia    5. Chronic anemia: hgb 8.6    6. Moderate MR: noted on TTE. Further recs per Dr. Danilo Bedolla. Cardiology Attending:Patient seen and examined. I agree with NP assessment and plans. LV OK by echo, BP improving, will sign off.     Vira Tinoco MD 6/18/2018 11:48 AM       Objective:      Physical Exam:                Visit Vitals    /90 (BP 1 Location: Left arm, BP Patient Position: At rest;Supine)    Pulse 85    Temp 98.3 °F (36.8 °C)    Resp 18    Ht 5' 5\" (1.651 m)    Wt 64 kg (141 lb 1.5 oz)    SpO2 99%    BMI 23.48 kg/m2          General Appearance:   Well developed, well nourished,alert and oriented x 3, and   individual in no acute distress. Ears/Nose/Mouth/Throat:    Hearing grossly normal.         Neck:  Supple. Chest:    Lungs few right basilar crackles otherwise clear to auscultation bilaterally. Cardiovascular:   Regular rate and rhythm, S1, S2 normal, no murmur. Abdomen:    Soft, non-tender, bowel sounds are present. Extremities:  No edema bilaterally. Skin:  Warm and dry. Telemetry: normal sinus rhythm          Data Review:    Labs:    Recent Results (from the past 24 hour(s))   METABOLIC PANEL, BASIC    Collection Time: 06/17/18  3:00 PM   Result Value Ref Range    Sodium 139 136 - 145 mmol/L    Potassium 4.3 3.5 - 5.1 mmol/L    Chloride 99 97 - 108 mmol/L    CO2 32 21 - 32 mmol/L    Anion gap 8 5 - 15 mmol/L    Glucose 93 65 - 100 mg/dL    BUN 20 6 - 20 MG/DL    Creatinine 4.13 (H) 0.55 - 1.02 MG/DL    BUN/Creatinine ratio 5 (L) 12 - 20      GFR est AA 15 (L) >60 ml/min/1.73m2    GFR est non-AA 13 (L) >60 ml/min/1.73m2    Calcium 8.5 8.5 - 10.1 MG/DL   CBC WITH AUTOMATED DIFF    Collection Time: 06/17/18  3:00 PM   Result Value Ref Range    WBC 3.2 (L) 3.6 - 11.0 K/uL    RBC 3.25 (L) 3.80 - 5.20 M/uL    HGB 8.6 (L) 11.5 - 16.0 g/dL    HCT 29.2 (L) 35.0 - 47.0 %    MCV 89.8 80.0 - 99.0 FL    MCH 26.5 26.0 - 34.0 PG    MCHC 29.5 (L) 30.0 - 36.5 g/dL    RDW 17.8 (H) 11.5 - 14.5 %    PLATELET 167 (L) 339 - 400 K/uL    MPV 10.7 8.9 - 12.9 FL    NRBC 0.0 0  WBC    ABSOLUTE NRBC 0.00 0.00 - 0.01 K/uL    NEUTROPHILS 76 (H) 32 - 75 %    LYMPHOCYTES 19 12 - 49 %    MONOCYTES 3 (L) 5 - 13 %    EOSINOPHILS 0 0 - 7 %    BASOPHILS 1 0 - 1 %    IMMATURE GRANULOCYTES 0 0.0 - 0.5 %    ABS. NEUTROPHILS 2.5 1.8 - 8.0 K/UL    ABS. LYMPHOCYTES 0.6 (L) 0.8 - 3.5 K/UL    ABS. MONOCYTES 0.1 0.0 - 1.0 K/UL    ABS. EOSINOPHILS 0.0 0.0 - 0.4 K/UL    ABS. BASOPHILS 0.0 0.0 - 0.1 K/UL    ABS. IMM.  GRANS. 0.0 0.00 - 0.04 K/UL    DF AUTOMATED     MAGNESIUM    Collection Time: 06/17/18  3:00 PM   Result Value Ref Range Magnesium 1.7 1.6 - 2.4 mg/dL   PHOSPHORUS    Collection Time: 06/17/18  3:00 PM   Result Value Ref Range    Phosphorus 3.0 2.6 - 4.7 MG/DL   CK W/ CKMB & INDEX    Collection Time: 06/17/18  3:00 PM   Result Value Ref Range    CK 22 (L) 26 - 192 U/L    CK - MB <1.0 <3.6 NG/ML    CK-MB Index Cannot be calculated 0 - 2.5     TSH 3RD GENERATION    Collection Time: 06/17/18  3:00 PM   Result Value Ref Range    TSH 3.01 0.36 - 3.74 uIU/mL   TROPONIN I    Collection Time: 06/17/18  3:00 PM   Result Value Ref Range    Troponin-I, Qt. 0.06 (H) <0.05 ng/mL   AMYLASE    Collection Time: 06/17/18  3:00 PM   Result Value Ref Range    Amylase 112 25 - 115 U/L   LIPASE    Collection Time: 06/17/18  3:00 PM   Result Value Ref Range    Lipase 88 73 - 393 U/L   FOLATE    Collection Time: 06/17/18  3:00 PM   Result Value Ref Range    Folate 13.7 5.0 - 21.0 ng/mL   VITAMIN B12    Collection Time: 06/17/18  3:00 PM   Result Value Ref Range    Vitamin B12 >2000 (H) 193 - 986 pg/mL   IRON    Collection Time: 06/17/18  3:00 PM   Result Value Ref Range    Iron 34 (L) 35 - 150 ug/dL   IRON PROFILE    Collection Time: 06/17/18  3:00 PM   Result Value Ref Range    Iron 34 (L) 35 - 150 ug/dL    TIBC 137 (L) 250 - 450 ug/dL    Iron % saturation 25 20 - 50 %   FERRITIN    Collection Time: 06/17/18  3:00 PM   Result Value Ref Range    Ferritin 4182 (H) 8 - 252 NG/ML   LACTIC ACID    Collection Time: 06/17/18  3:00 PM   Result Value Ref Range    Lactic acid 1.4 0.4 - 2.0 MMOL/L   MAGNESIUM    Collection Time: 06/18/18  2:10 AM   Result Value Ref Range    Magnesium 1.6 1.6 - 2.4 mg/dL   PHOSPHORUS    Collection Time: 06/18/18  2:10 AM   Result Value Ref Range    Phosphorus 2.8 2.6 - 4.7 MG/DL   CK W/ CKMB & INDEX    Collection Time: 06/18/18  2:10 AM   Result Value Ref Range    CK 25 (L) 26 - 192 U/L    CK - MB <1.0 <3.6 NG/ML    CK-MB Index Cannot be calculated 0 - 2.5     METABOLIC PANEL, BASIC    Collection Time: 06/18/18  2:10 AM   Result Value Ref Range    Sodium 138 136 - 145 mmol/L    Potassium 4.4 3.5 - 5.1 mmol/L    Chloride 100 97 - 108 mmol/L    CO2 29 21 - 32 mmol/L    Anion gap 9 5 - 15 mmol/L    Glucose 105 (H) 65 - 100 mg/dL    BUN 27 (H) 6 - 20 MG/DL    Creatinine 4.70 (H) 0.55 - 1.02 MG/DL    BUN/Creatinine ratio 6 (L) 12 - 20      GFR est AA 13 (L) >60 ml/min/1.73m2    GFR est non-AA 11 (L) >60 ml/min/1.73m2    Calcium 8.4 (L) 8.5 - 10.1 MG/DL          Radiology:        Current Facility-Administered Medications   Medication Dose Route Frequency    acetaminophen (TYLENOL) tablet 650 mg  650 mg Oral Q4H PRN    oxyCODONE-acetaminophen (PERCOCET) 5-325 mg per tablet 1 Tab  1 Tab Oral Q4H PRN    albuterol-ipratropium (DUO-NEB) 2.5 MG-0.5 MG/3 ML  3 mL Nebulization Q4H PRN    piperacillin-tazobactam (ZOSYN) 3.375 g in 0.9% sodium chloride (MBP/ADV) 100 mL  3.375 g IntraVENous Q12H    Vancomycin Pharmacy Dosing   Other Rx Dosing/Monitoring    allopurinol (ZYLOPRIM) tablet 100 mg  100 mg Oral DAILY AFTER BREAKFAST    amitriptyline (ELAVIL) tablet 25 mg  25 mg Oral QHS PRN    amLODIPine (NORVASC) tablet 10 mg  10 mg Oral DAILY    cyanocobalamin (VITAMIN B12) tablet 2,500 mcg  2,500 mcg Oral DAILY    gemfibrozil (LOPID) tablet 300 mg  300 mg Oral DAILY    hydroxychloroquine (PLAQUENIL) tablet 200 mg  200 mg Oral BID    pantoprazole (PROTONIX) tablet 40 mg  40 mg Oral ACB    polyethylene glycol (MIRALAX) packet 17 g  17 g Oral DAILY    predniSONE (DELTASONE) tablet 5 mg  5 mg Oral DAILY    senna (SENOKOT) tablet 8.6 mg  1 Tab Oral DAILY PRN    sodium chloride (NS) flush 5-10 mL  5-10 mL IntraVENous Q8H    sodium chloride (NS) flush 5-10 mL  5-10 mL IntraVENous PRN    ondansetron (ZOFRAN) injection 4 mg  4 mg IntraVENous Q4H PRN    fentaNYL citrate (PF) injection 25 mcg  25 mcg IntraVENous Q4H PRN    arformoterol (BROVANA) neb solution 15 mcg  15 mcg Nebulization BID RT    And    budesonide (PULMICORT) 500 mcg/2 ml nebulizer suspension  500 mcg Nebulization BID RT    hydrALAZINE (APRESOLINE) 20 mg/mL injection 10 mg  10 mg IntraVENous Q6H PRN    vancomycin (VANCOCIN) 500 mg in 0.9% sodium chloride (MBP/ADV) 100 mL  500 mg IntraVENous DIALYSIS PRN    carvedilol (COREG) tablet 25 mg  25 mg Oral BID WITH MEALS    losartan (COZAAR) tablet 50 mg  50 mg Oral DAILY    cloNIDine HCl (CATAPRES) tablet 0.1 mg  0.1 mg Oral BID    midazolam (VERSED) injection 1 mg  1 mg IntraVENous Multiple    apixaban (ELIQUIS) tablet 2.5 mg  2.5 mg Oral Q12H          Joie Hernandez, KHANH     [facility] [address] [place] [address], [address] [place] [address],[address]   Jerzy Ivy   [phone]

## 2018-06-19 LAB
ANION GAP SERPL CALC-SCNC: 8 MMOL/L (ref 5–15)
BACTERIA SPEC CULT: NORMAL
BACTERIA SPEC CULT: NORMAL
BUN SERPL-MCNC: 33 MG/DL (ref 6–20)
BUN/CREAT SERPL: 5 (ref 12–20)
CALCIUM SERPL-MCNC: 8.3 MG/DL (ref 8.5–10.1)
CHLORIDE SERPL-SCNC: 101 MMOL/L (ref 97–108)
CO2 SERPL-SCNC: 30 MMOL/L (ref 21–32)
CREAT SERPL-MCNC: 6.14 MG/DL (ref 0.55–1.02)
ERYTHROCYTE [DISTWIDTH] IN BLOOD BY AUTOMATED COUNT: 17.9 % (ref 11.5–14.5)
GLUCOSE SERPL-MCNC: 98 MG/DL (ref 65–100)
HCT VFR BLD AUTO: 27.8 % (ref 35–47)
HGB BLD-MCNC: 8.3 G/DL (ref 11.5–16)
MCH RBC QN AUTO: 25.9 PG (ref 26–34)
MCHC RBC AUTO-ENTMCNC: 29.9 G/DL (ref 30–36.5)
MCV RBC AUTO: 86.9 FL (ref 80–99)
NRBC # BLD: 0 K/UL (ref 0–0.01)
NRBC BLD-RTO: 0 PER 100 WBC
PLATELET # BLD AUTO: 163 K/UL (ref 150–400)
PMV BLD AUTO: 10.4 FL (ref 8.9–12.9)
POTASSIUM SERPL-SCNC: 4.8 MMOL/L (ref 3.5–5.1)
RBC # BLD AUTO: 3.2 M/UL (ref 3.8–5.2)
SERVICE CMNT-IMP: NORMAL
SODIUM SERPL-SCNC: 139 MMOL/L (ref 136–145)
WBC # BLD AUTO: 3.1 K/UL (ref 3.6–11)

## 2018-06-19 PROCEDURE — 94640 AIRWAY INHALATION TREATMENT: CPT

## 2018-06-19 PROCEDURE — 74011000250 HC RX REV CODE- 250: Performed by: INTERNAL MEDICINE

## 2018-06-19 PROCEDURE — 74011000258 HC RX REV CODE- 258: Performed by: HOSPITALIST

## 2018-06-19 PROCEDURE — 77030029684 HC NEB SM VOL KT MONA -A

## 2018-06-19 PROCEDURE — 3331090002 HH PPS REVENUE DEBIT

## 2018-06-19 PROCEDURE — 3331090001 HH PPS REVENUE CREDIT

## 2018-06-19 PROCEDURE — 74011250637 HC RX REV CODE- 250/637: Performed by: SPECIALIST

## 2018-06-19 PROCEDURE — 90935 HEMODIALYSIS ONE EVALUATION: CPT

## 2018-06-19 PROCEDURE — 36415 COLL VENOUS BLD VENIPUNCTURE: CPT | Performed by: INTERNAL MEDICINE

## 2018-06-19 PROCEDURE — 74011250636 HC RX REV CODE- 250/636: Performed by: INTERNAL MEDICINE

## 2018-06-19 PROCEDURE — 5A1D70Z PERFORMANCE OF URINARY FILTRATION, INTERMITTENT, LESS THAN 6 HOURS PER DAY: ICD-10-PCS | Performed by: HOSPITALIST

## 2018-06-19 PROCEDURE — 85027 COMPLETE CBC AUTOMATED: CPT | Performed by: INTERNAL MEDICINE

## 2018-06-19 PROCEDURE — 74011000258 HC RX REV CODE- 258: Performed by: INTERNAL MEDICINE

## 2018-06-19 PROCEDURE — 80048 BASIC METABOLIC PNL TOTAL CA: CPT | Performed by: INTERNAL MEDICINE

## 2018-06-19 PROCEDURE — 74011250637 HC RX REV CODE- 250/637: Performed by: INTERNAL MEDICINE

## 2018-06-19 PROCEDURE — 65270000029 HC RM PRIVATE

## 2018-06-19 PROCEDURE — 74011636637 HC RX REV CODE- 636/637: Performed by: INTERNAL MEDICINE

## 2018-06-19 PROCEDURE — 36591 DRAW BLOOD OFF VENOUS DEVICE: CPT

## 2018-06-19 PROCEDURE — 74011250636 HC RX REV CODE- 250/636: Performed by: HOSPITALIST

## 2018-06-19 RX ADMIN — CLONIDINE HYDROCHLORIDE 0.1 MG: 0.1 TABLET ORAL at 11:17

## 2018-06-19 RX ADMIN — OXYCODONE AND ACETAMINOPHEN 1 TABLET: 5; 325 TABLET ORAL at 21:09

## 2018-06-19 RX ADMIN — FENTANYL CITRATE 25 MCG: 50 INJECTION, SOLUTION INTRAMUSCULAR; INTRAVENOUS at 17:24

## 2018-06-19 RX ADMIN — PIPERACILLIN SODIUM,TAZOBACTAM SODIUM 3.38 G: 3; .375 INJECTION, POWDER, FOR SOLUTION INTRAVENOUS at 11:17

## 2018-06-19 RX ADMIN — OXYCODONE AND ACETAMINOPHEN 1 TABLET: 5; 325 TABLET ORAL at 11:15

## 2018-06-19 RX ADMIN — GEMFIBROZIL 300 MG: 600 TABLET ORAL at 11:18

## 2018-06-19 RX ADMIN — FENTANYL CITRATE 25 MCG: 50 INJECTION, SOLUTION INTRAMUSCULAR; INTRAVENOUS at 03:11

## 2018-06-19 RX ADMIN — Medication 10 ML: at 07:10

## 2018-06-19 RX ADMIN — CLONIDINE HYDROCHLORIDE 0.1 MG: 0.1 TABLET ORAL at 16:48

## 2018-06-19 RX ADMIN — Medication 10 ML: at 03:15

## 2018-06-19 RX ADMIN — CARVEDILOL 25 MG: 12.5 TABLET, FILM COATED ORAL at 16:48

## 2018-06-19 RX ADMIN — PREDNISONE 5 MG: 5 TABLET ORAL at 11:17

## 2018-06-19 RX ADMIN — ARFORMOTEROL TARTRATE 15 MCG: 15 SOLUTION RESPIRATORY (INHALATION) at 21:20

## 2018-06-19 RX ADMIN — LOSARTAN POTASSIUM 50 MG: 50 TABLET ORAL at 11:17

## 2018-06-19 RX ADMIN — FENTANYL CITRATE 25 MCG: 50 INJECTION, SOLUTION INTRAMUSCULAR; INTRAVENOUS at 13:00

## 2018-06-19 RX ADMIN — BUDESONIDE 500 MCG: 0.5 INHALANT RESPIRATORY (INHALATION) at 21:20

## 2018-06-19 RX ADMIN — Medication 10 ML: at 11:23

## 2018-06-19 RX ADMIN — Medication 10 ML: at 21:04

## 2018-06-19 RX ADMIN — FENTANYL CITRATE 25 MCG: 50 INJECTION, SOLUTION INTRAMUSCULAR; INTRAVENOUS at 22:18

## 2018-06-19 RX ADMIN — VANCOMYCIN HYDROCHLORIDE 500 MG: 500 INJECTION, POWDER, LYOPHILIZED, FOR SOLUTION INTRAVENOUS at 20:57

## 2018-06-19 RX ADMIN — HYDROXYCHLOROQUINE SULFATE 200 MG: 200 TABLET, FILM COATED ORAL at 16:49

## 2018-06-19 RX ADMIN — Medication 10 ML: at 14:44

## 2018-06-19 RX ADMIN — FENTANYL CITRATE 25 MCG: 50 INJECTION, SOLUTION INTRAMUSCULAR; INTRAVENOUS at 07:10

## 2018-06-19 RX ADMIN — OXYCODONE AND ACETAMINOPHEN 1 TABLET: 5; 325 TABLET ORAL at 16:00

## 2018-06-19 RX ADMIN — BUDESONIDE 500 MCG: 0.5 INHALANT RESPIRATORY (INHALATION) at 08:01

## 2018-06-19 RX ADMIN — HYDROXYCHLOROQUINE SULFATE 200 MG: 200 TABLET, FILM COATED ORAL at 11:16

## 2018-06-19 RX ADMIN — EPOETIN ALFA 8000 UNITS: 4000 SOLUTION INTRAVENOUS; SUBCUTANEOUS at 20:57

## 2018-06-19 RX ADMIN — Medication 10 ML: at 17:25

## 2018-06-19 RX ADMIN — CARVEDILOL 25 MG: 12.5 TABLET, FILM COATED ORAL at 11:16

## 2018-06-19 RX ADMIN — AMLODIPINE BESYLATE 10 MG: 5 TABLET ORAL at 11:16

## 2018-06-19 RX ADMIN — PANTOPRAZOLE SODIUM 40 MG: 40 TABLET, DELAYED RELEASE ORAL at 07:10

## 2018-06-19 RX ADMIN — ARFORMOTEROL TARTRATE 15 MCG: 15 SOLUTION RESPIRATORY (INHALATION) at 08:01

## 2018-06-19 RX ADMIN — ALLOPURINOL 100 MG: 100 TABLET ORAL at 11:17

## 2018-06-19 RX ADMIN — PIPERACILLIN SODIUM,TAZOBACTAM SODIUM 3.38 G: 3; .375 INJECTION, POWDER, FOR SOLUTION INTRAVENOUS at 22:19

## 2018-06-19 RX ADMIN — POLYETHYLENE GLYCOL 3350 17 G: 17 POWDER, FOR SOLUTION ORAL at 11:17

## 2018-06-19 RX ADMIN — Medication 2500 MCG: at 11:16

## 2018-06-19 NOTE — PROGRESS NOTES
NAME: Avi Meneses        :  1985        MRN:  [id number]        Assessment :    Plan:  - End-stage renal disease - TTS - [facility]     SLE    Anemia    PNA     --Just finished up dialysis. Tolerated well. Next planned HD Thursday. Continue EPO. Addendum: Asked to do short dialysis tomorrow afternoon in anticipation of surgery Thursday. Subjective:     Chief Complaint:  \" I'm having surgery Thursday. \"  No dyspnea. No cough. No N/V. No HA. Review of Systems:    Symptom Y/N Comments  Symptom Y/N Comments   Fever/Chills    Chest Pain     Poor Appetite    Edema     Cough    Abdominal Pain     Sputum    Joint Pain     SOB/IVY    Pruritis/Rash     Nausea/vomit    Tolerating PT/OT     Diarrhea    Tolerating Diet     Constipation    Other       Could not obtain due to:      Objective:     VITALS:   Last 24hrs VS reviewed since prior progress note.  Most recent are:  Visit Vitals    BP (!) 148/103    Pulse 83    Temp 98.3 °F (36.8 °C) (Oral)    Resp 20    Ht 5' 5\" (1.651 m)    Wt 68.3 kg (150 lb 9.2 oz)    SpO2 95%    BMI 25.06 kg/m2       Intake/Output Summary (Last 24 hours) at 18 1108  Last data filed at 18 1015   Gross per 24 hour   Intake             1090 ml   Output             2000 ml   Net             -910 ml      Telemetry Reviewed:     PHYSICAL EXAM:  General: NAD  CTA  No edema      Lab Data Reviewed: (see below)    Medications Reviewed: (see below)    PMH/SH reviewed - no change compared to H&P  ________________________________________________________________________  Care Plan discussed with:  Patient     Family      RN     Care Manager                    Consultant:          Comments   >50% of visit spent in counseling and coordination of care       ________________________________________________________________________  Katty Andino MD     Procedures: see electronic medical records for all procedures/Xrays and details which  were not copied into this note but were reviewed prior to creation of Plan. LABS:  Recent Labs      06/19/18   0315  06/17/18   1500   WBC  3.1*  3.2*   HGB  8.3*  8.6*   HCT  27.8*  29.2*   PLT  163  148*     Recent Labs      06/19/18   0315  06/18/18   0210  06/17/18   1500   NA  139  138  139   K  4.8  4.4  4.3   CL  101  100  99   CO2  30  29  32   BUN  33*  27*  20   CREA  6.14*  4.70*  4.13*   GLU  98  105*  93   CA  8.3*  8.4*  8.5   MG   --   1.6  1.7   PHOS   --   2.8  3.0     Recent Labs      06/17/18   1500  06/16/18   2233   SGOT   --   60*   AP   --   75   TP   --   7.3   ALB   --   2.2*   GLOB   --   5.1*   AML  112   --    LPSE  88   --      No results for input(s): INR, PTP, APTT in the last 72 hours. No lab exists for component: INREXT, INREXT   Recent Labs      06/17/18   1500   TIBC  137*   PSAT  25   FERR  4182*      Lab Results   Component Value Date/Time    Folate 13.7 06/17/2018 03:00 PM      No results for input(s): PH, PCO2, PO2 in the last 72 hours.   Recent Labs      06/18/18   0210 06/17/18   1500   CPK  25*  22*   CKMB  <1.0  <1.0     No components found for: [place]  Lab Results   Component Value Date/Time    Color YELLOW/STRAW 08/12/2016 09:06 PM    Appearance CLEAR 08/12/2016 09:06 PM    Specific gravity 1.017 08/12/2016 09:06 PM    Specific gravity 1.010 07/11/2016 12:44 PM    pH (UA) 7.0 08/12/2016 09:06 PM    Protein 300 (A) 08/12/2016 09:06 PM    Glucose NEGATIVE  08/12/2016 09:06 PM    Ketone TRACE (A) 08/12/2016 09:06 PM    Bilirubin NEGATIVE  07/11/2016 12:44 PM    Urobilinogen 1.0 08/12/2016 09:06 PM    Nitrites NEGATIVE  08/12/2016 09:06 PM    Leukocyte Esterase NEGATIVE  08/12/2016 09:06 PM    Epithelial cells MODERATE (A) 08/12/2016 09:06 PM    Bacteria 1+ (A) 08/12/2016 09:06 PM    WBC 0-4 08/12/2016 09:06 PM    RBC 0-5 08/12/2016 09:06 PM       MEDICATIONS:  Current Facility-Administered Medications   Medication Dose Route Frequency    epoetin pia (EPOGEN;PROCRIT) injection 8,000 Units  8,000 Units SubCUTAneous DIALYSIS TUE, THU & SAT    vancomycin (VANCOCIN) 500 mg in 0.9% sodium chloride (MBP/ADV) 100 mL  500 mg IntraVENous DIALYSIS TUE, THU & SAT    acetaminophen (TYLENOL) tablet 650 mg  650 mg Oral Q4H PRN    oxyCODONE-acetaminophen (PERCOCET) 5-325 mg per tablet 1 Tab  1 Tab Oral Q4H PRN    albuterol-ipratropium (DUO-NEB) 2.5 MG-0.5 MG/3 ML  3 mL Nebulization Q4H PRN    piperacillin-tazobactam (ZOSYN) 3.375 g in 0.9% sodium chloride (MBP/ADV) 100 mL  3.375 g IntraVENous Q12H    Vancomycin Pharmacy Dosing   Other Rx Dosing/Monitoring    allopurinol (ZYLOPRIM) tablet 100 mg  100 mg Oral DAILY AFTER BREAKFAST    amitriptyline (ELAVIL) tablet 25 mg  25 mg Oral QHS PRN    amLODIPine (NORVASC) tablet 10 mg  10 mg Oral DAILY    cyanocobalamin (VITAMIN B12) tablet 2,500 mcg  2,500 mcg Oral DAILY    gemfibrozil (LOPID) tablet 300 mg  300 mg Oral DAILY    hydroxychloroquine (PLAQUENIL) tablet 200 mg  200 mg Oral BID    pantoprazole (PROTONIX) tablet 40 mg  40 mg Oral ACB    polyethylene glycol (MIRALAX) packet 17 g  17 g Oral DAILY    predniSONE (DELTASONE) tablet 5 mg  5 mg Oral DAILY    senna (SENOKOT) tablet 8.6 mg  1 Tab Oral DAILY PRN    sodium chloride (NS) flush 5-10 mL  5-10 mL IntraVENous Q8H    sodium chloride (NS) flush 5-10 mL  5-10 mL IntraVENous PRN    ondansetron (ZOFRAN) injection 4 mg  4 mg IntraVENous Q4H PRN    fentaNYL citrate (PF) injection 25 mcg  25 mcg IntraVENous Q4H PRN    arformoterol (BROVANA) neb solution 15 mcg  15 mcg Nebulization BID RT    And    budesonide (PULMICORT) 500 mcg/2 ml nebulizer suspension  500 mcg Nebulization BID RT    hydrALAZINE (APRESOLINE) 20 mg/mL injection 10 mg  10 mg IntraVENous Q6H PRN    carvedilol (COREG) tablet 25 mg  25 mg Oral BID WITH MEALS    losartan (COZAAR) tablet 50 mg  50 mg Oral DAILY    cloNIDine HCl (CATAPRES) tablet 0.1 mg  0.1 mg Oral BID    midazolam (VERSED) injection 1 mg  1 mg IntraVENous Multiple

## 2018-06-19 NOTE — CONSULTS
Asked to see Ms. Ball re: right parapneumonic effusion    Ms. Hilda Morton is a pleasant 30 yo lady with ESRD [HD TTS] lupus asthma and thromboembolism who was admitted for dyspnea and pneumonia. She was recently hospitalized with pneumonia and a right pleural effusion which was drained, in addition she was septic with multiple abdominal abscesses. She has been found to have a complex, loculated right pleural effusion. Allergies: compazine    PMHx: ESRD, Lupus, Asthma, Thromboembolism    PSHx: Abd drainage, PD catheter placement and removal, HD vascular access    SocHx: non smoker    FamHx: father and mother DMII    Meds: Albuterol 90 mcg 1-2 puffs by inhalation every 4 hours as needed for wheezing or shortness of breath, allopurinol 100 mg daily, Elavil 25 mg daily at bedtime as needed for sleep, Norvasc 5 mg daily, Eliquis 5 mg twice daily, Imuran 50 mg daily, Coreg 12.5 mg twice daily, Breo 200/25 mcg inhalation daily, Lopid 300 mg daily, Plaquenil 200 mg twice daily, oxycodone IR, dosage as directed, Protonix 40 mg daily, MiraLax 17 grams daily, prednisone 5 mg daily, senna 8.6 mg daily as needed for constipation. Vancomycin, Zosyn    ROS:    Constitutional- easily fatigued  HEENT- denies dysphagia  Neuro- denies syncope  Optho- no changes in vision  Resp- dyspnea  CV- denies angina  GI- abd pain associated with previous infection  - no complaints  ID- recent fevers  Vasc- denies claudication    Afebrile  P 70-80  -130/70    Positive fluid balance    On exam she is laying in bed  Appears debilitated  Weak voice  Normal affect  Not tachypneic, not using accessory muscles  Appears grossly edematous  Lungs diminished breath sounds right side  Chest wall non tender  RRR, no murmurs  RUE HD site accessed  Abd softy, mildly tender, drain in place  LE edematous  ==========================    WBC 3.1  Hgb 8.3  Plts 163    Cr 6.14    ==========================    I have personally reviewed her Chest CT.   She has a complex right pleural fluid collection which is growing in size.    ==============================    Diagnoses  1- Right parapneumonic effusion [loculated]  2- Nosoccomial pneumonia  3- ESRD  4- Intra-abdominal abscesses    ==============================    MsLorenzo Butler has a symptomatic complex right parapneumonic effusion. This is most likely directly related to her recent pneumonia. I think she would benefit from a right VATS decortication. She will eventually experience lung entrapment if the effusion remains especially given her need for long term HD. I will post for Thursday morning. Need to hold Eliquis. Gen Surgery managing abd drains. She is moderate surgical risk despite her young age given her lupus, ESRD and thromboembolism.

## 2018-06-19 NOTE — PROGRESS NOTES
Hospitalist Progress Note  Clara Florian MD  Answering service: [phone] OR [phone] from in house phone  Cell: [phone]      Date of Service:  2018  NAME:  Alex Hemphill  :  1985  MRN:  [id number]      Admission Summary: This is a 69-year-old woman with a past medical history significant for end-stage renal disease on hemodialysis, lupus, asthma, thromboembolism, dyslipidemia, was in her usual state of health until the day of presentation at the emergency room when the patient developed shortness of breath. The shortness of breath is progressive, it is with little or no activity. Patient had routine dialysis done. After the dialysis, that is when the patient developed the shortness of breath. Patient used to be on peritoneal dialysis, but was converted to hemodialysis because of recurrent infection. The patient was last admitted to this hospital from 2018 to 2018. She was admitted and treated for sepsis, attributed to multiple abdominal abscesses. Patient underwent drainage of the abdominal abscess. Patient also underwent drainage of a right pleural effusion. She was discharged home to complete a course of Augmentin. She stated that she has been having shortness of breath on and off since she was discharged from hospital.  A day before coming to the emergency room here, she was seen at another emergency room. Patient was evaluated, but was not admitted. When the patient arrived at the emergency room, she was found to have elevated lactic acid level, low grade temperature, tachycardia. Code sepsis was called.   The chest x-ray shows evidence of pneumonia  Similar presentation last admission as well    Interval history / Subjective:     F/u Pneumonia   Having HD today  No chest pain, headache  Assessment & Plan:     Health care associated Pneumonia with partially loculated pleural effusion (?recurrent)  -CXR 6/17 Unchanged mild right pleural effusion with patchy opacification right base  -Vanc/Zosyn  -Follow cultures  -CT chest/abd 6/17 The pleural fluid on the right extends more superiorly in the right hemithorax and there is a more focal collection laterally measuring 5.4 cm may be partially loculated.  -Last admission patient had US guided removal with chest tube placed  -Consult thoracic surgery- For Right VATS on thursday  Consulted ID    Abdominal cavity abscess (last admission)  -CT abd 6/17 Pigtail catheter overlies collection in the pelvis anteriorly which has diminished considerably in size since 5/16/2018 and there is a small amount of residual fluid noted    Suspected sepsis  -as above  -blood culture 6/16 alpha Strep in  1/2 bottles  -Repeat blood culture 6/18    ESRD  -on HD (TTS)  -Appreciate Nephrology    Anemia  -likely of chronic disease    Lupus  -Outpatient being followed by Dr Dionne Nair  -hold imuran     Chronic DVT  -on Eliquis - on hold for surgery     Elevated troponin  -unremarkable  - Cardiology consulted  -Echo EF 60-65% with no RWMA    Dyslipidemia  -continue home meds    Renal diet    Code status: FULL CODE  DVT prophylaxis: Eliquis  PTA: home    Plan: Continue antibiotics, follow cultures,    Care Plan discussed with: Patient/Family  Disposition: TBD     Hospital Problems  Date Reviewed: 6/17/2018          Codes Class Noted POA    Troponin level elevated ICD-10-CM: R74.8  ICD-9-CM: 790.6  6/17/2018 Unknown        * (Principal)Sepsis ([facility] [address] ICD-10-CM: A41.9  ICD-9-CM: 038.9, 995.91  4/29/2018 Yes                Review of Systems:   A comprehensive review of systems was negative except for that written in the HPI. Vital Signs:    Last 24hrs VS reviewed since prior progress note.  Most recent are:  Visit Vitals    /71 (BP 1 Location: Left arm, BP Patient Position: At rest)    Pulse 78    Temp 97.8 °F (36.6 °C)    Resp 20    Ht 5' 5\" (1.651 m)    Wt 68.3 kg (150 lb 9.2 oz)    SpO2 95%    BMI 25.06 kg/m2         Intake/Output Summary (Last 24 hours) at 06/19/18 1631  Last data filed at 06/19/18 1448   Gross per 24 hour   Intake             1430 ml   Output             2000 ml   Net             -570 ml        Physical Examination:             Constitutional:  No acute distress, cooperative, pleasant    ENT:  Oral mucous moist, oropharynx benign. Neck supple,    Resp:  Bilateral diminished breath sounds. No accessory muscle use   CV:  Regular rhythm, normal rate, no murmurs, gallops, rubs    GI:  Soft, non distended, non tender. normoactive bowel sounds, no hepatosplenomegaly, drain in place    Musculoskeletal:  No edema, warm, 2+ pulses throughout,Right AV fistula in place    Neurologic:  Moves all extremities. AAOx3, CN II-XII reviewed     Skin:  Good turgor, no rashes or ulcers       Data Review:    Review and/or order of clinical lab test      Labs:     Recent Labs      06/19/18   0315  06/17/18   1500   WBC  3.1*  3.2*   HGB  8.3*  8.6*   HCT  27.8*  29.2*   PLT  163  148*     Recent Labs      06/19/18   0315  06/18/18   0210  06/17/18   1500   NA  139  138  139   K  4.8  4.4  4.3   CL  101  100  99   CO2  30  29  32   BUN  33*  27*  20   CREA  6.14*  4.70*  4.13*   GLU  98  105*  93   CA  8.3*  8.4*  8.5   MG   --   1.6  1.7   PHOS   --   2.8  3.0     Recent Labs      06/17/18   1500  06/16/18   2233   SGOT   --   60*   ALT   --   10*   AP   --   75   TBILI   --   0.3   TP   --   7.3   ALB   --   2.2*   GLOB   --   5.1*   AML  112   --    LPSE  88   --      No results for input(s): INR, PTP, APTT in the last 72 hours. No lab exists for component: INREXT, INREXT   Recent Labs      06/17/18   1500   TIBC  137*   PSAT  25   FERR  4182*      Lab Results   Component Value Date/Time    Folate 13.7 06/17/2018 03:00 PM      No results for input(s): PH, PCO2, PO2 in the last 72 hours.   Recent Labs      06/18/18   0210  06/17/18   1500  06/16/18   2233   CPK  25*  22* --    CKNDX  Cannot be calculated  Cannot be calculated   --    TROIQ   --   0.06*  0.05*     Lab Results   Component Value Date/Time    Cholesterol, total 117 09/25/2015 02:02 PM    HDL Cholesterol 31 (L) 09/25/2015 02:02 PM    LDL, calculated 57 09/25/2015 02:02 PM    Triglyceride 146 09/25/2015 02:02 PM    CHOL/HDL Ratio 7.7 (H) 05/31/2009 10:30 AM     Lab Results   Component Value Date/Time    Glucose (POC) 123 (H) 04/20/2018 05:33 PM    Glucose (POC) 95 04/20/2018 11:19 AM    Glucose (POC) 100 04/20/2018 07:18 AM    Glucose (POC) 119 (H) 04/19/2018 09:11 PM    Glucose (POC) 86 04/19/2018 04:30 PM     Lab Results   Component Value Date/Time    Color YELLOW/STRAW 08/12/2016 09:06 PM    Appearance CLEAR 08/12/2016 09:06 PM    Specific gravity 1.017 08/12/2016 09:06 PM    Specific gravity 1.010 07/11/2016 12:44 PM    pH (UA) 7.0 08/12/2016 09:06 PM    Protein 300 (A) 08/12/2016 09:06 PM    Glucose NEGATIVE  08/12/2016 09:06 PM    Ketone TRACE (A) 08/12/2016 09:06 PM    Bilirubin NEGATIVE  07/11/2016 12:44 PM    Urobilinogen 1.0 08/12/2016 09:06 PM    Nitrites NEGATIVE  08/12/2016 09:06 PM    Leukocyte Esterase NEGATIVE  08/12/2016 09:06 PM    Epithelial cells MODERATE (A) 08/12/2016 09:06 PM    Bacteria 1+ (A) 08/12/2016 09:06 PM    WBC 0-4 08/12/2016 09:06 PM    RBC 0-5 08/12/2016 09:06 PM         Medications Reviewed:     Current Facility-Administered Medications   Medication Dose Route Frequency    epoetin pia (EPOGEN;PROCRIT) injection 8,000 Units  8,000 Units SubCUTAneous DIALYSIS TUE, THU & SAT    vancomycin (VANCOCIN) 500 mg in 0.9% sodium chloride (MBP/ADV) 100 mL  500 mg IntraVENous DIALYSIS TUE, THU & SAT    acetaminophen (TYLENOL) tablet 650 mg  650 mg Oral Q4H PRN    oxyCODONE-acetaminophen (PERCOCET) 5-325 mg per tablet 1 Tab  1 Tab Oral Q4H PRN    albuterol-ipratropium (DUO-NEB) 2.5 MG-0.5 MG/3 ML  3 mL Nebulization Q4H PRN    piperacillin-tazobactam (ZOSYN) 3.375 g in 0.9% sodium chloride (MBP/ADV) 100 mL  3.375 g IntraVENous Q12H    Vancomycin Pharmacy Dosing   Other Rx Dosing/Monitoring    allopurinol (ZYLOPRIM) tablet 100 mg  100 mg Oral DAILY AFTER BREAKFAST    amitriptyline (ELAVIL) tablet 25 mg  25 mg Oral QHS PRN    amLODIPine (NORVASC) tablet 10 mg  10 mg Oral DAILY    cyanocobalamin (VITAMIN B12) tablet 2,500 mcg  2,500 mcg Oral DAILY    gemfibrozil (LOPID) tablet 300 mg  300 mg Oral DAILY    hydroxychloroquine (PLAQUENIL) tablet 200 mg  200 mg Oral BID    pantoprazole (PROTONIX) tablet 40 mg  40 mg Oral ACB    polyethylene glycol (MIRALAX) packet 17 g  17 g Oral DAILY    predniSONE (DELTASONE) tablet 5 mg  5 mg Oral DAILY    senna (SENOKOT) tablet 8.6 mg  1 Tab Oral DAILY PRN    sodium chloride (NS) flush 5-10 mL  5-10 mL IntraVENous Q8H    sodium chloride (NS) flush 5-10 mL  5-10 mL IntraVENous PRN    ondansetron (ZOFRAN) injection 4 mg  4 mg IntraVENous Q4H PRN    fentaNYL citrate (PF) injection 25 mcg  25 mcg IntraVENous Q4H PRN    arformoterol (BROVANA) neb solution 15 mcg  15 mcg Nebulization BID RT    And    budesonide (PULMICORT) 500 mcg/2 ml nebulizer suspension  500 mcg Nebulization BID RT    hydrALAZINE (APRESOLINE) 20 mg/mL injection 10 mg  10 mg IntraVENous Q6H PRN    carvedilol (COREG) tablet 25 mg  25 mg Oral BID WITH MEALS    losartan (COZAAR) tablet 50 mg  50 mg Oral DAILY    cloNIDine HCl (CATAPRES) tablet 0.1 mg  0.1 mg Oral BID    midazolam (VERSED) injection 1 mg  1 mg IntraVENous Multiple     ______________________________________________________________________  EXPECTED LENGTH OF STAY: 4d 21h  ACTUAL LENGTH OF STAY:          2                 Pawan Vargas MD

## 2018-06-19 NOTE — DIALYSIS
[facility]  [phone]    Vitals   Pre   Post   Assessment   Pre   Post     Temp  Temp: 98.3 °F (36.8 °C) (06/19/18 0645)  98.1 LOC  AXOx3 AXO3   HR   84 83 Lungs   CTA  CTA   B/P   134/100 149/111 Cardiac   RRR  RRR   Resp   20 20 Skin   Warm and dry  Warm and dry   Pain level  0 0 Edema  Generalized     Generalized   Orders:    Duration:   Start:    0645 End:    1015 Total:   3.5   Dialyzer:   Dialyzer/Set Up Inspection: Francisco Javier Garcia (06/19/18 0645)   K Bath:   Dialysate K (mEq/L): 2 (06/19/18 0645)   Ca Bath:   Dialysate CA (mEq/L): 2.5 (06/19/18 0645)   Na/Bicarb:   Dialysate NA (mEq/L): 140 (06/19/18 0645)   Target Fluid Removal:   Goal/Amount of Fluid to Remove (mL): 2000 mL (06/19/18 0645)   Access     Type & Location:   (R) AVG + Bruit and thrill. Cannulated with 15G needles x 2 sites and secured with paper tape. Labs     Obtained/Reviewed   Critical Results Called   Date when labs were drawn-  Hgb-    HGB   Date Value Ref Range Status   06/19/2018 8.3 (L) 11.5 - 16.0 g/dL Final     K-    Potassium   Date Value Ref Range Status   06/19/2018 4.8 3.5 - 5.1 mmol/L Final     Ca-   Calcium   Date Value Ref Range Status   06/19/2018 8.3 (L) 8.5 - 10.1 MG/DL Final     Bun-   BUN   Date Value Ref Range Status   06/19/2018 33 (H) 6 - 20 MG/DL Final     Creat-   Creatinine   Date Value Ref Range Status   06/19/2018 6.14 (H) 0.55 - 1.02 MG/DL Final     Comment:     INVESTIGATED PER DELTA CHECK PROTOCOL        Medications/ Blood Products Given     Name   Dose   Route and Time     N/A                Blood Volume Processed (BVP):    72.0 Net Fluid   Removed:  2500 mL (Includes    Comments   Time Out Done: Yes  Primary Nurse Rpt Pre: Farrah Nelson RN  Primary Nurse Rpt Post: Sheri Hansen RN  Pt Education: Procedural   Care Plan: Continue HD as prescribed   Tx Summary: Patient tolerated treatment for 3.5 hours. Bp remained high and consistent throughout treatment.  All possible blood returned back to patient without any problems. 15G needles removed without any problems. Hemostasis achieved through handheld pressure x 5 minutes per site. Left patient in bed in stable condition with family at bedside. Reported off to primary nurse. Dialyzer [id number]  Tubing [id number]  Admiting Diagnosis:  Pt's previous clinic-  Consent signed - Informed Consent Verified: Yes (06/19/18 0645)  C[place] Consent - Yes  Hepatitis Status- Negative -01/11/2018 > 10 Immune  Machine #- Machine Number: [id number] (06/19/18 0645)  Telemetry status-N/A  Pre-dialysis wt. - Pre-Dialysis Weight: 68.3 kg (150 lb 9.2 oz) (06/19/18 0645)

## 2018-06-19 NOTE — PROGRESS NOTES
Problem: Falls - Risk of  Goal: *Absence of Falls  Document Alex Fall Risk and appropriate interventions in the flowsheet.    Outcome: Progressing Towards Goal  Fall Risk Interventions:            Medication Interventions: Evaluate medications/consider consulting pharmacy, Teach patient to arise slowly

## 2018-06-19 NOTE — PROGRESS NOTES
Bedside shift change report given to Emely Orona RN (oncoming nurse) by Basilia Ortega RN (offgoing nurse). Report included the following information SBAR and Kardex.

## 2018-06-20 ENCOUNTER — ANESTHESIA EVENT (OUTPATIENT)
Dept: SURGERY | Age: 33
DRG: 853 | End: 2018-06-20
Payer: MEDICARE

## 2018-06-20 LAB
ABO + RH BLD: NORMAL
ANION GAP SERPL CALC-SCNC: 5 MMOL/L (ref 5–15)
BLOOD GROUP ANTIBODIES SERPL: NORMAL
BUN SERPL-MCNC: 21 MG/DL (ref 6–20)
BUN/CREAT SERPL: 5 (ref 12–20)
CALCIUM SERPL-MCNC: 8.3 MG/DL (ref 8.5–10.1)
CHLORIDE SERPL-SCNC: 101 MMOL/L (ref 97–108)
CO2 SERPL-SCNC: 32 MMOL/L (ref 21–32)
CREAT SERPL-MCNC: 4.54 MG/DL (ref 0.55–1.02)
GLUCOSE SERPL-MCNC: 95 MG/DL (ref 65–100)
POTASSIUM SERPL-SCNC: 5.1 MMOL/L (ref 3.5–5.1)
SODIUM SERPL-SCNC: 138 MMOL/L (ref 136–145)
SPECIMEN EXP DATE BLD: NORMAL
VANCOMYCIN SERPL-MCNC: 19.7 UG/ML

## 2018-06-20 PROCEDURE — 80048 BASIC METABOLIC PNL TOTAL CA: CPT | Performed by: HOSPITALIST

## 2018-06-20 PROCEDURE — 74011250637 HC RX REV CODE- 250/637: Performed by: SPECIALIST

## 2018-06-20 PROCEDURE — 80202 ASSAY OF VANCOMYCIN: CPT | Performed by: INTERNAL MEDICINE

## 2018-06-20 PROCEDURE — 74011636637 HC RX REV CODE- 636/637: Performed by: INTERNAL MEDICINE

## 2018-06-20 PROCEDURE — 3331090002 HH PPS REVENUE DEBIT

## 2018-06-20 PROCEDURE — 3331090001 HH PPS REVENUE CREDIT

## 2018-06-20 PROCEDURE — 74011250637 HC RX REV CODE- 250/637: Performed by: INTERNAL MEDICINE

## 2018-06-20 PROCEDURE — 74011250636 HC RX REV CODE- 250/636: Performed by: INTERNAL MEDICINE

## 2018-06-20 PROCEDURE — 65270000029 HC RM PRIVATE

## 2018-06-20 PROCEDURE — 74011000250 HC RX REV CODE- 250: Performed by: INTERNAL MEDICINE

## 2018-06-20 PROCEDURE — 36415 COLL VENOUS BLD VENIPUNCTURE: CPT | Performed by: HOSPITALIST

## 2018-06-20 PROCEDURE — 74011000258 HC RX REV CODE- 258: Performed by: INTERNAL MEDICINE

## 2018-06-20 PROCEDURE — 90935 HEMODIALYSIS ONE EVALUATION: CPT

## 2018-06-20 PROCEDURE — 94640 AIRWAY INHALATION TREATMENT: CPT

## 2018-06-20 PROCEDURE — 86900 BLOOD TYPING SEROLOGIC ABO: CPT | Performed by: NURSE PRACTITIONER

## 2018-06-20 RX ADMIN — OXYCODONE AND ACETAMINOPHEN 1 TABLET: 5; 325 TABLET ORAL at 12:56

## 2018-06-20 RX ADMIN — FENTANYL CITRATE 25 MCG: 50 INJECTION, SOLUTION INTRAMUSCULAR; INTRAVENOUS at 14:16

## 2018-06-20 RX ADMIN — PIPERACILLIN SODIUM,TAZOBACTAM SODIUM 3.38 G: 3; .375 INJECTION, POWDER, FOR SOLUTION INTRAVENOUS at 23:09

## 2018-06-20 RX ADMIN — AMLODIPINE BESYLATE 10 MG: 5 TABLET ORAL at 09:58

## 2018-06-20 RX ADMIN — Medication 10 ML: at 20:37

## 2018-06-20 RX ADMIN — FENTANYL CITRATE 25 MCG: 50 INJECTION, SOLUTION INTRAMUSCULAR; INTRAVENOUS at 23:14

## 2018-06-20 RX ADMIN — AMITRIPTYLINE HYDROCHLORIDE 25 MG: 50 TABLET, FILM COATED ORAL at 23:14

## 2018-06-20 RX ADMIN — ARFORMOTEROL TARTRATE 15 MCG: 15 SOLUTION RESPIRATORY (INHALATION) at 07:42

## 2018-06-20 RX ADMIN — OXYCODONE AND ACETAMINOPHEN 1 TABLET: 5; 325 TABLET ORAL at 01:10

## 2018-06-20 RX ADMIN — PREDNISONE 5 MG: 5 TABLET ORAL at 09:59

## 2018-06-20 RX ADMIN — Medication 10 ML: at 23:09

## 2018-06-20 RX ADMIN — OXYCODONE AND ACETAMINOPHEN 1 TABLET: 5; 325 TABLET ORAL at 20:59

## 2018-06-20 RX ADMIN — ARFORMOTEROL TARTRATE 15 MCG: 15 SOLUTION RESPIRATORY (INHALATION) at 21:20

## 2018-06-20 RX ADMIN — Medication 2500 MCG: at 09:57

## 2018-06-20 RX ADMIN — PANTOPRAZOLE SODIUM 40 MG: 40 TABLET, DELAYED RELEASE ORAL at 07:08

## 2018-06-20 RX ADMIN — Medication 10 ML: at 14:16

## 2018-06-20 RX ADMIN — PIPERACILLIN SODIUM,TAZOBACTAM SODIUM 3.38 G: 3; .375 INJECTION, POWDER, FOR SOLUTION INTRAVENOUS at 10:40

## 2018-06-20 RX ADMIN — OXYCODONE AND ACETAMINOPHEN 1 TABLET: 5; 325 TABLET ORAL at 16:58

## 2018-06-20 RX ADMIN — ALLOPURINOL 100 MG: 100 TABLET ORAL at 09:58

## 2018-06-20 RX ADMIN — HYDROXYCHLOROQUINE SULFATE 200 MG: 200 TABLET, FILM COATED ORAL at 09:59

## 2018-06-20 RX ADMIN — LOSARTAN POTASSIUM 50 MG: 50 TABLET ORAL at 09:59

## 2018-06-20 RX ADMIN — GEMFIBROZIL 300 MG: 600 TABLET ORAL at 09:59

## 2018-06-20 RX ADMIN — VANCOMYCIN HYDROCHLORIDE 500 MG: 500 INJECTION, POWDER, LYOPHILIZED, FOR SOLUTION INTRAVENOUS at 20:37

## 2018-06-20 RX ADMIN — CLONIDINE HYDROCHLORIDE 0.1 MG: 0.1 TABLET ORAL at 18:50

## 2018-06-20 RX ADMIN — CARVEDILOL 25 MG: 12.5 TABLET, FILM COATED ORAL at 07:10

## 2018-06-20 RX ADMIN — AMITRIPTYLINE HYDROCHLORIDE 25 MG: 50 TABLET, FILM COATED ORAL at 01:14

## 2018-06-20 RX ADMIN — OXYCODONE AND ACETAMINOPHEN 1 TABLET: 5; 325 TABLET ORAL at 07:11

## 2018-06-20 RX ADMIN — HYDROXYCHLOROQUINE SULFATE 200 MG: 200 TABLET, FILM COATED ORAL at 18:50

## 2018-06-20 RX ADMIN — BUDESONIDE 500 MCG: 0.5 INHALANT RESPIRATORY (INHALATION) at 07:42

## 2018-06-20 RX ADMIN — BUDESONIDE 500 MCG: 0.5 INHALANT RESPIRATORY (INHALATION) at 21:20

## 2018-06-20 RX ADMIN — FENTANYL CITRATE 25 MCG: 50 INJECTION, SOLUTION INTRAMUSCULAR; INTRAVENOUS at 02:45

## 2018-06-20 RX ADMIN — CARVEDILOL 25 MG: 12.5 TABLET, FILM COATED ORAL at 16:25

## 2018-06-20 RX ADMIN — CLONIDINE HYDROCHLORIDE 0.1 MG: 0.1 TABLET ORAL at 09:58

## 2018-06-20 RX ADMIN — Medication 10 ML: at 18:55

## 2018-06-20 RX ADMIN — FENTANYL CITRATE 25 MCG: 50 INJECTION, SOLUTION INTRAMUSCULAR; INTRAVENOUS at 18:55

## 2018-06-20 RX ADMIN — Medication 10 ML: at 07:12

## 2018-06-20 RX ADMIN — FENTANYL CITRATE 25 MCG: 50 INJECTION, SOLUTION INTRAMUSCULAR; INTRAVENOUS at 10:03

## 2018-06-20 NOTE — PROGRESS NOTES
Problem: Falls - Risk of  Goal: *Absence of Falls  Document Alex Fall Risk and appropriate interventions in the flowsheet.    Fall Risk Interventions:            Medication Interventions: Patient to call before getting OOB, Evaluate medications/consider consulting pharmacy

## 2018-06-20 NOTE — PROGRESS NOTES
Patient is scheduled for a Right VATS tomorrow. Upon interview patient stated she wanted [address] to resume skilled nursing services at discharge but not therapy. Orders and consult sent to [facility]. FOC and IM signed and on bedside chart.     [phone]

## 2018-06-20 NOTE — PROGRESS NOTES
Bedside and Verbal shift change report given to Shara Ferrera (oncoming nurse) by Una Medina (offgoing nurse). Report included the following information SBAR.

## 2018-06-20 NOTE — PROGRESS NOTES
Thoracic Surgery Simple Progress Note    Admit Date: 2018  POD: 3 Days Post-Op      Procedure:  Procedure(s) with comments:  INFUSION CATHETER INSERTION - central line insertion      Subjective:     Patient has complaints: No significant medical complaints    Review of Systems:    CARDIAC: positive for HTN  RESP: positive for right pleural effusion  NEURO:  negative  EXT: Denies new swelling or pain in the legs or calves. Objective:     Blood pressure (!) 133/94, pulse 79, temperature 98.1 °F (36.7 °C), resp. rate 18, height 5' 5\" (1.651 m), weight 151 lb 15.8 oz (68.9 kg), SpO2 98 %. Temp (24hrs), Av.1 °F (36.7 °C), Min:97.8 °F (36.6 °C), Max:98.3 °F (36.8 °C)        Hemodynamics    PAP    CO    CI        1901 -  0700  In: 1430 [P.O.:1170; I.V.:240]  Out: 2000     EXAM:  GENERAL: VSS, afrible, alert and cooperative  HEART:  regular rate and rhythm  LUNG: clear to auscultation bilaterally anteriorly, diminished breath sounds R base  NEURO:  normal without focal findings  mental status, speech normal, alert and oriented x iii  EXTREMITIES:No evidence of DVT seen on physical exam.  GI/: Abd soft, nonterder with + bowel sounds. ESRD on HD T//Sat    Labs:  Recent Results (from the past 24 hour(s))   METABOLIC PANEL, BASIC    Collection Time: 18  2:10 AM   Result Value Ref Range    Sodium 138 136 - 145 mmol/L    Potassium 5.1 3.5 - 5.1 mmol/L    Chloride 101 97 - 108 mmol/L    CO2 32 21 - 32 mmol/L    Anion gap 5 5 - 15 mmol/L    Glucose 95 65 - 100 mg/dL    BUN 21 (H) 6 - 20 MG/DL    Creatinine 4.54 (H) 0.55 - 1.02 MG/DL    BUN/Creatinine ratio 5 (L) 12 - 20      GFR est AA 14 (L) >60 ml/min/1.73m2    GFR est non-AA 11 (L) >60 ml/min/1.73m2    Calcium 8.3 (L) 8.5 - 10.1 MG/DL   TYPE & SCREEN    Collection Time: 18  2:10 AM   Result Value Ref Range    Crossmatch Expiration 2018     ABO/Rh(D) O POSITIVE     Antibody screen NEG        Assessment:   No evidence of DVT.   Principal Problem:    Sepsis ([facility] [address] (4/29/2018)    Active Problems:    Troponin level elevated (6/17/2018)    Right parapneumonic effusion (loculated)  Plan/Recommendations:   Scheduled for short run of HD later today  NPO after midnight   Scheduled for RVATS decortication tomorrow    See orders    Signed By: Praveen PINK

## 2018-06-20 NOTE — PROGRESS NOTES
Hospitalist Progress Note  Neftaly Stanton MD  Answering service: [phone] OR [phone] from in house phone  Cell: [phone]      Date of Service:  2018  NAME:  Wilberto Mera  :  1985  MRN:  [id number]      Admission Summary: This is a 41-year-old woman with a past medical history significant for end-stage renal disease on hemodialysis, lupus, asthma, thromboembolism, dyslipidemia, was in her usual state of health until the day of presentation at the emergency room when the patient developed shortness of breath. The shortness of breath is progressive, it is with little or no activity. Patient had routine dialysis done. After the dialysis, that is when the patient developed the shortness of breath. Patient used to be on peritoneal dialysis, but was converted to hemodialysis because of recurrent infection. The patient was last admitted to this hospital from 2018 to 2018. She was admitted and treated for sepsis, attributed to multiple abdominal abscesses. Patient underwent drainage of the abdominal abscess. Patient also underwent drainage of a right pleural effusion. She was discharged home to complete a course of Augmentin. She stated that she has been having shortness of breath on and off since she was discharged from hospital.  A day before coming to the emergency room here, she was seen at another emergency room. Patient was evaluated, but was not admitted. When the patient arrived at the emergency room, she was found to have elevated lactic acid level, low grade temperature, tachycardia. Code sepsis was called.   The chest x-ray shows evidence of pneumonia  Similar presentation last admission as well    Interval history / Subjective:     F/u Pneumonia   Planning for VATS tomorrow   Assessment & Plan:     Health care associated Pneumonia with partially loculated pleural effusion (?recurrent)  -CXR  Unchanged mild right pleural effusion with patchy opacification right base  -Vanc/Zosyn  -Follow cultures  -CT chest/abd 6/17 The pleural fluid on the right extends more superiorly in the right hemithorax and there is a more focal collection laterally measuring 5.4 cm may be partially loculated.  -Last admission patient had US guided removal with chest tube placed  -Consult thoracic surgery- For Right VATS on thursday  Consulted ID    Abdominal cavity abscess (last admission)  -CT abd 6/17 Pigtail catheter overlies collection in the pelvis anteriorly which has diminished considerably in size since 5/16/2018 and there is a small amount of residual fluid noted    Suspected sepsis  -as above  -blood culture 6/16 alpha Strep in  1/2 bottles  -Repeat blood culture 6/18  Neg so far     ESRD  -on HD (TTS)  -Appreciate Nephrology    Anemia  -likely of chronic disease  -on EPO     Lupus  -Outpatient being followed by Dr Ilan Fernandez  -hold imuran     Chronic DVT  -on Eliquis - on hold for surgery     Elevated troponin  -Likely from ESRD  - Cardiology consulted  -Echo EF 60-65% with no RWMA    Dyslipidemia  -continue home meds    Diet  Renal diet    Code status: FULL CODE  DVT prophylaxis: Eliquis  PTA: home    Plan: Continue antibiotics, follow cultures,    Care Plan discussed with: Patient/Family  Disposition: TBD   For VATS tomorrow      Hospital Problems  Date Reviewed: 6/17/2018          Codes Class Noted POA    Troponin level elevated ICD-10-CM: R74.8  ICD-9-CM: 790.6  6/17/2018 Unknown        * (Principal)Sepsis ([facility] [address] ICD-10-CM: A41.9  ICD-9-CM: 038.9, 995.91  4/29/2018 Yes                Review of Systems:   A comprehensive review of systems was negative except for that written in the HPI. Vital Signs:    Last 24hrs VS reviewed since prior progress note.  Most recent are:  Visit Vitals    BP (!) 133/94 (BP 1 Location: Left arm, BP Patient Position: At rest)    Pulse 79    Temp 98.1 °F (36.7 °C)    Resp 18    Ht 5' 5\" (1.651 m)    Wt 68.9 kg (151 lb 15.8 oz)    SpO2 98%    BMI 25.29 kg/m2         Intake/Output Summary (Last 24 hours) at 06/20/18 1133  Last data filed at 06/19/18 1448   Gross per 24 hour   Intake              580 ml   Output                0 ml   Net              580 ml        Physical Examination:             Constitutional:  No acute distress, cooperative, pleasant    ENT:  Oral mucous moist, oropharynx benign. Neck supple,    Resp:  Bilateral diminished breath sounds. No accessory muscle use   CV:  Regular rhythm, normal rate, no murmurs, gallops, rubs    GI:  Soft, non distended, non tender. normoactive bowel sounds, no hepatosplenomegaly, drain in place    Musculoskeletal:  No edema, warm, 2+ pulses throughout,Right AV fistula in place    Neurologic:  Moves all extremities. AAOx3, CN II-XII reviewed     Skin:  Good turgor, no rashes or ulcers       Data Review:    Review and/or order of clinical lab test      Labs:     Recent Labs      06/19/18   0315 06/17/18   1500   WBC  3.1*  3.2*   HGB  8.3*  8.6*   HCT  27.8*  29.2*   PLT  163  148*     Recent Labs      06/20/18   0210 06/19/18   0315  06/18/18   0210  06/17/18   1500   NA  138  139  138  139   K  5.1  4.8  4.4  4.3   CL  101  101  100  99   CO2  32  30  29  32   BUN  21*  33*  27*  20   CREA  4.54*  6.14*  4.70*  4.13*   GLU  95  98  105*  93   CA  8.3*  8.3*  8.4*  8.5   MG   --    --   1.6  1.7   PHOS   --    --   2.8  3.0     Recent Labs      06/17/18   1500   AML  112   LPSE  88     No results for input(s): INR, PTP, APTT in the last 72 hours. No lab exists for component: INREXT, INREXT   Recent Labs      06/17/18   1500   TIBC  137*   PSAT  25   FERR  4182*      Lab Results   Component Value Date/Time    Folate 13.7 06/17/2018 03:00 PM      No results for input(s): PH, PCO2, PO2 in the last 72 hours.   Recent Labs      06/18/18   0210  06/17/18   1500   CPK  25*  22*   CKNDX  Cannot be calculated  Cannot be calculated   TROIQ   -- 0.06*     Lab Results   Component Value Date/Time    Cholesterol, total 117 09/25/2015 02:02 PM    HDL Cholesterol 31 (L) 09/25/2015 02:02 PM    LDL, calculated 57 09/25/2015 02:02 PM    Triglyceride 146 09/25/2015 02:02 PM    CHOL/HDL Ratio 7.7 (H) 05/31/2009 10:30 AM     Lab Results   Component Value Date/Time    Glucose (POC) 123 (H) 04/20/2018 05:33 PM    Glucose (POC) 95 04/20/2018 11:19 AM    Glucose (POC) 100 04/20/2018 07:18 AM    Glucose (POC) 119 (H) 04/19/2018 09:11 PM    Glucose (POC) 86 04/19/2018 04:30 PM     Lab Results   Component Value Date/Time    Color YELLOW/STRAW 08/12/2016 09:06 PM    Appearance CLEAR 08/12/2016 09:06 PM    Specific gravity 1.017 08/12/2016 09:06 PM    Specific gravity 1.010 07/11/2016 12:44 PM    pH (UA) 7.0 08/12/2016 09:06 PM    Protein 300 (A) 08/12/2016 09:06 PM    Glucose NEGATIVE  08/12/2016 09:06 PM    Ketone TRACE (A) 08/12/2016 09:06 PM    Bilirubin NEGATIVE  07/11/2016 12:44 PM    Urobilinogen 1.0 08/12/2016 09:06 PM    Nitrites NEGATIVE  08/12/2016 09:06 PM    Leukocyte Esterase NEGATIVE  08/12/2016 09:06 PM    Epithelial cells MODERATE (A) 08/12/2016 09:06 PM    Bacteria 1+ (A) 08/12/2016 09:06 PM    WBC 0-4 08/12/2016 09:06 PM    RBC 0-5 08/12/2016 09:06 PM         Medications Reviewed:     Current Facility-Administered Medications   Medication Dose Route Frequency    Vancomycin random- 6/20; please draw at the beginning of dialysis   Other DIALYSIS ONCE    epoetin pia (EPOGEN;PROCRIT) injection 8,000 Units  8,000 Units SubCUTAneous DIALYSIS TUE, THU & SAT    vancomycin (VANCOCIN) 500 mg in 0.9% sodium chloride (MBP/ADV) 100 mL  500 mg IntraVENous DIALYSIS TUE, THU & SAT    acetaminophen (TYLENOL) tablet 650 mg  650 mg Oral Q4H PRN    oxyCODONE-acetaminophen (PERCOCET) 5-325 mg per tablet 1 Tab  1 Tab Oral Q4H PRN    albuterol-ipratropium (DUO-NEB) 2.5 MG-0.5 MG/3 ML  3 mL Nebulization Q4H PRN    piperacillin-tazobactam (ZOSYN) 3.375 g in 0.9% sodium chloride (MBP/ADV) 100 mL  3.375 g IntraVENous Q12H    Vancomycin Pharmacy Dosing   Other Rx Dosing/Monitoring    allopurinol (ZYLOPRIM) tablet 100 mg  100 mg Oral DAILY AFTER BREAKFAST    amitriptyline (ELAVIL) tablet 25 mg  25 mg Oral QHS PRN    amLODIPine (NORVASC) tablet 10 mg  10 mg Oral DAILY    cyanocobalamin (VITAMIN B12) tablet 2,500 mcg  2,500 mcg Oral DAILY    gemfibrozil (LOPID) tablet 300 mg  300 mg Oral DAILY    hydroxychloroquine (PLAQUENIL) tablet 200 mg  200 mg Oral BID    pantoprazole (PROTONIX) tablet 40 mg  40 mg Oral ACB    polyethylene glycol (MIRALAX) packet 17 g  17 g Oral DAILY    predniSONE (DELTASONE) tablet 5 mg  5 mg Oral DAILY    senna (SENOKOT) tablet 8.6 mg  1 Tab Oral DAILY PRN    sodium chloride (NS) flush 5-10 mL  5-10 mL IntraVENous Q8H    sodium chloride (NS) flush 5-10 mL  5-10 mL IntraVENous PRN    ondansetron (ZOFRAN) injection 4 mg  4 mg IntraVENous Q4H PRN    fentaNYL citrate (PF) injection 25 mcg  25 mcg IntraVENous Q4H PRN    arformoterol (BROVANA) neb solution 15 mcg  15 mcg Nebulization BID RT    And    budesonide (PULMICORT) 500 mcg/2 ml nebulizer suspension  500 mcg Nebulization BID RT    hydrALAZINE (APRESOLINE) 20 mg/mL injection 10 mg  10 mg IntraVENous Q6H PRN    carvedilol (COREG) tablet 25 mg  25 mg Oral BID WITH MEALS    losartan (COZAAR) tablet 50 mg  50 mg Oral DAILY    cloNIDine HCl (CATAPRES) tablet 0.1 mg  0.1 mg Oral BID    midazolam (VERSED) injection 1 mg  1 mg IntraVENous Multiple     ______________________________________________________________________  EXPECTED LENGTH OF STAY: 4d 21h  ACTUAL LENGTH OF STAY:          3                 Yojana Blanca MD

## 2018-06-20 NOTE — PROGRESS NOTES
Pharmacist Note - Vancomycin Dosing  Therapy day 4  Indication: HCAP/GPC bacteremia  Current regimen:  500 mg IV T,T,S with HD     A pre-dialysis level resulted at 19.7 mcg/mL    Goal trough = 20 - 25 mcg/mL for therapeutic goal of 15 - 20 mcg/mL (assuming ~35% removal by dialysis)    Plan: Vancomycin 500 mg x1 dose after today's dialysis   Pharmacy will continue to monitor this patient daily for changes in clinical status and renal function.

## 2018-06-20 NOTE — PROGRESS NOTES
Spiritual Care Partner Volunteer visited patient in Rm 607 on 6/20/2018.   Documented by:  Chaplain Reynoso MDiv, MS, [address] ([id number])

## 2018-06-20 NOTE — DIALYSIS
[facility]  [phone]    Vitals   Pre   Post   Assessment   Pre   Post     Temp  98.5  98.6 LOC  A&O x3 A&O x3   HR   84 81 Lungs   clear  clear   B/P  128/97 141/101 Cardiac   regular  regular   Resp   18 18 Skin   Dry, warm  dry, warm   Pain level  0 0 Edema  LE B/L     traces   Orders:    Duration:   Start:    1720 End:    1920 Total:   2 hrs   Dialyzer:   Dialyzer/Set Up Inspection: Reginoar ([id number]) (06/20/18 1715)   K Bath:   Dialysate K (mEq/L): 2 (06/20/18 1715)   Ca Bath:   Dialysate CA (mEq/L): 2.5 (06/20/18 1715)   Na/Bicarb:   Dialysate NA (mEq/L): 140 (06/20/18 1715)   Target Fluid Removal:   Goal/Amount of Fluid to Remove (mL): 1500 mL (06/20/18 1715)   Access     Type & Location:   JAZMYN AVG: +B&T, no S&S of infection, site cleaned with alcohol, cannulated with 15 G 1 \" needles x2, +flashes/aspir/NS flushes   Labs     Obtained/Reviewed   Critical Results Called   Date when labs were drawn-  Hgb-    HGB   Date Value Ref Range Status   06/19/2018 8.3 (L) 11.5 - 16.0 g/dL Final     K-    Potassium   Date Value Ref Range Status   06/20/2018 5.1 3.5 - 5.1 mmol/L Final     Ca-   Calcium   Date Value Ref Range Status   06/20/2018 8.3 (L) 8.5 - 10.1 MG/DL Final     Bun-   BUN   Date Value Ref Range Status   06/20/2018 21 (H) 6 - 20 MG/DL Final     Creat-   Creatinine   Date Value Ref Range Status   06/20/2018 4.54 (H) 0.55 - 1.02 MG/DL Final     Comment:     INVESTIGATED PER DELTA CHECK PROTOCOL        Medications/ Blood Products Given     Name   Dose   Route and Time           none          Blood Volume Processed (BVP):    40 L Net Fluid   Removed:  1500 cc   Comments   Time Out Done: 1896  Primary Nurse Rpt Pre: Sudhir Neff RN  Primary Nurse Rpt Post: Jorge Wil[place]ton, RN  Pt Education: access care, procedure  Care Plan: continue HD tx as per MD order  Tx Summary: tolerated tx well, at the end remaining blood in circuit returned with 300 cc NS, cannulas removed x2, pressure held until hemostasis obtained, about 8 min on each site, +B&T still noted, dressing applied. Admiting Diagnosis:  Pt's previous clinic-  Consent signed - Informed Consent Verified: Yes (06/20/18 1715)  [facility] Consent - yes  Hepatitis Status- Hep B Ag neg 12/26/17                              Hep B AB  10  01/11/18  Machine #- Machine Number: [id number] (06/20/18 1715)  Telemetry status-  Pre-dialysis wt. - Pre-Dialysis Weight: 68.3 kg (150 lb 9.2 oz) (06/19/18 0645)

## 2018-06-20 NOTE — PROGRESS NOTES
NAME: Heath Hernandez        :  1985        MRN:  [id number]        Assessment :    Plan:  - End-stage renal disease - TTS - [facility]     SLE    Anemia    PNA     --Short Hd today and then reassess in AM.. Continue EPO. Subjective:     Chief Complaint:  \" I feel fine. \"  No dyspnea. No cough. No N/V. No HA. Review of Systems:    Symptom Y/N Comments  Symptom Y/N Comments   Fever/Chills    Chest Pain     Poor Appetite    Edema     Cough    Abdominal Pain     Sputum    Joint Pain     SOB/IVY    Pruritis/Rash     Nausea/vomit    Tolerating PT/OT     Diarrhea    Tolerating Diet     Constipation    Other       Could not obtain due to:      Objective:     VITALS:   Last 24hrs VS reviewed since prior progress note.  Most recent are:  Visit Vitals    BP (!) 141/94 (BP 1 Location: Left arm, BP Patient Position: At rest)    Pulse 83    Temp 98 °F (36.7 °C)    Resp 18    Ht 5' 5\" (1.651 m)    Wt 68.9 kg (151 lb 15.8 oz)    SpO2 98%    BMI 25.29 kg/m2       Intake/Output Summary (Last 24 hours) at 18 1444  Last data filed at 18 1425   Gross per 24 hour   Intake              580 ml   Output               45 ml   Net              535 ml      Telemetry Reviewed:     PHYSICAL EXAM:  General: NAD  CTA  No edema      Lab Data Reviewed: (see below)    Medications Reviewed: (see below)    PMH/SH reviewed - no change compared to H&P  ________________________________________________________________________  Care Plan discussed with:  Patient     Family      RN     Care Manager                    Consultant:          Comments   >50% of visit spent in counseling and coordination of care       ________________________________________________________________________  Michelet Jj MD     Procedures: see electronic medical records for all procedures/Xrays and details which  were not copied into this note but were reviewed prior to creation of Plan. LABS:  Recent Labs      06/19/18   0315  06/17/18   1500   WBC  3.1*  3.2*   HGB  8.3*  8.6*   HCT  27.8*  29.2*   PLT  163  148*     Recent Labs      06/20/18   0210  06/19/18   0315  06/18/18   0210  06/17/18   1500   NA  138  139  138  139   K  5.1  4.8  4.4  4.3   CL  101  101  100  99   CO2  32  30  29  32   BUN  21*  33*  27*  20   CREA  4.54*  6.14*  4.70*  4.13*   GLU  95  98  105*  93   CA  8.3*  8.3*  8.4*  8.5   MG   --    --   1.6  1.7   PHOS   --    --   2.8  3.0     Recent Labs      06/17/18   1500   AML  112   LPSE  88     No results for input(s): INR, PTP, APTT in the last 72 hours. No lab exists for component: INREXT, INREXT   Recent Labs      06/17/18   1500   TIBC  137*   PSAT  25   FERR  4182*      Lab Results   Component Value Date/Time    Folate 13.7 06/17/2018 03:00 PM      No results for input(s): PH, PCO2, PO2 in the last 72 hours.   Recent Labs      06/18/18   0210  06/17/18   1500   CPK  25*  22*   CKMB  <1.0  <1.0     No components found for: [place]  Lab Results   Component Value Date/Time    Color YELLOW/STRAW 08/12/2016 09:06 PM    Appearance CLEAR 08/12/2016 09:06 PM    Specific gravity 1.017 08/12/2016 09:06 PM    Specific gravity 1.010 07/11/2016 12:44 PM    pH (UA) 7.0 08/12/2016 09:06 PM    Protein 300 (A) 08/12/2016 09:06 PM    Glucose NEGATIVE  08/12/2016 09:06 PM    Ketone TRACE (A) 08/12/2016 09:06 PM    Bilirubin NEGATIVE  07/11/2016 12:44 PM    Urobilinogen 1.0 08/12/2016 09:06 PM    Nitrites NEGATIVE  08/12/2016 09:06 PM    Leukocyte Esterase NEGATIVE  08/12/2016 09:06 PM    Epithelial cells MODERATE (A) 08/12/2016 09:06 PM    Bacteria 1+ (A) 08/12/2016 09:06 PM    WBC 0-4 08/12/2016 09:06 PM    RBC 0-5 08/12/2016 09:06 PM       MEDICATIONS:  Current Facility-Administered Medications   Medication Dose Route Frequency    Vancomycin random- 6/20; please draw at the beginning of dialysis   Other DIALYSIS ONCE    epoetin pia (EPOGEN;PROCRIT) injection 8,000 Units  8,000 Units SubCUTAneous DIALYSIS TUE, THU & SAT    vancomycin (VANCOCIN) 500 mg in 0.9% sodium chloride (MBP/ADV) 100 mL  500 mg IntraVENous DIALYSIS TUE, THU & SAT    acetaminophen (TYLENOL) tablet 650 mg  650 mg Oral Q4H PRN    oxyCODONE-acetaminophen (PERCOCET) 5-325 mg per tablet 1 Tab  1 Tab Oral Q4H PRN    albuterol-ipratropium (DUO-NEB) 2.5 MG-0.5 MG/3 ML  3 mL Nebulization Q4H PRN    piperacillin-tazobactam (ZOSYN) 3.375 g in 0.9% sodium chloride (MBP/ADV) 100 mL  3.375 g IntraVENous Q12H    Vancomycin Pharmacy Dosing   Other Rx Dosing/Monitoring    allopurinol (ZYLOPRIM) tablet 100 mg  100 mg Oral DAILY AFTER BREAKFAST    amitriptyline (ELAVIL) tablet 25 mg  25 mg Oral QHS PRN    amLODIPine (NORVASC) tablet 10 mg  10 mg Oral DAILY    cyanocobalamin (VITAMIN B12) tablet 2,500 mcg  2,500 mcg Oral DAILY    gemfibrozil (LOPID) tablet 300 mg  300 mg Oral DAILY    hydroxychloroquine (PLAQUENIL) tablet 200 mg  200 mg Oral BID    pantoprazole (PROTONIX) tablet 40 mg  40 mg Oral ACB    polyethylene glycol (MIRALAX) packet 17 g  17 g Oral DAILY    predniSONE (DELTASONE) tablet 5 mg  5 mg Oral DAILY    senna (SENOKOT) tablet 8.6 mg  1 Tab Oral DAILY PRN    sodium chloride (NS) flush 5-10 mL  5-10 mL IntraVENous Q8H    sodium chloride (NS) flush 5-10 mL  5-10 mL IntraVENous PRN    ondansetron (ZOFRAN) injection 4 mg  4 mg IntraVENous Q4H PRN    fentaNYL citrate (PF) injection 25 mcg  25 mcg IntraVENous Q4H PRN    arformoterol (BROVANA) neb solution 15 mcg  15 mcg Nebulization BID RT    And    budesonide (PULMICORT) 500 mcg/2 ml nebulizer suspension  500 mcg Nebulization BID RT    hydrALAZINE (APRESOLINE) 20 mg/mL injection 10 mg  10 mg IntraVENous Q6H PRN    carvedilol (COREG) tablet 25 mg  25 mg Oral BID WITH MEALS    losartan (COZAAR) tablet 50 mg  50 mg Oral DAILY    cloNIDine HCl (CATAPRES) tablet 0.1 mg  0.1 mg Oral BID    midazolam (VERSED) injection 1 mg  1 mg IntraVENous Multiple

## 2018-06-21 ENCOUNTER — ANESTHESIA (OUTPATIENT)
Dept: SURGERY | Age: 33
DRG: 853 | End: 2018-06-21
Payer: MEDICARE

## 2018-06-21 ENCOUNTER — APPOINTMENT (OUTPATIENT)
Dept: GENERAL RADIOLOGY | Age: 33
DRG: 853 | End: 2018-06-21
Attending: THORACIC SURGERY (CARDIOTHORACIC VASCULAR SURGERY)
Payer: MEDICARE

## 2018-06-21 LAB
ANION GAP SERPL CALC-SCNC: 6 MMOL/L (ref 5–15)
BUN SERPL-MCNC: 24 MG/DL (ref 6–20)
BUN/CREAT SERPL: 6 (ref 12–20)
CALCIUM SERPL-MCNC: 8.3 MG/DL (ref 8.5–10.1)
CHLORIDE SERPL-SCNC: 104 MMOL/L (ref 97–108)
CO2 SERPL-SCNC: 30 MMOL/L (ref 21–32)
CREAT SERPL-MCNC: 4.34 MG/DL (ref 0.55–1.02)
GLUCOSE SERPL-MCNC: 100 MG/DL (ref 65–100)
POTASSIUM SERPL-SCNC: 4.6 MMOL/L (ref 3.5–5.1)
SODIUM SERPL-SCNC: 140 MMOL/L (ref 136–145)

## 2018-06-21 PROCEDURE — 77030002996 HC SUT SLK J&J -A: Performed by: THORACIC SURGERY (CARDIOTHORACIC VASCULAR SURGERY)

## 2018-06-21 PROCEDURE — 74011250636 HC RX REV CODE- 250/636: Performed by: ANESTHESIOLOGY

## 2018-06-21 PROCEDURE — 77030011640 HC PAD GRND REM COVD -A: Performed by: THORACIC SURGERY (CARDIOTHORACIC VASCULAR SURGERY)

## 2018-06-21 PROCEDURE — 76010000171 HC OR TIME 2 TO 2.5 HR INTENSV-TIER 1: Performed by: THORACIC SURGERY (CARDIOTHORACIC VASCULAR SURGERY)

## 2018-06-21 PROCEDURE — 74011250636 HC RX REV CODE- 250/636: Performed by: HOSPITALIST

## 2018-06-21 PROCEDURE — 74011000250 HC RX REV CODE- 250

## 2018-06-21 PROCEDURE — 3331090002 HH PPS REVENUE DEBIT

## 2018-06-21 PROCEDURE — 65660000000 HC RM CCU STEPDOWN

## 2018-06-21 PROCEDURE — 77030003666 HC NDL SPINAL BD -A: Performed by: THORACIC SURGERY (CARDIOTHORACIC VASCULAR SURGERY)

## 2018-06-21 PROCEDURE — 74011250636 HC RX REV CODE- 250/636: Performed by: NURSE PRACTITIONER

## 2018-06-21 PROCEDURE — 77030011264 HC ELECTRD BLD EXT COVD -A: Performed by: THORACIC SURGERY (CARDIOTHORACIC VASCULAR SURGERY)

## 2018-06-21 PROCEDURE — 87102 FUNGUS ISOLATION CULTURE: CPT | Performed by: EMERGENCY MEDICINE

## 2018-06-21 PROCEDURE — 88305 TISSUE EXAM BY PATHOLOGIST: CPT | Performed by: THORACIC SURGERY (CARDIOTHORACIC VASCULAR SURGERY)

## 2018-06-21 PROCEDURE — 87205 SMEAR GRAM STAIN: CPT | Performed by: EMERGENCY MEDICINE

## 2018-06-21 PROCEDURE — 74011000258 HC RX REV CODE- 258: Performed by: INTERNAL MEDICINE

## 2018-06-21 PROCEDURE — 74011250637 HC RX REV CODE- 250/637: Performed by: INTERNAL MEDICINE

## 2018-06-21 PROCEDURE — 71045 X-RAY EXAM CHEST 1 VIEW: CPT

## 2018-06-21 PROCEDURE — 36415 COLL VENOUS BLD VENIPUNCTURE: CPT | Performed by: HOSPITALIST

## 2018-06-21 PROCEDURE — 74011000250 HC RX REV CODE- 250: Performed by: INTERNAL MEDICINE

## 2018-06-21 PROCEDURE — 77030020053 HC ELECTRD LAPSCP COVD -B: Performed by: THORACIC SURGERY (CARDIOTHORACIC VASCULAR SURGERY)

## 2018-06-21 PROCEDURE — 77030013079 HC BLNKT BAIR HGGR 3M -A: Performed by: ANESTHESIOLOGY

## 2018-06-21 PROCEDURE — 0BNF4ZZ RELEASE RIGHT LOWER LUNG LOBE, PERCUTANEOUS ENDOSCOPIC APPROACH: ICD-10-PCS | Performed by: THORACIC SURGERY (CARDIOTHORACIC VASCULAR SURGERY)

## 2018-06-21 PROCEDURE — 77030031139 HC SUT VCRL2 J&J -A: Performed by: THORACIC SURGERY (CARDIOTHORACIC VASCULAR SURGERY)

## 2018-06-21 PROCEDURE — 76060000035 HC ANESTHESIA 2 TO 2.5 HR: Performed by: THORACIC SURGERY (CARDIOTHORACIC VASCULAR SURGERY)

## 2018-06-21 PROCEDURE — 74011000258 HC RX REV CODE- 258: Performed by: HOSPITALIST

## 2018-06-21 PROCEDURE — 87075 CULTR BACTERIA EXCEPT BLOOD: CPT | Performed by: EMERGENCY MEDICINE

## 2018-06-21 PROCEDURE — 74011250636 HC RX REV CODE- 250/636

## 2018-06-21 PROCEDURE — 74011250636 HC RX REV CODE- 250/636: Performed by: THORACIC SURGERY (CARDIOTHORACIC VASCULAR SURGERY)

## 2018-06-21 PROCEDURE — 74011250637 HC RX REV CODE- 250/637: Performed by: SPECIALIST

## 2018-06-21 PROCEDURE — 3331090001 HH PPS REVENUE CREDIT

## 2018-06-21 PROCEDURE — 94640 AIRWAY INHALATION TREATMENT: CPT

## 2018-06-21 PROCEDURE — C1729 CATH, DRAINAGE: HCPCS | Performed by: THORACIC SURGERY (CARDIOTHORACIC VASCULAR SURGERY)

## 2018-06-21 PROCEDURE — 74011250637 HC RX REV CODE- 250/637: Performed by: THORACIC SURGERY (CARDIOTHORACIC VASCULAR SURGERY)

## 2018-06-21 PROCEDURE — 77030018836 HC SOL IRR NACL ICUM -A: Performed by: THORACIC SURGERY (CARDIOTHORACIC VASCULAR SURGERY)

## 2018-06-21 PROCEDURE — 74011250636 HC RX REV CODE- 250/636: Performed by: INTERNAL MEDICINE

## 2018-06-21 PROCEDURE — 90935 HEMODIALYSIS ONE EVALUATION: CPT

## 2018-06-21 PROCEDURE — 80048 BASIC METABOLIC PNL TOTAL CA: CPT | Performed by: HOSPITALIST

## 2018-06-21 PROCEDURE — 74011000250 HC RX REV CODE- 250: Performed by: THORACIC SURGERY (CARDIOTHORACIC VASCULAR SURGERY)

## 2018-06-21 PROCEDURE — 76210000016 HC OR PH I REC 1 TO 1.5 HR: Performed by: THORACIC SURGERY (CARDIOTHORACIC VASCULAR SURGERY)

## 2018-06-21 PROCEDURE — 77030018673: Performed by: THORACIC SURGERY (CARDIOTHORACIC VASCULAR SURGERY)

## 2018-06-21 PROCEDURE — 77030012390 HC DRN CHST BTL GTNG -B: Performed by: THORACIC SURGERY (CARDIOTHORACIC VASCULAR SURGERY)

## 2018-06-21 PROCEDURE — 77030032490 HC SLV COMPR SCD KNE COVD -B: Performed by: THORACIC SURGERY (CARDIOTHORACIC VASCULAR SURGERY)

## 2018-06-21 PROCEDURE — 87116 MYCOBACTERIA CULTURE: CPT | Performed by: EMERGENCY MEDICINE

## 2018-06-21 PROCEDURE — 77030039266 HC ADH SKN EXOFIN S2SG -A: Performed by: THORACIC SURGERY (CARDIOTHORACIC VASCULAR SURGERY)

## 2018-06-21 RX ORDER — HYDROCORTISONE SODIUM SUCCINATE 100 MG/2ML
100 INJECTION, POWDER, FOR SOLUTION INTRAMUSCULAR; INTRAVENOUS ONCE
Status: COMPLETED | OUTPATIENT
Start: 2018-06-21 | End: 2018-06-21

## 2018-06-21 RX ORDER — OXYCODONE AND ACETAMINOPHEN 5; 325 MG/1; MG/1
1 TABLET ORAL AS NEEDED
Status: DISCONTINUED | OUTPATIENT
Start: 2018-06-21 | End: 2018-06-21 | Stop reason: HOSPADM

## 2018-06-21 RX ORDER — DIPHENHYDRAMINE HYDROCHLORIDE 50 MG/ML
12.5 INJECTION, SOLUTION INTRAMUSCULAR; INTRAVENOUS AS NEEDED
Status: DISCONTINUED | OUTPATIENT
Start: 2018-06-21 | End: 2018-06-21 | Stop reason: HOSPADM

## 2018-06-21 RX ORDER — SODIUM CHLORIDE 9 MG/ML
INJECTION, SOLUTION INTRAVENOUS
Status: DISCONTINUED | OUTPATIENT
Start: 2018-06-21 | End: 2018-06-21 | Stop reason: HOSPADM

## 2018-06-21 RX ORDER — SODIUM CHLORIDE 9 MG/ML
25 INJECTION, SOLUTION INTRAVENOUS CONTINUOUS
Status: DISCONTINUED | OUTPATIENT
Start: 2018-06-21 | End: 2018-06-21 | Stop reason: HOSPADM

## 2018-06-21 RX ORDER — SODIUM CHLORIDE 0.9 % (FLUSH) 0.9 %
5-10 SYRINGE (ML) INJECTION EVERY 8 HOURS
Status: DISCONTINUED | OUTPATIENT
Start: 2018-06-21 | End: 2018-06-28 | Stop reason: HOSPADM

## 2018-06-21 RX ORDER — HYDROMORPHONE HYDROCHLORIDE 1 MG/ML
0.5 INJECTION, SOLUTION INTRAMUSCULAR; INTRAVENOUS; SUBCUTANEOUS ONCE
Status: COMPLETED | OUTPATIENT
Start: 2018-06-21 | End: 2018-06-21

## 2018-06-21 RX ORDER — SODIUM CHLORIDE 0.9 % (FLUSH) 0.9 %
5-10 SYRINGE (ML) INJECTION EVERY 8 HOURS
Status: DISCONTINUED | OUTPATIENT
Start: 2018-06-21 | End: 2018-06-21 | Stop reason: HOSPADM

## 2018-06-21 RX ORDER — FENTANYL CITRATE 50 UG/ML
50 INJECTION, SOLUTION INTRAMUSCULAR; INTRAVENOUS AS NEEDED
Status: DISCONTINUED | OUTPATIENT
Start: 2018-06-21 | End: 2018-06-21 | Stop reason: HOSPADM

## 2018-06-21 RX ORDER — SODIUM CHLORIDE, SODIUM LACTATE, POTASSIUM CHLORIDE, CALCIUM CHLORIDE 600; 310; 30; 20 MG/100ML; MG/100ML; MG/100ML; MG/100ML
125 INJECTION, SOLUTION INTRAVENOUS CONTINUOUS
Status: DISCONTINUED | OUTPATIENT
Start: 2018-06-21 | End: 2018-06-21 | Stop reason: HOSPADM

## 2018-06-21 RX ORDER — KETOROLAC TROMETHAMINE 30 MG/ML
30 INJECTION, SOLUTION INTRAMUSCULAR; INTRAVENOUS EVERY 6 HOURS
Status: DISCONTINUED | OUTPATIENT
Start: 2018-06-21 | End: 2018-06-21 | Stop reason: DRUGHIGH

## 2018-06-21 RX ORDER — FENTANYL CITRATE 50 UG/ML
50 INJECTION, SOLUTION INTRAMUSCULAR; INTRAVENOUS
Status: DISCONTINUED | OUTPATIENT
Start: 2018-06-21 | End: 2018-06-25

## 2018-06-21 RX ORDER — ONDANSETRON 2 MG/ML
INJECTION INTRAMUSCULAR; INTRAVENOUS AS NEEDED
Status: DISCONTINUED | OUTPATIENT
Start: 2018-06-21 | End: 2018-06-21 | Stop reason: HOSPADM

## 2018-06-21 RX ORDER — SODIUM CHLORIDE 0.9 % (FLUSH) 0.9 %
5-10 SYRINGE (ML) INJECTION AS NEEDED
Status: DISCONTINUED | OUTPATIENT
Start: 2018-06-21 | End: 2018-06-28 | Stop reason: HOSPADM

## 2018-06-21 RX ORDER — PHENYLEPHRINE HCL IN 0.9% NACL 0.4MG/10ML
SYRINGE (ML) INTRAVENOUS AS NEEDED
Status: DISCONTINUED | OUTPATIENT
Start: 2018-06-21 | End: 2018-06-21 | Stop reason: HOSPADM

## 2018-06-21 RX ORDER — MIDAZOLAM HYDROCHLORIDE 1 MG/ML
0.5 INJECTION, SOLUTION INTRAMUSCULAR; INTRAVENOUS
Status: DISCONTINUED | OUTPATIENT
Start: 2018-06-21 | End: 2018-06-21 | Stop reason: HOSPADM

## 2018-06-21 RX ORDER — ONDANSETRON 2 MG/ML
4 INJECTION INTRAMUSCULAR; INTRAVENOUS AS NEEDED
Status: DISCONTINUED | OUTPATIENT
Start: 2018-06-21 | End: 2018-06-21 | Stop reason: HOSPADM

## 2018-06-21 RX ORDER — NALOXONE HYDROCHLORIDE 0.4 MG/ML
0.4 INJECTION, SOLUTION INTRAMUSCULAR; INTRAVENOUS; SUBCUTANEOUS AS NEEDED
Status: DISCONTINUED | OUTPATIENT
Start: 2018-06-21 | End: 2018-06-28 | Stop reason: HOSPADM

## 2018-06-21 RX ORDER — SENNOSIDES 8.6 MG/1
1 TABLET ORAL
Status: DISCONTINUED | OUTPATIENT
Start: 2018-06-21 | End: 2018-06-28 | Stop reason: HOSPADM

## 2018-06-21 RX ORDER — LIDOCAINE HYDROCHLORIDE 20 MG/ML
INJECTION, SOLUTION EPIDURAL; INFILTRATION; INTRACAUDAL; PERINEURAL AS NEEDED
Status: DISCONTINUED | OUTPATIENT
Start: 2018-06-21 | End: 2018-06-21 | Stop reason: HOSPADM

## 2018-06-21 RX ORDER — MORPHINE SULFATE 4 MG/ML
INJECTION INTRAVENOUS
Status: COMPLETED
Start: 2018-06-21 | End: 2018-06-21

## 2018-06-21 RX ORDER — SODIUM CHLORIDE 0.9 % (FLUSH) 0.9 %
5-10 SYRINGE (ML) INJECTION AS NEEDED
Status: DISCONTINUED | OUTPATIENT
Start: 2018-06-21 | End: 2018-06-21 | Stop reason: HOSPADM

## 2018-06-21 RX ORDER — KETOROLAC TROMETHAMINE 30 MG/ML
15 INJECTION, SOLUTION INTRAMUSCULAR; INTRAVENOUS EVERY 6 HOURS
Status: DISPENSED | OUTPATIENT
Start: 2018-06-21 | End: 2018-06-24

## 2018-06-21 RX ORDER — MIDAZOLAM HYDROCHLORIDE 1 MG/ML
1 INJECTION, SOLUTION INTRAMUSCULAR; INTRAVENOUS AS NEEDED
Status: DISCONTINUED | OUTPATIENT
Start: 2018-06-21 | End: 2018-06-21 | Stop reason: HOSPADM

## 2018-06-21 RX ORDER — OXYCODONE AND ACETAMINOPHEN 5; 325 MG/1; MG/1
2 TABLET ORAL
Status: DISCONTINUED | OUTPATIENT
Start: 2018-06-21 | End: 2018-06-25

## 2018-06-21 RX ORDER — LIDOCAINE HYDROCHLORIDE 10 MG/ML
0.1 INJECTION, SOLUTION EPIDURAL; INFILTRATION; INTRACAUDAL; PERINEURAL AS NEEDED
Status: DISCONTINUED | OUTPATIENT
Start: 2018-06-21 | End: 2018-06-21 | Stop reason: HOSPADM

## 2018-06-21 RX ORDER — MORPHINE SULFATE 10 MG/ML
2 INJECTION, SOLUTION INTRAMUSCULAR; INTRAVENOUS
Status: DISCONTINUED | OUTPATIENT
Start: 2018-06-21 | End: 2018-06-21 | Stop reason: HOSPADM

## 2018-06-21 RX ORDER — PROPOFOL 10 MG/ML
INJECTION, EMULSION INTRAVENOUS AS NEEDED
Status: DISCONTINUED | OUTPATIENT
Start: 2018-06-21 | End: 2018-06-21 | Stop reason: HOSPADM

## 2018-06-21 RX ORDER — DOCUSATE SODIUM 100 MG/1
100 CAPSULE, LIQUID FILLED ORAL 2 TIMES DAILY
Status: DISCONTINUED | OUTPATIENT
Start: 2018-06-21 | End: 2018-06-28 | Stop reason: HOSPADM

## 2018-06-21 RX ORDER — ROCURONIUM BROMIDE 10 MG/ML
INJECTION, SOLUTION INTRAVENOUS AS NEEDED
Status: DISCONTINUED | OUTPATIENT
Start: 2018-06-21 | End: 2018-06-21 | Stop reason: HOSPADM

## 2018-06-21 RX ORDER — FENTANYL CITRATE 50 UG/ML
INJECTION, SOLUTION INTRAMUSCULAR; INTRAVENOUS AS NEEDED
Status: DISCONTINUED | OUTPATIENT
Start: 2018-06-21 | End: 2018-06-21 | Stop reason: HOSPADM

## 2018-06-21 RX ORDER — FENTANYL CITRATE 50 UG/ML
25 INJECTION, SOLUTION INTRAMUSCULAR; INTRAVENOUS
Status: COMPLETED | OUTPATIENT
Start: 2018-06-21 | End: 2018-06-21

## 2018-06-21 RX ADMIN — FENTANYL CITRATE 50 MCG: 50 INJECTION, SOLUTION INTRAMUSCULAR; INTRAVENOUS at 19:07

## 2018-06-21 RX ADMIN — FENTANYL CITRATE 25 MCG: 50 INJECTION, SOLUTION INTRAMUSCULAR; INTRAVENOUS at 06:51

## 2018-06-21 RX ADMIN — HYDROCORTISONE SODIUM SUCCINATE 100 MG: 100 INJECTION, POWDER, FOR SOLUTION INTRAMUSCULAR; INTRAVENOUS at 06:58

## 2018-06-21 RX ADMIN — FENTANYL CITRATE 25 MCG: 50 INJECTION, SOLUTION INTRAMUSCULAR; INTRAVENOUS at 10:20

## 2018-06-21 RX ADMIN — PROPOFOL 100 MG: 10 INJECTION, EMULSION INTRAVENOUS at 08:08

## 2018-06-21 RX ADMIN — FENTANYL CITRATE 25 MCG: 50 INJECTION, SOLUTION INTRAMUSCULAR; INTRAVENOUS at 10:45

## 2018-06-21 RX ADMIN — CLONIDINE HYDROCHLORIDE 0.1 MG: 0.1 TABLET ORAL at 19:37

## 2018-06-21 RX ADMIN — HYDROMORPHONE HYDROCHLORIDE 0.5 MG: 1 INJECTION, SOLUTION INTRAMUSCULAR; INTRAVENOUS; SUBCUTANEOUS at 11:52

## 2018-06-21 RX ADMIN — FENTANYL CITRATE 50 MCG: 50 INJECTION, SOLUTION INTRAMUSCULAR; INTRAVENOUS at 08:38

## 2018-06-21 RX ADMIN — FENTANYL CITRATE 25 MCG: 50 INJECTION, SOLUTION INTRAMUSCULAR; INTRAVENOUS at 10:35

## 2018-06-21 RX ADMIN — PIPERACILLIN SODIUM,TAZOBACTAM SODIUM 3.38 G: 3; .375 INJECTION, POWDER, FOR SOLUTION INTRAVENOUS at 23:04

## 2018-06-21 RX ADMIN — Medication 10 ML: at 21:13

## 2018-06-21 RX ADMIN — FENTANYL CITRATE 25 MCG: 50 INJECTION, SOLUTION INTRAMUSCULAR; INTRAVENOUS at 10:30

## 2018-06-21 RX ADMIN — DOCUSATE SODIUM 100 MG: 100 CAPSULE, LIQUID FILLED ORAL at 19:37

## 2018-06-21 RX ADMIN — ARFORMOTEROL TARTRATE 15 MCG: 15 SOLUTION RESPIRATORY (INHALATION) at 20:11

## 2018-06-21 RX ADMIN — FENTANYL CITRATE 50 MCG: 50 INJECTION, SOLUTION INTRAMUSCULAR; INTRAVENOUS at 21:10

## 2018-06-21 RX ADMIN — EPOETIN ALFA 8000 UNITS: 4000 SOLUTION INTRAVENOUS; SUBCUTANEOUS at 21:09

## 2018-06-21 RX ADMIN — MORPHINE SULFATE 2 MG: 4 INJECTION INTRAVENOUS at 11:20

## 2018-06-21 RX ADMIN — OXYCODONE HYDROCHLORIDE AND ACETAMINOPHEN 1 TABLET: 5; 325 TABLET ORAL at 14:59

## 2018-06-21 RX ADMIN — Medication 80 MCG: at 08:20

## 2018-06-21 RX ADMIN — SODIUM CHLORIDE 25 ML/HR: 900 INJECTION, SOLUTION INTRAVENOUS at 06:43

## 2018-06-21 RX ADMIN — CARVEDILOL 25 MG: 12.5 TABLET, FILM COATED ORAL at 19:37

## 2018-06-21 RX ADMIN — FENTANYL CITRATE 25 MCG: 50 INJECTION, SOLUTION INTRAMUSCULAR; INTRAVENOUS at 03:35

## 2018-06-21 RX ADMIN — VANCOMYCIN HYDROCHLORIDE 500 MG: 500 INJECTION, POWDER, LYOPHILIZED, FOR SOLUTION INTRAVENOUS at 21:09

## 2018-06-21 RX ADMIN — ROCURONIUM BROMIDE 30 MG: 10 INJECTION, SOLUTION INTRAVENOUS at 08:08

## 2018-06-21 RX ADMIN — OXYCODONE HYDROCHLORIDE AND ACETAMINOPHEN 2 TABLET: 5; 325 TABLET ORAL at 23:04

## 2018-06-21 RX ADMIN — FENTANYL CITRATE 50 MCG: 50 INJECTION, SOLUTION INTRAMUSCULAR; INTRAVENOUS at 08:08

## 2018-06-21 RX ADMIN — SODIUM CHLORIDE: 9 INJECTION, SOLUTION INTRAVENOUS at 07:59

## 2018-06-21 RX ADMIN — ROCURONIUM BROMIDE 10 MG: 10 INJECTION, SOLUTION INTRAVENOUS at 08:55

## 2018-06-21 RX ADMIN — AMITRIPTYLINE HYDROCHLORIDE 25 MG: 50 TABLET, FILM COATED ORAL at 19:52

## 2018-06-21 RX ADMIN — FENTANYL CITRATE 50 MCG: 50 INJECTION, SOLUTION INTRAMUSCULAR; INTRAVENOUS at 16:31

## 2018-06-21 RX ADMIN — HYDROXYCHLOROQUINE SULFATE 200 MG: 200 TABLET, FILM COATED ORAL at 19:37

## 2018-06-21 RX ADMIN — LIDOCAINE HYDROCHLORIDE 60 MG: 20 INJECTION, SOLUTION EPIDURAL; INFILTRATION; INTRACAUDAL; PERINEURAL at 08:08

## 2018-06-21 RX ADMIN — BUDESONIDE 500 MCG: 0.5 INHALANT RESPIRATORY (INHALATION) at 20:00

## 2018-06-21 RX ADMIN — Medication 10 ML: at 05:04

## 2018-06-21 RX ADMIN — SENNOSIDES 8.6 MG: 8.6 TABLET, FILM COATED ORAL at 21:09

## 2018-06-21 RX ADMIN — Medication 10 ML: at 03:36

## 2018-06-21 RX ADMIN — FENTANYL CITRATE 50 MCG: 50 INJECTION, SOLUTION INTRAMUSCULAR; INTRAVENOUS at 09:29

## 2018-06-21 RX ADMIN — ONDANSETRON 4 MG: 2 INJECTION INTRAMUSCULAR; INTRAVENOUS at 09:31

## 2018-06-21 RX ADMIN — ROCURONIUM BROMIDE 10 MG: 10 INJECTION, SOLUTION INTRAVENOUS at 08:35

## 2018-06-21 RX ADMIN — KETOROLAC TROMETHAMINE 15 MG: 30 INJECTION, SOLUTION INTRAMUSCULAR at 19:37

## 2018-06-21 RX ADMIN — MEPERIDINE HYDROCHLORIDE 25 MG: 50 INJECTION INTRAMUSCULAR; INTRAVENOUS; SUBCUTANEOUS at 12:25

## 2018-06-21 RX ADMIN — FENTANYL CITRATE 50 MCG: 50 INJECTION, SOLUTION INTRAMUSCULAR; INTRAVENOUS at 08:32

## 2018-06-21 RX ADMIN — MORPHINE SULFATE 4 MG: 4 INJECTION INTRAVENOUS at 11:05

## 2018-06-21 RX ADMIN — MORPHINE SULFATE 2 MG: 4 INJECTION INTRAVENOUS at 13:41

## 2018-06-21 RX ADMIN — PIPERACILLIN SODIUM,TAZOBACTAM SODIUM 3.38 G: 3; .375 INJECTION, POWDER, FOR SOLUTION INTRAVENOUS at 11:40

## 2018-06-21 NOTE — OP NOTES
[address] [place] [address] REPORT    Jennifer Escobar  MR#: [id number]  : 1985  ACCOUNT #: [de-identified]   DATE OF SERVICE: 2018    CLINICAL SERVICE:  Thoracic surgery. ATTENDING SURGEON:  Kalani Membreno MD    OPERATIONS PERFORMED:  1. Right video-assisted thoracoscopy. 2.  Full pulmonary decortication of the right lower lobe. PREOPERATIVE DIAGNOSES:  1. Right loculated parapneumonic effusion. 2.  Systemic lupus. 3.  End-stage renal disease. POSTOPERATIVE DIAGNOSES:  1. Right loculated parapneumonic effusion. 2.  Systemic lupus. 3.  End-stage renal disease. FIRST ASSISTANT:  Stephani Moise    SPECIMENS SENT:  1. Right pleural peel was sent to anatomic pathology. 2.  Right pleural fluid was sent to microbiology. DRAINS AND TUBES:  One 28-Israeli chest tube was left within the right hemithorax. ANESTHESIA:  General with double-lumen endotracheal intubation. ESTIMATED BLOOD LOSS:  For this case was 100 mL. INDICATIONS FOR PROCEDURE:  The patient is a 27-year-old female with a history of lupus and end-stage renal disease. She was recently hospitalized for intra-abdominal abscesses related to a peritoneal dialysis catheter, which was removed. She was readmitted with pneumonia, dyspnea, and a complex right pleural fluid collection. She was evaluated by thoracic surgery and the decision was made to proceed to the operating room for decortication. PROCEDURE IN DETAIL:  After informed consent was obtained and placed on the chart, the patient taken to the operating room and placed supine on the operating room table. General anesthesia with double-lumen endotracheal intubation was induced without complication. Preop antibiotics were administered. The patient was then placed in the left lateral decubitus position with right side up. The patient's right chest was prepped and draped in a sterile fashion. Timeout was performed.     Through the eighth intercostal space along the anterior superior iliac crest line, a 10 mm Thoracoport was placed. Upon entering the thoracic cavity, there were dense adhesions and loculations surrounding the left lower lobe. A combination of blunt and electrocautery dissection was used to break up these loculations and adhesions. Eventually, enough space was created that a working port was placed posteriorly in the sixth intercostal space and anteriorly in the third intercostal space. The pleural fluid was then drained with sample sent to microbiology. The multiple loculations and dense adhesions were then broken up and taken down throughout the hemithorax. The right upper lobe parenchyma was normal in appearance and did not have a pleural peel. The right lower lobe parenchyma did have a pleural peel and was not expanding fully, and a full pulmonary decortication of the right lower lobe was performed. Grossly, all of these loculations looked like a chronic pleural effusion. There was no evidence of purulence or infection and did not appear to be an empyema. Meticulous hemostasis was ensured. The lungs were reinflated under direct visualization to ensure an adequate decortication. Approximately 60 mL of 1% lidocaine mixed with 0.25% Marcaine was used to perform intercostal nerve block. A 28-Tamazight chest tube was placed posteriorly. The lungs were reinflated. The 3 incisions were then closed in a multilayer fashion and covered with Dermabond. Of note, the patient's subcutaneous tissue was extremely anasarcic with lots of fluid leaking out from around the incisions. The patient was then reversed from general anesthesia, extubated, and taken to PACU in stable condition. All surgical counts were correct x2 at the end of the case. COMPLICATIONS:  There were no immediate complications identified during this case.     Dr. Anna Oliveros was present and scrubbed throughout the entire procedure. Andres Hernandez MD       RTF / Marielle Shirley  D: 06/21/2018 10:59     T: 06/21/2018 12:18  JOB #: [id number]

## 2018-06-21 NOTE — PERIOP NOTES
Patient: Delmis Enciso MRN: [id number]  SSN: [id number]   YOB: 1985  Age: 29 y.o. Sex: female     Patient is status post Procedure(s):  RIGHT VIDEO ASSITED THORASCOPY, DECORTICATION.     Surgeon(s) and Role:     * Sindy Stahl MD - Primary    Local/Dose/Irrigation: SEE MAR              Triple Lumen 06/17/18 Left (Active)   Central Line Being Utilized Yes 6/20/2018  8:12 PM   Criteria for Appropriate Use Limited/no vessel suitable for conventional peripheral access 6/20/2018  8:12 PM   Site Assessment Clean, dry, & intact 6/20/2018  8:12 PM   Infiltration Assessment 0 6/20/2018  8:12 PM   Affected Extremity/Extremities Color distal to insertion site pink (or appropriate for race) 6/20/2018  8:12 PM   Date of Last Dressing Change 06/20/18 6/20/2018  8:12 PM   Dressing Status Clean, dry, & intact 6/20/2018  8:12 PM   Dressing Type Bacteriocidal 6/20/2018  5:44 PM   Action Taken Open ports on tubing capped 6/20/2018  8:12 PM   Proximal Hub Color/Line Status Brown;Capped 6/21/2018  4:46 AM   Positive Blood Return (Medial Site) Yes 6/20/2018  8:12 PM   Medial Hub Color/Line Status Blue;Capped 6/20/2018  8:12 PM   Positive Blood Return (Lateral Site) Yes 6/20/2018  8:12 PM   Distal Hub Color/Line Status White;Capped 6/20/2018  8:12 PM   Positive Blood Return (Site #3) Yes 6/20/2018  8:12 PM   Alcohol Cap Used Yes 6/20/2018  8:12 PM              Drain 06/17/18 (Active)   Site Assessment Clean, dry, & intact 6/20/2018  8:12 PM   Dressing Status Clean, dry, & intact 6/20/2018  8:12 PM   Status Charged 6/20/2018  8:12 PM   Drainage Description Serosanguinous 6/20/2018  8:12 PM   Intake (ml) 10 ml 6/20/2018  2:25 PM   Output (ml) 45 ml 6/20/2018  2:25 PM      Airway - Endotracheal Tube 06/21/18 Oral (Active)                   Dressing/Packing:  Wound Chest Right-DRESSING TYPE: Topical skin adhesive/glue (06/21/18 0900)  Splint/Cast:  ]    Other:

## 2018-06-21 NOTE — BRIEF OP NOTE
BRIEF OPERATIVE NOTE    Date of Procedure: 6/21/2018   Preoperative Diagnosis: right loculated PARAPNEUMONIC EFFUSION   Postoperative Diagnosis: same  Procedure(s):  RIGHT VIDEO ASSITED THORASCOPY, full pulmonary DECORTICATION right lower lobe  Surgeon(s) and Role:     * Kiana Benítez MD - Primary         Surgical Assistant: none    Surgical Staff:  Circ-1: Lenora Bello RN  Scrub Tech-1: Gerri Slice  Surg Asst-1: Larraine Button  Event Time In   Incision Start 9467   Incision Close      Anesthesia: General   Estimated Blood Loss: 100ml  Specimens:   ID Type Source Tests Collected by Time Destination   1 : right pleural peel Fresh Other                  Kiana Benítez MD 6/21/2018 5395 Pathology   1 : right pleural fluid Body Fluid Pleural Fluid CULTURE, ANAEROBIC, CULTURE, BODY FLUID, GRAM STAIN, CULTURE &amp; SMEAR, AFB, CULTURE, FUNGUS Kiana Benítez MD 6/21/2018 4884 Microbiology      Findings: did not appear infected, appeared like a chronic effusion   Complications: none  Implants: * No implants in log *     Condition: stable    Disposition: to pacu

## 2018-06-21 NOTE — PERIOP NOTES
TRANSFER - OUT REPORT:    Verbal report given to Jaimee BOSCH on Angella Jimenez  being transferred to [phone] for routine post - op       Report consisted of patients Situation, Background, Assessment and   Recommendations(SBAR). Time Pre op antibiotic given:scheduled vanc and zosyn  Anesthesia Stop time: 9428   Franco Present on Transfer to floor:no  Order for Franco on Chart:n/a  Discharge Prescriptions with Chart:n/a    Information from the following report(s) SBAR, OR Summary, Intake/Output and MAR was reviewed with the receiving nurse. Opportunity for questions and clarification was provided. Is the patient on 02? YES       L/Min 3       Other via NC    Is the patient on a monitor? YES    Is the nurse transporting with the patient? YES    Surgical Waiting Area notified of patient's transfer from PACU? YES, daugther at bedside. The following personal items collected during your admission accompanied patient upon transfer:   Dental Appliance: Dental Appliances: None  Vision: Visual Aid: None  Hearing Aid:    Jewelry: Jewelry: Bracelet, With patient  Clothing: Clothing: Shorts, Footwear, Pants, At bedside  Other Valuables:  Other Valuables: Cell Phone, Purse, At bedside  Valuables sent to safe:

## 2018-06-21 NOTE — PROGRESS NOTES
Bedside and Verbal shift change report given to Shara Ferrera (oncoming nurse) by Angela Abreu (offgoing nurse). Report included the following information SBAR.

## 2018-06-21 NOTE — ANESTHESIA POSTPROCEDURE EVALUATION
Post-Anesthesia Evaluation and Assessment    Patient: Pradeep Thompson MRN: [id number]  SSN: [id number]    YOB: 1985  Age: 29 y.o. Sex: female       Cardiovascular Function/Vital Signs  Visit Vitals    BP (!) 177/118    Pulse 93    Temp 36.6 °C (97.8 °F) (Oral)    Resp 16    Ht 5' 5\" (1.651 m)    Wt 68.9 kg (151 lb 14.4 oz)    SpO2 96%    BMI 25.28 kg/m2       Patient is status post general anesthesia for Procedure(s):  RIGHT VIDEO ASSITED THORASCOPY, DECORTICATION. Nausea/Vomiting: None    Postoperative hydration reviewed and adequate. Pain:  Pain Scale 1: Numeric (0 - 10) (06/21/18 1225)  Pain Intensity 1: 5 (06/21/18 1225)   Managed    Neurological Status:   Neuro (WDL): Exceptions to WDL (06/21/18 1115)  Neuro  Neurologic State: Drowsy (06/21/18 1115)  LUE Motor Response: Purposeful;Weak (06/21/18 1115)  LLE Motor Response: Purposeful;Weak (06/21/18 1115)  RUE Motor Response: Purposeful;Weak (06/21/18 1115)  RLE Motor Response: Purposeful;Weak (06/21/18 1115)   At baseline    Mental Status and Level of Consciousness: Arousable    Pulmonary Status:   O2 Device: Room air (06/21/18 1245)   Adequate oxygenation and airway patent    Complications related to anesthesia: None    Post-anesthesia assessment completed.  No concerns    Signed By: Aury Perez MD     June 21, 2018

## 2018-06-21 NOTE — PROGRESS NOTES
NAME: Fareed Jacobson        :  1985        MRN:  [id number]        Assessment :    Plan:  - End-stage renal disease - TTS - [facility]     SLE    Anemia    PNA     --Hd today. Continue EPO. Addendum:  Seen on HD at 4:00 pm.  Tolerating well. BP up. Pulling fluid. Subjective:     Chief Complaint:  Seen in PACU. Groggy. \" I'm in pain. \"  No dyspnea. No cough. No N/V. No HA. Review of Systems:    Symptom Y/N Comments  Symptom Y/N Comments   Fever/Chills    Chest Pain     Poor Appetite    Edema     Cough    Abdominal Pain     Sputum    Joint Pain     SOB/IVY    Pruritis/Rash     Nausea/vomit    Tolerating PT/OT     Diarrhea    Tolerating Diet     Constipation    Other       Could not obtain due to:      Objective:     VITALS:   Last 24hrs VS reviewed since prior progress note.  Most recent are:  Visit Vitals    BP (!) 156/114    Pulse 91    Temp 97.8 °F (36.6 °C)    Resp 23    Ht 5' 5\" (1.651 m)    Wt 68.9 kg (151 lb 14.4 oz)    SpO2 100%    BMI 25.28 kg/m2       Intake/Output Summary (Last 24 hours) at 18 1406  Last data filed at 18 1255   Gross per 24 hour   Intake              310 ml   Output             1845 ml   Net            -1535 ml      Telemetry Reviewed:     PHYSICAL EXAM:  General: NAD  CTA  No edema      Lab Data Reviewed: (see below)    Medications Reviewed: (see below)    PMH/SH reviewed - no change compared to H&P  ________________________________________________________________________  Care Plan discussed with:  Patient     Family      RN     Care Manager                    Consultant:          Comments   >50% of visit spent in counseling and coordination of care       ________________________________________________________________________  Landeros Polka, MD     Procedures: see electronic medical records for all procedures/Xrays and details which  were not copied into this note but were reviewed prior to creation of Plan. LABS:  Recent Labs      06/19/18   0315   WBC  3.1*   HGB  8.3*   HCT  27.8*   PLT  163     Recent Labs      06/21/18   0301  06/20/18   0210  06/19/18   0315   NA  140  138  139   K  4.6  5.1  4.8   CL  104  101  101   CO2  30  32  30   BUN  24*  21*  33*   CREA  4.34*  4.54*  6.14*   GLU  100  95  98   CA  8.3*  8.3*  8.3*     No results for input(s): SGOT, GPT, AP, TBIL, TP, ALB, GLOB, GGT, AML, LPSE in the last 72 hours. No lab exists for component: AMYP, HLPSE  No results for input(s): INR, PTP, APTT in the last 72 hours. No lab exists for component: INREXT, INREXT   No results for input(s): FE, TIBC, PSAT, FERR in the last 72 hours. Lab Results   Component Value Date/Time    Folate 13.7 06/17/2018 03:00 PM      No results for input(s): PH, PCO2, PO2 in the last 72 hours. No results for input(s): CPK, CKMB in the last 72 hours.     No lab exists for component: TROPONINI  No components found for: [place]  Lab Results   Component Value Date/Time    Color YELLOW/STRAW 08/12/2016 09:06 PM    Appearance CLEAR 08/12/2016 09:06 PM    Specific gravity 1.017 08/12/2016 09:06 PM    Specific gravity 1.010 07/11/2016 12:44 PM    pH (UA) 7.0 08/12/2016 09:06 PM    Protein 300 (A) 08/12/2016 09:06 PM    Glucose NEGATIVE  08/12/2016 09:06 PM    Ketone TRACE (A) 08/12/2016 09:06 PM    Bilirubin NEGATIVE  07/11/2016 12:44 PM    Urobilinogen 1.0 08/12/2016 09:06 PM    Nitrites NEGATIVE  08/12/2016 09:06 PM    Leukocyte Esterase NEGATIVE  08/12/2016 09:06 PM    Epithelial cells MODERATE (A) 08/12/2016 09:06 PM    Bacteria 1+ (A) 08/12/2016 09:06 PM    WBC 0-4 08/12/2016 09:06 PM    RBC 0-5 08/12/2016 09:06 PM       MEDICATIONS:  Current Facility-Administered Medications   Medication Dose Route Frequency    sugammadex (BRIDION) 100 mg/mL injection 138 mg  2 mg/kg IntraVENous NOW    Vancomycin random- 6/20; please draw at the beginning of dialysis Other DIALYSIS ONCE    epoetin pia (EPOGEN;PROCRIT) injection 8,000 Units  8,000 Units SubCUTAneous DIALYSIS TUE, THU & SAT    vancomycin (VANCOCIN) 500 mg in 0.9% sodium chloride (MBP/ADV) 100 mL  500 mg IntraVENous DIALYSIS TUE, THU & SAT    acetaminophen (TYLENOL) tablet 650 mg  650 mg Oral Q4H PRN    oxyCODONE-acetaminophen (PERCOCET) 5-325 mg per tablet 1 Tab  1 Tab Oral Q4H PRN    albuterol-ipratropium (DUO-NEB) 2.5 MG-0.5 MG/3 ML  3 mL Nebulization Q4H PRN    piperacillin-tazobactam (ZOSYN) 3.375 g in 0.9% sodium chloride (MBP/ADV) 100 mL  3.375 g IntraVENous Q12H    Vancomycin Pharmacy Dosing   Other Rx Dosing/Monitoring    allopurinol (ZYLOPRIM) tablet 100 mg  100 mg Oral DAILY AFTER BREAKFAST    amitriptyline (ELAVIL) tablet 25 mg  25 mg Oral QHS PRN    amLODIPine (NORVASC) tablet 10 mg  10 mg Oral DAILY    cyanocobalamin (VITAMIN B12) tablet 2,500 mcg  2,500 mcg Oral DAILY    gemfibrozil (LOPID) tablet 300 mg  300 mg Oral DAILY    hydroxychloroquine (PLAQUENIL) tablet 200 mg  200 mg Oral BID    pantoprazole (PROTONIX) tablet 40 mg  40 mg Oral ACB    polyethylene glycol (MIRALAX) packet 17 g  17 g Oral DAILY    predniSONE (DELTASONE) tablet 5 mg  5 mg Oral DAILY    senna (SENOKOT) tablet 8.6 mg  1 Tab Oral DAILY PRN    sodium chloride (NS) flush 5-10 mL  5-10 mL IntraVENous Q8H    sodium chloride (NS) flush 5-10 mL  5-10 mL IntraVENous PRN    ondansetron (ZOFRAN) injection 4 mg  4 mg IntraVENous Q4H PRN    fentaNYL citrate (PF) injection 25 mcg  25 mcg IntraVENous Q4H PRN    arformoterol (BROVANA) neb solution 15 mcg  15 mcg Nebulization BID RT    And    budesonide (PULMICORT) 500 mcg/2 ml nebulizer suspension  500 mcg Nebulization BID RT    hydrALAZINE (APRESOLINE) 20 mg/mL injection 10 mg  10 mg IntraVENous Q6H PRN    carvedilol (COREG) tablet 25 mg  25 mg Oral BID WITH MEALS    losartan (COZAAR) tablet 50 mg  50 mg Oral DAILY    cloNIDine HCl (CATAPRES) tablet 0.1 mg  0.1 mg Oral BID

## 2018-06-21 NOTE — PROGRESS NOTES
Hospitalist Progress Note  Bryant Varela MD  Answering service: [phone] OR [phone] from in house phone        Date of Service:  2018  NAME:  Sabrina Obregon  :  1985  MRN:  [id number]      Admission Summary: This is a 29-year-old woman with a past medical history significant for end-stage renal disease on hemodialysis, lupus, asthma, thromboembolism, dyslipidemia, was in her usual state of health until the day of presentation at the emergency room when the patient developed shortness of breath. The shortness of breath is progressive, it is with little or no activity. Patient had routine dialysis done. After the dialysis, that is when the patient developed the shortness of breath. Patient used to be on peritoneal dialysis, but was converted to hemodialysis because of recurrent infection. The patient was last admitted to this hospital from 2018 to 2018. She was admitted and treated for sepsis, attributed to multiple abdominal abscesses. Patient underwent drainage of the abdominal abscess. Patient also underwent drainage of a right pleural effusion. She was discharged home to complete a course of Augmentin. She stated that she has been having shortness of breath on and off since she was discharged from hospital.  A day before coming to the emergency room here, she was seen at another emergency room. Patient was evaluated, but was not admitted. When the patient arrived at the emergency room, she was found to have elevated lactic acid level, low grade temperature, tachycardia. Code sepsis was called. The chest x-ray shows evidence of pneumonia  Similar presentation last admission as well    Interval history / Subjective:    s/p Right video-assisted thoracoscopy and Full pulmonary decortication of the right lower lobe.   Pt was having HD when seen , only c/o soreness at the surgical site      Assessment & Plan: Health care associated Pneumonia with partially loculated pleural effusion (?recurrent)  -CXR 6/17 Unchanged mild right pleural effusion with patchy opacification right base  -Vanc/Zosyn  -Follow cultures  -CT chest/abd 6/17 The pleural fluid on the right extends more superiorly in the right hemithorax and there is a more focal collection laterally measuring 5.4 cm may be partially loculated.  -Last admission patient had US guided removal with chest tube placed  -Consult thoracic surgery- s/p VATS on 6/21  - abx MGMT per ID    Abdominal cavity abscess (last admission)  -CT abd 6/17 Pigtail catheter overlies collection in the pelvis anteriorly which has diminished considerably in size since 5/16/2018 and there is a small amount of residual fluid noted    Suspected sepsis  -as above  -blood culture 6/16 alpha Strep in  1/2 bottles  -Repeat blood culture 6/18  Neg so far     ESRD  -on HD (TTS)  -Appreciate Nephrology    Anemia  -likely of chronic disease  -on EPO     Lupus  -Outpatient being followed by Dr Ilan Fernandez  -hold imuran     Chronic DVT  -on Eliquis - on hold for surgery   - resume tomorrow if OK with surgery    Elevated troponin  -Likely from ESRD  - Cardiology consulted  -Echo EF 60-65% with no RWMA    Dyslipidemia  -continue home meds    Diet  Renal diet    Code status: Full  DVT prophylaxis: Eliquis  PTA: home    Plan: Continue antibiotics, follow cultures,    Care Plan discussed with: Patient/Family  Disposition: TBD   For VATS tomorrow      Hospital Problems  Date Reviewed: 6/21/2018          Codes Class Noted POA    Troponin level elevated ICD-10-CM: R74.8  ICD-9-CM: 790.6  6/17/2018 Unknown        * (Principal)Sepsis ([facility] [address] ICD-10-CM: A41.9  ICD-9-CM: 038.9, 995.91  4/29/2018 Yes                Review of Systems:   A comprehensive review of systems was negative except for that written in the HPI. Vital Signs:    Last 24hrs VS reviewed since prior progress note.  Most recent are:  Visit Vitals  BP (!) 177/118    Pulse 93    Temp 97.8 °F (36.6 °C) (Oral)    Resp 16    Ht 5' 5\" (1.651 m)    Wt 68.9 kg (151 lb 14.4 oz)    SpO2 96%    BMI 25.28 kg/m2         Intake/Output Summary (Last 24 hours) at 06/21/18 1541  Last data filed at 06/21/18 1255   Gross per 24 hour   Intake              300 ml   Output             1800 ml   Net            -1500 ml        Physical Examination:             Constitutional:  No acute distress, cooperative, pleasant    ENT:  Oral mucous moist, oropharynx benign. Neck supple,    Resp:  Bilateral diminished breath sounds. No accessory muscle use rt sided CT tube    CV:  Regular rhythm, normal rate, no murmurs, gallops, rubs    GI:  Soft, non distended, non tender. normoactive bowel sounds, no hepatosplenomegaly, drain in place    Musculoskeletal:  No edema, warm, 2+ pulses throughout,Right AV fistula in place    Neurologic:  Moves all extremities. AAOx3, CN II-XII reviewed     Skin:  Good turgor, no rashes or ulcers       Data Review:    Review and/or order of clinical lab test      Labs:     Recent Labs      06/19/18   0315   WBC  3.1*   HGB  8.3*   HCT  27.8*   PLT  163     Recent Labs      06/21/18   0301  06/20/18   0210  06/19/18   0315   NA  140  138  139   K  4.6  5.1  4.8   CL  104  101  101   CO2  30  32  30   BUN  24*  21*  33*   CREA  4.34*  4.54*  6.14*   GLU  100  95  98   CA  8.3*  8.3*  8.3*     No results for input(s): SGOT, GPT, ALT, AP, TBIL, TBILI, TP, ALB, GLOB, GGT, AML, LPSE in the last 72 hours. No lab exists for component: AMYP, HLPSE  No results for input(s): INR, PTP, APTT in the last 72 hours. No lab exists for component: INREXT, INREXT   No results for input(s): FE, TIBC, PSAT, FERR in the last 72 hours. Lab Results   Component Value Date/Time    Folate 13.7 06/17/2018 03:00 PM      No results for input(s): PH, PCO2, PO2 in the last 72 hours. No results for input(s): CPK, CKNDX, TROIQ in the last 72 hours.     No lab exists for component: CPKMB  Lab Results   Component Value Date/Time    Cholesterol, total 117 09/25/2015 02:02 PM    HDL Cholesterol 31 (L) 09/25/2015 02:02 PM    LDL, calculated 57 09/25/2015 02:02 PM    Triglyceride 146 09/25/2015 02:02 PM    CHOL/HDL Ratio 7.7 (H) 05/31/2009 10:30 AM     Lab Results   Component Value Date/Time    Glucose (POC) 123 (H) 04/20/2018 05:33 PM    Glucose (POC) 95 04/20/2018 11:19 AM    Glucose (POC) 100 04/20/2018 07:18 AM    Glucose (POC) 119 (H) 04/19/2018 09:11 PM    Glucose (POC) 86 04/19/2018 04:30 PM     Lab Results   Component Value Date/Time    Color YELLOW/STRAW 08/12/2016 09:06 PM    Appearance CLEAR 08/12/2016 09:06 PM    Specific gravity 1.017 08/12/2016 09:06 PM    Specific gravity 1.010 07/11/2016 12:44 PM    pH (UA) 7.0 08/12/2016 09:06 PM    Protein 300 (A) 08/12/2016 09:06 PM    Glucose NEGATIVE  08/12/2016 09:06 PM    Ketone TRACE (A) 08/12/2016 09:06 PM    Bilirubin NEGATIVE  07/11/2016 12:44 PM    Urobilinogen 1.0 08/12/2016 09:06 PM    Nitrites NEGATIVE  08/12/2016 09:06 PM    Leukocyte Esterase NEGATIVE  08/12/2016 09:06 PM    Epithelial cells MODERATE (A) 08/12/2016 09:06 PM    Bacteria 1+ (A) 08/12/2016 09:06 PM    WBC 0-4 08/12/2016 09:06 PM    RBC 0-5 08/12/2016 09:06 PM         Medications Reviewed:     Current Facility-Administered Medications   Medication Dose Route Frequency    sugammadex (BRIDION) 100 mg/mL injection 138 mg  2 mg/kg IntraVENous NOW    senna (SENOKOT) tablet 8.6 mg  1 Tab Oral QHS    sodium chloride (NS) flush 5-10 mL  5-10 mL IntraVENous Q8H    sodium chloride (NS) flush 5-10 mL  5-10 mL IntraVENous PRN    oxyCODONE-acetaminophen (PERCOCET) 5-325 mg per tablet 2 Tab  2 Tab Oral Q4H PRN    fentaNYL citrate (PF) injection 50 mcg  50 mcg IntraVENous Q2H PRN    naloxone (NARCAN) injection 0.4 mg  0.4 mg IntraVENous PRN    docusate sodium (COLACE) capsule 100 mg  100 mg Oral BID    ketorolac (TORADOL) injection 15 mg  15 mg IntraVENous Q6H    Vancomycin random- 6/20; please draw at the beginning of dialysis   Other DIALYSIS ONCE    epoetin pia (EPOGEN;PROCRIT) injection 8,000 Units  8,000 Units SubCUTAneous DIALYSIS TUE, THU & SAT    vancomycin (VANCOCIN) 500 mg in 0.9% sodium chloride (MBP/ADV) 100 mL  500 mg IntraVENous DIALYSIS TUE, THU & SAT    albuterol-ipratropium (DUO-NEB) 2.5 MG-0.5 MG/3 ML  3 mL Nebulization Q4H PRN    piperacillin-tazobactam (ZOSYN) 3.375 g in 0.9% sodium chloride (MBP/ADV) 100 mL  3.375 g IntraVENous Q12H    Vancomycin Pharmacy Dosing   Other Rx Dosing/Monitoring    allopurinol (ZYLOPRIM) tablet 100 mg  100 mg Oral DAILY AFTER BREAKFAST    amitriptyline (ELAVIL) tablet 25 mg  25 mg Oral QHS PRN    amLODIPine (NORVASC) tablet 10 mg  10 mg Oral DAILY    cyanocobalamin (VITAMIN B12) tablet 2,500 mcg  2,500 mcg Oral DAILY    gemfibrozil (LOPID) tablet 300 mg  300 mg Oral DAILY    hydroxychloroquine (PLAQUENIL) tablet 200 mg  200 mg Oral BID    pantoprazole (PROTONIX) tablet 40 mg  40 mg Oral ACB    predniSONE (DELTASONE) tablet 5 mg  5 mg Oral DAILY    senna (SENOKOT) tablet 8.6 mg  1 Tab Oral DAILY PRN    ondansetron (ZOFRAN) injection 4 mg  4 mg IntraVENous Q4H PRN    arformoterol (BROVANA) neb solution 15 mcg  15 mcg Nebulization BID RT    And    budesonide (PULMICORT) 500 mcg/2 ml nebulizer suspension  500 mcg Nebulization BID RT    hydrALAZINE (APRESOLINE) 20 mg/mL injection 10 mg  10 mg IntraVENous Q6H PRN    carvedilol (COREG) tablet 25 mg  25 mg Oral BID WITH MEALS    losartan (COZAAR) tablet 50 mg  50 mg Oral DAILY    cloNIDine HCl (CATAPRES) tablet 0.1 mg  0.1 mg Oral BID     ______________________________________________________________________  EXPECTED LENGTH OF STAY: 4d 21h  ACTUAL LENGTH OF STAY:          4                 Suri Perdue MD

## 2018-06-21 NOTE — PROGRESS NOTES
Bedside shift change report given to Jesus Cagle RN (oncoming nurse) by Rebecca Arboleda RN (offgoing nurse). Report included the following information SBAR.

## 2018-06-21 NOTE — ANESTHESIA PREPROCEDURE EVALUATION
Anesthetic History   No history of anesthetic complications            Review of Systems / Medical History  Patient summary reviewed, nursing notes reviewed and pertinent labs reviewed    Pulmonary          Pneumonia and shortness of breath         Neuro/Psych   Within defined limits           Cardiovascular    Hypertension              Exercise tolerance: <4 METS  Comments: Recent echo:  Nl Lv fx, mild MR, mild red in RV fx.   GI/Hepatic/Renal     GERD    Renal disease: dialysis and ESRD       Endo/Other        Arthritis and anemia     Other Findings   Comments: SLE           Physical Exam    Airway  Mallampati: III  TM Distance: > 6 cm  Neck ROM: normal range of motion   Mouth opening: Diminished (comment)     Cardiovascular  Regular rate and rhythm,  S1 and S2 normal,  no murmur, click, rub, or gallop             Dental  No notable dental hx       Pulmonary    Rhonchi:right  Decreased breath sounds: right           Abdominal  GI exam deferred       Other Findings            Anesthetic Plan    ASA: 3  Anesthesia type: general          Induction: Intravenous  Anesthetic plan and risks discussed with: Patient      Disc low risk of intub post-op due to pre-existing pulm dz. All questions answered. Pt consents to [facility].

## 2018-06-21 NOTE — DIALYSIS
[facility]  [phone]    Vitals   Pre   Post   Assessment   Pre   Post     Temp  Temp: 97.8 °F (36.6 °C) (06/21/18 1500) 98.0 LOC  Alert and oriented x3 Alert and oriented x3      HR   Pulse (Heart Rate): 91 (06/21/18 1500) 97 Lungs   expiatory wheeze    expiatory wheeze      B/P   BP: (!) 164/117 (06/21/18 1500) 188/120 Cardiac   Reg rate and rythm    Reg rate and rythm      Resp   Resp Rate: 16 (06/21/18 1500) 20 Skin   Warm and dry    Warm and dry      Pain level  Pain Intensity 1: 5 (06/21/18 1225) 3 Edema  general general   Orders:    Duration:   Start:    1500 End:    9202 Total:   3.5   Dialyzer:   Dialyzer/Set Up Inspection: Revaclear (06/21/18 1500)   K Bath:   Dialysate K (mEq/L): 2 (06/21/18 1500)   Ca Bath:   Dialysate CA (mEq/L): 2.5 (06/21/18 1500)   Na/Bicarb:   Dialysate NA (mEq/L): 140 (06/21/18 1500)   Target Fluid Removal:   Goal/Amount of Fluid to Remove (mL): 1500 mL (06/20/18 1715)   [facility]     [facility]   Labs     Obtained/Reviewed   Critical Results Called   Date when labs were drawn-  Hgb-    HGB   Date Value Ref Range Status   06/19/2018 8.3 (L) 11.5 - 16.0 g/dL Final     K-    Potassium   Date Value Ref Range Status   06/21/2018 4.6 3.5 - 5.1 mmol/L Final     Ca-   Calcium   Date Value Ref Range Status   06/21/2018 8.3 (L) 8.5 - 10.1 MG/DL Final     Bun-   BUN   Date Value Ref Range Status   06/21/2018 24 (H) 6 - 20 MG/DL Final     Creat-   Creatinine   Date Value Ref Range Status   06/21/2018 4.34 (H) 0.55 - 1.02 MG/DL Final        Medications/ Blood Products Given     Name   Dose   Route and Time                     Blood Volume Processed (BVP):    78 Net Fluid   Removed:  2000mL   Comments   Time Out Done: 5930  Primary Nurse Rpt Pre: Polly Son  Primary Nurse Rpt Post:axel Leach  Pt Education: procedure, medications  Care Plan:follow the orders of the nephrologist  Tx Summary: assess avg for positive thrill and bruit, clean with alcohol pad, cannulate wit 15GA needles X2, secure. tx complete, return all possible blood to pt. Remove needles x2 and apply pressure till hemostasis. Cover sites with 2x2 guaze and tape. Admiting Diagnosis: SEpsis, elevated troponin levels  Pt's previous clinic-[facility]  Consent signed - Informed Consent Verified: Yes (06/21/18 1500)  Crystalita Consent - yes  Hepatitis Status- negative  Machine #- Machine Number: [id number] (06/21/18 1500)  Telemetry status-bedside  Pre-dialysis wt. - Pre-Dialysis Weight: 68.9 kg (151 lb 14.4 oz) (06/21/18 1500)     98

## 2018-06-22 ENCOUNTER — APPOINTMENT (OUTPATIENT)
Dept: GENERAL RADIOLOGY | Age: 33
DRG: 853 | End: 2018-06-22
Attending: NURSE PRACTITIONER
Payer: MEDICARE

## 2018-06-22 LAB
ANION GAP SERPL CALC-SCNC: 6 MMOL/L (ref 5–15)
BACTERIA SPEC CULT: ABNORMAL
BUN SERPL-MCNC: 18 MG/DL (ref 6–20)
BUN/CREAT SERPL: 5 (ref 12–20)
CALCIUM SERPL-MCNC: 8.3 MG/DL (ref 8.5–10.1)
CHLORIDE SERPL-SCNC: 102 MMOL/L (ref 97–108)
CO2 SERPL-SCNC: 31 MMOL/L (ref 21–32)
CREAT SERPL-MCNC: 3.31 MG/DL (ref 0.55–1.02)
GLUCOSE SERPL-MCNC: 91 MG/DL (ref 65–100)
POTASSIUM SERPL-SCNC: 3.9 MMOL/L (ref 3.5–5.1)
SERVICE CMNT-IMP: ABNORMAL
SODIUM SERPL-SCNC: 139 MMOL/L (ref 136–145)

## 2018-06-22 PROCEDURE — 74011000258 HC RX REV CODE- 258: Performed by: INTERNAL MEDICINE

## 2018-06-22 PROCEDURE — 74011250636 HC RX REV CODE- 250/636: Performed by: INTERNAL MEDICINE

## 2018-06-22 PROCEDURE — 74011250636 HC RX REV CODE- 250/636: Performed by: NURSE PRACTITIONER

## 2018-06-22 PROCEDURE — 71045 X-RAY EXAM CHEST 1 VIEW: CPT

## 2018-06-22 PROCEDURE — 80048 BASIC METABOLIC PNL TOTAL CA: CPT | Performed by: INTERNAL MEDICINE

## 2018-06-22 PROCEDURE — 74011636637 HC RX REV CODE- 636/637: Performed by: INTERNAL MEDICINE

## 2018-06-22 PROCEDURE — 74011250637 HC RX REV CODE- 250/637: Performed by: INTERNAL MEDICINE

## 2018-06-22 PROCEDURE — 74011250636 HC RX REV CODE- 250/636: Performed by: THORACIC SURGERY (CARDIOTHORACIC VASCULAR SURGERY)

## 2018-06-22 PROCEDURE — 74011000250 HC RX REV CODE- 250: Performed by: INTERNAL MEDICINE

## 2018-06-22 PROCEDURE — 94640 AIRWAY INHALATION TREATMENT: CPT

## 2018-06-22 PROCEDURE — 36415 COLL VENOUS BLD VENIPUNCTURE: CPT | Performed by: INTERNAL MEDICINE

## 2018-06-22 PROCEDURE — 74011250637 HC RX REV CODE- 250/637: Performed by: SPECIALIST

## 2018-06-22 PROCEDURE — 65660000000 HC RM CCU STEPDOWN

## 2018-06-22 PROCEDURE — 74011250637 HC RX REV CODE- 250/637: Performed by: THORACIC SURGERY (CARDIOTHORACIC VASCULAR SURGERY)

## 2018-06-22 RX ORDER — SODIUM CHLORIDE 0.9 % (FLUSH) 0.9 %
10-30 SYRINGE (ML) INJECTION AS NEEDED
Status: DISCONTINUED | OUTPATIENT
Start: 2018-06-22 | End: 2018-06-28 | Stop reason: HOSPADM

## 2018-06-22 RX ORDER — SODIUM CHLORIDE 0.9 % (FLUSH) 0.9 %
20 SYRINGE (ML) INJECTION EVERY 24 HOURS
Status: DISCONTINUED | OUTPATIENT
Start: 2018-06-22 | End: 2018-06-28 | Stop reason: HOSPADM

## 2018-06-22 RX ORDER — SODIUM CHLORIDE 0.9 % (FLUSH) 0.9 %
10-40 SYRINGE (ML) INJECTION EVERY 8 HOURS
Status: DISCONTINUED | OUTPATIENT
Start: 2018-06-22 | End: 2018-06-28 | Stop reason: HOSPADM

## 2018-06-22 RX ORDER — BACITRACIN 500 UNIT/G
1 PACKET (EA) TOPICAL AS NEEDED
Status: DISCONTINUED | OUTPATIENT
Start: 2018-06-22 | End: 2018-06-28 | Stop reason: HOSPADM

## 2018-06-22 RX ORDER — SODIUM CHLORIDE 0.9 % (FLUSH) 0.9 %
10 SYRINGE (ML) INJECTION AS NEEDED
Status: DISCONTINUED | OUTPATIENT
Start: 2018-06-22 | End: 2018-06-28 | Stop reason: HOSPADM

## 2018-06-22 RX ORDER — SODIUM CHLORIDE 0.9 % (FLUSH) 0.9 %
10 SYRINGE (ML) INJECTION EVERY 24 HOURS
Status: DISCONTINUED | OUTPATIENT
Start: 2018-06-22 | End: 2018-06-28 | Stop reason: HOSPADM

## 2018-06-22 RX ADMIN — PIPERACILLIN SODIUM,TAZOBACTAM SODIUM 3.38 G: 3; .375 INJECTION, POWDER, FOR SOLUTION INTRAVENOUS at 11:04

## 2018-06-22 RX ADMIN — FENTANYL CITRATE 50 MCG: 50 INJECTION, SOLUTION INTRAMUSCULAR; INTRAVENOUS at 07:15

## 2018-06-22 RX ADMIN — PANTOPRAZOLE SODIUM 40 MG: 40 TABLET, DELAYED RELEASE ORAL at 07:16

## 2018-06-22 RX ADMIN — FENTANYL CITRATE 50 MCG: 50 INJECTION, SOLUTION INTRAMUSCULAR; INTRAVENOUS at 18:25

## 2018-06-22 RX ADMIN — Medication 10 ML: at 07:17

## 2018-06-22 RX ADMIN — Medication 10 ML: at 01:04

## 2018-06-22 RX ADMIN — ARFORMOTEROL TARTRATE 15 MCG: 15 SOLUTION RESPIRATORY (INHALATION) at 08:23

## 2018-06-22 RX ADMIN — HYDROXYCHLOROQUINE SULFATE 200 MG: 200 TABLET, FILM COATED ORAL at 17:07

## 2018-06-22 RX ADMIN — HYDROXYCHLOROQUINE SULFATE 200 MG: 200 TABLET, FILM COATED ORAL at 09:29

## 2018-06-22 RX ADMIN — ALLOPURINOL 100 MG: 100 TABLET ORAL at 09:29

## 2018-06-22 RX ADMIN — Medication 10 ML: at 22:39

## 2018-06-22 RX ADMIN — AMLODIPINE BESYLATE 10 MG: 5 TABLET ORAL at 09:29

## 2018-06-22 RX ADMIN — LOSARTAN POTASSIUM 50 MG: 50 TABLET ORAL at 09:29

## 2018-06-22 RX ADMIN — BUDESONIDE 500 MCG: 0.5 INHALANT RESPIRATORY (INHALATION) at 08:23

## 2018-06-22 RX ADMIN — CLONIDINE HYDROCHLORIDE 0.1 MG: 0.1 TABLET ORAL at 09:29

## 2018-06-22 RX ADMIN — FENTANYL CITRATE 50 MCG: 50 INJECTION, SOLUTION INTRAMUSCULAR; INTRAVENOUS at 20:35

## 2018-06-22 RX ADMIN — PIPERACILLIN SODIUM,TAZOBACTAM SODIUM 3.38 G: 3; .375 INJECTION, POWDER, FOR SOLUTION INTRAVENOUS at 22:26

## 2018-06-22 RX ADMIN — KETOROLAC TROMETHAMINE 15 MG: 30 INJECTION, SOLUTION INTRAMUSCULAR at 01:01

## 2018-06-22 RX ADMIN — OXYCODONE HYDROCHLORIDE AND ACETAMINOPHEN 2 TABLET: 5; 325 TABLET ORAL at 03:33

## 2018-06-22 RX ADMIN — CARVEDILOL 25 MG: 12.5 TABLET, FILM COATED ORAL at 07:16

## 2018-06-22 RX ADMIN — GEMFIBROZIL 300 MG: 600 TABLET ORAL at 09:28

## 2018-06-22 RX ADMIN — CARVEDILOL 25 MG: 12.5 TABLET, FILM COATED ORAL at 17:07

## 2018-06-22 RX ADMIN — Medication 10 ML: at 14:00

## 2018-06-22 RX ADMIN — DOCUSATE SODIUM 100 MG: 100 CAPSULE, LIQUID FILLED ORAL at 09:29

## 2018-06-22 RX ADMIN — FENTANYL CITRATE 50 MCG: 50 INJECTION, SOLUTION INTRAMUSCULAR; INTRAVENOUS at 10:57

## 2018-06-22 RX ADMIN — Medication 20 ML: at 07:19

## 2018-06-22 RX ADMIN — AMITRIPTYLINE HYDROCHLORIDE 25 MG: 50 TABLET, FILM COATED ORAL at 22:38

## 2018-06-22 RX ADMIN — CLONIDINE HYDROCHLORIDE 0.1 MG: 0.1 TABLET ORAL at 17:07

## 2018-06-22 RX ADMIN — SENNOSIDES 8.6 MG: 8.6 TABLET, FILM COATED ORAL at 22:26

## 2018-06-22 RX ADMIN — KETOROLAC TROMETHAMINE 15 MG: 30 INJECTION, SOLUTION INTRAMUSCULAR at 13:52

## 2018-06-22 RX ADMIN — FENTANYL CITRATE 50 MCG: 50 INJECTION, SOLUTION INTRAMUSCULAR; INTRAVENOUS at 15:15

## 2018-06-22 RX ADMIN — FENTANYL CITRATE 50 MCG: 50 INJECTION, SOLUTION INTRAMUSCULAR; INTRAVENOUS at 01:07

## 2018-06-22 RX ADMIN — Medication 10 ML: at 07:19

## 2018-06-22 RX ADMIN — OXYCODONE HYDROCHLORIDE AND ACETAMINOPHEN 2 TABLET: 5; 325 TABLET ORAL at 13:48

## 2018-06-22 RX ADMIN — Medication 2500 MCG: at 10:58

## 2018-06-22 RX ADMIN — FENTANYL CITRATE 50 MCG: 50 INJECTION, SOLUTION INTRAMUSCULAR; INTRAVENOUS at 22:38

## 2018-06-22 RX ADMIN — Medication 10 ML: at 13:53

## 2018-06-22 RX ADMIN — BUDESONIDE 500 MCG: 0.5 INHALANT RESPIRATORY (INHALATION) at 20:32

## 2018-06-22 RX ADMIN — PREDNISONE 5 MG: 5 TABLET ORAL at 09:36

## 2018-06-22 RX ADMIN — KETOROLAC TROMETHAMINE 15 MG: 30 INJECTION, SOLUTION INTRAMUSCULAR at 07:16

## 2018-06-22 RX ADMIN — DOCUSATE SODIUM 100 MG: 100 CAPSULE, LIQUID FILLED ORAL at 17:07

## 2018-06-22 RX ADMIN — FENTANYL CITRATE 50 MCG: 50 INJECTION, SOLUTION INTRAMUSCULAR; INTRAVENOUS at 04:28

## 2018-06-22 NOTE — DISCHARGE INSTRUCTIONS
*DISCHARGE INSTRUCTIONS AFTER LUNG [address] [facility]  [address]  [phone]    Leave the chest tube dressing in place for 48 hours, then you can remove it and shower. SURGICAL INCISION:    You will have two or three small incisions. These incisions are sealed with sutures that dissolve. The lower incision is from the chest tube. This lower incision will seal itself in 5 to 7 days. Until then you may have drainage from that incision. The drainage will be thin yellowish or red in color and may drain for 5 to 7 days. This is normal and expected. INCISION CARE:    Wash incisions daily with soap. Pat dry. Showers only, no tub baths. If you have drainage, you can place a bandage over the area, otherwise keep open to air. You may experience drainage of clear-strawberry colored fluid from the chest tube site. This is to be expected and will stop in 24-48 hours. PAIN:    What you will experience:     Tenderness and soreness around incisions   Burning and/or numbness   Soreness around front/back of chest    For relief of discomfort:     Take the pain medicine you were given at discharge   Aleve one or two tablets every 12 hrs (with food) along with pain medicine (this can be purchased over the counter at your local pharmacy/drug store). Advil may be substituted. Start this after you finish taking Toradol if prescribed.  Heating pad 10 minutes at a time to the upper incision area as often as you wish.  For the Ladies: Spandex or elastic sports bra will give you the support you need without pressure on the incision. Most will find this to be a great comfort. COUGHING:    Coughing is helpful after lung surgery. Place a pillow over your incision and apply pressure when coughing to reduce pain. ACTIVITY:    DO: 1. Walk daily outside if weather permits, at a mall if not.  Increase your distance each day. Exercise has many benefits. It promotes healing, expands your lungs,                helps you cough, relaxes your body, tones muscles, lowers blood pressure and               improves your appetite. After you exercise expect to feel tired and probably short                of breath. Plan to take a nap 1-2 times a day to regain your strength . 2. Climb stairs           3. Ride in a car           4. Perform breathing exercises (incentive spirometer)    DO NOT:               1. Lift heavy objects (8-10 pounds)  2. Drive a car/truck until after your post-op visit  3. Do heavy yard or housework     APPETITE:    It is usual to have a decreased appetite after surgery. Exercise is the best stimulant. You may expect to slowly improve over 4-10 days. CALL THE OFFICE IF YOU DEVELOP:     Increasing pain that is not controlled by the pain medicine.  Increasing redness or drainage around the incision.  Unusual or increasing shortness of breath   Fever greater than 101 degrees   Change in the color of your sputum to yellow or green, especially if you also have increasing shortness of breath and a fever greater than 101. YOUR MEDICATIONS:  As instructed        FOLLOW-UP APPOINTMENT:  AFTER DISCHARGE CALL THE OFFICE TO SCHEDULE YOUR VISIT TO SEE US IN 2 WEEKS. Please arrive 45 minutes prior to you appointment time in order to have a chest X-Ray. Check in at [address] [facility] on the ground floor of the [facility]. After your X-ray come to the [address] [place] [address] for your appointment.       Physician Signature     Discharge Instructions       PATIENT ID: Fraeed Jacobson  MRN: [id number]   YOB: 1985    DATE OF ADMISSION: 6/16/2018  8:25 PM    DATE OF DISCHARGE: 6/28/2018    PRIMARY CARE PROVIDER: Ryne Guerrero NP     ATTENDING PHYSICIAN: Davie Fleming MD  DISCHARGING PROVIDER: Davie Fleming MD    To contact this individual call [phone] and ask the  to page.   If unavailable ask to be transferred the Adult Hospitalist Department. DISCHARGE DIAGNOSES and ADDITIONAL CARE RECOMMENDATIONS:     Health care associated pneumonia with partially loculated pleural effusion,recurrent. You underwent video assisted thoracostomy and drainage of the fluid. You have completed intravenous antibiotics course in the hospital.Infectious disease and Thoracic surgery team were involved and all agreed you can go home. Watch for the following symptoms: shortness of breath,cough,fever,chest pain and if any of these symptoms occur,go to your doctor or call 911.     Abdominal cavity abscess (last admission)  -The cat scan on 6/17 showed the pelvic fluid has diminished significantly. We consulted the surgeons and recommend removal of the pigtail catheter,thus the catheter is taken out on 6/28. Please follow up with Dr Donnie Hui for check up.        End stage renal disease,on hemodialysis. -Go to your dialysis as before Tuesday,Thursday and Saturdays.     Anemia,you have received 3 units of blood. Ask the dialysis center to check your hemoglobin when you go there Saturday     Lupus: continue your medications which are unchanged. Follow up with your Rheumatologist,Dr Sincere Austin.     Chronic DVT: continue the prophylactic Eliquis     Hypertension,your blood pressure was high and we have made changes to your blood pressure medications   -See medication and dosage from the after visit summary.     CONSULTATIONS: IP CONSULT TO NEPHROLOGY  IP CONSULT TO THORACIC SURGERY  IP CONSULT TO INFECTIOUS DISEASES  IP CONSULT TO INFECTIOUS DISEASES  IP CONSULT TO INTERVENTIONAL RADIOLOGY  IP CONSULT TO CARDIOLOGY  IP CONSULT TO GENERAL SURGERY  IP CONSULT TO GENERAL SURGERY    PROCEDURES/SURGERIES: Procedure(s):  RIGHT VIDEO ASSITED THORASCOPY, DECORTICATION    PENDING TEST RESULTS:   At the time of discharge the following test results are still pending: none    FOLLOW UP APPOINTMENTS:   Follow-up Information Follow up With Details Comments Mikaela Sharma MD On 6/18/2018 at [facility] for cardiology followup/ Blood pressure evaluation. Please arrive at 1:40PM [address]  [address] [facility]      Makenzie Tabares MD On 12/21/2018 Echocardiogram at 1PM, followed by appointment with Dr. Kobi Puckett. Please arrive at 12:40PM [address] [facility]      [address]  [address] [facility] [address], [place] In 2 weeks Call office to schedule appointment with Dr. Debra Linares for two weeks after drain removal.  [address]  [address]  [phone]      Deep Nima Evans MD In 1 week Go to your dislysis as previously scheduled. [address] [facility] 16[address]  [phone]                 DIET: Renal Diet    ACTIVITY: Activity as tolerated    WOUND CARE: NA    EQUIPMENT needed: own      DISCHARGE MEDICATIONS:   See Medication Reconciliation Form    · It is important that you take the medication exactly as they are prescribed. · Keep your medication in the bottles provided by the pharmacist and keep a list of the medication names, dosages, and times to be taken in your wallet. · Do not take other medications without consulting your doctor. NOTIFY YOUR PHYSICIAN FOR ANY OF THE FOLLOWING:   Fever over 101 degrees for 24 hours. Chest pain, shortness of breath, fever, chills, nausea, vomiting, diarrhea, change in mentation, falling, weakness, bleeding. Severe pain or pain not relieved by medications. Or, any other signs or symptoms that you may have questions about. DISPOSITION:  x  Home With:   OT  PT  HH  RN       SNF/Inpatient Rehab/LTAC    Independent/assisted living    Hospice    Other:       Signed:    Kirsten Lundy MD  6/28/2018  3:16 PM

## 2018-06-22 NOTE — PROGRESS NOTES
Bedside shift change report given to Chavo Crain RN (oncoming nurse) by [email or web address] (offgoing nurse). Report included the following information SBAR, Intake/Output, MAR, Recent Results and Cardiac Rhythm NSR.

## 2018-06-22 NOTE — PROGRESS NOTES
NAME: Michael Hummel        :  1985        MRN:  [id number]        Assessment :    Plan:  - End-stage renal disease - TTS - [facility]     SLE    Anemia    PNA     --No acute need for HD today. Plan HD in AM.    Continue EPO. Subjective:     Chief Complaint:  \"I feel better. \"  No dyspnea. No cough. No N/V. No HA. Review of Systems:    Symptom Y/N Comments  Symptom Y/N Comments   Fever/Chills    Chest Pain     Poor Appetite    Edema     Cough    Abdominal Pain     Sputum    Joint Pain     SOB/IVY    Pruritis/Rash     Nausea/vomit    Tolerating PT/OT     Diarrhea    Tolerating Diet     Constipation    Other       Could not obtain due to:      Objective:     VITALS:   Last 24hrs VS reviewed since prior progress note.  Most recent are:  Visit Vitals    BP (!) 138/99 (BP 1 Location: Left arm, BP Patient Position: At rest)    Pulse 87    Temp 98 °F (36.7 °C)    Resp 17    Ht 5' 5\" (1.651 m)    Wt 67.6 kg (149 lb 0.5 oz)    SpO2 100%    BMI 24.8 kg/m2       Intake/Output Summary (Last 24 hours) at 18 1413  Last data filed at 18 0334   Gross per 24 hour   Intake             1330 ml   Output             2180 ml   Net             -850 ml      Telemetry Reviewed:     PHYSICAL EXAM:  General: NAD  CTA  No edema      Lab Data Reviewed: (see below)    Medications Reviewed: (see below)    PMH/SH reviewed - no change compared to H&P  ________________________________________________________________________  Care Plan discussed with:  Patient     Family      RN     Care Manager                    Consultant:          Comments   >50% of visit spent in counseling and coordination of care       ________________________________________________________________________  Mary Isaacs MD     Procedures: see electronic medical records for all procedures/Xrays and details which  were not copied into this note but were reviewed prior to creation of Plan. LABS:  No results for input(s): WBC, HGB, HCT, PLT, HGBEXT, HCTEXT, PLTEXT, HGBEXT, HCTEXT, PLTEXT in the last 72 hours. Recent Labs      06/22/18   0419  06/21/18   0301  06/20/18   0210   NA  139  140  138   K  3.9  4.6  5.1   CL  102  104  101   CO2  31  30  32   BUN  18  24*  21*   CREA  3.31*  4.34*  4.54*   GLU  91  100  95   CA  8.3*  8.3*  8.3*     No results for input(s): SGOT, GPT, AP, TBIL, TP, ALB, GLOB, GGT, AML, LPSE in the last 72 hours. No lab exists for component: AMYP, HLPSE  No results for input(s): INR, PTP, APTT in the last 72 hours. No lab exists for component: INREXT, INREXT   No results for input(s): FE, TIBC, PSAT, FERR in the last 72 hours. Lab Results   Component Value Date/Time    Folate 13.7 06/17/2018 03:00 PM      No results for input(s): PH, PCO2, PO2 in the last 72 hours. No results for input(s): CPK, CKMB in the last 72 hours.     No lab exists for component: TROPONINI  No components found for: [place]  Lab Results   Component Value Date/Time    Color YELLOW/STRAW 08/12/2016 09:06 PM    Appearance CLEAR 08/12/2016 09:06 PM    Specific gravity 1.017 08/12/2016 09:06 PM    Specific gravity 1.010 07/11/2016 12:44 PM    pH (UA) 7.0 08/12/2016 09:06 PM    Protein 300 (A) 08/12/2016 09:06 PM    Glucose NEGATIVE  08/12/2016 09:06 PM    Ketone TRACE (A) 08/12/2016 09:06 PM    Bilirubin NEGATIVE  07/11/2016 12:44 PM    Urobilinogen 1.0 08/12/2016 09:06 PM    Nitrites NEGATIVE  08/12/2016 09:06 PM    Leukocyte Esterase NEGATIVE  08/12/2016 09:06 PM    Epithelial cells MODERATE (A) 08/12/2016 09:06 PM    Bacteria 1+ (A) 08/12/2016 09:06 PM    WBC 0-4 08/12/2016 09:06 PM    RBC 0-5 08/12/2016 09:06 PM       MEDICATIONS:  Current Facility-Administered Medications   Medication Dose Route Frequency    alteplase (CATHFLO) 1 mg in sterile water (preservative free) 1 mL injection  1 mg InterCATHeter PRN    sodium chloride (NS) flush 10-30 mL  10-30 mL InterCATHeter PRN    sodium chloride (NS) flush 10 mL  10 mL InterCATHeter Q24H    sodium chloride (NS) flush 10 mL  10 mL InterCATHeter PRN    sodium chloride (NS) flush 10-40 mL  10-40 mL InterCATHeter Q8H    sodium chloride (NS) flush 20 mL  20 mL InterCATHeter Q24H    bacitracin 500 unit/gram packet 1 Packet  1 Packet Topical PRN    senna (SENOKOT) tablet 8.6 mg  1 Tab Oral QHS    sodium chloride (NS) flush 5-10 mL  5-10 mL IntraVENous Q8H    sodium chloride (NS) flush 5-10 mL  5-10 mL IntraVENous PRN    oxyCODONE-acetaminophen (PERCOCET) 5-325 mg per tablet 2 Tab  2 Tab Oral Q4H PRN    fentaNYL citrate (PF) injection 50 mcg  50 mcg IntraVENous Q2H PRN    naloxone (NARCAN) injection 0.4 mg  0.4 mg IntraVENous PRN    docusate sodium (COLACE) capsule 100 mg  100 mg Oral BID    ketorolac (TORADOL) injection 15 mg  15 mg IntraVENous Q6H    Vancomycin random- 6/20; please draw at the beginning of dialysis   Other DIALYSIS ONCE    epoetin pia (EPOGEN;PROCRIT) injection 8,000 Units  8,000 Units SubCUTAneous DIALYSIS TUE, THU & SAT    vancomycin (VANCOCIN) 500 mg in 0.9% sodium chloride (MBP/ADV) 100 mL  500 mg IntraVENous DIALYSIS TUE, THU & SAT    albuterol-ipratropium (DUO-NEB) 2.5 MG-0.5 MG/3 ML  3 mL Nebulization Q4H PRN    piperacillin-tazobactam (ZOSYN) 3.375 g in 0.9% sodium chloride (MBP/ADV) 100 mL  3.375 g IntraVENous Q12H    Vancomycin Pharmacy Dosing   Other Rx Dosing/Monitoring    allopurinol (ZYLOPRIM) tablet 100 mg  100 mg Oral DAILY AFTER BREAKFAST    amitriptyline (ELAVIL) tablet 25 mg  25 mg Oral QHS PRN    amLODIPine (NORVASC) tablet 10 mg  10 mg Oral DAILY    cyanocobalamin (VITAMIN B12) tablet 2,500 mcg  2,500 mcg Oral DAILY    gemfibrozil (LOPID) tablet 300 mg  300 mg Oral DAILY    hydroxychloroquine (PLAQUENIL) tablet 200 mg  200 mg Oral BID    pantoprazole (PROTONIX) tablet 40 mg  40 mg Oral ACB    predniSONE (DELTASONE) tablet 5 mg  5 mg Oral DAILY  senna (SENOKOT) tablet 8.6 mg  1 Tab Oral DAILY PRN    ondansetron (ZOFRAN) injection 4 mg  4 mg IntraVENous Q4H PRN    arformoterol (BROVANA) neb solution 15 mcg  15 mcg Nebulization BID RT    And    budesonide (PULMICORT) 500 mcg/2 ml nebulizer suspension  500 mcg Nebulization BID RT    hydrALAZINE (APRESOLINE) 20 mg/mL injection 10 mg  10 mg IntraVENous Q6H PRN    carvedilol (COREG) tablet 25 mg  25 mg Oral BID WITH MEALS    losartan (COZAAR) tablet 50 mg  50 mg Oral DAILY    cloNIDine HCl (CATAPRES) tablet 0.1 mg  0.1 mg Oral BID

## 2018-06-22 NOTE — CONSULTS
ID consult  NAME:  Alex Hemphill                      :   1985                       MRN:   [id number]   Date/Time:  2018 3:56 PM  Subjective:   REASON FOR CONSULT:  Pneumonia  Pt well known to me -multiple hospitalizations  Presented to the ED on  with SOB  On admission she had rt pleural effusion which was thought to be loculated with underlying pneumonia  She was started on vanco and zosyn   She underwent rt VATS  With full pulmonary decortication of rt lower lobe on 18  Rt pleural fluid culture is neg  Pathology is chronic pleuritis  She has no fever          Alex Hemphill  is a 29 y. o.female  with a history of SLE, lupus nephritis leading to ESRD //HTN/ treated for candida peritonitis in Dec 2017  She was then admitted to [facility] between 3/11-18 for peritonitis due to E.coli and had the peritoneal fluid drained many times while in hospital and later underwent washout in OR and loculations were broken  She was sent home with the JENNIFER  drain and followed with surgery as Op and the drain was removed on 18 in their office  She came to the ED  with abdominal pain sob of 2 days on 18   CT done showed Increase in size of lower anterior abdominal collection status post removal of drainage catheter. Anasarca with right greater than left pleural effusions. Renal atrophy. She did not have any fever in the ED even though she said that she had low grade fever of 100 while on dialysis.   IR put a drain on  - they also drained rt pleural fluid  She was on vanco and zosyn and Dced home on  with abdominal drain and Po augmentin          Multiple hospitalizations in the past few months  OCt 14- for left lower lobe pneumonia/effusion- was found to have PE on the rt side  10/27-10/30 possible pneumonia- same infiltrate left lower lobe- sent on augmentin    - for b/l leg swelling and abdominal swelling    17-17- fungal peritonitis- treated with fluconazole for 4 weeks    3/11-18- E.coli peritonitis  - peritonitis      She had  peritoneal dialysis since - had cath placed in  until it was removed in Dec 2017  LMP 2 yrs ago  Medical history   Pericardial effusion  PE  Anemia  HTN  Hypoalbuminemia  Hypercholesterolemia  Candida parapsilosis peritonitis      Past Surgical History:   Procedure Laterality Date    HX  SECTION  11/2006    x1    HX OTHER SURGICAL  9/16/15    INSERTION PD CATH;  Removed 2017    HX VASCULAR ACCESS Right 2017    Double-Lumen henry catheter upper chest         Family History   Problem Relation Age of Onset    Diabetes Father     Hypertension Father     Cancer Other      aunt with breast cancer    Diabetes Mother      Allergies   Allergen Reactions    Compazine [Prochlorperazine Edisylate] Nausea and Vomiting and Palpitations     SH  Lives with her parents and 6year old daughter  Non smoker  No alcohol  No illicit drug use      Current Facility-Administered Medications   Medication Dose Route Frequency Provider Last Rate Last Dose    alteplase (CATHFLO) 1 mg in sterile water (preservative free) 1 mL injection  1 mg InterCATHeter PRN Nelida Chang MD        sodium chloride (NS) flush 10-30 mL  10-30 mL InterCATHeter PRN Nelida Chang MD        sodium chloride (NS) flush 10 mL  10 mL InterCATHeter Q24H Nelida Chang MD   10 mL at 18 0717    sodium chloride (NS) flush 10 mL  10 mL InterCATHeter PRN Nelida Chang MD        sodium chloride (NS) flush 10-40 mL  10-40 mL InterCATHeter Q8H Nelida Chang MD   10 mL at 18 1353    sodium chloride (NS) flush 20 mL  20 mL InterCATHeter Q24H Nelida Chang MD   20 mL at 18 0719    bacitracin 500 unit/gram packet 1 Packet  1 Packet Topical PRN Nelida Chang MD        senna (SENOKOT) tablet 8.6 mg  1 Tab Oral QHS Payton Trinidad MD   8.6 mg at 18 179    sodium chloride (NS) flush 5-10 mL  5-10 mL IntraVENous Atul Friedman MD   10 mL at 06/22/18 1400    sodium chloride (NS) flush 5-10 mL  5-10 mL IntraVENous PRN Alex Wharton MD   10 mL at 06/22/18 0104    oxyCODONE-acetaminophen (PERCOCET) 5-325 mg per tablet 2 Tab  2 Tab Oral Q4H PRN Alex Wharton MD   2 Tab at 06/22/18 1348    fentaNYL citrate (PF) injection 50 mcg  50 mcg IntraVENous Q2H PRN Alex Wharton MD   50 mcg at 06/22/18 1515    naloxone [facility]) injection 0.4 mg  0.4 mg IntraVENous PRN Alex Wharton MD        docusate sodium (COLACE) capsule 100 mg  100 mg Oral BID Alex Wharton MD   100 mg at 06/22/18 1707    ketorolac (TORADOL) injection 15 mg  15 mg IntraVENous Q6H Eric Wayne NP   15 mg at 06/22/18 1352    Vancomycin random- 6/20; please draw at the beginning of dialysis   Other [id number], MD        epoetin pia (EPOGEN;PROCRIT) injection 8,000 Units  8,000 Units SubCUTAneous DIALYSIS KRISTEN CORRIGAN & KEVIN Brandon III, MD   8,000 Units at 06/21/18 2109    vancomycin (VANCOCIN) 500 mg in 0.9% sodium chloride (MBP/ADV) 100 mL  500 mg IntraVENous DIALYSIS KRISTEN CORRIGAN & KEVIN Brandon MD 50 mL/hr at 06/21/18 2109 500 mg at 06/21/18 2109    albuterol-ipratropium (DUO-NEB) 2.5 MG-0.5 MG/3 ML  3 mL Nebulization Q4H PRN Jorge L Calles MD        piperacillin-tazobactam (ZOSYN) 3.375 g in 0.9% sodium chloride (MBP/ADV) 100 mL  3.375 g IntraVENous Q12H Loretta Rosenberg MD 25 mL/hr at 06/22/18 1104 3.375 g at 06/22/18 1104    Vancomycin Pharmacy Dosing   Other Rx Dosing/Monitoring Loretta Rosenberg MD        allopurinol (ZYLOPRIM) tablet 100 mg  100 mg Oral DAILY AFTER BREAKFAST Lroetta Rosenberg MD   100 mg at 06/22/18 0929    amitriptyline (ELAVIL) tablet 25 mg  25 mg Oral QHS PRN Loretta Rosenberg MD   25 mg at 06/21/18 1952    amLODIPine (NORVASC) tablet 10 mg  10 mg Oral DAILY Loretta Rosenberg MD   10 mg at 06/22/18 0929    cyanocobalamin (VITAMIN B12) tablet 2,500 mcg  2,500 mcg Oral DAILY Surinder José MD   2,500 mcg at 06/22/18 1058    gemfibrozil (LOPID) tablet 300 mg  300 mg Oral DAILY Surinder José MD   300 mg at 06/22/18 4497    hydroxychloroquine (PLAQUENIL) tablet 200 mg  200 mg Oral BID Surinder José MD   200 mg at 06/22/18 1707    pantoprazole (PROTONIX) tablet 40 mg  40 mg Oral ACB Jorge L Flower MD   40 mg at 06/22/18 0716    predniSONE (DELTASONE) tablet 5 mg  5 mg Oral DAILY Surinder José MD   5 mg at 06/22/18 0936    senna (SENOKOT) tablet 8.6 mg  1 Tab Oral DAILY PRN Surinder José MD        ondansetron (ZOFRAN) injection 4 mg  4 mg IntraVENous Q4H PRN Surinder José MD        arformoterol (BROVANA) neb solution 15 mcg  15 mcg Nebulization BID RT Surinder José MD   15 mcg at 06/22/18 3225    And    budesonide (PULMICORT) 500 mcg/2 ml nebulizer suspension  500 mcg Nebulization BID RT Surinder José MD   500 mcg at 06/22/18 5855    hydrALAZINE (APRESOLINE) 20 mg/mL injection 10 mg  10 mg IntraVENous Q6H PRN Surinder José MD        carvedilol (COREG) tablet 25 mg  25 mg Oral BID WITH MEALS Buzz Rojas MD   25 mg at 06/22/18 1707    losartan (COZAAR) tablet 50 mg  50 mg Oral DAILY Buzz Rojas MD   50 mg at 06/22/18 4329    cloNIDine HCl (CATAPRES) tablet 0.1 mg  0.1 mg Oral BID Buzz Rojas MD   0.1 mg at 06/22/18 1707        REVIEW OF SYSTEMS:     Const:NO,  NO chills,   Eyes:  negative diplopia or visual changes, negative eye pain  ENT:  negative coryza, negative sore throat  Resp:   cough, no hemoptysis, has dyspnea  Cards: rt chest pain  GI abdominal pain  Skin:  negative for rash and pruritus  Heme: negative for easy bruising and gum/nose bleeding  MS: Back pain  Neurolo:negative for headaches, dizziness, vertigo, memory problems       Objective:   VITALS:    Visit Vitals    BP (!) 147/103 (BP 1 Location: Left arm, BP Patient Position: At rest)    Pulse 95    Temp 99.1 °F (37.3 °C)    Resp 18    Ht 5' 5\" (1.651 m)    Wt 149 lb 0.5 oz (67.6 kg)    SpO2 98%    BMI 24.8 kg/m2     PHYSICAL EXAM:   General:    Alert, cooperative, no distress, . Head:   Normocephalic, without obvious abnormality, atraumatic. Eyes:   Conjunctivae clear, anicteric sclerae. Pupils are equal  Nose:  Nares normal. No drainage or sinus tenderness.   Throat:    Lips, mucosa, and tongue normal.  No Thrush  Neck:  Supple,   Lungs:   B/l air entry  Decreased rt base  Chest drain rt  Heart:   s1s2  Abdomen:   soft  Drain rt side  Extremities: Rt arm graft      Skin:     Dry skin- striae over arms/abdomen  Lymph: Cervical, supraclavicular normal.  Neurologic: Grossly non-focal  Left IJ line    Pertinent Labs  Wbc 3.1   Hb 8.3    Albumin 2.2  Pleural fluid culture NG so far    IMAGING RESULTS:    Impression/Recommendation    29 yr female with SLE/ lupus nephritis/HTN -h/o peritoneal dialysis until Dec 2017 /Candida peritonitis in Dec 2017/ treated with 4 weeks of fluconazole  E.coli peritonitis in [facility] /april 2018    Admitted with sob    Rt pleural effusion loculated- Concern for underlying pneumonia and parapneumonic effusion- S/p VATS - culture neg so far- possible this is transudate especially in may 2018 the pleural fluid analysis did not show any neutrophils  On vanco and zosyn day 7  We may be able to discontinue early next week      H/o Peritonitis and peritoneal abscess-treated  Still has a drain      Chronic leucopenia- has lupus    SLE-was on /plaquenil and prednisone    ESRD due to lupus nephritis on dialysis    HTN-on amlodipine/cozaar    H/o PE- on elaquis    Discussed with patient   ID will follow peripherally this weekend-call if needed

## 2018-06-22 NOTE — PROGRESS NOTES
Patient was discussed during IDR this am. She was transferred from [facility] following a Right video-assisted thoracoscopy, Full pulmonary decortication of the right lower lobe on 6/21/18. Patient has dialysis on Tues, Thurs and Sat. Patient has [facility] for SN/OT and has caregivers at home. She will need [place] [phone] orders at discharge. Brent Wang MSA, RN, CRM.

## 2018-06-22 NOTE — PROGRESS NOTES
Thoracic Surgery Simple Progress Note    Admit Date: 2018  POD: 1 Day Post-Op      Procedure:  Procedure(s):  RIGHT VIDEO ASSITED THORASCOPY, DECORTICATION      Subjective:     Patient has complaints: No significant medical complaints    Review of Systems:    CARDIAC: positive for HTN  RESP: positive for pleural effusion  NEURO:  negative  INCISION: Clean, dry, and intact  EXT: Denies new swelling or pain in the legs or calves. Objective:     Blood pressure (!) 138/99, pulse 92, temperature 98.3 °F (36.8 °C), resp. rate 16, height 5' 5\" (1.651 m), weight 149 lb 0.5 oz (67.6 kg), SpO2 99 %. Temp (24hrs), Av.2 °F (36.8 °C), Min:97.8 °F (36.6 °C), Max:98.8 °F (37.1 °C)        Hemodynamics    PAP    CO    CI        1901 -  0700  In: 1630 [P.O.:120; I.V.:1500]  Out: 3980 [Drains:20]    EXAM:  GENERAL: VSS, afrible, alert and cooperative  HEART:  regular rate and rhythm  LUNG: clear to auscultation bilaterally, diminished breath sounds R base, chest tube in place, no air leak  NEURO:  normal without focal findings  mental status, speech normal, alert and oriented x iii  INCISION: Clean, dry, and intact  EXTREMITIES:No evidence of DVT seen on physical exam.  GI/: Abd soft, nonterder with + bowel sounds. HD T/Th/Sat    Labs:  Recent Results (from the past 24 hour(s))   METABOLIC PANEL, BASIC    Collection Time: 18  4:19 AM   Result Value Ref Range    Sodium 139 136 - 145 mmol/L    Potassium 3.9 3.5 - 5.1 mmol/L    Chloride 102 97 - 108 mmol/L    CO2 31 21 - 32 mmol/L    Anion gap 6 5 - 15 mmol/L    Glucose 91 65 - 100 mg/dL    BUN 18 6 - 20 MG/DL    Creatinine 3.31 (H) 0.55 - 1.02 MG/DL    BUN/Creatinine ratio 5 (L) 12 - 20      GFR est AA 20 (L) >60 ml/min/1.73m2    GFR est non-AA 16 (L) >60 ml/min/1.73m2    Calcium 8.3 (L) 8.5 - 10.1 MG/DL       Assessment:   No evidence of DVT.   Principal Problem:    Sepsis ([address] (2018)    Active Problems:    Troponin level elevated (6/17/2018)      PATH:    pending                        Plan/Recommendations:   Leave chest tube to suction except when ambulating  Consult PT  Needs to be OOB for meals and ambulate if able    See orders    Signed By: Melania PINK

## 2018-06-22 NOTE — PROGRESS NOTES
Hospitalist Progress Note  Carolyn Miles MD  Answering service: [phone] or [phone] from in house phone        Date of Service:  2018  NAME:  Marjorie Arango  :  1985  MRN:  [id number]      Admission Summary: This is a 29-year-old woman with a past medical history significant for end-stage renal disease on hemodialysis, lupus, asthma, thromboembolism, dyslipidemia, was in her usual state of health until the day of presentation at the emergency room when the patient developed shortness of breath. The shortness of breath is progressive, it is with little or no activity. Patient had routine dialysis done. After the dialysis, that is when the patient developed the shortness of breath. Patient used to be on peritoneal dialysis, but was converted to hemodialysis because of recurrent infection. The patient was last admitted to this hospital from 2018 to 2018. She was admitted and treated for sepsis, attributed to multiple abdominal abscesses. Patient underwent drainage of the abdominal abscess. Patient also underwent drainage of a right pleural effusion. She was discharged home to complete a course of Augmentin. She stated that she has been having shortness of breath on and off since she was discharged from hospital.  A day before coming to the emergency room here, she was seen at another emergency room. Patient was evaluated, but was not admitted. When the patient arrived at the emergency room, she was found to have elevated lactic acid level, low grade temperature, tachycardia. Code sepsis was called. The chest x-ray shows evidence of pneumonia Similar presentation last admission as well    Interval history / Subjective:    s/p Right video-assisted thoracoscopy and Full pulmonary decortication of the right lower lobe.   Pt doing well , no CP/SOB     Assessment & Plan:     Health care associated Pneumonia with partially loculated pleural effusion (?recurrent)  -CXR 6/17 Unchanged mild right pleural effusion with patchy opacification right base  -Vanc/Zosyn  -Follow cultures  -CT chest/abd 6/17 The pleural fluid on the right extends more superiorly in the right hemithorax and there is a more focal collection laterally measuring 5.4 cm may be partially loculated.  -Last admission patient had US guided removal with chest tube placed  -Consult thoracic surgery- s/p VATS on 6/21 CT tube in place draining to suctuion  - abx MGMT per ID    Abdominal cavity abscess (last admission)  -CT abd 6/17 Pigtail catheter overlies collection in the pelvis anteriorly which has diminished considerably in size since 5/16/2018 and there is a small amount of residual fluid noted    Suspected sepsis  -as above  -blood culture 6/16 alpha Strep in  1/2 bottles  -Repeat blood culture 6/18  Neg so far     ESRD  -on HD (TTS)  -Appreciate Nephrology    Anemia  -likely of chronic disease  -on EPO     Lupus  -Outpatient being followed by Dr Varun Hanna  -hold imuran     Chronic DVT  -on Eliquis - on hold for surgery   -resume tomorrow if OK with surgery     Elevated troponin  -Likely from ESRD  - Cardiology consulted  -Echo EF 60-65% with no RWMA    Dyslipidemia  -continue home meds    Diet  Renal diet    Code status: Full  DVT prophylaxis: Eliquis  PTA: home    Plan: Continue antibiotics, follow cultures,    Care Plan discussed with: Patient/Family  Disposition: TBD      Hospital Problems  Date Reviewed: 6/21/2018          Codes Class Noted POA    Troponin level elevated ICD-10-CM: R74.8  ICD-9-CM: 790.6  6/17/2018 Unknown        * (Principal)Sepsis ([facility] [address] ICD-10-CM: A41.9  ICD-9-CM: 038.9, 995.91  4/29/2018 Yes                Review of Systems:   A comprehensive review of systems was negative except for that written in the HPI. Vital Signs:    Last 24hrs VS reviewed since prior progress note.  Most recent are:  Visit Vitals    BP (!) 138/99 (BP 1 Location: Left arm, BP Patient Position: At rest)    Pulse 87    Temp 98 °F (36.7 °C)    Resp 17    Ht 5' 5\" (1.651 m)    Wt 67.6 kg (149 lb 0.5 oz)    SpO2 100%    BMI 24.8 kg/m2         Intake/Output Summary (Last 24 hours) at 06/22/18 1123  Last data filed at 06/22/18 0334   Gross per 24 hour   Intake             1330 ml   Output             2250 ml   Net             -920 ml        Physical Examination:             Constitutional:  No acute distress, cooperative, pleasant    ENT:  Oral mucous moist, oropharynx benign. Neck supple,    Resp:  Rt sided crackles up to mid lung CT tube in place left lung CTA   CV:  Regular rhythm, normal rate, no murmurs, gallops, rubs    GI:  Soft, non distended, non tender. normoactive bowel sounds, no hepatosplenomegaly, drain in place    Musculoskeletal:  No edema, warm, 2+ pulses throughout,Right AV fistula in place    Neurologic:  Moves all extremities. AAOx3, CN II-XII reviewed     Skin:  Good turgor, no rashes or ulcers       Data Review:    Review and/or order of clinical lab test      Labs:     No results for input(s): WBC, HGB, HCT, PLT, HGBEXT, HCTEXT, PLTEXT, HGBEXT, HCTEXT, PLTEXT in the last 72 hours. Recent Labs      06/22/18   0419  06/21/18   0301  06/20/18   0210   NA  139  140  138   K  3.9  4.6  5.1   CL  102  104  101   CO2  31  30  32   BUN  18  24*  21*   CREA  3.31*  4.34*  4.54*   GLU  91  100  95   CA  8.3*  8.3*  8.3*     No results for input(s): SGOT, GPT, ALT, AP, TBIL, TBILI, TP, ALB, GLOB, GGT, AML, LPSE in the last 72 hours. No lab exists for component: AMYP, HLPSE  No results for input(s): INR, PTP, APTT in the last 72 hours. No lab exists for component: INREXT, INREXT   No results for input(s): FE, TIBC, PSAT, FERR in the last 72 hours. Lab Results   Component Value Date/Time    Folate 13.7 06/17/2018 03:00 PM      No results for input(s): PH, PCO2, PO2 in the last 72 hours.   No results for input(s): CPK, CKNDX, TROIQ in the last 72 hours.    No lab exists for component: CPKMB  Lab Results   Component Value Date/Time    Cholesterol, total 117 09/25/2015 02:02 PM    HDL Cholesterol 31 (L) 09/25/2015 02:02 PM    LDL, calculated 57 09/25/2015 02:02 PM    Triglyceride 146 09/25/2015 02:02 PM    CHOL/HDL Ratio 7.7 (H) 05/31/2009 10:30 AM     Lab Results   Component Value Date/Time    Glucose (POC) 123 (H) 04/20/2018 05:33 PM    Glucose (POC) 95 04/20/2018 11:19 AM    Glucose (POC) 100 04/20/2018 07:18 AM    Glucose (POC) 119 (H) 04/19/2018 09:11 PM    Glucose (POC) 86 04/19/2018 04:30 PM     Lab Results   Component Value Date/Time    Color YELLOW/STRAW 08/12/2016 09:06 PM    Appearance CLEAR 08/12/2016 09:06 PM    Specific gravity 1.017 08/12/2016 09:06 PM    Specific gravity 1.010 07/11/2016 12:44 PM    pH (UA) 7.0 08/12/2016 09:06 PM    Protein 300 (A) 08/12/2016 09:06 PM    Glucose NEGATIVE  08/12/2016 09:06 PM    Ketone TRACE (A) 08/12/2016 09:06 PM    Bilirubin NEGATIVE  07/11/2016 12:44 PM    Urobilinogen 1.0 08/12/2016 09:06 PM    Nitrites NEGATIVE  08/12/2016 09:06 PM    Leukocyte Esterase NEGATIVE  08/12/2016 09:06 PM    Epithelial cells MODERATE (A) 08/12/2016 09:06 PM    Bacteria 1+ (A) 08/12/2016 09:06 PM    WBC 0-4 08/12/2016 09:06 PM    RBC 0-5 08/12/2016 09:06 PM         Medications Reviewed:     Current Facility-Administered Medications   Medication Dose Route Frequency    alteplase (CATHFLO) 1 mg in sterile water (preservative free) 1 mL injection  1 mg InterCATHeter PRN    sodium chloride (NS) flush 10-30 mL  10-30 mL InterCATHeter PRN    sodium chloride (NS) flush 10 mL  10 mL InterCATHeter Q24H    sodium chloride (NS) flush 10 mL  10 mL InterCATHeter PRN    sodium chloride (NS) flush 10-40 mL  10-40 mL InterCATHeter Q8H    sodium chloride (NS) flush 20 mL  20 mL InterCATHeter Q24H    bacitracin 500 unit/gram packet 1 Packet  1 Packet Topical PRN    senna (SENOKOT) tablet 8.6 mg  1 Tab Oral QHS    sodium chloride (NS) flush 5-10 mL  5-10 mL IntraVENous Q8H    sodium chloride (NS) flush 5-10 mL  5-10 mL IntraVENous PRN    oxyCODONE-acetaminophen (PERCOCET) 5-325 mg per tablet 2 Tab  2 Tab Oral Q4H PRN    fentaNYL citrate (PF) injection 50 mcg  50 mcg IntraVENous Q2H PRN    naloxone (NARCAN) injection 0.4 mg  0.4 mg IntraVENous PRN    docusate sodium (COLACE) capsule 100 mg  100 mg Oral BID    ketorolac (TORADOL) injection 15 mg  15 mg IntraVENous Q6H    Vancomycin random- 6/20; please draw at the beginning of dialysis   Other DIALYSIS ONCE    epoetin pia (EPOGEN;PROCRIT) injection 8,000 Units  8,000 Units SubCUTAneous DIALYSIS TUE, THU & SAT    vancomycin (VANCOCIN) 500 mg in 0.9% sodium chloride (MBP/ADV) 100 mL  500 mg IntraVENous DIALYSIS TUE, THU & SAT    albuterol-ipratropium (DUO-NEB) 2.5 MG-0.5 MG/3 ML  3 mL Nebulization Q4H PRN    piperacillin-tazobactam (ZOSYN) 3.375 g in 0.9% sodium chloride (MBP/ADV) 100 mL  3.375 g IntraVENous Q12H    Vancomycin Pharmacy Dosing   Other Rx Dosing/Monitoring    allopurinol (ZYLOPRIM) tablet 100 mg  100 mg Oral DAILY AFTER BREAKFAST    amitriptyline (ELAVIL) tablet 25 mg  25 mg Oral QHS PRN    amLODIPine (NORVASC) tablet 10 mg  10 mg Oral DAILY    cyanocobalamin (VITAMIN B12) tablet 2,500 mcg  2,500 mcg Oral DAILY    gemfibrozil (LOPID) tablet 300 mg  300 mg Oral DAILY    hydroxychloroquine (PLAQUENIL) tablet 200 mg  200 mg Oral BID    pantoprazole (PROTONIX) tablet 40 mg  40 mg Oral ACB    predniSONE (DELTASONE) tablet 5 mg  5 mg Oral DAILY    senna (SENOKOT) tablet 8.6 mg  1 Tab Oral DAILY PRN    ondansetron (ZOFRAN) injection 4 mg  4 mg IntraVENous Q4H PRN    arformoterol (BROVANA) neb solution 15 mcg  15 mcg Nebulization BID RT    And    budesonide (PULMICORT) 500 mcg/2 ml nebulizer suspension  500 mcg Nebulization BID RT    hydrALAZINE (APRESOLINE) 20 mg/mL injection 10 mg  10 mg IntraVENous Q6H PRN    carvedilol (COREG) tablet 25 mg  25 mg Oral BID WITH MEALS    losartan (COZAAR) tablet 50 mg  50 mg Oral DAILY    cloNIDine HCl (CATAPRES) tablet 0.1 mg  0.1 mg Oral BID     ______________________________________________________________________  EXPECTED LENGTH OF STAY: 10d 7h  ACTUAL LENGTH OF STAY:          Virginia Chaney MD

## 2018-06-22 NOTE — PROGRESS NOTES
Problem: Falls - Risk of  Goal: *Absence of Falls  Document Alex Fall Risk and appropriate interventions in the flowsheet.    Outcome: Progressing Towards Goal  Fall Risk Interventions:            Medication Interventions: Teach patient to arise slowly    Elimination Interventions: Call light in reach    History of Falls Interventions: Door open when patient unattended

## 2018-06-22 NOTE — PROGRESS NOTES
NUTRITION  Pt seen for:       []           Supplements  []             PO intake check   []           Food Allergies  []             Food Preferences/tolerances    [x]           Rescreen   []             Education    []           Diet order clarification []             Other            RECOMMENDATIONS:   Continue daily weights and diet as order    SUBJECTIVE/OBJECTIVE:   Chart reviewed, discussed with RN and team during interdisciplinary rounds. Pt admitted with sepsis. Pt is s/p right VATs. Pt was sleeping very soundly during visit. Spoke with daughter who is also at the bedside. Pt ate 100% of breakfast tray this morning. She typically drinks Nepro, once/day at home. RD to resume and daughter aware. Minimal nutrition risk identified. Will rescreen as indicated.       Diet:  Cardiac Regular 2 gm Sodium    Intake: [x]           Good     []           Fair      []           Poor   Patient Vitals for the past 100 hrs:   % Diet Eaten   06/22/18 0950 100 %   06/19/18 1448 100 %   06/18/18 1908 100 %   06/18/18 1200 100 %   06/18/18 0800 100 %     Weight Changes:   Last 3 Recorded Weights in this Encounter    06/20/18 0614 06/21/18 0634 06/22/18 0700   Weight: 68.9 kg (151 lb 15.8 oz) 68.9 kg (151 lb 14.4 oz) 67.6 kg (149 lb 0.5 oz)     Nutrition Problems Identified:  [x]      None    PLAN:   [x]           Rescreen per screening protocol  [x]           Add Supplements    Rescreen:           [x]            Not at Nutrition Risk, rescreen per screening protocol    Neeta Alvares, [address] [facility]

## 2018-06-22 NOTE — ROUTINE PROCESS
Bedside and Verbal shift change report given to Dionicio Jones (oncoming nurse) by Gigi Begum (offgoing nurse).  Report included the following information SBAR, Kardex, Intake/Output, Recent Results and Cardiac Rhythm SR.

## 2018-06-23 ENCOUNTER — APPOINTMENT (OUTPATIENT)
Dept: GENERAL RADIOLOGY | Age: 33
DRG: 853 | End: 2018-06-23
Attending: NURSE PRACTITIONER
Payer: MEDICARE

## 2018-06-23 LAB
BACTERIA SPEC CULT: NORMAL
HBV SURFACE AG SER QL: <0.1 INDEX
HBV SURFACE AG SER QL: NEGATIVE
SERVICE CMNT-IMP: NORMAL

## 2018-06-23 PROCEDURE — 74011250637 HC RX REV CODE- 250/637: Performed by: INTERNAL MEDICINE

## 2018-06-23 PROCEDURE — 74011250637 HC RX REV CODE- 250/637: Performed by: SPECIALIST

## 2018-06-23 PROCEDURE — 74011250637 HC RX REV CODE- 250/637: Performed by: THORACIC SURGERY (CARDIOTHORACIC VASCULAR SURGERY)

## 2018-06-23 PROCEDURE — 74011250636 HC RX REV CODE- 250/636: Performed by: HOSPITALIST

## 2018-06-23 PROCEDURE — 74011250636 HC RX REV CODE- 250/636: Performed by: NURSE PRACTITIONER

## 2018-06-23 PROCEDURE — 74011000258 HC RX REV CODE- 258: Performed by: HOSPITALIST

## 2018-06-23 PROCEDURE — 87340 HEPATITIS B SURFACE AG IA: CPT | Performed by: INTERNAL MEDICINE

## 2018-06-23 PROCEDURE — 74011250636 HC RX REV CODE- 250/636: Performed by: INTERNAL MEDICINE

## 2018-06-23 PROCEDURE — 65660000000 HC RM CCU STEPDOWN

## 2018-06-23 PROCEDURE — 36415 COLL VENOUS BLD VENIPUNCTURE: CPT | Performed by: INTERNAL MEDICINE

## 2018-06-23 PROCEDURE — 74011000250 HC RX REV CODE- 250: Performed by: INTERNAL MEDICINE

## 2018-06-23 PROCEDURE — 74011000258 HC RX REV CODE- 258: Performed by: INTERNAL MEDICINE

## 2018-06-23 PROCEDURE — 71045 X-RAY EXAM CHEST 1 VIEW: CPT

## 2018-06-23 PROCEDURE — 74011636637 HC RX REV CODE- 636/637: Performed by: INTERNAL MEDICINE

## 2018-06-23 PROCEDURE — 74011250636 HC RX REV CODE- 250/636: Performed by: THORACIC SURGERY (CARDIOTHORACIC VASCULAR SURGERY)

## 2018-06-23 PROCEDURE — 94640 AIRWAY INHALATION TREATMENT: CPT

## 2018-06-23 RX ADMIN — FENTANYL CITRATE 50 MCG: 50 INJECTION, SOLUTION INTRAMUSCULAR; INTRAVENOUS at 06:10

## 2018-06-23 RX ADMIN — FENTANYL CITRATE 50 MCG: 50 INJECTION, SOLUTION INTRAMUSCULAR; INTRAVENOUS at 03:57

## 2018-06-23 RX ADMIN — FENTANYL CITRATE 50 MCG: 50 INJECTION, SOLUTION INTRAMUSCULAR; INTRAVENOUS at 21:53

## 2018-06-23 RX ADMIN — OXYCODONE HYDROCHLORIDE AND ACETAMINOPHEN 2 TABLET: 5; 325 TABLET ORAL at 08:34

## 2018-06-23 RX ADMIN — KETOROLAC TROMETHAMINE 15 MG: 30 INJECTION, SOLUTION INTRAMUSCULAR at 01:53

## 2018-06-23 RX ADMIN — BUDESONIDE 500 MCG: 0.5 INHALANT RESPIRATORY (INHALATION) at 20:55

## 2018-06-23 RX ADMIN — Medication 10 ML: at 14:00

## 2018-06-23 RX ADMIN — OXYCODONE HYDROCHLORIDE AND ACETAMINOPHEN 2 TABLET: 5; 325 TABLET ORAL at 22:45

## 2018-06-23 RX ADMIN — GEMFIBROZIL 300 MG: 600 TABLET ORAL at 10:36

## 2018-06-23 RX ADMIN — AMITRIPTYLINE HYDROCHLORIDE 25 MG: 50 TABLET, FILM COATED ORAL at 21:54

## 2018-06-23 RX ADMIN — AMLODIPINE BESYLATE 10 MG: 5 TABLET ORAL at 10:35

## 2018-06-23 RX ADMIN — Medication 10 ML: at 06:10

## 2018-06-23 RX ADMIN — BUDESONIDE 500 MCG: 0.5 INHALANT RESPIRATORY (INHALATION) at 08:25

## 2018-06-23 RX ADMIN — EPOETIN ALFA 8000 UNITS: 4000 SOLUTION INTRAVENOUS; SUBCUTANEOUS at 22:12

## 2018-06-23 RX ADMIN — PIPERACILLIN SODIUM,TAZOBACTAM SODIUM 3.38 G: 3; .375 INJECTION, POWDER, FOR SOLUTION INTRAVENOUS at 22:46

## 2018-06-23 RX ADMIN — Medication 10 ML: at 21:57

## 2018-06-23 RX ADMIN — PIPERACILLIN SODIUM,TAZOBACTAM SODIUM 3.38 G: 3; .375 INJECTION, POWDER, FOR SOLUTION INTRAVENOUS at 13:45

## 2018-06-23 RX ADMIN — DOCUSATE SODIUM 100 MG: 100 CAPSULE, LIQUID FILLED ORAL at 10:35

## 2018-06-23 RX ADMIN — CARVEDILOL 25 MG: 12.5 TABLET, FILM COATED ORAL at 11:38

## 2018-06-23 RX ADMIN — HYDROXYCHLOROQUINE SULFATE 200 MG: 200 TABLET, FILM COATED ORAL at 10:35

## 2018-06-23 RX ADMIN — ONDANSETRON 4 MG: 2 INJECTION INTRAMUSCULAR; INTRAVENOUS at 22:54

## 2018-06-23 RX ADMIN — FENTANYL CITRATE 50 MCG: 50 INJECTION, SOLUTION INTRAMUSCULAR; INTRAVENOUS at 11:38

## 2018-06-23 RX ADMIN — Medication 1 CAPSULE: at 10:35

## 2018-06-23 RX ADMIN — PREDNISONE 5 MG: 5 TABLET ORAL at 10:35

## 2018-06-23 RX ADMIN — KETOROLAC TROMETHAMINE 15 MG: 30 INJECTION, SOLUTION INTRAMUSCULAR at 18:00

## 2018-06-23 RX ADMIN — HYDROXYCHLOROQUINE SULFATE 200 MG: 200 TABLET, FILM COATED ORAL at 17:03

## 2018-06-23 RX ADMIN — ARFORMOTEROL TARTRATE 15 MCG: 15 SOLUTION RESPIRATORY (INHALATION) at 20:55

## 2018-06-23 RX ADMIN — FENTANYL CITRATE 50 MCG: 50 INJECTION, SOLUTION INTRAMUSCULAR; INTRAVENOUS at 17:00

## 2018-06-23 RX ADMIN — SENNOSIDES 8.6 MG: 8.6 TABLET, FILM COATED ORAL at 21:54

## 2018-06-23 RX ADMIN — CLONIDINE HYDROCHLORIDE 0.1 MG: 0.1 TABLET ORAL at 17:03

## 2018-06-23 RX ADMIN — FENTANYL CITRATE 50 MCG: 50 INJECTION, SOLUTION INTRAMUSCULAR; INTRAVENOUS at 14:13

## 2018-06-23 RX ADMIN — Medication 10 ML: at 05:32

## 2018-06-23 RX ADMIN — DOCUSATE SODIUM 100 MG: 100 CAPSULE, LIQUID FILLED ORAL at 17:03

## 2018-06-23 RX ADMIN — CARVEDILOL 25 MG: 12.5 TABLET, FILM COATED ORAL at 17:03

## 2018-06-23 RX ADMIN — FENTANYL CITRATE 50 MCG: 50 INJECTION, SOLUTION INTRAMUSCULAR; INTRAVENOUS at 19:49

## 2018-06-23 RX ADMIN — KETOROLAC TROMETHAMINE 15 MG: 30 INJECTION, SOLUTION INTRAMUSCULAR at 23:14

## 2018-06-23 RX ADMIN — ARFORMOTEROL TARTRATE 15 MCG: 15 SOLUTION RESPIRATORY (INHALATION) at 08:25

## 2018-06-23 RX ADMIN — LOSARTAN POTASSIUM 50 MG: 50 TABLET ORAL at 10:35

## 2018-06-23 RX ADMIN — ALLOPURINOL 100 MG: 100 TABLET ORAL at 10:35

## 2018-06-23 RX ADMIN — FENTANYL CITRATE 50 MCG: 50 INJECTION, SOLUTION INTRAMUSCULAR; INTRAVENOUS at 01:53

## 2018-06-23 RX ADMIN — CLONIDINE HYDROCHLORIDE 0.1 MG: 0.1 TABLET ORAL at 10:35

## 2018-06-23 RX ADMIN — VANCOMYCIN HYDROCHLORIDE 500 MG: 500 INJECTION, POWDER, LYOPHILIZED, FOR SOLUTION INTRAVENOUS at 22:06

## 2018-06-23 RX ADMIN — Medication 2500 MCG: at 10:36

## 2018-06-23 RX ADMIN — Medication 20 ML: at 06:10

## 2018-06-23 RX ADMIN — KETOROLAC TROMETHAMINE 15 MG: 30 INJECTION, SOLUTION INTRAMUSCULAR at 05:31

## 2018-06-23 NOTE — PROGRESS NOTES
Garrett Franco        :  1985        MRN:  [id number]        Assessment :    Plan:  - End-stage renal disease - TTS - [facility]     SLE    Anemia    PNA/Right paraneumonic loculated effusion-> s/p VATS      --HD today. Continue EPO. Add Nepro    May need vascular surgery to evaluate access next week             Subjective:     Chief Complaint: Seen on HD at  8:45am. Elevated venous pressures/clots noted. Chest tube site still sore. Good appetite reported    Review of Systems:    Symptom Y/N Comments  Symptom Y/N Comments   Fever/Chills    Chest Pain     Poor Appetite    Edema     Cough    Abdominal Pain     Sputum    Joint Pain     SOB/IVY    Pruritis/Rash     Nausea/vomit    Tolerating PT/OT     Diarrhea    Tolerating Diet     Constipation    Other       Could not obtain due to:      Objective:     VITALS:   Last 24hrs VS reviewed since prior progress note.  Most recent are:  Visit Vitals    /90 (BP 1 Location: Left arm, BP Patient Position: At rest)    Pulse 96    Temp 98 °F (36.7 °C)    Resp 18    Ht 5' 5\" (1.651 m)    Wt 70.3 kg (154 lb 15.7 oz)    SpO2 100%    BMI 25.79 kg/m2       Intake/Output Summary (Last 24 hours) at 18 0835  Last data filed at 18 0357   Gross per 24 hour   Intake              220 ml   Output              250 ml   Net              -30 ml      Telemetry Reviewed:     PHYSICAL EXAM:  General: NAD  CTA  Right chest tube  No edema      Lab Data Reviewed: (see below)    Medications Reviewed: (see below)    PMH/SH reviewed - no change compared to H&P  ________________________________________________________________________  Care Plan discussed with:  Patient Y    Family  Y    HD RN Y    Care Manager                    Consultant:          Comments   >50% of visit spent in counseling and coordination of care ________________________________________________________________________  Cliff Lang MD     Procedures: see electronic medical records for all procedures/Xrays and details which  were not copied into this note but were reviewed prior to creation of Plan. LABS:  No results for input(s): WBC, HGB, HCT, PLT, HGBEXT, HCTEXT, PLTEXT, HGBEXT, HCTEXT, PLTEXT in the last 72 hours. Recent Labs      06/22/18   0419  06/21/18   0301   NA  139  140   K  3.9  4.6   CL  102  104   CO2  31  30   BUN  18  24*   CREA  3.31*  4.34*   GLU  91  100   CA  8.3*  8.3*     No results for input(s): SGOT, GPT, AP, TBIL, TP, ALB, GLOB, GGT, AML, LPSE in the last 72 hours. No lab exists for component: AMYP, HLPSE  No results for input(s): INR, PTP, APTT in the last 72 hours. No lab exists for component: INREXT, INREXT   No results for input(s): FE, TIBC, PSAT, FERR in the last 72 hours. Lab Results   Component Value Date/Time    Folate 13.7 06/17/2018 03:00 PM      No results for input(s): PH, PCO2, PO2 in the last 72 hours. No results for input(s): CPK, CKMB in the last 72 hours.     No lab exists for component: TROPONINI  No components found for: [place]  Lab Results   Component Value Date/Time    Color YELLOW/STRAW 08/12/2016 09:06 PM    Appearance CLEAR 08/12/2016 09:06 PM    Specific gravity 1.017 08/12/2016 09:06 PM    Specific gravity 1.010 07/11/2016 12:44 PM    pH (UA) 7.0 08/12/2016 09:06 PM    Protein 300 (A) 08/12/2016 09:06 PM    Glucose NEGATIVE  08/12/2016 09:06 PM    Ketone TRACE (A) 08/12/2016 09:06 PM    Bilirubin NEGATIVE  07/11/2016 12:44 PM    Urobilinogen 1.0 08/12/2016 09:06 PM    Nitrites NEGATIVE  08/12/2016 09:06 PM    Leukocyte Esterase NEGATIVE  08/12/2016 09:06 PM    Epithelial cells MODERATE (A) 08/12/2016 09:06 PM    Bacteria 1+ (A) 08/12/2016 09:06 PM    WBC 0-4 08/12/2016 09:06 PM    RBC 0-5 08/12/2016 09:06 PM       MEDICATIONS:  Current Facility-Administered Medications   Medication Dose Route Frequency    alteplase (CATHFLO) 1 mg in sterile water (preservative free) 1 mL injection  1 mg InterCATHeter PRN    sodium chloride (NS) flush 10-30 mL  10-30 mL InterCATHeter PRN    sodium chloride (NS) flush 10 mL  10 mL InterCATHeter Q24H    sodium chloride (NS) flush 10 mL  10 mL InterCATHeter PRN    sodium chloride (NS) flush 10-40 mL  10-40 mL InterCATHeter Q8H    sodium chloride (NS) flush 20 mL  20 mL InterCATHeter Q24H    bacitracin 500 unit/gram packet 1 Packet  1 Packet Topical PRN    lactobac ac& pc-s.therm-b.anim (AJIT Q/RISAQUAD)  1 Cap Oral DAILY    senna (SENOKOT) tablet 8.6 mg  1 Tab Oral QHS    sodium chloride (NS) flush 5-10 mL  5-10 mL IntraVENous Q8H    sodium chloride (NS) flush 5-10 mL  5-10 mL IntraVENous PRN    oxyCODONE-acetaminophen (PERCOCET) 5-325 mg per tablet 2 Tab  2 Tab Oral Q4H PRN    fentaNYL citrate (PF) injection 50 mcg  50 mcg IntraVENous Q2H PRN    naloxone (NARCAN) injection 0.4 mg  0.4 mg IntraVENous PRN    docusate sodium (COLACE) capsule 100 mg  100 mg Oral BID    ketorolac (TORADOL) injection 15 mg  15 mg IntraVENous Q6H    Vancomycin random- 6/20; please draw at the beginning of dialysis   Other DIALYSIS ONCE    epoetin pia (EPOGEN;PROCRIT) injection 8,000 Units  8,000 Units SubCUTAneous DIALYSIS TUE, THU & SAT    vancomycin (VANCOCIN) 500 mg in 0.9% sodium chloride (MBP/ADV) 100 mL  500 mg IntraVENous DIALYSIS TUE, THU & SAT    albuterol-ipratropium (DUO-NEB) 2.5 MG-0.5 MG/3 ML  3 mL Nebulization Q4H PRN    piperacillin-tazobactam (ZOSYN) 3.375 g in 0.9% sodium chloride (MBP/ADV) 100 mL  3.375 g IntraVENous Q12H    Vancomycin Pharmacy Dosing   Other Rx Dosing/Monitoring    allopurinol (ZYLOPRIM) tablet 100 mg  100 mg Oral DAILY AFTER BREAKFAST    amitriptyline (ELAVIL) tablet 25 mg  25 mg Oral QHS PRN    amLODIPine (NORVASC) tablet 10 mg  10 mg Oral DAILY    cyanocobalamin (VITAMIN B12) tablet 2,500 mcg  2,500 mcg Oral DAILY    gemfibrozil (LOPID) tablet 300 mg  300 mg Oral DAILY    hydroxychloroquine (PLAQUENIL) tablet 200 mg  200 mg Oral BID    pantoprazole (PROTONIX) tablet 40 mg  40 mg Oral ACB    predniSONE (DELTASONE) tablet 5 mg  5 mg Oral DAILY    senna (SENOKOT) tablet 8.6 mg  1 Tab Oral DAILY PRN    ondansetron (ZOFRAN) injection 4 mg  4 mg IntraVENous Q4H PRN    arformoterol (BROVANA) neb solution 15 mcg  15 mcg Nebulization BID RT    And    budesonide (PULMICORT) 500 mcg/2 ml nebulizer suspension  500 mcg Nebulization BID RT    hydrALAZINE (APRESOLINE) 20 mg/mL injection 10 mg  10 mg IntraVENous Q6H PRN    carvedilol (COREG) tablet 25 mg  25 mg Oral BID WITH MEALS    losartan (COZAAR) tablet 50 mg  50 mg Oral DAILY    cloNIDine HCl (CATAPRES) tablet 0.1 mg  0.1 mg Oral BID

## 2018-06-23 NOTE — PROGRESS NOTES
[facility]  [address]  ____________________________________________________________      Admit Date: 2018    POD/HD 2 Days Post-Op    Procedure:  Procedure(s):  RIGHT VIDEO ASSITED THORASCOPY, DECORTICATION    Subjective:     Patient has no new complaints and has no complaints. Objective:     Blood pressure (!) 181/124, pulse 98, temperature 98.3 °F (36.8 °C), temperature source Oral, resp. rate 16, height 5' 5\" (1.651 m), weight 154 lb 15.7 oz (70.3 kg), SpO2 100 %. Temp (24hrs), Av.4 °F (36.9 °C), Min:98 °F (36.7 °C), Max:99.1 °F (37.3 °C)                  Physical Exam:  GENERAL: alert, cooperative, no distress, LUNG: clear to auscultation bilaterally, HEART: regular rate and rhythm, S1, S2 normal, no murmur, click, rub or gallop    Incision:clean, dry and intact    Chest Tube: no airleak to suction    Labs:   Recent Results (from the past 24 hour(s))   HEP B SURFACE AG    Collection Time: 18  3:50 AM   Result Value Ref Range    Hepatitis B surface Ag <0.10 Index    Hep B surface Ag Interp. NEGATIVE  NEG         Data Review images and reports reviewed postop changes    Assessment:     Principal Problem:    Sepsis ([address] (2018)    Active Problems:    Troponin level elevated (2018)        Plan/Recommendations/Medical Decision Making:     Keep chest tubes to suction for now    Thank you for allowing us to participate in the care of your patient.     Kacie García MD

## 2018-06-23 NOTE — PROGRESS NOTES
Bedside shift change report given to Abdi Kraus (oncoming nurse) by Rico Freeman (offgoing nurse). Report included the following information SBAR, Intake/Output, MAR, Recent Results and Cardiac Rhythm NSR.

## 2018-06-23 NOTE — PROGRESS NOTES
Hospitalist Progress Note  Mickie Gunderson MD  Answering service: [phone] from in house phone        Date of Service:  2018  NAME:  Richy Prieto  :  1985  MRN:  [id number]      Admission Summary: This is a 61-year-old woman with a past medical history significant for end-stage renal disease on hemodialysis, lupus, asthma, thromboembolism, dyslipidemia, was in her usual state of health until the day of presentation at the emergency room when the patient developed shortness of breath. The shortness of breath is progressive, it is with little or no activity. Patient had routine dialysis done. After the dialysis, that is when the patient developed the shortness of breath. Patient used to be on peritoneal dialysis, but was converted to hemodialysis because of recurrent infection. The patient was last admitted to this hospital from 2018 to 2018. She was admitted and treated for sepsis, attributed to multiple abdominal abscesses. Patient underwent drainage of the abdominal abscess. Patient also underwent drainage of a right pleural effusion. She was discharged home to complete a course of Augmentin. She stated that she has been having shortness of breath on and off since she was discharged from hospital.  A day before coming to the emergency room here, she was seen at another emergency room. Patient was evaluated, but was not admitted. When the patient arrived at the emergency room, she was found to have elevated lactic acid level, low grade temperature, tachycardia. Code sepsis was called. The chest x-ray shows evidence of pneumonia Similar presentation last admission as well    Interval history / Subjective:    s/p Right video-assisted thoracoscopy and Full pulmonary decortication of the right lower lobe. Pt doing well , no CP/SOB getting Dialyzed.  Cultures pending     Assessment & Plan:     Health care associated Pneumonia with partially loculated pleural effusion (?recurrent)  -CXR 6/17 Unchanged mild right pleural effusion with patchy opacification right base  -Vanc/Zosyn  -Follow cultures  -CT chest/abd 6/17 The pleural fluid on the right extends more superiorly in the right hemithorax and there is a more focal collection laterally measuring 5.4 cm may be partially loculated.  -Last admission patient had US guided removal with chest tube placed  -Consult thoracic surgery- s/p VATS on 6/21 CT tube in place draining to suctuion  - abx MGMT per ID    Abdominal cavity abscess (last admission)  -CT abd 6/17 Pigtail catheter overlies collection in the pelvis anteriorly which has diminished considerably in size since 5/16/2018 and there is a small amount of residual fluid noted    Suspected sepsis  -as above  -blood culture 6/16 alpha Strep in  1/2 bottles  -Repeat blood culture 6/18  Neg so far     ESRD  -on HD (TTS)  -Appreciate Nephrology    Anemia  -likely of chronic disease  -on EPO     Lupus  -Outpatient being followed by Dr Katrina Sosa  -hold imuran     Chronic DVT  -on Eliquis - on hold for surgery   -resume tomorrow if OK with surgery     Elevated troponin  -Likely from ESRD  - Cardiology consulted  -Echo EF 60-65% with no RWMA    Dyslipidemia  -continue home meds    Diet  Renal diet    Hypertension:  Told RN to give her BP meds now. Code status: Full  DVT prophylaxis: Eliquis  PTA: home    Plan: Continue antibiotics, follow cultures,    Care Plan discussed with: Patient/Family  Disposition: TBD      Hospital Problems  Date Reviewed: 6/21/2018          Codes Class Noted POA    Troponin level elevated ICD-10-CM: R74.8  ICD-9-CM: 790.6  6/17/2018 Unknown        * (Principal)Sepsis ([facility] [address] ICD-10-CM: A41.9  ICD-9-CM: 038.9, 995.91  4/29/2018 Yes                Review of Systems:   A comprehensive review of systems was negative except for that written in the HPI.        Vital Signs:    Last 24hrs VS reviewed since prior progress note. Most recent are:  Visit Vitals    BP (!) 183/132    Pulse 100    Temp 98.3 °F (36.8 °C) (Oral)    Resp 17    Ht 5' 5\" (1.651 m)    Wt 70.3 kg (154 lb 15.7 oz)    SpO2 100%    BMI 25.79 kg/m2         Intake/Output Summary (Last 24 hours) at 06/23/18 1030  Last data filed at 06/23/18 0357   Gross per 24 hour   Intake              220 ml   Output              250 ml   Net              -30 ml        Physical Examination:             Constitutional:  No acute distress, cooperative, pleasant    ENT:  Oral mucous moist, oropharynx benign. Neck supple,    Resp:  Rt sided crackles up to mid lung CT tube in place left lung CTA   CV:  Regular rhythm, normal rate, no murmurs, gallops, rubs    GI:  Soft, non distended, non tender. normoactive bowel sounds, no hepatosplenomegaly, drain in place    Musculoskeletal:  No edema, warm, 2+ pulses throughout,Right AV fistula in place    Neurologic:  Moves all extremities. AAOx3, CN II-XII reviewed     Skin:  Good turgor, no rashes or ulcers       Data Review:    Review and/or order of clinical lab test      Labs:     No results for input(s): WBC, HGB, HCT, PLT, HGBEXT, HCTEXT, PLTEXT, HGBEXT, HCTEXT, PLTEXT in the last 72 hours. Recent Labs      06/22/18   0419  06/21/18   0301   NA  139  140   K  3.9  4.6   CL  102  104   CO2  31  30   BUN  18  24*   CREA  3.31*  4.34*   GLU  91  100   CA  8.3*  8.3*     No results for input(s): SGOT, GPT, ALT, AP, TBIL, TBILI, TP, ALB, GLOB, GGT, AML, LPSE in the last 72 hours. No lab exists for component: AMYP, HLPSE  No results for input(s): INR, PTP, APTT in the last 72 hours. No lab exists for component: INREXT, INREXT   No results for input(s): FE, TIBC, PSAT, FERR in the last 72 hours. Lab Results   Component Value Date/Time    Folate 13.7 06/17/2018 03:00 PM      No results for input(s): PH, PCO2, PO2 in the last 72 hours. No results for input(s): CPK, CKNDX, TROIQ in the last 72 hours.     No lab exists for component: CPKMB  Lab Results   Component Value Date/Time    Cholesterol, total 117 09/25/2015 02:02 PM    HDL Cholesterol 31 (L) 09/25/2015 02:02 PM    LDL, calculated 57 09/25/2015 02:02 PM    Triglyceride 146 09/25/2015 02:02 PM    CHOL/HDL Ratio 7.7 (H) 05/31/2009 10:30 AM     Lab Results   Component Value Date/Time    Glucose (POC) 123 (H) 04/20/2018 05:33 PM    Glucose (POC) 95 04/20/2018 11:19 AM    Glucose (POC) 100 04/20/2018 07:18 AM    Glucose (POC) 119 (H) 04/19/2018 09:11 PM    Glucose (POC) 86 04/19/2018 04:30 PM     Lab Results   Component Value Date/Time    Color YELLOW/STRAW 08/12/2016 09:06 PM    Appearance CLEAR 08/12/2016 09:06 PM    Specific gravity 1.017 08/12/2016 09:06 PM    Specific gravity 1.010 07/11/2016 12:44 PM    pH (UA) 7.0 08/12/2016 09:06 PM    Protein 300 (A) 08/12/2016 09:06 PM    Glucose NEGATIVE  08/12/2016 09:06 PM    Ketone TRACE (A) 08/12/2016 09:06 PM    Bilirubin NEGATIVE  07/11/2016 12:44 PM    Urobilinogen 1.0 08/12/2016 09:06 PM    Nitrites NEGATIVE  08/12/2016 09:06 PM    Leukocyte Esterase NEGATIVE  08/12/2016 09:06 PM    Epithelial cells MODERATE (A) 08/12/2016 09:06 PM    Bacteria 1+ (A) 08/12/2016 09:06 PM    WBC 0-4 08/12/2016 09:06 PM    RBC 0-5 08/12/2016 09:06 PM         Medications Reviewed:     Current Facility-Administered Medications   Medication Dose Route Frequency    alteplase (CATHFLO) 1 mg in sterile water (preservative free) 1 mL injection  1 mg InterCATHeter PRN    sodium chloride (NS) flush 10-30 mL  10-30 mL InterCATHeter PRN    sodium chloride (NS) flush 10 mL  10 mL InterCATHeter Q24H    sodium chloride (NS) flush 10 mL  10 mL InterCATHeter PRN    sodium chloride (NS) flush 10-40 mL  10-40 mL InterCATHeter Q8H    sodium chloride (NS) flush 20 mL  20 mL InterCATHeter Q24H    bacitracin 500 unit/gram packet 1 Packet  1 Packet Topical PRN    lactobac ac& pc-s.therm-b.anim (AJIT Q/RISAQUAD)  1 Cap Oral DAILY    senna (SENOKOT) tablet 8.6 mg  1 Tab Oral QHS    sodium chloride (NS) flush 5-10 mL  5-10 mL IntraVENous Q8H    sodium chloride (NS) flush 5-10 mL  5-10 mL IntraVENous PRN    oxyCODONE-acetaminophen (PERCOCET) 5-325 mg per tablet 2 Tab  2 Tab Oral Q4H PRN    fentaNYL citrate (PF) injection 50 mcg  50 mcg IntraVENous Q2H PRN    naloxone (NARCAN) injection 0.4 mg  0.4 mg IntraVENous PRN    docusate sodium (COLACE) capsule 100 mg  100 mg Oral BID    ketorolac (TORADOL) injection 15 mg  15 mg IntraVENous Q6H    Vancomycin random- 6/20; please draw at the beginning of dialysis   Other DIALYSIS ONCE    epoetin pia (EPOGEN;PROCRIT) injection 8,000 Units  8,000 Units SubCUTAneous DIALYSIS TUE, THU & SAT    vancomycin (VANCOCIN) 500 mg in 0.9% sodium chloride (MBP/ADV) 100 mL  500 mg IntraVENous DIALYSIS TUE, THU & SAT    albuterol-ipratropium (DUO-NEB) 2.5 MG-0.5 MG/3 ML  3 mL Nebulization Q4H PRN    piperacillin-tazobactam (ZOSYN) 3.375 g in 0.9% sodium chloride (MBP/ADV) 100 mL  3.375 g IntraVENous Q12H    Vancomycin Pharmacy Dosing   Other Rx Dosing/Monitoring    allopurinol (ZYLOPRIM) tablet 100 mg  100 mg Oral DAILY AFTER BREAKFAST    amitriptyline (ELAVIL) tablet 25 mg  25 mg Oral QHS PRN    amLODIPine (NORVASC) tablet 10 mg  10 mg Oral DAILY    cyanocobalamin (VITAMIN B12) tablet 2,500 mcg  2,500 mcg Oral DAILY    gemfibrozil (LOPID) tablet 300 mg  300 mg Oral DAILY    hydroxychloroquine (PLAQUENIL) tablet 200 mg  200 mg Oral BID    pantoprazole (PROTONIX) tablet 40 mg  40 mg Oral ACB    predniSONE (DELTASONE) tablet 5 mg  5 mg Oral DAILY    senna (SENOKOT) tablet 8.6 mg  1 Tab Oral DAILY PRN    ondansetron (ZOFRAN) injection 4 mg  4 mg IntraVENous Q4H PRN    arformoterol (BROVANA) neb solution 15 mcg  15 mcg Nebulization BID RT    And    budesonide (PULMICORT) 500 mcg/2 ml nebulizer suspension  500 mcg Nebulization BID RT    hydrALAZINE (APRESOLINE) 20 mg/mL injection 10 mg  10 mg IntraVENous Q6H PRN    carvedilol (COREG) tablet 25 mg  25 mg Oral BID WITH MEALS    losartan (COZAAR) tablet 50 mg  50 mg Oral DAILY    cloNIDine HCl (CATAPRES) tablet 0.1 mg  0.1 mg Oral BID     ______________________________________________________________________  EXPECTED LENGTH OF STAY: 10d 7h  ACTUAL LENGTH OF STAY:          [address], MD

## 2018-06-23 NOTE — PROGRESS NOTES
Paged Mariah Almendarez with change in patient condition larger right breast than left. crepitus right post flank. Instruct to call thoracic surg    Dr Nakita Charles paged.   [phone] returned call orders recieved

## 2018-06-23 NOTE — PROGRESS NOTES
Physical Therapy:  Orders received and chart reviewed. Spoke to RN who reports patient is currently undergoing dialysis. Will f/u as able to complete PT evaluation.   Alfred Holt, PT

## 2018-06-23 NOTE — PROGRESS NOTES
Problem: Pressure Injury - Risk of  Goal: *Prevention of pressure injury  Document Vikash Scale and appropriate interventions in the flowsheet. Outcome: Progressing Towards Goal  Pressure Injury Interventions:             Activity Interventions: Pressure redistribution bed/mattress(bed type)    Mobility Interventions: Pressure redistribution bed/mattress (bed type)    Nutrition Interventions: Document food/fluid/supplement intake

## 2018-06-23 NOTE — PROGRESS NOTES
Left IJ catheter overlies left brachiocephalic vein as noted on radiology on 6/22/2018.  Message called to Harika Granger

## 2018-06-23 NOTE — PROCEDURES
[facility] Team [facility]  [phone]    Vitals   Pre   Post   Assessment   Pre   Post     Temp  Temp: 98.3 °F (36.8 °C) (06/23/18 0745)  98.1 LOC  Alert and oriented x4 Alert and oriented x4   HR   Pulse (Heart Rate): 89 (06/23/18 0745) 97 Lungs   CTA  CTA   B/P   BP: (!) 151/104 (06/23/18 0745) 178/120 Cardiac   Reg s1, s2  Reg s1, s2   Resp   Resp Rate: 20 (06/23/18 0745) 16 Skin   Warm chest tube in place  Warm chest tube in place   Pain level  Pain Intensity 1: 7 (06/23/18 0610) 5 Edema      none none   Orders:    Duration:   Start:    0815 End:    1145 Total:   3.5   Dialyzer:   Dialyzer/Set Up Inspection: Revaclear (06/23/18 0745)   K Bath:   Dialysate K (mEq/L): 2 (06/23/18 0745)   Ca Bath:   Dialysate CA (mEq/L): 2.5 (06/23/18 0745)   Na/Bicarb:   Dialysate NA (mEq/L): 140 (06/23/18 0745)   Target Fluid Removal:   Goal/Amount of Fluid to Remove (mL): 2500 mL (06/23/18 0745)   Access     Type & Location:   [facility]   Labs     Obtained/Reviewed   Critical Results Called   Date when labs were drawn-  Hgb-    HGB   Date Value Ref Range Status   06/19/2018 8.3 (L) 11.5 - 16.0 g/dL Final     K-    Potassium   Date Value Ref Range Status   06/22/2018 3.9 3.5 - 5.1 mmol/L Final     Ca-   Calcium   Date Value Ref Range Status   06/22/2018 8.3 (L) 8.5 - 10.1 MG/DL Final     Bun-   BUN   Date Value Ref Range Status   06/22/2018 18 6 - 20 MG/DL Final     Creat-   Creatinine   Date Value Ref Range Status   06/22/2018 3.31 (H) 0.55 - 1.02 MG/DL Final     Comment:     INVESTIGATED PER DELTA CHECK PROTOCOL        Medications/ Blood Products Given     Name   Dose   Route and Time                     Blood Volume Processed (BVP):    48.6 Net Fluid   Removed:  2000ml   Comments   Time Out Done: 603  Primary Nurse Rpt Pre:Nguyen Apple RN  Primary Nurse Rpt Daryl Osorio RN  Pt Education: Procedural, Access  Care Plan:ongoing  Tx Summary:  0745:  Access cleaned and prep.  Arterial needle cannulated with ease. Venous needle cannulated with difficulty. Venous cannulated x2.  1st needle removed clots from fistula needle and blood in syringe totally clotted. Access bleed 35 minutes prior to recannulating. 0815: Venous Recannulated with ease some resistance noted. Treatment started Dr. Angy Khan notified of access issue and venous pressure. 0830: Pt resting access secure and intact Dr. Candi Montana at Bedside will continue HD with present settings  0845: Pt resting access secure and intact patient requesting pain meds. 0900: Pt resting access secure and intact  0915: Pt resting access secure and intact  0930: Pt resting access secure and intact  0945: Pt resting access secure and intact  1000: Pt resting access secure and intact  1015: B/P elevated notified Primary nurse requested b/p medicine to be given, patient denies headache Pt resting access secure and intact  1030: Pt resting access secure and intact  1045: Nurse wwith b/p medcine Access and lines secure and intact  1100: Access and lines secure and intact Pt resting  1115: Access and lines secure and intact Pt resting  1130:  Patient alert talking requesting more pain medicine Access and lines secure and intact. 1145:  Treatment ended. Blood returned sites. Held sites 15 minx2. No bleeding noted upon leaving. Report given to primary RN  Admiting Diagnosis: Pneumonia, SOB  Pt's previous clinic-  Consent signed - Informed Consent Verified: Yes (06/23/18 0745)  [facility] Consent - yes on file  Hepatitis Status- HBSAG negative 6/23/18  Machine #- Machine Number: [id number] (06/23/18 0745)  Telemetry status-  Pre-dialysis wt. - Pre-Dialysis Weight: 70.4 kg (155 lb 3.3 oz) (06/23/18 0745)

## 2018-06-23 NOTE — ROUTINE PROCESS
Bedside shift change report given to Franchesca Hinkle RN (oncoming nurse) by Elisabet Dyer RN (offgoing nurse). Report included the following information SBAR, Procedure Summary and Recent Results.

## 2018-06-24 ENCOUNTER — APPOINTMENT (OUTPATIENT)
Dept: GENERAL RADIOLOGY | Age: 33
DRG: 853 | End: 2018-06-24
Attending: THORACIC SURGERY (CARDIOTHORACIC VASCULAR SURGERY)
Payer: MEDICARE

## 2018-06-24 LAB
ALBUMIN SERPL-MCNC: 1.7 G/DL (ref 3.5–5)
ALBUMIN/GLOB SERPL: 0.4 {RATIO} (ref 1.1–2.2)
ALP SERPL-CCNC: 55 U/L (ref 45–117)
ALT SERPL-CCNC: 7 U/L (ref 12–78)
ANION GAP SERPL CALC-SCNC: 6 MMOL/L (ref 5–15)
AST SERPL-CCNC: 13 U/L (ref 15–37)
BILIRUB SERPL-MCNC: 0.3 MG/DL (ref 0.2–1)
BUN SERPL-MCNC: 27 MG/DL (ref 6–20)
BUN/CREAT SERPL: 7 (ref 12–20)
CALCIUM SERPL-MCNC: 8.2 MG/DL (ref 8.5–10.1)
CHLORIDE SERPL-SCNC: 105 MMOL/L (ref 97–108)
CO2 SERPL-SCNC: 29 MMOL/L (ref 21–32)
CREAT SERPL-MCNC: 3.91 MG/DL (ref 0.55–1.02)
ERYTHROCYTE [DISTWIDTH] IN BLOOD BY AUTOMATED COUNT: 19 % (ref 11.5–14.5)
GLOBULIN SER CALC-MCNC: 4.2 G/DL (ref 2–4)
GLUCOSE SERPL-MCNC: 85 MG/DL (ref 65–100)
HBV SURFACE AG SER QL: <0.1 INDEX
HBV SURFACE AG SER QL: NEGATIVE
HCT VFR BLD AUTO: 24.4 % (ref 35–47)
HGB BLD-MCNC: 7.1 G/DL (ref 11.5–16)
MAGNESIUM SERPL-MCNC: 2 MG/DL (ref 1.6–2.4)
MCH RBC QN AUTO: 26.4 PG (ref 26–34)
MCHC RBC AUTO-ENTMCNC: 29.1 G/DL (ref 30–36.5)
MCV RBC AUTO: 90.7 FL (ref 80–99)
NRBC # BLD: 0 K/UL (ref 0–0.01)
NRBC BLD-RTO: 0 PER 100 WBC
PHOSPHATE SERPL-MCNC: 3.2 MG/DL (ref 2.6–4.7)
PLATELET # BLD AUTO: 161 K/UL (ref 150–400)
PMV BLD AUTO: 10.1 FL (ref 8.9–12.9)
POTASSIUM SERPL-SCNC: 4.6 MMOL/L (ref 3.5–5.1)
PROT SERPL-MCNC: 5.9 G/DL (ref 6.4–8.2)
RBC # BLD AUTO: 2.69 M/UL (ref 3.8–5.2)
SODIUM SERPL-SCNC: 140 MMOL/L (ref 136–145)
WBC # BLD AUTO: 5.8 K/UL (ref 3.6–11)

## 2018-06-24 PROCEDURE — G8978 MOBILITY CURRENT STATUS: HCPCS | Performed by: PHYSICAL THERAPIST

## 2018-06-24 PROCEDURE — 74011000250 HC RX REV CODE- 250: Performed by: INTERNAL MEDICINE

## 2018-06-24 PROCEDURE — 74011250636 HC RX REV CODE- 250/636: Performed by: THORACIC SURGERY (CARDIOTHORACIC VASCULAR SURGERY)

## 2018-06-24 PROCEDURE — 74011636637 HC RX REV CODE- 636/637: Performed by: INTERNAL MEDICINE

## 2018-06-24 PROCEDURE — G8979 MOBILITY GOAL STATUS: HCPCS | Performed by: PHYSICAL THERAPIST

## 2018-06-24 PROCEDURE — 97116 GAIT TRAINING THERAPY: CPT | Performed by: PHYSICAL THERAPIST

## 2018-06-24 PROCEDURE — 74011250637 HC RX REV CODE- 250/637: Performed by: THORACIC SURGERY (CARDIOTHORACIC VASCULAR SURGERY)

## 2018-06-24 PROCEDURE — 71045 X-RAY EXAM CHEST 1 VIEW: CPT

## 2018-06-24 PROCEDURE — 94640 AIRWAY INHALATION TREATMENT: CPT

## 2018-06-24 PROCEDURE — 36591 DRAW BLOOD OFF VENOUS DEVICE: CPT

## 2018-06-24 PROCEDURE — 74011250636 HC RX REV CODE- 250/636: Performed by: INTERNAL MEDICINE

## 2018-06-24 PROCEDURE — 74011250637 HC RX REV CODE- 250/637: Performed by: SPECIALIST

## 2018-06-24 PROCEDURE — 84100 ASSAY OF PHOSPHORUS: CPT | Performed by: INTERNAL MEDICINE

## 2018-06-24 PROCEDURE — 74011250636 HC RX REV CODE- 250/636: Performed by: NURSE PRACTITIONER

## 2018-06-24 PROCEDURE — 97161 PT EVAL LOW COMPLEX 20 MIN: CPT | Performed by: PHYSICAL THERAPIST

## 2018-06-24 PROCEDURE — 74011000258 HC RX REV CODE- 258: Performed by: INTERNAL MEDICINE

## 2018-06-24 PROCEDURE — 74011250637 HC RX REV CODE- 250/637: Performed by: INTERNAL MEDICINE

## 2018-06-24 PROCEDURE — 80053 COMPREHEN METABOLIC PANEL: CPT | Performed by: INTERNAL MEDICINE

## 2018-06-24 PROCEDURE — 83735 ASSAY OF MAGNESIUM: CPT | Performed by: INTERNAL MEDICINE

## 2018-06-24 PROCEDURE — 74011250637 HC RX REV CODE- 250/637: Performed by: HOSPITALIST

## 2018-06-24 PROCEDURE — 85027 COMPLETE CBC AUTOMATED: CPT | Performed by: INTERNAL MEDICINE

## 2018-06-24 PROCEDURE — G8980 MOBILITY D/C STATUS: HCPCS | Performed by: PHYSICAL THERAPIST

## 2018-06-24 PROCEDURE — 87340 HEPATITIS B SURFACE AG IA: CPT | Performed by: INTERNAL MEDICINE

## 2018-06-24 PROCEDURE — 65660000000 HC RM CCU STEPDOWN

## 2018-06-24 PROCEDURE — 36415 COLL VENOUS BLD VENIPUNCTURE: CPT | Performed by: INTERNAL MEDICINE

## 2018-06-24 RX ADMIN — Medication 10 ML: at 07:31

## 2018-06-24 RX ADMIN — AMITRIPTYLINE HYDROCHLORIDE 25 MG: 50 TABLET, FILM COATED ORAL at 21:00

## 2018-06-24 RX ADMIN — HYDROXYCHLOROQUINE SULFATE 200 MG: 200 TABLET, FILM COATED ORAL at 08:50

## 2018-06-24 RX ADMIN — DOCUSATE SODIUM 100 MG: 100 CAPSULE, LIQUID FILLED ORAL at 08:49

## 2018-06-24 RX ADMIN — Medication 10 ML: at 11:13

## 2018-06-24 RX ADMIN — SENNOSIDES 8.6 MG: 8.6 TABLET, FILM COATED ORAL at 21:00

## 2018-06-24 RX ADMIN — Medication 30 ML: at 13:58

## 2018-06-24 RX ADMIN — AMLODIPINE BESYLATE 10 MG: 5 TABLET ORAL at 08:49

## 2018-06-24 RX ADMIN — FENTANYL CITRATE 50 MCG: 50 INJECTION, SOLUTION INTRAMUSCULAR; INTRAVENOUS at 09:39

## 2018-06-24 RX ADMIN — LOSARTAN POTASSIUM 50 MG: 50 TABLET ORAL at 08:49

## 2018-06-24 RX ADMIN — KETOROLAC TROMETHAMINE 15 MG: 30 INJECTION, SOLUTION INTRAMUSCULAR at 11:13

## 2018-06-24 RX ADMIN — KETOROLAC TROMETHAMINE 15 MG: 30 INJECTION, SOLUTION INTRAMUSCULAR at 05:11

## 2018-06-24 RX ADMIN — ONDANSETRON 4 MG: 2 INJECTION INTRAMUSCULAR; INTRAVENOUS at 22:16

## 2018-06-24 RX ADMIN — Medication 2500 MCG: at 08:50

## 2018-06-24 RX ADMIN — Medication 10 ML: at 05:18

## 2018-06-24 RX ADMIN — FENTANYL CITRATE 50 MCG: 50 INJECTION, SOLUTION INTRAMUSCULAR; INTRAVENOUS at 07:31

## 2018-06-24 RX ADMIN — FENTANYL CITRATE 50 MCG: 50 INJECTION, SOLUTION INTRAMUSCULAR; INTRAVENOUS at 03:51

## 2018-06-24 RX ADMIN — PANTOPRAZOLE SODIUM 40 MG: 40 TABLET, DELAYED RELEASE ORAL at 07:33

## 2018-06-24 RX ADMIN — Medication 1 CAPSULE: at 08:50

## 2018-06-24 RX ADMIN — HYDROXYCHLOROQUINE SULFATE 200 MG: 200 TABLET, FILM COATED ORAL at 17:20

## 2018-06-24 RX ADMIN — Medication 10 ML: at 18:26

## 2018-06-24 RX ADMIN — ARFORMOTEROL TARTRATE 15 MCG: 15 SOLUTION RESPIRATORY (INHALATION) at 08:17

## 2018-06-24 RX ADMIN — CLONIDINE HYDROCHLORIDE 0.1 MG: 0.1 TABLET ORAL at 17:20

## 2018-06-24 RX ADMIN — Medication 10 ML: at 09:40

## 2018-06-24 RX ADMIN — BUDESONIDE 500 MCG: 0.5 INHALANT RESPIRATORY (INHALATION) at 20:52

## 2018-06-24 RX ADMIN — Medication 10 ML: at 05:11

## 2018-06-24 RX ADMIN — FENTANYL CITRATE 50 MCG: 50 INJECTION, SOLUTION INTRAMUSCULAR; INTRAVENOUS at 15:59

## 2018-06-24 RX ADMIN — FENTANYL CITRATE 50 MCG: 50 INJECTION, SOLUTION INTRAMUSCULAR; INTRAVENOUS at 22:51

## 2018-06-24 RX ADMIN — CARVEDILOL 25 MG: 12.5 TABLET, FILM COATED ORAL at 08:50

## 2018-06-24 RX ADMIN — PIPERACILLIN SODIUM,TAZOBACTAM SODIUM 3.38 G: 3; .375 INJECTION, POWDER, FOR SOLUTION INTRAVENOUS at 22:55

## 2018-06-24 RX ADMIN — Medication 20 ML: at 07:00

## 2018-06-24 RX ADMIN — Medication 10 ML: at 15:59

## 2018-06-24 RX ADMIN — FENTANYL CITRATE 50 MCG: 50 INJECTION, SOLUTION INTRAMUSCULAR; INTRAVENOUS at 20:44

## 2018-06-24 RX ADMIN — CLONIDINE HYDROCHLORIDE 0.1 MG: 0.1 TABLET ORAL at 08:50

## 2018-06-24 RX ADMIN — PIPERACILLIN SODIUM,TAZOBACTAM SODIUM 3.38 G: 3; .375 INJECTION, POWDER, FOR SOLUTION INTRAVENOUS at 11:13

## 2018-06-24 RX ADMIN — FENTANYL CITRATE 50 MCG: 50 INJECTION, SOLUTION INTRAMUSCULAR; INTRAVENOUS at 11:47

## 2018-06-24 RX ADMIN — APIXABAN 2.5 MG: 2.5 TABLET, FILM COATED ORAL at 17:20

## 2018-06-24 RX ADMIN — ALLOPURINOL 100 MG: 100 TABLET ORAL at 08:49

## 2018-06-24 RX ADMIN — FENTANYL CITRATE 50 MCG: 50 INJECTION, SOLUTION INTRAMUSCULAR; INTRAVENOUS at 02:02

## 2018-06-24 RX ADMIN — GEMFIBROZIL 300 MG: 600 TABLET ORAL at 08:51

## 2018-06-24 RX ADMIN — CARVEDILOL 25 MG: 12.5 TABLET, FILM COATED ORAL at 17:20

## 2018-06-24 RX ADMIN — FENTANYL CITRATE 50 MCG: 50 INJECTION, SOLUTION INTRAMUSCULAR; INTRAVENOUS at 13:57

## 2018-06-24 RX ADMIN — PREDNISONE 5 MG: 5 TABLET ORAL at 08:50

## 2018-06-24 RX ADMIN — ARFORMOTEROL TARTRATE 15 MCG: 15 SOLUTION RESPIRATORY (INHALATION) at 20:52

## 2018-06-24 RX ADMIN — APIXABAN 2.5 MG: 2.5 TABLET, FILM COATED ORAL at 08:50

## 2018-06-24 RX ADMIN — BUDESONIDE 500 MCG: 0.5 INHALANT RESPIRATORY (INHALATION) at 08:17

## 2018-06-24 RX ADMIN — Medication 10 ML: at 22:20

## 2018-06-24 RX ADMIN — DOCUSATE SODIUM 100 MG: 100 CAPSULE, LIQUID FILLED ORAL at 17:20

## 2018-06-24 RX ADMIN — FENTANYL CITRATE 50 MCG: 50 INJECTION, SOLUTION INTRAMUSCULAR; INTRAVENOUS at 05:11

## 2018-06-24 RX ADMIN — FENTANYL CITRATE 50 MCG: 50 INJECTION, SOLUTION INTRAMUSCULAR; INTRAVENOUS at 18:26

## 2018-06-24 NOTE — PROGRESS NOTES
Problem: Falls - Risk of  Goal: *Absence of Falls  Document Alex Fall Risk and appropriate interventions in the flowsheet.    Outcome: Progressing Towards Goal  Fall Risk Interventions:            Medication Interventions: Patient to call before getting OOB, Teach patient to arise slowly    Elimination Interventions: Call light in reach    History of Falls Interventions: Door open when patient unattended

## 2018-06-24 NOTE — PROGRESS NOTES
NAME: Delmis Enciso        :  1985        MRN:  [id number]        Assessment :    Plan:  - End-stage renal disease - TTS - [facility]     SLE    Anemia    PNA/Right paraneumonic loculated effusion-> s/p VATS      --Next HD 18     Continue EPO. May need PRBCS with next HD    [facility] [place]    May need vascular surgery to evaluate access next week             Subjective:     Chief Complaint: R arm swollen-> likely AVF infiltration. Review of Systems:    Symptom Y/N Comments  Symptom Y/N Comments   Fever/Chills    Chest Pain     Poor Appetite    Edema     Cough    Abdominal Pain     Sputum    Joint Pain     SOB/IVY    Pruritis/Rash     Nausea/vomit    Tolerating PT/OT     Diarrhea    Tolerating Diet     Constipation    Other       Could not obtain due to:      Objective:     VITALS:   Last 24hrs VS reviewed since prior progress note.  Most recent are:  Visit Vitals    BP (!) 140/96 (BP 1 Location: Left arm, BP Patient Position: Sitting)    Pulse 87    Temp 98.3 °F (36.8 °C)    Resp 17    Ht 5' 5\" (1.651 m)    Wt 69.1 kg (152 lb 5.4 oz)    SpO2 97%    BMI 25.35 kg/m2       Intake/Output Summary (Last 24 hours) at 18 0949  Last data filed at 18 4014   Gross per 24 hour   Intake             1250 ml   Output              185 ml   Net             1065 ml      Telemetry Reviewed:     PHYSICAL EXAM:  General: NAD  CTA  Right chest tube  No edema      Lab Data Reviewed: (see below)    Medications Reviewed: (see below)    PMH/SH reviewed - no change compared to H&P  ________________________________________________________________________  Care Plan discussed with:  Patient Y    Family      RN     Care Manager                    Consultant:          Comments   >50% of visit spent in counseling and coordination of care       ________________________________________________________________________  Julito Bruno MD TIGRE     Procedures: see electronic medical records for all procedures/Xrays and details which  were not copied into this note but were reviewed prior to creation of Plan. LABS:  Recent Labs      06/24/18   0346   WBC  5.8   HGB  7.1*   HCT  24.4*   PLT  161     Recent Labs      06/24/18   0346  06/22/18   0419   NA  140  139   K  4.6  3.9   CL  105  102   CO2  29  31   BUN  27*  18   CREA  3.91*  3.31*   GLU  85  91   CA  8.2*  8.3*   MG  2.0   --    PHOS  3.2   --      Recent Labs      06/24/18   0346   SGOT  13*   AP  55   TP  5.9*   ALB  1.7*   GLOB  4.2*     No results for input(s): INR, PTP, APTT in the last 72 hours. No lab exists for component: INREXT, INREXT   No results for input(s): FE, TIBC, PSAT, FERR in the last 72 hours. Lab Results   Component Value Date/Time    Folate 13.7 06/17/2018 03:00 PM      No results for input(s): PH, PCO2, PO2 in the last 72 hours. No results for input(s): CPK, CKMB in the last 72 hours.     No lab exists for component: TROPONINI  No components found for: [place]  Lab Results   Component Value Date/Time    Color YELLOW/STRAW 08/12/2016 09:06 PM    Appearance CLEAR 08/12/2016 09:06 PM    Specific gravity 1.017 08/12/2016 09:06 PM    Specific gravity 1.010 07/11/2016 12:44 PM    pH (UA) 7.0 08/12/2016 09:06 PM    Protein 300 (A) 08/12/2016 09:06 PM    Glucose NEGATIVE  08/12/2016 09:06 PM    Ketone TRACE (A) 08/12/2016 09:06 PM    Bilirubin NEGATIVE  07/11/2016 12:44 PM    Urobilinogen 1.0 08/12/2016 09:06 PM    Nitrites NEGATIVE  08/12/2016 09:06 PM    Leukocyte Esterase NEGATIVE  08/12/2016 09:06 PM    Epithelial cells MODERATE (A) 08/12/2016 09:06 PM    Bacteria 1+ (A) 08/12/2016 09:06 PM    WBC 0-4 08/12/2016 09:06 PM    RBC 0-5 08/12/2016 09:06 PM       MEDICATIONS:  Current Facility-Administered Medications   Medication Dose Route Frequency    apixaban (ELIQUIS) tablet 2.5 mg  2.5 mg Oral BID    alteplase (CATHFLO) 1 mg in sterile water (preservative free) 1 mL injection  1 mg InterCATHeter PRN    sodium chloride (NS) flush 10-30 mL  10-30 mL InterCATHeter PRN    sodium chloride (NS) flush 10 mL  10 mL InterCATHeter Q24H    sodium chloride (NS) flush 10 mL  10 mL InterCATHeter PRN    sodium chloride (NS) flush 10-40 mL  10-40 mL InterCATHeter Q8H    sodium chloride (NS) flush 20 mL  20 mL InterCATHeter Q24H    bacitracin 500 unit/gram packet 1 Packet  1 Packet Topical PRN    lactobac ac& pc-s.therm-b.anim (AJIT Q/RISAQUAD)  1 Cap Oral DAILY    senna (SENOKOT) tablet 8.6 mg  1 Tab Oral QHS    sodium chloride (NS) flush 5-10 mL  5-10 mL IntraVENous Q8H    sodium chloride (NS) flush 5-10 mL  5-10 mL IntraVENous PRN    oxyCODONE-acetaminophen (PERCOCET) 5-325 mg per tablet 2 Tab  2 Tab Oral Q4H PRN    fentaNYL citrate (PF) injection 50 mcg  50 mcg IntraVENous Q2H PRN    naloxone (NARCAN) injection 0.4 mg  0.4 mg IntraVENous PRN    docusate sodium (COLACE) capsule 100 mg  100 mg Oral BID    ketorolac (TORADOL) injection 15 mg  15 mg IntraVENous Q6H    Vancomycin random- 6/20; please draw at the beginning of dialysis   Other DIALYSIS ONCE    epoetin pia (EPOGEN;PROCRIT) injection 8,000 Units  8,000 Units SubCUTAneous DIALYSIS TUE, THU & SAT    vancomycin (VANCOCIN) 500 mg in 0.9% sodium chloride (MBP/ADV) 100 mL  500 mg IntraVENous DIALYSIS TUE, THU & SAT    albuterol-ipratropium (DUO-NEB) 2.5 MG-0.5 MG/3 ML  3 mL Nebulization Q4H PRN    piperacillin-tazobactam (ZOSYN) 3.375 g in 0.9% sodium chloride (MBP/ADV) 100 mL  3.375 g IntraVENous Q12H    Vancomycin Pharmacy Dosing   Other Rx Dosing/Monitoring    allopurinol (ZYLOPRIM) tablet 100 mg  100 mg Oral DAILY AFTER BREAKFAST    amitriptyline (ELAVIL) tablet 25 mg  25 mg Oral QHS PRN    amLODIPine (NORVASC) tablet 10 mg  10 mg Oral DAILY    cyanocobalamin (VITAMIN B12) tablet 2,500 mcg  2,500 mcg Oral DAILY    gemfibrozil (LOPID) tablet 300 mg  300 mg Oral DAILY    hydroxychloroquine (PLAQUENIL) tablet 200 mg  200 mg Oral BID    pantoprazole (PROTONIX) tablet 40 mg  40 mg Oral ACB    predniSONE (DELTASONE) tablet 5 mg  5 mg Oral DAILY    senna (SENOKOT) tablet 8.6 mg  1 Tab Oral DAILY PRN    ondansetron (ZOFRAN) injection 4 mg  4 mg IntraVENous Q4H PRN    arformoterol (BROVANA) neb solution 15 mcg  15 mcg Nebulization BID RT    And    budesonide (PULMICORT) 500 mcg/2 ml nebulizer suspension  500 mcg Nebulization BID RT    hydrALAZINE (APRESOLINE) 20 mg/mL injection 10 mg  10 mg IntraVENous Q6H PRN    carvedilol (COREG) tablet 25 mg  25 mg Oral BID WITH MEALS    losartan (COZAAR) tablet 50 mg  50 mg Oral DAILY    cloNIDine HCl (CATAPRES) tablet 0.1 mg  0.1 mg Oral BID

## 2018-06-24 NOTE — ROUTINE PROCESS
Bedside shift change report given to Nestor Shay RN (oncoming nurse) by Brett Sanchez RN (offgoing nurse). Report included the following information SBAR, Procedure Summary and Recent Results.

## 2018-06-24 NOTE — PROGRESS NOTES
[facility]  [address]  ____________________________________________________________      Admit Date: 2018    POD/HD 3 Days Post-Op    Procedure:  Procedure(s):  RIGHT VIDEO ASSITED THORASCOPY, DECORTICATION    Subjective:     Patient has no new complaints. Objective:     Blood pressure 126/78, pulse 86, temperature 98 °F (36.7 °C), resp. rate 18, height 5' 5\" (1.651 m), weight 152 lb 5.4 oz (69.1 kg), SpO2 96 %.     Temp (24hrs), Av.1 °F (36.7 °C), Min:98 °F (36.7 °C), Max:98.4 °F (36.9 °C)        Date 18 07 - 18 0659   Shift 6217-1591 7077-4019 6841-1917 24 Hour Total   I  N  T  A  K  E   Shift Total  (mL/kg)       O  U  T  P  U  T   Chest Tube 0   0    Shift Total  (mL/kg) 0  (0)   0  (0)   Weight (kg) 69.1 69.1 69.1 69.1         Date 18 07 - 18 0659   Shift 9929-2511 4022-5089 9771-2439 24 Hour Total   I  N  T  A  K  E   Shift Total  (mL/kg)       O  U  T  P  U  T   Chest Tube 0   0    Shift Total  (mL/kg) 0  (0)   0  (0)   Weight (kg) 69.1 69.1 69.1 69.1         Physical Exam:  GENERAL: alert, cooperative, no distress, LUNG: clear to auscultation bilaterally, HEART: regular rate and rhythm, S1, S2 normal, no murmur, click, rub or gallop    Incision:clean, dry and intact    Chest Tube: small airleak     Labs:   Recent Results (from the past 24 hour(s))   CBC W/O DIFF    Collection Time: 18  3:46 AM   Result Value Ref Range    WBC 5.8 3.6 - 11.0 K/uL    RBC 2.69 (L) 3.80 - 5.20 M/uL    HGB 7.1 (L) 11.5 - 16.0 g/dL    HCT 24.4 (L) 35.0 - 47.0 %    MCV 90.7 80.0 - 99.0 FL    MCH 26.4 26.0 - 34.0 PG    MCHC 29.1 (L) 30.0 - 36.5 g/dL    RDW 19.0 (H) 11.5 - 14.5 %    PLATELET 317 472 - 970 K/uL    MPV 10.1 8.9 - 12.9 FL    NRBC 0.0 0  WBC    ABSOLUTE NRBC 0.00 0.00 - 0.01 K/uL   MAGNESIUM    Collection Time: 18  3:46 AM   Result Value Ref Range    Magnesium 2.0 1.6 - 2.4 mg/dL   PHOSPHORUS    Collection Time: 18  3:46 AM Result Value Ref Range    Phosphorus 3.2 2.6 - 4.7 MG/DL   METABOLIC PANEL, COMPREHENSIVE    Collection Time: 06/24/18  3:46 AM   Result Value Ref Range    Sodium 140 136 - 145 mmol/L    Potassium 4.6 3.5 - 5.1 mmol/L    Chloride 105 97 - 108 mmol/L    CO2 29 21 - 32 mmol/L    Anion gap 6 5 - 15 mmol/L    Glucose 85 65 - 100 mg/dL    BUN 27 (H) 6 - 20 MG/DL    Creatinine 3.91 (H) 0.55 - 1.02 MG/DL    BUN/Creatinine ratio 7 (L) 12 - 20      GFR est AA 16 (L) >60 ml/min/1.73m2    GFR est non-AA 13 (L) >60 ml/min/1.73m2    Calcium 8.2 (L) 8.5 - 10.1 MG/DL    Bilirubin, total 0.3 0.2 - 1.0 MG/DL    ALT (SGPT) 7 (L) 12 - 78 U/L    AST (SGOT) 13 (L) 15 - 37 U/L    Alk. phosphatase 55 45 - 117 U/L    Protein, total 5.9 (L) 6.4 - 8.2 g/dL    Albumin 1.7 (L) 3.5 - 5.0 g/dL    Globulin 4.2 (H) 2.0 - 4.0 g/dL    A-G Ratio 0.4 (L) 1.1 - 2.2     HEP B SURFACE AG    Collection Time: 06/24/18  5:30 AM   Result Value Ref Range    Hepatitis B surface Ag <0.10 Index    Hep B surface Ag Interp. NEGATIVE  NEG         Data Review images and reports reviewed basilar PTX    Assessment:     Principal Problem:    Sepsis ([address] (4/29/2018)    Active Problems:    Troponin level elevated (6/17/2018)        Plan/Recommendations/Medical Decision Making: Will increase suction to -40 for PTX  Will order am CXR  OK to restart home dose of Elaquis    Thank you for allowing us to participate in the care of your patient.     Malachi Ambrocio MD

## 2018-06-24 NOTE — PROGRESS NOTES
physical Therapy EVALUATION/DISCHARGE  Patient: Jessica Guerra (29 y.o. female)  Date: 6/24/2018  Primary Diagnosis: Sepsis ([address]  poor venous access  PARAPNEUMONIC EFFUSION   Procedure(s) (LRB):  RIGHT VIDEO ASSITED THORASCOPY, DECORTICATION (Right) 3 Days Post-Op   Precautions: chest tube, falls     ASSESSMENT :  Based on the objective data described below, the patient presents with mod I functional mobility but slightly decreased functional activity tolerance. She has been receiving HHPT due to a recent lengthy hospitalization. Today she was able to transfer OOB without assistance and ambulate 200 feet with IV pole. Recommend patient continue to ambulate daily with family or staff. Resumption of HHPT upon discharge is appropriate however no further acute care PT needs at this time. Skilled physical therapy is not indicated at this time. PLAN :  Discharge Recommendations: Home Health  Further Equipment Recommendations for Discharge: none     SUBJECTIVE:   Patient stated I think the home health PT was ordering me some equipment.     OBJECTIVE DATA SUMMARY:   HISTORY:    Past Medical History:   Diagnosis Date    Anemia     secondary to lupus    Asthma     no inhaler use in past 2 to 3 years    Carditis     Chronic kidney disease     ESRD    Chronic pain     DDD (degenerative disc disease), lumbar     ESRD (end stage renal disease) ([address]     GERD (gastroesophageal reflux disease)     Heart failure ([address]     Hemodialysis patient ([address] 12/21/2017    [address]  Tuesday,  Thursday,  and Saturday. 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     Poor historian 2018    With medications    Thromboembolus [facility]) 2013    lungs    Transfusion history     Last Transfusion 2017  at [facility]     Past Surgical History:   Procedure Laterality Date    HX  SECTION  11/2006    x1    HX OTHER SURGICAL  9/16/15    INSERTION PD CATH; Removed 2017    HX VASCULAR ACCESS Right 2017    Double-Lumen henry catheter upper chest     Prior Level of Function/Home Situation: independent prior to recent lengthy hospitalization, support from 6year old daughter and mother, hoping to travel to [facility] on    Personal factors and/or comorbidities impacting plan of care:     Home Situation  Home Environment: Private residence  Wheelchair Ramp: Yes  One/Two Story Residence: One story  Living Alone: No  Support Systems: Family member(s)  Patient Expects to be Discharged to[de-identified] Private residence  Current DME Used/Available at Home: None    EXAMINATION/PRESENTATION/DECISION MAKING:   Critical Behavior:  Neurologic State: Alert  Orientation Level: Oriented X4        Hearing: Auditory  Auditory Impairment: None    Range Of Motion:  AROM: Within functional limits                       Strength:    Strength: Generally decreased, functional                   Functional Mobility:  Bed Mobility:     Supine to Sit: Modified independent        Transfers:  Sit to Stand: Independent  Stand to Sit: Independent                       Balance:   Sitting: Intact  Standing: Intact  Ambulation/Gait Training:  Distance (ft): 200 Feet (ft)  Assistive Device:  (IV pole)        Gait Description (WDL): Exceptions to WDL                    Step Length: Left shortened;Right shortened            Functional Measure:  Functional Reach:    Completed:  [x] standing       [] seated           Average: 10       Functional Reach Test and G-code impairment scale:    For inches: Measure in cm and divide by 2.54  Percentage of Impairment CH    0%   CI    1-19% CJ    20-39% CK    40-59% CL    60-79% CM    80-99% CN     100%   Functional Reach  Score 15-25 cm 25 24 22-23 20-21 18-19 16-17 < 15   Functional Reach  Score 6-10 in 10      < 6        Functional reach: (standing)  Reaches £  6 in = High Fall Risk  Reaches 6-10 in = Moderate Fall Risk    Reaches ³  10 in = Low Fall Risk  Judy Sheppard et al. Functional reach: a new clinical measure of balance. J [facility] [address] Praveen; 39: [id number]. Modified Functional Reach: (seated)  Age: Men: Women:   21-39 17.9\" 17.6\"   40-59 17.5\" 15.9\"   60-79 14.4\" 13.2\"   80-97 14.0\" 12.5\"       Tavares Rosales. Forward and Lateral Sitting Functional Reach in Younger, Middle-aged, and Older Adults. J Geriatric Phys Ther. 2007; 30:43-48       G codes: In compliance with CMSs Claims Based Outcome Reporting, the following G-code set was chosen for this patient based on their primary functional limitation being treated: The outcome measure chosen to determine the severity of the functional limitation was the functional reach with a score of 10 inches which was correlated with the impairment scale.     ? Mobility - Walking and Moving Around:     - CURRENT STATUS: CH - 0% impaired, limited or restricted    - GOAL STATUS: CH - 0% impaired, limited or restricted    - D/C STATUS:  CH - 0% impaired, limited or restricted        Physical Therapy Evaluation Charge Determination   History Examination Presentation Decision-Making   LOW Complexity : Zero comorbidities / personal factors that will impact the outcome / POC LOW Complexity : 1-2 Standardized tests and measures addressing body structure, function, activity limitation and / or participation in recreation  LOW Complexity : Stable, uncomplicated  LOW Complexity : FOTO score of       Based on the above components, the patient evaluation is determined to be of the following complexity level: LOW     Pain:  Pain Scale 1: Numeric (0 - 10)  Pain Intensity 1: 6  Pain Location 1: Rib cage  Activity Tolerance:   HR 89 bpm  SpO2 99% on room air  Please refer to the flowsheet for vital signs taken during this treatment. After treatment:   []   Patient left in no apparent distress sitting up in chair  [x]   Patient left in no apparent distress in bed  [x]   Call bell left within reach  [x]   Nursing notified  []   Caregiver present  []   Bed alarm activated    COMMUNICATION/EDUCATION:   Communication/Collaboration:  [x]   Fall prevention education was provided and the patient/caregiver indicated understanding. [x]   Patient/family have participated as able and agree with findings and recommendations. []   Patient is unable to participate in plan of care at this time.   Findings and recommendations were discussed with: Registered Nurse    Thank you for this referral.  Maxine Maria, PT, DPT   Time Calculation: 23 mins

## 2018-06-24 NOTE — PROGRESS NOTES
Hospitalist Progress Note  Luisa Guerrero MD  Answering service: [phone] from in house phone        Date of Service:  2018  NAME:  Fareed Jacobson  :  1985  MRN:  [id number]      Admission Summary: This is a 59-year-old woman with a past medical history significant for end-stage renal disease on hemodialysis, lupus, asthma, thromboembolism, dyslipidemia, was in her usual state of health until the day of presentation at the emergency room when the patient developed shortness of breath. The shortness of breath is progressive, it is with little or no activity. Patient had routine dialysis done. After the dialysis, that is when the patient developed the shortness of breath. Patient used to be on peritoneal dialysis, but was converted to hemodialysis because of recurrent infection. The patient was last admitted to this hospital from 2018 to 2018. She was admitted and treated for sepsis, attributed to multiple abdominal abscesses. Patient underwent drainage of the abdominal abscess. Patient also underwent drainage of a right pleural effusion. She was discharged home to complete a course of Augmentin. She stated that she has been having shortness of breath on and off since she was discharged from hospital.  A day before coming to the emergency room here, she was seen at another emergency room. Patient was evaluated, but was not admitted. When the patient arrived at the emergency room, she was found to have elevated lactic acid level, low grade temperature, tachycardia. Code sepsis was called. The chest x-ray shows evidence of pneumonia Similar presentation last admission as well    Interval history / Subjective:    s/p Right video-assisted thoracoscopy and Full pulmonary decortication of the right lower lobe. Pt doing well , no CP/SOB chest xray shows small pneumo thorax.  Left IJ catheter will need adjustment. RN told to call PICC / vascular team.     Assessment & Plan:     Health care associated Pneumonia with partially loculated pleural effusion (?recurrent)  -CXR 6/17 Unchanged mild right pleural effusion with patchy opacification right base  -Vanc/Zosyn  -Follow cultures so far pending  -CT chest/abd 6/17 The pleural fluid on the right extends more superiorly in the right hemithorax and there is a more focal collection laterally measuring 5.4 cm may be partially loculated.  -Last admission patient had US guided removal with chest tube placed  -Consult thoracic surgery- s/p VATS on 6/21 CT tube in place draining to suction. Repeat chest xray shows small pneumo thorax RN to notify TS  - abx MGMT per ID    Abdominal cavity abscess (last admission)  -CT abd 6/17 Pigtail catheter overlies collection in the pelvis anteriorly which has diminished considerably in size since 5/16/2018 and there is a small amount of residual fluid noted    Suspected sepsis  -as above  -blood culture 6/16 alpha Strep in  1/2 bottles  -Repeat blood culture 6/18  Neg so far     ESRD  -on HD (TTS)  -Appreciate Nephrology    Anemia  -likely of chronic disease  -on EPO  Transfuse if < 7 will follow    Lupus  -Outpatient being followed by Dr Maria Victoria Kwong  -hold imuran     Chronic DVT  -on Eliquis - on hold for surgery     -resume today  if OK with thoracic surgery I told RN to check with vascular surgery.     Elevated troponin  -Likely from ESRD  - Cardiology consulted  -Echo EF 60-65% with no RWMA    Dyslipidemia  -continue home meds    Diet  Renal diet    Hypertension:  better    Code status: Full  DVT prophylaxis: Eliquis  PTA: home    Plan: Continue antibiotics, follow cultures,    Care Plan discussed with: Patient/Family  Disposition: TBD      Hospital Problems  Date Reviewed: 6/21/2018          Codes Class Noted POA    Troponin level elevated ICD-10-CM: R74.8  ICD-9-CM: 790.6  6/17/2018 Unknown        * (Principal)Sepsis ([facility] [address] ICD-10-CM: A41.9  ICD-9-CM: 038.9, 995.91  4/29/2018 Yes                Review of Systems:   A comprehensive review of systems was negative except for that written in the HPI. Vital Signs:    Last 24hrs VS reviewed since prior progress note. Most recent are:  Visit Vitals    BP (!) 129/93 (BP 1 Location: Left arm, BP Patient Position: At rest)    Pulse 93    Temp 98.1 °F (36.7 °C)    Resp 16    Ht 5' 5\" (1.651 m)    Wt 69.1 kg (152 lb 5.4 oz)    SpO2 96%    BMI 25.35 kg/m2         Intake/Output Summary (Last 24 hours) at 06/24/18 0746  Last data filed at 06/24/18 8745   Gross per 24 hour   Intake             1250 ml   Output              185 ml   Net             1065 ml        Physical Examination:             Constitutional:  No acute distress, cooperative, pleasant    ENT:  Oral mucous moist, oropharynx benign. Neck supple,    Resp:  Rt sided crackles up to mid lung CT tube in place left lung CTA   CV:  Regular rhythm, normal rate, no murmurs, gallops, rubs    GI:  Soft, non distended, non tender. normoactive bowel sounds, no hepatosplenomegaly, drain in place    Musculoskeletal:  No edema, warm, 2+ pulses throughout,Right AV fistula in place    Neurologic:  Moves all extremities. AAOx3, CN II-XII reviewed     Skin:  Good turgor, no rashes or ulcers       Data Review:    Review and/or order of clinical lab test      Labs:     Recent Labs      06/24/18   0346   WBC  5.8   HGB  7.1*   HCT  24.4*   PLT  161     Recent Labs      06/24/18   0346  06/22/18   0419   NA  140  139   K  4.6  3.9   CL  105  102   CO2  29  31   BUN  27*  18   CREA  3.91*  3.31*   GLU  85  91   CA  8.2*  8.3*   MG  2.0   --    PHOS  3.2   --      Recent Labs      06/24/18   0346   SGOT  13*   ALT  7*   AP  55   TBILI  0.3   TP  5.9*   ALB  1.7*   GLOB  4.2*     No results for input(s): INR, PTP, APTT in the last 72 hours.     No lab exists for component: INREXT, INREXT   No results for input(s): FE, TIBC, PSAT, FERR in the last 72 hours. Lab Results   Component Value Date/Time    Folate 13.7 06/17/2018 03:00 PM      No results for input(s): PH, PCO2, PO2 in the last 72 hours. No results for input(s): CPK, CKNDX, TROIQ in the last 72 hours.     No lab exists for component: CPKMB  Lab Results   Component Value Date/Time    Cholesterol, total 117 09/25/2015 02:02 PM    HDL Cholesterol 31 (L) 09/25/2015 02:02 PM    LDL, calculated 57 09/25/2015 02:02 PM    Triglyceride 146 09/25/2015 02:02 PM    CHOL/HDL Ratio 7.7 (H) 05/31/2009 10:30 AM     Lab Results   Component Value Date/Time    Glucose (POC) 123 (H) 04/20/2018 05:33 PM    Glucose (POC) 95 04/20/2018 11:19 AM    Glucose (POC) 100 04/20/2018 07:18 AM    Glucose (POC) 119 (H) 04/19/2018 09:11 PM    Glucose (POC) 86 04/19/2018 04:30 PM     Lab Results   Component Value Date/Time    Color YELLOW/STRAW 08/12/2016 09:06 PM    Appearance CLEAR 08/12/2016 09:06 PM    Specific gravity 1.017 08/12/2016 09:06 PM    Specific gravity 1.010 07/11/2016 12:44 PM    pH (UA) 7.0 08/12/2016 09:06 PM    Protein 300 (A) 08/12/2016 09:06 PM    Glucose NEGATIVE  08/12/2016 09:06 PM    Ketone TRACE (A) 08/12/2016 09:06 PM    Bilirubin NEGATIVE  07/11/2016 12:44 PM    Urobilinogen 1.0 08/12/2016 09:06 PM    Nitrites NEGATIVE  08/12/2016 09:06 PM    Leukocyte Esterase NEGATIVE  08/12/2016 09:06 PM    Epithelial cells MODERATE (A) 08/12/2016 09:06 PM    Bacteria 1+ (A) 08/12/2016 09:06 PM    WBC 0-4 08/12/2016 09:06 PM    RBC 0-5 08/12/2016 09:06 PM         Medications Reviewed:     Current Facility-Administered Medications   Medication Dose Route Frequency    alteplase (CATHFLO) 1 mg in sterile water (preservative free) 1 mL injection  1 mg InterCATHeter PRN    sodium chloride (NS) flush 10-30 mL  10-30 mL InterCATHeter PRN    sodium chloride (NS) flush 10 mL  10 mL InterCATHeter Q24H    sodium chloride (NS) flush 10 mL  10 mL InterCATHeter PRN    sodium chloride (NS) flush 10-40 mL  10-40 mL InterCATHeter Q8H    sodium chloride (NS) flush 20 mL  20 mL InterCATHeter Q24H    bacitracin 500 unit/gram packet 1 Packet  1 Packet Topical PRN    lactobac ac& pc-s.therm-b.anim (AJIT Q/RISAQUAD)  1 Cap Oral DAILY    senna (SENOKOT) tablet 8.6 mg  1 Tab Oral QHS    sodium chloride (NS) flush 5-10 mL  5-10 mL IntraVENous Q8H    sodium chloride (NS) flush 5-10 mL  5-10 mL IntraVENous PRN    oxyCODONE-acetaminophen (PERCOCET) 5-325 mg per tablet 2 Tab  2 Tab Oral Q4H PRN    fentaNYL citrate (PF) injection 50 mcg  50 mcg IntraVENous Q2H PRN    naloxone (NARCAN) injection 0.4 mg  0.4 mg IntraVENous PRN    docusate sodium (COLACE) capsule 100 mg  100 mg Oral BID    ketorolac (TORADOL) injection 15 mg  15 mg IntraVENous Q6H    Vancomycin random- 6/20; please draw at the beginning of dialysis   Other DIALYSIS ONCE    epoetin pia (EPOGEN;PROCRIT) injection 8,000 Units  8,000 Units SubCUTAneous DIALYSIS TUE, THU & SAT    vancomycin (VANCOCIN) 500 mg in 0.9% sodium chloride (MBP/ADV) 100 mL  500 mg IntraVENous DIALYSIS TUE, THU & SAT    albuterol-ipratropium (DUO-NEB) 2.5 MG-0.5 MG/3 ML  3 mL Nebulization Q4H PRN    piperacillin-tazobactam (ZOSYN) 3.375 g in 0.9% sodium chloride (MBP/ADV) 100 mL  3.375 g IntraVENous Q12H    Vancomycin Pharmacy Dosing   Other Rx Dosing/Monitoring    allopurinol (ZYLOPRIM) tablet 100 mg  100 mg Oral DAILY AFTER BREAKFAST    amitriptyline (ELAVIL) tablet 25 mg  25 mg Oral QHS PRN    amLODIPine (NORVASC) tablet 10 mg  10 mg Oral DAILY    cyanocobalamin (VITAMIN B12) tablet 2,500 mcg  2,500 mcg Oral DAILY    gemfibrozil (LOPID) tablet 300 mg  300 mg Oral DAILY    hydroxychloroquine (PLAQUENIL) tablet 200 mg  200 mg Oral BID    pantoprazole (PROTONIX) tablet 40 mg  40 mg Oral ACB    predniSONE (DELTASONE) tablet 5 mg  5 mg Oral DAILY    senna (SENOKOT) tablet 8.6 mg  1 Tab Oral DAILY PRN    ondansetron (ZOFRAN) injection 4 mg  4 mg IntraVENous Q4H PRN    arformoterol (BROVANA) neb solution 15 mcg  15 mcg Nebulization BID RT    And    budesonide (PULMICORT) 500 mcg/2 ml nebulizer suspension  500 mcg Nebulization BID RT    hydrALAZINE (APRESOLINE) 20 mg/mL injection 10 mg  10 mg IntraVENous Q6H PRN    carvedilol (COREG) tablet 25 mg  25 mg Oral BID WITH MEALS    losartan (COZAAR) tablet 50 mg  50 mg Oral DAILY    cloNIDine HCl (CATAPRES) tablet 0.1 mg  0.1 mg Oral BID     ______________________________________________________________________  EXPECTED LENGTH OF STAY: 10d 7h  ACTUAL LENGTH OF STAY:          Parish Gilmore MD

## 2018-06-25 ENCOUNTER — APPOINTMENT (OUTPATIENT)
Dept: GENERAL RADIOLOGY | Age: 33
DRG: 853 | End: 2018-06-25
Attending: THORACIC SURGERY (CARDIOTHORACIC VASCULAR SURGERY)
Payer: MEDICARE

## 2018-06-25 LAB
ALBUMIN SERPL-MCNC: 1.9 G/DL (ref 3.5–5)
ALBUMIN/GLOB SERPL: 0.4 {RATIO} (ref 1.1–2.2)
ALP SERPL-CCNC: 60 U/L (ref 45–117)
ALT SERPL-CCNC: 8 U/L (ref 12–78)
ANION GAP SERPL CALC-SCNC: 10 MMOL/L (ref 5–15)
AST SERPL-CCNC: 23 U/L (ref 15–37)
BACTERIA SPEC CULT: NORMAL
BILIRUB SERPL-MCNC: 0.4 MG/DL (ref 0.2–1)
BUN SERPL-MCNC: 36 MG/DL (ref 6–20)
BUN/CREAT SERPL: 7 (ref 12–20)
CALCIUM SERPL-MCNC: 8.7 MG/DL (ref 8.5–10.1)
CHLORIDE SERPL-SCNC: 103 MMOL/L (ref 97–108)
CO2 SERPL-SCNC: 26 MMOL/L (ref 21–32)
CREAT SERPL-MCNC: 5.18 MG/DL (ref 0.55–1.02)
ERYTHROCYTE [DISTWIDTH] IN BLOOD BY AUTOMATED COUNT: 19.2 % (ref 11.5–14.5)
GLOBULIN SER CALC-MCNC: 4.5 G/DL (ref 2–4)
GLUCOSE SERPL-MCNC: 89 MG/DL (ref 65–100)
GRAM STN SPEC: NORMAL
HCT VFR BLD AUTO: 24.1 % (ref 35–47)
HGB BLD-MCNC: 7.2 G/DL (ref 11.5–16)
MAGNESIUM SERPL-MCNC: 2 MG/DL (ref 1.6–2.4)
MCH RBC QN AUTO: 26.8 PG (ref 26–34)
MCHC RBC AUTO-ENTMCNC: 29.9 G/DL (ref 30–36.5)
MCV RBC AUTO: 89.6 FL (ref 80–99)
NRBC # BLD: 0 K/UL (ref 0–0.01)
NRBC BLD-RTO: 0 PER 100 WBC
PHOSPHATE SERPL-MCNC: 3.8 MG/DL (ref 2.6–4.7)
PLATELET # BLD AUTO: 199 K/UL (ref 150–400)
PMV BLD AUTO: 9.8 FL (ref 8.9–12.9)
POTASSIUM SERPL-SCNC: 4.9 MMOL/L (ref 3.5–5.1)
PROT SERPL-MCNC: 6.4 G/DL (ref 6.4–8.2)
RBC # BLD AUTO: 2.69 M/UL (ref 3.8–5.2)
SERVICE CMNT-IMP: NORMAL
SERVICE CMNT-IMP: NORMAL
SODIUM SERPL-SCNC: 139 MMOL/L (ref 136–145)
WBC # BLD AUTO: 9.1 K/UL (ref 3.6–11)

## 2018-06-25 PROCEDURE — 74011250637 HC RX REV CODE- 250/637: Performed by: INTERNAL MEDICINE

## 2018-06-25 PROCEDURE — 74011250637 HC RX REV CODE- 250/637: Performed by: HOSPITALIST

## 2018-06-25 PROCEDURE — 80053 COMPREHEN METABOLIC PANEL: CPT | Performed by: INTERNAL MEDICINE

## 2018-06-25 PROCEDURE — 74011000250 HC RX REV CODE- 250: Performed by: INTERNAL MEDICINE

## 2018-06-25 PROCEDURE — 74011250637 HC RX REV CODE- 250/637: Performed by: NURSE PRACTITIONER

## 2018-06-25 PROCEDURE — 74011000250 HC RX REV CODE- 250: Performed by: HOSPITALIST

## 2018-06-25 PROCEDURE — 36415 COLL VENOUS BLD VENIPUNCTURE: CPT | Performed by: INTERNAL MEDICINE

## 2018-06-25 PROCEDURE — 74011250636 HC RX REV CODE- 250/636: Performed by: INTERNAL MEDICINE

## 2018-06-25 PROCEDURE — 74011250637 HC RX REV CODE- 250/637: Performed by: THORACIC SURGERY (CARDIOTHORACIC VASCULAR SURGERY)

## 2018-06-25 PROCEDURE — 74011000258 HC RX REV CODE- 258: Performed by: INTERNAL MEDICINE

## 2018-06-25 PROCEDURE — 71045 X-RAY EXAM CHEST 1 VIEW: CPT

## 2018-06-25 PROCEDURE — 76450000000

## 2018-06-25 PROCEDURE — 74011250636 HC RX REV CODE- 250/636: Performed by: HOSPITALIST

## 2018-06-25 PROCEDURE — 94640 AIRWAY INHALATION TREATMENT: CPT

## 2018-06-25 PROCEDURE — 74011250636 HC RX REV CODE- 250/636: Performed by: THORACIC SURGERY (CARDIOTHORACIC VASCULAR SURGERY)

## 2018-06-25 PROCEDURE — 85027 COMPLETE CBC AUTOMATED: CPT | Performed by: INTERNAL MEDICINE

## 2018-06-25 PROCEDURE — 83735 ASSAY OF MAGNESIUM: CPT | Performed by: INTERNAL MEDICINE

## 2018-06-25 PROCEDURE — 65660000000 HC RM CCU STEPDOWN

## 2018-06-25 PROCEDURE — 74011250636 HC RX REV CODE- 250/636: Performed by: NURSE PRACTITIONER

## 2018-06-25 PROCEDURE — 84100 ASSAY OF PHOSPHORUS: CPT | Performed by: INTERNAL MEDICINE

## 2018-06-25 PROCEDURE — 74011250637 HC RX REV CODE- 250/637: Performed by: SPECIALIST

## 2018-06-25 PROCEDURE — 74011636637 HC RX REV CODE- 636/637: Performed by: INTERNAL MEDICINE

## 2018-06-25 RX ORDER — HALOPERIDOL 5 MG/ML
2 INJECTION INTRAMUSCULAR
Status: DISCONTINUED | OUTPATIENT
Start: 2018-06-25 | End: 2018-06-28 | Stop reason: HOSPADM

## 2018-06-25 RX ORDER — AZATHIOPRINE 50 MG/1
50 TABLET ORAL
Status: DISCONTINUED | OUTPATIENT
Start: 2018-06-25 | End: 2018-06-28 | Stop reason: HOSPADM

## 2018-06-25 RX ORDER — PREDNISONE 10 MG/1
10 TABLET ORAL DAILY
Status: DISCONTINUED | OUTPATIENT
Start: 2018-06-26 | End: 2018-06-28 | Stop reason: HOSPADM

## 2018-06-25 RX ORDER — OXYCODONE HYDROCHLORIDE 5 MG/1
10 TABLET ORAL
Status: DISCONTINUED | OUTPATIENT
Start: 2018-06-25 | End: 2018-06-28 | Stop reason: HOSPADM

## 2018-06-25 RX ORDER — CLONIDINE HYDROCHLORIDE 0.1 MG/1
0.1 TABLET ORAL
Status: COMPLETED | OUTPATIENT
Start: 2018-06-25 | End: 2018-06-25

## 2018-06-25 RX ORDER — CLONIDINE HYDROCHLORIDE 0.1 MG/1
0.1 TABLET ORAL 3 TIMES DAILY
Status: DISCONTINUED | OUTPATIENT
Start: 2018-06-25 | End: 2018-06-28 | Stop reason: HOSPADM

## 2018-06-25 RX ORDER — HYDROMORPHONE HYDROCHLORIDE 1 MG/ML
0.5 INJECTION, SOLUTION INTRAMUSCULAR; INTRAVENOUS; SUBCUTANEOUS
Status: DISCONTINUED | OUTPATIENT
Start: 2018-06-25 | End: 2018-06-28 | Stop reason: HOSPADM

## 2018-06-25 RX ORDER — HYDRALAZINE HYDROCHLORIDE 20 MG/ML
20 INJECTION INTRAMUSCULAR; INTRAVENOUS
Status: DISCONTINUED | OUTPATIENT
Start: 2018-06-25 | End: 2018-06-28 | Stop reason: HOSPADM

## 2018-06-25 RX ADMIN — Medication 10 ML: at 19:51

## 2018-06-25 RX ADMIN — ONDANSETRON 4 MG: 2 INJECTION INTRAMUSCULAR; INTRAVENOUS at 19:51

## 2018-06-25 RX ADMIN — Medication 10 ML: at 05:11

## 2018-06-25 RX ADMIN — CARVEDILOL 25 MG: 12.5 TABLET, FILM COATED ORAL at 09:04

## 2018-06-25 RX ADMIN — APIXABAN 2.5 MG: 2.5 TABLET, FILM COATED ORAL at 17:17

## 2018-06-25 RX ADMIN — OXYCODONE HYDROCHLORIDE 10 MG: 5 TABLET ORAL at 22:10

## 2018-06-25 RX ADMIN — HYDROXYCHLOROQUINE SULFATE 200 MG: 200 TABLET, FILM COATED ORAL at 17:17

## 2018-06-25 RX ADMIN — Medication 10 ML: at 22:10

## 2018-06-25 RX ADMIN — PIPERACILLIN SODIUM,TAZOBACTAM SODIUM 3.38 G: 3; .375 INJECTION, POWDER, FOR SOLUTION INTRAVENOUS at 10:24

## 2018-06-25 RX ADMIN — CLONIDINE HYDROCHLORIDE 0.1 MG: 0.1 TABLET ORAL at 03:38

## 2018-06-25 RX ADMIN — CLONIDINE HYDROCHLORIDE 0.1 MG: 0.1 TABLET ORAL at 09:04

## 2018-06-25 RX ADMIN — Medication 20 ML: at 07:10

## 2018-06-25 RX ADMIN — FENTANYL CITRATE 50 MCG: 50 INJECTION, SOLUTION INTRAMUSCULAR; INTRAVENOUS at 09:17

## 2018-06-25 RX ADMIN — DOCUSATE SODIUM 100 MG: 100 CAPSULE, LIQUID FILLED ORAL at 17:18

## 2018-06-25 RX ADMIN — ONDANSETRON 4 MG: 2 INJECTION INTRAMUSCULAR; INTRAVENOUS at 10:42

## 2018-06-25 RX ADMIN — ONDANSETRON 4 MG: 2 INJECTION INTRAMUSCULAR; INTRAVENOUS at 02:32

## 2018-06-25 RX ADMIN — Medication 2500 MCG: at 09:24

## 2018-06-25 RX ADMIN — HYDROMORPHONE HYDROCHLORIDE 0.5 MG: 1 INJECTION, SOLUTION INTRAMUSCULAR; INTRAVENOUS; SUBCUTANEOUS at 23:53

## 2018-06-25 RX ADMIN — HYDROMORPHONE HYDROCHLORIDE 0.5 MG: 1 INJECTION, SOLUTION INTRAMUSCULAR; INTRAVENOUS; SUBCUTANEOUS at 19:50

## 2018-06-25 RX ADMIN — BUDESONIDE 500 MCG: 0.5 INHALANT RESPIRATORY (INHALATION) at 08:13

## 2018-06-25 RX ADMIN — Medication 10 ML: at 05:12

## 2018-06-25 RX ADMIN — Medication 10 ML: at 09:18

## 2018-06-25 RX ADMIN — PANTOPRAZOLE SODIUM 40 MG: 40 TABLET, DELAYED RELEASE ORAL at 07:09

## 2018-06-25 RX ADMIN — GEMFIBROZIL 300 MG: 600 TABLET ORAL at 09:05

## 2018-06-25 RX ADMIN — FENTANYL CITRATE 50 MCG: 50 INJECTION, SOLUTION INTRAMUSCULAR; INTRAVENOUS at 05:11

## 2018-06-25 RX ADMIN — HYDROMORPHONE HYDROCHLORIDE 0.5 MG: 1 INJECTION, SOLUTION INTRAMUSCULAR; INTRAVENOUS; SUBCUTANEOUS at 15:44

## 2018-06-25 RX ADMIN — FENTANYL CITRATE 50 MCG: 50 INJECTION, SOLUTION INTRAMUSCULAR; INTRAVENOUS at 03:05

## 2018-06-25 RX ADMIN — PIPERACILLIN SODIUM,TAZOBACTAM SODIUM 3.38 G: 3; .375 INJECTION, POWDER, FOR SOLUTION INTRAVENOUS at 22:10

## 2018-06-25 RX ADMIN — ALLOPURINOL 100 MG: 100 TABLET ORAL at 09:04

## 2018-06-25 RX ADMIN — LOSARTAN POTASSIUM 50 MG: 50 TABLET ORAL at 09:04

## 2018-06-25 RX ADMIN — CARVEDILOL 25 MG: 12.5 TABLET, FILM COATED ORAL at 17:17

## 2018-06-25 RX ADMIN — HYDROXYCHLOROQUINE SULFATE 200 MG: 200 TABLET, FILM COATED ORAL at 09:04

## 2018-06-25 RX ADMIN — HYDRALAZINE HYDROCHLORIDE 10 MG: 20 INJECTION INTRAMUSCULAR; INTRAVENOUS at 00:12

## 2018-06-25 RX ADMIN — CLONIDINE HYDROCHLORIDE 0.1 MG: 0.1 TABLET ORAL at 22:10

## 2018-06-25 RX ADMIN — DOCUSATE SODIUM 100 MG: 100 CAPSULE, LIQUID FILLED ORAL at 09:05

## 2018-06-25 RX ADMIN — HYDRALAZINE HYDROCHLORIDE 20 MG: 20 INJECTION INTRAMUSCULAR; INTRAVENOUS at 23:52

## 2018-06-25 RX ADMIN — PREDNISONE 5 MG: 5 TABLET ORAL at 09:05

## 2018-06-25 RX ADMIN — Medication 10 ML: at 11:33

## 2018-06-25 RX ADMIN — FENTANYL CITRATE 50 MCG: 50 INJECTION, SOLUTION INTRAMUSCULAR; INTRAVENOUS at 13:47

## 2018-06-25 RX ADMIN — AMITRIPTYLINE HYDROCHLORIDE 25 MG: 50 TABLET, FILM COATED ORAL at 22:17

## 2018-06-25 RX ADMIN — CLONIDINE HYDROCHLORIDE 0.1 MG: 0.1 TABLET ORAL at 15:44

## 2018-06-25 RX ADMIN — FENTANYL CITRATE 50 MCG: 50 INJECTION, SOLUTION INTRAMUSCULAR; INTRAVENOUS at 00:51

## 2018-06-25 RX ADMIN — FENTANYL CITRATE 50 MCG: 50 INJECTION, SOLUTION INTRAMUSCULAR; INTRAVENOUS at 11:32

## 2018-06-25 RX ADMIN — APIXABAN 2.5 MG: 2.5 TABLET, FILM COATED ORAL at 09:04

## 2018-06-25 RX ADMIN — Medication 1 CAPSULE: at 09:04

## 2018-06-25 RX ADMIN — Medication 10 ML: at 23:52

## 2018-06-25 RX ADMIN — Medication 10 ML: at 15:17

## 2018-06-25 RX ADMIN — ARFORMOTEROL TARTRATE 15 MCG: 15 SOLUTION RESPIRATORY (INHALATION) at 08:13

## 2018-06-25 RX ADMIN — AMLODIPINE BESYLATE 10 MG: 5 TABLET ORAL at 09:04

## 2018-06-25 RX ADMIN — WATER 1 MG: 1 INJECTION INTRAMUSCULAR; INTRAVENOUS; SUBCUTANEOUS at 15:28

## 2018-06-25 RX ADMIN — Medication 10 ML: at 07:09

## 2018-06-25 RX ADMIN — AZATHIOPRINE 50 MG: 50 TABLET ORAL at 11:31

## 2018-06-25 RX ADMIN — Medication 10 ML: at 15:44

## 2018-06-25 RX ADMIN — FENTANYL CITRATE 50 MCG: 50 INJECTION, SOLUTION INTRAMUSCULAR; INTRAVENOUS at 07:08

## 2018-06-25 NOTE — PROGRESS NOTES
Pt has had an elevated -178/100's. (Pt has a history of elevated BP during this stay with parameters of SBP >170)  Pt has been c/o pain and stated \"once my pain is managed my BP gets better\" Pt has rec'd 50mcg of Fentanyl Q2H. Pain does not seem to be relieved. Also pt has been nauseated with 1 episode of vomiting. Pt has rec'd zofran x2 doses and has helped with nausea. At 0300 vital signs BP still elevated paged on call MD (Dr. Narinder Pagan) and advised of BP issues. Also advised of x1 dose of hydralazine and BP not budging. Ordered 0.1mg clonidine x1 dose now.       Will advise day RN of scenerio

## 2018-06-25 NOTE — PROGRESS NOTES
Hospitalist Progress Note  Ariana Castaneda MD  Answering service: [phone] or [phone] from in house phone        Date of Service:  2018  NAME:  Adriano Larios  :  1985  MRN:  [id number]      Admission Summary: This is a 80-year-old woman with a past medical history significant for end-stage renal disease on hemodialysis, lupus, asthma, thromboembolism, dyslipidemia, was in her usual state of health until the day of presentation at the emergency room when the patient developed shortness of breath. The shortness of breath is progressive, it is with little or no activity. Patient had routine dialysis done. After the dialysis, that is when the patient developed the shortness of breath. Patient used to be on peritoneal dialysis, but was converted to hemodialysis because of recurrent infection. The patient was last admitted to this hospital from 2018 to 2018. She was admitted and treated for sepsis, attributed to multiple abdominal abscesses. Patient underwent drainage of the abdominal abscess. Patient also underwent drainage of a right pleural effusion. She was discharged home to complete a course of Augmentin. She stated that she has been having shortness of breath on and off since she was discharged from hospital.  A day before coming to the emergency room here, she was seen at another emergency room. Patient was evaluated, but was not admitted. When the patient arrived at the emergency room, she was found to have elevated lactic acid level, low grade temperature, tachycardia. Code sepsis was called. The chest x-ray shows evidence of pneumonia Similar presentation last admission as well    Interval history / Subjective:   s/p Right video-assisted thoracoscopy and Full pulmonary decortication of the right lower lobe.   Pt c/o pain all night could not sleep , per RN got 50 mcg fentanyl Q 2 hrs   Pt does not like percocet, her BP is high  Pt asking if she is having flare of Lupus     Assessment & Plan:     Health care associated Pneumonia with partially loculated pleural effusion (?recurrent)  -CXR 6/17 Unchanged mild right pleural effusion with patchy opacification right base  -Vanc/Zosyn  -Follow cultures so far pending  -CT chest/abd 6/17 The pleural fluid on the right extends more superiorly in the right hemithorax and there is a more focal collection laterally measuring 5.4 cm may be partially loculated.  -Last admission patient had US guided removal with chest tube placed  -Consult thoracic surgery- s/p VATS on 6/21 CT tube in place draining to suction.  Repeat chest xray today will follow up with thoracic surgery recommendation there was a small PTX   - abx MGMT per ID on vanc / zosyn day 9 may stop soon cultures NGTD from 6/21    Abdominal cavity abscess (last admission)  -CT abd 6/17 Pigtail catheter overlies collection in the pelvis anteriorly which has diminished considerably in size since 5/16/2018 and there is a small amount of residual fluid noted    Suspected sepsis  -as above  -blood culture 6/16 alpha Strep in  1/2 bottles  -Repeat blood culture 6/18  Neg so far   - other body fluid cultures NGTD 6/21    ESRD  -on HD (TTS)  -Appreciate Nephrology  - next tomorrow    Anemia  -likely of chronic disease  -on EPO  Transfuse if < 7 will follow    Lupus  -Outpatient being followed by Dr Richard Smith    -d/w Dr. Wen Lomas will increase prednisone to 10 for now and see how she does also can start Imuran but will take few days to kick in    - On prednisone and Plaquenil    Chronic DVT  -on Eliquis resumed after surgery    Elevated troponin  -Likely from ESRD  - Cardiology consulted  -Echo EF 60-65% with no RWMA    Dyslipidemia  -continue home meds    Diet  Renal diet    Hypertension: uncontrolled  Increased Clonidine/ on Hydralazine need volume removal after HD and need pain control  Pall care consulted for pain control    Code status: Full  DVT prophylaxis: Eliquis  PTA: home    Plan: Continue antibiotics, follow cultures, ID and thoracic surgery recommendation    Care Plan discussed with: Patient/Family  Disposition: TBD [place] Problems  Date Reviewed: 6/21/2018          Codes Class Noted POA    Troponin level elevated ICD-10-CM: R74.8  ICD-9-CM: 790.6  6/17/2018 Unknown        * (Principal)Sepsis ([facility] [address] ICD-10-CM: A41.9  ICD-9-CM: 038.9, 995.91  4/29/2018 Yes                Review of Systems:   A comprehensive review of systems was negative except for that written in the HPI. Vital Signs:    Last 24hrs VS reviewed since prior progress note. Most recent are:  Visit Vitals    BP (!) 161/105    Pulse (!) 101    Temp 98.4 °F (36.9 °C)    Resp 18    Ht 5' 5\" (1.651 m)    Wt 70.3 kg (154 lb 15.7 oz)    SpO2 98%    BMI 25.79 kg/m2         Intake/Output Summary (Last 24 hours) at 06/25/18 5732  Last data filed at 06/25/18 0518   Gross per 24 hour   Intake              820 ml   Output               45 ml   Net              775 ml        Physical Examination:             Constitutional:  No acute distress, cooperative, pleasant    ENT:  Oral mucous moist, oropharynx benign. Neck supple,    Resp:  Rt sided crackles up to mid lung CT tube in place left lung CTA   CV:  Regular rhythm, normal rate, no murmurs, gallops, rubs    GI:  Soft, non distended, non tender. normoactive bowel sounds, no hepatosplenomegaly, drain in place    Musculoskeletal:  No edema, warm, 2+ pulses throughout,Right AV fistula in place    Neurologic:  Moves all extremities.   AAOx3, CN II-XII reviewed     Skin:  Good turgor, no rashes or ulcers       Data Review:    Review and/or order of clinical lab test      Labs:     Recent Labs      06/25/18   0347  06/24/18   0346   WBC  9.1  5.8   HGB  7.2*  7.1*   HCT  24.1*  24.4*   PLT  199  161     Recent Labs      06/25/18   0347  06/24/18   0346   NA  139  140   K  4.9  4.6   CL  103  105   CO2 26  29   BUN  36*  27*   CREA  5.18*  3.91*   GLU  89  85   CA  8.7  8.2*   MG  2.0  2.0   PHOS  3.8  3.2     Recent Labs      06/25/18   0347  06/24/18   0346   SGOT  23  13*   ALT  8*  7*   AP  60  55   TBILI  0.4  0.3   TP  6.4  5.9*   ALB  1.9*  1.7*   GLOB  4.5*  4.2*     No results for input(s): INR, PTP, APTT in the last 72 hours. No lab exists for component: INREXT, INREXT   No results for input(s): FE, TIBC, PSAT, FERR in the last 72 hours. Lab Results   Component Value Date/Time    Folate 13.7 06/17/2018 03:00 PM      No results for input(s): PH, PCO2, PO2 in the last 72 hours. No results for input(s): CPK, CKNDX, TROIQ in the last 72 hours.     No lab exists for component: CPKMB  Lab Results   Component Value Date/Time    Cholesterol, total 117 09/25/2015 02:02 PM    HDL Cholesterol 31 (L) 09/25/2015 02:02 PM    LDL, calculated 57 09/25/2015 02:02 PM    Triglyceride 146 09/25/2015 02:02 PM    CHOL/HDL Ratio 7.7 (H) 05/31/2009 10:30 AM     Lab Results   Component Value Date/Time    Glucose (POC) 123 (H) 04/20/2018 05:33 PM    Glucose (POC) 95 04/20/2018 11:19 AM    Glucose (POC) 100 04/20/2018 07:18 AM    Glucose (POC) 119 (H) 04/19/2018 09:11 PM    Glucose (POC) 86 04/19/2018 04:30 PM     Lab Results   Component Value Date/Time    Color YELLOW/STRAW 08/12/2016 09:06 PM    Appearance CLEAR 08/12/2016 09:06 PM    Specific gravity 1.017 08/12/2016 09:06 PM    Specific gravity 1.010 07/11/2016 12:44 PM    pH (UA) 7.0 08/12/2016 09:06 PM    Protein 300 (A) 08/12/2016 09:06 PM    Glucose NEGATIVE  08/12/2016 09:06 PM    Ketone TRACE (A) 08/12/2016 09:06 PM    Bilirubin NEGATIVE  07/11/2016 12:44 PM    Urobilinogen 1.0 08/12/2016 09:06 PM    Nitrites NEGATIVE  08/12/2016 09:06 PM    Leukocyte Esterase NEGATIVE  08/12/2016 09:06 PM    Epithelial cells MODERATE (A) 08/12/2016 09:06 PM    Bacteria 1+ (A) 08/12/2016 09:06 PM    WBC 0-4 08/12/2016 09:06 PM    RBC 0-5 08/12/2016 09:06 PM         Medications Reviewed:     Current Facility-Administered Medications   Medication Dose Route Frequency    hydrALAZINE (APRESOLINE) 20 mg/mL injection 20 mg  20 mg IntraVENous Q6H PRN    cloNIDine HCl (CATAPRES) tablet 0.1 mg  0.1 mg Oral TID    apixaban (ELIQUIS) tablet 2.5 mg  2.5 mg Oral BID    alteplase (CATHFLO) 1 mg in sterile water (preservative free) 1 mL injection  1 mg InterCATHeter PRN    sodium chloride (NS) flush 10-30 mL  10-30 mL InterCATHeter PRN    sodium chloride (NS) flush 10 mL  10 mL InterCATHeter Q24H    sodium chloride (NS) flush 10 mL  10 mL InterCATHeter PRN    sodium chloride (NS) flush 10-40 mL  10-40 mL InterCATHeter Q8H    sodium chloride (NS) flush 20 mL  20 mL InterCATHeter Q24H    bacitracin 500 unit/gram packet 1 Packet  1 Packet Topical PRN    lactobac ac& pc-s.therm-b.anim (AJIT Q/RISAQUAD)  1 Cap Oral DAILY    senna (SENOKOT) tablet 8.6 mg  1 Tab Oral QHS    sodium chloride (NS) flush 5-10 mL  5-10 mL IntraVENous Q8H    sodium chloride (NS) flush 5-10 mL  5-10 mL IntraVENous PRN    oxyCODONE-acetaminophen (PERCOCET) 5-325 mg per tablet 2 Tab  2 Tab Oral Q4H PRN    fentaNYL citrate (PF) injection 50 mcg  50 mcg IntraVENous Q2H PRN    naloxone (NARCAN) injection 0.4 mg  0.4 mg IntraVENous PRN    docusate sodium (COLACE) capsule 100 mg  100 mg Oral BID    Vancomycin random- 6/20; please draw at the beginning of dialysis   Other DIALYSIS ONCE    epoetin pia (EPOGEN;PROCRIT) injection 8,000 Units  8,000 Units SubCUTAneous DIALYSIS TUE, THU & SAT    vancomycin (VANCOCIN) 500 mg in 0.9% sodium chloride (MBP/ADV) 100 mL  500 mg IntraVENous DIALYSIS TUE, THU & SAT    albuterol-ipratropium (DUO-NEB) 2.5 MG-0.5 MG/3 ML  3 mL Nebulization Q4H PRN    piperacillin-tazobactam (ZOSYN) 3.375 g in 0.9% sodium chloride (MBP/ADV) 100 mL  3.375 g IntraVENous Q12H    Vancomycin Pharmacy Dosing   Other Rx Dosing/Monitoring    allopurinol (ZYLOPRIM) tablet 100 mg  100 mg Oral DAILY AFTER BREAKFAST    amitriptyline (ELAVIL) tablet 25 mg  25 mg Oral QHS PRN    amLODIPine (NORVASC) tablet 10 mg  10 mg Oral DAILY    cyanocobalamin (VITAMIN B12) tablet 2,500 mcg  2,500 mcg Oral DAILY    gemfibrozil (LOPID) tablet 300 mg  300 mg Oral DAILY    hydroxychloroquine (PLAQUENIL) tablet 200 mg  200 mg Oral BID    pantoprazole (PROTONIX) tablet 40 mg  40 mg Oral ACB    predniSONE (DELTASONE) tablet 5 mg  5 mg Oral DAILY    senna (SENOKOT) tablet 8.6 mg  1 Tab Oral DAILY PRN    ondansetron (ZOFRAN) injection 4 mg  4 mg IntraVENous Q4H PRN    arformoterol (BROVANA) neb solution 15 mcg  15 mcg Nebulization BID RT    And    budesonide (PULMICORT) 500 mcg/2 ml nebulizer suspension  500 mcg Nebulization BID RT    carvedilol (COREG) tablet 25 mg  25 mg Oral BID WITH MEALS    losartan (COZAAR) tablet 50 mg  50 mg Oral DAILY     ______________________________________________________________________  EXPECTED LENGTH OF STAY: 10d 7h  ACTUAL LENGTH OF STAY:          8                 Simona Kowalski MD

## 2018-06-25 NOTE — PROGRESS NOTES
Thoracic Surgery Simple Progress Note    Admit Date: 2018  POD: 4 Days Post-Op      Procedure:  Procedure(s):  RIGHT VIDEO ASSITED THORASCOPY, DECORTICATION      Subjective:     Patient has complaints: No significant medical complaints    Review of Systems:    CARDIAC: positive for HTN  RESP: positive for pleural effusion  NEURO:  negative  INCISION: Clean, dry, and intact  EXT: Denies new swelling or pain in the legs or calves. Objective:     Blood pressure (!) 161/105, pulse (!) 101, temperature 98.4 °F (36.9 °C), resp. rate 18, height 5' 5\" (1.651 m), weight 154 lb 15.7 oz (70.3 kg), SpO2 98 %. Temp (24hrs), Av.4 °F (36.9 °C), Min:98 °F (36.7 °C), Max:99 °F (37.2 °C)        Hemodynamics    PAP    CO    CI        1901 -  0700  In: 9378 [P.O.:1140; I.V.:600]  Out: 145 [Drains:75]    EXAM:  GENERAL: VSS, afrible, alert and cooperative  HEART:  regular rate and rhythm  LUNG: clear to auscultation bilaterally, right sided chest tube in place, minimal drainage, no air leak  NEURO:  normal without focal findings  mental status, speech normal, alert and oriented x iii  INCISION: Clean, dry, and intact  EXTREMITIES:No evidence of DVT seen on physical exam.  GI/: Abd soft, nonterder with + bowel sounds.  HD T/Th/Sat    Labs:  Recent Results (from the past 24 hour(s))   CBC W/O DIFF    Collection Time: 18  3:47 AM   Result Value Ref Range    WBC 9.1 3.6 - 11.0 K/uL    RBC 2.69 (L) 3.80 - 5.20 M/uL    HGB 7.2 (L) 11.5 - 16.0 g/dL    HCT 24.1 (L) 35.0 - 47.0 %    MCV 89.6 80.0 - 99.0 FL    MCH 26.8 26.0 - 34.0 PG    MCHC 29.9 (L) 30.0 - 36.5 g/dL    RDW 19.2 (H) 11.5 - 14.5 %    PLATELET 411 032 - 548 K/uL    MPV 9.8 8.9 - 12.9 FL    NRBC 0.0 0  WBC    ABSOLUTE NRBC 0.00 0.00 - 0.01 K/uL   MAGNESIUM    Collection Time: 18  3:47 AM   Result Value Ref Range    Magnesium 2.0 1.6 - 2.4 mg/dL   PHOSPHORUS    Collection Time: 18  3:47 AM   Result Value Ref Range    Phosphorus 3.8 2.6 - 4.7 MG/DL   METABOLIC PANEL, COMPREHENSIVE    Collection Time: 06/25/18  3:47 AM   Result Value Ref Range    Sodium 139 136 - 145 mmol/L    Potassium 4.9 3.5 - 5.1 mmol/L    Chloride 103 97 - 108 mmol/L    CO2 26 21 - 32 mmol/L    Anion gap 10 5 - 15 mmol/L    Glucose 89 65 - 100 mg/dL    BUN 36 (H) 6 - 20 MG/DL    Creatinine 5.18 (H) 0.55 - 1.02 MG/DL    BUN/Creatinine ratio 7 (L) 12 - 20      GFR est AA 12 (L) >60 ml/min/1.73m2    GFR est non-AA 10 (L) >60 ml/min/1.73m2    Calcium 8.7 8.5 - 10.1 MG/DL    Bilirubin, total 0.4 0.2 - 1.0 MG/DL    ALT (SGPT) 8 (L) 12 - 78 U/L    AST (SGOT) 23 15 - 37 U/L    Alk. phosphatase 60 45 - 117 U/L    Protein, total 6.4 6.4 - 8.2 g/dL    Albumin 1.9 (L) 3.5 - 5.0 g/dL    Globulin 4.5 (H) 2.0 - 4.0 g/dL    A-G Ratio 0.4 (L) 1.1 - 2.2         Assessment:   No evidence of DVT.   Principal Problem:    Sepsis ([address] (4/29/2018)    Active Problems:    Troponin level elevated (6/17/2018)      PATH: Chronic pleuritis                                     Plan/Recommendations:   Chest tube to gravity  Ambulate    See orders    Signed By: Samara PINK

## 2018-06-25 NOTE — PROGRESS NOTES
0363 Radiology phoned about the two view x ray ordered; Nereida Mederos NP on the unit advised the xray taken this morning was OK; Advised Radiology of the decision.   Charisma Barrientos

## 2018-06-25 NOTE — CONSULTS
Palliative Medicine Consult  [place]: [phone] ([phone])    Patient Name: Rodrick Quintana  YOB: 1985    Date of Initial Consult: June 25, 2018  Reason for Consult: Pain Management  Requesting Provider: Dr. Tamika Hinkle  Primary Care Physician: Radha Urena NP     SUMMARY:   Rodrick Quintana is a 29 y.o. with a past history of SLE, ESRD (dialysis), pulmonary embolism, on Eliquis since 2012, HLD, HTN, Candida peritonitis, chronic back pain,abdominal cavity abscess  who was admitted on 6/16/2018 from home due to shortness of breath and was diagnosed with sepsis, HCAP, elevated troponin level, leukopenia. Other issues include partially loculated pleural effusion s/p rt VATS and full pulmonary decortication of the right lower lobe,     Pt is chronically on opioids prescribed by Dr. Kamlesh Torres [phone] for back pain oxycodone 5mg. Pain control suboptimal despite iv fentanyl      Current medical issues leading to Palliative Medicine involvement include: pain management. PALLIATIVE DIAGNOSES:   1. Generalized Pain  2. Nausea and vomiting  3. Hypoalbuminemia  4. Physical debility     PLAN:   1. Pt known to me from previous admission. She tends to respond better to iv dilaudid vs fentanyl. Says oral meds are ineffective and inducing nausea and vomiting  2. Dc fentanyl and percocet  3. Dilaudid 0.5mg iv q 4 prn for pain not responding to oral   4. Oxycodone IR 10mg q 3 prn  5. Discussed above with the attending. Unclear of the source of pain  6. Try haldol 2mg q 4 prn for nausea  7. Will follow up tomorrow  8. Initial consult note routed to primary continuity provider  9.  Communicated plan of care with: Palliative IDT       GOALS OF CARE / TREATMENT PREFERENCES:     GOALS OF CARE:  Patient/Health Care Proxy Stated Goals: Prolong life      TREATMENT PREFERENCES:   Code Status: Full Code    Advance Care Planning:  Advance Care Planning 6/25/2018   Patient's Healthcare Decision Maker is: Legal Next of Maria Antonia Hines   Primary Decision Maker Name David Harper   Primary Decision Maker Phone Number [phone]   Primary Decision Maker Relationship to Patient Parent   Confirm Advance Directive -   Patient Would Like to Complete Advance Directive -       Medical Interventions: Full interventions   Other Instructions: full restorative therapies      Other:    As far as possible, the palliative care team has discussed with patient / health care proxy about goals of care / treatment preferences for patient. HISTORY:     History obtained from: pt, attending, chart    CHIEF COMPLAINT: pain all over    HPI/SUBJECTIVE:    The patient is:   [x] Verbal and participatory  [] Non-participatory due to:      Pt taking iv fentanyl 50 mcq q 2 hours without adequate relief of pain. Percocet ordered but the pt is not taking it.      Clinical Pain Assessment (nonverbal scale for severity on nonverbal patients):   Clinical Pain Assessment  Severity: 4  Location: all over  Character: can't describe it  Duration: days  Effect: limited activity  Factors: iv dilaudid helps  Frequency: all the time          Duration: for how long has pt been experiencing pain (e.g., 2 days, 1 month, years)  Frequency: how often pain is an issue (e.g., several times per day, once every few days, constant)     FUNCTIONAL ASSESSMENT:     Palliative Performance Scale (PPS):  PPS: 60       PSYCHOSOCIAL/SPIRITUAL SCREENING:     Palliative IDT has assessed this patient for cultural preferences / practices and a referral made as appropriate to needs (Cultural Services, Patient Advocacy, Ethics, etc.)    Advance Care Planning:  Advance Care Planning 6/25/2018   Patient's Healthcare Decision Maker is: Legal Next of Maria Antonia Hines   Primary Decision Maker Name David Harper   Primary Decision Maker Phone Number [phone]   Primary Decision Maker Relationship to Patient Parent   Confirm Advance Directive -   Patient Would Like to Complete Advance Directive - Any spiritual / Anabaptist concerns:  [] Yes /  [x] No    Caregiver Burnout:  [] Yes /  [x] No /  [] No Caregiver Present      Anticipatory grief assessment:   [x] Normal  / [] Maladaptive       ESAS Anxiety: Anxiety: 0    ESAS Depression: Depression: 0        REVIEW OF SYSTEMS:     Positive and pertinent negative findings in ROS are noted above in HPI. The following systems were [x] reviewed / [] unable to be reviewed as noted in HPI  Other findings are noted below. Systems: constitutional, ears/nose/mouth/throat, respiratory, gastrointestinal, genitourinary, musculoskeletal, integumentary, neurologic, psychiatric, endocrine. Positive findings noted below. Modified ESAS Completed by: provider   Fatigue: 0 Drowsiness: 0   Depression: 0 Pain: 4   Anxiety: 0 Nausea: 3     Dyspnea: 0           Stool Occurrence(s): 1        PHYSICAL EXAM:     From RN flowsheet:  Wt Readings from Last 3 Encounters:   06/25/18 154 lb 15.7 oz (70.3 kg)   06/12/18 138 lb (62.6 kg)   06/05/18 137 lb (62.1 kg)     Blood pressure (!) 143/100, pulse 92, temperature 99 °F (37.2 °C), resp. rate 18, height 5' 5\" (1.651 m), weight 154 lb 15.7 oz (70.3 kg), SpO2 98 %.     Pain Scale 1: Numeric (0 - 10)  Pain Intensity 1: 8  Pain Onset 1: post op  Pain Location 1: Abdomen, Rib cage  Pain Orientation 1: Right, Lateral  Pain Description 1: Aching  Pain Intervention(s) 1: Medication (see MAR)  Last bowel movement, if known:     Constitutional: alert, nad, conversant  Eyes: pupils equal, anicteric  ENMT: no nasal discharge, moist mucous membranes  Cardiovascular:   Respiratory: breathing not labored, symmetric  Gastrointestinal: soft non-tender, +bowel sounds  Musculoskeletal: no deformity, no tenderness to palpation  Skin: warm, dry  Neurologic: following commands, moving all extremities  Psychiatric: full affect, no hallucinations  Other:       HISTORY:     Principal Problem:    Sepsis ([address] (4/29/2018)    Active Problems:    Troponin level elevated (2018)      Past Medical History:   Diagnosis Date    Anemia     secondary to lupus    Asthma     no inhaler use in past 2 to 3 years    Carditis     Chronic kidney disease     ESRD    Chronic pain     DDD (degenerative disc disease), lumbar     [facility]     GERD (gastroesophageal reflux disease)     Heart failure ([facility]     Hemodialysis patient ([facility] 2017    [address]  Tuesday,  Thursday,  and Saturday.  Hypercholesterolemia     Hypertension     Intractable nausea and vomiting 10/21/2015    Long term (current) use of anticoagulants     Lupus     Lupus (systemic lupus erythematosus) (HCC)     Malignant hypertension with chronic kidney disease stage V ([facility]     Peritoneal dialysis status ([facility] 10/2015    x 2 years Stopped 2017 due to infection and removed.  Poor historian 2018    With medications    Thromboembolus ([facility] [facility] 2013    lungs    Transfusion history     Last Transfusion 2017  at [facility]      Past Surgical History:   Procedure Laterality Date    HX  SECTION  11/2006    x1    HX OTHER SURGICAL  9/16/15    INSERTION PD CATH; Removed 2017    HX VASCULAR ACCESS Right 2017    Double-Lumen henry catheter upper chest      Family History   Problem Relation Age of Onset    Diabetes Father     Hypertension Father     Cancer Other      aunt with breast cancer    Diabetes Mother       History reviewed, no pertinent family history.   Social History   Substance Use Topics    Smoking status: Never Smoker    Smokeless tobacco: Never Used    Alcohol use No     Allergies   Allergen Reactions    Compazine [Prochlorperazine Edisylate] Nausea and Vomiting and Palpitations      Current Facility-Administered Medications   Medication Dose Route Frequency    hydrALAZINE (APRESOLINE) 20 mg/mL injection 20 mg  20 mg IntraVENous Q6H PRN    cloNIDine HCl (CATAPRES) tablet 0.1 mg  0.1 mg Oral TID    [START ON 6/26/2018] predniSONE (DELTASONE) tablet 10 mg  10 mg Oral DAILY    azaTHIOprine (IMURAN) tablet 50 mg  50 mg Oral DAILY AFTER BREAKFAST    [START ON 6/26/2018] epoetin pia (EPOGEN;PROCRIT) injection 10,000 Units  10,000 Units SubCUTAneous DIALYSIS TUE, THU & SAT    HYDROmorphone (DILAUDID) syringe 0.5 mg  0.5 mg IntraVENous Q4H PRN    oxyCODONE IR (ROXICODONE) tablet 10 mg  10 mg Oral Q4H PRN    haloperidol lactate (HALDOL) injection 2 mg  2 mg IntraVENous Q4H PRN    apixaban (ELIQUIS) tablet 2.5 mg  2.5 mg Oral BID    alteplase (CATHFLO) 1 mg in sterile water (preservative free) 1 mL injection  1 mg InterCATHeter PRN    sodium chloride (NS) flush 10-30 mL  10-30 mL InterCATHeter PRN    sodium chloride (NS) flush 10 mL  10 mL InterCATHeter Q24H    sodium chloride (NS) flush 10 mL  10 mL InterCATHeter PRN    sodium chloride (NS) flush 10-40 mL  10-40 mL InterCATHeter Q8H    sodium chloride (NS) flush 20 mL  20 mL InterCATHeter Q24H    bacitracin 500 unit/gram packet 1 Packet  1 Packet Topical PRN    lactobac ac& pc-s.therm-b.anim (AJIT Q/RISAQUAD)  1 Cap Oral DAILY    senna (SENOKOT) tablet 8.6 mg  1 Tab Oral QHS    sodium chloride (NS) flush 5-10 mL  5-10 mL IntraVENous Q8H    sodium chloride (NS) flush 5-10 mL  5-10 mL IntraVENous PRN    naloxone (NARCAN) injection 0.4 mg  0.4 mg IntraVENous PRN    docusate sodium (COLACE) capsule 100 mg  100 mg Oral BID    Vancomycin random- 6/20; please draw at the beginning of dialysis   Other DIALYSIS ONCE    vancomycin (VANCOCIN) 500 mg in 0.9% sodium chloride (MBP/ADV) 100 mL  500 mg IntraVENous DIALYSIS TUE, THU & SAT    albuterol-ipratropium (DUO-NEB) 2.5 MG-0.5 MG/3 ML  3 mL Nebulization Q4H PRN    piperacillin-tazobactam (ZOSYN) 3.375 g in 0.9% sodium chloride (MBP/ADV) 100 mL  3.375 g IntraVENous Q12H    Vancomycin Pharmacy Dosing   Other Rx Dosing/Monitoring    allopurinol (ZYLOPRIM) tablet 100 mg  100 mg Oral DAILY AFTER BREAKFAST    amitriptyline (ELAVIL) tablet 25 mg  25 mg Oral QHS PRN    amLODIPine (NORVASC) tablet 10 mg  10 mg Oral DAILY    cyanocobalamin (VITAMIN B12) tablet 2,500 mcg  2,500 mcg Oral DAILY    gemfibrozil (LOPID) tablet 300 mg  300 mg Oral DAILY    hydroxychloroquine (PLAQUENIL) tablet 200 mg  200 mg Oral BID    pantoprazole (PROTONIX) tablet 40 mg  40 mg Oral ACB    senna (SENOKOT) tablet 8.6 mg  1 Tab Oral DAILY PRN    ondansetron (ZOFRAN) injection 4 mg  4 mg IntraVENous Q4H PRN    arformoterol (BROVANA) neb solution 15 mcg  15 mcg Nebulization BID RT    And    budesonide (PULMICORT) 500 mcg/2 ml nebulizer suspension  500 mcg Nebulization BID RT    carvedilol (COREG) tablet 25 mg  25 mg Oral BID WITH MEALS    losartan (COZAAR) tablet 50 mg  50 mg Oral DAILY          LAB AND IMAGING FINDINGS:     Lab Results   Component Value Date/Time    WBC 9.1 06/25/2018 03:47 AM    HGB 7.2 (L) 06/25/2018 03:47 AM    PLATELET 368 99/10/3898 03:47 AM     Lab Results   Component Value Date/Time    Sodium 139 06/25/2018 03:47 AM    Potassium 4.9 06/25/2018 03:47 AM    Chloride 103 06/25/2018 03:47 AM    CO2 26 06/25/2018 03:47 AM    BUN 36 (H) 06/25/2018 03:47 AM    Creatinine 5.18 (H) 06/25/2018 03:47 AM    Calcium 8.7 06/25/2018 03:47 AM    Magnesium 2.0 06/25/2018 03:47 AM    Phosphorus 3.8 06/25/2018 03:47 AM      Lab Results   Component Value Date/Time    AST (SGOT) 23 06/25/2018 03:47 AM    Alk.  phosphatase 60 06/25/2018 03:47 AM    Protein, total 6.4 06/25/2018 03:47 AM    Albumin 1.9 (L) 06/25/2018 03:47 AM    Globulin 4.5 (H) 06/25/2018 03:47 AM     Lab Results   Component Value Date/Time    INR 1.1 05/13/2018 09:51 AM    Prothrombin time 11.6 (H) 05/13/2018 09:51 AM    aPTT 30.7 05/13/2018 09:51 AM      Lab Results   Component Value Date/Time    Iron 34 (L) 06/17/2018 03:00 PM    Iron 34 (L) 06/17/2018 03:00 PM    TIBC 137 (L) 06/17/2018 03:00 PM    Iron % saturation 25 06/17/2018 03:00 PM    Ferritin 4182 (H) 06/17/2018 03:00 PM      No results found for: PH, PCO2, PO2  No components found for: [place]   Lab Results   Component Value Date/Time    CK 25 (L) 06/18/2018 02:10 AM    CK - MB <1.0 06/18/2018 02:10 AM                Total time: 70 minutes  Counseling / coordination time, spent as noted above: 45 minutes  > 50% counseling / coordination?: y    Prolonged service was provided for  []30 min   []75 min in face to face time in the presence of the patient, spent as noted above. Time Start:   Time End:   Note: this can only be billed with 93806 (initial) or 56529 (follow up). If multiple start / stop times, list each separately.

## 2018-06-25 NOTE — PROGRESS NOTES
NAME: Edgar Hurtadoo        :  1985        MRN:  [id number]        Assessment :    Plan:  - End-stage renal disease - TTS - [facility]     SLE    Anemia    PNA/Right paraneumonic loculated effusion-> s/p VATS      --Next HD 18     Clonidine already ordered    Continue EPO. May need PRBCS with next HD    Ct Nepro    May need vascular surgery to evaluate access this week             Subjective:     Chief Complaint: Reports pain \" all over\" last night. BP elevated. Better now. R arm still swollen. Review of Systems:    Symptom Y/N Comments  Symptom Y/N Comments   Fever/Chills    Chest Pain     Poor Appetite    Edema     Cough    Abdominal Pain     Sputum    Joint Pain     SOB/IVY    Pruritis/Rash     Nausea/vomit    Tolerating PT/OT     Diarrhea    Tolerating Diet     Constipation    Other       Could not obtain due to:      Objective:     VITALS:   Last 24hrs VS reviewed since prior progress note. Most recent are:  Visit Vitals    BP (!) 127/93    Pulse 97    Temp 98.7 °F (37.1 °C)    Resp 18    Ht 5' 5\" (1.651 m)    Wt 70.3 kg (154 lb 15.7 oz)    SpO2 95%    BMI 25.79 kg/m2       Intake/Output Summary (Last 24 hours) at 18 1422  Last data filed at 18 0518   Gross per 24 hour   Intake              480 ml   Output               35 ml   Net              445 ml      Telemetry Reviewed:     PHYSICAL EXAM:  General: NAD  CTA  Right chest tube  No LE edema.  RUE edema      Lab Data Reviewed: (see below)    Medications Reviewed: (see below)    PMH/SH reviewed - no change compared to H&P  ________________________________________________________________________  Care Plan discussed with:  Patient Y    Family      RN     Care Manager                    Consultant:          Comments   >50% of visit spent in counseling and coordination of care ________________________________________________________________________  Dino Rodriguez MD     Procedures: see electronic medical records for all procedures/Xrays and details which  were not copied into this note but were reviewed prior to creation of Plan. LABS:  Recent Labs      06/25/18 0347 06/24/18 0346   WBC  9.1  5.8   HGB  7.2*  7.1*   HCT  24.1*  24.4*   PLT  199  161     Recent Labs      06/25/18 0347 06/24/18 0346   NA  139  140   K  4.9  4.6   CL  103  105   CO2  26  29   BUN  36*  27*   CREA  5.18*  3.91*   GLU  89  85   CA  8.7  8.2*   MG  2.0  2.0   PHOS  3.8  3.2     Recent Labs      06/25/18 0347 06/24/18 0346   SGOT  23  13*   AP  60  55   TP  6.4  5.9*   ALB  1.9*  1.7*   GLOB  4.5*  4.2*     No results for input(s): INR, PTP, APTT in the last 72 hours. No lab exists for component: INREXT, INREXT   No results for input(s): FE, TIBC, PSAT, FERR in the last 72 hours. Lab Results   Component Value Date/Time    Folate 13.7 06/17/2018 03:00 PM      No results for input(s): PH, PCO2, PO2 in the last 72 hours. No results for input(s): CPK, CKMB in the last 72 hours.     No lab exists for component: TROPONINI  No components found for: [place]  Lab Results   Component Value Date/Time    Color YELLOW/STRAW 08/12/2016 09:06 PM    Appearance CLEAR 08/12/2016 09:06 PM    Specific gravity 1.017 08/12/2016 09:06 PM    Specific gravity 1.010 07/11/2016 12:44 PM    pH (UA) 7.0 08/12/2016 09:06 PM    Protein 300 (A) 08/12/2016 09:06 PM    Glucose NEGATIVE  08/12/2016 09:06 PM    Ketone TRACE (A) 08/12/2016 09:06 PM    Bilirubin NEGATIVE  07/11/2016 12:44 PM    Urobilinogen 1.0 08/12/2016 09:06 PM    Nitrites NEGATIVE  08/12/2016 09:06 PM    Leukocyte Esterase NEGATIVE  08/12/2016 09:06 PM    Epithelial cells MODERATE (A) 08/12/2016 09:06 PM    Bacteria 1+ (A) 08/12/2016 09:06 PM    WBC 0-4 08/12/2016 09:06 PM    RBC 0-5 08/12/2016 09:06 PM       MEDICATIONS:  Current Facility-Administered Medications   Medication Dose Route Frequency    hydrALAZINE (APRESOLINE) 20 mg/mL injection 20 mg  20 mg IntraVENous Q6H PRN    cloNIDine HCl (CATAPRES) tablet 0.1 mg  0.1 mg Oral TID    [START ON 6/26/2018] predniSONE (DELTASONE) tablet 10 mg  10 mg Oral DAILY    azaTHIOprine (IMURAN) tablet 50 mg  50 mg Oral DAILY AFTER BREAKFAST    apixaban (ELIQUIS) tablet 2.5 mg  2.5 mg Oral BID    alteplase (CATHFLO) 1 mg in sterile water (preservative free) 1 mL injection  1 mg InterCATHeter PRN    sodium chloride (NS) flush 10-30 mL  10-30 mL InterCATHeter PRN    sodium chloride (NS) flush 10 mL  10 mL InterCATHeter Q24H    sodium chloride (NS) flush 10 mL  10 mL InterCATHeter PRN    sodium chloride (NS) flush 10-40 mL  10-40 mL InterCATHeter Q8H    sodium chloride (NS) flush 20 mL  20 mL InterCATHeter Q24H    bacitracin 500 unit/gram packet 1 Packet  1 Packet Topical PRN    lactobac ac& pc-s.therm-b.anim (AJIT Q/RISAQUAD)  1 Cap Oral DAILY    senna (SENOKOT) tablet 8.6 mg  1 Tab Oral QHS    sodium chloride (NS) flush 5-10 mL  5-10 mL IntraVENous Q8H    sodium chloride (NS) flush 5-10 mL  5-10 mL IntraVENous PRN    oxyCODONE-acetaminophen (PERCOCET) 5-325 mg per tablet 2 Tab  2 Tab Oral Q4H PRN    fentaNYL citrate (PF) injection 50 mcg  50 mcg IntraVENous Q2H PRN    naloxone (NARCAN) injection 0.4 mg  0.4 mg IntraVENous PRN    docusate sodium (COLACE) capsule 100 mg  100 mg Oral BID    Vancomycin random- 6/20; please draw at the beginning of dialysis   Other DIALYSIS ONCE    epoetin pia (EPOGEN;PROCRIT) injection 8,000 Units  8,000 Units SubCUTAneous DIALYSIS TUE, THU & SAT    vancomycin (VANCOCIN) 500 mg in 0.9% sodium chloride (MBP/ADV) 100 mL  500 mg IntraVENous DIALYSIS TUE, THU & SAT    albuterol-ipratropium (DUO-NEB) 2.5 MG-0.5 MG/3 ML  3 mL Nebulization Q4H PRN    piperacillin-tazobactam (ZOSYN) 3.375 g in 0.9% sodium chloride (MBP/ADV) 100 mL  3.375 g IntraVENous Q12H    Vancomycin Pharmacy Dosing   Other Rx Dosing/Monitoring    allopurinol (ZYLOPRIM) tablet 100 mg  100 mg Oral DAILY AFTER BREAKFAST    amitriptyline (ELAVIL) tablet 25 mg  25 mg Oral QHS PRN    amLODIPine (NORVASC) tablet 10 mg  10 mg Oral DAILY    cyanocobalamin (VITAMIN B12) tablet 2,500 mcg  2,500 mcg Oral DAILY    gemfibrozil (LOPID) tablet 300 mg  300 mg Oral DAILY    hydroxychloroquine (PLAQUENIL) tablet 200 mg  200 mg Oral BID    pantoprazole (PROTONIX) tablet 40 mg  40 mg Oral ACB    senna (SENOKOT) tablet 8.6 mg  1 Tab Oral DAILY PRN    ondansetron (ZOFRAN) injection 4 mg  4 mg IntraVENous Q4H PRN    arformoterol (BROVANA) neb solution 15 mcg  15 mcg Nebulization BID RT    And    budesonide (PULMICORT) 500 mcg/2 ml nebulizer suspension  500 mcg Nebulization BID RT    carvedilol (COREG) tablet 25 mg  25 mg Oral BID WITH MEALS    losartan (COZAAR) tablet 50 mg  50 mg Oral DAILY

## 2018-06-25 NOTE — PROGRESS NOTES
Problem: Falls - Risk of  Goal: *Absence of Falls  Document Alex Fall Risk and appropriate interventions in the flowsheet. Outcome: Progressing Towards Goal  Fall Risk Interventions:  Mobility Interventions: Patient to call before getting OOB         Medication Interventions: Patient to call before getting OOB, Teach patient to arise slowly    Elimination Interventions: Call light in reach    History of Falls Interventions: Door open when patient unattended        Problem: Pressure Injury - Risk of  Goal: *Prevention of pressure injury  Document Vikash Scale and appropriate interventions in the flowsheet. Outcome: Progressing Towards Goal  Pressure Injury Interventions:             Activity Interventions: Increase time out of bed, Pressure redistribution bed/mattress(bed type)    Mobility Interventions: Pressure redistribution bed/mattress (bed type), Float heels    Nutrition Interventions: Document food/fluid/supplement intake

## 2018-06-25 NOTE — PROGRESS NOTES
Spiritual Care Assessment/Progress Note  [facility]      NAME: Pro Pope      MRN: [id number]  AGE: 29 y.o.  SEX: female  Zoroastrian Affiliation: Scientologist   Language: English     6/25/2018     Total Time (in minutes): 5     Spiritual Assessment begun in [facility] 4 SURG/BARIATRICS through conversation with:         [x]Patient        [] Family    [] Friend(s)        Reason for Consult: Palliative Care, Initial/Spiritual Assessment     Spiritual beliefs: (Please include comment if needed)     [x] Identifies with a prince tradition:         [] Supported by a prince community:            [] Claims no spiritual orientation:           [] Seeking spiritual identity:                [] Adheres to an individual form of spirituality:           [] Not able to assess:                           Identified resources for coping:      [x] Prayer                               [] Music                  [] Guided Imagery     [x] Family/friends                 [] Pet visits     [] Devotional reading                         [] Unknown     [] Other:                                               Interventions offered during this visit: (See comments for more details)    Patient Interventions: Affirmation of emotions/emotional suffering, Affirmation of prince, Initial/Spiritual assessment, patient floor, Normalization of emotional/spiritual concerns, Prayer (assurance of)           Plan of Care:     [] Support spiritual and/or cultural needs    [] Support AMD and/or advance care planning process      [] Support grieving process   [] Coordinate Rites and/or Rituals    [] Coordination with community clergy   [] No spiritual needs identified at this time   [] Detailed Plan of Care below (See Comments)  [] Make referral to Music Therapy  [] Make referral to Pet Therapy     [] Make referral to Addiction services  [] Make referral to [facility]  [] Make referral to Spiritual Care Partner  [] No future visits requested        [x] Follow up visits as needed     Comments:  visit per palliative consult. Pt was sitting up in bed, she mentioned that she had a really rough night not being able to sleep and in pain. She was hoping to get some rest today. We talked about her mother and daughter Nadya Barrientos, that give her support.  provided pastoral listening, support and continued prayer. Let her know of  availability.  follow up as needed.      Jhon Ferrell, [facility]   [phone])

## 2018-06-26 ENCOUNTER — APPOINTMENT (OUTPATIENT)
Dept: GENERAL RADIOLOGY | Age: 33
DRG: 853 | End: 2018-06-26
Attending: NURSE PRACTITIONER
Payer: MEDICARE

## 2018-06-26 LAB
ALBUMIN SERPL-MCNC: 1.8 G/DL (ref 3.5–5)
ALBUMIN/GLOB SERPL: 0.4 {RATIO} (ref 1.1–2.2)
ALP SERPL-CCNC: 78 U/L (ref 45–117)
ALT SERPL-CCNC: 9 U/L (ref 12–78)
ANION GAP SERPL CALC-SCNC: 10 MMOL/L (ref 5–15)
AST SERPL-CCNC: 16 U/L (ref 15–37)
BILIRUB SERPL-MCNC: 0.4 MG/DL (ref 0.2–1)
BUN SERPL-MCNC: 45 MG/DL (ref 6–20)
BUN/CREAT SERPL: 7 (ref 12–20)
CALCIUM SERPL-MCNC: 9.2 MG/DL (ref 8.5–10.1)
CHLORIDE SERPL-SCNC: 102 MMOL/L (ref 97–108)
CO2 SERPL-SCNC: 27 MMOL/L (ref 21–32)
CREAT SERPL-MCNC: 6.46 MG/DL (ref 0.55–1.02)
DATE LAST DOSE: ABNORMAL
ERYTHROCYTE [DISTWIDTH] IN BLOOD BY AUTOMATED COUNT: 19.4 % (ref 11.5–14.5)
GLOBULIN SER CALC-MCNC: 4.6 G/DL (ref 2–4)
GLUCOSE SERPL-MCNC: 87 MG/DL (ref 65–100)
HCT VFR BLD AUTO: 23 % (ref 35–47)
HGB BLD-MCNC: 6.9 G/DL (ref 11.5–16)
MAGNESIUM SERPL-MCNC: 2.2 MG/DL (ref 1.6–2.4)
MCH RBC QN AUTO: 26.7 PG (ref 26–34)
MCHC RBC AUTO-ENTMCNC: 30 G/DL (ref 30–36.5)
MCV RBC AUTO: 89.1 FL (ref 80–99)
NRBC # BLD: 0 K/UL (ref 0–0.01)
NRBC BLD-RTO: 0 PER 100 WBC
PHOSPHATE SERPL-MCNC: 5.1 MG/DL (ref 2.6–4.7)
PLATELET # BLD AUTO: 214 K/UL (ref 150–400)
PMV BLD AUTO: 9.9 FL (ref 8.9–12.9)
POTASSIUM SERPL-SCNC: 5.2 MMOL/L (ref 3.5–5.1)
PROT SERPL-MCNC: 6.4 G/DL (ref 6.4–8.2)
RBC # BLD AUTO: 2.58 M/UL (ref 3.8–5.2)
REPORTED DOSE,DOSE: ABNORMAL UNITS
REPORTED DOSE/TIME,TMG: ABNORMAL
SODIUM SERPL-SCNC: 139 MMOL/L (ref 136–145)
VANCOMYCIN TROUGH SERPL-MCNC: 11.6 UG/ML (ref 5–10)
WBC # BLD AUTO: 7.1 K/UL (ref 3.6–11)

## 2018-06-26 PROCEDURE — 86900 BLOOD TYPING SEROLOGIC ABO: CPT | Performed by: HOSPITALIST

## 2018-06-26 PROCEDURE — 74011250637 HC RX REV CODE- 250/637: Performed by: SPECIALIST

## 2018-06-26 PROCEDURE — 36430 TRANSFUSION BLD/BLD COMPNT: CPT

## 2018-06-26 PROCEDURE — 83735 ASSAY OF MAGNESIUM: CPT | Performed by: INTERNAL MEDICINE

## 2018-06-26 PROCEDURE — 74011000250 HC RX REV CODE- 250: Performed by: INTERNAL MEDICINE

## 2018-06-26 PROCEDURE — 80053 COMPREHEN METABOLIC PANEL: CPT | Performed by: INTERNAL MEDICINE

## 2018-06-26 PROCEDURE — 74011250637 HC RX REV CODE- 250/637: Performed by: INTERNAL MEDICINE

## 2018-06-26 PROCEDURE — 30233N1 TRANSFUSION OF NONAUTOLOGOUS RED BLOOD CELLS INTO PERIPHERAL VEIN, PERCUTANEOUS APPROACH: ICD-10-PCS | Performed by: HOSPITALIST

## 2018-06-26 PROCEDURE — 71045 X-RAY EXAM CHEST 1 VIEW: CPT

## 2018-06-26 PROCEDURE — 85027 COMPLETE CBC AUTOMATED: CPT | Performed by: INTERNAL MEDICINE

## 2018-06-26 PROCEDURE — 74011000258 HC RX REV CODE- 258: Performed by: INTERNAL MEDICINE

## 2018-06-26 PROCEDURE — 74011636637 HC RX REV CODE- 636/637: Performed by: HOSPITALIST

## 2018-06-26 PROCEDURE — 74011250636 HC RX REV CODE- 250/636: Performed by: HOSPITALIST

## 2018-06-26 PROCEDURE — P9016 RBC LEUKOCYTES REDUCED: HCPCS | Performed by: HOSPITALIST

## 2018-06-26 PROCEDURE — 36415 COLL VENOUS BLD VENIPUNCTURE: CPT | Performed by: INTERNAL MEDICINE

## 2018-06-26 PROCEDURE — 74011250637 HC RX REV CODE- 250/637: Performed by: THORACIC SURGERY (CARDIOTHORACIC VASCULAR SURGERY)

## 2018-06-26 PROCEDURE — 74011250636 HC RX REV CODE- 250/636: Performed by: NURSE PRACTITIONER

## 2018-06-26 PROCEDURE — 74011250637 HC RX REV CODE- 250/637: Performed by: HOSPITALIST

## 2018-06-26 PROCEDURE — 94640 AIRWAY INHALATION TREATMENT: CPT

## 2018-06-26 PROCEDURE — 80202 ASSAY OF VANCOMYCIN: CPT | Performed by: INTERNAL MEDICINE

## 2018-06-26 PROCEDURE — 90935 HEMODIALYSIS ONE EVALUATION: CPT

## 2018-06-26 PROCEDURE — 65660000000 HC RM CCU STEPDOWN

## 2018-06-26 PROCEDURE — 74011250636 HC RX REV CODE- 250/636: Performed by: INTERNAL MEDICINE

## 2018-06-26 PROCEDURE — 86923 COMPATIBILITY TEST ELECTRIC: CPT | Performed by: HOSPITALIST

## 2018-06-26 PROCEDURE — 84100 ASSAY OF PHOSPHORUS: CPT | Performed by: INTERNAL MEDICINE

## 2018-06-26 PROCEDURE — 74011250637 HC RX REV CODE- 250/637: Performed by: NURSE PRACTITIONER

## 2018-06-26 RX ORDER — SODIUM CHLORIDE 9 MG/ML
250 INJECTION, SOLUTION INTRAVENOUS AS NEEDED
Status: DISCONTINUED | OUTPATIENT
Start: 2018-06-26 | End: 2018-06-28 | Stop reason: HOSPADM

## 2018-06-26 RX ADMIN — HYDROMORPHONE HYDROCHLORIDE 0.5 MG: 1 INJECTION, SOLUTION INTRAMUSCULAR; INTRAVENOUS; SUBCUTANEOUS at 10:00

## 2018-06-26 RX ADMIN — OXYCODONE HYDROCHLORIDE 10 MG: 5 TABLET ORAL at 03:34

## 2018-06-26 RX ADMIN — Medication 10 ML: at 22:39

## 2018-06-26 RX ADMIN — HYDROMORPHONE HYDROCHLORIDE 0.5 MG: 1 INJECTION, SOLUTION INTRAMUSCULAR; INTRAVENOUS; SUBCUTANEOUS at 05:45

## 2018-06-26 RX ADMIN — CLONIDINE HYDROCHLORIDE 0.1 MG: 0.1 TABLET ORAL at 22:38

## 2018-06-26 RX ADMIN — Medication 2500 MCG: at 13:05

## 2018-06-26 RX ADMIN — EPOETIN ALFA 10000 UNITS: 10000 SOLUTION INTRAVENOUS; SUBCUTANEOUS at 22:41

## 2018-06-26 RX ADMIN — PIPERACILLIN SODIUM,TAZOBACTAM SODIUM 3.38 G: 3; .375 INJECTION, POWDER, FOR SOLUTION INTRAVENOUS at 13:06

## 2018-06-26 RX ADMIN — Medication 1 CAPSULE: at 10:00

## 2018-06-26 RX ADMIN — CARVEDILOL 25 MG: 12.5 TABLET, FILM COATED ORAL at 13:05

## 2018-06-26 RX ADMIN — HYDROXYCHLOROQUINE SULFATE 200 MG: 200 TABLET, FILM COATED ORAL at 10:01

## 2018-06-26 RX ADMIN — HYDROMORPHONE HYDROCHLORIDE 0.5 MG: 1 INJECTION, SOLUTION INTRAMUSCULAR; INTRAVENOUS; SUBCUTANEOUS at 22:38

## 2018-06-26 RX ADMIN — PREDNISONE 10 MG: 10 TABLET ORAL at 10:01

## 2018-06-26 RX ADMIN — DOCUSATE SODIUM 100 MG: 100 CAPSULE, LIQUID FILLED ORAL at 10:00

## 2018-06-26 RX ADMIN — HYDROXYCHLOROQUINE SULFATE 200 MG: 200 TABLET, FILM COATED ORAL at 17:09

## 2018-06-26 RX ADMIN — LOSARTAN POTASSIUM 50 MG: 50 TABLET ORAL at 13:05

## 2018-06-26 RX ADMIN — HYDROMORPHONE HYDROCHLORIDE 0.5 MG: 1 INJECTION, SOLUTION INTRAMUSCULAR; INTRAVENOUS; SUBCUTANEOUS at 18:16

## 2018-06-26 RX ADMIN — AMITRIPTYLINE HYDROCHLORIDE 25 MG: 50 TABLET, FILM COATED ORAL at 22:38

## 2018-06-26 RX ADMIN — Medication 10 ML: at 13:24

## 2018-06-26 RX ADMIN — ALLOPURINOL 100 MG: 100 TABLET ORAL at 10:01

## 2018-06-26 RX ADMIN — CLONIDINE HYDROCHLORIDE 0.1 MG: 0.1 TABLET ORAL at 16:07

## 2018-06-26 RX ADMIN — APIXABAN 2.5 MG: 2.5 TABLET, FILM COATED ORAL at 10:01

## 2018-06-26 RX ADMIN — CARVEDILOL 25 MG: 12.5 TABLET, FILM COATED ORAL at 17:09

## 2018-06-26 RX ADMIN — APIXABAN 2.5 MG: 2.5 TABLET, FILM COATED ORAL at 17:10

## 2018-06-26 RX ADMIN — BUDESONIDE 500 MCG: 0.5 INHALANT RESPIRATORY (INHALATION) at 20:12

## 2018-06-26 RX ADMIN — Medication 10 ML: at 05:47

## 2018-06-26 RX ADMIN — Medication 10 ML: at 18:16

## 2018-06-26 RX ADMIN — Medication 10 ML: at 06:42

## 2018-06-26 RX ADMIN — Medication 20 ML: at 06:42

## 2018-06-26 RX ADMIN — PIPERACILLIN SODIUM,TAZOBACTAM SODIUM 3.38 G: 3; .375 INJECTION, POWDER, FOR SOLUTION INTRAVENOUS at 22:38

## 2018-06-26 RX ADMIN — Medication 10 ML: at 05:46

## 2018-06-26 RX ADMIN — BUDESONIDE 500 MCG: 0.5 INHALANT RESPIRATORY (INHALATION) at 08:04

## 2018-06-26 RX ADMIN — GEMFIBROZIL 300 MG: 600 TABLET ORAL at 10:01

## 2018-06-26 RX ADMIN — AZATHIOPRINE 50 MG: 50 TABLET ORAL at 13:12

## 2018-06-26 RX ADMIN — ARFORMOTEROL TARTRATE 15 MCG: 15 SOLUTION RESPIRATORY (INHALATION) at 08:04

## 2018-06-26 RX ADMIN — HYDROMORPHONE HYDROCHLORIDE 0.5 MG: 1 INJECTION, SOLUTION INTRAMUSCULAR; INTRAVENOUS; SUBCUTANEOUS at 13:57

## 2018-06-26 RX ADMIN — ARFORMOTEROL TARTRATE 15 MCG: 15 SOLUTION RESPIRATORY (INHALATION) at 20:12

## 2018-06-26 RX ADMIN — AMLODIPINE BESYLATE 10 MG: 5 TABLET ORAL at 13:05

## 2018-06-26 RX ADMIN — CLONIDINE HYDROCHLORIDE 0.1 MG: 0.1 TABLET ORAL at 13:05

## 2018-06-26 RX ADMIN — PANTOPRAZOLE SODIUM 40 MG: 40 TABLET, DELAYED RELEASE ORAL at 06:41

## 2018-06-26 RX ADMIN — DOCUSATE SODIUM 100 MG: 100 CAPSULE, LIQUID FILLED ORAL at 17:09

## 2018-06-26 NOTE — PROGRESS NOTES
Day #10 of Vancomycin  Indication: HCAP/GPC bacteremia (S/P VATS procedure )  Current regimen:  500 mg IV T,T,S with HD   Abx regimen:  Vanc + Zosyn  ID Following ?: YES  Inpatient dialysis schedule: TTS    Recent Labs      18   0344  18   0347  18   0346   WBC  7.1  9.1  5.8   CREA  6.46*  5.18*  3.91*   BUN  45*  36*  27*     Est CrCl: ESRD on HD  Temp (24hrs), Av.8 °F (37.1 °C), Min:98.2 °F (36.8 °C), Max:99.4 °F (37.4 °C)    Cultures:    blood: Alpha Strep (1/2 bottles), final   blood: NG, final   MRSA screen: negative   pleural fluid: NGTD - final    Goal trough = 20-25 mcg/mL for therapeutic goal 15-20 mcg/mL (assuming ~35% removal by dialysis)    Date                Dialysis (Yes/No)       Pre-HD Level              Dose    N   --   1.5 grams                  N                                --                                    --    Y   --   500 mg    Y (2 hour session) 19.7   500 mg     Y   --   500 mg    N   --   --                   Y                                 --                                 500 mg                    N                                 --                                 --                   N                                 --                                 --                   Y                                 11.6                       750 mg planned    Plan: Continue current regimen. Next HD on planned for today; change dose to 750 mg given pre-HD level below target range. Ordering pre-HD trough level and entering staff message to ensure dose given/HD completed.     Vancomycin Process in Hemodialysis

## 2018-06-26 NOTE — PROGRESS NOTES
Bedside shift change report given to Trista Sutton (oncoming nurse) by Angel Luis Wren (offgoing nurse). Report included the following information SBAR, Kardex, OR Summary, Intake/Output, MAR, Accordion, Recent Results, Med Rec Status and Cardiac Rhythm NSR.

## 2018-06-26 NOTE — DIALYSIS
[facility]  [phone]    Vitals   Pre   Post   Assessment   Pre   Post     Temp  Temp: 99.4 °F (37.4 °C) (06/26/18 0859)  98.5 LOC  Alert and oriented x3 Alert and oriented x3      HR   Pulse (Heart Rate): 86 (06/26/18 0859) 95 Lungs   course    course      B/P   BP: 133/88 (06/26/18 0859) 156/108 Cardiac   Reg rate and rythm    Reg rate and rythm      Resp   Resp Rate: 17 (06/26/18 0727) 17 Skin   Warm and dry    Warm and dry      Pain level  Pain Intensity 1: 8 (06/26/18 0335) 0 Edema  General General      Orders:    Duration:   Start:    8161 End:    1257 Total:   3.5   Dialyzer:   Dialyzer/Set Up Inspection: Revaclear (06/26/18 0859)   Kandice Duron Bath:   Dialysate K (mEq/L): 3 (06/26/18 0859)   Ca Bath:   Dialysate CA (mEq/L): 2.5 (06/26/18 0859)   Na/Bicarb:   Dialysate NA (mEq/L): 140 (06/26/18 0859)   Target Fluid Removal:   2000   Access     Type & Location:   AVF PETER   Labs     Obtained/Reviewed   Critical Results Called   Date when labs were drawn-  Hgb-    HGB   Date Value Ref Range Status   06/26/2018 6.9 (L) 11.5 - 16.0 g/dL Final     Comment:     RESULTS REPEATED     K-    Potassium   Date Value Ref Range Status   06/26/2018 5.2 (H) 3.5 - 5.1 mmol/L Final     Ca-   Calcium   Date Value Ref Range Status   06/26/2018 9.2 8.5 - 10.1 MG/DL Final     Bun-   BUN   Date Value Ref Range Status   06/26/2018 45 (H) 6 - 20 MG/DL Final     Creat-   Creatinine   Date Value Ref Range Status   06/26/2018 6.46 (H) 0.55 - 1.02 MG/DL Final        Medications/ Blood Products Given     Name   Dose   Route and Time                     Blood Volume Processed (BVP):    71 L Net Fluid   Removed:  2500 mL   Comments   Time Out Done: 5655  Primary Nurse Rpt Pre:Michael Boyce  Primary Nurse Rpt Post:Michael Boyce  Pt Education:procedure, pain, sxs of infection  Care Plan: cont the orders of nephrologist  Tx Summary: assess fistula site for positive thrill and bruit, site swollen and bruised, clean with alcohol prep pad, cannulate with x2 with 15 ga needles, aspirate and flush well, begin tx. Pt tolerated tx well with no complaints. decannulated x2 and applied pressure to both sites for approx 10 min each till hemostasis. Covered with tape and 2x2 guaze. Positive thrill and bruit . Admiting Diagnosis:  Pt's previous clinic-[facility]  Consent signed - Informed Consent Verified: Yes (06/26/18 7121)  [facility] Consent - sighned  Hepatitis Status- negative 6/24/18  Machine #- Machine Number: [id number] (06/26/18 1520)  Telemetry status-bedside  Pre-dialysis wt. - Pre-Dialysis Weight: 70.7 kg (155 lb 13.8 oz) (06/26/18 0859)

## 2018-06-26 NOTE — PROGRESS NOTES
Bedside shift change report given to Deneen Bender RN (oncoming nurse) by Gabino Benson RN (offgoing nurse). Report included the following information SBAR, Intake/Output, MAR and Recent Results.

## 2018-06-26 NOTE — PROGRESS NOTES
Hospitalist Progress Note  Catrina Felty, MD  Answering service: [phone] or [phone] from in house phone        Date of Service:  2018  NAME:  Alex Hemphill  :  1985  MRN:  [id number]      Admission Summary: This is a 43-year-old woman with a past medical history significant for end-stage renal disease on hemodialysis, lupus, asthma, thromboembolism, dyslipidemia, was in her usual state of health until the day of presentation at the emergency room when the patient developed shortness of breath. The shortness of breath is progressive, it is with little or no activity. Patient had routine dialysis done. After the dialysis, that is when the patient developed the shortness of breath. Patient used to be on peritoneal dialysis, but was converted to hemodialysis because of recurrent infection. The patient was last admitted to this hospital from 2018 to 2018. She was admitted and treated for sepsis, attributed to multiple abdominal abscesses. Patient underwent drainage of the abdominal abscess. Patient also underwent drainage of a right pleural effusion. She was discharged home to complete a course of Augmentin. She stated that she has been having shortness of breath on and off since she was discharged from hospital.  A day before coming to the emergency room here, she was seen at another emergency room. Patient was evaluated, but was not admitted. When the patient arrived at the emergency room, she was found to have elevated lactic acid level, low grade temperature, tachycardia. Code sepsis was called. The chest x-ray shows evidence of pneumonia Similar presentation last admission as well    Interval history / Subjective:   Seen during dialysis. No complaints. Hb 6.9,she needs at least one unit of pRBC.      Assessment & Plan:     Health care associated Pneumonia with partially loculated pleural effusion (?recurrent)  -CXR 6/17 Unchanged mild right pleural effusion with patchy opacification right base  -Vanc/Zosyn  -Follow cultures so far pending  -CT chest/abd 6/17 The pleural fluid on the right extends more superiorly in the right hemithorax and there is a more focal collection laterally measuring 5.4 cm may be partially loculated.  -Last admission patient had US guided removal with chest tube placed  -Consult thoracic surgery- s/p VATS on 6/21 CT tube in place draining to suction. Repeat chest xray today will follow up with thoracic surgery recommendation there was a small PTX   - abx MGMT per ID on vanc / zosyn day 9 may stop soon cultures NGTD from 6/21    Abdominal cavity abscess (last admission)  -CT abd 6/17 Pigtail catheter overlies collection in the pelvis anteriorly which has diminished considerably in size since 5/16/2018 and there is a small amount of residual fluid noted    Suspected sepsis  -as above  -blood culture 6/16 alpha Strep in  1/2 bottles  -Repeat blood culture 6/18  Neg so far   - other body fluid cultures NGTD 6/21    ESRD  -on HD (TTS)  -Appreciate Nephrology    Hyperkalemia: rx with dialysis    Anemia  -likely of chronic disease  -on EPO   -Hb 6. 9. Transfuse one unit 6/26    Lupus  -Outpatient being followed by Dr Dionne Nair    -d/w Dr. Marli Green will increase prednisone to 10 for now and see how she does also can start Imuran but will take few days to kick in    - On prednisone and Plaquenil    Chronic DVT  -on Eliquis resumed after surgery    Elevated troponin  -Likely from ESRD  - Cardiology consulted  -Echo EF 60-65% with no RWMA    Dyslipidemia  -continue home meds    Diet  Renal diet    Hypertension: uncontrolled  Increased Clonidine/ on Hydralazine need volume removal after HD and need pain control  Pall care consulted for pain control    Code status: Full  DVT prophylaxis: Eliquis  PTA: home    Plan: Continue antibiotics, follow cultures, ID and thoracic surgery recommendation    Care Plan discussed with: Patient/Family  Disposition: TBD [place] Problems  Date Reviewed: 6/21/2018          Codes Class Noted POA    Troponin level elevated ICD-10-CM: R74.8  ICD-9-CM: 790.6  6/17/2018 Unknown        * (Principal)Sepsis ([address] ICD-10-CM: A41.9  ICD-9-CM: 038.9, 995.91  4/29/2018 Yes                Review of Systems:   A comprehensive review of systems was negative except for that written in the HPI. Vital Signs:    Last 24hrs VS reviewed since prior progress note. Most recent are:  Visit Vitals    /88    Pulse 86    Temp 99.4 °F (37.4 °C) (Oral)    Resp 17    Ht 5' 5\" (1.651 m)    Wt 70.7 kg (155 lb 13.8 oz)    SpO2 95%    BMI 25.94 kg/m2         Intake/Output Summary (Last 24 hours) at 06/26/18 0864  Last data filed at 06/26/18 0335   Gross per 24 hour   Intake              300 ml   Output              165 ml   Net              135 ml        Physical Examination:             Constitutional:  No acute distress, cooperative, pleasant    ENT:  Oral mucous moist, oropharynx benign. Neck supple,    Resp:  Rt sided crackles up to mid lung CT tube in place left lung CTA   CV:  Regular rhythm, normal rate, no murmurs, gallops, rubs    GI:  Soft, non distended, non tender. normoactive bowel sounds, no hepatosplenomegaly, drain in place    Musculoskeletal:  Trace edema, warm, 2+ pulses throughout,Right AV fistula in place    Neurologic:  Moves all extremities.   AAOx3, CN II-XII reviewed     Skin:  Good turgor, no rashes or ulcers       Data Review:    Review and/or order of clinical lab test      Labs:     Recent Labs      06/26/18   0344  06/25/18 0347   WBC  7.1  9.1   HGB  6.9*  7.2*   HCT  23.0*  24.1*   PLT  214  199     Recent Labs      06/26/18   0344  06/25/18   0347 06/24/18   0346   NA  139  139  140   K  5.2*  4.9  4.6   CL  102  103  105   CO2  27  26  29   BUN  45*  36*  27*   CREA  6.46*  5.18*  3.91*   GLU  87  89  85   CA  9.2  8.7  8.2*   MG 2.2  2.0  2.0   PHOS  5.1*  3.8  3.2     Recent Labs      06/26/18   0344  06/25/18   0347  06/24/18   0346   SGOT  16  23  13*   ALT  9*  8*  7*   AP  78  60  55   TBILI  0.4  0.4  0.3   TP  6.4  6.4  5.9*   ALB  1.8*  1.9*  1.7*   GLOB  4.6*  4.5*  4.2*     No results for input(s): INR, PTP, APTT in the last 72 hours. No lab exists for component: INREXT, INREXT   No results for input(s): FE, TIBC, PSAT, FERR in the last 72 hours. Lab Results   Component Value Date/Time    Folate 13.7 06/17/2018 03:00 PM      No results for input(s): PH, PCO2, PO2 in the last 72 hours. No results for input(s): CPK, CKNDX, TROIQ in the last 72 hours.     No lab exists for component: CPKMB  Lab Results   Component Value Date/Time    Cholesterol, total 117 09/25/2015 02:02 PM    HDL Cholesterol 31 (L) 09/25/2015 02:02 PM    LDL, calculated 57 09/25/2015 02:02 PM    Triglyceride 146 09/25/2015 02:02 PM    CHOL/HDL Ratio 7.7 (H) 05/31/2009 10:30 AM     Lab Results   Component Value Date/Time    Glucose (POC) 123 (H) 04/20/2018 05:33 PM    Glucose (POC) 95 04/20/2018 11:19 AM    Glucose (POC) 100 04/20/2018 07:18 AM    Glucose (POC) 119 (H) 04/19/2018 09:11 PM    Glucose (POC) 86 04/19/2018 04:30 PM     Lab Results   Component Value Date/Time    Color YELLOW/STRAW 08/12/2016 09:06 PM    Appearance CLEAR 08/12/2016 09:06 PM    Specific gravity 1.017 08/12/2016 09:06 PM    Specific gravity 1.010 07/11/2016 12:44 PM    pH (UA) 7.0 08/12/2016 09:06 PM    Protein 300 (A) 08/12/2016 09:06 PM    Glucose NEGATIVE  08/12/2016 09:06 PM    Ketone TRACE (A) 08/12/2016 09:06 PM    Bilirubin NEGATIVE  07/11/2016 12:44 PM    Urobilinogen 1.0 08/12/2016 09:06 PM    Nitrites NEGATIVE  08/12/2016 09:06 PM    Leukocyte Esterase NEGATIVE  08/12/2016 09:06 PM    Epithelial cells MODERATE (A) 08/12/2016 09:06 PM    Bacteria 1+ (A) 08/12/2016 09:06 PM    WBC 0-4 08/12/2016 09:06 PM    RBC 0-5 08/12/2016 09:06 PM         Medications Reviewed:     Current Facility-Administered Medications   Medication Dose Route Frequency    Vancomycin random level 6/26 please draw at beginning of HD session   Other ONCE    0.9% sodium chloride infusion 250 mL  250 mL IntraVENous PRN    hydrALAZINE (APRESOLINE) 20 mg/mL injection 20 mg  20 mg IntraVENous Q6H PRN    cloNIDine HCl (CATAPRES) tablet 0.1 mg  0.1 mg Oral TID    predniSONE (DELTASONE) tablet 10 mg  10 mg Oral DAILY    azaTHIOprine (IMURAN) tablet 50 mg  50 mg Oral DAILY AFTER BREAKFAST    epoetin pia (EPOGEN;PROCRIT) injection 10,000 Units  10,000 Units SubCUTAneous DIALYSIS TUE, THU & SAT    HYDROmorphone (DILAUDID) syringe 0.5 mg  0.5 mg IntraVENous Q4H PRN    oxyCODONE IR (ROXICODONE) tablet 10 mg  10 mg Oral Q4H PRN    haloperidol lactate (HALDOL) injection 2 mg  2 mg IntraVENous Q4H PRN    apixaban (ELIQUIS) tablet 2.5 mg  2.5 mg Oral BID    alteplase (CATHFLO) 1 mg in sterile water (preservative free) 1 mL injection  1 mg InterCATHeter PRN    sodium chloride (NS) flush 10-30 mL  10-30 mL InterCATHeter PRN    sodium chloride (NS) flush 10 mL  10 mL InterCATHeter Q24H    sodium chloride (NS) flush 10 mL  10 mL InterCATHeter PRN    sodium chloride (NS) flush 10-40 mL  10-40 mL InterCATHeter Q8H    sodium chloride (NS) flush 20 mL  20 mL InterCATHeter Q24H    bacitracin 500 unit/gram packet 1 Packet  1 Packet Topical PRN    lactobac ac& pc-s.therm-b.anim (AJIT Q/RISAQUAD)  1 Cap Oral DAILY    senna (SENOKOT) tablet 8.6 mg  1 Tab Oral QHS    sodium chloride (NS) flush 5-10 mL  5-10 mL IntraVENous Q8H    sodium chloride (NS) flush 5-10 mL  5-10 mL IntraVENous PRN    naloxone (NARCAN) injection 0.4 mg  0.4 mg IntraVENous PRN    docusate sodium (COLACE) capsule 100 mg  100 mg Oral BID    Vancomycin random- 6/20; please draw at the beginning of dialysis   Other DIALYSIS ONCE    vancomycin (VANCOCIN) 500 mg in 0.9% sodium chloride (MBP/ADV) 100 mL  500 mg IntraVENous DIALYSIS TUE, THU & SAT    albuterol-ipratropium (DUO-NEB) 2.5 MG-0.5 MG/3 ML  3 mL Nebulization Q4H PRN    piperacillin-tazobactam (ZOSYN) 3.375 g in 0.9% sodium chloride (MBP/ADV) 100 mL  3.375 g IntraVENous Q12H    Vancomycin Pharmacy Dosing   Other Rx Dosing/Monitoring    allopurinol (ZYLOPRIM) tablet 100 mg  100 mg Oral DAILY AFTER BREAKFAST    amitriptyline (ELAVIL) tablet 25 mg  25 mg Oral QHS PRN    amLODIPine (NORVASC) tablet 10 mg  10 mg Oral DAILY    cyanocobalamin (VITAMIN B12) tablet 2,500 mcg  2,500 mcg Oral DAILY    gemfibrozil (LOPID) tablet 300 mg  300 mg Oral DAILY    hydroxychloroquine (PLAQUENIL) tablet 200 mg  200 mg Oral BID    pantoprazole (PROTONIX) tablet 40 mg  40 mg Oral ACB    senna (SENOKOT) tablet 8.6 mg  1 Tab Oral DAILY PRN    ondansetron (ZOFRAN) injection 4 mg  4 mg IntraVENous Q4H PRN    arformoterol (BROVANA) neb solution 15 mcg  15 mcg Nebulization BID RT    And    budesonide (PULMICORT) 500 mcg/2 ml nebulizer suspension  500 mcg Nebulization BID RT    carvedilol (COREG) tablet 25 mg  25 mg Oral BID WITH MEALS    losartan (COZAAR) tablet 50 mg  50 mg Oral DAILY     ______________________________________________________________________  EXPECTED LENGTH OF STAY: 10d 7h  ACTUAL LENGTH OF STAY:          9                 Nicole Prajapati MD

## 2018-06-26 NOTE — PROGRESS NOTES
Palliative Medicine Consult  [place]: [phone] ([phone])    Patient Name: Michael Hummel  YOB: 1985    Date of Initial Consult: June 25, 2018  Reason for Consult: Pain Management  Requesting Provider: Dr. Job Macias  Primary Care Physician: Kennis Cogan, NP     SUMMARY:   Michael Hummel is a 29 y.o. with a past history of SLE, ESRD (dialysis), pulmonary embolism, on Eliquis since 2012, HLD, HTN, Candida peritonitis, chronic back pain,abdominal cavity abscess  who was admitted on 6/16/2018 from home due to shortness of breath and was diagnosed with sepsis, HCAP, elevated troponin level, leukopenia. Other issues include partially loculated pleural effusion s/p rt VATS and full pulmonary decortication of the right lower lobe,     Pt is chronically on opioids prescribed by Dr. Lees Ro [phone] for back pain oxycodone 5mg. Pain control suboptimal despite iv fentanyl      Current medical issues leading to Palliative Medicine involvement include: pain management. PALLIATIVE DIAGNOSES:   1. Generalized Pain  2. Nausea and vomiting  3. Hypoalbuminemia  4. Physical debility     PLAN:   1. Pt responding well to changes in pain regimen  2. Dilaudid 0.5mg iv q 4 prn for pain not responding to oral.  Pt is requesting every 4 hours  3. Oxycodone IR 10mg q 3 prn.  2 doses/24 hours  4. Try haldol 2mg q 4 prn for nausea  5. Decrease dosing interval of dilaudid based on clinical assessment. 6. Pain overall controlled  7. Please re-consult if we can be of further support  8. Initial consult note routed to primary continuity provider  9.  Communicated plan of care with: Palliative IDT       GOALS OF CARE / TREATMENT PREFERENCES:     GOALS OF CARE:  Patient/Health Care Proxy Stated Goals: Prolong life      TREATMENT PREFERENCES:   Code Status: Full Code    Advance Care Planning:  Advance Care Planning 6/25/2018   Patient's Healthcare Decision Maker is: Legal Next of Kin   Primary Decision Maker Name Haylee Darby   Primary Decision Maker Phone Number [phone]   Primary Decision Maker Relationship to Patient Parent   Confirm Advance Directive -   Patient Would Like to Complete Advance Directive -       Medical Interventions: Full interventions   Other Instructions: full restorative therapies      Other:    As far as possible, the palliative care team has discussed with patient / health care proxy about goals of care / treatment preferences for patient. HISTORY:     History obtained from: pt, attending, chart    CHIEF COMPLAINT: pain all over    HPI/SUBJECTIVE:    The patient is:   [x] Verbal and participatory  [] Non-participatory due to:   Seen during dialysis  Pt taking iv dilaudid 0.5mg q 4 prn with good results. 2 doses of oxycodone IR 10mg  in less than 24 hours.       Clinical Pain Assessment (nonverbal scale for severity on nonverbal patients):   Clinical Pain Assessment  Severity: 2  Location: all over  Character: can't describe it  Duration: days  Effect: limited activity  Factors: iv dilaudid helps  Frequency: all the time          Duration: for how long has pt been experiencing pain (e.g., 2 days, 1 month, years)  Frequency: how often pain is an issue (e.g., several times per day, once every few days, constant)     FUNCTIONAL ASSESSMENT:     Palliative Performance Scale (PPS):  PPS: 60       PSYCHOSOCIAL/SPIRITUAL SCREENING:     Palliative IDT has assessed this patient for cultural preferences / practices and a referral made as appropriate to needs (Cultural Services, Patient Advocacy, Ethics, etc.)    Advance Care Planning:  Advance Care Planning 6/25/2018   Patient's Healthcare Decision Maker is: Legal Next of Maria Antonia 70   Primary Decision Maker Name Haylee Darby   Primary Decision Maker Phone Number [phone]   Primary Decision Maker Relationship to Patient Parent   Confirm Advance Directive -   Patient Would Like to Complete Advance Directive -       Any spiritual / Scientology concerns:  [] Yes /  [x] No    Caregiver Burnout:  [] Yes /  [x] No /  [] No Caregiver Present      Anticipatory grief assessment:   [x] Normal  / [] Maladaptive       ESAS Anxiety: Anxiety: 0    ESAS Depression: Depression: 0        REVIEW OF SYSTEMS:     Positive and pertinent negative findings in ROS are noted above in HPI. The following systems were [x] reviewed / [] unable to be reviewed as noted in HPI  Other findings are noted below. Systems: constitutional, ears/nose/mouth/throat, respiratory, gastrointestinal, genitourinary, musculoskeletal, integumentary, neurologic, psychiatric, endocrine. Positive findings noted below. Modified ESAS Completed by: provider   Fatigue: 1 Drowsiness: 0   Depression: 0 Pain: 2   Anxiety: 0 Nausea: 1     Dyspnea: 0           Stool Occurrence(s): 1        PHYSICAL EXAM:     From RN flowsheet:  Wt Readings from Last 3 Encounters:   06/26/18 155 lb 13.8 oz (70.7 kg)   06/12/18 138 lb (62.6 kg)   06/05/18 137 lb (62.1 kg)     Blood pressure (!) 168/104, pulse 97, temperature 99.4 °F (37.4 °C), temperature source Oral, resp. rate 17, height 5' 5\" (1.651 m), weight 155 lb 13.8 oz (70.7 kg), SpO2 95 %.     Pain Scale 1: Numeric (0 - 10)  Pain Intensity 1: 8  Pain Onset 1: will medicate soon  Pain Location 1: Rib cage  Pain Orientation 1: Right  Pain Description 1: Aching  Pain Intervention(s) 1: Medication (see MAR)  Last bowel movement, if known:     Constitutional: alert, nad, conversant  Eyes: pupils equal, anicteric  ENMT: no nasal discharge, moist mucous membranes  Cardiovascular:   Respiratory: breathing not labored, symmetric  Gastrointestinal: soft non-tender, +bowel sounds  Musculoskeletal: no deformity, no tenderness to palpation  Skin: warm, dry  Neurologic: following commands, moving all extremities  Psychiatric: full affect, no hallucinations  Other:       HISTORY:     Principal Problem:    Sepsis ([facility] [address] (4/29/2018)    Active Problems:    Troponin level elevated (2018)      Past Medical History:   Diagnosis Date    Anemia     secondary to lupus    Asthma     no inhaler use in past 2 to 3 years    Carditis     Chronic kidney disease     ESRD    Chronic pain     DDD (degenerative disc disease), lumbar     ESRD (end stage renal disease) ([facility] [address]     GERD (gastroesophageal reflux disease)     Heart failure ([facility] [address]     Hemodialysis patient ([facility] [address] 2017    [address]  Tuesday,  Thursday,  and Saturday.  Hypercholesterolemia     Hypertension     Intractable nausea and vomiting 10/21/2015    Long term (current) use of anticoagulants     Lupus     Lupus (systemic lupus erythematosus) (HCC)     Malignant hypertension with chronic kidney disease stage V ([facility] [address]     Peritoneal dialysis status ([facility] [address] 10/2015    x 2 years Stopped 2017 due to infection and removed.  Poor historian 2018    With medications    Thromboembolus ([facility] [address] 2013    lungs    Transfusion history     Last Transfusion 2017  at [facility]      Past Surgical History:   Procedure Laterality Date    HX  SECTION  11/2006    x1    HX OTHER SURGICAL  9/16/15    INSERTION PD CATH; Removed 2017    HX VASCULAR ACCESS Right 2017    Double-Lumen henry catheter upper chest      Family History   Problem Relation Age of Onset    Diabetes Father     Hypertension Father     Cancer Other      aunt with breast cancer    Diabetes Mother       History reviewed, no pertinent family history.   Social History   Substance Use Topics    Smoking status: Never Smoker    Smokeless tobacco: Never Used    Alcohol use No     Allergies   Allergen Reactions    Compazine [Prochlorperazine Edisylate] Nausea and Vomiting and Palpitations      Current Facility-Administered Medications   Medication Dose Route Frequency    0.9% sodium chloride infusion 250 mL  250 mL IntraVENous PRN    hydrALAZINE (APRESOLINE) 20 mg/mL injection 20 mg  20 mg IntraVENous Q6H PRN    cloNIDine HCl (CATAPRES) tablet 0.1 mg  0.1 mg Oral TID    predniSONE (DELTASONE) tablet 10 mg  10 mg Oral DAILY    azaTHIOprine (IMURAN) tablet 50 mg  50 mg Oral DAILY AFTER BREAKFAST    epoetin pia (EPOGEN;PROCRIT) injection 10,000 Units  10,000 Units SubCUTAneous DIALYSIS TUE, THU & SAT    HYDROmorphone (DILAUDID) syringe 0.5 mg  0.5 mg IntraVENous Q4H PRN    oxyCODONE IR (ROXICODONE) tablet 10 mg  10 mg Oral Q4H PRN    haloperidol lactate (HALDOL) injection 2 mg  2 mg IntraVENous Q4H PRN    apixaban (ELIQUIS) tablet 2.5 mg  2.5 mg Oral BID    alteplase (CATHFLO) 1 mg in sterile water (preservative free) 1 mL injection  1 mg InterCATHeter PRN    sodium chloride (NS) flush 10-30 mL  10-30 mL InterCATHeter PRN    sodium chloride (NS) flush 10 mL  10 mL InterCATHeter Q24H    sodium chloride (NS) flush 10 mL  10 mL InterCATHeter PRN    sodium chloride (NS) flush 10-40 mL  10-40 mL InterCATHeter Q8H    sodium chloride (NS) flush 20 mL  20 mL InterCATHeter Q24H    bacitracin 500 unit/gram packet 1 Packet  1 Packet Topical PRN    lactobac ac& pc-s.therm-b.anim (AJIT Q/RISAQUAD)  1 Cap Oral DAILY    senna (SENOKOT) tablet 8.6 mg  1 Tab Oral QHS    sodium chloride (NS) flush 5-10 mL  5-10 mL IntraVENous Q8H    sodium chloride (NS) flush 5-10 mL  5-10 mL IntraVENous PRN    naloxone (NARCAN) injection 0.4 mg  0.4 mg IntraVENous PRN    docusate sodium (COLACE) capsule 100 mg  100 mg Oral BID    Vancomycin random- 6/20; please draw at the beginning of dialysis   Other DIALYSIS ONCE    vancomycin (VANCOCIN) 500 mg in 0.9% sodium chloride (MBP/ADV) 100 mL  500 mg IntraVENous DIALYSIS TUE, THU & SAT    albuterol-ipratropium (DUO-NEB) 2.5 MG-0.5 MG/3 ML  3 mL Nebulization Q4H PRN    piperacillin-tazobactam (ZOSYN) 3.375 g in 0.9% sodium chloride (MBP/ADV) 100 mL  3.375 g IntraVENous Q12H    Vancomycin Pharmacy Dosing   Other Rx Dosing/Monitoring    allopurinol (ZYLOPRIM) tablet 100 mg  100 mg Oral DAILY AFTER BREAKFAST    amitriptyline (ELAVIL) tablet 25 mg  25 mg Oral QHS PRN    amLODIPine (NORVASC) tablet 10 mg  10 mg Oral DAILY    cyanocobalamin (VITAMIN B12) tablet 2,500 mcg  2,500 mcg Oral DAILY    gemfibrozil (LOPID) tablet 300 mg  300 mg Oral DAILY    hydroxychloroquine (PLAQUENIL) tablet 200 mg  200 mg Oral BID    pantoprazole (PROTONIX) tablet 40 mg  40 mg Oral ACB    senna (SENOKOT) tablet 8.6 mg  1 Tab Oral DAILY PRN    ondansetron (ZOFRAN) injection 4 mg  4 mg IntraVENous Q4H PRN    arformoterol (BROVANA) neb solution 15 mcg  15 mcg Nebulization BID RT    And    budesonide (PULMICORT) 500 mcg/2 ml nebulizer suspension  500 mcg Nebulization BID RT    carvedilol (COREG) tablet 25 mg  25 mg Oral BID WITH MEALS    losartan (COZAAR) tablet 50 mg  50 mg Oral DAILY          LAB AND IMAGING FINDINGS:     Lab Results   Component Value Date/Time    WBC 7.1 06/26/2018 03:44 AM    HGB 6.9 (L) 06/26/2018 03:44 AM    PLATELET 190 44/99/0864 03:44 AM     Lab Results   Component Value Date/Time    Sodium 139 06/26/2018 03:44 AM    Potassium 5.2 (H) 06/26/2018 03:44 AM    Chloride 102 06/26/2018 03:44 AM    CO2 27 06/26/2018 03:44 AM    BUN 45 (H) 06/26/2018 03:44 AM    Creatinine 6.46 (H) 06/26/2018 03:44 AM    Calcium 9.2 06/26/2018 03:44 AM    Magnesium 2.2 06/26/2018 03:44 AM    Phosphorus 5.1 (H) 06/26/2018 03:44 AM      Lab Results   Component Value Date/Time    AST (SGOT) 16 06/26/2018 03:44 AM    Alk.  phosphatase 78 06/26/2018 03:44 AM    Protein, total 6.4 06/26/2018 03:44 AM    Albumin 1.8 (L) 06/26/2018 03:44 AM    Globulin 4.6 (H) 06/26/2018 03:44 AM     Lab Results   Component Value Date/Time    INR 1.1 05/13/2018 09:51 AM    Prothrombin time 11.6 (H) 05/13/2018 09:51 AM    aPTT 30.7 05/13/2018 09:51 AM      Lab Results   Component Value Date/Time    Iron 34 (L) 06/17/2018 03:00 PM    Iron 34 (L) 06/17/2018 03:00 PM    TIBC 137 (L) 06/17/2018 03:00 PM    Iron % saturation 25 06/17/2018 03:00 PM Ferritin 4182 (H) 06/17/2018 03:00 PM      No results found for: PH, PCO2, PO2  No components found for: [place]   Lab Results   Component Value Date/Time    CK 25 (L) 06/18/2018 02:10 AM    CK - MB <1.0 06/18/2018 02:10 AM                Total time: 25 minutes  Counseling / coordination time, spent as noted above: 20 minutes  > 50% counseling / coordination?: y    Prolonged service was provided for  []30 min   []75 min in face to face time in the presence of the patient, spent as noted above. Time Start:   Time End:   Note: this can only be billed with 64879 (initial) or 46844 (follow up). If multiple start / stop times, list each separately.

## 2018-06-26 NOTE — PROGRESS NOTES
Thoracic Surgery Simple Progress Note    Admit Date: 2018  POD: 5 Days Post-Op      Procedure:  Procedure(s):  RIGHT VIDEO ASSITED THORASCOPY, DECORTICATION      Subjective:     Patient has complaints: No significant medical complaints    Review of Systems:    CARDIAC: positive for HTN  RESP: positive for pleural effusion  NEURO:  negative  INCISION: Clean, dry, and intact  EXT: Denies new swelling or pain in the legs or calves. Objective:     Blood pressure 123/78, pulse 91, temperature 98.2 °F (36.8 °C), resp. rate 17, height 5' 5\" (1.651 m), weight 155 lb 13.8 oz (70.7 kg), SpO2 95 %. Temp (24hrs), Av.7 °F (37.1 °C), Min:98.2 °F (36.8 °C), Max:99 °F (37.2 °C)        Hemodynamics    PAP    CO    CI        1901 -  0700  In: 660 [P.O.:420; I.V.:200]  Out: 185 [Urine:60; Drains:25]    EXAM:  GENERAL: VSS, afrible, alert and cooperative  HEART:  regular rate and rhythm  LUNG: clear to auscultation bilaterally, chest tube drainage is minimal, no air leak, CXR stable off suction  NEURO:  normal without focal findings  mental status, speech normal, alert and oriented x iii  INCISION: Clean, dry, and intact  EXTREMITIES:No evidence of DVT seen on physical exam.  GI/: Abd soft, nonterder with + bowel sounds.  HD today    Labs:  Recent Results (from the past 24 hour(s))   CBC W/O DIFF    Collection Time: 18  3:44 AM   Result Value Ref Range    WBC 7.1 3.6 - 11.0 K/uL    RBC 2.58 (L) 3.80 - 5.20 M/uL    HGB 6.9 (L) 11.5 - 16.0 g/dL    HCT 23.0 (L) 35.0 - 47.0 %    MCV 89.1 80.0 - 99.0 FL    MCH 26.7 26.0 - 34.0 PG    MCHC 30.0 30.0 - 36.5 g/dL    RDW 19.4 (H) 11.5 - 14.5 %    PLATELET 672 373 - 997 K/uL    MPV 9.9 8.9 - 12.9 FL    NRBC 0.0 0  WBC    ABSOLUTE NRBC 0.00 0.00 - 0.01 K/uL   MAGNESIUM    Collection Time: 18  3:44 AM   Result Value Ref Range    Magnesium 2.2 1.6 - 2.4 mg/dL   PHOSPHORUS    Collection Time: 18  3:44 AM   Result Value Ref Range    Phosphorus 5.1 (H) 2.6 - 4.7 MG/DL   METABOLIC PANEL, COMPREHENSIVE    Collection Time: 06/26/18  3:44 AM   Result Value Ref Range    Sodium 139 136 - 145 mmol/L    Potassium 5.2 (H) 3.5 - 5.1 mmol/L    Chloride 102 97 - 108 mmol/L    CO2 27 21 - 32 mmol/L    Anion gap 10 5 - 15 mmol/L    Glucose 87 65 - 100 mg/dL    BUN 45 (H) 6 - 20 MG/DL    Creatinine 6.46 (H) 0.55 - 1.02 MG/DL    BUN/Creatinine ratio 7 (L) 12 - 20      GFR est AA 9 (L) >60 ml/min/1.73m2    GFR est non-AA 7 (L) >60 ml/min/1.73m2    Calcium 9.2 8.5 - 10.1 MG/DL    Bilirubin, total 0.4 0.2 - 1.0 MG/DL    ALT (SGPT) 9 (L) 12 - 78 U/L    AST (SGOT) 16 15 - 37 U/L    Alk. phosphatase 78 45 - 117 U/L    Protein, total 6.4 6.4 - 8.2 g/dL    Albumin 1.8 (L) 3.5 - 5.0 g/dL    Globulin 4.6 (H) 2.0 - 4.0 g/dL    A-G Ratio 0.4 (L) 1.1 - 2.2         Assessment:   No evidence of DVT.   Principal Problem:    Sepsis ([address] (4/29/2018)    Active Problems:    Troponin level elevated (6/17/2018)      PATH: Benign  Plan/Recommendations:   Plan to remove chest tube later today    See orders    Signed By: Merle PINK

## 2018-06-26 NOTE — PROGRESS NOTES
NAME: Pro Pope        :  1985        MRN:  [id number]        Assessment :    Plan:  - End-stage renal disease - TTS - [place]     SLE    Anemia    PNA/Right paraneumonic loculated effusion-> s/p VATS      -Will unfortunately need PRBC x1 unit off HD later today    Ct current antihypertensive regimen    Chest tube to be removed today    Continue EPO. Ct Nepro               Subjective:     Chief Complaint: Seen on HD at 12:15pm.  Reports pain is better. Review of Systems:    Symptom Y/N Comments  Symptom Y/N Comments   Fever/Chills    Chest Pain     Poor Appetite    Edema     Cough    Abdominal Pain     Sputum    Joint Pain     SOB/IVY    Pruritis/Rash     Nausea/vomit    Tolerating PT/OT     Diarrhea    Tolerating Diet     Constipation    Other       Could not obtain due to:      Objective:     VITALS:   Last 24hrs VS reviewed since prior progress note. Most recent are:  Visit Vitals    BP (!) 153/100    Pulse 89    Temp 99.4 °F (37.4 °C) (Oral)    Resp 17    Ht 5' 5\" (1.651 m)    Wt 70.7 kg (155 lb 13.8 oz)    SpO2 95%    BMI 25.94 kg/m2       Intake/Output Summary (Last 24 hours) at 18 1231  Last data filed at 18 0335   Gross per 24 hour   Intake              140 ml   Output               95 ml   Net               45 ml      Telemetry Reviewed:     PHYSICAL EXAM:  General: NAD  CTA  Right chest tube  No LE edema.  RUE edema      Lab Data Reviewed: (see below)    Medications Reviewed: (see below)    PMH/SH reviewed - no change compared to H&P  ________________________________________________________________________  Care Plan discussed with:  Patient Y    Family      HD RN Y    Care Manager                    Consultant:          Comments   >50% of visit spent in counseling and coordination of care       ________________________________________________________________________  Deep Nina Conteh MD     Procedures: see electronic medical records for all procedures/Xrays and details which  were not copied into this note but were reviewed prior to creation of Plan. LABS:  Recent Labs      06/26/18 0344 06/25/18 0347   WBC  7.1  9.1   HGB  6.9*  7.2*   HCT  23.0*  24.1*   PLT  214  199     Recent Labs      06/26/18   0344  06/25/18 0347 06/24/18 0346   NA  139  139  140   K  5.2*  4.9  4.6   CL  102  103  105   CO2  27  26  29   BUN  45*  36*  27*   CREA  6.46*  5.18*  3.91*   GLU  87  89  85   CA  9.2  8.7  8.2*   MG  2.2  2.0  2.0   PHOS  5.1*  3.8  3.2     Recent Labs      06/26/18 0344 06/25/18 0347 06/24/18 0346   SGOT  16  23  13*   AP  78  60  55   TP  6.4  6.4  5.9*   ALB  1.8*  1.9*  1.7*   GLOB  4.6*  4.5*  4.2*     No results for input(s): INR, PTP, APTT in the last 72 hours. No lab exists for component: INREXT, INREXT   No results for input(s): FE, TIBC, PSAT, FERR in the last 72 hours. Lab Results   Component Value Date/Time    Folate 13.7 06/17/2018 03:00 PM      No results for input(s): PH, PCO2, PO2 in the last 72 hours. No results for input(s): CPK, CKMB in the last 72 hours.     No lab exists for component: TROPONINI  No components found for: [place]  Lab Results   Component Value Date/Time    Color YELLOW/STRAW 08/12/2016 09:06 PM    Appearance CLEAR 08/12/2016 09:06 PM    Specific gravity 1.017 08/12/2016 09:06 PM    Specific gravity 1.010 07/11/2016 12:44 PM    pH (UA) 7.0 08/12/2016 09:06 PM    Protein 300 (A) 08/12/2016 09:06 PM    Glucose NEGATIVE  08/12/2016 09:06 PM    Ketone TRACE (A) 08/12/2016 09:06 PM    Bilirubin NEGATIVE  07/11/2016 12:44 PM    Urobilinogen 1.0 08/12/2016 09:06 PM    Nitrites NEGATIVE  08/12/2016 09:06 PM    Leukocyte Esterase NEGATIVE  08/12/2016 09:06 PM    Epithelial cells MODERATE (A) 08/12/2016 09:06 PM    Bacteria 1+ (A) 08/12/2016 09:06 PM    WBC 0-4 08/12/2016 09:06 PM    RBC 0-5 08/12/2016 09:06 PM MEDICATIONS:  Current Facility-Administered Medications   Medication Dose Route Frequency    Vancomycin random level 6/26 please draw at beginning of HD session   Other ONCE    0.9% sodium chloride infusion 250 mL  250 mL IntraVENous PRN    hydrALAZINE (APRESOLINE) 20 mg/mL injection 20 mg  20 mg IntraVENous Q6H PRN    cloNIDine HCl (CATAPRES) tablet 0.1 mg  0.1 mg Oral TID    predniSONE (DELTASONE) tablet 10 mg  10 mg Oral DAILY    azaTHIOprine (IMURAN) tablet 50 mg  50 mg Oral DAILY AFTER BREAKFAST    epoetin pia (EPOGEN;PROCRIT) injection 10,000 Units  10,000 Units SubCUTAneous DIALYSIS TUE, THU & SAT    HYDROmorphone (DILAUDID) syringe 0.5 mg  0.5 mg IntraVENous Q4H PRN    oxyCODONE IR (ROXICODONE) tablet 10 mg  10 mg Oral Q4H PRN    haloperidol lactate (HALDOL) injection 2 mg  2 mg IntraVENous Q4H PRN    apixaban (ELIQUIS) tablet 2.5 mg  2.5 mg Oral BID    alteplase (CATHFLO) 1 mg in sterile water (preservative free) 1 mL injection  1 mg InterCATHeter PRN    sodium chloride (NS) flush 10-30 mL  10-30 mL InterCATHeter PRN    sodium chloride (NS) flush 10 mL  10 mL InterCATHeter Q24H    sodium chloride (NS) flush 10 mL  10 mL InterCATHeter PRN    sodium chloride (NS) flush 10-40 mL  10-40 mL InterCATHeter Q8H    sodium chloride (NS) flush 20 mL  20 mL InterCATHeter Q24H    bacitracin 500 unit/gram packet 1 Packet  1 Packet Topical PRN    lactobac ac& pc-s.therm-b.anim (AJIT Q/RISAQUAD)  1 Cap Oral DAILY    senna (SENOKOT) tablet 8.6 mg  1 Tab Oral QHS    sodium chloride (NS) flush 5-10 mL  5-10 mL IntraVENous Q8H    sodium chloride (NS) flush 5-10 mL  5-10 mL IntraVENous PRN    naloxone (NARCAN) injection 0.4 mg  0.4 mg IntraVENous PRN    docusate sodium (COLACE) capsule 100 mg  100 mg Oral BID    Vancomycin random- 6/20; please draw at the beginning of dialysis   Other DIALYSIS ONCE    vancomycin (VANCOCIN) 500 mg in 0.9% sodium chloride (MBP/ADV) 100 mL  500 mg IntraVENous DIALYSIS TUE, THU & SAT    albuterol-ipratropium (DUO-NEB) 2.5 MG-0.5 MG/3 ML  3 mL Nebulization Q4H PRN    piperacillin-tazobactam (ZOSYN) 3.375 g in 0.9% sodium chloride (MBP/ADV) 100 mL  3.375 g IntraVENous Q12H    Vancomycin Pharmacy Dosing   Other Rx Dosing/Monitoring    allopurinol (ZYLOPRIM) tablet 100 mg  100 mg Oral DAILY AFTER BREAKFAST    amitriptyline (ELAVIL) tablet 25 mg  25 mg Oral QHS PRN    amLODIPine (NORVASC) tablet 10 mg  10 mg Oral DAILY    cyanocobalamin (VITAMIN B12) tablet 2,500 mcg  2,500 mcg Oral DAILY    gemfibrozil (LOPID) tablet 300 mg  300 mg Oral DAILY    hydroxychloroquine (PLAQUENIL) tablet 200 mg  200 mg Oral BID    pantoprazole (PROTONIX) tablet 40 mg  40 mg Oral ACB    senna (SENOKOT) tablet 8.6 mg  1 Tab Oral DAILY PRN    ondansetron (ZOFRAN) injection 4 mg  4 mg IntraVENous Q4H PRN    arformoterol (BROVANA) neb solution 15 mcg  15 mcg Nebulization BID RT    And    budesonide (PULMICORT) 500 mcg/2 ml nebulizer suspension  500 mcg Nebulization BID RT    carvedilol (COREG) tablet 25 mg  25 mg Oral BID WITH MEALS    losartan (COZAAR) tablet 50 mg  50 mg Oral DAILY

## 2018-06-26 NOTE — PROGRESS NOTES
Pt well known to me -multiple hospitalizations  Presented to the ED on 6/17 with SOB  On admission she had rt pleural effusion which was thought to be loculated with underlying pneumonia  She was started on vanco and zosyn   She underwent rt VATS  With full pulmonary decortication of rt lower lobe on 6/21/18  Rt pleural fluid culture is neg  Pathology is chronic pleuritis    Subjective  She has no fever    rt chest tube removed today  Doing okay  Some soreness     Objective:   VITALS:    Visit Vitals    /81 (BP 1 Location: Left arm, BP Patient Position: Supine)    Pulse 90    Temp 98.4 °F (36.9 °C)    Resp 18    Ht 5' 5\" (1.651 m)    Wt 155 lb 13.8 oz (70.7 kg)    SpO2 98%    BMI 25.94 kg/m2     PHYSICAL EXAM:   General:                    Alert, cooperative, no distress, .     Lungs:                       B/l air entry  Decreased rt base  Chest drain rt out  Heart:                                  s1s2  Abdomen:                  soft  Drain rt side  Extremities:               Rt arm graft       Skin:                                    Dry skin- striae over arms/abdomen  Lymph:                      Cervical, supraclavicular normal.  Neurologic:                Grossly non-focal  Left IJ line     Pertinent Labs  Wbc7.1   Hb 6.9    Albumin 1.8  Pleural fluid culture NG so far     IMAGING RESULTS:    Impression/Recommendation     29 yr female with SLE/ lupus nephritis/HTN -h/o peritoneal dialysis until Dec 2017 /Candida peritonitis in Dec 2017/ treated with 4 weeks of fluconazole  E.coli peritonitis in [facility] /april 2018     Admitted with sob     Rt pleural effusion loculated- Concern for underlying pneumonia and parapneumonic effusion- S/p VATS - culture neg   On vanco and zosyn day 11  Can DC antibiotics      H/o Peritonitis and peritoneal abscess-treated  Still has a drain- need to remove this        Chronic leucopenia- has lupus     SLE-was on /plaquenil and prednisone     ESRD due to lupus nephritis on dialysis     HTN-on amlodipine/cozaar     H/o PE- on elaquis     Discussed with patient and hospitalist  There is little else that I can offer at this point in time. I will sign off this patient's case. Please call back PRN.

## 2018-06-26 NOTE — PROGRESS NOTES
Day #10 of Vancomycin  Indication: HCAP/GPC bacteremia (S/P VATS procedure )  Current regimen:  500 mg IV T,T,S with HD   Abx regimen:  Vanc + Zosyn  ID Following ?: YES  Inpatient dialysis schedule: TTS    Recent Labs      18   0344  18   0347  18   0346   WBC  7.1  9.1  5.8   CREA  6.46*  5.18*  3.91*   BUN  45*  36*  27*     Est CrCl: ESRD on HD  Temp (24hrs), Av.7 °F (37.1 °C), Min:98.2 °F (36.8 °C), Max:99 °F (37.2 °C)    Cultures:    blood: Alpha Strep (1/2 bottles), final   blood: NG, final   MRSA screen: negative   pleural fluid: NGTD - final    Goal trough = 20-25 mcg/mL for therapeutic goal 15-20 mcg/mL (assuming ~35% removal by dialysis)    Date                Dialysis (Yes/No)       Pre-HD Level              Dose    N   --   1.5 grams                  N                                --                                    --    Y   --   500 mg    Y (2 hour session) 19.7   500 mg     Y   --   500 mg    N   --   --                   Y                                 --                                 500 mg                    N                                 --                                 --                   N                                 --                                 --                   Y                                 ordered                       500 mg planned    Plan: Continue current regimen. Next HD on planned for today. Ordering pre-HD trough level and entering staff message to ensure dose given/HD completed.     Vancomycin Process in Hemodialysis

## 2018-06-27 ENCOUNTER — APPOINTMENT (OUTPATIENT)
Dept: GENERAL RADIOLOGY | Age: 33
DRG: 853 | End: 2018-06-27
Attending: THORACIC SURGERY (CARDIOTHORACIC VASCULAR SURGERY)
Payer: MEDICARE

## 2018-06-27 LAB
ALBUMIN SERPL-MCNC: 1.8 G/DL (ref 3.5–5)
ALBUMIN/GLOB SERPL: 0.4 {RATIO} (ref 1.1–2.2)
ALP SERPL-CCNC: 66 U/L (ref 45–117)
ALT SERPL-CCNC: 8 U/L (ref 12–78)
ANION GAP SERPL CALC-SCNC: 10 MMOL/L (ref 5–15)
AST SERPL-CCNC: 12 U/L (ref 15–37)
BILIRUB SERPL-MCNC: 0.5 MG/DL (ref 0.2–1)
BUN SERPL-MCNC: 35 MG/DL (ref 6–20)
BUN/CREAT SERPL: 7 (ref 12–20)
CALCIUM SERPL-MCNC: 8.4 MG/DL (ref 8.5–10.1)
CHLORIDE SERPL-SCNC: 103 MMOL/L (ref 97–108)
CO2 SERPL-SCNC: 28 MMOL/L (ref 21–32)
CREAT SERPL-MCNC: 4.74 MG/DL (ref 0.55–1.02)
ERYTHROCYTE [DISTWIDTH] IN BLOOD BY AUTOMATED COUNT: 18.6 % (ref 11.5–14.5)
GLOBULIN SER CALC-MCNC: 4.2 G/DL (ref 2–4)
GLUCOSE SERPL-MCNC: 92 MG/DL (ref 65–100)
HCT VFR BLD AUTO: 23.3 % (ref 35–47)
HGB BLD-MCNC: 7.2 G/DL (ref 11.5–16)
MAGNESIUM SERPL-MCNC: 2 MG/DL (ref 1.6–2.4)
MCH RBC QN AUTO: 27.5 PG (ref 26–34)
MCHC RBC AUTO-ENTMCNC: 30.9 G/DL (ref 30–36.5)
MCV RBC AUTO: 88.9 FL (ref 80–99)
NRBC # BLD: 0 K/UL (ref 0–0.01)
NRBC BLD-RTO: 0 PER 100 WBC
PHOSPHATE SERPL-MCNC: 4.2 MG/DL (ref 2.6–4.7)
PLATELET # BLD AUTO: 201 K/UL (ref 150–400)
PMV BLD AUTO: 10.2 FL (ref 8.9–12.9)
POTASSIUM SERPL-SCNC: 4.2 MMOL/L (ref 3.5–5.1)
PROT SERPL-MCNC: 6 G/DL (ref 6.4–8.2)
RBC # BLD AUTO: 2.62 M/UL (ref 3.8–5.2)
SODIUM SERPL-SCNC: 141 MMOL/L (ref 136–145)
WBC # BLD AUTO: 5.1 K/UL (ref 3.6–11)

## 2018-06-27 PROCEDURE — 94664 DEMO&/EVAL PT USE INHALER: CPT

## 2018-06-27 PROCEDURE — 74011250637 HC RX REV CODE- 250/637: Performed by: INTERNAL MEDICINE

## 2018-06-27 PROCEDURE — 74011250637 HC RX REV CODE- 250/637: Performed by: NURSE PRACTITIONER

## 2018-06-27 PROCEDURE — 65270000032 HC RM SEMIPRIVATE

## 2018-06-27 PROCEDURE — 94640 AIRWAY INHALATION TREATMENT: CPT

## 2018-06-27 PROCEDURE — 74011250637 HC RX REV CODE- 250/637: Performed by: HOSPITALIST

## 2018-06-27 PROCEDURE — 36415 COLL VENOUS BLD VENIPUNCTURE: CPT | Performed by: INTERNAL MEDICINE

## 2018-06-27 PROCEDURE — 74011250637 HC RX REV CODE- 250/637: Performed by: THORACIC SURGERY (CARDIOTHORACIC VASCULAR SURGERY)

## 2018-06-27 PROCEDURE — 84100 ASSAY OF PHOSPHORUS: CPT | Performed by: INTERNAL MEDICINE

## 2018-06-27 PROCEDURE — 74011636637 HC RX REV CODE- 636/637: Performed by: HOSPITALIST

## 2018-06-27 PROCEDURE — 74011000250 HC RX REV CODE- 250: Performed by: INTERNAL MEDICINE

## 2018-06-27 PROCEDURE — 77010033678 HC OXYGEN DAILY

## 2018-06-27 PROCEDURE — 74011250637 HC RX REV CODE- 250/637: Performed by: SPECIALIST

## 2018-06-27 PROCEDURE — 74011250636 HC RX REV CODE- 250/636: Performed by: NURSE PRACTITIONER

## 2018-06-27 PROCEDURE — 77030029684 HC NEB SM VOL KT MONA -A

## 2018-06-27 PROCEDURE — 83735 ASSAY OF MAGNESIUM: CPT | Performed by: INTERNAL MEDICINE

## 2018-06-27 PROCEDURE — 80053 COMPREHEN METABOLIC PANEL: CPT | Performed by: INTERNAL MEDICINE

## 2018-06-27 PROCEDURE — 74011250636 HC RX REV CODE- 250/636: Performed by: HOSPITALIST

## 2018-06-27 PROCEDURE — 71045 X-RAY EXAM CHEST 1 VIEW: CPT

## 2018-06-27 PROCEDURE — 85027 COMPLETE CBC AUTOMATED: CPT | Performed by: INTERNAL MEDICINE

## 2018-06-27 RX ADMIN — BUDESONIDE 500 MCG: 0.5 INHALANT RESPIRATORY (INHALATION) at 21:40

## 2018-06-27 RX ADMIN — DOCUSATE SODIUM 100 MG: 100 CAPSULE, LIQUID FILLED ORAL at 08:37

## 2018-06-27 RX ADMIN — OXYCODONE HYDROCHLORIDE 10 MG: 5 TABLET ORAL at 16:08

## 2018-06-27 RX ADMIN — CARVEDILOL 25 MG: 12.5 TABLET, FILM COATED ORAL at 08:37

## 2018-06-27 RX ADMIN — CARVEDILOL 25 MG: 12.5 TABLET, FILM COATED ORAL at 19:08

## 2018-06-27 RX ADMIN — HYDROMORPHONE HYDROCHLORIDE 0.5 MG: 1 INJECTION, SOLUTION INTRAMUSCULAR; INTRAVENOUS; SUBCUTANEOUS at 13:49

## 2018-06-27 RX ADMIN — ALLOPURINOL 100 MG: 100 TABLET ORAL at 08:37

## 2018-06-27 RX ADMIN — Medication 2500 MCG: at 08:37

## 2018-06-27 RX ADMIN — CLONIDINE HYDROCHLORIDE 0.1 MG: 0.1 TABLET ORAL at 21:36

## 2018-06-27 RX ADMIN — LOSARTAN POTASSIUM 50 MG: 50 TABLET ORAL at 08:38

## 2018-06-27 RX ADMIN — HYDROMORPHONE HYDROCHLORIDE 0.5 MG: 1 INJECTION, SOLUTION INTRAMUSCULAR; INTRAVENOUS; SUBCUTANEOUS at 23:56

## 2018-06-27 RX ADMIN — AMITRIPTYLINE HYDROCHLORIDE 25 MG: 50 TABLET, FILM COATED ORAL at 21:35

## 2018-06-27 RX ADMIN — GEMFIBROZIL 300 MG: 600 TABLET ORAL at 08:37

## 2018-06-27 RX ADMIN — HYDROXYCHLOROQUINE SULFATE 200 MG: 200 TABLET, FILM COATED ORAL at 19:08

## 2018-06-27 RX ADMIN — CLONIDINE HYDROCHLORIDE 0.1 MG: 0.1 TABLET ORAL at 08:37

## 2018-06-27 RX ADMIN — PREDNISONE 10 MG: 10 TABLET ORAL at 08:37

## 2018-06-27 RX ADMIN — PANTOPRAZOLE SODIUM 40 MG: 40 TABLET, DELAYED RELEASE ORAL at 06:39

## 2018-06-27 RX ADMIN — Medication 10 ML: at 13:49

## 2018-06-27 RX ADMIN — ARFORMOTEROL TARTRATE 15 MCG: 15 SOLUTION RESPIRATORY (INHALATION) at 21:40

## 2018-06-27 RX ADMIN — ARFORMOTEROL TARTRATE 15 MCG: 15 SOLUTION RESPIRATORY (INHALATION) at 09:22

## 2018-06-27 RX ADMIN — AZATHIOPRINE 50 MG: 50 TABLET ORAL at 08:36

## 2018-06-27 RX ADMIN — OXYCODONE HYDROCHLORIDE 10 MG: 5 TABLET ORAL at 11:45

## 2018-06-27 RX ADMIN — Medication 10 ML: at 03:53

## 2018-06-27 RX ADMIN — Medication 20 ML: at 06:39

## 2018-06-27 RX ADMIN — HYDROMORPHONE HYDROCHLORIDE 0.5 MG: 1 INJECTION, SOLUTION INTRAMUSCULAR; INTRAVENOUS; SUBCUTANEOUS at 08:36

## 2018-06-27 RX ADMIN — APIXABAN 2.5 MG: 2.5 TABLET, FILM COATED ORAL at 19:08

## 2018-06-27 RX ADMIN — HYDROMORPHONE HYDROCHLORIDE 0.5 MG: 1 INJECTION, SOLUTION INTRAMUSCULAR; INTRAVENOUS; SUBCUTANEOUS at 03:52

## 2018-06-27 RX ADMIN — HYDROMORPHONE HYDROCHLORIDE 0.5 MG: 1 INJECTION, SOLUTION INTRAMUSCULAR; INTRAVENOUS; SUBCUTANEOUS at 19:08

## 2018-06-27 RX ADMIN — Medication 1 CAPSULE: at 08:37

## 2018-06-27 RX ADMIN — DOCUSATE SODIUM 100 MG: 100 CAPSULE, LIQUID FILLED ORAL at 19:08

## 2018-06-27 RX ADMIN — HYDROXYCHLOROQUINE SULFATE 200 MG: 200 TABLET, FILM COATED ORAL at 08:37

## 2018-06-27 RX ADMIN — Medication 10 ML: at 14:00

## 2018-06-27 RX ADMIN — BUDESONIDE 500 MCG: 0.5 INHALANT RESPIRATORY (INHALATION) at 09:22

## 2018-06-27 RX ADMIN — SENNOSIDES 8.6 MG: 8.6 TABLET, FILM COATED ORAL at 21:35

## 2018-06-27 RX ADMIN — OXYCODONE HYDROCHLORIDE 10 MG: 5 TABLET ORAL at 21:35

## 2018-06-27 RX ADMIN — APIXABAN 2.5 MG: 2.5 TABLET, FILM COATED ORAL at 08:37

## 2018-06-27 RX ADMIN — Medication 40 ML: at 22:00

## 2018-06-27 RX ADMIN — AMLODIPINE BESYLATE 10 MG: 5 TABLET ORAL at 08:38

## 2018-06-27 NOTE — PROGRESS NOTES
Ms. Mark Anthony Duran is POD#6 after a R VATS decortication of a chronic effusion. No acute events overnight. Last CT removed yesterday. Tolerated HD    Afebrile  HD stable    On exam she Is resting comfortably in bed  I did not awaken her  Dressing c/d/i    CXR- stable in appearance    Ms. MarkA nthony Duran is progressing well. Respiratory status much improved. She is stable for discharge from a thoracic standpoint. She should follow up in 10-14 days. Will be available for future needs.

## 2018-06-27 NOTE — PROGRESS NOTES
Hospitalist Progress Note  Celine Walden MD  Answering service: [phone] OR [phone] from in house phone        Date of Service:  2018  NAME:  Rodrick Quintana  :  1985  MRN:  [id number]      Admission Summary: This is a 56-year-old woman with a past medical history significant for end-stage renal disease on hemodialysis, lupus, asthma, thromboembolism, dyslipidemia, was in her usual state of health until the day of presentation at the emergency room when the patient developed shortness of breath. The shortness of breath is progressive, it is with little or no activity. Patient had routine dialysis done. After the dialysis, that is when the patient developed the shortness of breath. Patient used to be on peritoneal dialysis, but was converted to hemodialysis because of recurrent infection. The patient was last admitted to this hospital from 2018 to 2018. She was admitted and treated for sepsis, attributed to multiple abdominal abscesses. Patient underwent drainage of the abdominal abscess. Patient also underwent drainage of a right pleural effusion. She was discharged home to complete a course of Augmentin. She stated that she has been having shortness of breath on and off since she was discharged from hospital.  A day before coming to the emergency room here, she was seen at another emergency room. Patient was evaluated, but was not admitted. When the patient arrived at the emergency room, she was found to have elevated lactic acid level, low grade temperature, tachycardia. Code sepsis was called. The chest x-ray shows evidence of pneumonia Similar presentation last admission as well    Interval history / Subjective:   Seen and examined. She has no complaints. Looked at peritoneal drain,drain ~100cc? last 24 hour.      Assessment & Plan:     Health care associated Pneumonia with partially loculated pleural effusion (?recurrent)  -CXR 6/17 Unchanged mild right pleural effusion with patchy opacification right base  -Vanc/Zosyn  -Follow cultures so far pending  -CT chest/abd 6/17 The pleural fluid on the right extends more superiorly in the right hemithorax and there is a more focal collection laterally measuring 5.4 cm may be partially loculated.  -Last admission patient had US guided removal with chest tube placed  -Consult thoracic surgery- s/p VATS on 6/21 CT tube in place draining to suction. Repeat chest xray today will follow up with thoracic surgery recommendation there was a small PTX   - abx MGMT per ID on vanc / zosyn day 9 may stop soon cultures NGTD from 6/21  -6/27 ID recommended stopping abx,pt is afebrile,wbc has been normal.    Abdominal cavity abscess (last admission)  -CT abd 6/17 Pigtail catheter overlies collection in the pelvis anteriorly which has diminished considerably in size since 5/16/2018 and there is a small amount of residual fluid noted  -6/27,drain has around 100cc yellowish fluid  -Will ask surgery to see if they can remove the pigtail    Suspected sepsis  -as above  -blood culture 6/16 alpha Strep in  1/2 bottles  -Repeat blood culture 6/18  Neg so far   - other body fluid cultures NGTD 6/21    ESRD  -on HD (TTS)  -Appreciate Nephrology    Hyperkalemia: rx with dialysis    Anemia  -likely of chronic disease  -on EPO   -Hb 6. 9. Transfused one unit 6/26    Lupus  -Outpatient being followed by Dr Dee Dee Whyte    -d/w Dr. Wesley Gold will increase prednisone to 10 for now and see how she does also can start Imuran but will take few days to kick in    - On prednisone and Plaquenil    Chronic DVT  -on Eliquis resumed after surgery    Elevated troponin  -Likely from ESRD  - Cardiology consulted  -Echo EF 60-65% with no RWMA    Dyslipidemia  -continue home meds    Diet  Renal diet    Hypertension: uncontrolled  Increased Clonidine/ on Hydralazine need volume removal after HD and need pain control  Pall care consulted for pain control    Code status: Full  DVT prophylaxis: Eliquis  PTA: home    Plan: Continue antibiotics, follow cultures, ID and thoracic surgery recommendation    Care Plan discussed with: Patient/Family  Disposition: D [place] Problems  Date Reviewed: 6/21/2018          Codes Class Noted POA    Troponin level elevated ICD-10-CM: R74.8  ICD-9-CM: 790.6  6/17/2018 Unknown        * (Principal)Sepsis ([address] ICD-10-CM: A41.9  ICD-9-CM: 038.9, 995.91  4/29/2018 Yes                Review of Systems:   A comprehensive review of systems was negative except for that written in the HPI. Vital Signs:    Last 24hrs VS reviewed since prior progress note. Most recent are:  Visit Vitals    BP (!) 138/99 (BP 1 Location: Left arm, BP Patient Position: At rest)    Pulse 86    Temp 98.4 °F (36.9 °C)    Resp 18    Ht 5' 5\" (1.651 m)    Wt 69.8 kg (153 lb 14.1 oz)    SpO2 98%    BMI 25.61 kg/m2         Intake/Output Summary (Last 24 hours) at 06/27/18 0846  Last data filed at 06/26/18 1803   Gross per 24 hour   Intake             1000 ml   Output             2510 ml   Net            -1510 ml        Physical Examination:             Constitutional:  No acute distress, cooperative, pleasant    ENT:  Oral mucous moist, oropharynx benign. Neck supple,    Resp:  Rt sided crackles up to mid lung CT tube in place left lung CTA   CV:  Regular rhythm, normal rate, no murmurs, gallops, rubs    GI:  Soft, non distended, non tender. normoactive bowel sounds, no hepatosplenomegaly, RLQ drain in place    Musculoskeletal:  Trace edema, warm, 2+ pulses throughout,Right AV fistula in place    Neurologic:  Moves all extremities.   AAOx3, CN II-XII reviewed     Skin:  Good turgor, no rashes or ulcers       Data Review:    Review and/or order of clinical lab test      Labs:     Recent Labs      06/27/18   0355  06/26/18   0344   WBC  5.1  7.1   HGB  7.2*  6.9*   HCT  23.3*  23.0*   PLT  201  214     Recent Labs 06/27/18 0355 06/26/18 0344  06/25/18 0347   NA  141  139  139   K  4.2  5.2*  4.9   CL  103  102  103   CO2  28  27  26   BUN  35*  45*  36*   CREA  4.74*  6.46*  5.18*   GLU  92  87  89   CA  8.4*  9.2  8.7   MG  2.0  2.2  2.0   PHOS  4.2  5.1*  3.8     Recent Labs      06/27/18 0355 06/26/18 0344 06/25/18 0347   SGOT  12*  16  23   ALT  8*  9*  8*   AP  66  78  60   TBILI  0.5  0.4  0.4   TP  6.0*  6.4  6.4   ALB  1.8*  1.8*  1.9*   GLOB  4.2*  4.6*  4.5*     No results for input(s): INR, PTP, APTT in the last 72 hours. No lab exists for component: INREXT, INREXT   No results for input(s): FE, TIBC, PSAT, FERR in the last 72 hours. Lab Results   Component Value Date/Time    Folate 13.7 06/17/2018 03:00 PM      No results for input(s): PH, PCO2, PO2 in the last 72 hours. No results for input(s): CPK, CKNDX, TROIQ in the last 72 hours.     No lab exists for component: CPKMB  Lab Results   Component Value Date/Time    Cholesterol, total 117 09/25/2015 02:02 PM    HDL Cholesterol 31 (L) 09/25/2015 02:02 PM    LDL, calculated 57 09/25/2015 02:02 PM    Triglyceride 146 09/25/2015 02:02 PM    CHOL/HDL Ratio 7.7 (H) 05/31/2009 10:30 AM     Lab Results   Component Value Date/Time    Glucose (POC) 123 (H) 04/20/2018 05:33 PM    Glucose (POC) 95 04/20/2018 11:19 AM    Glucose (POC) 100 04/20/2018 07:18 AM    Glucose (POC) 119 (H) 04/19/2018 09:11 PM    Glucose (POC) 86 04/19/2018 04:30 PM     Lab Results   Component Value Date/Time    Color YELLOW/STRAW 08/12/2016 09:06 PM    Appearance CLEAR 08/12/2016 09:06 PM    Specific gravity 1.017 08/12/2016 09:06 PM    Specific gravity 1.010 07/11/2016 12:44 PM    pH (UA) 7.0 08/12/2016 09:06 PM    Protein 300 (A) 08/12/2016 09:06 PM    Glucose NEGATIVE  08/12/2016 09:06 PM    Ketone TRACE (A) 08/12/2016 09:06 PM    Bilirubin NEGATIVE  07/11/2016 12:44 PM    Urobilinogen 1.0 08/12/2016 09:06 PM    Nitrites NEGATIVE  08/12/2016 09:06 PM    Leukocyte Esterase NEGATIVE  08/12/2016 09:06 PM    Epithelial cells MODERATE (A) 08/12/2016 09:06 PM    Bacteria 1+ (A) 08/12/2016 09:06 PM    WBC 0-4 08/12/2016 09:06 PM    RBC 0-5 08/12/2016 09:06 PM         Medications Reviewed:     Current Facility-Administered Medications   Medication Dose Route Frequency    0.9% sodium chloride infusion 250 mL  250 mL IntraVENous PRN    hydrALAZINE (APRESOLINE) 20 mg/mL injection 20 mg  20 mg IntraVENous Q6H PRN    cloNIDine HCl (CATAPRES) tablet 0.1 mg  0.1 mg Oral TID    predniSONE (DELTASONE) tablet 10 mg  10 mg Oral DAILY    azaTHIOprine (IMURAN) tablet 50 mg  50 mg Oral DAILY AFTER BREAKFAST    epoetin pia (EPOGEN;PROCRIT) injection 10,000 Units  10,000 Units SubCUTAneous DIALYSIS TUE, THU & SAT    HYDROmorphone (DILAUDID) syringe 0.5 mg  0.5 mg IntraVENous Q4H PRN    oxyCODONE IR (ROXICODONE) tablet 10 mg  10 mg Oral Q4H PRN    haloperidol lactate (HALDOL) injection 2 mg  2 mg IntraVENous Q4H PRN    apixaban (ELIQUIS) tablet 2.5 mg  2.5 mg Oral BID    alteplase (CATHFLO) 1 mg in sterile water (preservative free) 1 mL injection  1 mg InterCATHeter PRN    sodium chloride (NS) flush 10-30 mL  10-30 mL InterCATHeter PRN    sodium chloride (NS) flush 10 mL  10 mL InterCATHeter Q24H    sodium chloride (NS) flush 10 mL  10 mL InterCATHeter PRN    sodium chloride (NS) flush 10-40 mL  10-40 mL InterCATHeter Q8H    sodium chloride (NS) flush 20 mL  20 mL InterCATHeter Q24H    bacitracin 500 unit/gram packet 1 Packet  1 Packet Topical PRN    lactobac ac& pc-s.therm-b.anim (AJIT Q/RISAQUAD)  1 Cap Oral DAILY    senna (SENOKOT) tablet 8.6 mg  1 Tab Oral QHS    sodium chloride (NS) flush 5-10 mL  5-10 mL IntraVENous Q8H    sodium chloride (NS) flush 5-10 mL  5-10 mL IntraVENous PRN    naloxone (NARCAN) injection 0.4 mg  0.4 mg IntraVENous PRN    docusate sodium (COLACE) capsule 100 mg  100 mg Oral BID    albuterol-ipratropium (DUO-NEB) 2.5 MG-0.5 MG/3 ML  3 mL Nebulization Q4H PRN    allopurinol (ZYLOPRIM) tablet 100 mg  100 mg Oral DAILY AFTER BREAKFAST    amitriptyline (ELAVIL) tablet 25 mg  25 mg Oral QHS PRN    amLODIPine (NORVASC) tablet 10 mg  10 mg Oral DAILY    cyanocobalamin (VITAMIN B12) tablet 2,500 mcg  2,500 mcg Oral DAILY    gemfibrozil (LOPID) tablet 300 mg  300 mg Oral DAILY    hydroxychloroquine (PLAQUENIL) tablet 200 mg  200 mg Oral BID    pantoprazole (PROTONIX) tablet 40 mg  40 mg Oral ACB    senna (SENOKOT) tablet 8.6 mg  1 Tab Oral DAILY PRN    ondansetron (ZOFRAN) injection 4 mg  4 mg IntraVENous Q4H PRN    arformoterol (BROVANA) neb solution 15 mcg  15 mcg Nebulization BID RT    And    budesonide (PULMICORT) 500 mcg/2 ml nebulizer suspension  500 mcg Nebulization BID RT    carvedilol (COREG) tablet 25 mg  25 mg Oral BID WITH MEALS    losartan (COZAAR) tablet 50 mg  50 mg Oral DAILY     ______________________________________________________________________  EXPECTED LENGTH OF STAY: 10d 7h  ACTUAL LENGTH OF STAY:          10                 Newton Wheatley MD

## 2018-06-27 NOTE — CONSULTS
[id number] [facility] Surgery at [address] [facility] Consultation    Admit Date: 6/16/2018  Reason for Consultation: abdominal drain    HPI:  Carmen Hopper is a 29 y.o. female whom we are asked to see in consultation to evaluate for abdominal drain removal.      She has a history of peritoneal dialysis for end-stage renal disease, and her dialysis catheter became infected. It was removed, and then she was noted to develop an abscess in her peritoneal cavity. She had a pigtail drain placed by IR, but after several weeks of treatment she continued to have significant pain with nausea and vomiting. She was brought to the OR on 4/12/18 with Dr. Eve Canales for diagnotic laparoscopy with washout and drains placement. She was found to have subacute peritonitis with adhesions and loculated abscess. The abscess recurred post-operatively and IR placed a drain on 5/13/18. She was discharged with this drain in place, and was planning to see Dr. Eve Canales yesterday in the office to evaluate for removal.      She was re-admitted on 6/17/18 with pneumonia, dyspnea, and a complex right pleural fluid collection. She underwent VATS with Dr. Bailee Adam on 6/21/18 for decortication of the RLL. CT of the abd/pelvis was performed on 6/17/18:  Pigtail catheter overlies collection in the pelvis anteriorly which has  diminished considerably in size since 5/16/2018 and there is a small amount of residual fluid noted. The accordion drain output this morning was 50 mL. She is not on antibiotics. WBC 5.1 and she is afebrile. Denies pain in her abdomen.      Patient Active Problem List    Diagnosis Date Noted    Troponin level elevated 06/17/2018    Sepsis ([address] 04/29/2018    Generalized abdominal pain 04/04/2018    Other constipation 04/04/2018    Other ascites 04/04/2018    Peritonitis ([address] 03/11/2018    History of pulmonary embolism 12/08/2017    Hypomagnesemia 10/30/2017    Hypocalcemia 10/30/2017    Hypokalemia 10/27/2017    Hypertension 10/14/2017    Chronic bilateral low back pain without sciatica 2017    Anemia of renal disease 2017    Dependence on peritoneal dialysis ([address] 2017    ACP (advance care planning) 2017    ESRD on peritoneal dialysis ([address] 10/07/2016    Malignant hypertension 2016    Encounter for monitoring opioid maintenance therapy 2015    Lupus nephritis ([address] 03/10/2012    Lupus 2012     Past Medical History:   Diagnosis Date    Anemia     secondary to lupus    Asthma     no inhaler use in past 2 to 3 years    Carditis     Chronic kidney disease     ESRD    Chronic pain     DDD (degenerative disc disease), lumbar     ESRD (end stage renal disease) ([address]     GERD (gastroesophageal reflux disease)     Heart failure ([address]     Hemodialysis patient ([address] 2017    [address]  Tuesday,  Thursday,  and Saturday. 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  Poor historian 2018    With medications    Thromboembolus ([address] 2013    lungs    Transfusion history     Last Transfusion 2017  at [facility]      Past Surgical History:   Procedure Laterality Date    HX  SECTION  11/2006    x1    HX OTHER SURGICAL  9/16/15    INSERTION PD CATH;  Removed 2017    HX VASCULAR ACCESS Right 2017    Double-Lumen henry catheter upper chest      Social History   Substance Use Topics    Smoking status: Never Smoker    Smokeless tobacco: Never Used    Alcohol use No      Family History   Problem Relation Age of Onset    Diabetes Father     Hypertension Father     Cancer Other      aunt with breast cancer    Diabetes Mother       Prior to Admission medications    Medication Sig Start Date End Date Taking? Authorizing Provider   oxyCODONE IR (ROXICODONE) 5 mg immediate release tablet Take 1 tab in AM and HALF to 1 tab in PM if needed on days where back pain is severe  6/5/18   Chinedu Josue NP   amoxicillin 500 mg tab Take 500 mg by mouth two (2) times a day. Historical Provider   senna (SENNA) 8.6 mg tablet Take 1 Tab by mouth daily as needed for Constipation. for constipation    Historical Provider   apixaban (ELIQUIS) 5 mg tablet Take 1 Tab by mouth every twelve (12) hours. 4/30/18   Jennifer Beavers MD   amitriptyline (ELAVIL) 25 mg tablet Take 25 mg by mouth nightly as needed for Sleep. Historical Provider   amLODIPine (NORVASC) 5 mg tablet Take 1 Tab by mouth daily. Patient taking differently: Take 10 mg by mouth daily. 4/21/18   Jennifer Beavers MD   polyethylene glycol (MIRALAX) 17 gram packet Take 1 Packet by mouth daily. 4/21/18   Jennifer Beavers MD   predniSONE (DELTASONE) 5 mg tablet Take 5 mg by mouth daily. Historical Provider   fluticasone-vilanterol (BREO ELLIPTA) 200-25 mcg/dose inhaler Take 1 Puff by inhalation daily. Rinse mouth out after use 12/8/17   Zamzam Crain NP   gemfibrozil (LOPID) 600 mg tablet Take 300 mg by mouth daily. 11/3/17   Historical Provider   carvedilol (COREG) 6.25 mg tablet Take 12.5 mg by mouth two (2) times daily (with meals). Historical Provider   azaTHIOprine (IMURAN) 50 mg tablet Take 50 mg by mouth daily (after breakfast). 11/3/17   Historical Provider   albuterol (PROVENTIL HFA, VENTOLIN HFA, PROAIR HFA) 90 mcg/actuation inhaler Take 1-2 Puffs by inhalation every four (4) hours as needed for Wheezing or Shortness of Breath. 10/30/17   Hamilton Dolan NP   hydroxychloroquine (PLAQUENIL) 200 mg tablet Take 200 mg by mouth two (2) times a day.     Darnell Franks MD   allopurinol (ZYLOPRIM) 100 mg tablet Take 100 mg by mouth daily (after breakfast). Needs to TAKE on a full stomach; will cause her to be nauseated. 10/15/15   Historical Provider   cyanocobalamin (VITAMIN B-12) 2,500 mcg sublingual tablet Take 1 Tab by mouth daily. 10/27/15   Maria Esther Fraga MD   pantoprazole (PROTONIX) 40 mg tablet Take 1 Tab by mouth Daily (before breakfast).  10/23/15   Mayra Olmos MD     Current Facility-Administered Medications   Medication Dose Route Frequency    0.9% sodium chloride infusion 250 mL  250 mL IntraVENous PRN    hydrALAZINE (APRESOLINE) 20 mg/mL injection 20 mg  20 mg IntraVENous Q6H PRN    cloNIDine HCl (CATAPRES) tablet 0.1 mg  0.1 mg Oral TID    predniSONE (DELTASONE) tablet 10 mg  10 mg Oral DAILY    azaTHIOprine (IMURAN) tablet 50 mg  50 mg Oral DAILY AFTER BREAKFAST    epoetin pia (EPOGEN;PROCRIT) injection 10,000 Units  10,000 Units SubCUTAneous DIALYSIS TUE, THU & SAT    HYDROmorphone (DILAUDID) syringe 0.5 mg  0.5 mg IntraVENous Q4H PRN    oxyCODONE IR (ROXICODONE) tablet 10 mg  10 mg Oral Q4H PRN    haloperidol lactate (HALDOL) injection 2 mg  2 mg IntraVENous Q4H PRN    apixaban (ELIQUIS) tablet 2.5 mg  2.5 mg Oral BID    alteplase (CATHFLO) 1 mg in sterile water (preservative free) 1 mL injection  1 mg InterCATHeter PRN    sodium chloride (NS) flush 10-30 mL  10-30 mL InterCATHeter PRN    sodium chloride (NS) flush 10 mL  10 mL InterCATHeter Q24H    sodium chloride (NS) flush 10 mL  10 mL InterCATHeter PRN    sodium chloride (NS) flush 10-40 mL  10-40 mL InterCATHeter Q8H    sodium chloride (NS) flush 20 mL  20 mL InterCATHeter Q24H    bacitracin 500 unit/gram packet 1 Packet  1 Packet Topical PRN    lactobac ac& pc-s.therm-b.anim (AJIT Q/RISAQUAD)  1 Cap Oral DAILY    senna (SENOKOT) tablet 8.6 mg  1 Tab Oral QHS    sodium chloride (NS) flush 5-10 mL  5-10 mL IntraVENous Q8H    sodium chloride (NS) flush 5-10 mL  5-10 mL IntraVENous PRN    naloxone (NARCAN) injection 0.4 mg  0.4 mg IntraVENous PRN    docusate sodium (COLACE) capsule 100 mg  100 mg Oral BID    albuterol-ipratropium (DUO-NEB) 2.5 MG-0.5 MG/3 ML  3 mL Nebulization Q4H PRN    allopurinol (ZYLOPRIM) tablet 100 mg  100 mg Oral DAILY AFTER BREAKFAST    amitriptyline (ELAVIL) tablet 25 mg  25 mg Oral QHS PRN    amLODIPine (NORVASC) tablet 10 mg  10 mg Oral DAILY    cyanocobalamin (VITAMIN B12) tablet 2,500 mcg  2,500 mcg Oral DAILY    gemfibrozil (LOPID) tablet 300 mg  300 mg Oral DAILY    hydroxychloroquine (PLAQUENIL) tablet 200 mg  200 mg Oral BID    pantoprazole (PROTONIX) tablet 40 mg  40 mg Oral ACB    senna (SENOKOT) tablet 8.6 mg  1 Tab Oral DAILY PRN    ondansetron (ZOFRAN) injection 4 mg  4 mg IntraVENous Q4H PRN    arformoterol (BROVANA) neb solution 15 mcg  15 mcg Nebulization BID RT    And    budesonide (PULMICORT) 500 mcg/2 ml nebulizer suspension  500 mcg Nebulization BID RT    carvedilol (COREG) tablet 25 mg  25 mg Oral BID WITH MEALS    losartan (COZAAR) tablet 50 mg  50 mg Oral DAILY     Allergies   Allergen Reactions    Compazine [Prochlorperazine Edisylate] Nausea and Vomiting and Palpitations          Subjective:     Review of Systems:    A comprehensive review of systems was negative except for that written in the History of Present Illness. Objective:     Blood pressure 135/86, pulse 84, temperature 98.5 °F (36.9 °C), resp. rate 16, height 5' 5\" (1.651 m), weight 69.8 kg (153 lb 14.1 oz), SpO2 98 %.   Temp (24hrs), Av.8 °F (37.1 °C), Min:98.3 °F (36.8 °C), Max:99.3 °F (37.4 °C)      Recent Labs      18   0355  18   0344  18   0347   WBC  5.1  7.1  9.1   HGB  7.2*  6.9*  7.2*   HCT  23.3*  23.0*  24.1*   PLT  201  214  199     Recent Labs      18   0355  18   0344  18   0347   NA  141  139  139   K  4.2  5.2*  4.9   CL  103  102  103   CO2  28  27  26   GLU  92  87  89   BUN  35*  45*  36*   CREA  4.74*  6.46*  5.18*   CA  8.4*  9.2  8.7   MG  2.0  2.2  2.0   PHOS  4.2 5. 1*  3.8   ALB  1.8*  1.8*  1.9*   TBILI  0.5  0.4  0.4   SGOT  12*  16  23   ALT  8*  9*  8*     No results for input(s): AML, LPSE in the last 72 hours. Intake/Output Summary (Last 24 hours) at 06/27/18 1109  Last data filed at 06/27/18 0841   Gross per 24 hour   Intake             1000 ml   Output             2560 ml   Net            -1560 ml       Date 06/27/18 0700 - 06/28/18 0659   Shift 5141-8969 7560-2095 [id number] 24 Hour Total   I  N  T  A  K  E   Shift Total  (mL/kg)       O  U  T  P  U  T   Drains 50   50    Shift Total  (mL/kg) 50  (0.7)   50  (0.7)   Weight (kg) 69.8 69.8 69.8 69.8     _____________________  Physical Exam:     General:  Alert, cooperative, no distress, appears stated age. Eyes:   PERRL, EOMs intact. Sclera clear. Throat: Lips, mucosa, and tongue normal.   Neck: Supple, symmetrical, trachea midline. Lungs:   Clear to auscultation bilaterally. Heart:  Regular rate and rhythm. Abdomen:   Normal BS, flat, Soft, non-tender. No masses,  No organomegaly. Extremities: Extremities normal, atraumatic, no cyanosis or edema. Skin: Skin color, texture, turgor normal. No rashes or lesions. Assessment:   Principal Problem:    Sepsis ([address] (4/29/2018)    Active Problems:    Troponin level elevated (6/17/2018)    abdominal abscess, s/p IR drainage, surgical washout, and repeat IR drainage. Plan:     Removal recs per Dr. Andre Ferrell, emma ATWOOD.   _____ADDENDUM___________  D/W Dr. Andre Ferrell  Would leave drain in place until she can f/u with Dr. Romain Dan in office, or he can see her next week in the hospital if she is still admitted. ______ADDENDUM_________________  D/W Dr. Elie Diop can be removed  Follow-up with Dr. Romain Dan in two weeks    Thank you for allowing us to participate in the care of this patient. Total time spent with patient and records review: 35 minutes.     Signed By: Travis Noel NP     June 27, 2018

## 2018-06-27 NOTE — PROGRESS NOTES
As per NP note. Ms. Smith Player reports no abdominal pain this PM. Drain output has been decreasing. Of note, she was scheduled to see Dr. Henna Dickson on 6/26/2018 for drain removal.  Tm: 99.1 HR: 92 BP: 104/66 Resp Rate: 20 97% sat on room air. Exam: Cor - RRR. Lungs - Bilateral breath sounds. Clear to auscultation. Abdomen - Soft. Non distended. Tender. No guarding or rebound. Percutaneous drain with serosanguinous appearing fluid. Labs - Reviewed. Will ask IR to remove drain. Diet as tolerated. HD per Nephrology. Off abx per ID. Thoracic Surgery following. Plans per Debeb.

## 2018-06-27 NOTE — PROGRESS NOTES
Bedside shift change report given to Geoffrey Tellez (oncoming nurse) by Lynne Mcmahon (offgoing nurse). Report included the following information SBAR, Kardex, Procedure Summary, Intake/Output, MAR, Accordion, Recent Results, Med Rec Status and Cardiac Rhythm NSR.

## 2018-06-27 NOTE — PROGRESS NOTES
Bedside shift change report given to Tennille Zaragoza RN (oncoming nurse) by Shade Quach (offgoing nurse). Report included the following information SBAR, Procedure Summary, Intake/Output, MAR and Recent Results.

## 2018-06-27 NOTE — PROGRESS NOTES
Patient was discussed during IDR this am. Patient hgb down to 6.9, was transfused with 1 UPRBC yesterday during dialysis. Vanc stopped. Patient is a S/P VATS by Dr. Matthew Vidal on 6/21/18. CXR still showed a small pneumothorax. Patient will need home care orders at discharge. CM will for discharge needs.  Lieutenant Jeff MSA, RN, CRM

## 2018-06-27 NOTE — PROGRESS NOTES
Problem: Falls - Risk of  Goal: *Absence of Falls  Document Alex Fall Risk and appropriate interventions in the flowsheet.    Outcome: Progressing Towards Goal  Fall Risk Interventions:  Mobility Interventions: Patient to call before getting OOB         Medication Interventions: Patient to call before getting OOB, Teach patient to arise slowly    Elimination Interventions: Call light in reach    History of Falls Interventions: Door open when patient unattended

## 2018-06-27 NOTE — PROGRESS NOTES
Sarah Kaufman        :  1985        MRN:  [id number]        Assessment :    Plan:  - End-stage renal disease - TTS - [facility]     SLE    Anemia    PNA/Right paraneumonic loculated effusion-> s/p VATS      -HD tomorrow. Will consider additional PRBC if Hgb drops    Ct current antihypertensive regimen    Hgb remains sub-optimal. Continue EPO. Ct Nepro    Abd drain to be left in place    IV Abx               Subjective:     Chief Complaint: Chest tube removed yesterday-> sore still. 1 unit PRBC last night. No SOB. No n/v.     Review of Systems:    Symptom Y/N Comments  Symptom Y/N Comments   Fever/Chills    Chest Pain     Poor Appetite    Edema     Cough    Abdominal Pain     Sputum    Joint Pain     SOB/IVY    Pruritis/Rash     Nausea/vomit    Tolerating PT/OT     Diarrhea    Tolerating Diet     Constipation    Other       Could not obtain due to:      Objective:     VITALS:   Last 24hrs VS reviewed since prior progress note. Most recent are:  Visit Vitals    /86 (BP 1 Location: Left arm, BP Patient Position: At rest)    Pulse 84    Temp 98.5 °F (36.9 °C)    Resp 16    Ht 5' 5\" (1.651 m)    Wt 69.8 kg (153 lb 14.1 oz)    SpO2 98%    BMI 25.61 kg/m2       Intake/Output Summary (Last 24 hours) at 18 1317  Last data filed at 18 0841   Gross per 24 hour   Intake              880 ml   Output               50 ml   Net              830 ml      Telemetry Reviewed:     PHYSICAL EXAM:  General: NAD  CTA  Right chest tube  No LE edema.  RUE edema      Lab Data Reviewed: (see below)    Medications Reviewed: (see below)    PMH/SH reviewed - no change compared to H&P  ________________________________________________________________________  Care Plan discussed with:  Patient Y    Family      HD RN Y    Care Manager                    Consultant:          Comments   >50% of visit spent in counseling and coordination of care       ________________________________________________________________________  Dino Rodriguez MD     Procedures: see electronic medical records for all procedures/Xrays and details which  were not copied into this note but were reviewed prior to creation of Plan. LABS:  Recent Labs      06/27/18 0355 06/26/18 0344   WBC  5.1  7.1   HGB  7.2*  6.9*   HCT  23.3*  23.0*   PLT  201  214     Recent Labs      06/27/18 0355 06/26/18 0344 06/25/18 0347   NA  141  139  139   K  4.2  5.2*  4.9   CL  103  102  103   CO2  28  27  26   BUN  35*  45*  36*   CREA  4.74*  6.46*  5.18*   GLU  92  87  89   CA  8.4*  9.2  8.7   MG  2.0  2.2  2.0   PHOS  4.2  5.1*  3.8     Recent Labs      06/27/18 0355 06/26/18 0344 06/25/18 0347   SGOT  12*  16  23   AP  66  78  60   TP  6.0*  6.4  6.4   ALB  1.8*  1.8*  1.9*   GLOB  4.2*  4.6*  4.5*     No results for input(s): INR, PTP, APTT in the last 72 hours. No lab exists for component: INREXT, INREXT   No results for input(s): FE, TIBC, PSAT, FERR in the last 72 hours. Lab Results   Component Value Date/Time    Folate 13.7 06/17/2018 03:00 PM      No results for input(s): PH, PCO2, PO2 in the last 72 hours. No results for input(s): CPK, CKMB in the last 72 hours.     No lab exists for component: TROPONINI  No components found for: [place]  Lab Results   Component Value Date/Time    Color YELLOW/STRAW 08/12/2016 09:06 PM    Appearance CLEAR 08/12/2016 09:06 PM    Specific gravity 1.017 08/12/2016 09:06 PM    Specific gravity 1.010 07/11/2016 12:44 PM    pH (UA) 7.0 08/12/2016 09:06 PM    Protein 300 (A) 08/12/2016 09:06 PM    Glucose NEGATIVE  08/12/2016 09:06 PM    Ketone TRACE (A) 08/12/2016 09:06 PM    Bilirubin NEGATIVE  07/11/2016 12:44 PM    Urobilinogen 1.0 08/12/2016 09:06 PM    Nitrites NEGATIVE  08/12/2016 09:06 PM    Leukocyte Esterase NEGATIVE  08/12/2016 09:06 PM    Epithelial cells MODERATE (A) 08/12/2016 09:06 PM Bacteria 1+ (A) 08/12/2016 09:06 PM    WBC 0-4 08/12/2016 09:06 PM    RBC 0-5 08/12/2016 09:06 PM       MEDICATIONS:  Current Facility-Administered Medications   Medication Dose Route Frequency    0.9% sodium chloride infusion 250 mL  250 mL IntraVENous PRN    hydrALAZINE (APRESOLINE) 20 mg/mL injection 20 mg  20 mg IntraVENous Q6H PRN    cloNIDine HCl (CATAPRES) tablet 0.1 mg  0.1 mg Oral TID    predniSONE (DELTASONE) tablet 10 mg  10 mg Oral DAILY    azaTHIOprine (IMURAN) tablet 50 mg  50 mg Oral DAILY AFTER BREAKFAST    epoetin pia (EPOGEN;PROCRIT) injection 10,000 Units  10,000 Units SubCUTAneous DIALYSIS TUE, THU & SAT    HYDROmorphone (DILAUDID) syringe 0.5 mg  0.5 mg IntraVENous Q4H PRN    oxyCODONE IR (ROXICODONE) tablet 10 mg  10 mg Oral Q4H PRN    haloperidol lactate (HALDOL) injection 2 mg  2 mg IntraVENous Q4H PRN    apixaban (ELIQUIS) tablet 2.5 mg  2.5 mg Oral BID    alteplase (CATHFLO) 1 mg in sterile water (preservative free) 1 mL injection  1 mg InterCATHeter PRN    sodium chloride (NS) flush 10-30 mL  10-30 mL InterCATHeter PRN    sodium chloride (NS) flush 10 mL  10 mL InterCATHeter Q24H    sodium chloride (NS) flush 10 mL  10 mL InterCATHeter PRN    sodium chloride (NS) flush 10-40 mL  10-40 mL InterCATHeter Q8H    sodium chloride (NS) flush 20 mL  20 mL InterCATHeter Q24H    bacitracin 500 unit/gram packet 1 Packet  1 Packet Topical PRN    lactobac ac& pc-s.therm-b.anim (AJIT Q/RISAQUAD)  1 Cap Oral DAILY    senna (SENOKOT) tablet 8.6 mg  1 Tab Oral QHS    sodium chloride (NS) flush 5-10 mL  5-10 mL IntraVENous Q8H    sodium chloride (NS) flush 5-10 mL  5-10 mL IntraVENous PRN    naloxone (NARCAN) injection 0.4 mg  0.4 mg IntraVENous PRN    docusate sodium (COLACE) capsule 100 mg  100 mg Oral BID    albuterol-ipratropium (DUO-NEB) 2.5 MG-0.5 MG/3 ML  3 mL Nebulization Q4H PRN    allopurinol (ZYLOPRIM) tablet 100 mg  100 mg Oral DAILY AFTER BREAKFAST    amitriptyline (ELAVIL) tablet 25 mg  25 mg Oral QHS PRN    amLODIPine (NORVASC) tablet 10 mg  10 mg Oral DAILY    cyanocobalamin (VITAMIN B12) tablet 2,500 mcg  2,500 mcg Oral DAILY    gemfibrozil (LOPID) tablet 300 mg  300 mg Oral DAILY    hydroxychloroquine (PLAQUENIL) tablet 200 mg  200 mg Oral BID    pantoprazole (PROTONIX) tablet 40 mg  40 mg Oral ACB    senna (SENOKOT) tablet 8.6 mg  1 Tab Oral DAILY PRN    ondansetron (ZOFRAN) injection 4 mg  4 mg IntraVENous Q4H PRN    arformoterol (BROVANA) neb solution 15 mcg  15 mcg Nebulization BID RT    And    budesonide (PULMICORT) 500 mcg/2 ml nebulizer suspension  500 mcg Nebulization BID RT    carvedilol (COREG) tablet 25 mg  25 mg Oral BID WITH MEALS    losartan (COZAAR) tablet 50 mg  50 mg Oral DAILY

## 2018-06-27 NOTE — PROGRESS NOTES
Patient arrived to floor from [facility] via bed. Patient confirmed all belongings were with her. VS taken and stable. Patient reports pain rating at 5/10 with goal of 3. Advised would review medication availability. Denies needs at present. NP with General Surgery in to assess patient.   Call bell and phone within reach

## 2018-06-28 VITALS
SYSTOLIC BLOOD PRESSURE: 129 MMHG | OXYGEN SATURATION: 99 % | WEIGHT: 154.54 LBS | HEIGHT: 65 IN | RESPIRATION RATE: 20 BRPM | HEART RATE: 93 BPM | BODY MASS INDEX: 25.75 KG/M2 | DIASTOLIC BLOOD PRESSURE: 85 MMHG | TEMPERATURE: 98.3 F

## 2018-06-28 LAB
ALBUMIN SERPL-MCNC: 1.8 G/DL (ref 3.5–5)
ALBUMIN/GLOB SERPL: 0.4 {RATIO} (ref 1.1–2.2)
ALP SERPL-CCNC: 65 U/L (ref 45–117)
ALT SERPL-CCNC: 8 U/L (ref 12–78)
ANION GAP SERPL CALC-SCNC: 10 MMOL/L (ref 5–15)
AST SERPL-CCNC: 13 U/L (ref 15–37)
BASOPHILS # BLD: 0 K/UL (ref 0–0.1)
BASOPHILS NFR BLD: 1 % (ref 0–1)
BILIRUB SERPL-MCNC: 0.3 MG/DL (ref 0.2–1)
BUN SERPL-MCNC: 47 MG/DL (ref 6–20)
BUN/CREAT SERPL: 8 (ref 12–20)
CALCIUM SERPL-MCNC: 8.4 MG/DL (ref 8.5–10.1)
CHLORIDE SERPL-SCNC: 102 MMOL/L (ref 97–108)
CO2 SERPL-SCNC: 26 MMOL/L (ref 21–32)
CREAT SERPL-MCNC: 5.95 MG/DL (ref 0.55–1.02)
DIFFERENTIAL METHOD BLD: ABNORMAL
EOSINOPHIL # BLD: 0.1 K/UL (ref 0–0.4)
EOSINOPHIL NFR BLD: 3 % (ref 0–7)
ERYTHROCYTE [DISTWIDTH] IN BLOOD BY AUTOMATED COUNT: 18.6 % (ref 11.5–14.5)
GLOBULIN SER CALC-MCNC: 4.2 G/DL (ref 2–4)
GLUCOSE SERPL-MCNC: 99 MG/DL (ref 65–100)
HCT VFR BLD AUTO: 23 % (ref 35–47)
HCT VFR BLD AUTO: 28.2 % (ref 35–47)
HGB BLD-MCNC: 6.9 G/DL (ref 11.5–16)
HGB BLD-MCNC: 8.9 G/DL (ref 11.5–16)
IMM GRANULOCYTES # BLD: 0 K/UL (ref 0–0.04)
IMM GRANULOCYTES NFR BLD AUTO: 0 % (ref 0–0.5)
LYMPHOCYTES # BLD: 1 K/UL (ref 0.8–3.5)
LYMPHOCYTES NFR BLD: 25 % (ref 12–49)
MAGNESIUM SERPL-MCNC: 2.1 MG/DL (ref 1.6–2.4)
MCH RBC QN AUTO: 27.2 PG (ref 26–34)
MCHC RBC AUTO-ENTMCNC: 30 G/DL (ref 30–36.5)
MCV RBC AUTO: 90.6 FL (ref 80–99)
MONOCYTES # BLD: 0.4 K/UL (ref 0–1)
MONOCYTES NFR BLD: 11 % (ref 5–13)
NEUTS SEG # BLD: 2.4 K/UL (ref 1.8–8)
NEUTS SEG NFR BLD: 60 % (ref 32–75)
NRBC # BLD: 0 K/UL (ref 0–0.01)
NRBC BLD-RTO: 0 PER 100 WBC
PHOSPHATE SERPL-MCNC: 4.7 MG/DL (ref 2.6–4.7)
PLATELET # BLD AUTO: 214 K/UL (ref 150–400)
PMV BLD AUTO: 10 FL (ref 8.9–12.9)
POTASSIUM SERPL-SCNC: 4.5 MMOL/L (ref 3.5–5.1)
PROT SERPL-MCNC: 6 G/DL (ref 6.4–8.2)
RBC # BLD AUTO: 2.54 M/UL (ref 3.8–5.2)
RBC MORPH BLD: ABNORMAL
RBC MORPH BLD: ABNORMAL
SODIUM SERPL-SCNC: 138 MMOL/L (ref 136–145)
WBC # BLD AUTO: 3.9 K/UL (ref 3.6–11)

## 2018-06-28 PROCEDURE — 36430 TRANSFUSION BLD/BLD COMPNT: CPT

## 2018-06-28 PROCEDURE — 85025 COMPLETE CBC W/AUTO DIFF WBC: CPT | Performed by: HOSPITALIST

## 2018-06-28 PROCEDURE — 74011250636 HC RX REV CODE- 250/636: Performed by: NURSE PRACTITIONER

## 2018-06-28 PROCEDURE — 74011636637 HC RX REV CODE- 636/637: Performed by: HOSPITALIST

## 2018-06-28 PROCEDURE — 74011250637 HC RX REV CODE- 250/637: Performed by: HOSPITALIST

## 2018-06-28 PROCEDURE — 74011250637 HC RX REV CODE- 250/637: Performed by: INTERNAL MEDICINE

## 2018-06-28 PROCEDURE — 74011250637 HC RX REV CODE- 250/637: Performed by: SPECIALIST

## 2018-06-28 PROCEDURE — 74011250636 HC RX REV CODE- 250/636: Performed by: INTERNAL MEDICINE

## 2018-06-28 PROCEDURE — 90935 HEMODIALYSIS ONE EVALUATION: CPT

## 2018-06-28 PROCEDURE — 80053 COMPREHEN METABOLIC PANEL: CPT | Performed by: INTERNAL MEDICINE

## 2018-06-28 PROCEDURE — P9016 RBC LEUKOCYTES REDUCED: HCPCS | Performed by: HOSPITALIST

## 2018-06-28 PROCEDURE — 85018 HEMOGLOBIN: CPT | Performed by: HOSPITALIST

## 2018-06-28 PROCEDURE — 74011250637 HC RX REV CODE- 250/637: Performed by: THORACIC SURGERY (CARDIOTHORACIC VASCULAR SURGERY)

## 2018-06-28 PROCEDURE — 36415 COLL VENOUS BLD VENIPUNCTURE: CPT | Performed by: HOSPITALIST

## 2018-06-28 PROCEDURE — 74011250637 HC RX REV CODE- 250/637: Performed by: NURSE PRACTITIONER

## 2018-06-28 PROCEDURE — 74011250636 HC RX REV CODE- 250/636: Performed by: HOSPITALIST

## 2018-06-28 PROCEDURE — 84100 ASSAY OF PHOSPHORUS: CPT | Performed by: INTERNAL MEDICINE

## 2018-06-28 PROCEDURE — 83735 ASSAY OF MAGNESIUM: CPT | Performed by: INTERNAL MEDICINE

## 2018-06-28 RX ORDER — PREDNISONE 5 MG/1
10 TABLET ORAL DAILY
Qty: 30 TAB | Refills: 0 | Status: SHIPPED
Start: 2018-06-28

## 2018-06-28 RX ORDER — CARVEDILOL 25 MG/1
25 TABLET ORAL 2 TIMES DAILY WITH MEALS
Qty: 60 TAB | Refills: 0 | Status: SHIPPED | OUTPATIENT
Start: 2018-06-28 | End: 2018-07-19

## 2018-06-28 RX ORDER — HYDROMORPHONE HYDROCHLORIDE 2 MG/1
2 TABLET ORAL
Qty: 14 TAB | Refills: 0 | Status: SHIPPED | OUTPATIENT
Start: 2018-06-28 | End: 2018-07-02

## 2018-06-28 RX ORDER — SODIUM CHLORIDE 9 MG/ML
250 INJECTION, SOLUTION INTRAVENOUS AS NEEDED
Status: DISCONTINUED | OUTPATIENT
Start: 2018-06-28 | End: 2018-06-28 | Stop reason: HOSPADM

## 2018-06-28 RX ORDER — LOSARTAN POTASSIUM 50 MG/1
50 TABLET ORAL DAILY
Qty: 30 TAB | Refills: 0 | Status: SHIPPED | OUTPATIENT
Start: 2018-06-29 | End: 2018-07-29

## 2018-06-28 RX ORDER — AMLODIPINE BESYLATE 5 MG/1
10 TABLET ORAL DAILY
Qty: 30 TAB | Refills: 0 | Status: SHIPPED
Start: 2018-06-28 | End: 2018-07-19

## 2018-06-28 RX ORDER — CLONIDINE HYDROCHLORIDE 0.1 MG/1
0.1 TABLET ORAL 3 TIMES DAILY
Qty: 90 TAB | Refills: 0 | Status: ON HOLD | OUTPATIENT
Start: 2018-06-28 | End: 2018-07-22

## 2018-06-28 RX ORDER — BACITRACIN 500 UNIT/G
PACKET (EA) TOPICAL
Status: DISCONTINUED
Start: 2018-06-28 | End: 2018-06-28 | Stop reason: HOSPADM

## 2018-06-28 RX ADMIN — GEMFIBROZIL 300 MG: 600 TABLET ORAL at 10:55

## 2018-06-28 RX ADMIN — HYDROMORPHONE HYDROCHLORIDE 0.5 MG: 1 INJECTION, SOLUTION INTRAMUSCULAR; INTRAVENOUS; SUBCUTANEOUS at 15:32

## 2018-06-28 RX ADMIN — APIXABAN 2.5 MG: 2.5 TABLET, FILM COATED ORAL at 10:54

## 2018-06-28 RX ADMIN — OXYCODONE HYDROCHLORIDE 10 MG: 5 TABLET ORAL at 10:15

## 2018-06-28 RX ADMIN — DOCUSATE SODIUM 100 MG: 100 CAPSULE, LIQUID FILLED ORAL at 10:56

## 2018-06-28 RX ADMIN — CLONIDINE HYDROCHLORIDE 0.1 MG: 0.1 TABLET ORAL at 15:15

## 2018-06-28 RX ADMIN — HYDROXYCHLOROQUINE SULFATE 200 MG: 200 TABLET, FILM COATED ORAL at 10:54

## 2018-06-28 RX ADMIN — HYDROXYCHLOROQUINE SULFATE 200 MG: 200 TABLET, FILM COATED ORAL at 18:08

## 2018-06-28 RX ADMIN — APIXABAN 2.5 MG: 2.5 TABLET, FILM COATED ORAL at 18:10

## 2018-06-28 RX ADMIN — Medication 1 CAPSULE: at 10:54

## 2018-06-28 RX ADMIN — LOSARTAN POTASSIUM 50 MG: 50 TABLET ORAL at 09:52

## 2018-06-28 RX ADMIN — DOCUSATE SODIUM 100 MG: 100 CAPSULE, LIQUID FILLED ORAL at 18:09

## 2018-06-28 RX ADMIN — Medication 2500 MCG: at 10:54

## 2018-06-28 RX ADMIN — HYDROMORPHONE HYDROCHLORIDE 0.5 MG: 1 INJECTION, SOLUTION INTRAMUSCULAR; INTRAVENOUS; SUBCUTANEOUS at 11:12

## 2018-06-28 RX ADMIN — CLONIDINE HYDROCHLORIDE 0.1 MG: 0.1 TABLET ORAL at 10:54

## 2018-06-28 RX ADMIN — AMLODIPINE BESYLATE 10 MG: 5 TABLET ORAL at 09:52

## 2018-06-28 RX ADMIN — Medication 10 ML: at 13:52

## 2018-06-28 RX ADMIN — PREDNISONE 10 MG: 10 TABLET ORAL at 10:55

## 2018-06-28 RX ADMIN — CARVEDILOL 25 MG: 12.5 TABLET, FILM COATED ORAL at 18:09

## 2018-06-28 RX ADMIN — OXYCODONE HYDROCHLORIDE 10 MG: 5 TABLET ORAL at 19:40

## 2018-06-28 RX ADMIN — HYDROMORPHONE HYDROCHLORIDE 0.5 MG: 1 INJECTION, SOLUTION INTRAMUSCULAR; INTRAVENOUS; SUBCUTANEOUS at 06:00

## 2018-06-28 RX ADMIN — EPOETIN ALFA 10000 UNITS: 10000 SOLUTION INTRAVENOUS; SUBCUTANEOUS at 18:10

## 2018-06-28 RX ADMIN — CARVEDILOL 25 MG: 12.5 TABLET, FILM COATED ORAL at 10:54

## 2018-06-28 RX ADMIN — AZATHIOPRINE 50 MG: 50 TABLET ORAL at 11:11

## 2018-06-28 RX ADMIN — PANTOPRAZOLE SODIUM 40 MG: 40 TABLET, DELAYED RELEASE ORAL at 06:08

## 2018-06-28 RX ADMIN — ALLOPURINOL 100 MG: 100 TABLET ORAL at 10:55

## 2018-06-28 NOTE — PROGRESS NOTES
Bedside shift change report given to Israel Stroud (oncoming nurse) by Linda Soto RN (offgoing nurse). Report included the following information SBAR, Kardex, Intake/Output and Recent Results.

## 2018-06-28 NOTE — PROGRESS NOTES
Bedside shift change report given to Robin De Anda (oncoming nurse) by Marta Samuel RN (offgoing nurse). Report included the following information SBAR, Intake/Output and MAR.

## 2018-06-28 NOTE — PROGRESS NOTES
Note patient transfer to [address] [place] [address].  [address] is set to resume [facility] services upon [address] [place] [address] discharge, orders have already been written and received, agency info is on AVS.    DERRELL Bach

## 2018-06-28 NOTE — PROGRESS NOTES
Reviewed discharge instructions with patient and family. No questions were asked.     Will call for trasport to discharge hospital.

## 2018-06-28 NOTE — PROGRESS NOTES
[facility] Dialysis Team [facility]  [phone]    Vitals   Pre   Post   Assessment   Pre   Post     Temp  Temp: 98.3 °F (36.8 °C) (pre tx ) (06/28/18 0700)  98.3 LOC  A/O x 3 A/O x 3   HR   89 93 Lungs   Diminished  Diminished    B/P   130/86 172/118 ( B/P meds given)  Cardiac   Regular Regualar    Resp   20 18 Skin   Warm, Dry  Warm, Dry    Pain level  3- requested no pain med  7- Pain med given Edema  RLE & RLE +2 non- pitting   RLE & RLE +2 non pitting    Orders:    Duration:   Start:    1819 End:    1105 Total:   3.5hr   Dialyzer:   Dialyzer/Set Up Inspection: Revaclear (06/28/18 0700)   K Bath:   Dialysate K (mEq/L): 3 (06/28/18 0700)   Ca Bath:   Dialysate CA (mEq/L): 2.5 (06/28/18 0700)   Na/Bicarb:   Dialysate NA (mEq/L): 140 (06/28/18 0700)   Target Fluid Removal:   Goal/Amount of Fluid to Remove (mL): 2500 mL (06/28/18 0700)   Access     Type & Location:   RUG, +b/+t, swollen, prepped per protocol, cannulated with 15G needles. Post- +B/+T, Pulled needles x 2, achieved hemostasis. Labs     Obtained/Reviewed   Critical Results Called   Date when labs were drawn-  Hgb-    HGB   Date Value Ref Range Status   06/28/2018 6.9 (L) 11.5 - 16.0 g/dL Final     K-    Potassium   Date Value Ref Range Status   06/28/2018 4.5 3.5 - 5.1 mmol/L Final     Ca-   Calcium   Date Value Ref Range Status   06/28/2018 8.4 (L) 8.5 - 10.1 MG/DL Final     Bun-   BUN   Date Value Ref Range Status   06/28/2018 47 (H) 6 - 20 MG/DL Final     Creat-   Creatinine   Date Value Ref Range Status   06/28/2018 5.95 (H) 0.55 - 1.02 MG/DL Final        Medications/ Blood Products Given     Name   Dose   Route and Time     2 Units of RBCs                Blood Volume Processed (BVP):    73.5L Net Fluid   Removed:  2500ml   Comments   Time Out Done:  0730  Primary Nurse Rpt Pre: FEDERICA Parkinson RN/FEDERICA Aggarwal, RN  Primary Nurse Rpt Post: KarlieRN  Pt Education:procedural, pain   Care Plan: Continue HD   Tx Summary: 3.5hrs completed.   B/P elevated during tx, B/P meds given by primary RN. Post-Eating in bed, in no distress. Admiting Diagnosis:  Pt's previous clinic-[facility]  Consent signed - Informed Consent Verified: Yes (06/28/18 0700)  Reginald Consent - yes  Hepatitis Status-  6/24/18- Negative Hep B agtigen  Machine #- Machine Number: [id number] (06/28/18 0700)  Telemetry status-N/A   Pre-dialysis wt. - Pre-Dialysis Weight: 70.1 kg (154 lb 8.7 oz) (06/28/18 0700)

## 2018-06-28 NOTE — PROGRESS NOTES
Nurse gave patient her prescriptions for discharge home. Dilaudid, Coreg, Eliquis, Clonidine, Probiotic, Cozaar. Reviewed all with patient. Patient did not have any questions. Awaiting for family member to arrive.   Will d/c central line after family arrives and have patient lay flat for 30 minutes before discharging from hospital.

## 2018-06-28 NOTE — DISCHARGE SUMMARY
Discharge Summary       PATIENT ID: Abdi Recinos  MRN: [id number]   YOB: 1985    DATE OF ADMISSION: 6/16/2018  8:25 PM    DATE OF DISCHARGE: 6/28/2018  PRIMARY CARE PROVIDER: Lorraine Gonzalez NP     ATTENDING PHYSICIAN: Argentina Clay MD  DISCHARGING PROVIDER: Argentina Clay MD    To contact this individual call [phone] and ask the  to page. If unavailable ask to be transferred the Adult Hospitalist Department. CONSULTATIONS: IP CONSULT TO NEPHROLOGY  IP CONSULT TO THORACIC SURGERY  IP CONSULT TO INFECTIOUS DISEASES  IP CONSULT TO INFECTIOUS DISEASES  IP CONSULT TO INTERVENTIONAL RADIOLOGY  IP CONSULT TO CARDIOLOGY  IP CONSULT TO GENERAL SURGERY  IP CONSULT TO GENERAL SURGERY    PROCEDURES/SURGERIES: Procedure(s):  RIGHT VIDEO ASSITED THORASCOPY, [facility] COURSE:      Admission Summary: This is a 27-year-old woman with a past medical history significant for end-stage renal disease on hemodialysis, lupus, asthma, thromboembolism, dyslipidemia, was in her usual state of health until the day of presentation at the emergency room when the patient developed shortness of breath. The shortness of breath is progressive, it is with little or no activity. Patient had routine dialysis done. After the dialysis, that is when the patient developed the shortness of breath. Patient used to be on peritoneal dialysis, but was converted to hemodialysis because of recurrent infection. The patient was last admitted to this hospital from 05/12/2018 to 05/18/2018. She was admitted and treated for sepsis, attributed to multiple abdominal abscesses. Patient underwent drainage of the abdominal abscess. Patient also underwent drainage of a right pleural effusion. She was discharged home to complete a course of Augmentin.   She stated that she has been having shortness of breath on and off since she was discharged from hospital.  A day before coming to the emergency room here, she was seen at another emergency room. Patient was evaluated, but was not admitted. When the patient arrived at the emergency room, she was found to have elevated lactic acid level, low grade temperature, tachycardia. Code sepsis was called. The chest x-ray shows evidence of pneumonia Similar presentation last admission as well     Interval history / Subjective:   She was seen after dialysis,she is feeling well. Ready for home if she can get her dad pick her up. Assessment & Plan:    Health care associated Pneumonia with partially loculated pleural effusion (?recurrent)  -CXR 6/17 Unchanged mild right pleural effusion with patchy opacification right base  -CT chest/abd 6/17 The pleural fluid on the right extends more superiorly in the right hemithorax and there is a more focal collection laterally measuring 5.4 cm may be partially loculated. -Underwent  VATS on 6/21. She did very well. Chest tube removed. Completed antibiotics. ID and thoracis signed off and cleared for discharge. Abdominal cavity abscess (last admission)  -CT abd 6/17 Pigtail catheter overlies collection in the pelvis anteriorly which has diminished considerably in size since 5/16/2018 and there is a small amount of residual fluid noted  -general surgery consulted. Drain removed. She will follow with Dr Donnie Hui in the office. Suspected sepsis: treated     ESRD: resume out patient HD (TTS)     Hyperkalemia: resolved with dialysis. Anemia  -likely of chronic disease  -on EPO   -Hb 6. 9. Transfused one unit 6/26     Lupus  -Outpatient being followed by Dr Sincere Austin     -d/w Dr. Bree Ortega will increase prednisone to 10 for now and see how she does also can start Imuran but will take few days to kick in     - On prednisone and Plaquenil     Chronic DVT  -on Eliquis resumed after surgery     Elevated troponin. MI ruled out.  -Echo EF 60-65% with no RWMA     Dyslipidemia  -continue home meds     Diet  Renal diet Hypertension: uncontrolled:regimen adjusted. PENDING TEST RESULTS:   At the time of discharge the following test results are still pending: none    FOLLOW UP APPOINTMENTS:    Follow-up Information     Follow up With Details Comments Jose Carlos De La Vega MD On 6/18/2018 at [facility] for cardiology followup/ Blood pressure evaluation. Please arrive at 1:40PM [address]  [id number] [facility],[phone]      Anabel Sutherland MD On 12/21/2018 Echocardiogram at 1PM, followed by appointment with Dr. Jackeline Wong. Please arrive at 12:40PM [address] [phone]      6 [facility]   [address]  [address] [facility] [address], [place] In 2 weeks Call office to schedule appointment with Dr. Christine Johnson for two weeks after drain removal.  [address] [employer] [employer] [facility]  [address]  [address]  [phone]      Fabricio Hadley MD In 1 week Go to your dislysis as previously scheduled. [facility]             ADDITIONAL CARE RECOMMENDATIONS:     DIET: Renal Diet    ACTIVITY: Activity as tolerated    WOUND CARE: NA    EQUIPMENT needed: NA      DISCHARGE MEDICATIONS:  Current Discharge Medication List      START taking these medications    Details   losartan (COZAAR) 50 mg tablet Take 1 Tab by mouth daily for 30 days. Qty: 30 Tab, Refills: 0      L. acidoph & paracasei- S therm- Bifido (AJIT-Q/RISAQUAD) 8 billion cell cap cap Take 1 Cap by mouth daily for 30 days. Qty: 30 Cap, Refills: 0      cloNIDine HCl (CATAPRES) 0.1 mg tablet Take 1 Tab by mouth three (3) times daily for 30 days. Qty: 90 Tab, Refills: 0      HYDROmorphone (DILAUDID) 2 mg tablet Take 1 Tab by mouth every six (6) hours as needed for Pain for up to 14 days. Max Daily Amount: 8 mg.   Qty: 14 Tab, Refills: 0    Associated Diagnoses: Chronic bilateral low back pain without sciatica         CONTINUE these medications which have CHANGED    Details   amLODIPine (NORVASC) 5 mg tablet Take 2 Tabs by mouth daily for 30 days. Qty: 30 Tab, Refills: 0      apixaban (ELIQUIS) 2.5 mg tablet Take 1 Tab by mouth two (2) times a day for 30 days. Qty: 60 Tab, Refills: 0      carvedilol (COREG) 25 mg tablet Take 1 Tab by mouth two (2) times daily (with meals) for 30 days. Qty: 60 Tab, Refills: 0      predniSONE (DELTASONE) 5 mg tablet Take 2 Tabs by mouth daily. Qty: 30 Tab, Refills: 0         CONTINUE these medications which have NOT CHANGED    Details   oxyCODONE IR (ROXICODONE) 5 mg immediate release tablet Take 1 tab in AM and HALF to 1 tab in PM if needed on days where back pain is severe   Qty: 40 Tab, Refills: 0    Associated Diagnoses: Chronic bilateral low back pain without sciatica      senna (SENNA) 8.6 mg tablet Take 1 Tab by mouth daily as needed for Constipation. for constipation      amitriptyline (ELAVIL) 25 mg tablet Take 25 mg by mouth nightly as needed for Sleep.      polyethylene glycol (MIRALAX) 17 gram packet Take 1 Packet by mouth daily. Qty: 30 Packet, Refills: 0      fluticasone-vilanterol (BREO ELLIPTA) 200-25 mcg/dose inhaler Take 1 Puff by inhalation daily. Rinse mouth out after use  Qty: 1 Inhaler, Refills: 5    Associated Diagnoses: Acute bronchiolitis with bronchospasm      gemfibrozil (LOPID) 600 mg tablet Take 300 mg by mouth daily. azaTHIOprine (IMURAN) 50 mg tablet Take 50 mg by mouth daily (after breakfast). albuterol (PROVENTIL HFA, VENTOLIN HFA, PROAIR HFA) 90 mcg/actuation inhaler Take 1-2 Puffs by inhalation every four (4) hours as needed for Wheezing or Shortness of Breath. Qty: 1 Inhaler, Refills: 2      hydroxychloroquine (PLAQUENIL) 200 mg tablet Take 200 mg by mouth two (2) times a day. allopurinol (ZYLOPRIM) 100 mg tablet Take 100 mg by mouth daily (after breakfast). Needs to TAKE on a full stomach; will cause her to be nauseated.       cyanocobalamin (VITAMIN B-12) 2,500 mcg sublingual tablet Take 1 Tab by mouth daily. Qty: 90 Tab, Refills: 1    Associated Diagnoses: Leukopenia; Low vitamin B12 level; Hypotension due to drugs      pantoprazole (PROTONIX) 40 mg tablet Take 1 Tab by mouth Daily (before breakfast). Qty: 30 Tab, Refills: 0         STOP taking these medications       amoxicillin 500 mg tab Comments:   Reason for Stopping:                 NOTIFY YOUR PHYSICIAN FOR ANY OF THE FOLLOWING:   Fever over 101 degrees for 24 hours. Chest pain, shortness of breath, fever, chills, nausea, vomiting, diarrhea, change in mentation, falling, weakness, bleeding. Severe pain or pain not relieved by medications. Or, any other signs or symptoms that you may have questions about. DISPOSITION:  x  Home With:   OT  PT  HH  RN       Long term SNF/Inpatient Rehab    Independent/assisted living    Hospice    Other:       PATIENT CONDITION AT DISCHARGE:     Functional status    Poor     Deconditioned    x Independent      Cognition   x  Lucid     Forgetful     Dementia      Catheters/lines (plus indication)    Franco     PICC     PEG    x None      Code status   x  Full code     DNR      PHYSICAL EXAMINATION AT DISCHARGE:     Visit Vitals    /85 (BP 1 Location: Left arm, BP Patient Position: At rest)    Pulse 93    Temp 98.3 °F (36.8 °C)    Resp 20    Ht 5' 5\" (1.651 m)    Wt 70.1 kg (154 lb 8.7 oz)    SpO2 99%    BMI 25.72 kg/m2    O2 Flow Rate (L/min): 2 l/min O2 Device: Room air    Temp (24hrs), Av.2 °F (36.8 °C), Min:97.3 °F (36.3 °C), Max:99.8 °F (37.7 °C)    701 - 1900  In: 6755 [P.O.:360]  Out: 2500    1901 -  0700  In: 240 [P.O.:240]  Out: 50 [Drains:50]        Constitutional:  No acute distress, cooperative, pleasant    ENT:  Oral mucous moist, oropharynx benign.  Neck supple,    Resp:  Rt sided crackles up to mid lung CT tube in place left lung CTA   CV:  Regular rhythm, normal rate, no murmurs, gallops, rubs    GI:  Soft, non distended, non tender. normoactive bowel sounds, no hepatosplenomegaly, RLQ drain in place    Musculoskeletal:  Trace edema, warm, 2+ pulses throughout,Right AV fistula in place    Neurologic:  Moves all extremities.   AAOx3, CN II-XII reviewed                                             Skin:  Good turgor, no rashes or ulcers        CHRONIC MEDICAL DIAGNOSES:  Problem List as of 6/28/2018  Date Reviewed: 6/21/2018          Codes Class Noted - Resolved    Troponin level elevated ICD-10-CM: R74.8  ICD-9-CM: 790.6  6/17/2018 - Present        * (Principal)Sepsis ([facility] ICD-10-CM: A41.9  ICD-9-CM: 038.9, 995.91  4/29/2018 - Present        Generalized abdominal pain ICD-10-CM: R10.84  ICD-9-CM: 789.07  4/4/2018 - Present        Other constipation ICD-10-CM: K59.09  ICD-9-CM: 564.09  4/4/2018 - Present        Other ascites ICD-10-CM: R18.8  ICD-9-CM: 789.59  4/4/2018 - Present        Peritonitis ([facility] ICD-10-CM: K65.9  ICD-9-CM: 567.9  3/11/2018 - Present        History of pulmonary embolism ICD-10-CM: R19.973  ICD-9-CM: V12.55  12/8/2017 - Present        Hypomagnesemia ICD-10-CM: E83.42  ICD-9-CM: 275.2  10/30/2017 - Present        Hypocalcemia ICD-10-CM: E83.51  ICD-9-CM: 275.41  10/30/2017 - Present        Hypokalemia ICD-10-CM: E87.6  ICD-9-CM: 276.8  10/27/2017 - Present        Hypertension (Chronic) ICD-10-CM: I10  ICD-9-CM: 401.9  10/14/2017 - Present        Chronic bilateral low back pain without sciatica ICD-10-CM: M54.5, G89.29  ICD-9-CM: 724.2, 338.29  5/26/2017 - Present        Anemia of renal disease ICD-10-CM: D63.1  ICD-9-CM: 285.21  2/21/2017 - Present        Dependence on peritoneal dialysis [facility]) ICD-10-CM: Z99.2  ICD-9-CM: V45.11  2/21/2017 - Present        ACP (advance care planning) ICD-10-CM: Z71.89  ICD-9-CM: V65.49  2/21/2017 - Present        ESRD on peritoneal dialysis ([facility] (Chronic) ICD-10-CM: N18.6, Z99.2  ICD-9-CM: 585.6, V45.11  10/7/2016 - Present        Malignant hypertension ICD-10-CM: I10  ICD-9-CM: 401.0 7/11/2016 - Present        Encounter for monitoring opioid maintenance therapy ICD-10-CM: Z51.81, Z79.891  ICD-9-CM: V58.83, V58.69  11/3/2015 - Present        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  ICD-9-CM: 710.0, 583.81  3/10/2012 - Present        Lupus ICD-10-CM: L93.0  ICD-9-CM: 695.4  2/27/2012 - Present        RESOLVED: Abscess of abdominal cavity ([facility] ICD-10-CM: K65.1  ICD-9-CM: 567.22  5/12/2018 - 5/18/2018        RESOLVED: Sepsis ([facility] ICD-10-CM: A41.9  ICD-9-CM: 038.9, 995.91  12/12/2017 - 12/22/2017        RESOLVED: Pneumonia ICD-10-CM: J18.9  ICD-9-CM: 347  10/14/2017 - 12/8/2017        RESOLVED: Pleural effusion, left ICD-10-CM: J90  ICD-9-CM: 511.9  10/14/2017 - 12/8/2017        RESOLVED: Idiopathic chronic inflammatory bowel disease ICD-10-CM: Z65.67  ICD-9-CM: 558.9  8/28/2013 - 8/28/2013        RESOLVED: Abdominal pain ICD-10-CM: R10.9  ICD-9-CM: 789.00  8/28/2013 - 9/3/2013        RESOLVED: Hypoxia ICD-10-CM: R09.02  ICD-9-CM: 799.02  4/25/2013 - 4/30/2013        RESOLVED: 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  ICD-9-CM: 710.0, 583.81  4/27/2012 - 4/28/2012              Greater than 40 minutes were spent with the patient on counseling and coordination of care    Signed:    [address] MD  6/28/2018  3:28 PM

## 2018-06-28 NOTE — PROGRESS NOTES
Daily Progress Note  [facility] at [address] Date: 2018    Subjective:     Last 24 hrs: Accordion drain was removed without incident. She feels well, no abdominal pain. Currently getting dialysis. Objective:     Blood pressure (!) 153/111, pulse 88, temperature 97.3 °F (36.3 °C), temperature source Oral, resp. rate 20, height 5' 5\" (1.651 m), weight 70.1 kg (154 lb 8.7 oz), SpO2 95 %. Temp (24hrs), Av.3 °F (36.8 °C), Min:97.3 °F (36.3 °C), Max:99.8 °F (37.7 °C)      _____________________  Physical Exam:     Alert and Oriented, lying in bed, no acute distress. Cardiovascular: RRR, no peripheral edema  Lungs:CTAB   Abdomen: Soft, NT. Assessment:   Principal Problem:    Sepsis ([address] (2018)    Active Problems:    Troponin level elevated (2018)    s/p IR drainage of recurrent abdominal abscess, which originated from infected peritoneal dialysis catheter. Plan:     F/U with Dr. Mary Grace Manning in two weeks  Will follow peripherally while she is in hospital.         Rolan Gates, [address] [facility] at [address]  [phone]    Data Review:    Recent Labs      18   0559  18   0355  18   0344   WBC  3.9  5.1  7.1   HGB  6.9*  7.2*  6.9*   HCT  23.0*  23.3*  23.0*   PLT  214  201  214     Recent Labs      18   0559  18   0355  18   0344   NA  138  141  139   K  4.5  4.2  5.2*   CL  102  103  102   CO2     GLU  99  92  87   BUN  47*  35*  45*   CREA  5.95*  4.74*  6.46*   CA  8.4*  8.4*  9.2   MG  2.1  2.0  2.2   PHOS  4.7  4.2  5.1*   ALB  1.8*  1.8*  1.8*   TBILI  0.3  0.5  0.4   SGOT  13*  12*  16   ALT  8*  8*  9*     No results for input(s): AML, LPSE in the last 72 hours.         ______________________  Medications:    Current Facility-Administered Medications   Medication Dose Route Frequency    0.9% sodium chloride infusion 250 mL  250 mL IntraVENous PRN    0.9% sodium chloride infusion 250 mL  250 mL IntraVENous PRN    hydrALAZINE (APRESOLINE) 20 mg/mL injection 20 mg  20 mg IntraVENous Q6H PRN    cloNIDine HCl (CATAPRES) tablet 0.1 mg  0.1 mg Oral TID    predniSONE (DELTASONE) tablet 10 mg  10 mg Oral DAILY    azaTHIOprine (IMURAN) tablet 50 mg  50 mg Oral DAILY AFTER BREAKFAST    epoetin pia (EPOGEN;PROCRIT) injection 10,000 Units  10,000 Units SubCUTAneous DIALYSIS TUE, THU & SAT    HYDROmorphone (DILAUDID) syringe 0.5 mg  0.5 mg IntraVENous Q4H PRN    oxyCODONE IR (ROXICODONE) tablet 10 mg  10 mg Oral Q4H PRN    haloperidol lactate (HALDOL) injection 2 mg  2 mg IntraVENous Q4H PRN    apixaban (ELIQUIS) tablet 2.5 mg  2.5 mg Oral BID    alteplase (CATHFLO) 1 mg in sterile water (preservative free) 1 mL injection  1 mg InterCATHeter PRN    sodium chloride (NS) flush 10-30 mL  10-30 mL InterCATHeter PRN    sodium chloride (NS) flush 10 mL  10 mL InterCATHeter Q24H    sodium chloride (NS) flush 10 mL  10 mL InterCATHeter PRN    sodium chloride (NS) flush 10-40 mL  10-40 mL InterCATHeter Q8H    sodium chloride (NS) flush 20 mL  20 mL InterCATHeter Q24H    bacitracin 500 unit/gram packet 1 Packet  1 Packet Topical PRN    lactobac ac& pc-s.therm-b.anim (AJIT Q/RISAQUAD)  1 Cap Oral DAILY    senna (SENOKOT) tablet 8.6 mg  1 Tab Oral QHS    sodium chloride (NS) flush 5-10 mL  5-10 mL IntraVENous Q8H    sodium chloride (NS) flush 5-10 mL  5-10 mL IntraVENous PRN    naloxone (NARCAN) injection 0.4 mg  0.4 mg IntraVENous PRN    docusate sodium (COLACE) capsule 100 mg  100 mg Oral BID    albuterol-ipratropium (DUO-NEB) 2.5 MG-0.5 MG/3 ML  3 mL Nebulization Q4H PRN    allopurinol (ZYLOPRIM) tablet 100 mg  100 mg Oral DAILY AFTER BREAKFAST    amitriptyline (ELAVIL) tablet 25 mg  25 mg Oral QHS PRN    amLODIPine (NORVASC) tablet 10 mg  10 mg Oral DAILY    cyanocobalamin (VITAMIN B12) tablet 2,500 mcg  2,500 mcg Oral DAILY    gemfibrozil (LOPID) tablet 300 mg  300 mg Oral DAILY    hydroxychloroquine (PLAQUENIL) tablet 200 mg  200 mg Oral BID    pantoprazole (PROTONIX) tablet 40 mg  40 mg Oral ACB    senna (SENOKOT) tablet 8.6 mg  1 Tab Oral DAILY PRN    ondansetron (ZOFRAN) injection 4 mg  4 mg IntraVENous Q4H PRN    arformoterol (BROVANA) neb solution 15 mcg  15 mcg Nebulization BID RT    And    budesonide (PULMICORT) 500 mcg/2 ml nebulizer suspension  500 mcg Nebulization BID RT    carvedilol (COREG) tablet 25 mg  25 mg Oral BID WITH MEALS    losartan (COZAAR) tablet 50 mg  50 mg Oral DAILY

## 2018-06-29 ENCOUNTER — HOME HEALTH ADMISSION (OUTPATIENT)
Dept: HOME HEALTH SERVICES | Facility: HOME HEALTH | Age: 33
End: 2018-06-29

## 2018-06-29 ENCOUNTER — PATIENT OUTREACH (OUTPATIENT)
Dept: FAMILY MEDICINE CLINIC | Age: 33
End: 2018-06-29

## 2018-06-29 LAB
ABO + RH BLD: NORMAL
ACID FAST STN SPEC: NEGATIVE
BLD PROD TYP BPU: NORMAL
BLOOD GROUP ANTIBODIES SERPL: NORMAL
BPU ID: NORMAL
CROSSMATCH RESULT,%XM: NORMAL
MYCOBACTERIUM SPEC QL CULT: NEGATIVE
SPECIMEN EXP DATE BLD: NORMAL
SPECIMEN PREPARATION: NORMAL
SPECIMEN SOURCE: NORMAL
STATUS OF UNIT,%ST: NORMAL
UNIT DIVISION, %UDIV: 0

## 2018-06-29 NOTE — PROGRESS NOTES
Sepsis Note     Most recent vital signs: 98.2, 147/94, p-93, r-18. Source of Infection:  pneumonia    Antibiotic prescribed at discharge: none, completed during admission(Zosyn, Levaquin and Vancomycin)  Length of remaining treatment: na  Are you taking your antibiotic as prescribed? na     Infectious Disease Consult during admission: yes    In the last 24 hour have you experienced; Fever -no   Low body temperature no    Chills or shaking no    Sweating no    Fast heart rate no    Fast breathing no    Dizziness/lightheadedness no    Confusion or unusual change in mental status no    Diarrhea no    Nausea no    Vomiting no    Shortness of breath or difficulty breathing no    Less urine output no    Cold, clammy, and pale skin no     Skin rash or skin color changes no     If the patients has two or more symptoms present notify the primary care provider of the concern for sepsis. Hospital Discharge Follow-Up      Date/Time:  2018 10:47 AM    Patient was admitted to [facility] on  and discharged on  for sepsis/pneumonia, ESRD,anemia,hyperkalemia,lupus. . The physician discharge summary was available at the time of outreach. Patient was contacted within 1 business days of discharge. Top Challenges reviewed with the provider   High risk patient-readmission. [facility] -. ESRD, Sepsis/pneumonia,anemia, hyperkalemia. Hgb 6.9-7.2 . Palliative consult done for pain management-chronic opioids. Polypharmacy         Method of communication with provider :chart routing,face to face    Inpatient RRAT score: not given  Was this a readmission? yes   Patient stated reason for the readmission: sob    Nurse Navigator (NN) contacted the patient by telephone to perform post hospital discharge assessment. Verified name and  with patient as identifiers. Provided introduction to self, and explanation of the Nurse Navigator role.      Reviewed discharge instructions and red flags with patient who verbalized understanding. Patient given an opportunity to ask questions and does not have any further questions or concerns at this time. The patient agrees to contact the PCP office for questions related to their healthcare. NN provided contact information for future reference. Disease Specific:   Sepsis    Summary of patient's top problems:  1. Sepsis/pneumonia. [facility] 6/16-6/28.   2. Anemia, one transfusion while in hospital. Hgb 6.9-7.2 on day of discharge  3. ESRD- dialysis twice a week. Home Health orders at discharge: yes, SN,PT/OT  [address]: [facility]  Date of initial visit: TBD    Durable Medical Equipment ordered/company: na  Durable Medical Equipment received: na    Barriers to care? Multiple comorbidities- ESRD on dialysis, lupus,asthma,thromboembolism,dyslipidemia. [facility] sepsis 6/16-6/28; frequent FTS for SURJIT visits. Advance Care Planning:   Does patient have an Advance Directive:  no    Medication(s):     New Medications at Discharge: Losartan 50mg qd, Leigh Q Qd, Clonidine 0.1mg tid, Dilaudid 2mg q 6 hours prn pain. Changed Medications at Discharge: Norvasc 5mg-take 2 tabs qd, Eliquis 2.5mg bid, Coreg 25mg bid, Prednisone 5mg 2 tabs qd  Discontinued Medications at Discharge: Amoxicillin    Medication reconciliation was performed with patient, who verbalizes understanding of administration of home medications. There were no barriers to obtaining medications identified at this time. Referral to Pharm D needed: no     Current Outpatient Prescriptions   Medication Sig    amLODIPine (NORVASC) 5 mg tablet Take 2 Tabs by mouth daily for 30 days.  apixaban (ELIQUIS) 2.5 mg tablet Take 1 Tab by mouth two (2) times a day for 30 days.  carvedilol (COREG) 25 mg tablet Take 1 Tab by mouth two (2) times daily (with meals) for 30 days.  losartan (COZAAR) 50 mg tablet Take 1 Tab by mouth daily for 30 days.     L. acidoph & paracasei- S therm- Bifido (AJIT-Q/RISAQUAD) 8 billion cell cap cap Take 1 Cap by mouth daily for 30 days.  cloNIDine HCl (CATAPRES) 0.1 mg tablet Take 1 Tab by mouth three (3) times daily for 30 days.  HYDROmorphone (DILAUDID) 2 mg tablet Take 1 Tab by mouth every six (6) hours as needed for Pain for up to 14 days. Max Daily Amount: 8 mg.  oxyCODONE IR (ROXICODONE) 5 mg immediate release tablet Take 1 tab in AM and HALF to 1 tab in PM if needed on days where back pain is severe     senna (SENNA) 8.6 mg tablet Take 1 Tab by mouth daily as needed for Constipation. for constipation    amitriptyline (ELAVIL) 25 mg tablet Take 25 mg by mouth nightly as needed for Sleep.  polyethylene glycol (MIRALAX) 17 gram packet Take 1 Packet by mouth daily.  fluticasone-vilanterol (BREO ELLIPTA) 200-25 mcg/dose inhaler Take 1 Puff by inhalation daily. Rinse mouth out after use    gemfibrozil (LOPID) 600 mg tablet Take 300 mg by mouth daily.  azaTHIOprine (IMURAN) 50 mg tablet Take 50 mg by mouth daily (after breakfast).  albuterol (PROVENTIL HFA, VENTOLIN HFA, PROAIR HFA) 90 mcg/actuation inhaler Take 1-2 Puffs by inhalation every four (4) hours as needed for Wheezing or Shortness of Breath.  hydroxychloroquine (PLAQUENIL) 200 mg tablet Take 200 mg by mouth two (2) times a day.  allopurinol (ZYLOPRIM) 100 mg tablet Take 100 mg by mouth daily (after breakfast). Needs to TAKE on a full stomach; will cause her to be nauseated.  cyanocobalamin (VITAMIN B-12) 2,500 mcg sublingual tablet Take 1 Tab by mouth daily.  pantoprazole (PROTONIX) 40 mg tablet Take 1 Tab by mouth Daily (before breakfast).  predniSONE (DELTASONE) 5 mg tablet Take 2 Tabs by mouth daily. No current facility-administered medications for this visit. There are no discontinued medications.     PCP/Specialist follow up:   Future Appointments  Date Time Provider [facility]   7/2/2018 11:00 AM Fede Lofton MD [address]   7/2/2018 1:00 PM Chris Elise MD [address]   7/18/2018 2:00 PM Devon Da Silva  [address]   12/21/2018 1:00  [facility], [address]   12/21/2018 2:00 PM Devon Da Silva  [address]          Goals        Patient [address] (pt-stated)            10/19/17- NN did not discuss this with patient. Will plan to discuss with patient during upcoming call. Sc  4/23/18- NN encouraged patient to complete when she comes for her next follow-up visit. Patient verbalized understanding and will schedule. pk  5/21/18- Patient remains full code. Stating Mother is her legal next of kin, will make decisions for her. pk       Patient/Family verbalizes understanding of self-management of  disease. (pt-stated)            11/29/17- Patient verbalized understanding of hypokalemia and treatment plan was review with patient PK    How can you care for yourself at home? · If your doctor recommends it, eat foods that have a lot of potassium. These include fresh fruits, juices, and vegetables. They also include nuts, beans, and milk. · Be safe with medicines. If your doctor prescribes medicines or potassium supplements, take them exactly as directed. Call your doctor if you have any problems with your medicines. · Get your potassium levels tested as often as your doctor tells you.    4/25/18- Patient verbalized understanding of plan of care, attending dialysis and appointments as scheduled. Was seen by [facility] on 4/23/18. pk    4/30/18- Patient educated on diet restrictions at discharge, NN will continue attempts to reach for follow-up assessment and educate on diet restrictions. Patient attending dialysis at this time on 5/1/18. pk    5/21/18- discharge instructions and medication reconciliation reviewed. Patient verbalized understanding of plan of care. pk  6/29/18 Reviewed discharge instructions and medications with patient.  Reminded to call cardiologist, [facility], for f/u appt per hospital rec. Also to schedule f/u with surgeon, , for 2 weeks, and Dr.Deep Feliz(neph) for one week. NN sched SURJIT with pcp, will see  on 7/2 at 11am.mbt       Prevention of infection (pt-stated)            10/19/17- patient will assess/closely monitor her temperatures  and report fevers greater than 101. Patient will assess hydration status by consuming at least six cups of water to avoid dehydration. Patient will take Levaquin as prescribed. sc  10/31/17- NN instructed patient on importance of taking all medications as ordered, follow-ups as scheduled- PK    Call your doctor now or seek immediate medical care if:  ? · You cough up dark brown or bloody mucus (sputum). ? · You have new or worse trouble breathing. ? · You are dizzy or lightheaded, or you feel like you may faint. ? Watch closely for changes in your health, and be sure to contact your doctor if:  ? · You have a new or higher fever. ? · You are coughing more deeply or more often. ? · You are not getting better after 2 days (48 hours). 6/29/18 ? · You do not get better as expected. [facility] 6/16-6/28 sepsis/pneumonia. Reviewed signs/symptoms above to watch for and notify NN/provider. Scheduled SURJIT for 7/2 11am with . (reminded she has appt at 1pm with neuro). mbt             4/23/18- Discharge instructions, appointments and medications reviewed. Patient verbalized understanding. pk    5/1/18- discharged on Levaquin 500mg every 48 hrs,take after Dialysis   5/21/18- Patient was discharged on Augmentin for 7 days, started on 5/18-5/25/18. Home with drainage bag, instructed on flushing and care. pk           Goals        Patient [address] (pt-stated)            10/19/17- NN did not discuss this with patient. Will plan to discuss with patient during upcoming call. Sc  4/23/18- NN encouraged patient to complete when she comes for her next follow-up visit.  Patient verbalized understanding and will schedule. pk  5/21/18- Patient remains full code. Stating Mother is her legal next of kin, will make decisions for her. pk       Patient/Family verbalizes understanding of self-management of  disease. (pt-stated)            11/29/17- Patient verbalized understanding of hypokalemia and treatment plan was review with patient PK    How can you care for yourself at home? · If your doctor recommends it, eat foods that have a lot of potassium. These include fresh fruits, juices, and vegetables. They also include nuts, beans, and milk. · Be safe with medicines. If your doctor prescribes medicines or potassium supplements, take them exactly as directed. Call your doctor if you have any problems with your medicines. · Get your potassium levels tested as often as your doctor tells you.    4/25/18- Patient verbalized understanding of plan of care, attending dialysis and appointments as scheduled. Was seen by [facility] on 4/23/18. pk    4/30/18- Patient educated on diet restrictions at discharge, NN will continue attempts to reach for follow-up assessment and educate on diet restrictions. Patient attending dialysis at this time on 5/1/18. pk    5/21/18- discharge instructions and medication reconciliation reviewed. Patient verbalized understanding of plan of care. pk  6/29/18 Reviewed discharge instructions and medications with patient. Reminded to call Ramin Pitt, for f/u appt per hospital rec. Also to schedule f/u with surgeon, , for 2 weeks, and Dr.Deep Feliz(neph) for one week. NN sched SURJIT with pcp, will see  on 7/2 at 11am.mbt       Prevention of infection (pt-stated)            10/19/17- patient will assess/closely monitor her temperatures  and report fevers greater than 101. Patient will assess hydration status by consuming at least six cups of water to avoid dehydration. Patient will take Levaquin as prescribed. sc  10/31/17- NN instructed patient on importance of taking all medications as ordered, follow-ups as scheduled- PK    Call your doctor now or seek immediate medical care if:  ? · You cough up dark brown or bloody mucus (sputum). ? · You have new or worse trouble breathing. ? · You are dizzy or lightheaded, or you feel like you may faint. ? Watch closely for changes in your health, and be sure to contact your doctor if:  ? · You have a new or higher fever. ? · You are coughing more deeply or more often. ? · You are not getting better after 2 days (48 hours). 6/29/18 ? · You do not get better as expected. 4/23/18- Discharge instructions, appointments and medications reviewed. Patient verbalized understanding. pk    5/1/18- discharged on Levaquin 500mg every 48 hrs,take after Dialysis   5/21/18- Patient was discharged on Augmentin for 7 days, started on 5/18-5/25/18. Home with drainage bag, instructed on flushing and care. pk  6/29/18 [facility] 6/16-6/28sepsis/pneumonia. Reviewed signs/symptoms of infection as above to watch for and notify NN/provider if any should develop. Scheduled SURJIT with pcp for 7/2 at 11am with  and reminded patient of importance of this visit. Also reminded of her neuro appt at 1pm that was previously scheduled. gregor

## 2018-07-01 ENCOUNTER — HOSPITAL ENCOUNTER (EMERGENCY)
Age: 33
Discharge: HOME | End: 2018-07-02
Attending: STUDENT IN AN ORGANIZED HEALTH CARE EDUCATION/TRAINING PROGRAM
Payer: MEDICARE

## 2018-07-01 ENCOUNTER — APPOINTMENT (OUTPATIENT)
Dept: CT IMAGING | Age: 33
End: 2018-07-01
Attending: STUDENT IN AN ORGANIZED HEALTH CARE EDUCATION/TRAINING PROGRAM
Payer: MEDICARE

## 2018-07-01 DIAGNOSIS — R07.89 CHEST WALL PAIN: Primary | ICD-10-CM

## 2018-07-01 LAB
ALBUMIN SERPL-MCNC: 2.3 G/DL (ref 3.5–5)
ALBUMIN/GLOB SERPL: 0.5 {RATIO} (ref 1.1–2.2)
ALP SERPL-CCNC: 72 U/L (ref 45–117)
ALT SERPL-CCNC: 11 U/L (ref 12–78)
ANION GAP SERPL CALC-SCNC: 7 MMOL/L (ref 5–15)
AST SERPL-CCNC: 22 U/L (ref 15–37)
BASOPHILS # BLD: 0 K/UL (ref 0–0.1)
BASOPHILS NFR BLD: 1 % (ref 0–1)
BILIRUB SERPL-MCNC: 0.4 MG/DL (ref 0.2–1)
BUN SERPL-MCNC: 36 MG/DL (ref 6–20)
BUN/CREAT SERPL: 6 (ref 12–20)
CALCIUM SERPL-MCNC: 8.5 MG/DL (ref 8.5–10.1)
CHLORIDE SERPL-SCNC: 101 MMOL/L (ref 97–108)
CO2 SERPL-SCNC: 31 MMOL/L (ref 21–32)
CREAT SERPL-MCNC: 5.66 MG/DL (ref 0.55–1.02)
DIFFERENTIAL METHOD BLD: ABNORMAL
EOSINOPHIL # BLD: 0.1 K/UL (ref 0–0.4)
EOSINOPHIL NFR BLD: 2 % (ref 0–7)
ERYTHROCYTE [DISTWIDTH] IN BLOOD BY AUTOMATED COUNT: 18.6 % (ref 11.5–14.5)
GLOBULIN SER CALC-MCNC: 5 G/DL (ref 2–4)
GLUCOSE SERPL-MCNC: 94 MG/DL (ref 65–100)
HCT VFR BLD AUTO: 28.6 % (ref 35–47)
HGB BLD-MCNC: 8.6 G/DL (ref 11.5–16)
IMM GRANULOCYTES # BLD: 0 K/UL (ref 0–0.04)
IMM GRANULOCYTES NFR BLD AUTO: 0 % (ref 0–0.5)
LACTATE SERPL-SCNC: 0.9 MMOL/L (ref 0.4–2)
LYMPHOCYTES # BLD: 0.8 K/UL (ref 0.8–3.5)
LYMPHOCYTES NFR BLD: 18 % (ref 12–49)
MCH RBC QN AUTO: 28.2 PG (ref 26–34)
MCHC RBC AUTO-ENTMCNC: 30.1 G/DL (ref 30–36.5)
MCV RBC AUTO: 93.8 FL (ref 80–99)
MONOCYTES # BLD: 0.2 K/UL (ref 0–1)
MONOCYTES NFR BLD: 4 % (ref 5–13)
NEUTS SEG # BLD: 3.5 K/UL (ref 1.8–8)
NEUTS SEG NFR BLD: 75 % (ref 32–75)
NRBC # BLD: 0.02 K/UL (ref 0–0.01)
NRBC BLD-RTO: 0.4 PER 100 WBC
PLATELET # BLD AUTO: 279 K/UL (ref 150–400)
PMV BLD AUTO: 9.8 FL (ref 8.9–12.9)
POTASSIUM SERPL-SCNC: 4.5 MMOL/L (ref 3.5–5.1)
PROT SERPL-MCNC: 7.3 G/DL (ref 6.4–8.2)
RBC # BLD AUTO: 3.05 M/UL (ref 3.8–5.2)
SODIUM SERPL-SCNC: 139 MMOL/L (ref 136–145)
WBC # BLD AUTO: 4.7 K/UL (ref 3.6–11)

## 2018-07-01 PROCEDURE — 85025 COMPLETE CBC W/AUTO DIFF WBC: CPT | Performed by: STUDENT IN AN ORGANIZED HEALTH CARE EDUCATION/TRAINING PROGRAM

## 2018-07-01 PROCEDURE — 74011000258 HC RX REV CODE- 258: Performed by: STUDENT IN AN ORGANIZED HEALTH CARE EDUCATION/TRAINING PROGRAM

## 2018-07-01 PROCEDURE — 71275 CT ANGIOGRAPHY CHEST: CPT

## 2018-07-01 PROCEDURE — 93005 ELECTROCARDIOGRAM TRACING: CPT

## 2018-07-01 PROCEDURE — 74011636320 HC RX REV CODE- 636/320: Performed by: STUDENT IN AN ORGANIZED HEALTH CARE EDUCATION/TRAINING PROGRAM

## 2018-07-01 PROCEDURE — 96374 THER/PROPH/DIAG INJ IV PUSH: CPT

## 2018-07-01 PROCEDURE — 99285 EMERGENCY DEPT VISIT HI MDM: CPT

## 2018-07-01 PROCEDURE — 83605 ASSAY OF LACTIC ACID: CPT | Performed by: STUDENT IN AN ORGANIZED HEALTH CARE EDUCATION/TRAINING PROGRAM

## 2018-07-01 PROCEDURE — 74011250636 HC RX REV CODE- 250/636: Performed by: STUDENT IN AN ORGANIZED HEALTH CARE EDUCATION/TRAINING PROGRAM

## 2018-07-01 PROCEDURE — 36415 COLL VENOUS BLD VENIPUNCTURE: CPT | Performed by: STUDENT IN AN ORGANIZED HEALTH CARE EDUCATION/TRAINING PROGRAM

## 2018-07-01 PROCEDURE — 80053 COMPREHEN METABOLIC PANEL: CPT | Performed by: STUDENT IN AN ORGANIZED HEALTH CARE EDUCATION/TRAINING PROGRAM

## 2018-07-01 RX ORDER — SODIUM CHLORIDE 0.9 % (FLUSH) 0.9 %
10 SYRINGE (ML) INJECTION
Status: COMPLETED | OUTPATIENT
Start: 2018-07-01 | End: 2018-07-01

## 2018-07-01 RX ORDER — HYDROMORPHONE HYDROCHLORIDE 1 MG/ML
1 INJECTION, SOLUTION INTRAMUSCULAR; INTRAVENOUS; SUBCUTANEOUS ONCE
Status: COMPLETED | OUTPATIENT
Start: 2018-07-01 | End: 2018-07-01

## 2018-07-01 RX ADMIN — IOPAMIDOL 75 ML: 755 INJECTION, SOLUTION INTRAVENOUS at 23:13

## 2018-07-01 RX ADMIN — HYDROMORPHONE HYDROCHLORIDE 1 MG: 1 INJECTION, SOLUTION INTRAMUSCULAR; INTRAVENOUS; SUBCUTANEOUS at 23:57

## 2018-07-01 RX ADMIN — Medication 10 ML: at 23:13

## 2018-07-01 RX ADMIN — SODIUM CHLORIDE 100 ML: 900 INJECTION, SOLUTION INTRAVENOUS at 23:13

## 2018-07-01 NOTE — ED TRIAGE NOTES
Pt reports she was discharged this past Thursday from [facility] after being treated for pneumonia and having chest tube placed. Pt reports she is now having aching pain with swelling to insertion site of chest tube. Pt denies drainage from site.

## 2018-07-02 ENCOUNTER — OFFICE VISIT (OUTPATIENT)
Dept: FAMILY MEDICINE CLINIC | Age: 33
End: 2018-07-02

## 2018-07-02 ENCOUNTER — PATIENT OUTREACH (OUTPATIENT)
Dept: FAMILY MEDICINE CLINIC | Age: 33
End: 2018-07-02

## 2018-07-02 ENCOUNTER — OFFICE VISIT (OUTPATIENT)
Dept: NEUROLOGY | Age: 33
End: 2018-07-02

## 2018-07-02 VITALS
HEART RATE: 100 BPM | TEMPERATURE: 98.9 F | OXYGEN SATURATION: 97 % | HEIGHT: 65 IN | WEIGHT: 137 LBS | DIASTOLIC BLOOD PRESSURE: 123 MMHG | SYSTOLIC BLOOD PRESSURE: 176 MMHG | BODY MASS INDEX: 22.82 KG/M2 | RESPIRATION RATE: 18 BRPM

## 2018-07-02 VITALS
SYSTOLIC BLOOD PRESSURE: 150 MMHG | BODY MASS INDEX: 25.66 KG/M2 | TEMPERATURE: 99.3 F | OXYGEN SATURATION: 97 % | WEIGHT: 154 LBS | DIASTOLIC BLOOD PRESSURE: 100 MMHG | RESPIRATION RATE: 20 BRPM | HEART RATE: 75 BPM | HEIGHT: 65 IN

## 2018-07-02 VITALS
DIASTOLIC BLOOD PRESSURE: 94 MMHG | HEIGHT: 65 IN | OXYGEN SATURATION: 93 % | HEART RATE: 78 BPM | SYSTOLIC BLOOD PRESSURE: 138 MMHG | WEIGHT: 154 LBS | BODY MASS INDEX: 25.66 KG/M2

## 2018-07-02 DIAGNOSIS — G89.29 CHRONIC BILATERAL LOW BACK PAIN WITHOUT SCIATICA: Primary | ICD-10-CM

## 2018-07-02 DIAGNOSIS — Z09 HOSPITAL DISCHARGE FOLLOW-UP: Primary | ICD-10-CM

## 2018-07-02 DIAGNOSIS — M54.50 CHRONIC BILATERAL LOW BACK PAIN WITHOUT SCIATICA: Primary | ICD-10-CM

## 2018-07-02 DIAGNOSIS — Z87.09 HISTORY OF PLEURAL EFFUSION: ICD-10-CM

## 2018-07-02 DIAGNOSIS — R07.81 RIB PAIN ON RIGHT SIDE: ICD-10-CM

## 2018-07-02 DIAGNOSIS — D63.1 ANEMIA IN CHRONIC KIDNEY DISEASE, UNSPECIFIED CKD STAGE: ICD-10-CM

## 2018-07-02 DIAGNOSIS — F11.90 CHRONIC, CONTINUOUS USE OF OPIOIDS: ICD-10-CM

## 2018-07-02 DIAGNOSIS — N18.9 ANEMIA IN CHRONIC KIDNEY DISEASE, UNSPECIFIED CKD STAGE: ICD-10-CM

## 2018-07-02 DIAGNOSIS — N18.6 ESRD (END STAGE RENAL DISEASE) (HCC): ICD-10-CM

## 2018-07-02 LAB
ATRIAL RATE: 96 BPM
CALCULATED P AXIS, ECG09: 38 DEGREES
CALCULATED R AXIS, ECG10: 25 DEGREES
CALCULATED T AXIS, ECG11: -14 DEGREES
DIAGNOSIS, 93000: NORMAL
P-R INTERVAL, ECG05: 134 MS
Q-T INTERVAL, ECG07: 360 MS
QRS DURATION, ECG06: 80 MS
QTC CALCULATION (BEZET), ECG08: 454 MS
VENTRICULAR RATE, ECG03: 96 BPM

## 2018-07-02 RX ORDER — LEVOFLOXACIN 500 MG/1
TABLET, FILM COATED ORAL
Refills: 0 | COMMUNITY
Start: 2018-04-30 | End: 2018-07-19

## 2018-07-02 RX ORDER — APIXABAN 5 MG/1
TABLET, FILM COATED ORAL
COMMUNITY
Start: 2018-05-29 | End: 2018-07-19

## 2018-07-02 RX ORDER — OXYCODONE AND ACETAMINOPHEN 5; 325 MG/1; MG/1
1 TABLET ORAL
Qty: 60 TAB | Refills: 0 | Status: SHIPPED | OUTPATIENT
Start: 2018-07-02 | End: 2018-07-30 | Stop reason: SDUPTHER

## 2018-07-02 RX ORDER — HYDROMORPHONE HYDROCHLORIDE 4 MG/1
TABLET ORAL
Refills: 0 | COMMUNITY
Start: 2018-04-30 | End: 2018-07-19

## 2018-07-02 RX ORDER — HYDROMORPHONE HYDROCHLORIDE 2 MG/1
2 TABLET ORAL
Qty: 14 TAB | Refills: 0 | Status: SHIPPED | OUTPATIENT
Start: 2018-07-02 | End: 2018-07-02 | Stop reason: SDUPTHER

## 2018-07-02 RX ORDER — CARVEDILOL 6.25 MG/1
6.25 TABLET ORAL 2 TIMES DAILY WITH MEALS
Status: ON HOLD | COMMUNITY
Start: 2018-05-29 | End: 2018-07-22

## 2018-07-02 NOTE — PATIENT INSTRUCTIONS
Please call your general surgeon to schedule an appointment as soon as possible. [facility]  [address] [facility], [address] [place] [address], [address] [place] [address]    [phone]     Please ask your nephrologist to check your blood counts during tomorrow's hemodialysis session to recheck your anemia. Video-Assisted Thoracoscopic Surgery (VATS): What to Expect at [facility]  Video-assisted thoracoscopic surgery (VATS) is a way to do surgery inside the chest using several small cuts (incisions) instead of one larger incision (open surgery). VATS also is different from open surgery because it does not require the doctor to cut through the ribs or breastbone (sternum). The doctor may have used VATS to find and treat problems with the lungs, heart, or spine. Or the doctor may have used VATS to operate on other organs in your chest.  Your chest may be sore where the doctor made the incisions and put in the surgical tools. This usually gets better after 2 to 3 weeks. You will have stitches or staples in the incisions. Your doctor will take these out 1 to 2 weeks after your surgery. The amount of time you will need to recover depends on the surgery you had. But you probably will need to take it easy at home for at least 1 to 2 weeks. This care sheet gives you a general idea about how long it will take for you to recover. But each person recovers at a different pace. Follow the steps below to get better as quickly as possible. How can you care for yourself at home? Activity  ? · Rest when you feel tired. Getting enough sleep will help you recover. ? · Try to walk each day. Start by walking a little more than you did the day before. Bit by bit, increase the amount you walk. Walking boosts blood flow and helps prevent pneumonia and constipation.    ? · Avoid strenuous activities, such as bicycle riding, jogging, weight lifting, or aerobic exercise, until your doctor says it is okay. ? · Avoid lifting anything that would make you strain. This may include a child, heavy grocery bags and milk containers, a heavy briefcase or backpack, cat litter or dog food bags, or a vacuum . ? · Do breathing exercises at home if instructed by your doctor. This will help prevent pneumonia. ? · Ask your doctor when it is safe to you to drive or fly. ? · You will probably need to take at least 1 to 2 weeks off from work. It depends on the type of work you do and the surgery you had.   ? · You may take showers. Do not take a bath for the first 2 weeks, or until your doctor tells you it is okay. Diet  ? · You can eat your normal diet. If your stomach is upset, try bland, low-fat foods like plain rice, broiled chicken, toast, and yogurt. ? · Drink plenty of fluids (unless your doctor tells you not to). ? · You may notice that your bowel movements are not regular right after your surgery. This is common. Try to avoid constipation and straining with bowel movements. You may want to take a fiber supplement every day. If you have not had a bowel movement after a couple of days, ask your doctor about taking a mild laxative. Medicines  ? · Your doctor will tell you if and when you can restart your medicines. He or she will also give you instructions about taking any new medicines. ? · If you take blood thinners, such as warfarin (Coumadin), clopidogrel (Plavix), or aspirin, be sure to talk to your doctor. He or she will tell you if and when to start taking those medicines again. Make sure that you understand exactly what your doctor wants you to do. ? · Take pain medicines exactly as directed. ¨ If the doctor gave you a prescription medicine for pain, take it as prescribed. ¨ If you are not taking a prescription pain medicine, ask your doctor if you can take an over-the-counter medicine.    ? · If you think your pain medicine is making you sick to your stomach:  ¨ Take your medicine after meals (unless your doctor has told you not to). ¨ Ask your doctor for a different pain medicine. ? · If your doctor prescribed antibiotics, take them as directed. Do not stop taking them just because you feel better. You need to take the full course of antibiotics. Incision care  ? · If you have strips of tape on the incisions, leave the tape on for a week or until it falls off.   ? · Wash the area daily with warm, soapy water, and pat it dry. Don't use hydrogen peroxide or alcohol, which can slow healing. You may cover the area with a gauze bandage if it weeps or rubs against clothing. Change the bandage every day. ? · Keep the area clean and dry. Follow-up care is a key part of your treatment and safety. Be sure to make and go to all appointments, and call your doctor if you are having problems. It's also a good idea to know your test results and keep a list of the medicines you take. When should you call for help? Call 911 anytime you think you may need emergency care. For example, call if:  ? · You passed out (lost consciousness). ? · You have severe trouble breathing. ? · You have sudden chest pain and shortness of breath, or you cough up blood. ?Call your doctor now or seek immediate medical care if:  ? · You are sick to your stomach or cannot keep fluids down. ? · You have pain that does not get better after you take pain medicine. ? · You have a fever over 100°F.   ? · You have signs of infection, such as:  ¨ Increased pain, swelling, warmth, or redness. ¨ Red streaks leading from the incision. ¨ Pus draining from the incision. ¨ Swollen lymph nodes in your neck, armpits, or groin. ¨ A fever. ? · You have loose stitches, or your incisions come open. ? · Bright red blood has soaked through the bandage over your incision. ? Watch closely for changes in your health, and be sure to contact your doctor if you have any problems. Where can you learn more?   Go to http://jorge-rochelle.info/. Enter I822 in the search box to learn more about \"Video-Assisted Thoracoscopic Surgery (VATS): What to Expect at Home. \"  Current as of: May 12, 2017  Content Version: 11.4  © 9061-8409 Healthwise, [employer]. Care instructions adapted under license by [employer] (which disclaims liability or warranty for this information). If you have questions about a medical condition or this instruction, always ask your healthcare professional. [address] any warranty or liability for your use of this information.

## 2018-07-02 NOTE — ED NOTES
Patient seen and discharged by Dr. Mendez. Patient thought her follow up appointment was tomorrow but realized it's Thursday. She is requesting a refill of her pain pills to get her through Thursday. I reviewed her records and she was on dilaudid 2mg q6 hours. I wrote her a RX for 14 tablets.

## 2018-07-02 NOTE — PROGRESS NOTES
Patient Name: Iona Nicole   MRN: [id number]    Jamaica Moreno is a 29 y.o. female who presents with the following:     Transition Care Management:    Admission: 6/16  Discharge: 6/28  Location: [address] [place] [address]  Diagnosis: HCAP c/b loculated pleural effsuion s/p VATS. Nurse Navigator note: reviewed    We reviewed the records. She presented with sepsis and SOB. She is taking her medications as directed & without side effects. Patient was seen in the emergency room last night due to ongoing shortness of breath and pain at prior surgical sites where VATS procedure was performed. They obtain a CTA last night which was negative for pulmonary embolism but notable for some small bilateral pleural effusions and a small amount of gas persisting the right pleural space (previously has a small pneumothorax s/p chest tube removal in hospital). She was discharged with short prescription of Dilaudid for pain control (ER note is incomplete at this time). Today, she reports no worsening change from last night's ER evaluation but continues to have pain along the incision sites and some shortness of breath. Has upcoming appointment with neurology this afternoon, who manages her chronic narcotics for chronic low back pain. She has not yet scheduled CT surgery follow-up. Is compliant with hemodialysis with the next session tomorrow. She was noted to have some anemia during the hospital status post 1 unit of packed red blood cells. She is planning on going to [place] for vacation next week; her dialysis center has set up dialysis in [place] so that she may continue her treatments there. CT Results (most recent):    Results from Hospital Encounter encounter on 07/01/18   CTA CHEST W OR W WO CONT   Narrative INDICATION: Chest pain. Recently diagnosed pneumonia.     COMPARISON: June 17, 2018 chest CT and June 27, 2018 chest radiograph    TECHNIQUE:    Routine noncontrast imaging the chest was performed for localization purposes. Then, following the uneventful intravenous administration of 75 cc HIEWIC-924,  thin helical axial images were obtained through the chest. 3D image  postprocessing was performed. CT dose reduction was achieved through use of a  standardized protocol tailored for this examination and automatic exposure  control for dose modulation. Adaptive statistical iterative reconstruction  (ASIR) was utilized. FINDINGS:    THYROID: No nodule. MEDIASTINUM: No mass or lymphadenopathy. ANA: No mass or lymphadenopathy. THORACIC AORTA: No dissection or aneurysm. PULMONARY ARTERIES: Main pulmonary artery is enlarged, measuring 3.1 cm in  diameter. No evidence of acute pulmonary emboli. TRACHEA/BRONCHI: Patent. ESOPHAGUS: No wall thickening or dilatation. HEART: Enlarged  PLEURA: Unchanged small bilateral pleural effusions. Small amount of air is  again seen in the right pleural space. LUNGS: Unchanged right lung atelectasis. INCIDENTALLY IMAGED UPPER ABDOMEN: No focal abnormality. BONES: No destructive bone lesion. ADDITIONAL COMMENTS: Small amount of gas in the right lateral chest wall. Impression IMPRESSION:  No evidence of acute pulmonary embolus. Unchanged small bilateral pleural  effusions. Small amount of gas persists in the right pleural space. Unchanged  right lung atelectasis. Review of Systems   Constitutional: Negative for fever, malaise/fatigue and weight loss. Respiratory: Positive for shortness of breath. Negative for cough, hemoptysis and wheezing. Cardiovascular: Negative for palpitations, leg swelling and PND. Chest wall pain   Gastrointestinal: Negative for abdominal pain, constipation, diarrhea, nausea and vomiting. The patient's medications, allergies, past medical history, surgical history, family history and social history were reviewed and updated where appropriate.       Prior to Admission medications    Medication Sig Start Date End Date Taking? Authorizing Provider   levoFLOXacin (LEVAQUIN) 500 mg tablet TK 1 T PO Q 48 H. TAKE AFTER HEMODIALYSIS 4/30/18  Yes Historical Provider   amLODIPine (NORVASC) 5 mg tablet Take 2 Tabs by mouth daily for 30 days. 6/28/18 7/28/18 Yes Marlin Connors MD   apixaban (ELIQUIS) 2.5 mg tablet Take 1 Tab by mouth two (2) times a day for 30 days. 6/28/18 7/28/18 Yes Marlin Connors MD   carvedilol (COREG) 25 mg tablet Take 1 Tab by mouth two (2) times daily (with meals) for 30 days. 6/28/18 7/28/18 Yes ONTMISHEYLA Barnes MD   predniSONE (DELTASONE) 5 mg tablet Take 2 Tabs by mouth daily. 6/28/18  Yes Marlin Connors MD   losartan (COZAAR) 50 mg tablet Take 1 Tab by mouth daily for 30 days. 6/29/18 7/29/18 Yes Marlin Connors MD   L. acidoph & paracasei- S therm- Bifido (AJIT-Q/RISAQUAD) 8 billion cell cap cap Take 1 Cap by mouth daily for 30 days. 6/29/18 7/29/18 Yes Marlin Connors MD   cloNIDine HCl (CATAPRES) 0.1 mg tablet Take 1 Tab by mouth three (3) times daily for 30 days. 6/28/18 7/28/18 Yes Marlin Connors MD   HYDROmorphone (DILAUDID) 2 mg tablet Take 1 Tab by mouth every six (6) hours as needed for Pain for up to 14 days. Max Daily Amount: 8 mg. 6/28/18 7/12/18 Yes Marlin Connors MD   oxyCODONE IR (ROXICODONE) 5 mg immediate release tablet Take 1 tab in AM and HALF to 1 tab in PM if needed on days where back pain is severe  6/5/18  Yes Lulu Josue, NP   senna (SENNA) 8.6 mg tablet Take 1 Tab by mouth daily as needed for Constipation. for constipation   Yes Historical Provider   amitriptyline (ELAVIL) 25 mg tablet Take 25 mg by mouth nightly as needed for Sleep. Yes Historical Provider   polyethylene glycol (MIRALAX) 17 gram packet Take 1 Packet by mouth daily. 4/21/18  Yes Yrn Owens MD   fluticasone-vilanterol (BREO ELLIPTA) 200-25 mcg/dose inhaler Take 1 Puff by inhalation daily.  Rinse mouth out after use 12/8/17  Yes Penny Zamora, NP   gemfibrozil (LOPID) 600 mg tablet Take 300 mg by mouth daily. 11/3/17  Yes Historical Provider   azaTHIOprine (IMURAN) 50 mg tablet Take 50 mg by mouth daily (after breakfast). 11/3/17  Yes Historical Provider   albuterol (PROVENTIL HFA, VENTOLIN HFA, PROAIR HFA) 90 mcg/actuation inhaler Take 1-2 Puffs by inhalation every four (4) hours as needed for Wheezing or Shortness of Breath. 10/30/17  Yes Grecia Dhillon NP   hydroxychloroquine (PLAQUENIL) 200 mg tablet Take 200 mg by mouth two (2) times a day. Yes Darnell Franks MD   allopurinol (ZYLOPRIM) 100 mg tablet Take 100 mg by mouth daily (after breakfast). Needs to TAKE on a full stomach; will cause her to be nauseated. 10/15/15  Yes Historical Provider   cyanocobalamin (VITAMIN B-12) 2,500 mcg sublingual tablet Take 1 Tab by mouth daily. 10/27/15  Yes Annie Madrigal MD   pantoprazole (PROTONIX) 40 mg tablet Take 1 Tab by mouth Daily (before breakfast). 10/23/15  Yes Bhavani Cardozo MD   ELIQUIS 5 mg tablet  5/29/18   Historical Provider   carvedilol (COREG) 6.25 mg tablet  5/29/18   Historical Provider   HYDROmorphone (DILAUDID) 4 mg tablet  4/30/18   Historical Provider       Allergies   Allergen Reactions    Compazine [Prochlorperazine Edisylate] Nausea and Vomiting and Palpitations           OBJECTIVE    Visit Vitals    BP (!) 150/100 (BP 1 Location: Left arm, BP Patient Position: Sitting)    Pulse 75    Temp 99.3 °F (37.4 °C) (Oral)    Resp 20    Ht 5' 5\" (1.651 m)    Wt 154 lb (69.9 kg)    SpO2 97%    BMI 25.63 kg/m2       Physical Exam   Constitutional: She is oriented to person, place, and time and well-developed, well-nourished, and in no distress. No distress. Eyes: Conjunctivae and EOM are normal. Pupils are equal, round, and reactive to light. Cardiovascular: Normal rate, regular rhythm and normal heart sounds. Exam reveals no gallop and no friction rub. No murmur heard. Pulmonary/Chest: Effort normal. No respiratory distress. She has no wheezes.  She has rales (mild rales at lung bases b/l). She exhibits tenderness (moderate TTP along right chest wall where prior incisions are. Pt is wearing a tight sports bra). Musculoskeletal: Normal range of motion. Neurological: She is alert and oriented to person, place, and time. Gait normal.   Skin: Skin is warm and dry. No rash noted. She is not diaphoretic. Psychiatric: Mood, memory, affect and judgment normal.   Nursing note and vitals reviewed. Grace Naqvi is a 29 y.o. female who presents today for:    1. Hospital discharge follow-up  Overall improving although continues to have pain along chest wall where prior VATS procedure performed. Encourage close follow-up with her cardiothoracic surgeon; gave her the contact information to make appointment as soon as possible as she is leaving for vacation next week. 2. History of pleural effusion  F/u with CT surgery. 3. ESRD (end stage renal disease) ([facility] [address]  Continue HD schedule. 4. Anemia in chronic kidney disease, unspecified CKD stage  Will have dialysis tomorrow; instructed patient to request of her kidney doctor to recheck her CBC to ensure that her anemia is stable. Medications Discontinued During This Encounter   Medication Reason    HYDROmorphone (DILAUDID) 2 mg tablet Duplicate Order       Follow-up Disposition:  Return if symptoms worsen or fail to improve. Medication risks/benefits/costs/interactions/alternatives discussed with patient. Advised patient to call back or return to office if symptoms worsen/change/persist. If patient cannot reach us or should anything more severe/urgent arise he/she should proceed directly to the nearest emergency department. Discussed expected course/resolution/complications of diagnosis in detail with patient. Patient given a written after visit summary which includes his/her diagnoses, current medications and vitals.   Patient expressed understanding with the diagnosis and Colton Burt M.D.

## 2018-07-02 NOTE — PROGRESS NOTES
Chief Complaint   Patient presents with   [facility] Follow Up     chest pain    Shortness of Breath     x 5 days     1. Have you been to the ER, urgent care clinic since your last visit? Hospitalized since your last visit? Yes, [facility] [place] due to chest pain last night. 2. Have you seen or consulted any other health care providers outside of the [address] [place] [address] since your last visit? Include any pap smears or colon screening.  No

## 2018-07-02 NOTE — PROGRESS NOTES
Reason for Visit:  Follow-up laparoscopic washout of intra-abdominal abscess    Brief History:  30 yo woman with ESRD on peritoneal dialysis who developed infected PD catheter s/p removal by Vascular surgery. She subsequent developed intra-peritoneal abscess requiring laparoscopy and washout with JENNIFER drain placement. Pt present to clinic today to discuss JENNIFER drain removal.  Pt is still putting out about 100 ml fluid daily from JENNIFER drain. Fluid is serous but mildly cloudy. No fever or chills. Visit Vitals    BP (!) 140/94 (BP 1 Location: Left arm, BP Patient Position: Sitting)    Pulse 98    Temp 98.2 °F (36.8 °C) (Oral)    Resp 18    Ht 5' 5\" (1.651 m)    Wt 138 lb (62.6 kg)    SpO2 98%    BMI 22.96 kg/m2         Physical Exam:    Gen:  NAD  Pulm:  Unlabored  Abd:  S/ND/appropriate TTP  Wound:  C/D/I  JENNIFER drain serous with minimal particulate    30 yo woman with intra-abdominal abscess    - Intra-abdominal abscess:  No acute surgical issues.   - Keep JENNIFER drain for now  - Will see patient in 2 weeks for possible JENNIFER drain removal

## 2018-07-02 NOTE — Clinical Note
Cuco Koenig, I saw this patient today for her SURJIT appointment. Can you make sure that she schedules an appointment with cardiothoracic surgeon Chavo Devi MD this week? I gave her the contact information but just want to make sure she has close follow-up since she will be going to vacation next week and still has some ongoing pain from the VATS. Thanks!

## 2018-07-02 NOTE — MR AVS SNAPSHOT
Logan Vernon 
 
 
 [address] 
[phone] Patient: Иван Crump MRN: [id number] :[id number] Visit Information Date & Time Provider Department Dept. Phone Encounter #  
 2018 11:00 AM Vanda Gomez MD [address] [place] [phone] [id number] Follow-up Instructions Return if symptoms worsen or fail to improve. Your Appointments 2018  1:00 PM  
Follow Up with Jose De Jesus Alamo MD  
[facility]) Appt Note: pain management leathw [place] [address] [facility] [address] [phone]  
  
   
 [place] [address] [facility] [address] [address] [address] 2018  2:00 PM  
ESTABLISHED PATIENT with Kasia aPl MD  
[facility] SCHEDULING) Appt Note: appt schd by Yohana Shirley NP hosp f/u HTN kmr  
 [address]  [address] [place] [address]  
[address] [place] [address]  
  
    
 2018  1:00 PM  
ECHO CARDIOGRAMS 2D with Bronson Arzola [facility] ([place] SCHEDULING) Appt Note: appt schd by Yohana Shirley NP echo for MR 1:00 Dr. Effie Todd 2:00 kmr [address]  [address] [place] [address]  
[address] [facility]  
  
    
 2018  2:00 PM  
ESTABLISHED PATIENT with Kasia Pal MD  
[facility] Appt Note: appt schd by Yohana Shirley NP echo for MR 1:00 Dr. Effie Todd 2:00 kmr [address] [place]  
[phone] Upcoming Health Maintenance Date Due DTaP/Tdap/Td series (1 - Tdap) 2007 Pneumococcal 19-64 Highest Risk (2 of 3 - PCV13) 10/1/2010 MEDICARE YEARLY EXAM 3/14/2018 Influenza Age 5 to Adult 2018 PAP AKA CERVICAL CYTOLOGY 4/13/2019 Allergies as of 7/2/2018  Review Complete On: 7/2/2018 By: Linda Zacarias Severity Noted Reaction Type Reactions Compazine [Prochlorperazine Edisylate] High 07/11/2016   Systemic Nausea and Vomiting, Palpitations Current Immunizations  Reviewed on 3/16/2018 Name Date Influenza Vaccine 9/12/2017, 9/9/2012 Influenza Vaccine Split 9/1/2011 ZZZ-RETIRED (DO NOT USE) Pneumococcal Vaccine (Unspecified Type) 10/1/2009 Not reviewed this visit Vitals BP Pulse Temp Resp Height(growth percentile) Weight(growth percentile) (!) 150/100 (BP 1 Location: Left arm, BP Patient Position: Sitting) 75 99.3 °F (37.4 °C) (Oral) 20 5' 5\" (1.651 m) 154 lb (69.9 kg) SpO2 BMI OB Status Smoking Status 97% 25.63 kg/m2 Medically Induced Never Smoker Vitals History BMI and BSA Data Body Mass Index Body Surface Area  
 25.63 kg/m 2 1.79 m 2 Preferred Pharmacy Pharmacy Name Phone [address], [facility] [address] Your Updated Medication List  
  
   
This list is accurate as of 7/2/18 11:37 AM.  Always use your most recent med list.  
  
  
  
  
 albuterol 90 mcg/actuation inhaler Commonly known as:  PROVENTIL HFA, VENTOLIN HFA, PROAIR HFA Take 1-2 Puffs by inhalation every four (4) hours as needed for Wheezing or Shortness of Breath. allopurinol 100 mg tablet Commonly known as:  Ratna Brown Take 100 mg by mouth daily (after breakfast). Needs to TAKE on a full stomach; will cause her to be nauseated. amitriptyline 25 mg tablet Commonly known as:  ELAVIL Take 25 mg by mouth nightly as needed for Sleep. amLODIPine 5 mg tablet Commonly known as:  Viet Kessler Take 2 Tabs by mouth daily for 30 days. * ELIQUIS 5 mg tablet Generic drug:  apixaban * apixaban 2.5 mg tablet Commonly known as:  Consuelo Clifton Take 1 Tab by mouth two (2) times a day for 30 days. azaTHIOprine 50 mg tablet Commonly known as:  [employer] Take 50 mg by mouth daily (after breakfast). * carvedilol 6.25 mg tablet Commonly known as:  COREG  
  
 * carvedilol 25 mg tablet Commonly known as:  Media Thayer Take 1 Tab by mouth two (2) times daily (with meals) for 30 days. cloNIDine HCl 0.1 mg tablet Commonly known as:  CATAPRES Take 1 Tab by mouth three (3) times daily for 30 days. cyanocobalamin 2,500 mcg sublingual tablet Commonly known as:  VITAMIN B-12 Take 1 Tab by mouth daily. fluticasone-vilanterol 200-25 mcg/dose inhaler Commonly known as:  BREO ELLIPTA Take 1 Puff by inhalation daily. Rinse mouth out after use  
  
 gemfibrozil 600 mg tablet Commonly known as:  LOPID Take 300 mg by mouth daily. * HYDROmorphone 4 mg tablet Commonly known as:  DILAUDID  
  
 * HYDROmorphone 2 mg tablet Commonly known as:  DILAUDID Take 1 Tab by mouth every six (6) hours as needed for Pain for up to 14 days. Max Daily Amount: 8 mg. L. acidoph & paracasei- S therm- Bifido 8 billion cell Cap cap Commonly known as:  AJIT-Q/RISAQUAD Take 1 Cap by mouth daily for 30 days. levoFLOXacin 500 mg tablet Commonly known as:  LEVAQUIN  
TK 1 T PO Q 48 H. TAKE AFTER HEMODIALYSIS  
  
 losartan 50 mg tablet Commonly known as:  COZAAR Take 1 Tab by mouth daily for 30 days. oxyCODONE IR 5 mg immediate release tablet Commonly known as:  [place] Ditto Take 1 tab in AM and HALF to 1 tab in PM if needed on days where back pain is severe  
  
 pantoprazole 40 mg tablet Commonly known as:  PROTONIX Take 1 Tab by mouth Daily (before breakfast). PLAQUENIL 200 mg tablet Generic drug:  hydroxychloroquine Take 200 mg by mouth two (2) times a day. polyethylene glycol 17 gram packet Commonly known as:  Lori  Take 1 Packet by mouth daily. predniSONE 5 mg tablet Commonly known as:  [employer] Take 2 Tabs by mouth daily. Senna 8.6 mg tablet Generic drug:  senna Take 1 Tab by mouth daily as needed for Constipation. for constipation * Notice: This list has 6 medication(s) that are the same as other medications prescribed for you. Read the directions carefully, and ask your doctor or other care provider to review them with you. Follow-up Instructions Return if symptoms worsen or fail to improve. Patient Instructions Please call your general surgeon to schedule an appointment as soon as possible. [address] [place] [address] [facility] [address] [place] [address], [address] 
 
[phone] Please ask your nephrologist to check your blood counts during tomorrow's hemodialysis session to recheck your anemia. Video-Assisted Thoracoscopic Surgery (VATS): What to Expect at [facility] Your Recovery Video-assisted thoracoscopic surgery (VATS) is a way to do surgery inside the chest using several small cuts (incisions) instead of one larger incision (open surgery). VATS also is different from open surgery because it does not require the doctor to cut through the ribs or breastbone (sternum). The doctor may have used VATS to find and treat problems with the lungs, heart, or spine. Or the doctor may have used VATS to operate on other organs in your chest. 
Your chest may be sore where the doctor made the incisions and put in the surgical tools. This usually gets better after 2 to 3 weeks. You will have stitches or staples in the incisions. Your doctor will take these out 1 to 2 weeks after your surgery. The amount of time you will need to recover depends on the surgery you had. But you probably will need to take it easy at home for at least 1 to 2 weeks.  
This care sheet gives you a general idea about how long it will take for you to recover. But each person recovers at a different pace. Follow the steps below to get better as quickly as possible. How can you care for yourself at home? Activity ? · Rest when you feel tired. Getting enough sleep will help you recover. ? · Try to walk each day. Start by walking a little more than you did the day before. Bit by bit, increase the amount you walk. Walking boosts blood flow and helps prevent pneumonia and constipation. ? · Avoid strenuous activities, such as bicycle riding, jogging, weight lifting, or aerobic exercise, until your doctor says it is okay. ? · Avoid lifting anything that would make you strain. This may include a child, heavy grocery bags and milk containers, a heavy briefcase or backpack, cat litter or dog food bags, or a vacuum . ? · Do breathing exercises at home if instructed by your doctor. This will help prevent pneumonia. ? · Ask your doctor when it is safe to you to drive or fly. ? · You will probably need to take at least 1 to 2 weeks off from work. It depends on the type of work you do and the surgery you had.  
? · You may take showers. Do not take a bath for the first 2 weeks, or until your doctor tells you it is okay. Diet ? · You can eat your normal diet. If your stomach is upset, try bland, low-fat foods like plain rice, broiled chicken, toast, and yogurt. ? · Drink plenty of fluids (unless your doctor tells you not to). ? · You may notice that your bowel movements are not regular right after your surgery. This is common. Try to avoid constipation and straining with bowel movements. You may want to take a fiber supplement every day. If you have not had a bowel movement after a couple of days, ask your doctor about taking a mild laxative. Medicines ? · Your doctor will tell you if and when you can restart your medicines. He or she will also give you instructions about taking any new medicines. ? · If you take blood thinners, such as warfarin (Coumadin), clopidogrel (Plavix), or aspirin, be sure to talk to your doctor. He or she will tell you if and when to start taking those medicines again. Make sure that you understand exactly what your doctor wants you to do. ? · Take pain medicines exactly as directed. ¨ If the doctor gave you a prescription medicine for pain, take it as prescribed. ¨ If you are not taking a prescription pain medicine, ask your doctor if you can take an over-the-counter medicine. ? · If you think your pain medicine is making you sick to your stomach: 
¨ Take your medicine after meals (unless your doctor has told you not to). ¨ Ask your doctor for a different pain medicine. ? · If your doctor prescribed antibiotics, take them as directed. Do not stop taking them just because you feel better. You need to take the full course of antibiotics. Incision care ? · If you have strips of tape on the incisions, leave the tape on for a week or until it falls off.  
? · Wash the area daily with warm, soapy water, and pat it dry. Don't use hydrogen peroxide or alcohol, which can slow healing. You may cover the area with a gauze bandage if it weeps or rubs against clothing. Change the bandage every day. ? · Keep the area clean and dry. Follow-up care is a key part of your treatment and safety. Be sure to make and go to all appointments, and call your doctor if you are having problems. It's also a good idea to know your test results and keep a list of the medicines you take. When should you call for help? Call 911 anytime you think you may need emergency care. For example, call if: 
? · You passed out (lost consciousness). ? · You have severe trouble breathing. ? · You have sudden chest pain and shortness of breath, or you cough up blood. ?Call your doctor now or seek immediate medical care if: 
? · You are sick to your stomach or cannot keep fluids down. ? · You have pain that does not get better after you take pain medicine. ? · You have a fever over 100°F.  
? · You have signs of infection, such as: 
¨ Increased pain, swelling, warmth, or redness. ¨ Red streaks leading from the incision. ¨ Pus draining from the incision. ¨ Swollen lymph nodes in your neck, armpits, or groin. ¨ A fever. ? · You have loose stitches, or your incisions come open. ? · Bright red blood has soaked through the bandage over your incision. ? Watch closely for changes in your health, and be sure to contact your doctor if you have any problems. Where can you learn more? Go to http://jorge-rochelle.info/. Enter T769 in the search box to learn more about \"Video-Assisted Thoracoscopic Surgery (VATS): What to Expect at Home. \" Current as of: May 12, 2017 Content Version: 11.4 © 8956-4400 [employer]. Care instructions adapted under license by [employer] (which disclaims liability or warranty for this information). If you have questions about a medical condition or this instruction, always ask your healthcare professional. [place] [address] any warranty or liability for your use of this information. Introducing [facility]! Thompson Godinez introduces [employer] patient portal. Now you can access parts of your medical record, email your doctor's office, and request medication refills online. 1. In your internet browser, go to [email or web address] 2. Click on the First Time User? Click Here link in the Sign In box. You will see the New Member Sign Up page. 3. Enter your [employer] Access Code exactly as it appears below. You will not need to use this code after youve completed the sign-up process. If you do not sign up before the expiration date, you must request a new code. · [employer] Access Code: [email or web address] Expires: 9/1/2018  9:07 PM 
 
 4. Enter the last four digits of your Social Security Number (xxxx) and Date of Birth (mm/dd/yyyy) as indicated and click Submit. You will be taken to the next sign-up page. 5. Create a [employer] ID. This will be your [employer] login ID and cannot be changed, so think of one that is secure and easy to remember. 6. Create a [employer] password. You can change your password at any time. 7. Enter your Password Reset Question and Answer. This can be used at a later time if you forget your password. 8. Enter your e-mail address. You will receive e-mail notification when new information is available in [address] 9. Click Sign Up. You can now view and download portions of your medical record. 10. Click the Download Summary menu link to download a portable copy of your medical information. If you have questions, please visit the Frequently Asked Questions section of the [employer] website. Remember, [employer] is NOT to be used for urgent needs. For medical emergencies, dial 911. Now available from your iPhone and Android! Please provide this summary of care documentation to your next provider. Your primary care clinician is listed as Sonia Orellana. If you have any questions after today's visit, please call [phone].

## 2018-07-02 NOTE — ED PROVIDER NOTES
HPI Comments: 29 y.o. female with past medical history significant for Lupus, ESRD, Hypertension, CKD, who presents from Home with chief complaint of Right Rib Pain. Patient was recently admitted to [facility] on 6/16 to 6/28 for Health care associated Pneumonia with partially loculated pleural effusion CXR 6/17 Unchanged mild right pleural effusion with patchy opacification right base, CT chest/abd 6/17 The pleural fluid on the right extends more superiorly in the right hemithorax and there is a more focal collection laterally measuring 5.4 cm may be partially loculated. Patient underwent  VATS on 6/21. She completed antibiotics ID and thoracis signed off and cleared for discharge. Patient last completed Dialysis      Patient denies fever, chills, cough, congestion,  abdominal pain, nausea, vomiting, diarrhea, dysuria, or difficulty with urination. There are no other acute medical concerns at this time. PCP: Sonia Oerllana NP    Note written by Boy Rodriguez, as dictated by Ryan Phillip MD 9:10 PM    The history is provided by the patient. Past Medical History:   Diagnosis Date    Anemia     secondary to lupus    Asthma     no inhaler use in past 2 to 3 years    Carditis     Chronic kidney disease     ESRD    Chronic pain     DDD (degenerative disc disease), lumbar     ESRD (end stage renal disease) (HCC)     GERD (gastroesophageal reflux disease)     Heart failure ([address]     Hemodialysis patient ([address] 12/21/2017    [address]  Tuesday,  Thursday,  and Saturday. 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     Poor historian 01/17/2018 With medications    Thromboembolus [facility])     lungs    Transfusion history     Last Transfusion 2017  at [facility]       Past Surgical History:   Procedure Laterality Date    HX  SECTION  11/2006    x1    HX OTHER SURGICAL  9/16/15    INSERTION PD CATH; Removed 2017    HX VASCULAR ACCESS Right 2017    Double-Lumen henry catheter upper chest         Family History:   Problem Relation Age of Onset    Diabetes Father     Hypertension Father     Cancer Other      aunt with breast cancer    Diabetes Mother        Social History     Social History    Marital status: SINGLE     Spouse name: N/A    Number of children: N/A    Years of education: N/A     Occupational History    Not on file. Social History Main Topics    Smoking status: Never Smoker    Smokeless tobacco: Never Used    Alcohol use No    Drug use: Yes     Special: Prescription, OTC    Sexual activity: Not Currently     Other Topics Concern     Service No    Blood Transfusions No    Caffeine Concern No    Occupational Exposure No    Hobby Hazards No    Sleep Concern No    Stress Concern No    Weight Concern No    Special Diet No    Back Care Yes     low back pain    Exercise No    Bike Helmet No    Seat Belt Yes    Self-Exams No     Social History Narrative    Lives with parents and daughter. ALLERGIES: Compazine [prochlorperazine edisylate]    Review of Systems   Constitutional: Negative for chills and fever. HENT: Negative for congestion. Respiratory: Positive for shortness of breath. Negative for cough. Cardiovascular: Positive for chest pain. Gastrointestinal: Negative for abdominal pain, diarrhea, nausea and vomiting. Genitourinary: Negative for difficulty urinating and dysuria. All other systems reviewed and are negative.       Vitals:    18 1749   BP: (!) 162/109   Pulse: (!) 125   Resp: 16   Temp: 98.7 °F (37.1 °C)   SpO2: 97%   Weight: 62.1 kg (137 lb)   Height: 5' 5\" (1.651 m)            Physical Exam   Constitutional: She is oriented to person, place, and time. She appears well-developed and well-nourished. No distress. HENT:   Head: Normocephalic and atraumatic. Nose: Nose normal.   Mouth/Throat: Oropharynx is clear and moist. No oropharyngeal exudate. Eyes: Conjunctivae and EOM are normal. Right eye exhibits no discharge. Left eye exhibits no discharge. No scleral icterus. Neck: Normal range of motion. Neck supple. No JVD present. No tracheal deviation present. No thyromegaly present. Cardiovascular: Regular rhythm, normal heart sounds and intact distal pulses. Tachycardia present. Exam reveals no gallop and no friction rub. No murmur heard. Pulmonary/Chest: Effort normal and breath sounds normal. No stridor. No respiratory distress. She has no wheezes. She has no rales. Chest tube site that is erythematous draining, tender to touch. Right chest wall tenderness with overlying erythema extending to right breast   Abdominal: Bowel sounds are normal. She exhibits no distension and no mass. There is no tenderness. There is no rebound. Musculoskeletal: Normal range of motion. She exhibits no edema or tenderness. Lymphadenopathy:     She has no cervical adenopathy. Neurological: She is alert and oriented to person, place, and time. No cranial nerve deficit. Coordination normal.   Skin: Skin is warm and dry. No rash noted. She is not diaphoretic. No pallor. Psychiatric: She has a normal mood and affect. Her behavior is normal. Judgment and thought content normal.   Nursing note and vitals reviewed.    Note written by Boy Rodriguez, as dictated by Ryan Phillip MD 9:12 PM    MDM  Number of Diagnoses or Management Options  Chest wall pain:      Amount and/or Complexity of Data Reviewed  Clinical lab tests: ordered and reviewed  Tests in the radiology section of CPT®: reviewed and ordered  Review and summarize past medical records: yes  Discuss the patient with other providers: yes  Independent visualization of images, tracings, or specimens: yes    Risk of Complications, Morbidity, and/or Mortality  Presenting problems: moderate  Diagnostic procedures: moderate  Management options: moderate    Patient Progress  Patient progress: stable        ED Course       Procedures      10:10 PM  The patient has been reevaluated. The patient is ready for discharge. The patient's signs, symptoms, diagnosis, and discharge instructions have been discussed and the patient/ family has conveyed their understanding. The patient is to follow up as recommended or return to the ED should their symptoms worsen. Plan has been discussed and the patient is in agreement. LABORATORY TESTS:  No results found for this or any previous visit (from the past 12 hour(s)). IMAGING RESULTS:  CTA CHEST W OR W WO CONT   Final Result        No results found. MEDICATIONS GIVEN:  Medications   sodium chloride 0.9 % bolus infusion 100 mL (100 mL IntraVENous New Bag 7/1/18 4203)   iopamidol (ISOVUE-370) 76 % injection 100 mL (75 mL IntraVENous Given 7/1/18 5453)   sodium chloride (NS) flush 10 mL (10 mL IntraVENous Given 7/1/18 2343)   HYDROmorphone (DILAUDID) syringe 1 mg (1 mg IntraVENous Given 7/1/18 5876)       IMPRESSION:  1. Chest wall pain        PLAN:  1. Discharge Medication List as of 7/1/2018 11:47 PM      CONTINUE these medications which have NOT CHANGED    Details   amLODIPine (NORVASC) 5 mg tablet Take 2 Tabs by mouth daily for 30 days. , No Print, Disp-30 Tab, R-0      apixaban (ELIQUIS) 2.5 mg tablet Take 1 Tab by mouth two (2) times a day for 30 days. , Print, Disp-60 Tab, R-0      carvedilol (COREG) 25 mg tablet Take 1 Tab by mouth two (2) times daily (with meals) for 30 days. , Print, Disp-60 Tab, R-0      predniSONE (DELTASONE) 5 mg tablet Take 2 Tabs by mouth daily. , No Print, Disp-30 Tab, R-0      losartan (COZAAR) 50 mg tablet Take 1 Tab by mouth daily for 30 days. , Print, Disp-30 Tab, R-0      L. acidoph & paracasei- S therm- Bifido (AJIT-Q/RISAQUAD) 8 billion cell cap cap Take 1 Cap by mouth daily for 30 days. , Print, Disp-30 Cap, R-0      cloNIDine HCl (CATAPRES) 0.1 mg tablet Take 1 Tab by mouth three (3) times daily for 30 days. , Print, Disp-90 Tab, R-0      HYDROmorphone (DILAUDID) 2 mg tablet Take 1 Tab by mouth every six (6) hours as needed for Pain for up to 14 days. Max Daily Amount: 8 mg., Print, Disp-14 Tab, R-0      oxyCODONE IR (ROXICODONE) 5 mg immediate release tablet Take 1 tab in AM and HALF to 1 tab in PM if needed on days where back pain is severe , Print, Disp-40 Tab, R-0      senna (SENNA) 8.6 mg tablet Take 1 Tab by mouth daily as needed for Constipation. for constipation, Historical Med      amitriptyline (ELAVIL) 25 mg tablet Take 25 mg by mouth nightly as needed for Sleep., Historical Med      polyethylene glycol (MIRALAX) 17 gram packet Take 1 Packet by mouth daily. , Print, Disp-30 Packet, R-0      fluticasone-vilanterol (BREO ELLIPTA) 200-25 mcg/dose inhaler Take 1 Puff by inhalation daily. Rinse mouth out after use, Normal, Disp-1 Inhaler, R-5      gemfibrozil (LOPID) 600 mg tablet Take 300 mg by mouth daily. , Historical Med      azaTHIOprine (IMURAN) 50 mg tablet Take 50 mg by mouth daily (after breakfast). , Historical Med      albuterol (PROVENTIL HFA, VENTOLIN HFA, PROAIR HFA) 90 mcg/actuation inhaler Take 1-2 Puffs by inhalation every four (4) hours as needed for Wheezing or Shortness of Breath., Print, Disp-1 Inhaler, R-2      hydroxychloroquine (PLAQUENIL) 200 mg tablet Take 200 mg by mouth two (2) times a day., Historical Med      allopurinol (ZYLOPRIM) 100 mg tablet Take 100 mg by mouth daily (after breakfast). Needs to TAKE on a full stomach; will cause her to be nauseated., Historical Med      cyanocobalamin (VITAMIN B-12) 2,500 mcg sublingual tablet Take 1 Tab by mouth daily. , Normal, Disp-90 Tab, R-1      pantoprazole (PROTONIX) 40 mg tablet Take 1 Tab by mouth Daily (before breakfast). , Print, Disp-30 Tab, R-0           2.    Follow-up Information     Follow up With Details Comments Rajesh Colorado NP  If symptoms worsen [address]  [phone]      [facility] EMERGENCY DEP  If symptoms worsen [address] [facility]  [phone]            Return to ED for new or worsening symptoms       Ivonne Belle MD

## 2018-07-02 NOTE — ED NOTES
Verbal shift change report given to Shakira Dorado RN (oncoming nurse) by DANITZA Martinez (offgoing nurse). Report included the following information SBAR, ED Summary, MAR and Recent Results. Ifeoma Rogers

## 2018-07-02 NOTE — DISCHARGE INSTRUCTIONS
Musculoskeletal Chest Pain: Care Instructions  Your Care Instructions    Chest pain is not always a sign that something is wrong with your heart or that you have another serious problem. The doctor thinks your chest pain is caused by strained muscles or ligaments, inflamed chest cartilage, or another problem in your chest, rather than by your heart. You may need more tests to find the cause of your chest pain. Follow-up care is a key part of your treatment and safety. Be sure to make and go to all appointments, and call your doctor if you are having problems. It's also a good idea to know your test results and keep a list of the medicines you take. How can you care for yourself at home? · Take pain medicines exactly as directed. ¨ If the doctor gave you a prescription medicine for pain, take it as prescribed. ¨ If you are not taking a prescription pain medicine, ask your doctor if you can take an over-the-counter medicine. · Rest and protect the sore area. · Stop, change, or take a break from any activity that may be causing your pain or soreness. · Put ice or a cold pack on the sore area for 10 to 20 minutes at a time. Try to do this every 1 to 2 hours for the next 3 days (when you are awake) or until the swelling goes down. Put a thin cloth between the ice and your skin. · After 2 or 3 days, apply a heating pad set on low or a warm cloth to the area that hurts. Some doctors suggest that you go back and forth between hot and cold. · Do not wrap or tape your ribs for support. This may cause you to take smaller breaths, which could increase your risk of lung problems. · Mentholated creams such as Bengay or Icy Hot may soothe sore muscles. Follow the instructions on the package. · Follow your doctor's instructions for exercising. · Gentle stretching and massage may help you get better faster. Stretch slowly to the point just before pain begins, and hold the stretch for at least 15 to 30 seconds.  Do this 3 or 4 times a day. Stretch just after you have applied heat. · As your pain gets better, slowly return to your normal activities. Any increased pain may be a sign that you need to rest a while longer. When should you call for help? Call 911 anytime you think you may need emergency care. For example, call if:  ? · You have chest pain or pressure. This may occur with:  ¨ Sweating. ¨ Shortness of breath. ¨ Nausea or vomiting. ¨ Pain that spreads from the chest to the neck, jaw, or one or both shoulders or arms. ¨ Dizziness or lightheadedness. ¨ A fast or uneven pulse. After calling 911, chew 1 adult-strength aspirin. Wait for an ambulance. Do not try to drive yourself. ? · You have sudden chest pain and shortness of breath, or you cough up blood. ?Call your doctor now or seek immediate medical care if:  ? · You have any trouble breathing. ? · Your chest pain gets worse. ? · Your chest pain occurs consistently with exercise and is relieved by rest.   ? Watch closely for changes in your health, and be sure to contact your doctor if:  ? · Your chest pain does not get better after 1 week. Where can you learn more? Go to http://jorge-rochelle.info/. Enter V293 in the search box to learn more about \"Musculoskeletal Chest Pain: Care Instructions. \"  Current as of: March 20, 2017  Content Version: 11.4  © 0163-8896 [employer] Care instructions adapted under license by [employer] (which disclaims liability or warranty for this information). If you have questions about a medical condition or this instruction, always ask your healthcare professional. [place] [address] any warranty or liability for your use of this information.

## 2018-07-02 NOTE — MR AVS SNAPSHOT
[facility] 
 
 
 [place] [address] [address] [phone] Patient: Jeffery Kiran MRN: [id number] :1985 Visit Information Date & Time Provider Department Dept. Phone Encounter #  
 2018  1:00 PM Sbaas Clark MD [facility] [phone] [id number] Follow-up Instructions Return in about 4 weeks (around 2018). Your Appointments 2018  2:00 PM  
ESTABLISHED PATIENT with Tila Palacio MD  
[facility] SCHEDULING) Appt Note: appt schd by Levi Pepe NP hosp f/u HTN kmr  
 [address] [place] [address]  
[address] [place] [address]  
  
    
 2018  3:00 PM  
Follow Up with Sabas Clark MD  
[facility]) Appt Note: follow up pain mgt [place] [address] [address] [phone]  
  
   
 [facility]  
  
    
 2018  1:00 PM  
ECHO CARDIOGRAMS 2D with Jeramie Crane [facility] SCHEDULING) Appt Note: appt schd by Levi Pepe NP echo for MR 1:00 Dr. Hiwot Marie 2:00 kmr [address] [place]  
[address] [facility]  
  
    
 2018  2:00 PM  
ESTABLISHED PATIENT with Tila Palacio MD  
[facility] ([facility]) Appt Note: appt schd by Levi Pepe NP echo for MR 1:00 Dr. Hiwot Marie 2:00 kmr [address] [place]  
[phone] Upcoming Health Maintenance Date Due DTaP/Tdap/Td series (1 - Tdap) 2007 Pneumococcal 19-64 Highest Risk (2 of 3 - PCV13) 10/1/2010 MEDICARE YEARLY EXAM 3/14/2018 Influenza Age 5 to Adult 8/1/2018 PAP AKA CERVICAL CYTOLOGY 4/13/2019 Allergies as of 7/2/2018  Review Complete On: 7/2/2018 By: Radha Santos MD  
  
 Severity Noted Reaction Type Reactions Compazine [Prochlorperazine Edisylate] High 07/11/2016   Systemic Nausea and Vomiting, Palpitations Current Immunizations  Reviewed on 3/16/2018 Name Date Influenza Vaccine 9/12/2017, 9/9/2012 Influenza Vaccine Split 9/1/2011 ZZZ-RETIRED (DO NOT USE) Pneumococcal Vaccine (Unspecified Type) 10/1/2009 Not reviewed this visit You Were Diagnosed With   
  
 Codes Comments Chronic bilateral low back pain without sciatica    -  Primary ICD-10-CM: M54.5, G89.29 ICD-9-CM: 724.2, 338.29 Rib pain on right side     ICD-10-CM: R07.81 ICD-9-CM: 786.50 Chronic, continuous use of opioids     ICD-10-CM: F11.90 ICD-9-CM: 305.51 Vitals BP Pulse Height(growth percentile) Weight(growth percentile) SpO2 BMI  
 (!) 138/94 78 5' 5\" (1.651 m) 154 lb (69.9 kg) 93% 25.63 kg/m2 OB Status Smoking Status Medically Induced Never Smoker BMI and BSA Data Body Mass Index Body Surface Area  
 25.63 kg/m 2 1.79 m 2 Preferred Pharmacy Pharmacy Name Phone [address], [address] Your Updated Medication List  
  
   
This list is accurate as of 7/2/18  1:39 PM.  Always use your most recent med list.  
  
  
  
  
 albuterol 90 mcg/actuation inhaler Commonly known as:  PROVENTIL HFA, VENTOLIN HFA, PROAIR HFA Take 1-2 Puffs by inhalation every four (4) hours as needed for Wheezing or Shortness of Breath. allopurinol 100 mg tablet Commonly known as:  Alley Carpenter Take 100 mg by mouth daily (after breakfast). Needs to TAKE on a full stomach; will cause her to be nauseated. amitriptyline 25 mg tablet Commonly known as:  ELAVIL  
 Take 25 mg by mouth nightly as needed for Sleep. amLODIPine 5 mg tablet Commonly known as:  Shannon Rodríguez Take 2 Tabs by mouth daily for 30 days. * ELIQUIS 5 mg tablet Generic drug:  apixaban * apixaban 2.5 mg tablet Commonly known as:  Virgel Mary Take 1 Tab by mouth two (2) times a day for 30 days. azaTHIOprine 50 mg tablet Commonly known as:  [employer] Take 50 mg by mouth daily (after breakfast). * carvedilol 6.25 mg tablet Commonly known as:  COREG  
  
 * carvedilol 25 mg tablet Commonly known as:  Lj Been Take 1 Tab by mouth two (2) times daily (with meals) for 30 days. cloNIDine HCl 0.1 mg tablet Commonly known as:  CATAPRES Take 1 Tab by mouth three (3) times daily for 30 days. cyanocobalamin 2,500 mcg sublingual tablet Commonly known as:  VITAMIN B-12 Take 1 Tab by mouth daily. fluticasone-vilanterol 200-25 mcg/dose inhaler Commonly known as:  BREO ELLIPTA Take 1 Puff by inhalation daily. Rinse mouth out after use  
  
 gemfibrozil 600 mg tablet Commonly known as:  LOPID Take 300 mg by mouth daily. HYDROmorphone 4 mg tablet Commonly known as:  DILAUDID  
  
 L. acidoph & paracasei- S therm- Bifido 8 billion cell Cap cap Commonly known as:  AJIT-Q/RISAQUAD Take 1 Cap by mouth daily for 30 days. levoFLOXacin 500 mg tablet Commonly known as:  LEVAQUIN  
TK 1 T PO Q 48 H. TAKE AFTER HEMODIALYSIS  
  
 losartan 50 mg tablet Commonly known as:  COZAAR Take 1 Tab by mouth daily for 30 days. oxyCODONE-acetaminophen 5-325 mg per tablet Commonly known as:  PERCOCET Take 1 Tab by mouth two (2) times daily as needed for Pain (severe back pain). Max Daily Amount: 2 Tabs. pantoprazole 40 mg tablet Commonly known as:  PROTONIX Take 1 Tab by mouth Daily (before breakfast). PLAQUENIL 200 mg tablet Generic drug:  hydroxychloroquine Take 200 mg by mouth two (2) times a day. polyethylene glycol 17 gram packet Commonly known as:  Bruna Jann Take 1 Packet by mouth daily. predniSONE 5 mg tablet Commonly known as:  Rudy Degroot Take 2 Tabs by mouth daily. Senna 8.6 mg tablet Generic drug:  senna Take 1 Tab by mouth daily as needed for Constipation. for constipation * Notice: This list has 4 medication(s) that are the same as other medications prescribed for you. Read the directions carefully, and ask your doctor or other care provider to review them with you. Prescriptions Printed Refills  
 oxyCODONE-acetaminophen (PERCOCET) 5-325 mg per tablet 0 Sig: Take 1 Tab by mouth two (2) times daily as needed for Pain (severe back pain). Max Daily Amount: 2 Tabs. Class: Print Route: Oral  
  
We Performed the Following DRUG SCREEN 11 W/CONF, SERUM [[id number] Custom] Follow-up Instructions Return in about 4 weeks (around 7/30/2018). Introducing [facility]! Dalila Junior introduces GoPollGo patient portal. Now you can access parts of your medical record, email your doctor's office, and request medication refills online. 1. In your internet browser, go to [email or web address] 2. Click on the First Time User? Click Here link in the Sign In box. You will see the New Member Sign Up page. 3. Enter your GoPollGo Access Code exactly as it appears below. You will not need to use this code after youve completed the sign-up process. If you do not sign up before the expiration date, you must request a new code. · GoPollGo Access Code: [email or web address] Expires: 9/1/2018  9:07 PM 
 
4. Enter the last four digits of your Social Security Number (xxxx) and Date of Birth (mm/dd/yyyy) as indicated and click Submit. You will be taken to the next sign-up page. 5. Create a GoPollGo ID. This will be your GoPollGo login ID and cannot be changed, so think of one that is secure and easy to remember. 6. Create a [facility] password. You can change your password at any time. 7. Enter your Password Reset Question and Answer. This can be used at a later time if you forget your password. 8. Enter your e-mail address. You will receive e-mail notification when new information is available in [address] 9. Click Sign Up. You can now view and download portions of your medical record. 10. Click the Download Summary menu link to download a portable copy of your medical information. If you have questions, please visit the Frequently Asked Questions section of the [employer] website. Remember, [facility] is NOT to be used for urgent needs. For medical emergencies, dial 911. Now available from your iPhone and Android! Please provide this summary of care documentation to your next provider. Your primary care clinician is listed as Mireya Romeo. If you have any questions after today's visit, please call [phone].

## 2018-07-02 NOTE — PROGRESS NOTES
Interval HPI:     This is a 29 y.o. female who is following up for     PMHx: chronic back pain, hx of fibromyalgia, hx of DVT/ coumadin, hx of Lupus, mild lower lumbar disc degeneration (L4-5 and L5-S1 with small annular tear at L5-S1)    Chief Complaint   Patient presents with    Pain (Chronic)       Last seen in late January 2018. Since then has had multiple hospitalizations at [facility] (reviewed EMR). Was recently discharged from there 6-28/ 6-29. Underwent VATS/ Thoracic on 6-21 and had chest tube prior to that. Was doing Peritoneal dialysis but got infection and now doing Hemodialysis. Reviewed , has been receiving pain medications from Hospital providers since her last visit with me in 1-28-18. Continues to have moderate-severe low back pain but she says the rib pain (right upper back and right side of ribs where she had incisions) is what's causing the most pain. Rates it as 7-8/ 10. Got Rx of Hydromorphone 2 mg tablets (#14) when discharged from hospital on 6-28-18. Shows me the Rx (i.e not filled). Her mother manages her pain medications. Tried/ failed: Gabapentin, Cymbalta, Amitriptyline (only helped get to sleep), Lyrica (abnormal behaviors), Hydrocodone (\"too strong\")    Reviewed : last filled Oxycodone 5 mg (#40 tabs) by me on 2-4-18, then filled Rx by Neurology NP Joselo on 3-5-18 and 5-5-18.   Remainder of interval Rxs were from Hospital providers    UDS Hx:  March 2017: consistent with Rx pain medication  9-13-17 UDS: only screened for codeine or morphine, which pt doesn't take  1-3-18: serum Drug screen: consistent with Rx pain medication (oxycodone)      Brief ROS: as above      Past Medical History:   Diagnosis Date    Anemia     secondary to lupus    Asthma     no inhaler use in past 2 to 3 years    Carditis     Chronic kidney disease     ESRD    Chronic pain     DDD (degenerative disc disease), lumbar     [facility]     GERD (gastroesophageal reflux disease)     Heart failure ([facility] [facility]     Hemodialysis patient [facility]) 2017    [address]  Tuesday,  Thursday,  and Saturday. 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  Poor historian 2018    With medications    Thromboembolus ([facility] [address]     lungs    Transfusion history     Last Transfusion 2017  at [facility]       Past Surgical History:   Procedure Laterality Date    HX  SECTION  11/2006    x1    HX OTHER SURGICAL  9/16/15    INSERTION PD CATH; Removed 2017    HX VASCULAR ACCESS Right 2017    Double-Lumen henry catheter upper chest       Family History   Problem Relation Age of Onset    Diabetes Father     Hypertension Father     Cancer Other      aunt with breast cancer    Diabetes Mother        Social History     Social History    Marital status: SINGLE     Spouse name: N/A    Number of children: N/A    Years of education: N/A     Occupational History    Not on file. Social History Main Topics    Smoking status: Never Smoker    Smokeless tobacco: Never Used    Alcohol use No    Drug use: Yes     Special: Prescription, OTC    Sexual activity: Not Currently     Other Topics Concern     Service No    Blood Transfusions No    Caffeine Concern No    Occupational Exposure No    Hobby Hazards No    Sleep Concern No    Stress Concern No    Weight Concern No    Special Diet No    Back Care Yes     low back pain    Exercise No    Bike Helmet No    Seat Belt Yes    Self-Exams No     Social History Narrative    Lives with parents and daughter.        Allergies   Allergen Reactions    Compazine [Prochlorperazine Edisylate] Nausea and Vomiting and Palpitations     Current Outpatient Prescriptions   Medication Sig Dispense Refill    ELIQUIS 5 mg tablet       carvedilol (COREG) 6.25 mg tablet       HYDROmorphone (DILAUDID) 4 mg tablet   0    levoFLOXacin (LEVAQUIN) 500 mg tablet TK 1 T PO Q 48 H. TAKE AFTER HEMODIALYSIS  0    oxyCODONE-acetaminophen (PERCOCET) 5-325 mg per tablet Take 1 Tab by mouth two (2) times daily as needed for Pain (severe back pain). Max Daily Amount: 2 Tabs. 60 Tab 0    amLODIPine (NORVASC) 5 mg tablet Take 2 Tabs by mouth daily for 30 days. 30 Tab 0    apixaban (ELIQUIS) 2.5 mg tablet Take 1 Tab by mouth two (2) times a day for 30 days. 60 Tab 0    carvedilol (COREG) 25 mg tablet Take 1 Tab by mouth two (2) times daily (with meals) for 30 days. 60 Tab 0    predniSONE (DELTASONE) 5 mg tablet Take 2 Tabs by mouth daily. 30 Tab 0    losartan (COZAAR) 50 mg tablet Take 1 Tab by mouth daily for 30 days. 30 Tab 0    L. acidoph & paracasei- S therm- Bifido (AJIT-Q/RISAQUAD) 8 billion cell cap cap Take 1 Cap by mouth daily for 30 days. 30 Cap 0    cloNIDine HCl (CATAPRES) 0.1 mg tablet Take 1 Tab by mouth three (3) times daily for 30 days. 90 Tab 0    senna (SENNA) 8.6 mg tablet Take 1 Tab by mouth daily as needed for Constipation. for constipation      amitriptyline (ELAVIL) 25 mg tablet Take 25 mg by mouth nightly as needed for Sleep.  polyethylene glycol (MIRALAX) 17 gram packet Take 1 Packet by mouth daily. 30 Packet 0    fluticasone-vilanterol (BREO ELLIPTA) 200-25 mcg/dose inhaler Take 1 Puff by inhalation daily. Rinse mouth out after use 1 Inhaler 5    gemfibrozil (LOPID) 600 mg tablet Take 300 mg by mouth daily.  azaTHIOprine (IMURAN) 50 mg tablet Take 50 mg by mouth daily (after breakfast).       albuterol (PROVENTIL HFA, VENTOLIN HFA, PROAIR HFA) 90 mcg/actuation inhaler Take 1-2 Puffs by inhalation every four (4) hours as needed for Wheezing or Shortness of Breath. 1 Inhaler 2    hydroxychloroquine (PLAQUENIL) 200 mg tablet Take 200 mg by mouth two (2) times a day.  allopurinol (ZYLOPRIM) 100 mg tablet Take 100 mg by mouth daily (after breakfast). Needs to TAKE on a full stomach; will cause her to be nauseated.  cyanocobalamin (VITAMIN B-12) 2,500 mcg sublingual tablet Take 1 Tab by mouth daily. 90 Tab 1    pantoprazole (PROTONIX) 40 mg tablet Take 1 Tab by mouth Daily (before breakfast). 30 Tab 0       Physical Exam  Vitals:    07/02/18 1258   BP: (!) 138/94   Pulse: 78   SpO2: 93%   Weight: 69.9 kg (154 lb)   Height: 5' 5\" (1.651 m)       Awake, resting in wheelchair, cachectic and looks weak but talking, has dyspnea  Awake, alert and conversant   Ext: dialysis fistula in right forearm    MSK:  Lumbar spine:  + tender across lower back  + pain with mild forward flexion and standing upright      Focused Neurological Exam     Mental status:   Alert and oriented to person, place situation  Mood appears stable  Normal thought processes    CNs: EOMI, Face symmetric, Hearing/ Language normal  Sensory: intact light touch in both legs  Motor: 4/ 5 strength in arms and legs    Reflexes: not tested  Gait: antalgic    Impression    ICD-10-CM ICD-9-CM    1. Chronic bilateral low back pain without sciatica M54.5 724.2 DRUG SCREEN 11 W/CONF, SERUM    G89.29 338.29 oxyCODONE-acetaminophen (PERCOCET) 5-325 mg per tablet   2. Rib pain on right side R07.81 786.50 oxyCODONE-acetaminophen (PERCOCET) 5-325 mg per tablet   3.  Chronic, continuous use of opioids F11.90 305.51         Advsied pt NOT to fill the Rx of Hydromorphone 2 mg tablets  Advised her to give the Rx to her  Mother who can put in lockbox  Rx'd Percocet 5/325 one tab BID prn severe pain (#60, no RF)  Given Rx to get Serum Drug Screen checked along with other blood work at dialysis tomorrow  Follow up in 4 weeks       Signed By: Dick Smart MD     July 2, 2018

## 2018-07-02 NOTE — PROGRESS NOTES
Outbound call to patient today to completed Ed discharge assessment. Patient seen at [facility] Ed on 6/30/17 for for chest wall pain. Patient verified by 3 identifiers. Patient states she is still sore, but is feeling some better today. Has follow-up with PCP for today. Medication review completed. Patient states she was discharged this past Thursday after being treated for pneumonia and having a chest tube placed. Denies drainage at site. Patient admits to taking her Dilaudid as ordered, as needed. Discharge instruction and goals reviewed, patient verbalized understanding and adherence. Case management Plan:  NN will continue to attempt contacts with patient by telephone or during office visit within next 7-10 days. Will continue to follow as necessary for the remaining 30 days post visit and will reassess to see if CCM assessment is needed before discharge.

## 2018-07-02 NOTE — PROGRESS NOTES
Pill count = patient did not bring     Pill count verified with patient. UDS obtained and sent = saliva sample 3/5/18  Pain contract = on file   made available for MD review.

## 2018-07-03 NOTE — PROGRESS NOTES
Message from PCP:Requesting NN to make sure that patient schedules an appointment with cardiothoracic surgeon Silke Dennis MD this week? Outbound call to Dr. Coles Client office by this NN. Office had not heard from patient to schedule an appointment. NN requesting office to schedule appointment for patient, and requested that office  call the patient to confirm. NN informed by office that the office would call patient and confirm an appointment for 7/17/18, which was the first available. NN contact information given for questions and concerns.

## 2018-07-06 ENCOUNTER — HOSPITAL ENCOUNTER (OUTPATIENT)
Dept: GENERAL RADIOLOGY | Age: 33
Discharge: HOME | End: 2018-07-06
Payer: MEDICARE

## 2018-07-06 DIAGNOSIS — R76.11 POSITIVE PPD: ICD-10-CM

## 2018-07-06 PROCEDURE — 71046 X-RAY EXAM CHEST 2 VIEWS: CPT

## 2018-07-11 ENCOUNTER — PATIENT OUTREACH (OUTPATIENT)
Dept: FAMILY MEDICINE CLINIC | Age: 33
End: 2018-07-11

## 2018-07-11 NOTE — PROGRESS NOTES
Outbound call to patient listed on [facility] ED admit list for ED visit to [facility] for Pneumonia. Patient was verified by 3 identifiers. According to patient, she was admitted for Pneumonia and is hoping to get out of the hospital on tomorrow. She will be on vacation through Sunday. Patient declined to make a follow-up during this encounter because she is not sure when she will be released. Patient will call for a follow-up at discharge. All future appointments reviewed. No medication review done during this encounter.     Future Appointments      Provider Rocio Hardy   7/17/2018 9:00 AM Twyla Escobedo  [address] [facility] [place] [address] [address]   7/18/2018 2:00 PM Sabra Hernandez MD [facility] [place] [address] [address]   7/30/2018 3:00 PM Johanna Gonzalez MD [address] [place] [address]   12/21/2018 1:00 PM AZIZA [place] [facility] [address]   12/21/2018 2:00 PM Sabra Hernandez MD [facility]

## 2018-07-13 DIAGNOSIS — J90 LOCULATED PLEURAL EFFUSION: Primary | ICD-10-CM

## 2018-07-19 ENCOUNTER — HOSPITAL ENCOUNTER (INPATIENT)
Age: 33
LOS: 2 days | Discharge: HOME HEALTH | DRG: 304 | End: 2018-07-22
Attending: EMERGENCY MEDICINE | Admitting: FAMILY MEDICINE
Payer: MEDICARE

## 2018-07-19 ENCOUNTER — APPOINTMENT (OUTPATIENT)
Dept: GENERAL RADIOLOGY | Age: 33
DRG: 304 | End: 2018-07-19
Attending: PHYSICIAN ASSISTANT
Payer: MEDICARE

## 2018-07-19 ENCOUNTER — APPOINTMENT (OUTPATIENT)
Dept: CT IMAGING | Age: 33
DRG: 304 | End: 2018-07-19
Attending: EMERGENCY MEDICINE
Payer: MEDICARE

## 2018-07-19 DIAGNOSIS — R06.02 SOB (SHORTNESS OF BREATH): Primary | ICD-10-CM

## 2018-07-19 DIAGNOSIS — R51.9 NONINTRACTABLE HEADACHE, UNSPECIFIED CHRONICITY PATTERN, UNSPECIFIED HEADACHE TYPE: ICD-10-CM

## 2018-07-19 DIAGNOSIS — R77.8 ELEVATED TROPONIN: ICD-10-CM

## 2018-07-19 DIAGNOSIS — I10 HYPERTENSION, UNSPECIFIED TYPE: ICD-10-CM

## 2018-07-19 PROBLEM — I16.0 HYPERTENSIVE URGENCY: Status: ACTIVE | Noted: 2018-07-19

## 2018-07-19 LAB
ALBUMIN SERPL-MCNC: 2.8 G/DL (ref 3.5–5)
ALBUMIN/GLOB SERPL: 0.5 {RATIO} (ref 1.1–2.2)
ALP SERPL-CCNC: 185 U/L (ref 45–117)
ALT SERPL-CCNC: 18 U/L (ref 12–78)
ANION GAP SERPL CALC-SCNC: 7 MMOL/L (ref 5–15)
AST SERPL-CCNC: 35 U/L (ref 15–37)
BASOPHILS # BLD: 0 K/UL (ref 0–0.1)
BASOPHILS NFR BLD: 1 % (ref 0–1)
BILIRUB SERPL-MCNC: 0.6 MG/DL (ref 0.2–1)
BNP SERPL-MCNC: ABNORMAL PG/ML (ref 0–125)
BUN SERPL-MCNC: 21 MG/DL (ref 6–20)
BUN/CREAT SERPL: 5 (ref 12–20)
CALCIUM SERPL-MCNC: 9.2 MG/DL (ref 8.5–10.1)
CHLORIDE SERPL-SCNC: 98 MMOL/L (ref 97–108)
CO2 SERPL-SCNC: 31 MMOL/L (ref 21–32)
CREAT SERPL-MCNC: 4 MG/DL (ref 0.55–1.02)
DIFFERENTIAL METHOD BLD: ABNORMAL
EOSINOPHIL # BLD: 0 K/UL (ref 0–0.4)
EOSINOPHIL NFR BLD: 0 % (ref 0–7)
ERYTHROCYTE [DISTWIDTH] IN BLOOD BY AUTOMATED COUNT: 18.8 % (ref 11.5–14.5)
GLOBULIN SER CALC-MCNC: 6.1 G/DL (ref 2–4)
GLUCOSE SERPL-MCNC: 103 MG/DL (ref 65–100)
HCT VFR BLD AUTO: 35.1 % (ref 35–47)
HGB BLD-MCNC: 10.5 G/DL (ref 11.5–16)
IMM GRANULOCYTES # BLD: 0 K/UL (ref 0–0.04)
IMM GRANULOCYTES NFR BLD AUTO: 1 % (ref 0–0.5)
LYMPHOCYTES # BLD: 0.7 K/UL (ref 0.8–3.5)
LYMPHOCYTES NFR BLD: 16 % (ref 12–49)
MCH RBC QN AUTO: 28.1 PG (ref 26–34)
MCHC RBC AUTO-ENTMCNC: 29.9 G/DL (ref 30–36.5)
MCV RBC AUTO: 93.9 FL (ref 80–99)
MONOCYTES # BLD: 0.2 K/UL (ref 0–1)
MONOCYTES NFR BLD: 4 % (ref 5–13)
NEUTS SEG # BLD: 3.6 K/UL (ref 1.8–8)
NEUTS SEG NFR BLD: 78 % (ref 32–75)
NRBC # BLD: 0.03 K/UL (ref 0–0.01)
NRBC BLD-RTO: 0.7 PER 100 WBC
PLATELET # BLD AUTO: 326 K/UL (ref 150–400)
PLATELET COMMENTS,PCOM: ABNORMAL
PMV BLD AUTO: 10.8 FL (ref 8.9–12.9)
POTASSIUM SERPL-SCNC: 4.2 MMOL/L (ref 3.5–5.1)
PROT SERPL-MCNC: 8.9 G/DL (ref 6.4–8.2)
RBC # BLD AUTO: 3.74 M/UL (ref 3.8–5.2)
RBC MORPH BLD: ABNORMAL
SODIUM SERPL-SCNC: 136 MMOL/L (ref 136–145)
TROPONIN I SERPL-MCNC: 0.13 NG/ML
TROPONIN I SERPL-MCNC: 0.13 NG/ML
TSH SERPL DL<=0.05 MIU/L-ACNC: 3.51 UIU/ML (ref 0.36–3.74)
WBC # BLD AUTO: 4.5 K/UL (ref 3.6–11)

## 2018-07-19 PROCEDURE — 83880 ASSAY OF NATRIURETIC PEPTIDE: CPT | Performed by: PHYSICIAN ASSISTANT

## 2018-07-19 PROCEDURE — 96374 THER/PROPH/DIAG INJ IV PUSH: CPT

## 2018-07-19 PROCEDURE — 84443 ASSAY THYROID STIM HORMONE: CPT | Performed by: FAMILY MEDICINE

## 2018-07-19 PROCEDURE — 93005 ELECTROCARDIOGRAM TRACING: CPT

## 2018-07-19 PROCEDURE — 74011250636 HC RX REV CODE- 250/636: Performed by: EMERGENCY MEDICINE

## 2018-07-19 PROCEDURE — 36415 COLL VENOUS BLD VENIPUNCTURE: CPT | Performed by: FAMILY MEDICINE

## 2018-07-19 PROCEDURE — 87040 BLOOD CULTURE FOR BACTERIA: CPT | Performed by: FAMILY MEDICINE

## 2018-07-19 PROCEDURE — 70450 CT HEAD/BRAIN W/O DYE: CPT

## 2018-07-19 PROCEDURE — 74011250636 HC RX REV CODE- 250/636: Performed by: FAMILY MEDICINE

## 2018-07-19 PROCEDURE — 96375 TX/PRO/DX INJ NEW DRUG ADDON: CPT

## 2018-07-19 PROCEDURE — 74011250637 HC RX REV CODE- 250/637: Performed by: FAMILY MEDICINE

## 2018-07-19 PROCEDURE — 99284 EMERGENCY DEPT VISIT MOD MDM: CPT

## 2018-07-19 PROCEDURE — 74011000250 HC RX REV CODE- 250: Performed by: EMERGENCY MEDICINE

## 2018-07-19 PROCEDURE — 84484 ASSAY OF TROPONIN QUANT: CPT | Performed by: PHYSICIAN ASSISTANT

## 2018-07-19 PROCEDURE — 94761 N-INVAS EAR/PLS OXIMETRY MLT: CPT

## 2018-07-19 PROCEDURE — 80053 COMPREHEN METABOLIC PANEL: CPT | Performed by: PHYSICIAN ASSISTANT

## 2018-07-19 PROCEDURE — 85025 COMPLETE CBC W/AUTO DIFF WBC: CPT | Performed by: PHYSICIAN ASSISTANT

## 2018-07-19 PROCEDURE — 71046 X-RAY EXAM CHEST 2 VIEWS: CPT

## 2018-07-19 PROCEDURE — 71250 CT THORAX DX C-: CPT

## 2018-07-19 RX ORDER — PREDNISONE 5 MG/1
10 TABLET ORAL DAILY
Status: DISCONTINUED | OUTPATIENT
Start: 2018-07-20 | End: 2018-07-22 | Stop reason: HOSPADM

## 2018-07-19 RX ORDER — AZATHIOPRINE 50 MG/1
50 TABLET ORAL
Status: DISCONTINUED | OUTPATIENT
Start: 2018-07-20 | End: 2018-07-22 | Stop reason: HOSPADM

## 2018-07-19 RX ORDER — ALLOPURINOL 100 MG/1
100 TABLET ORAL
Status: DISCONTINUED | OUTPATIENT
Start: 2018-07-20 | End: 2018-07-22 | Stop reason: HOSPADM

## 2018-07-19 RX ORDER — MORPHINE SULFATE 1 MG/ML
1 INJECTION, SOLUTION EPIDURAL; INTRATHECAL; INTRAVENOUS
Status: DISCONTINUED | OUTPATIENT
Start: 2018-07-19 | End: 2018-07-22 | Stop reason: HOSPADM

## 2018-07-19 RX ORDER — CARVEDILOL 6.25 MG/1
6.25 TABLET ORAL 2 TIMES DAILY WITH MEALS
Status: DISCONTINUED | OUTPATIENT
Start: 2018-07-20 | End: 2018-07-20

## 2018-07-19 RX ORDER — GUAIFENESIN 100 MG/5ML
81 LIQUID (ML) ORAL DAILY
Status: DISCONTINUED | OUTPATIENT
Start: 2018-07-20 | End: 2018-07-19 | Stop reason: SDUPTHER

## 2018-07-19 RX ORDER — CLONIDINE HYDROCHLORIDE 0.1 MG/1
0.1 TABLET ORAL 3 TIMES DAILY
Status: DISCONTINUED | OUTPATIENT
Start: 2018-07-20 | End: 2018-07-22 | Stop reason: HOSPADM

## 2018-07-19 RX ORDER — IPRATROPIUM BROMIDE AND ALBUTEROL SULFATE 2.5; .5 MG/3ML; MG/3ML
3 SOLUTION RESPIRATORY (INHALATION)
Status: DISCONTINUED | OUTPATIENT
Start: 2018-07-20 | End: 2018-07-21

## 2018-07-19 RX ORDER — SODIUM CHLORIDE 0.9 % (FLUSH) 0.9 %
5-10 SYRINGE (ML) INJECTION AS NEEDED
Status: DISCONTINUED | OUTPATIENT
Start: 2018-07-19 | End: 2018-07-22 | Stop reason: HOSPADM

## 2018-07-19 RX ORDER — LOSARTAN POTASSIUM 50 MG/1
50 TABLET ORAL DAILY
Status: DISCONTINUED | OUTPATIENT
Start: 2018-07-20 | End: 2018-07-22 | Stop reason: HOSPADM

## 2018-07-19 RX ORDER — HYDROXYCHLOROQUINE SULFATE 200 MG/1
200 TABLET, FILM COATED ORAL 2 TIMES DAILY
Status: DISCONTINUED | OUTPATIENT
Start: 2018-07-20 | End: 2018-07-22 | Stop reason: HOSPADM

## 2018-07-19 RX ORDER — AMITRIPTYLINE HYDROCHLORIDE 50 MG/1
25 TABLET, FILM COATED ORAL
Status: DISCONTINUED | OUTPATIENT
Start: 2018-07-19 | End: 2018-07-22 | Stop reason: HOSPADM

## 2018-07-19 RX ORDER — HYDROMORPHONE HYDROCHLORIDE 2 MG/1
2 TABLET ORAL
Status: DISCONTINUED | OUTPATIENT
Start: 2018-07-19 | End: 2018-07-22 | Stop reason: HOSPADM

## 2018-07-19 RX ORDER — LABETALOL HYDROCHLORIDE 5 MG/ML
20 INJECTION, SOLUTION INTRAVENOUS
Status: COMPLETED | OUTPATIENT
Start: 2018-07-19 | End: 2018-07-19

## 2018-07-19 RX ORDER — FENTANYL CITRATE 50 UG/ML
75 INJECTION, SOLUTION INTRAMUSCULAR; INTRAVENOUS
Status: COMPLETED | OUTPATIENT
Start: 2018-07-19 | End: 2018-07-19

## 2018-07-19 RX ORDER — AMLODIPINE BESYLATE 5 MG/1
5 TABLET ORAL DAILY
Status: ON HOLD | COMMUNITY
End: 2018-07-22

## 2018-07-19 RX ORDER — GEMFIBROZIL 600 MG/1
300 TABLET, FILM COATED ORAL DAILY
Status: DISCONTINUED | OUTPATIENT
Start: 2018-07-20 | End: 2018-07-22 | Stop reason: HOSPADM

## 2018-07-19 RX ORDER — POLYETHYLENE GLYCOL 3350 17 G/17G
17 POWDER, FOR SOLUTION ORAL
COMMUNITY
End: 2019-05-18

## 2018-07-19 RX ORDER — ACETAMINOPHEN 325 MG/1
650 TABLET ORAL
Status: DISCONTINUED | OUTPATIENT
Start: 2018-07-19 | End: 2018-07-22 | Stop reason: HOSPADM

## 2018-07-19 RX ORDER — HYDRALAZINE HYDROCHLORIDE 20 MG/ML
10 INJECTION INTRAMUSCULAR; INTRAVENOUS
Status: DISCONTINUED | OUTPATIENT
Start: 2018-07-19 | End: 2018-07-22 | Stop reason: HOSPADM

## 2018-07-19 RX ORDER — PANTOPRAZOLE SODIUM 40 MG/1
40 TABLET, DELAYED RELEASE ORAL
Status: DISCONTINUED | OUTPATIENT
Start: 2018-07-20 | End: 2018-07-22 | Stop reason: HOSPADM

## 2018-07-19 RX ORDER — SODIUM CHLORIDE 0.9 % (FLUSH) 0.9 %
5-10 SYRINGE (ML) INJECTION EVERY 8 HOURS
Status: DISCONTINUED | OUTPATIENT
Start: 2018-07-19 | End: 2018-07-22 | Stop reason: HOSPADM

## 2018-07-19 RX ORDER — OXYCODONE AND ACETAMINOPHEN 5; 325 MG/1; MG/1
1 TABLET ORAL
Status: DISCONTINUED | OUTPATIENT
Start: 2018-07-19 | End: 2018-07-22 | Stop reason: HOSPADM

## 2018-07-19 RX ORDER — GUAIFENESIN 100 MG/5ML
81 LIQUID (ML) ORAL DAILY
Status: DISCONTINUED | OUTPATIENT
Start: 2018-07-20 | End: 2018-07-22 | Stop reason: HOSPADM

## 2018-07-19 RX ORDER — AMLODIPINE BESYLATE 5 MG/1
5 TABLET ORAL DAILY
Status: DISCONTINUED | OUTPATIENT
Start: 2018-07-20 | End: 2018-07-20

## 2018-07-19 RX ADMIN — Medication 1 MG: at 22:39

## 2018-07-19 RX ADMIN — HYDRALAZINE HYDROCHLORIDE 10 MG: 20 INJECTION INTRAMUSCULAR; INTRAVENOUS at 23:58

## 2018-07-19 RX ADMIN — LABETALOL HYDROCHLORIDE 20 MG: 5 INJECTION, SOLUTION INTRAVENOUS at 21:38

## 2018-07-19 RX ADMIN — FENTANYL CITRATE 75 MCG: 50 INJECTION, SOLUTION INTRAMUSCULAR; INTRAVENOUS at 20:09

## 2018-07-19 RX ADMIN — NITROGLYCERIN 0.5 INCH: 20 OINTMENT TOPICAL at 22:38

## 2018-07-19 NOTE — ED TRIAGE NOTES
Triage:  Pt to ED due to concerns over headache, body aches, and periods of SOB. Pt into triage via wheel chair, no visible cues of distress present.

## 2018-07-19 NOTE — IP AVS SNAPSHOT
[address] 
[phone] Patient: Tiera Riggs MRN: [id number] :1985 A check fahad indicates which time of day the medication should be taken. My Medications CHANGE how you take these medications Instructions Each Dose to Equal  
 Morning Noon Evening Bedtime  
 amLODIPine 10 mg tablet Commonly known as:  Johny Haven What changed:   
- medication strength 
- how much to take Your last dose was: Your next dose is: Take 1 Tab by mouth daily. 10 mg  
    
   
   
   
  
 carvedilol 12.5 mg tablet Commonly known as:  Hubert Peña What changed:   
- medication strength 
- how much to take 
- how to take this Your last dose was: Your next dose is: Take 1 Tab by mouth two (2) times daily (with meals). 12.5 mg  
    
   
   
   
  
  
CONTINUE taking these medications Instructions Each Dose to Equal  
 Morning Noon Evening Bedtime  
 albuterol 90 mcg/actuation inhaler Commonly known as:  PROVENTIL HFA, VENTOLIN HFA, PROAIR HFA Your last dose was: Your next dose is: Take 1-2 Puffs by inhalation every four (4) hours as needed for Wheezing or Shortness of Breath. 1-2 Puff  
    
   
   
   
  
 allopurinol 100 mg tablet Commonly known as:  Ardie Fleischer Your last dose was: Your next dose is: Take 100 mg by mouth daily (after breakfast). Needs to TAKE on a full stomach; will cause her to be nauseated. 100 mg  
    
   
   
   
  
 amitriptyline 25 mg tablet Commonly known as:  ELAVIL Your last dose was: Your next dose is: Take 25 mg by mouth nightly. 25 mg  
    
   
   
   
  
 apixaban 5 mg tablet Commonly known as:  Jasen Padilla Your last dose was: Your next dose is: Take 5 mg by mouth two (2) times a day.  Indications: pulmonary thromboembolism 5 mg  
    
   
   
   
  
 azaTHIOprine 50 mg tablet Commonly known as:  [employer] Your last dose was: Your next dose is: Take 50 mg by mouth daily (after breakfast). 50 mg  
    
   
   
   
  
 cloNIDine HCl 0.1 mg tablet Commonly known as:  CATAPRES Your last dose was: Your next dose is: Take 1 Tab by mouth three (3) times daily for 30 days. 0.1 mg  
    
   
   
   
  
 fluticasone-vilanterol 200-25 mcg/dose inhaler Commonly known as:  BREO ELLIPTA Your last dose was: Your next dose is: Take 1 Puff by inhalation daily. Rinse mouth out after use 1 Puff  
    
   
   
   
  
 gemfibrozil 600 mg tablet Commonly known as:  LOPID Your last dose was: Your next dose is: Take 300 mg by mouth daily. 300 mg  
    
   
   
   
  
 losartan 50 mg tablet Commonly known as:  COZAAR Your last dose was: Your next dose is: Take 1 Tab by mouth daily for 30 days. 50 mg  
    
   
   
   
  
 oxyCODONE-acetaminophen 5-325 mg per tablet Commonly known as:  PERCOCET Your last dose was: Your next dose is: Take 1 Tab by mouth two (2) times daily as needed for Pain (severe back pain). Max Daily Amount: 2 Tabs. 1 Tab  
    
   
   
   
  
 pantoprazole 40 mg tablet Commonly known as:  PROTONIX Your last dose was: Your next dose is: Take 1 Tab by mouth Daily (before breakfast). 40 mg  
    
   
   
   
  
 PLAQUENIL 200 mg tablet Generic drug:  hydroxychloroquine Your last dose was: Your next dose is: Take 200 mg by mouth two (2) times a day. 200 mg  
    
   
   
   
  
 polyethylene glycol 17 gram packet Commonly known as:  Leata Sans Your last dose was: Your next dose is: Take 17 g by mouth daily. 17 g predniSONE 5 mg tablet Commonly known as:  Jassi Boland Your last dose was: Your next dose is: Take 2 Tabs by mouth daily. 10 mg Senna 8.6 mg tablet Generic drug:  senna Your last dose was: Your next dose is: Take 1 Tab by mouth daily as needed for Constipation. for constipation 1 Tab Where to Get Your Medications Information on where to get these meds will be given to you by the nurse or doctor. ! Ask your nurse or doctor about these medications  
  amLODIPine 10 mg tablet  
 carvedilol 12.5 mg tablet

## 2018-07-19 NOTE — IP AVS SNAPSHOT
[address] [facility] [address] 
[phone] Patient: Nilam Dye MRN: [id number] :1985 About your hospitalization You were admitted on:  2018 You last received care in the:  [facility] 4 IMCU 2 You were discharged on:  2018 Why you were hospitalized Your primary diagnosis was:  Not on File Your diagnoses also included:  Hypertensive Urgency Follow-up Information Follow up With Details Comments Contact Info Katelyn Mcneil NP On 2018 Hospital f/u PCP appointment 18 @ 10:30 a.m. [address] 
[phone] Your Scheduled Appointments 2018  1:30 PM EDT  
POST OP with Gayathri Lowe  [address] [facility]) [address] [place] [address] [address]  
[phone] 2018 10:30 AM EDT TRANSITIONAL CARE MANAGEMENT with Katelyn Mcneil  [facility] ([facility]) [address]  
[phone] 2018  2:20 PM EDT  
ESTABLISHED PATIENT with Jono Eddy MD  
[facility] ([facility]) [address] [place] [address]  [address] [facility] 57  
[phone] 2018  3:00 PM EDT Follow Up with Jb Albert MD  
[place] [address] [facility] [address] [facility] 99 [id number] [phone] Discharge Orders None A check fahad indicates which time of day the medication should be taken. My Medications CHANGE how you take these medications Instructions Each Dose to Equal  
 Morning Noon Evening Bedtime  
 amLODIPine 10 mg tablet Commonly known as:  Patricia Brown What changed:   
- medication strength - how much to take Your last dose was: Your next dose is: Take 1 Tab by mouth daily. 10 mg  
    
   
   
   
  
 carvedilol 12.5 mg tablet Commonly known as:  Duane Krause What changed:   
- medication strength 
- how much to take 
- how to take this Your last dose was: Your next dose is: Take 1 Tab by mouth two (2) times daily (with meals). 12.5 mg  
    
   
   
   
  
  
CONTINUE taking these medications Instructions Each Dose to Equal  
 Morning Noon Evening Bedtime  
 albuterol 90 mcg/actuation inhaler Commonly known as:  PROVENTIL HFA, VENTOLIN HFA, PROAIR HFA Your last dose was: Your next dose is: Take 1-2 Puffs by inhalation every four (4) hours as needed for Wheezing or Shortness of Breath. 1-2 Puff  
    
   
   
   
  
 allopurinol 100 mg tablet Commonly known as:  Johnathan Hess Your last dose was: Your next dose is: Take 100 mg by mouth daily (after breakfast). Needs to TAKE on a full stomach; will cause her to be nauseated. 100 mg  
    
   
   
   
  
 amitriptyline 25 mg tablet Commonly known as:  ELAVIL Your last dose was: Your next dose is: Take 25 mg by mouth nightly. 25 mg  
    
   
   
   
  
 apixaban 5 mg tablet Commonly known as:  Sindy Sofia Your last dose was: Your next dose is: Take 5 mg by mouth two (2) times a day. Indications: pulmonary thromboembolism 5 mg  
    
   
   
   
  
 azaTHIOprine 50 mg tablet Commonly known as:  The Pepsi Your last dose was: Your next dose is: Take 50 mg by mouth daily (after breakfast). 50 mg  
    
   
   
   
  
 cloNIDine HCl 0.1 mg tablet Commonly known as:  CATAPRES Your last dose was: Your next dose is: Take 1 Tab by mouth three (3) times daily for 30 days.   
 0.1 mg  
    
   
   
   
  
 fluticasone-vilanterol 200-25 mcg/dose inhaler Commonly known as:  BREO ELLIPTA Your last dose was: Your next dose is: Take 1 Puff by inhalation daily. Rinse mouth out after use 1 Puff  
    
   
   
   
  
 gemfibrozil 600 mg tablet Commonly known as:  LOPID Your last dose was: Your next dose is: Take 300 mg by mouth daily. 300 mg  
    
   
   
   
  
 losartan 50 mg tablet Commonly known as:  COZAAR Your last dose was: Your next dose is: Take 1 Tab by mouth daily for 30 days. 50 mg  
    
   
   
   
  
 oxyCODONE-acetaminophen 5-325 mg per tablet Commonly known as:  PERCOCET Your last dose was: Your next dose is: Take 1 Tab by mouth two (2) times daily as needed for Pain (severe back pain). Max Daily Amount: 2 Tabs. 1 Tab  
    
   
   
   
  
 pantoprazole 40 mg tablet Commonly known as:  PROTONIX Your last dose was: Your next dose is: Take 1 Tab by mouth Daily (before breakfast). 40 mg  
    
   
   
   
  
 PLAQUENIL 200 mg tablet Generic drug:  hydroxychloroquine Your last dose was: Your next dose is: Take 200 mg by mouth two (2) times a day. 200 mg  
    
   
   
   
  
 polyethylene glycol 17 gram packet Commonly known as:  Reginia Crazier Your last dose was: Your next dose is: Take 17 g by mouth daily. 17 g  
    
   
   
   
  
 predniSONE 5 mg tablet Commonly known as:  Berle Pouch Your last dose was: Your next dose is: Take 2 Tabs by mouth daily. 10 mg Senna 8.6 mg tablet Generic drug:  senna Your last dose was: Your next dose is: Take 1 Tab by mouth daily as needed for Constipation. for constipation 1 Tab Where to Get Your Medications Information on where to get these meds will be given to you by the nurse or doctor. ! Ask your nurse or doctor about these medications  
  amLODIPine 10 mg tablet  
 carvedilol 12.5 mg tablet Opioid Education Prescription Opioids: What You Need to Know: 
 
 
ATTENDING PHYSICIAN: Jim Long MD 
DISCHARGING PROVIDER: Jim Long MD   
To contact this individual call [phone] and ask the  to page. If unavailable ask to be transferred the Adult Hospitalist Department. DISCHARGE DIAGNOSES · Hypertensive urgency CONSULTATIONS: IP CONSULT TO NEPHROLOGY PROCEDURES/SURGERIES: * No surgery found * PENDING TEST RESULTS:  
At the time of discharge the following test results are still pending: none FOLLOW UP APPOINTMENTS:  
Follow-up Information Follow up With Details Comments Contact Info Nader Sharma NP On 7/27/2018 Hospital f/u PCP appointment Friday, 7/27/18 @ 10:30 a.m. [address] [place] [address] 
[phone] ADDITIONAL CARE RECOMMENDATIONS:  
· Patient needs repeat imaging of the chest in 1-2 weeks to re-evaluate findings from CT chest done 7/19/18 (specifically, \"small nodular opacities in the lingula which were not present previously. These may be inflammatory. \") · Follow up for hemodialysis as scheduled DIET: as previous Oral Nutritional Supplements: Ensure Clear or Ensure Active Clear Three times daily ACTIVITY: Activity as tolerated WOUND CARE: none EQUIPMENT needed: none DISCHARGE MEDICATIONS: 
 See Medication Reconciliation Form · It is important that you take the medication exactly as they are prescribed. · Keep your medication in the bottles provided by the pharmacist and keep a list of the medication names, dosages, and times to be taken in your wallet. · Do not take other medications without consulting your doctor. NOTIFY YOUR PHYSICIAN FOR ANY OF THE FOLLOWING:  
Fever over 101 degrees for 24 hours. Chest pain, shortness of breath, fever, chills, nausea, vomiting, diarrhea, change in mentation, falling, weakness, bleeding. Severe pain or pain not relieved by medications. Or, any other signs or symptoms that you may have questions about. DISPOSITION: 
  Home With: SELF 
 OT  PT  HH  RN  
  
 SNF/Inpatient Rehab/LTAC Independent/assisted living Hospice Other: CDMP Checked:  
Yes x PROBLEM LIST Updated: 
Yes x Signed:  
Yash Otero MD 
7/22/2018 12:38 PM 
 
 
ACO Transitions of Care [facility] offers a voluntary care coordination program to provide high quality service and care to [facility] fee-for-service beneficiaries. Maura Scruggs was designed to help you enhance your health and well-being through the following services: ? Transitions of Care  support for individuals who are transitioning from one care setting to another (example: Hospital to home). ? Chronic and Complex Care Coordination  support for individuals and caregivers of those with serious or chronic illnesses or with more than one chronic (ongoing) condition and those who take a number of different medications. If you meet specific medical criteria, a [address] [place] [address] Rd may call you directly to coordinate your care with your primary care physician and your other care providers.  
 
For questions about the [facility] programs, please, contact your physicians office. For general questions or additional information about Accountable Care Organizations: 
Please visit www.medicare.gov/acos. html or call 1-800-MEDICARE (8-486.196.6063) TTY users should call 2-873.110.7286. WePopp Announcement We are excited to announce that we are making your provider's discharge notes available to you in WePopp. You will see these notes when they are completed and signed by the physician that discharged you from your recent hospital stay. If you have any questions or concerns about any information you see in WePopp, please call the Health Information Department where you were seen or reach out to your Primary Care Provider for more information about your plan of care. Introducing [facility]! Lety Deal introduces WePopp patient portal. Now you can access parts of your medical record, email your doctor's office, and request medication refills online. 1. In your internet browser, go to [email or web address] 2. Click on the First Time User? Click Here link in the Sign In box. You will see the New Member Sign Up page. 3. Enter your WePopp Access Code exactly as it appears below. You will not need to use this code after youve completed the sign-up process. If you do not sign up before the expiration date, you must request a new code. · WePopp Access Code: [email or web address] Expires: 9/1/2018  9:07 PM 
 
4. Enter the last four digits of your Social Security Number (xxxx) and Date of Birth (mm/dd/yyyy) as indicated and click Submit. You will be taken to the next sign-up page. 5. Create a WePopp ID. This will be your WePopp login ID and cannot be changed, so think of one that is secure and easy to remember. 6. Create a WePopp password. You can change your password at any time. 7. Enter your Password Reset Question and Answer. This can be used at a later time if you forget your password. 8. Enter your e-mail address. You will receive e-mail notification when new information is available in [address] 9. Click Sign Up. You can now view and download portions of your medical record. 10. Click the Download Summary menu link to download a portable copy of your medical information. If you have questions, please visit the Frequently Asked Questions section of the Lecorpio[employer] website. Remember, Toothpick is NOT to be used for urgent needs. For medical emergencies, dial 911. Now available from your iPhone and Android! Introducing Nito Major As a [employer] patient, I wanted to make you aware of our electronic visit tool called Nito Major. [employer] 24/7 allows you to connect within minutes with a medical provider 24 hours a day, seven days a week via a mobile device or tablet or logging into a secure website from your computer. You can access Nito Major from anywhere in the [place]. A virtual visit might be right for you when you have a simple condition and feel like you just dont want to get out of bed, or cant get away from work for an appointment, when your regular [employer] provider is not available (evenings, weekends or holidays), or when youre out of town and need minor care. Electronic visits cost only $49 and if the [employer] 24/7 provider determines a prescription is needed to treat your condition, one can be electronically transmitted to a nearby pharmacy*. Please take a moment to enroll today if you have not already done so. The enrollment process is free and takes just a few minutes. To enroll, please download the [employer] 24/7 judith to your tablet or phone, or visit [email or web address] to enroll on your computer.    
And, as an [address] [place] [address] patient with a Clinkle account, the results of your visits will be scanned into your electronic medical record and your primary care provider will be able to view the scanned results. We urge you to continue to see your regular Becki Omid provider for your ongoing medical care. And while your primary care provider may not be the one available when you seek a Find That File virtual visit, the peace of mind you get from getting a real diagnosis real time can be priceless. For more information on Find That File, view our Frequently Asked Questions (FAQs) at [email or web address]. Sincerely, 
 
Marva Evans MD 
Chief Medical Officer [address] *:  certain medications cannot be prescribed via Find That File Unresulted Labs-Please follow up with your PCP about these lab tests Order Current Status CULTURE, BLOOD, PAIRED Preliminary result Providers Seen During Your Hospitalization Provider Specialty Primary office phone Rajni Demarco MD Emergency Medicine [phone] Adryan Eugene MD Hospitalist [phone] Sabra Mcarthur MD Family Practice [phone] Meri Wagner MD Internal Medicine [phone] Your Primary Care Physician (PCP) Primary Care Physician Office Phone Office Fax Serena Dawkins [phone] You are allergic to the following Allergen Reactions Compazine (Prochlorperazine Edisylate) Nausea and Vomiting Palpitations Recent Documentation Height Weight Breastfeeding? BMI OB Status Smoking Status 1.651 m 61.5 kg No 22.55 kg/m2 Medically Induced Never Smoker Emergency Contacts Name Discharge Info Relation Home [facility] CAREGIVER [3] Mother [14] [phone] Stew Villafanakatey CAREGIVER [3] Father [15] [phone] Justice  Parent [1] [phone] Patient Belongings  The following personal items are in your possession at time of discharge: 
  Dental Appliances: None  Visual Aid: Glasses      Home Medications: None Anderson: None  Clothing: At bedside    Other Valuables: Cell Phone Please provide this summary of care documentation to your next provider. Signatures-by signing, you are acknowledging that this After Visit Summary has been reviewed with you and you have received a copy. Patient Signature:  ____________________________________________________________ Date:  ____________________________________________________________  
  
Chester Sport Provider Signature:  ____________________________________________________________ Date:  ____________________________________________________________

## 2018-07-20 LAB
ALBUMIN SERPL-MCNC: 2.4 G/DL (ref 3.5–5)
ALBUMIN/GLOB SERPL: 0.5 {RATIO} (ref 1.1–2.2)
ALP SERPL-CCNC: 147 U/L (ref 45–117)
ALT SERPL-CCNC: 14 U/L (ref 12–78)
ANION GAP SERPL CALC-SCNC: 9 MMOL/L (ref 5–15)
AST SERPL-CCNC: 25 U/L (ref 15–37)
ATRIAL RATE: 106 BPM
ATRIAL RATE: 93 BPM
BASOPHILS # BLD: 0 K/UL (ref 0–0.1)
BASOPHILS NFR BLD: 1 % (ref 0–1)
BILIRUB SERPL-MCNC: 0.5 MG/DL (ref 0.2–1)
BUN SERPL-MCNC: 26 MG/DL (ref 6–20)
BUN/CREAT SERPL: 6 (ref 12–20)
CALCIUM SERPL-MCNC: 8.7 MG/DL (ref 8.5–10.1)
CALCULATED P AXIS, ECG09: 22 DEGREES
CALCULATED P AXIS, ECG09: 40 DEGREES
CALCULATED R AXIS, ECG10: 22 DEGREES
CALCULATED R AXIS, ECG10: 38 DEGREES
CALCULATED T AXIS, ECG11: -13 DEGREES
CALCULATED T AXIS, ECG11: -31 DEGREES
CHLORIDE SERPL-SCNC: 98 MMOL/L (ref 97–108)
CO2 SERPL-SCNC: 30 MMOL/L (ref 21–32)
CREAT SERPL-MCNC: 4.44 MG/DL (ref 0.55–1.02)
DIAGNOSIS, 93000: NORMAL
DIAGNOSIS, 93000: NORMAL
DIFFERENTIAL METHOD BLD: ABNORMAL
EOSINOPHIL # BLD: 0 K/UL (ref 0–0.4)
EOSINOPHIL NFR BLD: 1 % (ref 0–7)
ERYTHROCYTE [DISTWIDTH] IN BLOOD BY AUTOMATED COUNT: 18.4 % (ref 11.5–14.5)
GLOBULIN SER CALC-MCNC: 5 G/DL (ref 2–4)
GLUCOSE BLD STRIP.AUTO-MCNC: 192 MG/DL (ref 65–100)
GLUCOSE SERPL-MCNC: 99 MG/DL (ref 65–100)
HCT VFR BLD AUTO: 29.8 % (ref 35–47)
HGB BLD-MCNC: 9 G/DL (ref 11.5–16)
IMM GRANULOCYTES # BLD: 0 K/UL (ref 0–0.04)
IMM GRANULOCYTES NFR BLD AUTO: 1 % (ref 0–0.5)
LYMPHOCYTES # BLD: 0.7 K/UL (ref 0.8–3.5)
LYMPHOCYTES NFR BLD: 20 % (ref 12–49)
MCH RBC QN AUTO: 28.3 PG (ref 26–34)
MCHC RBC AUTO-ENTMCNC: 30.2 G/DL (ref 30–36.5)
MCV RBC AUTO: 93.7 FL (ref 80–99)
MONOCYTES # BLD: 0.2 K/UL (ref 0–1)
MONOCYTES NFR BLD: 5 % (ref 5–13)
NEUTS SEG # BLD: 2.6 K/UL (ref 1.8–8)
NEUTS SEG NFR BLD: 72 % (ref 32–75)
NRBC # BLD: 0.04 K/UL (ref 0–0.01)
NRBC BLD-RTO: 1.1 PER 100 WBC
P-R INTERVAL, ECG05: 130 MS
P-R INTERVAL, ECG05: 146 MS
PLATELET # BLD AUTO: 248 K/UL (ref 150–400)
PMV BLD AUTO: 10.2 FL (ref 8.9–12.9)
POTASSIUM SERPL-SCNC: 3.9 MMOL/L (ref 3.5–5.1)
PROT SERPL-MCNC: 7.4 G/DL (ref 6.4–8.2)
Q-T INTERVAL, ECG07: 376 MS
Q-T INTERVAL, ECG07: 402 MS
QRS DURATION, ECG06: 76 MS
QRS DURATION, ECG06: 82 MS
QTC CALCULATION (BEZET), ECG08: 499 MS
QTC CALCULATION (BEZET), ECG08: 499 MS
RBC # BLD AUTO: 3.18 M/UL (ref 3.8–5.2)
RBC MORPH BLD: ABNORMAL
SERVICE CMNT-IMP: ABNORMAL
SODIUM SERPL-SCNC: 137 MMOL/L (ref 136–145)
TROPONIN I SERPL-MCNC: 0.12 NG/ML
VENTRICULAR RATE, ECG03: 106 BPM
VENTRICULAR RATE, ECG03: 93 BPM
WBC # BLD AUTO: 3.5 K/UL (ref 3.6–11)

## 2018-07-20 PROCEDURE — 65660000000 HC RM CCU STEPDOWN

## 2018-07-20 PROCEDURE — 74011250637 HC RX REV CODE- 250/637: Performed by: FAMILY MEDICINE

## 2018-07-20 PROCEDURE — 74011000250 HC RX REV CODE- 250: Performed by: FAMILY MEDICINE

## 2018-07-20 PROCEDURE — 74011636637 HC RX REV CODE- 636/637: Performed by: FAMILY MEDICINE

## 2018-07-20 PROCEDURE — 93306 TTE W/DOPPLER COMPLETE: CPT

## 2018-07-20 PROCEDURE — 84484 ASSAY OF TROPONIN QUANT: CPT | Performed by: FAMILY MEDICINE

## 2018-07-20 PROCEDURE — 36415 COLL VENOUS BLD VENIPUNCTURE: CPT | Performed by: FAMILY MEDICINE

## 2018-07-20 PROCEDURE — 99218 HC RM OBSERVATION: CPT

## 2018-07-20 PROCEDURE — 94760 N-INVAS EAR/PLS OXIMETRY 1: CPT

## 2018-07-20 PROCEDURE — 74011250636 HC RX REV CODE- 250/636: Performed by: FAMILY MEDICINE

## 2018-07-20 PROCEDURE — 85025 COMPLETE CBC W/AUTO DIFF WBC: CPT | Performed by: FAMILY MEDICINE

## 2018-07-20 PROCEDURE — 94640 AIRWAY INHALATION TREATMENT: CPT

## 2018-07-20 PROCEDURE — 74011250637 HC RX REV CODE- 250/637: Performed by: INTERNAL MEDICINE

## 2018-07-20 PROCEDURE — 77030029684 HC NEB SM VOL KT MONA -A

## 2018-07-20 PROCEDURE — 80053 COMPREHEN METABOLIC PANEL: CPT | Performed by: FAMILY MEDICINE

## 2018-07-20 PROCEDURE — 82962 GLUCOSE BLOOD TEST: CPT

## 2018-07-20 PROCEDURE — A9270 NON-COVERED ITEM OR SERVICE: HCPCS | Performed by: FAMILY MEDICINE

## 2018-07-20 PROCEDURE — 74011250637 HC RX REV CODE- 250/637: Performed by: NURSE PRACTITIONER

## 2018-07-20 RX ORDER — CARVEDILOL 12.5 MG/1
12.5 TABLET ORAL 2 TIMES DAILY WITH MEALS
Status: DISCONTINUED | OUTPATIENT
Start: 2018-07-20 | End: 2018-07-22 | Stop reason: HOSPADM

## 2018-07-20 RX ORDER — AMLODIPINE BESYLATE 5 MG/1
10 TABLET ORAL DAILY
Status: DISCONTINUED | OUTPATIENT
Start: 2018-07-21 | End: 2018-07-22 | Stop reason: HOSPADM

## 2018-07-20 RX ORDER — CLONIDINE HYDROCHLORIDE 0.2 MG/1
0.2 TABLET ORAL
Status: COMPLETED | OUTPATIENT
Start: 2018-07-20 | End: 2018-07-20

## 2018-07-20 RX ORDER — BUDESONIDE 0.5 MG/2ML
500 INHALANT ORAL
Status: DISCONTINUED | OUTPATIENT
Start: 2018-07-20 | End: 2018-07-22 | Stop reason: HOSPADM

## 2018-07-20 RX ORDER — AMLODIPINE BESYLATE 5 MG/1
5 TABLET ORAL ONCE
Status: COMPLETED | OUTPATIENT
Start: 2018-07-20 | End: 2018-07-20

## 2018-07-20 RX ORDER — ARFORMOTEROL TARTRATE 15 UG/2ML
15 SOLUTION RESPIRATORY (INHALATION)
Status: DISCONTINUED | OUTPATIENT
Start: 2018-07-20 | End: 2018-07-22 | Stop reason: HOSPADM

## 2018-07-20 RX ADMIN — CLONIDINE HYDROCHLORIDE 0.1 MG: 0.1 TABLET ORAL at 00:21

## 2018-07-20 RX ADMIN — IPRATROPIUM BROMIDE AND ALBUTEROL SULFATE 3 ML: .5; 3 SOLUTION RESPIRATORY (INHALATION) at 21:00

## 2018-07-20 RX ADMIN — Medication 1 MG: at 07:30

## 2018-07-20 RX ADMIN — CLONIDINE HYDROCHLORIDE 0.1 MG: 0.1 TABLET ORAL at 21:50

## 2018-07-20 RX ADMIN — IPRATROPIUM BROMIDE AND ALBUTEROL SULFATE 3 ML: .5; 3 SOLUTION RESPIRATORY (INHALATION) at 05:02

## 2018-07-20 RX ADMIN — IPRATROPIUM BROMIDE AND ALBUTEROL SULFATE 3 ML: .5; 3 SOLUTION RESPIRATORY (INHALATION) at 11:56

## 2018-07-20 RX ADMIN — HYDROXYCHLOROQUINE SULFATE 200 MG: 200 TABLET, FILM COATED ORAL at 08:56

## 2018-07-20 RX ADMIN — APIXABAN 5 MG: 5 TABLET, FILM COATED ORAL at 08:55

## 2018-07-20 RX ADMIN — CLONIDINE HYDROCHLORIDE 0.1 MG: 0.1 TABLET ORAL at 17:24

## 2018-07-20 RX ADMIN — ALLOPURINOL 100 MG: 100 TABLET ORAL at 08:56

## 2018-07-20 RX ADMIN — HYDROXYCHLOROQUINE SULFATE 200 MG: 200 TABLET, FILM COATED ORAL at 17:25

## 2018-07-20 RX ADMIN — Medication 1 MG: at 12:54

## 2018-07-20 RX ADMIN — AMLODIPINE BESYLATE 5 MG: 5 TABLET ORAL at 17:28

## 2018-07-20 RX ADMIN — GEMFIBROZIL 300 MG: 600 TABLET ORAL at 08:55

## 2018-07-20 RX ADMIN — Medication 10 ML: at 06:40

## 2018-07-20 RX ADMIN — ASPIRIN 81 MG 81 MG: 81 TABLET ORAL at 08:55

## 2018-07-20 RX ADMIN — AMLODIPINE BESYLATE 5 MG: 5 TABLET ORAL at 08:55

## 2018-07-20 RX ADMIN — APIXABAN 5 MG: 5 TABLET, FILM COATED ORAL at 17:26

## 2018-07-20 RX ADMIN — CLONIDINE HYDROCHLORIDE 0.2 MG: 0.2 TABLET ORAL at 01:39

## 2018-07-20 RX ADMIN — Medication 10 ML: at 21:50

## 2018-07-20 RX ADMIN — PANTOPRAZOLE SODIUM 40 MG: 40 TABLET, DELAYED RELEASE ORAL at 06:39

## 2018-07-20 RX ADMIN — BUDESONIDE 500 MCG: 0.5 INHALANT RESPIRATORY (INHALATION) at 21:00

## 2018-07-20 RX ADMIN — Medication 1 MG: at 03:10

## 2018-07-20 RX ADMIN — CARVEDILOL 6.25 MG: 6.25 TABLET, FILM COATED ORAL at 08:56

## 2018-07-20 RX ADMIN — IPRATROPIUM BROMIDE AND ALBUTEROL SULFATE 3 ML: .5; 3 SOLUTION RESPIRATORY (INHALATION) at 00:22

## 2018-07-20 RX ADMIN — PREDNISONE 10 MG: 5 TABLET ORAL at 08:55

## 2018-07-20 RX ADMIN — Medication 10 ML: at 17:27

## 2018-07-20 RX ADMIN — HYDROMORPHONE HYDROCHLORIDE 2 MG: 2 TABLET ORAL at 00:21

## 2018-07-20 RX ADMIN — AZATHIOPRINE 50 MG: 50 TABLET ORAL at 08:59

## 2018-07-20 RX ADMIN — OXYCODONE HYDROCHLORIDE AND ACETAMINOPHEN 1 TABLET: 5; 325 TABLET ORAL at 01:41

## 2018-07-20 RX ADMIN — BUDESONIDE 500 MCG: 0.5 INHALANT RESPIRATORY (INHALATION) at 07:10

## 2018-07-20 RX ADMIN — HYDROMORPHONE HYDROCHLORIDE 2 MG: 2 TABLET ORAL at 17:25

## 2018-07-20 RX ADMIN — CLONIDINE HYDROCHLORIDE 0.1 MG: 0.1 TABLET ORAL at 08:56

## 2018-07-20 RX ADMIN — CARVEDILOL 12.5 MG: 12.5 TABLET, FILM COATED ORAL at 17:26

## 2018-07-20 RX ADMIN — Medication 1 MG: at 20:12

## 2018-07-20 RX ADMIN — LOSARTAN POTASSIUM 50 MG: 50 TABLET ORAL at 08:56

## 2018-07-20 RX ADMIN — IPRATROPIUM BROMIDE AND ALBUTEROL SULFATE 3 ML: .5; 3 SOLUTION RESPIRATORY (INHALATION) at 15:10

## 2018-07-20 RX ADMIN — IPRATROPIUM BROMIDE AND ALBUTEROL SULFATE 3 ML: .5; 3 SOLUTION RESPIRATORY (INHALATION) at 07:10

## 2018-07-20 RX ADMIN — IPRATROPIUM BROMIDE AND ALBUTEROL SULFATE 3 ML: .5; 3 SOLUTION RESPIRATORY (INHALATION) at 23:34

## 2018-07-20 NOTE — ED NOTES
TRANSFER - OUT REPORT:    Verbal report given to nanette rn (name) on Nilam Hardik  being transferred to [facility] (unit) for routine progression of care       Report consisted of patients Situation, Background, Assessment and   Recommendations(SBAR). Information from the following report(s) SBAR and Recent Results was reviewed with the receiving nurse. Lines:   Peripheral IV 07/19/18 Left Wrist (Active)   Site Assessment Clean, dry, & intact 7/19/2018  8:05 PM   Phlebitis Assessment 0 7/19/2018  8:05 PM   Infiltration Assessment 0 7/19/2018  8:05 PM   Dressing Status Clean, dry, & intact 7/19/2018  8:05 PM   Dressing Type Transparent 7/19/2018  8:05 PM   Hub Color/Line Status Pink 7/19/2018  8:05 PM        Opportunity for questions and clarification was provided.       Patient transported with:   General Sentiment

## 2018-07-20 NOTE — H&P
[address]   HISTORY AND PHYSICAL      Rory Hemphill.  MR#: [id number]  : 1985  ACCOUNT #: [de-identified]   ADMIT DATE: 2018    CHIEF COMPLAINT:  Shortness of breath. HISTORY OF PRESENT ILLNESS:  The patient is a 81-year-old female with past medical history of end-stage renal disease on hemodialysis Tuesday, Thursday and Saturday, history of lupus, asthma, thromboembolism, lower extremity DVT, dyslipidemia who presents to the hospital with the above-mentioned symptoms. The patient was recently admit Ped and discharged on 2018. The patient was found to have healthcare-associated pneumonia with partially loculated pleural effusion. Patient underwent a chest tube placement and eventually underwent VATS on 2018. The patient had a chest tube removed and was discharged home. The patient also had some history of abdominal cavity abscess. Patient had a pigtail catheter. Eventually that catheter was removed and the patient was to follow up with general surgery. The patient reports that since then, her shortness of breath has gotten better, but not completely resolved. The patient reports she went to dialysis today, was found to have elevated blood pressure in \"243 systolic. \" The patient was sent to the hospital.  Patient reports that she was given a dose of clonidine in the ER. The patient was given dose of IV labetalol, continued to be hypertensive and was requested to be admitted under the hospitalist service. The patient reports that she had no chest pain associated with her symptoms. Denies any nausea, vomiting, diaphoresis. Reports that it is more exertional.  Denies any cough, denies any fever or chills associated with her symptoms. The patient reports that she has a diffuse headache that has been going on for the past few days and \"needs some strong medication to make that go away. \"  The patient denies any other complaints or problems.   Denies any blurry vision, sore throat, trouble swallowing, trouble with speech, any chest pain, cough, fever, chills, urinary symptoms, abdominal pain, constipation, diarrhea, focal or generalized neurological weakness, recent travel, sick contacts, any falls, injuries, hematemesis, melena, hemoptysis or any other concerns or problems. PAST MEDICAL HISTORY:  See above. MEDICATIONS:  Currently, the patient is on:  Pantoprazole 40 mg every day, cyanocobalamin,  allopurinol 100 mg daily, Plaquenil 200 mg b.i.d., Imuran 50 mg daily, albuterol p.r.n., amitriptyline 25 mg daily, Senna, clonidine 0.1 mg t.i.d., losartan 50 mg daily, prednisone 10 mg every day, Coreg 25 mg b.i.d., Eliquis 2.5 mg b.i.d., amlodipine 5 mg daily, Percocet 5/325 mg every 12 hours as needed for pain. SOCIAL HISTORY:  Denies tobacco abuse, alcohol use, IV drug abuse. Lives at home. ALLERGIES:  COMPAZINE. FAMILY HISTORY:  Discussed. Father with history of diabetes, hypertension and aunt had history of breast cancer. REVIEW OF SYSTEMS:  All systems reviewed, found to be essentially negative except for the symptoms mentioned above. PHYSICAL EXAMINATION:  VITAL SIGNS:  Temperature 98.5, pulse 72, respiration rate 34, blood pressure 170/80 and pulse ox 97% on room air. GENERAL:  Alert and oriented x3, awake, mildly distressed, pleasant female, appears to be stated age. HEENT:  Pupils equal, reactive to light. Dry mucous membranes. Tympanic membranes clear. NECK:  Supple. LUNGS:  Decreased basal breath sounds. HEART:  S1, S2 heard. ABDOMEN:  Soft, nontender, nondistended. Bowel sounds are physiological.  EXTREMITIES:  No clubbing, no cyanosis, no edema. NEUROPSYCHIATRIC:  Pleasant mood and affect. Cranial nerves II-XII are grossly intact. Sensory grossly within normal limits. DTR 2+/4. Strength 5/5. SKIN:  Warm. LABORATORY DATA:  White count 4.5, hemoglobin 10.5, hematocrit 35.1, platelets 927.   Sodium 136, potassium 4.2, chloride 98, bicarbonate 31, anion gap 10, glucose 102, BUN 21, creatinine 4, calcium 9.2, bilirubin total 0.6, ALT 18, AST 35, alkaline phosphatase 185. Troponin 0.13. BNP greater than 30,000. CT of the chest shows right hemithorax in the intrapleural area is no longer identified. There continues to be loculated pleural density in the right hemithorax  the previous examination. Otherwise, there are small nodular opacities in the lingula which were not present previously. This may be inflammatory   no significant changes since previous exam.  CT of the head shows no acute abnormality. EKG shows sinus tachycardia with nonspecific ST changes. ASSESSMENT AND PLAN:  1. Hypertensive urgency. Patient will be observed on a telemetry bed. We will place nitro paste and start patient on hydralazine p.r.n., neurovascular checks and close monitoring. Troponins, telemetry monitoring and cardiology consult has been requested. Further intervention will be per hospital course. Reassess as needed. Continue to closely monitor. Continue home medications and provide pain control. 2.  Headache, unclear whether this is the reason for or a consequence of elevated blood pressure. We will provide pain control. We will optimize blood pressure control and continue to closely monitor. CT of the head does not show any acute pathology. May consider further intervention and diagnostics if symptoms persist.  Neurovascular checks have been ordered. 3.  Shortness of breath, unclear etiology. Patient's CT does not show any acute changes. Does not appear to be infected as the patient has no fever, no white count and does not appear to have any obvious signs of infection. Denies any cough. The CT does show some pleural based density and the patient has a previous history of loculated effusion. We are concerned that this may be angina equivalent. We will observe the patient on telemetry bed.   Get troponins x3 sets as first set was slightly elevated. We will continue the patient on Eliquis. We will optimize blood pressure control. We will start patient on aspirin. Cardiology consult has been requested. Echocardiogram has been requested. I will provide nitroglycerin and monitor the patient on a telemetry bed. May consider getting a CT surgery consult or interventional radiology consult in the morning if symptoms persist.  We will provide DuoNebs p.r.n. and will reassess as needed. Continue to closely monitor. No emergent need for antibiotics. Blood cultures were drawn in the ER. Blood cultures will be drawn and we will continue to monitor. 4.  History of lupus. Continue home medications and continue to monitor. Further intervention will be per hospital course. Reassess as needed. 5.  History of end-stage renal disease. Nephrology consult has been requested. Monitor strict I's and O's. Continue home medications. 6.  Dyslipidemia. Continue home medication. 7.  History of chronic DVT. Continue Eliquis. 8.  GI and DVT prophylaxis. Patient is on Eliquis.       Amara Saucedo MD MM/TORI  D: 07/19/2018 22:16     T: 07/20/2018 00:12  JOB #: [id number]

## 2018-07-20 NOTE — PHYSICIAN ADVISORY
Letter of Status Determination:   Recommend hospitalization status upgraded from   OBSERVATION  to INPATIENT  Status     Pt Name:  Stephanie Kim   MR#   [facility] # [id number] /  [id number]   [facility]#  [id number]   [address] [place] [address]  423/01  @ [address]   Hospitalization date  7/19/2018  6:58 PM   Current Attending Physician  Emilio Sue MD   Principal diagnosis  <principal problem not specified>   Hypertensive urgency    Clinicals  29 y.o. y.o  female hospitalized with above diagnosis   The pt suffers from multiple chronic medical conditions including but not limited to ESRD/HD, HTN, Lupus etc.     She presented with marginally elevated troponin and elevated Blood pressure. [place] (MCG) criteria   Does  NOT apply    STATUS DETERMINATION  This patient is at high risk of adverse events and deterioration based on documented clinical data, comorbid conditions and current acute care course. Ms. Stephanie Kim is expected to meet Inpatient Admission status criteria in accordance with CMS regulation Section 43 .3. Specifically, due to medical necessity the patient's stay is expected to exceed Two Midnights. It is our recommendation that this patient's hospitalization status should be upgraded from  OBSERVATION to INPATIENT status. The final decision of the patient's hospitalization status depends on the attending physician's judgment. Additional comments     Payor: Vika Gonsales / Plan: [address] / Product Type: Medicare /         Lacey Luevano MD MPH FACP     Physician Advisor    [address]   President Medical Staff, [address] [place] [address]    Cell  [phone]        [phone]    .

## 2018-07-20 NOTE — ED PROVIDER NOTES
HPI       34y F with hx of lupus, recent pneumonia, ESRD on HD T// here with shortness of breath, elevated BP, and diffuse HA. Sx's ongoing for several days. Went to HD today and things went fine. BP was elevated and given an extra dose of clonidine but this didn't do much for the BP. No fever. No vomiting. Has been having breathing problems ever since she was dx'ed with pneumonia several weeks ago. No chest pain. No abdominal pain. No vomiting. No diarrhea. No rash. No neck pain or stiffness. Nothing makes sx's better or worse. Past Medical History:   Diagnosis Date    Anemia     secondary to lupus    Asthma     no inhaler use in past 2 to 3 years    Carditis     Chronic kidney disease     ESRD    Chronic pain     DDD (degenerative disc disease), lumbar     ESRD (end stage renal disease) (HCC)     GERD (gastroesophageal reflux disease)     Heart failure ([address]     Hemod[facility] ([facility] [address] 2017    [address]  Tuesday,  Thursday,  and Saturday. 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  Poor historian 2018    With medications    Thromboembolus ([address] 2013    lungs    Transfusion history     Last Transfusion 2017  at [facility]       Past Surgical History:   Procedure Laterality Date    HX  SECTION  11/2006    x1    HX OTHER SURGICAL  9/16/15    INSERTION PD CATH;  Removed 2017    HX VASCULAR ACCESS Right 2017    Double-Lumen henry catheter upper chest         Family History:   Problem Relation Age of Onset    Diabetes Father     Hypertension Father     Cancer Other      aunt with breast cancer    Diabetes Mother Social History     Social History    Marital status: SINGLE     Spouse name: N/A    Number of children: N/A    Years of education: N/A     Occupational History    Not on file. Social History Main Topics    Smoking status: Never Smoker    Smokeless tobacco: Never Used    Alcohol use No    Drug use: Yes     Special: Prescription, OTC    Sexual activity: Not Currently     Other Topics Concern     Service No    Blood Transfusions No    Caffeine Concern No    Occupational Exposure No    Hobby Hazards No    Sleep Concern No    Stress Concern No    Weight Concern No    Special Diet No    Back Care Yes     low back pain    Exercise No    Bike Helmet No    Seat Belt Yes    Self-Exams No     Social History Narrative    Lives with parents and daughter. ALLERGIES: Compazine [prochlorperazine edisylate]    Review of Systems   Review of Systems   Constitutional: (-) weight loss. HEENT: (-) stiff neck   Eyes: (-) discharge. Respiratory: (-) for cough. Cardiovascular: (-) syncope. Gastrointestinal: (-) blood in stool. Genitourinary: (-) hematuria. Musculoskeletal: (-) myalgias. Neurological: (-) seizure. Skin: (-) petechiae  Lymph/Immunologic: (-) enlarged lymph nodes  All other systems reviewed and are negative. Vitals:    07/19/18 1842   BP: (!) 192/130   Pulse: (!) 104   Temp: 98.5 °F (36.9 °C)   SpO2: 97%   Weight: 68.9 kg (152 lb)   Height: 5' 5\" (1.651 m)            Physical Exam Nursing note and vitals reviewed. Constitutional: oriented to person, place, and time. appears chronically ill. No distress. Head: Normocephalic and atraumatic. Sclera anicteric  Nose: No rhinorrhea  Mouth/Throat: Oropharynx is clear and moist. Pharynx normal  Eyes: Conjunctivae are normal. Pupils are equal, round, and reactive to light. Right eye exhibits no discharge. Left eye exhibits no discharge. Neck: Painless normal range of motion. Neck supple. No LAD.   Cardiovascular: Normal rate, regular rhythm, normal heart sounds and intact distal pulses. Exam reveals no gallop and no friction rub. No murmur heard. Pulmonary/Chest:  No respiratory distress. No wheezes. No rales. No rhonchi. No increased work of breathing. No accessory muscle use. Good air exchange throughout. Abdominal: soft, non-tender, no rebound or guarding. No hepatosplenomegaly. Normal bowel sounds throughout. Back: no tenderness to palpation, no deformities, no CVA tenderness  Extremities/Musculoskeletal: Normal range of motion. no tenderness. No edema. Distal extremities are neurovasc intact. Lymphadenopathy:   No adenopathy. Neurological:  Alert and oriented to person, place, and time. Coordination normal. CN 2-12 intact. Motor and sensory function intact. Skin: Skin is warm and dry. No rash noted. No pallor. MDM 34y F here with RODRIGUEZ, trouble breathing. Hx of lupus. Will check labs, CT head, CT chest, and reeval. Will give something for pain as well and reeval BP. ED Course       Procedures      ED EKG interpretation:  Rhythm: sinus tachycardia; and regular . Rate (approx.): 106; Axis: normal; P wave: normal; QRS interval: normal ; ST/T wave: normal;  This EKG was interpreted by Kwaku Cook MD,ED Provider.

## 2018-07-20 NOTE — PROGRESS NOTES
Hospital follow-up PCP transitional care appointment has been scheduled with Dr. Светлана Cha for Friday, 7/27/18 at 10:30 a.m. Pending patient discharge.   Juan De Luna, Care Management Specialist.

## 2018-07-20 NOTE — CONSULTS
Assessment:  ESRD: TTS [facility]  HTN: Uncontrolled. SOB: Etiology not clear. Will push UF with next HD to see if she needs new EDW. 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 Renal service will follow patient with you. Please contact me with any questions or concerns. Initial Consult note         Patient name: Delgado Goff  MR no: [id number]  Date of admission: 7/19/2018  Date of consultation: 7/20/2018  Requested by: Dr. Francisco Cullen  Reason for consult: ESRD    Patient seen and examined. History obtained from patient and chart review. Relevant labs, data and notes reviewed. HPI: Delgado Goff is a 29 y.o. female wellknown to our service with PMH significant for ESRD on chronic HD TTS at [facility], hx of lupus nephritis recently admitted last month with right paraneumonic loculated effusion s/p VATS decortication on 6/21/18. States she was in her usual state of health until post HD yesterday when she started developing SOB. BP in the ED elevated. REports good medication compliance. +HA. No CP. No fevers/chills. +Fatigue. Nephrology consulted to resume ESRD management. O2 sat 95% on RA currently. PMH:  Past Medical History:   Diagnosis Date    Anemia     secondary to lupus    Asthma     no inhaler use in past 2 to 3 years    Carditis     Chronic kidney disease     ESRD    Chronic pain     DDD (degenerative disc disease), lumbar     ESRD (end stage renal disease) (HCC)     GERD (gastroesophageal reflux disease)     [facility]     Hemodialysis patient ([facility] [address] [facility] 12/21/2017    [address]  Tuesday,  Thursday,  and Saturday.     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  Poor historian 2018    With medications    Thromboembolus ([facility] [facility] 2013    lungs    Transfusion history     Last Transfusion 2017  at [facility]     [address]:  Past Surgical History:   Procedure Laterality Date    HX  SECTION  11/2006    x1    HX OTHER SURGICAL  9/16/15    INSERTION PD CATH; Removed 2017    HX VASCULAR ACCESS Right 2017    Double-Lumen henry catheter upper chest       Social history:   Social History   Substance Use Topics    Smoking status: Never Smoker    Smokeless tobacco: Never Used    Alcohol use No       Family history:  No history of CKD or ESRD in the family.      Allergies   Allergen Reactions    Compazine [Prochlorperazine Edisylate] Nausea and Vomiting and Palpitations       Current Facility-Administered Medications   Medication Dose Route Frequency Last Dose    arformoterol (BROVANA) neb solution 15 mcg  15 mcg Nebulization BID RT      And    budesonide (PULMICORT) 500 mcg/2 ml nebulizer suspension  500 mcg Nebulization BID  mcg at 18 0710    sodium chloride (NS) flush 5-10 mL  5-10 mL IntraVENous Q8H 10 mL at 18 0640    sodium chloride (NS) flush 5-10 mL  5-10 mL IntraVENous PRN      aspirin chewable tablet 81 mg  81 mg Oral DAILY 81 mg at 18 0855    acetaminophen (TYLENOL) tablet 650 mg  650 mg Oral Q4H PRN      morphine (PF) 1 mg/mL injection 1 mg  1 mg IntraVENous Q4H PRN 1 mg at 18 1254    hydrALAZINE (APRESOLINE) 20 mg/mL injection 10 mg  10 mg IntraVENous Q6H PRN 10 mg at 18 4428    albuterol-ipratropium (DUO-NEB) 2.5 MG-0.5 MG/3 ML  3 mL Nebulization Q4H RT 3 mL at 07/20/18 1156    allopurinol (ZYLOPRIM) tablet 100 mg  100 mg Oral DAILY AFTER BREAKFAST 100 mg at 07/20/18 0856    amitriptyline (ELAVIL) tablet 25 mg  25 mg Oral QHS PRN      amLODIPine (NORVASC) tablet 5 mg  5 mg Oral DAILY 5 mg at 07/20/18 0855    apixaban (ELIQUIS) tablet 5 mg  5 mg Oral BID 5 mg at 07/20/18 0855    azaTHIOprine (IMURAN) tablet 50 mg  50 mg Oral DAILY AFTER BREAKFAST 50 mg at 07/20/18 0859    carvedilol (COREG) tablet 6.25 mg  6.25 mg Oral BID WITH MEALS 6.25 mg at 07/20/18 0856    cloNIDine HCl (CATAPRES) tablet 0.1 mg  0.1 mg Oral TID 0.1 mg at 07/20/18 0856    gemfibrozil (LOPID) tablet 300 mg  300 mg Oral DAILY 300 mg at 07/20/18 0855    HYDROmorphone (DILAUDID) tablet 2 mg  2 mg Oral Q4H PRN 2 mg at 07/20/18 0021    hydroxychloroquine (PLAQUENIL) tablet 200 mg  200 mg Oral  mg at 07/20/18 0856    losartan (COZAAR) tablet 50 mg  50 mg Oral DAILY 50 mg at 07/20/18 0856    oxyCODONE-acetaminophen (PERCOCET) 5-325 mg per tablet 1 Tab  1 Tab Oral BID PRN 1 Tab at 07/20/18 0141    pantoprazole (PROTONIX) tablet 40 mg  40 mg Oral ACB 40 mg at 07/20/18 2662    predniSONE (DELTASONE) tablet 10 mg  10 mg Oral DAILY 10 mg at 07/20/18 0855       ROS (besides HPI):    General: No fever. +Fatigue. No weight changes  ENT: No hearing loss or visual changes  Cardiovascular: No Chest pain  Pulmonary: + SOB  GI: No abdominal pain. No Nausea/Vomiting/Diarrhea. No blood in stool  : No blood in urine. No foamy or cloudy urine  Musculoskeletal: No joint swelling or redness.  No morning stiffness  Endocrine: no cold or heat intolerance  Psych: denies anxiety or depression  Neuro: No light headedness or dizziness    Objective   Visit Vitals    BP (!) 147/104 (BP 1 Location: Left arm, BP Patient Position: At rest;Sitting)    Pulse 95    Temp 98.3 °F (36.8 °C)    Resp 29    Ht 5' 5\" (1.651 m)    Wt 61.9 kg (136 lb 7.4 oz)    SpO2 100%    Breastfeeding No    BMI 22.71 kg/m2       Physical Exam:    Gen: NAD    HEENT: AT/NC, EOMI, dry mucous membrane, no scleral icterus    Neck: no JVD, no cervical lymphadenopathy, no carotid bruit    Lungs/Chest wall: Decreased AE b/l bases    Cardiovascular: Normal S1/S2, normal rate, regular rhythm. Abdomen: soft, NT, ND, BS+, no HSM    Ext: R UE AVF. No edema    Skin: warm and dry. No rashes    : no CVA tenderness    CNS: alert awake. Answers appropriately. Labs/Data:    Lab Results   Component Value Date/Time    Sodium 137 07/20/2018 03:07 AM    Potassium 3.9 07/20/2018 03:07 AM    Chloride 98 07/20/2018 03:07 AM    CO2 30 07/20/2018 03:07 AM    Anion gap 9 07/20/2018 03:07 AM    Glucose 99 07/20/2018 03:07 AM    BUN 26 (H) 07/20/2018 03:07 AM    Creatinine 4.44 (H) 07/20/2018 03:07 AM    BUN/Creatinine ratio 6 (L) 07/20/2018 03:07 AM    GFR est AA 14 (L) 07/20/2018 03:07 AM    GFR est non-AA 12 (L) 07/20/2018 03:07 AM    Calcium 8.7 07/20/2018 03:07 AM       Lab Results   Component Value Date/Time    WBC 3.5 (L) 07/20/2018 03:07 AM    Hemoglobin (POC) 7.8 (L) 01/19/2018 12:30 PM    HGB 9.0 (L) 07/20/2018 03:07 AM    Hematocrit (POC) 23 (L) 01/19/2018 12:30 PM    HCT 29.8 (L) 07/20/2018 03:07 AM    PLATELET 193 40/51/3282 03:07 AM    MCV 93.7 07/20/2018 03:07 AM       Urine analysis: No results found for this or any previous visit.       No components found for: SPEP, UPEP  Lab Results   Component Value Date/Time    Protein,urine 24 hr 2071.00 (H) 05/30/2009 10:00 PM    Total protein 7.30 07/27/2009 03:30 AM     Lab Results   Component Value Date/Time    Microalbumin/Creat ratio (mg/g creat) 1015 09/22/2009 06:30 PM    Microalb/Creat ratio (ug/mg creat.) 2443.5 (H) 03/07/2014 02:16 PM    Microalbumin,urine random 250.86 09/22/2009 06:30 PM       No intake or output data in the 24 hours ending 07/20/18 1326    Wt Readings from Last 3 Encounters:   07/20/18 61.9 kg (136 lb 7.4 oz)   07/02/18 69.9 kg (154 lb)   07/02/18 69.9 kg (154 lb) Signed by:  Brii Schmidt MD  Nephrology and Hypertension  [facility]

## 2018-07-20 NOTE — CONSULTS
CARDIOLOGY Consultation Note     Subjective:      Pratibha Hollis is a 29 y.o. 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    Encounter for monitoring opioid maintenance therapy Z51.81, Z79.891    Malignant hypertension I10    ESRD on peritoneal dialysis ([facility] N18.6, Z99.2    Anemia of renal disease D63.1    Dependence on peritoneal dialysis ([facility] [address] [facility] Z99.2    ACP (advance care planning) Z71.89    Chronic bilateral low back pain without sciatica M54.5, G89.29    Hypertension I10    Hypokalemia E87.6    Hypomagnesemia E83.42    Hypocalcemia E83.51    History of pulmonary embolism Z86.711    Peritonitis ([facility] K65.9    Generalized abdominal pain R10.84    Other constipation K59.09    Other ascites R18.8    Sepsis ([facility] A41.9    Intra-abdominal abscess ([facility] [facility] K65.1    Troponin level elevated R74.8    Rib pain on right side R07.81    Hypertensive urgency I16.0     No current facility-administered medications on file prior to encounter. Current Outpatient Prescriptions on File Prior to Encounter   Medication Sig Dispense Refill    carvedilol (COREG) 6.25 mg tablet 6.25 mg two (2) times daily (with meals).  oxyCODONE-acetaminophen (PERCOCET) 5-325 mg per tablet Take 1 Tab by mouth two (2) times daily as needed for Pain (severe back pain). Max Daily Amount: 2 Tabs. 60 Tab 0    predniSONE (DELTASONE) 5 mg tablet Take 2 Tabs by mouth daily.  30 Tab 0    losartan (COZAAR) 50 mg tablet Take 1 Tab by mouth daily for 30 days. 30 Tab 0    cloNIDine HCl (CATAPRES) 0.1 mg tablet Take 1 Tab by mouth three (3) times daily for 30 days. 90 Tab 0    senna (SENNA) 8.6 mg tablet Take 1 Tab by mouth daily as needed for Constipation. for constipation      amitriptyline (ELAVIL) 25 mg tablet Take 25 mg by mouth nightly.  fluticasone-vilanterol (BREO ELLIPTA) 200-25 mcg/dose inhaler Take 1 Puff by inhalation daily. Rinse mouth out after use 1 Inhaler 5    gemfibrozil (LOPID) 600 mg tablet Take 300 mg by mouth daily.  azaTHIOprine (IMURAN) 50 mg tablet Take 50 mg by mouth daily (after breakfast).  albuterol (PROVENTIL HFA, VENTOLIN HFA, PROAIR HFA) 90 mcg/actuation inhaler Take 1-2 Puffs by inhalation every four (4) hours as needed for Wheezing or Shortness of Breath. 1 Inhaler 2    hydroxychloroquine (PLAQUENIL) 200 mg tablet Take 200 mg by mouth two (2) times a day.  allopurinol (ZYLOPRIM) 100 mg tablet Take 100 mg by mouth daily (after breakfast). Needs to TAKE on a full stomach; will cause her to be nauseated.  pantoprazole (PROTONIX) 40 mg tablet Take 1 Tab by mouth Daily (before breakfast).  30 Tab 0     Current Facility-Administered Medications   Medication Dose Route Frequency Provider Last Rate Last Dose    arformoterol (BROVANA) neb solution 15 mcg  15 mcg Nebulization BID RT Leroy Childers MD        And    budesonide (PULMICORT) 500 mcg/2 ml nebulizer suspension  500 mcg Nebulization BID RT Leroy Childers MD   500 mcg at 07/20/18 0710    sodium chloride (NS) flush 5-10 mL  5-10 mL IntraVENous Q8H Leroy Childers MD   10 mL at 07/20/18 0640    sodium chloride (NS) flush 5-10 mL  5-10 mL IntraVENous PRN Leroy Childers MD        aspirin chewable tablet 81 mg  81 mg Oral DAILY Leroy Childers MD        acetaminophen (TYLENOL) tablet 650 mg  650 mg Oral Q4H PRN Leroy Childers MD        morphine (PF) 1 mg/mL injection 1 mg  1 mg IntraVENous Q4H PRN Dequan Steiner MD   1 mg at 07/20/18 0310    hydrALAZINE (APRESOLINE) 20 mg/mL injection 10 mg  10 mg IntraVENous Q6H PRN Dequan Steiner MD   10 mg at 07/19/18 2358    albuterol-ipratropium (DUO-NEB) 2.5 MG-0.5 MG/3 ML  3 mL Nebulization Q4H RT Dequan Steiner MD   3 mL at 07/20/18 0710    allopurinol (ZYLOPRIM) tablet 100 mg  100 mg Oral DAILY AFTER BREAKFAST Dequan Steiner MD        amitriptyline (ELAVIL) tablet 25 mg  25 mg Oral QHS PRN Dequan Steiner MD        amLODIPine (NORVASC) tablet 5 mg  5 mg Oral DAILY Dequan Steiner MD        apixaban (ELIQUIS) tablet 5 mg  5 mg Oral BID Dequan Steiner MD       [facility] azaTHIOprine (IMURAN) tablet 50 mg  50 mg Oral DAILY AFTER BREAKFAST Dequan Steiner MD        carvedilol (COREG) tablet 6.25 mg  6.25 mg Oral BID WITH MEALS Dequan Steiner MD        cloNIDine HCl (CATAPRES) tablet 0.1 mg  0.1 mg Oral TID Dequan Steiner MD   0.1 mg at 07/20/18 0021    gemfibrozil (LOPID) tablet 300 mg  300 mg Oral DAILY Dequan Steiner MD        HYDROmorphone (DILAUDID) tablet 2 mg  2 mg Oral Q4H PRN Dequan Steiner MD   2 mg at 07/20/18 0021    hydroxychloroquine (PLAQUENIL) tablet 200 mg  200 mg Oral BID Dequan Steiner MD        losartan (COZAAR) tablet 50 mg  50 mg Oral DAILY Dequan Steiner MD        oxyCODONE-acetaminophen (PERCOCET) 5-325 mg per tablet 1 Tab  1 Tab Oral BID PRN Dequan Steiner MD   1 Tab at 07/20/18 0141    pantoprazole (PROTONIX) tablet 40 mg  40 mg Oral ACB Dequan Steiner MD   40 mg at 07/20/18 5778    predniSONE (DELTASONE) tablet 10 mg  10 mg Oral DAILY Dequan Steiner MD         Allergies   Allergen Reactions    Compazine [Prochlorperazine Edisylate] Nausea and Vomiting and Palpitations     Past Medical History:   Diagnosis Date    Anemia     secondary to lupus    Asthma     no inhaler use in past 2 to 3 years    Carditis     Chronic kidney disease     ESRD    Chronic pain     DDD (degenerative disc disease), lumbar     [facility]) [facility]     GERD (gastroesophageal reflux disease)     Heart failure ([address]     Hemodialysis patient [facility]) 2017    [address]  Tuesday,  Thursday,  and Saturday.  Hypercholesterolemia     Hypertension     Intractable nausea and vomiting 10/21/2015    Long term (current) use of anticoagulants     Lupus     Lupus (systemic lupus erythematosus) (HCC)     Malignant hypertension with chronic kidney disease stage V ([address]     Peritoneal dialysis status ([facility] [address] [facility] 10/2015    x 2 years Stopped 2017 due to infection and removed.  Poor historian 2018    With medications    Thromboembolus ([facility] [address] [facility] 2013    lungs    Transfusion history     Last Transfusion 2017  at [facility]     Past Surgical History:   Procedure Laterality Date    HX  SECTION  11/2006    x1    HX OTHER SURGICAL  9/16/15    INSERTION PD CATH; Removed 2017    HX VASCULAR ACCESS Right 2017    Double-Lumen henry catheter upper chest     Family History   Problem Relation Age of Onset    Diabetes Father     Hypertension Father     Cancer Other      aunt with breast cancer    Diabetes Mother      Social History   Substance Use Topics    Smoking status: Never Smoker    Smokeless tobacco: Never Used    Alcohol use No        Review of Systems:   Constitutional: Negative for fever, chills, weight loss, + malaise/fatigue. HEENT: Negative for nosebleeds, vision changes. Respiratory: Negative for cough, hemoptysis  Cardiovascular: Negative for chest pain, palpitations, orthopnea, claudication, + leg swelling, no syncope, and PND. + SOB  Gastrointestinal: Negative for nausea, vomiting, diarrhea, blood in stool and melena. Genitourinary: Negative for dysuria, and hematuria. Musculoskeletal: Negative for myalgias, + arthralgia. Skin: Negative for rash. Heme: Does not bleed or bruise easily.    Neurological: Negative for speech change and focal weakness     Objective:     Visit Vitals    BP (!) 160/115 (BP 1 Location: Left arm, BP Patient Position: At rest)    Pulse 98    Temp 98.9 °F (37.2 °C)    Resp 26    Ht 5' 5\" (1.651 m)    Wt 136 lb 7.4 oz (61.9 kg)    SpO2 94%    Breastfeeding No    BMI 22.71 kg/m2      Physical Exam:   Constitutional: well-developed and well-nourished. No respiratory distress. Head: Normocephalic and atraumatic. Eyes: Pupils are equal, round  ENT: hearing normal  Neck: supple. No JVD present. Cardiovascular: Normal rate, regular rhythm. Exam reveals no gallop and no friction rub. No murmur heard. Pulmonary/Chest: Effort normal and breath sounds normal except for diminished basilar breath sounds. No wheezes. Abdominal: Soft, no tenderness. Musculoskeletal: 1+ edema. Neurological: alert,oriented. Skin: Skin is warm and dry  Psychiatric: normal mood and affect. Behavior is normal. Judgment and thought content normal.      EKG: sinus rhythm with T wave inversions precordially    Assessment/Plan:   Troponin elevation is mild and not typical presentation of ACS so this is not likely NSTEMI. Another echo had been ordered by attending but I think it is likely going to show normal LVEF  Need to control BP more  Will continue to follow  Renal failure managed by Dr Angie Jenkins    Thank you for involving me in this patient's care and please call with further concerns or questions. Michelle Wells M.D.   Electrophysiology/Cardiology  [facility] [place]  [address], [address]  [facility], [facility] [address]  [phone]

## 2018-07-20 NOTE — PROGRESS NOTES
TRANSFER - IN REPORT:    Verbal report received from DANITZA Kruse(name) on [place]  being received from ED(unit) for routine progression of care      Report consisted of patients Situation, Background, Assessment and   Recommendations(SBAR). Information from the following report(s) SBAR, Kardex, ED Summary, Procedure Summary, Intake/Output, MAR, Recent Results and Cardiac Rhythm ST was reviewed with the receiving nurse. Opportunity for questions and clarification was provided. Assessment completed upon patients arrival to unit and care assumed. 0000 - duo skin assessment completed with DANITZA Lan. Skin overall intact with no open wounds, except surgical scar on R lateral and back area from chest tube she had. 0130- Called and spoke to [facility] regarding patient BP of 195/137. Order received. 0802- Bedside shift change report given to Sera Haro (oncoming nurse) by Ramesh Malave (offgoing nurse).  Report included the following information SBAR, Kardex, ED Summary, Procedure Summary, Intake/Output, Recent Results and Cardiac Rhythm SR.

## 2018-07-20 NOTE — PROGRESS NOTES
Problem: Hypertension  Goal: *Blood pressure within specified parameters  Outcome: Not Progressing Towards Goal  BP still remain elevated but improved from initially came in. Last BP checked was 150/103. Patient resting comfortably in bed   Goal: *Labs within defined limits  Outcome: Progressing Towards Goal  Labs not entirely out of range. Troponin trend down from previous result.

## 2018-07-20 NOTE — PROGRESS NOTES
Reason for Admission:   Shortness of breath, elevated Troponin, headache      RRAT Score:     23      Resources/supports as identified by patient/family:   Patient is covered by Medicare A/B as well as Medicaid. She receives disability monthly. Her PCP is Dr Lissett Bowers facing patient (as identified by patient/family and CM):        Finances/Medication cost?      See above      Transportation? Transportation provided by there parents, whom she lives with.  [facility]  Support system or lack thereof? Mother and father are primary support system and she also has caregivers at home.      Living arrangements? Lives in house with her parents. Has caregivers to assist with ADLs.     Self-care/ADLs/Cognition? Alert/oriented x4.      Current Advanced Directive/Advance Care Plan:  None. Patient is not interested at this time.      Plan for utilizing home health:    Patient currently open to [facility] for SN. Will need resumption orders at discharge.      Likelihood of readmission:   High risk/Red zone      Transition of Care Plan:        Anticipate discharge to home, transported by her parents. Will resume [facility] services if needed.

## 2018-07-20 NOTE — PROGRESS NOTES
Admission Medication Reconciliation:    Information obtained from: Patient     Significant PMH/Disease States:   Past Medical History:   Diagnosis Date    Anemia     secondary to lupus    Asthma     no inhaler use in past 2 to 3 years    Carditis     Chronic kidney disease     ESRD    Chronic pain     DDD (degenerative disc disease), lumbar     ESRD (end stage renal disease) (HCC)     GERD (gastroesophageal reflux disease)     [facility]     Hemodialysis patient ([facility] [facility] 12/21/2017    [address]  Tuesday,  Thursday,  and Saturday.  Hypercholesterolemia     Hypertension     Intractable nausea and vomiting 10/21/2015    Long term (current) use of anticoagulants     Lupus     Lupus (systemic lupus erythematosus) (HCC)     Malignant hypertension with chronic kidney disease stage V ([facility]     [facility]    x 2 years Stopped 12/2017 due to infection and removed.  Poor historian 01/17/2018    With medications    Thromboembolus [facility]) 2013    lungs    Transfusion history     Last Transfusion 12/21/2017  at [facility]       Chief Complaint for this Admission:  Hypertensive crisis    Allergies:  Compazine [prochlorperazine edisylate]    Prior to Admission Medications:   Prior to Admission Medications   Prescriptions Last Dose Informant Patient Reported? Taking? albuterol (PROVENTIL HFA, VENTOLIN HFA, PROAIR HFA) 90 mcg/actuation inhaler  Self No Yes   Sig: Take 1-2 Puffs by inhalation every four (4) hours as needed for Wheezing or Shortness of Breath. allopurinol (ZYLOPRIM) 100 mg tablet  Self Yes Yes   Sig: Take 100 mg by mouth daily (after breakfast). Needs to TAKE on a full stomach; will cause her to be nauseated. amLODIPine (NORVASC) 5 mg tablet 7/19/2018 at Unknown time  Yes Yes   Sig: Take 5 mg by mouth daily. amitriptyline (ELAVIL) 25 mg tablet   Yes Yes   Sig: Take 25 mg by mouth nightly.    apixaban (ELIQUIS) 5 mg tablet   Yes Yes   Sig: Take 5 mg by mouth two (2) times a day. Indications: pulmonary thromboembolism   azaTHIOprine (IMURAN) 50 mg tablet 2018 at Unknown time Self Yes Yes   Sig: Take 50 mg by mouth daily (after breakfast). carvedilol (COREG) 6.25 mg tablet   Yes Yes   Si.25 mg two (2) times daily (with meals). cloNIDine HCl (CATAPRES) 0.1 mg tablet 2018 at Unknown time  No Yes   Sig: Take 1 Tab by mouth three (3) times daily for 30 days. fluticasone-vilanterol (BREO ELLIPTA) 200-25 mcg/dose inhaler 2018 at Unknown time Self No Yes   Sig: Take 1 Puff by inhalation daily. Rinse mouth out after use   gemfibrozil (LOPID) 600 mg tablet 2018 at Unknown time Self Yes Yes   Sig: Take 300 mg by mouth daily. hydroxychloroquine (PLAQUENIL) 200 mg tablet 2018 at Unknown time Self Yes Yes   Sig: Take 200 mg by mouth two (2) times a day. losartan (COZAAR) 50 mg tablet 2018 at Unknown time  No Yes   Sig: Take 1 Tab by mouth daily for 30 days. oxyCODONE-acetaminophen (PERCOCET) 5-325 mg per tablet   No Yes   Sig: Take 1 Tab by mouth two (2) times daily as needed for Pain (severe back pain). Max Daily Amount: 2 Tabs. pantoprazole (PROTONIX) 40 mg tablet 2018 at Unknown time Self No Yes   Sig: Take 1 Tab by mouth Daily (before breakfast). polyethylene glycol (MIRALAX) 17 gram packet   Yes Yes   Sig: Take 17 g by mouth daily. predniSONE (DELTASONE) 5 mg tablet   No Yes   Sig: Take 2 Tabs by mouth daily. senna (SENNA) 8.6 mg tablet   Yes Yes   Sig: Take 1 Tab by mouth daily as needed for Constipation. for constipation      Facility-Administered Medications: None         Comments/Recommendations: Med rec completed after speaking to the patient. Removed Cyanocobalamin, Probiotic, Hydromorphone. Changed Amlodipine to 5 mg daily, Carvedilol to 6.25 mg. Confirmed Apixaban is 5 mg bid for PE that occurred a couple of years ago.       Paul Vasquez, AureD

## 2018-07-20 NOTE — PROGRESS NOTES
Problem: Hypertension  Goal: *Blood pressure within specified parameters  Outcome: Progressing Towards Goal  Routine and PRN BP checks completed. Meds adjusted by physicians and PRN meds available when needed. Pt A&Ox4. Decreased sensory stimulation and allowed rest as often as possible.

## 2018-07-21 LAB
ALBUMIN SERPL-MCNC: 2.3 G/DL (ref 3.5–5)
ALBUMIN/GLOB SERPL: 0.5 {RATIO} (ref 1.1–2.2)
ALP SERPL-CCNC: 119 U/L (ref 45–117)
ALT SERPL-CCNC: 11 U/L (ref 12–78)
ANION GAP SERPL CALC-SCNC: 9 MMOL/L (ref 5–15)
AST SERPL-CCNC: 19 U/L (ref 15–37)
BILIRUB SERPL-MCNC: 0.3 MG/DL (ref 0.2–1)
BUN SERPL-MCNC: 41 MG/DL (ref 6–20)
BUN/CREAT SERPL: 6 (ref 12–20)
CALCIUM SERPL-MCNC: 8.7 MG/DL (ref 8.5–10.1)
CHLORIDE SERPL-SCNC: 99 MMOL/L (ref 97–108)
CO2 SERPL-SCNC: 30 MMOL/L (ref 21–32)
CREAT SERPL-MCNC: 6.62 MG/DL (ref 0.55–1.02)
ERYTHROCYTE [DISTWIDTH] IN BLOOD BY AUTOMATED COUNT: 19.3 % (ref 11.5–14.5)
GLOBULIN SER CALC-MCNC: 5.1 G/DL (ref 2–4)
GLUCOSE BLD STRIP.AUTO-MCNC: 105 MG/DL (ref 65–100)
GLUCOSE BLD STRIP.AUTO-MCNC: 111 MG/DL (ref 65–100)
GLUCOSE BLD STRIP.AUTO-MCNC: 112 MG/DL (ref 65–100)
GLUCOSE BLD STRIP.AUTO-MCNC: 138 MG/DL (ref 65–100)
GLUCOSE SERPL-MCNC: 118 MG/DL (ref 65–100)
HCT VFR BLD AUTO: 31.1 % (ref 35–47)
HGB BLD-MCNC: 9.2 G/DL (ref 11.5–16)
MAGNESIUM SERPL-MCNC: 1.8 MG/DL (ref 1.6–2.4)
MCH RBC QN AUTO: 28.3 PG (ref 26–34)
MCHC RBC AUTO-ENTMCNC: 29.6 G/DL (ref 30–36.5)
MCV RBC AUTO: 95.7 FL (ref 80–99)
NRBC # BLD: 0.02 K/UL (ref 0–0.01)
NRBC BLD-RTO: 0.4 PER 100 WBC
PHOSPHATE SERPL-MCNC: 4.1 MG/DL (ref 2.6–4.7)
PLATELET # BLD AUTO: 272 K/UL (ref 150–400)
PMV BLD AUTO: 10.5 FL (ref 8.9–12.9)
POTASSIUM SERPL-SCNC: 5.9 MMOL/L (ref 3.5–5.1)
PROT SERPL-MCNC: 7.4 G/DL (ref 6.4–8.2)
RBC # BLD AUTO: 3.25 M/UL (ref 3.8–5.2)
SERVICE CMNT-IMP: ABNORMAL
SODIUM SERPL-SCNC: 138 MMOL/L (ref 136–145)
WBC # BLD AUTO: 5.1 K/UL (ref 3.6–11)

## 2018-07-21 PROCEDURE — 94762 N-INVAS EAR/PLS OXIMTRY CONT: CPT

## 2018-07-21 PROCEDURE — 74011250637 HC RX REV CODE- 250/637: Performed by: FAMILY MEDICINE

## 2018-07-21 PROCEDURE — 74011250636 HC RX REV CODE- 250/636: Performed by: FAMILY MEDICINE

## 2018-07-21 PROCEDURE — 94640 AIRWAY INHALATION TREATMENT: CPT

## 2018-07-21 PROCEDURE — 82962 GLUCOSE BLOOD TEST: CPT

## 2018-07-21 PROCEDURE — 80053 COMPREHEN METABOLIC PANEL: CPT | Performed by: INTERNAL MEDICINE

## 2018-07-21 PROCEDURE — 94760 N-INVAS EAR/PLS OXIMETRY 1: CPT

## 2018-07-21 PROCEDURE — 83735 ASSAY OF MAGNESIUM: CPT | Performed by: INTERNAL MEDICINE

## 2018-07-21 PROCEDURE — 90935 HEMODIALYSIS ONE EVALUATION: CPT

## 2018-07-21 PROCEDURE — 36415 COLL VENOUS BLD VENIPUNCTURE: CPT | Performed by: INTERNAL MEDICINE

## 2018-07-21 PROCEDURE — 74011250636 HC RX REV CODE- 250/636: Performed by: INTERNAL MEDICINE

## 2018-07-21 PROCEDURE — 74011636637 HC RX REV CODE- 636/637: Performed by: FAMILY MEDICINE

## 2018-07-21 PROCEDURE — 65660000000 HC RM CCU STEPDOWN

## 2018-07-21 PROCEDURE — 85027 COMPLETE CBC AUTOMATED: CPT | Performed by: INTERNAL MEDICINE

## 2018-07-21 PROCEDURE — 74011000250 HC RX REV CODE- 250: Performed by: FAMILY MEDICINE

## 2018-07-21 PROCEDURE — 74011250637 HC RX REV CODE- 250/637: Performed by: INTERNAL MEDICINE

## 2018-07-21 PROCEDURE — 84100 ASSAY OF PHOSPHORUS: CPT | Performed by: INTERNAL MEDICINE

## 2018-07-21 PROCEDURE — 5A1D70Z PERFORMANCE OF URINARY FILTRATION, INTERMITTENT, LESS THAN 6 HOURS PER DAY: ICD-10-PCS | Performed by: INTERNAL MEDICINE

## 2018-07-21 RX ORDER — IPRATROPIUM BROMIDE AND ALBUTEROL SULFATE 2.5; .5 MG/3ML; MG/3ML
3 SOLUTION RESPIRATORY (INHALATION) 2 TIMES DAILY
Status: DISCONTINUED | OUTPATIENT
Start: 2018-07-21 | End: 2018-07-22 | Stop reason: HOSPADM

## 2018-07-21 RX ADMIN — HYDROXYCHLOROQUINE SULFATE 200 MG: 200 TABLET, FILM COATED ORAL at 19:10

## 2018-07-21 RX ADMIN — BUDESONIDE 500 MCG: 0.5 INHALANT RESPIRATORY (INHALATION) at 08:09

## 2018-07-21 RX ADMIN — IPRATROPIUM BROMIDE AND ALBUTEROL SULFATE 3 ML: .5; 3 SOLUTION RESPIRATORY (INHALATION) at 03:39

## 2018-07-21 RX ADMIN — Medication 1 MG: at 06:56

## 2018-07-21 RX ADMIN — Medication 10 ML: at 19:11

## 2018-07-21 RX ADMIN — AMLODIPINE BESYLATE 10 MG: 5 TABLET ORAL at 09:50

## 2018-07-21 RX ADMIN — APIXABAN 5 MG: 5 TABLET, FILM COATED ORAL at 09:50

## 2018-07-21 RX ADMIN — Medication 1 MG: at 19:10

## 2018-07-21 RX ADMIN — HYDROMORPHONE HYDROCHLORIDE 2 MG: 2 TABLET ORAL at 16:46

## 2018-07-21 RX ADMIN — Medication 1 MG: at 01:01

## 2018-07-21 RX ADMIN — BUDESONIDE 500 MCG: 0.5 INHALANT RESPIRATORY (INHALATION) at 21:00

## 2018-07-21 RX ADMIN — AMITRIPTYLINE HYDROCHLORIDE 25 MG: 50 TABLET, FILM COATED ORAL at 21:44

## 2018-07-21 RX ADMIN — APIXABAN 5 MG: 5 TABLET, FILM COATED ORAL at 19:11

## 2018-07-21 RX ADMIN — CLONIDINE HYDROCHLORIDE 0.1 MG: 0.1 TABLET ORAL at 21:44

## 2018-07-21 RX ADMIN — EPOETIN ALFA 10000 UNITS: 10000 SOLUTION INTRAVENOUS; SUBCUTANEOUS at 21:44

## 2018-07-21 RX ADMIN — CLONIDINE HYDROCHLORIDE 0.1 MG: 0.1 TABLET ORAL at 09:50

## 2018-07-21 RX ADMIN — LOSARTAN POTASSIUM 50 MG: 50 TABLET ORAL at 09:50

## 2018-07-21 RX ADMIN — CARVEDILOL 12.5 MG: 12.5 TABLET, FILM COATED ORAL at 09:50

## 2018-07-21 RX ADMIN — IPRATROPIUM BROMIDE AND ALBUTEROL SULFATE 3 ML: .5; 3 SOLUTION RESPIRATORY (INHALATION) at 08:09

## 2018-07-21 RX ADMIN — Medication 1 MG: at 11:59

## 2018-07-21 RX ADMIN — Medication 10 ML: at 06:56

## 2018-07-21 RX ADMIN — ALLOPURINOL 100 MG: 100 TABLET ORAL at 09:50

## 2018-07-21 RX ADMIN — PREDNISONE 10 MG: 5 TABLET ORAL at 09:50

## 2018-07-21 RX ADMIN — HYDROXYCHLOROQUINE SULFATE 200 MG: 200 TABLET, FILM COATED ORAL at 09:50

## 2018-07-21 RX ADMIN — ASPIRIN 81 MG 81 MG: 81 TABLET ORAL at 09:50

## 2018-07-21 RX ADMIN — HYDROMORPHONE HYDROCHLORIDE 2 MG: 2 TABLET ORAL at 21:44

## 2018-07-21 RX ADMIN — GEMFIBROZIL 300 MG: 600 TABLET ORAL at 09:51

## 2018-07-21 RX ADMIN — CARVEDILOL 12.5 MG: 12.5 TABLET, FILM COATED ORAL at 19:10

## 2018-07-21 RX ADMIN — CLONIDINE HYDROCHLORIDE 0.1 MG: 0.1 TABLET ORAL at 17:50

## 2018-07-21 RX ADMIN — PANTOPRAZOLE SODIUM 40 MG: 40 TABLET, DELAYED RELEASE ORAL at 06:59

## 2018-07-21 RX ADMIN — AZATHIOPRINE 50 MG: 50 TABLET ORAL at 09:51

## 2018-07-21 RX ADMIN — AMITRIPTYLINE HYDROCHLORIDE 25 MG: 50 TABLET, FILM COATED ORAL at 01:01

## 2018-07-21 RX ADMIN — IPRATROPIUM BROMIDE AND ALBUTEROL SULFATE 3 ML: .5; 3 SOLUTION RESPIRATORY (INHALATION) at 20:01

## 2018-07-21 NOTE — DIALYSIS
[facility]  [phone]    Vitals   Pre   Post   Assessment   Pre   Post     Temp  Temp: 98 °F (36.7 °C) (07/21/18 1530)  98.198.1 LOC  A+OX3 A+OX3   HR   Pulse (Heart Rate): 85 (HD STARTED) (07/21/18 1530) 102       Lungs   CLEAR  CLEAR   B/P   BP: (!) 145/103 (07/21/18 1530) 158/109 Cardiac   REGULAR SINUS  MONITORED  REGULAR SINUS  MONITORED   Resp   Resp Rate: 16 (07/21/18 1530) 16 Skin   WARM AND DRY  WARM AND DRY      Pain level  Pain Intensity 1: 7 (07/21/18 1156) 5/10 all over body ache primary nurse aware Edema  NONE NOTED     NONE   Orders:    Duration:   Start:    8646 End:    1681 Total:   3 HRS   Dialyzer:   Dialyzer/Set Up Inspection: Revaclear (07/21/18 1530)   K Bath:   Dialysate K (mEq/L): 3 (07/21/18 1530)   Ca Bath:   Dialysate CA (mEq/L): 2.5 (07/21/18 1530)   Na/Bicarb:   Dialysate NA (mEq/L): 140 (07/21/18 1530)   Target Fluid Removal:   Goal/Amount of Fluid to Remove (mL): 3000 mL (07/21/18 1530)   Access     Type & Location:   LEFT ARM AVG +BRUIT/THRILL NO S/S OF INFECTION CANNULATED WITH 15G 1 INCH NEEDLES X2  +FLASH/ASP/NS FLUSH ART/VENOUS   Labs     Obtained/Reviewed   Critical Results Called   Date when labs were drawn-  Hgb-    HGB   Date Value Ref Range Status   07/20/2018 9.0 (L) 11.5 - 16.0 g/dL Final     K-    Potassium   Date Value Ref Range Status   07/20/2018 3.9 3.5 - 5.1 mmol/L Final     Ca-   Calcium   Date Value Ref Range Status   07/20/2018 8.7 8.5 - 10.1 MG/DL Final     Bun-   BUN   Date Value Ref Range Status   07/20/2018 26 (H) 6 - 20 MG/DL Final     Creat-   Creatinine   Date Value Ref Range Status   07/20/2018 4.44 (H) 0.55 - 1.02 MG/DL Final        Medications/ Blood Products Given     Name   Dose   Route and Time     NONE                Blood Volume Processed (BVP):    68 L Net Fluid   Removed:  3000 ML   Comments   Time Out Done: [facility]  Primary Nurse Rpt Bianka Skaggs  Primary Nurse Rpt Kev Rivera Pt Education:PROCEDURAL  Care Plan:CONTINUE HD AS ORDERED BY NEPHROLOGIST TUE/THUR/SAT    Tx Summary: HALF WAY THROUGH TX BMP RESULTED TODAY  K 5.9 BATH CHANGED TO 2K, EDUCATED PATIENT REGARDING POTASSIUM/DIET/RISK WITH HIGH POTASSIUM, TOLERATED FULL 3 HRS OF HD WITH 3 KG REMOVED, AT END OF TX ALL POSSIBLE BLOOD RETURNED IN CIRCUIT WITH 300 ML NS, ART/VENOUS NEEDLES REMOVED PRESSURE HELD BLEEDING <8MIN ART/VENOUS, RIGHT ARM AVG +BRUIT/THRILL NO S/S OF INFECTION GAUZE AND TAPE APPLIED, REMAINS IN ROOM 423, CALL BELL WITHIN REACH, BED IN LOWEST POSITION    Admiting Diagnosis:SOB  Pt's previous clinic- [facility]  PRIMARY NEPHROLOGIST  Consent signed - Informed Consent Verified: Yes (07/21/18 1530)  [facility] Consent - YES  Hepatitis Status- NEGATIVE 06/24/18  Machine #- Machine Number: [id number] ([id number]) (07/21/18 1530)  Telemetry status-MONITORED SINUS RHYTHM  Pre-dialysis wt. - Pre-Dialysis Weight: 63 kg (138 lb 14.2 oz) (07/21/18 1530)

## 2018-07-21 NOTE — PROGRESS NOTES
Problem: Hypertension  Goal: *Blood pressure within specified parameters  Outcome: Progressing Towards Goal  Assess vital signs (blood pressure)  Monitor for changes in blood pressure  Assess patient for dizziness, headache, change in mental status  Maintain calm environment  Administer blood pressure medications as ordered/needed

## 2018-07-21 NOTE — PROGRESS NOTES
Problem: Hypertension  Goal: *Blood pressure within specified parameters  Outcome: Progressing Towards Goal  Last BP at 0400 was 126/83. No PRN BP meds given throughout shift. Problem: Falls - Risk of  Goal: *Absence of Falls  Document Alex Fall Risk and appropriate interventions in the flowsheet. Outcome: Progressing Towards Goal  Patient ambulates with standby assistance. Belongings and call bell within reach. Nonskid footwear in use. Bed wheels locked and in low position. Patient demonstrates understanding of call bell use for bathroom assistance. 0400 - Unable to draw morning labs, patient is a hard stick. Can hopefully be drawn during dialysis today. 0800 - Bedside shift change report given to [facility] (oncoming nurse) by Sudhir Wang (offgoing nurse). Report included the following information SBAR, Kardex, ED Summary, Intake/Output, MAR and Cardiac Rhythm NSR/ST.

## 2018-07-21 NOTE — PROGRESS NOTES
Avinash ATWOOD.  Can patient be downgraded? Per MD call him after patient has finished dialysis with blood pressure reading for him to decide if patient can be downgraded        1841-medications held due to patient on dialysis    1930-Bedside and Verbal shift change report given to Jaxson Sharif RN (oncoming nurse) by Rani Crain RN (offgoing nurse). Report included the following information SBAR, Kardex, Intake/Output, MAR and Recent Results.

## 2018-07-21 NOTE — PROGRESS NOTES
[facility]  -Progress Note     Isac Manjarrez [phone]   7/21/2018      Derrell Gonzalez M.D. , F.A.C.C.   --------PCP:-Jay Prado NP   -----Subjective:   . Alexander Dasilva is a 29 y.o. female   Comfortable   No complaints  Denies chest pain, heart palpitations , increasing edema, pre-syncope or shortness of breath at rest   No problems overnight, rhythm and hemodynamics stable -see vitals below    Some diastolic only elevation  Patient Vitals for the past 12 hrs:   Temp Pulse Resp BP SpO2   07/21/18 1202 - - - (!) 136/96 -   07/21/18 1135 98.4 °F (36.9 °C) 88 11 (!) 133/108 95 %   07/21/18 0950 - 92 - (!) 140/103 -   07/21/18 0948 - - - - 96 %   07/21/18 0821 98.3 °F (36.8 °C) 88 19 (!) 125/93 98 %   07/21/18 0810 - - - - 98 %   07/21/18 0409 97.3 °F (36.3 °C) 87 24 126/83 95 %        Discussion/Plans/Recs  Admit with orthopnea, SOB and high BP-high BNP due to volume overlaod  HTN  ESRD Dr Melanie Anderson  SLE  H/o DVT    This seems due to HTN presentation and renal dz  The troponin are likely stretch from high BP  Will sign, off, glad to see back if we can help  PRN         Cardiac Studies/Hx:  No specialty comments available. Past Medical History:   Diagnosis Date    Anemia     secondary to lupus    Asthma     no inhaler use in past 2 to 3 years    Carditis     Chronic kidney disease     ESRD    Chronic pain     DDD (degenerative disc disease), lumbar     ESRD (end stage renal disease) (HCC)     GERD (gastroesophageal reflux disease)     [facility] ([facility]     Hemodialysis patient ([facility] 12/21/2017    [address]  Tuesday,  Thursday,  and Saturday.      Hypercholesterolemia     Hypertension     Intractable nausea and vomiting 10/21/2015    Long term (current) use of anticoagulants     Lupus     Lupus (systemic lupus erythematosus) (HCC)     Malignant hypertension with chronic kidney disease stage V ([facility]     Peritoneal dialysis status ([facility] 10/2015    x 2 years Stopped 12/2017 due to infection and removed.  Poor historian 01/17/2018    With medications    Thromboembolus [facility]) 2013    lungs    Transfusion history     Last Transfusion 12/21/2017  at [facility]      ROS-pertinents  negative except as above  The pertinent portions of the medical history,physician and nursing notes, meds,vitals , labs and Ins/Outs,are reviewed in the electronic record.     Results for orders placed or performed during the hospital encounter of 07/19/18   EKG, 12 LEAD, INITIAL   Result Value Ref Range    Ventricular Rate 93 BPM    Atrial Rate 93 BPM    P-R Interval 146 ms    QRS Duration 76 ms    Q-T Interval 402 ms    QTC Calculation (Bezet) 499 ms    Calculated P Axis 22 degrees    Calculated R Axis 22 degrees    Calculated T Axis -31 degrees    Diagnosis       Normal sinus rhythm  T wave abnormality, consider anterior ischemia  When compared with ECG of 19-JUL-2018 19:36,  MANUAL COMPARISON REQUIRED, DATA IS UNCONFIRMED  Confirmed by Prateek Elmore MD, Sudhir ([id number]) on 7/20/2018 8:52:38 AM        Vitals:    07/21/18 0948 07/21/18 0950 07/21/18 1135 07/21/18 1202   BP:  (!) 140/103 (!) 133/108 (!) 136/96   BP 1 Location:   Left arm Left arm   BP Patient Position:   At rest;Sitting At rest;Sitting   Pulse:  92 88    Resp:   11    Temp:   98.4 °F (36.9 °C)    SpO2: 96%  95%    Weight:       Height:           Objective:    Physical Exam:   Patient Vitals for the past 12 hrs:   Temp Pulse Resp BP SpO2   07/21/18 1202 - - - (!) 136/96 -   07/21/18 1135 98.4 °F (36.9 °C) 88 11 (!) 133/108 95 %   07/21/18 0950 - 92 - (!) 140/103 -   07/21/18 0948 - - - - 96 %   07/21/18 0821 98.3 °F (36.8 °C) 88 19 (!) 125/93 98 %   07/21/18 0810 - - - - 98 %   07/21/18 0409 97.3 °F (36.3 °C) 87 24 126/83 95 %      General:  alert, cooperative, no distress, appears stated age   ENT, Neck:  no jvd   Chest Wall: inspection normal - no chest wall deformities or tenderness, respiratory effort normal   Lung: clear to auscultation bilaterally   Heart:  normal rate, regular rhythm, normal S1, S2, no murmurs, rubs, clicks or gallops   Abdomen: nondistended   Extremities: extremities normal, atraumatic, no cyanosis or edema     Last 24hr Input/Output:    Intake/Output Summary (Last 24 hours) at 07/21/18 1421  Last data filed at 07/20/18 1600   Gross per 24 hour   Intake              240 ml   Output                0 ml   Net              240 ml        Data Review:   Recent Results (from the past 24 hour(s))   GLUCOSE, POC    Collection Time: 07/20/18  9:56 PM   Result Value Ref Range    Glucose (POC) 192 (H) 65 - 100 mg/dL    Performed by Cosmo COOPER    GLUCOSE, POC    Collection Time: 07/21/18  6:49 AM   Result Value Ref Range    Glucose (POC) 112 (H) 65 - 100 mg/dL    Performed by Cosmo COOPER    GLUCOSE, POC    Collection Time: 07/21/18 11:38 AM   Result Value Ref Range    Glucose (POC) 105 (H) 65 - 100 mg/dL    Performed by Barrett Soler MD 7/21/2018

## 2018-07-21 NOTE — PROGRESS NOTES
Hospitalist Progress Note            Daily Progress Note: 2018    Assessment/Plan:   1. Hypertensive urgency - better with PO clonidine, Coreg and Norvasc; follow  2. ESRD - for HD today  3. SLE - continue current regimen  4. H/o DVT - continue Eliquis  5. Dyspnea - improved with treatment of the HTN - follow  6. Headache - better with treatment of the HTN  7. Recent PNA with parapneumonic effusion    DISPO: home soon, possibly in the AM       Subjective:   Feels much better. Breathing better. BPs better. Overnight events discussed with nursing. Review of Systems:   No CP, reasonable appetite. Objective:     Visit Vitals    BP (!) 125/93 (BP 1 Location: Left arm, BP Patient Position: Lying right side)    Pulse 88    Temp 98.3 °F (36.8 °C)    Resp 19    Ht 5' 5\" (1.651 m)    Wt 63 kg (139 lb)  Comment: discrepancy, bed weight vs standing    SpO2 98%    Breastfeeding No    BMI 23.13 kg/m2      O2 Device: Room air    Temp (24hrs), Av.1 °F (36.7 °C), Min:97.3 °F (36.3 °C), Max:98.3 °F (36.8 °C)            1901 -  0700  In: 720 [P.O.:720]  Out: -     EXAM:  General: WD, WN. Alert, cooperative, no acute distress    HEENT: NC, atraumatic. PERRL, EOMI. Anicteric sclerae. Neck:   Supple, trachea midline  Lungs:  CTA bilaterally. No Wheezing/Rhonchi/Rales. Heart:  Regular rhythm,  No murmur (), No Rubs, No Gallops  Abdomen: Soft, Non distended, Non tender.  +Bowel sounds, no HSM  Extremities: No c/c/e  Neurologic:  CN 2-12 gi, Alert and oriented X 3. No acute neurological distress   Psych:   Fair insight. Not anxious nor agitated.         Data Review:     Recent Results (from the past 24 hour(s))   GLUCOSE, POC    Collection Time: 18  9:56 PM   Result Value Ref Range    Glucose (POC) 192 (H) 65 - 100 mg/dL    Performed by Faye Councilman T    GLUCOSE, POC    Collection Time: 18  6:49 AM   Result Value Ref Range    Glucose (POC) 112 (H) 65 - 100 mg/dL Performed by James COOPER        Active Problems:    Hypertensive urgency (7/19/2018)        Medications reviewed  Current Facility-Administered Medications   Medication Dose Route Frequency    arformoterol (BROVANA) neb solution 15 mcg  15 mcg Nebulization BID RT    And    budesonide (PULMICORT) 500 mcg/2 ml nebulizer suspension  500 mcg Nebulization BID RT    carvedilol (COREG) tablet 12.5 mg  12.5 mg Oral BID WITH MEALS    amLODIPine (NORVASC) tablet 10 mg  10 mg Oral DAILY    epoetin pia (EPOGEN;PROCRIT) injection 10,000 Units  10,000 Units SubCUTAneous DIALYSIS TUE, THU & SAT    sodium chloride (NS) flush 5-10 mL  5-10 mL IntraVENous Q8H    sodium chloride (NS) flush 5-10 mL  5-10 mL IntraVENous PRN    aspirin chewable tablet 81 mg  81 mg Oral DAILY    acetaminophen (TYLENOL) tablet 650 mg  650 mg Oral Q4H PRN    morphine (PF) 1 mg/mL injection 1 mg  1 mg IntraVENous Q4H PRN    hydrALAZINE (APRESOLINE) 20 mg/mL injection 10 mg  10 mg IntraVENous Q6H PRN    albuterol-ipratropium (DUO-NEB) 2.5 MG-0.5 MG/3 ML  3 mL Nebulization Q4H RT    allopurinol (ZYLOPRIM) tablet 100 mg  100 mg Oral DAILY AFTER BREAKFAST    amitriptyline (ELAVIL) tablet 25 mg  25 mg Oral QHS PRN    apixaban (ELIQUIS) tablet 5 mg  5 mg Oral BID    azaTHIOprine (IMURAN) tablet 50 mg  50 mg Oral DAILY AFTER BREAKFAST    cloNIDine HCl (CATAPRES) tablet 0.1 mg  0.1 mg Oral TID    gemfibrozil (LOPID) tablet 300 mg  300 mg Oral DAILY    HYDROmorphone (DILAUDID) tablet 2 mg  2 mg Oral Q4H PRN    hydroxychloroquine (PLAQUENIL) tablet 200 mg  200 mg Oral BID    losartan (COZAAR) tablet 50 mg  50 mg Oral DAILY    oxyCODONE-acetaminophen (PERCOCET) 5-325 mg per tablet 1 Tab  1 Tab Oral BID PRN    pantoprazole (PROTONIX) tablet 40 mg  40 mg Oral ACB    predniSONE (DELTASONE) tablet 10 mg  10 mg Oral DAILY       DVT prophylaxis: on Eliquis    Total time spent with patient: 20 minutes    Fabby Avitia MD

## 2018-07-22 VITALS
OXYGEN SATURATION: 98 % | RESPIRATION RATE: 22 BRPM | WEIGHT: 135.5 LBS | TEMPERATURE: 98.3 F | BODY MASS INDEX: 22.57 KG/M2 | HEART RATE: 96 BPM | SYSTOLIC BLOOD PRESSURE: 137 MMHG | DIASTOLIC BLOOD PRESSURE: 89 MMHG | HEIGHT: 65 IN

## 2018-07-22 PROBLEM — I16.0 HYPERTENSIVE URGENCY: Status: RESOLVED | Noted: 2018-07-19 | Resolved: 2018-07-22

## 2018-07-22 LAB
ALBUMIN SERPL-MCNC: 2.2 G/DL (ref 3.5–5)
ANION GAP SERPL CALC-SCNC: 9 MMOL/L (ref 5–15)
BASOPHILS # BLD: 0 K/UL (ref 0–0.1)
BASOPHILS NFR BLD: 0 % (ref 0–1)
BUN SERPL-MCNC: 26 MG/DL (ref 6–20)
BUN/CREAT SERPL: 6 (ref 12–20)
CALCIUM SERPL-MCNC: 8.6 MG/DL (ref 8.5–10.1)
CHLORIDE SERPL-SCNC: 100 MMOL/L (ref 97–108)
CO2 SERPL-SCNC: 29 MMOL/L (ref 21–32)
CREAT SERPL-MCNC: 4.67 MG/DL (ref 0.55–1.02)
DIFFERENTIAL METHOD BLD: ABNORMAL
EOSINOPHIL # BLD: 0.1 K/UL (ref 0–0.4)
EOSINOPHIL NFR BLD: 2 % (ref 0–7)
ERYTHROCYTE [DISTWIDTH] IN BLOOD BY AUTOMATED COUNT: 19.3 % (ref 11.5–14.5)
GLUCOSE BLD STRIP.AUTO-MCNC: 164 MG/DL (ref 65–100)
GLUCOSE SERPL-MCNC: 106 MG/DL (ref 65–100)
HCT VFR BLD AUTO: 30.7 % (ref 35–47)
HGB BLD-MCNC: 9 G/DL (ref 11.5–16)
IMM GRANULOCYTES # BLD: 0 K/UL (ref 0–0.04)
IMM GRANULOCYTES NFR BLD AUTO: 1 % (ref 0–0.5)
LYMPHOCYTES # BLD: 0.7 K/UL (ref 0.8–3.5)
LYMPHOCYTES NFR BLD: 20 % (ref 12–49)
MCH RBC QN AUTO: 28.2 PG (ref 26–34)
MCHC RBC AUTO-ENTMCNC: 29.3 G/DL (ref 30–36.5)
MCV RBC AUTO: 96.2 FL (ref 80–99)
MONOCYTES # BLD: 0.2 K/UL (ref 0–1)
MONOCYTES NFR BLD: 5 % (ref 5–13)
NEUTS SEG # BLD: 2.4 K/UL (ref 1.8–8)
NEUTS SEG NFR BLD: 72 % (ref 32–75)
NRBC # BLD: 0.03 K/UL (ref 0–0.01)
NRBC BLD-RTO: 0.9 PER 100 WBC
PHOSPHATE SERPL-MCNC: 3.1 MG/DL (ref 2.6–4.7)
PLATELET # BLD AUTO: 269 K/UL (ref 150–400)
PMV BLD AUTO: 10.3 FL (ref 8.9–12.9)
POTASSIUM SERPL-SCNC: 4.5 MMOL/L (ref 3.5–5.1)
RBC # BLD AUTO: 3.19 M/UL (ref 3.8–5.2)
RBC MORPH BLD: ABNORMAL
SERVICE CMNT-IMP: ABNORMAL
SODIUM SERPL-SCNC: 138 MMOL/L (ref 136–145)
WBC # BLD AUTO: 3.4 K/UL (ref 3.6–11)

## 2018-07-22 PROCEDURE — 74011250637 HC RX REV CODE- 250/637: Performed by: INTERNAL MEDICINE

## 2018-07-22 PROCEDURE — 74011250636 HC RX REV CODE- 250/636: Performed by: FAMILY MEDICINE

## 2018-07-22 PROCEDURE — 74011636637 HC RX REV CODE- 636/637: Performed by: FAMILY MEDICINE

## 2018-07-22 PROCEDURE — 94640 AIRWAY INHALATION TREATMENT: CPT

## 2018-07-22 PROCEDURE — 80069 RENAL FUNCTION PANEL: CPT | Performed by: INTERNAL MEDICINE

## 2018-07-22 PROCEDURE — 85025 COMPLETE CBC W/AUTO DIFF WBC: CPT | Performed by: INTERNAL MEDICINE

## 2018-07-22 PROCEDURE — 74011000250 HC RX REV CODE- 250: Performed by: FAMILY MEDICINE

## 2018-07-22 PROCEDURE — 94760 N-INVAS EAR/PLS OXIMETRY 1: CPT

## 2018-07-22 PROCEDURE — 36415 COLL VENOUS BLD VENIPUNCTURE: CPT | Performed by: INTERNAL MEDICINE

## 2018-07-22 PROCEDURE — 74011250637 HC RX REV CODE- 250/637: Performed by: FAMILY MEDICINE

## 2018-07-22 PROCEDURE — 82962 GLUCOSE BLOOD TEST: CPT

## 2018-07-22 RX ORDER — CLONIDINE HYDROCHLORIDE 0.1 MG/1
0.1 TABLET ORAL 3 TIMES DAILY
Qty: 90 TAB | Refills: 0 | Status: SHIPPED | OUTPATIENT
Start: 2018-07-22 | End: 2018-07-31 | Stop reason: DRUGHIGH

## 2018-07-22 RX ORDER — CARVEDILOL 12.5 MG/1
12.5 TABLET ORAL 2 TIMES DAILY WITH MEALS
Qty: 60 TAB | Refills: 0 | Status: SHIPPED | OUTPATIENT
Start: 2018-07-22 | End: 2018-08-20

## 2018-07-22 RX ORDER — CARVEDILOL 6.25 MG/1
12.5 TABLET ORAL 2 TIMES DAILY WITH MEALS
Qty: 60 TAB | Refills: 0 | Status: SHIPPED | OUTPATIENT
Start: 2018-07-22 | End: 2018-07-22

## 2018-07-22 RX ORDER — AMLODIPINE BESYLATE 5 MG/1
10 TABLET ORAL DAILY
Qty: 30 TAB | Refills: 0 | Status: SHIPPED | OUTPATIENT
Start: 2018-07-22 | End: 2018-07-22

## 2018-07-22 RX ORDER — AMLODIPINE BESYLATE 10 MG/1
10 TABLET ORAL DAILY
Qty: 30 TAB | Refills: 0 | Status: SHIPPED | OUTPATIENT
Start: 2018-07-22 | End: 2020-03-16

## 2018-07-22 RX ADMIN — Medication 10 ML: at 13:41

## 2018-07-22 RX ADMIN — ASPIRIN 81 MG 81 MG: 81 TABLET ORAL at 09:27

## 2018-07-22 RX ADMIN — BUDESONIDE 500 MCG: 0.5 INHALANT RESPIRATORY (INHALATION) at 08:00

## 2018-07-22 RX ADMIN — CLONIDINE HYDROCHLORIDE 0.1 MG: 0.1 TABLET ORAL at 09:28

## 2018-07-22 RX ADMIN — APIXABAN 5 MG: 5 TABLET, FILM COATED ORAL at 09:26

## 2018-07-22 RX ADMIN — ALLOPURINOL 100 MG: 100 TABLET ORAL at 09:26

## 2018-07-22 RX ADMIN — GEMFIBROZIL 300 MG: 600 TABLET ORAL at 09:28

## 2018-07-22 RX ADMIN — AMLODIPINE BESYLATE 10 MG: 5 TABLET ORAL at 09:27

## 2018-07-22 RX ADMIN — Medication 1 MG: at 07:40

## 2018-07-22 RX ADMIN — CARVEDILOL 12.5 MG: 12.5 TABLET, FILM COATED ORAL at 09:26

## 2018-07-22 RX ADMIN — AZATHIOPRINE 50 MG: 50 TABLET ORAL at 09:28

## 2018-07-22 RX ADMIN — Medication 1 MG: at 13:41

## 2018-07-22 RX ADMIN — Medication 10 ML: at 07:40

## 2018-07-22 RX ADMIN — IPRATROPIUM BROMIDE AND ALBUTEROL SULFATE 3 ML: .5; 3 SOLUTION RESPIRATORY (INHALATION) at 08:00

## 2018-07-22 RX ADMIN — HYDROXYCHLOROQUINE SULFATE 200 MG: 200 TABLET, FILM COATED ORAL at 09:27

## 2018-07-22 RX ADMIN — PREDNISONE 10 MG: 5 TABLET ORAL at 09:26

## 2018-07-22 RX ADMIN — LOSARTAN POTASSIUM 50 MG: 50 TABLET ORAL at 09:27

## 2018-07-22 RX ADMIN — PANTOPRAZOLE SODIUM 40 MG: 40 TABLET, DELAYED RELEASE ORAL at 07:40

## 2018-07-22 RX ADMIN — Medication 1 MG: at 00:28

## 2018-07-22 NOTE — DISCHARGE INSTRUCTIONS
Discharge Instructions       PATIENT ID: Shannon Sosa  MRN: [id number]   YOB: 1985    DATE OF ADMISSION: 7/19/2018  6:58 PM    DATE OF DISCHARGE: 7/22/2018    PRIMARY CARE PROVIDER: Nader Sharma NP     ATTENDING PHYSICIAN: Kandace Lee MD  DISCHARGING PROVIDER: Kandace Lee MD    To contact this individual call [phone] and ask the  to page. If unavailable ask to be transferred the Adult Hospitalist Department. DISCHARGE DIAGNOSES   · Hypertensive urgency    CONSULTATIONS: IP CONSULT TO NEPHROLOGY    PROCEDURES/SURGERIES: * No surgery found *    PENDING TEST RESULTS:   At the time of discharge the following test results are still pending: none    FOLLOW UP APPOINTMENTS:   Follow-up Information     Follow up With Details Comments Contact Info    Nader Sharma NP On 7/27/2018 Hospital f/u PCP appointment Friday, 7/27/18 @ 10:30 a.m. [id number] [phone]             ADDITIONAL CARE RECOMMENDATIONS:   · Patient needs repeat imaging of the chest in 1-2 weeks to re-evaluate findings from CT chest done 7/19/18 (specifically, \"small nodular opacities in the lingula which were not present previously. These may be inflammatory. \")   · Follow up for hemodialysis as scheduled    DIET: as previous  Oral Nutritional Supplements: Ensure Clear or Ensure Active Clear Three times daily     ACTIVITY: Activity as tolerated    WOUND CARE: none    EQUIPMENT needed: none      DISCHARGE MEDICATIONS:   See Medication Reconciliation Form    · It is important that you take the medication exactly as they are prescribed. · Keep your medication in the bottles provided by the pharmacist and keep a list of the medication names, dosages, and times to be taken in your wallet. · Do not take other medications without consulting your doctor. NOTIFY YOUR PHYSICIAN FOR ANY OF THE FOLLOWING:   Fever over 101 degrees for 24 hours.    Chest pain, shortness of breath, fever, chills, nausea, vomiting, diarrhea, change in mentation, falling, weakness, bleeding. Severe pain or pain not relieved by medications. Or, any other signs or symptoms that you may have questions about.       DISPOSITION:    Home With: SELF   OT  PT  HH  RN       SNF/Inpatient Rehab/LTAC    Independent/assisted living    Hospice    Other:     CDMP Checked:   Yes x     PROBLEM LIST Updated:  Yes x       Signed:   Peace Marvin MD  7/22/2018  12:38 PM

## 2018-07-22 NOTE — PROGRESS NOTES
Problem: Hypertension  Goal: *Blood pressure within specified parameters  Outcome: Progressing Towards Goal  Assess and monitor the patients blood pressure   Administer hypertension and pain medications   Reassess blood pressure after medications given to ensure therapeutic effect

## 2018-07-22 NOTE — PROGRESS NOTES
Patient seen and examined. Feeling well. No SOB. BPs are better. Tolerated HD yesterday. Plan to d/c home today with PCP follow up. AF, VSS  NAD    D/c home.

## 2018-07-22 NOTE — DISCHARGE SUMMARY
Discharge Summary       PATIENT ID: Duane Carpenter  MRN: [id number]   YOB: 1985    DATE OF ADMISSION: 7/19/2018  6:58 PM    DATE OF DISCHARGE: 7/22/18   PRIMARY CARE PROVIDER: Sean Leonardo NP     ATTENDING PHYSICIAN: Dr. Brenda Fulton  DISCHARGING PROVIDER: Arcelia Berg MD    To contact this individual call [phone] and ask the  to page. If unavailable ask to be transferred the Adult Hospitalist Department. CONSULTATIONS: IP CONSULT TO NEPHROLOGY    PROCEDURES/SURGERIES: * No surgery found *    ADMITTING DIAGNOSES & HOSPITAL COURSE:     1. Hypertensive urgency - better with PO clonidine, Coreg and Norvasc; also improved following HD  2. ESRD - followed by renal and had HD x 1 in house  3. SLE - continued on current regimen  4. H/o DVT - continued on Eliquis  5. Dyspnea - improved with treatment of the HTN - follow  6. Headache - better with treatment of the HTN  7. Recent PNA with parapneumonic effusion - CT of the chest done 7/19 was mostly unchanged, but there was note made of \"small nodular opacities in the lingula which were not present previously. These may be inflammatory. Follow-up of this suggested. \" - she was d/c'ed with instructions to have imaging repeated in a couple of weeks      PENDING TEST RESULTS:   At the time of discharge the following test results are still pending: none    FOLLOW UP APPOINTMENTS:    Follow-up Information     Follow up With Details Comments [address], NP On 7/27/2018 Hospital f/u PCP appointment Friday, 7/27/18 @ 10:30 a.m. [address] [id number]             ADDITIONAL CARE RECOMMENDATIONS:   · Needs repeat imaging of the chest in 1-2 weeks  · Continue hemodialysis as scheduled    DIET: as previous    ACTIVITY: Activity as tolerated    WOUND CARE: none    EQUIPMENT needed: none      DISCHARGE MEDICATIONS:  Current Discharge Medication List      CONTINUE these medications which have CHANGED    Details   carvedilol (COREG) 12.5 mg tablet Take 1 Tab by mouth two (2) times daily (with meals). Qty: 60 Tab, Refills: 0      amLODIPine (NORVASC) 10 mg tablet Take 1 Tab by mouth daily. Qty: 30 Tab, Refills: 0         CONTINUE these medications which have NOT CHANGED    Details   apixaban (ELIQUIS) 5 mg tablet Take 5 mg by mouth two (2) times a day. Indications: pulmonary thromboembolism      polyethylene glycol (MIRALAX) 17 gram packet Take 17 g by mouth daily. oxyCODONE-acetaminophen (PERCOCET) 5-325 mg per tablet Take 1 Tab by mouth two (2) times daily as needed for Pain (severe back pain). Max Daily Amount: 2 Tabs. Qty: 60 Tab, Refills: 0    Associated Diagnoses: Chronic bilateral low back pain without sciatica; Rib pain on right side      predniSONE (DELTASONE) 5 mg tablet Take 2 Tabs by mouth daily. Qty: 30 Tab, Refills: 0      losartan (COZAAR) 50 mg tablet Take 1 Tab by mouth daily for 30 days. Qty: 30 Tab, Refills: 0      cloNIDine HCl (CATAPRES) 0.1 mg tablet Take 1 Tab by mouth three (3) times daily for 30 days. Qty: 90 Tab, Refills: 0      senna (SENNA) 8.6 mg tablet Take 1 Tab by mouth daily as needed for Constipation. for constipation      amitriptyline (ELAVIL) 25 mg tablet Take 25 mg by mouth nightly. fluticasone-vilanterol (BREO ELLIPTA) 200-25 mcg/dose inhaler Take 1 Puff by inhalation daily. Rinse mouth out after use  Qty: 1 Inhaler, Refills: 5    Associated Diagnoses: Acute bronchiolitis with bronchospasm      gemfibrozil (LOPID) 600 mg tablet Take 300 mg by mouth daily. azaTHIOprine (IMURAN) 50 mg tablet Take 50 mg by mouth daily (after breakfast). albuterol (PROVENTIL HFA, VENTOLIN HFA, PROAIR HFA) 90 mcg/actuation inhaler Take 1-2 Puffs by inhalation every four (4) hours as needed for Wheezing or Shortness of Breath. Qty: 1 Inhaler, Refills: 2      hydroxychloroquine (PLAQUENIL) 200 mg tablet Take 200 mg by mouth two (2) times a day. allopurinol (ZYLOPRIM) 100 mg tablet Take 100 mg by mouth daily (after breakfast). Needs to TAKE on a full stomach; will cause her to be nauseated. pantoprazole (PROTONIX) 40 mg tablet Take 1 Tab by mouth Daily (before breakfast). Qty: 30 Tab, Refills: 0         STOP taking these medications       HYDROmorphone (DILAUDID) 4 mg tablet Comments:   Reason for Stopping:                 NOTIFY YOUR PHYSICIAN FOR ANY OF THE FOLLOWING:   Fever over 101 degrees for 24 hours. Chest pain, shortness of breath, fever, chills, nausea, vomiting, diarrhea, change in mentation, falling, weakness, bleeding. Severe pain or pain not relieved by medications. Or, any other signs or symptoms that you may have questions about.     DISPOSITION:    Home With: SELF   OT  PT  HH  RN       Long term SNF/Inpatient Rehab    Independent/assisted living    Hospice    Other:       PATIENT CONDITION AT DISCHARGE:     Functional status    Poor     Deconditioned    x Independent      Cognition    x Lucid     Forgetful     Dementia      Catheters/lines (plus indication)    Franco     PICC     PEG    x None      Code status    x Full code     DNR      PHYSICAL EXAMINATION AT DISCHARGE:   Refer to Progress Note      CHRONIC MEDICAL DIAGNOSES:  Problem List as of 7/22/2018  Date Reviewed: 7/22/2018          Codes Class Noted - Resolved    Rib pain on right side ICD-10-CM: R07.81  ICD-9-CM: 786.50  7/2/2018 - Present        Troponin level elevated ICD-10-CM: R74.8  ICD-9-CM: 790.6  6/17/2018 - Present        Intra-abdominal abscess ([address] ICD-10-CM: K65.1  ICD-9-CM: 567.22  5/12/2018 - Present        Sepsis ([address] ICD-10-CM: A41.9  ICD-9-CM: 038.9, 995.91  4/29/2018 - Present        Generalized abdominal pain ICD-10-CM: R10.84  ICD-9-CM: 789.07  4/4/2018 - Present        Other constipation ICD-10-CM: K59.09  ICD-9-CM: 564.09  4/4/2018 - Present        Other ascites ICD-10-CM: R18.8  ICD-9-CM: 789.59  4/4/2018 - Present        Peritonitis ([address] ICD-10-CM: K65.9  ICD-9-CM: 567.9  3/11/2018 - Present        History of pulmonary embolism ICD-10-CM: P36.341  ICD-9-CM: V12.55  12/8/2017 - Present        Hypomagnesemia ICD-10-CM: E83.42  ICD-9-CM: 275.2  10/30/2017 - Present        Hypocalcemia ICD-10-CM: E83.51  ICD-9-CM: 275.41  10/30/2017 - Present        Hypokalemia ICD-10-CM: E87.6  ICD-9-CM: 276.8  10/27/2017 - Present        Hypertension (Chronic) ICD-10-CM: I10  ICD-9-CM: 401.9  10/14/2017 - Present        Chronic bilateral low back pain without sciatica ICD-10-CM: M54.5, G89.29  ICD-9-CM: 724.2, 338.29  5/26/2017 - Present        Anemia of renal disease ICD-10-CM: D63.1  ICD-9-CM: 285.21  2/21/2017 - Present        Dependence on peritoneal dialysis [facility]) ICD-10-CM: Z99.2  ICD-9-CM: V45.11  2/21/2017 - Present        ACP (advance care planning) ICD-10-CM: Z71.89  ICD-9-CM: V65.49  2/21/2017 - Present        ESRD on peritoneal dialysis [facility]) (Chronic) ICD-10-CM: N18.6, Z99.2  ICD-9-CM: 585.6, V45.11  10/7/2016 - Present        Malignant hypertension ICD-10-CM: I10  ICD-9-CM: 401.0  7/11/2016 - Present        Encounter for monitoring opioid maintenance therapy ICD-10-CM: Z51.81, Z79.891  ICD-9-CM: V58.83, V58.69  11/3/2015 - Present        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  ICD-9-CM: 710.0, 583.81  3/10/2012 - Present        Lupus ICD-10-CM: L93.0  ICD-9-CM: 695.4  2/27/2012 - Present        RESOLVED: Hypertensive urgency ICD-10-CM: I16.0  ICD-9-CM: 401.9  7/19/2018 - 7/22/2018        RESOLVED: Sepsis ([facility] ICD-10-CM: A41.9  ICD-9-CM: 038.9, 995.91  12/12/2017 - 12/22/2017        RESOLVED: Pneumonia ICD-10-CM: J18.9  ICD-9-CM: 185  10/14/2017 - 12/8/2017        RESOLVED: Pleural effusion, left ICD-10-CM: J90  ICD-9-CM: 511.9  10/14/2017 - 12/8/2017        RESOLVED: Idiopathic chronic inflammatory bowel disease ICD-10-CM: H30.78  ICD-9-CM: 558.9  8/28/2013 - 8/28/2013        RESOLVED: Abdominal pain ICD-10-CM: R10.9  ICD-9-CM: 789.00  8/28/2013 - 9/3/2013        RESOLVED: Hypoxia ICD-10-CM: R09.02  ICD-9-CM: 799.02  4/25/2013 - 4/30/2013        RESOLVED: Lupus nephritis ([facility] ICD-10-CM: M32.14  ICD-9-CM: 710.0, 583.81  4/27/2012 - 4/28/2012              Greater than 30 minutes were spent with the patient on counseling and coordination of care    Signed:   Elan Wagner MD  7/22/2018  12:42 PM

## 2018-07-22 NOTE — PROGRESS NOTES
Spiritual Care Partner Volunteer visited patient in Rm 423 on 7/22/18.   Documented by:  Chaplain Moore MDiv, MS, [facility]  [phone] ([phone])

## 2018-07-22 NOTE — PROGRESS NOTES
Problem: Hypertension  Goal: *Blood pressure within specified parameters  Outcome: Progressing Towards Goal  Patient's BP stable throughout shift, SBP <140s. Problem: Falls - Risk of  Goal: *Absence of Falls  Document Alex Fall Risk and appropriate interventions in the flowsheet. Outcome: Progressing Towards Goal  Patient ambulates with standby assistance. Belongings and call bell within reach. Nonskid footwear in use. Bed wheels locked and in low position. Weight discrepancy of -4lbs noted. Patient received dialysis yesterday. 0800 - Bedside shift change report given to Norah Wick (oncoming nurse) by Lyly Womack (offgoing nurse). Report included the following information SBAR, Kardex, Intake/Output, MAR, Recent Results and Cardiac Rhythm NSR.

## 2018-07-23 ENCOUNTER — PATIENT OUTREACH (OUTPATIENT)
Dept: FAMILY MEDICINE CLINIC | Age: 33
End: 2018-07-23

## 2018-07-23 ENCOUNTER — TELEPHONE (OUTPATIENT)
Dept: SURGERY | Age: 33
End: 2018-07-23

## 2018-07-23 LAB
BACTERIA SPEC CULT: NORMAL
SERVICE CMNT-IMP: NORMAL

## 2018-07-24 LAB
BACTERIA SPEC CULT: NORMAL
SERVICE CMNT-IMP: NORMAL

## 2018-07-24 NOTE — PROGRESS NOTES
.  Hospital Discharge Follow-Up      Date/Time:  2018 8:25 AM    Patient was admitted to [facility] on 18 and discharged on 18 for Hypertensive urgency . The physician discharge summary was available at the time of outreach. Patient was contacted within 1 business days of discharge. Top Challenges reviewed with the provider   -recent PNA- continue to complain of breathing problems, CT on  showed small nodular opacities in lingula which were not present previously-consider repeat imaging  -consider repeat labs-Troponin 0.13  BNP 30, 000 on admission  -/127 on admission- 137/89 at discharge         Method of communication with provider : chart routing, phone, staff message    Inpatient RRAT score: undetermined, patient is high risk for readmission  Was this a readmission? yes   Patient stated reason for the readmission: headache and continued SOB    Nurse Navigator (NN) contacted the patient by telephone to perform post hospital discharge assessment. Verified name and  with patient as identifiers. Provided introduction to self, and explanation of the Nurse Navigator role. Reviewed discharge instructions and red flags with patient who verbalized understanding. Patient given an opportunity to ask questions and does not have any further questions or concerns at this time. The patient agrees to contact the PCP office for questions related to their healthcare. NN provided contact information for future reference. Disease Specific:   Hypertension Urgency    Summary of patient's top problems:  1. HTN- /127 on admission- 137/89 at discharge, better with clonidine, Coreg, Norvasc and HD  2.  ESRD- HD x1 while admitted, back to [facility], Sat schedule    Home Health orders at discharge: None, patient says she is doing much better at walking around, decline [address]: n/a  Date of initial visit: [address] ordered/company: none  Durable Medical Equipment received: none    Barriers to care? None verbalized, patient has support of family    Advance Care Planning:   Does patient have an Advance Directive:  not on file, discussed with patient, patient states her mother will make those decisions. Medication(s):   New Medications at Discharge: none  Changed Medications at Discharge: Coreg, Norvasc  Discontinued Medications at Discharge: Dilaudid    Medication reconciliation was performed with patient, who verbalizes understanding of administration of home medications. There were no barriers to obtaining medications identified at this time. Referral to Pharm D needed: no     Current Outpatient Prescriptions   Medication Sig    carvedilol (COREG) 12.5 mg tablet Take 1 Tab by mouth two (2) times daily (with meals).  amLODIPine (NORVASC) 10 mg tablet Take 1 Tab by mouth daily.  cloNIDine HCl (CATAPRES) 0.1 mg tablet Take 1 Tab by mouth three (3) times daily for 30 days.  apixaban (ELIQUIS) 5 mg tablet Take 5 mg by mouth two (2) times a day. Indications: pulmonary thromboembolism    polyethylene glycol (MIRALAX) 17 gram packet Take 17 g by mouth daily.  oxyCODONE-acetaminophen (PERCOCET) 5-325 mg per tablet Take 1 Tab by mouth two (2) times daily as needed for Pain (severe back pain). Max Daily Amount: 2 Tabs.  predniSONE (DELTASONE) 5 mg tablet Take 2 Tabs by mouth daily.  losartan (COZAAR) 50 mg tablet Take 1 Tab by mouth daily for 30 days.  senna (SENNA) 8.6 mg tablet Take 1 Tab by mouth daily as needed for Constipation. for constipation    amitriptyline (ELAVIL) 25 mg tablet Take 25 mg by mouth nightly.  fluticasone-vilanterol (BREO ELLIPTA) 200-25 mcg/dose inhaler Take 1 Puff by inhalation daily. Rinse mouth out after use    gemfibrozil (LOPID) 600 mg tablet Take 300 mg by mouth daily.  azaTHIOprine (IMURAN) 50 mg tablet Take 50 mg by mouth daily (after breakfast).     albuterol (PROVENTIL HFA, VENTOLIN HFA, PROAIR HFA) 90 mcg/actuation inhaler Take 1-2 Puffs by inhalation every four (4) hours as needed for Wheezing or Shortness of Breath.  hydroxychloroquine (PLAQUENIL) 200 mg tablet Take 200 mg by mouth two (2) times a day.  allopurinol (ZYLOPRIM) 100 mg tablet Take 100 mg by mouth daily (after breakfast). Needs to TAKE on a full stomach; will cause her to be nauseated.  pantoprazole (PROTONIX) 40 mg tablet Take 1 Tab by mouth Daily (before breakfast). No current facility-administered medications for this visit. There are no discontinued medications. [facility] follow up appointment(s): Future Appointments  Date Time Provider Rocio Hardy   7/27/2018 10:30 AM Sean Leonardo NP [facility] SCHED   7/30/2018 2:20 PM Wiliam Patterson  [address]   7/30/2018 3:00 PM Gerald Gutierrez MD [address]   12/21/2018 1:00 PM [facility] [address] [facility]   12/21/2018 2:00 PM Wiliam Patterson  [address]       Non-[facility] follow up appointment(s): none  [facility]:  n/a       Goals        Patient [address] (pt-stated)            10/19/17- NN did not discuss this with patient. Will plan to discuss with patient during upcoming call. Sc  4/23/18- NN encouraged patient to complete when she comes for her next follow-up visit. Patient verbalized understanding and will schedule. pk  5/21/18- Patient remains full code. Stating Mother is her legal next of kin, will make decisions for her. pk    07/23/18   · Patient remains full code, says mother will make ACP decisions, not interested in completing form at this time.  Patient/Family verbalizes understanding of self-management of  disease. (pt-stated)            11/29/17- Patient verbalized understanding of hypokalemia and treatment plan was review with patient PK    How can you care for yourself at home? · If your doctor recommends it, eat foods that have a lot of potassium.  These include fresh fruits, juices, and vegetables. They also include nuts, beans, and milk. · Be safe with medicines. If your doctor prescribes medicines or potassium supplements, take them exactly as directed. Call your doctor if you have any problems with your medicines. · Get your potassium levels tested as often as your doctor tells you.    4/25/18- Patient verbalized understanding of plan of care, attending dialysis and appointments as scheduled. [facility] pk    4/30/18-   · Patient educated on diet restrictions at discharge,  · NN will continue attempts to reach for follow-up assessment and educate on diet restrictions. · Patient attending dialysis at this time on 5/1/18. pk  ·   07/23/18   · H-Dialysis continure on TTH and Sat, Patient verbalized understanding pk  · Patient verbalized decline for home care at this time. pk    5/21/18- discharge instructions and medication reconciliation reviewed. Patient verbalized understanding of plan of care. pk  6/29/18 Reviewed discharge instructions and medications with patient. Reminded to call Sulma Morales for f/u appt per hospital rec. Also to schedule f/u with surgeon, , for 2 weeks, and Dr.Deep Feliz(neph) for one week. NN sched SURJIT with pcp, will see  on 7/2 at 11am.mbt    07/23/18   · Verbalized understanding of disease process, when and how to call for help. · NN reviewed discharge instructions and ask patient to log her BPs in log book to bring to office. · NN will follow up in one week. pk         Prevention of infection (pt-stated)            10/19/17- patient will assess/closely monitor her temperatures  and report fevers greater than 101. Patient will assess hydration status by consuming at least six cups of water to avoid dehydration. Patient will take Levaquin as prescribed. sc  10/31/17- NN instructed patient on importance of taking all medications as ordered, follow-ups as scheduled- PK    Call your doctor now or seek immediate medical care if:  ? · You cough up dark brown or bloody mucus (sputum). ? · You have new or worse trouble breathing. ? · You are dizzy or lightheaded, or you feel like you may faint. ? Watch closely for changes in your health, and be sure to contact your doctor if:  ? · You have a new or higher fever. ? · You are coughing more deeply or more often. ? · You are not getting better after 2 days (48 hours). 6/29/18 ? · You do not get better as expected. [facility] 6/16-6/28 sepsis/pneumonia. Reviewed signs/symptoms above to watch for and notify NN/provider. Scheduled SURJIT for 7/2 11am with . And reminded of importance of this visit. (reminded she has appt at 1pm with neuro). mbt             4/23/18- Discharge instructions, appointments and medications reviewed. Patient verbalized understanding. pk    5/1/18- discharged on Levaquin 500mg every 48 hrs,take after Dialysis   5/21/18- Patient was discharged on Augmentin for 7 days, started on 5/18-5/25/18. Home with drainage bag, instructed on flushing and care.   pk

## 2018-07-26 ENCOUNTER — HOSPITAL ENCOUNTER (EMERGENCY)
Age: 33
Discharge: HOME | End: 2018-07-26
Attending: STUDENT IN AN ORGANIZED HEALTH CARE EDUCATION/TRAINING PROGRAM
Payer: MEDICARE

## 2018-07-26 ENCOUNTER — APPOINTMENT (OUTPATIENT)
Dept: GENERAL RADIOLOGY | Age: 33
End: 2018-07-26
Attending: STUDENT IN AN ORGANIZED HEALTH CARE EDUCATION/TRAINING PROGRAM
Payer: MEDICARE

## 2018-07-26 VITALS
OXYGEN SATURATION: 96 % | HEART RATE: 84 BPM | RESPIRATION RATE: 19 BRPM | TEMPERATURE: 99 F | SYSTOLIC BLOOD PRESSURE: 154 MMHG | DIASTOLIC BLOOD PRESSURE: 107 MMHG

## 2018-07-26 DIAGNOSIS — R06.02 SHORTNESS OF BREATH: Primary | ICD-10-CM

## 2018-07-26 LAB
ALBUMIN SERPL-MCNC: 2.7 G/DL (ref 3.5–5)
ALBUMIN/GLOB SERPL: 0.5 {RATIO} (ref 1.1–2.2)
ALP SERPL-CCNC: 106 U/L (ref 45–117)
ALT SERPL-CCNC: 10 U/L (ref 12–78)
ANION GAP SERPL CALC-SCNC: 7 MMOL/L (ref 5–15)
AST SERPL-CCNC: 23 U/L (ref 15–37)
ATRIAL RATE: 85 BPM
BASOPHILS # BLD: 0 K/UL (ref 0–0.1)
BASOPHILS NFR BLD: 0 % (ref 0–1)
BILIRUB SERPL-MCNC: 0.6 MG/DL (ref 0.2–1)
BUN SERPL-MCNC: 26 MG/DL (ref 6–20)
BUN/CREAT SERPL: 7 (ref 12–20)
CALCIUM SERPL-MCNC: 9.1 MG/DL (ref 8.5–10.1)
CALCULATED P AXIS, ECG09: 43 DEGREES
CALCULATED R AXIS, ECG10: 29 DEGREES
CALCULATED T AXIS, ECG11: 13 DEGREES
CHLORIDE SERPL-SCNC: 97 MMOL/L (ref 97–108)
CK SERPL-CCNC: 18 U/L (ref 26–192)
CO2 SERPL-SCNC: 34 MMOL/L (ref 21–32)
CREAT SERPL-MCNC: 3.71 MG/DL (ref 0.55–1.02)
DIAGNOSIS, 93000: NORMAL
DIFFERENTIAL METHOD BLD: ABNORMAL
EOSINOPHIL # BLD: 0 K/UL (ref 0–0.4)
EOSINOPHIL NFR BLD: 0 % (ref 0–7)
ERYTHROCYTE [DISTWIDTH] IN BLOOD BY AUTOMATED COUNT: 19.4 % (ref 11.5–14.5)
GLOBULIN SER CALC-MCNC: 5.4 G/DL (ref 2–4)
GLUCOSE SERPL-MCNC: 101 MG/DL (ref 65–100)
HCT VFR BLD AUTO: 32.8 % (ref 35–47)
HGB BLD-MCNC: 9.9 G/DL (ref 11.5–16)
IMM GRANULOCYTES # BLD: 0.1 K/UL (ref 0–0.04)
IMM GRANULOCYTES NFR BLD AUTO: 1 % (ref 0–0.5)
LYMPHOCYTES # BLD: 0.4 K/UL (ref 0.8–3.5)
LYMPHOCYTES NFR BLD: 6 % (ref 12–49)
MCH RBC QN AUTO: 29 PG (ref 26–34)
MCHC RBC AUTO-ENTMCNC: 30.2 G/DL (ref 30–36.5)
MCV RBC AUTO: 96.2 FL (ref 80–99)
MONOCYTES # BLD: 0.2 K/UL (ref 0–1)
MONOCYTES NFR BLD: 3 % (ref 5–13)
NEUTS SEG # BLD: 5.7 K/UL (ref 1.8–8)
NEUTS SEG NFR BLD: 90 % (ref 32–75)
NRBC # BLD: 0 K/UL (ref 0–0.01)
NRBC BLD-RTO: 0 PER 100 WBC
P-R INTERVAL, ECG05: 146 MS
PLATELET # BLD AUTO: 238 K/UL (ref 150–400)
PMV BLD AUTO: 9.9 FL (ref 8.9–12.9)
POTASSIUM SERPL-SCNC: 4.4 MMOL/L (ref 3.5–5.1)
PROT SERPL-MCNC: 8.1 G/DL (ref 6.4–8.2)
Q-T INTERVAL, ECG07: 410 MS
QRS DURATION, ECG06: 76 MS
QTC CALCULATION (BEZET), ECG08: 487 MS
RBC # BLD AUTO: 3.41 M/UL (ref 3.8–5.2)
RBC MORPH BLD: ABNORMAL
SODIUM SERPL-SCNC: 138 MMOL/L (ref 136–145)
TROPONIN I SERPL-MCNC: <0.05 NG/ML
VENTRICULAR RATE, ECG03: 85 BPM
WBC # BLD AUTO: 6.4 K/UL (ref 3.6–11)

## 2018-07-26 PROCEDURE — 84484 ASSAY OF TROPONIN QUANT: CPT | Performed by: STUDENT IN AN ORGANIZED HEALTH CARE EDUCATION/TRAINING PROGRAM

## 2018-07-26 PROCEDURE — 85025 COMPLETE CBC W/AUTO DIFF WBC: CPT | Performed by: STUDENT IN AN ORGANIZED HEALTH CARE EDUCATION/TRAINING PROGRAM

## 2018-07-26 PROCEDURE — 96374 THER/PROPH/DIAG INJ IV PUSH: CPT

## 2018-07-26 PROCEDURE — 82550 ASSAY OF CK (CPK): CPT | Performed by: STUDENT IN AN ORGANIZED HEALTH CARE EDUCATION/TRAINING PROGRAM

## 2018-07-26 PROCEDURE — 74011250636 HC RX REV CODE- 250/636: Performed by: STUDENT IN AN ORGANIZED HEALTH CARE EDUCATION/TRAINING PROGRAM

## 2018-07-26 PROCEDURE — 36415 COLL VENOUS BLD VENIPUNCTURE: CPT | Performed by: STUDENT IN AN ORGANIZED HEALTH CARE EDUCATION/TRAINING PROGRAM

## 2018-07-26 PROCEDURE — 71046 X-RAY EXAM CHEST 2 VIEWS: CPT

## 2018-07-26 PROCEDURE — 99284 EMERGENCY DEPT VISIT MOD MDM: CPT

## 2018-07-26 PROCEDURE — 80053 COMPREHEN METABOLIC PANEL: CPT | Performed by: STUDENT IN AN ORGANIZED HEALTH CARE EDUCATION/TRAINING PROGRAM

## 2018-07-26 PROCEDURE — 93005 ELECTROCARDIOGRAM TRACING: CPT

## 2018-07-26 PROCEDURE — 74011250637 HC RX REV CODE- 250/637: Performed by: STUDENT IN AN ORGANIZED HEALTH CARE EDUCATION/TRAINING PROGRAM

## 2018-07-26 RX ORDER — HYDROMORPHONE HYDROCHLORIDE 1 MG/ML
1 INJECTION, SOLUTION INTRAMUSCULAR; INTRAVENOUS; SUBCUTANEOUS ONCE
Status: COMPLETED | OUTPATIENT
Start: 2018-07-26 | End: 2018-07-26

## 2018-07-26 RX ORDER — BUTALBITAL, ACETAMINOPHEN AND CAFFEINE 50; 325; 40 MG/1; MG/1; MG/1
1 TABLET ORAL ONCE
Status: COMPLETED | OUTPATIENT
Start: 2018-07-26 | End: 2018-07-26

## 2018-07-26 RX ADMIN — HYDROMORPHONE HYDROCHLORIDE 1 MG: 1 INJECTION, SOLUTION INTRAMUSCULAR; INTRAVENOUS; SUBCUTANEOUS at 16:12

## 2018-07-26 RX ADMIN — BUTALBITAL, ACETAMINOPHEN AND CAFFEINE 1 TABLET: 50; 325; 40 TABLET ORAL at 17:47

## 2018-07-26 NOTE — ED NOTES
Discharge instructions given to patient by MD and nurse. Pt has been given counseling on medication use and verbalizes understanding. IV d/c. Pt ambulated off of unit in no signs of distress.  MD aware of BP, MD states this is the pts normal.

## 2018-07-26 NOTE — PROGRESS NOTES
Date of previous inpatient admission/ ED visit? Patient last admitted 7/19-7/22 for Hypertensive Urgency. Patient with 2 ED visits within the past 6 months and 8 IP admissions within the past 12 months. What brought the patient back to ED? Chart reviewed. Patient arrives via EMS from dialysis with c/o SOB and episode of low oxygen saturations. Patient denies SOB at this time/CP. Patient did complete her dialysis which she receives T, Th, Sat. Patient arrives in NAD, 96% RA. Did patient decline recommended services during last admission/ ED visit (if yes, what)? NO  Has patient seen a provider since their last inpatient admission/ED visit (if yes, when)? YES, dialysis provider today and was contacted by RN Navigator for patient outreach on 7/23/18. CM Interventions:  From previous inpatient admission/ED visit:  Patient discharged home with outpatient services and family assistance. PCP follow-up was scheduled for 7/27/18. Patient discharged home with family and personal CG's who assist with ADL's and IADL's. Family transported patient at discharge. From current inpatient admission/ED visit:  Patient in the ED being evaluated and needs assessed. Disposition needs TBD/sbject to change pending recommendations. CM will continue to follow and assist with disposition needs as they arise.     DERRELL Coats/DANYEL  3:56 PM

## 2018-07-26 NOTE — DISCHARGE INSTRUCTIONS
Shortness of Breath: Care Instructions  Your Care Instructions  Shortness of breath has many causes. Sometimes conditions such as anxiety can lead to shortness of breath. Some people get mild shortness of breath when they exercise. Trouble breathing also can be a symptom of a serious problem, such as asthma, lung disease, emphysema, heart problems, and pneumonia. If your shortness of breath continues, you may need tests and treatment. Watch for any changes in your breathing and other symptoms. Follow-up care is a key part of your treatment and safety. Be sure to make and go to all appointments, and call your doctor if you are having problems. It's also a good idea to know your test results and keep a list of the medicines you take. How can you care for yourself at home? · Do not smoke or allow others to smoke around you. If you need help quitting, talk to your doctor about stop-smoking programs and medicines. These can increase your chances of quitting for good. · Get plenty of rest and sleep. · Take your medicines exactly as prescribed. Call your doctor if you think you are having a problem with your medicine. · Find healthy ways to deal with stress. ¨ Exercise daily. ¨ Get plenty of sleep. ¨ Eat regularly and well. When should you call for help? Call 911 anytime you think you may need emergency care. For example, call if:    · You have severe shortness of breath.     · You have symptoms of a heart attack. These may include:  ¨ Chest pain or pressure, or a strange feeling in the chest.  ¨ Sweating. ¨ Shortness of breath. ¨ Nausea or vomiting. ¨ Pain, pressure, or a strange feeling in the back, neck, jaw, or upper belly or in one or both shoulders or arms. ¨ Lightheadedness or sudden weakness. ¨ A fast or irregular heartbeat. After you call 911, the  may tell you to chew 1 adult-strength or 2 to 4 low-dose aspirin. Wait for an ambulance.  Do not try to drive yourself.    Call your doctor now or seek immediate medical care if:    · Your shortness of breath gets worse or you start to wheeze. Wheezing is a high-pitched sound when you breathe.     · You wake up at night out of breath or have to prop your head up on several pillows to breathe.     · You are short of breath after only light activity or while at rest.    Watch closely for changes in your health, and be sure to contact your doctor if:    · You do not get better over the next 1 to 2 days. Where can you learn more? Go to http://jorge-rochelle.info/. Enter S780 in the search box to learn more about \"Shortness of Breath: Care Instructions. \"  Current as of: December 6, 2017  Content Version: 11.7  © 2087-9515 [employer]. Care instructions adapted under license by [employer] (which disclaims liability or warranty for this information). If you have questions about a medical condition or this instruction, always ask your healthcare professional. [address] any warranty or liability for your use of this information.

## 2018-07-26 NOTE — ED PROVIDER NOTES
HPI Comments: 29 y.o. female with past medical history significant for Lupus, Anemia, Carditis, Hypercholesterolemia, Asthma, ESRD, DDD, Thromboembolus, GERD, Hypertension, and CKD who presents via EMS with chief complaint of SOB. Patient was receiving dialysis prior to arrival when she began to experience SOB with associated episode of low O2 sat. Patient notes completing her dialysis which she receives every T, Th, and Sat. Patient presents to [facility] ED following the episode of SOB and low O2 sat for further evaluation. Upon examination at [facility] ED, patient reports improvement of SOB and O2 sats have improved to 96%. Pt denies fever, chills, cough, congestion, chest pain, abdominal pain, nausea, vomiting, diarrhea, difficulty with urination or dysuria. There are no other acute medical concerns at this time. PCP: Joce Mckeon NP    Note written by Boy Leal, as dictated by Sohail Guaman MD 5:22 PM      The history is provided by the patient. Past Medical History:   Diagnosis Date    Anemia     secondary to lupus    Asthma     no inhaler use in past 2 to 3 years    Carditis     Chronic kidney disease     ESRD    Chronic pain     DDD (degenerative disc disease), lumbar     ESRD (end stage renal disease) (HCC)     GERD (gastroesophageal reflux disease)     Hemodialysis patient ([facility] [address] 12/21/2017    [address]  Tuesday,  Thursday,  and Saturday.  Hypercholesterolemia     Hypertension     Intractable nausea and vomiting 10/21/2015    Long term (current) use of anticoagulants     Lupus (systemic lupus erythematosus) (HCC)     Malignant hypertension with chronic kidney disease stage V ([address]     Peritoneal dialysis status ([facility] [address] 10/2015    x 2 years Stopped 12/2017 due to infection and removed.     Poor historian 01/17/2018    With medications    Thromboembolus [facility]) 2013    lungs    Transfusion history     Last Transfusion 12/21/2017  at [facility]       Past Surgical History:   Procedure Laterality Date    HX  SECTION  11/2006    x1    HX OTHER SURGICAL  9/16/15    INSERTION PD CATH; Removed 2017    HX VASCULAR ACCESS Right 2017    Double-Lumen henry catheter upper chest         Family History:   Problem Relation Age of Onset    Diabetes Father     Hypertension Father     Cancer Other      aunt with breast cancer    Diabetes Mother        Social History     Social History    Marital status: SINGLE     Spouse name: N/A    Number of children: N/A    Years of education: N/A     Occupational History    Not on file. Social History Main Topics    Smoking status: Never Smoker    Smokeless tobacco: Never Used    Alcohol use No    Drug use: Yes     Special: Prescription, OTC    Sexual activity: Not Currently     Other Topics Concern     Service No    Blood Transfusions No    Caffeine Concern No    Occupational Exposure No    Hobby Hazards No    Sleep Concern No    Stress Concern No    Weight Concern No    Special Diet No    Back Care Yes     low back pain    Exercise No    Bike Helmet No    Seat Belt Yes    Self-Exams No     Social History Narrative    Lives with parents and daughter. ALLERGIES: Compazine [prochlorperazine edisylate]    Review of Systems   Constitutional: Negative for activity change, diaphoresis, fatigue and fever. HENT: Negative for congestion and sore throat. Eyes: Negative for photophobia and visual disturbance. Respiratory: Positive for shortness of breath. Negative for cough and chest tightness. Cardiovascular: Negative for chest pain, palpitations and leg swelling. Gastrointestinal: Negative for abdominal pain, blood in stool, constipation, diarrhea, nausea and vomiting. Genitourinary: Negative for difficulty urinating, dysuria, flank pain, frequency and hematuria. Musculoskeletal: Negative for back pain.    Neurological: Negative for dizziness, syncope, numbness and headaches. All other systems reviewed and are negative. Vitals:    07/26/18 1521   BP: (!) 139/95   Pulse: 83   Resp: 18   Temp: 98.8 °F (37.1 °C)   SpO2: 96%            Physical Exam   Constitutional: She is oriented to person, place, and time. She appears well-developed and well-nourished. No distress. Chronically ill appearing    HENT:   Head: Normocephalic and atraumatic. Nose: Nose normal.   Mouth/Throat: Oropharynx is clear and moist. No oropharyngeal exudate. Eyes: Conjunctivae and EOM are normal. Right eye exhibits no discharge. Left eye exhibits no discharge. No scleral icterus. Neck: Normal range of motion. Neck supple. No JVD present. No tracheal deviation present. No thyromegaly present. Cardiovascular: Normal rate, regular rhythm, normal heart sounds and intact distal pulses. Exam reveals no gallop and no friction rub. No murmur heard. Pulmonary/Chest: Effort normal and breath sounds normal. No stridor. No respiratory distress. She has no wheezes. She has no rales. She exhibits no tenderness. Abdominal: Bowel sounds are normal. She exhibits no distension and no mass. There is no tenderness. There is no rebound. Musculoskeletal: Normal range of motion. She exhibits no edema or tenderness. Lymphadenopathy:     She has no cervical adenopathy. Neurological: She is alert and oriented to person, place, and time. No cranial nerve deficit. Coordination normal.   Skin: Skin is warm and dry. No rash noted. She is not diaphoretic. No erythema. No pallor. Healing right sided chest tube site that is tender to touch   Psychiatric: She has a normal mood and affect. Her behavior is normal. Judgment and thought content normal.   Nursing note and vitals reviewed.   Note written by Boy Taylor, as dictated by Ernestine Feldman MD 4:46 PM        MDM  Number of Diagnoses or Management Options  Shortness of breath:      Amount and/or Complexity of Data Reviewed  Clinical lab tests: reviewed and ordered  Tests in the radiology section of CPT®: reviewed and ordered  Review and summarize past medical records: yes  Independent visualization of images, tracings, or specimens: yes    Risk of Complications, Morbidity, and/or Mortality  Presenting problems: moderate  Diagnostic procedures: moderate  Management options: moderate    Patient Progress  Patient progress: improved        ED Course       Procedures  XR CHEST PA LAT: Impression: Right lung opacity and pleural thickening unchanged. 4:42 PM  Provider at bedside and discussing available lab and imaging results with patient. Patient verbalizes understanding of XR results. Provider will discharge patient once mother arrives. 11:44 PM  The patient has been reevaluated. The patient is ready for discharge. The patient's signs, symptoms, diagnosis, and discharge instructions have been discussed and the patient/ family has conveyed their understanding. The patient is to follow up as recommended or return to the ED should their symptoms worsen. Plan has been discussed and the patient is in agreement. LABORATORY TESTS:  No results found for this or any previous visit (from the past 12 hour(s)). IMAGING RESULTS:  XR CHEST PA LAT   Final Result        No results found. MEDICATIONS GIVEN:  Medications   HYDROmorphone (DILAUDID) syringe 1 mg (1 mg IntraVENous Given 7/26/18 1612)   butalbital-acetaminophen-caffeine (FIORICET, ESGIC) -40 mg per tablet 1 Tab (1 Tab Oral Given 7/26/18 1747)       IMPRESSION:  1. Shortness of breath        PLAN:  1. Discharge Medication List as of 7/26/2018  5:15 PM        2.    Follow-up Information     Follow up With Details Comments Rajesh Colorado NP  If symptoms worsen [address]  [phone]      [facility] EMERGENCY DEP  If symptoms worsen [address] [facility]  [phone]            Return to ED for new or worsening symptoms Moises Franco MD

## 2018-07-26 NOTE — ED TRIAGE NOTES
Triage: Patient arrives via EMS from dialysis with c/o SOB and episode of low oxygen saturations. Patient denies SOB at this time/CP. Patient did complete her dialysis which she receives T, Th, Sat. Patient arrives in NAD, 96% RA.

## 2018-07-27 ENCOUNTER — PATIENT OUTREACH (OUTPATIENT)
Dept: FAMILY MEDICINE CLINIC | Age: 33
End: 2018-07-27

## 2018-07-27 NOTE — PROGRESS NOTES
Outbound call to patient seen in [facility] [place] [facility] on 7/26/18 following her dialysis for SOB. Patient verified by 3 identifiers. Stated she is doing fine. Felt much better by the time she got to the Ed. Her O2 sats dropped at dialysis and she was having some chest pain. Patient completed dialysis as was scheduled. Felt much better after treatment. Patient is scheduled for Saturday dialysis. Patient had appointment for today with PCP but has re-scheduled for 7/31/18. NN reminded patient of her dialysis on Tuesday/Thursday and Saturday and PCP appointment scheduled for Tuesday. Patient  given NN contact information for questions and concerns. Patient verbalized no changes with medications or plan of care during this visit. .  Current Outpatient Prescriptions   Medication Sig    carvedilol (COREG) 12.5 mg tablet Take 1 Tab by mouth two (2) times daily (with meals).  amLODIPine (NORVASC) 10 mg tablet Take 1 Tab by mouth daily.  cloNIDine HCl (CATAPRES) 0.1 mg tablet Take 1 Tab by mouth three (3) times daily for 30 days.  apixaban (ELIQUIS) 5 mg tablet Take 5 mg by mouth two (2) times a day. Indications: pulmonary thromboembolism    polyethylene glycol (MIRALAX) 17 gram packet Take 17 g by mouth daily.  oxyCODONE-acetaminophen (PERCOCET) 5-325 mg per tablet Take 1 Tab by mouth two (2) times daily as needed for Pain (severe back pain). Max Daily Amount: 2 Tabs.  predniSONE (DELTASONE) 5 mg tablet Take 2 Tabs by mouth daily.  losartan (COZAAR) 50 mg tablet Take 1 Tab by mouth daily for 30 days.  senna (SENNA) 8.6 mg tablet Take 1 Tab by mouth daily as needed for Constipation. for constipation    amitriptyline (ELAVIL) 25 mg tablet Take 25 mg by mouth nightly.  fluticasone-vilanterol (BREO ELLIPTA) 200-25 mcg/dose inhaler Take 1 Puff by inhalation daily. Rinse mouth out after use    gemfibrozil (LOPID) 600 mg tablet Take 300 mg by mouth daily.     azaTHIOprine (IMURAN) 50 mg tablet Take 50 mg by mouth daily (after breakfast).  albuterol (PROVENTIL HFA, VENTOLIN HFA, PROAIR HFA) 90 mcg/actuation inhaler Take 1-2 Puffs by inhalation every four (4) hours as needed for Wheezing or Shortness of Breath.  hydroxychloroquine (PLAQUENIL) 200 mg tablet Take 200 mg by mouth two (2) times a day.  allopurinol (ZYLOPRIM) 100 mg tablet Take 100 mg by mouth daily (after breakfast). Needs to TAKE on a full stomach; will cause her to be nauseated.  pantoprazole (PROTONIX) 40 mg tablet Take 1 Tab by mouth Daily (before breakfast). No current facility-administered medications for this visit. Case management Plan:  NN will continue to attempt contacts with patient by telephone or during office visit within next 7-10 days. Will continue to follow as necessary for the remaining 30 days post visit and will reassess to see if CCM assessment is needed before discharge.

## 2018-07-28 ENCOUNTER — HOSPITAL ENCOUNTER (EMERGENCY)
Age: 33
Discharge: HOME | End: 2018-07-28
Attending: EMERGENCY MEDICINE | Admitting: EMERGENCY MEDICINE
Payer: MEDICARE

## 2018-07-28 ENCOUNTER — APPOINTMENT (OUTPATIENT)
Dept: GENERAL RADIOLOGY | Age: 33
End: 2018-07-28
Attending: EMERGENCY MEDICINE
Payer: MEDICARE

## 2018-07-28 VITALS
HEIGHT: 65 IN | TEMPERATURE: 98.3 F | SYSTOLIC BLOOD PRESSURE: 171 MMHG | DIASTOLIC BLOOD PRESSURE: 99 MMHG | HEART RATE: 82 BPM | RESPIRATION RATE: 20 BRPM | OXYGEN SATURATION: 98 % | WEIGHT: 148.2 LBS | BODY MASS INDEX: 24.69 KG/M2

## 2018-07-28 DIAGNOSIS — R10.84 ABDOMINAL PAIN, GENERALIZED: ICD-10-CM

## 2018-07-28 DIAGNOSIS — Z99.2 ESRD ON DIALYSIS (HCC): ICD-10-CM

## 2018-07-28 DIAGNOSIS — N18.6 ESRD ON DIALYSIS (HCC): ICD-10-CM

## 2018-07-28 DIAGNOSIS — R60.1 ANASARCA: Primary | ICD-10-CM

## 2018-07-28 LAB
ALBUMIN SERPL-MCNC: 2.7 G/DL (ref 3.5–5)
ALBUMIN/GLOB SERPL: 0.5 {RATIO} (ref 1.1–2.2)
ALP SERPL-CCNC: 106 U/L (ref 45–117)
ALT SERPL-CCNC: 12 U/L (ref 12–78)
ANION GAP SERPL CALC-SCNC: 9 MMOL/L (ref 5–15)
AST SERPL-CCNC: 21 U/L (ref 15–37)
ATRIAL RATE: 85 BPM
BASOPHILS # BLD: 0 K/UL (ref 0–0.1)
BASOPHILS NFR BLD: 1 % (ref 0–1)
BILIRUB SERPL-MCNC: 0.4 MG/DL (ref 0.2–1)
BNP SERPL-MCNC: ABNORMAL PG/ML (ref 0–125)
BUN SERPL-MCNC: 60 MG/DL (ref 6–20)
BUN/CREAT SERPL: 9 (ref 12–20)
CALCIUM SERPL-MCNC: 9 MG/DL (ref 8.5–10.1)
CALCULATED P AXIS, ECG09: 13 DEGREES
CALCULATED R AXIS, ECG10: 24 DEGREES
CALCULATED T AXIS, ECG11: -16 DEGREES
CHLORIDE SERPL-SCNC: 97 MMOL/L (ref 97–108)
CO2 SERPL-SCNC: 30 MMOL/L (ref 21–32)
CREAT SERPL-MCNC: 6.62 MG/DL (ref 0.55–1.02)
DIAGNOSIS, 93000: NORMAL
DIFFERENTIAL METHOD BLD: ABNORMAL
EOSINOPHIL # BLD: 0.1 K/UL (ref 0–0.4)
EOSINOPHIL NFR BLD: 2 % (ref 0–7)
ERYTHROCYTE [DISTWIDTH] IN BLOOD BY AUTOMATED COUNT: 19.7 % (ref 11.5–14.5)
GLOBULIN SER CALC-MCNC: 5.4 G/DL (ref 2–4)
GLUCOSE SERPL-MCNC: 94 MG/DL (ref 65–100)
HCT VFR BLD AUTO: 34.8 % (ref 35–47)
HGB BLD-MCNC: 10.3 G/DL (ref 11.5–16)
IMM GRANULOCYTES # BLD: 0 K/UL (ref 0–0.04)
IMM GRANULOCYTES NFR BLD AUTO: 0 % (ref 0–0.5)
LYMPHOCYTES # BLD: 0.7 K/UL (ref 0.8–3.5)
LYMPHOCYTES NFR BLD: 15 % (ref 12–49)
MCH RBC QN AUTO: 29.3 PG (ref 26–34)
MCHC RBC AUTO-ENTMCNC: 29.6 G/DL (ref 30–36.5)
MCV RBC AUTO: 98.9 FL (ref 80–99)
MONOCYTES # BLD: 0.2 K/UL (ref 0–1)
MONOCYTES NFR BLD: 4 % (ref 5–13)
NEUTS SEG # BLD: 3.4 K/UL (ref 1.8–8)
NEUTS SEG NFR BLD: 78 % (ref 32–75)
NRBC # BLD: 0.02 K/UL (ref 0–0.01)
NRBC BLD-RTO: 0.5 PER 100 WBC
P-R INTERVAL, ECG05: 144 MS
PLATELET # BLD AUTO: 260 K/UL (ref 150–400)
PMV BLD AUTO: 10.3 FL (ref 8.9–12.9)
POTASSIUM SERPL-SCNC: 4.9 MMOL/L (ref 3.5–5.1)
PROT SERPL-MCNC: 8.1 G/DL (ref 6.4–8.2)
Q-T INTERVAL, ECG07: 378 MS
QRS DURATION, ECG06: 72 MS
QTC CALCULATION (BEZET), ECG08: 449 MS
RBC # BLD AUTO: 3.52 M/UL (ref 3.8–5.2)
RBC MORPH BLD: ABNORMAL
RBC MORPH BLD: ABNORMAL
SODIUM SERPL-SCNC: 136 MMOL/L (ref 136–145)
TROPONIN I SERPL-MCNC: <0.05 NG/ML
VENTRICULAR RATE, ECG03: 85 BPM
WBC # BLD AUTO: 4.4 K/UL (ref 3.6–11)

## 2018-07-28 PROCEDURE — 85025 COMPLETE CBC W/AUTO DIFF WBC: CPT | Performed by: EMERGENCY MEDICINE

## 2018-07-28 PROCEDURE — 83880 ASSAY OF NATRIURETIC PEPTIDE: CPT | Performed by: EMERGENCY MEDICINE

## 2018-07-28 PROCEDURE — 84484 ASSAY OF TROPONIN QUANT: CPT | Performed by: EMERGENCY MEDICINE

## 2018-07-28 PROCEDURE — 36415 COLL VENOUS BLD VENIPUNCTURE: CPT | Performed by: EMERGENCY MEDICINE

## 2018-07-28 PROCEDURE — 71046 X-RAY EXAM CHEST 2 VIEWS: CPT

## 2018-07-28 PROCEDURE — 99283 EMERGENCY DEPT VISIT LOW MDM: CPT

## 2018-07-28 PROCEDURE — 93005 ELECTROCARDIOGRAM TRACING: CPT

## 2018-07-28 PROCEDURE — 80053 COMPREHEN METABOLIC PANEL: CPT | Performed by: EMERGENCY MEDICINE

## 2018-07-28 PROCEDURE — 74011250637 HC RX REV CODE- 250/637: Performed by: EMERGENCY MEDICINE

## 2018-07-28 RX ORDER — ACETAMINOPHEN 500 MG
1000 TABLET ORAL
Status: COMPLETED | OUTPATIENT
Start: 2018-07-28 | End: 2018-07-28

## 2018-07-28 RX ADMIN — ACETAMINOPHEN 1000 MG: 500 TABLET, FILM COATED ORAL at 10:14

## 2018-07-28 NOTE — DISCHARGE INSTRUCTIONS
Abdominal Pain: Care Instructions  Your Care Instructions    Abdominal pain has many possible causes. Some aren't serious and get better on their own in a few days. Others need more testing and treatment. If your pain continues or gets worse, you need to be rechecked and may need more tests to find out what is wrong. You may need surgery to correct the problem. Don't ignore new symptoms, such as fever, nausea and vomiting, urination problems, pain that gets worse, and dizziness. These may be signs of a more serious problem. Your doctor may have recommended a follow-up visit in the next 8 to 12 hours. If you are not getting better, you may need more tests or treatment. The doctor has checked you carefully, but problems can develop later. If you notice any problems or new symptoms, get medical treatment right away. Follow-up care is a key part of your treatment and safety. Be sure to make and go to all appointments, and call your doctor if you are having problems. It's also a good idea to know your test results and keep a list of the medicines you take. How can you care for yourself at home? · Rest until you feel better. · To prevent dehydration, drink plenty of fluids, enough so that your urine is light yellow or clear like water. Choose water and other caffeine-free clear liquids until you feel better. If you have kidney, heart, or liver disease and have to limit fluids, talk with your doctor before you increase the amount of fluids you drink. · If your stomach is upset, eat mild foods, such as rice, dry toast or crackers, bananas, and applesauce. Try eating several small meals instead of two or three large ones. · Wait until 48 hours after all symptoms have gone away before you have spicy foods, alcohol, and drinks that contain caffeine. · Do not eat foods that are high in fat. · Avoid anti-inflammatory medicines such as aspirin, ibuprofen (Advil, Motrin), and naproxen (Aleve).  These can cause stomach upset. Talk to your doctor if you take daily aspirin for another health problem. When should you call for help? Call 911 anytime you think you may need emergency care. For example, call if:    · You passed out (lost consciousness).     · You pass maroon or very bloody stools.     · You vomit blood or what looks like coffee grounds.     · You have new, severe belly pain.    Call your doctor now or seek immediate medical care if:    · Your pain gets worse, especially if it becomes focused in one area of your belly.     · You have a new or higher fever.     · Your stools are black and look like tar, or they have streaks of blood.     · You have unexpected vaginal bleeding.     · You have symptoms of a urinary tract infection. These may include:  ¨ Pain when you urinate. ¨ Urinating more often than usual.  ¨ Blood in your urine.     · You are dizzy or lightheaded, or you feel like you may faint.    Watch closely for changes in your health, and be sure to contact your doctor if:    · You are not getting better after 1 day (24 hours). Where can you learn more? Go to http://jorge-rochelle.info/. Enter G398 in the search box to learn more about \"Abdominal Pain: Care Instructions. \"  Current as of: November 20, 2017  Content Version: 11.7  © 5450-5827 [employer]. Care instructions adapted under license by [employer] (which disclaims liability or warranty for this information). If you have questions about a medical condition or this instruction, always ask your healthcare professional. [place] [address] any warranty or liability for your use of this information.

## 2018-07-28 NOTE — ED TRIAGE NOTES
Pt complains of shortness of breath \"for a while\", specifies about 2 weeks. Also complains of abd swelling and pain that stared about 2 days ago.

## 2018-07-28 NOTE — PROGRESS NOTES
Date of previous inpatient admission/ ED visit? Patient was last seen in the ED 7/26/18 for Shortness of Breath. Patient last admitted 7/19-7/22 for Hypertensive Urgency. Patient with 3 ED visits within the past 6 months and 8 IP admissions within the past 12 months. What brought the patient back to ED? Chart reviewed. Patient complains of shortness of breath \"for a while\", specifies about 2 weeks. Patient also complains of abdominal swelling and pain that started about 2 days ago. Patient receives dialysis on T, Th, Sat. Did patient decline recommended services during last admission/ ED visit (if yes, what)? NO    Has patient seen a provider since their last inpatient admission/ED visit (if yes, when)? YES, dialysis provider on 7/26  and was contacted by RN Navigator for patient outreach on 7/23/18.     CM Interventions:  From previous inpatient admission/ED visit:  Patient discharged home with outpatient services and family assistance. Patient discharged home with family and personal CG's who assist with ADL's and IADL's. Family transported patient at discharge. From current inpatient admission/ED visit:  Patient in the ED being evaluated and needs assessed. Disposition needs TBD/sbject to change pending recommendations. CM will continue to follow and assist with disposition needs as they arise.     DERRELL Flores/DANYEL  10:35 AM

## 2018-07-28 NOTE — ED PROVIDER NOTES
HPI Comments: 29 y.o. female with past medical history significant for asthma, carditis, SLE, ESRD (Tu, Th, Sat dialysis), thromboembolus, GERD, and HTN who presents from home via private vehicle with chief complaint of abdominal distension. Pt reports \"over one month\" of SOB and abdominal pain (R>L), then started today with abdominal distension. Pt reports she was seen here 2 days ago with SOB after a dialysis treatment. Pt states she had a chest tube in, removed \"over a month ago\". Pt states she is a Tu, Th, Sat dialysis pt and is due today for an appointment. There are no other acute medical concerns at this time. Old Chart Review:  Pt was admitted 6/16-6/28 for pneumonia and an abdominal cavity abscess. Pt had a CT scan done on 6/17 that showed diffuse soft tissue edema, mediastinal and mesenteric edema and presacral edema, as well as a small amount of ascites. Pt was also admitted here 7/19-7/22 for SOB and uncontrolled HTN. Pt was seen here 2 days ago, 7/26, for SOB after dialysis, was discharged with PCP follow up. Social hx: Nonsmoker; No EtOH use  PCP: Amirah Gutiérrez NP    Note written by Boy Segal, as dictated by Erum Rodriguez MD 9:31 AM    The history is provided by the patient and a parent. No  was used. Past Medical History:   Diagnosis Date    Anemia     secondary to lupus    Asthma     no inhaler use in past 2 to 3 years    Carditis     Chronic kidney disease     ESRD    Chronic pain     DDD (degenerative disc disease), lumbar     ESRD (end stage renal disease) (HCC)     GERD (gastroesophageal reflux disease)     [facility]    [address]  Tuesday,  Thursday,  and Saturday.      Hypercholesterolemia     Hypertension     Intractable nausea and vomiting 10/21/2015    Long term (current) use of anticoagulants     Lupus (systemic lupus erythematosus) (HCC)     Malignant hypertension with chronic kidney disease stage V ([facility] [address]     Peritoneal dialysis status ([facility] [address] 10/2015    x 2 years Stopped 2017 due to infection and removed.  Poor historian 2018    With medications    Thromboembolus ([facility] [address] 2013    lungs    Transfusion history     Last Transfusion 2017  at [address] [place] [address]       Past Surgical History:   Procedure Laterality Date    HX  SECTION  11/2006    x1    HX OTHER SURGICAL  9/16/15    INSERTION PD CATH; Removed 2017    HX VASCULAR ACCESS Right 2017    Double-Lumen henry catheter upper chest         Family History:   Problem Relation Age of Onset    Diabetes Father     Hypertension Father     Cancer Other      aunt with breast cancer    Diabetes Mother        Social History     Social History    Marital status: SINGLE     Spouse name: N/A    Number of children: N/A    Years of education: N/A     Occupational History    Not on file. Social History Main Topics    Smoking status: Never Smoker    Smokeless tobacco: Never Used    Alcohol use No    Drug use: Yes     Special: Prescription, OTC    Sexual activity: Not Currently     Other Topics Concern     Service No    Blood Transfusions No    Caffeine Concern No    Occupational Exposure No    Hobby Hazards No    Sleep Concern No    Stress Concern No    Weight Concern No    Special Diet No    Back Care Yes     low back pain    Exercise No    Bike Helmet No    Seat Belt Yes    Self-Exams No     Social History Narrative    Lives with parents and daughter. ALLERGIES: Compazine [prochlorperazine edisylate]    Review of Systems   Respiratory: Positive for shortness of breath (Chronic). Gastrointestinal: Positive for abdominal distention and abdominal pain (Chronic). All other systems reviewed and are negative.       Vitals:    18 0915   BP: (!) 185/127   Pulse: 82   Resp: 20   Temp: 98.2 °F (36.8 °C)   SpO2: 96%   Weight: 67.2 kg (148 lb 3.2 oz)   Height: 5' 5\" (1.651 m) Physical Exam   Constitutional: She appears well-developed and well-nourished. No distress. HENT:   Head: Normocephalic and atraumatic. Eyes: Conjunctivae are normal.   Neck: Neck supple. Cardiovascular: Normal rate and regular rhythm. Pulmonary/Chest: Effort normal. No stridor. No respiratory distress. She has rales. Bilateral rales. Abdominal: She exhibits distension. There is tenderness. Abdomen is firm and distended with 3+ pitting anasarca over abdomen. Diffuse R sided tenderness to palpation. Musculoskeletal: Normal range of motion. Neurological: She is alert. Coordination normal.   Skin: Skin is warm and dry. Small seroma over old chest tube site, tender to touch. Psychiatric: She has a normal mood and affect. Nursing note and vitals reviewed. Note written by Boy Mario, as dictated by Phu Benitez MD 9:31 AM    MDM    59-year-old female with history of end-stage renal disease on dialysis who previously had been on peritoneal dialysis but currently receiving HD due for treatment today presents with acute on chronic abdominal pain. She has findings of diffuse abdominal wall anasarca that was noted previously on CT imaging. She is tender but does not appear to have focal findings, no fever or other signs of infection, well-appearing and at baseline. She was seen 2 days ago for mild hypoxemia and was sent for extra dialysis treatment. Stable XR findings. She is persistently hypertensive which is her baseline likely resulting in an element of diastolic heart failure contributing to her anasarca. I recommended that she be compliant with dialysis, work on medications to control her hypertension, and hopefully over time with diuresis her symptoms should gradually improve.  Troponin negative, no EKG changes, no significant metabolic or hematologic abnormalities and I do not suspect surgical etiology currently with stable vital signs and lack of white blood cell count elevation or consistent exam features. Dialysis center was called and agreed to take the patient late today so she does admit to treatment. No emergency indication for hospitalization or dialysis. Return precautions were discussed for worsening or new concerning symptoms. ED Course       Procedures    ED EKG interpretation:  Rhythm: normal sinus rhythm; and regular . Rate (approx.): 85 bpm; Nonspecific anterior T wave abnormalities, unchanged from prior study.      Note written by Oral Boy Arellano, as dictated by Thierry Smith MD 10:01 AM

## 2018-07-30 ENCOUNTER — PATIENT OUTREACH (OUTPATIENT)
Dept: FAMILY MEDICINE CLINIC | Age: 33
End: 2018-07-30

## 2018-07-30 ENCOUNTER — OFFICE VISIT (OUTPATIENT)
Dept: NEUROLOGY | Age: 33
End: 2018-07-30

## 2018-07-30 VITALS
HEIGHT: 65 IN | DIASTOLIC BLOOD PRESSURE: 102 MMHG | OXYGEN SATURATION: 96 % | HEART RATE: 89 BPM | SYSTOLIC BLOOD PRESSURE: 160 MMHG | BODY MASS INDEX: 24.66 KG/M2 | WEIGHT: 148 LBS

## 2018-07-30 DIAGNOSIS — M54.50 CHRONIC BILATERAL LOW BACK PAIN WITHOUT SCIATICA: ICD-10-CM

## 2018-07-30 DIAGNOSIS — R07.81 RIB PAIN ON RIGHT SIDE: ICD-10-CM

## 2018-07-30 DIAGNOSIS — G89.29 CHRONIC BILATERAL LOW BACK PAIN WITHOUT SCIATICA: ICD-10-CM

## 2018-07-30 DIAGNOSIS — F11.90 CHRONIC, CONTINUOUS USE OF OPIOIDS: Primary | ICD-10-CM

## 2018-07-30 RX ORDER — OXYCODONE AND ACETAMINOPHEN 5; 325 MG/1; MG/1
TABLET ORAL
Qty: 75 TAB | Refills: 0 | Status: SHIPPED | OUTPATIENT
Start: 2018-07-30 | End: 2018-08-27 | Stop reason: SDUPTHER

## 2018-07-30 NOTE — PROGRESS NOTES
Pill count = pt did not bring        Pill count verified with patient. UDS obtained and sent =  Pain contract = on file   made available for MD review.

## 2018-07-30 NOTE — PROGRESS NOTES
Interval HPI:     This is a 29 y.o. female who is following up for     PMHx: chronic back pain, hx of fibromyalgia, hx of DVT/ coumadin, hx of Lupus, mild lower lumbar disc degeneration (L4-5 and L5-S1 with small annular tear at L5-S1)    Chief Complaint   Patient presents with    Pain (Chronic)     Mother accompanies her to today's visit    Continues to describe pain in lower back moderate to severe  Continues to describe pain in right upper back/ right side of ribs as moderate  Says her whole body pain is worse (hx of fibromyalgia)  Most days pain is ranging from 8-10/ 10. Taking Percocet 5/325 one tab BID to TID  Ran out of medication 1.5 days ago. Was unable to get blood testing done for drug screen when at dialysis session    Tried/ failed: Gabapentin, Cymbalta, Amitriptyline (only helped get to sleep), Lyrica (abnormal behaviors), Hydrocodone (\"too strong\")    Reviewed : last filled Rx Percocet 5/ 325 (#60 tabs) on 7-2  Mother says she is holding the Rx for Hydromorphone 2 mg tabs (#14) that pt got at last hospital discharge. UDS Hx:  March 2017: consistent with Rx pain medication  9-13-17 UDS: only screened for codeine or morphine, which pt doesn't take  1-3-18: serum Drug screen: consistent with Rx pain medication (oxycodone)        Brief ROS: as above      Past Medical History:   Diagnosis Date    Anemia     secondary to lupus    Asthma     no inhaler use in past 2 to 3 years    Carditis     Chronic kidney disease     ESRD    Chronic pain     DDD (degenerative disc disease), lumbar     [facility]) ([facility]     GERD (gastroesophageal reflux disease)     Hemodialysis patient ([facility] [address] [facility] 12/21/2017    [address]  Tuesday,  Thursday,  and Saturday.      Hypercholesterolemia     Hypertension     Intractable nausea and vomiting 10/21/2015    Long term (current) use of anticoagulants     Lupus (systemic lupus erythematosus) ([facility]     Malignant hypertension with chronic kidney disease stage V ([facility]     Peritoneal dialysis status ([facility] [address] [facility] 10/2015    x 2 years Stopped 2017 due to infection and removed.  Poor historian 2018    With medications    Thromboembolus ([facility] [address] 2013    lungs    Transfusion history     Last Transfusion 2017  at [facility]       Past Surgical History:   Procedure Laterality Date    HX  SECTION  11/2006    x1    HX OTHER SURGICAL  9/16/15    INSERTION PD CATH; Removed 2017    HX VASCULAR ACCESS Right 2017    Double-Lumen henry catheter upper chest       Family History   Problem Relation Age of Onset    Diabetes Father     Hypertension Father     Cancer Other      aunt with breast cancer    Diabetes Mother        Social History     Social History    Marital status: SINGLE     Spouse name: N/A    Number of children: N/A    Years of education: N/A     Occupational History    Not on file. Social History Main Topics    Smoking status: Never Smoker    Smokeless tobacco: Never Used    Alcohol use No    Drug use: Yes     Special: Prescription, OTC    Sexual activity: Not Currently     Other Topics Concern     Service No    Blood Transfusions No    Caffeine Concern No    Occupational Exposure No    Hobby Hazards No    Sleep Concern No    Stress Concern No    Weight Concern No    Special Diet No    Back Care Yes     low back pain    Exercise No    Bike Helmet No    Seat Belt Yes    Self-Exams No     Social History Narrative    Lives with parents and daughter. Allergies   Allergen Reactions    Compazine [Prochlorperazine Edisylate] Nausea and Vomiting and Palpitations     Current Outpatient Prescriptions   Medication Sig Dispense Refill    oxyCODONE-acetaminophen (PERCOCET) 5-325 mg per tablet Take 1 tablet, two to three times a day, if needed for severe pain. 75 Tab 0    carvedilol (COREG) 12.5 mg tablet Take 1 Tab by mouth two (2) times daily (with meals).  60 Tab 0    amLODIPine (NORVASC) 10 mg tablet Take 1 Tab by mouth daily. 30 Tab 0    cloNIDine HCl (CATAPRES) 0.1 mg tablet Take 1 Tab by mouth three (3) times daily for 30 days. 90 Tab 0    apixaban (ELIQUIS) 5 mg tablet Take 5 mg by mouth two (2) times a day. Indications: pulmonary thromboembolism      polyethylene glycol (MIRALAX) 17 gram packet Take 17 g by mouth daily.  predniSONE (DELTASONE) 5 mg tablet Take 2 Tabs by mouth daily. 30 Tab 0    senna (SENNA) 8.6 mg tablet Take 1 Tab by mouth daily as needed for Constipation. for constipation      amitriptyline (ELAVIL) 25 mg tablet Take 25 mg by mouth nightly.  fluticasone-vilanterol (BREO ELLIPTA) 200-25 mcg/dose inhaler Take 1 Puff by inhalation daily. Rinse mouth out after use 1 Inhaler 5    gemfibrozil (LOPID) 600 mg tablet Take 300 mg by mouth daily.  azaTHIOprine (IMURAN) 50 mg tablet Take 50 mg by mouth daily (after breakfast).  albuterol (PROVENTIL HFA, VENTOLIN HFA, PROAIR HFA) 90 mcg/actuation inhaler Take 1-2 Puffs by inhalation every four (4) hours as needed for Wheezing or Shortness of Breath. 1 Inhaler 2    hydroxychloroquine (PLAQUENIL) 200 mg tablet Take 200 mg by mouth two (2) times a day.  allopurinol (ZYLOPRIM) 100 mg tablet Take 100 mg by mouth daily (after breakfast). Needs to TAKE on a full stomach; will cause her to be nauseated.  pantoprazole (PROTONIX) 40 mg tablet Take 1 Tab by mouth Daily (before breakfast).  30 Tab 0       Physical Exam  Vitals:    07/30/18 1530   BP: (!) 160/102   Pulse: 89   SpO2: 96%   Weight: 67.1 kg (148 lb)   Height: 5' 5\" (1.651 m)       Awake, resting in wheelchair, cachectic, talking, has dyspnea  Awake, alert and conversant   Ext: dialysis fistula in right forearm    MSK:  Lumbar spine:  + tender across lower back     Focused Neurological Exam     Mental status:   Alert and oriented to person, place situation  Mood appears stable  Normal thought processes    CNs: EOMI, Face symmetric, Hearing/ Language normal  Sensory: intact light touch in both legs  Motor: 4/ 5 strength in arms and legs    Reflexes: not tested  Gait: stands, ambulates short distance, appears weak    Impression    ICD-10-CM ICD-9-CM    1. Chronic, continuous use of opioids F11.90 305.51 13-DRUG SCREEN, UR   2. Chronic bilateral low back pain without sciatica M54.5 724.2 oxyCODONE-acetaminophen (PERCOCET) 5-325 mg per tablet    G89.29 338.29 13-DRUG SCREEN, UR   3.  Rib pain on right side R07.81 786.50 oxyCODONE-acetaminophen (PERCOCET) 5-325 mg per tablet        Advsied pt Mother to take hydromorphone tablets to her pharmacy and ask them to dispose of it  Increased # of Percocet 5/ 325 tabs to #75 to allow her to take BID-TID (no RF)  Pt to try to give UDS today  Follow up in 4 weeks       Signed By: Karon White MD     July 30, 2018

## 2018-07-30 NOTE — PROGRESS NOTES
Outbound call to patient today to complete discharge assessment from Ed visit on 7/28/18 at [facility]. Patient was seen for complains of shortness of breath, which she says she has had  \"for a while\", specifies about 2 weeks. Patient also complained of abd swelling and pain that started about 2 days ago. Patient was seen in Ed on 7/26/18 and discharged on 7/22/18 following a 2 day hospital stay for Hypertensive Urgency. Patient's Bp elevated on this visit to 185/127. Patient verified by 3 identifiers. NN was able to speak to mother who stated patient was gone to her cardiologist. Patient has appointment with cardiology and neurology scheduled for today. NN unable to complete medication review or discharge instructions with patient today. Patient scheduled to follow-up with PCP on tomorrow, 7/31/18. Case management Plan:  NN will continue to attempt contacts with patient by telephone or during office visit within next 7-10 days. Will continue to follow as necessary for the remaining 30 days post visit and will reassess to see if CCM assessment is needed before discharge.

## 2018-07-31 ENCOUNTER — PATIENT OUTREACH (OUTPATIENT)
Dept: FAMILY MEDICINE CLINIC | Age: 33
End: 2018-07-31

## 2018-07-31 ENCOUNTER — OFFICE VISIT (OUTPATIENT)
Dept: FAMILY MEDICINE CLINIC | Age: 33
End: 2018-07-31

## 2018-07-31 VITALS
HEART RATE: 93 BPM | SYSTOLIC BLOOD PRESSURE: 196 MMHG | HEIGHT: 65 IN | OXYGEN SATURATION: 93 % | TEMPERATURE: 98.2 F | DIASTOLIC BLOOD PRESSURE: 132 MMHG | WEIGHT: 133 LBS | RESPIRATION RATE: 16 BRPM | BODY MASS INDEX: 22.16 KG/M2

## 2018-07-31 DIAGNOSIS — Z99.2 ESRD ON PERITONEAL DIALYSIS (HCC): Chronic | ICD-10-CM

## 2018-07-31 DIAGNOSIS — Z71.89 ACP (ADVANCE CARE PLANNING): ICD-10-CM

## 2018-07-31 DIAGNOSIS — M32.14 LUPUS NEPHRITIS (HCC): ICD-10-CM

## 2018-07-31 DIAGNOSIS — Z00.00 ENCOUNTER FOR MEDICARE ANNUAL WELLNESS EXAM: ICD-10-CM

## 2018-07-31 DIAGNOSIS — R09.02 HYPOXIA: ICD-10-CM

## 2018-07-31 DIAGNOSIS — G47.10 UNCONTROLLED HYPERSOMNIA: Primary | ICD-10-CM

## 2018-07-31 DIAGNOSIS — J92.9 PLEURAL THICKENING: ICD-10-CM

## 2018-07-31 DIAGNOSIS — N18.6 ESRD ON PERITONEAL DIALYSIS (HCC): Chronic | ICD-10-CM

## 2018-07-31 RX ORDER — OXYCODONE HYDROCHLORIDE 5 MG/1
TABLET ORAL
COMMUNITY
Start: 2018-06-05 | End: 2018-08-27

## 2018-07-31 RX ORDER — LEVOFLOXACIN 500 MG/1
TABLET, FILM COATED ORAL
COMMUNITY
Start: 2018-07-17 | End: 2018-08-20

## 2018-07-31 RX ORDER — CLONIDINE HYDROCHLORIDE 0.2 MG/1
0.2 TABLET ORAL 2 TIMES DAILY
Qty: 60 TAB | Refills: 4 | Status: SHIPPED | OUTPATIENT
Start: 2018-07-31 | End: 2018-08-20

## 2018-07-31 RX ORDER — HYDROMORPHONE HYDROCHLORIDE 2 MG/1
TABLET ORAL
Refills: 0 | COMMUNITY
Start: 2018-07-14 | End: 2018-08-20

## 2018-07-31 NOTE — PATIENT INSTRUCTIONS
Learning About Hypoxemia  What is hypoxemia? Hypoxemia means that you don't have enough oxygen in your blood. It's a result of diseases that affect your heart or lungs. These include heart failure, COPD, and pulmonary fibrosis (scarring of the lungs). Being at high altitudes can also lead to hypoxemia. What happens when you have hypoxemia? Oxygen gets into your blood through your lungs. Your blood carries the oxygen to all parts of your body. When you have too little oxygen in your blood, your body doesn't get enough of it. With too little oxygen, your heart and other parts of your body don't work very well. What are the symptoms? In addition to the symptoms of whatever is causing your hypoxemia, you may:  · Get tired quickly. · Be short of breath when you are active. · Feel like your heart is pounding or racing. · Feel weak or dizzy. · Become confused. How is hypoxemia treated? Your doctor will do tests to find out how much oxygen is in your blood. He or she will look for the cause of your hypoxemia and treat that problem. For example, if you have heart failure, you may need medicines that help your heart pump better. · If your hypoxemia is not severe, your doctor may give you oxygen through a mask or nasal cannula (say \"RACHEL-georgeh-lucie\"). A cannula is a thin tube with two openings that fit just inside your nose. · If your hypoxemia is severe, you may have a breathing tube put into your windpipe. The breathing tube is attached to a machine that pushes air into your lungs. This machine is called a ventilator. · If you have a long-term problem with hypoxemia, your doctor may recommend that you use oxygen regularly. Some people need it all the time. Others need it from time to time throughout the day or overnight. Your doctor will tell you how much oxygen you need and how often to use it. Follow-up care is a key part of your treatment and safety.  Be sure to make and go to all appointments, and call your doctor if you are having problems. It's also a good idea to know your test results and keep a list of the medicines you take. Where can you learn more? Go to http://jorge-rochelle.info/. Enter M375 in the search box to learn more about \"Learning About Hypoxemia. \"  Current as of: December 6, 2017  Content Version: 11.7  © 1554-1809 [employer]. Care instructions adapted under license by [employer] (which disclaims liability or warranty for this information). If you have questions about a medical condition or this instruction, always ask your healthcare professional. [place] [address] any warranty or liability for your use of this information.

## 2018-07-31 NOTE — PROGRESS NOTES
[facility] Note    Ms. Miladis Alfonso is a 29y.o. year old female, she is seen today for Transition of Care services following a hospital discharge for hypertnesion on 7/22/18. Our office Nurse Navigator performed an outreach to Ms. Sebastian Jaime on 7/23/18 (within 2 business days of discharge) to complete medication reconciliation and a telephonic assessment of her condition. Subjective:  Hospital Follow up  Patient seen for hospital follow up for hypertensive urgency and ESRD. Patient present to [facility] ED on 7/19/18 with shortness of breath, elevated BP, and diffuse headache. Patient was admitted to telemetry unit and placed on nitroglycerin paste. Patient's BP improved in hospital and she was discharged on clonidine, Coreg and Norvasc. Patient reports home readings have been 180/120s. Patient having dialysis on Tues, Thurs and Sat. Patient reports ongoing chronic dyspnea. CT chest (7/19/18) showed:     IMPRESSION:    1. In the right hemithorax in the intrapleural air is no longer identified. There continues to be loculated pleural density in the right hemithorax similar  to the previous examination otherwise. 2. There are small nodular opacities in the lingula which were not present  previously. These may be inflammatory. Follow-up of this suggested. 3. No other significant changes since the previous exam..    Echocardiogram on 5/13/17 showed \"Systolic function was normal. Ejection  fraction was estimated in the range of 55 % to 60 %. There were no  regional wall motion abnormalities. Wall thickness was normal.\"    Prior to Admission medications    Medication Sig Start Date End Date Taking? Authorizing Provider   cloNIDine HCl (CATAPRES) 0.2 mg tablet Take 1 Tab by mouth two (2) times a day. 7/31/18  Yes Sean Friend, NP   oxyCODONE-acetaminophen (PERCOCET) 5-325 mg per tablet Take 1 tablet, two to three times a day, if needed for severe pain.  7/30/18  Yes Trey Carver MD   carvedilol (COREG) 12.5 mg tablet Take 1 Tab by mouth two (2) times daily (with meals). 7/22/18  Yes Nimisha Hilton MD   amLODIPine (NORVASC) 10 mg tablet Take 1 Tab by mouth daily. 7/22/18  Yes Nimisha Hilton MD   apixaban (ELIQUIS) 5 mg tablet Take 5 mg by mouth two (2) times a day. Indications: pulmonary thromboembolism   Yes Historical Provider   polyethylene glycol (MIRALAX) 17 gram packet Take 17 g by mouth daily. Yes Historical Provider   predniSONE (DELTASONE) 5 mg tablet Take 2 Tabs by mouth daily. 6/28/18  Yes Ruma Mcgovern MD   senna (SENNA) 8.6 mg tablet Take 1 Tab by mouth daily as needed for Constipation. for constipation   Yes Historical Provider   amitriptyline (ELAVIL) 25 mg tablet Take 25 mg by mouth nightly. Yes Historical Provider   fluticasone-vilanterol (BREO ELLIPTA) 200-25 mcg/dose inhaler Take 1 Puff by inhalation daily. Rinse mouth out after use 12/8/17  Yes Carlito Clarke NP   gemfibrozil (LOPID) 600 mg tablet Take 300 mg by mouth daily. 11/3/17  Yes Historical Provider   azaTHIOprine (IMURAN) 50 mg tablet Take 50 mg by mouth daily (after breakfast). 11/3/17  Yes Historical Provider   albuterol (PROVENTIL HFA, VENTOLIN HFA, PROAIR HFA) 90 mcg/actuation inhaler Take 1-2 Puffs by inhalation every four (4) hours as needed for Wheezing or Shortness of Breath. 10/30/17  Yes Michelle Kenny NP   hydroxychloroquine (PLAQUENIL) 200 mg tablet Take 200 mg by mouth two (2) times a day. Yes Darnell Franks MD   allopurinol (ZYLOPRIM) 100 mg tablet Take 100 mg by mouth daily (after breakfast). Needs to TAKE on a full stomach; will cause her to be nauseated. 10/15/15  Yes Historical Provider   pantoprazole (PROTONIX) 40 mg tablet Take 1 Tab by mouth Daily (before breakfast).  10/23/15  Yes Yrn Page MD   HYDROmorphone (DILAUDID) 2 mg tablet  7/14/18   Historical Provider   levoFLOXacin (LEVAQUIN) 500 mg tablet  7/17/18   Historical Provider   oxyCODONE IR (ROXICODONE) 5 mg immediate release tablet  6/5/18   Historical Provider          Allergies   Allergen Reactions    Compazine [Prochlorperazine Edisylate] Nausea and Vomiting and Palpitations         ROS  See HPI    Objective:   Physical Exam   Constitutional: She is oriented to person, place, and time. She appears well-developed and well-nourished. Neck: Normal range of motion. Neck supple. No JVD present. Carotid bruit is not present. No thyromegaly present. Cardiovascular: Normal rate, regular rhythm and intact distal pulses. Exam reveals no gallop and no friction rub. No murmur heard. Pulmonary/Chest: Effort normal and breath sounds normal. No respiratory distress. Musculoskeletal: She exhibits no edema. Lymphadenopathy:     She has no cervical adenopathy. Neurological: She is alert and oriented to person, place, and time. Psychiatric: She has a normal mood and affect. Her behavior is normal.   Nursing note and vitals reviewed. Visit Vitals    BP (!) 196/132 (BP 1 Location: Left arm, BP Patient Position: Sitting)    Pulse 93    Temp 98.2 °F (36.8 °C) (Oral)    Resp 16    Ht 5' 5\" (1.651 m)    Wt 133 lb (60.3 kg)    SpO2 93%    BMI 22.13 kg/m2     O2 at rest: 93% RA   O2 with exertion: 86%  O2 with exertion and O2: 95% 2 lpm    Assessment & Plan:  Diagnoses and all orders for this visit:    1. Uncontrolled hypersomnia  Not to goal. Increase Clonidine to 0.2 mg BID. -     cloNIDine HCl (CATAPRES) 0.2 mg tablet; Take 1 Tab by mouth two (2) times a day. 2. ESRD on peritoneal dialysis [facility])  Managed by nephrology, no changes. 3. Lupus nephritis ([facility] [address]  Managed by nephrology/rheumatology, no changes. 4. Hypoxia  Request pulmonology consult. Discussed need for O2 with patient. [facility]    5. Pleural thickening  -     [facility]      I have discussed the diagnosis with the patient and the intended plan as seen in the above orders.   The patient has received an after-visit summary along with patient information handout. I have discussed medication side effects and warnings with the patient as well. Follow-up Disposition:  Return in about 2 weeks (around 8/14/2018) for blood pressure, disease management.         Audi Zhu, NP

## 2018-07-31 NOTE — MR AVS SNAPSHOT
[address] 
[phone] Patient: Tutu Dasilva MRN: [id number] :1985 Visit Information Date & Time Provider Department Dept. Phone Encounter #  
 2018  3:45 PM Tina Garcia  W [facility] [phone] [id number] Follow-up Instructions Return in about 2 weeks (around 2018) for blood pressure, disease management. Your Appointments 2018  3:00 PM  
Follow Up with Marii Sanchez MD  
[facility]) Appt Note: fup pain mgt [place] [address] Counts include 234 beds at the [facility] 99 [id number] [phone]  
  
   
 [facility]  
  
    
 2018  1:00 PM  
ECHO CARDIOGRAMS 2D with Casie Gamboa [facility] Appt Note: appt schd by Montez Mcfarland NP echo for MR 1:00 Dr. Morgan Speak 2:00 [address] [place] [address]  
[address] [facility]  
  
    
 2018  2:00 PM  
ESTABLISHED PATIENT with Kamila Gipson MD  
[facility] ([facility]) Appt Note: appt schd by Montez Mcfarland NP echo for MR 1:00 Dr. Eddie Degroot 2:00 kmr [address] [place] [address]  
[address] [phone] Upcoming Health Maintenance Date Due DTaP/Tdap/Td series (1 - Tdap) 2007 Pneumococcal 19-64 Highest Risk (2 of 3 - PCV13) 10/1/2010 MEDICARE YEARLY EXAM 2018 Influenza Age 5 to Adult 2018 PAP AKA CERVICAL CYTOLOGY 2019 Allergies as of 2018  Review Complete On: 2018 By: Cheryl Mike LPN Severity Noted Reaction Type Reactions Compazine [Prochlorperazine Edisylate] High 07/11/2016   Systemic Nausea and Vomiting, Palpitations Current Immunizations  Reviewed on 3/16/2018 Name Date Influenza Vaccine 9/12/2017, 9/9/2012 Influenza Vaccine Split 9/1/2011 ZZZ-RETIRED (DO NOT USE) Pneumococcal Vaccine (Unspecified Type) 10/1/2009 Not reviewed this visit You Were Diagnosed With   
  
 Codes Comments Uncontrolled hypersomnia    -  Primary ICD-10-CM: G47.10 ICD-9-CM: 780.54 ESRD on peritoneal dialysis ([facility]     ICD-10-CM: N18.6, Z99.2 ICD-9-CM: 585.6, V45.11 Lupus nephritis ([facility]     ICD-10-CM: M32.14 ICD-9-CM: 710.0, 583.81 Hypoxia     ICD-10-CM: R09.02 
ICD-9-CM: 799.02 Vitals BP Pulse Temp Resp Height(growth percentile) Weight(growth percentile) (!) 196/132 (BP 1 Location: Left arm, BP Patient Position: Sitting) 93 98.2 °F (36.8 °C) (Oral) 16 5' 5\" (1.651 m) 133 lb (60.3 kg) SpO2 BMI OB Status Smoking Status 93% 22.13 kg/m2 Medically Induced Never Smoker Vitals History BMI and BSA Data Body Mass Index Body Surface Area  
 22.13 kg/m 2 1.66 m 2 Preferred Pharmacy Pharmacy Name Phone [address] [facility] [address] [facility] [address] Your Updated Medication List  
  
   
This list is accurate as of 7/31/18  4:35 PM.  Always use your most recent med list.  
  
  
  
  
 albuterol 90 mcg/actuation inhaler Commonly known as:  PROVENTIL HFA, VENTOLIN HFA, PROAIR HFA Take 1-2 Puffs by inhalation every four (4) hours as needed for Wheezing or Shortness of Breath. allopurinol 100 mg tablet Commonly known as:  [place] Take 100 mg by mouth daily (after breakfast). Needs to TAKE on a full stomach; will cause her to be nauseated. amitriptyline 25 mg tablet Commonly known as:  ELAVIL Take 25 mg by mouth nightly. amLODIPine 10 mg tablet Commonly known as:  Christiano Jeffery Take 1 Tab by mouth daily. apixaban 5 mg tablet Commonly known as:  Jasen Gault Take 5 mg by mouth two (2) times a day. Indications: pulmonary thromboembolism  
  
 azaTHIOprine 50 mg tablet Commonly known as:  [employer] Take 50 mg by mouth daily (after breakfast). carvedilol 12.5 mg tablet Commonly known as:  Hubert Peat Take 1 Tab by mouth two (2) times daily (with meals). cloNIDine HCl 0.2 mg tablet Commonly known as:  CATAPRES Take 1 Tab by mouth two (2) times a day. fluticasone-vilanterol 200-25 mcg/dose inhaler Commonly known as:  BREO ELLIPTA Take 1 Puff by inhalation daily. Rinse mouth out after use  
  
 gemfibrozil 600 mg tablet Commonly known as:  LOPID Take 300 mg by mouth daily. HYDROmorphone 2 mg tablet Commonly known as:  DILAUDID  
  
 levoFLOXacin 500 mg tablet Commonly known as:  LEVAQUIN  
  
 oxyCODONE IR 5 mg immediate release tablet Commonly known as:  ROXICODONE  
  
 oxyCODONE-acetaminophen 5-325 mg per tablet Commonly known as:  PERCOCET Take 1 tablet, two to three times a day, if needed for severe pain. pantoprazole 40 mg tablet Commonly known as:  PROTONIX Take 1 Tab by mouth Daily (before breakfast). PLAQUENIL 200 mg tablet Generic drug:  hydroxychloroquine Take 200 mg by mouth two (2) times a day. polyethylene glycol 17 gram packet Commonly known as:  Delneena Sosay Take 17 g by mouth daily. predniSONE 5 mg tablet Commonly known as:  Jassi Gun Take 2 Tabs by mouth daily. Senna 8.6 mg tablet Generic drug:  senna Take 1 Tab by mouth daily as needed for Constipation. for constipation Prescriptions Sent to Pharmacy Refills  
 cloNIDine HCl (CATAPRES) 0.2 mg tablet 4 Sig: Take 1 Tab by mouth two (2) times a day. Class: Normal  
 Pharmacy: 1000 [facility], [address] [place] [address] #: [phone]  Route: Oral  
  
 Follow-up Instructions Return in about 2 weeks (around 8/14/2018) for blood pressure, disease management. Patient Instructions Learning About Hypoxemia What is hypoxemia? Hypoxemia means that you don't have enough oxygen in your blood. It's a result of diseases that affect your heart or lungs. These include heart failure, COPD, and pulmonary fibrosis (scarring of the lungs). Being at high altitudes can also lead to hypoxemia. What happens when you have hypoxemia? Oxygen gets into your blood through your lungs. Your blood carries the oxygen to all parts of your body. When you have too little oxygen in your blood, your body doesn't get enough of it. With too little oxygen, your heart and other parts of your body don't work very well. What are the symptoms? In addition to the symptoms of whatever is causing your hypoxemia, you may: · Get tired quickly. · Be short of breath when you are active. · Feel like your heart is pounding or racing. · Feel weak or dizzy. · Become confused. How is hypoxemia treated? Your doctor will do tests to find out how much oxygen is in your blood. He or she will look for the cause of your hypoxemia and treat that problem. For example, if you have heart failure, you may need medicines that help your heart pump better. · If your hypoxemia is not severe, your doctor may give you oxygen through a mask or nasal cannula (say \"RACHEL-georgeh-lucie\"). A cannula is a thin tube with two openings that fit just inside your nose. · If your hypoxemia is severe, you may have a breathing tube put into your windpipe. The breathing tube is attached to a machine that pushes air into your lungs. This machine is called a ventilator. · If you have a long-term problem with hypoxemia, your doctor may recommend that you use oxygen regularly. Some people need it all the time. Others need it from time to time throughout the day or overnight.  Your doctor will tell you how much oxygen you need and how often to use it. Follow-up care is a key part of your treatment and safety. Be sure to make and go to all appointments, and call your doctor if you are having problems. It's also a good idea to know your test results and keep a list of the medicines you take. Where can you learn more? Go to http://jorge-rochelle.info/. Enter M375 in the search box to learn more about \"Learning About Hypoxemia. \" Current as of: December 6, 2017 Content Version: 11.7 © 7396-0978 [email or web address].im. Care instructions adapted under license by [employer] (which disclaims liability or warranty for this information). If you have questions about a medical condition or this instruction, always ask your healthcare professional. [address] any warranty or liability for your use of this information. Introducing [facility]! Carlos Banks introduces Twist and Shout patient portal. Now you can access parts of your medical record, email your doctor's office, and request medication refills online. 1. In your internet browser, go to [email or web address] Upt 2. Click on the First Time User? Click Here link in the Sign In box. You will see the New Member Sign Up page. 3. Enter your Twist and Shout Access Code exactly as it appears below. You will not need to use this code after youve completed the sign-up process. If you do not sign up before the expiration date, you must request a new code. · [employer]ist [employer] Shout Access Code: [email or web address] Expires: 9/1/2018  9:07 PM 
 
4. Enter the last four digits of your Social Security Number (xxxx) and Date of Birth (mm/dd/yyyy) as indicated and click Submit. You will be taken to the next sign-up page. 5. Create a Twist and Shout ID. This will be your Twist and Shout login ID and cannot be changed, so think of one that is secure and easy to remember. 6. Create a EpicForce password. You can change your password at any time. 7. Enter your Password Reset Question and Answer. This can be used at a later time if you forget your password. 8. Enter your e-mail address. You will receive e-mail notification when new information is available in [address] 9. Click Sign Up. You can now view and download portions of your medical record. 10. Click the Download Summary menu link to download a portable copy of your medical information. If you have questions, please visit the Frequently Asked Questions section of the EpicForce website. Remember, EpicForce is NOT to be used for urgent needs. For medical emergencies, dial 911. Now available from your iPhone and Android! Please provide this summary of care documentation to your next provider. Your primary care clinician is listed as Tina Garcia. If you have any questions after today's visit, please call [phone].

## 2018-07-31 NOTE — PROGRESS NOTES
Chief Complaint   Patient presents with   [address]     [address] [facility]- 7/26/2018- 7/29/2018- SOB, swelling     1. Have you been to the ER, urgent care clinic since your last visit? Hospitalized since your last visit? Yes- [address] [place] [address]- 7/26/2018- 7/29/2018- SOB, swelling    2. Have you seen or consulted any other health care providers outside of the [facility] since your last visit? Include any pap smears or colon screening.  No

## 2018-08-01 ENCOUNTER — PATIENT OUTREACH (OUTPATIENT)
Dept: FAMILY MEDICINE CLINIC | Age: 33
End: 2018-08-01

## 2018-08-01 NOTE — PROGRESS NOTES
.NN First Steps ACP            Advance Care Planning Conversation  First Steps®     Does individual have existing ACP documents? [] Yes  [x] No  If Yes, type of document:   Copy of document in electronic health record? [] Yes  [] No  If no, requested copy of document? [] Yes  [] No    Date of conversation: 7/31/18   Location: [facility]    Participants:   [x] Patient    [x] Prospective agent (not yet named in a document) / Other surrogate decision  maker / next of kin    Name:  Gladys Lucas Relationship to patient: Mother    Phone number: [phone] [] Healthcare agent (already designated in existing ACP document)    Name:     Relationship to patient:    Phone number:       Other persons present:   Name:     Relationship to patient:   Name:     Relationship to patient:    Individual's Fears/Concerns about planning: none verbalized     Goals/Narrative of Conversation: \"I know this is something I need to do. Been putting it off. \"    Conversation topics for Individual    Has learned the following from prior experiences with serious illness: \"Know it is not good to wait to have this done. I realize I have chronic conditions\"    Identifies the following as important for living well: \"Making sure my family knows what I want. \"    \"What cultural, Worship, spiritual, or personal beliefs (if any) do you have that might help you choose the care you want, or do not want? \"  Patient response:   None verbalized    \"Would you like to talk to someone about these beliefs or concerns? \"  Patient response: \"no\"    Conversation topics for Agent / Prospective Agent    Understanding of the role of healthcare agent: yes    Agent confirms Willingness to:   [x]Yes []No Accept role   [x] Yes []No Talk with individual about his/her goals, values, & preferences   [x] Yes [] No   Follow individual's decisions, even if do not agree with those decisions   [x]Yes  []No Make decisions in difficult moments    Other questions/concerns about role of agent: Patient will talk with agent and other family members  before signing document    Topics for Chronic Illness (if applicable):  Chronic kidney disease    \"What do you understand about your (name of illness)? \"  Patient response: I still need the dialysis, if I miss I get worse. Frequent admissions recently for SOB. \"    What do you understand about the complications that may occur with your (name of illness)? \"  Patient response: \"Seem to be more frequent lately\"    \"What do you understand about CPR? \" Patient response: \"Attempt to restart my heart if it stops\"    \"What outcome would you expect from CPR? \" Patient response: \"sometimes work and other times  it do not\"    \"What would be an unacceptable outcome? \" Patient response: \"not trying to do anything\"        First Steps® ACP Facilitator: Tila Elise RN    Length (minutes): 50    The following was provided (check all that apply):   [x] Clarification of the goals of ACP    [x] Criteria for choosing a healthcare agent   [x] Written information on the planning process      Healthcare Decision Information Cards:   [x] Help with Breathing Facts   [x] Tube Feeding Facts   [x] CPR Facts      [x] Review of the completion of the advance directive    [] Review of existing advance directive       Meeting Outcomes:   [x] ACP discussion completed   [] Advance directive form completed   [] Original of completed advance directive given to patient   [] Copy given to healthcare agent    [] Copy scanned to electronic medical record    If ACP discussion not completed, last interview topic discussed:    Patient wants to take information to discuss more.  All topics discussed  Follow-up plan:     [x] Schedule follow-up conversation to continue planning   [] Referred individual to provider for additional questions/concerns    [] Individual will invite agent/prospective agent to next ACP appointment   [] Recommended to review completed AMD annually or upon change in health status     [x] This note routed to one or more involved healthcare providers. Patient discussion and conversation done. Patient will talk with agent more and return tomorrow (8/1/18) to have document completed.

## 2018-08-01 NOTE — PROGRESS NOTES
Patient in with daughter today for appointment. Patient had verbalized to NN that she would complete ACP on today. Patient did not show, NN checked with PCP office and was informed that patient had left. NN called patient who was verified by 3 identifiers. Patient stated, \"I forgot the information packet and left it on the table. I will be back in 2 weeks and will complete documentation at that time. NN rescheduled patient for appointment, NN will not be here in two weeks and asked patient to follow-up with My Johnson RN when she comes in. Case management Plan:  NN will continue to attempt contacts with patient by telephone or during office visit within next 7-10 days. Will continue to follow as necessary for the remaining 30 days post visit and will reassess to see if CCM assessment is needed before discharge.

## 2018-08-01 NOTE — ACP (ADVANCE CARE PLANNING)
Advance Care Planning (ACP) Provider Conversation Snapshot    Date of ACP Conversation: 08/01/18  Persons included in Conversation:  patient and family  Length of ACP Conversation in minutes:  <16 minutes (Non-Billable)    Authorized Decision Maker (if patient is incapable of making informed decisions): This person is:   Healthcare Agent/Medical Power of  under Advance Directive          For Patients with Decision Making Capacity:   Values/Goals: Exploration of values, goals, and preferences if recovery is not expected, even with continued medical treatment in the event of:  Imminent death  \"In these circumstances, what matters most to you? \"  Care focused more on quantity (length) of life.     Conversation Outcomes / Follow-Up Plan:   Recommended completion of Advance Directive form after review of ACP materials and conversation with prospective healthcare agent

## 2018-08-01 NOTE — PROGRESS NOTES
Sourav Belle is a 29 y.o. female and presents for annual Medicare Wellness Visit. Problem List: Reviewed with patient and discussed risk factors. 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    Encounter for monitoring opioid maintenance therapy Z51.81, Z79.891    Malignant hypertension I10    ESRD on peritoneal dialysis ([facility] N18.6, Z99.2    Anemia of renal disease D63.1    Dependence on peritoneal dialysis ([facility] Z99.2    ACP (advance care planning) Z71.89    Chronic bilateral low back pain without sciatica M54.5, G89.29    Hypertension I10    Hypokalemia E87.6    Hypomagnesemia E83.42    Hypocalcemia E83.51    History of pulmonary embolism Z86.711    Peritonitis (HCC) K65.9    Generalized abdominal pain R10.84    Other constipation K59.09    Other ascites R18.8    Sepsis (HCC) A41.9    Intra-abdominal abscess (HCC) K65.1    Troponin level elevated R74.8    Rib pain on right side R07.81       Current medical providers:  Patient Care Team:  Cal Padilla NP as PCP - General (Nurse Practitioner)  Dick Smart MD (Neurology)  Tiffany Schwarz MD as Physician (Rheumatology)  GENARO Rapp MD as Surgeon (General Surgery)  Arlette Nelson MD (Nephrology)  Cate Casillas, RN as Ambulatory Care Navigator ([facility])  Nicole Tony, RN    PSH: Reviewed with patient  Past Surgical History:   Procedure Laterality Date    HX  SECTION  11/2006    x1    HX OTHER SURGICAL  9/16/15    INSERTION PD CATH;  Removed 2017    HX VASCULAR ACCESS Right 2017    Double-Lumen henry catheter upper chest        SH: Reviewed with patient  Social History   Substance Use Topics    Smoking status: Never Smoker    Smokeless tobacco: Never Used    Alcohol use No       FH: Reviewed with patient  Family History   Problem Relation Age of Onset    Diabetes Father     Hypertension Father     Cancer Other      aunt with breast cancer    Diabetes Mother        Medications/Allergies: Reviewed with patient  Current Outpatient Prescriptions on File Prior to Visit   Medication Sig Dispense Refill    oxyCODONE-acetaminophen (PERCOCET) 5-325 mg per tablet Take 1 tablet, two to three times a day, if needed for severe pain. 75 Tab 0    carvedilol (COREG) 12.5 mg tablet Take 1 Tab by mouth two (2) times daily (with meals). 60 Tab 0    amLODIPine (NORVASC) 10 mg tablet Take 1 Tab by mouth daily. 30 Tab 0    apixaban (ELIQUIS) 5 mg tablet Take 5 mg by mouth two (2) times a day. Indications: pulmonary thromboembolism      polyethylene glycol (MIRALAX) 17 gram packet Take 17 g by mouth daily.  predniSONE (DELTASONE) 5 mg tablet Take 2 Tabs by mouth daily. 30 Tab 0    senna (SENNA) 8.6 mg tablet Take 1 Tab by mouth daily as needed for Constipation. for constipation      amitriptyline (ELAVIL) 25 mg tablet Take 25 mg by mouth nightly.  fluticasone-vilanterol (BREO ELLIPTA) 200-25 mcg/dose inhaler Take 1 Puff by inhalation daily. Rinse mouth out after use 1 Inhaler 5    gemfibrozil (LOPID) 600 mg tablet Take 300 mg by mouth daily.  azaTHIOprine (IMURAN) 50 mg tablet Take 50 mg by mouth daily (after breakfast).  albuterol (PROVENTIL HFA, VENTOLIN HFA, PROAIR HFA) 90 mcg/actuation inhaler Take 1-2 Puffs by inhalation every four (4) hours as needed for Wheezing or Shortness of Breath. 1 Inhaler 2    hydroxychloroquine (PLAQUENIL) 200 mg tablet Take 200 mg by mouth two (2) times a day.  allopurinol (ZYLOPRIM) 100 mg tablet Take 100 mg by mouth daily (after breakfast). Needs to TAKE on a full stomach; will cause her to be nauseated.  pantoprazole (PROTONIX) 40 mg tablet Take 1 Tab by mouth Daily (before breakfast). 30 Tab 0     No current facility-administered medications on file prior to visit.        Allergies   Allergen Reactions    Compazine [Prochlorperazine Edisylate] Nausea and Vomiting and Palpitations       Objective:  Visit Vitals    BP (!) 196/132 (BP 1 Location: Left arm, BP Patient Position: Sitting)    Pulse 93    Temp 98.2 °F (36.8 °C) (Oral)    Resp 16    Ht 5' 5\" (1.651 m)    Wt 133 lb (60.3 kg)    SpO2 93%    BMI 22.13 kg/m2    Body mass index is 22.13 kg/(m^2). Assessment of cognitive impairment: Alert and oriented x 3    Depression Screen:   PHQ over the last two weeks 7/31/2018   PHQ Not Done -   Little interest or pleasure in doing things Not at all   Feeling down, depressed, irritable, or hopeless Not at all   Total Score PHQ 2 0       Fall Risk Assessment:    Fall Risk Assessment, last 12 mths 10/24/2017   Able to walk? Yes   Fall in past 12 months? No       Functional Ability:   Does the patient exhibit a steady gait? No - generalized weakness   How long did it take the patient to get up and walk from a sitting position? 2 sec   Is the patient self reliant?  (ie can do own laundry, meals, household chores)  no     Does the patient handle his/her own medications? yes     Does the patient handle his/her own money? yes     Is the patients home safe (ie good lighting, handrails on stairs and bath, etc.)? yes     Did you notice or did patient express any hearing difficulties? no     Did you notice or did patient express any vision difficulties?   no     Were distance and reading eye charts used? no       Advance Care Planning:   Patient was offered the opportunity to discuss advance care planning:  yes     Does patient have an Advance Directive:  no   If no, did you provide information on Caring Connections?   yes       Plan:      Orders Placed This Encounter    [facility] [address] [place] [address]    HYDROmorphone (DILAUDID) 2 mg tablet    levoFLOXacin (LEVAQUIN) 500 mg tablet    oxyCODONE IR (ROXICODONE) 5 mg immediate release tablet    cloNIDine HCl (CATAPRES) 0.2 mg tablet       Health Maintenance   Topic Date Due    DTaP/Tdap/Td series (1 - Tdap) 02/17/2007    Pneumococcal 19-64 Highest Risk (2 of 3 - PCV13) 10/01/2010    MEDICARE YEARLY EXAM  07/17/2018    Influenza Age 9 to Adult  08/01/2018    PAP AKA CERVICAL CYTOLOGY  04/13/2019       *Patient verbalized understanding and agreement with the plan. A copy of the After Visit Summary with personalized health plan was given to the patient today.

## 2018-08-01 NOTE — ACP (ADVANCE CARE PLANNING)
.NN First Steps ACP            Advance Care Planning Conversation  First Steps®     Does individual have existing ACP documents? [] Yes  [x] No  If Yes, type of document:   Copy of document in electronic health record? [] Yes  [] No  If no, requested copy of document? [] Yes  [] No    Date of conversation: 7/31/18   Location: [facility]    Participants:   [x] Patient    [x] Prospective agent (not yet named in a document) / Other surrogate decision  maker / next of kin    Name:  Kira Anna Relationship to patient: Mother    Phone number: [phone] [] Healthcare agent (already designated in existing ACP document)    Name:     Relationship to patient:    Phone number:       Other persons present:   Name:     Relationship to patient:   Name:     Relationship to patient:    Individual's Fears/Concerns about planning: none verbalized     Goals/Narrative of Conversation: \"I know this is something I need to do. Been putting it off. \"    Conversation topics for Individual    Has learned the following from prior experiences with serious illness: \"Know it is not good to wait to have this done. I realize I have chronic conditions\"    Identifies the following as important for living well: \"Making sure my family knows what I want. \"    \"What cultural, Worship, spiritual, or personal beliefs (if any) do you have that might help you choose the care you want, or do not want? \"  Patient response:   None verbalized    \"Would you like to talk to someone about these beliefs or concerns? \"  Patient response: \"no\"    Conversation topics for Agent / Prospective Agent    Understanding of the role of healthcare agent: yes    Agent confirms Willingness to:   [x]Yes []No Accept role   [x] Yes []No Talk with individual about his/her goals, values, & preferences   [x] Yes [] No   Follow individual's decisions, even if do not agree with those decisions   [x]Yes  []No Make decisions in difficult moments    Other questions/concerns about role of agent: Patient will talk with agent and other family members  before signing document    Topics for Chronic Illness (if applicable):  Chronic kidney disease    \"What do you understand about your (name of illness)? \"  Patient response: I still need the dialysis, if I miss I get worse. Frequent admissions recently for SOB. \"    What do you understand about the complications that may occur with your (name of illness)? \"  Patient response: \"Seem to be more frequent lately\"    \"What do you understand about CPR? \" Patient response: \"Attempt to restart my heart if it stops\"    \"What outcome would you expect from CPR? \" Patient response: \"sometimes work and other times  it do not\"    \"What would be an unacceptable outcome? \" Patient response: \"not trying to do anything\"        First Steps® ACP Facilitator: Susan Patel RN    Length (minutes): 50    The following was provided (check all that apply):   [x] Clarification of the goals of ACP    [x] Criteria for choosing a healthcare agent   [x] Written information on the planning process      Healthcare Decision Information Cards:   [x] Help with Breathing Facts   [x] Tube Feeding Facts   [x] CPR Facts      [x] Review of the completion of the advance directive    [] Review of existing advance directive       Meeting Outcomes:   [x] ACP discussion completed   [] Advance directive form completed   [] Original of completed advance directive given to patient   [] Copy given to healthcare agent    [] Copy scanned to electronic medical record    If ACP discussion not completed, last interview topic discussed:    Patient wants to take information to discuss more.  All topics discussed  Follow-up plan:     [x] Schedule follow-up conversation to continue planning   [] Referred individual to provider for additional questions/concerns    [] Individual will invite agent/prospective agent to next ACP appointment   [] Recommended to review completed AMD annually or upon change in health status     [x] This note routed to one or more involved healthcare providers. Patient discussion and conversation done. Patient will talk with agent more and return tomorrow (8/1/18) to have document completed.

## 2018-08-01 NOTE — PATIENT INSTRUCTIONS
Advance Care Planning: Care Instructions  Your Care Instructions    It can be hard to live with an illness that cannot be cured. But if your health is getting worse, you may want to make decisions about end-of-life care. Planning for the end of your life does not mean that you are giving up. It is a way to make sure that your wishes are met. Clearly stating your wishes can make it easier for your loved ones. Making plans while you are still able may also ease your mind and make your final days less stressful and more meaningful. Follow-up care is a key part of your treatment and safety. Be sure to make and go to all appointments, and call your doctor if you are having problems. It's also a good idea to know your test results and keep a list of the medicines you take. What can you do to plan for the end of life? · You can bring these issues up with your doctor. You do not need to wait until your doctor starts the conversation. You might start with \"I would not be willing to live with . Claye Shruti Bahena \" When you complete this sentence it helps your doctor understand your wishes. · Talk openly and honestly with your doctor. This is the best way to understand the decisions you will need to make as your health changes. Know that you can always change your mind. · Ask your doctor about commonly used life-support measures. These include tube feedings, breathing machines, and fluids given through a vein (IV). Understanding these treatments will help you decide whether you want them. · You may choose to have these life-supporting treatments for a limited time. This allows a trial period to see whether they will help you. You may also decide that you want your doctor to take only certain measures to keep you alive. It is important to spell out these conditions so that your doctor and family understand them. · Talk to your doctor about how long you are likely to live.  He or she may be able to give you an idea of what usually happens with your specific illness. · Think about preparing papers that state your wishes. This way there will not be any confusion about what you want. You can change your instructions at any time. Which papers should you prepare? Advance directives are legal papers that tell doctors how you want to be cared for at the end of your life. You do not need a  to write these papers. Ask your doctor or your state health department for information on how to write your advance directives. They may have the forms for each of these types of papers. Make sure your doctor has a copy of these on file, and give a copy to a family member or close friend. · Consider a do-not-resuscitate order (DNR). This order asks that no extra treatments be done if your heart stops or you stop breathing. Extra treatments may include cardiopulmonary resuscitation (CPR), electrical shock to restart your heart, or a machine to breathe for you. If you decide to have a DNR order, ask your doctor to explain and write it. Place the order in your home where everyone can easily see it. · Consider a living will. A living will explains your wishes about life support and other treatments at the end of your life if you become unable to speak for yourself. Living reza tell doctors to use or not use treatments that would keep you alive. You must have one or two witnesses or a notary present when you sign this form. · Consider a durable power of  for health care. This allows you to name a person to make decisions about your care if you are not able to. Most people ask a close friend or family member. Talk to this person about the kinds of treatments you want and those that you do not want. Make sure this person understands your wishes. These legal papers are simple to change. Tell your doctor what you want to change, and ask him or her to make a note in your medical file. Give your family updated copies of the papers.   Where can you learn more?  Go to http://jorge-rochelle.info/. Enter P184 in the search box to learn more about \"Advance Care Planning: Care Instructions. \"  Current as of: October 6, 2017  Content Version: 11.7  © 2991-1904 [employer]. Care instructions adapted under license by [employer] (which disclaims liability or warranty for this information). If you have questions about a medical condition or this instruction, always ask your healthcare professional. [address] any warranty or liability for your use of this information.

## 2018-08-04 LAB
ACID FAST STN SPEC: NEGATIVE
MYCOBACTERIUM SPEC QL CULT: NEGATIVE
SPECIMEN PREPARATION: NORMAL
SPECIMEN SOURCE: NORMAL

## 2018-08-07 LAB
AMPHETAMINES UR QL SCN: NEGATIVE NG/ML
BARBITURATES UR QL: NEGATIVE NG/ML
BENZODIAZ UR QL SCN: NEGATIVE NG/ML
BZE UR QL: NEGATIVE NG/ML
CANNABINOIDS UR QL SCN: NEGATIVE NG/ML
CREAT UR-MCNC: 40.2 MG/DL (ref 20–300)
ETHANOL UR-MCNC: NEGATIVE %
MEPERIDINE UR QL: NEGATIVE NG/ML
METHADONE UR QL: NEGATIVE NG/ML
OPIATES UR QL SCN: NEGATIVE NG/ML
OXYCODONE+OXYMORPHONE UR QL SCN: NEGATIVE
PCP UR QL: NEGATIVE NG/ML
PROPOXYPH UR QL: NEGATIVE NG/ML
TRAMADOL UR QL SCN: NEGATIVE NG/ML

## 2018-08-15 ENCOUNTER — APPOINTMENT (OUTPATIENT)
Dept: CT IMAGING | Age: 33
DRG: 682 | End: 2018-08-15
Attending: FAMILY MEDICINE
Payer: MEDICARE

## 2018-08-15 ENCOUNTER — APPOINTMENT (OUTPATIENT)
Dept: GENERAL RADIOLOGY | Age: 33
DRG: 682 | End: 2018-08-15
Attending: EMERGENCY MEDICINE
Payer: MEDICARE

## 2018-08-15 ENCOUNTER — HOSPITAL ENCOUNTER (INPATIENT)
Age: 33
LOS: 4 days | Discharge: HOME | DRG: 682 | End: 2018-08-20
Attending: EMERGENCY MEDICINE | Admitting: HOSPITALIST
Payer: MEDICARE

## 2018-08-15 DIAGNOSIS — R06.02 SOB (SHORTNESS OF BREATH): Primary | ICD-10-CM

## 2018-08-15 DIAGNOSIS — R79.89 ELEVATED BRAIN NATRIURETIC PEPTIDE (BNP) LEVEL: ICD-10-CM

## 2018-08-15 DIAGNOSIS — R77.8 TROPONIN LEVEL ELEVATED: ICD-10-CM

## 2018-08-15 LAB
ALBUMIN SERPL-MCNC: 3 G/DL (ref 3.5–5)
ALBUMIN/GLOB SERPL: 0.5 {RATIO} (ref 1.1–2.2)
ALP SERPL-CCNC: 92 U/L (ref 45–117)
ALT SERPL-CCNC: 18 U/L (ref 12–78)
ANION GAP SERPL CALC-SCNC: 13 MMOL/L (ref 5–15)
AST SERPL-CCNC: 47 U/L (ref 15–37)
BASOPHILS # BLD: 0 K/UL (ref 0–0.1)
BASOPHILS NFR BLD: 1 % (ref 0–1)
BILIRUB SERPL-MCNC: 0.7 MG/DL (ref 0.2–1)
BNP SERPL-MCNC: ABNORMAL PG/ML (ref 0–125)
BUN SERPL-MCNC: 36 MG/DL (ref 6–20)
BUN/CREAT SERPL: 6 (ref 12–20)
CALCIUM SERPL-MCNC: 9 MG/DL (ref 8.5–10.1)
CHLORIDE SERPL-SCNC: 97 MMOL/L (ref 97–108)
CO2 SERPL-SCNC: 28 MMOL/L (ref 21–32)
COMMENT, HOLDF: NORMAL
CREAT SERPL-MCNC: 5.81 MG/DL (ref 0.55–1.02)
DIFFERENTIAL METHOD BLD: ABNORMAL
EOSINOPHIL # BLD: 0 K/UL (ref 0–0.4)
EOSINOPHIL NFR BLD: 1 % (ref 0–7)
ERYTHROCYTE [DISTWIDTH] IN BLOOD BY AUTOMATED COUNT: 17.2 % (ref 11.5–14.5)
GLOBULIN SER CALC-MCNC: 5.8 G/DL (ref 2–4)
GLUCOSE SERPL-MCNC: 97 MG/DL (ref 65–100)
HCT VFR BLD AUTO: 39.6 % (ref 35–47)
HGB BLD-MCNC: 11.7 G/DL (ref 11.5–16)
IMM GRANULOCYTES # BLD: 0 K/UL (ref 0–0.04)
IMM GRANULOCYTES NFR BLD AUTO: 0 % (ref 0–0.5)
LYMPHOCYTES # BLD: 0.8 K/UL (ref 0.8–3.5)
LYMPHOCYTES NFR BLD: 26 % (ref 12–49)
MCH RBC QN AUTO: 28.3 PG (ref 26–34)
MCHC RBC AUTO-ENTMCNC: 29.5 G/DL (ref 30–36.5)
MCV RBC AUTO: 95.9 FL (ref 80–99)
MONOCYTES # BLD: 0.1 K/UL (ref 0–1)
MONOCYTES NFR BLD: 4 % (ref 5–13)
NEUTS SEG # BLD: 2 K/UL (ref 1.8–8)
NEUTS SEG NFR BLD: 68 % (ref 32–75)
NRBC # BLD: 0 K/UL (ref 0–0.01)
NRBC BLD-RTO: 0 PER 100 WBC
PLATELET # BLD AUTO: 180 K/UL (ref 150–400)
PMV BLD AUTO: 11.2 FL (ref 8.9–12.9)
POTASSIUM SERPL-SCNC: 4.9 MMOL/L (ref 3.5–5.1)
PROT SERPL-MCNC: 8.8 G/DL (ref 6.4–8.2)
RBC # BLD AUTO: 4.13 M/UL (ref 3.8–5.2)
RBC MORPH BLD: ABNORMAL
SAMPLES BEING HELD,HOLD: NORMAL
SODIUM SERPL-SCNC: 138 MMOL/L (ref 136–145)
TROPONIN I SERPL-MCNC: 0.07 NG/ML
TROPONIN I SERPL-MCNC: 0.07 NG/ML
WBC # BLD AUTO: 2.9 K/UL (ref 3.6–11)

## 2018-08-15 PROCEDURE — 84484 ASSAY OF TROPONIN QUANT: CPT | Performed by: EMERGENCY MEDICINE

## 2018-08-15 PROCEDURE — 96374 THER/PROPH/DIAG INJ IV PUSH: CPT

## 2018-08-15 PROCEDURE — 74011250636 HC RX REV CODE- 250/636: Performed by: FAMILY MEDICINE

## 2018-08-15 PROCEDURE — 99218 HC RM OBSERVATION: CPT

## 2018-08-15 PROCEDURE — 36415 COLL VENOUS BLD VENIPUNCTURE: CPT | Performed by: EMERGENCY MEDICINE

## 2018-08-15 PROCEDURE — 74011250637 HC RX REV CODE- 250/637: Performed by: EMERGENCY MEDICINE

## 2018-08-15 PROCEDURE — 83880 ASSAY OF NATRIURETIC PEPTIDE: CPT | Performed by: EMERGENCY MEDICINE

## 2018-08-15 PROCEDURE — 80053 COMPREHEN METABOLIC PANEL: CPT | Performed by: EMERGENCY MEDICINE

## 2018-08-15 PROCEDURE — 74011250637 HC RX REV CODE- 250/637: Performed by: FAMILY MEDICINE

## 2018-08-15 PROCEDURE — 74176 CT ABD & PELVIS W/O CONTRAST: CPT

## 2018-08-15 PROCEDURE — 99285 EMERGENCY DEPT VISIT HI MDM: CPT

## 2018-08-15 PROCEDURE — 93005 ELECTROCARDIOGRAM TRACING: CPT

## 2018-08-15 PROCEDURE — 74011250636 HC RX REV CODE- 250/636: Performed by: INTERNAL MEDICINE

## 2018-08-15 PROCEDURE — 85025 COMPLETE CBC W/AUTO DIFF WBC: CPT | Performed by: EMERGENCY MEDICINE

## 2018-08-15 PROCEDURE — 96376 TX/PRO/DX INJ SAME DRUG ADON: CPT

## 2018-08-15 PROCEDURE — 71046 X-RAY EXAM CHEST 2 VIEWS: CPT

## 2018-08-15 RX ORDER — HYDRALAZINE HYDROCHLORIDE 20 MG/ML
10 INJECTION INTRAMUSCULAR; INTRAVENOUS
Status: DISCONTINUED | OUTPATIENT
Start: 2018-08-15 | End: 2018-08-16

## 2018-08-15 RX ORDER — OXYCODONE HYDROCHLORIDE 5 MG/1
5 TABLET ORAL
Status: DISCONTINUED | OUTPATIENT
Start: 2018-08-15 | End: 2018-08-20 | Stop reason: HOSPADM

## 2018-08-15 RX ORDER — OXYCODONE AND ACETAMINOPHEN 5; 325 MG/1; MG/1
1 TABLET ORAL ONCE
Status: COMPLETED | OUTPATIENT
Start: 2018-08-15 | End: 2018-08-15

## 2018-08-15 RX ORDER — HYDRALAZINE HYDROCHLORIDE 20 MG/ML
10 INJECTION INTRAMUSCULAR; INTRAVENOUS ONCE
Status: COMPLETED | OUTPATIENT
Start: 2018-08-15 | End: 2018-08-15

## 2018-08-15 RX ADMIN — OXYCODONE HYDROCHLORIDE AND ACETAMINOPHEN 1 TABLET: 5; 325 TABLET ORAL at 20:14

## 2018-08-15 RX ADMIN — HYDRALAZINE HYDROCHLORIDE 10 MG: 20 INJECTION INTRAMUSCULAR; INTRAVENOUS at 21:53

## 2018-08-15 RX ADMIN — HYDRALAZINE HYDROCHLORIDE 10 MG: 20 INJECTION INTRAMUSCULAR; INTRAVENOUS at 23:32

## 2018-08-15 RX ADMIN — OXYCODONE HYDROCHLORIDE 5 MG: 5 TABLET ORAL at 23:32

## 2018-08-15 NOTE — Clinical Note
Patient Class[de-identified] Observation [754] Type of Bed: Telemetry [19] Reason for Observation: SOB Admitting Diagnosis: SOB (shortness of breath) [460784] Admitting Physician: Jose A Leigh [[id number]] Attending Physician: Ines Reyes

## 2018-08-15 NOTE — IP AVS SNAPSHOT
[address] 
[phone] Patient: Duane Carpenter MRN: [id number] :1985 A check fahad indicates which time of day the medication should be taken. My Medications CHANGE how you take these medications Instructions Each Dose to Equal  
 Morning Noon Evening Bedtime  
 carvedilol 25 mg tablet Commonly known as:  Dionicio Singleton What changed:   
- medication strength 
- how much to take Your last dose was: Your next dose is: Take 1 Tab by mouth two (2) times daily (with meals). 25 mg  
    
   
   
   
  
 cloNIDine HCl 0.3 mg tablet Commonly known as:  CATAPRES What changed:   
- medication strength 
- how much to take - when to take this Your last dose was: Your next dose is: Take 1 Tab by mouth three (3) times daily. 0.3 mg  
    
   
   
   
  
  
CONTINUE taking these medications Instructions Each Dose to Equal  
 Morning Noon Evening Bedtime  
 albuterol 90 mcg/actuation inhaler Commonly known as:  PROVENTIL HFA, VENTOLIN HFA, PROAIR HFA Your last dose was: Your next dose is: Take 1-2 Puffs by inhalation every four (4) hours as needed for Wheezing or Shortness of Breath. 1-2 Puff  
    
   
   
   
  
 amitriptyline 25 mg tablet Commonly known as:  ELAVIL Your last dose was: Your next dose is: Take 25 mg by mouth nightly. 25 mg  
    
   
   
   
  
 amLODIPine 10 mg tablet Commonly known as:  Zandra Darting Your last dose was: Your next dose is: Take 1 Tab by mouth daily. 10 mg  
    
   
   
   
  
 apixaban 5 mg tablet Commonly known as:  Sonia Jenkins Your last dose was: Your next dose is: Take 5 mg by mouth two (2) times a day. Indications: pulmonary thromboembolism 5 mg  
    
   
   
   
  
 azaTHIOprine 50 mg tablet Commonly known as:  [employer]epsi Your last dose was: Your next dose is: Take 50 mg by mouth daily (after breakfast). 50 mg  
    
   
   
   
  
 fluticasone-vilanterol 200-25 mcg/dose inhaler Commonly known as:  BREO ELLIPTA Your last dose was: Your next dose is: Take 1 Puff by inhalation daily. Rinse mouth out after use 1 Puff  
    
   
   
   
  
 gemfibrozil 600 mg tablet Commonly known as:  LOPID Your last dose was: Your next dose is: Take 300 mg by mouth daily. 300 mg  
    
   
   
   
  
 oxyCODONE IR 5 mg immediate release tablet Commonly known as:  Savana Gun Your last dose was: Your next dose is:    
   
   
      
   
   
   
  
 oxyCODONE-acetaminophen 5-325 mg per tablet Commonly known as:  PERCOCET Your last dose was: Your next dose is: Take 1 tablet, two to three times a day, if needed for severe pain. pantoprazole 40 mg tablet Commonly known as:  PROTONIX Your last dose was: Your next dose is: Take 1 Tab by mouth Daily (before breakfast). 40 mg  
    
   
   
   
  
 PLAQUENIL 200 mg tablet Generic drug:  hydroxychloroquine Your last dose was: Your next dose is: Take 200 mg by mouth two (2) times a day. 200 mg  
    
   
   
   
  
 polyethylene glycol 17 gram packet Commonly known as:  [place] Pattie Your last dose was: Your next dose is: Take 17 g by mouth daily. 17 g  
    
   
   
   
  
 predniSONE 5 mg tablet Commonly known as:  Sterling Fuentes Your last dose was: Your next dose is: Take 2 Tabs by mouth daily. 10 mg Senna 8.6 mg tablet Generic drug:  senna Your last dose was: Your next dose is: Take 1 Tab by mouth daily as needed for Constipation. for constipation 1 Tab STOP taking these medications   
 allopurinol 100 mg tablet Commonly known as:  ZYLOPRIM  
   
  
 HYDROmorphone 2 mg tablet Commonly known as:  DILAUDID  
   
  
 levoFLOXacin 500 mg tablet Commonly known as:  Matthew Aloe Where to Get Your Medications Information on where to get these meds will be given to you by the nurse or doctor. ! Ask your nurse or doctor about these medications  
  carvedilol 25 mg tablet  
 cloNIDine HCl 0.3 mg tablet

## 2018-08-15 NOTE — ED TRIAGE NOTES
Pt arrives via ems from home. Pt complains of SOB since this morning. Pt states that she has been ordered O2 tank for at home but has yet to receive one. Pt had dialysis yesterday (Tues, Thurs, Sat). Per EMS pt was 92% RA, speaking full sentences, NSR, systolic 942. HX Lupus, Right upper arm fistula.

## 2018-08-16 ENCOUNTER — APPOINTMENT (OUTPATIENT)
Dept: NUCLEAR MEDICINE | Age: 33
DRG: 682 | End: 2018-08-16
Attending: FAMILY MEDICINE
Payer: MEDICARE

## 2018-08-16 LAB
ANION GAP SERPL CALC-SCNC: 13 MMOL/L (ref 5–15)
ATRIAL RATE: 107 BPM
BASOPHILS # BLD: 0 K/UL (ref 0–0.1)
BASOPHILS NFR BLD: 1 % (ref 0–1)
BUN SERPL-MCNC: 18 MG/DL (ref 6–20)
BUN/CREAT SERPL: 5 (ref 12–20)
CALCIUM SERPL-MCNC: 9.3 MG/DL (ref 8.5–10.1)
CALCULATED P AXIS, ECG09: 38 DEGREES
CALCULATED R AXIS, ECG10: 22 DEGREES
CALCULATED T AXIS, ECG11: -20 DEGREES
CHLORIDE SERPL-SCNC: 100 MMOL/L (ref 97–108)
CO2 SERPL-SCNC: 22 MMOL/L (ref 21–32)
CREAT SERPL-MCNC: 3.63 MG/DL (ref 0.55–1.02)
DIAGNOSIS, 93000: NORMAL
DIFFERENTIAL METHOD BLD: ABNORMAL
EOSINOPHIL # BLD: 0 K/UL (ref 0–0.4)
EOSINOPHIL NFR BLD: 1 % (ref 0–7)
ERYTHROCYTE [DISTWIDTH] IN BLOOD BY AUTOMATED COUNT: 17 % (ref 11.5–14.5)
GLUCOSE BLD STRIP.AUTO-MCNC: 104 MG/DL (ref 65–100)
GLUCOSE SERPL-MCNC: 117 MG/DL (ref 65–100)
HCT VFR BLD AUTO: 41.4 % (ref 35–47)
HGB BLD-MCNC: 12.5 G/DL (ref 11.5–16)
IMM GRANULOCYTES # BLD: 0 K/UL (ref 0–0.04)
IMM GRANULOCYTES NFR BLD AUTO: 1 % (ref 0–0.5)
LYMPHOCYTES # BLD: 0.6 K/UL (ref 0.8–3.5)
LYMPHOCYTES NFR BLD: 15 % (ref 12–49)
MCH RBC QN AUTO: 28.3 PG (ref 26–34)
MCHC RBC AUTO-ENTMCNC: 30.2 G/DL (ref 30–36.5)
MCV RBC AUTO: 93.9 FL (ref 80–99)
MONOCYTES # BLD: 0.2 K/UL (ref 0–1)
MONOCYTES NFR BLD: 5 % (ref 5–13)
NEUTS SEG # BLD: 2.9 K/UL (ref 1.8–8)
NEUTS SEG NFR BLD: 79 % (ref 32–75)
NRBC # BLD: 0 K/UL (ref 0–0.01)
NRBC BLD-RTO: 0 PER 100 WBC
P-R INTERVAL, ECG05: 138 MS
PLATELET # BLD AUTO: 194 K/UL (ref 150–400)
PMV BLD AUTO: 10.4 FL (ref 8.9–12.9)
POTASSIUM SERPL-SCNC: 4 MMOL/L (ref 3.5–5.1)
Q-T INTERVAL, ECG07: 364 MS
QRS DURATION, ECG06: 72 MS
QTC CALCULATION (BEZET), ECG08: 485 MS
RBC # BLD AUTO: 4.41 M/UL (ref 3.8–5.2)
SERVICE CMNT-IMP: ABNORMAL
SODIUM SERPL-SCNC: 135 MMOL/L (ref 136–145)
TROPONIN I SERPL-MCNC: 0.1 NG/ML
VENTRICULAR RATE, ECG03: 107 BPM
WBC # BLD AUTO: 3.7 K/UL (ref 3.6–11)

## 2018-08-16 PROCEDURE — 82962 GLUCOSE BLOOD TEST: CPT

## 2018-08-16 PROCEDURE — 74011250636 HC RX REV CODE- 250/636: Performed by: FAMILY MEDICINE

## 2018-08-16 PROCEDURE — 74011000250 HC RX REV CODE- 250: Performed by: FAMILY MEDICINE

## 2018-08-16 PROCEDURE — 65660000000 HC RM CCU STEPDOWN

## 2018-08-16 PROCEDURE — 74011250636 HC RX REV CODE- 250/636: Performed by: INTERNAL MEDICINE

## 2018-08-16 PROCEDURE — 90935 HEMODIALYSIS ONE EVALUATION: CPT

## 2018-08-16 PROCEDURE — 78582 LUNG VENTILAT&PERFUS IMAGING: CPT

## 2018-08-16 PROCEDURE — A9270 NON-COVERED ITEM OR SERVICE: HCPCS | Performed by: FAMILY MEDICINE

## 2018-08-16 PROCEDURE — 74011250637 HC RX REV CODE- 250/637: Performed by: HOSPITALIST

## 2018-08-16 PROCEDURE — 5A1D70Z PERFORMANCE OF URINARY FILTRATION, INTERMITTENT, LESS THAN 6 HOURS PER DAY: ICD-10-PCS | Performed by: INTERNAL MEDICINE

## 2018-08-16 PROCEDURE — 94640 AIRWAY INHALATION TREATMENT: CPT

## 2018-08-16 PROCEDURE — 74011250637 HC RX REV CODE- 250/637: Performed by: FAMILY MEDICINE

## 2018-08-16 PROCEDURE — 84484 ASSAY OF TROPONIN QUANT: CPT | Performed by: FAMILY MEDICINE

## 2018-08-16 PROCEDURE — 85025 COMPLETE CBC W/AUTO DIFF WBC: CPT | Performed by: FAMILY MEDICINE

## 2018-08-16 PROCEDURE — 74011250636 HC RX REV CODE- 250/636: Performed by: HOSPITALIST

## 2018-08-16 PROCEDURE — 99218 HC RM OBSERVATION: CPT

## 2018-08-16 PROCEDURE — 80048 BASIC METABOLIC PNL TOTAL CA: CPT | Performed by: FAMILY MEDICINE

## 2018-08-16 PROCEDURE — 77030029684 HC NEB SM VOL KT MONA -A

## 2018-08-16 PROCEDURE — A9540 TC99M MAA: HCPCS

## 2018-08-16 PROCEDURE — 94760 N-INVAS EAR/PLS OXIMETRY 1: CPT

## 2018-08-16 PROCEDURE — 36415 COLL VENOUS BLD VENIPUNCTURE: CPT | Performed by: FAMILY MEDICINE

## 2018-08-16 PROCEDURE — 74011636637 HC RX REV CODE- 636/637: Performed by: FAMILY MEDICINE

## 2018-08-16 PROCEDURE — 74011000250 HC RX REV CODE- 250: Performed by: HOSPITALIST

## 2018-08-16 RX ORDER — CLONIDINE HYDROCHLORIDE 0.2 MG/1
0.2 TABLET ORAL 2 TIMES DAILY
Status: DISCONTINUED | OUTPATIENT
Start: 2018-08-16 | End: 2018-08-16

## 2018-08-16 RX ORDER — HYDROMORPHONE HYDROCHLORIDE 5 MG/5ML
0.5 SOLUTION ORAL ONCE
Status: COMPLETED | OUTPATIENT
Start: 2018-08-16 | End: 2018-08-16

## 2018-08-16 RX ORDER — PREDNISONE 10 MG/1
10 TABLET ORAL DAILY
Status: DISCONTINUED | OUTPATIENT
Start: 2018-08-16 | End: 2018-08-20 | Stop reason: HOSPADM

## 2018-08-16 RX ORDER — AMITRIPTYLINE HYDROCHLORIDE 25 MG/1
25 TABLET, FILM COATED ORAL
Status: DISCONTINUED | OUTPATIENT
Start: 2018-08-16 | End: 2018-08-20 | Stop reason: HOSPADM

## 2018-08-16 RX ORDER — HYDROXYCHLOROQUINE SULFATE 200 MG/1
200 TABLET, FILM COATED ORAL 2 TIMES DAILY
Status: DISCONTINUED | OUTPATIENT
Start: 2018-08-16 | End: 2018-08-20 | Stop reason: HOSPADM

## 2018-08-16 RX ORDER — FENTANYL CITRATE 50 UG/ML
25 INJECTION, SOLUTION INTRAMUSCULAR; INTRAVENOUS
Status: DISCONTINUED | OUTPATIENT
Start: 2018-08-16 | End: 2018-08-20 | Stop reason: HOSPADM

## 2018-08-16 RX ORDER — AMLODIPINE BESYLATE 5 MG/1
10 TABLET ORAL DAILY
Status: DISCONTINUED | OUTPATIENT
Start: 2018-08-16 | End: 2018-08-20 | Stop reason: HOSPADM

## 2018-08-16 RX ORDER — FENTANYL CITRATE 50 UG/ML
10 INJECTION, SOLUTION INTRAMUSCULAR; INTRAVENOUS
Status: DISCONTINUED | OUTPATIENT
Start: 2018-08-16 | End: 2018-08-16

## 2018-08-16 RX ORDER — GEMFIBROZIL 600 MG/1
300 TABLET, FILM COATED ORAL DAILY
Status: DISCONTINUED | OUTPATIENT
Start: 2018-08-16 | End: 2018-08-20 | Stop reason: HOSPADM

## 2018-08-16 RX ORDER — ALLOPURINOL 100 MG/1
100 TABLET ORAL
Status: DISCONTINUED | OUTPATIENT
Start: 2018-08-16 | End: 2018-08-16

## 2018-08-16 RX ORDER — AZATHIOPRINE 50 MG/1
50 TABLET ORAL
Status: DISCONTINUED | OUTPATIENT
Start: 2018-08-16 | End: 2018-08-20 | Stop reason: HOSPADM

## 2018-08-16 RX ORDER — FENTANYL CITRATE 50 UG/ML
15 INJECTION, SOLUTION INTRAMUSCULAR; INTRAVENOUS ONCE
Status: COMPLETED | OUTPATIENT
Start: 2018-08-16 | End: 2018-08-16

## 2018-08-16 RX ORDER — CARVEDILOL 12.5 MG/1
12.5 TABLET ORAL 2 TIMES DAILY WITH MEALS
Status: DISCONTINUED | OUTPATIENT
Start: 2018-08-16 | End: 2018-08-16

## 2018-08-16 RX ORDER — SODIUM CHLORIDE 0.9 % (FLUSH) 0.9 %
5-10 SYRINGE (ML) INJECTION AS NEEDED
Status: DISCONTINUED | OUTPATIENT
Start: 2018-08-16 | End: 2018-08-20 | Stop reason: HOSPADM

## 2018-08-16 RX ORDER — SODIUM CHLORIDE 0.9 % (FLUSH) 0.9 %
5-10 SYRINGE (ML) INJECTION EVERY 8 HOURS
Status: DISCONTINUED | OUTPATIENT
Start: 2018-08-16 | End: 2018-08-20 | Stop reason: HOSPADM

## 2018-08-16 RX ORDER — CLONIDINE HYDROCHLORIDE 0.2 MG/1
0.2 TABLET ORAL 3 TIMES DAILY
Status: DISCONTINUED | OUTPATIENT
Start: 2018-08-16 | End: 2018-08-16

## 2018-08-16 RX ORDER — HYDRALAZINE HYDROCHLORIDE 20 MG/ML
20 INJECTION INTRAMUSCULAR; INTRAVENOUS
Status: DISCONTINUED | OUTPATIENT
Start: 2018-08-16 | End: 2018-08-20 | Stop reason: HOSPADM

## 2018-08-16 RX ORDER — CARVEDILOL 12.5 MG/1
25 TABLET ORAL 2 TIMES DAILY WITH MEALS
Status: DISCONTINUED | OUTPATIENT
Start: 2018-08-16 | End: 2018-08-20 | Stop reason: HOSPADM

## 2018-08-16 RX ORDER — IPRATROPIUM BROMIDE AND ALBUTEROL SULFATE 2.5; .5 MG/3ML; MG/3ML
3 SOLUTION RESPIRATORY (INHALATION)
Status: DISCONTINUED | OUTPATIENT
Start: 2018-08-16 | End: 2018-08-17

## 2018-08-16 RX ORDER — LABETALOL HYDROCHLORIDE 5 MG/ML
20 INJECTION, SOLUTION INTRAVENOUS ONCE
Status: COMPLETED | OUTPATIENT
Start: 2018-08-16 | End: 2018-08-16

## 2018-08-16 RX ORDER — ONDANSETRON 2 MG/ML
4 INJECTION INTRAMUSCULAR; INTRAVENOUS
Status: DISCONTINUED | OUTPATIENT
Start: 2018-08-16 | End: 2018-08-20 | Stop reason: HOSPADM

## 2018-08-16 RX ORDER — FENTANYL CITRATE 50 UG/ML
25 INJECTION, SOLUTION INTRAMUSCULAR; INTRAVENOUS ONCE
Status: COMPLETED | OUTPATIENT
Start: 2018-08-16 | End: 2018-08-16

## 2018-08-16 RX ORDER — LOSARTAN POTASSIUM 50 MG/1
50 TABLET ORAL DAILY
Status: DISCONTINUED | OUTPATIENT
Start: 2018-08-16 | End: 2018-08-20 | Stop reason: HOSPADM

## 2018-08-16 RX ADMIN — LOSARTAN POTASSIUM 50 MG: 50 TABLET ORAL at 17:04

## 2018-08-16 RX ADMIN — CARVEDILOL 25 MG: 12.5 TABLET, FILM COATED ORAL at 15:42

## 2018-08-16 RX ADMIN — HYDRALAZINE HYDROCHLORIDE 20 MG: 20 INJECTION INTRAMUSCULAR; INTRAVENOUS at 10:49

## 2018-08-16 RX ADMIN — HYDROXYCHLOROQUINE SULFATE 200 MG: 200 TABLET, FILM COATED ORAL at 08:09

## 2018-08-16 RX ADMIN — ONDANSETRON 4 MG: 2 INJECTION, SOLUTION INTRAMUSCULAR; INTRAVENOUS at 06:52

## 2018-08-16 RX ADMIN — CLONIDINE HYDROCHLORIDE 0.2 MG: 0.2 TABLET ORAL at 08:09

## 2018-08-16 RX ADMIN — FENTANYL CITRATE 10 MCG: 50 INJECTION, SOLUTION INTRAMUSCULAR; INTRAVENOUS at 10:22

## 2018-08-16 RX ADMIN — HYDROXYCHLOROQUINE SULFATE 200 MG: 200 TABLET, FILM COATED ORAL at 17:04

## 2018-08-16 RX ADMIN — IPRATROPIUM BROMIDE AND ALBUTEROL SULFATE 3 ML: .5; 3 SOLUTION RESPIRATORY (INHALATION) at 23:33

## 2018-08-16 RX ADMIN — AMITRIPTYLINE HYDROCHLORIDE 25 MG: 25 TABLET, FILM COATED ORAL at 01:51

## 2018-08-16 RX ADMIN — OXYCODONE HYDROCHLORIDE 5 MG: 5 TABLET ORAL at 06:56

## 2018-08-16 RX ADMIN — HYDROMORPHONE HYDROCHLORIDE 0.5 MG: 5 LIQUID ORAL at 04:49

## 2018-08-16 RX ADMIN — IPRATROPIUM BROMIDE AND ALBUTEROL SULFATE 3 ML: .5; 3 SOLUTION RESPIRATORY (INHALATION) at 20:42

## 2018-08-16 RX ADMIN — APIXABAN 5 MG: 2.5 TABLET, FILM COATED ORAL at 17:04

## 2018-08-16 RX ADMIN — FENTANYL CITRATE 25 MCG: 50 INJECTION, SOLUTION INTRAMUSCULAR; INTRAVENOUS at 05:45

## 2018-08-16 RX ADMIN — IPRATROPIUM BROMIDE AND ALBUTEROL SULFATE 3 ML: .5; 3 SOLUTION RESPIRATORY (INHALATION) at 08:48

## 2018-08-16 RX ADMIN — CLONIDINE HYDROCHLORIDE 0.3 MG: 0.2 TABLET ORAL at 21:44

## 2018-08-16 RX ADMIN — FENTANYL CITRATE 25 MCG: 50 INJECTION, SOLUTION INTRAMUSCULAR; INTRAVENOUS at 15:40

## 2018-08-16 RX ADMIN — CLONIDINE HYDROCHLORIDE 0.2 MG: 0.2 TABLET ORAL at 14:52

## 2018-08-16 RX ADMIN — Medication 10 ML: at 06:57

## 2018-08-16 RX ADMIN — AMLODIPINE BESYLATE 10 MG: 5 TABLET ORAL at 08:09

## 2018-08-16 RX ADMIN — OXYCODONE HYDROCHLORIDE 5 MG: 5 TABLET ORAL at 14:32

## 2018-08-16 RX ADMIN — HYDRALAZINE HYDROCHLORIDE 10 MG: 20 INJECTION INTRAMUSCULAR; INTRAVENOUS at 05:49

## 2018-08-16 RX ADMIN — Medication 10 ML: at 21:46

## 2018-08-16 RX ADMIN — ALLOPURINOL 100 MG: 100 TABLET ORAL at 08:09

## 2018-08-16 RX ADMIN — FENTANYL CITRATE 15 MCG: 50 INJECTION, SOLUTION INTRAMUSCULAR; INTRAVENOUS at 11:26

## 2018-08-16 RX ADMIN — APIXABAN 5 MG: 2.5 TABLET, FILM COATED ORAL at 08:09

## 2018-08-16 RX ADMIN — IPRATROPIUM BROMIDE AND ALBUTEROL SULFATE 3 ML: .5; 3 SOLUTION RESPIRATORY (INHALATION) at 13:50

## 2018-08-16 RX ADMIN — Medication 10 ML: at 04:50

## 2018-08-16 RX ADMIN — CARVEDILOL 12.5 MG: 12.5 TABLET, FILM COATED ORAL at 06:25

## 2018-08-16 RX ADMIN — AMITRIPTYLINE HYDROCHLORIDE 25 MG: 25 TABLET, FILM COATED ORAL at 21:45

## 2018-08-16 RX ADMIN — GEMFIBROZIL 300 MG: 600 TABLET ORAL at 08:24

## 2018-08-16 RX ADMIN — PREDNISONE 10 MG: 10 TABLET ORAL at 08:09

## 2018-08-16 RX ADMIN — AZATHIOPRINE 50 MG: 50 TABLET ORAL at 08:24

## 2018-08-16 RX ADMIN — FENTANYL CITRATE 25 MCG: 50 INJECTION, SOLUTION INTRAMUSCULAR; INTRAVENOUS at 20:39

## 2018-08-16 RX ADMIN — LABETALOL HYDROCHLORIDE 20 MG: 5 INJECTION INTRAVENOUS at 17:05

## 2018-08-16 NOTE — CONSULTS
Seen and examined  Thanks for the consult  A/P:ESRD TTS s.p urgent dialysis early AM        Uncontrolled HTN        Lupus        Leukopenia(allopurinol,imuran)         On higher dose eliquis?   Stop allopurinol  Can d.c from renal standpoint

## 2018-08-16 NOTE — PROGRESS NOTES
8/16/18; 09:15 -   CM attempted to see patient for initial assessment, but patient is currently off the unit in nuclear medicine. CM to follow up with patient at a later time. CRM: Zachariah Guerrier MPH; Z: [phone]    11:30 -   CM participated in IDRs on patient. Patient is a HD patient with Tuesday, Thursday, Saturday schedule. Patient has been historically non-compliant with dialysis and frequently comes to the hospital.  Patient's BP is currently uncontrolled, and she has c/o headache, nausea, and vomiting. Patient is currently on 2 L O2 via NC, but she does not have Home O2. Plan includes weaning patient off of O2, because her SATs are maintaining well. CRM: Zachariah Guerrier MPH; Z: [phone]    13:10 -   CM attempted to see patient at bedside, but patient was sleeping and did not respond to knock on door and name call. CM did independent chart review and discovered:  - Patient has had admission dates: 6/16-6/28, 7/19-7/22 and ED visits: 7/2, 7/26, 7/28  - Patient has family based care aids through Medicaid benefits at 5 hours/day  - Patient is open to [facility]   - Patient lives with parents in single family, single story home with ramp access to the home  - Patient has DME of: walker  - Patient is on HD schedule of Tuesday, Thursday, Saturday at 11:15 at [facility] will follow up with patient tomorrow, 8/17/18.     CRM: Zachariah Guerrier MPH; Z: [phone]

## 2018-08-16 NOTE — DIALYSIS
[facility] Dialysis Team [facility]  [phone]    Vitals   Pre   Post   Assessment   Pre   Post     Temp  Temp: 96.5 °F (35.8 °C) (08/16/18 0248)  96.5 LOC  A/OX3  Follows commands A/OX3  Follows commands   HR   Pulse (Heart Rate): (!) 105 (HD initiated) (08/16/18 0248) 102 Lungs   Coarse/dim  Tachypnic  Coarse/dim     B/P   BP: (!) 225/136 (08/16/18 0248) 199/133 Cardiac   Heart sounds auscultated, pulses palpable  Heart sounds auscultated, pulses palpable   Resp   Resp Rate: 20 (08/16/18 0248) 18 Skin   Warm, dry, intact  Warm, dry, intact   Pain level  Pain Intensity 1: 7 (08/16/18 0130) 10 - Primary RN aware - medicated pt.  Edema    None noted   None noted   Orders:    Duration:   Start:    1650 End:    2043 Total:   2.5 hrs   Dialyzer:   Dialyzer/Set Up Inspection: [employer], Dialyzer - [id number]) (08/16/18 0248)   Parish Hailee Bath:   Dialysate K (mEq/L): 3 (08/16/18 0248)   Ca Bath:   Dialysate CA (mEq/L): 2.5 (08/16/18 0248)   Na/Bicarb:   Dialysate NA (mEq/L): y (08/16/18 0248)   Target Fluid Removal:   Goal/Amount of Fluid to Remove (mL): 3500 mL (08/16/18 0248)   Access     Type & Location:   PETER AVG, +t/b, accessed with 15 g needles, +flash/aspiration/flush with NS   Labs     Obtained/Reviewed   Critical Results Called   Date when labs were drawn-  Hgb-    HGB   Date Value Ref Range Status   08/15/2018 11.7 11.5 - 16.0 g/dL Final     K-    Potassium   Date Value Ref Range Status   08/15/2018 4.9 3.5 - 5.1 mmol/L Final     Comment:     SPECIMEN HEMOLYZED, RESULTS MAY BE AFFECTED     Ca-   Calcium   Date Value Ref Range Status   08/15/2018 9.0 8.5 - 10.1 MG/DL Final     Bun-   BUN   Date Value Ref Range Status   08/15/2018 36 (H) 6 - 20 MG/DL Final     Creat-   Creatinine   Date Value Ref Range Status   08/15/2018 5.81 (H) 0.55 - 1.02 MG/DL Final        Medications/ Blood Products Given     Name   Dose   Route and Time     None ordered                Blood Volume Processed (BVP): 65.5 L Net Fluid   Removed:  2906 ml   Comments   Time Out Done: 9618  Primary Nurse Rpt Pre: FEDERICA Karimi RN  Primary Nurse Rpt Post: FEDERICA Karimi RN  Pt Education: Procedural, Numbers on the machine and what they mean  Care Plan: Stat, per nephrologist  Tx Summary: Pt. Tolerated tx poorly d/t hypertension/vomiting/uncontrolled pain requiring medicine during tx and for tx to end 30 min early. On call physician - Ted Dhaliwal notified. At the end all blood returned with 300 ml of NS. Needles removed X2 and hemostasis achieved via hand held pressure. Clean dressing applied. Admiting Diagnosis: SOB  [facility]  Consent signed - Informed Consent Verified: Yes (08/16/18 0248)  [facility] Consent - Obtained  Hepatitis Status- Unknown  Machine #- Machine Number: [id number] (08/16/18 0248)  Telemetry status- Remotely monitored  Pre-dialysis wt. - Pre-Dialysis Weight: 56.4 kg (124 lb 5.4 oz) (08/16/18 0248)

## 2018-08-16 NOTE — PROGRESS NOTES
Patient's BP still elevated. Dr. Herminia Leblanc notified again. Gave orders to give Clonidine early.

## 2018-08-16 NOTE — H&P
[facility]  HISTORY AND PHYSICAL      Leonora Hedrick.  MR#: [id number]  : 1985  ACCOUNT #: [de-identified]   ADMIT DATE: 08/15/2018    CHIEF COMPLAINT:  Shortness of breath. HISTORY OF PRESENT ILLNESS:  The patient is a 28-year-old female with past medical history of lupus, pulmonary embolism, anemia, hypercholesterolemia, peritoneal dialysis, history of ESRD, thromboembolism, GERD, and hypertension, who presents to the hospital with the above-mentioned symptoms. The patient reports that she gets dialyzed Tuesday, Thursday, and Saturday. Got a full dialysis done yesterday. She was feeling fine. This morning, woke up and was quite short of breath. The patient reports that she was short of breath to the point that she could not breathe well. It persisted all day today. She got concerned and decided to come to the hospital.  The patient reported that she has no chest pain associated with her symptoms. Does not have a cough, runny nose, sore throat, trouble swallowing, or any other concerns. The patient reports that she has a history of thromboembolism and is still taking her Eliquis on a regular basis. The patient reports that 2 weeks ago, her PCP told her that she needs to be on oxygen. The patient reports that the oxygen has not been delivered to her home. Patient reports that she had PE about 6 years ago when she had similar symptoms. The patient reports that she is compliant with her Eliquis. Six years ago, she had pulmonary embolism and her symptoms are quite similar to what happened last time she had similar symptoms. The patient reports that she has mild cough associated with her symptoms and reports she has diffuse pain consistent with lupus flare. The patient reports that she takes Percocet for the same. The patient denies any other complaints or problems.   Denies any headache, blurry vision, sore throat, trouble swallowing, trouble with speech, chest pain, abdominal pain, constipation, diarrhea, urinary symptoms, focal or generalized neurological weakness, recent travel, sick contacts, falls, injuries, hematemesis, melena, hemoptysis, or any other concerns or problems. PAST MEDICAL HISTORY:  See above. HOME MEDICATIONS:  Currently, the patient is on Roxicodone 5 mg b.i.d., clonidine 0.2 mg b.i.d., Percocet 5/325 mg t.i.d. as needed, Coreg 12.5 mg b.i.d., amlodipine 10 mg daily, Eliquis 5 mg b.i.d., prednisone 10 mg daily,  Senna, amitriptyline 25 mg nightly, Breo Ellipta 1 puff daily, gemfibrozil 300 mg daily, azothiopine 50 mg daily, albuterol, hydroxychloroquine 200 mg b.i.d., allopurinol, pantoprazole 40 mg daily. SOCIAL HISTORY:  Denies tobacco abuse, alcohol use, IV drug use. Lives at home. ALLERGIES:  COMPAZINE. FAMILY HISTORY:  Discussed. Her father had history of diabetes, hypertension, and cancer. Mother had history of diabetes. REVIEW OF SYSTEMS:  All systems were reviewed, found to be essentially negative except for the symptoms mentioned above. PHYSICAL EXAMINATION:  VITAL SIGNS:  Temperature 98.3, pulse 103, respiratory rate 31, blood pressure 179/126, pulse oximetry 97% on room air. GENERAL:  Alert x3, awake. Mildly distressed, pleasant female, appears to be stated age. HEENT:  Pupils equal, reactive to light. Dry mucous membranes. Tympanic membranes clear. NECK:  Supple. CHEST:  Decreased basal breath sounds. CARDIOVASCULAR:  S1, S2 were heard. ABDOMEN:  Soft, tender to palpation diffusely. No rebound, mild guarding. Bowel sounds are hypoactive. EXTREMITIES:  No clubbing. No cyanosis. No edema. NEUROPSYCHIATRIC:  Pleasant mood and affect. Cranial nerves II-XII grossly intact. Sensory is grossly within normal limits. DTRs 2+/4. Strength 5/5. SKIN:  Warm. LABORATORY DATA:  White count 2.9, hemoglobin 11.7, hematocrit 39.6, platelets 212.   Sodium 138, potassium 4.9, chloride 97, bicarb 28, anion gap 30, glucose 97, BUN 36, creatinine 5.81, calcium 9.0, bilirubin total 0.7, ALT 18, AST 47, alkaline phosphatase 42. Troponin 0.07. BNP greater than 35,000. Chest x-ray shows persistent although improved right pulmonary opacity. EKG shows sinus tachycardia. ASSESSMENT AND PLAN:  1. Shortness of breath, most likely seems to be volume overload. The patient will be admitted on a telemetry bed. We will start patient on DuoNeb, continuous pulse oximetry monitoring. Nephrology has been consulted and may consider getting dialysis. We will defer to Nephrology. We will get a VQ scan to make sure that the patient does not have a PE, as she is complaining that her symptoms are similar to previous. We will provide supportive care and continue to monitor. Oxygen support, telemetry monitoring. We will get troponins and reassess as needed. Further intervention will be per hospital course. Continue monitoring. 2.  End-stage renal disease, on dialysis. Nephrology has been consulted. On dialysis Tuesday, Thursday, Saturday. Continue to monitor. 3.  History of lupus. Continue home medications and continue to closely monitor. Provide pain medication and reassess as needed. 4.  Hypertension, poorly controlled. We will provide hydralazine p.r.n. Continue home medications and continue to monitor. 5.  History of PE. Continue Eliquis. 6.  History of gastroesophageal reflux disease. Continue home medications. 7.  Gastrointestinal and deep vein thrombosis prophylaxis. The patient is on Eliquis.       Amara Saucedo MD MM/NUHA  D: 08/15/2018 21:49     T: 08/15/2018 22:37  JOB #: [id number]

## 2018-08-16 NOTE — CONSULTS
ESRD  Volume overload/tachypnea - 5 th ER visit/hospitalization since 7/1  Malignant HTN    Plan: acute HD as soon as possible with volume off (HD orders written and Lily Lanza called). Full consult in the morning.      Marisa Petersen

## 2018-08-16 NOTE — ED PROVIDER NOTES
HPI Comments: 29 y.o. female with past medical history significant for lupus, PE, anemia, hypercholesterolemia, peritoneal dialysis status, ESRD, thromboembolus, GERD and HTN who presents via EMS with chief complaint of SOB. Pt reports orthopnea and explains she awoke multiple times during the night last night secondary to SOB. Pt states SOB has persisted throughout the day today. Pt notes she was seen 2 weeks ago by her PCP and told that O2 would be delivered to her house within the week but it has not arrived. Pt reports PE ~6 years ago and states her current sx feel similar. Of note, she is complaint with her Eliquis. Pt also notes decreased fluid intake today due to concern of fluid overload as well as recent nonproductive mild cough, rhinorrhea and abd distention. Pt reports current diffuse pain consistent with a lupus flare up. Pt states she takes percocet with tylenol BID for pain and states her last dose was 2 hours ago. Pt states she had dialysis yesterday and was able to complete treatment; she is scheduled to have it again tomorrow. Pt also notes intermittent abd tenderness for several months, which remains unchanged today. Pt denies CP, nausea, vomiting, diarrhea, fever, sore throat and dysuria. Pt denies changes in her diet. There are no other acute medical concerns at this time. Social hx: Denies tobacco use; Denies EtOH use; Denies illicit drug use  PCP: Sean Leonardo NP  Nephrologist: Travis Martines MD    Note written by Boy Martins, as dictated by Stefania Lemon DO 7:23 PM      The history is provided by the patient. No  was used.         Past Medical History:   Diagnosis Date    Anemia     secondary to lupus    Asthma     no inhaler use in past 2 to 3 years    Carditis     Chronic kidney disease     ESRD    Chronic pain     DDD (degenerative disc disease), lumbar     ESRD (end stage renal disease) (HCC)     GERD (gastroesophageal reflux disease)     Hemodialysis patient [facility]) 2017    [address]  Tuesday,  Thursday,  and Saturday.  Hypercholesterolemia     Hypertension     Intractable nausea and vomiting 10/21/2015    Long term (current) use of anticoagulants     Lupus (systemic lupus erythematosus) (HCC)     Malignant hypertension with chronic kidney disease stage V [facility]     Peritoneal dialysis status ([facility] 10/2015    x 2 years Stopped 2017 due to infection and removed.  Poor historian 2018    With medications    Thromboembolus ([facility] 2013    lungs    Transfusion history     Last Transfusion 2017  at [facility]       Past Surgical History:   Procedure Laterality Date    HX  SECTION  11/2006    x1    HX OTHER SURGICAL  9/16/15    INSERTION PD CATH; Removed 2017    HX VASCULAR ACCESS Right 2017    Double-Lumen henry catheter upper chest         Family History:   Problem Relation Age of Onset    Diabetes Father     Hypertension Father     Cancer Other      aunt with breast cancer    Diabetes Mother        Social History     Social History    Marital status: SINGLE     Spouse name: N/A    Number of children: N/A    Years of education: N/A     Occupational History    Not on file. Social History Main Topics    Smoking status: Never Smoker    Smokeless tobacco: Never Used    Alcohol use No    Drug use: Yes     Special: Prescription, OTC    Sexual activity: Not Currently     Other Topics Concern     Service No    Blood Transfusions No    Caffeine Concern No    Occupational Exposure No    Hobby Hazards No    Sleep Concern No    Stress Concern No    Weight Concern No    Special Diet No    Back Care Yes     low back pain    Exercise No    Bike Helmet No    Seat Belt Yes    Self-Exams No     Social History Narrative    Lives with parents and daughter.          ALLERGIES: Compazine [prochlorperazine edisylate]    Review of Systems   Constitutional: Negative for activity change, appetite change, chills and fever. HENT: Positive for rhinorrhea. Negative for congestion, sinus pressure, sneezing and sore throat. Eyes: Negative for photophobia and visual disturbance. Respiratory: Positive for cough and shortness of breath. Cardiovascular: Negative for chest pain. Gastrointestinal: Positive for abdominal distention. Negative for abdominal pain, blood in stool, constipation, diarrhea, nausea and vomiting. Genitourinary: Negative for difficulty urinating, dysuria, flank pain, frequency, hematuria, menstrual problem, urgency, vaginal bleeding and vaginal discharge. Musculoskeletal: Positive for myalgias. Negative for arthralgias, back pain and neck pain. Skin: Negative for rash and wound. Neurological: Negative for syncope, weakness, numbness and headaches. Psychiatric/Behavioral: Negative for self-injury and suicidal ideas. All other systems reviewed and are negative. Vitals:    08/15/18 1902 08/15/18 1930   BP: (!) 188/130 (!) 181/129   Pulse: (!) 110 (!) 108   Resp: 24 22   Temp: 98.3 °F (36.8 °C)    SpO2: 97% 94%   Weight: 60.4 kg (133 lb 2.5 oz)    Height: 5' 5\" (1.651 m)             Physical Exam   Constitutional: She is oriented to person, place, and time. She appears well-developed and well-nourished. No distress. Chronically ill appearing   HENT:   Head: Normocephalic and atraumatic. Mouth/Throat: Oropharynx is clear and moist and mucous membranes are normal.   Eyes: Conjunctivae and EOM are normal. Pupils are equal, round, and reactive to light. Neck: Neck supple. Cardiovascular: Regular rhythm and normal heart sounds. Tachycardia present. Pulmonary/Chest: Effort normal. She has rales (right basilar). Abdominal: Soft. She exhibits no distension. There is tenderness (mild throughout (this is chronic and has been going on for several months)). There is no rebound and no guarding.    No peritoneal signs   Musculoskeletal: She exhibits no edema or tenderness. Neurological: She is alert and oriented to person, place, and time. Skin: Skin is warm and dry. She is not diaphoretic. Nursing note and vitals reviewed. Note written by Boy Oneil, as dictated by Grazyna Mcintyre DO 7:23 PM    MDM  29 y.o. Female presents with concern for fluid overload and need for repeat dialysis given significant exertional IVY and orthopnea. Labs returned showing mildly elevated trop at 0.07 and markedly elevated BNP at >59769 with previous values similar to this noted in the chart. CBC returned showing WBC of 2.9 but similar to previous values. Hemoglobin is 11.7, CMP shows elevated creatinine and BUN consistent with ESRD but no acute electrolyte abnormalities. CXR shows persistent although improved pulmonary opacity. Concern for fluid overload given sx and need for repeat dialysis. ED Course       Procedures    ED EKG interpretation:  Rhythm: sinus tachycardia; and regular . Rate (approx.): 107; ST/T wave: T wave flattening inferior laterally, No ST elevation or depression. Note written by Boy Oneil, as dictated by Grazyna Mcintyre DO 7:41 PM    CONSULT NOTE:  8:59 PM Grazyna Mcintyre DO spoke with Dr. Ciara Llanes, Consult for Hospitalist.  Discussed available diagnostic tests and clinical findings. He will see and admit. CONSULT NOTE:  9:59 PM Grazyna Mcintyre DO spoke with Dr. Swapnil Cash MD, Consult for Nephrology. Discussed available diagnostic tests and clinical findings. They will dialyze pt tonight.

## 2018-08-16 NOTE — PROGRESS NOTES
Dr. Chan Garcia notified of patient's elevated blood pressure. Stated that she should receive her morning BP meds and then recheck. Patient received all morning meds. Patient is currently off of the floor for a VQ scan. Will check BP when she returns.

## 2018-08-16 NOTE — PROGRESS NOTES
0130BP 187/127. Advised by CCU DANITZA Lemus to start AM antihypertensive meds. Pharmacy notified. 3393 Dialysis nurse on unit would like to hold off on clonidine and amlodipine,  0400 HD in progress. /130. Dr Juan Manuel Castro on unit. Order for Hydromorphone0.5mg po.

## 2018-08-16 NOTE — PROGRESS NOTES
Paged Dr Guerrero Pean patient vomiting needs something for nausea he called back and order given for Zofran 4 mg Q6 PRN

## 2018-08-16 NOTE — PROGRESS NOTES
Patient blood pressure has decreased to 140s/90s. Patient resting. Dr. Gan Nurse notified of new blood pressure.

## 2018-08-16 NOTE — PHYSICIAN ADVISORY
Letter of Status Determination:   Recommend hospitalization status upgraded from   OBSERVATION  to INPATIENT  Status     Pt Name:  Girish Salinas   MR#   [address] [facility] [facility] # [id number] /  [id number]  Payor: Linder Cheadle / Plan: [address] [place] [address] / Product Type: Medicare /    EDGARDO#  [id number]   [address] [place] [address]  350/01  @ [facility]   Hospitalization date  8/15/2018  6:54 PM   Current Attending Physician  Estrellita Ferraro MD   Principal diagnosis  <principal problem not specified>   Shortness of breath   Clinicals  29 y.o. y.o  female hospitalized with above diagnosis   The pt suffers from ESRD among numerous other chronic illnesses/   She was probably volume overloaded for which she went through HD urgently. Her condition is improving. Her care is expected to extend beyond two Midnights due to appropriate and medically necessary care in acute care setting. [place] (MCG) criteria   Does  NOT  apply    STATUS DETERMINATION  This patient is at high risk of adverse events and deterioration based on documented clinical data, comorbid conditions and current acute care course. Ms. Girish Salinas is expected to meet Inpatient Admission status criteria in accordance with CMS regulation Section 43 .3. Specifically, due to medical necessity the patient's stay is expected to exceed Two Midnights. It is our recommendation that this patient's hospitalization status should be upgraded from  OBSERVATION to INPATIENT status. The final decision of the patient's hospitalization status depends on the attending physician's judgment.          Additional comments     Payor: Linder Cheadle / Plan: [place] MEDICARE PART A & B / Product Type: Medicare /         Michelle Morales MD [address] [facility]   President Medical Staff, [address] [place] [address]  [phone]        [phone]    .

## 2018-08-16 NOTE — PROGRESS NOTES
Hospitalist Progress Note  Amaury Hess MD  Answering service: [phone] from in house phone  Cell: [phone]      Date of Service:  2018  NAME:  Venus Box  :  1985  MRN:  [id number]      Admission Summary:   The patient is a 36-year-old female with past medical history of lupus, pulmonary embolism, anemia, hypercholesterolemia, peritoneal dialysis, history of ESRD, thromboembolism, GERD, and hypertension, who presents to the hospital with the above-mentioned symptoms. The patient reports that she gets dialyzed Tuesday, Thursday, and Saturday. Interval history / Subjective:   Headache bifrontal 8/10, generalized pain 8/10, no left side chest pain or blurring vision     Assessment & Plan:     Shortness of breath possible due to volume overload   -no cough, left side chest pain  -chest x ray persistent although improved right pulmonary opacity.   -V/Q scan   -continue prn oxygen support, duo neb, monitor pulse ox    ESRD   -PD at home  -Had HD this morining  -nephrologist on board     HTN  -BP running high, continue norvasc 10 mg daily, increase coreg to 25 mg bid on , increase clonidine to 0.3 mg tid, add losartan 50 mg po q daily on , on prn iv hydralazine, monitor BP    Elevated troponin   -no chest pain,   -troponin 0.1  -ekg sinus rhythm, vent rate 107 bpm, non specific st t wave  -patient with hx of SLE, will do echo    Hx of lupus   -continue prednisone, plaquenil and umuran     Hx of PE  -continue eliquis  -V/Q scan ordered     Chronic pain  -on amitriptyline,  fentanyl , oxycodone   -complain pain all over 8/10  -afebrile, no skin rash      Code status: Full Code  DVT prophylaxis: on Chuy Shereen Astrid 1487 discussed with: Patient/Family, Nurse and   Disposition: Home w/Family     Hospital Problems  Date Reviewed: 2018          Codes Class Noted POA    SOB (shortness of breath) ICD-10-CM: R06.02  ICD-9-CM: 786.05  8/15/2018 Unknown              Vital Signs:    Last 24hrs VS reviewed since prior progress note. Most recent are:  Visit Vitals    BP (!) 187/116    Pulse (!) 109    Temp 96.5 °F (35.8 °C) (Oral)    Resp 18    Ht 5' 5\" (1.651 m)    Wt 56.4 kg (124 lb 5.4 oz)    SpO2 100%    Breastfeeding No    BMI 20.69 kg/m2         Intake/Output Summary (Last 24 hours) at 08/16/18 3605  Last data filed at 08/16/18 5874   Gross per 24 hour   Intake                0 ml   Output             2906 ml   Net            -2906 ml        Physical Examination:             Constitutional:  No acute distress, cooperative, pleasant    ENT:  Oral mucous moist, oropharynx benign. Neck supple,    Resp:  CTA bilaterally. No wheezing/rhonchi/rales. No accessory muscle use   CV:  Regular rhythm, normal rate, no murmurs, gallops, rubs    GI:  Soft, non distended, non tender. normoactive bowel sounds, no hepatosplenomegaly     Musculoskeletal:  No edema,    Neurologic:  Moves all extremities. AAOx3, CN II-XII reviewed     Skin:  Good turgor, no rashes or ulcers       Data Review:    Review and/or order of clinical lab test      Labs:     Recent Labs      08/16/18   0536  08/15/18   1912   WBC  3.7  2.9*   HGB  12.5  11.7   HCT  41.4  39.6   PLT  194  180     Recent Labs      08/16/18   0536  08/15/18   1912   NA  135*  138   K  4.0  4.9   CL  100  97   CO2  22  28   BUN  18  36*   CREA  3.63*  5.81*   GLU  117*  97   CA  9.3  9.0     Recent Labs      08/15/18   1912   SGOT  47*   ALT  18   AP  92   TBILI  0.7   TP  8.8*   ALB  3.0*   GLOB  5.8*     No results for input(s): INR, PTP, APTT in the last 72 hours. No lab exists for component: INREXT   No results for input(s): FE, TIBC, PSAT, FERR in the last 72 hours. Lab Results   Component Value Date/Time    Folate 13.7 06/17/2018 03:00 PM      No results for input(s): PH, PCO2, PO2 in the last 72 hours.   Recent Labs      08/16/18   0536  08/15/18   7180 08/15/18   1918   TROIQ  0.10*  0.07*  0.07*     Lab Results   Component Value Date/Time    Cholesterol, total 117 09/25/2015 02:02 PM    HDL Cholesterol 31 (L) 09/25/2015 02:02 PM    LDL, calculated 57 09/25/2015 02:02 PM    Triglyceride 146 09/25/2015 02:02 PM    CHOL/HDL Ratio 7.7 (H) 05/31/2009 10:30 AM     Lab Results   Component Value Date/Time    Glucose (POC) 164 (H) 07/22/2018 11:43 AM    Glucose (POC) 138 (H) 07/21/2018 09:22 PM    Glucose (POC) 111 (H) 07/21/2018 04:57 PM    Glucose (POC) 105 (H) 07/21/2018 11:38 AM    Glucose (POC) 112 (H) 07/21/2018 06:49 AM     Lab Results   Component Value Date/Time    Color YELLOW/STRAW 08/12/2016 09:06 PM    Appearance CLEAR 08/12/2016 09:06 PM    Specific gravity 1.017 08/12/2016 09:06 PM    Specific gravity 1.010 07/11/2016 12:44 PM    pH (UA) 7.0 08/12/2016 09:06 PM    Protein 300 (A) 08/12/2016 09:06 PM    Glucose NEGATIVE  08/12/2016 09:06 PM    Ketone TRACE (A) 08/12/2016 09:06 PM    Bilirubin NEGATIVE  07/11/2016 12:44 PM    Urobilinogen 1.0 08/12/2016 09:06 PM    Nitrites NEGATIVE  08/12/2016 09:06 PM    Leukocyte Esterase NEGATIVE  08/12/2016 09:06 PM    Epithelial cells MODERATE (A) 08/12/2016 09:06 PM    Bacteria 1+ (A) 08/12/2016 09:06 PM    WBC 0-4 08/12/2016 09:06 PM    RBC 0-5 08/12/2016 09:06 PM         Medications Reviewed:     Current Facility-Administered Medications   Medication Dose Route Frequency    allopurinol (ZYLOPRIM) tablet 100 mg  100 mg Oral DAILY AFTER BREAKFAST    amitriptyline (ELAVIL) tablet 25 mg  25 mg Oral QHS    amLODIPine (NORVASC) tablet 10 mg  10 mg Oral DAILY    apixaban (ELIQUIS) tablet 5 mg  5 mg Oral BID    azaTHIOprine (IMURAN) tablet 50 mg  50 mg Oral DAILY AFTER BREAKFAST    carvedilol (COREG) tablet 12.5 mg  12.5 mg Oral BID WITH MEALS    cloNIDine HCl (CATAPRES) tablet 0.2 mg  0.2 mg Oral BID    gemfibrozil (LOPID) tablet 300 mg  300 mg Oral DAILY    hydroxychloroquine (PLAQUENIL) tablet 200 mg  200 mg Oral BID    predniSONE (DELTASONE) tablet 10 mg  10 mg Oral DAILY    sodium chloride (NS) flush 5-10 mL  5-10 mL IntraVENous Q8H    sodium chloride (NS) flush 5-10 mL  5-10 mL IntraVENous PRN    albuterol-ipratropium (DUO-NEB) 2.5 MG-0.5 MG/3 ML  3 mL Nebulization Q4H RT    ondansetron (ZOFRAN) injection 4 mg  4 mg IntraVENous Q6H PRN    oxyCODONE IR (ROXICODONE) tablet 5 mg  5 mg Oral Q6H PRN    hydrALAZINE (APRESOLINE) 20 mg/mL injection 10 mg  10 mg IntraVENous Q6H PRN     ______________________________________________________________________  EXPECTED LENGTH OF STAY: - - -  ACTUAL LENGTH OF STAY:          0                 Bonnie Heard MD

## 2018-08-17 PROCEDURE — 74011250636 HC RX REV CODE- 250/636: Performed by: FAMILY MEDICINE

## 2018-08-17 PROCEDURE — 74011250636 HC RX REV CODE- 250/636: Performed by: HOSPITALIST

## 2018-08-17 PROCEDURE — 74011000250 HC RX REV CODE- 250: Performed by: FAMILY MEDICINE

## 2018-08-17 PROCEDURE — 94640 AIRWAY INHALATION TREATMENT: CPT

## 2018-08-17 PROCEDURE — 65660000000 HC RM CCU STEPDOWN

## 2018-08-17 PROCEDURE — 74011250637 HC RX REV CODE- 250/637: Performed by: FAMILY MEDICINE

## 2018-08-17 PROCEDURE — 74011636637 HC RX REV CODE- 636/637: Performed by: FAMILY MEDICINE

## 2018-08-17 PROCEDURE — 94760 N-INVAS EAR/PLS OXIMETRY 1: CPT

## 2018-08-17 PROCEDURE — 74011250637 HC RX REV CODE- 250/637: Performed by: HOSPITALIST

## 2018-08-17 RX ORDER — CLONIDINE HYDROCHLORIDE 0.2 MG/1
0.2 TABLET ORAL 3 TIMES DAILY
Status: DISCONTINUED | OUTPATIENT
Start: 2018-08-17 | End: 2018-08-20

## 2018-08-17 RX ORDER — IPRATROPIUM BROMIDE AND ALBUTEROL SULFATE 2.5; .5 MG/3ML; MG/3ML
3 SOLUTION RESPIRATORY (INHALATION)
Status: DISCONTINUED | OUTPATIENT
Start: 2018-08-18 | End: 2018-08-19

## 2018-08-17 RX ADMIN — CARVEDILOL 25 MG: 12.5 TABLET, FILM COATED ORAL at 08:50

## 2018-08-17 RX ADMIN — CLONIDINE HYDROCHLORIDE 0.2 MG: 0.2 TABLET ORAL at 22:32

## 2018-08-17 RX ADMIN — AZATHIOPRINE 50 MG: 50 TABLET ORAL at 08:53

## 2018-08-17 RX ADMIN — OXYCODONE HYDROCHLORIDE 5 MG: 5 TABLET ORAL at 15:27

## 2018-08-17 RX ADMIN — APIXABAN 5 MG: 2.5 TABLET, FILM COATED ORAL at 18:51

## 2018-08-17 RX ADMIN — IPRATROPIUM BROMIDE AND ALBUTEROL SULFATE 3 ML: .5; 3 SOLUTION RESPIRATORY (INHALATION) at 07:54

## 2018-08-17 RX ADMIN — HYDROXYCHLOROQUINE SULFATE 200 MG: 200 TABLET, FILM COATED ORAL at 18:51

## 2018-08-17 RX ADMIN — APIXABAN 5 MG: 2.5 TABLET, FILM COATED ORAL at 08:50

## 2018-08-17 RX ADMIN — IPRATROPIUM BROMIDE AND ALBUTEROL SULFATE 3 ML: .5; 3 SOLUTION RESPIRATORY (INHALATION) at 15:54

## 2018-08-17 RX ADMIN — LOSARTAN POTASSIUM 50 MG: 50 TABLET ORAL at 08:50

## 2018-08-17 RX ADMIN — FENTANYL CITRATE 25 MCG: 50 INJECTION, SOLUTION INTRAMUSCULAR; INTRAVENOUS at 22:47

## 2018-08-17 RX ADMIN — Medication 10 ML: at 22:33

## 2018-08-17 RX ADMIN — Medication 5 ML: at 06:53

## 2018-08-17 RX ADMIN — CLONIDINE HYDROCHLORIDE 0.3 MG: 0.2 TABLET ORAL at 08:50

## 2018-08-17 RX ADMIN — OXYCODONE HYDROCHLORIDE 5 MG: 5 TABLET ORAL at 06:57

## 2018-08-17 RX ADMIN — PREDNISONE 10 MG: 10 TABLET ORAL at 08:50

## 2018-08-17 RX ADMIN — FENTANYL CITRATE 25 MCG: 50 INJECTION, SOLUTION INTRAMUSCULAR; INTRAVENOUS at 18:51

## 2018-08-17 RX ADMIN — FENTANYL CITRATE 25 MCG: 50 INJECTION, SOLUTION INTRAMUSCULAR; INTRAVENOUS at 09:03

## 2018-08-17 RX ADMIN — AMLODIPINE BESYLATE 10 MG: 5 TABLET ORAL at 08:50

## 2018-08-17 RX ADMIN — IPRATROPIUM BROMIDE AND ALBUTEROL SULFATE 3 ML: .5; 3 SOLUTION RESPIRATORY (INHALATION) at 19:40

## 2018-08-17 RX ADMIN — HYDROXYCHLOROQUINE SULFATE 200 MG: 200 TABLET, FILM COATED ORAL at 08:51

## 2018-08-17 RX ADMIN — AMITRIPTYLINE HYDROCHLORIDE 25 MG: 25 TABLET, FILM COATED ORAL at 22:32

## 2018-08-17 RX ADMIN — GEMFIBROZIL 300 MG: 600 TABLET ORAL at 08:52

## 2018-08-17 NOTE — PROGRESS NOTES
RENAL  PROGRESS NOTE        Subjective:   Doing better    Objective:   VITALS SIGNS:    Visit Vitals    BP (!) 158/108 (BP 1 Location: Left arm, BP Patient Position: At rest;Supine)    Pulse 88    Temp 97.3 °F (36.3 °C)    Resp 16    Ht 5' 5\" (1.651 m)    Wt 55.6 kg (122 lb 9.2 oz)    SpO2 94%    Breastfeeding No    BMI 20.4 kg/m2       O2 Device: Room air   O2 Flow Rate (L/min): 2 l/min   Temp (24hrs), Av °F (36.7 °C), Min:97.3 °F (36.3 °C), Max:98.6 °F (37 °C)         PHYSICAL EXAM:  Trace edema    DATA REVIEW:     INTAKE / OUTPUT:   Last shift:         Last 3 shifts: 08/15 1901 -  0700  In: -   Out: 2906   No intake or output data in the 24 hours ending 18 0915      LABS:   Recent Labs      18   0536  08/15/18   1912   WBC  3.7  2.9*   HGB  12.5  11.7   HCT  41.4  39.6   PLT  194  180     Recent Labs      18   0536  08/15/18   1912   NA  135*  138   K  4.0  4.9   CL  100  97   CO2  22  28   GLU  117*  97   BUN  18  36*   CREA  3.63*  5.81*   CA  9.3  9.0   ALB   --   3.0*   TBILI   --   0.7   SGOT   --   47*   ALT   --   18           Assessment:           ESRD TTS s.p urgent dialysis          Uncontrolled HTN        Lupus        Leukopenia(allopurinol,imuran)         On higher dose eliquis      Plan:   HD in AM if still here  Hold allopurinol  Waqar Guo MD

## 2018-08-17 NOTE — PROGRESS NOTES
Problem: Falls - Risk of  Goal: *Absence of Falls  Document Alex Fall Risk and appropriate interventions in the flowsheet.    Outcome: Progressing Towards Goal  Fall Risk Interventions:            Medication Interventions: Patient to call before getting OOB         History of Falls Interventions: Room close to nurse's station

## 2018-08-17 NOTE — PROGRESS NOTES
also notes that patient is anxious to be discharged later today after she is seen by the hospitalist.  She will resume her outpatient dialysis on Saturday am.

## 2018-08-17 NOTE — PROGRESS NOTES
Hospitalist Progress Note  Gudelia Henning MD  Answering service: [phone] OR 36 from in house phone  Cell: [phone]      Date of Service:  2018  NAME:  Yasmeen Hector  :  1985  MRN:  [id number]      Admission Summary:   The patient is a 44-year-old female with past medical history of lupus, pulmonary embolism, anemia, hypercholesterolemia, peritoneal dialysis, history of ESRD, thromboembolism, GERD, and hypertension, who presents to the hospital with the above-mentioned symptoms. The patient reports that she gets dialyzed Tuesday, Thursday, and Saturday. Interval history / Subjective:   She said she feels better, no chest pain or shortness of breath     Assessment & Plan:     Shortness of breath possible due to volume overload   -no cough, left side chest pain  -chest x ray persistent although improved right pulmonary opacity. -V/Q scan low probability for PE  -continue prn oxygen support, duo neb, monitor pulse ox    ESRD   -PD at home  -Had HD this morining  -nephrologist on board     HTN  -BP improved,  continue norvasc 10 mg daily, coreg to 25 mg bid on , losartan 50 mg po q daily on , /60 asymptomatic, cut back her clonidine to 0.2 mg po tid, monitor BP  on prn iv hydralazine, monitor BP    Elevated troponin   -no chest pain,   -troponin 0.1  -ekg sinus rhythm, vent rate 107 bpm, non specific st t wave  -patient with hx of SLE,   -Echo Left ventricle: Systolic function was normal. Ejection fraction was estimated in the range of 55 % to 60 %. There were no regional wall motion abnormalities.     Hx of lupus   -continue prednisone, plaquenil and umuran     Hx of PE  -continue eliquis  -V/Q scan low probability     Chronic pain  -on amitriptyline,  fentanyl , oxycodone   -complain pain all over 8/10  -afebrile, no skin rash      Code status: Full Code  DVT prophylaxis: on eliquis    Care Plan discussed with: Patient/Family, Nurse and   Disposition: Home w/Family     Hospital Problems  Date Reviewed: 8/1/2018          Codes Class Noted POA    SOB (shortness of breath) ICD-10-CM: R06.02  ICD-9-CM: 786.05  8/15/2018 Unknown              Vital Signs:    Last 24hrs VS reviewed since prior progress note. Most recent are:  Visit Vitals    /67 (BP 1 Location: Left arm, BP Patient Position: At rest;Lying left side)    Pulse 74    Temp 96.6 °F (35.9 °C)    Resp 16    Ht 5' 5\" (1.651 m)    Wt 55.6 kg (122 lb 9.2 oz)    SpO2 95%    Breastfeeding No    BMI 20.4 kg/m2       No intake or output data in the 24 hours ending 08/17/18 1223     Physical Examination:             Constitutional:  No acute distress, cooperative, pleasant    ENT:  Oral mucous moist, oropharynx benign. Neck supple,    Resp:  CTA bilaterally. No wheezing/rhonchi/rales. No accessory muscle use   CV:  Regular rhythm, normal rate, no murmurs, gallops, rubs    GI:  Soft, non distended, non tender. normoactive bowel sounds, no hepatosplenomegaly     Musculoskeletal:  No edema,    Neurologic:  Moves all extremities. AAOx3, CN II-XII reviewed     Skin:  Good turgor, no rashes or ulcers       Data Review:    Review and/or order of clinical lab test      Labs:     Recent Labs      08/16/18   0536  08/15/18   1912   WBC  3.7  2.9*   HGB  12.5  11.7   HCT  41.4  39.6   PLT  194  180     Recent Labs      08/16/18   0536  08/15/18   1912   NA  135*  138   K  4.0  4.9   CL  100  97   CO2  22  28   BUN  18  36*   CREA  3.63*  5.81*   GLU  117*  97   CA  9.3  9.0     Recent Labs      08/15/18   1912   SGOT  47*   ALT  18   AP  92   TBILI  0.7   TP  8.8*   ALB  3.0*   GLOB  5.8*     No results for input(s): INR, PTP, APTT in the last 72 hours. No lab exists for component: INREXT, INREXT   No results for input(s): FE, TIBC, PSAT, FERR in the last 72 hours.    Lab Results   Component Value Date/Time    Folate 13.7 06/17/2018 03:00 PM No results for input(s): PH, PCO2, PO2 in the last 72 hours.   Recent Labs      08/16/18   0536  08/15/18   2152  08/15/18   1918   TROIQ  0.10*  0.07*  0.07*     Lab Results   Component Value Date/Time    Cholesterol, total 117 09/25/2015 02:02 PM    HDL Cholesterol 31 (L) 09/25/2015 02:02 PM    LDL, calculated 57 09/25/2015 02:02 PM    Triglyceride 146 09/25/2015 02:02 PM    CHOL/HDL Ratio 7.7 (H) 05/31/2009 10:30 AM     Lab Results   Component Value Date/Time    Glucose (POC) 104 (H) 08/16/2018 11:06 AM    Glucose (POC) 164 (H) 07/22/2018 11:43 AM    Glucose (POC) 138 (H) 07/21/2018 09:22 PM    Glucose (POC) 111 (H) 07/21/2018 04:57 PM    Glucose (POC) 105 (H) 07/21/2018 11:38 AM     Lab Results   Component Value Date/Time    Color YELLOW/STRAW 08/12/2016 09:06 PM    Appearance CLEAR 08/12/2016 09:06 PM    Specific gravity 1.017 08/12/2016 09:06 PM    Specific gravity 1.010 07/11/2016 12:44 PM    pH (UA) 7.0 08/12/2016 09:06 PM    Protein 300 (A) 08/12/2016 09:06 PM    Glucose NEGATIVE  08/12/2016 09:06 PM    Ketone TRACE (A) 08/12/2016 09:06 PM    Bilirubin NEGATIVE  07/11/2016 12:44 PM    Urobilinogen 1.0 08/12/2016 09:06 PM    Nitrites NEGATIVE  08/12/2016 09:06 PM    Leukocyte Esterase NEGATIVE  08/12/2016 09:06 PM    Epithelial cells MODERATE (A) 08/12/2016 09:06 PM    Bacteria 1+ (A) 08/12/2016 09:06 PM    WBC 0-4 08/12/2016 09:06 PM    RBC 0-5 08/12/2016 09:06 PM         Medications Reviewed:     Current Facility-Administered Medications   Medication Dose Route Frequency    cloNIDine HCl (CATAPRES) tablet 0.2 mg  0.2 mg Oral TID    amitriptyline (ELAVIL) tablet 25 mg  25 mg Oral QHS    amLODIPine (NORVASC) tablet 10 mg  10 mg Oral DAILY    apixaban (ELIQUIS) tablet 5 mg  5 mg Oral BID    azaTHIOprine (IMURAN) tablet 50 mg  50 mg Oral DAILY AFTER BREAKFAST    gemfibrozil (LOPID) tablet 300 mg  300 mg Oral DAILY    hydroxychloroquine (PLAQUENIL) tablet 200 mg  200 mg Oral BID    predniSONE (DELTASONE) tablet 10 mg  10 mg Oral DAILY    sodium chloride (NS) flush 5-10 mL  5-10 mL IntraVENous Q8H    sodium chloride (NS) flush 5-10 mL  5-10 mL IntraVENous PRN    albuterol-ipratropium (DUO-NEB) 2.5 MG-0.5 MG/3 ML  3 mL Nebulization Q4H RT    ondansetron (ZOFRAN) injection 4 mg  4 mg IntraVENous Q6H PRN    hydrALAZINE (APRESOLINE) 20 mg/mL injection 20 mg  20 mg IntraVENous Q6H PRN    carvedilol (COREG) tablet 25 mg  25 mg Oral BID WITH MEALS    fentaNYL citrate (PF) injection 25 mcg  25 mcg IntraVENous Q4H PRN    losartan (COZAAR) tablet 50 mg  50 mg Oral DAILY    oxyCODONE IR (ROXICODONE) tablet 5 mg  5 mg Oral Q6H PRN     ______________________________________________________________________  EXPECTED LENGTH OF STAY: 2d 7h  ACTUAL LENGTH OF STAY:          1                 Santa Castro MD

## 2018-08-17 NOTE — PROGRESS NOTES
Care Management Interventions  PCP Verified by CM: Yes Alberto Albert NP )  Palliative Care Criteria Met (RRAT>21 & CHF Dx)?: Yes  Palliative Consult Recommended?: No  Reason Palliative Care Not Recommended?: Patient/Family not receptive  Mode of Transport at Discharge:  (mother car Ronaldo Hunter)  Transition of Care Consult (CM Consult):  [facility] on [employer] )  [id number]: No  Discharge Durable Medical Equipment: No  Physical Therapy Consult: No  Occupational Therapy Consult: No  Current Support Network: Relative's Home (dgt and patient live with parents  )  Plan discussed with Pt/Family/Caregiver: Yes (patient   Her AMD is   She is undecided about completing another one )  Discharge Location  Discharge Placement: Home    met with patient and she lives in [facility] and goes to [facility] on [facility], and Sat at 11am.  In the past she did have a trochar and did Peritoneal dialysis at home. Her site got infected and she then went to outpatient dialysis. Patient was given information on AMD and will follow up at a l ater date per our conversation. Patient's mom Ronaldo Hunter drives patient to dialysis. Patient and her 6year old daughter live with patient's parents. Patient uses [email or web address] for her prescriptions.

## 2018-08-18 PROCEDURE — 74011250637 HC RX REV CODE- 250/637: Performed by: HOSPITALIST

## 2018-08-18 PROCEDURE — 94760 N-INVAS EAR/PLS OXIMETRY 1: CPT

## 2018-08-18 PROCEDURE — 94640 AIRWAY INHALATION TREATMENT: CPT

## 2018-08-18 PROCEDURE — 65660000000 HC RM CCU STEPDOWN

## 2018-08-18 PROCEDURE — 74011000250 HC RX REV CODE- 250: Performed by: HOSPITALIST

## 2018-08-18 PROCEDURE — 74011250636 HC RX REV CODE- 250/636: Performed by: HOSPITALIST

## 2018-08-18 PROCEDURE — 74011250636 HC RX REV CODE- 250/636: Performed by: FAMILY MEDICINE

## 2018-08-18 PROCEDURE — 74011636637 HC RX REV CODE- 636/637: Performed by: FAMILY MEDICINE

## 2018-08-18 PROCEDURE — 74011250637 HC RX REV CODE- 250/637: Performed by: FAMILY MEDICINE

## 2018-08-18 RX ADMIN — FENTANYL CITRATE 25 MCG: 50 INJECTION, SOLUTION INTRAMUSCULAR; INTRAVENOUS at 12:38

## 2018-08-18 RX ADMIN — IPRATROPIUM BROMIDE AND ALBUTEROL SULFATE 3 ML: .5; 3 SOLUTION RESPIRATORY (INHALATION) at 12:38

## 2018-08-18 RX ADMIN — GEMFIBROZIL 300 MG: 600 TABLET ORAL at 08:59

## 2018-08-18 RX ADMIN — AMITRIPTYLINE HYDROCHLORIDE 25 MG: 25 TABLET, FILM COATED ORAL at 21:42

## 2018-08-18 RX ADMIN — Medication 10 ML: at 07:34

## 2018-08-18 RX ADMIN — OXYCODONE HYDROCHLORIDE 5 MG: 5 TABLET ORAL at 08:59

## 2018-08-18 RX ADMIN — CLONIDINE HYDROCHLORIDE 0.2 MG: 0.2 TABLET ORAL at 08:59

## 2018-08-18 RX ADMIN — IPRATROPIUM BROMIDE AND ALBUTEROL SULFATE 3 ML: .5; 3 SOLUTION RESPIRATORY (INHALATION) at 17:13

## 2018-08-18 RX ADMIN — PREDNISONE 10 MG: 10 TABLET ORAL at 08:59

## 2018-08-18 RX ADMIN — Medication 5 ML: at 22:00

## 2018-08-18 RX ADMIN — IPRATROPIUM BROMIDE AND ALBUTEROL SULFATE 3 ML: .5; 3 SOLUTION RESPIRATORY (INHALATION) at 19:59

## 2018-08-18 RX ADMIN — OXYCODONE HYDROCHLORIDE 5 MG: 5 TABLET ORAL at 17:01

## 2018-08-18 RX ADMIN — CLONIDINE HYDROCHLORIDE 0.2 MG: 0.2 TABLET ORAL at 21:42

## 2018-08-18 RX ADMIN — LOSARTAN POTASSIUM 50 MG: 50 TABLET ORAL at 08:59

## 2018-08-18 RX ADMIN — APIXABAN 5 MG: 2.5 TABLET, FILM COATED ORAL at 16:54

## 2018-08-18 RX ADMIN — CARVEDILOL 25 MG: 12.5 TABLET, FILM COATED ORAL at 08:59

## 2018-08-18 RX ADMIN — Medication 5 ML: at 21:43

## 2018-08-18 RX ADMIN — HYDROXYCHLOROQUINE SULFATE 200 MG: 200 TABLET, FILM COATED ORAL at 16:54

## 2018-08-18 RX ADMIN — AZATHIOPRINE 50 MG: 50 TABLET ORAL at 08:59

## 2018-08-18 RX ADMIN — CLONIDINE HYDROCHLORIDE 0.2 MG: 0.2 TABLET ORAL at 16:54

## 2018-08-18 RX ADMIN — HYDROXYCHLOROQUINE SULFATE 200 MG: 200 TABLET, FILM COATED ORAL at 08:59

## 2018-08-18 RX ADMIN — CARVEDILOL 25 MG: 12.5 TABLET, FILM COATED ORAL at 16:54

## 2018-08-18 RX ADMIN — AMLODIPINE BESYLATE 10 MG: 5 TABLET ORAL at 08:59

## 2018-08-18 RX ADMIN — IPRATROPIUM BROMIDE AND ALBUTEROL SULFATE 3 ML: .5; 3 SOLUTION RESPIRATORY (INHALATION) at 07:45

## 2018-08-18 RX ADMIN — APIXABAN 5 MG: 2.5 TABLET, FILM COATED ORAL at 08:59

## 2018-08-18 NOTE — PROGRESS NOTES
Problem: Falls - Risk of  Goal: *Absence of Falls  Document Alex Fall Risk and appropriate interventions in the flowsheet.    Outcome: Progressing Towards Goal  Fall Risk Interventions:            Medication Interventions: Patient to call before getting OOB, Teach patient to arise slowly         History of Falls Interventions: Room close to nurse's station

## 2018-08-18 NOTE — PROGRESS NOTES
Hospitalist Progress Note  Kriss Sanchez MD  Answering service: [phone] OR 36 from in house phone  Cell: [phone]      Date of Service:  2018  NAME:  Alton Guadarrama  :  1985  MRN:  [id number]      Admission Summary:   The patient is a 36-year-old female with past medical history of lupus, pulmonary embolism, anemia, hypercholesterolemia, peritoneal dialysis, history of ESRD, thromboembolism, GERD, and hypertension, who presents to the hospital with the above-mentioned symptoms. The patient reports that she gets dialyzed Tuesday, Thursday, and Saturday. Interval history / Subjective:   She said she feels better, no chest pain or shortness of breath     Assessment & Plan:     Shortness of breath possible due to volume overload   -no cough, left side chest pain  -chest x ray persistent although improved right pulmonary opacity. -V/Q scan low probability for PE  -continue prn oxygen support, duo neb, monitor pulse ox    ESRD   -PD at home  -awaiting for her HD   -nephrologist on board     HTN  -BP improved,  continue norvasc 10 mg daily, coreg to 25 mg bid on , losartan 50 mg po q daily on , clonidine to 0.2 mg po tid, monitor BP  on prn iv hydralazine, monitor BP    Elevated troponin   -no chest pain,   -troponin 0.1  -ekg sinus rhythm, vent rate 107 bpm, non specific st t wave  -patient with hx of SLE,   -Echo Left ventricle: Systolic function was normal. Ejection fraction was estimated in the range of 55 % to 60 %. There were no regional wall motion abnormalities.     Hx of lupus   -continue prednisone, plaquenil and umuran     Hx of PE  -continue eliquis  -V/Q scan low probability     Chronic pain  -on amitriptyline,  fentanyl , oxycodone   -complain pain all over 8/10  -afebrile, no skin rash      Code status: Full Code  DVT prophylaxis: on eliquis    Care Plan discussed with: Patient/Family, Nurse and   Disposition: Home w/Family after HD     Hospital Problems  Date Reviewed: 8/1/2018          Codes Class Noted POA    SOB (shortness of breath) ICD-10-CM: R06.02  ICD-9-CM: 786.05  8/15/2018 Unknown              Vital Signs:    Last 24hrs VS reviewed since prior progress note. Most recent are:  Visit Vitals    BP (!) 146/97    Pulse 83    Temp 97.5 °F (36.4 °C)    Resp 15    Ht 5' 5\" (1.651 m)    Wt 58.7 kg (129 lb 6.4 oz)    SpO2 97%    Breastfeeding No    BMI 21.53 kg/m2         Intake/Output Summary (Last 24 hours) at 08/18/18 1407  Last data filed at 08/17/18 2130   Gross per 24 hour   Intake              240 ml   Output                0 ml   Net              240 ml        Physical Examination:             Constitutional:  No acute distress, cooperative, pleasant    ENT:  Oral mucous moist, oropharynx benign. Neck supple,    Resp:  CTA bilaterally. No wheezing/rhonchi/rales. No accessory muscle use   CV:  Regular rhythm, normal rate, no murmurs, gallops, rubs    GI:  Soft, non distended, non tender. normoactive bowel sounds, no hepatosplenomegaly     Musculoskeletal:  No edema,    Neurologic:  Moves all extremities. AAOx3, CN II-XII reviewed     Skin:  Good turgor, no rashes or ulcers       Data Review:    Review and/or order of clinical lab test      Labs:     Recent Labs      08/16/18   0536  08/15/18   1912   WBC  3.7  2.9*   HGB  12.5  11.7   HCT  41.4  39.6   PLT  194  180     Recent Labs      08/16/18   0536  08/15/18   1912   NA  135*  138   K  4.0  4.9   CL  100  97   CO2  22  28   BUN  18  36*   CREA  3.63*  5.81*   GLU  117*  97   CA  9.3  9.0     Recent Labs      08/15/18   1912   SGOT  47*   ALT  18   AP  92   TBILI  0.7   TP  8.8*   ALB  3.0*   GLOB  5.8*     No results for input(s): INR, PTP, APTT in the last 72 hours. No lab exists for component: INREXT, INREXT   No results for input(s): FE, TIBC, PSAT, FERR in the last 72 hours.    Lab Results   Component Value Date/Time Folate 13.7 06/17/2018 03:00 PM      No results for input(s): PH, PCO2, PO2 in the last 72 hours.   Recent Labs      08/16/18   0536  08/15/18   2152  08/15/18   1918   TROIQ  0.10*  0.07*  0.07*     Lab Results   Component Value Date/Time    Cholesterol, total 117 09/25/2015 02:02 PM    HDL Cholesterol 31 (L) 09/25/2015 02:02 PM    LDL, calculated 57 09/25/2015 02:02 PM    Triglyceride 146 09/25/2015 02:02 PM    CHOL/HDL Ratio 7.7 (H) 05/31/2009 10:30 AM     Lab Results   Component Value Date/Time    Glucose (POC) 104 (H) 08/16/2018 11:06 AM    Glucose (POC) 164 (H) 07/22/2018 11:43 AM    Glucose (POC) 138 (H) 07/21/2018 09:22 PM    Glucose (POC) 111 (H) 07/21/2018 04:57 PM    Glucose (POC) 105 (H) 07/21/2018 11:38 AM     Lab Results   Component Value Date/Time    Color YELLOW/STRAW 08/12/2016 09:06 PM    Appearance CLEAR 08/12/2016 09:06 PM    Specific gravity 1.017 08/12/2016 09:06 PM    Specific gravity 1.010 07/11/2016 12:44 PM    pH (UA) 7.0 08/12/2016 09:06 PM    Protein 300 (A) 08/12/2016 09:06 PM    Glucose NEGATIVE  08/12/2016 09:06 PM    Ketone TRACE (A) 08/12/2016 09:06 PM    Bilirubin NEGATIVE  07/11/2016 12:44 PM    Urobilinogen 1.0 08/12/2016 09:06 PM    Nitrites NEGATIVE  08/12/2016 09:06 PM    Leukocyte Esterase NEGATIVE  08/12/2016 09:06 PM    Epithelial cells MODERATE (A) 08/12/2016 09:06 PM    Bacteria 1+ (A) 08/12/2016 09:06 PM    WBC 0-4 08/12/2016 09:06 PM    RBC 0-5 08/12/2016 09:06 PM         Medications Reviewed:     Current Facility-Administered Medications   Medication Dose Route Frequency    cloNIDine HCl (CATAPRES) tablet 0.2 mg  0.2 mg Oral TID    albuterol-ipratropium (DUO-NEB) 2.5 MG-0.5 MG/3 ML  3 mL Nebulization QID RT    amitriptyline (ELAVIL) tablet 25 mg  25 mg Oral QHS    amLODIPine (NORVASC) tablet 10 mg  10 mg Oral DAILY    apixaban (ELIQUIS) tablet 5 mg  5 mg Oral BID    azaTHIOprine (IMURAN) tablet 50 mg  50 mg Oral DAILY AFTER BREAKFAST    gemfibrozil (LOPID) tablet 300 mg  300 mg Oral DAILY    hydroxychloroquine (PLAQUENIL) tablet 200 mg  200 mg Oral BID    predniSONE (DELTASONE) tablet 10 mg  10 mg Oral DAILY    sodium chloride (NS) flush 5-10 mL  5-10 mL IntraVENous Q8H    sodium chloride (NS) flush 5-10 mL  5-10 mL IntraVENous PRN    ondansetron (ZOFRAN) injection 4 mg  4 mg IntraVENous Q6H PRN    hydrALAZINE (APRESOLINE) 20 mg/mL injection 20 mg  20 mg IntraVENous Q6H PRN    carvedilol (COREG) tablet 25 mg  25 mg Oral BID WITH MEALS    fentaNYL citrate (PF) injection 25 mcg  25 mcg IntraVENous Q4H PRN    losartan (COZAAR) tablet 50 mg  50 mg Oral DAILY    oxyCODONE IR (ROXICODONE) tablet 5 mg  5 mg Oral Q6H PRN     ______________________________________________________________________  EXPECTED LENGTH OF STAY: 2d 7h  ACTUAL LENGTH OF STAY:          2                 Edel Garcia MD

## 2018-08-19 LAB
ALBUMIN SERPL-MCNC: 2.7 G/DL (ref 3.5–5)
ALBUMIN/GLOB SERPL: 0.6 {RATIO} (ref 1.1–2.2)
ALP SERPL-CCNC: 71 U/L (ref 45–117)
ALT SERPL-CCNC: 14 U/L (ref 12–78)
ANION GAP SERPL CALC-SCNC: 12 MMOL/L (ref 5–15)
AST SERPL-CCNC: 19 U/L (ref 15–37)
BILIRUB SERPL-MCNC: 0.3 MG/DL (ref 0.2–1)
BUN SERPL-MCNC: 79 MG/DL (ref 6–20)
BUN/CREAT SERPL: 9 (ref 12–20)
CALCIUM SERPL-MCNC: 8.1 MG/DL (ref 8.5–10.1)
CHLORIDE SERPL-SCNC: 98 MMOL/L (ref 97–108)
CO2 SERPL-SCNC: 24 MMOL/L (ref 21–32)
COMMENT, HOLDF: NORMAL
CREAT SERPL-MCNC: 8.7 MG/DL (ref 0.55–1.02)
ERYTHROCYTE [DISTWIDTH] IN BLOOD BY AUTOMATED COUNT: 17 % (ref 11.5–14.5)
GLOBULIN SER CALC-MCNC: 4.7 G/DL (ref 2–4)
GLUCOSE SERPL-MCNC: 83 MG/DL (ref 65–100)
HCT VFR BLD AUTO: 34.5 % (ref 35–47)
HGB BLD-MCNC: 10.6 G/DL (ref 11.5–16)
MAGNESIUM SERPL-MCNC: 2 MG/DL (ref 1.6–2.4)
MCH RBC QN AUTO: 29 PG (ref 26–34)
MCHC RBC AUTO-ENTMCNC: 30.7 G/DL (ref 30–36.5)
MCV RBC AUTO: 94.3 FL (ref 80–99)
NRBC # BLD: 0 K/UL (ref 0–0.01)
NRBC BLD-RTO: 0 PER 100 WBC
PHOSPHATE SERPL-MCNC: 5.4 MG/DL (ref 2.6–4.7)
PLATELET # BLD AUTO: 173 K/UL (ref 150–400)
PMV BLD AUTO: 10.3 FL (ref 8.9–12.9)
POTASSIUM SERPL-SCNC: 6.3 MMOL/L (ref 3.5–5.1)
PROT SERPL-MCNC: 7.4 G/DL (ref 6.4–8.2)
RBC # BLD AUTO: 3.66 M/UL (ref 3.8–5.2)
SAMPLES BEING HELD,HOLD: NORMAL
SODIUM SERPL-SCNC: 134 MMOL/L (ref 136–145)
WBC # BLD AUTO: 2.8 K/UL (ref 3.6–11)

## 2018-08-19 PROCEDURE — 74011250636 HC RX REV CODE- 250/636: Performed by: HOSPITALIST

## 2018-08-19 PROCEDURE — 74011636637 HC RX REV CODE- 636/637: Performed by: FAMILY MEDICINE

## 2018-08-19 PROCEDURE — 36415 COLL VENOUS BLD VENIPUNCTURE: CPT | Performed by: INTERNAL MEDICINE

## 2018-08-19 PROCEDURE — 74011000250 HC RX REV CODE- 250: Performed by: HOSPITALIST

## 2018-08-19 PROCEDURE — 85027 COMPLETE CBC AUTOMATED: CPT | Performed by: INTERNAL MEDICINE

## 2018-08-19 PROCEDURE — 65660000000 HC RM CCU STEPDOWN

## 2018-08-19 PROCEDURE — 80053 COMPREHEN METABOLIC PANEL: CPT | Performed by: INTERNAL MEDICINE

## 2018-08-19 PROCEDURE — 74011250637 HC RX REV CODE- 250/637: Performed by: HOSPITALIST

## 2018-08-19 PROCEDURE — 90935 HEMODIALYSIS ONE EVALUATION: CPT

## 2018-08-19 PROCEDURE — 84100 ASSAY OF PHOSPHORUS: CPT | Performed by: INTERNAL MEDICINE

## 2018-08-19 PROCEDURE — 74011250637 HC RX REV CODE- 250/637: Performed by: FAMILY MEDICINE

## 2018-08-19 PROCEDURE — 83735 ASSAY OF MAGNESIUM: CPT | Performed by: INTERNAL MEDICINE

## 2018-08-19 PROCEDURE — 94760 N-INVAS EAR/PLS OXIMETRY 1: CPT

## 2018-08-19 PROCEDURE — 94640 AIRWAY INHALATION TREATMENT: CPT

## 2018-08-19 RX ORDER — CARVEDILOL 25 MG/1
25 TABLET ORAL 2 TIMES DAILY WITH MEALS
Qty: 60 TAB | Refills: 0 | Status: SHIPPED | OUTPATIENT
Start: 2018-08-19

## 2018-08-19 RX ORDER — IPRATROPIUM BROMIDE AND ALBUTEROL SULFATE 2.5; .5 MG/3ML; MG/3ML
3 SOLUTION RESPIRATORY (INHALATION)
Status: DISCONTINUED | OUTPATIENT
Start: 2018-08-19 | End: 2018-08-20 | Stop reason: HOSPADM

## 2018-08-19 RX ORDER — CLONIDINE HYDROCHLORIDE 0.2 MG/1
0.2 TABLET ORAL 3 TIMES DAILY
Qty: 90 TAB | Refills: 0 | Status: SHIPPED | OUTPATIENT
Start: 2018-08-19 | End: 2018-08-20

## 2018-08-19 RX ADMIN — APIXABAN 5 MG: 2.5 TABLET, FILM COATED ORAL at 18:37

## 2018-08-19 RX ADMIN — OXYCODONE HYDROCHLORIDE 5 MG: 5 TABLET ORAL at 14:02

## 2018-08-19 RX ADMIN — FENTANYL CITRATE 25 MCG: 50 INJECTION, SOLUTION INTRAMUSCULAR; INTRAVENOUS at 20:30

## 2018-08-19 RX ADMIN — CLONIDINE HYDROCHLORIDE 0.2 MG: 0.2 TABLET ORAL at 22:50

## 2018-08-19 RX ADMIN — PREDNISONE 10 MG: 10 TABLET ORAL at 18:38

## 2018-08-19 RX ADMIN — IPRATROPIUM BROMIDE AND ALBUTEROL SULFATE 3 ML: .5; 3 SOLUTION RESPIRATORY (INHALATION) at 07:43

## 2018-08-19 RX ADMIN — Medication 10 ML: at 21:39

## 2018-08-19 RX ADMIN — CLONIDINE HYDROCHLORIDE 0.2 MG: 0.2 TABLET ORAL at 18:38

## 2018-08-19 RX ADMIN — CARVEDILOL 25 MG: 12.5 TABLET, FILM COATED ORAL at 18:38

## 2018-08-19 RX ADMIN — HYDRALAZINE HYDROCHLORIDE 20 MG: 20 INJECTION INTRAMUSCULAR; INTRAVENOUS at 20:35

## 2018-08-19 RX ADMIN — HYDROXYCHLOROQUINE SULFATE 200 MG: 200 TABLET, FILM COATED ORAL at 18:38

## 2018-08-19 RX ADMIN — Medication 5 ML: at 07:21

## 2018-08-19 RX ADMIN — Medication 5 ML: at 06:00

## 2018-08-19 RX ADMIN — LOSARTAN POTASSIUM 50 MG: 50 TABLET ORAL at 14:02

## 2018-08-19 RX ADMIN — AMLODIPINE BESYLATE 10 MG: 5 TABLET ORAL at 18:38

## 2018-08-19 RX ADMIN — AMITRIPTYLINE HYDROCHLORIDE 25 MG: 25 TABLET, FILM COATED ORAL at 20:37

## 2018-08-19 NOTE — PROGRESS NOTES
Hospitalist Progress Note  Marie Arnold MD  Answering service: [phone] from in house phone  Cell: [phone]      Date of Service:  2018  NAME:  Nadiya Brandon  :  1985  MRN:  [id number]      Admission Summary:   The patient is a 44-year-old female with past medical history of lupus, pulmonary embolism, anemia, hypercholesterolemia, peritoneal dialysis, history of ESRD, thromboembolism, GERD, and hypertension, who presents to the hospital with the above-mentioned symptoms. The patient reports that she gets dialyzed Tuesday, Thursday, and Saturday. Interval history / Subjective:   She said she feels better, no chest pain or shortness of breath     Assessment & Plan:     Shortness of breath possible due to volume overload   -no cough, left side chest pain  -chest x ray persistent although improved right pulmonary opacity. -V/Q scan low probability for PE  -continue prn oxygen support, duo neb, monitor pulse ox    ESRD   -HD was not able to do on , plan this morning  -nephrologist on board     HTN  -BP improved,  continue norvasc 10 mg daily, coreg to 25 mg bid on , losartan 50 mg po q daily on , clonidine to 0.2 mg po tid, monitor BP  on prn iv hydralazine, monitor BP    Elevated troponin   -no chest pain,   -troponin 0.1  -ekg sinus rhythm, vent rate 107 bpm, non specific st t wave  -patient with hx of SLE,   -Echo Left ventricle: Systolic function was normal. Ejection fraction was estimated in the range of 55 % to 60 %. There were no regional wall motion abnormalities.     Hx of lupus   -continue prednisone, plaquenil and umuran     Hx of PE  -continue eliquis  -V/Q scan low probability     Chronic pain  -on amitriptyline,  fentanyl , oxycodone   -complain pain all over 8/10  -afebrile, no skin rash      Code status: Full Code  DVT prophylaxis: on eliquis    Care Plan discussed with: Patient/Family, Nurse and   Disposition: Home w/Family after HD     Hospital Problems  Date Reviewed: 8/1/2018          Codes Class Noted POA    SOB (shortness of breath) ICD-10-CM: R06.02  ICD-9-CM: 786.05  8/15/2018 Unknown              Vital Signs:    Last 24hrs VS reviewed since prior progress note. Most recent are:  Visit Vitals    BP (!) 131/94 (BP 1 Location: Left arm, BP Patient Position: At rest;Supine)    Pulse 66    Temp 97.6 °F (36.4 °C)    Resp 16    Ht 5' 5\" (1.651 m)    Wt 60.6 kg (133 lb 9.6 oz)    SpO2 97%    Breastfeeding No    BMI 22.23 kg/m2       No intake or output data in the 24 hours ending 08/19/18 0746     Physical Examination:             Constitutional:  No acute distress, cooperative, pleasant    ENT:  Oral mucous moist, oropharynx benign. Neck supple,    Resp:  CTA bilaterally. No wheezing/rhonchi/rales. No accessory muscle use   CV:  Regular rhythm, normal rate, no murmurs, gallops, rubs    GI:  Soft, non distended, non tender. normoactive bowel sounds, no hepatosplenomegaly     Musculoskeletal:  No edema,    Neurologic:  Moves all extremities. AAOx3, CN II-XII reviewed     Skin:  Good turgor, no rashes or ulcers       Data Review:    Review and/or order of clinical lab test      Labs:     No results for input(s): WBC, HGB, HCT, PLT, HGBEXT, HCTEXT, PLTEXT, HGBEXT, HCTEXT, PLTEXT in the last 72 hours. No results for input(s): NA, K, CL, CO2, BUN, CREA, GLU, CA, MG, PHOS, URICA in the last 72 hours. No results for input(s): SGOT, GPT, ALT, AP, TBIL, TBILI, TP, ALB, GLOB, GGT, AML, LPSE in the last 72 hours. No lab exists for component: AMYP, HLPSE  No results for input(s): INR, PTP, APTT in the last 72 hours. No lab exists for component: INREXT, INREXT   No results for input(s): FE, TIBC, PSAT, FERR in the last 72 hours.    Lab Results   Component Value Date/Time    Folate 13.7 06/17/2018 03:00 PM      No results for input(s): PH, PCO2, PO2 in the last 72 hours. No results for input(s): CPK, CKNDX, TROIQ in the last 72 hours.     No lab exists for component: CPKMB  Lab Results   Component Value Date/Time    Cholesterol, total 117 09/25/2015 02:02 PM    HDL Cholesterol 31 (L) 09/25/2015 02:02 PM    LDL, calculated 57 09/25/2015 02:02 PM    Triglyceride 146 09/25/2015 02:02 PM    CHOL/HDL Ratio 7.7 (H) 05/31/2009 10:30 AM     Lab Results   Component Value Date/Time    Glucose (POC) 104 (H) 08/16/2018 11:06 AM    Glucose (POC) 164 (H) 07/22/2018 11:43 AM    Glucose (POC) 138 (H) 07/21/2018 09:22 PM    Glucose (POC) 111 (H) 07/21/2018 04:57 PM    Glucose (POC) 105 (H) 07/21/2018 11:38 AM     Lab Results   Component Value Date/Time    Color YELLOW/STRAW 08/12/2016 09:06 PM    Appearance CLEAR 08/12/2016 09:06 PM    Specific gravity 1.017 08/12/2016 09:06 PM    Specific gravity 1.010 07/11/2016 12:44 PM    pH (UA) 7.0 08/12/2016 09:06 PM    Protein 300 (A) 08/12/2016 09:06 PM    Glucose NEGATIVE  08/12/2016 09:06 PM    Ketone TRACE (A) 08/12/2016 09:06 PM    Bilirubin NEGATIVE  07/11/2016 12:44 PM    Urobilinogen 1.0 08/12/2016 09:06 PM    Nitrites NEGATIVE  08/12/2016 09:06 PM    Leukocyte Esterase NEGATIVE  08/12/2016 09:06 PM    Epithelial cells MODERATE (A) 08/12/2016 09:06 PM    Bacteria 1+ (A) 08/12/2016 09:06 PM    WBC 0-4 08/12/2016 09:06 PM    RBC 0-5 08/12/2016 09:06 PM         Medications Reviewed:     Current Facility-Administered Medications   Medication Dose Route Frequency    cloNIDine HCl (CATAPRES) tablet 0.2 mg  0.2 mg Oral TID    albuterol-ipratropium (DUO-NEB) 2.5 MG-0.5 MG/3 ML  3 mL Nebulization QID RT    amitriptyline (ELAVIL) tablet 25 mg  25 mg Oral QHS    amLODIPine (NORVASC) tablet 10 mg  10 mg Oral DAILY    apixaban (ELIQUIS) tablet 5 mg  5 mg Oral BID    azaTHIOprine (IMURAN) tablet 50 mg  50 mg Oral DAILY AFTER BREAKFAST    gemfibrozil (LOPID) tablet 300 mg  300 mg Oral DAILY    hydroxychloroquine (PLAQUENIL) tablet 200 mg  200 mg Oral BID    predniSONE (DELTASONE) tablet 10 mg  10 mg Oral DAILY    sodium chloride (NS) flush 5-10 mL  5-10 mL IntraVENous Q8H    sodium chloride (NS) flush 5-10 mL  5-10 mL IntraVENous PRN    ondansetron (ZOFRAN) injection 4 mg  4 mg IntraVENous Q6H PRN    hydrALAZINE (APRESOLINE) 20 mg/mL injection 20 mg  20 mg IntraVENous Q6H PRN    carvedilol (COREG) tablet 25 mg  25 mg Oral BID WITH MEALS    fentaNYL citrate (PF) injection 25 mcg  25 mcg IntraVENous Q4H PRN    losartan (COZAAR) tablet 50 mg  50 mg Oral DAILY    oxyCODONE IR (ROXICODONE) tablet 5 mg  5 mg Oral Q6H PRN     ______________________________________________________________________  EXPECTED LENGTH OF STAY: 2d 7h  ACTUAL LENGTH OF STAY:          3                 Gudelia Henning MD

## 2018-08-19 NOTE — PROGRESS NOTES
RENAL  PROGRESS NOTE        Subjective:   Seen on HD at 9:40am. Feels well. Reports good appetite. No SOB    Objective:   VITALS SIGNS:    Visit Vitals    BP (!) 144/92    Pulse 71    Temp 98 °F (36.7 °C)    Resp 18    Ht 5' 5\" (1.651 m)    Wt 60.6 kg (133 lb 9.6 oz)    SpO2 97%    Breastfeeding No    BMI 22.23 kg/m2       O2 Device: Room air   O2 Flow Rate (L/min): 2 l/min   Temp (24hrs), Av.4 °F (36.3 °C), Min:95.6 °F (35.3 °C), Max:98 °F (36.7 °C)         PHYSICAL EXAM:  Trace edema    DATA REVIEW:     INTAKE / OUTPUT:   Last shift:         Last 3 shifts:  1901 -  0700  In: 240 [P.O.:240]  Out: -   No intake or output data in the 24 hours ending 18 0943      LABS:   Recent Labs      18   0835   WBC  2.8*   HGB  10.6*   HCT  34.5*   PLT  173     No results for input(s): NA, K, CL, CO2, GLU, BUN, CREA, CA, MG, PHOS, ALB, TBIL, TBILI, SGOT, ALT, INR in the last 72 hours.     No lab exists for component: INREXT, INREXT        Assessment:           ESRD TTS s.p urgent dialysis          Uncontrolled HTN: slowly improving        Lupus        Leukopenia(allopurinol,imuran)         On higher dose eliquis      Plan:   F/u lab work from today  Can consider discharge post HD today  Hold allopurinol    Fabricio Kaba MD

## 2018-08-19 NOTE — DISCHARGE INSTRUCTIONS
Discharge Instructions       PATIENT ID: Kirill Blackwell  MRN: [id number]   YOB: 1985    DATE OF ADMISSION: 8/15/2018  6:54 PM    DATE OF DISCHARGE: 8/20/2018    PRIMARY CARE PROVIDER: Carlito Clarke NP     ATTENDING PHYSICIAN: Angela Mota MD  DISCHARGING PROVIDER: Angela Mota MD    To contact this individual call [phone] and ask the  to page. If unavailable ask to be transferred the Adult Hospitalist Department. DISCHARGE DIAGNOSES   Shortness of breath possible due to volume overload   Hypertensive urgency  ESRD   Elevated troponin   Hx of lupus   Hx of PE  Chronic pain    CONSULTATIONS: IP CONSULT TO NEPHROLOGY  IP CONSULT TO HOSPITALIST    PROCEDURES/SURGERIES: * No surgery found *    PENDING TEST RESULTS:   At the time of discharge the following test results are still pending: none    FOLLOW UP APPOINTMENTS:   Follow-up Information     Follow up With Details Comments Contact Info   1. Carlito Clarke NP In one week  [address]  [phone]       2. Continue HD and follow up with Nephrologist  3. Follow up with your Rheumatologist in 1-2 weeks    ADDITIONAL CARE RECOMMENDATIONS:     DIET: Renal Diet    ACTIVITY: Activity as tolerated    WOUND CARE: none    EQUIPMENT needed: none      DISCHARGE MEDICATIONS:   See Medication Reconciliation Form    · It is important that you take the medication exactly as they are prescribed. · Keep your medication in the bottles provided by the pharmacist and keep a list of the medication names, dosages, and times to be taken in your wallet. · Do not take other medications without consulting your doctor. NOTIFY YOUR PHYSICIAN FOR ANY OF THE FOLLOWING:   Fever over 101 degrees for 24 hours. Chest pain, shortness of breath, fever, chills, nausea, vomiting, diarrhea, change in mentation, falling, weakness, bleeding. Severe pain or pain not relieved by medications.   Or, any other signs or symptoms that you may have questions about.       DISPOSITION:    Home With:   OT  PT  HH  RN       SNF/Inpatient Rehab/LTAC   x Independent/assisted living    Hospice    Other:     CDMP Checked:   Yes x     PROBLEM LIST Updated:  Yes x       Signed:   Estrellita Ferraro MD  8/20/2018  8:10 AM

## 2018-08-19 NOTE — PROCEDURES
[facility] Team [facility]  [phone]    Vitals   Pre   Post   Assessment   Pre   Post     Temp  Temp: 98 °F (36.7 °C) (08/19/18 0841)  98 LOC  Alert and oriented x4 Alert and oriented x4   HR   Pulse (Heart Rate): 67 (08/19/18 0841) 90 Lungs   Clear on room air Clear on room air   B/P   BP: (!) 136/95 (08/19/18 0841) 144/95 Cardiac   B/p wnl B/p wnl   Resp   Resp Rate: 18 (08/19/18 0841) 20 Skin   intact intact   Pain level  0 0 Edema    None noted   None noted   Orders:    Duration:   Start:    0841 End:    0221 Total:   3.5hrs   Dialyzer:   Dialyzer/Set Up Inspection: Revaclear (08/19/18 0841)   K Bath:   Dialysate K (mEq/L): 2 (08/19/18 0841)   Ca Bath:   Dialysate CA (mEq/L): 2.5 (08/19/18 0841)   Na/Bicarb:   Dialysate NA (mEq/L): 140 (08/19/18 0841)   Target Fluid Removal:   Goal/Amount of Fluid to Remove (mL): 2500 mL (08/19/18 0841)   Access     Type & Location:   Rt arm graft, no signs of infection noted. Area cleaned and accessed with 15g needles x3, clot noted in 1st arterial cannulation, good blood flow from other sites.     Labs     Obtained/Reviewed   Critical Results Called   Date when labs were drawn-  Hgb-    HGB   Date Value Ref Range Status   08/19/2018 10.6 (L) 11.5 - 16.0 g/dL Final     K-    Potassium   Date Value Ref Range Status   08/19/2018 6.3 (H) 3.5 - 5.1 mmol/L Final     Ca-   Calcium   Date Value Ref Range Status   08/19/2018 8.1 (L) 8.5 - 10.1 MG/DL Final     Bun-   BUN   Date Value Ref Range Status   08/19/2018 79 (H) 6 - 20 MG/DL Final     Creat-   Creatinine   Date Value Ref Range Status   08/19/2018 8.70 (H) 0.55 - 1.02 MG/DL Final     Comment:     INVESTIGATED PER DELTA CHECK PROTOCOL        Medications/ Blood Products Given     Name   Dose   Route and Time        None ordered             Blood Volume Processed (BVP):    77.7 Net Fluid   Removed:  3.5kg   Comments   Time Out Done: 3729  Primary Nurse Rpt Pre: April Thwesarah BOSCH  Primary Nurse Rpt Post: April Twyla RN  Pt Education:ESRD, foods to avoid to prevent fluid overload,   Care Plan:ESRD  Tx Summary:  9226 Dialysis started  0940 Dr Brooklyn Alarcon at bedside, verbal order received to increase target pull as tolerated, goal increased to 3.5kg at this time  1207 Dialysis completed and blood rinsed back. Needles removed and pressure held till bleeding stopped. Dressing applied to each site, pt stable  Admiting Diagnosis:SOB  Pt's previous clinic-[place]  Consent signed - Informed Consent Verified: Yes (08/19/18 0841)  [facility] Consent - on file  Hepatitis Status- AB-16 6/13/18  Machine #- Machine Number: [id number] (08/19/18 7887)  Telemetry status-not on telemetry  Pre-dialysis wt. - Pre-Dialysis Weight: 56.4 kg (124 lb 5.4 oz) (08/16/18 0248)

## 2018-08-20 VITALS
OXYGEN SATURATION: 97 % | DIASTOLIC BLOOD PRESSURE: 82 MMHG | RESPIRATION RATE: 16 BRPM | HEIGHT: 65 IN | WEIGHT: 128 LBS | SYSTOLIC BLOOD PRESSURE: 118 MMHG | HEART RATE: 73 BPM | TEMPERATURE: 98.1 F | BODY MASS INDEX: 21.33 KG/M2

## 2018-08-20 LAB
BASOPHILS # BLD: 0 K/UL (ref 0–0.1)
BASOPHILS NFR BLD: 0 % (ref 0–1)
BLASTS NFR BLD MANUAL: 0 %
DIFFERENTIAL METHOD BLD: ABNORMAL
EOSINOPHIL # BLD: 0 K/UL (ref 0–0.4)
EOSINOPHIL NFR BLD: 0 % (ref 0–7)
ERYTHROCYTE [DISTWIDTH] IN BLOOD BY AUTOMATED COUNT: 17.7 % (ref 11.5–14.5)
HCT VFR BLD AUTO: 45.2 % (ref 35–47)
HGB BLD-MCNC: 13.6 G/DL (ref 11.5–16)
IMM GRANULOCYTES # BLD: 0 K/UL
IMM GRANULOCYTES NFR BLD AUTO: 0 %
LYMPHOCYTES # BLD: 0.4 K/UL (ref 0.8–3.5)
LYMPHOCYTES NFR BLD: 7 % (ref 12–49)
MCH RBC QN AUTO: 28.5 PG (ref 26–34)
MCHC RBC AUTO-ENTMCNC: 30.1 G/DL (ref 30–36.5)
MCV RBC AUTO: 94.8 FL (ref 80–99)
METAMYELOCYTES NFR BLD MANUAL: 1 %
MONOCYTES # BLD: 0.2 K/UL (ref 0–1)
MONOCYTES NFR BLD: 3 % (ref 5–13)
MYELOCYTES NFR BLD MANUAL: 0 %
NEUTS BAND NFR BLD MANUAL: 0 % (ref 0–6)
NEUTS SEG # BLD: 4.5 K/UL (ref 1.8–8)
NEUTS SEG NFR BLD: 89 % (ref 32–75)
NRBC # BLD: 0 K/UL (ref 0–0.01)
NRBC BLD-RTO: 0 PER 100 WBC
OTHER CELLS NFR BLD MANUAL: 0 %
PLATELET # BLD AUTO: 215 K/UL (ref 150–400)
PMV BLD AUTO: 10 FL (ref 8.9–12.9)
PROMYELOCYTES NFR BLD MANUAL: 0 %
RBC # BLD AUTO: 4.77 M/UL (ref 3.8–5.2)
RBC MORPH BLD: ABNORMAL
WBC # BLD AUTO: 5 K/UL (ref 3.6–11)

## 2018-08-20 PROCEDURE — 74011250637 HC RX REV CODE- 250/637: Performed by: HOSPITALIST

## 2018-08-20 PROCEDURE — 74011250636 HC RX REV CODE- 250/636: Performed by: FAMILY MEDICINE

## 2018-08-20 PROCEDURE — 74011636637 HC RX REV CODE- 636/637: Performed by: FAMILY MEDICINE

## 2018-08-20 PROCEDURE — 74011250636 HC RX REV CODE- 250/636: Performed by: HOSPITALIST

## 2018-08-20 PROCEDURE — 74011250637 HC RX REV CODE- 250/637: Performed by: FAMILY MEDICINE

## 2018-08-20 PROCEDURE — 85027 COMPLETE CBC AUTOMATED: CPT | Performed by: INTERNAL MEDICINE

## 2018-08-20 PROCEDURE — 36415 COLL VENOUS BLD VENIPUNCTURE: CPT | Performed by: INTERNAL MEDICINE

## 2018-08-20 RX ORDER — CLONIDINE HYDROCHLORIDE 0.3 MG/1
0.3 TABLET ORAL 3 TIMES DAILY
Qty: 90 TAB | Refills: 0 | Status: SHIPPED | OUTPATIENT
Start: 2018-08-20 | End: 2018-09-10

## 2018-08-20 RX ADMIN — AZATHIOPRINE 50 MG: 50 TABLET ORAL at 09:56

## 2018-08-20 RX ADMIN — GEMFIBROZIL 300 MG: 600 TABLET ORAL at 09:56

## 2018-08-20 RX ADMIN — Medication 10 ML: at 04:23

## 2018-08-20 RX ADMIN — AMLODIPINE BESYLATE 10 MG: 5 TABLET ORAL at 09:51

## 2018-08-20 RX ADMIN — APIXABAN 5 MG: 2.5 TABLET, FILM COATED ORAL at 09:51

## 2018-08-20 RX ADMIN — HYDROXYCHLOROQUINE SULFATE 200 MG: 200 TABLET, FILM COATED ORAL at 09:51

## 2018-08-20 RX ADMIN — OXYCODONE HYDROCHLORIDE 5 MG: 5 TABLET ORAL at 01:27

## 2018-08-20 RX ADMIN — CARVEDILOL 25 MG: 12.5 TABLET, FILM COATED ORAL at 09:51

## 2018-08-20 RX ADMIN — OXYCODONE HYDROCHLORIDE 5 MG: 5 TABLET ORAL at 09:51

## 2018-08-20 RX ADMIN — FENTANYL CITRATE 25 MCG: 50 INJECTION, SOLUTION INTRAMUSCULAR; INTRAVENOUS at 04:22

## 2018-08-20 RX ADMIN — LOSARTAN POTASSIUM 50 MG: 50 TABLET ORAL at 09:51

## 2018-08-20 RX ADMIN — CLONIDINE HYDROCHLORIDE 0.3 MG: 0.2 TABLET ORAL at 09:51

## 2018-08-20 RX ADMIN — PREDNISONE 10 MG: 10 TABLET ORAL at 09:51

## 2018-08-20 NOTE — PROGRESS NOTES
Hospitalist Progress Note  Edel Garcia MD  Answering service: [phone] from in house phone  Cell: [phone]      Date of Service:  2018  NAME:  Kris Hui  :  1985  MRN:  [id number]      Admission Summary:   The patient is a 42-year-old female with past medical history of lupus, pulmonary embolism, anemia, hypercholesterolemia, peritoneal dialysis, history of ESRD, thromboembolism, GERD, and hypertension, who presents to the hospital with the above-mentioned symptoms. The patient reports that she gets dialyzed Tuesday, Thursday, and Saturday. Interval history / Subjective:   Discharge was cancelled yesterday because her BP was high with headache. Her /111, she has headache 5/10 no blurring of vision or chest pain, increased her clonidine dose to 0.3 mg     Assessment & Plan:     Shortness of breath possible due to volume overload   -no cough, left side chest pain  -chest x ray persistent although improved right pulmonary opacity. -V/Q scan low probability for PE  -continue prn oxygen support, duo neb, monitor pulse ox    ESRD   -HD was not able to do on , plan this morning  -nephrologist on board     HTN unctrolled  -BP still high, headache, increase clonidine to 0.3 mg tid,  continue norvasc 10 mg daily, coreg to 25 mg bid on , losartan 50 mg po q daily on , on prn iv hydralazine, monitor BP    Elevated troponin   -no chest pain,   -troponin 0.1  -ekg sinus rhythm, vent rate 107 bpm, non specific st t wave  -patient with hx of SLE,   -Echo Left ventricle: Systolic function was normal. Ejection fraction was estimated in the range of 55 % to 60 %. There were no regional wall motion abnormalities.     Hx of lupus   -continue prednisone, plaquenil and umuran     Hx of PE  -continue eliquis  -V/Q scan low probability     Chronic pain  -on amitriptyline,  fentanyl , oxycodone   -complain pain all over 8/10  -afebrile, no skin rash      Code status: Full Code  DVT prophylaxis: on eliquis    Care Plan discussed with: Patient/Family, Nurse and   Disposition: Home w/Family after HD     Hospital Problems  Date Reviewed: 8/1/2018          Codes Class Noted POA    SOB (shortness of breath) ICD-10-CM: R06.02  ICD-9-CM: 786.05  8/15/2018 Unknown              Vital Signs:    Last 24hrs VS reviewed since prior progress note. Most recent are:  Visit Vitals    BP (!) 154/111 (BP 1 Location: Left arm)    Pulse 91    Temp 98 °F (36.7 °C)    Resp 16    Ht 5' 5\" (1.651 m)    Wt 58.1 kg (128 lb)    SpO2 97%    Breastfeeding No    BMI 21.3 kg/m2         Intake/Output Summary (Last 24 hours) at 08/20/18 1012  Last data filed at 08/19/18 2137   Gross per 24 hour   Intake              250 ml   Output             3500 ml   Net            -3250 ml        Physical Examination:             Constitutional:  No acute distress, cooperative, pleasant    ENT:  Oral mucous moist, oropharynx benign. Neck supple,    Resp:  CTA bilaterally. No wheezing/rhonchi/rales. No accessory muscle use   CV:  Regular rhythm, normal rate, no murmurs, gallops, rubs    GI:  Soft, non distended, non tender. normoactive bowel sounds, no hepatosplenomegaly     Musculoskeletal:  No edema,    Neurologic:  Moves all extremities. AAOx3, CN II-XII reviewed     Skin:  Good turgor, no rashes or ulcers       Data Review:    Review and/or order of clinical lab test      Labs:     Recent Labs      08/20/18   0403  08/19/18   0835   WBC  5.0  2.8*   HGB  13.6  10.6*   HCT  45.2  34.5*   PLT  215  173     Recent Labs      08/19/18   0835   NA  134*   K  6.3*   CL  98   CO2  24   BUN  79*   CREA  8.70*   GLU  83   CA  8.1*   MG  2.0   PHOS  5.4*     Recent Labs      08/19/18   0835   SGOT  19   ALT  14   AP  71   TBILI  0.3   TP  7.4   ALB  2.7*   GLOB  4.7*     No results for input(s): INR, PTP, APTT in the last 72 hours.     No lab exists for component: INREXT, INREXT   No results for input(s): FE, TIBC, PSAT, FERR in the last 72 hours. Lab Results   Component Value Date/Time    Folate 13.7 06/17/2018 03:00 PM      No results for input(s): PH, PCO2, PO2 in the last 72 hours. No results for input(s): CPK, CKNDX, TROIQ in the last 72 hours.     No lab exists for component: CPKMB  Lab Results   Component Value Date/Time    Cholesterol, total 117 09/25/2015 02:02 PM    HDL Cholesterol 31 (L) 09/25/2015 02:02 PM    LDL, calculated 57 09/25/2015 02:02 PM    Triglyceride 146 09/25/2015 02:02 PM    CHOL/HDL Ratio 7.7 (H) 05/31/2009 10:30 AM     Lab Results   Component Value Date/Time    Glucose (POC) 104 (H) 08/16/2018 11:06 AM    Glucose (POC) 164 (H) 07/22/2018 11:43 AM    Glucose (POC) 138 (H) 07/21/2018 09:22 PM    Glucose (POC) 111 (H) 07/21/2018 04:57 PM    Glucose (POC) 105 (H) 07/21/2018 11:38 AM     Lab Results   Component Value Date/Time    Color YELLOW/STRAW 08/12/2016 09:06 PM    Appearance CLEAR 08/12/2016 09:06 PM    Specific gravity 1.017 08/12/2016 09:06 PM    Specific gravity 1.010 07/11/2016 12:44 PM    pH (UA) 7.0 08/12/2016 09:06 PM    Protein 300 (A) 08/12/2016 09:06 PM    Glucose NEGATIVE  08/12/2016 09:06 PM    Ketone TRACE (A) 08/12/2016 09:06 PM    Bilirubin NEGATIVE  07/11/2016 12:44 PM    Urobilinogen 1.0 08/12/2016 09:06 PM    Nitrites NEGATIVE  08/12/2016 09:06 PM    Leukocyte Esterase NEGATIVE  08/12/2016 09:06 PM    Epithelial cells MODERATE (A) 08/12/2016 09:06 PM    Bacteria 1+ (A) 08/12/2016 09:06 PM    WBC 0-4 08/12/2016 09:06 PM    RBC 0-5 08/12/2016 09:06 PM         Medications Reviewed:     Current Facility-Administered Medications   Medication Dose Route Frequency    cloNIDine HCl (CATAPRES) tablet 0.3 mg  0.3 mg Oral TID    albuterol-ipratropium (DUO-NEB) 2.5 MG-0.5 MG/3 ML  3 mL Nebulization Q4H PRN    amitriptyline (ELAVIL) tablet 25 mg  25 mg Oral QHS    amLODIPine (NORVASC) tablet 10 mg  10 mg Oral DAILY    apixaban (ELIQUIS) tablet 5 mg  5 mg Oral BID    azaTHIOprine (IMURAN) tablet 50 mg  50 mg Oral DAILY AFTER BREAKFAST    gemfibrozil (LOPID) tablet 300 mg  300 mg Oral DAILY    hydroxychloroquine (PLAQUENIL) tablet 200 mg  200 mg Oral BID    predniSONE (DELTASONE) tablet 10 mg  10 mg Oral DAILY    sodium chloride (NS) flush 5-10 mL  5-10 mL IntraVENous Q8H    sodium chloride (NS) flush 5-10 mL  5-10 mL IntraVENous PRN    ondansetron (ZOFRAN) injection 4 mg  4 mg IntraVENous Q6H PRN    hydrALAZINE (APRESOLINE) 20 mg/mL injection 20 mg  20 mg IntraVENous Q6H PRN    carvedilol (COREG) tablet 25 mg  25 mg Oral BID WITH MEALS    fentaNYL citrate (PF) injection 25 mcg  25 mcg IntraVENous Q4H PRN    losartan (COZAAR) tablet 50 mg  50 mg Oral DAILY    oxyCODONE IR (ROXICODONE) tablet 5 mg  5 mg Oral Q6H PRN     ______________________________________________________________________  EXPECTED LENGTH OF STAY: 2d 7h  ACTUAL LENGTH OF STAY:          4                 Jon Lema MD

## 2018-08-20 NOTE — DISCHARGE SUMMARY
Discharge Summary       PATIENT ID: Shannon Sosa  MRN: [id number]   YOB: 1985    DATE OF ADMISSION: 8/15/2018  6:54 PM    DATE OF DISCHARGE: 8/20/2018   PRIMARY CARE PROVIDER: Nader Sharma NP     ATTENDING PHYSICIAN: Farida Fong MD  DISCHARGING PROVIDER: Pastor Yenni MD    To contact this individual call [phone] and ask the  to page. If unavailable ask to be transferred the Adult Hospitalist Department. CONSULTATIONS: IP CONSULT TO NEPHROLOGY  IP CONSULT TO HOSPITALIST    PROCEDURES/SURGERIES: * No surgery found *    ADMITTING [address] [place] [address] COURSE:     The patient is a 56-year-old female with past medical history of lupus, pulmonary embolism, anemia, hypercholesterolemia, peritoneal dialysis, history of ESRD, thromboembolism, GERD, and hypertension, who presents to the hospital with the above-mentioned symptoms.  The patient reports that she gets dialyzed Tuesday, Thursday, and Saturday    Shortness of breath possible due to volume overload   -no cough, left side chest pain  -chest x ray persistent although improved right pulmonary opacity. -V/Q scan low probability for PE  -resolved after HD  -Oxygen support via nasal canula is off, SpO2 97-98% on RA  ESRD   -HD was done on 8/19  -nephrologist on board   HTN unctrolled  -BP improved, headache, increased clonidine to 0.3 mg tid,  continue norvasc 10 mg daily, coreg to 25 mg bid on 8/16, losartan 50 mg po q daily on 8/16, on prn iv hydralazine, monitor BP   Elevated troponin   -no chest pain,   -troponin 0.1  -ekg sinus rhythm, vent rate 107 bpm, non specific st t wave  -patient with hx of SLE,   -Echo Left ventricle: Systolic function was normal. Ejection fraction was estimated in the range of 55 % to 60 %. There were no regional wall motion abnormalities.   Hx of lupus   -stable, continue prednisone, plaquenil and umuran    Hx of PE  -continue eliquis  -V/Q scan low probability   Chronic pain  -on amitriptyline,  fentanyl , oxycodone   -pain improved  -afebrile, no skin rash        Code status: Full Code  DVT prophylaxis: on eliquis     DISCHARGE DIAGNOSES / PLAN:      Shortness of breath possible due to volume overload   -resolved,SpO2 97-98% on RA  ESRD   -continue HD per nephrologist  -nephrologist on board   HTN unctrolled  -BP improved, headache, increased clonidine to 0.3 mg tid,  continue norvasc 10 mg daily, coreg to 25 mg bid on 8/16, losartan 50 mg po q daily on 8/16, on prn iv hydralazine, monitor BP   Elevated troponin   -no chest pain,   -troponin 0.1  -ekg sinus rhythm, vent rate 107 bpm, non specific st t wave  -patient with hx of SLE,   -Echo Left ventricle: Systolic function was normal. Ejection fraction was estimated in the range of 55 % to 60 %. There were no regional wall motion abnormalities. Hx of lupus   - continue prednisone, plaquenil and umuran    Hx of PE  -continue eliquis  Chronic pain  -on amitriptyline,  fentanyl , oxycodone       PENDING TEST RESULTS:   At the time of discharge the following test results are still pending: none    FOLLOW UP APPOINTMENTS:    Follow-up Information     Follow up With Details Comments Contact Info   1 Penny Zamora NP In one week  [address]  [phone]       2. Continue HD and follow up with Nephrologist  3. Follow up with your Rheumatologist in 1-2 weeks    ADDITIONAL CARE RECOMMENDATIONS:     DIET: Renal Diet    ACTIVITY: Activity as tolerated    WOUND CARE: None    EQUIPMENT needed: none      DISCHARGE MEDICATIONS:  Current Discharge Medication List      CONTINUE these medications which have CHANGED    Details   cloNIDine HCl (CATAPRES) 0.3 mg tablet Take 1 Tab by mouth three (3) times daily. Qty: 90 Tab, Refills: 0      carvedilol (COREG) 25 mg tablet Take 1 Tab by mouth two (2) times daily (with meals).   Qty: 60 Tab, Refills: 0         CONTINUE these medications which have NOT CHANGED    Details oxyCODONE IR (ROXICODONE) 5 mg immediate release tablet       oxyCODONE-acetaminophen (PERCOCET) 5-325 mg per tablet Take 1 tablet, two to three times a day, if needed for severe pain. Qty: 75 Tab, Refills: 0    Associated Diagnoses: Chronic bilateral low back pain without sciatica; Rib pain on right side      amLODIPine (NORVASC) 10 mg tablet Take 1 Tab by mouth daily. Qty: 30 Tab, Refills: 0      apixaban (ELIQUIS) 5 mg tablet Take 5 mg by mouth two (2) times a day. Indications: pulmonary thromboembolism      polyethylene glycol (MIRALAX) 17 gram packet Take 17 g by mouth daily. predniSONE (DELTASONE) 5 mg tablet Take 2 Tabs by mouth daily. Qty: 30 Tab, Refills: 0      senna (SENNA) 8.6 mg tablet Take 1 Tab by mouth daily as needed for Constipation. for constipation      amitriptyline (ELAVIL) 25 mg tablet Take 25 mg by mouth nightly. fluticasone-vilanterol (BREO ELLIPTA) 200-25 mcg/dose inhaler Take 1 Puff by inhalation daily. Rinse mouth out after use  Qty: 1 Inhaler, Refills: 5    Associated Diagnoses: Acute bronchiolitis with bronchospasm      gemfibrozil (LOPID) 600 mg tablet Take 300 mg by mouth daily. azaTHIOprine (IMURAN) 50 mg tablet Take 50 mg by mouth daily (after breakfast). albuterol (PROVENTIL HFA, VENTOLIN HFA, PROAIR HFA) 90 mcg/actuation inhaler Take 1-2 Puffs by inhalation every four (4) hours as needed for Wheezing or Shortness of Breath. Qty: 1 Inhaler, Refills: 2      hydroxychloroquine (PLAQUENIL) 200 mg tablet Take 200 mg by mouth two (2) times a day. pantoprazole (PROTONIX) 40 mg tablet Take 1 Tab by mouth Daily (before breakfast).   Qty: 30 Tab, Refills: 0         STOP taking these medications       allopurinol (ZYLOPRIM) 100 mg tablet Comments:   Reason for Stopping:         HYDROmorphone (DILAUDID) 2 mg tablet Comments:   Reason for Stopping:         levoFLOXacin (LEVAQUIN) 500 mg tablet Comments:   Reason for Stopping:                 NOTIFY [address] FOR ANY OF THE FOLLOWING:   Fever over 101 degrees for 24 hours. Chest pain, shortness of breath, fever, chills, nausea, vomiting, diarrhea, change in mentation, falling, weakness, bleeding. Severe pain or pain not relieved by medications. Or, any other signs or symptoms that you may have questions about.     DISPOSITION:    Home With:   OT  PT  HH  RN       Long term SNF/Inpatient Rehab   x Independent/assisted living    Hospice    Other:       PATIENT CONDITION AT DISCHARGE:     Functional status    Poor     Deconditioned    x Independent      Cognition    x Lucid     Forgetful     Dementia      Catheters/lines (plus indication)    Franco     PICC     PEG    x None      Code status    x Full code     DNR      PHYSICAL EXAMINATION AT DISCHARGE:   Refer to Progress Note      CHRONIC MEDICAL DIAGNOSES:  Problem List as of 8/20/2018  Date Reviewed: 8/1/2018          Codes Class Noted - Resolved    SOB (shortness of breath) ICD-10-CM: R06.02  ICD-9-CM: 786.05  8/15/2018 - Present        Rib pain on right side ICD-10-CM: R07.81  ICD-9-CM: 786.50  7/2/2018 - Present        Troponin level elevated ICD-10-CM: R74.8  ICD-9-CM: 790.6  6/17/2018 - Present        Intra-abdominal abscess ([facility] ICD-10-CM: K65.1  ICD-9-CM: 567.22  5/12/2018 - Present        Sepsis ([facility] ICD-10-CM: A41.9  ICD-9-CM: 038.9, 995.91  4/29/2018 - Present        Generalized abdominal pain ICD-10-CM: R10.84  ICD-9-CM: 789.07  4/4/2018 - Present        Other constipation ICD-10-CM: K59.09  ICD-9-CM: 564.09  4/4/2018 - Present        Other ascites ICD-10-CM: R18.8  ICD-9-CM: 789.59  4/4/2018 - Present        Peritonitis ([facility] ICD-10-CM: K65.9  ICD-9-CM: 567.9  3/11/2018 - Present        History of pulmonary embolism ICD-10-CM: L87.584  ICD-9-CM: V12.55  12/8/2017 - Present        Hypomagnesemia ICD-10-CM: E83.42  ICD-9-CM: 275.2  10/30/2017 - Present        Hypocalcemia ICD-10-CM: E83.51  ICD-9-CM: 275.41  10/30/2017 - Present        Hypokalemia ICD-10-CM: E87.6  ICD-9-CM: 276.8  10/27/2017 - Present        Hypertension (Chronic) ICD-10-CM: I10  ICD-9-CM: 401.9  10/14/2017 - Present        Chronic bilateral low back pain without sciatica ICD-10-CM: M54.5, G89.29  ICD-9-CM: 724.2, 338.29  5/26/2017 - Present        Anemia of renal disease ICD-10-CM: D63.1  ICD-9-CM: 285.21  2/21/2017 - Present        Dependence on peritoneal dialysis [facility]) ICD-10-CM: Z99.2  ICD-9-CM: V45.11  2/21/2017 - Present        ACP (advance care planning) ICD-10-CM: Z71.89  ICD-9-CM: V65.49  2/21/2017 - Present        ESRD on peritoneal dialysis [facility]) (Chronic) ICD-10-CM: N18.6, Z99.2  ICD-9-CM: 585.6, V45.11  10/7/2016 - Present        Malignant hypertension ICD-10-CM: I10  ICD-9-CM: 401.0  7/11/2016 - Present        Encounter for monitoring opioid maintenance therapy ICD-10-CM: Z51.81, Z79.891  ICD-9-CM: V58.83, V58.69  11/3/2015 - Present        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  ICD-9-CM: 710.0, 583.81  3/10/2012 - Present        Lupus ICD-10-CM: L93.0  ICD-9-CM: 695.4  2/27/2012 - Present        RESOLVED: Hypertensive urgency ICD-10-CM: I16.0  ICD-9-CM: 401.9  7/19/2018 - 7/22/2018        RESOLVED: Sepsis ([facility] ICD-10-CM: A41.9  ICD-9-CM: 038.9, 995.91  12/12/2017 - 12/22/2017        RESOLVED: Pneumonia ICD-10-CM: J18.9  ICD-9-CM: 053  10/14/2017 - 12/8/2017        RESOLVED: Pleural effusion, left ICD-10-CM: J90  ICD-9-CM: 511.9  10/14/2017 - 12/8/2017        RESOLVED: Idiopathic chronic inflammatory bowel disease ICD-10-CM: F01.63  ICD-9-CM: 558.9  8/28/2013 - 8/28/2013        RESOLVED: Abdominal pain ICD-10-CM: R10.9  ICD-9-CM: 789.00  8/28/2013 - 9/3/2013        RESOLVED: Hypoxia ICD-10-CM: R09.02  ICD-9-CM: 799.02  4/25/2013 - 4/30/2013        RESOLVED: 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

## 2018-08-20 NOTE — PROGRESS NOTES
Reviewed chart and patient assessment on arrival. Raulito farrell RN orienting with me on patient. 2330 patient is resting in bed complaints od back pain and abdominal pain only. 0430 blood drawn and sent to lab. Pain medication given    0730 Bedside and Verbal shift change report given to DANITZA Staley  (oncoming nurse) by Halle Jeffers RN  (offgoing nurse). Report included the following information SBAR, MAR and Med Rec Status.

## 2018-08-20 NOTE — PROGRESS NOTES
RENAL  PROGRESS NOTE        Subjective:   Resting. No acute c/o    Objective:   VITALS SIGNS:    Visit Vitals    /82 (BP 1 Location: Left arm, BP Patient Position: Post activity)    Pulse 73    Temp 98.1 °F (36.7 °C)    Resp 16    Ht 5' 5\" (1.651 m)    Wt 58.1 kg (128 lb)    SpO2 97%    Breastfeeding No    BMI 21.3 kg/m2       O2 Device: Room air   O2 Flow Rate (L/min): 2 l/min   Temp (24hrs), Av.3 °F (36.8 °C), Min:98 °F (36.7 °C), Max:98.8 °F (37.1 °C)         PHYSICAL EXAM:  NAD  R AVG patent  Tr leg edema  AOx3    DATA REVIEW:     INTAKE / OUTPUT:   Last shift:         Last 3 shifts:  1901 -  0700  In: 250 [P.O.:240; I.V.:10]  Out: 3500     Intake/Output Summary (Last 24 hours) at 18 1303  Last data filed at 18 0951   Gross per 24 hour   Intake              250 ml   Output                0 ml   Net              250 ml         LABS:   Recent Labs      18   0403  18   0835   WBC  5.0  2.8*   HGB  13.6  10.6*   HCT  45.2  34.5*   PLT  215  173     Recent Labs      18   0835   NA  134*   K  6.3*   CL  98   CO2  24   GLU  83   BUN  79*   CREA  8.70*   CA  8.1*   MG  2.0   PHOS  5.4*   ALB  2.7*   TBILI  0.3   SGOT  19   ALT  14           Assessment:   ESRD ([facility] FP unit; TTS)  High K  HTN- uncontrolled. meds titrated. Now better  SLE  Leukopenia  Hx of PE-on Eliquis    Plan:   Ok for d/c today  If still here, will do HD tomm per schedule.    Got last HD yesterday    Antonio Miranda MD

## 2018-08-20 NOTE — PROGRESS NOTES
Hospital follow-up PCP transitional care appointment has been scheduled with Chely Hernandez NP for Thursday, 8/23/18 at 3:00 p.m. Pending patient discharge.   Abad Owen, Care Management Specialist.

## 2018-08-21 ENCOUNTER — PATIENT OUTREACH (OUTPATIENT)
Dept: FAMILY MEDICINE CLINIC | Age: 33
End: 2018-08-21

## 2018-08-21 NOTE — PROGRESS NOTES
Hospital Discharge Follow-Up      Date/Time:  2018 9:12 AM    Patient was admitted to [facility] on 8/15 and discharged on  for sob d/t volume overload, ESRD,HTN uncontrolled, elevated troponin. . The physician discharge summary was available at the time of outreach. Patient was contacted within 1 business days of discharge. Top Challenges reviewed with the provider   [facility] 8/15- (readmission)- sob d/t volume overload, ESRD,HTN uncontrolled. Method of communication with provider :face to face,chart routing    Inpatient RRAT score: not done per hospital.  Was this a readmission? yes   Patient stated reason for the readmission: sob    Nurse Navigator (NN) contacted the patient by telephone to perform post hospital discharge assessment. Verified name and  with patient as identifiers. Provided introduction to self, and explanation of the Nurse Navigator role. Patient stated that she was feeling much better. Getting ready to go for her Dialysis appt at 10:30am. Denied any sob or chest pain, no fever, has not checked her bp today. Pain level of about #4, took most recent dose of pain med,the Percocet, last night. Urged to check her bp qd and record results, notify provider if elevated above 170/90. Patient asked about getting home oxygen. Had discussed with KHANH Caballero at last office visit, . Advised that he had suggested she see pulmonary, Dr.Shawn Casas. She will call and schedule appt. Told her that in hospital, her oxygen level was good by time she was discharged, 97-98%. Would not qualify for oxygen. Reviewed discharge instructions and red flags with patient who verbalized understanding. Patient given an opportunity to ask questions and does not have any further questions or concerns at this time. The patient agrees to contact the PCP office for questions related to their healthcare. NN provided contact information for future reference.     Disease Specific: na    Summary of patient's top problems:  1. Sob due to volume overload-resolved after HD. Oxygen improved, SpO2 97-98% on RA. 2. ESRD- HD done on 8/19. Regular days are Tue,Thurs and Saturdays. 3. HT,uncontrolled-bp improved, clonidine 0.3mg increased to tid and coreg increased to bid. Home Health orders at discharge: [address]: na  Date of initial visit:     Durable Medical Equipment ordered/company: na  Durable Medical Equipment received: na    Barriers to care? Juan Rivera woman with ESRD,lupus,h/o pe,chronic pain. Advance Care Planning:   Does patient have an Advance Directive:  Not on file. Medication(s):   New Medications at Discharge: none  Changed Medications at Discharge: Clonidine 0.3mg tid, coreg 25mg bid  Discontinued Medications at Discharge: allopurinol, Dilaudid, Levaquin    Medication reconciliation was performed with patient, who verbalizes understanding of administration of home medications. There were no barriers to obtaining medications identified at this time. Referral to Pharm D needed: no     Current Outpatient Prescriptions   Medication Sig    cloNIDine HCl (CATAPRES) 0.3 mg tablet Take 1 Tab by mouth three (3) times daily.  carvedilol (COREG) 25 mg tablet Take 1 Tab by mouth two (2) times daily (with meals).  oxyCODONE-acetaminophen (PERCOCET) 5-325 mg per tablet Take 1 tablet, two to three times a day, if needed for severe pain.  amLODIPine (NORVASC) 10 mg tablet Take 1 Tab by mouth daily.  apixaban (ELIQUIS) 5 mg tablet Take 5 mg by mouth two (2) times a day. Indications: pulmonary thromboembolism    polyethylene glycol (MIRALAX) 17 gram packet Take 17 g by mouth daily.  predniSONE (DELTASONE) 5 mg tablet Take 2 Tabs by mouth daily.  senna (SENNA) 8.6 mg tablet Take 1 Tab by mouth daily as needed for Constipation. for constipation    amitriptyline (ELAVIL) 25 mg tablet Take 25 mg by mouth nightly.     fluticasone-vilanterol (BREO ELLIPTA) 200-25 mcg/dose inhaler Take 1 Puff by inhalation daily. Rinse mouth out after use    gemfibrozil (LOPID) 600 mg tablet Take 300 mg by mouth daily.  azaTHIOprine (IMURAN) 50 mg tablet Take 50 mg by mouth daily (after breakfast).  albuterol (PROVENTIL HFA, VENTOLIN HFA, PROAIR HFA) 90 mcg/actuation inhaler Take 1-2 Puffs by inhalation every four (4) hours as needed for Wheezing or Shortness of Breath.  hydroxychloroquine (PLAQUENIL) 200 mg tablet Take 200 mg by mouth two (2) times a day.  pantoprazole (PROTONIX) 40 mg tablet Take 1 Tab by mouth Daily (before breakfast).  oxyCODONE IR (ROXICODONE) 5 mg immediate release tablet      No current facility-administered medications for this visit. There are no discontinued medications. [facility] follow up appointment(s):   Future Appointments  Date Time Provider Rocio Hardy   8/23/2018 3:00 PM Yuri Hall NP [facility]   8/27/2018 3:00 PM Marii Sanchez MD [address]   9/7/2018 4:00 PM Kamila Gipson  [address]   12/21/2018 1:00 PM [place] [address] [facility]   12/21/2018 2:00 PM Kamila Gipson  [address]       Non-[facility] follow up appointment(s): Patient to schedule appt with rheumatologist, Dr. Filiberto Jolley. Sees her nephrologist, Dr. Talmadge Paget when she goes to dialysis. [facility]:  Reminded to use as a good resource if unable to come for ov. Given contact information. Goals        Patient [address] (pt-stated)            10/19/17- NN did not discuss this with patient. Will plan to discuss with patient during upcoming call. Sc  4/23/18- NN encouraged patient to complete when she comes for her next follow-up visit. Patient verbalized understanding and will schedule. pk  5/21/18- Patient remains full code. Stating Mother is her legal next of kin, will make decisions for her.  pk    07/23/18   · Patient remains full code, says mother will make ACP decisions, not interested in completing form at this time.  Patient/Family verbalizes understanding of self-management of  disease. (pt-stated)            11/29/17- Patient verbalized understanding of hypokalemia and treatment plan was review with patient PK    How can you care for yourself at home? · If your doctor recommends it, eat foods that have a lot of potassium. These include fresh fruits, juices, and vegetables. They also include nuts, beans, and milk. · Be safe with medicines. If your doctor prescribes medicines or potassium supplements, take them exactly as directed. Call your doctor if you have any problems with your medicines. · Get your potassium levels tested as often as your doctor tells you.    4/25/18- Patient verbalized understanding of plan of care, attending dialysis and appointments as scheduled. Was seen by [facility] on 4/23/18. pk    4/30/18-   · Patient educated on diet restrictions at discharge,  · NN will continue attempts to reach for follow-up assessment and educate on diet restrictions. · Patient attending dialysis at this time on 5/1/18. pk  ·   07/23/18   · H-Dialysis continure on TTH and Sat, Patient verbalized understanding pk  · Patient verbalized decline for home care at this time. pk    5/21/18- discharge instructions and medication reconciliation reviewed. Patient verbalized understanding of plan of care. pk  6/29/18 Reviewed discharge instructions and medications with patient. Reminded to call Cheryle Net, for f/u appt per hospital rec. Also to schedule f/u with surgeon, , for 2 weeks, and Dr.Deep Feliz(neph) for one week. NN sched SURJIT with pcp, will see  on 7/2 at 11am.mbt    07/23/18   · Verbalized understanding of disease process, when and how to call for help. · NN reviewed discharge instructions and ask patient to log her BPs in log book to bring to office. · NN will follow up in one week. pk    07/27/18   · Patient sent to [address] [place] [address] for SOB during Dialysis on 7/26/18. Patient without acute distress on admission to ED. O2 sat at 96%. · Patient discharged home-no change in plan of care  · NN will follow in one week. pk  8/21/18- [address] [place] [address] 8/15-8/20. Reminded to check bp qd and record results. Notify NN/provider of elevated levels- > 170/90.  Prevention of infection (pt-stated)            10/19/17- patient will assess/closely monitor her temperatures  and report fevers greater than 101. Patient will assess hydration status by consuming at least six cups of water to avoid dehydration. Patient will take Levaquin as prescribed. sc  10/31/17- NN instructed patient on importance of taking all medications as ordered, follow-ups as scheduled- PK    Call your doctor now or seek immediate medical care if:  ? · You cough up dark brown or bloody mucus (sputum). ? · You have new or worse trouble breathing. ? · You are dizzy or lightheaded, or you feel like you may faint. ? Watch closely for changes in your health, and be sure to contact your doctor if:  ? · You have a new or higher fever. ? · You are coughing more deeply or more often. ? · You are not getting better after 2 days (48 hours). 6/29/18 ? · You do not get better as expected. [address] [place] [address] 6/16-6/28 sepsis/pneumonia. Reviewed signs/symptoms above to watch for and notify NN/provider. Scheduled SURJIT for 7/2 11am with . And reminded of importance of this visit. (reminded she has appt at 1pm with neuro). mbt             4/23/18- Discharge instructions, appointments and medications reviewed. Patient verbalized understanding. pk    5/1/18- discharged on Levaquin 500mg every 48 hrs,take after Dialysis   5/21/18- Patient was discharged on Augmentin for 7 days, started on 5/18-5/25/18. Home with drainage bag, instructed on flushing and care. pk         Other     Attends follow-up appointments as directed.             07/27/18   · Patient canceled PCP follow-up for today due to Ed visit on 7/26/18  · Re-scheduled for follow-up with PCP on 7/31/18  · NN will follow up in one week. pk    8/21/18  [facility] 8/15=8/20. SURJIT with pcp is 8/23 3pm with KHANH Suero. Reminded to schedule f/u with rheumatologist and nephrologist.mbt       Patient maintains an effective breathing pattern, as evidenced by relaxed breathing at normal rate and depth and absence of dyspnea.            07/27/18  · Patient instructed on breathing exercises, how to maintain a clear airway by encouraging patient to mobilize own secretions with successful coughing. This facilitates adequate clearance of secretions. · Patient educated on Evaluating level of anxiety. Hypoxia and sensation of not being able to breathe are frightening and may worsen hypoxia. · Instructed on scheduling activities to avoid fatigue and provide for rest periods, limiting fluids, and sodium to reduce fluid overload. · NN will follow up in one week    8/21/18- [facility] 8/15-8/20  · Denies any sob, reminded of above recommendations regarding providing for rest periods, limiting fluids and sodium to reduce fluid overload.  Reporting any symptoms asap.mbt

## 2018-08-27 ENCOUNTER — OFFICE VISIT (OUTPATIENT)
Dept: NEUROLOGY | Age: 33
End: 2018-08-27

## 2018-08-27 VITALS
SYSTOLIC BLOOD PRESSURE: 140 MMHG | HEIGHT: 65 IN | DIASTOLIC BLOOD PRESSURE: 94 MMHG | WEIGHT: 143 LBS | BODY MASS INDEX: 23.82 KG/M2 | HEART RATE: 57 BPM | OXYGEN SATURATION: 99 %

## 2018-08-27 DIAGNOSIS — N18.6 ESRD ON HEMODIALYSIS (HCC): ICD-10-CM

## 2018-08-27 DIAGNOSIS — Z99.2 ESRD ON HEMODIALYSIS (HCC): ICD-10-CM

## 2018-08-27 DIAGNOSIS — M54.50 CHRONIC BILATERAL LOW BACK PAIN WITHOUT SCIATICA: Primary | ICD-10-CM

## 2018-08-27 DIAGNOSIS — G89.29 CHRONIC BILATERAL LOW BACK PAIN WITHOUT SCIATICA: Primary | ICD-10-CM

## 2018-08-27 RX ORDER — OXYCODONE AND ACETAMINOPHEN 5; 325 MG/1; MG/1
TABLET ORAL
Qty: 75 TAB | Refills: 0 | Status: ON HOLD | OUTPATIENT
Start: 2018-08-27 | End: 2018-09-19

## 2018-08-27 NOTE — PROGRESS NOTES
Interval HPI:     This is a 29 y.o. female who is following up for     PMHx: chronic back pain, hx of fibromyalgia, hx of DVT/ coumadin, hx of Lupus, mild lower lumbar disc degeneration (L4-5 and L5-S1 with small annular tear at L5-S1)    Chief Complaint   Patient presents with    Pain (Chronic)     Mother accompanies her to today's visit    Continues to describe pain in lower back moderate to severe. Right rib pain is improved (had chest tube). Significant unintentional weight loss over time (now on hemodialysis), but today's weight is 143 lbs, height 5'4\" so on the low side of normal for her height. Most days pain is ranging from 4-5/ 10 with medication, 7-9/ 10 without medication. Taking Percocet 5/325 one tab BID (says about 3 days a week takes a 3rd tablet). Mother hold medications at home; says forgot to bring them with her today but estimates she has 4-5 left. UDS last visit was negative for Oxycodone. Pt reported running out of meds 1.5 days prior to the visit, and also does Dialysis    Tried/ failed: Gabapentin, Cymbalta, Amitriptyline (only helped get to sleep), Lyrica (abnormal behaviors), Hydrocodone (\"too strong\")    Reviewed : last filled Rx Percocet 5/ 325 (#75 tabs) on 7-30-18    UDS Hx:  March 2017: consistent with Rx pain medication  9-13-17 UDS: only screened for codeine or morphine, which pt doesn't take  1-3-18: serum Drug screen: consistent with Rx pain medication (oxycodone)        Brief ROS: as above      Past Medical History:   Diagnosis Date    Anemia     secondary to lupus    Asthma     no inhaler use in past 2 to 3 years    Carditis     Chronic kidney disease     ESRD    Chronic pain     DDD (degenerative disc disease), lumbar     [facility]     GERD (gastroesophageal reflux disease)     Hemodialysis patient ([facility] 12/21/2017    [address]  Tuesday,  Thursday,  and Saturday.      Hypercholesterolemia     Hypertension  Intractable nausea and vomiting 10/21/2015    Long term (current) use of anticoagulants     Lupus (systemic lupus erythematosus) (HCC)     Malignant hypertension with chronic kidney disease stage V [facility]     Peritoneal dialysis status ([facility] 10/2015    x 2 years Stopped 2017 due to infection and removed.  Poor historian 2018    With medications    Thromboembolus ([facility] 2013    lungs    Transfusion history     Last Transfusion 2017  at [facility]       Past Surgical History:   Procedure Laterality Date    HX  SECTION  11/2006    x1    HX OTHER SURGICAL  9/16/15    INSERTION PD CATH; Removed 2017    HX VASCULAR ACCESS Right 2017    Double-Lumen henry catheter upper chest       Family History   Problem Relation Age of Onset    Diabetes Father     Hypertension Father     Cancer Other      aunt with breast cancer    Diabetes Mother        Social History     Social History    Marital status: SINGLE     Spouse name: N/A    Number of children: N/A    Years of education: N/A     Occupational History    Not on file. Social History Main Topics    Smoking status: Never Smoker    Smokeless tobacco: Never Used    Alcohol use No    Drug use: Yes     Special: Prescription, OTC    Sexual activity: Not Currently     Other Topics Concern     Service No    Blood Transfusions No    Caffeine Concern No    Occupational Exposure No    Hobby Hazards No    Sleep Concern No    Stress Concern No    Weight Concern No    Special Diet No    Back Care Yes     low back pain    Exercise No    Bike Helmet No    Seat Belt Yes    Self-Exams No     Social History Narrative    Lives with parents and daughter.        Allergies   Allergen Reactions    Compazine [Prochlorperazine Edisylate] Nausea and Vomiting and Palpitations     Current Outpatient Prescriptions   Medication Sig Dispense Refill    oxyCODONE-acetaminophen (PERCOCET) 5-325 mg per tablet Take 1 tablet, two to three times a day, if needed for severe pain. 75 Tab 0    cloNIDine HCl (CATAPRES) 0.3 mg tablet Take 1 Tab by mouth three (3) times daily. 90 Tab 0    carvedilol (COREG) 25 mg tablet Take 1 Tab by mouth two (2) times daily (with meals). 60 Tab 0    amLODIPine (NORVASC) 10 mg tablet Take 1 Tab by mouth daily. 30 Tab 0    apixaban (ELIQUIS) 5 mg tablet Take 5 mg by mouth two (2) times a day. Indications: pulmonary thromboembolism      polyethylene glycol (MIRALAX) 17 gram packet Take 17 g by mouth daily.  predniSONE (DELTASONE) 5 mg tablet Take 2 Tabs by mouth daily. 30 Tab 0    senna (SENNA) 8.6 mg tablet Take 1 Tab by mouth daily as needed for Constipation. for constipation      amitriptyline (ELAVIL) 25 mg tablet Take 25 mg by mouth nightly.  fluticasone-vilanterol (BREO ELLIPTA) 200-25 mcg/dose inhaler Take 1 Puff by inhalation daily. Rinse mouth out after use 1 Inhaler 5    gemfibrozil (LOPID) 600 mg tablet Take 300 mg by mouth daily.  azaTHIOprine (IMURAN) 50 mg tablet Take 50 mg by mouth daily (after breakfast).  albuterol (PROVENTIL HFA, VENTOLIN HFA, PROAIR HFA) 90 mcg/actuation inhaler Take 1-2 Puffs by inhalation every four (4) hours as needed for Wheezing or Shortness of Breath. 1 Inhaler 2    hydroxychloroquine (PLAQUENIL) 200 mg tablet Take 200 mg by mouth two (2) times a day.  pantoprazole (PROTONIX) 40 mg tablet Take 1 Tab by mouth Daily (before breakfast).  30 Tab 0       Physical Exam  Vitals:    08/27/18 1514   BP: (!) 140/94   Pulse: (!) 57   SpO2: 99%   Weight: 64.9 kg (143 lb)   Height: 5' 5\" (1.651 m)       Awake, resting in wheelchair, cachectic, conversant  Appears generally weak  Ext: dialysis fistula in right forearm    MSK:  Lumbar spine:  + + pain with back extension > flexion      Focused Neurological Exam     Mental status:   Alert and oriented to person, place situation  Mood appears stable  Normal thought processes    CNs: EOMI, Face symmetric, Hearing/ Language normal  Sensory: intact light touch in both legs  Motor: 4/ 5 strength in arms and legs    Reflexes: not tested  Gait: stands, ambulates short distance     Impression    ICD-10-CM ICD-9-CM    1. Chronic bilateral low back pain without sciatica M54.5 724.2 oxyCODONE-acetaminophen (PERCOCET) 5-325 mg per tablet    G89.29 338.29    2. ESRD on hemodialysis ([facility]) N18.6 585.6     Z99.2 V45.11         1) Chronic bilateral lower back pain without sciatica  Renewed Rx Percocet 5/ 325 one tab BID-TID depending on pain level (#75, no RF)    2) ESRD/ hemodialysis  Followed by Nephrology.   Advised pt to discuss weight loss with her Nephrologist and ask for any suggestions to increase weight slightly    3) Follow up in 4 weeks       Signed By: Linda Roger MD     August 27, 2018

## 2018-08-27 NOTE — PROGRESS NOTES
Pill count = patient did not bring        UDS obtained and sent = 7/30/18  Pain contract = on file    made available for MD review.

## 2018-08-27 NOTE — MR AVS SNAPSHOT
[address] [facility] 
 
 
 [place] [address] [facility] [address] [phone] Patient: Tutu Dasilva MRN: [id number] :1985 Visit Information Date & Time Provider Department Dept. Phone Encounter #  
 2018  3:00 PM Marii Sanchez MD [facility] Neurology [facility] [phone] [id number] Follow-up Instructions Return in about 4 weeks (around 2018). Your Appointments 2018  4:00 PM  
ESTABLISHED PATIENT with Kamila Gipson MD  
[facility] (RAMANDEEP SCHEDULING) Appt Note: appt schd by Montez Mcfarland NP hosp f/u HTN kmr; appt schd by Montez Mcfarland NP hosp f/u HTN resched 2018 to 2018; appt schd by Montez Mcfarland NP hosp f/u HTN kmr; pt r/s from   
 [address]  
[address] [place] [address]  
  
    
 2018  3:00 PM  
Follow Up with Marii Sanchez MD  
[facility]) Appt Note: follow [facility] pain t [facility] [address] [facility] [address] [phone]  
  
   
 [facility] [place], operated [facility]  
  
    
 2018  1:00 PM  
ECHO CARDIOGRAMS 2D with Casie Gamboa [facility] (RAMANDEEP SCHEDULING) Appt Note: appt schd by Montez Mcfarland NP echo for MR 1:00 Dr. Morgan Speak 2:00 [address]  [address] [place] [address]  
[address] [facility]  
  
    
 2018  2:00 PM  
ESTABLISHED PATIENT with Kamila Gipson MD  
[facility] ([address] [facility]) Appt Note: appt schd by Montez Mcfarland NP echo for MR 1:00 Dr. Morgan Speak 2:00 [address]  [address] [place] [address]  
[phone]  
  
    
  
 2018 11:15 AM  
 TRANSITIONAL CARE MANAGEMENT with Uzair Sullivan  [facility] ([address]) Appt Note: Hospital follow up transitional care/dx:  Sob (Shortness Of Breath; R/S Hospital follow up transitional care/dx: Sob ([address] [place] [address]  
[phone]  
  
   
 [address] [address] Upcoming Health Maintenance Date Due DTaP/Tdap/Td series (1 - Tdap) 2/17/2007 Pneumococcal 19-64 Highest Risk (2 of 3 - PCV13) 10/1/2010 Influenza Age 5 to Adult 8/1/2018 PAP AKA CERVICAL CYTOLOGY 4/13/2019 MEDICARE YEARLY EXAM 8/1/2019 Allergies as of 8/27/2018  Review Complete On: 8/27/2018 By: Ronald Rosenberg LPN Severity Noted Reaction Type Reactions Compazine [Prochlorperazine Edisylate] High 07/11/2016   Systemic Nausea and Vomiting, Palpitations Current Immunizations  Reviewed on 8/20/2018 Name Date Influenza Vaccine 9/12/2017, 9/9/2012 Influenza Vaccine Split 9/1/2011 ZZZ-RETIRED (DO NOT USE) Pneumococcal Vaccine (Unspecified Type) 10/1/2009 Not reviewed this visit You Were Diagnosed With   
  
 Codes Comments Chronic bilateral low back pain without sciatica    -  Primary ICD-10-CM: M54.5, G89.29 ICD-9-CM: 724.2, 338.29   
 ESRD on hemodialysis ([facility] [address]     ICD-10-CM: N18.6, Z99.2 ICD-9-CM: 585.6, V45.11 Vitals BP Pulse Height(growth percentile) Weight(growth percentile) SpO2 BMI  
 (!) 140/94 (!) 57 5' 5\" (1.651 m) 143 lb (64.9 kg) 99% 23.8 kg/m2 OB Status Smoking Status Medically Induced Never Smoker BMI and BSA Data Body Mass Index Body Surface Area  
 23.8 kg/m 2 1.73 m 2 Preferred Pharmacy Pharmacy Name Phone [address], [facility] [address] Your Updated Medication List  
  
   
 This list is accurate as of 8/27/18  3:41 PM.  Always use your most recent med list.  
  
  
  
  
 albuterol 90 mcg/actuation inhaler Commonly known as:  PROVENTIL HFA, VENTOLIN HFA, PROAIR HFA Take 1-2 Puffs by inhalation every four (4) hours as needed for Wheezing or Shortness of Breath. amitriptyline 25 mg tablet Commonly known as:  ELAVIL Take 25 mg by mouth nightly. amLODIPine 10 mg tablet Commonly known as:  [place] Citron Take 1 Tab by mouth daily. apixaban 5 mg tablet Commonly known as:  Arden Ubaldo Take 5 mg by mouth two (2) times a day. Indications: pulmonary thromboembolism  
  
 azaTHIOprine 50 mg tablet Commonly known as:  The Pepsi Take 50 mg by mouth daily (after breakfast). carvedilol 25 mg tablet Commonly known as:  Amanda Golas Take 1 Tab by mouth two (2) times daily (with meals). cloNIDine HCl 0.3 mg tablet Commonly known as:  CATAPRES Take 1 Tab by mouth three (3) times daily. fluticasone-vilanterol 200-25 mcg/dose inhaler Commonly known as:  BREO ELLIPTA Take 1 Puff by inhalation daily. Rinse mouth out after use  
  
 gemfibrozil 600 mg tablet Commonly known as:  LOPID Take 300 mg by mouth daily. oxyCODONE-acetaminophen 5-325 mg per tablet Commonly known as:  PERCOCET Take 1 tablet, two to three times a day, if needed for severe pain. pantoprazole 40 mg tablet Commonly known as:  PROTONIX Take 1 Tab by mouth Daily (before breakfast). PLAQUENIL 200 mg tablet Generic drug:  hydroxychloroquine Take 200 mg by mouth two (2) times a day. polyethylene glycol 17 gram packet Commonly known as:  Efrain Boer Take 17 g by mouth daily. predniSONE 5 mg tablet Commonly known as:  Sharmila Pound Take 2 Tabs by mouth daily. Senna 8.6 mg tablet Generic drug:  senna Take 1 Tab by mouth daily as needed for Constipation. for constipation Prescriptions Printed Refills oxyCODONE-acetaminophen (PERCOCET) 5-325 mg per tablet 0 Sig: Take 1 tablet, two to three times a day, if needed for severe pain. Class: Print Follow-up Instructions Return in about 4 weeks (around 9/24/2018). Introducing [facility] SERVICES! [facility] introduces [employer] patient portal. Now you can access parts of your medical record, email your doctor's office, and request medication refills online. 1. In your internet browser, go to [email or web address] 2. Click on the First Time User? Click Here link in the Sign In box. You will see the New Member Sign Up page. 3. Enter your [employer] Access Code exactly as it appears below. You will not need to use this code after youve completed the sign-up process. If you do not sign up before the expiration date, you must request a new code. · [employer] Access Code: [email or web address] Expires: 9/1/2018  9:07 PM 
 
4. Enter the last four digits of your Social Security Number (xxxx) and Date of Birth (mm/dd/yyyy) as indicated and click Submit. You will be taken to the next sign-up page. 5. Create a [employer] ID. This will be your [employer] login ID and cannot be changed, so think of one that is secure and easy to remember. 6. Create a [employer] password. You can change your password at any time. 7. Enter your Password Reset Question and Answer. This can be used at a later time if you forget your password. 8. Enter your e-mail address. You will receive e-mail notification when new information is available in [address] 9. Click Sign Up. You can now view and download portions of your medical record. 10. Click the Download Summary menu link to download a portable copy of your medical information. If you have questions, please visit the Frequently Asked Questions section of the [employer] website. Remember, [employer] is NOT to be used for urgent needs. For medical emergencies, dial 911. Now available from your iPhone and Android! Please provide this summary of care documentation to your next provider. Your primary care clinician is listed as Cal Padilla. If you have any questions after today's visit, please call [phone].

## 2018-08-30 ENCOUNTER — PATIENT OUTREACH (OUTPATIENT)
Dept: FAMILY MEDICINE CLINIC | Age: 33
End: 2018-08-30

## 2018-09-10 ENCOUNTER — HOSPITAL ENCOUNTER (INPATIENT)
Age: 33
LOS: 9 days | Discharge: HOME | DRG: 304 | End: 2018-09-19
Attending: EMERGENCY MEDICINE | Admitting: INTERNAL MEDICINE
Payer: MEDICARE

## 2018-09-10 ENCOUNTER — APPOINTMENT (OUTPATIENT)
Dept: CT IMAGING | Age: 33
DRG: 304 | End: 2018-09-10
Attending: INTERNAL MEDICINE
Payer: MEDICARE

## 2018-09-10 DIAGNOSIS — R77.8 TROPONIN LEVEL ELEVATED: Primary | ICD-10-CM

## 2018-09-10 DIAGNOSIS — L93.2 OTHER LOCAL LUPUS ERYTHEMATOSUS: ICD-10-CM

## 2018-09-10 DIAGNOSIS — G89.29 CHRONIC BILATERAL LOW BACK PAIN WITHOUT SCIATICA: ICD-10-CM

## 2018-09-10 DIAGNOSIS — I10 ESSENTIAL HYPERTENSION: Chronic | ICD-10-CM

## 2018-09-10 DIAGNOSIS — N18.9 ANEMIA OF RENAL DISEASE: ICD-10-CM

## 2018-09-10 DIAGNOSIS — Z86.711 HISTORY OF PULMONARY EMBOLISM: ICD-10-CM

## 2018-09-10 DIAGNOSIS — D70.9 NEUTROPENIA, UNSPECIFIED TYPE (HCC): ICD-10-CM

## 2018-09-10 DIAGNOSIS — M54.50 CHRONIC BILATERAL LOW BACK PAIN WITHOUT SCIATICA: ICD-10-CM

## 2018-09-10 DIAGNOSIS — D63.1 ANEMIA OF RENAL DISEASE: ICD-10-CM

## 2018-09-10 PROBLEM — I16.0 MALIGNANT HYPERTENSIVE URGENCY: Status: ACTIVE | Noted: 2018-09-10

## 2018-09-10 LAB
ALBUMIN SERPL-MCNC: 3.6 G/DL (ref 3.5–5)
ALBUMIN/GLOB SERPL: 0.7 {RATIO} (ref 1.1–2.2)
ALP SERPL-CCNC: 77 U/L (ref 45–117)
ALT SERPL-CCNC: 23 U/L (ref 12–78)
ANION GAP SERPL CALC-SCNC: 14 MMOL/L (ref 5–15)
AST SERPL-CCNC: 34 U/L (ref 15–37)
BASOPHILS # BLD: 0 K/UL (ref 0–0.1)
BASOPHILS NFR BLD: 1 % (ref 0–1)
BILIRUB SERPL-MCNC: 0.4 MG/DL (ref 0.2–1)
BUN SERPL-MCNC: 100 MG/DL (ref 6–20)
BUN/CREAT SERPL: 10 (ref 12–20)
CALCIUM SERPL-MCNC: 8.4 MG/DL (ref 8.5–10.1)
CHLORIDE SERPL-SCNC: 98 MMOL/L (ref 97–108)
CO2 SERPL-SCNC: 24 MMOL/L (ref 21–32)
COMMENT, HOLDF: NORMAL
CREAT SERPL-MCNC: 9.71 MG/DL (ref 0.55–1.02)
DIFFERENTIAL METHOD BLD: ABNORMAL
EOSINOPHIL # BLD: 0 K/UL (ref 0–0.4)
EOSINOPHIL NFR BLD: 1 % (ref 0–7)
ERYTHROCYTE [DISTWIDTH] IN BLOOD BY AUTOMATED COUNT: 15.6 % (ref 11.5–14.5)
GLOBULIN SER CALC-MCNC: 5.1 G/DL (ref 2–4)
GLUCOSE SERPL-MCNC: 89 MG/DL (ref 65–100)
HCT VFR BLD AUTO: 36.8 % (ref 35–47)
HGB BLD-MCNC: 11.2 G/DL (ref 11.5–16)
IMM GRANULOCYTES # BLD: 0 K/UL (ref 0–0.04)
IMM GRANULOCYTES NFR BLD AUTO: 0 % (ref 0–0.5)
LYMPHOCYTES # BLD: 0.6 K/UL (ref 0.8–3.5)
LYMPHOCYTES NFR BLD: 19 % (ref 12–49)
MCH RBC QN AUTO: 28.4 PG (ref 26–34)
MCHC RBC AUTO-ENTMCNC: 30.4 G/DL (ref 30–36.5)
MCV RBC AUTO: 93.4 FL (ref 80–99)
MONOCYTES # BLD: 0.1 K/UL (ref 0–1)
MONOCYTES NFR BLD: 5 % (ref 5–13)
NEUTS SEG # BLD: 2.2 K/UL (ref 1.8–8)
NEUTS SEG NFR BLD: 74 % (ref 32–75)
NRBC # BLD: 0 K/UL (ref 0–0.01)
NRBC BLD-RTO: 0 PER 100 WBC
PLATELET # BLD AUTO: 184 K/UL (ref 150–400)
PMV BLD AUTO: 10.8 FL (ref 8.9–12.9)
POTASSIUM SERPL-SCNC: 5.6 MMOL/L (ref 3.5–5.1)
PROT SERPL-MCNC: 8.7 G/DL (ref 6.4–8.2)
RBC # BLD AUTO: 3.94 M/UL (ref 3.8–5.2)
RBC MORPH BLD: ABNORMAL
SAMPLES BEING HELD,HOLD: NORMAL
SODIUM SERPL-SCNC: 136 MMOL/L (ref 136–145)
TROPONIN I SERPL-MCNC: 0.36 NG/ML
WBC # BLD AUTO: 2.9 K/UL (ref 3.6–11)

## 2018-09-10 PROCEDURE — 96374 THER/PROPH/DIAG INJ IV PUSH: CPT

## 2018-09-10 PROCEDURE — 93005 ELECTROCARDIOGRAM TRACING: CPT

## 2018-09-10 PROCEDURE — 84484 ASSAY OF TROPONIN QUANT: CPT | Performed by: EMERGENCY MEDICINE

## 2018-09-10 PROCEDURE — 74011250637 HC RX REV CODE- 250/637: Performed by: EMERGENCY MEDICINE

## 2018-09-10 PROCEDURE — 80053 COMPREHEN METABOLIC PANEL: CPT | Performed by: EMERGENCY MEDICINE

## 2018-09-10 PROCEDURE — 85025 COMPLETE CBC W/AUTO DIFF WBC: CPT | Performed by: EMERGENCY MEDICINE

## 2018-09-10 PROCEDURE — 74011000250 HC RX REV CODE- 250: Performed by: EMERGENCY MEDICINE

## 2018-09-10 PROCEDURE — 99284 EMERGENCY DEPT VISIT MOD MDM: CPT

## 2018-09-10 PROCEDURE — 36415 COLL VENOUS BLD VENIPUNCTURE: CPT | Performed by: EMERGENCY MEDICINE

## 2018-09-10 PROCEDURE — 65270000029 HC RM PRIVATE

## 2018-09-10 PROCEDURE — 70450 CT HEAD/BRAIN W/O DYE: CPT

## 2018-09-10 RX ORDER — SENNOSIDES 8.6 MG/1
1 TABLET ORAL
Status: DISCONTINUED | OUTPATIENT
Start: 2018-09-10 | End: 2018-09-19 | Stop reason: HOSPADM

## 2018-09-10 RX ORDER — GEMFIBROZIL 600 MG/1
300 TABLET, FILM COATED ORAL DAILY
Status: DISCONTINUED | OUTPATIENT
Start: 2018-09-11 | End: 2018-09-19 | Stop reason: HOSPADM

## 2018-09-10 RX ORDER — IPRATROPIUM BROMIDE AND ALBUTEROL SULFATE 2.5; .5 MG/3ML; MG/3ML
3 SOLUTION RESPIRATORY (INHALATION)
Status: DISCONTINUED | OUTPATIENT
Start: 2018-09-10 | End: 2018-09-19 | Stop reason: HOSPADM

## 2018-09-10 RX ORDER — HYDROXYCHLOROQUINE SULFATE 200 MG/1
200 TABLET, FILM COATED ORAL 2 TIMES DAILY
Status: DISCONTINUED | OUTPATIENT
Start: 2018-09-11 | End: 2018-09-19 | Stop reason: HOSPADM

## 2018-09-10 RX ORDER — SODIUM CHLORIDE 0.9 % (FLUSH) 0.9 %
5-10 SYRINGE (ML) INJECTION EVERY 8 HOURS
Status: DISCONTINUED | OUTPATIENT
Start: 2018-09-10 | End: 2018-09-19 | Stop reason: HOSPADM

## 2018-09-10 RX ORDER — PREDNISONE 5 MG/1
10 TABLET ORAL DAILY
Status: DISCONTINUED | OUTPATIENT
Start: 2018-09-11 | End: 2018-09-19 | Stop reason: HOSPADM

## 2018-09-10 RX ORDER — AZATHIOPRINE 50 MG/1
50 TABLET ORAL
Status: DISCONTINUED | OUTPATIENT
Start: 2018-09-11 | End: 2018-09-19 | Stop reason: HOSPADM

## 2018-09-10 RX ORDER — ONDANSETRON 2 MG/ML
4 INJECTION INTRAMUSCULAR; INTRAVENOUS
Status: DISCONTINUED | OUTPATIENT
Start: 2018-09-10 | End: 2018-09-19 | Stop reason: HOSPADM

## 2018-09-10 RX ORDER — FENTANYL CITRATE 50 UG/ML
25 INJECTION, SOLUTION INTRAMUSCULAR; INTRAVENOUS
Status: DISCONTINUED | OUTPATIENT
Start: 2018-09-10 | End: 2018-09-11

## 2018-09-10 RX ORDER — PANTOPRAZOLE SODIUM 40 MG/1
40 TABLET, DELAYED RELEASE ORAL
Status: DISCONTINUED | OUTPATIENT
Start: 2018-09-11 | End: 2018-09-19 | Stop reason: HOSPADM

## 2018-09-10 RX ORDER — ACETAMINOPHEN 325 MG/1
650 TABLET ORAL
Status: DISCONTINUED | OUTPATIENT
Start: 2018-09-10 | End: 2018-09-13

## 2018-09-10 RX ORDER — LABETALOL HYDROCHLORIDE 5 MG/ML
20 INJECTION, SOLUTION INTRAVENOUS
Status: COMPLETED | OUTPATIENT
Start: 2018-09-10 | End: 2018-09-10

## 2018-09-10 RX ORDER — LOSARTAN POTASSIUM 25 MG/1
25 TABLET ORAL DAILY
COMMUNITY
End: 2018-09-19

## 2018-09-10 RX ORDER — SODIUM CHLORIDE 0.9 % (FLUSH) 0.9 %
5-10 SYRINGE (ML) INJECTION AS NEEDED
Status: DISCONTINUED | OUTPATIENT
Start: 2018-09-10 | End: 2018-09-19 | Stop reason: HOSPADM

## 2018-09-10 RX ORDER — OXYCODONE HYDROCHLORIDE 5 MG/1
10 TABLET ORAL
Status: COMPLETED | OUTPATIENT
Start: 2018-09-10 | End: 2018-09-10

## 2018-09-10 RX ORDER — GUAIFENESIN 100 MG/5ML
81 LIQUID (ML) ORAL DAILY
Status: DISCONTINUED | OUTPATIENT
Start: 2018-09-11 | End: 2018-09-19 | Stop reason: HOSPADM

## 2018-09-10 RX ORDER — HYDROCODONE BITARTRATE AND ACETAMINOPHEN 5; 325 MG/1; MG/1
1 TABLET ORAL
Status: DISCONTINUED | OUTPATIENT
Start: 2018-09-10 | End: 2018-09-11

## 2018-09-10 RX ORDER — POLYETHYLENE GLYCOL 3350 17 G/17G
17 POWDER, FOR SOLUTION ORAL
Status: DISCONTINUED | OUTPATIENT
Start: 2018-09-10 | End: 2018-09-19 | Stop reason: HOSPADM

## 2018-09-10 RX ORDER — AMITRIPTYLINE HYDROCHLORIDE 50 MG/1
25 TABLET, FILM COATED ORAL
Status: DISCONTINUED | OUTPATIENT
Start: 2018-09-11 | End: 2018-09-19 | Stop reason: HOSPADM

## 2018-09-10 RX ORDER — CLONIDINE 0.3 MG/24H
1 PATCH, EXTENDED RELEASE TRANSDERMAL
COMMUNITY
End: 2018-09-19

## 2018-09-10 RX ORDER — HEPARIN SODIUM 5000 [USP'U]/ML
5000 INJECTION, SOLUTION INTRAVENOUS; SUBCUTANEOUS EVERY 8 HOURS
Status: DISCONTINUED | OUTPATIENT
Start: 2018-09-11 | End: 2018-09-11

## 2018-09-10 RX ORDER — LANOLIN ALCOHOL/MO/W.PET/CERES
325 CREAM (GRAM) TOPICAL
Status: DISCONTINUED | OUTPATIENT
Start: 2018-09-11 | End: 2018-09-11 | Stop reason: CLARIF

## 2018-09-10 RX ORDER — SODIUM POLYSTYRENE SULFONATE 15 G/60ML
15 SUSPENSION ORAL; RECTAL
Status: COMPLETED | OUTPATIENT
Start: 2018-09-10 | End: 2018-09-11

## 2018-09-10 RX ORDER — METOPROLOL TARTRATE 25 MG/1
25 TABLET, FILM COATED ORAL EVERY 6 HOURS
Status: DISCONTINUED | OUTPATIENT
Start: 2018-09-11 | End: 2018-09-15

## 2018-09-10 RX ADMIN — LABETALOL HYDROCHLORIDE 20 MG: 5 INJECTION, SOLUTION INTRAVENOUS at 18:25

## 2018-09-10 RX ADMIN — OXYCODONE HYDROCHLORIDE 10 MG: 5 TABLET ORAL at 19:09

## 2018-09-10 NOTE — IP AVS SNAPSHOT
[address] [facility] [address] [place] [address] 
[phone] Patient: Sana Bunch MRN: [id number] :1985 A check fahad indicates which time of day the medication should be taken. My Medications START taking these medications Instructions Each Dose to Equal  
 Morning Noon Evening Bedtime  
 minoxidil 2.5 mg tablet Commonly known as:  Elizabeth Gutiérrez Your last dose was: Your next dose is: Take 1 Tab by mouth two (2) times a day for 30 days. 2.5 mg  
    
   
   
   
  
  
CHANGE how you take these medications Instructions Each Dose to Equal  
 Morning Noon Evening Bedtime  
 losartan 100 mg tablet Commonly known as:  COZAAR What changed:   
- medication strength 
- how much to take Your last dose was: Your next dose is: Take 1 Tab by mouth daily for 30 days. 100 mg CONTINUE taking these medications Instructions Each Dose to Equal  
 Morning Noon Evening Bedtime  
 albuterol 90 mcg/actuation inhaler Commonly known as:  PROVENTIL HFA, VENTOLIN HFA, PROAIR HFA Your last dose was: Your next dose is: Take 1-2 Puffs by inhalation every four (4) hours as needed for Wheezing or Shortness of Breath. 1-2 Puff  
    
   
   
   
  
 amitriptyline 25 mg tablet Commonly known as:  ELAVIL Your last dose was: Your next dose is: Take 25 mg by mouth nightly. 25 mg  
    
   
   
   
  
 amLODIPine 10 mg tablet Commonly known as:  Cortez Amos Your last dose was: Your next dose is: Take 1 Tab by mouth daily. 10 mg  
    
   
   
   
  
 apixaban 5 mg tablet Commonly known as:  Symone Membreno Your last dose was: Your next dose is: Take 5 mg by mouth two (2) times a day. Indications: pulmonary thromboembolism 5 mg AURYXIA 210 mg iron tablet Generic drug:  ferric citrate Your last dose was: Your next dose is: Take 210 mg by mouth three (3) times daily (with meals). 210 mg  
    
   
   
   
  
 azaTHIOprine 50 mg tablet Commonly known as:  The Pepsi Your last dose was: Your next dose is: Take 50 mg by mouth daily (after breakfast). 50 mg  
    
   
   
   
  
 carvedilol 25 mg tablet Commonly known as:  Gaylin [place] Your last dose was: Your next dose is: Take 1 Tab by mouth two (2) times daily (with meals). 25 mg  
    
   
   
   
  
 cloNIDine 0.3 mg/24 hr  
Commonly known as:  CATAPRES Your last dose was: Your next dose is:    
   
   
 1 Patch by TransDERmal route every seven (7) days. 1 Patch  
    
   
   
   
  
 gemfibrozil 600 mg tablet Commonly known as:  LOPID Your last dose was: Your next dose is: Take 300 mg by mouth daily. 300 mg  
    
   
   
   
  
 oxyCODONE-acetaminophen 5-325 mg per tablet Commonly known as:  PERCOCET Your last dose was: Your next dose is: Take 1 tablet, two to three times a day, if needed for severe pain. pantoprazole 40 mg tablet Commonly known as:  PROTONIX Your last dose was: Your next dose is: Take 1 Tab by mouth Daily (before breakfast). 40 mg  
    
   
   
   
  
 PLAQUENIL 200 mg tablet Generic drug:  hydroxychloroquine Your last dose was: Your next dose is: Take 200 mg by mouth two (2) times a day. 200 mg  
    
   
   
   
  
 polyethylene glycol 17 gram packet Commonly known as:  Beola Vishal Your last dose was: Your next dose is: Take 17 g by mouth daily as needed. 17 g  
    
   
   
   
  
 predniSONE 5 mg tablet Commonly known as:  Ynes Mcarthur Your last dose was: Your next dose is: Take 2 Tabs by mouth daily. 10 mg Senna 8.6 mg tablet Generic drug:  senna Your last dose was: Your next dose is: Take 1 Tab by mouth daily as needed for Constipation. for constipation 1 Tab Where to Get Your Medications Information on where to get these meds will be given to you by the nurse or doctor. ! Ask your nurse or doctor about these medications  
  losartan 100 mg tablet  
 minoxidil 2.5 mg tablet

## 2018-09-10 NOTE — IP AVS SNAPSHOT
[address] [address] 
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 
[phone] Your Scheduled Appointments 2018 10:00 AM EDT TRANSITIONAL CARE MANAGEMENT with Thong Wagoner  [facility]) [address] [address], [facility]  
[phone] 2018  3:00 PM EDT Follow Up with Parul Garay MD  
[facility] [address] [facility] [address] [phone] Discharge Orders None A check fahad indicates which time of day the medication should be taken. My Medications START taking these medications Instructions Each Dose to Equal  
 Morning Noon Evening Bedtime  
 minoxidil 2.5 mg tablet Commonly known as:  Alley Kwon Your last dose was: Your next dose is: Take 1 Tab by mouth two (2) times a day for 30 days. 2.5 mg  
    
   
   
   
  
  
CHANGE how you take these medications Instructions Each Dose to Equal  
 Morning Noon Evening Bedtime  
 losartan 100 mg tablet Commonly known as:  COZAAR What changed:   
- medication strength 
- how much to take Your last dose was: Your next dose is: Take 1 Tab by mouth daily for 30 days. 100 mg CONTINUE taking these medications Instructions Each Dose to Equal  
 Morning Noon Evening Bedtime  
 albuterol 90 mcg/actuation inhaler Commonly known as:  PROVENTIL HFA, VENTOLIN HFA, PROAIR HFA Your last dose was: Your next dose is: Take 1-2 Puffs by inhalation every four (4) hours as needed for Wheezing or Shortness of Breath. 1-2 Puff  
    
   
   
   
  
 amitriptyline 25 mg tablet Commonly known as:  ELAVIL Your last dose was: Your next dose is: Take 25 mg by mouth nightly. 25 mg  
    
   
   
   
  
 amLODIPine 10 mg tablet Commonly known as:  Voncile [place] Your last dose was: Your next dose is: Take 1 Tab by mouth daily. 10 mg  
    
   
   
   
  
 apixaban 5 mg tablet Commonly known as:  Mariah Falling Your last dose was: Your next dose is: Take 5 mg by mouth two (2) times a day. Indications: pulmonary thromboembolism 5 mg AURYXIA 210 mg iron tablet Generic drug:  ferric citrate Your last dose was: Your next dose is: Take 210 mg by mouth three (3) times daily (with meals). 210 mg  
    
   
   
   
  
 azaTHIOprine 50 mg tablet Commonly known as:  The [employer] Your last dose was: Your next dose is: Take 50 mg by mouth daily (after breakfast). 50 mg  
    
   
   
   
  
 carvedilol 25 mg tablet Commonly known as:  Media [place] Your last dose was: Your next dose is: Take 1 Tab by mouth two (2) times daily (with meals). 25 mg  
    
   
   
   
  
 cloNIDine 0.3 mg/24 hr  
Commonly known as:  CATAPRES Your last dose was: Your next dose is:    
   
   
 1 Patch by TransDERmal route every seven (7) days. 1 Patch gemfibrozil 600 mg tablet Commonly known as:  LOPID Your last dose was: Your next dose is: Take 300 mg by mouth daily. 300 mg  
    
   
   
   
  
 oxyCODONE-acetaminophen 5-325 mg per tablet Commonly known as:  PERCOCET Your last dose was: Your next dose is: Take 1 tablet, two to three times a day, if needed for severe pain. pantoprazole 40 mg tablet Commonly known as:  PROTONIX Your last dose was: Your next dose is: Take 1 Tab by mouth Daily (before breakfast). 40 mg  
    
   
   
   
  
 PLAQUENIL 200 mg tablet Generic drug:  hydroxychloroquine Your last dose was: Your next dose is: Take 200 mg by mouth two (2) times a day. 200 mg  
    
   
   
   
  
 polyethylene glycol 17 gram packet Commonly known as:  Kathie Kehr Your last dose was: Your next dose is: Take 17 g by mouth daily as needed. 17 g  
    
   
   
   
  
 predniSONE 5 mg tablet Commonly known as:  Alicia Clancy Your last dose was: Your next dose is: Take 2 Tabs by mouth daily. 10 mg Senna 8.6 mg tablet Generic drug:  senna Your last dose was: Your next dose is: Take 1 Tab by mouth daily as needed for Constipation. for constipation 1 Tab Where to Get Your Medications Information on where to get these meds will be given to you by the nurse or doctor. ! Ask your nurse or doctor about these medications  
  losartan 100 mg tablet  
 minoxidil 2.5 mg tablet Opioid Education Prescription Opioids: What You Need to Know: 
 
 
ATTENDING PHYSICIAN: Ivelisse Joyce MD 
DISCHARGING PROVIDER: Ivelisse Joyce MD   
To contact this individual call [phone] and ask the  to page. If unavailable ask to be transferred the Adult Hospitalist Department. DISCHARGE DIAGNOSES ESRD with HTN urgency CONSULTATIONS: IP CONSULT TO CARDIOLOGY 
IP CONSULT TO NEPHROLOGY 
IP CONSULT TO HEMATOLOGY PROCEDURES/SURGERIES: * No surgery found * PENDING TEST RESULTS:  
At the time of discharge the following test results are still pending: FOLLOW UP APPOINTMENTS:  
Follow-up Information Follow up With Details Comments Contact Info Jarod Mora NP In 1 week  [address] 
[phone] ADDITIONAL CARE RECOMMENDATIONS:  
 
DIET: Renal Diet ACTIVITY: Activity as tolerated WOUND CARE:  
 
EQUIPMENT needed:  
 
 
  
 SNF/Inpatient Rehab/LTAC Independent/assisted living Hospice Other: CDMP Checked:  
Yes x PROBLEM LIST Updated: 
Yes x Signed:  
Lloyd Peterson MD 
9/19/2018 
7:28 AM 
 
ACO Transitions of Care Introducing [employer] [address] offers a voluntary care coordination program to provide high quality service and care to [facility] fee-for-service beneficiaries. Shakira Anne was designed to help you enhance your health and well-being through the following services: ? Transitions of Care  support for individuals who are transitioning from one care setting to another (example: Hospital to home). ? Chronic and Complex Care Coordination  support for individuals and caregivers of those with serious or chronic illnesses or with more than one chronic (ongoing) condition and those who take a number of different medications. If you meet specific medical criteria, a [facility] [address] may call you directly to coordinate your care with your primary care physician and your other care providers. For questions about the [facility] programs, please, contact your physicians office. For general questions or additional information about Accountable Care Organizations: 
Please visit www.medicare.gov/acos. html or call 1-800-MEDICARE (9-336.939.4778) TTY users should call 0-347.570.4267. [employer] Announcement We are excited to announce that we are making your provider's discharge notes available to you in [employer]. You will see these notes when they are completed and signed by the physician that discharged you from your recent hospital stay. If you have any questions or concerns about any information you see in [employer], please call the Health Information Department where you were seen or reach out to your Primary Care Provider for more information about your plan of care. Introducing [facility] & HEALTH SERVICES!    
 Keny Crews introduces [employer] patient portal. Now you can access parts of your medical record, email your doctor's office, and request medication refills online. 1. In your internet browser, go to [email or web address]. [email or web address] 2. Click on the First Time User? Click Here link in the Sign In box. You will see the New Member Sign Up page. 3. Enter your [employer] Access Code exactly as it appears below. You will not need to use this code after youve completed the sign-up process. If you do not sign up before the expiration date, you must request a new code. · [employer] Access Code: [email or web address] Expires: 12/9/2018  5:07 PM 
 
4. Enter the last four digits of your Social Security Number (xxxx) and Date of Birth (mm/dd/yyyy) as indicated and click Submit. You will be taken to the next sign-up page. 5. Create a [employer] ID. This will be your [employer] login ID and cannot be changed, so think of one that is secure and easy to remember. 6. Create a [employer] password. You can change your password at any time. 7. Enter your Password Reset Question and Answer. This can be used at a later time if you forget your password. 8. Enter your e-mail address. You will receive e-mail notification when new information is available in [address] 9. Click Sign Up. You can now view and download portions of your medical record. 10. Click the Download Summary menu link to download a portable copy of your medical information. If you have questions, please visit the Frequently Asked Questions section of the [employer] website. Remember, [employer] is NOT to be used for urgent needs. For medical emergencies, dial 911. Now available from your iPhone and Android! Introducing Nito Major As a [employer] patient, I wanted to make you aware of our electronic visit tool called Nito Major. [employer] 24/7 allows you to connect within minutes with a medical provider 24 hours a day, seven days a week via a mobile device or tablet or logging into a secure website from your computer. You can access LMN-1 from anywhere in the [place]. A virtual visit might be right for you when you have a simple condition and feel like you just dont want to get out of bed, or cant get away from work for an appointment, when your regular [facility] provider is not available (evenings, weekends or holidays), or when youre out of town and need minor care. Electronic visits cost only $49 and if the [employer] provider determines a prescription is needed to treat your condition, one can be electronically transmitted to a nearby pharmacy*. Please take a moment to enroll today if you have not already done so. The enrollment process is free and takes just a few minutes. To enroll, please download the invendo medical judith to your tablet or phone, or visit [email or web address] to enroll on your computer. And, as an [address] [place] [address] patient with a Maintenance Assistant account, the results of your visits will be scanned into your electronic medical record and your primary care provider will be able to view the scanned results. We urge you to continue to see your regular [facility] provider for your ongoing medical care. And while your primary care provider may not be the one available when you seek a Dobangopankajfin virtual visit, the peace of mind you get from getting a real diagnosis real time can be priceless. For more information on LMN-1, view our Frequently Asked Questions (FAQs) at [email or web address]. Sincerely, 
 
Deanne Mancilla MD 
Chief Medical Officer [address] *:  certain medications cannot be prescribed via LMN-1 Providers Seen During Your Hospitalization Provider Specialty Primary office phone Glen Diez MD Emergency Medicine [phone] Mauricio Osullivan MD Internal Medicine [phone] Alejandro Interiano MD Internal Medicine [phone] Darya Hirsch MD Internal Medicine [phone] Your Primary Care Physician (PCP) Primary Care Physician Office Phone Office Fax Claudine Samuels [phone] You are allergic to the following Allergen Reactions Compazine (Prochlorperazine Edisylate) Nausea and Vomiting Palpitations Recent Documentation Height Weight BMI OB Status Smoking Status 1.651 m 63.5 kg 23.3 kg/m2 Medically Induced Never Smoker Emergency Contacts Name Discharge Info Relation Home Work Mobile Louis Sparks CAREGIVER [3] Mother [14] [phone] Stew Lockhart CAREGIVER [3] Father [15] [phone] Justice  Parent [1] [phone] Patient Belongings The following personal items are in your possession at time of discharge: 
  Dental Appliances: None  Visual Aid: None      Home Medications: None   Jewelry: None  Clothing: At bedside    Other Valuables: Cell Phone, Darshan Please provide this summary of care documentation to your next provider. Signatures-by signing, you are acknowledging that this After Visit Summary has been reviewed with you and you have received a copy. Patient Signature:  ____________________________________________________________ Date:  ____________________________________________________________  
  
Deion Millan Provider Signature:  ____________________________________________________________ Date:  ____________________________________________________________

## 2018-09-10 NOTE — ED TRIAGE NOTES
Pt c/o nausea and generalized body aches since last night. Denies fevers, SOB, V/D, urinary s/sx. Reports s \"little chest pain. \" /142, reports taking BP meds today, denies headache, vision changes, numbness or tingling.

## 2018-09-10 NOTE — ED PROVIDER NOTES
HPI Comments: 29 y.o. female with past medical history significant for Anemia, Carditis, High cholesterol, Lupus, Asthma,  DDD, Thromboembolus, GERD, HTN, and CKD who presents from home via private vehicle with chief complaint of fatigue. Pt reports onset last night of fatigue with accompanying abdominal pain, generalized myalgia, nausea, dry heaving, and chest pain since today. Pt reports history of kidney disease, Lupus, and HTN. Pt recieves dialysis (Sat/Tues/Thurs) treatments, and reports she has not missed a session. Pt reports taking Carvedilol for HTN, but reports medication does not always control blood pressure, and that BP readings in the \"high 100s\" is normal. Pt reports today's symptoms feel similar to prior Lupus flares. Pt reports she has not seen Rheumatologist for \"a while\". Pt reports she has been taking Percocet for pain relief. Pt denies any fever, chills, emesis, diarrhea, or bowel changes. There are no other acute medical concerns at this time. Old Chart Review:  Pt has had prior ED visits for uncontrolled BP. Previous labs show pt has had increases in troponin level. Social hx: Non-smoker; No EtOH use    PCP: Katelyn Mcneil NP  Rheumatologist: Tracie Fernandez MD     Note written by Boy Castaneda, as dictated by Tori Diop MD 18:15 PM    The history is provided by the patient and medical records. No  was used. Past Medical History:   Diagnosis Date    Anemia     secondary to lupus    Asthma     no inhaler use in past 2 to 3 years    Carditis     Chronic kidney disease     ESRD    Chronic pain     DDD (degenerative disc disease), lumbar     ESRD (end stage renal disease) ([facility])     GERD (gastroesophageal reflux disease)     Hemodialysis patient ([facility] 12/21/2017    [address]  Tuesday,  Thursday,  and Saturday.      Hypercholesterolemia     Hypertension     Intractable nausea and vomiting 10/21/2015    Long term (current) use of anticoagulants     [facility]mat[facility]     Malignant hypertension with chronic kidney disease stage V ([facility]     Peritoneal dialysis status ([facility] 10/2015    x 2 years Stopped 2017 due to infection and removed.  Poor historian 2018    With medications    Thromboembolus ([facility] 2013    lungs    Transfusion history     Last Transfusion 2017  at [facility]       Past Surgical History:   Procedure Laterality Date    HX  SECTION  11/2006    x1    HX OTHER SURGICAL  9/16/15    INSERTION PD CATH; Removed 2017    HX VASCULAR ACCESS Right 2017    Double-Lumen henry catheter upper chest         Family History:   Problem Relation Age of Onset    Diabetes Father     Hypertension Father     Cancer Other      aunt with breast cancer    Diabetes Mother        Social History     Social History    Marital status: SINGLE     Spouse name: N/A    Number of children: N/A    Years of education: N/A     Occupational History    Not on file. Social History Main Topics    Smoking status: Never Smoker    Smokeless tobacco: Never Used    Alcohol use No    Drug use: Yes     Special: Prescription, OTC    Sexual activity: Not Currently     Other Topics Concern     Service No    Blood Transfusions No    Caffeine Concern No    Occupational Exposure No    Hobby Hazards No    Sleep Concern No    Stress Concern No    Weight Concern No    Special Diet No    Back Care Yes     low back pain    Exercise No    Bike Helmet No    Seat Belt Yes    Self-Exams No     Social History Narrative    Lives with parents and daughter. ALLERGIES: Compazine [prochlorperazine edisylate]    Review of Systems   Constitutional: Positive for fatigue. Negative for chills and fever. Respiratory: Negative for shortness of breath. Cardiovascular: Positive for chest pain. Gastrointestinal: Positive for abdominal pain and nausea.  Negative for constipation, diarrhea and vomiting. Musculoskeletal: Positive for myalgias (Generalized). Neurological: Negative for dizziness, light-headedness and headaches. All other systems reviewed and are negative. Vitals:    09/10/18 1711 09/10/18 1825 09/10/18 1842 09/10/18 1910   BP: (!) 205/142 (!) 180/138 (!) 163/124 (!) 172/133   Pulse: (!) 113 (!) 102 (!) 102 60   Resp: 16      Temp: 97.8 °F (36.6 °C)      SpO2: 97%      Weight: 61.9 kg (136 lb 6.4 oz)      Height: 5' 5\" (1.651 m)               Physical Exam   Constitutional: She appears well-developed. No distress. Chronically ill appearing. HENT:   Head: Normocephalic and atraumatic. Eyes: Pupils are equal, round, and reactive to light. No scleral icterus. Neck: Normal range of motion. Neck supple. Cardiovascular: An irregular rhythm present. Tachycardia present. Pt is tachycardic. Pulmonary/Chest: Effort normal and breath sounds normal. She has no decreased breath sounds. She has no wheezes. She has no rhonchi. She has no rales. Clear lungs. Abdominal: Soft. She exhibits no distension. There is no tenderness. There is no rebound and no guarding. Abdomen is soft, non tender, and non distended. Musculoskeletal: Normal range of motion. She exhibits no edema. Neurological: She is alert. Skin: Skin is warm and dry. She is not diaphoretic. Psychiatric: She has a normal mood and affect. Her behavior is normal. Thought content normal.   Nursing note and vitals reviewed. Note written by Boy Raymond, as dictated by Baldomero Gillespie MD 18:15 PM    MDM  Number of Diagnoses or Management Options  Essential hypertension: established and worsening  Other local lupus erythematosus: established and worsening  Troponin level elevated: established and worsening  Diagnosis management comments: Pt presents with SLE flare typical of previous flares complicated by elevated troponin and HTN. Concern for hypertensive emergency - no evidence of CHF or CVA. ED Course       Procedures    CONSULT NOTE:  7:01 PM Kashif Kelley MD communicated with Dr. Francisco Tabares, Consult for Hospitalist via [facility] Text. Discussed available diagnostic tests and clinical findings. Dr Francisco Tabares will see and admit pt. CONSULT NOTE:  7:57 PM Kashif Kelley MD spoke with Dr. Kiran Willard, Consult for Cardiology. Discussed available diagnostic tests and clinical findings. Dr. Kiran Willard agrees with treatment plan. EKG performed at 1739 show sinus tachycardia, rate 107, no ST changes, nonspecific t wave flattening, inversions, and biphasic waves, no ectopy. Patient admitted to Dr. Vazquez Fu service for continued management.

## 2018-09-11 LAB
ALBUMIN SERPL-MCNC: 3.2 G/DL (ref 3.5–5)
ALBUMIN/GLOB SERPL: 0.7 {RATIO} (ref 1.1–2.2)
ALP SERPL-CCNC: 69 U/L (ref 45–117)
ALT SERPL-CCNC: 23 U/L (ref 12–78)
AMYLASE SERPL-CCNC: 325 U/L (ref 25–115)
ANION GAP SERPL CALC-SCNC: 20 MMOL/L (ref 5–15)
AST SERPL-CCNC: 27 U/L (ref 15–37)
ATRIAL RATE: 107 BPM
ATRIAL RATE: 90 BPM
ATRIAL RATE: 93 BPM
BASOPHILS # BLD: 0 K/UL (ref 0–0.1)
BASOPHILS NFR BLD: 1 % (ref 0–1)
BILIRUB SERPL-MCNC: 0.5 MG/DL (ref 0.2–1)
BUN SERPL-MCNC: 102 MG/DL (ref 6–20)
BUN/CREAT SERPL: 10 (ref 12–20)
CALCIUM SERPL-MCNC: 8.3 MG/DL (ref 8.5–10.1)
CALCULATED P AXIS, ECG09: 23 DEGREES
CALCULATED P AXIS, ECG09: 33 DEGREES
CALCULATED P AXIS, ECG09: 34 DEGREES
CALCULATED R AXIS, ECG10: 41 DEGREES
CALCULATED R AXIS, ECG10: 47 DEGREES
CALCULATED R AXIS, ECG10: 54 DEGREES
CALCULATED T AXIS, ECG11: -119 DEGREES
CALCULATED T AXIS, ECG11: -93 DEGREES
CALCULATED T AXIS, ECG11: 16 DEGREES
CHLORIDE SERPL-SCNC: 97 MMOL/L (ref 97–108)
CHOLEST SERPL-MCNC: 140 MG/DL
CK MB CFR SERPL CALC: 3.5 % (ref 0–2.5)
CK MB CFR SERPL CALC: 4.5 % (ref 0–2.5)
CK MB SERPL-MCNC: 1.7 NG/ML (ref 5–25)
CK MB SERPL-MCNC: 1.9 NG/ML (ref 5–25)
CK SERPL-CCNC: 42 U/L (ref 26–192)
CK SERPL-CCNC: 48 U/L (ref 26–192)
CO2 SERPL-SCNC: 19 MMOL/L (ref 21–32)
CREAT SERPL-MCNC: 10.3 MG/DL (ref 0.55–1.02)
DIAGNOSIS, 93000: NORMAL
DIFFERENTIAL METHOD BLD: ABNORMAL
EOSINOPHIL # BLD: 0.1 K/UL (ref 0–0.4)
EOSINOPHIL NFR BLD: 3 % (ref 0–7)
ERYTHROCYTE [DISTWIDTH] IN BLOOD BY AUTOMATED COUNT: 15.7 % (ref 11.5–14.5)
GLOBULIN SER CALC-MCNC: 4.7 G/DL (ref 2–4)
GLUCOSE SERPL-MCNC: 91 MG/DL (ref 65–100)
HCT VFR BLD AUTO: 35.5 % (ref 35–47)
HDLC SERPL-MCNC: 43 MG/DL
HDLC SERPL: 3.3 {RATIO} (ref 0–5)
HGB BLD-MCNC: 10.9 G/DL (ref 11.5–16)
IMM GRANULOCYTES # BLD: 0 K/UL (ref 0–0.04)
IMM GRANULOCYTES NFR BLD AUTO: 0 % (ref 0–0.5)
LDLC SERPL CALC-MCNC: 60.4 MG/DL (ref 0–100)
LIPASE SERPL-CCNC: 376 U/L (ref 73–393)
LIPID PROFILE,FLP: ABNORMAL
LYMPHOCYTES # BLD: 0.7 K/UL (ref 0.8–3.5)
LYMPHOCYTES NFR BLD: 32 % (ref 12–49)
MAGNESIUM SERPL-MCNC: 2.1 MG/DL (ref 1.6–2.4)
MCH RBC QN AUTO: 28.2 PG (ref 26–34)
MCHC RBC AUTO-ENTMCNC: 30.7 G/DL (ref 30–36.5)
MCV RBC AUTO: 91.7 FL (ref 80–99)
MONOCYTES # BLD: 0.2 K/UL (ref 0–1)
MONOCYTES NFR BLD: 7 % (ref 5–13)
NEUTS SEG # BLD: 1.2 K/UL (ref 1.8–8)
NEUTS SEG NFR BLD: 57 % (ref 32–75)
NRBC # BLD: 0 K/UL (ref 0–0.01)
NRBC BLD-RTO: 0 PER 100 WBC
P-R INTERVAL, ECG05: 128 MS
P-R INTERVAL, ECG05: 140 MS
P-R INTERVAL, ECG05: 140 MS
PHOSPHATE SERPL-MCNC: 8.2 MG/DL (ref 2.6–4.7)
PLATELET # BLD AUTO: 169 K/UL (ref 150–400)
PMV BLD AUTO: 11.1 FL (ref 8.9–12.9)
POTASSIUM SERPL-SCNC: 5.3 MMOL/L (ref 3.5–5.1)
PROT SERPL-MCNC: 7.9 G/DL (ref 6.4–8.2)
Q-T INTERVAL, ECG07: 366 MS
Q-T INTERVAL, ECG07: 384 MS
Q-T INTERVAL, ECG07: 396 MS
QRS DURATION, ECG06: 76 MS
QRS DURATION, ECG06: 80 MS
QRS DURATION, ECG06: 84 MS
QTC CALCULATION (BEZET), ECG08: 469 MS
QTC CALCULATION (BEZET), ECG08: 488 MS
QTC CALCULATION (BEZET), ECG08: 492 MS
RBC # BLD AUTO: 3.87 M/UL (ref 3.8–5.2)
SODIUM SERPL-SCNC: 136 MMOL/L (ref 136–145)
TRIGL SERPL-MCNC: 183 MG/DL (ref ?–150)
TROPONIN I SERPL-MCNC: 0.26 NG/ML
TROPONIN I SERPL-MCNC: 0.31 NG/ML
TSH SERPL DL<=0.05 MIU/L-ACNC: 1.7 UIU/ML (ref 0.36–3.74)
VENTRICULAR RATE, ECG03: 107 BPM
VENTRICULAR RATE, ECG03: 90 BPM
VENTRICULAR RATE, ECG03: 93 BPM
VLDLC SERPL CALC-MCNC: 36.6 MG/DL
WBC # BLD AUTO: 2.2 K/UL (ref 3.6–11)

## 2018-09-11 PROCEDURE — 82150 ASSAY OF AMYLASE: CPT | Performed by: INTERNAL MEDICINE

## 2018-09-11 PROCEDURE — 74011636637 HC RX REV CODE- 636/637: Performed by: INTERNAL MEDICINE

## 2018-09-11 PROCEDURE — 93005 ELECTROCARDIOGRAM TRACING: CPT

## 2018-09-11 PROCEDURE — 74011000250 HC RX REV CODE- 250: Performed by: INTERNAL MEDICINE

## 2018-09-11 PROCEDURE — 74011250636 HC RX REV CODE- 250/636: Performed by: INTERNAL MEDICINE

## 2018-09-11 PROCEDURE — 84443 ASSAY THYROID STIM HORMONE: CPT | Performed by: INTERNAL MEDICINE

## 2018-09-11 PROCEDURE — 36415 COLL VENOUS BLD VENIPUNCTURE: CPT | Performed by: INTERNAL MEDICINE

## 2018-09-11 PROCEDURE — 65660000000 HC RM CCU STEPDOWN

## 2018-09-11 PROCEDURE — 84100 ASSAY OF PHOSPHORUS: CPT | Performed by: INTERNAL MEDICINE

## 2018-09-11 PROCEDURE — 74011250637 HC RX REV CODE- 250/637: Performed by: INTERNAL MEDICINE

## 2018-09-11 PROCEDURE — 74011250637 HC RX REV CODE- 250/637: Performed by: SPECIALIST

## 2018-09-11 PROCEDURE — 85025 COMPLETE CBC W/AUTO DIFF WBC: CPT | Performed by: INTERNAL MEDICINE

## 2018-09-11 PROCEDURE — 82553 CREATINE MB FRACTION: CPT | Performed by: INTERNAL MEDICINE

## 2018-09-11 PROCEDURE — 83690 ASSAY OF LIPASE: CPT | Performed by: INTERNAL MEDICINE

## 2018-09-11 PROCEDURE — 84484 ASSAY OF TROPONIN QUANT: CPT | Performed by: INTERNAL MEDICINE

## 2018-09-11 PROCEDURE — 80061 LIPID PANEL: CPT | Performed by: INTERNAL MEDICINE

## 2018-09-11 PROCEDURE — 90935 HEMODIALYSIS ONE EVALUATION: CPT

## 2018-09-11 PROCEDURE — 5A1D70Z PERFORMANCE OF URINARY FILTRATION, INTERMITTENT, LESS THAN 6 HOURS PER DAY: ICD-10-PCS | Performed by: HOSPITALIST

## 2018-09-11 PROCEDURE — 80053 COMPREHEN METABOLIC PANEL: CPT | Performed by: INTERNAL MEDICINE

## 2018-09-11 PROCEDURE — 83735 ASSAY OF MAGNESIUM: CPT | Performed by: INTERNAL MEDICINE

## 2018-09-11 RX ORDER — HYDROCODONE BITARTRATE AND ACETAMINOPHEN 7.5; 325 MG/1; MG/1
1 TABLET ORAL
Status: DISCONTINUED | OUTPATIENT
Start: 2018-09-11 | End: 2018-09-13

## 2018-09-11 RX ORDER — FENTANYL CITRATE 50 UG/ML
50 INJECTION, SOLUTION INTRAMUSCULAR; INTRAVENOUS
Status: DISCONTINUED | OUTPATIENT
Start: 2018-09-11 | End: 2018-09-13

## 2018-09-11 RX ORDER — LANOLIN ALCOHOL/MO/W.PET/CERES
325 CREAM (GRAM) TOPICAL
Status: DISCONTINUED | OUTPATIENT
Start: 2018-09-12 | End: 2018-09-12

## 2018-09-11 RX ADMIN — FENTANYL CITRATE 50 MCG: 50 INJECTION, SOLUTION INTRAMUSCULAR; INTRAVENOUS at 19:44

## 2018-09-11 RX ADMIN — HYDROXYCHLOROQUINE SULFATE 200 MG: 200 TABLET, FILM COATED ORAL at 18:20

## 2018-09-11 RX ADMIN — FENTANYL CITRATE 50 MCG: 50 INJECTION, SOLUTION INTRAMUSCULAR; INTRAVENOUS at 13:22

## 2018-09-11 RX ADMIN — PREDNISONE 10 MG: 5 TABLET ORAL at 09:14

## 2018-09-11 RX ADMIN — ONDANSETRON 4 MG: 2 INJECTION INTRAMUSCULAR; INTRAVENOUS at 19:44

## 2018-09-11 RX ADMIN — FERROUS SULFATE TAB 325 MG (65 MG ELEMENTAL FE) 325 MG: 325 (65 FE) TAB at 13:15

## 2018-09-11 RX ADMIN — FERROUS SULFATE TAB 325 MG (65 MG ELEMENTAL FE) 325 MG: 325 (65 FE) TAB at 18:20

## 2018-09-11 RX ADMIN — ASPIRIN 81 MG CHEWABLE TABLET 81 MG: 81 TABLET CHEWABLE at 09:14

## 2018-09-11 RX ADMIN — SODIUM CHLORIDE 2.5 MG/HR: 900 INJECTION, SOLUTION INTRAVENOUS at 20:50

## 2018-09-11 RX ADMIN — METOPROLOL TARTRATE 25 MG: 25 TABLET ORAL at 06:19

## 2018-09-11 RX ADMIN — METOPROLOL TARTRATE 25 MG: 25 TABLET ORAL at 18:20

## 2018-09-11 RX ADMIN — HYDROCODONE BITARTRATE AND ACETAMINOPHEN 1 TABLET: 7.5; 325 TABLET ORAL at 20:53

## 2018-09-11 RX ADMIN — Medication 10 ML: at 13:16

## 2018-09-11 RX ADMIN — Medication 10 ML: at 00:04

## 2018-09-11 RX ADMIN — SODIUM CHLORIDE 5 MG/HR: 900 INJECTION, SOLUTION INTRAVENOUS at 00:09

## 2018-09-11 RX ADMIN — HYDROXYCHLOROQUINE SULFATE 200 MG: 200 TABLET, FILM COATED ORAL at 09:14

## 2018-09-11 RX ADMIN — AMITRIPTYLINE HYDROCHLORIDE 25 MG: 50 TABLET, FILM COATED ORAL at 23:13

## 2018-09-11 RX ADMIN — HYDROCODONE BITARTRATE AND ACETAMINOPHEN 1 TABLET: 5; 325 TABLET ORAL at 00:02

## 2018-09-11 RX ADMIN — AMITRIPTYLINE HYDROCHLORIDE 25 MG: 50 TABLET, FILM COATED ORAL at 00:09

## 2018-09-11 RX ADMIN — METOPROLOL TARTRATE 25 MG: 25 TABLET ORAL at 00:02

## 2018-09-11 RX ADMIN — SODIUM POLYSTYRENE SULFONATE 15 G: 15 SUSPENSION ORAL; RECTAL at 00:04

## 2018-09-11 RX ADMIN — APIXABAN 5 MG: 5 TABLET, FILM COATED ORAL at 09:14

## 2018-09-11 RX ADMIN — METOPROLOL TARTRATE 25 MG: 25 TABLET ORAL at 23:13

## 2018-09-11 RX ADMIN — GEMFIBROZIL 300 MG: 600 TABLET ORAL at 09:13

## 2018-09-11 RX ADMIN — SODIUM CHLORIDE 2.5 MG/HR: 900 INJECTION, SOLUTION INTRAVENOUS at 06:21

## 2018-09-11 RX ADMIN — HEPARIN SODIUM 5000 UNITS: 5000 INJECTION INTRAVENOUS; SUBCUTANEOUS at 00:06

## 2018-09-11 RX ADMIN — APIXABAN 5 MG: 5 TABLET, FILM COATED ORAL at 18:20

## 2018-09-11 RX ADMIN — FERROUS SULFATE TAB 325 MG (65 MG ELEMENTAL FE) 325 MG: 325 (65 FE) TAB at 09:14

## 2018-09-11 RX ADMIN — METOPROLOL TARTRATE 25 MG: 25 TABLET ORAL at 13:15

## 2018-09-11 RX ADMIN — PANTOPRAZOLE SODIUM 40 MG: 40 TABLET, DELAYED RELEASE ORAL at 06:19

## 2018-09-11 RX ADMIN — AZATHIOPRINE 50 MG: 50 TABLET ORAL at 09:13

## 2018-09-11 NOTE — PROGRESS NOTES
Cardiology Progress Note            Admit Date: 9/10/2018  Admit Diagnosis: Malignant hypertensive urgency  Date: 9/11/2018     Time: 11:10 AM    Subjective:  Denies CP, but has all over body pain. No SOB     Assessment and Plan     1. Elevated troponin - Flat   - 0.31 --> 0.26   - EKG without evidence for ACS   - Non specific in this setting of ESRD (Cr 10), Significant HTN (205) and SLE flair   - Echo ordered  2. HTN   - Improved this am   - Meme Jarrett gtt  3. ESRD   - Cr 10   - Nephrology consulted for HD  4. SLE   - Acute exacerbation   - Prednisone, Plaquenil    BP improved on current therapy. Cr elevated and HD needed. Nephrology consulted. Troponin low and flat, suspect demand and NOT NSTEMI, related to ESRD, significant HTN and SLE flair. Echo ordered.   Will follow up results     ROS:     GENERAL   Recent weight loss - no   Fever -----------------   no   Chills -----------------   no     EYES, VISION   Visual Changes - no     EARS, NOSE, THROAT   Hearing loss ----------- no   Swallowing difficulties - no     CARDIOVASCULAR   Chest pain/pressure ---- no   Arrhythmia/palpitations - no       RESPIRATORY   Cough ------------------ no   Shortness of breath - no   Wheezing -------------- no   GASTROINTESTINAL   Abdominal pain - no   Heartburn -------- no   Bloody stool ----- no     GENITOURINARY   Frequent urination - no   Urgency -------------- no     MUSCULOSKELETAL   Joint pain/swelling ---- no   Musculoskeletal pain - no     SKIN & INTEGUMENTARY   Rashes - no   Sores --- no         NEUROLOGICAL   Numbness/tingling - no   Sensation loss ------ no     PSYCHIATRIC   Nervousness/anxiety - no   Depression -------------- no     ENDOCRINE   Heat/cold intolerance - no   Excessive thirst -------- no     HEMATOLOGIC/LYMPHATIC   Abnormal bleeding - no     ALL/IMMUN   Allergic reaction ------ no   Recurrent infections - no Objective:     Physical Exam:                Visit Vitals    /88 (BP 1 Location: Left arm, BP Patient Position: At rest)    Pulse 96    Temp 97.9 °F (36.6 °C)    Resp 20    Ht 5' 5\" (1.651 m)    Wt 138 lb 3.7 oz (62.7 kg)    SpO2 98%    BMI 23 kg/m2        General Appearance:   Well developed, well nourished,alert and oriented x 3, and   individual in no acute distress, but looks in pain. Ears/Nose/Mouth/Throat:    Hearing grossly normal.         Neck:  Supple. Chest:    Lungs clear to auscultation bilaterally. Cardiovascular:   Regular rate and rhythm, S1, S2 normal, no murmur. Abdomen:    Soft, non-tender, bowel sounds are active. Extremities:  No edema bilaterally. Skin:  Warm and dry.      Telemetry: normal sinus rhythm          Data Review:    Labs:    Recent Results (from the past 24 hour(s))   EKG, 12 LEAD, INITIAL    Collection Time: 09/10/18  5:39 PM   Result Value Ref Range    Ventricular Rate 107 BPM    Atrial Rate 107 BPM    P-R Interval 128 ms    QRS Duration 76 ms    Q-T Interval 366 ms    QTC Calculation (Bezet) 488 ms    Calculated P Axis 34 degrees    Calculated R Axis 41 degrees    Calculated T Axis 16 degrees    Diagnosis       Sinus tachycardia  Nonspecific T wave abnormality  When compared with ECG of 15-AUG-2018 18:59,  Nonspecific T wave abnormality, worse in Lateral leads  Confirmed by LYNETTE Sutton, [facility] ([id number]) on 9/11/2018 9:53:24 AM     CBC WITH AUTOMATED DIFF    Collection Time: 09/10/18  5:52 PM   Result Value Ref Range    WBC 2.9 (L) 3.6 - 11.0 K/uL    RBC 3.94 3.80 - 5.20 M/uL    HGB 11.2 (L) 11.5 - 16.0 g/dL    HCT 36.8 35.0 - 47.0 %    MCV 93.4 80.0 - 99.0 FL    MCH 28.4 26.0 - 34.0 PG    MCHC 30.4 30.0 - 36.5 g/dL    RDW 15.6 (H) 11.5 - 14.5 %    PLATELET 543 678 - 266 K/uL    MPV 10.8 8.9 - 12.9 FL    NRBC 0.0 0  WBC    ABSOLUTE NRBC 0.00 0.00 - 0.01 K/uL    NEUTROPHILS 74 32 - 75 %    LYMPHOCYTES 19 12 - 49 %    MONOCYTES 5 5 - 13 % EOSINOPHILS 1 0 - 7 %    BASOPHILS 1 0 - 1 %    IMMATURE GRANULOCYTES 0 0.0 - 0.5 %    ABS. NEUTROPHILS 2.2 1.8 - 8.0 K/UL    ABS. LYMPHOCYTES 0.6 (L) 0.8 - 3.5 K/UL    ABS. MONOCYTES 0.1 0.0 - 1.0 K/UL    ABS. EOSINOPHILS 0.0 0.0 - 0.4 K/UL    ABS. BASOPHILS 0.0 0.0 - 0.1 K/UL    ABS. IMM. GRANS. 0.0 0.00 - 0.04 K/UL    DF SMEAR SCANNED      RBC COMMENTS ANISOCYTOSIS  1+       METABOLIC PANEL, COMPREHENSIVE    Collection Time: 09/10/18  5:52 PM   Result Value Ref Range    Sodium 136 136 - 145 mmol/L    Potassium 5.6 (H) 3.5 - 5.1 mmol/L    Chloride 98 97 - 108 mmol/L    CO2 24 21 - 32 mmol/L    Anion gap 14 5 - 15 mmol/L    Glucose 89 65 - 100 mg/dL     (H) 6 - 20 MG/DL    Creatinine 9.71 (H) 0.55 - 1.02 MG/DL    BUN/Creatinine ratio 10 (L) 12 - 20      GFR est AA 6 (L) >60 ml/min/1.73m2    GFR est non-AA 5 (L) >60 ml/min/1.73m2    Calcium 8.4 (L) 8.5 - 10.1 MG/DL    Bilirubin, total 0.4 0.2 - 1.0 MG/DL    ALT (SGPT) 23 12 - 78 U/L    AST (SGOT) 34 15 - 37 U/L    Alk. phosphatase 77 45 - 117 U/L    Protein, total 8.7 (H) 6.4 - 8.2 g/dL    Albumin 3.6 3.5 - 5.0 g/dL    Globulin 5.1 (H) 2.0 - 4.0 g/dL    A-G Ratio 0.7 (L) 1.1 - 2.2     SAMPLES BEING HELD    Collection Time: 09/10/18  5:52 PM   Result Value Ref Range    SAMPLES BEING HELD 1RED 1BLU     COMMENT        Add-on orders for these samples will be processed based on acceptable specimen integrity and analyte stability, which may vary by analyte.    TROPONIN I    Collection Time: 09/10/18  5:52 PM   Result Value Ref Range    Troponin-I, Qt. 0.36 (H) <0.05 ng/mL   CK W/ CKMB & INDEX    Collection Time: 09/11/18 12:16 AM   Result Value Ref Range    CK 48 26 - 192 U/L    CK - MB 1.7 <3.6 NG/ML    CK-MB Index 3.5 (H) 0 - 2.5     TROPONIN I    Collection Time: 09/11/18 12:16 AM   Result Value Ref Range    Troponin-I, Qt. 0.31 (H) <0.05 ng/mL   EKG, 12 LEAD, INITIAL    Collection Time: 09/11/18  1:40 AM   Result Value Ref Range    Ventricular Rate 93 BPM Atrial Rate 93 BPM    P-R Interval 140 ms    QRS Duration 84 ms    Q-T Interval 396 ms    QTC Calculation (Bezet) 492 ms    Calculated P Axis 23 degrees    Calculated R Axis 47 degrees    Calculated T Axis -93 degrees    Diagnosis       Normal sinus rhythm  Possible Left atrial enlargement  T wave abnormality, consider inferolateral ischemia  Prolonged QT  When compared with ECG of 10-SEP-2018 17:39,  Inverted T waves have replaced nonspecific T wave abnormality in Lateral   leads  Confirmed by Anish Tapia M.D., [facility] ([id number]) on 9/11/2018 10:31:44 AM     EKG, 12 LEAD, INITIAL    Collection Time: 09/11/18  5:37 AM   Result Value Ref Range    Ventricular Rate 90 BPM    Atrial Rate 90 BPM    P-R Interval 140 ms    QRS Duration 80 ms    Q-T Interval 384 ms    QTC Calculation (Bezet) 469 ms    Calculated P Axis 33 degrees    Calculated R Axis 54 degrees    Calculated T Axis -119 degrees    Diagnosis       Normal sinus rhythm  Possible Left atrial enlargement  T wave abnormality, consider inferolateral ischemia  Prolonged QT  When compared with ECG of 11-SEP-2018 01:40,  No significant change was found  Confirmed by Anish Tapia M.D., [facility] Master ([id number]) on 9/11/2018 06:41:24 AM     METABOLIC PANEL, COMPREHENSIVE    Collection Time: 09/11/18  6:17 AM   Result Value Ref Range    Sodium 136 136 - 145 mmol/L    Potassium 5.3 (H) 3.5 - 5.1 mmol/L    Chloride 97 97 - 108 mmol/L    CO2 19 (L) 21 - 32 mmol/L    Anion gap 20 (H) 5 - 15 mmol/L    Glucose 91 65 - 100 mg/dL     (H) 6 - 20 MG/DL    Creatinine 10.30 (H) 0.55 - 1.02 MG/DL    BUN/Creatinine ratio 10 (L) 12 - 20      GFR est AA 5 (L) >60 ml/min/1.73m2    GFR est non-AA 4 (L) >60 ml/min/1.73m2    Calcium 8.3 (L) 8.5 - 10.1 MG/DL    Bilirubin, total 0.5 0.2 - 1.0 MG/DL    ALT (SGPT) 23 12 - 78 U/L    AST (SGOT) 27 15 - 37 U/L    Alk.  phosphatase 69 45 - 117 U/L    Protein, total 7.9 6.4 - 8.2 g/dL    Albumin 3.2 (L) 3.5 - 5.0 g/dL    Globulin 4.7 (H) 2.0 - 4.0 g/dL    A-G Ratio 0.7 (L) 1.1 - 2.2     LIPID PANEL    Collection Time: 09/11/18  6:17 AM   Result Value Ref Range    LIPID PROFILE          Cholesterol, total 140 <200 MG/DL    Triglyceride 183 (H) <150 MG/DL    HDL Cholesterol 43 MG/DL    LDL, calculated 60.4 0 - 100 MG/DL    VLDL, calculated 36.6 MG/DL    CHOL/HDL Ratio 3.3 0 - 5.0     CBC WITH AUTOMATED DIFF    Collection Time: 09/11/18  6:17 AM   Result Value Ref Range    WBC 2.2 (L) 3.6 - 11.0 K/uL    RBC 3.87 3.80 - 5.20 M/uL    HGB 10.9 (L) 11.5 - 16.0 g/dL    HCT 35.5 35.0 - 47.0 %    MCV 91.7 80.0 - 99.0 FL    MCH 28.2 26.0 - 34.0 PG    MCHC 30.7 30.0 - 36.5 g/dL    RDW 15.7 (H) 11.5 - 14.5 %    PLATELET 534 852 - 158 K/uL    MPV 11.1 8.9 - 12.9 FL    NRBC 0.0 0  WBC    ABSOLUTE NRBC 0.00 0.00 - 0.01 K/uL    NEUTROPHILS 57 32 - 75 %    LYMPHOCYTES 32 12 - 49 %    MONOCYTES 7 5 - 13 %    EOSINOPHILS 3 0 - 7 %    BASOPHILS 1 0 - 1 %    IMMATURE GRANULOCYTES 0 0.0 - 0.5 %    ABS. NEUTROPHILS 1.2 (L) 1.8 - 8.0 K/UL    ABS. LYMPHOCYTES 0.7 (L) 0.8 - 3.5 K/UL    ABS. MONOCYTES 0.2 0.0 - 1.0 K/UL    ABS. EOSINOPHILS 0.1 0.0 - 0.4 K/UL    ABS. BASOPHILS 0.0 0.0 - 0.1 K/UL    ABS. IMM.  GRANS. 0.0 0.00 - 0.04 K/UL    DF AUTOMATED     MAGNESIUM    Collection Time: 09/11/18  6:17 AM   Result Value Ref Range    Magnesium 2.1 1.6 - 2.4 mg/dL   PHOSPHORUS    Collection Time: 09/11/18  6:17 AM   Result Value Ref Range    Phosphorus 8.2 (H) 2.6 - 4.7 MG/DL   CK W/ CKMB & INDEX    Collection Time: 09/11/18  6:17 AM   Result Value Ref Range    CK 42 26 - 192 U/L    CK - MB 1.9 <3.6 NG/ML    CK-MB Index 4.5 (H) 0 - 2.5     TSH 3RD GENERATION    Collection Time: 09/11/18  6:17 AM   Result Value Ref Range    TSH 1.70 0.36 - 3.74 uIU/mL   AMYLASE    Collection Time: 09/11/18  6:17 AM   Result Value Ref Range    Amylase 325 (H) 25 - 115 U/L   LIPASE    Collection Time: 09/11/18  6:17 AM   Result Value Ref Range    Lipase 376 73 - 393 U/L   TROPONIN I    Collection Time: 09/11/18  6:17 AM Result Value Ref Range    Troponin-I, Qt. 0.26 (H) <0.05 ng/mL          Radiology:        Current Facility-Administered Medications   Medication Dose Route Frequency    acetaminophen (TYLENOL) tablet 650 mg  650 mg Oral Q4H PRN    amitriptyline (ELAVIL) tablet 25 mg  25 mg Oral QHS    apixaban (ELIQUIS) tablet 5 mg  5 mg Oral BID    azaTHIOprine (IMURAN) tablet 50 mg  50 mg Oral DAILY AFTER BREAKFAST    ferrous sulfate tablet 325 mg  325 mg Oral TID WITH MEALS    gemfibrozil (LOPID) tablet 300 mg  300 mg Oral DAILY    hydroxychloroquine (PLAQUENIL) tablet 200 mg  200 mg Oral BID    pantoprazole (PROTONIX) tablet 40 mg  40 mg Oral ACB    polyethylene glycol (MIRALAX) packet 17 g  17 g Oral DAILY PRN    predniSONE (DELTASONE) tablet 10 mg  10 mg Oral DAILY    senna (SENOKOT) tablet 8.6 mg  1 Tab Oral DAILY PRN    sodium chloride (NS) flush 5-10 mL  5-10 mL IntraVENous Q8H    sodium chloride (NS) flush 5-10 mL  5-10 mL IntraVENous PRN    HYDROcodone-acetaminophen (NORCO) 5-325 mg per tablet 1 Tab  1 Tab Oral Q4H PRN    fentaNYL citrate (PF) injection 25 mcg  25 mcg IntraVENous Q4H PRN    ondansetron (ZOFRAN) injection 4 mg  4 mg IntraVENous Q4H PRN    albuterol-ipratropium (DUO-NEB) 2.5 MG-0.5 MG/3 ML  3 mL Nebulization Q4H PRN    aspirin chewable tablet 81 mg  81 mg Oral DAILY    niCARdipine (CARDENE) 25 mg in 0.9% sodium chloride 250 mL infusion  0-15 mg/hr IntraVENous TITRATE    metoprolol tartrate (LOPRESSOR) tablet 25 mg  25 mg Oral Q6H       Esteban Samson M.D. Patient seen on  Day of note  and examined  and agree with Advance Practice Provider (ELTON, NP,PA)  assessment and plans.   She had no pain Tuesday am and echo was pending  Her trop coming down Meri Garrett MD 9/12/2018        [facility] [address] [place] [address], [address] [place] [address] [address]   Jerzy Ivy   [phone]

## 2018-09-11 NOTE — PROGRESS NOTES
Hospitalist Progress Note  Aylin Collins MD  Answering service: [phone] OR [phone] from in house phone      Date of Service:  2018  NAME:  Iona Nicole  :  1985  MRN:  [id number]      Admission Summary:   30 yo woman with dyslipidemia, h/o pulmonary embolism, ESRD on TTS HD, chronic pain syndrome, asthma, systemic lupus erythematosus presented to the ED from home on 9/10/18 with progressive fatigue, generalized body aches, abdominal pain, chest pain. She was admitted to [facility] with hypertensive urgency. Interval history / Subjective:   C/o all over pain not relieved by current pain regimen; d/w nurse     Assessment & Plan:     Hypertensive urgency (POA) - still on nicardipine gtt; keep in IMCU  - CT head wo contrast 9/10 unremarkable  rule out acute pathology  - pt reports very difficult to control BP despite medication adherence; will need adjustments to her home medication at the time of discharge  - continue metoprolol    Elevated troponin with CP (POA) - nonspecific with ESRD and ECG without evidence of ACS  - Cards following  - TTE pending  - continue BB    Dyslipidemia - LDL 60 but ; resumed home gemfibrozil    Chronic PE - continue apixaban    ESRD on HD - TTS HD as per Renal    Hyperkalemia - likely due to ESRD; kayexalate prn    Leukopenia (POA) - patient is asymptomatic     Chronic pain syndrome and chronic back pain - pain control    SLE with exacerbation (POA) - continue home Plaquenil, prednisone, azathioprine  - pain control    Asthma - controlled, nebs prn    Code status: full  DVT prophylaxis: SCDs    Care Plan discussed with: Patient/Family and Nurse  Disposition: TBD     Hospital Problems  Date Reviewed: 9/10/2018          Codes Class Noted POA    * (Principal)Malignant hypertensive urgency ICD-10-CM: I16.0  ICD-9-CM: 401.0  9/10/2018 Yes                Review of Systems:   Pertinent items are noted in HPI. Vital Signs:    Last 24hrs VS reviewed since prior progress note. Most recent are:  Visit Vitals    /88 (BP 1 Location: Left arm, BP Patient Position: At rest)    Pulse 96    Temp 97.9 °F (36.6 °C)    Resp 20    Ht 5' 5\" (1.651 m)    Wt 62.7 kg (138 lb 3.7 oz)    SpO2 98%    BMI 23 kg/m2         Intake/Output Summary (Last 24 hours) at 09/11/18 1145  Last data filed at 09/11/18 0140   Gross per 24 hour   Intake            60.42 ml   Output                0 ml   Net            60.42 ml        Physical Examination:     Constitutional:  awake, no acute distress, cooperative, pleasant    ENT:  oral mucosa moist, oropharynx benign    Resp:  diminished BS   CV:  regular rhythm, normal rate, no m/r/g appreciated, RUE and b/l LE edema, RUE AVG +thrill/bruit    GI:  +BS, soft, non distended, NT    Musculoskeletal:  HENDRICKSON    Neurologic:  AAOx3, NFD                                             Skin:  warm, dry; multiple healed abdominal surgical scars      Data Review:    Review and/or order of clinical lab test  Review and/or order of tests in the radiology section of CPT  Review and/or order of tests in the medicine section of CPT      Labs:     Recent Labs      09/11/18 0617  09/10/18   1752   WBC  2.2*  2.9*   HGB  10.9*  11.2*   HCT  35.5  36.8   PLT  169  184     Recent Labs      09/11/18   0617  09/10/18   1752   NA  136  136   K  5.3*  5.6*   CL  97  98   CO2  19*  24   BUN  102*  100*   CREA  10.30*  9.71*   GLU  91  89   CA  8.3*  8.4*   MG  2.1   --    PHOS  8.2*   --      Recent Labs      09/11/18   0617  09/10/18   1752   SGOT  27  34   ALT  23  23   AP  69  77   TBILI  0.5  0.4   TP  7.9  8.7*   ALB  3.2*  3.6   GLOB  4.7*  5.1*   AML  325*   --    LPSE  376   --      No results for input(s): INR, PTP, APTT in the last 72 hours. No lab exists for component: INREXT   No results for input(s): FE, TIBC, PSAT, FERR in the last 72 hours.    Lab Results   Component Value Date/Time    Folate 13.7 06/17/2018 03:00 PM      No results for input(s): PH, PCO2, PO2 in the last 72 hours.   Recent Labs      09/11/18   0617  09/11/18   0016  09/10/18   1752   CPK  42  48   --    CKNDX  4.5*  3.5*   --    TROIQ  0.26*  0.31*  0.36*     Lab Results   Component Value Date/Time    Cholesterol, total 140 09/11/2018 06:17 AM    HDL Cholesterol 43 09/11/2018 06:17 AM    LDL, calculated 60.4 09/11/2018 06:17 AM    Triglyceride 183 (H) 09/11/2018 06:17 AM    CHOL/HDL Ratio 3.3 09/11/2018 06:17 AM     Lab Results   Component Value Date/Time    Glucose (POC) 104 (H) 08/16/2018 11:06 AM    Glucose (POC) 164 (H) 07/22/2018 11:43 AM    Glucose (POC) 138 (H) 07/21/2018 09:22 PM    Glucose (POC) 111 (H) 07/21/2018 04:57 PM    Glucose (POC) 105 (H) 07/21/2018 11:38 AM     Lab Results   Component Value Date/Time    Color YELLOW/STRAW 08/12/2016 09:06 PM    Appearance CLEAR 08/12/2016 09:06 PM    Specific gravity 1.017 08/12/2016 09:06 PM    Specific gravity 1.010 07/11/2016 12:44 PM    pH (UA) 7.0 08/12/2016 09:06 PM    Protein 300 (A) 08/12/2016 09:06 PM    Glucose NEGATIVE  08/12/2016 09:06 PM    Ketone TRACE (A) 08/12/2016 09:06 PM    Bilirubin NEGATIVE  07/11/2016 12:44 PM    Urobilinogen 1.0 08/12/2016 09:06 PM    Nitrites NEGATIVE  08/12/2016 09:06 PM    Leukocyte Esterase NEGATIVE  08/12/2016 09:06 PM    Epithelial cells MODERATE (A) 08/12/2016 09:06 PM    Bacteria 1+ (A) 08/12/2016 09:06 PM    WBC 0-4 08/12/2016 09:06 PM    RBC 0-5 08/12/2016 09:06 PM         Medications Reviewed:     Current Facility-Administered Medications   Medication Dose Route Frequency    acetaminophen (TYLENOL) tablet 650 mg  650 mg Oral Q4H PRN    amitriptyline (ELAVIL) tablet 25 mg  25 mg Oral QHS    apixaban (ELIQUIS) tablet 5 mg  5 mg Oral BID    azaTHIOprine (IMURAN) tablet 50 mg  50 mg Oral DAILY AFTER BREAKFAST    ferrous sulfate tablet 325 mg  325 mg Oral TID WITH MEALS    gemfibrozil (LOPID) tablet 300 mg  300 mg Oral DAILY    hydroxychloroquine (PLAQUENIL) tablet 200 mg  200 mg Oral BID    pantoprazole (PROTONIX) tablet 40 mg  40 mg Oral ACB    polyethylene glycol (MIRALAX) packet 17 g  17 g Oral DAILY PRN    predniSONE (DELTASONE) tablet 10 mg  10 mg Oral DAILY    senna (SENOKOT) tablet 8.6 mg  1 Tab Oral DAILY PRN    sodium chloride (NS) flush 5-10 mL  5-10 mL IntraVENous Q8H    sodium chloride (NS) flush 5-10 mL  5-10 mL IntraVENous PRN    HYDROcodone-acetaminophen (NORCO) 5-325 mg per tablet 1 Tab  1 Tab Oral Q4H PRN    fentaNYL citrate (PF) injection 25 mcg  25 mcg IntraVENous Q4H PRN    ondansetron (ZOFRAN) injection 4 mg  4 mg IntraVENous Q4H PRN    albuterol-ipratropium (DUO-NEB) 2.5 MG-0.5 MG/3 ML  3 mL Nebulization Q4H PRN    aspirin chewable tablet 81 mg  81 mg Oral DAILY    niCARdipine (CARDENE) 25 mg in 0.9% sodium chloride 250 mL infusion  0-15 mg/hr IntraVENous TITRATE    metoprolol tartrate (LOPRESSOR) tablet 25 mg  25 mg Oral Q6H     ______________________________________________________________________  EXPECTED LENGTH OF STAY: 2d 2h  ACTUAL LENGTH OF STAY:          1                 Rajinder Nichole MD

## 2018-09-11 NOTE — CONSULTS
Consultation Note    NAME: Tutu Dasilva   :  1985   MRN:  [id number]     Date/Time:  2018 11:38 AM    I have been asked to see this patient by Dr. Boby Short  for advice/opinion re: ESRD. Assessment :    Plan:  HQIF-EII-QRU-[place]  Anemia  CP Plan for HD today. Pull fluid as tolerated. 2K bath. Holding EPO       Subjective:   CHIEF COMPLAINT:  \"I had CP yesterday. \"    HISTORY OF PRESENT ILLNESS:     Kevon Tellez is a 29 y.o.   female who has a history of ESRD says that she went to HD Saturday and did fine. She says that she didn't feel quite right on . Yesterday she developed right sided chest pain. 8/10. No radiation. + nausea. Called EMS and is admitted. She says that she feels fine now. Past Medical History:   Diagnosis Date    Anemia     secondary to lupus    Asthma     no inhaler use in past 2 to 3 years    Carditis     Chronic kidney disease     ESRD    Chronic pain     DDD (degenerative disc disease), lumbar     ESRD (end stage renal disease) (HCC)     GERD (gastroesophageal reflux disease)     Hemod[facility] ([facility] 2017    [address]  Tuesday,  Thursday,  and Saturday.  Hypercholesterolemia     Hypertension     Intractable nausea and vomiting 10/21/2015    Long term (current) use of anticoagulants     Lupus (systemic lupus erythematosus) (HCC)     Malignant hypertension with chronic kidney disease stage V ([address]     Peritoneal dialysis status ([facility] 10/2015    x 2 years Stopped 2017 due to infection and removed.  Poor historian 2018    With medications    Thromboembolus ([facility] [address] [facility] 2013    lungs    Transfusion history     Last Transfusion 2017  at [facility]      Past Surgical History:   Procedure Laterality Date    HX  SECTION  11/2006    x1    HX OTHER SURGICAL  9/16/15    INSERTION PD CATH;  Removed 2017    HX VASCULAR ACCESS Right 2017    Double-Lumen henry catheter upper chest Social History   Substance Use Topics    Smoking status: Never Smoker    Smokeless tobacco: Never Used    Alcohol use No      Family History   Problem Relation Age of Onset    Diabetes Father     Hypertension Father     Cancer Other      aunt with breast cancer    Diabetes Mother       Allergies   Allergen Reactions    Compazine [Prochlorperazine Edisylate] Nausea and Vomiting and Palpitations      Prior to Admission medications    Medication Sig Start Date End Date Taking? Authorizing Provider   ferric citrate (AURYXIA) 210 mg iron tablet Take 210 mg by mouth three (3) times daily (with meals). Yes Historical Provider   losartan (COZAAR) 25 mg tablet Take 25 mg by mouth daily. Yes Historical Provider   cloNIDine (CATAPRES) 0.3 mg/24 hr 1 Patch by TransDERmal route every seven (7) days. Yes Historical Provider   oxyCODONE-acetaminophen (PERCOCET) 5-325 mg per tablet Take 1 tablet, two to three times a day, if needed for severe pain. 8/27/18  Yes Dick Smart MD   carvedilol (COREG) 25 mg tablet Take 1 Tab by mouth two (2) times daily (with meals). 8/19/18  Yes Janene Valenzuela MD   amLODIPine (NORVASC) 10 mg tablet Take 1 Tab by mouth daily. 7/22/18  Yes Syed Mederos MD   apixaban (ELIQUIS) 5 mg tablet Take 5 mg by mouth two (2) times a day. Indications: pulmonary thromboembolism   Yes Historical Provider   polyethylene glycol (MIRALAX) 17 gram packet Take 17 g by mouth daily as needed. Yes Historical Provider   predniSONE (DELTASONE) 5 mg tablet Take 2 Tabs by mouth daily. 6/28/18  Yes Karthik Garcia MD   senna (SENNA) 8.6 mg tablet Take 1 Tab by mouth daily as needed for Constipation. for constipation   Yes Historical Provider   amitriptyline (ELAVIL) 25 mg tablet Take 25 mg by mouth nightly. Yes Historical Provider   gemfibrozil (LOPID) 600 mg tablet Take 300 mg by mouth daily.  11/3/17  Yes Historical Provider   azaTHIOprine (IMURAN) 50 mg tablet Take 50 mg by mouth daily (after breakfast). 11/3/17  Yes Historical Provider   albuterol (PROVENTIL HFA, VENTOLIN HFA, PROAIR HFA) 90 mcg/actuation inhaler Take 1-2 Puffs by inhalation every four (4) hours as needed for Wheezing or Shortness of Breath. 10/30/17  Yes Colten Vega NP   hydroxychloroquine (PLAQUENIL) 200 mg tablet Take 200 mg by mouth two (2) times a day. Yes Darnell Franks MD   pantoprazole (PROTONIX) 40 mg tablet Take 1 Tab by mouth Daily (before breakfast).  10/23/15  Yes Usha Chow MD     REVIEW OF SYSTEMS:     []  Unable to obtain reliable ROS due to  [] mental status  [] sedated   [] intubated   [x] Total of 12 systems reviewed as follows:  Constitutional: negative fever, negative chills, negative weight loss  Eyes:   negative visual changes  ENT:   negative sore throat, tongue or lip swelling  Respiratory:  negative cough, negative dyspnea  Cards:  negative for chest pain, palpitations, lower extremity edema  GI:   negative for nausea, vomiting, diarrhea, and abdominal pain  :  negative for frequency, dysuria  Integument:  negative for rash and pruritus  Heme:  negative for easy bruising and gum/nose bleeding  Musculoskel: negative for myalgias,  back pain and muscle weakness  Neuro:  negative for headaches, dizziness, vertigo  Psych:  negative for feelings of anxiety, depression   Travel?: none    Objective:   VITALS:    Visit Vitals    /88 (BP 1 Location: Left arm, BP Patient Position: At rest)    Pulse 96    Temp 97.9 °F (36.6 °C)    Resp 20    Ht 5' 5\" (1.651 m)    Wt 62.7 kg (138 lb 3.7 oz)    SpO2 98%    BMI 23 kg/m2     PHYSICAL EXAM:  Gen:  [x]  WD [x]  WN  [] cachectic []  thin []  obese []  disheveled             []  ill apearing  []   Critical  []   Chronic    [x]  No acute distress    HEENT:   [x] NC/AT/PERRL    [x] pink conjunctivae      [] pale conjunctivae                  PERRL  [] yes  [] no      [] moist mucosa    [] dry mucosa    hearing intact to voice [] yes  [] No NECK:   supple [x] yes  [] no        masses [] yes  [] No               thyroid  []  non tender  []  tender    RESP:   [] CTA bilaterally/no wheezing/rhonchi/rales/crackles    [] rhonchi bilaterally - no dullness  [] wheezing   [] rhonchi   [] crackles     use of accessory muscles [] yes [] no    CARD:   [x]  regular rate and rhythm    murmur  [] yes ()  [] no      Rubs  [] yes  [] no       Gallops [] yes  [] no    Rate []  regular  []  irregular        carotid bruits  [] Right  []  Left                 LE edema [] yes  [] no           JVP  []  yes   []  no    ABD:    [x] soft/non distended/non tender/+bowel sounds/no HSM    []  Rigid    tenderness [] yes [] no   Liver enlargement  []  yes []  no                Spleen enlargement  []  yes []  no     distended []  yes [] no     bowel sound  [] hypoactive   [] hyperactive    LYMPH:    Neck []  yes [x]  no       Axillae []  yes [x]  no    SKIN:   Rashes []  yes   [x]  no    Ulcers []  yes   []  no               [] tight to palpitation    skin turgor []  good  [] poor  [] decreased               Cyanosis/clubbing []  yes []  no    NEUR:   [x] cranial nerves II-XII grossly intact       [] Cranial nerves deficit                 []  facial droop    []  slurred speech   [] aphasic     [] Strength normal     []  weakness  []  LUE  []   RUE/ []  LLE  []   RLE    follows commands  [x]  yes []  no           PSYCH:   insight [] poor [x] good   Alert and Oriented to  [x] person  [x] place  [x]  time                    [] depressed [] anxious [] agitated  [] lethargic [] stuporous  [] sedated     LAB DATA REVIEWED:    Recent Labs      09/11/18   0617  09/10/18   1752   WBC  2.2*  2.9*   HGB  10.9*  11.2*   HCT  35.5  36.8   PLT  169  184     Recent Labs      09/11/18   0617  09/10/18   1752   NA  136  136   K  5.3*  5.6*   CL  97  98   CO2  19*  24   BUN  102*  100*   CREA  10.30*  9.71*   GLU  91  89   CA  8.3*  8.4*   MG  2.1   --    PHOS  8.2*   --      Recent Labs 09/11/18   0617  09/10/18   1752   SGOT  27  34   ALT  23  23   AP  69  77   TBILI  0.5  0.4   ALB  3.2*  3.6   GLOB  4.7*  5.1*   AML  325*   --    LPSE  376   --      No results for input(s): INR, PTP, APTT in the last 72 hours. No lab exists for component: INREXT   No results for input(s): FE, TIBC, PSAT, FERR in the last 72 hours. No results for input(s): PH, PCO2, PO2 in the last 72 hours.   Recent Labs      09/11/18 0617 09/11/18   0016   CPK  42  48   CKMB  1.9  1.7     Lab Results   Component Value Date/Time    Glucose (POC) 104 (H) 08/16/2018 11:06 AM    Glucose (POC) 164 (H) 07/22/2018 11:43 AM    Glucose (POC) 138 (H) 07/21/2018 09:22 PM    Glucose (POC) 111 (H) 07/21/2018 04:57 PM    Glucose (POC) 105 (H) 07/21/2018 11:38 AM       Procedures: see electronic medical records for all procedures/Xrays and details which were not copied into this note but were reviewed prior to creation of Plan.    ________________________________________________________________________       ___________________________________________________  Consulting Physician: Nilam Johnston MD

## 2018-09-11 NOTE — DIALYSIS
[facility]  [phone]    Vitals   Pre   Post   Assessment   Pre   Post     Temp  Temp: 97.9 °F (36.6 °C) (09/11/18 1529)  98.1 @1900 LOC  A/OX3 A/OX3     HR   Pulse (Heart Rate): 76 (09/11/18 1529) 100 Lungs   CLEAR & EASY RESPIRATIONS  CLEAR & EASY RESPIRATIONS   B/P   BP: (!) 135/99 (09/11/18 1529) 160/102 Cardiac   SINUS RHYTHM  SINUS RHYTHM   Resp   Resp Rate: 22 (09/11/18 1529) 26 Skin   DRY INTACT  DRY INTACT   Pain level  Pain Intensity 1: 7 (09/11/18 1322) 0 out of 10 Edema  GENERALIZED     GENERALIZED   Orders:    Duration:   Start:    1529 End:   1859 Total:   3.5 HRS   Dialyzer:   Dialyzer/Set Up Inspection: Toan Freeman (09/11/18 1529)   K Bath:   Dialysate K (mEq/L): 2 (09/11/18 1529)   Ca Bath:   Dialysate CA (mEq/L): 2.5 (09/11/18 1529)   Na/Bicarb:   Dialysate NA (mEq/L): 140 (09/11/18 1529)   Target Fluid Removal:   Goal/Amount of Fluid to Remove (mL): 3000 mL (09/11/18 1529)   Access     Type & Location:   RUAVG: positive bruit/thrill cannulated with 15g needles x2 without difficulty, good blood return, patent when flushed. Start of treatment. Labs     Obtained/Reviewed   Critical Results Called   Date when labs were drawn-  Hgb-    HGB   Date Value Ref Range Status   09/11/2018 10.9 (L) 11.5 - 16.0 g/dL Final     K-    Potassium   Date Value Ref Range Status   09/11/2018 5.3 (H) 3.5 - 5.1 mmol/L Final     Ca-   Calcium   Date Value Ref Range Status   09/11/2018 8.3 (L) 8.5 - 10.1 MG/DL Final     Bun-   BUN   Date Value Ref Range Status   09/11/2018 102 (H) 6 - 20 MG/DL Final     Creat-   Creatinine   Date Value Ref Range Status   09/11/2018 10.30 (H) 0.55 - 1.02 MG/DL Final        Medications/ Blood Products Given     Name   Dose   Route and Time                     Blood Volume Processed (BVP):    76 liters Net Fluid   Removed:  3000mls   Comments   Time Out Done: 4602  Primary Nurse Rpt Pre:DAVID AWAD  Primary Nurse Rpt Post:DAVID AWAD  Pt Education:PROCEDURAL, FLUID [address] Plan:Continue to follow the care of the nephrologist    Tx Summary: Patient tolerated treatment well. Patient remained hypertensive throughout treatment and asymptomatic. Rinse back all possible blood back to patient. Decannulated 15g needles x2 without difficulty. Achieved homeostasis. Dressing c/d/i. Report given to primary RN. Admiting Diagnosis:hypertension, troponin elevated, esrd  Pt's previous clinic-[facility] signed - Informed Consent Verified: Yes (09/11/18 1529)  [place] Consent -Yes  Hepatitis Status- neg [id number] [phone]  Machine #- Machine Number: [id number] (09/11/18 1529)  Telemetry status-bedside  Pre-dialysis wt. - Pre-Dialysis Weight: 62.7 kg (138 lb 3.7 oz) (09/11/18 1529)

## 2018-09-11 NOTE — ED NOTES
Bedside and Verbal shift change report given to [place], RN (oncoming nurse) by Phu Valenzuela RN (offgoing nurse). Report included the following information SBAR, ED Summary, MAR and Recent Results.

## 2018-09-11 NOTE — PROGRESS NOTES
TRANSFER - IN REPORT:    Verbal report received from Cindy(name) on Marybeth Augustine  being received from ED(unit) for routine progression of care      Report consisted of patients Situation, Background, Assessment and   Recommendations(SBAR). Information from the following report(s) SBAR, Kardex, ED Summary, [facility] and Recent Results was reviewed with the receiving nurse. Opportunity for questions and clarification was provided. Assessment completed upon patients arrival to unit and care assumed. 0800 Bedside and Verbal shift change report given to Dari Hernandez (oncoming nurse) by Noris Matamoros (offgoing nurse). Report included the following information SBAR, Kardex, ED Summary, MAR, Recent Results and Cardiac Rhythm normal sinus.

## 2018-09-11 NOTE — PROGRESS NOTES
Admission Medication Reconciliation:    Information obtained from: patient, rx query, chart review    Significant PMH/Disease States:   Past Medical History:   Diagnosis Date    Anemia     secondary to lupus    Asthma     no inhaler use in past 2 to 3 years    Carditis     Chronic kidney disease     ESRD    Chronic pain     DDD (degenerative disc disease), lumbar     [facility]     GERD (gastroesophageal reflux disease)     [facility] 12[facility]/2017    [address]  Tuesday,  Thursday,  and Saturday.  Hypercholesterolemia     Hypertension     Intractable nausea and vomiting 10/21/2015    Long term (current) use of anticoagulants     Lupus (systemic lupus erythematosus) (HCC)     Malignant hypertension with chronic kidney disease stage V ([facility]     [facility] ([facility] 10/2015    x 2 years Stopped 12/2017 due to infection and removed.  Poor historian 01/17/2018    With medications    Thromboembolus [facility]) 2013    lungs    Transfusion history     Last Transfusion 12/21/2017  at [address] [place] [address]       Chief Complaint for this Admission:  fatigue    Allergies:  Compazine [prochlorperazine edisylate]    Prior to Admission Medications:   Prior to Admission Medications   Prescriptions Last Dose Informant Patient Reported? Taking? albuterol (PROVENTIL HFA, VENTOLIN HFA, PROAIR HFA) 90 mcg/actuation inhaler  Self No Yes   Sig: Take 1-2 Puffs by inhalation every four (4) hours as needed for Wheezing or Shortness of Breath. amLODIPine (NORVASC) 10 mg tablet 9/10/2018 at Unknown time  No Yes   Sig: Take 1 Tab by mouth daily. amitriptyline (ELAVIL) 25 mg tablet 9/9/2018 at Unknown time  Yes Yes   Sig: Take 25 mg by mouth nightly. apixaban (ELIQUIS) 5 mg tablet 9/10/2018 at Unknown time  Yes Yes   Sig: Take 5 mg by mouth two (2) times a day.  Indications: pulmonary thromboembolism   azaTHIOprine (IMURAN) 50 mg tablet 9/10/2018 at Unknown time Self Yes Yes   Sig: Take 50 mg by mouth daily (after breakfast). carvedilol (COREG) 25 mg tablet 9/10/2018 at Unknown time  No Yes   Sig: Take 1 Tab by mouth two (2) times daily (with meals). cloNIDine (CATAPRES) 0.3 mg/24 hr 2018  Yes Yes   Si Patch by TransDERmal route every seven (7) days. ferric citrate (AURYXIA) 210 mg iron tablet 9/10/2018 at Unknown time  Yes Yes   Sig: Take 210 mg by mouth three (3) times daily (with meals). gemfibrozil (LOPID) 600 mg tablet 9/10/2018 at Unknown time Self Yes Yes   Sig: Take 300 mg by mouth daily. hydroxychloroquine (PLAQUENIL) 200 mg tablet 9/10/2018 at Unknown time Self Yes Yes   Sig: Take 200 mg by mouth two (2) times a day. losartan (COZAAR) 25 mg tablet 9/10/2018 at Unknown time  Yes Yes   Sig: Take 25 mg by mouth daily. oxyCODONE-acetaminophen (PERCOCET) 5-325 mg per tablet   No Yes   Sig: Take 1 tablet, two to three times a day, if needed for severe pain. pantoprazole (PROTONIX) 40 mg tablet 9/10/2018 at Unknown time Self No Yes   Sig: Take 1 Tab by mouth Daily (before breakfast). polyethylene glycol (MIRALAX) 17 gram packet   Yes Yes   Sig: Take 17 g by mouth daily as needed. predniSONE (DELTASONE) 5 mg tablet 9/10/2018 at Unknown time  No Yes   Sig: Take 2 Tabs by mouth daily. senna (SENNA) 8.6 mg tablet   Yes Yes   Sig: Take 1 Tab by mouth daily as needed for Constipation. for constipation      Facility-Administered Medications: None         Comments/Recommendations: Removed clonidine tabs as patient was changed to the weekly patch which she last applied last Friday. Added auryxia and losartan. No other changes made. Patient did not have any questions at this time.     Becky Cox, PharmD  ED EXT [id number]

## 2018-09-11 NOTE — ED NOTES
Pt requesting pain medications and reporting 7/10 pain at this time. Pt transferred to ED room 14 from [address] [place] [address]. Pt ambulated with steady gait to restroom and then to ED room.

## 2018-09-11 NOTE — PROGRESS NOTES
Consult dictated    SLE flare up  CP elevated trop ?  Myocarditis  Echo in am  High BP, control, meds

## 2018-09-11 NOTE — CONSULTS
[address]    Armando Robles.  MR#: [id number]  : 1985  ACCOUNT #: [de-identified]   DATE OF SERVICE: 09/10/2018  Assessment/Plan/Discussion:     1. Elevated troponin with chest pain. I would wonder less about epicardial CAD  disease and more about a myocarditis which could be possible. She should get an echo in the morning. Repeat troponin in the morning. 2.  Markedly hypertensive at 205 but in the  setting of renal disease. She has, at this time, unclear as to meds  , will clarify and renal will manage  3. Fu echo enzymes in am     Mildred Ambrocio is a 29 y.o. female   We are asked to see the patient for an elevated troponin. Reports that she developed chest pain since , which would be about 24-48 hours. It can come and go. It is not necessarily related with exertion or position. She had one episode of shortness of breath. She has a cardiac history of prior pericardial effusion. She has severe systemic lupus. She also has a history of pulmonary embolism. Troponin 0.36. She is comfortable and conversant, sitting up in a chair, awaiting admission in the emergency room. She denies palpitations, syncope or edema. PAST MEDICAL HISTORY:  Includes carditis, end-stage renal disease on dialysis, followed by Dr. Sophy Duran at [facility], hypercholesterolemia, hypertension, systemic lupus erythematosus, malignant hypertension, . EKG here showed sinus tachycardia, nonspecific. She had an echo 2018 that showed EF 55-60%, mild to moderate MR and TR with no pericardial effusion. PAST SURGICAL HISTORY:  Vascular access, James surgery, . FAMILY HISTORY:  Mother and father with diabetes and hypertension. SOCIAL HISTORY:  Never smoked, drinks no alcohol. ALLERGIES:  INCLUDE COMPAZINE. MEDICATIONS:  Prior to admission reviewed.     REVIEW OF SYSTEMS:  An 11-system review of systems was negative except as noted. PHYSICAL EXAMINATION:  VITAL SIGNS:  Blood pressure 122/133, pulse 60. GENERAL:  She has bloated facies from steroids with moon facies. Comfortable conversant, pleasant, in no acute distress, alert, oriented x 3. HEENT:  Eyes anicteric sclerae. Normocephalic, atraumatic. NECK:  Supple. No JVD. HEART:  Regular rate and rhythm without murmur, rub or gallop. ABDOMEN:  Nontender. EXTREMITIES:  Show 1+ non pitting ankle edema    LABORATORY DATA:  White count is 2.9, hemoglobin 11.2, platelet count 303. Sodium 136, K 5.6, chloride 94, bicarbonate 24, , creatinine 9.7, GFR 5, albumin 3.6, AST 34, alkaline phosphatase 77. Troponin 0.36. Prescriptions Prior to Admission   Medication Sig    ferric citrate (AURYXIA) 210 mg iron tablet Take 210 mg by mouth three (3) times daily (with meals).  losartan (COZAAR) 25 mg tablet Take 25 mg by mouth daily.  cloNIDine (CATAPRES) 0.3 mg/24 hr 1 Patch by TransDERmal route every seven (7) days.  oxyCODONE-acetaminophen (PERCOCET) 5-325 mg per tablet Take 1 tablet, two to three times a day, if needed for severe pain.  carvedilol (COREG) 25 mg tablet Take 1 Tab by mouth two (2) times daily (with meals).  amLODIPine (NORVASC) 10 mg tablet Take 1 Tab by mouth daily.  apixaban (ELIQUIS) 5 mg tablet Take 5 mg by mouth two (2) times a day. Indications: pulmonary thromboembolism    polyethylene glycol (MIRALAX) 17 gram packet Take 17 g by mouth daily as needed.  predniSONE (DELTASONE) 5 mg tablet Take 2 Tabs by mouth daily.  senna (SENNA) 8.6 mg tablet Take 1 Tab by mouth daily as needed for Constipation. for constipation    amitriptyline (ELAVIL) 25 mg tablet Take 25 mg by mouth nightly.  gemfibrozil (LOPID) 600 mg tablet Take 300 mg by mouth daily.  azaTHIOprine (IMURAN) 50 mg tablet Take 50 mg by mouth daily (after breakfast).     albuterol (PROVENTIL HFA, VENTOLIN HFA, PROAIR HFA) 90 mcg/actuation inhaler Take 1-2 Puffs by inhalation every four (4) hours as needed for Wheezing or Shortness of Breath.  hydroxychloroquine (PLAQUENIL) 200 mg tablet Take 200 mg by mouth two (2) times a day.  pantoprazole (PROTONIX) 40 mg tablet Take 1 Tab by mouth Daily (before breakfast). Past Medical History:   Diagnosis Date    Anemia     secondary to lupus    Asthma     no inhaler use in past 2 to 3 years    Carditis     Chronic kidney disease     ESRD    Chronic pain     DDD (degenerative disc disease), lumbar     ESRD (end stage renal disease) (HCC)     GERD (gastroesophageal reflux disease)     Hemod[facility] ([facility] 2017    [address]  Tuesday,  Thursday,  and Saturday.  Hypercholesterolemia     Hypertension     Intractable nausea and vomiting 10/21/2015    Long term (current) use of anticoagulants     Lupus (systemic lupus erythematosus) (HCC)     Malignant hypertension with chronic kidney disease stage V ([facility]     Peritoneal dialysis status ([facility] 10/2015    x 2 years Stopped 2017 due to infection and removed.  Poor historian 2018    With medications    Thromboembolus ([facility] 2013    lungs    Transfusion history     Last Transfusion 2017  at [facility]     Past Surgical History:   Procedure Laterality Date    HX  SECTION  11/2006    x1    HX OTHER SURGICAL  9/16/15    INSERTION PD CATH; Removed 2017    HX VASCULAR ACCESS Right 2017    Double-Lumen henry catheter upper chest      Social History     Social History    Marital status: SINGLE     Spouse name: N/A    Number of children: N/A    Years of education: N/A     Occupational History    Not on file.      Social History Main Topics    Smoking status: Never Smoker    Smokeless tobacco: Never Used    Alcohol use No    Drug use: Yes     Special: Prescription, OTC    Sexual activity: Not Currently     Other Topics Concern     Service No    Blood Transfusions No    Caffeine Concern No    Occupational Exposure No    Hobby Hazards No    Sleep Concern No    Stress Concern No    Weight Concern No    Special Diet No    Back Care Yes     low back pain    Exercise No    Bike Helmet No    Seat Belt Yes    Self-Exams No     Social History Narrative    Lives with parents and daughter. family history includes Cancer in an other family member; Diabetes in her father and mother; Hypertension in her father.   Allergies   Allergen Reactions    Compazine [Prochlorperazine Edisylate] Nausea and Vomiting and Palpitations      MD ARABELLA [place] / [place]  D: 09/10/2018 22:21     T: 09/10/2018 22:46  JOB #: [id number]

## 2018-09-11 NOTE — H&P
[address] HISTORY AND PHYSICAL Donny Cha 
MR#: [id number] : 1985 ACCOUNT #: [de-identified] ADMIT DATE: 09/10/2018 PRIMARY CARE PROVIDER:  Jessica Morris NP    
 
SOURCE OF INFORMATION:  The patient. CHIEF COMPLAINT:  Fatigue. HISTORY OF PRESENT ILLNESS:  This is a 29-year-old woman with a past medical history significant for dyslipidemia, pulmonary embolism, end-stage renal disease on hemodialysis, chronic pain syndrome, asthma, systemic lupus erythematosus, was in her usual state of health until a couple of days ago when patient started experiencing fatigue which is progressive. The fatigue is associated with generalized body aches and pain including abdominal pain and chest pain. The patient also complained of nausea but no vomiting. She thought that she is having exacerbation of systemic lupus erythematosus. Because of that, the patient came to the emergency room. The patient used to be on peritoneal dialysis, but because of a recurrent abdominal infection, the patient was converted from peritoneal dialysis to hemodialysis. She is receiving hemodialysis 3 times a week, Saturday, Tuesday, and Thursday. She has not missed a session of routine dialysis. No associated fever, no rigors, and no cough. When the patient arrived at the emergency room, her blood pressure was markedly elevated. The patient also has an elevated troponin level. The emergency room physician consulted the cardiologist on call. Admission to the hospital was advised. The patient was referred to the hospitalist service for that purpose. The patient was last admitted to this hospital from 08/15/2018 to 2018. She was admitted with shortness of breath which resolved after hemodialysis. She was also found to have an elevated troponin level during that hospitalization as seen by cardiology. The patient has no significant shortness of breath today. The abdominal pain is located at the lower abdomen, cramping, no radiation, 6/10 in severity, no known aggravating or relieving factors. The chest pain is more diffuse, associated with nausea. The chest pain is also a 7/10 in severity with no radiation, with no known aggravating or relieving factors. PAST MEDICAL HISTORY:  Hypertension, dyslipidemia, pulmonary embolism on Eliquis, end-stage renal disease on hemodialysis, chronic pain syndrome secondary to chronic back pain, asthma, systemic lupus erythematous. ALLERGIES:  THE PATIENT IS ALLERGIC TO COMPAZINE. MEDICATIONS:  Albuterol 90 mcg 2 puffs by inhalation every 4 hours as needed for wheezing, Elavil 25 mg daily at bedtime, Norvasc 10 mg daily, Eliquis 5 mg twice daily, Imuran 50 mg daily, Coreg 25 mg twice daily, clonidine 0.3 mg 1 patch applied to skin every 7 days, ferric citrate 210 mg 3 times daily, Lopid 300 mg daily, Plaquenil 200 mg twice daily, losartan 25 mg daily, Percocet 5/325 one tablet 3 times daily, Protonix 40 mg daily, MiraLax 17 grams daily as needed, prednisone 5 mg 2 tablets daily, senna 8.6 mg daily as needed for constipation. FAMILY HISTORY:  This was reviewed. Father had diabetes, hypertension. Mother had diabetes. PAST SURGICAL HISTORY:  This is significant for a  section, vascular access placement for hemodialysis. SOCIAL HISTORY:  No history of alcohol or tobacco abuse. REVIEW OF SYSTEMS:   
HEAD, EYES, EARS, NOSE, AND THROAT:  No headache, no dizziness, no blurring of vision, no photophobia. RESPIRATORY SYSTEM:  No cough, no shortness of breath, no hemoptysis. CARDIOVASCULAR SYSTEM:  This is positive for chest pain. No orthopnea, no palpitations. GASTROINTESTINAL SYSTEM:  This is positive for abdominal pain, nausea. No vomiting, no diarrhea, no constipation. GENITOURINARY SYSTEM:  No dysuria, no urgency, and no frequency. All other systems are reviewed and they are negative. PHYSICAL EXAMINATION:   
GENERAL APPEARANCE:  The patient appeared ill, in moderate distress. VITAL SIGNS:  On arrival at the emergency room, temperature 97.8, pulse 113, respiratory rate 16, blood pressure of 205/142, oxygen saturation 97% on room air. HEENT:  Normocephalic, atraumatic. EYES:  Normal eye movements. No redness, no drainage, no discharge. EARS:  Normal external ears with no obvious drainage. NOSE:  No deformity, no drainage. MOUTH AND THROAT:  No visible oral lesions. NECK:  Supple. No JVD, no thyromegaly. CHEST:  Clear breath sounds. No wheezing, no crackles. HEART:  Normal S1 and S2, regular. No clinically appreciable murmurs. ABDOMEN:  Soft, nontender, normal bowel sounds. CENTRAL NERVOUS SYSTEM:  Alert, oriented x3. No gross focal neurological deficit. EXTREMITIES:  No edema. Pulses 2+ bilaterally. MUSCULOSKELETAL SYSTEM:  No obvious joint deformity or swelling. SKIN:  No active skin lesions seen in the exposed parts of the body. PSYCHIATRIC:  Normal mood and affect. LYMPHATIC SYSTEM:  No cervical lymphadenopathy. DIAGNOSTIC DATA:  EKG shows sinus tachycardia and nonspecific T-wave abnormalities in the lateral lead. LABORATORY DATA:  Hematology:  WBC 2.9, hemoglobin 11.2, hematocrit 36.8, platelet 098. Cardiac profile:  Troponin 0.36. Chemistry:  Sodium 136, potassium 5.6, chloride 98, CO2 of 24, glucose 89, , creatinine 9.71, calcium 8.4, total bilirubin 0.4, ALT 23, AST 34, alkaline phosphatase 77, total protein 8.7, albumin level 3.6, globulin 5.1. ASSESSMENT:   
1.  Malignant hypertensive urgency. 2.  Suspected non-ST elevation myocardial infarction. 3.  Dyslipidemia. 4.  Thromboembolism. 5.  End-stage renal disease, on hemodialysis. 6.  Hyperkalemia. 7.  Leukopenia. 8.  Chronic pain syndrome secondary to chronic back pain. 9.  Asthma. 10.  Systemic lupus erythematosus. PLAN:   
1. Malignant hypertensive urgency. Will start the patient on Cardene drip for treatment of malignant hypertensive urgency. Will obtain a CT scan of the head to rule out acute pathology. The patient stated that it has become  very difficult to control her blood pressure despite taking her medication as prescribed. The patient will require adjustments to her home medication at the time of discharge from the hospital by either the nephrologist or the cardiologist.  Will monitor the patient closely. 2.  Suspected non-ST elevation myocardial infarction. The patient presented with elevated troponin level. EKG shows nonspecific ST abnormalities in the lateral leads. The patient also has chest pain. The patient is already on Eliquis. Will continue with the Eliquis, will continue with the beta blocker. Cardiology consult has been requested. Will await further recommendation from the cardiologist.  Will trend troponin level. 3.  Dyslipidemia. Will continue with the home medication. 4.  Thromboembolism. The patient has a pulmonary embolism present on admission. The patient will continue with the Eliquis for pulmonary embolism and also for non-ST elevation myocardial infarction. Because the patient is already on Eliquis and the patient took the Eliquis before coming to the emergency room, will not be able to start the patient on heparin for treatment of non-ST elevation myocardial infarction. 5.  End-stage renal disease, on hemodialysis. The patient's nephrologist will be consulted for continuation of her routine hemodialysis. 6.  Hyperkalemia. Will treat with Kayexalate and repeat a potassium level. The patient is also awaiting hemodialysis on Tuesday morning. 7.  Leukopenia. The patient is asymptomatic. Will monitor the patient's platelet count. 8.  Chronic pain syndrome secondary to chronic back pain.   Will carry out pain control. 9.  Asthma. Will place the patient on DuoNeb as needed. 10.  Systemic lupus erythematous. Will resume the preadmission medication. Will check ESR and C-reactive protein levels. OTHER ISSUES:  CODE STATUS:  THE PATIENT IS A FULL CODE. The patient is already on Eliquis, and because of that, there is no need for DVT prophylaxis with heparin. MD EBONY Crowe/ 
D: 09/11/2018 05:33 T: 09/11/2018 07:58 JOB #: [id number]

## 2018-09-11 NOTE — PROGRESS NOTES
Problem: Falls - Risk of  Goal: *Absence of Falls  Document Alex Fall Risk and appropriate interventions in the flowsheet. Outcome: Progressing Towards Goal  Fall Risk Interventions:            Medication Interventions: Patient to call before getting OOB    Elimination Interventions: Call light in reach, Patient to call for help with toileting needs             Pt on RA. Pt up ad gonsalo. Pt got dialysis today. Dr. Quynh Sarah increase pt's pain medication. Will continue to monitor and educate. Bedside shift change report given to Maurizio RN (oncoming nurse) by Frances Zuniga RN (offgoing nurse). Report included the following information SBAR, Kardex, MAR, Accordion, Recent Results, Med Rec Status, Cardiac Rhythm NSR and Alarm Parameters .

## 2018-09-12 LAB
ALBUMIN SERPL-MCNC: 3.2 G/DL (ref 3.5–5)
ANION GAP SERPL CALC-SCNC: 10 MMOL/L (ref 5–15)
BUN SERPL-MCNC: 44 MG/DL (ref 6–20)
BUN/CREAT SERPL: 7 (ref 12–20)
CALCIUM SERPL-MCNC: 7.7 MG/DL (ref 8.5–10.1)
CHLORIDE SERPL-SCNC: 103 MMOL/L (ref 97–108)
CO2 SERPL-SCNC: 27 MMOL/L (ref 21–32)
CREAT SERPL-MCNC: 6.05 MG/DL (ref 0.55–1.02)
ERYTHROCYTE [DISTWIDTH] IN BLOOD BY AUTOMATED COUNT: 15.2 % (ref 11.5–14.5)
GLUCOSE SERPL-MCNC: 91 MG/DL (ref 65–100)
HCT VFR BLD AUTO: 35.7 % (ref 35–47)
HGB BLD-MCNC: 11 G/DL (ref 11.5–16)
MAGNESIUM SERPL-MCNC: 2.1 MG/DL (ref 1.6–2.4)
MCH RBC QN AUTO: 28.6 PG (ref 26–34)
MCHC RBC AUTO-ENTMCNC: 30.8 G/DL (ref 30–36.5)
MCV RBC AUTO: 93 FL (ref 80–99)
NRBC # BLD: 0 K/UL (ref 0–0.01)
NRBC BLD-RTO: 0 PER 100 WBC
PHOSPHATE SERPL-MCNC: 5.4 MG/DL (ref 2.6–4.7)
PLATELET # BLD AUTO: 179 K/UL (ref 150–400)
PMV BLD AUTO: 10.8 FL (ref 8.9–12.9)
POTASSIUM SERPL-SCNC: 4.1 MMOL/L (ref 3.5–5.1)
RBC # BLD AUTO: 3.84 M/UL (ref 3.8–5.2)
SODIUM SERPL-SCNC: 140 MMOL/L (ref 136–145)
WBC # BLD AUTO: 1.6 K/UL (ref 3.6–11)

## 2018-09-12 PROCEDURE — 80069 RENAL FUNCTION PANEL: CPT | Performed by: INTERNAL MEDICINE

## 2018-09-12 PROCEDURE — 74011250636 HC RX REV CODE- 250/636: Performed by: INTERNAL MEDICINE

## 2018-09-12 PROCEDURE — 83735 ASSAY OF MAGNESIUM: CPT | Performed by: INTERNAL MEDICINE

## 2018-09-12 PROCEDURE — 74011250637 HC RX REV CODE- 250/637: Performed by: SPECIALIST

## 2018-09-12 PROCEDURE — 74011636637 HC RX REV CODE- 636/637: Performed by: INTERNAL MEDICINE

## 2018-09-12 PROCEDURE — 36415 COLL VENOUS BLD VENIPUNCTURE: CPT | Performed by: INTERNAL MEDICINE

## 2018-09-12 PROCEDURE — 74011250637 HC RX REV CODE- 250/637: Performed by: INTERNAL MEDICINE

## 2018-09-12 PROCEDURE — 93306 TTE W/DOPPLER COMPLETE: CPT

## 2018-09-12 PROCEDURE — 65660000000 HC RM CCU STEPDOWN

## 2018-09-12 PROCEDURE — 85027 COMPLETE CBC AUTOMATED: CPT | Performed by: INTERNAL MEDICINE

## 2018-09-12 PROCEDURE — 74011000250 HC RX REV CODE- 250: Performed by: INTERNAL MEDICINE

## 2018-09-12 RX ORDER — LOSARTAN POTASSIUM 25 MG/1
25 TABLET ORAL DAILY
Status: DISCONTINUED | OUTPATIENT
Start: 2018-09-12 | End: 2018-09-13

## 2018-09-12 RX ORDER — AMLODIPINE BESYLATE 5 MG/1
10 TABLET ORAL DAILY
Status: DISCONTINUED | OUTPATIENT
Start: 2018-09-12 | End: 2018-09-19 | Stop reason: HOSPADM

## 2018-09-12 RX ORDER — CLONIDINE 0.3 MG/24H
1 PATCH, EXTENDED RELEASE TRANSDERMAL
Status: COMPLETED | OUTPATIENT
Start: 2018-09-14 | End: 2018-09-18

## 2018-09-12 RX ADMIN — HYDROCODONE BITARTRATE AND ACETAMINOPHEN 1 TABLET: 7.5; 325 TABLET ORAL at 17:50

## 2018-09-12 RX ADMIN — FENTANYL CITRATE 50 MCG: 50 INJECTION, SOLUTION INTRAMUSCULAR; INTRAVENOUS at 21:39

## 2018-09-12 RX ADMIN — METOPROLOL TARTRATE 25 MG: 25 TABLET ORAL at 17:26

## 2018-09-12 RX ADMIN — FERROUS SULFATE TAB 325 MG (65 MG ELEMENTAL FE) 325 MG: 325 (65 FE) TAB at 11:59

## 2018-09-12 RX ADMIN — Medication 10 ML: at 13:12

## 2018-09-12 RX ADMIN — AZATHIOPRINE 50 MG: 50 TABLET ORAL at 09:13

## 2018-09-12 RX ADMIN — APIXABAN 5 MG: 5 TABLET, FILM COATED ORAL at 08:27

## 2018-09-12 RX ADMIN — SODIUM CHLORIDE 2.5 MG/HR: 900 INJECTION, SOLUTION INTRAVENOUS at 19:25

## 2018-09-12 RX ADMIN — FERRIC CITRATE 210 MG: 210 TABLET, COATED ORAL at 13:12

## 2018-09-12 RX ADMIN — FENTANYL CITRATE 50 MCG: 50 INJECTION, SOLUTION INTRAMUSCULAR; INTRAVENOUS at 04:57

## 2018-09-12 RX ADMIN — FENTANYL CITRATE 50 MCG: 50 INJECTION, SOLUTION INTRAMUSCULAR; INTRAVENOUS at 10:40

## 2018-09-12 RX ADMIN — METOPROLOL TARTRATE 25 MG: 25 TABLET ORAL at 06:55

## 2018-09-12 RX ADMIN — GEMFIBROZIL 300 MG: 600 TABLET ORAL at 08:27

## 2018-09-12 RX ADMIN — FERRIC CITRATE 210 MG: 210 TABLET, COATED ORAL at 17:26

## 2018-09-12 RX ADMIN — APIXABAN 5 MG: 5 TABLET, FILM COATED ORAL at 17:26

## 2018-09-12 RX ADMIN — AMITRIPTYLINE HYDROCHLORIDE 25 MG: 50 TABLET, FILM COATED ORAL at 21:39

## 2018-09-12 RX ADMIN — METOPROLOL TARTRATE 25 MG: 25 TABLET ORAL at 11:59

## 2018-09-12 RX ADMIN — SODIUM CHLORIDE 2.5 MG/HR: 900 INJECTION, SOLUTION INTRAVENOUS at 08:26

## 2018-09-12 RX ADMIN — PREDNISONE 10 MG: 5 TABLET ORAL at 08:27

## 2018-09-12 RX ADMIN — Medication 10 ML: at 06:00

## 2018-09-12 RX ADMIN — PANTOPRAZOLE SODIUM 40 MG: 40 TABLET, DELAYED RELEASE ORAL at 06:55

## 2018-09-12 RX ADMIN — ASPIRIN 81 MG CHEWABLE TABLET 81 MG: 81 TABLET CHEWABLE at 08:27

## 2018-09-12 RX ADMIN — FENTANYL CITRATE 50 MCG: 50 INJECTION, SOLUTION INTRAMUSCULAR; INTRAVENOUS at 16:16

## 2018-09-12 RX ADMIN — Medication 10 ML: at 22:00

## 2018-09-12 RX ADMIN — FERROUS SULFATE TAB 325 MG (65 MG ELEMENTAL FE) 325 MG: 325 (65 FE) TAB at 08:27

## 2018-09-12 RX ADMIN — LOSARTAN POTASSIUM 25 MG: 25 TABLET ORAL at 11:59

## 2018-09-12 RX ADMIN — HYDROXYCHLOROQUINE SULFATE 200 MG: 200 TABLET, FILM COATED ORAL at 08:27

## 2018-09-12 RX ADMIN — AMLODIPINE BESYLATE 10 MG: 5 TABLET ORAL at 11:59

## 2018-09-12 RX ADMIN — HYDROXYCHLOROQUINE SULFATE 200 MG: 200 TABLET, FILM COATED ORAL at 17:26

## 2018-09-12 NOTE — PROGRESS NOTES
Reason for Admission:   Admitted from home with complaints of fatigue. Patient has a history of Lupus and ESRD on dialysis. RRAT Score:     20             Do you (patient/family) have any concerns for transition/discharge? Plan for utilizing home health:     No recent history of home health services. Likelihood of readmission? Moderate risk/Yellow zone            Transition of Care Plan:  Anticipate discharge to home. Will follow and assist with any discharge needs. Care Management Interventions  PCP Verified by CM:  Yes (Dr Tanya Condon)  Palliative Care Criteria Met (RRAT>21 & CHF Dx)?: No  Mode of Transport at Discharge:  (Family car)  Transition of Care Consult (CM Consult): Discharge Planning  Current Support Network: Own Home  Confirm Follow Up Transport: Self  Plan discussed with Pt/Family/Caregiver: Yes  Freedom of Choice Offered: Yes  [address] Provided?:  (NA)  Discharge Location  Discharge Placement: [address] [facility]  Ext [phone]

## 2018-09-12 NOTE — PROGRESS NOTES
0332 - 3 lb weight loss noted. Pt weighed on standing scale on both 9/11 and 9/12. Pt received dialysis on 9/11. Will address with day RN. 0800 - Bedside and Verbal shift change report given to [employer] (oncoming nurse) by Maurizio (offgoing nurse). Report included the following information SBAR, Kardex, ED Summary, Procedure Summary, Intake/Output, MAR, Accordion, Recent Results, Med Rec Status and Cardiac Rhythm NSR. Problem: Falls - Risk of  Goal: *Absence of Falls  Document Alex Fall Risk and appropriate interventions in the flowsheet. Outcome: Progressing Towards Goal  Fall Risk Interventions:            Medication Interventions: Evaluate medications/consider consulting pharmacy, Patient to call before getting OOB, Teach patient to arise slowly    Elimination Interventions: Call light in reach, Patient to call for help with toileting needs, Toileting schedule/hourly rounds             Problem: Hypertension  Goal: *Blood pressure within specified parameters  Outcome: Not Progressing Towards Goal  Pt diastolic BP still remaining above 100; pt on cardene gtt @2.5 mg/hr. Pt at systolic goal of below 998.

## 2018-09-12 NOTE — PROGRESS NOTES
NAME: Chauncey Baldwin        :  1985        MRN:  [id number]        Assessment :    Plan:  --[id number]-[id number]-EZU-Valier  Anemia  CP --No acute need for HD today. Plan HD in AM.    Resume home meds:  Losartan, amlodipine. TTS-3 q Friday. Subjective:     Chief Complaint:  \" I feel fine. \"  No CP. No N/V. No dyspnea. Review of Systems:    Symptom Y/N Comments  Symptom Y/N Comments   Fever/Chills    Chest Pain     Poor Appetite    Edema     Cough    Abdominal Pain     Sputum    Joint Pain     SOB/IVY    Pruritis/Rash     Nausea/vomit    Tolerating PT/OT     Diarrhea    Tolerating Diet     Constipation    Other       Could not obtain due to:      Objective:     VITALS:   Last 24hrs VS reviewed since prior progress note.  Most recent are:  Visit Vitals    BP (!) 134/99 (BP 1 Location: Left arm, BP Patient Position: At rest)    Pulse 84    Temp 98 °F (36.7 °C)    Resp 16    Ht 5' 5\" (1.651 m)    Wt 61.4 kg (135 lb 5.8 oz)    SpO2 95%    BMI 22.53 kg/m2       Intake/Output Summary (Last 24 hours) at 18 1044  Last data filed at 18 1900   Gross per 24 hour   Intake                0 ml   Output             3000 ml   Net            -3000 ml      Telemetry Reviewed:     PHYSICAL EXAM:  General: NAD  No edema      Lab Data Reviewed: (see below)    Medications Reviewed: (see below)    PMH/SH reviewed - no change compared to H&P  ________________________________________________________________________  Care Plan discussed with:  Patient     Family      RN     Care Manager                    Consultant:          Comments   >50% of visit spent in counseling and coordination of care       ________________________________________________________________________  Babara Pines, MD     Procedures: see electronic medical records for all procedures/Xrays and details which  were not copied into this note but were reviewed prior to creation of Plan. LABS:  Recent Labs      09/12/18 0436 09/11/18 0617   WBC  1.6*  2.2*   HGB  11.0*  10.9*   HCT  35.7  35.5   PLT  179  169     Recent Labs      09/12/18   0436  09/11/18   0617  09/10/18   1752   NA  140  136  136   K  4.1  5.3*  5.6*   CL  103  97  98   CO2  27  19*  24   BUN  44*  102*  100*   CREA  6.05*  10.30*  9.71*   GLU  91  91  89   CA  7.7*  8.3*  8.4*   MG  2.1  2.1   --    PHOS  5.4*  8.2*   --      Recent Labs      09/12/18   0436  09/11/18   0617  09/10/18   1752   SGOT   --   27  34   AP   --   69  77   TP   --   7.9  8.7*   ALB  3.2*  3.2*  3.6   GLOB   --   4.7*  5.1*   AML   --   325*   --    LPSE   --   376   --      No results for input(s): INR, PTP, APTT in the last 72 hours. No lab exists for component: INREXT   No results for input(s): FE, TIBC, PSAT, FERR in the last 72 hours. Lab Results   Component Value Date/Time    Folate 13.7 06/17/2018 03:00 PM      No results for input(s): PH, PCO2, PO2 in the last 72 hours.   Recent Labs      09/11/18 0617 09/11/18   0016   CPK  42  48   CKMB  1.9  1.7     No components found for: [place]  Lab Results   Component Value Date/Time    Color YELLOW/STRAW 08/12/2016 09:06 PM    Appearance CLEAR 08/12/2016 09:06 PM    Specific gravity 1.017 08/12/2016 09:06 PM    Specific gravity 1.010 07/11/2016 12:44 PM    pH (UA) 7.0 08/12/2016 09:06 PM    Protein 300 (A) 08/12/2016 09:06 PM    Glucose NEGATIVE  08/12/2016 09:06 PM    Ketone TRACE (A) 08/12/2016 09:06 PM    Bilirubin NEGATIVE  07/11/2016 12:44 PM    Urobilinogen 1.0 08/12/2016 09:06 PM    Nitrites NEGATIVE  08/12/2016 09:06 PM    Leukocyte Esterase NEGATIVE  08/12/2016 09:06 PM    Epithelial cells MODERATE (A) 08/12/2016 09:06 PM    Bacteria 1+ (A) 08/12/2016 09:06 PM    WBC 0-4 08/12/2016 09:06 PM    RBC 0-5 08/12/2016 09:06 PM       MEDICATIONS:  Current Facility-Administered Medications   Medication Dose Route Frequency    losartan (COZAAR) tablet 25 mg  25 mg Oral DAILY    [START ON 9/14/2018] cloNIDine (CATAPRES) 0.3 mg/24 hr patch 1 Patch  1 Patch TransDERmal Q7D    amLODIPine (NORVASC) tablet 10 mg  10 mg Oral DAILY    HYDROcodone-acetaminophen (NORCO) 7.5-325 mg per tablet 1 Tab  1 Tab Oral Q4H PRN    fentaNYL citrate (PF) injection 50 mcg  50 mcg IntraVENous Q4H PRN    ferrous sulfate tablet 325 mg  325 mg Oral TID WITH MEALS    acetaminophen (TYLENOL) tablet 650 mg  650 mg Oral Q4H PRN    amitriptyline (ELAVIL) tablet 25 mg  25 mg Oral QHS    apixaban (ELIQUIS) tablet 5 mg  5 mg Oral BID    azaTHIOprine (IMURAN) tablet 50 mg  50 mg Oral DAILY AFTER BREAKFAST    gemfibrozil (LOPID) tablet 300 mg  300 mg Oral DAILY    hydroxychloroquine (PLAQUENIL) tablet 200 mg  200 mg Oral BID    pantoprazole (PROTONIX) tablet 40 mg  40 mg Oral ACB    polyethylene glycol (MIRALAX) packet 17 g  17 g Oral DAILY PRN    predniSONE (DELTASONE) tablet 10 mg  10 mg Oral DAILY    senna (SENOKOT) tablet 8.6 mg  1 Tab Oral DAILY PRN    sodium chloride (NS) flush 5-10 mL  5-10 mL IntraVENous Q8H    sodium chloride (NS) flush 5-10 mL  5-10 mL IntraVENous PRN    ondansetron (ZOFRAN) injection 4 mg  4 mg IntraVENous Q4H PRN    albuterol-ipratropium (DUO-NEB) 2.5 MG-0.5 MG/3 ML  3 mL Nebulization Q4H PRN    aspirin chewable tablet 81 mg  81 mg Oral DAILY    niCARdipine (CARDENE) 25 mg in 0.9% sodium chloride 250 mL infusion  0-15 mg/hr IntraVENous TITRATE    metoprolol tartrate (LOPRESSOR) tablet 25 mg  25 mg Oral Q6H

## 2018-09-12 NOTE — PROGRESS NOTES
Spiritual Care Partner Volunteer visited patient in Rm 418 on 9/12/2018.   Documented by:  Chaplain Burleson MDiv, MS, [facility]  [phone] [phone])

## 2018-09-12 NOTE — PROGRESS NOTES
Problem: Hypertension  Goal: *Blood pressure within specified parameters  Outcome: Progressing Towards Goal  Pt on RA sats maintained above 90%. Pt on cardene gtt at 2.5 mg. Dr. Chika Bowers resume patient home meds  Amiodarone, losartan and clonidine. 1159: Cardene gtt stopped. Pt bp 132/87. Home BP meds started. 1312: Cardene restarted at 2.5mg, pt bp 151/105. Bedside shift change report given to Anne Marie Albert (oncoming nurse) by Garret Peng RN (offgoing nurse). Report included the following information SBAR.

## 2018-09-12 NOTE — PROGRESS NOTES
Assessment/Plan/Discussion:Cardiology Attending:     Patient seen on the day of progress note and examined  and agree with Advance Practice Provider (ELTON, NP,PA)  assessment and plans. Matias Anders is a 29 y.o. female   Ms Viki Ruby was seen this am around 845 am and feeling tony  She has no chest pain since Monday night  Her troponin came down   The echo is not resulted, we will have to check  If that looks ok then no further cardiac studies and with SLE flare and CKD 5 looks like that is source of elevated troponin, it is not an MI    Romana Drew, MD        Cardiology Progress Note            Admit Date: 9/10/2018  Admit Diagnosis: Malignant hypertensive urgency  Date: 9/12/2018     Time: 11:10 AM    Subjective:  Denies CP and SOB. All over body pain much improved. Assessment and Plan     1. Elevated troponin - Flat   - 0.31 --> 0.26   - EKG without evidence for ACS   - Non specific in this setting of ESRD (Cr 10), Significant HTN (205) and SLE flair   - Echo ordered  2. HTN   - Improved this am   - Lopressor, Cardene gtt  3. ESRD   - Cr 6   - HD   - Nephrology following  4. SLE   - Acute exacerbation   - Prednisone, Plaquenil    Troponin low and decreasing - Demand from ESRD, HTN and SLE. On HD and BP improving. Echo pending. BP improved and no longer with c/o CP. Needs no further cardiac studies at this time. Will see again as needed.       ROS:     GENERAL   Recent weight loss - no   Fever -----------------   no   Chills -----------------   no     EYES, VISION   Visual Changes - no     EARS, NOSE, THROAT   Hearing loss ----------- no   Swallowing difficulties - no     CARDIOVASCULAR   Chest pain/pressure ---- no   Arrhythmia/palpitations - no       RESPIRATORY   Cough ------------------ no   Shortness of breath - no   Wheezing -------------- no   GASTROINTESTINAL   Abdominal pain - no   Heartburn -------- no Bloody stool ----- no     GENITOURINARY   Frequent urination - no   Urgency -------------- no     MUSCULOSKELETAL   Joint pain/swelling ---- no   Musculoskeletal pain - no     SKIN & INTEGUMENTARY   Rashes - no   Sores --- no         NEUROLOGICAL   Numbness/tingling - no   Sensation loss ------ no     PSYCHIATRIC   Nervousness/anxiety - no   Depression -------------- no     ENDOCRINE   Heat/cold intolerance - no   Excessive thirst -------- no     HEMATOLOGIC/LYMPHATIC   Abnormal bleeding - no     ALL/IMMUN   Allergic reaction ------ no   Recurrent infections - no     Objective:     Physical Exam:                Visit Vitals    BP (!) 133/97 (BP 1 Location: Left arm, BP Patient Position: At rest)    Pulse 83    Temp 97.4 °F (36.3 °C)    Resp 24    Ht 5' 5\" (1.651 m)    Wt 135 lb 5.8 oz (61.4 kg)    SpO2 100%    BMI 22.53 kg/m2        General Appearance:   Well developed, well nourished,alert and oriented x 3, and   individual in no acute distress, but looks in pain. Ears/Nose/Mouth/Throat:    Hearing grossly normal.         Neck:  Supple. Chest:    Lungs clear to auscultation bilaterally. Cardiovascular:   Regular rate and rhythm, S1, S2 normal, no murmur. Abdomen:    Soft, non-tender, bowel sounds are active. Extremities:  No edema bilaterally. Skin:  Warm and dry.      Telemetry: normal sinus rhythm          Data Review:    Labs:    Recent Results (from the past 24 hour(s))   CBC W/O DIFF    Collection Time: 09/12/18  4:36 AM   Result Value Ref Range    WBC 1.6 (L) 3.6 - 11.0 K/uL    RBC 3.84 3.80 - 5.20 M/uL    HGB 11.0 (L) 11.5 - 16.0 g/dL    HCT 35.7 35.0 - 47.0 %    MCV 93.0 80.0 - 99.0 FL    MCH 28.6 26.0 - 34.0 PG    MCHC 30.8 30.0 - 36.5 g/dL    RDW 15.2 (H) 11.5 - 14.5 %    PLATELET 287 908 - 171 K/uL    MPV 10.8 8.9 - 12.9 FL    NRBC 0.0 0  WBC    ABSOLUTE NRBC 0.00 0.00 - 0.01 K/uL   RENAL FUNCTION PANEL    Collection Time: 09/12/18  4:36 AM   Result Value Ref Range    Sodium 140 136 - 145 mmol/L    Potassium 4.1 3.5 - 5.1 mmol/L    Chloride 103 97 - 108 mmol/L    CO2 27 21 - 32 mmol/L    Anion gap 10 5 - 15 mmol/L    Glucose 91 65 - 100 mg/dL    BUN 44 (H) 6 - 20 MG/DL    Creatinine 6.05 (H) 0.55 - 1.02 MG/DL    BUN/Creatinine ratio 7 (L) 12 - 20      GFR est AA 10 (L) >60 ml/min/1.73m2    GFR est non-AA 8 (L) >60 ml/min/1.73m2    Calcium 7.7 (L) 8.5 - 10.1 MG/DL    Phosphorus 5.4 (H) 2.6 - 4.7 MG/DL    Albumin 3.2 (L) 3.5 - 5.0 g/dL   MAGNESIUM    Collection Time: 09/12/18  4:36 AM   Result Value Ref Range    Magnesium 2.1 1.6 - 2.4 mg/dL          Radiology:        Current Facility-Administered Medications   Medication Dose Route Frequency    losartan (COZAAR) tablet 25 mg  25 mg Oral DAILY    [START ON 9/14/2018] cloNIDine (CATAPRES) 0.3 mg/24 hr patch 1 Patch  1 Patch TransDERmal Q7D    amLODIPine (NORVASC) tablet 10 mg  10 mg Oral DAILY    HYDROcodone-acetaminophen (NORCO) 7.5-325 mg per tablet 1 Tab  1 Tab Oral Q4H PRN    fentaNYL citrate (PF) injection 50 mcg  50 mcg IntraVENous Q4H PRN    ferrous sulfate tablet 325 mg  325 mg Oral TID WITH MEALS    acetaminophen (TYLENOL) tablet 650 mg  650 mg Oral Q4H PRN    amitriptyline (ELAVIL) tablet 25 mg  25 mg Oral QHS    apixaban (ELIQUIS) tablet 5 mg  5 mg Oral BID    azaTHIOprine (IMURAN) tablet 50 mg  50 mg Oral DAILY AFTER BREAKFAST    gemfibrozil (LOPID) tablet 300 mg  300 mg Oral DAILY    hydroxychloroquine (PLAQUENIL) tablet 200 mg  200 mg Oral BID    pantoprazole (PROTONIX) tablet 40 mg  40 mg Oral ACB    polyethylene glycol (MIRALAX) packet 17 g  17 g Oral DAILY PRN    predniSONE (DELTASONE) tablet 10 mg  10 mg Oral DAILY    senna (SENOKOT) tablet 8.6 mg  1 Tab Oral DAILY PRN    sodium chloride (NS) flush 5-10 mL  5-10 mL IntraVENous Q8H    sodium chloride (NS) flush 5-10 mL  5-10 mL IntraVENous PRN    ondansetron (ZOFRAN) injection 4 mg  4 mg IntraVENous Q4H PRN    albuterol-ipratropium (DUO-NEB) 2.5 MG-0.5 MG/3 ML  3 mL Nebulization Q4H PRN    aspirin chewable tablet 81 mg  81 mg Oral DAILY    niCARdipine (CARDENE) 25 mg in 0.9% sodium chloride 250 mL infusion  0-15 mg/hr IntraVENous TITRATE    metoprolol tartrate (LOPRESSOR) tablet 25 mg  25 mg Oral Q6H       Esteban Zuñiga M.D.      [facility] [address] [place] [address] 13, [address] [facility] [address]   Jerzy Ivy   [phone]

## 2018-09-13 LAB
ALBUMIN SERPL-MCNC: 3.1 G/DL (ref 3.5–5)
ANION GAP SERPL CALC-SCNC: 14 MMOL/L (ref 5–15)
BASOPHILS # BLD: 0 K/UL (ref 0–0.1)
BASOPHILS NFR BLD: 0 % (ref 0–1)
BLASTS NFR BLD MANUAL: 0 %
BUN SERPL-MCNC: 72 MG/DL (ref 6–20)
BUN/CREAT SERPL: 9 (ref 12–20)
CALCIUM SERPL-MCNC: 7.8 MG/DL (ref 8.5–10.1)
CHLORIDE SERPL-SCNC: 98 MMOL/L (ref 97–108)
CO2 SERPL-SCNC: 24 MMOL/L (ref 21–32)
CREAT SERPL-MCNC: 8.16 MG/DL (ref 0.55–1.02)
DIFFERENTIAL METHOD BLD: ABNORMAL
EOSINOPHIL # BLD: 0 K/UL (ref 0–0.4)
EOSINOPHIL NFR BLD: 0 % (ref 0–7)
ERYTHROCYTE [DISTWIDTH] IN BLOOD BY AUTOMATED COUNT: 15 % (ref 11.5–14.5)
GLUCOSE SERPL-MCNC: 93 MG/DL (ref 65–100)
HCT VFR BLD AUTO: 34.4 % (ref 35–47)
HGB BLD-MCNC: 10.3 G/DL (ref 11.5–16)
IMM GRANULOCYTES # BLD: 0 K/UL
IMM GRANULOCYTES NFR BLD AUTO: 0 %
LYMPHOCYTES # BLD: 0.7 K/UL (ref 0.8–3.5)
LYMPHOCYTES NFR BLD: 33 % (ref 12–49)
MCH RBC QN AUTO: 28.4 PG (ref 26–34)
MCHC RBC AUTO-ENTMCNC: 29.9 G/DL (ref 30–36.5)
MCV RBC AUTO: 94.8 FL (ref 80–99)
METAMYELOCYTES NFR BLD MANUAL: 0 %
MONOCYTES # BLD: 0.2 K/UL (ref 0–1)
MONOCYTES NFR BLD: 9 % (ref 5–13)
MYELOCYTES NFR BLD MANUAL: 1 %
NEUTS BAND NFR BLD MANUAL: 0 % (ref 0–6)
NEUTS SEG # BLD: 1.2 K/UL (ref 1.8–8)
NEUTS SEG NFR BLD: 57 % (ref 32–75)
NRBC # BLD: 0 K/UL (ref 0–0.01)
NRBC BLD-RTO: 0 PER 100 WBC
OTHER CELLS NFR BLD MANUAL: 0 %
PHOSPHATE SERPL-MCNC: 6.6 MG/DL (ref 2.6–4.7)
PLATELET # BLD AUTO: 168 K/UL (ref 150–400)
PMV BLD AUTO: 11.1 FL (ref 8.9–12.9)
POTASSIUM SERPL-SCNC: 4.6 MMOL/L (ref 3.5–5.1)
PROMYELOCYTES NFR BLD MANUAL: 0 %
RBC # BLD AUTO: 3.63 M/UL (ref 3.8–5.2)
RBC MORPH BLD: ABNORMAL
RBC MORPH BLD: ABNORMAL
SODIUM SERPL-SCNC: 136 MMOL/L (ref 136–145)
WBC # BLD AUTO: 2.1 K/UL (ref 3.6–11)

## 2018-09-13 PROCEDURE — 80069 RENAL FUNCTION PANEL: CPT | Performed by: INTERNAL MEDICINE

## 2018-09-13 PROCEDURE — 74011636637 HC RX REV CODE- 636/637: Performed by: INTERNAL MEDICINE

## 2018-09-13 PROCEDURE — 85027 COMPLETE CBC AUTOMATED: CPT | Performed by: INTERNAL MEDICINE

## 2018-09-13 PROCEDURE — 65660000000 HC RM CCU STEPDOWN

## 2018-09-13 PROCEDURE — 74011250637 HC RX REV CODE- 250/637: Performed by: SPECIALIST

## 2018-09-13 PROCEDURE — 90935 HEMODIALYSIS ONE EVALUATION: CPT

## 2018-09-13 PROCEDURE — 74011250636 HC RX REV CODE- 250/636: Performed by: INTERNAL MEDICINE

## 2018-09-13 PROCEDURE — 74011250637 HC RX REV CODE- 250/637: Performed by: INTERNAL MEDICINE

## 2018-09-13 PROCEDURE — 36415 COLL VENOUS BLD VENIPUNCTURE: CPT | Performed by: INTERNAL MEDICINE

## 2018-09-13 PROCEDURE — 74011000250 HC RX REV CODE- 250: Performed by: INTERNAL MEDICINE

## 2018-09-13 RX ORDER — HYDROCODONE BITARTRATE AND ACETAMINOPHEN 7.5; 325 MG/1; MG/1
1 TABLET ORAL
Status: DISCONTINUED | OUTPATIENT
Start: 2018-09-13 | End: 2018-09-19 | Stop reason: HOSPADM

## 2018-09-13 RX ORDER — FENTANYL CITRATE 50 UG/ML
25 INJECTION, SOLUTION INTRAMUSCULAR; INTRAVENOUS
Status: DISCONTINUED | OUTPATIENT
Start: 2018-09-13 | End: 2018-09-15

## 2018-09-13 RX ORDER — LOSARTAN POTASSIUM 25 MG/1
25 TABLET ORAL ONCE
Status: COMPLETED | OUTPATIENT
Start: 2018-09-13 | End: 2018-09-13

## 2018-09-13 RX ORDER — LOSARTAN POTASSIUM 50 MG/1
50 TABLET ORAL DAILY
Status: DISCONTINUED | OUTPATIENT
Start: 2018-09-14 | End: 2018-09-14

## 2018-09-13 RX ORDER — ACETAMINOPHEN 325 MG/1
650 TABLET ORAL
Status: DISCONTINUED | OUTPATIENT
Start: 2018-09-13 | End: 2018-09-19 | Stop reason: HOSPADM

## 2018-09-13 RX ADMIN — ASPIRIN 81 MG CHEWABLE TABLET 81 MG: 81 TABLET CHEWABLE at 08:34

## 2018-09-13 RX ADMIN — PREDNISONE 10 MG: 5 TABLET ORAL at 08:34

## 2018-09-13 RX ADMIN — LOSARTAN POTASSIUM 25 MG: 25 TABLET ORAL at 11:46

## 2018-09-13 RX ADMIN — HYDROCODONE BITARTRATE AND ACETAMINOPHEN 1 TABLET: 7.5; 325 TABLET ORAL at 00:26

## 2018-09-13 RX ADMIN — GEMFIBROZIL 300 MG: 600 TABLET ORAL at 08:34

## 2018-09-13 RX ADMIN — METOPROLOL TARTRATE 25 MG: 25 TABLET ORAL at 18:10

## 2018-09-13 RX ADMIN — LOSARTAN POTASSIUM 25 MG: 25 TABLET ORAL at 08:33

## 2018-09-13 RX ADMIN — FERRIC CITRATE 210 MG: 210 TABLET, COATED ORAL at 08:34

## 2018-09-13 RX ADMIN — APIXABAN 5 MG: 5 TABLET, FILM COATED ORAL at 08:34

## 2018-09-13 RX ADMIN — FENTANYL CITRATE 50 MCG: 50 INJECTION, SOLUTION INTRAMUSCULAR; INTRAVENOUS at 02:11

## 2018-09-13 RX ADMIN — FERRIC CITRATE 210 MG: 210 TABLET, COATED ORAL at 13:21

## 2018-09-13 RX ADMIN — FENTANYL CITRATE 50 MCG: 50 INJECTION, SOLUTION INTRAMUSCULAR; INTRAVENOUS at 11:46

## 2018-09-13 RX ADMIN — AMLODIPINE BESYLATE 10 MG: 5 TABLET ORAL at 08:33

## 2018-09-13 RX ADMIN — AMITRIPTYLINE HYDROCHLORIDE 25 MG: 50 TABLET, FILM COATED ORAL at 23:25

## 2018-09-13 RX ADMIN — FENTANYL CITRATE 50 MCG: 50 INJECTION, SOLUTION INTRAMUSCULAR; INTRAVENOUS at 07:09

## 2018-09-13 RX ADMIN — METOPROLOL TARTRATE 25 MG: 25 TABLET ORAL at 06:33

## 2018-09-13 RX ADMIN — HYDROXYCHLOROQUINE SULFATE 200 MG: 200 TABLET, FILM COATED ORAL at 18:10

## 2018-09-13 RX ADMIN — AZATHIOPRINE 50 MG: 50 TABLET ORAL at 09:41

## 2018-09-13 RX ADMIN — FENTANYL CITRATE 25 MCG: 50 INJECTION, SOLUTION INTRAMUSCULAR; INTRAVENOUS at 18:17

## 2018-09-13 RX ADMIN — Medication 10 ML: at 22:00

## 2018-09-13 RX ADMIN — Medication 10 ML: at 14:00

## 2018-09-13 RX ADMIN — Medication 10 ML: at 06:00

## 2018-09-13 RX ADMIN — PANTOPRAZOLE SODIUM 40 MG: 40 TABLET, DELAYED RELEASE ORAL at 06:33

## 2018-09-13 RX ADMIN — SODIUM CHLORIDE 2.5 MG/HR: 900 INJECTION, SOLUTION INTRAVENOUS at 10:41

## 2018-09-13 RX ADMIN — HYDROCODONE BITARTRATE AND ACETAMINOPHEN 1 TABLET: 7.5; 325 TABLET ORAL at 20:46

## 2018-09-13 RX ADMIN — METOPROLOL TARTRATE 25 MG: 25 TABLET ORAL at 11:46

## 2018-09-13 RX ADMIN — FERRIC CITRATE 210 MG: 210 TABLET, COATED ORAL at 19:14

## 2018-09-13 RX ADMIN — HYDROXYCHLOROQUINE SULFATE 200 MG: 200 TABLET, FILM COATED ORAL at 08:34

## 2018-09-13 RX ADMIN — METOPROLOL TARTRATE 25 MG: 25 TABLET ORAL at 00:23

## 2018-09-13 RX ADMIN — METOPROLOL TARTRATE 25 MG: 25 TABLET ORAL at 23:25

## 2018-09-13 RX ADMIN — APIXABAN 5 MG: 5 TABLET, FILM COATED ORAL at 18:10

## 2018-09-13 NOTE — ADVANCED PRACTICE NURSE
Patient: Jeffery Kiran : 1985 Date: 18 Echo results:  
 - EF 50-55%. [address] [place]. Wall thickness was mild to moderately increased 
 - mild to moderate MVR 
 - Mild TR 
 - No pericardial effusion No change in echo from 18. Normal study She need no further cardiac testing at this time Will see again as needed.  
 
Tanna Presley PA-C

## 2018-09-13 NOTE — DIALYSIS
[facility]  [phone]    Vitals   Pre   Post   Assessment   Pre   Post     Temp  98.1  98.0 LOC  A&O x3 A&O x3   HR   79 85 Lungs   clear  clear   B/P  128/92 136/101 Cardiac   regular  regular   Resp   18 18 Skin   Dry, warm  dry, warm   Pain level  0 0 Edema  none     none   Orders:    Duration:   Start:    1350 End:    1720 Total:   3.5 hrs   Dialyzer:   Dialyzer/Set Up Inspection: Mo ([id number]) (09/13/18 1345)   K Bath:   Dialysate K (mEq/L): 2 (09/13/18 1345)   Ca Bath:   Dialysate CA (mEq/L): 2.5 (09/13/18 1345)   Na/Bicarb:   Dialysate NA (mEq/L): 140 (09/13/18 1345)   Target Fluid Removal:   Goal/Amount of Fluid to Remove (mL): 3000 mL (09/13/18 1345)   Access     Type & Location:   PETER AVG: +B&T , no S&S of infection, site cleaned with alcohol, cannulated with 15 G 1\" needles x2, +flashes/aspir/NS flushes   Labs     Obtained/Reviewed   Critical Results Called   Date when labs were drawn-  Hgb-    HGB   Date Value Ref Range Status   09/13/2018 10.3 (L) 11.5 - 16.0 g/dL Final     K-    Potassium   Date Value Ref Range Status   09/13/2018 4.6 3.5 - 5.1 mmol/L Final     Ca-   Calcium   Date Value Ref Range Status   09/13/2018 7.8 (L) 8.5 - 10.1 MG/DL Final     Bun-   BUN   Date Value Ref Range Status   09/13/2018 72 (H) 6 - 20 MG/DL Final     Comment:     INVESTIGATED PER DELTA CHECK PROTOCOL     Creat-   Creatinine   Date Value Ref Range Status   09/13/2018 8.16 (H) 0.55 - 1.02 MG/DL Final     Comment:     INVESTIGATED PER DELTA CHECK PROTOCOL        Medications/ Blood Products Given     Name   Dose   Route and Time           none          Blood Volume Processed (BVP):    68 L Net Fluid   Removed:  3000 cc   Comments   Time Out Done: 3550  Primary Nurse Rpt Pre: Roma Runner, RN  Primary Nurse Rpt Post: Suyapa Runner, RN  Pt Education: access care, procedure  Care Plan: continue HD tx as per MD order  Tx Summary: tolerated tx well, at the end remaining blood in circuit returned with 300 cc NS, cannulas removed x2, pressure held until hemostasis obtained, about 8 min on each site, +B&T still noted, dressing applied. Admiting Diagnosis:  Pt's previous clinic-  Consent signed - Informed Consent Verified: Yes (09/13/18 1345)  [facility] Consent - yes  Hepatitis Status-  Hep B Ag neg 06/12/18                               Hep B Ab  16  06/13/18  Machine #- Machine Number: [id number] (09/13/18 1345)  Telemetry status- monitored at the bedside  Pre-dialysis wt. - N/A

## 2018-09-13 NOTE — PROGRESS NOTES
Hospitalist Progress Note  Kai Walker MD  Answering service: [phone] OR [phone] from in house phone      Date of Service:  2018  NAME:  Alton Guadarrama  :  1985  MRN:  [id number]      Admission Summary:   30 yo woman with dyslipidemia, h/o pulmonary embolism, ESRD on TTS HD, chronic pain syndrome, asthma, systemic lupus erythematosus presented to the ED from home on 9/10/18 with progressive fatigue, generalized body aches, abdominal pain, chest pain. She was admitted to [facility] with hypertensive urgency.      Interval history / Subjective:   Feels better, pain improved; home BP meds resumed and nicardipine gtt was stopped but BP still elevated so nicardipine resumed; seen and d/w nurse     Assessment & Plan:     Hypertensive urgency (POA) - back on nicardipine gtt and home meds resumed  - keep in IMCU  - CT head wo contrast 9/10 unremarkable  - pt reports very difficult to control BP despite medication adherence; will need adjustments to her home medication at the time of discharge    Elevated troponin with CP (POA) - nonspecific with ESRD and ECG without evidence of ACS  - Cards following  - TTE  ED 50-55%, no RWMA, increased LV wall thickness, mild-mod MR, mild TR  - continue BB    Dyslipidemia - LDL 60 but ; resumed home gemfibrozil    Chronic PE - continue apixaban    ESRD on HD - TTS HD as per Renal    Hyperkalemia - likely due to ESRD; kayexalate prn    Leukopenia (POA) - patient is asymptomatic     Chronic pain syndrome and chronic back pain - pain control    SLE with exacerbation (POA) - continue home Plaquenil, prednisone, azathioprine  - pain control    Asthma - controlled, nebs prn    Code status: full  DVT prophylaxis: SCDs    Care Plan discussed with: Patient/Family and Nurse  Disposition: TBD     Hospital Problems  Date Reviewed: 9/10/2018          Codes Class Noted POA    * (Principal)Malignant hypertensive urgency ICD-10-CM: I16.0  ICD-9-CM: 401.0  9/10/2018 Yes                Review of Systems:   Pertinent items are noted in HPI. Vital Signs:    Last 24hrs VS reviewed since prior progress note.  Most recent are:  Visit Vitals    BP (!) 146/104 (BP 1 Location: Left arm, BP Patient Position: At rest)    Pulse 79    Temp 97.8 °F (36.6 °C)    Resp 25    Ht 5' 5\" (1.651 m)    Wt 61.4 kg (135 lb 5.8 oz)    SpO2 99%    BMI 22.53 kg/m2         Intake/Output Summary (Last 24 hours) at 09/12/18 2059  Last data filed at 09/12/18 1200   Gross per 24 hour   Intake              660 ml   Output                0 ml   Net              660 ml        Physical Examination:     Constitutional:  awake, no acute distress, cooperative, pleasant    ENT:  oral mucosa moist, oropharynx benign    Resp:  diminished BS   CV:  regular rhythm, normal rate, no m/r/g appreciated, RUE and b/l LE edema, RUE AVG +thrill/bruit    GI:  +BS, soft, non distended, NT    Musculoskeletal:  HENDRICKSON    Neurologic:  AAOx3, NFD                                             Skin:  warm, dry; multiple healed abdominal surgical scars      Data Review:    Review and/or order of clinical lab test  Review and/or order of tests in the radiology section of CPT  Review and/or order of tests in the medicine section of CPT      Labs:     Recent Labs      09/12/18 0436 09/11/18 0617   WBC  1.6*  2.2*   HGB  11.0*  10.9*   HCT  35.7  35.5   PLT  179  169     Recent Labs      09/12/18   0436  09/11/18   0617  09/10/18   1752   NA  140  136  136   K  4.1  5.3*  5.6*   CL  103  97  98   CO2  27  19*  24   BUN  44*  102*  100*   CREA  6.05*  10.30*  9.71*   GLU  91  91  89   CA  7.7*  8.3*  8.4*   MG  2.1  2.1   --    PHOS  5.4*  8.2*   --      Recent Labs      09/12/18 0436 09/11/18   0617  09/10/18   1752   SGOT   --   27  34   ALT   --   23  23   AP   --   69  77   TBILI   --   0.5  0.4   TP   --   7.9  8.7*   ALB  3.2*  3.2*  3.6   GLOB   --   4.7*  5.1*   AML --   325*   --    LPSE   --   376   --      No results for input(s): INR, PTP, APTT in the last 72 hours. No lab exists for component: INREXT, INREXT   No results for input(s): FE, TIBC, PSAT, FERR in the last 72 hours. Lab Results   Component Value Date/Time    Folate 13.7 06/17/2018 03:00 PM      No results for input(s): PH, PCO2, PO2 in the last 72 hours.   Recent Labs      09/11/18   0617  09/11/18   0016  09/10/18   1752   CPK  42  48   --    CKNDX  4.5*  3.5*   --    TROIQ  0.26*  0.31*  0.36*     Lab Results   Component Value Date/Time    Cholesterol, total 140 09/11/2018 06:17 AM    HDL Cholesterol 43 09/11/2018 06:17 AM    LDL, calculated 60.4 09/11/2018 06:17 AM    Triglyceride 183 (H) 09/11/2018 06:17 AM    CHOL/HDL Ratio 3.3 09/11/2018 06:17 AM     Lab Results   Component Value Date/Time    Glucose (POC) 104 (H) 08/16/2018 11:06 AM    Glucose (POC) 164 (H) 07/22/2018 11:43 AM    Glucose (POC) 138 (H) 07/21/2018 09:22 PM    Glucose (POC) 111 (H) 07/21/2018 04:57 PM    Glucose (POC) 105 (H) 07/21/2018 11:38 AM     Lab Results   Component Value Date/Time    Color YELLOW/STRAW 08/12/2016 09:06 PM    Appearance CLEAR 08/12/2016 09:06 PM    Specific gravity 1.017 08/12/2016 09:06 PM    Specific gravity 1.010 07/11/2016 12:44 PM    pH (UA) 7.0 08/12/2016 09:06 PM    Protein 300 (A) 08/12/2016 09:06 PM    Glucose NEGATIVE  08/12/2016 09:06 PM    Ketone TRACE (A) 08/12/2016 09:06 PM    Bilirubin NEGATIVE  07/11/2016 12:44 PM    Urobilinogen 1.0 08/12/2016 09:06 PM    Nitrites NEGATIVE  08/12/2016 09:06 PM    Leukocyte Esterase NEGATIVE  08/12/2016 09:06 PM    Epithelial cells MODERATE (A) 08/12/2016 09:06 PM    Bacteria 1+ (A) 08/12/2016 09:06 PM    WBC 0-4 08/12/2016 09:06 PM    RBC 0-5 08/12/2016 09:06 PM         Medications Reviewed:     Current Facility-Administered Medications   Medication Dose Route Frequency    losartan (COZAAR) tablet 25 mg  25 mg Oral DAILY    [START ON 9/14/2018] cloNIDine (CATAPRES) 0.3 mg/24 hr patch 1 Patch  1 Patch TransDERmal Q7D    amLODIPine (NORVASC) tablet 10 mg  10 mg Oral DAILY    ferric citrate (AURYXIA) tablet 210 mg  210 mg Oral TID WITH MEALS    HYDROcodone-acetaminophen (NORCO) 7.5-325 mg per tablet 1 Tab  1 Tab Oral Q4H PRN    fentaNYL citrate (PF) injection 50 mcg  50 mcg IntraVENous Q4H PRN    acetaminophen (TYLENOL) tablet 650 mg  650 mg Oral Q4H PRN    amitriptyline (ELAVIL) tablet 25 mg  25 mg Oral QHS    apixaban (ELIQUIS) tablet 5 mg  5 mg Oral BID    azaTHIOprine (IMURAN) tablet 50 mg  50 mg Oral DAILY AFTER BREAKFAST    gemfibrozil (LOPID) tablet 300 mg  300 mg Oral DAILY    hydroxychloroquine (PLAQUENIL) tablet 200 mg  200 mg Oral BID    pantoprazole (PROTONIX) tablet 40 mg  40 mg Oral ACB    polyethylene glycol (MIRALAX) packet 17 g  17 g Oral DAILY PRN    predniSONE (DELTASONE) tablet 10 mg  10 mg Oral DAILY    senna (SENOKOT) tablet 8.6 mg  1 Tab Oral DAILY PRN    sodium chloride (NS) flush 5-10 mL  5-10 mL IntraVENous Q8H    sodium chloride (NS) flush 5-10 mL  5-10 mL IntraVENous PRN    ondansetron (ZOFRAN) injection 4 mg  4 mg IntraVENous Q4H PRN    albuterol-ipratropium (DUO-NEB) 2.5 MG-0.5 MG/3 ML  3 mL Nebulization Q4H PRN    aspirin chewable tablet 81 mg  81 mg Oral DAILY    niCARdipine (CARDENE) 25 mg in 0.9% sodium chloride 250 mL infusion  0-15 mg/hr IntraVENous TITRATE    metoprolol tartrate (LOPRESSOR) tablet 25 mg  25 mg Oral Q6H     ______________________________________________________________________  EXPECTED LENGTH OF STAY: 2d 2h  ACTUAL LENGTH OF STAY:          2                 Deon Moran MD

## 2018-09-13 NOTE — PROGRESS NOTES
NAME: Venus Box        :  1985        MRN:  [id number]        Assessment :    Plan:  --[id number]-[place]  Anemia  CP --Hd today. Pull fluid as tolerated. Continue home meds:  Losartan, amlodipine. TTS-3 q Friday. Subjective:     Chief Complaint:  \" I feel great. \"  No CP. No N/V. No dyspnea. Review of Systems:    Symptom Y/N Comments  Symptom Y/N Comments   Fever/Chills    Chest Pain     Poor Appetite    Edema     Cough    Abdominal Pain     Sputum    Joint Pain     SOB/IVY    Pruritis/Rash     Nausea/vomit    Tolerating PT/OT     Diarrhea    Tolerating Diet     Constipation    Other       Could not obtain due to:      Objective:     VITALS:   Last 24hrs VS reviewed since prior progress note. Most recent are:  Visit Vitals    BP (!) 141/98    Pulse 81    Temp 97.3 °F (36.3 °C)    Resp 21    Ht 5' 5\" (1.651 m)    Wt 64.3 kg (141 lb 12.1 oz)    SpO2 94%    BMI 23.59 kg/m2       Intake/Output Summary (Last 24 hours) at 18 1039  Last data filed at 18 0942   Gross per 24 hour   Intake              600 ml   Output                0 ml   Net              600 ml      Telemetry Reviewed:     PHYSICAL EXAM:  General: NAD  No edema      Lab Data Reviewed: (see below)    Medications Reviewed: (see below)    PMH/SH reviewed - no change compared to H&P  ________________________________________________________________________  Care Plan discussed with:  Patient     Family      RN     Care Manager                    Consultant:          Comments   >50% of visit spent in counseling and coordination of care       ________________________________________________________________________  Mohan Jeffers MD     Procedures: see electronic medical records for all procedures/Xrays and details which  were not copied into this note but were reviewed prior to creation of Plan.       LABS:  Recent Labs      09/13/18 0717 09/12/18 0436   WBC  2.1*  1.6*   HGB  10.3*  11.0*   HCT  34.4*  35.7   PLT  168  179     Recent Labs      09/13/18 0717 09/12/18 0436 09/11/18 0617   NA  136  140  136   K  4.6  4.1  5.3*   CL  98  103  97   CO2  24  27  19*   BUN  72*  44*  102*   CREA  8.16*  6.05*  10.30*   GLU  93  91  91   CA  7.8*  7.7*  8.3*   MG   --   2.1  2.1   PHOS  6.6*  5.4*  8.2*     Recent Labs      09/13/18   0717  09/12/18   0436  09/11/18   0617  09/10/18   1752   SGOT   --    --   27  34   AP   --    --   69  77   TP   --    --   7.9  8.7*   ALB  3.1*  3.2*  3.2*  3.6   GLOB   --    --   4.7*  5.1*   AML   --    --   325*   --    LPSE   --    --   376   --      No results for input(s): INR, PTP, APTT in the last 72 hours. No lab exists for component: INREXT, INREXT   No results for input(s): FE, TIBC, PSAT, FERR in the last 72 hours. Lab Results   Component Value Date/Time    Folate 13.7 06/17/2018 03:00 PM      No results for input(s): PH, PCO2, PO2 in the last 72 hours.   Recent Labs      09/11/18 0617 09/11/18   0016   CPK  42  48   CKMB  1.9  1.7     No components found for: [place]  Lab Results   Component Value Date/Time    Color YELLOW/STRAW 08/12/2016 09:06 PM    Appearance CLEAR 08/12/2016 09:06 PM    Specific gravity 1.017 08/12/2016 09:06 PM    Specific gravity 1.010 07/11/2016 12:44 PM    pH (UA) 7.0 08/12/2016 09:06 PM    Protein 300 (A) 08/12/2016 09:06 PM    Glucose NEGATIVE  08/12/2016 09:06 PM    Ketone TRACE (A) 08/12/2016 09:06 PM    Bilirubin NEGATIVE  07/11/2016 12:44 PM    Urobilinogen 1.0 08/12/2016 09:06 PM    Nitrites NEGATIVE  08/12/2016 09:06 PM    Leukocyte Esterase NEGATIVE  08/12/2016 09:06 PM    Epithelial cells MODERATE (A) 08/12/2016 09:06 PM    Bacteria 1+ (A) 08/12/2016 09:06 PM    WBC 0-4 08/12/2016 09:06 PM    RBC 0-5 08/12/2016 09:06 PM       MEDICATIONS:  Current Facility-Administered Medications   Medication Dose Route Frequency    losartan (COZAAR) tablet 25 mg  25 mg Oral DAILY    [START ON 9/14/2018] cloNIDine (CATAPRES) 0.3 mg/24 hr patch 1 Patch  1 Patch TransDERmal Q7D    amLODIPine (NORVASC) tablet 10 mg  10 mg Oral DAILY    ferric citrate (AURYXIA) tablet 210 mg  210 mg Oral TID WITH MEALS    HYDROcodone-acetaminophen (NORCO) 7.5-325 mg per tablet 1 Tab  1 Tab Oral Q4H PRN    fentaNYL citrate (PF) injection 50 mcg  50 mcg IntraVENous Q4H PRN    acetaminophen (TYLENOL) tablet 650 mg  650 mg Oral Q4H PRN    amitriptyline (ELAVIL) tablet 25 mg  25 mg Oral QHS    apixaban (ELIQUIS) tablet 5 mg  5 mg Oral BID    azaTHIOprine (IMURAN) tablet 50 mg  50 mg Oral DAILY AFTER BREAKFAST    gemfibrozil (LOPID) tablet 300 mg  300 mg Oral DAILY    hydroxychloroquine (PLAQUENIL) tablet 200 mg  200 mg Oral BID    pantoprazole (PROTONIX) tablet 40 mg  40 mg Oral ACB    polyethylene glycol (MIRALAX) packet 17 g  17 g Oral DAILY PRN    predniSONE (DELTASONE) tablet 10 mg  10 mg Oral DAILY    senna (SENOKOT) tablet 8.6 mg  1 Tab Oral DAILY PRN    sodium chloride (NS) flush 5-10 mL  5-10 mL IntraVENous Q8H    sodium chloride (NS) flush 5-10 mL  5-10 mL IntraVENous PRN    ondansetron (ZOFRAN) injection 4 mg  4 mg IntraVENous Q4H PRN    albuterol-ipratropium (DUO-NEB) 2.5 MG-0.5 MG/3 ML  3 mL Nebulization Q4H PRN    aspirin chewable tablet 81 mg  81 mg Oral DAILY    niCARdipine (CARDENE) 25 mg in 0.9% sodium chloride 250 mL infusion  0-15 mg/hr IntraVENous TITRATE    metoprolol tartrate (LOPRESSOR) tablet 25 mg  25 mg Oral Q6H

## 2018-09-13 NOTE — PROGRESS NOTES
Problem: Hypertension  Goal: *Blood pressure within specified parameters  Outcome: Progressing Towards Goal  Pt on cardene drip at 2.5 mg/hr this AM. Pts blood pressure 160/110. Pt educated on use of cardene drip for hypertension management. Pt verbalized understanding. 1200- Pts blood pressure 124/92, verbal orders received from Dr. Edel Greenwood to give pt scheduled oral antihypertensives including losartan and metoprolol and then turn off cardene drip 30 minutes after. Pt educated on plan of care. Bedside shift change report given to Karyle Patient, RN (oncoming nurse) by Binu Brumfield RN (offgoing nurse). Report included the following information SBAR, Kardex, ED Summary, Procedure Summary, Intake/Output, MAR, Accordion, Recent Results, Med Rec Status, Cardiac Rhythm NSR and Alarm Parameters .

## 2018-09-13 NOTE — PROGRESS NOTES
0330 - 6 lb weight gain noted. Pt weighed on standing scale on both 9/12 and 9/13. Pt reports weight fluctuations of 3-4 lbs when weighed at outpt dialysis center. Pt to have dialysis today. Will address with day RN. 0800 - Bedside and Verbal shift change report given to Mary Ann (oncoming nurse) by Omar Gonzalez (offgoing nurse). Report included the following information SBAR, Kardex, ED Summary, Procedure Summary, Intake/Output, MAR, Accordion, Recent Results, Med Rec Status and Cardiac Rhythm NSR. Problem: Falls - Risk of  Goal: *Absence of Falls  Document Alex Fall Risk and appropriate interventions in the flowsheet. Outcome: Progressing Towards Goal  Fall Risk Interventions:            Medication Interventions: Evaluate medications/consider consulting pharmacy, Teach patient to arise slowly    Elimination Interventions: Call light in reach, Patient to call for help with toileting needs             Problem: Hypertension  Goal: *Blood pressure within specified parameters  Outcome: Progressing Towards Goal  Pt still on cardene gtt @ 2.5 mg/hr. BP's improving, last /100.

## 2018-09-13 NOTE — PROGRESS NOTES
Hospitalist Progress Note  Angeles Hugo MD  Answering service: [phone] OR [phone] from in house phone      Date of Service:  2018  NAME:  Viet Corbin  :  1985  MRN:  [id number]      Admission Summary:   28 yo woman with dyslipidemia, h/o pulmonary embolism, ESRD on TTS HD, chronic pain syndrome, asthma, systemic lupus erythematosus presented to the ED from home on 9/10/18 with progressive fatigue, generalized body aches, abdominal pain, chest pain. She was admitted to [facility] with hypertensive urgency.      Interval history / Subjective:   Feels better, pain improving; still on nicardipine gtt  still elevated so nicardipine resumed; seen and d/w nurse     Assessment & Plan:     Hypertensive urgency (POA) - back on nicardipine gtt and home meds resumed  - keep in IMCU  - CT head wo contrast 9/10 unremarkable  - pt reports very difficult to control BP despite medication adherence; will need adjustments to her home medication at the time of discharge    Elevated troponin with CP (POA) - nonspecific with ESRD and ECG without evidence of ACS  - Cards following  - TTE  ED 50-55%, no RWMA, increased LV wall thickness, mild-mod MR, mild TR  - continue BB    Dyslipidemia - LDL 60 but ; resumed home gemfibrozil    Chronic PE - continue apixaban    ESRD on HD - TTS HD as per Renal    Hyperkalemia - likely due to ESRD; kayexalate prn    Leukopenia (POA) - patient is asymptomatic     Chronic pain syndrome and chronic back pain - pain control  - try to wean off IV opiate    SLE with exacerbation (POA) - continue home Plaquenil, prednisone, azathioprine  - pain control    Asthma - controlled, nebs prn    Code status: full  DVT prophylaxis: Asim Parker discussed with: Patient/Family, Nurse and   Disposition: TBD     Hospital Problems  Date Reviewed: 9/10/2018          Codes Class Noted POA    * (Principal)Malignant hypertensive urgency ICD-10-CM: I16.0  ICD-9-CM: 401.0  9/10/2018 Yes                Review of Systems:   Pertinent items are noted in HPI. Vital Signs:    Last 24hrs VS reviewed since prior progress note.  Most recent are:  Visit Vitals    BP (!) 135/107    Pulse 84    Temp 97.3 °F (36.3 °C)    Resp 21    Ht 5' 5\" (1.651 m)    Wt 64.3 kg (141 lb 12.1 oz)    SpO2 94%    BMI 23.59 kg/m2         Intake/Output Summary (Last 24 hours) at 09/13/18 1136  Last data filed at 09/13/18 0942   Gross per 24 hour   Intake              600 ml   Output                0 ml   Net              600 ml        Physical Examination:     Constitutional:  awake, no acute distress, cooperative, pleasant    ENT:  oral mucosa moist, oropharynx benign    Resp:  diminished BS   CV:  regular rhythm, normal rate, no m/r/g appreciated, RUE and b/l LE edema, RUE AVG +thrill/bruit    GI:  +BS, soft, non distended, NT    Musculoskeletal:  HENDRICKSON    Neurologic:  AAOx3, NFD                                             Skin:  warm, dry; multiple healed abdominal surgical scars      Data Review:    Review and/or order of clinical lab test  Review and/or order of tests in the radiology section of CPT  Review and/or order of tests in the medicine section of CPT      Labs:     Recent Labs      09/13/18 0717 09/12/18 0436   WBC  2.1*  1.6*   HGB  10.3*  11.0*   HCT  34.4*  35.7   PLT  168  179     Recent Labs      09/13/18 0717 09/12/18 0436 09/11/18 0617   NA  136  140  136   K  4.6  4.1  5.3*   CL  98  103  97   CO2  24  27  19*   BUN  72*  44*  102*   CREA  8.16*  6.05*  10.30*   GLU  93  91  91   CA  7.8*  7.7*  8.3*   MG   --   2.1  2.1   PHOS  6.6*  5.4*  8.2*     Recent Labs      09/13/18 0717 09/12/18 0436 09/11/18 0617  09/10/18   1752   SGOT   --    --   27  34   ALT   --    --   23  23   AP   --    --   69  77   TBILI   --    --   0.5  0.4   TP   --    --   7.9  8.7*   ALB  3.1*  3.2*  3.2*  3.6   GLOB   --    -- 4. 7*  5.1*   AML   --    --   325*   --    LPSE   --    --   376   --      No results for input(s): INR, PTP, APTT in the last 72 hours. No lab exists for component: INREXT, INREXT   No results for input(s): FE, TIBC, PSAT, FERR in the last 72 hours. Lab Results   Component Value Date/Time    Folate 13.7 06/17/2018 03:00 PM      No results for input(s): PH, PCO2, PO2 in the last 72 hours.   Recent Labs      09/11/18   0617  09/11/18   0016  09/10/18   1752   CPK  42  48   --    CKNDX  4.5*  3.5*   --    TROIQ  0.26*  0.31*  0.36*     Lab Results   Component Value Date/Time    Cholesterol, total 140 09/11/2018 06:17 AM    HDL Cholesterol 43 09/11/2018 06:17 AM    LDL, calculated 60.4 09/11/2018 06:17 AM    Triglyceride 183 (H) 09/11/2018 06:17 AM    CHOL/HDL Ratio 3.3 09/11/2018 06:17 AM     Lab Results   Component Value Date/Time    Glucose (POC) 104 (H) 08/16/2018 11:06 AM    Glucose (POC) 164 (H) 07/22/2018 11:43 AM    Glucose (POC) 138 (H) 07/21/2018 09:22 PM    Glucose (POC) 111 (H) 07/21/2018 04:57 PM    Glucose (POC) 105 (H) 07/21/2018 11:38 AM     Lab Results   Component Value Date/Time    Color YELLOW/STRAW 08/12/2016 09:06 PM    Appearance CLEAR 08/12/2016 09:06 PM    Specific gravity 1.017 08/12/2016 09:06 PM    Specific gravity 1.010 07/11/2016 12:44 PM    pH (UA) 7.0 08/12/2016 09:06 PM    Protein 300 (A) 08/12/2016 09:06 PM    Glucose NEGATIVE  08/12/2016 09:06 PM    Ketone TRACE (A) 08/12/2016 09:06 PM    Bilirubin NEGATIVE  07/11/2016 12:44 PM    Urobilinogen 1.0 08/12/2016 09:06 PM    Nitrites NEGATIVE  08/12/2016 09:06 PM    Leukocyte Esterase NEGATIVE  08/12/2016 09:06 PM    Epithelial cells MODERATE (A) 08/12/2016 09:06 PM    Bacteria 1+ (A) 08/12/2016 09:06 PM    WBC 0-4 08/12/2016 09:06 PM    RBC 0-5 08/12/2016 09:06 PM         Medications Reviewed:     Current Facility-Administered Medications   Medication Dose Route Frequency    [START ON 9/14/2018] losartan (COZAAR) tablet 50 mg  50 mg Oral DAILY    losartan (COZAAR) tablet 25 mg  25 mg Oral ONCE    [START ON 9/14/2018] cloNIDine (CATAPRES) 0.3 mg/24 hr patch 1 Patch  1 Patch TransDERmal Q7D    amLODIPine (NORVASC) tablet 10 mg  10 mg Oral DAILY    ferric citrate (AURYXIA) tablet 210 mg  210 mg Oral TID WITH MEALS    HYDROcodone-acetaminophen (NORCO) 7.5-325 mg per tablet 1 Tab  1 Tab Oral Q4H PRN    fentaNYL citrate (PF) injection 50 mcg  50 mcg IntraVENous Q4H PRN    acetaminophen (TYLENOL) tablet 650 mg  650 mg Oral Q4H PRN    amitriptyline (ELAVIL) tablet 25 mg  25 mg Oral QHS    apixaban (ELIQUIS) tablet 5 mg  5 mg Oral BID    azaTHIOprine (IMURAN) tablet 50 mg  50 mg Oral DAILY AFTER BREAKFAST    gemfibrozil (LOPID) tablet 300 mg  300 mg Oral DAILY    hydroxychloroquine (PLAQUENIL) tablet 200 mg  200 mg Oral BID    pantoprazole (PROTONIX) tablet 40 mg  40 mg Oral ACB    polyethylene glycol (MIRALAX) packet 17 g  17 g Oral DAILY PRN    predniSONE (DELTASONE) tablet 10 mg  10 mg Oral DAILY    senna (SENOKOT) tablet 8.6 mg  1 Tab Oral DAILY PRN    sodium chloride (NS) flush 5-10 mL  5-10 mL IntraVENous Q8H    sodium chloride (NS) flush 5-10 mL  5-10 mL IntraVENous PRN    ondansetron (ZOFRAN) injection 4 mg  4 mg IntraVENous Q4H PRN    albuterol-ipratropium (DUO-NEB) 2.5 MG-0.5 MG/3 ML  3 mL Nebulization Q4H PRN    aspirin chewable tablet 81 mg  81 mg Oral DAILY    niCARdipine (CARDENE) 25 mg in 0.9% sodium chloride 250 mL infusion  0-15 mg/hr IntraVENous TITRATE    metoprolol tartrate (LOPRESSOR) tablet 25 mg  25 mg Oral Q6H     ______________________________________________________________________  EXPECTED LENGTH OF STAY: 2d 2h  ACTUAL LENGTH OF STAY:          3                 Kai Ports, MD

## 2018-09-14 LAB
ANION GAP SERPL CALC-SCNC: 12 MMOL/L (ref 5–15)
BUN SERPL-MCNC: 50 MG/DL (ref 6–20)
BUN/CREAT SERPL: 8 (ref 12–20)
CALCIUM SERPL-MCNC: 8 MG/DL (ref 8.5–10.1)
CHLORIDE SERPL-SCNC: 100 MMOL/L (ref 97–108)
CO2 SERPL-SCNC: 28 MMOL/L (ref 21–32)
CREAT SERPL-MCNC: 6.03 MG/DL (ref 0.55–1.02)
GLUCOSE SERPL-MCNC: 89 MG/DL (ref 65–100)
POTASSIUM SERPL-SCNC: 3.8 MMOL/L (ref 3.5–5.1)
SODIUM SERPL-SCNC: 140 MMOL/L (ref 136–145)

## 2018-09-14 PROCEDURE — 80048 BASIC METABOLIC PNL TOTAL CA: CPT | Performed by: FAMILY MEDICINE

## 2018-09-14 PROCEDURE — 74011250636 HC RX REV CODE- 250/636: Performed by: FAMILY MEDICINE

## 2018-09-14 PROCEDURE — 74011250637 HC RX REV CODE- 250/637: Performed by: INTERNAL MEDICINE

## 2018-09-14 PROCEDURE — 74011250637 HC RX REV CODE- 250/637: Performed by: SPECIALIST

## 2018-09-14 PROCEDURE — 74011250636 HC RX REV CODE- 250/636: Performed by: INTERNAL MEDICINE

## 2018-09-14 PROCEDURE — 65660000000 HC RM CCU STEPDOWN

## 2018-09-14 PROCEDURE — 74011636637 HC RX REV CODE- 636/637: Performed by: INTERNAL MEDICINE

## 2018-09-14 PROCEDURE — 74011000250 HC RX REV CODE- 250: Performed by: INTERNAL MEDICINE

## 2018-09-14 PROCEDURE — 36415 COLL VENOUS BLD VENIPUNCTURE: CPT | Performed by: FAMILY MEDICINE

## 2018-09-14 RX ORDER — LOSARTAN POTASSIUM 50 MG/1
50 TABLET ORAL ONCE
Status: COMPLETED | OUTPATIENT
Start: 2018-09-14 | End: 2018-09-14

## 2018-09-14 RX ORDER — LOSARTAN POTASSIUM 50 MG/1
100 TABLET ORAL DAILY
Status: DISCONTINUED | OUTPATIENT
Start: 2018-09-15 | End: 2018-09-19 | Stop reason: HOSPADM

## 2018-09-14 RX ORDER — DIPHENHYDRAMINE HYDROCHLORIDE 50 MG/ML
12.5 INJECTION, SOLUTION INTRAMUSCULAR; INTRAVENOUS
Status: DISCONTINUED | OUTPATIENT
Start: 2018-09-14 | End: 2018-09-18

## 2018-09-14 RX ADMIN — HYDROXYCHLOROQUINE SULFATE 200 MG: 200 TABLET, FILM COATED ORAL at 17:14

## 2018-09-14 RX ADMIN — ASPIRIN 81 MG CHEWABLE TABLET 81 MG: 81 TABLET CHEWABLE at 08:18

## 2018-09-14 RX ADMIN — HYDROCODONE BITARTRATE AND ACETAMINOPHEN 1 TABLET: 7.5; 325 TABLET ORAL at 17:14

## 2018-09-14 RX ADMIN — METOPROLOL TARTRATE 25 MG: 25 TABLET ORAL at 11:19

## 2018-09-14 RX ADMIN — Medication 10 ML: at 14:00

## 2018-09-14 RX ADMIN — GEMFIBROZIL 300 MG: 600 TABLET ORAL at 08:18

## 2018-09-14 RX ADMIN — FERRIC CITRATE 210 MG: 210 TABLET, COATED ORAL at 11:19

## 2018-09-14 RX ADMIN — APIXABAN 5 MG: 5 TABLET, FILM COATED ORAL at 17:14

## 2018-09-14 RX ADMIN — AZATHIOPRINE 50 MG: 50 TABLET ORAL at 09:13

## 2018-09-14 RX ADMIN — METOPROLOL TARTRATE 25 MG: 25 TABLET ORAL at 23:18

## 2018-09-14 RX ADMIN — FENTANYL CITRATE 25 MCG: 50 INJECTION, SOLUTION INTRAMUSCULAR; INTRAVENOUS at 12:34

## 2018-09-14 RX ADMIN — SODIUM CHLORIDE 2.5 MG/HR: 900 INJECTION, SOLUTION INTRAVENOUS at 07:15

## 2018-09-14 RX ADMIN — METOPROLOL TARTRATE 25 MG: 25 TABLET ORAL at 07:15

## 2018-09-14 RX ADMIN — PREDNISONE 10 MG: 5 TABLET ORAL at 08:18

## 2018-09-14 RX ADMIN — FERRIC CITRATE 210 MG: 210 TABLET, COATED ORAL at 17:14

## 2018-09-14 RX ADMIN — Medication 5 ML: at 06:00

## 2018-09-14 RX ADMIN — METOPROLOL TARTRATE 25 MG: 25 TABLET ORAL at 17:14

## 2018-09-14 RX ADMIN — APIXABAN 5 MG: 5 TABLET, FILM COATED ORAL at 08:19

## 2018-09-14 RX ADMIN — Medication 10 ML: at 21:44

## 2018-09-14 RX ADMIN — FENTANYL CITRATE 25 MCG: 50 INJECTION, SOLUTION INTRAMUSCULAR; INTRAVENOUS at 00:51

## 2018-09-14 RX ADMIN — LOSARTAN POTASSIUM 50 MG: 50 TABLET ORAL at 10:08

## 2018-09-14 RX ADMIN — HYDROXYCHLOROQUINE SULFATE 200 MG: 200 TABLET, FILM COATED ORAL at 08:19

## 2018-09-14 RX ADMIN — FENTANYL CITRATE 25 MCG: 50 INJECTION, SOLUTION INTRAMUSCULAR; INTRAVENOUS at 07:26

## 2018-09-14 RX ADMIN — AMITRIPTYLINE HYDROCHLORIDE 25 MG: 50 TABLET, FILM COATED ORAL at 21:43

## 2018-09-14 RX ADMIN — FENTANYL CITRATE 25 MCG: 50 INJECTION, SOLUTION INTRAMUSCULAR; INTRAVENOUS at 18:52

## 2018-09-14 RX ADMIN — FERRIC CITRATE 210 MG: 210 TABLET, COATED ORAL at 08:18

## 2018-09-14 RX ADMIN — PANTOPRAZOLE SODIUM 40 MG: 40 TABLET, DELAYED RELEASE ORAL at 07:15

## 2018-09-14 RX ADMIN — AMLODIPINE BESYLATE 10 MG: 5 TABLET ORAL at 08:18

## 2018-09-14 RX ADMIN — DIPHENHYDRAMINE HYDROCHLORIDE 12.5 MG: 50 INJECTION, SOLUTION INTRAMUSCULAR; INTRAVENOUS at 19:29

## 2018-09-14 RX ADMIN — HYDROCODONE BITARTRATE AND ACETAMINOPHEN 1 TABLET: 7.5; 325 TABLET ORAL at 21:43

## 2018-09-14 RX ADMIN — LOSARTAN POTASSIUM 50 MG: 50 TABLET ORAL at 08:19

## 2018-09-14 NOTE — PROGRESS NOTES
Problem: Hypertension  Goal: *Blood pressure within specified parameters  Outcome: Progressing Towards Goal  Pts blood pressure 123/91. Pt educated on oral antihypertensives including metoprolol and losartan and cardene drip. Pt verbalized understanding. Will continue to monitor and educate. 1230- Pts blood pressures remained stable this AM. Verbal orders received from Dr. Trice Kaur to stop cardene drip. 1930- pts blood pressure 146/104, Dr. Beto Juan notified. Orders received to recheck blood pressure in one hour and notify Dr. Beto Juan of results. Bedside shift change report given to Heidi Donnelly RN (oncoming nurse) by Valeria Mckeon RN (offgoing nurse). Report included the following information SBAR, Kardex, ED Summary, Intake/Output, MAR, Accordion, Recent Results, Med Rec Status, Cardiac Rhythm NSR and Alarm Parameters .

## 2018-09-14 NOTE — PROGRESS NOTES
0055 - Pt BP still elevated, last /132. Dr. Patricio Wolff paged, awaiting callback. [facility]  Dr. Patricio Wolff returned call. Will restart cardene gtt. Will continue to monitor. 0230 - Pt BP improved, last /87.    0256 - 3 lb weight loss noted. Pt weighed on standing scale on both 9/13 and 9/14. Pt had dialysis yesterday 9/13. Will address with day RN. 0800 - Bedside and Verbal shift change report given to Mary Ann (oncoming nurse) by Harrison Lin (offgoing nurse). Report included the following information SBAR, Kardex, ED Summary, Procedure Summary, Intake/Output, MAR, Accordion, Recent Results, Med Rec Status and Cardiac Rhythm NSR w/ST depression. Problem: Falls - Risk of  Goal: *Absence of Falls  Document Alex Fall Risk and appropriate interventions in the flowsheet. Outcome: Progressing Towards Goal  Fall Risk Interventions:        Medication Interventions: Evaluate medications/consider consulting pharmacy, Teach patient to arise slowly     Elimination Interventions: Call light in reach, Patient to call for help with toileting needs, Toileting schedule/hourly rounds              Problem: Hypertension  Goal: *Blood pressure within specified parameters  Outcome: Not Progressing Towards Goal  Pt BP increased during day shift after dialysis; continued to increase; cardene gtt had to be restarted.  Gtt restarted at 5 mg/hr per order.

## 2018-09-14 NOTE — PROGRESS NOTES
Hospitalist Progress Note  Daren Fleischer, MD  Answering service: [phone] OR [phone] from in house phone      Date of Service:  2018  NAME:  Matias Anders  :  1985  MRN:  [id number]      Admission Summary:   28 yo woman with dyslipidemia, h/o pulmonary embolism, ESRD on TTS HD, chronic pain syndrome, asthma, systemic lupus erythematosus presented to the ED from home on 9/10/18 with progressive fatigue, generalized body aches, abdominal pain, chest pain. She was admitted to [facility] with hypertensive urgency.      Interval history / Subjective:   Feels better, pain improving; back on nicardipine gtt last night; losartan improved and nicardipine gtt now at lowest dose; d/w nurse     Assessment & Plan:     Hypertensive urgency (POA) - back on nicardipine gtt and home meds resumed and being adjusted to wean off nicardipine gtt  - keep in IMCU  - CT head wo contrast 9/10 unremarkable  - pt reports very difficult to control BP despite medication adherence; will need adjustments to her home medication at the time of discharge    Elevated troponin with CP (POA) - nonspecific with ESRD and ECG without evidence of ACS  - Cards following  - TTE  ED 50-55%, no RWMA, increased LV wall thickness, mild-mod MR, mild TR  - continue BB    Dyslipidemia - LDL 60 but ; resumed home gemfibrozil    Chronic PE - continue apixaban    ESRD on HD - TTS HD as per Renal    Hyperkalemia - likely due to ESRD; kayexalate prn    Leukopenia (POA) - patient is asymptomatic  - consult Heme/Onc      Chronic pain syndrome and chronic back pain - pain control  - weaning off IV opiate    SLE with exacerbation (POA) - continue home Plaquenil, prednisone, azathioprine  - pain control    Asthma - controlled, nebs prn    Code status: full  DVT prophylaxis: SCDs    Care Plan discussed with: Patient/Family, Nurse and   Disposition: TBD     Hospital Problems  Date Reviewed: 9/10/2018          Codes Class Noted POA    * (Principal)Malignant hypertensive urgency ICD-10-CM: I16.0  ICD-9-CM: 401.0  9/10/2018 Yes                Review of Systems:   Pertinent items are noted in HPI. Vital Signs:    Last 24hrs VS reviewed since prior progress note. Most recent are:  Visit Vitals    /78    Pulse 85    Temp 97.8 °F (36.6 °C)    Resp 26    Ht 5' 5\" (1.651 m)    Wt 62.6 kg (138 lb 0.1 oz)    SpO2 99%    BMI 22.97 kg/m2         Intake/Output Summary (Last 24 hours) at 09/14/18 1132  Last data filed at 09/13/18 1720   Gross per 24 hour   Intake                0 ml   Output             3000 ml   Net            -3000 ml        Physical Examination:     Constitutional:  awake, no acute distress, cooperative, pleasant    ENT:  oral mucosa moist, oropharynx benign    Resp:  diminished BS   CV:  regular rhythm, normal rate, no m/r/g appreciated, RUE and b/l LE edema, RUE AVG +thrill/bruit    GI:  +BS, soft, non distended, NT    Musculoskeletal:  HENDRICKSON    Neurologic:  AAOx3, NFD                                             Skin:  warm, dry; multiple healed abdominal surgical scars      Data Review:    Review and/or order of clinical lab test  Review and/or order of tests in the radiology section of CPT  Review and/or order of tests in the medicine section of CPT      Labs:     Recent Labs      09/13/18   0717 09/12/18   0436   WBC  2.1*  1.6*   HGB  10.3*  11.0*   HCT  34.4*  35.7   PLT  168  179     Recent Labs      09/14/18   0426  09/13/18   0717  09/12/18   0436   NA  140  136  140   K  3.8  4.6  4.1   CL  100  98  103   CO2  28  24  27   BUN  50*  72*  44*   CREA  6.03*  8.16*  6.05*   GLU  89  93  91   CA  8.0*  7.8*  7.7*   MG   --    --   2.1   PHOS   --   6.6*  5.4*     Recent Labs      09/13/18   0717  09/12/18   0436   ALB  3.1*  3.2*     No results for input(s): INR, PTP, APTT in the last 72 hours.     No lab exists for component: INREXT, INREXT   No results for input(s): FE, TIBC, PSAT, FERR in the last 72 hours. Lab Results   Component Value Date/Time    Folate 13.7 06/17/2018 03:00 PM      No results for input(s): PH, PCO2, PO2 in the last 72 hours. No results for input(s): CPK, CKNDX, TROIQ in the last 72 hours.     No lab exists for component: CPKMB  Lab Results   Component Value Date/Time    Cholesterol, total 140 09/11/2018 06:17 AM    HDL Cholesterol 43 09/11/2018 06:17 AM    LDL, calculated 60.4 09/11/2018 06:17 AM    Triglyceride 183 (H) 09/11/2018 06:17 AM    CHOL/HDL Ratio 3.3 09/11/2018 06:17 AM     Lab Results   Component Value Date/Time    Glucose (POC) 104 (H) 08/16/2018 11:06 AM    Glucose (POC) 164 (H) 07/22/2018 11:43 AM    Glucose (POC) 138 (H) 07/21/2018 09:22 PM    Glucose (POC) 111 (H) 07/21/2018 04:57 PM    Glucose (POC) 105 (H) 07/21/2018 11:38 AM     Lab Results   Component Value Date/Time    Color YELLOW/STRAW 08/12/2016 09:06 PM    Appearance CLEAR 08/12/2016 09:06 PM    Specific gravity 1.017 08/12/2016 09:06 PM    Specific gravity 1.010 07/11/2016 12:44 PM    pH (UA) 7.0 08/12/2016 09:06 PM    Protein 300 (A) 08/12/2016 09:06 PM    Glucose NEGATIVE  08/12/2016 09:06 PM    Ketone TRACE (A) 08/12/2016 09:06 PM    Bilirubin NEGATIVE  07/11/2016 12:44 PM    Urobilinogen 1.0 08/12/2016 09:06 PM    Nitrites NEGATIVE  08/12/2016 09:06 PM    Leukocyte Esterase NEGATIVE  08/12/2016 09:06 PM    Epithelial cells MODERATE (A) 08/12/2016 09:06 PM    Bacteria 1+ (A) 08/12/2016 09:06 PM    WBC 0-4 08/12/2016 09:06 PM    RBC 0-5 08/12/2016 09:06 PM         Medications Reviewed:     Current Facility-Administered Medications   Medication Dose Route Frequency    [START ON 9/15/2018] losartan (COZAAR) tablet 100 mg  100 mg Oral DAILY    HYDROcodone-acetaminophen (NORCO) 7.5-325 mg per tablet 1 Tab  1 Tab Oral Q3H PRN    fentaNYL citrate (PF) injection 25 mcg  25 mcg IntraVENous Q6H PRN    acetaminophen (TYLENOL) tablet 650 mg  650 mg Oral Q6H PRN    cloNIDine (CATAPRES) 0.3 mg/24 hr patch 1 Patch  1 Patch TransDERmal Q7D    amLODIPine (NORVASC) tablet 10 mg  10 mg Oral DAILY    ferric citrate (AURYXIA) tablet 210 mg  210 mg Oral TID WITH MEALS    amitriptyline (ELAVIL) tablet 25 mg  25 mg Oral QHS    apixaban (ELIQUIS) tablet 5 mg  5 mg Oral BID    azaTHIOprine (IMURAN) tablet 50 mg  50 mg Oral DAILY AFTER BREAKFAST    gemfibrozil (LOPID) tablet 300 mg  300 mg Oral DAILY    hydroxychloroquine (PLAQUENIL) tablet 200 mg  200 mg Oral BID    pantoprazole (PROTONIX) tablet 40 mg  40 mg Oral ACB    polyethylene glycol (MIRALAX) packet 17 g  17 g Oral DAILY PRN    predniSONE (DELTASONE) tablet 10 mg  10 mg Oral DAILY    senna (SENOKOT) tablet 8.6 mg  1 Tab Oral DAILY PRN    sodium chloride (NS) flush 5-10 mL  5-10 mL IntraVENous Q8H    sodium chloride (NS) flush 5-10 mL  5-10 mL IntraVENous PRN    ondansetron (ZOFRAN) injection 4 mg  4 mg IntraVENous Q4H PRN    albuterol-ipratropium (DUO-NEB) 2.5 MG-0.5 MG/3 ML  3 mL Nebulization Q4H PRN    aspirin chewable tablet 81 mg  81 mg Oral DAILY    niCARdipine (CARDENE) 25 mg in 0.9% sodium chloride 250 mL infusion  0-15 mg/hr IntraVENous TITRATE    metoprolol tartrate (LOPRESSOR) tablet 25 mg  25 mg Oral Q6H     ______________________________________________________________________  EXPECTED LENGTH OF STAY: 2d 2h  ACTUAL LENGTH OF STAY:          4                 Dwayne Duggan MD

## 2018-09-14 NOTE — PROGRESS NOTES
NAME: Alton Guadarrama        :  1985        MRN:  [id number]        Assessment :    Plan:  --ZPNT-GYW-FGF-[place]  Anemia  CP --No acute need for HD today. Plan HD in AM.    Additional 50 losartan this AM for a total of 100 mg and then 100 mg daily starting tomorrow. Subjective:     Chief Complaint:  \" I feel fine. \"  No CP. No N/V. No dyspnea. No HA    Review of Systems:    Symptom Y/N Comments  Symptom Y/N Comments   Fever/Chills    Chest Pain     Poor Appetite    Edema     Cough    Abdominal Pain     Sputum    Joint Pain     SOB/IVY    Pruritis/Rash     Nausea/vomit    Tolerating PT/OT     Diarrhea    Tolerating Diet     Constipation    Other       Could not obtain due to:      Objective:     VITALS:   Last 24hrs VS reviewed since prior progress note.  Most recent are:  Visit Vitals    BP (!) 123/91    Pulse 76    Temp 97.8 °F (36.6 °C)    Resp 26    Ht 5' 5\" (1.651 m)    Wt 62.6 kg (138 lb 0.1 oz)    SpO2 99%    BMI 22.97 kg/m2       Intake/Output Summary (Last 24 hours) at 18 9847  Last data filed at 18 1720   Gross per 24 hour   Intake              120 ml   Output             3000 ml   Net            -2880 ml      Telemetry Reviewed:     PHYSICAL EXAM:  General: NAD  No edema      Lab Data Reviewed: (see below)    Medications Reviewed: (see below)    PMH/SH reviewed - no change compared to H&P  ________________________________________________________________________  Care Plan discussed with:  Patient     Family      RN     Care Manager                    Consultant:          Comments   >50% of visit spent in counseling and coordination of care       ________________________________________________________________________  Ova MD [place]     Procedures: see electronic medical records for all procedures/Xrays and details which  were not copied into this note but were reviewed prior to creation of Plan. LABS:  Recent Labs      09/13/18 0717 09/12/18 0436   WBC  2.1*  1.6*   HGB  10.3*  11.0*   HCT  34.4*  35.7   PLT  168  179     Recent Labs      09/14/18   0426  09/13/18 0717 09/12/18 0436   NA  140  136  140   K  3.8  4.6  4.1   CL  100  98  103   CO2  28  24  27   BUN  50*  72*  44*   CREA  6.03*  8.16*  6.05*   GLU  89  93  91   CA  8.0*  7.8*  7.7*   MG   --    --   2.1   PHOS   --   6.6*  5.4*     Recent Labs      09/13/18 0717 09/12/18 0436   ALB  3.1*  3.2*     No results for input(s): INR, PTP, APTT in the last 72 hours. No lab exists for component: INREXT, INREXT   No results for input(s): FE, TIBC, PSAT, FERR in the last 72 hours. Lab Results   Component Value Date/Time    Folate 13.7 06/17/2018 03:00 PM      No results for input(s): PH, PCO2, PO2 in the last 72 hours. No results for input(s): CPK, CKMB in the last 72 hours.     No lab exists for component: TROPONINI  No components found for: [place]  Lab Results   Component Value Date/Time    Color YELLOW/STRAW 08/12/2016 09:06 PM    Appearance CLEAR 08/12/2016 09:06 PM    Specific gravity 1.017 08/12/2016 09:06 PM    Specific gravity 1.010 07/11/2016 12:44 PM    pH (UA) 7.0 08/12/2016 09:06 PM    Protein 300 (A) 08/12/2016 09:06 PM    Glucose NEGATIVE  08/12/2016 09:06 PM    Ketone TRACE (A) 08/12/2016 09:06 PM    Bilirubin NEGATIVE  07/11/2016 12:44 PM    Urobilinogen 1.0 08/12/2016 09:06 PM    Nitrites NEGATIVE  08/12/2016 09:06 PM    Leukocyte Esterase NEGATIVE  08/12/2016 09:06 PM    Epithelial cells MODERATE (A) 08/12/2016 09:06 PM    Bacteria 1+ (A) 08/12/2016 09:06 PM    WBC 0-4 08/12/2016 09:06 PM    RBC 0-5 08/12/2016 09:06 PM       MEDICATIONS:  Current Facility-Administered Medications   Medication Dose Route Frequency    [START ON 9/15/2018] losartan (COZAAR) tablet 100 mg  100 mg Oral DAILY    losartan (COZAAR) tablet 50 mg  50 mg Oral ONCE    HYDROcodone-acetaminophen (NORCO) 7.5-325 mg per tablet 1 Tab  1 Tab Oral Q3H PRN    fentaNYL citrate (PF) injection 25 mcg  25 mcg IntraVENous Q6H PRN    acetaminophen (TYLENOL) tablet 650 mg  650 mg Oral Q6H PRN    cloNIDine (CATAPRES) 0.3 mg/24 hr patch 1 Patch  1 Patch TransDERmal Q7D    amLODIPine (NORVASC) tablet 10 mg  10 mg Oral DAILY    ferric citrate (AURYXIA) tablet 210 mg  210 mg Oral TID WITH MEALS    amitriptyline (ELAVIL) tablet 25 mg  25 mg Oral QHS    apixaban (ELIQUIS) tablet 5 mg  5 mg Oral BID    azaTHIOprine (IMURAN) tablet 50 mg  50 mg Oral DAILY AFTER BREAKFAST    gemfibrozil (LOPID) tablet 300 mg  300 mg Oral DAILY    hydroxychloroquine (PLAQUENIL) tablet 200 mg  200 mg Oral BID    pantoprazole (PROTONIX) tablet 40 mg  40 mg Oral ACB    polyethylene glycol (MIRALAX) packet 17 g  17 g Oral DAILY PRN    predniSONE (DELTASONE) tablet 10 mg  10 mg Oral DAILY    senna (SENOKOT) tablet 8.6 mg  1 Tab Oral DAILY PRN    sodium chloride (NS) flush 5-10 mL  5-10 mL IntraVENous Q8H    sodium chloride (NS) flush 5-10 mL  5-10 mL IntraVENous PRN    ondansetron (ZOFRAN) injection 4 mg  4 mg IntraVENous Q4H PRN    albuterol-ipratropium (DUO-NEB) 2.5 MG-0.5 MG/3 ML  3 mL Nebulization Q4H PRN    aspirin chewable tablet 81 mg  81 mg Oral DAILY    niCARdipine (CARDENE) 25 mg in 0.9% sodium chloride 250 mL infusion  0-15 mg/hr IntraVENous TITRATE    metoprolol tartrate (LOPRESSOR) tablet 25 mg  25 mg Oral Q6H

## 2018-09-15 PROBLEM — D70.9 NEUTROPENIA (HCC): Status: ACTIVE | Noted: 2018-09-15

## 2018-09-15 PROBLEM — N18.9 ANEMIA IN CHRONIC KIDNEY DISEASE: Status: ACTIVE | Noted: 2018-09-15

## 2018-09-15 PROBLEM — D63.1 ANEMIA IN CHRONIC KIDNEY DISEASE: Status: ACTIVE | Noted: 2018-09-15

## 2018-09-15 LAB
ANION GAP SERPL CALC-SCNC: 12 MMOL/L (ref 5–15)
BUN SERPL-MCNC: 81 MG/DL (ref 6–20)
BUN/CREAT SERPL: 10 (ref 12–20)
CALCIUM SERPL-MCNC: 7.6 MG/DL (ref 8.5–10.1)
CHLORIDE SERPL-SCNC: 100 MMOL/L (ref 97–108)
CO2 SERPL-SCNC: 25 MMOL/L (ref 21–32)
CREAT SERPL-MCNC: 8.02 MG/DL (ref 0.55–1.02)
ERYTHROCYTE [DISTWIDTH] IN BLOOD BY AUTOMATED COUNT: 14.9 % (ref 11.5–14.5)
GLUCOSE SERPL-MCNC: 86 MG/DL (ref 65–100)
HCT VFR BLD AUTO: 31.9 % (ref 35–47)
HGB BLD-MCNC: 9.7 G/DL (ref 11.5–16)
MCH RBC QN AUTO: 28.4 PG (ref 26–34)
MCHC RBC AUTO-ENTMCNC: 30.4 G/DL (ref 30–36.5)
MCV RBC AUTO: 93.3 FL (ref 80–99)
NRBC # BLD: 0 K/UL (ref 0–0.01)
NRBC BLD-RTO: 0 PER 100 WBC
PLATELET # BLD AUTO: 154 K/UL (ref 150–400)
PMV BLD AUTO: 11.2 FL (ref 8.9–12.9)
POTASSIUM SERPL-SCNC: 4.2 MMOL/L (ref 3.5–5.1)
RBC # BLD AUTO: 3.42 M/UL (ref 3.8–5.2)
SODIUM SERPL-SCNC: 137 MMOL/L (ref 136–145)
WBC # BLD AUTO: 2.5 K/UL (ref 3.6–11)

## 2018-09-15 PROCEDURE — 80048 BASIC METABOLIC PNL TOTAL CA: CPT | Performed by: INTERNAL MEDICINE

## 2018-09-15 PROCEDURE — 74011250637 HC RX REV CODE- 250/637: Performed by: SPECIALIST

## 2018-09-15 PROCEDURE — 74011250636 HC RX REV CODE- 250/636: Performed by: INTERNAL MEDICINE

## 2018-09-15 PROCEDURE — 74011636637 HC RX REV CODE- 636/637: Performed by: INTERNAL MEDICINE

## 2018-09-15 PROCEDURE — 74011250637 HC RX REV CODE- 250/637: Performed by: INTERNAL MEDICINE

## 2018-09-15 PROCEDURE — 74011250636 HC RX REV CODE- 250/636: Performed by: FAMILY MEDICINE

## 2018-09-15 PROCEDURE — 85027 COMPLETE CBC AUTOMATED: CPT | Performed by: INTERNAL MEDICINE

## 2018-09-15 PROCEDURE — 90935 HEMODIALYSIS ONE EVALUATION: CPT

## 2018-09-15 PROCEDURE — 36415 COLL VENOUS BLD VENIPUNCTURE: CPT | Performed by: INTERNAL MEDICINE

## 2018-09-15 PROCEDURE — 65660000000 HC RM CCU STEPDOWN

## 2018-09-15 PROCEDURE — 74011000250 HC RX REV CODE- 250: Performed by: INTERNAL MEDICINE

## 2018-09-15 RX ORDER — FENTANYL CITRATE 50 UG/ML
12.5 INJECTION, SOLUTION INTRAMUSCULAR; INTRAVENOUS
Status: DISCONTINUED | OUTPATIENT
Start: 2018-09-15 | End: 2018-09-18

## 2018-09-15 RX ORDER — CARVEDILOL 12.5 MG/1
25 TABLET ORAL 2 TIMES DAILY WITH MEALS
Status: DISCONTINUED | OUTPATIENT
Start: 2018-09-15 | End: 2018-09-19 | Stop reason: HOSPADM

## 2018-09-15 RX ADMIN — FENTANYL CITRATE 25 MCG: 50 INJECTION, SOLUTION INTRAMUSCULAR; INTRAVENOUS at 02:18

## 2018-09-15 RX ADMIN — HYDROCODONE BITARTRATE AND ACETAMINOPHEN 1 TABLET: 7.5; 325 TABLET ORAL at 19:29

## 2018-09-15 RX ADMIN — AMLODIPINE BESYLATE 10 MG: 5 TABLET ORAL at 08:51

## 2018-09-15 RX ADMIN — FENTANYL CITRATE 12.5 MCG: 50 INJECTION, SOLUTION INTRAMUSCULAR; INTRAVENOUS at 23:00

## 2018-09-15 RX ADMIN — HYDROXYCHLOROQUINE SULFATE 200 MG: 200 TABLET, FILM COATED ORAL at 17:17

## 2018-09-15 RX ADMIN — APIXABAN 5 MG: 5 TABLET, FILM COATED ORAL at 08:51

## 2018-09-15 RX ADMIN — DIPHENHYDRAMINE HYDROCHLORIDE 12.5 MG: 50 INJECTION, SOLUTION INTRAMUSCULAR; INTRAVENOUS at 02:19

## 2018-09-15 RX ADMIN — DIPHENHYDRAMINE HYDROCHLORIDE 12.5 MG: 50 INJECTION, SOLUTION INTRAMUSCULAR; INTRAVENOUS at 21:26

## 2018-09-15 RX ADMIN — HYDROXYCHLOROQUINE SULFATE 200 MG: 200 TABLET, FILM COATED ORAL at 08:51

## 2018-09-15 RX ADMIN — AZATHIOPRINE 50 MG: 50 TABLET ORAL at 08:57

## 2018-09-15 RX ADMIN — FERRIC CITRATE 210 MG: 210 TABLET, COATED ORAL at 15:16

## 2018-09-15 RX ADMIN — CARVEDILOL 25 MG: 12.5 TABLET, FILM COATED ORAL at 17:17

## 2018-09-15 RX ADMIN — PANTOPRAZOLE SODIUM 40 MG: 40 TABLET, DELAYED RELEASE ORAL at 07:33

## 2018-09-15 RX ADMIN — Medication 10 ML: at 15:16

## 2018-09-15 RX ADMIN — ASPIRIN 81 MG CHEWABLE TABLET 81 MG: 81 TABLET CHEWABLE at 08:51

## 2018-09-15 RX ADMIN — Medication 10 ML: at 21:27

## 2018-09-15 RX ADMIN — METOPROLOL TARTRATE 25 MG: 25 TABLET ORAL at 04:12

## 2018-09-15 RX ADMIN — APIXABAN 5 MG: 5 TABLET, FILM COATED ORAL at 17:17

## 2018-09-15 RX ADMIN — FERRIC CITRATE 210 MG: 210 TABLET, COATED ORAL at 08:57

## 2018-09-15 RX ADMIN — AMITRIPTYLINE HYDROCHLORIDE 25 MG: 50 TABLET, FILM COATED ORAL at 21:26

## 2018-09-15 RX ADMIN — GEMFIBROZIL 300 MG: 600 TABLET ORAL at 08:51

## 2018-09-15 RX ADMIN — SODIUM CHLORIDE 2.5 MG/HR: 900 INJECTION, SOLUTION INTRAVENOUS at 15:14

## 2018-09-15 RX ADMIN — FENTANYL CITRATE 25 MCG: 50 INJECTION, SOLUTION INTRAMUSCULAR; INTRAVENOUS at 08:52

## 2018-09-15 RX ADMIN — FENTANYL CITRATE 25 MCG: 50 INJECTION, SOLUTION INTRAMUSCULAR; INTRAVENOUS at 15:25

## 2018-09-15 RX ADMIN — Medication 10 ML: at 07:34

## 2018-09-15 RX ADMIN — LOSARTAN POTASSIUM 100 MG: 50 TABLET ORAL at 08:51

## 2018-09-15 RX ADMIN — DIPHENHYDRAMINE HYDROCHLORIDE 12.5 MG: 50 INJECTION, SOLUTION INTRAMUSCULAR; INTRAVENOUS at 15:26

## 2018-09-15 RX ADMIN — DIPHENHYDRAMINE HYDROCHLORIDE 12.5 MG: 50 INJECTION, SOLUTION INTRAMUSCULAR; INTRAVENOUS at 08:52

## 2018-09-15 RX ADMIN — PREDNISONE 10 MG: 5 TABLET ORAL at 08:50

## 2018-09-15 NOTE — PROGRESS NOTES
0005: spoke with dr Poncho Guevara regarding patients blood pressure of 144/103 after receiving lopressor. No orders received at this time will continue to monitor. 0400: spoke with dr Poncho Guevara regarding  regarding BP received orders to give 6am dose of lopressor now. Bedside shift change report given to Padma BOSCH (oncoming nurse) by Annalise Solorzano RN (offgoing nurse). Report included the following information SBAR, Kardex, MAR, Recent Results and Cardiac Rhythm NSR.

## 2018-09-15 NOTE — CONSULTS
[facility] [place] [address]  [address] [facility], [address] [place] [address]  Cherise Cárdenas: [phone]  F: [phone]      Reason for Visit:   Tiera Riggs is a 29 y.o. female who is seen in consultation at the request of Dr. Mireya Rodriguez for evaluation of leukopenia. History of Present Illness:   Tiera Riggs is a pleasant 29 y.o. female who was admitted on 9/11/2018 with hypertensive urgency, chest pain, and elevated troponins. She has a history of PE in the past for which she is taking apixaban. She also has ESRD for which she is on HD. She has a history of severe systemic lupus as well, for which she takes prednisone, plaquenil, and azathioprine. She presented to the ED complaining of chest pain for 1-2 days, along with some dyspnea. Her BP was noted to be very elevated and she was admitted for management. Her WBC was noted to be persistently low, for which I was consulted. She reports feeling better. Chest pain has resolved. She continues with BP issues. She was getting HD during our visit. She denies recurring infections. No fevers, chills, night sweats, unintentional weight loss, adenopathy. Past Medical History:   Diagnosis Date    Anemia     secondary to lupus    Asthma     no inhaler use in past 2 to 3 years    Carditis     Chronic kidney disease     ESRD    Chronic pain     DDD (degenerative disc disease), lumbar     ESRD (end stage renal disease) (HCC)     GERD (gastroesophageal reflux disease)     Hemodialysis patient ([facility] 12/21/2017    [address]  Tuesday,  Thursday,  and Saturday.      Hypercholesterolemia     Hypertension     Intractable nausea and vomiting 10/21/2015    Long term (current) use of anticoagulants     Lupus (systemic lupus erythematosus) (HCC)     Malignant hypertension with chronic kidney disease stage V ([facility]     Peritoneal dialysis status ([facility] 10/2015    x 2 years Stopped 12/2017 due to infection and removed.  Poor historian 2018    With medications    Thromboembolus ([address] 2013    lungs    Transfusion history     Last Transfusion 2017  at [address] [place] [address]      Past Surgical History:   Procedure Laterality Date    HX  SECTION  11/2006    x1    HX OTHER SURGICAL  9/16/15    INSERTION PD CATH;  Removed 2017    HX VASCULAR ACCESS Right 2017    Double-Lumen henry catheter upper chest      Social History   Substance Use Topics    Smoking status: Never Smoker    Smokeless tobacco: Never Used    Alcohol use No      Family History   Problem Relation Age of Onset    Diabetes Father     Hypertension Father     Cancer Other      aunt with breast cancer    Diabetes Mother      Current Facility-Administered Medications   Medication Dose Route Frequency    carvedilol (COREG) tablet 25 mg  25 mg Oral BID WITH MEALS    losartan (COZAAR) tablet 100 mg  100 mg Oral DAILY    diphenhydrAMINE (BENADRYL) injection 12.5 mg  12.5 mg IntraVENous Q6H PRN    HYDROcodone-acetaminophen (NORCO) 7.5-325 mg per tablet 1 Tab  1 Tab Oral Q3H PRN    fentaNYL citrate (PF) injection 25 mcg  25 mcg IntraVENous Q6H PRN    acetaminophen (TYLENOL) tablet 650 mg  650 mg Oral Q6H PRN    cloNIDine (CATAPRES) 0.3 mg/24 hr patch 1 Patch  1 Patch TransDERmal Q7D    amLODIPine (NORVASC) tablet 10 mg  10 mg Oral DAILY    ferric citrate (AURYXIA) tablet 210 mg  210 mg Oral TID WITH MEALS    amitriptyline (ELAVIL) tablet 25 mg  25 mg Oral QHS    apixaban (ELIQUIS) tablet 5 mg  5 mg Oral BID    azaTHIOprine (IMURAN) tablet 50 mg  50 mg Oral DAILY AFTER BREAKFAST    gemfibrozil (LOPID) tablet 300 mg  300 mg Oral DAILY    hydroxychloroquine (PLAQUENIL) tablet 200 mg  200 mg Oral BID    pantoprazole (PROTONIX) tablet 40 mg  40 mg Oral ACB    polyethylene glycol (MIRALAX) packet 17 g  17 g Oral DAILY PRN    predniSONE (DELTASONE) tablet 10 mg  10 mg Oral DAILY    senna (SENOKOT) tablet 8.6 mg  1 Tab Oral DAILY PRN    sodium chloride (NS) flush 5-10 mL  5-10 mL IntraVENous Q8H    sodium chloride (NS) flush 5-10 mL  5-10 mL IntraVENous PRN    ondansetron (ZOFRAN) injection 4 mg  4 mg IntraVENous Q4H PRN    albuterol-ipratropium (DUO-NEB) 2.5 MG-0.5 MG/3 ML  3 mL Nebulization Q4H PRN    aspirin chewable tablet 81 mg  81 mg Oral DAILY    niCARdipine (CARDENE) 25 mg in 0.9% sodium chloride 250 mL infusion  0-15 mg/hr IntraVENous TITRATE      Allergies   Allergen Reactions    Compazine [Prochlorperazine Edisylate] Nausea and Vomiting and Palpitations        Review of Systems: A complete review of systems was obtained, negative except as described above. Physical Exam:     Visit Vitals    BP (!) 173/106    Pulse 86    Temp 97.3 °F (36.3 °C)    Resp 22    Ht 5' 5\" (1.651 m)    Wt 144 lb 2.9 oz (65.4 kg)    SpO2 100%    BMI 23.99 kg/m2     General: No distress  Eyes: PERRLA, anicteric sclerae  HENT: Atraumatic, OP clear  Neck: Supple  Lymphatic: No cervical, supraclavicular, or inguinal adenopathy  Respiratory: CTAB, normal respiratory effort  CV: Normal rate, regular rhythm, no murmurs, no peripheral edema  GI: Soft, nontender, nondistended, no masses, no hepatomegaly, no splenomegaly  Skin: No rashes, ecchymoses, or petechiae. Normal temperature, turgor, and texture. Psych: Alert, oriented, appropriate affect, normal judgment/insight    Results:     Lab Results   Component Value Date/Time    WBC 2.5 (L) 09/15/2018 05:08 AM    HGB 9.7 (L) 09/15/2018 05:08 AM    HCT 31.9 (L) 09/15/2018 05:08 AM    PLATELET 102 69/02/5626 05:08 AM    MCV 93.3 09/15/2018 05:08 AM    ABS.  NEUTROPHILS 1.2 (L) 09/13/2018 07:17 AM    Hemoglobin (POC) 7.8 (L) 01/19/2018 12:30 PM    Hematocrit (POC) 23 (L) 01/19/2018 12:30 PM     Lab Results   Component Value Date/Time    Sodium 137 09/15/2018 05:08 AM    Potassium 4.2 09/15/2018 05:08 AM    Chloride 100 09/15/2018 05:08 AM    CO2 25 09/15/2018 05:08 AM    Glucose 86 09/15/2018 05:08 AM    BUN 81 (H) 09/15/2018 05:08 AM    Creatinine 8.02 (H) 09/15/2018 05:08 AM    GFR est AA 7 (L) 09/15/2018 05:08 AM    GFR est non-AA 6 (L) 09/15/2018 05:08 AM    Calcium 7.6 (L) 09/15/2018 05:08 AM    Sodium (POC) 137 01/19/2018 12:30 PM    Potassium (POC) 4.4 01/19/2018 12:30 PM    Chloride (POC) 96 (L) 01/19/2018 12:30 PM    Glucose (POC) 104 (H) 08/16/2018 11:06 AM    BUN (POC) 24 (H) 01/19/2018 12:30 PM    Creatinine (POC) 5.5 (H) 01/19/2018 12:30 PM    Calcium, ionized (POC) 1.14 01/19/2018 12:30 PM     Lab Results   Component Value Date/Time    Bilirubin, total 0.5 09/11/2018 06:17 AM    ALT (SGPT) 23 09/11/2018 06:17 AM    AST (SGOT) 27 09/11/2018 06:17 AM    Alk. phosphatase 69 09/11/2018 06:17 AM    Protein, total 7.9 09/11/2018 06:17 AM    Albumin 3.1 (L) 09/13/2018 07:17 AM    Globulin 4.7 (H) 09/11/2018 06:17 AM     Lab Results   Component Value Date/Time    Reticulocyte count 0.7 03/15/2018 04:56 AM    Iron % saturation 25 06/17/2018 03:00 PM    TIBC 137 (L) 06/17/2018 03:00 PM    Ferritin 4182 (H) 06/17/2018 03:00 PM    Vitamin B12 >2000 (H) 06/17/2018 03:00 PM    Folate 13.7 06/17/2018 03:00 PM    Haptoglobin 225 (H) 03/15/2018 10:32 AM    Haptoglobin 165 05/30/2009 08:50 PM     (H) 05/13/2018 06:47 AM    Sed rate (ESR) 15 08/18/2016 11:46 AM    Sed rate, automated 54 (H) 03/18/2018 03:06 AM    C-Reactive protein 0.60 06/11/2012 09:30 PM    C-Reactive Protein, Qt 3.3 08/18/2016 11:46 AM    TSH 1.70 09/11/2018 06:17 AM    M-Darian Not Observed 03/07/2014 02:16 PM    JIN, Direct Detected (A) 10/25/2010 10:15 AM    Antinuclear Antibodies Direct Positive (A) 04/24/2013 09:10 AM    Lipase 376 09/11/2018 06:17 AM    Hep C  virus Ab Interp.  NONREACTIVE 12/14/2017 09:40 AM    HEP C VIRUS AB <0.1 10/27/2015 03:17 PM    HIV 1/O/2 Abs <1.00 10/27/2015 03:17 PM     Lab Results   Component Value Date/Time    INR 1.1 05/13/2018 09:51 AM    aPTT 30.7 05/13/2018 09:51 AM    D-dimer 15.97 (H) 01/01/2016 05:01 PM D-Dimer 2.93 (H) 04/16/2014 11:25 AM    Fibrinogen 261 05/31/2009 10:30 AM       3/15/2018:  Normocytic, Normchromic anemia with increased anisopoikilocytosis. Thrombocytopenia without  platelets aggregation. Neutrophils   demonstrate reactive features. Records reviewed and summarized above. Assessment/Recommendations:   1) Leukopenia (neutropenia and lymphopenia)  Mild, with ANC 1.2. This appears to be a chronic/intermittent issue dating back to at least 2009. It is most likely secondary to her lupus and related medications (both azathioprine and plaquenil are associated with leukopenia). A primary hematologic disorder is unlikely. I will order a peripheral smear, but I don't think further testing is needed at this time. Monitor, consider bone marrow biopsy if ANC falls persistently <1k. 2) Anemia of chronic disease and kidney disease  Monitor and continue treatment of under underlying medical problems. EPO PRN with dialysis. 3) H/o PE  Continue anticoagulation long-term. I appreciate the opportunity to participate in [facility] [id number] [facility].     Signed By: Janine Alcantara MD

## 2018-09-15 NOTE — PROGRESS NOTES
Hospitalist Progress Note  Alejandro Interiano MD  Answering service: [phone] OR [phone] from in house phone      Date of Service:  9/15/2018  NAME:  Chauncey Baldwin  :  1985  MRN:  [id number]      Admission Summary:   28 yo woman with dyslipidemia, h/o pulmonary embolism, ESRD on TTS HD, chronic pain syndrome, asthma, systemic lupus erythematosus presented to the ED from home on 9/10/18 with progressive fatigue, generalized body aches, abdominal pain, chest pain. She was admitted to [facility] with hypertensive urgency.      Interval history / Subjective:   Feels better, pain improving; off nicardipine gtt since yesterday but BP back up despite med adjustment by Renal; d/w nurse, just finished HD     Assessment & Plan:     Hypertensive urgency (POA) - has been on and off nicardipine gtt and home meds being adjusted to try and wean off nicardipine gtt  - CT head wo contrast 9/10 unremarkable  - pt reports very difficult to control BP despite medication adherence; will need adjustments to her home medication at the time of discharge  - keep in IMCU due to prn nicardipine gtt  - nursing staff, pt, and HD nurse advised not to hold BP meds prior to HD    Elevated troponin with CP (POA) - nonspecific with ESRD and ECG without evidence of ACS  - Cards following  - TTE  ED 50-55%, no RWMA, increased LV wall thickness, mild-mod MR, mild TR  - continue BB, ARB    Dyslipidemia - LDL 60 but ; resumed home gemfibrozil    Chronic PE - continue apixaban    ESRD on HD - TTS HD as per Renal    Hyperkalemia - likely due to ESRD; kayexalate prn    Leukopenia (POA) - patient is asymptomatic  - consult Heme/Onc      Chronic pain syndrome and chronic back pain - pain control  - weaning off IV opiate    SLE with exacerbation (POA) - continue home Plaquenil, prednisone, azathioprine  - pain control    Asthma - controlled, nebs prn    Code status: full  DVT prophylaxis: SCDs    Care Plan discussed with: Patient/Family and Nurse  Disposition: TBD     Hospital Problems  Date Reviewed: 9/10/2018          Codes Class Noted POA    * (Principal)Malignant hypertensive urgency ICD-10-CM: I16.0  ICD-9-CM: 401.0  9/10/2018 Yes                Review of Systems:   Pertinent items are noted in HPI. Vital Signs:    Last 24hrs VS reviewed since prior progress note.  Most recent are:  Visit Vitals    BP (!) 173/106    Pulse 86    Temp 97.3 °F (36.3 °C)    Resp 22    Ht 5' 5\" (1.651 m)    Wt 65.4 kg (144 lb 2.9 oz)    SpO2 100%    BMI 23.99 kg/m2         Intake/Output Summary (Last 24 hours) at 09/15/18 1436  Last data filed at 09/14/18 1500   Gross per 24 hour   Intake              120 ml   Output                0 ml   Net              120 ml        Physical Examination:     Constitutional:  awake, no acute distress, cooperative, pleasant    ENT:  oral mucosa moist, oropharynx benign    Resp:  diminished BS   CV:  regular rhythm, normal rate, no m/r/g appreciated, RUE and b/l LE edema, RUE AVG +thrill/bruit    GI:  +BS, soft, non distended, NT    Musculoskeletal:  HENDRICKSON    Neurologic:  AAOx3, NFD                                             Skin:  warm, dry; multiple healed abdominal surgical scars      Data Review:    Review and/or order of clinical lab test  Review and/or order of tests in the radiology section of CPT  Review and/or order of tests in the medicine section of CPT      Labs:     Recent Labs      09/15/18   0508  09/13/18   0717   WBC  2.5*  2.1*   HGB  9.7*  10.3*   HCT  31.9*  34.4*   PLT  154  168     Recent Labs      09/15/18   0508  09/14/18   0426  09/13/18   0717   NA  137  140  136   K  4.2  3.8  4.6   CL  100  100  98   CO2  25  28  24   BUN  81*  50*  72*   CREA  8.02*  6.03*  8.16*   GLU  86  89  93   CA  7.6*  8.0*  7.8*   PHOS   --    --   6.6*     Recent Labs      09/13/18   0717   ALB  3.1*     No results for input(s): INR, PTP, APTT in the last 72 hours. No lab exists for component: INREXT, INREXT   No results for input(s): FE, TIBC, PSAT, FERR in the last 72 hours. Lab Results   Component Value Date/Time    Folate 13.7 06/17/2018 03:00 PM      No results for input(s): PH, PCO2, PO2 in the last 72 hours. No results for input(s): CPK, CKNDX, TROIQ in the last 72 hours.     No lab exists for component: CPKMB  Lab Results   Component Value Date/Time    Cholesterol, total 140 09/11/2018 06:17 AM    HDL Cholesterol 43 09/11/2018 06:17 AM    LDL, calculated 60.4 09/11/2018 06:17 AM    Triglyceride 183 (H) 09/11/2018 06:17 AM    CHOL/HDL Ratio 3.3 09/11/2018 06:17 AM     Lab Results   Component Value Date/Time    Glucose (POC) 104 (H) 08/16/2018 11:06 AM    Glucose (POC) 164 (H) 07/22/2018 11:43 AM    Glucose (POC) 138 (H) 07/21/2018 09:22 PM    Glucose (POC) 111 (H) 07/21/2018 04:57 PM    Glucose (POC) 105 (H) 07/21/2018 11:38 AM     Lab Results   Component Value Date/Time    Color YELLOW/STRAW 08/12/2016 09:06 PM    Appearance CLEAR 08/12/2016 09:06 PM    Specific gravity 1.017 08/12/2016 09:06 PM    Specific gravity 1.010 07/11/2016 12:44 PM    pH (UA) 7.0 08/12/2016 09:06 PM    Protein 300 (A) 08/12/2016 09:06 PM    Glucose NEGATIVE  08/12/2016 09:06 PM    Ketone TRACE (A) 08/12/2016 09:06 PM    Bilirubin NEGATIVE  07/11/2016 12:44 PM    Urobilinogen 1.0 08/12/2016 09:06 PM    Nitrites NEGATIVE  08/12/2016 09:06 PM    Leukocyte Esterase NEGATIVE  08/12/2016 09:06 PM    Epithelial cells MODERATE (A) 08/12/2016 09:06 PM    Bacteria 1+ (A) 08/12/2016 09:06 PM    WBC 0-4 08/12/2016 09:06 PM    RBC 0-5 08/12/2016 09:06 PM         Medications Reviewed:     Current Facility-Administered Medications   Medication Dose Route Frequency    carvedilol (COREG) tablet 25 mg  25 mg Oral BID WITH MEALS    losartan (COZAAR) tablet 100 mg  100 mg Oral DAILY    diphenhydrAMINE (BENADRYL) injection 12.5 mg  12.5 mg IntraVENous Q6H PRN    HYDROcodone-acetaminophen (NORCO) 7.5-325 mg per tablet 1 Tab  1 Tab Oral Q3H PRN    fentaNYL citrate (PF) injection 25 mcg  25 mcg IntraVENous Q6H PRN    acetaminophen (TYLENOL) tablet 650 mg  650 mg Oral Q6H PRN    cloNIDine (CATAPRES) 0.3 mg/24 hr patch 1 Patch  1 Patch TransDERmal Q7D    amLODIPine (NORVASC) tablet 10 mg  10 mg Oral DAILY    ferric citrate (AURYXIA) tablet 210 mg  210 mg Oral TID WITH MEALS    amitriptyline (ELAVIL) tablet 25 mg  25 mg Oral QHS    apixaban (ELIQUIS) tablet 5 mg  5 mg Oral BID    azaTHIOprine (IMURAN) tablet 50 mg  50 mg Oral DAILY AFTER BREAKFAST    gemfibrozil (LOPID) tablet 300 mg  300 mg Oral DAILY    hydroxychloroquine (PLAQUENIL) tablet 200 mg  200 mg Oral BID    pantoprazole (PROTONIX) tablet 40 mg  40 mg Oral ACB    polyethylene glycol (MIRALAX) packet 17 g  17 g Oral DAILY PRN    predniSONE (DELTASONE) tablet 10 mg  10 mg Oral DAILY    senna (SENOKOT) tablet 8.6 mg  1 Tab Oral DAILY PRN    sodium chloride (NS) flush 5-10 mL  5-10 mL IntraVENous Q8H    sodium chloride (NS) flush 5-10 mL  5-10 mL IntraVENous PRN    ondansetron (ZOFRAN) injection 4 mg  4 mg IntraVENous Q4H PRN    albuterol-ipratropium (DUO-NEB) 2.5 MG-0.5 MG/3 ML  3 mL Nebulization Q4H PRN    aspirin chewable tablet 81 mg  81 mg Oral DAILY    niCARdipine (CARDENE) 25 mg in 0.9% sodium chloride 250 mL infusion  0-15 mg/hr IntraVENous TITRATE     ______________________________________________________________________  EXPECTED LENGTH OF STAY: 2d 2h  ACTUAL LENGTH OF STAY:          5                 Uri Hale MD

## 2018-09-15 NOTE — PROGRESS NOTES
NAME: Kirill Blackwell        :  1985        MRN:  [id number]        Assessment :    Plan:  --[id number]-[place]  Anemia of ESRD  HTN- uncontrolled. On Cardened  SLE  SHPT   Seen on HD. Tolerating well. Using Revaclear dialyzer, 450 QB, R arm av access, 2k, 2.5 Ca  UF of 3 kg attempted. Tolerating ok. /101 on machine. Improved but still high    Her home meds have been titrated but there are discrepancy. She is on Coreg (metoprolol here). Will d/c Metoprolol and resume Coreg at 25 mg BID    She was d/c on Clonidine 0.3 mg TID PO during last admit (now on TTS no 3 patch). Losartan just increased    If she remains hypertensive, we can consider Minoxidil as next agent in 1-2 days (would give time for higher dose of losartan and now Coreg to work)    Next HD on Tue       Subjective:     Chief Complaint: seen on HD at 12:35 PM. No acute c/o. Tolerating hd well    Review of Systems:    Symptom Y/N Comments  Symptom Y/N Comments   Fever/Chills    Chest Pain n    Poor Appetite    Edema n    Cough    Abdominal Pain     Sputum    Joint Pain     SOB/IVY n   Pruritis/Rash     Nausea/vomit n   Tolerating PT/OT     Diarrhea    Tolerating Diet     Constipation    Other       Could not obtain due to:      Objective:     VITALS:   Last 24hrs VS reviewed since prior progress note.  Most recent are:  Visit Vitals    BP (!) 176/124    Pulse 83    Temp 97.3 °F (36.3 °C)    Resp 23    Ht 5' 5\" (1.651 m)    Wt 65.4 kg (144 lb 2.9 oz)    SpO2 100%    BMI 23.99 kg/m2       Intake/Output Summary (Last 24 hours) at 09/15/18 1314  Last data filed at 18 1500   Gross per 24 hour   Intake              120 ml   Output                0 ml   Net              120 ml      Telemetry Reviewed:     PHYSICAL EXAM:  General: NAD  Clear  RRR  No edema      Lab Data Reviewed: (see below)    Medications Reviewed: (see below)    PMH/SH reviewed - no change compared to H&P  ________________________________________________________________________  Care Plan discussed with:  Patient y    Family      RN y    Care Manager                    Consultant:          Comments   >50% of visit spent in counseling and coordination of care       ________________________________________________________________________  Derek Patiño MD     Procedures: see electronic medical records for all procedures/Xrays and details which  were not copied into this note but were reviewed prior to creation of Plan. LABS:  Recent Labs      09/15/18   0508  09/13/18   0717   WBC  2.5*  2.1*   HGB  9.7*  10.3*   HCT  31.9*  34.4*   PLT  154  168     Recent Labs      09/15/18   0508  09/14/18   0426  09/13/18 0717   NA  137  140  136   K  4.2  3.8  4.6   CL  100  100  98   CO2  25  28  24   BUN  81*  50*  72*   CREA  8.02*  6.03*  8.16*   GLU  86  89  93   CA  7.6*  8.0*  7.8*   PHOS   --    --   6.6*     Recent Labs      09/13/18   0717   ALB  3.1*     No results for input(s): INR, PTP, APTT in the last 72 hours. No lab exists for component: INREXT, INREXT   No results for input(s): FE, TIBC, PSAT, FERR in the last 72 hours.    Lab Results   Component Value Date/Time    Folate 13.7 06/17/2018 03:00 PM      MEDICATIONS:  Current Facility-Administered Medications   Medication Dose Route Frequency    carvedilol (COREG) tablet 25 mg  25 mg Oral BID WITH MEALS    losartan (COZAAR) tablet 100 mg  100 mg Oral DAILY    diphenhydrAMINE (BENADRYL) injection 12.5 mg  12.5 mg IntraVENous Q6H PRN    HYDROcodone-acetaminophen (NORCO) 7.5-325 mg per tablet 1 Tab  1 Tab Oral Q3H PRN    fentaNYL citrate (PF) injection 25 mcg  25 mcg IntraVENous Q6H PRN    acetaminophen (TYLENOL) tablet 650 mg  650 mg Oral Q6H PRN    cloNIDine (CATAPRES) 0.3 mg/24 hr patch 1 Patch  1 Patch TransDERmal Q7D    amLODIPine (NORVASC) tablet 10 mg  10 mg Oral DAILY    ferric citrate (AURYXIA) tablet 210 mg  210 mg Oral TID WITH MEALS    amitriptyline (ELAVIL) tablet 25 mg  25 mg Oral QHS    apixaban (ELIQUIS) tablet 5 mg  5 mg Oral BID    azaTHIOprine (IMURAN) tablet 50 mg  50 mg Oral DAILY AFTER BREAKFAST    gemfibrozil (LOPID) tablet 300 mg  300 mg Oral DAILY    hydroxychloroquine (PLAQUENIL) tablet 200 mg  200 mg Oral BID    pantoprazole (PROTONIX) tablet 40 mg  40 mg Oral ACB    polyethylene glycol (MIRALAX) packet 17 g  17 g Oral DAILY PRN    predniSONE (DELTASONE) tablet 10 mg  10 mg Oral DAILY    senna (SENOKOT) tablet 8.6 mg  1 Tab Oral DAILY PRN    sodium chloride (NS) flush 5-10 mL  5-10 mL IntraVENous Q8H    sodium chloride (NS) flush 5-10 mL  5-10 mL IntraVENous PRN    ondansetron (ZOFRAN) injection 4 mg  4 mg IntraVENous Q4H PRN    albuterol-ipratropium (DUO-NEB) 2.5 MG-0.5 MG/3 ML  3 mL Nebulization Q4H PRN    aspirin chewable tablet 81 mg  81 mg Oral DAILY    niCARdipine (CARDENE) 25 mg in 0.9% sodium chloride 250 mL infusion  0-15 mg/hr IntraVENous TITRATE

## 2018-09-15 NOTE — PROGRESS NOTES
[facility]  [phone]    Vitals   Pre   Post   Assessment   Pre   Post     Temp  97.3  97.5 LOC  Alert and Oriented x 3 Alert and Oriented x 3   HR   74 97 Lungs   Tachypneic  Tachypneic   B/P   131/91 150/97 Cardiac   S1 S2  S1 S2   Resp   26 21 Skin   Dry and Intact  Dry and Intact   Pain level  0/10 0/10 Edema  Generalized trace     Generalized trace   Orders:    Duration:   Start:    9500 End:    1453 Total:   3.5 hours   Dialyzer:   Dialyzer/Set Up Inspection: Revaclear (09/15/18 1114)   K Bath:   Dialysate K (mEq/L): 2 (09/15/18 1114)   Ca Bath:   Dialysate CA (mEq/L): 2.5 (09/15/18 1114)   Na/Bicarb:   Dialysate NA (mEq/L): 140 (09/15/18 1114)   Target Fluid Removal:   3500 ml. Access     Type & Location:   PETER AV Graft   Labs     Obtained/Reviewed   Critical Results Called   Date when labs were drawn-  Hgb-    HGB   Date Value Ref Range Status   09/15/2018 9.7 (L) 11.5 - 16.0 g/dL Final     K-    Potassium   Date Value Ref Range Status   09/15/2018 4.2 3.5 - 5.1 mmol/L Final     Ca-   Calcium   Date Value Ref Range Status   09/15/2018 7.6 (L) 8.5 - 10.1 MG/DL Final     Bun-   BUN   Date Value Ref Range Status   09/15/2018 81 (H) 6 - 20 MG/DL Final     Comment:     INVESTIGATED PER DELTA CHECK PROTOCOL     Creat-   Creatinine   Date Value Ref Range Status   09/15/2018 8.02 (H) 0.55 - 1.02 MG/DL Final     Comment:     INVESTIGATED PER DELTA CHECK PROTOCOL        Medications/ Blood Products Given     Name   Dose   Route and Time                     Blood Volume Processed (BVP):    83.7 Net Fluid   Removed:  3000 ml. Comments   Time Out Done: Yes @ 1045  Primary Nurse Rpt Pre: Masood Reina RN  Primary Nurse Rpt Post: Masood Reina RN  Pt Education:  Care Plan:  Tx Summary: Patient tolerated treatment well. At the end of treatment, all possible blood returned with NS 0.9%  Needles removed x 2. Pressure held to sites until bleeding stopped.    Secured with gauze and tape.  Report given to Melanie Austin RN. ---------------Mami Esparza RN. Admiting Diagnosis:  Pt's previous clinic-  Consent signed - Informed Consent Verified: Yes (09/13/18 1345)  [place] Consent - Yes  Hepatitis Status- Hep B Negative and Immune 6/12/18  Machine #- Machine Number: [id number] (09/15/18 1114)  Telemetry status-  Pre-dialysis wt. - Pre-Dialysis Weight: 62.7 kg (138 lb 3.7 oz) (09/11/18 8619)

## 2018-09-16 LAB — PERIPHERAL SMEAR,PSM: NORMAL

## 2018-09-16 PROCEDURE — 74011250636 HC RX REV CODE- 250/636: Performed by: INTERNAL MEDICINE

## 2018-09-16 PROCEDURE — 74011250636 HC RX REV CODE- 250/636: Performed by: FAMILY MEDICINE

## 2018-09-16 PROCEDURE — 74011636637 HC RX REV CODE- 636/637: Performed by: INTERNAL MEDICINE

## 2018-09-16 PROCEDURE — 65660000000 HC RM CCU STEPDOWN

## 2018-09-16 PROCEDURE — 36415 COLL VENOUS BLD VENIPUNCTURE: CPT | Performed by: INTERNAL MEDICINE

## 2018-09-16 PROCEDURE — 74011250637 HC RX REV CODE- 250/637: Performed by: INTERNAL MEDICINE

## 2018-09-16 PROCEDURE — 74011000250 HC RX REV CODE- 250: Performed by: INTERNAL MEDICINE

## 2018-09-16 RX ORDER — DEXAMETHASONE SODIUM PHOSPHATE 10 MG/ML
6 INJECTION INTRAMUSCULAR; INTRAVENOUS ONCE
Status: COMPLETED | OUTPATIENT
Start: 2018-09-16 | End: 2018-09-16

## 2018-09-16 RX ORDER — MINOXIDIL 2.5 MG/1
2.5 TABLET ORAL DAILY
Status: DISCONTINUED | OUTPATIENT
Start: 2018-09-16 | End: 2018-09-17

## 2018-09-16 RX ADMIN — CARVEDILOL 25 MG: 12.5 TABLET, FILM COATED ORAL at 17:05

## 2018-09-16 RX ADMIN — DIPHENHYDRAMINE HYDROCHLORIDE 12.5 MG: 50 INJECTION, SOLUTION INTRAMUSCULAR; INTRAVENOUS at 05:05

## 2018-09-16 RX ADMIN — Medication 10 ML: at 05:08

## 2018-09-16 RX ADMIN — FERRIC CITRATE 210 MG: 210 TABLET, COATED ORAL at 08:18

## 2018-09-16 RX ADMIN — Medication 10 ML: at 14:00

## 2018-09-16 RX ADMIN — FENTANYL CITRATE 12.5 MCG: 50 INJECTION, SOLUTION INTRAMUSCULAR; INTRAVENOUS at 16:46

## 2018-09-16 RX ADMIN — LOSARTAN POTASSIUM 100 MG: 50 TABLET ORAL at 08:16

## 2018-09-16 RX ADMIN — HYDROCODONE BITARTRATE AND ACETAMINOPHEN 1 TABLET: 7.5; 325 TABLET ORAL at 04:43

## 2018-09-16 RX ADMIN — PREDNISONE 10 MG: 5 TABLET ORAL at 08:16

## 2018-09-16 RX ADMIN — DIPHENHYDRAMINE HYDROCHLORIDE 12.5 MG: 50 INJECTION, SOLUTION INTRAMUSCULAR; INTRAVENOUS at 12:13

## 2018-09-16 RX ADMIN — DEXAMETHASONE SODIUM PHOSPHATE 6 MG: 10 INJECTION, SOLUTION INTRAMUSCULAR; INTRAVENOUS at 15:33

## 2018-09-16 RX ADMIN — AMLODIPINE BESYLATE 10 MG: 5 TABLET ORAL at 08:16

## 2018-09-16 RX ADMIN — HYDROCODONE BITARTRATE AND ACETAMINOPHEN 1 TABLET: 7.5; 325 TABLET ORAL at 13:22

## 2018-09-16 RX ADMIN — AZATHIOPRINE 50 MG: 50 TABLET ORAL at 08:18

## 2018-09-16 RX ADMIN — FENTANYL CITRATE 12.5 MCG: 50 INJECTION, SOLUTION INTRAMUSCULAR; INTRAVENOUS at 07:30

## 2018-09-16 RX ADMIN — ASPIRIN 81 MG CHEWABLE TABLET 81 MG: 81 TABLET CHEWABLE at 08:16

## 2018-09-16 RX ADMIN — APIXABAN 5 MG: 5 TABLET, FILM COATED ORAL at 08:16

## 2018-09-16 RX ADMIN — HYDROXYCHLOROQUINE SULFATE 200 MG: 200 TABLET, FILM COATED ORAL at 17:05

## 2018-09-16 RX ADMIN — HYDROXYCHLOROQUINE SULFATE 200 MG: 200 TABLET, FILM COATED ORAL at 08:17

## 2018-09-16 RX ADMIN — FERRIC CITRATE 210 MG: 210 TABLET, COATED ORAL at 12:13

## 2018-09-16 RX ADMIN — CARVEDILOL 25 MG: 12.5 TABLET, FILM COATED ORAL at 08:16

## 2018-09-16 RX ADMIN — AMITRIPTYLINE HYDROCHLORIDE 25 MG: 50 TABLET, FILM COATED ORAL at 22:40

## 2018-09-16 RX ADMIN — APIXABAN 5 MG: 5 TABLET, FILM COATED ORAL at 17:05

## 2018-09-16 RX ADMIN — Medication 10 ML: at 22:40

## 2018-09-16 RX ADMIN — GEMFIBROZIL 300 MG: 600 TABLET ORAL at 08:17

## 2018-09-16 RX ADMIN — MINOXIDIL 2.5 MG: 2.5 TABLET ORAL at 13:22

## 2018-09-16 RX ADMIN — PANTOPRAZOLE SODIUM 40 MG: 40 TABLET, DELAYED RELEASE ORAL at 07:30

## 2018-09-16 RX ADMIN — SODIUM CHLORIDE 5 MG/HR: 900 INJECTION, SOLUTION INTRAVENOUS at 04:50

## 2018-09-16 RX ADMIN — DIPHENHYDRAMINE HYDROCHLORIDE 12.5 MG: 50 INJECTION, SOLUTION INTRAMUSCULAR; INTRAVENOUS at 20:40

## 2018-09-16 RX ADMIN — FERRIC CITRATE 210 MG: 210 TABLET, COATED ORAL at 17:05

## 2018-09-16 NOTE — PROGRESS NOTES
[facility] at [address], [address] [place] [address]  Kathleen Ponces: [phone]  F: [phone]        Reason for Visit:   Jesica Wagoner is a 29 y.o. female who is seen for follow-up of leukopenia. Interval History:   She reports feeling better today. Still with some aches and pain. Headaches resolved. BP better and drip stopped. Dialysis went fine by her report yesterday. No new complaints. Medications reviewed in the EMR. Allergies   Allergen Reactions    Compazine [Prochlorperazine Edisylate] Nausea and Vomiting and Palpitations        Review of Systems: A 6-point review of systems was obtained, negative except as reviewed in the HPI. Physical Exam:     Visit Vitals    /76 (BP 1 Location: Left arm, BP Patient Position: At rest)    Pulse 82    Temp 97.3 °F (36.3 °C)    Resp 22    Ht 5' 5\" (1.651 m)    Wt 139 lb 15.9 oz (63.5 kg)    SpO2 100%    BMI 23.3 kg/m2     General: No distress  Eyes: Anicteric sclerae  HENT: Atraumatic  Neck: Supple  Respiratory: Normal respiratory effort  CV: No peripheral edema  GI: Soft, nontender, nondistended, no masses, no hepatomegaly, no splenomegaly  Skin: No rashes, ecchymoses, or petechiae  Psych: Alert, oriented, appropriate affect, normal judgment/insight      Results:     Lab Results   Component Value Date/Time    WBC 2.5 (L) 09/15/2018 05:08 AM    HGB 9.7 (L) 09/15/2018 05:08 AM    HCT 31.9 (L) 09/15/2018 05:08 AM    PLATELET 914 70/77/4223 05:08 AM    MCV 93.3 09/15/2018 05:08 AM    ABS.  NEUTROPHILS 1.2 (L) 09/13/2018 07:17 AM    Hemoglobin (POC) 7.8 (L) 01/19/2018 12:30 PM    Hematocrit (POC) 23 (L) 01/19/2018 12:30 PM     Lab Results   Component Value Date/Time    Sodium 137 09/15/2018 05:08 AM    Potassium 4.2 09/15/2018 05:08 AM    Chloride 100 09/15/2018 05:08 AM    CO2 25 09/15/2018 05:08 AM    Glucose 86 09/15/2018 05:08 AM    BUN 81 (H) 09/15/2018 05:08 AM    Creatinine 8.02 (H) 09/15/2018 05:08 AM    GFR est AA 7 (L) 09/15/2018 05:08 AM    GFR est non-AA 6 (L) 09/15/2018 05:08 AM    Calcium 7.6 (L) 09/15/2018 05:08 AM    Sodium (POC) 137 01/19/2018 12:30 PM    Potassium (POC) 4.4 01/19/2018 12:30 PM    Chloride (POC) 96 (L) 01/19/2018 12:30 PM    Glucose (POC) 104 (H) 08/16/2018 11:06 AM    BUN (POC) 24 (H) 01/19/2018 12:30 PM    Creatinine (POC) 5.5 (H) 01/19/2018 12:30 PM    Calcium, ionized (POC) 1.14 01/19/2018 12:30 PM     Lab Results   Component Value Date/Time    Bilirubin, total 0.5 09/11/2018 06:17 AM    ALT (SGPT) 23 09/11/2018 06:17 AM    AST (SGOT) 27 09/11/2018 06:17 AM    Alk. phosphatase 69 09/11/2018 06:17 AM    Protein, total 7.9 09/11/2018 06:17 AM    Albumin 3.1 (L) 09/13/2018 07:17 AM    Globulin 4.7 (H) 09/11/2018 06:17 AM     Lab Results   Component Value Date/Time    Reticulocyte count 0.7 03/15/2018 04:56 AM    Iron % saturation 25 06/17/2018 03:00 PM    TIBC 137 (L) 06/17/2018 03:00 PM    Ferritin 4182 (H) 06/17/2018 03:00 PM    Vitamin B12 >2000 (H) 06/17/2018 03:00 PM    Folate 13.7 06/17/2018 03:00 PM    Haptoglobin 225 (H) 03/15/2018 10:32 AM    Haptoglobin 165 05/30/2009 08:50 PM     (H) 05/13/2018 06:47 AM    Sed rate (ESR) 15 08/18/2016 11:46 AM    Sed rate, automated 54 (H) 03/18/2018 03:06 AM    C-Reactive protein 0.60 06/11/2012 09:30 PM    C-Reactive Protein, Qt 3.3 08/18/2016 11:46 AM    TSH 1.70 09/11/2018 06:17 AM    M-Darian Not Observed 03/07/2014 02:16 PM    JIN, Direct Detected (A) 10/25/2010 10:15 AM    Antinuclear Antibodies Direct Positive (A) 04/24/2013 09:10 AM    Lipase 376 09/11/2018 06:17 AM    Hep C  virus Ab Interp.  NONREACTIVE 12/14/2017 09:40 AM    HEP C VIRUS AB <0.1 10/27/2015 03:17 PM    HIV 1/O/2 Abs <1.00 10/27/2015 03:17 PM     Lab Results   Component Value Date/Time    INR 1.1 05/13/2018 09:51 AM    aPTT 30.7 05/13/2018 09:51 AM    D-dimer 15.97 (H) 01/01/2016 05:01 PM    D-Dimer 2.93 (H) 04/16/2014 11:25 AM    Fibrinogen 261 05/31/2009 10:30 AM       3/15/2018:  Normocytic, Normchromic anemia with increased anisopoikilocytosis. Thrombocytopenia without  platelets aggregation. Neutrophils   demonstrate reactive features. 9/16/2018  Smear: pending      Assessment/Recommendations:   1) Leukopenia (neutropenia and lymphopenia)  Mild, with ANC 1.2. This appears to be a chronic/intermittent issue dating back to at least 2009. It is most likely secondary to her lupus and related medications (both azathioprine and plaquenil are associated with leukopenia). A primary hematologic disorder is unlikely. I have ordered a peripheral smear, results are pending. Assuming this is ok, I don't think further testing is needed at this time. Monitor, consider bone marrow biopsy if ANC falls persistently <1k. 2) Anemia of chronic disease and kidney disease  Monitor and continue treatment of under underlying medical problems. EPO PRN with dialysis. 3) H/o PE  Continue anticoagulation long-term.       Signed By: Mando Santos MD

## 2018-09-16 NOTE — PROGRESS NOTES
Hospitalist Progress Note  Luis Deshpande MD  Answering service: [phone] OR [phone] from in house phone      Date of Service:  2018  NAME:  Jesica Wagoner  :  1985  MRN:  [id number]      Admission Summary:   30 yo woman with dyslipidemia, h/o pulmonary embolism, ESRD on TTS HD, chronic pain syndrome, asthma, systemic lupus erythematosus presented to the ED from home on 9/10/18 with progressive fatigue, generalized body aches, abdominal pain, chest pain. She was admitted to [facility] with hypertensive urgency.      Interval history / Subjective:   Pain and headache today and not liking the fentanyl wean; off nicardipine gtt since 1000 but BP back up despite med adjustment by Renal; d/w nurse, just finished HD     Assessment & Plan:     Hypertensive urgency (POA) - has been on and off nicardipine gtt and home meds being adjusted to try and wean off nicardipine gtt  - CT head wo contrast 9/10 unremarkable  - pt reports very difficult to control BP despite medication adherence; will need adjustments to her home medication at the time of discharge  - keep in IMCU due to prn nicardipine gtt  - nursing staff, pt, and HD nurse advised not to hold BP meds prior to HD  - start minoxidil today    Elevated troponin with CP (POA) - nonspecific with ESRD and ECG without evidence of ACS  - Cards following  - TTE  ED 50-55%, no RWMA, increased LV wall thickness, mild-mod MR, mild TR  - continue BB, ARB    Dyslipidemia - LDL 60 but ; resumed home gemfibrozil    Chronic PE - continue apixaban    ESRD on HD - TTS HD as per Renal    Hyperkalemia - likely due to ESRD; kayexalate prn    Leukopenia, mild, intermittent (POA) - patient is asymptomatic  - Heme/Onc consulted; PBS pending  - likely due to SLE and medications per Heme/Onc     Chronic pain syndrome and chronic back pain - pain control, continue daily prednisone    SLE with exacerbation (POA) - continue home Plaquenil, prednisone, azathioprine  - pain control  - weaning off IV opiate  - will try dexamethasone 6mg IV x1     Asthma - controlled, nebs prn    Code status: full  DVT prophylaxis: SCDs    Care Plan discussed with: Patient/Family and Nurse  Disposition: TBD     Hospital Problems  Date Reviewed: 9/10/2018          Codes Class Noted POA    Neutropenia ([facility] [address] ICD-10-CM: D70.9  ICD-9-CM: 288.00  9/15/2018 Unknown        Anemia in chronic kidney disease ICD-10-CM: N18.9, D63.1  ICD-9-CM: 285.21  9/15/2018 Unknown        * (Principal)Malignant hypertensive urgency ICD-10-CM: I16.0  ICD-9-CM: 401.0  9/10/2018 Yes                Review of Systems:   Pertinent items are noted in HPI. Vital Signs:    Last 24hrs VS reviewed since prior progress note.  Most recent are:  Visit Vitals    /76 (BP 1 Location: Left arm, BP Patient Position: At rest)    Pulse 82    Temp 97.3 °F (36.3 °C)    Resp 22    Ht 5' 5\" (1.651 m)    Wt 63.5 kg (139 lb 15.9 oz)    SpO2 100%    BMI 23.3 kg/m2         Intake/Output Summary (Last 24 hours) at 09/16/18 1224  Last data filed at 09/15/18 1845   Gross per 24 hour   Intake              480 ml   Output             3000 ml   Net            -2520 ml        Physical Examination:     Constitutional:  awake, no acute distress, cooperative, pleasant    ENT:  oral mucosa moist, oropharynx benign    Resp:  diminished BS   CV:  regular rhythm, normal rate, no m/r/g appreciated, RUE and b/l LE edema, RUE AVG +thrill/bruit    GI:  +BS, soft, non distended, NT    Musculoskeletal:  HENDRICKSON    Neurologic:  AAOx3, NFD                                             Skin:  warm, dry; multiple healed abdominal surgical scars      Data Review:    Review and/or order of clinical lab test  Review and/or order of tests in the radiology section of CPT  Review and/or order of tests in the medicine section of CPT      Labs:     Recent Labs      09/15/18   0508   WBC  2.5*   HGB  9.7*   HCT  31.9* PLT  154     Recent Labs      09/15/18   0508  09/14/18   0426   NA  137  140   K  4.2  3.8   CL  100  100   CO2  25  28   BUN  81*  50*   CREA  8.02*  6.03*   GLU  86  89   CA  7.6*  8.0*     No results for input(s): SGOT, GPT, ALT, AP, TBIL, TBILI, TP, ALB, GLOB, GGT, AML, LPSE in the last 72 hours. No lab exists for component: AMYP, HLPSE  No results for input(s): INR, PTP, APTT in the last 72 hours. No lab exists for component: INREXT, INREXT   No results for input(s): FE, TIBC, PSAT, FERR in the last 72 hours. Lab Results   Component Value Date/Time    Folate 13.7 06/17/2018 03:00 PM      No results for input(s): PH, PCO2, PO2 in the last 72 hours. No results for input(s): CPK, CKNDX, TROIQ in the last 72 hours.     No lab exists for component: CPKMB  Lab Results   Component Value Date/Time    Cholesterol, total 140 09/11/2018 06:17 AM    HDL Cholesterol 43 09/11/2018 06:17 AM    LDL, calculated 60.4 09/11/2018 06:17 AM    Triglyceride 183 (H) 09/11/2018 06:17 AM    CHOL/HDL Ratio 3.3 09/11/2018 06:17 AM     Lab Results   Component Value Date/Time    Glucose (POC) 104 (H) 08/16/2018 11:06 AM    Glucose (POC) 164 (H) 07/22/2018 11:43 AM    Glucose (POC) 138 (H) 07/21/2018 09:22 PM    Glucose (POC) 111 (H) 07/21/2018 04:57 PM    Glucose (POC) 105 (H) 07/21/2018 11:38 AM     Lab Results   Component Value Date/Time    Color YELLOW/STRAW 08/12/2016 09:06 PM    Appearance CLEAR 08/12/2016 09:06 PM    Specific gravity 1.017 08/12/2016 09:06 PM    Specific gravity 1.010 07/11/2016 12:44 PM    pH (UA) 7.0 08/12/2016 09:06 PM    Protein 300 (A) 08/12/2016 09:06 PM    Glucose NEGATIVE  08/12/2016 09:06 PM    Ketone TRACE (A) 08/12/2016 09:06 PM    Bilirubin NEGATIVE  07/11/2016 12:44 PM    Urobilinogen 1.0 08/12/2016 09:06 PM    Nitrites NEGATIVE  08/12/2016 09:06 PM    Leukocyte Esterase NEGATIVE  08/12/2016 09:06 PM    Epithelial cells MODERATE (A) 08/12/2016 09:06 PM    Bacteria 1+ (A) 08/12/2016 09:06 PM WBC 0-4 08/12/2016 09:06 PM    RBC 0-5 08/12/2016 09:06 PM         Medications Reviewed:     Current Facility-Administered Medications   Medication Dose Route Frequency    carvedilol (COREG) tablet 25 mg  25 mg Oral BID WITH MEALS    fentaNYL citrate (PF) injection 12.5 mcg  12.5 mcg IntraVENous Q8H PRN    losartan (COZAAR) tablet 100 mg  100 mg Oral DAILY    diphenhydrAMINE (BENADRYL) injection 12.5 mg  12.5 mg IntraVENous Q6H PRN    HYDROcodone-acetaminophen (NORCO) 7.5-325 mg per tablet 1 Tab  1 Tab Oral Q3H PRN    acetaminophen (TYLENOL) tablet 650 mg  650 mg Oral Q6H PRN    cloNIDine (CATAPRES) 0.3 mg/24 hr patch 1 Patch  1 Patch TransDERmal Q7D    amLODIPine (NORVASC) tablet 10 mg  10 mg Oral DAILY    ferric citrate (AURYXIA) tablet 210 mg  210 mg Oral TID WITH MEALS    amitriptyline (ELAVIL) tablet 25 mg  25 mg Oral QHS    apixaban (ELIQUIS) tablet 5 mg  5 mg Oral BID    azaTHIOprine (IMURAN) tablet 50 mg  50 mg Oral DAILY AFTER BREAKFAST    gemfibrozil (LOPID) tablet 300 mg  300 mg Oral DAILY    hydroxychloroquine (PLAQUENIL) tablet 200 mg  200 mg Oral BID    pantoprazole (PROTONIX) tablet 40 mg  40 mg Oral ACB    polyethylene glycol (MIRALAX) packet 17 g  17 g Oral DAILY PRN    predniSONE (DELTASONE) tablet 10 mg  10 mg Oral DAILY    senna (SENOKOT) tablet 8.6 mg  1 Tab Oral DAILY PRN    sodium chloride (NS) flush 5-10 mL  5-10 mL IntraVENous Q8H    sodium chloride (NS) flush 5-10 mL  5-10 mL IntraVENous PRN    ondansetron (ZOFRAN) injection 4 mg  4 mg IntraVENous Q4H PRN    albuterol-ipratropium (DUO-NEB) 2.5 MG-0.5 MG/3 ML  3 mL Nebulization Q4H PRN    aspirin chewable tablet 81 mg  81 mg Oral DAILY    niCARdipine (CARDENE) 25 mg in 0.9% sodium chloride 250 mL infusion  0-15 mg/hr IntraVENous TITRATE     ______________________________________________________________________  EXPECTED LENGTH OF STAY: 2d 2h  ACTUAL LENGTH OF STAY:          6                 Selvin Reyes MD

## 2018-09-16 NOTE — PROGRESS NOTES
Problem: Hypertension  Goal: *Blood pressure within specified parameters  Outcome: Progressing Towards Goal  Antihypertensives in treatment plan, BP is within defined limits. will continue to monitor.

## 2018-09-16 NOTE — PROGRESS NOTES
Bedside shift change report given to Padma BOSCH (oncoming nurse) by Wei Garay RN (offgoing nurse). Report included the following information SBAR, Intake/Output, MAR, Recent Results, Med Rec Status and Cardiac Rhythm NSR.

## 2018-09-17 LAB
ALBUMIN SERPL-MCNC: 3.1 G/DL (ref 3.5–5)
ANION GAP SERPL CALC-SCNC: 11 MMOL/L (ref 5–15)
BASOPHILS # BLD: 0 K/UL (ref 0–0.1)
BASOPHILS NFR BLD: 0 % (ref 0–1)
BUN SERPL-MCNC: 70 MG/DL (ref 6–20)
BUN/CREAT SERPL: 10 (ref 12–20)
CALCIUM SERPL-MCNC: 8.2 MG/DL (ref 8.5–10.1)
CHLORIDE SERPL-SCNC: 98 MMOL/L (ref 97–108)
CO2 SERPL-SCNC: 25 MMOL/L (ref 21–32)
CREAT SERPL-MCNC: 7.27 MG/DL (ref 0.55–1.02)
DIFFERENTIAL METHOD BLD: ABNORMAL
EOSINOPHIL # BLD: 0 K/UL (ref 0–0.4)
EOSINOPHIL NFR BLD: 0 % (ref 0–7)
ERYTHROCYTE [DISTWIDTH] IN BLOOD BY AUTOMATED COUNT: 14.7 % (ref 11.5–14.5)
GLUCOSE SERPL-MCNC: 108 MG/DL (ref 65–100)
HCT VFR BLD AUTO: 32.4 % (ref 35–47)
HGB BLD-MCNC: 10 G/DL (ref 11.5–16)
IMM GRANULOCYTES # BLD: 0 K/UL (ref 0–0.04)
IMM GRANULOCYTES NFR BLD AUTO: 0 % (ref 0–0.5)
LYMPHOCYTES # BLD: 0.5 K/UL (ref 0.8–3.5)
LYMPHOCYTES NFR BLD: 24 % (ref 12–49)
MAGNESIUM SERPL-MCNC: 1.9 MG/DL (ref 1.6–2.4)
MCH RBC QN AUTO: 28.4 PG (ref 26–34)
MCHC RBC AUTO-ENTMCNC: 30.9 G/DL (ref 30–36.5)
MCV RBC AUTO: 92 FL (ref 80–99)
MONOCYTES # BLD: 0.2 K/UL (ref 0–1)
MONOCYTES NFR BLD: 9 % (ref 5–13)
NEUTS SEG # BLD: 1.5 K/UL (ref 1.8–8)
NEUTS SEG NFR BLD: 67 % (ref 32–75)
NRBC # BLD: 0 K/UL (ref 0–0.01)
NRBC BLD-RTO: 0 PER 100 WBC
PHOSPHATE SERPL-MCNC: 4.1 MG/DL (ref 2.6–4.7)
PLATELET # BLD AUTO: 178 K/UL (ref 150–400)
PMV BLD AUTO: 11.6 FL (ref 8.9–12.9)
POTASSIUM SERPL-SCNC: 4.1 MMOL/L (ref 3.5–5.1)
RBC # BLD AUTO: 3.52 M/UL (ref 3.8–5.2)
RBC MORPH BLD: ABNORMAL
SODIUM SERPL-SCNC: 134 MMOL/L (ref 136–145)
WBC # BLD AUTO: 2.2 K/UL (ref 3.6–11)

## 2018-09-17 PROCEDURE — 74011250637 HC RX REV CODE- 250/637: Performed by: INTERNAL MEDICINE

## 2018-09-17 PROCEDURE — 65660000000 HC RM CCU STEPDOWN

## 2018-09-17 PROCEDURE — 74011250636 HC RX REV CODE- 250/636: Performed by: INTERNAL MEDICINE

## 2018-09-17 PROCEDURE — 74011636637 HC RX REV CODE- 636/637: Performed by: INTERNAL MEDICINE

## 2018-09-17 PROCEDURE — 80069 RENAL FUNCTION PANEL: CPT | Performed by: INTERNAL MEDICINE

## 2018-09-17 PROCEDURE — 74011250636 HC RX REV CODE- 250/636: Performed by: FAMILY MEDICINE

## 2018-09-17 PROCEDURE — 36415 COLL VENOUS BLD VENIPUNCTURE: CPT | Performed by: INTERNAL MEDICINE

## 2018-09-17 PROCEDURE — 83735 ASSAY OF MAGNESIUM: CPT | Performed by: INTERNAL MEDICINE

## 2018-09-17 PROCEDURE — 85025 COMPLETE CBC W/AUTO DIFF WBC: CPT | Performed by: INTERNAL MEDICINE

## 2018-09-17 RX ORDER — CLONIDINE HYDROCHLORIDE 0.2 MG/1
0.2 TABLET ORAL 2 TIMES DAILY
Status: DISCONTINUED | OUTPATIENT
Start: 2018-09-17 | End: 2018-09-19 | Stop reason: HOSPADM

## 2018-09-17 RX ORDER — MINOXIDIL 2.5 MG/1
2.5 TABLET ORAL 2 TIMES DAILY
Status: DISCONTINUED | OUTPATIENT
Start: 2018-09-17 | End: 2018-09-19 | Stop reason: HOSPADM

## 2018-09-17 RX ADMIN — APIXABAN 5 MG: 5 TABLET, FILM COATED ORAL at 17:23

## 2018-09-17 RX ADMIN — FERRIC CITRATE 210 MG: 210 TABLET, COATED ORAL at 19:23

## 2018-09-17 RX ADMIN — Medication 10 ML: at 14:00

## 2018-09-17 RX ADMIN — HYDROXYCHLOROQUINE SULFATE 200 MG: 200 TABLET, FILM COATED ORAL at 08:05

## 2018-09-17 RX ADMIN — FENTANYL CITRATE 12.5 MCG: 50 INJECTION, SOLUTION INTRAMUSCULAR; INTRAVENOUS at 12:05

## 2018-09-17 RX ADMIN — DIPHENHYDRAMINE HYDROCHLORIDE 12.5 MG: 50 INJECTION, SOLUTION INTRAMUSCULAR; INTRAVENOUS at 08:51

## 2018-09-17 RX ADMIN — HYDROCODONE BITARTRATE AND ACETAMINOPHEN 1 TABLET: 7.5; 325 TABLET ORAL at 10:00

## 2018-09-17 RX ADMIN — MINOXIDIL 2.5 MG: 2.5 TABLET ORAL at 17:23

## 2018-09-17 RX ADMIN — FERRIC CITRATE 210 MG: 210 TABLET, COATED ORAL at 12:02

## 2018-09-17 RX ADMIN — ONDANSETRON 4 MG: 2 INJECTION INTRAMUSCULAR; INTRAVENOUS at 07:30

## 2018-09-17 RX ADMIN — FENTANYL CITRATE 12.5 MCG: 50 INJECTION, SOLUTION INTRAMUSCULAR; INTRAVENOUS at 01:15

## 2018-09-17 RX ADMIN — CARVEDILOL 25 MG: 12.5 TABLET, FILM COATED ORAL at 17:23

## 2018-09-17 RX ADMIN — DIPHENHYDRAMINE HYDROCHLORIDE 12.5 MG: 50 INJECTION, SOLUTION INTRAMUSCULAR; INTRAVENOUS at 17:23

## 2018-09-17 RX ADMIN — APIXABAN 5 MG: 5 TABLET, FILM COATED ORAL at 08:05

## 2018-09-17 RX ADMIN — CLONIDINE HYDROCHLORIDE 0.2 MG: 0.2 TABLET ORAL at 17:23

## 2018-09-17 RX ADMIN — Medication 10 ML: at 21:05

## 2018-09-17 RX ADMIN — CARVEDILOL 25 MG: 12.5 TABLET, FILM COATED ORAL at 07:29

## 2018-09-17 RX ADMIN — AZATHIOPRINE 50 MG: 50 TABLET ORAL at 08:05

## 2018-09-17 RX ADMIN — PANTOPRAZOLE SODIUM 40 MG: 40 TABLET, DELAYED RELEASE ORAL at 07:29

## 2018-09-17 RX ADMIN — AMITRIPTYLINE HYDROCHLORIDE 25 MG: 50 TABLET, FILM COATED ORAL at 21:04

## 2018-09-17 RX ADMIN — FERRIC CITRATE 210 MG: 210 TABLET, COATED ORAL at 08:05

## 2018-09-17 RX ADMIN — HYDROXYCHLOROQUINE SULFATE 200 MG: 200 TABLET, FILM COATED ORAL at 17:23

## 2018-09-17 RX ADMIN — HYDROCODONE BITARTRATE AND ACETAMINOPHEN 1 TABLET: 7.5; 325 TABLET ORAL at 21:04

## 2018-09-17 RX ADMIN — LOSARTAN POTASSIUM 100 MG: 50 TABLET ORAL at 08:05

## 2018-09-17 RX ADMIN — GEMFIBROZIL 300 MG: 600 TABLET ORAL at 08:05

## 2018-09-17 RX ADMIN — MINOXIDIL 2.5 MG: 2.5 TABLET ORAL at 08:05

## 2018-09-17 RX ADMIN — Medication 10 ML: at 07:30

## 2018-09-17 RX ADMIN — PREDNISONE 10 MG: 5 TABLET ORAL at 08:05

## 2018-09-17 RX ADMIN — ASPIRIN 81 MG CHEWABLE TABLET 81 MG: 81 TABLET CHEWABLE at 08:05

## 2018-09-17 RX ADMIN — AMLODIPINE BESYLATE 10 MG: 5 TABLET ORAL at 08:50

## 2018-09-17 NOTE — PROGRESS NOTES
Heme/ONC  CBC review stable  Smear review likely medication effect  Monitoring as outpt by rheum. F/u with us prn.

## 2018-09-17 NOTE — PROGRESS NOTES
Hospitalist Progress Note  Deepika Can MD  Answering service: [phone] OR [phone] from in house phone      Date of Service:  2018  NAME:  Tiera Riggs YOB: 1985  MRN:  [id number]      Admission Summary:   28 yo woman with dyslipidemia, h/o pulmonary embolism, ESRD on TTS HD, chronic pain syndrome, asthma, systemic lupus erythematosus presented to the ED from home on 9/10/18 with progressive fatigue, generalized body aches, abdominal pain, chest pain. She was admitted to [facility] with hypertensive urgency.      Interval history / Subjective:   Pain still uncontrolled; off nicardipine gtt since 1000 yesterday but BP back up despite med adjustment by Renal; d/w nurse     Assessment & Plan:     Hypertensive urgency (POA) - has been on and off nicardipine gtt and home meds being adjusted to try and wean off nicardipine gtt  - CT head wo contrast 9/10 unremarkable  - pt reports very difficult to control BP despite medication adherence; will need adjustments to her home medication at the time of discharge  - keep in IMCU due to prn nicardipine gtt  - nursing staff, pt, and HD nurse advised not to hold BP meds prior to HD  - started minoxidil and titrating up    Elevated troponin with CP (POA) - nonspecific with ESRD and ECG without evidence of ACS  - Cards following  - TTE  ED 50-55%, no RWMA, increased LV wall thickness, mild-mod MR, mild TR  - continue BB, ARB    Dyslipidemia - LDL 60 but ; resumed home gemfibrozil    Chronic PE - continue apixaban    ESRD on HD - TTS HD as per Renal    Hyperkalemia - likely due to ESRD; kayexalate prn    Leukopenia, mild, intermittent (POA) - patient is asymptomatic  - Heme/Onc consulted; PBS pending  - likely due to SLE and medications per Heme/Onc     Chronic pain syndrome and chronic back pain - pain control, continue daily prednisone    SLE with exacerbation (POA) - continue home Plaquenil, prednisone, azathioprine  - pain control  - weaning off IV opiate  - no improvement with dexamethasone 6mg IV x1 yesterday    Asthma - controlled, nebs prn    Code status: full  DVT prophylaxis: SCDs    Care Plan discussed with: Patient/Family, Nurse and   Disposition: TBD. Dc home once off fentanyl and BP and pain controlled with po meds     Hospital Problems  Date Reviewed: 9/10/2018          Codes Class Noted POA    Neutropenia ([facility] [address] ICD-10-CM: D70.9  ICD-9-CM: 288.00  9/15/2018 Unknown        Anemia in chronic kidney disease ICD-10-CM: N18.9, D63.1  ICD-9-CM: 285.21  9/15/2018 Unknown        * (Principal)Malignant hypertensive urgency ICD-10-CM: I16.0  ICD-9-CM: 401.0  9/10/2018 Yes            Review of Systems:   Pertinent items are noted in HPI. Vital Signs:    Last 24hrs VS reviewed since prior progress note.  Most recent are:  Visit Vitals    BP (!) 152/106 (BP 1 Location: Left arm, BP Patient Position: At rest;Supine)    Pulse 90    Temp 98.3 °F (36.8 °C)    Resp 14    Ht 5' 5\" (1.651 m)    Wt 63.5 kg (139 lb 15.9 oz)    SpO2 96%    BMI 23.3 kg/m2       No intake or output data in the 24 hours ending 09/17/18 1138     Physical Examination:     Constitutional:  awake, no acute distress, cooperative, pleasant    ENT:  oral mucosa moist, oropharynx benign    Resp:  diminished BS   CV:  regular rhythm, normal rate, no m/r/g appreciated, RUE and b/l LE edema, RUE AVG +thrill/bruit    GI:  +BS, soft, non distended, NT    Musculoskeletal:  HENDRICKSON    Neurologic:  AAOx3, NFD                                             Skin:  warm, dry; multiple healed abdominal surgical scars    Data Review:    Review and/or order of clinical lab test  Review and/or order of tests in the radiology section of CPT  Review and/or order of tests in the medicine section of CPT    Labs:     Recent Labs      09/17/18   0507  09/15/18   0508   WBC  2.2*  2.5*   HGB  10.0*  9.7*   HCT  32.4*  31.9*   PLT  178  154 Recent Labs      09/17/18   0507  09/15/18   0508   NA  134*  137   K  4.1  4.2   CL  98  100   CO2  25  25   BUN  70*  81*   CREA  7.27*  8.02*   GLU  108*  86   CA  8.2*  7.6*   MG  1.9   --    PHOS  4.1   --      Recent Labs      09/17/18   0507   ALB  3.1*     No results for input(s): INR, PTP, APTT in the last 72 hours. No lab exists for component: INREXT, INREXT   No results for input(s): FE, TIBC, PSAT, FERR in the last 72 hours. Lab Results   Component Value Date/Time    Folate 13.7 06/17/2018 03:00 PM      No results for input(s): PH, PCO2, PO2 in the last 72 hours. No results for input(s): CPK, CKNDX, TROIQ in the last 72 hours.     No lab exists for component: CPKMB  Lab Results   Component Value Date/Time    Cholesterol, total 140 09/11/2018 06:17 AM    HDL Cholesterol 43 09/11/2018 06:17 AM    LDL, calculated 60.4 09/11/2018 06:17 AM    Triglyceride 183 (H) 09/11/2018 06:17 AM    CHOL/HDL Ratio 3.3 09/11/2018 06:17 AM     Lab Results   Component Value Date/Time    Glucose (POC) 104 (H) 08/16/2018 11:06 AM    Glucose (POC) 164 (H) 07/22/2018 11:43 AM    Glucose (POC) 138 (H) 07/21/2018 09:22 PM    Glucose (POC) 111 (H) 07/21/2018 04:57 PM    Glucose (POC) 105 (H) 07/21/2018 11:38 AM     Lab Results   Component Value Date/Time    Color YELLOW/STRAW 08/12/2016 09:06 PM    Appearance CLEAR 08/12/2016 09:06 PM    Specific gravity 1.017 08/12/2016 09:06 PM    Specific gravity 1.010 07/11/2016 12:44 PM    pH (UA) 7.0 08/12/2016 09:06 PM    Protein 300 (A) 08/12/2016 09:06 PM    Glucose NEGATIVE  08/12/2016 09:06 PM    Ketone TRACE (A) 08/12/2016 09:06 PM    Bilirubin NEGATIVE  07/11/2016 12:44 PM    Urobilinogen 1.0 08/12/2016 09:06 PM    Nitrites NEGATIVE  08/12/2016 09:06 PM    Leukocyte Esterase NEGATIVE  08/12/2016 09:06 PM    Epithelial cells MODERATE (A) 08/12/2016 09:06 PM    Bacteria 1+ (A) 08/12/2016 09:06 PM    WBC 0-4 08/12/2016 09:06 PM    RBC 0-5 08/12/2016 09:06 PM     Medications Reviewed:     Current Facility-Administered Medications   Medication Dose Route Frequency    minoxidil (LONITEN) tablet 2.5 mg  2.5 mg Oral BID    carvedilol (COREG) tablet 25 mg  25 mg Oral BID WITH MEALS    fentaNYL citrate (PF) injection 12.5 mcg  12.5 mcg IntraVENous Q8H PRN    losartan (COZAAR) tablet 100 mg  100 mg Oral DAILY    diphenhydrAMINE (BENADRYL) injection 12.5 mg  12.5 mg IntraVENous Q6H PRN    HYDROcodone-acetaminophen (NORCO) 7.5-325 mg per tablet 1 Tab  1 Tab Oral Q3H PRN    acetaminophen (TYLENOL) tablet 650 mg  650 mg Oral Q6H PRN    cloNIDine (CATAPRES) 0.3 mg/24 hr patch 1 Patch  1 Patch TransDERmal Q7D    amLODIPine (NORVASC) tablet 10 mg  10 mg Oral DAILY    ferric citrate (AURYXIA) tablet 210 mg  210 mg Oral TID WITH MEALS    amitriptyline (ELAVIL) tablet 25 mg  25 mg Oral QHS    apixaban (ELIQUIS) tablet 5 mg  5 mg Oral BID    azaTHIOprine (IMURAN) tablet 50 mg  50 mg Oral DAILY AFTER BREAKFAST    gemfibrozil (LOPID) tablet 300 mg  300 mg Oral DAILY    hydroxychloroquine (PLAQUENIL) tablet 200 mg  200 mg Oral BID    pantoprazole (PROTONIX) tablet 40 mg  40 mg Oral ACB    polyethylene glycol (MIRALAX) packet 17 g  17 g Oral DAILY PRN    predniSONE (DELTASONE) tablet 10 mg  10 mg Oral DAILY    senna (SENOKOT) tablet 8.6 mg  1 Tab Oral DAILY PRN    sodium chloride (NS) flush 5-10 mL  5-10 mL IntraVENous Q8H    sodium chloride (NS) flush 5-10 mL  5-10 mL IntraVENous PRN    ondansetron (ZOFRAN) injection 4 mg  4 mg IntraVENous Q4H PRN    albuterol-ipratropium (DUO-NEB) 2.5 MG-0.5 MG/3 ML  3 mL Nebulization Q4H PRN    aspirin chewable tablet 81 mg  81 mg Oral DAILY    niCARdipine (CARDENE) 25 mg in 0.9% sodium chloride 250 mL infusion  0-15 mg/hr IntraVENous TITRATE     ______________________________________________________________________  EXPECTED LENGTH OF STAY: 2d 2h  ACTUAL LENGTH OF STAY:          7                 Claire Montenegro MD

## 2018-09-17 NOTE — PROGRESS NOTES
NAME: Yasmeen Hector        :  1985        MRN:  [id number]        Assessment :    Plan:  --[facility]-[place]  Anemia of ESRD  HTN- uncontrolled. SLE  SHPT  Leukopenia   Next HD tomorrow    Ongoing med adjustments  Coreg at 25 mg BID  Amlodipine 10mg daily  CatapresTTS-3 patch   Losartan 100mg daily    Increase Minoxidil to 2.5mg BID    Ct efforts to bring down EDW on HD    Leukopenia-> likely 2 to SLE meds per heme    Am labs           Subjective:     Chief Complaint: Mild nausea this morning. BP remains labile. No CPl    Review of Systems:    Symptom Y/N Comments  Symptom Y/N Comments   Fever/Chills    Chest Pain n    Poor Appetite    Edema n    Cough    Abdominal Pain     Sputum    Joint Pain     SOB/IVY n   Pruritis/Rash     Nausea/vomit Y/N   Tolerating PT/OT     Diarrhea    Tolerating Diet     Constipation    Other       Could not obtain due to:      Objective:     VITALS:   Last 24hrs VS reviewed since prior progress note.  Most recent are:  Visit Vitals    BP (!) 211/100    Pulse 93    Temp 98 °F (36.7 °C)    Resp 19    Ht 5' 5\" (1.651 m)    Wt 63.5 kg (139 lb 15.9 oz)    SpO2 100%    BMI 23.3 kg/m2     No intake or output data in the 24 hours ending 18 0809   Telemetry Reviewed:     PHYSICAL EXAM:  General: NAD  Clear  RRR  No edema      Lab Data Reviewed: (see below)    Medications Reviewed: (see below)    PMH/SH reviewed - no change compared to H&P  ________________________________________________________________________  Care Plan discussed with:  Patient y    [facility]:          Comments   >50% of visit spent in counseling and coordination of care       ________________________________________________________________________  David Tadeo MD     Procedures: see electronic medical records for all procedures/Xrays and details which  were not copied into this note but were reviewed prior to creation of Plan. LABS:  Recent Labs      09/17/18   0507  09/15/18   0508   WBC  2.2*  2.5*   HGB  10.0*  9.7*   HCT  32.4*  31.9*   PLT  178  154     Recent Labs      09/17/18   0507  09/15/18   0508   NA  134*  137   K  4.1  4.2   CL  98  100   CO2  25  25   BUN  70*  81*   CREA  7.27*  8.02*   GLU  108*  86   CA  8.2*  7.6*   MG  1.9   --    PHOS  4.1   --      Recent Labs      09/17/18   0507   ALB  3.1*     No results for input(s): INR, PTP, APTT in the last 72 hours. No lab exists for component: INREXT, INREXT   No results for input(s): FE, TIBC, PSAT, FERR in the last 72 hours.    Lab Results   Component Value Date/Time    Folate 13.7 06/17/2018 03:00 PM      MEDICATIONS:  Current Facility-Administered Medications   Medication Dose Route Frequency    minoxidil (LONITEN) tablet 2.5 mg  2.5 mg Oral BID    carvedilol (COREG) tablet 25 mg  25 mg Oral BID WITH MEALS    fentaNYL citrate (PF) injection 12.5 mcg  12.5 mcg IntraVENous Q8H PRN    losartan (COZAAR) tablet 100 mg  100 mg Oral DAILY    diphenhydrAMINE (BENADRYL) injection 12.5 mg  12.5 mg IntraVENous Q6H PRN    HYDROcodone-acetaminophen (NORCO) 7.5-325 mg per tablet 1 Tab  1 Tab Oral Q3H PRN    acetaminophen (TYLENOL) tablet 650 mg  650 mg Oral Q6H PRN    cloNIDine (CATAPRES) 0.3 mg/24 hr patch 1 Patch  1 Patch TransDERmal Q7D    amLODIPine (NORVASC) tablet 10 mg  10 mg Oral DAILY    ferric citrate (AURYXIA) tablet 210 mg  210 mg Oral TID WITH MEALS    amitriptyline (ELAVIL) tablet 25 mg  25 mg Oral QHS    apixaban (ELIQUIS) tablet 5 mg  5 mg Oral BID    azaTHIOprine (IMURAN) tablet 50 mg  50 mg Oral DAILY AFTER BREAKFAST    gemfibrozil (LOPID) tablet 300 mg  300 mg Oral DAILY    hydroxychloroquine (PLAQUENIL) tablet 200 mg  200 mg Oral BID    pantoprazole (PROTONIX) tablet 40 mg  40 mg Oral ACB    polyethylene glycol (MIRALAX) packet 17 g  17 g Oral DAILY PRN    predniSONE (DELTASONE) tablet 10 mg  10 mg Oral DAILY    senna (SENOKOT) tablet 8.6 mg  1 Tab Oral DAILY PRN    sodium chloride (NS) flush 5-10 mL  5-10 mL IntraVENous Q8H    sodium chloride (NS) flush 5-10 mL  5-10 mL IntraVENous PRN    ondansetron (ZOFRAN) injection 4 mg  4 mg IntraVENous Q4H PRN    albuterol-ipratropium (DUO-NEB) 2.5 MG-0.5 MG/3 ML  3 mL Nebulization Q4H PRN    aspirin chewable tablet 81 mg  81 mg Oral DAILY    niCARdipine (CARDENE) 25 mg in 0.9% sodium chloride 250 mL infusion  0-15 mg/hr IntraVENous TITRATE

## 2018-09-17 NOTE — PROGRESS NOTES
Problem: Falls - Risk of  Goal: *Absence of Falls  Document Alex Fall Risk and appropriate interventions in the flowsheet. Outcome: Progressing Towards Goal  Fall Risk Interventions:            Medication Interventions: Teach patient to arise slowly, Patient to call before getting OOB    Elimination Interventions:  Toilet paper/wipes in reach, Patient to call for help with toileting needs, Toileting schedule/hourly rounds

## 2018-09-18 LAB
ALBUMIN SERPL-MCNC: 2.8 G/DL (ref 3.5–5)
ALBUMIN/GLOB SERPL: 0.7 {RATIO} (ref 1.1–2.2)
ALP SERPL-CCNC: 62 U/L (ref 45–117)
ALT SERPL-CCNC: 24 U/L (ref 12–78)
ANION GAP SERPL CALC-SCNC: 15 MMOL/L (ref 5–15)
AST SERPL-CCNC: 20 U/L (ref 15–37)
BILIRUB SERPL-MCNC: 0.3 MG/DL (ref 0.2–1)
BUN SERPL-MCNC: 82 MG/DL (ref 6–20)
BUN/CREAT SERPL: 9 (ref 12–20)
CALCIUM SERPL-MCNC: 8.7 MG/DL (ref 8.5–10.1)
CHLORIDE SERPL-SCNC: 97 MMOL/L (ref 97–108)
CO2 SERPL-SCNC: 22 MMOL/L (ref 21–32)
CREAT SERPL-MCNC: 8.72 MG/DL (ref 0.55–1.02)
ERYTHROCYTE [DISTWIDTH] IN BLOOD BY AUTOMATED COUNT: 14.7 % (ref 11.5–14.5)
GLOBULIN SER CALC-MCNC: 3.9 G/DL (ref 2–4)
GLUCOSE SERPL-MCNC: 103 MG/DL (ref 65–100)
HCT VFR BLD AUTO: 30.9 % (ref 35–47)
HGB BLD-MCNC: 9.4 G/DL (ref 11.5–16)
MAGNESIUM SERPL-MCNC: 1.9 MG/DL (ref 1.6–2.4)
MCH RBC QN AUTO: 28.2 PG (ref 26–34)
MCHC RBC AUTO-ENTMCNC: 30.4 G/DL (ref 30–36.5)
MCV RBC AUTO: 92.8 FL (ref 80–99)
NRBC # BLD: 0 K/UL (ref 0–0.01)
NRBC BLD-RTO: 0 PER 100 WBC
PHOSPHATE SERPL-MCNC: 4.6 MG/DL (ref 2.6–4.7)
PLATELET # BLD AUTO: 152 K/UL (ref 150–400)
PMV BLD AUTO: 11.3 FL (ref 8.9–12.9)
POTASSIUM SERPL-SCNC: 4.4 MMOL/L (ref 3.5–5.1)
PROT SERPL-MCNC: 6.7 G/DL (ref 6.4–8.2)
RBC # BLD AUTO: 3.33 M/UL (ref 3.8–5.2)
SODIUM SERPL-SCNC: 134 MMOL/L (ref 136–145)
WBC # BLD AUTO: 2.1 K/UL (ref 3.6–11)

## 2018-09-18 PROCEDURE — 74011250637 HC RX REV CODE- 250/637: Performed by: INTERNAL MEDICINE

## 2018-09-18 PROCEDURE — 83735 ASSAY OF MAGNESIUM: CPT | Performed by: INTERNAL MEDICINE

## 2018-09-18 PROCEDURE — 94760 N-INVAS EAR/PLS OXIMETRY 1: CPT

## 2018-09-18 PROCEDURE — 80053 COMPREHEN METABOLIC PANEL: CPT | Performed by: INTERNAL MEDICINE

## 2018-09-18 PROCEDURE — 65660000000 HC RM CCU STEPDOWN

## 2018-09-18 PROCEDURE — 84100 ASSAY OF PHOSPHORUS: CPT | Performed by: INTERNAL MEDICINE

## 2018-09-18 PROCEDURE — 77010033678 HC OXYGEN DAILY

## 2018-09-18 PROCEDURE — 36415 COLL VENOUS BLD VENIPUNCTURE: CPT | Performed by: INTERNAL MEDICINE

## 2018-09-18 PROCEDURE — 74011250636 HC RX REV CODE- 250/636: Performed by: FAMILY MEDICINE

## 2018-09-18 PROCEDURE — 85027 COMPLETE CBC AUTOMATED: CPT | Performed by: INTERNAL MEDICINE

## 2018-09-18 PROCEDURE — 74011250636 HC RX REV CODE- 250/636: Performed by: INTERNAL MEDICINE

## 2018-09-18 PROCEDURE — 74011636637 HC RX REV CODE- 636/637: Performed by: INTERNAL MEDICINE

## 2018-09-18 RX ADMIN — HYDROCODONE BITARTRATE AND ACETAMINOPHEN 1 TABLET: 7.5; 325 TABLET ORAL at 23:01

## 2018-09-18 RX ADMIN — Medication 10 ML: at 06:52

## 2018-09-18 RX ADMIN — PREDNISONE 10 MG: 5 TABLET ORAL at 08:59

## 2018-09-18 RX ADMIN — DIPHENHYDRAMINE HYDROCHLORIDE 12.5 MG: 50 INJECTION, SOLUTION INTRAMUSCULAR; INTRAVENOUS at 16:34

## 2018-09-18 RX ADMIN — DIPHENHYDRAMINE HYDROCHLORIDE 12.5 MG: 50 INJECTION, SOLUTION INTRAMUSCULAR; INTRAVENOUS at 09:04

## 2018-09-18 RX ADMIN — LOSARTAN POTASSIUM 100 MG: 50 TABLET ORAL at 08:59

## 2018-09-18 RX ADMIN — FERRIC CITRATE 210 MG: 210 TABLET, COATED ORAL at 08:59

## 2018-09-18 RX ADMIN — AMITRIPTYLINE HYDROCHLORIDE 25 MG: 50 TABLET, FILM COATED ORAL at 23:02

## 2018-09-18 RX ADMIN — HYDROCODONE BITARTRATE AND ACETAMINOPHEN 1 TABLET: 7.5; 325 TABLET ORAL at 16:34

## 2018-09-18 RX ADMIN — DIPHENHYDRAMINE HYDROCHLORIDE 12.5 MG: 50 INJECTION, SOLUTION INTRAMUSCULAR; INTRAVENOUS at 00:55

## 2018-09-18 RX ADMIN — Medication 10 ML: at 16:34

## 2018-09-18 RX ADMIN — HYDROXYCHLOROQUINE SULFATE 200 MG: 200 TABLET, FILM COATED ORAL at 08:59

## 2018-09-18 RX ADMIN — FERRIC CITRATE 210 MG: 210 TABLET, COATED ORAL at 13:00

## 2018-09-18 RX ADMIN — ASPIRIN 81 MG CHEWABLE TABLET 81 MG: 81 TABLET CHEWABLE at 08:58

## 2018-09-18 RX ADMIN — FERRIC CITRATE 210 MG: 210 TABLET, COATED ORAL at 18:23

## 2018-09-18 RX ADMIN — GEMFIBROZIL 300 MG: 600 TABLET ORAL at 08:59

## 2018-09-18 RX ADMIN — AMLODIPINE BESYLATE 10 MG: 5 TABLET ORAL at 08:58

## 2018-09-18 RX ADMIN — HYDROCODONE BITARTRATE AND ACETAMINOPHEN 1 TABLET: 7.5; 325 TABLET ORAL at 13:07

## 2018-09-18 RX ADMIN — HYDROXYCHLOROQUINE SULFATE 200 MG: 200 TABLET, FILM COATED ORAL at 18:17

## 2018-09-18 RX ADMIN — FENTANYL CITRATE 12.5 MCG: 50 INJECTION, SOLUTION INTRAMUSCULAR; INTRAVENOUS at 08:56

## 2018-09-18 RX ADMIN — FENTANYL CITRATE 12.5 MCG: 50 INJECTION, SOLUTION INTRAMUSCULAR; INTRAVENOUS at 01:01

## 2018-09-18 RX ADMIN — CLONIDINE HYDROCHLORIDE 0.2 MG: 0.2 TABLET ORAL at 08:58

## 2018-09-18 RX ADMIN — Medication 10 ML: at 23:03

## 2018-09-18 RX ADMIN — MINOXIDIL 2.5 MG: 2.5 TABLET ORAL at 08:59

## 2018-09-18 RX ADMIN — PANTOPRAZOLE SODIUM 40 MG: 40 TABLET, DELAYED RELEASE ORAL at 06:52

## 2018-09-18 RX ADMIN — APIXABAN 5 MG: 5 TABLET, FILM COATED ORAL at 08:59

## 2018-09-18 RX ADMIN — APIXABAN 5 MG: 5 TABLET, FILM COATED ORAL at 18:17

## 2018-09-18 RX ADMIN — AZATHIOPRINE 50 MG: 50 TABLET ORAL at 09:00

## 2018-09-18 RX ADMIN — CARVEDILOL 25 MG: 12.5 TABLET, FILM COATED ORAL at 08:58

## 2018-09-18 NOTE — PROGRESS NOTES
Problem: Hypertension  Goal: *Blood pressure within specified parameters  Outcome: Progressing Towards Goal  Patient Vitals for the past 12 hrs:   Temp Pulse Resp BP SpO2   09/18/18 0309 98 °F (36.7 °C) 75 20 104/57 98 %   09/17/18 2329 98 °F (36.7 °C) 79 20 128/77 98 %   09/17/18 1951 97.9 °F (36.6 °C) 81 23 124/83 100 %

## 2018-09-18 NOTE — PROGRESS NOTES
NAME: Matthew Vargas        :  1985        MRN:  [id number]        Assessment :    Plan:  --[facility]-[place]  Anemia of ESRD  HTN- uncontrolled. SLE  SHPT  Leukopenia   Next HD today-> can consider discharge post HD    Ongoing med adjustments  Coreg at 25 mg BID  Amlodipine 10mg daily  CatapresTTS-3 patch-> clonidine 0.2mg BID   Losartan 100mg daily    Increased Minoxidil to 2.5mg BID yesterday    Ct efforts to bring down EDW on HD    Leukopenia-> likely 2 to SLE meds per heme    Am labs (if still here)           Subjective:     Chief Complaint: No complaints today. BP improved. Feels better. No Lh/Dz. Review of Systems:    Symptom Y/N Comments  Symptom Y/N Comments   Fever/Chills    Chest Pain n    Poor Appetite    Edema n    Cough    Abdominal Pain     Sputum    Joint Pain     SOB/IVY n   Pruritis/Rash     Nausea/vomit N   Tolerating PT/OT     Diarrhea    Tolerating Diet     Constipation    Other       Could not obtain due to:      Objective:     VITALS:   Last 24hrs VS reviewed since prior progress note.  Most recent are:  Visit Vitals    /52 (BP 1 Location: Left arm, BP Patient Position: At rest;Supine)    Pulse 77    Temp 98 °F (36.7 °C)    Resp 14    Ht 5' 5\" (1.651 m)    Wt 67.9 kg (149 lb 11.1 oz)    SpO2 97%    BMI 24.91 kg/m2     No intake or output data in the 24 hours ending 18 1323   Telemetry Reviewed:     PHYSICAL EXAM:  General: NAD  Clear  RRR  No edema      Lab Data Reviewed: (see below)    Medications Reviewed: (see below)    PMH/SH reviewed - no change compared to H&P  ________________________________________________________________________  Care Plan discussed with:  Patient y    [facility]:          Comments   >50% of visit spent in counseling and coordination of care ________________________________________________________________________  Fly Rincon MD     Procedures: see electronic medical records for all procedures/Xrays and details which  were not copied into this note but were reviewed prior to creation of Plan. LABS:  Recent Labs      09/18/18   0403  09/17/18   0507   WBC  2.1*  2.2*   HGB  9.4*  10.0*   HCT  30.9*  32.4*   PLT  152  178     Recent Labs      09/18/18   0403  09/17/18   0507   NA  134*  134*   K  4.4  4.1   CL  97  98   CO2  22  25   BUN  82*  70*   CREA  8.72*  7.27*   GLU  103*  108*   CA  8.7  8.2*   MG  1.9  1.9   PHOS  4.6  4.1     Recent Labs      09/18/18   0403  09/17/18   0507   SGOT  20   --    AP  62   --    TP  6.7   --    ALB  2.8*  3.1*   GLOB  3.9   --      No results for input(s): INR, PTP, APTT in the last 72 hours. No lab exists for component: INREXT, INREXT   No results for input(s): FE, TIBC, PSAT, FERR in the last 72 hours.    Lab Results   Component Value Date/Time    Folate 13.7 06/17/2018 03:00 PM      MEDICATIONS:  Current Facility-Administered Medications   Medication Dose Route Frequency    minoxidil (LONITEN) tablet 2.5 mg  2.5 mg Oral BID    cloNIDine HCl (CATAPRES) tablet 0.2 mg  0.2 mg Oral BID    carvedilol (COREG) tablet 25 mg  25 mg Oral BID WITH MEALS    losartan (COZAAR) tablet 100 mg  100 mg Oral DAILY    diphenhydrAMINE (BENADRYL) injection 12.5 mg  12.5 mg IntraVENous Q6H PRN    HYDROcodone-acetaminophen (NORCO) 7.5-325 mg per tablet 1 Tab  1 Tab Oral Q3H PRN    acetaminophen (TYLENOL) tablet 650 mg  650 mg Oral Q6H PRN    amLODIPine (NORVASC) tablet 10 mg  10 mg Oral DAILY    ferric citrate (AURYXIA) tablet 210 mg  210 mg Oral TID WITH MEALS    amitriptyline (ELAVIL) tablet 25 mg  25 mg Oral QHS    apixaban (ELIQUIS) tablet 5 mg  5 mg Oral BID    azaTHIOprine (IMURAN) tablet 50 mg  50 mg Oral DAILY AFTER BREAKFAST    gemfibrozil (LOPID) tablet 300 mg  300 mg Oral DAILY    hydroxychloroquine (PLAQUENIL) tablet 200 mg  200 mg Oral BID    pantoprazole (PROTONIX) tablet 40 mg  40 mg Oral ACB    polyethylene glycol (MIRALAX) packet 17 g  17 g Oral DAILY PRN    predniSONE (DELTASONE) tablet 10 mg  10 mg Oral DAILY    senna (SENOKOT) tablet 8.6 mg  1 Tab Oral DAILY PRN    sodium chloride (NS) flush 5-10 mL  5-10 mL IntraVENous Q8H    sodium chloride (NS) flush 5-10 mL  5-10 mL IntraVENous PRN    ondansetron (ZOFRAN) injection 4 mg  4 mg IntraVENous Q4H PRN    albuterol-ipratropium (DUO-NEB) 2.5 MG-0.5 MG/3 ML  3 mL Nebulization Q4H PRN    aspirin chewable tablet 81 mg  81 mg Oral DAILY    niCARdipine (CARDENE) 25 mg in 0.9% sodium chloride 250 mL infusion  0-15 mg/hr IntraVENous TITRATE

## 2018-09-18 NOTE — PROGRESS NOTES
NUTRITION- DIETETIC tECHnICIAN    Pt seen for:       [x]                  Rescreen  []                  Food preferences/tolerances  []                  Food Allergies  []                  PO intake check  []                  Supplements  []                  Diet order clarification  []                  Education  []                  Other     Rescreen:    [x]                  Not at Nutrition Risk, rescreen per screening protocol  []                  At Nutrition Risk- RD referral         SUBJECTIVE/OBJECTIVE:     Information obtained from:  patient      Diet:  Regular 2 gm Na    Intake: good    Patient Vitals for the past 100 hrs:   % Diet Eaten   09/15/18 1845 75 %   09/15/18 1514 75 %   09/15/18 0919 75 %   09/14/18 1300 100 %       Weight Changes:       Wt Readings from Last 5 Encounters:   09/18/18 67.9 kg (149 lb 11.1 oz)   08/27/18 64.9 kg (143 lb)   08/20/18 58.1 kg (128 lb)   07/31/18 60.3 kg (133 lb)   07/30/18 67.1 kg (148 lb)   ]    Problems Identified:      [x]                  Eating well   []                  Specified food preferences   []                  Dislikes supplements              []                  Allergies:   []                  Difficulty chewing      []                  Dentition    []                  Nausea/Vomiting   []                  Constipation   []                  Diarrhea    PLAN:     [x]                   Continue current diet and encourage intake  []                   Obtained/adjusted food preferences/tolerances and/or snacks options   []                   Dislikes supplements will try a substitution  []                   Modify diet for food allergies  []                   Adjust texture due to difficulty chewing   []                   Educated patient  []                   RD Referral  [x]                   Rescreen per screening protocol          Ishmael Severin, DTR

## 2018-09-18 NOTE — PROGRESS NOTES
Hospitalist Progress Note  Radha Weaver MD  Answering service: [phone] OR [phone] from in house phone      Date of Service:  2018  NAME:  Viet Corbin  :  1985  MRN:  [id number]      Admission Summary:   30 yo woman with dyslipidemia, h/o pulmonary embolism, ESRD on TTS HD, chronic pain syndrome, asthma, systemic lupus erythematosus presented to the ED from home on 9/10/18 with progressive fatigue, generalized body aches, abdominal pain, chest pain. She was admitted to [facility] with hypertensive urgency.      Interval history / Subjective:   BP better controlled scheduled for HD      Assessment & Plan:     Hypertensive urgency (POA)   - has been on and off nicardipine gtt and home meds being adjusted to try and wean off nicardipine gtt  - CT head wo contrast 9/10 unremarkable  - pt reports very difficult to control BP despite medication adherence; will need adjustments to her home medication at the time of discharge  - nursing staff, pt, and HD nurse advised not to hold BP meds prior to HD  - started minoxidil and titrating up    Elevated troponin with CP (POA)   - nonspecific with ESRD and ECG without evidence of ACS  - Cards following  - TTE  ED 50-55%, no RWMA, increased LV wall thickness, mild-mod MR, mild TR  - continue BB, ARB    Dyslipidemia - LDL 60 but ; resumed home gemfibrozil    Chronic PE - continue apixaban    ESRD on HD - TTS HD as per Renal    Hyperkalemia - likely due to ESRD; kayexalate prn    Leukopenia, mild, intermittent (POA) - patient is asymptomatic  - Heme/Onc consulted;PRN f/u   - likely due to SLE and medications per Heme/Onc     Chronic pain syndrome and chronic back pain - pain control, continue daily prednisone    SLE with exacerbation (POA) - continue home Plaquenil, prednisone, azathioprine  - pain control  - meds to oral today in anticipation to d/c  - no improvement with dexamethasone 6mg IV x1 yesterday    Asthma - controlled, nebs prn    Code status: full  DVT prophylaxis: SCDs    Care Plan discussed with: Patient/Family, Nurse and   Disposition: TBD. Dc home once off fentanyl and BP and pain controlled with po meds     Hospital Problems  Date Reviewed: 9/10/2018          Codes Class Noted POA    Neutropenia ([facility] [address] ICD-10-CM: D70.9  ICD-9-CM: 288.00  9/15/2018 Unknown        Anemia in chronic kidney disease ICD-10-CM: N18.9, D63.1  ICD-9-CM: 285.21  9/15/2018 Unknown        * (Principal)Malignant hypertensive urgency ICD-10-CM: I16.0  ICD-9-CM: 401.0  9/10/2018 Yes            Review of Systems:   Pertinent items are noted in HPI. Vital Signs:    Last 24hrs VS reviewed since prior progress note. Most recent are:  Visit Vitals    BP (!) 148/93    Pulse 81    Temp 98 °F (36.7 °C)    Resp 20    Ht 5' 5\" (1.651 m)    Wt 67.9 kg (149 lb 11.1 oz)    SpO2 98%    BMI 24.91 kg/m2       No intake or output data in the 24 hours ending 09/18/18 0951     Physical Examination:     Constitutional:  awake, no acute distress, cooperative, pleasant    ENT:  oral mucosa moist, oropharynx benign    Resp:  diminished BS   CV:  regular rhythm, normal rate, no m/r/g appreciated, RUE and b/l LE edema, RUE AVG +thrill/bruit    GI:  +BS, soft, non distended, NT    Musculoskeletal:  HENDRICKSON    Neurologic:  AAOx3, NFD                                             Skin:  warm, dry; multiple healed abdominal surgical scars    Data Review:    Review and/or order of clinical lab test  Review and/or order of tests in the radiology section of CPT  Review and/or order of tests in the medicine section of CPT  Ct Head Wo Cont    Result Date: 9/10/2018  IMPRESSION: No acute process.      Labs:     Recent Labs      09/18/18   0403  09/17/18   0507   WBC  2.1*  2.2*   HGB  9.4*  10.0*   HCT  30.9*  32.4*   PLT  152  178     Recent Labs      09/18/18   0403  09/17/18   0507   NA  134*  134*   K  4.4  4.1   CL  97  98   CO2  22 25   BUN  82*  70*   CREA  8.72*  7.27*   GLU  103*  108*   CA  8.7  8.2*   MG  1.9  1.9   PHOS  4.6  4.1     Recent Labs      09/18/18   0403  09/17/18   0507   SGOT  20   --    ALT  24   --    AP  62   --    TBILI  0.3   --    TP  6.7   --    ALB  2.8*  3.1*   GLOB  3.9   --      No results for input(s): INR, PTP, APTT in the last 72 hours. No lab exists for component: INREXT, INREXT   No results for input(s): FE, TIBC, PSAT, FERR in the last 72 hours. Lab Results   Component Value Date/Time    Folate 13.7 06/17/2018 03:00 PM      No results for input(s): PH, PCO2, PO2 in the last 72 hours. No results for input(s): CPK, CKNDX, TROIQ in the last 72 hours.     No lab exists for component: CPKMB  Lab Results   Component Value Date/Time    Cholesterol, total 140 09/11/2018 06:17 AM    HDL Cholesterol 43 09/11/2018 06:17 AM    LDL, calculated 60.4 09/11/2018 06:17 AM    Triglyceride 183 (H) 09/11/2018 06:17 AM    CHOL/HDL Ratio 3.3 09/11/2018 06:17 AM     Lab Results   Component Value Date/Time    Glucose (POC) 104 (H) 08/16/2018 11:06 AM    Glucose (POC) 164 (H) 07/22/2018 11:43 AM    Glucose (POC) 138 (H) 07/21/2018 09:22 PM    Glucose (POC) 111 (H) 07/21/2018 04:57 PM    Glucose (POC) 105 (H) 07/21/2018 11:38 AM     Lab Results   Component Value Date/Time    Color YELLOW/STRAW 08/12/2016 09:06 PM    Appearance CLEAR 08/12/2016 09:06 PM    Specific gravity 1.017 08/12/2016 09:06 PM    Specific gravity 1.010 07/11/2016 12:44 PM    pH (UA) 7.0 08/12/2016 09:06 PM    Protein 300 (A) 08/12/2016 09:06 PM    Glucose NEGATIVE  08/12/2016 09:06 PM    Ketone TRACE (A) 08/12/2016 09:06 PM    Bilirubin NEGATIVE  07/11/2016 12:44 PM    Urobilinogen 1.0 08/12/2016 09:06 PM    Nitrites NEGATIVE  08/12/2016 09:06 PM    Leukocyte Esterase NEGATIVE  08/12/2016 09:06 PM    Epithelial cells MODERATE (A) 08/12/2016 09:06 PM    Bacteria 1+ (A) 08/12/2016 09:06 PM    WBC 0-4 08/12/2016 09:06 PM    RBC 0-5 08/12/2016 09:06 PM Medications Reviewed:     Current Facility-Administered Medications   Medication Dose Route Frequency    minoxidil (LONITEN) tablet 2.5 mg  2.5 mg Oral BID    cloNIDine HCl (CATAPRES) tablet 0.2 mg  0.2 mg Oral BID    carvedilol (COREG) tablet 25 mg  25 mg Oral BID WITH MEALS    fentaNYL citrate (PF) injection 12.5 mcg  12.5 mcg IntraVENous Q8H PRN    losartan (COZAAR) tablet 100 mg  100 mg Oral DAILY    diphenhydrAMINE (BENADRYL) injection 12.5 mg  12.5 mg IntraVENous Q6H PRN    HYDROcodone-acetaminophen (NORCO) 7.5-325 mg per tablet 1 Tab  1 Tab Oral Q3H PRN    acetaminophen (TYLENOL) tablet 650 mg  650 mg Oral Q6H PRN    cloNIDine (CATAPRES) 0.3 mg/24 hr patch 1 Patch  1 Patch TransDERmal Q7D    amLODIPine (NORVASC) tablet 10 mg  10 mg Oral DAILY    ferric citrate (AURYXIA) tablet 210 mg  210 mg Oral TID WITH MEALS    amitriptyline (ELAVIL) tablet 25 mg  25 mg Oral QHS    apixaban (ELIQUIS) tablet 5 mg  5 mg Oral BID    azaTHIOprine (IMURAN) tablet 50 mg  50 mg Oral DAILY AFTER BREAKFAST    gemfibrozil (LOPID) tablet 300 mg  300 mg Oral DAILY    hydroxychloroquine (PLAQUENIL) tablet 200 mg  200 mg Oral BID    pantoprazole (PROTONIX) tablet 40 mg  40 mg Oral ACB    polyethylene glycol (MIRALAX) packet 17 g  17 g Oral DAILY PRN    predniSONE (DELTASONE) tablet 10 mg  10 mg Oral DAILY    senna (SENOKOT) tablet 8.6 mg  1 Tab Oral DAILY PRN    sodium chloride (NS) flush 5-10 mL  5-10 mL IntraVENous Q8H    sodium chloride (NS) flush 5-10 mL  5-10 mL IntraVENous PRN    ondansetron (ZOFRAN) injection 4 mg  4 mg IntraVENous Q4H PRN    albuterol-ipratropium (DUO-NEB) 2.5 MG-0.5 MG/3 ML  3 mL Nebulization Q4H PRN    aspirin chewable tablet 81 mg  81 mg Oral DAILY    niCARdipine (CARDENE) 25 mg in 0.9% sodium chloride 250 mL infusion  0-15 mg/hr IntraVENous TITRATE     ______________________________________________________________________  EXPECTED LENGTH OF STAY: 2d 2h  ACTUAL LENGTH OF STAY: 100 [place], [place]

## 2018-09-19 VITALS
TEMPERATURE: 97.8 F | SYSTOLIC BLOOD PRESSURE: 116 MMHG | RESPIRATION RATE: 24 BRPM | HEART RATE: 92 BPM | DIASTOLIC BLOOD PRESSURE: 75 MMHG | WEIGHT: 139.99 LBS | OXYGEN SATURATION: 96 % | HEIGHT: 65 IN | BODY MASS INDEX: 23.32 KG/M2

## 2018-09-19 PROBLEM — D70.9 NEUTROPENIA (HCC): Status: RESOLVED | Noted: 2018-09-15 | Resolved: 2018-09-19

## 2018-09-19 PROBLEM — I16.0 MALIGNANT HYPERTENSIVE URGENCY: Status: RESOLVED | Noted: 2018-09-10 | Resolved: 2018-09-19

## 2018-09-19 PROBLEM — N18.9 ANEMIA IN CHRONIC KIDNEY DISEASE: Status: RESOLVED | Noted: 2018-09-15 | Resolved: 2018-09-19

## 2018-09-19 PROBLEM — D63.1 ANEMIA IN CHRONIC KIDNEY DISEASE: Status: RESOLVED | Noted: 2018-09-15 | Resolved: 2018-09-19

## 2018-09-19 PROCEDURE — 74011250637 HC RX REV CODE- 250/637: Performed by: INTERNAL MEDICINE

## 2018-09-19 PROCEDURE — 74011250636 HC RX REV CODE- 250/636: Performed by: INTERNAL MEDICINE

## 2018-09-19 PROCEDURE — 74011636637 HC RX REV CODE- 636/637: Performed by: INTERNAL MEDICINE

## 2018-09-19 RX ORDER — MINOXIDIL 2.5 MG/1
2.5 TABLET ORAL 2 TIMES DAILY
Qty: 60 TAB | Refills: 0 | Status: SHIPPED | OUTPATIENT
Start: 2018-09-19 | End: 2018-10-19

## 2018-09-19 RX ORDER — LOSARTAN POTASSIUM 100 MG/1
100 TABLET ORAL DAILY
Qty: 30 TAB | Refills: 0 | Status: SHIPPED | OUTPATIENT
Start: 2018-09-19 | End: 2018-10-19

## 2018-09-19 RX ORDER — CLONIDINE HYDROCHLORIDE 0.2 MG/1
0.2 TABLET ORAL 2 TIMES DAILY
Qty: 60 TAB | Refills: 0 | Status: SHIPPED | OUTPATIENT
Start: 2018-09-19 | End: 2018-10-19

## 2018-09-19 RX ORDER — OXYCODONE AND ACETAMINOPHEN 5; 325 MG/1; MG/1
TABLET ORAL
Qty: 10 TAB | Refills: 0 | Status: SHIPPED | OUTPATIENT
Start: 2018-09-19 | End: 2018-09-26

## 2018-09-19 RX ADMIN — ASPIRIN 81 MG CHEWABLE TABLET 81 MG: 81 TABLET CHEWABLE at 10:03

## 2018-09-19 RX ADMIN — HYDROCODONE BITARTRATE AND ACETAMINOPHEN 1 TABLET: 7.5; 325 TABLET ORAL at 02:33

## 2018-09-19 RX ADMIN — APIXABAN 5 MG: 5 TABLET, FILM COATED ORAL at 10:03

## 2018-09-19 RX ADMIN — HYDROXYCHLOROQUINE SULFATE 200 MG: 200 TABLET, FILM COATED ORAL at 10:03

## 2018-09-19 RX ADMIN — CARVEDILOL 25 MG: 12.5 TABLET, FILM COATED ORAL at 10:03

## 2018-09-19 RX ADMIN — AMLODIPINE BESYLATE 10 MG: 5 TABLET ORAL at 10:02

## 2018-09-19 RX ADMIN — Medication 10 ML: at 06:34

## 2018-09-19 RX ADMIN — AZATHIOPRINE 50 MG: 50 TABLET ORAL at 10:12

## 2018-09-19 RX ADMIN — PREDNISONE 10 MG: 5 TABLET ORAL at 10:03

## 2018-09-19 RX ADMIN — CLONIDINE HYDROCHLORIDE 0.2 MG: 0.2 TABLET ORAL at 10:03

## 2018-09-19 RX ADMIN — PANTOPRAZOLE SODIUM 40 MG: 40 TABLET, DELAYED RELEASE ORAL at 06:35

## 2018-09-19 RX ADMIN — MINOXIDIL 2.5 MG: 2.5 TABLET ORAL at 10:03

## 2018-09-19 RX ADMIN — LOSARTAN POTASSIUM 100 MG: 50 TABLET ORAL at 10:03

## 2018-09-19 RX ADMIN — HYDROCODONE BITARTRATE AND ACETAMINOPHEN 1 TABLET: 7.5; 325 TABLET ORAL at 10:12

## 2018-09-19 RX ADMIN — FERRIC CITRATE 210 MG: 210 TABLET, COATED ORAL at 10:13

## 2018-09-19 RX ADMIN — HYDROCODONE BITARTRATE AND ACETAMINOPHEN 1 TABLET: 7.5; 325 TABLET ORAL at 06:39

## 2018-09-19 RX ADMIN — GEMFIBROZIL 300 MG: 600 TABLET ORAL at 10:02

## 2018-09-19 NOTE — DISCHARGE INSTRUCTIONS
Discharge Instructions       PATIENT ID: Danielle Taylor  MRN: [id number]   YOB: 1985    DATE OF ADMISSION: 9/10/2018  5:41 PM    DATE OF DISCHARGE: 9/19/2018    PRIMARY CARE PROVIDER: Governor Yogesh NP     ATTENDING PHYSICIAN: Garrison Peres MD  DISCHARGING PROVIDER: Garrison Peres MD    To contact this individual call [phone] and ask the  to page. If unavailable ask to be transferred the Adult Hospitalist Department. DISCHARGE DIAGNOSES ESRD with HTN urgency    CONSULTATIONS: IP CONSULT TO CARDIOLOGY  IP CONSULT TO NEPHROLOGY  IP CONSULT TO HEMATOLOGY    PROCEDURES/SURGERIES: * No surgery found *    PENDING TEST RESULTS:   At the time of discharge the following test results are still pending:     FOLLOW UP APPOINTMENTS:   Follow-up Information     Follow up With Details Comments Contact Info    Governor Yogesh NP In 1 week  [address] [phone]             ADDITIONAL CARE RECOMMENDATIONS:     DIET: Renal Diet    ACTIVITY: Activity as tolerated    WOUND CARE:     EQUIPMENT needed:       DISCHARGE MEDICATIONS:   See Medication Reconciliation Form    · It is important that you take the medication exactly as they are prescribed. · Keep your medication in the bottles provided by the pharmacist and keep a list of the medication names, dosages, and times to be taken in your wallet. · Do not take other medications without consulting your doctor. NOTIFY YOUR PHYSICIAN FOR ANY OF THE FOLLOWING:   Fever over 101 degrees for 24 hours. Chest pain, shortness of breath, fever, chills, nausea, vomiting, diarrhea, change in mentation, falling, weakness, bleeding. Severe pain or pain not relieved by medications. Or, any other signs or symptoms that you may have questions about.       DISPOSITION:  x  Home With:   OT  PT  HH  RN       SNF/Inpatient Rehab/LTAC    Independent/assisted living    Hospice    Other:     CDMP Checked:   Yes x PROBLEM LIST Updated:  Yes x       Signed:   Prisca Kc MD  9/19/2018  7:28 AM

## 2018-09-19 NOTE — PROGRESS NOTES
Problem: Hypertension  Goal: *Blood pressure within specified parameters  Outcome: Progressing Towards Goal  Following administration of BP meds this AM, BP has remained WNL.     Patient Vitals for the past 24 hrs:   Temp Pulse Resp BP SpO2   09/18/18 2130 - 83 23 123/85 -   09/18/18 2115 - 80 23 117/80 -   09/18/18 2100 - 83 - 110/80 -   09/18/18 2045 - 83 22 127/88 -   09/18/18 2030 - 79 21 107/74 -   09/18/18 2015 - 79 22 111/73 -   09/18/18 2000 - 76 22 105/70 -   09/18/18 1945 - 78 23 100/71 -   09/18/18 1930 - 75 24 108/70 -   09/18/18 1915 - 72 24 103/69 -   09/18/18 1548 97.8 °F (36.6 °C) 82 18 104/62 98 %   09/18/18 1142 98 °F (36.7 °C) 77 14 103/52 97 %   09/18/18 0856 - 81 - (!) 148/93 -   09/18/18 0712 98 °F (36.7 °C) 77 20 122/88 98 %   09/18/18 0309 98 °F (36.7 °C) 75 20 104/57 98 %   09/17/18 2329 98 °F (36.7 °C) 79 20 128/77 98 %

## 2018-09-19 NOTE — PROGRESS NOTES
[facility]  [phone]    Vitals   Pre   Post   Assessment   Pre   Post     Temp  97.5  97.6 LOC  Alert and Oriented x 3 Alert and Oriented x 3   HR   84 83 Lungs   Tachypnea   Tachypnea   B/P   103/74 128/70 Cardiac   S1 S2  S1 S2   Resp   24 23 Skin   Dry and Intact  Dry and Intact   Pain level  0/10 0/10 Edema  None noted     None noted   Orders:    Duration:   Start:    1915 End:     Total:   3.5 hours   Dialyzer:   Dialyzer/Set Up Inspection: Stefania Augustine (09/18/18 1915)   K Bath:   Dialysate K (mEq/L): 2 (09/15/18 1114)   Ca Bath:   Dialysate CA (mEq/L): 2.5 (09/15/18 1114)   Na/Bicarb:   Dialysate NA (mEq/L): 140 (09/15/18 1114)   Target Fluid Removal:   Goal/Amount of Fluid to Remove (mL): 3000 mL (09/13/18 1345)   Access     Type & Location:   CHUY AV Graft   Labs     Obtained/Reviewed   Critical Results Called   Date when labs were drawn-  Hgb-    HGB   Date Value Ref Range Status   09/18/2018 9.4 (L) 11.5 - 16.0 g/dL Final     K-    Potassium   Date Value Ref Range Status   09/18/2018 4.4 3.5 - 5.1 mmol/L Final     Ca-   Calcium   Date Value Ref Range Status   09/18/2018 8.7 8.5 - 10.1 MG/DL Final     Bun-   BUN   Date Value Ref Range Status   09/18/2018 82 (H) 6 - 20 MG/DL Final     Creat-   Creatinine   Date Value Ref Range Status   09/18/2018 8.72 (H) 0.55 - 1.02 MG/DL Final        Medications/ Blood Products Given     Name   Dose   Route and Time     N/A                Blood Volume Processed (BVP):    89.1 Net Fluid   Removed:  3000 ml. Comments   Time Out Done: Yes @ 1610  Primary Nurse Rpt Pre: Enrico Breen RN  Primary Nurse Rpt Post:  Delmis Navarro RN  Pt Education:  Care Plan:  Tx Summary:    CHUY AV Graft difficult to access prior to treatment. Attempt to access x 3. Another Chuy Shad Dobson [id number] RN in to assist with accessing x 3 attempts for a total of 6 attempts. Obtained access for adequate blood flows after 45 minutes. Patient tolerated treatment very well.    At the end of treatment, all possible blood returned with NS 0.9%. Needles removed x 2. Pressure held until bleeding stopped. Secured with gauze and tape. Report given to Franchesca Howard RN. -----------Carlitos Pineda RN. Admiting Diagnosis:  Pt's previous clinic-  Consent signed - Informed Consent Verified: Yes (09/18/18 1915)  [facility] Consent - Yes  Hepatitis Status-  Hep B Negative 6/12/18  Machine #- Machine Number: [id number] (09/18/18 1915)  Telemetry status-  Pre-dialysis wt. - Pre-Dialysis Weight: 62.7 kg (138 lb 3.7 oz) (09/11/18 9569)

## 2018-09-19 NOTE — PROGRESS NOTES
Hospital follow-up PCP transitional care appointment has been scheduled with Marta Hirsch NP for Monday, 9/24/18 at 10:00 a.m. Pending patient discharge.   Nataliia Koch, Care Management Specialist.

## 2018-09-19 NOTE — PROGRESS NOTES
Bedside and Verbal shift change report given to Anu [place] (oncoming nurse) by Clayton Mcdermott (offgoing nurse). Report included the following information SBAR, Kardex, ED Summary, Procedure Summary, Intake/Output, MAR and Recent Results. Bedside and Verbal shift change report given to Temo Gay (oncoming nurse) by Erickson Paez RN (offgoing nurse). Report included the following information SBAR, Kardex, ED Summary, Procedure Summary, Intake/Output, MAR and Recent Results.

## 2018-09-19 NOTE — PROGRESS NOTES
Problem: Falls - Risk of  Goal: *Absence of Falls  Document Alex Fall Risk and appropriate interventions in the flowsheet.    Outcome: Progressing Towards Goal  Fall Risk Interventions:            Medication Interventions: Teach patient to arise slowly, Evaluate medications/consider consulting pharmacy    Elimination Interventions: Call light in reach, Patient to call for help with toileting needs    History of Falls Interventions: Evaluate medications/consider consulting pharmacy

## 2018-09-19 NOTE — DISCHARGE SUMMARY
Discharge Summary       PATIENT ID: Stephanie Kim  MRN: [id number]   YOB: 1985    DATE OF ADMISSION: 9/10/2018  5:41 PM    DATE OF DISCHARGE: 9/19/18   PRIMARY CARE PROVIDER: Kit Rhoades NP     ATTENDING PHYSICIAN: Baltazar Mathews  DISCHARGING PROVIDER: Christiane Vela MD    To contact this individual call [phone] and ask the  to page. If unavailable ask to be transferred the Adult Hospitalist Department. CONSULTATIONS: IP CONSULT TO CARDIOLOGY  IP CONSULT TO NEPHROLOGY  IP CONSULT TO HEMATOLOGY    PROCEDURES/SURGERIES: * No surgery found *    ADMITTING DIAGNOSES & HOSPITAL COURSE:   30 yo woman with dyslipidemia, h/o pulmonary embolism, ESRD on TTS HD, chronic pain syndrome, asthma, systemic lupus erythematosus presented to the ED from home on 9/10/18 with progressive fatigue, generalized body aches, abdominal pain, chest pain.  She was admitted to [facility] with hypertensive urgency.       Assessment & Plan:      Hypertensive urgency (POA)   - has been on and off nicardipine gtt and home meds being adjusted to try and wean off nicardipine gtt  - CT head wo contrast 9/10 unremarkable  - pt reports very difficult to control BP despite medication adherence; will need adjustments to her home medication at the time of discharge  - started minoxidil  On d/c     Elevated troponin with CP (POA)   - nonspecific with ESRD and ECG without evidence of ACS  - Cards following  - TTE 9/12 ED 50-55%, no RWMA, increased LV wall thickness, mild-mod MR, mild TR  - continue BB, ARB     Dyslipidemia - LDL 60 but ; resumed home gemfibrozil     Chronic PE - continue apixaban     ESRD on HD - TTS HD as per Renal     Hyperkalemia - likely due to ESRD; kayexalate prn     Leukopenia, mild, intermittent (POA) - patient is asymptomatic  - Heme/Onc consulted;PRN f/u   - likely due to SLE and medications per Heme/Onc     Chronic pain syndrome and chronic back pain - pain control, continue daily prednisone     SLE with exacerbation (POA) - continue home Plaquenil, prednisone, azathioprine     Asthma - controlled, nebs prn     Code status: full         PENDING TEST RESULTS:   At the time of discharge the following test results are still pending:     FOLLOW UP APPOINTMENTS:    Follow-up Information     Follow up With Details Comments Contact Info    Audi Zhu NP In 1 week  [address]  [phone]             ADDITIONAL CARE RECOMMENDATIONS:     DIET: Cardiac Diet    ACTIVITY: Activity as tolerated    WOUND CARE:     EQUIPMENT needed:       DISCHARGE MEDICATIONS:  Current Discharge Medication List      START taking these medications    Details   minoxidil (LONITEN) 2.5 mg tablet Take 1 Tab by mouth two (2) times a day for 30 days. Qty: 60 Tab, Refills: 0         CONTINUE these medications which have NOT CHANGED    Details   ferric citrate (AURYXIA) 210 mg iron tablet Take 210 mg by mouth three (3) times daily (with meals). losartan (COZAAR) 100 mg tablet Take 100 mg by mouth daily. cloNIDine (CATAPRES) 0.2 mg 1 tab BID for 30 days      oxyCODONE-acetaminophen (PERCOCET) 5-325 mg per tablet Take 1 tablet, two to three times a day, if needed for severe pain. Qty: 75 Tab, Refills: 0    Associated Diagnoses: Chronic bilateral low back pain without sciatica      carvedilol (COREG) 25 mg tablet Take 1 Tab by mouth two (2) times daily (with meals). Qty: 60 Tab, Refills: 0      amLODIPine (NORVASC) 10 mg tablet Take 1 Tab by mouth daily. Qty: 30 Tab, Refills: 0      apixaban (ELIQUIS) 5 mg tablet Take 5 mg by mouth two (2) times a day. Indications: pulmonary thromboembolism      polyethylene glycol (MIRALAX) 17 gram packet Take 17 g by mouth daily as needed. predniSONE (DELTASONE) 5 mg tablet Take 2 Tabs by mouth daily. Qty: 30 Tab, Refills: 0      senna (SENNA) 8.6 mg tablet Take 1 Tab by mouth daily as needed for Constipation.  for constipation amitriptyline (ELAVIL) 25 mg tablet Take 25 mg by mouth nightly. gemfibrozil (LOPID) 600 mg tablet Take 300 mg by mouth daily. azaTHIOprine (IMURAN) 50 mg tablet Take 50 mg by mouth daily (after breakfast). albuterol (PROVENTIL HFA, VENTOLIN HFA, PROAIR HFA) 90 mcg/actuation inhaler Take 1-2 Puffs by inhalation every four (4) hours as needed for Wheezing or Shortness of Breath. Qty: 1 Inhaler, Refills: 2      hydroxychloroquine (PLAQUENIL) 200 mg tablet Take 200 mg by mouth two (2) times a day. pantoprazole (PROTONIX) 40 mg tablet Take 1 Tab by mouth Daily (before breakfast). Qty: 30 Tab, Refills: 0               NOTIFY YOUR PHYSICIAN FOR ANY OF THE FOLLOWING:   Fever over 101 degrees for 24 hours. Chest pain, shortness of breath, fever, chills, nausea, vomiting, diarrhea, change in mentation, falling, weakness, bleeding. Severe pain or pain not relieved by medications. Or, any other signs or symptoms that you may have questions about.     DISPOSITION:  x  Home With:   OT  PT  HH  RN       Long term SNF/Inpatient Rehab    Independent/assisted living    Hospice    Other:       PATIENT CONDITION AT DISCHARGE:     Functional status    Poor    x Deconditioned     Independent      Cognition   x  Lucid     Forgetful     Dementia      Catheters/lines (plus indication)    Franco     PICC     PEG    x None      Code status    x Full code     DNR      PHYSICAL EXAMINATION AT DISCHARGE:     Constitutional: Beckey Radha, no acute distress, cooperative, pleasant    ENT:  oral mucosa moist, oropharynx benign    Resp:  diminished BS   CV:  regular rhythm, normal rate, no m/r/g appreciated, RUE and b/l LE edema, RUE AVG +thrill/bruit    GI:  +BS, soft, non distended, NT    Musculoskeletal:  HENDRICKSON    Neurologic:  AAOx3, NFD                                               CHRONIC MEDICAL DIAGNOSES:  Problem List as of 9/19/2018  Date Reviewed: 9/10/2018          Codes Class Noted - Resolved    ESRD on hemodialysis [facility]) ICD-10-CM: N18.6, Z99.2  ICD-9-CM: 585.6, V45.11  8/27/2018 - Present        SOB (shortness of breath) ICD-10-CM: R06.02  ICD-9-CM: 786.05  8/15/2018 - Present        Rib pain on right side ICD-10-CM: R07.81  ICD-9-CM: 786.50  7/2/2018 - Present        Troponin level elevated ICD-10-CM: R74.8  ICD-9-CM: 790.6  6/17/2018 - Present        Int[facility]abdominal abscess ([facility] ICD-10-CM: K65.1  ICD-9-CM: 567.22  5/12/2018 - Present        Sepsis ([facility] ICD-10-CM: A41.9  ICD-9-CM: 038.9, 995.91  4/29/2018 - Present        Generalized abdominal pain ICD-10-CM: R10.84  ICD-9-CM: 789.07  4/4/2018 - Present        Other constipation ICD-10-CM: K59.09  ICD-9-CM: 564.09  4/4/2018 - Present        Other ascites ICD-10-CM: R18.8  ICD-9-CM: 789.59  4/4/2018 - Present        Peritonitis ([facility] ICD-10-CM: K65.9  ICD-9-CM: 567.9  3/11/2018 - Present        History of pulmonary embolism ICD-10-CM: A78.280  ICD-9-CM: V12.55  12/8/2017 - Present        Hypomagnesemia ICD-10-CM: E83.42  ICD-9-CM: 275.2  10/30/2017 - Present        Hypocalcemia ICD-10-CM: E83.51  ICD-9-CM: 275.41  10/30/2017 - Present        Hypokalemia ICD-10-CM: E87.6  ICD-9-CM: 276.8  10/27/2017 - Present        Hypertension (Chronic) ICD-10-CM: I10  ICD-9-CM: 401.9  10/14/2017 - Present        Chronic bilateral low back pain without sciatica ICD-10-CM: M54.5, G89.29  ICD-9-CM: 724.2, 338.29  5/26/2017 - Present        Anemia of renal disease ICD-10-CM: D63.1  ICD-9-CM: 285.21  2/21/2017 - Present        Dependence on peritoneal dialysis [facility]) ICD-10-CM: Z99.2  ICD-9-CM: V45.11  2/21/2017 - Present        ACP (advance care planning) ICD-10-CM: Z71.89  ICD-9-CM: V65.49  2/21/2017 - Present        Malignant hypertension ICD-10-CM: I10  ICD-9-CM: 401.0  7/11/2016 - Present        Encounter for monitoring opioid maintenance therapy ICD-10-CM: Z51.81, Z79.891  ICD-9-CM: V58.83, V58.69  11/3/2015 - Present        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  ICD-9-CM: 710.0, 583.81 3/10/2012 - Present        Lupus ICD-10-CM: L93.0  ICD-9-CM: 695.4  2/27/2012 - Present        RESOLVED: Neutropenia ([facility] ICD-10-CM: D70.9  ICD-9-CM: 288.00  9/15/2018 - 9/19/2018        RESOLVED: Anemia in chronic kidney disease ICD-10-CM: N18.9, D63.1  ICD-9-CM: 285.21  9/15/2018 - 9/19/2018        * (Principal)RESOLVED: Malignant hypertensive urgency ICD-10-CM: I16.0  ICD-9-CM: 401.0  9/10/2018 - 9/19/2018        RESOLVED: Hypertensive urgency ICD-10-CM: I16.0  ICD-9-CM: 401.9  7/19/2018 - 7/22/2018        RESOLVED: Sepsis ([facility] ICD-10-CM: A41.9  ICD-9-CM: 038.9, 995.91  12/12/2017 - 12/22/2017        RESOLVED: Pneumonia ICD-10-CM: J18.9  ICD-9-CM: 291  10/14/2017 - 12/8/2017        RESOLVED: Pleural effusion, left ICD-10-CM: J90  ICD-9-CM: 511.9  10/14/2017 - 12/8/2017        RESOLVED: ESRD on peritoneal dialysis ([facility] (Chronic) ICD-10-CM: N18.6, Z99.2  ICD-9-CM: 585.6, V45.11  10/7/2016 - 8/27/2018        RESOLVED: Idiopathic chronic inflammatory bowel disease ICD-10-CM: K63.89  ICD-9-CM: 558.9  8/28/2013 - 8/28/2013        RESOLVED: Abdominal pain ICD-10-CM: R10.9  ICD-9-CM: 789.00  8/28/2013 - 9/3/2013        RESOLVED: Hypoxia ICD-10-CM: R09.02  ICD-9-CM: 799.02  4/25/2013 - 4/30/2013        RESOLVED: 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  ICD-9-CM: 710.0, 583.81  4/27/2012 - 4/28/2012              Greater than 30  minutes were spent with the patient on counseling and coordination of care    Signed:   Trever Nair MD  9/19/2018  8:18 AM

## 2018-09-19 NOTE — PROGRESS NOTES
0730: Bedside shift change report given to Gudelia Mahajan RN (oncoming nurse) by Xin Licona RN (offgoing nurse). Report included the following information SBAR, Kardex, ED Summary, MAR, Accordion and Recent Results. 1930: Bedside shift change report given to Adalid Saunders RN (oncoming nurse) by Gudelia Mahajan RN (offgoing nurse). Report included the following information SBAR, Kardex, Intake/Output, MAR and Accordion.

## 2018-09-20 ENCOUNTER — PATIENT OUTREACH (OUTPATIENT)
Dept: FAMILY MEDICINE CLINIC | Age: 33
End: 2018-09-20

## 2018-09-20 NOTE — PROGRESS NOTES
.  Hospital Discharge Follow-Up      Date/Time:  2018 8:57 AM    Patient was admitted to [facility] on 9/10/18 and discharged on 18 for Malignant HTN urgency. The physician discharge summary was available at the time of outreach. Patient was contacted within 1 business days of discharge. Top Challenges reviewed with the provider   -9 days hospital re-admission for HTN, IMCU with nicardipine GTT  -Patient says very difficult to control BP on her current medications  - consider Pharm-D referral  -started on Minoxidil at Discharge  -HD on TTS         Method of communication with provider :face to face, phone, staff message    Inpatient RRAT score: undetermined  Was this a readmission? yes   Patient stated reason for the readmission: \"I went to the Ed because I had nausea and generalized body aches since for 24 hours. \" Patient says she was having \"little chest pain. \" /142, reports taking BP meds for the day, denied headache, vision changes, numbness or tingling. Nurse Navigator (NN) contacted the patient by telephone to perform post hospital discharge assessment. Verified name and  with patient as identifiers. Provided introduction to self, and explanation of the Nurse Navigator role. Reviewed discharge instructions and red flags with patient who verbalized understanding. Patient given an opportunity to ask questions and does not have any further questions or concerns at this time. The patient agrees to contact the PCP office for questions related to their healthcare. NN provided contact information for future reference. Disease Specific:   HTN Urgency    Summary of patient's top problems:  1. HTN-BP remains elevated in spite of taking BP medications as ordered- home on minoxidil  2. ESRD- HD TTHS  3.  Chronic c- pain-on daily prednisone, troponin 0.36 on admission, cardiology following, EF=50-55 on 18    Home Health orders at discharge: none, declined  [address] company: n/a  Date of initial visit: n/a    Durable Medical Equipment ordered/company: none   Durable Medical Equipment received: n/a    Barriers to care? Medication management, says her BP is elevated in spite of her taking medications as ordered. Advance Care Planning:   Does patient have an Advance Directive:   Not on file, patient is full code    Medication(s):   New Medications at Discharge: minoxidil  Changed Medications at Discharge: none  Discontinued Medications at Discharge: none    Medication reconciliation was performed with patient, who verbalizes understanding of administration of home medications. There were barriers to obtaining medications identified at this time. Referral to Pharm D needed: yes     Current Outpatient Prescriptions   Medication Sig    minoxidil (LONITEN) 2.5 mg tablet Take 1 Tab by mouth two (2) times a day for 30 days.  losartan (COZAAR) 100 mg tablet Take 1 Tab by mouth daily for 30 days.  cloNIDine HCl (CATAPRES) 0.2 mg tablet Take 1 Tab by mouth two (2) times a day for 30 days.  oxyCODONE-acetaminophen (PERCOCET) 5-325 mg per tablet 1 tab q6 PRN    ferric citrate (AURYXIA) 210 mg iron tablet Take 210 mg by mouth three (3) times daily (with meals).  carvedilol (COREG) 25 mg tablet Take 1 Tab by mouth two (2) times daily (with meals).  amLODIPine (NORVASC) 10 mg tablet Take 1 Tab by mouth daily.  apixaban (ELIQUIS) 5 mg tablet Take 5 mg by mouth two (2) times a day. Indications: pulmonary thromboembolism    polyethylene glycol (MIRALAX) 17 gram packet Take 17 g by mouth daily as needed.  predniSONE (DELTASONE) 5 mg tablet Take 2 Tabs by mouth daily.  senna (SENNA) 8.6 mg tablet Take 1 Tab by mouth daily as needed for Constipation. for constipation    amitriptyline (ELAVIL) 25 mg tablet Take 25 mg by mouth nightly.  gemfibrozil (LOPID) 600 mg tablet Take 300 mg by mouth daily.     azaTHIOprine (IMURAN) 50 mg tablet Take 50 mg by mouth daily (after breakfast).  albuterol (PROVENTIL HFA, VENTOLIN HFA, PROAIR HFA) 90 mcg/actuation inhaler Take 1-2 Puffs by inhalation every four (4) hours as needed for Wheezing or Shortness of Breath.  hydroxychloroquine (PLAQUENIL) 200 mg tablet Take 200 mg by mouth two (2) times a day.  pantoprazole (PROTONIX) 40 mg tablet Take 1 Tab by mouth Daily (before breakfast). No current facility-administered medications for this visit. There are no discontinued medications. [facility] follow up appointment(s):   Future Appointments  Date Time Provider Rocio Hardy   9/24/2018 10:00 AM Floyd Calderon NP [facility] SCHED   9/26/2018 3:00 PM Ethan Lao MD [address]   12/21/2018 1:00 PM [place], [address]   12/21/2018 2:00 PM Angela Lechuga  [address]      Non-[facility] follow up appointment(s): n/a  [facility]:  n/a       Goals        Patient [address] (pt-stated)            10/19/17- NN did not discuss this with patient. Will plan to discuss with patient during upcoming call. Sc  4/23/18- NN encouraged patient to complete when she comes for her next follow-up visit. Patient verbalized understanding and will schedule. pk  5/21/18- Patient remains full code. Stating Mother is her legal next of kin, will make decisions for her. pk    07/23/18   · Patient remains full code, says mother will make ACP decisions, not interested in completing form at this time.  Patient/Family verbalizes understanding of self-management of  disease. (pt-stated)            11/29/17- Patient verbalized understanding of hypokalemia and treatment plan was review with patient PK    How can you care for yourself at home? · If your doctor recommends it, eat foods that have a lot of potassium. These include fresh fruits, juices, and vegetables. They also include nuts, beans, and milk. · Be safe with medicines.  If your doctor prescribes medicines or potassium supplements, take them exactly as directed. Call your doctor if you have any problems with your medicines. · Get your potassium levels tested as often as your doctor tells you.    4/25/18- Patient verbalized understanding of plan of care, attending dialysis and appointments as scheduled. Was seen by [facility] health on 4/23/18. pk    4/30/18-   · Patient educated on diet restrictions at discharge,  · NN will continue attempts to reach for follow-up assessment and educate on diet restrictions. · Patient attending dialysis at this time on 5/1/18. pk  ·   07/23/18   · H-Dialysis continure on TTH and Sat, Patient verbalized understanding pk  · Patient verbalized decline for home care at this time. pk    5/21/18- discharge instructions and medication reconciliation reviewed. Patient verbalized understanding of plan of care. pk  6/29/18 Reviewed discharge instructions and medications with patient. Reminded to call Alexi Mcgee, for f/u appt per hospital rec. Also to schedule f/u with surgeon, , for 2 weeks, and Dr.Deep Feliz(neph) for one week. NN sched SURJIT with pcp, will see  on 7/2 at 11am.mbt    07/23/18   · Verbalized understanding of disease process, when and how to call for help. · NN reviewed discharge instructions and ask patient to log her BPs in log book to bring to office. · NN will follow up in one week. pk    07/27/18   · Patient sent to [facility] for SOB during Dialysis on 7/26/18. Patient without acute distress on admission to ED. O2 sat at 96%. · Patient discharged home-no change in plan of care  · NN will follow in one week. pk  8/21/18- [facility] 8/15-8/20. Jovita Riser 09/20/18   · Reminded to check bp qd and record results. · Notify NN/provider of elevated levels- > 170/90. · NN will follow in one week. pk       Prevention of infection (pt-stated)            10/19/17- patient will assess/closely monitor her temperatures  and report fevers greater than 101.    Patient will assess hydration status by consuming at least six cups of water to avoid dehydration. Patient will take Levaquin as prescribed. sc  10/31/17- NN instructed patient on importance of taking all medications as ordered, follow-ups as scheduled- PK    Call your doctor now or seek immediate medical care if:  ? · You cough up dark brown or bloody mucus (sputum). ? · You have new or worse trouble breathing. ? · You are dizzy or lightheaded, or you feel like you may faint. ? Watch closely for changes in your health, and be sure to contact your doctor if:  ? · You have a new or higher fever. ? · You are coughing more deeply or more often. ? · You are not getting better after 2 days (48 hours). 6/29/18 ? · You do not get better as expected. [facility] 6/16-6/28 sepsis/pneumonia. Reviewed signs/symptoms above to watch for and notify NN/provider. Scheduled SURJIT for 7/2 11am with . And reminded of importance of this visit. (reminded she has appt at 1pm with neuro). mbt             4/23/18- Discharge instructions, appointments and medications reviewed. Patient verbalized understanding. pk    5/1/18- discharged on Levaquin 500mg every 48 hrs,take after Dialysis   5/21/18- Patient was discharged on Augmentin for 7 days, started on 5/18-5/25/18. Home with drainage bag, instructed on flushing and care. pk         Other     Attends follow-up appointments as directed. 07/27/18   · Patient canceled PCP follow-up for today due to Ed visit on 7/26/18  · Re-scheduled for follow-up with PCP on 7/31/18  · NN will follow up in one week. pk    8/21/18  [facility] 8/15=8/20. SURJIT with pcp is 8/23 3pm with KHANH Suero. Reminded to schedule f/u with rheumatologist and nephrologist.mbt  09/20/18  · Reminded of follow-up SURJIT for 9/24/18  · [facility]  · NN will follow in one week.        Patient maintains an effective breathing pattern, as evidenced by relaxed breathing at normal rate and depth and absence of dyspnea.            07/27/18  · Patient instructed on breathing exercises, how to maintain a clear airway by encouraging patient to mobilize own secretions with successful coughing. This facilitates adequate clearance of secretions. · Patient educated on Evaluating level of anxiety. Hypoxia and sensation of not being able to breathe are frightening and may worsen hypoxia. · Instructed on scheduling activities to avoid fatigue and provide for rest periods, limiting fluids, and sodium to reduce fluid overload. · NN will follow up in one week    8/21/18- [facility] 8/15-8/20  · Denies any sob, reminded of above recommendations regarding providing for rest periods, limiting fluids and sodium to reduce fluid overload.  Reporting any symptoms asap.mbt

## 2018-09-24 ENCOUNTER — TELEPHONE (OUTPATIENT)
Dept: FAMILY MEDICINE CLINIC | Age: 33
End: 2018-09-24

## 2018-09-24 NOTE — TELEPHONE ENCOUNTER
Patient is calling in regards to being able to reschedule appt. Patient was to be seen in office today September 24, 2018 10:00 AM for a SURJIT with NP. [address] [place]. Patient states she was D/C from hospital on Wednesday, she was admitted on the Monday 9/10/18 for her blood pressure. She is requesting a call back in regards to a f/u date and time where she will be able to be seen in office with her PCP.      Best call back [phone]

## 2018-09-24 NOTE — TELEPHONE ENCOUNTER
[phone] spoke to patient wants to be seen for [place] Wednesday or Friday per patient Tuesday and Thursday she has dialysis advised patient appointment 9/26/2018 at [address] [place] [address] patient understand

## 2018-09-26 ENCOUNTER — OFFICE VISIT (OUTPATIENT)
Dept: NEUROLOGY | Age: 33
End: 2018-09-26

## 2018-09-26 VITALS
WEIGHT: 157.7 LBS | BODY MASS INDEX: 26.27 KG/M2 | SYSTOLIC BLOOD PRESSURE: 124 MMHG | DIASTOLIC BLOOD PRESSURE: 82 MMHG | HEART RATE: 96 BPM | HEIGHT: 65 IN

## 2018-09-26 DIAGNOSIS — G89.29 CHRONIC BILATERAL LOW BACK PAIN WITHOUT SCIATICA: Primary | ICD-10-CM

## 2018-09-26 DIAGNOSIS — M54.50 CHRONIC BILATERAL LOW BACK PAIN WITHOUT SCIATICA: Primary | ICD-10-CM

## 2018-09-26 DIAGNOSIS — N18.6 ESRD ON HEMODIALYSIS (HCC): ICD-10-CM

## 2018-09-26 DIAGNOSIS — F11.90 CHRONIC, CONTINUOUS USE OF OPIOIDS: ICD-10-CM

## 2018-09-26 DIAGNOSIS — Z99.2 ESRD ON HEMODIALYSIS (HCC): ICD-10-CM

## 2018-09-26 RX ORDER — OXYCODONE AND ACETAMINOPHEN 5; 325 MG/1; MG/1
1 TABLET ORAL
Qty: 75 TAB | Refills: 0 | Status: SHIPPED | OUTPATIENT
Start: 2018-09-26 | End: 2018-10-24 | Stop reason: SDUPTHER

## 2018-09-26 NOTE — MR AVS SNAPSHOT
Bobby Gonzalez 
 
 
 [place] [address] [address] [phone] Patient: Jerome Chahal MRN: [id number] :1985 Visit Information Date & Time Provider Department Dept. Phone Encounter #  
 2018  3:00 PM Leeroy Escobar MD [facility] [phone] [id number] Follow-up Instructions Return in about 4 weeks (around 10/24/2018). Your Appointments 10/24/2018  3:00 PM  
Follow Up with Leeroy Escobar MD  
[facility]) Appt Note: f/u ir[address] [place] [address] [phone]  
  
   
 [facility]  
  
    
 2018  1:00 PM  
ECHO CARDIOGRAMS 2D with Cassidy Ivey [facility] ([facility]) Appt Note: appt schd by Kandis Anne NP echo for MR 1:00 Dr. Prateek Elmore 2:00 kmr [address] [place]  
[address] [facility]  
  
    
 2018  2:00 PM  
ESTABLISHED PATIENT with Romana Gipson MD  
[facility] ([facility]) Appt Note: appt schd by Kandis Anne NP echo for MR 1:00 Dr. Prateek Elmore 2:00 kmr [address] [place]  
[address] [id number] Upcoming Health Maintenance Date Due DTaP/Tdap/Td series (1 - Tdap) 2007 Pneumococcal 19-64 Highest Risk (2 of 3 - PCV13) 10/1/2010 Influenza Age 5 to Adult 2018 PAP AKA CERVICAL CYTOLOGY 2019 Allergies as of 2018  Review Complete On: 2018 By: Janie Siegel Severity Noted Reaction Type Reactions Compazine [Prochlorperazine Edisylate] High 2016   Systemic Nausea and Vomiting, Palpitations Current Immunizations  Reviewed on 8/20/2018 Name Date Influenza Vaccine 9/12/2017, 9/9/2012 Influenza Vaccine Split 9/1/2011 ZZZ-RETIRED (DO NOT USE) Pneumococcal Vaccine (Unspecified Type) 10/1/2009 Not reviewed this visit You Were Diagnosed With   
  
 Codes Comments Chronic bilateral low back pain without sciatica    -  Primary ICD-10-CM: M54.5, G89.29 ICD-9-CM: 724.2, 338.29 Chronic, continuous use of opioids     ICD-10-CM: F11.90 ICD-9-CM: 305.51 Vitals BP Pulse Height(growth percentile) Weight(growth percentile) BMI OB Status 124/82 (BP 1 Location: Left arm, BP Patient Position: Sitting) 96 5' 5\" (1.651 m) 157 lb 11.2 oz (71.5 kg) 26.24 kg/m2 Medically Induced Smoking Status Never Smoker Vitals History BMI and BSA Data Body Mass Index Body Surface Area  
 26.24 kg/m 2 1.81 m 2 Preferred Pharmacy Pharmacy Name Phone [address], [address] Your Updated Medication List  
  
   
This list is accurate as of 9/26/18  3:38 PM.  Always use your most recent med list.  
  
  
  
  
 albuterol 90 mcg/actuation inhaler Commonly known as:  PROVENTIL HFA, VENTOLIN HFA, PROAIR HFA Take 1-2 Puffs by inhalation every four (4) hours as needed for Wheezing or Shortness of Breath. amitriptyline 25 mg tablet Commonly known as:  ELAVIL Take 25 mg by mouth nightly. amLODIPine 10 mg tablet Commonly known as:  [place] Take 1 Tab by mouth daily. apixaban 5 mg tablet Commonly known as:  Lisa Haver Take 5 mg by mouth two (2) times a day. Indications: pulmonary thromboembolism AURYXIA 210 mg iron tablet Generic drug:  ferric citrate Take 210 mg by mouth three (3) times daily (with meals). azaTHIOprine 50 mg tablet Commonly known as:  [employer] Take 50 mg by mouth daily (after breakfast). carvedilol 25 mg tablet Commonly known as:  Wilmiles Bell Take 1 Tab by mouth two (2) times daily (with meals). cloNIDine HCl 0.2 mg tablet Commonly known as:  CATAPRES Take 1 Tab by mouth two (2) times a day for 30 days. gemfibrozil 600 mg tablet Commonly known as:  LOPID Take 300 mg by mouth daily. losartan 100 mg tablet Commonly known as:  COZAAR Take 1 Tab by mouth daily for 30 days. minoxidil 2.5 mg tablet Commonly known as:  Gris Fonder Take 1 Tab by mouth two (2) times a day for 30 days. oxyCODONE-acetaminophen 5-325 mg per tablet Commonly known as:  PERCOCET Take 1 Tab by mouth two (2) times daily as needed for Pain (moderate to severe  pain). Max Daily Amount: 2 Tabs. May take 1 extra tablet a few days a week if pain is severe. pantoprazole 40 mg tablet Commonly known as:  PROTONIX Take 1 Tab by mouth Daily (before breakfast). PLAQUENIL 200 mg tablet Generic drug:  hydroxychloroquine Take 200 mg by mouth two (2) times a day. polyethylene glycol 17 gram packet Commonly known as:  [employer] Take 17 g by mouth daily as needed. predniSONE 5 mg tablet Commonly known as:  Vanesa Miguel Take 2 Tabs by mouth daily. Senna 8.6 mg tablet Generic drug:  senna Take 1 Tab by mouth daily as needed for Constipation. for constipation Prescriptions Printed Refills  
 oxyCODONE-acetaminophen (PERCOCET) 5-325 mg per tablet 0 Sig: Take 1 Tab by mouth two (2) times daily as needed for Pain (moderate to severe  pain). Max Daily Amount: 2 Tabs. May take 1 extra tablet a few days a week if pain is severe. Class: Print Route: Oral  
  
Follow-up Instructions Return in about 4 weeks (around 10/24/2018). Introducing [facility]! Dear Florencia Leavitt: Thank you for requesting a [employer] account. Our records indicate that you already have an active [employer] account.   You can access your account anytime at [email or web address] Did you know that you can access your hospital and ER discharge instructions at any time in [employer]? You can also review all of your test results from your hospital stay or ER visit. Additional Information If you have questions, please visit the Frequently Asked Questions section of the [employer] website at [email or web address] Remember, [employer] is NOT to be used for urgent needs. For medical emergencies, dial 911. Now available from your iPhone and Android! Please provide this summary of care documentation to your next provider. Your primary care clinician is listed as Hussein Ball. If you have any questions after today's visit, please call [phone].

## 2018-09-26 NOTE — PROGRESS NOTES
Interval HPI:     This is a 29 y.o. female who is following up for     PMHx: chronic back pain, hx of fibromyalgia, hx of DVT/ coumadin, hx of Lupus, mild lower lumbar disc degeneration (L4-5 and L5-S1 with small annular tear at L5-S1)    Chief Complaint   Patient presents with    Follow-up     pain management     Continues to describe pain in lower back moderate. With pain medication, rates average daily pain as 2/ 10  Without pain medication, rates average daily pain as 4-5/ 10  Taking Percocet 5/325 one tab BID  1-2 days a week taking a 3rd Percocet. Tried/ failed: Gabapentin, Cymbalta, Amitriptyline (only helped get to sleep), Lyrica (abnormal behaviors), Hydrocodone (\"too strong\")    UDS Hx:  March 2017: consistent with Rx pain medication  9-13-17 UDS: only screened for codeine or morphine, which pt doesn't take  1-3-18: serum Drug screen: consistent with Rx pain medication (oxycodone)      Brief ROS: as above      Past Medical History:   Diagnosis Date    Anemia     secondary to lupus    Asthma     no inhaler use in past 2 to 3 years    Carditis     Chronic kidney disease     ESRD    Chronic pain     DDD (degenerative disc disease), lumbar     [facility]     GERD (gastroesophageal reflux disease)     Hemodialysis patient ([facility] [address] 12/21/2017    [address]  Tuesday,  Thursday,  and Saturday. 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     Poor historian 01/17/2018    With medications    Thromboembolus [facility]) 2013    lungs    Transfusion history     Last Transfusion 12/21/2017  [facility]       Past Surgical History:   Procedure Laterality Date    HX  SECTION  11/2006    x1    HX OTHER SURGICAL  9/16/15    INSERTION PD CATH; Removed 2017    HX VASCULAR ACCESS Right 2017    Double-Lumen henry catheter upper chest       Family History   Problem Relation Age of Onset    Diabetes Father     Hypertension Father     Cancer Other      aunt with breast cancer    Diabetes Mother        Social History     Social History    Marital status: SINGLE     Spouse name: N/A    Number of children: N/A    Years of education: N/A     Occupational History    Not on file. Social History Main Topics    Smoking status: Never Smoker    Smokeless tobacco: Never Used    Alcohol use No    Drug use: Yes     Special: Prescription, OTC    Sexual activity: Not Currently     Other Topics Concern     Service No    Blood Transfusions No    Caffeine Concern No    Occupational Exposure No    Hobby Hazards No    Sleep Concern No    Stress Concern No    Weight Concern No    Special Diet No    Back Care Yes     low back pain    Exercise No    Bike Helmet No    Seat Belt Yes    Self-Exams No     Social History Narrative    Lives with parents and daughter. Allergies   Allergen Reactions    Compazine [Prochlorperazine Edisylate] Nausea and Vomiting and Palpitations     Current Outpatient Prescriptions   Medication Sig Dispense Refill    oxyCODONE-acetaminophen (PERCOCET) 5-325 mg per tablet Take 1 Tab by mouth two (2) times daily as needed for Pain (moderate to severe  pain). Max Daily Amount: 2 Tabs. May take 1 extra tablet a few days a week if pain is severe. 75 Tab 0    minoxidil (LONITEN) 2.5 mg tablet Take 1 Tab by mouth two (2) times a day for 30 days. 60 Tab 0    losartan (COZAAR) 100 mg tablet Take 1 Tab by mouth daily for 30 days. 30 Tab 0    cloNIDine HCl (CATAPRES) 0.2 mg tablet Take 1 Tab by mouth two (2) times a day for 30 days.  60 Tab 0    ferric citrate (AURYXIA) 210 mg iron tablet Take 210 mg by mouth three (3) times daily (with meals).  carvedilol (COREG) 25 mg tablet Take 1 Tab by mouth two (2) times daily (with meals). 60 Tab 0    amLODIPine (NORVASC) 10 mg tablet Take 1 Tab by mouth daily. 30 Tab 0    apixaban (ELIQUIS) 5 mg tablet Take 5 mg by mouth two (2) times a day. Indications: pulmonary thromboembolism      polyethylene glycol (MIRALAX) 17 gram packet Take 17 g by mouth daily as needed.  predniSONE (DELTASONE) 5 mg tablet Take 2 Tabs by mouth daily. 30 Tab 0    senna (SENNA) 8.6 mg tablet Take 1 Tab by mouth daily as needed for Constipation. for constipation      amitriptyline (ELAVIL) 25 mg tablet Take 25 mg by mouth nightly.  gemfibrozil (LOPID) 600 mg tablet Take 300 mg by mouth daily.  azaTHIOprine (IMURAN) 50 mg tablet Take 50 mg by mouth daily (after breakfast).  albuterol (PROVENTIL HFA, VENTOLIN HFA, PROAIR HFA) 90 mcg/actuation inhaler Take 1-2 Puffs by inhalation every four (4) hours as needed for Wheezing or Shortness of Breath. 1 Inhaler 2    hydroxychloroquine (PLAQUENIL) 200 mg tablet Take 200 mg by mouth two (2) times a day.  pantoprazole (PROTONIX) 40 mg tablet Take 1 Tab by mouth Daily (before breakfast). 30 Tab 0       Physical Exam  Vitals:    09/26/18 1516   BP: 124/82   BP 1 Location: Left arm   BP Patient Position: Sitting   Pulse: 96   Weight: 71.5 kg (157 lb 11.2 oz)   Height: 5' 5\" (1.651 m)       Awake, alert, cachectic resting in chair, conversant  Appears generally weak  Ext: dialysis fistula in right forearm    MSK:  Lumbar spine:  + pain with back extension > flexion      Focused Neurological Exam     Mental status:   Alert and oriented to person, place situation  Mood appears stable  Normal thought processes    CNs: EOMI, Face symmetric, Hearing/ Language normal  Sensory: intact light touch  Motor: 4/ 5 strength in arms and legs    Reflexes: not tested  Gait: ambulates short distance normally    Impression    ICD-10-CM ICD-9-CM    1.  Chronic bilateral low back pain without sciatica M54.5 724.2 oxyCODONE-acetaminophen (PERCOCET) 5-325 mg per tablet    G89.29 338.29    2. Chronic, continuous use of opioids F11.90 305.51 oxyCODONE-acetaminophen (PERCOCET) 5-325 mg per tablet   3.  ESRD on hemodialysis (HCC) N18.6 585.6     Z99.2 V45.11         1) Chronic bilateral lower back pain without sciatica  Renewed Rx Percocet 5/ 325 one tab BID-TID depending on pain level (#75, no RF)    2) ESRD/ hemodialysis  Complicates patient's pain management as pt is anuric and cannot supply urine for UDS testing    3) Follow up in 4 weeks       Signed By: Erich Boyce MD     September 26, 2018

## 2018-10-02 ENCOUNTER — PATIENT OUTREACH (OUTPATIENT)
Dept: FAMILY MEDICINE CLINIC | Age: 33
End: 2018-10-02

## 2018-10-02 NOTE — PROGRESS NOTES
Outbound call to patient to complete SURJIT follow up. Patient verified by 3 identifiers. Patient has had PCP and Neurology follow up since discharge. Continue HD 3X weekly on TTSa. Verbalized that  she is keeping record of BPs and, patient was No Show for her PCP appointment on 9/26/18. Patient was seen on 9/26/18 by  [facility] SISTER [facility], [facility]    BP has remained around 124//70s since discharge. Patient has F/u with neurology again in one month. Medication review completed. Patient given an opportunity to ask questions and does not have any further questions or concerns at this time. Patient agrees to contact the PCP office for questions related to their healthcare.  NN provided contact information for future reference

## 2018-10-09 ENCOUNTER — PATIENT OUTREACH (OUTPATIENT)
Dept: FAMILY MEDICINE CLINIC | Age: 33
End: 2018-10-09

## 2018-10-09 NOTE — PROGRESS NOTES
Outbound call to patient to complete SURJIT follow-up, to check on patient's status, and do a medication review . Unable to complete medication review during this encounter. NN was not able to reach patient on today's call. NN was able to speak to grandmother, who states, Del Yañez is doing much better, she stays here and has not said anything about having problems with BP. She is at dialysis now. \"   Patient has followed up with appointment and dialysis as scheduled. No ED or hospital visits listed in last 30 days. Patient has NN contact information for questions and concerns.

## 2018-10-16 ENCOUNTER — PATIENT OUTREACH (OUTPATIENT)
Dept: FAMILY MEDICINE CLINIC | Age: 33
End: 2018-10-16

## 2018-10-16 NOTE — PROGRESS NOTES
Outbound call to patient to complete SURJIT assessment. Patient verified by 3 identifiers. Patient admits to doing better with no further episodes of SOB or severe HTN since started on minoxidil   . Verbalizes BP up slightly prior to dialysis, but comes down following therapy. NN completed medication review with no new changes. Patient denies any red flags, question or concerns about care. Patient scheduled to follow-up with Neurology on 10/24/18. Case management Plan:  NN will continue to attempt contacts with patient by telephone or during office visit within next 7-10 days.  Will continue to follow as necessary for the remaining 30 days post visit and will reassess to see if CCM assessment is needed following discharge

## 2018-10-24 ENCOUNTER — OFFICE VISIT (OUTPATIENT)
Dept: NEUROLOGY | Age: 33
End: 2018-10-24

## 2018-10-24 VITALS
HEART RATE: 97 BPM | RESPIRATION RATE: 20 BRPM | WEIGHT: 157 LBS | HEIGHT: 65 IN | DIASTOLIC BLOOD PRESSURE: 78 MMHG | OXYGEN SATURATION: 98 % | SYSTOLIC BLOOD PRESSURE: 130 MMHG | BODY MASS INDEX: 26.16 KG/M2

## 2018-10-24 DIAGNOSIS — G89.29 CHRONIC BILATERAL LOW BACK PAIN WITHOUT SCIATICA: ICD-10-CM

## 2018-10-24 DIAGNOSIS — F11.90 CHRONIC, CONTINUOUS USE OF OPIOIDS: ICD-10-CM

## 2018-10-24 DIAGNOSIS — M54.50 CHRONIC BILATERAL LOW BACK PAIN WITHOUT SCIATICA: ICD-10-CM

## 2018-10-24 RX ORDER — OXYCODONE AND ACETAMINOPHEN 5; 325 MG/1; MG/1
1 TABLET ORAL
Qty: 75 TAB | Refills: 0 | Status: SHIPPED | OUTPATIENT
Start: 2018-10-24 | End: 2018-11-19 | Stop reason: SDUPTHER

## 2018-10-24 NOTE — PROGRESS NOTES
Interval HPI:     This is a 29 y.o. female who is following up for     PMHx: chronic back pain, hx of fibromyalgia, hx of DVT/ coumadin, hx of Lupus, mild lower lumbar disc degeneration (L4-5 and L5-S1 with small annular tear at L5-S1)    Chief Complaint   Patient presents with    Follow-up     pain management      No hospitalizations since last visit. Says BP has been staying down/ under control, and keeps close contact with her Nephrologist (on dialysis). Reports that she's been more active, able to walk a few blocks now. Continues to describe pain in lower back moderate. With pain medication, rates average daily pain as 2-3/ 10  Without pain medication, rates average daily pain as 7-8/ 10  Taking Percocet 5/325 one tab BID  2 days a week taking a 3rd Percocet. Tried/ failed: Gabapentin, Cymbalta, Amitriptyline (only helped get to sleep), Lyrica (abnormal behaviors), Hydrocodone (\"too strong\")    UDS Hx:  March 2017: consistent with Rx pain medication  9-13-17 UDS: only screened for codeine or morphine, which pt doesn't take  1-3-18: serum Drug screen: consistent with Rx pain medication (oxycodone)      Brief ROS: as above      Past Medical History:   Diagnosis Date    Anemia     secondary to lupus    Asthma     no inhaler use in past 2 to 3 years    Carditis     Chronic kidney disease     ESRD    Chronic pain     DDD (degenerative disc disease), lumbar     [facility] [address]     GERD (gastroesophageal reflux disease)     Hemodialysis patient ([facility] [address] 12/21/2017    [address]  Tuesday,  Thursday,  and Saturday.     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  Poor historian 2018    With medications    Thromboembolus ([address] 2013    lungs    Transfusion history     Last Transfusion 2017  at [facility]       Past Surgical History:   Procedure Laterality Date    HX  SECTION  11/2006    x1    HX OTHER SURGICAL  9/16/15    INSERTION PD CATH; Removed 2017    HX VASCULAR ACCESS Right 2017    Double-Lumen henry catheter upper chest       Family History   Problem Relation Age of Onset    Diabetes Father     Hypertension Father     Cancer Other         aunt with breast cancer    Diabetes Mother        Social History     Socioeconomic History    Marital status: SINGLE     Spouse name: Not on file    Number of children: Not on file    Years of education: Not on file    Highest education level: Not on file   Social Needs    Financial resource strain: Not on file    Food insecurity - worry: Not on file    Food insecurity - inability: Not on file   [employer] needs - medical: Not on file   [employer] needs - non-medical: Not on file   Occupational History    Not on file   Tobacco Use    Smoking status: Never Smoker    Smokeless tobacco: Never Used   Substance and Sexual Activity    Alcohol use: No    Drug use: Yes     Types: Prescription, OTC    Sexual activity: Not Currently   Other Topics Concern     Service No    Blood Transfusions No    Caffeine Concern No    Occupational Exposure No    Hobby Hazards No    Sleep Concern No    Stress Concern No    Weight Concern No    Special Diet No    Back Care Yes     Comment: low back pain    Exercise No    Bike Helmet No    Seat Belt Yes    Self-Exams No   Social History Narrative    Lives with parents and daughter.        Allergies   Allergen Reactions    Compazine [Prochlorperazine Edisylate] Nausea and Vomiting and Palpitations     Current Outpatient Medications   Medication Sig Dispense Refill    oxyCODONE-acetaminophen (PERCOCET) 5-325 mg per tablet Take 1 Tab by mouth two (2) times daily as needed for Pain (moderate to severe  pain). Max Daily Amount: 2 Tabs. May take 1 extra tablet a few days a week if pain is severe. 75 Tab 0    ferric citrate (AURYXIA) 210 mg iron tablet Take 210 mg by mouth three (3) times daily (with meals).  carvedilol (COREG) 25 mg tablet Take 1 Tab by mouth two (2) times daily (with meals). 60 Tab 0    amLODIPine (NORVASC) 10 mg tablet Take 1 Tab by mouth daily. 30 Tab 0    apixaban (ELIQUIS) 5 mg tablet Take 5 mg by mouth two (2) times a day. Indications: pulmonary thromboembolism      polyethylene glycol (MIRALAX) 17 gram packet Take 17 g by mouth daily as needed.  predniSONE (DELTASONE) 5 mg tablet Take 2 Tabs by mouth daily. 30 Tab 0    senna (SENNA) 8.6 mg tablet Take 1 Tab by mouth daily as needed for Constipation. for constipation      amitriptyline (ELAVIL) 25 mg tablet Take 25 mg by mouth nightly.  gemfibrozil (LOPID) 600 mg tablet Take 300 mg by mouth daily.  azaTHIOprine (IMURAN) 50 mg tablet Take 50 mg by mouth daily (after breakfast).  albuterol (PROVENTIL HFA, VENTOLIN HFA, PROAIR HFA) 90 mcg/actuation inhaler Take 1-2 Puffs by inhalation every four (4) hours as needed for Wheezing or Shortness of Breath. 1 Inhaler 2    hydroxychloroquine (PLAQUENIL) 200 mg tablet Take 200 mg by mouth two (2) times a day.  pantoprazole (PROTONIX) 40 mg tablet Take 1 Tab by mouth Daily (before breakfast).  30 Tab 0       Physical Exam  Vitals:    10/24/18 1510   BP: 130/78   Pulse: 97   Resp: 20   SpO2: 98%   Weight: 71.2 kg (157 lb)   Height: 5' 5\" (1.651 m)       Awake, alert, more upbeat than prior visits, conversant  Neck: supple  Ext: dialysis fistula in right forearm    MSK:  Lumbar spine:  Minimal pain with back flexion  Mild pain with back extension  Mild pain with facet loading to left > right    Focused Neurological Exam     Mental status:   Alert and oriented to person, place situation  Mood appears stable  Normal thought processes    CNs: EOMI, Face symmetric, Hearing/ Language normal  Sensory: intact light touch  Motor: 4/ 5 strength in arms and legs    Reflexes: 1+ patellars  Gait: ambulates short distance normally    Impression    ICD-10-CM ICD-9-CM    1. Chronic bilateral low back pain without sciatica M54.5 724.2 oxyCODONE-acetaminophen (PERCOCET) 5-325 mg per tablet    G89.29 338.29    2.  Chronic, continuous use of opioids F11.90 305.51 oxyCODONE-acetaminophen (PERCOCET) 5-325 mg per tablet        1) Chronic bilateral lower back pain without sciatica  Pain controlled on Percocet BID (occasionally TID)  Renewed Rx Percocet 5/ 325 one tab BID-TID depending on pain level (#75, no RF)    2) ESRD/ hemodialysis  Complicates patient's pain management as pt is anuric and cannot supply urine for UDS testing    3) Follow up in 4 weeks       Signed By: Joi Saldana MD     October 24, 2018

## 2018-10-24 NOTE — PATIENT INSTRUCTIONS
A Healthy Lifestyle: Care Instructions  Your Care Instructions    A healthy lifestyle can help you feel good, stay at a healthy weight, and have plenty of energy for both work and play. A healthy lifestyle is something you can share with your whole family. A healthy lifestyle also can lower your risk for serious health problems, such as high blood pressure, heart disease, and diabetes. You can follow a few steps listed below to improve your health and the health of your family. Follow-up care is a key part of your treatment and safety. Be sure to make and go to all appointments, and call your doctor if you are having problems. It's also a good idea to know your test results and keep a list of the medicines you take. How can you care for yourself at home? · Do not eat too much sugar, fat, or fast foods. You can still have dessert and treats now and then. The goal is moderation. · Start small to improve your eating habits. Pay attention to portion sizes, drink less juice and soda pop, and eat more fruits and vegetables. ? Eat a healthy amount of food. A 3-ounce serving of meat, for example, is about the size of a deck of cards. Fill the rest of your plate with vegetables and whole grains. ? Limit the amount of soda and sports drinks you have every day. Drink more water when you are thirsty. ? Eat at least 5 servings of fruits and vegetables every day. It may seem like a lot, but it is not hard to reach this goal. A serving or helping is 1 piece of fruit, 1 cup of vegetables, or 2 cups of leafy, raw vegetables. Have an apple or some carrot sticks as an afternoon snack instead of a candy bar. Try to have fruits and/or vegetables at every meal.  · Make exercise part of your daily routine. You may want to start with simple activities, such as walking, bicycling, or slow swimming. Try to be active 30 to 60 minutes every day. You do not need to do all 30 to 60 minutes all at once.  For example, you can exercise 3 times a day for 10 or 20 minutes. Moderate exercise is safe for most people, but it is always a good idea to talk to your doctor before starting an exercise program.  · Keep moving. Luciana Seller the lawn, work in the garden, or [employer]. Take the stairs instead of the elevator at work. · If you smoke, quit. People who smoke have an increased risk for heart attack, stroke, cancer, and other lung illnesses. Quitting is hard, but there are ways to boost your chance of quitting tobacco for good. ? Use nicotine gum, patches, or lozenges. ? Ask your doctor about stop-smoking programs and medicines. ? Keep trying. In addition to reducing your risk of diseases in the future, you will notice some benefits soon after you stop using tobacco. If you have shortness of breath or asthma symptoms, they will likely get better within a few weeks after you quit. · Limit how much alcohol you drink. Moderate amounts of alcohol (up to 2 drinks a day for men, 1 drink a day for women) are okay. But drinking too much can lead to liver problems, high blood pressure, and other health problems. Family health  If you have a family, there are many things you can do together to improve your health. · Eat meals together as a family as often as possible. · Eat healthy foods. This includes fruits, vegetables, lean meats and dairy, and whole grains. · Include your family in your fitness plan. Most people think of activities such as jogging or tennis as the way to fitness, but there are many ways you and your family can be more active. Anything that makes you breathe hard and gets your heart pumping is exercise. Here are some tips:  ? Walk to do errands or to take your child to school or the bus.  ? Go for a family bike ride after dinner instead of watching TV. Where can you learn more? Go to http://jorge-rochelle.info/. Enter O089 in the search box to learn more about \"A Healthy Lifestyle: Care Instructions. \"  Current as of: December 7, 2017  Content Version: 11.8  © 8340-2697 [employer]. Care instructions adapted under license by [employer] (which disclaims liability or warranty for this information). If you have questions about a medical condition or this instruction, always ask your healthcare professional. [address] any warranty or liability for your use of this information.

## 2018-10-30 ENCOUNTER — HOSPITAL ENCOUNTER (EMERGENCY)
Age: 33
Discharge: HOME | End: 2018-10-30
Attending: EMERGENCY MEDICINE
Payer: MEDICARE

## 2018-10-30 ENCOUNTER — APPOINTMENT (OUTPATIENT)
Dept: GENERAL RADIOLOGY | Age: 33
End: 2018-10-30
Attending: EMERGENCY MEDICINE
Payer: MEDICARE

## 2018-10-30 VITALS
HEIGHT: 65 IN | OXYGEN SATURATION: 96 % | TEMPERATURE: 98.5 F | RESPIRATION RATE: 16 BRPM | BODY MASS INDEX: 24.57 KG/M2 | WEIGHT: 147.49 LBS | DIASTOLIC BLOOD PRESSURE: 112 MMHG | SYSTOLIC BLOOD PRESSURE: 173 MMHG | HEART RATE: 115 BPM

## 2018-10-30 DIAGNOSIS — R07.9 CHEST PAIN, UNSPECIFIED TYPE: Primary | ICD-10-CM

## 2018-10-30 LAB
ALBUMIN SERPL-MCNC: 3.9 G/DL (ref 3.5–5)
ALBUMIN/GLOB SERPL: 0.7 {RATIO} (ref 1.1–2.2)
ALP SERPL-CCNC: 103 U/L (ref 45–117)
ALT SERPL-CCNC: 21 U/L (ref 12–78)
ANION GAP SERPL CALC-SCNC: 12 MMOL/L (ref 5–15)
AST SERPL-CCNC: 34 U/L (ref 15–37)
BASOPHILS # BLD: 0 K/UL (ref 0–0.1)
BASOPHILS NFR BLD: 0 % (ref 0–1)
BILIRUB SERPL-MCNC: 0.5 MG/DL (ref 0.2–1)
BUN SERPL-MCNC: 30 MG/DL (ref 6–20)
BUN/CREAT SERPL: 6 (ref 12–20)
CALCIUM SERPL-MCNC: 8.7 MG/DL (ref 8.5–10.1)
CHLORIDE SERPL-SCNC: 95 MMOL/L (ref 97–108)
CO2 SERPL-SCNC: 29 MMOL/L (ref 21–32)
COMMENT, HOLDF: NORMAL
CREAT SERPL-MCNC: 4.71 MG/DL (ref 0.55–1.02)
DIFFERENTIAL METHOD BLD: ABNORMAL
EOSINOPHIL # BLD: 0.1 K/UL (ref 0–0.4)
EOSINOPHIL NFR BLD: 1 % (ref 0–7)
ERYTHROCYTE [DISTWIDTH] IN BLOOD BY AUTOMATED COUNT: 15.3 % (ref 11.5–14.5)
GLOBULIN SER CALC-MCNC: 5.8 G/DL (ref 2–4)
GLUCOSE SERPL-MCNC: 78 MG/DL (ref 65–100)
HCT VFR BLD AUTO: 39.9 % (ref 35–47)
HGB BLD-MCNC: 12.2 G/DL (ref 11.5–16)
IMM GRANULOCYTES # BLD: 0.1 K/UL (ref 0–0.04)
IMM GRANULOCYTES NFR BLD AUTO: 1 % (ref 0–0.5)
LIPASE SERPL-CCNC: 143 U/L (ref 73–393)
LYMPHOCYTES # BLD: 0.5 K/UL (ref 0.8–3.5)
LYMPHOCYTES NFR BLD: 6 % (ref 12–49)
MCH RBC QN AUTO: 28 PG (ref 26–34)
MCHC RBC AUTO-ENTMCNC: 30.6 G/DL (ref 30–36.5)
MCV RBC AUTO: 91.5 FL (ref 80–99)
MONOCYTES # BLD: 0.3 K/UL (ref 0–1)
MONOCYTES NFR BLD: 4 % (ref 5–13)
NEUTS SEG # BLD: 7 K/UL (ref 1.8–8)
NEUTS SEG NFR BLD: 88 % (ref 32–75)
NRBC # BLD: 0 K/UL (ref 0–0.01)
NRBC BLD-RTO: 0 PER 100 WBC
PLATELET # BLD AUTO: 212 K/UL (ref 150–400)
PMV BLD AUTO: 10 FL (ref 8.9–12.9)
POTASSIUM SERPL-SCNC: 3.7 MMOL/L (ref 3.5–5.1)
PROT SERPL-MCNC: 9.7 G/DL (ref 6.4–8.2)
RBC # BLD AUTO: 4.36 M/UL (ref 3.8–5.2)
RBC MORPH BLD: ABNORMAL
SAMPLES BEING HELD,HOLD: NORMAL
SODIUM SERPL-SCNC: 136 MMOL/L (ref 136–145)
TROPONIN I SERPL-MCNC: <0.05 NG/ML
WBC # BLD AUTO: 8 K/UL (ref 3.6–11)

## 2018-10-30 PROCEDURE — 94640 AIRWAY INHALATION TREATMENT: CPT

## 2018-10-30 PROCEDURE — 83690 ASSAY OF LIPASE: CPT | Performed by: EMERGENCY MEDICINE

## 2018-10-30 PROCEDURE — 93005 ELECTROCARDIOGRAM TRACING: CPT

## 2018-10-30 PROCEDURE — 71046 X-RAY EXAM CHEST 2 VIEWS: CPT

## 2018-10-30 PROCEDURE — 77030029684 HC NEB SM VOL KT MONA -A

## 2018-10-30 PROCEDURE — 99283 EMERGENCY DEPT VISIT LOW MDM: CPT

## 2018-10-30 PROCEDURE — 84484 ASSAY OF TROPONIN QUANT: CPT | Performed by: EMERGENCY MEDICINE

## 2018-10-30 PROCEDURE — 94664 DEMO&/EVAL PT USE INHALER: CPT

## 2018-10-30 PROCEDURE — 74011250636 HC RX REV CODE- 250/636: Performed by: EMERGENCY MEDICINE

## 2018-10-30 PROCEDURE — 74011250637 HC RX REV CODE- 250/637: Performed by: EMERGENCY MEDICINE

## 2018-10-30 PROCEDURE — 74011000250 HC RX REV CODE- 250: Performed by: EMERGENCY MEDICINE

## 2018-10-30 PROCEDURE — 85025 COMPLETE CBC W/AUTO DIFF WBC: CPT | Performed by: EMERGENCY MEDICINE

## 2018-10-30 PROCEDURE — 96374 THER/PROPH/DIAG INJ IV PUSH: CPT

## 2018-10-30 PROCEDURE — 36415 COLL VENOUS BLD VENIPUNCTURE: CPT | Performed by: EMERGENCY MEDICINE

## 2018-10-30 PROCEDURE — 80053 COMPREHEN METABOLIC PANEL: CPT | Performed by: EMERGENCY MEDICINE

## 2018-10-30 RX ORDER — ACETAMINOPHEN 325 MG/1
650 TABLET ORAL
Status: COMPLETED | OUTPATIENT
Start: 2018-10-30 | End: 2018-10-30

## 2018-10-30 RX ADMIN — ACETAMINOPHEN 650 MG: 325 TABLET, FILM COATED ORAL at 19:22

## 2018-10-30 RX ADMIN — METHYLPREDNISOLONE SODIUM SUCCINATE 125 MG: 125 INJECTION, POWDER, FOR SOLUTION INTRAMUSCULAR; INTRAVENOUS at 17:25

## 2018-10-30 RX ADMIN — ALBUTEROL SULFATE 1 DOSE: 2.5 SOLUTION RESPIRATORY (INHALATION) at 17:05

## 2018-10-30 NOTE — DISCHARGE INSTRUCTIONS
Chest Pain: Care Instructions  Your Care Instructions    There are many things that can cause chest pain. Some are not serious and will get better on their own in a few days. But some kinds of chest pain need more testing and treatment. Your doctor may have recommended a follow-up visit in the next 8 to 12 hours. If you are not getting better, you may need more tests or treatment. Even though your doctor has released you, you still need to watch for any problems. The doctor carefully checked you, but sometimes problems can develop later. If you have new symptoms or if your symptoms do not get better, get medical care right away. If you have worse or different chest pain or pressure that lasts more than 5 minutes or you passed out (lost consciousness), call 911 or seek other emergency help right away. A medical visit is only one step in your treatment. Even if you feel better, you still need to do what your doctor recommends, such as going to all suggested follow-up appointments and taking medicines exactly as directed. This will help you recover and help prevent future problems. How can you care for yourself at home? · Rest until you feel better. · Take your medicine exactly as prescribed. Call your doctor if you think you are having a problem with your medicine. · Do not drive after taking a prescription pain medicine. When should you call for help? Call 911 if:    · You passed out (lost consciousness).     · You have severe difficulty breathing.     · You have symptoms of a heart attack. These may include:  ? Chest pain or pressure, or a strange feeling in your chest.  ? Sweating. ? Shortness of breath. ? Nausea or vomiting. ? Pain, pressure, or a strange feeling in your back, neck, jaw, or upper belly or in one or both shoulders or arms. ? Lightheadedness or sudden weakness. ? A fast or irregular heartbeat.   After you call 911, the  may tell you to chew 1 adult-strength or 2 to 4 low-dose aspirin. Wait for an ambulance. Do not try to drive yourself.    Call your doctor today if:    · You have any trouble breathing.     · Your chest pain gets worse.     · You are dizzy or lightheaded, or you feel like you may faint.     · You are not getting better as expected.     · You are having new or different chest pain. Where can you learn more? Go to http://jorge-rochelle.info/. Enter A120 in the search box to learn more about \"Chest Pain: Care Instructions. \"  Current as of: November 20, 2017  Content Version: 11.8  © 8246-1382 [employer]. Care instructions adapted under license by [employer] (which disclaims liability or warranty for this information). If you have questions about a medical condition or this instruction, always ask your healthcare professional. [address] any warranty or liability for your use of this information.

## 2018-10-30 NOTE — ED TRIAGE NOTES
Pt to ED for c/o mid CP and SOB on exertion since waking this morning. Pt has dialysis Tues/Thurs/Sat, had complete session today. Denies N/V. Reports mild nonproductive cough.

## 2018-10-30 NOTE — ED NOTES
Pt ambulatory out of ED with discharge instructions and prescriptions in hand given by Dr. Hugo Martínez; pt verbalized understanding of discharge paperwork and time allotted for questions. VSS. Pt alert and oriented.

## 2018-10-30 NOTE — ED PROVIDER NOTES
HPI   Pt reports that she awoke today with chest pain that radiates to her upper back. She states that she attended dialysis and has had episodic pain throughout the day. Denies fever, cough, cold symptoms, headche, neck pain, visual changes, focal weakness or rash. Denies any difficulty breathing, difficulty swallowing, SOB or abdominal pain. Denies any nausea, vomiting or diarrhea. Pt. Reports that she has not had any breathing treatments or pain medications today prior to arrival.  PMH is significant for anemia, Lupus, asthma, carditis, CKD, chronic pain, DDD, GERD, HTN, PE      Past Medical History:   Diagnosis Date    Anemia     secondary to lupus    Asthma     no inhaler use in past 2 to 3 years    Carditis     Chronic kidney disease     ESRD    Chronic pain     DDD (degenerative disc disease), lumbar     [facility]) [facility]     GERD (gastroesophageal reflux disease)     Hemodialysis patient ([facility] 2017    [address]  Tuesday,  Thursday,  and Saturday. 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  Poor historian 2018    With medications    Thromboembolus ([facility] 2013    lungs    Transfusion history     Last Transfusion 2017  at [facility]       Past Surgical History:   Procedure Laterality Date    HX  SECTION  11/2006    x1    HX OTHER SURGICAL  9/16/15    INSERTION PD CATH;  Removed 2017    HX VASCULAR ACCESS Right 2017    Double-Lumen henry catheter upper chest         Family History:   Problem Relation Age of Onset    Diabetes Father     Hypertension Father     Cancer Other         aunt with breast cancer    Diabetes Mother        Social History     Socioeconomic History    Marital status: SINGLE     Spouse name: Not on file    Number of children: Not on file    Years of education: Not on file    Highest education level: Not on file   Social Needs    Financial resource strain: Not on file    Food insecurity - worry: Not on file    Food insecurity - inability: Not on file    Transportation needs - medical: Not on file   [employer] needs - non-medical: Not on file   Occupational History    Not on file   Tobacco Use    Smoking status: Never Smoker    Smokeless tobacco: Never Used   Substance and Sexual Activity    Alcohol use: No    Drug use: Yes     Types: Prescription, OTC    Sexual activity: Not Currently   Other Topics Concern     Service No    Blood Transfusions No    Caffeine Concern No    Occupational Exposure No    Hobby Hazards No    Sleep Concern No    Stress Concern No    Weight Concern No    Special Diet No    Back Care Yes     Comment: low back pain    Exercise No    Bike Helmet No    Seat Belt Yes    Self-Exams No   Social History Narrative    Lives with parents and daughter. ALLERGIES: Compazine [prochlorperazine edisylate]    Review of Systems   Constitutional: Positive for activity change and appetite change. Respiratory: Positive for cough. Cardiovascular: Positive for chest pain. Musculoskeletal: Positive for arthralgias. All other systems reviewed and are negative. Vitals:    10/30/18 1542 10/30/18 1706   BP: (!) 173/112    Pulse: (!) 115    Resp: 16    Temp: 98.5 °F (36.9 °C)    SpO2: 95% 96%   Weight: 66.9 kg (147 lb 7.8 oz)    Height: 5' 5\" (1.651 m)             Physical Exam   Constitutional: She is oriented to person, place, and time. Chronically ill appearing black female on dialysis; former smoker   HENT:   Head: Normocephalic. Neck: Normal range of motion. Cardiovascular: Regular rhythm. Tachycardia present.    Pulmonary/Chest: Effort normal and breath sounds normal.   Abdominal: Soft. Bowel sounds are normal.   Musculoskeletal: Normal range of motion. Lymphadenopathy:     She has cervical adenopathy. Neurological: She is alert and oriented to person, place, and time. Skin: Skin is warm and dry. Nursing note and vitals reviewed. MDM       Procedures    EKG reveals sinus tachycardia with a ventricular rate of 110; without ectopy. Reviewed by Dr. Bebe Rivera and me. Rj Coe NP  7:14 PM  Patient's results and plan of care have been reviewed with her and her mom. Patient and/or family have verbally conveyed their understanding and agreement of the patient's signs, symptoms, diagnosis, treatment and prognosis and additionally agree to follow up as recommended or return to the Emergency Room should her condition change prior to follow-up. Discharge instructions have also been provided to the patient with some educational information regarding her diagnosis as well a list of reasons why she would want to return to the ER prior to her follow-up appointment should her condition change. Rj Coe NP

## 2018-10-31 ENCOUNTER — PATIENT OUTREACH (OUTPATIENT)
Dept: FAMILY MEDICINE CLINIC | Age: 33
End: 2018-10-31

## 2018-10-31 LAB
ATRIAL RATE: 110 BPM
CALCULATED P AXIS, ECG09: 53 DEGREES
CALCULATED R AXIS, ECG10: 51 DEGREES
CALCULATED T AXIS, ECG11: -18 DEGREES
DIAGNOSIS, 93000: NORMAL
P-R INTERVAL, ECG05: 158 MS
Q-T INTERVAL, ECG07: 356 MS
QRS DURATION, ECG06: 84 MS
QTC CALCULATION (BEZET), ECG08: 481 MS
VENTRICULAR RATE, ECG03: 110 BPM

## 2018-10-31 NOTE — PROGRESS NOTES
Outbound call to patient to complete SURJIT follow-up. Patient verified by 3 identifiers. Patient states she was seen in Ed again on 10/30/18 following dialysis for chest discomfort. Patient had BP of 173/112 on arrival to Ed. HR was 115. Patient states she can tell when her BP is elevated, because she always get a headache. BP today was 103/87 and patient denies chest pain. HR today is 99. Patient admits to feeling much better. Decline follow-up with PCP at this time. Medication reconciliation completed. NN contact information given for questions and concerns.

## 2018-11-07 ENCOUNTER — PATIENT OUTREACH (OUTPATIENT)
Dept: FAMILY MEDICINE CLINIC | Age: 33
End: 2018-11-07

## 2018-11-07 NOTE — PROGRESS NOTES
Outbound call to complete SURJIT assessment  Verified by 3 identifiers  Completed appointment as scheduled  Patient admits to feeling much better. Decline follow-up with PCP at this time. Medication reconciliation completed. NN contact information given for questions and concerns.

## 2018-11-16 ENCOUNTER — HOSPITAL ENCOUNTER (EMERGENCY)
Age: 33
Discharge: HOME | End: 2018-11-16
Attending: EMERGENCY MEDICINE | Admitting: EMERGENCY MEDICINE
Payer: MEDICARE

## 2018-11-16 ENCOUNTER — APPOINTMENT (OUTPATIENT)
Dept: CT IMAGING | Age: 33
End: 2018-11-16
Attending: EMERGENCY MEDICINE
Payer: MEDICARE

## 2018-11-16 ENCOUNTER — TELEPHONE (OUTPATIENT)
Dept: NEUROLOGY | Age: 33
End: 2018-11-16

## 2018-11-16 VITALS
HEIGHT: 65 IN | OXYGEN SATURATION: 97 % | RESPIRATION RATE: 18 BRPM | HEART RATE: 93 BPM | BODY MASS INDEX: 25.24 KG/M2 | SYSTOLIC BLOOD PRESSURE: 156 MMHG | TEMPERATURE: 98.5 F | WEIGHT: 151.5 LBS | DIASTOLIC BLOOD PRESSURE: 113 MMHG

## 2018-11-16 DIAGNOSIS — M25.50 ARTHRALGIA, UNSPECIFIED JOINT: ICD-10-CM

## 2018-11-16 DIAGNOSIS — N18.6 ESRD ON HEMODIALYSIS (HCC): ICD-10-CM

## 2018-11-16 DIAGNOSIS — Z99.2 ESRD ON HEMODIALYSIS (HCC): ICD-10-CM

## 2018-11-16 DIAGNOSIS — I10 HYPERTENSION, UNSPECIFIED TYPE: ICD-10-CM

## 2018-11-16 DIAGNOSIS — K29.70 GASTRITIS WITHOUT BLEEDING, UNSPECIFIED CHRONICITY, UNSPECIFIED GASTRITIS TYPE: Primary | ICD-10-CM

## 2018-11-16 LAB
ALBUMIN SERPL-MCNC: 3.3 G/DL (ref 3.5–5)
ALBUMIN/GLOB SERPL: 0.8 {RATIO} (ref 1.1–2.2)
ALP SERPL-CCNC: 102 U/L (ref 45–117)
ALT SERPL-CCNC: 13 U/L (ref 12–78)
ANION GAP SERPL CALC-SCNC: 10 MMOL/L (ref 5–15)
AST SERPL-CCNC: 21 U/L (ref 15–37)
BASOPHILS # BLD: 0 K/UL (ref 0–0.1)
BASOPHILS NFR BLD: 1 % (ref 0–1)
BILIRUB SERPL-MCNC: 0.4 MG/DL (ref 0.2–1)
BUN SERPL-MCNC: 48 MG/DL (ref 6–20)
BUN/CREAT SERPL: 6 (ref 12–20)
CALCIUM SERPL-MCNC: 7.4 MG/DL (ref 8.5–10.1)
CHLORIDE SERPL-SCNC: 98 MMOL/L (ref 97–108)
CO2 SERPL-SCNC: 31 MMOL/L (ref 21–32)
CREAT SERPL-MCNC: 8.36 MG/DL (ref 0.55–1.02)
DIFFERENTIAL METHOD BLD: ABNORMAL
EOSINOPHIL # BLD: 0 K/UL (ref 0–0.4)
EOSINOPHIL NFR BLD: 1 % (ref 0–7)
ERYTHROCYTE [DISTWIDTH] IN BLOOD BY AUTOMATED COUNT: 16.1 % (ref 11.5–14.5)
GLOBULIN SER CALC-MCNC: 4.4 G/DL (ref 2–4)
GLUCOSE SERPL-MCNC: 101 MG/DL (ref 65–100)
HCT VFR BLD AUTO: 35.7 % (ref 35–47)
HGB BLD-MCNC: 11.1 G/DL (ref 11.5–16)
IMM GRANULOCYTES # BLD: 0 K/UL (ref 0–0.04)
IMM GRANULOCYTES NFR BLD AUTO: 0 % (ref 0–0.5)
LIPASE SERPL-CCNC: 414 U/L (ref 73–393)
LYMPHOCYTES # BLD: 0.8 K/UL (ref 0.8–3.5)
LYMPHOCYTES NFR BLD: 28 % (ref 12–49)
MAGNESIUM SERPL-MCNC: 1.8 MG/DL (ref 1.6–2.4)
MCH RBC QN AUTO: 27.4 PG (ref 26–34)
MCHC RBC AUTO-ENTMCNC: 31.1 G/DL (ref 30–36.5)
MCV RBC AUTO: 88.1 FL (ref 80–99)
MONOCYTES # BLD: 0.3 K/UL (ref 0–1)
MONOCYTES NFR BLD: 11 % (ref 5–13)
NEUTS SEG # BLD: 1.7 K/UL (ref 1.8–8)
NEUTS SEG NFR BLD: 59 % (ref 32–75)
NRBC # BLD: 0 K/UL (ref 0–0.01)
NRBC BLD-RTO: 0 PER 100 WBC
PHOSPHATE SERPL-MCNC: 5.6 MG/DL (ref 2.6–4.7)
PLATELET # BLD AUTO: 197 K/UL (ref 150–400)
PMV BLD AUTO: 9.7 FL (ref 8.9–12.9)
POTASSIUM SERPL-SCNC: 4 MMOL/L (ref 3.5–5.1)
PROT SERPL-MCNC: 7.7 G/DL (ref 6.4–8.2)
RBC # BLD AUTO: 4.05 M/UL (ref 3.8–5.2)
SODIUM SERPL-SCNC: 139 MMOL/L (ref 136–145)
WBC # BLD AUTO: 2.9 K/UL (ref 3.6–11)

## 2018-11-16 PROCEDURE — 80053 COMPREHEN METABOLIC PANEL: CPT

## 2018-11-16 PROCEDURE — 84100 ASSAY OF PHOSPHORUS: CPT

## 2018-11-16 PROCEDURE — 74011250636 HC RX REV CODE- 250/636: Performed by: EMERGENCY MEDICINE

## 2018-11-16 PROCEDURE — 36415 COLL VENOUS BLD VENIPUNCTURE: CPT

## 2018-11-16 PROCEDURE — 74176 CT ABD & PELVIS W/O CONTRAST: CPT

## 2018-11-16 PROCEDURE — 83735 ASSAY OF MAGNESIUM: CPT

## 2018-11-16 PROCEDURE — 96361 HYDRATE IV INFUSION ADD-ON: CPT

## 2018-11-16 PROCEDURE — 83690 ASSAY OF LIPASE: CPT

## 2018-11-16 PROCEDURE — 85025 COMPLETE CBC W/AUTO DIFF WBC: CPT

## 2018-11-16 PROCEDURE — 99282 EMERGENCY DEPT VISIT SF MDM: CPT

## 2018-11-16 PROCEDURE — 96375 TX/PRO/DX INJ NEW DRUG ADDON: CPT

## 2018-11-16 PROCEDURE — 96374 THER/PROPH/DIAG INJ IV PUSH: CPT

## 2018-11-16 RX ORDER — DIPHENHYDRAMINE HYDROCHLORIDE 50 MG/ML
50 INJECTION, SOLUTION INTRAMUSCULAR; INTRAVENOUS
Status: COMPLETED | OUTPATIENT
Start: 2018-11-16 | End: 2018-11-16

## 2018-11-16 RX ORDER — DICYCLOMINE HYDROCHLORIDE 20 MG/1
20 TABLET ORAL EVERY 6 HOURS
Qty: 20 TAB | Refills: 0 | Status: SHIPPED | OUTPATIENT
Start: 2018-11-16 | End: 2018-11-21

## 2018-11-16 RX ORDER — ONDANSETRON 2 MG/ML
8 INJECTION INTRAMUSCULAR; INTRAVENOUS
Status: COMPLETED | OUTPATIENT
Start: 2018-11-16 | End: 2018-11-16

## 2018-11-16 RX ORDER — KETOROLAC TROMETHAMINE 30 MG/ML
30 INJECTION, SOLUTION INTRAMUSCULAR; INTRAVENOUS
Status: COMPLETED | OUTPATIENT
Start: 2018-11-16 | End: 2018-11-16

## 2018-11-16 RX ORDER — ONDANSETRON 8 MG/1
8 TABLET, ORALLY DISINTEGRATING ORAL
Qty: 15 TAB | Refills: 0 | Status: SHIPPED | OUTPATIENT
Start: 2018-11-16 | End: 2019-07-27

## 2018-11-16 RX ORDER — ACETAMINOPHEN 500 MG
1000 TABLET ORAL
Qty: 30 TAB | Refills: 0 | Status: SHIPPED | OUTPATIENT
Start: 2018-11-16 | End: 2019-10-18

## 2018-11-16 RX ADMIN — KETOROLAC TROMETHAMINE 30 MG: 30 INJECTION, SOLUTION INTRAMUSCULAR at 20:42

## 2018-11-16 RX ADMIN — ONDANSETRON 8 MG: 2 INJECTION INTRAMUSCULAR; INTRAVENOUS at 20:42

## 2018-11-16 RX ADMIN — SODIUM CHLORIDE 500 ML: 900 INJECTION, SOLUTION INTRAVENOUS at 20:41

## 2018-11-16 RX ADMIN — DIPHENHYDRAMINE HYDROCHLORIDE 50 MG: 50 INJECTION, SOLUTION INTRAMUSCULAR; INTRAVENOUS at 20:42

## 2018-11-16 NOTE — TELEPHONE ENCOUNTER
----- Message from Itzel Handy sent at 11/16/2018 12:50 PM EST -----  Regarding: Dr. Andrade Malik   Pt requested a call back to r/s current f/u appt before 2 PM on the same day if poss (or sooner than what is available on schedule due to a personal scheduling conflict). Best contact [phone].

## 2018-11-17 NOTE — ED PROVIDER NOTES
The patient is a 41-year-old female with a past medical history significant for asthma, lupus, chronic kidney disease, on hemodialysis dependent, anemia, chronic pain, gastroparesis, hypertension, thromboembolism and chronic anticoagulation with Xarelto, who presents to the ED with the complaint of generalized body aches and pain for 8 hours accompanied by nausea and abdominal cramps that began after the patient ate Ranch dressing for lunch this afternoon. The patient described a dull and throbbing discomfort, severity 8/10, accompanied by nausea and diaphoresis. The patient denies any fever, cough, congestion, headache, neck, and back pain, chest pain, shortness of breath, diarrhea, constipation, dysuria, hematuria, vaginal discharge or bleeding, dizziness, weakness, and numbness, sick contact, skin rash. Past Medical History:   Diagnosis Date    Anemia     secondary to lupus    Asthma     no inhaler use in past 2 to 3 years    Carditis     Chronic kidney disease     ESRD    Chronic pain     DDD (degenerative disc disease), lumbar     ESRD (end stage renal disease) (HCC)     GERD (gastroesophageal reflux disease)     Hemod[facility] patient ([facility] [address] 2017    [address]  Tuesday,  Thursday,  and Saturday. 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  Poor historian 2018    With medications    Thromboembolus ([facility] [address] 2013    lungs    Transfusion history     Last Transfusion 2017  at [facility]       Past Surgical History:   Procedure Laterality Date    HX  SECTION  11/2006    x1    HX OTHER SURGICAL  9/16/15    INSERTION PD CATH;  Removed 2017    HX VASCULAR ACCESS Right 12/2017    Double-Lumen henry catheter upper chest         Family History:   Problem Relation Age of Onset   [address] Diabetes Father     Hypertension Father     Cancer Other         aunt with breast cancer    Diabetes Mother        Social History     Socioeconomic History    Marital status: SINGLE     Spouse name: Not on file    Number of children: Not on file    Years of education: Not on file    Highest education level: Not on file   Social Needs    Financial resource strain: Not on file    Food insecurity - worry: Not on file    Food insecurity - inability: Not on file   [employer] needs - medical: Not on file   [employer] needs - non-medical: Not on file   Occupational History    Not on file   Tobacco Use    Smoking status: Never Smoker    Smokeless tobacco: Never Used   Substance and Sexual Activity    Alcohol use: No    Drug use: Yes     Types: Prescription, OTC    Sexual activity: Not Currently   Other Topics Concern     Service No    Blood Transfusions No    Caffeine Concern No    Occupational Exposure No    Hobby Hazards No    Sleep Concern No    Stress Concern No    Weight Concern No    Special Diet No    Back Care Yes     Comment: low back pain    Exercise No    Bike Helmet No    Seat Belt Yes    Self-Exams No   Social History Narrative    Lives with parents and daughter. ALLERGIES: Compazine [prochlorperazine edisylate] and Compazine [prochlorperazine]    Review of Systems   All other systems reviewed and are negative. Vitals:    11/16/18 2009 11/16/18 2013 11/16/18 2053   BP:  (!) 183/120 (!) 170/116   Pulse:  93    Resp:  18    Temp:  98.5 °F (36.9 °C)    SpO2:  99%    Weight: 68.7 kg (151 lb 8 oz)     Height: 5' 5\" (1.651 m)              Physical Exam   Nursing note and vitals reviewed.      CONSTITUTIONAL: Well-appearing; well-nourished; in no apparent distress  HEAD: Normocephalic; atraumatic  EYES: PERRL; EOM intact; conjunctiva and sclera are clear bilaterally. ENT: No rhinorrhea; normal pharynx with no tonsillar hypertrophy; mucous membranes pink/moist, no erythema, no exudate. NECK: Supple; non-tender; no cervical lymphadenopathy  CARD: Normal S1, S2; no murmurs, rubs, or gallops. Regular rate and rhythm. RESP: Normal respiratory effort; breath sounds clear and equal bilaterally; no wheezes, rhonchi, or rales. ABD: Normal bowel sounds; non-distended; non-tender; no palpable organomegaly, no masses, no bruits. Back Exam: Normal inspection; no vertebral point tenderness, no CVA tenderness. Normal range of motion. EXT: Normal ROM in all four extremities; non-tender to palpation; no swelling or deformity; distal pulses are normal, no edema. SKIN: Warm; dry; no rash. NEURO:Alert and oriented x 3, coherent, SKIP-XII grossly intact, sensory and motor are non-focal.        MDM  Number of Diagnoses or Management Options  Arthralgia, unspecified joint:   ESRD on hemodialysis ([facility] [address]):   Gastritis without bleeding, unspecified chronicity, unspecified gastritis type:   Hypertension, unspecified type:   Diagnosis management comments: Assessment: 43-year-old female with history of diabetes and end-stage renal disease on chronic hemodialysis, who presents with abdominal cramps, nausea, and body aches. The patient appears hemodynamically stable. Differential diagnosis include gastroparesis/ gastritis/ pancreatitis-. Rule out electrolyte abnormality/ bowel instruction and dehydration. Plan: lab/ IV fluid/ antiemetic and analgesia/ CT scan of the abdomen and pelvis/ p.o. challenge/ Monitor and Reevaluate.          Amount and/or Complexity of Data Reviewed  Clinical lab tests: ordered and reviewed  Tests in the radiology section of CPT®: ordered and reviewed  Tests in the medicine section of CPT®: reviewed and ordered  Discussion of test results with the performing providers: yes  Decide to obtain previous medical records or to obtain history from someone other than the patient: yes  Obtain history from someone other than the patient: yes  Review and summarize past medical records: yes  Discuss the patient with other providers: yes  Independent visualization of images, tracings, or specimens: yes    Risk of Complications, Morbidity, and/or Mortality  Presenting problems: moderate  Diagnostic procedures: moderate  Management options: moderate           Procedures    Progress Note:   Pt has been reexamined by Yessenia Daniel MD. Pt is feeling much better. Symptoms have improved. All available results have been reviewed with pt and any available family. The patient tolerated p.o. Challenge well. She denies any further discomfortPt understands sx, dx, and tx in ED. Care plan has been outlined and questions have been answered. Pt is ready to go home. Will send home on gastroparesis and abdominal pain Instructions. Prescription of Zofran, Bentyl, and Tylenol. Outpatient referral with PCP as needed. Written by Yessenia Daniel MD,10:29 PM    .   .

## 2018-11-17 NOTE — DISCHARGE INSTRUCTIONS
Gastritis: Care Instructions  Your Care Instructions    Gastritis is a sore and upset stomach. It happens when something irritates the stomach lining. Many things can cause it. These include an infection such as the flu or something you ate or drank. Medicines or a sore on the lining of the stomach (ulcer) also can cause it. Your belly may bloat and ache. You may belch, vomit, and feel sick to your stomach. You should be able to relieve the problem by taking medicine. And it may help to change your diet. If gastritis lasts, your doctor may prescribe medicine. Follow-up care is a key part of your treatment and safety. Be sure to make and go to all appointments, and call your doctor if you are having problems. It's also a good idea to know your test results and keep a list of the medicines you take. How can you care for yourself at home? · If your doctor prescribed antibiotics, take them as directed. Do not stop taking them just because you feel better. You need to take the full course of antibiotics. · Be safe with medicines. If your doctor prescribed medicine to decrease stomach acid, take it as directed. Call your doctor if you think you are having a problem with your medicine. · Do not take any other medicine, including over-the-counter pain relievers, without talking to your doctor first.  · If your doctor recommends over-the-counter medicine to reduce stomach acid, such as Pepcid AC, Prilosec, Tagamet HB, or Zantac 75, follow the directions on the label. · Drink plenty of fluids (enough so that your urine is light yellow or clear like water) to prevent dehydration. Choose water and other caffeine-free clear liquids. If you have kidney, heart, or liver disease and have to limit fluids, talk with your doctor before you increase the amount of fluids you drink. · Limit how much alcohol you drink. · Avoid coffee, tea, cola drinks, chocolate, and other foods with caffeine.  They increase stomach acid.  When should you call for help? Call 911 anytime you think you may need emergency care. For example, call if:    · You vomit blood or what looks like coffee grounds.     · You pass maroon or very bloody stools.    Call your doctor now or seek immediate medical care if:    · You start breathing fast and have not produced urine in the last 8 hours.     · You cannot keep fluids down.    Watch closely for changes in your health, and be sure to contact your doctor if:    · You do not get better as expected. Where can you learn more? Go to http://jorge-rochelle.info/. Enter [id number] in the search box to learn more about \"Gastritis: Care Instructions. \"  Current as of: March 28, 2018  Content Version: 11.8  © 5358-0141 [employer]. Care instructions adapted under license by [employer] (which disclaims liability or warranty for this information). If you have questions about a medical condition or this instruction, always ask your healthcare professional. [place] [address] any warranty or liability for your use of this information. Abdominal Pain: Care Instructions  Your Care Instructions    Abdominal pain has many possible causes. Some aren't serious and get better on their own in a few days. Others need more testing and treatment. If your pain continues or gets worse, you need to be rechecked and may need more tests to find out what is wrong. You may need surgery to correct the problem. Don't ignore new symptoms, such as fever, nausea and vomiting, urination problems, pain that gets worse, and dizziness. These may be signs of a more serious problem. Your doctor may have recommended a follow-up visit in the next 8 to 12 hours. If you are not getting better, you may need more tests or treatment. The doctor has checked you carefully, but problems can develop later.  If you notice any problems or new symptoms, get medical treatment right away.  Follow-up care is a key part of your treatment and safety. Be sure to make and go to all appointments, and call your doctor if you are having problems. It's also a good idea to know your test results and keep a list of the medicines you take. How can you care for yourself at home? · Rest until you feel better. · To prevent dehydration, drink plenty of fluids, enough so that your urine is light yellow or clear like water. Choose water and other caffeine-free clear liquids until you feel better. If you have kidney, heart, or liver disease and have to limit fluids, talk with your doctor before you increase the amount of fluids you drink. · If your stomach is upset, eat mild foods, such as rice, dry toast or crackers, bananas, and applesauce. Try eating several small meals instead of two or three large ones. · Wait until 48 hours after all symptoms have gone away before you have spicy foods, alcohol, and drinks that contain caffeine. · Do not eat foods that are high in fat. · Avoid anti-inflammatory medicines such as aspirin, ibuprofen (Advil, Motrin), and naproxen (Aleve). These can cause stomach upset. Talk to your doctor if you take daily aspirin for another health problem. When should you call for help? Call 911 anytime you think you may need emergency care. For example, call if:    · You passed out (lost consciousness).     · You pass maroon or very bloody stools.     · You vomit blood or what looks like coffee grounds.     · You have new, severe belly pain.    Call your doctor now or seek immediate medical care if:    · Your pain gets worse, especially if it becomes focused in one area of your belly.     · You have a new or higher fever.     · Your stools are black and look like tar, or they have streaks of blood.     · You have unexpected vaginal bleeding.     · You have symptoms of a urinary tract infection. These may include:  ? Pain when you urinate. ? Urinating more often than usual.  ?  Blood in your urine.     · You are dizzy or lightheaded, or you feel like you may faint.    Watch closely for changes in your health, and be sure to contact your doctor if:    · You are not getting better after 1 day (24 hours). Where can you learn more? Go to http://jorge-rochelle.info/. Enter O996 in the search box to learn more about \"Abdominal Pain: Care Instructions. \"  Current as of: November 20, 2017  Content Version: 11.8  © 9710-2043 [employer]. Care instructions adapted under license by [employer] (which disclaims liability or warranty for this information). If you have questions about a medical condition or this instruction, always ask your healthcare professional. [address] any warranty or liability for your use of this information.

## 2018-11-17 NOTE — ED TRIAGE NOTES
Patient arrives with c/o generalized body aches and abdominal pain onset today. Generalized body aches started this morning, patient denies fever. Abdominal pain started today alsop after eating a salad dressing that tasted \"funny\". Patient on dialysis for lupus.

## 2018-11-19 ENCOUNTER — PATIENT OUTREACH (OUTPATIENT)
Dept: FAMILY MEDICINE CLINIC | Age: 33
End: 2018-11-19

## 2018-11-19 ENCOUNTER — OFFICE VISIT (OUTPATIENT)
Dept: NEUROLOGY | Age: 33
End: 2018-11-19

## 2018-11-19 VITALS
HEIGHT: 65 IN | DIASTOLIC BLOOD PRESSURE: 98 MMHG | SYSTOLIC BLOOD PRESSURE: 160 MMHG | WEIGHT: 153 LBS | BODY MASS INDEX: 25.49 KG/M2

## 2018-11-19 DIAGNOSIS — F11.90 CHRONIC, CONTINUOUS USE OF OPIOIDS: Primary | ICD-10-CM

## 2018-11-19 DIAGNOSIS — M54.50 CHRONIC BILATERAL LOW BACK PAIN WITHOUT SCIATICA: ICD-10-CM

## 2018-11-19 DIAGNOSIS — G89.29 CHRONIC BILATERAL LOW BACK PAIN WITHOUT SCIATICA: ICD-10-CM

## 2018-11-19 LAB
COMMENT, HOLDF: NORMAL
SAMPLES BEING HELD,HOLD: NORMAL

## 2018-11-19 RX ORDER — OXYCODONE AND ACETAMINOPHEN 5; 325 MG/1; MG/1
1 TABLET ORAL
Qty: 75 TAB | Refills: 0 | Status: SHIPPED | OUTPATIENT
Start: 2018-11-19 | End: 2018-12-17 | Stop reason: SDUPTHER

## 2018-11-19 NOTE — PROGRESS NOTES
Interval HPI:     This is a 29 y.o. female who is following up for     PMHx: chronic back pain, hx of fibromyalgia, hx of DVT/ coumadin, hx of Lupus, mild lower lumbar disc degeneration (L4-5 and L5-S1 with small annular tear at L5-S1)    Chief Complaint   Patient presents with    Pain (Chronic)     Hasn't been in hospital for the past couple months    Continues to describe pain in lower back moderate. With pain medication, rates average daily pain as 5-6/ 10  Occasionally pain gets up to 7-8/ 10  Taking Percocet 5/325 one tab BID  About 3 days a week taking a 3rd Percocet. Tried/ failed: Gabapentin, Cymbalta, Amitriptyline (only helped get to sleep), Lyrica (abnormal behaviors), Hydrocodone (\"too strong\")    Reviewed : no red flags; only filling pain medications from this office    UDS Hx:  March 2017: consistent with Rx pain medication  9-13-17 UDS: only screened for codeine or morphine, which pt doesn't take  1-3-18: serum Drug screen: consistent with Rx pain medication (oxycodone)        Brief ROS: as above      Past Medical History:   Diagnosis Date    Anemia     secondary to lupus    Asthma     no inhaler use in past 2 to 3 years    Carditis     Chronic kidney disease     ESRD    Chronic pain     DDD (degenerative disc disease), lumbar     [facility] stage renal disease) [facility]     GERD (gastroesophageal reflux disease)     Hemodialysis patient ([facility] 12/21/2017    [address]  Tuesday,  Thursday,  and Saturday. 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     Poor historian 01/17/2018    With medications    Thromboembolus [facility]) 2013    lungs    Transfusion history     Last Transfusion 2017  at [facility]       Past Surgical History:   Procedure Laterality Date    HX  SECTION  11/2006    x1    HX OTHER SURGICAL  9/16/15    INSERTION PD CATH; Removed 2017    HX VASCULAR ACCESS Right 2017    Double-Lumen henry catheter upper chest       Family History   Problem Relation Age of Onset    Diabetes Father     Hypertension Father     Cancer Other         aunt with breast cancer    Diabetes Mother        Social History     Socioeconomic History    Marital status: SINGLE     Spouse name: Not on file    Number of children: Not on file    Years of education: Not on file    Highest education level: Not on file   Social Needs    Financial resource strain: Not on file    Food insecurity - worry: Not on file    Food insecurity - inability: Not on file   [employer] needs - medical: Not on file   [place] needs - non-medical: Not on file   Occupational History    Not on file   Tobacco Use    Smoking status: Never Smoker    Smokeless tobacco: Never Used   Substance and Sexual Activity    Alcohol use: No    Drug use: Yes     Types: Prescription, OTC    Sexual activity: Not Currently   Other Topics Concern     Service No    Blood Transfusions No    Caffeine Concern No    Occupational Exposure No    Hobby Hazards No    Sleep Concern No    Stress Concern No    Weight Concern No    Special Diet No    Back Care Yes     Comment: low back pain    Exercise No    Bike Helmet No    Seat Belt Yes    Self-Exams No   Social History Narrative    Lives with parents and daughter. Allergies   Allergen Reactions    Compazine [Prochlorperazine Edisylate] Nausea and Vomiting and Palpitations    Compazine [Prochlorperazine] Other (comments)     Hot and lightheaded.      Current Outpatient Medications   Medication Sig Dispense Refill    oxyCODONE-acetaminophen (PERCOCET) 5-325 mg per tablet Take 1 Tab by mouth two (2) times daily as needed for Pain (moderate to severe  pain). Max Daily Amount: 2 Tabs. May take 1 extra tablet a few days a week if pain is severe. 75 Tab 0    ondansetron (ZOFRAN ODT) 8 mg disintegrating tablet Take 1 Tab by mouth every eight (8) hours as needed for Nausea. 15 Tab 0    dicyclomine (BENTYL) 20 mg tablet Take 1 Tab by mouth every six (6) hours for 20 doses. 20 Tab 0    ferric citrate (AURYXIA) 210 mg iron tablet Take 210 mg by mouth three (3) times daily (with meals).  carvedilol (COREG) 25 mg tablet Take 1 Tab by mouth two (2) times daily (with meals). 60 Tab 0    amLODIPine (NORVASC) 10 mg tablet Take 1 Tab by mouth daily. 30 Tab 0    apixaban (ELIQUIS) 5 mg tablet Take 5 mg by mouth two (2) times a day. Indications: pulmonary thromboembolism      polyethylene glycol (MIRALAX) 17 gram packet Take 17 g by mouth daily as needed.  predniSONE (DELTASONE) 5 mg tablet Take 2 Tabs by mouth daily. 30 Tab 0    senna (SENNA) 8.6 mg tablet Take 1 Tab by mouth daily as needed for Constipation. for constipation      amitriptyline (ELAVIL) 25 mg tablet Take 25 mg by mouth nightly.  gemfibrozil (LOPID) 600 mg tablet Take 300 mg by mouth daily.  azaTHIOprine (IMURAN) 50 mg tablet Take 50 mg by mouth daily (after breakfast).  albuterol (PROVENTIL HFA, VENTOLIN HFA, PROAIR HFA) 90 mcg/actuation inhaler Take 1-2 Puffs by inhalation every four (4) hours as needed for Wheezing or Shortness of Breath. 1 Inhaler 2    hydroxychloroquine (PLAQUENIL) 200 mg tablet Take 200 mg by mouth two (2) times a day.  pantoprazole (PROTONIX) 40 mg tablet Take 1 Tab by mouth Daily (before breakfast). 30 Tab 0    acetaminophen (TYLENOL EXTRA STRENGTH) 500 mg tablet Take 2 Tabs by mouth every six (6) hours as needed for Pain.  30 Tab 0       Physical Exam  Vitals:    11/19/18 1516   BP: (!) 160/98   Weight: 69.4 kg (153 lb)   Height: 5' 5\" (1.651 m)       Awake, alert, conversant  Neck: supple  Ext: dialysis fistula in right forearm    MSK:  Lumbar spine:  Minimal pain with back flexion  Mild pain with back extension  Mild pain with facet loading to left > right    Focused Neurological Exam     Mental status:   Alert and oriented to person, place situation  Mood appears stable  Normal thought processes    CNs: EOMI, Face symmetric, Hearing/ Language normal  Sensory: intact light touch  Motor: 4/ 5 strength in arms and legs    Reflexes: not examined today  Gait: normal    Impression    ICD-10-CM ICD-9-CM    1. Chronic, continuous use of opioids F11.90 305.51 DRUG SCREEN 11 W/CONF, SERUM      oxyCODONE-acetaminophen (PERCOCET) 5-325 mg per tablet   2. Chronic bilateral low back pain without sciatica M54.5 724.2 oxyCODONE-acetaminophen (PERCOCET) 5-325 mg per tablet    G89.29 338.29         1) Chronic bilateral lower back pain without sciatica  Pain controlled on Percocet BID (occasionally TID)  Renewed Rx Percocet 5/ 325 one tab BID-TID depending on pain level (#75, no RF)    2) ESRD/ hemodialysis  Complicates patient's pain management as pt is anuric and cannot supply urine for UDS testing. We have tried checking her Serum Drug Screen at [facility] in the past but they told pt her insurance didn't cover them. Will retry [facility] now that her insurance has changed. If not, pt will need to get Serum Drug Screen done through PCP's office for me to continue prescribing her pain medications.      3) Follow up in 4 weeks       Signed By: Joi Saldana MD     November 19, 2018

## 2018-11-19 NOTE — PROGRESS NOTES
Outbound call to patient to complete SURJIT follow-up, to check on patient's status, and do a medication review . NN was unable to reach patient, but was able to verify patient by 3 identifiers with grandmother, who states she is gone to a doctor's appointment. Patient has followed up with appointment as scheduled. Patient is scheduled to see Dr. Jojo Osuna MD (Neurology) today. Patient had an Ed visit on 11/16/18 for Abdominal Pain, Generalized Body Aches. NN will continue outreachs to patient to complete assessment. Patient has NN contact information for questions and concerns. .  Goals Addressed                 This Visit's Progress       Patient [address] (pt-stated)   On track       07/23/18   · Patient remains full code, says mother will make ACP decisions, not interested in completing form at this time. 10/31/18  · Addressed with patient, who states she has information, mother is decision maker. Encouraged to schedule with NN  · NN will follow up in one week. pk       Patient/Family verbalizes understanding of self-management of  disease. (pt-stated)   On track     4/25/18-   · Patient verbalized understanding of plan of care, attending dialysis and appointments as scheduled. · Was seen by [facility] on 4/23/18. pk    4/30/18-   · Patient educated on diet restrictions at discharge,  · NN will continue attempts to reach for follow-up assessment and educate on diet restrictions. · Patient attending dialysis at this time on 5/1/18. pk  ·   07/23/18   · H-Dialysis continure on TTH and Sat, Patient verbalized understanding pk  · Patient verbalized decline for home care at this time. pk    5/21/18-   · discharge instructions and medication reconciliation reviewed. · Patient verbalized understanding of plan of care. pk  6/29/18 Reviewed discharge instructions and medications with patient. Reminded to call Justnia Morris, for f/u appt per hospital rec.  Also to schedule f/u with surgeon, , for 2 weeks, and Dr.Deep Feliz(neph) for one week. NN sched SURJIT with pcp, will see  on 7/2 at 11am.mbt    07/23/18   · Verbalized understanding of disease process, when and how to call for help. · NN reviewed discharge instructions and ask patient to log her BPs in log book to bring to office. · NN will follow up in one week. pk    07/27/18   · Patient sent to [facility] for SOB during Dialysis on 7/26/18. Patient without acute distress on admission to ED. O2 sat at 96%. · Patient discharged home-no change in plan of care  · NN will follow in one week. pk  8/21/18- [facility] 8/15-8/20. Giuseppe Quiet 09/20/18   · Reminded to check bp qd and record results. · Notify NN/provider of elevated levels- > 170/90. · NN will follow in one week. pk  10/16/18  · Verbalized BP is much better controlled. · Not keeping log as instructed. · Denies SOB. Admits to adherence to plan. pk  · NN will follow in one week. pk  10/31/18  · [facility] Ed on 10/30/18 for chest pain and SOB  · Elevated /112- HR-115  · Patient did not check BP today, encouraged to check and NN will call back  · Returned call at 11:45 Am- BP -103/87, HR-99  · Verbalized willingness to check BP and record daily, Notify Provider if >170/90  · Patient will report and have baseline BP  · NN will follow in one week. pk  11/07/18  · Verbalized doing better, no further episodes of chest pain  · BP remains at stable range of 120-140 sys  · Did not document today. · NN will follow in one week. pk       Prevention of infection (pt-stated)   On track     4/23/18  · Discharge instructions, appointments and medications reviewed. · Patient verbalized understanding. pk    5/1/18  · Discharged on Levaquin 500mg every 48 hrs,take after Dialysis   · Patient was discharged on Augmentin for 7 days, started on 5/18-5/25/18. · Home with drainage bag, instructed on flushing and care. · NN will follow in one week. pk    10/31/18  · Denies red flags, verbalize adherence to plan of care. pk  · Decline PCP follow up for this ED visit  · NN will follow in one week. pk           Other     Attends follow-up appointments as directed. On track     07/27/18   · Patient canceled PCP follow-up for today due to Ed visit on 7/26/18  · Re-scheduled for follow-up with PCP on 7/31/18  · NN will follow up in one week. pk    8/21/18  [facility] 8/15=8/20. SURJIT with pcp is 8/23 3pm with KHANH Suero. Reminded to schedule f/u with rheumatologist and nephrologist.mbt  09/20/18  · Reminded of follow-up SURJIT for 9/24/18  · Decline [facility]  · NN will follow in one week. 11/07/18  · Has followed up as scheduled with all appointments  · Reviewed up coming appointments. · Verbalized understanding of plan of care. · NN will follow in one week. pk  11/19/18   · Patient had Ed visit on 11/16/18 for Abdominal Pain, Generalized Body Aches  · Patient has Neurology follow-up with Dr. Alice Xavier MD today. · NN will follow in one week.  pk    11/19/2018 3:00 PM Han Villagomez MD     ·

## 2018-11-19 NOTE — PROGRESS NOTES
Pill count =  Patient did not bring      UDS obtained and sent = 7/ 2018  Pain contract = on file   made available for MD review.

## 2018-11-28 ENCOUNTER — PATIENT OUTREACH (OUTPATIENT)
Dept: FAMILY MEDICINE CLINIC | Age: 33
End: 2018-11-28

## 2018-11-28 NOTE — PROGRESS NOTES
Outbound call to patient to complete SURJIT follow-up, to check on patient's status, and do a medication review . NN was able to reach and verify patient with 3 identifiers. Patient states she is doing much better. Patient has followed up with appointment as scheduled. No ED or hospital visits listed in last 7 days. Patient has NN contact information for questions and concerns. .  Goals Addressed                 This Visit's Progress       Patient [address] (pt-stated)   No change       07/23/18   · Patient remains full code, says mother will make ACP decisions, not interested in completing form at this time. 10/31/18  · Addressed with patient, who states she has information, mother is decision maker. Encouraged to schedule with NN  · NN will follow up in one week. pk       Patient/Family verbalizes understanding of self-management of  disease. (pt-stated)   On track     4/25/18-   · Patient verbalized understanding of plan of care, attending dialysis and appointments as scheduled. · Was seen by [facility] on 4/23/18. pk    4/30/18-   · Patient educated on diet restrictions at discharge,  · NN will continue attempts to reach for follow-up assessment and educate on diet restrictions. · Patient attending dialysis at this time on 5/1/18. pk  ·   07/23/18   · H-Dialysis continure on TTH and Sat, Patient verbalized understanding pk  · Patient verbalized decline for home care at this time. pk    5/21/18-   · discharge instructions and medication reconciliation reviewed. · Patient verbalized understanding of plan of care. pk  6/29/18 Reviewed discharge instructions and medications with patient. Reminded to call Jd Ponce, for f/u appt per hospital rec. Also to schedule f/u with surgeon, , for 2 weeks, and Dr.Deep Feliz(neph) for one week.  NN sched SURJIT with pcp, will see  on 7/2 at 11am.mbt    07/23/18   · Verbalized understanding of disease process, when and how to call for help. · NN reviewed discharge instructions and ask patient to log her BPs in log book to bring to office. · NN will follow up in one week. pk    07/27/18   · Patient sent to [facility] for SOB during Dialysis on 7/26/18. Patient without acute distress on admission to ED. O2 sat at 96%. · Patient discharged home-no change in plan of care  · NN will follow in one week. pk  8/21/18- [facility] 8/15-8/20. Julio Alfonso 09/20/18   · Reminded to check bp qd and record results. · Notify NN/provider of elevated levels- > 170/90. · NN will follow in one week. pk  10/16/18  · Verbalized BP is much better controlled. · Not keeping log as instructed. · Denies SOB. Admits to adherence to plan. pk  · NN will follow in one week. pk  10/31/18  · [facility] Ed on 10/30/18 for chest pain and SOB  · Elevated /112- HR-115  · Patient did not check BP today, encouraged to check and NN will call back  · Returned call at 11:45 Am- BP -103/87, HR-99  · Verbalized willingness to check BP and record daily, Notify Provider if >170/90  · Patient will report and have baseline BP  · NN will follow in one week. pk  11/07/18  · Verbalized doing better, no further episodes of chest pain  · BP remains at stable range of 120-140 sys  · Did not document today. · NN will follow in one week. pk  .11/28/18  · Patient had PCP follow-up on          Other     Attends follow-up appointments as directed. 07/27/18   · Patient canceled PCP follow-up for today due to Ed visit on 7/26/18  · Re-scheduled for follow-up with PCP on 7/31/18  · NN will follow up in one week. pk    8/21/18  [facility] 8/15=8/20. SURJIT with pcp is 8/23 3pm with KHANH Suero. Reminded to schedule f/u with rheumatologist and nephrologist.mbt  09/20/18  · Reminded of follow-up SURJIT for 9/24/18  · Decline [facility]  · NN will follow in one week. 11/07/18  · Has followed up as scheduled with all appointments  · Reviewed up coming appointments.    · Verbalized understanding of plan of care. · NN will follow in one week. pk  11/19/18   · Patient had Ed visit on 11/16/18 for Abdominal Pain, Generalized Body Aches  · Patient has Neurology follow-up with Dr. Dara Rios MD today. · NN will follow in one week. pk    11/19/2018 3:00 PM Desean Mclean MD   11/28/18   · Patient had Neurology appointment on 11/19/18  · Admits to feeling much better  · BP stable at 160s sys. · NN will follow in one week. pk         Patient maintains an effective breathing pattern, as evidenced by relaxed breathing at normal rate and depth and absence of dyspnea.        07/27/18  · Patient instructed on breathing exercises, how to maintain a clear airway by encouraging patient to mobilize own secretions with successful coughing. This facilitates adequate clearance of secretions. · Patient educated on Evaluating level of anxiety. Hypoxia and sensation of not being able to breathe are frightening and may worsen hypoxia. · Instructed on scheduling activities to avoid fatigue and provide for rest periods, limiting fluids, and sodium to reduce fluid overload. · NN will follow up in one week    10/31/18  · Denies any sob, reminded of above recommendations regarding providing for rest periods, limiting fluids and sodium to reduce fluid overload. · Instructed on reporting any symptoms of infection. · NN will follow in one week. pk  11/28/18  · BP remains stable  · Attending all appointment  · Continued dialysis as per schedule  NN will follow in one week.  pk

## 2018-12-05 LAB
AMPHETAMINES SERPL QL SCN: NEGATIVE NG/ML
BARBITURATES SERPL QL SCN: NEGATIVE UG/ML
BENZODIAZ SERPL QL SCN: NEGATIVE NG/ML
CANNABINOIDS SERPL QL SCN: NEGATIVE NG/ML
COCAINE+BZE SERPL QL SCN: NEGATIVE NG/ML
ETHANOL UR-MCNC: NEGATIVE GM/DL
METHADONE SERPL QL SCN: NEGATIVE NG/ML
OPIATES SERPL QL SCN: NEGATIVE NG/ML
OXYCOCONE: 42.3 NG/ML
OXYCODONES CONFIRMATION, 738393: POSITIVE
OXYCODONES, 738315: ABNORMAL NG/ML
OXYMORPHONE, 763902: NEGATIVE NG/ML
PCP SERPL QL SCN: NEGATIVE NG/ML
PROPOXYPH SERPL QL SCN: NEGATIVE NG/ML

## 2018-12-14 ENCOUNTER — PATIENT OUTREACH (OUTPATIENT)
Dept: FAMILY MEDICINE CLINIC | Age: 33
End: 2018-12-14

## 2018-12-14 NOTE — PROGRESS NOTES
Patient has graduated from the Transitions of Care Coordination  program on 12/14/18. Patient's symptoms are stable at this time. Patient/family has the ability to self-manage. Care management goals have been completed at this time. No further nurse navigator follow up scheduled. Goals Addressed                 This Visit's Progress       Patient Stated     COMPLETED: Advance Care Plan (pt-stated)          07/23/18   · Patient remains full code, says mother will make ACP decisions, not interested in completing form at this time. 10/31/18  · Addressed with patient, who states she has information, mother is decision maker. Encouraged to schedule with NN  · NN will follow up in one week. pk       COMPLETED: Patient/Family verbalizes understanding of self-management of  disease. (pt-stated)        4/25/18-   · Patient verbalized understanding of plan of care, attending dialysis and appointments as scheduled. · Was seen by [facility] on 4/23/18. pk    4/30/18-   · Patient educated on diet restrictions at discharge,  · NN will continue attempts to reach for follow-up assessment and educate on diet restrictions. · Patient attending dialysis at this time on 5/1/18. pk  ·   07/23/18   · H-Dialysis continure on TTH and Sat, Patient verbalized understanding pk  · Patient verbalized decline for home care at this time. pk    5/21/18-   · discharge instructions and medication reconciliation reviewed. · Patient verbalized understanding of plan of care. pk  6/29/18 Reviewed discharge instructions and medications with patient. Reminded to call Kenyon Leahy, for f/u appt per hospital rec. Also to schedule f/u with surgeon, , for 2 weeks, and Dr.Deep Feliz(neph) for one week. NN sched SURJIT with pcp, will see  on 7/2 at 11am.mbt    07/23/18   · Verbalized understanding of disease process, when and how to call for help.   · NN reviewed discharge instructions and ask patient to log her BPs in log book to bring to office. · NN will follow up in one week. pk    07/27/18   · Patient sent to [facility] for SOB during Dialysis on 7/26/18. Patient without acute distress on admission to ED. O2 sat at 96%. · Patient discharged home-no change in plan of care  · NN will follow in one week.   8/21/18- [facility] 8/15-8/20. John Maryland 09/20/18   · Reminded to check bp qd and record results. · Notify NN/provider of elevated levels- > 170/90. · NN will follow in one week. pk  10/16/18  · Verbalized BP is much better controlled. · Not keeping log as instructed. · Denies SOB. Admits to adherence to plan. pk  · NN will follow in one week. pk  10/31/18  · [facility] Ed on 10/30/18 for chest pain and SOB  · Elevated /112- HR-115  · Patient did not check BP today, encouraged to check and NN will call back  · Returned call at 11:45 Am- BP -103/87, HR-99  · Verbalized willingness to check BP and record daily, Notify Provider if >170/90  · Patient will report and have baseline BP  · NN will follow in one week. pk  11/07/18  · Verbalized doing better, no further episodes of chest pain  · BP remains at stable range of 120-140 sys  · Did not document today. · NN will follow in one week. pk  .11/28/18  · Patient had PCP follow-up on        COMPLETED: Prevention of infection (pt-stated)        4/23/18  · Discharge instructions, appointments and medications reviewed. · Patient verbalized understanding. pk    5/1/18  · Discharged on Levaquin 500mg every 48 hrs,take after Dialysis   · Patient was discharged on Augmentin for 7 days, started on 5/18-5/25/18. · Home with drainage bag, instructed on flushing and care. · NN will follow in one week. pk    10/31/18  · Denies red flags, verbalize adherence to plan of care. pk  · Decline PCP follow up for this ED visit  · NN will follow in one week. pk           Other     COMPLETED: Attends follow-up appointments as directed.         07/27/18   · Patient canceled PCP follow-up for today due to Ed visit on 7/26/18  · Re-scheduled for follow-up with PCP on 7/31/18  · NN will follow up in one week. pk    8/21/18  [facility] 8/15=8/20. SURJIT with pcp is 8/23 3pm with KHANH Suero. Reminded to schedule f/u with rheumatologist and nephrologist.mbt  09/20/18  · Reminded of follow-up SURJIT for 9/24/18  · Decline [facility]  · NN will follow in one week. 11/07/18  · Has followed up as scheduled with all appointments  · Reviewed up coming appointments. · Verbalized understanding of plan of care. · NN will follow in one week. pk  11/19/18   · Patient had Ed visit on 11/16/18 for Abdominal Pain, Generalized Body Aches  · Patient has Neurology follow-up with Dr. Laura Hirsch MD today. · NN will follow in one week. pk    11/19/2018 3:00 PM Ifrah Flores MD   11/28/18   · Patient had Neurology appointment on 11/19/18  · Admits to feeling much better  · BP stable at 160s sys. · NN will follow in one week. pk         COMPLETED: Patient maintains an effective breathing pattern, as evidenced by relaxed breathing at normal rate and depth and absence of dyspnea.        07/27/18  · Patient instructed on breathing exercises, how to maintain a clear airway by encouraging patient to mobilize own secretions with successful coughing. This facilitates adequate clearance of secretions. · Patient educated on Evaluating level of anxiety. Hypoxia and sensation of not being able to breathe are frightening and may worsen hypoxia. · Instructed on scheduling activities to avoid fatigue and provide for rest periods, limiting fluids, and sodium to reduce fluid overload. · NN will follow up in one week    10/31/18  · Denies any sob, reminded of above recommendations regarding providing for rest periods, limiting fluids and sodium to reduce fluid overload. · Instructed on reporting any symptoms of infection. · NN will follow in one week. pk  11/28/18  · BP remains stable  · Attending all appointment  · Continued dialysis as per schedule  NN will follow in one week. pk            Pt has nurse navigator's contact information for any further questions, concerns, or needs.   Patients upcoming visits:    Future Appointments   Date Time Provider Rocio Hayley   12/17/2018  1:00 PM MD Caroline [address]   12/17/2018  3:00 PM Hazel WAYNE   12/17/2018  3:20 PM Aracelis Guerrier  [address]   4/8/2019 11:00 AM Elliot Manning MD [address] [place]

## 2018-12-17 ENCOUNTER — OFFICE VISIT (OUTPATIENT)
Dept: NEUROLOGY | Age: 33
End: 2018-12-17

## 2018-12-17 VITALS
DIASTOLIC BLOOD PRESSURE: 98 MMHG | WEIGHT: 155 LBS | SYSTOLIC BLOOD PRESSURE: 138 MMHG | BODY MASS INDEX: 25.83 KG/M2 | HEIGHT: 65 IN

## 2018-12-17 DIAGNOSIS — M54.50 CHRONIC BILATERAL LOW BACK PAIN WITHOUT SCIATICA: ICD-10-CM

## 2018-12-17 DIAGNOSIS — F11.90 CHRONIC, CONTINUOUS USE OF OPIOIDS: ICD-10-CM

## 2018-12-17 DIAGNOSIS — G89.29 CHRONIC BILATERAL LOW BACK PAIN WITHOUT SCIATICA: ICD-10-CM

## 2018-12-17 RX ORDER — OXYCODONE AND ACETAMINOPHEN 5; 325 MG/1; MG/1
1 TABLET ORAL
Qty: 65 TAB | Refills: 0 | Status: SHIPPED | OUTPATIENT
Start: 2018-12-17 | End: 2019-01-09 | Stop reason: SDUPTHER

## 2018-12-17 NOTE — PROGRESS NOTES
Patient is here for pain management follow up. Patient states she has been about the same.      Percocet count #1

## 2018-12-17 NOTE — PROGRESS NOTES
Interval HPI:     This is a 29 y.o. female who is following up for     PMHx: chronic back pain, hx of fibromyalgia, hx of DVT/ coumadin, hx of Lupus, mild lower lumbar disc degeneration (L4-5 and L5-S1 with small annular tear at L5-S1)    Chief Complaint   Patient presents with    Follow-up     No significant change in moderate lower back pain   With pain medication, rates average daily pain as 2-4/ 10  Occasionally pain gets up to 7-8/ 10  Taking Percocet 5/325 one tab BID  About 3-4 days a week taking a 3rd Percocet. Tried/ failed: Gabapentin, Cymbalta, Amitriptyline (only helped get to sleep), Lyrica (abnormal behaviors), Hydrocodone (\"too strong\")    Pill count: 1 percocet tablet left   Last filled on 11-19-18    UDS Hx:  March 2017: consistent with Rx pain medication  9-13-17 UDS: only screened for codeine or morphine, which pt doesn't take  1-3-18: serum Drug screen: consistent with Rx pain medication (oxycodone)  11-19-18: serum drug screen; consistent with Rx pain medication (oxycodone)      Brief ROS: as above      Past Medical History:   Diagnosis Date    Anemia     secondary to lupus    Asthma     no inhaler use in past 2 to 3 years    Carditis     Chronic kidney disease     ESRD    Chronic pain     DDD (degenerative disc disease), lumbar     ESRD (end stage renal disease) ([facility]     GERD (gastroesophageal reflux disease)     Hemodialysis patient ([facility] 12/21/2017    [address]  Tuesday,  Thursday,  and Saturday. 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     Poor historian 01/17/2018    With medications    Thromboembolus ([facility] 2013    lungs    Transfusion history     Last Transfusion 2017  at [facility]       Past Surgical History:   Procedure Laterality Date    HX  SECTION  11/2006    x1    HX OTHER SURGICAL  9/16/15    INSERTION PD CATH; Removed 2017    HX VASCULAR ACCESS Right 2017    Double-Lumen henry catheter upper chest       Family History   Problem Relation Age of Onset    Diabetes Father     Hypertension Father     Cancer Other         aunt with breast cancer    Diabetes Mother        Social History     Socioeconomic History    Marital status: SINGLE     Spouse name: Not on file    Number of children: Not on file    Years of education: Not on file    Highest education level: Not on file   Social Needs    Financial resource strain: Not on file    Food insecurity - worry: Not on file    Food insecurity - inability: Not on file   [employer] needs - medical: Not on file   [employer] needs - non-medical: Not on file   Occupational History    Not on file   Tobacco Use    Smoking status: Never Smoker    Smokeless tobacco: Never Used   Substance and Sexual Activity    Alcohol use: No    Drug use: Yes     Types: Prescription, OTC    Sexual activity: Not Currently   Other Topics Concern     Service No    Blood Transfusions No    Caffeine Concern No    Occupational Exposure No    Hobby Hazards No    Sleep Concern No    Stress Concern No    Weight Concern No    Special Diet No    Back Care Yes     Comment: low back pain    Exercise No    Bike Helmet No    Seat Belt Yes    Self-Exams No   Social History Narrative    Lives with parents and daughter. Allergies   Allergen Reactions    Compazine [Prochlorperazine Edisylate] Nausea and Vomiting and Palpitations    Compazine [Prochlorperazine] Other (comments)     Hot and lightheaded.      Current Outpatient Medications   Medication Sig Dispense Refill    oxyCODONE-acetaminophen (PERCOCET) 5-325 mg per tablet Take 1 Tab by mouth two (2) times daily as needed for Pain (moderate to severe  pain). Max Daily Amount: 2 Tabs. May take an extra HALF tablet a few days a week if pain is severe. 65 Tab 0    ondansetron (ZOFRAN ODT) 8 mg disintegrating tablet Take 1 Tab by mouth every eight (8) hours as needed for Nausea. 15 Tab 0    acetaminophen (TYLENOL EXTRA STRENGTH) 500 mg tablet Take 2 Tabs by mouth every six (6) hours as needed for Pain. 30 Tab 0    ferric citrate (AURYXIA) 210 mg iron tablet Take 210 mg by mouth three (3) times daily (with meals).  carvedilol (COREG) 25 mg tablet Take 1 Tab by mouth two (2) times daily (with meals). 60 Tab 0    amLODIPine (NORVASC) 10 mg tablet Take 1 Tab by mouth daily. 30 Tab 0    apixaban (ELIQUIS) 5 mg tablet Take 5 mg by mouth two (2) times a day. Indications: pulmonary thromboembolism      polyethylene glycol (MIRALAX) 17 gram packet Take 17 g by mouth daily as needed.  predniSONE (DELTASONE) 5 mg tablet Take 2 Tabs by mouth daily. 30 Tab 0    senna (SENNA) 8.6 mg tablet Take 1 Tab by mouth daily as needed for Constipation. for constipation      amitriptyline (ELAVIL) 25 mg tablet Take 25 mg by mouth nightly.  gemfibrozil (LOPID) 600 mg tablet Take 300 mg by mouth daily.  azaTHIOprine (IMURAN) 50 mg tablet Take 50 mg by mouth daily (after breakfast).  albuterol (PROVENTIL HFA, VENTOLIN HFA, PROAIR HFA) 90 mcg/actuation inhaler Take 1-2 Puffs by inhalation every four (4) hours as needed for Wheezing or Shortness of Breath. 1 Inhaler 2    hydroxychloroquine (PLAQUENIL) 200 mg tablet Take 200 mg by mouth two (2) times a day.  pantoprazole (PROTONIX) 40 mg tablet Take 1 Tab by mouth Daily (before breakfast).  30 Tab 0       Physical Exam  Vitals:    12/17/18 1303   BP: (!) 138/98   Weight: 70.3 kg (155 lb)   Height: 5' 5\" (1.651 m)       Awake, alert, conversant  Neck: supple  Ext: dialysis fistula in right forearm    MSK:  Lumbar spine:  Minimal pain with back flexion  Mild pain with back extension     Focused Neurological Exam     Mental status:   Alert and oriented to person, place situation  Mood appears stable  Normal thought processes    CNs: EOMI, Face symmetric, Hearing/ Language normal  Sensory: intact light touch  Motor: 4/ 5 strength in arms and legs    Reflexes: not examined today  Gait: normal    Impression    ICD-10-CM ICD-9-CM    1. Chronic bilateral low back pain without sciatica M54.5 724.2 oxyCODONE-acetaminophen (PERCOCET) 5-325 mg per tablet    G89.29 338.29    2. Chronic, continuous use of opioids F11.90 305.51 oxyCODONE-acetaminophen (PERCOCET) 5-325 mg per tablet        1) Chronic bilateral lower back pain without sciatica  Discussed with patient that although pain is controlled on Percocet BID (some days TID), that I think best course for her is to gradually come off of the pain medication as she is very young and shouldn't be on it open-ended. She was agreeable to gradual reduction. Renewed Rx Percocet 5/ 325 one tab BID-TID depending on pain level but reduced # of tabs from 75/ month to 65/ month, no RF.   Will continue to reduce at follow up visits    2) follow up 4 weeks      Signed By: Anabell Toussaint MD     December 17, 2018

## 2018-12-17 NOTE — PATIENT INSTRUCTIONS
[facility]   Statement to Patients  April 1, 2014      In an effort to ensure the large volume of patient prescription refills is processed in the most efficient and expeditious manner, we are asking our patients to assist us by calling your Pharmacy for all prescription refills, this will include also your  Mail Order Pharmacy. The pharmacy will contact our office electronically to continue the refill process. Please do not wait until the last minute to call your pharmacy. We need at least 48 hours (2days) to fill prescriptions. We also encourage you to call your pharmacy before going to  your prescription to make sure it is ready. With regard to controlled substance prescription refill requests (narcotic refills) that need to be picked up at our office, we ask your cooperation by providing us with at least 72 hours (3days) notice that you will need a refill. We will not refill narcotic prescription refill requests after 4:00pm on any weekday, Monday through Thursday, or after 2:00pm on Fridays, or on the weekends. We encourage everyone to explore another way of getting your prescription refill request processed using [employer], our patient web portal through our electronic medical record system. [employer] is an efficient and effective way to communicate your medication request directly to the office and  downloadable as an judith on your smart phone . [employer] also features a review functionality that allows you to view your medication list as well as leave messages for your physician. Are you ready to get connected? If so please review the attatched instructions or speak to any of our staff to get you set up right away! Thank you so much for your cooperation. Should you have any questions please contact our Practice Administrator.     The Physicians and Staff,  [facility]

## 2019-01-09 ENCOUNTER — OFFICE VISIT (OUTPATIENT)
Dept: NEUROLOGY | Age: 34
End: 2019-01-09

## 2019-01-09 VITALS
DIASTOLIC BLOOD PRESSURE: 98 MMHG | HEIGHT: 65 IN | SYSTOLIC BLOOD PRESSURE: 138 MMHG | WEIGHT: 156 LBS | BODY MASS INDEX: 25.99 KG/M2

## 2019-01-09 DIAGNOSIS — G89.29 CHRONIC BILATERAL LOW BACK PAIN WITHOUT SCIATICA: ICD-10-CM

## 2019-01-09 DIAGNOSIS — F11.90 CHRONIC, CONTINUOUS USE OF OPIOIDS: ICD-10-CM

## 2019-01-09 DIAGNOSIS — M54.50 CHRONIC BILATERAL LOW BACK PAIN WITHOUT SCIATICA: ICD-10-CM

## 2019-01-09 RX ORDER — AMITRIPTYLINE HYDROCHLORIDE 50 MG/1
50 TABLET, FILM COATED ORAL
Qty: 30 TAB | Refills: 5 | Status: SHIPPED | OUTPATIENT
Start: 2019-01-09 | End: 2019-04-17 | Stop reason: SDUPTHER

## 2019-01-09 RX ORDER — OXYCODONE AND ACETAMINOPHEN 5; 325 MG/1; MG/1
1 TABLET ORAL
Qty: 60 TAB | Refills: 0 | Status: SHIPPED | OUTPATIENT
Start: 2019-01-09 | End: 2019-02-06 | Stop reason: SDUPTHER

## 2019-01-09 NOTE — PATIENT INSTRUCTIONS
10 [facility]   Statement to Patients  April 1, 2014      In an effort to ensure the large volume of patient prescription refills is processed in the most efficient and expeditious manner, we are asking our patients to assist us by calling your Pharmacy for all prescription refills, this will include also your  Mail Order Pharmacy. The pharmacy will contact our office electronically to continue the refill process. Please do not wait until the last minute to call your pharmacy. We need at least 48 hours (2days) to fill prescriptions. We also encourage you to call your pharmacy before going to  your prescription to make sure it is ready. With regard to controlled substance prescription refill requests (narcotic refills) that need to be picked up at our office, we ask your cooperation by providing us with at least 72 hours (3days) notice that you will need a refill. We will not refill narcotic prescription refill requests after 4:00pm on any weekday, Monday through Thursday, or after 2:00pm on Fridays, or on the weekends. We encourage everyone to explore another way of getting your prescription refill request processed using [employer], our patient web portal through our electronic medical record system. [employer] is an efficient and effective way to communicate your medication request directly to the office and  downloadable as an judith on your smart phone . [employer] also features a review functionality that allows you to view your medication list as well as leave messages for your physician. Are you ready to get connected? If so please review the attatched instructions or speak to any of our staff to get you set up right away! Thank you so much for your cooperation. Should you have any questions please contact our Practice Administrator.     The Physicians and Staff,  [facility]

## 2019-01-09 NOTE — PROGRESS NOTES
Bautista Van is a 29 y.o. female who presents with the following  Chief Complaint   Patient presents with    Follow-up       HPI  Patient of Dr. Yovani Archibald comes in for a follow up for pain management. Currently maintaining on Percocet 1 tablet BID and 1/2 tablet a few days a week PRN. She states the best pain she can get to is about a 3 and that is today. Sometimes she can get as high as an 8 when she has been more active then normal.   She states the pain is located in her low back and is midline along the spine. She has no radiation of pain down the legs. She states she is having a little discomfort in the right arm after a new graft placement for dialysis but overall this has healed nicely. Gets dialysis Tuesday Thursday Saturday at a center. Elavil 25 mg has been helping her sleep, pain. But wants to increase this. Allergies   Allergen Reactions    Compazine [Prochlorperazine Edisylate] Nausea and Vomiting and Palpitations    Compazine [Prochlorperazine] Other (comments)     Hot and lightheaded. Current Outpatient Medications   Medication Sig    amitriptyline (ELAVIL) 50 mg tablet Take 1 Tab by mouth nightly.  oxyCODONE-acetaminophen (PERCOCET) 5-325 mg per tablet Take 1 Tab by mouth two (2) times daily as needed for Pain (moderate to severe  pain). Max Daily Amount: 2 Tabs.  ondansetron (ZOFRAN ODT) 8 mg disintegrating tablet Take 1 Tab by mouth every eight (8) hours as needed for Nausea.  acetaminophen (TYLENOL EXTRA STRENGTH) 500 mg tablet Take 2 Tabs by mouth every six (6) hours as needed for Pain.  ferric citrate (AURYXIA) 210 mg iron tablet Take 210 mg by mouth three (3) times daily (with meals).  carvedilol (COREG) 25 mg tablet Take 1 Tab by mouth two (2) times daily (with meals).  amLODIPine (NORVASC) 10 mg tablet Take 1 Tab by mouth daily.  apixaban (ELIQUIS) 5 mg tablet Take 5 mg by mouth two (2) times a day.  Indications: pulmonary thromboembolism    polyethylene glycol (MIRALAX) 17 gram packet Take 17 g by mouth daily as needed.  predniSONE (DELTASONE) 5 mg tablet Take 2 Tabs by mouth daily.  senna (SENNA) 8.6 mg tablet Take 1 Tab by mouth daily as needed for Constipation. for constipation    amitriptyline (ELAVIL) 25 mg tablet Take 25 mg by mouth nightly.  gemfibrozil (LOPID) 600 mg tablet Take 300 mg by mouth daily.  azaTHIOprine (IMURAN) 50 mg tablet Take 50 mg by mouth daily (after breakfast).  albuterol (PROVENTIL HFA, VENTOLIN HFA, PROAIR HFA) 90 mcg/actuation inhaler Take 1-2 Puffs by inhalation every four (4) hours as needed for Wheezing or Shortness of Breath.  hydroxychloroquine (PLAQUENIL) 200 mg tablet Take 200 mg by mouth two (2) times a day.  pantoprazole (PROTONIX) 40 mg tablet Take 1 Tab by mouth Daily (before breakfast). No current facility-administered medications for this visit. Social History     Tobacco Use   Smoking Status Never Smoker   Smokeless Tobacco Never Used       Past Medical History:   Diagnosis Date    Anemia     secondary to lupus    Asthma     no inhaler use in past 2 to 3 years    Carditis     Chronic kidney disease     ESRD    Chronic pain     DDD (degenerative disc disease), lumbar     ESRD (end stage renal disease) (HCC)     GERD (gastroesophageal reflux disease)     Hemod[facility] ([facility] 12/21/2017    [address]  Tuesday,  Thursday,  and Saturday. 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     Poor historian 01/17/2018    With medications    Thromboembolus [facility]) 2013    lungs    Transfusion history     Last Transfusion 12/21/2017  at [facility] Past Surgical History:   Procedure Laterality Date    HX  SECTION  11/2006    x1    HX OTHER SURGICAL  9/16/15    INSERTION PD CATH; Removed 2017    HX VASCULAR ACCESS Right 2017    Double-Lumen henry catheter upper chest       Family History   Problem Relation Age of Onset    Diabetes Father     Hypertension Father     Cancer Other         aunt with breast cancer    Diabetes Mother        Social History     Socioeconomic History    Marital status: SINGLE     Spouse name: Not on file    Number of children: Not on file    Years of education: Not on file    Highest education level: Not on file   Tobacco Use    Smoking status: Never Smoker    Smokeless tobacco: Never Used   Substance and Sexual Activity    Alcohol use: No    Drug use: Yes     Types: Prescription, OTC    Sexual activity: Not Currently   Other Topics Concern     Service No    Blood Transfusions No    Caffeine Concern No    Occupational Exposure No    Hobby Hazards No    Sleep Concern No    Stress Concern No    Weight Concern No    Special Diet No    Back Care Yes     Comment: low back pain    Exercise No    Bike Helmet No    Seat Belt Yes    Self-Exams No   Social History Narrative    Lives with parents and daughter. Review of Systems   Eyes: Negative for blurred vision, double vision and photophobia. Respiratory: Negative for shortness of breath and wheezing. Cardiovascular: Negative for chest pain and palpitations. Musculoskeletal: Positive for back pain, joint pain and neck pain. Neurological: Negative for dizziness, tingling, seizures, loss of consciousness and headaches. Remainder of comprehensive review is negative.      Physical Exam :    Visit Vitals  BP (!) 138/98   Ht 5' 5\" (1.651 m)   Wt 70.8 kg (156 lb)   BMI 25.96 kg/m²       General: Well defined, nourished, and groomed individual in no acute distress.  RUE port   Psych: Good mood and bright affect     NEUROLOGICAL EXAMINATION:    Mental Status: Alert and oriented to person, place, and time     Cranial Nerves:    II, III, IV, VI: Visual acuity grossly intact. Visual fields are normal.    Pupils are equal, round, and reactive to light and accommodation.    Extra-ocular movements are full and fluid. Fundoscopic exam was benign, no ptosis or nystagmus.    V-XII: Hearing is grossly intact. Facial features are symmetric, with normal sensation and strength. The palate rises symmetrically and the tongue protrudes midline. Sternocleidomastoids 5/5.      Motor Examination: Normal tone, bulk, and strength, 4/5 muscle strength throughout.      Coordination: Finger to nose was normal.      Gait and Station: unsteady, slow gait.      Reflexes: DTRs 1+ throughout.         Results for orders placed or performed in visit on 11/19/18   DRUG SCREEN 11 W/CONF, SERUM   Result Value Ref Range    ALCOHOL(ETHYL),SERUM Negative Cutoff:0.020 gm/dL    Amphetamines Negative Cutoff:50 ng/mL    Barbiturates Negative Cutoff:0.1 ug/mL    Benzodiazepines Negative Cutoff:20 ng/mL    Cocaine and metabolites Negative Cutoff:25 ng/mL    Phencyclidine Negative Cutoff:8 ng/mL    THC (Marijuana) metabolites Negative Cutoff:5 ng/mL    Opiates Negative Cutoff:5 ng/mL    Oxycodone ++POSITIVE++ (A) Cutoff:5 ng/mL    Methadone Negative Cutoff:25 ng/mL    Propoxyphene Negative Cutoff:50 ng/mL   OXYCODONES,MS,WB/SP RFX   Result Value Ref Range    Oxycodone Positive     Oxycocone 42.3 ng/mL    Oxymorphone Negative ng/mL     reviewed and no discrepancies. Last Percocet filled 12/17/2018     Orders Placed This Encounter    amitriptyline (ELAVIL) 50 mg tablet     Sig: Take 1 Tab by mouth nightly. Dispense:  30 Tab     Refill:  5    oxyCODONE-acetaminophen (PERCOCET) 5-325 mg per tablet     Sig: Take 1 Tab by mouth two (2) times daily as needed for Pain (moderate to severe  pain). Max Daily Amount: 2 Tabs.      Dispense:  60 Tab     Refill:  0 1. Chronic bilateral low back pain without sciatica    2. Chronic, continuous use of opioids        Follow-up Disposition: Not on File      Chronic pain. We discussed her Elavil in full and she has no questions. We decided to increase to 50 mg nightly to help with continued sleep, pain management as we continue to wean down off the Percocet. Per Dr. Busch Hand note continue to wean. We will drop from 65 to 60 Percocet a month as to be taken BID PRN for breakthrough pain. Hopefully the Elavil will help maintain and balance the pain out. Otherwise she is doing well.        This [facility]

## 2019-01-09 NOTE — PROGRESS NOTES
Patient is here for follow up. No concerns. States she has been doing well but wanted to know if she could have a refill of amitriptyline.

## 2019-02-06 ENCOUNTER — OFFICE VISIT (OUTPATIENT)
Dept: NEUROLOGY | Age: 34
End: 2019-02-06

## 2019-02-06 VITALS
HEIGHT: 65 IN | DIASTOLIC BLOOD PRESSURE: 78 MMHG | OXYGEN SATURATION: 95 % | HEART RATE: 95 BPM | SYSTOLIC BLOOD PRESSURE: 106 MMHG | BODY MASS INDEX: 25.96 KG/M2 | RESPIRATION RATE: 20 BRPM

## 2019-02-06 DIAGNOSIS — F11.90 CHRONIC, CONTINUOUS USE OF OPIOIDS: ICD-10-CM

## 2019-02-06 DIAGNOSIS — M54.50 CHRONIC BILATERAL LOW BACK PAIN WITHOUT SCIATICA: ICD-10-CM

## 2019-02-06 DIAGNOSIS — G89.29 CHRONIC BILATERAL LOW BACK PAIN WITHOUT SCIATICA: ICD-10-CM

## 2019-02-06 RX ORDER — OXYCODONE AND ACETAMINOPHEN 5; 325 MG/1; MG/1
1 TABLET ORAL
Qty: 55 TAB | Refills: 0 | Status: SHIPPED | OUTPATIENT
Start: 2019-02-06 | End: 2019-03-06

## 2019-02-06 NOTE — PATIENT INSTRUCTIONS
We discussed reducing your Percocet to 1 tablet in morning, 1 tablet tylenol in the afternoon, and 1 tablet of Percocet in the evening. Remember to look up Yoga Exercises for Low back pain and Hip pain on YouTube. Do those exercises twice a day to help reduce your back pains    See you in 4 weeks.

## 2019-02-06 NOTE — PROGRESS NOTES
Interval HPI:     This is a 29 y.o. female who is following up for     PMHx: chronic back pain, hx of fibromyalgia, hx of DVT/ coumadin, hx of Lupus, mild lower lumbar disc degeneration (L4-5 and L5-S1 with small annular tear at L5-S1)    Chief Complaint   Patient presents with    Follow-up     pain management        No significant change in moderate lower back pain   With pain medication, rates average daily pain as 2-5/ 10  Occasionally pain gets up to 6-7/ 10  Taking Percocet 5/325 one tab BID  Took last percocet tablet last PM    Tried/ failed: Gabapentin, Cymbalta, Amitriptyline (only helped get to sleep), Lyrica (abnormal behaviors), Hydrocodone (\"too strong\")    Reviewed : no abberant behaviors  Pill count: 0 percocet tablet left  Last filled on 1-9-19 (#60 tabs)     UDS Hx:  March 2017: consistent with Rx pain medication  9-13-17 UDS: only screened for codeine or morphine, which pt doesn't take  1-3-18: serum Drug screen: consistent with Rx pain medication (oxycodone)  11-19-18: serum drug screen; consistent with Rx pain medication (oxycodone)      Brief ROS: as above      Past Medical History:   Diagnosis Date    Anemia     secondary to lupus    Asthma     no inhaler use in past 2 to 3 years    Carditis     Chronic kidney disease     ESRD    Chronic pain     DDD (degenerative disc disease), lumbar     [facility]D [facility] stage [facility] disease) ([facility]     GERD (gastroesophageal reflux disease)     Hemodialysis patient ([facility] 12/21/2017    [address]  Tuesday,  Thursday,  and Saturday. 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     Poor historian 01/17/2018    With medications    Thromboembolus [facility]) 2013    lungs    Transfusion history     Last Transfusion 2017  at [facility]       Past Surgical History:   Procedure Laterality Date    HX  SECTION  11/2006    x1    HX OTHER SURGICAL  9/16/15    INSERTION PD CATH; Removed 2017    HX VASCULAR ACCESS Right 2017    Double-Lumen henry catheter upper chest       Family History   Problem Relation Age of Onset    Diabetes Father     Hypertension Father     Cancer Other         aunt with breast cancer    Diabetes Mother        Social History     Socioeconomic History    Marital status: SINGLE     Spouse name: Not on file    Number of children: Not on file    Years of education: Not on file    Highest education level: Not on file   Social Needs    Financial resource strain: Not on file    Food insecurity - worry: Not on file    Food insecurity - inability: Not on file   [employer] needs - medical: Not on file   [employer] needs - non-medical: Not on file   Occupational History    Not on file   Tobacco Use    Smoking status: Never Smoker    Smokeless tobacco: Never Used   Substance and Sexual Activity    Alcohol use: No    Drug use: Yes     Types: Prescription, OTC    Sexual activity: Not Currently   Other Topics Concern     Service No    Blood Transfusions No    Caffeine Concern No    Occupational Exposure No    Hobby Hazards No    Sleep Concern No    Stress Concern No    Weight Concern No    Special Diet No    Back Care Yes     Comment: low back pain    Exercise No    Bike Helmet No    Seat Belt Yes    Self-Exams No   Social History Narrative    Lives with parents and daughter. Allergies   Allergen Reactions    Compazine [Prochlorperazine Edisylate] Nausea and Vomiting and Palpitations    Compazine [Prochlorperazine] Other (comments)     Hot and lightheaded.      Current Outpatient Medications   Medication Sig Dispense Refill    oxyCODONE-acetaminophen (PERCOCET) 5-325 mg per tablet Take 1 Tab by mouth two (2) times daily as needed for Pain (moderate to severe  pain). Max Daily Amount: 2 Tabs. 55 Tab 0    amitriptyline (ELAVIL) 50 mg tablet Take 1 Tab by mouth nightly. 30 Tab 5    ondansetron (ZOFRAN ODT) 8 mg disintegrating tablet Take 1 Tab by mouth every eight (8) hours as needed for Nausea. 15 Tab 0    acetaminophen (TYLENOL EXTRA STRENGTH) 500 mg tablet Take 2 Tabs by mouth every six (6) hours as needed for Pain. 30 Tab 0    ferric citrate (AURYXIA) 210 mg iron tablet Take 210 mg by mouth three (3) times daily (with meals).  carvedilol (COREG) 25 mg tablet Take 1 Tab by mouth two (2) times daily (with meals). 60 Tab 0    amLODIPine (NORVASC) 10 mg tablet Take 1 Tab by mouth daily. 30 Tab 0    apixaban (ELIQUIS) 5 mg tablet Take 5 mg by mouth two (2) times a day. Indications: pulmonary thromboembolism      polyethylene glycol (MIRALAX) 17 gram packet Take 17 g by mouth daily as needed.  predniSONE (DELTASONE) 5 mg tablet Take 2 Tabs by mouth daily. 30 Tab 0    senna (SENNA) 8.6 mg tablet Take 1 Tab by mouth daily as needed for Constipation. for constipation      amitriptyline (ELAVIL) 25 mg tablet Take 25 mg by mouth nightly.  gemfibrozil (LOPID) 600 mg tablet Take 300 mg by mouth daily.  azaTHIOprine (IMURAN) 50 mg tablet Take 50 mg by mouth daily (after breakfast).  albuterol (PROVENTIL HFA, VENTOLIN HFA, PROAIR HFA) 90 mcg/actuation inhaler Take 1-2 Puffs by inhalation every four (4) hours as needed for Wheezing or Shortness of Breath. 1 Inhaler 2    hydroxychloroquine (PLAQUENIL) 200 mg tablet Take 200 mg by mouth two (2) times a day.  pantoprazole (PROTONIX) 40 mg tablet Take 1 Tab by mouth Daily (before breakfast).  30 Tab 0       Physical Exam  Vitals:    02/06/19 1302   BP: 106/78   Pulse: 95   Resp: 20   SpO2: 95%   Height: 5' 5\" (1.651 m)       Awake, alert, conversant  Neck: supple  Ext: dialysis fistula in right forearm    MSK:  Lumbar spine:  Minimal pain with back flexion  Mild pain with back extension     Focused Neurological Exam     Mental status:   Alert and oriented to person, place situation  Mood appears stable  Normal thought processes    CNs: EOMI, Face symmetric, Hearing/ Language normal  Sensory: intact light touch  Motor: 4/ 5 strength in arms and legs    Reflexes: not examined today  Gait: normal    Impression    ICD-10-CM ICD-9-CM    1. Chronic bilateral low back pain without sciatica M54.5 724.2 oxyCODONE-acetaminophen (PERCOCET) 5-325 mg per tablet    G89.29 338.29    2. Chronic, continuous use of opioids F11.90 305.51 oxyCODONE-acetaminophen (PERCOCET) 5-325 mg per tablet        1) Chronic bilateral lower back pain without sciatica  Advised pt to reduce Percocet to one tab AM, one tab Tylenol around Noon, and one tablet of Percocet in evening, as needed for severe back pain. Also advised her to do yoga stretching for low back pain/ hip pains as that can significantly reduce back pain. Reduced # of Percocet tabs to #55 for this month. Will gradually keep lowering # of tablets. Follow up in 4 weeks.      Signed By: Shawn Crespo MD     February 6, 2019

## 2019-02-19 ENCOUNTER — APPOINTMENT (OUTPATIENT)
Dept: CT IMAGING | Age: 34
End: 2019-02-19
Attending: EMERGENCY MEDICINE
Payer: MEDICARE

## 2019-02-19 ENCOUNTER — APPOINTMENT (OUTPATIENT)
Dept: GENERAL RADIOLOGY | Age: 34
End: 2019-02-19
Attending: EMERGENCY MEDICINE
Payer: MEDICARE

## 2019-02-19 ENCOUNTER — HOSPITAL ENCOUNTER (EMERGENCY)
Age: 34
Discharge: HOME | End: 2019-02-19
Attending: EMERGENCY MEDICINE | Admitting: EMERGENCY MEDICINE
Payer: MEDICARE

## 2019-02-19 VITALS
HEART RATE: 100 BPM | TEMPERATURE: 98 F | HEIGHT: 65 IN | RESPIRATION RATE: 22 BRPM | SYSTOLIC BLOOD PRESSURE: 133 MMHG | DIASTOLIC BLOOD PRESSURE: 96 MMHG | OXYGEN SATURATION: 96 % | WEIGHT: 167.77 LBS | BODY MASS INDEX: 27.95 KG/M2

## 2019-02-19 DIAGNOSIS — B34.9 VIRAL SYNDROME: Primary | ICD-10-CM

## 2019-02-19 DIAGNOSIS — E87.5 ACUTE HYPERKALEMIA: ICD-10-CM

## 2019-02-19 DIAGNOSIS — M79.10 MYALGIA: ICD-10-CM

## 2019-02-19 DIAGNOSIS — R51.9 ACUTE INTRACTABLE HEADACHE, UNSPECIFIED HEADACHE TYPE: ICD-10-CM

## 2019-02-19 LAB
ANION GAP SERPL CALC-SCNC: 12 MMOL/L (ref 5–15)
BASOPHILS # BLD: 0 K/UL (ref 0–0.1)
BASOPHILS NFR BLD: 0 % (ref 0–1)
BUN SERPL-MCNC: 44 MG/DL (ref 6–20)
BUN/CREAT SERPL: 6 (ref 12–20)
CALCIUM SERPL-MCNC: 8.2 MG/DL (ref 8.5–10.1)
CHLORIDE SERPL-SCNC: 92 MMOL/L (ref 97–108)
CO2 SERPL-SCNC: 32 MMOL/L (ref 21–32)
COMMENT, HOLDF: NORMAL
CREAT SERPL-MCNC: 6.9 MG/DL (ref 0.55–1.02)
DIFFERENTIAL METHOD BLD: ABNORMAL
EOSINOPHIL # BLD: 0 K/UL (ref 0–0.4)
EOSINOPHIL NFR BLD: 1 % (ref 0–7)
ERYTHROCYTE [DISTWIDTH] IN BLOOD BY AUTOMATED COUNT: 15.9 % (ref 11.5–14.5)
FLUAV AG NPH QL IA: NEGATIVE
FLUBV AG NOSE QL IA: NEGATIVE
GLUCOSE SERPL-MCNC: 87 MG/DL (ref 65–100)
HCT VFR BLD AUTO: 48.4 % (ref 35–47)
HGB BLD-MCNC: 15.4 G/DL (ref 11.5–16)
IMM GRANULOCYTES # BLD AUTO: 0 K/UL (ref 0–0.04)
IMM GRANULOCYTES NFR BLD AUTO: 0 % (ref 0–0.5)
LYMPHOCYTES # BLD: 0.5 K/UL (ref 0.8–3.5)
LYMPHOCYTES NFR BLD: 13 % (ref 12–49)
MCH RBC QN AUTO: 27.7 PG (ref 26–34)
MCHC RBC AUTO-ENTMCNC: 31.8 G/DL (ref 30–36.5)
MCV RBC AUTO: 87.1 FL (ref 80–99)
MONOCYTES # BLD: 0.3 K/UL (ref 0–1)
MONOCYTES NFR BLD: 8 % (ref 5–13)
NEUTS SEG # BLD: 3 K/UL (ref 1.8–8)
NEUTS SEG NFR BLD: 78 % (ref 32–75)
NRBC # BLD: 0 K/UL (ref 0–0.01)
NRBC BLD-RTO: 0 PER 100 WBC
PLATELET # BLD AUTO: 212 K/UL (ref 150–400)
PMV BLD AUTO: 10.1 FL (ref 8.9–12.9)
POTASSIUM SERPL-SCNC: 5.9 MMOL/L (ref 3.5–5.1)
RBC # BLD AUTO: 5.56 M/UL (ref 3.8–5.2)
RBC MORPH BLD: ABNORMAL
SAMPLES BEING HELD,HOLD: NORMAL
SODIUM SERPL-SCNC: 136 MMOL/L (ref 136–145)
WBC # BLD AUTO: 3.8 K/UL (ref 3.6–11)

## 2019-02-19 PROCEDURE — 80048 BASIC METABOLIC PNL TOTAL CA: CPT

## 2019-02-19 PROCEDURE — 87804 INFLUENZA ASSAY W/OPTIC: CPT

## 2019-02-19 PROCEDURE — 36415 COLL VENOUS BLD VENIPUNCTURE: CPT

## 2019-02-19 PROCEDURE — 71046 X-RAY EXAM CHEST 2 VIEWS: CPT

## 2019-02-19 PROCEDURE — 85025 COMPLETE CBC W/AUTO DIFF WBC: CPT

## 2019-02-19 PROCEDURE — 74011250637 HC RX REV CODE- 250/637: Performed by: EMERGENCY MEDICINE

## 2019-02-19 PROCEDURE — 99285 EMERGENCY DEPT VISIT HI MDM: CPT

## 2019-02-19 PROCEDURE — 93005 ELECTROCARDIOGRAM TRACING: CPT

## 2019-02-19 PROCEDURE — 70450 CT HEAD/BRAIN W/O DYE: CPT

## 2019-02-19 RX ORDER — SODIUM POLYSTYRENE SULFONATE 15 G/60ML
30 SUSPENSION ORAL; RECTAL
Status: COMPLETED | OUTPATIENT
Start: 2019-02-19 | End: 2019-02-19

## 2019-02-19 RX ORDER — OXYCODONE AND ACETAMINOPHEN 5; 325 MG/1; MG/1
3 TABLET ORAL
Status: COMPLETED | OUTPATIENT
Start: 2019-02-19 | End: 2019-02-19

## 2019-02-19 RX ORDER — IBUPROFEN 600 MG/1
600 TABLET ORAL
Status: COMPLETED | OUTPATIENT
Start: 2019-02-19 | End: 2019-02-19

## 2019-02-19 RX ORDER — CLONIDINE HYDROCHLORIDE 0.1 MG/1
0.1 TABLET ORAL
Status: COMPLETED | OUTPATIENT
Start: 2019-02-19 | End: 2019-02-19

## 2019-02-19 RX ADMIN — IBUPROFEN 600 MG: 600 TABLET, FILM COATED ORAL at 16:43

## 2019-02-19 RX ADMIN — OXYCODONE HYDROCHLORIDE AND ACETAMINOPHEN 3 TABLET: 5; 325 TABLET ORAL at 16:44

## 2019-02-19 RX ADMIN — CLONIDINE HYDROCHLORIDE 0.1 MG: 0.1 TABLET ORAL at 17:52

## 2019-02-19 RX ADMIN — SODIUM POLYSTYRENE SULFONATE 30 G: 15 SUSPENSION ORAL; RECTAL at 17:50

## 2019-02-19 NOTE — ED PROVIDER NOTES
This patient presents with headache and myalgias since yesterday. Some rhinorrhea. No sore throat. No cough or hemoptysis. She had 5 L taken off at dialysis today. No fever or chills. Appetite is \"perfect. \" No stiff neck. She did have a flu shot. She has lupus and gets dialysis 3 times a week. No vomiting or diarrhea. She only urinates a small amount once per day. No burning or painful urination. She tried one of her Percocet that she takes for her chronic pain around 4.5 hours ago and that helped a little. Mom is here and says her head hurts frequently bc of high BP. Old chart reviewed - last ED visit was in 2018 for gastritis. Past Medical History:   Diagnosis Date    Anemia     secondary to lupus    Asthma     no inhaler use in past 2 to 3 years    Carditis     Chronic kidney disease     ESRD    Chronic pain     DDD (degenerative disc disease), lumbar     ESRD (end stage renal disease) (HCC)     GERD (gastroesophageal reflux disease)     Hemod[facility] ([facility] 2017    [address]  Tuesday,  Thursday,  and Saturday.  Hypercholesterolemia     Hypertension     Intractable nausea and vomiting 10/21/2015    Long term (current) use of anticoagulants     Lupus (systemic lupus erythematosus) (HCC)     Malignant hypertension with chronic kidney disease stage V ([address]     Peritoneal dialysis status ([facility] 10/2015    x 2 years Stopped 2017 due to infection and removed.  Poor historian 2018    With medications    Thromboembolus ([facility] 2013    lungs    Transfusion history     [facility] Trans[facility] 2017  [facility]       Past Surgical History:   Procedure Laterality Date    HX  SECTION  11/2006    x1    HX OTHER SURGICAL  9/16/15    INSERTION PD CATH;  Removed 2017    HX VASCULAR ACCESS Right 2017    Double-Lumen henry catheter upper chest         Family History:   Problem Relation Age of Onset    Diabetes Father     Hypertension Father     Cancer Other         aunt with breast cancer    Diabetes Mother        Social History     Socioeconomic History    Marital status: SINGLE     Spouse name: Not on file    Number of children: Not on file    Years of education: Not on file    Highest education level: Not on file   Social Needs    Financial resource strain: Not on file    Food insecurity - worry: Not on file    Food insecurity - inability: Not on file   [employer] needs - medical: Not on file   [employer] needs - non-medical: Not on file   Occupational History    Not on file   Tobacco Use    Smoking status: Never Smoker    Smokeless tobacco: Never Used   Substance and Sexual Activity    Alcohol use: No    Drug use: Yes     Types: Prescription, OTC    Sexual activity: Not Currently   Other Topics Concern     Service No    Blood Transfusions No    Caffeine Concern No    Occupational Exposure No    Hobby Hazards No    Sleep Concern No    Stress Concern No    Weight Concern No    Special Diet No    Back Care Yes     Comment: low back pain    Exercise No    Bike Helmet No    Seat Belt Yes    Self-Exams No   Social History Narrative    Lives with parents and daughter. ALLERGIES: Compazine [prochlorperazine edisylate] and Compazine [prochlorperazine]    Review of Systems   All other systems reviewed and are negative. Vitals:    02/19/19 1601   BP: (!) 145/116   Pulse: 100   Resp: 20   Temp: 98.5 °F (36.9 °C)   SpO2: 93%   Weight: 76.1 kg (167 lb 12.3 oz)   Height: 5' 5\" (1.651 m)            Physical Exam   Constitutional: She appears well-developed and well-nourished. No distress. HENT:   Head: Normocephalic. Mouth/Throat: Oropharynx is clear and moist.   Eyes: Conjunctivae are normal. Pupils are equal, round, and reactive to light. Neck: Normal range of motion. Neck supple. No tracheal deviation present.    Can touch chin to chest   Cardiovascular: Normal rate, normal heart sounds and intact distal pulses. Pulmonary/Chest: Effort normal. She has rales (bibasilar). Abdominal: Soft. She exhibits no mass. There is no tenderness. There is no guarding. Musculoskeletal: She exhibits no edema. Arms:  Neurological: She is alert. Skin: Skin is warm and dry. No rash noted. She is not diaphoretic. Psychiatric: She has a normal mood and affect. MDM       Procedures    ED EKG interpretation:  Rhythm: normal sinus rhythm; and regular . Rate (approx.): 94; Axis: normal; P wave: normal; QRS interval: normal ; ST/T wave: normal; This EKG was interpreted by Gal Valdes DO,ED Provider. 6:01 PM - HA 6/10. Mom is here. She says she gets headaches frequently when her BP is high. Not due to for night time meds yet. Sometimes gets clonidine at HD when BP up. Is on eliquis. Will add head CT. Discussed with Dr Timur Berrios. He knows her well. He agrees with kayexalate. She told me K was high earlier before HD. He will call center tomorrow to see about checking her K tomorrow. Labs Reviewed   CBC WITH AUTOMATED DIFF - Abnormal; Notable for the following components:       Result Value    RBC 5.56 (*)     HCT 48.4 (*)     RDW 15.9 (*)     NEUTROPHILS 78 (*)     ABS.  LYMPHOCYTES 0.5 (*)     All other components within normal limits   METABOLIC PANEL, BASIC - Abnormal; Notable for the following components:    Potassium 5.9 (*)     Chloride 92 (*)     BUN 44 (*)     Creatinine 6.90 (*)     BUN/Creatinine ratio 6 (*)     GFR est AA 8 (*)     GFR est non-AA 7 (*)     Calcium 8.2 (*)     All other components within normal limits   INFLUENZA A & B AG (RAPID TEST)   SAMPLES BEING HELD

## 2019-02-19 NOTE — DISCHARGE INSTRUCTIONS
Patient Education        Viral Infections: Care Instructions  Your Care Instructions    You don't feel well, but it's not clear what's causing it. You may have a viral infection. Viruses cause many illnesses, such as the common cold, influenza, fever, rashes, and the diarrhea, nausea, and vomiting that are often called \"stomach flu. \" You may wonder if antibiotic medicines could make you feel better. But antibiotics only treat infections caused by bacteria. They don't work on viruses. The good news is that viral infections usually aren't serious. Most will go away in a few days without medical treatment. In the meantime, there are a few things you can do to make yourself more comfortable. Follow-up care is a key part of your treatment and safety. Be sure to make and go to all appointments, and call your doctor if you are having problems. It's also a good idea to know your test results and keep a list of the medicines you take. How can you care for yourself at home? · Get plenty of rest if you feel tired. · Take an over-the-counter pain medicine if needed, such as acetaminophen (Tylenol), ibuprofen (Advil, Motrin), or naproxen (Aleve). Read and follow all instructions on the label. · Be careful when taking over-the-counter cold or flu medicines and Tylenol at the same time. Many of these medicines have acetaminophen, which is Tylenol. Read the labels to make sure that you are not taking more than the recommended dose. Too much acetaminophen (Tylenol) can be harmful. · Drink plenty of fluids, enough so that your urine is light yellow or clear like water. If you have kidney, heart, or liver disease and have to limit fluids, talk with your doctor before you increase the amount of fluids you drink. · Stay home from work, school, and other public places while you have a fever. When should you call for help? Call 911 anytime you think you may need emergency care.  For example, call if:    · You have severe trouble breathing.     · You passed out (lost consciousness).    Call your doctor now or seek immediate medical care if:    · You seem to be getting much sicker.     · You have a new or higher fever.     · You have blood in your stools.     · You have new belly pain, or your pain gets worse.     · You have a new rash.    Watch closely for changes in your health, and be sure to contact your doctor if:    · You start to get better and then get worse.     · You do not get better as expected. Where can you learn more? Go to http://jorge-rochelle.info/. Enter F158 in the search box to learn more about \"Viral Infections: Care Instructions. \"  Current as of: July 30, 2018  Content Version: 11.9  © 6453-9458 [employer]. Care instructions adapted under license by [employer] (which disclaims liability or warranty for this information). If you have questions about a medical condition or this instruction, always ask your healthcare professional. [place] [address] any warranty or liability for your use of this information. Patient Education        Hyperkalemia: Care Instructions  Your Care Instructions    Hyperkalemia is too much potassium in the blood. Potassium helps keep the right mix of fluids in your body. It also helps your nerves and muscles work as they should. And it keeps your heartbeat in a normal rhythm. Some things can raise potassium levels. These include some health problems, medicines, and kidney problems. (Normally, your kidneys remove extra potassium.)  Too much potassium can cause nausea. It also can cause a heartbeat that isn't normal. But you may not have any symptoms. Too much potassium can be dangerous. That's why it's important to treat it. If you are taking any of the medicines that can raise your levels, your doctor will ask you to stop. You may get medicines to lower your levels.  And you may have to limit or not eat foods that have a lot of potassium. Follow-up care is a key part of your treatment and safety. Be sure to make and go to all appointments, and call your doctor if you are having problems. It's also a good idea to know your test results and keep a list of the medicines you take. How can you care for yourself at home? · Take your medicines exactly as prescribed. Call your doctor if you think you are having a problem with your medicine. · Stop taking certain medicines if your doctor asks you to. They may be causing your high potassium levels. If you have concerns about stopping medicine, talk with your doctor. · If you have kidney, heart, or liver disease and have to limit fluids, talk with your doctor before you increase the amount of fluids you drink. If the doctor says it's okay, drink plenty of fluids. This means drinking enough so that your urine is light yellow or clear like water. · Avoid strenuous exercise until your doctor tells you it is okay. · Potassium is in many foods, including vegetables, fruits, and milk products. Foods high in potassium include bananas, cantaloupe, broccoli, milk, potatoes, and tomatoes. · Low potassium foods include blueberries, raspberries, cucumber, white or brown rice, spaghetti, and macaroni. · Do not use a salt substitute without talking to your doctor first. Most of these are very high in potassium. · Be sure to tell your doctor about any prescription, over-the-counter, or herbal medicines you take. Some of these can raise potassium. When should you call for help? Call 911 anytime you think you may need emergency care. For example, call if:    · You passed out (lost consciousness).     · You have an unusual heartbeat. Your heart may beat fast or skip beats.    Call your doctor now or seek immediate medical care if:    · You have muscle aches.     · You feel very weak.    Watch closely for changes in your health, and be sure to contact your doctor if:    · You do not get better as expected. Where can you learn more? Go to http://jorge-rochelle.info/. Enter X456 in the search box to learn more about \"Hyperkalemia: Care Instructions. \"  Current as of: March 14, 2018  Content Version: 11.9  © 0523-7519 [employer]. Care instructions adapted under license by [employer] (which disclaims liability or warranty for this information). If you have questions about a medical condition or this instruction, always ask your healthcare professional. [address] any warranty or liability for your use of this information.

## 2019-02-19 NOTE — ED TRIAGE NOTES
Triage: pt c/o body aches, nasal drainage, headache starting while receiving dialysis today. 5.5L taken off which is pt's normal. Denies fever, N/V/D, SOB, chest pain, cough.

## 2019-02-20 ENCOUNTER — PATIENT OUTREACH (OUTPATIENT)
Dept: FAMILY MEDICINE CLINIC | Age: 34
End: 2019-02-20

## 2019-02-20 LAB
ATRIAL RATE: 94 BPM
CALCULATED P AXIS, ECG09: 45 DEGREES
CALCULATED R AXIS, ECG10: 38 DEGREES
CALCULATED T AXIS, ECG11: 45 DEGREES
DIAGNOSIS, 93000: NORMAL
P-R INTERVAL, ECG05: 138 MS
Q-T INTERVAL, ECG07: 400 MS
QRS DURATION, ECG06: 80 MS
QTC CALCULATION (BEZET), ECG08: 500 MS
VENTRICULAR RATE, ECG03: 94 BPM

## 2019-02-21 NOTE — PROGRESS NOTES
Patient seen at [facility] on 2/19 for viral syndrome, myalgia, headache and hyperkalemia following her dialysis. Outreach to her today to complete a follow-up assessment. Patient states she is feeling much better, no longer has headache. Verbalized no changes with care plan. Medication reconciliation completed. Patient decline PCP follow up at this time. Given NN contact information for questions and concerns.

## 2019-03-04 ENCOUNTER — APPOINTMENT (OUTPATIENT)
Dept: CT IMAGING | Age: 34
End: 2019-03-04
Attending: EMERGENCY MEDICINE
Payer: MEDICARE

## 2019-03-04 ENCOUNTER — HOSPITAL ENCOUNTER (OUTPATIENT)
Age: 34
Setting detail: OBSERVATION
Discharge: HOME | End: 2019-03-06
Attending: EMERGENCY MEDICINE | Admitting: FAMILY MEDICINE
Payer: MEDICARE

## 2019-03-04 DIAGNOSIS — R10.32 ABDOMINAL PAIN, LLQ (LEFT LOWER QUADRANT): ICD-10-CM

## 2019-03-04 DIAGNOSIS — N83.202 CYST OF LEFT OVARY: ICD-10-CM

## 2019-03-04 DIAGNOSIS — E87.5 ACUTE HYPERKALEMIA: Primary | ICD-10-CM

## 2019-03-04 LAB
ALBUMIN SERPL-MCNC: 3.5 G/DL (ref 3.5–5)
ALBUMIN/GLOB SERPL: 0.7 {RATIO} (ref 1.1–2.2)
ALP SERPL-CCNC: 96 U/L (ref 45–117)
ALT SERPL-CCNC: 12 U/L (ref 12–78)
ANION GAP SERPL CALC-SCNC: 12 MMOL/L (ref 5–15)
AST SERPL-CCNC: 32 U/L (ref 15–37)
BASOPHILS # BLD: 0 K/UL (ref 0–0.1)
BASOPHILS NFR BLD: 0 % (ref 0–1)
BILIRUB SERPL-MCNC: 0.4 MG/DL (ref 0.2–1)
BUN SERPL-MCNC: 77 MG/DL (ref 6–20)
BUN/CREAT SERPL: 8 (ref 12–20)
CALCIUM SERPL-MCNC: 8.3 MG/DL (ref 8.5–10.1)
CHLORIDE SERPL-SCNC: 98 MMOL/L (ref 97–108)
CO2 SERPL-SCNC: 28 MMOL/L (ref 21–32)
COMMENT, HOLDF: NORMAL
CREAT SERPL-MCNC: 9.77 MG/DL (ref 0.55–1.02)
DIFFERENTIAL METHOD BLD: ABNORMAL
EOSINOPHIL # BLD: 0.1 K/UL (ref 0–0.4)
EOSINOPHIL NFR BLD: 2 % (ref 0–7)
ERYTHROCYTE [DISTWIDTH] IN BLOOD BY AUTOMATED COUNT: 15 % (ref 11.5–14.5)
GLOBULIN SER CALC-MCNC: 4.8 G/DL (ref 2–4)
GLUCOSE SERPL-MCNC: 84 MG/DL (ref 65–100)
HCT VFR BLD AUTO: 38.7 % (ref 35–47)
HGB BLD-MCNC: 12.1 G/DL (ref 11.5–16)
IMM GRANULOCYTES # BLD AUTO: 0 K/UL (ref 0–0.04)
IMM GRANULOCYTES NFR BLD AUTO: 0 % (ref 0–0.5)
LIPASE SERPL-CCNC: 174 U/L (ref 73–393)
LYMPHOCYTES # BLD: 0.7 K/UL (ref 0.8–3.5)
LYMPHOCYTES NFR BLD: 24 % (ref 12–49)
MAGNESIUM SERPL-MCNC: 2.2 MG/DL (ref 1.6–2.4)
MCH RBC QN AUTO: 27.8 PG (ref 26–34)
MCHC RBC AUTO-ENTMCNC: 31.3 G/DL (ref 30–36.5)
MCV RBC AUTO: 89 FL (ref 80–99)
MONOCYTES # BLD: 0.3 K/UL (ref 0–1)
MONOCYTES NFR BLD: 9 % (ref 5–13)
NEUTS SEG # BLD: 1.9 K/UL (ref 1.8–8)
NEUTS SEG NFR BLD: 65 % (ref 32–75)
NRBC # BLD: 0 K/UL (ref 0–0.01)
NRBC BLD-RTO: 0 PER 100 WBC
PLATELET # BLD AUTO: 148 K/UL (ref 150–400)
PMV BLD AUTO: 11.4 FL (ref 8.9–12.9)
POTASSIUM SERPL-SCNC: 6.6 MMOL/L (ref 3.5–5.1)
PROT SERPL-MCNC: 8.3 G/DL (ref 6.4–8.2)
RBC # BLD AUTO: 4.35 M/UL (ref 3.8–5.2)
RBC MORPH BLD: ABNORMAL
SAMPLES BEING HELD,HOLD: NORMAL
SODIUM SERPL-SCNC: 138 MMOL/L (ref 136–145)
WBC # BLD AUTO: 3 K/UL (ref 3.6–11)

## 2019-03-04 PROCEDURE — 96376 TX/PRO/DX INJ SAME DRUG ADON: CPT

## 2019-03-04 PROCEDURE — 83690 ASSAY OF LIPASE: CPT

## 2019-03-04 PROCEDURE — 36415 COLL VENOUS BLD VENIPUNCTURE: CPT

## 2019-03-04 PROCEDURE — 74011250636 HC RX REV CODE- 250/636: Performed by: EMERGENCY MEDICINE

## 2019-03-04 PROCEDURE — 96375 TX/PRO/DX INJ NEW DRUG ADDON: CPT

## 2019-03-04 PROCEDURE — 99218 HC RM OBSERVATION: CPT

## 2019-03-04 PROCEDURE — 85025 COMPLETE CBC W/AUTO DIFF WBC: CPT

## 2019-03-04 PROCEDURE — 80053 COMPREHEN METABOLIC PANEL: CPT

## 2019-03-04 PROCEDURE — 74011250636 HC RX REV CODE- 250/636: Performed by: FAMILY MEDICINE

## 2019-03-04 PROCEDURE — 90935 HEMODIALYSIS ONE EVALUATION: CPT

## 2019-03-04 PROCEDURE — 74177 CT ABD & PELVIS W/CONTRAST: CPT

## 2019-03-04 PROCEDURE — 74011636320 HC RX REV CODE- 636/320: Performed by: EMERGENCY MEDICINE

## 2019-03-04 PROCEDURE — 99285 EMERGENCY DEPT VISIT HI MDM: CPT

## 2019-03-04 PROCEDURE — 87340 HEPATITIS B SURFACE AG IA: CPT

## 2019-03-04 PROCEDURE — 96374 THER/PROPH/DIAG INJ IV PUSH: CPT

## 2019-03-04 PROCEDURE — 74011250637 HC RX REV CODE- 250/637: Performed by: EMERGENCY MEDICINE

## 2019-03-04 PROCEDURE — 83735 ASSAY OF MAGNESIUM: CPT

## 2019-03-04 PROCEDURE — 94761 N-INVAS EAR/PLS OXIMETRY MLT: CPT

## 2019-03-04 PROCEDURE — 93005 ELECTROCARDIOGRAM TRACING: CPT

## 2019-03-04 RX ORDER — HYDROMORPHONE HYDROCHLORIDE 1 MG/ML
1 INJECTION, SOLUTION INTRAMUSCULAR; INTRAVENOUS; SUBCUTANEOUS ONCE
Status: COMPLETED | OUTPATIENT
Start: 2019-03-04 | End: 2019-03-04

## 2019-03-04 RX ORDER — SODIUM CHLORIDE 0.9 % (FLUSH) 0.9 %
5-40 SYRINGE (ML) INJECTION EVERY 8 HOURS
Status: DISCONTINUED | OUTPATIENT
Start: 2019-03-04 | End: 2019-03-06 | Stop reason: HOSPADM

## 2019-03-04 RX ORDER — SODIUM CHLORIDE 0.9 % (FLUSH) 0.9 %
5-40 SYRINGE (ML) INJECTION AS NEEDED
Status: DISCONTINUED | OUTPATIENT
Start: 2019-03-04 | End: 2019-03-06 | Stop reason: HOSPADM

## 2019-03-04 RX ORDER — MORPHINE SULFATE 2 MG/ML
4 INJECTION, SOLUTION INTRAMUSCULAR; INTRAVENOUS
Status: COMPLETED | OUTPATIENT
Start: 2019-03-04 | End: 2019-03-04

## 2019-03-04 RX ORDER — SODIUM POLYSTYRENE SULFONATE 15 G/60ML
60 SUSPENSION ORAL; RECTAL
Status: DISCONTINUED | OUTPATIENT
Start: 2019-03-04 | End: 2019-03-04

## 2019-03-04 RX ORDER — MORPHINE SULFATE 2 MG/ML
1 INJECTION, SOLUTION INTRAMUSCULAR; INTRAVENOUS
Status: DISCONTINUED | OUTPATIENT
Start: 2019-03-04 | End: 2019-03-06

## 2019-03-04 RX ORDER — HYDRALAZINE HYDROCHLORIDE 20 MG/ML
10 INJECTION INTRAMUSCULAR; INTRAVENOUS
Status: DISCONTINUED | OUTPATIENT
Start: 2019-03-04 | End: 2019-03-04

## 2019-03-04 RX ORDER — SODIUM POLYSTYRENE SULFONATE 4.1 MEQ/G
60 POWDER, FOR SUSPENSION ORAL; RECTAL
Status: DISPENSED | OUTPATIENT
Start: 2019-03-04 | End: 2019-03-05

## 2019-03-04 RX ORDER — SODIUM CHLORIDE 9 MG/ML
10 INJECTION INTRAMUSCULAR; INTRAVENOUS; SUBCUTANEOUS ONCE
Status: COMPLETED | OUTPATIENT
Start: 2019-03-04 | End: 2019-03-04

## 2019-03-04 RX ORDER — CLONIDINE HYDROCHLORIDE 0.1 MG/1
0.2 TABLET ORAL
Status: COMPLETED | OUTPATIENT
Start: 2019-03-04 | End: 2019-03-04

## 2019-03-04 RX ORDER — ONDANSETRON 2 MG/ML
4 INJECTION INTRAMUSCULAR; INTRAVENOUS
Status: COMPLETED | OUTPATIENT
Start: 2019-03-04 | End: 2019-03-04

## 2019-03-04 RX ORDER — SODIUM CHLORIDE 9 MG/ML
50 INJECTION, SOLUTION INTRAVENOUS
Status: COMPLETED | OUTPATIENT
Start: 2019-03-04 | End: 2019-03-05

## 2019-03-04 RX ADMIN — HYDROMORPHONE HYDROCHLORIDE 1 MG: 1 INJECTION, SOLUTION INTRAMUSCULAR; INTRAVENOUS; SUBCUTANEOUS at 18:42

## 2019-03-04 RX ADMIN — CLONIDINE HYDROCHLORIDE 0.2 MG: 0.1 TABLET ORAL at 19:33

## 2019-03-04 RX ADMIN — MORPHINE SULFATE 4 MG: 2 INJECTION, SOLUTION INTRAMUSCULAR; INTRAVENOUS at 17:44

## 2019-03-04 RX ADMIN — SODIUM CHLORIDE 10 ML: 9 INJECTION INTRAMUSCULAR; INTRAVENOUS; SUBCUTANEOUS at 18:09

## 2019-03-04 RX ADMIN — SODIUM CHLORIDE 50 ML/HR: 900 INJECTION, SOLUTION INTRAVENOUS at 18:09

## 2019-03-04 RX ADMIN — IOPAMIDOL 100 ML: 755 INJECTION, SOLUTION INTRAVENOUS at 18:09

## 2019-03-04 RX ADMIN — MORPHINE SULFATE 1 MG: 2 INJECTION, SOLUTION INTRAMUSCULAR; INTRAVENOUS at 22:40

## 2019-03-04 RX ADMIN — ONDANSETRON 4 MG: 2 INJECTION INTRAMUSCULAR; INTRAVENOUS at 18:41

## 2019-03-04 NOTE — ED TRIAGE NOTES
Triage Note: Patient complains of lower abdominal pain that started today. Denies constipation or diarrhea.  +N/V.

## 2019-03-04 NOTE — ED PROVIDER NOTES
HPI     Pt is a 36 y.o. F with ESRD on HD here with c/o lower abdominal pain x 2 - 3 days that is worsening. She has h/o peritoneal dialysis with infection and says this feels similar. However, she now gets HD with her next session being tomorrow. She makes very little urine and denies urinary sx. No N/V/D or fever or chills. Past Medical History:   Diagnosis Date    Anemia     secondary to lupus    Asthma     no inhaler use in past 2 to 3 years    Carditis     Chronic kidney disease     ESRD    Chronic pain     DDD (degenerative disc disease), lumbar     ESRD (end stage renal disease) (HCC)     GERD (gastroesophageal reflux disease)     Hemodialysis patient ([facility] [address] 2017    [address]  Tuesday,  Thursday,  and Saturday.  Hypercholesterolemia     Hypertension     Intractable nausea and vomiting 10/21/2015    Long term (current) use of anticoagulants     Lupus (systemic lupus erythematosus) (HCC)     Malignant hypertension with chronic kidney disease stage V ([facility] [address]     Peritoneal dialysis status ([facility] [address] 10/2015    x 2 years Stopped 2017 due to infection and removed.  Poor historian 2018    With medications    Thromboembolus ([facility] [address] 2013    lungs    Transfusion history     Last Transfusion 2017  at [facility]       Past Surgical History:   Procedure Laterality Date    HX  SECTION  11/2006    x1    HX OTHER SURGICAL  9/16/15    INSERTION PD CATH;  Removed 2017    HX VASCULAR ACCESS Right 2017    Double-Lumen henry catheter upper chest    HX VASCULAR ACCESS Right 2018    right upper arm         Family History:   Problem Relation Age of Onset    Diabetes Father     Hypertension Father     Cancer Other         aunt with breast cancer    Diabetes Mother        Social History     Socioeconomic History    Marital status: SINGLE     Spouse name: Not on file    Number of children: Not on file    Years of education: Not on file    Highest education level: Not on file   Social Needs    Financial resource strain: Not on file    Food insecurity - worry: Not on file    Food insecurity - inability: Not on file    Transportation needs - medical: Not on file   [employer] needs - non-medical: Not on file   Occupational History    Not on file   Tobacco Use    Smoking status: Never Smoker    Smokeless tobacco: Never Used   Substance and Sexual Activity    Alcohol use: No    Drug use: Yes     Types: Prescription, OTC    Sexual activity: Not Currently   Other Topics Concern     Service No    Blood Transfusions No    Caffeine Concern No    Occupational Exposure No    Hobby Hazards No    Sleep Concern No    Stress Concern No    Weight Concern No    Special Diet No    Back Care Yes     Comment: low back pain    Exercise No    Bike Helmet No    Seat Belt Yes    Self-Exams No   Social History Narrative    Lives with parents and daughter. ALLERGIES: Compazine [prochlorperazine edisylate] and Compazine [prochlorperazine]    Review of Systems   Constitutional: Negative for diaphoresis and fever. Respiratory: Negative for chest tightness and shortness of breath. Gastrointestinal: Positive for abdominal pain. Negative for nausea and vomiting. Genitourinary: Negative for flank pain. Musculoskeletal: Negative for back pain. Vitals:    03/04/19 1715 03/04/19 1730   BP: (!) 180/122 (!) 189/122   Pulse: 97 95   Resp: 18 18   Temp: 97.4 °F (36.3 °C)    SpO2: 95% 95%            Physical Exam   Constitutional: She is oriented to person, place, and time. She appears well-developed and well-nourished. Non-toxic appearance. She does not appear ill. No distress. HENT:   Mouth/Throat: Mucous membranes are dry. Abdominal: Soft. Normal appearance and bowel sounds are normal. There is tenderness (diffuse). There is no rigidity, no guarding and no CVA tenderness. Musculoskeletal: Normal range of motion.  She exhibits no edema or tenderness. fistula to UE   Neurological: She is alert and oriented to person, place, and time. No cranial nerve deficit or sensory deficit. Skin: Skin is warm and dry. No erythema. Psychiatric: She has a normal mood and affect. Vitals reviewed. MDM       Procedures    Pt now having emesis after morphine. zofran ordered. She c/o she is still having pain thus dilauded ordered. Pt has elevated potassium. EKG ordered      EKG 18:21  Sinus rhythm  HR 91  No STEMI  No ST depression  QT/QTc  228/286    IP consult to hospitalist (Dr. Yael Briceño)    IP consult to nephrology (Dr. Yrn Gonzáles)  Advises to give 60 mg of kayaxelate and for nursing to call when she has bed assignment so he can arrange dialysis. Pt actually Dr. Opal Munoz pt thus their nephrology group consulted and agrees to arrange dialysis. Hospitalist Wilbert for Admission  7:17 PM    ED Room Number: SER08/08  Patient Name and age:  Georgia Breaker 36 y.o.  female  Working Diagnosis:   1. Acute hyperkalemia    2.  Abdominal pain, LLQ (left lower quadrant)      Readmission: no  Isolation Requirements:  no  Recommended Level of Care:  med/surg  Code Status:  Full Code      Jonathan Guerra MD

## 2019-03-05 ENCOUNTER — PATIENT OUTREACH (OUTPATIENT)
Dept: FAMILY MEDICINE CLINIC | Age: 34
End: 2019-03-05

## 2019-03-05 LAB
ANION GAP SERPL CALC-SCNC: 10 MMOL/L (ref 5–15)
ATRIAL RATE: 95 BPM
BASOPHILS # BLD: 0 K/UL (ref 0–0.1)
BASOPHILS NFR BLD: 1 % (ref 0–1)
BUN SERPL-MCNC: 46 MG/DL (ref 6–20)
BUN/CREAT SERPL: 6 (ref 12–20)
CALCIUM SERPL-MCNC: 8 MG/DL (ref 8.5–10.1)
CALCULATED P AXIS, ECG09: 36 DEGREES
CALCULATED R AXIS, ECG10: 66 DEGREES
CALCULATED T AXIS, ECG11: -138 DEGREES
CHLORIDE SERPL-SCNC: 96 MMOL/L (ref 97–108)
CO2 SERPL-SCNC: 27 MMOL/L (ref 21–32)
CREAT SERPL-MCNC: 7.33 MG/DL (ref 0.55–1.02)
DIAGNOSIS, 93000: NORMAL
DIFFERENTIAL METHOD BLD: ABNORMAL
EOSINOPHIL # BLD: 0.1 K/UL (ref 0–0.4)
EOSINOPHIL NFR BLD: 3 % (ref 0–7)
ERYTHROCYTE [DISTWIDTH] IN BLOOD BY AUTOMATED COUNT: 15 % (ref 11.5–14.5)
GLUCOSE BLD STRIP.AUTO-MCNC: 84 MG/DL (ref 65–100)
GLUCOSE SERPL-MCNC: 78 MG/DL (ref 65–100)
HBV SURFACE AG SER QL: <0.1 INDEX
HBV SURFACE AG SER QL: NEGATIVE
HCT VFR BLD AUTO: 35.6 % (ref 35–47)
HGB BLD-MCNC: 11.2 G/DL (ref 11.5–16)
IMM GRANULOCYTES # BLD AUTO: 0 K/UL (ref 0–0.04)
IMM GRANULOCYTES NFR BLD AUTO: 1 % (ref 0–0.5)
LYMPHOCYTES # BLD: 0.6 K/UL (ref 0.8–3.5)
LYMPHOCYTES NFR BLD: 27 % (ref 12–49)
MCH RBC QN AUTO: 28.4 PG (ref 26–34)
MCHC RBC AUTO-ENTMCNC: 31.5 G/DL (ref 30–36.5)
MCV RBC AUTO: 90.1 FL (ref 80–99)
MONOCYTES # BLD: 0.2 K/UL (ref 0–1)
MONOCYTES NFR BLD: 10 % (ref 5–13)
NEUTS SEG # BLD: 1.3 K/UL (ref 1.8–8)
NEUTS SEG NFR BLD: 59 % (ref 32–75)
NRBC # BLD: 0 K/UL (ref 0–0.01)
NRBC BLD-RTO: 0 PER 100 WBC
P-R INTERVAL, ECG05: 368 MS
PLATELET # BLD AUTO: 124 K/UL (ref 150–400)
PMV BLD AUTO: 11.3 FL (ref 8.9–12.9)
POTASSIUM SERPL-SCNC: 4.5 MMOL/L (ref 3.5–5.1)
Q-T INTERVAL, ECG07: 228 MS
QRS DURATION, ECG06: 82 MS
QTC CALCULATION (BEZET), ECG08: 286 MS
RBC # BLD AUTO: 3.95 M/UL (ref 3.8–5.2)
SERVICE CMNT-IMP: NORMAL
SODIUM SERPL-SCNC: 133 MMOL/L (ref 136–145)
VENTRICULAR RATE, ECG03: 95 BPM
WBC # BLD AUTO: 2.1 K/UL (ref 3.6–11)

## 2019-03-05 PROCEDURE — 36415 COLL VENOUS BLD VENIPUNCTURE: CPT

## 2019-03-05 PROCEDURE — 74011250636 HC RX REV CODE- 250/636: Performed by: FAMILY MEDICINE

## 2019-03-05 PROCEDURE — 74011636637 HC RX REV CODE- 636/637: Performed by: FAMILY MEDICINE

## 2019-03-05 PROCEDURE — 80048 BASIC METABOLIC PNL TOTAL CA: CPT

## 2019-03-05 PROCEDURE — 94760 N-INVAS EAR/PLS OXIMETRY 1: CPT

## 2019-03-05 PROCEDURE — 85025 COMPLETE CBC W/AUTO DIFF WBC: CPT

## 2019-03-05 PROCEDURE — 82962 GLUCOSE BLOOD TEST: CPT

## 2019-03-05 PROCEDURE — 90935 HEMODIALYSIS ONE EVALUATION: CPT

## 2019-03-05 PROCEDURE — 96376 TX/PRO/DX INJ SAME DRUG ADON: CPT

## 2019-03-05 PROCEDURE — A9270 NON-COVERED ITEM OR SERVICE: HCPCS | Performed by: FAMILY MEDICINE

## 2019-03-05 PROCEDURE — 74011250637 HC RX REV CODE- 250/637: Performed by: FAMILY MEDICINE

## 2019-03-05 PROCEDURE — 74011250637 HC RX REV CODE- 250/637: Performed by: INTERNAL MEDICINE

## 2019-03-05 PROCEDURE — 99218 HC RM OBSERVATION: CPT

## 2019-03-05 RX ORDER — PREDNISONE 10 MG/1
10 TABLET ORAL DAILY
Status: DISCONTINUED | OUTPATIENT
Start: 2019-03-05 | End: 2019-03-06 | Stop reason: HOSPADM

## 2019-03-05 RX ORDER — POLYETHYLENE GLYCOL 3350 17 G/17G
17 POWDER, FOR SOLUTION ORAL
Status: DISCONTINUED | OUTPATIENT
Start: 2019-03-05 | End: 2019-03-06 | Stop reason: HOSPADM

## 2019-03-05 RX ORDER — OXYCODONE AND ACETAMINOPHEN 5; 325 MG/1; MG/1
1 TABLET ORAL
Status: DISCONTINUED | OUTPATIENT
Start: 2019-03-05 | End: 2019-03-06

## 2019-03-05 RX ORDER — SENNOSIDES 8.6 MG/1
1 TABLET ORAL
Status: DISCONTINUED | OUTPATIENT
Start: 2019-03-05 | End: 2019-03-06 | Stop reason: HOSPADM

## 2019-03-05 RX ORDER — ACETAMINOPHEN 500 MG
1000 TABLET ORAL
Status: DISCONTINUED | OUTPATIENT
Start: 2019-03-05 | End: 2019-03-06 | Stop reason: HOSPADM

## 2019-03-05 RX ORDER — HYDROXYCHLOROQUINE SULFATE 200 MG/1
200 TABLET, FILM COATED ORAL 2 TIMES DAILY
Status: DISCONTINUED | OUTPATIENT
Start: 2019-03-05 | End: 2019-03-06 | Stop reason: HOSPADM

## 2019-03-05 RX ORDER — AMITRIPTYLINE HYDROCHLORIDE 50 MG/1
50 TABLET, FILM COATED ORAL
Status: DISCONTINUED | OUTPATIENT
Start: 2019-03-05 | End: 2019-03-06 | Stop reason: HOSPADM

## 2019-03-05 RX ORDER — AZATHIOPRINE 50 MG/1
50 TABLET ORAL
Status: DISCONTINUED | OUTPATIENT
Start: 2019-03-05 | End: 2019-03-06 | Stop reason: HOSPADM

## 2019-03-05 RX ORDER — CARVEDILOL 12.5 MG/1
25 TABLET ORAL 2 TIMES DAILY WITH MEALS
Status: DISCONTINUED | OUTPATIENT
Start: 2019-03-05 | End: 2019-03-06 | Stop reason: HOSPADM

## 2019-03-05 RX ORDER — ALBUTEROL SULFATE 0.83 MG/ML
2.5 SOLUTION RESPIRATORY (INHALATION)
Status: DISCONTINUED | OUTPATIENT
Start: 2019-03-05 | End: 2019-03-06 | Stop reason: HOSPADM

## 2019-03-05 RX ORDER — ALBUTEROL SULFATE 90 UG/1
1-2 AEROSOL, METERED RESPIRATORY (INHALATION)
Status: DISCONTINUED | OUTPATIENT
Start: 2019-03-05 | End: 2019-03-05 | Stop reason: CLARIF

## 2019-03-05 RX ORDER — AMLODIPINE BESYLATE 5 MG/1
10 TABLET ORAL DAILY
Status: DISCONTINUED | OUTPATIENT
Start: 2019-03-05 | End: 2019-03-06 | Stop reason: HOSPADM

## 2019-03-05 RX ORDER — PANTOPRAZOLE SODIUM 40 MG/1
40 TABLET, DELAYED RELEASE ORAL
Status: DISCONTINUED | OUTPATIENT
Start: 2019-03-06 | End: 2019-03-06 | Stop reason: HOSPADM

## 2019-03-05 RX ORDER — AMLODIPINE BESYLATE 5 MG/1
10 TABLET ORAL DAILY
Status: DISCONTINUED | OUTPATIENT
Start: 2019-03-06 | End: 2019-03-05

## 2019-03-05 RX ORDER — GEMFIBROZIL 600 MG/1
300 TABLET, FILM COATED ORAL DAILY
Status: DISCONTINUED | OUTPATIENT
Start: 2019-03-06 | End: 2019-03-06 | Stop reason: HOSPADM

## 2019-03-05 RX ADMIN — MORPHINE SULFATE 1 MG: 2 INJECTION, SOLUTION INTRAMUSCULAR; INTRAVENOUS at 09:01

## 2019-03-05 RX ADMIN — PREDNISONE 10 MG: 10 TABLET ORAL at 13:18

## 2019-03-05 RX ADMIN — Medication 10 ML: at 22:12

## 2019-03-05 RX ADMIN — MORPHINE SULFATE 1 MG: 2 INJECTION, SOLUTION INTRAMUSCULAR; INTRAVENOUS at 18:14

## 2019-03-05 RX ADMIN — Medication 10 ML: at 03:35

## 2019-03-05 RX ADMIN — Medication 10 ML: at 22:11

## 2019-03-05 RX ADMIN — Medication 10 ML: at 13:51

## 2019-03-05 RX ADMIN — FERRIC CITRATE 210 MG: 210 TABLET, COATED ORAL at 18:05

## 2019-03-05 RX ADMIN — AMITRIPTYLINE HYDROCHLORIDE 50 MG: 50 TABLET, FILM COATED ORAL at 22:11

## 2019-03-05 RX ADMIN — MORPHINE SULFATE 1 MG: 2 INJECTION, SOLUTION INTRAMUSCULAR; INTRAVENOUS at 03:35

## 2019-03-05 RX ADMIN — Medication 10 ML: at 09:01

## 2019-03-05 RX ADMIN — MORPHINE SULFATE 1 MG: 2 INJECTION, SOLUTION INTRAMUSCULAR; INTRAVENOUS at 13:26

## 2019-03-05 RX ADMIN — MORPHINE SULFATE 1 MG: 2 INJECTION, SOLUTION INTRAMUSCULAR; INTRAVENOUS at 22:11

## 2019-03-05 RX ADMIN — HYDROXYCHLOROQUINE SULFATE 200 MG: 200 TABLET, FILM COATED ORAL at 13:19

## 2019-03-05 RX ADMIN — CARVEDILOL 25 MG: 12.5 TABLET, FILM COATED ORAL at 18:03

## 2019-03-05 RX ADMIN — AZATHIOPRINE 50 MG: 50 TABLET ORAL at 13:51

## 2019-03-05 RX ADMIN — HYDROXYCHLOROQUINE SULFATE 200 MG: 200 TABLET, FILM COATED ORAL at 22:11

## 2019-03-05 RX ADMIN — FERRIC CITRATE 210 MG: 210 TABLET, COATED ORAL at 13:18

## 2019-03-05 RX ADMIN — AMLODIPINE BESYLATE 10 MG: 5 TABLET ORAL at 22:11

## 2019-03-05 NOTE — DIALYSIS
[facility]  [phone]    Vitals   Pre   Post   Assessment   Pre   Post     Temp  98.1  98.1 LOC  A&O x4 A&O x4   HR   80 80 Lungs   Clear/dim  clear/dim   B/P  141/98 144/100 Cardiac   Remote tele  remote tele   Resp   16 16 Skin   Warm/ dry  warm/dry   Pain level  7/10  RN gave pain meds 0/10 resting w/ eyes closed Edema  generalized     generalized   Orders:    Duration:   Start:   3002 End:    0045 Total:   2 hrs   Dialyzer:   Dialyzer/Set Up Inspection: Anamika Venkatesh (03/04/19 2245)   K Bath:   Dialysate K (mEq/L): 1 (03/04/19 2245)   Ca Bath:   Dialysate CA (mEq/L): 2.5 (03/04/19 2245)   Na/Bicarb:    Bicarb 35   Target Fluid Removal:   Goal/Amount of Fluid to Remove (mL): 2000 mL (03/04/19 2245)   Access     Type & Location:   PETER AVG +bruit/+thrill, cannulated x 2 15 g needles, HD + w/out issue. Labs     Obtained/Reviewed   Critical Results Called   Date when labs were drawn- 3/4/19  Hgb-    HGB   Date Value Ref Range Status   03/04/2019 12.1 11.5 - 16.0 g/dL Final     Comment:     INVESTIGATED PER DELTA CHECK PROTOCOL     K-    Potassium   Date Value Ref Range Status   03/04/2019 6.6 (HH) 3.5 - 5.1 mmol/L Final     Comment:     RESULTS VERIFIED, PHONED TO AND READ BACK BY  SIRISHA RN @[facility]       Ca-   Calcium   Date Value Ref Range Status   03/04/2019 8.3 (L) 8.5 - 10.1 MG/DL Final     Bun-   BUN   Date Value Ref Range Status   03/04/2019 77 (H) 6 - 20 MG/DL Final     Creat-   Creatinine   Date Value Ref Range Status   03/04/2019 9.77 (H) 0.55 - 1.02 MG/DL Final        Medications/ Blood Products Given     Name   Dose   Route and Time                     Blood Volume Processed (BVP):    44 Net Fluid   Removed:  2000 ml   Comments   Time Out Done: 2230  Primary Nurse Rpt SLV:[id number] Devaughn Fitzgerald RN  Primary Nurse Rpt Post: 0100 L.  Ayanna Gram, RN  Pt Education: Given: procedural, K, fluid control  Care Plan: See nephrology notes  Tx Summary:Tolerated tx well, at end all blood returned. Held pressure x 2 sites until no bleeding visualized. Remains in rm 360, call bell in reach, bed rails up. Gave report to RN.     Admiting Diagnosis: ESRD/ Hyperkalemia  Pt's previous clinic- n/a  Consent signed - Informed Consent Verified: Yes (03/04/19 2245)  Crystalita Consent - Yes 3/4/19 2245  Hepatitis Status- HB unknown, orders in to draw  Machine #- Machine Number: [id number] (03/04/19 2245)  Telemetry status- remote telemetry

## 2019-03-05 NOTE — PROGRESS NOTES
0000  Received report on patient. 2653  Report from dialysis   nurse, 2000 ml fluids removed. Patient refused SCDs, chlorhexidine bath and sodium polystyrene    Patient BG at 74. Orange juice 4 oz.given BG re-checked after 15 min. BG at 84. Bedside shift change report given to [facility] Lauren Prajapati (oncoming nurse) by Mandy Chavira (offgoing nurse). Report included the following information SBAR, Kardex, ED Summary, Procedure Summary, Intake/Output, MAR, Accordion, Recent Results, Med Rec Status and Cardiac Rhythm NSR.

## 2019-03-05 NOTE — PROGRESS NOTES
Hospitalist Progress Note  Yonny Salinas MD  Answering service: [phone] from in house phone        Date of Service:  3/5/2019  NAME:  Pro Pope  :  1985  MRN:  [id number]      Admission Summary:   36 y.o. F with PMH of Lupus ,chornic anemia,asthma ,chronic pain,ESRD on HD here with c/o lower abdominal pain x 2 - 3 days that is worsening. Thanh President has h/o peritoneal dialysis with infection and says this feels similar. , now gets HD due on 3/5/19. She [facility] had a CT of the abdomen and pelvis with IV contrast, showed bile obstruction with a 6.4 cm left ovarian lesion, felt most likely hemorrhagic cyst.  She was given morphine 4 mg IV, Dilaudid 1 mg IV, Zofran 4 mg IV.  Potassium peak was 6.6    Interval history / Subjective:    lower abdominal pain, denies n/v,  S/p HD yesterday     Assessment & Plan:     Generalized abdominal pain. -OB/GTYN consult regards to ultrasound findings with left ovarian lesion, possibly  hemorrhagic cyst.    -hold eliquis   h/h  -f/u gyn recc's     Acute hyperkalemia. resolved   -s/p HD  -repeat levels returned to karin  -Nephrology consulted   . Hypoxia.   -initially required,supp O2, now on RA   -cxr with no acute proscess  -has h/o asthma,stable currently        End-stage renal disease, hemodialysis.  Plan as noted above.     Thrombocytopenia/neutropenia, monitor  -pt is on chronic steroids and Imuran for SLE, possibly contributing     Hypertension.  Resume the patient back on home medications.      Hyperlipidemia.  Continue current home medicine.     Nausea and vomiting.  Ordered Zofran 4 mg IV q.6 hours p.r.n.     H/o PE, hold eliquis as mentioned above            Code status: full  DVT prophylaxis: scd,holding  eliquis       Care Plan discussed with: Patient/Family and Nurse  Disposition tbd  DOWNGRADE TO [place] [address] Problems  Date Reviewed: 3/4/2019          Codes Class Noted POA    * (Principal) Hyperkalemia ICD-10-CM: E87.5  ICD-9-CM: 276.7  3/4/2019 Unknown                Review of Systems:   A comprehensive review of systems was negative except for that written in the HPI. Vital Signs:    Last 24hrs VS reviewed since prior progress note. Most recent are:  Visit Vitals  BP (!) 148/100 (BP 1 Location: Left arm, BP Patient Position: At rest;Head of bed elevated (Comment degrees))   Pulse 80   Temp 98 °F (36.7 °C)   Resp 16   Ht 5' 5\" (1.651 m)   Wt 79.6 kg (175 lb 6.4 oz)   SpO2 95%   Breastfeeding?  No   BMI 29.19 kg/m²       No intake or output data in the 24 hours ending 03/05/19 1157     Physical Examination:     Gen:  Well-developed, well-nourished, in no acute distress  HEENT:  Pink conjunctivae, PERRL, hearing intact to voice, moist mucous membranes  Neck:  Supple, without masses, thyroid non-tender  Resp:  No accessory muscle use, clear breath sounds without wheezes rales or rhonchi  Card:  No murmurs, normal S1, S2 without thrills, bruits or peripheral edema  Abd:  Soft, non-tender, non-distended, normoactive bowel sounds are present, no palpable masses  Musc:  No cyanosis or clubbing  Skin:  No rashes or ulcers, skin turgor is good  Neuro:  Cranial nerves 3-12 are grossly intact,  strength is 5/5  is 5/5 bilaterally, follows commands appropriately  Psych:  Good insight, oriented to person, place and time, alert             Data Review:    Review and/or order of clinical lab test      Labs:     Recent Labs     03/05/19  0406 03/04/19  1718   WBC 2.1* 3.0*   HGB 11.2* 12.1   HCT 35.6 38.7   * 148*     Recent Labs     03/05/19  0406 03/04/19  1745 03/04/19  1718   *  --  138   K 4.5  --  6.6*   CL 96*  --  98   CO2 27  --  28   BUN 46*  --  77*   CREA 7.33*  --  9.77*   GLU 78  --  84   CA 8.0*  --  8.3*   MG  --  2.2  --      Recent Labs     03/04/19  1718   SGOT 32   ALT 12   AP 96   TBILI 0.4   TP 8.3*   ALB 3.5   GLOB 4.8*   LPSE 174     No results for input(s): INR, PTP, APTT in the last 72 hours. No lab exists for component: INREXT   No results for input(s): FE, TIBC, PSAT, FERR in the last 72 hours. Lab Results   Component Value Date/Time    Folate 13.7 06/17/2018 03:00 PM      No results for input(s): PH, PCO2, PO2 in the last 72 hours. No results for input(s): CPK, CKNDX, TROIQ in the last 72 hours.     No lab exists for component: CPKMB  Lab Results   Component Value Date/Time    Cholesterol, total 140 09/11/2018 06:17 AM    HDL Cholesterol 43 09/11/2018 06:17 AM    LDL, calculated 60.4 09/11/2018 06:17 AM    Triglyceride 183 (H) 09/11/2018 06:17 AM    CHOL/HDL Ratio 3.3 09/11/2018 06:17 AM     Lab Results   Component Value Date/Time    Glucose (POC) 84 03/05/2019 08:11 AM    Glucose (POC) 104 (H) 08/16/2018 11:06 AM    Glucose (POC) 164 (H) 07/22/2018 11:43 AM    Glucose (POC) 138 (H) 07/21/2018 09:22 PM    Glucose (POC) 111 (H) 07/21/2018 04:57 PM     Lab Results   Component Value Date/Time    Color YELLOW/STRAW 08/12/2016 09:06 PM    Appearance CLEAR 08/12/2016 09:06 PM    Specific gravity 1.017 08/12/2016 09:06 PM    Specific gravity 1.010 07/11/2016 12:44 PM    pH (UA) 7.0 08/12/2016 09:06 PM    Protein 300 (A) 08/12/2016 09:06 PM    Glucose NEGATIVE  08/12/2016 09:06 PM    Ketone TRACE (A) 08/12/2016 09:06 PM    Bilirubin NEGATIVE  07/11/2016 12:44 PM    Urobilinogen 1.0 08/12/2016 09:06 PM    Nitrites NEGATIVE  08/12/2016 09:06 PM    Leukocyte Esterase NEGATIVE  08/12/2016 09:06 PM    Epithelial cells MODERATE (A) 08/12/2016 09:06 PM    Bacteria 1+ (A) 08/12/2016 09:06 PM    WBC 0-4 08/12/2016 09:06 PM    RBC 0-5 08/12/2016 09:06 PM         Medications Reviewed:     Current Facility-Administered Medications   Medication Dose Route Frequency    acetaminophen (TYLENOL) tablet 1,000 mg  1,000 mg Oral Q6H PRN    albuterol (PROVENTIL HFA, VENTOLIN HFA, PROAIR HFA) inhaler 1-2 Puff  1-2 Puff Inhalation Q4H PRN    amitriptyline (ELAVIL) tablet 50 mg 50 mg Oral QHS    [START ON 3/6/2019] amLODIPine (NORVASC) tablet 10 mg  10 mg Oral DAILY    apixaban (ELIQUIS) tablet 5 mg  5 mg Oral BID    [START ON 3/6/2019] azaTHIOprine (IMURAN) tablet 50 mg  50 mg Oral DAILY AFTER BREAKFAST    carvedilol (COREG) tablet 25 mg  25 mg Oral BID WITH MEALS    ferric citrate (AURYXIA) tablet 210 mg  210 mg Oral TID WITH MEALS    [START ON 3/6/2019] gemfibrozil (LOPID) tablet 300 mg  300 mg Oral DAILY    hydroxychloroquine (PLAQUENIL) tablet 200 mg  200 mg Oral BID    oxyCODONE-acetaminophen (PERCOCET) 5-325 mg per tablet 1 Tab  1 Tab Oral BID PRN    [START ON 3/6/2019] pantoprazole (PROTONIX) tablet 40 mg  40 mg Oral ACB    polyethylene glycol (MIRALAX) packet 17 g  17 g Oral DAILY PRN    [START ON 3/6/2019] predniSONE (DELTASONE) tablet 10 mg  10 mg Oral DAILY    senna (SENOKOT) tablet 8.6 mg  1 Tab Oral DAILY PRN    sodium chloride (NS) flush 5-40 mL  5-40 mL IntraVENous Q8H    sodium chloride (NS) flush 5-40 mL  5-40 mL IntraVENous PRN    sodium chloride (NS) flush 5-40 mL  5-40 mL IntraVENous Q8H    sodium chloride (NS) flush 5-40 mL  5-40 mL IntraVENous PRN    morphine injection 1 mg  1 mg IntraVENous Q4H PRN     ______________________________________________________________________  EXPECTED LENGTH OF STAY: - - -  ACTUAL LENGTH OF STAY:          0                 Nenita Dubois MD

## 2019-03-05 NOTE — CONSULTS
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 Will need OB/GYN f/u    Plan/Recommendations:  Resume HD later today to keep her on schedule  Low K diet  Fluid restriction  Hold DEANDRA  Can consider discharge tomorrow if clinically stable    Discussed with patient  Thanks for the consultation. Renal service will follow patient with you. Please contact me with any questions or concerns. Initial Consult note         Patient name: Wilberto Mera  MR no: [id number]  Date of admission: 3/4/2019  Date of consultation: 3/5/2019  Requested by: Dr. Angelyn Cockayne  Reason for consult: ESRD/Hyperkalemia    Patient seen and examined. History obtained from patient and chart review. Relevant labs, data and notes reviewed. HPI: Wilberto Mera is a 36 y.o. female with PMH significant for ESRD on chronic HD TTS at [facility], hx of lupus nephritis well known to our service presented overnight with abd pain. Noted to have an elevated serum K 6.6-> emergently dialzyed for 2hrs/1K bath. Serum K has improved to 4.5. Patient reports acute abd pain yesterday with one episode of vomiting. CT abd suggestive of possible left hemorrhagic ovarian cyst. Sx have subsided. PMH:  Past Medical History:   Diagnosis Date    Anemia     secondary to lupus    Asthma     no inhaler use in past 2 to 3 years    Carditis     Chronic kidney disease     ESRD    Chronic pain     DDD (degenerative disc disease), lumbar     ESRD (end stage renal disease) (HCC)     GERD (gastroesophageal reflux disease)     Hemodialysis patient ([facility] [address] 12/21/2017    [address]  Tuesday,  Thursday,  and Saturday.      Hypercholesterolemia     Hypertension     Intractable nausea and vomiting 10/21/2015    Long term (current) use of anticoagulants     Lupus (systemic lupus erythematosus) (HCC)     Malignant hypertension with chronic kidney disease stage V ([facility] [address]     Peritoneal dialysis status ([facility] [address] 10/2015    x 2 years Stopped 2017 due to infection and removed.  Poor historian 2018    With medications    Thromboembolus ([facility] [address] 2013    lungs    Transfusion history     Last Transfusion 2017  at [facility]     [address]:  Past Surgical History:   Procedure Laterality Date    HX  SECTION  11/2006    x1    HX OTHER SURGICAL  9/16/15    INSERTION PD CATH; Removed 2017    HX VASCULAR ACCESS Right 2017    Double-Lumen henry catheter upper chest    HX VASCULAR ACCESS Right 2018    right upper arm       Social history:   Social History     Tobacco Use    Smoking status: Never Smoker    Smokeless tobacco: Never Used   Substance Use Topics    Alcohol use: No    Drug use: Yes     Types: Prescription, OTC       Family history:  Not contributory    Allergies   Allergen Reactions    Compazine [Prochlorperazine Edisylate] Nausea and Vomiting and Palpitations    Compazine [Prochlorperazine] Other (comments)     Hot and lightheaded. Current Facility-Administered Medications   Medication Dose Route Frequency Last Dose    sodium chloride (NS) flush 5-40 mL  5-40 mL IntraVENous Q8H 10 mL at 19 0335    sodium chloride (NS) flush 5-40 mL  5-40 mL IntraVENous PRN      sodium chloride (NS) flush 5-40 mL  5-40 mL IntraVENous Q8H 10 mL at 19 0901    sodium chloride (NS) flush 5-40 mL  5-40 mL IntraVENous PRN      morphine injection 1 mg  1 mg IntraVENous Q4H PRN 1 mg at 19 0901       ROS (besides HPI):    General: No fever. No weight changes  ENT: No hearing loss or visual changes  Cardiovascular: No Chest pain. NO IVY  Pulmonary: No SOB  GI: +abdominal pain. +Nausea/Vomiting. No Diarrhea. No blood in stool  : No blood in urine. No foamy or cloudy urine  Musculoskeletal: No joint swelling or redness.  No morning stiffness  Endocrine: no cold or heat intolerance  Psych: denies anxiety or depression  Neuro: No light headedness or dizziness    Objective   Visit Vitals  BP (!) 148/100 (BP 1 Location: Left arm, BP Patient Position: At rest;Head of bed elevated (Comment degrees)) Comment (BP Patient Position): 15 degrees   Pulse 80   Temp 98 °F (36.7 °C)   Resp 16   Ht 5' 5\" (1.651 m)   Wt 79.6 kg (175 lb 6.4 oz)   LMP 03/01/2019   SpO2 95%   Breastfeeding? No   BMI 29.19 kg/m²       Physical Exam:    Gen: NAD    HEENT: AT/NC, EOMI, moist mucous membrane, no scleral icterus    Neck: no JVD, no cervical lymphadenopathy, no carotid bruit    Lungs/Chest wall: Decreased AE b/l bases R>L    Cardiovascular: Normal S1/S2, normal rate, regular rhythm. Abdomen: soft, NT, ND, BS+, no HSM    Ext: L AVG . No edema    Skin: warm and dry. No rashes    : no CVA tenderness    CNS: alert awake. Answers appropriately. Labs/Data:    Lab Results   Component Value Date/Time    Sodium 133 (L) 03/05/2019 04:06 AM    Potassium 4.5 03/05/2019 04:06 AM    Chloride 96 (L) 03/05/2019 04:06 AM    CO2 27 03/05/2019 04:06 AM    Anion gap 10 03/05/2019 04:06 AM    Glucose 78 03/05/2019 04:06 AM    BUN 46 (H) 03/05/2019 04:06 AM    Creatinine 7.33 (H) 03/05/2019 04:06 AM    BUN/Creatinine ratio 6 (L) 03/05/2019 04:06 AM    GFR est AA 8 (L) 03/05/2019 04:06 AM    GFR est non-AA 6 (L) 03/05/2019 04:06 AM    Calcium 8.0 (L) 03/05/2019 04:06 AM       Lab Results   Component Value Date/Time    WBC 2.1 (L) 03/05/2019 04:06 AM    Hemoglobin (POC) 7.8 (L) 01/19/2018 12:30 PM    HGB 11.2 (L) 03/05/2019 04:06 AM    Hematocrit (POC) 23 (L) 01/19/2018 12:30 PM    HCT 35.6 03/05/2019 04:06 AM    PLATELET 550 (L) 56/43/9673 04:06 AM    MCV 90.1 03/05/2019 04:06 AM       Urine analysis: No results found for this or any previous visit.         No components found for: SPEP, UPEP  Lab Results   Component Value Date/Time    Protein,urine 24 hr 2071.00 (H) 05/30/2009 10:00 PM    Total protein 7.30 07/27/2009 03:30 AM     Lab Results   Component Value Date/Time    Microalbumin/Creat ratio (mg/g creat) 1015 09/22/2009 06:30 PM    Microalb/Creat ratio (ug/mg creat.) 2,443.5 (H) 03/07/2014 02:16 PM    Microalbumin,urine random 250.86 09/22/2009 06:30 PM       No intake or output data in the 24 hours ending 03/05/19 1117    Wt Readings from Last 3 Encounters:   03/05/19 79.6 kg (175 lb 6.4 oz)   02/19/19 76.1 kg (167 lb 12.3 oz)   01/09/19 70.8 kg (156 lb)       Signed by:  Areli Pedersen MD  Nephrology and Hypertension  [facility]

## 2019-03-05 NOTE — ED NOTES
TRANSFER - OUT REPORT:    Verbal report given to Renetta Amaro RN(name) on Angella Jimenez  being transferred to [address](unit) for routine progression of care       Report consisted of patients Situation, Background, Assessment and   Recommendations(SBAR). Information from the following report(s) SBAR, Kardex, ED Summary, MAR, Recent Results and Cardiac Rhythm Sinus  was reviewed with the receiving nurse. Lines:   Peripheral IV 03/04/19 Left Antecubital (Active)   Site Assessment Clean, dry, & intact 3/4/2019  5:18 PM   Phlebitis Assessment 0 3/4/2019  5:18 PM   Infiltration Assessment 0 3/4/2019  5:18 PM   Dressing Status Clean, dry, & intact 3/4/2019  5:18 PM   Dressing Type Transparent 3/4/2019  5:18 PM   Hub Color/Line Status Flushed;Patent 3/4/2019  5:18 PM        Opportunity for questions and clarification was provided.       Patient transported with:   Monitor

## 2019-03-05 NOTE — ED NOTES
Called [facility] for transport to [facility], will be 60-90 minutes for transport

## 2019-03-05 NOTE — PROCEDURES
[facility] D[facility] Team [facility]  [phone]    Vitals   Pre   Post   Assessment   Pre   Post     Temp  Temp: 98.7 °F (37.1 °C) (03/05/19 1730)  98.2 LOC  A & O X 4 A & O X 4   HR   Pulse (Heart Rate): 89 (03/05/19 1730) 169/117 Lungs   clear clear    B/P   BP: (!) 161/108 (03/05/19 1730) 88 Cardiac   Irreg rate & rhyt  Irreg rate & rhyt   Resp   Resp Rate: 16 (03/05/19 1730) 16 Skin   Warm & dry  Warm & dry   Pain level  Pain Intensity 1: 3 (03/05/19 1431) 6 Edema    Facial    Facial    Orders:    Duration:   Start:    3281 End:    2130 Total:   4hrs   Dialyzer:   Dialyzer/Set Up Inspection: Nick Jj (03/04/19 2245)   K Bath:   Dialysate K (mEq/L): 2 (03/05/19 1730)   Ca Bath:   Dialysate CA (mEq/L): 2.5 (03/05/19 1730)   Na/Bicarb:   Dialysate NA (mEq/L): 140 (03/05/19 1730)   Target Fluid Removal:   Goal/Amount of Fluid to Remove (mL): 2000 mL (03/04/19 2245)   Access  AVG   Type & Location:   Right upper AVG   Labs     Obtained/Reviewed   Critical Results Called   Date when labs were drawn-  Hgb-    HGB   Date Value Ref Range Status   03/05/2019 11.2 (L) 11.5 - 16.0 g/dL Final     K-    Potassium   Date Value Ref Range Status   03/05/2019 4.5 3.5 - 5.1 mmol/L Final     Comment:     INVESTIGATED PER DELTA CHECK PROTOCOL     Ca-   Calcium   Date Value Ref Range Status   03/05/2019 8.0 (L) 8.5 - 10.1 MG/DL Final     Bun-   BUN   Date Value Ref Range Status   03/05/2019 46 (H) 6 - 20 MG/DL Final     Comment:     INVESTIGATED PER DELTA CHECK PROTOCOL     Creat-   Creatinine   Date Value Ref Range Status   03/05/2019 7.33 (H) 0.55 - 1.02 MG/DL Final     Comment:     INVESTIGATED PER DELTA CHECK PROTOCOL        Medications/ Blood Products Given     Name   Dose   Route and Time     none                Blood Volume Processed (BVP):    98.4 Net Fluid   Removed:  3000ml   Comments   Time Out Done: 4034  Primary Nurse Rpt Pre: Nicole Reeves RN  Primary Nurse Rpt PostLoralee Yaritza, RN  Pt Education: Access care  Care Plan: continue HD  Tx Summary:JAZMYN AVF: skin CDI. No s/s of infection. No issues with cannulation or hemostasis. Running well at . I arrived to pt' room A&Ox4. Consent signed & on file. SBAR received from Primary RN. 0800: Pt cannulated with 29Z needles per policy & without issue. Labs drawn per request/ order. VSS. Dialysis Tx initiated. 1730: Pt resting quietly  1800: pt. Resting  1830: pt. Watching tv  1900: pt. Stable, family at bedside  1930: watching tv  2000: stable   2030: stable  2100: stable, watching  tv  2130 Tx ended. VSS. All possible blood returned to patient. Bleeding time Arterial 5min, venous 5min, Hemostasis achieved without issue. Bed locked and in the lowest position, call bell and belongings in reach. SBAR given to Primary, RN. Patient is stable at time of their/ my departure. All Dialysis related medications have been reviewed. Admiting Diagnosis:  Abdominal  Pt's previous clinic- [facility]  Consent signed - Informed Consent Verified: Yes (03/05/19 1730)  Crystalita Consent - confrimed  Hepatitis Status- negative   Machine #- Machine Number: [id number] (03/05/19 1730)  Telemetry status- yes  Pre-dialysis wt. -  bed scale not functioning

## 2019-03-05 NOTE — PROGRESS NOTES
SURJIT Follow up to patient seen in ED on 2/19/19  for viral syndrome, myalgia, headache and hyperkalemia. Patient verified by 3 identifiers. States, \" I was admitted to [facility] on yesterday for complains of lower abdominal pain that started when I went to [facility] ED. Denies constipation or diarrhea. +N/V. NN will continue to observe for discharge assessment.

## 2019-03-05 NOTE — H&P
Gynecology History and Physical    Name: Benjamin Dear MRN: [id number] SSN: [id number]    YOB: 1985  Age: 36 y.o. Sex: female       Subjective: ovarian cyst      Chief complaint:  Pelvic pain    Jessica Gilbert is a 36 y.o.  female with a history of SLE, renal failure on HD, Lupus carditis, h/o PE, asthma, HTN, hyperlipidemia who presented yesterday c/o onset of midline pelvic pain x1 day. Some nausea but no vomiting or bowel changes. Essentially anuric, no urinary sxs. CT scan findings include 6.4 cm left ovarian cyst, probably hemorrhagic. The current method of family planning is none. Menstrual periods are infrequent, maybe every few mos. Having one now. OB History     No data available        Past Medical History:   Diagnosis Date    Anemia     secondary to lupus    Asthma     no inhaler use in past 2 to 3 years    Carditis     Chronic kidney disease     ESRD    Chronic pain     DDD (degenerative disc disease), lumbar     ESRD (end stage renal disease) (HCC)     GERD (gastroesophageal reflux disease)     Hemodialysis patient ([facility] [address] 2017    [address]  Tuesday,  Thursday,  and Saturday. 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  Poor historian 2018    With medications    Thromboembolus ([facility] [address] 2013    lungs    Transfusion history     Last Transfusion 2017  at [address] [place] [address]     Past Surgical History:   Procedure Laterality Date    HX  SECTION  11/2006    x1    HX OTHER SURGICAL  9/16/15    INSERTION PD CATH;  Removed 2017    HX VASCULAR ACCESS Right 2017    Double-Lumen henry catheter upper chest    HX VASCULAR ACCESS Right 2018    right upper arm     Social History     Occupational History    Not on file   Tobacco Use    Smoking status: Never Smoker    Smokeless tobacco: Never Used   Substance and Sexual Activity    Alcohol use: No    Drug use: Yes     Types: Prescription, OTC    Sexual activity: Not Currently     Family History   Problem Relation Age of Onset    Diabetes Father     Hypertension Father     Cancer Other         aunt with breast cancer    Diabetes Mother         Allergies   Allergen Reactions    Compazine [Prochlorperazine Edisylate] Nausea and Vomiting and Palpitations    Compazine [Prochlorperazine] Other (comments)     Hot and lightheaded. Prior to Admission medications    Medication Sig Start Date End Date Taking? Authorizing Provider   oxyCODONE-acetaminophen (PERCOCET) 5-325 mg per tablet Take 1 Tab by mouth two (2) times daily as needed for Pain (moderate to severe  pain). Max Daily Amount: 2 Tabs. 2/6/19  Yes Sumit Hunt MD   amitriptyline (ELAVIL) 50 mg tablet Take 1 Tab by mouth nightly. 1/9/19  Yes Heron Josue NP   acetaminophen (TYLENOL EXTRA STRENGTH) 500 mg tablet Take 2 Tabs by mouth every six (6) hours as needed for Pain. 11/16/18  Yes Sid Tracey MD   ferric citrate (AURYXIA) 210 mg iron tablet Take 210 mg by mouth three (3) times daily (with meals). Yes Provider, Historical   carvedilol (COREG) 25 mg tablet Take 1 Tab by mouth two (2) times daily (with meals). 8/19/18  Yes Prosper Hunter MD   amLODIPine (NORVASC) 10 mg tablet Take 1 Tab by mouth daily. 7/22/18  Yes Annie Fang MD   apixaban (ELIQUIS) 5 mg tablet Take 5 mg by mouth two (2) times a day. Indications: pulmonary thromboembolism   Yes Provider, Historical   predniSONE (DELTASONE) 5 mg tablet Take 2 Tabs by mouth daily. 6/28/18  Yes Rain Mcdaniel MD   gemfibrozil (LOPID) 600 mg tablet Take 300 mg by mouth daily.  11/3/17  Yes Provider, Historical   azaTHIOprine [place]) 50 mg tablet Take 50 mg by mouth daily (after breakfast). 11/3/17  Yes Provider, Historical   albuterol (PROVENTIL HFA, VENTOLIN HFA, PROAIR HFA) 90 mcg/actuation inhaler Take 1-2 Puffs by inhalation every four (4) hours as needed for Wheezing or Shortness of Breath. 10/30/17  Yes Tigre Cody NP   hydroxychloroquine (PLAQUENIL) 200 mg tablet Take 200 mg by mouth two (2) times a day. Yes Tiny, MD Darnell   pantoprazole (PROTONIX) 40 mg tablet Take 1 Tab by mouth Daily (before breakfast). 10/23/15  Yes Kandis Huff MD   ondansetron (ZOFRAN ODT) 8 mg disintegrating tablet Take 1 Tab by mouth every eight (8) hours as needed for Nausea. 11/16/18   Ivet Harrison MD   polyethylene glycol (MIRALAX) 17 gram packet Take 17 g by mouth daily as needed. Provider, Historical   senna (SENNA) 8.6 mg tablet Take 1 Tab by mouth daily as needed for Constipation. for constipation    Provider, Historical        Review of Systems  Pertinent items are noted in the History of Present Illness. Objective:     Vitals:    03/05/19 1208 03/05/19 1730 03/05/19 1800 03/05/19 1816   BP: (!) 159/107 (!) 161/108 (!) 160/109 (!) 159/110   Pulse: 87 89 85 83   Resp: 16 16 16 16   Temp: 98.8 °F (37.1 °C) 98.7 °F (37.1 °C) 99.1 °F (37.3 °C) 99.1 °F (37.3 °C)   TempSrc:  Oral Oral    SpO2: 95%      Weight:       Height:           Physical Exam:  Patient without distress. Currently getting HD during our encounter  Abdomen: soft, nondistended, without guarding, without rebound, no masses palpated, mild TTP in midline suprapubic region  Pelvic exam deferred    Assessment/Plan:     Principal Problem:    Hyperkalemia (3/4/2019)       Patient with multiple medical problems as noted above and also currently with hyperkalemia (improved from 6.6 to 4.5 today) and leukopenia. Discussed with pt that the usual course of a cyst of this nature is spontaneous resolution and that pain is usually worst initially and then improves gradually.   Most cysts can be followed up with an ultrasound as an outpt to confirm improvement or resolution. The hope would certainly be to avoid surgery in this patient with multiple significant medical problems and resultant immunosuppression. Also has a h/o peritonitis due to peritoneal dialysis and therefore a higher risk of adhesions (C/S x1 as well). Pt currently getting relief from intermittent dosing of 1 mg IV morphine. Pt usually takes Percocet BID for chronic back pain. Would recommend trying to transition over to PO pain control tomorrow unless there is another contraindication to doing so- pt is not currently NPO (obviously would need to dose with consideration that she is not opioid naive). If pt's pain can be managed with PO meds then it is reasonable to manage as an outpt and she can F/U with Dr. Fouzia Morrison who is her prior gyn. Will follow along.         Signed By:  Dary Hall MD     March 5, 2019

## 2019-03-05 NOTE — PROGRESS NOTES
Problem: Falls - Risk of  Goal: *Absence of Falls  Document Alex Fall Risk and appropriate interventions in the flowsheet.   Outcome: Progressing Towards Goal  Fall Risk Interventions:                 Elimination Interventions: Call light in reach

## 2019-03-05 NOTE — ED NOTES
[facility] here to transport patient to [facility]. EMTALA completed. VSS. Respirations equal and unlabored on RA. IV saline locked. Patient in no acute distress upon transfer.

## 2019-03-05 NOTE — H&P
[address]  HISTORY AND PHYSICAL    Name:  Cliff Ruiz  MR#:  [id number]  :  1985  ACCOUNT #:  [de-identified]  ADMIT DATE:  2019      CHIEF COMPLAINT:  Abdominal pain. HISTORY OF PRESENT ILLNESS:  A 71-year-old  female with past medical  history of anemia, lupus, end-stage renal disease, on hemodialysis; carditis, asthma, chronic pain, degenerative disk disease, GERD, hypertension, hyperlipidemia, PE; presented as a direct admission/transfer from [facility] Emergency Department to [facility] with the patient's chief complaint of abdominal pain and other diagnosis of hyperkalemia. The patient initially went to [facility] today with complaints of abdominal pain ongoing for at least the past 3 days, worsening. The patient notes that pain is located in the suprapubic region, radiating to both lower quadrants. Pain is notably at a 7/10, aching, without specific alleviating factors. She went to [facility] had a CT of the abdomen and pelvis with IV contrast, showed bile obstruction with a 6.4 cm left ovarian lesion, felt most likely hemorrhagic cyst.  She was given morphine 4 mg IV, Dilaudid 1 mg IV, Zofran 4 mg IV. Potassium peak was 6.6. There was plan for the patient to receive Kayexalate, which she was not given prior to transfer. Request was that the patient be transferred to [facility].  On arrival, hemodialysis nurses are currently present and preparing for hemodialysis treatment. The patient notes she had nausea, vomiting 2 episodes, non-bloody, non-bilious emesis. Last movement was today. No reports of diarrhea. No reports of dizziness, lightheadedness, focal weakness, numbness, paresthesias, slurred speech, facial droop, headache, neck pain, back pain, shortness of breath, cough, congestion, calf pain, swelling, edema, fevers, chills, or rash. PAST MEDICAL HISTORY:  1. Anemia.   2. Systemic lupus erythematosus. 3.  End-stage renal disease. 4.  Hemodialysis. 5.  Degenerative disk disease. 6.  Carditis. 7.  Hypertension. 8.  Hyperlipidemia. 9.  Lung thromboembolism. PAST SURGICAL HISTORY:  1.  section in .  2.  Insertion of peritoneal hemodialysis catheter, 09/15/2015. 3.  Right arm AV fistula. 4.  James catheter, right chest, 2017. MEDICATIONS:  Repeat medication list reviewed and noted on chart records. acetaminophen (TYLENOL EXTRA STRENGTH) 500 mg tablet  3/4/2019  11/16/18  -- Ivet Harrison MD    Take 2 Tabs by mouth every six (6) hours as needed for Pain. albuterol (PROVENTIL HFA, VENTOLIN HFA, PROAIR HFA) 90 mcg/actuation inhaler  2019  10/30/17  -- Julia Hastings NP    Take 1-2 Puffs by inhalation every four (4) hours as needed for Wheezing or Shortness of Breath. amitriptyline (ELAVIL) 50 mg tablet  3/3/2019  01/09/19  --  Mariela Josue NP    Take 1 Tab by mouth nightly. amLODIPine (NORVASC) 10 mg tablet  3/4/2019  07/22/18  --  Citlalli Lomeli MD    Take 1 Tab by mouth daily. apixaban (ELIQUIS) 5 mg tablet  3/4/2019  --  --  Provider, Historical    Take 5 mg by mouth two (2) times a day. Indications: pulmonary thromboembolism    azaTHIOprine (IMURAN) 50 mg tablet  3/4/2019  11/03/17  --  Provider, Historical    Take 50 mg by mouth daily (after breakfast). carvedilol (COREG) 25 mg tablet  3/4/2019  08/19/18  -- Evette Higuera MD    Take 1 Tab by mouth two (2) times daily (with meals). ferric citrate (AURYXIA) 210 mg iron tablet  3/4/2019  --  --  Provider, Historical    Take 210 mg by mouth three (3) times daily (with meals). gemfibrozil (LOPID) 600 mg tablet  3/4/2019  11/03/17  --  Provider, Historical    Take 300 mg by mouth daily. hydroxychloroquine (PLAQUENIL) 200 mg tablet  3/4/2019  --  -- Darnell Mohan MD    Take 200 mg by mouth two (2) times a day.     ondansetron (ZOFRAN ODT) 8 mg disintegrating tablet  Unknown  11/16/18  -- Taqueria Ramesh MD    Take 1 Tab by mouth every eight (8) hours as needed for Nausea. oxyCODONE-acetaminophen (PERCOCET) 5-325 mg per tablet  3/4/2019  02/06/19  -- Ernesto Pierre MD    Take 1 Tab by mouth two (2) times daily as needed for Pain (moderate to severe  pain). Max Daily Amount: 2 Tabs. pantoprazole (PROTONIX) 40 mg tablet  3/4/2019  10/23/15  -- Isa Orellana MD    Take 1 Tab by mouth Daily (before breakfast). polyethylene glycol (MIRALAX) 17 gram packet  Unknown  --  --  Provider, Historical    Take 17 g by mouth daily as needed. predniSONE (DELTASONE) 5 mg tablet  3/4/2019  06/28/18  -- Colby COOPER MD    Take 2 Tabs by mouth daily. senna (SENNA) 8.6 mg tablet  Unknown  --  --  Provider, Historical    Take 1 Tab by mouth daily as needed for Constipation. for constipation       ALLERGIES:  TO COMPAZINE. SOCIAL HISTORY:  Negative for smoking. Negative for alcohol and illicit drugs. FAMILY HISTORY:  Diabetes, father and mother. Hypertension, father. Breast cancer, aunt. REVIEW OF SYSTEMS:  Pertinent positives as noted in HPI. All other systems are reviewed and negative. PHYSICAL EXAMINATION:  VITAL SIGNS:  Temperature is 98.1 degrees Fahrenheit, blood pressure 146/92, heart rate 89, respiratory rate of 16, O2 saturations 93% on room air. Recorded weight of 180 pounds (82 kg). Recorded height of 5 feet 5 inches tall. GENERAL:  The patient in no acute respiratory distress. PSYCH:  The patient is awake, alert, and oriented x3. NEUROLOGIC:  GCS is 15. Moves extremities x4. Sensation grossly intact without slurred speech or facial droop. HEENT:  Normocephalic, atraumatic. PERRLA. EOMs intact. Sclerae anicteric. Conjunctivae clear. Nares are patent. Pharynx:  Tongue is midline, non-edematous. NECK:  Supple without lymphadenopathy, JVD, carotid bruits, or thyromegaly.   LYMPH:  Negative cervical or supraclavicular adenopathy. RESPIRATORY:  Lungs clear to auscultation bilaterally. CVS/HEART:  Regular rate and rhythm. Normal S1, S2 without murmurs, rubs, or gallops. ABDOMEN:  Soft, generalized tenderness to palpation, mostly in the left lower quadrant without guarding. No rebound. No rigidity. No auscultated abdominal bruits. No palpable abdominal mass. BACK:  No CVA tenderness. No step-off deformity. MUSCULOSKELETAL:  No acute palpable bony deformity. Negative calf tenderness. VASCULAR:  2+ radial, 1+ dorsalis pedis pulses without cyanosis or clubbing. Negative for edema. Palpable thrill to right arm AV fistula. SKIN:  Warm and dry. LABORATORY DATA:  Reviewed and as follows:  Sodium of 138, potassium 6.6, chloride of 98, CO2 of 28, BUN of 77, creatinine of 9.77, glucose 84, anion gap of 12, calcium is 8.3, magnesium is 2.2, GFR of 6, total bilirubin is 0.4, total protein 8.3, albumin is 3.5, ALT of 12, AST of 32, alkaline phosphatase of 96, lipase is 174. WBC of 3.0, hemoglobin of 12.1, hematocrit of 38.7, platelets are 451, neutrophils of 65%. CT abdomen and pelvis with IV contrast, results are reviewed and noted in the HPI. 12-lead EKG, sinus rhythm, first-degree AV block, ST-changes in septal leads at 95 beats per minute. IMPRESSION AND PLAN:  1. Acute hyperkalemia. Plan is for the patient to receive hemodialysis tonight. Continue with monitoring. Repeat potassium level following hemodialysis treatment. Nephrologist had been consulted. 2.  Generalized abdominal pain. Plan is for the patient to be seen by Obstetrics and Gynecology in consultation regards to ultrasound findings with left ovarian lesion, possibly hemorrhagic cyst.  Advised pain management support (albeit withhold parameters). 3.  Hypoxia. The patient's oxygen levels have been low, so placed an order for supplemental oxygen, pulse oximetry monitoring. 4.  End-stage renal disease, hemodialysis. Plan as noted above.   5. Thrombocytopenia, mild. Repeat platelet count. 6.  Hypertension. Resume the patient back on home medications. 7.  Hyperlipidemia. Continue current home medicine. 8.  Nausea and vomiting. Ordered Zofran 4 mg IV q.6 hours p.r.n.  9.  Venous thromboembolism prophylaxis. Sequential compression devices to lower extremities.         Eliot Cooper MD [id number]  D:  03/04/2019 22:39  T:  03/05/2019 0:37  JOB #:  [id number]

## 2019-03-05 NOTE — PROGRESS NOTES
Care Management Interventions  PCP Verified by CM: Yes  Palliative Care Criteria Met (RRAT>21 & CHF Dx)?: No  Mode of Transport at Discharge: Other (see comment)(Family will tranport)  MyChart Signup: Yes  Discharge Durable Medical Equipment: No  Health Maintenance Reviewed: Yes  Physical Therapy Consult: No  Occupational Therapy Consult: No  Speech Therapy Consult: No  Current Support Network: Relative's Home  Confirm Follow Up Transport: Family  Plan discussed with Pt/Family/Caregiver: Yes  [place] Resource Information Provided?: No  Discharge Location  Discharge Placement: Home with family assistance    Reason for Admission:   Hyperkalemia               RRAT Score:     23             Resources/supports as identified by patient/family:  Her parents                    Top Challenges facing patient (as identified by patient/family and CM): Finances/Medication cost?      Patient listed as having Medicare and Medicaid. Transportation? Her parents provide transportation              Support system or lack thereof? See above. Living arrangements? Patient lives with her parents           Self-care/ADLs/Cognition? She is independent with ADLs prior to admission. Current Advanced Directive/Advance Care Plan:  Not on file, patient refused. Plan for utilizing home health:    Not appropriate at this time. Likelihood of readmission:  high                 Transition of Care Plan:                Chart reviewed for transitions of care, and discussed in rounds. CM met with patient at bedside to explain role and offer support. Patient is alert and oriented x4, and confirmed demographics. She lives with her parents who provide transportation. Her prescriptions are filled at the [facility] in [place]. Patient [facility] HD [facility] on hospitals. She has care aids in home 7 days a week through [place]. Patient requesting to have her follow-up appointments scheduled when ready for discharge, cm to send referral to cm specialist. CM will follow for any discharge needs.   Ernesto Dyson [facility]

## 2019-03-05 NOTE — PROGRESS NOTES
Patient transfer from [place] ED arrived by stretcher without any families or friends. Completing a head to toe assessment without any c/o pain and no distress noted. Will continue to monitor.

## 2019-03-05 NOTE — ED NOTES
Attempted to give report to Cb Cadena RN at [facility]. Was advised she will call back for report.

## 2019-03-06 ENCOUNTER — OFFICE VISIT (OUTPATIENT)
Dept: NEUROLOGY | Age: 34
End: 2019-03-06

## 2019-03-06 VITALS
WEIGHT: 179 LBS | BODY MASS INDEX: 29.82 KG/M2 | HEART RATE: 90 BPM | DIASTOLIC BLOOD PRESSURE: 98 MMHG | SYSTOLIC BLOOD PRESSURE: 143 MMHG | OXYGEN SATURATION: 93 % | TEMPERATURE: 98.2 F | HEIGHT: 65 IN | RESPIRATION RATE: 16 BRPM

## 2019-03-06 VITALS
HEIGHT: 65 IN | DIASTOLIC BLOOD PRESSURE: 80 MMHG | BODY MASS INDEX: 29.66 KG/M2 | WEIGHT: 178 LBS | HEART RATE: 84 BPM | RESPIRATION RATE: 20 BRPM | SYSTOLIC BLOOD PRESSURE: 134 MMHG | OXYGEN SATURATION: 98 %

## 2019-03-06 DIAGNOSIS — M54.50 CHRONIC BILATERAL LOW BACK PAIN WITHOUT SCIATICA: Primary | ICD-10-CM

## 2019-03-06 DIAGNOSIS — G89.29 CHRONIC BILATERAL LOW BACK PAIN WITHOUT SCIATICA: Primary | ICD-10-CM

## 2019-03-06 LAB
ALBUMIN SERPL-MCNC: 3.4 G/DL (ref 3.5–5)
ALBUMIN/GLOB SERPL: 0.8 {RATIO} (ref 1.1–2.2)
ALP SERPL-CCNC: 80 U/L (ref 45–117)
ALT SERPL-CCNC: 12 U/L (ref 12–78)
ANION GAP SERPL CALC-SCNC: 8 MMOL/L (ref 5–15)
AST SERPL-CCNC: 28 U/L (ref 15–37)
BACTERIA SPEC CULT: NORMAL
BACTERIA SPEC CULT: NORMAL
BILIRUB SERPL-MCNC: 0.4 MG/DL (ref 0.2–1)
BUN SERPL-MCNC: 32 MG/DL (ref 6–20)
BUN/CREAT SERPL: 6 (ref 12–20)
CALCIUM SERPL-MCNC: 7.8 MG/DL (ref 8.5–10.1)
CHLORIDE SERPL-SCNC: 99 MMOL/L (ref 97–108)
CO2 SERPL-SCNC: 29 MMOL/L (ref 21–32)
CREAT SERPL-MCNC: 5.18 MG/DL (ref 0.55–1.02)
ERYTHROCYTE [DISTWIDTH] IN BLOOD BY AUTOMATED COUNT: 14.8 % (ref 11.5–14.5)
GLOBULIN SER CALC-MCNC: 4.4 G/DL (ref 2–4)
GLUCOSE SERPL-MCNC: 89 MG/DL (ref 65–100)
HCT VFR BLD AUTO: 35.5 % (ref 35–47)
HGB BLD-MCNC: 10.9 G/DL (ref 11.5–16)
MAGNESIUM SERPL-MCNC: 2.3 MG/DL (ref 1.6–2.4)
MCH RBC QN AUTO: 27.8 PG (ref 26–34)
MCHC RBC AUTO-ENTMCNC: 30.7 G/DL (ref 30–36.5)
MCV RBC AUTO: 90.6 FL (ref 80–99)
NRBC # BLD: 0 K/UL (ref 0–0.01)
NRBC BLD-RTO: 0 PER 100 WBC
PHOSPHATE SERPL-MCNC: 5.1 MG/DL (ref 2.6–4.7)
PLATELET # BLD AUTO: 120 K/UL (ref 150–400)
PMV BLD AUTO: 11.2 FL (ref 8.9–12.9)
POTASSIUM SERPL-SCNC: 4.5 MMOL/L (ref 3.5–5.1)
PROT SERPL-MCNC: 7.8 G/DL (ref 6.4–8.2)
RBC # BLD AUTO: 3.92 M/UL (ref 3.8–5.2)
SERVICE CMNT-IMP: NORMAL
SODIUM SERPL-SCNC: 136 MMOL/L (ref 136–145)
WBC # BLD AUTO: 2.1 K/UL (ref 3.6–11)

## 2019-03-06 PROCEDURE — 36415 COLL VENOUS BLD VENIPUNCTURE: CPT

## 2019-03-06 PROCEDURE — 74011250636 HC RX REV CODE- 250/636: Performed by: FAMILY MEDICINE

## 2019-03-06 PROCEDURE — 96376 TX/PRO/DX INJ SAME DRUG ADON: CPT

## 2019-03-06 PROCEDURE — 74011636637 HC RX REV CODE- 636/637: Performed by: FAMILY MEDICINE

## 2019-03-06 PROCEDURE — 99218 HC RM OBSERVATION: CPT

## 2019-03-06 PROCEDURE — 84100 ASSAY OF PHOSPHORUS: CPT

## 2019-03-06 PROCEDURE — 85027 COMPLETE CBC AUTOMATED: CPT

## 2019-03-06 PROCEDURE — 80053 COMPREHEN METABOLIC PANEL: CPT

## 2019-03-06 PROCEDURE — A9270 NON-COVERED ITEM OR SERVICE: HCPCS | Performed by: FAMILY MEDICINE

## 2019-03-06 PROCEDURE — 74011250637 HC RX REV CODE- 250/637: Performed by: FAMILY MEDICINE

## 2019-03-06 PROCEDURE — 83735 ASSAY OF MAGNESIUM: CPT

## 2019-03-06 RX ORDER — OXYCODONE AND ACETAMINOPHEN 5; 325 MG/1; MG/1
1 TABLET ORAL
Qty: 12 TAB | Refills: 0 | Status: SHIPPED | OUTPATIENT
Start: 2019-03-06 | End: 2019-03-06

## 2019-03-06 RX ORDER — OXYCODONE AND ACETAMINOPHEN 5; 325 MG/1; MG/1
1 TABLET ORAL
Qty: 50 TAB | Refills: 0 | Status: SHIPPED | OUTPATIENT
Start: 2019-03-06 | End: 2019-04-03 | Stop reason: SDUPTHER

## 2019-03-06 RX ORDER — MORPHINE SULFATE 2 MG/ML
1 INJECTION, SOLUTION INTRAMUSCULAR; INTRAVENOUS
Status: DISCONTINUED | OUTPATIENT
Start: 2019-03-06 | End: 2019-03-06

## 2019-03-06 RX ORDER — OXYCODONE AND ACETAMINOPHEN 5; 325 MG/1; MG/1
1 TABLET ORAL
Status: DISCONTINUED | OUTPATIENT
Start: 2019-03-06 | End: 2019-03-06 | Stop reason: HOSPADM

## 2019-03-06 RX ADMIN — PREDNISONE 10 MG: 10 TABLET ORAL at 09:27

## 2019-03-06 RX ADMIN — MORPHINE SULFATE 1 MG: 2 INJECTION, SOLUTION INTRAMUSCULAR; INTRAVENOUS at 06:55

## 2019-03-06 RX ADMIN — GEMFIBROZIL 300 MG: 600 TABLET ORAL at 09:00

## 2019-03-06 RX ADMIN — HYDROXYCHLOROQUINE SULFATE 200 MG: 200 TABLET, FILM COATED ORAL at 09:27

## 2019-03-06 RX ADMIN — Medication 10 ML: at 07:00

## 2019-03-06 RX ADMIN — FERRIC CITRATE 210 MG: 210 TABLET, COATED ORAL at 09:29

## 2019-03-06 RX ADMIN — AZATHIOPRINE 50 MG: 50 TABLET ORAL at 09:28

## 2019-03-06 RX ADMIN — CARVEDILOL 25 MG: 12.5 TABLET, FILM COATED ORAL at 09:27

## 2019-03-06 RX ADMIN — PANTOPRAZOLE SODIUM 40 MG: 40 TABLET, DELAYED RELEASE ORAL at 06:54

## 2019-03-06 NOTE — PROGRESS NOTES
CM noted of patient discharge today. [facility] to [facility], [phone]. Patient did not have any other discharge needs. Her family will provide transportation via private care. CM Specialist to schedule follow-up appointments.     Myles Cuellar, [facility]

## 2019-03-06 NOTE — PROGRESS NOTES
Patient has been resting in bed majority of night, dialysis finished around 2130. BP were elevated during. Post IHD bp 169/117, Dr. Zion De La Cruz said to reschedule the Norvasc 10mg to be given today instead of tomorrow. Patient food was heated, up at [place], SR on tele, RA afebrile, anuric. Call light within reach, will ctm    2309: bp came down to 141/101 will ctm    Bedside and Verbal shift change report given to [address] [employer] (oncoming nurse) by Gina De La Garza (offgoing nurse).  Report included the following information SBAR, Kardex, MAR, Med Rec Status and Cardiac Rhythm SR.

## 2019-03-06 NOTE — DISCHARGE SUMMARY
Discharge Summary       PATIENT ID: Sarthak Glover  MRN: [id number]   YOB: 1985    DATE OF ADMISSION: 3/4/2019  5:06 PM    DATE OF DISCHARGE: 3/6/19  PRIMARY CARE PROVIDER: Marlin Urena NP     ATTENDING PHYSICIAN:   DISCHARGING PROVIDER: Diony Jeffers MD    To contact this individual call [phone] and ask the  to page. If unavailable ask to be transferred the Adult Hospitalist Department. CONSULTATIONS: IP CONSULT TO HOSPITALIST  IP CONSULT TO NEPHROLOGY  IP CONSULT TO NEPHROLOGY  IP CONSULT TO OB GYN    PROCEDURES/SURGERIES: * No surgery found *    ADMITTING DIAGNOSES & HOSPITAL COURSE:   36 y.o. F with PMH of Lupus ,chornic anemia,asthma ,chronic pain,h/o PE on eliquis,ESRD on HD here with c/o lower abdominal pain x 2 - 3 days that is worsening. Delia Mom has h/o peritoneal dialysis with infection and says this feels similar. , now gets HD due on 3/5/19. She went to [facility] had a CT of the abdomen and pelvis with IV contrast, showed bile obstruction with a 6.4 cm left ovarian lesion, felt most likely hemorrhagic cyst.,labs showed hyperkalemia    Course per problem list with management    Generalized abdominal pain.   -OB/GYN  consulted,regards to CT showing Lt ovarian cyst, possibly  hemorrhagic cyst.    -GYN recommended OP f/u with US monitoring,anticipate spontaneous resolution,hope to avoid surgery in light of complex co morbidities,immunosupression. Pt is high risk for adhesion formation  -pain management with tylenol & percocet  -OP f/u with primary GYN Dr Shailesh Howard      Acute hyperkalemia. resolved  ESRD- TTS   -s/p HD 03/04 x 2 hrs  - repeat  HD 03/5 to keep her on schedule  -repeat potassium levels returned to normal  -Nephrology consulted for HD management , ok to discharge today  . Acute Hypoxia. resolved  -likely d/t ESRD   -initially required,supp O2,trasitioned to   RA   -cxr with no acute proscess  -has h/o mild intermittent asthma,stable currently        End-stage renal disease, hemodialysis.  Plan as noted above.     Thrombocytopenia/neutropenia, monitor  -pt is on chronic steroids and Imuran for SLE, possibly contributing     Hypertension.  Resume the patient back on home medications.      Hyperlipidemia.  Continue current home medicine.     Nausea and vomiting. D/t ESRD resolved on zofran     H/o PE, resume  eliquis     Chronic anemia due to CKD  -h/h stable             Code status: full  DVT prophylaxis: scd,holding  eliquis             FOLLOW UP APPOINTMENTS:    Follow-up Information     Follow up With Specialties Details Why Contact Info    Tori Lemon MD Obstetrics & Gynecology, Gynecology, Obstetrics Schedule an appointment as soon as possible for a visit today  [address] [facility] [address] [facility] [address] [place] [address]  [phone]      Luke Khan NP Nurse Practitioner Schedule an appointment as soon as possible for a visit  [address] [phone]               DIET: Renal Diet      ACTIVITY: Activity as tolerated          DISCHARGE MEDICATIONS:  Current Discharge Medication List      CONTINUE these medications which have CHANGED    Details   oxyCODONE-acetaminophen (PERCOCET) 5-325 mg per tablet Take 1 Tab by mouth every six (6) hours as needed (1) for up to 3 days. Max Daily Amount: 4 Tabs. Qty: 12 Tab, Refills: 0    Associated Diagnoses: Cyst of left ovary         CONTINUE these medications which have NOT CHANGED    Details   amitriptyline (ELAVIL) 50 mg tablet Take 1 Tab by mouth nightly. Qty: 30 Tab, Refills: 5      acetaminophen (TYLENOL EXTRA STRENGTH) 500 mg tablet Take 2 Tabs by mouth every six (6) hours as needed for Pain. Qty: 30 Tab, Refills: 0      ferric citrate (AURYXIA) 210 mg iron tablet Take 210 mg by mouth three (3) times daily (with meals). carvedilol (COREG) 25 mg tablet Take 1 Tab by mouth two (2) times daily (with meals).   Qty: 60 Tab, Refills: 0      amLODIPine (NORVASC) 10 mg tablet Take 1 Tab by mouth daily. Qty: 30 Tab, Refills: 0      apixaban (ELIQUIS) 5 mg tablet Take 5 mg by mouth two (2) times a day. Indications: pulmonary thromboembolism      predniSONE (DELTASONE) 5 mg tablet Take 2 Tabs by mouth daily. Qty: 30 Tab, Refills: 0      gemfibrozil (LOPID) 600 mg tablet Take 300 mg by mouth daily. azaTHIOprine (IMURAN) 50 mg tablet Take 50 mg by mouth daily (after breakfast). albuterol (PROVENTIL HFA, VENTOLIN HFA, PROAIR HFA) 90 mcg/actuation inhaler Take 1-2 Puffs by inhalation every four (4) hours as needed for Wheezing or Shortness of Breath. Qty: 1 Inhaler, Refills: 2      hydroxychloroquine (PLAQUENIL) 200 mg tablet Take 200 mg by mouth two (2) times a day. pantoprazole (PROTONIX) 40 mg tablet Take 1 Tab by mouth Daily (before breakfast). Qty: 30 Tab, Refills: 0      ondansetron (ZOFRAN ODT) 8 mg disintegrating tablet Take 1 Tab by mouth every eight (8) hours as needed for Nausea. Qty: 15 Tab, Refills: 0      polyethylene glycol (MIRALAX) 17 gram packet Take 17 g by mouth daily as needed. senna (SENNA) 8.6 mg tablet Take 1 Tab by mouth daily as needed for Constipation. for constipation               NOTIFY YOUR PHYSICIAN FOR ANY OF THE FOLLOWING:   Fever over 101 degrees for 24 hours. Chest pain, shortness of breath, fever, chills, nausea, vomiting, diarrhea, change in mentation, falling, weakness, bleeding. Severe pain or pain not relieved by medications. Or, any other signs or symptoms that you may have questions about.     DISPOSITION:    Home With:family   OT  PT  HH  RN       Long term SNF/Inpatient Rehab    Independent/assisted living    Hospice    Other:       PATIENT CONDITION AT DISCHARGE:     Functional status    Poor     Deconditioned    x Independent      Cognition   x  Lucid     Forgetful     Dementia      Catheters/lines (plus indication)    Franco     PICC     PEG     Dialysis catheter     Code status     Full code     DNR      PHYSICAL EXAMINATION AT DISCHARGE:   BP (!) 143/98 (BP 1 Location: Left arm, BP Patient Position: At rest;Sitting)   Pulse 90   Temp 98.2 °F (36.8 °C)   Resp 16   Ht 5' 5\" (1.651 m)   Wt 81.2 kg (179 lb)   LMP 03/01/2019   SpO2 93%   Breastfeeding?  No   BMI 29.79 kg/m²   Gen:  Well-developed, well-nourished, in no acute distress  HEENT:  Pink conjunctivae, PERRL, hearing intact to voice, moist mucous membranes  Neck:  Supple, without masses, thyroid non-tender  Resp:  No accessory muscle use, clear breath sounds without wheezes rales or rhonchi  Card:  No murmurs, normal S1, S2 without thrills, bruits or peripheral edema  Abd:  Soft, non-tender, non-distended, normoactive bowel sounds are present, no palpable masses  Musc:  No cyanosis or clubbing  Skin:  No rashes or ulcers, skin turgor is good  Neuro:  Cranial nerves 3-12 are grossly intact,  strength is 5/5  is 5/5 bilaterally, follows commands appropriately  Psych:  Good insight, oriented to person, place and time, alert        CHRONIC MEDICAL DIAGNOSES:  Problem List as of 3/6/2019 Date Reviewed: 3/4/2019          Codes Class Noted - Resolved    * (Principal) Hyperkalemia ICD-10-CM: E87.5  ICD-9-CM: 276.7  3/4/2019 - Present        ESRD on hemodialysis ([facility] ICD-10-CM: N18.6, Z99.2  ICD-9-CM: 585.6, V45.11  8/27/2018 - Present        SOB (shortness of breath) ICD-10-CM: R06.02  ICD-9-CM: 786.05  8/15/2018 - Present        Rib pain on right side ICD-10-CM: R07.81  ICD-9-CM: 786.50  7/2/2018 - Present        Troponin level elevated ICD-10-CM: R74.8  ICD-9-CM: 790.6  6/17/2018 - Present        Intra-abdominal abscess ([facility] ICD-10-CM: K65.1  ICD-9-CM: 567.22  5/12/2018 - Present        Sepsis ([address] ICD-10-CM: A41.9  ICD-9-CM: 038.9, 995.91  4/29/2018 - Present        Generalized abdominal pain ICD-10-CM: R10.84  ICD-9-CM: 789.07  4/4/2018 - Present        Other constipation ICD-10-CM: K59.09  ICD-9-CM: 564.09  4/4/2018 - Present        Other ascites ICD-10-CM: R18.8  ICD-9-CM: 789.59  4/4/2018 - Present        Peritonitis ([facility] ICD-10-CM: K65.9  ICD-9-CM: 567.9  3/11/2018 - Present        History of pulmonary embolism ICD-10-CM: N49.549  ICD-9-CM: V12.55  12/8/2017 - Present        Hypomagnesemia ICD-10-CM: E83.42  ICD-9-CM: 275.2  10/30/2017 - Present        Hypocalcemia ICD-10-CM: E83.51  ICD-9-CM: 275.41  10/30/2017 - Present        Hypokalemia ICD-10-CM: E87.6  ICD-9-CM: 276.8  10/27/2017 - Present        Hypertension (Chronic) ICD-10-CM: I10  ICD-9-CM: 401.9  10/14/2017 - Present        Chronic bilateral low back pain without sciatica ICD-10-CM: M54.5, G89.29  ICD-9-CM: 724.2, 338.29  5/26/2017 - Present        Anemia of renal disease ICD-10-CM: D63.1  ICD-9-CM: 285.21  2/21/2017 - Present        Dependence on peritoneal dialysis [facility]) ICD-10-CM: Z99.2  ICD-9-CM: V45.11  2/21/2017 - Present        ACP (advance care planning) ICD-10-CM: Z71.89  ICD-9-CM: V65.49  2/21/2017 - Present        Malignant hypertension ICD-10-CM: I10  ICD-9-CM: 401.0  7/11/2016 - Present        Encounter for monitoring opioid maintenance therapy ICD-10-CM: Z51.81, Z79.891  ICD-9-CM: V58.83, V58.69  11/3/2015 - Present        Lupus nephritis ([facility] ICD-10-CM: M32.14  ICD-9-CM: 710.0, 583.81  3/10/2012 - Present        Lupus ICD-10-CM: L93.0  ICD-9-CM: 695.4  2/27/2012 - Present        RESOLVED: Neutropenia ([facility] ICD-10-CM: D70.9  ICD-9-CM: 288.00  9/15/2018 - 9/19/2018        RESOLVED: Anemia in chronic kidney disease ICD-10-CM: N18.9, D63.1  ICD-9-CM: 285.21  9/15/2018 - 9/19/2018        RESOLVED: Malignant hypertensive urgency ICD-10-CM: I16.0  ICD-9-CM: 401.0  9/10/2018 - 9/19/2018        RESOLVED: Hypertensive urgency ICD-10-CM: I16.0  ICD-9-CM: 401.9  7/19/2018 - 7/22/2018        RESOLVED: Sepsis ([facility] ICD-10-CM: A41.9  ICD-9-CM: 038.9, 995.91  12/12/2017 - 12/22/2017        RESOLVED: Pneumonia ICD-10-CM: J18.9  ICD-9-CM: 781  10/14/2017 - 12/8/2017        RESOLVED: Pleural effusion, left ICD-10-CM: J90  ICD-9-CM: 511.9  10/14/2017 - 12/8/2017        RESOLVED: ESRD on peritoneal dialysis [facility]) (Chronic) ICD-10-CM: N18.6, Z99.2  ICD-9-CM: 585.6, V45.11  10/7/2016 - 8/27/2018        RESOLVED: Idiopathic chronic inflammatory bowel disease ICD-10-CM: K63.89  ICD-9-CM: 558.9  8/28/2013 - 8/28/2013        RESOLVED: Abdominal pain ICD-10-CM: R10.9  ICD-9-CM: 789.00  8/28/2013 - 9/3/2013        RESOLVED: Hypoxia ICD-10-CM: R09.02  ICD-9-CM: 799.02  4/25/2013 - 4/30/2013        RESOLVED: Lupus nephritis ([facility] ICD-10-CM: M32.14  ICD-9-CM: 710.0, 583.81  4/27/2012 - 4/28/2012              Greater than 35 minutes were spent with the patient on counseling and coordination of care    Signed:   Diony Jeffers MD  3/6/2019  9:36 AM

## 2019-03-06 NOTE — PROGRESS NOTES
1955: Bedside shift change report given to Emilie Medina RN (oncoming nurse) by Clarice Gonzalez RN (offgoing nurse). Report included the following information SBAR, Kardex, Intake/Output, MAR, Accordion and Recent Results.

## 2019-03-06 NOTE — PROGRESS NOTES
Hospital follow-up PCP transitional care appointment has been scheduled with Dr. Aracelis Alvarez for Wednesday, 3/13/19 at 2:15 p.m. Pending patient discharge.   Darnell Stout, Care Management Specialist.

## 2019-03-06 NOTE — DISCHARGE INSTRUCTIONS
DIET; RENAL  ACTIVITY ; AS TOLERATED    NOTIFY YOUR PHYSICIAN FOR ANY OF THE FOLLOWING:   Fever over 101 degrees for 24 hours. Chest pain, shortness of breath, fever, chills, nausea, vomiting, diarrhea, change in mentation, falling, weakness, bleeding. Severe pain or pain not relieved by medications.   Or, any other signs or symptoms that you may have questions abo

## 2019-03-06 NOTE — PROGRESS NOTES
Interval HPI:     This is a 36 y.o. female who is following up for     PMHx: chronic back pain, hx of fibromyalgia, hx of DVT/ coumadin, hx of Lupus, mild lower lumbar disc degeneration (L4-5 and L5-S1 with small annular tear at L5-S1)    Chief Complaint   Patient presents with    Pain (Chronic)       Was recently discharged from [facility] after evaluation of abdominal pain. Discharge summary states she has ovarian cyst, and going to be followed up by Ob-Gyn. No significant change in moderate lower back pain. Continues taking Percocet 5/ 325 one tab BID. Has started doing some stretching exercises at home. She continues to agree with plan to gradually taper off opioid pain meds and increase physical activity, stretching for low back pain. Tried/ failed: Gabapentin, Cymbalta, Amitriptyline (only helped get to sleep), Lyrica (abnormal behaviors), Hydrocodone (\"too strong\")    Reviewed : no abberant behaviors  Pill count: 0 percocet tablet left  Last filled on 2-6-19 (#55 tabs)     UDS Hx:  March 2017: consistent with Rx pain medication  9-13-17 UDS: only screened for codeine or morphine, which pt doesn't take  1-3-18: serum Drug screen: consistent with Rx pain medication (oxycodone)  11-19-18: serum drug screen; consistent with Rx pain medication (oxycodone)      Brief ROS: as above      Past Medical History:   Diagnosis Date    Anemia     secondary to lupus    Asthma     no inhaler use in past 2 to 3 years    Carditis     Chronic kidney disease     ESRD    Chronic pain     DDD (degenerative disc disease), lumbar     [facility] [facility]     GERD (gastroesophageal reflux disease)     Hemodialysis patient ([facility] [address] 12/21/2017    [address]  Tuesday,  Thursday,  and Saturday.     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  Poor historian 2018    With medications    Thromboembolus ([facility] [address] 2013    lungs    Transfusion history     Last Transfusion 2017  at [facility]       Past Surgical History:   Procedure Laterality Date    HX  SECTION  11/2006    x1    HX OTHER SURGICAL  9/16/15    INSERTION PD CATH; Removed 2017    HX VASCULAR ACCESS Right 2017    Double-Lumen henry catheter upper chest    HX VASCULAR ACCESS Right 2018    right upper arm       Family History   Problem Relation Age of Onset    Diabetes Father     Hypertension Father     Cancer Other         aunt with breast cancer    Diabetes Mother        Social History     Socioeconomic History    Marital status: SINGLE     Spouse name: Not on file    Number of children: Not on file    Years of education: Not on file    Highest education level: Not on file   Social Needs    Financial resource strain: Not on file    Food insecurity - worry: Not on file    Food insecurity - inability: Not on file   C[employer] needs - medical: Not on file   C[employer] needs - non-medical: Not on file   Occupational History    Not on file   Tobacco Use    Smoking status: Never Smoker    Smokeless tobacco: Never Used   Substance and Sexual Activity    Alcohol use: No    Drug use: Yes     Types: Prescription, OTC    Sexual activity: Not Currently   Other Topics Concern     Service No    Blood Transfusions No    Caffeine Concern No    Occupational Exposure No    Hobby Hazards No    Sleep Concern No    Stress Concern No    Weight Concern No    Special Diet No    Back Care Yes     Comment: low back pain    Exercise No    Bike Helmet No    Seat Belt Yes    Self-Exams No   Social History Narrative    Lives with parents and daughter. Allergies   Allergen Reactions    Compazine [Prochlorperazine Edisylate] Nausea and Vomiting and Palpitations    Compazine [Prochlorperazine] Other (comments)     Hot and lightheaded. Current Outpatient Medications   Medication Sig Dispense Refill    oxyCODONE-acetaminophen (PERCOCET) 5-325 mg per tablet Take 1 Tab by mouth two (2) times daily as needed for Pain (severe back pain) for up to 30 days. Max Daily Amount: 2 Tabs. 50 Tab 0    amitriptyline (ELAVIL) 50 mg tablet Take 1 Tab by mouth nightly. 30 Tab 5    ondansetron (ZOFRAN ODT) 8 mg disintegrating tablet Take 1 Tab by mouth every eight (8) hours as needed for Nausea. 15 Tab 0    acetaminophen (TYLENOL EXTRA STRENGTH) 500 mg tablet Take 2 Tabs by mouth every six (6) hours as needed for Pain. 30 Tab 0    ferric citrate (AURYXIA) 210 mg iron tablet Take 210 mg by mouth three (3) times daily (with meals).  carvedilol (COREG) 25 mg tablet Take 1 Tab by mouth two (2) times daily (with meals). 60 Tab 0    amLODIPine (NORVASC) 10 mg tablet Take 1 Tab by mouth daily. 30 Tab 0    apixaban (ELIQUIS) 5 mg tablet Take 5 mg by mouth two (2) times a day. Indications: pulmonary thromboembolism      polyethylene glycol (MIRALAX) 17 gram packet Take 17 g by mouth daily as needed.  predniSONE (DELTASONE) 5 mg tablet Take 2 Tabs by mouth daily. 30 Tab 0    senna (SENNA) 8.6 mg tablet Take 1 Tab by mouth daily as needed for Constipation. for constipation      gemfibrozil (LOPID) 600 mg tablet Take 300 mg by mouth daily.  azaTHIOprine (IMURAN) 50 mg tablet Take 50 mg by mouth daily (after breakfast).  albuterol (PROVENTIL HFA, VENTOLIN HFA, PROAIR HFA) 90 mcg/actuation inhaler Take 1-2 Puffs by inhalation every four (4) hours as needed for Wheezing or Shortness of Breath. 1 Inhaler 2    hydroxychloroquine (PLAQUENIL) 200 mg tablet Take 200 mg by mouth two (2) times a day.       pantoprazole (PROTONIX) 40 mg tablet Take 1 Tab by mouth Daily (before breakfast). 30 Tab 0       Physical Exam  Vitals:    03/06/19 1300   BP: 134/80   Pulse: 84   Resp: 20   SpO2: 98%   Weight: 80.7 kg (178 lb)   Height: 5' 5\" (1.651 m)       Awake, alert, conversant  Neck: supple  Ext: dialysis fistula in right forearm    MSK:  Lumbar spine:  Mild pain with back extension > flexion       Focused Neurological Exam     Mental status:   Alert and oriented to person, place situation  Mood appears stable  Normal thought processes    CNs: EOMI, Face symmetric, Hearing/ Language normal  Sensory: intact light touch  Motor: 4/ 5 strength in arms and legs    Reflexes: not examined today  Gait: normal    Impression    ICD-10-CM ICD-9-CM    1. Chronic bilateral low back pain without sciatica M54.5 724.2 oxyCODONE-acetaminophen (PERCOCET) 5-325 mg per tablet    G89.29 338.29         1) Chronic bilateral lower back pain without sciatica    Continue Percocet 5/ 325 one tab BID prn severe back pain. Reduced # of Percocet tabs to #50 for this month. Will gradually keep lowering # of tablets. Follow up in 4 weeks.      Signed By: Farzad Becker MD     March 6, 2019

## 2019-03-07 ENCOUNTER — PATIENT OUTREACH (OUTPATIENT)
Dept: FAMILY MEDICINE CLINIC | Age: 34
End: 2019-03-07

## 2019-03-31 ENCOUNTER — HOSPITAL ENCOUNTER (EMERGENCY)
Age: 34
Discharge: HOME | End: 2019-03-31
Attending: EMERGENCY MEDICINE
Payer: MEDICARE

## 2019-03-31 VITALS
BODY MASS INDEX: 31.33 KG/M2 | HEIGHT: 65 IN | HEART RATE: 82 BPM | WEIGHT: 188.05 LBS | SYSTOLIC BLOOD PRESSURE: 104 MMHG | OXYGEN SATURATION: 99 % | TEMPERATURE: 97.9 F | DIASTOLIC BLOOD PRESSURE: 67 MMHG | RESPIRATION RATE: 16 BRPM

## 2019-03-31 DIAGNOSIS — R30.0 DYSURIA: Primary | ICD-10-CM

## 2019-03-31 LAB
APPEARANCE UR: CLEAR
BACTERIA URNS QL MICRO: NEGATIVE /HPF
BILIRUB UR QL: NEGATIVE
COLOR UR: ABNORMAL
EPITH CASTS URNS QL MICRO: ABNORMAL /LPF
GLUCOSE UR STRIP.AUTO-MCNC: NEGATIVE MG/DL
HCG UR QL: NEGATIVE
HGB UR QL STRIP: NEGATIVE
KETONES UR QL STRIP.AUTO: NEGATIVE MG/DL
LEUKOCYTE ESTERASE UR QL STRIP.AUTO: NEGATIVE
NITRITE UR QL STRIP.AUTO: NEGATIVE
PH UR STRIP: 8.5 [PH] (ref 5–8)
PROT UR STRIP-MCNC: 100 MG/DL
RBC #/AREA URNS HPF: ABNORMAL /HPF (ref 0–5)
SP GR UR REFRACTOMETRY: 1.01 (ref 1–1.03)
UROBILINOGEN UR QL STRIP.AUTO: 0.2 EU/DL (ref 0.2–1)
WBC URNS QL MICRO: ABNORMAL /HPF (ref 0–4)

## 2019-03-31 PROCEDURE — 81001 URINALYSIS AUTO W/SCOPE: CPT

## 2019-03-31 PROCEDURE — 81025 URINE PREGNANCY TEST: CPT

## 2019-03-31 PROCEDURE — 99283 EMERGENCY DEPT VISIT LOW MDM: CPT

## 2019-03-31 PROCEDURE — 77030011943

## 2019-03-31 PROCEDURE — 51701 INSERT BLADDER CATHETER: CPT

## 2019-03-31 NOTE — ED NOTES
Patient ambulatory to bathroom, will attempt a urine sample, but states \"she does not feel like she needs to go. \"

## 2019-03-31 NOTE — ED TRIAGE NOTES
Triage Note: Patient arrives to ER complaining of urinary pain since Friday. Also complains of frequency.

## 2019-03-31 NOTE — ED NOTES
Patient back in room with empty urine cup. States she was unable to urinate. Provided with multiple bottles of water. Pt inquiring of other test that can be done to diagnose UTI, RN informed her that urine sample is necessary for diagnosis.

## 2019-03-31 NOTE — ED PROVIDER NOTES
28-year-old female with a history of lupus, HTN, HLD, ESRD on HD, chronic pain, who still urinates one to 2 times a day presents to the emergency department noting dysuria, urgency, frequency since Friday. She also notes some suprapubic abdominal pain that is mild when she urinates, but denies any nausea, vomiting, diarrhea, fever, chills. She notes a history of prior urinary tract infections and states this feels similar to her previous episodes. She also states that she has chronic lower abdominal pain, but it is worse than it normally is now. She denies any flank pain or blood in her urine. Denies any history of prior kidney stones. LMP was last week and was normal for her. Denies any further vaginal complaints. Past Medical History:   Diagnosis Date    Anemia     secondary to lupus    Asthma     no inhaler use in past 2 to 3 years    Carditis     Chronic kidney disease     ESRD    Chronic pain     DDD (degenerative disc disease), lumbar     ESRD (end stage renal disease) (HCC)     GERD (gastroesophageal reflux disease)     Hemodialysis patient ([facility] [address] 2017    [address]  Tuesday,  Thursday,  and Saturday. 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  Poor historian 2018    With medications    Thromboembolus ([facility] [address] 2013    lungs    Transfusion history     Last Transfusion 2017  at [facility]       Past Surgical History:   Procedure Laterality Date    HX  SECTION  11/2006    x1    HX OTHER SURGICAL  9/16/15    INSERTION PD CATH;  Removed 2017    HX VASCULAR ACCESS Right 2017    Double-Lumen henry catheter upper chest    HX VASCULAR ACCESS Right 2018    right upper arm         Family History:   Problem Relation Age of Onset    Diabetes Father     Hypertension Father     Cancer Other         aunt with breast cancer    Diabetes Mother        Social History     Socioeconomic History    Marital status: SINGLE     Spouse name: Not on file    Number of children: Not on file    Years of education: Not on file    Highest education level: Not on file   Occupational History    Not on file   Social Needs    Financial resource strain: Not on file    Food insecurity:     Worry: Not on file     Inability: Not on file    Transportation needs:     Medical: Not on file     Non-medical: Not on file   Tobacco Use    Smoking status: Never Smoker    Smokeless tobacco: Never Used   Substance and Sexual Activity    Alcohol use: No    Drug use: Yes     Types: Prescription, OTC    Sexual activity: Not Currently   Lifestyle    Physical activity:     Days per week: Not on file     Minutes per session: Not on file    Stress: Not on file   Relationships    Social connections:     Talks on phone: Not on file     Gets together: Not on file     Attends Yazdanism service: Not on file     Active member of club or organization: Not on file     Attends meetings of clubs or organizations: Not on file     Relationship status: Not on file    Intimate partner violence:     Fear of current or ex partner: Not on file     Emotionally abused: Not on file     Physically abused: Not on file     Forced sexual activity: Not on file   Other Topics Concern     Service No    Blood Transfusions No    Caffeine Concern No    Occupational Exposure No    Hobby Hazards No    Sleep Concern No    Stress Concern No    Weight Concern No    Special Diet No    Back Care Yes     Comment: low back pain    Exercise No    Bike Helmet No    Seat Belt Yes    Self-Exams No   Social History Narrative    Lives with parents and daughter.          ALLERGIES: Compazine [prochlorperazine edisylate] and Compazine [prochlorperazine]    Review of Systems   Constitutional: Negative for activity change, appetite change, chills and fever. HENT: Negative for congestion, rhinorrhea, sinus pressure, sneezing and sore throat. Eyes: Negative for photophobia and visual disturbance. Respiratory: Negative for cough and shortness of breath. Cardiovascular: Negative for chest pain. Gastrointestinal: Negative for abdominal pain, blood in stool, constipation, diarrhea, nausea and vomiting. Genitourinary: Positive for difficulty urinating, dysuria, frequency and urgency. Negative for flank pain, hematuria, menstrual problem, vaginal bleeding and vaginal discharge. Musculoskeletal: Negative for arthralgias, back pain, myalgias and neck pain. Skin: Negative for rash and wound. Neurological: Negative for syncope, weakness, numbness and headaches. Psychiatric/Behavioral: Negative for self-injury and suicidal ideas. All other systems reviewed and are negative. Vitals:    03/31/19 1518 03/31/19 1519   BP:  124/81   Pulse:  100   Resp:  18   Temp:  98.3 °F (36.8 °C)   SpO2:  97%   Weight: 85.3 kg (188 lb 0.8 oz)    Height: 5' 5\" (1.651 m)             Physical Exam   Constitutional: She is oriented to person, place, and time. She appears well-developed and well-nourished. No distress. HENT:   Head: Normocephalic and atraumatic. Nose: Nose normal.   Mouth/Throat: Oropharynx is clear and moist.   Eyes: Pupils are equal, round, and reactive to light. Conjunctivae and EOM are normal.   Neck: Neck supple. Cardiovascular: Normal rate, regular rhythm, normal heart sounds and intact distal pulses. Pulmonary/Chest: Effort normal and breath sounds normal.   Abdominal: Soft. She exhibits no distension. There is tenderness (mild) in the suprapubic area. There is no rigidity, no rebound, no guarding, no CVA tenderness, no tenderness at McBurney's point and negative Guadarrama's sign. Musculoskeletal: She exhibits no edema or tenderness. Neurological: She is alert and oriented to person, place, and time. She has normal strength. No cranial nerve deficit or sensory deficit. Coordination normal. GCS eye subscore is 4. GCS verbal subscore is 5. GCS motor subscore is 6. Skin: Skin is warm and dry. She is not diaphoretic. Nursing note and vitals reviewed. MDM    36 y.o. female presents with dysuria, difficulty was had in obtaining a urine sample because she states that she does not feel like she needs to go right now. Was given PO fluids and tried again to no avail. Straight cath was done and urine returned negative for any evidence of infection. Patient appears well, nontoxic, pleasant playing video games on her phone with stable VS and afebrile. She has mild discomfort in her suprapubic abd which is reportedly acute on chronic with otherwise soft and nontender abdomen, no CVA tenderness. Perhaps dysuria is due to significantly decreased frequency of urination as she is now on dialysis, or perhaps chronic abd pain. Records were reviewed and found a recent CT abd/plv on 3/4 which showed a hemorrhagic cyst on her left ovary. This could likely be the cause of her sx. Denies any vaginal sx at this time, do not feel pelvic exam is necessary.  rec'd f/u with her PCP and OB for further evaluation of her sx and for outpatient imaging to monitor for resolution of her hemorrhagic cyst.     Procedures

## 2019-03-31 NOTE — ED NOTES
Patient was discharged and given instructions by Dr. Jesse Sellers. Patient verbalized good understanding of all discharge instructions, prescriptions and f/u care. All questions answered. Pt in stable condition on discharge.

## 2019-04-03 ENCOUNTER — OFFICE VISIT (OUTPATIENT)
Dept: NEUROLOGY | Age: 34
End: 2019-04-03

## 2019-04-03 VITALS
HEART RATE: 72 BPM | BODY MASS INDEX: 31.32 KG/M2 | WEIGHT: 188 LBS | OXYGEN SATURATION: 98 % | DIASTOLIC BLOOD PRESSURE: 82 MMHG | SYSTOLIC BLOOD PRESSURE: 120 MMHG | RESPIRATION RATE: 20 BRPM | HEIGHT: 65 IN

## 2019-04-03 DIAGNOSIS — M54.50 CHRONIC BILATERAL LOW BACK PAIN WITHOUT SCIATICA: Primary | ICD-10-CM

## 2019-04-03 DIAGNOSIS — G89.29 CHRONIC BILATERAL LOW BACK PAIN WITHOUT SCIATICA: Primary | ICD-10-CM

## 2019-04-03 RX ORDER — OXYCODONE AND ACETAMINOPHEN 5; 325 MG/1; MG/1
1 TABLET ORAL
Qty: 45 TAB | Refills: 0 | Status: SHIPPED | OUTPATIENT
Start: 2019-04-03 | End: 2019-05-03

## 2019-04-03 NOTE — PROGRESS NOTES
Interval HPI:     This is a 36 y.o. female who is following up for     PMHx: chronic back pain, hx of fibromyalgia, hx of DVT/ coumadin, hx of Lupus, mild lower lumbar disc degeneration (L4-5 and L5-S1 with small annular tear at L5-S1)    Chief Complaint   Patient presents with    Back Pain     Follow Up      No significant change in moderate lower back pain. Continues taking Percocet 5/ 325 one to two tabs per day. Ran out of her Rx 1 day ago. Have been gradually reducing # of hydrocodone tablets, last visit given Rx for #50 tablets. Tried/ failed: Gabapentin, Cymbalta, Amitriptyline (only helped get to sleep), Lyrica (abnormal behaviors), Hydrocodone (\"too strong\")    Reviewed : no abberant behaviors  Last filled on 3-6-19 (#50 tabs)     UDS Hx:  March 2017: consistent with Rx pain medication  9-13-17 UDS: only screened for codeine or morphine, which pt doesn't take  1-3-18: serum Drug screen: consistent with Rx pain medication (oxycodone)  11-19-18: serum drug screen; consistent with Rx pain medication (oxycodone)      Brief ROS: as above      Past Medical History:   Diagnosis Date    Anemia     secondary to lupus    Asthma     no inhaler use in past 2 to 3 years    Carditis     Chronic kidney disease     ESRD    Chronic pain     DDD (degenerative disc disease), lumbar     ESR[facility] disease) [facility] [address]     GERD (gastroesophageal reflux disease)     Hemodialysis patient ([facility] [address] 12/21/2017    [address]  Tuesday,  Thursday,  and Saturday.  Hypercholesterolemia     Hypertension     Intractable nausea and vomiting 10/21/2015    Long term (current) use of anticoagulants     Lupus (systemic lupus erythematosus) (HCC)     Malignant hypertension with chronic kidney disease stage V ([address]     Peritoneal dialysis status ([address] 10/2015    x 2 years Stopped 12/2017 due to infection and removed.     Poor historian 01/17/2018    With medications    Thromboembolus [facility]) 2013    lungs    Transfusion history     Last Transfusion 2017  at [facility]       Past Surgical History:   Procedure Laterality Date    HX  SECTION  11/2006    x1    HX OTHER SURGICAL  9/16/15    INSERTION PD CATH;  Removed 2017    HX VASCULAR ACCESS Right 2017    Double-Lumen henry catheter upper chest    HX VASCULAR ACCESS Right 2018    right upper arm       Family History   Problem Relation Age of Onset    Diabetes Father     Hypertension Father     Cancer Other         aunt with breast cancer    Diabetes Mother        Social History     Socioeconomic History    Marital status: SINGLE     Spouse name: Not on file    Number of children: Not on file    Years of education: Not on file    Highest education level: Not on file   Occupational History    Not on file   Social Needs    Financial resource strain: Not on file    Food insecurity:     Worry: Not on file     Inability: Not on file    Transportation needs:     Medical: Not on file     Non-medical: Not on file   Tobacco Use    Smoking status: Never Smoker    Smokeless tobacco: Never Used   Substance and Sexual Activity    Alcohol use: No    Drug use: Yes     Types: Prescription, OTC    Sexual activity: Not Currently   Lifestyle    Physical activity:     Days per week: Not on file     Minutes per session: Not on file    Stress: Not on file   Relationships    Social connections:     Talks on phone: Not on file     Gets together: Not on file     Attends Tenriism service: Not on file     Active member of club or organization: Not on file     Attends meetings of clubs or organizations: Not on file     Relationship status: Not on file    Intimate partner violence:     Fear of current or ex partner: Not on file     Emotionally abused: Not on file     Physically abused: Not on file     Forced sexual activity: Not on file   Other Topics Concern     Service No    Blood Transfusions No    Caffeine Concern No    Occupational Exposure No    Hobby Hazards No    Sleep Concern No    Stress Concern No    Weight Concern No    Special Diet No    Back Care Yes     Comment: low back pain    Exercise No    Bike Helmet No    Seat Belt Yes    Self-Exams No   Social History Narrative    Lives with parents and daughter. Allergies   Allergen Reactions    Compazine [Prochlorperazine Edisylate] Nausea and Vomiting and Palpitations    Compazine [Prochlorperazine] Other (comments)     Hot and lightheaded. Current Outpatient Medications   Medication Sig Dispense Refill    oxyCODONE-acetaminophen (PERCOCET) 5-325 mg per tablet Take 1 Tab by mouth two (2) times daily as needed for Pain (severe back pain) for up to 30 days. Max Daily Amount: 2 Tabs. 45 Tab 0    amitriptyline (ELAVIL) 50 mg tablet Take 1 Tab by mouth nightly. 30 Tab 5    ondansetron (ZOFRAN ODT) 8 mg disintegrating tablet Take 1 Tab by mouth every eight (8) hours as needed for Nausea. 15 Tab 0    acetaminophen (TYLENOL EXTRA STRENGTH) 500 mg tablet Take 2 Tabs by mouth every six (6) hours as needed for Pain. 30 Tab 0    ferric citrate (AURYXIA) 210 mg iron tablet Take 210 mg by mouth three (3) times daily (with meals).  carvedilol (COREG) 25 mg tablet Take 1 Tab by mouth two (2) times daily (with meals). 60 Tab 0    amLODIPine (NORVASC) 10 mg tablet Take 1 Tab by mouth daily. 30 Tab 0    apixaban (ELIQUIS) 5 mg tablet Take 5 mg by mouth two (2) times a day. Indications: pulmonary thromboembolism      polyethylene glycol (MIRALAX) 17 gram packet Take 17 g by mouth daily as needed.  predniSONE (DELTASONE) 5 mg tablet Take 2 Tabs by mouth daily. 30 Tab 0    senna (SENNA) 8.6 mg tablet Take 1 Tab by mouth daily as needed for Constipation. for constipation      gemfibrozil (LOPID) 600 mg tablet Take 300 mg by mouth daily.  azaTHIOprine (IMURAN) 50 mg tablet Take 50 mg by mouth daily (after breakfast).       albuterol (PROVENTIL HFA, VENTOLIN HFA, PROAIR HFA) 90 mcg/actuation inhaler Take 1-2 Puffs by inhalation every four (4) hours as needed for Wheezing or Shortness of Breath. 1 Inhaler 2    hydroxychloroquine (PLAQUENIL) 200 mg tablet Take 200 mg by mouth two (2) times a day.  pantoprazole (PROTONIX) 40 mg tablet Take 1 Tab by mouth Daily (before breakfast). 30 Tab 0       Physical Exam  Vitals:    04/03/19 1303   BP: 120/82   BP 1 Location: Left arm   BP Patient Position: Sitting   Pulse: 72   Resp: 20   SpO2: 98%   Weight: 85.3 kg (188 lb)   Height: 5' 5\" (1.651 m)       Awake, alert, conversant  Neck: supple  Ext: dialysis fistula in right forearm    MSK:  Lumbar spine:  Mild pain with back extension > flexion       Focused Neurological Exam     Mental status:   Alert and oriented to person, place situation  Mood appears stable  Normal thought processes    CNs: EOMI, Face symmetric, Hearing/ Language normal  Sensory: intact light touch  Motor: 4/ 5 strength in arms and legs    Reflexes: not examined today  Gait: normal    Impression    ICD-10-CM ICD-9-CM    1. Chronic bilateral low back pain without sciatica M54.5 724.2 oxyCODONE-acetaminophen (PERCOCET) 5-325 mg per tablet    G89.29 338.29         1) Chronic bilateral lower back pain without sciatica    Continue Percocet 5/ 325 one tab up to two times per day for severe back pain. Reduced # of Percocet tabs to #45 for this month. Will gradually keep lowering # of tablets. Follow up in 4 weeks.      Signed By: Hossein Ponce MD     April 3, 2019

## 2019-04-03 NOTE — PROGRESS NOTES
1. Have you been to the ER, urgent care clinic since your last visit? Hospitalized since your last visit?no    2. Have you seen or consulted any other health care providers outside of the [facility] since your last visit? Include any pap smears or colon screening.    no patient

## 2019-04-08 ENCOUNTER — OFFICE VISIT (OUTPATIENT)
Dept: RHEUMATOLOGY | Age: 34
End: 2019-04-08

## 2019-04-08 VITALS
OXYGEN SATURATION: 92 % | BODY MASS INDEX: 30.65 KG/M2 | SYSTOLIC BLOOD PRESSURE: 156 MMHG | RESPIRATION RATE: 16 BRPM | DIASTOLIC BLOOD PRESSURE: 90 MMHG | TEMPERATURE: 98.2 F | WEIGHT: 184.2 LBS | HEART RATE: 107 BPM

## 2019-04-08 DIAGNOSIS — M32.9 LUPUS (HCC): Primary | ICD-10-CM

## 2019-04-08 RX ORDER — MINOXIDIL 2.5 MG/1
5 TABLET ORAL DAILY
COMMUNITY
Start: 2019-03-01 | End: 2020-03-16

## 2019-04-08 RX ORDER — HYDROXYCHLOROQUINE SULFATE 200 MG/1
400 TABLET, FILM COATED ORAL DAILY
Qty: 60 TAB | Refills: 6 | Status: SHIPPED | OUTPATIENT
Start: 2019-04-08 | End: 2019-04-08 | Stop reason: SDUPTHER

## 2019-04-08 RX ORDER — HYDROXYCHLOROQUINE SULFATE 200 MG/1
TABLET, FILM COATED ORAL
Qty: 180 TAB | Refills: 6 | Status: SHIPPED | OUTPATIENT
Start: 2019-04-08 | End: 2020-03-14 | Stop reason: CLARIF

## 2019-04-08 RX ORDER — LOSARTAN POTASSIUM 100 MG/1
100 TABLET ORAL DAILY
COMMUNITY
Start: 2019-03-01 | End: 2020-03-16

## 2019-04-08 NOTE — PROGRESS NOTES
RHEUMATOLOGY PROBLEM LIST AND CHIEF COMPLAINT  1. SLE (2009)-positive JIN (1:320), positive SSA, positive antihistone, positive double-stranded DNA, hypocomplementemia, Noncompliant (multiple rheumatologists), hematologic abnormalities, alopecia, polyarthritis, rash, pericardial effusion/pleural effusion    Therapy History:  Prior NSAIDs:   Prior DMARDs: Cytoxan, CellCept, rituximab (2012, 2 doses 9/2014 with no improvement in renal function now on PD), high-dose prednisone for many years   Current NSAIDs:  Current DMARDs: Imuran (7/2015-current), hydroxychloroquine (current)    2. History of Pulmonary embolus / DVT - negative antiphospholipid antibodies, currently on Eliquis    INTERVAL HISTORY  This is a 36 y.o.  female. Today, the patient complains of pain in the joints. Location: back  Severity:  4 on a scale of 0-10  Timing: intermittent  Duration: 3 years  Modifying factors:   Context/Associated signs and symptoms: The patient was last seen 2016. She denies significant complaints regarding her SLE. She has had multiple infections since her last visit. She continues with Plaquenil 200 mg BID and Imuran 50 mg BID.      RHEUMATOLOGY REVIEW OF SYSTEMS   Positives as per history  Negatives as follows:  Abel Yousifter:  Denies unexplained persistent fevers, weight change  HEAD/EYES:   Denies eye redness, blurry vision or sudden loss of vision, dry eyes, HA, temporal artery pain  ENT:    Denies oral/nasal ulcers, recurrent sinus infections, dry mouth  RESPIRATORY:  No pleuritic pain, history of pleural effusions, hemoptysis, exertional dyspnea  CARDIOVASCULAR:  Denies chest pain, history of pericardial effusions  GASTRO:   Denies heartburn, esophageal dysmotility, abdominal pain, nausea, vomiting, diarrhea, blood in the stool  HEMATOLOGIC:  No easy bruising, purpura, swollen lymph nodes  SKIN:    Denies alopecia, ulcers, nodules, sun sensitivity, unexplained persistent rash   VASCULAR:   Denies edema, cyanosis, raynaud phenomenon  NEUROLOGIC:  Denies specific muscle weakness, paresthesias   PSYCHIATRIC:  No sleep disturbance / snoring, depression, anxiety  MSK:    No morning stiffness >1 hour, SI joint pain, persistent joint swelling    PAST MEDICAL HISTORY  Reviewed with patient, significant changes in medical history - DVT/PE discussed     PHYSICAL EXAM  Blood pressure 156/90, pulse (!) 107, temperature 98.2 °F (36.8 °C), temperature source Oral, resp. rate 16, weight 184 lb 3.2 oz (83.6 kg), last menstrual period 04/07/2019, SpO2 92 %. GENERAL APPEARANCE: Well-nourished, no acute distress  NECK: No adenopathy  ENT: No oral ulcers  CARDIOVASCULAR: Heart rhythm is regular. No murmur, rub, gallop  CHEST: mild wheezing  ABDOMINAL: The abdomen is soft and nontender. Bowel sounds are normal  SKIN: No rash, palpable purpura, digital ulcer, abnormal thickening   MUSCULOSKELETAL:   Upper extremities - chronic synovial thickening of MCP joints - unchanged,   Lower extremities - bilateral bony prominence of knees with no tenderness. Muscle tenderness with ROM    LABS, RADIOLOGY AND PROCEDURES   Previous labs reviewed - Yes    ASSESSMENT  1. SLE (Established problem -  Progressive disease) -  We will stop the Imuran and Plaquenil due to multiple infections and low blood counts. If her hand pain returns and worsens, we will restart her medications. She should return in 4 months for a follow up. PLAN  1. Hold Hydroxychloroquine 200 mg BID  2. Hold Imuran 50 mg BID   3. Return in 4 months    Jonna Rawls MD  Adult and Pediatric Rheumatology     [address] [place] [address], Phone [phone], Fax [phone]   E-mail: [email or web address]    Visiting  of Pediatrics    Department of Pediatrics, [facility] of [address] [place] [address], [address] [place] [address], Phone [phone], Fax [phone]  E-mail: [email or web address]    There are no Patient Instructions on file for this visit. cc:  Cristobal Borg, NP Deep Burnetta Goltz    Written by jonathan Jang, as dictated by Rosan Siemens.  Burnetta Goltz, M.D.

## 2019-04-08 NOTE — PROGRESS NOTES
Chief Complaint   Patient presents with    Joint Pain     1. Have you been to the ER, urgent care clinic since your last visit? Hospitalized since your last visit? Yes [facility] Infection    2. Have you seen or consulted any other health care providers outside of the [address] [place] [address] since your last visit? Include any pap smears or colon screening.  No

## 2019-04-10 ENCOUNTER — PATIENT OUTREACH (OUTPATIENT)
Dept: FAMILY MEDICINE CLINIC | Age: 34
End: 2019-04-10

## 2019-04-10 NOTE — PROGRESS NOTES
SURJIT Follow-up assessment: [facility] admit for Hyperkalemia 3/4-3/6/19  Outbound call made to patient today to complete SURJIT follow up assessment. Patient verified by 3 identifiers. Follow-up appointments reviewed, and medication reconciliation completed. NN contact information given for questions and concerns. Patient has had one  Ed admission on 3/31/19, that was not related to her Hyperkalemia. She was seen foe UTI which she says has gotten better. Patient had follow up with neurology on 4/3/19 for low back pain. According to Dr. Angeline Alejandra,  Patient had No significant change in moderate lower back . Continues taking Percocet 5/ 325 one to two tabs per day. Ran out of her Rx 1 day ago. Have been gradually reducing # of hydrocodone tablets, last visit given Rx for #50 tablets. Patient had follow up with Rheumatology on 4/8/19. Patient was ordered hydroxychloroquine (PLAQUENIL) 200 mg tablet . Patient has graduated from the Transitions of Care Coordination  program on 4/10/19. Patient's symptoms are stable at this time. Patient/family has the ability to self-manage. Care management goals have been completed at this time. No further nurse navigator follow up scheduled. Goals Addressed     None          Pt has nurse navigator's contact information for any further questions, concerns, or needs. Patients upcoming visits:    Future Appointments   Date Time Provider Rocio Hardy   5/6/2019  1:00 PM Teena Baldwin MD [address]   8/5/2019  9:30 AM Amaya Loera MD [address] [place] [address]                Case management Plan:  NN will continue to attempt contacts with patient by telephone or during office visit within next 7-10 days.  Will continue to follow as necessary for the remaining 30 days post visit and will reassess for needs before discharge

## 2019-04-17 RX ORDER — AMITRIPTYLINE HYDROCHLORIDE 50 MG/1
TABLET, FILM COATED ORAL
Qty: 90 TAB | Refills: 4 | Status: SHIPPED | OUTPATIENT
Start: 2019-04-17 | End: 2019-07-22

## 2019-04-18 ENCOUNTER — HOSPITAL ENCOUNTER (INPATIENT)
Age: 34
LOS: 3 days | Discharge: HOME | DRG: 760 | End: 2019-04-21
Attending: EMERGENCY MEDICINE | Admitting: INTERNAL MEDICINE
Payer: MEDICARE

## 2019-04-18 ENCOUNTER — APPOINTMENT (OUTPATIENT)
Dept: ULTRASOUND IMAGING | Age: 34
DRG: 760 | End: 2019-04-18
Attending: EMERGENCY MEDICINE
Payer: MEDICARE

## 2019-04-18 DIAGNOSIS — D64.9 ANEMIA, UNSPECIFIED TYPE: ICD-10-CM

## 2019-04-18 DIAGNOSIS — N93.9 VAGINAL BLEEDING: Primary | ICD-10-CM

## 2019-04-18 LAB
ALBUMIN SERPL-MCNC: 3.5 G/DL (ref 3.5–5)
ALBUMIN/GLOB SERPL: 0.7 {RATIO} (ref 1.1–2.2)
ALP SERPL-CCNC: 82 U/L (ref 45–117)
ALT SERPL-CCNC: 16 U/L (ref 12–78)
ANION GAP SERPL CALC-SCNC: 7 MMOL/L (ref 5–15)
AST SERPL-CCNC: 37 U/L (ref 15–37)
BASOPHILS # BLD: 0 K/UL (ref 0–0.1)
BASOPHILS NFR BLD: 0 % (ref 0–1)
BILIRUB SERPL-MCNC: 0.6 MG/DL (ref 0.2–1)
BUN SERPL-MCNC: 17 MG/DL (ref 6–20)
BUN/CREAT SERPL: 3 (ref 12–20)
CALCIUM SERPL-MCNC: 8.6 MG/DL (ref 8.5–10.1)
CHLORIDE SERPL-SCNC: 95 MMOL/L (ref 97–108)
CO2 SERPL-SCNC: 35 MMOL/L (ref 21–32)
CREAT SERPL-MCNC: 5.04 MG/DL (ref 0.55–1.02)
DIFFERENTIAL METHOD BLD: ABNORMAL
EOSINOPHIL # BLD: 0 K/UL (ref 0–0.4)
EOSINOPHIL NFR BLD: 0 % (ref 0–7)
ERYTHROCYTE [DISTWIDTH] IN BLOOD BY AUTOMATED COUNT: 14.6 % (ref 11.5–14.5)
GLOBULIN SER CALC-MCNC: 5.3 G/DL (ref 2–4)
GLUCOSE SERPL-MCNC: 97 MG/DL (ref 65–100)
HCG SERPL QL: NEGATIVE
HCT VFR BLD AUTO: 24.5 % (ref 35–47)
HGB BLD-MCNC: 7.8 G/DL (ref 11.5–16)
IMM GRANULOCYTES # BLD AUTO: 0 K/UL (ref 0–0.04)
IMM GRANULOCYTES NFR BLD AUTO: 0 % (ref 0–0.5)
LYMPHOCYTES # BLD: 0.4 K/UL (ref 0.8–3.5)
LYMPHOCYTES NFR BLD: 12 % (ref 12–49)
MCH RBC QN AUTO: 28.2 PG (ref 26–34)
MCHC RBC AUTO-ENTMCNC: 31.8 G/DL (ref 30–36.5)
MCV RBC AUTO: 88.4 FL (ref 80–99)
MONOCYTES # BLD: 0.2 K/UL (ref 0–1)
MONOCYTES NFR BLD: 5 % (ref 5–13)
NEUTS SEG # BLD: 2.9 K/UL (ref 1.8–8)
NEUTS SEG NFR BLD: 83 % (ref 32–75)
NRBC # BLD: 0 K/UL (ref 0–0.01)
NRBC BLD-RTO: 0 PER 100 WBC
PLATELET # BLD AUTO: 181 K/UL (ref 150–400)
PMV BLD AUTO: 10.2 FL (ref 8.9–12.9)
POTASSIUM SERPL-SCNC: 4.7 MMOL/L (ref 3.5–5.1)
PROT SERPL-MCNC: 8.8 G/DL (ref 6.4–8.2)
RBC # BLD AUTO: 2.77 M/UL (ref 3.8–5.2)
RBC MORPH BLD: ABNORMAL
SODIUM SERPL-SCNC: 137 MMOL/L (ref 136–145)
WBC # BLD AUTO: 3.5 K/UL (ref 3.6–11)

## 2019-04-18 PROCEDURE — 80053 COMPREHEN METABOLIC PANEL: CPT

## 2019-04-18 PROCEDURE — 84703 CHORIONIC GONADOTROPIN ASSAY: CPT

## 2019-04-18 PROCEDURE — 96374 THER/PROPH/DIAG INJ IV PUSH: CPT

## 2019-04-18 PROCEDURE — 65660000001 HC RM ICU INTERMED STEPDOWN

## 2019-04-18 PROCEDURE — 76856 US EXAM PELVIC COMPLETE: CPT

## 2019-04-18 PROCEDURE — 85025 COMPLETE CBC W/AUTO DIFF WBC: CPT

## 2019-04-18 PROCEDURE — 96376 TX/PRO/DX INJ SAME DRUG ADON: CPT

## 2019-04-18 PROCEDURE — 76830 TRANSVAGINAL US NON-OB: CPT

## 2019-04-18 PROCEDURE — 74011250636 HC RX REV CODE- 250/636: Performed by: EMERGENCY MEDICINE

## 2019-04-18 PROCEDURE — 96375 TX/PRO/DX INJ NEW DRUG ADDON: CPT

## 2019-04-18 PROCEDURE — 36415 COLL VENOUS BLD VENIPUNCTURE: CPT

## 2019-04-18 PROCEDURE — 99284 EMERGENCY DEPT VISIT MOD MDM: CPT

## 2019-04-18 PROCEDURE — 86900 BLOOD TYPING SEROLOGIC ABO: CPT

## 2019-04-18 PROCEDURE — 86923 COMPATIBILITY TEST ELECTRIC: CPT

## 2019-04-18 RX ORDER — ACETAMINOPHEN 325 MG/1
650 TABLET ORAL
Status: DISCONTINUED | OUTPATIENT
Start: 2019-04-18 | End: 2019-04-19

## 2019-04-18 RX ORDER — MORPHINE SULFATE 4 MG/ML
4 INJECTION INTRAVENOUS ONCE
Status: COMPLETED | OUTPATIENT
Start: 2019-04-18 | End: 2019-04-18

## 2019-04-18 RX ORDER — MORPHINE SULFATE 4 MG/ML
4 INJECTION INTRAVENOUS ONCE
Status: COMPLETED | OUTPATIENT
Start: 2019-04-19 | End: 2019-04-18

## 2019-04-18 RX ORDER — ONDANSETRON 2 MG/ML
8 INJECTION INTRAMUSCULAR; INTRAVENOUS
Status: COMPLETED | OUTPATIENT
Start: 2019-04-18 | End: 2019-04-18

## 2019-04-18 RX ADMIN — MORPHINE SULFATE 4 MG: 4 INJECTION INTRAVENOUS at 23:06

## 2019-04-18 RX ADMIN — ONDANSETRON 8 MG: 2 INJECTION INTRAMUSCULAR; INTRAVENOUS at 21:21

## 2019-04-18 RX ADMIN — MORPHINE SULFATE 4 MG: 4 INJECTION INTRAVENOUS at 21:21

## 2019-04-18 NOTE — ED TRIAGE NOTES
Pt reports Olimpia Boswell has been on her menstrual cycle for the last 2 weeks which is not normal for her and she is a dialysis patient and she was last dialyzed today and she has been dizzy on and off for the past week and she is concerned she is bleeding too much and that is causing the dizziness. \"

## 2019-04-18 NOTE — ED PROVIDER NOTES
HPI     36year old female with lupus, ESRD, GERD, DDD, HTN, chol, PE on Eliquis presents with irregular periods and vaginal bleeding x 2 weeks. 5-6 pads/day. Had dialysis today. She has felt dizzy for about the past week, got worse after dialysis today. No chest pain or sob. Feels achy all over. No fevers. Is sexually active, is not on birth control. Does not have an ob/gyn. Mild lower abodminal cramping. Past Medical History:   Diagnosis Date    Anemia     secondary to lupus    Asthma     no inhaler use in past 2 to 3 years    Carditis     Chronic kidney disease     ESRD    Chronic pain     DDD (degenerative disc disease), lumbar     ESRD (end stage renal disease) (HCC)     GERD (gastroesophageal reflux disease)     Hemodialysis patient ([facility] 2017    [address]  Tuesday,  Thursday,  and Saturday. 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  Poor historian 2018    With medications    Thromboembolus ([facility] [address] [facility] 2013    lungs    Transfusion history     Last Transfusion 2017  at [facility]       Past Surgical History:   Procedure Laterality Date    HX  SECTION  11/2006    x1    HX OTHER SURGICAL  9/16/15    INSERTION PD CATH;  Removed 2017    HX VASCULAR ACCESS Right 2017    Double-Lumen henry catheter upper chest    HX VASCULAR ACCESS Right 2018    right upper arm         Family History:   Problem Relation Age of Onset    Diabetes Father     Hypertension Father     Cancer Other         aunt with breast cancer    Diabetes Mother        Social History     Socioeconomic History    Marital status: SINGLE     Spouse name: Not on file    Number of children: Not on file    Years of education: Not on file    Highest education level: Not on file   Occupational History    Not on file   Social Needs    Financial resource strain: Not on file    Food insecurity:     Worry: Not on file     Inability: Not on file    Transportation needs:     Medical: Not on file     Non-medical: Not on file   Tobacco Use    Smoking status: Never Smoker    Smokeless tobacco: Never Used   Substance and Sexual Activity    Alcohol use: No    Drug use: Yes     Types: Prescription, OTC    Sexual activity: Not Currently   Lifestyle    Physical activity:     Days per week: Not on file     Minutes per session: Not on file    Stress: Not on file   Relationships    Social connections:     Talks on phone: Not on file     Gets together: Not on file     Attends [employer] service: Not on file     Active member of club or organization: Not on file     Attends meetings of clubs or organizations: Not on file     Relationship status: Not on file    Intimate partner violence:     Fear of current or ex partner: Not on file     Emotionally abused: Not on file     Physically abused: Not on file     Forced sexual activity: Not on file   Other Topics Concern     Service No    Blood Transfusions No    Caffeine Concern No    Occupational Exposure No    Hobby Hazards No    Sleep Concern No    Stress Concern No    Weight Concern No    Special Diet No    Back Care Yes     Comment: low back pain    Exercise No    Bike Helmet No    Seat Belt Yes    Self-Exams No   Social History Narrative    Lives with parents and daughter. ALLERGIES: Compazine [prochlorperazine edisylate] and Compazine [prochlorperazine]    Review of Systems   Constitutional: Negative for fever. HENT: Negative for congestion. Eyes: Negative for visual disturbance. Respiratory: Negative for cough and shortness of breath. Cardiovascular: Negative for chest pain.    Gastrointestinal: Negative for abdominal pain and nausea. Genitourinary: Positive for menstrual problem, pelvic pain and vaginal bleeding. Negative for dysuria. Musculoskeletal: Negative for gait problem. Skin: Negative for rash. Neurological: Positive for light-headedness. Psychiatric/Behavioral: Negative for dysphoric mood. There were no vitals filed for this visit. Physical Exam   Constitutional: She is oriented to person, place, and time. She appears well-developed and well-nourished. No distress. HENT:   Head: Normocephalic and atraumatic. Mouth/Throat: No oropharyngeal exudate. Eyes: Pupils are equal, round, and reactive to light. Right eye exhibits no discharge. Left eye exhibits no discharge. No scleral icterus. Neck: Normal range of motion. Neck supple. No JVD present. Cardiovascular: Regular rhythm and normal heart sounds. Tachycardia present. No murmur heard. Pulmonary/Chest: Effort normal and breath sounds normal. No stridor. No respiratory distress. She has no wheezes. She has no rales. She exhibits no tenderness. Abdominal: Soft. Bowel sounds are normal. She exhibits no distension and no mass. There is no tenderness. There is no rebound and no guarding. Genitourinary:   Genitourinary Comments: Large clots in vault,  Scant bleeding from os   Musculoskeletal: Normal range of motion. Neurological: She is oriented to person, place, and time. Skin: Skin is warm and dry. Capillary refill takes less than 2 seconds. No rash noted. Psychiatric: She has a normal mood and affect. Her behavior is normal. Judgment and thought content normal.        MDM       Procedures      Hb dropped from 10.9 on 3/6 to 7.8 today. Large clots in vault with small amount of continued vaginal bleeding. Heart rate improved. No orthostatic by BP.    Given on Eliquis, ilda admit for monitoring and Gyn eval.     Hospitalist Wilbert for Admission  11:00 PM    ED Room Number: SER06/06  Patient Name and age: Chauncey Baldwin 36 y.o.  female  Working Diagnosis: No diagnosis found. Readmission: no  Isolation Requirements:  no  Recommended Level of Care:  telemetry  Code Status:  full  Other: 36year old female with ESRD on dialysis, Lupus, PE on Eliquis presents with 2 weeks of vaginal bleeding with weakness/dizziness and tachycardia. Hb dropped form 10.9 on march 6th to 7.8 today. Came in tachy in 120's but now about 105. BP ok. Large clots in vault but not significantly vaginally bleeding now.           [facility] who will follow in house

## 2019-04-19 LAB
ANION GAP SERPL CALC-SCNC: 7 MMOL/L (ref 5–15)
APTT PPP: 30.4 SEC (ref 22.1–32)
ATRIAL RATE: 104 BPM
BASOPHILS # BLD: 0 K/UL (ref 0–0.1)
BASOPHILS NFR BLD: 0 % (ref 0–1)
BUN SERPL-MCNC: 23 MG/DL (ref 6–20)
BUN/CREAT SERPL: 4 (ref 12–20)
CALCIUM SERPL-MCNC: 8.4 MG/DL (ref 8.5–10.1)
CALCULATED P AXIS, ECG09: 48 DEGREES
CALCULATED R AXIS, ECG10: 43 DEGREES
CALCULATED T AXIS, ECG11: 30 DEGREES
CHLORIDE SERPL-SCNC: 94 MMOL/L (ref 97–108)
CO2 SERPL-SCNC: 33 MMOL/L (ref 21–32)
CREAT SERPL-MCNC: 5.93 MG/DL (ref 0.55–1.02)
DIAGNOSIS, 93000: NORMAL
DIFFERENTIAL METHOD BLD: ABNORMAL
EOSINOPHIL # BLD: 0 K/UL (ref 0–0.4)
EOSINOPHIL NFR BLD: 0 % (ref 0–7)
ERYTHROCYTE [DISTWIDTH] IN BLOOD BY AUTOMATED COUNT: 14.9 % (ref 11.5–14.5)
GLUCOSE BLD STRIP.AUTO-MCNC: 90 MG/DL (ref 65–100)
GLUCOSE SERPL-MCNC: 92 MG/DL (ref 65–100)
HCT VFR BLD AUTO: 24.8 % (ref 35–47)
HGB BLD-MCNC: 7.4 G/DL (ref 11.5–16)
IMM GRANULOCYTES # BLD AUTO: 0 K/UL (ref 0–0.04)
IMM GRANULOCYTES NFR BLD AUTO: 0 % (ref 0–0.5)
INR PPP: 1.1 (ref 0.9–1.1)
LACTATE SERPL-SCNC: 0.8 MMOL/L (ref 0.4–2)
LYMPHOCYTES # BLD: 0.6 K/UL (ref 0.8–3.5)
LYMPHOCYTES NFR BLD: 25 % (ref 12–49)
MAGNESIUM SERPL-MCNC: 2.1 MG/DL (ref 1.6–2.4)
MCH RBC QN AUTO: 27.5 PG (ref 26–34)
MCHC RBC AUTO-ENTMCNC: 29.8 G/DL (ref 30–36.5)
MCV RBC AUTO: 92.2 FL (ref 80–99)
MONOCYTES # BLD: 0.3 K/UL (ref 0–1)
MONOCYTES NFR BLD: 11 % (ref 5–13)
NEUTS SEG # BLD: 1.5 K/UL (ref 1.8–8)
NEUTS SEG NFR BLD: 63 % (ref 32–75)
NRBC # BLD: 0 K/UL (ref 0–0.01)
NRBC BLD-RTO: 0 PER 100 WBC
P-R INTERVAL, ECG05: 142 MS
PLATELET # BLD AUTO: 173 K/UL (ref 150–400)
PMV BLD AUTO: 10.4 FL (ref 8.9–12.9)
POTASSIUM SERPL-SCNC: 4.4 MMOL/L (ref 3.5–5.1)
PROTHROMBIN TIME: 11.5 SEC (ref 9–11.1)
Q-T INTERVAL, ECG07: 378 MS
QRS DURATION, ECG06: 90 MS
QTC CALCULATION (BEZET), ECG08: 497 MS
RBC # BLD AUTO: 2.69 M/UL (ref 3.8–5.2)
SERVICE CMNT-IMP: NORMAL
SODIUM SERPL-SCNC: 134 MMOL/L (ref 136–145)
THERAPEUTIC RANGE,PTTT: NORMAL SECS (ref 58–77)
TROPONIN I SERPL-MCNC: <0.05 NG/ML
VENTRICULAR RATE, ECG03: 104 BPM
WBC # BLD AUTO: 2.3 K/UL (ref 3.6–11)

## 2019-04-19 PROCEDURE — 74011250637 HC RX REV CODE- 250/637: Performed by: FAMILY MEDICINE

## 2019-04-19 PROCEDURE — 80048 BASIC METABOLIC PNL TOTAL CA: CPT

## 2019-04-19 PROCEDURE — 83735 ASSAY OF MAGNESIUM: CPT

## 2019-04-19 PROCEDURE — 85610 PROTHROMBIN TIME: CPT

## 2019-04-19 PROCEDURE — 74011250636 HC RX REV CODE- 250/636: Performed by: FAMILY MEDICINE

## 2019-04-19 PROCEDURE — 85025 COMPLETE CBC W/AUTO DIFF WBC: CPT

## 2019-04-19 PROCEDURE — 82962 GLUCOSE BLOOD TEST: CPT

## 2019-04-19 PROCEDURE — 65660000001 HC RM ICU INTERMED STEPDOWN

## 2019-04-19 PROCEDURE — 36415 COLL VENOUS BLD VENIPUNCTURE: CPT

## 2019-04-19 PROCEDURE — 85730 THROMBOPLASTIN TIME PARTIAL: CPT

## 2019-04-19 PROCEDURE — 93005 ELECTROCARDIOGRAM TRACING: CPT

## 2019-04-19 PROCEDURE — 84484 ASSAY OF TROPONIN QUANT: CPT

## 2019-04-19 PROCEDURE — 83605 ASSAY OF LACTIC ACID: CPT

## 2019-04-19 RX ORDER — SODIUM CHLORIDE 0.9 % (FLUSH) 0.9 %
5-40 SYRINGE (ML) INJECTION EVERY 8 HOURS
Status: DISCONTINUED | OUTPATIENT
Start: 2019-04-19 | End: 2019-04-21 | Stop reason: HOSPADM

## 2019-04-19 RX ORDER — OXYCODONE AND ACETAMINOPHEN 5; 325 MG/1; MG/1
1 TABLET ORAL
Status: DISCONTINUED | OUTPATIENT
Start: 2019-04-19 | End: 2019-04-21 | Stop reason: HOSPADM

## 2019-04-19 RX ORDER — OXYCODONE AND ACETAMINOPHEN 5; 325 MG/1; MG/1
1 TABLET ORAL ONCE
Status: COMPLETED | OUTPATIENT
Start: 2019-04-19 | End: 2019-04-19

## 2019-04-19 RX ORDER — SODIUM CHLORIDE 9 MG/ML
250 INJECTION, SOLUTION INTRAVENOUS AS NEEDED
Status: DISCONTINUED | OUTPATIENT
Start: 2019-04-19 | End: 2019-04-21 | Stop reason: HOSPADM

## 2019-04-19 RX ORDER — SODIUM CHLORIDE 0.9 % (FLUSH) 0.9 %
5-40 SYRINGE (ML) INJECTION AS NEEDED
Status: DISCONTINUED | OUTPATIENT
Start: 2019-04-19 | End: 2019-04-21 | Stop reason: HOSPADM

## 2019-04-19 RX ORDER — MORPHINE SULFATE 2 MG/ML
2 INJECTION, SOLUTION INTRAMUSCULAR; INTRAVENOUS
Status: DISCONTINUED | OUTPATIENT
Start: 2019-04-19 | End: 2019-04-19

## 2019-04-19 RX ADMIN — Medication 10 ML: at 21:36

## 2019-04-19 RX ADMIN — Medication 10 ML: at 06:27

## 2019-04-19 RX ADMIN — MORPHINE SULFATE 2 MG: 2 INJECTION, SOLUTION INTRAMUSCULAR; INTRAVENOUS at 04:14

## 2019-04-19 RX ADMIN — OXYCODONE AND ACETAMINOPHEN 1 TABLET: 5; 325 TABLET ORAL at 12:50

## 2019-04-19 RX ADMIN — Medication 10 ML: at 14:00

## 2019-04-19 NOTE — ROUTINE PROCESS
TRANSFER - OUT REPORT:    Verbal report given to Camilla Saldaña RN(name) on Angella Jimenez  being transferred to [facility](unit) for routine progression of care       Report consisted of patients Situation, Background, Assessment and   Recommendations(SBAR). Information from the following report(s) SBAR was reviewed with the receiving nurse. Lines:   Peripheral IV 04/18/19 Left;Upper Arm (Active)   Site Assessment Clean, dry, & intact 4/18/2019  8:19 PM   Phlebitis Assessment 0 4/18/2019  8:19 PM   Infiltration Assessment 0 4/18/2019  8:19 PM   Dressing Status Clean, dry, & intact 4/18/2019  8:19 PM   Dressing Type Transparent 4/18/2019  8:19 PM   Hub Color/Line Status Patent; Flushed;Capped 4/18/2019  8:19 PM   Action Taken Blood drawn 4/18/2019  8:19 PM        Opportunity for questions and clarification was provided.       Patient transported with:   [facility]

## 2019-04-19 NOTE — PROGRESS NOTES
TRANSFER - IN REPORT:    Verbal report received from Carlotta Barton RN (name) on Angella Jimenez  being received from [facility] (unit) for routine progression of care      Report consisted of patients Situation, Background, Assessment and   Recommendations(SBAR). Information from the following report(s) Kardex, ED Summary, OR Summary, Procedure Summary, Intake/Output, MAR, Accordion, Recent Results, Med Rec Status and Cardiac Rhythm Sinus Tach was reviewed with the receiving nurse. Opportunity for questions and clarification was provided. Assessment completed upon patients arrival to unit and care assumed.

## 2019-04-19 NOTE — H&P
History & Physical    Date of admission: 4/18/2019    Patient name: Akua Rodney  MRN: [id number]  YOB: 1985  Age: 36 y.o. Primary care provider:  Oren Verma NP     Source of Information: patient, ED and electronic medical records                                  Chief complaint:  Vaginal bleeding    History of present illness  Akua Rodney is a 41-year-old [facility] American female with past medical  history of anemia, lupus, end-stage renal disease, on hemodialysis; carditis, asthma, chronic pain, degenerative disk disease, GERD, hypertension, hyperlipidemia, PE, long term anticoagulation on Eliquis presented as a direct admission/transfer from [facility] Emergency Department to [facility] with the patient's chief complaint of abdominal pain and   vaginal bleeding. The patient initially went to [facility] Emergency Department yesterday with complaints of irregular, heavy, vaginal bleeding over the past two weeks, dizziness over the past week, and onset of lower quadrant cramping, constant, mild abdominal pain (worsened and now severe rated 8/10). Per ED reports, patient has been changing per vaginal pads ~ 5 - 6 times per day. Patient is on Eliquis. Of note, patient was last hospitalized from 3/4/2019 - 3/6/2019 with diagnosis of abdominal pain and hyperkalemia. On initial presentation at [facility], initial vital signs were recorded as BP= 129/86, HR= 110, RR= 18, O2sat= 96% on room air. Patient reports that she has changed vaginal pad since arrival in the ED. ED MD request transfer to [facility] for admission to the hospitalist service. Patient was given Morphine 4 mg IV prior to transfer but patient notes that abdominal pain is not resolved. Patient is anuric.   There were no reports of new onset syncope, loss of consciousness, headache, neck pain, visual disturbance, numbness, paresthesias, focal weakness, slurred speech, facial droop, chest pain, palpitations, shortness of breath, nausea, vomiting, diarrhea, constipation (last bowel movement yesterday), melena, calf pain, increased leg swelling/ edema, fever, chills, or rash. PAST MEDICAL HISTORY:  Past Medical History:   Diagnosis Date    Anemia     secondary to lupus    Asthma     no inhaler use in past 2 to 3 years    Carditis     Chronic kidney disease     ESRD    Chronic pain     DDD (degenerative disc disease), lumbar     ESRD (end stage renal disease) (HCC)     GERD (gastroesophageal reflux disease)     Hemod[address] patient ([facility] [address] 2017    [address]  Tuesday,  Thursday,  and Saturday.  Hypercholesterolemia     Hypertension     Intractable nausea and vomiting 10/21/2015    Long term (current) use of anticoagulants     Lupus (systemic lupus erythematosus) (HCC)     Malignant hypertension with chronic kidney disease stage V ([facility] [address]     Peritoneal dialysis status ([facility] [address] 10/2015    x 2 years Stopped 2017 due to infection and removed.  Poor historian 2018    With medications    Thromboembolus ([facility] [address] 2013    lungs    Transfusion history     Last Transfusion 2017  at [facility]      PAST SURGICAL HISTORY:  Past Surgical History:   Procedure Laterality Date    HX  SECTION  11/2006    x1    HX OTHER SURGICAL  9/16/15    INSERTION PD CATH; Removed 2017    HX VASCULAR ACCESS Right 2017    Double-Lumen henry catheter upper chest    HX VASCULAR ACCESS Right 2018    right upper arm     MEDICATIONS:  Prior to Admission medications    Medication Sig Start Date End Date Taking?  Authorizing Provider   amitriptyline (ELAVIL) 50 mg tablet TAKE 1 TABLET BY MOUTH EVERY NIGHT AT BEDTIME 19  Yes Harley Josue, NP   minoxidil (LONITEN) 2.5 mg tablet  3/1/19  Yes Provider, Historical   losartan (COZAAR) 100 mg tablet  3/1/19  Yes Provider, Historical   hydroxychloroquine (PLAQUENIL) 200 mg tablet TAKE 2 TABLETS BY MOUTH DAILY 4/8/19  Yes Jose Roldan MD   oxyCODONE-acetaminophen (PERCOCET) 5-325 mg per tablet Take 1 Tab by mouth two (2) times daily as needed for Pain (severe back pain) for up to 30 days. Max Daily Amount: 2 Tabs. 4/3/19 5/3/19 Yes Mariya Smalls MD   ferric citrate (AURYXIA) 210 mg iron tablet Take 210 mg by mouth three (3) times daily (with meals). Yes Provider, Historical   carvedilol (COREG) 25 mg tablet Take 1 Tab by mouth two (2) times daily (with meals). 8/19/18  Yes Fabian Ulloa MD   amLODIPine (NORVASC) 10 mg tablet Take 1 Tab by mouth daily. 7/22/18  Yes April Zamora MD   predniSONE (DELTASONE) 5 mg tablet Take 2 Tabs by mouth daily. 6/28/18  Yes Dawood Hall MD   senna (SENNA) 8.6 mg tablet Take 1 Tab by mouth daily as needed for Constipation. for constipation   Yes Provider, Historical   gemfibrozil (LOPID) 600 mg tablet Take 300 mg by mouth daily. 11/3/17  Yes Provider, Historical   azaTHIOprine (IMURAN) 50 mg tablet Take 50 mg by mouth daily (after breakfast). 11/3/17  Yes Provider, Historical   hydroxychloroquine (PLAQUENIL) 200 mg tablet Take 200 mg by mouth two (2) times a day. Yes Other, MD Darnell   pantoprazole (PROTONIX) 40 mg tablet Take 1 Tab by mouth Daily (before breakfast). 10/23/15  Yes John Paul Baxter MD   ondansetron (ZOFRAN ODT) 8 mg disintegrating tablet Take 1 Tab by mouth every eight (8) hours as needed for Nausea. 11/16/18   Molly Pedroza MD   acetaminophen (TYLENOL EXTRA STRENGTH) 500 mg tablet Take 2 Tabs by mouth every six (6) hours as needed for Pain. 11/16/18   Molly Pedroza MD   apixaban (ELIQUIS) 5 mg tablet Take 5 mg by mouth two (2) times a day. Indications: pulmonary thromboembolism    Provider, Historical   polyethylene glycol (MIRALAX) 17 gram packet Take 17 g by mouth daily as needed.     Provider, Historical   albuterol (PROVENTIL HFA, VENTOLIN HFA, PROAIR HFA) 90 mcg/actuation inhaler Take 1-2 Puffs by inhalation every four (4) hours as needed for Wheezing or Shortness of Breath. 10/30/17   Robbie Lopez NP     ALLERGIES:  Allergies   Allergen Reactions    Compazine [Prochlorperazine Edisylate] Nausea and Vomiting and Palpitations    Compazine [Prochlorperazine] Other (comments)     Hot and lightheaded. FAMILY HISTORY:  Family History   Problem Relation Age of Onset    Diabetes Father     Hypertension Father     Cancer Other         aunt with breast cancer    Diabetes Mother          Social history  Patient resides  X  Independently                Ambulates  X  Independently                 Alcohol history   X  None           Smoking history  X  None             Social History     Tobacco Use   Smoking Status Never Smoker   Smokeless Tobacco Never Used       Code status  X  Full code       Review of systems  Pertinent positives as noted in HPI. All other systems were reviewed and were negative    Physical Examination   Visit Vitals  /86 (BP 1 Location: Left arm, BP Patient Position: At rest)   Pulse (!) 106   Temp 98.7 °F (37.1 °C)   Resp 23   Ht 5' 5\" (1.651 m)   Wt 76.8 kg (169 lb 5 oz)   LMP 04/07/2019 (Exact Date)   SpO2 92%   BMI 28.18 kg/m²          O2 Device: Room air    General:  Overweight female in no acute respiratory distress   Head:  Normocephalic, without obvious abnormality, atraumatic   Eyes:  Conjunctivae/corneas clear. PERRL, EOMs intact   E/N/M/T: Nares normal. Septum midline.  No nasal drainage or sinus tenderness  Tongue midline/ non-edematous  Clear oropharynx   Neck: Normal appearance and movements, symmetrical, trachea midline  No palpable adenopathy  No thyroid enlargement, tenderness or nodules  No carotid bruit   No JVD   Lungs:   Symmetrical chest expansion and respiratory effort  Clear to auscultation bilaterally   Chest wall:  No tenderness or deformity   Heart:  Tachycardic  Regular rhythm   Sounds normal; no murmur, click, rub or gallop   Abdomen:   Soft, no tenderness  No rebound, guarding, or rigidity  Non-distended  Bowel sounds normal  No masses or hepatosplenomegaly  No hernias present   Back: No CVA tenderness   Extremities: Extremities normal, atraumatic  No cyanosis or edema     Vascular/  Pulses: 2+ radial/ 1+ DP bilateral pulses   Skin: No rashes or ulcers  Warm/ dry   Musculo-      skeletal: Gait not tested  No calf tenderness   Neuro: GCS 15. Moves all extremities x 4. No slurred speech. No facial droop. Sensation grossly intact. Psych: Alert, oriented x 3           Data Review      24 Hour Results:  Recent Results (from the past 24 hour(s))   CBC WITH AUTOMATED DIFF    Collection Time: 04/18/19  7:49 PM   Result Value Ref Range    WBC 3.5 (L) 3.6 - 11.0 K/uL    RBC 2.77 (L) 3.80 - 5.20 M/uL    HGB 7.8 (L) 11.5 - 16.0 g/dL    HCT 24.5 (L) 35.0 - 47.0 %    MCV 88.4 80.0 - 99.0 FL    MCH 28.2 26.0 - 34.0 PG    MCHC 31.8 30.0 - 36.5 g/dL    RDW 14.6 (H) 11.5 - 14.5 %    PLATELET 673 374 - 970 K/uL    MPV 10.2 8.9 - 12.9 FL    NRBC 0.0 0  WBC    ABSOLUTE NRBC 0.00 0.00 - 0.01 K/uL    NEUTROPHILS 83 (H) 32 - 75 %    LYMPHOCYTES 12 12 - 49 %    MONOCYTES 5 5 - 13 %    EOSINOPHILS 0 0 - 7 %    BASOPHILS 0 0 - 1 %    IMMATURE GRANULOCYTES 0 0.0 - 0.5 %    ABS. NEUTROPHILS 2.9 1.8 - 8.0 K/UL    ABS. LYMPHOCYTES 0.4 (L) 0.8 - 3.5 K/UL    ABS. MONOCYTES 0.2 0.0 - 1.0 K/UL    ABS. EOSINOPHILS 0.0 0.0 - 0.4 K/UL    ABS. BASOPHILS 0.0 0.0 - 0.1 K/UL    ABS. IMM.  GRANS. 0.0 0.00 - 0.04 K/UL    DF AUTOMATED      RBC COMMENTS ANISOCYTOSIS  1+       METABOLIC PANEL, COMPREHENSIVE    Collection Time: 04/18/19  7:49 PM   Result Value Ref Range    Sodium 137 136 - 145 mmol/L    Potassium 4.7 3.5 - 5.1 mmol/L    Chloride 95 (L) 97 - 108 mmol/L    CO2 35 (H) 21 - 32 mmol/L    Anion gap 7 5 - 15 mmol/L    Glucose 97 65 - 100 mg/dL    BUN 17 6 - 20 MG/DL    Creatinine 5.04 (H) 0.55 - 1.02 MG/DL    BUN/Creatinine ratio 3 (L) 12 - 20      GFR est AA 12 (L) >60 ml/min/1.73m2    GFR est non-AA 10 (L) >60 ml/min/1.73m2    Calcium 8.6 8.5 - 10.1 MG/DL    Bilirubin, total 0.6 0.2 - 1.0 MG/DL    ALT (SGPT) 16 12 - 78 U/L    AST (SGOT) 37 15 - 37 U/L    Alk. phosphatase 82 45 - 117 U/L    Protein, total 8.8 (H) 6.4 - 8.2 g/dL    Albumin 3.5 3.5 - 5.0 g/dL    Globulin 5.3 (H) 2.0 - 4.0 g/dL    A-G Ratio 0.7 (L) 1.1 - 2.2     TYPE & SCREEN    Collection Time: 04/18/19  7:49 PM   Result Value Ref Range    Crossmatch Expiration 04/21/2019     ABO/Rh(D) Marissa Rodrigueseger POSITIVE     Antibody screen NEG    HCG QL SERUM    Collection Time: 04/18/19  7:49 PM   Result Value Ref Range    HCG, Ql. NEGATIVE  NEG       Recent Labs     04/18/19 1949   WBC 3.5*   HGB 7.8*   HCT 24.5*        Recent Labs     04/18/19 1949      K 4.7   CL 95*   CO2 35*   GLU 97   BUN 17   CREA 5.04*   CA 8.6   ALB 3.5   TBILI 0.6   SGOT 37   ALT 16       Imaging  NON-OB PELVIC ULTRASOUND:  TRANS ABDOMINAL FINDINGS:  The uterus measures 8.8 x 5.1 x 7.1 cm. Small masses in the uterus suggesting  fibroids. The endometrial stripe is approximately 0.9 cm. The right ovary  measures 6 x 4 x 5.6 cm. The left ovary measures 3.4 x 2.4 x 3.2 cm. Irregular  hypoechoic lesion in the right ovary with a somewhat lacy echotexture which may  represent a hemorrhagic cyst. There is no visualized mass or fluid in the pelvic  cul-de-sac. Oris Ugalde TRANS VAGINAL FINDINGS:   The endometrial stripe measures 0.6 cm. The uterus measures approximately 8.3 x  4.4 x 5.4 cm. Likely small fibroids of the uterus The right ovary measures 5.8 x  3.8 x 5.1 cm and the left ovary measures 3.6 x 2 x 3.3 cm. Blood flow in both  ovaries. Hypoechoic lesion in the right ovary with a somewhat lacy echotexture  suggesting a possible hemorrhagic cyst There is no visualized fluid or mass in  the pelvic cul-de-sac. Elissa Ugalde IMPRESSION:    1. Possible hemorrhagic cyst in the right ovary.  Recommend follow-up to ensure  resolution. Assessment and Plan   1. Vaginal bleeding. Admit to telemetry with continued tachycardia. Consult gynecologist today for further evaluation. 2.  Abdominal pain. Will provide pain management while inpatient with supportive cares. 3.  Tachycardia. Plan as above. 4.  Hemorrhagic right ovarian cyst.  Plan as above. Consult GYN. 5.  Leukopenia. Repeat CBC. 6.   Anemia. Check B12, Folate, and iron provile. Repeat h/h. Transfuse for hemoglobin < 7.0.    7. ESRD. Consult nephrologist for hemodialysis. 8.  History of PE- on long term anti-coagulation with Eliquis. Assess for need for continue long term anti-coagulation with last CTA chest negative for PE.    9.  Hypertension. Currently controlled and BP may be decreased with current narcotic pain therapy. As such, repeat BP and restart home BP medications if BP increases. 10.  H/o lupus. On immunosuppressant therapy. Resume home medication. 11. VTE prophylaxis. SCDs to BLEs.        Signed by: Anabell Lynn MD    April 19, 2019 at 2:23 AM

## 2019-04-19 NOTE — PROGRESS NOTES
Primary Nurse Luis Kayser and Cristino Doctor, RN performed a dual skin assessment on this patient Impairment noted- dry heels, and tattoo on right shoulder. Vikash score is as charted.

## 2019-04-19 NOTE — CONSULTS
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   SLE    Plan/Recommendations:  HD tomorrow. Reginald made aware  Will order 2units of PRBCS with HD tomorrow  Renally adjust new meds  Am labs    Discussed with patient    Thanks for the consultation. Renal service will follow patient with you. Please contact me with any questions or concerns. Initial Consult note         Patient name: Kris Hui  MR no: [id number]  Date of admission: 4/18/2019  Date of consultation: 4/19/2019  Requested by: Dr. Montez High  Reason for consult: ESRD    Patient seen and examined. History obtained from patient and chart review. Relevant labs, data and notes reviewed. HPI: Kris Hui is a 36 y.o. female with PMH significant for ESRD on chronic HD TTS at [facility], hx of lupus nephritis presenting with vaginal bleeding for the last 2wks. + suprapubic pain. OB/GYN consulting. Hgb down to 7.4 today. Nephrology consulted to manage ESRD. No n/v. No SOB. PMH:  Past Medical History:   Diagnosis Date    Anemia     secondary to lupus    Asthma     no inhaler use in past 2 to 3 years    Carditis     Chronic kidney disease     ESRD    Chronic pain     DDD (degenerative disc disease), lumbar     ESRD (end stage renal disease) (HCC)     GERD (gastroesophageal reflux disease)     Hemodialysis patient ([facility] [address] 12/21/2017    [address]  Tuesday,  Thursday,  and Saturday.  Hypercholesterolemia     Hypertension     Intractable nausea and vomiting 10/21/2015    Long term (current) use of anticoagulants     Lupus (systemic lupus erythematosus) (HCC)     Malignant hypertension with chronic kidney disease stage V ([facility] [address]     Peritoneal dialysis status ([facility] [address] 10/2015    x 2 years Stopped 12/2017 due to infection and removed.     Poor historian 2018    With medications    Thromboembolus ([address] 2013    lungs    Transfusion history     Last Transfusion 2017  at [facility]     Baudilio Kirk:  Past Surgical History:   Procedure Laterality Date    HX  SECTION  11/2006    x1    HX OTHER SURGICAL  9/16/15    INSERTION PD CATH; Removed 2017    HX VASCULAR ACCESS Right 2017    Double-Lumen henry catheter upper chest    HX VASCULAR ACCESS Right 2018    right upper arm       Social history:   Social History     Tobacco Use    Smoking status: Never Smoker    Smokeless tobacco: Never Used   Substance Use Topics    Alcohol use: No    Drug use: Yes     Types: Prescription, OTC       Family history:  No history of CKD or ESRD in the family. Allergies   Allergen Reactions    Compazine [Prochlorperazine Edisylate] Nausea and Vomiting and Palpitations    Compazine [Prochlorperazine] Other (comments)     Hot and lightheaded. Current Facility-Administered Medications   Medication Dose Route Frequency Last Dose    sodium chloride (NS) flush 5-40 mL  5-40 mL IntraVENous Q8H 10 mL at 19 1400    sodium chloride (NS) flush 5-40 mL  5-40 mL IntraVENous PRN      oxyCODONE-acetaminophen (PERCOCET) 5-325 mg per tablet 1 Tab  1 Tab Oral BID PRN         ROS (besides HPI):    General: No fever. No weight changes  ENT: No hearing loss or visual changes  Cardiovascular: No Chest pain  Pulmonary: No SOB  GI: No abdominal pain. No Nausea/Vomiting/Diarrhea. : No blood in urine. No foamy or cloudy urine  Musculoskeletal: No joint swelling or redness.  No morning stiffness  Endocrine: no cold or heat intolerance  Psych: denies anxiety or depression  Neuro: No light headedness or dizziness    Objective   Visit Vitals  /65 (BP 1 Location: Left arm, BP Patient Position: At rest;Sitting)   Pulse 96   Temp 98.1 °F (36.7 °C)   Resp 18   Ht 5' 5\" (1.651 m)   Wt 76.8 kg (169 lb 5 oz)   LMP 2019 (Exact Date)   SpO2 100%   BMI 28.18 kg/m² Physical Exam:    Gen: NAD    HEENT: AT/NC, EOMI, moist mucous membrane, no scleral icterus    Neck: no JVD, no cervical lymphadenopathy, no carotid bruit    Lungs/Chest wall: Breath sounds normal. Symmetrical chest wall expansion. No accessory muscle use. Clear to auscultation    Cardiovascular: S1/S2, normal rate, regular rhythm. Abdomen: soft, NT, ND, BS+, no HSM    Ext: L AVG +b/t. No edema    Skin: warm and dry. No rashes    : no CVA tenderness    CNS: alert awake. Answers appropriately.      Labs/Data:    Lab Results   Component Value Date/Time    Sodium 134 (L) 04/19/2019 03:49 AM    Potassium 4.4 04/19/2019 03:49 AM    Chloride 94 (L) 04/19/2019 03:49 AM    CO2 33 (H) 04/19/2019 03:49 AM    Anion gap 7 04/19/2019 03:49 AM    Glucose 92 04/19/2019 03:49 AM    BUN 23 (H) 04/19/2019 03:49 AM    Creatinine 5.93 (H) 04/19/2019 03:49 AM    BUN/Creatinine ratio 4 (L) 04/19/2019 03:49 AM    GFR est AA 10 (L) 04/19/2019 03:49 AM    GFR est non-AA 8 (L) 04/19/2019 03:49 AM    Calcium 8.4 (L) 04/19/2019 03:49 AM       Lab Results   Component Value Date/Time    WBC 2.3 (L) 04/19/2019 03:49 AM    Hemoglobin (POC) 7.8 (L) 01/19/2018 12:30 PM    HGB 7.4 (L) 04/19/2019 03:49 AM    Hematocrit (POC) 23 (L) 01/19/2018 12:30 PM    HCT 24.8 (L) 04/19/2019 03:49 AM    PLATELET 281 31/33/0136 03:49 AM    MCV 92.2 04/19/2019 03:49 AM           No components found for: SPEP, UPEP  Lab Results   Component Value Date/Time    Protein,urine 24 hr 2071.00 (H) 05/30/2009 10:00 PM    Total protein 7.30 07/27/2009 03:30 AM     Lab Results   Component Value Date/Time    Microalbumin/Creat ratio (mg/g creat) 1015 09/22/2009 06:30 PM    Microalb/Creat ratio (ug/mg creat.) 2,443.5 (H) 03/07/2014 02:16 PM    Microalbumin,urine random 250.86 09/22/2009 06:30 PM         Intake/Output Summary (Last 24 hours) at 4/19/2019 1622  Last data filed at 4/19/2019 1300  Gross per 24 hour   Intake 500 ml   Output --   Net 500 ml       Wt Readings from Last 3 Encounters:   04/19/19 76.8 kg (169 lb 5 oz)   04/08/19 83.6 kg (184 lb 3.2 oz)   04/03/19 85.3 kg (188 lb)       Signed by:  Lissett Peña MD  Nephrology and Hypertension  [facility]

## 2019-04-19 NOTE — PROGRESS NOTES
Bedside and Verbal shift change report given to Charbel Lee RN (oncoming nurse) by Caden Payne RN (offgoing nurse). Report included the following information SBAR, Kardex, Recent Results and Cardiac Rhythm Sinus Tach.

## 2019-04-19 NOTE — PROGRESS NOTES
Problem: Anemia Care Plan (Adult and Pediatric)  Goal: *Labs within defined limits  Outcome: Not Progressing Towards Goal   Pt with decreased hemoglobin and vaginal bleeding. Plan for OB and Nephrology to see pt today, pt currently resting comfortably in bed. Will continue to monitor and educate. Bedside shift change report given to [facility] RN (oncoming nurse) by Ulices Pac (offgoing nurse). Report included the following information SBAR, Kardex, ED Summary, Procedure Summary, Intake/Output, MAR, Recent Results, Med Rec Status, Cardiac Rhythm NSR, Alarm Parameters  and Quality Measures.

## 2019-04-19 NOTE — PROGRESS NOTES
Patient seen and examined at bedside, still has severe lower abdominal pain, VB   persisted, cyclic provera per GYN  Recent Results (from the past 24 hour(s))   CBC WITH AUTOMATED DIFF    Collection Time: 04/18/19  7:49 PM   Result Value Ref Range    WBC 3.5 (L) 3.6 - 11.0 K/uL    RBC 2.77 (L) 3.80 - 5.20 M/uL    HGB 7.8 (L) 11.5 - 16.0 g/dL    HCT 24.5 (L) 35.0 - 47.0 %    MCV 88.4 80.0 - 99.0 FL    MCH 28.2 26.0 - 34.0 PG    MCHC 31.8 30.0 - 36.5 g/dL    RDW 14.6 (H) 11.5 - 14.5 %    PLATELET 668 331 - 191 K/uL    MPV 10.2 8.9 - 12.9 FL    NRBC 0.0 0  WBC    ABSOLUTE NRBC 0.00 0.00 - 0.01 K/uL    NEUTROPHILS 83 (H) 32 - 75 %    LYMPHOCYTES 12 12 - 49 %    MONOCYTES 5 5 - 13 %    EOSINOPHILS 0 0 - 7 %    BASOPHILS 0 0 - 1 %    IMMATURE GRANULOCYTES 0 0.0 - 0.5 %    ABS. NEUTROPHILS 2.9 1.8 - 8.0 K/UL    ABS. LYMPHOCYTES 0.4 (L) 0.8 - 3.5 K/UL    ABS. MONOCYTES 0.2 0.0 - 1.0 K/UL    ABS. EOSINOPHILS 0.0 0.0 - 0.4 K/UL    ABS. BASOPHILS 0.0 0.0 - 0.1 K/UL    ABS. IMM. GRANS. 0.0 0.00 - 0.04 K/UL    DF AUTOMATED      RBC COMMENTS ANISOCYTOSIS  1+       METABOLIC PANEL, COMPREHENSIVE    Collection Time: 04/18/19  7:49 PM   Result Value Ref Range    Sodium 137 136 - 145 mmol/L    Potassium 4.7 3.5 - 5.1 mmol/L    Chloride 95 (L) 97 - 108 mmol/L    CO2 35 (H) 21 - 32 mmol/L    Anion gap 7 5 - 15 mmol/L    Glucose 97 65 - 100 mg/dL    BUN 17 6 - 20 MG/DL    Creatinine 5.04 (H) 0.55 - 1.02 MG/DL    BUN/Creatinine ratio 3 (L) 12 - 20      GFR est AA 12 (L) >60 ml/min/1.73m2    GFR est non-AA 10 (L) >60 ml/min/1.73m2    Calcium 8.6 8.5 - 10.1 MG/DL    Bilirubin, total 0.6 0.2 - 1.0 MG/DL    ALT (SGPT) 16 12 - 78 U/L    AST (SGOT) 37 15 - 37 U/L    Alk.  phosphatase 82 45 - 117 U/L    Protein, total 8.8 (H) 6.4 - 8.2 g/dL    Albumin 3.5 3.5 - 5.0 g/dL    Globulin 5.3 (H) 2.0 - 4.0 g/dL    A-G Ratio 0.7 (L) 1.1 - 2.2     TYPE & SCREEN    Collection Time: 04/18/19  7:49 PM   Result Value Ref Range    Crossmatch Expiration 04/21/2019     ABO/Rh(D) Daryl Rosales POSITIVE     Antibody screen NEG    HCG QL SERUM    Collection Time: 04/18/19  7:49 PM   Result Value Ref Range    HCG, Ql. NEGATIVE  NEG     CBC WITH AUTOMATED DIFF    Collection Time: 04/19/19  3:49 AM   Result Value Ref Range    WBC 2.3 (L) 3.6 - 11.0 K/uL    RBC 2.69 (L) 3.80 - 5.20 M/uL    HGB 7.4 (L) 11.5 - 16.0 g/dL    HCT 24.8 (L) 35.0 - 47.0 %    MCV 92.2 80.0 - 99.0 FL    MCH 27.5 26.0 - 34.0 PG    MCHC 29.8 (L) 30.0 - 36.5 g/dL    RDW 14.9 (H) 11.5 - 14.5 %    PLATELET 963 203 - 247 K/uL    MPV 10.4 8.9 - 12.9 FL    NRBC 0.0 0  WBC    ABSOLUTE NRBC 0.00 0.00 - 0.01 K/uL    NEUTROPHILS 63 32 - 75 %    LYMPHOCYTES 25 12 - 49 %    MONOCYTES 11 5 - 13 %    EOSINOPHILS 0 0 - 7 %    BASOPHILS 0 0 - 1 %    IMMATURE GRANULOCYTES 0 0.0 - 0.5 %    ABS. NEUTROPHILS 1.5 (L) 1.8 - 8.0 K/UL    ABS. LYMPHOCYTES 0.6 (L) 0.8 - 3.5 K/UL    ABS. MONOCYTES 0.3 0.0 - 1.0 K/UL    ABS. EOSINOPHILS 0.0 0.0 - 0.4 K/UL    ABS. BASOPHILS 0.0 0.0 - 0.1 K/UL    ABS. IMM.  GRANS. 0.0 0.00 - 0.04 K/UL    DF AUTOMATED     PROTHROMBIN TIME + INR    Collection Time: 04/19/19  3:49 AM   Result Value Ref Range    INR 1.1 0.9 - 1.1      Prothrombin time 11.5 (H) 9.0 - 11.1 sec   PTT    Collection Time: 04/19/19  3:49 AM   Result Value Ref Range    aPTT 30.4 22.1 - 32.0 sec    aPTT, therapeutic range     58.0 - 37.6 SECS   METABOLIC PANEL, BASIC    Collection Time: 04/19/19  3:49 AM   Result Value Ref Range    Sodium 134 (L) 136 - 145 mmol/L    Potassium 4.4 3.5 - 5.1 mmol/L    Chloride 94 (L) 97 - 108 mmol/L    CO2 33 (H) 21 - 32 mmol/L    Anion gap 7 5 - 15 mmol/L    Glucose 92 65 - 100 mg/dL    BUN 23 (H) 6 - 20 MG/DL    Creatinine 5.93 (H) 0.55 - 1.02 MG/DL    BUN/Creatinine ratio 4 (L) 12 - 20      GFR est AA 10 (L) >60 ml/min/1.73m2    GFR est non-AA 8 (L) >60 ml/min/1.73m2    Calcium 8.4 (L) 8.5 - 10.1 MG/DL   MAGNESIUM    Collection Time: 04/19/19  3:49 AM   Result Value Ref Range    Magnesium 2.1 1.6 - 2.4 mg/dL   LACTIC ACID    Collection Time: 04/19/19  3:49 AM   Result Value Ref Range    Lactic acid 0.8 0.4 - 2.0 MMOL/L   TROPONIN I    Collection Time: 04/19/19  3:49 AM   Result Value Ref Range    Troponin-I, Qt. <0.05 <0.05 ng/mL   GLUCOSE, POC    Collection Time: 04/19/19  6:30 AM   Result Value Ref Range    Glucose (POC) 90 65 - 100 mg/dL    Performed by Rosaura Rose    EKG, 12 LEAD, INITIAL    Collection Time: 04/19/19  8:20 AM   Result Value Ref Range    Ventricular Rate 104 BPM    Atrial Rate 104 BPM    P-R Interval 142 ms    QRS Duration 90 ms    Q-T Interval 378 ms    QTC Calculation (Bezet) 497 ms    Calculated P Axis 48 degrees    Calculated R Axis 43 degrees    Calculated T Axis 30 degrees    Diagnosis       Sinus tachycardia  Nonspecific T wave abnormality  When compared with ECG of 04-MAR-2019 18:21,  TX interval has decreased  Criteria for Septal infarct are no longer present  T wave inversion no longer evident in Anterior leads  QT has lengthened       Visit Vitals  /69   Pulse 96   Temp 97.4 °F (36.3 °C)   Resp 15   Ht 5' 5\" (1.651 m)   Wt 169 lb 5 oz (76.8 kg)   SpO2 100%   BMI 28.18 kg/m²

## 2019-04-19 NOTE — PROGRESS NOTES
Problem: Pressure Injury - Risk of  Goal: *Prevention of pressure injury  Description  Document Vikash Scale and appropriate interventions in the flowsheet. Outcome: Progressing Towards Goal  Note:   Pressure Injury Interventions: Activity Interventions: Assess need for specialty bed, Increase time out of bed, Pressure redistribution bed/mattress(bed type)         Nutrition Interventions: Document food/fluid/supplement intake                     Problem: Falls - Risk of  Goal: *Absence of Falls  Description  Document Go Clemente Fall Risk and appropriate interventions in the flowsheet. Outcome: Progressing Towards Goal  Note:   Fall Risk Interventions:            Medication Interventions: Teach patient to arise slowly                   Problem: Anemia Care Plan (Adult and Pediatric)  Goal: *Labs within defined limits  Outcome: Not Progressing Towards Goal  Note:   CBC drawn early this morning. Hgb is 7.4  down from 7.8. Will continue to monitor.

## 2019-04-19 NOTE — CONSULTS
Gyn Consult    Subjective:     Stephanie Kim is a 36 y.o.   premenopausal female who is being seen for vaginal bleeding and anemia. Pt was seen at the [facility] and transferred to the Hospitalist service at [facility] due to c/o vaginal bleeding x 2 weeks requiring 5-6 pads per day and anemia (Hb 7.8). She noted dizziness after hey dialysis yesterday as well. She reports that her prior period was in October, she is sexually active with the same partner and uses condoms for birth control. She notes that her bleeding is much less today and her pain is improving. She is a patient of Dr. Sanchez/ANSELMO but was seen at the [facility] last year for her pap smear. Her current medical problems include Lupus, ESRD on hemodialysis, chronic anemia, HTN, Asthma, hyperlipidemia, DDD, carditis, fibroids and a PE for which she has been on long-term anticoagulation. OB/GYN ROS: she complains of irregular periods q 1-4 months with some assoc abd pain this time.  Denies clost, back pain, n/v.  OB/GYN history: history of (irregular periods and fibroids), obstetric history: ( : 2, Para: 1, Misc/Ab: 1) and contraception (condoms)    Patient Active Problem List    Diagnosis Date Noted    Vaginal bleeding 2019    Hyperkalemia 2019    ESRD on hemodialysis ([address] 2018    SOB (shortness of breath) 08/15/2018    Rib pain on right side 2018    Troponin level elevated 2018    Intra-abdominal abscess ([address] 2018    Sepsis ([address] 2018    Generalized abdominal pain 2018    Other constipation 2018    Other ascites 2018    Peritonitis ([address] 2018    History of pulmonary embolism 2017    Hypomagnesemia 10/30/2017    Hypocalcemia 10/30/2017    Hypokalemia 10/27/2017    Hypertension 10/14/2017    Chronic bilateral low back pain without sciatica 2017    Anemia of renal disease 2017    Dependence on peritoneal dialysis ([facility] 2017    ACP (advance care planning) 2017    Malignant hypertension 2016    Encounter for monitoring opioid maintenance therapy 2015    Lupus nephritis ([facility] 03/10/2012    Lupus 2012     Past Medical History:   Diagnosis Date    Anemia     secondary to lupus    Asthma     no inhaler use in past 2 to 3 years    Carditis     Chronic kidney disease     ESRD    Chronic pain     DDD (degenerative disc disease), lumbar     ESRD (end stage renal disease) ([facility]     GERD (gastroesophageal reflux disease)     Hemodialysis patient ([facility] 2017    [address]  Tuesday,  Thursday,  and Saturday.  Hypercholesterolemia     Hypertension     Intractable nausea and vomiting 10/21/2015    Long term (current) use of anticoagulants     Lupus (systemic lupus erythematosus) (HCC)     Malignant hypertension with chronic kidney disease stage V ([facility]     Peritoneal dialysis status ([facility] 10/2015    x 2 years Stopped 2017 due to infection and removed.  Poor historian 2018    With medications    Thromboembolus ([facility] 2013    lungs    Transfusion history     Last Transfusion 2017  at [facility]      Past Surgical History:   Procedure Laterality Date    HX  SECTION  11/2006    x1    HX OTHER SURGICAL  9/16/15    INSERTION PD CATH; Removed 2017    HX VASCULAR ACCESS Right 2017    Double-Lumen henry catheter upper chest    HX VASCULAR ACCESS Right 2018    right upper arm      Social History     Tobacco Use    Smoking status: Never Smoker    Smokeless tobacco: Never Used   Substance Use Topics    Alcohol use: No      Family History   Problem Relation Age of Onset    Diabetes Father     Hypertension Father     Cancer Other         aunt with breast cancer    Diabetes Mother       Prior to Admission Medications   Prescriptions Last Dose Informant Patient Reported?  Taking?   acetaminophen (TYLENOL EXTRA STRENGTH) 500 mg tablet Not Taking at Unknown time  No No   Sig: Take 2 Tabs by mouth every six (6) hours as needed for Pain. albuterol (PROVENTIL HFA, VENTOLIN HFA, PROAIR HFA) 90 mcg/actuation inhaler Not Taking at Unknown time Self No No   Sig: Take 1-2 Puffs by inhalation every four (4) hours as needed for Wheezing or Shortness of Breath. amLODIPine (NORVASC) 10 mg tablet 4/18/2019 at Unknown time  No Yes   Sig: Take 1 Tab by mouth daily. amitriptyline (ELAVIL) 50 mg tablet 4/17/2019 at Unknown time  No Yes   Sig: TAKE 1 TABLET BY MOUTH EVERY NIGHT AT BEDTIME   apixaban (ELIQUIS) 5 mg tablet Unknown at Unknown time  Yes No   Sig: Take 5 mg by mouth two (2) times a day. Indications: pulmonary thromboembolism   azaTHIOprine (IMURAN) 50 mg tablet 4/18/2019 at Unknown time Self Yes Yes   Sig: Take 50 mg by mouth daily (after breakfast). carvedilol (COREG) 25 mg tablet 4/18/2019 at Unknown time  No Yes   Sig: Take 1 Tab by mouth two (2) times daily (with meals). ferric citrate (AURYXIA) 210 mg iron tablet 4/18/2019 at Unknown time  Yes Yes   Sig: Take 210 mg by mouth three (3) times daily (with meals). gemfibrozil (LOPID) 600 mg tablet 4/18/2019 at Unknown time Self Yes Yes   Sig: Take 300 mg by mouth daily. hydroxychloroquine (PLAQUENIL) 200 mg tablet 4/18/2019 at Unknown time Self Yes Yes   Sig: Take 200 mg by mouth two (2) times a day. hydroxychloroquine (PLAQUENIL) 200 mg tablet 4/18/2019 at Unknown time  No Yes   Sig: TAKE 2 TABLETS BY MOUTH DAILY   losartan (COZAAR) 100 mg tablet 4/18/2019 at Unknown time  Yes Yes   minoxidil (LONITEN) 2.5 mg tablet 4/18/2019 at Unknown time  Yes Yes   ondansetron (ZOFRAN ODT) 8 mg disintegrating tablet Unknown at Unknown time  No No   Sig: Take 1 Tab by mouth every eight (8) hours as needed for Nausea.    oxyCODONE-acetaminophen (PERCOCET) 5-325 mg per tablet 4/18/2019 at Unknown time  No Yes   Sig: Take 1 Tab by mouth two (2) times daily as needed for Pain (severe back pain) for up to 30 days. Max Daily Amount: 2 Tabs. pantoprazole (PROTONIX) 40 mg tablet 2019 at Unknown time Self No Yes   Sig: Take 1 Tab by mouth Daily (before breakfast). polyethylene glycol (MIRALAX) 17 gram packet Unknown at Unknown time  Yes No   Sig: Take 17 g by mouth daily as needed. predniSONE (DELTASONE) 5 mg tablet 2019 at Unknown time  No Yes   Sig: Take 2 Tabs by mouth daily. senna (SENNA) 8.6 mg tablet 2019 at Unknown time  Yes Yes   Sig: Take 1 Tab by mouth daily as needed for Constipation. for constipation      Facility-Administered Medications: None     Allergies   Allergen Reactions    Compazine [Prochlorperazine Edisylate] Nausea and Vomiting and Palpitations    Compazine [Prochlorperazine] Other (comments)     Hot and lightheaded. Review of Systems:  10 point ROS,  A comprehensive review of systems was negative except for: Genitourinary: positive for abnormal menstrual periods and prolinged vaginal bleeding     Objective:     Patient Vitals for the past 8 hrs:   BP Temp Pulse Resp SpO2   19 1206 112/69 -- 96 -- --   19 1118 90/52 97.4 °F (36.3 °C) 99 15 100 %   19 0708 97/61 97.8 °F (36.6 °C) (!) 105 18 95 %     Temp (24hrs), Av.4 °F (36.9 °C), Min:97.4 °F (36.3 °C), Max:98.9 °F (37.2 °C)  Gen:  WNWD AA female in no apparent distress  Lungs:  CTA b.l  CV:  RRR  Abd:  Soft NT, ND, no rebound  Ext: tr edema  Pelvic:  declined  No intake/output data recorded.     Physical Exam:   Visit Vitals  /69   Pulse 96   Temp 97.4 °F (36.3 °C)   Resp 15   Ht 5' 5\" (1.651 m)   Wt 76.8 kg (169 lb 5 oz)   LMP 2019 (Exact Date)   SpO2 100%   BMI 28.18 kg/m²       Labs:    Recent Results (from the past 24 hour(s))   CBC WITH AUTOMATED DIFF    Collection Time: 19  7:49 PM   Result Value Ref Range    WBC 3.5 (L) 3.6 - 11.0 K/uL    RBC 2.77 (L) 3.80 - 5.20 M/uL    HGB 7.8 (L) 11.5 - 16.0 g/dL    HCT 24.5 (L) 35.0 - 47.0 %    MCV 88.4 80.0 - 99.0 FL MCH 28.2 26.0 - 34.0 PG    MCHC 31.8 30.0 - 36.5 g/dL    RDW 14.6 (H) 11.5 - 14.5 %    PLATELET 673 575 - 207 K/uL    MPV 10.2 8.9 - 12.9 FL    NRBC 0.0 0  WBC    ABSOLUTE NRBC 0.00 0.00 - 0.01 K/uL    NEUTROPHILS 83 (H) 32 - 75 %    LYMPHOCYTES 12 12 - 49 %    MONOCYTES 5 5 - 13 %    EOSINOPHILS 0 0 - 7 %    BASOPHILS 0 0 - 1 %    IMMATURE GRANULOCYTES 0 0.0 - 0.5 %    ABS. NEUTROPHILS 2.9 1.8 - 8.0 K/UL    ABS. LYMPHOCYTES 0.4 (L) 0.8 - 3.5 K/UL    ABS. MONOCYTES 0.2 0.0 - 1.0 K/UL    ABS. EOSINOPHILS 0.0 0.0 - 0.4 K/UL    ABS. BASOPHILS 0.0 0.0 - 0.1 K/UL    ABS. IMM. GRANS. 0.0 0.00 - 0.04 K/UL    DF AUTOMATED      RBC COMMENTS ANISOCYTOSIS  1+       METABOLIC PANEL, COMPREHENSIVE    Collection Time: 04/18/19  7:49 PM   Result Value Ref Range    Sodium 137 136 - 145 mmol/L    Potassium 4.7 3.5 - 5.1 mmol/L    Chloride 95 (L) 97 - 108 mmol/L    CO2 35 (H) 21 - 32 mmol/L    Anion gap 7 5 - 15 mmol/L    Glucose 97 65 - 100 mg/dL    BUN 17 6 - 20 MG/DL    Creatinine 5.04 (H) 0.55 - 1.02 MG/DL    BUN/Creatinine ratio 3 (L) 12 - 20      GFR est AA 12 (L) >60 ml/min/1.73m2    GFR est non-AA 10 (L) >60 ml/min/1.73m2    Calcium 8.6 8.5 - 10.1 MG/DL    Bilirubin, total 0.6 0.2 - 1.0 MG/DL    ALT (SGPT) 16 12 - 78 U/L    AST (SGOT) 37 15 - 37 U/L    Alk.  phosphatase 82 45 - 117 U/L    Protein, total 8.8 (H) 6.4 - 8.2 g/dL    Albumin 3.5 3.5 - 5.0 g/dL    Globulin 5.3 (H) 2.0 - 4.0 g/dL    A-G Ratio 0.7 (L) 1.1 - 2.2     TYPE & SCREEN    Collection Time: 04/18/19  7:49 PM   Result Value Ref Range    Crossmatch Expiration 04/21/2019     ABO/Rh(D) Verbrada Antidot POSITIVE     Antibody screen NEG    HCG QL SERUM    Collection Time: 04/18/19  7:49 PM   Result Value Ref Range    HCG, Ql. NEGATIVE  NEG     CBC WITH AUTOMATED DIFF    Collection Time: 04/19/19  3:49 AM   Result Value Ref Range    WBC 2.3 (L) 3.6 - 11.0 K/uL    RBC 2.69 (L) 3.80 - 5.20 M/uL    HGB 7.4 (L) 11.5 - 16.0 g/dL    HCT 24.8 (L) 35.0 - 47.0 %    MCV 92.2 80.0 - 99.0 FL    MCH 27.5 26.0 - 34.0 PG    MCHC 29.8 (L) 30.0 - 36.5 g/dL    RDW 14.9 (H) 11.5 - 14.5 %    PLATELET 531 337 - 009 K/uL    MPV 10.4 8.9 - 12.9 FL    NRBC 0.0 0  WBC    ABSOLUTE NRBC 0.00 0.00 - 0.01 K/uL    NEUTROPHILS 63 32 - 75 %    LYMPHOCYTES 25 12 - 49 %    MONOCYTES 11 5 - 13 %    EOSINOPHILS 0 0 - 7 %    BASOPHILS 0 0 - 1 %    IMMATURE GRANULOCYTES 0 0.0 - 0.5 %    ABS. NEUTROPHILS 1.5 (L) 1.8 - 8.0 K/UL    ABS. LYMPHOCYTES 0.6 (L) 0.8 - 3.5 K/UL    ABS. MONOCYTES 0.3 0.0 - 1.0 K/UL    ABS. EOSINOPHILS 0.0 0.0 - 0.4 K/UL    ABS. BASOPHILS 0.0 0.0 - 0.1 K/UL    ABS. IMM.  GRANS. 0.0 0.00 - 0.04 K/UL    DF AUTOMATED     PROTHROMBIN TIME + INR    Collection Time: 04/19/19  3:49 AM   Result Value Ref Range    INR 1.1 0.9 - 1.1      Prothrombin time 11.5 (H) 9.0 - 11.1 sec   PTT    Collection Time: 04/19/19  3:49 AM   Result Value Ref Range    aPTT 30.4 22.1 - 32.0 sec    aPTT, therapeutic range     58.0 - 49.7 SECS   METABOLIC PANEL, BASIC    Collection Time: 04/19/19  3:49 AM   Result Value Ref Range    Sodium 134 (L) 136 - 145 mmol/L    Potassium 4.4 3.5 - 5.1 mmol/L    Chloride 94 (L) 97 - 108 mmol/L    CO2 33 (H) 21 - 32 mmol/L    Anion gap 7 5 - 15 mmol/L    Glucose 92 65 - 100 mg/dL    BUN 23 (H) 6 - 20 MG/DL    Creatinine 5.93 (H) 0.55 - 1.02 MG/DL    BUN/Creatinine ratio 4 (L) 12 - 20      GFR est AA 10 (L) >60 ml/min/1.73m2    GFR est non-AA 8 (L) >60 ml/min/1.73m2    Calcium 8.4 (L) 8.5 - 10.1 MG/DL   MAGNESIUM    Collection Time: 04/19/19  3:49 AM   Result Value Ref Range    Magnesium 2.1 1.6 - 2.4 mg/dL   LACTIC ACID    Collection Time: 04/19/19  3:49 AM   Result Value Ref Range    Lactic acid 0.8 0.4 - 2.0 MMOL/L   TROPONIN I    Collection Time: 04/19/19  3:49 AM   Result Value Ref Range    Troponin-I, Qt. <0.05 <0.05 ng/mL   GLUCOSE, POC    Collection Time: 04/19/19  6:30 AM   Result Value Ref Range    Glucose (POC) 90 65 - 100 mg/dL    Performed by Josie Grant    EKG, 12 LEAD, INITIAL    Collection Time: 19  8:20 AM   Result Value Ref Range    Ventricular Rate 104 BPM    Atrial Rate 104 BPM    P-R Interval 142 ms    QRS Duration 90 ms    Q-T Interval 378 ms    QTC Calculation (Bezet) 497 ms    Calculated P Axis 48 degrees    Calculated R Axis 43 degrees    Calculated T Axis 30 degrees    Diagnosis       Sinus tachycardia  Nonspecific T wave abnormality  When compared with ECG of 04-MAR-2019 18:21,  MS interval has decreased  Criteria for Septal infarct are no longer present  T wave inversion no longer evident in Anterior leads  QT has lengthened         Imaging: CT scan none and ultrasound showed endometrial stripe 0.6 cm and a small right hemorrhagic ovarian cyst.    Assessment:   35 yo  with anovulatory vaginal bleeding, chronic anemia and small right ovarian hemorrhagic cyst    Bleeding is improving per patient. We discussed anovulatory bleeding in relation to ovarian cyst and prolonged menses. She has seen Dr. Molly Rios in the past and plans to follow up with him as an outpatient. With PE history, we discussed cyclic provera withdrawal for missed menses to avoid exacerbating her chronic anemia. She is feeling better and agrees with the plan. Active Problems:    Vaginal bleeding (2019)        Plan:     I reviewed with Tutu Dasilva her medical records, physical exam, and review of symptoms.      All questions answered    Signed By: Clovis Fox MD     2019

## 2019-04-19 NOTE — ED NOTES
Per d/c order via Maria R pt declined transvaginal US. MD to bedside to clarify with pt. Pt agreeable to US at this time. [facility] notified.

## 2019-04-20 LAB
ALBUMIN SERPL-MCNC: 3 G/DL (ref 3.5–5)
ALBUMIN/GLOB SERPL: 0.6 {RATIO} (ref 1.1–2.2)
ALP SERPL-CCNC: 75 U/L (ref 45–117)
ALT SERPL-CCNC: 15 U/L (ref 12–78)
ANION GAP SERPL CALC-SCNC: 12 MMOL/L (ref 5–15)
AST SERPL-CCNC: 38 U/L (ref 15–37)
BILIRUB SERPL-MCNC: 0.5 MG/DL (ref 0.2–1)
BUN SERPL-MCNC: 39 MG/DL (ref 6–20)
BUN/CREAT SERPL: 4 (ref 12–20)
CALCIUM SERPL-MCNC: 8.4 MG/DL (ref 8.5–10.1)
CHLORIDE SERPL-SCNC: 90 MMOL/L (ref 97–108)
CO2 SERPL-SCNC: 29 MMOL/L (ref 21–32)
CREAT SERPL-MCNC: 8.84 MG/DL (ref 0.55–1.02)
ERYTHROCYTE [DISTWIDTH] IN BLOOD BY AUTOMATED COUNT: 15 % (ref 11.5–14.5)
GLOBULIN SER CALC-MCNC: 4.8 G/DL (ref 2–4)
GLUCOSE SERPL-MCNC: 89 MG/DL (ref 65–100)
HBV SURFACE AG SER QL: <0.1 INDEX
HBV SURFACE AG SER QL: NEGATIVE
HCT VFR BLD AUTO: 21.7 % (ref 35–47)
HCT VFR BLD AUTO: 31.9 % (ref 35–47)
HGB BLD-MCNC: 10 G/DL (ref 11.5–16)
HGB BLD-MCNC: 6.5 G/DL (ref 11.5–16)
MAGNESIUM SERPL-MCNC: 2.2 MG/DL (ref 1.6–2.4)
MCH RBC QN AUTO: 27.7 PG (ref 26–34)
MCHC RBC AUTO-ENTMCNC: 30 G/DL (ref 30–36.5)
MCV RBC AUTO: 92.3 FL (ref 80–99)
NRBC # BLD: 0 K/UL (ref 0–0.01)
NRBC BLD-RTO: 0 PER 100 WBC
PHOSPHATE SERPL-MCNC: 8.5 MG/DL (ref 2.6–4.7)
PLATELET # BLD AUTO: 172 K/UL (ref 150–400)
PMV BLD AUTO: 9.9 FL (ref 8.9–12.9)
POTASSIUM SERPL-SCNC: 4.7 MMOL/L (ref 3.5–5.1)
PROT SERPL-MCNC: 7.8 G/DL (ref 6.4–8.2)
RBC # BLD AUTO: 2.35 M/UL (ref 3.8–5.2)
SODIUM SERPL-SCNC: 131 MMOL/L (ref 136–145)
WBC # BLD AUTO: 2.5 K/UL (ref 3.6–11)

## 2019-04-20 PROCEDURE — 74011250636 HC RX REV CODE- 250/636: Performed by: INTERNAL MEDICINE

## 2019-04-20 PROCEDURE — 5A1D70Z PERFORMANCE OF URINARY FILTRATION, INTERMITTENT, LESS THAN 6 HOURS PER DAY: ICD-10-PCS | Performed by: INTERNAL MEDICINE

## 2019-04-20 PROCEDURE — 90935 HEMODIALYSIS ONE EVALUATION: CPT

## 2019-04-20 PROCEDURE — 80053 COMPREHEN METABOLIC PANEL: CPT

## 2019-04-20 PROCEDURE — 36415 COLL VENOUS BLD VENIPUNCTURE: CPT

## 2019-04-20 PROCEDURE — 84100 ASSAY OF PHOSPHORUS: CPT

## 2019-04-20 PROCEDURE — P9016 RBC LEUKOCYTES REDUCED: HCPCS

## 2019-04-20 PROCEDURE — 65660000001 HC RM ICU INTERMED STEPDOWN

## 2019-04-20 PROCEDURE — 83735 ASSAY OF MAGNESIUM: CPT

## 2019-04-20 PROCEDURE — 85018 HEMOGLOBIN: CPT

## 2019-04-20 PROCEDURE — 87340 HEPATITIS B SURFACE AG IA: CPT

## 2019-04-20 PROCEDURE — 74011250637 HC RX REV CODE- 250/637: Performed by: FAMILY MEDICINE

## 2019-04-20 PROCEDURE — 30233N1 TRANSFUSION OF NONAUTOLOGOUS RED BLOOD CELLS INTO PERIPHERAL VEIN, PERCUTANEOUS APPROACH: ICD-10-PCS | Performed by: INTERNAL MEDICINE

## 2019-04-20 PROCEDURE — 85027 COMPLETE CBC AUTOMATED: CPT

## 2019-04-20 PROCEDURE — 36430 TRANSFUSION BLD/BLD COMPNT: CPT

## 2019-04-20 RX ADMIN — EPOETIN ALFA-EPBX 14000 UNITS: 10000 INJECTION, SOLUTION INTRAVENOUS; SUBCUTANEOUS at 22:02

## 2019-04-20 RX ADMIN — Medication 10 ML: at 13:50

## 2019-04-20 RX ADMIN — OXYCODONE AND ACETAMINOPHEN 1 TABLET: 5; 325 TABLET ORAL at 11:31

## 2019-04-20 RX ADMIN — OXYCODONE AND ACETAMINOPHEN 1 TABLET: 5; 325 TABLET ORAL at 00:43

## 2019-04-20 RX ADMIN — Medication 10 ML: at 07:27

## 2019-04-20 RX ADMIN — Medication 10 ML: at 22:02

## 2019-04-20 NOTE — PROGRESS NOTES
Hospitalist Progress Note  Sherita Cazares MD  Answering service: [phone] OR [phone] from in house phone  Cell:       Date of Service:  2019  NAME:  Eliezer Morton  :  1985  MRN:  [id number]      Admission Summary:   Eliezer Morton is a 59-year-old [facility] American female with past medical  history of anemia, lupus, end-stage renal disease, on hemodialysis; carditis, asthma, chronic pain, degenerative disk disease, GERD, hypertension, hyperlipidemia, PE, long term anticoagulation on Eliquis presented as a direct admission/transfer from [facility] Emergency Department to [facility] with the patient's chief complaint of abdominal pain and   vaginal bleeding. The patient initially went to [facility] Emergency Department yesterday with complaints of irregular, heavy, vaginal bleeding over the past two weeks, dizziness over the past week, and onset of lower quadrant cramping, constant, mild abdominal pain (worsened and now severe rated 8/10). Per ED reports, patient has been changing per vaginal pads ~ 5 - 6 times per day. Patient is on Eliquis. Of note, patient was last hospitalized from 3/4/2019 - 3/6/2019 with diagnosis of abdominal pain and hyperkalemia. Interval history / Subjective:   Pt with h/o ESRD,admitted for vaginal bleeding and noted today with Hgb 6. 5. Receiving 2 PRBCs. GYN saw patient,will follow outpatient. C/o pain,saying that the pain meds she takes at home does not help much. Assessment & Plan:     1. Vaginal bleeding.    -Consulted gynecologist and saw patient. Discussed anovulatory bleeding in relation to ovarian cyst and prolonged menses. Also discussed cyclic provera withdrawal for missed menses to avoid exacerbating her chronic anemia. Pt agreed and will follow on outpatient.  -Will continue to monitor H/H    2. Acute anemia due to blood loss.   -Hbg 6.5 today compared to 7.4 yesterday  -Received 2 PRBCs today. Repeat lab    3. Abdominal pain. Will provide pain management while inpatient with supportive cares.     4. Tachycardia. Plan as above. -Tele-monitoring     5. Hemorrhagic right ovarian cyst.  Plan as above. Consult GYN.     6.Leukopenia.  -Improving. No evidence of infection     7. ESRD. Consult nephrologist for hemodialysis. -Had dialysis today     8. History of PE- on long term anti-coagulation with Eliquis. Assess for need for continue long term anti-coagulation with last CTA chest negative for PE.     9. Hypertension. Currently controlled and BP may be decreased with current narcotic pain therapy. As such, repeat BP and restart home BP medications if BP increases.     10. H/o lupus. On immunosuppressant therapy. Resume home medication.     11. VTE prophylaxis. SCDs to BLEs. Code status:full code  DVT prophylaxis:scds  Care Plan discussed with: Patient/Family and Nurse  Disposition: TBD     Hospital Problems  Date Reviewed: 3/4/2019          Codes Class Noted POA    Vaginal bleeding ICD-10-CM: N93.9  ICD-9-CM: 623.8  4/18/2019 Unknown                Review of Systems:   A comprehensive review of systems was negative except for that written in the HPI. Vital Signs:    Last 24hrs VS reviewed since prior progress note. Most recent are:  Visit Vitals  /63 (BP 1 Location: Left leg, BP Patient Position: At rest;Supine)   Pulse (!) 115   Temp 98.6 °F (37 °C)   Resp 13   Ht 5' 5\" (1.651 m)   Wt 78.2 kg (172 lb 6.4 oz)   SpO2 97%   BMI 28.69 kg/m²         Intake/Output Summary (Last 24 hours) at 4/20/2019 1302  Last data filed at 4/20/2019 1030  Gross per 24 hour   Intake 840 ml   Output 3000 ml   Net -2160 ml        Physical Examination:           Constitutional:  No acute distress, cooperative, pleasant    ENT:  Oral mucous moist, oropharynx benign. Neck supple,    Resp:  CTA bilaterally. No wheezing/rhonchi/rales.  No accessory muscle use   CV:  Regular rhythm, normal rate, no murmurs, gallops, rubs    GI:  Soft, non distended. Tender lower abdomen,no guarding or rebound. Normoactive bowel sounds, no hepatosplenomegaly     Musculoskeletal:  No edema, warm, 2+ pulses throughout    Neurologic:  Moves all extremities. AAOx3, CN II-XII reviewed     Psych:  Good insight, Not anxious nor agitated. Data Review:    Review and/or order of clinical lab test      Labs:     Recent Labs     04/20/19 0420 04/19/19 0349   WBC 2.5* 2.3*   HGB 6.5* 7.4*   HCT 21.7* 24.8*    173     Recent Labs     04/20/19 0420 04/19/19 0349 04/18/19 1949   * 134* 137   K 4.7 4.4 4.7   CL 90* 94* 95*   CO2 29 33* 35*   BUN 39* 23* 17   CREA 8.84* 5.93* 5.04*   GLU 89 92 97   CA 8.4* 8.4* 8.6   MG 2.2 2.1  --    PHOS 8.5*  --   --      Recent Labs     04/20/19 0420 04/18/19 1949   SGOT 38* 37   ALT 15 16   AP 75 82   TBILI 0.5 0.6   TP 7.8 8.8*   ALB 3.0* 3.5   GLOB 4.8* 5.3*     Recent Labs     04/19/19 0349   INR 1.1   PTP 11.5*   APTT 30.4      No results for input(s): FE, TIBC, PSAT, FERR in the last 72 hours. Lab Results   Component Value Date/Time    Folate 13.7 06/17/2018 03:00 PM      No results for input(s): PH, PCO2, PO2 in the last 72 hours.   Recent Labs     04/19/19 0349   TROIQ <0.05     Lab Results   Component Value Date/Time    Cholesterol, total 140 09/11/2018 06:17 AM    HDL Cholesterol 43 09/11/2018 06:17 AM    LDL, calculated 60.4 09/11/2018 06:17 AM    Triglyceride 183 (H) 09/11/2018 06:17 AM    CHOL/HDL Ratio 3.3 09/11/2018 06:17 AM     Lab Results   Component Value Date/Time    Glucose (POC) 90 04/19/2019 06:30 AM    Glucose (POC) 84 03/05/2019 08:11 AM    Glucose (POC) 104 (H) 08/16/2018 11:06 AM    Glucose (POC) 164 (H) 07/22/2018 11:43 AM    Glucose (POC) 138 (H) 07/21/2018 09:22 PM     Lab Results   Component Value Date/Time    Color YELLOW/STRAW 03/31/2019 06:07 PM    Appearance CLEAR 03/31/2019 06:07 PM    Specific gravity 1.015 03/31/2019 06:07 PM Specific gravity 1.017 08/12/2016 09:06 PM    pH (UA) 8.5 (H) 03/31/2019 06:07 PM    Protein 100 (A) 03/31/2019 06:07 PM    Glucose NEGATIVE  03/31/2019 06:07 PM    Ketone NEGATIVE  03/31/2019 06:07 PM    Bilirubin NEGATIVE  03/31/2019 06:07 PM    Urobilinogen 0.2 03/31/2019 06:07 PM    Nitrites NEGATIVE  03/31/2019 06:07 PM    Leukocyte Esterase NEGATIVE  03/31/2019 06:07 PM    Epithelial cells MODERATE (A) 03/31/2019 06:07 PM    Bacteria NEGATIVE  03/31/2019 06:07 PM    WBC 0-4 03/31/2019 06:07 PM    RBC 0-5 03/31/2019 06:07 PM         Medications Reviewed:     Current Facility-Administered Medications   Medication Dose Route Frequency    epoetin pia-epbx (RETACRIT) 14,000 Units combo injection  14,000 Units SubCUTAneous Q TUE, THU & SAT    sodium chloride (NS) flush 5-40 mL  5-40 mL IntraVENous Q8H    sodium chloride (NS) flush 5-40 mL  5-40 mL IntraVENous PRN    oxyCODONE-acetaminophen (PERCOCET) 5-325 mg per tablet 1 Tab  1 Tab Oral BID PRN    0.9% sodium chloride infusion 250 mL  250 mL IntraVENous PRN     ______________________________________________________________________  EXPECTED LENGTH OF STAY: 2d 14h  ACTUAL LENGTH OF STAY:          2                 Meryle Mark, MD

## 2019-04-20 NOTE — PROGRESS NOTES
Bedside and Verbal shift change report given to Erma Foy RN (oncoming nurse) by Anais Julien RN (offgoing nurse). Report included the following information SBAR, Kardex, Intake/Output, Recent Results and Cardiac Rhythm NSR.

## 2019-04-20 NOTE — PROGRESS NOTES
Problem: Anemia Care Plan (Adult and Pediatric)  Goal: *Labs within defined limits  Outcome: Progressing Towards Goal   Pt with decreased hemoglobin, pt receiving 2 units of blood with dialysis, pt tolerating well. Consent signed and in chart, pt educated on transfusion including side effects, pt verbalized understanding. Will continue to monitor and educate. Bedside shift change report given to [facility] RN (oncoming nurse) by Jimena Monroe (offgoing nurse). Report included the following information SBAR, Kardex, ED Summary, Procedure Summary, Intake/Output, MAR, Recent Results, Med Rec Status, Cardiac Rhythm Sinus Tach, Alarm Parameters  and Quality Measures.

## 2019-04-20 NOTE — PROGRESS NOTES
Patient name: Pratibha Hollis  MRN: [id number]    Nephrology Progress note:    Assessment:  ESRD: TTS [facility] [place] [facility]  HTN: Stable  Anemia 2 to ESRD: Hgb suboptimal=> 2units of PRBCs today with HD  Vaginal bleeding:  Possibly 2 to hemorrhagic ovarian cyst.   SLE    Plan/Recommendations:  Next HD 4/23  Trend Hgb. Repeat level later today  GYN consult  REnally adjust new meds  Am labs    Will see again on Monday. Call us if needed tomorrow      Subjective:  Seen at the end of HD at 10:20pm. 2units of PRBCs given. Mild tachycardia  Patient reports ongoing vaginal bleeding/clots overnight. ROS:   No nausea, no vomiting  No chest pain, no shortness of breath    Exam:  Visit Vitals  /63 (BP 1 Location: Left leg, BP Patient Position: At rest;Supine)   Pulse (!) 115   Temp 98.6 °F (37 °C)   Resp 13   Ht 5' 5\" (1.651 m)   Wt 78.2 kg (172 lb 6.4 oz)   LMP 04/07/2019 (Exact Date)   SpO2 97%   BMI 28.69 kg/m²     Wt Readings from Last 3 Encounters:   04/20/19 78.2 kg (172 lb 6.4 oz)   04/08/19 83.6 kg (184 lb 3.2 oz)   04/03/19 85.3 kg (188 lb)       Intake/Output Summary (Last 24 hours) at 4/20/2019 1112  Last data filed at 4/20/2019 1015  Gross per 24 hour   Intake 920 ml   Output 3000 ml   Net -2080 ml       Gen: NAD  HEENT:  AT/NC  Lungs/Chest wall: Clear. No accessory muscle use. Cardiovascular: Tachycardic  Abdomen/: Soft, NT, ND, BS+.    Ext:  No peripheral edema      Current Facility-Administered Medications   Medication Dose Route Frequency Last Dose    sodium chloride (NS) flush 5-40 mL  5-40 mL IntraVENous Q8H 10 mL at 04/20/19 0727    sodium chloride (NS) flush 5-40 mL  5-40 mL IntraVENous PRN      oxyCODONE-acetaminophen (PERCOCET) 5-325 mg per tablet 1 Tab  1 Tab Oral BID PRN 1 Tab at 04/20/19 0043    0.9% sodium chloride infusion 250 mL  250 mL IntraVENous PRN         Labs/Data:    Lab Results   Component Value Date/Time    WBC 2.5 (L) 04/20/2019 04:20 AM    Hemoglobin (POC) 7.8 (L) 01/19/2018 12:30 PM    HGB 6.5 (L) 04/20/2019 04:20 AM    Hematocrit (POC) 23 (L) 01/19/2018 12:30 PM    HCT 21.7 (L) 04/20/2019 04:20 AM    PLATELET 606 87/90/4308 04:20 AM    MCV 92.3 04/20/2019 04:20 AM       Lab Results   Component Value Date/Time    Sodium 131 (L) 04/20/2019 04:20 AM    Potassium 4.7 04/20/2019 04:20 AM    Chloride 90 (L) 04/20/2019 04:20 AM    CO2 29 04/20/2019 04:20 AM    Anion gap 12 04/20/2019 04:20 AM    Glucose 89 04/20/2019 04:20 AM    BUN 39 (H) 04/20/2019 04:20 AM    Creatinine 8.84 (H) 04/20/2019 04:20 AM    BUN/Creatinine ratio 4 (L) 04/20/2019 04:20 AM    GFR est AA 6 (L) 04/20/2019 04:20 AM    GFR est non-AA 5 (L) 04/20/2019 04:20 AM    Calcium 8.4 (L) 04/20/2019 04:20 AM       Patient seen and examined. Chart reviewed. Labs, data and other pertinent notes reviewed in last 24 hrs.     Discussed with patient and HD RN    Signed by:  Matias Aguilera MD

## 2019-04-20 NOTE — PROGRESS NOTES
Problem: Pressure Injury - Risk of  Goal: *Prevention of pressure injury  Description  Document Vikash Scale and appropriate interventions in the flowsheet. Outcome: Progressing Towards Goal  Note:   Pressure Injury Interventions: Activity Interventions: Increase time out of bed, Pressure redistribution bed/mattress(bed type), Assess need for specialty bed         Nutrition Interventions: Document food/fluid/supplement intake, Offer support with meals,snacks and hydration                     Problem: Falls - Risk of  Goal: *Absence of Falls  Description  Document Chris Cruz Fall Risk and appropriate interventions in the flowsheet.   Outcome: Progressing Towards Goal  Note:   Fall Risk Interventions:            Medication Interventions: Evaluate medications/consider consulting pharmacy, Patient to call before getting OOB, Teach patient to arise slowly

## 2019-04-20 NOTE — ADVANCED PRACTICE NURSE
[facility] Dialysis Team [facility]  [phone]    Vitals   Pre   Post   Assessment   Pre   Post     Temp  Temp: 97.5 °F (36.4 °C) (04/20/19 0710)  98.5 LOC  A&O x 3 A&O x 3   HR   Pulse (Heart Rate): (!) 101(restying, watching TV) (04/20/19 0845) 110 Lungs   congestion  clear   B/P   BP: 152/66 (04/20/19 0845) 150/91 Cardiac   regular  regular   Resp   Resp Rate: 16 (04/20/19 0845) 18 Skin   warm  warm   Pain level  Pain Intensity 1: 0 (04/20/19 0451) No pain Edema  generalized     generalized   Orders:    Duration:   Start:    0715 End:    1015 Total:   3 hr   Dialyzer:   Dialyzer/Set Up Inspection: Mo([id number]) (04/20/19 0710)   K Bath:   Dialysate K (mEq/L): 2 (04/20/19 0710)   Ca Bath:   Dialysate CA (mEq/L): 2.5 (04/20/19 0710)   Na/Bicarb:   Dialysate NA (mEq/L): 140 (04/20/19 0710)   Target Fluid Removal:   Goal/Amount of Fluid to Remove (mL): 2500 mL (04/20/19 0710)   Access     Type & Location:   PETER AVG patent, positive for B&T, cannulated with 15 g needles x 2, positive for NS flushes and asspirations.     Labs     Obtained/Reviewed   Critical Results Called   Date when labs were drawn-  Hgb-    HGB   Date Value Ref Range Status   04/20/2019 6.5 (L) 11.5 - 16.0 g/dL Final     Comment:     RESULTS REPEATED     K-    Potassium   Date Value Ref Range Status   04/20/2019 4.7 3.5 - 5.1 mmol/L Final     Ca-   Calcium   Date Value Ref Range Status   04/20/2019 8.4 (L) 8.5 - 10.1 MG/DL Final     Bun-   BUN   Date Value Ref Range Status   04/20/2019 39 (H) 6 - 20 MG/DL Final     Creat-   Creatinine   Date Value Ref Range Status   04/20/2019 8.84 (H) 0.55 - 1.02 MG/DL Final     Comment:     INVESTIGATED PER DELTA CHECK PROTOCOL        Medications/ Blood Products Given     Name   Dose   Route and Time     Two units PRBC   IVPB             Blood Volume Processed (BVP):    66 L Net Fluid   Removed:  2500 ml   Comments   Time Out Done: yes, 0710  Primary Nurse Rpt Pre: Dulce Orlando RN  Primary Nurse Rpt Post: Anabel Ramos RN  Pt Education: yes, r/t dialysis process  Care Plan: continue HD as ordered  Tx Summary: tolerated tx well, at end, all blood in circuit returned with 300 ml NS, two units of PRBC given, no adwerse reaction, bleeding stopped at both sites, pressure dressing in place, no S/S of bleeding. Admiting Diagnosis: renal failure  Pt's previous clinic-  Consent signed - Informed Consent Verified: Yes (04/20/19 0710)  [facility] Consent - yes  Hepatitis Status- negative  Machine #- Machine Number: [id number] (04/20/19 0710)  Telemetry status-bedside  Pre-dialysis wt. - Pre-Dialysis Weight: 80.6 kg (177 lb 11.1 oz) (04/20/19 0710) Ana Cortes)

## 2019-04-21 VITALS
WEIGHT: 175.27 LBS | HEART RATE: 96 BPM | BODY MASS INDEX: 29.2 KG/M2 | HEIGHT: 65 IN | RESPIRATION RATE: 18 BRPM | TEMPERATURE: 97.4 F | SYSTOLIC BLOOD PRESSURE: 124 MMHG | OXYGEN SATURATION: 97 % | DIASTOLIC BLOOD PRESSURE: 71 MMHG

## 2019-04-21 PROBLEM — D50.0 ANEMIA DUE TO BLOOD LOSS: Status: ACTIVE | Noted: 2019-04-21

## 2019-04-21 LAB
ABO + RH BLD: NORMAL
ALBUMIN SERPL-MCNC: 3.2 G/DL (ref 3.5–5)
ALBUMIN/GLOB SERPL: 0.6 {RATIO} (ref 1.1–2.2)
ALP SERPL-CCNC: 89 U/L (ref 45–117)
ALT SERPL-CCNC: 18 U/L (ref 12–78)
ANION GAP SERPL CALC-SCNC: 9 MMOL/L (ref 5–15)
AST SERPL-CCNC: 41 U/L (ref 15–37)
BILIRUB SERPL-MCNC: 0.4 MG/DL (ref 0.2–1)
BLD PROD TYP BPU: NORMAL
BLD PROD TYP BPU: NORMAL
BLOOD GROUP ANTIBODIES SERPL: NORMAL
BPU ID: NORMAL
BPU ID: NORMAL
BUN SERPL-MCNC: 35 MG/DL (ref 6–20)
BUN/CREAT SERPL: 4 (ref 12–20)
CALCIUM SERPL-MCNC: 8.5 MG/DL (ref 8.5–10.1)
CHLORIDE SERPL-SCNC: 97 MMOL/L (ref 97–108)
CO2 SERPL-SCNC: 26 MMOL/L (ref 21–32)
CREAT SERPL-MCNC: 7.81 MG/DL (ref 0.55–1.02)
CROSSMATCH RESULT,%XM: NORMAL
CROSSMATCH RESULT,%XM: NORMAL
ERYTHROCYTE [DISTWIDTH] IN BLOOD BY AUTOMATED COUNT: 14.6 % (ref 11.5–14.5)
GLOBULIN SER CALC-MCNC: 5.1 G/DL (ref 2–4)
GLUCOSE SERPL-MCNC: 92 MG/DL (ref 65–100)
HCT VFR BLD AUTO: 28.9 % (ref 35–47)
HGB BLD-MCNC: 9 G/DL (ref 11.5–16)
MAGNESIUM SERPL-MCNC: 2.2 MG/DL (ref 1.6–2.4)
MCH RBC QN AUTO: 28.7 PG (ref 26–34)
MCHC RBC AUTO-ENTMCNC: 31.1 G/DL (ref 30–36.5)
MCV RBC AUTO: 92 FL (ref 80–99)
NRBC # BLD: 0 K/UL (ref 0–0.01)
NRBC BLD-RTO: 0 PER 100 WBC
PHOSPHATE SERPL-MCNC: 7 MG/DL (ref 2.6–4.7)
PLATELET # BLD AUTO: 156 K/UL (ref 150–400)
PMV BLD AUTO: 10 FL (ref 8.9–12.9)
POTASSIUM SERPL-SCNC: 4.7 MMOL/L (ref 3.5–5.1)
PROT SERPL-MCNC: 8.3 G/DL (ref 6.4–8.2)
RBC # BLD AUTO: 3.14 M/UL (ref 3.8–5.2)
SODIUM SERPL-SCNC: 132 MMOL/L (ref 136–145)
SPECIMEN EXP DATE BLD: NORMAL
STATUS OF UNIT,%ST: NORMAL
STATUS OF UNIT,%ST: NORMAL
UNIT DIVISION, %UDIV: 0
UNIT DIVISION, %UDIV: 0
WBC # BLD AUTO: 3 K/UL (ref 3.6–11)

## 2019-04-21 PROCEDURE — 83735 ASSAY OF MAGNESIUM: CPT

## 2019-04-21 PROCEDURE — 85027 COMPLETE CBC AUTOMATED: CPT

## 2019-04-21 PROCEDURE — 36415 COLL VENOUS BLD VENIPUNCTURE: CPT

## 2019-04-21 PROCEDURE — 74011250637 HC RX REV CODE- 250/637: Performed by: FAMILY MEDICINE

## 2019-04-21 PROCEDURE — 80053 COMPREHEN METABOLIC PANEL: CPT

## 2019-04-21 PROCEDURE — 84100 ASSAY OF PHOSPHORUS: CPT

## 2019-04-21 RX ADMIN — OXYCODONE AND ACETAMINOPHEN 1 TABLET: 5; 325 TABLET ORAL at 10:38

## 2019-04-21 RX ADMIN — Medication 10 ML: at 06:57

## 2019-04-21 NOTE — ROUTINE PROCESS
Discharge paperwork provided to patient. Educated patient on discharge instructions, medications, and follow up appointments. Patient verbalized understanding and denied questions.

## 2019-04-21 NOTE — ROUTINE PROCESS
Notified Dr. Mario Nicholas that patient reports pain in her abdomen that is unrelieved with PRN Percocet. Dr. Mario Nicholas stated that he will talk to the patient. No new orders received.

## 2019-04-21 NOTE — PROGRESS NOTES
Problem: Pressure Injury - Risk of  Goal: *Prevention of pressure injury  Description  Document Vikash Scale and appropriate interventions in the flowsheet. Outcome: Progressing Towards Goal  Note:   Pressure Injury Interventions: Activity Interventions: Assess need for specialty bed, Pressure redistribution bed/mattress(bed type), Increase time out of bed         Nutrition Interventions: Document food/fluid/supplement intake, Offer support with meals,snacks and hydration                     Problem: Falls - Risk of  Goal: *Absence of Falls  Description  Document Luke Wiseman Fall Risk and appropriate interventions in the flowsheet. Outcome: Progressing Towards Goal  Note:   Fall Risk Interventions:            Medication Interventions: Evaluate medications/consider consulting pharmacy, Patient to call before getting OOB, Teach patient to arise slowly                   Problem: Anemia Care Plan (Adult and Pediatric)  Goal: *Labs within defined limits  Outcome: Not Progressing Towards Goal  Note:   Pt. Hgb has decreased from 10.0 to 9.0 after receiving 2 units of PRBCs with dialysis 4-20-19. Will continue to monitor.

## 2019-04-21 NOTE — DISCHARGE SUMMARY
Discharge Summary    Patient: Patricia Coley               Sex: female          DOA: 4/18/2019         YOB: 1985      Age:  36 y.o.        LOS:  LOS: 3 days                Admit Date: 4/18/2019    Discharge Date: 4/21/2019    Admission Diagnoses: Vaginal bleeding [N93.9]    Discharge Diagnoses:    Problem List as of 4/21/2019 Date Reviewed: 3/4/2019          Codes Class Noted - Resolved    Anemia due to blood loss ICD-10-CM: D50.0  ICD-9-CM: 280.0  4/21/2019 - Present        Vaginal bleeding ICD-10-CM: N93.9  ICD-9-CM: 623.8  4/18/2019 - Present        Hyperkalemia ICD-10-CM: E87.5  ICD-9-CM: 276.7  3/4/2019 - Present        ESRD on hemodialysis ([facility].) ICD-10-CM: N18.6, Z99.2  ICD-9-CM: 585.6, V45.11  8/27/2018 - Present        SOB (shortness of breath) ICD-10-CM: R06.02  ICD-9-CM: 786.05  8/15/2018 - Present        Rib pain on right side ICD-10-CM: R07.81  ICD-9-CM: 786.50  7/2/2018 - Present        Troponin level elevated ICD-10-CM: R74.8  ICD-9-CM: 790.6  6/17/2018 - Present        Intra-abdominal abscess ([facility].) ICD-10-CM: K65.1  ICD-9-CM: 567.22  5/12/2018 - Present        Sepsis ([facility].) ICD-10-CM: A41.9  ICD-9-CM: 038.9, 995.91  4/29/2018 - Present        Generalized abdominal pain ICD-10-CM: R10.84  ICD-9-CM: 789.07  4/4/2018 - Present        Other constipation ICD-10-CM: K59.09  ICD-9-CM: 564.09  4/4/2018 - Present        Other ascites ICD-10-CM: R18.8  ICD-9-CM: 789.59  4/4/2018 - Present        Peritonitis ([facility] ICD-10-CM: K65.9  ICD-9-CM: 567.9  3/11/2018 - Present        History of pulmonary embolism ICD-10-CM: X60.023  ICD-9-CM: V12.55  12/8/2017 - Present        Hypomagnesemia ICD-10-CM: E83.42  ICD-9-CM: 275.2  10/30/2017 - Present        Hypocalcemia ICD-10-CM: E83.51  ICD-9-CM: 275.41  10/30/2017 - Present        Hypokalemia ICD-10-CM: E87.6  ICD-9-CM: 276.8  10/27/2017 - Present        Hypertension (Chronic) ICD-10-CM: I10  ICD-9-CM: 401.9  10/14/2017 - Present        Chronic bilateral low back pain without sciatica ICD-10-CM: M54.5, G89.29  ICD-9-CM: 724.2, 338.29  5/26/2017 - Present        Anemia of renal disease ICD-10-CM: N18.9, D63.1  ICD-9-CM: 285.21  2/21/2017 - Present        Dependence on peritoneal dialysis [facility]) ICD-10-CM: Z99.2  ICD-9-CM: V45.11  2/21/2017 - Present        ACP (advance care planning) ICD-10-CM: Z71.89  ICD-9-CM: V65.49  2/21/2017 - Present        Malignant hypertension ICD-10-CM: I10  ICD-9-CM: 401.0  7/11/2016 - Present        Encounter for monitoring opioid maintenance therapy ICD-10-CM: Z51.81, Z79.891  ICD-9-CM: V58.83, V58.69  11/3/2015 - Present        Lupus nephritis ([facility] ICD-10-CM: M32.14  ICD-9-CM: 710.0, 583.81  3/10/2012 - Present        Lupus ICD-10-CM: M32.9  ICD-9-CM: 710.0  2/27/2012 - Present        RESOLVED: Neutropenia ([facility] ICD-10-CM: D70.9  ICD-9-CM: 288.00  9/15/2018 - 9/19/2018        RESOLVED: Anemia in chronic kidney disease ICD-10-CM: N18.9, D63.1  ICD-9-CM: 285.21  9/15/2018 - 9/19/2018        RESOLVED: Malignant hypertensive urgency ICD-10-CM: I16.0  ICD-9-CM: 401.0  9/10/2018 - 9/19/2018        RESOLVED: Hypertensive urgency ICD-10-CM: I16.0  ICD-9-CM: 401.9  7/19/2018 - 7/22/2018        RESOLVED: Sepsis ([facility] ICD-10-CM: A41.9  ICD-9-CM: 038.9, 995.91  12/12/2017 - 12/22/2017        RESOLVED: Pneumonia ICD-10-CM: J18.9  ICD-9-CM: 426  10/14/2017 - 12/8/2017        RESOLVED: Pleural effusion, left ICD-10-CM: J90  ICD-9-CM: 511.9  10/14/2017 - 12/8/2017        RESOLVED: ESRD on peritoneal dialysis ([facility] (Chronic) ICD-10-CM: N18.6, Z99.2  ICD-9-CM: 585.6, V45.11  10/7/2016 - 8/27/2018        RESOLVED: Idiopathic chronic inflammatory bowel disease ICD-10-CM: K63.89  ICD-9-CM: 558.9  8/28/2013 - 8/28/2013        RESOLVED: Abdominal pain ICD-10-CM: R10.9  ICD-9-CM: 789.00  8/28/2013 - 9/3/2013        RESOLVED: Hypoxia ICD-10-CM: R09.02  ICD-9-CM: 799.02  4/25/2013 - 4/30/2013        RESOLVED: Lupus nephritis ([facility] ICD-10-CM: M32.14  ICD-9-CM: 710.0, 583.81  4/27/2012 - 4/28/2012              Discharge Medications:     Current Discharge Medication List      CONTINUE these medications which have NOT CHANGED    Details   amitriptyline (ELAVIL) 50 mg tablet TAKE 1 TABLET BY MOUTH EVERY NIGHT AT BEDTIME  Qty: 90 Tab, Refills: 4    Comments: **Patient requests 90 days supply**      minoxidil (LONITEN) 2.5 mg tablet       losartan (COZAAR) 100 mg tablet       !! hydroxychloroquine (PLAQUENIL) 200 mg tablet TAKE 2 TABLETS BY MOUTH DAILY  Qty: 180 Tab, Refills: 6    Comments: **Patient requests 90 days supply**      oxyCODONE-acetaminophen (PERCOCET) 5-325 mg per tablet Take 1 Tab by mouth two (2) times daily as needed for Pain (severe back pain) for up to 30 days. Max Daily Amount: 2 Tabs. Qty: 45 Tab, Refills: 0    Associated Diagnoses: Chronic bilateral low back pain without sciatica      ferric citrate (AURYXIA) 210 mg iron tablet Take 210 mg by mouth three (3) times daily (with meals). carvedilol (COREG) 25 mg tablet Take 1 Tab by mouth two (2) times daily (with meals). Qty: 60 Tab, Refills: 0      amLODIPine (NORVASC) 10 mg tablet Take 1 Tab by mouth daily. Qty: 30 Tab, Refills: 0      predniSONE (DELTASONE) 5 mg tablet Take 2 Tabs by mouth daily. Qty: 30 Tab, Refills: 0      senna (SENNA) 8.6 mg tablet Take 1 Tab by mouth daily as needed for Constipation. for constipation      gemfibrozil (LOPID) 600 mg tablet Take 300 mg by mouth daily. azaTHIOprine (IMURAN) 50 mg tablet Take 50 mg by mouth daily (after breakfast). !! hydroxychloroquine (PLAQUENIL) 200 mg tablet Take 200 mg by mouth two (2) times a day. pantoprazole (PROTONIX) 40 mg tablet Take 1 Tab by mouth Daily (before breakfast). Qty: 30 Tab, Refills: 0      ondansetron (ZOFRAN ODT) 8 mg disintegrating tablet Take 1 Tab by mouth every eight (8) hours as needed for Nausea.   Qty: 15 Tab, Refills: 0      acetaminophen (TYLENOL EXTRA STRENGTH) 500 mg tablet Take 2 Tabs by mouth every six (6) hours as needed for Pain. Qty: 30 Tab, Refills: 0      polyethylene glycol (MIRALAX) 17 gram packet Take 17 g by mouth daily as needed. albuterol (PROVENTIL HFA, VENTOLIN HFA, PROAIR HFA) 90 mcg/actuation inhaler Take 1-2 Puffs by inhalation every four (4) hours as needed for Wheezing or Shortness of Breath. Qty: 1 Inhaler, Refills: 2       !! - Potential duplicate medications found. Please discuss with provider. STOP taking these medications       apixaban (ELIQUIS) 5 mg tablet Comments:   Reason for Stopping: Follow-up:PCP,OB-GYN,Nephrology,Hematology    Discharge Condition: Good    Activity: Activity as tolerated    Diet: Resume previous diet    Wound Care: None needed    Disposition:Home    Labs:  Labs: Results:       Chemistry Recent Labs     04/21/19  0245 04/20/19  0420 04/19/19 0349 04/18/19 1949   GLU 92 89 92 97   * 131* 134* 137   K 4.7 4.7 4.4 4.7   CL 97 90* 94* 95*   CO2 26 29 33* 35*   BUN 35* 39* 23* 17   CREA 7.81* 8.84* 5.93* 5.04*   CA 8.5 8.4* 8.4* 8.6   AGAP 9 12 7 7   BUCR 4* 4* 4* 3*   AP 89 75  --  82   TP 8.3* 7.8  --  8.8*   ALB 3.2* 3.0*  --  3.5   GLOB 5.1* 4.8*  --  5.3*   AGRAT 0.6* 0.6*  --  0.7*      CBC w/Diff Recent Labs     04/21/19  0245 04/20/19  1356 04/20/19  0420 04/19/19  0349 04/18/19 1949   WBC 3.0*  --  2.5* 2.3* 3.5*   RBC 3.14*  --  2.35* 2.69* 2.77*   HGB 9.0* 10.0* 6.5* 7.4* 7.8*   HCT 28.9* 31.9* 21.7* 24.8* 24.5*     --  172 173 181   GRANS  --   --   --  63 83*   LYMPH  --   --   --  25 12   EOS  --   --   --  0 0      Cardiac Enzymes No results for input(s): CPK, CKND1, FRANCISCO in the last 72 hours.     No lab exists for component: CKRMB, TROIP   Coagulation Recent Labs     04/19/19  0349   PTP 11.5*   INR 1.1   APTT 30.4       Lipid Panel Lab Results   Component Value Date/Time    Cholesterol, total 140 09/11/2018 06:17 AM    HDL Cholesterol 43 09/11/2018 06:17 AM    LDL, calculated 60.4 09/11/2018 06:17 AM    VLDL, calculated 36.6 09/11/2018 06:17 AM    Triglyceride 183 (H) 09/11/2018 06:17 AM    CHOL/HDL Ratio 3.3 09/11/2018 06:17 AM      BNP No results for input(s): BNPP in the last 72 hours. Liver Enzymes Recent Labs     04/21/19  0245   TP 8.3*   ALB 3.2*   AP 89   SGOT 41*      Thyroid Studies Lab Results   Component Value Date/Time    TSH 1.70 09/11/2018 06:17 AM          Imaging:   US pelvis:on 4/18:  Possible hemorrhagic cyst in the right ovary. Recommend follow-up to ensure  resolution    Consults: Gynecology and Nephrology    Treatment Team: Treatment Team: Attending Provider: Faith Polanco MD; Consulting Provider: Woo Pittman MD; Consulting Provider: Nikkie Lagos MD; Consulting Provider: Beto Michael MD; Utilization Review: Irina Caal    Significant Diagnostic Studies: labs: see recent lab results    History of present illness  Duane Carpenter is a 59-year-old [facility] American female with past medical  history of anemia, lupus, end-stage renal disease, on hemodialysis; carditis, asthma, chronic pain, degenerative disk disease, GERD, hypertension, hyperlipidemia, PE, long term anticoagulation on Eliquis presented as a direct admission/transfer from [facility] Emergency Department to [facility] with the patient's chief complaint of abdominal pain and   vaginal bleeding. [facility] with complaints of irregular, heavy, vaginal bleeding over the past two weeks, dizziness over the past week, and onset of lower quadrant cramping, constant, mild abdominal pain (worsened and now severe rated 8/10). Per ED reports, patient has been changing per vaginal pads ~ 5 - 6 times per day. Patient is on Eliquis.   Of note, patient was last hospitalized from 3/4/2019 - 3/6/2019 with diagnosis of abdominal pain and hyperkalemia.       On initial presentation at [facility], initial vital signs were recorded as BP= 129/86, HR= 110, RR= 18, O2sat= 96% on room air. Patient reports that she has changed vaginal pad since arrival in the ED. ED MD request transfer to [facility] for admission to the hospitalist service. Patient was given Morphine 4 mg IV prior to transfer but patient notes that abdominal pain is not resolved. Patient is anuric. There were no reports of new onset syncope, loss of consciousness, headache, neck pain, visual disturbance, numbness, paresthesias, focal weakness, slurred speech, facial droop, chest pain, palpitations, shortness of breath, nausea, vomiting, diarrhea, constipation (last bowel movement yesterday), melena, calf pain, increased leg swelling/ edema, fever, chills, or rash. Hospital Course:  1. Vaginal bleeding.    -Consulted gynecologist and saw patient. Discussed anovulatory bleeding in relation to ovarian cyst and prolonged menses. Also discussed cyclic provera withdrawal for missed menses to avoid exacerbating her chronic anemia. Pt agreed and will follow on outpatient. -H/H monitored,Hgb 9.0  -No bleeding today. Patient will follow-up with gyn.     2. Acute anemia due to blood loss. -Hbg 6.5 today compared to 7.4 yesterday  -Received 2 PRBCs today. Repeat lab this am Hgb 9. 0. Patient saying that she did not see anymore vaginal bleeding     3. Abdominal pain.  Will provide pain management while inpatient with supportive cares.  -No abdominal pain today     4. Tachycardia.  Plan as above. -Tele-monitoring  - Improved     5. Hemorrhagic right ovarian cyst.  Plan as above.  Consult GYN.     6.Leukopenia.  -Improved. No evidence of infection  -WBC 3.0 today     7. ESRD.  Consult nephrologist for hemodialysis. -Had dialysis on 4/20.  -Continue dialysis as per routine     8. History of PE  -On long term anti-coagulation with Eliquis.   -V/Q scan in 2018,low probability for PE.  -Patient told that eliquis is on hold for now due to bleeding. Strongly advised to follow-up with pcp,hematologist to determine when to resume eliquis  -Patient says that she was put on eliquis 5 years ago for PE but did not follow up with her hematologist in recent months. She has promised that she will see her hematologist this week started tomorrow.     9. Hypertension.   Currently controlled and BP may be decreased with current narcotic pain therapy.  As such, repeat BP and restart home BP medications if BP increases.     10. H/o lupus.  On immunosuppressant therapy.  Resume home medication. Patient has been evaluated and noted clinically stable for discharge. Discharge plan discussed with patient,nurse. Patient says that she has all her medications,no prescription needed.       Time for discharge:40 minutes.       Lawson Martinez MD  April 21, 2019

## 2019-04-21 NOTE — PROGRESS NOTES
Spiritual Care Partner Volunteer visited patient in Rm 417 on 4/21/19. Documented by:   Chaplain Watson MDiv, MACE  [phone] PRAY ([phone])

## 2019-04-22 ENCOUNTER — PATIENT OUTREACH (OUTPATIENT)
Dept: FAMILY MEDICINE CLINIC | Age: 34
End: 2019-04-22

## 2019-04-22 NOTE — PROGRESS NOTES
.  Hospital Discharge Follow-Up      Date/Time:  2019 10:57 AM    Patient was admitted to [facility] on 19 and discharged on 19 for Vaginal Bleeding. The physician discharge summary was available at the time of outreach. Patient was contacted within 1 business days of discharge. Top Challenges reviewed with the provider   [facility] admit 19-19 for Vaginal Bleeding  · Consult with GYN-Patient to follow OP for Hemorrhagic right ovarian cyst  · Consult Nephrologist- 2-Units of PRBCs given with dialysis on 19  · Off Eliquis- Need to follow up with Hematology /PCP-Patient promised to follow up this week to ensure resolution- before re-start. Labs: Consider repeat HGB/HCT,-HGB trending down at discharge on 19 03:49  2019 04:20 2019 13:56 2019 02:45   BUN/Creatinine ratio  4 (L)  4 (L)  4 (L)   Calcium  8.4 (L)  8.4 (L)  8.5     2019 19:49  2019 04:20 2019 13:56 2019 02:45   HGB  7.8 (L)  6.5 (L) 10.0 (L) 9.0 (L)   HCT  24.5 (L)  21.7 (L) 31.9 (L) 28.9 (L)   INR=1.1 on 19 19:49   HCG, Ql.  NEGATIVE        Method of communication with provider :Chart routing, phone    Inpatient RRAT score: 21  Was this a readmission? no   Patient stated reason for the readmission: n/a    Nurse Navigator (NN) contacted the patient by telephone to perform post hospital discharge assessment. Verified name and  with patient as identifiers. Provided introduction to self, and explanation of the Nurse Navigator role. Reviewed discharge instructions and red flags with patient who verbalized understanding. Patient given an opportunity to ask questions and does not have any further questions or concerns at this time. The patient agrees to contact the PCP office for questions related to their healthcare. NN provided contact information for future reference. Patient states she is feeling much better today. Still have some bleeding.  NN offered to schedule follow-up appointments with hematologist and GYN. Patient declined, stating she has to talk with her PCP about a referral because she has not seen the Hematologist or a GYN in a while. NN scheduled appointment with PCP for 4/26/19, due to dialysis. Cesar Rivera appointment and patient declined. Says she \"will call if she needs to be seen before Friday. \"    Disease Specific:   N/A    Summary of patient's top problems:  1. Anemia- C/o dizziness and weakness due to blood loss, vaginal bleeding, HGB down to 6.8-Received 2 units of PRBC during dialysis on 4/20/19. Patient will follow up with PCP for hematology and GYN referral. Has right ovarian cyst and prolonged menses. HGB at 9.0 at discharge and trending down. PCP appointment scheduled for 4/26/19 due to dialysis schedule. 2. ESRD- had dialysis last on 4/20. Scheduled for Tuesday 4/23/19    3. History of PE- on long term Eliquis- on hold until follow up with PCP, hematologist to determine when to resume eiquis. Was put on eliquis 5 years ago and has never followed up with hematologist.    [address] orders at discharge: [address]: none  Date of initial visit: [address] [facility] ordered/company: none  Durable Medical Equipment received: none    Barriers to care? None verbalized    Advance Care Planning:   Does patient have an Advance Directive:  patient declined education, patient has stated in the pass and again states her mother will make decision for her. Decline ACP discussion. Medication(s):   New Medications at Discharge: none  Changed Medications at Discharge: Plaquenil  Discontinued Medications at Discharge:  Eliquis    Medication reconciliation was performed with patient, who verbalizes understanding of administration of home medications. There were no barriers to obtaining medications identified at this time.     Referral to Pharm D needed: no     Current Outpatient Medications   Medication Sig    amitriptyline (ELAVIL) 50 mg tablet TAKE 1 TABLET BY MOUTH EVERY NIGHT AT BEDTIME    minoxidil (LONITEN) 2.5 mg tablet     losartan (COZAAR) 100 mg tablet     hydroxychloroquine (PLAQUENIL) 200 mg tablet TAKE 2 TABLETS BY MOUTH DAILY    oxyCODONE-acetaminophen (PERCOCET) 5-325 mg per tablet Take 1 Tab by mouth two (2) times daily as needed for Pain (severe back pain) for up to 30 days. Max Daily Amount: 2 Tabs.  ondansetron (ZOFRAN ODT) 8 mg disintegrating tablet Take 1 Tab by mouth every eight (8) hours as needed for Nausea.  acetaminophen (TYLENOL EXTRA STRENGTH) 500 mg tablet Take 2 Tabs by mouth every six (6) hours as needed for Pain.  ferric citrate (AURYXIA) 210 mg iron tablet Take 210 mg by mouth three (3) times daily (with meals).  carvedilol (COREG) 25 mg tablet Take 1 Tab by mouth two (2) times daily (with meals).  amLODIPine (NORVASC) 10 mg tablet Take 1 Tab by mouth daily.  polyethylene glycol (MIRALAX) 17 gram packet Take 17 g by mouth daily as needed.  predniSONE (DELTASONE) 5 mg tablet Take 2 Tabs by mouth daily.  senna (SENNA) 8.6 mg tablet Take 1 Tab by mouth daily as needed for Constipation. for constipation    gemfibrozil (LOPID) 600 mg tablet Take 300 mg by mouth daily.  azaTHIOprine (IMURAN) 50 mg tablet Take 50 mg by mouth daily (after breakfast).  albuterol (PROVENTIL HFA, VENTOLIN HFA, PROAIR HFA) 90 mcg/actuation inhaler Take 1-2 Puffs by inhalation every four (4) hours as needed for Wheezing or Shortness of Breath.  pantoprazole (PROTONIX) 40 mg tablet Take 1 Tab by mouth Daily (before breakfast). No current facility-administered medications for this visit. There are no discontinued medications.     [facility] follow up appointment(s):   Future Appointments   Date Time Provider Rocio Hardy   4/26/2019  2:15 PM Nikolai Slade NP PA[facility]ARBY RAMANDEEP SCHED   5/6/2019  1:00 PM MD Shanna Huggins 27   8/5/2019  9:30 AM Solomon Alcantar MD [facility]      Non-[facility] follow up appointment(s): none  [facility]:  offered and patient declined       Goals      Attends follow-up appointments as directed. · Discussed importance of follow-up appointment with GYN, Hematologist and Neurologist  · Scheduled to follow up with PCP on 4/26/19  · Will obtain referrals for GYN and Hematologist at next PCP visit  · Declined  or [facility]  · NN will follow in one week.  management of anemia post hospitalization       [facility] admit 4/18/19-4/21/19 for Vaginal Bleeding  · Reviewed Red flags: you passed out or loss of consciousness  · Your have severe vaginal bleeding  · You become dizzy or lightheaded or feel you may faint  · You have worse or new belly or pelvic pain  · Take only meds ordered for pain  · Be sure to make and go to all appointments, and call your doctor if you are having problems. · It's also a good idea to know your test results and keep a list of the medicines you take. · Discussed patient's Lab goals and when to call providers  · Reviewed discharged instructions  NN will follow in one week.  pk

## 2019-04-27 ENCOUNTER — APPOINTMENT (OUTPATIENT)
Dept: CT IMAGING | Age: 34
End: 2019-04-27
Attending: EMERGENCY MEDICINE
Payer: MEDICARE

## 2019-04-27 ENCOUNTER — HOSPITAL ENCOUNTER (EMERGENCY)
Age: 34
Discharge: HOME | End: 2019-04-27
Attending: EMERGENCY MEDICINE
Payer: MEDICARE

## 2019-04-27 VITALS
SYSTOLIC BLOOD PRESSURE: 150 MMHG | BODY MASS INDEX: 29.86 KG/M2 | HEIGHT: 65 IN | HEART RATE: 96 BPM | DIASTOLIC BLOOD PRESSURE: 103 MMHG | TEMPERATURE: 98.3 F | OXYGEN SATURATION: 97 % | WEIGHT: 179.23 LBS | RESPIRATION RATE: 18 BRPM

## 2019-04-27 DIAGNOSIS — N94.89 ADNEXAL MASS: Primary | ICD-10-CM

## 2019-04-27 DIAGNOSIS — N83.201 HEMORRHAGIC CYST OF RIGHT OVARY: ICD-10-CM

## 2019-04-27 LAB
ALBUMIN SERPL-MCNC: 3.5 G/DL (ref 3.5–5)
ALBUMIN/GLOB SERPL: 0.7 {RATIO} (ref 1.1–2.2)
ALP SERPL-CCNC: 99 U/L (ref 45–117)
ALT SERPL-CCNC: 13 U/L (ref 12–78)
ANION GAP SERPL CALC-SCNC: 12 MMOL/L (ref 5–15)
AST SERPL-CCNC: 27 U/L (ref 15–37)
BASOPHILS # BLD: 0 K/UL (ref 0–0.1)
BASOPHILS NFR BLD: 1 % (ref 0–1)
BILIRUB SERPL-MCNC: 0.5 MG/DL (ref 0.2–1)
BUN SERPL-MCNC: 48 MG/DL (ref 6–20)
BUN/CREAT SERPL: 5 (ref 12–20)
CALCIUM SERPL-MCNC: 9.1 MG/DL (ref 8.5–10.1)
CHLORIDE SERPL-SCNC: 94 MMOL/L (ref 97–108)
CO2 SERPL-SCNC: 30 MMOL/L (ref 21–32)
CREAT SERPL-MCNC: 9.74 MG/DL (ref 0.55–1.02)
DIFFERENTIAL METHOD BLD: ABNORMAL
EOSINOPHIL # BLD: 0.1 K/UL (ref 0–0.4)
EOSINOPHIL NFR BLD: 2 % (ref 0–7)
ERYTHROCYTE [DISTWIDTH] IN BLOOD BY AUTOMATED COUNT: 15.1 % (ref 11.5–14.5)
GLOBULIN SER CALC-MCNC: 5.1 G/DL (ref 2–4)
GLUCOSE SERPL-MCNC: 96 MG/DL (ref 65–100)
HCG SERPL-ACNC: <1 MIU/ML (ref 0–6)
HCT VFR BLD AUTO: 31.7 % (ref 35–47)
HGB BLD-MCNC: 10.1 G/DL (ref 11.5–16)
IMM GRANULOCYTES # BLD AUTO: 0 K/UL (ref 0–0.04)
IMM GRANULOCYTES NFR BLD AUTO: 0 % (ref 0–0.5)
LACTATE SERPL-SCNC: 1.1 MMOL/L (ref 0.4–2)
LIPASE SERPL-CCNC: 134 U/L (ref 73–393)
LYMPHOCYTES # BLD: 0.8 K/UL (ref 0.8–3.5)
LYMPHOCYTES NFR BLD: 22 % (ref 12–49)
MAGNESIUM SERPL-MCNC: 2.1 MG/DL (ref 1.6–2.4)
MCH RBC QN AUTO: 29.2 PG (ref 26–34)
MCHC RBC AUTO-ENTMCNC: 31.9 G/DL (ref 30–36.5)
MCV RBC AUTO: 91.6 FL (ref 80–99)
MONOCYTES # BLD: 0.5 K/UL (ref 0–1)
MONOCYTES NFR BLD: 14 % (ref 5–13)
NEUTS SEG # BLD: 2.1 K/UL (ref 1.8–8)
NEUTS SEG NFR BLD: 61 % (ref 32–75)
NRBC # BLD: 0 K/UL (ref 0–0.01)
NRBC BLD-RTO: 0 PER 100 WBC
PHOSPHATE SERPL-MCNC: 5.9 MG/DL (ref 2.6–4.7)
PLATELET # BLD AUTO: 184 K/UL (ref 150–400)
PMV BLD AUTO: 10.4 FL (ref 8.9–12.9)
POTASSIUM SERPL-SCNC: 4.7 MMOL/L (ref 3.5–5.1)
PROT SERPL-MCNC: 8.6 G/DL (ref 6.4–8.2)
RBC # BLD AUTO: 3.46 M/UL (ref 3.8–5.2)
RBC MORPH BLD: ABNORMAL
RBC MORPH BLD: ABNORMAL
SODIUM SERPL-SCNC: 136 MMOL/L (ref 136–145)
WBC # BLD AUTO: 3.5 K/UL (ref 3.6–11)

## 2019-04-27 PROCEDURE — 84100 ASSAY OF PHOSPHORUS: CPT

## 2019-04-27 PROCEDURE — 74011636320 HC RX REV CODE- 636/320: Performed by: EMERGENCY MEDICINE

## 2019-04-27 PROCEDURE — 80053 COMPREHEN METABOLIC PANEL: CPT

## 2019-04-27 PROCEDURE — 83605 ASSAY OF LACTIC ACID: CPT

## 2019-04-27 PROCEDURE — 83735 ASSAY OF MAGNESIUM: CPT

## 2019-04-27 PROCEDURE — 99284 EMERGENCY DEPT VISIT MOD MDM: CPT

## 2019-04-27 PROCEDURE — 74011250636 HC RX REV CODE- 250/636: Performed by: EMERGENCY MEDICINE

## 2019-04-27 PROCEDURE — 84702 CHORIONIC GONADOTROPIN TEST: CPT

## 2019-04-27 PROCEDURE — 85025 COMPLETE CBC W/AUTO DIFF WBC: CPT

## 2019-04-27 PROCEDURE — 74177 CT ABD & PELVIS W/CONTRAST: CPT

## 2019-04-27 PROCEDURE — 83690 ASSAY OF LIPASE: CPT

## 2019-04-27 PROCEDURE — 96374 THER/PROPH/DIAG INJ IV PUSH: CPT

## 2019-04-27 PROCEDURE — 36415 COLL VENOUS BLD VENIPUNCTURE: CPT

## 2019-04-27 RX ORDER — SODIUM CHLORIDE 9 MG/ML
50 INJECTION, SOLUTION INTRAVENOUS
Status: COMPLETED | OUTPATIENT
Start: 2019-04-27 | End: 2019-04-27

## 2019-04-27 RX ORDER — SODIUM CHLORIDE 0.9 % (FLUSH) 0.9 %
10 SYRINGE (ML) INJECTION
Status: COMPLETED | OUTPATIENT
Start: 2019-04-27 | End: 2019-04-27

## 2019-04-27 RX ORDER — FENTANYL CITRATE 50 UG/ML
50 INJECTION, SOLUTION INTRAMUSCULAR; INTRAVENOUS
Status: COMPLETED | OUTPATIENT
Start: 2019-04-27 | End: 2019-04-27

## 2019-04-27 RX ADMIN — Medication 10 ML: at 05:12

## 2019-04-27 RX ADMIN — SODIUM CHLORIDE 50 ML/HR: 900 INJECTION, SOLUTION INTRAVENOUS at 05:12

## 2019-04-27 RX ADMIN — IOPAMIDOL 100 ML: 755 INJECTION, SOLUTION INTRAVENOUS at 05:12

## 2019-04-27 RX ADMIN — FENTANYL CITRATE 50 MCG: 50 INJECTION, SOLUTION INTRAMUSCULAR; INTRAVENOUS at 04:29

## 2019-04-27 NOTE — ED NOTES
Dialysis patient with access on right arm. Limb alert bracelet applied. Reports dialysis schedule as Hieu Meier, Sat. States that she makes very little urine, but denies any urinary or vaginal symptoms.

## 2019-04-27 NOTE — DISCHARGE INSTRUCTIONS
Hemorrhagic Ovarian Cyst: Care Instructions  Your Care Instructions    Sometimes a sac forms on the surface of a woman's ovary. When the sac swells up with fluid, it forms a cyst. If the cyst bleeds, it is called a hemorrhagic (say \"Darrius\") ovarian cyst. If the cyst breaks open, blood and fluid spill out into the lower belly and pelvis. You may not have symptoms from the cyst. But if it is large, or if it twists or breaks open, you may have pain or other problems. You may feel pain from the cyst or have symptoms from losing blood. Your doctor may use a pelvic ultrasound to see if you have a cyst. Blood tests can help your doctor tell if the cyst is bleeding or you have lost a lot of blood. Treatment depends on your symptoms. If they are mild, your doctor may suggest carefully watching your symptoms and doing blood tests again. But if you have a cyst that is very large, bleeds a lot, or causes other problems, your doctor may suggest surgery to remove it. If the bleeding is heavy, you may also need treatment to replace the blood. Follow-up care is a key part of your treatment and safety. Be sure to make and go to all appointments, and call your doctor if you are having problems. It's also a good idea to know your test results and keep a list of the medicines you take. How can you care for yourself at home? · Use heat, such as a warm water bottle, a heating pad set on low, or a warm bath, to relax tense muscles and relieve cramping. · Be safe with medicines. Read and follow all instructions on the label. ? If the doctor gave you a prescription medicine for pain, take it as prescribed. ? If you are not taking a prescription pain medicine, ask your doctor if you can take an over-the-counter medicine. When should you call for help? Call 911 anytime you think you may need emergency care.  For example, call if:    · You passed out (lost consciousness).    Call your doctor now or seek immediate medical care if:    · You have severe vaginal bleeding.     · You are dizzy or lightheaded, or you feel like you may faint.     · You have new or worse pain in your belly or pelvis.    Watch closely for changes in your health, and be sure to contact your doctor if:    · You think you may be pregnant.     · You do not get better as expected. Where can you learn more? Go to http://jorge-rochelle.info/. Enter D256 in the search box to learn more about \"Hemorrhagic Ovarian Cyst: Care Instructions. \"  Current as of: May 14, 2018  Content Version: 11.9  © 9688-2059 [employer]. Care instructions adapted under license by [employer] (which disclaims liability or warranty for this information). If you have questions about a medical condition or this instruction, always ask your healthcare professional. [address] any warranty or liability for your use of this information.

## 2019-04-27 NOTE — ED PROVIDER NOTES
'RLQ abd pain/ going into my back since 1 pm today/ BM at 2 pm and ate pizza at 3pm no change in pain/ aching in nature    pt denies HA, vison changes, diff swallowing, CP, SOB, F/Ch, N/V, D/Cons or other current systemic complaints    Pt denies driving here, 'was dropped off'; Abdominal Pain    This is a new problem. The current episode started 12 to 24 hours ago. The problem occurs constantly. The problem has not changed since onset. The pain is associated with an unknown factor. The pain is located in the RLQ. The quality of the pain is aching, dull and cramping. The pain is mild. Associated symptoms include back pain. Pertinent negatives include no anorexia, no fever, no belching, no diarrhea, no flatus, no hematochezia, no melena, no nausea, no vomiting, no constipation, no dysuria, no frequency, no hematuria, no headaches, no arthralgias, no trauma and no chest pain. Nothing worsens the pain. The pain is relieved by nothing. Past workup comments: denies prior episodes 'of pain like this'. Past medical history comments: anemia, asthma, esrd (dialysis later this am), Carditid, chronic pain, GERD, HTN, Lupus, prior peritoneal dialysis. , 'peritoneal wash-out'       Past Medical History:   Diagnosis Date    Anemia     secondary to lupus    Asthma     no inhaler use in past 2 to 3 years    Carditis     Chronic kidney disease     ESRD    Chronic pain     DDD (degenerative disc disease), lumbar     [facility]) [facility] [address]     GERD (gastroesophageal reflux disease)     Hemodialysis patient ([facility] [address] 2017    [address]  Tuesday,  Thursday,  and Saturday.     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  Poor historian 2018    With medications    Thromboembolus ([facility] [address] 2013    lungs    Transfusion history     Last Transfusion 2017  at [facility]       Past Surgical History:   Procedure Laterality Date    HX  SECTION  11/2006    x1    HX OTHER SURGICAL  9/16/15    INSERTION PD CATH;  Removed 2017    HX VASCULAR ACCESS Right 2017    Double-Lumen henry catheter upper chest    HX VASCULAR ACCESS Right 2018    right upper arm         Family History:   Problem Relation Age of Onset    Diabetes Father     Hypertension Father     Cancer Other         aunt with breast cancer    Diabetes Mother        Social History     Socioeconomic History    Marital status: SINGLE     Spouse name: Not on file    Number of children: Not on file    Years of education: Not on file    Highest education level: Not on file   Occupational History    Not on file   Social Needs    Financial resource strain: Not on file    Food insecurity:     Worry: Not on file     Inability: Not on file    Transportation needs:     Medical: Not on file     Non-medical: Not on file   Tobacco Use    Smoking status: Never Smoker    Smokeless tobacco: Never Used   Substance and Sexual Activity    Alcohol use: No    Drug use: Yes     Types: Prescription, OTC    Sexual activity: Not Currently   Lifestyle    Physical activity:     Days per week: Not on file     Minutes per session: Not on file    Stress: Not on file   Relationships    Social connections:     Talks on phone: Not on file     Gets together: Not on file     Attends [employer] service: Not on file     Active member of club or organization: Not on file     Attends meetings of clubs or organizations: Not on file     Relationship status: Not on file    Intimate partner violence:     Fear of current or ex partner: Not on file     Emotionally abused: Not on file     Physically abused: Not on file     Forced sexual activity: Not on file   Other Topics Concern     Service No    Blood Transfusions No    Caffeine Concern No    Occupational Exposure No    Hobby Hazards No    Sleep Concern No    Stress Concern No    Weight Concern No    Special Diet No    Back Care Yes     Comment: low back pain    Exercise No    Bike Helmet No    Seat Belt Yes    Self-Exams No   Social History Narrative    Lives with parents and daughter. ALLERGIES: Compazine [prochlorperazine edisylate] and Compazine [prochlorperazine]    Review of Systems   Constitutional: Negative for fever. Cardiovascular: Negative for chest pain. Gastrointestinal: Positive for abdominal pain. Negative for anorexia, constipation, diarrhea, flatus, hematochezia, melena, nausea and vomiting. Genitourinary: Negative for dysuria, frequency and hematuria. Musculoskeletal: Positive for back pain. Negative for arthralgias. Neurological: Negative for headaches. Vitals:    04/27/19 0415   BP: (!) 180/124   Pulse: 96   Resp: 18   Temp: 98.3 °F (36.8 °C)   SpO2: 96%   Weight: 81.3 kg (179 lb 3.7 oz)   Height: 5' 5\" (1.651 m)            Physical Exam   Constitutional: She is oriented to person, place, and time. She appears well-developed and well-nourished. NAD, AxOx4, speaking in complete sentences     HENT:   Head: Normocephalic and atraumatic. Nose: Nose normal.   Eyes: Pupils are equal, round, and reactive to light. Conjunctivae and EOM are normal. Right eye exhibits no discharge. Left eye exhibits no discharge. No scleral icterus. Neck: Normal range of motion. Neck supple. No JVD present. No tracheal deviation present. Cardiovascular: Normal rate, regular rhythm, normal heart sounds and intact distal pulses. Exam reveals no gallop and no friction rub. No murmur heard. Pulmonary/Chest: Effort normal and breath sounds normal. No respiratory distress. She has no wheezes. She has no rales. She exhibits no tenderness.    Abdominal: Soft. Bowel sounds are normal. She exhibits no distension and no mass. There is tenderness. There is no rebound and no guarding. No hernia. RLQ min ttp    Neg peritoneal signs   Genitourinary: No vaginal discharge found. Genitourinary Comments: Pt denies urinary/ vaginal complaints   Musculoskeletal: Normal range of motion. She exhibits no edema, tenderness or deformity. RUE - noted shunt/ good bruits; Neurological: She is alert and oriented to person, place, and time. She displays normal reflexes. No cranial nerve deficit or sensory deficit. She exhibits normal muscle tone. Coordination normal.   Skin: Skin is warm and dry. Capillary refill takes less than 2 seconds. No rash noted. No erythema. No pallor. Psychiatric: She has a normal mood and affect. Her behavior is normal. Thought content normal.   Nursing note and vitals reviewed. MDM       Procedures      5:49 AM Pt 'feels better (playing games on phone); told of adnexal mass/ ovarian cyst/ Pt agrees w/ OB/GYN follow-up/ 'will keep dialysis appt at 10 am';  Tex Ball's  results have been reviewed with her. She has been counseled regarding her diagnosis. She verbally conveys understanding and agreement of the signs, symptoms, diagnosis, treatment and prognosis and additionally agrees to Call/ Arrange follow up as recommended with Dr. Char José NP in 24 - 48 hours. She also agrees with the care-plan and conveys that all of her questions have been answered. I have also put together some discharge instructions for her that include: 1) educational information regarding their diagnosis, 2) how to care for their diagnosis at home, as well a 3) list of reasons why they would want to return to the ED prior to their follow-up appointment, should their condition change or for concerns.

## 2019-05-06 ENCOUNTER — OFFICE VISIT (OUTPATIENT)
Dept: NEUROLOGY | Age: 34
End: 2019-05-06

## 2019-05-06 VITALS
SYSTOLIC BLOOD PRESSURE: 122 MMHG | BODY MASS INDEX: 29.82 KG/M2 | DIASTOLIC BLOOD PRESSURE: 84 MMHG | RESPIRATION RATE: 20 BRPM | WEIGHT: 179 LBS | HEIGHT: 65 IN

## 2019-05-06 DIAGNOSIS — G89.29 CHRONIC BILATERAL LOW BACK PAIN WITHOUT SCIATICA: Primary | ICD-10-CM

## 2019-05-06 DIAGNOSIS — M54.50 CHRONIC BILATERAL LOW BACK PAIN WITHOUT SCIATICA: Primary | ICD-10-CM

## 2019-05-06 RX ORDER — OXYCODONE AND ACETAMINOPHEN 5; 325 MG/1; MG/1
1 TABLET ORAL
Qty: 40 TAB | Refills: 0 | Status: SHIPPED | OUTPATIENT
Start: 2019-05-06 | End: 2019-05-29 | Stop reason: SDUPTHER

## 2019-05-06 NOTE — PROGRESS NOTES
Interval HPI:     This is a 36 y.o. female who is following up for     PMHx: chronic back pain, hx of fibromyalgia, hx of DVT/ coumadin, hx of Lupus, mild lower lumbar disc degeneration (L4-5 and L5-S1 with small annular tear at L5-S1)    Chief Complaint   Patient presents with    Pain (Chronic)     Follow Up        No significant change in moderate lower back pain, but has had new left lower abdominal pain that she found out was due to ovarian cyst, and has had increased menstrual bleeding requiring an admission. She reports her pain has been increased due to the cyst-related pain, not responsive to [address]. Going to see Ob-Gyn this week. Continues taking Percocet 5/ 325 one to two tabs per day. Have been gradually reducing # of hydrocodone tablets, last visit given Rx for #45 tablets. Tried/ failed: Gabapentin, Cymbalta, Amitriptyline (only helped get to sleep), Lyrica (abnormal behaviors), Hydrocodone (\"too strong\")    Reviewed : no abberant behaviors  Last filled on 4-3-19 (#45 tabs)     UDS Hx:  March 2017: consistent with Rx pain medication  9-13-17 UDS: only screened for codeine or morphine, which pt doesn't take  1-3-18: serum Drug screen: consistent with Rx pain medication (oxycodone)  11-19-18: serum drug screen; consistent with Rx pain medication (oxycodone)      Brief ROS: as above      Past Medical History:   Diagnosis Date    Anemia     secondary to lupus    Asthma     no inhaler use in past 2 to 3 years    Carditis     Chronic kidney disease     ESRD    Chronic pain     DDD (degenerative disc disease), lumbar     [facility]) [facility] [facility]     GERD (gastroesophageal reflux disease)     Hemodialysis patient ([facility] [address] 12/21/2017    [address]  Tuesday,  Thursday,  and Saturday.     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  Poor historian 2018    With medications    Thromboembolus ([facility] [address] 2013    lungs    Transfusion history     Last Transfusion 2017  at [facility]       Past Surgical History:   Procedure Laterality Date    HX  SECTION  11/2006    x1    HX OTHER SURGICAL  9/16/15    INSERTION PD CATH;  Removed 2017    HX VASCULAR ACCESS Right 2017    Double-Lumen henry catheter upper chest    HX VASCULAR ACCESS Right 2018    right upper arm       Family History   Problem Relation Age of Onset    Diabetes Father     Hypertension Father     Cancer Other         aunt with breast cancer    Diabetes Mother        Social History     Socioeconomic History    Marital status: SINGLE     Spouse name: Not on file    Number of children: Not on file    Years of education: Not on file    Highest education level: Not on file   Occupational History    Not on file   Social Needs    Financial resource strain: Not on file    Food insecurity:     Worry: Not on file     Inability: Not on file    Transportation needs:     Medical: Not on file     Non-medical: Not on file   Tobacco Use    Smoking status: Never Smoker    Smokeless tobacco: Never Used   Substance and Sexual Activity    Alcohol use: No    Drug use: Yes     Types: Prescription, OTC    Sexual activity: Not Currently   Lifestyle    Physical activity:     Days per week: Not on file     Minutes per session: Not on file    Stress: Not on file   Relationships    Social connections:     Talks on phone: Not on file     Gets together: Not on file     Attends [employer] service: Not on file     Active member of club or organization: Not on file     Attends meetings of clubs or organizations: Not on file     Relationship status: Not on file    Intimate partner violence:     Fear of current or ex partner: Not on file     Emotionally abused: Not on file     Physically abused: Not on file     Forced sexual activity: Not on file   Other Topics Concern     Service No    Blood Transfusions No    Caffeine Concern No    Occupational Exposure No    Hobby Hazards No    Sleep Concern No    Stress Concern No    Weight Concern No    Special Diet No    Back Care Yes     Comment: low back pain    Exercise No    Bike Helmet No    Seat Belt Yes    Self-Exams No   Social History Narrative    Lives with parents and daughter. Allergies   Allergen Reactions    Compazine [Prochlorperazine Edisylate] Nausea and Vomiting and Palpitations    Compazine [Prochlorperazine] Other (comments)     Hot and lightheaded. Current Outpatient Medications   Medication Sig Dispense Refill    oxyCODONE-acetaminophen (PERCOCET) 5-325 mg per tablet Take 1 Tab by mouth two (2) times daily as needed for Pain (severe back pain) for up to 30 days. Max Daily Amount: 2 Tabs. 40 Tab 0    amitriptyline (ELAVIL) 50 mg tablet TAKE 1 TABLET BY MOUTH EVERY NIGHT AT BEDTIME 90 Tab 4    minoxidil (LONITEN) 2.5 mg tablet       losartan (COZAAR) 100 mg tablet       hydroxychloroquine (PLAQUENIL) 200 mg tablet TAKE 2 TABLETS BY MOUTH DAILY 180 Tab 6    ondansetron (ZOFRAN ODT) 8 mg disintegrating tablet Take 1 Tab by mouth every eight (8) hours as needed for Nausea. 15 Tab 0    acetaminophen (TYLENOL EXTRA STRENGTH) 500 mg tablet Take 2 Tabs by mouth every six (6) hours as needed for Pain. 30 Tab 0    ferric citrate (AURYXIA) 210 mg iron tablet Take 210 mg by mouth three (3) times daily (with meals).  carvedilol (COREG) 25 mg tablet Take 1 Tab by mouth two (2) times daily (with meals). 60 Tab 0    amLODIPine (NORVASC) 10 mg tablet Take 1 Tab by mouth daily. 30 Tab 0    polyethylene glycol (MIRALAX) 17 gram packet Take 17 g by mouth daily as needed.       predniSONE (DELTASONE) 5 mg tablet Take 2 Tabs by mouth daily. 30 Tab 0    senna (SENNA) 8.6 mg tablet Take 1 Tab by mouth daily as needed for Constipation. for constipation      gemfibrozil (LOPID) 600 mg tablet Take 300 mg by mouth daily.  azaTHIOprine (IMURAN) 50 mg tablet Take 50 mg by mouth daily (after breakfast).  albuterol (PROVENTIL HFA, VENTOLIN HFA, PROAIR HFA) 90 mcg/actuation inhaler Take 1-2 Puffs by inhalation every four (4) hours as needed for Wheezing or Shortness of Breath. 1 Inhaler 2    pantoprazole (PROTONIX) 40 mg tablet Take 1 Tab by mouth Daily (before breakfast). 30 Tab 0       Physical Exam  Vitals:    05/06/19 1234   BP: 122/84   BP 1 Location: Left arm   BP Patient Position: Sitting   Pulse: Comment: unable to obtain due to artifical nails   Resp: 20   SpO2: Comment: unable to obtain due to artificial nails. Weight: 81.2 kg (179 lb)   Height: 5' 5\" (1.651 m)       Awake, alert, conversant  Neck: supple  Ext: dialysis fistula in right forearm    MSK:  Lumbar spine:  + tender to palpation across lower lumbar paraspinal muscles  Mild pain with back extension > flexion       Focused Neurological Exam     Mental status:   Alert and oriented to person, place situation  Mood appears stable  Normal thought processes    CNs: EOMI, Face symmetric, Hearing/ Language normal  Sensory: intact light touch  Motor: 4/ 5 strength in arms and legs    Reflexes: not examined today  Gait: normal    Impression    ICD-10-CM ICD-9-CM    1. Chronic bilateral low back pain without sciatica M54.5 724.2 oxyCODONE-acetaminophen (PERCOCET) 5-325 mg per tablet    G89.29 338.29         1) Chronic bilateral lower back pain without sciatica    Continue Percocet 5/ 325 one tab up to two times per day for severe back pain. Reduced # of Percocet tabs to #40 for this month. D/w patient that at next visit will start reducing the pain medication faster (down by 10 tabs/ month) to speed up the taper off process.   Encouraged her to start doing home stretching for chronic low back pain. Follow up in 4 weeks.           Signed By: Rsoie Ugalde MD     May 6, 2019

## 2019-05-06 NOTE — PROGRESS NOTES
Patient is here for a pain mgmt follow up,    She states that she has been feeling okay, not the best. She was in the hospital 2 weeks ago for 4 days due to loss of blood. No other concerns at this time.

## 2019-05-10 ENCOUNTER — PATIENT OUTREACH (OUTPATIENT)
Dept: FAMILY MEDICINE CLINIC | Age: 34
End: 2019-05-10

## 2019-05-10 NOTE — PROGRESS NOTES
SURJIT Follow-up assessment:  Outbound call made to patient today to complete SURJIT follow up assessment. Patient verified by 3 identifiers. Patient had follow up with Neurology on 5/6/19 for pain, was told to continue   taking Percocet 5/ 325 one to two tabs per day. Follow-up appointments reviewed, and medication reconciliation completed. NN contact information given for questions and concerns. Current Outpatient Medications:     oxyCODONE-acetaminophen (PERCOCET) 5-325 mg per tablet, Take 1 Tab by mouth two (2) times daily as needed for Pain (severe back pain) for up to 30 days. Max Daily Amount: 2 Tabs., Disp: 40 Tab, Rfl: 0    amitriptyline (ELAVIL) 50 mg tablet, TAKE 1 TABLET BY MOUTH EVERY NIGHT AT BEDTIME, Disp: 90 Tab, Rfl: 4    minoxidil (LONITEN) 2.5 mg tablet, , Disp: , Rfl:     losartan (COZAAR) 100 mg tablet, , Disp: , Rfl:     hydroxychloroquine (PLAQUENIL) 200 mg tablet, TAKE 2 TABLETS BY MOUTH DAILY, Disp: 180 Tab, Rfl: 6    ondansetron (ZOFRAN ODT) 8 mg disintegrating tablet, Take 1 Tab by mouth every eight (8) hours as needed for Nausea., Disp: 15 Tab, Rfl: 0    acetaminophen (TYLENOL EXTRA STRENGTH) 500 mg tablet, Take 2 Tabs by mouth every six (6) hours as needed for Pain., Disp: 30 Tab, Rfl: 0    ferric citrate (AURYXIA) 210 mg iron tablet, Take 210 mg by mouth three (3) times daily (with meals). , Disp: , Rfl:     carvedilol (COREG) 25 mg tablet, Take 1 Tab by mouth two (2) times daily (with meals). , Disp: 60 Tab, Rfl: 0    amLODIPine (NORVASC) 10 mg tablet, Take 1 Tab by mouth daily. , Disp: 30 Tab, Rfl: 0    polyethylene glycol (MIRALAX) 17 gram packet, Take 17 g by mouth daily as needed. , Disp: , Rfl:     predniSONE (DELTASONE) 5 mg tablet, Take 2 Tabs by mouth daily. , Disp: 30 Tab, Rfl: 0    senna (SENNA) 8.6 mg tablet, Take 1 Tab by mouth daily as needed for Constipation. for constipation, Disp: , Rfl:     gemfibrozil (LOPID) 600 mg tablet, Take 300 mg by mouth daily. , Disp: , Rfl:     azaTHIOprine (IMURAN) 50 mg tablet, Take 50 mg by mouth daily (after breakfast). , Disp: , Rfl:     albuterol (PROVENTIL HFA, VENTOLIN HFA, PROAIR HFA) 90 mcg/actuation inhaler, Take 1-2 Puffs by inhalation every four (4) hours as needed for Wheezing or Shortness of Breath., Disp: 1 Inhaler, Rfl: 2    pantoprazole (PROTONIX) 40 mg tablet, Take 1 Tab by mouth Daily (before breakfast). , Disp: 30 Tab, Rfl: 0     Goals Addressed                 This Visit's Progress     Attends follow-up appointments as directed. · Discussed importance of follow-up appointment with GYN, Hematologist and Neurologist  · Scheduled to follow up with PCP on 4/26/19  · Will obtain referrals for GYN and Hematologist at next PCP visit  · Declined  or [facility]  · NN will follow in one week. 05/10/19  · Follow up with neurology completed. · Patient will follow up with OB/GYN this week. · Continued chronic back pain  Neurology:   · Continues taking Percocet 5/ 325 one to two tabs per day. · Have been gradually reducing # of hydrocodone tablets, last visit given Rx for #45 tablets. NN will follow in one week. pk         management of anemia post hospitalization         [facility] admit 4/18/19-4/21/19 for Vaginal Bleeding  · Reviewed Red flags: you passed out or loss of consciousness  · Your have severe vaginal bleeding  · You become dizzy or lightheaded or feel you may faint  · You have worse or new belly or pelvic pain  · Take only meds ordered for pain  · Be sure to make and go to all appointments, and call your doctor if you are having problems. · It's also a good idea to know your test results and keep a list of the medicines you take. · Discussed patient's Lab goals and when to call providers  · Reviewed discharged instructions  NN will follow in one week. pk            Case management Plan:  NN will continue to attempt contacts with patient by telephone or during office visit within next 7-10 days. Will continue to follow as necessary for the remaining 30 days post visit and will reassess for needs before discharge

## 2019-05-15 ENCOUNTER — HOSPITAL ENCOUNTER (EMERGENCY)
Age: 34
Discharge: HOME | End: 2019-05-15
Attending: EMERGENCY MEDICINE
Payer: MEDICARE

## 2019-05-15 ENCOUNTER — APPOINTMENT (OUTPATIENT)
Dept: CT IMAGING | Age: 34
End: 2019-05-15
Attending: EMERGENCY MEDICINE
Payer: MEDICARE

## 2019-05-15 VITALS — TEMPERATURE: 98.6 F | DIASTOLIC BLOOD PRESSURE: 120 MMHG | OXYGEN SATURATION: 97 % | SYSTOLIC BLOOD PRESSURE: 161 MMHG

## 2019-05-15 DIAGNOSIS — N93.9 VAGINAL BLEEDING: ICD-10-CM

## 2019-05-15 DIAGNOSIS — D64.9 ANEMIA, UNSPECIFIED TYPE: ICD-10-CM

## 2019-05-15 DIAGNOSIS — R10.13 ABDOMINAL PAIN, EPIGASTRIC: Primary | ICD-10-CM

## 2019-05-15 PROCEDURE — 83690 ASSAY OF LIPASE: CPT

## 2019-05-15 PROCEDURE — 99282 EMERGENCY DEPT VISIT SF MDM: CPT

## 2019-05-15 PROCEDURE — 85610 PROTHROMBIN TIME: CPT

## 2019-05-15 PROCEDURE — 36415 COLL VENOUS BLD VENIPUNCTURE: CPT

## 2019-05-15 PROCEDURE — 85730 THROMBOPLASTIN TIME PARTIAL: CPT

## 2019-05-15 PROCEDURE — 85025 COMPLETE CBC W/AUTO DIFF WBC: CPT

## 2019-05-15 PROCEDURE — 74011250636 HC RX REV CODE- 250/636

## 2019-05-15 PROCEDURE — 74011250637 HC RX REV CODE- 250/637: Performed by: EMERGENCY MEDICINE

## 2019-05-15 PROCEDURE — 74011250636 HC RX REV CODE- 250/636: Performed by: EMERGENCY MEDICINE

## 2019-05-15 PROCEDURE — 74176 CT ABD & PELVIS W/O CONTRAST: CPT

## 2019-05-15 PROCEDURE — 80053 COMPREHEN METABOLIC PANEL: CPT

## 2019-05-15 PROCEDURE — 74011000250 HC RX REV CODE- 250: Performed by: EMERGENCY MEDICINE

## 2019-05-15 PROCEDURE — 96375 TX/PRO/DX INJ NEW DRUG ADDON: CPT

## 2019-05-15 PROCEDURE — 82150 ASSAY OF AMYLASE: CPT

## 2019-05-15 PROCEDURE — 74011250637 HC RX REV CODE- 250/637

## 2019-05-15 PROCEDURE — 96374 THER/PROPH/DIAG INJ IV PUSH: CPT

## 2019-05-15 RX ORDER — ONDANSETRON 2 MG/ML
4 INJECTION INTRAMUSCULAR; INTRAVENOUS
Status: COMPLETED | OUTPATIENT
Start: 2019-05-15 | End: 2019-05-15

## 2019-05-15 RX ORDER — MORPHINE SULFATE 4 MG/ML
4 INJECTION INTRAVENOUS ONCE
Status: COMPLETED | OUTPATIENT
Start: 2019-05-15 | End: 2019-05-15

## 2019-05-15 RX ORDER — MORPHINE SULFATE 4 MG/ML
INJECTION INTRAVENOUS
Status: DISCONTINUED
Start: 2019-05-15 | End: 2019-05-15 | Stop reason: HOSPADM

## 2019-05-15 RX ORDER — ONDANSETRON 2 MG/ML
INJECTION INTRAMUSCULAR; INTRAVENOUS
Status: DISCONTINUED
Start: 2019-05-15 | End: 2019-05-15 | Stop reason: HOSPADM

## 2019-05-15 RX ADMIN — ALUMINUM HYDROXIDE AND MAGNESIUM HYDROXIDE: 200; 200 SUSPENSION ORAL at 03:00

## 2019-05-15 RX ADMIN — ONDANSETRON HYDROCHLORIDE 4 MG: 2 INJECTION, SOLUTION INTRAMUSCULAR; INTRAVENOUS at 03:00

## 2019-05-15 RX ADMIN — MORPHINE SULFATE 4 MG: 4 INJECTION INTRAVENOUS at 03:32

## 2019-05-15 RX ADMIN — LIDOCAINE HYDROCHLORIDE 40 ML: 20 SOLUTION ORAL; TOPICAL at 03:00

## 2019-05-15 NOTE — DISCHARGE INSTRUCTIONS
Patient Education        Abdominal Pain: Care Instructions  Your Care Instructions    Abdominal pain has many possible causes. Some aren't serious and get better on their own in a few days. Others need more testing and treatment. If your pain continues or gets worse, you need to be rechecked and may need more tests to find out what is wrong. You may need surgery to correct the problem. Don't ignore new symptoms, such as fever, nausea and vomiting, urination problems, pain that gets worse, and dizziness. These may be signs of a more serious problem. Your doctor may have recommended a follow-up visit in the next 8 to 12 hours. If you are not getting better, you may need more tests or treatment. The doctor has checked you carefully, but problems can develop later. If you notice any problems or new symptoms, get medical treatment right away. Follow-up care is a key part of your treatment and safety. Be sure to make and go to all appointments, and call your doctor if you are having problems. It's also a good idea to know your test results and keep a list of the medicines you take. How can you care for yourself at home? · Rest until you feel better. · To prevent dehydration, drink plenty of fluids, enough so that your urine is light yellow or clear like water. Choose water and other caffeine-free clear liquids until you feel better. If you have kidney, heart, or liver disease and have to limit fluids, talk with your doctor before you increase the amount of fluids you drink. · If your stomach is upset, eat mild foods, such as rice, dry toast or crackers, bananas, and applesauce. Try eating several small meals instead of two or three large ones. · Wait until 48 hours after all symptoms have gone away before you have spicy foods, alcohol, and drinks that contain caffeine. · Do not eat foods that are high in fat. · Avoid anti-inflammatory medicines such as aspirin, ibuprofen (Advil, Motrin), and naproxen (Aleve). These can cause stomach upset. Talk to your doctor if you take daily aspirin for another health problem. When should you call for help? Call 911 anytime you think you may need emergency care. For example, call if:    · You passed out (lost consciousness).     · You pass maroon or very bloody stools.     · You vomit blood or what looks like coffee grounds.     · You have new, severe belly pain.    Call your doctor now or seek immediate medical care if:    · Your pain gets worse, especially if it becomes focused in one area of your belly.     · You have a new or higher fever.     · Your stools are black and look like tar, or they have streaks of blood.     · You have unexpected vaginal bleeding.     · You have symptoms of a urinary tract infection. These may include:  ? Pain when you urinate. ? Urinating more often than usual.  ? Blood in your urine.     · You are dizzy or lightheaded, or you feel like you may faint.    Watch closely for changes in your health, and be sure to contact your doctor if:    · You are not getting better after 1 day (24 hours). Where can you learn more? Go to http://jorgeSteek SArochelle.info/. Enter V000 in the search box to learn more about \"Abdominal Pain: Care Instructions. \"  Current as of: September 23, 2018  Content Version: 11.9  © 3277-4500 [employer]. Care instructions adapted under license by [employer] (which disclaims liability or warranty for this information). If you have questions about a medical condition or this instruction, always ask your healthcare professional. [place] [address] any warranty or liability for your use of this information. Patient Education        Abnormal Uterine Bleeding: Care Instructions  Your Care Instructions    Abnormal uterine bleeding (AUB) is irregular bleeding from the uterus that is longer or heavier than usual or does not occur at your regular time.  Sometimes it is caused by changes in hormone levels. It can also be caused by growths in the uterus, such as fibroids or polyps. Sometimes a cause cannot be found. You may have heavy bleeding when you are not expecting your period. Your doctor may suggest a pregnancy test, if you think you are pregnant. Follow-up care is a key part of your treatment and safety. Be sure to make and go to all appointments, and call your doctor if you are having problems. It's also a good idea to know your test results and keep a list of the medicines you take. How can you care for yourself at home? · Be safe with medicines. Take pain medicines exactly as directed. ? If the doctor gave you a prescription medicine for pain, take it as prescribed. ? If you are not taking a prescription pain medicine, ask your doctor if you can take an over-the-counter medicine. · You may be low in iron because of blood loss. Eat a balanced diet that is high in iron and vitamin C. Foods rich in iron include red meat, shellfish, eggs, beans, and leafy green vegetables. Talk to your doctor about whether you need to take iron pills or a multivitamin. When should you call for help? Call 911 anytime you think you may need emergency care. For example, call if:    · You passed out (lost consciousness).    Call your doctor now or seek immediate medical care if:    · You have new or worse belly or pelvic pain.     · You have severe vaginal bleeding.     · You feel dizzy or lightheaded, or you feel like you may faint.    Watch closely for changes in your health, and be sure to contact your doctor if:    · You think you may be pregnant.     · Your bleeding gets worse.     · You do not get better as expected. Where can you learn more? Go to http://jorge-rochelle.info/. Enter S282 in the search box to learn more about \"Abnormal Uterine Bleeding: Care Instructions. \"  Current as of: May 14, 2018  Content Version: 11.9  © 1327-3343 [employer].  Care instructions adapted under license by [employer] (which disclaims liability or warranty for this information). If you have questions about a medical condition or this instruction, always ask your healthcare professional. [address] any warranty or liability for your use of this information. Patient Education        Anemia: Care Instructions  Your Care Instructions    Anemia is a low level of red blood cells, which carry oxygen throughout your body. Many things can cause anemia. Lack of iron is one of the most common causes. Your body needs iron to make hemoglobin, a substance in red blood cells that carries oxygen from the lungs to your body's cells. Without enough iron, the body produces fewer and smaller red blood cells. As a result, your body's cells do not get enough oxygen, and you feel tired and weak. And you may have trouble concentrating. Bleeding is the most common cause of a lack of iron. You may have heavy menstrual bleeding or bleeding caused by conditions such as ulcers, hemorrhoids, or cancer. Regular use of aspirin or other anti-inflammatory medicines (such as ibuprofen) also can cause bleeding in some people. A lack of iron in your diet also can cause anemia, especially at times when the body needs more iron, such as during pregnancy, infancy, and the teen years. Your doctor may have prescribed iron pills. It may take several months of treatment for your iron levels to return to normal. Your doctor also may suggest that you eat foods that are rich in iron, such as meat and beans. There are many other causes of anemia. It is not always due to a lack of iron. Finding the specific cause of your anemia will help your doctor find the right treatment for you. Follow-up care is a key part of your treatment and safety. Be sure to make and go to all appointments, and call your doctor if you are having problems.  It's also a good idea to know your test results and keep a list of the medicines you take. How can you care for yourself at home? · Take your medicines exactly as prescribed. Call your doctor if you think you are having a problem with your medicine. · If your doctor recommends iron pills, take them as directed:  ? Try to take the pills on an empty stomach about 1 hour before or 2 hours after meals. But you may need to take iron with food to avoid an upset stomach. ? Do not take antacids or drink milk or caffeine drinks (such as coffee, tea, or cola) at the same time or within 2 hours of the time that you take your iron. They can make it hard for your body to absorb the iron. ? Vitamin C (from food or supplements) helps your body absorb iron. Try taking iron pills with a glass of orange juice or some other food that is high in vitamin C, such as citrus fruits. ? Iron pills may cause stomach problems, such as heartburn, nausea, diarrhea, constipation, and cramps. Be sure to drink plenty of fluids, and include fruits, vegetables, and fiber in your diet each day. Iron pills often make your bowel movements dark or green. ? If you forget to take an iron pill, do not take a double dose of iron the next time you take a pill. ? Keep iron pills out of the reach of small children. An overdose of iron can be very dangerous. · Follow your doctor's advice about eating iron-rich foods. These include red meat, shellfish, poultry, eggs, beans, raisins, whole-grain bread, and leafy green vegetables. · Steam vegetables to help them keep their iron content. When should you call for help? Call 911 anytime you think you may need emergency care. For example, call if:    · You have symptoms of a heart attack. These may include:  ? Chest pain or pressure, or a strange feeling in the chest.  ? Sweating. ? Shortness of breath. ? Nausea or vomiting.   ? Pain, pressure, or a strange feeling in the back, neck, jaw, or upper belly or in one or both shoulders or arms.  ? Lightheadedness or sudden weakness. ? A fast or irregular heartbeat. After you call 911, the  may tell you to chew 1 adult-strength or 2 to 4 low-dose aspirin. Wait for an ambulance. Do not try to drive yourself.     · You passed out (lost consciousness).    Call your doctor now or seek immediate medical care if:    · You have new or increased shortness of breath.     · You are dizzy or lightheaded, or you feel like you may faint.     · Your fatigue and weakness continue or get worse.     · You have any abnormal bleeding, such as:  ? Nosebleeds. ? Vaginal bleeding that is different (heavier, more frequent, at a different time of the month) than what you are used to.  ? Bloody or black stools, or rectal bleeding. ? Bloody or pink urine.    Watch closely for changes in your health, and be sure to contact your doctor if:    · You do not get better as expected. Where can you learn more? Go to http://jorge-rochelle.info/. Enter R301 in the search box to learn more about \"Anemia: Care Instructions. \"  Current as of: May 6, 2018  Content Version: 11.9  © 1637-8861 [employer], [employer] Care instructions adapted under license by [employer] (which disclaims liability or warranty for this information). If you have questions about a medical condition or this instruction, always ask your healthcare professional. [place] [address] any warranty or liability for your use of this information.

## 2019-05-15 NOTE — ED NOTES
Initial H+P on downtime form. Computer back up at end of ER stay. Records showed 4/19 vaginal bleeding causing anemia and required blood transfusion. Pt reports continued bleeding. Has appt with OBGyn today at 10am, Dr. Gomez Camejo. Denies SOB, lightheadedness, syncope, HD stable. Today 7.8 hgb (down from 10). Other labs unremarkable. CT shows decreased size of adrenal cyst. Discussed results with patient. Reports pain slightly improved and feeling hungry and thirsty. Drinking water w/o diffciulty. Well appearing, nad, hd stable, abd soft/nt/nd. Requesting popsicle. Advised fu with Gyn, Dr. Gomez Camejo, today as planned to discuss continued vag bleeding and anemia. Pt expresses understanding.

## 2019-05-15 NOTE — ED NOTES
I have reviewed discharge instructions with the patient. The patient verbalized understanding. Pt ambulatory vehicle with family. Pt's family driving pt. Home. nad.

## 2019-05-15 NOTE — ED NOTES
The documentation for this period is being entered following the guidelines as defined in the [facility] policy by Merna Dave RN.

## 2019-05-16 LAB
ALBUMIN SERPL-MCNC: 2.9 G/DL (ref 3.5–5)
ALBUMIN/GLOB SERPL: 0.6 {RATIO} (ref 1.1–2.2)
ALP SERPL-CCNC: 126 U/L (ref 45–117)
ALT SERPL-CCNC: 15 U/L (ref 12–78)
AMYLASE SERPL-CCNC: 405 U/L (ref 25–115)
ANION GAP SERPL CALC-SCNC: 7 MMOL/L (ref 5–15)
APTT PPP: 28.9 SEC (ref 22.1–32)
AST SERPL-CCNC: 47 U/L (ref 15–37)
BASOPHILS # BLD: 0 K/UL (ref 0–0.1)
BASOPHILS NFR BLD: 0 % (ref 0–1)
BILIRUB SERPL-MCNC: 0.3 MG/DL (ref 0.2–1)
BUN SERPL-MCNC: 42 MG/DL (ref 6–20)
BUN/CREAT SERPL: 6 (ref 12–20)
CALCIUM SERPL-MCNC: 7.3 MG/DL (ref 8.5–10.1)
CHLORIDE SERPL-SCNC: 94 MMOL/L (ref 97–108)
CO2 SERPL-SCNC: 33 MMOL/L (ref 21–32)
CREAT SERPL-MCNC: 6.73 MG/DL (ref 0.55–1.02)
DIFFERENTIAL METHOD BLD: ABNORMAL
EOSINOPHIL # BLD: 0.1 K/UL (ref 0–0.4)
EOSINOPHIL NFR BLD: 2 % (ref 0–7)
ERYTHROCYTE [DISTWIDTH] IN BLOOD BY AUTOMATED COUNT: 14.6 % (ref 11.5–14.5)
GLOBULIN SER CALC-MCNC: 5.1 G/DL (ref 2–4)
GLUCOSE SERPL-MCNC: 93 MG/DL (ref 65–100)
HCT VFR BLD AUTO: 25.2 % (ref 35–47)
HGB BLD-MCNC: 7.8 G/DL (ref 11.5–16)
IMM GRANULOCYTES # BLD AUTO: 0 K/UL (ref 0–0.04)
IMM GRANULOCYTES NFR BLD AUTO: 1 % (ref 0–0.5)
INR PPP: 1.1 (ref 0.9–1.1)
LIPASE SERPL-CCNC: 117 U/L (ref 73–393)
LYMPHOCYTES # BLD: 0.6 K/UL (ref 0.8–3.5)
LYMPHOCYTES NFR BLD: 19 % (ref 12–49)
MCH RBC QN AUTO: 29 PG (ref 26–34)
MCHC RBC AUTO-ENTMCNC: 31 G/DL (ref 30–36.5)
MCV RBC AUTO: 93.7 FL (ref 80–99)
MONOCYTES # BLD: 0.3 K/UL (ref 0–1)
MONOCYTES NFR BLD: 8 % (ref 5–13)
NEUTS SEG # BLD: 2.3 K/UL (ref 1.8–8)
NEUTS SEG NFR BLD: 70 % (ref 32–75)
NRBC # BLD: 0 K/UL (ref 0–0.01)
NRBC BLD-RTO: 0 PER 100 WBC
PLATELET # BLD AUTO: 181 K/UL (ref 150–400)
PLATELET COMMENTS,PCOM: ABNORMAL
PMV BLD AUTO: 10.9 FL (ref 8.9–12.9)
POTASSIUM SERPL-SCNC: 5.2 MMOL/L (ref 3.5–5.1)
PROT SERPL-MCNC: 8 G/DL (ref 6.4–8.2)
PROTHROMBIN TIME: 10.7 SEC (ref 9–11.1)
RBC # BLD AUTO: 2.69 M/UL (ref 3.8–5.2)
RBC MORPH BLD: ABNORMAL
SODIUM SERPL-SCNC: 134 MMOL/L (ref 136–145)
THERAPEUTIC RANGE,PTTT: NORMAL SECS (ref 58–77)
WBC # BLD AUTO: 3.3 K/UL (ref 3.6–11)

## 2019-05-17 ENCOUNTER — PATIENT OUTREACH (OUTPATIENT)
Dept: FAMILY MEDICINE CLINIC | Age: 34
End: 2019-05-17

## 2019-05-18 ENCOUNTER — HOSPITAL ENCOUNTER (EMERGENCY)
Age: 34
Discharge: HOME | End: 2019-05-18
Attending: EMERGENCY MEDICINE
Payer: MEDICARE

## 2019-05-18 ENCOUNTER — APPOINTMENT (OUTPATIENT)
Dept: ULTRASOUND IMAGING | Age: 34
End: 2019-05-18
Attending: EMERGENCY MEDICINE
Payer: MEDICARE

## 2019-05-18 VITALS
BODY MASS INDEX: 31.92 KG/M2 | HEART RATE: 92 BPM | RESPIRATION RATE: 16 BRPM | DIASTOLIC BLOOD PRESSURE: 97 MMHG | HEIGHT: 65 IN | TEMPERATURE: 99 F | SYSTOLIC BLOOD PRESSURE: 135 MMHG | WEIGHT: 191.58 LBS | OXYGEN SATURATION: 97 %

## 2019-05-18 DIAGNOSIS — N94.89 ADNEXAL MASS: ICD-10-CM

## 2019-05-18 DIAGNOSIS — K59.04 FUNCTIONAL CONSTIPATION: ICD-10-CM

## 2019-05-18 DIAGNOSIS — R10.9 ABDOMINAL CRAMPING: Primary | ICD-10-CM

## 2019-05-18 LAB
ALBUMIN SERPL-MCNC: 2.9 G/DL (ref 3.5–5)
ALBUMIN/GLOB SERPL: 0.6 {RATIO} (ref 1.1–2.2)
ALP SERPL-CCNC: 118 U/L (ref 45–117)
ALT SERPL-CCNC: 17 U/L (ref 12–78)
ANION GAP SERPL CALC-SCNC: 10 MMOL/L (ref 5–15)
AST SERPL-CCNC: 32 U/L (ref 15–37)
BASOPHILS # BLD: 0 K/UL (ref 0–0.1)
BASOPHILS NFR BLD: 0 % (ref 0–1)
BILIRUB SERPL-MCNC: 0.3 MG/DL (ref 0.2–1)
BUN SERPL-MCNC: 47 MG/DL (ref 6–20)
BUN/CREAT SERPL: 5 (ref 12–20)
CALCIUM SERPL-MCNC: 7.6 MG/DL (ref 8.5–10.1)
CHLORIDE SERPL-SCNC: 97 MMOL/L (ref 97–108)
CO2 SERPL-SCNC: 30 MMOL/L (ref 21–32)
COMMENT, HOLDF: NORMAL
CREAT SERPL-MCNC: 8.62 MG/DL (ref 0.55–1.02)
DIFFERENTIAL METHOD BLD: ABNORMAL
EOSINOPHIL # BLD: 0.1 K/UL (ref 0–0.4)
EOSINOPHIL NFR BLD: 2 % (ref 0–7)
ERYTHROCYTE [DISTWIDTH] IN BLOOD BY AUTOMATED COUNT: 14.1 % (ref 11.5–14.5)
GLOBULIN SER CALC-MCNC: 5 G/DL (ref 2–4)
GLUCOSE SERPL-MCNC: 104 MG/DL (ref 65–100)
HCT VFR BLD AUTO: 24.4 % (ref 35–47)
HGB BLD-MCNC: 7.7 G/DL (ref 11.5–16)
IMM GRANULOCYTES # BLD AUTO: 0 K/UL (ref 0–0.04)
IMM GRANULOCYTES NFR BLD AUTO: 1 % (ref 0–0.5)
LIPASE SERPL-CCNC: 99 U/L (ref 73–393)
LYMPHOCYTES # BLD: 0.5 K/UL (ref 0.8–3.5)
LYMPHOCYTES NFR BLD: 13 % (ref 12–49)
MCH RBC QN AUTO: 29.1 PG (ref 26–34)
MCHC RBC AUTO-ENTMCNC: 31.6 G/DL (ref 30–36.5)
MCV RBC AUTO: 92.1 FL (ref 80–99)
MONOCYTES # BLD: 0.3 K/UL (ref 0–1)
MONOCYTES NFR BLD: 8 % (ref 5–13)
NEUTS SEG # BLD: 2.8 K/UL (ref 1.8–8)
NEUTS SEG NFR BLD: 77 % (ref 32–75)
NRBC # BLD: 0 K/UL (ref 0–0.01)
NRBC BLD-RTO: 0 PER 100 WBC
PLATELET # BLD AUTO: 190 K/UL (ref 150–400)
PMV BLD AUTO: 9.5 FL (ref 8.9–12.9)
POTASSIUM SERPL-SCNC: 5.1 MMOL/L (ref 3.5–5.1)
PROT SERPL-MCNC: 7.9 G/DL (ref 6.4–8.2)
RBC # BLD AUTO: 2.65 M/UL (ref 3.8–5.2)
SAMPLES BEING HELD,HOLD: NORMAL
SODIUM SERPL-SCNC: 137 MMOL/L (ref 136–145)
WBC # BLD AUTO: 3.7 K/UL (ref 3.6–11)

## 2019-05-18 PROCEDURE — 36415 COLL VENOUS BLD VENIPUNCTURE: CPT

## 2019-05-18 PROCEDURE — 80053 COMPREHEN METABOLIC PANEL: CPT

## 2019-05-18 PROCEDURE — 74011000250 HC RX REV CODE- 250: Performed by: EMERGENCY MEDICINE

## 2019-05-18 PROCEDURE — 99284 EMERGENCY DEPT VISIT MOD MDM: CPT

## 2019-05-18 PROCEDURE — 74011250637 HC RX REV CODE- 250/637: Performed by: EMERGENCY MEDICINE

## 2019-05-18 PROCEDURE — 76856 US EXAM PELVIC COMPLETE: CPT

## 2019-05-18 PROCEDURE — 76830 TRANSVAGINAL US NON-OB: CPT

## 2019-05-18 PROCEDURE — 85025 COMPLETE CBC W/AUTO DIFF WBC: CPT

## 2019-05-18 PROCEDURE — 83690 ASSAY OF LIPASE: CPT

## 2019-05-18 RX ORDER — SUCRALFATE 1 G/1
1 TABLET ORAL
Status: COMPLETED | OUTPATIENT
Start: 2019-05-18 | End: 2019-05-18

## 2019-05-18 RX ORDER — ACETAMINOPHEN 325 MG/1
650 TABLET ORAL
Status: COMPLETED | OUTPATIENT
Start: 2019-05-18 | End: 2019-05-18

## 2019-05-18 RX ORDER — POLYETHYLENE GLYCOL 3350 17 G/17G
POWDER, FOR SOLUTION ORAL
Qty: 500 G | Refills: 0 | Status: SHIPPED | OUTPATIENT
Start: 2019-05-18 | End: 2019-10-18

## 2019-05-18 RX ORDER — BISMUTH SUBSALICYLATE 262 MG/15ML
30 LIQUID ORAL
Qty: 354 ML | Refills: 0 | Status: SHIPPED | OUTPATIENT
Start: 2019-05-18 | End: 2019-10-18

## 2019-05-18 RX ORDER — BISMUTH SUBSALICYLATE 262 MG/15ML
30 LIQUID ORAL
Status: DISCONTINUED | OUTPATIENT
Start: 2019-05-18 | End: 2019-05-18

## 2019-05-18 RX ADMIN — ACETAMINOPHEN 650 MG: 325 TABLET ORAL at 13:26

## 2019-05-18 RX ADMIN — SUCRALFATE 1 G: 1 TABLET ORAL at 16:14

## 2019-05-18 RX ADMIN — LIDOCAINE HYDROCHLORIDE 40 ML: 20 SOLUTION ORAL; TOPICAL at 13:26

## 2019-05-18 NOTE — ED TRIAGE NOTES
Triage Note: Patient was seen here on Tues/Wed and diagnosed with an ovarian cyst. Patient continues to have pain. Patient followed up with OB/GYN as directed.  Denies N/V.

## 2019-05-18 NOTE — ED PROVIDER NOTES
The history is provided by the patient. Abdominal Pain    This is a recurrent problem. Episode onset: 1 week ago. The problem occurs constantly. Progression since onset: waxing and waning in waves of 1-2 minutes followed by relief. Associated with: recemnt biopsy of adnexal mass. The pain is located in the LUQ, RUQ, epigastric region and periumbilical region. The quality of the pain is aching, cramping and sharp. Pertinent negatives include no fever, no hematochezia, no melena, no vomiting and no trauma. Nothing worsens the pain. The pain is relieved by nothing. Past workup includes CT scan, ultrasound. Past workup comments: biopsy of adnexal mass. Her past medical history is significant for ovarian cysts. Past medical history comments: chronic back pain on opiates chronically. Past Medical History:   Diagnosis Date    Anemia     secondary to lupus    Asthma     no inhaler use in past 2 to 3 years    Carditis     Chronic kidney disease     ESRD    Chronic pain     DDD (degenerative disc disease), lumbar     ESRD (end stage renal disease) (HCC)     GERD (gastroesophageal reflux disease)     Hemodialysis patient ([facility] 2017    [address]  Tuesday,  Thursday,  and Saturday. 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  Poor historian 2018    With medications    Thromboembolus ([facility] 2013    lungs    Transfusion history     Last Transfusion 2017  at [address] [place] [address]       Past Surgical History:   Procedure Laterality Date    HX  SECTION  11/2006    x1    HX OTHER SURGICAL  9/16/15    INSERTION PD CATH;  Removed 2017    HX VASCULAR ACCESS Right 2017 Double-Lumen henry catheter upper chest    HX VASCULAR ACCESS Right 2018    right upper arm         Family History:   Problem Relation Age of Onset    Diabetes Father     Hypertension Father     Cancer Other         aunt with breast cancer    Diabetes Mother        Social History     Socioeconomic History    Marital status: SINGLE     Spouse name: Not on file    Number of children: Not on file    Years of education: Not on file    Highest education level: Not on file   Occupational History    Not on file   Social Needs    Financial resource strain: Not on file    Food insecurity:     Worry: Not on file     Inability: Not on file    Transportation needs:     Medical: Not on file     Non-medical: Not on file   Tobacco Use    Smoking status: Never Smoker    Smokeless tobacco: Never Used   Substance and Sexual Activity    Alcohol use: No    Drug use: Yes     Types: Prescription, OTC    Sexual activity: Not Currently   Lifestyle    Physical activity:     Days per week: Not on file     Minutes per session: Not on file    Stress: Not on file   Relationships    Social connections:     Talks on phone: Not on file     Gets together: Not on file     Attends Yazdanism service: Not on file     Active member of club or organization: Not on file     Attends meetings of clubs or organizations: Not on file     Relationship status: Not on file    Intimate partner violence:     Fear of current or ex partner: Not on file     Emotionally abused: Not on file     Physically abused: Not on file     Forced sexual activity: Not on file   Other Topics Concern     Service No    Blood Transfusions No    Caffeine Concern No    Occupational Exposure No    Hobby Hazards No    Sleep Concern No    Stress Concern No    Weight Concern No    Special Diet No    Back Care Yes     Comment: low back pain    Exercise No    Bike Helmet No    Seat Belt Yes    Self-Exams No   Social History Narrative    Lives with parents and daughter. ALLERGIES: Compazine [prochlorperazine edisylate] and Compazine [prochlorperazine]    Review of Systems   Constitutional: Negative for fever. Gastrointestinal: Positive for abdominal pain. Negative for hematochezia, melena and vomiting. All other systems reviewed and are negative. Vitals:    05/18/19 1249 05/18/19 1300   BP: (!) 153/102 (!) 144/102   Pulse: 99 99   Resp: 16 16   Temp: 99.6 °F (37.6 °C)    SpO2: 98% 96%   Weight: 86.9 kg (191 lb 9.3 oz)    Height: 5' 5\" (1.651 m)             Physical Exam   Constitutional: She appears well-developed and well-nourished. No distress. HENT:   Head: Normocephalic and atraumatic. Eyes: Conjunctivae are normal.   Neck: Neck supple. Cardiovascular: Normal rate, regular rhythm and normal heart sounds. Pulmonary/Chest: Effort normal and breath sounds normal. No respiratory distress. Abdominal: She exhibits no distension. There is tenderness (diffuse periumbilical and upper abdomen, no pelvic tenderness). There is no guarding. Musculoskeletal: Normal range of motion. She exhibits no deformity. Neurological: She is alert. No cranial nerve deficit. Skin: Skin is warm and dry. Psychiatric: Her behavior is normal.   Nursing note and vitals reviewed. MDM     36 y.o. female presents with upper abdominal pain in contrast to her previously lower abdominal pain where she was found to have a cyst versus mass. US performed shows no significant bleeding from biopsy site and flow in both ovaries. No hemoperitoneum. Hb stable. No WBC elevation or clinical signs of sudhir/appy. Pain is colicky in nature and may be secondary to moderate stool burden noted on recent CT which is from chronic narcotic meds. Miralax cleanout recommended as well as bismuth salts for supportive care. Plan to follow up with PCP as needed and return precautions discussed for worsening or new concerning symptoms.      Procedures

## 2019-05-20 NOTE — PROGRESS NOTES
Outbound call to patient to follow-up for ED visit on 5/14/19 for abdominal pain. Patient verified by 3 identifiers. Patient states she is still not feeling her best. She saw her GYN provider on Wednesday 5/15/19 and still having some pain. Not having the extreme bleeding as when she went to the Ed , but still some. Felling weak  Still. The dialysis seems to make her feel worse. Patient is waiting results of biopsy to determine if she needs surgery. Medications reviewed and PCP appointment offered. Patient declined stating she will call if needed. Will follow up with GYN for now. Current Outpatient Medications:     polyethylene glycol (MIRALAX) 17 gram/dose powder, Put 8 capfuls of Miralax in a 32 ounce beverage and drink it, followed by 3 capfuls twice daily for the next week and follow up with your primary care physician, Disp: 500 g, Rfl: 0    bismuth subsalicylate (PEPTO-BISMOL) 262 mg/15 mL suspension, Take 30 mL by mouth every four (4) hours as needed for Indigestion (diarrhea). May take 30 mL every 30 minutes as needed for up to 8 doses initially. , Disp: 354 mL, Rfl: 0    oxyCODONE-acetaminophen (PERCOCET) 5-325 mg per tablet, Take 1 Tab by mouth two (2) times daily as needed for Pain (severe back pain) for up to 30 days.  Max Daily Amount: 2 Tabs., Disp: 40 Tab, Rfl: 0    amitriptyline (ELAVIL) 50 mg tablet, TAKE 1 TABLET BY MOUTH EVERY NIGHT AT BEDTIME, Disp: 90 Tab, Rfl: 4    minoxidil (LONITEN) 2.5 mg tablet, , Disp: , Rfl:     losartan (COZAAR) 100 mg tablet, , Disp: , Rfl:     hydroxychloroquine (PLAQUENIL) 200 mg tablet, TAKE 2 TABLETS BY MOUTH DAILY, Disp: 180 Tab, Rfl: 6    ondansetron (ZOFRAN ODT) 8 mg disintegrating tablet, Take 1 Tab by mouth every eight (8) hours as needed for Nausea., Disp: 15 Tab, Rfl: 0    acetaminophen (TYLENOL EXTRA STRENGTH) 500 mg tablet, Take 2 Tabs by mouth every six (6) hours as needed for Pain., Disp: 30 Tab, Rfl: 0    ferric citrate (AURYXIA) 210 mg iron tablet, Take 210 mg by mouth three (3) times daily (with meals). , Disp: , Rfl:     carvedilol (COREG) 25 mg tablet, Take 1 Tab by mouth two (2) times daily (with meals). , Disp: 60 Tab, Rfl: 0    amLODIPine (NORVASC) 10 mg tablet, Take 1 Tab by mouth daily. , Disp: 30 Tab, Rfl: 0    predniSONE (DELTASONE) 5 mg tablet, Take 2 Tabs by mouth daily. , Disp: 30 Tab, Rfl: 0    senna (SENNA) 8.6 mg tablet, Take 1 Tab by mouth daily as needed for Constipation. for constipation, Disp: , Rfl:     gemfibrozil (LOPID) 600 mg tablet, Take 300 mg by mouth daily. , Disp: , Rfl:     azaTHIOprine (IMURAN) 50 mg tablet, Take 50 mg by mouth daily (after breakfast). , Disp: , Rfl:     albuterol (PROVENTIL HFA, VENTOLIN HFA, PROAIR HFA) 90 mcg/actuation inhaler, Take 1-2 Puffs by inhalation every four (4) hours as needed for Wheezing or Shortness of Breath., Disp: 1 Inhaler, Rfl: 2    pantoprazole (PROTONIX) 40 mg tablet, Take 1 Tab by mouth Daily (before breakfast). , Disp: 30 Tab, Rfl: 0    Goals Addressed                 This Visit's Progress     Attends follow-up appointments as directed. · Discussed importance of follow-up appointment with GYN, Hematologist and Neurologist  · Scheduled to follow up with PCP on 4/26/19  · Will obtain referrals for GYN and Hematologist at next PCP visit  · Declined  or [facility]  · NN will follow in one week. 05/10/19  · Follow up with neurology completed. · Patient will follow up with OB/GYN this week. · Continued chronic back pain  Neurology:   · Continues taking Percocet 5/ 325 one to two tabs per day. · Have been gradually reducing # of hydrocodone tablets, last visit given Rx for #45 tablets. NN will follow in one week. pk    5/17/19  · Patient had follow up with GYN on 5/15/19  · Waiting results of biopsy  · Still not feeling her best with some spotting  · Decline PCP/[facility] follow up at this time.   · NN will follow in one week. pk.       management of anemia post hospitalization         [facility] admit 4/18/19-4/21/19 for Vaginal Bleeding  · Reviewed Red flags: you passed out or loss of consciousness  · Your have severe vaginal bleeding  · You become dizzy or lightheaded or feel you may faint  · You have worse or new belly or pelvic pain  · Take only meds ordered for pain  · Be sure to make and go to all appointments, and call your doctor if you are having problems. · It's also a good idea to know your test results and keep a list of the medicines you take. · Discussed patient's Lab goals and when to call providers  · Reviewed discharged instructions  NN will follow in one week. pk    5/17/19  · Medications reconciliation completed  · Reviewed upcoming appointments  · Continue to had some stomach discomfort  · Will follow up with GYN for new orders  · NN will follow in one week. pk            Case management Plan:  NN will continue to attempt contacts with patient by telephone or during office visit within next 7-10 days.  Will continue to follow as necessary for the remaining 30 days post visit and will reassess for needs before discharge

## 2019-05-21 ENCOUNTER — PATIENT OUTREACH (OUTPATIENT)
Dept: FAMILY MEDICINE CLINIC | Age: 34
End: 2019-05-21

## 2019-05-23 NOTE — PROGRESS NOTES
[facility] Ed admit for abdominal pain on 19. Patient verbalized that she continued to have abdominal pain, and it occurs constantly. Verified by 3 identifiers. Patient was seen at [facility] Ed on Tues/Wednesday for the same. Patient says she was followed by her GYN provider that had biopsied her last week for an adrenal mass, and excessive bleeding. .  ED Discharge Follow-Up    Date/Time:     2019 3:11 PM    Patient presented to [facility] ED on 19 and was diagnosed with abdominal cramping. Top Challenges reviewed with the provider   [facility] Ed admit for abdominal cramping  · High Ed utilizer  · US=no significant bleeding from biopsy site and flow in both ovaries  · Recommended Miralax clean out and bismuth salts  · Renal fuction elevated-dialysis patient, and anemic-consider repeat BMP and CBC  Results for Enrique Barrett (MRN [id number]) as of 2019 15:19    5/15/2019 02:30 2019 13:45   Sodium  134 (L) 137   Potassium  5.2 (H) 5.1     5/15/2019 02:30 2019 13:45   BUN  42 (H) 47 (H)   Creatinine  6.73 (H) 8.62 (H)          Method of communication with provider :chart routing, staff message    Nurse Navigator(NN) contacted the  patient  by telephone to perform post ED discharge assessment. Verified name and  with patient as identifiers. Provided introduction to self, and explanation of the Nurse Navigator role. Patient reported assessment: Continued to have spasms of  pain in my stomach, and some light bleeding. Denies fever.     Medication(s):   New Medications at Discharge: Miralax, Pepto-bismol  Changed Medications at Discharge: none  Discontinued Medications at Discharge: none    Current Outpatient Medications:     polyethylene glycol (MIRALAX) 17 gram/dose powder, Put 8 capfuls of Miralax in a 32 ounce beverage and drink it, followed by 3 capfuls twice daily for the next week and follow up with your primary care physician, Disp: 500 g, Rfl: 0    bismuth subsalicylate (PEPTO-BISMOL) 262 mg/15 mL suspension, Take 30 mL by mouth every four (4) hours as needed for Indigestion (diarrhea). May take 30 mL every 30 minutes as needed for up to 8 doses initially. , Disp: 354 mL, Rfl: 0    oxyCODONE-acetaminophen (PERCOCET) 5-325 mg per tablet, Take 1 Tab by mouth two (2) times daily as needed for Pain (severe back pain) for up to 30 days. Max Daily Amount: 2 Tabs., Disp: 40 Tab, Rfl: 0    amitriptyline (ELAVIL) 50 mg tablet, TAKE 1 TABLET BY MOUTH EVERY NIGHT AT BEDTIME, Disp: 90 Tab, Rfl: 4    minoxidil (LONITEN) 2.5 mg tablet, , Disp: , Rfl:     losartan (COZAAR) 100 mg tablet, , Disp: , Rfl:     hydroxychloroquine (PLAQUENIL) 200 mg tablet, TAKE 2 TABLETS BY MOUTH DAILY, Disp: 180 Tab, Rfl: 6    ondansetron (ZOFRAN ODT) 8 mg disintegrating tablet, Take 1 Tab by mouth every eight (8) hours as needed for Nausea., Disp: 15 Tab, Rfl: 0    acetaminophen (TYLENOL EXTRA STRENGTH) 500 mg tablet, Take 2 Tabs by mouth every six (6) hours as needed for Pain., Disp: 30 Tab, Rfl: 0    ferric citrate (AURYXIA) 210 mg iron tablet, Take 210 mg by mouth three (3) times daily (with meals). , Disp: , Rfl:     carvedilol (COREG) 25 mg tablet, Take 1 Tab by mouth two (2) times daily (with meals). , Disp: 60 Tab, Rfl: 0    amLODIPine (NORVASC) 10 mg tablet, Take 1 Tab by mouth daily. , Disp: 30 Tab, Rfl: 0    predniSONE (DELTASONE) 5 mg tablet, Take 2 Tabs by mouth daily. , Disp: 30 Tab, Rfl: 0    senna (SENNA) 8.6 mg tablet, Take 1 Tab by mouth daily as needed for Constipation. for constipation, Disp: , Rfl:     gemfibrozil (LOPID) 600 mg tablet, Take 300 mg by mouth daily. , Disp: , Rfl:     azaTHIOprine (IMURAN) 50 mg tablet, Take 50 mg by mouth daily (after breakfast). , Disp: , Rfl:     albuterol (PROVENTIL HFA, VENTOLIN HFA, PROAIR HFA) 90 mcg/actuation inhaler, Take 1-2 Puffs by inhalation every four (4) hours as needed for Wheezing or Shortness of Breath., Disp: 1 Inhaler, Rfl: 2    pantoprazole (PROTONIX) 40 mg tablet, Take 1 Tab by mouth Daily (before breakfast). , Disp: 30 Tab, Rfl: 0  There were no barriers to obtaining medications identified at this time. Reviewed discharge instructions and red flags with  patient who voiced understanding. Patient given an opportunity to ask questions and does not have any further questions or concerns at this time. The patient agrees to contact the PCP office for questions related to their healthcare. Patient reminded that there are physicians on call 24 hours a day / 7 days a week (M-F 5pm to 8am and from Friday 5pm until Monday 8am for the weekend) should the patient have questions or concerns. NN provided contact information for future reference. Offered follow up appointment with PCP: yes, patient declined at this time. [facility] follow up appointment(s):   Future Appointments   Date Time Provider Rocio Hardy   6/3/2019  1:00 PM Sally Colin MD [address]   8/5/2019  9:30 AM Leandro Eldridge MD [address] [place] [address]      Non-[facility] follow up appointment(s): none  [facility]:  offered and patient declined    www. [email or web address] 9 AM- 9 PM.   Phone [phone]      Goals      management of anemia post hospitalization       [facility] admit 4/18/19-4/21/19 for Vaginal Bleeding  · Reviewed Red flags: you passed out or loss of consciousness  · Your have severe vaginal bleeding  · You become dizzy or lightheaded or feel you may faint  · You have worse or new belly or pelvic pain  · Take only meds ordered for pain  · Be sure to make and go to all appointments, and call your doctor if you are having problems. · It's also a good idea to know your test results and keep a list of the medicines you take. · Discussed patient's Lab goals and when to call providers  · Reviewed discharged instructions  NN will follow in one week.  pk    5/17/19  · Medications reconciliation completed  · Reviewed upcoming appointments  · Continue to had some stomach discomfort  · Will follow up with GYN for new orders  · NN will follow in one week. pk         Prevent complications post ED visit      · Discussed importance of follow-up appointment with GYN, Hematologist and Neurologist  · Scheduled to follow up with PCP on 4/26/19  · Will obtain referrals for GYN and Hematologist at next PCP visit  · Declined  or [facility]  · NN will follow in one week. 05/10/19  · Follow up with neurology completed. · Patient will follow up with OB/GYN this week. · Continued chronic back pain  · Continues taking Percocet 5/ 325 one to two tabs per day. · Have been gradually reducing # of hydrocodone tablets, last visit given Rx for #45 tablets. NN will follow in one week. pk    5/17/19  · Patient had follow up with GYN on 5/15/19  · Waiting results of biopsy  · Still not feeling her best with some spotting  · Decline PCP/[facility] follow up at this time. · NN will follow in one week. Pk.    05/21/19  · Patient reports she is feeling better  · Did have relief with Miralax  · Still has occasional bleeding and the pain  · Decline [facility] visit  · Declined PCP visit, will call if pain becomes worse  · Aware of biopsy results  · Blood draws to monitor renal function and anemia being done at dialysis  · Reports dialysis schedule as Tue, Thur, Sat. NN will follow in one week.  pk

## 2019-05-29 ENCOUNTER — TELEPHONE (OUTPATIENT)
Dept: NEUROLOGY | Age: 34
End: 2019-05-29

## 2019-05-29 DIAGNOSIS — M54.50 CHRONIC BILATERAL LOW BACK PAIN WITHOUT SCIATICA: ICD-10-CM

## 2019-05-29 DIAGNOSIS — G89.29 CHRONIC BILATERAL LOW BACK PAIN WITHOUT SCIATICA: ICD-10-CM

## 2019-05-29 RX ORDER — OXYCODONE AND ACETAMINOPHEN 5; 325 MG/1; MG/1
1 TABLET ORAL
Qty: 40 TAB | Refills: 0 | Status: SHIPPED | OUTPATIENT
Start: 2019-06-05 | End: 2019-06-24 | Stop reason: SDUPTHER

## 2019-05-29 RX ORDER — OXYCODONE AND ACETAMINOPHEN 5; 325 MG/1; MG/1
1 TABLET ORAL
Qty: 40 TAB | Refills: 0 | Status: CANCELLED | OUTPATIENT
Start: 2019-05-29 | End: 2019-06-28

## 2019-05-29 NOTE — TELEPHONE ENCOUNTER
Contacted patient to let her know that Arley Opitz has printed out the Rx for percocet and it is ready for her to come and  at the [place] office. She verbalized understanding and stated that she will pick it up tomorrow.

## 2019-06-06 ENCOUNTER — PATIENT OUTREACH (OUTPATIENT)
Dept: FAMILY MEDICINE CLINIC | Age: 34
End: 2019-06-06

## 2019-06-06 NOTE — PROGRESS NOTES
SURJIT Follow-up assessment: SPT Ed admit for abdominal pain  Outbound call made to patient today to complete SURJIT follow up assessment. Patient verified by 3 identifiers. Follow-up appointments reviewed, and medication reconciliation completed. NN contact information given for questions and concerns. Current Outpatient Medications:     oxyCODONE-acetaminophen (PERCOCET) 5-325 mg per tablet, Take 1 Tab by mouth two (2) times daily as needed for Pain (severe back pain) for up to 30 days. Max Daily Amount: 2 Tabs., Disp: 40 Tab, Rfl: 0    polyethylene glycol (MIRALAX) 17 gram/dose powder, Put 8 capfuls of Miralax in a 32 ounce beverage and drink it, followed by 3 capfuls twice daily for the next week and follow up with your primary care physician, Disp: 500 g, Rfl: 0    bismuth subsalicylate (PEPTO-BISMOL) 262 mg/15 mL suspension, Take 30 mL by mouth every four (4) hours as needed for Indigestion (diarrhea). May take 30 mL every 30 minutes as needed for up to 8 doses initially. , Disp: 354 mL, Rfl: 0    amitriptyline (ELAVIL) 50 mg tablet, TAKE 1 TABLET BY MOUTH EVERY NIGHT AT BEDTIME, Disp: 90 Tab, Rfl: 4    minoxidil (LONITEN) 2.5 mg tablet, , Disp: , Rfl:     losartan (COZAAR) 100 mg tablet, , Disp: , Rfl:     hydroxychloroquine (PLAQUENIL) 200 mg tablet, TAKE 2 TABLETS BY MOUTH DAILY, Disp: 180 Tab, Rfl: 6    ondansetron (ZOFRAN ODT) 8 mg disintegrating tablet, Take 1 Tab by mouth every eight (8) hours as needed for Nausea., Disp: 15 Tab, Rfl: 0    acetaminophen (TYLENOL EXTRA STRENGTH) 500 mg tablet, Take 2 Tabs by mouth every six (6) hours as needed for Pain., Disp: 30 Tab, Rfl: 0    ferric citrate (AURYXIA) 210 mg iron tablet, Take 210 mg by mouth three (3) times daily (with meals). , Disp: , Rfl:     carvedilol (COREG) 25 mg tablet, Take 1 Tab by mouth two (2) times daily (with meals). , Disp: 60 Tab, Rfl: 0    amLODIPine (NORVASC) 10 mg tablet, Take 1 Tab by mouth daily. , Disp: 30 Tab, Rfl: 0   predniSONE (DELTASONE) 5 mg tablet, Take 2 Tabs by mouth daily. , Disp: 30 Tab, Rfl: 0    senna (SENNA) 8.6 mg tablet, Take 1 Tab by mouth daily as needed for Constipation. for constipation, Disp: , Rfl:     gemfibrozil (LOPID) 600 mg tablet, Take 300 mg by mouth daily. , Disp: , Rfl:     azaTHIOprine (IMURAN) 50 mg tablet, Take 50 mg by mouth daily (after breakfast). , Disp: , Rfl:     albuterol (PROVENTIL HFA, VENTOLIN HFA, PROAIR HFA) 90 mcg/actuation inhaler, Take 1-2 Puffs by inhalation every four (4) hours as needed for Wheezing or Shortness of Breath., Disp: 1 Inhaler, Rfl: 2    pantoprazole (PROTONIX) 40 mg tablet, Take 1 Tab by mouth Daily (before breakfast). , Disp: 30 Tab, Rfl: 0     Goals Addressed                 This Visit's Progress     management of anemia post hospitalization   On [facility] admit 4/18/19-4/21/19 for Vaginal Bleeding  · Reviewed Red flags: you passed out or loss of consciousness  · Your have severe vaginal bleeding  · You become dizzy or lightheaded or feel you may faint  · You have worse or new belly or pelvic pain  · Take only meds ordered for pain  · Be sure to make and go to all appointments, and call your doctor if you are having problems. · It's also a good idea to know your test results and keep a list of the medicines you take. · Discussed patient's Lab goals and when to call providers  · Reviewed discharged instructions  NN will follow in one week. pk    5/17/19  · Medications reconciliation completed  · Reviewed upcoming appointments  · Continue to had some stomach discomfort  · Will follow up with GYN for new orders  · NN will follow in one week. pk    06/06/19    · Admits to feeling okay, still some pain at back and stomach  · Percocet order renewed by Teena Baldwin MD on 5/29/19  · Neurology follow-up scheduled for 6/24/19  · No results yet from biopsy, patient will call her GYN for follow up. · NN will follow in one week.  pk         Prevent complications post ED visit   On track     · Discussed importance of follow-up appointment with GYN, Hematologist and Neurologist  · Scheduled to follow up with PCP on 4/26/19  · Will obtain referrals for GYN and Hematologist at next PCP visit  · Declined  or [facility]  · NN will follow in one week. 05/10/19  · Follow up with neurology completed. · Patient will follow up with OB/GYN this week. · Continued chronic back pain  · Continues taking Percocet 5/ 325 one to two tabs per day. · Have been gradually reducing # of hydrocodone tablets, last visit given Rx for #45 tablets. NN will follow in one week. pk    5/17/19  · Patient had follow up with GYN on 5/15/19  · Waiting results of biopsy  · Still not feeling her best with some spotting  · Decline PCP/[facility] follow up at this time. · NN will follow in one week. Pk.    05/21/19  · Patient reports she is feeling better  · Did have relief with Miralax  · Still has occasional bleeding and the pain  · Decline [facility] visit  · Declined PCP visit, will call if pain becomes worse  · Aware of biopsy results  · Blood draws to monitor renal function and anemia being done at dialysis  · Reports dialysis schedule as [facility], Sat. NN will follow in one week. pk            Case management Plan:  NN will continue to attempt contacts with patient by telephone or during office visit within next 7-10 days.  Will continue to follow as necessary for the remaining 30 days post visit and will reassess for needs before discharge

## 2019-06-20 ENCOUNTER — PATIENT OUTREACH (OUTPATIENT)
Dept: FAMILY MEDICINE CLINIC | Age: 34
End: 2019-06-20

## 2019-06-24 ENCOUNTER — OFFICE VISIT (OUTPATIENT)
Dept: NEUROLOGY | Age: 34
End: 2019-06-24

## 2019-06-24 VITALS
DIASTOLIC BLOOD PRESSURE: 90 MMHG | OXYGEN SATURATION: 99 % | RESPIRATION RATE: 20 BRPM | SYSTOLIC BLOOD PRESSURE: 132 MMHG | BODY MASS INDEX: 31.82 KG/M2 | HEIGHT: 65 IN | HEART RATE: 85 BPM | WEIGHT: 191 LBS

## 2019-06-24 DIAGNOSIS — G89.29 CHRONIC BILATERAL LOW BACK PAIN WITHOUT SCIATICA: ICD-10-CM

## 2019-06-24 DIAGNOSIS — M54.50 CHRONIC BILATERAL LOW BACK PAIN WITHOUT SCIATICA: ICD-10-CM

## 2019-06-24 RX ORDER — OXYCODONE AND ACETAMINOPHEN 5; 325 MG/1; MG/1
1 TABLET ORAL
Qty: 40 TAB | Refills: 0 | Status: SHIPPED | OUTPATIENT
Start: 2019-06-24 | End: 2019-07-22 | Stop reason: SDUPTHER

## 2019-06-24 NOTE — PROGRESS NOTES
Interval HPI:     This is a 36 y.o. female who is following up for     PMHx: chronic back pain, hx of fibromyalgia, hx of DVT/ coumadin, hx of Lupus, mild lower lumbar disc degeneration (L4-5 and L5-S1 with small annular tear at L5-S1)    Chief Complaint   Patient presents with    Back Pain     Follow Up     No significant change in moderate lower back pain. Has been having significant pelvic cramping and bleeding related to ovarian cyst.  IIs working with Ob-Gyn and now on has Mirena IUD    Continues taking Percocet 5/ 325 one to two tabs per day. Some days taking up to 3 tabs if pelvic cramping is worse. Tried/ failed: Gabapentin, Cymbalta, Amitriptyline (only helped get to sleep), Lyrica (abnormal behaviors), Hydrocodone (\"too strong\")    Reviewed : no abberant behaviors  Last filled on 4-3-19 (#45 tabs)     UDS Hx:  March 2017: consistent with Rx pain medication  9-13-17 UDS: only screened for codeine or morphine, which pt doesn't take  1-3-18: serum Drug screen: consistent with Rx pain medication (oxycodone)  11-19-18: serum drug screen; consistent with Rx pain medication (oxycodone)      Brief ROS: as above      Past Medical History:   Diagnosis Date    Anemia     secondary to lupus    Asthma     no inhaler use in past 2 to 3 years    Carditis     Chronic kidney disease     ESRD    Chronic pain     DDD (degenerative disc disease), lumbar     [facility]) [facility]     GERD (gastroesophageal reflux disease)     Hemodialysis patient ([facility] 12/21/2017    [address]  Tuesday,  Thursday,  and Saturday. 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  Poor historian 2018    With medications    Thromboembolus ([address] 2013    lungs    Transfusion history     Last Transfusion 2017  at [facility]       Past Surgical History:   Procedure Laterality Date    HX  SECTION  11/2006    x1    HX OTHER SURGICAL  9/16/15    INSERTION PD CATH;  Removed 2017    HX VASCULAR ACCESS Right 2017    Double-Lumen henry catheter upper chest    HX VASCULAR ACCESS Right 2018    right upper arm       Family History   Problem Relation Age of Onset    Diabetes Father     Hypertension Father     Cancer Other         aunt with breast cancer    Diabetes Mother        Social History     Socioeconomic History    Marital status: SINGLE     Spouse name: Not on file    Number of children: Not on file    Years of education: Not on file    Highest education level: Not on file   Occupational History    Not on file   Social Needs    Financial resource strain: Not on file    Food insecurity:     Worry: Not on file     Inability: Not on file    Transportation needs:     Medical: Not on file     Non-medical: Not on file   Tobacco Use    Smoking status: Never Smoker    Smokeless tobacco: Never Used   Substance and Sexual Activity    Alcohol use: No    Drug use: Yes     Types: Prescription, OTC    Sexual activity: Not Currently   Lifestyle    Physical activity:     Days per week: Not on file     Minutes per session: Not on file    Stress: Not on file   Relationships    Social connections:     Talks on phone: Not on file     Gets together: Not on file     Attends Restoration service: Not on file     Active member of club or organization: Not on file     Attends meetings of clubs or organizations: Not on file     Relationship status: Not on file    Intimate partner violence:     Fear of current or ex partner: Not on file     Emotionally abused: Not on file     Physically abused: Not on file     Forced sexual activity: Not on file   Other Topics Concern     Service No    Blood Transfusions No    Caffeine Concern No    Occupational Exposure No    Hobby Hazards No    Sleep Concern No    Stress Concern No    Weight Concern No    Special Diet No    Back Care Yes     Comment: low back pain    Exercise No    Bike Helmet No    Seat Belt Yes    Self-Exams No   Social History Narrative    Lives with parents and daughter. Allergies   Allergen Reactions    Compazine [Prochlorperazine Edisylate] Nausea and Vomiting and Palpitations    Compazine [Prochlorperazine] Other (comments)     Hot and lightheaded. Current Outpatient Medications   Medication Sig Dispense Refill    oxyCODONE-acetaminophen (PERCOCET) 5-325 mg per tablet Take 1 Tab by mouth two (2) times daily as needed for Pain (severe back pain) for up to 30 days. Max Daily Amount: 2 Tabs. 40 Tab 0    polyethylene glycol (MIRALAX) 17 gram/dose powder Put 8 capfuls of Miralax in a 32 ounce beverage and drink it, followed by 3 capfuls twice daily for the next week and follow up with your primary care physician 500 g 0    bismuth subsalicylate (PEPTO-BISMOL) 262 mg/15 mL suspension Take 30 mL by mouth every four (4) hours as needed for Indigestion (diarrhea). May take 30 mL every 30 minutes as needed for up to 8 doses initially. 354 mL 0    amitriptyline (ELAVIL) 50 mg tablet TAKE 1 TABLET BY MOUTH EVERY NIGHT AT BEDTIME 90 Tab 4    minoxidil (LONITEN) 2.5 mg tablet       losartan (COZAAR) 100 mg tablet       hydroxychloroquine (PLAQUENIL) 200 mg tablet TAKE 2 TABLETS BY MOUTH DAILY 180 Tab 6    ondansetron (ZOFRAN ODT) 8 mg disintegrating tablet Take 1 Tab by mouth every eight (8) hours as needed for Nausea. 15 Tab 0    acetaminophen (TYLENOL EXTRA STRENGTH) 500 mg tablet Take 2 Tabs by mouth every six (6) hours as needed for Pain. 30 Tab 0    ferric citrate (AURYXIA) 210 mg iron tablet Take 210 mg by mouth three (3) times daily (with meals).       carvedilol (COREG) 25 mg tablet Take 1 Tab by mouth two (2) times daily (with meals). 60 Tab 0    amLODIPine (NORVASC) 10 mg tablet Take 1 Tab by mouth daily. 30 Tab 0    predniSONE (DELTASONE) 5 mg tablet Take 2 Tabs by mouth daily. 30 Tab 0    senna (SENNA) 8.6 mg tablet Take 1 Tab by mouth daily as needed for Constipation. for constipation      gemfibrozil (LOPID) 600 mg tablet Take 300 mg by mouth daily.  azaTHIOprine (IMURAN) 50 mg tablet Take 50 mg by mouth daily (after breakfast).  albuterol (PROVENTIL HFA, VENTOLIN HFA, PROAIR HFA) 90 mcg/actuation inhaler Take 1-2 Puffs by inhalation every four (4) hours as needed for Wheezing or Shortness of Breath. 1 Inhaler 2    pantoprazole (PROTONIX) 40 mg tablet Take 1 Tab by mouth Daily (before breakfast). 30 Tab 0       Physical Exam  Vitals:    06/24/19 1327   BP: 132/90   BP 1 Location: Left arm   BP Patient Position: Sitting   Pulse: 85   Resp: 20   SpO2: 99%   Weight: 86.6 kg (191 lb)   Height: 5' 5\" (1.651 m)       Awake, alert, conversant  Neck: supple  Ext: dialysis fistula in right forearm    MSK:  Lumbar spine:  + tender to palpation across lower lumbar paraspinal muscles  Mild pain with back extension > flexion       Focused Neurological Exam     Mental status:   Alert and oriented to person, place situation  Mood appears stable  Normal thought processes    CNs: EOMI, Face symmetric, Hearing/ Language normal  Sensory: intact light touch  Motor: 4/ 5 strength in arms and legs    Reflexes: not examined today  Gait: normal    Impression    ICD-10-CM ICD-9-CM    1. Chronic bilateral low back pain without sciatica M54.5 724.2 oxyCODONE-acetaminophen (PERCOCET) 5-325 mg per tablet    G89.29 338.29         1) Chronic bilateral lower back pain without sciatica  Pain agreeable to continued downward opioid taper but she asks for 1 month delay as she going out of town / vacation with Daughter (to [place], [employer], [place]).   Will keep pain meds at [facility] one tab up to BID, #40 tablets this month.  Next month will continue with tapering down to #30 tabs/ month      Signed By: Johanna Gonzalez MD     June 24, 2019

## 2019-06-24 NOTE — PROGRESS NOTES
Patient is here for a follow up for pain mgmt. She states that she has been feeling okay, back has been still bothering her and she is still menstruating. No other concerns at this time.

## 2019-06-29 ENCOUNTER — HOSPITAL ENCOUNTER (OUTPATIENT)
Dept: GENERAL RADIOLOGY | Age: 34
Discharge: HOME | End: 2019-06-29
Payer: MEDICARE

## 2019-06-29 DIAGNOSIS — Z11.1 VISIT FOR TB SKIN TEST: ICD-10-CM

## 2019-06-29 PROCEDURE — 71046 X-RAY EXAM CHEST 2 VIEWS: CPT

## 2019-07-15 ENCOUNTER — APPOINTMENT (OUTPATIENT)
Dept: CT IMAGING | Age: 34
DRG: 193 | End: 2019-07-15
Attending: EMERGENCY MEDICINE
Payer: MEDICARE

## 2019-07-15 ENCOUNTER — HOSPITAL ENCOUNTER (INPATIENT)
Age: 34
LOS: 2 days | Discharge: HOME | DRG: 193 | End: 2019-07-18
Attending: EMERGENCY MEDICINE | Admitting: HOSPITALIST
Payer: MEDICARE

## 2019-07-15 ENCOUNTER — APPOINTMENT (OUTPATIENT)
Dept: GENERAL RADIOLOGY | Age: 34
DRG: 193 | End: 2019-07-15
Attending: EMERGENCY MEDICINE
Payer: MEDICARE

## 2019-07-15 DIAGNOSIS — R10.84 GENERALIZED ABDOMINAL PAIN: ICD-10-CM

## 2019-07-15 DIAGNOSIS — R09.02 HYPOXIA: ICD-10-CM

## 2019-07-15 DIAGNOSIS — E87.5 ACUTE HYPERKALEMIA: ICD-10-CM

## 2019-07-15 DIAGNOSIS — Z99.2 STAGE 5 CHRONIC KIDNEY DISEASE ON CHRONIC DIALYSIS (HCC): Primary | ICD-10-CM

## 2019-07-15 DIAGNOSIS — N18.6 STAGE 5 CHRONIC KIDNEY DISEASE ON CHRONIC DIALYSIS (HCC): Primary | ICD-10-CM

## 2019-07-15 DIAGNOSIS — R50.9 FEVER, UNSPECIFIED FEVER CAUSE: ICD-10-CM

## 2019-07-15 LAB
ALBUMIN SERPL-MCNC: 3.1 G/DL (ref 3.5–5)
ALBUMIN/GLOB SERPL: 0.6 {RATIO} (ref 1.1–2.2)
ALP SERPL-CCNC: 75 U/L (ref 45–117)
ALT SERPL-CCNC: 12 U/L (ref 12–78)
ANION GAP SERPL CALC-SCNC: 14 MMOL/L (ref 5–15)
ARTERIAL PATENCY WRIST A: YES
ARTERIAL PATENCY WRIST A: YES
AST SERPL-CCNC: 24 U/L (ref 15–37)
BASE DEFICIT BLD-SCNC: 2 MMOL/L
BASE DEFICIT BLD-SCNC: 3 MMOL/L
BASOPHILS # BLD: 0 K/UL (ref 0–0.1)
BASOPHILS NFR BLD: 0 % (ref 0–1)
BDY SITE: ABNORMAL
BDY SITE: ABNORMAL
BILIRUB SERPL-MCNC: 0.3 MG/DL (ref 0.2–1)
BNP SERPL-MCNC: ABNORMAL PG/ML (ref 0–125)
BUN SERPL-MCNC: 74 MG/DL (ref 6–20)
BUN/CREAT SERPL: 7 (ref 12–20)
CALCIUM SERPL-MCNC: 7.8 MG/DL (ref 8.5–10.1)
CHLORIDE SERPL-SCNC: 97 MMOL/L (ref 97–108)
CO2 SERPL-SCNC: 24 MMOL/L (ref 21–32)
COMMENT, HOLDF: NORMAL
CREAT SERPL-MCNC: 10.09 MG/DL (ref 0.55–1.02)
D DIMER PPP FEU-MCNC: 1.8 MG/L FEU (ref 0–0.65)
DIFFERENTIAL METHOD BLD: ABNORMAL
EOSINOPHIL # BLD: 0 K/UL (ref 0–0.4)
EOSINOPHIL NFR BLD: 0 % (ref 0–7)
ERYTHROCYTE [DISTWIDTH] IN BLOOD BY AUTOMATED COUNT: 12.9 % (ref 11.5–14.5)
FLUAV AG NPH QL IA: NEGATIVE
FLUBV AG NOSE QL IA: NEGATIVE
GAS FLOW.O2 O2 DELIVERY SYS: ABNORMAL L/MIN
GAS FLOW.O2 O2 DELIVERY SYS: ABNORMAL L/MIN
GAS FLOW.O2 SETTING OXYMISER: 4 L/M
GAS FLOW.O2 SETTING OXYMISER: 4 L/M
GLOBULIN SER CALC-MCNC: 4.9 G/DL (ref 2–4)
GLUCOSE SERPL-MCNC: 96 MG/DL (ref 65–100)
HCO3 BLD-SCNC: 21.5 MMOL/L (ref 22–26)
HCO3 BLD-SCNC: 23.3 MMOL/L (ref 22–26)
HCT VFR BLD AUTO: 29.1 % (ref 35–47)
HGB BLD-MCNC: 9.2 G/DL (ref 11.5–16)
IMM GRANULOCYTES # BLD AUTO: 0.1 K/UL
IMM GRANULOCYTES NFR BLD AUTO: 1 %
LACTATE SERPL-SCNC: 0.8 MMOL/L (ref 0.4–2)
LYMPHOCYTES # BLD: 0.4 K/UL (ref 0.8–3.5)
LYMPHOCYTES NFR BLD: 6 % (ref 12–49)
MCH RBC QN AUTO: 28.8 PG (ref 26–34)
MCHC RBC AUTO-ENTMCNC: 31.6 G/DL (ref 30–36.5)
MCV RBC AUTO: 91.2 FL (ref 80–99)
MONOCYTES # BLD: 0.2 K/UL (ref 0–1)
MONOCYTES NFR BLD: 3 % (ref 5–13)
NEUTS SEG # BLD: 6.1 K/UL (ref 1.8–8)
NEUTS SEG NFR BLD: 90 % (ref 32–75)
NRBC # BLD: 0 K/UL (ref 0–0.01)
NRBC BLD-RTO: 0 PER 100 WBC
PCO2 BLD: 31.4 MMHG (ref 35–45)
PCO2 BLD: 39.3 MMHG (ref 35–45)
PH BLD: 7.38 [PH] (ref 7.35–7.45)
PH BLD: 7.44 [PH] (ref 7.35–7.45)
PLATELET # BLD AUTO: 157 K/UL (ref 150–400)
PMV BLD AUTO: 10.9 FL (ref 8.9–12.9)
PO2 BLD: 32 MMHG (ref 80–100)
PO2 BLD: 92 MMHG (ref 80–100)
POTASSIUM SERPL-SCNC: 6 MMOL/L (ref 3.5–5.1)
PROT SERPL-MCNC: 8 G/DL (ref 6.4–8.2)
RBC # BLD AUTO: 3.19 M/UL (ref 3.8–5.2)
RBC MORPH BLD: ABNORMAL
SAMPLES BEING HELD,HOLD: NORMAL
SAO2 % BLD: 60 % (ref 92–97)
SAO2 % BLD: 98 % (ref 92–97)
SODIUM SERPL-SCNC: 135 MMOL/L (ref 136–145)
SPECIMEN TYPE: ABNORMAL
SPECIMEN TYPE: ABNORMAL
TROPONIN I SERPL-MCNC: <0.05 NG/ML
WBC # BLD AUTO: 6.8 K/UL (ref 3.6–11)

## 2019-07-15 PROCEDURE — 85025 COMPLETE CBC W/AUTO DIFF WBC: CPT

## 2019-07-15 PROCEDURE — 74011000250 HC RX REV CODE- 250: Performed by: EMERGENCY MEDICINE

## 2019-07-15 PROCEDURE — 74011636320 HC RX REV CODE- 636/320: Performed by: EMERGENCY MEDICINE

## 2019-07-15 PROCEDURE — 36415 COLL VENOUS BLD VENIPUNCTURE: CPT

## 2019-07-15 PROCEDURE — 83605 ASSAY OF LACTIC ACID: CPT

## 2019-07-15 PROCEDURE — 87804 INFLUENZA ASSAY W/OPTIC: CPT

## 2019-07-15 PROCEDURE — 94640 AIRWAY INHALATION TREATMENT: CPT

## 2019-07-15 PROCEDURE — 94762 N-INVAS EAR/PLS OXIMTRY CONT: CPT

## 2019-07-15 PROCEDURE — 99284 EMERGENCY DEPT VISIT MOD MDM: CPT

## 2019-07-15 PROCEDURE — 74011250637 HC RX REV CODE- 250/637: Performed by: EMERGENCY MEDICINE

## 2019-07-15 PROCEDURE — 71045 X-RAY EXAM CHEST 1 VIEW: CPT

## 2019-07-15 PROCEDURE — 74011636637 HC RX REV CODE- 636/637: Performed by: EMERGENCY MEDICINE

## 2019-07-15 PROCEDURE — 82803 BLOOD GASES ANY COMBINATION: CPT

## 2019-07-15 PROCEDURE — 85379 FIBRIN DEGRADATION QUANT: CPT

## 2019-07-15 PROCEDURE — 99218 HC RM OBSERVATION: CPT

## 2019-07-15 PROCEDURE — 93005 ELECTROCARDIOGRAM TRACING: CPT

## 2019-07-15 PROCEDURE — 80053 COMPREHEN METABOLIC PANEL: CPT

## 2019-07-15 PROCEDURE — 96375 TX/PRO/DX INJ NEW DRUG ADDON: CPT

## 2019-07-15 PROCEDURE — 83880 ASSAY OF NATRIURETIC PEPTIDE: CPT

## 2019-07-15 PROCEDURE — 71275 CT ANGIOGRAPHY CHEST: CPT

## 2019-07-15 PROCEDURE — 87040 BLOOD CULTURE FOR BACTERIA: CPT

## 2019-07-15 PROCEDURE — 84484 ASSAY OF TROPONIN QUANT: CPT

## 2019-07-15 PROCEDURE — 96365 THER/PROPH/DIAG IV INF INIT: CPT

## 2019-07-15 PROCEDURE — 77030029684 HC NEB SM VOL KT MONA -A

## 2019-07-15 PROCEDURE — 36600 WITHDRAWAL OF ARTERIAL BLOOD: CPT

## 2019-07-15 PROCEDURE — 74011000258 HC RX REV CODE- 258: Performed by: EMERGENCY MEDICINE

## 2019-07-15 PROCEDURE — 74011250636 HC RX REV CODE- 250/636: Performed by: EMERGENCY MEDICINE

## 2019-07-15 PROCEDURE — 96366 THER/PROPH/DIAG IV INF ADDON: CPT

## 2019-07-15 RX ORDER — SODIUM CHLORIDE 0.9 % (FLUSH) 0.9 %
5-40 SYRINGE (ML) INJECTION AS NEEDED
Status: DISCONTINUED | OUTPATIENT
Start: 2019-07-15 | End: 2019-07-18 | Stop reason: HOSPADM

## 2019-07-15 RX ORDER — ALBUTEROL SULFATE 0.83 MG/ML
5 SOLUTION RESPIRATORY (INHALATION)
Status: COMPLETED | OUTPATIENT
Start: 2019-07-15 | End: 2019-07-15

## 2019-07-15 RX ORDER — SODIUM CHLORIDE 9 MG/ML
10 INJECTION INTRAMUSCULAR; INTRAVENOUS; SUBCUTANEOUS ONCE
Status: COMPLETED | OUTPATIENT
Start: 2019-07-15 | End: 2019-07-15

## 2019-07-15 RX ORDER — SODIUM CHLORIDE 0.9 % (FLUSH) 0.9 %
5-40 SYRINGE (ML) INJECTION EVERY 8 HOURS
Status: DISCONTINUED | OUTPATIENT
Start: 2019-07-15 | End: 2019-07-18 | Stop reason: HOSPADM

## 2019-07-15 RX ORDER — OXYCODONE AND ACETAMINOPHEN 5; 325 MG/1; MG/1
1 TABLET ORAL
Status: COMPLETED | OUTPATIENT
Start: 2019-07-15 | End: 2019-07-15

## 2019-07-15 RX ORDER — SODIUM CHLORIDE 9 MG/ML
50 INJECTION, SOLUTION INTRAVENOUS
Status: COMPLETED | OUTPATIENT
Start: 2019-07-15 | End: 2019-07-15

## 2019-07-15 RX ORDER — DEXTROSE MONOHYDRATE 100 MG/ML
250 INJECTION, SOLUTION INTRAVENOUS ONCE
Status: COMPLETED | OUTPATIENT
Start: 2019-07-15 | End: 2019-07-15

## 2019-07-15 RX ORDER — CALCIUM CHLORIDE INJECTION 100 MG/ML
1 INJECTION, SOLUTION INTRAVENOUS
Status: DISCONTINUED | OUTPATIENT
Start: 2019-07-15 | End: 2019-07-15

## 2019-07-15 RX ORDER — ACETAMINOPHEN 500 MG
1000 TABLET ORAL ONCE
Status: COMPLETED | OUTPATIENT
Start: 2019-07-15 | End: 2019-07-15

## 2019-07-15 RX ADMIN — ALBUTEROL SULFATE 5 MG: 2.5 SOLUTION RESPIRATORY (INHALATION) at 20:34

## 2019-07-15 RX ADMIN — SODIUM CHLORIDE 50 ML/HR: 900 INJECTION, SOLUTION INTRAVENOUS at 21:36

## 2019-07-15 RX ADMIN — IOPAMIDOL 80 ML: 755 INJECTION, SOLUTION INTRAVENOUS at 21:35

## 2019-07-15 RX ADMIN — ALBUTEROL SULFATE 5 MG: 2.5 SOLUTION RESPIRATORY (INHALATION) at 19:56

## 2019-07-15 RX ADMIN — HUMAN INSULIN 10 UNITS: 100 INJECTION, SOLUTION SUBCUTANEOUS at 19:58

## 2019-07-15 RX ADMIN — CALCIUM GLUCONATE 1 G: 94 INJECTION, SOLUTION INTRAVENOUS at 19:58

## 2019-07-15 RX ADMIN — SODIUM CHLORIDE 10 ML: 9 INJECTION INTRAMUSCULAR; INTRAVENOUS; SUBCUTANEOUS at 21:36

## 2019-07-15 RX ADMIN — VANCOMYCIN HYDROCHLORIDE 1000 MG: 1 INJECTION, POWDER, LYOPHILIZED, FOR SOLUTION INTRAVENOUS at 20:32

## 2019-07-15 RX ADMIN — ACETAMINOPHEN 1000 MG: 500 TABLET ORAL at 18:13

## 2019-07-15 RX ADMIN — OXYCODONE HYDROCHLORIDE AND ACETAMINOPHEN 1 TABLET: 5; 325 TABLET ORAL at 19:55

## 2019-07-15 RX ADMIN — ALBUTEROL SULFATE 5 MG: 2.5 SOLUTION RESPIRATORY (INHALATION) at 20:12

## 2019-07-15 RX ADMIN — DEXTROSE MONOHYDRATE 250 ML: 10 INJECTION, SOLUTION INTRAVENOUS at 20:12

## 2019-07-15 NOTE — ED PROVIDER NOTES
The history is provided by the patient and a relative. No  was used. Shortness of Breath   This is a new problem. The problem occurs intermittently. The current episode started yesterday. The problem has not changed since onset. Associated symptoms include a fever, cough, PND and orthopnea. Pertinent negatives include no headaches, no coryza, no rhinorrhea, no sore throat, no swollen glands, no ear pain, no neck pain, no sputum production, no hemoptysis, no wheezing, no chest pain, no syncope, no vomiting, no abdominal pain, no rash, no leg pain, no leg swelling and no claudication. Associated medical issues include asthma, pneumonia, chronic lung disease, PE and DVT. Associated medical issues do not include COPD, CAD, heart failure, past MI or recent surgery. Past Medical History:   Diagnosis Date    Anemia     secondary to lupus    Asthma     no inhaler use in past 2 to 3 years    Carditis     Chronic kidney disease     ESRD    Chronic pain     DDD (degenerative disc disease), lumbar     ESRD (end stage renal disease) (HCC)     GERD (gastroesophageal reflux disease)     Hemodialysis patient ([facility] [address] 2017    [address]  Tuesday,  Thursday,  and Saturday. 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  Poor historian 2018    With medications    Thromboembolus ([facility] [address] 2013    lungs    Transfusion history     Last Transfusion 2017  at [facility]       Past Surgical History:   Procedure Laterality Date    HX  SECTION  11/2006    x1    HX OTHER SURGICAL  9/16/15    INSERTION PD CATH;  Removed 2017    HX VASCULAR ACCESS Right 2017 Double-Lumen henry catheter upper chest    HX VASCULAR ACCESS Right 2018    right upper arm         Family History:   Problem Relation Age of Onset    Diabetes Father     Hypertension Father     Cancer Other         aunt with breast cancer    Diabetes Mother        Social History     Socioeconomic History    Marital status: SINGLE     Spouse name: Not on file    Number of children: Not on file    Years of education: Not on file    Highest education level: Not on file   Occupational History    Not on file   Social Needs    Financial resource strain: Not on file    Food insecurity:     Worry: Not on file     Inability: Not on file    Transportation needs:     Medical: Not on file     Non-medical: Not on file   Tobacco Use    Smoking status: Never Smoker    Smokeless tobacco: Never Used   Substance and Sexual Activity    Alcohol use: No    Drug use: Yes     Types: Prescription, OTC    Sexual activity: Not Currently   Lifestyle    Physical activity:     Days per week: Not on file     Minutes per session: Not on file    Stress: Not on file   Relationships    Social connections:     Talks on phone: Not on file     Gets together: Not on file     Attends [employer] service: Not on file     Active member of club or organization: Not on file     Attends meetings of clubs or organizations: Not on file     Relationship status: Not on file    Intimate partner violence:     Fear of current or ex partner: Not on file     Emotionally abused: Not on file     Physically abused: Not on file     Forced sexual activity: Not on file   Other Topics Concern     Service No    Blood Transfusions No    Caffeine Concern No    Occupational Exposure No    Hobby Hazards No    Sleep Concern No    Stress Concern No    Weight Concern No    Special Diet No    Back Care Yes     Comment: low back pain    Exercise No    Bike Helmet No    Seat Belt Yes    Self-Exams No   Social History Narrative    Lives with parents and daughter. ALLERGIES: Compazine [prochlorperazine edisylate]    Review of Systems   Constitutional: Positive for fatigue and fever. Negative for activity change and chills. HENT: Negative for ear pain, nosebleeds, rhinorrhea, sore throat, trouble swallowing and voice change. Eyes: Negative for visual disturbance. Respiratory: Positive for cough and shortness of breath. Negative for hemoptysis, sputum production and wheezing. Cardiovascular: Positive for orthopnea and PND. Negative for chest pain, palpitations, claudication, leg swelling and syncope. Gastrointestinal: Negative for abdominal pain, constipation, diarrhea, nausea and vomiting. Genitourinary: Negative for difficulty urinating, dysuria, hematuria and urgency. Musculoskeletal: Negative for back pain, neck pain and neck stiffness. Skin: Negative for color change and rash. Allergic/Immunologic: Negative for immunocompromised state. Neurological: Negative for dizziness, seizures, syncope, weakness, light-headedness, numbness and headaches. Psychiatric/Behavioral: Negative for behavioral problems, confusion, hallucinations, self-injury and suicidal ideas. Vitals:    07/15/19 1734   BP: (!) 166/107   Pulse: (!) 113   Resp: 27   Temp: (!) 102.7 °F (39.3 °C)   SpO2: 90%   Weight: 92.5 kg (203 lb 14.8 oz)   Height: 5' 5\" (1.651 m)            Physical Exam   Constitutional: She is oriented to person, place, and time. She appears well-developed and well-nourished. She has a sickly appearance. No distress. HENT:   Head: Normocephalic and atraumatic. Eyes: Pupils are equal, round, and reactive to light. Neck: Normal range of motion. Neck supple. Cardiovascular: Regular rhythm and normal heart sounds. Tachycardia present. Exam reveals no gallop and no friction rub. No murmur heard. Pulmonary/Chest: Tachypnea noted. She is in respiratory distress. She has decreased breath sounds. She has no wheezes. Abdominal: Soft. Bowel sounds are normal. She exhibits no distension. There is no tenderness. There is no rebound and no guarding. Musculoskeletal: Normal range of motion. Neurological: She is alert and oriented to person, place, and time. Skin: Skin is warm. No rash noted. She is not diaphoretic. Psychiatric: She has a normal mood and affect. Her behavior is normal. Judgment and thought content normal.   Nursing note and vitals reviewed. MDM  Number of Diagnoses or Management Options  Acute hyperkalemia:   Fever, unspecified fever cause:   Hypoxia:   Stage 5 chronic kidney disease on chronic dialysis [facility]):   Diagnosis management comments: Total critical care time spent exclusive of procedures:  50min       This is a 71-year-old female with past medical history, review of systems, physical exam as above resides in with complaints of shortness of breath, dyspnea on exertion, in the setting of a history of lupus nephritis, end-stage renal disease, Tuesday, Thursday, Saturday dialysis, previous PE, compliant with anticoagulation, and recent travel by plane. Physical exam is remarkable for uncomfortable young female, in mild respiratory distress, noted to be tachypneic, tachycardic, and febrile upon evaluation. She has diminished breath sounds, worse in the bilateral bases, soft abdomen, increased rate without murmurs gallops or rubs. Patient states she was dialyzed for her normal hours 2 days ago. Differential include pulmonary edema, pulmonary embolism, pneumonia. Procedures    7:37 PM  Patient discussed with Dr. Colten Costa, recommended transfer to [facility], [place], tx for hyperkalemia, will have dialyzed at [facility]. Hospitalist Wilbert for Admission  7:42 PM    ED Room Number: SER09/09  Patient Name and age:  Amadeo Martines 36 y.o.  female  Working Diagnosis:   1. Stage 5 chronic kidney disease on chronic dialysis ([address]    2. Acute hyperkalemia    3. Hypoxia    4.  Fever, unspecified fever cause Readmission: no  Isolation Requirements:  no  Recommended Level of Care:  step down  Code Status:  Full Code  Other:  Discussed with Dr. Tj Morillo

## 2019-07-15 NOTE — ED TRIAGE NOTES
TRIAGE NOTE: Patient arrived from home with c/o generalized pain, fatigue and shortness of breath. Dialysis patient tues/thurs/sat, has not missed any.

## 2019-07-16 PROBLEM — J18.9 HCAP (HEALTHCARE-ASSOCIATED PNEUMONIA): Status: ACTIVE | Noted: 2019-07-16

## 2019-07-16 LAB
ANION GAP SERPL CALC-SCNC: 11 MMOL/L (ref 5–15)
BUN SERPL-MCNC: 82 MG/DL (ref 6–20)
BUN/CREAT SERPL: 7 (ref 12–20)
CALCIUM SERPL-MCNC: 7.9 MG/DL (ref 8.5–10.1)
CHLORIDE SERPL-SCNC: 99 MMOL/L (ref 97–108)
CO2 SERPL-SCNC: 22 MMOL/L (ref 21–32)
CREAT SERPL-MCNC: 11.1 MG/DL (ref 0.55–1.02)
CRP SERPL HS-MCNC: >9.5 MG/L
ERYTHROCYTE [DISTWIDTH] IN BLOOD BY AUTOMATED COUNT: 12.8 % (ref 11.5–14.5)
ERYTHROCYTE [SEDIMENTATION RATE] IN BLOOD: 70 MM/HR (ref 0–20)
GLUCOSE SERPL-MCNC: 95 MG/DL (ref 65–100)
HCT VFR BLD AUTO: 24.9 % (ref 35–47)
HGB BLD-MCNC: 7.8 G/DL (ref 11.5–16)
MAGNESIUM SERPL-MCNC: 2.5 MG/DL (ref 1.6–2.4)
MCH RBC QN AUTO: 29.3 PG (ref 26–34)
MCHC RBC AUTO-ENTMCNC: 31.3 G/DL (ref 30–36.5)
MCV RBC AUTO: 93.6 FL (ref 80–99)
NRBC # BLD: 0 K/UL (ref 0–0.01)
NRBC BLD-RTO: 0 PER 100 WBC
PHOSPHATE SERPL-MCNC: 8 MG/DL (ref 2.6–4.7)
PLATELET # BLD AUTO: 118 K/UL (ref 150–400)
PMV BLD AUTO: 11 FL (ref 8.9–12.9)
POTASSIUM SERPL-SCNC: 5.2 MMOL/L (ref 3.5–5.1)
RBC # BLD AUTO: 2.66 M/UL (ref 3.8–5.2)
SODIUM SERPL-SCNC: 132 MMOL/L (ref 136–145)
WBC # BLD AUTO: 7.1 K/UL (ref 3.6–11)

## 2019-07-16 PROCEDURE — 5A1D70Z PERFORMANCE OF URINARY FILTRATION, INTERMITTENT, LESS THAN 6 HOURS PER DAY: ICD-10-PCS | Performed by: INTERNAL MEDICINE

## 2019-07-16 PROCEDURE — 90935 HEMODIALYSIS ONE EVALUATION: CPT

## 2019-07-16 PROCEDURE — 74011250637 HC RX REV CODE- 250/637: Performed by: HOSPITALIST

## 2019-07-16 PROCEDURE — 85027 COMPLETE CBC AUTOMATED: CPT

## 2019-07-16 PROCEDURE — 85652 RBC SED RATE AUTOMATED: CPT

## 2019-07-16 PROCEDURE — 74011000258 HC RX REV CODE- 258: Performed by: HOSPITALIST

## 2019-07-16 PROCEDURE — 74011250636 HC RX REV CODE- 250/636: Performed by: HOSPITALIST

## 2019-07-16 PROCEDURE — 65270000029 HC RM PRIVATE

## 2019-07-16 PROCEDURE — 80048 BASIC METABOLIC PNL TOTAL CA: CPT

## 2019-07-16 PROCEDURE — 74011636637 HC RX REV CODE- 636/637: Performed by: HOSPITALIST

## 2019-07-16 PROCEDURE — 83735 ASSAY OF MAGNESIUM: CPT

## 2019-07-16 PROCEDURE — 99218 HC RM OBSERVATION: CPT

## 2019-07-16 PROCEDURE — 84100 ASSAY OF PHOSPHORUS: CPT

## 2019-07-16 PROCEDURE — 86141 C-REACTIVE PROTEIN HS: CPT

## 2019-07-16 PROCEDURE — 36415 COLL VENOUS BLD VENIPUNCTURE: CPT

## 2019-07-16 RX ORDER — ONDANSETRON 2 MG/ML
4 INJECTION INTRAMUSCULAR; INTRAVENOUS
Status: DISCONTINUED | OUTPATIENT
Start: 2019-07-16 | End: 2019-07-18 | Stop reason: HOSPADM

## 2019-07-16 RX ORDER — OXYCODONE HYDROCHLORIDE 5 MG/1
5 TABLET ORAL
Status: DISCONTINUED | OUTPATIENT
Start: 2019-07-16 | End: 2019-07-18 | Stop reason: HOSPADM

## 2019-07-16 RX ORDER — PREDNISONE 10 MG/1
10 TABLET ORAL DAILY
Status: DISCONTINUED | OUTPATIENT
Start: 2019-07-16 | End: 2019-07-18 | Stop reason: HOSPADM

## 2019-07-16 RX ORDER — AZATHIOPRINE 50 MG/1
50 TABLET ORAL
Status: DISCONTINUED | OUTPATIENT
Start: 2019-07-16 | End: 2019-07-18 | Stop reason: HOSPADM

## 2019-07-16 RX ORDER — CARVEDILOL 12.5 MG/1
25 TABLET ORAL 2 TIMES DAILY WITH MEALS
Status: DISCONTINUED | OUTPATIENT
Start: 2019-07-16 | End: 2019-07-18 | Stop reason: HOSPADM

## 2019-07-16 RX ORDER — AMLODIPINE BESYLATE 5 MG/1
10 TABLET ORAL DAILY
Status: DISCONTINUED | OUTPATIENT
Start: 2019-07-16 | End: 2019-07-16

## 2019-07-16 RX ORDER — HYDROXYCHLOROQUINE SULFATE 200 MG/1
400 TABLET, FILM COATED ORAL
Status: DISCONTINUED | OUTPATIENT
Start: 2019-07-16 | End: 2019-07-18 | Stop reason: HOSPADM

## 2019-07-16 RX ORDER — ACETAMINOPHEN 325 MG/1
650 TABLET ORAL
Status: DISCONTINUED | OUTPATIENT
Start: 2019-07-16 | End: 2019-07-18 | Stop reason: HOSPADM

## 2019-07-16 RX ORDER — PANTOPRAZOLE SODIUM 40 MG/1
40 TABLET, DELAYED RELEASE ORAL
Status: DISCONTINUED | OUTPATIENT
Start: 2019-07-16 | End: 2019-07-18 | Stop reason: HOSPADM

## 2019-07-16 RX ORDER — HEPARIN SODIUM 5000 [USP'U]/ML
5000 INJECTION, SOLUTION INTRAVENOUS; SUBCUTANEOUS EVERY 8 HOURS
Status: DISCONTINUED | OUTPATIENT
Start: 2019-07-16 | End: 2019-07-18 | Stop reason: HOSPADM

## 2019-07-16 RX ORDER — OXYCODONE AND ACETAMINOPHEN 5; 325 MG/1; MG/1
1 TABLET ORAL
Status: DISCONTINUED | OUTPATIENT
Start: 2019-07-16 | End: 2019-07-16

## 2019-07-16 RX ORDER — LOSARTAN POTASSIUM 50 MG/1
100 TABLET ORAL 2 TIMES DAILY
Status: DISCONTINUED | OUTPATIENT
Start: 2019-07-16 | End: 2019-07-16

## 2019-07-16 RX ADMIN — Medication 10 ML: at 22:00

## 2019-07-16 RX ADMIN — VANCOMYCIN HYDROCHLORIDE 750 MG: 750 INJECTION, POWDER, LYOPHILIZED, FOR SOLUTION INTRAVENOUS at 18:10

## 2019-07-16 RX ADMIN — PIPERACILLIN SODIUM,TAZOBACTAM SODIUM 3.38 G: 3; .375 INJECTION, POWDER, FOR SOLUTION INTRAVENOUS at 01:16

## 2019-07-16 RX ADMIN — HYDROXYCHLOROQUINE SULFATE 400 MG: 200 TABLET, FILM COATED ORAL at 08:32

## 2019-07-16 RX ADMIN — ACETAMINOPHEN 650 MG: 325 TABLET ORAL at 21:10

## 2019-07-16 RX ADMIN — AZATHIOPRINE 50 MG: 50 TABLET ORAL at 08:32

## 2019-07-16 RX ADMIN — CARVEDILOL 25 MG: 12.5 TABLET, FILM COATED ORAL at 08:32

## 2019-07-16 RX ADMIN — PANTOPRAZOLE SODIUM 40 MG: 40 TABLET, DELAYED RELEASE ORAL at 07:32

## 2019-07-16 RX ADMIN — Medication 10 ML: at 13:04

## 2019-07-16 RX ADMIN — PREDNISONE 10 MG: 10 TABLET ORAL at 08:32

## 2019-07-16 RX ADMIN — VANCOMYCIN HYDROCHLORIDE 1000 MG: 1 INJECTION, POWDER, LYOPHILIZED, FOR SOLUTION INTRAVENOUS at 01:28

## 2019-07-16 RX ADMIN — Medication 10 ML: at 01:19

## 2019-07-16 RX ADMIN — HEPARIN SODIUM 5000 UNITS: 5000 INJECTION INTRAVENOUS; SUBCUTANEOUS at 10:13

## 2019-07-16 RX ADMIN — OXYCODONE HYDROCHLORIDE 5 MG: 5 TABLET ORAL at 17:39

## 2019-07-16 RX ADMIN — CARVEDILOL 25 MG: 12.5 TABLET, FILM COATED ORAL at 17:29

## 2019-07-16 RX ADMIN — OXYCODONE HYDROCHLORIDE AND ACETAMINOPHEN 1 TABLET: 5; 325 TABLET ORAL at 04:25

## 2019-07-16 RX ADMIN — HEPARIN SODIUM 5000 UNITS: 5000 INJECTION INTRAVENOUS; SUBCUTANEOUS at 04:20

## 2019-07-16 RX ADMIN — Medication 10 ML: at 07:32

## 2019-07-16 RX ADMIN — OXYCODONE HYDROCHLORIDE 5 MG: 5 TABLET ORAL at 23:51

## 2019-07-16 RX ADMIN — HEPARIN SODIUM 5000 UNITS: 5000 INJECTION INTRAVENOUS; SUBCUTANEOUS at 17:29

## 2019-07-16 RX ADMIN — PIPERACILLIN SODIUM,TAZOBACTAM SODIUM 3.38 G: 3; .375 INJECTION, POWDER, FOR SOLUTION INTRAVENOUS at 17:30

## 2019-07-16 RX ADMIN — OXYCODONE HYDROCHLORIDE AND ACETAMINOPHEN 1 TABLET: 5; 325 TABLET ORAL at 10:19

## 2019-07-16 NOTE — PROGRESS NOTES
Bedside and Verbal shift change report given to [employer] (oncoming nurse) by FRANCESCA Davis RN (offgoing nurse). Report given with SBAR, Kardex, Intake/Output, MAR and Recent Results.

## 2019-07-16 NOTE — PROGRESS NOTES
0000: TRANSFER - IN REPORT:    Verbal report received from [facility], MAGGY (name) on Angella Jimenez  being received from [place] [facility] (unit) for routine progression of care      Report consisted of patients Situation, Background, Assessment and   Recommendations(SBAR). Information from the following report(s) SBAR, Kardex, ED Summary, Intake/Output, Recent Results and Cardiac Rhythm ST was reviewed with the receiving nurse. Opportunity for questions and clarification was provided. Assessment completed upon patients arrival to unit and care assumed. 0730: Bedside and Verbal shift change report given to Scott Adhikari (oncoming nurse) by Linda Pena (offgoing nurse). Report included the following information SBAR, Kardex, OR Summary, Recent Results and Cardiac Rhythm NSR. Problem: Falls - Risk of  Goal: *Absence of Falls  Description  Document Hallie Late Fall Risk and appropriate interventions in the flowsheet. Outcome: Progressing Towards Goal  Note:   Fall Risk Interventions:     Medication Interventions: Patient to call before getting OOB, Teach patient to arise slowly     Up ad gonsalo with steady gait. Call bell and personal effects within reach. Bed locked and in low position. Non skid footwear in place. Problem: Pneumonia: Day 1  Goal: Diagnostic Test/Procedures  Outcome: Progressing Towards Goal  Note:   Labs drawn as ordered and monitored for results.     Goal: Respiratory  Outcome: Progressing Towards Goal  Note:   Reassurance and emotional support offered  Goal: Psychosocial  Outcome: Progressing Towards Goal  Goal: *Oxygen saturation within defined limits  Outcome: Progressing Towards Goal  Note:   SpO2 WDL on 2.5 L NC  Goal: *Hemodynamically stable  Outcome: Progressing Towards Goal  Note:   Visit Vitals  /69 (BP 1 Location: Left arm, BP Patient Position: At rest)   Pulse (!) 104   Temp 98.3 °F (36.8 °C)   Resp 19   Ht 5' 5\" (1.651 m)   Wt 93.8 kg (206 lb 12.7 oz)   SpO2 100% BMI 34.41 kg/m²       Goal: *Demonstrates progressive activity  Outcome: Progressing Towards Goal

## 2019-07-16 NOTE — PROCEDURES
[facility] D[facility] Team [facility]  [phone]                                                   Vitals   Pre   Post   Assessment   Pre   Post     Temp  Temp: 98.3  98.2 LOC  Sleepy, lethargic. Responds appropriately. A/Ox4   HR   Pulse (Heart Rate): 145 89 Lungs   Clear   Clear    B/P   BP: 122/82 142/95 Cardiac   S1 S2 auscultated. RRR. S1 S2 auscultated. RRR.    Resp   Resp Rate: 16  Skin   Clean, dry intact. Clean,dry, intact. Pain level  Pain Intensity 1: 0 (07/10/19 1122) 0 Edema  Generalized       Generalized. Orders:        Duration:   Start:   1325 End:   9915 Total:   2 hours 50 mins       Dialyzer:   Dialyzer/Set Up Inspection: Revaclear        K Bath:   Dialysate K (mEq/L): 2        Ca Bath:   Dialysate CA (mEq/L): 2.5        Na/Bicarb:   Dialysate NA (mEq/L): 140        Target Fluid Removal:   Goal/Amount of Fluid to Remove (mL): 3000 mL        Access         Type & Location:   PETER AVG. No S/S of infection. +thrill and bruit.                         Medications/ Blood Products Given         Name   Dose   Route and Time         No meds ordered or given.                                   Blood Volume Processed (BVP):    60L Net Fluid   Removed:  2600ml         Comments   Time Out Done: Yes 7680   Primary Nurse Rpt Pre: Rosie Fong RN  Primary Nurse Rpt Post: Sheri Cockayne, RN  Pt Education: Procedural   Care Plan: Ongoing   Tx Summary: PETER AVG. +thrill and bruit. No S/S of infection. Cannulated with 15g needles at both arterial and venous sites. +aspiration and NS flushes. Pt tolerated tx well. With 40 minutes left venous access was too elevated. I attempted to adjust the needle but could not get pressure to the appropriate levels. Blood in circuit was not able to be flushed back due to access. Dr. Brooklyn Schulz was paged and ordered me to end the tx. Needles were removed, pressure was held until no bleeding noted, dressings applied and secured. +thrill and bruit.  Report given to Julienne Handler RN. All Dialysis related medications have been reviewed.    Admiting Diagnosis: SOB   Pt's previous clinic-    Consent signed - Informed Consent Verified: Yes   [facility] Consent - Signed & Filed   Hepatitis Status-Negative Ag 7/4/19   Immune Ab 7/4/19  Machine #- Machine Number: [id number]  Telemetry status- Bedside and remote. .   Pre-dialysis wt. - 99.2

## 2019-07-16 NOTE — PROGRESS NOTES
Zosyn dose adjustment  Indication:  HAP  Current regimen:  2.25gm q12h  Abx regimen: vancomycin  Recent Labs     07/15/19  1750   WBC 6.8   CREA 10.09*   BUN 74*     Est CrCl: 9 ml/min,  dialysis Tues,Thurs,Sat  Temp (24hrs), Av.3 °F (37.9 °C), Min:98.3 °F (36.8 °C), Max:102.7 °F (39.3 °C)    Cultures: pending    Plan: Change to 3.375gm q12h

## 2019-07-16 NOTE — PROGRESS NOTES
Hospitalist Progress Note  Saul Lopez MD  Answering service: [phone] from in house phone  Cell: [phone]      Date of Service:  2019  NAME:  Peter Lozano  :  1985  MRN:  [id number]      Admission Summary:     A 36years old lady with ESRD on HD, SLE, HTN, VTE, who presented to [place] ER with dyspnea. Symptoms began 2 days ago. She developed a cough and fever followed by dyspnea. No exacerbating or alleviating factors. She denies missing HD. Interval history / Subjective:     She said she has pain all over and she thinks that her lupus flare up, she has on and off cough but no left side chest pain     Assessment & Plan:     Acute hypoxic respiratory failure due to pneumonia and pulmonary edema  -continue HD, IV zosyn and vancomycin, oxygen support, prn duo neb   -CTA chest no PE, RUL consolidation with pneumonia difficult to exclude, bilateral airspace disease compatible with pulmonary edema  -chest x ra on 7/15 pulmonary edema and small pleural effusions    HCAP   -continue IV zosyn and vancomycin , oxygen support, monitor pulse ox  -has fever, Tmax 102.7 on 7/15, no leukocytosis  -blood cx no growth so far     ESRD DD   -HD per nephrologist     Anemia of chronic disease   -H/H trending down, no evidence of bleeding  -monitor H/H and transfuse for Hgb <7.0    HTN  -BP improving and normal today, continue coreg, monitoring BP    Chronic SLE ??  Acute flare   -continue azathioprine, plaquinil and prednisone   -check CRP and ESR    Mild hyponatremia and hyperkalemia   -HD patient repeat bmp in am    Acute thrombocytopenia possible due to infection   -, repeat cbc in am    Code status: Full Code  DVT prophylaxis: heparin     Care Plan discussed with: Patient/Family and Nurse  Disposition: TBD     Hospital Problems  Date Reviewed: 2019          Codes Class Noted POA    HCAP (healthcare-associated pneumonia) ICD-10-CM: J18.9  ICD-9-CM: 891  7/16/2019 Unknown        Hyperkalemia ICD-10-CM: E87.5  ICD-9-CM: 276.7  3/4/2019 Unknown              Vital Signs:    Last 24hrs VS reviewed since prior progress note. Most recent are:  Visit Vitals  /86 (BP 1 Location: Left arm, BP Patient Position: At rest)   Pulse 93   Temp 98.7 °F (37.1 °C)   Resp 18   Ht 5' 5\" (1.651 m)   Wt 93.8 kg (206 lb 12.7 oz)   SpO2 100%   BMI 34.41 kg/m²         Intake/Output Summary (Last 24 hours) at 7/16/2019 0743  Last data filed at 7/15/2019 2242  Gross per 24 hour   Intake 50 ml   Output --   Net 50 ml        Physical Examination:             Constitutional:  No acute distress, cooperative, pleasant    ENT:  Oral mucous moist, oropharynx benign. Neck supple,    Resp:  Decrease bronchial breath sound bilaterally. No wheezing/rhonchi/rales. No accessory muscle use   CV:  Regular rhythm, normal rate, no murmurs, gallops, rubs    GI:  Soft, non distended, non tender. normoactive bowel sounds, no hepatosplenomegaly     Musculoskeletal:  No edema    Neurologic:  Moves all extremities. AAOx3, CN II-XII reviewed     Skin:  Good turgor, no rashes or ulcers       Data Review:    Review and/or order of clinical lab test  Review and/or order of tests in the radiology section of CPT  Review and/or order of tests in the medicine section of CPT      Labs:     Recent Labs     07/16/19  0611 07/15/19  1750   WBC 7.1 6.8   HGB 7.8* 9.2*   HCT 24.9* 29.1*   * 157     Recent Labs     07/15/19  1750   *   K 6.0*   CL 97   CO2 24   BUN 74*   CREA 10.09*   GLU 96   CA 7.8*     Recent Labs     07/15/19  1750   SGOT 24   ALT 12   AP 75   TBILI 0.3   TP 8.0   ALB 3.1*   GLOB 4.9*     No results for input(s): INR, PTP, APTT in the last 72 hours. No lab exists for component: INREXT   No results for input(s): FE, TIBC, PSAT, FERR in the last 72 hours.    Lab Results   Component Value Date/Time    Folate 13.7 06/17/2018 03:00 PM      No results for input(s): PH, PCO2, PO2 in the last 72 hours.   Recent Labs     07/15/19  1750   TROIQ <0.05     Lab Results   Component Value Date/Time    Cholesterol, total 140 09/11/2018 06:17 AM    HDL Cholesterol 43 09/11/2018 06:17 AM    LDL, calculated 60.4 09/11/2018 06:17 AM    Triglyceride 183 (H) 09/11/2018 06:17 AM    CHOL/HDL Ratio 3.3 09/11/2018 06:17 AM     Lab Results   Component Value Date/Time    Glucose (POC) 90 04/19/2019 06:30 AM    Glucose (POC) 84 03/05/2019 08:11 AM    Glucose (POC) 104 (H) 08/16/2018 11:06 AM    Glucose (POC) 164 (H) 07/22/2018 11:43 AM    Glucose (POC) 138 (H) 07/21/2018 09:22 PM     Lab Results   Component Value Date/Time    Color YELLOW/STRAW 03/31/2019 06:07 PM    Appearance CLEAR 03/31/2019 06:07 PM    Specific gravity 1.015 03/31/2019 06:07 PM    Specific gravity 1.017 08/12/2016 09:06 PM    pH (UA) 8.5 (H) 03/31/2019 06:07 PM    Protein 100 (A) 03/31/2019 06:07 PM    Glucose NEGATIVE  03/31/2019 06:07 PM    Ketone NEGATIVE  03/31/2019 06:07 PM    Bilirubin NEGATIVE  03/31/2019 06:07 PM    Urobilinogen 0.2 03/31/2019 06:07 PM    Nitrites NEGATIVE  03/31/2019 06:07 PM    Leukocyte Esterase NEGATIVE  03/31/2019 06:07 PM    Epithelial cells MODERATE (A) 03/31/2019 06:07 PM    Bacteria NEGATIVE  03/31/2019 06:07 PM    WBC 0-4 03/31/2019 06:07 PM    RBC 0-5 03/31/2019 06:07 PM         Medications Reviewed:     Current Facility-Administered Medications   Medication Dose Route Frequency    piperacillin-tazobactam (ZOSYN) 3.375 g in 0.9% sodium chloride (MBP/ADV) 100 mL  3.375 g IntraVENous Q12H    carvedilol (COREG) tablet 25 mg  25 mg Oral BID WITH MEALS    hydroxychloroquine (PLAQUENIL) tablet 400 mg  400 mg Oral DAILY WITH BREAKFAST    oxyCODONE-acetaminophen (PERCOCET) 5-325 mg per tablet 1 Tab  1 Tab Oral Q6H PRN    pantoprazole (PROTONIX) tablet 40 mg  40 mg Oral ACB    predniSONE (DELTASONE) tablet 10 mg  10 mg Oral DAILY    acetaminophen (TYLENOL) tablet 650 mg  650 mg Oral Q6H PRN    ondansetron (ZOFRAN) injection 4 mg  4 mg IntraVENous Q4H PRN    azaTHIOprine (IMURAN) tablet 50 mg  50 mg Oral DAILY AFTER BREAKFAST    heparin (porcine) injection 5,000 Units  5,000 Units SubCUTAneous Q8H    Vancomycin Pharmacy Dosing - Dialysis pt   Other Rx Dosing/Monitoring    sodium chloride (NS) flush 5-40 mL  5-40 mL IntraVENous Q8H    sodium chloride (NS) flush 5-40 mL  5-40 mL IntraVENous PRN     ______________________________________________________________________  EXPECTED LENGTH OF STAY: - - -  ACTUAL LENGTH OF STAY:          0                 Bonnie Saleh MD

## 2019-07-16 NOTE — H&P
HISTORY AND PHYSICAL      PCP: Gerald Alvarez NP  History source: the patient, ER/EMR      CC: shortness of breath      HPI: 36 y.o lady w/ ESRD on HD, SLE, HTN, VTE, who presented to [place] ER with dyspnea. Symptoms began 2 days ago. She developed a cough and fever followed by dyspnea. No exacerbating or alleviating factors. She denies missing HD. PMH/PSH:  Past Medical History:   Diagnosis Date    Anemia     secondary to lupus    Asthma     no inhaler use in past 2 to 3 years    Carditis     Chronic kidney disease     ESRD    Chronic pain     DDD (degenerative disc disease), lumbar     ESRD (end stage renal disease) (HCC)     GERD (gastroesophageal reflux disease)     Hemodialysis patient ([facility] 2017    [address]  Tuesday,  Thursday,  and Saturday.  Hypercholesterolemia     Hypertension     Intractable nausea and vomiting 10/21/2015    Long term (current) use of anticoagulants     Lupus (systemic lupus erythematosus) (HCC)     Malignant hypertension with chronic kidney disease stage V ([facility]     Peritoneal dialysis status ([facility] 10/2015    x 2 years Stopped 2017 due to infection and removed.  Poor historian 2018    With medications    Thromboembolus ([facility] 2013    lungs    Transfusion history     Last Transfusion 2017  at [facility]     Past Surgical History:   Procedure Laterality Date    HX  SECTION  11/2006    x1    HX OTHER SURGICAL  9/16/15    INSERTION PD CATH; Removed 2017    HX VASCULAR ACCESS Right 2017    Double-Lumen henry catheter upper chest    HX VASCULAR ACCESS Right 2018    right upper arm       Home meds:   Prior to Admission medications    Medication Sig Start Date End Date Taking? Authorizing Provider   oxyCODONE-acetaminophen (PERCOCET) 5-325 mg per tablet Take 1 Tab by mouth two (2) times daily as needed for Pain (severe back pain) for up to 30 days. Max Daily Amount: 2 Tabs.  19 Yes Kd Alla Kowalski MD   polyethylene glycol (MIRALAX) 17 gram/dose powder Put 8 capfuls of Miralax in a 32 ounce beverage and drink it, followed by 3 capfuls twice daily for the next week and follow up with your primary care physician 5/18/19  Yes Marylee Bushman, MD   bismuth subsalicylate (PEPTO-BISMOL) 262 mg/15 mL suspension Take 30 mL by mouth every four (4) hours as needed for Indigestion (diarrhea). May take 30 mL every 30 minutes as needed for up to 8 doses initially. 5/18/19  Yes Marylee Bushman, MD   amitriptyline (ELAVIL) 50 mg tablet TAKE 1 TABLET BY MOUTH EVERY NIGHT AT BEDTIME 4/17/19  Yes Cici Josue NP   minoxidil (LONITEN) 2.5 mg tablet Take 2.5 mg by mouth daily. 3/1/19  Yes Provider, Historical   losartan (COZAAR) 100 mg tablet two (2) times a day. 3/1/19  Yes Provider, Historical   hydroxychloroquine (PLAQUENIL) 200 mg tablet TAKE 2 TABLETS BY MOUTH DAILY 4/8/19  Yes Keiko Carrillo MD   ferric citrate (AURYXIA) 210 mg iron tablet Take 210 mg by mouth three (3) times daily (with meals). Yes Provider, Historical   carvedilol (COREG) 25 mg tablet Take 1 Tab by mouth two (2) times daily (with meals). 8/19/18  Yes Elmo Sin MD   amLODIPine (NORVASC) 10 mg tablet Take 1 Tab by mouth daily. 7/22/18  Yes Addy Villeda MD   predniSONE (DELTASONE) 5 mg tablet Take 2 Tabs by mouth daily. 6/28/18  Yes Haley Michel MD   gemfibrozil (LOPID) 600 mg tablet Take 300 mg by mouth daily. 11/3/17  Yes Provider, Historical   azaTHIOprine (IMURAN) 50 mg tablet Take 50 mg by mouth daily (after breakfast). 11/3/17  Yes Provider, Historical   albuterol (PROVENTIL HFA, VENTOLIN HFA, PROAIR HFA) 90 mcg/actuation inhaler Take 1-2 Puffs by inhalation every four (4) hours as needed for Wheezing or Shortness of Breath. 10/30/17  Yes Donnie Beckham NP   pantoprazole (PROTONIX) 40 mg tablet Take 1 Tab by mouth Daily (before breakfast).  10/23/15  Yes Baldomero Rios MD   ondansetron (ZOFRAN ODT) 8 mg disintegrating tablet Take 1 Tab by mouth every eight (8) hours as needed for Nausea. 11/16/18   Lydia Hewitt MD   acetaminophen (TYLENOL EXTRA STRENGTH) 500 mg tablet Take 2 Tabs by mouth every six (6) hours as needed for Pain. 11/16/18   Lydia Hewitt MD   senna (SENNA) 8.6 mg tablet Take 1 Tab by mouth daily as needed for Constipation. for constipation    Provider, Historical       Allergies:   Allergies   Allergen Reactions    Compazine [Prochlorperazine Edisylate] Nausea and Vomiting and Palpitations     \"hot and lightheaded\"       FH:  Family History   Problem Relation Age of Onset    Diabetes Father     Hypertension Father     Cancer Other         aunt with breast cancer    Diabetes Mother        SH:  Social History     Tobacco Use    Smoking status: Never Smoker    Smokeless tobacco: Never Used   Substance Use Topics    Alcohol use: No       ROS: A comprehensive review of systems was negative except for that written in the HPI. in addition to: generalized pain \"I hurt all over\"      PHYSICAL EXAM:  Visit Vitals  /69 (BP 1 Location: Left arm, BP Patient Position: At rest)   Pulse (!) 104   Temp 98.3 °F (36.8 °C)   Resp 19   Ht 5' 5\" (1.651 m)   Wt 93.8 kg (206 lb 12.7 oz)   SpO2 100%   BMI 34.41 kg/m²       Gen: NAD, non-toxic, sleeping comfortably  HEENT: anicteric sclerae, normal conjunctiva, oropharynx clear, MM moist  Neck: supple, trachea midline, no adenopathy  Heart: RRR, no MRG, no JVD, no trace b/l LE edema  Lungs: feint bibasilar crackles, non-labored respirations on O2  Abd: soft, NT, ND, BS+  Extr: warm  Skin: dry, no rash  Neuro: CN II-XII grossly intact, normal speech, moves all extremities  Psych: normal mood, appropriate affect, sleeping comfortably and easily awakened      Labs/Imaging:  Recent Results (from the past 24 hour(s))   EKG, 12 LEAD, INITIAL    Collection Time: 07/15/19  5:33 PM   Result Value Ref Range    Ventricular Rate 115 BPM    Atrial Rate 115 BPM    P-R Interval 164 ms    QRS Duration 100 ms    Q-T Interval 356 ms    QTC Calculation (Bezet) 492 ms    Calculated P Axis 36 degrees    Calculated R Axis 64 degrees    Calculated T Axis 22 degrees    Diagnosis       Sinus tachycardia  Possible Left atrial enlargement  Borderline ECG  When compared with ECG of 19-APR-2019 08:20,  Nonspecific T wave abnormality no longer evident in Lateral leads     SAMPLES BEING HELD    Collection Time: 07/15/19  5:50 PM   Result Value Ref Range    SAMPLES BEING HELD 1RED     COMMENT        Add-on orders for these samples will be processed based on acceptable specimen integrity and analyte stability, which may vary by analyte. CBC WITH AUTOMATED DIFF    Collection Time: 07/15/19  5:50 PM   Result Value Ref Range    WBC 6.8 3.6 - 11.0 K/uL    RBC 3.19 (L) 3.80 - 5.20 M/uL    HGB 9.2 (L) 11.5 - 16.0 g/dL    HCT 29.1 (L) 35.0 - 47.0 %    MCV 91.2 80.0 - 99.0 FL    MCH 28.8 26.0 - 34.0 PG    MCHC 31.6 30.0 - 36.5 g/dL    RDW 12.9 11.5 - 14.5 %    PLATELET 514 724 - 579 K/uL    MPV 10.9 8.9 - 12.9 FL    NRBC 0.0 0  WBC    ABSOLUTE NRBC 0.00 0.00 - 0.01 K/uL    NEUTROPHILS 90 (H) 32 - 75 %    LYMPHOCYTES 6 (L) 12 - 49 %    MONOCYTES 3 (L) 5 - 13 %    EOSINOPHILS 0 0 - 7 %    BASOPHILS 0 0 - 1 %    IMMATURE GRANULOCYTES 1 %    ABS. NEUTROPHILS 6.1 1.8 - 8.0 K/UL    ABS. LYMPHOCYTES 0.4 (L) 0.8 - 3.5 K/UL    ABS. MONOCYTES 0.2 0.0 - 1.0 K/UL    ABS. EOSINOPHILS 0.0 0.0 - 0.4 K/UL    ABS. BASOPHILS 0.0 0.0 - 0.1 K/UL    ABS. IMM.  GRANS. 0.1 K/UL    DF MANUAL      RBC COMMENTS NORMOCYTIC, NORMOCHROMIC     METABOLIC PANEL, COMPREHENSIVE    Collection Time: 07/15/19  5:50 PM   Result Value Ref Range    Sodium 135 (L) 136 - 145 mmol/L    Potassium 6.0 (H) 3.5 - 5.1 mmol/L    Chloride 97 97 - 108 mmol/L    CO2 24 21 - 32 mmol/L    Anion gap 14 5 - 15 mmol/L    Glucose 96 65 - 100 mg/dL    BUN 74 (H) 6 - 20 MG/DL    Creatinine 10.09 (H) 0.55 - 1.02 MG/DL    BUN/Creatinine ratio 7 (L) 12 - 20 GFR est AA 5 (L) >60 ml/min/1.73m2    GFR est non-AA 4 (L) >60 ml/min/1.73m2    Calcium 7.8 (L) 8.5 - 10.1 MG/DL    Bilirubin, total 0.3 0.2 - 1.0 MG/DL    ALT (SGPT) 12 12 - 78 U/L    AST (SGOT) 24 15 - 37 U/L    Alk.  phosphatase 75 45 - 117 U/L    Protein, total 8.0 6.4 - 8.2 g/dL    Albumin 3.1 (L) 3.5 - 5.0 g/dL    Globulin 4.9 (H) 2.0 - 4.0 g/dL    A-G Ratio 0.6 (L) 1.1 - 2.2     D DIMER    Collection Time: 07/15/19  5:50 PM   Result Value Ref Range    D-dimer 1.80 (H) 0.00 - 0.65 mg/L FEU   NT-PRO BNP    Collection Time: 07/15/19  5:50 PM   Result Value Ref Range    NT pro-BNP 16,993 (H) 0 - 125 PG/ML   TROPONIN I    Collection Time: 07/15/19  5:50 PM   Result Value Ref Range    Troponin-I, Qt. <0.05 <0.05 ng/mL   LACTIC ACID    Collection Time: 07/15/19  5:50 PM   Result Value Ref Range    Lactic acid 0.8 0.4 - 2.0 MMOL/L   POC G3 - PUL    Collection Time: 07/15/19  6:38 PM   Result Value Ref Range    pH (POC) 7.381 7.35 - 7.45      pCO2 (POC) 39.3 35.0 - 45.0 MMHG    pO2 (POC) 32 (LL) 80 - 100 MMHG    HCO3 (POC) 23.3 22 - 26 MMOL/L    sO2 (POC) 60 (L) 92 - 97 %    Base deficit (POC) 2 mmol/L    Site LEFT RADIAL      Device: NASAL CANNULA      Flow rate (POC) 4 L/M    Allens test (POC) YES      Specimen type (POC) ARTERIAL     POC G3 - PUL    Collection Time: 07/15/19  6:57 PM   Result Value Ref Range    pH (POC) 7.443 7.35 - 7.45      pCO2 (POC) 31.4 (L) 35.0 - 45.0 MMHG    pO2 (POC) 92 80 - 100 MMHG    HCO3 (POC) 21.5 (L) 22 - 26 MMOL/L    sO2 (POC) 98 (H) 92 - 97 %    Base deficit (POC) 3 mmol/L    Site LEFT RADIAL      Device: NASAL CANNULA      Flow rate (POC) 4 L/M    Allens test (POC) YES      Specimen type (POC) ARTERIAL     INFLUENZA A & B AG (RAPID TEST)    Collection Time: 07/15/19  7:47 PM   Result Value Ref Range    Influenza A Antigen NEGATIVE  NEG      Influenza B Antigen NEGATIVE  NEG         Recent Labs     07/15/19  1750   WBC 6.8   HGB 9.2*   HCT 29.1*        Recent Labs 07/15/19  1750   *   K 6.0*   CL 97   CO2 24   BUN 74*   CREA 10.09*   GLU 96   CA 7.8*     Recent Labs     07/15/19  1750   SGOT 24   ALT 12   AP 75   TBILI 0.3   TP 8.0   ALB 3.1*   GLOB 4.9*       Recent Labs     07/15/19  1750   TROIQ <0.05       No results for input(s): INR, PTP, APTT in the last 72 hours. No lab exists for component: INREXT     No results for input(s): PH, PCO2, PO2 in the last 72 hours. Xr Chest Sngl V    Result Date: 7/15/2019  IMPRESSION: Pulmonary edema and small pleural effusions    Cta Chest W Or W Wo Cont    Result Date: 7/15/2019  IMPRESSION: 1. No pulmonary emboli. 2. RUL consolidation with pneumonia difficult to exclude. 3. Bilateral airspace disease compatible with pulmonary edema. Assessment & Plan:     Acute respiratory failure with hypoxia: (POA) multifactorial due to PNA and pulm edema.  Improving    HCAP:  -continue IV vancomycin, add Zosyn  -f/u blood culture    ESRD on HD, hyperkalemia:   -HD in the AM per nephrology    SLE:  -continue home meds    HTN:  -hold home meds except carvedilol for HD in the AM    DVT ppx: sq heparin  Code status: full  Disposition: home when ready    Signed By: Reyes Gonzáles MD     July 16, 2019

## 2019-07-16 NOTE — PROGRESS NOTES
RENAL  PROGRESS NOTE        Subjective:   Feels weak  Objective:   VITALS SIGNS:    Visit Vitals  /89 (BP 1 Location: Left arm, BP Patient Position: At rest;Lying right side)   Pulse 82   Temp 98.8 °F (37.1 °C)   Resp 20   Ht 5' 5\" (1.651 m)   Wt 93.8 kg (206 lb 12.7 oz)   SpO2 99%   BMI 34.41 kg/m²       O2 Device: Nasal cannula   O2 Flow Rate (L/min): 2 l/min   Temp (24hrs), Av.6 °F (37.6 °C), Min:98.3 °F (36.8 °C), Max:102.7 °F (39.3 °C)         PHYSICAL EXAM:    Check av access  NAD  DATA REVIEW:     INTAKE / OUTPUT:   Last shift:      No intake/output data recorded.   Last 3 shifts:  190 -  0700  In: 50 [I.V.:50]  Out: -     Intake/Output Summary (Last 24 hours) at 2019 1130  Last data filed at 7/15/2019 2242  Gross per 24 hour   Intake 50 ml   Output --   Net 50 ml         LABS:   Recent Labs     19  0611 07/15/19  1750   WBC 7.1 6.8   HGB 7.8* 9.2*   HCT 24.9* 29.1*   * 157     Recent Labs     19  0611 07/15/19  1750   * 135*   K 5.2* 6.0*   CL 99 97   CO2 22 24   GLU 95 96   BUN 82* 74*   CREA 11.10* 10.09*   CA 7.9* 7.8*   MG 2.5*  --    PHOS 8.0*  --    ALB  --  3.1*   TBILI  --  0.3   SGOT  --  24   ALT  --  12           Assessment:     ESRD pt with SOB and Fever  [facility] unit; TTS  hyperkalemia  CT with RUL PNA   Plan:   AB   HD today  Discussed with her  Nola Jones MD

## 2019-07-16 NOTE — CONSULTS
ESRD pt with SOB and Fever  [facility] unit; TTS  K is 6  CT with RUL PNA and mild pulm edema  O2 Sat 96% on RA    S/p medical Rx of K in ED  BCx  Empiric ABx    Will plan HD 1st thing in AM  Called [facility] and notified  HD orders written    D/W Dr. Laurice Osler and Jes Tapia nurse on call    Pastora Barney MD  [id number]

## 2019-07-16 NOTE — PROGRESS NOTES
Day #1 of Vancomycin  Indication:  HAP  Current regimen:  2 gram once then dose after dialysis  Abx regimen:  Zosyn and Vancomycin  ID Following ?: NO  Inpatient dialysis schedule: Tuesday/Thursday/Saturday    Recent Labs     07/15/19  1750   WBC 6.8   CREA 10.09*   BUN 74*     Est CrCl: ESRD on HD  Temp (24hrs), Av.3 °F (37.9 °C), Min:98.3 °F (36.8 °C), Max:102.7 °F (39.3 °C)    Cultures: blood      Goal trough = 20 - 25 mcg/mL for therapeutic goal of 15 - 20 mcg/mL (assuming ~35% removal by dialysis)    Date                Dialysis (Yes/No)       Pre-HD Level              Dose  7/15  no       Reminder staff message entered (if needed): NO    Plan: Continue current regimen

## 2019-07-16 NOTE — PROGRESS NOTES
Bedside and Verbal shift change report given to Shawna Lee RN (oncoming nurse) by Emma Stubbs RN (offgoing nurse). Report included the following information SBAR, Kardex, Intake/Output, MAR and Recent Results.

## 2019-07-16 NOTE — CDMP QUERY
#2 
 
Pt admitted with respiratory failure/Pt noted to have elevated BNP and pulmonary edema Casey Challenger If possible, please document in the progress notes and d/c summary if you are evaluating and / or treating any of the following: 
 
Acute Systolic CHF Acute Diastolic CHF Non cardiogenic pulmonary edema Other, please specify Clinically unable to determine The medical record reflects the following: 
  Risk Factors: HTN, Clinical Indicators: NT pro-BNP: 16,993 (H)* 
 
CTA- 
IMPRESSION:  
1. No pulmonary emboli. 2. RUL consolidation with pneumonia difficult to exclude. 3. Bilateral airspace disease compatible with pulmonary edema Treatment: Norvasc po, coreg po Thank you, 
       Moises Martin [facility], [address] [facility]

## 2019-07-16 NOTE — PROGRESS NOTES
CM attempted to meet with patient this afternoon. Patient undergoing hemodialysis and asleep. CM will attempt to meet with patient in AM.  Patient has HD [facility], Saturday.     Devendra Haley, MPH

## 2019-07-16 NOTE — ED NOTES
TRANSFER - OUT REPORT:    Verbal report given to Evelyn Martinez) on Angella Jimenez  being transferred to CVSU(unit) for routine progression of care       Report consisted of patients Situation, Background, Assessment and   Recommendations(SBAR). Information from the following report(s) SBAR, Kardex and ED Summary was reviewed with the receiving nurse. Lines:   Peripheral IV 07/15/19 Left Forearm (Active)   Site Assessment Clean, dry, & intact 7/15/2019  7:35 PM   Phlebitis Assessment 0 7/15/2019  7:35 PM   Infiltration Assessment 0 7/15/2019  7:35 PM   Dressing Status Clean, dry, & intact 7/15/2019  7:35 PM        Opportunity for questions and clarification was provided.       Patient transported with:   Monitor  O2 @ 2 liters

## 2019-07-16 NOTE — CDMP QUERY
#1 
 
Pt admitted with respiratory failure /Pt noted to have pneumonia documented as HCAP If possible, please document in the progress notes and d/c summary if you are evaluating and / or treating any of the following: 
 
? Gram negative pneumonia ? Gram positive pneumonia ? Bacterial pneumonia ? Aspiration pneumonia 
? Other (please specify) ? Unknown The medical record reflects the following: 
 Risk Factors: N/V, ESRD, Pneumonia Clinical Indicators: N/V, fever CTA- 
IMPRESSION:  
1. No pulmonary emboli. 2. RUL consolidation with pneumonia difficult to exclude. 3. Bilateral airspace disease compatible with pulmonary edema 
 
102.7 °F (39.3 °C) 113 27 166/107 Treatment: vancomycin IV, zosyn IV Thank you, 
       Layla Balderas [facility], [address] [facility]

## 2019-07-17 LAB
ALBUMIN SERPL-MCNC: 2.5 G/DL (ref 3.5–5)
ALBUMIN/GLOB SERPL: 0.6 {RATIO} (ref 1.1–2.2)
ALP SERPL-CCNC: 63 U/L (ref 45–117)
ALT SERPL-CCNC: 9 U/L (ref 12–78)
ANION GAP SERPL CALC-SCNC: 9 MMOL/L (ref 5–15)
AST SERPL-CCNC: 17 U/L (ref 15–37)
ATRIAL RATE: 115 BPM
BILIRUB SERPL-MCNC: 0.3 MG/DL (ref 0.2–1)
BUN SERPL-MCNC: 48 MG/DL (ref 6–20)
BUN/CREAT SERPL: 6 (ref 12–20)
CALCIUM SERPL-MCNC: 8 MG/DL (ref 8.5–10.1)
CALCULATED P AXIS, ECG09: 36 DEGREES
CALCULATED R AXIS, ECG10: 64 DEGREES
CALCULATED T AXIS, ECG11: 22 DEGREES
CHLORIDE SERPL-SCNC: 103 MMOL/L (ref 97–108)
CO2 SERPL-SCNC: 25 MMOL/L (ref 21–32)
CREAT SERPL-MCNC: 7.63 MG/DL (ref 0.55–1.02)
DIAGNOSIS, 93000: NORMAL
ERYTHROCYTE [DISTWIDTH] IN BLOOD BY AUTOMATED COUNT: 12.8 % (ref 11.5–14.5)
GLOBULIN SER CALC-MCNC: 4.2 G/DL (ref 2–4)
GLUCOSE SERPL-MCNC: 94 MG/DL (ref 65–100)
HCT VFR BLD AUTO: 24.7 % (ref 35–47)
HGB BLD-MCNC: 7.5 G/DL (ref 11.5–16)
MCH RBC QN AUTO: 28.7 PG (ref 26–34)
MCHC RBC AUTO-ENTMCNC: 30.4 G/DL (ref 30–36.5)
MCV RBC AUTO: 94.6 FL (ref 80–99)
NRBC # BLD: 0 K/UL (ref 0–0.01)
NRBC BLD-RTO: 0 PER 100 WBC
P-R INTERVAL, ECG05: 164 MS
PLATELET # BLD AUTO: 136 K/UL (ref 150–400)
PMV BLD AUTO: 10.7 FL (ref 8.9–12.9)
POTASSIUM SERPL-SCNC: 4.6 MMOL/L (ref 3.5–5.1)
PROT SERPL-MCNC: 6.7 G/DL (ref 6.4–8.2)
Q-T INTERVAL, ECG07: 356 MS
QRS DURATION, ECG06: 100 MS
QTC CALCULATION (BEZET), ECG08: 492 MS
RBC # BLD AUTO: 2.61 M/UL (ref 3.8–5.2)
SODIUM SERPL-SCNC: 137 MMOL/L (ref 136–145)
VENTRICULAR RATE, ECG03: 115 BPM
WBC # BLD AUTO: 6.6 K/UL (ref 3.6–11)

## 2019-07-17 PROCEDURE — 65270000029 HC RM PRIVATE

## 2019-07-17 PROCEDURE — 74011250636 HC RX REV CODE- 250/636: Performed by: HOSPITALIST

## 2019-07-17 PROCEDURE — 36415 COLL VENOUS BLD VENIPUNCTURE: CPT

## 2019-07-17 PROCEDURE — 74011250637 HC RX REV CODE- 250/637: Performed by: HOSPITALIST

## 2019-07-17 PROCEDURE — 74011000258 HC RX REV CODE- 258: Performed by: HOSPITALIST

## 2019-07-17 PROCEDURE — 74011250637 HC RX REV CODE- 250/637: Performed by: NURSE PRACTITIONER

## 2019-07-17 PROCEDURE — 74011636637 HC RX REV CODE- 636/637: Performed by: HOSPITALIST

## 2019-07-17 PROCEDURE — 80053 COMPREHEN METABOLIC PANEL: CPT

## 2019-07-17 PROCEDURE — 85027 COMPLETE CBC AUTOMATED: CPT

## 2019-07-17 PROCEDURE — 77030027138 HC INCENT SPIROMETER -A

## 2019-07-17 RX ORDER — AMITRIPTYLINE HYDROCHLORIDE 50 MG/1
50 TABLET, FILM COATED ORAL
Status: DISCONTINUED | OUTPATIENT
Start: 2019-07-17 | End: 2019-07-18 | Stop reason: HOSPADM

## 2019-07-17 RX ADMIN — PANTOPRAZOLE SODIUM 40 MG: 40 TABLET, DELAYED RELEASE ORAL at 08:20

## 2019-07-17 RX ADMIN — OXYCODONE HYDROCHLORIDE 5 MG: 5 TABLET ORAL at 16:24

## 2019-07-17 RX ADMIN — Medication 10 ML: at 14:36

## 2019-07-17 RX ADMIN — CARVEDILOL 25 MG: 12.5 TABLET, FILM COATED ORAL at 17:16

## 2019-07-17 RX ADMIN — HYDROXYCHLOROQUINE SULFATE 400 MG: 200 TABLET, FILM COATED ORAL at 09:25

## 2019-07-17 RX ADMIN — OXYCODONE HYDROCHLORIDE 5 MG: 5 TABLET ORAL at 20:34

## 2019-07-17 RX ADMIN — HEPARIN SODIUM 5000 UNITS: 5000 INJECTION INTRAVENOUS; SUBCUTANEOUS at 09:29

## 2019-07-17 RX ADMIN — AZATHIOPRINE 50 MG: 50 TABLET ORAL at 09:25

## 2019-07-17 RX ADMIN — OXYCODONE HYDROCHLORIDE 5 MG: 5 TABLET ORAL at 09:28

## 2019-07-17 RX ADMIN — Medication 10 ML: at 21:28

## 2019-07-17 RX ADMIN — PREDNISONE 10 MG: 10 TABLET ORAL at 09:25

## 2019-07-17 RX ADMIN — HEPARIN SODIUM 5000 UNITS: 5000 INJECTION INTRAVENOUS; SUBCUTANEOUS at 03:00

## 2019-07-17 RX ADMIN — CARVEDILOL 25 MG: 12.5 TABLET, FILM COATED ORAL at 09:25

## 2019-07-17 RX ADMIN — PIPERACILLIN SODIUM,TAZOBACTAM SODIUM 3.38 G: 3; .375 INJECTION, POWDER, FOR SOLUTION INTRAVENOUS at 05:42

## 2019-07-17 RX ADMIN — AMITRIPTYLINE HYDROCHLORIDE 50 MG: 50 TABLET, FILM COATED ORAL at 21:28

## 2019-07-17 RX ADMIN — Medication 10 ML: at 05:47

## 2019-07-17 RX ADMIN — HEPARIN SODIUM 5000 UNITS: 5000 INJECTION INTRAVENOUS; SUBCUTANEOUS at 17:16

## 2019-07-17 RX ADMIN — PIPERACILLIN SODIUM,TAZOBACTAM SODIUM 3.38 G: 3; .375 INJECTION, POWDER, FOR SOLUTION INTRAVENOUS at 17:24

## 2019-07-17 NOTE — PROGRESS NOTES
1955: Report received from [address]     9969: Bedside and Verbal shift change report given to Duane Mobley RN (oncoming nurse) by Alix Gonzales RN (offgoing nurse). Report included the following information SBAR, Kardex, Intake/Output, MAR, Recent Results, Med Rec Status and Cardiac Rhythm NSR.

## 2019-07-17 NOTE — PROGRESS NOTES
Hospitalist Progress Note  Bruce Jennings MD  Answering service: [phone] from in house phone  Cell: [phone]      Date of Service:  2019  NAME:  Roberto Sloan  :  1985  MRN:  [id number]      Admission Summary:     A 36years old lady with ESRD on HD, SLE, HTN, VTE, who presented to [place] ER with dyspnea. Symptoms began 2 days ago. She developed a cough and fever followed by dyspnea. No exacerbating or alleviating factors. She denies missing HD. Interval history / Subjective:     She said she doesn't feel good today, pain all over and on and off cough, no left side chest pain or shortness of breath     Assessment & Plan:     Acute hypoxic respiratory failure due to pneumonia and pulmonary edema  -continue HD, IV zosyn and vancomycin, oxygen support, prn duo neb   -CTA chest no PE, RUL consolidation with pneumonia difficult to exclude, bilateral airspace disease compatible with pulmonary edema  -chest x ra on 7/15 pulmonary edema and small pleural effusions  -SpO2 95-98% on RA    HCAP   -continue IV zosyn and vancomycin , off oxygen support, monitor pulse ox  -Last fever was on 7/15, Tmax was 102.7 on 7/15, no leukocytosis  -blood cx no growth so far, discontinue vancomycin     ESRD DD   -HD per nephrologist     Anemia of chronic disease   -H/H low but stable, no evidence of bleeding  -monitor H/H and transfuse for Hgb <7.0    HTN  -BP improving and normal, continue coreg, monitoring BP    Chronic SLE ??  Acute flare   -continue azathioprine, plaquinil and prednisone   -CRP and ESR elevated    Mild hyponatremia and hyperkalemia   -resolved    Acute thrombocytopenia possible due to infection   -, repeat cbc in am    Code status: Full Code  DVT prophylaxis: heparin     Care Plan discussed with: Patient/Family and Nurse  Disposition: TBD, transfer to medical floor      Hospital Problems  Date Reviewed: 4/23/2019          Codes Class Noted POA    HCAP (healthcare-associated pneumonia) ICD-10-CM: J18.9  ICD-9-CM: 238  7/16/2019 Unknown        Hyperkalemia ICD-10-CM: E87.5  ICD-9-CM: 276.7  3/4/2019 Unknown              Vital Signs:    Last 24hrs VS reviewed since prior progress note. Most recent are:  Visit Vitals  /90 (BP 1 Location: Left arm, BP Patient Position: At rest)   Pulse 87   Temp 98.4 °F (36.9 °C)   Resp 18   Ht 5' 5\" (1.651 m)   Wt 92.8 kg (204 lb 9.4 oz)   SpO2 98%   BMI 34.05 kg/m²         Intake/Output Summary (Last 24 hours) at 7/17/2019 1039  Last data filed at 7/16/2019 1907  Gross per 24 hour   Intake 450 ml   Output 2600 ml   Net -2150 ml        Physical Examination:             Constitutional:  No acute distress, cooperative, pleasant    ENT:  Oral mucous moist, oropharynx benign. Neck supple,    Resp:  Decrease bronchial breath sound bilaterally. No wheezing/rhonchi/rales. No accessory muscle use   CV:  Regular rhythm, normal rate, no murmurs, gallops, rubs    GI:  Soft, non distended, non tender. normoactive bowel sounds, no hepatosplenomegaly     Musculoskeletal:  No edema    Neurologic:  Moves all extremities.   AAOx3, CN II-XII reviewed     Skin:  Good turgor, no rashes or ulcers       Data Review:    Review and/or order of clinical lab test  Review and/or order of tests in the radiology section of CPT  Review and/or order of tests in the medicine section of CPT      Labs:     Recent Labs     07/17/19  0344 07/16/19  0611   WBC 6.6 7.1   HGB 7.5* 7.8*   HCT 24.7* 24.9*   * 118*     Recent Labs     07/17/19  0344 07/16/19  0611 07/15/19  1750    132* 135*   K 4.6 5.2* 6.0*    99 97   CO2 25 22 24   BUN 48* 82* 74*   CREA 7.63* 11.10* 10.09*   GLU 94 95 96   CA 8.0* 7.9* 7.8*   MG  --  2.5*  --    PHOS  --  8.0*  --      Recent Labs     07/17/19  0344 07/15/19  1750   SGOT 17 24   ALT 9* 12   AP 63 75   TBILI 0.3 0.3   TP 6.7 8.0   ALB 2.5* 3.1*   GLOB 4.2* 4.9*     No results for input(s): INR, PTP, APTT in the last 72 hours. No lab exists for component: INREXT, INREXT   No results for input(s): FE, TIBC, PSAT, FERR in the last 72 hours. Lab Results   Component Value Date/Time    Folate 13.7 06/17/2018 03:00 PM      No results for input(s): PH, PCO2, PO2 in the last 72 hours.   Recent Labs     07/15/19  1750   TROIQ <0.05     Lab Results   Component Value Date/Time    Cholesterol, total 140 09/11/2018 06:17 AM    HDL Cholesterol 43 09/11/2018 06:17 AM    LDL, calculated 60.4 09/11/2018 06:17 AM    Triglyceride 183 (H) 09/11/2018 06:17 AM    CHOL/HDL Ratio 3.3 09/11/2018 06:17 AM     Lab Results   Component Value Date/Time    Glucose (POC) 90 04/19/2019 06:30 AM    Glucose (POC) 84 03/05/2019 08:11 AM    Glucose (POC) 104 (H) 08/16/2018 11:06 AM    Glucose (POC) 164 (H) 07/22/2018 11:43 AM    Glucose (POC) 138 (H) 07/21/2018 09:22 PM     Lab Results   Component Value Date/Time    Color YELLOW/STRAW 03/31/2019 06:07 PM    Appearance CLEAR 03/31/2019 06:07 PM    Specific gravity 1.015 03/31/2019 06:07 PM    Specific gravity 1.017 08/12/2016 09:06 PM    pH (UA) 8.5 (H) 03/31/2019 06:07 PM    Protein 100 (A) 03/31/2019 06:07 PM    Glucose NEGATIVE  03/31/2019 06:07 PM    Ketone NEGATIVE  03/31/2019 06:07 PM    Bilirubin NEGATIVE  03/31/2019 06:07 PM    Urobilinogen 0.2 03/31/2019 06:07 PM    Nitrites NEGATIVE  03/31/2019 06:07 PM    Leukocyte Esterase NEGATIVE  03/31/2019 06:07 PM    Epithelial cells MODERATE (A) 03/31/2019 06:07 PM    Bacteria NEGATIVE  03/31/2019 06:07 PM    WBC 0-4 03/31/2019 06:07 PM    RBC 0-5 03/31/2019 06:07 PM         Medications Reviewed:     Current Facility-Administered Medications   Medication Dose Route Frequency    [START ON 7/18/2019] epoetin pia-epbx (RETACRIT) injection 10,000 Units  10,000 Units SubCUTAneous Q TUE, THU & SAT    piperacillin-tazobactam (ZOSYN) 3.375 g in 0.9% sodium chloride (MBP/ADV) 100 mL  3.375 g IntraVENous Q12H    carvedilol (COREG) tablet 25 mg  25 mg Oral BID WITH MEALS    hydroxychloroquine (PLAQUENIL) tablet 400 mg  400 mg Oral DAILY WITH BREAKFAST    pantoprazole (PROTONIX) tablet 40 mg  40 mg Oral ACB    predniSONE (DELTASONE) tablet 10 mg  10 mg Oral DAILY    acetaminophen (TYLENOL) tablet 650 mg  650 mg Oral Q6H PRN    ondansetron (ZOFRAN) injection 4 mg  4 mg IntraVENous Q4H PRN    azaTHIOprine (IMURAN) tablet 50 mg  50 mg Oral DAILY AFTER BREAKFAST    heparin (porcine) injection 5,000 Units  5,000 Units SubCUTAneous Q8H    Vancomycin Pharmacy Dosing - Dialysis pt   Other Rx Dosing/Monitoring    oxyCODONE IR (ROXICODONE) tablet 5 mg  5 mg Oral Q6H PRN    sodium chloride (NS) flush 5-40 mL  5-40 mL IntraVENous Q8H    sodium chloride (NS) flush 5-40 mL  5-40 mL IntraVENous PRN     ______________________________________________________________________  EXPECTED LENGTH OF STAY: 3d 19h  ACTUAL LENGTH OF STAY:          1                 Jeremias Roca MD

## 2019-07-17 NOTE — PROGRESS NOTES
1050hrs:  RN received phone call from Dr. Luis Fernando Chapin with patient plan. Patient to remain in hospital one more night with labs in a.m. To be discharged tomorrow pending results. 1110hrs: Followed up with CM. Patient should receive dialysis prior to discharge tomorrow. Per CM, patient drowsy and fell asleep during conversation; RN to assess. Patient received pain medication this am.  Upon assessment RN noted that patient was easily arousable and conversational but would fall asleep quickly. Patient stated her pain was \"all over a 6/10\" but then fell back asleep and was snoring. Vitals stable:  HR 76 NSR, Resps 18, SpO2 96% on room air. Will continue to monitor.

## 2019-07-17 NOTE — PROGRESS NOTES
Spiritual Care Partner Volunteer visited patient in Rm 451 on 7/17/19. Documented by:   Chaplain Watson MDiv, MACE  [phone]

## 2019-07-17 NOTE — PROGRESS NOTES
Discharge Plan:  Discharge at approximately 10:00am on 7/18/19 transportation through [place] transportation services directly to [employer] for 11:15 dialysis chair time. CM spoke with patient regarding dialysis and discharge plan. Patient states she will need a ride to dialysis. Patient is agreeable to have medicaid transport services assist her to dialysis. [facility] Dialysis chair time is 11:15.    1415 - CM called [employer] (p: [phone]) to schedule pickup time. Transportation confirmed for 10:00am pickup at ED Entrance for [facility] (ref# [id number]). 1425 - CM faxed latest dialysis notes to [employer] (f: [phone]).     Devendra Haley, MPH

## 2019-07-17 NOTE — PROGRESS NOTES
Reason for Admission:   Hyperkalemia               RRAT Score:     23             Resources/supports as identified by patient/family:   Family support. Top Challenges facing patient (as identified by patient/family and CM):  CM identifies patient medical complexity as top challenge. Finances/Medication cost?      Patient states no challenges. Medicare A & B and Medicaid of [place]. Transportation? Patient states she drives. Support system or lack thereof? Family support. Living arrangements? Patient states she lives with her parents and child. Self-care/ADLs/Cognition? Patient states she is independent without need for medical equipment for self-care and ADLs. Patient was drowsy, but oriented during assessment. Current Advanced Directive/Advance Care Plan:  Not on file. Plan for utilizing home health:    Patient agreeable for [facility] - Pneumonia. Transition of Care Plan:       CM met with patient at bedside to introduce self, discuss role, and discharge planning. Patient was drowsy during assessment but able to answer questions. Patient states the following regarding baseline assessment:    1. Patient ADLs, self-care, orientation baseline - Patient independent with ADLs and self-care. Patient drives at baseline. Patient on room air. Patient was drowsy but oriented during assessment. 2.  Dialysis - Patient goes to [facility] for Dialysis. Patient's outpatient nephrologist is Dr. Lexa Feldman. Patient's dialysis is Tuesday, Thursday, and Saturday. Chair time varies between 1st and 2nd shift. 3. Discharge Plan - Home with [facility] Pneumonia. CM to continue to follow. Nasir Kaur, MPH    Care Management Interventions  PCP Verified by CM: Yes  Mode of Transport at Discharge:  Other (see comment)(Father, private car)  Transition of Care Consult (CM Consult): Discharge Planning  MyChart Signup: No  Discharge Durable Medical Equipment: No  Health Maintenance Reviewed: Yes  Physical Therapy Consult: No  Occupational Therapy Consult: No  Speech Therapy Consult: No  Current Support Network: Relative's Home  Confirm Follow Up Transport: Family  Plan discussed with Pt/Family/Caregiver: Yes  Discharge Location  Discharge Placement: Home

## 2019-07-17 NOTE — PROGRESS NOTES
Bedside shift change report given to Seven Stoner (oncoming nurse) by Esthela Hoffman RN (offgoing nurse).  Report included the following information SBAR, Kardex, ED Summary, OR Summary, Procedure Summary, Intake/Output, MAR, Accordion, Recent Results and Cardiac Rhythm SR.

## 2019-07-17 NOTE — PROGRESS NOTES
RENAL  PROGRESS NOTE        Subjective:   Feels better  Objective:   VITALS SIGNS:    Visit Vitals  /90 (BP 1 Location: Left arm, BP Patient Position: At rest)   Pulse 87   Temp 98.4 °F (36.9 °C)   Resp 18   Ht 5' 5\" (1.651 m)   Wt 92.8 kg (204 lb 9.4 oz)   SpO2 98%   BMI 34.05 kg/m²       O2 Device: Room air   O2 Flow Rate (L/min): 2 l/min   Temp (24hrs), Av.3 °F (36.8 °C), Min:97.6 °F (36.4 °C), Max:98.8 °F (37.1 °C)         PHYSICAL EXAM:    Check av access  NAD  DATA REVIEW:     INTAKE / OUTPUT:   Last shift:      No intake/output data recorded.   Last 3 shifts: 07/15 1901 -  0700  In: 500 [I.V.:500]  Out: 2600     Intake/Output Summary (Last 24 hours) at 2019 0956  Last data filed at 2019 1907  Gross per 24 hour   Intake 450 ml   Output 2600 ml   Net -2150 ml         LABS:   Recent Labs     19  0344 19  0611 07/15/19  1750   WBC 6.6 7.1 6.8   HGB 7.5* 7.8* 9.2*   HCT 24.7* 24.9* 29.1*   * 118* 157     Recent Labs     19  0344 19  0611 07/15/19  1750    132* 135*   K 4.6 5.2* 6.0*    99 97   CO2 25 22 24   GLU 94 95 96   BUN 48* 82* 74*   CREA 7.63* 11.10* 10.09*   CA 8.0* 7.9* 7.8*   MG  --  2.5*  --    PHOS  --  8.0*  --    ALB 2.5*  --  3.1*   TBILI 0.3  --  0.3   SGOT 17  --  24   ALT 9*  --  12           Assessment:     ESRD pt with SOB and Fever  [facility] [place] unit; TTS  hyperkalemia  CT with RUL PNA   Plan:   AB   HD tomorrow  DEANDRA  Discussed with her  Gorge Goyal MD

## 2019-07-18 ENCOUNTER — HOME HEALTH ADMISSION (OUTPATIENT)
Dept: HOME HEALTH SERVICES | Facility: HOME HEALTH | Age: 34
End: 2019-07-18

## 2019-07-18 VITALS
BODY MASS INDEX: 34.09 KG/M2 | SYSTOLIC BLOOD PRESSURE: 140 MMHG | DIASTOLIC BLOOD PRESSURE: 90 MMHG | TEMPERATURE: 98.7 F | RESPIRATION RATE: 18 BRPM | HEIGHT: 65 IN | OXYGEN SATURATION: 97 % | HEART RATE: 88 BPM | WEIGHT: 204.59 LBS

## 2019-07-18 LAB
ERYTHROCYTE [DISTWIDTH] IN BLOOD BY AUTOMATED COUNT: 12.8 % (ref 11.5–14.5)
HCT VFR BLD AUTO: 23.8 % (ref 35–47)
HGB BLD-MCNC: 7.3 G/DL (ref 11.5–16)
MCH RBC QN AUTO: 29.1 PG (ref 26–34)
MCHC RBC AUTO-ENTMCNC: 30.7 G/DL (ref 30–36.5)
MCV RBC AUTO: 94.8 FL (ref 80–99)
NRBC # BLD: 0 K/UL (ref 0–0.01)
NRBC BLD-RTO: 0 PER 100 WBC
PLATELET # BLD AUTO: 145 K/UL (ref 150–400)
PMV BLD AUTO: 10.8 FL (ref 8.9–12.9)
RBC # BLD AUTO: 2.51 M/UL (ref 3.8–5.2)
WBC # BLD AUTO: 6 K/UL (ref 3.6–11)

## 2019-07-18 PROCEDURE — 36415 COLL VENOUS BLD VENIPUNCTURE: CPT

## 2019-07-18 PROCEDURE — 85027 COMPLETE CBC AUTOMATED: CPT

## 2019-07-18 PROCEDURE — 74011250637 HC RX REV CODE- 250/637: Performed by: HOSPITALIST

## 2019-07-18 PROCEDURE — 74011636637 HC RX REV CODE- 636/637: Performed by: HOSPITALIST

## 2019-07-18 PROCEDURE — 74011250636 HC RX REV CODE- 250/636: Performed by: INTERNAL MEDICINE

## 2019-07-18 PROCEDURE — 74011250636 HC RX REV CODE- 250/636: Performed by: HOSPITALIST

## 2019-07-18 PROCEDURE — 74011000258 HC RX REV CODE- 258: Performed by: HOSPITALIST

## 2019-07-18 PROCEDURE — 90935 HEMODIALYSIS ONE EVALUATION: CPT

## 2019-07-18 RX ORDER — AMOXICILLIN AND CLAVULANATE POTASSIUM 500; 125 MG/1; MG/1
1 TABLET, FILM COATED ORAL EVERY 12 HOURS
Qty: 10 TAB | Refills: 0 | Status: SHIPPED | OUTPATIENT
Start: 2019-07-18 | End: 2019-07-23

## 2019-07-18 RX ORDER — OXYCODONE HYDROCHLORIDE 5 MG/1
5 TABLET ORAL
Qty: 4 TAB | Refills: 0 | Status: SHIPPED | OUTPATIENT
Start: 2019-07-18 | End: 2019-07-21

## 2019-07-18 RX ADMIN — OXYCODONE HYDROCHLORIDE 5 MG: 5 TABLET ORAL at 02:52

## 2019-07-18 RX ADMIN — Medication 10 ML: at 13:23

## 2019-07-18 RX ADMIN — PIPERACILLIN SODIUM,TAZOBACTAM SODIUM 3.38 G: 3; .375 INJECTION, POWDER, FOR SOLUTION INTRAVENOUS at 06:04

## 2019-07-18 RX ADMIN — Medication 10 ML: at 06:07

## 2019-07-18 RX ADMIN — PANTOPRAZOLE SODIUM 40 MG: 40 TABLET, DELAYED RELEASE ORAL at 07:21

## 2019-07-18 RX ADMIN — PREDNISONE 10 MG: 10 TABLET ORAL at 13:14

## 2019-07-18 RX ADMIN — EPOETIN ALFA-EPBX 10000 UNITS: 10000 INJECTION, SOLUTION INTRAVENOUS; SUBCUTANEOUS at 15:00

## 2019-07-18 RX ADMIN — HEPARIN SODIUM 5000 UNITS: 5000 INJECTION INTRAVENOUS; SUBCUTANEOUS at 01:35

## 2019-07-18 RX ADMIN — CARVEDILOL 25 MG: 12.5 TABLET, FILM COATED ORAL at 07:21

## 2019-07-18 RX ADMIN — HYDROXYCHLOROQUINE SULFATE 400 MG: 200 TABLET, FILM COATED ORAL at 07:21

## 2019-07-18 RX ADMIN — HEPARIN SODIUM 5000 UNITS: 5000 INJECTION INTRAVENOUS; SUBCUTANEOUS at 13:22

## 2019-07-18 RX ADMIN — OXYCODONE HYDROCHLORIDE 5 MG: 5 TABLET ORAL at 13:14

## 2019-07-18 RX ADMIN — AZATHIOPRINE 50 MG: 50 TABLET ORAL at 13:59

## 2019-07-18 NOTE — PROGRESS NOTES
Hospital follow-up PCP transitional care appointment has been scheduled with Yair Bernard NP for Friday, 7/26/19 at 2:15 p.m. Pending patient discharge.   Granger Rash, Care Management Specialist.

## 2019-07-18 NOTE — PROCEDURES
[facility]  [phone]    Vitals   Pre   Post   Assessment   Pre   Post     Temp  Temp: 98.6 °F (37 °C) (07/18/19 0830)  98.6 LOC  A&Ox4 A&Ox4   HR   Pulse (Heart Rate): 79 (07/18/19 0830) 82 Lungs   Clear   RA   B/P   BP: (!) 141/99 (07/18/19 0830) 143/89 Cardiac   RRR RRR   Resp   Resp Rate: 18 (07/18/19 0830) 18 Skin   Warm and dry Warm and dry   Pain level  Pain Intensity 1: 5 (07/18/19 0252) 0 Edema  None     None   Orders:    Duration:   Start:    08:30 End:    12:00 Total:   3.5 hr   Dialyzer:   Dialyzer/Set Up Inspection: Reginoar (07/18/19 0830)   K Bath:   Dialysate K (mEq/L): 2 (07/16/19 1320)   Ca Bath:   Dialysate CA (mEq/L): 2.5 (07/16/19 1320)   Na/Bicarb:   Dialysate NA (mEq/L): 140 (07/16/19 1320)   Target Fluid Removal:   Goal/Amount of Fluid to Remove (mL): 3000 mL (07/18/19 0830)   Access     Type & Location:   PETER AVF   Labs     Obtained/Reviewed   Critical Results Called   Date when labs were drawn-  Hgb-    HGB   Date Value Ref Range Status   07/18/2019 7.3 (L) 11.5 - 16.0 g/dL Final     K-    Potassium   Date Value Ref Range Status   07/17/2019 4.6 3.5 - 5.1 mmol/L Final     Ca-   Calcium   Date Value Ref Range Status   07/17/2019 8.0 (L) 8.5 - 10.1 MG/DL Final     Bun-   BUN   Date Value Ref Range Status   07/17/2019 48 (H) 6 - 20 MG/DL Final     Comment:     INVESTIGATED PER DELTA CHECK PROTOCOL     Creat-   Creatinine   Date Value Ref Range Status   07/17/2019 7.63 (H) 0.55 - 1.02 MG/DL Final     Comment:     INVESTIGATED PER DELTA CHECK PROTOCOL      Medications/ Blood Products Given     Name   Dose   Route and Time     None given                Blood Volume Processed (BVP):    75.7 L Net Fluid   Removed:  3000 mL   Comments   Time Out Done: 08:15  Primary Nurse Rpt Javi Lee RN  Primary Nurse Rpt Post:  Pt Education: Access care  Care Plan: HD today, potential discharge this afternoon  Tx Summary:  0815: Safety checks complete.  Timeout performed. 0830: PETER AVF assessed, no redness, warmth or drainage, skin intact. Skin prepped using alcohol wiped followed by alcohol x 60 sec each site. Cannulated with 15 g needles each site and secured in place. HD initiated. 1055: Venous pressures >260, BFR set to 350 ml/min to achieve safe .  1200: HD treatment complete. All possible blood rinsed back. De-cannulated and pressure held until hemostasis achieved. Patient tolerated HD treatment well. VSS. Report called to primary RN  [phone]: Patient transported back to room via transport.      Admitting Diagnosis: Dyspnea  Pt's previous clinic: [facility]  Informed Consent Verified: Yes (07/18/19 0830)  Crystalita Consent: Verified  Hepatitis Status: HBsAg: negative (07.02.2019) HBsAb: 18/immune (07.02.2019) obtained from clinic   Machine Number: [id number] (07/18/19 0830)

## 2019-07-18 NOTE — DISCHARGE SUMMARY
Discharge Summary       PATIENT ID: Cole Keith  MRN: [id number]   YOB: 1985    DATE OF ADMISSION: 7/15/2019  5:27 PM    DATE OF DISCHARGE: 7/18/2019   PRIMARY CARE PROVIDER: Kaylynn Thomas NP     ATTENDING PHYSICIAN: Iqra Payton MD  DISCHARGING PROVIDER: Uziel Grajeda MD    To contact this individual call [phone] and ask the  to page. If unavailable ask to be transferred the Adult Hospitalist Department. CONSULTATIONS: IP CONSULT TO NEPHROLOGY  IP CONSULT TO HOSPITALIST    PROCEDURES/SURGERIES: * No surgery found *    ADMITTING DIAGNOSES & HOSPITAL COURSE:        A 36years old lady with ESRD on HD, SLE, HTN, VTE, who presented to [place] ER with dyspnea. Symptoms began 2 days ago. She developed a cough and fever followed by dyspnea. No exacerbating or alleviating factors.  She denies missing HD.     Acute hypoxic respiratory failure due to pneumonia and pulmonary edema  -continue HD, IV zosyn and vancomycin, oxygen support, prn duo neb   -CTA chest no PE, RUL consolidation with pneumonia difficult to exclude, bilateral airspace disease compatible with pulmonary edema  -chest x ra on 7/15 pulmonary edema and small pleural effusions  -SpO2 95-98% on RA  HCAP   -continue IV zosyn and vancomycin , off oxygen support, monitor pulse ox  -Last fever was on 7/15, Tmax was 102.7 on 7/15, no leukocytosis  -blood cx no growth so far, discontinue vancomycin 7/17   ESRD DD   -HD per nephrologist   Anemia of chronic disease   -H/H low but stable, no evidence of bleeding  -monitor H/H and transfuse for Hgb <7.0  HTN  -BP improving and normal, continue coreg, monitoring BP   Chronic SLE possible Acute flare   -continue azathioprine, plaquinil and prednisone   -CRP and ESR elevated   Mild hyponatremia and hyperkalemia   -resolved  Acute thrombocytopenia possible due to infection   -, repeat cbc in am     Code status: Full Code  DVT prophylaxis: heparin DISCHARGE DIAGNOSES / PLAN:      Acute hypoxic respiratory failure due to pneumonia and pulmonary edema  -improved, Augmentin 875-125 mg po bid for 5 more days  HCAP   -continue Augmentin with floraQ  ESRD DD   -Continue HD per nephrologist   Anemia of chronic disease   -H/H low but stable  HTN  -Continue coreg, monitoring BP   Chronic SLE possible Acute flare   -continue azathioprine, plaquinil and prednisone   Mild hyponatremia and hyperkalemia   -resolved  Acute thrombocytopenia possible due to infection   -, repeat cbc in am    PENDING TEST RESULTS:   At the time of discharge the following test results are still pending: None    FOLLOW UP APPOINTMENTS:    Follow-up Information     Follow up With Specialties Details Why Contact Info   1. Jack Santana, KHANH Nurse Practitioner In one week  [address]  [phone]        2. HD per Nephrologist    DIET: Renal Diet    ACTIVITY: Activity as tolerated    WOUND CARE: None    EQUIPMENT needed: None      DISCHARGE MEDICATIONS:  Current Discharge Medication List      START taking these medications    Details   oxyCODONE IR (ROXICODONE) 5 mg immediate release tablet Take 1 Tab by mouth every six (6) hours as needed for Pain for up to 3 days. Max Daily Amount: 20 mg.  Qty: 4 Tab, Refills: 0    Associated Diagnoses: Generalized abdominal pain      amoxicillin-clavulanate (AUGMENTIN) 500-125 mg per tablet Take 1 Tab by mouth every twelve (12) hours for 5 days. Qty: 10 Tab, Refills: 0      L.acidoph & parac-S.therm-Bifido (AJIT Q2/RISAQUAD-2) 16 billion cell cap cap Take 1 Cap by mouth daily. Qty: 10 Cap, Refills: 0         CONTINUE these medications which have NOT CHANGED    Details   oxyCODONE-acetaminophen (PERCOCET) 5-325 mg per tablet Take 1 Tab by mouth two (2) times daily as needed for Pain (severe back pain) for up to 30 days. Max Daily Amount: 2 Tabs.   Qty: 40 Tab, Refills: 0    Associated Diagnoses: Chronic bilateral low back pain without sciatica      polyethylene glycol (MIRALAX) 17 gram/dose powder Put 8 capfuls of Miralax in a 32 ounce beverage and drink it, followed by 3 capfuls twice daily for the next week and follow up with your primary care physician  Qty: 500 g, Refills: 0      bismuth subsalicylate (PEPTO-BISMOL) 262 mg/15 mL suspension Take 30 mL by mouth every four (4) hours as needed for Indigestion (diarrhea). May take 30 mL every 30 minutes as needed for up to 8 doses initially. Qty: 354 mL, Refills: 0      amitriptyline (ELAVIL) 50 mg tablet TAKE 1 TABLET BY MOUTH EVERY NIGHT AT BEDTIME  Qty: 90 Tab, Refills: 4    Comments: **Patient requests 90 days supply**      minoxidil (LONITEN) 2.5 mg tablet Take 2.5 mg by mouth daily. losartan (COZAAR) 100 mg tablet two (2) times a day. hydroxychloroquine (PLAQUENIL) 200 mg tablet TAKE 2 TABLETS BY MOUTH DAILY  Qty: 180 Tab, Refills: 6    Comments: **Patient requests 90 days supply**      ferric citrate (AURYXIA) 210 mg iron tablet Take 210 mg by mouth three (3) times daily (with meals). carvedilol (COREG) 25 mg tablet Take 1 Tab by mouth two (2) times daily (with meals). Qty: 60 Tab, Refills: 0      amLODIPine (NORVASC) 10 mg tablet Take 1 Tab by mouth daily. Qty: 30 Tab, Refills: 0      predniSONE (DELTASONE) 5 mg tablet Take 2 Tabs by mouth daily. Qty: 30 Tab, Refills: 0      gemfibrozil (LOPID) 600 mg tablet Take 300 mg by mouth daily. azaTHIOprine (IMURAN) 50 mg tablet Take 50 mg by mouth daily (after breakfast). albuterol (PROVENTIL HFA, VENTOLIN HFA, PROAIR HFA) 90 mcg/actuation inhaler Take 1-2 Puffs by inhalation every four (4) hours as needed for Wheezing or Shortness of Breath. Qty: 1 Inhaler, Refills: 2      pantoprazole (PROTONIX) 40 mg tablet Take 1 Tab by mouth Daily (before breakfast).   Qty: 30 Tab, Refills: 0      ondansetron (ZOFRAN ODT) 8 mg disintegrating tablet Take 1 Tab by mouth every eight (8) hours as needed for Nausea. Qty: 15 Tab, Refills: 0      acetaminophen (TYLENOL EXTRA STRENGTH) 500 mg tablet Take 2 Tabs by mouth every six (6) hours as needed for Pain. Qty: 30 Tab, Refills: 0      senna (SENNA) 8.6 mg tablet Take 1 Tab by mouth daily as needed for Constipation. for constipation               NOTIFY YOUR PHYSICIAN FOR ANY OF THE FOLLOWING:   Fever over 101 degrees for 24 hours. Chest pain, shortness of breath, fever, chills, nausea, vomiting, diarrhea, change in mentation, falling, weakness, bleeding. Severe pain or pain not relieved by medications. Or, any other signs or symptoms that you may have questions about.     DISPOSITION:    Home With:   OT  PT  HH  RN       Long term SNF/Inpatient Rehab   x Independent/assisted living    Hospice    Other:       PATIENT CONDITION AT DISCHARGE:     Functional status    Poor     Deconditioned    x Independent      Cognition   x  Lucid     Forgetful     Dementia      Catheters/lines (plus indication)    Franco     PICC     PEG    x None      Code status    x Full code     DNR      PHYSICAL EXAMINATION AT DISCHARGE:   Refer to Progress Note      CHRONIC MEDICAL DIAGNOSES:  Problem List as of 7/18/2019 Date Reviewed: 4/23/2019          Codes Class Noted - Resolved    HCAP (healthcare-associated pneumonia) ICD-10-CM: J18.9  ICD-9-CM: 486  7/16/2019 - Present        Anemia due to blood loss ICD-10-CM: D50.0  ICD-9-CM: 280.0  4/21/2019 - Present        Vaginal bleeding ICD-10-CM: N93.9  ICD-9-CM: 623.8  4/18/2019 - Present        Hyperkalemia ICD-10-CM: E87.5  ICD-9-CM: 276.7  3/4/2019 - Present        ESRD on hemodialysis ([facility] [address] ICD-10-CM: N18.6, Z99.2  ICD-9-CM: 585.6, V45.11  8/27/2018 - Present        SOB (shortness of breath) ICD-10-CM: R06.02  ICD-9-CM: 786.05  8/15/2018 - Present        Rib pain on right side ICD-10-CM: R07.81  ICD-9-CM: 786.50  7/2/2018 - Present        Troponin level elevated ICD-10-CM: R74.8  ICD-9-CM: 790.6  6/17/2018 - Present        Intra-abdominal abscess [facility]) ICD-10-CM: K65.1  ICD-9-CM: 567.22  5/12/2018 - Present        Sepsis ([facility] [address] ICD-10-CM: A41.9  ICD-9-CM: 038.9, 995.91  4/29/2018 - Present        Generalized abdominal pain ICD-10-CM: R10.84  ICD-9-CM: 789.07  4/4/2018 - Present        Other constipation ICD-10-CM: K59.09  ICD-9-CM: 564.09  4/4/2018 - Present        Other ascites ICD-10-CM: R18.8  ICD-9-CM: 789.59  4/4/2018 - Present        Peritonitis ([facility] [address] ICD-10-CM: K65.9  ICD-9-CM: 567.9  3/11/2018 - Present        History of pulmonary embolism ICD-10-CM: K22.271  ICD-9-CM: V12.55  12/8/2017 - Present        Hypomagnesemia ICD-10-CM: E83.42  ICD-9-CM: 275.2  10/30/2017 - Present        Hypocalcemia ICD-10-CM: E83.51  ICD-9-CM: 275.41  10/30/2017 - Present        Hypokalemia ICD-10-CM: E87.6  ICD-9-CM: 276.8  10/27/2017 - Present        Hypertension (Chronic) ICD-10-CM: I10  ICD-9-CM: 401.9  10/14/2017 - Present        Chronic bilateral low back pain without sciatica ICD-10-CM: M54.5, G89.29  ICD-9-CM: 724.2, 338.29  5/26/2017 - Present        Anemia of renal disease ICD-10-CM: N18.9, D63.1  ICD-9-CM: 285.21  2/21/2017 - Present        Dependence on peritoneal dialysis [facility]) ICD-10-CM: Z99.2  ICD-9-CM: V45.11  2/21/2017 - Present        ACP (advance care planning) ICD-10-CM: Z71.89  ICD-9-CM: V65.49  2/21/2017 - Present        Malignant hypertension ICD-10-CM: I10  ICD-9-CM: 401.0  7/11/2016 - Present        Encounter for monitoring opioid maintenance therapy ICD-10-CM: Z51.81, Z79.891  ICD-9-CM: V58.83, V58.69  11/3/2015 - Present        Lupus nephritis ([facility] ICD-10-CM: M32.14  ICD-9-CM: 710.0, 583.81  3/10/2012 - Present        Lupus ICD-10-CM: M32.9  ICD-9-CM: 710.0  2/27/2012 - Present        RESOLVED: Neutropenia ([facility] ICD-10-CM: D70.9  ICD-9-CM: 288.00  9/15/2018 - 9/19/2018        RESOLVED: Anemia in chronic kidney disease ICD-10-CM: N18.9, D63.1  ICD-9-CM: 285.21  9/15/2018 - 9/19/2018        RESOLVED: Malignant hypertensive urgency ICD-10-CM: I16.0  ICD-9-CM: 401.0  9/10/2018 - 9/19/2018        RESOLVED: Hypertensive urgency ICD-10-CM: I16.0  ICD-9-CM: 401.9  7/19/2018 - 7/22/2018        RESOLVED: Sepsis ([facility] ICD-10-CM: A41.9  ICD-9-CM: 038.9, 995.91  12/12/2017 - 12/22/2017        RESOLVED: Pneumonia ICD-10-CM: J18.9  ICD-9-CM: 413  10/14/2017 - 12/8/2017        RESOLVED: Pleural effusion, left ICD-10-CM: J90  ICD-9-CM: 511.9  10/14/2017 - 12/8/2017        RESOLVED: ESRD on peritoneal dialysis ([facility] (Chronic) ICD-10-CM: N18.6, Z99.2  ICD-9-CM: 585.6, V45.11  10/7/2016 - 8/27/2018        RESOLVED: Idiopathic chronic inflammatory bowel disease ICD-10-CM: K63.89  ICD-9-CM: 558.9  8/28/2013 - 8/28/2013        RESOLVED: Abdominal pain ICD-10-CM: R10.9  ICD-9-CM: 789.00  8/28/2013 - 9/3/2013        RESOLVED: Hypoxia ICD-10-CM: R09.02  ICD-9-CM: 799.02  4/25/2013 - 4/30/2013        RESOLVED: Lupus nephritis ([facility] ICD-10-CM: M32.14  ICD-9-CM: 710.0, 583.81  4/27/2012 - 4/28/2012              Greater than 45 minutes were spent with the patient on counseling and coordination of care    Signed:   Deepika Villa MD  7/18/2019  7:59 AM

## 2019-07-18 NOTE — DIALYSIS
HD TRANSFER - OUT REPORT:    Verbal report given to Angus Novak RN on Angella Jimenez being transferred to  for routine progression of care       Report consisted of patient's Situation, Background, Assessment and Recommendations(SBAR). Information from the following report(s) Procedure Summary was reviewed with the receiving nurse.    $$ Method: Hemodialysis (19 0830)    NET Fluid Removed (mL): 3000 ml (19 1215)     Patient response to treatment:  Stable    Hemodialysis End Time: 0696 (19 1615)  If not documented, dialysis nurse to update post-dialysis row in HD/Filtration flowsheet     Medications /Volume expansion agents or Fluid boluses administered during treatment? no  Post-dialysis medication administration due?  yes, retacrit SQ    Fistula hemostasis? yes    Opportunity for questions and clarification was provided.      Patient transported with: [employer]

## 2019-07-18 NOTE — HOME CARE
[facility] liaison met with patient today @ [phone] to inform her [address]ion[address] received a H2H/PNA Order for 1-2 visits however the soonest we could provide the visit would be 7/22/19. Home health liaison did offer patient a Nagi Ibanez visit that could be scheduled for an earlier visit. Patient declined Nagi Ibanez, has dialysis 7/20  a follow up PCP appt 7/26, and preferred to wait for the [address] visit.  Marcos Pierre updated with patients choice.  Luca Munoz RN Liaison

## 2019-07-18 NOTE — PROGRESS NOTES
Hospitalist Progress Note  Braxton Fink MD  Answering service: [phone] from in house phone  Cell: [phone]      Date of Service:  2019  NAME:  Hussein Adam  :  1985  MRN:  [id number]      Admission Summary:     A 36years old lady with ESRD on HD, SLE, HTN, VTE, who presented to [facility] with dyspnea. Symptoms began 2 days ago. She developed a cough and fever followed by dyspnea. No exacerbating or alleviating factors. She denies missing HD. Interval history / Subjective:     She said she feels better. She said no family to give her ride to dialysis center this morning. Assessment & Plan:     Acute hypoxic respiratory failure due to pneumonia and pulmonary edema  -continue HD, IV zosyn and vancomycin, oxygen support, prn duo neb   -CTA chest no PE, RUL consolidation with pneumonia difficult to exclude, bilateral airspace disease compatible with pulmonary edema  -chest x ra on 7/15 pulmonary edema and small pleural effusions  -SpO2 95-98% on RA    HCAP   -continue IV zosyn and vancomycin , off oxygen support, monitor pulse ox  -Last fever was on 7/15, Tmax was 102.7 on 7/15, no leukocytosis  -blood cx no growth so far, discontinue vancomycin     ESRD DD   -HD per nephrologist     Anemia of chronic disease   -H/H low but stable, no evidence of bleeding  -monitor H/H and transfuse for Hgb <7.0    HTN  -BP improving and normal, continue coreg, monitoring BP    Chronic SLE ??  Acute flare   -continue azathioprine, plaquinil and prednisone   -CRP and ESR elevated    Mild hyponatremia and hyperkalemia   -resolved    Acute thrombocytopenia possible due to infection   -, repeat cbc in am    Code status: Full Code  DVT prophylaxis: heparin     Care Plan discussed with: Patient/Family and Nurse  Disposition: Home with family after dialysis      Hospital Problems  Date Reviewed: 2019 Codes Class Noted POA    HCAP (healthcare-associated pneumonia) ICD-10-CM: J18.9  ICD-9-CM: 970  7/16/2019 Unknown        Hyperkalemia ICD-10-CM: E87.5  ICD-9-CM: 276.7  3/4/2019 Unknown              Vital Signs:    Last 24hrs VS reviewed since prior progress note. Most recent are:  Visit Vitals  BP (!) 138/91 (BP 1 Location: Left arm, BP Patient Position: At rest)   Pulse 82   Temp 97.8 °F (36.6 °C)   Resp 18   Ht 5' 5\" (1.651 m)   Wt 92.8 kg (204 lb 9.4 oz)   SpO2 93%   BMI 34.05 kg/m²         Intake/Output Summary (Last 24 hours) at 7/18/2019 0745  Last data filed at 7/17/2019 1906  Gross per 24 hour   Intake 1000 ml   Output --   Net 1000 ml        Physical Examination:             Constitutional:  No acute distress, cooperative, pleasant    ENT:  Oral mucous moist, oropharynx benign. Neck supple,    Resp:  Decrease bronchial breath sound bilaterally. No wheezing/rhonchi/rales. No accessory muscle use   CV:  Regular rhythm, normal rate, no murmurs, gallops, rubs    GI:  Soft, non distended, non tender. normoactive bowel sounds, no hepatosplenomegaly     Musculoskeletal:  No edema    Neurologic:  Moves all extremities.   AAOx3, CN II-XII reviewed     Skin:  Good turgor, no rashes or ulcers       Data Review:    Review and/or order of clinical lab test  Review and/or order of tests in the radiology section of CPT  Review and/or order of tests in the medicine section of CPT      Labs:     Recent Labs     07/18/19  0131 07/17/19  0344   WBC 6.0 6.6   HGB 7.3* 7.5*   HCT 23.8* 24.7*   * 136*     Recent Labs     07/17/19  0344 07/16/19  0611 07/15/19  1750    132* 135*   K 4.6 5.2* 6.0*    99 97   CO2 25 22 24   BUN 48* 82* 74*   CREA 7.63* 11.10* 10.09*   GLU 94 95 96   CA 8.0* 7.9* 7.8*   MG  --  2.5*  --    PHOS  --  8.0*  --      Recent Labs     07/17/19  0344 07/15/19  1750   SGOT 17 24   ALT 9* 12   AP 63 75   TBILI 0.3 0.3   TP 6.7 8.0   ALB 2.5* 3.1*   GLOB 4.2* 4.9*     No results for input(s): INR, PTP, APTT in the last 72 hours. No lab exists for component: INREXT, INREXT   No results for input(s): FE, TIBC, PSAT, FERR in the last 72 hours. Lab Results   Component Value Date/Time    Folate 13.7 06/17/2018 03:00 PM      No results for input(s): PH, PCO2, PO2 in the last 72 hours.   Recent Labs     07/15/19  1750   TROIQ <0.05     Lab Results   Component Value Date/Time    Cholesterol, total 140 09/11/2018 06:17 AM    HDL Cholesterol 43 09/11/2018 06:17 AM    LDL, calculated 60.4 09/11/2018 06:17 AM    Triglyceride 183 (H) 09/11/2018 06:17 AM    CHOL/HDL Ratio 3.3 09/11/2018 06:17 AM     Lab Results   Component Value Date/Time    Glucose (POC) 90 04/19/2019 06:30 AM    Glucose (POC) 84 03/05/2019 08:11 AM    Glucose (POC) 104 (H) 08/16/2018 11:06 AM    Glucose (POC) 164 (H) 07/22/2018 11:43 AM    Glucose (POC) 138 (H) 07/21/2018 09:22 PM     Lab Results   Component Value Date/Time    Color YELLOW/STRAW 03/31/2019 06:07 PM    Appearance CLEAR 03/31/2019 06:07 PM    Specific gravity 1.015 03/31/2019 06:07 PM    Specific gravity 1.017 08/12/2016 09:06 PM    pH (UA) 8.5 (H) 03/31/2019 06:07 PM    Protein 100 (A) 03/31/2019 06:07 PM    Glucose NEGATIVE  03/31/2019 06:07 PM    Ketone NEGATIVE  03/31/2019 06:07 PM    Bilirubin NEGATIVE  03/31/2019 06:07 PM    Urobilinogen 0.2 03/31/2019 06:07 PM    Nitrites NEGATIVE  03/31/2019 06:07 PM    Leukocyte Esterase NEGATIVE  03/31/2019 06:07 PM    Epithelial cells MODERATE (A) 03/31/2019 06:07 PM    Bacteria NEGATIVE  03/31/2019 06:07 PM    WBC 0-4 03/31/2019 06:07 PM    RBC 0-5 03/31/2019 06:07 PM         Medications Reviewed:     Current Facility-Administered Medications   Medication Dose Route Frequency    epoetin pia-epbx (RETACRIT) injection 10,000 Units  10,000 Units SubCUTAneous Q TUE, THU & SAT    amitriptyline (ELAVIL) tablet 50 mg  50 mg Oral QHS    piperacillin-tazobactam (ZOSYN) 3.375 g in 0.9% sodium chloride (MBP/ADV) 100 mL  3.375 g IntraVENous Q12H    carvedilol (COREG) tablet 25 mg  25 mg Oral BID WITH MEALS    hydroxychloroquine (PLAQUENIL) tablet 400 mg  400 mg Oral DAILY WITH BREAKFAST    pantoprazole (PROTONIX) tablet 40 mg  40 mg Oral ACB    predniSONE (DELTASONE) tablet 10 mg  10 mg Oral DAILY    acetaminophen (TYLENOL) tablet 650 mg  650 mg Oral Q6H PRN    ondansetron (ZOFRAN) injection 4 mg  4 mg IntraVENous Q4H PRN    azaTHIOprine (IMURAN) tablet 50 mg  50 mg Oral DAILY AFTER BREAKFAST    heparin (porcine) injection 5,000 Units  5,000 Units SubCUTAneous Q8H    oxyCODONE IR (ROXICODONE) tablet 5 mg  5 mg Oral Q6H PRN    sodium chloride (NS) flush 5-40 mL  5-40 mL IntraVENous Q8H    sodium chloride (NS) flush 5-40 mL  5-40 mL IntraVENous PRN     ______________________________________________________________________  EXPECTED LENGTH OF STAY: 3d 19h  ACTUAL LENGTH OF STAY:          2                 Glendy Madrid MD

## 2019-07-18 NOTE — DISCHARGE INSTRUCTIONS
Discharge Instructions       PATIENT ID: Gabino Perez  MRN: [id number]   YOB: 1985    DATE OF ADMISSION: 7/15/2019  5:27 PM    DATE OF DISCHARGE: 7/18/2019    PRIMARY CARE PROVIDER: Apolonia Hunt NP     ATTENDING PHYSICIAN: Fermin Ruelas MD  DISCHARGING PROVIDER: Viola Chan MD    To contact this individual call [phone] and ask the  to page. If unavailable ask to be transferred the Adult Hospitalist Department. DISCHARGE DIAGNOSES   Acute hypoxic respiratory failure due to pneumonia and pulmonary edema  HCAP   ESRD DD   Anemia of chronic disease   HTN  Chronic SLE ?? Acute flare   Mild hyponatremia and hyperkalemia    Acute thrombocytopenia possible due to infection     CONSULTATIONS: IP CONSULT TO NEPHROLOGY  IP CONSULT TO HOSPITALIST    PROCEDURES/SURGERIES: * No surgery found *    PENDING TEST RESULTS:   At the time of discharge the following test results are still pending: none     FOLLOW UP APPOINTMENTS:   Follow-up Information     Follow up With Specialties Details Why Contact Info   1. Apolonia Hunt NP Nurse Practitioner In one week  [address]  [phone]       2. HD per Nephrologist    ADDITIONAL CARE RECOMMENDATIONS:     DIET: Renal Diet    ACTIVITY: Activity as tolerated    WOUND CARE: None    EQUIPMENT needed: None      DISCHARGE MEDICATIONS:   See Medication Reconciliation Form    · It is important that you take the medication exactly as they are prescribed. · Keep your medication in the bottles provided by the pharmacist and keep a list of the medication names, dosages, and times to be taken in your wallet. · Do not take other medications without consulting your doctor. NOTIFY YOUR PHYSICIAN FOR ANY OF THE FOLLOWING:   Fever over 101 degrees for 24 hours. Chest pain, shortness of breath, fever, chills, nausea, vomiting, diarrhea, change in mentation, falling, weakness, bleeding.  Severe pain or pain not relieved by medications. Or, any other signs or symptoms that you may have questions about.       DISPOSITION:    Home With:   OT  PT  HH  RN       SNF/Inpatient Rehab/LTAC   x Independent/assisted living    Hospice    Other:     CDMP Checked:   Yes x     PROBLEM LIST Updated:  Yes x       Signed:   Gumaro Donahue MD  7/18/2019  7:46 AM

## 2019-07-18 NOTE — PROGRESS NOTES
RENAL  PROGRESS NOTE        Subjective:   Seeing and examined  On HD,doing well  Objective:   VITALS SIGNS:    Visit Vitals  /90   Pulse 78   Temp 98.6 °F (37 °C) (Oral)   Resp 18   Ht 5' 5\" (1.651 m)   Wt 92.8 kg (204 lb 9.4 oz)   SpO2 93%   BMI 34.05 kg/m²       O2 Device: Room air   O2 Flow Rate (L/min): 2 l/min   Temp (24hrs), Av.3 °F (36.8 °C), Min:97.8 °F (36.6 °C), Max:98.6 °F (37 °C)         PHYSICAL EXAM:    Check av access  NAD  DATA REVIEW:     INTAKE / OUTPUT:   Last shift:      No intake/output data recorded. Last 3 shifts:  190 -  0700  In: 1350 [P.O.:800;  I.V.:550]  Out: -     Intake/Output Summary (Last 24 hours) at 2019 1034  Last data filed at 2019 1906  Gross per 24 hour   Intake 540 ml   Output --   Net 540 ml         LABS:   Recent Labs     19  0131 19  0344 19  0611   WBC 6.0 6.6 7.1   HGB 7.3* 7.5* 7.8*   HCT 23.8* 24.7* 24.9*   * 136* 118*     Recent Labs     19  0344 19  0611 07/15/19  1750    132* 135*   K 4.6 5.2* 6.0*    99 97   CO2 25 22 24   GLU 94 95 96   BUN 48* 82* 74*   CREA 7.63* 11.10* 10.09*   CA 8.0* 7.9* 7.8*   MG  --  2.5*  --    PHOS  --  8.0*  --    ALB 2.5*  --  3.1*   TBILI 0.3  --  0.3   SGOT 17  --  24   ALT 9*  --  12           Assessment:     ESRD pt with SOB and Fever  [facility] unit; TTS  hyperkalemia  CT with RUL PNA   Plan:   AB   HD now  DEANDRA  Discussed with her  Valdemar Bueno MD

## 2019-07-18 NOTE — PROGRESS NOTES
1945: report received from SHANTEL Rankin 20 2050: Pt requested Elavil HS that she takes as an outpatient. Contacted on-call hospitalist and order was provided. 2112Mihaela Duong from [facility] called for clarification on inpatient dialysis for Thursday. Informed that patient will not not need inpatient dialysis d/t planned discharge in AM.    0224: TRANSFER - OUT REPORT:    Verbal report given to Derik BOSCH(name) on [place] [facility]  being transferred to (unit) for routine progression of care       Report consisted of patients Situation, Background, Assessment and   Recommendations(SBAR). Information from the following report(s) SBAR, Kardex, ED Summary, Intake/Output, MAR, Recent Results, Med Rec Status and Cardiac Rhythm NSR was reviewed with the receiving nurse. Lines:   Peripheral IV 07/15/19 Left Forearm (Active)   Site Assessment Clean, dry, & intact 7/17/2019  8:00 PM   Phlebitis Assessment 0 7/17/2019  8:00 PM   Infiltration Assessment 0 7/17/2019  8:00 PM   Dressing Status Clean, dry, & intact 7/17/2019  8:00 PM   Dressing Type Transparent 7/17/2019  8:00 PM   Hub Color/Line Status Pink; Infusing 7/17/2019  8:00 PM   Action Taken Open ports on tubing capped 7/17/2019  8:00 PM   Alcohol Cap Used Yes 7/17/2019  8:00 PM        Opportunity for questions and clarification was provided.       Patient transported with:   Monitor  Registered Nurse  Tech

## 2019-07-18 NOTE — PROGRESS NOTES
1945: report received from SHANTEL Rankin 20 2050: Pt requested Elavil HS that she takes as an outpatient. Contacted on-call hospitalist and order was provided. 2112Mili Bruno from [facility] called for clarification on inpatient dialysis for Thursday.  Informed that patient will not not need inpatient dialysis d/t planned discharge in AM.

## 2019-07-18 NOTE — DIALYSIS
TRANSFER - IN REPORT:    Verbal report received from JOHN Stuart RN on [facility] [place] being received from  for ordered procedure      Report consisted of patients Situation, Background, Assessment and Recommendations(SBAR). Information from the following report(s) SBAR was reviewed with the receiving nurse. Opportunity for questions and clarification was provided. Assessment completed upon patients arrival to unit and care assumed.

## 2019-07-18 NOTE — PROGRESS NOTES
Bedside and Verbal shift change report given to Homero Mcgill RN (oncoming nurse) by Sotero Teague RN (offgoing nurse). Report included the following information SBAR, Kardex, Intake/Output and MAR. I have reviewed discharge instructions with the patient. The patient verbalized understanding.

## 2019-07-19 ENCOUNTER — PATIENT OUTREACH (OUTPATIENT)
Dept: FAMILY MEDICINE CLINIC | Age: 34
End: 2019-07-19

## 2019-07-19 NOTE — PROGRESS NOTES
.  Hospital Discharge Follow-Up      Date/Time:  2019 11:44 AM    Patient was admitted to [facility] on 7/15/19 and discharged on 19 for HCPNA and COPD. The physician discharge summary was available at the time of outreach. Patient was contacted within 1 business days of discharge. Top Challenges reviewed with the provider   · [address] [place] [address] admission for Acute hypoxia r/t pneumonia and pulmonary edema  · Continue Augmentin with FloraQ  · CTA of chest -no PE, RUL consolidation with PNA  Difficult to exclude-pulmonary edema  · CXR on 7/15/19-Pulmonary edema and small pleural effusion  · Anemia- monitor CBC transfuse if < 7.0  · ESRD- continue dialysis on  SAT at [facility]  Results for Lesly Lewis (MRN [id number]) as of 2019 15:00    2019 06:11 2019 03:44 2019 01:31   HGB  7.8 (L) 7.5 (L) 7.3 (L)   HCT  24.9 (L) 24.7 (L) 23.8 (L)     2019 06:11 2019 03:44 2019 01:31   BUN  82 (H) 48 (H)    Creatinine  11.10 (H) 7.63 (H)         Method of communication with provider :Phone, staff message    Inpatient RRAT score: undetermined  Was this a readmission? no   Patient stated reason for the readmission:     Nurse Navigator (NN) contacted the patient by telephone to perform post hospital discharge assessment. Verified name and  with patient as identifiers. Provided introduction to self, and explanation of the Nurse Navigator role. Reviewed discharge instructions and red flags with patient who verbalized understanding. Patient given an opportunity to ask questions and does not have any further questions or concerns at this time. The patient agrees to contact the PCP office for questions related to their healthcare. NN provided contact information for future reference. Disease Specific:   Pneumonia    Summary of patient's top problems: 1. PNA-Back from vacation in [place] and see in [place] for complaint of generalized pain, fatigue and shortness of breath. Dialysis patient tues/thurs/sat, has not missed any. 2. Anemia-Low H/H, has history of chronic anemia-to be transfused if <7.0 HGB      Home Health orders at discharge: [facility]: [facility]  Date of initial visit: 7/19/19    Durable Medical Equipment ordered/company:   Durable Medical Equipment received:     Barriers to care? Advance Care Planning:   Does patient have an Advance Directive:  not on file     Medication(s):   New Medications at Discharge: Oxycodone, Augmentin, FloraQ  Changed Medications at Discharge:   Discontinued Medications at Discharge:     Medication reconciliation was performed with patient, who verbalizes understanding of administration of home medications. There were no barriers to obtaining medications identified at this time. Referral to Pharm D needed: no     Current Outpatient Medications   Medication Sig    oxyCODONE IR (ROXICODONE) 5 mg immediate release tablet Take 1 Tab by mouth every six (6) hours as needed for Pain for up to 3 days. Max Daily Amount: 20 mg.    amoxicillin-clavulanate (AUGMENTIN) 500-125 mg per tablet Take 1 Tab by mouth every twelve (12) hours for 5 days.  L.acidoph & parac-S.therm-Bifido (AJIT Q2/RISAQUAD-2) 16 billion cell cap cap Take 1 Cap by mouth daily.  oxyCODONE-acetaminophen (PERCOCET) 5-325 mg per tablet Take 1 Tab by mouth two (2) times daily as needed for Pain (severe back pain) for up to 30 days. Max Daily Amount: 2 Tabs.  polyethylene glycol (MIRALAX) 17 gram/dose powder Put 8 capfuls of Miralax in a 32 ounce beverage and drink it, followed by 3 capfuls twice daily for the next week and follow up with your primary care physician    bismuth subsalicylate (PEPTO-BISMOL) 262 mg/15 mL suspension Take 30 mL by mouth every four (4) hours as needed for Indigestion (diarrhea). May take 30 mL every 30 minutes as needed for up to 8 doses initially.     amitriptyline (ELAVIL) 50 mg tablet TAKE 1 TABLET BY MOUTH EVERY NIGHT AT BEDTIME    minoxidil (LONITEN) 2.5 mg tablet Take 2.5 mg by mouth daily.  losartan (COZAAR) 100 mg tablet two (2) times a day.  hydroxychloroquine (PLAQUENIL) 200 mg tablet TAKE 2 TABLETS BY MOUTH DAILY    ondansetron (ZOFRAN ODT) 8 mg disintegrating tablet Take 1 Tab by mouth every eight (8) hours as needed for Nausea.  acetaminophen (TYLENOL EXTRA STRENGTH) 500 mg tablet Take 2 Tabs by mouth every six (6) hours as needed for Pain.  ferric citrate (AURYXIA) 210 mg iron tablet Take 210 mg by mouth three (3) times daily (with meals).  carvedilol (COREG) 25 mg tablet Take 1 Tab by mouth two (2) times daily (with meals).  amLODIPine (NORVASC) 10 mg tablet Take 1 Tab by mouth daily.  predniSONE (DELTASONE) 5 mg tablet Take 2 Tabs by mouth daily.  senna (SENNA) 8.6 mg tablet Take 1 Tab by mouth daily as needed for Constipation. for constipation    gemfibrozil (LOPID) 600 mg tablet Take 300 mg by mouth daily.  azaTHIOprine (IMURAN) 50 mg tablet Take 50 mg by mouth daily (after breakfast).  albuterol (PROVENTIL HFA, VENTOLIN HFA, PROAIR HFA) 90 mcg/actuation inhaler Take 1-2 Puffs by inhalation every four (4) hours as needed for Wheezing or Shortness of Breath.  pantoprazole (PROTONIX) 40 mg tablet Take 1 Tab by mouth Daily (before breakfast). No current facility-administered medications for this visit. There are no discontinued medications.     [facility] follow up appointment(s):   Future Appointments   Date Time Provider Rocio Hardy   7/22/2019  2:20 PM MD Shanna Shi 27   7/26/2019 To Be Determined ALEXANDRA Royal   7/26/2019  2:15 PM Rina Lockwood,  [address]   8/5/2019  9:30 AM Renard Cheatham MD [address] [facility]      Non-[facility] follow up appointment(s):   [facility]:  offered and patient declined       Goals        Patient [address] [facility] (pt-stated)        07/23/18   · Patient remains full code, says mother will make ACP decisions, not interested in completing form at this time. 10/31/18  · Addressed with patient, who states she has information, mother is decision maker. Encouraged to schedule with NN  · NN will follow up in one week. pk             Other      post discharge self- management      07/19/19   Educated patient on the following:  · Take your medicines exactly as prescribed. Call your doctor if you think you are having a problem with your medicine. · Stop taking certain medicines if your doctor asks you to. They may be causing your high potassium levels. If you have concerns about stopping medicine, talk with your doctor. · If you have kidney, heart, or liver disease and have to limit fluids, talk with your doctor before you increase the amount of fluids you drink. If the doctor says it's okay, drink plenty of fluids. This means drinking enough so that your urine is light yellow or clear like water. · Avoid strenuous exercise until your doctor tells you it is okay. · Potassium is in many foods, including vegetables, fruits, and milk products. Foods high in potassium include bananas, cantaloupe, broccoli, milk, potatoes, and tomatoes. · Low potassium foods include blueberries, raspberries, cucumber, white or brown rice, spaghetti, and macaroni. · Do not use a salt substitute without talking to your doctor first. Most of these are very high in potassium. · Be sure to tell your doctor about any prescription, over-the-counter, or herbal medicines you take. Some of these can raise potassium  Stay healthy  · Get a flu shot every year. · Get a pneumococcal vaccine shot. If you have had one before, ask your doctor whether you need another dose. Two different types of pneumococcal vaccines are recommended for people ages 72 and older. · If you must be around people with colds or the flu, wash your hands often. · Do not smoke.  This is the most important step you can take to prevent more damage to your lungs. If you need help quitting, talk to your doctor about stop-smoking programs and medicines. These can increase your chances of quitting for good. · Avoid secondhand smoke, air pollution, and high altitudes. Also avoid cold, dry air and hot, humid air. Stay at home with your windows closed when air pollution is bad. Exercise and eat well  · If your doctor recommends it, get more exercise. Walking is a good choice. Bit by bit, increase the amount you walk every day. Try for at least 30 minutes on most days of the week. · Eat regular, well-balanced meals. Eating right keeps your energy levels up and helps your body fight infection. · Get plenty of rest and sleep. · Keep all follow up appointment as scheduled  · Pain management on 7/22/19  · PCP follow up on 7/26/19  · NN will continue to follow.  pk

## 2019-07-20 LAB
BACTERIA SPEC CULT: NORMAL
SERVICE CMNT-IMP: NORMAL

## 2019-07-22 ENCOUNTER — OFFICE VISIT (OUTPATIENT)
Dept: NEUROLOGY | Age: 34
End: 2019-07-22

## 2019-07-22 VITALS
WEIGHT: 204.59 LBS | RESPIRATION RATE: 20 BRPM | DIASTOLIC BLOOD PRESSURE: 86 MMHG | OXYGEN SATURATION: 96 % | SYSTOLIC BLOOD PRESSURE: 126 MMHG | HEIGHT: 65 IN | HEART RATE: 96 BPM | BODY MASS INDEX: 34.09 KG/M2

## 2019-07-22 DIAGNOSIS — G89.29 CHRONIC BILATERAL LOW BACK PAIN WITHOUT SCIATICA: ICD-10-CM

## 2019-07-22 DIAGNOSIS — M54.50 CHRONIC BILATERAL LOW BACK PAIN WITHOUT SCIATICA: ICD-10-CM

## 2019-07-22 DIAGNOSIS — G47.00 INSOMNIA, UNSPECIFIED TYPE: Primary | ICD-10-CM

## 2019-07-22 RX ORDER — AMITRIPTYLINE HYDROCHLORIDE 75 MG/1
75 TABLET, FILM COATED ORAL
Qty: 90 TAB | Refills: 0 | Status: SHIPPED | OUTPATIENT
Start: 2019-07-22 | End: 2019-09-19 | Stop reason: SDUPTHER

## 2019-07-22 RX ORDER — OXYCODONE AND ACETAMINOPHEN 5; 325 MG/1; MG/1
1 TABLET ORAL
Qty: 30 TAB | Refills: 0 | Status: SHIPPED | OUTPATIENT
Start: 2019-07-22 | End: 2019-08-19 | Stop reason: SDUPTHER

## 2019-07-22 NOTE — PROGRESS NOTES
Interval HPI:     This is a 36 y.o. female who is following up for     PMHx: chronic back pain, hx of fibromyalgia, hx of DVT/ coumadin, hx of Lupus, mild lower lumbar disc degeneration (L4-5 and L5-S1 with small annular tear at L5-S1)    Chief Complaint   Patient presents with    Back Pain     follow up (pain mgmt)     No significant change in moderate lower back pain. Still seeing Ob regarding pelvic pain/ ovarian cyst  Continues taking Percocet 5/ 325 one to two tabs per day. Admitted at [facility] last week for Pneumonia  Pt asking to increase her Amitriptyline as not helping her sleep  Denies any hx of CAD or dysrhythmia       Tried/ failed: Gabapentin, Cymbalta, Amitriptyline (only helped get to sleep), Lyrica (abnormal behaviors), Hydrocodone (\"too strong\")    Reviewed : no abberant behaviors. Received a Rx for 4 tablets of Oxycodone 5 mg on 7-18-19 when discharged from [facility].  Last filled on 6-24-19 (#40 tabs)       Brief ROS: as above      Allergies   Allergen Reactions    Compazine [Prochlorperazine Edisylate] Nausea and Vomiting and Palpitations     \"hot and lightheaded\"     Current Outpatient Medications   Medication Sig Dispense Refill    oxyCODONE-acetaminophen (PERCOCET) 5-325 mg per tablet Take 1 Tab by mouth two (2) times daily as needed for Pain (severe back pain) for up to 30 days. Max Daily Amount: 2 Tabs. 30 Tab 0    amitriptyline (ELAVIL) 75 mg tablet Take 1 Tab by mouth nightly for 90 days. For sleep difficulty 90 Tab 0    amoxicillin-clavulanate (AUGMENTIN) 500-125 mg per tablet Take 1 Tab by mouth every twelve (12) hours for 5 days. 10 Tab 0    L.acidoph & parac-S.therm-Bifido (AJIT Q2/RISAQUAD-2) 16 billion cell cap cap Take 1 Cap by mouth daily.  10 Cap 0    polyethylene glycol (MIRALAX) 17 gram/dose powder Put 8 capfuls of Miralax in a 32 ounce beverage and drink it, followed by 3 capfuls twice daily for the next week and follow up with your primary care physician 500 g 0    bismuth subsalicylate (PEPTO-BISMOL) 262 mg/15 mL suspension Take 30 mL by mouth every four (4) hours as needed for Indigestion (diarrhea). May take 30 mL every 30 minutes as needed for up to 8 doses initially. 354 mL 0    minoxidil (LONITEN) 2.5 mg tablet Take 2.5 mg by mouth daily.  losartan (COZAAR) 100 mg tablet two (2) times a day.  hydroxychloroquine (PLAQUENIL) 200 mg tablet TAKE 2 TABLETS BY MOUTH DAILY 180 Tab 6    ondansetron (ZOFRAN ODT) 8 mg disintegrating tablet Take 1 Tab by mouth every eight (8) hours as needed for Nausea. 15 Tab 0    acetaminophen (TYLENOL EXTRA STRENGTH) 500 mg tablet Take 2 Tabs by mouth every six (6) hours as needed for Pain. 30 Tab 0    ferric citrate (AURYXIA) 210 mg iron tablet Take 210 mg by mouth three (3) times daily (with meals).  carvedilol (COREG) 25 mg tablet Take 1 Tab by mouth two (2) times daily (with meals). 60 Tab 0    amLODIPine (NORVASC) 10 mg tablet Take 1 Tab by mouth daily. 30 Tab 0    predniSONE (DELTASONE) 5 mg tablet Take 2 Tabs by mouth daily. 30 Tab 0    senna (SENNA) 8.6 mg tablet Take 1 Tab by mouth daily as needed for Constipation. for constipation      gemfibrozil (LOPID) 600 mg tablet Take 300 mg by mouth daily.  azaTHIOprine (IMURAN) 50 mg tablet Take 50 mg by mouth daily (after breakfast).  albuterol (PROVENTIL HFA, VENTOLIN HFA, PROAIR HFA) 90 mcg/actuation inhaler Take 1-2 Puffs by inhalation every four (4) hours as needed for Wheezing or Shortness of Breath. 1 Inhaler 2    pantoprazole (PROTONIX) 40 mg tablet Take 1 Tab by mouth Daily (before breakfast).  30 Tab 0       Physical Exam  Vitals:    07/22/19 1438   BP: 126/86   BP 1 Location: Left arm   BP Patient Position: Sitting   Pulse: 96   Resp: 20   SpO2: 96%   Weight: 92.8 kg (204 lb 9.4 oz)   Height: 5' 5\" (1.651 m)     PMHx:   Past Medical History:   Diagnosis Date    Anemia     secondary to lupus    Asthma     no inhaler use in past 2 to 3 years    Carditis     Chronic kidney disease     ESRD    Chronic pain     DDD (degenerative disc disease), lumbar     [facility]     GERD (gastroesophageal reflux disease)     Hemod[facility] ([facility] 2017    [address]  Tuesday,  Thursday,  and Saturday. 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  Poor historian 2018    With medications    Thromboembolus [facility]) 2013    lungs    Transfusion history     Last Transfusion 2017  at [facility]     PSHx:  has a past surgical history that includes hx  section (2006); hx other surgical (9/16/15); hx vascular access (Right, 2017); and hx vascular access (Right, ). SocHx:  reports that she has never smoked. She has never used smokeless tobacco. She reports that she has current or past drug history. Drugs: Prescription and OTC. She reports that she does not drink alcohol. FHx: family history includes Cancer in an other family member; Diabetes in her father and mother; Hypertension in her father. Awake, alert, conversant  Neck: supple  Ext: dialysis fistula in right forearm    MSK:  Lumbar spine:  + tender to palpation across lower lumbar paraspinal muscles  Mild pain with back extension > flexion     Focused Neurological Exam     Mental status:   Alert and oriented to person, place situation  Mood appears stable  Normal thought processes    CNs: EOMI, Face symmetric, Hearing/ Language normal  Sensory: intact light touch  Motor: 4/ 5 strength in arms and legs    Reflexes: not examined today  Gait: normal    Impression    ICD-10-CM ICD-9-CM    1.  Insomnia, unspecified type G47.00 780.52 amitriptyline (ELAVIL) 75 mg tablet   2.  Chronic bilateral low back pain without sciatica M54.5 724.2 oxyCODONE-acetaminophen (PERCOCET) 5-325 mg per tablet    G89.29 338.29         1) Chronic bilateral lower back pain without sciatica    Reduce Percocet 5-325 one tablet daily prn severe pain (# 30 tabs this month)  Increased Amitriptyline       Signed By: Jf Shin MD     July 22, 2019

## 2019-07-22 NOTE — PROGRESS NOTES
Patient is here for a follow up for back pain (pain mgmt). She states that she has been feeling bad, she had pneumonia. Starting to get over it. No other concerns at this time.

## 2019-07-26 ENCOUNTER — HOME CARE VISIT (OUTPATIENT)
Dept: HOME HEALTH SERVICES | Facility: HOME HEALTH | Age: 34
End: 2019-07-26

## 2019-07-27 ENCOUNTER — APPOINTMENT (OUTPATIENT)
Dept: CT IMAGING | Age: 34
End: 2019-07-27
Attending: EMERGENCY MEDICINE
Payer: MEDICARE

## 2019-07-27 ENCOUNTER — HOSPITAL ENCOUNTER (EMERGENCY)
Age: 34
Discharge: HOME | End: 2019-07-28
Attending: EMERGENCY MEDICINE
Payer: MEDICARE

## 2019-07-27 DIAGNOSIS — R10.84 ABDOMINAL PAIN, GENERALIZED: Primary | ICD-10-CM

## 2019-07-27 DIAGNOSIS — K59.03 DRUG-INDUCED CONSTIPATION: ICD-10-CM

## 2019-07-27 LAB
ALBUMIN SERPL-MCNC: 3.3 G/DL (ref 3.5–5)
ALBUMIN/GLOB SERPL: 0.6 {RATIO} (ref 1.1–2.2)
ALP SERPL-CCNC: 77 U/L (ref 45–117)
ALT SERPL-CCNC: 15 U/L (ref 12–78)
ANION GAP SERPL CALC-SCNC: 10 MMOL/L (ref 5–15)
AST SERPL-CCNC: 17 U/L (ref 15–37)
BASOPHILS # BLD: 0 K/UL (ref 0–0.1)
BASOPHILS NFR BLD: 0 % (ref 0–1)
BILIRUB SERPL-MCNC: 0.3 MG/DL (ref 0.2–1)
BUN SERPL-MCNC: 25 MG/DL (ref 6–20)
BUN/CREAT SERPL: 4 (ref 12–20)
CALCIUM SERPL-MCNC: 8.9 MG/DL (ref 8.5–10.1)
CHLORIDE SERPL-SCNC: 94 MMOL/L (ref 97–108)
CO2 SERPL-SCNC: 32 MMOL/L (ref 21–32)
COMMENT, HOLDF: NORMAL
CREAT SERPL-MCNC: 5.99 MG/DL (ref 0.55–1.02)
DIFFERENTIAL METHOD BLD: ABNORMAL
EOSINOPHIL # BLD: 0 K/UL (ref 0–0.4)
EOSINOPHIL NFR BLD: 0 % (ref 0–7)
ERYTHROCYTE [DISTWIDTH] IN BLOOD BY AUTOMATED COUNT: 14.5 % (ref 11.5–14.5)
GLOBULIN SER CALC-MCNC: 5.1 G/DL (ref 2–4)
GLUCOSE SERPL-MCNC: 114 MG/DL (ref 65–100)
HCG SERPL QL: NEGATIVE
HCT VFR BLD AUTO: 28.8 % (ref 35–47)
HGB BLD-MCNC: 9.3 G/DL (ref 11.5–16)
IMM GRANULOCYTES # BLD AUTO: 0.1 K/UL (ref 0–0.04)
IMM GRANULOCYTES NFR BLD AUTO: 1 % (ref 0–0.5)
LIPASE SERPL-CCNC: 80 U/L (ref 73–393)
LYMPHOCYTES # BLD: 0.5 K/UL (ref 0.8–3.5)
LYMPHOCYTES NFR BLD: 7 % (ref 12–49)
MCH RBC QN AUTO: 29.3 PG (ref 26–34)
MCHC RBC AUTO-ENTMCNC: 32.3 G/DL (ref 30–36.5)
MCV RBC AUTO: 90.9 FL (ref 80–99)
MONOCYTES # BLD: 0.3 K/UL (ref 0–1)
MONOCYTES NFR BLD: 4 % (ref 5–13)
NEUTS SEG # BLD: 6.2 K/UL (ref 1.8–8)
NEUTS SEG NFR BLD: 88 % (ref 32–75)
NRBC # BLD: 0 K/UL (ref 0–0.01)
NRBC BLD-RTO: 0 PER 100 WBC
PLATELET # BLD AUTO: 261 K/UL (ref 150–400)
PMV BLD AUTO: 9.6 FL (ref 8.9–12.9)
POTASSIUM SERPL-SCNC: 4.3 MMOL/L (ref 3.5–5.1)
PROT SERPL-MCNC: 8.4 G/DL (ref 6.4–8.2)
RBC # BLD AUTO: 3.17 M/UL (ref 3.8–5.2)
RBC MORPH BLD: ABNORMAL
SAMPLES BEING HELD,HOLD: NORMAL
SODIUM SERPL-SCNC: 136 MMOL/L (ref 136–145)
WBC # BLD AUTO: 7.1 K/UL (ref 3.6–11)

## 2019-07-27 PROCEDURE — 80053 COMPREHEN METABOLIC PANEL: CPT

## 2019-07-27 PROCEDURE — 96374 THER/PROPH/DIAG INJ IV PUSH: CPT

## 2019-07-27 PROCEDURE — 99284 EMERGENCY DEPT VISIT MOD MDM: CPT

## 2019-07-27 PROCEDURE — 96375 TX/PRO/DX INJ NEW DRUG ADDON: CPT

## 2019-07-27 PROCEDURE — 83690 ASSAY OF LIPASE: CPT

## 2019-07-27 PROCEDURE — 74176 CT ABD & PELVIS W/O CONTRAST: CPT

## 2019-07-27 PROCEDURE — 74011250636 HC RX REV CODE- 250/636: Performed by: EMERGENCY MEDICINE

## 2019-07-27 PROCEDURE — 96376 TX/PRO/DX INJ SAME DRUG ADON: CPT

## 2019-07-27 PROCEDURE — 84703 CHORIONIC GONADOTROPIN ASSAY: CPT

## 2019-07-27 PROCEDURE — 85025 COMPLETE CBC W/AUTO DIFF WBC: CPT

## 2019-07-27 PROCEDURE — 36415 COLL VENOUS BLD VENIPUNCTURE: CPT

## 2019-07-27 RX ORDER — MORPHINE SULFATE 2 MG/ML
4 INJECTION, SOLUTION INTRAMUSCULAR; INTRAVENOUS
Status: COMPLETED | OUTPATIENT
Start: 2019-07-27 | End: 2019-07-27

## 2019-07-27 RX ORDER — ONDANSETRON 8 MG/1
8 TABLET, ORALLY DISINTEGRATING ORAL
Qty: 15 TAB | Refills: 0 | Status: SHIPPED | OUTPATIENT
Start: 2019-07-27 | End: 2019-10-18

## 2019-07-27 RX ORDER — DICYCLOMINE HYDROCHLORIDE 20 MG/1
20 TABLET ORAL EVERY 6 HOURS
Qty: 20 TAB | Refills: 0 | Status: ON HOLD | OUTPATIENT
Start: 2019-07-27 | End: 2019-08-14

## 2019-07-27 RX ORDER — MORPHINE SULFATE 2 MG/ML
6 INJECTION, SOLUTION INTRAMUSCULAR; INTRAVENOUS
Status: COMPLETED | OUTPATIENT
Start: 2019-07-27 | End: 2019-07-27

## 2019-07-27 RX ORDER — ONDANSETRON 2 MG/ML
4 INJECTION INTRAMUSCULAR; INTRAVENOUS
Status: COMPLETED | OUTPATIENT
Start: 2019-07-27 | End: 2019-07-27

## 2019-07-27 RX ORDER — NALOXONE HYDROCHLORIDE 1 MG/ML
2 INJECTION INTRAMUSCULAR; INTRAVENOUS; SUBCUTANEOUS
Status: COMPLETED | OUTPATIENT
Start: 2019-07-28 | End: 2019-07-28

## 2019-07-27 RX ADMIN — MORPHINE SULFATE 6 MG: 2 INJECTION, SOLUTION INTRAMUSCULAR; INTRAVENOUS at 23:02

## 2019-07-27 RX ADMIN — ONDANSETRON 4 MG: 2 INJECTION INTRAMUSCULAR; INTRAVENOUS at 22:12

## 2019-07-27 RX ADMIN — MORPHINE SULFATE 4 MG: 2 INJECTION, SOLUTION INTRAMUSCULAR; INTRAVENOUS at 22:11

## 2019-07-28 ENCOUNTER — HOSPITAL ENCOUNTER (EMERGENCY)
Age: 34
Discharge: HOME | DRG: 388 | End: 2019-07-29
Attending: EMERGENCY MEDICINE | Admitting: EMERGENCY MEDICINE
Payer: MEDICARE

## 2019-07-28 VITALS
HEIGHT: 65 IN | HEART RATE: 97 BPM | OXYGEN SATURATION: 95 % | RESPIRATION RATE: 18 BRPM | TEMPERATURE: 98.3 F | WEIGHT: 199.74 LBS | BODY MASS INDEX: 33.28 KG/M2 | SYSTOLIC BLOOD PRESSURE: 144 MMHG | DIASTOLIC BLOOD PRESSURE: 98 MMHG

## 2019-07-28 DIAGNOSIS — R10.84 ABDOMINAL PAIN, GENERALIZED: Primary | ICD-10-CM

## 2019-07-28 DIAGNOSIS — K59.00 CONSTIPATION, UNSPECIFIED CONSTIPATION TYPE: ICD-10-CM

## 2019-07-28 PROCEDURE — 99284 EMERGENCY DEPT VISIT MOD MDM: CPT

## 2019-07-28 PROCEDURE — 74011250637 HC RX REV CODE- 250/637: Performed by: EMERGENCY MEDICINE

## 2019-07-28 PROCEDURE — 96372 THER/PROPH/DIAG INJ SC/IM: CPT

## 2019-07-28 PROCEDURE — 74011250636 HC RX REV CODE- 250/636: Performed by: EMERGENCY MEDICINE

## 2019-07-28 RX ORDER — NALOXONE HYDROCHLORIDE 1 MG/ML
4 INJECTION INTRAMUSCULAR; INTRAVENOUS; SUBCUTANEOUS
Status: COMPLETED | OUTPATIENT
Start: 2019-07-28 | End: 2019-07-28

## 2019-07-28 RX ORDER — FACIAL-BODY WIPES
10 EACH TOPICAL
Status: COMPLETED | OUTPATIENT
Start: 2019-07-29 | End: 2019-07-29

## 2019-07-28 RX ORDER — DICYCLOMINE HYDROCHLORIDE 10 MG/ML
20 INJECTION INTRAMUSCULAR
Status: COMPLETED | OUTPATIENT
Start: 2019-07-28 | End: 2019-07-28

## 2019-07-28 RX ORDER — MAGNESIUM CITRATE
296 SOLUTION, ORAL ORAL
Status: COMPLETED | OUTPATIENT
Start: 2019-07-28 | End: 2019-07-28

## 2019-07-28 RX ADMIN — NALOXONE HYDROCHLORIDE 2 MG: 1 INJECTION PARENTERAL at 00:01

## 2019-07-28 RX ADMIN — DICYCLOMINE HYDROCHLORIDE 20 MG: 20 INJECTION, SOLUTION INTRAMUSCULAR at 21:15

## 2019-07-28 RX ADMIN — MAGESIUM CITRATE 296 ML: 1.75 LIQUID ORAL at 23:05

## 2019-07-28 RX ADMIN — NALOXONE HYDROCHLORIDE 4 MG: 1 INJECTION PARENTERAL at 21:16

## 2019-07-28 RX ADMIN — LACTULOSE 30 ML: 20 SOLUTION ORAL at 21:15

## 2019-07-28 NOTE — DISCHARGE INSTRUCTIONS
- Bentyl as needed for pain. - Zofran as needed for nausea. - Your constipation is likely related to your Percocet. You were given Oral Narcan to help relive the constipation. Please take Miralax daily while on Percocet. You may also take Senna as needed. - Return to ED for fever, increased pain, persistent vomiting, concern for dehydration, any other concerns. Patient Education        Abdominal Pain: Care Instructions  Your Care Instructions    Abdominal pain has many possible causes. Some aren't serious and get better on their own in a few days. Others need more testing and treatment. If your pain continues or gets worse, you need to be rechecked and may need more tests to find out what is wrong. You may need surgery to correct the problem. Don't ignore new symptoms, such as fever, nausea and vomiting, urination problems, pain that gets worse, and dizziness. These may be signs of a more serious problem. Your doctor may have recommended a follow-up visit in the next 8 to 12 hours. If you are not getting better, you may need more tests or treatment. The doctor has checked you carefully, but problems can develop later. If you notice any problems or new symptoms, get medical treatment right away. Follow-up care is a key part of your treatment and safety. Be sure to make and go to all appointments, and call your doctor if you are having problems. It's also a good idea to know your test results and keep a list of the medicines you take. How can you care for yourself at home? · Rest until you feel better. · To prevent dehydration, drink plenty of fluids, enough so that your urine is light yellow or clear like water. Choose water and other caffeine-free clear liquids until you feel better. If you have kidney, heart, or liver disease and have to limit fluids, talk with your doctor before you increase the amount of fluids you drink.   · If your stomach is upset, eat mild foods, such as rice, dry toast or crackers, bananas, and applesauce. Try eating several small meals instead of two or three large ones. · Wait until 48 hours after all symptoms have gone away before you have spicy foods, alcohol, and drinks that contain caffeine. · Do not eat foods that are high in fat. · Avoid anti-inflammatory medicines such as aspirin, ibuprofen (Advil, Motrin), and naproxen (Aleve). These can cause stomach upset. Talk to your doctor if you take daily aspirin for another health problem. When should you call for help? Call 911 anytime you think you may need emergency care. For example, call if:    · You passed out (lost consciousness).     · You pass maroon or very bloody stools.     · You vomit blood or what looks like coffee grounds.     · You have new, severe belly pain.    Call your doctor now or seek immediate medical care if:    · Your pain gets worse, especially if it becomes focused in one area of your belly.     · You have a new or higher fever.     · Your stools are black and look like tar, or they have streaks of blood.     · You have unexpected vaginal bleeding.     · You have symptoms of a urinary tract infection. These may include:  ? Pain when you urinate. ? Urinating more often than usual.  ? Blood in your urine.     · You are dizzy or lightheaded, or you feel like you may faint.    Watch closely for changes in your health, and be sure to contact your doctor if:    · You are not getting better after 1 day (24 hours). Where can you learn more? Go to http://jorge-rochelle.info/. Enter C976 in the search box to learn more about \"Abdominal Pain: Care Instructions. \"  Current as of: September 23, 2018  Content Version: 12.1  © 4112-9123 [employer]. Care instructions adapted under license by [employer] (which disclaims liability or warranty for this information).  If you have questions about a medical condition or this instruction, always ask your healthcare professional. [address] any warranty or liability for your use of this information. Patient Education        Constipation: Care Instructions  Your Care Instructions    Constipation means that you have a hard time passing stools (bowel movements). People pass stools from 3 times a day to once every 3 days. What is normal for you may be different. Constipation may occur with pain in the rectum and cramping. The pain may get worse when you try to pass stools. Sometimes there are small amounts of bright red blood on toilet paper or the surface of stools. This is because of enlarged veins near the rectum (hemorrhoids). A few changes in your diet and lifestyle may help you avoid ongoing constipation. Your doctor may also prescribe medicine to help loosen your stool. Some medicines can cause constipation. These include pain medicines and antidepressants. Tell your doctor about all the medicines you take. Your doctor may want to make a medicine change to ease your symptoms. Follow-up care is a key part of your treatment and safety. Be sure to make and go to all appointments, and call your doctor if you are having problems. It's also a good idea to know your test results and keep a list of the medicines you take. How can you care for yourself at home? · Drink plenty of fluids, enough so that your urine is light yellow or clear like water. If you have kidney, heart, or liver disease and have to limit fluids, talk with your doctor before you increase the amount of fluids you drink. · Include high-fiber foods in your diet each day. These include fruits, vegetables, beans, and whole grains. · Get at least 30 minutes of exercise on most days of the week. Walking is a good choice. You also may want to do other activities, such as running, swimming, cycling, or playing tennis or team sports. · Take a fiber supplement, such as Citrucel or Metamucil, every day.  Read and follow all instructions on the label. · Schedule time each day for a bowel movement. A daily routine may help. Take your time having your bowel movement. · Support your feet with a small step stool when you sit on the toilet. This helps flex your hips and places your pelvis in a squatting position. · Your doctor may recommend an over-the-counter laxative to relieve your constipation. Examples are Milk of Magnesia and MiraLax. Read and follow all instructions on the label. Do not use laxatives on a long-term basis. When should you call for help? Call your doctor now or seek immediate medical care if:    · You have new or worse belly pain.     · You have new or worse nausea or vomiting.     · You have blood in your stools.    Watch closely for changes in your health, and be sure to contact your doctor if:    · Your constipation is getting worse.     · You do not get better as expected. Where can you learn more? Go to http://jorge-rochelle.info/. Enter 21 [phone] in the search box to learn more about \"Constipation: Care Instructions. \"  Current as of: September 23, 2018  Content Version: 12.1  © 0058-2958 [employer]. Care instructions adapted under license by [employer] (which disclaims liability or warranty for this information). If you have questions about a medical condition or this instruction, always ask your healthcare professional. [address] any warranty or liability for your use of this information.

## 2019-07-28 NOTE — ED PROVIDER NOTES
The patient presents to the emergency room with chief complaint of abdominal pain. She complains of upper abdominal pain. The pain began this last night. The abdominal pain is constant and sharp. Pain is 10 out of 10. Nothing makes the pain better or worse. She has no fever. She has nausea, but denies any vomiting. Last bowel movement was yesterday was normal.  She has a very little urinary output as she is end-stage renal disease on hemodialysis. She has not noted any dysuria. She has history of catheters for peritoneal dialysis in the past.  She has no history of abdominal surgeries. She reports similar symptoms in the past when she had peritonitis when she was on peritoneal dialysis. No medications were taken prior to arrival she did take oxycodone with no relief. She is on oxycodone for back pain. Her mother drove her to the emergency room. She has ESRD and is on HD Tu, Th, Sat. She did attend dialysis today. The history is provided by the patient. Abdominal Pain    Associated symptoms include nausea. Pertinent negatives include no fever, no diarrhea, no vomiting, no dysuria, no headaches and no chest pain. Past Medical History:   Diagnosis Date    Anemia     secondary to lupus    Asthma     no inhaler use in past 2 to 3 years    Carditis     Chronic kidney disease     ESRD    Chronic pain     DDD (degenerative disc disease), lumbar     ESRD (end stage renal disease) (HCC)     GERD (gastroesophageal reflux disease)     Hemod[facility] ([facility] 12/21/2017    [address]  Tuesday,  Thursday,  and Saturday. 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  Poor historian 2018    With medications    Thromboembolus ([facility] 2013    lungs    Transfusion history     Last Transfusion 2017  at [facility]       Past Surgical History:   Procedure Laterality Date    HX  SECTION  11/2006    x1    HX OTHER SURGICAL  9/16/15    INSERTION PD CATH;  Removed 2017    HX VASCULAR ACCESS Right 2017    Double-Lumen henry catheter upper chest    HX VASCULAR ACCESS Right 2018    right upper arm         Family History:   Problem Relation Age of Onset    Diabetes Father     Hypertension Father     Cancer Other         aunt with breast cancer    Diabetes Mother        Social History     Socioeconomic History    Marital status: SINGLE     Spouse name: Not on file    Number of children: Not on file    Years of education: Not on file    Highest education level: Not on file   Occupational History    Not on file   Social Needs    Financial resource strain: Not on file    Food insecurity:     Worry: Not on file     Inability: Not on file    Transportation needs:     Medical: Not on file     Non-medical: Not on file   Tobacco Use    Smoking status: Never Smoker    Smokeless tobacco: Never Used   Substance and Sexual Activity    Alcohol use: No    Drug use: Yes     Types: Prescription, OTC    Sexual activity: Not Currently   Lifestyle    Physical activity:     Days per week: Not on file     Minutes per session: Not on file    Stress: Not on file   Relationships    Social connections:     Talks on phone: Not on file     Gets together: Not on file     Attends Yarsanism service: Not on file     Active member of club or organization: Not on file     Attends meetings of clubs or organizations: Not on file     Relationship status: Not on file    Intimate partner violence:     Fear of current or ex partner: Not on file     Emotionally abused: Not on file     Physically abused: Not on file     Forced sexual activity: Not on file   Other Topics Concern     Service No    Blood Transfusions No    Caffeine Concern No    Occupational Exposure No    Hobby Hazards No    Sleep Concern No    Stress Concern No    Weight Concern No    Special Diet No    Back Care Yes     Comment: low back pain    Exercise No    Bike Helmet No    Seat Belt Yes    Self-Exams No   Social History Narrative    Lives with parents and daughter. ALLERGIES: Compazine [prochlorperazine edisylate]    Review of Systems   Constitutional: Negative for appetite change and fever. HENT: Negative for congestion, nosebleeds and sore throat. Eyes: Negative for discharge and visual disturbance. Respiratory: Negative for cough and shortness of breath. Cardiovascular: Negative for chest pain. Gastrointestinal: Positive for abdominal pain and nausea. Negative for diarrhea and vomiting. Genitourinary: Negative for dysuria. Musculoskeletal: Negative. Skin: Negative for rash. Neurological: Negative for weakness and headaches. Hematological: Negative for adenopathy. Psychiatric/Behavioral: Negative. All other systems reviewed and are negative. Vitals:    07/27/19 2230 07/27/19 2245 07/27/19 2247 07/28/19 0000   BP: (!) 153/106 (!) 150/117 (!) 156/117 (!) 144/98   Pulse:       Resp:       Temp:       SpO2: 97% 95% 93% 95%   Weight:       Height:                Physical Exam   Constitutional: She appears well-developed and well-nourished. HENT:   Head: Normocephalic and atraumatic. Eyes: Conjunctivae are normal.   Neck: Normal range of motion. Neck supple. Cardiovascular: Normal rate, regular rhythm and normal heart sounds. Graft RUE with good thrill. Pulmonary/Chest: Effort normal and breath sounds normal.   Abdominal: Soft. Normal appearance and bowel sounds are normal.   There are scars present on the abdomen from her peritoneal catheters. Neurological: She is alert. Skin: Skin is warm and dry. Psychiatric: She has a normal mood and affect. Nursing note and vitals reviewed. [facility]       Procedures    A/P:  1. Abdominal pain -the abdominal pain is most likely related to her constipation. Trial of Bentyl as needed for pain. 2 constipation the patient is on chronic opiates. -Oral Narcan was given to help relieve her opiate-induced constipation. 3.  Nausea -Zofran PRN      Patient's results have been reviewed with them. Patient and/or family have verbally conveyed their understanding and agreement of the patient's signs, symptoms, diagnosis, treatment and prognosis and additionally agree to follow up as recommended or return to the Emergency Room should their condition change prior to follow-up. Discharge instructions have also been provided to the patient with some educational information regarding their diagnosis as well a list of reasons why they would want to return to the ER prior to their follow-up appointment should their condition change.

## 2019-07-28 NOTE — ED TRIAGE NOTES
Complains of generalized abd pain. States that it started last night. Denies n/v/d. Described as a constant ache.

## 2019-07-29 ENCOUNTER — PATIENT OUTREACH (OUTPATIENT)
Dept: FAMILY MEDICINE CLINIC | Age: 34
End: 2019-07-29

## 2019-07-29 VITALS
TEMPERATURE: 97.9 F | HEART RATE: 102 BPM | WEIGHT: 199.3 LBS | SYSTOLIC BLOOD PRESSURE: 172 MMHG | OXYGEN SATURATION: 96 % | DIASTOLIC BLOOD PRESSURE: 114 MMHG | RESPIRATION RATE: 18 BRPM | BODY MASS INDEX: 33.16 KG/M2

## 2019-07-29 PROCEDURE — 74011250637 HC RX REV CODE- 250/637: Performed by: EMERGENCY MEDICINE

## 2019-07-29 RX ORDER — ONDANSETRON 4 MG/1
8 TABLET, ORALLY DISINTEGRATING ORAL
Status: COMPLETED | OUTPATIENT
Start: 2019-07-29 | End: 2019-07-29

## 2019-07-29 RX ADMIN — ONDANSETRON 8 MG: 4 TABLET, ORALLY DISINTEGRATING ORAL at 01:46

## 2019-07-29 RX ADMIN — BISACODYL 10 MG: 10 SUPPOSITORY RECTAL at 00:08

## 2019-07-29 NOTE — ED TRIAGE NOTES
Patient returns with increased abd pain. States the pain started to get worse this am. States she has taken the medication prescribed but has not had a BM.

## 2019-07-29 NOTE — ED PROVIDER NOTES
The patient presents to the emergency room with chief complaint of abdominal pain. Pain began 2 nights ago. Pain is in her upper abdomen and constant. Pain is severe, 10 out of 10. Last bowel movement was on Thursday and normal.  She has not had any fever. She does have history of peritoneal dialysis cath use. She did have peritonitis, and transition to hemodialysis. She had dialysis on Saturday. She was seen in the emergency room yesterday. She had unremarkable CT scan which showed moderate diffuse stool without rectal infection. The patient was given Narcan. She is also discharged with instructions to take MiraLAX. She reports taking MiraLAX this morning, but has not yet had a bowel movement. She reports continued pain. The history is provided by the patient. Abdominal Pain    Associated symptoms include nausea and constipation. Pertinent negatives include no fever, no diarrhea, no vomiting, no dysuria, no headaches and no chest pain. Past Medical History:   Diagnosis Date    Anemia     secondary to lupus    Asthma     no inhaler use in past 2 to 3 years    Carditis     Chronic kidney disease     ESRD    Chronic pain     DDD (degenerative disc disease), lumbar     ESRD (end stage renal disease) (HCC)     GERD (gastroesophageal reflux disease)     Hemodialysis patient ([facility] 12/21/2017    [address]  Tuesday,  Thursday,  and Saturday. 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     Poor historian 01/17/2018    With medications    Thromboembolus [facility]) 2013    lungs    Transfusion history     Last Transfusion 12/21/2017  at [facility]       Past Surgical History:   Procedure Laterality Date    HX  SECTION  11/2006    x1    HX OTHER SURGICAL  9/16/15    INSERTION PD CATH;  Removed 2017    HX VASCULAR ACCESS Right 2017    Double-Lumen henry catheter upper chest    HX VASCULAR ACCESS Right 2018    right upper arm         Family History:   Problem Relation Age of Onset    Diabetes Father     Hypertension Father     Cancer Other         aunt with breast cancer    Diabetes Mother        Social History     Socioeconomic History    Marital status: SINGLE     Spouse name: Not on file    Number of children: Not on file    Years of education: Not on file    Highest education level: Not on file   Occupational History    Not on file   Social Needs    Financial resource strain: Not on file    Food insecurity:     Worry: Not on file     Inability: Not on file    Transportation needs:     Medical: Not on file     Non-medical: Not on file   Tobacco Use    Smoking status: Never Smoker    Smokeless tobacco: Never Used   Substance and Sexual Activity    Alcohol use: No    Drug use: Yes     Types: Prescription, OTC    Sexual activity: Not Currently   Lifestyle    Physical activity:     Days per week: Not on file     Minutes per session: Not on file    Stress: Not on file   Relationships    Social connections:     Talks on phone: Not on file     Gets together: Not on file     Attends Sikhism service: Not on file     Active member of club or organization: Not on file     Attends meetings of clubs or organizations: Not on file     Relationship status: Not on file    Intimate partner violence:     Fear of current or ex partner: Not on file     Emotionally abused: Not on file     Physically abused: Not on file     Forced sexual activity: Not on file   Other Topics Concern     Service No    Blood Transfusions No    Caffeine Concern No    Occupational Exposure No    Hobby Hazards No    Sleep Concern No    Stress Concern No    Weight Concern No    Special Diet No    Back Care Yes     Comment: low back pain    Exercise No    Bike Helmet No    Seat Belt Yes    Self-Exams No   Social History Narrative    Lives with parents and daughter. ALLERGIES: Compazine [prochlorperazine edisylate]    Review of Systems   Constitutional: Negative for appetite change and fever. HENT: Negative for congestion, nosebleeds and sore throat. Eyes: Negative for discharge and visual disturbance. Respiratory: Negative for cough and shortness of breath. Cardiovascular: Negative for chest pain. Gastrointestinal: Positive for abdominal pain, constipation and nausea. Negative for diarrhea and vomiting. Genitourinary: Negative for dysuria. Musculoskeletal: Negative. Skin: Negative for rash. Neurological: Negative for weakness and headaches. Hematological: Negative for adenopathy. Psychiatric/Behavioral: Negative. All other systems reviewed and are negative. Vitals:    07/28/19 2037 07/28/19 2044 07/29/19 0157   BP:  (!) 173/120 (!) 172/114   Pulse: (!) 105  (!) 102   Resp: 16  18   Temp: 97.9 °F (36.6 °C)     SpO2: 94%  96%   Weight: 90.4 kg (199 lb 4.7 oz)              Physical Exam   Constitutional: She appears well-developed and well-nourished. HENT:   Head: Normocephalic and atraumatic. Eyes: Conjunctivae are normal.   Neck: Normal range of motion. Neck supple. Cardiovascular: Normal rate, regular rhythm and normal heart sounds. Pulmonary/Chest: Effort normal and breath sounds normal.   Abdominal: Soft. Bowel sounds are normal. There is tenderness (mild upper abdominal TTP). There is negative Guadarrama's sign. Neurological: She is alert. Skin: Skin is warm and dry. Psychiatric: She has a normal mood and affect. Nursing note and vitals reviewed. MDM       Procedures    A/P:  1. Abdominal pain - suspect from constipation. 2. Constipation - had small BM in ED after Lactulose, oral Narcan, and Soap suds enema. Magnesium citrate was ordered. She drank <30 ml of magnesium citrate. Advised take Miralax 4 x daily for 2 days and to follow-up with PCP. Continue Bentyl prn pain. Patient's results have been reviewed with them. Patient and/or family have verbally conveyed their understanding and agreement of the patient's signs, symptoms, diagnosis, treatment and prognosis and additionally agree to follow up as recommended or return to the Emergency Room should their condition change prior to follow-up. Discharge instructions have also been provided to the patient with some educational information regarding their diagnosis as well a list of reasons why they would want to return to the ER prior to their follow-up appointment should their condition change.

## 2019-07-29 NOTE — DISCHARGE INSTRUCTIONS
- Please increase Miralax to 4 times a day for the next 48 hrs. - Please follow-up with your PCP on Tuesday if your symptoms have not improved. - Return to ED for fever, increased pain, persistent vomiting, any other concerns. Patient Education        Abdominal Pain: Care Instructions  Your Care Instructions    Abdominal pain has many possible causes. Some aren't serious and get better on their own in a few days. Others need more testing and treatment. If your pain continues or gets worse, you need to be rechecked and may need more tests to find out what is wrong. You may need surgery to correct the problem. Don't ignore new symptoms, such as fever, nausea and vomiting, urination problems, pain that gets worse, and dizziness. These may be signs of a more serious problem. Your doctor may have recommended a follow-up visit in the next 8 to 12 hours. If you are not getting better, you may need more tests or treatment. The doctor has checked you carefully, but problems can develop later. If you notice any problems or new symptoms, get medical treatment right away. Follow-up care is a key part of your treatment and safety. Be sure to make and go to all appointments, and call your doctor if you are having problems. It's also a good idea to know your test results and keep a list of the medicines you take. How can you care for yourself at home? · Rest until you feel better. · To prevent dehydration, drink plenty of fluids, enough so that your urine is light yellow or clear like water. Choose water and other caffeine-free clear liquids until you feel better. If you have kidney, heart, or liver disease and have to limit fluids, talk with your doctor before you increase the amount of fluids you drink. · If your stomach is upset, eat mild foods, such as rice, dry toast or crackers, bananas, and applesauce. Try eating several small meals instead of two or three large ones.   · Wait until 48 hours after all symptoms have gone away before you have spicy foods, alcohol, and drinks that contain caffeine. · Do not eat foods that are high in fat. · Avoid anti-inflammatory medicines such as aspirin, ibuprofen (Advil, Motrin), and naproxen (Aleve). These can cause stomach upset. Talk to your doctor if you take daily aspirin for another health problem. When should you call for help? Call 911 anytime you think you may need emergency care. For example, call if:    · You passed out (lost consciousness).     · You pass maroon or very bloody stools.     · You vomit blood or what looks like coffee grounds.     · You have new, severe belly pain.    Call your doctor now or seek immediate medical care if:    · Your pain gets worse, especially if it becomes focused in one area of your belly.     · You have a new or higher fever.     · Your stools are black and look like tar, or they have streaks of blood.     · You have unexpected vaginal bleeding.     · You have symptoms of a urinary tract infection. These may include:  ? Pain when you urinate. ? Urinating more often than usual.  ? Blood in your urine.     · You are dizzy or lightheaded, or you feel like you may faint.    Watch closely for changes in your health, and be sure to contact your doctor if:    · You are not getting better after 1 day (24 hours). Where can you learn more? Go to http://jorge-rochelle.info/. Enter D725 in the search box to learn more about \"Abdominal Pain: Care Instructions. \"  Current as of: September 23, 2018  Content Version: 12.1  © 1412-1822 [employer]. Care instructions adapted under license by [employer] (which disclaims liability or warranty for this information). If you have questions about a medical condition or this instruction, always ask your healthcare professional. [place] [address] any warranty or liability for your use of this information.          Patient Education        Constipation: Care Instructions  Your Care Instructions    Constipation means that you have a hard time passing stools (bowel movements). People pass stools from 3 times a day to once every 3 days. What is normal for you may be different. Constipation may occur with pain in the rectum and cramping. The pain may get worse when you try to pass stools. Sometimes there are small amounts of bright red blood on toilet paper or the surface of stools. This is because of enlarged veins near the rectum (hemorrhoids). A few changes in your diet and lifestyle may help you avoid ongoing constipation. Your doctor may also prescribe medicine to help loosen your stool. Some medicines can cause constipation. These include pain medicines and antidepressants. Tell your doctor about all the medicines you take. Your doctor may want to make a medicine change to ease your symptoms. Follow-up care is a key part of your treatment and safety. Be sure to make and go to all appointments, and call your doctor if you are having problems. It's also a good idea to know your test results and keep a list of the medicines you take. How can you care for yourself at home? · Drink plenty of fluids, enough so that your urine is light yellow or clear like water. If you have kidney, heart, or liver disease and have to limit fluids, talk with your doctor before you increase the amount of fluids you drink. · Include high-fiber foods in your diet each day. These include fruits, vegetables, beans, and whole grains. · Get at least 30 minutes of exercise on most days of the week. Walking is a good choice. You also may want to do other activities, such as running, swimming, cycling, or playing tennis or team sports. · Take a fiber supplement, such as Citrucel or Metamucil, every day. Read and follow all instructions on the label. · Schedule time each day for a bowel movement. A daily routine may help. Take your time having your bowel movement.   · Support your feet with a small step stool when you sit on the toilet. This helps flex your hips and places your pelvis in a squatting position. · Your doctor may recommend an over-the-counter laxative to relieve your constipation. Examples are Milk of Magnesia and MiraLax. Read and follow all instructions on the label. Do not use laxatives on a long-term basis. When should you call for help? Call your doctor now or seek immediate medical care if:    · You have new or worse belly pain.     · You have new or worse nausea or vomiting.     · You have blood in your stools.    Watch closely for changes in your health, and be sure to contact your doctor if:    · Your constipation is getting worse.     · You do not get better as expected. Where can you learn more? Go to http://jorge-rochelle.info/. Enter 21 [id number] in the search box to learn more about \"Constipation: Care Instructions. \"  Current as of: September 23, 2018  Content Version: 12.1  © 6036-1275 [employer]. Care instructions adapted under license by [employer] (which disclaims liability or warranty for this information). If you have questions about a medical condition or this instruction, always ask your healthcare professional. [place] [address] any warranty or liability for your use of this information.

## 2019-07-30 ENCOUNTER — HOSPITAL ENCOUNTER (EMERGENCY)
Age: 34
Discharge: HOME | DRG: 388 | End: 2019-07-30
Attending: EMERGENCY MEDICINE
Payer: MEDICARE

## 2019-07-30 ENCOUNTER — APPOINTMENT (OUTPATIENT)
Dept: GENERAL RADIOLOGY | Age: 34
DRG: 388 | End: 2019-07-30
Attending: EMERGENCY MEDICINE
Payer: MEDICARE

## 2019-07-30 VITALS
WEIGHT: 194 LBS | SYSTOLIC BLOOD PRESSURE: 149 MMHG | HEIGHT: 65 IN | RESPIRATION RATE: 12 BRPM | DIASTOLIC BLOOD PRESSURE: 100 MMHG | TEMPERATURE: 98.4 F | BODY MASS INDEX: 32.32 KG/M2 | HEART RATE: 95 BPM | OXYGEN SATURATION: 95 %

## 2019-07-30 DIAGNOSIS — R10.84 ABDOMINAL PAIN, GENERALIZED: Primary | ICD-10-CM

## 2019-07-30 DIAGNOSIS — K59.03 DRUG-INDUCED CONSTIPATION: ICD-10-CM

## 2019-07-30 LAB
ALBUMIN SERPL-MCNC: 3.1 G/DL (ref 3.5–5)
ALBUMIN/GLOB SERPL: 0.6 {RATIO} (ref 1.1–2.2)
ALP SERPL-CCNC: 68 U/L (ref 45–117)
ALT SERPL-CCNC: 12 U/L (ref 12–78)
ANION GAP SERPL CALC-SCNC: 15 MMOL/L (ref 5–15)
AST SERPL-CCNC: 20 U/L (ref 15–37)
BASOPHILS # BLD: 0 K/UL (ref 0–0.1)
BASOPHILS NFR BLD: 0 % (ref 0–1)
BILIRUB SERPL-MCNC: 0.3 MG/DL (ref 0.2–1)
BUN SERPL-MCNC: 62 MG/DL (ref 6–20)
BUN/CREAT SERPL: 5 (ref 12–20)
CALCIUM SERPL-MCNC: 8.9 MG/DL (ref 8.5–10.1)
CHLORIDE SERPL-SCNC: 94 MMOL/L (ref 97–108)
CO2 SERPL-SCNC: 26 MMOL/L (ref 21–32)
COMMENT, HOLDF: NORMAL
CREAT SERPL-MCNC: 11.37 MG/DL (ref 0.55–1.02)
DIFFERENTIAL METHOD BLD: ABNORMAL
EOSINOPHIL # BLD: 0 K/UL (ref 0–0.4)
EOSINOPHIL NFR BLD: 0 % (ref 0–7)
ERYTHROCYTE [DISTWIDTH] IN BLOOD BY AUTOMATED COUNT: 14.6 % (ref 11.5–14.5)
GLOBULIN SER CALC-MCNC: 4.9 G/DL (ref 2–4)
GLUCOSE SERPL-MCNC: 101 MG/DL (ref 65–100)
HCT VFR BLD AUTO: 29.4 % (ref 35–47)
HGB BLD-MCNC: 9.4 G/DL (ref 11.5–16)
IMM GRANULOCYTES # BLD AUTO: 0 K/UL (ref 0–0.04)
IMM GRANULOCYTES NFR BLD AUTO: 0 % (ref 0–0.5)
LIPASE SERPL-CCNC: 70 U/L (ref 73–393)
LYMPHOCYTES # BLD: 0.5 K/UL (ref 0.8–3.5)
LYMPHOCYTES NFR BLD: 5 % (ref 12–49)
MCH RBC QN AUTO: 29 PG (ref 26–34)
MCHC RBC AUTO-ENTMCNC: 32 G/DL (ref 30–36.5)
MCV RBC AUTO: 90.7 FL (ref 80–99)
MONOCYTES # BLD: 0.5 K/UL (ref 0–1)
MONOCYTES NFR BLD: 5 % (ref 5–13)
NEUTS SEG # BLD: 8.9 K/UL (ref 1.8–8)
NEUTS SEG NFR BLD: 90 % (ref 32–75)
NRBC # BLD: 0.03 K/UL (ref 0–0.01)
NRBC BLD-RTO: 0.3 PER 100 WBC
PLATELET # BLD AUTO: 236 K/UL (ref 150–400)
PMV BLD AUTO: 9.7 FL (ref 8.9–12.9)
POTASSIUM SERPL-SCNC: 5 MMOL/L (ref 3.5–5.1)
PROT SERPL-MCNC: 8 G/DL (ref 6.4–8.2)
RBC # BLD AUTO: 3.24 M/UL (ref 3.8–5.2)
RBC MORPH BLD: ABNORMAL
RBC MORPH BLD: ABNORMAL
SAMPLES BEING HELD,HOLD: NORMAL
SODIUM SERPL-SCNC: 135 MMOL/L (ref 136–145)
WBC # BLD AUTO: 9.9 K/UL (ref 3.6–11)

## 2019-07-30 PROCEDURE — 83690 ASSAY OF LIPASE: CPT

## 2019-07-30 PROCEDURE — 74019 RADEX ABDOMEN 2 VIEWS: CPT

## 2019-07-30 PROCEDURE — 71046 X-RAY EXAM CHEST 2 VIEWS: CPT

## 2019-07-30 PROCEDURE — 85025 COMPLETE CBC W/AUTO DIFF WBC: CPT

## 2019-07-30 PROCEDURE — 36415 COLL VENOUS BLD VENIPUNCTURE: CPT

## 2019-07-30 PROCEDURE — 99283 EMERGENCY DEPT VISIT LOW MDM: CPT

## 2019-07-30 PROCEDURE — 80053 COMPREHEN METABOLIC PANEL: CPT

## 2019-07-30 NOTE — PROGRESS NOTES
SURJIT Follow-up assessment:   Outbound call attempted to patient today to complete SURJIT follow up assessment. NN unable to contact patient. Unable to leave NN contact information left on VM due to mailbox being full. Patient has had 3 Ed admissions since last hospitalization on 7/15/19. All for abdominal pain. Patient says she will follow up with pain management. The pain is constant  And 10/10 in her upper abdomen. Ct done on 7/28/19 when she was seen in the Ed and showed moderated diffused stool without rectal infection. Patient has dialysis on [facility]THU Sat, NN will attempt calls later. 7/31/19- Patient was able to be reached today. Patient verified by 3 identifiers. Patient states she is about the same. Still having problems getting her bowels to move. Admits to taking the Ayesha and will be getting the Miralax today. Current Outpatient Medications:     dicyclomine (BENTYL) 20 mg tablet, Take 1 Tab by mouth every six (6) hours for 20 doses. , Disp: 20 Tab, Rfl: 0    ondansetron (ZOFRAN ODT) 8 mg disintegrating tablet, Take 1 Tab by mouth every eight (8) hours as needed for Nausea., Disp: 15 Tab, Rfl: 0    oxyCODONE-acetaminophen (PERCOCET) 5-325 mg per tablet, Take 1 Tab by mouth two (2) times daily as needed for Pain (severe back pain) for up to 30 days. Max Daily Amount: 2 Tabs., Disp: 30 Tab, Rfl: 0    amitriptyline (ELAVIL) 75 mg tablet, Take 1 Tab by mouth nightly for 90 days. For sleep difficulty, Disp: 90 Tab, Rfl: 0    L.acidoph & parac-S.therm-Bifido (AJIT Q2/RISAQUAD-2) 16 billion cell cap cap, Take 1 Cap by mouth daily. , Disp: 10 Cap, Rfl: 0    polyethylene glycol (MIRALAX) 17 gram/dose powder, Put 8 capfuls of Miralax in a 32 ounce beverage and drink it, followed by 3 capfuls twice daily for the next week and follow up with your primary care physician, Disp: 500 g, Rfl: 0    bismuth subsalicylate (PEPTO-BISMOL) 262 mg/15 mL suspension, Take 30 mL by mouth every four (4) hours as needed for Indigestion (diarrhea). May take 30 mL every 30 minutes as needed for up to 8 doses initially. , Disp: 354 mL, Rfl: 0    minoxidil (LONITEN) 2.5 mg tablet, Take 2.5 mg by mouth daily. , Disp: , Rfl:     losartan (COZAAR) 100 mg tablet, two (2) times a day., Disp: , Rfl:     hydroxychloroquine (PLAQUENIL) 200 mg tablet, TAKE 2 TABLETS BY MOUTH DAILY, Disp: 180 Tab, Rfl: 6    acetaminophen (TYLENOL EXTRA STRENGTH) 500 mg tablet, Take 2 Tabs by mouth every six (6) hours as needed for Pain., Disp: 30 Tab, Rfl: 0    ferric citrate (AURYXIA) 210 mg iron tablet, Take 210 mg by mouth three (3) times daily (with meals). , Disp: , Rfl:     carvedilol (COREG) 25 mg tablet, Take 1 Tab by mouth two (2) times daily (with meals). , Disp: 60 Tab, Rfl: 0    amLODIPine (NORVASC) 10 mg tablet, Take 1 Tab by mouth daily. , Disp: 30 Tab, Rfl: 0    predniSONE (DELTASONE) 5 mg tablet, Take 2 Tabs by mouth daily. , Disp: 30 Tab, Rfl: 0    senna (SENNA) 8.6 mg tablet, Take 1 Tab by mouth daily as needed for Constipation. for constipation, Disp: , Rfl:     gemfibrozil (LOPID) 600 mg tablet, Take 300 mg by mouth daily. , Disp: , Rfl:     azaTHIOprine (IMURAN) 50 mg tablet, Take 50 mg by mouth daily (after breakfast). , Disp: , Rfl:     albuterol (PROVENTIL HFA, VENTOLIN HFA, PROAIR HFA) 90 mcg/actuation inhaler, Take 1-2 Puffs by inhalation every four (4) hours as needed for Wheezing or Shortness of Breath., Disp: 1 Inhaler, Rfl: 2    pantoprazole (PROTONIX) 40 mg tablet, Take 1 Tab by mouth Daily (before breakfast). , Disp: 30 Tab, Rfl: 0          Case management Plan:  NN will continue to attempt contacts with patient by telephone or during office visit within next 7-10 days.  Will continue to follow as necessary for the remaining 30 days post visit and will reassess for needs before discharge

## 2019-07-30 NOTE — DISCHARGE INSTRUCTIONS
Patient Education        Abdominal Pain: Care Instructions  Your Care Instructions    Abdominal pain has many possible causes. Some aren't serious and get better on their own in a few days. Others need more testing and treatment. If your pain continues or gets worse, you need to be rechecked and may need more tests to find out what is wrong. You may need surgery to correct the problem. Don't ignore new symptoms, such as fever, nausea and vomiting, urination problems, pain that gets worse, and dizziness. These may be signs of a more serious problem. Your doctor may have recommended a follow-up visit in the next 8 to 12 hours. If you are not getting better, you may need more tests or treatment. The doctor has checked you carefully, but problems can develop later. If you notice any problems or new symptoms, get medical treatment right away. Follow-up care is a key part of your treatment and safety. Be sure to make and go to all appointments, and call your doctor if you are having problems. It's also a good idea to know your test results and keep a list of the medicines you take. How can you care for yourself at home? · Rest until you feel better. · To prevent dehydration, drink plenty of fluids, enough so that your urine is light yellow or clear like water. Choose water and other caffeine-free clear liquids until you feel better. If you have kidney, heart, or liver disease and have to limit fluids, talk with your doctor before you increase the amount of fluids you drink. · If your stomach is upset, eat mild foods, such as rice, dry toast or crackers, bananas, and applesauce. Try eating several small meals instead of two or three large ones. · Wait until 48 hours after all symptoms have gone away before you have spicy foods, alcohol, and drinks that contain caffeine. · Do not eat foods that are high in fat. · Avoid anti-inflammatory medicines such as aspirin, ibuprofen (Advil, Motrin), and naproxen (Aleve). These can cause stomach upset. Talk to your doctor if you take daily aspirin for another health problem. When should you call for help? Call 911 anytime you think you may need emergency care. For example, call if:    · You passed out (lost consciousness).     · You pass maroon or very bloody stools.     · You vomit blood or what looks like coffee grounds.     · You have new, severe belly pain.    Call your doctor now or seek immediate medical care if:    · Your pain gets worse, especially if it becomes focused in one area of your belly.     · You have a new or higher fever.     · Your stools are black and look like tar, or they have streaks of blood.     · You have unexpected vaginal bleeding.     · You have symptoms of a urinary tract infection. These may include:  ? Pain when you urinate. ? Urinating more often than usual.  ? Blood in your urine.     · You are dizzy or lightheaded, or you feel like you may faint.    Watch closely for changes in your health, and be sure to contact your doctor if:    · You are not getting better after 1 day (24 hours). Where can you learn more? Go to http://jorge-rochelle.info/. Enter L429 in the search box to learn more about \"Abdominal Pain: Care Instructions. \"  Current as of: September 23, 2018  Content Version: 12.1  © 9238-0660 [employer]. Care instructions adapted under license by [employer] (which disclaims liability or warranty for this information). If you have questions about a medical condition or this instruction, always ask your healthcare professional. [place] [address] any warranty or liability for your use of this information. Patient Education        Constipation: Care Instructions  Your Care Instructions    Constipation means that you have a hard time passing stools (bowel movements). People pass stools from 3 times a day to once every 3 days. What is normal for you may be different. Constipation may occur with pain in the rectum and cramping. The pain may get worse when you try to pass stools. Sometimes there are small amounts of bright red blood on toilet paper or the surface of stools. This is because of enlarged veins near the rectum (hemorrhoids). A few changes in your diet and lifestyle may help you avoid ongoing constipation. Your doctor may also prescribe medicine to help loosen your stool. Some medicines can cause constipation. These include pain medicines and antidepressants. Tell your doctor about all the medicines you take. Your doctor may want to make a medicine change to ease your symptoms. Follow-up care is a key part of your treatment and safety. Be sure to make and go to all appointments, and call your doctor if you are having problems. It's also a good idea to know your test results and keep a list of the medicines you take. How can you care for yourself at home? · Drink plenty of fluids, enough so that your urine is light yellow or clear like water. If you have kidney, heart, or liver disease and have to limit fluids, talk with your doctor before you increase the amount of fluids you drink. · Include high-fiber foods in your diet each day. These include fruits, vegetables, beans, and whole grains. · Get at least 30 minutes of exercise on most days of the week. Walking is a good choice. You also may want to do other activities, such as running, swimming, cycling, or playing tennis or team sports. · Take a fiber supplement, such as Citrucel or Metamucil, every day. Read and follow all instructions on the label. · Schedule time each day for a bowel movement. A daily routine may help. Take your time having your bowel movement. · Support your feet with a small step stool when you sit on the toilet. This helps flex your hips and places your pelvis in a squatting position. · Your doctor may recommend an over-the-counter laxative to relieve your constipation.  Examples are Milk of Magnesia and MiraLax. Read and follow all instructions on the label. Do not use laxatives on a long-term basis. When should you call for help? Call your doctor now or seek immediate medical care if:    · You have new or worse belly pain.     · You have new or worse nausea or vomiting.     · You have blood in your stools.    Watch closely for changes in your health, and be sure to contact your doctor if:    · Your constipation is getting worse.     · You do not get better as expected. Where can you learn more? Go to http://jorge-rochelle.info/. Enter 21  in the search box to learn more about \"Constipation: Care Instructions. \"  Current as of: September 23, 2018  Content Version: 12.1  © 7195-7415 [employer]. Care instructions adapted under license by [employer] (which disclaims liability or warranty for this information). If you have questions about a medical condition or this instruction, always ask your healthcare professional. [address] any warranty or liability for your use of this information.

## 2019-07-30 NOTE — ED TRIAGE NOTES
Triage: Pt advises that she has had abd pain since last night. Pt also reports nausea and vomiting with abd pain.

## 2019-07-30 NOTE — ED PROVIDER NOTES
Hanna Mckeon is a 35 yo F with abdominal pain She states that the pain started last night. Pain is in the middle of her abdomen. She has had nausea and vomiting. She was seen here 2 and 3 days ago for similar symptoms. She has ESRD and gets dialysis , and is scheduled to have dialysis at 10 am today. She has not had fever or chills. She states her last BM was yesterday. Past Medical History:   Diagnosis Date    Anemia     secondary to lupus    Asthma     no inhaler use in past 2 to 3 years    Carditis     Chronic kidney disease     ESRD    Chronic pain     DDD (degenerative disc disease), lumbar     ESRD (end stage renal disease) (HCC)     GERD (gastroesophageal reflux disease)     Hemodialysis patient ([facility] [address] 2017    [address]  Tuesday,  Thursday,  and Saturday. 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  Poor historian 2018    With medications    Thromboembolus ([facility] [address] 2013    lungs    Transfusion history     Last Transfusion 2017  at [facility]       Past Surgical History:   Procedure Laterality Date    HX  SECTION  11/2006    x1    HX OTHER SURGICAL  9/16/15    INSERTION PD CATH;  Removed 2017    HX VASCULAR ACCESS Right 2017    Double-Lumen henry catheter upper chest    HX VASCULAR ACCESS Right 2018    right upper arm         Family History:   Problem Relation Age of Onset    Diabetes Father     Hypertension Father     Cancer Other         aunt with breast cancer    Diabetes Mother        Social History     Socioeconomic History    Marital status: SINGLE     Spouse name: Not on file    Number of children: Not on file    Years of education: Not on file    Highest education level: Not on file   Occupational History    Not on file   Social Needs    Financial resource strain: Not on file    Food insecurity:     Worry: Not on file     Inability: Not on file    Transportation needs:     Medical: Not on file     Non-medical: Not on file   Tobacco Use    Smoking status: Never Smoker    Smokeless tobacco: Never Used   Substance and Sexual Activity    Alcohol use: No    Drug use: Yes     Types: Prescription, OTC    Sexual activity: Not Currently   Lifestyle    Physical activity:     Days per week: Not on file     Minutes per session: Not on file    Stress: Not on file   Relationships    Social connections:     Talks on phone: Not on file     Gets together: Not on file     Attends Sikhism service: Not on file     Active member of club or organization: Not on file     Attends meetings of clubs or organizations: Not on file     Relationship status: Not on file    Intimate partner violence:     Fear of current or ex partner: Not on file     Emotionally abused: Not on file     Physically abused: Not on file     Forced sexual activity: Not on file   Other Topics Concern     Service No    Blood Transfusions No    Caffeine Concern No    Occupational Exposure No    Hobby Hazards No    Sleep Concern No    Stress Concern No    Weight Concern No    Special Diet No    Back Care Yes     Comment: low back pain    Exercise No    Bike Helmet No    Seat Belt Yes    Self-Exams No   Social History Narrative    Lives with parents and daughter. ALLERGIES: Compazine [prochlorperazine edisylate]    Review of Systems   Constitutional: Negative for fever. HENT: Negative for sore throat. Eyes: Negative for visual disturbance. Respiratory: Negative for cough. Cardiovascular: Negative for chest pain. Gastrointestinal: Positive for abdominal pain, nausea and vomiting.    Genitourinary: Negative for dysuria. Musculoskeletal: Negative for back pain. Skin: Negative for rash. Neurological: Negative for headaches. Vitals:    07/30/19 0512   BP: (!) 155/103   Pulse: (!) 105   Resp: 12   Temp: 98.4 °F (36.9 °C)   SpO2: (!) 89%   Weight: 88 kg (194 lb 0.1 oz)   Height: 5' 5\" (1.651 m)            Physical Exam   Constitutional: She appears well-developed and well-nourished. No distress. HENT:   Head: Normocephalic and atraumatic. Mouth/Throat: Oropharynx is clear and moist.   Eyes: Conjunctivae and EOM are normal.   Neck: Normal range of motion and phonation normal.   Cardiovascular: Normal rate. Pulmonary/Chest: Effort normal. No respiratory distress. She has no wheezes. She has no rales. Abdominal: She exhibits no distension. There is tenderness in the periumbilical area. There is no rigidity, no rebound and no guarding. Musculoskeletal: Normal range of motion. She exhibits no tenderness. Neurological: She is alert. She is not disoriented. She exhibits normal muscle tone. Skin: Skin is warm and dry. Capillary refill takes less than 2 seconds. Nursing note and vitals reviewed. MDM       7:15 AM  Labs reviewed WBC stable, XR with moderate to large stool burden. Discussed with patient continued constipation. US e of opiate pain medications would make condition worse. Recommend continued miralax. Will discharge home.   Procedures

## 2019-07-31 ENCOUNTER — HOSPITAL ENCOUNTER (INPATIENT)
Age: 34
LOS: 14 days | Discharge: HOME | DRG: 388 | End: 2019-08-14
Attending: EMERGENCY MEDICINE | Admitting: INTERNAL MEDICINE
Payer: MEDICARE

## 2019-07-31 ENCOUNTER — APPOINTMENT (OUTPATIENT)
Dept: CT IMAGING | Age: 34
DRG: 388 | End: 2019-07-31
Attending: EMERGENCY MEDICINE
Payer: MEDICARE

## 2019-07-31 ENCOUNTER — HOME CARE VISIT (OUTPATIENT)
Dept: HOME HEALTH SERVICES | Facility: HOME HEALTH | Age: 34
End: 2019-07-31

## 2019-07-31 DIAGNOSIS — R11.10 VOMITING, INTRACTABILITY OF VOMITING NOT SPECIFIED, PRESENCE OF NAUSEA NOT SPECIFIED, UNSPECIFIED VOMITING TYPE: ICD-10-CM

## 2019-07-31 DIAGNOSIS — K56.600 PARTIAL SMALL BOWEL OBSTRUCTION (HCC): Primary | ICD-10-CM

## 2019-07-31 DIAGNOSIS — M32.9 LUPUS (HCC): ICD-10-CM

## 2019-07-31 DIAGNOSIS — N18.6 ESRD ON HEMODIALYSIS (HCC): ICD-10-CM

## 2019-07-31 DIAGNOSIS — Z99.2 ESRD ON HEMODIALYSIS (HCC): ICD-10-CM

## 2019-07-31 PROBLEM — K56.609 SBO (SMALL BOWEL OBSTRUCTION) (HCC): Status: ACTIVE | Noted: 2019-07-31

## 2019-07-31 LAB
ALBUMIN SERPL-MCNC: 3.8 G/DL (ref 3.5–5)
ALBUMIN/GLOB SERPL: 0.6 {RATIO} (ref 1.1–2.2)
ALP SERPL-CCNC: 83 U/L (ref 45–117)
ALT SERPL-CCNC: 14 U/L (ref 12–78)
ANION GAP SERPL CALC-SCNC: 16 MMOL/L (ref 5–15)
AST SERPL-CCNC: 21 U/L (ref 15–37)
ATRIAL RATE: 103 BPM
BASOPHILS # BLD: 0 K/UL (ref 0–0.1)
BASOPHILS NFR BLD: 0 % (ref 0–1)
BILIRUB SERPL-MCNC: 0.4 MG/DL (ref 0.2–1)
BUN SERPL-MCNC: 36 MG/DL (ref 6–20)
BUN/CREAT SERPL: 4 (ref 12–20)
CALCIUM SERPL-MCNC: 10.2 MG/DL (ref 8.5–10.1)
CALCULATED P AXIS, ECG09: 65 DEGREES
CALCULATED R AXIS, ECG10: 72 DEGREES
CALCULATED T AXIS, ECG11: 46 DEGREES
CHLORIDE SERPL-SCNC: 92 MMOL/L (ref 97–108)
CO2 SERPL-SCNC: 28 MMOL/L (ref 21–32)
COMMENT, HOLDF: NORMAL
CREAT SERPL-MCNC: 8.85 MG/DL (ref 0.55–1.02)
DIAGNOSIS, 93000: NORMAL
DIFFERENTIAL METHOD BLD: ABNORMAL
EOSINOPHIL # BLD: 0 K/UL (ref 0–0.4)
EOSINOPHIL NFR BLD: 0 % (ref 0–7)
ERYTHROCYTE [DISTWIDTH] IN BLOOD BY AUTOMATED COUNT: 14.7 % (ref 11.5–14.5)
GLOBULIN SER CALC-MCNC: 6.3 G/DL (ref 2–4)
GLUCOSE SERPL-MCNC: 137 MG/DL (ref 65–100)
HCT VFR BLD AUTO: 38.9 % (ref 35–47)
HGB BLD-MCNC: 11.7 G/DL (ref 11.5–16)
IMM GRANULOCYTES # BLD AUTO: 0.1 K/UL (ref 0–0.04)
IMM GRANULOCYTES NFR BLD AUTO: 1 % (ref 0–0.5)
LYMPHOCYTES # BLD: 0.6 K/UL (ref 0.8–3.5)
LYMPHOCYTES NFR BLD: 11 % (ref 12–49)
MCH RBC QN AUTO: 28.3 PG (ref 26–34)
MCHC RBC AUTO-ENTMCNC: 30.1 G/DL (ref 30–36.5)
MCV RBC AUTO: 94 FL (ref 80–99)
MONOCYTES # BLD: 0.5 K/UL (ref 0–1)
MONOCYTES NFR BLD: 9 % (ref 5–13)
NEUTS SEG # BLD: 4.7 K/UL (ref 1.8–8)
NEUTS SEG NFR BLD: 79 % (ref 32–75)
NRBC # BLD: 0 K/UL (ref 0–0.01)
NRBC BLD-RTO: 0 PER 100 WBC
P-R INTERVAL, ECG05: 168 MS
PLATELET # BLD AUTO: 218 K/UL (ref 150–400)
PMV BLD AUTO: 9.9 FL (ref 8.9–12.9)
POTASSIUM SERPL-SCNC: 4.1 MMOL/L (ref 3.5–5.1)
PROT SERPL-MCNC: 10.1 G/DL (ref 6.4–8.2)
Q-T INTERVAL, ECG07: 388 MS
QRS DURATION, ECG06: 100 MS
QTC CALCULATION (BEZET), ECG08: 508 MS
RBC # BLD AUTO: 4.14 M/UL (ref 3.8–5.2)
RBC MORPH BLD: ABNORMAL
SAMPLES BEING HELD,HOLD: NORMAL
SODIUM SERPL-SCNC: 136 MMOL/L (ref 136–145)
TROPONIN I SERPL-MCNC: <0.05 NG/ML
VENTRICULAR RATE, ECG03: 103 BPM
WBC # BLD AUTO: 5.9 K/UL (ref 3.6–11)

## 2019-07-31 PROCEDURE — 74011250637 HC RX REV CODE- 250/637: Performed by: INTERNAL MEDICINE

## 2019-07-31 PROCEDURE — 80053 COMPREHEN METABOLIC PANEL: CPT

## 2019-07-31 PROCEDURE — 85025 COMPLETE CBC W/AUTO DIFF WBC: CPT

## 2019-07-31 PROCEDURE — 74011000250 HC RX REV CODE- 250: Performed by: EMERGENCY MEDICINE

## 2019-07-31 PROCEDURE — 74176 CT ABD & PELVIS W/O CONTRAST: CPT

## 2019-07-31 PROCEDURE — 65660000001 HC RM ICU INTERMED STEPDOWN

## 2019-07-31 PROCEDURE — 96374 THER/PROPH/DIAG INJ IV PUSH: CPT

## 2019-07-31 PROCEDURE — 84484 ASSAY OF TROPONIN QUANT: CPT

## 2019-07-31 PROCEDURE — 96375 TX/PRO/DX INJ NEW DRUG ADDON: CPT

## 2019-07-31 PROCEDURE — 36415 COLL VENOUS BLD VENIPUNCTURE: CPT

## 2019-07-31 PROCEDURE — 94761 N-INVAS EAR/PLS OXIMETRY MLT: CPT

## 2019-07-31 PROCEDURE — 99285 EMERGENCY DEPT VISIT HI MDM: CPT

## 2019-07-31 PROCEDURE — 74011000250 HC RX REV CODE- 250: Performed by: INTERNAL MEDICINE

## 2019-07-31 PROCEDURE — 74011250636 HC RX REV CODE- 250/636: Performed by: EMERGENCY MEDICINE

## 2019-07-31 PROCEDURE — 87340 HEPATITIS B SURFACE AG IA: CPT

## 2019-07-31 PROCEDURE — 93005 ELECTROCARDIOGRAM TRACING: CPT

## 2019-07-31 PROCEDURE — 74011250636 HC RX REV CODE- 250/636: Performed by: INTERNAL MEDICINE

## 2019-07-31 PROCEDURE — C9113 INJ PANTOPRAZOLE SODIUM, VIA: HCPCS | Performed by: INTERNAL MEDICINE

## 2019-07-31 RX ORDER — MINOXIDIL 2.5 MG/1
2.5 TABLET ORAL DAILY
Status: DISCONTINUED | OUTPATIENT
Start: 2019-08-01 | End: 2019-08-14 | Stop reason: HOSPADM

## 2019-07-31 RX ORDER — ACETAMINOPHEN 500 MG
1000 TABLET ORAL
Status: DISCONTINUED | OUTPATIENT
Start: 2019-07-31 | End: 2019-08-14 | Stop reason: HOSPADM

## 2019-07-31 RX ORDER — OXYCODONE AND ACETAMINOPHEN 5; 325 MG/1; MG/1
1 TABLET ORAL
Status: DISCONTINUED | OUTPATIENT
Start: 2019-07-31 | End: 2019-08-12

## 2019-07-31 RX ORDER — AZATHIOPRINE 50 MG/1
50 TABLET ORAL
Status: DISCONTINUED | OUTPATIENT
Start: 2019-08-01 | End: 2019-08-14 | Stop reason: HOSPADM

## 2019-07-31 RX ORDER — SODIUM CHLORIDE 0.9 % (FLUSH) 0.9 %
5-40 SYRINGE (ML) INJECTION EVERY 8 HOURS
Status: DISCONTINUED | OUTPATIENT
Start: 2019-07-31 | End: 2019-08-14 | Stop reason: HOSPADM

## 2019-07-31 RX ORDER — LOSARTAN POTASSIUM 50 MG/1
100 TABLET ORAL DAILY
Status: DISCONTINUED | OUTPATIENT
Start: 2019-08-01 | End: 2019-08-14 | Stop reason: HOSPADM

## 2019-07-31 RX ORDER — MORPHINE SULFATE 2 MG/ML
4 INJECTION, SOLUTION INTRAMUSCULAR; INTRAVENOUS
Status: COMPLETED | OUTPATIENT
Start: 2019-07-31 | End: 2019-07-31

## 2019-07-31 RX ORDER — ONDANSETRON 2 MG/ML
4 INJECTION INTRAMUSCULAR; INTRAVENOUS ONCE
Status: COMPLETED | OUTPATIENT
Start: 2019-07-31 | End: 2019-07-31

## 2019-07-31 RX ORDER — PREDNISONE 10 MG/1
10 TABLET ORAL DAILY
Status: DISCONTINUED | OUTPATIENT
Start: 2019-08-01 | End: 2019-08-14 | Stop reason: HOSPADM

## 2019-07-31 RX ORDER — ONDANSETRON 2 MG/ML
4 INJECTION INTRAMUSCULAR; INTRAVENOUS
Status: DISCONTINUED | OUTPATIENT
Start: 2019-07-31 | End: 2019-08-14 | Stop reason: HOSPADM

## 2019-07-31 RX ORDER — DICYCLOMINE HYDROCHLORIDE 10 MG/ML
20 INJECTION INTRAMUSCULAR
Status: COMPLETED | OUTPATIENT
Start: 2019-07-31 | End: 2019-07-31

## 2019-07-31 RX ORDER — GEMFIBROZIL 600 MG/1
300 TABLET, FILM COATED ORAL DAILY
Status: DISCONTINUED | OUTPATIENT
Start: 2019-08-01 | End: 2019-08-14 | Stop reason: HOSPADM

## 2019-07-31 RX ORDER — IPRATROPIUM BROMIDE AND ALBUTEROL SULFATE 2.5; .5 MG/3ML; MG/3ML
3 SOLUTION RESPIRATORY (INHALATION)
Status: DISCONTINUED | OUTPATIENT
Start: 2019-07-31 | End: 2019-08-14 | Stop reason: HOSPADM

## 2019-07-31 RX ORDER — SODIUM CHLORIDE 9 MG/ML
50 INJECTION, SOLUTION INTRAVENOUS CONTINUOUS
Status: DISCONTINUED | OUTPATIENT
Start: 2019-07-31 | End: 2019-08-03

## 2019-07-31 RX ORDER — HYDROXYCHLOROQUINE SULFATE 200 MG/1
400 TABLET, FILM COATED ORAL
Status: DISCONTINUED | OUTPATIENT
Start: 2019-08-01 | End: 2019-08-14 | Stop reason: HOSPADM

## 2019-07-31 RX ORDER — SODIUM CHLORIDE 0.9 % (FLUSH) 0.9 %
5-40 SYRINGE (ML) INJECTION AS NEEDED
Status: DISCONTINUED | OUTPATIENT
Start: 2019-07-31 | End: 2019-08-14 | Stop reason: HOSPADM

## 2019-07-31 RX ORDER — AMLODIPINE BESYLATE 5 MG/1
10 TABLET ORAL DAILY
Status: DISCONTINUED | OUTPATIENT
Start: 2019-08-01 | End: 2019-08-14 | Stop reason: HOSPADM

## 2019-07-31 RX ORDER — HYDRALAZINE HYDROCHLORIDE 20 MG/ML
10 INJECTION INTRAMUSCULAR; INTRAVENOUS
Status: COMPLETED | OUTPATIENT
Start: 2019-07-31 | End: 2019-08-01

## 2019-07-31 RX ORDER — CARVEDILOL 12.5 MG/1
25 TABLET ORAL 2 TIMES DAILY WITH MEALS
Status: DISCONTINUED | OUTPATIENT
Start: 2019-08-01 | End: 2019-08-14 | Stop reason: HOSPADM

## 2019-07-31 RX ORDER — DICYCLOMINE HYDROCHLORIDE 20 MG/1
20 TABLET ORAL EVERY 6 HOURS
Status: DISCONTINUED | OUTPATIENT
Start: 2019-08-01 | End: 2019-08-14 | Stop reason: HOSPADM

## 2019-07-31 RX ORDER — FACIAL-BODY WIPES
10 EACH TOPICAL DAILY PRN
Status: DISCONTINUED | OUTPATIENT
Start: 2019-07-31 | End: 2019-08-14 | Stop reason: HOSPADM

## 2019-07-31 RX ADMIN — ONDANSETRON 4 MG: 2 INJECTION INTRAMUSCULAR; INTRAVENOUS at 14:02

## 2019-07-31 RX ADMIN — SODIUM CHLORIDE 10 ML: 9 INJECTION INTRAMUSCULAR; INTRAVENOUS; SUBCUTANEOUS at 22:12

## 2019-07-31 RX ADMIN — DICYCLOMINE HYDROCHLORIDE 20 MG: 20 INJECTION, SOLUTION INTRAMUSCULAR at 20:36

## 2019-07-31 RX ADMIN — FAMOTIDINE 20 MG: 10 INJECTION, SOLUTION INTRAVENOUS at 14:02

## 2019-07-31 RX ADMIN — SODIUM CHLORIDE 500 ML: 900 INJECTION, SOLUTION INTRAVENOUS at 14:02

## 2019-07-31 RX ADMIN — SODIUM CHLORIDE 100 ML/HR: 900 INJECTION, SOLUTION INTRAVENOUS at 22:12

## 2019-07-31 RX ADMIN — OXYCODONE AND ACETAMINOPHEN 1 TABLET: 5; 325 TABLET ORAL at 22:10

## 2019-07-31 RX ADMIN — HYDRALAZINE HYDROCHLORIDE 10 MG: 20 INJECTION INTRAMUSCULAR; INTRAVENOUS at 20:46

## 2019-07-31 RX ADMIN — AMITRIPTYLINE HYDROCHLORIDE 75 MG: 50 TABLET, FILM COATED ORAL at 22:33

## 2019-07-31 RX ADMIN — SODIUM CHLORIDE 40 MG: 9 INJECTION INTRAMUSCULAR; INTRAVENOUS; SUBCUTANEOUS at 20:35

## 2019-07-31 RX ADMIN — MORPHINE SULFATE 4 MG: 2 INJECTION, SOLUTION INTRAMUSCULAR; INTRAVENOUS at 14:02

## 2019-07-31 NOTE — PROGRESS NOTES
Admission Medication Reconciliation:    Information obtained from:  patient, chart review, insurance claim information    Comments/Recommendations: All medications/allergies have been reviewed and updated; last medication administration times reviewed and recorded. The patient reports that she has not taken any of her medications today. Insurance claim information was used to confirm current medications. Note- during hospitalization from April 21, 2019; patient was instructed to stop taking Eliquis; however, patient continued to have medication filled for May, June, and July. The patient reports she does not know if she is still taking medications because all of her medication come pre-pack. Changes made to Prior to Admission (PTA) Medication List:   ?   Medications Added:   - None   ? Medications Changed:   - ferric citrate changed from 210 mg TID to 420 mg TID  -losartan changed to 100 mg daily from 200 mg daily; based on insurance claim information and historical medication in-hospital  ?   Medications Removed:   - Probiotic       Significant PMH/Disease States:   Past Medical History:   Diagnosis Date    Anemia     secondary to lupus    Asthma     no inhaler use in past 2 to 3 years    Carditis     Chronic kidney disease     ESRD    Chronic pain     DDD (degenerative disc disease), lumbar     ESRD (end stage renal disease) ([facility] [address]     GERD (gastroesophageal reflux disease)     Hemodialysis patient ([facility] [address] 12/21/2017    [address]  Tuesday,  Thursday,  and Saturday. Hypercholesterolemia     Hypertension     Intractable nausea and vomiting 10/21/2015    Long term (current) use of anticoagulants     Lupus (systemic lupus erythematosus) (HCC)     Malignant hypertension with chronic kidney disease stage V ([facility] [address]     Peritoneal dialysis status ([facility] [address] 10/2015    x 2 years Stopped 12/2017 due to infection and removed.     Poor historian 01/17/2018    With medications    Thromboembolus ([facility] [address] 2013    lungs    Transfusion history     Last Transfusion 12/21/2017  at [facility]       Chief Complaint for this Admission:    Chief Complaint   Patient presents with    Vomiting    Abdominal Pain       Allergies:  Compazine [prochlorperazine edisylate]    Prior to Admission Medications:   Prior to Admission Medications   Prescriptions Last Dose Informant Patient Reported? Taking? L.acidoph & parac-S.therm-Bifido (AJIT Q2/RISAQUAD-2) 16 billion cell cap cap   No No   Sig: Take 1 Cap by mouth daily. acetaminophen (TYLENOL EXTRA STRENGTH) 500 mg tablet   No No   Sig: Take 2 Tabs by mouth every six (6) hours as needed for Pain. albuterol (PROVENTIL HFA, VENTOLIN HFA, PROAIR HFA) 90 mcg/actuation inhaler  Self No No   Sig: Take 1-2 Puffs by inhalation every four (4) hours as needed for Wheezing or Shortness of Breath. amLODIPine (NORVASC) 10 mg tablet   No No   Sig: Take 1 Tab by mouth daily. amitriptyline (ELAVIL) 75 mg tablet   No No   Sig: Take 1 Tab by mouth nightly for 90 days. For sleep difficulty   azaTHIOprine (IMURAN) 50 mg tablet  Self Yes No   Sig: Take 50 mg by mouth daily (after breakfast). bismuth subsalicylate (PEPTO-BISMOL) 262 mg/15 mL suspension   No No   Sig: Take 30 mL by mouth every four (4) hours as needed for Indigestion (diarrhea). May take 30 mL every 30 minutes as needed for up to 8 doses initially. carvedilol (COREG) 25 mg tablet   No No   Sig: Take 1 Tab by mouth two (2) times daily (with meals). dicyclomine (BENTYL) 20 mg tablet   No No   Sig: Take 1 Tab by mouth every six (6) hours for 20 doses. ferric citrate (AURYXIA) 210 mg iron tablet   Yes No   Sig: Take 420 mg by mouth three (3) times daily (with meals). gemfibrozil (LOPID) 600 mg tablet  Self Yes No   Sig: Take 300 mg by mouth daily. hydroxychloroquine (PLAQUENIL) 200 mg tablet   No No   Sig: TAKE 2 TABLETS BY MOUTH DAILY   losartan (COZAAR) 100 mg tablet   Yes No   Sig: Take 100 mg by mouth daily.    minoxidil (LONITEN) 2.5 mg tablet   Yes No   Sig: Take 2.5 mg by mouth daily. ondansetron (ZOFRAN ODT) 8 mg disintegrating tablet   No No   Sig: Take 1 Tab by mouth every eight (8) hours as needed for Nausea. oxyCODONE-acetaminophen (PERCOCET) 5-325 mg per tablet   No No   Sig: Take 1 Tab by mouth two (2) times daily as needed for Pain (severe back pain) for up to 30 days. Max Daily Amount: 2 Tabs. pantoprazole (PROTONIX) 40 mg tablet  Self No No   Sig: Take 1 Tab by mouth Daily (before breakfast). polyethylene glycol (MIRALAX) 17 gram/dose powder   No No   Sig: Put 8 capfuls of Miralax in a 32 ounce beverage and drink it, followed by 3 capfuls twice daily for the next week and follow up with your primary care physician   predniSONE (DELTASONE) 5 mg tablet   No No   Sig: Take 2 Tabs by mouth daily. senna (SENNA) 8.6 mg tablet   Yes No   Sig: Take 1 Tab by mouth daily as needed for Constipation. for constipation      Facility-Administered Medications: None     Thank you for allowing pharmacy to participate in the coordination of this patient's care. If you have any other questions, please contact the medication reconciliation pharmacist at [phone]. Nader Goddard, Pharm. D., [facility]

## 2019-07-31 NOTE — ED PROVIDER NOTES
36 y.o. female with past medical history significant for anemia, carditis, high cholesterol, chronic pain, lupus, asthma, hemodialysis pt, ESRD, DDD, thromboembolus, GERD, and HTN who presents from home via EMS with chief complaint of vomiting. Pt reports onset \"4 days ago\" of abdominal pain accompanied by constipation. Pt reports she was seen \"3 times\" last week with the same complaint. Pt reports she was seen \"3 days ago\" at [facility] and had an XR of abdomen which showed \"more constipation\". Pt reports she was given an enema and had a BM. Pt denies any BM since then and states she has been taking sorbitol with no relief. Pt reports she began vomiting earlier today and states she \"hasn't eaten anything\". Pt denies taking any medications for vomiting, but endorses taking oxycodone 5 mg daily. Pt reports history of abdominal abscess and lupus. Pt reports she receives dialysis treatments T/TH/S, with last treatment being yesterday. Pt denies any fevers or hematemesis. There are no other acute medical concerns at this time. Old Chart Review:  Pt was seen in ED on 7/27 for abdominal pain. CT of abdomen was unremarkable. Pt was given morphine, zofran, and was discharged on bentyl. Pt was recommended to f/u with PCP. Pt was seen again on 7/28 for same complaint and was given lactulose, magnesium citrate, and dulcolax. Pt had small BM while in ED. Pt was advised to take miralax 4x day for 2 days and to f/u with PCP. Pt was again yesterday and had XR of abdomen done which showed mild to moderate stool burden. Social hx: Non smoker; No EtOH use; Drug use (OTC/prescription)    PCP: Licha Medrano NP    Note written by Boy Haider, as dictated by Evelyne Boyer MD 1:53 PM       The history is provided by the patient and medical records. No  was used.         Past Medical History:   Diagnosis Date    Anemia     secondary to lupus    Asthma     no inhaler use in past 2 to 3 years  Carditis     Chronic kidney disease     ESRD    Chronic pain     DDD (degenerative disc disease), lumbar     ESRD (end stage renal disease) (HCC)     GERD (gastroesophageal reflux disease)     Hemodialysis patient ([facility] [address] 2017    [address]  Tuesday,  Thursday,  and Saturday. 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 [address]     Peritoneal dialysis status ([facility] [address] 10/2015    x 2 years Stopped 2017 due to infection and removed.  Poor historian 2018    With medications    Thromboembolus ([facility] [address] 2013    lungs    Transfusion history     Last Transfusion 2017  at [facility]       Past Surgical History:   Procedure Laterality Date    HX  SECTION  11/2006    x1    HX OTHER SURGICAL  9/16/15    INSERTION PD CATH;  Removed 2017    HX VASCULAR ACCESS Right 2017    Double-Lumen henry catheter upper chest    HX VASCULAR ACCESS Right 2018    right upper arm         Family History:   Problem Relation Age of Onset    Diabetes Father     Hypertension Father     Cancer Other         aunt with breast cancer    Diabetes Mother        Social History     Socioeconomic History    Marital status: SINGLE     Spouse name: Not on file    Number of children: Not on file    Years of education: Not on file    Highest education level: Not on file   Occupational History    Not on file   Social Needs    Financial resource strain: Not on file    Food insecurity:     Worry: Not on file     Inability: Not on file    Transportation needs:     Medical: Not on file     Non-medical: Not on file   Tobacco Use    Smoking status: Never Smoker    Smokeless tobacco: Never Used   Substance and Sexual Activity    Alcohol use: No    Drug use: Yes     Types: Prescription, OTC    Sexual activity: Not Currently   Lifestyle    Physical activity:     Days per week: Not on file     Minutes per session: Not on file    Stress: Not on file   Relationships    Social connections:     Talks on phone: Not on file     Gets together: Not on file     Attends Synagogue service: Not on file     Active member of club or organization: Not on file     Attends meetings of clubs or organizations: Not on file     Relationship status: Not on file    Intimate partner violence:     Fear of current or ex partner: Not on file     Emotionally abused: Not on file     Physically abused: Not on file     Forced sexual activity: Not on file   Other Topics Concern     Service No    Blood Transfusions No    Caffeine Concern No    Occupational Exposure No    Hobby Hazards No    Sleep Concern No    Stress Concern No    Weight Concern No    Special Diet No    Back Care Yes     Comment: low back pain    Exercise No    Bike Helmet No    Seat Belt Yes    Self-Exams No   Social History Narrative    Lives with parents and daughter. ALLERGIES: Compazine [prochlorperazine edisylate]    Review of Systems   Constitutional: Negative for fever. HENT: Negative for facial swelling. Eyes: Negative for visual disturbance. Respiratory: Negative for chest tightness. Cardiovascular: Negative for chest pain. Gastrointestinal: Positive for abdominal pain, constipation, nausea and vomiting. Genitourinary: Negative for difficulty urinating and dysuria. Musculoskeletal: Negative for arthralgias. Skin: Negative for rash. Neurological: Negative for headaches. Hematological: Negative for adenopathy. Psychiatric/Behavioral: Negative for suicidal ideas. All other systems reviewed and are negative. Vitals:    07/31/19 1312   BP: (!) 179/119   Pulse: 100   Resp: 15   Temp: 98.8 °F (37.1 °C)   SpO2: 99%            Physical Exam   Constitutional: She is oriented to person, place, and time.  She appears well-developed. No distress. Pt is obese and dry heaving. HENT:   Head: Normocephalic and atraumatic. Mouth/Throat: Oropharynx is clear and moist.   Eyes: Pupils are equal, round, and reactive to light. No scleral icterus. Neck: Normal range of motion. Neck supple. No thyromegaly present. Cardiovascular: Normal rate, regular rhythm, normal heart sounds and intact distal pulses. No murmur heard. Pulmonary/Chest: Effort normal and breath sounds normal. No respiratory distress. Abdominal: Soft. She exhibits no distension. Bowel sounds are decreased. There is no tenderness. No focal tenderness noted. Musculoskeletal: Normal range of motion. She exhibits no edema. Neurological: She is alert and oriented to person, place, and time. Skin: Skin is warm and dry. No rash noted. She is not diaphoretic. Nursing note and vitals reviewed. Note written by Boy Ortez, as dictated by Jasen Evans MD 1:53 PM     MDM  Number of Diagnoses or Management Options  ESRD on hemodialysis [facility]):   Lupus [facility]):   Partial small bowel obstruction ([address]):   Vomiting, intractability of vomiting not specified, presence of nausea not specified, unspecified vomiting type:          Procedures  CONSULT NOTE:  5:14 PM Jasen Evans MD spoke with Dr. Nathaniel Ramos for Surgery. Discussed available diagnostic tests and clinical findings. Dr. Naomi Bailey will see the pt. Hospitalist Wilbert for Admission  6:51 PM    ED Room Number: ER20/20  Patient Name and age:  Laney Washington 36 y.o.  female  Working Diagnosis:   1. Partial small bowel obstruction ([address]    2. Vomiting, intractability of vomiting not specified, presence of nausea not specified, unspecified vomiting type    3. ESRD on hemodialysis ([address]    4. Lupus ([address]      Readmission: no  Isolation Requirements:  no  Recommended Level of Care:  telemetry  Code Status:  Full Code  Department:[facility] Adult ED - 21   Other:  ESRD on HD.   Ileus vs early partial SBO. Seen by Narayan Lisa who rec'd conservative management. He will follow.

## 2019-07-31 NOTE — CONSULTS
Chief Complaint:  Possible bowel obstruction    HPI:  37 yo woman with hx ESRD on peritoneal dialysis which got infected with subacute bacterial peritonitis and was switched to hemodialysis, PE, lupus, GERD, HTN consulted for possible small bowel obstruction. Pt reported nausea and vomiting for past 4 days, she has had workup with no significant findings but had CT scan today which showed ileus versus early partial small bowel obstruction. No fever or chills. She does have vomitus that looked somewhat bloody. No leukocytosis. Pt reported passing flatus and having BM today. Past Medical History:   Diagnosis Date    Anemia     secondary to lupus    Asthma     no inhaler use in past 2 to 3 years    Carditis     Chronic kidney disease     ESRD    Chronic pain     DDD (degenerative disc disease), lumbar     ESRD (end stage renal disease) (HCC)     GERD (gastroesophageal reflux disease)     Hemodialysis patient ([facility] [address] 2017    [address]  Tuesday,  Thursday,  and Saturday. 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  Poor historian 2018    With medications    Thromboembolus ([facility] [address] 2013    lungs    Transfusion history     Last Transfusion 2017  at [facility]       Past Surgical History:   Procedure Laterality Date    HX  SECTION  11/2006    x1    HX OTHER SURGICAL  9/16/15    INSERTION PD CATH; Removed 2017    HX VASCULAR ACCESS Right 2017    Double-Lumen henry catheter upper chest    HX VASCULAR ACCESS Right 2018    right upper arm       No current facility-administered medications on file prior to encounter.       Current Outpatient Medications on File Prior to Encounter   Medication Sig Dispense Refill    dicyclomine (BENTYL) 20 mg tablet Take 1 Tab by mouth every six (6) hours for 20 doses. 20 Tab 0    ondansetron (ZOFRAN ODT) 8 mg disintegrating tablet Take 1 Tab by mouth every eight (8) hours as needed for Nausea. 15 Tab 0    oxyCODONE-acetaminophen (PERCOCET) 5-325 mg per tablet Take 1 Tab by mouth two (2) times daily as needed for Pain (severe back pain) for up to 30 days. Max Daily Amount: 2 Tabs. 30 Tab 0    amitriptyline (ELAVIL) 75 mg tablet Take 1 Tab by mouth nightly for 90 days. For sleep difficulty 90 Tab 0    L.acidoph & parac-S.therm-Bifido (AJIT Q2/RISAQUAD-2) 16 billion cell cap cap Take 1 Cap by mouth daily. 10 Cap 0    polyethylene glycol (MIRALAX) 17 gram/dose powder Put 8 capfuls of Miralax in a 32 ounce beverage and drink it, followed by 3 capfuls twice daily for the next week and follow up with your primary care physician 500 g 0    bismuth subsalicylate (PEPTO-BISMOL) 262 mg/15 mL suspension Take 30 mL by mouth every four (4) hours as needed for Indigestion (diarrhea). May take 30 mL every 30 minutes as needed for up to 8 doses initially. 354 mL 0    minoxidil (LONITEN) 2.5 mg tablet Take 2.5 mg by mouth daily.  losartan (COZAAR) 100 mg tablet Take 100 mg by mouth daily.  hydroxychloroquine (PLAQUENIL) 200 mg tablet TAKE 2 TABLETS BY MOUTH DAILY 180 Tab 6    acetaminophen (TYLENOL EXTRA STRENGTH) 500 mg tablet Take 2 Tabs by mouth every six (6) hours as needed for Pain. 30 Tab 0    ferric citrate (AURYXIA) 210 mg iron tablet Take 420 mg by mouth three (3) times daily (with meals).  carvedilol (COREG) 25 mg tablet Take 1 Tab by mouth two (2) times daily (with meals). 60 Tab 0    amLODIPine (NORVASC) 10 mg tablet Take 1 Tab by mouth daily. 30 Tab 0    predniSONE (DELTASONE) 5 mg tablet Take 2 Tabs by mouth daily. 30 Tab 0    senna (SENNA) 8.6 mg tablet Take 1 Tab by mouth daily as needed for Constipation. for constipation      gemfibrozil (LOPID) 600 mg tablet Take 300 mg by mouth daily.  azaTHIOprine (IMURAN) 50 mg tablet Take 50 mg by mouth daily (after breakfast).  albuterol (PROVENTIL HFA, VENTOLIN HFA, PROAIR HFA) 90 mcg/actuation inhaler Take 1-2 Puffs by inhalation every four (4) hours as needed for Wheezing or Shortness of Breath. 1 Inhaler 2    pantoprazole (PROTONIX) 40 mg tablet Take 1 Tab by mouth Daily (before breakfast). 30 Tab 0     No current facility-administered medications on file prior to encounter. Current Outpatient Medications on File Prior to Encounter   Medication Sig Dispense Refill    dicyclomine (BENTYL) 20 mg tablet Take 1 Tab by mouth every six (6) hours for 20 doses. 20 Tab 0    ondansetron (ZOFRAN ODT) 8 mg disintegrating tablet Take 1 Tab by mouth every eight (8) hours as needed for Nausea. 15 Tab 0    oxyCODONE-acetaminophen (PERCOCET) 5-325 mg per tablet Take 1 Tab by mouth two (2) times daily as needed for Pain (severe back pain) for up to 30 days. Max Daily Amount: 2 Tabs. 30 Tab 0    amitriptyline (ELAVIL) 75 mg tablet Take 1 Tab by mouth nightly for 90 days. For sleep difficulty 90 Tab 0    L.acidoph & parac-S.therm-Bifido (AJIT Q2/RISAQUAD-2) 16 billion cell cap cap Take 1 Cap by mouth daily. 10 Cap 0    polyethylene glycol (MIRALAX) 17 gram/dose powder Put 8 capfuls of Miralax in a 32 ounce beverage and drink it, followed by 3 capfuls twice daily for the next week and follow up with your primary care physician 500 g 0    bismuth subsalicylate (PEPTO-BISMOL) 262 mg/15 mL suspension Take 30 mL by mouth every four (4) hours as needed for Indigestion (diarrhea). May take 30 mL every 30 minutes as needed for up to 8 doses initially. 354 mL 0    minoxidil (LONITEN) 2.5 mg tablet Take 2.5 mg by mouth daily.  losartan (COZAAR) 100 mg tablet Take 100 mg by mouth daily.       hydroxychloroquine (PLAQUENIL) 200 mg tablet TAKE 2 TABLETS BY MOUTH DAILY 180 Tab 6    acetaminophen (TYLENOL EXTRA STRENGTH) 500 mg tablet Take 2 Tabs by mouth every six (6) hours as needed for Pain. 30 Tab 0    ferric citrate (AURYXIA) 210 mg iron tablet Take 420 mg by mouth three (3) times daily (with meals).  carvedilol (COREG) 25 mg tablet Take 1 Tab by mouth two (2) times daily (with meals). 60 Tab 0    amLODIPine (NORVASC) 10 mg tablet Take 1 Tab by mouth daily. 30 Tab 0    predniSONE (DELTASONE) 5 mg tablet Take 2 Tabs by mouth daily. 30 Tab 0    senna (SENNA) 8.6 mg tablet Take 1 Tab by mouth daily as needed for Constipation. for constipation      gemfibrozil (LOPID) 600 mg tablet Take 300 mg by mouth daily.  azaTHIOprine (IMURAN) 50 mg tablet Take 50 mg by mouth daily (after breakfast).  albuterol (PROVENTIL HFA, VENTOLIN HFA, PROAIR HFA) 90 mcg/actuation inhaler Take 1-2 Puffs by inhalation every four (4) hours as needed for Wheezing or Shortness of Breath. 1 Inhaler 2    pantoprazole (PROTONIX) 40 mg tablet Take 1 Tab by mouth Daily (before breakfast). 30 Tab 0       Allergies   Allergen Reactions    Compazine [Prochlorperazine Edisylate] Nausea and Vomiting and Palpitations     \"hot and lightheaded\"       Review of Systems - General ROS: negative  Psychological ROS: negative  Respiratory ROS: negative  Cardiovascular ROS: negative  Gastrointestinal ROS: positive for - abdominal pain and nausea/vomiting    Visit Vitals  BP (!) 197/124   Pulse (!) 108   Temp 98.8 °F (37.1 °C)   Resp 22   SpO2 93%         Physical Exam:    Gen:  NAD  Pulm:  Unlabored  Abd:  S/moderately distended/moderate TTP in left abdomen without guarding or rebound    Recent Results (from the past 24 hour(s))   SAMPLES BEING HELD    Collection Time: 07/31/19  1:38 PM   Result Value Ref Range    SAMPLES BEING HELD 1 RED 1 BLUE     COMMENT        Add-on orders for these samples will be processed based on acceptable specimen integrity and analyte stability, which may vary by analyte. TROPONIN I    Collection Time: 07/31/19  1:38 PM   Result Value Ref Range    Troponin-I, Qt. <0.05 <0.05 ng/mL   CBC WITH AUTOMATED DIFF    Collection Time: 07/31/19  1:38 PM   Result Value Ref Range    WBC 5.9 3.6 - 11.0 K/uL    RBC 4.14 3.80 - 5.20 M/uL    HGB 11.7 11.5 - 16.0 g/dL    HCT 38.9 35.0 - 47.0 %    MCV 94.0 80.0 - 99.0 FL    MCH 28.3 26.0 - 34.0 PG    MCHC 30.1 30.0 - 36.5 g/dL    RDW 14.7 (H) 11.5 - 14.5 %    PLATELET 186 837 - 632 K/uL    MPV 9.9 8.9 - 12.9 FL    NRBC 0.0 0  WBC    ABSOLUTE NRBC 0.00 0.00 - 0.01 K/uL    NEUTROPHILS 79 (H) 32 - 75 %    LYMPHOCYTES 11 (L) 12 - 49 %    MONOCYTES 9 5 - 13 %    EOSINOPHILS 0 0 - 7 %    BASOPHILS 0 0 - 1 %    IMMATURE GRANULOCYTES 1 (H) 0.0 - 0.5 %    ABS. NEUTROPHILS 4.7 1.8 - 8.0 K/UL    ABS. LYMPHOCYTES 0.6 (L) 0.8 - 3.5 K/UL    ABS. MONOCYTES 0.5 0.0 - 1.0 K/UL    ABS. EOSINOPHILS 0.0 0.0 - 0.4 K/UL    ABS. BASOPHILS 0.0 0.0 - 0.1 K/UL    ABS. IMM. GRANS. 0.1 (H) 0.00 - 0.04 K/UL    DF SMEAR SCANNED      RBC COMMENTS NORMOCYTIC, NORMOCHROMIC     METABOLIC PANEL, COMPREHENSIVE    Collection Time: 07/31/19  1:38 PM   Result Value Ref Range    Sodium 136 136 - 145 mmol/L    Potassium 4.1 3.5 - 5.1 mmol/L    Chloride 92 (L) 97 - 108 mmol/L    CO2 28 21 - 32 mmol/L    Anion gap 16 (H) 5 - 15 mmol/L    Glucose 137 (H) 65 - 100 mg/dL    BUN 36 (H) 6 - 20 MG/DL    Creatinine 8.85 (H) 0.55 - 1.02 MG/DL    BUN/Creatinine ratio 4 (L) 12 - 20      GFR est AA 6 (L) >60 ml/min/1.73m2    GFR est non-AA 5 (L) >60 ml/min/1.73m2    Calcium 10.2 (H) 8.5 - 10.1 MG/DL    Bilirubin, total 0.4 0.2 - 1.0 MG/DL    ALT (SGPT) 14 12 - 78 U/L    AST (SGOT) 21 15 - 37 U/L    Alk.  phosphatase 83 45 - 117 U/L    Protein, total 10.1 (H) 6.4 - 8.2 g/dL    Albumin 3.8 3.5 - 5.0 g/dL    Globulin 6.3 (H) 2.0 - 4.0 g/dL    A-G Ratio 0.6 (L) 1.1 - 2.2     EKG, 12 LEAD, INITIAL    Collection Time: 07/31/19  2:18 PM   Result Value Ref Range    Ventricular Rate 103 BPM    Atrial Rate 103 BPM    P-R Interval 168 ms    QRS Duration 100 ms    Q-T Interval 388 ms    QTC Calculation (Bezet) 508 ms    Calculated P Axis 65 degrees    Calculated R Axis 72 degrees    Calculated T Axis 46 degrees    Diagnosis       Sinus tachycardia  Left ventricular hypertrophy  Left atrial enlargement  Prolonged QT  When compared with ECG of 15-JUL-2019 17:33,  QT has lengthened  Confirmed by Óscar Christianson M.D., Gerardo Albert ([id number]) on 7/31/2019 2:50:39 PM         AP:  37 yo woman with ileus versus early partial small bowel obstruction    - Abdominal pain:  CT scan suggested ileus versus partial SBO. Pt is passing flatus and having BM.   No acute indication for operation  - Recommend admission to medical service for control HTN and other medical issues  - NPO with ice chips for now  - Recommend PPI for possible gastritis/MW tear  - NG tube placement if nausea and vomiting worsened  - Repeat AXR in am  - Labs in am

## 2019-07-31 NOTE — ED TRIAGE NOTES
Patient arrives from home actively vomiting. She was seen at [facility] last night, diagnosed with constipation and sent home. Today, she reports severe abd tenderness and vomiting large amounts of GI contents. Dialysis T.R. Saturday.

## 2019-07-31 NOTE — ED NOTES
Pt ambulated to bedside commode and passed very large bowel movement, scant blood noted on wipes. Provider notified.

## 2019-08-01 LAB
ABO + RH BLD: NORMAL
ANION GAP SERPL CALC-SCNC: 12 MMOL/L (ref 5–15)
BLOOD GROUP ANTIBODIES SERPL: NORMAL
BUN SERPL-MCNC: 44 MG/DL (ref 6–20)
BUN/CREAT SERPL: 5 (ref 12–20)
CALCIUM SERPL-MCNC: 9.2 MG/DL (ref 8.5–10.1)
CHLORIDE SERPL-SCNC: 96 MMOL/L (ref 97–108)
CO2 SERPL-SCNC: 27 MMOL/L (ref 21–32)
CREAT SERPL-MCNC: 9.22 MG/DL (ref 0.55–1.02)
ERYTHROCYTE [DISTWIDTH] IN BLOOD BY AUTOMATED COUNT: 14.5 % (ref 11.5–14.5)
GLUCOSE SERPL-MCNC: 99 MG/DL (ref 65–100)
HCT VFR BLD AUTO: 32 % (ref 35–47)
HGB BLD-MCNC: 9.9 G/DL (ref 11.5–16)
MCH RBC QN AUTO: 28.7 PG (ref 26–34)
MCHC RBC AUTO-ENTMCNC: 30.9 G/DL (ref 30–36.5)
MCV RBC AUTO: 92.8 FL (ref 80–99)
NRBC # BLD: 0 K/UL (ref 0–0.01)
NRBC BLD-RTO: 0 PER 100 WBC
PLATELET # BLD AUTO: 183 K/UL (ref 150–400)
PMV BLD AUTO: 9.7 FL (ref 8.9–12.9)
POTASSIUM SERPL-SCNC: 5 MMOL/L (ref 3.5–5.1)
RBC # BLD AUTO: 3.45 M/UL (ref 3.8–5.2)
SODIUM SERPL-SCNC: 135 MMOL/L (ref 136–145)
SPECIMEN EXP DATE BLD: NORMAL
WBC # BLD AUTO: 5 K/UL (ref 3.6–11)

## 2019-08-01 PROCEDURE — 85027 COMPLETE CBC AUTOMATED: CPT

## 2019-08-01 PROCEDURE — 74011250636 HC RX REV CODE- 250/636: Performed by: NURSE PRACTITIONER

## 2019-08-01 PROCEDURE — 65270000029 HC RM PRIVATE

## 2019-08-01 PROCEDURE — 74011250637 HC RX REV CODE- 250/637: Performed by: HOSPITALIST

## 2019-08-01 PROCEDURE — C9113 INJ PANTOPRAZOLE SODIUM, VIA: HCPCS | Performed by: INTERNAL MEDICINE

## 2019-08-01 PROCEDURE — 74011250637 HC RX REV CODE- 250/637: Performed by: INTERNAL MEDICINE

## 2019-08-01 PROCEDURE — 74011636637 HC RX REV CODE- 636/637: Performed by: INTERNAL MEDICINE

## 2019-08-01 PROCEDURE — 36415 COLL VENOUS BLD VENIPUNCTURE: CPT

## 2019-08-01 PROCEDURE — 5A1D70Z PERFORMANCE OF URINARY FILTRATION, INTERMITTENT, LESS THAN 6 HOURS PER DAY: ICD-10-PCS | Performed by: INTERNAL MEDICINE

## 2019-08-01 PROCEDURE — 80048 BASIC METABOLIC PNL TOTAL CA: CPT

## 2019-08-01 PROCEDURE — 74011000250 HC RX REV CODE- 250: Performed by: INTERNAL MEDICINE

## 2019-08-01 PROCEDURE — 90935 HEMODIALYSIS ONE EVALUATION: CPT

## 2019-08-01 PROCEDURE — 86900 BLOOD TYPING SEROLOGIC ABO: CPT

## 2019-08-01 PROCEDURE — 74011250636 HC RX REV CODE- 250/636: Performed by: INTERNAL MEDICINE

## 2019-08-01 RX ORDER — MORPHINE SULFATE 2 MG/ML
1-2 INJECTION, SOLUTION INTRAMUSCULAR; INTRAVENOUS
Status: DISCONTINUED | OUTPATIENT
Start: 2019-08-01 | End: 2019-08-14 | Stop reason: HOSPADM

## 2019-08-01 RX ORDER — AMOXICILLIN 250 MG
2 CAPSULE ORAL DAILY
Status: DISCONTINUED | OUTPATIENT
Start: 2019-08-01 | End: 2019-08-14 | Stop reason: HOSPADM

## 2019-08-01 RX ORDER — CLONIDINE HYDROCHLORIDE 0.1 MG/1
0.1 TABLET ORAL
Status: DISCONTINUED | OUTPATIENT
Start: 2019-08-01 | End: 2019-08-14 | Stop reason: HOSPADM

## 2019-08-01 RX ADMIN — MINOXIDIL 2.5 MG: 2.5 TABLET ORAL at 09:55

## 2019-08-01 RX ADMIN — SODIUM CHLORIDE 10 ML: 9 INJECTION INTRAMUSCULAR; INTRAVENOUS; SUBCUTANEOUS at 22:38

## 2019-08-01 RX ADMIN — OXYCODONE AND ACETAMINOPHEN 1 TABLET: 5; 325 TABLET ORAL at 18:03

## 2019-08-01 RX ADMIN — SODIUM CHLORIDE 100 ML/HR: 900 INJECTION, SOLUTION INTRAVENOUS at 08:54

## 2019-08-01 RX ADMIN — HYDRALAZINE HYDROCHLORIDE 10 MG: 20 INJECTION INTRAMUSCULAR; INTRAVENOUS at 03:41

## 2019-08-01 RX ADMIN — GEMFIBROZIL 300 MG: 600 TABLET ORAL at 09:56

## 2019-08-01 RX ADMIN — MORPHINE SULFATE 1 MG: 2 INJECTION, SOLUTION INTRAMUSCULAR; INTRAVENOUS at 20:30

## 2019-08-01 RX ADMIN — AMITRIPTYLINE HYDROCHLORIDE 75 MG: 50 TABLET, FILM COATED ORAL at 22:35

## 2019-08-01 RX ADMIN — LACTULOSE 30 ML: 20 SOLUTION ORAL at 20:30

## 2019-08-01 RX ADMIN — AZATHIOPRINE 50 MG: 50 TABLET ORAL at 09:57

## 2019-08-01 RX ADMIN — DICYCLOMINE HYDROCHLORIDE 20 MG: 20 TABLET ORAL at 20:30

## 2019-08-01 RX ADMIN — HYDROXYCHLOROQUINE SULFATE 400 MG: 200 TABLET, FILM COATED ORAL at 09:55

## 2019-08-01 RX ADMIN — SODIUM CHLORIDE 40 MG: 9 INJECTION INTRAMUSCULAR; INTRAVENOUS; SUBCUTANEOUS at 22:35

## 2019-08-01 RX ADMIN — LOSARTAN POTASSIUM 100 MG: 50 TABLET ORAL at 09:56

## 2019-08-01 RX ADMIN — SENNOSIDES, DOCUSATE SODIUM 2 TABLET: 50; 8.6 TABLET, FILM COATED ORAL at 18:03

## 2019-08-01 RX ADMIN — SODIUM CHLORIDE 40 MG: 9 INJECTION INTRAMUSCULAR; INTRAVENOUS; SUBCUTANEOUS at 08:54

## 2019-08-01 RX ADMIN — MORPHINE SULFATE 1 MG: 2 INJECTION, SOLUTION INTRAMUSCULAR; INTRAVENOUS at 13:22

## 2019-08-01 RX ADMIN — MORPHINE SULFATE 2 MG: 2 INJECTION, SOLUTION INTRAMUSCULAR; INTRAVENOUS at 00:24

## 2019-08-01 RX ADMIN — SODIUM CHLORIDE 10 ML: 9 INJECTION INTRAMUSCULAR; INTRAVENOUS; SUBCUTANEOUS at 05:30

## 2019-08-01 RX ADMIN — SODIUM CHLORIDE 10 ML: 9 INJECTION INTRAMUSCULAR; INTRAVENOUS; SUBCUTANEOUS at 13:25

## 2019-08-01 RX ADMIN — DICYCLOMINE HYDROCHLORIDE 20 MG: 20 TABLET ORAL at 09:56

## 2019-08-01 RX ADMIN — SODIUM CHLORIDE 50 ML/HR: 900 INJECTION, SOLUTION INTRAVENOUS at 20:38

## 2019-08-01 RX ADMIN — MORPHINE SULFATE 2 MG: 2 INJECTION, SOLUTION INTRAMUSCULAR; INTRAVENOUS at 08:54

## 2019-08-01 RX ADMIN — FERRIC CITRATE 420 MG: 210 TABLET, COATED ORAL at 09:56

## 2019-08-01 RX ADMIN — AMLODIPINE BESYLATE 10 MG: 5 TABLET ORAL at 09:56

## 2019-08-01 RX ADMIN — FERRIC CITRATE 420 MG: 210 TABLET, COATED ORAL at 18:03

## 2019-08-01 RX ADMIN — PREDNISONE 10 MG: 5 TABLET ORAL at 09:55

## 2019-08-01 RX ADMIN — DICYCLOMINE HYDROCHLORIDE 20 MG: 20 TABLET ORAL at 01:55

## 2019-08-01 RX ADMIN — CARVEDILOL 25 MG: 12.5 TABLET, FILM COATED ORAL at 09:55

## 2019-08-01 NOTE — PROCEDURES
[facility]  [phone]    Vitals   Pre   Post   Assessment   Pre   Post     Temp  Temp: 98.5 °F (36.9 °C) (08/01/19 1405)  98.1 LOC  A&0 x4 No change   HR   85 90 Lungs   Clear  no change   B/P   113/70 105/64 Cardiac   NSR s1, S2  no change   Resp   14 18 Skin   Dry and intact  no change   Pain level  Pain Intensity 1: 8 (08/01/19 1322) 0 Edema    none   No change   Orders:    Duration:   Start:    1851 End:    1705 Total:   3 hours   Dialyzer:   Dialyzer/Set Up Inspection: Bandar Page (08/01/19 1405)   K Bath:   Dialysate K (mEq/L): 2 (08/01/19 1405)   Ca Bath:   Dialysate CA (mEq/L): 2.5 (08/01/19 1405)   Na/Bicarb:   Dialysate NA (mEq/L): 140 (08/01/19 1405)   Target Fluid Removal:   Goal/Amount of Fluid to Remove (mL): 2000 mL (08/01/19 1405)   Access     Type & Location:   Right upper arm AVG was assessed with no s/s of infection with a + bruit and thrill   Labs     Obtained/Reviewed   Critical Results Called   Date when labs were drawn-  Hgb-    HGB   Date Value Ref Range Status   08/01/2019 9.9 (L) 11.5 - 16.0 g/dL Final     K-    Potassium   Date Value Ref Range Status   08/01/2019 5.0 3.5 - 5.1 mmol/L Final     Ca-   Calcium   Date Value Ref Range Status   08/01/2019 9.2 8.5 - 10.1 MG/DL Final     Bun-   BUN   Date Value Ref Range Status   08/01/2019 44 (H) 6 - 20 MG/DL Final     Creat-   Creatinine   Date Value Ref Range Status   08/01/2019 9.22 (H) 0.55 - 1.02 MG/DL Final        Medications/ Blood Products Given     Name   Dose   Route and Time           None ordered          Blood Volume Processed (BVP):    50.3 Net Fluid   Removed:  2000 ml   Comments   Time Out Done: 838 Docena Raghu  Primary Nurse Rpt Analia Elliott Rn  Primary Nurse Rpt Post: Isabel Porter RN  Pt Education: Access care  Care Plan:Continuous  Tx Summary: 8306: HD was started using right upper arm AVG, graft was cleaned per policy using alcohol swabs, cannulated x2 with 15 gauge needles without difficulty. 1525: Venous pressure high and access was cannulated with a new 15 gauge needle to the venous part of the access, will continue to monitor. 1555: Nephrology Dr. Tanja Adams was at bedside assessing pt.  1600: A new set- up was done due to blood turning dark within tubing and  high. All blood was returned and treatment was restarted with added fluid rinseback. 1705:HD was completed and all blood was rinsed back possibly. + bruit and thrill to right upper arm AVG with dry gauze dressing in place. No excessive bleeding to needle sites. Primary nurse was given a report. Bed left in lowest position and pt call was within reach. Admiting Diagnosis: Small bowel obstruction  Pt's previous clinic- [facility] unit  Consent signed - Informed Consent Verified: Yes (08/01/19 1405)  [facility] Consent - yes  Hepatitis Status- Hep B Ag- 4/20/2019, Hep B Ab- 4/20/2019  Machine #- Machine Number: [id number] (08/01/19 1405)  Telemetry status- Yes/ monitored at bedside  Pre-dialysis wt. - N/A

## 2019-08-01 NOTE — CONSULTS
[address]    [address] [address]        GASTROENTEROLOGY CONSULTATION NOTE  Will Hugh Darnell  [phone] office  [phone] NP/PA in-hospital cell phone M-F until 4:30PM  After 5PM or on weekends, please call  for physician on call        NAME:  Carlos Dan   :   1985   MRN:   [id number]       Referring Physician: Dr. Kristi Merida    Consult Date: 2019 12:22 PM    Chief Complaint: nausea, vomiting, and abdominal pain     History of Present Illness:  Patient is a 36 y.o. who is seen in consultation at the request of Dr. Johnie Bolivar for hematemesis. Patient has as past medical history of anemia, carditis, hypercholesterolemia, lupus, chronic pain syndrome, end-stage renal disease on dialysis, thromboembolism, and hypertension. She presented to the ED with complaints of abdominal pain and vomiting. Per report, patient had been seen in the ED 3 times last week with similar complaints. Patient was admitted to the hospital on 19. Patient complains of nausea, vomiting, and abdominal pain for the last approximately 1 week. She reports vomiting approximately once a day. Pain is concentrated to the upper abdomen. She describes the pain as an intermittent aching sensation. No clear aggravating or alleviating factors. No clear associations with eating or drinking. Patient had an episode of hematemesis in the ED. No dysphagia or odynophagia. She reports a history of intermittent reflux for which she takes Tums as needed. No change in bowel habits, melena, or hematochezia. She usually has a bowel movement every 2 days that is formed, brown, and easy to pass. She reports constipation over the last week. No fevers. No NSAID use. Patient reports that she is possibly on Eliquis. Denies alcohol, tobacco, or drug use. No history of EGD or colonoscopy.     I have reviewed the emergency room note, hospital admission note, notes by all other clinicians who have seen the patient during this hospitalization to date. I have reviewed the problem list and the reason for this hospitalization. I have reviewed the allergies and the medications the patient was taking at home prior to this hospitalization. PMH:  Past Medical History:   Diagnosis Date    Anemia     secondary to lupus    Asthma     no inhaler use in past 2 to 3 years    Carditis     Chronic kidney disease     ESRD    Chronic pain     DDD (degenerative disc disease), lumbar     ESRD (end stage renal disease) (HCC)     GERD (gastroesophageal reflux disease)     Hemodialysis patient ([facility] [address] 2017    [address]  Tuesday,  Thursday,  and Saturday.  Hypercholesterolemia     Hypertension     Intractable nausea and vomiting 10/21/2015    Long term (current) use of anticoagulants     Lupus (systemic lupus erythematosus) (HCC)     Malignant hypertension with chronic kidney disease stage V ([facility] [address]     Peritoneal dialysis status ([facility] [address] 10/2015    x 2 years Stopped 2017 due to infection and removed.  Poor historian 2018    With medications    Thromboembolus ([facility] [address] 2013    lungs    Transfusion history     Last Transfusion 2017  at [facility]       [address]:  Past Surgical History:   Procedure Laterality Date    HX  SECTION  11/2006    x1    HX OTHER SURGICAL  9/16/15    INSERTION PD CATH; Removed 2017    HX VASCULAR ACCESS Right 2017    Double-Lumen henry catheter upper chest    HX VASCULAR ACCESS Right 2018    right upper arm       Allergies: Allergies   Allergen Reactions    Compazine [Prochlorperazine Edisylate] Nausea and Vomiting and Palpitations     \"hot and lightheaded\"       Home Medications:  Prior to Admission Medications   Prescriptions Last Dose Informant Patient Reported? Taking? L.acidoph & parac-S.therm-Bifido (AJIT Q2/RISAQUAD-2) 16 billion cell cap cap   No No   Sig: Take 1 Cap by mouth daily.    acetaminophen (TYLENOL EXTRA STRENGTH) 500 mg tablet   No No   Sig: Take 2 Tabs by mouth every six (6) hours as needed for Pain. albuterol (PROVENTIL HFA, VENTOLIN HFA, PROAIR HFA) 90 mcg/actuation inhaler  Self No No   Sig: Take 1-2 Puffs by inhalation every four (4) hours as needed for Wheezing or Shortness of Breath. amLODIPine (NORVASC) 10 mg tablet   No No   Sig: Take 1 Tab by mouth daily. amitriptyline (ELAVIL) 75 mg tablet   No No   Sig: Take 1 Tab by mouth nightly for 90 days. For sleep difficulty   azaTHIOprine (IMURAN) 50 mg tablet  Self Yes No   Sig: Take 50 mg by mouth daily (after breakfast). bismuth subsalicylate (PEPTO-BISMOL) 262 mg/15 mL suspension   No No   Sig: Take 30 mL by mouth every four (4) hours as needed for Indigestion (diarrhea). May take 30 mL every 30 minutes as needed for up to 8 doses initially. carvedilol (COREG) 25 mg tablet   No No   Sig: Take 1 Tab by mouth two (2) times daily (with meals). dicyclomine (BENTYL) 20 mg tablet   No No   Sig: Take 1 Tab by mouth every six (6) hours for 20 doses. ferric citrate (AURYXIA) 210 mg iron tablet   Yes No   Sig: Take 420 mg by mouth three (3) times daily (with meals). gemfibrozil (LOPID) 600 mg tablet  Self Yes No   Sig: Take 300 mg by mouth daily. hydroxychloroquine (PLAQUENIL) 200 mg tablet   No No   Sig: TAKE 2 TABLETS BY MOUTH DAILY   losartan (COZAAR) 100 mg tablet   Yes No   Sig: Take 100 mg by mouth daily. minoxidil (LONITEN) 2.5 mg tablet   Yes No   Sig: Take 2.5 mg by mouth daily. ondansetron (ZOFRAN ODT) 8 mg disintegrating tablet   No No   Sig: Take 1 Tab by mouth every eight (8) hours as needed for Nausea. oxyCODONE-acetaminophen (PERCOCET) 5-325 mg per tablet   No No   Sig: Take 1 Tab by mouth two (2) times daily as needed for Pain (severe back pain) for up to 30 days. Max Daily Amount: 2 Tabs. pantoprazole (PROTONIX) 40 mg tablet  Self No No   Sig: Take 1 Tab by mouth Daily (before breakfast).    polyethylene glycol (MIRALAX) 17 gram/dose powder   No No   Sig: Put 8 capfuls of Miralax in a 32 ounce beverage and drink it, followed by 3 capfuls twice daily for the next week and follow up with your primary care physician   predniSONE (DELTASONE) 5 mg tablet   No No   Sig: Take 2 Tabs by mouth daily. senna (SENNA) 8.6 mg tablet   Yes No   Sig: Take 1 Tab by mouth daily as needed for Constipation.  for constipation      Facility-Administered Medications: None       Hospital Medications:  Current Facility-Administered Medications   Medication Dose Route Frequency    morphine injection 1-2 mg  1-2 mg IntraVENous Q4H PRN    amitriptyline (ELAVIL) tablet 75 mg  75 mg Oral QHS    amLODIPine (NORVASC) tablet 10 mg  10 mg Oral DAILY    azaTHIOprine (IMURAN) tablet 50 mg  50 mg Oral DAILY AFTER BREAKFAST    acetaminophen (TYLENOL) tablet 1,000 mg  1,000 mg Oral Q6H PRN    carvedilol (COREG) tablet 25 mg  25 mg Oral BID WITH MEALS    dicyclomine (BENTYL) tablet 20 mg  20 mg Oral Q6H    ferric citrate (AURYXIA) tablet 420 mg  420 mg Oral TID WITH MEALS    gemfibrozil (LOPID) tablet 300 mg  300 mg Oral DAILY    hydroxychloroquine (PLAQUENIL) tablet 400 mg  400 mg Oral DAILY WITH BREAKFAST    losartan (COZAAR) tablet 100 mg  100 mg Oral DAILY    minoxidil (LONITEN) tablet 2.5 mg  2.5 mg Oral DAILY    oxyCODONE-acetaminophen (PERCOCET) 5-325 mg per tablet 1 Tab  1 Tab Oral Q8H PRN    predniSONE (DELTASONE) tablet 10 mg  10 mg Oral DAILY    sodium chloride (NS) flush 5-40 mL  5-40 mL IntraVENous Q8H    sodium chloride (NS) flush 5-40 mL  5-40 mL IntraVENous PRN    0.9% sodium chloride infusion  100 mL/hr IntraVENous CONTINUOUS    ondansetron (ZOFRAN) injection 4 mg  4 mg IntraVENous Q4H PRN    pantoprazole (PROTONIX) 40 mg in sodium chloride 0.9% 10 mL injection  40 mg IntraVENous Q12H    bisacodyl (DULCOLAX) suppository 10 mg  10 mg Rectal DAILY PRN    albuterol-ipratropium (DUO-NEB) 2.5 MG-0.5 MG/3 ML  3 mL Nebulization Q4H PRN Social History:  Social History     Tobacco Use    Smoking status: Never Smoker    Smokeless tobacco: Never Used   Substance Use Topics    Alcohol use: No       Family History:  Family History   Problem Relation Age of Onset    Diabetes Father     Hypertension Father     Cancer Other         aunt with breast cancer    Diabetes Mother        Review of Systems:  Constitutional: negative fever, negative chills, negative weight loss  Eyes:   negative visual changes  ENT:   negative sore throat, tongue or lip swelling  Respiratory:  negative cough, negative dyspnea  Cards:  negative for chest pain, palpitations, lower extremity edema  GI:   See HPI  :  negative for frequency, dysuria  Integument:  negative for rash and pruritus  Heme:  negative for easy bruising and gum/nose bleeding  Musculoskeletal:negative for myalgias, back pain and muscle weakness  Neuro:    negative for headaches, dizziness  Psych: negative for feelings of anxiety, depression     Objective:     Patient Vitals for the past 8 hrs:   BP Temp Pulse Resp SpO2   08/01/19 1134 120/74 98.8 °F (37.1 °C) 90 22 93 %   08/01/19 0748 (!) 152/106 98 °F (36.7 °C) (!) 106 15 95 %     08/01 0701 - 08/01 1900  In: 400 [I.V.:400]  Out: -   07/30 1901 - 08/01 0700  In: 586.7 [I.V.:586.7]  Out: -     EXAM:     CONST:  Pleasant female lying in bed, no acute distress   NEURO:  Alert and oriented x 3   HEENT: EOMI, no scleral icterus   LUNGS: CTA bilaterally anteriorly   CARD:  S1 S2   ABD:  Soft, non distended, generalized tenderness, no rebound, no guarding. + Bowel sounds.    EXT:  Warm   PSYCH: Full, not anxious or agitated     Data Review     Recent Labs     08/01/19 0157 07/31/19  1338   WBC 5.0 5.9   HGB 9.9* 11.7   HCT 32.0* 38.9    218     Recent Labs     08/01/19  0157 07/31/19  1338   * 136   K 5.0 4.1   CL 96* 92*   CO2 27 28   BUN 44* 36*   CREA 9.22* 8.85*   GLU 99 137*   CA 9.2 10.2*     Recent Labs     07/31/19  1338 07/30/19  0601   SGOT 21 20   AP 83 68   TP 10.1* 8.0   ALB 3.8 3.1*   GLOB 6.3* 4.9*   LPSE  --  70*     No results for input(s): INR, PTP, APTT in the last 72 hours. No lab exists for component: INREXT    7/31/19  EXAM: CT ABD PELV WO CONT     INDICATION: Abdominal pain; obstruction?     COMPARISON: 7/27/2019     CONTRAST:  None.     TECHNIQUE:   Thin axial images were obtained through the abdomen and pelvis. Coronal and  sagittal reconstructions were generated. Oral contrast was not administered. CT  dose reduction was achieved through use of a standardized protocol tailored for  this examination and automatic exposure control for dose modulation.      The absence of intravenous contrast material reduces the sensitivity for  evaluation of the solid parenchymal organs of the abdomen.      FINDINGS:   LUNG BASES: Clear. INCIDENTALLY IMAGED HEART AND MEDIASTINUM: Small hiatal hernia. LIVER: No mass or biliary dilatation. GALLBLADDER: Moderately distended. No definite gallbladder wall thickening or  pericholecystic fluid. SPLEEN: No mass. PANCREAS: No mass or ductal dilatation. ADRENALS: Unremarkable. KIDNEYS/URETERS: Small atrophic bilateral kidneys. No evidence of  hydronephrosis. STOMACH: Unremarkable. SMALL BOWEL: Multiple borderline dilated loops of small bowel with air-fluid  levels throughout. There are multiple nondistended loops of small bowel matted  together in the right lower quadrant. COLON: Moderate stool burden. No evidence of obstruction. APPENDIX: Unremarkable. PERITONEUM: No ascites or pneumoperitoneum. RETROPERITONEUM: No lymphadenopathy or aortic aneurysm. REPRODUCTIVE ORGANS: IUD in place. Hypodensity in the left ovary possibly  representing a physiologic cyst. Probable nabothian cysts in the cervix. URINARY BLADDER: No mass or calculus. BONES: No destructive bone lesion.   ADDITIONAL COMMENTS: N/A     IMPRESSION  IMPRESSION:  1.  Multiple loops of borderline dilated small bowel with air-fluid levels  throughout the abdomen. There is a cluster of nondistended loops of bowel in the  right lower quadrant which are matted together. These findings may represent  adhesive disease. This may represent an early partial obstruction or ileus. There is no evidence of high-grade obstruction.     2.  Small atrophic bilateral kidneys. Assessment:   · Nausea/vomiting with hematemesis and abdominal pain: WBC 5.0, Hgb 9.9, platelets 880, normal LFTs, normal lipase. CT abdomen/pelvis without contrast (7/31/19): multiple loops of borderline dilated small bowel with air-fluid levels throughout the abdomina, cluster of non-distended loops of bowel in the RLQ which are matted together, may represent adhesive disease or early partial obstruction versus ileus, no high-grade obstruction; moderate stool burden; findings above.    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    Encounter for monitoring opioid maintenance therapy Z51.81, Z79.891    Malignant hypertension I10    Anemia of renal disease N18.9, D63.1    Dependence on peritoneal dialysis ([facility] [address] Z99.2    ACP (advance care planning) Z71.89    Chronic bilateral low back pain without sciatica M54.5, G89.29    Hypertension I10    Hypokalemia E87.6    Hypomagnesemia E83.42    Hypocalcemia E83.51    History of pulmonary embolism Z86.711    Peritonitis (HCC) K65.9    Generalized abdominal pain R10.84    Other constipation K59.09    Other ascites R18.8    Sepsis (HCC) A41.9    Intra-abdominal abscess (HCC) K65.1    Troponin level elevated R74.8    Rib pain on right side R07.81    SOB (shortness of breath) R06.02    ESRD on hemodialysis (HCC) N18.6, Z99.2    Hyperkalemia E87.5    Vaginal bleeding N93.9    Anemia due to blood loss D50.0    HCAP (healthcare-associated pneumonia) J18.9    SBO (small bowel obstruction) ([facility] [address] K56.609     Plan:     · NPO  · PPI BID  · Trend CBC and transfuse as necessary  · Surgery following  · Plan for EGD tomorrow. Risks of the procedure to include (but not limited to) anesthesia, bleeding, infection, and perforation were discussed. Patient understands and is in agreement with the plan. Please verify consent has been obtained. · Patient was discussed with and will be seen by Dr. Harper Cox  · Thank you for allowing me to participate in care of Stew Jin     Signed By: Dorian Pickard     8/1/2019  12:22 PM     This patient was seen and examined by me in a face-to-face visit today. I reviewed the medical record including lab work, imaging and other provider notes. I confirmed the history as described above. I spoke to the patient, reviewed the medical record including lab work, imaging and other provider notes. I discussed this case in detail with Dorian Pittman. I formulated an  assessment of this patient and developed a treatment plan. I agree with the above consultation note. I agree with the history, exam and assessment and plan as outlined in the note. I would like to add the following: Ms. Gwynda Shone has multiple very complicated medical problems including lupus, ESRD on dialysis, chronic pain syndrome, myocarditis and anemia. She was admitted with generalized abdominal pain despite oxycodone. She has nausea, vomiting and constipation. The vomitus is blood streaked. She has chronic anemia. She has mild generalized abdominal pain on exam. I agree the next step is EGD. I would try a combination of miralax and lactulose for her constipation.

## 2019-08-01 NOTE — PROGRESS NOTES
TRANSFER - IN REPORT:    Verbal report received from [place], RN(name) on Angella Jimenez  being received from ER(unit) for routine progression of care      Report consisted of patients Situation, Background, Assessment and   Recommendations(SBAR). Information from the following report(s) SBAR, Kardex, ED Summary, Intake/Output, MAR, Accordion, Recent Results and Cardiac Rhythm NSR/Sinus Tach was reviewed with the receiving nurse. Opportunity for questions and clarification was provided. Assessment completed upon patients arrival to unit and care assumed.        Primary Nurse Lenard Alberts and DANITZA Rouse performed a dual skin assessment on this patient No impairment noted-Patient does have a few scattered scars  Vikash score is 20

## 2019-08-01 NOTE — ED NOTES
TRANSFER - OUT REPORT:    Verbal report given to luis dockery (name) on Angella Jimenez  being transferred to [facility] (unit) for routine progression of care       Report consisted of patients Situation, Background, Assessment and   Recommendations(SBAR). Information from the following report(s) SBAR, ED Summary and Recent Results was reviewed with the receiving nurse. Lines:   Peripheral IV 07/31/19 Right Hand (Active)   Site Assessment Clean, dry, & intact 7/31/2019  9:22 PM   Phlebitis Assessment 0 7/31/2019  9:22 PM   Infiltration Assessment 0 7/31/2019  9:22 PM   Dressing Status Clean, dry, & intact 7/31/2019  9:22 PM   Dressing Type Transparent 7/31/2019  9:22 PM   Hub Color/Line Status Pink 7/31/2019  9:22 PM        Opportunity for questions and clarification was provided.       Patient transported with:   Registered Nurse

## 2019-08-01 NOTE — H&P
[address]  HISTORY AND PHYSICAL    Name:  Madonna Bernstein  MR#:  [id number]  :  1985  ACCOUNT #:  [de-identified]  ADMIT DATE:  2019    TIME OF SERVICE:  The patient was seen 2019 at 19:28 p.m. PRIMARY CARE PHYSICIAN:  Ada Crouch NP    PRESENTING COMPLAINT:  Abdominal pain and vomiting. HISTORY OF PRESENTING COMPLAINT:  The patient is a 81-year-old female with past medical history of anemia; carditis; hypercholesteremia; lupus; chronic pain syndrome; ESRD, on dialysis; thromboembolism; and hypertension, who presents from home via EMS with complaints of abdominal pain and vomiting. Abdominal pain started four days ago accompanied by constipation. The patient has been seen in the ER about 3 times last week with the same complaints. The patient states that she was seen 3 days ago at [place] [facility], and had an x-ray of the abdomen, which showed more constipation. The patient stated that she was given enema and had a bowel movement. She denies any bowel movement since then and according to the patient, she has been taking sorbitol with no relief. The patient started vomiting earlier today. The patient reports that she has not eaten anything, the patient has vomited multiple times. The last vomiting in the ER was said to have streaks of blood. The patient denies taking any medications for vomiting, but she reported taking oxycodone 5 mg daily. The patient is on dialysis , , and Sundays. The last dialysis was this last Tuesday. The patient has no complaint of fever. She also denies chest pain, chest tightness, shortness of breath, dyspnea on exertion, PND, or orthopnea. There is no diaphoresis, wheezing or rhonchi. The patient has no headache, lightheadedness, dizziness, blurry vision, double vision, syncopal episode, or seizures. She denies neck pain, neck stiffness or confusion. There is no lower extremity pain or swelling. According to the patient, pain in the abdomen is located in the mid abdomen, it is reported as constant, rated as 6/10 in severity. Pain is nonradiating and there is no report of trauma or fall. The patient's pain is said to be achy in nature. Review of her chart, the patient was seen in the ER on 07/27/2019 for abdominal pain. CT of the abdomen was said to be unremarkable. The patient was given morphine, Zofran, and was discharged on Bentyl. She was asked to follow up with primary care physician. Then again on 07/28/2019, the patient was seen for the same problem and was given lactulose, magnesium citrate and Dulcolax. The patient had small bowel movement while in the ER, she was advised to take MiraLax 4 times daily for 2 days and follow up with primary care physician. Again the patient was seen yesterday and had an x-ray of the abdomen done, which showed mild to moderate stool burden. Today CT of the abdomen showed possible partial small bowel obstruction. The patient was discussed with on call general surgeon, Dr. Mesfin Marie, who reports that he see the patient. PAST MEDICAL HISTORY:  1. ESRD, on dialysis Tuesdays, Thursdays, and Saturdays. 2.  Asthma. 3.  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 4.  Carditis. 5.  Chronic pain syndrome. 6.  Degenerative disk disease. 7.  Gastroesophageal reflux disease. 8.  Hypercholesterolemia. 9.  Hypertension. 10.  History of intractable nausea and vomiting. 11.  Long term use of anticoagulant. 12.  Thromboembolism. 13.  History of blood transfusion. PAST SURGICAL HISTORY:  1. Cesarian section. 2.  Surgical insertion of peritoneal dialysis catheter, removed in 2017.  3.  Vascular access double lumen catheter upper chest.  4.  Right upper arm vascular access. ALLERGIES:  COMPAZINE.     MEDICATIONS:  Acetaminophen 500 mg take 2 tablets every 6 hours as needed for pain, albuterol 1 to 2 puff inhalation every 4 hours as needed for wheezing, amitriptyline 75 mg every night, amlodipine 10 mg daily, azathioprine 50 mg daily, bismuth subsalicylate at 30 mL by mouth every 4 hours as needed, carvedilol 25 mg 2 times daily, dicyclomine 20 mg every 6 hours, ferric citrate 420 mg 3 times daily, gemfibrozil 300 mg daily, hydroxychloroquine 200 mg take 2 tablets by mouth daily, losartan 100 mg daily, minoxidil 2.5 mg daily, ondansetron 8 mg every 8 hours as needed for nausea, oxycodone/acetaminophen (Percocet) 5/325 one tablet 3 times daily as needed, Protonix 40 mg daily, polyethylene glycol twice daily as needed, prednisone 10 mg daily, and senna 1 tablet daily. SOCIAL HISTORY:  The patient is single. She lives with her parents. She denies tobacco, alcohol or recreational drug use. FAMILY HISTORY:  Diabetes in father, hypertension in father, breast cancer in aunt, diabetes in mother. REVIEW OF SYSTEMS:  More than 12 systems reviewed and the pertinent positives are in the history of present illness. All others are negative. PHYSICAL EXAMINATION:  GENERAL:  The patient is seen lying in bed, in no acute respiratory distress. VITAL SIGNS:  Blood pressure 179/119, pulse 100, respirations 16, temperature 98.8, pulse ox 99%. HEAD, EARS, NOSE, AND THROAT:  Atraumatic, normocephalic. Anicteric. No pallor. No jaundice. Extraocular muscles intact. Pupils bilaterally reactive, equal, and round. NECK:  Supple. No JVD. No carotid bruits. HEART:  Heart sounds 1 and 2, tachycardiac. No gallop, no friction, no rub. RESPIRATIONS:  No wheezing. No rhonchi. No rales. ABDOMEN:  Soft, tenderness noted around the umbilicus. The patient exhibits guarding and would not allow for rebound to be tested. Bowel sounds decreased. NEUROLOGIC:  Alert and oriented x4. Cranial nerves II through XII intact. SKIN:  Warm and dry. Normal skin color. Normal skin turgor. PSYCH:  Normal mood. Normal affect. BACK:  No CVA tenderness. EXTREMITIES:  No edema. No cyanosis.   Normal range of motion. DIAGNOSTIC TESTING:  WBC 5.9, hemoglobin 11.7, hematocrit 38.9, platelets 290. Sodium 136, potassium 4.1, chloride 92, carbon dioxide 28, glucose 137, BUN 36, creatinine 8.85, calcium 10.2, ALT 14, AST 21, alk phos 83. Troponin negative. EKG showed sinus tachycardia of 103 beats per minutes, left ventricular hypertrophy, left atrial enlargement and prolonged QT. CT of the abdomen and pelvis showed multiple loops of borderline dilated small bowel with air fluid levels throughout the abdomen. There is a cluster of nondistended loops of bowel in the right lower quadrant. These findings may represent adhesive disease and this may represent an early partial obstruction or ileus. There is no evidence of high-grade obstruction. Small atrophic bilateral kidneys. ASSESSMENT:  1. Abdominal pain. 2.  Nausea and vomiting. 3.  Reported hematemesis. 4.  Partial small bowel obstruction. 5.  Possible constipation. 6.  End-stage renal disease, on dialysis. 7.  Hypertension. 8.  Hypercholesterolemia. 9.  Chronic pain syndrome. 10.  Lupus. 11.  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 12.  Asthma. 13.  History of carditis. 14.  History of thromboembolism. 15.  History of transfusion. 16.  Gastroesophageal reflux disease. PLAN:  1. To admit the patient to intermediate care unit under hospitalist service. 2.  IV fluid hydration. 3.  Surgical consult with Dr. Meka Jay. Discussed pt with him. 4.  Nephrology consult for hemodialysis continuation. 5.  Monitor electrolytes with hydration. 6.  NPO except as per surgery. 7.  Monitor vital signs including blood pressure as per unit protocol. 8.  Type and cross. 9.  GI consult. 10.  Pain control. Antiemetics. 11.  Restart appropriate home medications. 12. DVT prophylaxis. 13.  Fall precaution. Skin care precaution. Bedrest.  14.  Discussed advance directive with the patient. The parents are the next of kin. The patient is full code.   15. Further management will depend on the patient's clinical progression, evaluation by attending physician, result of tests and recommendation by specialists.       Yon Dinero MD      [id number]  D:  07/31/2019 19:45  T:  07/31/2019 22:56  JOB #:  [id number]

## 2019-08-01 NOTE — PROGRESS NOTES
Problem: Small Bowel Obstruction: Day 1  Goal: Activity/Safety  Outcome: Progressing Towards Goal  Note:   Bed is in the lowest position and wheels are locked, call bell is within reach, bathroom light is on during evening hours, gripper socks are on and patient has been instructed to call out for assistance if needed. As of now, patient is free from falls and will continue to be monitored.      Goal: Consults, if ordered  Outcome: Progressing Towards Goal  Note:   Patient has consults scheduled with nephrology and gastroenterology  Goal: Respiratory  Outcome: Progressing Towards Goal  Note:   Patient has clear lung sounds and is on room air with O2 saturations above 95%  Goal: *Absence of nausea/vomiting  Outcome: Progressing Towards Goal  Note:   Patient has reported no nausea since arriving on the floor

## 2019-08-01 NOTE — PROGRESS NOTES
Problem: Falls - Risk of  Goal: *Absence of Falls  Description  Document Natali Javier Fall Risk and appropriate interventions in the flowsheet. Outcome: Progressing Towards Goal  Note:   Fall Risk Interventions:            Medication Interventions: Patient to call before getting OOB, Teach patient to arise slowly, Evaluate medications/consider consulting pharmacy    Elimination Interventions: Call light in reach              Problem: Small Bowel Obstruction: Day 1  Goal: Consults, if ordered  Outcome: Progressing Towards Goal  Note:   GI following,plan to do EGD in the morning,patient still have abdominal pain,fairly controlled with pain medications.      Problem: Hypertension  Goal: *Blood pressure within specified parameters  Outcome: Progressing Towards Goal

## 2019-08-01 NOTE — CONSULTS
Assessment:  ESRD: TTS [facility]  HTN: Uncontrolled-> often related to interdialytic fluid gains  Partial SBO: 2 to constipation /maybe adhesions. Clinically improving  Anemia 2 to ESRD: Hgb just below goal  SLE    Plan/Recommendations:  HD today  Decrease IVF to 50cc/hr  Diet per GI/primary team  Ct current BP meds  Renally adjust new meds  Am labs      Discussed with patient and HD RN  Thanks for the consultation. Renal service will follow patient with you. Please contact me with any questions or concerns. Initial Consult note         Patient name: Ivette Garcia  MR no: [id number]  Date of admission: 7/31/2019  Date of consultation: 8/1/2019  Requested by: Dr. Tena Roe  Reason for consult: ESRD    Patient seen and examined. History obtained from patient and chart review. Relevant labs, data and notes reviewed. HPI: Ivette Garcia is a 36 y.o. female with PMH significant for ESRD on chronic HD TTS at [facility], hx of lupus nephritis presenting with abd pain/n/v. +Constipation. W/u c/w partial SBO. Patient reports recurrent issues with constipation despite stool softners. Remains on pain meds. +Hematemesis reported. Large BM last night-> abd pain better. Nephrology consulted to resume ESRD care    Seen on HD at 3:58pm. BP better. No cramping    PMH:  Past Medical History:   Diagnosis Date    Anemia     secondary to lupus    Asthma     no inhaler use in past 2 to 3 years    Carditis     Chronic kidney disease     ESRD    Chronic pain     DDD (degenerative disc disease), lumbar     [facility]end stage renal disease) ([facility])     GERD (gastroesophageal reflux disease)     Hemodialysis patient ([facility] 12/21/2017    [address]  Tuesday,  Thursday,  and Saturday.      Hypercholesterolemia     Hypertension     Intractable nausea and vomiting 10/21/2015    Long term (current) use of anticoagulants     Lupus (systemic lupus erythematosus) (HCC)     Malignant hypertension with chronic kidney disease stage V [facility]     Peritoneal dialysis status ([facility] 10/2015    x 2 years Stopped 2017 due to infection and removed.  Poor historian 2018    With medications    Thromboembolus ([facility] 2013    lungs    Transfusion history     Last Transfusion 2017  at [facility]     [address] [place]:  Past Surgical History:   Procedure Laterality Date    HX  SECTION  11/2006    x1    HX OTHER SURGICAL  9/16/15    INSERTION PD CATH;  Removed 2017    HX VASCULAR ACCESS Right 2017    Double-Lumen henry catheter upper chest    HX VASCULAR ACCESS Right 2018    right upper arm       Social history:   Social History     Tobacco Use    Smoking status: Never Smoker    Smokeless tobacco: Never Used   Substance Use Topics    Alcohol use: No    Drug use: Yes     Types: Prescription, OTC       Family history:  Not contributory    Allergies   Allergen Reactions    Compazine [Prochlorperazine Edisylate] Nausea and Vomiting and Palpitations     \"hot and lightheaded\"       Current Facility-Administered Medications   Medication Dose Route Frequency Last Dose    morphine injection 1-2 mg  1-2 mg IntraVENous Q4H PRN 1 mg at 19 1322    amitriptyline (ELAVIL) tablet 75 mg  75 mg Oral QHS 75 mg at 19 2233    amLODIPine (NORVASC) tablet 10 mg  10 mg Oral DAILY 10 mg at 19 0956    azaTHIOprine (IMURAN) tablet 50 mg  50 mg Oral DAILY AFTER BREAKFAST 50 mg at 19 0957    acetaminophen (TYLENOL) tablet 1,000 mg  1,000 mg Oral Q6H PRN      carvedilol (COREG) tablet 25 mg  25 mg Oral BID WITH MEALS 25 mg at 19 0955    dicyclomine (BENTYL) tablet 20 mg  20 mg Oral Q6H Stopped at 19 1400    ferric citrate (AURYXIA) tablet 420 mg  420 mg Oral TID WITH MEALS Stopped at 19 1200    gemfibrozil (LOPID) tablet 300 mg  300 mg Oral DAILY 300 mg at 19 0956    hydroxychloroquine (PLAQUENIL) tablet 400 mg  400 mg Oral DAILY WITH BREAKFAST 400 mg at 08/01/19 0955    losartan (COZAAR) tablet 100 mg  100 mg Oral DAILY 100 mg at 08/01/19 0956    minoxidil (LONITEN) tablet 2.5 mg  2.5 mg Oral DAILY 2.5 mg at 08/01/19 0955    oxyCODONE-acetaminophen (PERCOCET) 5-325 mg per tablet 1 Tab  1 Tab Oral Q8H PRN 1 Tab at 07/31/19 2210    predniSONE (DELTASONE) tablet 10 mg  10 mg Oral DAILY 10 mg at 08/01/19 0955    sodium chloride (NS) flush 5-40 mL  5-40 mL IntraVENous Q8H 10 mL at 08/01/19 1325    sodium chloride (NS) flush 5-40 mL  5-40 mL IntraVENous PRN      0.9% sodium chloride infusion  100 mL/hr IntraVENous CONTINUOUS 100 mL/hr at 08/01/19 0854    ondansetron (ZOFRAN) injection 4 mg  4 mg IntraVENous Q4H PRN      pantoprazole (PROTONIX) 40 mg in sodium chloride 0.9% 10 mL injection  40 mg IntraVENous Q12H 40 mg at 08/01/19 0854    bisacodyl (DULCOLAX) suppository 10 mg  10 mg Rectal DAILY PRN      albuterol-ipratropium (DUO-NEB) 2.5 MG-0.5 MG/3 ML  3 mL Nebulization Q4H PRN         ROS (besides HPI):    General: No fever. No weight changes  ENT: No hearing loss or visual changes  Cardiovascular: No Chest pain  Pulmonary: No SOB  GI: + abdominal pain. + Nausea/Vomiting. No Diarrhea. No blood in stool  : No blood in urine. No foamy or cloudy urine  Musculoskeletal: No joint swelling or redness. No morning stiffness  Endocrine: no cold or heat intolerance  Psych: denies anxiety or depression  Neuro: No light headedness or dizziness    Objective   Visit Vitals  /58   Pulse 88   Temp 98.5 °F (36.9 °C) (Oral)   Resp 19   Wt 84.4 kg (186 lb 1.6 oz)   SpO2 93%   BMI 30.97 kg/m²       Physical Exam:    Gen: NAD    HEENT: AT/NC, EOMI, moist mucous membrane, no scleral icterus    Neck: no JVD, no cervical lymphadenopathy, no carotid bruit    Lungs/Chest wall: Breath sounds normal. Symmetrical chest wall expansion. No accessory muscle use. Clear to auscultation    Cardiovascular: Normal S1/S2, normal rate, regular rhythm.  No pericardial rub. Abdomen: soft, NT, ND, BS+, no HSM    Ext: R AVF in use. No edema    Skin: warm and dry. No rashes    : no CVA tenderness    CNS: alert awake. Answers appropriately. Labs/Data:    Lab Results   Component Value Date/Time    Sodium 135 (L) 08/01/2019 01:57 AM    Potassium 5.0 08/01/2019 01:57 AM    Chloride 96 (L) 08/01/2019 01:57 AM    CO2 27 08/01/2019 01:57 AM    Anion gap 12 08/01/2019 01:57 AM    Glucose 99 08/01/2019 01:57 AM    BUN 44 (H) 08/01/2019 01:57 AM    Creatinine 9.22 (H) 08/01/2019 01:57 AM    BUN/Creatinine ratio 5 (L) 08/01/2019 01:57 AM    GFR est AA 6 (L) 08/01/2019 01:57 AM    GFR est non-AA 5 (L) 08/01/2019 01:57 AM    Calcium 9.2 08/01/2019 01:57 AM       Lab Results   Component Value Date/Time    WBC 5.0 08/01/2019 01:57 AM    Hemoglobin (POC) 7.8 (L) 01/19/2018 12:30 PM    HGB 9.9 (L) 08/01/2019 01:57 AM    Hematocrit (POC) 23 (L) 01/19/2018 12:30 PM    HCT 32.0 (L) 08/01/2019 01:57 AM    PLATELET 065 53/67/9148 01:57 AM    MCV 92.8 08/01/2019 01:57 AM       Urine analysis: No results found for this or any previous visit.       No components found for: SPEP, UPEP  Lab Results   Component Value Date/Time    Protein,urine 24 hr 2071.00 (H) 05/30/2009 10:00 PM    Total protein 7.30 07/27/2009 03:30 AM     Lab Results   Component Value Date/Time    Microalbumin/Creat ratio (mg/g creat) 1015 09/22/2009 06:30 PM    Microalb/Creat ratio (ug/mg creat.) 2,443.5 (H) 03/07/2014 02:16 PM    Microalbumin,urine random 250.86 09/22/2009 06:30 PM         Intake/Output Summary (Last 24 hours) at 8/1/2019 1558  Last data filed at 8/1/2019 1147  Gross per 24 hour   Intake 986.67 ml   Output --   Net 986.67 ml       Wt Readings from Last 3 Encounters:   08/01/19 84.4 kg (186 lb 1.6 oz)   07/30/19 88 kg (194 lb 0.1 oz)   07/28/19 90.4 kg (199 lb 4.7 oz)       Signed by:  Kyle Calles MD  Nephrology and Hypertension  [facility]

## 2019-08-01 NOTE — PROGRESS NOTES
TRANSFER - IN REPORT:    Verbal report received from edi(name) on Stew Jin  being received from [facility] (unit) for routine progression of care      Report consisted of patients Situation, Background, Assessment and   Recommendations(SBAR). Information from the following report(s) SBAR, Kardex, Intake/Output and MAR was reviewed with the receiving nurse. Opportunity for questions and clarification was provided. Assessment completed upon patients arrival to unit and care assumed.

## 2019-08-01 NOTE — PROGRESS NOTES
Bedside shift change report given to Jessica Navarro RN (oncoming nurse) by Mayte Be RN (offgoing nurse). Report included the following information SBAR, Kardex, ED Summary, Accordion, Recent Results and Cardiac Rhythm Sinus Tach/NSR.

## 2019-08-01 NOTE — PROGRESS NOTES
Hospitalist Progress Note  Melissa Villanueva MD  Answering service: [phone] OR [phone] from in house phone      Date of Service:  2019  NAME:  Lenora Case                                                         :  1985                                               MRN:  [id number]    Brief admission summary   The patient is a 30-year-old female with past medical history of anemia; carditis; hypercholesteremia; lupus; chronic pain syndrome; ESRD, on dialysis; thromboembolism; and hypertension, who presents from home via EMS with complaints of abdominal pain and vomiting. Abdominal pain started four days ago accompanied by constipation. Subjective/interval history    -Seen this afternoon after dialysis. Abdominal pain much better. No nausea or vomiting. No BM or flatus  -BP significantly improved with dialysis    Assessment and plan   Partial SBO most likely due to constipation,but adhesion is a possibility as well  -bening managed conservatively and clinically improving  -GI and surgery onboard. Started on clears. Lactulose and radha colace   -EGD in AM?hematemesis  -PPI bid    Accelerated hypertension  -BP improved with dialysis. Continue amlodipine,carvedilo,losartan and minoxidil     SLE:,no evidence of active disease: continue azathioprine,hydroxychloroquine and prednisone. ESRD on HD  -access: right arm AVF  -Schedule TTS    Anemia due to chronic disease(esrd,lupus etc)  -Hb stable. Diet:clears,npo after midnight for EGD on   Code status:full  DVT prophylaxis:scd  SUP:ppi bid  Disposition:anticipate home  Plan of care discussed with:patient,nurse. Current facility administered and prior to admit medications reviewed. x         Review of Systems:  A comprehensive review of systems was negative except for that written in the HPI.         PHYSICAL EXAM:  O:  Visit Vitals  BP 97/64   Pulse 91   Temp 98.5 °F (36.9 °C) (Oral)   Resp 20   Wt 84.4 kg (186 lb 1.6 oz)   SpO2 93%   BMI 30.97 kg/m²       General Appears comfortable,not in distress. HEENT Head atraumatic. Unicteric sclera. Moist buccal mucosa. PERRLA     CVS RRR, no MRG, no JVD, no peripheral edema       Chest No deformity  No accessory muscle use  Vesicular air entry symmetrically  Scattered  wheezing,ronchi or crepitations       Abdomen &Pelvis Non distended. Non tender. Normoactive.small midline surgical scar. Genitourinary  No CVA or suprapubic tenderness       Musculoskeletal R arm AVF+thrill. Skin No erythema,rash,depigmentation       Neurology Mental status: alert and oriented x4  Cranial nerves: CN 2-12 intact.   Motor 5/5 through out     Psychiatry            Intake/Output Summary (Last 24 hours) at 8/1/2019 1732  Last data filed at 8/1/2019 1147  Gross per 24 hour   Intake 986.67 ml   Output --   Net 986.67 ml          Recent labs & imaging reviewed:    Problem List as of 8/1/2019 Date Reviewed: 7/31/2019          Codes Class Noted - Resolved    * (Principal) SBO (small bowel obstruction) ([facility] ICD-10-CM: E12.423  ICD-9-CM: 560.9  7/31/2019 - Present        HCAP (healthcare-associated pneumonia) ICD-10-CM: J18.9  ICD-9-CM: 486  7/16/2019 - Present        Anemia due to blood loss ICD-10-CM: D50.0  ICD-9-CM: 280.0  4/21/2019 - Present        Vaginal bleeding ICD-10-CM: N93.9  ICD-9-CM: 623.8  4/18/2019 - Present        Hyperkalemia ICD-10-CM: E87.5  ICD-9-CM: 276.7  3/4/2019 - Present        ESRD on hemodialysis ([facility] ICD-10-CM: N18.6, Z99.2  ICD-9-CM: 585.6, V45.11  8/27/2018 - Present        SOB (shortness of breath) ICD-10-CM: R06.02  ICD-9-CM: 786.05  8/15/2018 - Present        Rib pain on right side ICD-10-CM: R07.81  ICD-9-CM: 786.50  7/2/2018 - Present        Troponin level elevated ICD-10-CM: R74.8  ICD-9-CM: 790.6  6/17/2018 - Present        Intra-abdominal abscess ([facility] ICD-10-CM: K65.1  ICD-9-CM: 567.22  5/12/2018 - Present        Sepsis ([facility] ICD-10-CM: A41.9  ICD-9-CM: 038.9, 995.91  4/29/2018 - Present        Generalized abdominal pain ICD-10-CM: R10.84  ICD-9-CM: 789.07  4/4/2018 - Present        Other constipation ICD-10-CM: K59.09  ICD-9-CM: 564.09  4/4/2018 - Present        Other ascites ICD-10-CM: R18.8  ICD-9-CM: 789.59  4/4/2018 - Present        Peritonitis ([address] ICD-10-CM: K65.9  ICD-9-CM: 567.9  3/11/2018 - Present        History of pulmonary embolism ICD-10-CM: T82.774  ICD-9-CM: V12.55  12/8/2017 - Present        Hypomagnesemia ICD-10-CM: E83.42  ICD-9-CM: 275.2  10/30/2017 - Present        Hypocalcemia ICD-10-CM: E83.51  ICD-9-CM: 275.41  10/30/2017 - Present        Hypokalemia ICD-10-CM: E87.6  ICD-9-CM: 276.8  10/27/2017 - Present        Hypertension (Chronic) ICD-10-CM: I10  ICD-9-CM: 401.9  10/14/2017 - Present        Chronic bilateral low back pain without sciatica ICD-10-CM: M54.5, G89.29  ICD-9-CM: 724.2, 338.29  5/26/2017 - Present        Anemia of renal disease ICD-10-CM: N18.9, D63.1  ICD-9-CM: 285.21  2/21/2017 - Present        Dependence on peritoneal dialysis [facility]) ICD-10-CM: Z99.2  ICD-9-CM: V45.11  2/21/2017 - Present        ACP (advance care planning) ICD-10-CM: Z71.89  ICD-9-CM: V65.49  2/21/2017 - Present        Malignant hypertension ICD-10-CM: I10  ICD-9-CM: 401.0  7/11/2016 - Present        Encounter for monitoring opioid maintenance therapy ICD-10-CM: Z51.81, Z79.891  ICD-9-CM: V58.83, V58.69  11/3/2015 - Present        Lupus nephritis ([facility] ICD-10-CM: M32.14  ICD-9-CM: 710.0, 583.81  3/10/2012 - Present        Lupus ICD-10-CM: M32.9  ICD-9-CM: 710.0  2/27/2012 - Present        RESOLVED: Neutropenia ([facility] ICD-10-CM: D70.9  ICD-9-CM: 288.00  9/15/2018 - 9/19/2018        RESOLVED: Anemia in chronic kidney disease ICD-10-CM: N18.9, D63.1  ICD-9-CM: 285.21  9/15/2018 - 9/19/2018        RESOLVED: Malignant hypertensive urgency ICD-10-CM: I16.0  ICD-9-CM: 401.0  9/10/2018 - 9/19/2018        RESOLVED: Hypertensive urgency ICD-10-CM: I16.0  ICD-9-CM: 401.9  7/19/2018 - 7/22/2018        RESOLVED: Sepsis ([facility] ICD-10-CM: A41.9  ICD-9-CM: 038.9, 995.91  12/12/2017 - 12/22/2017        RESOLVED: Pneumonia ICD-10-CM: J18.9  ICD-9-CM: 038  10/14/2017 - 12/8/2017        RESOLVED: Pleural effusion, left ICD-10-CM: J90  ICD-9-CM: 511.9  10/14/2017 - 12/8/2017        RESOLVED: ESRD on peritoneal dialysis ([facility] (Chronic) ICD-10-CM: N18.6, Z99.2  ICD-9-CM: 585.6, V45.11  10/7/2016 - 8/27/2018        RESOLVED: Idiopathic chronic inflammatory bowel disease ICD-10-CM: K63.89  ICD-9-CM: 558.9  8/28/2013 - 8/28/2013        RESOLVED: Abdominal pain ICD-10-CM: R10.9  ICD-9-CM: 789.00  8/28/2013 - 9/3/2013        RESOLVED: Hypoxia ICD-10-CM: R09.02  ICD-9-CM: 799.02  4/25/2013 - 4/30/2013        RESOLVED: Lupus nephritis ([facility] ICD-10-CM: M32.14  ICD-9-CM: 710.0, 583.81  4/27/2012 - 4/28/2012                Claudia Gaxiola MD  Internal Medicine  Date of Service: 8/1/2019

## 2019-08-01 NOTE — PROGRESS NOTES
A Spiritual Care Partner Volunteer visited patient in Rm [id number] on 8/01/2019.   Documented by:  Chaplain Newby MDiv, MS, [facility]  [phone] ([phone])

## 2019-08-01 NOTE — PROGRESS NOTES
TRANSFER - OUT REPORT:    Verbal report given to Jessica(name) on Rise Marielos  being transferred to [facility](unit) for routine progression of care       Report consisted of patients Situation, Background, Assessment and   Recommendations(SBAR). Information from the following report(s) Kardex was reviewed with the receiving nurse. Lines:   Peripheral IV 07/31/19 Left Hand (Active)   Site Assessment Clean, dry, & intact 8/1/2019  3:00 PM   Phlebitis Assessment 0 8/1/2019  3:00 PM   Infiltration Assessment 0 8/1/2019  3:00 PM   Dressing Status Clean, dry, & intact 8/1/2019  3:00 PM   Dressing Type Transparent;Tape 8/1/2019  3:00 PM   Hub Color/Line Status Blue; Infusing 8/1/2019  3:00 PM   Action Taken Open ports on tubing capped 8/1/2019  4:04 AM   Alcohol Cap Used Yes 8/1/2019  3:00 PM        Opportunity for questions and clarification was provided.       Patient transported with:   [employer]

## 2019-08-01 NOTE — PROGRESS NOTES
[facility]  Daily Progress Note    Admit Date: 2019    Subjective:     Last 24 hrs: Pain is same. Currently undergoing dialysis. Had a BM yesterday in the ED after receiving enema. No flatus or BM today. EGD planned for tomorrow. Currently   NPO. Objective:     Blood pressure 103/56, pulse 83, temperature 98.5 °F (36.9 °C), temperature source Oral, resp. rate 17, weight 84.4 kg (186 lb 1.6 oz), SpO2 93 %. Temp (24hrs), Av.5 °F (36.9 °C), Min:98 °F (36.7 °C), Max:98.9 °F (37.2 °C)      _____________________  Physical Exam:     Alert and Oriented, lying in bed, no acute distress.   Cardiovascular: RRR  Lungs:CTAB   Abdomen: + BS, soft, mild tenderness across upper abdomen      Assessment:   Principal Problem:    SBO (small bowel obstruction) ([address] (2019)    abdominal pain        Plan:     No acute surgical indication  EGD tomorrow per GI  Following          Abigail Shin, ACNP - [address] [facility] [place] [address],  [facility], [address] [place] [address]  [phone]    Data Review:    Recent Labs     19  0157 19  1338 19  0601   WBC 5.0 5.9 9.9   HGB 9.9* 11.7 9.4*   HCT 32.0* 38.9 29.4*    218 236     Recent Labs     19  0157 19  1338 19  0601   * 136 135*   K 5.0 4.1 5.0   CL 96* 92* 94*   CO2 27 28 26   GLU 99 137* 101*   BUN 44* 36* 62*   CREA 9.22* 8.85* 11.37*   CA 9.2 10.2* 8.9   ALB  --  3.8 3.1*   TBILI  --  0.4 0.3   SGOT  --  21 20   ALT  --  14 12     Recent Labs     19  0601   LPSE 70*           ______________________  Medications:    Current Facility-Administered Medications   Medication Dose Route Frequency    morphine injection 1-2 mg  1-2 mg IntraVENous Q4H PRN    amitriptyline (ELAVIL) tablet 75 mg  75 mg Oral QHS    amLODIPine (NORVASC) tablet 10 mg  10 mg Oral DAILY    azaTHIOprine (IMURAN) tablet 50 mg  50 mg Oral DAILY AFTER BREAKFAST    acetaminophen (TYLENOL) tablet 1,000 mg  1,000 mg Oral Q6H PRN    carvedilol (COREG) tablet 25 mg  25 mg Oral BID WITH MEALS    dicyclomine (BENTYL) tablet 20 mg  20 mg Oral Q6H    ferric citrate (AURYXIA) tablet 420 mg  420 mg Oral TID WITH MEALS    gemfibrozil (LOPID) tablet 300 mg  300 mg Oral DAILY    hydroxychloroquine (PLAQUENIL) tablet 400 mg  400 mg Oral DAILY WITH BREAKFAST    losartan (COZAAR) tablet 100 mg  100 mg Oral DAILY    minoxidil (LONITEN) tablet 2.5 mg  2.5 mg Oral DAILY    oxyCODONE-acetaminophen (PERCOCET) 5-325 mg per tablet 1 Tab  1 Tab Oral Q8H PRN    predniSONE (DELTASONE) tablet 10 mg  10 mg Oral DAILY    sodium chloride (NS) flush 5-40 mL  5-40 mL IntraVENous Q8H    sodium chloride (NS) flush 5-40 mL  5-40 mL IntraVENous PRN    0.9% sodium chloride infusion  100 mL/hr IntraVENous CONTINUOUS    ondansetron (ZOFRAN) injection 4 mg  4 mg IntraVENous Q4H PRN    pantoprazole (PROTONIX) 40 mg in sodium chloride 0.9% 10 mL injection  40 mg IntraVENous Q12H    bisacodyl (DULCOLAX) suppository 10 mg  10 mg Rectal DAILY PRN    albuterol-ipratropium (DUO-NEB) 2.5 MG-0.5 MG/3 ML  3 mL Nebulization Q4H PRN       ADDENDUM:  Kia Baker MD  Pt seen and examined. No acute surgical issues. Pt reported feeling better overall. No nausea or vomiting and pain is better. Trial of clear liquids. NPO after midnight for EGD tomorrow. Repeat AXR in am.  Out of bed. Encourage ambulation.

## 2019-08-01 NOTE — PROGRESS NOTES
Reason for Readmission:     Patient admitted for stomach pain. Previous admission was 7/15-7/18 for report of dyspnea. RRAT Score and Risk Level:     27-High-Red     Level of Readmission:    Level one      Care Conference scheduled:   No       Resources/supports as identified by patient/family:   Patient has Medicaid personal care aids daily for 4 hours. She lives at home with her parents, brother, daughter and grandmother. Top Challenges facing patient (as identified by patient/family and CM): Finances/Medication cost?     Patient has Medicare and Medicaid/preferred pharmacy is [facility]. Transportation   Patient has driving ability but her personal care aid assists when needed. Support system or lack thereof? Family and personal care aids. Living arrangements? Patient lives with her parents, daughter, brother and grandmother. Self-care/ADLs/Cognition? Patient reports independence with some ADLs but states that she requires assistance with showering, cooking and cleaning. Patient uses a walker at times and receives dialysis at \[facility]\"" at 11:15. Current Advanced Directive/Advance Care Plan:  Patient does not have an AMD on file and she is a Full code. Plan for utilizing home health:   No anticipated needs. Transition of Care Plan:    Based on readmission, the patient's previous Plan of Care   has been evaluated and/or modified. The current Transition of Care Plan is:     Patient will be receiving an endoscopy tomorrow. CM to monitor and will assist with transitional care planning as needed.      Bharti Ventura MS

## 2019-08-02 ENCOUNTER — APPOINTMENT (OUTPATIENT)
Dept: GENERAL RADIOLOGY | Age: 34
DRG: 388 | End: 2019-08-02
Attending: SURGERY
Payer: MEDICARE

## 2019-08-02 ENCOUNTER — ANESTHESIA (OUTPATIENT)
Dept: ENDOSCOPY | Age: 34
DRG: 388 | End: 2019-08-02
Payer: MEDICARE

## 2019-08-02 ENCOUNTER — ANESTHESIA EVENT (OUTPATIENT)
Dept: ENDOSCOPY | Age: 34
DRG: 388 | End: 2019-08-02
Payer: MEDICARE

## 2019-08-02 LAB
ALBUMIN SERPL-MCNC: 2.9 G/DL (ref 3.5–5)
ALBUMIN/GLOB SERPL: 0.6 {RATIO} (ref 1.1–2.2)
ALP SERPL-CCNC: 61 U/L (ref 45–117)
ALT SERPL-CCNC: 10 U/L (ref 12–78)
ANION GAP SERPL CALC-SCNC: 11 MMOL/L (ref 5–15)
AST SERPL-CCNC: 22 U/L (ref 15–37)
BILIRUB SERPL-MCNC: 0.3 MG/DL (ref 0.2–1)
BUN SERPL-MCNC: 29 MG/DL (ref 6–20)
BUN/CREAT SERPL: 4 (ref 12–20)
CALCIUM SERPL-MCNC: 8.7 MG/DL (ref 8.5–10.1)
CHLORIDE SERPL-SCNC: 101 MMOL/L (ref 97–108)
CO2 SERPL-SCNC: 24 MMOL/L (ref 21–32)
CREAT SERPL-MCNC: 7.06 MG/DL (ref 0.55–1.02)
ERYTHROCYTE [DISTWIDTH] IN BLOOD BY AUTOMATED COUNT: 14.6 % (ref 11.5–14.5)
GLOBULIN SER CALC-MCNC: 4.7 G/DL (ref 2–4)
GLUCOSE SERPL-MCNC: 76 MG/DL (ref 65–100)
HBV SURFACE AG SER QL: <0.1 INDEX
HBV SURFACE AG SER QL: NEGATIVE
HCT VFR BLD AUTO: 28.7 % (ref 35–47)
HGB BLD-MCNC: 8.7 G/DL (ref 11.5–16)
MAGNESIUM SERPL-MCNC: 2.4 MG/DL (ref 1.6–2.4)
MCH RBC QN AUTO: 28.8 PG (ref 26–34)
MCHC RBC AUTO-ENTMCNC: 30.3 G/DL (ref 30–36.5)
MCV RBC AUTO: 95 FL (ref 80–99)
NRBC # BLD: 0 K/UL (ref 0–0.01)
NRBC BLD-RTO: 0 PER 100 WBC
PHOSPHATE SERPL-MCNC: 6.4 MG/DL (ref 2.6–4.7)
PLATELET # BLD AUTO: 168 K/UL (ref 150–400)
PMV BLD AUTO: 10 FL (ref 8.9–12.9)
POTASSIUM SERPL-SCNC: 4.7 MMOL/L (ref 3.5–5.1)
PROT SERPL-MCNC: 7.6 G/DL (ref 6.4–8.2)
RBC # BLD AUTO: 3.02 M/UL (ref 3.8–5.2)
SODIUM SERPL-SCNC: 136 MMOL/L (ref 136–145)
WBC # BLD AUTO: 3.6 K/UL (ref 3.6–11)

## 2019-08-02 PROCEDURE — 74011250636 HC RX REV CODE- 250/636: Performed by: NURSE PRACTITIONER

## 2019-08-02 PROCEDURE — 65270000029 HC RM PRIVATE

## 2019-08-02 PROCEDURE — 74011250636 HC RX REV CODE- 250/636: Performed by: INTERNAL MEDICINE

## 2019-08-02 PROCEDURE — 74011250636 HC RX REV CODE- 250/636: Performed by: NURSE ANESTHETIST, CERTIFIED REGISTERED

## 2019-08-02 PROCEDURE — 74011250637 HC RX REV CODE- 250/637: Performed by: INTERNAL MEDICINE

## 2019-08-02 PROCEDURE — 74011000250 HC RX REV CODE- 250: Performed by: INTERNAL MEDICINE

## 2019-08-02 PROCEDURE — 84100 ASSAY OF PHOSPHORUS: CPT

## 2019-08-02 PROCEDURE — C9113 INJ PANTOPRAZOLE SODIUM, VIA: HCPCS | Performed by: INTERNAL MEDICINE

## 2019-08-02 PROCEDURE — 74011250637 HC RX REV CODE- 250/637: Performed by: HOSPITALIST

## 2019-08-02 PROCEDURE — 74019 RADEX ABDOMEN 2 VIEWS: CPT

## 2019-08-02 PROCEDURE — 76060000031 HC ANESTHESIA FIRST 0.5 HR: Performed by: INTERNAL MEDICINE

## 2019-08-02 PROCEDURE — 76040000019: Performed by: INTERNAL MEDICINE

## 2019-08-02 PROCEDURE — 85027 COMPLETE CBC AUTOMATED: CPT

## 2019-08-02 PROCEDURE — 36415 COLL VENOUS BLD VENIPUNCTURE: CPT

## 2019-08-02 PROCEDURE — 0DJ08ZZ INSPECTION OF UPPER INTESTINAL TRACT, VIA NATURAL OR ARTIFICIAL OPENING ENDOSCOPIC: ICD-10-PCS | Performed by: INTERNAL MEDICINE

## 2019-08-02 PROCEDURE — 80053 COMPREHEN METABOLIC PANEL: CPT

## 2019-08-02 PROCEDURE — 83735 ASSAY OF MAGNESIUM: CPT

## 2019-08-02 PROCEDURE — 74011000250 HC RX REV CODE- 250: Performed by: NURSE ANESTHETIST, CERTIFIED REGISTERED

## 2019-08-02 RX ORDER — GLYCOPYRROLATE 0.2 MG/ML
INJECTION INTRAMUSCULAR; INTRAVENOUS AS NEEDED
Status: DISCONTINUED | OUTPATIENT
Start: 2019-08-02 | End: 2019-08-02 | Stop reason: HOSPADM

## 2019-08-02 RX ORDER — SODIUM CHLORIDE 0.9 % (FLUSH) 0.9 %
5-40 SYRINGE (ML) INJECTION AS NEEDED
Status: DISCONTINUED | OUTPATIENT
Start: 2019-08-02 | End: 2019-08-02 | Stop reason: HOSPADM

## 2019-08-02 RX ORDER — EPINEPHRINE 0.1 MG/ML
1 INJECTION INTRACARDIAC; INTRAVENOUS
Status: DISCONTINUED | OUTPATIENT
Start: 2019-08-02 | End: 2019-08-02 | Stop reason: HOSPADM

## 2019-08-02 RX ORDER — FENTANYL CITRATE 50 UG/ML
25-200 INJECTION, SOLUTION INTRAMUSCULAR; INTRAVENOUS
Status: DISCONTINUED | OUTPATIENT
Start: 2019-08-02 | End: 2019-08-02 | Stop reason: HOSPADM

## 2019-08-02 RX ORDER — PROPOFOL 10 MG/ML
INJECTION, EMULSION INTRAVENOUS AS NEEDED
Status: DISCONTINUED | OUTPATIENT
Start: 2019-08-02 | End: 2019-08-02 | Stop reason: HOSPADM

## 2019-08-02 RX ORDER — ATROPINE SULFATE 0.1 MG/ML
0.5 INJECTION INTRAVENOUS
Status: DISCONTINUED | OUTPATIENT
Start: 2019-08-02 | End: 2019-08-02 | Stop reason: HOSPADM

## 2019-08-02 RX ORDER — SODIUM CHLORIDE 9 MG/ML
50 INJECTION, SOLUTION INTRAVENOUS CONTINUOUS
Status: DISCONTINUED | OUTPATIENT
Start: 2019-08-02 | End: 2019-08-02 | Stop reason: HOSPADM

## 2019-08-02 RX ORDER — DEXTROMETHORPHAN/PSEUDOEPHED 2.5-7.5/.8
1.2 DROPS ORAL
Status: DISCONTINUED | OUTPATIENT
Start: 2019-08-02 | End: 2019-08-02 | Stop reason: HOSPADM

## 2019-08-02 RX ORDER — NALOXONE HYDROCHLORIDE 0.4 MG/ML
0.4 INJECTION, SOLUTION INTRAMUSCULAR; INTRAVENOUS; SUBCUTANEOUS
Status: DISCONTINUED | OUTPATIENT
Start: 2019-08-02 | End: 2019-08-02 | Stop reason: HOSPADM

## 2019-08-02 RX ORDER — SODIUM CHLORIDE 9 MG/ML
INJECTION, SOLUTION INTRAVENOUS
Status: DISCONTINUED | OUTPATIENT
Start: 2019-08-02 | End: 2019-08-02 | Stop reason: HOSPADM

## 2019-08-02 RX ORDER — LIDOCAINE HYDROCHLORIDE 20 MG/ML
INJECTION, SOLUTION EPIDURAL; INFILTRATION; INTRACAUDAL; PERINEURAL AS NEEDED
Status: DISCONTINUED | OUTPATIENT
Start: 2019-08-02 | End: 2019-08-02 | Stop reason: HOSPADM

## 2019-08-02 RX ORDER — SODIUM CHLORIDE 0.9 % (FLUSH) 0.9 %
5-40 SYRINGE (ML) INJECTION EVERY 8 HOURS
Status: DISCONTINUED | OUTPATIENT
Start: 2019-08-02 | End: 2019-08-02 | Stop reason: HOSPADM

## 2019-08-02 RX ORDER — FLUMAZENIL 0.1 MG/ML
0.2 INJECTION INTRAVENOUS
Status: DISCONTINUED | OUTPATIENT
Start: 2019-08-02 | End: 2019-08-02 | Stop reason: HOSPADM

## 2019-08-02 RX ADMIN — FERRIC CITRATE 420 MG: 210 TABLET, COATED ORAL at 07:21

## 2019-08-02 RX ADMIN — MORPHINE SULFATE 1 MG: 2 INJECTION, SOLUTION INTRAMUSCULAR; INTRAVENOUS at 03:21

## 2019-08-02 RX ADMIN — Medication 10 ML: at 18:01

## 2019-08-02 RX ADMIN — MORPHINE SULFATE 2 MG: 2 INJECTION, SOLUTION INTRAMUSCULAR; INTRAVENOUS at 10:15

## 2019-08-02 RX ADMIN — SODIUM CHLORIDE: 900 INJECTION, SOLUTION INTRAVENOUS at 16:37

## 2019-08-02 RX ADMIN — PROPOFOL 100 MG: 10 INJECTION, EMULSION INTRAVENOUS at 16:50

## 2019-08-02 RX ADMIN — DICYCLOMINE HYDROCHLORIDE 20 MG: 20 TABLET ORAL at 07:21

## 2019-08-02 RX ADMIN — SODIUM CHLORIDE 50 ML/HR: 900 INJECTION, SOLUTION INTRAVENOUS at 18:12

## 2019-08-02 RX ADMIN — LIDOCAINE HYDROCHLORIDE 60 MG: 20 INJECTION, SOLUTION EPIDURAL; INFILTRATION; INTRACAUDAL; PERINEURAL at 16:50

## 2019-08-02 RX ADMIN — LACTULOSE 30 ML: 20 SOLUTION ORAL at 18:00

## 2019-08-02 RX ADMIN — SODIUM CHLORIDE 10 ML: 9 INJECTION INTRAMUSCULAR; INTRAVENOUS; SUBCUTANEOUS at 21:07

## 2019-08-02 RX ADMIN — CARVEDILOL 25 MG: 12.5 TABLET, FILM COATED ORAL at 18:01

## 2019-08-02 RX ADMIN — FERRIC CITRATE 420 MG: 210 TABLET, COATED ORAL at 18:10

## 2019-08-02 RX ADMIN — HYDROXYCHLOROQUINE SULFATE 400 MG: 200 TABLET, FILM COATED ORAL at 07:21

## 2019-08-02 RX ADMIN — AMITRIPTYLINE HYDROCHLORIDE 75 MG: 50 TABLET, FILM COATED ORAL at 21:06

## 2019-08-02 RX ADMIN — DICYCLOMINE HYDROCHLORIDE 20 MG: 20 TABLET ORAL at 18:02

## 2019-08-02 RX ADMIN — SODIUM CHLORIDE 40 MG: 9 INJECTION INTRAMUSCULAR; INTRAVENOUS; SUBCUTANEOUS at 21:06

## 2019-08-02 RX ADMIN — PROPOFOL 75 MG: 10 INJECTION, EMULSION INTRAVENOUS at 16:47

## 2019-08-02 RX ADMIN — CARVEDILOL 25 MG: 12.5 TABLET, FILM COATED ORAL at 07:21

## 2019-08-02 RX ADMIN — SODIUM CHLORIDE 10 ML: 9 INJECTION INTRAMUSCULAR; INTRAVENOUS; SUBCUTANEOUS at 07:24

## 2019-08-02 RX ADMIN — MORPHINE SULFATE 2 MG: 2 INJECTION, SOLUTION INTRAMUSCULAR; INTRAVENOUS at 18:14

## 2019-08-02 RX ADMIN — GLYCOPYRROLATE 0.2 MG: 0.2 INJECTION, SOLUTION INTRAMUSCULAR; INTRAVENOUS at 16:44

## 2019-08-02 RX ADMIN — DICYCLOMINE HYDROCHLORIDE 20 MG: 20 TABLET ORAL at 03:23

## 2019-08-02 NOTE — ANESTHESIA PREPROCEDURE EVALUATION
Relevant Problems   No relevant active problems       Anesthetic History   No history of anesthetic complications  PONV          Review of Systems / Medical History  Patient summary reviewed, nursing notes reviewed and pertinent labs reviewed    Pulmonary  Within defined limits          Asthma        Neuro/Psych   Within defined limits           Cardiovascular  Within defined limits  Hypertension              Exercise tolerance: >4 METS     GI/Hepatic/Renal  Within defined limits   GERD    Renal disease: dialysis       Endo/Other  Within defined limits      Arthritis     Other Findings              Physical Exam    Airway  Mallampati: III  TM Distance: 4 - 6 cm  Neck ROM: normal range of motion   Mouth opening: Normal     Cardiovascular  Regular rate and rhythm,  S1 and S2 normal,  no murmur, click, rub, or gallop             Dental  No notable dental hx       Pulmonary  Breath sounds clear to auscultation               Abdominal  GI exam deferred       Other Findings            Anesthetic Plan    ASA: 3  Anesthesia type: MAC

## 2019-08-02 NOTE — PROCEDURES
[address], [facility] (EGD) Procedure Note    Sofia Liang  1985  [id number]      Procedure: Endoscopic Gastroduodenoscopy --diagnostic    Indication: hematemesis    Pre-operative Diagnosis: see indication above    Post-operative Diagnosis: see findings below    : Thomas Mike. Olivia Leon MD    Referring Provider:  Estiven Gonzalez NP      Anesthesia/Sedation:  MAC anesthesia        Procedure Details     After informed consent was obtained for the procedure, with all risks and benefits of procedure explained the patient was taken to the endoscopy suite and placed in the left lateral decubitus position. Following sequential administration of sedation as per above, the endoscope was inserted into the mouth and advanced under direct vision to third portion of the duodenum. A careful inspection was made as the gastroscope was withdrawn, including a retroflexed view of the proximal stomach; findings and interventions are described below. Findings:   Esophagus:normal  Stomach: mild patchy erythema in the gastric body and gastric antrum (biopsies deferred in setting of recent hematemesis). Single sessile 8-9 mm pre-pyloric polyp (not removed)  Duodenum: normal to third portion      Therapies:  none    Specimens: none         EBL: None      Complications:   None; patient tolerated the procedure well. Impression:    As above    Recommendations:  1. Advance diet to clear liquids  2. Office follow up in 2-3 weeks with plan for repeat EGD for gastric polyp removal  3. Avoid NSAID's, alcohol, and tobacco products  4. Continue BID PPI (can be transitioned to PO upon discharge)  5. We will sign off for now. Please call with any questions. Signed By: Thomas Mike.  Olivia Leon MD     8/2/2019  4:53 PM

## 2019-08-02 NOTE — PROGRESS NOTES
Bedside shift change report given to Alice Kulkarni (oncoming nurse) by Kaylee Guan (offgoing nurse). Report included the following information SBAR, Kardex, Intake/Output and MAR.

## 2019-08-02 NOTE — PROGRESS NOTES
· Surgery Progress Note    8/2/2019    Admit Date: 7/31/2019    Subjective:       Pt has complaint of mild abdominal discomfort but pain is much improved . Pts pain present - adequately treated. No SOB. No CP. Pt is ambulating. Patient 's current diet is NPO, she tolerated clears yesterday until NPO at MN for EGD today . Moon Ask Pt reports  no nausea and no vomiting. Pt reports no fever or chills    Bowel Movements: None        Objective:     Blood pressure 118/86, pulse 82, temperature 98.3 °F (36.8 °C), resp. rate 16, weight 186 lb 4.8 oz (84.5 kg), SpO2 95 %. No intake/output data recorded.     07/31 1901 - 08/02 0700  In: 1308.3 [I.V.:1308.3]  Out: 2000     General appearance: alert, cooperative, no distress, appears stated age  Lungs: clear to auscultation bilaterally  Heart: regular rate and rhythm  Abd:soft, nondistended, nontender, without guarding, without rebound  Extremities: , no  edema  Neurologic: Grossly normal    Data Review    Recent Results (from the past 12 hour(s))   CBC W/O DIFF    Collection Time: 08/02/19  3:57 AM   Result Value Ref Range    WBC 3.6 3.6 - 11.0 K/uL    RBC 3.02 (L) 3.80 - 5.20 M/uL    HGB 8.7 (L) 11.5 - 16.0 g/dL    HCT 28.7 (L) 35.0 - 47.0 %    MCV 95.0 80.0 - 99.0 FL    MCH 28.8 26.0 - 34.0 PG    MCHC 30.3 30.0 - 36.5 g/dL    RDW 14.6 (H) 11.5 - 14.5 %    PLATELET 332 421 - 927 K/uL    MPV 10.0 8.9 - 12.9 FL    NRBC 0.0 0  WBC    ABSOLUTE NRBC 0.00 0.00 - 0.01 K/uL   MAGNESIUM    Collection Time: 08/02/19  3:57 AM   Result Value Ref Range    Magnesium 2.4 1.6 - 2.4 mg/dL   PHOSPHORUS    Collection Time: 08/02/19  3:57 AM   Result Value Ref Range    Phosphorus 6.4 (H) 2.6 - 4.7 MG/DL   METABOLIC PANEL, COMPREHENSIVE    Collection Time: 08/02/19  3:57 AM   Result Value Ref Range    Sodium 136 136 - 145 mmol/L    Potassium 4.7 3.5 - 5.1 mmol/L    Chloride 101 97 - 108 mmol/L    CO2 24 21 - 32 mmol/L    Anion gap 11 5 - 15 mmol/L    Glucose 76 65 - 100 mg/dL    BUN 29 (H) 6 - 20 MG/DL    Creatinine 7.06 (H) 0.55 - 1.02 MG/DL    BUN/Creatinine ratio 4 (L) 12 - 20      GFR est AA 8 (L) >60 ml/min/1.73m2    GFR est non-AA 7 (L) >60 ml/min/1.73m2    Calcium 8.7 8.5 - 10.1 MG/DL    Bilirubin, total 0.3 0.2 - 1.0 MG/DL    ALT (SGPT) 10 (L) 12 - 78 U/L    AST (SGOT) 22 15 - 37 U/L    Alk.  phosphatase 61 45 - 117 U/L    Protein, total 7.6 6.4 - 8.2 g/dL    Albumin 2.9 (L) 3.5 - 5.0 g/dL    Globulin 4.7 (H) 2.0 - 4.0 g/dL    A-G Ratio 0.6 (L) 1.1 - 2.2         Assessment:     Principal Problem:    SBO (small bowel obstruction) (HCC) (7/31/2019)      ESRD on HD  Hematemesis   Chronic anemia   Plan:   Dietshe is NPO for EGD today, ok to resume clears after procedure if she is cleared by GI   Pain management  GI Prophylaxis  OOB/ ambulate   She is due for HD tomorrow  Await return of bowel function but overall improved, no acute plan for surgery,   AXR pending   She is on bowel regimen   Further plan per Dr. Faviola Booker PA-C

## 2019-08-02 NOTE — ANESTHESIA POSTPROCEDURE EVALUATION
Post-Anesthesia Evaluation and Assessment    Patient: Aubrey Masterson MRN: [id number]  SSN: [id number]    YOB: 1985  Age: 36 y.o. Sex: female      I have evaluated the patient and they are stable and ready for discharge from the PACU. Cardiovascular Function/Vital Signs  Visit Vitals  BP (!) 125/92   Pulse 85   Temp 36.9 °C (98.4 °F)   Resp 19   Wt 84.5 kg (186 lb 4.8 oz)   SpO2 100%   Breastfeeding? No   BMI 31.00 kg/m²       Patient is status post MAC anesthesia for Procedure(s):  ESOPHAGOGASTRODUODENOSCOPY (EGD). Nausea/Vomiting: None    Postoperative hydration reviewed and adequate. Pain:  Pain Scale 1: Numeric (0 - 10) (08/02/19 1718)  Pain Intensity 1: 0 (08/02/19 1718)   Managed    Neurological Status: At baseline    Mental Status, Level of Consciousness: Alert and  oriented to person, place, and time    Pulmonary Status:   O2 Device: Room air (08/02/19 1718)   Adequate oxygenation and airway patent    Complications related to anesthesia: None    Post-anesthesia assessment completed. No concerns    Signed By: Susie Lim MD     August 2, 2019              Procedure(s):  ESOPHAGOGASTRODUODENOSCOPY (EGD). MAC    <BSHSIANPOST>    Vitals Value Taken Time   /92 8/2/2019  5:18 PM   Temp 36.9 °C (98.4 °F) 8/2/2019  5:07 PM   Pulse 84 8/2/2019  5:20 PM   Resp 23 8/2/2019  5:20 PM   SpO2 100 % 8/2/2019  5:20 PM   Vitals shown include unvalidated device data.

## 2019-08-02 NOTE — PROGRESS NOTES
TRANSFER - OUT REPORT:    Verbal report given to OLLIE RN(name) on Lenora Case  being transferred to [facility](unit) for routine post - op       Report consisted of patients Situation, Background, Assessment and   Recommendations(SBAR). Information from the following report(s) Procedure Summary was reviewed with the receiving nurse. Lines:   Peripheral IV 07/31/19 Left Hand (Active)   Site Assessment Clean, dry, & intact 8/2/2019  8:00 AM   Phlebitis Assessment 0 8/2/2019  8:00 AM   Infiltration Assessment 0 8/2/2019  8:00 AM   Dressing Status Clean, dry, & intact 8/2/2019  8:00 AM   Dressing Type Transparent 8/2/2019  8:00 AM   Hub Color/Line Status Infusing 8/2/2019  8:00 AM   Action Taken Open ports on tubing capped 8/2/2019  8:00 AM   Alcohol Cap Used Yes 8/2/2019  8:00 AM        Opportunity for questions and clarification was provided.       Patient transported with:   [employer]

## 2019-08-02 NOTE — PROGRESS NOTES
Hospitalist Progress Note  Destiney Marie MD  Answering service: [phone] OR [phone] from in house phone      Date of Service:  2019  NAME:  Tatiana Moreno                                                         :  1985                                               MRN:  [id number]    Brief admission summary   The patient is a 15-year-old female with past medical history of anemia; carditis; hypercholesteremia; lupus; chronic pain syndrome; ESRD, on dialysis; thromboembolism; and hypertension, who presents from home via EMS with complaints of abdominal pain and vomiting. Abdominal pain started four days ago accompanied by constipation. Subjective/interval history    -Feeling better. No nausea or vomiting. Tolerated clear liquid diet yesterday,but she is NPO this morning for EGD    Assessment and plan   Partial SBO most likely due to constipation,but adhesion is a possibility as well  -bening managed conservatively and clinically improving  -GI and surgery onboard. .Lactulose and radha colace   -EGD today?hematemesis. Tolerated clears,but NPO for EGD now. -PPI bid    Accelerated hypertension  -BP improved with dialysis. Continue amlodipine,carvedilo,losartan and minoxidil     SLE:,no evidence of active disease: continue azathioprine,hydroxychloroquine and prednisone. ESRD on HD  -access: right arm AVF  -Schedule TTS    Anemia due to chronic disease(esrd,lupus etc)  -Hb stable. Diet:NPO for EGD,resume clears and advance after EGD  Code status:full  DVT prophylaxis:scd  SUP:ppi bid  Disposition:anticipate home  Plan of care discussed with:patient,nurse. Current facility administered and prior to admit medications reviewed. x         Review of Systems:  A comprehensive review of systems was negative except for that written in the HPI.         PHYSICAL EXAM:  O:  Visit Vitals  /86 (BP 1 Location: Left arm, BP Patient Position: At rest)   Pulse 82   Temp 98.3 °F (36.8 °C)   Resp 16   Wt 84.5 kg (186 lb 4.8 oz)   SpO2 95%   BMI 31.00 kg/m²       General Appears comfortable,not in distress. HEENT Head atraumatic. Unicteric sclera. Moist buccal mucosa. PERRLA     CVS RRR, no MRG, no JVD, no peripheral edema       Chest No deformity  No accessory muscle use  Vesicular air entry symmetrically  Scattered  wheezing,ronchi or crepitations       Abdomen &Pelvis Non distended. Non tender. Normoactive.small midline surgical scar. Genitourinary  No CVA or suprapubic tenderness       Musculoskeletal R arm AVF+thrill. Skin No erythema,rash,depigmentation       Neurology Mental status: alert and oriented x4  Cranial nerves: CN 2-12 intact.   Motor 5/5 through out     Psychiatry            Intake/Output Summary (Last 24 hours) at 8/2/2019 1103  Last data filed at 8/1/2019 1715  Gross per 24 hour   Intake 721.67 ml   Output 2000 ml   Net -1278.33 ml          Recent labs & imaging reviewed:    Problem List as of 8/2/2019 Date Reviewed: 7/31/2019          Codes Class Noted - Resolved    * (Principal) SBO (small bowel obstruction) ([facility].) ICD-10-CM: A01.391  ICD-9-CM: 560.9  7/31/2019 - Present        HCAP (healthcare-associated pneumonia) ICD-10-CM: J18.9  ICD-9-CM: 486  7/16/2019 - Present        Anemia due to blood loss ICD-10-CM: D50.0  ICD-9-CM: 280.0  4/21/2019 - Present        Vaginal bleeding ICD-10-CM: N93.9  ICD-9-CM: 623.8  4/18/2019 - Present        Hyperkalemia ICD-10-CM: E87.5  ICD-9-CM: 276.7  3/4/2019 - Present        ESRD on hemodialysis ([facility].) ICD-10-CM: N18.6, Z99.2  ICD-9-CM: 585.6, V45.11  8/27/2018 - Present        SOB (shortness of breath) ICD-10-CM: R06.02  ICD-9-CM: 786.05  8/15/2018 - Present        Rib pain on right side ICD-10-CM: R07.81  ICD-9-CM: 786.50  7/2/2018 - Present        Troponin level elevated ICD-10-CM: R74.8  ICD-9-CM: 790.6  6/17/2018 - Present        Intra-abdominal abscess ([facility] ICD-10-CM: K65.1  ICD-9-CM: 567.22 5/12/2018 - Present        Sepsis ([facility] ICD-10-CM: A41.9  ICD-9-CM: 038.9, 995.91  4/29/2018 - Present        Generalized abdominal pain ICD-10-CM: R10.84  ICD-9-CM: 789.07  4/4/2018 - Present        Other constipation ICD-10-CM: K59.09  ICD-9-CM: 564.09  4/4/2018 - Present        Other ascites ICD-10-CM: R18.8  ICD-9-CM: 789.59  4/4/2018 - Present        Peritonitis ([facility] ICD-10-CM: K65.9  ICD-9-CM: 567.9  3/11/2018 - Present        History of pulmonary embolism ICD-10-CM: A52.644  ICD-9-CM: V12.55  12/8/2017 - Present        Hypomagnesemia ICD-10-CM: E83.42  ICD-9-CM: 275.2  10/30/2017 - Present        Hypocalcemia ICD-10-CM: E83.51  ICD-9-CM: 275.41  10/30/2017 - Present        Hypokalemia ICD-10-CM: E87.6  ICD-9-CM: 276.8  10/27/2017 - Present        Hypertension (Chronic) ICD-10-CM: I10  ICD-9-CM: 401.9  10/14/2017 - Present        Chronic bilateral low back pain without sciatica ICD-10-CM: M54.5, G89.29  ICD-9-CM: 724.2, 338.29  5/26/2017 - Present        Anemia of renal disease ICD-10-CM: N18.9, D63.1  ICD-9-CM: 285.21  2/21/2017 - Present        Dependence on peritoneal dialysis [facility]) ICD-10-CM: Z99.2  ICD-9-CM: V45.11  2/21/2017 - Present        ACP (advance care planning) ICD-10-CM: Z71.89  ICD-9-CM: V65.49  2/21/2017 - Present        Malignant hypertension ICD-10-CM: I10  ICD-9-CM: 401.0  7/11/2016 - Present        Encounter for monitoring opioid maintenance therapy ICD-10-CM: Z51.81, Z79.891  ICD-9-CM: V58.83, V58.69  11/3/2015 - Present        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.) ICD-10-CM: M32.14  ICD-9-CM: 710.0, 583.81  3/10/2012 - Present        Lupus ICD-10-CM: M32.9  ICD-9-CM: 710.0  2/27/2012 - Present        RESOLVED: Neutropenia ([facility] 75.) ICD-10-CM: D70.9  ICD-9-CM: 288.00  9/15/2018 - 9/19/2018        RESOLVED: Anemia in chronic kidney disease ICD-10-CM: N18.9, D63.1  ICD-9-CM: 285.21  9/15/2018 - 9/19/2018        RESOLVED: Malignant hypertensive urgency ICD-10-CM: I16.0  ICD-9-CM: 401.0  9/10/2018 - 9/19/2018 RESOLVED: Hypertensive urgency ICD-10-CM: I16.0  ICD-9-CM: 401.9  7/19/2018 - 7/22/2018        RESOLVED: Sepsis ([facility] 75.) ICD-10-CM: A41.9  ICD-9-CM: 038.9, 995.91  12/12/2017 - 12/22/2017        RESOLVED: Pneumonia ICD-10-CM: J18.9  ICD-9-CM: 134  10/14/2017 - 12/8/2017        RESOLVED: Pleural effusion, left ICD-10-CM: J90  ICD-9-CM: 511.9  10/14/2017 - 12/8/2017        RESOLVED: ESRD on peritoneal dialysis ([facility] 75.) (Chronic) ICD-10-CM: N18.6, Z99.2  ICD-9-CM: 585.6, V45.11  10/7/2016 - 8/27/2018        RESOLVED: Idiopathic chronic inflammatory bowel disease ICD-10-CM: K63.89  ICD-9-CM: 558.9  8/28/2013 - 8/28/2013        RESOLVED: Abdominal pain ICD-10-CM: R10.9  ICD-9-CM: 789.00  8/28/2013 - 9/3/2013        RESOLVED: Hypoxia ICD-10-CM: R09.02  ICD-9-CM: 799.02  4/25/2013 - 4/30/2013        RESOLVED: 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

## 2019-08-02 NOTE — PROGRESS NOTES
Patient name: Tatiana Moreno  MRN: [id number]    Nephrology Progress note:    Assessment:  ESRD: TTS [facility]  HTN: Uncontrolled-> often related to interdialytic fluid gains  Partial SBO: 2 to constipation /maybe adhesions. Clinically improving  Anemia 2 to ESRD: Hgb just below goal  Hematemesis  SLE    Plan/Recommendations:  HD tomorrow  Gentle IVF as she is NPO  EGD  Ct current BP meds  Rx Lactulose PRN on discharge. Renally adjust new meds  Am labs      Subjective:  Denies any more N/V. No abd pain    ROS:   No nausea, no vomiting  No chest pain, no shortness of breath    Exam:  Visit Vitals  /86 (BP 1 Location: Left arm, BP Patient Position: At rest)   Pulse 82   Temp 98.3 °F (36.8 °C)   Resp 16   Wt 84.5 kg (186 lb 4.8 oz)   SpO2 95%   BMI 31.00 kg/m²     Wt Readings from Last 3 Encounters:   08/02/19 84.5 kg (186 lb 4.8 oz)   07/30/19 88 kg (194 lb 0.1 oz)   07/28/19 90.4 kg (199 lb 4.7 oz)       Intake/Output Summary (Last 24 hours) at 8/2/2019 0955  Last data filed at 8/1/2019 1715  Gross per 24 hour   Intake 721.67 ml   Output 2000 ml   Net -1278.33 ml       Gen: NAD  HEENT: No icterus, mmm, no oral exudate, AT/NC  Lungs/Chest wall: Clear. No accessory muscle use. Cardiovascular: Regular rate, normal rhythm. Abdomen/: Soft, NT, ND, BS+. No palpable organomegaly. Ext: No significant peripheral edema.  R AVF        Current Facility-Administered Medications   Medication Dose Route Frequency Last Dose    morphine injection 1-2 mg  1-2 mg IntraVENous Q4H PRN 1 mg at 08/02/19 0321    lactulose (CHRONULAC) 10 gram/15 mL solution 30 mL  20 g Oral BID 30 mL at 08/01/19 2030    senna-docusate (PERICOLACE) 8.6-50 mg per tablet 2 Tab  2 Tab Oral DAILY 2 Tab at 08/01/19 1803    cloNIDine HCl (CATAPRES) tablet 0.1 mg  0.1 mg Oral Q6H PRN      amitriptyline (ELAVIL) tablet 75 mg  75 mg Oral QHS 75 mg at 08/01/19 2235    amLODIPine (NORVASC) tablet 10 mg  10 mg Oral DAILY 10 mg at 08/01/19 0956    azaTHIOprine (IMURAN) tablet 50 mg  50 mg Oral DAILY AFTER BREAKFAST 50 mg at 08/01/19 0957    acetaminophen (TYLENOL) tablet 1,000 mg  1,000 mg Oral Q6H PRN      carvedilol (COREG) tablet 25 mg  25 mg Oral BID WITH MEALS 25 mg at 08/02/19 0721    dicyclomine (BENTYL) tablet 20 mg  20 mg Oral Q6H 20 mg at 08/02/19 6169    ferric citrate (AURYXIA) tablet 420 mg  420 mg Oral TID WITH MEALS 420 mg at 08/02/19 0721    gemfibrozil (LOPID) tablet 300 mg  300 mg Oral DAILY 300 mg at 08/01/19 0956    hydroxychloroquine (PLAQUENIL) tablet 400 mg  400 mg Oral DAILY WITH BREAKFAST 400 mg at 08/02/19 0827    losartan (COZAAR) tablet 100 mg  100 mg Oral DAILY 100 mg at 08/01/19 0956    minoxidil (LONITEN) tablet 2.5 mg  2.5 mg Oral DAILY 2.5 mg at 08/01/19 0955    oxyCODONE-acetaminophen (PERCOCET) 5-325 mg per tablet 1 Tab  1 Tab Oral Q8H PRN 1 Tab at 08/01/19 1803    predniSONE (DELTASONE) tablet 10 mg  10 mg Oral DAILY 10 mg at 08/01/19 0955    sodium chloride (NS) flush 5-40 mL  5-40 mL IntraVENous Q8H 10 mL at 08/02/19 0724    sodium chloride (NS) flush 5-40 mL  5-40 mL IntraVENous PRN      0.9% sodium chloride infusion  50 mL/hr IntraVENous CONTINUOUS 50 mL/hr at 08/01/19 2038    ondansetron (ZOFRAN) injection 4 mg  4 mg IntraVENous Q4H PRN      pantoprazole (PROTONIX) 40 mg in sodium chloride 0.9% 10 mL injection  40 mg IntraVENous Q12H 40 mg at 08/01/19 2235    bisacodyl (DULCOLAX) suppository 10 mg  10 mg Rectal DAILY PRN      albuterol-ipratropium (DUO-NEB) 2.5 MG-0.5 MG/3 ML  3 mL Nebulization Q4H PRN         Labs/Data:    Lab Results   Component Value Date/Time    WBC 3.6 08/02/2019 03:57 AM    Hemoglobin (POC) 7.8 (L) 01/19/2018 12:30 PM    HGB 8.7 (L) 08/02/2019 03:57 AM    Hematocrit (POC) 23 (L) 01/19/2018 12:30 PM    HCT 28.7 (L) 08/02/2019 03:57 AM    PLATELET 904 71/50/2566 03:57 AM    MCV 95.0 08/02/2019 03:57 AM       Lab Results   Component Value Date/Time    Sodium 136 08/02/2019 03:57 AM    Potassium 4.7 08/02/2019 03:57 AM    Chloride 101 08/02/2019 03:57 AM    CO2 24 08/02/2019 03:57 AM    Anion gap 11 08/02/2019 03:57 AM    Glucose 76 08/02/2019 03:57 AM    BUN 29 (H) 08/02/2019 03:57 AM    Creatinine 7.06 (H) 08/02/2019 03:57 AM    BUN/Creatinine ratio 4 (L) 08/02/2019 03:57 AM    GFR est AA 8 (L) 08/02/2019 03:57 AM    GFR est non-AA 7 (L) 08/02/2019 03:57 AM    Calcium 8.7 08/02/2019 03:57 AM       Patient seen and examined. Chart reviewed. Labs, data and other pertinent notes reviewed in last 24 hrs.     Discussed with patient    Signed by:  Antoinette Barrios MD  [id number] [employer]

## 2019-08-02 NOTE — PROGRESS NOTES
TRANSFER - IN REPORT:    Verbal report received from JUDD RN(name) on Zoraida Andrea  being received from 512(unit) for ordered procedure      Report consisted of patients Situation, Background, Assessment and   Recommendations(SBAR). Information from the following report(s) SBAR was reviewed with the receiving nurse. Opportunity for questions and clarification was provided. Assessment completed upon patients arrival to unit and care assumed.

## 2019-08-02 NOTE — PROGRESS NOTES
8/2/19 -   SURJIT:  - Plan for EGD today at around 12:30  - AXR today, 8/2  - Currently NPO for testing  - Will resume clears after testing  - HD tomorrow, 8/3 with T[facility]T-S schedule at [facility]  - Potential discharge to own home over the weekend with care aid services and home support from family  CRM: Gareth Schumacher, MPH, [address] [place] [address]; Z: [phone]

## 2019-08-03 ENCOUNTER — APPOINTMENT (OUTPATIENT)
Dept: GENERAL RADIOLOGY | Age: 34
DRG: 388 | End: 2019-08-03
Attending: HOSPITALIST
Payer: MEDICARE

## 2019-08-03 PROCEDURE — 74018 RADEX ABDOMEN 1 VIEW: CPT

## 2019-08-03 PROCEDURE — 74011250636 HC RX REV CODE- 250/636: Performed by: NURSE PRACTITIONER

## 2019-08-03 PROCEDURE — 74011250636 HC RX REV CODE- 250/636: Performed by: INTERNAL MEDICINE

## 2019-08-03 PROCEDURE — 90935 HEMODIALYSIS ONE EVALUATION: CPT

## 2019-08-03 PROCEDURE — 74011250637 HC RX REV CODE- 250/637: Performed by: HOSPITALIST

## 2019-08-03 PROCEDURE — 74011636637 HC RX REV CODE- 636/637: Performed by: INTERNAL MEDICINE

## 2019-08-03 PROCEDURE — C9113 INJ PANTOPRAZOLE SODIUM, VIA: HCPCS | Performed by: INTERNAL MEDICINE

## 2019-08-03 PROCEDURE — 65270000029 HC RM PRIVATE

## 2019-08-03 PROCEDURE — 74011000250 HC RX REV CODE- 250: Performed by: INTERNAL MEDICINE

## 2019-08-03 PROCEDURE — 74011250637 HC RX REV CODE- 250/637: Performed by: INTERNAL MEDICINE

## 2019-08-03 RX ADMIN — MINOXIDIL 2.5 MG: 2.5 TABLET ORAL at 10:05

## 2019-08-03 RX ADMIN — LOSARTAN POTASSIUM 100 MG: 50 TABLET ORAL at 10:05

## 2019-08-03 RX ADMIN — OXYCODONE AND ACETAMINOPHEN 1 TABLET: 5; 325 TABLET ORAL at 07:17

## 2019-08-03 RX ADMIN — CARVEDILOL 25 MG: 12.5 TABLET, FILM COATED ORAL at 07:17

## 2019-08-03 RX ADMIN — ONDANSETRON 4 MG: 2 INJECTION INTRAMUSCULAR; INTRAVENOUS at 21:26

## 2019-08-03 RX ADMIN — DICYCLOMINE HYDROCHLORIDE 20 MG: 20 TABLET ORAL at 08:00

## 2019-08-03 RX ADMIN — FERRIC CITRATE 420 MG: 210 TABLET, COATED ORAL at 07:18

## 2019-08-03 RX ADMIN — MORPHINE SULFATE 2 MG: 2 INJECTION, SOLUTION INTRAMUSCULAR; INTRAVENOUS at 18:11

## 2019-08-03 RX ADMIN — SODIUM CHLORIDE 40 MG: 9 INJECTION INTRAMUSCULAR; INTRAVENOUS; SUBCUTANEOUS at 12:05

## 2019-08-03 RX ADMIN — SODIUM CHLORIDE 40 MG: 9 INJECTION INTRAMUSCULAR; INTRAVENOUS; SUBCUTANEOUS at 21:26

## 2019-08-03 RX ADMIN — AMLODIPINE BESYLATE 10 MG: 5 TABLET ORAL at 10:05

## 2019-08-03 RX ADMIN — AMITRIPTYLINE HYDROCHLORIDE 75 MG: 50 TABLET, FILM COATED ORAL at 21:27

## 2019-08-03 RX ADMIN — EPOETIN ALFA-EPBX 10000 UNITS: 10000 INJECTION, SOLUTION INTRAVENOUS; SUBCUTANEOUS at 21:27

## 2019-08-03 RX ADMIN — GEMFIBROZIL 300 MG: 600 TABLET ORAL at 10:05

## 2019-08-03 RX ADMIN — FERRIC CITRATE 420 MG: 210 TABLET, COATED ORAL at 18:06

## 2019-08-03 RX ADMIN — LACTULOSE 30 ML: 20 SOLUTION ORAL at 17:33

## 2019-08-03 RX ADMIN — SENNOSIDES, DOCUSATE SODIUM 2 TABLET: 50; 8.6 TABLET, FILM COATED ORAL at 10:12

## 2019-08-03 RX ADMIN — HYDROXYCHLOROQUINE SULFATE 400 MG: 200 TABLET, FILM COATED ORAL at 07:17

## 2019-08-03 RX ADMIN — PREDNISONE 10 MG: 5 TABLET ORAL at 10:05

## 2019-08-03 RX ADMIN — DICYCLOMINE HYDROCHLORIDE 20 MG: 20 TABLET ORAL at 04:28

## 2019-08-03 RX ADMIN — CARVEDILOL 25 MG: 12.5 TABLET, FILM COATED ORAL at 17:33

## 2019-08-03 RX ADMIN — SODIUM CHLORIDE 10 ML: 9 INJECTION INTRAMUSCULAR; INTRAVENOUS; SUBCUTANEOUS at 22:00

## 2019-08-03 RX ADMIN — LACTULOSE 30 ML: 20 SOLUTION ORAL at 10:08

## 2019-08-03 RX ADMIN — MORPHINE SULFATE 2 MG: 2 INJECTION, SOLUTION INTRAMUSCULAR; INTRAVENOUS at 12:04

## 2019-08-03 RX ADMIN — AZATHIOPRINE 50 MG: 50 TABLET ORAL at 10:08

## 2019-08-03 NOTE — PROGRESS NOTES
Hospitalist Progress Note  Kem Roberts MD  Answering service: [phone] OR [phone] from in house phone      Date of Service:  8/3/2019  NAME:  Steven Harper                                                         :  1985                                               MRN:  [id number]    Brief admission summary   The patient is a 78-year-old female with past medical history of anemia; carditis; hypercholesteremia; lupus; chronic pain syndrome; ESRD, on dialysis; thromboembolism; and hypertension, who presents from home via EMS with complaints of abdominal pain and vomiting. Abdominal pain started four days ago accompanied by constipation. Subjective/interval history    -She had more abdominal pain and thinks her belly feels hard;no nausea or vomiting. Assessment and plan   Partial SBO most likely due to constipation,but adhesion is a possibility as well  -bening managed conservatively and clinically improving  -GI and surgery onboard.  -NPO. KUB    Hematemesis  -Hb stable  -EGD fairly unremarkable  -PPI bid    Accelerated hypertension  -BP improved with dialysis. Continue amlodipine,carvedilo,losartan and minoxidil     SLE:,no evidence of active disease: continue azathioprine,hydroxychloroquine and prednisone. ESRD on HD  -access: right arm AVF  -Schedule TTS    Anemia due to chronic disease(esrd,lupus etc)  -Hb stable. Diet:NPO for EGD,resume clears and advance after EGD  Code status:full  DVT prophylaxis:scd  SUP:ppi bid  Disposition:anticipate home  Plan of care discussed with:patient,nurse. Current facility administered and prior to admit medications reviewed. x         Review of Systems:  A comprehensive review of systems was negative except for that written in the HPI. PHYSICAL EXAM:  O:  Visit Vitals  BP (!) 165/118   Pulse 94   Temp 98.2 °F (36.8 °C)   Resp 18   Wt 85.5 kg (188 lb 8 oz)   SpO2 93%   Breastfeeding?  No   BMI 31.37 kg/m²       General Appears comfortable,not in distress. HEENT Head atraumatic. Unicteric sclera. Moist buccal mucosa. PERRLA     CVS RRR, no MRG, no JVD, no peripheral edema       Chest No deformity  No accessory muscle use  Vesicular air entry symmetrically  Scattered  wheezing,ronchi or crepitations       Abdomen &Pelvis Non distended. Non tender. Normoactive.small midline surgical scar. Genitourinary  No CVA or suprapubic tenderness       Musculoskeletal R arm AVF+thrill. Skin No erythema,rash,depigmentation       Neurology Mental status: alert and oriented x4  Cranial nerves: CN 2-12 intact.   Motor 5/5 through out     Psychiatry            Intake/Output Summary (Last 24 hours) at 8/3/2019 1123  Last data filed at 8/2/2019 1716  Gross per 24 hour   Intake 220 ml   Output --   Net 220 ml          Recent labs & imaging reviewed:    Problem List as of 8/3/2019 Date Reviewed: 7/31/2019          Codes Class Noted - Resolved    * (Principal) SBO (small bowel obstruction) ([facility] ICD-10-CM: Q43.705  ICD-9-CM: 560.9  7/31/2019 - Present        HCAP (healthcare-associated pneumonia) ICD-10-CM: J18.9  ICD-9-CM: 486  7/16/2019 - Present        Anemia due to blood loss ICD-10-CM: D50.0  ICD-9-CM: 280.0  4/21/2019 - Present        Vaginal bleeding ICD-10-CM: N93.9  ICD-9-CM: 623.8  4/18/2019 - Present        Hyperkalemia ICD-10-CM: E87.5  ICD-9-CM: 276.7  3/4/2019 - Present        ESRD on hemodialysis ([facility].) ICD-10-CM: N18.6, Z99.2  ICD-9-CM: 585.6, V45.11  8/27/2018 - Present        SOB (shortness of breath) ICD-10-CM: R06.02  ICD-9-CM: 786.05  8/15/2018 - Present        Rib pain on right side ICD-10-CM: R07.81  ICD-9-CM: 786.50  7/2/2018 - Present        Troponin level elevated ICD-10-CM: R74.8  ICD-9-CM: 790.6  6/17/2018 - Present        Intra-abdominal abscess ([facility].) ICD-10-CM: K65.1  ICD-9-CM: 567.22  5/12/2018 - Present        Sepsis ([facility] 75.) ICD-10-CM: A41.9  ICD-9-CM: 038.9, 995.91  4/29/2018 - Present        Generalized abdominal pain ICD-10-CM: R10.84  ICD-9-CM: 789.07  4/4/2018 - Present        Other constipation ICD-10-CM: K59.09  ICD-9-CM: 564.09  4/4/2018 - Present        Other ascites ICD-10-CM: R18.8  ICD-9-CM: 789.59  4/4/2018 - Present        Peritonitis ([facility] ICD-10-CM: K65.9  ICD-9-CM: 567.9  3/11/2018 - Present        History of pulmonary embolism ICD-10-CM: F99.150  ICD-9-CM: V12.55  12/8/2017 - Present        Hypomagnesemia ICD-10-CM: E83.42  ICD-9-CM: 275.2  10/30/2017 - Present        Hypocalcemia ICD-10-CM: E83.51  ICD-9-CM: 275.41  10/30/2017 - Present        Hypokalemia ICD-10-CM: E87.6  ICD-9-CM: 276.8  10/27/2017 - Present        Hypertension (Chronic) ICD-10-CM: I10  ICD-9-CM: 401.9  10/14/2017 - Present        Chronic bilateral low back pain without sciatica ICD-10-CM: M54.5, G89.29  ICD-9-CM: 724.2, 338.29  5/26/2017 - Present        Anemia of renal disease ICD-10-CM: N18.9, D63.1  ICD-9-CM: 285.21  2/21/2017 - Present        Dependence on peritoneal dialysis [facility]) ICD-10-CM: Z99.2  ICD-9-CM: V45.11  2/21/2017 - Present        ACP (advance care planning) ICD-10-CM: Z71.89  ICD-9-CM: V65.49  2/21/2017 - Present        Malignant hypertension ICD-10-CM: I10  ICD-9-CM: 401.0  7/11/2016 - Present        Encounter for monitoring opioid maintenance therapy ICD-10-CM: Z51.81, Z79.891  ICD-9-CM: V58.83, V58.69  11/3/2015 - Present        Lupus nephritis ([facility].) ICD-10-CM: M32.14  ICD-9-CM: 710.0, 583.81  3/10/2012 - Present        Lupus ICD-10-CM: M32.9  ICD-9-CM: 710.0  2/27/2012 - Present        RESOLVED: Neutropenia ([facility] ICD-10-CM: D70.9  ICD-9-CM: 288.00  9/15/2018 - 9/19/2018        RESOLVED: Anemia in chronic kidney disease ICD-10-CM: N18.9, D63.1  ICD-9-CM: 285.21  9/15/2018 - 9/19/2018        RESOLVED: Malignant hypertensive urgency ICD-10-CM: I16.0  ICD-9-CM: 401.0  9/10/2018 - 9/19/2018        RESOLVED: Hypertensive urgency ICD-10-CM: I16.0  ICD-9-CM: 401.9  7/19/2018 - 7/22/2018 RESOLVED: Sepsis ([facility] ICD-10-CM: A41.9  ICD-9-CM: 038.9, 995.91  12/12/2017 - 12/22/2017        RESOLVED: Pneumonia ICD-10-CM: J18.9  ICD-9-CM: 768  10/14/2017 - 12/8/2017        RESOLVED: Pleural effusion, left ICD-10-CM: J90  ICD-9-CM: 511.9  10/14/2017 - 12/8/2017        RESOLVED: ESRD on peritoneal dialysis ([facility] (Chronic) ICD-10-CM: N18.6, Z99.2  ICD-9-CM: 585.6, V45.11  10/7/2016 - 8/27/2018        RESOLVED: Idiopathic chronic inflammatory bowel disease ICD-10-CM: K63.89  ICD-9-CM: 558.9  8/28/2013 - 8/28/2013        RESOLVED: Abdominal pain ICD-10-CM: R10.9  ICD-9-CM: 789.00  8/28/2013 - 9/3/2013        RESOLVED: Hypoxia ICD-10-CM: R09.02  ICD-9-CM: 799.02  4/25/2013 - 4/30/2013        RESOLVED: Lupus nephritis ([facility] ICD-10-CM: M32.14  ICD-9-CM: 710.0, 583.81  4/27/2012 - 4/28/2012                Isidoro Karimi MD  Internal Medicine  Date of Service: 8/3/2019

## 2019-08-03 NOTE — PROGRESS NOTES
Patient lost IV access this morning. Attempted to place 24g IVs while utilizing vein finder, no success. Attempted alternative resources. CCU, IV team, and critical transport all unavailable to assist with IV insertion. Paged nighttime hospitalist Nay Alves NP. Received verbal orders to leave IV out for now, and re-assess IV access in the daytime. Bedside shift change report given to Carlene Ward (oncoming nurse) by Silvino Mena (offgoing nurse). Report included the following information SBAR, Kardex and MAR.

## 2019-08-03 NOTE — PROGRESS NOTES
Patient name: Tatiana Moreno  MRN: [id number]    Nephrology Progress note:    Assessment:  ESRD: TTS [facility]  HTN: Uncontrolled-> often related to interdialytic fluid gains  Partial SBO: 2 to constipation /maybe adhesions. 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 Will see again on Monday, but please call with questions/changes      Subjective:  Denies any more N/V. No abd pain    The patient was seen on dialysis at 1:25 PM .  BP is stable. Access is working well. ROS:   No nausea, no vomiting  No chest pain, no shortness of breath    Exam:  Visit Vitals  /69 (BP 1 Location: Left arm, BP Patient Position: At rest)   Pulse 82   Temp 98.2 °F (36.8 °C)   Resp 18   Wt 85.5 kg (188 lb 8 oz)   SpO2 93%   Breastfeeding? No   BMI 31.37 kg/m²     Wt Readings from Last 3 Encounters:   08/03/19 85.5 kg (188 lb 8 oz)   07/30/19 88 kg (194 lb 0.1 oz)   07/28/19 90.4 kg (199 lb 4.7 oz)       Intake/Output Summary (Last 24 hours) at 8/3/2019 0714  Last data filed at 8/2/2019 1716  Gross per 24 hour   Intake 220 ml   Output --   Net 220 ml       Gen: NAD  HEENT: No icterus, mmm, no oral exudate, AT/NC  Lungs/Chest wall: Clear. No accessory muscle use. Cardiovascular: Regular rate, normal rhythm. Abdomen/: Soft, NT, ND, BS+. No palpable organomegaly. Ext: No significant peripheral edema.  R AVF        Current Facility-Administered Medications   Medication Dose Route Frequency Last Dose    epoetin pia-epbx (RETACRIT) injection 10,000 Units  10,000 Units SubCUTAneous DIALYSIS TUE, THU & SAT      morphine injection 1-2 mg  1-2 mg IntraVENous Q4H PRN 2 mg at 08/02/19 1814    lactulose (CHRONULAC) 10 gram/15 mL solution 30 mL  20 g Oral BID 30 mL at 08/02/19 1800    senna-docusate (PERICOLACE) 8.6-50 mg per tablet 2 Tab  2 Tab Oral DAILY Stopped at 08/02/19 0900    cloNIDine HCl (CATAPRES) tablet 0.1 mg  0.1 mg Oral Q6H PRN      amitriptyline (ELAVIL) tablet 75 mg  75 mg Oral QHS 75 mg at 08/02/19 2106    amLODIPine (NORVASC) tablet 10 mg  10 mg Oral DAILY Stopped at 08/02/19 0900    azaTHIOprine (IMURAN) tablet 50 mg  50 mg Oral DAILY AFTER BREAKFAST Stopped at 08/02/19 0900    acetaminophen (TYLENOL) tablet 1,000 mg  1,000 mg Oral Q6H PRN      carvedilol (COREG) tablet 25 mg  25 mg Oral BID WITH MEALS 25 mg at 08/02/19 1801    dicyclomine (BENTYL) tablet 20 mg  20 mg Oral Q6H 20 mg at 08/03/19 0428    ferric citrate (AURYXIA) tablet 420 mg  420 mg Oral TID WITH MEALS 420 mg at 08/02/19 1810    gemfibrozil (LOPID) tablet 300 mg  300 mg Oral DAILY Stopped at 08/02/19 0900    hydroxychloroquine (PLAQUENIL) tablet 400 mg  400 mg Oral DAILY WITH BREAKFAST 400 mg at 08/02/19 0721    losartan (COZAAR) tablet 100 mg  100 mg Oral DAILY Stopped at 08/02/19 0900    minoxidil (LONITEN) tablet 2.5 mg  2.5 mg Oral DAILY Stopped at 08/02/19 0900    oxyCODONE-acetaminophen (PERCOCET) 5-325 mg per tablet 1 Tab  1 Tab Oral Q8H PRN 1 Tab at 08/01/19 1803    predniSONE (DELTASONE) tablet 10 mg  10 mg Oral DAILY Stopped at 08/02/19 0900    sodium chloride (NS) flush 5-40 mL  5-40 mL IntraVENous Q8H 10 mL at 08/02/19 2107    sodium chloride (NS) flush 5-40 mL  5-40 mL IntraVENous PRN      0.9% sodium chloride infusion  50 mL/hr IntraVENous CONTINUOUS 50 mL/hr at 08/02/19 1812    ondansetron (ZOFRAN) injection 4 mg  4 mg IntraVENous Q4H PRN  pantoprazole (PROTONIX) 40 mg in sodium chloride 0.9% 10 mL injection  40 mg IntraVENous Q12H 40 mg at 08/02/19 2106    bisacodyl (DULCOLAX) suppository 10 mg  10 mg Rectal DAILY PRN      albuterol-ipratropium (DUO-NEB) 2.5 MG-0.5 MG/3 ML  3 mL Nebulization Q4H PRN         Labs/Data:    Lab Results   Component Value Date/Time    WBC 3.6 08/02/2019 03:57 AM    Hemoglobin (POC) 7.8 (L) 01/19/2018 12:30 PM    HGB 8.7 (L) 08/02/2019 03:57 AM    Hematocrit (POC) 23 (L) 01/19/2018 12:30 PM    HCT 28.7 (L) 08/02/2019 03:57 AM    PLATELET 289 18/91/5405 03:57 AM    MCV 95.0 08/02/2019 03:57 AM       Lab Results   Component Value Date/Time    Sodium 136 08/02/2019 03:57 AM    Potassium 4.7 08/02/2019 03:57 AM    Chloride 101 08/02/2019 03:57 AM    CO2 24 08/02/2019 03:57 AM    Anion gap 11 08/02/2019 03:57 AM    Glucose 76 08/02/2019 03:57 AM    BUN 29 (H) 08/02/2019 03:57 AM    Creatinine 7.06 (H) 08/02/2019 03:57 AM    BUN/Creatinine ratio 4 (L) 08/02/2019 03:57 AM    GFR est AA 8 (L) 08/02/2019 03:57 AM    GFR est non-AA 7 (L) 08/02/2019 03:57 AM    Calcium 8.7 08/02/2019 03:57 AM       Patient seen and examined. Chart reviewed. Labs, data and other pertinent notes reviewed in last 24 hrs.     Discussed with patient    Signed by:  Neil Santos MD  [id number]

## 2019-08-03 NOTE — PROGRESS NOTES
Progress Note    Patient: Hernando Marshall MRN: [id number]  SSN: [id number]    YOB: 1985  Age: 36 y.o. Sex: female      Admit Date: 2019    1 Day Post-Op    Procedure:  Procedure(s):  ESOPHAGOGASTRODUODENOSCOPY (EGD)    Subjective:     Patient says she was having more abdominal pain earlier which is improving. She denies any flatus. Objective:     Visit Vitals  /80   Pulse 96   Temp 98.4 °F (36.9 °C) (Oral)   Resp 16   Wt 188 lb 8 oz (85.5 kg)   SpO2 95%   Breastfeeding? No   BMI 31.37 kg/m²       Temp (24hrs), Av.2 °F (36.8 °C), Min:98.1 °F (36.7 °C), Max:98.4 °F (36.9 °C)      Physical Exam:    Gen- Alert in NAD  Lungs-CTA  H-RRR  Abd-S/ mild tenderness no rebound or guarding. Minimal bowel sounds    Data Review: images and reports reviewed    Lab Review: All lab results for the last 24 hours reviewed. Assessment:     Hospital Problems  Date Reviewed: 2019          Codes Class Noted POA    * (Principal) [facility] ICD-10-CM: O35.012  ICD-9-CM: 560.9  2019 Unknown              Plan/Recommendations/Medical Decision Making:   Continue current bowel regimen  Would wait for bowel function to begin diet. OOB and ambulate.

## 2019-08-03 NOTE — DIALYSIS
HD TRANSFER - OUT REPORT:    Verbal report given to FEDERICA Arango RN on Angella Jimenez being transferred to  for routine progression of care       Report consisted of patient's Situation, Background, Assessment and Recommendations(SBAR). Information from the following report(s) Procedure Summary was reviewed with the receiving nurse.    $$ Method: Hemodialysis (08/03/19 1340)    NET Fluid Removed (mL): 1800 ml (08/03/19 1615)     Patient response to treatment:  Stable    Hemodialysis End Time: 8847 (08/03/19 1615)  If not documented, dialysis nurse to update post-dialysis row in HD/Filtration flowsheet     Medications /Volume expansion agents or Fluid boluses administered during treatment? no    Post-dialysis medication administration due?  no  Remind nurse to administer post-HD medication upon return to unit. Fistula hemostasis? yes    Opportunity for questions and clarification was provided.       Patient transported with: Drync

## 2019-08-03 NOTE — PROCEDURES
[facility] Team [facility]  [phone]    Vitals   Pre   Post   Assessment   Pre   Post     Temp  Temp: 98.1 °F (36.7 °C) (08/03/19 1340)  98.4 LOC  A&Ox4 Alert   HR   Pulse (Heart Rate): 84 (08/03/19 1340) 96 Lungs   Clear  CTA   B/P   BP: (!) 142/94 (08/03/19 1340) 122/80 Cardiac   S1S2 RRR   Resp   Resp Rate: 16 (08/03/19 1340) 16 Skin   Warm, dry Warm, dry   Pain level  Pain Intensity 1: 2 (08/03/19 1234) 0 Edema  None   No changes   Orders:    Duration:   Start:    1340 End:   1640 Total:   3 hrs   Dialyzer:   Dialyzer/Set Up Inspection: Revaclear (08/03/19 1340)   K Bath:   Dialysate K (mEq/L): 2 (08/03/19 1340)   Ca Bath:   Dialysate CA (mEq/L): 2.5 (08/03/19 1340)   Na/Bicarb:   Dialysate NA (mEq/L): 140 (08/03/19 1340)   Target Fluid Removal:   Goal/Amount of Fluid to Remove (mL): 2000 mL (08/03/19 1340)   Access     Type & Location:   PETER AV   Labs     Obtained/Reviewed   Critical Results Called   Date when labs were drawn-  Hgb-    HGB   Date Value Ref Range Status   08/02/2019 8.7 (L) 11.5 - 16.0 g/dL Final     K-    Potassium   Date Value Ref Range Status   08/02/2019 4.7 3.5 - 5.1 mmol/L Final     Comment:     SPECIMEN HEMOLYZED, RESULTS MAY BE AFFECTED     Ca-   Calcium   Date Value Ref Range Status   08/02/2019 8.7 8.5 - 10.1 MG/DL Final     Bun-   BUN   Date Value Ref Range Status   08/02/2019 29 (H) 6 - 20 MG/DL Final     Comment:     INVESTIGATED PER DELTA CHECK PROTOCOL     Creat-   Creatinine   Date Value Ref Range Status   08/02/2019 7.06 (H) 0.55 - 1.02 MG/DL Final     Comment:     INVESTIGATED PER DELTA CHECK PROTOCOL      Medications/ Blood Products Given     Name   Dose   Route and Time     None given                Blood Volume Processed (BVP):    57.3 L Net Fluid   Removed:  1800 mL   Comments   Time Out Done: 13:20  Primary Nurse Rpt Pre: FEDERICA Douglas, RN  Primary Nurse Rpt Post: FEDERICA Douglas RN  Pt Education: Access precautions  Care Plan: HD today, continue TTS schedule  Tx Summary:   1320: Safety checks complete. Timeout performed. 1325: McNeer at bedside for exam and to discuss plan of care. JAZMYN AVG assesses, no redness, warmth or drainage noted, +bruit/+thrill. Prepped skin per procedure using alcohol x 60 sec. Cannulated with 15 g needles each site and secured with tape. HD initiated. Immediately, high arterial pressures. Arterial access assess, unable to draw back. 1340: Re-cannulated arterial access with 15 g needle. HD initiated. AP much improved. Access visible, lines secure. Medications reviewed. 1605: Venous pressures >260. BFR adjusted to achieve optimal .  1615: Venous pressures >300. Clot visible in venous chamber. Immediately rinsed back blood. HD treatment discontinued. De-cannulated and pressure held until hemostasis achieved. Dressing applied. +bruit/+thrill. VSS, patient tolerated HD well. SBAR called to primary RN.     Admitting Diagnosis: SBO  Pt's [facility] clinic: [facility]  Informed Consent Verified: Yes (08/03/19 1340)  Crystalita Consent: Verified  Hepatitis Status: HBsAg: Neg (07/31/2019) HBsAb: 18/immune (07/02/2019) obtained from [facility] Number: [id number] (08/03/19 1340)

## 2019-08-03 NOTE — PROGRESS NOTES
Bedside shift change report given to Leslee Spatz (oncoming nurse) by Doris Dugan RN (offgoing nurse). Report included the following information SBAR.

## 2019-08-04 PROCEDURE — 74011250636 HC RX REV CODE- 250/636: Performed by: INTERNAL MEDICINE

## 2019-08-04 PROCEDURE — 74011250637 HC RX REV CODE- 250/637: Performed by: HOSPITALIST

## 2019-08-04 PROCEDURE — 65270000029 HC RM PRIVATE

## 2019-08-04 PROCEDURE — 74011000250 HC RX REV CODE- 250: Performed by: INTERNAL MEDICINE

## 2019-08-04 PROCEDURE — 74011636637 HC RX REV CODE- 636/637: Performed by: INTERNAL MEDICINE

## 2019-08-04 PROCEDURE — 74011250636 HC RX REV CODE- 250/636: Performed by: NURSE PRACTITIONER

## 2019-08-04 PROCEDURE — C9113 INJ PANTOPRAZOLE SODIUM, VIA: HCPCS | Performed by: INTERNAL MEDICINE

## 2019-08-04 PROCEDURE — 74011250637 HC RX REV CODE- 250/637: Performed by: INTERNAL MEDICINE

## 2019-08-04 RX ADMIN — SODIUM CHLORIDE 10 ML: 9 INJECTION INTRAMUSCULAR; INTRAVENOUS; SUBCUTANEOUS at 22:52

## 2019-08-04 RX ADMIN — OXYCODONE AND ACETAMINOPHEN 1 TABLET: 5; 325 TABLET ORAL at 13:09

## 2019-08-04 RX ADMIN — FERRIC CITRATE 420 MG: 210 TABLET, COATED ORAL at 07:01

## 2019-08-04 RX ADMIN — AMLODIPINE BESYLATE 10 MG: 5 TABLET ORAL at 09:57

## 2019-08-04 RX ADMIN — FERRIC CITRATE 420 MG: 210 TABLET, COATED ORAL at 17:26

## 2019-08-04 RX ADMIN — MORPHINE SULFATE 1 MG: 2 INJECTION, SOLUTION INTRAMUSCULAR; INTRAVENOUS at 00:03

## 2019-08-04 RX ADMIN — MINOXIDIL 2.5 MG: 2.5 TABLET ORAL at 09:57

## 2019-08-04 RX ADMIN — MORPHINE SULFATE 2 MG: 2 INJECTION, SOLUTION INTRAMUSCULAR; INTRAVENOUS at 23:01

## 2019-08-04 RX ADMIN — DICYCLOMINE HYDROCHLORIDE 20 MG: 20 TABLET ORAL at 13:06

## 2019-08-04 RX ADMIN — CARVEDILOL 25 MG: 12.5 TABLET, FILM COATED ORAL at 07:01

## 2019-08-04 RX ADMIN — AZATHIOPRINE 50 MG: 50 TABLET ORAL at 10:02

## 2019-08-04 RX ADMIN — LOSARTAN POTASSIUM 100 MG: 50 TABLET ORAL at 09:57

## 2019-08-04 RX ADMIN — SODIUM CHLORIDE 10 ML: 9 INJECTION INTRAMUSCULAR; INTRAVENOUS; SUBCUTANEOUS at 06:00

## 2019-08-04 RX ADMIN — MORPHINE SULFATE 2 MG: 2 INJECTION, SOLUTION INTRAMUSCULAR; INTRAVENOUS at 17:08

## 2019-08-04 RX ADMIN — CARVEDILOL 25 MG: 12.5 TABLET, FILM COATED ORAL at 17:08

## 2019-08-04 RX ADMIN — SENNOSIDES, DOCUSATE SODIUM 2 TABLET: 50; 8.6 TABLET, FILM COATED ORAL at 09:57

## 2019-08-04 RX ADMIN — SODIUM CHLORIDE 40 MG: 9 INJECTION INTRAMUSCULAR; INTRAVENOUS; SUBCUTANEOUS at 22:52

## 2019-08-04 RX ADMIN — AMITRIPTYLINE HYDROCHLORIDE 75 MG: 50 TABLET, FILM COATED ORAL at 22:52

## 2019-08-04 RX ADMIN — GEMFIBROZIL 300 MG: 600 TABLET ORAL at 10:02

## 2019-08-04 RX ADMIN — LACTULOSE 30 ML: 20 SOLUTION ORAL at 17:08

## 2019-08-04 RX ADMIN — LACTULOSE 30 ML: 20 SOLUTION ORAL at 09:57

## 2019-08-04 RX ADMIN — PREDNISONE 10 MG: 5 TABLET ORAL at 09:57

## 2019-08-04 RX ADMIN — DICYCLOMINE HYDROCHLORIDE 20 MG: 20 TABLET ORAL at 02:16

## 2019-08-04 RX ADMIN — FERRIC CITRATE 420 MG: 210 TABLET, COATED ORAL at 13:06

## 2019-08-04 RX ADMIN — SODIUM CHLORIDE 40 MG: 9 INJECTION INTRAMUSCULAR; INTRAVENOUS; SUBCUTANEOUS at 09:57

## 2019-08-04 RX ADMIN — MORPHINE SULFATE 2 MG: 2 INJECTION, SOLUTION INTRAMUSCULAR; INTRAVENOUS at 10:02

## 2019-08-04 RX ADMIN — HYDROXYCHLOROQUINE SULFATE 400 MG: 200 TABLET, FILM COATED ORAL at 07:01

## 2019-08-04 RX ADMIN — DICYCLOMINE HYDROCHLORIDE 20 MG: 20 TABLET ORAL at 19:05

## 2019-08-04 RX ADMIN — DICYCLOMINE HYDROCHLORIDE 20 MG: 20 TABLET ORAL at 07:01

## 2019-08-04 NOTE — PROGRESS NOTES
Progress Note    Patient: Steven Harper MRN: [id number]  SSN: [id number]    YOB: 1985  Age: 36 y.o. Sex: female      Admit Date: 2019    2 Days Post-Op    Procedure:  Procedure(s):  ESOPHAGOGASTRODUODENOSCOPY (EGD)    Subjective:     Patient complaining of some abdominal pain. NO nausea but also no flatus yet. Objective:     Visit Vitals  /78 (BP 1 Location: Left arm, BP Patient Position: At rest)   Pulse 89   Temp 98 °F (36.7 °C)   Resp 16   Wt 184 lb 9.6 oz (83.7 kg)   SpO2 95%   Breastfeeding? No   BMI 30.72 kg/m²       Temp (24hrs), Av.2 °F (36.8 °C), Min:98 °F (36.7 °C), Max:98.3 °F (36.8 °C)      Physical Exam:    Gen- Alert in NAD  Lungs- CTA  H-RRR  Abd soft with mild tenderness. Excellent bowel sounds    Data Review: images and reports reviewed    Lab Review: All lab results for the last 24 hours reviewed. No results found for this or any previous visit (from the past 24 hour(s)). Assessment:     Hospital Problems  Date Reviewed: 2019          Codes Class Noted POA    * (Principal) [facility] ICD-10-CM: M68.999  ICD-9-CM: 560.9  2019 Unknown              Plan/Recommendations/Medical Decision Making: Will try a few sips of liquids as she has no nausea and vomiting and has excellent bowel sounds. If nausea returns would continue NPO.

## 2019-08-05 ENCOUNTER — APPOINTMENT (OUTPATIENT)
Dept: GENERAL RADIOLOGY | Age: 34
DRG: 388 | End: 2019-08-05
Attending: SURGERY
Payer: MEDICARE

## 2019-08-05 PROCEDURE — 74011250636 HC RX REV CODE- 250/636: Performed by: INTERNAL MEDICINE

## 2019-08-05 PROCEDURE — 65270000029 HC RM PRIVATE

## 2019-08-05 PROCEDURE — 74011250636 HC RX REV CODE- 250/636: Performed by: NURSE PRACTITIONER

## 2019-08-05 PROCEDURE — 74011250637 HC RX REV CODE- 250/637: Performed by: HOSPITALIST

## 2019-08-05 PROCEDURE — C9113 INJ PANTOPRAZOLE SODIUM, VIA: HCPCS | Performed by: INTERNAL MEDICINE

## 2019-08-05 PROCEDURE — 74011636637 HC RX REV CODE- 636/637: Performed by: INTERNAL MEDICINE

## 2019-08-05 PROCEDURE — 74019 RADEX ABDOMEN 2 VIEWS: CPT

## 2019-08-05 PROCEDURE — 74011000250 HC RX REV CODE- 250: Performed by: INTERNAL MEDICINE

## 2019-08-05 PROCEDURE — 74011250637 HC RX REV CODE- 250/637: Performed by: INTERNAL MEDICINE

## 2019-08-05 RX ORDER — FACIAL-BODY WIPES
10 EACH TOPICAL ONCE
Status: COMPLETED | OUTPATIENT
Start: 2019-08-05 | End: 2019-08-05

## 2019-08-05 RX ADMIN — AZATHIOPRINE 50 MG: 50 TABLET ORAL at 09:30

## 2019-08-05 RX ADMIN — HYDROXYCHLOROQUINE SULFATE 400 MG: 200 TABLET, FILM COATED ORAL at 07:01

## 2019-08-05 RX ADMIN — SENNOSIDES, DOCUSATE SODIUM 2 TABLET: 50; 8.6 TABLET, FILM COATED ORAL at 09:27

## 2019-08-05 RX ADMIN — MORPHINE SULFATE 2 MG: 2 INJECTION, SOLUTION INTRAMUSCULAR; INTRAVENOUS at 23:14

## 2019-08-05 RX ADMIN — GEMFIBROZIL 300 MG: 600 TABLET ORAL at 09:27

## 2019-08-05 RX ADMIN — LOSARTAN POTASSIUM 100 MG: 50 TABLET ORAL at 09:27

## 2019-08-05 RX ADMIN — PREDNISONE 10 MG: 5 TABLET ORAL at 09:27

## 2019-08-05 RX ADMIN — FERRIC CITRATE 420 MG: 210 TABLET, COATED ORAL at 08:00

## 2019-08-05 RX ADMIN — DICYCLOMINE HYDROCHLORIDE 20 MG: 20 TABLET ORAL at 07:01

## 2019-08-05 RX ADMIN — AMLODIPINE BESYLATE 10 MG: 5 TABLET ORAL at 09:27

## 2019-08-05 RX ADMIN — MORPHINE SULFATE 2 MG: 2 INJECTION, SOLUTION INTRAMUSCULAR; INTRAVENOUS at 17:17

## 2019-08-05 RX ADMIN — DICYCLOMINE HYDROCHLORIDE 20 MG: 20 TABLET ORAL at 15:41

## 2019-08-05 RX ADMIN — BISACODYL 10 MG: 10 SUPPOSITORY RECTAL at 15:41

## 2019-08-05 RX ADMIN — ONDANSETRON 4 MG: 2 INJECTION INTRAMUSCULAR; INTRAVENOUS at 03:47

## 2019-08-05 RX ADMIN — SODIUM CHLORIDE 40 MG: 9 INJECTION INTRAMUSCULAR; INTRAVENOUS; SUBCUTANEOUS at 09:28

## 2019-08-05 RX ADMIN — CARVEDILOL 25 MG: 12.5 TABLET, FILM COATED ORAL at 17:11

## 2019-08-05 RX ADMIN — AMITRIPTYLINE HYDROCHLORIDE 75 MG: 50 TABLET, FILM COATED ORAL at 21:04

## 2019-08-05 RX ADMIN — SODIUM CHLORIDE 10 ML: 9 INJECTION INTRAMUSCULAR; INTRAVENOUS; SUBCUTANEOUS at 08:21

## 2019-08-05 RX ADMIN — ONDANSETRON 4 MG: 2 INJECTION INTRAMUSCULAR; INTRAVENOUS at 12:03

## 2019-08-05 RX ADMIN — CARVEDILOL 25 MG: 12.5 TABLET, FILM COATED ORAL at 07:01

## 2019-08-05 RX ADMIN — MORPHINE SULFATE 2 MG: 2 INJECTION, SOLUTION INTRAMUSCULAR; INTRAVENOUS at 08:21

## 2019-08-05 RX ADMIN — SODIUM CHLORIDE 10 ML: 9 INJECTION INTRAMUSCULAR; INTRAVENOUS; SUBCUTANEOUS at 21:05

## 2019-08-05 RX ADMIN — MORPHINE SULFATE 2 MG: 2 INJECTION, SOLUTION INTRAMUSCULAR; INTRAVENOUS at 03:47

## 2019-08-05 RX ADMIN — MINOXIDIL 2.5 MG: 2.5 TABLET ORAL at 09:27

## 2019-08-05 RX ADMIN — SODIUM CHLORIDE 40 MG: 9 INJECTION INTRAMUSCULAR; INTRAVENOUS; SUBCUTANEOUS at 21:04

## 2019-08-05 RX ADMIN — SODIUM CHLORIDE 10 ML: 9 INJECTION INTRAMUSCULAR; INTRAVENOUS; SUBCUTANEOUS at 14:00

## 2019-08-05 RX ADMIN — DICYCLOMINE HYDROCHLORIDE 20 MG: 20 TABLET ORAL at 19:00

## 2019-08-05 NOTE — PROGRESS NOTES
Bedside, Verbal shift change report given to Felix Wellington RN (oncoming nurse) by Brii Sams RN (offgoing nurse). Report included the following information SBAR, Kardex, Procedure Summary, Intake/Output, MAR and Recent Results.

## 2019-08-05 NOTE — PROGRESS NOTES
General Surgery Daily Progress Note    Admit Date: 2019  Post-Operative Day: 3 Days Post-Op from Procedure(s):  ESOPHAGOGASTRODUODENOSCOPY (EGD)     Subjective:     Last 24 hrs: Pt w/ flatus and feels like she will have a BM soon. Says she is on a renal diet - CC says sips of clears   OOB    Objective:     Blood pressure 122/72, pulse 95, temperature 98.6 °F (37 °C), resp. rate 16, weight 184 lb 9.6 oz (83.7 kg), SpO2 91 %, not currently breastfeeding. Temp (24hrs), Av.4 °F (36.9 °C), Min:98.2 °F (36.8 °C), Max:98.6 °F (37 °C)      _____________________  Physical Exam:     Alert and Oriented, x3, in no acute distress. Cardiovascular: RRR, no peripheral edema  Abdomen: soft, nl BS, mild periumbilical tenderness      Assessment:   Principal Problem:    SBO (small bowel obstruction) ([facility] (2019)            Plan:     Renal diet  Cont OOB  Dialysis per schedule  Further plans per medicine    Data Review:    No results for input(s): WBC, HGB, HCT, PLT, HGBEXT, HCTEXT, PLTEXT in the last 72 hours. No results for input(s): NA, K, CL, CO2, GLU, BUN, CREA, CA, MG, PHOS, ALB, TBIL, SGOT, ALT, INR in the last 72 hours. No lab exists for component: INREXT  No results for input(s): AML, LPSE in the last 72 hours.         ______________________  Medications:    Current Facility-Administered Medications   Medication Dose Route Frequency    epoetin pia-epbx (RETACRIT) injection 10,000 Units  10,000 Units SubCUTAneous DIALYSIS TUE, THU & SAT    morphine injection 1-2 mg  1-2 mg IntraVENous Q4H PRN    lactulose (CHRONULAC) 10 gram/15 mL solution 30 mL  20 g Oral BID    senna-docusate (PERICOLACE) 8.6-50 mg per tablet 2 Tab  2 Tab Oral DAILY    cloNIDine HCl (CATAPRES) tablet 0.1 mg  0.1 mg Oral Q6H PRN    amitriptyline (ELAVIL) tablet 75 mg  75 mg Oral QHS    amLODIPine (NORVASC) tablet 10 mg  10 mg Oral DAILY    azaTHIOprine (IMURAN) tablet 50 mg  50 mg Oral DAILY AFTER BREAKFAST    acetaminophen (TYLENOL) tablet 1,000 mg  1,000 mg Oral Q6H PRN    carvedilol (COREG) tablet 25 mg  25 mg Oral BID WITH MEALS    dicyclomine (BENTYL) tablet 20 mg  20 mg Oral Q6H    ferric citrate (AURYXIA) tablet 420 mg  420 mg Oral TID WITH MEALS    gemfibrozil (LOPID) tablet 300 mg  300 mg Oral DAILY    hydroxychloroquine (PLAQUENIL) tablet 400 mg  400 mg Oral DAILY WITH BREAKFAST    losartan (COZAAR) tablet 100 mg  100 mg Oral DAILY    minoxidil (LONITEN) tablet 2.5 mg  2.5 mg Oral DAILY    oxyCODONE-acetaminophen (PERCOCET) 5-325 mg per tablet 1 Tab  1 Tab Oral Q8H PRN    predniSONE (DELTASONE) tablet 10 mg  10 mg Oral DAILY    sodium chloride (NS) flush 5-40 mL  5-40 mL IntraVENous Q8H    sodium chloride (NS) flush 5-40 mL  5-40 mL IntraVENous PRN    ondansetron (ZOFRAN) injection 4 mg  4 mg IntraVENous Q4H PRN    pantoprazole (PROTONIX) 40 mg in sodium chloride 0.9% 10 mL injection  40 mg IntraVENous Q12H    bisacodyl (DULCOLAX) suppository 10 mg  10 mg Rectal DAILY PRN    albuterol-ipratropium (DUO-NEB) 2.5 MG-0.5 MG/3 ML  3 mL Nebulization Q4H PRN       Anthony Crowley NP  8/5/2019    I have independently examined the patient and have reviewed the chart. I agree with the above plan. This morning had some N/V, on sips of clears now, some mild pain, KUB today.   Continue on sips and see how she improves, labs in the am.    Trista Mckeon MD

## 2019-08-05 NOTE — PROGRESS NOTES
1730: [facility] called to ask if the patient was being discharged tomorrow and if not they will be here in the morning to do dialysis on the patient. I informed them that she will not be discharged and will be here.

## 2019-08-05 NOTE — ROUTINE PROCESS
Bedside and Verbal shift change report given to [address] (oncoming nurse) by Lindsay Lisa RN (offgoing nurse). Report included the following information SBAR, Kardex, Intake/Output, MAR and Recent Results.

## 2019-08-05 NOTE — PROGRESS NOTES
Hospitalist Progress Note  Salvador Weinstein MD  Answering service: [phone] OR 1971 from in house phone      Date of Service:  2019  NAME:  Sean Lu                                                         :  1985                                               MRN:  [id number]    Brief admission summary   The patient is a 28-year-old female with past medical history of anemia; carditis; hypercholesteremia; lupus; chronic pain syndrome; ESRD, on dialysis; thromboembolism; and hypertension, who presents from home via EMS with complaints of abdominal pain and vomiting. Abdominal pain started four days ago accompanied by constipation. Subjective/interval history    -She had more abdominal pain yesterday,8/3 and had to be placed back on NPO.KUB showed increased distension.  -She is vomiting bilious material.Lunch tray in the room,she has not eaten it. Advised her to stay NPO,ordered. Assessment and plan   Partial SBO most likely due to constipation,but adhesion is a possibility as well  -bening managed conservatively and clinically improving  -GI and surgery onboard.  -Repeat KUB on 8/3 showed increased distension. NPO until bowel function resumes. I don't think she needs NGT: no nausea or vomiting,abdomen not that distended. -NPO  -:vomiting. Consider NGT if vomiting recurs. Hematemesis  -Hb stable  -EGD fairly unremarkable  -PPI bid    Accelerated hypertension  -BP improved with dialysis. Continue amlodipine,carvedilo,losartan and minoxidil     SLE:,no evidence of active disease: continue azathioprine,hydroxychloroquine and prednisone. ESRD on HD  -access: right arm AVF  -Schedule TTS    Anemia due to chronic disease(esrd,lupus etc)  -Hb stable. Diet:NPO for EGD  Code status:full  DVT prophylaxis:scd  SUP:ppi bid  Disposition:anticipate home  Plan of care discussed with:patient,nurse.           Current facility administered and prior to admit medications reviewed. x         Review of Systems:  A comprehensive review of systems was negative except for that written in the HPI. PHYSICAL EXAM:  O:  Visit Vitals  /90 (BP 1 Location: Left arm, BP Patient Position: Supine)   Pulse 84   Temp 98.1 °F (36.7 °C)   Resp 16   Wt 83.7 kg (184 lb 9.6 oz)   SpO2 94%   Breastfeeding? No   BMI 30.72 kg/m²       General Appears comfortable,not in distress. HEENT Head atraumatic. Unicteric sclera. Moist buccal mucosa. PERRLA     CVS RRR, no MRG, no JVD, no peripheral edema       Chest No deformity  No accessory muscle use  Vesicular air entry symmetrically         Abdomen &Pelvis Non distended. Non tender. Hypoactive.small midline surgical scar. Genitourinary  No CVA or suprapubic tenderness       Musculoskeletal R arm AVF+thrill. Skin No erythema,rash,depigmentation       Neurology Mental status: alert and oriented x4  Cranial nerves: CN 2-12 intact.   Motor 5/5 through out     Psychiatry            Intake/Output Summary (Last 24 hours) at 8/5/2019 1241  Last data filed at 8/5/2019 1204  Gross per 24 hour   Intake 0 ml   Output 200 ml   Net -200 ml          Recent labs & imaging reviewed:    Problem List as of 8/5/2019 Date Reviewed: 7/31/2019          Codes Class Noted - Resolved    * (Principal) SBO (small bowel obstruction) ([facility] ICD-10-CM: Q12.917  ICD-9-CM: 560.9  7/31/2019 - Present        HCAP (healthcare-associated pneumonia) ICD-10-CM: J18.9  ICD-9-CM: 486  7/16/2019 - Present        Anemia due to blood loss ICD-10-CM: D50.0  ICD-9-CM: 280.0  4/21/2019 - Present        Vaginal bleeding ICD-10-CM: N93.9  ICD-9-CM: 623.8  4/18/2019 - Present        Hyperkalemia ICD-10-CM: E87.5  ICD-9-CM: 276.7  3/4/2019 - Present        ESRD on hemodialysis ([facility] ICD-10-CM: N18.6, Z99.2  ICD-9-CM: 585.6, V45.11  8/27/2018 - Present        SOB (shortness of breath) ICD-10-CM: R06.02  ICD-9-CM: 786.05  8/15/2018 - Present        Rib pain on right side ICD-10-CM: R07.81  ICD-9-CM: 786.50  7/2/2018 - Present        Troponin level elevated ICD-10-CM: R74.8  ICD-9-CM: 790.6  6/17/2018 - Present        Intra-abdominal abscess ([facility] ICD-10-CM: K65.1  ICD-9-CM: 567.22  5/12/2018 - Present        Sepsis ([facility] ICD-10-CM: A41.9  ICD-9-CM: 038.9, 995.91  4/29/2018 - Present        Generalized abdominal pain ICD-10-CM: R10.84  ICD-9-CM: 789.07  4/4/2018 - Present        Other constipation ICD-10-CM: K59.09  ICD-9-CM: 564.09  4/4/2018 - Present        Other ascites ICD-10-CM: R18.8  ICD-9-CM: 789.59  4/4/2018 - Present        Peritonitis ([facility] ICD-10-CM: K65.9  ICD-9-CM: 567.9  3/11/2018 - Present        History of pulmonary embolism ICD-10-CM: G11.688  ICD-9-CM: V12.55  12/8/2017 - Present        Hypomagnesemia ICD-10-CM: E83.42  ICD-9-CM: 275.2  10/30/2017 - Present        Hypocalcemia ICD-10-CM: E83.51  ICD-9-CM: 275.41  10/30/2017 - Present        Hypokalemia ICD-10-CM: E87.6  ICD-9-CM: 276.8  10/27/2017 - Present        Hypertension (Chronic) ICD-10-CM: I10  ICD-9-CM: 401.9  10/14/2017 - Present        Chronic bilateral low back pain without sciatica ICD-10-CM: M54.5, G89.29  ICD-9-CM: 724.2, 338.29  5/26/2017 - Present        Anemia of renal disease ICD-10-CM: N18.9, D63.1  ICD-9-CM: 285.21  2/21/2017 - Present        Dependence on peritoneal dialysis [facility]) ICD-10-CM: Z99.2  ICD-9-CM: V45.11  2/21/2017 - Present        ACP (advance care planning) ICD-10-CM: Z71.89  ICD-9-CM: V65.49  2/21/2017 - Present        Malignant hypertension ICD-10-CM: I10  ICD-9-CM: 401.0  7/11/2016 - Present        Encounter for monitoring opioid maintenance therapy ICD-10-CM: Z51.81, Z79.891  ICD-9-CM: V58.83, V58.69  11/3/2015 - Present        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  ICD-9-CM: 710.0, 583.81  3/10/2012 - Present        Lupus ICD-10-CM: M32.9  ICD-9-CM: 710.0  2/27/2012 - Present        RESOLVED: Neutropenia ([facility] ICD-10-CM: D70.9  ICD-9-CM: 288.00  9/15/2018 - 9/19/2018 RESOLVED: Anemia in chronic kidney disease ICD-10-CM: N18.9, D63.1  ICD-9-CM: 285.21  9/15/2018 - 9/19/2018        RESOLVED: Malignant hypertensive urgency ICD-10-CM: I16.0  ICD-9-CM: 401.0  9/10/2018 - 9/19/2018        RESOLVED: Hypertensive urgency ICD-10-CM: I16.0  ICD-9-CM: 401.9  7/19/2018 - 7/22/2018        RESOLVED: Sepsis ([facility] ICD-10-CM: A41.9  ICD-9-CM: 038.9, 995.91  12/12/2017 - 12/22/2017        RESOLVED: Pneumonia ICD-10-CM: J18.9  ICD-9-CM: 486  10/14/2017 - 12/8/2017        RESOLVED: Pleural effusion, left ICD-10-CM: J90  ICD-9-CM: 511.9  10/14/2017 - 12/8/2017        RESOLVED: ESRD on peritoneal dialysis ([facility] (Chronic) ICD-10-CM: N18.6, Z99.2  ICD-9-CM: 585.6, V45.11  10/7/2016 - 8/27/2018        RESOLVED: Idiopathic chronic inflammatory bowel disease ICD-10-CM: K63.89  ICD-9-CM: 558.9  8/28/2013 - 8/28/2013        RESOLVED: Abdominal pain ICD-10-CM: R10.9  ICD-9-CM: 789.00  8/28/2013 - 9/3/2013        RESOLVED: Hypoxia ICD-10-CM: R09.02  ICD-9-CM: 799.02  4/25/2013 - 4/30/2013        RESOLVED: 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

## 2019-08-05 NOTE — PROGRESS NOTES
RENAL  PROGRESS NOTE        Subjective: On the phone    Objective:   VITALS SIGNS:    Visit Vitals  /72 (BP 1 Location: Left arm)   Pulse 95   Temp 98.6 °F (37 °C)   Resp 16   Wt 83.7 kg (184 lb 9.6 oz)   SpO2 91%   Breastfeeding? No   BMI 30.72 kg/m²       O2 Device: Room air       Temp (24hrs), Av.4 °F (36.9 °C), Min:98.2 °F (36.8 °C), Max:98.6 °F (37 °C)         PHYSICAL EXAM:    NAD  DATA REVIEW:     INTAKE / OUTPUT:   Last shift:      No intake/output data recorded. Last 3 shifts: No intake/output data recorded. Intake/Output Summary (Last 24 hours) at 2019 0943  Last data filed at 2019 1808  Gross per 24 hour   Intake 0 ml   Output --   Net 0 ml         LABS:   No results for input(s): WBC, HGB, HCT, PLT, HGBEXT, HCTEXT, PLTEXT in the last 72 hours. No results for input(s): NA, K, CL, CO2, GLU, BUN, CREA, CA, MG, PHOS, ALB, TBIL, TBILI, SGOT, ALT, INR in the last 72 hours. No lab exists for component: INREXT        Assessment:     ESRD: TTS [facility]  HTN: Uncontrolled-> often related to interdialytic fluid gains  Partial SBO: 2 to constipation /maybe adhesions.  Clinically improving  Anemia 2 to ESRD: Hgb just below goal  Hematemesis  SLE      Plan:   HD in AM  Labs in AM  Suzanna Jorge MD

## 2019-08-06 LAB
ANION GAP SERPL CALC-SCNC: 12 MMOL/L (ref 5–15)
BUN SERPL-MCNC: 63 MG/DL (ref 6–20)
BUN/CREAT SERPL: 6 (ref 12–20)
CALCIUM SERPL-MCNC: 8.5 MG/DL (ref 8.5–10.1)
CHLORIDE SERPL-SCNC: 99 MMOL/L (ref 97–108)
CO2 SERPL-SCNC: 24 MMOL/L (ref 21–32)
CREAT SERPL-MCNC: 11 MG/DL (ref 0.55–1.02)
ERYTHROCYTE [DISTWIDTH] IN BLOOD BY AUTOMATED COUNT: 14.6 % (ref 11.5–14.5)
GLUCOSE SERPL-MCNC: 67 MG/DL (ref 65–100)
HCT VFR BLD AUTO: 29.5 % (ref 35–47)
HGB BLD-MCNC: 9 G/DL (ref 11.5–16)
MCH RBC QN AUTO: 28.2 PG (ref 26–34)
MCHC RBC AUTO-ENTMCNC: 30.5 G/DL (ref 30–36.5)
MCV RBC AUTO: 92.5 FL (ref 80–99)
NRBC # BLD: 0 K/UL (ref 0–0.01)
NRBC BLD-RTO: 0 PER 100 WBC
PHOSPHATE SERPL-MCNC: 8.1 MG/DL (ref 2.6–4.7)
PLATELET # BLD AUTO: 294 K/UL (ref 150–400)
PMV BLD AUTO: 9.8 FL (ref 8.9–12.9)
POTASSIUM SERPL-SCNC: 4.6 MMOL/L (ref 3.5–5.1)
RBC # BLD AUTO: 3.19 M/UL (ref 3.8–5.2)
SODIUM SERPL-SCNC: 135 MMOL/L (ref 136–145)
WBC # BLD AUTO: 5.7 K/UL (ref 3.6–11)

## 2019-08-06 PROCEDURE — 74011000250 HC RX REV CODE- 250: Performed by: INTERNAL MEDICINE

## 2019-08-06 PROCEDURE — 84100 ASSAY OF PHOSPHORUS: CPT

## 2019-08-06 PROCEDURE — 85027 COMPLETE CBC AUTOMATED: CPT

## 2019-08-06 PROCEDURE — C9113 INJ PANTOPRAZOLE SODIUM, VIA: HCPCS | Performed by: INTERNAL MEDICINE

## 2019-08-06 PROCEDURE — 74011250636 HC RX REV CODE- 250/636: Performed by: INTERNAL MEDICINE

## 2019-08-06 PROCEDURE — 74011250637 HC RX REV CODE- 250/637: Performed by: INTERNAL MEDICINE

## 2019-08-06 PROCEDURE — 90935 HEMODIALYSIS ONE EVALUATION: CPT

## 2019-08-06 PROCEDURE — 36415 COLL VENOUS BLD VENIPUNCTURE: CPT

## 2019-08-06 PROCEDURE — 74011250636 HC RX REV CODE- 250/636: Performed by: NURSE PRACTITIONER

## 2019-08-06 PROCEDURE — 80048 BASIC METABOLIC PNL TOTAL CA: CPT

## 2019-08-06 PROCEDURE — 74011250637 HC RX REV CODE- 250/637: Performed by: HOSPITALIST

## 2019-08-06 PROCEDURE — 65270000029 HC RM PRIVATE

## 2019-08-06 RX ADMIN — CARVEDILOL 25 MG: 12.5 TABLET, FILM COATED ORAL at 17:12

## 2019-08-06 RX ADMIN — SODIUM CHLORIDE 40 MG: 9 INJECTION INTRAMUSCULAR; INTRAVENOUS; SUBCUTANEOUS at 20:51

## 2019-08-06 RX ADMIN — DICYCLOMINE HYDROCHLORIDE 20 MG: 20 TABLET ORAL at 19:04

## 2019-08-06 RX ADMIN — DICYCLOMINE HYDROCHLORIDE 20 MG: 20 TABLET ORAL at 03:27

## 2019-08-06 RX ADMIN — MORPHINE SULFATE 2 MG: 2 INJECTION, SOLUTION INTRAMUSCULAR; INTRAVENOUS at 13:12

## 2019-08-06 RX ADMIN — MORPHINE SULFATE 2 MG: 2 INJECTION, SOLUTION INTRAMUSCULAR; INTRAVENOUS at 03:27

## 2019-08-06 RX ADMIN — LACTULOSE 30 ML: 20 SOLUTION ORAL at 17:12

## 2019-08-06 RX ADMIN — DICYCLOMINE HYDROCHLORIDE 20 MG: 20 TABLET ORAL at 13:12

## 2019-08-06 RX ADMIN — AMITRIPTYLINE HYDROCHLORIDE 75 MG: 50 TABLET, FILM COATED ORAL at 21:00

## 2019-08-06 RX ADMIN — MORPHINE SULFATE 2 MG: 2 INJECTION, SOLUTION INTRAMUSCULAR; INTRAVENOUS at 19:04

## 2019-08-06 RX ADMIN — SODIUM CHLORIDE 10 ML: 9 INJECTION INTRAMUSCULAR; INTRAVENOUS; SUBCUTANEOUS at 22:00

## 2019-08-06 RX ADMIN — EPOETIN ALFA-EPBX 10000 UNITS: 10000 INJECTION, SOLUTION INTRAVENOUS; SUBCUTANEOUS at 20:51

## 2019-08-06 RX ADMIN — SODIUM CHLORIDE 10 ML: 9 INJECTION INTRAMUSCULAR; INTRAVENOUS; SUBCUTANEOUS at 13:13

## 2019-08-06 NOTE — PROGRESS NOTES
HD TRANSFER - OUT REPORT:    Verbal report given to Angela Larios RN  (Name) on Angella Jimenez being transferred to [facility]  (Unit) for routine progression of care       Report consisted of patient's Situation, Background, Assessment and   Recommendations(SBAR). Information from the following report(s) SBAR, Kardex and Intake/Output was reviewed with the receiving nurse. Method:  $$ Method: Hemodialysis (08/06/19 0740)    Fluid Removed  NET Fluid Removed (mL): 2000 ml (08/06/19 1200)     Patient response to treatment:  Stable    End Time  Hemodialysis End Time: 1200 (08/06/19 1200)  If not documented, dialysis nurse to update post-dialysis row in HD/Filtration flowsheet     Medications /Volume expansion agents or Fluid boluses administered during treatment? no    Post-dialysis medication administration due?  no  Remind nurse to administer post-HD medication upon return to unit. Fistula hemostasis? yes    Line heparinization? no    Lines: N/A    Opportunity for questions and clarification was provided.       Patient transported with: [employer]

## 2019-08-06 NOTE — PROGRESS NOTES
NUTRITION COMPLETE ASSESSMENT    RECOMMENDATIONS:   1. Diet advancement per surgery  2. Once on full liquids add Nepro BID  3. Weekly weights     Interventions/Plan:   Food/Nutrient Delivery:           RD to follow for diet advancemetn    Assessment:   Reason for Assessment: [x]At Nutrition Risk/NPO/clears    Diet: NPO  Supplements: none  Nutritionally Significant Medications: [x] Reviewed & Includes: coreg, bentyl, retacrit, lopid, lactulose, minoxidil, protonix, prednisone, pericolace   Meal Intake:   Patient Vitals for the past 100 hrs:   % Diet Eaten   08/04/19 1808 0 %   08/04/19 1740 0 %   08/04/19 0900 0 %   08/03/19 0900 100 %     Subjective: I'd like to do more than just clears. Objective:  Pt admitted for SBO. PMHx: Lupus, ESRD on HD, previous PD prior to peritonitis, HF, hypercholesterolemia, GERD, others noted. Constipation and vomiting x 4 days PTA. EGD on 8/2 showing gastritis. Surgery following with CT showing ileus vs pSBO. Some flatus and abd pain improved. Plans for repeat KUB tomorrow with sips of clears. Pt visited today. Wanting to advance diet. Reports eating well prior to symtoms but will still drink Nepro as needed. Once on fulls please add Nepro BID. If unable to advance diet in next 2-3 days may need to consider nutrition support, hopeful that will be able to advance diet since passing gas. Low wt of 62kg last May noted but has since regained wt. Will continue to follow for diet vs nutrition support. Estimated Nutrition Needs:   Kcals/day: 2100 Kcals/day  Protein: 100 g(100-118g (1.2-1.4g/kg))  Fluid: 2100 ml(or per renal)  Based On: Kcal/kg - specify (Comment)(25kcal/kg)  Weight Used: Actual wt(84kg)    Pt expected to meet estimated nutrient needs:  []   Yes     []  No [x] Unable to predict at this time  Nutrition Diagnosis:   1. Inadequate oral intake related to altered GI fx as evidenced by SBO vs ileus; NPO/clears x 5+ days    2.  (Increased protein/energy needs ) related to ESRd as evidenced by on HD    Goals:     Diet advancement to at least full liquids in 2-3 days or start of nutrition support     Monitoring & Evaluation:    - Total energy intake, Liquid meal replacement, Protein intake   - Lean body mass, fat free mass, GI    Previous Nutrition Goals Met:   N/A  Previous Recommendations:    N/A    Education & Discharge Needs:   [x] None Identified   [] Identified and addressed    [x] Participated in care plan, discharge planning, and/or interdisciplinary rounds        Cultural, Zoroastrianism and ethnic food preferences identified: None    Skin Integrity: [x]Intact  []Other  Edema: [x]None []Other  Last BM: 7/31 - hard  Food Allergies: [x]None []Other  Diet Restrictions: Cultural/Yazidism Preference(s): None     Anthropometrics:    Weight Loss Metrics 8/6/2019 7/30/2019 7/28/2019 7/27/2019 7/22/2019 7/17/2019 6/24/2019   Today's Wt 184 lb 14.4 oz 194 lb 0.1 oz 199 lb 4.7 oz 199 lb 11.8 oz 204 lb 9.4 oz 204 lb 9.4 oz 191 lb   BMI 30.77 kg/m2 32.28 kg/m2 33.16 kg/m2 33.24 kg/m2 34.05 kg/m2 34.05 kg/m2 31.78 kg/m2      Weight Source: Standing scale (comment)  Height: 5' 5\" (165.1 cm),    Body mass index is 30.77 kg/m².      IBW : 56.7 kg (125 lb), % IBW (Calculated): 147.68 %   ,      Labs:    Lab Results   Component Value Date/Time    Sodium 135 (L) 08/06/2019 07:45 AM    Potassium 4.6 08/06/2019 07:45 AM    Chloride 99 08/06/2019 07:45 AM    CO2 24 08/06/2019 07:45 AM    Glucose 67 08/06/2019 07:45 AM    BUN 63 (H) 08/06/2019 07:45 AM    Creatinine 11.00 (H) 08/06/2019 07:45 AM    Calcium 8.5 08/06/2019 07:45 AM    Magnesium 2.4 08/02/2019 03:57 AM    Phosphorus 8.1 (H) 08/06/2019 07:45 AM    Albumin 2.9 (L) 08/02/2019 03:57 AM     Lab Results   Component Value Date/Time    Hemoglobin A1c 5.4 09/25/2015 02:02 PM     Alberto Yap RD [address] , Pager #[phone] or [phone]

## 2019-08-06 NOTE — PROGRESS NOTES
Hospitalist Progress Note  Rock Samantha MD  Answering service: [phone] OR [phone] from in house phone      Date of Service:  2019  NAME:  Reagan Beavers                                                         :  1985                                               MRN:  [id number]    Brief admission summary   28-year-old female with past medical history significant for anemia; carditis; hypercholesteremia; lupus; chronic pain syndrome; ESRD, on dialysis; thromboembolism; and hypertension, was brought  from home via EMS with complaints of abdominal pain and vomiting. Abdominal pain started four days prior to admission. Patient also complained of associated constipation. CC: Abdominal pain. Saw patient on return from her hemodialysis session. C/O mild abdominal pain. Denied any nausea and vomiting    Assessment and plan   Resolving probable Partial SBO due to probable constipation and adhesions.  -Clinically improved. -Trial of sips. -Surgical F/U noted and appreciated. For a F/U KUB in am.    Hematemesis  -Hb stable  -EGD fairly unremarkable  -PPI bid  -GI eval noted and appreciated. Accelerated hypertension  -BP improved with dialysis. Continue amlodipine,carvedilo,losartan and minoxidil     SLE. No acute flare. continue azathioprine,hydroxychloroquine and prednisone. ESRD on HD  -access: right arm AVF  -Schedule TTS  -Nephrology F/U noted and appreciated. Anemia of chronicity.  -Hb stable. -Prophylaxis: GI and DVT. -Directives: Full Code. -Plan: Possible D/C in 2-3 days. D/W patient. Review of Systems:  A comprehensive review of systems was negative except for that written in the HPI. PHYSICAL EXAM:  O:  Visit Vitals  BP 93/64   Pulse (!) 104   Temp 97.8 °F (36.6 °C)   Resp 18   Ht 5' 5\" (1.651 m)   Wt 83.9 kg (184 lb 14.4 oz)   SpO2 98%   Breastfeeding?  No   BMI 30.77 kg/m²       General Appears comfortable,not in distress. HEENT Head atraumatic. Unicteric sclera. Moist buccal mucosa. PERRLA     CVS RRR, no MRG, no JVD, no peripheral edema       Chest No deformity  No accessory muscle use  Vesicular air entry symmetrically         Abdomen &Pelvis Non distended. Non tender. Hypoactive bowel sounds. small midline surgical scar. Genitourinary  No CVA or suprapubic tenderness       Musculoskeletal R arm AVF+thrill. Skin No erythema,rash,depigmentation       Neurology Mental status: alert and oriented x4  Cranial nerves: CN 2-12 intact.   Motor 5/5 through out     Psychiatry            Intake/Output Summary (Last 24 hours) at 8/6/2019 1811  Last data filed at 8/6/2019 1200  Gross per 24 hour   Intake --   Output 2000 ml   Net -2000 ml          Recent labs & imaging reviewed:    Problem List as of 8/6/2019 Date Reviewed: 7/31/2019          Codes Class Noted - Resolved    * (Principal) SBO (small bowel obstruction) ([facility] [place] [facility] ICD-10-CM: K08.314  ICD-9-CM: 560.9  7/31/2019 - Present        HCAP (healthcare-associated pneumonia) ICD-10-CM: J18.9  ICD-9-CM: 486  7/16/2019 - Present        Anemia due to blood loss ICD-10-CM: D50.0  ICD-9-CM: 280.0  4/21/2019 - Present        Vaginal bleeding ICD-10-CM: N93.9  ICD-9-CM: 623.8  4/18/2019 - Present        Hyperkalemia ICD-10-CM: E87.5  ICD-9-CM: 276.7  3/4/2019 - Present        ESRD on hemodialysis ([facility] [place] ICD-10-CM: N18.6, Z99.2  ICD-9-CM: 585.6, V45.11  8/27/2018 - Present        SOB (shortness of breath) ICD-10-CM: R06.02  ICD-9-CM: 786.05  8/15/2018 - Present        Rib pain on right side ICD-10-CM: R07.81  ICD-9-CM: 786.50  7/2/2018 - Present        Troponin level elevated ICD-10-CM: R74.8  ICD-9-CM: 790.6  6/17/2018 - Present        Intra-abdominal abscess ([facility] [place] [facility] ICD-10-CM: K65.1  ICD-9-CM: 567.22  5/12/2018 - Present        Sepsis ([facility] [place] [facility] ICD-10-CM: A41.9  ICD-9-CM: 038.9, 995.91  4/29/2018 - Present        Generalized abdominal pain ICD-10-CM: R10.84  ICD-9-CM: 789.07 4/4/2018 - Present        Other constipation ICD-10-CM: K59.09  ICD-9-CM: 564.09  4/4/2018 - Present        Other ascites ICD-10-CM: R18.8  ICD-9-CM: 789.59  4/4/2018 - Present        Peritonitis ([facility] ICD-10-CM: K65.9  ICD-9-CM: 567.9  3/11/2018 - Present        History of pulmonary embolism ICD-10-CM: P13.689  ICD-9-CM: V12.55  12/8/2017 - Present        Hypomagnesemia ICD-10-CM: E83.42  ICD-9-CM: 275.2  10/30/2017 - Present        Hypocalcemia ICD-10-CM: E83.51  ICD-9-CM: 275.41  10/30/2017 - Present        Hypokalemia ICD-10-CM: E87.6  ICD-9-CM: 276.8  10/27/2017 - Present        Hypertension (Chronic) ICD-10-CM: I10  ICD-9-CM: 401.9  10/14/2017 - Present        Chronic bilateral low back pain without sciatica ICD-10-CM: M54.5, G89.29  ICD-9-CM: 724.2, 338.29  5/26/2017 - Present        Anemia of renal disease ICD-10-CM: N18.9, D63.1  ICD-9-CM: 285.21  2/21/2017 - Present        Dependence on peritoneal dialysis [facility]) ICD-10-CM: Z99.2  ICD-9-CM: V45.11  2/21/2017 - Present        ACP (advance care planning) ICD-10-CM: Z71.89  ICD-9-CM: V65.49  2/21/2017 - Present        Malignant hypertension ICD-10-CM: I10  ICD-9-CM: 401.0  7/11/2016 - Present        Encounter for monitoring opioid maintenance therapy ICD-10-CM: Z51.81, Z79.891  ICD-9-CM: V58.83, V58.69  11/3/2015 - Present        Lupus nephritis ([facility] ICD-10-CM: M32.14  ICD-9-CM: 710.0, 583.81  3/10/2012 - Present        Lupus ICD-10-CM: M32.9  ICD-9-CM: 710.0  2/27/2012 - Present        RESOLVED: Neutropenia ([facility] ICD-10-CM: D70.9  ICD-9-CM: 288.00  9/15/2018 - 9/19/2018        RESOLVED: Anemia in chronic kidney disease ICD-10-CM: N18.9, D63.1  ICD-9-CM: 285.21  9/15/2018 - 9/19/2018        RESOLVED: Malignant hypertensive urgency ICD-10-CM: I16.0  ICD-9-CM: 401.0  9/10/2018 - 9/19/2018        RESOLVED: Hypertensive urgency ICD-10-CM: I16.0  ICD-9-CM: 401.9  7/19/2018 - 7/22/2018        RESOLVED: Sepsis ([facility] ICD-10-CM: A41.9  ICD-9-CM: 038.9, 995.91 12/12/2017 - 12/22/2017        RESOLVED: Pneumonia ICD-10-CM: J18.9  ICD-9-CM: 486  10/14/2017 - 12/8/2017        RESOLVED: Pleural effusion, left ICD-10-CM: J90  ICD-9-CM: 511.9  10/14/2017 - 12/8/2017        RESOLVED: ESRD on peritoneal dialysis ([facility] (Chronic) ICD-10-CM: N18.6, Z99.2  ICD-9-CM: 585.6, V45.11  10/7/2016 - 8/27/2018        RESOLVED: Idiopathic chronic inflammatory bowel disease ICD-10-CM: K63.89  ICD-9-CM: 558.9  8/28/2013 - 8/28/2013        RESOLVED: Abdominal pain ICD-10-CM: R10.9  ICD-9-CM: 789.00  8/28/2013 - 9/3/2013        RESOLVED: Hypoxia ICD-10-CM: R09.02  ICD-9-CM: 799.02  4/25/2013 - 4/30/2013        RESOLVED: Lupus nephritis ([facility] ICD-10-CM: M32.14  ICD-9-CM: 710.0, 583.81  4/27/2012 - 4/28/2012                Sourav Avery MD  Internal Medicine  Date of Service: 8/6/2019

## 2019-08-06 NOTE — PROGRESS NOTES
Bedside and Verbal shift change report given to [facility] (oncoming nurse) by Davonte Najera RN (offgoing nurse). Report included the following information SBAR, Kardex, Procedure Summary, Intake/Output, MAR and Recent Results.

## 2019-08-06 NOTE — PROGRESS NOTES
TRANSFER - IN REPORT:    Verbal report received from Dany 27 (name) on Angella Jimenez  being received from [facility] surgery, room # 512 (unit) for routine progression of care      Report consisted of patients Situation, Background, Assessment and   Recommendations(SBAR). Information from the following report(s) SBAR, Kardex and Intake/Output was reviewed with the receiving nurse. Opportunity for questions and clarification was provided. Assessment completed upon patients arrival to unit and care assumed.

## 2019-08-06 NOTE — PROGRESS NOTES
Progress Note    Patient: Carla Luis MRN: [id number]  SSN: [id number]    YOB: 1985  Age: 36 y.o. Sex: female      Admit Date: 2019    4 Days Post-Op    Procedure:  Procedure(s):  ESOPHAGOGASTRODUODENOSCOPY (EGD)    Subjective:     Patient feeling much better today. No Nausea. No BM yet. Objective:     Visit Vitals  BP 93/64   Pulse 94   Temp 98.5 °F (36.9 °C)   Resp 17   Wt 184 lb 14.4 oz (83.9 kg)   SpO2 100%   Breastfeeding? No   BMI 30.77 kg/m²       Temp (24hrs), Av.3 °F (36.8 °C), Min:97.9 °F (36.6 °C), Max:98.7 °F (37.1 °C)      Physical Exam:    Gen- Alert in NAD  Abd-S/nt/nd    Data Review: images and reports reviewed  AXR- Moderate amount of stool in the right colon. Persistent small bowel  obstruction. Lab Review: All lab results for the last 24 hours reviewed.   Recent Results (from the past 24 hour(s))   CBC W/O DIFF    Collection Time: 19  7:45 AM   Result Value Ref Range    WBC 5.7 3.6 - 11.0 K/uL    RBC 3.19 (L) 3.80 - 5.20 M/uL    HGB 9.0 (L) 11.5 - 16.0 g/dL    HCT 29.5 (L) 35.0 - 47.0 %    MCV 92.5 80.0 - 99.0 FL    MCH 28.2 26.0 - 34.0 PG    MCHC 30.5 30.0 - 36.5 g/dL    RDW 14.6 (H) 11.5 - 14.5 %    PLATELET 574 832 - 654 K/uL    MPV 9.8 8.9 - 12.9 FL    NRBC 0.0 0  WBC    ABSOLUTE NRBC 0.00 0.00 - 0.11 K/uL   METABOLIC PANEL, BASIC    Collection Time: 19  7:45 AM   Result Value Ref Range    Sodium 135 (L) 136 - 145 mmol/L    Potassium 4.6 3.5 - 5.1 mmol/L    Chloride 99 97 - 108 mmol/L    CO2 24 21 - 32 mmol/L    Anion gap 12 5 - 15 mmol/L    Glucose 67 65 - 100 mg/dL    BUN 63 (H) 6 - 20 MG/DL    Creatinine 11.00 (H) 0.55 - 1.02 MG/DL    BUN/Creatinine ratio 6 (L) 12 - 20      GFR est AA 5 (L) >60 ml/min/1.73m2    GFR est non-AA 4 (L) >60 ml/min/1.73m2    Calcium 8.5 8.5 - 10.1 MG/DL   PHOSPHORUS    Collection Time: 19  7:45 AM   Result Value Ref Range    Phosphorus 8.1 (H) 2.6 - 4.7 MG/DL       Assessment:     Hospital Problems Date Reviewed: 7/31/2019          Codes Class Noted POA    * (Principal) SBO (small bowel obstruction) ([facility] ICD-10-CM: P17.667  ICD-9-CM: 560.9  7/31/2019 Unknown              Plan/Recommendations/Medical Decision Making:   Clinically seems to be improving  Will allow some sips of liquids today. Will get another xray tomorrow.

## 2019-08-06 NOTE — PROCEDURES
[facility] Team [facility]  [phone]    Vitals   Pre   Post   Assessment   Pre   Post     Temp  Temp: 98.7 °F (37.1 °C) (08/06/19 0735)  98.3   LOC  AXOX4 AXOX4    HR   Pulse (Heart Rate): 88 (08/06/19 0735) 100 Lungs   dim  dim   B/P   BP: 116/76 (08/06/19 0735) 102/67 Cardiac   Not on monitor  not on monitor   Resp   Resp Rate: 19 (08/06/19 0735) 18 Skin   CDI  CDI   Pain level  Pain Intensity 1: 7 (08/06/19 0343) 0/10 Edema  traced   traced   Orders:    Duration:   Start:    0740 End:    1200 Total:   3.5 hrs   Dialyzer:   Dialyzer/Set Up Inspection: Ting Ramos (08/06/19 0740)   K Bath:   Dialysate K (mEq/L): 2 (08/06/19 0740)   Ca Bath:   Dialysate CA (mEq/L): 2.5 (08/06/19 0740)   Na/Bicarb:   Dialysate NA (mEq/L): 140 (08/06/19 0740)   Target Fluid Removal:   Goal/Amount of Fluid to Remove (mL): 2000 mL (08/06/19 0740)   Access     Type & Location:   PEETR AVG: skin CDI. No s/s of infection. No issues with cannulation or hemostasis. Running well at .     Labs     Obtained/Reviewed   Critical Results Called   Date when labs were drawn-  Hgb-    HGB   Date Value Ref Range Status   08/02/2019 8.7 (L) 11.5 - 16.0 g/dL Final     K-    Potassium   Date Value Ref Range Status   08/02/2019 4.7 3.5 - 5.1 mmol/L Final     Comment:     SPECIMEN HEMOLYZED, RESULTS MAY BE AFFECTED     Ca-   Calcium   Date Value Ref Range Status   08/02/2019 8.7 8.5 - 10.1 MG/DL Final     Bun-   BUN   Date Value Ref Range Status   08/02/2019 29 (H) 6 - 20 MG/DL Final     Comment:     INVESTIGATED PER DELTA CHECK PROTOCOL     Creat-   Creatinine   Date Value Ref Range Status   08/02/2019 7.06 (H) 0.55 - 1.02 MG/DL Final     Comment:     INVESTIGATED PER DELTA CHECK PROTOCOL        Medications/ Blood Products Given     Name   Dose   Route and Time                     Blood Volume Processed (BVP):    66 L Net Fluid   Removed:  2000 ml   Comments   Time Out Done: yes  Primary Nurse Rpt Pre: Shirley Quispe RN  Primary Nurse Rpt Post: Tracy Pelaez RN  Pt Education: access care  Care Plan: on going  Tx Summary:   0720 Pt was transported to HD [address]. Consent signed & on file. SBAR received from Primary RN, [employer]. 0745 Pt cannulated with 55Z needles per policy & without issue. Labs drawn per request/ order. VSS. Dialysis Tx initiated. Pt running on , notified Md. Garcia. 0845: Pt access site is drawing well, blood clotting in venous chaimber  0910 Pt was started on new line. 1010 Line flushed with 200 cc NS.  1045 Venous chamber clotted. Notified Md. [address]  2205 Restarted tx on a new line. 1200: Tx ended. VSS. All possible blood returned to patient. Hemostasis achieved without issue. Bed locked and in the lowest position, call bell and belongings in reach. SBAR given to Primary, DANITZA Pelaez RN. Patient is stable at time of their departure. All Dialysis related medications have been reviewed. Admiting Diagnosis: small bowel obstraction  Pt's previous clinic- TTS [facility]   Consent signed - Informed Consent Verified: Yes (08/06/19 0740)  [facility] Consent - yes  Hepatitis Status- negative and susceptible 07/31/2019  Machine #- Machine Number: [id number] (08/06/19 0740)  Telemetry status- N/A  Pre-dialysis wt. -  N/A

## 2019-08-06 NOTE — PROGRESS NOTES
RENAL  PROGRESS NOTE        Subjective:   Some abd pain,seen and examined on HD    Objective:   VITALS SIGNS:    Visit Vitals  /76   Pulse 94   Temp 98.7 °F (37.1 °C) (Oral)   Resp 19   Wt 83.9 kg (184 lb 14.4 oz)   SpO2 96%   Breastfeeding? No   BMI 30.77 kg/m²       O2 Device: Room air       Temp (24hrs), Av.2 °F (36.8 °C), Min:97.9 °F (36.6 °C), Max:98.7 °F (37.1 °C)         PHYSICAL EXAM:    NAD  DATA REVIEW:     INTAKE / OUTPUT:   Last shift:      No intake/output data recorded. Last 3 shifts:  1901 -  0700  In: -   Out: 200     Intake/Output Summary (Last 24 hours) at 2019 0910  Last data filed at 2019 1204  Gross per 24 hour   Intake --   Output 200 ml   Net -200 ml         LABS:   Recent Labs     19  0745   WBC 5.7   HGB 9.0*   HCT 29.5*        Recent Labs     19  0745   *   K 4.6   CL 99   CO2 24   GLU 67   BUN 63*   CREA 11.00*   CA 8.5   PHOS 8.1*           Assessment:     ESRD: TTS [facility]  HTN: Uncontrolled-> often related to interdialytic fluid gains  Partial SBO: 2 to constipation /maybe adhesions.  Clinically improving  Anemia    Hematemesis  SLE      Plan:   HD   Will follow  Lito Wiley MD

## 2019-08-07 ENCOUNTER — APPOINTMENT (OUTPATIENT)
Dept: GENERAL RADIOLOGY | Age: 34
DRG: 388 | End: 2019-08-07
Attending: SURGERY
Payer: MEDICARE

## 2019-08-07 LAB
ALBUMIN SERPL-MCNC: 3 G/DL (ref 3.5–5)
ALBUMIN/GLOB SERPL: 0.6 {RATIO} (ref 1.1–2.2)
ALP SERPL-CCNC: 76 U/L (ref 45–117)
ALT SERPL-CCNC: 12 U/L (ref 12–78)
ANION GAP SERPL CALC-SCNC: 14 MMOL/L (ref 5–15)
AST SERPL-CCNC: 19 U/L (ref 15–37)
BILIRUB SERPL-MCNC: 0.3 MG/DL (ref 0.2–1)
BUN SERPL-MCNC: 30 MG/DL (ref 6–20)
BUN/CREAT SERPL: 4 (ref 12–20)
CALCIUM SERPL-MCNC: 8.7 MG/DL (ref 8.5–10.1)
CHLORIDE SERPL-SCNC: 100 MMOL/L (ref 97–108)
CO2 SERPL-SCNC: 19 MMOL/L (ref 21–32)
CREAT SERPL-MCNC: 7 MG/DL (ref 0.55–1.02)
GLOBULIN SER CALC-MCNC: 5 G/DL (ref 2–4)
GLUCOSE SERPL-MCNC: 54 MG/DL (ref 65–100)
POTASSIUM SERPL-SCNC: 4.2 MMOL/L (ref 3.5–5.1)
PROT SERPL-MCNC: 8 G/DL (ref 6.4–8.2)
SODIUM SERPL-SCNC: 133 MMOL/L (ref 136–145)

## 2019-08-07 PROCEDURE — 74011250637 HC RX REV CODE- 250/637: Performed by: INTERNAL MEDICINE

## 2019-08-07 PROCEDURE — 65270000029 HC RM PRIVATE

## 2019-08-07 PROCEDURE — C9113 INJ PANTOPRAZOLE SODIUM, VIA: HCPCS | Performed by: INTERNAL MEDICINE

## 2019-08-07 PROCEDURE — 80053 COMPREHEN METABOLIC PANEL: CPT

## 2019-08-07 PROCEDURE — 74011636637 HC RX REV CODE- 636/637: Performed by: INTERNAL MEDICINE

## 2019-08-07 PROCEDURE — 36415 COLL VENOUS BLD VENIPUNCTURE: CPT

## 2019-08-07 PROCEDURE — 74019 RADEX ABDOMEN 2 VIEWS: CPT

## 2019-08-07 PROCEDURE — 74011000250 HC RX REV CODE- 250: Performed by: INTERNAL MEDICINE

## 2019-08-07 PROCEDURE — 74011250636 HC RX REV CODE- 250/636: Performed by: NURSE PRACTITIONER

## 2019-08-07 PROCEDURE — 74011250637 HC RX REV CODE- 250/637: Performed by: HOSPITALIST

## 2019-08-07 PROCEDURE — 74011250636 HC RX REV CODE- 250/636: Performed by: INTERNAL MEDICINE

## 2019-08-07 PROCEDURE — 94760 N-INVAS EAR/PLS OXIMETRY 1: CPT

## 2019-08-07 RX ADMIN — HYDROXYCHLOROQUINE SULFATE 400 MG: 200 TABLET, FILM COATED ORAL at 10:05

## 2019-08-07 RX ADMIN — OXYCODONE AND ACETAMINOPHEN 1 TABLET: 5; 325 TABLET ORAL at 10:09

## 2019-08-07 RX ADMIN — DICYCLOMINE HYDROCHLORIDE 20 MG: 20 TABLET ORAL at 20:02

## 2019-08-07 RX ADMIN — AMITRIPTYLINE HYDROCHLORIDE 75 MG: 50 TABLET, FILM COATED ORAL at 22:35

## 2019-08-07 RX ADMIN — DICYCLOMINE HYDROCHLORIDE 20 MG: 20 TABLET ORAL at 02:11

## 2019-08-07 RX ADMIN — LOSARTAN POTASSIUM 100 MG: 50 TABLET ORAL at 13:30

## 2019-08-07 RX ADMIN — SODIUM CHLORIDE 40 MG: 9 INJECTION INTRAMUSCULAR; INTRAVENOUS; SUBCUTANEOUS at 10:21

## 2019-08-07 RX ADMIN — SENNOSIDES, DOCUSATE SODIUM 2 TABLET: 50; 8.6 TABLET, FILM COATED ORAL at 10:06

## 2019-08-07 RX ADMIN — LACTULOSE 30 ML: 20 SOLUTION ORAL at 19:01

## 2019-08-07 RX ADMIN — SODIUM CHLORIDE 10 ML: 9 INJECTION INTRAMUSCULAR; INTRAVENOUS; SUBCUTANEOUS at 17:36

## 2019-08-07 RX ADMIN — AMLODIPINE BESYLATE 10 MG: 5 TABLET ORAL at 13:30

## 2019-08-07 RX ADMIN — MORPHINE SULFATE 2 MG: 2 INJECTION, SOLUTION INTRAMUSCULAR; INTRAVENOUS at 04:26

## 2019-08-07 RX ADMIN — MORPHINE SULFATE 1 MG: 2 INJECTION, SOLUTION INTRAMUSCULAR; INTRAVENOUS at 17:36

## 2019-08-07 RX ADMIN — SODIUM CHLORIDE 40 MG: 9 INJECTION INTRAMUSCULAR; INTRAVENOUS; SUBCUTANEOUS at 22:34

## 2019-08-07 RX ADMIN — DICYCLOMINE HYDROCHLORIDE 20 MG: 20 TABLET ORAL at 10:08

## 2019-08-07 RX ADMIN — SODIUM CHLORIDE 10 ML: 9 INJECTION INTRAMUSCULAR; INTRAVENOUS; SUBCUTANEOUS at 04:27

## 2019-08-07 RX ADMIN — CARVEDILOL 25 MG: 12.5 TABLET, FILM COATED ORAL at 17:35

## 2019-08-07 RX ADMIN — MORPHINE SULFATE 2 MG: 2 INJECTION, SOLUTION INTRAMUSCULAR; INTRAVENOUS at 22:34

## 2019-08-07 RX ADMIN — GEMFIBROZIL 300 MG: 600 TABLET ORAL at 14:19

## 2019-08-07 RX ADMIN — FERRIC CITRATE 420 MG: 210 TABLET, COATED ORAL at 17:35

## 2019-08-07 RX ADMIN — AZATHIOPRINE 50 MG: 50 TABLET ORAL at 10:36

## 2019-08-07 RX ADMIN — FERRIC CITRATE 420 MG: 210 TABLET, COATED ORAL at 13:20

## 2019-08-07 RX ADMIN — MINOXIDIL 2.5 MG: 2.5 TABLET ORAL at 14:23

## 2019-08-07 RX ADMIN — LACTULOSE 30 ML: 20 SOLUTION ORAL at 10:18

## 2019-08-07 RX ADMIN — DICYCLOMINE HYDROCHLORIDE 20 MG: 20 TABLET ORAL at 14:19

## 2019-08-07 RX ADMIN — PREDNISONE 10 MG: 5 TABLET ORAL at 10:08

## 2019-08-07 NOTE — PROGRESS NOTES
8/7/19 -  SURJIT:  - Advanced to sips of clear liquids 8/6  - Plan for KUB today, 8/7  - Likely discharge in 1-2 days  CRM: Raysa Brown, MPH, [address] [place] [address]; Z: [phone]

## 2019-08-07 NOTE — PROGRESS NOTES
RENAL  PROGRESS NOTE        Subjective:   resting  Objective:   VITALS SIGNS:    Visit Vitals  /69   Pulse 85   Temp 98.1 °F (36.7 °C)   Resp 18   Ht 5' 5\" (1.651 m)   Wt 82.1 kg (181 lb)   SpO2 92%   Breastfeeding? No   BMI 30.12 kg/m²       O2 Device: Room air       Temp (24hrs), Av.2 °F (36.8 °C), Min:97.8 °F (36.6 °C), Max:98.5 °F (36.9 °C)         PHYSICAL EXAM:    resting  DATA REVIEW:     INTAKE / OUTPUT:   Last shift:      No intake/output data recorded. Last 3 shifts:  190 - 0700  In: -   Out:    No intake or output data in the 24 hours ending 19 1248      LABS:   Recent Labs     19  0745   WBC 5.7   HGB 9.0*   HCT 29.5*        Recent Labs     19  0240 19  0745   * 135*   K 4.2 4.6    99   CO2 19* 24   GLU 54* 67   BUN 30* 63*   CREA 7.00* 11.00*   CA 8.7 8.5   PHOS  --  8.1*   ALB 3.0*  --    TBILI 0.3  --    SGOT 19  --    ALT 12  --            Assessment:     ESRD: TTS [facility]  HTN: Uncontrolled-> often related to interdialytic fluid gains  Partial SBO: 2 to constipation /maybe adhesions.  Clinically improving  Anemia    Hematemesis  SLE      Plan:   HD  In AM  Will follow  Schuyler Lomeli MD

## 2019-08-07 NOTE — PROGRESS NOTES
Spiritual Care Partner Volunteer visited patient in room 512/01 on 8.07.19. Documented by: : Rev. Marcelo Rodriguez.  Marian Kaplan; [facility], to contact [phone] [address] Moise call: [phone]

## 2019-08-07 NOTE — PROGRESS NOTES
Hospitalist Progress Note  Patti Bernal MD  Answering service: [phone] from in house phone      Date of Service:  2019  NAME:  Carlos Dan                                                         :  1985                                               MRN:  [id number]    Brief admission summary   69-year-old female with past medical history significant for anemia; carditis; hypercholesteremia; lupus; chronic pain syndrome; ESRD, on dialysis; thromboembolism; and hypertension, was brought  from home via EMS with complaints of abdominal pain and vomiting. Abdominal pain started four days prior to admission. Patient also complained of associated constipation. CC: Abdominal pain. Patient stated that she felt moderately improved on am rounds. Increased flatus overnight    Assessment and plan   Resolving probable Partial SBO due to probable constipation and adhesions.  -Clinically improved. -Tolerating clears.  -Surgical F/U noted and appreciated. Hematemesis  -Hb stable  -EGD fairly unremarkable  -PPI bid  -GI eval noted and appreciated. Accelerated hypertension  -BP improved with dialysis. Continue amlodipine,carvedilo,losartan and minoxidil     SLE. No acute flare. continue azathioprine,hydroxychloroquine and prednisone. ESRD on HD  -access: right arm AVF  -Schedule TTS  -Nephrology F/U noted and appreciated. Anemia of chronicity.  -Hb stable. -Prophylaxis: GI and DVT. -Directives: Full Code. -Plan: Possible D/C in 1-2 days. D/W patient. Review of Systems:  A comprehensive review of systems was negative except for that written in the HPI. PHYSICAL EXAM:  O:  Visit Vitals  /75   Pulse 82   Temp 98.4 °F (36.9 °C)   Resp 18   Ht 5' 5\" (1.651 m)   Wt 82.1 kg (181 lb)   SpO2 93%   Breastfeeding? No   BMI 30.12 kg/m²       General Appears comfortable,not in distress. HEENT Head atraumatic. Unicteric sclera. Moist buccal mucosa. PERRLA     CVS RRR, no MRG, no JVD, no peripheral edema       Chest No deformity  No accessory muscle use  Vesicular air entry symmetrically         Abdomen &Pelvis Non distended. Non tender. Hypoactive bowel sounds. small midline surgical scar. Genitourinary  No CVA or suprapubic tenderness       Musculoskeletal R arm AVF+thrill. Skin No erythema,rash,depigmentation       Neurology Mental status: alert and oriented x4  Cranial nerves: CN 2-12 intact.   Motor 5/5 through out     Psychiatry          No intake or output data in the 24 hours ending 08/07/19 1525       Recent labs & imaging reviewed:    Problem List as of 8/7/2019 Date Reviewed: 7/31/2019          Codes Class Noted - Resolved    * (Principal) SBO (small bowel obstruction) ([facility].) ICD-10-CM: V49.712  ICD-9-CM: 560.9  7/31/2019 - Present        HCAP (healthcare-associated pneumonia) ICD-10-CM: J18.9  ICD-9-CM: 486  7/16/2019 - Present        Anemia due to blood loss ICD-10-CM: D50.0  ICD-9-CM: 280.0  4/21/2019 - Present        Vaginal bleeding ICD-10-CM: N93.9  ICD-9-CM: 623.8  4/18/2019 - Present        Hyperkalemia ICD-10-CM: E87.5  ICD-9-CM: 276.7  3/4/2019 - Present        ESRD on hemodialysis ([facility].) ICD-10-CM: N18.6, Z99.2  ICD-9-CM: 585.6, V45.11  8/27/2018 - Present        SOB (shortness of breath) ICD-10-CM: R06.02  ICD-9-CM: 786.05  8/15/2018 - Present        Rib pain on right side ICD-10-CM: R07.81  ICD-9-CM: 786.50  7/2/2018 - Present        Troponin level elevated ICD-10-CM: R74.8  ICD-9-CM: 790.6  6/17/2018 - Present        Intra-abdominal abscess ([facility] ICD-10-CM: K65.1  ICD-9-CM: 567.22  5/12/2018 - Present        Sepsis ([facility] ICD-10-CM: A41.9  ICD-9-CM: 038.9, 995.91  4/29/2018 - Present        Generalized abdominal pain ICD-10-CM: R10.84  ICD-9-CM: 789.07  4/4/2018 - Present        Other constipation ICD-10-CM: K59.09  ICD-9-CM: 564.09  4/4/2018 - Present        Other ascites ICD-10-CM: R18.8  ICD-9-CM: 789.59  4/4/2018 - Present        Peritonitis ([facility] ICD-10-CM: K65.9  ICD-9-CM: 567.9  3/11/2018 - Present        History of pulmonary embolism ICD-10-CM: P95.120  ICD-9-CM: V12.55  12/8/2017 - Present        Hypomagnesemia ICD-10-CM: E83.42  ICD-9-CM: 275.2  10/30/2017 - Present        Hypocalcemia ICD-10-CM: E83.51  ICD-9-CM: 275.41  10/30/2017 - Present        Hypokalemia ICD-10-CM: E87.6  ICD-9-CM: 276.8  10/27/2017 - Present        Hypertension (Chronic) ICD-10-CM: I10  ICD-9-CM: 401.9  10/14/2017 - Present        Chronic bilateral low back pain without sciatica ICD-10-CM: M54.5, G89.29  ICD-9-CM: 724.2, 338.29  5/26/2017 - Present        Anemia of renal disease ICD-10-CM: N18.9, D63.1  ICD-9-CM: 285.21  2/21/2017 - Present        Dependence on peritoneal dialysis [facility]) ICD-10-CM: Z99.2  ICD-9-CM: V45.11  2/21/2017 - Present        ACP (advance care planning) ICD-10-CM: Z71.89  ICD-9-CM: V65.49  2/21/2017 - Present        Malignant hypertension ICD-10-CM: I10  ICD-9-CM: 401.0  7/11/2016 - Present        Encounter for monitoring opioid maintenance therapy ICD-10-CM: Z51.81, Z79.891  ICD-9-CM: V58.83, V58.69  11/3/2015 - Present        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  ICD-9-CM: 710.0, 583.81  3/10/2012 - Present        Lupus ICD-10-CM: M32.9  ICD-9-CM: 710.0  2/27/2012 - Present        RESOLVED: Neutropenia ([facility] ICD-10-CM: D70.9  ICD-9-CM: 288.00  9/15/2018 - 9/19/2018        RESOLVED: Anemia in chronic kidney disease ICD-10-CM: N18.9, D63.1  ICD-9-CM: 285.21  9/15/2018 - 9/19/2018        RESOLVED: Malignant hypertensive urgency ICD-10-CM: I16.0  ICD-9-CM: 401.0  9/10/2018 - 9/19/2018        RESOLVED: Hypertensive urgency ICD-10-CM: I16.0  ICD-9-CM: 401.9  7/19/2018 - 7/22/2018        RESOLVED: Sepsis ([email or web address] ICD-10-CM: A41.9  ICD-9-CM: 038.9, 995.91  12/12/2017 - 12/22/2017        RESOLVED: Pneumonia ICD-10-CM: J18.9  ICD-9-CM: 302  10/14/2017 - 12/8/2017        RESOLVED: Pleural effusion, left ICD-10-CM: J90  ICD-9-CM: 511.9  10/14/2017 - 12/8/2017        RESOLVED: ESRD on peritoneal dialysis [facility]) (Chronic) ICD-10-CM: N18.6, Z99.2  ICD-9-CM: 585.6, V45.11  10/7/2016 - 8/27/2018        RESOLVED: Idiopathic chronic inflammatory bowel disease ICD-10-CM: K63.89  ICD-9-CM: 558.9  8/28/2013 - 8/28/2013        RESOLVED: Abdominal pain ICD-10-CM: R10.9  ICD-9-CM: 789.00  8/28/2013 - 9/3/2013        RESOLVED: Hypoxia ICD-10-CM: R09.02  ICD-9-CM: 799.02  4/25/2013 - 4/30/2013        RESOLVED: Lupus nephritis ([facility] ICD-10-CM: M32.14  ICD-9-CM: 710.0, 583.81  4/27/2012 - 4/28/2012                Yamil Melchor MD  Internal Medicine  Date of Service: 8/7/2019

## 2019-08-07 NOTE — PROGRESS NOTES
Progress Note    Patient: Tatiana Moreno MRN: [id number]  SSN: [id number]    YOB: 1985  Age: 36 y.o. Sex: female      Admit Date: 2019    5 Days Post-Op    Procedure:  Procedure(s):  ESOPHAGOGASTRODUODENOSCOPY (EGD)    Subjective:     Patient feeling good today. She tolerated the sips and was started on clears. She has been passing gas but no BM yet. Objective:     Visit Vitals  /75   Pulse 82   Temp 98.4 °F (36.9 °C)   Resp 18   Ht 5' 5\" (1.651 m)   Wt 181 lb (82.1 kg)   SpO2 93%   Breastfeeding? No   BMI 30.12 kg/m²       Temp (24hrs), Av.2 °F (36.8 °C), Min:97.8 °F (36.6 °C), Max:98.4 °F (36.9 °C)      Physical Exam:    Gen- Alert in NAD  Lungs-CTA   H-RRR  Abd- soft/nt/nd    Data Review: images and reports reviewed  AXR- nonspecific bowel pattern   Lab Review: All lab results for the last 24 hours reviewed. Recent Results (from the past 24 hour(s))   METABOLIC PANEL, COMPREHENSIVE    Collection Time: 19  2:40 AM   Result Value Ref Range    Sodium 133 (L) 136 - 145 mmol/L    Potassium 4.2 3.5 - 5.1 mmol/L    Chloride 100 97 - 108 mmol/L    CO2 19 (L) 21 - 32 mmol/L    Anion gap 14 5 - 15 mmol/L    Glucose 54 (L) 65 - 100 mg/dL    BUN 30 (H) 6 - 20 MG/DL    Creatinine 7.00 (H) 0.55 - 1.02 MG/DL    BUN/Creatinine ratio 4 (L) 12 - 20      GFR est AA 8 (L) >60 ml/min/1.73m2    GFR est non-AA 7 (L) >60 ml/min/1.73m2    Calcium 8.7 8.5 - 10.1 MG/DL    Bilirubin, total 0.3 0.2 - 1.0 MG/DL    ALT (SGPT) 12 12 - 78 U/L    AST (SGOT) 19 15 - 37 U/L    Alk.  phosphatase 76 45 - 117 U/L    Protein, total 8.0 6.4 - 8.2 g/dL    Albumin 3.0 (L) 3.5 - 5.0 g/dL    Globulin 5.0 (H) 2.0 - 4.0 g/dL    A-G Ratio 0.6 (L) 1.1 - 2.2         Assessment:     Hospital Problems  Date Reviewed: 2019          Codes Class Noted POA    * (Principal) [facility] obstruction) [facility] ICD-10-CM: J25.932  ICD-9-CM: 560.9  2019 Unknown              Plan/Recommendations/Medical Decision Making:   Doing well   If she does ok with clears today may advance her to Renal GI lite diet tomorrow.

## 2019-08-08 LAB
ANION GAP SERPL CALC-SCNC: 13 MMOL/L (ref 5–15)
BASOPHILS # BLD: 0 K/UL (ref 0–0.1)
BASOPHILS NFR BLD: 0 % (ref 0–1)
BUN SERPL-MCNC: 42 MG/DL (ref 6–20)
BUN/CREAT SERPL: 5 (ref 12–20)
CALCIUM SERPL-MCNC: 9.2 MG/DL (ref 8.5–10.1)
CHLORIDE SERPL-SCNC: 96 MMOL/L (ref 97–108)
CO2 SERPL-SCNC: 24 MMOL/L (ref 21–32)
CREAT SERPL-MCNC: 9.03 MG/DL (ref 0.55–1.02)
DIFFERENTIAL METHOD BLD: ABNORMAL
EOSINOPHIL # BLD: 0 K/UL (ref 0–0.4)
EOSINOPHIL NFR BLD: 0 % (ref 0–7)
ERYTHROCYTE [DISTWIDTH] IN BLOOD BY AUTOMATED COUNT: 14.6 % (ref 11.5–14.5)
GLUCOSE SERPL-MCNC: 135 MG/DL (ref 65–100)
HCT VFR BLD AUTO: 34.8 % (ref 35–47)
HGB BLD-MCNC: 10.7 G/DL (ref 11.5–16)
IMM GRANULOCYTES # BLD AUTO: 0 K/UL (ref 0–0.04)
IMM GRANULOCYTES NFR BLD AUTO: 0 % (ref 0–0.5)
LYMPHOCYTES # BLD: 0.6 K/UL (ref 0.8–3.5)
LYMPHOCYTES NFR BLD: 7 % (ref 12–49)
MCH RBC QN AUTO: 27.9 PG (ref 26–34)
MCHC RBC AUTO-ENTMCNC: 30.7 G/DL (ref 30–36.5)
MCV RBC AUTO: 90.9 FL (ref 80–99)
MONOCYTES # BLD: 0.9 K/UL (ref 0–1)
MONOCYTES NFR BLD: 10 % (ref 5–13)
NEUTS SEG # BLD: 7.7 K/UL (ref 1.8–8)
NEUTS SEG NFR BLD: 83 % (ref 32–75)
NRBC # BLD: 0 K/UL (ref 0–0.01)
NRBC BLD-RTO: 0 PER 100 WBC
PLATELET # BLD AUTO: 329 K/UL (ref 150–400)
PLATELET COMMENTS,PCOM: ABNORMAL
PMV BLD AUTO: 9.4 FL (ref 8.9–12.9)
POTASSIUM SERPL-SCNC: 4.1 MMOL/L (ref 3.5–5.1)
RBC # BLD AUTO: 3.83 M/UL (ref 3.8–5.2)
RBC MORPH BLD: ABNORMAL
RBC MORPH BLD: ABNORMAL
SODIUM SERPL-SCNC: 133 MMOL/L (ref 136–145)
WBC # BLD AUTO: 9.2 K/UL (ref 3.6–11)

## 2019-08-08 PROCEDURE — 90935 HEMODIALYSIS ONE EVALUATION: CPT

## 2019-08-08 PROCEDURE — 36415 COLL VENOUS BLD VENIPUNCTURE: CPT

## 2019-08-08 PROCEDURE — 74011250636 HC RX REV CODE- 250/636: Performed by: INTERNAL MEDICINE

## 2019-08-08 PROCEDURE — 77030027138 HC INCENT SPIROMETER -A

## 2019-08-08 PROCEDURE — 74011250636 HC RX REV CODE- 250/636: Performed by: NURSE PRACTITIONER

## 2019-08-08 PROCEDURE — 74011250637 HC RX REV CODE- 250/637: Performed by: HOSPITALIST

## 2019-08-08 PROCEDURE — 94640 AIRWAY INHALATION TREATMENT: CPT

## 2019-08-08 PROCEDURE — 94760 N-INVAS EAR/PLS OXIMETRY 1: CPT

## 2019-08-08 PROCEDURE — 74011250637 HC RX REV CODE- 250/637: Performed by: INTERNAL MEDICINE

## 2019-08-08 PROCEDURE — 74011000250 HC RX REV CODE- 250: Performed by: INTERNAL MEDICINE

## 2019-08-08 PROCEDURE — 65270000029 HC RM PRIVATE

## 2019-08-08 PROCEDURE — 85025 COMPLETE CBC W/AUTO DIFF WBC: CPT

## 2019-08-08 PROCEDURE — 80048 BASIC METABOLIC PNL TOTAL CA: CPT

## 2019-08-08 PROCEDURE — C9113 INJ PANTOPRAZOLE SODIUM, VIA: HCPCS | Performed by: INTERNAL MEDICINE

## 2019-08-08 RX ADMIN — FERRIC CITRATE 420 MG: 210 TABLET, COATED ORAL at 17:23

## 2019-08-08 RX ADMIN — MORPHINE SULFATE 2 MG: 2 INJECTION, SOLUTION INTRAMUSCULAR; INTRAVENOUS at 06:23

## 2019-08-08 RX ADMIN — CARVEDILOL 25 MG: 12.5 TABLET, FILM COATED ORAL at 17:23

## 2019-08-08 RX ADMIN — LACTULOSE 30 ML: 20 SOLUTION ORAL at 17:23

## 2019-08-08 RX ADMIN — ONDANSETRON 4 MG: 2 INJECTION INTRAMUSCULAR; INTRAVENOUS at 06:18

## 2019-08-08 RX ADMIN — SODIUM CHLORIDE 40 MG: 9 INJECTION INTRAMUSCULAR; INTRAVENOUS; SUBCUTANEOUS at 05:47

## 2019-08-08 RX ADMIN — ONDANSETRON 4 MG: 2 INJECTION INTRAMUSCULAR; INTRAVENOUS at 02:21

## 2019-08-08 RX ADMIN — DICYCLOMINE HYDROCHLORIDE 20 MG: 20 TABLET ORAL at 19:36

## 2019-08-08 RX ADMIN — MORPHINE SULFATE 1 MG: 2 INJECTION, SOLUTION INTRAMUSCULAR; INTRAVENOUS at 22:21

## 2019-08-08 RX ADMIN — MORPHINE SULFATE 2 MG: 2 INJECTION, SOLUTION INTRAMUSCULAR; INTRAVENOUS at 17:23

## 2019-08-08 RX ADMIN — IPRATROPIUM BROMIDE AND ALBUTEROL SULFATE 3 ML: .5; 3 SOLUTION RESPIRATORY (INHALATION) at 22:12

## 2019-08-08 RX ADMIN — SODIUM CHLORIDE 10 ML: 9 INJECTION INTRAMUSCULAR; INTRAVENOUS; SUBCUTANEOUS at 05:48

## 2019-08-08 RX ADMIN — SODIUM CHLORIDE 40 MG: 9 INJECTION INTRAMUSCULAR; INTRAVENOUS; SUBCUTANEOUS at 22:26

## 2019-08-08 RX ADMIN — AMITRIPTYLINE HYDROCHLORIDE 75 MG: 50 TABLET, FILM COATED ORAL at 22:07

## 2019-08-08 RX ADMIN — SODIUM CHLORIDE 10 ML: 9 INJECTION INTRAMUSCULAR; INTRAVENOUS; SUBCUTANEOUS at 22:25

## 2019-08-08 RX ADMIN — DICYCLOMINE HYDROCHLORIDE 20 MG: 20 TABLET ORAL at 02:41

## 2019-08-08 RX ADMIN — MORPHINE SULFATE 2 MG: 2 INJECTION, SOLUTION INTRAMUSCULAR; INTRAVENOUS at 02:23

## 2019-08-08 NOTE — PROGRESS NOTES
RENAL  PROGRESS NOTE        Subjective:   resting  Objective:   VITALS SIGNS:    Visit Vitals  BP (!) 140/97 (BP 1 Location: Right arm, BP Patient Position: At rest)   Pulse 93   Temp 97.9 °F (36.6 °C)   Resp 18   Ht 5' 5\" (1.651 m)   Wt 81.8 kg (180 lb 6.4 oz)   SpO2 95%   Breastfeeding? No   BMI 30.02 kg/m²       O2 Device: Room air       Temp (24hrs), Av.7 °F (36.5 °C), Min:96.7 °F (35.9 °C), Max:98.4 °F (36.9 °C)         PHYSICAL EXAM:    resting  DATA REVIEW:     INTAKE / OUTPUT:   Last shift:      No intake/output data recorded. Last 3 shifts:  1901 -  0700  In: 550 [P.O.:550]  Out: -     Intake/Output Summary (Last 24 hours) at 2019 1122  Last data filed at 2019 2000  Gross per 24 hour   Intake 550 ml   Output --   Net 550 ml         LABS:   Recent Labs     19  0240 19  0745   WBC 9.2 5.7   HGB 10.7* 9.0*   HCT 34.8* 29.5*    294     Recent Labs     19  0240 19  0745   * 133* 135*   K 4.1 4.2 4.6   CL 96* 100 99   CO2 24 19* 24   * 54* 67   BUN 42* 30* 63*   CREA 9.03* 7.00* 11.00*   CA 9.2 8.7 8.5   PHOS  --   --  8.1*   ALB  --  3.0*  --    TBILI  --  0.3  --    SGOT  --  19  --    ALT  --  12  --            Assessment:     ESRD: TTS [facility]  HTN: Uncontrolled-> often related to interdialytic fluid gains  Partial SBO: 2 to constipation /maybe adhesions.  Clinically improving  Anemia    Hematemesis  SLE      Plan:   HD  today  Will follow  Ke Kaplan MD

## 2019-08-08 NOTE — PROGRESS NOTES
Problem: Falls - Risk of  Goal: *Absence of Falls  Description  Document Lyric Martínez Fall Risk and appropriate interventions in the flowsheet.   Outcome: Progressing Towards Goal  Note:   Fall Risk Interventions:            Medication Interventions: Patient to call before getting OOB, Teach patient to arise slowly    Elimination Interventions: Call light in reach              Problem: Patient Education: Go to Patient Education Activity  Goal: Patient/Family Education  Outcome: Progressing Towards Goal     Problem: Small Bowel Obstruction: Day 1  Goal: Off Pathway (Use only if patient is Off Pathway)  Outcome: Progressing Towards Goal  Goal: Activity/Safety  Outcome: Progressing Towards Goal     Problem: Hypertension  Goal: *Blood pressure within specified parameters  Outcome: Progressing Towards Goal  Goal: *Fluid volume balance  Outcome: Progressing Towards Goal  Goal: *Labs within defined limits  Outcome: Progressing Towards Goal

## 2019-08-08 NOTE — PROCEDURES
[facility] Dialysis Team [facility]  [phone]    Vitals   Pre   Post   Assessment   Pre   Post     Temp  Temp: 98.8 °F (37.1 °C) (08/08/19 1230)  98.5 LOC  Alert and oriented x 4 same   HR   Pulse (Heart Rate): 93 (08/08/19 1230) 109 Lungs   Wheezes upper lobes,   same   B/P   BP: 133/85 (08/08/19 1230) 112/76 Cardiac   tachy  same   Resp   Resp Rate: 18 (08/08/19 1230) 18 Skin   Dry and intact  same   Pain level  Pain Intensity 1: 6 (08/08/19 0225) 0 Edema    None present   same   Orders:    Duration:   Start:    1230 End:    1600 Total:   3.5   Dialyzer:   Dialyzer/Set Up Inspection: Revaclear (08/08/19 1230)   K Bath:   Dialysate K (mEq/L): 2 (08/08/19 1230)   Ca Bath:   Dialysate CA (mEq/L): 2.5 (08/08/19 1230)   Na/Bicarb:   Dialysate NA (mEq/L): 140 (08/08/19 1230)   Target Fluid Removal:   Goal/Amount of Fluid to Remove (mL): 2000 mL (08/08/19 1230)   Access     Type & Location:   Right upper arm AVG with no s/s of infection, no redness, no swelling present.    Labs     Obtained/Reviewed   Critical Results Called   Date when labs were drawn-  Hgb-    HGB   Date Value Ref Range Status   08/08/2019 10.7 (L) 11.5 - 16.0 g/dL Final     K-    Potassium   Date Value Ref Range Status   08/08/2019 4.1 3.5 - 5.1 mmol/L Final     Ca-   Calcium   Date Value Ref Range Status   08/08/2019 9.2 8.5 - 10.1 MG/DL Final     Bun-   BUN   Date Value Ref Range Status   08/08/2019 42 (H) 6 - 20 MG/DL Final     Creat-   Creatinine   Date Value Ref Range Status   08/08/2019 9.03 (H) 0.55 - 1.02 MG/DL Final        Medications/ Blood Products Given     Name   Dose   Route and Time           none          Blood Volume Processed (BVP):    78.2 Net Fluid   Removed:  2000 ml   Comments   Time Out Done: yes  Primary Nurse Rpt Pre: Cordelia Kraus RN  Primary Nurse Rpt Post: Cordelia Kraus Rn  Pt Education: Procedure  Care Plan: Continue current plan of care  Tx Summary:1230- HD started using right upper arm AVG, AVG was + for bruit and thrill and cannulated with x2 needles without difficulty. AVG was flushed and aspirated with patent blood flow. 1600- HD completed and all blood was returned. Pt tolerated treatment well. Prime given and needles were removed with no excessive bleeding. Post right upper arm AVG bruit and thrill with benavides guaze in place. Admiting Diagnosis: Small bowel Obstruction  Pt's previous clinic- [facility]  Consent signed - Informed Consent Verified: Yes (08/08/19 1230)  Reginald Consent - Yes  Hepatitis Status-  Hep B Ag- Neg, Hep B Ab- susceptible 7/31/2019  Machine #- Machine Number: [id number] (08/08/19 1230)  Telemetry status- n/a  Pre-dialysis wt. -  n/a

## 2019-08-08 NOTE — PROGRESS NOTES
Spiritual Care Assessment/Progress Note  [facility]      NAME: Kemar Bartlett      MRN: [id number]  AGE: 36 y.o. SEX: female  Catholic Affiliation: Anabaptism   Language: English     8/8/2019     Total Time (in minutes): 5     Spiritual Assessment begun in [facility] 5 through conversation with:         []Patient        [] Family    [] Friend(s)        Reason for Consult: Initial/Spiritual assessment, patient floor     Spiritual beliefs: (Please include comment if needed)     [] Identifies with a prince tradition:         [] Supported by a prince community:            [] Claims no spiritual orientation:           [] Seeking spiritual identity:                [] Adheres to an individual form of spirituality:           [x] Not able to assess:                           Identified resources for coping:      [] Prayer                               [] Music                  [] Guided Imagery     [] Family/friends                 [] Pet visits     [] Devotional reading                         [x] Unknown     [] Other:                                              Interventions offered during this visit: (See comments for more details)    Patient Interventions: Spiritual care volunteer support           Plan of Care:     [] Support spiritual and/or cultural needs    [] Support AMD and/or advance care planning process      [] Support grieving process   [] Coordinate Rites and/or Rituals    [] Coordination with community clergy   [] No spiritual needs identified at this time   [] Detailed Plan of Care below (See Comments)  [] Make referral to Music Therapy  [] Make referral to Pet Therapy     [] Make referral to Addiction services  [] Make referral to [facility]  [] Make referral to Spiritual Care Partner  [] No future visits requested        [x] Follow up visits as needed     Comments:  visit for initial spiritual assessment. Patient resting quietly in bed.   Did not awaken or respond to knock at door or verbal stimuli. Will continue to follow up as needed and upon request as able. Visited by Rev. Seth Brizuela, [place], ThM, [facility] paging service: [phone])

## 2019-08-08 NOTE — PROGRESS NOTES
8/8/19 -   SURJIT:  - HD today, 8/8  - Continues with nausea and vomiting  - Continues on clear liquid diet  - STILL NEEDS: diet advancement, BM  CRM: Timmy Lewis, MPH, CHES; Z: [phone]

## 2019-08-08 NOTE — PROGRESS NOTES
Progress Note    Patient: Anatoliy Cates MRN: [id number]  SSN: [id number]    YOB: 1985  Age: 36 y.o. Sex: female      Admit Date: 2019    6 Days Post-Op    Procedure:  Procedure(s):  ESOPHAGOGASTRODUODENOSCOPY (EGD)    Subjective:     Patient felt ok yesterday and had a normal xray but then vomited again after being placed on clears. Objective:     Visit Vitals  /90   Pulse 94   Temp 98.8 °F (37.1 °C)   Resp 18   Ht 5' 5\" (1.651 m)   Wt 180 lb 6.4 oz (81.8 kg)   SpO2 95%   Breastfeeding? No   BMI 30.02 kg/m²       Temp (24hrs), Av.8 °F (36.6 °C), Min:96.7 °F (35.9 °C), Max:98.8 °F (37.1 °C)      Physical Exam:    Gen- alert in NAD  Lungs-CTA  H-RRR   S/nt/nd    Data Review: images and reports reviewed    Lab Review: All lab results for the last 24 hours reviewed.   Recent Results (from the past 24 hour(s))   METABOLIC PANEL, BASIC    Collection Time: 19  2:40 AM   Result Value Ref Range    Sodium 133 (L) 136 - 145 mmol/L    Potassium 4.1 3.5 - 5.1 mmol/L    Chloride 96 (L) 97 - 108 mmol/L    CO2 24 21 - 32 mmol/L    Anion gap 13 5 - 15 mmol/L    Glucose 135 (H) 65 - 100 mg/dL    BUN 42 (H) 6 - 20 MG/DL    Creatinine 9.03 (H) 0.55 - 1.02 MG/DL    BUN/Creatinine ratio 5 (L) 12 - 20      GFR est AA 6 (L) >60 ml/min/1.73m2    GFR est non-AA 5 (L) >60 ml/min/1.73m2    Calcium 9.2 8.5 - 10.1 MG/DL   CBC WITH AUTOMATED DIFF    Collection Time: 19  2:40 AM   Result Value Ref Range    WBC 9.2 3.6 - 11.0 K/uL    RBC 3.83 3.80 - 5.20 M/uL    HGB 10.7 (L) 11.5 - 16.0 g/dL    HCT 34.8 (L) 35.0 - 47.0 %    MCV 90.9 80.0 - 99.0 FL    MCH 27.9 26.0 - 34.0 PG    MCHC 30.7 30.0 - 36.5 g/dL    RDW 14.6 (H) 11.5 - 14.5 %    PLATELET 895 378 - 825 K/uL    MPV 9.4 8.9 - 12.9 FL    NRBC 0.0 0  WBC    ABSOLUTE NRBC 0.00 0.00 - 0.01 K/uL    NEUTROPHILS 83 (H) 32 - 75 %    LYMPHOCYTES 7 (L) 12 - 49 %    MONOCYTES 10 5 - 13 %    EOSINOPHILS 0 0 - 7 %    BASOPHILS 0 0 - 1 % IMMATURE GRANULOCYTES 0 0.0 - 0.5 %    ABS. NEUTROPHILS 7.7 1.8 - 8.0 K/UL    ABS. LYMPHOCYTES 0.6 (L) 0.8 - 3.5 K/UL    ABS. MONOCYTES 0.9 0.0 - 1.0 K/UL    ABS. EOSINOPHILS 0.0 0.0 - 0.4 K/UL    ABS. BASOPHILS 0.0 0.0 - 0.1 K/UL    ABS. IMM. GRANS. 0.0 0.00 - 0.04 K/UL    DF SMEAR SCANNED      PLATELET COMMENTS Large Platelets      RBC COMMENTS POLYCHROMASIA  1+        RBC COMMENTS ANISOCYTOSIS  1+           Assessment:     Hospital Problems  Date Reviewed: 7/31/2019          Codes Class Noted POA    * (Principal) [facility] ICD-10-CM: X17.718  ICD-9-CM: 560.9  7/31/2019 Unknown              Plan/Recommendations/Medical Decision Making:   Patient currently on dialysis. She continue to have some vomiting despite the AXR looking better and flatus  Will order Small bowel series for tomorrow to evaluate if she is actually obstructed.

## 2019-08-08 NOTE — PROGRESS NOTES
Hospitalist Progress Note  Kate Boyd MD  Answering service: [phone] OR [phone] from in house phone      Date of Service:  2019  NAME:  Anatoliy Cates                                                         :  1985                                               MRN:  [id number]    Brief admission summary   52-year-old female with past medical history significant for anemia; carditis; hypercholesteremia; lupus; chronic pain syndrome; ESRD, on dialysis; thromboembolism; and hypertension, was brought  from home via EMS with complaints of abdominal pain and vomiting. Abdominal pain started four days prior to admission. Patient also complained of associated constipation. CC: Abdominal pain. Patient C/O intermittent nausea and vomiting on am rounds. Assessment and plan   Resolving probable Partial SBO due to probable constipation and adhesions.  -Slow clinical improvement.  -Tolerating clears.  -Surgical F/U noted and appreciated. Hematemesis  -Hb stable  -EGD fairly unremarkable  -PPI bid  -GI eval noted and appreciated. Accelerated hypertension. Uncontrolled Hypertension. -BP improved with dialysis. Continue amlodipine,carvedilo,losartan and minoxidil     SLE. No acute flare. continue azathioprine,hydroxychloroquine and prednisone. ESRD on HD  -access: right arm AVF  -Schedule TTS  -Nephrology F/U noted and appreciated. Anemia of chronicity.  -Hb stable. -Prophylaxis: GI and DVT. -Directives: Full Code. -Plan: Possible D/C in 1-2 days if able to tolerate GI lite diet. D/W patient and Nursing. Review of Systems:  A comprehensive review of systems was negative except for that written in the HPI. PHYSICAL EXAM:  O:  Visit Vitals  BP (!) 140/97 (BP 1 Location: Right arm, BP Patient Position: At rest)   Pulse 93   Temp 97.9 °F (36.6 °C)   Resp 18   Ht 5' 5\" (1.651 m)   Wt 81.8 kg (180 lb 6.4 oz)   SpO2 95%   Breastfeeding?  No BMI 30.02 kg/m²       General Appears comfortable,not in distress. HEENT Head atraumatic. Unicteric sclera. Moist buccal mucosa. PERRLA     CVS RRR, no MRG, no JVD, no peripheral edema       Chest No deformity  No accessory muscle use  Vesicular air entry symmetrically         Abdomen &Pelvis Non distended. Mild generalized tenderness. Hypoactive bowel sounds. small midline surgical scar. Genitourinary  No CVA or suprapubic tenderness       Musculoskeletal R arm AVF+thrill. Skin No erythema,rash,depigmentation       Neurology Mental status: alert and oriented x4  Cranial nerves: CN 2-12 intact.   Motor 5/5 through out     Psychiatry            Intake/Output Summary (Last 24 hours) at 8/8/2019 1222  Last data filed at 8/7/2019 2000  Gross per 24 hour   Intake 550 ml   Output --   Net 550 ml          Recent labs & imaging reviewed:    Problem List as of 8/8/2019 Date Reviewed: 7/31/2019          Codes Class Noted - Resolved    * (Principal) SBO (small bowel obstruction) ([facility] ICD-10-CM: S39.234  ICD-9-CM: 560.9  7/31/2019 - Present        HCAP (healthcare-associated pneumonia) ICD-10-CM: J18.9  ICD-9-CM: 486  7/16/2019 - Present        Anemia due to blood loss ICD-10-CM: D50.0  ICD-9-CM: 280.0  4/21/2019 - Present        Vaginal bleeding ICD-10-CM: N93.9  ICD-9-CM: 623.8  4/18/2019 - Present        Hyperkalemia ICD-10-CM: E87.5  ICD-9-CM: 276.7  3/4/2019 - Present        ESRD on hemodialysis ([facility] ICD-10-CM: N18.6, Z99.2  ICD-9-CM: 585.6, V45.11  8/27/2018 - Present        SOB (shortness of breath) ICD-10-CM: R06.02  ICD-9-CM: 786.05  8/15/2018 - Present        Rib pain on right side ICD-10-CM: R07.81  ICD-9-CM: 786.50  7/2/2018 - Present        Troponin level elevated ICD-10-CM: R74.8  ICD-9-CM: 790.6  6/17/2018 - Present        Intra-abdominal abscess ([facility] ICD-10-CM: K65.1  ICD-9-CM: 567.22  5/12/2018 - Present        Sepsis ([facility].) ICD-10-CM: A41.9  ICD-9-CM: 038.9, 995.91  4/29/2018 - Present Generalized abdominal pain ICD-10-CM: R10.84  ICD-9-CM: 789.07  4/4/2018 - Present        Other constipation ICD-10-CM: K59.09  ICD-9-CM: 564.09  4/4/2018 - Present        Other ascites ICD-10-CM: R18.8  ICD-9-CM: 789.59  4/4/2018 - Present        Peritonitis ([facility] ICD-10-CM: K65.9  ICD-9-CM: 567.9  3/11/2018 - Present        History of pulmonary embolism ICD-10-CM: Q44.023  ICD-9-CM: V12.55  12/8/2017 - Present        Hypomagnesemia ICD-10-CM: E83.42  ICD-9-CM: 275.2  10/30/2017 - Present        Hypocalcemia ICD-10-CM: E83.51  ICD-9-CM: 275.41  10/30/2017 - Present        Hypokalemia ICD-10-CM: E87.6  ICD-9-CM: 276.8  10/27/2017 - Present        Hypertension (Chronic) ICD-10-CM: I10  ICD-9-CM: 401.9  10/14/2017 - Present        Chronic bilateral low back pain without sciatica ICD-10-CM: M54.5, G89.29  ICD-9-CM: 724.2, 338.29  5/26/2017 - Present        Anemia of renal disease ICD-10-CM: N18.9, D63.1  ICD-9-CM: 285.21  2/21/2017 - Present        Dependence on peritoneal dialysis 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  ICD-9-CM: 710.0, 583.81  3/10/2012 - Present        Lupus ICD-10-CM: M32.9  ICD-9-CM: 710.0  2/27/2012 - Present        RESOLVED: Neutropenia ([address] ICD-10-CM: D70.9  ICD-9-CM: 288.00  9/15/2018 - 9/19/2018        RESOLVED: Anemia in chronic kidney disease ICD-10-CM: N18.9, D63.1  ICD-9-CM: 285.21  9/15/2018 - 9/19/2018        RESOLVED: Malignant hypertensive urgency ICD-10-CM: I16.0  ICD-9-CM: 401.0  9/10/2018 - 9/19/2018        RESOLVED: Hypertensive urgency ICD-10-CM: I16.0  ICD-9-CM: 401.9  7/19/2018 - 7/22/2018 RESOLVED: Sepsis ([facility] ICD-10-CM: A41.9  ICD-9-CM: 038.9, 995.91  12/12/2017 - 12/22/2017        RESOLVED: Pneumonia ICD-10-CM: J18.9  ICD-9-CM: 178  10/14/2017 - 12/8/2017        RESOLVED: Pleural effusion, left ICD-10-CM: J90  ICD-9-CM: 511.9  10/14/2017 - 12/8/2017        RESOLVED: ESRD on peritoneal dialysis ([facility] (Chronic) ICD-10-CM: N18.6, Z99.2  ICD-9-CM: 585.6, V45.11  10/7/2016 - 8/27/2018        RESOLVED: Idiopathic chronic inflammatory bowel disease ICD-10-CM: K63.89  ICD-9-CM: 558.9  8/28/2013 - 8/28/2013        RESOLVED: Abdominal pain ICD-10-CM: R10.9  ICD-9-CM: 789.00  8/28/2013 - 9/3/2013        RESOLVED: Hypoxia ICD-10-CM: R09.02  ICD-9-CM: 799.02  4/25/2013 - 4/30/2013        RESOLVED: Lupus nephritis ([facility] ICD-10-CM: M32.14  ICD-9-CM: 710.0, 583.81  4/27/2012 - 4/28/2012                Lisa Felder MD  Internal Medicine  Date of Service: 8/8/2019

## 2019-08-09 ENCOUNTER — APPOINTMENT (OUTPATIENT)
Dept: GENERAL RADIOLOGY | Age: 34
DRG: 388 | End: 2019-08-09
Attending: SURGERY
Payer: MEDICARE

## 2019-08-09 LAB
ANION GAP SERPL CALC-SCNC: 11 MMOL/L (ref 5–15)
BUN SERPL-MCNC: 37 MG/DL (ref 6–20)
BUN/CREAT SERPL: 5 (ref 12–20)
CALCIUM SERPL-MCNC: 9.6 MG/DL (ref 8.5–10.1)
CHLORIDE SERPL-SCNC: 97 MMOL/L (ref 97–108)
CO2 SERPL-SCNC: 27 MMOL/L (ref 21–32)
CREAT SERPL-MCNC: 7.82 MG/DL (ref 0.55–1.02)
GLUCOSE SERPL-MCNC: 96 MG/DL (ref 65–100)
POTASSIUM SERPL-SCNC: 4.2 MMOL/L (ref 3.5–5.1)
SODIUM SERPL-SCNC: 135 MMOL/L (ref 136–145)

## 2019-08-09 PROCEDURE — 74011636637 HC RX REV CODE- 636/637: Performed by: INTERNAL MEDICINE

## 2019-08-09 PROCEDURE — 74011250637 HC RX REV CODE- 250/637: Performed by: INTERNAL MEDICINE

## 2019-08-09 PROCEDURE — C9113 INJ PANTOPRAZOLE SODIUM, VIA: HCPCS | Performed by: INTERNAL MEDICINE

## 2019-08-09 PROCEDURE — 80048 BASIC METABOLIC PNL TOTAL CA: CPT

## 2019-08-09 PROCEDURE — 74018 RADEX ABDOMEN 1 VIEW: CPT

## 2019-08-09 PROCEDURE — 65270000029 HC RM PRIVATE

## 2019-08-09 PROCEDURE — 36415 COLL VENOUS BLD VENIPUNCTURE: CPT

## 2019-08-09 PROCEDURE — 74011250636 HC RX REV CODE- 250/636: Performed by: INTERNAL MEDICINE

## 2019-08-09 PROCEDURE — 74011250636 HC RX REV CODE- 250/636: Performed by: NURSE PRACTITIONER

## 2019-08-09 PROCEDURE — 74011000250 HC RX REV CODE- 250: Performed by: INTERNAL MEDICINE

## 2019-08-09 PROCEDURE — 74011250637 HC RX REV CODE- 250/637: Performed by: HOSPITALIST

## 2019-08-09 RX ADMIN — SODIUM CHLORIDE 40 MG: 9 INJECTION INTRAMUSCULAR; INTRAVENOUS; SUBCUTANEOUS at 21:27

## 2019-08-09 RX ADMIN — SENNOSIDES, DOCUSATE SODIUM 2 TABLET: 50; 8.6 TABLET, FILM COATED ORAL at 10:12

## 2019-08-09 RX ADMIN — MORPHINE SULFATE 2 MG: 2 INJECTION, SOLUTION INTRAMUSCULAR; INTRAVENOUS at 10:26

## 2019-08-09 RX ADMIN — DICYCLOMINE HYDROCHLORIDE 20 MG: 20 TABLET ORAL at 21:32

## 2019-08-09 RX ADMIN — SODIUM CHLORIDE 10 ML: 9 INJECTION INTRAMUSCULAR; INTRAVENOUS; SUBCUTANEOUS at 07:56

## 2019-08-09 RX ADMIN — LACTULOSE 30 ML: 20 SOLUTION ORAL at 10:12

## 2019-08-09 RX ADMIN — AMITRIPTYLINE HYDROCHLORIDE 75 MG: 50 TABLET, FILM COATED ORAL at 21:26

## 2019-08-09 RX ADMIN — LACTULOSE 30 ML: 20 SOLUTION ORAL at 18:39

## 2019-08-09 RX ADMIN — DICYCLOMINE HYDROCHLORIDE 20 MG: 20 TABLET ORAL at 03:42

## 2019-08-09 RX ADMIN — SODIUM CHLORIDE 10 ML: 9 INJECTION INTRAMUSCULAR; INTRAVENOUS; SUBCUTANEOUS at 14:13

## 2019-08-09 RX ADMIN — FERRIC CITRATE 420 MG: 210 TABLET, COATED ORAL at 07:53

## 2019-08-09 RX ADMIN — FERRIC CITRATE 420 MG: 210 TABLET, COATED ORAL at 18:39

## 2019-08-09 RX ADMIN — HYDROXYCHLOROQUINE SULFATE 400 MG: 200 TABLET, FILM COATED ORAL at 07:51

## 2019-08-09 RX ADMIN — DICYCLOMINE HYDROCHLORIDE 20 MG: 20 TABLET ORAL at 18:39

## 2019-08-09 RX ADMIN — SODIUM CHLORIDE 40 MG: 9 INJECTION INTRAMUSCULAR; INTRAVENOUS; SUBCUTANEOUS at 10:12

## 2019-08-09 RX ADMIN — MORPHINE SULFATE 1 MG: 2 INJECTION, SOLUTION INTRAMUSCULAR; INTRAVENOUS at 04:25

## 2019-08-09 RX ADMIN — MORPHINE SULFATE 2 MG: 2 INJECTION, SOLUTION INTRAMUSCULAR; INTRAVENOUS at 14:25

## 2019-08-09 RX ADMIN — SODIUM CHLORIDE 10 ML: 9 INJECTION INTRAMUSCULAR; INTRAVENOUS; SUBCUTANEOUS at 21:27

## 2019-08-09 RX ADMIN — MORPHINE SULFATE 2 MG: 2 INJECTION, SOLUTION INTRAMUSCULAR; INTRAVENOUS at 23:24

## 2019-08-09 RX ADMIN — Medication 10 ML: at 10:16

## 2019-08-09 RX ADMIN — PREDNISONE 10 MG: 5 TABLET ORAL at 10:12

## 2019-08-09 NOTE — ROUTINE PROCESS
Bedside and Verbal shift change report given to Du (oncoming nurse) by Ro Kat. Haley Weaver RN (offgoing nurse). Report included the following information SBAR, Kardex, Procedure Summary, Intake/Output, MAR, Accordion, Recent Results and Med Rec Status.

## 2019-08-09 NOTE — PROGRESS NOTES
8/9/19 -   SURJIT:  - Couldn't tolerate contrast today, 8/9, for SBFT  - Eval for potential TPN plan  - Vomited 8/8  - HD on 8/10  CRM: Inés De La Cruz, MPH, CHES; Z: [phone]

## 2019-08-09 NOTE — PROGRESS NOTES
Bedside shift change report given to [employer], RN (oncoming nurse) by Ava Berg RN and Noel Rosario RN  (offgoing nurse). Report included the following information SBAR and Intake/Output.

## 2019-08-09 NOTE — PROGRESS NOTES
RENAL  PROGRESS NOTE        Subjective:   Get abd imaging as per RN  Objective:   VITALS SIGNS:    Visit Vitals  BP 93/68 (BP 1 Location: Left arm, BP Patient Position: At rest)   Pulse 96   Temp 98.1 °F (36.7 °C)   Resp 20   Ht 5' 5\" (1.651 m)   Wt 64.6 kg (142 lb 8 oz)   SpO2 96%   Breastfeeding? No   BMI 23.71 kg/m²       O2 Device: Room air       Temp (24hrs), Av.2 °F (36.8 °C), Min:97.2 °F (36.2 °C), Max:98.8 °F (37.1 °C)         PHYSICAL EXAM:       DATA REVIEW:     INTAKE / OUTPUT:   Last shift:      No intake/output data recorded. Last 3 shifts:  1901 -  0700  In: 370 [P.O.:370]  Out: 2500     Intake/Output Summary (Last 24 hours) at 2019 0940  Last data filed at 2019 1809  Gross per 24 hour   Intake 120 ml   Output 2500 ml   Net -2380 ml         LABS:   Recent Labs     19  0240   WBC 9.2   HGB 10.7*   HCT 34.8*        Recent Labs     19  0358 19  0240 19  0240   * 133* 133*   K 4.2 4.1 4.2   CL 97 96* 100   CO2 27 24 19*   GLU 96 135* 54*   BUN 37* 42* 30*   CREA 7.82* 9.03* 7.00*   CA 9.6 9.2 8.7   ALB  --   --  3.0*   TBILI  --   --  0.3   SGOT  --   --  19   ALT  --   --  12           Assessment:     ESRD: TTS [facility]  HTN: Uncontrolled-> often related to interdialytic fluid gains  Partial SBO: 2 to constipation /maybe adhesions.  Clinically improving  Anemia    Hematemesis  SLE      Plan:   HD  tomorrow  Will follow  Annette Marshall MD

## 2019-08-09 NOTE — PROGRESS NOTES
· Surgery Progress Note    8/9/2019    Admit Date: 7/31/2019    Subjective:     Pt was taken for a barium swallow test this morning to determine ileus vs SBO but was unable to drink the contrast, saying that the contrast was too thick for her to tolerate. Tried several sips but was unable to swallow. Denies the flavor or smell being a concern and feels it is due to the thickness that prevents her from swallowing. Pt c/o of dull pain in her chest that started on Monday and is non radiating but constant. She also reports constant dull pain in the lower back for the last 3 years. She has taken Oxy for 3 years for her back pain. She has been on Morphine at [facility] and reports that it is controlling her back  pain. She denies any true abdominal pain. She describes dull, non radiating pain in the mid epigastric region upon palpation and diffuse \"soreness\" of her lower abdomen on palpation. .  No SOB. No CP. Pt is able to ambulate with the assistance of her 15 y/o daughter. Feels too weak/unstable on her own after being in the hospital for a week/+. Patient is currently unable to tolerate any solid foods but she has eaten crackers and they stayed down . She attempted to eat some of her daughter's rice yesterday and within 5 minutes she vomited it back up. She has not eaten anything today but is tolerating sips of her ginger ale this morning without issue . diet is Clear Liquid. . Pt reports intemittent  nausea and no vomiting. Pt reports no fever or chills    Bowel Movements: None and passing alot of flatus         Objective:     Blood pressure 93/68, pulse 96, temperature 98.1 °F (36.7 °C), resp. rate 20, height 5' 5\" (1.651 m), weight 142 lb 8 oz (64.6 kg), SpO2 96 %, not currently breastfeeding. No intake/output data recorded.     08/07 1901 - 08/09 0700  In: 370 [P.O.:370]  Out: 2500     General appearance: alert, cooperative, no distress, oob in the chair drinking ginger ale, appears comfortable, voice is hoarse   Lungs: clear to auscultation bilaterally  Heart: regular rate and rhythm  Abd:soft, nondistended, normal bowel sounds, nontender, without guarding, without rebound  Extremities: no edema  Neurologic: Grossly normal    Data Review    Recent Results (from the past 12 hour(s))   METABOLIC PANEL, BASIC    Collection Time: 08/09/19  3:58 AM   Result Value Ref Range    Sodium 135 (L) 136 - 145 mmol/L    Potassium 4.2 3.5 - 5.1 mmol/L    Chloride 97 97 - 108 mmol/L    CO2 27 21 - 32 mmol/L    Anion gap 11 5 - 15 mmol/L    Glucose 96 65 - 100 mg/dL    BUN 37 (H) 6 - 20 MG/DL    Creatinine 7.82 (H) 0.55 - 1.02 MG/DL    BUN/Creatinine ratio 5 (L) 12 - 20      GFR est AA 7 (L) >60 ml/min/1.73m2    GFR est non-AA 6 (L) >60 ml/min/1.73m2    Calcium 9.6 8.5 - 10.1 MG/DL       Assessment:     Poor po tolerance with abdominal pressure  +/- ileus vs. psbo  CRF on HD   Lupus on steroids   ? Dysphagia? Plan:   Diet pt is on clears and tolerating ginger ale, she can eat crackers, passing flatus, no abdominal bloating or pain at present  Pain management--she is taking IV morphine for her chronic back pain while here  GI Prophylaxis  OOB/ ambulate in [place]   Pt does not appear to be obstructed, could not complete SBFT this AM because she says she could not swallow the contrast but mainly reports more of a texture issue, feels it is difficult to swallow and her voice is hoarse. diffuse dilated  Bowel on her films  but her abdomen is benign and there is no acute surgical indication at present. She has not had significant nutrition since admission and we may need to consider starting some TPN until we can figure out why she is not tolerating solid foods, during my visit she drinks ginger ale without issue and seems very comfortable   Further plan per DR. Justine Cummings PA-C

## 2019-08-09 NOTE — PROGRESS NOTES
Hospitalist Progress Note  Vu Briones MD  Answering service: [phone] from in house phone      Date of Service:  2019  NAME:  Neida Bishop                                                         :  1985                                               MRN:  [id number]    Brief admission summary   30-year-old female with past medical history significant for anemia; carditis; hypercholesteremia; lupus; chronic pain syndrome; ESRD, on dialysis; thromboembolism; and hypertension, was brought  from home via EMS with complaints of abdominal pain and vomiting. Abdominal pain started four days prior to admission. Patient also complained of associated constipation. CC: Abdominal pain. Patient C/O intermittent nausea and vomiting on am rounds. Assessment and plan   Resolving probable Partial SBO due to probable constipation and adhesions.  -Slow clinical improvement. Some intermittent nausea and vomiting.  -Tolerating clears. -Was unable to tolerate contrast study for Small bowel series 19.  -Surgical F/U noted and appreciated. Hematemesis  -Hb stable  -EGD fairly unremarkable  -PPI bid  -GI eval noted and appreciated. Accelerated hypertension. Uncontrolled Hypertension. -BP improved with dialysis. Continue amlodipine,carvedilo,losartan and minoxidil     SLE. No acute flare. continue azathioprine,hydroxychloroquine and prednisone. ESRD on HD  -access: right arm AVF  -Schedule TTS  -Nephrology F/U noted and appreciated. Anemia of chronicity.  -Hb stable. -Prophylaxis: GI and DVT. -Directives: Full Code. -Plan: For D/C when able to tolerate a GI lite diet. D/W patient and Nursing. Review of Systems:  A comprehensive review of systems was negative except for that written in the HPI.         PHYSICAL EXAM:  O:  Visit Vitals  BP 93/68 (BP 1 Location: Left arm, BP Patient Position: At rest)   Pulse 96   Temp 98.1 °F (36.7 °C) Resp 20   Ht 5' 5\" (1.651 m)   Wt 64.6 kg (142 lb 8 oz)   SpO2 96%   Breastfeeding? No   BMI 23.71 kg/m²       General Appears comfortable,not in distress. HEENT Head atraumatic. Unicteric sclera. Moist buccal mucosa. PERRLA     CVS RRR, no MRG, no JVD, no peripheral edema       Chest No deformity  No accessory muscle use  Vesicular air entry symmetrically         Abdomen &Pelvis Non distended. Mild generalized tenderness. Hypoactive bowel sounds. small midline surgical scar. Genitourinary  No CVA or suprapubic tenderness       Musculoskeletal R arm AVF+thrill. Skin No erythema,rash,depigmentation       Neurology Mental status: alert and oriented x4  Cranial nerves: CN 2-12 intact.   Motor 5/5 through out     Psychiatry            Intake/Output Summary (Last 24 hours) at 8/9/2019 1245  Last data filed at 8/8/2019 1809  Gross per 24 hour   Intake 120 ml   Output 2500 ml   Net -2380 ml          Recent labs & imaging reviewed:    Problem List as of 8/9/2019 Date Reviewed: 7/31/2019          Codes Class Noted - Resolved    * (Principal) SBO (small bowel obstruction) ([facility] ICD-10-CM: G32.363  ICD-9-CM: 560.9  7/31/2019 - Present        HCAP (healthcare-associated pneumonia) ICD-10-CM: J18.9  ICD-9-CM: 486  7/16/2019 - Present        Anemia due to blood loss ICD-10-CM: D50.0  ICD-9-CM: 280.0  4/21/2019 - Present        Vaginal bleeding ICD-10-CM: N93.9  ICD-9-CM: 623.8  4/18/2019 - Present        Hyperkalemia ICD-10-CM: E87.5  ICD-9-CM: 276.7  3/4/2019 - Present        ESRD on hemodialysis ([facility] ICD-10-CM: N18.6, Z99.2  ICD-9-CM: 585.6, V45.11  8/27/2018 - Present        SOB (shortness of breath) ICD-10-CM: R06.02  ICD-9-CM: 786.05  8/15/2018 - Present        Rib pain on right side ICD-10-CM: R07.81  ICD-9-CM: 786.50  7/2/2018 - Present        Troponin level elevated ICD-10-CM: R74.8  ICD-9-CM: 790.6  6/17/2018 - Present        Intra-abdominal abscess ([facility]) ICD-10-CM: K65.1  ICD-9-CM: 567.22 5/12/2018 - Present        Sepsis ([facility] ICD-10-CM: A41.9  ICD-9-CM: 038.9, 995.91  4/29/2018 - Present        Generalized abdominal pain ICD-10-CM: R10.84  ICD-9-CM: 789.07  4/4/2018 - Present        Other constipation ICD-10-CM: K59.09  ICD-9-CM: 564.09  4/4/2018 - Present        Other ascites ICD-10-CM: R18.8  ICD-9-CM: 789.59  4/4/2018 - Present        Peritonitis ([facility] ICD-10-CM: K65.9  ICD-9-CM: 567.9  3/11/2018 - Present        History of pulmonary embolism ICD-10-CM: X90.317  ICD-9-CM: V12.55  12/8/2017 - Present        Hypomagnesemia ICD-10-CM: E83.42  ICD-9-CM: 275.2  10/30/2017 - Present        Hypocalcemia ICD-10-CM: E83.51  ICD-9-CM: 275.41  10/30/2017 - Present        Hypokalemia ICD-10-CM: E87.6  ICD-9-CM: 276.8  10/27/2017 - Present        Hypertension (Chronic) ICD-10-CM: I10  ICD-9-CM: 401.9  10/14/2017 - Present        Chronic bilateral low back pain without sciatica ICD-10-CM: M54.5, G89.29  ICD-9-CM: 724.2, 338.29  5/26/2017 - Present        Anemia of renal disease ICD-10-CM: N18.9, D63.1  ICD-9-CM: 285.21  2/21/2017 - Present        Dependence on peritoneal dialysis [facility]) ICD-10-CM: Z99.2  ICD-9-CM: V45.11  2/21/2017 - Present        ACP (advance care planning) ICD-10-CM: Z71.89  ICD-9-CM: V65.49  2/21/2017 - Present        Malignant hypertension ICD-10-CM: I10  ICD-9-CM: 401.0  7/11/2016 - Present        Encounter for monitoring opioid maintenance therapy ICD-10-CM: Z51.81, Z79.891  ICD-9-CM: V58.83, V58.69  11/3/2015 - Present        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  ICD-9-CM: 710.0, 583.81  3/10/2012 - Present        Lupus ICD-10-CM: M32.9  ICD-9-CM: 710.0  2/27/2012 - Present        RESOLVED: Neutropenia ([facility] ICD-10-CM: D70.9  ICD-9-CM: 288.00  9/15/2018 - 9/19/2018        RESOLVED: Anemia in chronic kidney disease ICD-10-CM: N18.9, D63.1  ICD-9-CM: 285.21  9/15/2018 - 9/19/2018        RESOLVED: Malignant hypertensive urgency ICD-10-CM: I16.0  ICD-9-CM: 401.0  9/10/2018 - 9/19/2018 RESOLVED: Hypertensive urgency ICD-10-CM: I16.0  ICD-9-CM: 401.9  7/19/2018 - 7/22/2018        RESOLVED: Sepsis ([facility] ICD-10-CM: A41.9  ICD-9-CM: 038.9, 995.91  12/12/2017 - 12/22/2017        RESOLVED: Pneumonia ICD-10-CM: J18.9  ICD-9-CM: 084  10/14/2017 - 12/8/2017        RESOLVED: Pleural effusion, left ICD-10-CM: J90  ICD-9-CM: 511.9  10/14/2017 - 12/8/2017        RESOLVED: ESRD on peritoneal dialysis ([facility] (Chronic) ICD-10-CM: N18.6, Z99.2  ICD-9-CM: 585.6, V45.11  10/7/2016 - 8/27/2018        RESOLVED: Idiopathic chronic inflammatory bowel disease ICD-10-CM: K63.89  ICD-9-CM: 558.9  8/28/2013 - 8/28/2013        RESOLVED: Abdominal pain ICD-10-CM: R10.9  ICD-9-CM: 789.00  8/28/2013 - 9/3/2013        RESOLVED: Hypoxia ICD-10-CM: R09.02  ICD-9-CM: 799.02  4/25/2013 - 4/30/2013        RESOLVED: Lupus nephritis ([facility] ICD-10-CM: M32.14  ICD-9-CM: 710.0, 583.81  4/27/2012 - 4/28/2012                Tessa Deluca MD  Internal Medicine  Date of Service: 8/9/2019

## 2019-08-10 LAB
ANION GAP SERPL CALC-SCNC: 11 MMOL/L (ref 5–15)
BASOPHILS # BLD: 0 K/UL (ref 0–0.1)
BASOPHILS NFR BLD: 0 % (ref 0–1)
BUN SERPL-MCNC: 62 MG/DL (ref 6–20)
BUN/CREAT SERPL: 6 (ref 12–20)
CALCIUM SERPL-MCNC: 9 MG/DL (ref 8.5–10.1)
CHLORIDE SERPL-SCNC: 96 MMOL/L (ref 97–108)
CO2 SERPL-SCNC: 26 MMOL/L (ref 21–32)
CREAT SERPL-MCNC: 10.1 MG/DL (ref 0.55–1.02)
DIFFERENTIAL METHOD BLD: ABNORMAL
EOSINOPHIL # BLD: 0.1 K/UL (ref 0–0.4)
EOSINOPHIL NFR BLD: 2 % (ref 0–7)
ERYTHROCYTE [DISTWIDTH] IN BLOOD BY AUTOMATED COUNT: 15.1 % (ref 11.5–14.5)
GLUCOSE SERPL-MCNC: 97 MG/DL (ref 65–100)
HCT VFR BLD AUTO: 30.2 % (ref 35–47)
HGB BLD-MCNC: 9.3 G/DL (ref 11.5–16)
IMM GRANULOCYTES # BLD AUTO: 0.1 K/UL (ref 0–0.04)
IMM GRANULOCYTES NFR BLD AUTO: 1 % (ref 0–0.5)
LYMPHOCYTES # BLD: 1.3 K/UL (ref 0.8–3.5)
LYMPHOCYTES NFR BLD: 20 % (ref 12–49)
MCH RBC QN AUTO: 28.1 PG (ref 26–34)
MCHC RBC AUTO-ENTMCNC: 30.8 G/DL (ref 30–36.5)
MCV RBC AUTO: 91.2 FL (ref 80–99)
MONOCYTES # BLD: 1 K/UL (ref 0–1)
MONOCYTES NFR BLD: 15 % (ref 5–13)
NEUTS SEG # BLD: 4.1 K/UL (ref 1.8–8)
NEUTS SEG NFR BLD: 62 % (ref 32–75)
NRBC # BLD: 0 K/UL (ref 0–0.01)
NRBC BLD-RTO: 0 PER 100 WBC
PLATELET # BLD AUTO: 289 K/UL (ref 150–400)
PMV BLD AUTO: 9.8 FL (ref 8.9–12.9)
POTASSIUM SERPL-SCNC: 4.2 MMOL/L (ref 3.5–5.1)
RBC # BLD AUTO: 3.31 M/UL (ref 3.8–5.2)
SODIUM SERPL-SCNC: 133 MMOL/L (ref 136–145)
WBC # BLD AUTO: 6.6 K/UL (ref 3.6–11)

## 2019-08-10 PROCEDURE — 74011250636 HC RX REV CODE- 250/636: Performed by: INTERNAL MEDICINE

## 2019-08-10 PROCEDURE — 74011250637 HC RX REV CODE- 250/637: Performed by: INTERNAL MEDICINE

## 2019-08-10 PROCEDURE — 85025 COMPLETE CBC W/AUTO DIFF WBC: CPT

## 2019-08-10 PROCEDURE — 74011000250 HC RX REV CODE- 250: Performed by: INTERNAL MEDICINE

## 2019-08-10 PROCEDURE — 90935 HEMODIALYSIS ONE EVALUATION: CPT

## 2019-08-10 PROCEDURE — 80048 BASIC METABOLIC PNL TOTAL CA: CPT

## 2019-08-10 PROCEDURE — 36415 COLL VENOUS BLD VENIPUNCTURE: CPT

## 2019-08-10 PROCEDURE — 74011250636 HC RX REV CODE- 250/636: Performed by: NURSE PRACTITIONER

## 2019-08-10 PROCEDURE — 65270000029 HC RM PRIVATE

## 2019-08-10 PROCEDURE — C9113 INJ PANTOPRAZOLE SODIUM, VIA: HCPCS | Performed by: INTERNAL MEDICINE

## 2019-08-10 PROCEDURE — 74011250637 HC RX REV CODE- 250/637: Performed by: HOSPITALIST

## 2019-08-10 PROCEDURE — 74011636637 HC RX REV CODE- 636/637: Performed by: INTERNAL MEDICINE

## 2019-08-10 RX ORDER — MAG HYDROX/ALUMINUM HYD/SIMETH 200-200-20
30 SUSPENSION, ORAL (FINAL DOSE FORM) ORAL
Status: DISCONTINUED | OUTPATIENT
Start: 2019-08-10 | End: 2019-08-14 | Stop reason: HOSPADM

## 2019-08-10 RX ADMIN — DICYCLOMINE HYDROCHLORIDE 20 MG: 20 TABLET ORAL at 13:01

## 2019-08-10 RX ADMIN — DICYCLOMINE HYDROCHLORIDE 20 MG: 20 TABLET ORAL at 21:16

## 2019-08-10 RX ADMIN — SODIUM CHLORIDE 40 MG: 9 INJECTION INTRAMUSCULAR; INTRAVENOUS; SUBCUTANEOUS at 21:16

## 2019-08-10 RX ADMIN — MORPHINE SULFATE 2 MG: 2 INJECTION, SOLUTION INTRAMUSCULAR; INTRAVENOUS at 22:32

## 2019-08-10 RX ADMIN — SODIUM CHLORIDE 10 ML: 9 INJECTION INTRAMUSCULAR; INTRAVENOUS; SUBCUTANEOUS at 21:16

## 2019-08-10 RX ADMIN — MORPHINE SULFATE 2 MG: 2 INJECTION, SOLUTION INTRAMUSCULAR; INTRAVENOUS at 17:23

## 2019-08-10 RX ADMIN — SODIUM CHLORIDE 40 MG: 9 INJECTION INTRAMUSCULAR; INTRAVENOUS; SUBCUTANEOUS at 12:58

## 2019-08-10 RX ADMIN — DICYCLOMINE HYDROCHLORIDE 20 MG: 20 TABLET ORAL at 05:04

## 2019-08-10 RX ADMIN — SODIUM CHLORIDE 10 ML: 9 INJECTION INTRAMUSCULAR; INTRAVENOUS; SUBCUTANEOUS at 07:37

## 2019-08-10 RX ADMIN — PREDNISONE 10 MG: 5 TABLET ORAL at 13:00

## 2019-08-10 RX ADMIN — AZATHIOPRINE 50 MG: 50 TABLET ORAL at 13:02

## 2019-08-10 RX ADMIN — HYDROXYCHLOROQUINE SULFATE 400 MG: 200 TABLET, FILM COATED ORAL at 12:59

## 2019-08-10 RX ADMIN — FERRIC CITRATE 420 MG: 210 TABLET, COATED ORAL at 13:02

## 2019-08-10 RX ADMIN — ALUMINUM HYDROXIDE, MAGNESIUM HYDROXIDE, AND SIMETHICONE 30 ML: 200; 200; 20 SUSPENSION ORAL at 18:49

## 2019-08-10 RX ADMIN — AMITRIPTYLINE HYDROCHLORIDE 75 MG: 50 TABLET, FILM COATED ORAL at 21:16

## 2019-08-10 RX ADMIN — FERRIC CITRATE 420 MG: 210 TABLET, COATED ORAL at 17:22

## 2019-08-10 RX ADMIN — MORPHINE SULFATE 2 MG: 2 INJECTION, SOLUTION INTRAMUSCULAR; INTRAVENOUS at 12:57

## 2019-08-10 RX ADMIN — GEMFIBROZIL 300 MG: 600 TABLET ORAL at 13:00

## 2019-08-10 RX ADMIN — SENNOSIDES, DOCUSATE SODIUM 2 TABLET: 50; 8.6 TABLET, FILM COATED ORAL at 13:00

## 2019-08-10 RX ADMIN — DICYCLOMINE HYDROCHLORIDE 20 MG: 20 TABLET ORAL at 17:22

## 2019-08-10 NOTE — ROUTINE PROCESS
Bedside and Verbal shift change report given to [address] (oncoming nurse) by Haley Manzo RN (offgoing nurse). Report included the following information SBAR, Kardex, Intake/Output, MAR and Recent Results.

## 2019-08-10 NOTE — PROGRESS NOTES
Bedside shift change report given to [address] [place] [address] (oncoming nurse) by Yuliya Costa RN (offgoing nurse). Report included the following information SBAR, Kardex, Intake/Output and MAR.

## 2019-08-10 NOTE — PROGRESS NOTES
Hospitalist Progress Note  Beth Silver MD  Answering service: [phone] from in house phone      Date of Service:  8/10/2019  NAME:  Marlene Vazquez                                                         :  1985                                               MRN:  [id number]    Brief admission summary   72-year-old female with past medical history significant for anemia; carditis; hypercholesteremia; lupus; chronic pain syndrome; ESRD, on dialysis; thromboembolism; and hypertension, was brought  from home via EMS with complaints of abdominal pain and vomiting. Abdominal pain started four days prior to admission. Patient also complained of associated constipation. CC: Abdominal pain. Patient C/O intermittent nausea and vomiting on am rounds. Assessment and plan   Resolving probable Partial SBO due to probable constipation and adhesions.  -Slow clinical improvement. Some intermittent nausea and vomiting.  -Was unable to tolerate contrast study for Small bowel series 19.  -Surgical F/U noted and appreciated. Hematemesis  -Hb stable  -EGD fairly unremarkable  -PPI bid  -GI eval noted and appreciated. Accelerated hypertension. Uncontrolled Hypertension. -BP improved with dialysis. Continue amlodipine,carvedilo,losartan and minoxidil     SLE. No acute flare. continue azathioprine,hydroxychloroquine and prednisone. ESRD on HD  -access: right arm AVF  -Schedule TTS  -Nephrology F/U noted and appreciated. Anemia of chronicity.  -Hb stable. -Prophylaxis: GI and DVT. -Directives: Full Code. -Plan: Trial of GI Lite diet. D/W patient and patient's daughter at bedside. Review of Systems:  A comprehensive review of systems was negative except for that written in the HPI.         PHYSICAL EXAM:  O:  Visit Vitals  /60 (BP 1 Location: Left arm, BP Patient Position: At rest)   Pulse 99   Temp 98.8 °F (37.1 °C)   Resp 18   Ht 5' 5\" (1.651 m)   Wt 64 kg (141 lb 1.5 oz)   SpO2 94%   Breastfeeding? No   BMI 23.48 kg/m²       General Appears comfortable,not in distress. HEENT Head atraumatic. Unicteric sclera. Moist buccal mucosa. PERRLA     CVS RRR, no MRG, no JVD, no peripheral edema       Chest No deformity  No accessory muscle use  Vesicular air entry symmetrically         Abdomen &Pelvis Non distended. Mild generalized tenderness. Hypoactive bowel sounds. small midline surgical scar. Genitourinary  No CVA or suprapubic tenderness       Musculoskeletal R arm AVF+thrill. Skin No erythema,rash,depigmentation       Neurology Mental status: alert and oriented x4  Cranial nerves: CN 2-12 intact.   Motor 5/5 through out     Psychiatry            Intake/Output Summary (Last 24 hours) at 8/10/2019 1412  Last data filed at 8/10/2019 1145  Gross per 24 hour   Intake 0 ml   Output 1500 ml   Net -1500 ml          Recent labs & imaging reviewed:    Problem List as of 8/10/2019 Date Reviewed: 7/31/2019          Codes Class Noted - Resolved    * (Principal) SBO (small bowel obstruction) ([facility] ICD-10-CM: K32.738  ICD-9-CM: 560.9  7/31/2019 - Present        HCAP (healthcare-associated pneumonia) ICD-10-CM: J18.9  ICD-9-CM: 486  7/16/2019 - Present        Anemia due to blood loss ICD-10-CM: D50.0  ICD-9-CM: 280.0  4/21/2019 - Present        Vaginal bleeding ICD-10-CM: N93.9  ICD-9-CM: 623.8  4/18/2019 - Present        Hyperkalemia ICD-10-CM: E87.5  ICD-9-CM: 276.7  3/4/2019 - Present        ESRD on hemodialysis ([facility] ICD-10-CM: N18.6, Z99.2  ICD-9-CM: 585.6, V45.11  8/27/2018 - Present        SOB (shortness of breath) ICD-10-CM: R06.02  ICD-9-CM: 786.05  8/15/2018 - Present        Rib pain on right side ICD-10-CM: R07.81  ICD-9-CM: 786.50  7/2/2018 - Present        Troponin level elevated ICD-10-CM: R74.8  ICD-9-CM: 790.6  6/17/2018 - Present        Intra-abdominal abscess ([facility] ICD-10-CM: K65.1  ICD-9-CM: 567.22  5/12/2018 - Present Sepsis ([facility].) ICD-10-CM: A41.9  ICD-9-CM: 038.9, 995.91  4/29/2018 - Present        Generalized abdominal pain ICD-10-CM: R10.84  ICD-9-CM: 789.07  4/4/2018 - Present        Other constipation ICD-10-CM: K59.09  ICD-9-CM: 564.09  4/4/2018 - Present        Other ascites ICD-10-CM: R18.8  ICD-9-CM: 789.59  4/4/2018 - Present        Peritonitis ([facility] ICD-10-CM: K65.9  ICD-9-CM: 567.9  3/11/2018 - Present        History of pulmonary embolism ICD-10-CM: U46.850  ICD-9-CM: V12.55  12/8/2017 - Present        Hypomagnesemia ICD-10-CM: E83.42  ICD-9-CM: 275.2  10/30/2017 - Present        Hypocalcemia ICD-10-CM: E83.51  ICD-9-CM: 275.41  10/30/2017 - Present        Hypokalemia ICD-10-CM: E87.6  ICD-9-CM: 276.8  10/27/2017 - Present        Hypertension (Chronic) ICD-10-CM: I10  ICD-9-CM: 401.9  10/14/2017 - Present        Chronic bilateral low back pain without sciatica ICD-10-CM: M54.5, G89.29  ICD-9-CM: 724.2, 338.29  5/26/2017 - Present        Anemia of renal disease ICD-10-CM: N18.9, D63.1  ICD-9-CM: 285.21  2/21/2017 - Present        Dependence on peritoneal dialysis [facility]) ICD-10-CM: Z99.2  ICD-9-CM: V45.11  2/21/2017 - Present        ACP (advance care planning) ICD-10-CM: Z71.89  ICD-9-CM: V65.49  2/21/2017 - Present        Malignant hypertension ICD-10-CM: I10  ICD-9-CM: 401.0  7/11/2016 - Present        Encounter for monitoring opioid maintenance therapy ICD-10-CM: Z51.81, Z79.891  ICD-9-CM: V58.83, V58.69  11/3/2015 - Present        Lupus nephritis ([facility] ICD-10-CM: M32.14  ICD-9-CM: 710.0, 583.81  3/10/2012 - Present        Lupus ICD-10-CM: M32.9  ICD-9-CM: 710.0  2/27/2012 - Present        RESOLVED: Neutropenia ([facility] ICD-10-CM: D70.9  ICD-9-CM: 288.00  9/15/2018 - 9/19/2018        RESOLVED: Anemia in chronic kidney disease ICD-10-CM: N18.9, D63.1  ICD-9-CM: 285.21  9/15/2018 - 9/19/2018        RESOLVED: Malignant hypertensive urgency ICD-10-CM: I16.0  ICD-9-CM: 401.0  9/10/2018 - 9/19/2018        RESOLVED: Hypertensive urgency ICD-10-CM: I16.0  ICD-9-CM: 401.9  7/19/2018 - 7/22/2018        RESOLVED: Sepsis ([facility] ICD-10-CM: A41.9  ICD-9-CM: 038.9, 995.91  12/12/2017 - 12/22/2017        RESOLVED: Pneumonia ICD-10-CM: J18.9  ICD-9-CM: 194  10/14/2017 - 12/8/2017        RESOLVED: Pleural effusion, left ICD-10-CM: J90  ICD-9-CM: 511.9  10/14/2017 - 12/8/2017        RESOLVED: ESRD on peritoneal dialysis ([facility] (Chronic) ICD-10-CM: N18.6, Z99.2  ICD-9-CM: 585.6, V45.11  10/7/2016 - 8/27/2018        RESOLVED: Idiopathic chronic inflammatory bowel disease ICD-10-CM: K63.89  ICD-9-CM: 558.9  8/28/2013 - 8/28/2013        RESOLVED: Abdominal pain ICD-10-CM: R10.9  ICD-9-CM: 789.00  8/28/2013 - 9/3/2013        RESOLVED: Hypoxia ICD-10-CM: R09.02  ICD-9-CM: 799.02  4/25/2013 - 4/30/2013        RESOLVED: Lupus nephritis ([facility] ICD-10-CM: M32.14  ICD-9-CM: 710.0, 583.81  4/27/2012 - 4/28/2012                Anaid Zhou MD  Internal Medicine  Date of Service: 8/10/2019

## 2019-08-10 NOTE — PROGRESS NOTES
Progress Note    Patient: Neida Bishop MRN: [id number]  SSN: [id number]    YOB: 1985  Age: 36 y.o. Sex: female      Admit Date: 2019    8 Days Post-Op    Procedure:  Procedure(s):  ESOPHAGOGASTRODUODENOSCOPY (EGD)    Subjective:     No acute surgical issues. Pt tolerating diet without nausea or vomiting. She is passing flatus. Objective:     Visit Vitals  /57   Pulse (!) 103   Temp 98.6 °F (37 °C) (Oral)   Resp 18   Ht 5' 5\" (1.651 m)   Wt 142 lb 8 oz (64.6 kg)   SpO2 94%   Breastfeeding? No   BMI 23.71 kg/m²       Temp (24hrs), Av.3 °F (36.8 °C), Min:98 °F (36.7 °C), Max:98.6 °F (37 °C)        Physical Exam:    Gen:  NAD  Pulm:  Unlabored  Abd:  S/mildly distended/mild TTP without guarding or rebound    Recent Results (from the past 24 hour(s))   METABOLIC PANEL, BASIC    Collection Time: 08/10/19  8:11 AM   Result Value Ref Range    Sodium 133 (L) 136 - 145 mmol/L    Potassium 4.2 3.5 - 5.1 mmol/L    Chloride 96 (L) 97 - 108 mmol/L    CO2 26 21 - 32 mmol/L    Anion gap 11 5 - 15 mmol/L    Glucose 97 65 - 100 mg/dL    BUN 62 (H) 6 - 20 MG/DL    Creatinine 10.10 (H) 0.55 - 1.02 MG/DL    BUN/Creatinine ratio 6 (L) 12 - 20      GFR est AA 5 (L) >60 ml/min/1.73m2    GFR est non-AA 4 (L) >60 ml/min/1.73m2    Calcium 9.0 8.5 - 10.1 MG/DL   CBC WITH AUTOMATED DIFF    Collection Time: 08/10/19  8:11 AM   Result Value Ref Range    WBC 6.6 3.6 - 11.0 K/uL    RBC 3.31 (L) 3.80 - 5.20 M/uL    HGB 9.3 (L) 11.5 - 16.0 g/dL    HCT 30.2 (L) 35.0 - 47.0 %    MCV 91.2 80.0 - 99.0 FL    MCH 28.1 26.0 - 34.0 PG    MCHC 30.8 30.0 - 36.5 g/dL    RDW 15.1 (H) 11.5 - 14.5 %    PLATELET 503 318 - 960 K/uL    MPV 9.8 8.9 - 12.9 FL    NRBC 0.0 0  WBC    ABSOLUTE NRBC 0.00 0.00 - 0.01 K/uL    NEUTROPHILS 62 32 - 75 %    LYMPHOCYTES 20 12 - 49 %    MONOCYTES 15 (H) 5 - 13 %    EOSINOPHILS 2 0 - 7 %    BASOPHILS 0 0 - 1 %    IMMATURE GRANULOCYTES 1 (H) 0.0 - 0.5 %    ABS.  NEUTROPHILS 4.1 1.8 - 8.0 K/UL    ABS. LYMPHOCYTES 1.3 0.8 - 3.5 K/UL    ABS. MONOCYTES 1.0 0.0 - 1.0 K/UL    ABS. EOSINOPHILS 0.1 0.0 - 0.4 K/UL    ABS. BASOPHILS 0.0 0.0 - 0.1 K/UL    ABS. IMM.  GRANS. 0.1 (H) 0.00 - 0.04 K/UL    DF AUTOMATED           Assessment:     Hospital Problems  Date Reviewed: 7/31/2019          Codes Class Noted POA    * (Principal) SBO (small bowel obstruction) ([facility] ICD-10-CM: N51.690  ICD-9-CM: 560.9  7/31/2019 Unknown              Plan/Recommendations/Medical Decision Making:     - Partial small bowel obstruction:  Appears to be resolving  - Continue GI lite diet  - Pain control  - Possible DC home tomorrow if patient is able to tolerate diet

## 2019-08-10 NOTE — DIALYSIS
TRANSFER - IN REPORT:    Verbal report received from [facility], RN on Perales Maricao  being received from room 512 for ordered procedure      Report consisted of patients Situation, Background, Assessment and   Recommendations(SBAR). Information from the following report(s) SBAR was reviewed with the receiving nurse. Opportunity for questions and clarification was provided. Assessment completed upon patients arrival to unit and care assumed.

## 2019-08-10 NOTE — DIALYSIS
HD TRANSFER - OUT REPORT:    Verbal report given to Tor Alfonso RN on Angella Jimenez ) for routine progression of care       Report consisted of patient's Situation, Background, Assessment and   Recommendations(SBAR). Information from the following report(s) Procedure Summary was reviewed with the receiving nurse. Method:  $$ Method: Hemodialysis (08/10/19 0800)    Fluid Removed  NET Fluid Removed (mL): 1500 ml (08/10/19 1145)     Patient response to treatment:  Stable    End Time  Hemodialysis End Time: 7080 (08/10/19 1145)  If not documented, dialysis nurse to update post-dialysis row in HD/Filtration flowsheet     Medications /Volume expansion agents or Fluid boluses administered during treatment? no    Post-dialysis medication administration due?  no  Remind nurse to administer post-HD medication upon return to unit. Fistula hemostasis? yes        Opportunity for questions and clarification was provided.       Patient transported with: Blue Medora

## 2019-08-10 NOTE — DIALYSIS
[facility] Dialysis Team [facility]  [phone]    Vitals   Pre   Post   Assessment   Pre   Post     Temp  Temp: 98.1 °F (36.7 °C) (08/10/19 0800)  98.6 LOC  Alert and oriented x 4 Alert and oriented x 4   HR   Pulse (Heart Rate): 90 (08/10/19 0800) 103 Lungs   Clear to auscultation denies shortness of breath  Unlabored, no accessory muscle use, denies shortness of breath    B/P   BP: 119/85 (08/10/19 0800) 105/57 Cardiac   Regular   regular    Resp   Resp Rate: 18 (08/10/19 0800) 18 Skin   Warm and dry   warm and dry    Pain level  Pain Intensity 1: 7 (08/09/19 2324) No verbal complaints at this time Edema    None detected   None detected    Orders:    Duration:   Start:    0800 End:    1145 Total:   3.5   Dialyzer:   Dialyzer/Set Up Inspection: [place] (08/10/19 0800)   K Bath:   Dialysate K (mEq/L): 2 (08/10/19 0800)   Ca Bath:   Dialysate CA (mEq/L): 2.5 (08/10/19 0800)   Na/Bicarb:   Dialysate NA (mEq/L): 140 (08/10/19 0800)   Target Fluid Removal:   Goal/Amount of Fluid to Remove (mL): 2000 mL (08/10/19 0800)   Access     Type & Location:   Right upper arm AV graft, cleansed with alcohol, cannulated prelabs drawn, flushed, 400 bfr due to venous pressure despite adjusting   Labs     Obtained/Reviewed   Critical Results Called   Date when labs were drawn-  Hgb-    HGB   Date Value Ref Range Status   08/08/2019 10.7 (L) 11.5 - 16.0 g/dL Final     K-    Potassium   Date Value Ref Range Status   08/09/2019 4.2 3.5 - 5.1 mmol/L Final     Ca-   Calcium   Date Value Ref Range Status   08/09/2019 9.6 8.5 - 10.1 MG/DL Final     Bun-   BUN   Date Value Ref Range Status   08/09/2019 37 (H) 6 - 20 MG/DL Final     Creat-   Creatinine   Date Value Ref Range Status   08/09/2019 7.82 (H) 0.55 - 1.02 MG/DL Final        Medications/ Blood Products Given     Name   Dose   Route and Time           none          Blood Volume Processed (BVP):    75 Net Fluid   Removed:  1500   Comments   Time Out Done: 5783  Primary Nurse Rpt Pre: Latoya Alberto RN   Primary Nurse Rpt Post: Isabel Keene RN   Pt Education: infection control remove dressing from graft 12 to 24 hours after dialysis verbalized understanding  Care Plan: continue current HD plan of care   Tx Summary:  0800 prelabs drawn as ordered. HD initiated. All dialysis medications reviewed  0900 reviewed blood pressures with Dr. Juana Corley target decreased by 0.5  1030 venous/arterial needle clotting as evidence by increased venous pressure of >300, needle positioned confirmed in good standing. All possible blood returned, clot noted in arterial and venous chamber. New set up, testing and recirculating completed. 1045  HD resumed. 1145 treatment completed, all possible blood returned. Hemostasis achieved <10 minutes, dressing dry and intacrt. SBAR to Isabel Keene RN, patient return to room by transport. Admiting Diagnosis: abdominal pain  Pt's previous clinic- [facility]  Consent signed - Informed Consent Verified: Yes (08/08/19 1230)  Crystalita Consent - verified   Hepatitis Status- 07/31/19 antigen negative connect care   Machine #- Machine Number: [id number] (08/10/19 0800)  Telemetry status-n/a  Pre-dialysis wt. -  n/a

## 2019-08-10 NOTE — PROGRESS NOTES
Problem: Falls - Risk of  Goal: *Absence of Falls  Description  Document [facility] Fall Risk and appropriate interventions in the flowsheet.   Outcome: Progressing Towards Goal  Note:   Fall Risk Interventions:  Mobility Interventions: Patient to call before getting OOB, Communicate number of staff needed for ambulation/transfer(Pt. reported daughter helps her when ambulating)         Medication Interventions: Teach patient to arise slowly    Elimination Interventions: Call light in reach

## 2019-08-11 PROBLEM — R11.15 EMESIS, PERSISTENT: Status: ACTIVE | Noted: 2019-08-11

## 2019-08-11 LAB
ANION GAP SERPL CALC-SCNC: 7 MMOL/L (ref 5–15)
BUN SERPL-MCNC: 33 MG/DL (ref 6–20)
BUN/CREAT SERPL: 5 (ref 12–20)
CALCIUM SERPL-MCNC: 9 MG/DL (ref 8.5–10.1)
CHLORIDE SERPL-SCNC: 99 MMOL/L (ref 97–108)
CO2 SERPL-SCNC: 30 MMOL/L (ref 21–32)
CREAT SERPL-MCNC: 6.81 MG/DL (ref 0.55–1.02)
GLUCOSE SERPL-MCNC: 107 MG/DL (ref 65–100)
POTASSIUM SERPL-SCNC: 4 MMOL/L (ref 3.5–5.1)
SODIUM SERPL-SCNC: 136 MMOL/L (ref 136–145)

## 2019-08-11 PROCEDURE — 65270000029 HC RM PRIVATE

## 2019-08-11 PROCEDURE — 74011250636 HC RX REV CODE- 250/636: Performed by: INTERNAL MEDICINE

## 2019-08-11 PROCEDURE — 74011000250 HC RX REV CODE- 250: Performed by: INTERNAL MEDICINE

## 2019-08-11 PROCEDURE — 74011250637 HC RX REV CODE- 250/637: Performed by: INTERNAL MEDICINE

## 2019-08-11 PROCEDURE — 80048 BASIC METABOLIC PNL TOTAL CA: CPT

## 2019-08-11 PROCEDURE — 74011250637 HC RX REV CODE- 250/637: Performed by: HOSPITALIST

## 2019-08-11 PROCEDURE — 74011636637 HC RX REV CODE- 636/637: Performed by: INTERNAL MEDICINE

## 2019-08-11 PROCEDURE — 74011250636 HC RX REV CODE- 250/636: Performed by: NURSE PRACTITIONER

## 2019-08-11 PROCEDURE — C9113 INJ PANTOPRAZOLE SODIUM, VIA: HCPCS | Performed by: INTERNAL MEDICINE

## 2019-08-11 PROCEDURE — 36415 COLL VENOUS BLD VENIPUNCTURE: CPT

## 2019-08-11 RX ADMIN — DICYCLOMINE HYDROCHLORIDE 20 MG: 20 TABLET ORAL at 05:04

## 2019-08-11 RX ADMIN — DICYCLOMINE HYDROCHLORIDE 20 MG: 20 TABLET ORAL at 09:46

## 2019-08-11 RX ADMIN — AZATHIOPRINE 50 MG: 50 TABLET ORAL at 08:03

## 2019-08-11 RX ADMIN — SODIUM CHLORIDE 10 ML: 9 INJECTION INTRAMUSCULAR; INTRAVENOUS; SUBCUTANEOUS at 05:04

## 2019-08-11 RX ADMIN — FERRIC CITRATE 420 MG: 210 TABLET, COATED ORAL at 16:24

## 2019-08-11 RX ADMIN — OXYCODONE AND ACETAMINOPHEN 1 TABLET: 5; 325 TABLET ORAL at 07:06

## 2019-08-11 RX ADMIN — DICYCLOMINE HYDROCHLORIDE 20 MG: 20 TABLET ORAL at 16:24

## 2019-08-11 RX ADMIN — FERRIC CITRATE 420 MG: 210 TABLET, COATED ORAL at 11:57

## 2019-08-11 RX ADMIN — CARVEDILOL 25 MG: 12.5 TABLET, FILM COATED ORAL at 16:24

## 2019-08-11 RX ADMIN — PREDNISONE 10 MG: 5 TABLET ORAL at 08:05

## 2019-08-11 RX ADMIN — FERRIC CITRATE 420 MG: 210 TABLET, COATED ORAL at 07:06

## 2019-08-11 RX ADMIN — MORPHINE SULFATE 2 MG: 2 INJECTION, SOLUTION INTRAMUSCULAR; INTRAVENOUS at 22:04

## 2019-08-11 RX ADMIN — AMITRIPTYLINE HYDROCHLORIDE 75 MG: 50 TABLET, FILM COATED ORAL at 21:19

## 2019-08-11 RX ADMIN — SODIUM CHLORIDE 40 MG: 9 INJECTION INTRAMUSCULAR; INTRAVENOUS; SUBCUTANEOUS at 08:07

## 2019-08-11 RX ADMIN — HYDROXYCHLOROQUINE SULFATE 400 MG: 200 TABLET, FILM COATED ORAL at 07:06

## 2019-08-11 RX ADMIN — SODIUM CHLORIDE 10 ML: 9 INJECTION INTRAMUSCULAR; INTRAVENOUS; SUBCUTANEOUS at 21:19

## 2019-08-11 RX ADMIN — MORPHINE SULFATE 2 MG: 2 INJECTION, SOLUTION INTRAMUSCULAR; INTRAVENOUS at 16:24

## 2019-08-11 RX ADMIN — GEMFIBROZIL 300 MG: 600 TABLET ORAL at 09:46

## 2019-08-11 RX ADMIN — SODIUM CHLORIDE 40 MG: 9 INJECTION INTRAMUSCULAR; INTRAVENOUS; SUBCUTANEOUS at 21:19

## 2019-08-11 RX ADMIN — SENNOSIDES, DOCUSATE SODIUM 2 TABLET: 50; 8.6 TABLET, FILM COATED ORAL at 08:05

## 2019-08-11 RX ADMIN — DICYCLOMINE HYDROCHLORIDE 20 MG: 20 TABLET ORAL at 21:19

## 2019-08-11 NOTE — PROGRESS NOTES
Bedside shift change report given to [address] [place] [address] (oncoming nurse) by Gayathri Cabral RN (offgoing nurse). Report included the following information SBAR, Kardex, Intake/Output and MAR.

## 2019-08-11 NOTE — PROGRESS NOTES
Problem: Small Bowel Obstruction - Non Surgical: Discharge Outcomes  Goal: *Active bowel function  Outcome: Progressing Towards Goal  Note:   Patient reports no BM since 7/31. Patient has been tolerating increased diet without nausea.

## 2019-08-11 NOTE — PROGRESS NOTES
Spiritual Care Partner Volunteer visited patient in Rm 512 on 8/11/19. Documented by:   Chaplain Watson MDiv, MACE  [phone] ([id number])

## 2019-08-11 NOTE — ROUTINE PROCESS
Bedside and Verbal shift change report given to [address] (oncoming nurse) by Alia Pastrana RN (offgoing nurse). Report included the following information SBAR, Kardex, Intake/Output, MAR and Recent Results.

## 2019-08-11 NOTE — PROGRESS NOTES
Hospitalist Progress Note  Clovis Freeman MD  Answering service: [phone] OR [phone] from in house phone      Date of Service:  2019  NAME:  Ivette Garcia                                                         :  1985                                               MRN:  [id number]    Brief admission summary   30-year-old female with past medical history significant for anemia; carditis; hypercholesteremia; lupus; chronic pain syndrome; ESRD, on dialysis; thromboembolism; and hypertension, was brought  from home via EMS with complaints of abdominal pain and vomiting. Abdominal pain started four days prior to admission. Patient also complained of associated constipation. CC: Abdominal pain. Patient with increased flatus but no bowel movement as yet. Tolerating GI lite diet so far. Assessment and plan   Resolving probable Partial SBO due to probable constipation and adhesions.  -Slow clinical improvement. Some intermittent nausea and vomiting.  -Was unable to tolerate contrast study for Small bowel series 19.  -Surgical F/U noted and appreciated. Hematemesis  -Hb stable  -EGD fairly unremarkable  -PPI bid  -GI eval noted and appreciated. Accelerated hypertension. Uncontrolled Hypertension. -BP improved with dialysis. Continue amlodipine,carvedilol,losartan and minoxidil     SLE. No acute flare. continue azathioprine,hydroxychloroquine and prednisone. ESRD on HD  -access: right arm AVF  -Schedule TTS  -Nephrology F/U noted and appreciated. Anemia of chronicity.  -Hb stable. -Prophylaxis: GI and DVT. -Directives: Full Code. -Plan: Possible D/C to home later today if cleared by surgery. D/W patient and Nursing. Review of Systems:  A comprehensive review of systems was negative except for that written in the HPI.         PHYSICAL EXAM:  O:  Visit Vitals  /89 (BP 1 Location: Left arm, BP Patient Position: Sitting)   Pulse 100 Temp 98.1 °F (36.7 °C)   Resp 16   Ht 5' 5\" (1.651 m)   Wt 64 kg (141 lb 1.5 oz)   SpO2 95%   Breastfeeding? No   BMI 23.48 kg/m²       General Appears comfortable,not in distress. HEENT Head atraumatic. Unicteric sclera. Moist buccal mucosa. PERRLA     CVS RRR, no MRG, no JVD, no peripheral edema       Chest No deformity  No accessory muscle use  Vesicular air entry symmetrically         Abdomen &Pelvis Non distended. No tenderness on deep palpation. Bowel sounds present. .small midline surgical scar. Genitourinary  No CVA or suprapubic tenderness       Musculoskeletal R arm AVF+thrill. Skin No erythema,rash,depigmentation       Neurology Mental status: alert and oriented x4  Cranial nerves: CN 2-12 intact.   Motor 5/5 through out     Psychiatry            Intake/Output Summary (Last 24 hours) at 8/11/2019 1059  Last data filed at 8/10/2019 1800  Gross per 24 hour   Intake 480 ml   Output 1500 ml   Net -1020 ml          Recent labs & imaging reviewed:    Problem List as of 8/11/2019 Date Reviewed: 7/31/2019          Codes Class Noted - Resolved    * (Principal) [facility] obstruction) ([facility] ICD-10-CM: A95.082  ICD-9-CM: 560.9  7/31/2019 - Present        HCAP (healthcare-associated pneumonia) ICD-10-CM: J18.9  ICD-9-CM: 486  7/16/2019 - Present        Anemia due to blood loss ICD-10-CM: D50.0  ICD-9-CM: 280.0  4/21/2019 - Present        Vaginal bleeding ICD-10-CM: N93.9  ICD-9-CM: 623.8  4/18/2019 - Present        Hyperkalemia ICD-10-CM: E87.5  ICD-9-CM: 276.7  3/4/2019 - Present        ESRD on hemodialysis ([facility]) ICD-10-CM: N18.6, Z99.2  ICD-9-CM: 585.6, V45.11  8/27/2018 - Present        SOB (shortness of breath) ICD-10-CM: R06.02  ICD-9-CM: 786.05  8/15/2018 - Present        Rib pain on right side ICD-10-CM: R07.81  ICD-9-CM: 786.50  7/2/2018 - Present        Troponin level elevated ICD-10-CM: R74.8  ICD-9-CM: 790.6  6/17/2018 - Present        Intra-abdominal abscess ([facility] ICD-10-CM: K65.1  ICD-9-CM: 567.22  5/12/2018 - Present        Sepsis ([facility] ICD-10-CM: A41.9  ICD-9-CM: 038.9, 995.91  4/29/2018 - Present        Generalized abdominal pain ICD-10-CM: R10.84  ICD-9-CM: 789.07  4/4/2018 - Present        Other constipation ICD-10-CM: K59.09  ICD-9-CM: 564.09  4/4/2018 - Present        Other ascites ICD-10-CM: R18.8  ICD-9-CM: 789.59  4/4/2018 - Present        Peritonitis ([facility] ICD-10-CM: K65.9  ICD-9-CM: 567.9  3/11/2018 - Present        History of pulmonary embolism ICD-10-CM: J27.662  ICD-9-CM: V12.55  12/8/2017 - Present        Hypomagnesemia ICD-10-CM: E83.42  ICD-9-CM: 275.2  10/30/2017 - Present        Hypocalcemia ICD-10-CM: E83.51  ICD-9-CM: 275.41  10/30/2017 - Present        Hypokalemia ICD-10-CM: E87.6  ICD-9-CM: 276.8  10/27/2017 - Present        Hypertension (Chronic) ICD-10-CM: I10  ICD-9-CM: 401.9  10/14/2017 - Present        Chronic bilateral low back pain without sciatica ICD-10-CM: M54.5, G89.29  ICD-9-CM: 724.2, 338.29  5/26/2017 - Present        Anemia of renal disease ICD-10-CM: N18.9, D63.1  ICD-9-CM: 285.21  2/21/2017 - Present        Dependence on peritoneal dialysis [facility]) ICD-10-CM: Z99.2  ICD-9-CM: V45.11  2/21/2017 - Present        ACP (advance care planning) ICD-10-CM: Z71.89  ICD-9-CM: V65.49  2/21/2017 - Present        Malignant hypertension ICD-10-CM: I10  ICD-9-CM: 401.0  7/11/2016 - Present        Encounter for monitoring opioid maintenance therapy ICD-10-CM: Z51.81, Z79.891  ICD-9-CM: V58.83, V58.69  11/3/2015 - Present        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  ICD-9-CM: 710.0, 583.81  3/10/2012 - Present        Lupus ICD-10-CM: M32.9  ICD-9-CM: 710.0  2/27/2012 - Present        RESOLVED: Neutropenia ([facility] ICD-10-CM: D70.9  ICD-9-CM: 288.00  9/15/2018 - 9/19/2018        RESOLVED: Anemia in chronic kidney disease ICD-10-CM: N18.9, D63.1  ICD-9-CM: 285.21  9/15/2018 - 9/19/2018        RESOLVED: Malignant hypertensive urgency ICD-10-CM: I16.0  ICD-9-CM: 401.0 9/10/2018 - 9/19/2018        RESOLVED: Hypertensive urgency ICD-10-CM: I16.0  ICD-9-CM: 401.9  7/19/2018 - 7/22/2018        RESOLVED: Sepsis ([facility] ICD-10-CM: A41.9  ICD-9-CM: 038.9, 995.91  12/12/2017 - 12/22/2017        RESOLVED: Pneumonia ICD-10-CM: J18.9  ICD-9-CM: 304  10/14/2017 - 12/8/2017        RESOLVED: Pleural effusion, left ICD-10-CM: J90  ICD-9-CM: 511.9  10/14/2017 - 12/8/2017        RESOLVED: ESRD on peritoneal dialysis ([facility] (Chronic) ICD-10-CM: N18.6, Z99.2  ICD-9-CM: 585.6, V45.11  10/7/2016 - 8/27/2018        RESOLVED: Idiopathic chronic inflammatory bowel disease ICD-10-CM: K63.89  ICD-9-CM: 558.9  8/28/2013 - 8/28/2013        RESOLVED: Abdominal pain ICD-10-CM: R10.9  ICD-9-CM: 789.00  8/28/2013 - 9/3/2013        RESOLVED: Hypoxia ICD-10-CM: R09.02  ICD-9-CM: 799.02  4/25/2013 - 4/30/2013        RESOLVED: Lupus nephritis ([facility] ICD-10-CM: M32.14  ICD-9-CM: 710.0, 583.81  4/27/2012 - 4/28/2012                Stephanie Trujillo MD  Internal Medicine  Date of Service: 8/11/2019

## 2019-08-11 NOTE — PROGRESS NOTES
Patient c/o pain, requesting medication. Offered PO Percocet, patient states she needs IV morphine. Explained patient will be unable to discharge if on IV pain medication. Patient verbalized understanding. Dr Maya Ortega aware.

## 2019-08-11 NOTE — PROGRESS NOTES
Bedside  shift change report given to Colten Salinas (oncoming nurse) by Paulina Monroy (offgoing nurse). Report included the following information SBAR, Kardex, Intake/Output, MAR, Recent Results and Quality Measures.

## 2019-08-12 PROCEDURE — 74011000250 HC RX REV CODE- 250: Performed by: NURSE PRACTITIONER

## 2019-08-12 PROCEDURE — 65270000029 HC RM PRIVATE

## 2019-08-12 PROCEDURE — 74011636637 HC RX REV CODE- 636/637: Performed by: INTERNAL MEDICINE

## 2019-08-12 PROCEDURE — 74011250636 HC RX REV CODE- 250/636: Performed by: INTERNAL MEDICINE

## 2019-08-12 PROCEDURE — 74011250636 HC RX REV CODE- 250/636: Performed by: NURSE PRACTITIONER

## 2019-08-12 PROCEDURE — C9113 INJ PANTOPRAZOLE SODIUM, VIA: HCPCS | Performed by: INTERNAL MEDICINE

## 2019-08-12 PROCEDURE — 74011250637 HC RX REV CODE- 250/637: Performed by: NURSE PRACTITIONER

## 2019-08-12 PROCEDURE — 74011250637 HC RX REV CODE- 250/637: Performed by: INTERNAL MEDICINE

## 2019-08-12 PROCEDURE — 74011000250 HC RX REV CODE- 250: Performed by: INTERNAL MEDICINE

## 2019-08-12 PROCEDURE — 74011250637 HC RX REV CODE- 250/637: Performed by: HOSPITALIST

## 2019-08-12 RX ORDER — LIDOCAINE 50 MG/G
1 PATCH TOPICAL EVERY 24 HOURS
Status: DISCONTINUED | OUTPATIENT
Start: 2019-08-13 | End: 2019-08-14 | Stop reason: HOSPADM

## 2019-08-12 RX ORDER — POLYETHYLENE GLYCOL 3350 17 G/17G
17 POWDER, FOR SOLUTION ORAL DAILY PRN
Status: DISCONTINUED | OUTPATIENT
Start: 2019-08-12 | End: 2019-08-14 | Stop reason: HOSPADM

## 2019-08-12 RX ORDER — OXYCODONE AND ACETAMINOPHEN 5; 325 MG/1; MG/1
1 TABLET ORAL ONCE
Status: COMPLETED | OUTPATIENT
Start: 2019-08-13 | End: 2019-08-12

## 2019-08-12 RX ORDER — OXYCODONE AND ACETAMINOPHEN 5; 325 MG/1; MG/1
1 TABLET ORAL
Status: DISCONTINUED | OUTPATIENT
Start: 2019-08-13 | End: 2019-08-14 | Stop reason: HOSPADM

## 2019-08-12 RX ADMIN — FERRIC CITRATE 420 MG: 210 TABLET, COATED ORAL at 07:15

## 2019-08-12 RX ADMIN — POLYETHYLENE GLYCOL 3350 17 G: 17 POWDER, FOR SOLUTION ORAL at 16:45

## 2019-08-12 RX ADMIN — ALUMINUM HYDROXIDE, MAGNESIUM HYDROXIDE, AND SIMETHICONE 30 ML: 200; 200; 20 SUSPENSION ORAL at 04:27

## 2019-08-12 RX ADMIN — OXYCODONE AND ACETAMINOPHEN 1 TABLET: 5; 325 TABLET ORAL at 18:51

## 2019-08-12 RX ADMIN — HYDROXYCHLOROQUINE SULFATE 400 MG: 200 TABLET, FILM COATED ORAL at 07:15

## 2019-08-12 RX ADMIN — DICYCLOMINE HYDROCHLORIDE 20 MG: 20 TABLET ORAL at 23:34

## 2019-08-12 RX ADMIN — DICYCLOMINE HYDROCHLORIDE 20 MG: 20 TABLET ORAL at 04:28

## 2019-08-12 RX ADMIN — SENNOSIDES, DOCUSATE SODIUM 2 TABLET: 50; 8.6 TABLET, FILM COATED ORAL at 10:52

## 2019-08-12 RX ADMIN — SODIUM CHLORIDE 10 ML: 9 INJECTION INTRAMUSCULAR; INTRAVENOUS; SUBCUTANEOUS at 16:46

## 2019-08-12 RX ADMIN — MORPHINE SULFATE 2 MG: 2 INJECTION, SOLUTION INTRAMUSCULAR; INTRAVENOUS at 04:35

## 2019-08-12 RX ADMIN — CARVEDILOL 25 MG: 12.5 TABLET, FILM COATED ORAL at 16:45

## 2019-08-12 RX ADMIN — CARVEDILOL 25 MG: 12.5 TABLET, FILM COATED ORAL at 07:18

## 2019-08-12 RX ADMIN — OXYCODONE HYDROCHLORIDE AND ACETAMINOPHEN 1 TABLET: 5; 325 TABLET ORAL at 23:32

## 2019-08-12 RX ADMIN — DICYCLOMINE HYDROCHLORIDE 20 MG: 20 TABLET ORAL at 10:53

## 2019-08-12 RX ADMIN — SODIUM CHLORIDE 10 ML: 9 INJECTION INTRAMUSCULAR; INTRAVENOUS; SUBCUTANEOUS at 23:28

## 2019-08-12 RX ADMIN — GEMFIBROZIL 300 MG: 600 TABLET ORAL at 10:53

## 2019-08-12 RX ADMIN — SODIUM CHLORIDE 40 MG: 9 INJECTION INTRAMUSCULAR; INTRAVENOUS; SUBCUTANEOUS at 23:28

## 2019-08-12 RX ADMIN — DICYCLOMINE HYDROCHLORIDE 20 MG: 20 TABLET ORAL at 16:45

## 2019-08-12 RX ADMIN — AMITRIPTYLINE HYDROCHLORIDE 75 MG: 50 TABLET, FILM COATED ORAL at 23:34

## 2019-08-12 RX ADMIN — AZATHIOPRINE 50 MG: 50 TABLET ORAL at 10:54

## 2019-08-12 RX ADMIN — SODIUM CHLORIDE 40 MG: 9 INJECTION INTRAMUSCULAR; INTRAVENOUS; SUBCUTANEOUS at 10:54

## 2019-08-12 RX ADMIN — FERRIC CITRATE 420 MG: 210 TABLET, COATED ORAL at 12:36

## 2019-08-12 RX ADMIN — FERRIC CITRATE 420 MG: 210 TABLET, COATED ORAL at 16:45

## 2019-08-12 RX ADMIN — PREDNISONE 10 MG: 5 TABLET ORAL at 10:53

## 2019-08-12 RX ADMIN — SODIUM CHLORIDE 10 ML: 9 INJECTION INTRAMUSCULAR; INTRAVENOUS; SUBCUTANEOUS at 07:15

## 2019-08-12 NOTE — PROGRESS NOTES
8/12/19 -   SURJIT:  - Patient is tolerating GI Lite diet  - Has passed gas  - No BM yet  - Anticipated discharge today, 8/12, to own home with transport via family  CRM: Toney Cesar, MPH, [facility]; Z: [phone]

## 2019-08-12 NOTE — PROGRESS NOTES
Problem: Falls - Risk of  Goal: *Absence of Falls  Description  Document Gena Taylor Fall Risk and appropriate interventions in the flowsheet. Outcome: Progressing Towards Goal  Note:   Fall Risk Interventions:  Mobility Interventions: Patient to call before getting OOB         Medication Interventions: Teach patient to arise slowly, Patient to call before getting OOB    Elimination Interventions:  Toileting schedule/hourly rounds, Toilet paper/wipes in reach, Call light in reach              Problem: Patient Education: Go to Patient Education Activity  Goal: Patient/Family Education  Outcome: Progressing Towards Goal

## 2019-08-12 NOTE — PROGRESS NOTES
Spiritual Care Partner Volunteer visited patient in room 512 on 8.12.19. Documented by: : Rev. Yas Chan.  Corey Martinez; [facility], to contact [phone] [address] call: [phone]

## 2019-08-12 NOTE — PROGRESS NOTES
Problem: Falls - Risk of  Goal: *Absence of Falls  Description  Document Juan Diego Smith Fall Risk and appropriate interventions in the flowsheet.   Outcome: Progressing Towards Goal  Note:   Fall Risk Interventions:  Mobility Interventions: Communicate number of staff needed for ambulation/transfer         Medication Interventions: Teach patient to arise slowly, Patient to call before getting OOB    Elimination Interventions: Call light in reach              Problem: Patient Education: Go to Patient Education Activity  Goal: Patient/Family Education  Outcome: Progressing Towards Goal     Problem: Small Bowel Obstruction: Day 1  Goal: Off Pathway (Use only if patient is Off Pathway)  Outcome: Progressing Towards Goal  Goal: Activity/Safety  Outcome: Progressing Towards Goal  Goal: Consults, if ordered  Outcome: Progressing Towards Goal  Goal: Diagnostic Test/Procedures  Outcome: Progressing Towards Goal  Goal: Nutrition/Diet  Outcome: Progressing Towards Goal  Goal: Medications  Outcome: Progressing Towards Goal  Goal: Respiratory  Outcome: Progressing Towards Goal  Goal: Treatments/Interventions/Procedures  Outcome: Progressing Towards Goal  Goal: Psychosocial  Outcome: Progressing Towards Goal  Goal: *Optimal pain control at patient's stated goal  Outcome: Progressing Towards Goal  Goal: *Adequate urinary output (equal to or greater than 30 milliliters/hour)  Outcome: Progressing Towards Goal  Goal: *Hemodynamically stable  Outcome: Progressing Towards Goal  Goal: *Demonstrates progressive activity  Outcome: Progressing Towards Goal  Goal: *Absence of nausea/vomiting  Outcome: Progressing Towards Goal     Problem: Hypertension  Goal: *Blood pressure within specified parameters  Outcome: Progressing Towards Goal  Goal: *Fluid volume balance  Outcome: Progressing Towards Goal  Goal: *Labs within defined limits  Outcome: Progressing Towards Goal     Problem: Patient Education: Go to Patient Education Activity  Goal: Patient/Family Education  Outcome: Progressing Towards Goal     Problem: Small Bowel Obstruction - Non Surgical: Discharge Outcomes  Goal: *Hemodynamically stable  Outcome: Progressing Towards Goal  Goal: *Demonstrates independent activity or return to baseline  Outcome: Progressing Towards Goal  Goal: *Optimal pain control at patient's stated goal  Outcome: Progressing Towards Goal  Goal: *Verbalizes understanding and describes prescribed diet  Outcome: Progressing Towards Goal  Goal: *Tolerating diet  Outcome: Progressing Towards Goal  Goal: *Verbalizes name, dosage, time, side effects, and number of days to continue medications  Outcome: Progressing Towards Goal  Goal: *Anxiety reduced or absent  Outcome: Progressing Towards Goal  Goal: *Understands and describes signs and symptoms to report to providers(Stroke Metric)  Outcome: Progressing Towards Goal  Goal: *Describes follow-up/return visits to physicians  Outcome: Progressing Towards Goal  Goal: *Describes available resources and support systems  Outcome: Progressing Towards Goal  Goal: *Active bowel function  Outcome: Progressing Towards Goal     Problem: Patient Education: Go to Patient Education Activity  Goal: Patient/Family Education  Outcome: Progressing Towards Goal

## 2019-08-12 NOTE — PROGRESS NOTES
Hospitalist Progress Note  Lamar Goddard MD  Answering service: [phone] from in house phone      Date of Service:  2019  NAME:  Valorie Velasquez                                                         :  1985                                               MRN:  [id number]    Brief admission summary   52-year-old female with past medical history significant for anemia; carditis; hypercholesteremia; lupus; chronic pain syndrome; ESRD, on dialysis; thromboembolism; and hypertension, was brought  from home via EMS with complaints of abdominal pain and vomiting. Abdominal pain started four days prior to admission. Patient also complained of associated constipation. CC: Abdominal pain. Patient with increased flatus but no bowel movement as yet. Tolerating GI lite diet so far. C/O some intermittent chest pains with respiration. Assessment and plan   Resolving probable Partial SBO due to probable constipation and adhesions.  -Slow clinical improvement. Some intermittent nausea and vomiting.  -Was unable to tolerate contrast study for Small bowel series 19.  -Surgical F/U noted and appreciated. Hematemesis  -Hb stable  -EGD fairly unremarkable  -PPI bid  -GI eval noted and appreciated. Accelerated hypertension. Uncontrolled Hypertension. -BP improved with dialysis. Continue amlodipine,carvedilol,losartan and minoxidil     SLE. No acute flare. continue azathioprine,hydroxychloroquine and prednisone. ESRD on HD  -access: right arm AVF  -Schedule TTS  -Nephrology F/U noted and appreciated. Anemia of chronicity.  -Hb stable. -Prophylaxis: GI and DVT. -Directives: Full Code. -Plan: Possible D/C to home later today if cleared by surgery. D/W patient and Nursing. Review of Systems:  A comprehensive review of systems was negative except for that written in the HPI.         PHYSICAL EXAM:  O:  Visit Vitals  /73 (BP 1 Location: Left arm, BP Patient Position: At rest)   Pulse 91   Temp 98.4 °F (36.9 °C)   Resp 18   Ht 5' 5\" (1.651 m)   Wt 64.5 kg (142 lb 3.2 oz)   SpO2 92%   Breastfeeding? No   BMI 23.66 kg/m²       General Appears comfortable,not in distress. HEENT Head atraumatic. Unicteric sclera. Moist buccal mucosa. PERRLA     CVS RRR, no MRG, no JVD, no peripheral edema       Chest No deformity  No accessory muscle use  Vesicular air entry symmetrically         Abdomen &Pelvis Non distended. No tenderness on deep palpation. Bowel sounds present. .small midline surgical scar. Genitourinary  No CVA or suprapubic tenderness       Musculoskeletal R arm AVF+thrill. Skin No erythema,rash,depigmentation       Neurology Mental status: alert and oriented x4  Cranial nerves: CN 2-12 intact.   Motor 5/5 through out     Psychiatry            Intake/Output Summary (Last 24 hours) at 8/12/2019 1153  Last data filed at 8/11/2019 1800  Gross per 24 hour   Intake 600 ml   Output --   Net 600 ml          Recent labs & imaging reviewed:    Problem List as of 8/12/2019 Date Reviewed: 7/31/2019          Codes Class Noted - Resolved    Emesis, persistent ICD-10-CM: R11.10  ICD-9-CM: 536.2  8/11/2019 - Present        * (Principal) SBO (small bowel obstruction) ([facility] ICD-10-CM: A58.386  ICD-9-CM: 560.9  7/31/2019 - Present        HCAP (healthcare-associated pneumonia) ICD-10-CM: J18.9  ICD-9-CM: 486  7/16/2019 - Present        Anemia due to blood loss ICD-10-CM: D50.0  ICD-9-CM: 280.0  4/21/2019 - Present        Vaginal bleeding ICD-10-CM: N93.9  ICD-9-CM: 623.8  4/18/2019 - Present        Hyperkalemia ICD-10-CM: E87.5  ICD-9-CM: 276.7  3/4/2019 - Present        ESRD on hemodialysis ([facility] ICD-10-CM: N18.6, Z99.2  ICD-9-CM: 585.6, V45.11  8/27/2018 - Present        SOB (shortness of breath) ICD-10-CM: R06.02  ICD-9-CM: 786.05  8/15/2018 - Present        Rib pain on right side ICD-10-CM: R07.81  ICD-9-CM: 786.50  7/2/2018 - Present Troponin level elevated ICD-10-CM: R74.8  ICD-9-CM: 790.6  6/17/2018 - Present        Intra-abdominal abscess ([facility] ICD-10-CM: K65.1  ICD-9-CM: 567.22  5/12/2018 - Present        Sepsis ([facility] ICD-10-CM: A41.9  ICD-9-CM: 038.9, 995.91  4/29/2018 - Present        Generalized abdominal pain ICD-10-CM: R10.84  ICD-9-CM: 789.07  4/4/2018 - Present        Other constipation ICD-10-CM: K59.09  ICD-9-CM: 564.09  4/4/2018 - Present        Other ascites ICD-10-CM: R18.8  ICD-9-CM: 789.59  4/4/2018 - Present        Peritonitis ([facility] ICD-10-CM: K65.9  ICD-9-CM: 567.9  3/11/2018 - Present        History of pulmonary embolism ICD-10-CM: R93.137  ICD-9-CM: V12.55  12/8/2017 - Present        Hypomagnesemia ICD-10-CM: E83.42  ICD-9-CM: 275.2  10/30/2017 - Present        Hypocalcemia ICD-10-CM: E83.51  ICD-9-CM: 275.41  10/30/2017 - Present        Hypokalemia ICD-10-CM: E87.6  ICD-9-CM: 276.8  10/27/2017 - Present        Hypertension (Chronic) ICD-10-CM: I10  ICD-9-CM: 401.9  10/14/2017 - Present        Chronic bilateral low back pain without sciatica ICD-10-CM: M54.5, G89.29  ICD-9-CM: 724.2, 338.29  5/26/2017 - Present        Anemia of renal disease ICD-10-CM: N18.9, D63.1  ICD-9-CM: 285.21  2/21/2017 - Present        Dependence on peritoneal dialysis [facility]) ICD-10-CM: Z99.2  ICD-9-CM: V45.11  2/21/2017 - Present        ACP (advance care planning) ICD-10-CM: Z71.89  ICD-9-CM: V65.49  2/21/2017 - Present        Malignant hypertension ICD-10-CM: I10  ICD-9-CM: 401.0  7/11/2016 - Present        Encounter for monitoring opioid maintenance therapy ICD-10-CM: Z51.81, Z79.891  ICD-9-CM: V58.83, V58.69  11/3/2015 - Present        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  ICD-9-CM: 710.0, 583.81  3/10/2012 - Present        Lupus ICD-10-CM: M32.9  ICD-9-CM: 710.0  2/27/2012 - Present        RESOLVED: Neutropenia ([facility] ICD-10-CM: D70.9  ICD-9-CM: 288.00  9/15/2018 - 9/19/2018        RESOLVED: Anemia in chronic kidney disease ICD-10-CM: N18.9, D63.1  ICD-9-CM: 285.21  9/15/2018 - 9/19/2018        RESOLVED: Malignant hypertensive urgency ICD-10-CM: I16.0  ICD-9-CM: 401.0  9/10/2018 - 9/19/2018        RESOLVED: Hypertensive urgency ICD-10-CM: I16.0  ICD-9-CM: 401.9  7/19/2018 - 7/22/2018        RESOLVED: Sepsis ([facility] ICD-10-CM: A41.9  ICD-9-CM: 038.9, 995.91  12/12/2017 - 12/22/2017        RESOLVED: Pneumonia ICD-10-CM: J18.9  ICD-9-CM: 487  10/14/2017 - 12/8/2017        RESOLVED: Pleural effusion, left ICD-10-CM: J90  ICD-9-CM: 511.9  10/14/2017 - 12/8/2017        RESOLVED: ESRD on peritoneal dialysis ([facility] (Chronic) ICD-10-CM: N18.6, Z99.2  ICD-9-CM: 585.6, V45.11  10/7/2016 - 8/27/2018        RESOLVED: Idiopathic chronic inflammatory bowel disease ICD-10-CM: K63.89  ICD-9-CM: 558.9  8/28/2013 - 8/28/2013        RESOLVED: Abdominal pain ICD-10-CM: R10.9  ICD-9-CM: 789.00  8/28/2013 - 9/3/2013        RESOLVED: Hypoxia ICD-10-CM: R09.02  ICD-9-CM: 799.02  4/25/2013 - 4/30/2013        RESOLVED: Lupus nephritis ([facility] ICD-10-CM: M32.14  ICD-9-CM: 710.0, 583.81  4/27/2012 - 4/28/2012                Kate Boyd MD  Internal Medicine  Date of Service: 8/12/2019

## 2019-08-12 NOTE — PROGRESS NOTES
Patient requesting Morphine for pain. Educated patient on importance of taking prn percocet for pain as patient will not go home with prescription for IV morphine. Pt stated she understood and requested for morphine.

## 2019-08-12 NOTE — PROGRESS NOTES
[facility]  Daily Progress Note    Admit Date: 2019    Subjective:     Last 24 hrs: Denies abdominal pain. Tolerating food, but not much appetite.  + flatus. States her last BM was about a week ago. Taking lactulose and pericolace. She feels better and is eager to go home. Last KUB on 19 had non-specific bowel gas pattern. Imaging on on 19: She was unable to complete the small bowel follow-through as she did not tolerate contrast.  KUB showed diffuse gaseous  distention of the small bowel without without evidence of obstruction       Objective:     Blood pressure 109/73, pulse 91, temperature 98.4 °F (36.9 °C), resp. rate 18, height 5' 5\" (1.651 m), weight 64.5 kg (142 lb 3.2 oz), SpO2 92 %, not currently breastfeeding. Temp (24hrs), Av.4 °F (36.9 °C), Min:98.4 °F (36.9 °C), Max:98.4 °F (36.9 °C)      _____________________  Physical Exam:     Sleeping, awakens easily. Cardiovascular: RRR, no peripheral edema  Lungs:CTAB   Abdomen: soft, NT. Assessment:   Principal Problem:    SBO (small bowel obstruction) ([address] (2019)    Active Problems:    Emesis, persistent (2019)            Plan:     No surgical indication  Tolerating PO  Continue bowel regimen          Otoniel Nieves, ACNP - [address] [facility] [place] [address],  [facility], [address] [place] [address]  [phone]    Data Review:    Recent Labs     08/10/19  0811   WBC 6.6   HGB 9.3*   HCT 30.2*        Recent Labs     19  0506 08/10/19  0811    133*   K 4.0 4.2   CL 99 96*   CO2 30 26   * 97   BUN 33* 62*   CREA 6.81* 10.10*   CA 9.0 9.0     No results for input(s): AML, LPSE in the last 72 hours.         ______________________  Medications:    Current Facility-Administered Medications   Medication Dose Route Frequency    polyethylene glycol (MIRALAX) packet 17 g  17 g Oral DAILY PRN    [START ON 2019] epoetin pia-epbx (RETACRIT) injection 8,000 Units  8,000 Units SubCUTAneous DIALYSIS TUE, THU & SAT    alum-mag hydroxide-simeth (MYLANTA) oral suspension 30 mL  30 mL Oral Q6H PRN    morphine injection 1-2 mg  1-2 mg IntraVENous Q4H PRN    lactulose (CHRONULAC) 10 gram/15 mL solution 30 mL  20 g Oral BID    senna-docusate (PERICOLACE) 8.6-50 mg per tablet 2 Tab  2 Tab Oral DAILY    cloNIDine HCl (CATAPRES) tablet 0.1 mg  0.1 mg Oral Q6H PRN    amitriptyline (ELAVIL) tablet 75 mg  75 mg Oral QHS    amLODIPine (NORVASC) tablet 10 mg  10 mg Oral DAILY    azaTHIOprine (IMURAN) tablet 50 mg  50 mg Oral DAILY AFTER BREAKFAST    acetaminophen (TYLENOL) tablet 1,000 mg  1,000 mg Oral Q6H PRN    carvedilol (COREG) tablet 25 mg  25 mg Oral BID WITH MEALS    dicyclomine (BENTYL) tablet 20 mg  20 mg Oral Q6H    ferric citrate (AURYXIA) tablet 420 mg  420 mg Oral TID WITH MEALS    gemfibrozil (LOPID) tablet 300 mg  300 mg Oral DAILY    hydroxychloroquine (PLAQUENIL) tablet 400 mg  400 mg Oral DAILY WITH BREAKFAST    losartan (COZAAR) tablet 100 mg  100 mg Oral DAILY    minoxidil (LONITEN) tablet 2.5 mg  2.5 mg Oral DAILY    oxyCODONE-acetaminophen (PERCOCET) 5-325 mg per tablet 1 Tab  1 Tab Oral Q8H PRN    predniSONE (DELTASONE) tablet 10 mg  10 mg Oral DAILY    sodium chloride (NS) flush 5-40 mL  5-40 mL IntraVENous Q8H    sodium chloride (NS) flush 5-40 mL  5-40 mL IntraVENous PRN    ondansetron (ZOFRAN) injection 4 mg  4 mg IntraVENous Q4H PRN    pantoprazole (PROTONIX) 40 mg in sodium chloride 0.9% 10 mL injection  40 mg IntraVENous Q12H    bisacodyl (DULCOLAX) suppository 10 mg  10 mg Rectal DAILY PRN    albuterol-ipratropium (DUO-NEB) 2.5 MG-0.5 MG/3 ML  3 mL Nebulization Q4H PRN

## 2019-08-12 NOTE — PROGRESS NOTES
Bedside shift change report given to Elizabeth Deal (oncoming nurse) by Sis Page (offgoing nurse). Report included the following information SBAR, Kardex, Intake/Output, MAR, Accordion, Recent Results and Quality Measures.

## 2019-08-12 NOTE — PROGRESS NOTES
RENAL  PROGRESS NOTE        Subjective:     \"I feel fine. \"  No dyspnea. No n/V. No pain. Objective:   VITALS SIGNS:    Visit Vitals  /73 (BP 1 Location: Left arm, BP Patient Position: At rest)   Pulse 91   Temp 98.4 °F (36.9 °C)   Resp 18   Ht 5' 5\" (1.651 m)   Wt 64.5 kg (142 lb 3.2 oz)   SpO2 92%   Breastfeeding? No   BMI 23.66 kg/m²       O2 Device: Room air       Temp (24hrs), Av.4 °F (36.9 °C), Min:98.4 °F (36.9 °C), Max:98.4 °F (36.9 °C)         PHYSICAL EXAM:    NAD  No edema       DATA REVIEW:     INTAKE / OUTPUT:   Last shift:      No intake/output data recorded. Last 3 shifts: 08/10 1901 -  0700  In: 840 [P.O.:840]  Out: -     Intake/Output Summary (Last 24 hours) at 2019 1214  Last data filed at 2019 1800  Gross per 24 hour   Intake 360 ml   Output --   Net 360 ml         LABS:   Recent Labs     08/10/19  0811   WBC 6.6   HGB 9.3*   HCT 30.2*        Recent Labs     19  0506 08/10/19  0811    133*   K 4.0 4.2   CL 99 96*   CO2 30 26   * 97   BUN 33* 62*   CREA 6.81* 10.10*   CA 9.0 9.0           Assessment:     ESRD: TTS [facility]  HTN: Uncontrolled-> often related to interdialytic fluid gains  Partial SBO: 2 to constipation /maybe adhesions. Clinically improving  Anemia    Hematemesis  SLE      Plan:       No acute need for HD today.   Plan HD in AM.    Start retacrit

## 2019-08-13 LAB
ANION GAP SERPL CALC-SCNC: 10 MMOL/L (ref 5–15)
BUN SERPL-MCNC: 77 MG/DL (ref 6–20)
BUN/CREAT SERPL: 7 (ref 12–20)
CALCIUM SERPL-MCNC: 9.1 MG/DL (ref 8.5–10.1)
CHLORIDE SERPL-SCNC: 96 MMOL/L (ref 97–108)
CO2 SERPL-SCNC: 26 MMOL/L (ref 21–32)
CREAT SERPL-MCNC: 10.7 MG/DL (ref 0.55–1.02)
ERYTHROCYTE [DISTWIDTH] IN BLOOD BY AUTOMATED COUNT: 15.3 % (ref 11.5–14.5)
GLUCOSE SERPL-MCNC: 88 MG/DL (ref 65–100)
HCT VFR BLD AUTO: 30.9 % (ref 35–47)
HGB BLD-MCNC: 9.6 G/DL (ref 11.5–16)
MCH RBC QN AUTO: 28.7 PG (ref 26–34)
MCHC RBC AUTO-ENTMCNC: 31.1 G/DL (ref 30–36.5)
MCV RBC AUTO: 92.2 FL (ref 80–99)
NRBC # BLD: 0 K/UL (ref 0–0.01)
NRBC BLD-RTO: 0 PER 100 WBC
PHOSPHATE SERPL-MCNC: 6.6 MG/DL (ref 2.6–4.7)
PLATELET # BLD AUTO: 260 K/UL (ref 150–400)
PMV BLD AUTO: 9.8 FL (ref 8.9–12.9)
POTASSIUM SERPL-SCNC: 4.9 MMOL/L (ref 3.5–5.1)
RBC # BLD AUTO: 3.35 M/UL (ref 3.8–5.2)
SODIUM SERPL-SCNC: 132 MMOL/L (ref 136–145)
WBC # BLD AUTO: 12.3 K/UL (ref 3.6–11)

## 2019-08-13 PROCEDURE — C9113 INJ PANTOPRAZOLE SODIUM, VIA: HCPCS | Performed by: INTERNAL MEDICINE

## 2019-08-13 PROCEDURE — 84100 ASSAY OF PHOSPHORUS: CPT

## 2019-08-13 PROCEDURE — 74011250636 HC RX REV CODE- 250/636: Performed by: INTERNAL MEDICINE

## 2019-08-13 PROCEDURE — 80048 BASIC METABOLIC PNL TOTAL CA: CPT

## 2019-08-13 PROCEDURE — 65270000029 HC RM PRIVATE

## 2019-08-13 PROCEDURE — 74011000250 HC RX REV CODE- 250: Performed by: INTERNAL MEDICINE

## 2019-08-13 PROCEDURE — 74011250637 HC RX REV CODE- 250/637: Performed by: INTERNAL MEDICINE

## 2019-08-13 PROCEDURE — 36415 COLL VENOUS BLD VENIPUNCTURE: CPT

## 2019-08-13 PROCEDURE — 85027 COMPLETE CBC AUTOMATED: CPT

## 2019-08-13 PROCEDURE — 74011250637 HC RX REV CODE- 250/637: Performed by: NURSE PRACTITIONER

## 2019-08-13 PROCEDURE — 74011250637 HC RX REV CODE- 250/637: Performed by: HOSPITALIST

## 2019-08-13 PROCEDURE — 74011636637 HC RX REV CODE- 636/637: Performed by: INTERNAL MEDICINE

## 2019-08-13 PROCEDURE — 94760 N-INVAS EAR/PLS OXIMETRY 1: CPT

## 2019-08-13 RX ADMIN — DICYCLOMINE HYDROCHLORIDE 20 MG: 20 TABLET ORAL at 16:11

## 2019-08-13 RX ADMIN — DICYCLOMINE HYDROCHLORIDE 20 MG: 20 TABLET ORAL at 10:54

## 2019-08-13 RX ADMIN — AZATHIOPRINE 50 MG: 50 TABLET ORAL at 11:04

## 2019-08-13 RX ADMIN — LACTULOSE 30 ML: 20 SOLUTION ORAL at 10:54

## 2019-08-13 RX ADMIN — GEMFIBROZIL 300 MG: 600 TABLET ORAL at 10:55

## 2019-08-13 RX ADMIN — SENNOSIDES, DOCUSATE SODIUM 2 TABLET: 50; 8.6 TABLET, FILM COATED ORAL at 10:56

## 2019-08-13 RX ADMIN — SODIUM CHLORIDE 10 ML: 9 INJECTION INTRAMUSCULAR; INTRAVENOUS; SUBCUTANEOUS at 13:29

## 2019-08-13 RX ADMIN — PREDNISONE 10 MG: 5 TABLET ORAL at 10:54

## 2019-08-13 RX ADMIN — FERRIC CITRATE 420 MG: 210 TABLET, COATED ORAL at 13:28

## 2019-08-13 RX ADMIN — OXYCODONE HYDROCHLORIDE AND ACETAMINOPHEN 1 TABLET: 5; 325 TABLET ORAL at 08:01

## 2019-08-13 RX ADMIN — CARVEDILOL 25 MG: 12.5 TABLET, FILM COATED ORAL at 07:59

## 2019-08-13 RX ADMIN — FERRIC CITRATE 420 MG: 210 TABLET, COATED ORAL at 08:03

## 2019-08-13 RX ADMIN — SODIUM CHLORIDE 40 MG: 9 INJECTION INTRAMUSCULAR; INTRAVENOUS; SUBCUTANEOUS at 10:56

## 2019-08-13 RX ADMIN — SODIUM CHLORIDE 10 ML: 9 INJECTION INTRAMUSCULAR; INTRAVENOUS; SUBCUTANEOUS at 08:01

## 2019-08-13 RX ADMIN — OXYCODONE HYDROCHLORIDE AND ACETAMINOPHEN 1 TABLET: 5; 325 TABLET ORAL at 16:11

## 2019-08-13 RX ADMIN — HYDROXYCHLOROQUINE SULFATE 400 MG: 200 TABLET, FILM COATED ORAL at 07:59

## 2019-08-13 RX ADMIN — FERRIC CITRATE 420 MG: 210 TABLET, COATED ORAL at 16:11

## 2019-08-13 NOTE — PROGRESS NOTES
NUTRITION COMPLETE ASSESSMENT    RECOMMENDATIONS:   1. Continue Renal, consider regular fiber instead of GI Lite  2. Take new Phos lab to see if Ensure Enlive okay to give - added once per day for added protein, didn't want Nepro  3. Weekly weights     Interventions/Plan:   Food/Nutrient Delivery:  General/healthful diet        RD to follow for diet advancemetn    Assessment:   Reason for Assessment: [x] Follow up    Diet: Renal(Gi lite)  Supplements: Ensure Enlive once per day until Phos checked  Nutritionally Significant Medications: [x] Reviewed & Includes: coreg, bentyl, retacrit, lopid, lactulose, minoxidil, protonix, prednisone, pericolace   Meal Intake:   Patient Vitals for the past 100 hrs:   % Diet Eaten   08/11/19 1800 100 %   08/11/19 1200 75 %   08/11/19 0900 100 %   08/10/19 1800 75 %   08/10/19 1300 75 %   08/10/19 0900 0 %     Subjective:     Objective:  Pt admitted for SBO. PMHx: Lupus, ESRD on HD, previous PD prior to peritonitis, HF, hypercholesterolemia, GERD, others noted. Constipation and vomiting x 4 days PTA. EGD on 8/2 showing gastritis. Surgery following with CT showing ileus vs pSBO. Some flatus and abd pain improved. Still no BM. Pt on Pericolace and lactulose, Miaralax PRN and suppository PRN. May need additional bowel regimen. Pt tolerating diet, eating >75% on average. Denies weight loss. Pt doesn't like Nepro, started Ensure Enlive once per day 2/2 increased protein needs. Discussed Crystal lite with Protein added but pt says only likes it \"sometimes. \" Pt given thick protein drink (1 oz size) at HD center as outpt, doesn't remember the name. Reports eating well prior to symptoms. Low wt of 62kg last May noted but has since regained wt. Will continue to follow for diet and nutrition needs. Encouraged pt to mobilize more.  Pt not on PhosLo    Estimated Nutrition Needs:   Kcals/day: 2100 Kcals/day  Protein: 100 g(100-118g (1.2-1.4g/kg))  Fluid: 2100 ml(or per renal)  Based On: Kcal/kg - specify (Comment)(25kcal/kg)  Weight Used: Actual wt(84kg)    Pt expected to meet estimated nutrient needs:  [x]   Yes     []  No [] Unable to predict at this time  Nutrition Diagnosis:   1. Inadequate oral intake related to altered GI fx as evidenced by SBO vs ileus; NPO/clears x 5+ days    2. (Increased protein/energy needs ) related to ESRD as evidenced by on HD    Goals:     Pt will tolerate >75% of meals and ONS for increased protein and have BM within 3-5 days     Monitoring & Evaluation:    - Total energy intake, Liquid meal replacement, Protein intake   - Lean body mass, fat free mass, GI    Previous Nutrition Goals Met:   Progressing  Previous Recommendations:    Yes    Education & Discharge Needs:   [] None Identified   [x] Identified and addressed    [x] Participated in care plan, discharge planning, and/or interdisciplinary rounds        Cultural, Faith and ethnic food preferences identified: None    Skin Integrity: [x]Intact  []Other  Edema: [x]None []Other  Last BM: 7/31 - hard  Food Allergies: [x]None []Other  Diet Restrictions: Cultural/Latter-day Preference(s): None     Anthropometrics:    Weight Loss Metrics 8/13/2019 7/30/2019 7/28/2019 7/27/2019 7/22/2019 7/17/2019 6/24/2019   Today's Wt 179 lb 14.4 oz 194 lb 0.1 oz 199 lb 4.7 oz 199 lb 11.8 oz 204 lb 9.4 oz 204 lb 9.4 oz 191 lb   BMI 29.94 kg/m2 32.28 kg/m2 33.16 kg/m2 33.24 kg/m2 34.05 kg/m2 34.05 kg/m2 31.78 kg/m2      Weight Source: Standing scale (comment)  Height: 5' 5\" (165.1 cm),    Body mass index is 29.94 kg/m².      IBW : 56.7 kg (125 lb), % IBW (Calculated): 147.68 %   ,      Labs:    Lab Results   Component Value Date/Time    Sodium 132 (L) 08/13/2019 07:35 AM    Potassium 4.9 08/13/2019 07:35 AM    Chloride 96 (L) 08/13/2019 07:35 AM    CO2 26 08/13/2019 07:35 AM    Glucose 88 08/13/2019 07:35 AM    BUN 77 (H) 08/13/2019 07:35 AM    Creatinine 10.70 (H) 08/13/2019 07:35 AM    Calcium 9.1 08/13/2019 07:35 AM    Magnesium 2.4 08/02/2019 03:57 AM    Phosphorus 8.1 (H) 08/06/2019 07:45 AM    Albumin 3.0 (L) 08/07/2019 02:40 AM     Lab Results   Component Value Date/Time    Hemoglobin A1c 5.4 09/25/2015 02:02 PM     [address] [place] [address], Pager [phone]

## 2019-08-13 NOTE — PROGRESS NOTES
8/13/19 -   SURJIT:  - PO medications as of 8/12  - Tolerating diet  - Hasn't had a BM  - HD today, 8/13  - Discharge pending clearance by surgery  - May discharge today, 8/13  CRM: Timmy Lewis, MPH, [address] [place] [address]; Z: [phone]

## 2019-08-13 NOTE — PROGRESS NOTES
Bedside shift change report given to Marisol Saldana RN (oncoming nurse) by Landen Merida RN (offgoing nurse).  Report included the following information SBAR and Kardex.   '

## 2019-08-13 NOTE — PROGRESS NOTES
Hospitalist Progress Note  Alex Trinidad MD  Answering service: [phone] OR 36 from in house phone      Date of Service:  2019  NAME:  Doron Gomez                                                         :  1985                                               MRN:  [id number]    Brief admission summary   81-year-old female with past medical history significant for anemia; carditis; hypercholesteremia; lupus; chronic pain syndrome; ESRD, on dialysis; thromboembolism; and hypertension, was brought  from home via EMS with complaints of abdominal pain and vomiting. Abdominal pain started four days prior to admission. Patient also complained of associated constipation. CC: Abdominal pain. Patient stated that she has not had a bowel movement since admission. Denied any nausea or vomiting. Assessment and plan   Resolving probable Partial SBO due to probable constipation and adhesions.  -Clinically improved but no bowel movement as yet. -Was unable to tolerate contrast study for Small bowel series 19.  -Surgical F/U noted and appreciated. Hematemesis  -Hb stable  -EGD fairly unremarkable  -PPI bid  -GI eval noted and appreciated. Accelerated hypertension. Uncontrolled Hypertension. -BP improved with dialysis. Continue amlodipine,carvedilol,losartan and minoxidil     SLE. No acute flare. continue azathioprine,hydroxychloroquine and prednisone. ESRD on HD  -access: right arm AVF  -Schedule TTS  -Nephrology F/U noted and appreciated. Anemia of chronicity.  -Hb stable. -Electrolytes: Probable multifactorial Hyponatremia.    -Prophylaxis: GI and DVT. -Directives: Full Code. -Plan: For discharge to home when cleared by surgery. D/W patient. Review of Systems:  A comprehensive review of systems was negative except for that written in the HPI.         PHYSICAL EXAM:  O:  Visit Vitals  BP (!) 141/94 (BP 1 Location: Left arm, BP Patient Position: At rest)   Pulse 83   Temp 98.2 °F (36.8 °C)   Resp 18   Ht 5' 5\" (1.651 m)   Wt 81.6 kg (179 lb 14.4 oz)   SpO2 92%   Breastfeeding? No   BMI 29.94 kg/m²       General Appears comfortable,not in distress. HEENT Head atraumatic. Unicteric sclera. Moist buccal mucosa. PERRLA     CVS RRR, no MRG, no JVD, no peripheral edema       Chest No deformity  No accessory muscle use  Vesicular air entry symmetrically         Abdomen &Pelvis Non distended. No tenderness on deep palpation. Bowel sounds present. .small midline surgical scar. Genitourinary  No CVA or suprapubic tenderness       Musculoskeletal R arm AVF+thrill. Skin No erythema,rash,depigmentation       Neurology Mental status: alert and oriented x4  Cranial nerves: CN 2-12 intact.   Motor 5/5 through out     Psychiatry          No intake or output data in the 24 hours ending 08/13/19 1015       Recent labs & imaging reviewed:    Problem List as of 8/13/2019 Date Reviewed: 7/31/2019          Codes Class Noted - Resolved    Emesis, persistent ICD-10-CM: R11.10  ICD-9-CM: 536.2  8/11/2019 - Present        * (Principal) SBO (small bowel obstruction) ([facility] ICD-10-CM: K56.609  ICD-9-CM: 560.9  7/31/2019 - Present        HCAP (healthcare-associated pneumonia) ICD-10-CM: J18.9  ICD-9-CM: 486  7/16/2019 - Present        Anemia due to blood loss ICD-10-CM: D50.0  ICD-9-CM: 280.0  4/21/2019 - Present        Vaginal bleeding ICD-10-CM: N93.9  ICD-9-CM: 623.8  4/18/2019 - Present        Hyperkalemia ICD-10-CM: E87.5  ICD-9-CM: 276.7  3/4/2019 - Present        ESRD on hemodialysis ([facility] ICD-10-CM: N18.6, Z99.2  ICD-9-CM: 585.6, V45.11  8/27/2018 - Present        SOB (shortness of breath) ICD-10-CM: R06.02  ICD-9-CM: 786.05  8/15/2018 - Present        Rib pain on right side ICD-10-CM: R07.81  ICD-9-CM: 786.50  7/2/2018 - Present        Troponin level elevated ICD-10-CM: R74.8  ICD-9-CM: 790.6  6/17/2018 - Present        Intra-abdominal abscess [facility]) ICD-10-CM: K65.1  ICD-9-CM: 567.22  5/12/2018 - Present        Sepsis ([facility] ICD-10-CM: A41.9  ICD-9-CM: 038.9, 995.91  4/29/2018 - Present        Generalized abdominal pain ICD-10-CM: R10.84  ICD-9-CM: 789.07  4/4/2018 - Present        Other constipation ICD-10-CM: K59.09  ICD-9-CM: 564.09  4/4/2018 - Present        Other ascites ICD-10-CM: R18.8  ICD-9-CM: 789.59  4/4/2018 - Present        Peritonitis ([facility] ICD-10-CM: K65.9  ICD-9-CM: 567.9  3/11/2018 - Present        History of pulmonary embolism ICD-10-CM: H05.557  ICD-9-CM: V12.55  12/8/2017 - Present        Hypomagnesemia ICD-10-CM: E83.42  ICD-9-CM: 275.2  10/30/2017 - Present        Hypocalcemia ICD-10-CM: E83.51  ICD-9-CM: 275.41  10/30/2017 - Present        Hypokalemia ICD-10-CM: E87.6  ICD-9-CM: 276.8  10/27/2017 - Present        Hypertension (Chronic) ICD-10-CM: I10  ICD-9-CM: 401.9  10/14/2017 - Present        Chronic bilateral low back pain without sciatica ICD-10-CM: M54.5, G89.29  ICD-9-CM: 724.2, 338.29  5/26/2017 - Present        Anemia of renal disease ICD-10-CM: N18.9, D63.1  ICD-9-CM: 285.21  2/21/2017 - Present        Dependence on peritoneal dialysis [facility]) ICD-10-CM: Z99.2  ICD-9-CM: V45.11  2/21/2017 - Present        ACP (advance care planning) ICD-10-CM: Z71.89  ICD-9-CM: V65.49  2/21/2017 - Present        Malignant hypertension ICD-10-CM: I10  ICD-9-CM: 401.0  7/11/2016 - Present        Encounter for monitoring opioid maintenance therapy ICD-10-CM: Z51.81, Z79.891  ICD-9-CM: V58.83, V58.69  11/3/2015 - Present        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  ICD-9-CM: 710.0, 583.81  3/10/2012 - Present        Lupus ICD-10-CM: M32.9  ICD-9-CM: 710.0  2/27/2012 - Present        RESOLVED: Neutropenia ([facility] ICD-10-CM: D70.9  ICD-9-CM: 288.00  9/15/2018 - 9/19/2018        RESOLVED: Anemia in chronic kidney disease ICD-10-CM: N18.9, D63.1  ICD-9-CM: 285.21  9/15/2018 - 9/19/2018        RESOLVED: Malignant hypertensive urgency ICD-10-CM: I16.0  ICD-9-CM: 401.0  9/10/2018 - 9/19/2018        RESOLVED: Hypertensive urgency ICD-10-CM: I16.0  ICD-9-CM: 401.9  7/19/2018 - 7/22/2018        RESOLVED: Sepsis ([facility] ICD-10-CM: A41.9  ICD-9-CM: 038.9, 995.91  12/12/2017 - 12/22/2017        RESOLVED: Pneumonia ICD-10-CM: J18.9  ICD-9-CM: 453  10/14/2017 - 12/8/2017        RESOLVED: Pleural effusion, left ICD-10-CM: J90  ICD-9-CM: 511.9  10/14/2017 - 12/8/2017        RESOLVED: ESRD on peritoneal dialysis ([facility] (Chronic) ICD-10-CM: N18.6, Z99.2  ICD-9-CM: 585.6, V45.11  10/7/2016 - 8/27/2018        RESOLVED: Idiopathic chronic inflammatory bowel disease ICD-10-CM: K63.89  ICD-9-CM: 558.9  8/28/2013 - 8/28/2013        RESOLVED: Abdominal pain ICD-10-CM: R10.9  ICD-9-CM: 789.00  8/28/2013 - 9/3/2013        RESOLVED: Hypoxia ICD-10-CM: R09.02  ICD-9-CM: 799.02  4/25/2013 - 4/30/2013        RESOLVED: Lupus nephritis ([facility] ICD-10-CM: M32.14  ICD-9-CM: 710.0, 583.81  4/27/2012 - 4/28/2012                Deya Griffith MD  Internal Medicine  Date of Service: 8/13/2019

## 2019-08-13 NOTE — PROGRESS NOTES
Bedside shift change report given to Alesia Perkins RN (oncoming nurse) by Andres Mahajan RN (offgoing nurse). Report included the following information SBAR, Kardex, Intake/Output and MAR.

## 2019-08-13 NOTE — PROGRESS NOTES
RENAL  PROGRESS NOTE        Subjective:     \"When am I getting my dialysis? \"  No dyspnea. No n/V. No pain. Objective:   VITALS SIGNS:    Visit Vitals  BP (!) 115/92   Pulse 84   Temp 98 °F (36.7 °C)   Resp 18   Ht 5' 5\" (1.651 m)   Wt 81.6 kg (179 lb 14.4 oz)   SpO2 99%   Breastfeeding? No   BMI 29.94 kg/m²       O2 Device: Room air       Temp (24hrs), Av.4 °F (36.9 °C), Min:98 °F (36.7 °C), Max:99.3 °F (37.4 °C)         PHYSICAL EXAM:    NAD  No edema       DATA REVIEW:     INTAKE / OUTPUT:   Last shift:      No intake/output data recorded. Last 3 shifts: No intake/output data recorded. No intake or output data in the 24 hours ending 19 1309      LABS:   Recent Labs     19  0735   WBC 12.3*   HGB 9.6*   HCT 30.9*        Recent Labs     19  0735 19  0506   * 136   K 4.9 4.0   CL 96* 99   CO2 26 30   GLU 88 107*   BUN 77* 33*   CREA 10.70* 6.81*   CA 9.1 9.0           Assessment:     ESRD: TTS [facility]  HTN: Uncontrolled-> often related to interdialytic fluid gains  Partial SBO: 2 to constipation /maybe adhesions. Clinically improving  Anemia    Hematemesis  SLE      Plan:     Hd today.     Started retacrit

## 2019-08-13 NOTE — PROGRESS NOTES
Bedside shift change report given to DANITZA Aguilar (oncoming nurse) by DANITZA Corcoran (offgoing nurse). Report included the following information SBAR and Kardex.

## 2019-08-14 VITALS
DIASTOLIC BLOOD PRESSURE: 109 MMHG | BODY MASS INDEX: 29.52 KG/M2 | SYSTOLIC BLOOD PRESSURE: 147 MMHG | TEMPERATURE: 98.6 F | HEIGHT: 65 IN | OXYGEN SATURATION: 98 % | HEART RATE: 103 BPM | WEIGHT: 177.2 LBS | RESPIRATION RATE: 18 BRPM

## 2019-08-14 PROBLEM — K56.609 SBO (SMALL BOWEL OBSTRUCTION) (HCC): Status: RESOLVED | Noted: 2019-07-31 | Resolved: 2019-08-14

## 2019-08-14 PROBLEM — R11.15 EMESIS, PERSISTENT: Status: RESOLVED | Noted: 2019-08-11 | Resolved: 2019-08-14

## 2019-08-14 LAB
ANION GAP SERPL CALC-SCNC: 9 MMOL/L (ref 5–15)
BUN SERPL-MCNC: 33 MG/DL (ref 6–20)
BUN/CREAT SERPL: 7 (ref 12–20)
CALCIUM SERPL-MCNC: 8.9 MG/DL (ref 8.5–10.1)
CHLORIDE SERPL-SCNC: 102 MMOL/L (ref 97–108)
CO2 SERPL-SCNC: 25 MMOL/L (ref 21–32)
CREAT SERPL-MCNC: 4.9 MG/DL (ref 0.55–1.02)
GLUCOSE SERPL-MCNC: 103 MG/DL (ref 65–100)
POTASSIUM SERPL-SCNC: 3.4 MMOL/L (ref 3.5–5.1)
SODIUM SERPL-SCNC: 136 MMOL/L (ref 136–145)

## 2019-08-14 PROCEDURE — 74011250637 HC RX REV CODE- 250/637: Performed by: NURSE PRACTITIONER

## 2019-08-14 PROCEDURE — 74011250637 HC RX REV CODE- 250/637: Performed by: INTERNAL MEDICINE

## 2019-08-14 PROCEDURE — 74011000250 HC RX REV CODE- 250: Performed by: NURSE PRACTITIONER

## 2019-08-14 PROCEDURE — 90935 HEMODIALYSIS ONE EVALUATION: CPT

## 2019-08-14 PROCEDURE — 74011636637 HC RX REV CODE- 636/637: Performed by: INTERNAL MEDICINE

## 2019-08-14 PROCEDURE — 74011250636 HC RX REV CODE- 250/636: Performed by: INTERNAL MEDICINE

## 2019-08-14 PROCEDURE — 80048 BASIC METABOLIC PNL TOTAL CA: CPT

## 2019-08-14 PROCEDURE — C9113 INJ PANTOPRAZOLE SODIUM, VIA: HCPCS | Performed by: INTERNAL MEDICINE

## 2019-08-14 PROCEDURE — 74011000250 HC RX REV CODE- 250: Performed by: INTERNAL MEDICINE

## 2019-08-14 PROCEDURE — 36415 COLL VENOUS BLD VENIPUNCTURE: CPT

## 2019-08-14 PROCEDURE — 74011250637 HC RX REV CODE- 250/637: Performed by: HOSPITALIST

## 2019-08-14 RX ORDER — PANTOPRAZOLE SODIUM 40 MG/1
40 TABLET, DELAYED RELEASE ORAL
Status: DISCONTINUED | OUTPATIENT
Start: 2019-08-14 | End: 2019-08-14 | Stop reason: HOSPADM

## 2019-08-14 RX ORDER — PANTOPRAZOLE SODIUM 40 MG/1
40 TABLET, DELAYED RELEASE ORAL
Qty: 60 TAB | Refills: 0 | Status: SHIPPED | OUTPATIENT
Start: 2019-08-14 | End: 2022-05-08

## 2019-08-14 RX ORDER — DICYCLOMINE HYDROCHLORIDE 20 MG/1
20 TABLET ORAL EVERY 6 HOURS
Qty: 20 TAB | Refills: 0 | Status: SHIPPED | OUTPATIENT
Start: 2019-08-14 | End: 2019-08-19

## 2019-08-14 RX ADMIN — CARVEDILOL 25 MG: 12.5 TABLET, FILM COATED ORAL at 05:24

## 2019-08-14 RX ADMIN — GEMFIBROZIL 300 MG: 600 TABLET ORAL at 10:23

## 2019-08-14 RX ADMIN — FERRIC CITRATE 420 MG: 210 TABLET, COATED ORAL at 12:11

## 2019-08-14 RX ADMIN — PREDNISONE 10 MG: 5 TABLET ORAL at 10:24

## 2019-08-14 RX ADMIN — DICYCLOMINE HYDROCHLORIDE 20 MG: 20 TABLET ORAL at 10:23

## 2019-08-14 RX ADMIN — LOSARTAN POTASSIUM 100 MG: 50 TABLET ORAL at 10:23

## 2019-08-14 RX ADMIN — AMLODIPINE BESYLATE 10 MG: 5 TABLET ORAL at 10:23

## 2019-08-14 RX ADMIN — EPOETIN ALFA-EPBX 8000 UNITS: 4000 INJECTION, SOLUTION INTRAVENOUS; SUBCUTANEOUS at 05:29

## 2019-08-14 RX ADMIN — SODIUM CHLORIDE 40 MG: 9 INJECTION INTRAMUSCULAR; INTRAVENOUS; SUBCUTANEOUS at 10:27

## 2019-08-14 RX ADMIN — AZATHIOPRINE 50 MG: 50 TABLET ORAL at 10:25

## 2019-08-14 RX ADMIN — FERRIC CITRATE 420 MG: 210 TABLET, COATED ORAL at 07:44

## 2019-08-14 RX ADMIN — ALUMINUM HYDROXIDE, MAGNESIUM HYDROXIDE, AND SIMETHICONE 30 ML: 200; 200; 20 SUSPENSION ORAL at 05:38

## 2019-08-14 RX ADMIN — SENNOSIDES, DOCUSATE SODIUM 2 TABLET: 50; 8.6 TABLET, FILM COATED ORAL at 10:23

## 2019-08-14 RX ADMIN — DICYCLOMINE HYDROCHLORIDE 20 MG: 20 TABLET ORAL at 03:32

## 2019-08-14 RX ADMIN — OXYCODONE HYDROCHLORIDE AND ACETAMINOPHEN 1 TABLET: 5; 325 TABLET ORAL at 05:23

## 2019-08-14 RX ADMIN — OXYCODONE HYDROCHLORIDE AND ACETAMINOPHEN 1 TABLET: 5; 325 TABLET ORAL at 12:11

## 2019-08-14 RX ADMIN — MINOXIDIL 2.5 MG: 2.5 TABLET ORAL at 10:23

## 2019-08-14 RX ADMIN — SODIUM CHLORIDE 10 ML: 9 INJECTION INTRAMUSCULAR; INTRAVENOUS; SUBCUTANEOUS at 05:32

## 2019-08-14 RX ADMIN — HYDROXYCHLOROQUINE SULFATE 400 MG: 200 TABLET, FILM COATED ORAL at 07:44

## 2019-08-14 RX ADMIN — Medication 10 ML: at 10:27

## 2019-08-14 RX ADMIN — SODIUM CHLORIDE 10 ML: 9 INJECTION INTRAMUSCULAR; INTRAVENOUS; SUBCUTANEOUS at 01:35

## 2019-08-14 NOTE — DIALYSIS
[facility] Dialysis Team [facility]  [phone]    Vitals   Pre   Post   Assessment   Pre   Post     Temp  98.2F  98.0F LOC  A&O x 4, no deficits  No change   HR   86 104   Lungs   Clear/ on room air/ nonlabored  No change   B/P  177/124 169/100 Cardiac   No monitoring, WNL/ regular  No change   Resp   14 14 Skin   Warm/dry/ intact  No change   Pain level  0/10 7/10 ; primary RN notified Edema  None None   Orders:    Duration:   Start:   0200 End:   0420 Total:   2 hrs 20 mins (3 hrs)   Dialyzer:   Dialyzer/Set Up Inspection: Bandar Page (08/14/19 0150)   K Bath:   Dialysate K (mEq/L): 2 (08/14/19 0200)   Ca Bath:   Dialysate CA (mEq/L): 2.5 (08/14/19 0200)   Na/Bicarb:   Dialysate NA (mEq/L): 140 (08/14/19 0200)/ Bicarb 35   Target Fluid Removal:   Goal/Amount of Fluid to Remove (mL): 2000 mL (08/14/19 0150)   Access     Type & Location:   PETER AVG +bruit/+thrill, prepped per P&P and cannulated x 2 15 G needles without issue- +asp/+flush x 2. At end all blood returned with 300 mls NS. Removed x 2 needles and pressure held until no bleeding visible- dressing dry/intact, +bruit/+thrill present. Labs     Obtained/Reviewed   Critical Results Called   Date when labs were drawn-  Hgb-    HGB   Date Value Ref Range Status   08/13/2019 9.6 (L) 11.5 - 16.0 g/dL Final     K-    Potassium   Date Value Ref Range Status   08/13/2019 4.9 3.5 - 5.1 mmol/L Final     Ca-   Calcium   Date Value Ref Range Status   08/13/2019 9.1 8.5 - 10.1 MG/DL Final     Bun-   BUN   Date Value Ref Range Status   08/13/2019 77 (H) 6 - 20 MG/DL Final     Comment:     INVESTIGATED PER DELTA CHECK PROTOCOL     Creat-   Creatinine   Date Value Ref Range Status   08/13/2019 10.70 (H) 0.55 - 1.02 MG/DL Final     Comment:     INVESTIGATED PER DELTA CHECK PROTOCOL        Medications/ Blood Products Given  :   None: Retacrit dose scheduled for 6 am / spoke with RN who will give am dose after tx.    Blood Volume Processed (BVP):    56 Net Fluid Removed:  1,420 ml   Comments   Time Out Done: Yes 8/14/19 0150  Primary Nurse Rpt Pre: Virginia Reyes RN  Primary Nurse Rpt Post: Virginia Reyes RN  Pt Education: Given- procedural, site care, plan of care  Care Plan: See Nephrology notes TTS schedule; plan for DC 8/14/19  Tx Summary:  0200- Transported to suite via primary RN and tech, pre report given and education given. RUE AVG +bruit/+thrill, site prepped per policy and cannulated x 2 15G needles without issue- HD started without issue. 0230- Pt having some belly pain, retrieved ginger ale per request. Sitting in bed quietly at this time, no apparent distress  0300- Pt repositioned in bed and watching tv, no distress/ some belly discomfort reports nothing helps/ not new discomfort. VSS.  0330- VSS, sitting up in bed/ appears uncomfortable and restless, continues to have belly discomfort- primary RN notified pain  6/10, RN at bedside to administer Bentyl dose, will reassess for further measures to help with discomfort. 0400- VSS, still having terrible belly pain- called for alternative pain med options to primary RN. Pt wanting to end tx early gave her education and she is still running and understands. 0420-Pt wants to stop tx, gave education but continues to request being taken off. Stopped tx 40 mins early, UF goal not met- removed total 1,420 ml. Tolerated tx fair, at end all blood returned with 300 mls NS. Removed needles and pressure held until no bleeding visualized, dressing dry/intact. VSS, reported 7/10 pain, notified RN/ no apparent distress at this time. Post report given to primary RN in suite at bedside, labs drawn for RN and pt transported back to room 512 by RN and tech - plan for dc today 8/14/19.      Admiting Diagnosis:ESRD  Consent signed - Informed Consent Verified: Yes (08/14/19 0150)  [facility] Consent - Yes verified 8/14/19 0150  Hepatitis Status- Immune 7/2/19 ; HB ag negative 7/2/19  Machine #- Machine Number: [facility] [id number] (08/14/19 0150)  Telemetry status- none  Pre-dialysis wt.- 81.6kg

## 2019-08-14 NOTE — PROGRESS NOTES
Pt called for pain medication. Complains of level 7 abd pain, cramping in nature. Too early for percocet to be given at the time. Pt then requesting the Morphine that was mentioned by the night shift nurse several times during am bedside report. instructed pt that we would need to contact MD before resuming IV meds. Spoke to Dr. Joanie Kim, [address] does not want IV pain meds restarted. Refusing lactulose but states has not had a bm since last Wednesday.

## 2019-08-14 NOTE — PROGRESS NOTES
8/14/19 -   SURJIT:  - Had HD this morning, 8/14  - Patient reports ABD pain with eating; surgery believes it is chronic  - No BM yet  - Elevated BPs  - Patient has been cleared for discharge by surgery  CRM: Yvonne Najera, MPH, [address] [place] [address]; Z: [phone]

## 2019-08-14 NOTE — PROGRESS NOTES
RENAL  PROGRESS NOTE        Subjective:     \"sometimes my stomach hurts. \"  No dyspnea. No n/V. Objective:   VITALS SIGNS:    Visit Vitals  BP (!) 147/109   Pulse (!) 103   Temp 98.6 °F (37 °C)   Resp 18   Ht 5' 5\" (1.651 m)   Wt 80.4 kg (177 lb 3.2 oz)   SpO2 98%   Breastfeeding? No   BMI 29.49 kg/m²       O2 Device: Room air       Temp (24hrs), Av.2 °F (36.8 °C), Min:98 °F (36.7 °C), Max:98.6 °F (37 °C)         PHYSICAL EXAM:    NAD  No edema       DATA REVIEW:     INTAKE / OUTPUT:   Last shift:      No intake/output data recorded. Last 3 shifts:  1901 -  0700  In: 240 [P.O.:240]  Out: 1420     Intake/Output Summary (Last 24 hours) at 2019 1121  Last data filed at 2019 0420  Gross per 24 hour   Intake --   Output 1420 ml   Net -1420 ml         LABS:   Recent Labs     19  0735   WBC 12.3*   HGB 9.6*   HCT 30.9*        Recent Labs     19  0506 19  0735    132*   K 3.4* 4.9    96*   CO2 25 26   * 88   BUN 33* 77*   CREA 4.90* 10.70*   CA 8.9 9.1   PHOS  --  6.6*           Assessment:     ESRD: TTS [facility]  HTN: Uncontrolled-> often related to interdialytic fluid gains  Partial SBO: 2 to constipation /maybe adhesions. Clinically improving  Anemia    Hematemesis  SLE      Plan:     Had HD early this AM.  Potassium low but that was due to HD. Replete labs in AM if she is still here.     Started retacrit

## 2019-08-14 NOTE — PROGRESS NOTES
Written and verbal instructions provided for pt along with two prescriptions. Written and verbal instructions also given with each prescription. No questions or concerns voiced. Pt has all personal belongings at time of discharge.

## 2019-08-14 NOTE — DISCHARGE INSTRUCTIONS
Discharge Instructions       PATIENT ID: Steven Harper  MRN: [id number]   YOB: 1985    DATE OF ADMISSION: 7/31/2019  1:10 PM    DATE OF DISCHARGE: 8/14/2019    PRIMARY CARE PROVIDER: Shira Humphries NP     ATTENDING PHYSICIAN: Scar Braden MD  DISCHARGING PROVIDER: John Maria MD    To contact this individual call [phone] and ask the  to page. If unavailable ask to be transferred the Adult Hospitalist Department. DISCHARGE DIAGNOSES   Partial SBO due to probable constipation and adhesions. Resolved  ESRD on HD    CONSULTATIONS: IP CONSULT TO GENERAL SURGERY  IP CONSULT TO NEPHROLOGY  IP CONSULT TO GASTROENTEROLOGY    PROCEDURES/SURGERIES: Procedure(s):  ESOPHAGOGASTRODUODENOSCOPY (EGD)    PENDING TEST RESULTS:   At the time of discharge the following test results are still pending: n/a    FOLLOW UP APPOINTMENTS:   Follow-up Information     Follow up With Specialties Details Why Contact Info    Shira Humphries NP Nurse Practitioner Schedule an appointment as soon as possible for a visit in 1 week as recommended, for follow Up, for post-hospitalization follow up, with in 7 days [address]  [phone]      Cici Hay MD General Surgery Schedule an appointment as soon as possible for a visit as recommended [address] [facility]  [phone]      Romie Abraham MD Nephrology Schedule an appointment as soon as possible for a visit as recommended [address] [facility] [address] [phone]             ADDITIONAL CARE RECOMMENDATIONS:   · It is important that you take the medication exactly as they are prescribed. · Keep your medication in the bottles provided by the pharmacist and keep a list of the medication names, dosages, and times to be taken in your wallet. · Do not take other medications without consulting your doctor.    · No drinking alcohol or driving car or operating machinery if you are on narcotic pain medications. Donot take sedating mediations if you are sleepy or confused. · Fall Precautions  · Keep Well Hydrated  · Report to your medical provider if you feel you have  developed allergies to medications  · Follow up with your PCP or Consultant for medication adjustments and refills  · Monitor for signs of fevers,chills,bleeding,chest pain and seek medical attention if you do so. · Tell your doctor all the prescription, herbal, or over-the-counter medicines you take. Do not take any new ones unless you talk to your doctor first.  · Do not take anti-inflammatory medicines. These include ibuprofen (Advil, Motrin) and naproxen (Aleve). You can use acetaminophen (Tylenol) for pain. · Do not take two or more pain medicines at the same time unless the doctor told you to. Many pain medicines have acetaminophen, which is Tylenol. Too much acetaminophen (Tylenol) can be harmful. · Tell all doctors and others who work with your health care that you have kidney disease. · Wear medical alert jewelry that lists your health problem. You can buy this at most [employer]. DIET: Renal Diet    ACTIVITY: Ambulate in house    WOUND CARE: n/a    EQUIPMENT needed: n/a      DISCHARGE MEDICATIONS:   See Medication Reconciliation Form    · It is important that you take the medication exactly as they are prescribed. · Keep your medication in the bottles provided by the pharmacist and keep a list of the medication names, dosages, and times to be taken in your wallet. · Do not take other medications without consulting your doctor. NOTIFY YOUR PHYSICIAN FOR ANY OF THE FOLLOWING:   Fever over 101 degrees for 24 hours. Chest pain, shortness of breath, fever, chills, nausea, vomiting, diarrhea, change in mentation, falling, weakness, bleeding. Severe pain or pain not relieved by medications.   Or, any other signs or symptoms that you may have questions about.      DISPOSITION:   x Home With:   OT  PT  HH  RN       SNF/Inpatient Rehab/LTAC    Independent/assisted living    Hospice    Other:       Information obtained by :   I understand that if any problems occur once I am at home I am to contact my physician. I understand and acknowledge receipt of the instructions indicated above. Physician's or R.N.'s Signature                                                                  Date/Time                                                                                                                                              Patient or Representative Signature                                                          Date/Time          Signed:   Pauly Still MD  2019  12:39 PM       Provenance Activation    Thank you for requesting access to [employer]. Please follow the instructions below to securely access and download your online medical record. Provenance allows you to send messages to your doctor, view your test results, renew your prescriptions, schedule appointments, and more. How Do I Sign Up? 1. In your internet browser, go to [email or web address]. 2. Click on the First Time User? Click Here link in the Sign In box. You will see the New Member Sign Up page. 3. Enter your Provenance Access Code exactly as it appears below. You will not need to use this code after youve completed the sign-up process. If you do not sign up before the expiration date, you must request a new code. Provenance Access Code: Activation code not generated  Current Provenance Status: Active (This is the date your Provenance access code will )    4. Enter the last four digits of your Social Security Number (xxxx) and Date of Birth (mm/dd/yyyy) as indicated and click Submit.  You will be taken to the next sign-up page.  5. Create a Pwintyt ID. This will be your PhotoThera login ID and cannot be changed, so think of one that is secure and easy to remember. 6. Create a PhotoThera password. You can change your password at any time. 7. Enter your Password Reset Question and Answer. This can be used at a later time if you forget your password. 8. Enter your e-mail address. You will receive e-mail notification when new information is available in [address]. 9. Click Sign Up. You can now view and download portions of your medical record. 10. Click the Download Summary menu link to download a portable copy of your medical information. Additional Information    If you have questions, please visit the Frequently Asked Questions section of the PhotoThera website at [email or web address] Remember, PhotoThera is NOT to be used for urgent needs. For medical emergencies, dial 911.

## 2019-08-14 NOTE — PROGRESS NOTES
Progress Note    Patient: Genaro Aburto MRN: [id number]  SSN: [id number]    YOB: 1985  Age: 36 y.o. Sex: female      Admit Date: 2019    12 Days Post-Op    Procedure:  Procedure(s):  ESOPHAGOGASTRODUODENOSCOPY (EGD)    Subjective:     No acute surgical issues. Pt reported abdominal pain returning when she tried to eat. No nausea or vomiting. Objective:     Visit Vitals  BP (!) 147/109   Pulse (!) 103   Temp 98.6 °F (37 °C)   Resp 18   Ht 5' 5\" (1.651 m)   Wt 177 lb 3.2 oz (80.4 kg)   SpO2 98%   Breastfeeding? No   BMI 29.49 kg/m²       Temp (24hrs), Av.2 °F (36.8 °C), Min:98 °F (36.7 °C), Max:98.6 °F (37 °C)      Physical Exam:    Gen:  NAD  Pulm:  Unlabored  Abd:  S/ND/obese/mild TTP without guarding or rebound      Recent Results (from the past 24 hour(s))   METABOLIC PANEL, BASIC    Collection Time: 19  5:06 AM   Result Value Ref Range    Sodium 136 136 - 145 mmol/L    Potassium 3.4 (L) 3.5 - 5.1 mmol/L    Chloride 102 97 - 108 mmol/L    CO2 25 21 - 32 mmol/L    Anion gap 9 5 - 15 mmol/L    Glucose 103 (H) 65 - 100 mg/dL    BUN 33 (H) 6 - 20 MG/DL    Creatinine 4.90 (H) 0.55 - 1.02 MG/DL    BUN/Creatinine ratio 7 (L) 12 - 20      GFR est AA 12 (L) >60 ml/min/1.73m2    GFR est non-AA 10 (L) >60 ml/min/1.73m2    Calcium 8.9 8.5 - 10.1 MG/DL       Assessment:     Hospital Problems  Date Reviewed: 2019          Codes Class Noted POA    Emesis, persistent ICD-10-CM: R11.10  ICD-9-CM: 536.2  2019 Unknown        * (Principal) SBO (small bowel obstruction) ([facility] ICD-10-CM: H89.362  ICD-9-CM: 560.9  2019 Unknown              Plan/Recommendations/Medical Decision Making:     - Abdominal pain : Ileus versus obstruction has resolved. Abdominal may be chronic in nature. No surgical intervention is required  - Diet as tolerated  - May be discharged from Gen Surgery standpoint if able to tolerate diet and stay hydrated.

## 2019-08-14 NOTE — PROGRESS NOTES
Clinical Pharmacy Note: IV to PO Automatic Conversion  Please note: Fabrizio Hodgkin medication pantoprazole has been changed from IV to PO based on the following critiera:    Patient is taking scheduled oral medications  Patient is tolerating tube feeds at goal rate or a full liquid, soft or regular diet    This IV to PO conversion is based on the [employer] approved automatic conversion policy for eligible patients. Please call with questions.     [place], [place]

## 2019-08-14 NOTE — PROGRESS NOTES
Bedside shift change report given to Luis Martínez RN (oncoming nurse) by Zenobia Archuleta RN (offgoing nurse). Report included the following information SBAR, Kardex, Intake/Output and MAR.

## 2019-08-14 NOTE — DISCHARGE SUMMARY
Inpatient hospitalist discharge summary                Brief Overview    PATIENT ID: Richard Segovia    MRN: [id number]     YOB: 1985    Admitting Provider: Meliton Yates MD    Discharging Provider: Jose Goldberg MD   To contact this individual call [phone] and ask the  to page. If unavailable ask to be transferred the Adult Hospitalist Department.       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    Encounter for monitoring opioid maintenance therapy Z51.81, Z79.891    Malignant hypertension I10    Anemia of renal disease N18.9, D63.1    Dependence on peritoneal dialysis ([facility] [address] Z99.2    ACP (advance care planning) Z71.89    Chronic bilateral low back pain without sciatica M54.5, G89.29    Hypertension I10    Hypokalemia E87.6    Hypomagnesemia E83.42    Hypocalcemia E83.51    History of pulmonary embolism Z86.711    Peritonitis (HCC) K65.9    Generalized abdominal pain R10.84    Other constipation K59.09    Other ascites R18.8    Sepsis (HCC) A41.9    Intra-abdominal abscess (HCC) K65.1    Troponin level elevated R74.8    Rib pain on right side R07.81    SOB (shortness of breath) R06.02    ESRD on hemodialysis (HCC) N18.6, Z99.2    Hyperkalemia E87.5    Vaginal bleeding N93.9    Anemia due to blood loss D50.0    HCAP (healthcare-associated pneumonia) J18.9         Discharge diagnosis, hospital course/plan:  Resolved Partial SBO due to probable constipation and adhesions.  -Clinically improved, cleared for DC by Gen Surg     Hematemesis  -Hb stable  -EGD fairly unremarkable  -PPI bid  -GI eval noted and appreciated.     Accelerated hypertension. Uncontrolled Hypertension. -BP improved with dialysis. Continue amlodipine,carvedilol,losartan and minoxidil      SLE. No acute flare. continue azathioprine,hydroxychloroquine and prednisone.     ESRD on HD  -access: right arm AVF  -Schedule TTS  -Nephrology F/U noted and appreciated.     Anemia of chronicity.  -Hb stable.     -Electrolytes: Probable multifactorial Hyponatremia.       On the date of discharge, diagnostic face to face encounter was performed. Patient was hemodynamically stable, offering no new complaints. Denies any shortness of breath at rest, no fevers or chills, no diarrhea or constipation. Patient is agreeable for discharge. Patient understood and verbalized the understanding of the discharge plan. Patient was advised to seek medical help/ care or return to ED, if symptoms recur, worsen or new symptoms develop.       Discharge Disposition:  Home or Self Care    Discharge activity:  Ambulate in house    Code status at discharge:  Full Code     Active issues requiring follow up:  SBO  ESRD    Outpatient follow up:    Future appointments-  Future Appointments   Date Time Provider Rocio Hayley   8/19/2019  2:20 PM Mariajose Moreira MD [place] [address]     Follow-up Information     Follow up With Specialties Details Why Contact Mary Reed NP Nurse Practitioner Schedule an appointment as soon as possible for a visit in 1 week as recommended, for follow Up, for post-hospitalization follow up, with in 7 days [address]  [phone]      Shiela Carranza MD General Surgery Schedule an appointment as soon as possible for a visit as recommended [address] [place] [address] [address] [facility]      Bud Silva MD Nephrology Schedule an appointment as soon as possible for a visit as recommended [address] [facility] [address] [phone]            Test results pending upon discharge:  n/a    Operative procedures performed:  Procedure(s):  ESOPHAGOGASTRODUODENOSCOPY (EGD)    Treatments: IV hydration    Consults:  IP CONSULT TO GENERAL SURGERY  IP CONSULT TO NEPHROLOGY  IP CONSULT TO GASTROENTEROLOGY    Procedures:    Procedure(s):  ESOPHAGOGASTRODUODENOSCOPY (EGD)    Diet:  DIET ONE TIME MESSAGE  DIET ONE TIME MESSAGE  DIET NUTRITIONAL SUPPLEMENTS  DIET GI LITE (POST SURGICAL)    Pertinent test results:  Xr Chest Sngl V    Result Date: 7/15/2019  EXAM: Portable CXR. 1755 hours. INDICATION: Dyspnea COMPARISON: 6/29/2019. FINDINGS: There is new pulmonary edema. There are new small pleural effusions. Heart size remains normal. There is no pneumothorax or midline shift. IMPRESSION: Pulmonary edema and small pleural effusions    Xr Chest Pa Lat    Result Date: 7/30/2019  INDICATION: . shortness of breath Additional history: COMPARISON: Previous chest xray, 7/15/2019 and 6/29/2019. Vaughn Muck FINDINGS: PA and lateral view of the chest. . Lines/tubes/surgical: None. Heart/mediastinum: Unremarkable. Lungs/pleura: Improved aeration with no residual visible focal consolidation. No visualized pleural effusion or pneumothorax. Thickening in the right major fissure. Additional Comments: None. Vaughn Muck IMPRESSION: 1. Improved aeration. 2. Thickening in the right major fissure suggesting a small amount of pleural fluid. Xr Abd (kub)    Result Date: 8/9/2019  PROCEDURE: XR ABD (KUB) REASON FOR STUDY: normal axr still having intermittent vomiting/ COMPARISON: 8/7/2019 FINDINGS: The patient was initially scheduled for a small bowel follow-through however she was unable to drink the thin barium. A single frontal view the abdomen demonstrates diffuse gaseous distention of small bowel without high-grade bowel obstruction. There is stool in the colon with air. An IUD is noted in the pelvis. IMPRESSION:  Unable to complete the small bowel follow-through.  Diffuse gaseous distention of the small bowel without without evidence of obstruction     Xr Abd (kub)    Result Date: 8/3/2019  EXAM: XR ABD (KUB) INDICATION: Increased abdominal pain today. Hospitalization for small bowel obstruction. End-stage renal disease on dialysis. COMPARISON: CT abdomen/pelvis on 7/31/2019. Abdomen views on 8/2/2019. Vaughn Muck TECHNIQUE: AP portable supine abdomen view FINDINGS: Small bowel loop dilatation is increased and now measures 3.8 cm. Moderate gastric distention is slightly increased. Gas and stool are visible in the nondistended colon. Lung bases are clear. No evidence of pneumoperitoneum on this supine view. Bones are unchanged. IMPRESSION: Increased small bowel and gastric distention in the setting of small bowel obstruction. Consider enteric tube placement. Xr Abd Flat/ Erect    Result Date: 8/7/2019  EXAM:  XR ABD FLAT/ ERECT. INDICATION:  Obstruction. COMPARISON:  8/5/2019. FINDINGS: Supine and upright views of the abdomen demonstrate a nonspecific bowel gas pattern. There is scattered small bowel gas and gas and stool in the colon with a few small air-fluid levels on the upright view. There is no free intraperitoneal air. No soft tissue masses or pathologic calcifications are seen. The bones and soft tissues are within normal limits. There is an intrauterine device in the pelvis. IMPRESSION: Nonspecific bowel gas pattern. No definite obstruction. Xr Abd Flat/ Erect    Result Date: 8/5/2019  EXAM: XR ABD FLAT/ ERECT INDICATION: SBO, resolving? COMPARISON: 8/3/2019. FINDINGS: Supine and upright views of the abdomen demonstrate stool in the right colon. The small bowel loops are distended to about 4 cm but still have dynamic air-fluid levels. There is no free intraperitoneal air. No soft tissue masses or pathologic calcifications are seen. The bones and soft tissues are within normal limits. An IUD is in place. IMPRESSION: Moderate amount of stool in the right colon. Persistent small bowel obstruction.      Xr Abd Flat/ Erect    Result Date: 8/2/2019  EXAMINATION: Abdomen 2 views. INDICATION: Evaluate for small bowel obstruction. COMPARISON: CT abdomen 7/31/2019. Supine and upright views of the abdomen show persistent small bowel mild dilation with air-fluid levels, nonspecific. There remains moderate gas and stool distention of the colon without rectal impaction. There is no pneumoperitoneum. IUD is in place. IMPRESSION: Nonspecific bowel gas pattern, without significant change. Xr Abd Flat/ Erect    Result Date: 7/30/2019  EXAM:  XR ABD FLAT/ ERECT INDICATION:  abdominal pain, vomiting. Additional history: COMPARISON: X-ray of the abdomen, 10/21/2015. Gris Brar FINDINGS: Supine and upright views of the abdomen demonstrate a paucity of small bowel gas with gas and stool in the large bowel. . No visible pneumoperitoneum. The bones and soft tissues are within normal limits. There is an IUD in the pelvis. .    IMPRESSION: 1. Moderate to large stool burden without definite acute process     Cta Chest W Or W Wo Cont    Result Date: 7/15/2019  INDICATION:  Dialysis tonight/tomorrow  eval for PE EXAM: CT Angio Chest: TECHNIQUE: Unenhanced localizing CT imaging of the pulmonary arteries is followed by bolus injection of 100 mL Isovue 370 contrast IV, with thin section axial Chest CT obtained and 3D image post processing performed including coronal MIPS. CT dose reduction was achieved through use of a standardized protocol tailored for this examination and automatic exposure control for dose modulation. There is respiratory motion artifact. FINDINGS: There is no pulmonary embolism. There is no aortic dissection or aneurysm. Lungs show dense consolidation of the right upper lobe which may represent pneumonia. There is also bilateral patchy airspace disease likely representing pulmonary edema. There is no pneumothorax. There is no pleural effusion or significant pericardial fluid. There is no significant adenopathy.  Visualized thyroid and lower neck soft tissues are unremarkable for age. IMPRESSION: 1. No pulmonary emboli. 2. RUL consolidation with pneumonia difficult to exclude. 3. Bilateral airspace disease compatible with pulmonary edema. Ct Abd Pelv Wo Cont    Result Date: 7/31/2019  EXAM: CT ABD PELV WO CONT INDICATION: Abdominal pain; obstruction? COMPARISON: 7/27/2019 CONTRAST:  None. TECHNIQUE: Thin axial images were obtained through the abdomen and pelvis. Coronal and sagittal reconstructions were generated. Oral contrast was not administered. CT dose reduction was achieved through use of a standardized protocol tailored for this examination and automatic exposure control for dose modulation. The absence of intravenous contrast material reduces the sensitivity for evaluation of the solid parenchymal organs of the abdomen. FINDINGS: LUNG BASES: Clear. INCIDENTALLY IMAGED HEART AND MEDIASTINUM: Small hiatal hernia. LIVER: No mass or biliary dilatation. GALLBLADDER: Moderately distended. No definite gallbladder wall thickening or pericholecystic fluid. SPLEEN: No mass. PANCREAS: No mass or ductal dilatation. ADRENALS: Unremarkable. KIDNEYS/URETERS: Small atrophic bilateral kidneys. No evidence of hydronephrosis. STOMACH: Unremarkable. SMALL BOWEL: Multiple borderline dilated loops of small bowel with air-fluid levels throughout. There are multiple nondistended loops of small bowel matted together in the right lower quadrant. COLON: Moderate stool burden. No evidence of obstruction. APPENDIX: Unremarkable. PERITONEUM: No ascites or pneumoperitoneum. RETROPERITONEUM: No lymphadenopathy or aortic aneurysm. REPRODUCTIVE ORGANS: IUD in place. Hypodensity in the left ovary possibly representing a physiologic cyst. Probable nabothian cysts in the cervix. URINARY BLADDER: No mass or calculus. BONES: No destructive bone lesion.  ADDITIONAL COMMENTS: N/A     IMPRESSION: 1.  Multiple loops of borderline dilated small bowel with air-fluid levels throughout the abdomen. There is a cluster of nondistended loops of bowel in the right lower quadrant which are matted together. These findings may represent adhesive disease. This may represent an early partial obstruction or ileus. There is no evidence of high-grade obstruction. 2.  Small atrophic bilateral kidneys. Ct Abd Pelv Wo Cont    Result Date: 7/27/2019  INDICATION: upper abdominal pain. labs normal. unrelieved with pain meds in ED> EXAM: CT Abdomen and Pelvis without IV contrast. No oral contrast. CT dose reduction was achieved through use of a standardized protocol tailored for this examination and automatic exposure control for dose modulation. FINDINGS: Kidneys are atrophic without stone, mass or hydroureteronephrosis. There is no perirenal fluid or ascites. Liver shows no apparent significant finding without contrast. Pancreas, adrenal glands, spleen and aorta show no significant enlargement. No inflammation is seen. There is no pneumoperitoneum or significant adenopathy. The bladder is not distended. The distal ureters are not dilated. There is no apparent pelvic mass. The appendix is normal. There is moderate diffuse stool without rectal impaction. IUD is in the uterus. There are small fibroids. IMPRESSION: No Acute Disease. Recent Results (from the past 336 hour(s))   SAMPLES BEING HELD    Collection Time: 07/31/19  1:38 PM   Result Value Ref Range    SAMPLES BEING HELD 1 RED 1 BLUE     COMMENT        Add-on orders for these samples will be processed based on acceptable specimen integrity and analyte stability, which may vary by analyte.    TROPONIN I    Collection Time: 07/31/19  1:38 PM   Result Value Ref Range    Troponin-I, Qt. <0.05 <0.05 ng/mL   CBC WITH AUTOMATED DIFF    Collection Time: 07/31/19  1:38 PM   Result Value Ref Range    WBC 5.9 3.6 - 11.0 K/uL    RBC 4.14 3.80 - 5.20 M/uL    HGB 11.7 11.5 - 16.0 g/dL    HCT 38.9 35.0 - 47.0 %    MCV 94.0 80.0 - 99.0 FL    MCH 28.3 26.0 - 34.0 PG    MCHC 30.1 30.0 - 36.5 g/dL    RDW 14.7 (H) 11.5 - 14.5 %    PLATELET 975 075 - 679 K/uL    MPV 9.9 8.9 - 12.9 FL    NRBC 0.0 0  WBC    ABSOLUTE NRBC 0.00 0.00 - 0.01 K/uL    NEUTROPHILS 79 (H) 32 - 75 %    LYMPHOCYTES 11 (L) 12 - 49 %    MONOCYTES 9 5 - 13 %    EOSINOPHILS 0 0 - 7 %    BASOPHILS 0 0 - 1 %    IMMATURE GRANULOCYTES 1 (H) 0.0 - 0.5 %    ABS. NEUTROPHILS 4.7 1.8 - 8.0 K/UL    ABS. LYMPHOCYTES 0.6 (L) 0.8 - 3.5 K/UL    ABS. MONOCYTES 0.5 0.0 - 1.0 K/UL    ABS. EOSINOPHILS 0.0 0.0 - 0.4 K/UL    ABS. BASOPHILS 0.0 0.0 - 0.1 K/UL    ABS. IMM. GRANS. 0.1 (H) 0.00 - 0.04 K/UL    DF SMEAR SCANNED      RBC COMMENTS NORMOCYTIC, NORMOCHROMIC     METABOLIC PANEL, COMPREHENSIVE    Collection Time: 07/31/19  1:38 PM   Result Value Ref Range    Sodium 136 136 - 145 mmol/L    Potassium 4.1 3.5 - 5.1 mmol/L    Chloride 92 (L) 97 - 108 mmol/L    CO2 28 21 - 32 mmol/L    Anion gap 16 (H) 5 - 15 mmol/L    Glucose 137 (H) 65 - 100 mg/dL    BUN 36 (H) 6 - 20 MG/DL    Creatinine 8.85 (H) 0.55 - 1.02 MG/DL    BUN/Creatinine ratio 4 (L) 12 - 20      GFR est AA 6 (L) >60 ml/min/1.73m2    GFR est non-AA 5 (L) >60 ml/min/1.73m2    Calcium 10.2 (H) 8.5 - 10.1 MG/DL    Bilirubin, total 0.4 0.2 - 1.0 MG/DL    ALT (SGPT) 14 12 - 78 U/L    AST (SGOT) 21 15 - 37 U/L    Alk. phosphatase 83 45 - 117 U/L    Protein, total 10.1 (H) 6.4 - 8.2 g/dL    Albumin 3.8 3.5 - 5.0 g/dL    Globulin 6.3 (H) 2.0 - 4.0 g/dL    A-G Ratio 0.6 (L) 1.1 - 2.2     HEP B SURFACE AG    Collection Time: 07/31/19  1:38 PM   Result Value Ref Range    Hepatitis B surface Ag <0.10 Index    Hep B surface Ag Interp.  NEGATIVE  NEG     EKG, 12 LEAD, INITIAL    Collection Time: 07/31/19  2:18 PM   Result Value Ref Range    Ventricular Rate 103 BPM    Atrial Rate 103 BPM    P-R Interval 168 ms    QRS Duration 100 ms    Q-T Interval 388 ms    QTC Calculation (Bezet) 508 ms    Calculated P Axis 65 degrees    Calculated R Axis 72 degrees Calculated T Axis 46 degrees    Diagnosis       Sinus tachycardia  Left ventricular hypertrophy  Left atrial enlargement  Prolonged QT  When compared with ECG of 15-JUL-2019 17:33,  QT has lengthened  Confirmed by Gregoria Gomez M.D., Valeria Palm ([id number]) on 7/31/2019 2:50:39 PM     TYPE & SCREEN    Collection Time: 08/01/19  1:57 AM   Result Value Ref Range    Crossmatch Expiration 08/04/2019     ABO/Rh(D) Renetta Bennett POSITIVE     Antibody screen NEG    METABOLIC PANEL, BASIC    Collection Time: 08/01/19  1:57 AM   Result Value Ref Range    Sodium 135 (L) 136 - 145 mmol/L    Potassium 5.0 3.5 - 5.1 mmol/L    Chloride 96 (L) 97 - 108 mmol/L    CO2 27 21 - 32 mmol/L    Anion gap 12 5 - 15 mmol/L    Glucose 99 65 - 100 mg/dL    BUN 44 (H) 6 - 20 MG/DL    Creatinine 9.22 (H) 0.55 - 1.02 MG/DL    BUN/Creatinine ratio 5 (L) 12 - 20      GFR est AA 6 (L) >60 ml/min/1.73m2    GFR est non-AA 5 (L) >60 ml/min/1.73m2    Calcium 9.2 8.5 - 10.1 MG/DL   CBC W/O DIFF    Collection Time: 08/01/19  1:57 AM   Result Value Ref Range    WBC 5.0 3.6 - 11.0 K/uL    RBC 3.45 (L) 3.80 - 5.20 M/uL    HGB 9.9 (L) 11.5 - 16.0 g/dL    HCT 32.0 (L) 35.0 - 47.0 %    MCV 92.8 80.0 - 99.0 FL    MCH 28.7 26.0 - 34.0 PG    MCHC 30.9 30.0 - 36.5 g/dL    RDW 14.5 11.5 - 14.5 %    PLATELET 991 138 - 912 K/uL    MPV 9.7 8.9 - 12.9 FL    NRBC 0.0 0  WBC    ABSOLUTE NRBC 0.00 0.00 - 0.01 K/uL   CBC W/O DIFF    Collection Time: 08/02/19  3:57 AM   Result Value Ref Range    WBC 3.6 3.6 - 11.0 K/uL    RBC 3.02 (L) 3.80 - 5.20 M/uL    HGB 8.7 (L) 11.5 - 16.0 g/dL    HCT 28.7 (L) 35.0 - 47.0 %    MCV 95.0 80.0 - 99.0 FL    MCH 28.8 26.0 - 34.0 PG    MCHC 30.3 30.0 - 36.5 g/dL    RDW 14.6 (H) 11.5 - 14.5 %    PLATELET 665 258 - 988 K/uL    MPV 10.0 8.9 - 12.9 FL    NRBC 0.0 0  WBC    ABSOLUTE NRBC 0.00 0.00 - 0.01 K/uL   MAGNESIUM    Collection Time: 08/02/19  3:57 AM   Result Value Ref Range    Magnesium 2.4 1.6 - 2.4 mg/dL   PHOSPHORUS    Collection Time: 08/02/19 3: 57 AM   Result Value Ref Range    Phosphorus 6.4 (H) 2.6 - 4.7 MG/DL   METABOLIC PANEL, COMPREHENSIVE    Collection Time: 08/02/19  3:57 AM   Result Value Ref Range    Sodium 136 136 - 145 mmol/L    Potassium 4.7 3.5 - 5.1 mmol/L    Chloride 101 97 - 108 mmol/L    CO2 24 21 - 32 mmol/L    Anion gap 11 5 - 15 mmol/L    Glucose 76 65 - 100 mg/dL    BUN 29 (H) 6 - 20 MG/DL    Creatinine 7.06 (H) 0.55 - 1.02 MG/DL    BUN/Creatinine ratio 4 (L) 12 - 20      GFR est AA 8 (L) >60 ml/min/1.73m2    GFR est non-AA 7 (L) >60 ml/min/1.73m2    Calcium 8.7 8.5 - 10.1 MG/DL    Bilirubin, total 0.3 0.2 - 1.0 MG/DL    ALT (SGPT) 10 (L) 12 - 78 U/L    AST (SGOT) 22 15 - 37 U/L    Alk.  phosphatase 61 45 - 117 U/L    Protein, total 7.6 6.4 - 8.2 g/dL    Albumin 2.9 (L) 3.5 - 5.0 g/dL    Globulin 4.7 (H) 2.0 - 4.0 g/dL    A-G Ratio 0.6 (L) 1.1 - 2.2     CBC W/O DIFF    Collection Time: 08/06/19  7:45 AM   Result Value Ref Range    WBC 5.7 3.6 - 11.0 K/uL    RBC 3.19 (L) 3.80 - 5.20 M/uL    HGB 9.0 (L) 11.5 - 16.0 g/dL    HCT 29.5 (L) 35.0 - 47.0 %    MCV 92.5 80.0 - 99.0 FL    MCH 28.2 26.0 - 34.0 PG    MCHC 30.5 30.0 - 36.5 g/dL    RDW 14.6 (H) 11.5 - 14.5 %    PLATELET 561 278 - 345 K/uL    MPV 9.8 8.9 - 12.9 FL    NRBC 0.0 0  WBC    ABSOLUTE NRBC 0.00 0.00 - 5.56 K/uL   METABOLIC PANEL, BASIC    Collection Time: 08/06/19  7:45 AM   Result Value Ref Range    Sodium 135 (L) 136 - 145 mmol/L    Potassium 4.6 3.5 - 5.1 mmol/L    Chloride 99 97 - 108 mmol/L    CO2 24 21 - 32 mmol/L    Anion gap 12 5 - 15 mmol/L    Glucose 67 65 - 100 mg/dL    BUN 63 (H) 6 - 20 MG/DL    Creatinine 11.00 (H) 0.55 - 1.02 MG/DL    BUN/Creatinine ratio 6 (L) 12 - 20      GFR est AA 5 (L) >60 ml/min/1.73m2    GFR est non-AA 4 (L) >60 ml/min/1.73m2    Calcium 8.5 8.5 - 10.1 MG/DL   PHOSPHORUS    Collection Time: 08/06/19  7:45 AM   Result Value Ref Range    Phosphorus 8.1 (H) 2.6 - 4.7 MG/DL   METABOLIC PANEL, COMPREHENSIVE    Collection Time: 08/07/19 2:40 AM   Result Value Ref Range    Sodium 133 (L) 136 - 145 mmol/L    Potassium 4.2 3.5 - 5.1 mmol/L    Chloride 100 97 - 108 mmol/L    CO2 19 (L) 21 - 32 mmol/L    Anion gap 14 5 - 15 mmol/L    Glucose 54 (L) 65 - 100 mg/dL    BUN 30 (H) 6 - 20 MG/DL    Creatinine 7.00 (H) 0.55 - 1.02 MG/DL    BUN/Creatinine ratio 4 (L) 12 - 20      GFR est AA 8 (L) >60 ml/min/1.73m2    GFR est non-AA 7 (L) >60 ml/min/1.73m2    Calcium 8.7 8.5 - 10.1 MG/DL    Bilirubin, total 0.3 0.2 - 1.0 MG/DL    ALT (SGPT) 12 12 - 78 U/L    AST (SGOT) 19 15 - 37 U/L    Alk. phosphatase 76 45 - 117 U/L    Protein, total 8.0 6.4 - 8.2 g/dL    Albumin 3.0 (L) 3.5 - 5.0 g/dL    Globulin 5.0 (H) 2.0 - 4.0 g/dL    A-G Ratio 0.6 (L) 1.1 - 2.2     METABOLIC PANEL, BASIC    Collection Time: 08/08/19  2:40 AM   Result Value Ref Range    Sodium 133 (L) 136 - 145 mmol/L    Potassium 4.1 3.5 - 5.1 mmol/L    Chloride 96 (L) 97 - 108 mmol/L    CO2 24 21 - 32 mmol/L    Anion gap 13 5 - 15 mmol/L    Glucose 135 (H) 65 - 100 mg/dL    BUN 42 (H) 6 - 20 MG/DL    Creatinine 9.03 (H) 0.55 - 1.02 MG/DL    BUN/Creatinine ratio 5 (L) 12 - 20      GFR est AA 6 (L) >60 ml/min/1.73m2    GFR est non-AA 5 (L) >60 ml/min/1.73m2    Calcium 9.2 8.5 - 10.1 MG/DL   CBC WITH AUTOMATED DIFF    Collection Time: 08/08/19  2:40 AM   Result Value Ref Range    WBC 9.2 3.6 - 11.0 K/uL    RBC 3.83 3.80 - 5.20 M/uL    HGB 10.7 (L) 11.5 - 16.0 g/dL    HCT 34.8 (L) 35.0 - 47.0 %    MCV 90.9 80.0 - 99.0 FL    MCH 27.9 26.0 - 34.0 PG    MCHC 30.7 30.0 - 36.5 g/dL    RDW 14.6 (H) 11.5 - 14.5 %    PLATELET 415 446 - 188 K/uL    MPV 9.4 8.9 - 12.9 FL    NRBC 0.0 0  WBC    ABSOLUTE NRBC 0.00 0.00 - 0.01 K/uL    NEUTROPHILS 83 (H) 32 - 75 %    LYMPHOCYTES 7 (L) 12 - 49 %    MONOCYTES 10 5 - 13 %    EOSINOPHILS 0 0 - 7 %    BASOPHILS 0 0 - 1 %    IMMATURE GRANULOCYTES 0 0.0 - 0.5 %    ABS. NEUTROPHILS 7.7 1.8 - 8.0 K/UL    ABS. LYMPHOCYTES 0.6 (L) 0.8 - 3.5 K/UL    ABS.  MONOCYTES 0.9 0.0 - 1.0 K/UL    ABS. EOSINOPHILS 0.0 0.0 - 0.4 K/UL    ABS. BASOPHILS 0.0 0.0 - 0.1 K/UL    ABS. IMM. GRANS. 0.0 0.00 - 0.04 K/UL    DF SMEAR SCANNED      PLATELET COMMENTS Large Platelets      RBC COMMENTS POLYCHROMASIA  1+        RBC COMMENTS ANISOCYTOSIS  1+       METABOLIC PANEL, BASIC    Collection Time: 08/09/19  3:58 AM   Result Value Ref Range    Sodium 135 (L) 136 - 145 mmol/L    Potassium 4.2 3.5 - 5.1 mmol/L    Chloride 97 97 - 108 mmol/L    CO2 27 21 - 32 mmol/L    Anion gap 11 5 - 15 mmol/L    Glucose 96 65 - 100 mg/dL    BUN 37 (H) 6 - 20 MG/DL    Creatinine 7.82 (H) 0.55 - 1.02 MG/DL    BUN/Creatinine ratio 5 (L) 12 - 20      GFR est AA 7 (L) >60 ml/min/1.73m2    GFR est non-AA 6 (L) >60 ml/min/1.73m2    Calcium 9.6 8.5 - 48.5 MG/DL   METABOLIC PANEL, BASIC    Collection Time: 08/10/19  8:11 AM   Result Value Ref Range    Sodium 133 (L) 136 - 145 mmol/L    Potassium 4.2 3.5 - 5.1 mmol/L    Chloride 96 (L) 97 - 108 mmol/L    CO2 26 21 - 32 mmol/L    Anion gap 11 5 - 15 mmol/L    Glucose 97 65 - 100 mg/dL    BUN 62 (H) 6 - 20 MG/DL    Creatinine 10.10 (H) 0.55 - 1.02 MG/DL    BUN/Creatinine ratio 6 (L) 12 - 20      GFR est AA 5 (L) >60 ml/min/1.73m2    GFR est non-AA 4 (L) >60 ml/min/1.73m2    Calcium 9.0 8.5 - 10.1 MG/DL   CBC WITH AUTOMATED DIFF    Collection Time: 08/10/19  8:11 AM   Result Value Ref Range    WBC 6.6 3.6 - 11.0 K/uL    RBC 3.31 (L) 3.80 - 5.20 M/uL    HGB 9.3 (L) 11.5 - 16.0 g/dL    HCT 30.2 (L) 35.0 - 47.0 %    MCV 91.2 80.0 - 99.0 FL    MCH 28.1 26.0 - 34.0 PG    MCHC 30.8 30.0 - 36.5 g/dL    RDW 15.1 (H) 11.5 - 14.5 %    PLATELET 885 003 - 762 K/uL    MPV 9.8 8.9 - 12.9 FL    NRBC 0.0 0  WBC    ABSOLUTE NRBC 0.00 0.00 - 0.01 K/uL    NEUTROPHILS 62 32 - 75 %    LYMPHOCYTES 20 12 - 49 %    MONOCYTES 15 (H) 5 - 13 %    EOSINOPHILS 2 0 - 7 %    BASOPHILS 0 0 - 1 %    IMMATURE GRANULOCYTES 1 (H) 0.0 - 0.5 %    ABS. NEUTROPHILS 4.1 1.8 - 8.0 K/UL    ABS.  LYMPHOCYTES 1.3 0.8 - 3.5 K/UL    ABS. MONOCYTES 1.0 0.0 - 1.0 K/UL    ABS. EOSINOPHILS 0.1 0.0 - 0.4 K/UL    ABS. BASOPHILS 0.0 0.0 - 0.1 K/UL    ABS. IMM.  GRANS. 0.1 (H) 0.00 - 0.04 K/UL    DF AUTOMATED     METABOLIC PANEL, BASIC    Collection Time: 08/11/19  5:06 AM   Result Value Ref Range    Sodium 136 136 - 145 mmol/L    Potassium 4.0 3.5 - 5.1 mmol/L    Chloride 99 97 - 108 mmol/L    CO2 30 21 - 32 mmol/L    Anion gap 7 5 - 15 mmol/L    Glucose 107 (H) 65 - 100 mg/dL    BUN 33 (H) 6 - 20 MG/DL    Creatinine 6.81 (H) 0.55 - 1.02 MG/DL    BUN/Creatinine ratio 5 (L) 12 - 20      GFR est AA 8 (L) >60 ml/min/1.73m2    GFR est non-AA 7 (L) >60 ml/min/1.73m2    Calcium 9.0 8.5 - 75.2 MG/DL   METABOLIC PANEL, BASIC    Collection Time: 08/13/19  7:35 AM   Result Value Ref Range    Sodium 132 (L) 136 - 145 mmol/L    Potassium 4.9 3.5 - 5.1 mmol/L    Chloride 96 (L) 97 - 108 mmol/L    CO2 26 21 - 32 mmol/L    Anion gap 10 5 - 15 mmol/L    Glucose 88 65 - 100 mg/dL    BUN 77 (H) 6 - 20 MG/DL    Creatinine 10.70 (H) 0.55 - 1.02 MG/DL    BUN/Creatinine ratio 7 (L) 12 - 20      GFR est AA 5 (L) >60 ml/min/1.73m2    GFR est non-AA 4 (L) >60 ml/min/1.73m2    Calcium 9.1 8.5 - 10.1 MG/DL   CBC W/O DIFF    Collection Time: 08/13/19  7:35 AM   Result Value Ref Range    WBC 12.3 (H) 3.6 - 11.0 K/uL    RBC 3.35 (L) 3.80 - 5.20 M/uL    HGB 9.6 (L) 11.5 - 16.0 g/dL    HCT 30.9 (L) 35.0 - 47.0 %    MCV 92.2 80.0 - 99.0 FL    MCH 28.7 26.0 - 34.0 PG    MCHC 31.1 30.0 - 36.5 g/dL    RDW 15.3 (H) 11.5 - 14.5 %    PLATELET 750 900 - 163 K/uL    MPV 9.8 8.9 - 12.9 FL    NRBC 0.0 0  WBC    ABSOLUTE NRBC 0.00 0.00 - 0.01 K/uL   PHOSPHORUS    Collection Time: 08/13/19  7:35 AM   Result Value Ref Range    Phosphorus 6.6 (H) 2.6 - 4.7 MG/DL   METABOLIC PANEL, BASIC    Collection Time: 08/14/19  5:06 AM   Result Value Ref Range    Sodium 136 136 - 145 mmol/L    Potassium 3.4 (L) 3.5 - 5.1 mmol/L    Chloride 102 97 - 108 mmol/L    CO2 25 21 - 32 mmol/L    Anion gap 9 5 - 15 mmol/L    Glucose 103 (H) 65 - 100 mg/dL    BUN 33 (H) 6 - 20 MG/DL    Creatinine 4.90 (H) 0.55 - 1.02 MG/DL    BUN/Creatinine ratio 7 (L) 12 - 20      GFR est AA 12 (L) >60 ml/min/1.73m2    GFR est non-AA 10 (L) >60 ml/min/1.73m2    Calcium 8.9 8.5 - 10.1 MG/DL           Physical Exam on Discharge:    Discharge condition: fair    Vital signs:   Patient Vitals for the past 12 hrs:   Temp Pulse Resp BP SpO2   08/14/19 0819 98.6 °F (37 °C) (!) 103 18 (!) 147/109 98 %   08/14/19 0513 98.3 °F (36.8 °C) (!) 107 18 (!) 159/114 98 %   08/14/19 0425 98 °F (36.7 °C) (!) 104 14 (!) 169/100 --   08/14/19 0420 -- (!) 110 -- 156/66 --   08/14/19 0415 -- (!) 111 -- (!) 154/103 --   08/14/19 0400 -- (!) 113 -- (!) 150/107 --   08/14/19 0345 -- (!) 109 -- (!) 157/111 --   08/14/19 0330 -- (!) 104 -- 128/90 --   08/14/19 0315 -- (!) 103 -- (!) 170/114 --   08/14/19 0300 -- (!) 105 -- (!) 139/102 --   08/14/19 0245 -- 98 -- (!) 155/105 --   08/14/19 0230 -- 92 -- (!) 171/115 --   08/14/19 0215 -- 88 -- (!) 174/117 --   08/14/19 0200 -- 88 -- (!) 176/127 --   08/14/19 0150 98.2 °F (36.8 °C) 86 14 (!) 177/124 --   08/14/19 0049 98.2 °F (36.8 °C) 85 14 (!) 167/115 96 %       General appearance: alert, cooperative, appears stated age  Lungs: clear to auscultation bilaterally    Current Discharge Medication List      CONTINUE these medications which have CHANGED    Details   dicyclomine (BENTYL) 20 mg tablet Take 1 Tab by mouth every six (6) hours for 5 days. Qty: 20 Tab, Refills: 0      pantoprazole (PROTONIX) 40 mg tablet Take 1 Tab by mouth ACB/HS. Qty: 60 Tab, Refills: 0         CONTINUE these medications which have NOT CHANGED    Details   ondansetron (ZOFRAN ODT) 8 mg disintegrating tablet Take 1 Tab by mouth every eight (8) hours as needed for Nausea.   Qty: 15 Tab, Refills: 0      oxyCODONE-acetaminophen (PERCOCET) 5-325 mg per tablet Take 1 Tab by mouth two (2) times daily as needed for Pain (severe back pain) for up to 30 days. Max Daily Amount: 2 Tabs. Qty: 30 Tab, Refills: 0    Associated Diagnoses: Chronic bilateral low back pain without sciatica      amitriptyline (ELAVIL) 75 mg tablet Take 1 Tab by mouth nightly for 90 days. For sleep difficulty  Qty: 90 Tab, Refills: 0    Comments: **Patient requests 90 days supply**  Associated Diagnoses: Insomnia, unspecified type      L.acidoph & parac-S.therm-Bifido (AJIT Q2/RISAQUAD-2) 16 billion cell cap cap Take 1 Cap by mouth daily. Qty: 10 Cap, Refills: 0      polyethylene glycol (MIRALAX) 17 gram/dose powder Put 8 capfuls of Miralax in a 32 ounce beverage and drink it, followed by 3 capfuls twice daily for the next week and follow up with your primary care physician  Qty: 500 g, Refills: 0      bismuth subsalicylate (PEPTO-BISMOL) 262 mg/15 mL suspension Take 30 mL by mouth every four (4) hours as needed for Indigestion (diarrhea). May take 30 mL every 30 minutes as needed for up to 8 doses initially. Qty: 354 mL, Refills: 0      minoxidil (LONITEN) 2.5 mg tablet Take 2.5 mg by mouth daily. losartan (COZAAR) 100 mg tablet Take 100 mg by mouth daily. hydroxychloroquine (PLAQUENIL) 200 mg tablet TAKE 2 TABLETS BY MOUTH DAILY  Qty: 180 Tab, Refills: 6    Comments: **Patient requests 90 days supply**      acetaminophen (TYLENOL EXTRA STRENGTH) 500 mg tablet Take 2 Tabs by mouth every six (6) hours as needed for Pain. Qty: 30 Tab, Refills: 0      ferric citrate (AURYXIA) 210 mg iron tablet Take 420 mg by mouth three (3) times daily (with meals). carvedilol (COREG) 25 mg tablet Take 1 Tab by mouth two (2) times daily (with meals). Qty: 60 Tab, Refills: 0      amLODIPine (NORVASC) 10 mg tablet Take 1 Tab by mouth daily. Qty: 30 Tab, Refills: 0      predniSONE (DELTASONE) 5 mg tablet Take 2 Tabs by mouth daily. Qty: 30 Tab, Refills: 0      senna (SENNA) 8.6 mg tablet Take 1 Tab by mouth daily as needed for Constipation.  for constipation      gemfibrozil (LOPID) 600 mg tablet Take 300 mg by mouth daily. azaTHIOprine (IMURAN) 50 mg tablet Take 50 mg by mouth daily (after breakfast). albuterol (PROVENTIL HFA, VENTOLIN HFA, PROAIR HFA) 90 mcg/actuation inhaler Take 1-2 Puffs by inhalation every four (4) hours as needed for Wheezing or Shortness of Breath.   Qty: 1 Inhaler, Refills: 2           Total time spent on discharge planning, counseling and co-ordination of care:   32 minutes    Domenic Bell MD  08/14/19  12:41 PM

## 2019-08-15 ENCOUNTER — PATIENT OUTREACH (OUTPATIENT)
Dept: FAMILY MEDICINE CLINIC | Age: 34
End: 2019-08-15

## 2019-08-15 NOTE — PROGRESS NOTES
.  Hospital Discharge Follow-Up      Date/Time:  2019 1:35 PM    Patient was admitted to [facility] on 19 and discharged on 19 for SBO. The physician discharge summary was available at the time of outreach. Patient was contacted within 1 business days of discharge. Top Challenges reviewed with the provider   [facility] admit 19-19 for SBO  · Consult: General surgery-probable due to constipation and adhesions  Consult GI: EGD fairly unremarkable, done on 19-Gastritis without bleeding, unspecified chronicity, unspecified gastritis type,  -EGD fairly unremarkable  · -PPI bid  · Consult Nephrology: HD T[facility]T-S schedule at [facility], -access: right arm AVF, HTN-often related to interdialytic fluid gains  · ACP not on file         Method of communication with provider :phone, staff message    Inpatient RRAT score: 32  Was this a readmission? yes   Patient stated reason for the readmission: abdominal pain, nausea    Nurse Navigator (NN) contacted the patient by telephone to perform post hospital discharge assessment. Verified name and  with patient as identifiers. Provided introduction to self, and explanation of the Nurse Navigator role. Reviewed discharge instructions and red flags with patient who verbalized understanding. Patient given an opportunity to ask questions and does not have any further questions or concerns at this time. The patient agrees to contact the PCP office for questions related to their healthcare. NN provided contact information for future reference. Patient unable to confirm when she will be able to come in for PCP visit at this time. Requested to be call on tomorrow, receiving dialysis. 19- Unable to reach patient today for PCP follow up appointment confirmation. Message left on VM and My chart message sent. Disease Specific:   Abdominal pain    Summary of patient's top problems:  1. Abdominal pain-To Ed for vomiting and abdominal pain. Patient was seen at [facility] previous night, diagnosed with constipation and sent home. At time of admission, she reports severe abd tenderness and vomiting large amounts of GI contents. Pt reported nausea and vomiting for past 4 days, she has had workups with no significant findings but had CT scan this time which showed ileus versus early partial small bowel obstruction. No fever or chills. She had vomitus that looked somewhat bloody. No leukocytosis. Pt reported passing flatus and having BM on day of admission. EGD done on 8/2/19, results EGD fairly unremarkable. 2. Accelerated HTN-History of ESRD- BP improved with dialysis. Continues home meds (amlodipine, carvedilol, lorsartan, and minoxidil). Admits to Keeping BP log. 3. ACP- patient has had several conversation- not interested in completing, says mother is her   Decision maker if she can not speak for herself. Full code. Home Health orders at discharge: [address]:   Date of initial visit:     [facility] ordered/company:   Durable Medical Equipment received:     Barriers to care? None verbalized, Pills come pre package. Patient could not be sure of what medication were being taken. Advance Care Planning:   Does patient have an Advance Directive:  not on file; education provided     Medication(s):   New Medications at Discharge: Bentyl, Protonix  Changed Medications at Discharge:   Discontinued Medications at Discharge:     Medication reconciliation was performed with patient, who verbalizes understanding of administration of home medications. There were no barriers to obtaining medications identified at this time. Referral to Pharm D needed: yes     Current Outpatient Medications   Medication Sig    dicyclomine (BENTYL) 20 mg tablet Take 1 Tab by mouth every six (6) hours for 5 days.  pantoprazole (PROTONIX) 40 mg tablet Take 1 Tab by mouth ACB/HS.     ondansetron (ZOFRAN ODT) 8 mg disintegrating tablet Take 1 Tab by mouth every eight (8) hours as needed for Nausea.  oxyCODONE-acetaminophen (PERCOCET) 5-325 mg per tablet Take 1 Tab by mouth two (2) times daily as needed for Pain (severe back pain) for up to 30 days. Max Daily Amount: 2 Tabs.  amitriptyline (ELAVIL) 75 mg tablet Take 1 Tab by mouth nightly for 90 days. For sleep difficulty    L.acidoph & parac-S.therm-Bifido (AJIT Q2/RISAQUAD-2) 16 billion cell cap cap Take 1 Cap by mouth daily.  polyethylene glycol (MIRALAX) 17 gram/dose powder Put 8 capfuls of Miralax in a 32 ounce beverage and drink it, followed by 3 capfuls twice daily for the next week and follow up with your primary care physician    bismuth subsalicylate (PEPTO-BISMOL) 262 mg/15 mL suspension Take 30 mL by mouth every four (4) hours as needed for Indigestion (diarrhea). May take 30 mL every 30 minutes as needed for up to 8 doses initially.  minoxidil (LONITEN) 2.5 mg tablet Take 2.5 mg by mouth daily.  losartan (COZAAR) 100 mg tablet Take 100 mg by mouth daily.  hydroxychloroquine (PLAQUENIL) 200 mg tablet TAKE 2 TABLETS BY MOUTH DAILY    acetaminophen (TYLENOL EXTRA STRENGTH) 500 mg tablet Take 2 Tabs by mouth every six (6) hours as needed for Pain.  ferric citrate (AURYXIA) 210 mg iron tablet Take 420 mg by mouth three (3) times daily (with meals).  carvedilol (COREG) 25 mg tablet Take 1 Tab by mouth two (2) times daily (with meals).  amLODIPine (NORVASC) 10 mg tablet Take 1 Tab by mouth daily.  predniSONE (DELTASONE) 5 mg tablet Take 2 Tabs by mouth daily.  senna (SENNA) 8.6 mg tablet Take 1 Tab by mouth daily as needed for Constipation. for constipation    gemfibrozil (LOPID) 600 mg tablet Take 300 mg by mouth daily.  azaTHIOprine (IMURAN) 50 mg tablet Take 50 mg by mouth daily (after breakfast).     albuterol (PROVENTIL HFA, VENTOLIN HFA, PROAIR HFA) 90 mcg/actuation inhaler Take 1-2 Puffs by inhalation every four (4) hours as needed for Wheezing or Shortness of Breath. No current facility-administered medications for this visit. There are no discontinued medications. [facility] follow up appointment(s):   Future Appointments   Date Time Provider Rocio Calderóni   8/19/2019  2:20 PM MD Shanna Minaya      Non-[facility] follow up appointment(s):   [facility]:  offered and patient declined       Goals        Patient [address] (pt-stated)      08/15/19  · Patient remains full code, says mother will make ACP decisions, not interested in completing form at this time. · ACP addressed with patient, who states she has information, mother is decision maker. · Encouraged to schedule with NN appointment for completion of form  · NN will follow up in 7-10 days. pk             Other     Patient/Family verbalizes understanding of self-management of HTN with ESRF      · [facility] admit with Accelerated HTN, per nephrology often related to interdialytic fluid gains  · Medication review completed  · Encouraged to take all medications as ordered  · Reviewed discharge instructions  · Reminded of scheduled follow-up appointment  · Try to limit how much sodium you eat to less than 2,300 milligrams (mg) a day. Your doctor may ask you to try to eat less than 1,500 mg a day. · Eat plenty of fruits (such as bananas and oranges), vegetables, legumes, whole grains, and low-fat dairy products. · Lower the amount of saturated fat in your diet. Saturated fat is found in animal products such as milk, cheese, and meat. Limiting these foods may help you lose weight and also lower your risk for heart disease. .  · NN will follow in 7-10 days.  pk       Understands management post discharge for acute abdominal pain      · 08/15/19 [facility] [id number] for Acute abdominal pain and hyperkalemia  Reviewed red flags for abdominal pain:   · If pain becomes severe, vomiting blood or what looks like blood, pass maroon stools, you passed out, fever, notify provider immediately  · Take pain medication as ordered with tylenol and percocet  · Take ordered laxative, fluids and fiber to prevent constipation (taking opioids)  · Agrees to follow ups as ordered. Agrees to [employer] provider if:  · You feel weak and lightheaded. · Your pain becomes focused in one area of your belly. · Your belly pain is worse after you cough or move. · You have a new or higher fever. · Reviewed discharge orders, medications and follow up appointments    · Patient verbalized agreement to follow up as scheduled. Altered Bowel elimination:  · Educated patient to Identify factors (e.g., medications, bed rest, diet) that may cause or contribute to constipation. · Patient agrees to increased fluid intake, unless contraindicated. · Referral to Evaluate medication profile for gastrointestinal side effects. · Educated patient on how to keep a food diary. · Patient agrees on a high-fiber diet, as appropriate. · Instruct her on the relationship of diet, exercise, and fluid intake to  constipation and impaction. NN will follow in 7-10 days.  pk

## 2019-08-19 ENCOUNTER — OFFICE VISIT (OUTPATIENT)
Dept: NEUROLOGY | Age: 34
End: 2019-08-19

## 2019-08-19 VITALS
HEIGHT: 65 IN | TEMPERATURE: 98.5 F | WEIGHT: 178 LBS | RESPIRATION RATE: 16 BRPM | OXYGEN SATURATION: 97 % | BODY MASS INDEX: 29.66 KG/M2 | HEART RATE: 103 BPM | DIASTOLIC BLOOD PRESSURE: 110 MMHG | SYSTOLIC BLOOD PRESSURE: 168 MMHG

## 2019-08-19 DIAGNOSIS — G89.29 CHRONIC BILATERAL LOW BACK PAIN WITHOUT SCIATICA: Primary | ICD-10-CM

## 2019-08-19 DIAGNOSIS — M54.50 CHRONIC BILATERAL LOW BACK PAIN WITHOUT SCIATICA: Primary | ICD-10-CM

## 2019-08-19 RX ORDER — OXYCODONE AND ACETAMINOPHEN 5; 325 MG/1; MG/1
1 TABLET ORAL
Qty: 22 TAB | Refills: 0 | Status: SHIPPED | OUTPATIENT
Start: 2019-08-19 | End: 2019-09-18

## 2019-08-19 NOTE — PATIENT INSTRUCTIONS
[facility]   Statement to Patients  April 1, 2014      In an effort to ensure the large volume of patient prescription refills is processed in the most efficient and expeditious manner, we are asking our patients to assist us by calling your Pharmacy for all prescription refills, this will include also your  Mail Order Pharmacy. The pharmacy will contact our office electronically to continue the refill process. Please do not wait until the last minute to call your pharmacy. We need at least 48 hours (2days) to fill prescriptions. We also encourage you to call your pharmacy before going to  your prescription to make sure it is ready. With regard to controlled substance prescription refill requests (narcotic refills) that need to be picked up at our office, we ask your cooperation by providing us with at least 72 hours (3days) notice that you will need a refill. We will not refill narcotic prescription refill requests after 4:00pm on any weekday, Monday through Thursday, or after 2:00pm on Fridays, or on the weekends. We encourage everyone to explore another way of getting your prescription refill request processed using [employer], our patient web portal through our electronic medical record system. [employer] is an efficient and effective way to communicate your medication request directly to the office and  downloadable as an judith on your smart phone . [employer] also features a review functionality that allows you to view your medication list as well as leave messages for your physician. Are you ready to get connected? If so please review the attatched instructions or speak to any of our staff to get you set up right away! Thank you so much for your cooperation. Should you have any questions please contact our Practice Administrator.     The Physicians and Staff,  [facility]

## 2019-08-19 NOTE — PROGRESS NOTES
Interval HPI:     This is a 36 y.o. female who is following up for     PMHx: chronic back pain, hx of fibromyalgia, hx of DVT/ coumadin, hx of Lupus, mild lower lumbar disc degeneration (L4-5 and L5-S1 with small annular tear at L5-S1)    Chief Complaint   Patient presents with    Pain (Chronic)     hosptialized for two week, due to stomach pain and a polyp will be going to a GI MD in September        No significant change or worsening of moderate lower back pain  Recently hospitalized for small bowel obstruction  Continues taking Percocet 5/ 325 one to two tabs per day. Says the increased dose of Amitriptyline improved her sleep, not sleepy during daytime     Tried/ failed: Gabapentin, Cymbalta, Amitriptyline (only helped get to sleep), Lyrica (abnormal behaviors), Hydrocodone (\"too strong\")    Reviewed : no abberant behaviors. Brief ROS: as above      Allergies   Allergen Reactions    Compazine [Prochlorperazine Edisylate] Nausea and Vomiting and Palpitations     \"hot and lightheaded\"     Current Outpatient Medications   Medication Sig Dispense Refill    oxyCODONE-acetaminophen (PERCOCET) 5-325 mg per tablet Take 1 Tab by mouth two (2) times daily as needed for Pain (severe back pain) for up to 30 days. Max Daily Amount: 2 Tabs. 22 Tab 0    pantoprazole (PROTONIX) 40 mg tablet Take 1 Tab by mouth ACB/HS. 60 Tab 0    ondansetron (ZOFRAN ODT) 8 mg disintegrating tablet Take 1 Tab by mouth every eight (8) hours as needed for Nausea. 15 Tab 0    amitriptyline (ELAVIL) 75 mg tablet Take 1 Tab by mouth nightly for 90 days. For sleep difficulty 90 Tab 0    L.acidoph & parac-S.therm-Bifido (AJIT Q2/RISAQUAD-2) 16 billion cell cap cap Take 1 Cap by mouth daily.  10 Cap 0    polyethylene glycol (MIRALAX) 17 gram/dose powder Put 8 capfuls of Miralax in a 32 ounce beverage and drink it, followed by 3 capfuls twice daily for the next week and follow up with your primary care physician 500 g 0    bismuth subsalicylate (PEPTO-BISMOL) 262 mg/15 mL suspension Take 30 mL by mouth every four (4) hours as needed for Indigestion (diarrhea). May take 30 mL every 30 minutes as needed for up to 8 doses initially. 354 mL 0    minoxidil (LONITEN) 2.5 mg tablet Take 2.5 mg by mouth daily.  losartan (COZAAR) 100 mg tablet Take 100 mg by mouth daily.  hydroxychloroquine (PLAQUENIL) 200 mg tablet TAKE 2 TABLETS BY MOUTH DAILY 180 Tab 6    acetaminophen (TYLENOL EXTRA STRENGTH) 500 mg tablet Take 2 Tabs by mouth every six (6) hours as needed for Pain. 30 Tab 0    ferric citrate (AURYXIA) 210 mg iron tablet Take 420 mg by mouth three (3) times daily (with meals).  carvedilol (COREG) 25 mg tablet Take 1 Tab by mouth two (2) times daily (with meals). 60 Tab 0    amLODIPine (NORVASC) 10 mg tablet Take 1 Tab by mouth daily. 30 Tab 0    predniSONE (DELTASONE) 5 mg tablet Take 2 Tabs by mouth daily. 30 Tab 0    senna (SENNA) 8.6 mg tablet Take 1 Tab by mouth daily as needed for Constipation. for constipation      gemfibrozil (LOPID) 600 mg tablet Take 300 mg by mouth daily.  azaTHIOprine (IMURAN) 50 mg tablet Take 50 mg by mouth daily (after breakfast).  albuterol (PROVENTIL HFA, VENTOLIN HFA, PROAIR HFA) 90 mcg/actuation inhaler Take 1-2 Puffs by inhalation every four (4) hours as needed for Wheezing or Shortness of Breath.  1 Inhaler 2       Physical Exam  Vitals:    08/19/19 1433 08/19/19 1452   BP: (!) 168/108 (!) 168/110   BP 1 Location: Left arm Left arm   BP Patient Position: Sitting Sitting   Pulse: (!) 103    Resp: 16    Temp: 98.5 °F (36.9 °C)    TempSrc: Oral    SpO2: 97%    Weight: 80.7 kg (178 lb)    Height: 5' 5\" (1.651 m)      PMHx:   Past Medical History:   Diagnosis Date    Anemia     secondary to lupus    Asthma     no inhaler use in past 2 to 3 years    Carditis     Chronic kidney disease     ESRD    Chronic pain     DDD (degenerative disc disease), lumbar     [facility] disease) ([facility]     GERD (gastroesophageal reflux disease)     Hemodialysis patient [facility] 2017    [address]  Tuesday,  Thursday,  and Saturday.  Hypercholesterolemia     Hypertension     Intractable nausea and vomiting 10/21/2015    Long term (current) use of anticoagulants     Lupus (systemic lupus erythematosus) (HCC)     Malignant hypertension with chronic kidney disease stage V ([facility]     Peritoneal dialysis status ([facility] 10/2015    x 2 years Stopped 2017 due to infection and removed.  Poor historian 2018    With medications    Thromboembolus [facility]) 2013    lungs    Transfusion history     Last Transfusion 2017  at [facility]     PSHx:  has a past surgical history that includes hx  section (2006); hx other surgical (9/16/15); hx vascular access (Right, 2017); and hx vascular access (Right, 2018). SocHx:  reports that she has never smoked. She has never used smokeless tobacco. She reports that she has current or past drug history. Drugs: Prescription and OTC. She reports that she does not drink alcohol. FHx: family history includes Cancer in an other family member; Diabetes in her father and mother; Hypertension in her father. Awake, alert, conversant  Neck: supple  Ext: dialysis fistula in right forearm    MSK:  Lumbar spine:  + tender to palpation across lower lumbar paraspinal muscles  Mild pain with back extension > flexion     Focused Neurological Exam     Mental status:   Alert and oriented to person, place situation  Mood appears stable  Normal thought processes    CNs: EOMI, Face symmetric, Hearing/ Language normal  Sensory: intact light touch  Motor: 4/ 5 strength in arms and legs    Reflexes: 1+ patellars  Gait: normal    Impression    ICD-10-CM ICD-9-CM    1.  Chronic bilateral low back pain without sciatica M54.5 724.2 oxyCODONE-acetaminophen (PERCOCET) 5-325 mg per tablet    G89.29 338.29         1) Chronic bilateral lower back pain without sciatica    Reduce Percocet 5-325 one tablet daily prn severe pain (# 22 tabs this month). Continue Amitriptyline 75 mg QHS, new Rx not needed today    Follow-up and Dispositions    · Return in about 1 month (around 9/16/2019).          Signed By: Hilda Buenrostro MD     August 20, 2019

## 2019-08-19 NOTE — PROGRESS NOTES
Chief Complaint   Patient presents with    Pain (Chronic)     hosptialized for two week, due to stomach pain and a polyp will be going to a GI MD in September      Visit Vitals  BP (!) 168/108 (BP 1 Location: Left arm, BP Patient Position: Sitting)   Pulse (!) 103   Temp 98.5 °F (36.9 °C) (Oral)   Resp 16   Ht 5' 5\" (1.651 m)   Wt 80.7 kg (178 lb)   SpO2 97%   BMI 29.62 kg/m²     Visit Vitals  BP (!) 168/110 (BP 1 Location: Left arm, BP Patient Position: Sitting)   Pulse (!) 103   Temp 98.5 °F (36.9 °C) (Oral)   Resp 16   Ht 5' 5\" (1.651 m)   Wt 80.7 kg (178 lb)   SpO2 97%   BMI 29.62 kg/m²       MD aware of increased B/P. Patient stated that they have to take some B/P meds soon. I let her know that she needs to make sure that her PCP is aware of increased B/P.  Patient stated understanding and also stated that she will be getting dialysis tomorrow, which may also be contributing to her increased B/P.

## 2019-08-28 ENCOUNTER — PATIENT OUTREACH (OUTPATIENT)
Dept: FAMILY MEDICINE CLINIC | Age: 34
End: 2019-08-28

## 2019-08-28 NOTE — PROGRESS NOTES
08/28/19 [facility] admit 7/31/19-8/14/19 for SBO  · Completed Outreach to Patient, patient admits to feeling good  · Patient admits to takng all medications as ordered, Medication review completed. · Denies constipation, including fiber in meals  · Plans follow up with Neurology on 9/19/19  · Plans follow up with GI on 9/13/19  · Decline PCP follow up at this time  · NN will follow in 7-10 days. pk      Current Outpatient Medications:     oxyCODONE-acetaminophen (PERCOCET) 5-325 mg per tablet, Take 1 Tab by mouth two (2) times daily as needed for Pain (severe back pain) for up to 30 days. Max Daily Amount: 2 Tabs., Disp: 22 Tab, Rfl: 0    pantoprazole (PROTONIX) 40 mg tablet, Take 1 Tab by mouth ACB/HS. , Disp: 60 Tab, Rfl: 0    ondansetron (ZOFRAN ODT) 8 mg disintegrating tablet, Take 1 Tab by mouth every eight (8) hours as needed for Nausea., Disp: 15 Tab, Rfl: 0    amitriptyline (ELAVIL) 75 mg tablet, Take 1 Tab by mouth nightly for 90 days. For sleep difficulty, Disp: 90 Tab, Rfl: 0    L.acidoph & parac-S.therm-Bifido (AJIT Q2/RISAQUAD-2) 16 billion cell cap cap, Take 1 Cap by mouth daily. , Disp: 10 Cap, Rfl: 0    polyethylene glycol (MIRALAX) 17 gram/dose powder, Put 8 capfuls of Miralax in a 32 ounce beverage and drink it, followed by 3 capfuls twice daily for the next week and follow up with your primary care physician, Disp: 500 g, Rfl: 0    bismuth subsalicylate (PEPTO-BISMOL) 262 mg/15 mL suspension, Take 30 mL by mouth every four (4) hours as needed for Indigestion (diarrhea). May take 30 mL every 30 minutes as needed for up to 8 doses initially. , Disp: 354 mL, Rfl: 0    minoxidil (LONITEN) 2.5 mg tablet, Take 2.5 mg by mouth daily. , Disp: , Rfl:     losartan (COZAAR) 100 mg tablet, Take 100 mg by mouth daily. , Disp: , Rfl:     hydroxychloroquine (PLAQUENIL) 200 mg tablet, TAKE 2 TABLETS BY MOUTH DAILY, Disp: 180 Tab, Rfl: 6    acetaminophen (TYLENOL EXTRA STRENGTH) 500 mg tablet, Take 2 Tabs by mouth every six (6) hours as needed for Pain., Disp: 30 Tab, Rfl: 0    ferric citrate (AURYXIA) 210 mg iron tablet, Take 420 mg by mouth three (3) times daily (with meals). , Disp: , Rfl:     carvedilol (COREG) 25 mg tablet, Take 1 Tab by mouth two (2) times daily (with meals). , Disp: 60 Tab, Rfl: 0    amLODIPine (NORVASC) 10 mg tablet, Take 1 Tab by mouth daily. , Disp: 30 Tab, Rfl: 0    predniSONE (DELTASONE) 5 mg tablet, Take 2 Tabs by mouth daily. , Disp: 30 Tab, Rfl: 0    senna (SENNA) 8.6 mg tablet, Take 1 Tab by mouth daily as needed for Constipation. for constipation, Disp: , Rfl:     gemfibrozil (LOPID) 600 mg tablet, Take 300 mg by mouth daily. , Disp: , Rfl:     azaTHIOprine (IMURAN) 50 mg tablet, Take 50 mg by mouth daily (after breakfast). , Disp: , Rfl:     albuterol (PROVENTIL HFA, VENTOLIN HFA, PROAIR HFA) 90 mcg/actuation inhaler, Take 1-2 Puffs by inhalation every four (4) hours as needed for Wheezing or Shortness of Breath., Disp: 1 Inhaler, Rfl: 2

## 2019-09-10 ENCOUNTER — PATIENT OUTREACH (OUTPATIENT)
Dept: FAMILY MEDICINE CLINIC | Age: 34
End: 2019-09-10

## 2019-09-10 NOTE — PROGRESS NOTES
[facility] admission for Acute hypoxia r/t pneumonia and pulmonary edema  SURJIT Follow-up assessment:   Outbound call made to patient today to complete SURJIT follow up assessment. Patient verified by 3 identifiers. Follow-up appointments reviewed, and medication reconciliation completed. Denies abdominal pain or constipation. NN contact information given for questions and concerns. Current Outpatient Medications:     oxyCODONE-acetaminophen (PERCOCET) 5-325 mg per tablet, Take 1 Tab by mouth two (2) times daily as needed for Pain (severe back pain) for up to 30 days. Max Daily Amount: 2 Tabs., Disp: 22 Tab, Rfl: 0    pantoprazole (PROTONIX) 40 mg tablet, Take 1 Tab by mouth ACB/HS. , Disp: 60 Tab, Rfl: 0    ondansetron (ZOFRAN ODT) 8 mg disintegrating tablet, Take 1 Tab by mouth every eight (8) hours as needed for Nausea., Disp: 15 Tab, Rfl: 0    amitriptyline (ELAVIL) 75 mg tablet, Take 1 Tab by mouth nightly for 90 days. For sleep difficulty, Disp: 90 Tab, Rfl: 0    L.acidoph & parac-S.therm-Bifido (AJIT Q2/RISAQUAD-2) 16 billion cell cap cap, Take 1 Cap by mouth daily. , Disp: 10 Cap, Rfl: 0    polyethylene glycol (MIRALAX) 17 gram/dose powder, Put 8 capfuls of Miralax in a 32 ounce beverage and drink it, followed by 3 capfuls twice daily for the next week and follow up with your primary care physician, Disp: 500 g, Rfl: 0    bismuth subsalicylate (PEPTO-BISMOL) 262 mg/15 mL suspension, Take 30 mL by mouth every four (4) hours as needed for Indigestion (diarrhea). May take 30 mL every 30 minutes as needed for up to 8 doses initially. , Disp: 354 mL, Rfl: 0    minoxidil (LONITEN) 2.5 mg tablet, Take 2.5 mg by mouth daily. , Disp: , Rfl:     losartan (COZAAR) 100 mg tablet, Take 100 mg by mouth daily. , Disp: , Rfl:     hydroxychloroquine (PLAQUENIL) 200 mg tablet, TAKE 2 TABLETS BY MOUTH DAILY, Disp: 180 Tab, Rfl: 6    acetaminophen (TYLENOL EXTRA STRENGTH) 500 mg tablet, Take 2 Tabs by mouth every six (6) hours as needed for Pain., Disp: 30 Tab, Rfl: 0    ferric citrate (AURYXIA) 210 mg iron tablet, Take 420 mg by mouth three (3) times daily (with meals). , Disp: , Rfl:     carvedilol (COREG) 25 mg tablet, Take 1 Tab by mouth two (2) times daily (with meals). , Disp: 60 Tab, Rfl: 0    amLODIPine (NORVASC) 10 mg tablet, Take 1 Tab by mouth daily. , Disp: 30 Tab, Rfl: 0    predniSONE (DELTASONE) 5 mg tablet, Take 2 Tabs by mouth daily. , Disp: 30 Tab, Rfl: 0    senna (SENNA) 8.6 mg tablet, Take 1 Tab by mouth daily as needed for Constipation. for constipation, Disp: , Rfl:     gemfibrozil (LOPID) 600 mg tablet, Take 300 mg by mouth daily. , Disp: , Rfl:     azaTHIOprine (IMURAN) 50 mg tablet, Take 50 mg by mouth daily (after breakfast). , Disp: , Rfl:     albuterol (PROVENTIL HFA, VENTOLIN HFA, PROAIR HFA) 90 mcg/actuation inhaler, Take 1-2 Puffs by inhalation every four (4) hours as needed for Wheezing or Shortness of Breath., Disp: 1 Inhaler, Rfl: 2    Goals Addressed                 This Visit's Progress     Understands management post discharge for acute abdominal pain        · 08/15/19 [facility] [id number] for Acute abdominal pain and hyperkalemia  Reviewed red flags for abdominal pain:   · If pain becomes severe, vomiting blood or what looks like blood, pass maroon stools, you passed out, fever, notify provider immediately  · Take pain medication as ordered with tylenol and percocet  · Take ordered laxative, fluids and fiber to prevent constipation (taking opioids)  · Agrees to follow ups as ordered. Agrees to [employer] provider if:  · You feel weak and lightheaded. · Your pain becomes focused in one area of your belly. · Your belly pain is worse after you cough or move. · You have a new or higher fever. · Reviewed discharge orders, medications and follow up appointments    · Patient verbalized agreement to follow up as scheduled.   Altered Bowel elimination:  · Educated patient to Identify factors (e.g., medications, bed rest, diet) that may cause or contribute to constipation. · Patient agrees to increased fluid intake, unless contraindicated. · Referral to Evaluate medication profile for gastrointestinal side effects. · Educated patient on how to keep a food diary. · Patient agrees on a high-fiber diet, as appropriate. · Instruct her on the relationship of diet, exercise, and fluid intake to  constipation and impaction. NN will follow in 7-10 days. pk  09/10/19  · Admits to compliance to follow up appointments  · Medications reconciliation completed  · Denies above red flags  · Denies issues with constipation or abdominal pain  · Admits to knowing how to contact providers of condition changes  · NN will follow again in 7-10 days. pk                Case management Plan:  NN will continue to attempt contacts with patient by telephone or during office visit within next 7-10 days.  Will continue to follow as necessary for the remaining 30 days post visit and will reassess for needs before discharge

## 2019-09-19 ENCOUNTER — OFFICE VISIT (OUTPATIENT)
Dept: NEUROLOGY | Age: 34
End: 2019-09-19

## 2019-09-19 VITALS
SYSTOLIC BLOOD PRESSURE: 154 MMHG | HEART RATE: 86 BPM | RESPIRATION RATE: 18 BRPM | OXYGEN SATURATION: 96 % | BODY MASS INDEX: 30.86 KG/M2 | WEIGHT: 185.2 LBS | HEIGHT: 65 IN | DIASTOLIC BLOOD PRESSURE: 100 MMHG

## 2019-09-19 DIAGNOSIS — G47.00 INSOMNIA, UNSPECIFIED TYPE: Primary | ICD-10-CM

## 2019-09-19 RX ORDER — AMITRIPTYLINE HYDROCHLORIDE 75 MG/1
75 TABLET, FILM COATED ORAL
Qty: 90 TAB | Refills: 1 | Status: SHIPPED | OUTPATIENT
Start: 2019-09-19 | End: 2019-12-05

## 2019-09-19 RX ORDER — OXYCODONE AND ACETAMINOPHEN 5; 325 MG/1; MG/1
1 TABLET ORAL
Qty: 17 TAB | Refills: 0 | Status: SHIPPED | OUTPATIENT
Start: 2019-09-19 | End: 2019-10-16 | Stop reason: SDUPTHER

## 2019-09-19 RX ORDER — APIXABAN 5 MG/1
TABLET, FILM COATED ORAL
COMMUNITY
Start: 2019-07-05 | End: 2019-10-18

## 2019-09-19 NOTE — PROGRESS NOTES
Interval HPI:     This is a 36 y.o. female who is following up for     PMHx: chronic back pain, hx of fibromyalgia, hx of DVT/ coumadin, hx of Lupus, mild lower lumbar disc degeneration (L4-5 and L5-S1 with small annular tear at L5-S1)    Chief Complaint   Patient presents with    Back Pain     No worsening of back pain as percocet is being gradually tapered. Continues to report moderate lower back pain. Taking Percocet 5/325 HALF tablet every AM and sometimes takes an additional HALF tablet later in the day. Amitripytline 75 mg QHS helping her insomnia. Tried/ failed: Gabapentin, Cymbalta, Amitriptyline (only helped get to sleep), Lyrica (abnormal behaviors), Hydrocodone (\"too strong\")    Brief ROS: as above      Allergies   Allergen Reactions    Compazine [Prochlorperazine Edisylate] Nausea and Vomiting and Palpitations     \"hot and lightheaded\"     Current Outpatient Medications   Medication Sig Dispense Refill    ELIQUIS 5 mg tablet       amitriptyline (ELAVIL) 75 mg tablet Take 1 Tab by mouth nightly for 90 days. For sleep difficulty 90 Tab 1    oxyCODONE-acetaminophen (PERCOCET) 5-325 mg per tablet Take 1 Tab by mouth daily as needed for Pain for up to 30 days. 17 Tab 0    pantoprazole (PROTONIX) 40 mg tablet Take 1 Tab by mouth ACB/HS. 60 Tab 0    ondansetron (ZOFRAN ODT) 8 mg disintegrating tablet Take 1 Tab by mouth every eight (8) hours as needed for Nausea. 15 Tab 0    L.acidoph & parac-S.therm-Bifido (AJIT Q2/RISAQUAD-2) 16 billion cell cap cap Take 1 Cap by mouth daily. 10 Cap 0    polyethylene glycol (MIRALAX) 17 gram/dose powder Put 8 capfuls of Miralax in a 32 ounce beverage and drink it, followed by 3 capfuls twice daily for the next week and follow up with your primary care physician 500 g 0    bismuth subsalicylate (PEPTO-BISMOL) 262 mg/15 mL suspension Take 30 mL by mouth every four (4) hours as needed for Indigestion (diarrhea).  May take 30 mL every 30 minutes as needed for up to 8 doses initially. 354 mL 0    minoxidil (LONITEN) 2.5 mg tablet Take 2.5 mg by mouth daily.  losartan (COZAAR) 100 mg tablet Take 100 mg by mouth daily.  hydroxychloroquine (PLAQUENIL) 200 mg tablet TAKE 2 TABLETS BY MOUTH DAILY 180 Tab 6    acetaminophen (TYLENOL EXTRA STRENGTH) 500 mg tablet Take 2 Tabs by mouth every six (6) hours as needed for Pain. 30 Tab 0    ferric citrate (AURYXIA) 210 mg iron tablet Take 420 mg by mouth three (3) times daily (with meals).  carvedilol (COREG) 25 mg tablet Take 1 Tab by mouth two (2) times daily (with meals). 60 Tab 0    amLODIPine (NORVASC) 10 mg tablet Take 1 Tab by mouth daily. 30 Tab 0    predniSONE (DELTASONE) 5 mg tablet Take 2 Tabs by mouth daily. 30 Tab 0    senna (SENNA) 8.6 mg tablet Take 1 Tab by mouth daily as needed for Constipation. for constipation      gemfibrozil (LOPID) 600 mg tablet Take 300 mg by mouth daily.  azaTHIOprine (IMURAN) 50 mg tablet Take 50 mg by mouth daily (after breakfast).  albuterol (PROVENTIL HFA, VENTOLIN HFA, PROAIR HFA) 90 mcg/actuation inhaler Take 1-2 Puffs by inhalation every four (4) hours as needed for Wheezing or Shortness of Breath. 1 Inhaler 2       Physical Exam  Vitals:    09/19/19 1102   BP: (!) 154/100   BP 1 Location: Left arm   BP Patient Position: Sitting   Pulse: 86   Resp: 18   SpO2: 96%   Weight: 84 kg (185 lb 3.2 oz)   Height: 5' 5\" (1.651 m)     PMHx:   Past Medical History:   Diagnosis Date    Anemia     secondary to lupus    Asthma     no inhaler use in past 2 to 3 years    Carditis     Chronic kidney disease     ESRD    Chronic pain     DDD (degenerative disc disease), lumbar     ESRD (end stage renal disease) (HCC)     GERD (gastroesophageal reflux disease)     Hemodialysis patient ([facility] 12/21/2017    [address]  Tuesday,  Thursday,  and Saturday.     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     Peritoneal dialysis status ([facility] 10/2015    x 2 years Stopped 2017 due to infection and removed.  Poor historian 2018    With medications    Thromboembolus [facility]) 2013    lungs    Transfusion history     Last Transfusion 2017  at [facility]     PSHx:  has a past surgical history that includes hx  section (2006); hx other surgical (9/16/15); hx vascular access (Right, 2017); and hx vascular access (Right, 2018). SocHx:  reports that she has never smoked. She has never used smokeless tobacco. She reports that she has current or past drug history. Drugs: Prescription and OTC. She reports that she does not drink alcohol. FHx: family history includes Cancer in an other family member; Diabetes in her father and mother; Hypertension in her father. Awake, alert, conversant  Neck: supple  Ext: dialysis fistula in right forearm    MSK:  Lumbar spine:  + tender to palpation across lower lumbar paraspinal muscles  Mild pain with back extension > flexion     Focused Neurological Exam     Mental status:   Alert and oriented to person, place situation  Mood appears stable  Normal thought processes    CNs: EOMI, Face symmetric, Hearing/ Language normal  Sensory: intact light touch  Motor: 4/ 5 strength in arms and legs    Reflexes: 1+ patellars  Gait: normal    Impression    ICD-10-CM ICD-9-CM    1. Insomnia, unspecified type G47.00 780.52 amitriptyline (ELAVIL) 75 mg tablet      oxyCODONE-acetaminophen (PERCOCET) 5-325 mg per tablet        1) Chronic bilateral lower back pain without sciatica    Reduced Percocet 5-325 to  HALF to one tablet daily prn severe pain (# 17 tabs this month).     Renewed Rx Amitriptyline 75 mg QHS  Fu in 1 month    Signed By: Andre Bryant MD     2019

## 2019-09-19 NOTE — PROGRESS NOTES
Chief Complaint   Patient presents with    Back Pain     Did not take BP meds this morning as BP drops during dialysis.   She wanted to wait until she gets home

## 2019-09-20 ENCOUNTER — PATIENT OUTREACH (OUTPATIENT)
Dept: FAMILY MEDICINE CLINIC | Age: 34
End: 2019-09-20

## 2019-09-20 NOTE — PROGRESS NOTES
[facility] admission for Acute hypoxia r/t pneumonia and pulmonary edema  SURJIT Follow-up assessment:  Outbound call made to patient today to complete SURJIT follow up assessment. Patient verified by 3 identifiers. Still has some chronic back and stomach pain. Follow-up appointments reviewed, and medication reconciliation completed. NN contact information given for questions and concerns. Current Outpatient Medications:     ELIQUIS 5 mg tablet, , Disp: , Rfl:     amitriptyline (ELAVIL) 75 mg tablet, Take 1 Tab by mouth nightly for 90 days. For sleep difficulty, Disp: 90 Tab, Rfl: 1    oxyCODONE-acetaminophen (PERCOCET) 5-325 mg per tablet, Take 1 Tab by mouth daily as needed for Pain for up to 30 days. , Disp: 17 Tab, Rfl: 0    pantoprazole (PROTONIX) 40 mg tablet, Take 1 Tab by mouth ACB/HS. , Disp: 60 Tab, Rfl: 0    ondansetron (ZOFRAN ODT) 8 mg disintegrating tablet, Take 1 Tab by mouth every eight (8) hours as needed for Nausea., Disp: 15 Tab, Rfl: 0    L.acidoph & parac-S.therm-Bifido (AJIT Q2/RISAQUAD-2) 16 billion cell cap cap, Take 1 Cap by mouth daily. , Disp: 10 Cap, Rfl: 0    polyethylene glycol (MIRALAX) 17 gram/dose powder, Put 8 capfuls of Miralax in a 32 ounce beverage and drink it, followed by 3 capfuls twice daily for the next week and follow up with your primary care physician, Disp: 500 g, Rfl: 0    bismuth subsalicylate (PEPTO-BISMOL) 262 mg/15 mL suspension, Take 30 mL by mouth every four (4) hours as needed for Indigestion (diarrhea). May take 30 mL every 30 minutes as needed for up to 8 doses initially. , Disp: 354 mL, Rfl: 0    minoxidil (LONITEN) 2.5 mg tablet, Take 2.5 mg by mouth daily. , Disp: , Rfl:     losartan (COZAAR) 100 mg tablet, Take 100 mg by mouth daily. , Disp: , Rfl:     hydroxychloroquine (PLAQUENIL) 200 mg tablet, TAKE 2 TABLETS BY MOUTH DAILY, Disp: 180 Tab, Rfl: 6    acetaminophen (TYLENOL EXTRA STRENGTH) 500 mg tablet, Take 2 Tabs by mouth every six (6) hours as needed for Pain., Disp: 30 Tab, Rfl: 0    ferric citrate (AURYXIA) 210 mg iron tablet, Take 420 mg by mouth three (3) times daily (with meals). , Disp: , Rfl:     carvedilol (COREG) 25 mg tablet, Take 1 Tab by mouth two (2) times daily (with meals). , Disp: 60 Tab, Rfl: 0    amLODIPine (NORVASC) 10 mg tablet, Take 1 Tab by mouth daily. , Disp: 30 Tab, Rfl: 0    predniSONE (DELTASONE) 5 mg tablet, Take 2 Tabs by mouth daily. , Disp: 30 Tab, Rfl: 0    senna (SENNA) 8.6 mg tablet, Take 1 Tab by mouth daily as needed for Constipation. for constipation, Disp: , Rfl:     gemfibrozil (LOPID) 600 mg tablet, Take 300 mg by mouth daily. , Disp: , Rfl:     azaTHIOprine (IMURAN) 50 mg tablet, Take 50 mg by mouth daily (after breakfast). , Disp: , Rfl:     albuterol (PROVENTIL HFA, VENTOLIN HFA, PROAIR HFA) 90 mcg/actuation inhaler, Take 1-2 Puffs by inhalation every four (4) hours as needed for Wheezing or Shortness of Breath., Disp: 1 Inhaler, Rfl: 2  Goals Addressed                 This Visit's Progress       Patient Stated     COMPLETED: Patient/Family verbalizes understanding of self-management of  disease. (pt-stated)        4/25/18-   · Patient verbalized understanding of plan of care, attending dialysis and appointments as scheduled. · Was seen by [facility] on 4/23/18. pk    4/30/18-   · Patient educated on diet restrictions at discharge,  · NN will continue attempts to reach for follow-up assessment and educate on diet restrictions. · Patient attending dialysis at this time on 5/1/18. pk  ·   07/23/18   · H-Dialysis continure on TTH and Sat, Patient verbalized understanding pk  · Patient verbalized decline for home care at this time. pk    5/21/18-   · discharge instructions and medication reconciliation reviewed. · Patient verbalized understanding of plan of care. pk  6/29/18 Reviewed discharge instructions and medications with patient. Reminded to call [facility], for f/u appt per hospital rec. Also to schedule f/u with surgeon, , for 2 weeks, and Dr.Deep Feliz(neph) for one week. NN sched SURJIT with pcp, will see  on 7/2 at 11am.mbt    07/23/18   · Verbalized understanding of disease process, when and how to call for help. · NN reviewed discharge instructions and ask patient to log her BPs in log book to bring to office. · NN will follow up in one week. pk    07/27/18   · Patient sent to [facility] for SOB during Dialysis on 7/26/18. Patient without acute distress on admission to ED. O2 sat at 96%. · Patient discharged home-no change in plan of care  · NN will follow in one week. pk  8/21/18- [facility] 8/15-8/20. Alex [facility] 09/20/18   · Reminded to check bp qd and record results. · Notify NN/provider of elevated levels- > 170/90. · NN will follow in one week. pk  10/16/18  · Verbalized BP is much better controlled. · Not keeping log as instructed. · Denies SOB. Admits to adherence to plan. pk  · NN will follow in one week. pk  10/31/18  · [facility] Ed on 10/30/18 for chest pain and SOB  · Elevated /112- HR-115  · Patient did not check BP today, encouraged to check and NN will call back  · Returned call at 11:45 Am- BP -103/87, HR-99  · Verbalized willingness to check BP and record daily, Notify Provider if >170/90  · Patient will report and have baseline BP  · NN will follow in one week. pk  11/07/18  · Verbalized doing better, no further episodes of chest pain  · BP remains at stable range of 120-140 sys  · Did not document today. · NN will follow in one week. pk  .11/28/18  · Patient had PCP follow-up as scheduled  · Reports doing well  · Continues dialysis. · NN will follow in one week.  pk          Other     Understands management post discharge for acute abdominal pain        · 08/15/19 [facility] [id number] for Acute abdominal pain and hyperkalemia  Reviewed red flags for abdominal pain:   · If pain becomes severe, vomiting blood or what looks like blood, pass maroon stools, you passed out, fever, notify provider immediately  · Take pain medication as ordered with tylenol and percocet  · Take ordered laxative, fluids and fiber to prevent constipation (taking opioids)  · Agrees to follow ups as ordered. Agrees to [employer] provider if:  · You feel weak and lightheaded. · Your pain becomes focused in one area of your belly. · Your belly pain is worse after you cough or move. · You have a new or higher fever. · Reviewed discharge orders, medications and follow up appointments    · Patient verbalized agreement to follow up as scheduled. Altered Bowel elimination:  · Educated patient to Identify factors (e.g., medications, bed rest, diet) that may cause or contribute to constipation. · Patient agrees to increased fluid intake, unless contraindicated. · Referral to Evaluate medication profile for gastrointestinal side effects. · Educated patient on how to keep a food diary. · Patient agrees on a high-fiber diet, as appropriate. · Instruct her on the relationship of diet, exercise, and fluid intake to  constipation and impaction. NN will follow in 7-10 days. pk  09/10/19  · Admits to compliance to follow up appointments  · Medications reconciliation completed  · Denies above red flags  · Denies issues with constipation or abdominal pain  · Admits to knowing how to contact providers of condition changes  · NN will follow again in 7-10 days. pk    09/20/19  · Admits to feeling well  · Had Neurology follow up on 9/19/19  · Continue to taper her percocet with no increase in pain  · Remain compliant with [facility]  Tuesday,  Thursday,  and Saturday  · Med reconciliation completed  · Reviewed follow up appointments. · NN will follow in 7-10 days. pk                Case management Plan:  NN will continue to attempt contacts with patient by telephone or during office visit within next 7-10 days.  Will continue to follow as necessary for the remaining 30 days post visit and will reassess for needs before discharge

## 2019-10-16 ENCOUNTER — OFFICE VISIT (OUTPATIENT)
Dept: NEUROLOGY | Age: 34
End: 2019-10-16

## 2019-10-16 VITALS
BODY MASS INDEX: 32.15 KG/M2 | WEIGHT: 193 LBS | OXYGEN SATURATION: 94 % | SYSTOLIC BLOOD PRESSURE: 160 MMHG | HEART RATE: 101 BPM | HEIGHT: 65 IN | DIASTOLIC BLOOD PRESSURE: 100 MMHG

## 2019-10-16 DIAGNOSIS — M54.50 CHRONIC BILATERAL LOW BACK PAIN WITHOUT SCIATICA: ICD-10-CM

## 2019-10-16 DIAGNOSIS — G47.00 INSOMNIA, UNSPECIFIED TYPE: Primary | ICD-10-CM

## 2019-10-16 DIAGNOSIS — G89.29 CHRONIC BILATERAL LOW BACK PAIN WITHOUT SCIATICA: ICD-10-CM

## 2019-10-16 RX ORDER — AMITRIPTYLINE HYDROCHLORIDE 25 MG/1
25 TABLET, FILM COATED ORAL
Qty: 30 TAB | Refills: 0 | Status: SHIPPED | OUTPATIENT
Start: 2019-10-16 | End: 2019-10-18

## 2019-10-16 RX ORDER — OXYCODONE AND ACETAMINOPHEN 5; 325 MG/1; MG/1
1 TABLET ORAL
Qty: 14 TAB | Refills: 0 | Status: SHIPPED | OUTPATIENT
Start: 2019-10-16 | End: 2019-10-18

## 2019-10-16 NOTE — PROGRESS NOTES
Interval HPI:     This is a 36 y.o. female who is following up for     PMHx: chronic back pain, hx of fibromyalgia, hx of DVT/ coumadin, hx of Lupus, mild lower lumbar disc degeneration (L4-5 and L5-S1 with small annular tear at L5-S1)    Chief Complaint   Patient presents with    Follow-up     4wk    Back Pain     No worsening of back pain as percocet is being gradually tapered. Pt reports her Brother was found  today at home. Family believes it was drug overdose (was in rehab x 2) and syringe found nearby. Pt is requesting increase of Amitriptyline to help her sleep. No change in chronic low back pain. Taking Percocet 5/325 HALF tablet most mornings and some days an additional HALF tablet in afternoon or evening. Reviewed : last filled Rx Perccoet on 19 (#17 tablets). Tried/ failed: Gabapentin, Cymbalta, Amitriptyline (only helped get to sleep), Lyrica (abnormal behaviors), Hydrocodone (\"too strong\")    Brief ROS: as above      Allergies   Allergen Reactions    Compazine [Prochlorperazine Edisylate] Nausea and Vomiting and Palpitations     \"hot and lightheaded\"     Current Outpatient Medications   Medication Sig Dispense Refill    oxyCODONE-acetaminophen (PERCOCET) 5-325 mg per tablet Take 1 Tab by mouth daily as needed for Pain for up to 30 days. 14 Tab 0    amitriptyline (ELAVIL) 25 mg tablet Take 1 Tab by mouth nightly. 30 Tab 0    ELIQUIS 5 mg tablet       amitriptyline (ELAVIL) 75 mg tablet Take 1 Tab by mouth nightly for 90 days. For sleep difficulty 90 Tab 1    pantoprazole (PROTONIX) 40 mg tablet Take 1 Tab by mouth ACB/HS. 60 Tab 0    ondansetron (ZOFRAN ODT) 8 mg disintegrating tablet Take 1 Tab by mouth every eight (8) hours as needed for Nausea. 15 Tab 0    L.acidoph & parac-S.therm-Bifido (AJIT Q2/RISAQUAD-2) 16 billion cell cap cap Take 1 Cap by mouth daily.  10 Cap 0    polyethylene glycol (MIRALAX) 17 gram/dose powder Put 8 capfuls of Miralax in a 32 ounce beverage and drink it, followed by 3 capfuls twice daily for the next week and follow up with your primary care physician 500 g 0    bismuth subsalicylate (PEPTO-BISMOL) 262 mg/15 mL suspension Take 30 mL by mouth every four (4) hours as needed for Indigestion (diarrhea). May take 30 mL every 30 minutes as needed for up to 8 doses initially. 354 mL 0    minoxidil (LONITEN) 2.5 mg tablet Take 2.5 mg by mouth daily.  losartan (COZAAR) 100 mg tablet Take 100 mg by mouth daily.  hydroxychloroquine (PLAQUENIL) 200 mg tablet TAKE 2 TABLETS BY MOUTH DAILY 180 Tab 6    acetaminophen (TYLENOL EXTRA STRENGTH) 500 mg tablet Take 2 Tabs by mouth every six (6) hours as needed for Pain. 30 Tab 0    ferric citrate (AURYXIA) 210 mg iron tablet Take 420 mg by mouth three (3) times daily (with meals).  carvedilol (COREG) 25 mg tablet Take 1 Tab by mouth two (2) times daily (with meals). 60 Tab 0    amLODIPine (NORVASC) 10 mg tablet Take 1 Tab by mouth daily. 30 Tab 0    predniSONE (DELTASONE) 5 mg tablet Take 2 Tabs by mouth daily. 30 Tab 0    senna (SENNA) 8.6 mg tablet Take 1 Tab by mouth daily as needed for Constipation. for constipation      gemfibrozil (LOPID) 600 mg tablet Take 300 mg by mouth daily.  azaTHIOprine (IMURAN) 50 mg tablet Take 50 mg by mouth daily (after breakfast).  albuterol (PROVENTIL HFA, VENTOLIN HFA, PROAIR HFA) 90 mcg/actuation inhaler Take 1-2 Puffs by inhalation every four (4) hours as needed for Wheezing or Shortness of Breath.  1 Inhaler 2       Physical Exam  Vitals:    10/16/19 1307   BP: (!) 160/100   Pulse: (!) 101   SpO2: 94%   Weight: 87.5 kg (193 lb)   Height: 5' 5\" (1.651 m)     PMHx:   Past Medical History:   Diagnosis Date    Anemia     secondary to lupus    Asthma     no inhaler use in past 2 to 3 years    Carditis     Chronic kidney disease     ESRD    Chronic pain     DDD (degenerative disc disease), lumbar     [facility]) [facility] [address]     GERD (gastroesophageal reflux disease)     Hemodialysis patient [facility]) 2017    [address]  Tuesday,  Thursday,  and Saturday.  Hypercholesterolemia     Hypertension     Intractable nausea and vomiting 10/21/2015    Long term (current) use of anticoagulants     Lupus (systemic lupus erythematosus) (HCC)     Malignant hypertension with chronic kidney disease stage V ([facility] [address] [facility]     Peritoneal dialysis status ([facility] [address] 10/2015    x 2 years Stopped 2017 due to infection and removed.  Poor historian 2018    With medications    Thromboembolus [facility]) 2013    lungs    Transfusion history     Last Transfusion 2017  at [facility]     PSHx:  has a past surgical history that includes hx  section (2006); hx other surgical (9/16/15); hx vascular access (Right, 2017); and hx vascular access (Right, ). SocHx:  reports that she has never smoked. She has never used smokeless tobacco. She reports that she has current or past drug history. Drugs: Prescription and OTC. She reports that she does not drink alcohol. FHx: family history includes Cancer in an other family member; Diabetes in her father and mother; Hypertension in her father. Awake, alert, conversant  Neck: supple  Ext: dialysis fistula in right forearm    MSK:  Lumbar spine:  + tender to palpation across lower lumbar paraspinal muscles  Mild pain with back extension > flexion     Focused Neurological Exam     Mental status:   Alert and oriented to person, place situation  Mood appears stable  Normal thought processes    CNs: EOMI, Face symmetric, Hearing/ Language normal  Sensory: intact light touch  Motor: 4/ 5 strength in arms and legs    Reflexes: not examined  Gait: normal    Impression    ICD-10-CM ICD-9-CM    1. Insomnia, unspecified type G47.00 780.52 amitriptyline (ELAVIL) 25 mg tablet   2.  Chronic bilateral low back pain without sciatica M54.5 724.2 oxyCODONE-acetaminophen (PERCOCET) 5-325 mg per tablet    G89.29 338.29           1) Chronic bilateral lower back pain without sciatica    Reduced Percocet 5-325 to #14 tabs this month. Advised pt not to use every day, and when she does need it, to only take HALF tablet on those days. Continue Amitriptyline 75 mg QHS. Added Amitriptyline 25 mg tab QHS to help sleep.  Fu in 1 month        Signed By: Ezra Martinez MD     October 16, 2019

## 2019-10-16 NOTE — PROGRESS NOTES
Pt is here today for a f/u on her back pain. Pt needs a refill on her pain medication as well. Pt states she also needs something for sleep for a few days they just found her brother  today.

## 2019-10-17 ENCOUNTER — PATIENT OUTREACH (OUTPATIENT)
Dept: FAMILY MEDICINE CLINIC | Age: 34
End: 2019-10-17

## 2019-10-18 ENCOUNTER — APPOINTMENT (OUTPATIENT)
Dept: MRI IMAGING | Age: 34
DRG: 100 | End: 2019-10-18
Attending: INTERNAL MEDICINE
Payer: MEDICARE

## 2019-10-18 ENCOUNTER — HOSPITAL ENCOUNTER (INPATIENT)
Age: 34
LOS: 2 days | Discharge: HOME | DRG: 100 | End: 2019-10-20
Attending: STUDENT IN AN ORGANIZED HEALTH CARE EDUCATION/TRAINING PROGRAM | Admitting: INTERNAL MEDICINE
Payer: MEDICARE

## 2019-10-18 ENCOUNTER — APPOINTMENT (OUTPATIENT)
Dept: CT IMAGING | Age: 34
DRG: 100 | End: 2019-10-18
Attending: STUDENT IN AN ORGANIZED HEALTH CARE EDUCATION/TRAINING PROGRAM
Payer: MEDICARE

## 2019-10-18 ENCOUNTER — APPOINTMENT (OUTPATIENT)
Dept: GENERAL RADIOLOGY | Age: 34
DRG: 100 | End: 2019-10-18
Attending: STUDENT IN AN ORGANIZED HEALTH CARE EDUCATION/TRAINING PROGRAM
Payer: MEDICARE

## 2019-10-18 DIAGNOSIS — R56.9 SEIZURE (HCC): ICD-10-CM

## 2019-10-18 DIAGNOSIS — R94.31 PROLONGED Q-T INTERVAL ON ECG: ICD-10-CM

## 2019-10-18 DIAGNOSIS — F05 POST-ICTAL CONFUSION: ICD-10-CM

## 2019-10-18 DIAGNOSIS — R56.9 WITNESSED SEIZURE-LIKE ACTIVITY (HCC): Primary | ICD-10-CM

## 2019-10-18 LAB
ABO + RH BLD: NORMAL
ALBUMIN SERPL-MCNC: 3.6 G/DL (ref 3.5–5)
ALBUMIN/GLOB SERPL: 0.6 {RATIO} (ref 1.1–2.2)
ALP SERPL-CCNC: 64 U/L (ref 45–117)
ALT SERPL-CCNC: 15 U/L (ref 12–78)
ANION GAP BLD CALC-SCNC: 13 MMOL/L (ref 10–20)
ANION GAP SERPL CALC-SCNC: 8 MMOL/L (ref 5–15)
AST SERPL-CCNC: 28 U/L (ref 15–37)
BILIRUB SERPL-MCNC: 0.4 MG/DL (ref 0.2–1)
BLOOD GROUP ANTIBODIES SERPL: NORMAL
BUN BLD-MCNC: 16 MG/DL (ref 9–20)
BUN SERPL-MCNC: 14 MG/DL (ref 6–20)
BUN/CREAT SERPL: 3 (ref 12–20)
CA-I BLD-MCNC: 1.01 MMOL/L (ref 1.12–1.32)
CALCIUM SERPL-MCNC: 9.2 MG/DL (ref 8.5–10.1)
CHLORIDE BLD-SCNC: 96 MMOL/L (ref 98–107)
CHLORIDE SERPL-SCNC: 96 MMOL/L (ref 97–108)
CO2 BLD-SCNC: 32 MMOL/L (ref 21–32)
CO2 SERPL-SCNC: 29 MMOL/L (ref 21–32)
COMMENT, HOLDF: NORMAL
CREAT BLD-MCNC: 4.4 MG/DL (ref 0.6–1.3)
CREAT SERPL-MCNC: 4.06 MG/DL (ref 0.55–1.02)
ETHANOL SERPL-MCNC: <10 MG/DL
GLOBULIN SER CALC-MCNC: 5.6 G/DL (ref 2–4)
GLUCOSE BLD STRIP.AUTO-MCNC: 85 MG/DL (ref 65–100)
GLUCOSE BLD-MCNC: 85 MG/DL (ref 65–100)
GLUCOSE SERPL-MCNC: 82 MG/DL (ref 65–100)
HCT VFR BLD CALC: 42 % (ref 35–47)
INR PPP: 1.1 (ref 0.9–1.1)
LACTATE BLD-SCNC: 1.86 MMOL/L (ref 0.4–2)
MAGNESIUM SERPL-MCNC: 2.2 MG/DL (ref 1.6–2.4)
POTASSIUM BLD-SCNC: 4.3 MMOL/L (ref 3.5–5.1)
POTASSIUM SERPL-SCNC: 4.3 MMOL/L (ref 3.5–5.1)
PROT SERPL-MCNC: 9.2 G/DL (ref 6.4–8.2)
PROTHROMBIN TIME: 11.1 SEC (ref 9–11.1)
SAMPLES BEING HELD,HOLD: NORMAL
SERVICE CMNT-IMP: ABNORMAL
SERVICE CMNT-IMP: NORMAL
SODIUM BLD-SCNC: 135 MMOL/L (ref 136–145)
SODIUM SERPL-SCNC: 133 MMOL/L (ref 136–145)
SPECIMEN EXP DATE BLD: NORMAL
TROPONIN I SERPL-MCNC: <0.05 NG/ML

## 2019-10-18 PROCEDURE — 80307 DRUG TEST PRSMV CHEM ANLYZR: CPT

## 2019-10-18 PROCEDURE — 74011250636 HC RX REV CODE- 250/636: Performed by: INTERNAL MEDICINE

## 2019-10-18 PROCEDURE — 84484 ASSAY OF TROPONIN QUANT: CPT

## 2019-10-18 PROCEDURE — 83605 ASSAY OF LACTIC ACID: CPT

## 2019-10-18 PROCEDURE — 80047 BASIC METABLC PNL IONIZED CA: CPT

## 2019-10-18 PROCEDURE — 83735 ASSAY OF MAGNESIUM: CPT

## 2019-10-18 PROCEDURE — 65660000000 HC RM CCU STEPDOWN

## 2019-10-18 PROCEDURE — 94762 N-INVAS EAR/PLS OXIMTRY CONT: CPT

## 2019-10-18 PROCEDURE — 99283 EMERGENCY DEPT VISIT LOW MDM: CPT

## 2019-10-18 PROCEDURE — 36415 COLL VENOUS BLD VENIPUNCTURE: CPT

## 2019-10-18 PROCEDURE — 93005 ELECTROCARDIOGRAM TRACING: CPT

## 2019-10-18 PROCEDURE — 71045 X-RAY EXAM CHEST 1 VIEW: CPT

## 2019-10-18 PROCEDURE — 70450 CT HEAD/BRAIN W/O DYE: CPT

## 2019-10-18 PROCEDURE — 86900 BLOOD TYPING SEROLOGIC ABO: CPT

## 2019-10-18 PROCEDURE — 85610 PROTHROMBIN TIME: CPT

## 2019-10-18 PROCEDURE — 70551 MRI BRAIN STEM W/O DYE: CPT

## 2019-10-18 PROCEDURE — 95816 EEG AWAKE AND DROWSY: CPT | Performed by: INTERNAL MEDICINE

## 2019-10-18 PROCEDURE — 80053 COMPREHEN METABOLIC PANEL: CPT

## 2019-10-18 PROCEDURE — 82962 GLUCOSE BLOOD TEST: CPT

## 2019-10-18 RX ORDER — ALBUTEROL SULFATE 90 UG/1
1-2 AEROSOL, METERED RESPIRATORY (INHALATION)
Status: DISCONTINUED | OUTPATIENT
Start: 2019-10-18 | End: 2019-10-18 | Stop reason: CLARIF

## 2019-10-18 RX ORDER — AZATHIOPRINE 50 MG/1
50 TABLET ORAL
Status: DISCONTINUED | OUTPATIENT
Start: 2019-10-19 | End: 2019-10-20 | Stop reason: HOSPADM

## 2019-10-18 RX ORDER — HEPARIN SODIUM 5000 [USP'U]/ML
5000 INJECTION, SOLUTION INTRAVENOUS; SUBCUTANEOUS EVERY 8 HOURS
Status: DISCONTINUED | OUTPATIENT
Start: 2019-10-18 | End: 2019-10-19 | Stop reason: SDUPTHER

## 2019-10-18 RX ORDER — LORAZEPAM 2 MG/ML
2 INJECTION INTRAMUSCULAR
Status: DISCONTINUED | OUTPATIENT
Start: 2019-10-18 | End: 2019-10-20 | Stop reason: HOSPADM

## 2019-10-18 RX ORDER — ALBUTEROL SULFATE 0.83 MG/ML
2.5 SOLUTION RESPIRATORY (INHALATION)
Status: DISCONTINUED | OUTPATIENT
Start: 2019-10-18 | End: 2019-10-20 | Stop reason: HOSPADM

## 2019-10-18 RX ORDER — CARVEDILOL 12.5 MG/1
25 TABLET ORAL 2 TIMES DAILY WITH MEALS
Status: DISCONTINUED | OUTPATIENT
Start: 2019-10-19 | End: 2019-10-20 | Stop reason: HOSPADM

## 2019-10-18 RX ORDER — SODIUM CHLORIDE 0.9 % (FLUSH) 0.9 %
5-40 SYRINGE (ML) INJECTION EVERY 8 HOURS
Status: DISCONTINUED | OUTPATIENT
Start: 2019-10-18 | End: 2019-10-20 | Stop reason: HOSPADM

## 2019-10-18 RX ORDER — SODIUM CHLORIDE 0.9 % (FLUSH) 0.9 %
5-40 SYRINGE (ML) INJECTION AS NEEDED
Status: DISCONTINUED | OUTPATIENT
Start: 2019-10-18 | End: 2019-10-20 | Stop reason: HOSPADM

## 2019-10-18 RX ORDER — PREDNISONE 10 MG/1
10 TABLET ORAL DAILY
Status: DISCONTINUED | OUTPATIENT
Start: 2019-10-19 | End: 2019-10-20 | Stop reason: HOSPADM

## 2019-10-18 RX ORDER — AMLODIPINE BESYLATE 5 MG/1
10 TABLET ORAL DAILY
Status: DISCONTINUED | OUTPATIENT
Start: 2019-10-19 | End: 2019-10-20 | Stop reason: HOSPADM

## 2019-10-18 RX ORDER — LOSARTAN POTASSIUM 50 MG/1
100 TABLET ORAL DAILY
Status: DISCONTINUED | OUTPATIENT
Start: 2019-10-19 | End: 2019-10-20 | Stop reason: HOSPADM

## 2019-10-18 RX ORDER — HYDROXYCHLOROQUINE SULFATE 200 MG/1
400 TABLET, FILM COATED ORAL
Status: DISCONTINUED | OUTPATIENT
Start: 2019-10-19 | End: 2019-10-20 | Stop reason: HOSPADM

## 2019-10-18 RX ADMIN — HEPARIN SODIUM 5000 UNITS: 5000 INJECTION INTRAVENOUS; SUBCUTANEOUS at 21:50

## 2019-10-18 RX ADMIN — Medication 10 ML: at 21:50

## 2019-10-18 NOTE — ROUTINE PROCESS
TRANSFER - OUT REPORT:    Verbal report given to [employer], RN(name) on Angella Jimenez  being transferred to S(unit) for routine progression of care       Report consisted of patients Situation, Background, Assessment and   Recommendations(SBAR). Information from the following report(s) SBAR, ED Summary, [facility] and Recent Results was reviewed with the receiving nurse. Lines:   Peripheral IV 10/18/19 Left Antecubital (Active)   Site Assessment Clean, dry, & intact 10/18/2019 12:30 PM   Phlebitis Assessment 0 10/18/2019 12:30 PM   Infiltration Assessment 0 10/18/2019 12:30 PM   Dressing Status Clean, dry, & intact 10/18/2019 12:30 PM        Opportunity for questions and clarification was provided. Patient transported with:  Patient to transport to [facility] from [facility].

## 2019-10-18 NOTE — CONSULTS
INPATIENT NEUROLOGY CONSULTATION  10/18/2019     Consulted by: Say Willis MD        Patient ID:  Kemar Bartlett  [id number]  6 y.o.  1985    CC: Possible seizure    HPI      30-year-old woman followed by [facility] neurology admitted here for possible seizure. She does not remember the event. Apparently she was confused afterwards. according to the medical record she had a convulsive event about 4 min long after dialysis today. Foaming of the mouth. No tongue biting or incontinence. she has no history of seizures she is aware of. She feels her baseline now completely. She is encountering significant stress due to the unexpected passing of her brother due to an overdose. Review of Systems   Psychiatric/Behavioral: Positive for memory loss. All other systems reviewed and are negative. Past Medical History:   Diagnosis Date    Anemia     secondary to lupus    Asthma     no inhaler use in past 2 to 3 years    Carditis     Chronic kidney disease     ESRD    Chronic pain     DDD (degenerative disc disease), lumbar     ESRD (end stage renal disease) (HCC)     GERD (gastroesophageal reflux disease)     Hemodialysis patient ([facility] [address] 12/21/2017    [address]  Tuesday,  Thursday,  and Saturday. 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     Poor historian 01/17/2018    With medications    Thromboembolus ([facility] [address] 2013    lungs    Transfusion history     Last Transfusion 12/21/2017  at [facility] [place]     Family History   Problem Relation Age of Onset    Diabetes Father     Hypertension Father     Cancer Other         aunt with breast cancer    Diabetes Mother Social History     Socioeconomic History    Marital status: SINGLE     Spouse name: Not on file    Number of children: Not on file    Years of education: Not on file    Highest education level: Not on file   Occupational History    Not on file   Social Needs    Financial resource strain: Not on file    Food insecurity:     Worry: Not on file     Inability: Not on file    Transportation needs:     Medical: Not on file     Non-medical: Not on file   Tobacco Use    Smoking status: Never Smoker    Smokeless tobacco: Never Used   Substance and Sexual Activity    Alcohol use: No    Drug use: Yes     Types: Prescription, OTC    Sexual activity: Not Currently   Lifestyle    Physical activity:     Days per week: Not on file     Minutes per session: Not on file    Stress: Not on file   Relationships    Social connections:     Talks on phone: Not on file     Gets together: Not on file     Attends Bahai service: Not on file     Active member of club or organization: Not on file     Attends meetings of clubs or organizations: Not on file     Relationship status: Not on file    Intimate partner violence:     Fear of current or ex partner: Not on file     Emotionally abused: Not on file     Physically abused: Not on file     Forced sexual activity: Not on file   Other Topics Concern     Service No    Blood Transfusions No    Caffeine Concern No    Occupational Exposure No    Hobby Hazards No    Sleep Concern No    Stress Concern No    Weight Concern No    Special Diet No    Back Care Yes     Comment: low back pain    Exercise No    Bike Helmet No    Seat Belt Yes    Self-Exams No   Social History Narrative    Lives with parents and daughter.      Current Facility-Administered Medications   Medication Dose Route Frequency    sodium chloride (NS) flush 5-40 mL  5-40 mL IntraVENous Q8H    sodium chloride (NS) flush 5-40 mL  5-40 mL IntraVENous PRN    heparin (porcine) injection 5,000 Units  5,000 Units SubCUTAneous Q8H    LORazepam (ATIVAN) injection 2 mg  2 mg IntraVENous Q2H PRN    albuterol (PROVENTIL HFA, VENTOLIN HFA, PROAIR HFA) inhaler 1-2 Puff  1-2 Puff Inhalation Q4H PRN    [START ON 10/19/2019] amLODIPine (NORVASC) tablet 10 mg  10 mg Oral DAILY    [START ON 10/19/2019] azaTHIOprine (IMURAN) tablet 50 mg  50 mg Oral DAILY AFTER BREAKFAST    carvedilol (COREG) tablet 25 mg  25 mg Oral BID WITH MEALS    [START ON 10/19/2019] hydroxychloroquine (PLAQUENIL) tablet 400 mg  400 mg Oral DAILY WITH BREAKFAST    [START ON 10/19/2019] losartan (COZAAR) tablet 100 mg  100 mg Oral DAILY    [START ON 10/19/2019] predniSONE (DELTASONE) tablet 10 mg  10 mg Oral DAILY     Current Outpatient Medications   Medication Sig    oxyCODONE-acetaminophen (PERCOCET) 5-325 mg per tablet Take 1 Tab by mouth daily as needed for Pain for up to 30 days.  amitriptyline (ELAVIL) 25 mg tablet Take 1 Tab by mouth nightly.  ELIQUIS 5 mg tablet     amitriptyline (ELAVIL) 75 mg tablet Take 1 Tab by mouth nightly for 90 days. For sleep difficulty    pantoprazole (PROTONIX) 40 mg tablet Take 1 Tab by mouth ACB/HS.  ondansetron (ZOFRAN ODT) 8 mg disintegrating tablet Take 1 Tab by mouth every eight (8) hours as needed for Nausea.  L.acidoph & parac-S.therm-Bifido (AJIT Q2/RISAQUAD-2) 16 billion cell cap cap Take 1 Cap by mouth daily.  polyethylene glycol (MIRALAX) 17 gram/dose powder Put 8 capfuls of Miralax in a 32 ounce beverage and drink it, followed by 3 capfuls twice daily for the next week and follow up with your primary care physician    bismuth subsalicylate (PEPTO-BISMOL) 262 mg/15 mL suspension Take 30 mL by mouth every four (4) hours as needed for Indigestion (diarrhea). May take 30 mL every 30 minutes as needed for up to 8 doses initially.  minoxidil (LONITEN) 2.5 mg tablet Take 2.5 mg by mouth daily.  losartan (COZAAR) 100 mg tablet Take 100 mg by mouth daily.     hydroxychloroquine (PLAQUENIL) 200 mg tablet TAKE 2 TABLETS BY MOUTH DAILY    acetaminophen (TYLENOL EXTRA STRENGTH) 500 mg tablet Take 2 Tabs by mouth every six (6) hours as needed for Pain.  ferric citrate (AURYXIA) 210 mg iron tablet Take 420 mg by mouth three (3) times daily (with meals).  carvedilol (COREG) 25 mg tablet Take 1 Tab by mouth two (2) times daily (with meals).  amLODIPine (NORVASC) 10 mg tablet Take 1 Tab by mouth daily.  predniSONE (DELTASONE) 5 mg tablet Take 2 Tabs by mouth daily.  senna (SENNA) 8.6 mg tablet Take 1 Tab by mouth daily as needed for Constipation. for constipation    gemfibrozil (LOPID) 600 mg tablet Take 300 mg by mouth daily.  azaTHIOprine (IMURAN) 50 mg tablet Take 50 mg by mouth daily (after breakfast).  albuterol (PROVENTIL HFA, VENTOLIN HFA, PROAIR HFA) 90 mcg/actuation inhaler Take 1-2 Puffs by inhalation every four (4) hours as needed for Wheezing or Shortness of Breath. Allergies   Allergen Reactions    Compazine [Prochlorperazine Edisylate] Nausea and Vomiting and Palpitations     \"hot and lightheaded\"       Visit Vitals  /82   Pulse (!) (P) 102   Temp (P) 98.8 °F (37.1 °C)   Resp (P) 14   SpO2 97%     Physical Exam   Constitutional: She appears well-developed and well-nourished. Cardiovascular: Normal rate. Pulmonary/Chest: Effort normal.   Skin: Skin is warm and dry. Psychiatric: Her behavior is normal.   Vitals reviewed. Neurologic Exam     Mental Status   Adult woman age-appropriate awake and alert following commands very well. She does not remember the event but does tell me about the recent stressors in her life. Pupils are equal, EOMI without nystagmus face is grossly symmetric tongue is midline speech is clear without aphasia  Right upper extremity with fistula in place for dialysis.   Right arm chronically weaker 4/5  Left arm bilateral legs 5/5  Sensation grossly intact  No abnormal movements  No ataxia  Gait deferred Lab Results   Component Value Date/Time    WBC 12.3 (H) 08/13/2019 07:35 AM    HGB 9.6 (L) 08/13/2019 07:35 AM    Hemoglobin (POC) 7.8 (L) 01/19/2018 12:30 PM    HCT 30.9 (L) 08/13/2019 07:35 AM    Hematocrit (POC) 42 10/18/2019 11:28 AM    PLATELET 916 41/24/3845 07:35 AM    MCV 92.2 08/13/2019 07:35 AM     Lab Results   Component Value Date/Time    Hemoglobin A1c 5.4 09/25/2015 02:02 PM    Hemoglobin A1c 5.3 03/07/2014 02:16 PM    Hemoglobin A1c 5.8 (H) 07/02/2013 04:28 PM    Glucose 82 10/18/2019 11:27 AM    Glucose (POC) 85 10/18/2019 11:28 AM    Glucose (POC) 85 10/18/2019 11:04 AM    Microalbumin/Creat ratio (mg/g creat) 1015 09/22/2009 06:30 PM    Microalb/Creat ratio (ug/mg creat.) 2,443.5 (H) 03/07/2014 02:16 PM    Microalbumin,urine random 250.86 09/22/2009 06:30 PM    LDL, calculated 60.4 09/11/2018 06:17 AM    Creatinine (POC) 4.4 (H) 10/18/2019 11:28 AM    Creatinine 4.06 (H) 10/18/2019 11:27 AM      Lab Results   Component Value Date/Time    Cholesterol, total 140 09/11/2018 06:17 AM    HDL Cholesterol 43 09/11/2018 06:17 AM    LDL, calculated 60.4 09/11/2018 06:17 AM    Triglyceride 183 (H) 09/11/2018 06:17 AM    CHOL/HDL Ratio 3.3 09/11/2018 06:17 AM     Lab Results   Component Value Date/Time    ALT (SGPT) 15 10/18/2019 11:27 AM    AST (SGOT) 28 10/18/2019 11:27 AM    Alk. phosphatase 64 10/18/2019 11:27 AM    Bilirubin, direct 0.1 11/27/2017 03:02 AM    Bilirubin, total 0.4 10/18/2019 11:27 AM    Albumin 3.6 10/18/2019 11:27 AM    Protein, total 9.2 (H) 10/18/2019 11:27 AM    INR 1.1 10/18/2019 12:26 PM    Prothrombin time 11.1 10/18/2019 12:26 PM    PLATELET 700 55/14/3755 07:35 AM    Hepatitis B surface Ag <0.10 07/31/2019 01:38 PM        CT Results (maximum last 3): Results from [place] encounter on 10/18/19   CT HEAD WO CONT    Narrative EXAM: CT HEAD WO CONT    INDICATION: new onset seizure, on eliquis, post-ictal    COMPARISON: None. CONTRAST: None.     TECHNIQUE: Unenhanced CT of the head was performed using 5 mm images. Brain and  bone windows were generated. CT dose reduction was achieved through use of a  standardized protocol tailored for this examination and automatic exposure  control for dose modulation. FINDINGS:  The ventricles and sulci are normal in size, shape and configuration and  midline. There is no significant white matter disease. There is no intracranial  hemorrhage, extra-axial collection, mass, mass effect or midline shift. The  basilar cisterns are open. No acute infarct is identified. The bone windows  demonstrate no abnormalities. The visualized portions of the paranasal sinuses  and mastoid air cells are clear. Impression IMPRESSION:    No acute intracranial abnormality   Results from Hospital Encounter encounter on 07/31/19   CT ABD PELV WO CONT    Narrative EXAM: CT ABD PELV WO CONT    INDICATION: Abdominal pain; obstruction? COMPARISON: 7/27/2019    CONTRAST:  None. TECHNIQUE:   Thin axial images were obtained through the abdomen and pelvis. Coronal and  sagittal reconstructions were generated. Oral contrast was not administered. CT  dose reduction was achieved through use of a standardized protocol tailored for  this examination and automatic exposure control for dose modulation. The absence of intravenous contrast material reduces the sensitivity for  evaluation of the solid parenchymal organs of the abdomen. FINDINGS:   LUNG BASES: Clear. INCIDENTALLY IMAGED HEART AND MEDIASTINUM: Small hiatal hernia. LIVER: No mass or biliary dilatation. GALLBLADDER: Moderately distended. No definite gallbladder wall thickening or  pericholecystic fluid. SPLEEN: No mass. PANCREAS: No mass or ductal dilatation. ADRENALS: Unremarkable. KIDNEYS/URETERS: Small atrophic bilateral kidneys. No evidence of  hydronephrosis. STOMACH: Unremarkable.   SMALL BOWEL: Multiple borderline dilated loops of small bowel with air-fluid  levels throughout. There are multiple nondistended loops of small bowel matted  together in the right lower quadrant. COLON: Moderate stool burden. No evidence of obstruction. APPENDIX: Unremarkable. PERITONEUM: No ascites or pneumoperitoneum. RETROPERITONEUM: No lymphadenopathy or aortic aneurysm. REPRODUCTIVE ORGANS: IUD in place. Hypodensity in the left ovary possibly  representing a physiologic cyst. Probable nabothian cysts in the cervix. URINARY BLADDER: No mass or calculus. BONES: No destructive bone lesion. ADDITIONAL COMMENTS: N/A      Impression IMPRESSION:  1.  Multiple loops of borderline dilated small bowel with air-fluid levels  throughout the abdomen. There is a cluster of nondistended loops of bowel in the  right lower quadrant which are matted together. These findings may represent  adhesive disease. This may represent an early partial obstruction or ileus. There is no evidence of high-grade obstruction. 2.  Small atrophic bilateral kidneys. MRI Results (maximum last 3): Results from [place] encounter on 06/11/15   MRI LUMB SPINE WO CONT    Narrative **Final Report**       ICD Codes / Adm. Diagnosis: 710.0  724.5 / Systemic lupus erythematosus (    Backache, unspecified  Examination:  MRI L SPINE WO CON  - [id number] - Jun 11 2015 12:46PM  Accession No:  [id number]  Reason:  lupus, fibromyalgia, intractable back pain, XR L-spine normal in   past      REPORT:  EXAM:  MRI L SPINE WO CON    INDICATION:  lupus, fibromyalgia, intractable back pain, XR L-spine normal   in past     COMPARISON: Radiographs of 1/6/2014    TECHNIQUE: MR imaging of the lumbar spine was performed using the following   sequences: sagittal T1, T2, STIR;  axial T1, T2.     CONTRAST:  None. FINDINGS:    There is normal alignment of the lumbar spine. Vertebral body heights are   maintained. There is T1 hypointensity of the vertebral marrow without T2   hyperintensity.  This may be due to red marrow reconversion or other systemic   process. No discrete marrow lesions are demonstrated. The conus medullaris terminates at L1-2. Signal and caliber of the distal   spinal cord are within normal limits. The paraspinal soft tissues are within normal limits. Lower thoracic spine: No herniation or stenosis. L1-L2:  No herniation or stenosis. L2-L3:  No herniation or stenosis. L3-L4:  No herniation or stenosis. L4-L5:  Mild central disc herniation. Disc desiccation with mild loss of   height of the disc. Mild facet and ligamentous hypertrophy. Mild central   spinal stenosis. No significant foraminal stenosis. L5-S1:  Is degeneration with preservation of height of the disc. Mild   central disc herniation which projects into the epidural fat. No nerve root   or thecal sac displacement or compression. Small T2 hyperintense central   annular tear. No stenosis or foraminal narrowing. IMPRESSION:  1. Mild central disc herniations L5-S1 and L4-5. Mild L4-5 central stenosis. 2. Diffuse marrow signal abnormality likely due to red marrow conversion or   chronic disease. No suspicious marrow signal abnormality or acute finding. Signing/Reading Doctor: hCrissy Jerome ([id number])    Approvedjosefina Jerome ([id number])  Jun 11 2015  1:38PM                                   VAS/US/Carotid Doppler Results (maximum last 3): Results from [place] encounter on 05/12/18   DUPLEX LOWER EXT VENOUS BILAT       PET Results (maximum last 3): No results found for this or any previous visit. Assessment and Plan        30-year-old woman on dialysis who tells me she has been compliant. She had a seizure-like event today. I have discontinued Keppra for now as this may have been provoked. Obtain EEG. MRI brain. Neurology will follow up. She will follow-up with her neurologist after discharge.      During this evaluation, we also discussed stroke education to include signs and symptoms of stroke and TIA. This clinical note was dictated with an electronic dictation software that can make unintentional errors. If there are any questions, please contact me directly for clarification.       Meryl Mac DO  NEUROLOGIST  Diplomate ABPN  10/18/2019

## 2019-10-18 NOTE — PROGRESS NOTES
SURJIT Follow-up assessment:  Outbound call made to patient today to complete SURJIT follow up assessment. Patient verified by 3 identifiers. Patient states she has rearranged her dialysis for today andtomorrow, due to passing of her brother. NN offered her condolences to her and the family. Patient says she had follow up on 10/16/19 with her Neurologist. Follow-up appointments reviewed, and medication reconciliation completed. NN contact information given for questions and concerns. No current facility-administered medications for this visit. Current Outpatient Medications:     oxyCODONE-acetaminophen (PERCOCET) 5-325 mg per tablet, Take 1 Tab by mouth daily as needed for Pain for up to 30 days. , Disp: 14 Tab, Rfl: 0    amitriptyline (ELAVIL) 25 mg tablet, Take 1 Tab by mouth nightly., Disp: 30 Tab, Rfl: 0    ELIQUIS 5 mg tablet, , Disp: , Rfl:     amitriptyline (ELAVIL) 75 mg tablet, Take 1 Tab by mouth nightly for 90 days. For sleep difficulty, Disp: 90 Tab, Rfl: 1    pantoprazole (PROTONIX) 40 mg tablet, Take 1 Tab by mouth ACB/HS. , Disp: 60 Tab, Rfl: 0    ondansetron (ZOFRAN ODT) 8 mg disintegrating tablet, Take 1 Tab by mouth every eight (8) hours as needed for Nausea., Disp: 15 Tab, Rfl: 0    L.acidoph & parac-S.therm-Bifido (AJIT Q2/RISAQUAD-2) 16 billion cell cap cap, Take 1 Cap by mouth daily. , Disp: 10 Cap, Rfl: 0    polyethylene glycol (MIRALAX) 17 gram/dose powder, Put 8 capfuls of Miralax in a 32 ounce beverage and drink it, followed by 3 capfuls twice daily for the next week and follow up with your primary care physician, Disp: 500 g, Rfl: 0    bismuth subsalicylate (PEPTO-BISMOL) 262 mg/15 mL suspension, Take 30 mL by mouth every four (4) hours as needed for Indigestion (diarrhea). May take 30 mL every 30 minutes as needed for up to 8 doses initially. , Disp: 354 mL, Rfl: 0    minoxidil (LONITEN) 2.5 mg tablet, Take 2.5 mg by mouth daily. , Disp: , Rfl:     losartan (COZAAR) 100 mg tablet, Take 100 mg by mouth daily. , Disp: , Rfl:     hydroxychloroquine (PLAQUENIL) 200 mg tablet, TAKE 2 TABLETS BY MOUTH DAILY, Disp: 180 Tab, Rfl: 6    acetaminophen (TYLENOL EXTRA STRENGTH) 500 mg tablet, Take 2 Tabs by mouth every six (6) hours as needed for Pain., Disp: 30 Tab, Rfl: 0    ferric citrate (AURYXIA) 210 mg iron tablet, Take 420 mg by mouth three (3) times daily (with meals). , Disp: , Rfl:     carvedilol (COREG) 25 mg tablet, Take 1 Tab by mouth two (2) times daily (with meals). , Disp: 60 Tab, Rfl: 0    amLODIPine (NORVASC) 10 mg tablet, Take 1 Tab by mouth daily. , Disp: 30 Tab, Rfl: 0    predniSONE (DELTASONE) 5 mg tablet, Take 2 Tabs by mouth daily. , Disp: 30 Tab, Rfl: 0    senna (SENNA) 8.6 mg tablet, Take 1 Tab by mouth daily as needed for Constipation. for constipation, Disp: , Rfl:     gemfibrozil (LOPID) 600 mg tablet, Take 300 mg by mouth daily. , Disp: , Rfl:     azaTHIOprine (IMURAN) 50 mg tablet, Take 50 mg by mouth daily (after breakfast). , Disp: , Rfl:     albuterol (PROVENTIL HFA, VENTOLIN HFA, PROAIR HFA) 90 mcg/actuation inhaler, Take 1-2 Puffs by inhalation every four (4) hours as needed for Wheezing or Shortness of Breath., Disp: 1 Inhaler, Rfl: 2    Facility-Administered Medications Ordered in Other Visits:     sodium chloride (NS) flush 5-40 mL, 5-40 mL, IntraVENous, Q8H, Benjamin Ramsey MD    sodium chloride (NS) flush 5-40 mL, 5-40 mL, IntraVENous, PRN, Benjamin Ramirez MD    levETIRAcetam (KEPPRA) 1,000 mg in 0.9% sodium chloride 100 mL IVPB, 1,000 mg, IntraVENous, Q12H, Benjamin Ramsey MD    heparin (porcine) injection 5,000 Units, 5,000 Units, SubCUTAneous, Q8H, Benjamin Ramsey MD    LORazepam (ATIVAN) injection 2 mg, 2 mg, IntraVENous, Q2H PRN, Bentley Lowe MD    Goals Addressed                 This Visit's Progress     Understands management post discharge for acute abdominal pain        · 08/15/19 [facility] [id number] for Acute abdominal pain and hyperkalemia  Reviewed red flags for abdominal pain:   · If pain becomes severe, vomiting blood or what looks like blood, pass maroon stools, you passed out, fever, notify provider immediately  · Take pain medication as ordered with tylenol and percocet  · Take ordered laxative, fluids and fiber to prevent constipation (taking opioids)  · Agrees to follow ups as ordered. Agrees to [employer] provider if:  · You feel weak and lightheaded. · Your pain becomes focused in one area of your belly. · Your belly pain is worse after you cough or move. · You have a new or higher fever. · Reviewed discharge orders, medications and follow up appointments    · Patient verbalized agreement to follow up as scheduled. Altered Bowel elimination:  · Educated patient to Identify factors (e.g., medications, bed rest, diet) that may cause or contribute to constipation. · Patient agrees to increased fluid intake, unless contraindicated. · Referral to Evaluate medication profile for gastrointestinal side effects. · Educated patient on how to keep a food diary. · Patient agrees on a high-fiber diet, as appropriate. · Instruct her on the relationship of diet, exercise, and fluid intake to  constipation and impaction. NN will follow in 7-10 days. pk  09/10/19  · Admits to compliance to follow up appointments  · Medications reconciliation completed  · Denies above red flags  · Denies issues with constipation or abdominal pain  · Admits to knowing how to contact providers of condition changes  · NN will follow again in 7-10 days. pk    09/20/19  · Admits to feeling well  · Had Neurology follow up on 9/19/19  · Continue to taper her percocet with no increase in pain  · Remain compliant with [facility]  Tuesday,  Thursday,  and Saturday  · Med reconciliation completed  · Reviewed follow up appointments. · NN will follow in 7-10 days.  pk    10/17/19  · Patient states she had follow up with Neurology on 10/16/19 for chronic pain  · Continue to take Pain medication- decreasing dose, still chronic pain in back  · Rescheduled dialysis appointment this week due to loss of brother  · Medication reconciliation completed  · Problems with insomnia-treated with Elavil  · NN will follow in 7-10 days. pk              Case management Plan:  NN will continue to attempt contacts with patient by telephone or during office visit within next 7-10 days.  Will continue to follow as necessary for the remaining 30 days post visit and will reassess for needs before discharge

## 2019-10-18 NOTE — PROGRESS NOTES
Admission Medication Reconciliation:    Information obtained from:  patient, outpatient pharmacy   RxQuery data available¹:  YES    Comments/Recommendations: Updated PTA meds/reviewed patient's allergies. 1)  Ms. Caity Huang reports getting weekly pill packs filled by [facility] ([phone]) and delivered to her at the dialysis center. They have not filled medications for her since July 19, 2019. When asked about this she said that she still has medication from [facility] and denies using other pharmacies. I discussed this with Dr. Margo Burciaga. ¹RxQuery pharmacy benefit data reflects medications filled and processed through the patient's insurance, however   this data does NOT capture whether the medication was picked up or is currently being taken by the patient. Allergies:  Compazine [prochlorperazine edisylate]    Significant PMH/Disease States:   Past Medical History:   Diagnosis Date    Anemia     secondary to lupus    Asthma     no inhaler use in past 2 to 3 years    Carditis     Chronic kidney disease     ESRD    Chronic pain     DDD (degenerative disc disease), lumbar     ESRD (end stage renal disease) ([address]     GERD (gastroesophageal reflux disease)     Hemodialysis patient ([address] 12/21/2017    [address]  Tuesday,  Thursday,  and Saturday. Hypercholesterolemia     Hypertension     Intractable nausea and vomiting 10/21/2015    Long term (current) use of anticoagulants     Lupus (systemic lupus erythematosus) (HCC)     Malignant hypertension with chronic kidney disease stage V ([address]     Peritoneal dialysis status ([address] 10/2015    x 2 years Stopped 12/2017 due to infection and removed.     Poor historian 01/17/2018    With medications    Thromboembolus ([address] 2013    lungs    Transfusion history     Last Transfusion 12/21/2017  at [facility]     Chief Complaint for this Admission:    Chief Complaint   Patient presents with    Altered mental status     Prior to Admission Medications: Prior to Admission Medications   Prescriptions Last Dose Informant Patient Reported? Taking? amLODIPine (NORVASC) 10 mg tablet Not Taking  No No   Sig: Take 1 Tab by mouth daily. amitriptyline (ELAVIL) 75 mg tablet Not Taking  No No   Sig: Take 1 Tab by mouth nightly for 90 days. For sleep difficulty   apixaban (ELIQUIS) 5 mg tablet Not Taking  Yes No   Sig: Take 5 mg by mouth every twelve (12) hours. azaTHIOprine (IMURAN) 50 mg tablet Not Taking Self Yes No   Sig: Take 50 mg by mouth daily (after breakfast). carvedilol (COREG) 25 mg tablet Not Taking  No No   Sig: Take 1 Tab by mouth two (2) times daily (with meals). ferric citrate (AURYXIA) 210 mg iron tablet Not Taking  Yes No   Sig: Take 420 mg by mouth three (3) times daily (with meals). gemfibrozil (LOPID) 600 mg tablet Not Taking Self Yes No   Sig: Take 300 mg by mouth daily. hydroxychloroquine (PLAQUENIL) 200 mg tablet Not Taking  No No   Sig: TAKE 2 TABLETS BY MOUTH DAILY   losartan (COZAAR) 100 mg tablet Not Taking  Yes No   Sig: Take 100 mg by mouth daily. minoxidil (LONITEN) 2.5 mg tablet Not Taking  Yes No   Sig: Take 5 mg by mouth daily. pantoprazole (PROTONIX) 40 mg tablet Not Taking  No No   Sig: Take 1 Tab by mouth ACB/HS. predniSONE (DELTASONE) 5 mg tablet Not Taking  No No   Sig: Take 2 Tabs by mouth daily. Facility-Administered Medications: None       Please contact the main inpatient pharmacy with any questions or concerns at [phone] and we will direct you to the clinical pharmacist covering this patient's care while in-house.    Елена Jerome, PHARMD

## 2019-10-18 NOTE — H&P
Hospitalist History and Physical  Yi Kaplan MD  Answering service: [phone] from in house phone        Date of Service:  10/18/2019  NAME:  Alysha Carnes  :  1985  MRN:  [id number]  Primary Care Provider: Suleiman Hooper NP    Chief Complaint:   Chief Complaint   Patient presents with    Altered mental status       History of Present Illness:     Alysha Carnes is a 36 y.o. female who presents with     As per initial history    The patient is a 78-year-old female with complex past medical history including chronic kidney disease on hemodialysis , lupus, insomnia, and thromboembolus on Eliquis (questionable compliance) who was brought in to her aunt in a private outside vehicle with a chief complaint of seizure-like activity. The aunt reports that patient finished her dialysis session today, they are driving home, when the patient had a 4-minute episode of convulsive activity and foaming at the mouth. This ceased spontaneously and afterward the patient was confused and tired with altered mental status. No recent trauma or falls. On notes that her brother was found  2 days ago at home thought to be due to an overdose. Patient denies overdose of ending her home medications. She also denies alcohol or drug ingestion. Currently she denies any pain. No history of seizure disorder reported. Chart review reveals that she was seen 2 days ago for insomnia and her amitriptyline was increased. As per my history, patient is back to baseline. She does not remembered the episode. She reported that it happenend for the first time. There is questionable history of true seizure. Patient reported that she takes Eliquis but as per pharmacy it was filled in July. Also talked to Dr. Yuriy Becker regarding this patient. Chart reviewed at length. Review of Systems:  Pertinent positives noted in HPI.  All other systems were reviewed and are negative. Past Medical and surgical history:   Past Medical History:   Diagnosis Date    Anemia     secondary to lupus    Asthma     no inhaler use in past 2 to 3 years    Carditis     Chronic kidney disease     ESRD    Chronic pain     DDD (degenerative disc disease), lumbar     ESRD (end stage renal disease) (HCC)     GERD (gastroesophageal reflux disease)     [facility]    [address]  Tuesday,  Thursday,  and Saturday.  Hypercholesterolemia     Hypertension     Intractable nausea and vomiting 10/21/2015    Long term (current) use of anticoagulants     Lupus (systemic lupus erythematosus) (HCC)     Malignant hypertension with chronic kidney disease stage V ([facility]     Peritoneal dialysis status ([facility] 10/2015    x 2 years Stopped 2017 due to infection and removed.  Poor historian 2018    With medications    Thromboembolus ([facility] 2013    lungs    Transfusion history     Last Transfusion 2017  at [facility]      Past Surgical History:   Procedure Laterality Date    HX  SECTION  11/2006    x1    HX OTHER SURGICAL  9/16/15    INSERTION PD CATH; Removed 2017    HX VASCULAR ACCESS Right 2017    Double-Lumen henry catheter upper chest    HX VASCULAR ACCESS Right 2018    right upper arm       Home medications:  Prior to Admission medications    Medication Sig Start Date End Date Taking? Authorizing Provider   oxyCODONE-acetaminophen (PERCOCET) 5-325 mg per tablet Take 1 Tab by mouth daily as needed for Pain for up to 30 days. 10/16/19 11/15/19  Marisela Richter MD   amitriptyline (ELAVIL) 75 mg tablet Take 1 Tab by mouth nightly for 90 days. For sleep difficulty 19  Marisela Richter MD   pantoprazole (PROTONIX) 40 mg tablet Take 1 Tab by mouth ACB/HS.  19   Rj Zheng MD   polyethylene glycol (MIRALAX) 17 gram/dose powder Put 8 capfuls of Miralax in a 32 ounce beverage and drink it, followed by 3 capfuls twice daily for the next week and follow up with your primary care physician 5/18/19   Jose Hernandez MD   minoxidil (LONITEN) 2.5 mg tablet Take 2.5 mg by mouth daily. 3/1/19   Provider, Historical   losartan (COZAAR) 100 mg tablet Take 100 mg by mouth daily. 3/1/19   Provider, Historical   hydroxychloroquine (PLAQUENIL) 200 mg tablet TAKE 2 TABLETS BY MOUTH DAILY 4/8/19   Lilian Santillan MD   ferric citrate (AURYXIA) 210 mg iron tablet Take 420 mg by mouth three (3) times daily (with meals). Provider, Historical   carvedilol (COREG) 25 mg tablet Take 1 Tab by mouth two (2) times daily (with meals). 8/19/18   Fadi Maya MD   amLODIPine (NORVASC) 10 mg tablet Take 1 Tab by mouth daily. 7/22/18   Antonio Flood MD   predniSONE (DELTASONE) 5 mg tablet Take 2 Tabs by mouth daily. 6/28/18   Lian Be MD   senna (SENNA) 8.6 mg tablet Take 1 Tab by mouth daily as needed for Constipation. for constipation    Provider, Historical   gemfibrozil (LOPID) 600 mg tablet Take 300 mg by mouth daily. 11/3/17   Provider, Historical   azaTHIOprine (IMURAN) 50 mg tablet Take 50 mg by mouth daily (after breakfast). 11/3/17   Provider, Historical       Allergies: Allergies   Allergen Reactions    Compazine [Prochlorperazine Edisylate] Nausea and Vomiting and Palpitations     \"hot and lightheaded\"       Family history:   Family History   Problem Relation Age of Onset    Diabetes Father     Hypertension Father     Cancer Other         aunt with breast cancer    Diabetes Mother         SOCIAL HISTORY:  Patient resides at Home. Patient ambulates without help. Smoking history: reports that she has never smoked. She has never used smokeless tobacco.  Drug History:  reports that she has current or past drug history. Drugs: Prescription and OTC. Alcohol history:  reports that she does not drink alcohol.     Objective:       Physical Exam:   Visit Vitals  /82   Pulse (!) (P) 102   Temp (P) 98.8 °F (37.1 °C)   Resp (P) 14   SpO2 97%     General:  Alert, cooperative, no distress, appears stated age. Head:  Normocephalic, without obvious abnormality, atraumatic. Lungs:   Clear to auscultation bilaterally. Chest wall:  No tenderness or deformity. Heart:  Regular rate and rhythm, S1, S2 normal, no murmur, click, rub or gallop. Abdomen:   Soft, non-tender. Bowel sounds normal. No masses,  No organomegaly. Extremities: Extremities normal, atraumatic, no cyanosis or edema. Pulses: 2+ and symmetric all extremities. Skin: Skin color, texture, turgor normal. No rashes or lesions. Lymph nodes: Cervical, supraclavicular, and axillary nodes normal.   Neurologic: CNII-XII intact. Normal strength, sensation and reflexes throughout. Laboratory and other diagnostic Data Review: All diagnostic labs and studies have been reviewed. Ct Head Wo Cont    Result Date: 10/18/2019  EXAM: CT HEAD WO CONT INDICATION: new onset seizure, on eliquis, post-ictal COMPARISON: None. CONTRAST: None. TECHNIQUE: Unenhanced CT of the head was performed using 5 mm images. Brain and bone windows were generated. CT dose reduction was achieved through use of a standardized protocol tailored for this examination and automatic exposure control for dose modulation. FINDINGS: The ventricles and sulci are normal in size, shape and configuration and midline. There is no significant white matter disease. There is no intracranial hemorrhage, extra-axial collection, mass, mass effect or midline shift. The basilar cisterns are open. No acute infarct is identified. The bone windows demonstrate no abnormalities. The visualized portions of the paranasal sinuses and mastoid air cells are clear. IMPRESSION: No acute intracranial abnormality    Xr Chest Port    Result Date: 10/18/2019  Indication: Altered mental status, lethargy Comparison: None Portable exam of the chest obtained at 1143 demonstrates normal heart size.  There is no acute process in the lung fields. The osseous structures are unremarkable. Impression: No acute process. Patient Vitals for the past 12 hrs:   Temp Pulse Resp BP SpO2   10/18/19 1400 -- -- -- 144/82 97 %   10/18/19 1100 (P) 98.8 °F (37.1 °C) (!) (P) 102 (P) 14 (!) (P) 153/104 (P) 92 %       No results for input(s): WBC, HGB, HCT, PLT, HGBEXT, HCTEXT, PLTEXT, HGBEXT, HCTEXT, PLTEXT in the last 72 hours. Recent Labs     10/18/19  1127   *   K 4.3   CL 96*   CO2 29   BUN 14   CREA 4.06*   GLU 82   CA 9.2   MG 2.2     Recent Labs     10/18/19  1127   SGOT 28   ALT 15   AP 64   TBILI 0.4   TP 9.2*   ALB 3.6   GLOB 5.6*     Recent Labs     10/18/19  1226   INR 1.1   PTP 11.1      No results for input(s): FE, TIBC, PSAT, FERR in the last 72 hours. Lab Results   Component Value Date/Time    Folate 13.7 06/17/2018 03:00 PM      No results for input(s): PH, PCO2, PO2 in the last 72 hours.   Recent Labs     10/18/19  1127   TROIQ <0.05     Lab Results   Component Value Date/Time    Cholesterol, total 140 09/11/2018 06:17 AM    HDL Cholesterol 43 09/11/2018 06:17 AM    LDL, calculated 60.4 09/11/2018 06:17 AM    Triglyceride 183 (H) 09/11/2018 06:17 AM    CHOL/HDL Ratio 3.3 09/11/2018 06:17 AM     Lab Results   Component Value Date/Time    Glucose (POC) 85 10/18/2019 11:28 AM    Glucose (POC) 85 10/18/2019 11:04 AM    Glucose (POC) 90 04/19/2019 06:30 AM    Glucose (POC) 84 03/05/2019 08:11 AM    Glucose (POC) 104 (H) 08/16/2018 11:06 AM    Glucose (POC) 164 (H) 07/22/2018 11:43 AM     Lab Results   Component Value Date/Time    Color YELLOW/STRAW 03/31/2019 06:07 PM    Appearance CLEAR 03/31/2019 06:07 PM    Specific gravity 1.015 03/31/2019 06:07 PM    Specific gravity 1.017 08/12/2016 09:06 PM    pH (UA) 8.5 (H) 03/31/2019 06:07 PM    Protein 100 (A) 03/31/2019 06:07 PM    Glucose NEGATIVE  03/31/2019 06:07 PM    Ketone NEGATIVE  03/31/2019 06:07 PM    Bilirubin NEGATIVE  03/31/2019 06:07 PM    Urobilinogen 0.2 03/31/2019 06:07 PM    Nitrites NEGATIVE  03/31/2019 06:07 PM    Leukocyte Esterase NEGATIVE  03/31/2019 06:07 PM    Epithelial cells MODERATE (A) 03/31/2019 06:07 PM    Bacteria NEGATIVE  03/31/2019 06:07 PM    WBC 0-4 03/31/2019 06:07 PM    RBC 0-5 03/31/2019 06:07 PM       Assessment:   Given the patient's current clinical presentation, I have a high level of concern for decompensation if discharged from the emergency department. Complex decision making was performed, which includes reviewing the patient's available past medical records, laboratory results, and x-ray films. My assessment of this patient's clinical condition and my plan of care is as follows. Active Problems:    Seizure ([address] (10/18/2019)        Plan:     1. Seizures  Questionable seizure history  Was confused as per ED   Initial imaging negative   Neuuology consulted    2. SLE  On Azathiprine  hydroxyxhloroquine    3. CKD on HD  MWF  Nephrology consulted     4. H/p PE  H/o PE as per patient 2 years ago  not sure why she is still on  [facility]. SLE? Last refilled as per pharmacy in July  Dialysis center has the records  Will try to get more information before starting eliquis. Will hold for now as per nephrology  Compliance questionable     5. HTN  On amlodipine, coreg, losartan           Diet: Regular Diet  Activity: Activity as tolerated  DVT prophylaxis: Sc heparin  Isolation precautions: NA  Consultations: Nephrology, Neurolgoy   Anticipated disposition: Home  Code status: Full Code    Admit to inpatient status as anticipated LOS is more than 2 days     Patient was explained about the risk of admission including and not a complete list including risk of falls,fractures,blood clots,allergic reactions,infections. Patient/family also understands and agrees to the treatment plan including medications and side effect profiles and also understand the risk with radiation while undergoing imaging studies.      The patient and the family/friends (after permission given by the patient to discuss) understand this and agree with the admission plan.        Signed By: Rachelle Rachel MD     10/18/19  3:45 PM        Patient's emergency contacts:  Extended Emergency Contact Information  Primary Emergency Contact: Lucy Patiño  Address: [address] [place] [id number] [address] [place] [address] Phone: [phone]  Mobile Phone: [phone]  Relation: Mother  Secondary Emergency Contact: [address] Phone: [phone]  Mobile Phone: [phone]  Relation: Father  Mother: [facility] Phone: [phone]

## 2019-10-18 NOTE — CONSULTS
[id number] consult dictated    ESRD ([facility] [address] unit; MWF; used to be TTS based on prior admit)  HTN  SLE  Hx of SBO  Hx of PE- on Eliquis. ?compliance. She has been taking it as per outpt HD unit  SHPT  Seizure    P:  Next HD on Monday.  Pt had full HD Rx earlier today  Ct home BP meds  Hold EPO (POC Hct is 42)  Add on phos to labs done today  Rx of Sz per 1ry team  Can resume Eliquis at 5 mg BID    Phyllis Trujillo MD  [facility]

## 2019-10-18 NOTE — ED TRIAGE NOTES
Triage Note: Patient arrives by private vehicle with her aunt for altered mental status, lethargy. Patient's aunt reports she picked her up from dialysis this morning and while riding home she began shaking and \"went out, wouldn't respond to me. \"  Patient presents reporting \"I feel weak,\" with frothy white noted around her mouth, easily falls asleep between talking with RN. Patient denies any pain. Per patient's aunt, the patient's brother \" yesterday,\" and she skipped dialysis on Wednesday and had dialysis on Thursday and Friday this week.

## 2019-10-18 NOTE — ED PROVIDER NOTES
The patient is a 81-year-old female with complex past medical history including chronic kidney disease on hemodialysis , lupus, insomnia, and thromboembolus on Eliquis who was brought in to her aunt in a private outside vehicle with a chief complaint of seizure-like activity. The aunt reports that patient finished her dialysis session today, they are driving home, when the patient had a 4-minute episode of convulsive activity and foaming at the mouth. This ceased spontaneously and afterward the patient was confused and tired with altered mental status. No recent trauma or falls. On notes that her brother was found  2 days ago at home thought to be due to an overdose. Patient denies overdose of ending her home medications. She also denies alcohol or drug ingestion. Currently she denies any pain. No history of seizure disorder reported. Chart review reveals that she was seen 2 days ago for insomnia and her amitriptyline was increased. Altered mental status           Past Medical History:   Diagnosis Date    Anemia     secondary to lupus    Asthma     no inhaler use in past 2 to 3 years    Carditis     Chronic kidney disease     ESRD    Chronic pain     DDD (degenerative disc disease), lumbar     ESRD (end stage renal disease) (HCC)     GERD (gastroesophageal reflux disease)     Hemodialysis patient ([facility] [address] 2017    [address]  Tuesday,  Thursday,  and Saturday.  Hypercholesterolemia     Hypertension     Intractable nausea and vomiting 10/21/2015    Long term (current) use of anticoagulants     Lupus (systemic lupus erythematosus) (HCC)     Malignant hypertension with chronic kidney disease stage V ([facility] [facility]     Peritoneal dialysis status ([facility] [address] [facility] 10/2015    x 2 years Stopped 2017 due to infection and removed.     Poor historian 2018    With medications    Thromboembolus [facility]) 2013    lungs    Transfusion history     Last Transfusion 2017  at [facility]       Past Surgical History:   Procedure Laterality Date    HX  SECTION  11/2006    x1    HX OTHER SURGICAL  9/16/15    INSERTION PD CATH;  Removed 2017    HX VASCULAR ACCESS Right 2017    Double-Lumen henry catheter upper chest    HX VASCULAR ACCESS Right 2018    right upper arm         Family History:   Problem Relation Age of Onset    Diabetes Father     Hypertension Father     Cancer Other         aunt with breast cancer    Diabetes Mother        Social History     Socioeconomic History    Marital status: SINGLE     Spouse name: Not on file    Number of children: Not on file    Years of education: Not on file    Highest education level: Not on file   Occupational History    Not on file   Social Needs    Financial resource strain: Not on file    Food insecurity:     Worry: Not on file     Inability: Not on file    Transportation needs:     Medical: Not on file     Non-medical: Not on file   Tobacco Use    Smoking status: Never Smoker    Smokeless tobacco: Never Used   Substance and Sexual Activity    Alcohol use: No    Drug use: Yes     Types: Prescription, OTC    Sexual activity: Not Currently   Lifestyle    Physical activity:     Days per week: Not on file     Minutes per session: Not on file    Stress: Not on file   Relationships    Social connections:     Talks on phone: Not on file     Gets together: Not on file     Attends [employer] service: Not on file     Active member of club or organization: Not on file     Attends meetings of clubs or organizations: Not on file     Relationship status: Not on file    Intimate partner violence:     Fear of current or ex partner: Not on file     Emotionally abused: Not on file     Physically abused: Not on file     Forced sexual activity: Not on file   Other Topics Concern     Service No    Blood Transfusions No    Caffeine Concern No    Occupational Exposure No    Hobby Hazards No    Sleep Concern No    Stress Concern No    Weight Concern No    Special Diet No    Back Care Yes     Comment: low back pain    Exercise No    Bike Helmet No    Seat Belt Yes    Self-Exams No   Social History Narrative    Lives with parents and daughter. ALLERGIES: Compazine [prochlorperazine edisylate]    Review of Systems   Unable to perform ROS: Acuity of condition (Patient appears postictal)       There were no vitals filed for this visit. Physical Exam   Constitutional: She is oriented to person, place, and time. She appears well-developed. No distress. HENT:   Head: Normocephalic and atraumatic. Eyes: Conjunctivae and EOM are normal.   Neck: Normal range of motion. Neck supple. Cardiovascular: Normal rate, regular rhythm and normal heart sounds. Pulmonary/Chest: Effort normal and breath sounds normal. No respiratory distress. Abdominal: Soft. There is no tenderness. There is no guarding. Musculoskeletal: Normal range of motion. She exhibits no edema or tenderness. Right upper extremity fistula with palpable thrill audible bruit. Neurological: She is alert and oriented to person, place, and time. She exhibits normal muscle tone. Somnolent but easily awakens with verbal question, follows directions, moving all extremities, speech clear. Skin: Skin is warm and dry. MDM       Procedures    EKG interpretation: 11:46AM  Rhythm: normal sinus rhythm; and regular . Rate (approx.): 98; Axis: normal; Intervals: prolonged QTc at 528 ms; ST/T wave: non-specific changes, no STEMI; increase in QTC by 20 ms otherwise no signficant changes c/w 7/31/2019; EKG documented and interpreted by Sarai Dai MD, ED MD. Brielle Herrera for Admission  1:48 PM    ED Room Number: ER22/22  Patient Name and age:  Sarina Alaniz 36 y.o.  female  Working Diagnosis:   1. Witnessed seizure-like activity ([address]    2.  Prolonged Q-T interval on ECG    3. Post-ictal confusion Readmission: no  Isolation Requirements:  no  Recommended Level of Care:  telemetry  Code Status:  Full Code  Department:[facility] Adult ED - [phone]

## 2019-10-19 LAB
ALBUMIN SERPL-MCNC: 3.2 G/DL (ref 3.5–5)
ALBUMIN/GLOB SERPL: 0.7 {RATIO} (ref 1.1–2.2)
ALP SERPL-CCNC: 56 U/L (ref 45–117)
ALT SERPL-CCNC: 11 U/L (ref 12–78)
ANION GAP SERPL CALC-SCNC: 8 MMOL/L (ref 5–15)
AST SERPL-CCNC: 16 U/L (ref 15–37)
ATRIAL RATE: 98 BPM
BASOPHILS # BLD: 0 K/UL (ref 0–0.1)
BASOPHILS NFR BLD: 1 % (ref 0–1)
BILIRUB SERPL-MCNC: 0.4 MG/DL (ref 0.2–1)
BUN SERPL-MCNC: 27 MG/DL (ref 6–20)
BUN/CREAT SERPL: 4 (ref 12–20)
CALCIUM SERPL-MCNC: 8.5 MG/DL (ref 8.5–10.1)
CALCULATED P AXIS, ECG09: 48 DEGREES
CALCULATED R AXIS, ECG10: 57 DEGREES
CALCULATED T AXIS, ECG11: 41 DEGREES
CHLORIDE SERPL-SCNC: 96 MMOL/L (ref 97–108)
CO2 SERPL-SCNC: 31 MMOL/L (ref 21–32)
CREAT SERPL-MCNC: 6.01 MG/DL (ref 0.55–1.02)
DIAGNOSIS, 93000: NORMAL
DIFFERENTIAL METHOD BLD: ABNORMAL
EOSINOPHIL # BLD: 0.1 K/UL (ref 0–0.4)
EOSINOPHIL NFR BLD: 3 % (ref 0–7)
ERYTHROCYTE [DISTWIDTH] IN BLOOD BY AUTOMATED COUNT: 14.9 % (ref 11.5–14.5)
GLOBULIN SER CALC-MCNC: 4.7 G/DL (ref 2–4)
GLUCOSE SERPL-MCNC: 89 MG/DL (ref 65–100)
HCT VFR BLD AUTO: 35.5 % (ref 35–47)
HGB BLD-MCNC: 10.8 G/DL (ref 11.5–16)
IMM GRANULOCYTES # BLD AUTO: 0 K/UL (ref 0–0.04)
IMM GRANULOCYTES NFR BLD AUTO: 0 % (ref 0–0.5)
LYMPHOCYTES # BLD: 0.5 K/UL (ref 0.8–3.5)
LYMPHOCYTES NFR BLD: 18 % (ref 12–49)
MCH RBC QN AUTO: 27.6 PG (ref 26–34)
MCHC RBC AUTO-ENTMCNC: 30.4 G/DL (ref 30–36.5)
MCV RBC AUTO: 90.8 FL (ref 80–99)
MONOCYTES # BLD: 0.3 K/UL (ref 0–1)
MONOCYTES NFR BLD: 10 % (ref 5–13)
NEUTS SEG # BLD: 1.9 K/UL (ref 1.8–8)
NEUTS SEG NFR BLD: 68 % (ref 32–75)
NRBC # BLD: 0 K/UL (ref 0–0.01)
NRBC BLD-RTO: 0 PER 100 WBC
P-R INTERVAL, ECG05: 176 MS
PLATELET # BLD AUTO: 145 K/UL (ref 150–400)
PMV BLD AUTO: 11.2 FL (ref 8.9–12.9)
POTASSIUM SERPL-SCNC: 4.2 MMOL/L (ref 3.5–5.1)
PROT SERPL-MCNC: 7.9 G/DL (ref 6.4–8.2)
Q-T INTERVAL, ECG07: 414 MS
QRS DURATION, ECG06: 108 MS
QTC CALCULATION (BEZET), ECG08: 528 MS
RBC # BLD AUTO: 3.91 M/UL (ref 3.8–5.2)
RBC MORPH BLD: ABNORMAL
SODIUM SERPL-SCNC: 135 MMOL/L (ref 136–145)
VENTRICULAR RATE, ECG03: 98 BPM
WBC # BLD AUTO: 2.8 K/UL (ref 3.6–11)

## 2019-10-19 PROCEDURE — 80053 COMPREHEN METABOLIC PANEL: CPT

## 2019-10-19 PROCEDURE — 85025 COMPLETE CBC W/AUTO DIFF WBC: CPT

## 2019-10-19 PROCEDURE — 74011250637 HC RX REV CODE- 250/637: Performed by: INTERNAL MEDICINE

## 2019-10-19 PROCEDURE — 74011250636 HC RX REV CODE- 250/636: Performed by: NURSE PRACTITIONER

## 2019-10-19 PROCEDURE — 74011000250 HC RX REV CODE- 250: Performed by: NURSE PRACTITIONER

## 2019-10-19 PROCEDURE — 74011250636 HC RX REV CODE- 250/636: Performed by: INTERNAL MEDICINE

## 2019-10-19 PROCEDURE — 74011636637 HC RX REV CODE- 636/637: Performed by: INTERNAL MEDICINE

## 2019-10-19 PROCEDURE — 65660000000 HC RM CCU STEPDOWN

## 2019-10-19 PROCEDURE — 36415 COLL VENOUS BLD VENIPUNCTURE: CPT

## 2019-10-19 RX ORDER — ACETAMINOPHEN 325 MG/1
650 TABLET ORAL
Status: DISCONTINUED | OUTPATIENT
Start: 2019-10-19 | End: 2019-10-20 | Stop reason: HOSPADM

## 2019-10-19 RX ADMIN — CARVEDILOL 25 MG: 12.5 TABLET, FILM COATED ORAL at 17:36

## 2019-10-19 RX ADMIN — AZATHIOPRINE 50 MG: 50 TABLET ORAL at 10:11

## 2019-10-19 RX ADMIN — APIXABAN 5 MG: 5 TABLET, FILM COATED ORAL at 09:27

## 2019-10-19 RX ADMIN — LOSARTAN POTASSIUM 100 MG: 50 TABLET ORAL at 09:28

## 2019-10-19 RX ADMIN — Medication 10 ML: at 21:12

## 2019-10-19 RX ADMIN — ACETAMINOPHEN 650 MG: 325 TABLET, FILM COATED ORAL at 21:11

## 2019-10-19 RX ADMIN — Medication 5 ML: at 14:00

## 2019-10-19 RX ADMIN — AMLODIPINE BESYLATE 10 MG: 5 TABLET ORAL at 09:28

## 2019-10-19 RX ADMIN — APIXABAN 5 MG: 5 TABLET, FILM COATED ORAL at 17:36

## 2019-10-19 RX ADMIN — ACETAMINOPHEN 650 MG: 325 TABLET, FILM COATED ORAL at 09:50

## 2019-10-19 RX ADMIN — HEPARIN SODIUM 5000 UNITS: 5000 INJECTION INTRAVENOUS; SUBCUTANEOUS at 05:41

## 2019-10-19 RX ADMIN — HYDROXYCHLOROQUINE SULFATE 400 MG: 200 TABLET, FILM COATED ORAL at 09:27

## 2019-10-19 RX ADMIN — FAMOTIDINE 20 MG: 10 INJECTION, SOLUTION INTRAVENOUS at 21:12

## 2019-10-19 RX ADMIN — Medication 10 ML: at 06:00

## 2019-10-19 RX ADMIN — PREDNISONE 10 MG: 10 TABLET ORAL at 09:27

## 2019-10-19 RX ADMIN — CARVEDILOL 25 MG: 12.5 TABLET, FILM COATED ORAL at 09:27

## 2019-10-19 NOTE — PHYSICIAN ADVISORY
[facility]     Physician Advisor Recommendation      The information in this document is a recommendation to be used for utilization review and utilization management purposes only. This recommendation is not an order. The recommendation is made based on the information reviewed at the time of the referral, is pursuant to the [place] [facility] Conditions of Participation (42 CFR Part 482), and is neither a judgment nor an assessment with regard to the appropriateness or quality of clinical care. Nothing in this document may be used to limit, alter, or affect clinical services provided to the patient named below. The provider of services is ultimately responsible for the submission of a claim that has met all requirements for correct coding, billing, and reimbursement. Letter of Status Determination: Current Status   INPATIENT is Appropriate         Pt Name:  Quinton Arceo   MR#  [id number]   [facility]#   [id number]   [address] [place], [place] [address]/81  @ [facility]   Hospitalization date  10/18/2019 10:59 AM   Current Attending Physician  Fabian Raymundo MD   Principal diagnosis  Seizures new   Clinicals  36 y.o. female hospitalized with new seizures. The patient is a 27-year-old [facility] female with past medical history significant for ESRD, on HD; lupus; history of PE, on Eliquis who was brought in by her aunt in a private vehicle with chief complaint of seizure-like activity. She was at her dialysis center today, finished her treatment and was driving on her way home when she had few minutes of convulsive activity and foaming at the mouth. Subsequently, the patient was confused and was brought into ED. She denied any recent trauma or falls.   Her brother was recently found  two days ago at home thought to be due to an overdose  Monitor her neurological status  Assess her meds and adjusting them   Evaluate her labs, will need to Dialysis tomorrow  Treatment to be continued with NEPHROLOGY and Neurology input. [facility] criteria   Does  NOT apply    STATUS DETERMINATION  On the basis of clinical data, available documentation, we believe that the current status of this patient as INPATIENT is Appropriate On the basis of above clinical data, this patient's care in acute hospital care setting is expected to exceed two midnights. Additional comments     Insurance  [place]or: [place] MED[place] / Plan: [place] MEDICARE PART A & B / Product Type: Medicare /      No current facility-administered medications on file prior to encounter. Current Outpatient Medications on File Prior to Encounter   Medication Sig Dispense Refill    apixaban (ELIQUIS) 5 mg tablet Take 5 mg by mouth every twelve (12) hours.  amitriptyline (ELAVIL) 75 mg tablet Take 1 Tab by mouth nightly for 90 days. For sleep difficulty 90 Tab 1    pantoprazole (PROTONIX) 40 mg tablet Take 1 Tab by mouth ACB/HS. 60 Tab 0    minoxidil (LONITEN) 2.5 mg tablet Take 5 mg by mouth daily.  losartan (COZAAR) 100 mg tablet Take 100 mg by mouth daily.  hydroxychloroquine (PLAQUENIL) 200 mg tablet TAKE 2 TABLETS BY MOUTH DAILY 180 Tab 6    ferric citrate (AURYXIA) 210 mg iron tablet Take 420 mg by mouth three (3) times daily (with meals).  carvedilol (COREG) 25 mg tablet Take 1 Tab by mouth two (2) times daily (with meals). 60 Tab 0    amLODIPine (NORVASC) 10 mg tablet Take 1 Tab by mouth daily. 30 Tab 0    predniSONE (DELTASONE) 5 mg tablet Take 2 Tabs by mouth daily. 30 Tab 0    gemfibrozil (LOPID) 600 mg tablet Take 300 mg by mouth daily.  azaTHIOprine (IMURAN) 50 mg tablet Take 50 mg by mouth daily (after breakfast). 12:17 PM 10/19/2019       Dr. Raj Gillis [facility]   Physician Joseph Joshi, [address] [place] [address]  C: [phone]  [email or web address]

## 2019-10-19 NOTE — PROCEDURES
ELECTROENCEPHALOGRAM REPORT    HISTORY: Patient is a 36-year-old female who is being evaluated for altered  mental status, seizure activity. DESCRIPTION: This is an 18-channel EEG performed on a confused  patient. The dominant posterior background rhythm consists of  medium-voltage rhythms in the 7 Hz frequency range out of the posterior  head region. Photic stimulation does not elicit a   driving response. Hyperventilation was not performed. ELECTROENCEPHALOGRAM SUMMARY: Mildly abnormal EEG due to mild slowing of  the background rhythms. CLINICAL INTERPRETATION: This EEG is suggestive of some mild generalized  encephalopathic process, nonspecific in type. No clearly lateralizing or epileptiform features were seen. Please correlate clinically.     Dinesh Mojica DO  10/19/19

## 2019-10-19 NOTE — PROGRESS NOTES
Problem: Seizure Disorder (Adult)  Goal: *STG: Remains free of seizure activity  Outcome: Progressing Towards Goal  Goal: *STG: Remains safe in hospital  Outcome: Progressing Towards Goal     Problem: Pain  Goal: *Control of Pain  Outcome: Progressing Towards Goal     Problem: Falls - Risk of  Goal: *Absence of Falls  Description  Document Kevinricardoaicha Liao Fall Risk and appropriate interventions in the flowsheet.   Outcome: Progressing Towards Goal  Note:   Fall Risk Interventions:

## 2019-10-19 NOTE — PROGRESS NOTES
Hospitalist Progress Note  Chan Lew MD  Answering service: [phone] OR [phone] from in house phone        Date of Service:  10/19/2019  NAME:  Sheri Simon  :  1985  MRN:  [id number]  PCP: David Martinez NP    Chief Complaint:   Chief Complaint   Patient presents with    Altered mental status         Admission Summary:     Sheri Simon is a 36 y.o. female who presented with  s per initial history     The patient is a 78-year-old female with complex past medical history including chronic kidney disease on hemodialysis , lupus, insomnia, and thromboembolus on Eliquis (questionable compliance) who was brought in to her aunt in a private outside vehicle with a chief complaint of seizure-like activity.  The aunt reports that patient finished her dialysis session today, they are driving home, when the patient had a 4-minute episode of convulsive activity and foaming at the mouth.  This ceased spontaneously and afterward the patient was confused and tired with altered mental status.  No recent trauma or falls.  On notes that her brother was found  2 days ago at home thought to be due to an overdose. Jason Garcia denies overdose of ending her home medications.  She also denies alcohol or drug ingestion.  Currently she denies any pain.  No history of seizure disorder reported.  Chart review reveals that she was seen 2 days ago for insomnia and her amitriptyline was increased.      As per my history, patient is back to baseline. She does not remembered the episode. She reported that it happenend for the first time. There is questionable history of true seizure. Patient reported that she takes Eliquis but as per pharmacy it was filled in July. Also talked to Dr. Carmencita Brown regarding this patient. Interval history / Subjective:   Patient seen for Follow up of Seizures  Feeling better, no active issues. Assessment & Plan:     1. Seizures  Questionable seizure history  Was confused as per ED   Initial imaging negative   Neuuology consulted, will appreciate recommendations, Echo pending   Will discharge once cleared by neurology      2. SLE  On Azathiprine  hydroxyxhloroquine     3. CKD on HD  MWF  Nephrology consulted      4. H/p PE  H/o PE as per patient 2 years ago  not sure why she is still on  [facility]. SLE? Last refilled as per pharmacy in July  Dialysis center has the records  Will try to get more information before starting eliquis. Will hold for now as per nephrology  Compliance questionable      5. HTN  On amlodipine, coreg, losartan    Code status: Full Code    DVT prophylaxis: SC heparin     Care Plan discussed with: Patient/Family    Disposition: Home w/Family and TBD    Hospital Problems  Date Reviewed: 7/31/2019          Codes Class Noted POA    Seizure ([facility] [address] [address] ICD-10-CM: R56.9  ICD-9-CM: 780.39  10/18/2019 Unknown                Review of Systems:   A comprehensive review of systems was negative except for that written in the HPI. Physical Examination:     Visit Vitals  /75 (BP 1 Location: Left arm, BP Patient Position: At rest)   Pulse 80   Temp 98.3 °F (36.8 °C)   Resp 21   Wt 83.8 kg (184 lb 11.2 oz)   SpO2 95%   BMI 30.74 kg/m²     General:  Alert, cooperative, no distress, appears stated age. Head:  Normocephalic, without obvious abnormality, atraumatic. Lungs:   Clear to auscultation bilaterally. Chest wall:  No tenderness or deformity. Heart:  Regular rate and rhythm, S1, S2 normal, no murmur, click, rub or gallop. Abdomen:   Soft, non-tender. Bowel sounds normal. No masses,  No organomegaly. Extremities: Extremities normal, atraumatic, no cyanosis or edema. Pulses: 2+ and symmetric all extremities. Skin: Skin color, texture, turgor normal. No rashes or lesions. Lymph nodes: Cervical, supraclavicular, and axillary nodes normal.   Neurologic: CNII-XII intact.  Normal strength, sensation and reflexes throughout. Vital Signs:    Last 24hrs VS reviewed since prior progress note. Most recent are:    Visit Vitals  /75 (BP 1 Location: Left arm, BP Patient Position: At rest)   Pulse 80   Temp 98.3 °F (36.8 °C)   Resp 21   Wt 83.8 kg (184 lb 11.2 oz)   SpO2 95%   BMI 30.74 kg/m²       No intake or output data in the 24 hours ending 10/19/19 1514     Tmax:  Temp (24hrs), Av.4 °F (36.9 °C), Min:98 °F (36.7 °C), Max:99.3 °F (37.4 °C)      Data Review:   Data reviewed by myself:  Mri Brain Wo Cont    Result Date: 10/19/2019  *PRELIMINARY REPORT* MRI examination of the brain demonstrates no acute findings. Preliminary report was provided by Dr. Janelle Campa, the on-call radiologist, at 9:33 PM Final report to follow. *END PRELIMINARY REPO* EXAM:  MRI BRAIN WO CONT INDICATION:  Seizure. COMPARISON: MRI brain 2005. CT head from the same day. TECHNIQUE: Sagittal T1; coronal T2; axial T1, T2, FLAIR, gradient-echo, and diffusion weighted noncontrast MRI of the brain. FINDINGS: The ventricles and sulci are normal in size and configuration. No edema, midline shift, mass effect, or hemorrhage. No evidence of acute infarct. Medial temporal lobes are symmetric. Major intracranial flow voids are present. IMPRESSION: No evidence of hemorrhage or acute infarct. No mass or edema. Ct Head Wo Cont    Result Date: 10/18/2019  EXAM: CT HEAD WO CONT INDICATION: new onset seizure, on eliquis, post-ictal COMPARISON: None. CONTRAST: None. TECHNIQUE: Unenhanced CT of the head was performed using 5 mm images. Brain and bone windows were generated. CT dose reduction was achieved through use of a standardized protocol tailored for this examination and automatic exposure control for dose modulation. FINDINGS: The ventricles and sulci are normal in size, shape and configuration and midline. There is no significant white matter disease.  There is no intracranial hemorrhage, extra-axial collection, mass, mass effect or midline shift. The basilar cisterns are open. No acute infarct is identified. The bone windows demonstrate no abnormalities. The visualized portions of the paranasal sinuses and mastoid air cells are clear. IMPRESSION: No acute intracranial abnormality    Xr Chest Port    Result Date: 10/18/2019  Indication: Altered mental status, lethargy Comparison: None Portable exam of the chest obtained at 1143 demonstrates normal heart size. There is no acute process in the lung fields. The osseous structures are unremarkable. Impression: No acute process. Lab Results   Component Value Date/Time    Specimen Description: URINE 08/28/2013 07:34 PM    Specimen Description: URINE 04/26/2013 11:12 AM    Specimen Description: NARES 03/09/2013 01:30 PM     Lab Results   Component Value Date/Time    Culture result: NO GROWTH 5 DAYS 07/15/2019 05:50 PM    Culture result: MRSA NOT PRESENT 03/05/2019 04:06 AM    Culture result:  03/05/2019 04:06 AM         Screening of patient nares for MRSA is for surveillance purposes and, if positive, to facilitate isolation considerations in high risk settings. It is not intended for automatic decolonization interventions per se as regimens are not sufficiently effective to warrant routine use. All Micro Results     None          Labs: reviewed by myself. Recent Labs     10/19/19  0230   WBC 2.8*   HGB 10.8*   HCT 35.5   *     Recent Labs     10/19/19  0230 10/18/19  1127   * 133*   K 4.2 4.3   CL 96* 96*   CO2 31 29   BUN 27* 14   CREA 6.01* 4.06*   GLU 89 82   CA 8.5 9.2   MG  --  2.2     Recent Labs     10/19/19  0230 10/18/19  1127   SGOT 16 28   ALT 11* 15   AP 56 64   TBILI 0.4 0.4   TP 7.9 9.2*   ALB 3.2* 3.6   GLOB 4.7* 5.6*     Recent Labs     10/18/19  1226   INR 1.1   PTP 11.1      No results for input(s): FE, TIBC, PSAT, FERR in the last 72 hours.    Lab Results   Component Value Date/Time    Folate 13.7 06/17/2018 03:00 PM      No results for input(s): PH, PCO2, PO2 in the last 72 hours.   Recent Labs     10/18/19  1127   TROIQ <0.05     Lab Results   Component Value Date/Time    Cholesterol, total 140 09/11/2018 06:17 AM    HDL Cholesterol 43 09/11/2018 06:17 AM    LDL, calculated 60.4 09/11/2018 06:17 AM    Triglyceride 183 (H) 09/11/2018 06:17 AM    CHOL/HDL Ratio 3.3 09/11/2018 06:17 AM     Lab Results   Component Value Date/Time    Glucose (POC) 85 10/18/2019 11:28 AM    Glucose (POC) 85 10/18/2019 11:04 AM    Glucose (POC) 90 04/19/2019 06:30 AM    Glucose (POC) 84 03/05/2019 08:11 AM    Glucose (POC) 104 (H) 08/16/2018 11:06 AM    Glucose (POC) 164 (H) 07/22/2018 11:43 AM     Lab Results   Component Value Date/Time    Color YELLOW/STRAW 03/31/2019 06:07 PM    Appearance CLEAR 03/31/2019 06:07 PM    Specific gravity 1.015 03/31/2019 06:07 PM    Specific gravity 1.017 08/12/2016 09:06 PM    pH (UA) 8.5 (H) 03/31/2019 06:07 PM    Protein 100 (A) 03/31/2019 06:07 PM    Glucose NEGATIVE  03/31/2019 06:07 PM    Ketone NEGATIVE  03/31/2019 06:07 PM    Bilirubin NEGATIVE  03/31/2019 06:07 PM    Urobilinogen 0.2 03/31/2019 06:07 PM    Nitrites NEGATIVE  03/31/2019 06:07 PM    Leukocyte Esterase NEGATIVE  03/31/2019 06:07 PM    Epithelial cells MODERATE (A) 03/31/2019 06:07 PM    Bacteria NEGATIVE  03/31/2019 06:07 PM    WBC 0-4 03/31/2019 06:07 PM    RBC 0-5 03/31/2019 06:07 PM         Medications Reviewed:     Current Facility-Administered Medications   Medication Dose Route Frequency    acetaminophen (TYLENOL) tablet 650 mg  650 mg Oral Q4H PRN    sodium chloride (NS) flush 5-40 mL  5-40 mL IntraVENous Q8H    sodium chloride (NS) flush 5-40 mL  5-40 mL IntraVENous PRN    LORazepam (ATIVAN) injection 2 mg  2 mg IntraVENous Q2H PRN    amLODIPine (NORVASC) tablet 10 mg  10 mg Oral DAILY    azaTHIOprine (IMURAN) tablet 50 mg  50 mg Oral DAILY AFTER BREAKFAST    carvedilol (COREG) tablet 25 mg  25 mg Oral BID WITH MEALS    hydroxychloroquine (PLAQUENIL) tablet 400 mg  400 mg Oral DAILY WITH BREAKFAST    losartan (COZAAR) tablet 100 mg  100 mg Oral DAILY    predniSONE (DELTASONE) tablet 10 mg  10 mg Oral DAILY    albuterol (PROVENTIL VENTOLIN) nebulizer solution 2.5 mg  2.5 mg Nebulization Q4H PRN    apixaban (ELIQUIS) tablet 5 mg  5 mg Oral BID     ______________________________________________________________________  EXPECTED LENGTH OF STAY: - - -  ACTUAL LENGTH OF STAY:          1                 Ignacio Coyne MD     Patient's emergency contacts:  Extended Emergency Contact Information  Primary Emergency Contact: Kiana Hernandez  Address: [address] [place] [id number] [facility] Phone: [phone]  Mobile Phone: [phone]  Relation: Mother  Secondary Emergency Contact: [address] Phone: [phone]  Mobile Phone: [phone]  Relation: Father  Mother: Ashley Collins Phone: [phone]

## 2019-10-19 NOTE — CONSULTS
[address]    Name:  Siena Ruvalcaba  MR#:  [id number]  :  1985  ACCOUNT #:  [de-identified]  DATE OF SERVICE:  10/18/2019    REQUESTING PHYSICIAN:  Corrine Khan MD    REASON FOR CONSULTATION:  Evaluation and management of ESRD. The patient was seen and examined in ED. History obtained from chart review and discussion with Dr. Soledad Dyer. The patient is currently sedated and unable to provide much history. HISTORY OF PRESENT ILLNESS:  The patient is a 30-year-old [facility] American female with past medical history significant for ESRD, on HD; lupus; history of PE, on Eliquis who was brought in by her aunt in a private vehicle with chief complaint of seizure-like activity. She was at her dialysis center today, finished her treatment and was driving on her way home when she had few minutes of convulsive activity and foaming at the mouth. Subsequently, the patient was confused and was brought into ED. She denied any recent trauma or falls. Her brother was recently found  two days ago at home thought to be due to an overdose. The patient denied any overdose earlier. She had treatment with Ativan earlier. She is currently not showing any seizure-like activity. She is sleepy and barely arousable. Unable to provide any history. She is on Eliquis for her PE. She used to be on TTS schedule but has been looks like switched to Monday, Wednesday, and Friday schedule. REVIEW OF SYSTEMS:  Deferred. Past Medical History:   Diagnosis Date    Anemia     secondary to lupus    Asthma     no inhaler use in past 2 to 3 years    Carditis     Chronic kidney disease     ESRD    Chronic pain     DDD (degenerative disc disease), lumbar     [facility] stage [facility]     GERD (gastroesophageal reflux disease)     Hemodialysis patient ([facility] [place] [facility] 2017    [address]  Tuesday,  Thursday,  and Saturday.      Hypercholesterolemia     Hypertension     Intractable nausea and vomiting 10/21/2015    Long term (current) use of anticoagulants     Lupus (systemic lupus erythematosus) (HCC)     Malignant hypertension with chronic kidney disease stage V ([facility]     Peritoneal dialysis status ([facility] 10/2015    x 2 years Stopped 12/2017 due to infection and removed.  Poor historian 01/17/2018    With medications    Thromboembolus ([facility] 2013    lungs    Transfusion history     Last Transfusion 12/21/2017  at [address] [place] [address]     Allergies   Allergen Reactions    Compazine [Prochlorperazine Edisylate] Nausea and Vomiting and Palpitations     \"hot and lightheaded\"     Medication Documentation Review Audit     Reviewed by JONATHAN CabreraD (Pharmacist) on 10/18/19 at 1602    Medication Sig Documenting Provider Last Dose Status Taking?   amitriptyline (ELAVIL) 75 mg tablet Take 1 Tab by mouth nightly for 90 days. For sleep difficulty Namita Ruelas MD Not Taking Active No   amLODIPine (NORVASC) 10 mg tablet Take 1 Tab by mouth daily. Awilda Santiago MD Not Taking Active No   apixaban (ELIQUIS) 5 mg tablet Take 5 mg by mouth every twelve (12) hours. Provider, Historical Not Taking Active No   azaTHIOprine (IMURAN) 50 mg tablet Take 50 mg by mouth daily (after breakfast). Provider, Historical Not Taking Active No           Med Note (Kwabena Hailee   Wed Dec 13, 2017 11:51 AM)     carvedilol (COREG) 25 mg tablet Take 1 Tab by mouth two (2) times daily (with meals). Donta Perez MD Not Taking Active No   ferric citrate (AURYXIA) 210 mg iron tablet Take 420 mg by mouth three (3) times daily (with meals). Provider, Historical Not Taking Active No   gemfibrozil (LOPID) 600 mg tablet Take 300 mg by mouth daily.  Provider, Historical Not Taking Active No           Med Note (Noralee Calender   Mon Mar 12, 2018  8:05 AM)     hydroxychloroquine (PLAQUENIL) 200 mg tablet TAKE 2 TABLETS BY MOUTH DAILY Naima Rodriguez MD Not Taking Active No   losartan (COZAAR) 100 mg tablet Take 100 mg by mouth daily. Provider, Historical Not Taking Active No   minoxidil (LONITEN) 2.5 mg tablet Take 5 mg by mouth daily. Provider, Historical Not Taking Active No   pantoprazole (PROTONIX) 40 mg tablet Take 1 Tab by mouth ACB/HS. Asha Boyce MD Not Taking Active No   predniSONE (DELTASONE) 5 mg tablet Take 2 Tabs by mouth daily. Shabnam Beavers MD Not Taking Active No                PHYSICAL EXAMINATION:  GENERAL:  Young female, well built, well nourished who is sedated. No acute distress. No seizure activity. VITAL SIGNS:  Vitals are stable. Blood pressure 144/82. HEENT:  Head is atraumatic, normocephalic. Eyes are closed. NECK:  No JVD. LUNGS:  Clear. HEART:  Regular rate and rhythm. Normal S1, S2.  ABDOMEN:  Soft, active bowel sounds. EXTREMITIES:  No peripheral edema. Right arm AV fistula is patent. CNS:  Sleepy and barely arousable. LABORATORY DATA:  Point of care hematocrit was 42, BUN 14, creatinine 4.06. Sodium slightly low at 133. Calcium, CO2 ,and potassium were normal.  Magnesium was 2.2. ASSESSMENT:  1.  End-stage renal disease, on hemodialysis. The patient already had hemodialysis earlier today. 2.  Hypertension. 3.  Lupus. 4.  History of thromboembolism. 5.  Secondary hyperparathyroidism. 6.  Seizure. RECOMMENDATIONS:  1. We will plan her next HD on Monday. 2.  I have verified with outpatient dialysis center, and she is on Eliquis 5 mg b.i.d. which we will resume. 3.  Continue home BP meds. 4.  Hold on EPO as her hematocrit is high and above goal.  5.  Add on phosphorus to labs done today. Thanks for renal consult. Discussed with Dr. Kali Garza.       Celso Pierre MD      [id number]  D:  10/18/2019 17:10  T:  10/18/2019 21:42  JOB #:  [id number]

## 2019-10-19 NOTE — PHYSICIAN ADVISORY
Letter of Status Determination:   Recommend hospitalization status DOWNGRADE dINPATIENT  Status   TO OBSERVATION   Pt Name:  Kolby Krishnamurthy   MR#   [address] [facility] # [id number] /  [id number]  Payor: Idolina Babinski / Plan: Carmelia Fruit / Product Type: Medicare /    EDGARDO#  [id number]   Room and Hospital  663/01  @ [facility]   Hospitalization date  10/18/2019 10:59 AM   Current Attending Physician  Scott Carreon MD   Principal diagnosis  Seizure [facility]) [R56.9]     Clinicals  36 y.o. y.o  female hospitalized with above diagnosis        Milliman (MCG) criteria   Does pply 44-year-old woman on dialysis who tells me she has been compliant. She had a seizure-like event today. I have discontinued Keppra for now as this may have been provoked. Obtain EEG. MRI brain. Neurology will follow up   STATUS DETERMINATION    MEET OBSERVATION   Additional comments     Payor: Idolina Babinski / Plan: Mario Fruit / Product Type: Medicare /           Adriana Mooney Physician [facility] - THE [address].  Jhonatan Haynes  T: [phone]    [email or web address]

## 2019-10-19 NOTE — PROGRESS NOTES
Neurology Progress Note    Patient ID:  Angle Medina  [id number]  66 y.o.  1985    Subjective:      Patient without further seizure activity this admission. MRI Brain completed and without acute pathology. EEG showing mild diffuse slowing. She admits to significant stressors following her brother's recent death. Current Facility-Administered Medications   Medication Dose Route Frequency    acetaminophen (TYLENOL) tablet 650 mg  650 mg Oral Q4H PRN    sodium chloride (NS) flush 5-40 mL  5-40 mL IntraVENous Q8H    sodium chloride (NS) flush 5-40 mL  5-40 mL IntraVENous PRN    LORazepam (ATIVAN) injection 2 mg  2 mg IntraVENous Q2H PRN    amLODIPine (NORVASC) tablet 10 mg  10 mg Oral DAILY    azaTHIOprine (IMURAN) tablet 50 mg  50 mg Oral DAILY AFTER BREAKFAST    carvedilol (COREG) tablet 25 mg  25 mg Oral BID WITH MEALS    hydroxychloroquine (PLAQUENIL) tablet 400 mg  400 mg Oral DAILY WITH BREAKFAST    losartan (COZAAR) tablet 100 mg  100 mg Oral DAILY    predniSONE (DELTASONE) tablet 10 mg  10 mg Oral DAILY    albuterol (PROVENTIL VENTOLIN) nebulizer solution 2.5 mg  2.5 mg Nebulization Q4H PRN    apixaban (ELIQUIS) tablet 5 mg  5 mg Oral BID          Objective:     Patient Vitals for the past 8 hrs:   BP Temp Pulse Resp SpO2   10/19/19 1415 114/75 98.3 °F (36.8 °C) 80 21 95 %       No intake/output data recorded. No intake/output data recorded.     Lab Review   Recent Results (from the past 24 hour(s))   CBC WITH AUTOMATED DIFF    Collection Time: 10/19/19  2:30 AM   Result Value Ref Range    WBC 2.8 (L) 3.6 - 11.0 K/uL    RBC 3.91 3.80 - 5.20 M/uL    HGB 10.8 (L) 11.5 - 16.0 g/dL    HCT 35.5 35.0 - 47.0 %    MCV 90.8 80.0 - 99.0 FL    MCH 27.6 26.0 - 34.0 PG    MCHC 30.4 30.0 - 36.5 g/dL    RDW 14.9 (H) 11.5 - 14.5 %    PLATELET 573 (L) 826 - 400 K/uL    MPV 11.2 8.9 - 12.9 FL    NRBC 0.0 0  WBC    ABSOLUTE NRBC 0.00 0.00 - 0.01 K/uL    NEUTROPHILS 68 32 - 75 % LYMPHOCYTES 18 12 - 49 %    MONOCYTES 10 5 - 13 %    EOSINOPHILS 3 0 - 7 %    BASOPHILS 1 0 - 1 %    IMMATURE GRANULOCYTES 0 0.0 - 0.5 %    ABS. NEUTROPHILS 1.9 1.8 - 8.0 K/UL    ABS. LYMPHOCYTES 0.5 (L) 0.8 - 3.5 K/UL    ABS. MONOCYTES 0.3 0.0 - 1.0 K/UL    ABS. EOSINOPHILS 0.1 0.0 - 0.4 K/UL    ABS. BASOPHILS 0.0 0.0 - 0.1 K/UL    ABS. IMM. GRANS. 0.0 0.00 - 0.04 K/UL    DF SMEAR SCANNED      RBC COMMENTS ANISOCYTOSIS  1+        RBC COMMENTS MACROCYTOSIS  1+        RBC COMMENTS SCHISTOCYTES  PRESENT        RBC COMMENTS OVALOCYTES  1+       METABOLIC PANEL, COMPREHENSIVE    Collection Time: 10/19/19  2:30 AM   Result Value Ref Range    Sodium 135 (L) 136 - 145 mmol/L    Potassium 4.2 3.5 - 5.1 mmol/L    Chloride 96 (L) 97 - 108 mmol/L    CO2 31 21 - 32 mmol/L    Anion gap 8 5 - 15 mmol/L    Glucose 89 65 - 100 mg/dL    BUN 27 (H) 6 - 20 MG/DL    Creatinine 6.01 (H) 0.55 - 1.02 MG/DL    BUN/Creatinine ratio 4 (L) 12 - 20      GFR est AA 10 (L) >60 ml/min/1.73m2    GFR est non-AA 8 (L) >60 ml/min/1.73m2    Calcium 8.5 8.5 - 10.1 MG/DL    Bilirubin, total 0.4 0.2 - 1.0 MG/DL    ALT (SGPT) 11 (L) 12 - 78 U/L    AST (SGOT) 16 15 - 37 U/L    Alk. phosphatase 56 45 - 117 U/L    Protein, total 7.9 6.4 - 8.2 g/dL    Albumin 3.2 (L) 3.5 - 5.0 g/dL    Globulin 4.7 (H) 2.0 - 4.0 g/dL    A-G Ratio 0.7 (L) 1.1 - 2.2       NEUROLOGICAL EXAM:    Appearance: The patient is well developed, well nourished, provides a coherent history and is in no acute distress. Mental Status: Oriented to time, place and person. Mood and affect appropriate. Cranial Nerves:   Intact visual fields. FAUSTINO, EOM's full, no nystagmus, no ptosis. Facial sensation is normal.  Facial movement is symmetric. Hearing is normal bilaterally. Palate is midline with normal sternocleidomastoid and trapezius muscles are normal. Tongue is midline. Motor:  5/5 strength in upper and lower proximal and distal muscles.     Reflexes:   Deferred   Sensory:   Normal to touch throughout   Gait:  Deferred   Tremor:   No tremor noted. Cerebellar:  No cerebellar signs present. Neurovascular:  Normal heart sounds and regular rhythm, peripheral pulses intact, and no carotid bruits. Assessment/Plan:     Active Problems:    Seizure ([address] (10/18/2019)    36year old AAF presenting with possible seizure activity following dialysis presently at her baseline without recurrence of symptoms. Neurological examination is non-focal.    MRI Brain completed and without acute pathology. EEG showing mild diffuse slowing. She is doing well today and anxious for discharge. Will continue to monitor off of AEDs due to likely provoked seizure. F/U Neurology, Dr. Vega Manual, 4 weeks post discharge. Please call if questions/concerns.       Signed:  Miryam Hsakins DO  10/19/2019  6:23 PM

## 2019-10-20 VITALS
OXYGEN SATURATION: 96 % | SYSTOLIC BLOOD PRESSURE: 116 MMHG | HEART RATE: 78 BPM | TEMPERATURE: 98.2 F | BODY MASS INDEX: 31.75 KG/M2 | DIASTOLIC BLOOD PRESSURE: 76 MMHG | RESPIRATION RATE: 14 BRPM | WEIGHT: 190.8 LBS

## 2019-10-20 PROCEDURE — 74011250637 HC RX REV CODE- 250/637: Performed by: INTERNAL MEDICINE

## 2019-10-20 PROCEDURE — 74011636637 HC RX REV CODE- 636/637: Performed by: INTERNAL MEDICINE

## 2019-10-20 PROCEDURE — 74011250636 HC RX REV CODE- 250/636: Performed by: INTERNAL MEDICINE

## 2019-10-20 RX ADMIN — LOSARTAN POTASSIUM 100 MG: 50 TABLET ORAL at 09:25

## 2019-10-20 RX ADMIN — AZATHIOPRINE 50 MG: 50 TABLET ORAL at 09:24

## 2019-10-20 RX ADMIN — PREDNISONE 10 MG: 10 TABLET ORAL at 09:25

## 2019-10-20 RX ADMIN — CARVEDILOL 25 MG: 12.5 TABLET, FILM COATED ORAL at 09:25

## 2019-10-20 RX ADMIN — AMLODIPINE BESYLATE 10 MG: 5 TABLET ORAL at 09:25

## 2019-10-20 RX ADMIN — Medication 10 ML: at 05:30

## 2019-10-20 RX ADMIN — APIXABAN 5 MG: 5 TABLET, FILM COATED ORAL at 09:25

## 2019-10-20 RX ADMIN — HYDROXYCHLOROQUINE SULFATE 400 MG: 200 TABLET, FILM COATED ORAL at 09:25

## 2019-10-20 NOTE — PROGRESS NOTES
Problem: Seizure Disorder (Adult)  Goal: *STG: Remains free of seizure activity  Outcome: Progressing Towards Goal  Goal: *STG: Remains safe in hospital  Outcome: Progressing Towards Goal     Problem: Pain  Goal: *Control of Pain  Outcome: Progressing Towards Goal     Problem: Falls - Risk of  Goal: *Absence of Falls  Description  Document Kadie Whitmore Fall Risk and appropriate interventions in the flowsheet.   Outcome: Progressing Towards Goal  Note:   Fall Risk Interventions:

## 2019-10-20 NOTE — DISCHARGE INSTRUCTIONS
Discharge Instructions       PATIENT ID: Rodolfo Long  MRN: [id number]   YOB: 1985    DATE OF ADMISSION: 10/18/2019 10:59 AM    DATE OF DISCHARGE: 10/20/2019    PRIMARY CARE PROVIDER: Carlos Enrique Freitas NP     ATTENDING PHYSICIAN: Leann Key MD  DISCHARGING PROVIDER: Cong Corley MD    To contact this individual call [phone] and ask the  to page. If unavailable ask to be transferred the Adult Hospitalist Department. DISCHARGE DIAGNOSES Seizures. CONSULTATIONS: IP CONSULT TO NEUROLOGY  IP CONSULT TO NEPHROLOGY    PROCEDURES/SURGERIES: * No surgery found *      FOLLOW UP APPOINTMENTS:   Follow-up Information    None          ADDITIONAL CARE RECOMMENDATIONS:   Follow up with neurology in 4 weeks  Follow up with nephrology and PCP in a week  Follow your dialysis schedule  Don't drive unless cleared by the neurology and you are seizure free     DIET: Regular Diet    ACTIVITY: Activity as tolerated      DISCHARGE MEDICATIONS:   See Medication Reconciliation Form    · It is important that you take the medication exactly as they are prescribed. · Keep your medication in the bottles provided by the pharmacist and keep a list of the medication names, dosages, and times to be taken in your wallet. · Do not take other medications without consulting your doctor. NOTIFY YOUR PHYSICIAN FOR ANY OF THE FOLLOWING:   Fever over 101 degrees for 24 hours. Chest pain, shortness of breath, fever, chills, nausea, vomiting, diarrhea, change in mentation, falling, weakness, bleeding. Severe pain or pain not relieved by medications. Or, any other signs or symptoms that you may have questions about.       DISPOSITION:    Home With:   OT  PT  HH  RN       SNF/Inpatient Rehab/LTAC    Independent/assisted living    Hospice    Other:     CDMP Checked:   Yes ***     PROBLEM LIST Updated:  Yes ***       Information obtained by :   I understand that if any problems occur once I am at home I am to contact my physician. I understand and acknowledge receipt of the instructions indicated above.                                                                                                                                              Physician's or R.N.'s Signature                                                                  Date/Time                                                                                                                                              Patient or Representative Signature                                                          Date/Time          Signed:   Chan Lew MD  10/20/2019  1:55 PM

## 2019-10-20 NOTE — DISCHARGE SUMMARY
Inpatient hospitalist discharge summary                Brief Overview    PATIENT ID: Tatiana Moreno    MRN: [id number]     YOB: 1985    Admitting Provider: Andrez Wang MD    Discharging Provider: Andrez Wang MD   To contact this individual call [phone] and ask the  to page. If unavailable ask to be transferred the Adult Hospitalist Department.       PCP at discharge: Evan Villagomez [phone]   [address] [facility] [address]    Admission date: 10/18/2019  Date of Discharge: 10/20/19    Chief complaint:   Chief Complaint   Patient presents with    Altered mental status     Patient Active Problem List   Diagnosis Code    Lupus M32.9    Lupus nephritis ([facility] [address] M32.14    Encounter for monitoring opioid maintenance therapy Z51.81, Z79.891    Malignant hypertension I10    Anemia of renal disease N18.9, D63.1    Dependence on peritoneal dialysis ([facility] [address] Z99.2    ACP (advance care planning) Z71.89    Chronic bilateral low back pain without sciatica M54.5, G89.29    Hypertension I10    Hypokalemia E87.6    Hypomagnesemia E83.42    Hypocalcemia E83.51    History of pulmonary embolism Z86.711    Peritonitis (HCC) K65.9    Generalized abdominal pain R10.84    Other constipation K59.09    Other ascites R18.8    Sepsis (HCC) A41.9    Intra-abdominal abscess (HCC) K65.1    Troponin level elevated R79.89    Rib pain on right side R07.81    SOB (shortness of breath) R06.02    ESRD on hemodialysis (HCC) N18.6, Z99.2    Hyperkalemia E87.5    Vaginal bleeding N93.9    Anemia due to blood loss D50.0    HCAP (healthcare-associated pneumonia) J18.9    Seizure ([facility] [address] R56.9         Discharge diagnosis, hospital course/plan:  The patient is a 78-year-old female with complex past medical history including chronic kidney disease on hemodialysis Monday Wednesday Friday, lupus, insomnia, and thromboembolus on Eliquis (questionable compliance) who was brought in to her aunt in a private outside vehicle with a chief complaint of seizure-like activity.  The aunt reports that patient finished her dialysis session today, they are driving home, when the patient had a 4-minute episode of convulsive activity and foaming at the mouth.  This ceased spontaneously and afterward the patient was confused and tired with altered mental status.  No recent trauma or falls.  On notes that her brother was found  2 days ago at home thought to be due to an overdose. Zabrina Lin denies overdose of ending her home medications.  She also denies alcohol or drug ingestion.  Currently she denies any pain.  No history of seizure disorder reported.  Chart review reveals that she was seen 2 days ago for insomnia and her amitriptyline was increased.      As per my history, patient is back to baseline. She does not remembered the episode. She reported that it happenend for the first time. There is questionable history of true seizure. Patient reported that she takes Eliquis but as per pharmacy it was filled in July. Also talked to Dr. Deonte Luis regarding this patient.     1. Seizures  Questionable seizure history  Was confused as per ED   Initial imaging negative   Neuuology consulted, negative imaging, EEG showing mild diffuse slowing. OK to discharge as per neurology. F/U Neurology, Dr. Bret Blankenship, 4 weeks post      2. SLE  On Azathiprine  hydroxyxhloroquine     3. CKD on HD  MWF  Follow  Up with nephrology      4. H/p PE  H/o PE as per patient 2 years ago  not sure why she is still on [facility]. SLE? Last refilled as per pharmacy in July  Will continue home dose     On the date of discharge, diagnostic face to face encounter was performed. Patient was hemodynamically stable, offering no new complaints. Denies any shortness of breath at rest, no fevers or chills, no diarrhea or constipation. Patient is agreeable for discharge.  Patient understood and verbalized the understanding of the discharge plan. Patient was advised to seek medical help/ care or return to ED, if symptoms recur, worsen or new symptoms develop. Discharge Disposition:  Home or Self Care    Discharge activity:  Activity as tolerated    Code status at discharge:  Full Code       Outpatient follow up:  PCP, neurology and nephrology       Future appointments-  Future Appointments   Date Time Provider Rocio Hardy   11/6/2019  3:00 PM Cora Kasper MD Shanna Mae     Follow-up Information     Follow up With Specialties Details Why Contact Info    Sandra Taylor NP Nurse Practitioner In 1 week  [address] [facility] [address] [facility]      Cora Kasper MD Neurology, Pain Management In 4 weeks  [address]  [address] \[facility] [place] [facility]\"" [address] [facility]      Meron Barlow MD Nephrology In 1 week  [address] [facility] [address]  [address] [address] [phone]                Consults: [address] [address]    * No surgery found *    Diet:  DIET REGULAR    Pertinent test results:  Mri Brain Wo Cont    Result Date: 10/19/2019  *PRELIMINARY REPORT* MRI examination of the brain demonstrates no acute findings. Preliminary report was provided by Dr. Laura Hollis, the on-call radiologist, at 9:33 PM Final report to follow. *END PRELIMINARY REPORT* EXAM:  MRI BRAIN WO CONT INDICATION:  Seizure. COMPARISON: MRI brain September 22, 2005. CT head from the same day. TECHNIQUE: Sagittal T1; coronal T2; axial T1, T2, FLAIR, gradient-echo, and diffusion weighted noncontrast MRI of the brain. FINDINGS: The ventricles and sulci are normal in size and configuration. No edema, midline shift, mass effect, or hemorrhage. No evidence of acute infarct. Medial temporal lobes are symmetric. Major intracranial flow voids are present. IMPRESSION: No evidence of hemorrhage or acute infarct. No mass or edema.      Ct Head Wo Cont    Result Date: 10/18/2019  EXAM: CT HEAD WO CONT INDICATION: new onset seizure, on eliquis, post-ictal COMPARISON: None. CONTRAST: None. TECHNIQUE: Unenhanced CT of the head was performed using 5 mm images. Brain and bone windows were generated. CT dose reduction was achieved through use of a standardized protocol tailored for this examination and automatic exposure control for dose modulation. FINDINGS: The ventricles and sulci are normal in size, shape and configuration and midline. There is no significant white matter disease. There is no intracranial hemorrhage, extra-axial collection, mass, mass effect or midline shift. The basilar cisterns are open. No acute infarct is identified. The bone windows demonstrate no abnormalities. The visualized portions of the paranasal sinuses and mastoid air cells are clear. IMPRESSION: No acute intracranial abnormality    Xr Chest Port    Result Date: 10/18/2019  Indication: Altered mental status, lethargy Comparison: None Portable exam of the chest obtained at 1143 demonstrates normal heart size. There is no acute process in the lung fields. The osseous structures are unremarkable. Impression: No acute process. Recent Results (from the past 336 hour(s))   GLUCOSE, POC    Collection Time: 10/18/19 11:04 AM   Result Value Ref Range    Glucose (POC) 85 65 - 100 mg/dL    Performed by Sebastian Love    METABOLIC PANEL, COMPREHENSIVE    Collection Time: 10/18/19 11:27 AM   Result Value Ref Range    Sodium 133 (L) 136 - 145 mmol/L    Potassium 4.3 3.5 - 5.1 mmol/L    Chloride 96 (L) 97 - 108 mmol/L    CO2 29 21 - 32 mmol/L    Anion gap 8 5 - 15 mmol/L    Glucose 82 65 - 100 mg/dL    BUN 14 6 - 20 MG/DL    Creatinine 4.06 (H) 0.55 - 1.02 MG/DL    BUN/Creatinine ratio 3 (L) 12 - 20      GFR est AA 15 (L) >60 ml/min/1.73m2    GFR est non-AA 13 (L) >60 ml/min/1.73m2    Calcium 9.2 8.5 - 10.1 MG/DL    Bilirubin, total 0.4 0.2 - 1.0 MG/DL    ALT (SGPT) 15 12 - 78 U/L    AST (SGOT) 28 15 - 37 U/L    Alk. phosphatase 64 45 - 117 U/L    Protein, total 9.2 (H) 6.4 - 8.2 g/dL    Albumin 3.6 3.5 - 5.0 g/dL    Globulin 5.6 (H) 2.0 - 4.0 g/dL    A-G Ratio 0.6 (L) 1.1 - 2.2     TROPONIN I    Collection Time: 10/18/19 11:27 AM   Result Value Ref Range    Troponin-I, Qt. <0.05 <0.05 ng/mL   ETHYL ALCOHOL    Collection Time: 10/18/19 11:27 AM   Result Value Ref Range    ALCOHOL(ETHYL),SERUM <10 <10 MG/DL   MAGNESIUM    Collection Time: 10/18/19 11:27 AM   Result Value Ref Range    Magnesium 2.2 1.6 - 2.4 mg/dL   POC CHEM8    Collection Time: 10/18/19 11:28 AM   Result Value Ref Range    Calcium, ionized (POC) 1.01 (L) 1.12 - 1.32 mmol/L    Sodium (POC) 135 (L) 136 - 145 mmol/L    Potassium (POC) 4.3 3.5 - 5.1 mmol/L    Chloride (POC) 96 (L) 98 - 107 mmol/L    CO2 (POC) 32 21 - 32 mmol/L    Anion gap (POC) 13 10 - 20 mmol/L    Glucose (POC) 85 65 - 100 mg/dL    BUN (POC) 16 9 - 20 mg/dL    Creatinine (POC) 4.4 (H) 0.6 - 1.3 mg/dL    GFRAA, POC 14 (L) >60 ml/min/1.73m2    GFRNA, POC 12 (L) >60 ml/min/1.73m2    Hematocrit (POC) 42 35.0 - 47.0 %    Comment Comment Not Indicated.      EKG, 12 LEAD, INITIAL    Collection Time: 10/18/19 11:46 AM   Result Value Ref Range    Ventricular Rate 98 BPM    Atrial Rate 98 BPM    P-R Interval 176 ms    QRS Duration 108 ms    Q-T Interval 414 ms    QTC Calculation (Bezet) 528 ms    Calculated P Axis 48 degrees    Calculated R Axis 57 degrees    Calculated T Axis 41 degrees    Diagnosis       Normal sinus rhythm  Septal infarct , age undetermined  Prolonged QT  When compared with ECG of 31-JUL-2019 14:18,  Septal infarct is now present  Confirmed by Luz Cuevas MD. ([id number]) on 10/19/2019 9:08:02 PM     TYPE & SCREEN    Collection Time: 10/18/19 12:26 PM   Result Value Ref Range    Crossmatch Expiration 10/21/2019     ABO/Rh(D) Alphonso Pyo POSITIVE     Antibody screen NEG    PROTHROMBIN TIME + INR    Collection Time: 10/18/19 12:26 PM   Result Value Ref Range    INR 1.1 0.9 - 1.1      Prothrombin time 11.1 9.0 - 11.1 sec   SAMPLES BEING HELD    Collection Time: 10/18/19 12:30 PM   Result Value Ref Range    SAMPLES BEING HELD  1 LAV, 1PST, 1 RED,1 SILVER BC     COMMENT        Add-on orders for these samples will be processed based on acceptable specimen integrity and analyte stability, which may vary by analyte. POC LACTIC ACID    Collection Time: 10/18/19  1:33 PM   Result Value Ref Range    Lactic Acid (POC) 1.86 0.40 - 2.00 mmol/L   CBC WITH AUTOMATED DIFF    Collection Time: 10/19/19  2:30 AM   Result Value Ref Range    WBC 2.8 (L) 3.6 - 11.0 K/uL    RBC 3.91 3.80 - 5.20 M/uL    HGB 10.8 (L) 11.5 - 16.0 g/dL    HCT 35.5 35.0 - 47.0 %    MCV 90.8 80.0 - 99.0 FL    MCH 27.6 26.0 - 34.0 PG    MCHC 30.4 30.0 - 36.5 g/dL    RDW 14.9 (H) 11.5 - 14.5 %    PLATELET 371 (L) 119 - 400 K/uL    MPV 11.2 8.9 - 12.9 FL    NRBC 0.0 0  WBC    ABSOLUTE NRBC 0.00 0.00 - 0.01 K/uL    NEUTROPHILS 68 32 - 75 %    LYMPHOCYTES 18 12 - 49 %    MONOCYTES 10 5 - 13 %    EOSINOPHILS 3 0 - 7 %    BASOPHILS 1 0 - 1 %    IMMATURE GRANULOCYTES 0 0.0 - 0.5 %    ABS. NEUTROPHILS 1.9 1.8 - 8.0 K/UL    ABS. LYMPHOCYTES 0.5 (L) 0.8 - 3.5 K/UL    ABS. MONOCYTES 0.3 0.0 - 1.0 K/UL    ABS. EOSINOPHILS 0.1 0.0 - 0.4 K/UL    ABS. BASOPHILS 0.0 0.0 - 0.1 K/UL    ABS. IMM.  GRANS. 0.0 0.00 - 0.04 K/UL    DF SMEAR SCANNED      RBC COMMENTS ANISOCYTOSIS  1+        RBC COMMENTS MACROCYTOSIS  1+        RBC COMMENTS SCHISTOCYTES  PRESENT        RBC COMMENTS OVALOCYTES  1+       METABOLIC PANEL, COMPREHENSIVE    Collection Time: 10/19/19  2:30 AM   Result Value Ref Range    Sodium 135 (L) 136 - 145 mmol/L    Potassium 4.2 3.5 - 5.1 mmol/L    Chloride 96 (L) 97 - 108 mmol/L    CO2 31 21 - 32 mmol/L    Anion gap 8 5 - 15 mmol/L    Glucose 89 65 - 100 mg/dL    BUN 27 (H) 6 - 20 MG/DL    Creatinine 6.01 (H) 0.55 - 1.02 MG/DL    BUN/Creatinine ratio 4 (L) 12 - 20      GFR est AA 10 (L) >60 ml/min/1.73m2    GFR est non-AA 8 (L) >60 ml/min/1.73m2    Calcium 8.5 8.5 - 10.1 MG/DL    Bilirubin, total 0.4 0.2 - 1.0 MG/DL    ALT (SGPT) 11 (L) 12 - 78 U/L    AST (SGOT) 16 15 - 37 U/L    Alk. phosphatase 56 45 - 117 U/L    Protein, total 7.9 6.4 - 8.2 g/dL    Albumin 3.2 (L) 3.5 - 5.0 g/dL    Globulin 4.7 (H) 2.0 - 4.0 g/dL    A-G Ratio 0.7 (L) 1.1 - 2.2             Physical Exam on Discharge:    Discharge condition: good    Vital signs:   Patient Vitals for the past 12 hrs:   Temp Pulse Resp BP   10/20/19 1027 98 °F (36.7 °C) 77 18 118/83   10/20/19 0548 98.1 °F (36.7 °C) 76 20 132/83       Visit Vitals  /83   Pulse 77   Temp 98 °F (36.7 °C)   Resp 18   Wt 86.5 kg (190 lb 12.8 oz)   SpO2 96%   BMI 31.75 kg/m²     General:  Alert, cooperative, no distress, appears stated age. Head:  Normocephalic, without obvious abnormality, atraumatic. Lungs:   Clear to auscultation bilaterally. Chest wall:  No tenderness or deformity. Heart:  Regular rate and rhythm, S1, S2 normal, no murmur, click, rub or gallop. Abdomen:   Soft, non-tender. Bowel sounds normal. No masses,  No organomegaly. Extremities: Extremities normal, atraumatic, no cyanosis or edema. Pulses: 2+ and symmetric all extremities. Skin: Skin color, texture, turgor normal. No rashes or lesions. Current Discharge Medication List      CONTINUE these medications which have NOT CHANGED    Details   apixaban (ELIQUIS) 5 mg tablet Take 5 mg by mouth every twelve (12) hours. amitriptyline (ELAVIL) 75 mg tablet Take 1 Tab by mouth nightly for 90 days. For sleep difficulty  Qty: 90 Tab, Refills: 1    Comments: **Patient requests 90 days supply**  Associated Diagnoses: Insomnia, unspecified type      pantoprazole (PROTONIX) 40 mg tablet Take 1 Tab by mouth ACB/HS. Qty: 60 Tab, Refills: 0      minoxidil (LONITEN) 2.5 mg tablet Take 5 mg by mouth daily. losartan (COZAAR) 100 mg tablet Take 100 mg by mouth daily.       hydroxychloroquine (PLAQUENIL) 200 mg tablet TAKE 2 TABLETS BY MOUTH DAILY  Qty: 180 Tab, Refills: 6    Comments: **Patient requests 90 days supply**      ferric citrate (AURYXIA) 210 mg iron tablet Take 420 mg by mouth three (3) times daily (with meals). carvedilol (COREG) 25 mg tablet Take 1 Tab by mouth two (2) times daily (with meals). Qty: 60 Tab, Refills: 0      amLODIPine (NORVASC) 10 mg tablet Take 1 Tab by mouth daily. Qty: 30 Tab, Refills: 0      predniSONE (DELTASONE) 5 mg tablet Take 2 Tabs by mouth daily. Qty: 30 Tab, Refills: 0      gemfibrozil (LOPID) 600 mg tablet Take 300 mg by mouth daily. azaTHIOprine (IMURAN) 50 mg tablet Take 50 mg by mouth daily (after breakfast).                Total time spent on discharge planning, counseling and co-ordination of care:   35 minutes    Alena Zhu MD  10/20/19  1:34 PM

## 2019-10-21 ENCOUNTER — PATIENT OUTREACH (OUTPATIENT)
Dept: FAMILY MEDICINE CLINIC | Age: 34
End: 2019-10-21

## 2019-10-21 NOTE — PROGRESS NOTES
Hospital Discharge Follow-Up      Date/Time:  10/21/2019 1:39 PM    Patient was admitted to [facility] on 10/18 and discharged on 10/20 for AMS. The physician discharge summary was available at the time of outreach. Patient was contacted within 1 business days of discharge. Top Challenges reviewed with the provider   Recent death of brother ? OD  [facility] 10/18-10/20 AMS- ? Stress related. Advance Care Planning:   Does patient have an Advance Directive:  not on file; education provided        Method of communication with provider :chart routing    Inpatient RRAT score: Not available                                                                                                                                     Was this a readmission? No  Patient stated reason for the readmission: umair    Care Transition Nurse (CTN) contacted the patient by telephone to perform post hospital discharge assessment. Verified name and  with patient as identifiers. Provided introduction to self, and explanation of the CTN role. Patient received hospital discharge instructions. CTN reviewed discharge instructions and red flags with patient who verbalized understanding. Patient given an opportunity to ask questions and does not have any further questions or concerns at this time. The patient agrees to contact the PCP office for questions related to their healthcare. CTN provided contact information for future reference. Disease Specific:   N/A    Patients top risk factors for readmission:  Medical condition-Lupus, ESRD on dialysis, etc. Recent death of brother-? OD. Ineffective coping.      Home Health orders at discharge: [address]: na  Date of initial visit: umair    Durable Medical Equipment ordered at discharge: none  [address] [address] received: umair    Medication(s):   New Medications at Discharge: none  Changed Medications at Discharge: none  Discontinued Medications at Discharge: none    Medication reconciliation was performed with patient, who verbalizes understanding of administration of home medications. There were no barriers to obtaining medications identified at this time. Referral to Pharm D needed: no     Current Outpatient Medications   Medication Sig    apixaban (ELIQUIS) 5 mg tablet Take 5 mg by mouth every twelve (12) hours.  amitriptyline (ELAVIL) 75 mg tablet Take 1 Tab by mouth nightly for 90 days. For sleep difficulty    pantoprazole (PROTONIX) 40 mg tablet Take 1 Tab by mouth ACB/HS.  minoxidil (LONITEN) 2.5 mg tablet Take 5 mg by mouth daily.  losartan (COZAAR) 100 mg tablet Take 100 mg by mouth daily.  hydroxychloroquine (PLAQUENIL) 200 mg tablet TAKE 2 TABLETS BY MOUTH DAILY    ferric citrate (AURYXIA) 210 mg iron tablet Take 420 mg by mouth three (3) times daily (with meals).  carvedilol (COREG) 25 mg tablet Take 1 Tab by mouth two (2) times daily (with meals).  amLODIPine (NORVASC) 10 mg tablet Take 1 Tab by mouth daily.  predniSONE (DELTASONE) 5 mg tablet Take 2 Tabs by mouth daily.  gemfibrozil (LOPID) 600 mg tablet Take 300 mg by mouth daily.  azaTHIOprine (IMURAN) 50 mg tablet Take 50 mg by mouth daily (after breakfast). No current facility-administered medications for this visit. There are no discontinued medications. [facility] follow up appointment(s):   Future Appointments   Date Time Provider Rocio Hardy   10/24/2019  3:30 PM KHANH Romano   11/6/2019  3:00 PM MD Shanna Quintana      Non-[facility] follow up appointment(s): Jaye Arredondo, sees patient at dialysis center. [facility]:  information provided as a resource       Goals        Patient [address] (pt-stated)      08/15/19  · Patient remains full code, says mother will make ACP decisions, not interested in completing form at this time.   · ACP addressed with patient, who states she has information, mother is decision maker. · Encouraged to schedule with NN appointment for completion of form  · NN will follow up in 7-10 days. pk             Other     Patient/Family verbalizes understanding of self-management of HTN with ESRF      · [facility] admit with Accelerated HTN, per nephrology often related to interdialytic fluid gains  · Medication review completed  · Encouraged to take all medications as ordered  · Reviewed discharge instructions  · Reminded of scheduled follow-up appointment  · Try to limit how much sodium you eat to less than 2,300 milligrams (mg) a day. Your doctor may ask you to try to eat less than 1,500 mg a day. · Eat plenty of fruits (such as bananas and oranges), vegetables, legumes, whole grains, and low-fat dairy products. · Lower the amount of saturated fat in your diet. Saturated fat is found in animal products such as milk, cheese, and meat. Limiting these foods may help you lose weight and also lower your risk for heart disease. .  · NN will follow in 7-10 days. pk    08/28/19  · Completed Outreach to Patient, patient admits to feeling good  · Patient admits to takng all medications as ordered, Medication review completed. · Denies constipation, including fiber in meals  · Plans follow up with Neurology on 9/19/19  · Plans follow up with GI on 9/13/19  · Decline PCP follow up at this time  · NN will follow in 7-10 days. pk         Prevent complications post hospitalization. 10/21/19 [facility] 10/18-10/20 AMS. · Reviewed discharge instructions with patient  · Reviewed meds with patient-no new meds, no changes. · Reviewed red flags: sob,confusion,swelling in extremities, fever,weakness,nausea,vomiting,abdominal pain,diarrhea. · SURJIT with pcp, KHANH Caballero- 10/24 at 3:30pm  ·  11/6 at 3 pm (neuro)  ·  (neph)-sees at dialysis center. · Gave patient info on [facility] as resource.   · Gave her CTN contact info if any questions/concerns. · Support given as deals with recent death of brother. · Advised CTN will check back with her in 7-10 days, sooner [email or web address]       Understands management post discharge for acute abdominal pain      · 08/15/19 [facility] [id number] for Acute abdominal pain and hyperkalemia  Reviewed red flags for abdominal pain:   · If pain becomes severe, vomiting blood or what looks like blood, pass maroon stools, you passed out, fever, notify provider immediately  · Take pain medication as ordered with tylenol and percocet  · Take ordered laxative, fluids and fiber to prevent constipation (taking opioids)  · Agrees to follow ups as ordered. Agrees to [employer] provider if:  · You feel weak and lightheaded. · Your pain becomes focused in one area of your belly. · Your belly pain is worse after you cough or move. · You have a new or higher fever. · Reviewed discharge orders, medications and follow up appointments    · Patient verbalized agreement to follow up as scheduled. Altered Bowel elimination:  · Educated patient to Identify factors (e.g., medications, bed rest, diet) that may cause or contribute to constipation. · Patient agrees to increased fluid intake, unless contraindicated. · Referral to Evaluate medication profile for gastrointestinal side effects. · Educated patient on how to keep a food diary. · Patient agrees on a high-fiber diet, as appropriate. · Instruct her on the relationship of diet, exercise, and fluid intake to  constipation and impaction. NN will follow in 7-10 days. pk  09/10/19  · Admits to compliance to follow up appointments  · Medications reconciliation completed  · Denies above red flags  · Denies issues with constipation or abdominal pain  · Admits to knowing how to contact providers of condition changes  · NN will follow again in 7-10 days.  pk    09/20/19  · Admits to feeling well  · Had Neurology follow up on 9/19/19  · Continue to taper her percocet with no increase in pain  · Remain compliant with [facility]  Tuesday,  Thursday,  and Saturday  · Med reconciliation completed  · Reviewed follow up appointments. · NN will follow in 7-10 days. pk    10/17/19  · Patient states she had follow up with Neurology on 10/16/19 for chronic pain  · Continue to take Pain medication- decreasing dose, still chronic pain in back  · Rescheduled dialysis appointment this week due to loss of brother  · Medication reconciliation completed  · Problems with insomnia-treated with Elavil  · NN will follow in 7-10 days.  pk

## 2019-11-06 ENCOUNTER — OFFICE VISIT (OUTPATIENT)
Dept: NEUROLOGY | Age: 34
End: 2019-11-06

## 2019-11-06 VITALS
HEIGHT: 65 IN | WEIGHT: 184 LBS | HEART RATE: 56 BPM | DIASTOLIC BLOOD PRESSURE: 90 MMHG | SYSTOLIC BLOOD PRESSURE: 138 MMHG | BODY MASS INDEX: 30.66 KG/M2

## 2019-11-06 DIAGNOSIS — M54.50 CHRONIC BILATERAL LOW BACK PAIN WITHOUT SCIATICA: Primary | ICD-10-CM

## 2019-11-06 DIAGNOSIS — G89.29 CHRONIC BILATERAL LOW BACK PAIN WITHOUT SCIATICA: Primary | ICD-10-CM

## 2019-11-06 RX ORDER — OXYCODONE AND ACETAMINOPHEN 5; 325 MG/1; MG/1
0.5 TABLET ORAL
Qty: 14 TAB | Refills: 0 | Status: SHIPPED | OUTPATIENT
Start: 2019-11-06 | End: 2019-12-05 | Stop reason: SDUPTHER

## 2019-11-06 NOTE — PROGRESS NOTES
Interval HPI:     This is a 36 y.o. female who is following up for     PMHx: chronic back pain, hx of fibromyalgia, hx of DVT/ coumadin, hx of Lupus, mild lower lumbar disc degeneration (L4-5 and L5-S1 with small annular tear at L5-S1)    Chief Complaint   Patient presents with    Back Pain     No change in moderate-severe lower back pain. Last visit reduced # of tablets from 17 tab/ month to 14 tabs/ month and advised to break them in half and try to limit it to HALF tab/ day. Pt says she tried to stick to that plan but ended up taking more and running out 7 days ago. She still agrees with plan to taper off but asks to keep medication same for this month as her pain flares when whether changes. She believes next month she will be ready to continue tapering down. Tried/ failed: Gabapentin, Cymbalta, Amitriptyline (only helped get to sleep), Lyrica (abnormal behaviors), Hydrocodone (\"too strong\")    Brief ROS: as above      Allergies   Allergen Reactions    Compazine [Prochlorperazine Edisylate] Nausea and Vomiting and Palpitations     \"hot and lightheaded\"     Current Outpatient Medications   Medication Sig Dispense Refill    oxyCODONE-acetaminophen (PERCOCET) 5-325 mg per tablet Take 0.5 Tabs by mouth daily as needed for Pain (moderate to severe) for up to 30 days. 14 Tab 0    apixaban (ELIQUIS) 5 mg tablet Take 5 mg by mouth every twelve (12) hours.  amitriptyline (ELAVIL) 75 mg tablet Take 1 Tab by mouth nightly for 90 days. For sleep difficulty 90 Tab 1    pantoprazole (PROTONIX) 40 mg tablet Take 1 Tab by mouth ACB/HS. 60 Tab 0    minoxidil (LONITEN) 2.5 mg tablet Take 5 mg by mouth daily.  losartan (COZAAR) 100 mg tablet Take 100 mg by mouth daily.  hydroxychloroquine (PLAQUENIL) 200 mg tablet TAKE 2 TABLETS BY MOUTH DAILY 180 Tab 6    ferric citrate (AURYXIA) 210 mg iron tablet Take 420 mg by mouth three (3) times daily (with meals).       carvedilol (COREG) 25 mg tablet Take 1 Tab by mouth two (2) times daily (with meals). 60 Tab 0    amLODIPine (NORVASC) 10 mg tablet Take 1 Tab by mouth daily. 30 Tab 0    predniSONE (DELTASONE) 5 mg tablet Take 2 Tabs by mouth daily. 30 Tab 0    gemfibrozil (LOPID) 600 mg tablet Take 300 mg by mouth daily.  azaTHIOprine (IMURAN) 50 mg tablet Take 50 mg by mouth daily (after breakfast). Physical Exam  Vitals:    19 1457   BP: 138/90   Pulse: (!) 56   Weight: 83.5 kg (184 lb)   Height: 5' 5\" (1.651 m)     PMHx:   Past Medical History:   Diagnosis Date    Anemia     secondary to lupus    Asthma     no inhaler use in past 2 to 3 years    Carditis     Chronic kidney disease     ESRD    Chronic pain     DDD (degenerative disc disease), lumbar     [facility] disease) [facility]     GERD (gastroesophageal reflux disease)     Hemodialysis patient ([facility] 2017    [address]  Tuesday,  Thursday,  and Saturday.  Hypercholesterolemia     Hypertension     Intractable nausea and vomiting 10/21/2015    Long term (current) use of anticoagulants     Lupus (systemic lupus erythematosus) (HCC)     Malignant hypertension with chronic kidney disease stage V ([facility]     Peritoneal dialysis status ([facility] 10/2015    x 2 years Stopped 2017 due to infection and removed.  Poor historian 2018    With medications    Thromboembolus [facility]) 2013    lungs    Transfusion history     [facility] Trans[facility] 2017  [facility]     PSHx:  has a past surgical history that includes hx  section (2006); hx other surgical (9/16/15); hx vascular access (Right, 2017); and hx vascular access (Right, 2018). SocHx:  reports that she has never smoked. She has never used smokeless tobacco. She reports that she has current or past drug history. Drugs: Prescription and OTC. She reports that she does not drink alcohol.     FHx: family history includes Cancer in an other family member; Diabetes in her father and mother; Hypertension in her father. Awake, alert, conversant  Neck: supple  Ext: dialysis fistula in right forearm    MSK:  Lumbar spine: Moderate pain with back flexion, extension  Mild pain with facet loading to both sides    Focused Neurological Exam     Mental status:   Alert and oriented to person, place situation  Mood appears stable  Normal thought processes    CNs: EOMI, Face symmetric, Hearing/ Language normal  Sensory: intact light touch  Motor: 4/ 5 strength in arms and legs    Reflexes: not examined  Gait: normal      Impression    ICD-10-CM ICD-9-CM    1. Chronic bilateral low back pain without sciatica M54.5 724.2 oxyCODONE-acetaminophen (PERCOCET) 5-325 mg per tablet    G89.29 338.29         1) Chronic bilateral lower back pain without sciatica    Renewed Rx Percocet 5-325 HALF tab daily as needed for moderate to severe back pain (#14 tablet)  Next visit, will continue taper off. Continue Amitriptyline 75 mg QHS.         Signed By: Evon Constantino MD     November 6, 2019

## 2019-11-15 ENCOUNTER — PATIENT OUTREACH (OUTPATIENT)
Dept: FAMILY MEDICINE CLINIC | Age: 34
End: 2019-11-15

## 2019-12-05 ENCOUNTER — OFFICE VISIT (OUTPATIENT)
Dept: NEUROLOGY | Age: 34
End: 2019-12-05

## 2019-12-05 VITALS
HEIGHT: 65 IN | WEIGHT: 184 LBS | OXYGEN SATURATION: 98 % | DIASTOLIC BLOOD PRESSURE: 98 MMHG | HEART RATE: 101 BPM | SYSTOLIC BLOOD PRESSURE: 130 MMHG | BODY MASS INDEX: 30.66 KG/M2

## 2019-12-05 DIAGNOSIS — G89.29 CHRONIC BILATERAL LOW BACK PAIN WITHOUT SCIATICA: Primary | ICD-10-CM

## 2019-12-05 DIAGNOSIS — G47.00 INSOMNIA, UNSPECIFIED TYPE: ICD-10-CM

## 2019-12-05 DIAGNOSIS — M54.50 CHRONIC BILATERAL LOW BACK PAIN WITHOUT SCIATICA: Primary | ICD-10-CM

## 2019-12-05 RX ORDER — OXYCODONE AND ACETAMINOPHEN 5; 325 MG/1; MG/1
0.5 TABLET ORAL
Qty: 10 TAB | Refills: 0 | Status: SHIPPED | OUTPATIENT
Start: 2019-12-05 | End: 2019-12-25

## 2019-12-05 RX ORDER — AMITRIPTYLINE HYDROCHLORIDE 50 MG/1
100 TABLET, FILM COATED ORAL
Qty: 180 TAB | Refills: 1 | Status: SHIPPED | OUTPATIENT
Start: 2019-12-05 | End: 2020-02-24

## 2019-12-05 NOTE — PROGRESS NOTES
Interval HPI:     This is a 36 y.o. female who is following up for     PMHx: chronic back pain, hx of fibromyalgia, hx of DVT/ coumadin, hx of Lupus, mild lower lumbar disc degeneration (L4-5 and L5-S1 with small annular tear at L5-S1)    Chief Complaint   Patient presents with    Follow-up     pain management     No change in moderate-severe lower back pain. Reviewed . Patient last filled Percocet 5-325 #14 tablets on 11/6/2019. We discussed tapering off the Percocet this month and she is agreeable to that. She has been taking amitriptyline 50 mg 1 to 1-1/2 tablets at bedtime and she says frequently this is not enough to help her sleep. She asked for a increased dose of the amitriptyline. Tried/ failed: Gabapentin, Cymbalta, Amitriptyline (only helped get to sleep), Lyrica (abnormal behaviors), Hydrocodone (\"too strong\")    Brief ROS: as above      Allergies   Allergen Reactions    Compazine [Prochlorperazine Edisylate] Nausea and Vomiting and Palpitations     \"hot and lightheaded\"     Current Outpatient Medications   Medication Sig Dispense Refill    oxyCODONE-acetaminophen (PERCOCET) 5-325 mg per tablet Take 0.5 Tabs by mouth daily as needed for Pain (moderate to severe) for up to 20 days. Then Stop Taking. 10 Tab 0    amitriptyline (ELAVIL) 50 mg tablet Take 2 Tabs by mouth nightly for 90 days. Antidepressant for Sleep Difficulty 180 Tab 1    apixaban (ELIQUIS) 5 mg tablet Take 5 mg by mouth every twelve (12) hours.  pantoprazole (PROTONIX) 40 mg tablet Take 1 Tab by mouth ACB/HS. 60 Tab 0    minoxidil (LONITEN) 2.5 mg tablet Take 5 mg by mouth daily.  losartan (COZAAR) 100 mg tablet Take 100 mg by mouth daily.  hydroxychloroquine (PLAQUENIL) 200 mg tablet TAKE 2 TABLETS BY MOUTH DAILY 180 Tab 6    ferric citrate (AURYXIA) 210 mg iron tablet Take 420 mg by mouth three (3) times daily (with meals).       carvedilol (COREG) 25 mg tablet Take 1 Tab by mouth two (2) times daily (with meals). 60 Tab 0    amLODIPine (NORVASC) 10 mg tablet Take 1 Tab by mouth daily. 30 Tab 0    predniSONE (DELTASONE) 5 mg tablet Take 2 Tabs by mouth daily. 30 Tab 0    gemfibrozil (LOPID) 600 mg tablet Take 300 mg by mouth daily.  azaTHIOprine (IMURAN) 50 mg tablet Take 50 mg by mouth daily (after breakfast). Physical Exam  Vitals:    19 1443   BP: (!) 130/98   Pulse: (!) 101   SpO2: 98%   Weight: 83.5 kg (184 lb)   Height: 5' 5\" (1.651 m)     PMHx:   Past Medical History:   Diagnosis Date    Anemia     secondary to lupus    Asthma     no inhaler use in past 2 to 3 years    Carditis     Chronic kidney disease     ESRD    Chronic pain     DDD (degenerative disc disease), lumbar     [facility]) [facility]     GERD (gastroesophageal reflux disease)     Hemodialysis patient ([facility] 2017    [address]  Tuesday,  Thursday,  and Saturday.  Hypercholesterolemia     Hypertension     Intractable nausea and vomiting 10/21/2015    Long term (current) use of anticoagulants     Lupus (systemic lupus erythematosus) (HCC)     Malignant hypertension with chronic kidney disease stage V ([facility]     Peritoneal dialysis status ([facility] 10/2015    x 2 years Stopped 2017 due to infection and removed.  Poor historian 2018    With medications    Thromboembolus [facility]) 2013    lungs    Transfusion history     Last Transfusion 2017  at [address] [place] [address]     PSHx:  has a past surgical history that includes hx  section (2006); hx other surgical (9/16/15); hx vascular access (Right, 2017); and hx vascular access (Right, 2018). SocHx:  reports that she has never smoked. She has never used smokeless tobacco. She reports current drug use. Drugs: Prescription and OTC. She reports that she does not drink alcohol. FHx: family history includes Cancer in an other family member; Diabetes in her father and mother; Hypertension in her father. Awake, alert, conversant  Neck: supple  Ext: dialysis fistula in right forearm    MSK:  Lumbar spine: Moderate pain with back flexion, extension  Mild pain with facet loading to both sides    Focused Neurological Exam     Mental status:   Alert and oriented to person, place situation  Mood appears stable  Normal thought processes    CNs: EOMI, Face symmetric, Hearing/ Language normal  Sensory: intact light touch  Motor: 4/ 5 strength in arms and legs    Reflexes: not examined  Gait: normal      Impression    ICD-10-CM ICD-9-CM    1. Chronic bilateral low back pain without sciatica M54.5 724.2 oxyCODONE-acetaminophen (PERCOCET) 5-325 mg per tablet    G89.29 338.29    2. Insomnia, unspecified type G47.00 780.52 amitriptyline (ELAVIL) 50 mg tablet        1) Chronic bilateral lower back pain without sciatica  Gave last Rx of Percocet 5-325. HALF tab on days where pain is moderate or severe (#20 tablets). No further refills. Pt agrees. Advised her to do home stretching exercise to reduce her chronic back pain.     2) Insomnia  Increased Amitriptyline 50 mg tablet to 2 tabs QHS    Follow up in 6 months    Signed By: Fanny Brasher MD     December 5, 2019

## 2019-12-10 ENCOUNTER — PATIENT OUTREACH (OUTPATIENT)
Dept: FAMILY MEDICINE CLINIC | Age: 34
End: 2019-12-10

## 2019-12-19 ENCOUNTER — PATIENT OUTREACH (OUTPATIENT)
Dept: FAMILY MEDICINE CLINIC | Age: 34
End: 2019-12-19

## 2019-12-19 NOTE — PROGRESS NOTES
Ambulatory Care Management Note    Date/Time:  12/19/2019 3:44 PM    This Ambulatory Care Manager (ACM) reviewed and updated the following screenings during this call; general assessment and disease specific management     Patient's challenges to self management identified: limited functional physical ability due to chronic pain    Medication Management:  Admits to adherence and understnding    Advance Care Planning:   Does patient have an Advance Directive:  patient declined education, states she has discussed this in the pass. Her mother is her decision maker and will make all decisions for her. Admits to having forms at home. ACM explained the importance of completing the form. Patient is full code. [employer], Referrals, and Durable Medical Equipment:       Health Maintenance Due   Topic Date Due    Pneumococcal 0-64 years (1 of 3 - PCV13) 02/17/1992    DTaP/Tdap/Td series (1 - Tdap) 02/17/1997    PAP AKA CERVICAL CYTOLOGY  04/13/2019    MEDICARE YEARLY EXAM  08/01/2019    Influenza Age 9 to Adult  08/01/2019     Health Maintenance reviewed - Overdue procedures/test discussed. Patient was asked to consider health care goals that they would like to focus on with this ACM. ACM will follow up with patient to discuss goals and establish care plan in the next 7-14 days.        PCP/Specialist follow up:   Future Appointments   Date Time Provider Rocio Hardy   6/4/2020  2:40 PM [facility]

## 2020-01-14 ENCOUNTER — PATIENT OUTREACH (OUTPATIENT)
Dept: FAMILY MEDICINE CLINIC | Age: 35
End: 2020-01-14

## 2020-01-20 NOTE — PROGRESS NOTES
Ambulatory Care Management Note      Date/Time:  1/20/2020 4:34 PM    Top Challenges reviewed with the provider   · Tracey Vasquez 477 Maintenance topics  · No ACP on File  · HD [facility]S schedule at [facility], -access: right arm AVF, HTN-often related to interdialytic fluid gains  · Decline PCP follow up at this time       Ambulatory  contacted patient for discussion and case management of Lupus, ESRD on dialysis, etc. Recent death of brother-? OD. Summary of patients top problems:   1. Chronic pain r/t Lupus and lumbar disc degeneration- patient follows up with Dr. Alexsandra Dunn (Neurology), continues to have low back pain. Taking amitriptyline to help with sleep. Continues on her percocet, agreeable to start tapering it off.  2. CRF/HTN- Continues on dialysis, lab monitoring by dialysis center. Dialysis on Tu, Thur, Sat.  3. ACP- Patient has had discussion, states, her mother is her decision maker , she is a full code, not interested in further discussion at this time. PCP/Specialist follow up:   Future Appointments   Date Time Provider Rocio Hardy   6/4/2020  2:40 PM MD Shanna Mclain 27          Goals    None          Patient verbalized understanding of all information discussed. Patient has this Nurse Navigators contact information for any further questions, concerns, or needs.

## 2020-02-07 ENCOUNTER — PATIENT OUTREACH (OUTPATIENT)
Dept: FAMILY MEDICINE CLINIC | Age: 35
End: 2020-02-07

## 2020-02-11 NOTE — PROGRESS NOTES
Goals Addressed                 This Visit's Progress       Patient Stated     COMPLETED: Patient/Family verbalizes understanding of self-management of  disease. (pt-stated)        4/25/18-   · Patient verbalized understanding of plan of care, attending dialysis and appointments as scheduled. · [facility] pk    4/30/18-   · Patient educated on diet restrictions at discharge,  · NN will continue attempts to reach for follow-up assessment and educate on diet restrictions. · Patient attending dialysis at this time on 5/1/18. pk  ·   07/23/18   · H-Dialysis continure on TTH and Sat, Patient verbalized understanding pk  · Patient verbalized decline for home care at this time. pk    5/21/18-   · discharge instructions and medication reconciliation reviewed. · Patient verbalized understanding of plan of care. pk  6/29/18 Reviewed discharge instructions and medications with patient. Reminded to call Lokesh Azul, for f/u appt per hospital rec. Also to schedule f/u with surgeon, , for 2 weeks, and Dr.Deep Feliz(neph) for one week. NN sched SURJIT with pcp, will see  on 7/2 at 11am.mbt    07/23/18   · Verbalized understanding of disease process, when and how to call for help. · NN reviewed discharge instructions and ask patient to log her BPs in log book to bring to office. · NN will follow up in one week. pk    07/27/18   · Patient sent to [facility] for SOB during Dialysis on 7/26/18. Patient without acute distress on admission to ED. O2 sat at 96%. · Patient discharged home-no change in plan of care  · NN will follow in one week. pk  8/21/18- [facility] 8/15-8/20. Nazanin Hines 09/20/18   · Reminded to check bp qd and record results. · Notify NN/provider of elevated levels- > 170/90. · NN will follow in one week. pk  10/16/18  · Verbalized BP is much better controlled. · Not keeping log as instructed. · Denies SOB. Admits to adherence to plan.  pk  · NN will follow in one week. pk  10/31/18  · [facility] Ed on 10/30/18 for chest pain and SOB  · Elevated /112- HR-115  · Patient did not check BP today, encouraged to check and NN will call back  · Returned call at 11:45 Am- BP -103/87, HR-99  · Verbalized willingness to check BP and record daily, Notify Provider if >170/90  · Patient will report and have baseline BP  · NN will follow in one week. pk  11/07/18  · Verbalized doing better, no further episodes of chest pain  · BP remains at stable range of 120-140 sys  · Did not document today. · NN will follow in one week. pk  .11/28/18  · Patient had PCP follow-up as scheduled  · Reports doing well  · Continues dialysis. · NN will follow in one week. pk     .    . Other     Patient/Family verbalizes understanding of self-management of chronic disease. 1/14/2020  · Outreach to patient to complete ACM assessment for chronic disease management-admits to feeling good  · Patient verbalized understanding of why referred to care management services and reason for call  · ACM explored barriers that might be impacting patient's ability to participate in care management- following up with pain management for chronic pain  · Patient admits to takng all medications as ordered, Medication review completed. · Denies constipation, including fiber in meals  · Plans follow up with Neurology on 6/4/20  · Denies financial, social or behavioral barriers  · Reminded of scheduled follow-up appointment for overdue health maintenance topics, patient will schedule due to her dialysis and transportation. · Try to limit how much sodium you eat to less than 2,300 milligrams (mg) a day. Your doctor may ask you to try to eat less than 1,500 mg a day. · Eat plenty of fruits (such as bananas and oranges), vegetables, legumes, whole grains, and low-fat dairy products. · Lower the amount of saturated fat in your diet. Saturated fat is found in animal products such as milk, cheese, and meat.  Limiting these foods may help you lose weight and also lower your risk for heart disease. Plan for Next Visit:  1. Assess compliance with medications and understanding  2. Assess understanding of plan of care and health-seeking behavior changes  3. Assess need for education and barriers to care  Evaluation:   ACM To follow up in seven-ten days   Patient given contact information for ACM, and office number for twenty-four hour on call coverage ([phone]) pk.    2/7/2020  · ACM outreach to complete assessment of HTN, CRD, and self management  · Admits to feeling good  · Continue Dialysis  schedule as Tue, [address], Sat. · Has schedule follow up with Dr. Marcelle Ríos scheduled on 6/4/2020  · Follows up with renal during dialysis weekly  · BP ranges 130s-140s, encouraged  To keep a log  · Reminded of importance of PCP follow up, decline at this time  · Denies financial, social or behavioral barriers  · ACM will follow in 7-10 days. pk          Current Outpatient Medications   Medication Sig    amitriptyline (ELAVIL) 50 mg tablet Take 2 Tabs by mouth nightly for 90 days. Antidepressant for Sleep Difficulty    apixaban (ELIQUIS) 5 mg tablet Take 5 mg by mouth every twelve (12) hours.  pantoprazole (PROTONIX) 40 mg tablet Take 1 Tab by mouth ACB/HS.  minoxidil (LONITEN) 2.5 mg tablet Take 5 mg by mouth daily.  losartan (COZAAR) 100 mg tablet Take 100 mg by mouth daily.  hydroxychloroquine (PLAQUENIL) 200 mg tablet TAKE 2 TABLETS BY MOUTH DAILY    ferric citrate (AURYXIA) 210 mg iron tablet Take 420 mg by mouth three (3) times daily (with meals).  carvedilol (COREG) 25 mg tablet Take 1 Tab by mouth two (2) times daily (with meals).  amLODIPine (NORVASC) 10 mg tablet Take 1 Tab by mouth daily.  predniSONE (DELTASONE) 5 mg tablet Take 2 Tabs by mouth daily.  gemfibrozil (LOPID) 600 mg tablet Take 300 mg by mouth daily.  azaTHIOprine (IMURAN) 50 mg tablet Take 50 mg by mouth daily (after breakfast). No current facility-administered medications for this visit.

## 2020-02-22 DIAGNOSIS — G47.00 INSOMNIA, UNSPECIFIED TYPE: ICD-10-CM

## 2020-02-24 RX ORDER — AMITRIPTYLINE HYDROCHLORIDE 50 MG/1
TABLET, FILM COATED ORAL
Qty: 30 TAB | Refills: 5 | Status: SHIPPED | OUTPATIENT
Start: 2020-02-24 | End: 2020-06-04

## 2020-03-14 ENCOUNTER — HOSPITAL ENCOUNTER (INPATIENT)
Age: 35
LOS: 2 days | Discharge: HOME | DRG: 871 | End: 2020-03-16
Attending: EMERGENCY MEDICINE | Admitting: INTERNAL MEDICINE
Payer: MEDICARE

## 2020-03-14 ENCOUNTER — APPOINTMENT (OUTPATIENT)
Dept: CT IMAGING | Age: 35
DRG: 871 | End: 2020-03-14
Attending: EMERGENCY MEDICINE
Payer: MEDICARE

## 2020-03-14 ENCOUNTER — APPOINTMENT (OUTPATIENT)
Dept: GENERAL RADIOLOGY | Age: 35
DRG: 871 | End: 2020-03-14
Attending: EMERGENCY MEDICINE
Payer: MEDICARE

## 2020-03-14 DIAGNOSIS — Z99.2 ESRD ON HEMODIALYSIS (HCC): ICD-10-CM

## 2020-03-14 DIAGNOSIS — N18.6 ESRD ON HEMODIALYSIS (HCC): ICD-10-CM

## 2020-03-14 DIAGNOSIS — J18.9 MULTIFOCAL PNEUMONIA: Primary | ICD-10-CM

## 2020-03-14 LAB
ALBUMIN SERPL-MCNC: 3.4 G/DL (ref 3.5–5)
ALBUMIN/GLOB SERPL: 0.6 {RATIO} (ref 1.1–2.2)
ALP SERPL-CCNC: 63 U/L (ref 45–117)
ALT SERPL-CCNC: 11 U/L (ref 12–78)
ANION GAP SERPL CALC-SCNC: 19 MMOL/L (ref 5–15)
AST SERPL-CCNC: 17 U/L (ref 15–37)
B PERT DNA SPEC QL NAA+PROBE: NOT DETECTED
BASOPHILS # BLD: 0 K/UL (ref 0–0.1)
BASOPHILS NFR BLD: 0 % (ref 0–1)
BILIRUB SERPL-MCNC: 0.6 MG/DL (ref 0.2–1)
BORDETELLA PARAPERTUSSIS PCR, BORPAR: NOT DETECTED
BUN SERPL-MCNC: 37 MG/DL (ref 6–20)
BUN/CREAT SERPL: 4 (ref 12–20)
C PNEUM DNA SPEC QL NAA+PROBE: NOT DETECTED
CALCIUM SERPL-MCNC: 9.7 MG/DL (ref 8.5–10.1)
CHLORIDE SERPL-SCNC: 90 MMOL/L (ref 97–108)
CO2 SERPL-SCNC: 26 MMOL/L (ref 21–32)
CREAT SERPL-MCNC: 9.25 MG/DL (ref 0.55–1.02)
DIFFERENTIAL METHOD BLD: ABNORMAL
EOSINOPHIL # BLD: 0 K/UL (ref 0–0.4)
EOSINOPHIL NFR BLD: 0 % (ref 0–7)
ERYTHROCYTE [DISTWIDTH] IN BLOOD BY AUTOMATED COUNT: 14.2 % (ref 11.5–14.5)
FLUAV AG NPH QL IA: NEGATIVE
FLUAV H1 2009 PAND RNA SPEC QL NAA+PROBE: NOT DETECTED
FLUAV H1 RNA SPEC QL NAA+PROBE: NOT DETECTED
FLUAV H3 RNA SPEC QL NAA+PROBE: NOT DETECTED
FLUAV SUBTYP SPEC NAA+PROBE: NOT DETECTED
FLUBV AG NOSE QL IA: NEGATIVE
FLUBV RNA SPEC QL NAA+PROBE: NOT DETECTED
GLOBULIN SER CALC-MCNC: 5.6 G/DL (ref 2–4)
GLUCOSE SERPL-MCNC: 104 MG/DL (ref 65–100)
HADV DNA SPEC QL NAA+PROBE: NOT DETECTED
HCOV 229E RNA SPEC QL NAA+PROBE: NOT DETECTED
HCOV HKU1 RNA SPEC QL NAA+PROBE: NOT DETECTED
HCOV NL63 RNA SPEC QL NAA+PROBE: NOT DETECTED
HCOV OC43 RNA SPEC QL NAA+PROBE: NOT DETECTED
HCT VFR BLD AUTO: 28.1 % (ref 35–47)
HGB BLD-MCNC: 8.8 G/DL (ref 11.5–16)
HMPV RNA SPEC QL NAA+PROBE: NOT DETECTED
HPIV1 RNA SPEC QL NAA+PROBE: NOT DETECTED
HPIV2 RNA SPEC QL NAA+PROBE: NOT DETECTED
HPIV3 RNA SPEC QL NAA+PROBE: NOT DETECTED
HPIV4 RNA SPEC QL NAA+PROBE: NOT DETECTED
IMM GRANULOCYTES # BLD AUTO: 0.1 K/UL (ref 0–0.04)
IMM GRANULOCYTES NFR BLD AUTO: 1 % (ref 0–0.5)
LACTATE SERPL-SCNC: 1 MMOL/L (ref 0.4–2)
LYMPHOCYTES # BLD: 0.7 K/UL (ref 0.8–3.5)
LYMPHOCYTES NFR BLD: 5 % (ref 12–49)
M PNEUMO DNA SPEC QL NAA+PROBE: NOT DETECTED
MCH RBC QN AUTO: 30 PG (ref 26–34)
MCHC RBC AUTO-ENTMCNC: 31.3 G/DL (ref 30–36.5)
MCV RBC AUTO: 95.9 FL (ref 80–99)
MONOCYTES # BLD: 0.8 K/UL (ref 0–1)
MONOCYTES NFR BLD: 6 % (ref 5–13)
NEUTS SEG # BLD: 11.7 K/UL (ref 1.8–8)
NEUTS SEG NFR BLD: 88 % (ref 32–75)
NRBC # BLD: 0 K/UL (ref 0–0.01)
NRBC BLD-RTO: 0 PER 100 WBC
PLATELET # BLD AUTO: 243 K/UL (ref 150–400)
PMV BLD AUTO: 10.5 FL (ref 8.9–12.9)
POTASSIUM SERPL-SCNC: 4.1 MMOL/L (ref 3.5–5.1)
PROT SERPL-MCNC: 9 G/DL (ref 6.4–8.2)
RBC # BLD AUTO: 2.93 M/UL (ref 3.8–5.2)
RBC MORPH BLD: ABNORMAL
RSV RNA SPEC QL NAA+PROBE: NOT DETECTED
RV+EV RNA SPEC QL NAA+PROBE: NOT DETECTED
SODIUM SERPL-SCNC: 135 MMOL/L (ref 136–145)
WBC # BLD AUTO: 13.3 K/UL (ref 3.6–11)

## 2020-03-14 PROCEDURE — 74011000258 HC RX REV CODE- 258: Performed by: EMERGENCY MEDICINE

## 2020-03-14 PROCEDURE — 65660000000 HC RM CCU STEPDOWN

## 2020-03-14 PROCEDURE — 96365 THER/PROPH/DIAG IV INF INIT: CPT

## 2020-03-14 PROCEDURE — 74011250636 HC RX REV CODE- 250/636: Performed by: EMERGENCY MEDICINE

## 2020-03-14 PROCEDURE — 71250 CT THORAX DX C-: CPT

## 2020-03-14 PROCEDURE — 71046 X-RAY EXAM CHEST 2 VIEWS: CPT

## 2020-03-14 PROCEDURE — 74011250637 HC RX REV CODE- 250/637: Performed by: EMERGENCY MEDICINE

## 2020-03-14 PROCEDURE — 96367 TX/PROPH/DG ADDL SEQ IV INF: CPT

## 2020-03-14 PROCEDURE — 85025 COMPLETE CBC W/AUTO DIFF WBC: CPT

## 2020-03-14 PROCEDURE — 93005 ELECTROCARDIOGRAM TRACING: CPT

## 2020-03-14 PROCEDURE — 99285 EMERGENCY DEPT VISIT HI MDM: CPT

## 2020-03-14 PROCEDURE — 87040 BLOOD CULTURE FOR BACTERIA: CPT

## 2020-03-14 PROCEDURE — 0100U RESPIRATORY PANEL,PCR,NASOPHARYNGEAL: CPT

## 2020-03-14 PROCEDURE — 87804 INFLUENZA ASSAY W/OPTIC: CPT

## 2020-03-14 PROCEDURE — 94762 N-INVAS EAR/PLS OXIMTRY CONT: CPT

## 2020-03-14 PROCEDURE — 83605 ASSAY OF LACTIC ACID: CPT

## 2020-03-14 PROCEDURE — 80053 COMPREHEN METABOLIC PANEL: CPT

## 2020-03-14 PROCEDURE — 36415 COLL VENOUS BLD VENIPUNCTURE: CPT

## 2020-03-14 RX ORDER — LEVOFLOXACIN 5 MG/ML
750 INJECTION, SOLUTION INTRAVENOUS
Status: COMPLETED | OUTPATIENT
Start: 2020-03-14 | End: 2020-03-14

## 2020-03-14 RX ORDER — SODIUM CHLORIDE 0.9 % (FLUSH) 0.9 %
5-40 SYRINGE (ML) INJECTION AS NEEDED
Status: DISCONTINUED | OUTPATIENT
Start: 2020-03-14 | End: 2020-03-16 | Stop reason: HOSPADM

## 2020-03-14 RX ORDER — SODIUM CHLORIDE 0.9 % (FLUSH) 0.9 %
5-40 SYRINGE (ML) INJECTION EVERY 8 HOURS
Status: DISCONTINUED | OUTPATIENT
Start: 2020-03-14 | End: 2020-03-16 | Stop reason: HOSPADM

## 2020-03-14 RX ORDER — ACETAMINOPHEN 500 MG
1000 TABLET ORAL
Status: COMPLETED | OUTPATIENT
Start: 2020-03-14 | End: 2020-03-14

## 2020-03-14 RX ORDER — ONDANSETRON 2 MG/ML
4 INJECTION INTRAMUSCULAR; INTRAVENOUS
Status: DISCONTINUED | OUTPATIENT
Start: 2020-03-14 | End: 2020-03-14

## 2020-03-14 RX ORDER — ACETAMINOPHEN 325 MG/1
650 TABLET ORAL
Status: DISCONTINUED | OUTPATIENT
Start: 2020-03-14 | End: 2020-03-16 | Stop reason: HOSPADM

## 2020-03-14 RX ADMIN — ACETAMINOPHEN 1000 MG: 500 TABLET ORAL at 20:06

## 2020-03-14 RX ADMIN — LEVOFLOXACIN 750 MG: 5 INJECTION, SOLUTION INTRAVENOUS at 21:32

## 2020-03-14 RX ADMIN — PIPERACILLIN AND TAZOBACTAM 3.38 G: 3; .375 INJECTION, POWDER, LYOPHILIZED, FOR SOLUTION INTRAVENOUS at 20:55

## 2020-03-14 NOTE — ED PROVIDER NOTES
The patient is a 62-year-old female with a past medical history significant for anemia, asthma, carditis, CKD and hemodialysis dependent on  and , hypercholesterolemia, hypertension, hyperemesis, lupus, malignant hypertension, who presents to the ED with a complaint of dry cough for approximately 2 to 3 days, runny nose, congestion, shortness of breath with coughing, fever and headaches. The patient denies any nausea or vomiting, sore throat, blurry vision, earaches, neck and back pain, abdominal pain, diarrhea, constipation, dysuria, dizziness, extremity weakness numbness, sick contact, skin rash, recent travel           Past Medical History:   Diagnosis Date    Anemia     secondary to lupus    Asthma     no inhaler use in past 2 to 3 years    Carditis     Chronic kidney disease     ESRD    Chronic pain     DDD (degenerative disc disease), lumbar     ESRD (end stage renal disease) ([address]     GERD (gastroesophageal reflux disease)     Hemod[facility] ([facility] 2017    [address]  Tuesday,  Thursday,  and Saturday. 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  Poor historian 2018    With medications    Thromboembolus ([facility] 2013    lungs    Transfusion history     Last Transfusion 2017  at [facility]       Past Surgical History:   Procedure Laterality Date    HX  SECTION  11/2006    x1    HX OTHER SURGICAL  9/16/15    INSERTION PD CATH;  Removed 2017    HX VASCULAR ACCESS Right 2017    Double-Lumen henry catheter upper chest    HX VASCULAR ACCESS Right 2018    right upper arm         Family History:   Problem Relation Age of Onset    Diabetes Father     Hypertension Father     Cancer Other         aunt with breast cancer    Diabetes Mother        Social History     Socioeconomic History    Marital status: SINGLE     Spouse name: Not on file    Number of children: Not on file    Years of education: Not on file    Highest education level: Not on file   Occupational History    Not on file   Social Needs    Financial resource strain: Not on file    Food insecurity     Worry: Not on file     Inability: Not on file    Transportation needs     Medical: Not on file     Non-medical: Not on file   Tobacco Use    Smoking status: Never Smoker    Smokeless tobacco: Never Used   Substance and Sexual Activity    Alcohol use: No    Drug use: Yes     Types: Prescription, OTC    Sexual activity: Not Currently   Lifestyle    Physical activity     Days per week: Not on file     Minutes per session: Not on file    Stress: Not on file   Relationships    Social connections     Talks on phone: Not on file     Gets together: Not on file     Attends [employer] service: Not on file     Active member of club or organization: Not on file     Attends meetings of clubs or organizations: Not on file     Relationship status: Not on file    Intimate partner violence     Fear of current or ex partner: Not on file     Emotionally abused: Not on file     Physically abused: Not on file     Forced sexual activity: Not on file   Other Topics Concern     Service No    Blood Transfusions No    Caffeine Concern No    Occupational Exposure No    Hobby Hazards No    Sleep Concern No    Stress Concern No    Weight Concern No    Special Diet No    Back Care Yes     Comment: low back pain    Exercise No    Bike Helmet No    Seat Belt Yes    Self-Exams No   Social History Narrative    Lives with parents and daughter.          ALLERGIES: Compazine [prochlorperazine edisylate]    Review of Systems   All other systems reviewed and are negative. Vitals:    03/14/20 1923   BP: 121/72   Pulse: (!) 122   Resp: 16   Temp: (!) 102.8 °F (39.3 °C)   SpO2: 94%   Weight: 90.1 kg (198 lb 10.2 oz)   Height: 5' 5\" (1.651 m)            Physical Exam  Vitals signs and nursing note reviewed. Exam conducted with a chaperone present. CONSTITUTIONAL: Well-appearing; well-nourished; in no apparent distress  HEAD: Normocephalic; atraumatic  EYES: PERRL; EOM intact; conjunctiva and sclera are clear bilaterally. ENT: No rhinorrhea; normal pharynx with no tonsillar hypertrophy; mucous membranes pink/moist, no erythema, no exudate. NECK: Supple; non-tender; no cervical lymphadenopathy  CARD: Normal S1, S2; no murmurs, rubs, or gallops. Regular rate and rhythm. RESP: Normal respiratory effort; breath sounds clear and equal bilaterally; no wheezes, rhonchi, or rales. ABD: Normal bowel sounds; non-distended; non-tender; no palpable organomegaly, no masses, no bruits. Back Exam: Normal inspection; no vertebral point tenderness, no CVA tenderness. Normal range of motion. EXT: Normal ROM in all four extremities; non-tender to palpation; no swelling or deformity; distal pulses are normal, no edema. SKIN: Warm; dry; no rash. NEURO:Alert and oriented x 3, coherent, SKPI-XII grossly intact, sensory and motor are non-focal.      MDM  Number of Diagnoses or Management Options  Diagnosis management comments: Assessment: 71-year-old female who presents to the ED with fever, URI symptoms and general malaise consistent with viral syndrome. However the patient is high risk for sepsis and occult bacteremia and will require septic work-up at this time. Plan: Lab/EKG/chest x-ray/antipyretic/serial exam/ Monitor and Reevaluate.          Amount and/or Complexity of Data Reviewed  Clinical lab tests: ordered and reviewed  Tests in the radiology section of CPT®: ordered and reviewed  Tests in the medicine section of CPT®: reviewed and ordered  Discussion of test results with the performing providers: yes  Decide to obtain previous medical records or to obtain history from someone other than the patient: yes  Obtain history from someone other than the patient: yes  Review and summarize past medical records: yes  Discuss the patient with other providers: yes  Independent visualization of images, tracings, or specimens: yes    Risk of Complications, Morbidity, and/or Mortality  Presenting problems: moderate  Diagnostic procedures: moderate  Management options: moderate           Procedures    ED EKG interpretation:  Rhythm: sinus tachycardia; and regular . Rate (approx.): 116; Axis: normal; P wave: normal; QRS interval: normal ; ST/T wave: non-specific changes; in  Lead: Diffusely; Other findings: borderline ekg. This EKG was interpreted by Cynthia Hernández MD,ED Provider. XRAY INTERPRETATION (ED MD)  Chest Xray  Left lower lobe pneumonia. Normal heart size. No bony abnormalities. Silvana Rodriguez MD 7:41 PM    PROGRESS NOTE:  Pt has been reexamined by Silvana Rodriguez MD all available results have been reviewed with pt and any available family. Pt understands sx, dx, and tx in ED. Care plan has been outlined and questions have been answered. Pt and any available family understands and agrees to need for admission to hospital for further tx not available in ED. Pt is ready for admission. Written by Cynthia Hernández MD,  9:58 PM    Hospitalist Perfect Serve for Admission  9:58 PM    ED Room Number: MND11/85  Patient Name and age:  Amanda Santillan 30 y.o.  female  Working Diagnosis:   1. Multifocal pneumonia    2. ESRD on hemodialysis [facility])      Readmission: no  Isolation Requirements:  yes  Recommended Level of Care:  telemetry  Code Status:  Full Code  Department:[place] ED - [phone]  Other: The patient has a history of chronic respiratory and healthcare associated pneumonia. She is hemodynamically stable. Respiratory panel still pending.   There is no history of sick contact, travel at this time. CONSULT NOTE:  Katy Boland MD spoke with Dr. Lenore Wyman of the adult hospitalist team. Discussed patient's presentation, history, physical assessment, and available diagnostic results.  He will evaluate, write orders and admit the patient to the hospital. 9:59 PM    .

## 2020-03-14 NOTE — ED TRIAGE NOTES
Pt. Complains of cough since Tuesday and headache as well, with body aches. Pt. Denies any fever and states she did not have one on Wed or Friday. Pt. Took tylenol today.   Pt. Is on dialysis

## 2020-03-15 LAB
ATRIAL RATE: 116 BPM
CALCULATED P AXIS, ECG09: 52 DEGREES
CALCULATED R AXIS, ECG10: 43 DEGREES
CALCULATED T AXIS, ECG11: 34 DEGREES
DIAGNOSIS, 93000: NORMAL
P-R INTERVAL, ECG05: 124 MS
Q-T INTERVAL, ECG07: 328 MS
QRS DURATION, ECG06: 84 MS
QTC CALCULATION (BEZET), ECG08: 455 MS
VENTRICULAR RATE, ECG03: 116 BPM

## 2020-03-15 PROCEDURE — 74011250637 HC RX REV CODE- 250/637: Performed by: INTERNAL MEDICINE

## 2020-03-15 PROCEDURE — 65660000000 HC RM CCU STEPDOWN

## 2020-03-15 PROCEDURE — 74011250636 HC RX REV CODE- 250/636: Performed by: INTERNAL MEDICINE

## 2020-03-15 PROCEDURE — 74011250636 HC RX REV CODE- 250/636: Performed by: HOSPITALIST

## 2020-03-15 PROCEDURE — 74011000258 HC RX REV CODE- 258: Performed by: INTERNAL MEDICINE

## 2020-03-15 PROCEDURE — 74011636637 HC RX REV CODE- 636/637: Performed by: INTERNAL MEDICINE

## 2020-03-15 RX ORDER — MINOXIDIL 2.5 MG/1
5 TABLET ORAL DAILY
Status: DISCONTINUED | OUTPATIENT
Start: 2020-03-15 | End: 2020-03-16 | Stop reason: HOSPADM

## 2020-03-15 RX ORDER — PREDNISONE 10 MG/1
10 TABLET ORAL DAILY
Status: DISCONTINUED | OUTPATIENT
Start: 2020-03-15 | End: 2020-03-16 | Stop reason: HOSPADM

## 2020-03-15 RX ORDER — CARVEDILOL 12.5 MG/1
25 TABLET ORAL 2 TIMES DAILY WITH MEALS
Status: DISCONTINUED | OUTPATIENT
Start: 2020-03-15 | End: 2020-03-16 | Stop reason: HOSPADM

## 2020-03-15 RX ORDER — AMLODIPINE BESYLATE 5 MG/1
5 TABLET ORAL DAILY
Status: DISCONTINUED | OUTPATIENT
Start: 2020-03-15 | End: 2020-03-16 | Stop reason: HOSPADM

## 2020-03-15 RX ORDER — SODIUM CHLORIDE 9 MG/ML
40 INJECTION, SOLUTION INTRAVENOUS CONTINUOUS
Status: DISCONTINUED | OUTPATIENT
Start: 2020-03-15 | End: 2020-03-16 | Stop reason: HOSPADM

## 2020-03-15 RX ORDER — AMITRIPTYLINE HYDROCHLORIDE 25 MG/1
25 TABLET, FILM COATED ORAL
Status: DISCONTINUED | OUTPATIENT
Start: 2020-03-15 | End: 2020-03-16 | Stop reason: HOSPADM

## 2020-03-15 RX ORDER — LOSARTAN POTASSIUM 50 MG/1
100 TABLET ORAL DAILY
Status: DISCONTINUED | OUTPATIENT
Start: 2020-03-15 | End: 2020-03-16 | Stop reason: HOSPADM

## 2020-03-15 RX ORDER — VANCOMYCIN 2 GRAM/500 ML IN 0.9 % SODIUM CHLORIDE INTRAVENOUS
2000 ONCE
Status: COMPLETED | OUTPATIENT
Start: 2020-03-15 | End: 2020-03-15

## 2020-03-15 RX ORDER — GEMFIBROZIL 600 MG/1
300 TABLET, FILM COATED ORAL DAILY
Status: DISCONTINUED | OUTPATIENT
Start: 2020-03-15 | End: 2020-03-16 | Stop reason: HOSPADM

## 2020-03-15 RX ORDER — PANTOPRAZOLE SODIUM 40 MG/1
40 TABLET, DELAYED RELEASE ORAL
Status: DISCONTINUED | OUTPATIENT
Start: 2020-03-15 | End: 2020-03-16 | Stop reason: HOSPADM

## 2020-03-15 RX ADMIN — VANCOMYCIN HYDROCHLORIDE 2000 MG: 10 INJECTION, POWDER, LYOPHILIZED, FOR SOLUTION INTRAVENOUS at 10:00

## 2020-03-15 RX ADMIN — AMITRIPTYLINE HYDROCHLORIDE 25 MG: 25 TABLET, FILM COATED ORAL at 20:08

## 2020-03-15 RX ADMIN — PIPERACILLIN AND TAZOBACTAM 3.38 G: 3; .375 INJECTION, POWDER, LYOPHILIZED, FOR SOLUTION INTRAVENOUS at 20:09

## 2020-03-15 RX ADMIN — PIPERACILLIN AND TAZOBACTAM 3.38 G: 3; .375 INJECTION, POWDER, LYOPHILIZED, FOR SOLUTION INTRAVENOUS at 09:08

## 2020-03-15 RX ADMIN — ACETAMINOPHEN 650 MG: 325 TABLET ORAL at 23:57

## 2020-03-15 RX ADMIN — GEMFIBROZIL 300 MG: 600 TABLET ORAL at 09:06

## 2020-03-15 RX ADMIN — FERRIC CITRATE 420 MG: 210 TABLET, COATED ORAL at 17:57

## 2020-03-15 RX ADMIN — PREDNISONE 10 MG: 10 TABLET ORAL at 09:05

## 2020-03-15 RX ADMIN — APIXABAN 5 MG: 5 TABLET, FILM COATED ORAL at 09:05

## 2020-03-15 RX ADMIN — APIXABAN 5 MG: 5 TABLET, FILM COATED ORAL at 20:08

## 2020-03-15 RX ADMIN — PANTOPRAZOLE SODIUM 40 MG: 40 TABLET, DELAYED RELEASE ORAL at 20:08

## 2020-03-15 RX ADMIN — FERRIC CITRATE 420 MG: 210 TABLET, COATED ORAL at 11:08

## 2020-03-15 RX ADMIN — FERRIC CITRATE 420 MG: 210 TABLET, COATED ORAL at 09:07

## 2020-03-15 RX ADMIN — PANTOPRAZOLE SODIUM 40 MG: 40 TABLET, DELAYED RELEASE ORAL at 09:05

## 2020-03-15 RX ADMIN — SODIUM CHLORIDE 40 ML/HR: 900 INJECTION, SOLUTION INTRAVENOUS at 17:57

## 2020-03-15 RX ADMIN — ACETAMINOPHEN 650 MG: 325 TABLET ORAL at 11:08

## 2020-03-15 NOTE — PROGRESS NOTES
Problem: Pneumonia: Day 1  Goal: Off Pathway (Use only if patient is Off Pathway)  Outcome: Progressing Towards Goal  Goal: Activity/Safety  Outcome: Progressing Towards Goal  Goal: Consults, if ordered  Outcome: Progressing Towards Goal  Goal: Diagnostic Test/Procedures  Outcome: Progressing Towards Goal  Goal: Nutrition/Diet  Outcome: Progressing Towards Goal  Goal: Medications  Outcome: Progressing Towards Goal  Goal: Respiratory  Outcome: Progressing Towards Goal  Goal: Treatments/Interventions/Procedures  Outcome: Progressing Towards Goal  Goal: Psychosocial  Outcome: Progressing Towards Goal  Goal: *Oxygen saturation within defined limits  Outcome: Progressing Towards Goal  Goal: *Influenza vaccine administered (October-March)  Outcome: Progressing Towards Goal  Goal: *Pneumoccocal vaccine administered  Outcome: Progressing Towards Goal  Goal: *Hemodynamically stable  Outcome: Progressing Towards Goal  Goal: *Demonstrates progressive activity  Outcome: Progressing Towards Goal  Goal: *Tolerating diet  Outcome: Progressing Towards Goal

## 2020-03-15 NOTE — H&P
[address]   HISTORY AND PHYSICAL    Name:  Maria E Silva  MR#:  [id number]  :  1985  ACCOUNT #:  [de-identified]  ADMIT DATE:  2020      PRIMARY CARE PROVIDER:  Hussein Ball NP    CHIEF COMPLAINT:  Cough, shortness of breath, and fever. HISTORY OF PRESENT ILLNESS:  The patient is a 71-year-old female with a history of end-stage renal failure and lupus who presented to the emergency department complaining of difficulty breathing and fever. Symptoms started about 4 or 5 days ago with flu symptoms including runny nose and dry cough followed by chills 2 or 3 days ago. She has also been feeling increasingly more short of breath and had fever. She has no significant nausea or vomiting and denies abdominal pain or diarrhea. She has had an intermittent headache and is now complaining of generalized body aches. She has had no significant dizziness, joint pains, rashes, or bleeding. Denies shortness of breath at rest at this time. There is no report of recent travel or contact with sick persons. There is nobody sick at home currently. PAST MEDICAL HISTORY:  Includes systemic lupus, end-stage renal failure; on hemodialysis ,  and , anemia, chronic pain, pulmonary embolism, and seizures. MEDICATIONS:  List includes,  1. Elavil 50 mg at bedtime. 2.  Norvasc 10 mg daily. 3.  Eliquis 5 mg twice a day. 4.  Imuran 50 mg daily. 5.  Coreg 25 mg twice a day. 6.  Iron tablets. 7.  Gemfibrozil 600 mg tablet half a tablet daily. 8.  Plaquenil 200 mg 2 tablets daily. 9.  Cozaar 100 mg daily. 10.  Minoxidil 2.5 mg 2 tablets daily. 11.  Protonix 40 mg twice a day. 12.  Prednisone 10 mg daily. ALLERGIES:  INCLUDE COMPAZINE. FAMILY HISTORY:  Includes diabetes and hypertension. PAST SURGICAL HISTORY:  Includes . She has a right arm vascular access. REVIEW OF SYSTEMS:  The patient denies recent weight changes or leg edema.   She still makes some urine. Denies sore throat. There is no report of vision changes, neck pain, or pleuritic pain. All other systems reviewed and unremarkable. PHYSICAL EXAMINATION:  GENERAL:  The patient is a young female in no significant respiratory distress at rest.  VITAL SIGNS:  Temperature initially was 102.8, heart rate 122, blood pressure 121/72, and oxygen saturation 94% on room air. HEAD:  Atraumatic. Pupils reactive to light. Oral mucosa is somewhat dry. NECK:  Appears supple. LUNGS:  Have diminished breath sounds at the bases, otherwise fairly clear. HEART:  Rhythm is regular. ABDOMEN:  Soft and nontender without masses. EXTREMITIES:  Lower extremity examination shows no pitting edema or signs of DVT. NEUROLOGIC:  Nonfocal.  She is alert and oriented. LABORATORY DATA:  White blood cell count 13.3, hemoglobin 8.8, platelet count 747. Sodium 135, potassium 4.1, chloride 90, CO2 is 26, glucose 104, BUN 37, creatinine 9.2, and lactic acid 1.0. Respiratory panel is negative. Influenza negative. RSV negative. Blood cultures have been obtained. Chest CT scan shows multifocal areas of airspace disease with the largest in the left lower lobe. The EKG shows sinus tachycardia. ASSESSMENT:  1. Airspace disease, fever, and respiratory symptoms consistent with multifocal pneumonia in an immunosuppressed patient. The etiology of the pneumonia is not clear at this time. The patient certainly is at risk for COVID-19. Her blood pressure is on the low side, but she reports that this is the norm for her. She has only mild respiratory distress and does not need supplemental oxygen at this time. 2.  End-stage renal disease, on hemodialysis. 3.  Anemia. 4.  Systemic lupus, on immunosuppressant therapy. 5.  History of pulmonary embolism. PLAN:  The patient is admitted to the hospital for further management. We will continue her on the empiric antibiotics started in the emergency department. Will need to follow up on the blood cultures and the microbiology tests that were sent out. Watch hemodynamic status closely. We will consult Nephrology for assistance in managing her hemodialysis. We will need to cut back on the immunosuppressants while treating her active infection. DVT prophylaxis is already achieved with her regular Eliquis. She is expected to return to home upon discharge. Further evaluation as indicated. She will be monitored on telemetry for now and is being admitted to the ICU for overflow. Maintain air-bone precautions for now.       Jhoan Schmidt MD      [id number]  D:  03/15/2020 5:46  T:  03/15/2020 7:36  JOB #:  [id number]  CC:  Nader Sharma NP

## 2020-03-15 NOTE — ED NOTES
Patient ECG stickers replaced. Patient denies any further complaints. VSS. Light dimmed for comfort. Will continue to monitor.

## 2020-03-15 NOTE — PROGRESS NOTES
198630- consult dictated    ESRD ([facility] [address]; MWF)  SLE  HTN- BP low nl to low  SHPT  Anemia of ESRD  Fever/SOB/Cough- CT chest with multifocal PNA. Covid- 19 is in the differential    P:  HD tomm per schedule, unless todays rpt labs shows high K  Gentle IV NS at 40 ml/hr  Ct to hold BP meds. If BP rises, slowly reintroduce with Coreg as 1st choice given mild tachycardia  Retacrit  Auryxia   ABx  Culture  Isolation  Reduce immuno's- Immuran on hold.      D/W pt, RN and Dr. Qing Monsivais MD  [facility]

## 2020-03-15 NOTE — PROGRESS NOTES
Reason for Admission:   Multifocal pneumonia                   RUR Score:     16             PCP: Yes First and Last name:   Nader Sharma NP   Name of Practice: [facility]   Are you a current patient: Yes/No: Yes   Approximate date of last visit: Unknown    Do you (patient/family) have any concerns for transition/discharge? Not at this time              Plan for utilizing home health:   Not at this time    Current Advanced Directive/Advance Care Plan:  Full code with no advance care plan on file. Will address at a later time. Transition of Care Plan:     Anticipated SURJIT plan is home with outpatient services (Dialysis) and family assistance. SURJIT plan TBD/subject to change pending recommendations. CM will continue to follow and assist with SURJIT needs as they arise. 30year old female, AOx4. Receives assistance with ADL's and IADL's via aide with [employer]. Patient has 32 hours of personal care services a week. No DME utilized. Receives Dialysis MWF from [facility] with a chair time of 6:45 am.  Family/Medicaid transport to and from dialysis. Resides with parents in their 1 story home with no steps to enter. Receives disability as source of income with no significant financial stressors or concerns. Insurance verified: [employer]. [address] is utilized for prescriptions. Family to transport. Mother, Naresh Quintero [phone]/ [phone] and Father, Nalini Archibald [phone]. Care Management Interventions  PCP Verified by CM: Yes  Palliative Care Criteria Met (RRAT>21 & CHF Dx)?: No  Mode of Transport at Discharge:  Other (see comment)(Family to transport)  Transition of Care Consult (CM Consult): (No current CM consults or needs at this time)  Discharge Durable Medical Equipment: No  Physical Therapy Consult: No  Occupational Therapy Consult: No  Speech Therapy Consult: No  Current Support Network: Relative's Home  Confirm Follow Up Transport: Family  Discharge Location  Discharge Placement: Home with family assistance    DERRELL Paz/DANYEL  Care Management  10:40 AM

## 2020-03-15 NOTE — PROGRESS NOTES
9567-received pt into ICU 15  1865-Dr. Hauser aware of pt's arrival-will be up to see pt      0747-bedside shift report given to RN using sba rformat

## 2020-03-15 NOTE — CONSULTS
[facility]    Name:  Tiff Austin  MR#:  [id number]  :  1985  ACCOUNT #:  [de-identified]  DATE OF SERVICE:  03/15/2020      REQUESTING PHYSICIAN:  Reno Iqbal MD    REASON FOR CONSULTATION:  For evaluation and management of ESRD. HISTORY OF PRESENT ILLNESS:  The patient is a 78-year-old -American female with past medical history significant for ESRD on HD, lupus who is on Imuran, presented to ED with chief complaint of fever, shortness of breath, dry cough, and body aches. She had no significant nausea or vomiting. Denied any abdominal pain or diarrhea. She had intermittent headache. Denied any skin rash, recent travel or sick contacts. At present time, she denies any shortness of breath when I saw her. CT of the chest showed multifocal areas of airspace disease with largest measuring in the left lower lobe, which may represent pneumonia. She was started on empiric antibiotics. COVID-19 test also has been sent as it remains a possibility. The patient gets HD on Monday, Wednesday, and Friday at [facility]. She did get her HD on Friday as outpatient. She got full treatment, surprisingly her creatinine was 9.25 yesterday evening, which were labs after dialysis indicative of poor clearance. REVIEW OF SYSTEM:  As noted above. Remainder review of system obtained and negative. Past Medical History:   Diagnosis Date    Anemia     secondary to lupus    Asthma     no inhaler use in past 2 to 3 years    Carditis     Chronic kidney disease     ESRD    Chronic pain     DDD (degenerative disc disease), lumbar     ESRD (end stage renal disease) (HCC)     GERD (gastroesophageal reflux disease)     [facility]    [address]  Tuesday,  Thursday,  and Saturday.      Hypercholesterolemia     Hypertension     Intractable nausea and vomiting 10/21/2015    Long term (current) use of anticoagulants     Lupus (systemic lupus erythematosus) (HCC)     Malignant hypertension with chronic kidney disease [facility] V [facility]     Peritoneal dialysis status ([facility] 10/2015    x 2 years Stopped 2017 due to infection and removed.  Poor historian 2018    With medications    Thromboembolus ([facility] 2013    lungs    Transfusion history     Last Transfusion 2017  at [facility]     Past Surgical History:   Procedure Laterality Date    HX  SECTION  11/2006    x1    HX OTHER SURGICAL  9/16/15    INSERTION PD CATH; Removed 2017    HX VASCULAR ACCESS Right 2017    Double-Lumen henry catheter upper chest    HX VASCULAR ACCESS Right 2018    right upper arm     Allergies   Allergen Reactions    Compazine [Prochlorperazine Edisylate] Nausea and Vomiting and Palpitations     \"hot and lightheaded\"     Medication Documentation Review Audit     Reviewed by Jennifer Galaviz RN (Registered Nurse) on 20 at 1925    Medication Sig Documenting Provider Last Dose Status Taking?   amitriptyline (ELAVIL) 50 mg tablet TAKE 1 TABLET BY MOUTH EVERY NIGHT AT BEDTIME Rogelio Josue NP 3/13/2020 Unknown time Active Yes   amLODIPine (NORVASC) 10 mg tablet Take 1 Tab by mouth daily. Jina Bence, MD 3/14/2020 Unknown time Active Yes   apixaban (ELIQUIS) 5 mg tablet Take 5 mg by mouth every twelve (12) hours. Provider, Historical 3/14/2020 Unknown time Active Yes   azaTHIOprine (IMURAN) 50 mg tablet Take 50 mg by mouth daily (after breakfast). Provider, Historical 3/14/2020 Unknown time Active Yes           Med Note (Melvin Shannon   Wed Dec 13, 2017 11:51 AM)     carvedilol (COREG) 25 mg tablet Take 1 Tab by mouth two (2) times daily (with meals). Kostas Vera MD 3/14/2020 Unknown time Active Yes   ferric citrate (AURYXIA) 210 mg iron tablet Take 420 mg by mouth three (3) times daily (with meals).  Provider, Historical 3/14/2020 Unknown time Active Yes   gemfibrozil (LOPID) 600 mg tablet Take 300 mg by mouth daily. Provider, Historical 3/14/2020 Unknown time Active Yes           Med Note (Rosa Mclean   Mon Mar 12, 2018  8:05 AM)     hydroxychloroquine (PLAQUENIL) 200 mg tablet TAKE 2 TABLETS BY MOUTH DAILY Leandro Eldridge MD 3/14/2020 Unknown time Active Yes   losartan (COZAAR) 100 mg tablet Take 100 mg by mouth daily. Provider, Historical 3/14/2020 Unknown time Active Yes   minoxidil (LONITEN) 2.5 mg tablet Take 5 mg by mouth daily. Provider, Historical 3/14/2020 Unknown time Active Yes   pantoprazole (PROTONIX) 40 mg tablet Take 1 Tab by mouth ACB/HS. Ajay Leal MD 3/14/2020 Unknown time Active Yes   predniSONE (DELTASONE) 5 mg tablet Take 2 Tabs by mouth daily. Vani Upton MD 3/14/2020 Unknown time Active Yes              Social History     Tobacco Use    Smoking status: Never Smoker    Smokeless tobacco: Never Used   Substance Use Topics    Alcohol use: No    Drug use: Yes     Types: Prescription, OTC         PHYSICAL EXAMINATION:  GENERAL:  Young female, well-built, well-nourished, in no acute distress. VITAL SIGNS:  She is afebrile at present. T-max of 102.8, pulse 94, blood pressure 105/80, respirations of 16, and saturating 97% on room air. HEENT:  Head is atraumatic, normocephalic. Mucous membranes are moist.  Sclera is anicteric. LUNGS:  Diminished breath sounds at bases, otherwise clear. HEART:  Regular rate and rhythm. EXTREMITIES:  No clubbing, cyanosis, or edema. Right AV graft is patent with thrill and bruits. CNS:  Alert, oriented x3. LABORATORY DATA:  WBC 13.3, hemoglobin of 8.8, creatinine 9.2, BUN 37, lactic acid of 1. Influenza is negative. Blood cultures are in process. ASSESSMENT:  1.  End-stage renal disease. 2.  Systemic lupus erythematosus. 3.  Hypertension. 4.  Anemia of end-stage renal disease. 5.  Secondary hyperparathyroidism. 6.  History of pulmonary embolism.   7.  Fever, shortness of breath, and cough with CT suggestive of multifocal pneumonia. COVID-19 needs to be excluded. She is in isolation. She is due for HD tomorrow. Blood pressure is low normal and most of her BP medications are currently on hold. RECOMMENDATIONS:  1. We will do HD tomorrow unless labs from today which are to be drawn, show severe hyperkalemia. 2.  We will given gentle IV normal saline at 40 mL per hour. 3.  I agree with holding BP meds and slowly reintroducing them, if her blood pressure raises. I would start with Coreg first.  4.  Continue empiric antibiotics. 5.  Follow culture. Thanks for renal consultation. Discussed with patient, nurse, and Dr. Lexsu Loja.         Analia Ellis MD      [id number]  D:  03/15/2020 12:49  T:  03/15/2020 14:31  JOB #:  [id number]

## 2020-03-15 NOTE — PROGRESS NOTES
TRANSFER - IN REPORT:    Verbal report received from RN(name) on Akua Rodney  being received from short pump ER(unit) for routine progression of care      Report consisted of patients Situation, Background, Assessment and   Recommendations(SBAR). Information from the following report(s) ED Summary, MAR and Cardiac Rhythm NSR was reviewed with the receiving nurse. Opportunity for questions and clarification was provided. Assessment completed upon patients arrival to unit and care assumed.

## 2020-03-15 NOTE — PROGRESS NOTES
Pharmacist Note - Vancomycin Dosing (Hemodialysis Patient)    Consult provided for this 30 y.o. female for indication of pneumonia in immunocompromised patient. This patient is also on the following antibiotic regimen(s): Zosyn, vancomycin    Lab Results   Component Value Date/Time    Creatinine 9.25 (H) 2020 08:08 PM    Creatinine (POC) 4.4 (H) 10/18/2019 11:28 AM    WBC 13.3 (H) 2020 08:08 PM    BUN 37 (H) 2020 08:08 PM    BUN (POC) 16 10/18/2019 11:28 AM   Temp (24hrs), Av.8 °F (37.7 °C), Min:98.2 °F (36.8 °C), Max:102.8 °F (39.3 °C)      Estimated CrCl:  ESRD on HD (Monday/Wednesday/Friday)  Cultures:   blood, pending   respiratory viral panel, negative  2/15 emergent disease panel, pending    For this HD patient, will initiate therapy with a loading dose of Vancomycin 2000 mg, to be followed with a dose of 750 mg after each HD session. Dose will be adjusted to maintain a pre-HD trough of approximately 20 - 25 mcg/mL for therapeutic goal of 15 - 20 mcg/mL as approximately 35% of drug will be removed by HD filtration. Pharmacy to follow patient daily and order levels / make dose adjustments as appropriate.

## 2020-03-15 NOTE — ED NOTES
Verbal shift change report given to [facility], RN  (oncoming nurse) by Ofelia Jewell RN (offgoing nurse). Report included the following information SBAR, ED Summary, Intake/Output, MAR and Recent Results.

## 2020-03-15 NOTE — PROGRESS NOTES
[address] [facility] Adult  Hospitalist Group                                                                                          Hospitalist Progress Note  Marianela Rogers MD  Answering service: [phone] OR [phone] from in house phone        Date of Service:  3/15/2020  NAME:  Marybeth Augustine  :  1985  MRN:  [id number]    This documentation was facilitated by a Voice Recognition software and may contain inadvertent typographical errors. Admission Summary:   Patient is a 76-year-old female with past medical history of lupus, ESRD on hemodialysis came to the emergency room on 3/14/2020 with cough, headache and body aches. She denied fever. She was found febrile and tachycardic in the emergency room. Interval history / Subjective:   Patient is seen in ICU bed 15. She is on airborne isolation pending testing for VOVID-19  -She is resting comfortably. Denied,fever,sob,cough,nausea,vomiting,diarrhea,arthralgia or myalgia. Assessment & Plan:     Sepsis (fever,tachycardia,leukocytosis) due to multifocal pneumonia,viral vs bacterial  -SIRS resolved  -Air borne isolation pending lab for COVID 19  -On zosyn,addedvanco,she is immunocompromised and at risk for MDROs  -Gentle hydration   -Holding antihypertensives  -Discussed with renal  -BP stable,no indication for stress dose prednisone. Metabolic acidosis in the setting of ESRD likley due to dehydration  -Gentle hydration  -Holding BP medications     ESRD on HD:MWF. Renal on board. Dialysis tomorrow     Anemia of esrd/chronic dissease.     SLE without acute flare: continue prednisone.  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 Vital Signs:    Last 24hrs VS reviewed since prior progress note. Most recent are:  Visit Vitals  /82   Pulse 80   Temp 98.4 °F (36.9 °C)   Resp 22   Ht 5' 5\" (1.651 m)   Wt 90.1 kg (198 lb 10.2 oz)   SpO2 95%   BMI 33.05 kg/m²         Intake/Output Summary (Last 24 hours) at 3/15/2020 1740  Last data filed at 3/15/2020 1200  Gross per 24 hour   Intake 100 ml   Output --   Net 100 ml        Physical Examination:             Constitutional:  Not in distress. Lying comfortably   HEENT:  Atraumatic. Oral mucosa moist,. Non icteric sclera. No pallor. Resp:  No sob. Diminished at the bases. No wheezing,rales. Chest Wall: No deformity   CV:  Regular rhythm, normal rate, no murmurs, gallops, rubs    GI:  Soft, non distended, non tender. normoactive bowel sounds, no hepatosplenomegaly    :  No CVA or suprapubic tenderness    Musculoskeletal:  No edema, warm, 2+ pulses throughout    Neurologic:  Mental status:AAOx3,   Cranial nerves II-XII : WNL  Motor exam:Moves all extremities symmetrically       Psych:  Good insight, Not anxious nor agitated. Data Review:    Review and/or order of clinical lab test  Review and/or order of tests in the radiology section of CPT  Review and/or order of tests in the medicine section of CPT      Labs:     Recent Labs     03/14/20 2008   WBC 13.3*   HGB 8.8*   HCT 28.1*        Recent Labs     03/14/20 2008   *   K 4.1   CL 90*   CO2 26   BUN 37*   CREA 9.25*   *   CA 9.7     Recent Labs     03/14/20 2008   SGOT 17   ALT 11*   AP 63   TBILI 0.6   TP 9.0*   ALB 3.4*   GLOB 5.6*     No results for input(s): INR, PTP, APTT, INREXT in the last 72 hours. No results for input(s): FE, TIBC, PSAT, FERR in the last 72 hours.    Lab Results   Component Value Date/Time    Folate 13.7 06/17/2018 03:00 PM      No results for input(s): PH, PCO2, PO2 in the last 72 hours. No results for input(s): CPK, CKNDX, TROIQ in the last 72 hours.     No lab exists for component: CPKMB  Lab Results   Component Value Date/Time    Cholesterol, total 140 09/11/2018 06:17 AM    HDL Cholesterol 43 09/11/2018 06:17 AM    LDL, calculated 60.4 09/11/2018 06:17 AM    Triglyceride 183 (H) 09/11/2018 06:17 AM    CHOL/HDL Ratio 3.3 09/11/2018 06:17 AM     Lab Results   Component Value Date/Time    Glucose (POC) 85 10/18/2019 11:28 AM    Glucose (POC) 85 10/18/2019 11:04 AM    Glucose (POC) 90 04/19/2019 06:30 AM    Glucose (POC) 84 03/05/2019 08:11 AM    Glucose (POC) 104 (H) 08/16/2018 11:06 AM    Glucose (POC) 164 (H) 07/22/2018 11:43 AM     Lab Results   Component Value Date/Time    Color YELLOW/STRAW 03/31/2019 06:07 PM    Appearance CLEAR 03/31/2019 06:07 PM    Specific gravity 1.015 03/31/2019 06:07 PM    Specific gravity 1.017 08/12/2016 09:06 PM    pH (UA) 8.5 (H) 03/31/2019 06:07 PM    Protein 100 (A) 03/31/2019 06:07 PM    Glucose NEGATIVE  03/31/2019 06:07 PM    Ketone NEGATIVE  03/31/2019 06:07 PM    Bilirubin NEGATIVE  03/31/2019 06:07 PM    Urobilinogen 0.2 03/31/2019 06:07 PM    Nitrites NEGATIVE  03/31/2019 06:07 PM    Leukocyte Esterase NEGATIVE  03/31/2019 06:07 PM    Epithelial cells MODERATE (A) 03/31/2019 06:07 PM    Bacteria NEGATIVE  03/31/2019 06:07 PM    WBC 0-4 03/31/2019 06:07 PM    RBC 0-5 03/31/2019 06:07 PM         Medications Reviewed:     Current Facility-Administered Medications   Medication Dose Route Frequency    piperacillin-tazobactam (ZOSYN) 3.375 g in 0.9% sodium chloride (MBP/ADV) 100 mL  3.375 g IntraVENous Q12H    amitriptyline (ELAVIL) tablet 25 mg  25 mg Oral QHS    [Held by provider] amLODIPine (NORVASC) tablet 5 mg  5 mg Oral DAILY    apixaban (ELIQUIS) tablet 5 mg  5 mg Oral Q12H    [Held by provider] carvediloL (COREG) tablet 25 mg  25 mg Oral BID WITH MEALS    ferric citrate (AURYXIA) tablet 420 mg  420 mg Oral TID WITH MEALS    gemfibroziL (LOPID) tablet 300 mg  300 mg Oral DAILY    [Held by provider] losartan (COZAAR) tablet 100 mg  100 mg Oral DAILY    [Held by provider] minoxidiL (LONITEN) tablet 5 mg  5 mg Oral DAILY    pantoprazole (PROTONIX) tablet 40 mg  40 mg Oral ACB/HS    predniSONE (DELTASONE) tablet 10 mg  10 mg Oral DAILY    Vancomycin- Pharmacy to Dose   Other Rx Dosing/Monitoring    0.9% sodium chloride infusion  40 mL/hr IntraVENous CONTINUOUS    sodium chloride (NS) flush 5-40 mL  5-40 mL IntraVENous Q8H    sodium chloride (NS) flush 5-40 mL  5-40 mL IntraVENous PRN    acetaminophen (TYLENOL) tablet 650 mg  650 mg Oral Q4H PRN     ______________________________________________________________________  EXPECTED LENGTH OF STAY: - - -  ACTUAL LENGTH OF STAY:          1                 Jannet Davis MD

## 2020-03-16 VITALS
BODY MASS INDEX: 33.65 KG/M2 | SYSTOLIC BLOOD PRESSURE: 119 MMHG | HEIGHT: 65 IN | HEART RATE: 84 BPM | TEMPERATURE: 98.2 F | DIASTOLIC BLOOD PRESSURE: 85 MMHG | RESPIRATION RATE: 20 BRPM | OXYGEN SATURATION: 97 % | WEIGHT: 201.94 LBS

## 2020-03-16 LAB
ANION GAP SERPL CALC-SCNC: 10 MMOL/L (ref 5–15)
ANION GAP SERPL CALC-SCNC: 16 MMOL/L (ref 5–15)
BASOPHILS # BLD: 0 K/UL (ref 0–0.1)
BASOPHILS NFR BLD: 0 % (ref 0–1)
BUN SERPL-MCNC: 24 MG/DL (ref 6–20)
BUN SERPL-MCNC: 57 MG/DL (ref 6–20)
BUN/CREAT SERPL: 4 (ref 12–20)
BUN/CREAT SERPL: 5 (ref 12–20)
CALCIUM SERPL-MCNC: 8.1 MG/DL (ref 8.5–10.1)
CALCIUM SERPL-MCNC: 9.1 MG/DL (ref 8.5–10.1)
CHLORIDE SERPL-SCNC: 98 MMOL/L (ref 97–108)
CHLORIDE SERPL-SCNC: 99 MMOL/L (ref 97–108)
CO2 SERPL-SCNC: 19 MMOL/L (ref 21–32)
CO2 SERPL-SCNC: 28 MMOL/L (ref 21–32)
CREAT SERPL-MCNC: 10.6 MG/DL (ref 0.55–1.02)
CREAT SERPL-MCNC: 6.2 MG/DL (ref 0.55–1.02)
DIFFERENTIAL METHOD BLD: ABNORMAL
EOSINOPHIL # BLD: 0 K/UL (ref 0–0.4)
EOSINOPHIL NFR BLD: 1 % (ref 0–7)
ERYTHROCYTE [DISTWIDTH] IN BLOOD BY AUTOMATED COUNT: 14.3 % (ref 11.5–14.5)
GLUCOSE SERPL-MCNC: 134 MG/DL (ref 65–100)
GLUCOSE SERPL-MCNC: 98 MG/DL (ref 65–100)
HBV SURFACE AB SER QL: REACTIVE
HBV SURFACE AB SER-ACNC: >1000 MIU/ML
HBV SURFACE AG SER QL: <0.1 INDEX
HBV SURFACE AG SER QL: NEGATIVE
HCT VFR BLD AUTO: 25.1 % (ref 35–47)
HGB BLD-MCNC: 8.2 G/DL (ref 11.5–16)
IMM GRANULOCYTES # BLD AUTO: 0.1 K/UL (ref 0–0.04)
IMM GRANULOCYTES NFR BLD AUTO: 1 % (ref 0–0.5)
LYMPHOCYTES # BLD: 0.8 K/UL (ref 0.8–3.5)
LYMPHOCYTES NFR BLD: 10 % (ref 12–49)
MCH RBC QN AUTO: 30.6 PG (ref 26–34)
MCHC RBC AUTO-ENTMCNC: 32.7 G/DL (ref 30–36.5)
MCV RBC AUTO: 93.7 FL (ref 80–99)
MONOCYTES # BLD: 0.4 K/UL (ref 0–1)
MONOCYTES NFR BLD: 5 % (ref 5–13)
NEUTS SEG # BLD: 6.8 K/UL (ref 1.8–8)
NEUTS SEG NFR BLD: 83 % (ref 32–75)
NRBC # BLD: 0 K/UL (ref 0–0.01)
NRBC BLD-RTO: 0 PER 100 WBC
PLATELET # BLD AUTO: 214 K/UL (ref 150–400)
PMV BLD AUTO: 9.6 FL (ref 8.9–12.9)
POTASSIUM SERPL-SCNC: 3.6 MMOL/L (ref 3.5–5.1)
POTASSIUM SERPL-SCNC: 4.4 MMOL/L (ref 3.5–5.1)
RBC # BLD AUTO: 2.68 M/UL (ref 3.8–5.2)
SODIUM SERPL-SCNC: 134 MMOL/L (ref 136–145)
SODIUM SERPL-SCNC: 136 MMOL/L (ref 136–145)
WBC # BLD AUTO: 8.1 K/UL (ref 3.6–11)

## 2020-03-16 PROCEDURE — 85025 COMPLETE CBC W/AUTO DIFF WBC: CPT

## 2020-03-16 PROCEDURE — 74011636637 HC RX REV CODE- 636/637: Performed by: INTERNAL MEDICINE

## 2020-03-16 PROCEDURE — 80048 BASIC METABOLIC PNL TOTAL CA: CPT

## 2020-03-16 PROCEDURE — 74011250636 HC RX REV CODE- 250/636: Performed by: INTERNAL MEDICINE

## 2020-03-16 PROCEDURE — 74011250637 HC RX REV CODE- 250/637: Performed by: INTERNAL MEDICINE

## 2020-03-16 PROCEDURE — 36415 COLL VENOUS BLD VENIPUNCTURE: CPT

## 2020-03-16 PROCEDURE — 74011250636 HC RX REV CODE- 250/636: Performed by: HOSPITALIST

## 2020-03-16 PROCEDURE — 90935 HEMODIALYSIS ONE EVALUATION: CPT

## 2020-03-16 PROCEDURE — 5A1D70Z PERFORMANCE OF URINARY FILTRATION, INTERMITTENT, LESS THAN 6 HOURS PER DAY: ICD-10-PCS | Performed by: HOSPITALIST

## 2020-03-16 PROCEDURE — 87340 HEPATITIS B SURFACE AG IA: CPT

## 2020-03-16 PROCEDURE — 74011000258 HC RX REV CODE- 258: Performed by: INTERNAL MEDICINE

## 2020-03-16 PROCEDURE — 86706 HEP B SURFACE ANTIBODY: CPT

## 2020-03-16 RX ORDER — AMOXICILLIN AND CLAVULANATE POTASSIUM 500; 125 MG/1; MG/1
1 TABLET, FILM COATED ORAL DAILY
Qty: 7 TAB | Refills: 0 | Status: SHIPPED | OUTPATIENT
Start: 2020-03-16 | End: 2020-03-23

## 2020-03-16 RX ADMIN — FERRIC CITRATE 420 MG: 210 TABLET, COATED ORAL at 08:07

## 2020-03-16 RX ADMIN — SODIUM CHLORIDE 40 ML/HR: 900 INJECTION, SOLUTION INTRAVENOUS at 08:16

## 2020-03-16 RX ADMIN — FERRIC CITRATE 420 MG: 210 TABLET, COATED ORAL at 13:26

## 2020-03-16 RX ADMIN — PIPERACILLIN AND TAZOBACTAM 3.38 G: 3; .375 INJECTION, POWDER, LYOPHILIZED, FOR SOLUTION INTRAVENOUS at 13:02

## 2020-03-16 RX ADMIN — PANTOPRAZOLE SODIUM 40 MG: 40 TABLET, DELAYED RELEASE ORAL at 08:07

## 2020-03-16 RX ADMIN — FERRIC CITRATE 420 MG: 210 TABLET, COATED ORAL at 17:15

## 2020-03-16 RX ADMIN — ACETAMINOPHEN 650 MG: 325 TABLET ORAL at 08:07

## 2020-03-16 RX ADMIN — GEMFIBROZIL 300 MG: 600 TABLET ORAL at 08:08

## 2020-03-16 RX ADMIN — APIXABAN 5 MG: 5 TABLET, FILM COATED ORAL at 08:07

## 2020-03-16 RX ADMIN — SODIUM CHLORIDE 750 MG: 900 INJECTION, SOLUTION INTRAVENOUS at 15:40

## 2020-03-16 RX ADMIN — Medication 40 ML: at 13:05

## 2020-03-16 RX ADMIN — PREDNISONE 10 MG: 10 TABLET ORAL at 08:09

## 2020-03-16 NOTE — PROGRESS NOTES
Patient name: Jerome Chahal  MRN: [id number]    Nephrology Progress note:    Assessment:  ESRD ([facility] [address] unit; MWF)  SLE  HTN- stable  SHPT  Anemia of ESRD  Fever/SOB/Cough- CT chest with multifocal PNA. Covid- 19 testing negative    Plan/Recommendations:  Ct IV Abx  Imuran on hold  Retacrit  Am labs      Subjective:  Seen on HD at 11am. BP stable. Afebrile. Reports significant improvement in dyspnea    ROS:   No nausea, no vomiting  No chest pain    Exam:  Visit Vitals  /84   Pulse 86   Temp 97.7 °F (36.5 °C) (Oral)   Resp 26   Ht 5' 5\" (1.651 m)   Wt 91.6 kg (201 lb 15.1 oz)   SpO2 98%   BMI 33.60 kg/m²     Wt Readings from Last 3 Encounters:   03/16/20 91.6 kg (201 lb 15.1 oz)   12/05/19 83.5 kg (184 lb)   11/06/19 83.5 kg (184 lb)       Intake/Output Summary (Last 24 hours) at 3/16/2020 1138  Last data filed at 3/16/2020 0800  Gross per 24 hour   Intake 1142 ml   Output --   Net 1142 ml       Gen: NAD  HEENT: AT/NC  Lungs/Chest wall: Clear. No accessory muscle use. Cardiovascular: Regular rate, normal rhythm. Abdomen/: Soft, NT, ND, BS+. Ext: No peripheral edema  CNS: alert and awake.  Answers appropriately      Current Facility-Administered Medications   Medication Dose Route Frequency Last Dose    piperacillin-tazobactam (ZOSYN) 3.375 g in 0.9% sodium chloride (MBP/ADV) 100 mL  3.375 g IntraVENous Q12H Stopped at 03/16/20 0009    amitriptyline (ELAVIL) tablet 25 mg  25 mg Oral QHS 25 mg at 03/15/20 2008    [Held by provider] amLODIPine (NORVASC) tablet 5 mg  5 mg Oral DAILY      apixaban (ELIQUIS) tablet 5 mg  5 mg Oral Q12H 5 mg at 03/16/20 0807    [Held by provider] carvediloL (COREG) tablet 25 mg  25 mg Oral BID WITH MEALS Stopped at 03/15/20 0800    ferric citrate (AURYXIA) tablet 420 mg  420 mg Oral TID WITH MEALS 420 mg at 03/16/20 0807    gemfibroziL (LOPID) tablet 300 mg  300 mg Oral DAILY 300 mg at 03/16/20 0808    [Held by provider] losartan (COZAAR) tablet 100 mg  100 mg Oral DAILY      [Held by provider] minoxidiL (LONITEN) tablet 5 mg  5 mg Oral DAILY      pantoprazole (PROTONIX) tablet 40 mg  40 mg Oral ACB/HS 40 mg at 03/16/20 0807    predniSONE (DELTASONE) tablet 10 mg  10 mg Oral DAILY 10 mg at 03/16/20 0809    Vancomycin- Pharmacy to Dose   Other Rx Dosing/Monitoring      0.9% sodium chloride infusion  40 mL/hr IntraVENous CONTINUOUS 40 mL/hr at 03/16/20 0816    sodium chloride (NS) flush 5-40 mL  5-40 mL IntraVENous Q8H Stopped at 03/15/20 2200    sodium chloride (NS) flush 5-40 mL  5-40 mL IntraVENous PRN      acetaminophen (TYLENOL) tablet 650 mg  650 mg Oral Q4H  mg at 03/16/20 3142       Labs/Data:    Lab Results   Component Value Date/Time    WBC 8.1 03/16/2020 09:46 AM    Hemoglobin (POC) 7.8 (L) 01/19/2018 12:30 PM    HGB 8.2 (L) 03/16/2020 09:46 AM    Hematocrit (POC) 42 10/18/2019 11:28 AM    HCT 25.1 (L) 03/16/2020 09:46 AM    PLATELET 761 97/03/8012 09:46 AM    MCV 93.7 03/16/2020 09:46 AM       Lab Results   Component Value Date/Time    Sodium 134 (L) 03/16/2020 04:09 AM    Potassium 3.6 03/16/2020 04:09 AM    Chloride 99 03/16/2020 04:09 AM    CO2 19 (L) 03/16/2020 04:09 AM    Anion gap 16 (H) 03/16/2020 04:09 AM    Glucose 98 03/16/2020 04:09 AM    BUN 57 (H) 03/16/2020 04:09 AM    Creatinine 10.60 (H) 03/16/2020 04:09 AM    BUN/Creatinine ratio 5 (L) 03/16/2020 04:09 AM    GFR est AA 5 (L) 03/16/2020 04:09 AM    GFR est non-AA 4 (L) 03/16/2020 04:09 AM    Calcium 8.1 (L) 03/16/2020 04:09 AM       Patient seen and examined. Chart reviewed. Labs, data and other pertinent notes reviewed in last 24 hrs.     Discussed with patient and HD RN    Signed by:  Myron Mortimer, MD  [facility]

## 2020-03-16 NOTE — PROCEDURES
[facility] Dialysis Team [facility]  [phone]    Vitals   Pre   Post   Assessment   Pre   Post     Temp  97.7  98 LOC  AOx4 AOx4   HR   78 98 Lungs   Coarse/diminished in bilat. bases  diminished in bilat.  bases   B/P  115/85 125/87 Cardiac   Bedside monitoring  bedside monitoring   Resp   22 28 Skin   Warm/dry  warm/dry   Pain level  0 0 Edema  generalized     none   Orders:    Duration:   Start:    0958 End:    1250 Total:   3.5 hours   Dialyzer:   Dialyzer/Set Up Inspection: Reginoar(filter = [id number] Tubing = [id number]) (03/16/20 0915)   K Bath:   Dialysate K (mEq/L): 3 (03/16/20 0915)   Ca Bath:   Dialysate CA (mEq/L): 2.5 (03/16/20 0915)   Na/Bicarb:   Dialysate NA (mEq/L): 140 (03/16/20 0915)   Target Fluid Removal:   Goal/Amount of Fluid to Remove (mL): 2000 mL (03/16/20 0915)   Access     Type & Location:   RUE AVG - site w/ no s/sx of infection, site cleansed with alcohol wipes, cannulated with 15G 1\" needles x 2, +flashes/aspiration/NS flushes       Labs     Obtained/Reviewed   Critical Results Called   Date when labs were drawn-  Hgb-    HGB   Date Value Ref Range Status   03/16/2020 8.2 (L) 11.5 - 16.0 g/dL Final     K-    Potassium   Date Value Ref Range Status   03/16/2020 3.6 3.5 - 5.1 mmol/L Final     Ca-   Calcium   Date Value Ref Range Status   03/16/2020 8.1 (L) 8.5 - 10.1 MG/DL Final     Bun-   BUN   Date Value Ref Range Status   03/16/2020 57 (H) 6 - 20 MG/DL Final     Comment:     INVESTIGATED PER DELTA CHECK PROTOCOL     Creat-   Creatinine   Date Value Ref Range Status   03/16/2020 10.60 (H) 0.55 - 1.02 MG/DL Final        Medications/ Blood Products Given     Name   Dose   Route and Time                     Blood Volume Processed (BVP):    76L Net Fluid   Removed:  2000mL   Comments   Time Out Done: 6636  Primary Nurse Rpt Pre: Leydi Salinas RN  Primary Nurse Rpt Post: Simuel Olp, RN  Pt Education: Site and access care - pt possesses high understanding d/t being chronic dialysis pt  Care Plan: Continue HD treatments per MD  Tx Summary: Pt formally ruled out COVID-19 3/16/20 and airbone precaution stopped. Pt tolerated tx well, at end of tx, remaining blood in circuit returned with 300cc NS, cannulas removed x 2, hemostasis obtained, dressing applied. Admiting Diagnosis: Pneumonia  Pt's previous clinic-   Consent signed - Informed Consent Verified: Yes (03/16/20 0915)  [facility] Consent - Consents obtained  Hepatitis Status- Hep B Ag - negative (03/11/2020)  Hep B Ab - immune (03/11/2020)  Machine #- Machine Number: [id number] (03/16/20 0915)  Telemetry status- Bedside monitoring/ICU  Pre-dialysis wt. -

## 2020-03-16 NOTE — DISCHARGE INSTRUCTIONS
Discharge Instructions       PATIENT ID: Kris Hui  MRN: [id number]   YOB: 1985    DATE OF ADMISSION: 3/14/2020  7:18 PM    DATE OF DISCHARGE: 3/16/2020    PRIMARY CARE PROVIDER: Omar Multani NP     ATTENDING PHYSICIAN: Elodia Sanchez MD  DISCHARGING PROVIDER: Ange Sin MD    To contact this individual call [phone] and ask the  to page. If unavailable ask to be transferred the Adult Hospitalist Department. DISCHARGE DIAGNOSES and CARE RECOMMENDATIONS:   Pneumonia  -There was initially a suspicion for the COVID-19. Your test has come back negative.  -Complete the prescribed antibiotic. You need follow-up x-ray or CAT scan of the chest in 6 to 8 weeks to ensure resolution of the pneumonia. -If you notice recurrence of fever, shortness of breath, talk to your primary doctor or if the symptoms are severe go to the emergency room or call 911    Hypertension  -Your blood pressure was on the low normal side without any blood pressure medicine in the hospital.  You are on 4 different blood pressure medications and if you restart all of those were concerned that you may become hypotensive. After discussing with Dr. Burnetta Goltz, the kidney doctor, we agreed for you to start back on the carvedilol. They will assess for the need for restarting the other 3 medications when you go for your dialysis. -Measure your blood pressure and keep a log. If your blood pressure is persistently above 140/90, call your kidney doctor for recommendations.         DIET: Regular Diet and Cardiac Diet      ACTIVITY: Activity as tolerated      SERVICES and EQUIPMENT needed: None applicable    PENDING TEST RESULTS:   At the time of discharge the following test results are still pending: None    FOLLOW UP APPOINTMENTS:   Follow-up Information     Follow up With Specialties Details Why Contact Princess Huerta NP Nurse Practitioner   [address] [facility] [address]  [phone]               YOU WERE SEEN BY THE FOLLOWING CONSULTATIONS:   IP CONSULT TO HOSPITALIST  IP CONSULT TO NEPHROLOGY    YOU HAD THE FOLLOWING PROCEDURES/SURGERIES  :   * No surgery found *              DISCHARGE MEDICATIONS:   See Medication Reconciliation Form    · It is important that you take the medication exactly as they are prescribed. · Keep your medication in the bottles provided by the pharmacist and keep a list of the medication names, dosages, and times to be taken in your wallet. · Do not take other medications without consulting your doctor. NOTIFY YOUR PHYSICIAN FOR ANY OF THE FOLLOWING:   Fever over 101 degrees for 24 hours. Chest pain, shortness of breath, fever, chills, nausea, vomiting, diarrhea, change in mentation, falling, weakness, bleeding. Severe pain or pain not relieved by medications. Or, any other signs or symptoms that you may have questions about. Signed:    Concepción Arteaga MD  3/16/2020  6:37 PM

## 2020-03-16 NOTE — PROGRESS NOTES
1930-bedside shift report received from [address] [place] [address] using sbar format  0500-hgb dropped to 6.1 will redraw on HD/pt difficult stick with HD fistula RUE  0700-per infection control [facility] pt can be removed from airborne isolation/pt is covid 19 negative.  Pt updated and isolation stopped  0730-bedside shift report given to RN using sba rformat

## 2020-03-16 NOTE — PROGRESS NOTES
Transitions of care  Home with family and previous services. Per previous CM note patient lives at home with her parents, mother is  Filomena Rosas [phone], [phone]. Patient has aides through [email or web address] for 32 hours a week. She receives dialysis at [facility] on a M-W-F schedule and is transported by either parents or [facility]. Care management is continuing to follow.  Kelvin Lam RN,CRM

## 2020-03-16 NOTE — PROGRESS NOTES
[facility]                                                                                          Hospitalist Progress Note  Cesar Martines MD  Answering service: [phone] OR [phone] from in house phone        Date of Service:  3/16/2020  NAME:  Barbara Alston  :  1985  MRN:  [id number]    This documentation was facilitated by a Voice Recognition software and may contain inadvertent typographical errors. Admission Summary:   Patient is a 72-year-old female with past medical history of lupus, ESRD on hemodialysis came to the emergency room on 3/14/2020 with cough, headache and body aches. She denied fever. She was found febrile and tachycardic in the emergency room. Interval history / Subjective:   Patient is seen in ICU bed 15.    -She finished dialysis. Denies fever,sob or chest pain. On room air. -SARS COV 2 negative and she is off airborne isolation. Assessment & Plan:     Sepsis (fever,tachycardia,leukocytosis) due to multifocal pneumonia,viral vs bacterial  -SIRS resolved  -COVID 19 test negative and airborne isolation discontinued.  -On zosyn and vancomycin. Blood cx remained negative      Metabolic acidosis in the setting of ESRD likley due to dehydration  -check BMP now she has received dialysis    ESRD on HD:MWF. Dialyzed today     Anemia of esrd/chronic dissease. SLE without acute flare: continue prednisone.  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 Discussed with nephrology and we agreed to resume only coreg for now. She will be assessed for need to restart the other three antihypertensive medications when she goes for dialysis.     Code status: full  DVT prophylaxis: apixaban    Care Plan discussed with: Patient/Family  Anticipated Disposition: Home w/Family  Anticipated Discharge:d/c pending BMP Hospital Problems  Date Reviewed: 7/31/2019          Codes Class Noted POA    Pneumonia ICD-10-CM: J18.9  ICD-9-CM: 820  3/14/2020 Unknown                Review of Systems:   A comprehensive review of systems was negative except for that written in the HPI. Vital Signs:    Last 24hrs VS reviewed since prior progress note. Most recent are:  Visit Vitals  /88 (BP 1 Location: Left arm, BP Patient Position: At rest)   Pulse 83   Temp 98 °F (36.7 °C)   Resp 15   Ht 5' 5\" (1.651 m)   Wt 91.6 kg (201 lb 15.1 oz)   SpO2 96%   BMI 33.60 kg/m²         Intake/Output Summary (Last 24 hours) at 3/16/2020 1731  Last data filed at 3/16/2020 1600  Gross per 24 hour   Intake 1652 ml   Output 2000 ml   Net -348 ml        Physical Examination:             Constitutional:  Not in distress. Lying comfortably. On Room air    HEENT:  Atraumatic. Oral mucosa moist,. Non icteric sclera. No pallor. Resp:  No sob. Diminished at the bases. No wheezing,rales. Chest Wall: No deformity   CV:  Regular rhythm, normal rate, no murmurs, gallops, rubs    GI:  Soft, non distended, non tender. normoactive bowel sounds, no hepatosplenomegaly    :  No CVA or suprapubic tenderness    Musculoskeletal:  No edema, warm, 2+ pulses throughout    Neurologic:  Mental status:AAOx3,   Cranial nerves II-XII : WNL  Motor exam:Moves all extremities symmetrically       Psych:  Good insight, Not anxious nor agitated.        Data Review:    Review and/or order of clinical lab test  Review and/or order of tests in the radiology section of CPT  Review and/or order of tests in the medicine section of CPT      Labs:     Recent Labs     03/16/20  0946 03/14/20 2008   WBC 8.1 13.3*   HGB 8.2* 8.8*   HCT 25.1* 28.1*    243     Recent Labs     03/16/20  0409 03/14/20 2008   * 135*   K 3.6 4.1   CL 99 90*   CO2 19* 26   BUN 57* 37*   CREA 10.60* 9.25*   GLU 98 104*   CA 8.1* 9.7     Recent Labs     03/14/20 2008   SGOT 17   ALT 11*   AP 63   TBILI 0.6   TP 9.0*   ALB 3.4*   GLOB 5.6*     No results for input(s): INR, PTP, APTT, INREXT, INREXT in the last 72 hours. No results for input(s): FE, TIBC, PSAT, FERR in the last 72 hours. Lab Results   Component Value Date/Time    Folate 13.7 06/17/2018 03:00 PM      No results for input(s): PH, PCO2, PO2 in the last 72 hours. No results for input(s): CPK, CKNDX, TROIQ in the last 72 hours.     No lab exists for component: CPKMB  Lab Results   Component Value Date/Time    Cholesterol, total 140 09/11/2018 06:17 AM    HDL Cholesterol 43 09/11/2018 06:17 AM    LDL, calculated 60.4 09/11/2018 06:17 AM    Triglyceride 183 (H) 09/11/2018 06:17 AM    CHOL/HDL Ratio 3.3 09/11/2018 06:17 AM     Lab Results   Component Value Date/Time    Glucose (POC) 85 10/18/2019 11:28 AM    Glucose (POC) 85 10/18/2019 11:04 AM    Glucose (POC) 90 04/19/2019 06:30 AM    Glucose (POC) 84 03/05/2019 08:11 AM    Glucose (POC) 104 (H) 08/16/2018 11:06 AM    Glucose (POC) 164 (H) 07/22/2018 11:43 AM     Lab Results   Component Value Date/Time    Color YELLOW/STRAW 03/31/2019 06:07 PM    Appearance CLEAR 03/31/2019 06:07 PM    Specific gravity 1.015 03/31/2019 06:07 PM    Specific gravity 1.017 08/12/2016 09:06 PM    pH (UA) 8.5 (H) 03/31/2019 06:07 PM    Protein 100 (A) 03/31/2019 06:07 PM    Glucose NEGATIVE  03/31/2019 06:07 PM    Ketone NEGATIVE  03/31/2019 06:07 PM    Bilirubin NEGATIVE  03/31/2019 06:07 PM    Urobilinogen 0.2 03/31/2019 06:07 PM    Nitrites NEGATIVE  03/31/2019 06:07 PM    Leukocyte Esterase NEGATIVE  03/31/2019 06:07 PM    Epithelial cells MODERATE (A) 03/31/2019 06:07 PM    Bacteria NEGATIVE  03/31/2019 06:07 PM    WBC 0-4 03/31/2019 06:07 PM    RBC 0-5 03/31/2019 06:07 PM         Medications Reviewed:     Current Facility-Administered Medications   Medication Dose Route Frequency    epoetin pia-epbx (RETACRIT) injection 10,000 Units  10,000 Units SubCUTAneous DIALYSIS MON, WED & FRI    vancomycin (VANCOCIN) 750 mg in 0.9% sodium chloride (MBP/ADV) 250 mL  750 mg IntraVENous ONCE    piperacillin-tazobactam (ZOSYN) 3.375 g in 0.9% sodium chloride (MBP/ADV) 100 mL  3.375 g IntraVENous Q12H    amitriptyline (ELAVIL) tablet 25 mg  25 mg Oral QHS    [Held by provider] amLODIPine (NORVASC) tablet 5 mg  5 mg Oral DAILY    apixaban (ELIQUIS) tablet 5 mg  5 mg Oral Q12H    [Held by provider] carvediloL (COREG) tablet 25 mg  25 mg Oral BID WITH MEALS    ferric citrate (AURYXIA) tablet 420 mg  420 mg Oral TID WITH MEALS    gemfibroziL (LOPID) tablet 300 mg  300 mg Oral DAILY    [Held by provider] losartan (COZAAR) tablet 100 mg  100 mg Oral DAILY    [Held by provider] minoxidiL (LONITEN) tablet 5 mg  5 mg Oral DAILY    pantoprazole (PROTONIX) tablet 40 mg  40 mg Oral ACB/HS    predniSONE (DELTASONE) tablet 10 mg  10 mg Oral DAILY    Vancomycin- Pharmacy to Dose   Other Rx Dosing/Monitoring    0.9% sodium chloride infusion  40 mL/hr IntraVENous CONTINUOUS    sodium chloride (NS) flush 5-40 mL  5-40 mL IntraVENous Q8H    sodium chloride (NS) flush 5-40 mL  5-40 mL IntraVENous PRN    acetaminophen (TYLENOL) tablet 650 mg  650 mg Oral Q4H PRN     ______________________________________________________________________  EXPECTED LENGTH OF STAY: 4d 19h  ACTUAL LENGTH OF STAY:          2                 Delmy Kaminski MD

## 2020-03-16 NOTE — PROGRESS NOTES
Received notice of COVID-19 test result from [facility] via email. Zenaida Given,   Negative! SARS CoV NOT detected    Thanks,  Stephani Mandujano, Mar 15, 2020 at 12:40 AM Josie Hernández <[email or web address] wrote:  FREIDA [id number]   JOHN PORTILLO 1985    11:30 p.m. paged hospitalist on call.

## 2020-03-17 ENCOUNTER — PATIENT OUTREACH (OUTPATIENT)
Dept: CARDIOLOGY CLINIC | Age: 35
End: 2020-03-17

## 2020-03-17 NOTE — DISCHARGE SUMMARY
Discharge Summary       PATIENT ID: Faviola Sierra  MRN: [id number]   YOB: 1985    DATE OF ADMISSION: 3/14/2020  7:18 PM    DATE OF DISCHARGE: 03/16/20   PRIMARY CARE PROVIDER: Gerhardt Moellers, NP     ATTENDING PHYSICIAN: Norm Noel MD    DISCHARGING PROVIDER: Norm Noel MD    To contact this individual call [phone] and ask the  to page. If unavailable ask to be transferred the Adult Hospitalist Department. CONSULTATIONS: IP CONSULT TO HOSPITALIST  IP CONSULT TO NEPHROLOGY    PROCEDURES/SURGERIES: * No surgery found *    ADMITTING [address] [place] [address] COURSE:     Patient is a 79-year-old female with past medical history of lupus, ESRD on hemodialysis came to the emergency room on 3/14/2020 with cough, headache and body aches. She denied fever. She was found febrile and tachycardic in the emergency room. DISCHARGE DIAGNOSES / PLAN:    Sepsis (fever,tachycardia,leukocytosis) due to multifocal pneumonia,viral vs bacterial  -SIRS Sirs quickly resolved. Blood culture remained negative for 2 days. There was suspicion for COVID 19 and test came back negative and airborne isolation discontinued. Patient never been hypoxic and remained on room air. Since she is significantly improved on zosyn and vancomycin. Blood cx remained negatives no suspicion for staph infection. She was discharged on Augmentin dose adjusted to the kidney function, i.e., ESRD on hemodialysis        Metabolic acidosis in the setting of ESRD likley due to dehydration: Resolved with gentle IV fluid and hemodialysis     ESRD on HD: She received dialysis prior to discharge today. Anemia of esrd/chronic dissease. SLE without acute flare: Resume home medications. .       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 Discussed with nephrology and we agreed to resume only coreg for now.She will be assessed for need to restart the other three antihypertensive medications when she goes for dialysis. PENDING TEST RESULTS:   At the time of discharge the following test results are still pending: None    FOLLOW UP APPOINTMENTS:    Follow-up Information     Follow up With Specialties Details Why Contact Info    Miky Meyers NP Nurse Practitioner   [address]  [phone]               DIET: Regular Diet, Cardiac Diet and Renal Diet    ACTIVITY: Activity as tolerated          DISCHARGE MEDICATIONS:  Discharge Medication List as of 3/16/2020  6:58 PM      START taking these medications    Details   amoxicillin-clavulanate (Augmentin) 500-125 mg per tablet Take 1 Tab by mouth daily for 7 days. , Normal, Disp-7 Tab, R-0      L.acidoph & parac-S.therm-Bifido (AJIT Q2/RISAQUAD-2) 16 billion cell cap cap Take 1 Cap by mouth daily for 30 days. , Normal, Disp-30 Cap, R-0         CONTINUE these medications which have NOT CHANGED    Details   amitriptyline (ELAVIL) 50 mg tablet TAKE 1 TABLET BY MOUTH EVERY NIGHT AT BEDTIME, Normal, Disp-30 Tab, R-5      apixaban (ELIQUIS) 5 mg tablet Take 5 mg by mouth every twelve (12) hours. , Historical Med      pantoprazole (PROTONIX) 40 mg tablet Take 1 Tab by mouth ACB/HS. , Print, Disp-60 Tab, R-0      ferric citrate (AURYXIA) 210 mg iron tablet Take 420 mg by mouth three (3) times daily (with meals). , Historical Med      carvedilol (COREG) 25 mg tablet Take 1 Tab by mouth two (2) times daily (with meals). , Print, Disp-60 Tab, R-0      predniSONE (DELTASONE) 5 mg tablet Take 2 Tabs by mouth daily. , No Print, Disp-30 Tab, R-0      gemfibrozil (LOPID) 600 mg tablet Take 300 mg by mouth daily. , Historical Med      azaTHIOprine (IMURAN) 50 mg tablet Take 50 mg by mouth daily (after breakfast). , Historical Med         STOP taking these medications       minoxidil (LONITEN) 2.5 mg tablet Comments:   Reason for Stopping: losartan (COZAAR) 100 mg tablet Comments:   Reason for Stopping:         amLODIPine (NORVASC) 10 mg tablet Comments:   Reason for Stopping:                 NOTIFY YOUR PHYSICIAN FOR ANY OF THE FOLLOWING:   Fever over 101 degrees for 24 hours. Chest pain, shortness of breath, fever, chills, nausea, vomiting, diarrhea, change in mentation, falling, weakness, bleeding. Severe pain or pain not relieved by medications. Or, any other signs or symptoms that you may have questions about. DISPOSITION:   x Home With:   OT  PT  HH  RN       Long term SNF/Inpatient Rehab    Independent/assisted living    Hospice    Other:       PATIENT CONDITION AT DISCHARGE:     Functional status    Poor     Deconditioned    x Independent      Cognition    x Lucid     Forgetful     Dementia      Catheters/lines (plus indication)    Franco     PICC     PEG    x None      Code status     Full code     DNR      PHYSICAL EXAMINATION AT DISCHARGE:  General:          Alert, cooperative, no distress, appears stated age. HEENT:           Atraumatic, anicteric sclerae, pink conjunctivae                          No oral ulcers, mucosa moist, throat clear, dentition fair  Neck:               Supple, symmetrical  Lungs:             Clear to auscultation bilaterally. No Wheezing or Rhonchi. No rales. Chest wall:      No tenderness  No Accessory muscle use. Heart:              Regular  rhythm,  No  murmur   No edema  Abdomen:        Soft, non-tender. Not distended. Bowel sounds normal  Extremities:     No cyanosis. No clubbing,                            Skin turgor normal, Capillary refill normal  Skin:                Not pale. Not Jaundiced  No rashes   Psych:             Not anxious or agitated.   Neurologic:      Alert, moves all extremities, answers questions appropriately and responds to commands       CHRONIC MEDICAL DIAGNOSES:  Problem List as of 3/16/2020 Date Reviewed: 7/31/2019          Codes Class Noted - Resolved    Pneumonia ICD-10-CM: J18.9  ICD-9-CM: 252  3/14/2020 - Present        Seizure ([facility] ICD-10-CM: R56.9  ICD-9-CM: 780.39  10/18/2019 - Present        HCAP (healthcare-associated pneumonia) ICD-10-CM: J18.9  ICD-9-CM: 131  7/16/2019 - Present        Anemia due to blood loss ICD-10-CM: D50.0  ICD-9-CM: 280.0  4/21/2019 - Present        Vaginal bleeding ICD-10-CM: N93.9  ICD-9-CM: 623.8  4/18/2019 - Present        Hyperkalemia ICD-10-CM: E87.5  ICD-9-CM: 276.7  3/4/2019 - Present        ESRD on hemodialysis ([facility] ICD-10-CM: N18.6, Z99.2  ICD-9-CM: 585.6, V45.11  8/27/2018 - Present        SOB (shortness of breath) ICD-10-CM: R06.02  ICD-9-CM: 786.05  8/15/2018 - Present        Rib pain on right side ICD-10-CM: R07.81  ICD-9-CM: 786.50  7/2/2018 - Present        Troponin level elevated ICD-10-CM: R79.89  ICD-9-CM: 790.6  6/17/2018 - Present        Intra-abdominal abscess ([facility] ICD-10-CM: K65.1  ICD-9-CM: 567.22  5/12/2018 - Present        Sepsis ([facility] ICD-10-CM: A41.9  ICD-9-CM: 038.9, 995.91  4/29/2018 - Present        Generalized abdominal pain ICD-10-CM: R10.84  ICD-9-CM: 789.07  4/4/2018 - Present        Other constipation ICD-10-CM: K59.09  ICD-9-CM: 564.09  4/4/2018 - Present        Other ascites ICD-10-CM: R18.8  ICD-9-CM: 789.59  4/4/2018 - Present        Peritonitis ([facility] ICD-10-CM: K65.9  ICD-9-CM: 567.9  3/11/2018 - Present        History of pulmonary embolism ICD-10-CM: W57.633  ICD-9-CM: V12.55  12/8/2017 - Present        Hypomagnesemia ICD-10-CM: E83.42  ICD-9-CM: 275.2  10/30/2017 - Present        Hypocalcemia ICD-10-CM: E83.51  ICD-9-CM: 275.41  10/30/2017 - Present        Hypokalemia ICD-10-CM: E87.6  ICD-9-CM: 276.8  10/27/2017 - Present        Hypertension (Chronic) ICD-10-CM: I10  ICD-9-CM: 401.9  10/14/2017 - Present        Chronic bilateral low back pain without sciatica ICD-10-CM: M54.5, G89.29  ICD-9-CM: 724.2, 338.29  5/26/2017 - Present        Anemia of renal disease ICD-10-CM: N18.9, D63.1  ICD-9-CM: 285.21  2/21/2017 - Present        Dependence on peritoneal dialysis [facility]) ICD-10-CM: Z99.2  ICD-9-CM: V45.11  2/21/2017 - Present        ACP (advance care planning) ICD-10-CM: Z71.89  ICD-9-CM: V65.49  2/21/2017 - Present        Malignant hypertension ICD-10-CM: I10  ICD-9-CM: 401.0  7/11/2016 - Present        Encounter for monitoring opioid maintenance therapy ICD-10-CM: Z51.81, Z79.891  ICD-9-CM: V58.83, V58.69  11/3/2015 - Present        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  ICD-9-CM: 710.0, 583.81  3/10/2012 - Present        Lupus ICD-10-CM: M32.9  ICD-9-CM: 710.0  2/27/2012 - Present        RESOLVED: Emesis, persistent ICD-10-CM: R11.15  ICD-9-CM: 536.2  8/11/2019 - 8/14/2019        RESOLVED: SBO (small bowel obstruction) ([facility] ICD-10-CM: S84.757  ICD-9-CM: 560.9  7/31/2019 - 8/14/2019        RESOLVED: Neutropenia ([facility] ICD-10-CM: D70.9  ICD-9-CM: 288.00  9/15/2018 - 9/19/2018        RESOLVED: Anemia in chronic kidney disease ICD-10-CM: N18.9, D63.1  ICD-9-CM: 285.21  9/15/2018 - 9/19/2018        RESOLVED: Malignant hypertensive urgency ICD-10-CM: I16.0  ICD-9-CM: 401.0  9/10/2018 - 9/19/2018        RESOLVED: Hypertensive urgency ICD-10-CM: I16.0  ICD-9-CM: 401.9  7/19/2018 - 7/22/2018        RESOLVED: Sepsis ([facility] ICD-10-CM: A41.9  ICD-9-CM: 038.9, 995.91  12/12/2017 - 12/22/2017        RESOLVED: Pneumonia ICD-10-CM: J18.9  ICD-9-CM: 486  10/14/2017 - 12/8/2017        RESOLVED: Pleural effusion, left ICD-10-CM: J90  ICD-9-CM: 511.9  10/14/2017 - 12/8/2017        RESOLVED: ESRD on peritoneal dialysis ([facility] (Chronic) ICD-10-CM: N18.6, Z99.2  ICD-9-CM: 585.6, V45.11  10/7/2016 - 8/27/2018        RESOLVED: Idiopathic chronic inflammatory bowel disease ICD-10-CM: K63.89  ICD-9-CM: 558.9  8/28/2013 - 8/28/2013        RESOLVED: Abdominal pain ICD-10-CM: R10.9  ICD-9-CM: 789.00  8/28/2013 - 9/3/2013        RESOLVED: Hypoxia ICD-10-CM: R09.02  ICD-9-CM: 799.02  4/25/2013 - 4/30/2013        RESOLVED: Lupus nephritis [facility]) ICD-10-CM: M32.14  ICD-9-CM: 710.0, 583.81  4/27/2012 - 4/28/2012              Greater than 30 minutes were spent with the patient on counseling and coordination of care    Signed:    Amada Villa MD  3/16/2020  10:16 PM

## 2020-03-17 NOTE — PROGRESS NOTES
COVID-19 Screening Initial Follow-up Note    Patient contacted regarding COVID-19  risk. Patient tested negative for COVID 19 this admission. Care Transition Nurse/ Ambulatory Care Manager contacted the patient by telephone to perform post discharge assessment. Verified name and  with patient as identifiers. Provided introduction to self, and explanation of the CTN/ACM role, and reason for call due to risk factors for infection and/or exposure to COVID-19. Symptoms reviewed with patient who verbalized the following symptoms:   Fever no    Fatigue no   Pain or aching joints no  Cough no  Shortness of breath no    Confusion or unusual change in mental status no    Chills or shaking no   Patient reports she had chills 2 days ago but not now   Sweating no    Fast heart rate no    Fast breathing no    Dizziness/lightheadedness no    Less urine output no Pt on dialysis   Cold, clammy, and pale skin no  Low body temperature no       Due to onset/worsening of new symptoms, encounter routed to provider for escalation. Patient has following risk factors of: Dialysis patient, pneumonia, immunocompromised CTN/ACM reviewed discharge instructions, medical action plan and red flags such as increased shortness of breath, increasing fever and signs of decompensation with patient who verbalized understanding. Discussed exposure protocols and quarantine with CDC Guidelines What to do if you are sick with coronavirus disease 2019 Patient who was given an opportunity for questions and concerns. The patient agrees to contact the [facility] exposure line, local health department and PCP office for questions related to their healthcare. CTN provided contact information for future reference.     Reviewed and educated patient on any new and changed medications related to discharge diagnosis     Plan for follow-up call in 5-7 days based on severity of symptoms and risk factors

## 2020-03-19 LAB
BACTERIA SPEC CULT: NORMAL
SERVICE CMNT-IMP: NORMAL

## 2020-03-24 NOTE — PROGRESS NOTES
ADULT PROTOCOL: JET AEROSOL ASSESSMENT    Patient  Roxann Herrera     29 y.o.   female     8/17/2018  7:43 PM    Breath Sounds Pre Procedure: Right Breath Sounds: Diminished                               Left Breath Sounds: Diminished    Breath Sounds Post Procedure: Right Breath Sounds: Diminished                                 Left Breath Sounds: Diminished    Breathing pattern: Pre procedure Breathing Pattern: Regular          Post procedure Breathing Pattern: Regular    Heart Rate: Pre procedure Pulse: 104           Post procedure      Resp Rate: Pre procedure Respirations: 18           Post procedure      Peak Flow: Pre bronchodilator             Post bronchodilator       FVC/FEV1:  na    Incentive Spirometry:             Cough: Pre procedure                 Post procedure      Suctioned: NO    Sputum: Pre procedure                   Post procedure      Oxygen: O2 Device: Room air        Changed: NO    SpO2: Pre procedure SpO2: 97 %   without oxygen              Post procedure    without oxygen    Nebulizer Therapy: Current medications Aerosolized Medications: DuoNeb      Changed: YES    Smoking History: never    Problem List:   Patient Active Problem List   Diagnosis Code    Lupus L93.0    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    Encounter for monitoring opioid maintenance therapy Z51.81, Z79.891    Malignant hypertension I10    [facility] ([facility] N18.6, Z99.2    Anemia of renal disease D63.1    Dependence on peritoneal dialysis ([facility] Z99.2    ACP (advance care planning) Z71.89    Chronic bilateral low back pain without sciatica M54.5, G89.29    Hypertension I10    Hypokalemia E87.6    Hypomagnesemia E83.42    Hypocalcemia E83.51    History of pulmonary embolism Z86.711    Peritonitis (HCC) K65.9    Generalized abdominal pain R10.84    Other constipation K59.09    Other ascites R18.8    Sepsis (HCC) A41.9    Intra-abdominal abscess (HCC) K65.1    Troponin level elevated R74.8 Telephone follow-up due to coronavirus pandemic.  Patient is agreeable to telephone follow-up and grateful for the opportunity to comply with social distancing and government mandated activity restrictions.  Total duration of visit 20 minutes.    This is a yearly scheduled follow-up visit for hypertension and dilated cardiomyopathy, resolved with aggressive medical therapy and CRT.  He is doing well and is asymptomatic from a cardiac standpoint.  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  He is not having any limiting cardiac symptoms or medication tolerance issues.  Some easy bruising on Eliquis.  No ICD discharges.    Since his last visit with me, he was hospitalized (December, 2019) with pneumonia.  He had atrial fibrillation noted at the time of admission.  This subsequently converted spontaneously to normal sinus rhythm.  He has been on systemic anticoagulation with Eliquis since that time.    He was seen by Dr. Pascal for office follow-up in January.  Blood pressure at that time was 140/90.  Patient checked his home blood pressure monitor prior to this phone call and reports of blood pressure 145/75.  Pulse 78.  Reported weight 245 pounds.    Recent device clinic follow-up showed no mode switching.  Atrial sensing with ventricular pacing.  No nonsustained ventricular arrhythmias.  Echocardiogram from December 2019 showed ejection fraction of 50%.  Blood work at that time showed sodium 142, potassium 4.2, BUN 20 with creatinine of 1.  Fasting glucose 97.  AST/ALT 29/47.  CBC unremarkable.    Impression/plan-Resolved dilated cardiomyopathy, likely hypertensive in origin.  LVEF low normal on aggressive medical therapy and with CRT.  Continue present medical regimen.  He is due for follow-up blood work.  I gave him an order for this to be done prior to his scheduled follow-up visit with Dr. Pascal.    History of atrial fibrillation,  currently in normal sinus rhythm.  Maintaining normal sinus rhythm on sotalol.  Remains on systemic anticoagulation with Eliquis.  Continue same for now.  Follow-up with electrophysiology 4/15/2020 as scheduled.  This may end up being a virtual visit as well given the current status of the coronavirus pandemic.    Systemic hypertension.  Blood pressure readings are adequate for him, given his prior history and prior blood pressure readings.  Continue present medical regimen.    Return for follow-up with me in 1 year, sooner as needed.  All appropriate medications were reviewed and refilled for him.    NABIL Masterson MD    Rib pain on right side R07.81    SOB (shortness of breath) R06.02       Respiratory Therapist: Maira Cooper  ADULT PROTOCOL: JET AEROSOL ASSESSMENT    Patient  Marybeth Augustine     29 y.o.   female     8/17/2018  7:43 PM    Breath Sounds Pre Procedure: Right Breath Sounds: Diminished                               Left Breath Sounds: Diminished    Breath Sounds Post Procedure: Right Breath Sounds: Diminished                                 Left Breath Sounds: Diminished    Breathing pattern: Pre procedure Breathing Pattern: Regular          Post procedure Breathing Pattern: Regular    Heart Rate: Pre procedure Pulse: 104           Post procedure      Resp Rate: Pre procedure Respirations: 18           Post procedure      Peak Flow: Pre bronchodilator             Post bronchodilator       FVC/FEV1:  NA    Incentive Spirometry:             Cough: Pre procedure                 Post procedure      Suctioned: YES    Sputum: Pre procedure                   Post procedure      Oxygen: O2 Device: Room air        Changed: YES    SpO2: Pre procedure SpO2: 97 %   without oxygen              Post procedure    without oxygen    Nebulizer Therapy: Current medications Aerosolized Medications: DuoNeb      Changed: YES    Smoking History: never    Problem List:   Patient Active Problem List   Diagnosis Code    Lupus L93.0    [facility] M32.14    Encounter for monitoring opioid maintenance therapy Z51.81, Z79.891    Malignant hypertension I10    ESRD on peritoneal dialysis ([facility] N18.6, Z99.2    Anemia of renal disease D63.1    Dependence on peritoneal dialysis ([facility] Z99.2    ACP (advance care planning) Z71.89    Chronic bilateral low back pain without sciatica M54.5, G89.29    Hypertension I10    Hypokalemia E87.6    Hypomagnesemia E83.42    Hypocalcemia E83.51    History of pulmonary embolism Z86.711    Peritonitis (HCC) K65.9    Generalized abdominal pain R10.84    Other constipation K59.09  Other ascites R18.8    Sepsis ([facility] A41.9    Intra-abdominal abscess (HCC) K65.1    Troponin level elevated R74.8    Rib pain on right side R07.81    SOB (shortness of breath) R06.02       Respiratory Therapist: Fernando Lam  ADULT PROTOCOL: JET AEROSOL ASSESSMENT    Patient  Jerome Chahal     29 y.o.   female     8/17/2018  7:43 PM    Breath Sounds Pre Procedure: Right Breath Sounds: Diminished                               Left Breath Sounds: Diminished    Breath Sounds Post Procedure: Right Breath Sounds: Diminished                                 Left Breath Sounds: Diminished    Breathing pattern: Pre procedure Breathing Pattern: Regular          Post procedure Breathing Pattern: Regular    Heart Rate: Pre procedure Pulse: 104           Post procedure      Resp Rate: Pre procedure Respirations: 18           Post procedure      Peak Flow: Pre bronchodilator             Post bronchodilator       FVC/FEV1:  na    Incentive Spirometry:             Cough: Pre procedure                 Post procedure      Suctioned: NO    Sputum: Pre procedure                   Post procedure      Oxygen: O2 Device: Room air        Changed: NO    SpO2: Pre procedure SpO2: 97 %   without oxygen              Post procedure    without oxygen    Nebulizer Therapy: Current medications Aerosolized Medications: DuoNeb      Changed: YES    Smoking History: never    Problem List:   Patient Active Problem List   Diagnosis Code    Lupus L93.0    [facility] ([facility] M32.14    Encounter for monitoring opioid maintenance therapy Z51.81, Z79.891    Malignant hypertension I10    ESRD on peritoneal dialysis ([facility] N18.6, Z99.2    Anemia of renal disease D63.1    Dependence on [facility] ([facility] Z99.2    ACP (advance care planning) Z71.89    Chronic bilateral low back pain without sciatica M54.5, G89.29    Hypertension I10    Hypokalemia E87.6    Hypomagnesemia E83.42    Hypocalcemia E83.51    History of pulmonary embolism Z86.711    Peritonitis (HCC) K65.9    Generalized abdominal pain R10.84    Other constipation K59.09    Other ascites R18.8    Sepsis (HCC) A41.9    Intra-abdominal abscess (HCC) K65.1    Troponin level elevated R74.8    Rib pain on right side R07.81    SOB (shortness of breath) R06.02       Respiratory Therapist: Ramón Ortez

## 2020-03-28 ENCOUNTER — HOSPITAL ENCOUNTER (EMERGENCY)
Age: 35
Discharge: HOME | End: 2020-03-28
Attending: EMERGENCY MEDICINE
Payer: MEDICARE

## 2020-03-28 ENCOUNTER — APPOINTMENT (OUTPATIENT)
Dept: CT IMAGING | Age: 35
End: 2020-03-28
Attending: EMERGENCY MEDICINE
Payer: MEDICARE

## 2020-03-28 VITALS
RESPIRATION RATE: 16 BRPM | SYSTOLIC BLOOD PRESSURE: 181 MMHG | HEART RATE: 87 BPM | DIASTOLIC BLOOD PRESSURE: 118 MMHG | WEIGHT: 204.37 LBS | BODY MASS INDEX: 34.01 KG/M2 | OXYGEN SATURATION: 97 % | TEMPERATURE: 98.2 F

## 2020-03-28 DIAGNOSIS — R10.31 RLQ ABDOMINAL PAIN: ICD-10-CM

## 2020-03-28 DIAGNOSIS — N83.202 CYSTS OF BOTH OVARIES: Primary | ICD-10-CM

## 2020-03-28 DIAGNOSIS — N83.201 CYSTS OF BOTH OVARIES: Primary | ICD-10-CM

## 2020-03-28 LAB
ALBUMIN SERPL-MCNC: 4 G/DL (ref 3.5–5)
ALBUMIN/GLOB SERPL: 0.7 {RATIO} (ref 1.1–2.2)
ALP SERPL-CCNC: 57 U/L (ref 45–117)
ALT SERPL-CCNC: 14 U/L (ref 12–78)
ANION GAP SERPL CALC-SCNC: 15 MMOL/L (ref 5–15)
AST SERPL-CCNC: 26 U/L (ref 15–37)
BASOPHILS # BLD: 0 K/UL (ref 0–0.1)
BASOPHILS NFR BLD: 1 % (ref 0–1)
BILIRUB SERPL-MCNC: 0.5 MG/DL (ref 0.2–1)
BUN SERPL-MCNC: 45 MG/DL (ref 6–20)
BUN/CREAT SERPL: 6 (ref 12–20)
CALCIUM SERPL-MCNC: 9.5 MG/DL (ref 8.5–10.1)
CHLORIDE SERPL-SCNC: 94 MMOL/L (ref 97–108)
CO2 SERPL-SCNC: 27 MMOL/L (ref 21–32)
CREAT SERPL-MCNC: 7.64 MG/DL (ref 0.55–1.02)
DIFFERENTIAL METHOD BLD: ABNORMAL
EOSINOPHIL # BLD: 0 K/UL (ref 0–0.4)
EOSINOPHIL NFR BLD: 1 % (ref 0–7)
ERYTHROCYTE [DISTWIDTH] IN BLOOD BY AUTOMATED COUNT: 15.1 % (ref 11.5–14.5)
GLOBULIN SER CALC-MCNC: 5.8 G/DL (ref 2–4)
GLUCOSE SERPL-MCNC: 89 MG/DL (ref 65–100)
HCG SERPL QL: NEGATIVE
HCT VFR BLD AUTO: 30.4 % (ref 35–47)
HGB BLD-MCNC: 9.3 G/DL (ref 11.5–16)
IMM GRANULOCYTES # BLD AUTO: 0 K/UL (ref 0–0.04)
IMM GRANULOCYTES NFR BLD AUTO: 1 % (ref 0–0.5)
LIPASE SERPL-CCNC: 161 U/L (ref 73–393)
LYMPHOCYTES # BLD: 0.9 K/UL (ref 0.8–3.5)
LYMPHOCYTES NFR BLD: 28 % (ref 12–49)
MCH RBC QN AUTO: 30.4 PG (ref 26–34)
MCHC RBC AUTO-ENTMCNC: 30.6 G/DL (ref 30–36.5)
MCV RBC AUTO: 99.3 FL (ref 80–99)
MONOCYTES # BLD: 0.4 K/UL (ref 0–1)
MONOCYTES NFR BLD: 11 % (ref 5–13)
NEUTS SEG # BLD: 1.9 K/UL (ref 1.8–8)
NEUTS SEG NFR BLD: 59 % (ref 32–75)
NRBC # BLD: 0 K/UL (ref 0–0.01)
NRBC BLD-RTO: 0 PER 100 WBC
PLATELET # BLD AUTO: 248 K/UL (ref 150–400)
PMV BLD AUTO: 10.7 FL (ref 8.9–12.9)
POTASSIUM SERPL-SCNC: 5.2 MMOL/L (ref 3.5–5.1)
PROT SERPL-MCNC: 9.8 G/DL (ref 6.4–8.2)
RBC # BLD AUTO: 3.06 M/UL (ref 3.8–5.2)
SODIUM SERPL-SCNC: 136 MMOL/L (ref 136–145)
WBC # BLD AUTO: 3.2 K/UL (ref 3.6–11)

## 2020-03-28 PROCEDURE — 85025 COMPLETE CBC W/AUTO DIFF WBC: CPT

## 2020-03-28 PROCEDURE — 84703 CHORIONIC GONADOTROPIN ASSAY: CPT

## 2020-03-28 PROCEDURE — 96375 TX/PRO/DX INJ NEW DRUG ADDON: CPT

## 2020-03-28 PROCEDURE — 99284 EMERGENCY DEPT VISIT MOD MDM: CPT

## 2020-03-28 PROCEDURE — 80053 COMPREHEN METABOLIC PANEL: CPT

## 2020-03-28 PROCEDURE — 96374 THER/PROPH/DIAG INJ IV PUSH: CPT

## 2020-03-28 PROCEDURE — 36415 COLL VENOUS BLD VENIPUNCTURE: CPT

## 2020-03-28 PROCEDURE — 83690 ASSAY OF LIPASE: CPT

## 2020-03-28 PROCEDURE — 74011250636 HC RX REV CODE- 250/636: Performed by: EMERGENCY MEDICINE

## 2020-03-28 PROCEDURE — 74176 CT ABD & PELVIS W/O CONTRAST: CPT

## 2020-03-28 PROCEDURE — 74011250637 HC RX REV CODE- 250/637: Performed by: EMERGENCY MEDICINE

## 2020-03-28 RX ORDER — ONDANSETRON 4 MG/1
4 TABLET, ORALLY DISINTEGRATING ORAL
Qty: 15 TAB | Refills: 0 | Status: SHIPPED | OUTPATIENT
Start: 2020-03-28

## 2020-03-28 RX ORDER — ONDANSETRON 2 MG/ML
4 INJECTION INTRAMUSCULAR; INTRAVENOUS
Status: COMPLETED | OUTPATIENT
Start: 2020-03-28 | End: 2020-03-28

## 2020-03-28 RX ORDER — HYDROMORPHONE HYDROCHLORIDE 1 MG/ML
0.5 INJECTION, SOLUTION INTRAMUSCULAR; INTRAVENOUS; SUBCUTANEOUS ONCE
Status: COMPLETED | OUTPATIENT
Start: 2020-03-28 | End: 2020-03-28

## 2020-03-28 RX ORDER — OXYCODONE AND ACETAMINOPHEN 5; 325 MG/1; MG/1
1 TABLET ORAL
Qty: 5 TAB | Refills: 0 | Status: SHIPPED | OUTPATIENT
Start: 2020-03-28 | End: 2020-03-31

## 2020-03-28 RX ORDER — OXYCODONE AND ACETAMINOPHEN 5; 325 MG/1; MG/1
1 TABLET ORAL ONCE
Status: COMPLETED | OUTPATIENT
Start: 2020-03-28 | End: 2020-03-28

## 2020-03-28 RX ADMIN — OXYCODONE HYDROCHLORIDE AND ACETAMINOPHEN 1 TABLET: 5; 325 TABLET ORAL at 21:27

## 2020-03-28 RX ADMIN — HYDROMORPHONE HYDROCHLORIDE 0.5 MG: 1 INJECTION, SOLUTION INTRAMUSCULAR; INTRAVENOUS; SUBCUTANEOUS at 20:04

## 2020-03-28 RX ADMIN — ONDANSETRON 4 MG: 2 INJECTION, SOLUTION INTRAMUSCULAR; INTRAVENOUS at 20:04

## 2020-03-28 NOTE — ED TRIAGE NOTES
Triage Note: Patient complains of right sided abdominal pain that radiates into right groin that started this morning. +nausea. Patient produces very little urine due to kidney disease. LBM was yesterday.

## 2020-03-29 ENCOUNTER — HOSPITAL ENCOUNTER (EMERGENCY)
Age: 35
Discharge: HOME | End: 2020-03-29
Attending: EMERGENCY MEDICINE
Payer: MEDICARE

## 2020-03-29 ENCOUNTER — APPOINTMENT (OUTPATIENT)
Dept: ULTRASOUND IMAGING | Age: 35
End: 2020-03-29
Attending: EMERGENCY MEDICINE
Payer: MEDICARE

## 2020-03-29 VITALS
TEMPERATURE: 98.5 F | SYSTOLIC BLOOD PRESSURE: 175 MMHG | BODY MASS INDEX: 34.01 KG/M2 | WEIGHT: 204.37 LBS | HEART RATE: 94 BPM | RESPIRATION RATE: 16 BRPM | DIASTOLIC BLOOD PRESSURE: 109 MMHG | OXYGEN SATURATION: 95 %

## 2020-03-29 DIAGNOSIS — N83.209 HEMORRHAGIC OVARIAN CYST: Primary | ICD-10-CM

## 2020-03-29 PROCEDURE — 76830 TRANSVAGINAL US NON-OB: CPT

## 2020-03-29 PROCEDURE — 74011250637 HC RX REV CODE- 250/637: Performed by: EMERGENCY MEDICINE

## 2020-03-29 PROCEDURE — 76856 US EXAM PELVIC COMPLETE: CPT

## 2020-03-29 PROCEDURE — 99282 EMERGENCY DEPT VISIT SF MDM: CPT

## 2020-03-29 RX ORDER — ONDANSETRON 4 MG/1
4 TABLET, ORALLY DISINTEGRATING ORAL
Status: COMPLETED | OUTPATIENT
Start: 2020-03-29 | End: 2020-03-29

## 2020-03-29 RX ORDER — OXYCODONE AND ACETAMINOPHEN 5; 325 MG/1; MG/1
1 TABLET ORAL ONCE
Status: COMPLETED | OUTPATIENT
Start: 2020-03-29 | End: 2020-03-29

## 2020-03-29 RX ADMIN — OXYCODONE HYDROCHLORIDE AND ACETAMINOPHEN 1 TABLET: 5; 325 TABLET ORAL at 06:13

## 2020-03-29 RX ADMIN — ONDANSETRON 4 MG: 4 TABLET, ORALLY DISINTEGRATING ORAL at 07:55

## 2020-03-29 NOTE — ED NOTES
7:00 AM  Change of shift. Care of patient taken over from Dr Amadeo Leroy; H&P reviewed, bedside handoff complete. Awaiting pelvic US.     7:58 AM  B/l hemorrhagic cysts evident. Will discharge with Gyn f/u. Needs to fill prescriptions from overnight.

## 2020-03-29 NOTE — ED PROVIDER NOTES
81-year-old with extensive past medical history including ESRD on HD, HTN, HLD, SLE, prior PE, on Eliquis presents to the emergency department noting a 1 day history of right-sided abdominal pain which started around 10 or 11 AM today. She notes associated nausea but no vomiting. She rates her pain as 8/10 and constant. She took a dose of Tylenol and Aleve earlier today to no avail of her symptoms. She denies any fever, chills, vomiting, chest pain, shortness of breath, cough, URI symptoms, diarrhea. She states that last bowel movement was yesterday morning and was normal for her with no blood or melena. She states that she only very occasionally urinates, secondary to her ESRD, but denies any symptoms with it. She denies any suspicious food intake or recent travel or known sick contacts. She has a history of prior peritoneal dialysis and prior SBO, but no other significant abdominal surgical history. Past Medical History:   Diagnosis Date    Anemia     secondary to lupus    Asthma     no inhaler use in past 2 to 3 years    Carditis     Chronic kidney disease     ESRD    Chronic pain     DDD (degenerative disc disease), lumbar     ESRD (end stage renal disease) (HCC)     GERD (gastroesophageal reflux disease)     Hemodialysis patient ([facility] 12/21/2017    [address]  Tuesday,  Thursday,  and Saturday.  Hypercholesterolemia     Hypertension     Intractable nausea and vomiting 10/21/2015    Long term (current) use of anticoagulants     Lupus (systemic lupus erythematosus) (HCC)     Malignant hypertension with chronic kidney disease stage V ([facility]     Peritoneal dialysis status ([facility] 10/2015    x 2 years Stopped 12/2017 due to infection and removed.     Poor historian 01/17/2018    With medications    Thromboembolus [facility]) 2013    lungs    Transfusion history     Last Transfusion 12/21/2017  at [facility]       Past Surgical History:   Procedure Laterality Date    HX  SECTION  11/2006    x1    HX OTHER SURGICAL  9/16/15    INSERTION PD CATH;  Removed 2017    HX VASCULAR ACCESS Right 2017    Double-Lumen henry catheter upper chest    HX VASCULAR ACCESS Right 2018    right upper arm         Family History:   Problem Relation Age of Onset    Diabetes Father     Hypertension Father     Cancer Other         aunt with breast cancer    Diabetes Mother        Social History     Socioeconomic History    Marital status: SINGLE     Spouse name: Not on file    Number of children: Not on file    Years of education: Not on file    Highest education level: Not on file   Occupational History    Not on file   Social Needs    Financial resource strain: Not on file    Food insecurity     Worry: Not on file     Inability: Not on file    Transportation needs     Medical: Not on file     Non-medical: Not on file   Tobacco Use    Smoking status: Never Smoker    Smokeless tobacco: Never Used   Substance and Sexual Activity    Alcohol use: No    Drug use: Yes     Types: Prescription, OTC    Sexual activity: Not Currently   Lifestyle    Physical activity     Days per week: Not on file     Minutes per session: Not on file    Stress: Not on file   Relationships    Social connections     Talks on phone: Not on file     Gets together: Not on file     Attends [employer] service: Not on file     Active member of club or organization: Not on file     Attends meetings of clubs or organizations: Not on file     Relationship status: Not on file    Intimate partner violence     Fear of current or ex partner: Not on file     Emotionally abused: Not on file     Physically abused: Not on file     Forced sexual activity: Not on file   Other Topics Concern     Service No    Blood Transfusions No    Caffeine Concern No    Occupational Exposure No    Hobby Hazards No    Sleep Concern No    Stress Concern No    Weight Concern No    Special Diet No    Back Care Yes Comment: low back pain    Exercise No    Bike Helmet No    Seat Belt Yes    Self-Exams No   Social History Narrative    Lives with parents and daughter. ALLERGIES: Compazine [prochlorperazine edisylate]    Review of Systems   Constitutional: Positive for activity change and appetite change. Negative for chills and fever. HENT: Negative for congestion, rhinorrhea, sinus pressure, sneezing and sore throat. Eyes: Negative for photophobia and visual disturbance. Respiratory: Negative for cough and shortness of breath. Cardiovascular: Negative for chest pain. Gastrointestinal: Positive for abdominal pain and nausea. Negative for blood in stool, constipation, diarrhea and vomiting. Genitourinary: Positive for difficulty urinating (Chronic, Due to ESRD.). Negative for dysuria, flank pain, frequency, hematuria, menstrual problem, urgency, vaginal bleeding and vaginal discharge. Musculoskeletal: Negative for arthralgias, back pain, myalgias and neck pain. Skin: Negative for rash and wound. Neurological: Negative for syncope, weakness, numbness and headaches. Psychiatric/Behavioral: Negative for self-injury and suicidal ideas. All other systems reviewed and are negative. Vitals:    03/28/20 1944 03/28/20 1945   BP: (!) 176/110 (!) 177/123   Pulse: 92 87   Resp: 16    Temp: 98.1 °F (36.7 °C)    SpO2: 96% 99%   Weight: 92.7 kg (204 lb 5.9 oz)             Physical Exam  Vitals signs and nursing note reviewed. Constitutional:       General: She is not in acute distress. Appearance: Normal appearance. She is well-developed. She is obese. She is not diaphoretic. HENT:      Head: Normocephalic and atraumatic. Nose: Nose normal.   Eyes:      Extraocular Movements: Extraocular movements intact. Conjunctiva/sclera: Conjunctivae normal.      Pupils: Pupils are equal, round, and reactive to light. Neck:      Musculoskeletal: Neck supple.    Cardiovascular:      Rate and Rhythm: Normal rate and regular rhythm. Heart sounds: Normal heart sounds. Pulmonary:      Effort: Pulmonary effort is normal.      Breath sounds: Normal breath sounds. Abdominal:      General: A surgical scar is present. There is no distension. Palpations: Abdomen is soft. Tenderness: There is abdominal tenderness in the right lower quadrant. There is no right CVA tenderness, left CVA tenderness, guarding or rebound. Negative signs include Guadarrama's sign. Musculoskeletal:         General: No tenderness. Skin:     General: Skin is warm and dry. Neurological:      General: No focal deficit present. Mental Status: She is alert and oriented to person, place, and time. Cranial Nerves: No cranial nerve deficit. Sensory: No sensory deficit. Motor: No weakness. Coordination: Coordination normal.          MDM   18-year-old female presents with right-sided abdominal pain is 1 day. Patient is afebrile, hypertensive, with other vital signs stable in no acute distress. Pain and nausea were treated with IV medications. As the patient is not tachycardic or showing signs of significant dehydration IV fluids were held due to ESRD status. Labs returned showing WBC 3.2, Hg 9.3, CMP shows evidence of ESRD, but unremarkable LFTs. K trace elevated at 5.2, but hemolyzed specimen. hCG negative    CT abdomen pelvis shows evidence of recently treated pneumonia, now symptomatically improved, likely gradually improving from previous. Normal appendix, but complex bilateral ovarian lesions with right-sided ovarian cyst with fluid/fluid levels. Patient denies any vaginal bleeding or discharge, this is likely the etiology for her symptoms. Patient states that she used to take Percocets regularly for pain and has been advised not to use NSAIDs due to her kidney disease.   Will Rx a couple Percocet for pain relief for the next couple of days but recommended close follow-up with her OB/GYN for further evaluation of her symptoms. Questions were given for worsening or concerns.         Procedures

## 2020-03-29 NOTE — ED PROVIDER NOTES
18-year-old female with extensive past medical history, as below, returns to the emergency department after she was evaluated earlier this evening with return of symptoms. She states that at time of discharge before she still had a small amount of discomfort in her right lower quadrant but starting around 3:00 her pain began to return. She did not fill the prescribed pain medication because of the pharmacy was closed reportedly. She denies any new symptoms but states that her symptoms that she experienced previously have returned. Again denies any vaginal bleeding or vaginal discharge. ED evaluation earlier this evening noted CT evidence of ovarian cysts and it was felt that this was likely etiology for her pain. Does not really urinate but very small amount every few days ESRD and atrophic kidneys. Records were reviewed and she was noted to have similar adnexal cystic lesion noted on prior CT in April 2019, thought to be possibly hemorrhagic cyst.           Past Medical History:   Diagnosis Date    Anemia     secondary to lupus    Asthma     no inhaler use in past 2 to 3 years    Carditis     Chronic kidney disease     ESRD    Chronic pain     DDD (degenerative disc disease), lumbar     ESRD (end stage renal disease) (HCC)     GERD (gastroesophageal reflux disease)     Hemodialysis patient ([facility] 12/21/2017    [address]  Tuesday,  Thursday,  and Saturday.  Hypercholesterolemia     Hypertension     Intractable nausea and vomiting 10/21/2015    Long term (current) use of anticoagulants     Lupus (systemic lupus erythematosus) (HCC)     Malignant hypertension with chronic kidney disease stage V ([facility]     Peritoneal dialysis status ([facility] 10/2015    x 2 years Stopped 12/2017 due to infection and removed.     Poor historian 01/17/2018    With medications    Thromboembolus [facility]) 2013    lungs    Transfusion history     Last Transfusion 12/21/2017  at [facility]       Past Surgical History:   Procedure Laterality Date    HX  SECTION  11/2006    x1    HX OTHER SURGICAL  9/16/15    INSERTION PD CATH;  Removed 2017    HX VASCULAR ACCESS Right 2017    Double-Lumen henry catheter upper chest    HX VASCULAR ACCESS Right 2018    right upper arm         Family History:   Problem Relation Age of Onset    Diabetes Father     Hypertension Father     Cancer Other         aunt with breast cancer    Diabetes Mother        Social History     Socioeconomic History    Marital status: SINGLE     Spouse name: Not on file    Number of children: Not on file    Years of education: Not on file    Highest education level: Not on file   Occupational History    Not on file   Social Needs    Financial resource strain: Not on file    Food insecurity     Worry: Not on file     Inability: Not on file    Transportation needs     Medical: Not on file     Non-medical: Not on file   Tobacco Use    Smoking status: Never Smoker    Smokeless tobacco: Never Used   Substance and Sexual Activity    Alcohol use: No    Drug use: Yes     Types: Prescription, OTC    Sexual activity: Not Currently   Lifestyle    Physical activity     Days per week: Not on file     Minutes per session: Not on file    Stress: Not on file   Relationships    Social connections     Talks on phone: Not on file     Gets together: Not on file     Attends Christianity service: Not on file     Active member of club or organization: Not on file     Attends meetings of clubs or organizations: Not on file     Relationship status: Not on file    Intimate partner violence     Fear of current or ex partner: Not on file     Emotionally abused: Not on file     Physically abused: Not on file     Forced sexual activity: Not on file   Other Topics Concern     Service No    Blood Transfusions No    Caffeine Concern No    Occupational Exposure No    Hobby Hazards No    Sleep Concern No    Stress Concern No    Weight Concern No    Special Diet No    Back Care Yes     Comment: low back pain    Exercise No    Bike Helmet No    Seat Belt Yes    Self-Exams No   Social History Narrative    Lives with parents and daughter. ALLERGIES: Compazine [prochlorperazine edisylate]    Review of Systems   Constitutional: Negative for activity change, appetite change, chills and fever. HENT: Negative for congestion, rhinorrhea, sinus pressure, sneezing and sore throat. Eyes: Negative for photophobia and visual disturbance. Respiratory: Negative for cough and shortness of breath. Cardiovascular: Negative for chest pain. Gastrointestinal: Positive for abdominal pain. Negative for blood in stool, constipation, diarrhea, nausea and vomiting. Genitourinary: Negative for difficulty urinating, dysuria, flank pain, frequency, hematuria, menstrual problem, urgency, vaginal bleeding and vaginal discharge. Musculoskeletal: Negative for arthralgias, back pain, myalgias and neck pain. Skin: Negative for rash and wound. Neurological: Negative for syncope, weakness, numbness and headaches. Psychiatric/Behavioral: Negative for self-injury and suicidal ideas. All other systems reviewed and are negative. Vitals:    03/29/20 0607   BP: (!) 166/108   Pulse: 94   Resp: 16   Temp: 98.5 °F (36.9 °C)   SpO2: 94%   Weight: 92.7 kg (204 lb 5.9 oz)            Physical Exam  Vitals signs and nursing note reviewed. Constitutional:       General: She is not in acute distress. Appearance: Normal appearance. She is well-developed. She is not diaphoretic. Comments: Uncomfortable appearing. HENT:      Head: Normocephalic and atraumatic. Nose: Nose normal.   Eyes:      Extraocular Movements: Extraocular movements intact. Conjunctiva/sclera: Conjunctivae normal.      Pupils: Pupils are equal, round, and reactive to light. Neck:      Musculoskeletal: Neck supple.    Cardiovascular:      Rate and Rhythm: Normal rate and regular rhythm. Heart sounds: Normal heart sounds. Pulmonary:      Effort: Pulmonary effort is normal.      Breath sounds: Normal breath sounds. Abdominal:      General: A surgical scar is present. There is no distension. Palpations: Abdomen is soft. Tenderness: There is abdominal tenderness in the right lower quadrant and suprapubic area. There is no right CVA tenderness, left CVA tenderness, guarding or rebound. Musculoskeletal:         General: No tenderness. Skin:     General: Skin is warm and dry. Neurological:      General: No focal deficit present. Mental Status: She is alert and oriented to person, place, and time. Cranial Nerves: No cranial nerve deficit. Sensory: No sensory deficit. Motor: No weakness. Coordination: Coordination normal.          MDM   23-year-old female returns to the ED with right lower quadrant pain. CT scan from earlier this evening showed adnexal cyst.  Concern for possible ovarian torsion, versus persistent pain from ovarian cyst, versus hemorrhagic cyst or  cyst rupture. Repeat CT imaging or laboratory evaluation is necessary at this time, pending ultrasound results. While awaiting ultrasound results her care was signed out to Dr. Melissa Mcneil at 7 AM.  Please see his note for further information regarding her remainder of her care while in the ED.     Procedures

## 2020-03-29 NOTE — ED NOTES
Dr. Torre Mt and I have reviewed discharge instructions with the patient. The patient verbalized understanding. Pt. Inez Geraldine with pain and instructed her to go and  get her RX from last night filled and to take zofran (which she has at home) for any nausea. Pt. Taken out via wheelchair by [facility].

## 2020-03-31 ENCOUNTER — PATIENT OUTREACH (OUTPATIENT)
Dept: CARDIOLOGY CLINIC | Age: 35
End: 2020-03-31

## 2020-04-21 RX ORDER — AMITRIPTYLINE HYDROCHLORIDE 25 MG/1
TABLET, FILM COATED ORAL
Qty: 30 TAB | Refills: 5 | Status: SHIPPED | OUTPATIENT
Start: 2020-04-21 | End: 2020-06-04

## 2020-06-04 ENCOUNTER — VIRTUAL VISIT (OUTPATIENT)
Dept: NEUROLOGY | Age: 35
End: 2020-06-04

## 2020-06-04 VITALS — HEIGHT: 65 IN | BODY MASS INDEX: 34.01 KG/M2

## 2020-06-04 DIAGNOSIS — G47.00 INSOMNIA, UNSPECIFIED TYPE: Primary | ICD-10-CM

## 2020-06-04 RX ORDER — AMITRIPTYLINE HYDROCHLORIDE 50 MG/1
100 TABLET, FILM COATED ORAL
Qty: 60 TAB | Refills: 5 | Status: SHIPPED | OUTPATIENT
Start: 2020-06-04 | End: 2020-10-08 | Stop reason: SDUPTHER

## 2020-06-04 NOTE — PROGRESS NOTES
Nano Solano is a 30 y.o. female who was seen by synchronous (real-time) audio-video technology on 6/4/2020. Consent: Nano Solano, who was seen by synchronous (real-time) audio-video technology, and/or her healthcare decision maker, is aware that this patient-initiated, Telehealth encounter on 6/4/2020 is a billable service, with coverage as determined by her insurance carrier. She is aware that she may receive a bill and has provided verbal consent to proceed: Yes. Assessment & Plan:       ICD-10-CM ICD-9-CM    1. Insomnia, unspecified type G47.00 780.52 amitriptyline (ELAVIL) 50 mg tablet       Renewed Rx Amitriptyline 100 mg qhs (with RFs)  F/u in 6 months    Subjective:     Nano Solano is a 30 y.o. female who was seen for chronic insomnia. Patient was seen in the past for back pain and insomnia, but after a being on hydrocodone for a long time, we agreed she should taper off it due to her age and concerns for long-term dependency. She stopped taking that after the visit in Dec 2019 and still has intermittent back pain, some days worse than others. She continues to have difficulty with falling asleep/ staying asleep and says Amitriptylien 50 mg two tabs at bedtime helps her stay asleep. She requests new Rx of that. No other/ new neurologic symptoms. Brief ROS: as noted above    ======================================    PMHx:   Past Medical History:   Diagnosis Date    Anemia     secondary to lupus    Asthma     no inhaler use in past 2 to 3 years    Carditis     Chronic kidney disease     ESRD    Chronic pain     DDD (degenerative disc disease), lumbar     ESRD (end stage renal disease) (HCC)     GERD (gastroesophageal reflux disease)     Hemod[facility] patient ([facility] [address] 12/21/2017    [address]  Tuesday,  Thursday,  and Saturday.      Hypercholesterolemia     Hypertension     Intractable nausea and vomiting 10/21/2015    Long term (current) use of anticoagulants     [facility]mat[facility]     Malignant hypertension with chronic kidney disease stage V ([facility]     Peritoneal dialysis status ([facility] 10/2015    x 2 years Stopped 2017 due to infection and removed.  Poor historian 2018    With medications    Thromboembolus [facility]) 2013    lungs    Transfusion history     Last Transfusion 2017  at [facility]       PSHx:  has a past surgical history that includes hx  section (2006); hx other surgical (9/16/15); hx vascular access (Right, 2017); and hx vascular access (Right, 2018). SocHx:  reports that she has never smoked. She has never used smokeless tobacco. She reports current drug use. Drugs: Prescription and OTC. She reports that she does not drink alcohol. FHx: family history includes Cancer in an other family member; Diabetes in her father and mother; Hypertension in her father. Allergies: Allergies   Allergen Reactions    Compazine [Prochlorperazine Edisylate] Nausea and Vomiting and Palpitations     \"hot and lightheaded\"       Medications:  Current Outpatient Medications   Medication Sig    amitriptyline (ELAVIL) 50 mg tablet Take 2 Tabs by mouth nightly. Antidepressant to help insomnia.  ondansetron (Zofran ODT) 4 mg disintegrating tablet Take 1 Tab by mouth every eight (8) hours as needed for Nausea.  pantoprazole (PROTONIX) 40 mg tablet Take 1 Tab by mouth ACB/HS.  ferric citrate (AURYXIA) 210 mg iron tablet Take 420 mg by mouth three (3) times daily (with meals).  carvedilol (COREG) 25 mg tablet Take 1 Tab by mouth two (2) times daily (with meals).  predniSONE (DELTASONE) 5 mg tablet Take 2 Tabs by mouth daily.  gemfibrozil (LOPID) 600 mg tablet Take 300 mg by mouth daily.  azaTHIOprine (IMURAN) 50 mg tablet Take 50 mg by mouth daily (after breakfast).  apixaban (ELIQUIS) 5 mg tablet Take 5 mg by mouth every twelve (12) hours.          Objective:   Vital Signs: (As obtained by patient/caregiver at home)  Visit Vitals  Ht 5' 5\" (1.651 m)   BMI 34.01 kg/m²        [INSTRUCTIONS:  \"[x]\" Indicates a positive item  \"[]\" Indicates a negative item  -- DELETE ALL ITEMS NOT EXAMINED]    Constitutional: [x] Appears well-developed and well-nourished [x] No apparent distress      [] Abnormal -     Mental status: [x] Alert and awake  [x] Oriented to person/place/time [x] Able to follow commands    [] Abnormal -     Eyes:   EOM    [x]  Normal    [] Abnormal -   Sclera  [x]  Normal    [] Abnormal -          Discharge []  None visible   [] Abnormal -     HENT: [x] Normocephalic, atraumatic  [] Abnormal -   [] Mouth/Throat: Mucous membranes are moist    External Ears [x] Normal  [] Abnormal -    Neck: [x] No visualized mass [] Abnormal -     Pulmonary/Chest: [x] Respiratory effort normal   [x] No visualized signs of difficulty breathing or respiratory distress        [] Abnormal -      Musculoskeletal:   [] Normal gait with no signs of ataxia         [x] Normal range of motion of neck        [] Abnormal -     Neurological:        [x] No Facial Asymmetry (Cranial nerve 7 motor function) (limited exam due to video visit)          [x] No gaze palsy        [] Abnormal -          Skin:        [x] No significant exanthematous lesions or discoloration noted on facial skin         [] Abnormal -            Psychiatric:       [x] Normal Affect [] Abnormal -        [x] No Hallucinations    Other pertinent observable physical exam findings:-      We discussed the expected course, resolution and complications of the diagnosis(es) in detail. Medication risks, benefits, costs, interactions, and alternatives were discussed as indicated. I advised her to contact the office if her condition worsens, changes or fails to improve as anticipated. She expressed understanding with the diagnosis(es) and plan.        Chi Loja is a 30 y.o. female who was evaluated by a video visit encounter for concerns as above. Patient identification was verified prior to start of the visit. A caregiver was present when appropriate. Due to this being a TeleHealth encounter (During EFMND-06 public health emergency), evaluation of the following organ systems was limited: Vitals/Constitutional/EENT/Resp/CV/GI//MS/Neuro/Skin/Heme-Lymph-Imm. Pursuant to the emergency declaration under the [facility], [facility] waiver authority and the [employer] Act, this Virtual  Visit was conducted, with patient's (and/or legal guardian's) consent, to reduce the patient's risk of exposure to COVID-19 and provide necessary medical care. Services were provided through a video synchronous discussion virtually to substitute for in-person clinic visit. Patient and provider were located at their individual homes.       Gautam Rees MD

## 2020-07-21 ENCOUNTER — TELEPHONE (OUTPATIENT)
Dept: NEUROLOGY | Age: 35
End: 2020-07-21

## 2020-07-21 NOTE — TELEPHONE ENCOUNTER
Pharmacy wants to know is it ok to fill pt's amitriptyline and also what's the correct dosage & directions for it

## 2020-07-22 NOTE — TELEPHONE ENCOUNTER
amitriptyline (ELAVIL) 50 mg tablet [[id number]]     Order Details   Dose: 100 mg  Route: Oral  Frequency: EVERY BEDTIME    Dispense Quantity: 60 Tab  Refills: 5  Fills remaining: --             Sig: Take 2 Tabs by mouth nightly.  Antidepressant to help insomnia.

## 2020-07-23 NOTE — TELEPHONE ENCOUNTER
Called pharmacy. They are not sure which medication patient needed. They believed she was looking for a new prescription. Informed them that elavil is the only med prescribed by this office and we do not have another script on file.

## 2020-09-08 ENCOUNTER — TELEPHONE (OUTPATIENT)
Dept: NEUROLOGY | Age: 35
End: 2020-09-08

## 2020-09-08 NOTE — TELEPHONE ENCOUNTER
Ester from [facility] wanted to clarify the dosage for Amitriptyline. She said the patient has been filling it early for the past few months.

## 2020-09-08 NOTE — TELEPHONE ENCOUNTER
Called pharmacy. Spoke with Jorje Villarreal. Verified dosing as prescribed. 50mg 2 tabs nightly. Spoke with JarredCowgill, pharmacist.   Filled medication twice in July and is trying to fill again twice in august.   Pharmacy informed of how office prescribed medication.

## 2020-10-08 ENCOUNTER — TELEPHONE (OUTPATIENT)
Dept: NEUROLOGY | Age: 35
End: 2020-10-08

## 2020-10-08 DIAGNOSIS — G47.00 INSOMNIA, UNSPECIFIED TYPE: ICD-10-CM

## 2020-10-08 RX ORDER — AMITRIPTYLINE HYDROCHLORIDE 50 MG/1
100 TABLET, FILM COATED ORAL
Qty: 60 TAB | Refills: 1 | Status: SHIPPED | OUTPATIENT
Start: 2020-10-08 | End: 2020-10-12

## 2020-10-08 NOTE — TELEPHONE ENCOUNTER
----- Message from [facility] Divisiondenise sent at 10/8/2020  1:13 PM EDT -----  Regarding: Dr. Jeronimo Duke (if not patient):  Relationship of caller (if not patient):  Best contact number(s): [phone]  Name of medication and dosage if known: \"Amtriptolyne\", 50mg  Is patient out of this medication (yes/no): N  Pharmacy name: [facility] listed in chart? (yes/no): Y  Pharmacy phone number: [phone]  Date of last visit: 6/4/20  Details to clarify the request:  Pt. is almost out of this medication and needs a refill sent to [facility].

## 2020-10-12 ENCOUNTER — TELEPHONE (OUTPATIENT)
Dept: NEUROLOGY | Age: 35
End: 2020-10-12

## 2020-10-12 DIAGNOSIS — G47.00 INSOMNIA, UNSPECIFIED TYPE: Primary | ICD-10-CM

## 2020-10-12 RX ORDER — AMITRIPTYLINE HYDROCHLORIDE 50 MG/1
150 TABLET, FILM COATED ORAL
Qty: 90 TAB | Refills: 2 | Status: SHIPPED | OUTPATIENT
Start: 2020-10-12 | End: 2020-12-03

## 2020-10-12 NOTE — TELEPHONE ENCOUNTER
R/t call to [facility] pharmacist. Patient has requested early fills multiple times. Script sent in 6/4 should have lasted 6 months.   Filled for 60 tabs on the following dates:  6/9  7/3  7/23  8/3  9/16  9/26  New script sent in 10/8 and patient requesting it to be filled today(she has called pharmacy 3 times today asking to pick it up today)

## 2020-10-12 NOTE — TELEPHONE ENCOUNTER
Called patient. Patient has been taking 3 tabs at night. Per Dr. Jassi Olmstead:  Contact pt and see how many Amitripytline tablets she's using at bedtime. Last Rx says 2 of the Amitriptyline 50 mg tabs QHS for insomnia.  I can correct the quantity so she's not requesting early refills so often.      Thanks

## 2020-10-12 NOTE — TELEPHONE ENCOUNTER
----- Message from Mallissa [place] sent at 10/12/2020 10:06 AM EDT -----  Regarding: Dr. Avinash Meza / refill  Caller (if not patient): Ester   Relationship of caller (if not patient):  [facility] pharmacist  Best contact number(s): [phone]  Name of medication and dosage if known: \"amitriptyline 50mg\"   Is patient out of this medication (yes/no): yes   Pharmacy name: [facility]   Pharmacy listed in chart? (yes/no): yes   Pharmacy phone number: [phone]  Date of last visit:6/4/2020  Details to clarify the request: Pt keeps requesting early refills so pharmacist would like to speak to nurse about pt excessively needing early refills

## 2020-10-21 ENCOUNTER — HOSPITAL ENCOUNTER (EMERGENCY)
Age: 35
Discharge: HOME | End: 2020-10-21
Attending: EMERGENCY MEDICINE
Payer: MEDICARE

## 2020-10-21 VITALS
OXYGEN SATURATION: 99 % | HEIGHT: 65 IN | WEIGHT: 211.42 LBS | DIASTOLIC BLOOD PRESSURE: 98 MMHG | RESPIRATION RATE: 16 BRPM | HEART RATE: 94 BPM | BODY MASS INDEX: 35.22 KG/M2 | TEMPERATURE: 99.8 F | SYSTOLIC BLOOD PRESSURE: 148 MMHG

## 2020-10-21 DIAGNOSIS — N75.1 BARTHOLIN'S GLAND ABSCESS: Primary | ICD-10-CM

## 2020-10-21 PROCEDURE — 99284 EMERGENCY DEPT VISIT MOD MDM: CPT

## 2020-10-21 PROCEDURE — 75810000113 HC INC/DRN BARHOLIN GLND ABCESS

## 2020-10-21 PROCEDURE — 74011250637 HC RX REV CODE- 250/637: Performed by: EMERGENCY MEDICINE

## 2020-10-21 PROCEDURE — 74011000250 HC RX REV CODE- 250: Performed by: EMERGENCY MEDICINE

## 2020-10-21 RX ORDER — CLINDAMYCIN HYDROCHLORIDE 150 MG/1
300 CAPSULE ORAL
Status: DISCONTINUED | OUTPATIENT
Start: 2020-10-21 | End: 2020-10-21

## 2020-10-21 RX ORDER — DOXYCYCLINE HYCLATE 100 MG
100 TABLET ORAL
Status: COMPLETED | OUTPATIENT
Start: 2020-10-21 | End: 2020-10-21

## 2020-10-21 RX ORDER — DOXYCYCLINE HYCLATE 100 MG
100 TABLET ORAL 2 TIMES DAILY
Qty: 20 TAB | Refills: 0 | Status: SHIPPED | OUTPATIENT
Start: 2020-10-21 | End: 2020-10-31

## 2020-10-21 RX ORDER — LIDOCAINE HYDROCHLORIDE AND EPINEPHRINE 10; 10 MG/ML; UG/ML
1.5 INJECTION, SOLUTION INFILTRATION; PERINEURAL
Status: COMPLETED | OUTPATIENT
Start: 2020-10-21 | End: 2020-10-21

## 2020-10-21 RX ADMIN — LIDOCAINE HYDROCHLORIDE,EPINEPHRINE BITARTRATE 15 MG: 10; .01 INJECTION, SOLUTION INFILTRATION; PERINEURAL at 19:09

## 2020-10-21 RX ADMIN — DOXYCYCLINE HYCLATE 100 MG: 100 TABLET, COATED ORAL at 19:50

## 2020-10-21 NOTE — ED NOTES
Word catheter placed by Dr. Jackson Ruteldge. Patient tolerated procedure well. Patient verbalizes understanding of care. Denies any questions or concerns at this time.

## 2020-10-21 NOTE — ED PROVIDER NOTES
41-year-old female presenting ER with complaints of swelling of the right side of her labia. Patient reports symptoms for the last 3 to 4 days. Swelling has been worsening and has increased pain with palpation and pressure and sitting. Denies any fevers or chills. Denies any discharge or any vaginal bleeding. No dysuria. No abdominal pain or flank pain. Previous symptoms. Allergies to antibiotics. Patient has not been seen for this complaint. Past Medical History:   Diagnosis Date    Anemia     secondary to lupus    Asthma     no inhaler use in past 2 to 3 years    Carditis     Chronic kidney disease     ESRD    Chronic pain     DDD (degenerative disc disease), lumbar     ESRD (end stage renal disease) (HCC)     GERD (gastroesophageal reflux disease)     Hemodialysis patient ([facility] [address] 2017    [address]  Tuesday,  Thursday,  and Saturday. 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  Poor historian 2018    With medications    Thromboembolus ([facility] [address] 2013    lungs    Transfusion history     Last Transfusion 2017  at [facility]       Past Surgical History:   Procedure Laterality Date    HX  SECTION  11/2006    x1    HX OTHER SURGICAL  9/16/15    INSERTION PD CATH;  Removed 2017    HX VASCULAR ACCESS Right 2017    Double-Lumen henry catheter upper chest    HX VASCULAR ACCESS Right 2018    right upper arm         Family History:   Problem Relation Age of Onset    Diabetes Father     Hypertension Father     Cancer Other         aunt with breast cancer    Diabetes Mother        Social History     Socioeconomic History    Marital status: SINGLE     Spouse name: Not on file    Number of children: Not on file    Years of education: Not on file    Highest education level: Not on file   Occupational History    Not on file   Social Needs    Financial resource strain: Not on file    Food insecurity     Worry: Not on file     Inability: Not on file    Transportation needs     Medical: Not on file     Non-medical: Not on file   Tobacco Use    Smoking status: Never Smoker    Smokeless tobacco: Never Used   Substance and Sexual Activity    Alcohol use: No    Drug use: Yes     Types: Prescription, OTC    Sexual activity: Not Currently   Lifestyle    Physical activity     Days per week: Not on file     Minutes per session: Not on file    Stress: Not on file   Relationships    Social connections     Talks on phone: Not on file     Gets together: Not on file     Attends Ep[employer]opalian service: Not on file     Active member of club or organization: Not on file     Attends meetings of clubs or organizations: Not on file     Relationship status: Not on file    Intimate partner violence     Fear of current or ex partner: Not on file     Emotionally abused: Not on file     Physically abused: Not on file     Forced sexual activity: Not on file   Other Topics Concern     Service No    Blood Transfusions No    Caffeine Concern No    Occupational Exposure No    Hobby Hazards No    Sleep Concern No    Stress Concern No    Weight Concern No    Special Diet No    Back Care Yes     Comment: low back pain    Exercise No    Bike Helmet No    Seat Belt Yes    Self-Exams No   Social History Narrative    Lives with parents and daughter. ALLERGIES: Compazine [prochlorperazine edisylate]    Review of Systems   Constitutional: Negative for fever. Gastrointestinal: Negative for abdominal pain, diarrhea, nausea and vomiting. Genitourinary: Positive for vaginal pain.  Negative for dyspareunia, dysuria, flank pain, frequency, vaginal bleeding and vaginal discharge. All other systems reviewed and are negative. Vitals:    10/21/20 1848 10/21/20 1849   BP:  (!) 131/94   Pulse:  97   Resp:  16   Temp:  100 °F (37.8 °C)   SpO2:  98%   Weight: 95.9 kg (211 lb 6.7 oz) 95.9 kg (211 lb 6.7 oz)   Height:  5' 5\" (1.651 m)            Physical Exam  Exam conducted with a chaperone present. Constitutional:       Appearance: Normal appearance. HENT:      Head: Normocephalic. Nose: Nose normal.      Mouth/Throat:      Pharynx: Oropharynx is clear. Eyes:      Conjunctiva/sclera: Conjunctivae normal.   Neck:      Musculoskeletal: Neck supple. Cardiovascular:      Rate and Rhythm: Normal rate. Pulmonary:      Effort: Pulmonary effort is normal. No respiratory distress. Abdominal:      General: Abdomen is flat. Bowel sounds are normal.      Tenderness: There is no abdominal tenderness. Hernia: There is no hernia in the left inguinal area or right inguinal area. Genitourinary:     Labia:         Right: Tenderness and lesion present. No rash or injury. Left: No tenderness, lesion or injury. Musculoskeletal: Normal range of motion. Lymphadenopathy:      Lower Body: No left inguinal adenopathy. Skin:     General: Skin is warm. Capillary Refill: Capillary refill takes less than 2 seconds. Neurological:      General: No focal deficit present. Mental Status: She is alert. MDM  Number of Diagnoses or Management Options  Bartholin's gland abscess:   Diagnosis management comments: Patient with Bartholin's gland cyst/abscess. After drainage significant purulent discharge. Caraballo catheter placed and started patient on antibiotics advised follow-up with her OB/GYN.          I&D Abcess Simple    Date/Time: 10/21/2020 6:58 PM  Performed by: Aniceto Kelley MD  Authorized by: Aniceto Kelley MD     Consent:     Consent obtained:  Verbal    Consent given by:  Patient    Risks discussed:  Bleeding, incomplete drainage, pain, infection and damage to other organs    Alternatives discussed:  Referral  Location:     Type:  Bartholin cyst    Size:  2x2    Location: right Bartholin's gland. Pre-procedure details:     Skin preparation:  Betadine  Anesthesia (see MAR for exact dosages): Anesthesia method:  Local infiltration    Local anesthetic:  Lidocaine 1% WITH epi  Procedure type:     Complexity:  Simple  Procedure details:     Incision types:  Stab incision    Scalpel blade:  11    Wound management:  Irrigated with saline    Drainage:  Purulent and bloody    Drainage amount: Moderate    Wound treatment:  Drain placed (Word catheter)    Packing materials:  None  Post-procedure details:     Patient tolerance of procedure:   Tolerated well, no immediate complications

## 2020-10-21 NOTE — ED TRIAGE NOTES
Triage Note: Patient complains of a painful abscess to the right side of her vagina x 3 to 4 days. Denies drainage.

## 2020-10-22 ENCOUNTER — HOSPITAL ENCOUNTER (EMERGENCY)
Age: 35
Discharge: HOME | End: 2020-10-22
Attending: EMERGENCY MEDICINE
Payer: MEDICARE

## 2020-10-22 ENCOUNTER — PATIENT OUTREACH (OUTPATIENT)
Dept: CASE MANAGEMENT | Age: 35
End: 2020-10-22

## 2020-10-22 VITALS
HEART RATE: 100 BPM | SYSTOLIC BLOOD PRESSURE: 143 MMHG | BODY MASS INDEX: 35.11 KG/M2 | DIASTOLIC BLOOD PRESSURE: 99 MMHG | OXYGEN SATURATION: 100 % | WEIGHT: 210.76 LBS | RESPIRATION RATE: 18 BRPM | HEIGHT: 65 IN | TEMPERATURE: 97.8 F

## 2020-10-22 DIAGNOSIS — Z99.2 DEPENDENCE ON HEMODIALYSIS (HCC): ICD-10-CM

## 2020-10-22 DIAGNOSIS — G89.18 OTHER ACUTE POSTPROCEDURAL PAIN: Primary | ICD-10-CM

## 2020-10-22 PROCEDURE — 99284 EMERGENCY DEPT VISIT MOD MDM: CPT

## 2020-10-22 PROCEDURE — 74011250637 HC RX REV CODE- 250/637: Performed by: EMERGENCY MEDICINE

## 2020-10-22 PROCEDURE — 74011000250 HC RX REV CODE- 250: Performed by: EMERGENCY MEDICINE

## 2020-10-22 RX ORDER — OXYCODONE HYDROCHLORIDE 5 MG/1
5 TABLET ORAL ONCE
Status: COMPLETED | OUTPATIENT
Start: 2020-10-22 | End: 2020-10-22

## 2020-10-22 RX ORDER — LIDOCAINE HYDROCHLORIDE 20 MG/ML
5 SOLUTION OROPHARYNGEAL
Status: COMPLETED | OUTPATIENT
Start: 2020-10-22 | End: 2020-10-22

## 2020-10-22 RX ORDER — OXYCODONE HYDROCHLORIDE 5 MG/1
5 TABLET ORAL
Qty: 2 TAB | Refills: 0 | Status: SHIPPED | OUTPATIENT
Start: 2020-10-22 | End: 2020-10-24

## 2020-10-22 RX ADMIN — LIDOCAINE HYDROCHLORIDE 5 ML: 20 SOLUTION ORAL; TOPICAL at 09:31

## 2020-10-22 RX ADMIN — OXYCODONE HYDROCHLORIDE 5 MG: 5 TABLET ORAL at 10:33

## 2020-10-22 NOTE — ED PROVIDER NOTES
76-year-old female end-stage renal disease,  hemodialysis, presenting to the ED for postprocedural pain. Patient was seen in ED last night for right-sided Bartholin cyst.  She underwent I&D with Word catheter placement by Dr. Ryann Alfonso. She was discharged on antibiotics. She reports taking Tylenol, ibuprofen and Aleve at home with no relief. She reports severe 10 out of 10 pain at the site of her abscess. She reports her Word catheter is still in place. She denies fevers, chills, nausea, vomiting. Past Medical History:   Diagnosis Date    Anemia     secondary to lupus    Asthma     no inhaler use in past 2 to 3 years    Carditis     Chronic kidney disease     ESRD    Chronic pain     DDD (degenerative disc disease), lumbar     ESRD (end stage renal disease) (HCC)     GERD (gastroesophageal reflux disease)     Hemodialysis patient ([facility] [address] 2017    [address]  Tuesday,  Thursday,  and Saturday.  Hypercholesterolemia     Hypertension     Intractable nausea and vomiting 10/21/2015    Long term (current) use of anticoagulants     Lupus (systemic lupus erythematosus) (HCC)     Malignant hypertension with chronic kidney disease stage V ([facility] [address]     Peritoneal dialysis status ([facility] [address] 10/2015    x 2 years Stopped 2017 due to infection and removed.  Poor historian 2018    With medications    Thromboembolus ([facility] [address] 2013    lungs    Transfusion history     Last Transfusion 2017  at [facility]       Past Surgical History:   Procedure Laterality Date    HX  SECTION  11/2006    x1    HX OTHER SURGICAL  9/16/15    INSERTION PD CATH;  Removed 2017    HX VASCULAR ACCESS Right 2017    Double-Lumen henry catheter upper chest    HX VASCULAR ACCESS Right 2018    right upper arm         Family History:   Problem Relation Age of Onset    Diabetes Father     Hypertension Father     Cancer Other         aunt with breast cancer    Diabetes Mother        Social History     Socioeconomic History    Marital status: SINGLE     Spouse name: Not on file    Number of children: Not on file    Years of education: Not on file    Highest education level: Not on file   Occupational History    Not on file   Social Needs    Financial resource strain: Not on file    Food insecurity     Worry: Not on file     Inability: Not on file    Transportation needs     Medical: Not on file     Non-medical: Not on file   Tobacco Use    Smoking status: Never Smoker    Smokeless tobacco: Never Used   Substance and Sexual Activity    Alcohol use: No    Drug use: Yes     Types: Prescription, OTC    Sexual activity: Not Currently   Lifestyle    Physical activity     Days per week: Not on file     Minutes per session: Not on file    Stress: Not on file   Relationships    Social connections     Talks on phone: Not on file     Gets together: Not on file     Attends Judaism service: Not on file     Active member of club or organization: Not on file     Attends meetings of clubs or organizations: Not on file     Relationship status: Not on file    Intimate partner violence     Fear of current or ex partner: Not on file     Emotionally abused: Not on file     Physically abused: Not on file     Forced sexual activity: Not on file   Other Topics Concern     Service No    Blood Transfusions No    Caffeine Concern No    Occupational Exposure No    Hobby Hazards No    Sleep Concern No    Stress Concern No    Weight Concern No    Special Diet No    Back Care Yes     Comment: low back pain    Exercise No    Bike Helmet No    Seat Belt Yes    Self-Exams No   Social History Narrative    Lives with parents and daughter. ALLERGIES: Compazine [prochlorperazine edisylate]    Review of Systems   Constitutional: Negative for chills and fever. HENT: Negative for congestion. Eyes: Negative for visual disturbance.    Respiratory: Negative for shortness of breath. Cardiovascular: Negative for chest pain. Gastrointestinal: Negative for abdominal pain, nausea and vomiting. Genitourinary: Negative for dysuria and hematuria. Musculoskeletal: Negative for back pain. Skin: Negative for rash. Allergic/Immunologic: Positive for immunocompromised state. Neurological: Negative for syncope, weakness and headaches. Psychiatric/Behavioral: Negative for confusion. Vitals:    10/22/20 0900 10/22/20 0902   BP: (!) 136/95 (!) 144/103   Pulse:  100   Resp:  18   Temp:  98.2 °F (36.8 °C)   SpO2: 100% 100%   Weight:  95.6 kg (210 lb 12.2 oz)   Height:  5' 5\" (1.651 m)            Physical Exam  Vitals signs reviewed. Constitutional:       General: She is not in acute distress. Appearance: She is well-developed. HENT:      Head: Normocephalic and atraumatic. Eyes:      General: No scleral icterus. Neck:      Trachea: No tracheal deviation. Cardiovascular:      Rate and Rhythm: Normal rate. Pulmonary:      Effort: Pulmonary effort is normal. No respiratory distress. Abdominal:      General: There is no distension. Genitourinary:      Skin:     General: Skin is warm and dry. Neurological:      Mental Status: She is alert and oriented to person, place, and time. MDM  Number of Diagnoses or Management Options  Diagnosis management comments: 71-year-old female 4 presenting for persistent pain after I&D of Bartholin cyst.  Incision site looks clean with Word catheter in place. No role for opioid pain medication given risks. Will apply topical viscous lidocaine in ED and if effective pursue prescription for same. We will have her continue taking Tylenol and ibuprofen at home. Procedures      10:31 AM  No improvement after application of viscous lidocaine. I have provided 1 dose of oxycodone here.   She understands she should take Tylenol and ibuprofen for management of her pain and continue to monitor the area for any signs of infection. PCP or OB follow-up.     Signed By: Stephanie Sanchez MD     October 22, 2020

## 2020-10-22 NOTE — DISCHARGE INSTRUCTIONS
Patient Education        Acute Pain After Surgery: Care Instructions  Your Care Instructions     It's common to have some pain after surgery. Pain doesn't mean that something is wrong or that the surgery didn't go well. But when the pain is severe, it's important to work with your doctor to manage it. It's also important to be aware of a few facts about pain and pain medicine. · You are the only person who knows what your pain feels like. So be sure to tell your doctor when you are in pain or when the pain changes. Then he or she will know how to adjust your medicines. · Pain is often easier to control right after it starts. So it may be better to take regular doses of pain medicine and not wait until the pain gets bad. · Medicine can help control pain. But this doesn't mean you'll have no pain. Medicine works to keep the pain at a level you can live with. With time, you will feel better. Follow-up care is a key part of your treatment and safety. Be sure to make and go to all appointments, and call your doctor if you are having problems. It's also a good idea to know your test results and keep a list of the medicines you take. How can you care for yourself at home? · Be safe with medicines. Read and follow all instructions on the label. ? If the doctor gave you a prescription medicine for pain, take it as prescribed. ? If you are not taking a prescription pain medicine, ask your doctor if you can take an over-the-counter medicine. · If you take an over-the-counter pain medicine, such as acetaminophen (Tylenol), ibuprofen (Advil, Motrin), or naproxen (Aleve), read and follow all instructions on the label. · Do not take two or more pain medicines at the same time unless the doctor told you to. · Do not drink alcohol while you are taking pain medicines. · Try to walk each day if your doctor recommends it. Start by walking a little more than you did the day before. Bit by bit, increase the amount you walk. Walking increases blood flow. It also helps prevent pneumonia and constipation. · To prevent constipation from opioid pain medicines:  ? Talk to your doctor about a laxative. ? Include fruits, vegetables, beans, and whole grains in your diet each day. These foods are high in fiber. ? Drink plenty of fluids, enough so that your urine is light yellow or clear like water. Drink water, fruit juice, or other drinks that do not contain caffeine or alcohol. If you have kidney, heart, or liver disease and have to limit fluids, talk with your doctor before you increase the amount of fluids you drink. ? Take a fiber supplement, such as Citrucel or Metamucil, every day if needed. Read and follow all instructions on the label. If you take pain medicine for more than a few days, talk to your doctor before you take fiber. When should you call for help? Call your doctor now or seek immediate medical care if:    · Your pain gets worse.     · Your pain is not controlled by medicine. Watch closely for changes in your health, and be sure to contact your doctor if you have any problems. Where can you learn more? Go to http://www.chen.com/  Enter H549 in the search box to learn more about \"Acute Pain After Surgery: Care Instructions. \"  Current as of: June 26, 2019               Content Version: 12.6  © 6247-8258 [employer]. Care instructions adapted under license by [employer] (which disclaims liability or warranty for this information). If you have questions about a medical condition or this instruction, always ask your healthcare professional. [place] [address] any warranty or liability for your use of this information.

## 2020-10-22 NOTE — ED NOTES
Discharge instructions reviewed with pt and copy given along with RX by this RN. Pt educated to follow up with PCP for wound recheck in 3 days. Pt verbalized understanding of all education and is ambulatory from ED in no sign of distress or discomfort.

## 2020-10-22 NOTE — ED TRIAGE NOTES
Pt was seen last night for a Bartholin's gland abscess and reports Alexsandra Jackson has tried ibuprofen and tylenol since midnight last night and this am and nothing is helping with the pain and it is unbearable. \"

## 2020-10-23 ENCOUNTER — HOSPITAL ENCOUNTER (EMERGENCY)
Age: 35
Discharge: HOME | End: 2020-10-23
Attending: EMERGENCY MEDICINE
Payer: MEDICARE

## 2020-10-23 VITALS
DIASTOLIC BLOOD PRESSURE: 72 MMHG | RESPIRATION RATE: 18 BRPM | BODY MASS INDEX: 34.78 KG/M2 | WEIGHT: 208.78 LBS | SYSTOLIC BLOOD PRESSURE: 108 MMHG | HEIGHT: 65 IN | OXYGEN SATURATION: 99 % | HEART RATE: 111 BPM | TEMPERATURE: 98.8 F

## 2020-10-23 DIAGNOSIS — N18.6 ESRD (END STAGE RENAL DISEASE) ON DIALYSIS (HCC): ICD-10-CM

## 2020-10-23 DIAGNOSIS — N75.1 BARTHOLIN'S GLAND ABSCESS: Primary | ICD-10-CM

## 2020-10-23 DIAGNOSIS — Z99.2 ESRD (END STAGE RENAL DISEASE) ON DIALYSIS (HCC): ICD-10-CM

## 2020-10-23 PROCEDURE — 99282 EMERGENCY DEPT VISIT SF MDM: CPT

## 2020-10-23 RX ORDER — OXYCODONE AND ACETAMINOPHEN 5; 325 MG/1; MG/1
1 TABLET ORAL
Qty: 11 TAB | Refills: 0 | Status: SHIPPED | OUTPATIENT
Start: 2020-10-23 | End: 2020-10-26

## 2020-10-23 NOTE — ED TRIAGE NOTES
Triage: pt requesting \"word catheter\" to be removed that was placed x2 days ago. Denies fever. +pain.

## 2020-10-23 NOTE — DISCHARGE INSTRUCTIONS
Patient Education        Bartholin Gland Cyst: Care Instructions  Your Care Instructions     The Bartholin glands are in a woman's vulva. This is the area around the vagina. The glands are normally about the size of a pea. They provide fluid to the vulvar area through a small opening. If the opening is blocked, the gland swells with fluid and forms a cyst. You can have a cyst for years with no symptoms. But if a cyst gets infected by bacteria, it can grow and become red and painful. This is called an abscess. Opening and draining the cyst usually cures the infection. You may have had a small tube (catheter) placed into the cyst or had minor surgery to let the cyst drain. The tube will usually be left in for at least 4 weeks. Your doctor may do a lab test to find out what kind of bacteria caused the infection. You may get antibiotics to kill the bacteria. You may have some drainage from the cyst for a few weeks. The gland should return to normal after the infection clears up. Follow-up care is a key part of your treatment and safety. Be sure to make and go to all appointments, and call your doctor if you are having problems. It's also a good idea to know your test results and keep a list of the medicines you take. How can you care for yourself at home? · If your doctor prescribed antibiotics, take them as directed. Do not stop taking them just because you feel better. You need to take the full course of antibiotics. · Sit in a few inches of warm water (sitz bath) 3 times a day and after bowel movements. The warm water helps the area heal and eases discomfort. · Take an over-the-counter pain medicine such as acetaminophen (Tylenol), ibuprofen (Advil, Motrin), or naproxen (Aleve). Read and follow all instructions on the label. · Do not take two or more pain medicines at the same time unless the doctor told you to. Many pain medicines have acetaminophen, which is Tylenol.  Too much acetaminophen (Tylenol) can be harmful. · Wear panty liners or pads if you have discharge from the draining cyst.  · If you are sexually active, avoid sex until:  ? You have finished the antibiotics. ? The area has healed. · If you had a catheter placed in the cyst to help it drain, follow your doctor's instructions for activities until the tube comes out. When should you call for help? Call your doctor now or seek immediate medical care if:    · You have symptoms of a new or worse infection, such as:  ? Increased pain, swelling, warmth, or redness. ? Red streaks leading from the area. ? Pus draining from the area. ? A fever. Watch closely for changes in your health, and be sure to contact your doctor if:    · The catheter falls out.     · You are not getting better as expected. Where can you learn more? Go to http://www.gray.com/  Enter H276 in the search box to learn more about \"Bartholin Gland Cyst: Care Instructions. \"  Current as of: November 8, 2019               Content Version: 12.6  © 5660-8228 [employer]. Care instructions adapted under license by [employer] (which disclaims liability or warranty for this information). If you have questions about a medical condition or this instruction, always ask your healthcare professional. [address] any warranty or liability for your use of this information.

## 2020-10-27 NOTE — TELEPHONE ENCOUNTER
Diallo Zhong just want to know if we can follow up patient she got discharge Sunday  Per Dr Shayy Cordova will follow up with patient 26.4

## 2020-11-12 ENCOUNTER — APPOINTMENT (OUTPATIENT)
Dept: GENERAL RADIOLOGY | Age: 35
DRG: 602 | End: 2020-11-12
Attending: EMERGENCY MEDICINE
Payer: MEDICARE

## 2020-11-12 ENCOUNTER — HOSPITAL ENCOUNTER (INPATIENT)
Age: 35
LOS: 2 days | Discharge: HOME | DRG: 602 | End: 2020-11-14
Attending: EMERGENCY MEDICINE | Admitting: FAMILY MEDICINE
Payer: MEDICARE

## 2020-11-12 ENCOUNTER — APPOINTMENT (OUTPATIENT)
Dept: VASCULAR SURGERY | Age: 35
DRG: 602 | End: 2020-11-12
Attending: FAMILY MEDICINE
Payer: MEDICARE

## 2020-11-12 DIAGNOSIS — L03.119 CELLULITIS OF HAND: ICD-10-CM
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
## 2020-11-12 PROBLEM — L02.91 ABSCESS: Status: ACTIVE | Noted: 2020-11-12

## 2020-11-12 LAB
ANION GAP SERPL CALC-SCNC: 11 MMOL/L (ref 5–15)
BASOPHILS # BLD: 0 K/UL (ref 0–0.1)
BASOPHILS # BLD: 0 K/UL (ref 0–0.1)
BASOPHILS NFR BLD: 0 % (ref 0–1)
BASOPHILS NFR BLD: 0 % (ref 0–1)
BUN SERPL-MCNC: 29 MG/DL (ref 6–20)
BUN/CREAT SERPL: 4 (ref 12–20)
CALCIUM SERPL-MCNC: 8.6 MG/DL (ref 8.5–10.1)
CHLORIDE SERPL-SCNC: 96 MMOL/L (ref 97–108)
CO2 SERPL-SCNC: 29 MMOL/L (ref 21–32)
COMMENT, HOLDF: NORMAL
CREAT SERPL-MCNC: 7.87 MG/DL (ref 0.55–1.02)
CRP SERPL-MCNC: 1.98 MG/DL
DIFFERENTIAL METHOD BLD: ABNORMAL
DIFFERENTIAL METHOD BLD: ABNORMAL
EOSINOPHIL # BLD: 0 K/UL (ref 0–0.4)
EOSINOPHIL # BLD: 0.1 K/UL (ref 0–0.4)
EOSINOPHIL NFR BLD: 1 % (ref 0–7)
EOSINOPHIL NFR BLD: 2 % (ref 0–7)
ERYTHROCYTE [DISTWIDTH] IN BLOOD BY AUTOMATED COUNT: 14.5 % (ref 11.5–14.5)
ERYTHROCYTE [DISTWIDTH] IN BLOOD BY AUTOMATED COUNT: 14.6 % (ref 11.5–14.5)
GLUCOSE SERPL-MCNC: 77 MG/DL (ref 65–100)
HCT VFR BLD AUTO: 22.9 % (ref 35–47)
HCT VFR BLD AUTO: 24.9 % (ref 35–47)
HGB BLD-MCNC: 7.5 G/DL (ref 11.5–16)
HGB BLD-MCNC: 7.7 G/DL (ref 11.5–16)
IMM GRANULOCYTES # BLD AUTO: 0 K/UL (ref 0–0.04)
IMM GRANULOCYTES # BLD AUTO: 0 K/UL (ref 0–0.04)
IMM GRANULOCYTES NFR BLD AUTO: 0 % (ref 0–0.5)
IMM GRANULOCYTES NFR BLD AUTO: 0 % (ref 0–0.5)
IRON SATN MFR SERPL: 15 % (ref 20–50)
IRON SERPL-MCNC: 26 UG/DL (ref 35–150)
LYMPHOCYTES # BLD: 0.5 K/UL (ref 0.8–3.5)
LYMPHOCYTES # BLD: 0.5 K/UL (ref 0.8–3.5)
LYMPHOCYTES NFR BLD: 11 % (ref 12–49)
LYMPHOCYTES NFR BLD: 17 % (ref 12–49)
MCH RBC QN AUTO: 30.2 PG (ref 26–34)
MCH RBC QN AUTO: 31.3 PG (ref 26–34)
MCHC RBC AUTO-ENTMCNC: 30.9 G/DL (ref 30–36.5)
MCHC RBC AUTO-ENTMCNC: 32.8 G/DL (ref 30–36.5)
MCV RBC AUTO: 95.4 FL (ref 80–99)
MCV RBC AUTO: 97.6 FL (ref 80–99)
MONOCYTES # BLD: 0.2 K/UL (ref 0–1)
MONOCYTES # BLD: 0.2 K/UL (ref 0–1)
MONOCYTES NFR BLD: 5 % (ref 5–13)
MONOCYTES NFR BLD: 6 % (ref 5–13)
NEUTS SEG # BLD: 2.4 K/UL (ref 1.8–8)
NEUTS SEG # BLD: 4 K/UL (ref 1.8–8)
NEUTS SEG NFR BLD: 75 % (ref 32–75)
NEUTS SEG NFR BLD: 83 % (ref 32–75)
NRBC # BLD: 0 K/UL (ref 0–0.01)
NRBC # BLD: 0 K/UL (ref 0–0.01)
NRBC BLD-RTO: 0 PER 100 WBC
NRBC BLD-RTO: 0 PER 100 WBC
PLATELET # BLD AUTO: 185 K/UL (ref 150–400)
PLATELET # BLD AUTO: 195 K/UL (ref 150–400)
PMV BLD AUTO: 9.4 FL (ref 8.9–12.9)
PMV BLD AUTO: 9.5 FL (ref 8.9–12.9)
POTASSIUM SERPL-SCNC: 4.9 MMOL/L (ref 3.5–5.1)
RBC # BLD AUTO: 2.4 M/UL (ref 3.8–5.2)
RBC # BLD AUTO: 2.55 M/UL (ref 3.8–5.2)
RBC MORPH BLD: ABNORMAL
RBC MORPH BLD: ABNORMAL
SAMPLES BEING HELD,HOLD: NORMAL
SODIUM SERPL-SCNC: 136 MMOL/L (ref 136–145)
TIBC SERPL-MCNC: 178 UG/DL (ref 250–450)
WBC # BLD AUTO: 3.2 K/UL (ref 3.6–11)
WBC # BLD AUTO: 4.7 K/UL (ref 3.6–11)

## 2020-11-12 PROCEDURE — 65270000032 HC RM SEMIPRIVATE

## 2020-11-12 PROCEDURE — 73130 X-RAY EXAM OF HAND: CPT

## 2020-11-12 PROCEDURE — 74011000250 HC RX REV CODE- 250: Performed by: FAMILY MEDICINE

## 2020-11-12 PROCEDURE — 74011250637 HC RX REV CODE- 250/637: Performed by: PHYSICIAN ASSISTANT

## 2020-11-12 PROCEDURE — 74011250636 HC RX REV CODE- 250/636: Performed by: EMERGENCY MEDICINE

## 2020-11-12 PROCEDURE — 85025 COMPLETE CBC W/AUTO DIFF WBC: CPT

## 2020-11-12 PROCEDURE — 96375 TX/PRO/DX INJ NEW DRUG ADDON: CPT

## 2020-11-12 PROCEDURE — 87040 BLOOD CULTURE FOR BACTERIA: CPT

## 2020-11-12 PROCEDURE — 99283 EMERGENCY DEPT VISIT LOW MDM: CPT

## 2020-11-12 PROCEDURE — 96365 THER/PROPH/DIAG IV INF INIT: CPT

## 2020-11-12 PROCEDURE — 93971 EXTREMITY STUDY: CPT

## 2020-11-12 PROCEDURE — C9113 INJ PANTOPRAZOLE SODIUM, VIA: HCPCS | Performed by: FAMILY MEDICINE

## 2020-11-12 PROCEDURE — 83540 ASSAY OF IRON: CPT

## 2020-11-12 PROCEDURE — 96376 TX/PRO/DX INJ SAME DRUG ADON: CPT

## 2020-11-12 PROCEDURE — 36415 COLL VENOUS BLD VENIPUNCTURE: CPT

## 2020-11-12 PROCEDURE — 74011250636 HC RX REV CODE- 250/636: Performed by: FAMILY MEDICINE

## 2020-11-12 PROCEDURE — 83605 ASSAY OF LACTIC ACID: CPT

## 2020-11-12 PROCEDURE — 80053 COMPREHEN METABOLIC PANEL: CPT

## 2020-11-12 PROCEDURE — 74011000258 HC RX REV CODE- 258: Performed by: EMERGENCY MEDICINE

## 2020-11-12 PROCEDURE — 86140 C-REACTIVE PROTEIN: CPT

## 2020-11-12 RX ORDER — SODIUM CHLORIDE 0.9 % (FLUSH) 0.9 %
5-40 SYRINGE (ML) INJECTION EVERY 8 HOURS
Status: DISCONTINUED | OUTPATIENT
Start: 2020-11-12 | End: 2020-11-14 | Stop reason: HOSPADM

## 2020-11-12 RX ORDER — SODIUM CHLORIDE 9 MG/ML
75 INJECTION, SOLUTION INTRAVENOUS CONTINUOUS
Status: DISCONTINUED | OUTPATIENT
Start: 2020-11-12 | End: 2020-11-13

## 2020-11-12 RX ORDER — PREDNISONE 10 MG/1
10 TABLET ORAL DAILY
Status: DISCONTINUED | OUTPATIENT
Start: 2020-11-13 | End: 2020-11-14 | Stop reason: HOSPADM

## 2020-11-12 RX ORDER — OXYCODONE HYDROCHLORIDE 5 MG/1
5 TABLET ORAL
Status: DISCONTINUED | OUTPATIENT
Start: 2020-11-12 | End: 2020-11-14 | Stop reason: HOSPADM

## 2020-11-12 RX ORDER — CARVEDILOL 12.5 MG/1
25 TABLET ORAL 2 TIMES DAILY WITH MEALS
Status: DISCONTINUED | OUTPATIENT
Start: 2020-11-13 | End: 2020-11-14 | Stop reason: HOSPADM

## 2020-11-12 RX ORDER — SODIUM CHLORIDE 0.9 % (FLUSH) 0.9 %
5-40 SYRINGE (ML) INJECTION AS NEEDED
Status: DISCONTINUED | OUTPATIENT
Start: 2020-11-12 | End: 2020-11-14 | Stop reason: HOSPADM

## 2020-11-12 RX ORDER — ACETAMINOPHEN 325 MG/1
650 TABLET ORAL
Status: DISCONTINUED | OUTPATIENT
Start: 2020-11-12 | End: 2020-11-14 | Stop reason: HOSPADM

## 2020-11-12 RX ORDER — AZATHIOPRINE 50 MG/1
50 TABLET ORAL
Status: DISCONTINUED | OUTPATIENT
Start: 2020-11-13 | End: 2020-11-14 | Stop reason: HOSPADM

## 2020-11-12 RX ORDER — MORPHINE SULFATE 2 MG/ML
2 INJECTION, SOLUTION INTRAMUSCULAR; INTRAVENOUS
Status: COMPLETED | OUTPATIENT
Start: 2020-11-12 | End: 2020-11-12

## 2020-11-12 RX ORDER — GEMFIBROZIL 600 MG/1
300 TABLET, FILM COATED ORAL DAILY
Status: DISCONTINUED | OUTPATIENT
Start: 2020-11-13 | End: 2020-11-14 | Stop reason: HOSPADM

## 2020-11-12 RX ORDER — PANTOPRAZOLE SODIUM 40 MG/1
40 TABLET, DELAYED RELEASE ORAL
Status: DISCONTINUED | OUTPATIENT
Start: 2020-11-13 | End: 2020-11-14 | Stop reason: HOSPADM

## 2020-11-12 RX ORDER — HYDROMORPHONE HYDROCHLORIDE 1 MG/ML
0.5 INJECTION, SOLUTION INTRAMUSCULAR; INTRAVENOUS; SUBCUTANEOUS
Status: DISCONTINUED | OUTPATIENT
Start: 2020-11-12 | End: 2020-11-14 | Stop reason: HOSPADM

## 2020-11-12 RX ORDER — VANCOMYCIN 2 GRAM/500 ML IN 0.9 % SODIUM CHLORIDE INTRAVENOUS
2000 ONCE
Status: COMPLETED | OUTPATIENT
Start: 2020-11-12 | End: 2020-11-13

## 2020-11-12 RX ORDER — PANTOPRAZOLE SODIUM 40 MG/1
40 TABLET, DELAYED RELEASE ORAL
Status: DISCONTINUED | OUTPATIENT
Start: 2020-11-12 | End: 2020-11-12

## 2020-11-12 RX ADMIN — MORPHINE SULFATE 2 MG: 2 INJECTION, SOLUTION INTRAMUSCULAR; INTRAVENOUS at 15:16

## 2020-11-12 RX ADMIN — AMPICILLIN SODIUM AND SULBACTAM SODIUM 3 G: 2; 1 INJECTION, POWDER, FOR SOLUTION INTRAMUSCULAR; INTRAVENOUS at 12:14

## 2020-11-12 RX ADMIN — OXYCODONE HYDROCHLORIDE 5 MG: 5 TABLET ORAL at 18:54

## 2020-11-12 RX ADMIN — MORPHINE SULFATE 2 MG: 2 INJECTION, SOLUTION INTRAMUSCULAR; INTRAVENOUS at 13:05

## 2020-11-12 RX ADMIN — VANCOMYCIN HYDROCHLORIDE 2000 MG: 100 INJECTION, POWDER, LYOPHILIZED, FOR SOLUTION INTRAVENOUS at 20:42

## 2020-11-12 RX ADMIN — SODIUM CHLORIDE 75 ML/HR: 900 INJECTION, SOLUTION INTRAVENOUS at 20:42

## 2020-11-12 RX ADMIN — SODIUM CHLORIDE 40 MG: 9 INJECTION, SOLUTION INTRAMUSCULAR; INTRAVENOUS; SUBCUTANEOUS at 22:58

## 2020-11-12 RX ADMIN — Medication 10 ML: at 22:58

## 2020-11-12 NOTE — ED PROVIDER NOTES
HPI     61-year-old female with a history of lupus on Plaquenil, end-stage renal disease on hemodialysis last dialyzed yesterday here with left hand pain and swelling after dog bite. Patient had a dog bite about 10:30 PM last night. Patient was bit by a pit bull. Patient believes that the shots are up-to-date. Patient believes that her tetanus is up-to-date. Since the bite, her left hand has become warm and swollen as well as moderate pain worse with movement. Patient currently takes Plaquenil for her lupus. She was last dialyzed yesterday. No other complaints at this time. Past Medical History:   Diagnosis Date    Anemia     secondary to lupus    Asthma     no inhaler use in past 2 to 3 years    Carditis     Chronic kidney disease     ESRD    Chronic pain     DDD (degenerative disc disease), lumbar     ESRD (end stage renal disease) (HCC)     GERD (gastroesophageal reflux disease)     Hemodialysis patient ([facility] 2017    [address]  Tuesday,  Thursday,  and Saturday.  Hypercholesterolemia     Hypertension     Intractable nausea and vomiting 10/21/2015    Long term (current) use of anticoagulants     Lupus (systemic lupus erythematosus) (HCC)     Malignant hypertension with chronic kidney disease stage V ([facility]     Peritoneal dialysis status ([facility] 10/2015    x 2 years Stopped 2017 due to infection and removed.  Poor historian 2018    With medications    Thromboembolus ([facility] [facility] 2013    lungs    Transfusion history     Last Transfusion 2017  at [facility]       Past Surgical History:   Procedure Laterality Date    HX  SECTION  11/2006    x1    HX OTHER SURGICAL  9/16/15    INSERTION PD CATH;  Removed 2017    HX VASCULAR ACCESS Right 2017    Double-Lumen henry catheter upper chest    HX VASCULAR ACCESS Right 2018    right upper arm         Family History:   Problem Relation Age of Onset    Diabetes Father     Hypertension Father    Ashlee Jose Cancer Other         aunt with breast cancer    Diabetes Mother        Social History     Socioeconomic History    Marital status: SINGLE     Spouse name: Not on file    Number of children: Not on file    Years of education: Not on file    Highest education level: Not on file   Occupational History    Not on file   Social Needs    Financial resource strain: Not on file    Food insecurity     Worry: Not on file     Inability: Not on file    Transportation needs     Medical: Not on file     Non-medical: Not on file   Tobacco Use    Smoking status: Never Smoker    Smokeless tobacco: Never Used   Substance and Sexual Activity    Alcohol use: No    Drug use: Yes     Types: Prescription, OTC    Sexual activity: Not Currently   Lifestyle    Physical activity     Days per week: Not on file     Minutes per session: Not on file    Stress: Not on file   Relationships    Social connections     Talks on phone: Not on file     Gets together: Not on file     Attends Sabianist service: Not on file     Active member of club or organization: Not on file     Attends meetings of clubs or organizations: Not on file     Relationship status: Not on file    Intimate partner violence     Fear of current or ex partner: Not on file     Emotionally abused: Not on file     Physically abused: Not on file     Forced sexual activity: Not on file   Other Topics Concern     Service No    Blood Transfusions No    Caffeine Concern No    Occupational Exposure No    Hobby Hazards No    Sleep Concern No    Stress Concern No    Weight Concern No    Special Diet No    Back Care Yes     Comment: low back pain    Exercise No    Bike Helmet No    Seat Belt Yes    Self-Exams No   Social History Narrative    Lives with parents and daughter. ALLERGIES: Compazine [prochlorperazine edisylate]    Review of Systems   Constitutional: Negative for chills and fever.    Musculoskeletal: Positive for arthralgias and joint swelling. Skin: Positive for wound. All other systems reviewed and are negative. Vitals:    11/12/20 1054 11/12/20 1055   BP: (!) 149/110 (!) 134/101   Pulse: 92    Resp: 16    Temp: 98.7 °F (37.1 °C)    SpO2: 100%    Weight: 97 kg (213 lb 13.5 oz)    Height: 5' 5\" (1.651 m)             Physical Exam  Vitals signs and nursing note reviewed. Constitutional:       Appearance: She is well-developed. HENT:      Head: Normocephalic and atraumatic. Nose: No congestion or rhinorrhea. Mouth/Throat:      Mouth: Mucous membranes are moist.      Pharynx: No oropharyngeal exudate. Eyes:      General: No scleral icterus. Right eye: No discharge. Left eye: No discharge. Conjunctiva/sclera: Conjunctivae normal.   Neck:      Musculoskeletal: Normal range of motion and neck supple. Cardiovascular:      Rate and Rhythm: Normal rate and regular rhythm. Heart sounds: Normal heart sounds. No murmur. No friction rub. No gallop. Pulmonary:      Effort: Pulmonary effort is normal. No respiratory distress. Breath sounds: Normal breath sounds. No wheezing or rales. Abdominal:      General: Bowel sounds are normal. There is no distension. Palpations: Abdomen is soft. Tenderness: There is no abdominal tenderness. There is no guarding. Musculoskeletal:        Hands:    Lymphadenopathy:      Cervical: No cervical adenopathy. Skin:     General: Skin is warm and dry. Coloration: Skin is not pale. Findings: No rash. Neurological:      Mental Status: She is alert and oriented to person, place, and time. Cranial Nerves: No cranial nerve deficit. Coordination: Coordination normal.      verbal permission by patient for picture:          MDM       Left-handed female with left hand pain, swelling and warmth after dog bite yesterday. Patient does have immunosuppression with lupus.   Differential diagnosis includes fracture, infection, contusion and others. Check x-ray, labs. Will give Unasyn. Patient reports that her tetanus is up-to-date. Procedures      Perfect Serve Consult for Admission  12:45 PM    ED Room Number: SER12/12  Patient Name and age:  Claribel Gong 30 y.o.  female  Working Diagnosis:   1. Dog bite, initial encounter    2. Cellulitis of hand        COVID-19 Suspicion:  no  Sepsis present:  no  Reassessment needed: no  Code Status:  Full Code  Readmission: no  Isolation Requirements:  no  Recommended Level of Care:  med/surg  Department:[facility] - [phone]  Other:  Significant swelling and pain with warmth to dominant hand from dog bite. Ortho consulted. H/o lupus. esrd on HD. Recent Results (from the past 24 hour(s))   CBC WITH AUTOMATED DIFF    Collection Time: 11/12/20 12:01 PM   Result Value Ref Range    WBC 3.2 (L) 3.6 - 11.0 K/uL    RBC 2.55 (L) 3.80 - 5.20 M/uL    HGB 7.7 (L) 11.5 - 16.0 g/dL    HCT 24.9 (L) 35.0 - 47.0 %    MCV 97.6 80.0 - 99.0 FL    MCH 30.2 26.0 - 34.0 PG    MCHC 30.9 30.0 - 36.5 g/dL    RDW 14.5 11.5 - 14.5 %    PLATELET 437 807 - 292 K/uL    MPV 9.4 8.9 - 12.9 FL    NRBC 0.0 0  WBC    ABSOLUTE NRBC 0.00 0.00 - 0.01 K/uL    NEUTROPHILS 75 32 - 75 %    LYMPHOCYTES 17 12 - 49 %    MONOCYTES 6 5 - 13 %    EOSINOPHILS 2 0 - 7 %    BASOPHILS 0 0 - 1 %    IMMATURE GRANULOCYTES 0 0.0 - 0.5 %    ABS. NEUTROPHILS 2.4 1.8 - 8.0 K/UL    ABS. LYMPHOCYTES 0.5 (L) 0.8 - 3.5 K/UL    ABS. MONOCYTES 0.2 0.0 - 1.0 K/UL    ABS. EOSINOPHILS 0.1 0.0 - 0.4 K/UL    ABS. BASOPHILS 0.0 0.0 - 0.1 K/UL    ABS. IMM.  GRANS. 0.0 0.00 - 0.04 K/UL    DF AUTOMATED      RBC COMMENTS NORMOCYTIC, NORMOCHROMIC     METABOLIC PANEL, BASIC    Collection Time: 11/12/20 12:01 PM   Result Value Ref Range    Sodium 136 136 - 145 mmol/L    Potassium 4.9 3.5 - 5.1 mmol/L    Chloride 96 (L) 97 - 108 mmol/L    CO2 29 21 - 32 mmol/L    Anion gap 11 5 - 15 mmol/L    Glucose 77 65 - 100 mg/dL    BUN 29 (H) 6 - 20 MG/DL Creatinine 7.87 (H) 0.55 - 1.02 MG/DL    BUN/Creatinine ratio 4 (L) 12 - 20      GFR est AA 7 (L) >60 ml/min/1.73m2    GFR est non-AA 6 (L) >60 ml/min/1.73m2    Calcium 8.6 8.5 - 10.1 MG/DL   C REACTIVE PROTEIN, QT    Collection Time: 11/12/20 12:01 PM   Result Value Ref Range    C-Reactive protein 1.98 (H) <0.60 mg/dL   SAMPLES BEING HELD    Collection Time: 11/12/20 12:01 PM   Result Value Ref Range    SAMPLES BEING HELD 1BLUE, 1RED     COMMENT        Add-on orders for these samples will be processed based on acceptable specimen integrity and analyte stability, which may vary by analyte. Xr Hand Lt Min 3 V    Result Date: 11/12/2020  EXAM: XR HAND LT MIN 3 V INDICATION: dog bite with left hand pain and swelling. COMPARISON: None. FINDINGS: Three views of the left hand demonstrate no acute fracture, dislocation or other acute osseous or articular abnormality. Chronic defect of the distal aspect of the second distal phalanx is noted. The soft tissues are within normal limits. IMPRESSION: No acute abnormality.

## 2020-11-12 NOTE — ROUTINE PROCESS
TRANSFER - OUT REPORT:    Verbal report given to Kalee Pierre(name) on Angella Jimenez  being transferred to [facility](unit) for routine progression of care       Report consisted of patients Situation, Background, Assessment and   Recommendations(SBAR). Information from the following report(s) SBAR and ED Summary was reviewed with the receiving nurse. Lines:   Peripheral IV 11/12/20 Anterior;Left;Proximal Forearm (Active)   Site Assessment Clean, dry, & intact 11/12/20 1158   Phlebitis Assessment 0 11/12/20 1158   Infiltration Assessment 0 11/12/20 1158   Dressing Status Clean, dry, & intact 11/12/20 1158   Dressing Type Transparent 11/12/20 1158   Hub Color/Line Status Pink 11/12/20 1158        Opportunity for questions and clarification was provided.

## 2020-11-12 NOTE — CONSULTS
ORTHO CONSULT NOTE    Subjective:     Date of Consultation:  November 12, 2020      Chaim Shin is a 30 y.o. female who is being seen for left hand injury. She was bitten by a family members dog last night around 10:30pm. Pain and swelling has gotten progressively worse since then. She was seen at [facility] ER this morning. Xrays were negative for fracture. She denies fever/chills. There was concern for abscess development due to immune compromise from Lupus and ESRD. She was transferred to [facility] for admission and IV antibiotics. Orthopedic surgery is consulted to evaluate the left hand and consideration of surgical I&D.      Patient Active Problem List    Diagnosis Date Noted    Abscess 11/12/2020    Pneumonia 03/14/2020    Seizure ([address] 10/18/2019    HCAP (healthcare-associated pneumonia) 07/16/2019    Anemia due to blood loss 04/21/2019    Vaginal bleeding 04/18/2019    Hyperkalemia 03/04/2019    ESRD on hemodialysis ([address] 08/27/2018    SOB (shortness of breath) 08/15/2018    Rib pain on right side 07/02/2018    Troponin level elevated 06/17/2018    Intra-abdominal abscess ([address] 05/12/2018    Sepsis ([address] 04/29/2018    Generalized abdominal pain 04/04/2018    Other constipation 04/04/2018    Other ascites 04/04/2018    Peritonitis ([address] 03/11/2018    History of pulmonary embolism 12/08/2017    Hypomagnesemia 10/30/2017    Hypocalcemia 10/30/2017    Hypokalemia 10/27/2017    Hypertension 10/14/2017    Chronic bilateral low back pain without sciatica 05/26/2017    Anemia of renal disease 02/21/2017    Dependence on peritoneal dialysis ([address] 02/21/2017    ACP (advance care planning) 02/21/2017    Malignant hypertension 07/11/2016    Encounter for monitoring opioid maintenance therapy 11/03/2015    Lupus nephritis ([address] 03/10/2012    Lupus 02/27/2012     Family History   Problem Relation Age of Onset    Diabetes Father     Hypertension Father     Cancer Other         aunt with breast cancer    Diabetes Mother       Social History     Tobacco Use    Smoking status: Never Smoker    Smokeless tobacco: Never Used   Substance Use Topics    Alcohol use: No     Past Medical History:   Diagnosis Date    Anemia     secondary to lupus    Asthma     no inhaler use in past 2 to 3 years    Carditis     Chronic kidney disease     ESRD    Chronic pain     DDD (degenerative disc disease), lumbar     ESRD (end stage renal disease) (HCC)     GERD (gastroesophageal reflux disease)     Hemodialysis patient ([facility] [address] 2017    [address]  Tuesday,  Thursday,  and Saturday.  Hypercholesterolemia     Hypertension     Intractable nausea and vomiting 10/21/2015    Long term (current) use of anticoagulants     Lupus (systemic lupus erythematosus) (HCC)     Malignant hypertension with chronic kidney disease stage V ([facility] [facility]     Peritoneal dialysis status ([facility] [address] 10/2015    x 2 years Stopped 2017 due to infection and removed.  Poor historian 2018    With medications    Thromboembolus ([facility] [address]     lungs    Transfusion history     Last Transfusion 2017  at [facility]      Past Surgical History:   Procedure Laterality Date    HX  SECTION  11/2006    x1    HX OTHER SURGICAL  9/16/15    INSERTION PD CATH; Removed 2017    HX VASCULAR ACCESS Right 2017    Double-Lumen henry catheter upper chest    HX VASCULAR ACCESS Right 2018    right upper arm      Prior to Admission medications    Medication Sig Start Date End Date Taking? Authorizing Provider   amitriptyline (ELAVIL) 50 mg tablet Take 3 Tabs by mouth nightly. Antidepressant to help insomnia. 10/12/20  Yes Pepe Ramires MD   pantoprazole (PROTONIX) 40 mg tablet Take 1 Tab by mouth ACB/HS. 19  Yes Arcelia Brown MD   ferric citrate (AURYXIA) 210 mg iron tablet Take 420 mg by mouth three (3) times daily (with meals).    Yes Provider, Historical   carvedilol (COREG) 25 mg tablet Take 1 Tab by mouth two (2) times daily (with meals). 18  Yes Quinton Braun MD   predniSONE (DELTASONE) 5 mg tablet Take 2 Tabs by mouth daily. 18  Yes Wallace Galaviz MD   gemfibrozil (LOPID) 600 mg tablet Take 300 mg by mouth daily. 11/3/17  Yes Provider, Historical   azaTHIOprine (IMURAN) 50 mg tablet Take 50 mg by mouth daily (after breakfast). 11/3/17  Yes Provider, Historical   ondansetron (Zofran ODT) 4 mg disintegrating tablet Take 1 Tab by mouth every eight (8) hours as needed for Nausea. 3/28/20   Viv Class, DO   apixaban (ELIQUIS) 5 mg tablet Take 5 mg by mouth every twelve (12) hours. Provider, Historical     Current Facility-Administered Medications   Medication Dose Route Frequency    oxyCODONE IR (ROXICODONE) tablet 5 mg  5 mg Oral Q4H PRN      Allergies   Allergen Reactions    Compazine [Prochlorperazine Edisylate] Nausea and Vomiting and Palpitations     \"hot and lightheaded\"        Review of Systems:  A comprehensive review of systems was negative except for that written in the HPI.     Objective:     Patient Vitals for the past 8 hrs:   BP Temp Pulse Resp SpO2 Height Weight   20 1749 (!) 140/95 99.3 °F (37.4 °C) 86 16 99 % -- --   20 1635 (!) 140/97 98.7 °F (37.1 °C) 75 16 100 % -- --   20 1506 (!) 138/102 99.1 °F (37.3 °C) 81 16 98 % -- --   20 1055 (!) 134/101 -- -- -- -- -- --   20 1054 (!) 149/110 98.7 °F (37.1 °C) 92 16 100 % 5' 5\" (1.651 m) 97 kg (213 lb 13.5 oz)     Temp (24hrs), Av °F (37.2 °C), Min:98.7 °F (37.1 °C), Max:99.3 °F (37.4 °C)      EXAM:   General: 33yo female, pleasant/cooperative, complaining of left hand pain, no distress  CNS: alert/oriented, normal mood   Skin: multiple bite marks noted in the area overlying the 2nd and 3rd metacarpals, no bleeding or active drainage, erythema/warmth/swelling noted on the dorsum of the left hand in the bite area  Extremities: left hand appears as above, able to fully extend the fingers and make fist but with obvious discomfort  Vascular:capillary refill <2 secs in all fingers left hand  Neurologic: motor/sensation intact in radial/median, and ulnar nerve distributions    Imaging Review:  XR HAND LT MIN 3 V 11/12/2020 11:45  INDICATION: dog bite with left hand pain and swelling. COMPARISON: None. FINDINGS: Three views of the left hand demonstrate no acute fracture,  dislocation or other acute osseous or articular abnormality. Chronic defect of  the distal aspect of the second distal phalanx is noted. The soft tissues are  within normal limits. IMPRESSION: No acute abnormality. Labs:   Recent Results (from the past 24 hour(s))   CBC WITH AUTOMATED DIFF    Collection Time: 11/12/20 12:01 PM   Result Value Ref Range    WBC 3.2 (L) 3.6 - 11.0 K/uL    RBC 2.55 (L) 3.80 - 5.20 M/uL    HGB 7.7 (L) 11.5 - 16.0 g/dL    HCT 24.9 (L) 35.0 - 47.0 %    MCV 97.6 80.0 - 99.0 FL    MCH 30.2 26.0 - 34.0 PG    MCHC 30.9 30.0 - 36.5 g/dL    RDW 14.5 11.5 - 14.5 %    PLATELET 664 557 - 316 K/uL    MPV 9.4 8.9 - 12.9 FL    NRBC 0.0 0  WBC    ABSOLUTE NRBC 0.00 0.00 - 0.01 K/uL    NEUTROPHILS 75 32 - 75 %    LYMPHOCYTES 17 12 - 49 %    MONOCYTES 6 5 - 13 %    EOSINOPHILS 2 0 - 7 %    BASOPHILS 0 0 - 1 %    IMMATURE GRANULOCYTES 0 0.0 - 0.5 %    ABS. NEUTROPHILS 2.4 1.8 - 8.0 K/UL    ABS. LYMPHOCYTES 0.5 (L) 0.8 - 3.5 K/UL    ABS. MONOCYTES 0.2 0.0 - 1.0 K/UL    ABS. EOSINOPHILS 0.1 0.0 - 0.4 K/UL    ABS. BASOPHILS 0.0 0.0 - 0.1 K/UL    ABS. IMM.  GRANS. 0.0 0.00 - 0.04 K/UL    DF AUTOMATED      RBC COMMENTS NORMOCYTIC, NORMOCHROMIC     METABOLIC PANEL, BASIC    Collection Time: 11/12/20 12:01 PM   Result Value Ref Range    Sodium 136 136 - 145 mmol/L    Potassium 4.9 3.5 - 5.1 mmol/L    Chloride 96 (L) 97 - 108 mmol/L    CO2 29 21 - 32 mmol/L    Anion gap 11 5 - 15 mmol/L    Glucose 77 65 - 100 mg/dL    BUN 29 (H) 6 - 20 MG/DL    Creatinine 7.87 (H) 0.55 - 1.02 MG/DL    BUN/Creatinine ratio 4 (L) 12 - 20      GFR est AA 7 (L) >60 ml/min/1.73m2    GFR est non-AA 6 (L) >60 ml/min/1.73m2    Calcium 8.6 8.5 - 10.1 MG/DL   C REACTIVE PROTEIN, QT    Collection Time: 11/12/20 12:01 PM   Result Value Ref Range    C-Reactive protein 1.98 (H) <0.60 mg/dL   SAMPLES BEING HELD    Collection Time: 11/12/20 12:01 PM   Result Value Ref Range    SAMPLES BEING HELD 1BLUE, 1RED     COMMENT        Add-on orders for these samples will be processed based on acceptable specimen integrity and analyte stability, which may vary by analyte.          Impression:     Patient Active Problem List    Diagnosis Date Noted    Abscess 11/12/2020    Pneumonia 03/14/2020    Seizure ([facility] 10/18/2019    HCAP (healthcare-associated pneumonia) 07/16/2019    Anemia due to blood loss 04/21/2019    Vaginal bleeding 04/18/2019    Hyperkalemia 03/04/2019    ESRD on hemodialysis ([address] 08/27/2018    SOB (shortness of breath) 08/15/2018    Rib pain on right side 07/02/2018    Troponin level elevated 06/17/2018    Intra-abdominal abscess ([address] 05/12/2018    Sepsis ([address] 04/29/2018    Generalized abdominal pain 04/04/2018    Other constipation 04/04/2018    Other ascites 04/04/2018    Peritonitis ([address] 03/11/2018    History of pulmonary embolism 12/08/2017    Hypomagnesemia 10/30/2017    Hypocalcemia 10/30/2017    Hypokalemia 10/27/2017    Hypertension 10/14/2017    Chronic bilateral low back pain without sciatica 05/26/2017    Anemia of renal disease 02/21/2017    Dependence on peritoneal dialysis ([address] 02/21/2017    ACP (advance care planning) 02/21/2017    Malignant hypertension 07/11/2016    Encounter for monitoring opioid maintenance therapy 11/03/2015    Lupus nephritis ([facility] 03/10/2012    Lupus 02/27/2012     Active Problems:    Abscess (11/12/2020)        Plan:   No plan for surgical intervention at this time  Although I can't rule out abscess, seems more consistent with hematoma at this time  Volar splint placed  Strict ice and elevation  Short course of NSAIDs if ok with medicine  If fails to improve with antibiotics, immobilization, consider MRI  Dr. Gustabo Barragan aware and agree with above plan.       SINAN Duke

## 2020-11-12 NOTE — ED TRIAGE NOTES
Pt states that she was bit by a dog last night on the left hand, left hand is swollen. Pt states that it was her cousins dog, pt states that the dog is up to date on his shots, [facility] is where this happen.

## 2020-11-12 NOTE — ED NOTES
Pts car keys were given to the [facility] security for their family to  at a later time today

## 2020-11-13 LAB
ALBUMIN SERPL-MCNC: 3.1 G/DL (ref 3.5–5)
ALBUMIN/GLOB SERPL: 0.8 {RATIO} (ref 1.1–2.2)
ALP SERPL-CCNC: 77 U/L (ref 45–117)
ALT SERPL-CCNC: 8 U/L (ref 12–78)
ANION GAP SERPL CALC-SCNC: 8 MMOL/L (ref 5–15)
AST SERPL-CCNC: 11 U/L (ref 15–37)
BILIRUB SERPL-MCNC: 0.3 MG/DL (ref 0.2–1)
BUN SERPL-MCNC: 35 MG/DL (ref 6–20)
BUN/CREAT SERPL: 4 (ref 12–20)
CALCIUM SERPL-MCNC: 8 MG/DL (ref 8.5–10.1)
CHLORIDE SERPL-SCNC: 97 MMOL/L (ref 97–108)
CO2 SERPL-SCNC: 29 MMOL/L (ref 21–32)
CREAT SERPL-MCNC: 8.97 MG/DL (ref 0.55–1.02)
GLOBULIN SER CALC-MCNC: 3.9 G/DL (ref 2–4)
GLUCOSE SERPL-MCNC: 110 MG/DL (ref 65–100)
LACTATE SERPL-SCNC: 1.2 MMOL/L (ref 0.4–2)
POTASSIUM SERPL-SCNC: 3.9 MMOL/L (ref 3.5–5.1)
PROT SERPL-MCNC: 7 G/DL (ref 6.4–8.2)
SODIUM SERPL-SCNC: 134 MMOL/L (ref 136–145)

## 2020-11-13 PROCEDURE — 74011250636 HC RX REV CODE- 250/636: Performed by: INTERNAL MEDICINE

## 2020-11-13 PROCEDURE — 74011250637 HC RX REV CODE- 250/637: Performed by: NURSE PRACTITIONER

## 2020-11-13 PROCEDURE — 74011250637 HC RX REV CODE- 250/637: Performed by: PHYSICIAN ASSISTANT

## 2020-11-13 PROCEDURE — 74011636637 HC RX REV CODE- 636/637: Performed by: FAMILY MEDICINE

## 2020-11-13 PROCEDURE — 74011250636 HC RX REV CODE- 250/636: Performed by: NURSE PRACTITIONER

## 2020-11-13 PROCEDURE — 74011000258 HC RX REV CODE- 258: Performed by: FAMILY MEDICINE

## 2020-11-13 PROCEDURE — 74011250636 HC RX REV CODE- 250/636: Performed by: FAMILY MEDICINE

## 2020-11-13 PROCEDURE — 74011250637 HC RX REV CODE- 250/637: Performed by: FAMILY MEDICINE

## 2020-11-13 PROCEDURE — 65270000032 HC RM SEMIPRIVATE

## 2020-11-13 RX ADMIN — GEMFIBROZIL 300 MG: 600 TABLET ORAL at 08:19

## 2020-11-13 RX ADMIN — PREDNISONE 10 MG: 10 TABLET ORAL at 08:19

## 2020-11-13 RX ADMIN — Medication 10 ML: at 12:46

## 2020-11-13 RX ADMIN — OXYCODONE HYDROCHLORIDE 5 MG: 5 TABLET ORAL at 11:15

## 2020-11-13 RX ADMIN — FERRIC CITRATE 420 MG: 210 TABLET, COATED ORAL at 09:37

## 2020-11-13 RX ADMIN — CARVEDILOL 25 MG: 12.5 TABLET, FILM COATED ORAL at 08:19

## 2020-11-13 RX ADMIN — PANTOPRAZOLE SODIUM 40 MG: 40 TABLET, DELAYED RELEASE ORAL at 21:26

## 2020-11-13 RX ADMIN — Medication 10 ML: at 21:29

## 2020-11-13 RX ADMIN — CEFEPIME HYDROCHLORIDE 1 G: 1 INJECTION, POWDER, FOR SOLUTION INTRAMUSCULAR; INTRAVENOUS at 21:29

## 2020-11-13 RX ADMIN — FERRIC CITRATE 420 MG: 210 TABLET, COATED ORAL at 11:15

## 2020-11-13 RX ADMIN — OXYCODONE HYDROCHLORIDE 5 MG: 5 TABLET ORAL at 17:21

## 2020-11-13 RX ADMIN — EPOETIN ALFA-EPBX 10000 UNITS: 10000 INJECTION, SOLUTION INTRAVENOUS; SUBCUTANEOUS at 21:26

## 2020-11-13 RX ADMIN — PANTOPRAZOLE SODIUM 40 MG: 40 TABLET, DELAYED RELEASE ORAL at 06:12

## 2020-11-13 RX ADMIN — AZATHIOPRINE 50 MG: 50 TABLET ORAL at 08:19

## 2020-11-13 RX ADMIN — FERRIC CITRATE 420 MG: 210 TABLET, COATED ORAL at 16:05

## 2020-11-13 RX ADMIN — OXYCODONE HYDROCHLORIDE 5 MG: 5 TABLET ORAL at 04:10

## 2020-11-13 RX ADMIN — CARVEDILOL 25 MG: 12.5 TABLET, FILM COATED ORAL at 16:05

## 2020-11-13 RX ADMIN — CEFEPIME HYDROCHLORIDE 1 G: 1 INJECTION, POWDER, FOR SOLUTION INTRAMUSCULAR; INTRAVENOUS at 01:20

## 2020-11-13 RX ADMIN — HYDROMORPHONE HYDROCHLORIDE 0.5 MG: 1 INJECTION, SOLUTION INTRAMUSCULAR; INTRAVENOUS; SUBCUTANEOUS at 12:46

## 2020-11-13 NOTE — PROGRESS NOTES
Problem: Falls - Risk of  Goal: *Absence of Falls  Description: Document Мария Shinedebbie Fall Risk and appropriate interventions in the flowsheet.   Outcome: Progressing Towards Goal  Note: Fall Risk Interventions:            Medication Interventions: Evaluate medications/consider consulting pharmacy                   Problem: Patient Education: Go to Patient Education Activity  Goal: Patient/Family Education  Outcome: Progressing Towards Goal

## 2020-11-13 NOTE — PROGRESS NOTES
Day #1 of Cefepime   Indication:  SSTI  Current regimen:  2 gm q8h  Abx regimen: Vancomycin + Cefepime   Recent Labs     20  1201   WBC 3.2*   CREA 7.87*   BUN 29*     Est CrCl: ESRD on HD MWF    Temp (24hrs), Av °F (37.2 °C), Min:98.7 °F (37.1 °C), Max:99.3 °F (37.4 °C)    Cultures: none    Plan: Change to 1 gm q24h per renal adjustment protocol    Jesus Varghese, PharmD

## 2020-11-13 NOTE — PROGRESS NOTES
Medicare pt has received, reviewed, and signed 1st IM letter informing them of their right to appeal the discharge. Signed copy has been placed on pt bedside chart.

## 2020-11-13 NOTE — PROGRESS NOTES
ORTHO PROGRESS NOTE      SUBJECTIVE:  Celestina Manuel feels her left hand pain is somewhat improved. Able to move fingers. Denies streaking, numbness, and fever. OBJECTIVE:  Patient Vitals for the past 24 hrs:   Temp Pulse BP   11/13/20 0744 98.5 °F (36.9 °C) 96 (!) 133/90   11/13/20 0216 98.4 °F (36.9 °C) 95 113/79   11/12/20 2012 98.2 °F (36.8 °C) 90 135/87   11/12/20 1749 99.3 °F (37.4 °C) 86 (!) 140/95   11/12/20 1635 98.7 °F (37.1 °C) 75 (!) 140/97   11/12/20 1506 99.1 °F (37.3 °C) 81 (!) 138/102       Alert, no distress. Lying in bed. No family present. Respirations unlabored. Dressing CDI. Upon removal, puncture dorsal hand index MCP joint and over dorsal middle metacarpal shaft. No obvious fluctuance/induration. She does have TTP and edema. Palm no puncture and NTTP. Moves wrist OK. Moves thumb, ring, and pinky OK. OK flexion index and middle but extension somewhat limited actively. I can passively stretch extension index/middle without pain. CR brisk digits. No streaking. Recent Labs     11/12/20  2312   HGB 7.5*   HCT 22.9*      BUN 35*   CREA 8.97*   GFRAA 6*   GFRNA 5*     WBC 4.7    ASSESSMENT:  Dog bite with hematoma dorsal left hand. ESRD on HD (fistula right arm). Lupus    PLAN:  I re-wrapped splint and stressed elevation. Cont IV abx. Steroids started this am.  D/W Dr. Jasen Anderson. Will monitor for now and consider advanced imaging if not improving over next couple of days.     SINAN Joiner  Orthopedic Trauma Service  [address]

## 2020-11-13 NOTE — CONSULTS
[address]    Name:  Boo Krishnamurthy  MR#:  [id number]  :  1985  ACCOUNT #:  [de-identified]  DATE OF SERVICE:  2020    REASON FOR CONSULTATION:  Seen for end-stage renal disease. Thanks for the consult. HISTORY OF PRESENT ILLNESS:  We had the pleasure of seeing the patient. She sees us for end-stage renal disease at [facility] Monday, Wednesday, Friday. Last dialysis was done on Wednesday. She did well, went home and she had a dog bite in her left hand, unprovoked, and she was admitted here for further management with that. She had an AV fistula in her right upper extremity, doing okay, was not exposed to the dog bite. PAST MEDICAL HISTORY:  Includes  1. End-stage renal disease, on hemodialysis Monday, Wednesday, Friday at the [facility]. 2.  Hypertension. 3.  Failed PD related to peritonitis. 4.  Recurrent blood transfusions. 5.  Multiple vascular access surgeries. 6.  History of SLE. SOCIAL HISTORY:  Reviewed. FAMILY HISTORY:  Reviewed. MEDICATIONS AS INPATIENT:  Include vancomycin, prednisone, Protonix, gemfibrozil, Maxipime, Imuran 50 once a day, and Coreg. ALLERGIES:  COMPAZINE. PHYSICAL EXAMINATION:  GENERAL:  Not in distress. VITAL SIGNS:  Blood pressure is 133/90, satting 93%. NECK:  JVD is negative. EXTREMITIES:  Checked AV fistula, right upper extremity, looks okay. Positive thrill. Left hand is covered. Trace edema. LABORATORY DATA:  As follow, hemoglobin 7.5, WBC 4.7, platelet 284. Potassium 3.9 last night, BUN 35, creatinine 8.9, calcium is 8, albumin is 3.1. IMPRESSION:  1. End stage renal disease, on hemodialysis Monday, Wednesday, and Friday. Last dialysis on Wednesday, for dialysis today. 2.  Arteriovenous fistula. 3.  History of systemic lupus erythematosus. 4.  Dog bite. 5.  Anemia. RECOMMENDATIONS:  As follow;  1.  Resume Epogen. 2.  Dialysis today.   3.  Iron studies. 4.  On antibiotic. 5.  We will follow.       Chad Severe, MD [id number]  D:  11/13/2020 12:25  T:  11/13/2020 17:49  JOB #:  [id number]

## 2020-11-13 NOTE — CONSULTS
Seen and examined  Thanks for the consult  A/P:  ESRD MWF at [facility]  AVF  Anemia  SLE  Dog bite    AB  DEANDRA,iron studies  HD today  Will follow

## 2020-11-13 NOTE — PROGRESS NOTES
Pharmacist Note - Vancomycin Dosing (Hemodialysis Patient)    Consult provided for this 30 y.o. female for indication of SSTI, Dog bite. This patient is also on the following antibiotic regimen(s): Cefepime + Vancomycin     Lab Results   Component Value Date/Time    Creatinine 7.87 (H) 2020 12:01 PM    Creatinine (POC) 4.4 (H) 10/18/2019 11:28 AM    WBC 3.2 (L) 2020 12:01 PM    BUN 29 (H) 2020 12:01 PM    BUN (POC) 16 10/18/2019 11:28 AM   Temp (24hrs), Av °F (37.2 °C), Min:98.7 °F (37.1 °C), Max:99.3 °F (37.4 °C)      Estimated CrCl:  ESRD on HD (Monday/Wednesday/Friday)  Cultures: Blood - pending     For this HD patient, will initiate therapy with a loading dose of Vancomycin 2000 mg, to be followed with a dose of 750 mg after each HD session. Dose will be adjusted to maintain a pre-HD trough of approximately 15 - 20 mcg/mL for therapeutic goal of 10 - 15 mcg/mL as approximately 35% of drug will be removed by HD filtration. Pharmacy to follow patient daily and order levels / make dose adjustments as appropriate.     Yeni Burrell PharmD

## 2020-11-13 NOTE — PROGRESS NOTES
Care Management Interventions  PCP Verified by CM: Yes(Jay Casas NP)  Last Visit to PCP: 11/12/20  Palliative Care Criteria Met (RRAT>21 & CHF Dx)?: No  Mode of Transport at Discharge: (fATHER )  Vashti Garnett: No  Discharge Durable Medical Equipment: No  Physical Therapy Consult: No  Occupational Therapy Consult: No  Speech Therapy Consult: No  Current Support Network: Relative's Home(LIVES WITH PARENTS AND DAUGHTER )  The Patient and/or Patient Representative was Provided with a Choice of Provider and Agrees with the Discharge Plan?: No  Freedom of Choice List was Provided with Basic Dialogue that Supports the Patient's Individualized Plan of Care/Goals, Treatment Preferences and Shares the Quality Data Associated with the Providers?: No   Resource Information Provided?: No  Discharge Location  Discharge Placement: Home   CRM met with patient who states she went to her PCP and was told by NP to come to the [place] ED. Patient was transferred to [facility]. Patient sustained a dog bite in her dominant MCP jiont and has a splint in place. She has been started on IVAB. Patient has a history of Lupus along with ESRD. She goes to [facility] dialysis unit on Mon-Wed- fri at 6:45am.  She either drives herself or her dad provides transportation. This  notes that patient is now on IVAB. Patient does not have an ACP and declined any further information regarding it. Patient uses  the  [facility] in [place]. Patient does not use any adaptive equiptment at home. Care management will follow for transitions of care needs.

## 2020-11-13 NOTE — PROGRESS NOTES
[facility] [facility]  Hospitalist Group                                                                                          Hospitalist Progress Note  Lord Estuardo NP  Answering service: [phone] from in house phone        Date of Service:  2020  NAME:  Fabiola Perez  :  1985  MRN:  [id number]      Admission Summary: This is a 30 y.o. female with PMH of Lupus, HTN, HLD and ESRD on HD MWF. Patient presents with a dog bite , a pit bull and as far as she knows the dog's immunization is up-to-date. Patient reports that since the dog bite her hand has progressively swollen and increased in pain, worst this am causing her to come to ED for treatment, concerned for an abscess and patient was requested to be admitted under the hospitalist service. Patient denies any other complaints or problems. The patient denies any Headache, blurry vision, sore throat, trouble swallowing, trouble with speech, chest pain, SOB, cough, fever, chills, N/V/D, abd pain, urinary symptoms, constipation, recent travels, sick contacts, focal or generalized neurological symptoms,  falls, injuries, rashes, contact with COVID-19 diagnosed patients, hematemesis, melena, hemoptysis, hematuria, rashes, denies starting any new medications and denies any other concerns or problems besides as mentioned above. Interval history / Subjective: Follow-up for Lupus HTN, HLD and ESRD on HD MWF, Left hand Cellulitis/Dog Bite.   -Patient seen and examined, no c/o's. Assessment & Plan:     Left hand cellulitis: 2/2 dog bite, possible abscess  -telemetry bed  -IVF- hold for now and monitor (ESRD)  -IV ABT  -appreciate Ortho input: Volar splint, keep elevated, ice, consider MRI if no improvement. -pain control  -venous duplex: WNL  -XR left hand: No acute abnormality.     ESRD on HD MWF:   -nephrology consult     Acute on Chronic Anemia: 2/2 ESRD and Lupus. Continue home iron.   -iron panel  -stool for occult blood  -monitor H&H and transfuse as needed to keep Hgb >7.0     HTN: Continue home medications and monitor. Lupus: continue home medications and monitor, reassess as needed. HLD: continue Lopid. Morbid Obesity: BMI: 35.59. Encouraged increasing activity and decreasing caloric intake. DVTppx: SCDs  Gippx: Protonix  Code Status: Full Code  Diet: Cardiac  Activity: OOB to chair TID and PRN with assistance  Discharge: Home 24-48hrs       Hospital Problems  Date Reviewed: 7/31/2019          Codes Class Noted POA    Abscess ICD-10-CM: L02.91  ICD-9-CM: 682.9  11/12/2020 Unknown                Review of Systems:   A comprehensive review of systems was negative except for that written in the HPI. Vital Signs:    Last 24hrs VS reviewed since prior progress note. Most recent are:  Visit Vitals  BP (!) 133/90 (BP 1 Location: Left arm, BP Patient Position: At rest)   Pulse 96   Temp 98.5 °F (36.9 °C)   Resp 16   Ht 5' 5\" (1.651 m)   Wt 97 kg (213 lb 13.5 oz)   SpO2 93%   BMI 35.59 kg/m²       No intake or output data in the 24 hours ending 11/13/20 0830     Physical Examination:     I had a face to face encounter with this patient and independently examined them on 11/13/2020 as outlined below:    Constitutional:  No acute distress, cooperative, pleasant    ENT:  Oral mucosa moist.    Resp:  CTA bilaterally. No wheezing/rhonchi/rales. No accessory muscle use. CV:  Regular rhythm, normal rate, no murmurs, gallops, rubs. /GI:  Soft, non distended, non tender, no guarding, BS present. Musculoskeletal:  No edema, warm, 2+ pulses throughout. Neurologic:  Moves all extremities. AAOx3, CN II-XII reviewed. Skin:  Good turgor, LUE: bite marks noted from wrist to fingers, multiple, erythema, warmth and edema noted, erythema extends to left forearm, tender to touch. Psych:  Good insight, Not anxious nor agitated.                Data Review:    Review and/or order of clinical lab test      Labs: Recent Labs     11/12/20 2312 11/12/20  1201   WBC 4.7 3.2*   HGB 7.5* 7.7*   HCT 22.9* 24.9*    195     Recent Labs     11/12/20 2312 11/12/20  1201   * 136   K 3.9 4.9   CL 97 96*   CO2 29 29   BUN 35* 29*   CREA 8.97* 7.87*   * 77   CA 8.0* 8.6     Recent Labs     11/12/20  2312   ALT 8*   AP 77   TBILI 0.3   TP 7.0   ALB 3.1*   GLOB 3.9     No results for input(s): INR, PTP, APTT, INREXT in the last 72 hours. Recent Labs     11/12/20 2312   TIBC 178*   PSAT 15*      Lab Results   Component Value Date/Time    Folate 13.7 06/17/2018 03:00 PM      No results for input(s): PH, PCO2, PO2 in the last 72 hours. No results for input(s): CPK, CKNDX, TROIQ in the last 72 hours.     No lab exists for component: CPKMB  Lab Results   Component Value Date/Time    Cholesterol, total 140 09/11/2018 06:17 AM    HDL Cholesterol 43 09/11/2018 06:17 AM    LDL, calculated 60.4 09/11/2018 06:17 AM    Triglyceride 183 (H) 09/11/2018 06:17 AM    CHOL/HDL Ratio 3.3 09/11/2018 06:17 AM     Lab Results   Component Value Date/Time    Glucose (POC) 85 10/18/2019 11:28 AM    Glucose (POC) 85 10/18/2019 11:04 AM    Glucose (POC) 90 04/19/2019 06:30 AM    Glucose (POC) 84 03/05/2019 08:11 AM    Glucose (POC) 104 (H) 08/16/2018 11:06 AM    Glucose (POC) 164 (H) 07/22/2018 11:43 AM     Lab Results   Component Value Date/Time    Color YELLOW/STRAW 03/31/2019 06:07 PM    Appearance CLEAR 03/31/2019 06:07 PM    Specific gravity 1.015 03/31/2019 06:07 PM    Specific gravity 1.017 08/12/2016 09:06 PM    pH (UA) 8.5 (H) 03/31/2019 06:07 PM    Protein 100 (A) 03/31/2019 06:07 PM    Glucose NEGATIVE  03/31/2019 06:07 PM    Ketone NEGATIVE  03/31/2019 06:07 PM    Bilirubin NEGATIVE  03/31/2019 06:07 PM    Urobilinogen 0.2 03/31/2019 06:07 PM    Nitrites NEGATIVE  03/31/2019 06:07 PM    Leukocyte Esterase NEGATIVE  03/31/2019 06:07 PM    Epithelial cells MODERATE (A) 03/31/2019 06:07 PM    Bacteria NEGATIVE  03/31/2019 06:07 PM    WBC 0-4 03/31/2019 06:07 PM    RBC 0-5 03/31/2019 06:07 PM         Medications Reviewed:     Current Facility-Administered Medications   Medication Dose Route Frequency    oxyCODONE IR (ROXICODONE) tablet 5 mg  5 mg Oral Q4H PRN    azaTHIOprine (IMURAN) tablet 50 mg  50 mg Oral DAILY AFTER BREAKFAST    carvediloL (COREG) tablet 25 mg  25 mg Oral BID WITH MEALS    gemfibroziL (LOPID) tablet 300 mg  300 mg Oral DAILY    predniSONE (DELTASONE) tablet 10 mg  10 mg Oral DAILY    sodium chloride (NS) flush 5-40 mL  5-40 mL IntraVENous Q8H    sodium chloride (NS) flush 5-40 mL  5-40 mL IntraVENous PRN    0.9% sodium chloride infusion  75 mL/hr IntraVENous CONTINUOUS    acetaminophen (TYLENOL) tablet 650 mg  650 mg Oral Q4H PRN    cefepime (MAXIPIME) 1 g in 0.9% sodium chloride (MBP/ADV) 50 mL MBP  1 g IntraVENous Q24H    Vancomycin - Pharmacy Dosing    Other Rx Dosing/Monitoring    HYDROmorphone (PF) (DILAUDID) injection 0.5 mg  0.5 mg IntraVENous Q6H PRN    pantoprazole (PROTONIX) tablet 40 mg  40 mg Oral ACB/HS     ______________________________________________________________________  EXPECTED LENGTH OF STAY: - - -  ACTUAL LENGTH OF STAY:          1                 Shimon Wong NP

## 2020-11-13 NOTE — H&P
History & Physical    Primary Care Provider: Vaughn Beal NP  Source of Information: Patient     History of Presenting Illness:   Laxmi Martinez is a 30 y.o. female who presents with dog bite    History is primary obtained from the patient    Patient reports that she has history of end-stage renal disease, is on hemodialysis, does dialysis Monday Wednesday Friday. Patient reports that she had a dog bite yesterday at around 10:30 PM.  Patient reports that she was bit by a pit bull. Patient reports that as far she knows the dog's immunization is up-to-date. Patient reports that since the dog bite her hand is getting more swollen it is hurting more and she is having trouble closing her fingers. Patient reports that the pain got more severe this morning got concerned and decided to come to the hospital.  Patient reports that she has history of lupus and takes Plaquenil for the same. Patient came to the ER there were concern for an abscess and patient was requested to be admitted under the hospitalist service. Patient denies any other complaints or problems. .     The patient denies any Headache, blurry vision, sore throat, trouble swallowing, trouble with speech, chest pain, SOB, cough, fever, chills, N/V/D, abd pain, urinary symptoms, constipation, recent travels, sick contacts, focal or generalized neurological symptoms,  falls, injuries, rashes, contact with COVID-19 diagnosed patients, hematemesis, melena, hemoptysis, hematuria, rashes, denies starting any new medications and denies any other concerns or problems besides as mentioned above. Review of Systems:  A comprehensive review of systems was negative except for that written in the History of Present Illness.      Past Medical History:   Diagnosis Date    Anemia     secondary to lupus    Asthma     no inhaler use in past 2 to 3 years    Carditis     Chronic kidney disease     ESRD    Chronic pain     DDD (degenerative disc disease), lumbar     [facility]) [facility]     GERD (gastroesophageal reflux disease)     Hemodialysis patient ([facility] 2017    [address]  Tuesday,  Thursday,  and Saturday.  Hypercholesterolemia     Hypertension     Intractable nausea and vomiting 10/21/2015    Long term (current) use of anticoagulants     Lupus (systemic lupus erythematosus) (HCC)     Malignant hypertension with chronic kidney disease stage V ([facility]     Peritoneal dialysis status ([facility] 10/2015    x 2 years Stopped 2017 due to infection and removed.  Poor historian 2018    With medications    Thromboembolus ([facility] 2013    lungs    Transfusion history     Last Transfusion 2017  at [facility]      Past Surgical History:   Procedure Laterality Date    HX  SECTION  11/2006    x1    HX OTHER SURGICAL  9/16/15    INSERTION PD CATH; Removed 2017    HX VASCULAR ACCESS Right 2017    Double-Lumen henry catheter upper chest    HX VASCULAR ACCESS Right 2018    right upper arm     Prior to Admission medications    Medication Sig Start Date End Date Taking? Authorizing Provider   amitriptyline (ELAVIL) 50 mg tablet Take 3 Tabs by mouth nightly. Antidepressant to help insomnia. 10/12/20  Yes Haroon Calvin MD   pantoprazole (PROTONIX) 40 mg tablet Take 1 Tab by mouth ACB/HS. 19  Yes Luigi Jackson MD   ferric citrate (AURYXIA) 210 mg iron tablet Take 420 mg by mouth three (3) times daily (with meals). Yes Provider, Historical   carvedilol (COREG) 25 mg tablet Take 1 Tab by mouth two (2) times daily (with meals). 18  Yes Jeremi Remy MD   predniSONE (DELTASONE) 5 mg tablet Take 2 Tabs by mouth daily. 18  Yes Carlos Self MD   gemfibrozil (LOPID) 600 mg tablet Take 300 mg by mouth daily. 11/3/17  Yes Provider, Historical   azaTHIOprine (IMURAN) 50 mg tablet Take 50 mg by mouth daily (after breakfast).  11/3/17  Yes Provider, Historical   ondansetron (Zofran ODT) 4 mg disintegrating tablet Take 1 Tab by mouth every eight (8) hours as needed for Nausea. 3/28/20   Casper Alejandre DO   apixaban (ELIQUIS) 5 mg tablet Take 5 mg by mouth every twelve (12) hours. Provider, Historical     Allergies   Allergen Reactions    Compazine [Prochlorperazine Edisylate] Nausea and Vomiting and Palpitations     \"hot and lightheaded\"      Family History   Problem Relation Age of Onset    Diabetes Father     Hypertension Father     Cancer Other         aunt with breast cancer    Diabetes Mother         SOCIAL HISTORY:  Patient resides:  Independently x   Assisted Living    SNF    With family care       Smoking history:   None    Former x   Chronic      Alcohol history:   None    Social x   Chronic      Ambulates:   Independently x   w/cane    w/walker    w/wc    CODE STATUS:  DNR    Full    Other      Objective:     Physical Exam:     Visit Vitals  BP (!) 140/95 (BP 1 Location: Left arm, BP Patient Position: At rest)   Pulse 86   Temp 99.3 °F (37.4 °C)   Resp 16   Ht 5' 5\" (1.651 m) Comment: Simultaneous filing. User may not have seen previous data.    Wt 97 kg (213 lb 13.5 oz)   SpO2 99%   BMI 35.59 kg/m²      O2 Device: Room air    General : alert x 3, awake, no acute distress, resting in bed, pleasant female, appears to be stated age  [de-identified]: PEERL, EOMI, moist mucus membrane, TM clear  Neck: supple, no JVD, no meningeal signs  Chest: Clear to auscultation bilaterally   CVS: S1 S2 heard, Capillary refill less than 2 seconds  Abd: soft/ Non tender, non distended, BS physiological, multiple bites noted around left wrist and hand around the second and third metacarpals, extending warmth with swelling and redness up to the distal one third of the forearm  Ext: no clubbing, no cyanosis, no edema,   Neuro/Psych: pleasant mood and affect, CN 2-12 grossly intact, sensory grossly within normal limit, Strength 5/5 in all extremities, DTR 1+ x 4  Skin: warm            Data Review:     Recent Days:  Recent Labs     11/12/20  1201   WBC 3.2*   HGB 7.7*   HCT 24.9*        Recent Labs     11/12/20  1201      K 4.9   CL 96*   CO2 29   GLU 77   BUN 29*   CREA 7.87*   CA 8.6     No results for input(s): PH, PCO2, PO2, HCO3, FIO2 in the last 72 hours. 24 Hour Results:  Recent Results (from the past 24 hour(s))   CBC WITH AUTOMATED DIFF    Collection Time: 11/12/20 12:01 PM   Result Value Ref Range    WBC 3.2 (L) 3.6 - 11.0 K/uL    RBC 2.55 (L) 3.80 - 5.20 M/uL    HGB 7.7 (L) 11.5 - 16.0 g/dL    HCT 24.9 (L) 35.0 - 47.0 %    MCV 97.6 80.0 - 99.0 FL    MCH 30.2 26.0 - 34.0 PG    MCHC 30.9 30.0 - 36.5 g/dL    RDW 14.5 11.5 - 14.5 %    PLATELET 207 787 - 359 K/uL    MPV 9.4 8.9 - 12.9 FL    NRBC 0.0 0  WBC    ABSOLUTE NRBC 0.00 0.00 - 0.01 K/uL    NEUTROPHILS 75 32 - 75 %    LYMPHOCYTES 17 12 - 49 %    MONOCYTES 6 5 - 13 %    EOSINOPHILS 2 0 - 7 %    BASOPHILS 0 0 - 1 %    IMMATURE GRANULOCYTES 0 0.0 - 0.5 %    ABS. NEUTROPHILS 2.4 1.8 - 8.0 K/UL    ABS. LYMPHOCYTES 0.5 (L) 0.8 - 3.5 K/UL    ABS. MONOCYTES 0.2 0.0 - 1.0 K/UL    ABS. EOSINOPHILS 0.1 0.0 - 0.4 K/UL    ABS. BASOPHILS 0.0 0.0 - 0.1 K/UL    ABS. IMM.  GRANS. 0.0 0.00 - 0.04 K/UL    DF AUTOMATED      RBC COMMENTS NORMOCYTIC, NORMOCHROMIC     METABOLIC PANEL, BASIC    Collection Time: 11/12/20 12:01 PM   Result Value Ref Range    Sodium 136 136 - 145 mmol/L    Potassium 4.9 3.5 - 5.1 mmol/L    Chloride 96 (L) 97 - 108 mmol/L    CO2 29 21 - 32 mmol/L    Anion gap 11 5 - 15 mmol/L    Glucose 77 65 - 100 mg/dL    BUN 29 (H) 6 - 20 MG/DL    Creatinine 7.87 (H) 0.55 - 1.02 MG/DL    BUN/Creatinine ratio 4 (L) 12 - 20      GFR est AA 7 (L) >60 ml/min/1.73m2    GFR est non-AA 6 (L) >60 ml/min/1.73m2    Calcium 8.6 8.5 - 10.1 MG/DL   C REACTIVE PROTEIN, QT    Collection Time: 11/12/20 12:01 PM   Result Value Ref Range    C-Reactive protein 1.98 (H) <0.60 mg/dL   SAMPLES BEING HELD    Collection Time: 11/12/20 12:01 PM   Result Value Ref Range    SAMPLES BEING HELD 1BLUE, 1RED     COMMENT        Add-on orders for these samples will be processed based on acceptable specimen integrity and analyte stability, which may vary by analyte. Imaging:   Xr Hand Lt Min 3 V    Result Date: 11/12/2020  IMPRESSION: No acute abnormality.     Assessment/Plan      Left hand cellulitis: Possible abscess, status post dog bite, patient will be admitted on a telemetry bed, IV hydration, IV antibiotics, appreciate Ortho input, may consider getting an MRI if symptoms not improving, pain control, venous duplex, supportive care and close monitoring, further intervention per hospitalist, reassess as needed    ESRD: Supportive care, close monitoring, nephrology consult, dialysis per protocol, continue monitor    Acute on chronic anemia: Likely secondary to renal disease and lupus, iron panel, patient denies any hematemesis, hemoptysis, hematuria, melena, get stool for occult blood, give 1 dose of Protonix empirically, monitor H&H and transfuse as needed, further intervention per hospital course, reassess as needed    Hypertension: Continue home medications and continue monitor    Lupus: New home medications and continue monitor, reassess as needed    GI DVT prophylaxis: Patient will be on heparin             Signed By: Louie Morris MD     November 12, 2020

## 2020-11-13 NOTE — PROGRESS NOTES
Problem: Falls - Risk of  Goal: *Absence of Falls  Description: Document Иван Han Fall Risk and appropriate interventions in the flowsheet.   Outcome: Progressing Towards Goal  Note: Fall Risk Interventions:            Medication Interventions: Evaluate medications/consider consulting pharmacy                   Problem: Patient Education: Go to Patient Education Activity  Goal: Patient/Family Education  Outcome: Progressing Towards Goal

## 2020-11-13 NOTE — PROGRESS NOTES
SURJIT:     CRM attempted to see patient however she decllined a visit at this time. I will return and complete one stop.

## 2020-11-14 VITALS
HEART RATE: 90 BPM | RESPIRATION RATE: 18 BRPM | DIASTOLIC BLOOD PRESSURE: 79 MMHG | TEMPERATURE: 97.7 F | HEIGHT: 65 IN | BODY MASS INDEX: 36.89 KG/M2 | WEIGHT: 221.4 LBS | OXYGEN SATURATION: 98 % | SYSTOLIC BLOOD PRESSURE: 118 MMHG

## 2020-11-14 LAB
ALBUMIN SERPL-MCNC: 2.6 G/DL (ref 3.5–5)
ALBUMIN/GLOB SERPL: 0.6 {RATIO} (ref 1.1–2.2)
ALP SERPL-CCNC: 74 U/L (ref 45–117)
ALT SERPL-CCNC: 8 U/L (ref 12–78)
ANION GAP SERPL CALC-SCNC: 11 MMOL/L (ref 5–15)
AST SERPL-CCNC: 6 U/L (ref 15–37)
BASOPHILS # BLD: 0 K/UL (ref 0–0.1)
BASOPHILS NFR BLD: 0 % (ref 0–1)
BILIRUB SERPL-MCNC: 0.3 MG/DL (ref 0.2–1)
BUN SERPL-MCNC: 53 MG/DL (ref 6–20)
BUN/CREAT SERPL: 4 (ref 12–20)
CALCIUM SERPL-MCNC: 8.2 MG/DL (ref 8.5–10.1)
CHLORIDE SERPL-SCNC: 97 MMOL/L (ref 97–108)
CO2 SERPL-SCNC: 23 MMOL/L (ref 21–32)
CREAT SERPL-MCNC: 11.8 MG/DL (ref 0.55–1.02)
DIFFERENTIAL METHOD BLD: ABNORMAL
EOSINOPHIL # BLD: 0.1 K/UL (ref 0–0.4)
EOSINOPHIL NFR BLD: 3 % (ref 0–7)
ERYTHROCYTE [DISTWIDTH] IN BLOOD BY AUTOMATED COUNT: 14.5 % (ref 11.5–14.5)
FERRITIN SERPL-MCNC: 996 NG/ML (ref 26–388)
GLOBULIN SER CALC-MCNC: 4.3 G/DL (ref 2–4)
GLUCOSE SERPL-MCNC: 92 MG/DL (ref 65–100)
HCT VFR BLD AUTO: 21.8 % (ref 35–47)
HGB BLD-MCNC: 6.9 G/DL (ref 11.5–16)
IMM GRANULOCYTES # BLD AUTO: 0 K/UL (ref 0–0.04)
IMM GRANULOCYTES NFR BLD AUTO: 0 % (ref 0–0.5)
IRON SATN MFR SERPL: 42 % (ref 20–50)
IRON SERPL-MCNC: 61 UG/DL (ref 35–150)
LYMPHOCYTES # BLD: 0.7 K/UL (ref 0.8–3.5)
LYMPHOCYTES NFR BLD: 16 % (ref 12–49)
MCH RBC QN AUTO: 30.5 PG (ref 26–34)
MCHC RBC AUTO-ENTMCNC: 31.7 G/DL (ref 30–36.5)
MCV RBC AUTO: 96.5 FL (ref 80–99)
MONOCYTES # BLD: 0.2 K/UL (ref 0–1)
MONOCYTES NFR BLD: 5 % (ref 5–13)
NEUTS SEG # BLD: 3.2 K/UL (ref 1.8–8)
NEUTS SEG NFR BLD: 76 % (ref 32–75)
NRBC # BLD: 0 K/UL (ref 0–0.01)
NRBC BLD-RTO: 0 PER 100 WBC
PLATELET # BLD AUTO: 175 K/UL (ref 150–400)
PMV BLD AUTO: 10.2 FL (ref 8.9–12.9)
POTASSIUM SERPL-SCNC: 5.3 MMOL/L (ref 3.5–5.1)
PROT SERPL-MCNC: 6.9 G/DL (ref 6.4–8.2)
RBC # BLD AUTO: 2.26 M/UL (ref 3.8–5.2)
RBC MORPH BLD: ABNORMAL
SODIUM SERPL-SCNC: 131 MMOL/L (ref 136–145)
TIBC SERPL-MCNC: 146 UG/DL (ref 250–450)
WBC # BLD AUTO: 4.2 K/UL (ref 3.6–11)

## 2020-11-14 PROCEDURE — 74011250636 HC RX REV CODE- 250/636: Performed by: HOSPITALIST

## 2020-11-14 PROCEDURE — 74011250636 HC RX REV CODE- 250/636: Performed by: NURSE PRACTITIONER

## 2020-11-14 PROCEDURE — 74011250636 HC RX REV CODE- 250/636: Performed by: FAMILY MEDICINE

## 2020-11-14 PROCEDURE — 83540 ASSAY OF IRON: CPT

## 2020-11-14 PROCEDURE — 74011636637 HC RX REV CODE- 636/637: Performed by: FAMILY MEDICINE

## 2020-11-14 PROCEDURE — 74011250637 HC RX REV CODE- 250/637: Performed by: PHYSICIAN ASSISTANT

## 2020-11-14 PROCEDURE — 94760 N-INVAS EAR/PLS OXIMETRY 1: CPT

## 2020-11-14 PROCEDURE — 74011250636 HC RX REV CODE- 250/636: Performed by: INTERNAL MEDICINE

## 2020-11-14 PROCEDURE — 82728 ASSAY OF FERRITIN: CPT

## 2020-11-14 PROCEDURE — 5A1D70Z PERFORMANCE OF URINARY FILTRATION, INTERMITTENT, LESS THAN 6 HOURS PER DAY: ICD-10-PCS | Performed by: INTERNAL MEDICINE

## 2020-11-14 PROCEDURE — 36415 COLL VENOUS BLD VENIPUNCTURE: CPT

## 2020-11-14 PROCEDURE — 74011250637 HC RX REV CODE- 250/637: Performed by: NURSE PRACTITIONER

## 2020-11-14 PROCEDURE — 85025 COMPLETE CBC W/AUTO DIFF WBC: CPT

## 2020-11-14 PROCEDURE — 80053 COMPREHEN METABOLIC PANEL: CPT

## 2020-11-14 PROCEDURE — 90935 HEMODIALYSIS ONE EVALUATION: CPT

## 2020-11-14 PROCEDURE — 74011250637 HC RX REV CODE- 250/637: Performed by: FAMILY MEDICINE

## 2020-11-14 RX ORDER — DOXYCYCLINE 100 MG/1
100 CAPSULE ORAL 2 TIMES DAILY
Qty: 14 CAP | Refills: 0 | Status: SHIPPED | OUTPATIENT
Start: 2020-11-14 | End: 2020-11-14

## 2020-11-14 RX ORDER — HEPARIN SODIUM 1000 [USP'U]/ML
1000 INJECTION, SOLUTION INTRAVENOUS; SUBCUTANEOUS ONCE
Status: COMPLETED | OUTPATIENT
Start: 2020-11-14 | End: 2020-11-14

## 2020-11-14 RX ORDER — CLINDAMYCIN HYDROCHLORIDE 300 MG/1
300 CAPSULE ORAL 3 TIMES DAILY
Qty: 21 CAP | Refills: 0 | Status: SHIPPED | OUTPATIENT
Start: 2020-11-14 | End: 2020-11-14

## 2020-11-14 RX ORDER — AMOXICILLIN AND CLAVULANATE POTASSIUM 500; 125 MG/1; MG/1
1 TABLET, FILM COATED ORAL EVERY 12 HOURS
Qty: 20 TAB | Refills: 0 | Status: SHIPPED | OUTPATIENT
Start: 2020-11-14 | End: 2021-01-17

## 2020-11-14 RX ORDER — OXYCODONE HYDROCHLORIDE 5 MG/1
5 TABLET ORAL
Qty: 12 TAB | Refills: 0 | Status: SHIPPED | OUTPATIENT
Start: 2020-11-14 | End: 2020-11-19 | Stop reason: SDUPTHER

## 2020-11-14 RX ADMIN — HEPARIN SODIUM 1000 UNITS: 1000 INJECTION INTRAVENOUS; SUBCUTANEOUS at 11:16

## 2020-11-14 RX ADMIN — VANCOMYCIN HYDROCHLORIDE 1000 MG: 1 INJECTION, POWDER, LYOPHILIZED, FOR SOLUTION INTRAVENOUS at 13:05

## 2020-11-14 RX ADMIN — PANTOPRAZOLE SODIUM 40 MG: 40 TABLET, DELAYED RELEASE ORAL at 07:11

## 2020-11-14 RX ADMIN — Medication 10 ML: at 13:05

## 2020-11-14 RX ADMIN — HYDROMORPHONE HYDROCHLORIDE 0.5 MG: 1 INJECTION, SOLUTION INTRAMUSCULAR; INTRAVENOUS; SUBCUTANEOUS at 14:34

## 2020-11-14 RX ADMIN — OXYCODONE HYDROCHLORIDE 5 MG: 5 TABLET ORAL at 12:37

## 2020-11-14 RX ADMIN — CARVEDILOL 25 MG: 12.5 TABLET, FILM COATED ORAL at 07:10

## 2020-11-14 RX ADMIN — PREDNISONE 10 MG: 10 TABLET ORAL at 08:04

## 2020-11-14 RX ADMIN — Medication 10 ML: at 07:11

## 2020-11-14 RX ADMIN — AZATHIOPRINE 50 MG: 50 TABLET ORAL at 08:04

## 2020-11-14 RX ADMIN — FERRIC CITRATE 420 MG: 210 TABLET, COATED ORAL at 07:10

## 2020-11-14 RX ADMIN — GEMFIBROZIL 300 MG: 600 TABLET ORAL at 08:04

## 2020-11-14 NOTE — PROCEDURES
[facility]  [phone]    Vitals   Pre   Post   Assessment   Pre   Post     Temp  Temp: 97.9 °F (36.6 °C) (11/14/20 0830)   LOC  A&Ox4 A&Ox4   HR   Pulse (Heart Rate): 70 (11/14/20 0830) 81 Lungs   Clear, RA Remains on RA    B/P  BP: 124/87 (11/14/20 0830) 130/86 Cardiac   S1S2, RRR RRR    Resp   Resp Rate: 16 (11/14/20 0830) 16 Skin   Warm, dry. LLA dsg CDI No change    Pain level  Pain Intensity 1: 2 (11/14/20 0754)  Edema  generalized generalized   Orders:    Duration:   Start:    0830 End:    1200 Total:   3.5hrs   Dialyzer:   Dialyzer/Set Up Inspection: Kehinde Roe (11/14/20 0830)   K Bath:   Dialysate K (mEq/L): 2 (11/14/20 0830)   Ca Bath:   Dialysate CA (mEq/L): 2.5 (11/14/20 0830)   Na/Bicarb:   Dialysate NA (mEq/L): 140 (11/14/20 0830)   Target Fluid Removal:   Goal/Amount of Fluid to Remove (mL): 2000 mL (11/14/20 0830)   Access     Type & Location:   PETER AVG +bruit/thrill. Cannulated with 15g needles each site -450 to maintain safe AP/   Labs     Obtained/Reviewed   Critical Results Called   Date when labs were drawn-  Hgb-    HGB   Date Value Ref Range Status   11/12/2020 7.5 (L) 11.5 - 16.0 g/dL Final     K-    Potassium   Date Value Ref Range Status   11/12/2020 3.9 3.5 - 5.1 mmol/L Final     Comment:     INVESTIGATED PER DELTA CHECK PROTOCOL     Ca-   Calcium   Date Value Ref Range Status   11/12/2020 8.0 (L) 8.5 - 10.1 MG/DL Final     Bun-   BUN   Date Value Ref Range Status   11/12/2020 35 (H) 6 - 20 MG/DL Final     Creat-   Creatinine   Date Value Ref Range Status   11/12/2020 8.97 (H) 0.55 - 1.02 MG/DL Final      Medications/ Blood Products Given     Name   Dose   Route and Time     Heparin 1000 units  IVD 1110             Blood Volume Processed (BVP): 72.6L Net Fluid   Removed:  2000 mL   Comments   Time Out Done: 9945  Primary Nurse Rpt Pre: DAWN Amaya RN  Primary Nurse Rpt Post:   Pt Education: access precautions  Care Plan: HD today, resume MWF schedule  Tx Summary:  0825: Safety checks complete, time out performed. 0830: PETER AVG assessed, +bruit/thrill, no redness, warmth or drainage noted. Skin prepped per procedure using alcohol x 60 sec each site. Cannulated using 15g needles each site and secured with tape. +flash/aspiration/flush. HD initiated. Access visible, lines secure. Medications reviewed. 1100:  >260, clot visible in venous chamber. Renny Kuo MD notified. TORB for heparin 1000 unit bolus. 1200: HD treatment complete. All possible blood returned to the patient. De-cannulated and pressure held until hemostasis was achieved. Dressing applied. +bruit/thrill. VSS. SBAR called to primary RN.      Admitting Diagnosis: dog bite, ESRD  Pt's previous clinic: [facility]  Informed Consent Verified: Yes (11/14/20 0830)  Hepatitis Status: HBsAg: Neg (03/16/20) HBsAb: immune (03/16/20)  Machine Number: [id number] (11/14/20 0830)  Telemetry status: not monitored  Pre-Dialysis Weight: 100.4 kg (221 lb 5.5 oz) (11/14/20 0830)

## 2020-11-14 NOTE — DISCHARGE INSTRUCTIONS
Discharge Instructions       PATIENT ID: Yohana Montana  MRN: [id number]   YOB: 1985    DATE OF ADMISSION: 11/12/2020 10:49 AM    DATE OF DISCHARGE: 11/14/2020    PRIMARY CARE PROVIDER: Naresh Strauss NP     ATTENDING PHYSICIAN: Kalani Boland MD  DISCHARGING PROVIDER: Veronica Torrez NP    To contact this individual call [phone] and ask the  to page. If unavailable ask to be transferred the Adult Hospitalist Department. DISCHARGE DIAGNOSES: HTN, HLD and ESRD on HD MWF, Left hand Cellulitis/Dog Bite    CONSULTATIONS: IP CONSULT TO HOSPITALIST  IP CONSULT TO ORTHOPEDIC SURGERY  IP CONSULT TO NEPHROLOGY    PROCEDURES/SURGERIES: * No surgery found *    PENDING TEST RESULTS:   At the time of discharge the following test results are still pending: none. FOLLOW UP APPOINTMENTS:   Follow-up Information     Follow up With Specialties Details Why Contact Info    Naresh Strauss NP Nurse Practitioner In 3 days  [address]  [phone]      Karissa Lpoez MD Nephrology In 3 days Nephrology [facility] [address] [place] [address]             ADDITIONAL CARE RECOMMENDATIONS:   You have been deemed stable for discharge and are being discharged home. You have been prescribed antibiotics, please complete entire course. Should you need medication refills beyond what we have prescribed for you, you will need to contact your primary care provider for refills. Keep hand, bite wound clean and dry. Should you develop worsening redness, swelling or pain, return to nearest ED for evaluation and treatment. DIET: Cardiac Diet    ACTIVITY: Activity as tolerated      DISCHARGE MEDICATIONS:   See Medication Reconciliation Form    · It is important that you take the medication exactly as they are prescribed.    · Keep your medication in the bottles provided by the pharmacist and keep a list of the medication names, dosages, and times to be taken in your wallet. · Do not take other medications without consulting your doctor. NOTIFY YOUR PHYSICIAN FOR ANY OF THE FOLLOWING:   Fever over 101 degrees for 24 hours. Chest pain, shortness of breath, fever, chills, nausea, vomiting, diarrhea, change in mentation, falling, weakness, bleeding. Severe pain or pain not relieved by medications. Or, any other signs or symptoms that you may have questions about. DISPOSITION:   X Home With:   OT  PT  HH  RN       SNF/Inpatient Rehab/LTAC    Independent/assisted living    Hospice    Other:     Patient Education        Animal Bites: Care Instructions  Your Care Instructions  After an animal bite, the biggest concern is infection. The chance of infection depends on the type of animal that bit you, where on your body you were bitten, and your general health. Many animal bites are not closed with stitches, because this can increase the chance of infection. Your bite may take as little as 7 days or as long as several months to heal, depending on how bad it is. Taking good care of your wound at home will help it heal and reduce your chance of infection. The doctor has checked you carefully, but problems can develop later. If you notice any problems or new symptoms, get medical treatment right away. Follow-up care is a key part of your treatment and safety. Be sure to make and go to all appointments, and call your doctor if you are having problems. It's also a good idea to know your test results and keep a list of the medicines you take. How can you care for yourself at home? · If your doctor told you how to care for your wound, follow your doctor's instructions. If you did not get instructions, follow this general advice:  ? After 24 to 48 hours, gently wash the wound with clean water 2 times a day. Do not scrub or soak the wound. Don't use hydrogen peroxide or alcohol, which can slow healing.   ? You may cover the wound with a thin layer of petroleum jelly, such as Vaseline, and a nonstick bandage. ? Apply more petroleum jelly and replace the bandage as needed. · After you shower, gently dry the wound with a clean towel. · If your doctor has closed the wound, cover the bandage with a plastic bag before you take a shower. · A small amount of skin redness and swelling around the wound edges and the stitches or staples is normal. Your wound may itch or feel irritated. Do not scratch or rub the wound. · Ask your doctor if you can take an over-the-counter pain medicine, such as acetaminophen (Tylenol), ibuprofen (Advil, Motrin), or naproxen (Aleve). Read and follow all instructions on the label. · Do not take two or more pain medicines at the same time unless the doctor told you to. Many pain medicines have acetaminophen, which is Tylenol. Too much acetaminophen (Tylenol) can be harmful. · If your bite puts you at risk for rabies, you will get a series of shots over the next few weeks to prevent rabies. Your doctor will tell you when to get the shots. It is very important that you get the full cycle of shots. Follow your doctor's instructions exactly. · You may need a tetanus shot if you have not received one in the last 5 years. · If your doctor prescribed antibiotics, take them as directed. Do not stop taking them just because you feel better. You need to take the full course of antibiotics. When should you call for help? Call your doctor now or seek immediate medical care if:    · The skin near the bite turns cold or pale or it changes color.     · You lose feeling in the area near the bite, or it feels numb or tingly.     · You have trouble moving a limb near the bite.     · You have symptoms of infection, such as:  ? Increased pain, swelling, warmth, or redness near the wound. ? Red streaks leading from the wound. ? Pus draining from the wound. ? A fever.     · Blood soaks through the bandage.  Oozing small amounts of blood is normal.     · Your pain is getting worse. Watch closely for changes in your health, and be sure to contact your doctor if you are not getting better as expected. Where can you learn more? Go to http://www.gray.com/  Enter K393 in the search box to learn more about \"Animal Bites: Care Instructions. \"  Current as of: June 26, 2019               Content Version: 12.6  © 4845-5229 [employer]. Care instructions adapted under license by [employer] (which disclaims liability or warranty for this information). If you have questions about a medical condition or this instruction, always ask your healthcare professional. [place] [address] any warranty or liability for your use of this information.          Signed:   Vianney Albert NP  11/14/2020  12:58 PM

## 2020-11-14 NOTE — PROGRESS NOTES
NAME: Nicolas Shine        :  1985        MRN:  [id number]        Assessment :    Plan:  --ESRD   AVF  Anemia  SLE  Dog bite -MWF HD at [place] [address]         Discharged prior to my rounds

## 2020-11-14 NOTE — DISCHARGE SUMMARY
Discharge Summary       PATIENT ID: Franck Cosby  MRN: [id number]   YOB: 1985    DATE OF ADMISSION: 11/12/2020 10:49 AM    DATE OF DISCHARGE: 11/14/2020     PRIMARY CARE PROVIDER: Riley Reid NP     ATTENDING PHYSICIAN: Jose George MD  DISCHARGING PROVIDER: Eliceo Galvez NP    To contact this individual call [phone] and ask the  to page. If unavailable ask to be transferred the Adult Hospitalist Department. CONSULTATIONS: IP CONSULT TO HOSPITALIST  IP CONSULT TO ORTHOPEDIC SURGERY  IP CONSULT TO NEPHROLOGY    PROCEDURES/SURGERIES: * No surgery found *    ADMITTING DIAGNOSES & HOSPITAL COURSE:     Lupus HTN, HLD and ESRD on HD MWF, Left hand Cellulitis/Dog Bite    This is a 30 y. o. female with PMH of Lupus, HTN, HLD and ESRD on HD MWF. Patient presents with a dog bite 11/11, a pit bull and as far as she knows the dog's immunization is up-to-date. Patient reports that since the dog bite her hand has progressively swollen and increased in pain, worst this am causing her to come to ED for treatment, concerned for an abscess and patient was requested to be admitted under the hospitalist service. Patient denies any other complaints or problems. The patient denies any Headache, blurry vision, sore throat, trouble swallowing, trouble with speech, chest pain, SOB, cough, fever, chills, N/V/D, abd pain, urinary symptoms, constipation, recent travels, sick contacts, focal or generalized neurological symptoms,  falls, injuries, rashes, contact with COVID-19 diagnosed patients, hematemesis, melena, hemoptysis, hematuria, rashes, denies starting any new medications and denies any other concerns or problems besides as mentioned above.        DISCHARGE DIAGNOSES / PLAN:      Left hand cellulitis: 2/2 dog bite, possible abscess  -telemetry bed  -IVF- hold for now and monitor (ESRD)  -IV ABT, discharge home on PO ABT's: Augmentin 500mg-125mg Bid x10 days  -appreciate Ortho input: Volar splint, keep elevated, ice, consider MRI if no improvement. -pain control  -venous duplex: WNL  -XR left hand: No acute abnormality. Keep clean and dry.     ESRD on HD MWF:   -nephrology consult  -last dialysis 11/14     Acute on Chronic Anemia: 2/2 ESRD and Lupus. Continue home iron. -iron panel  -stool for occult blood  -monitor H&H and transfuse as needed to keep Hgb >7.0     HTN: Continue home medications and monitor. Lupus: continue home medications and monitor, reassess as needed. HLD: continue Lopid. Morbid Obesity: BMI: 35.59. Encouraged increasing activity and decreasing caloric intake. ADDITIONAL CARE RECOMMENDATIONS:   You have been deemed stable for discharge and are being discharged home. You have been prescribed antibiotics, please complete entire course. Should you need medication refills beyond what we have prescribed for you, you will need to contact your primary care provider for refills. Keep hand, bite wound clean and dry. Should you develop worsening redness, swelling or pain, return to nearest ED for evaluation and treatment. PENDING TEST RESULTS:   At the time of discharge the following test results are still pending: none. FOLLOW UP APPOINTMENTS:    Follow-up Information     Follow up With Specialties Details Why Contact Info    Yoselin Benítez NP Nurse Practitioner In 3 days  [address]  [phone]      Rekha Hoffmann MD Nephrology In 3 days Nephrology [address] [place] [address] [address] [place] [address]               DIET: Cardiac Diet    ACTIVITY: Activity as tolerated        DISCHARGE MEDICATIONS:  Current Discharge Medication List      START taking these medications    Details   oxyCODONE IR (ROXICODONE) 5 mg immediate release tablet Take 1 Tab by mouth every eight (8) hours as needed for Pain for up to 3 days.  Max Daily Amount: 15 mg.  Qty: 12 Tab, Refills: 0    Associated Diagnoses: Dog bite, initial encounter; Cellulitis of hand      amoxicillin-clavulanate (Augmentin) 500-125 mg per tablet Take 1 Tab by mouth every twelve (12) hours. Indications: an infection of the skin and the tissue below the skin  Qty: 20 Tab, Refills: 0         CONTINUE these medications which have NOT CHANGED    Details   amitriptyline (ELAVIL) 50 mg tablet Take 3 Tabs by mouth nightly. Antidepressant to help insomnia. Qty: 90 Tab, Refills: 2    Associated Diagnoses: Insomnia, unspecified type      pantoprazole (PROTONIX) 40 mg tablet Take 1 Tab by mouth ACB/HS. Qty: 60 Tab, Refills: 0      ferric citrate (AURYXIA) 210 mg iron tablet Take 420 mg by mouth three (3) times daily (with meals). carvedilol (COREG) 25 mg tablet Take 1 Tab by mouth two (2) times daily (with meals). Qty: 60 Tab, Refills: 0      predniSONE (DELTASONE) 5 mg tablet Take 2 Tabs by mouth daily. Qty: 30 Tab, Refills: 0      gemfibrozil (LOPID) 600 mg tablet Take 300 mg by mouth daily. azaTHIOprine (IMURAN) 50 mg tablet Take 50 mg by mouth daily (after breakfast). ondansetron (Zofran ODT) 4 mg disintegrating tablet Take 1 Tab by mouth every eight (8) hours as needed for Nausea. Qty: 15 Tab, Refills: 0         STOP taking these medications       apixaban (ELIQUIS) 5 mg tablet Comments:   Reason for Stopping:                 NOTIFY YOUR PHYSICIAN FOR ANY OF THE FOLLOWING:   Fever over 101 degrees for 24 hours. Chest pain, shortness of breath, fever, chills, nausea, vomiting, diarrhea, change in mentation, falling, weakness, bleeding. Severe pain or pain not relieved by medications. Or, any other signs or symptoms that you may have questions about.     DISPOSITION:  X Home With:   OT  PT  HH  RN       Long term SNF/Inpatient Rehab    Independent/assisted living    Hospice    Other:       PATIENT CONDITION AT DISCHARGE:     Functional status    Poor     Deconditioned    X Independent      Cognition   X  Lucid     Forgetful     Dementia Catheters/lines (plus indication)    Franco     PICC     PEG    X None      Code status   X  Full code     DNR      PHYSICAL EXAMINATION AT DISCHARGE:    Constitutional:  No acute distress, cooperative, pleasant    ENT:  Oral mucosa moist.    Resp:  CTA bilaterally. No wheezing/rhonchi/rales. No accessory muscle use. CV:  Regular rhythm, normal rate, no murmurs, gallops, rubs. /GI:  Soft, non distended, non tender, no guarding, BS present. Musculoskeletal:  No edema, warm, 2+ pulses throughout. Neurologic:  Moves all extremities. AAOx3, CN II-XII reviewed. Skin:  Good turgor, LUE: bite marks noted from wrist to fingers, multiple, no erythema, no warmth and edema improved. Psych:  Good insight, Not anxious nor agitated.          CHRONIC MEDICAL DIAGNOSES:  Problem List as of 11/14/2020 Date Reviewed: 7/31/2019          Codes Class Noted - Resolved    Abscess ICD-10-CM: L02.91  ICD-9-CM: 682.9  11/12/2020 - Present        Pneumonia ICD-10-CM: J18.9  ICD-9-CM: 819  3/14/2020 - Present        Seizure ([facility] ICD-10-CM: R56.9  ICD-9-CM: 098.97  10/18/2019 - Present        HCAP (healthcare-associated pneumonia) ICD-10-CM: J18.9  ICD-9-CM: 695  7/16/2019 - Present        Anemia due to blood loss ICD-10-CM: D50.0  ICD-9-CM: 280.0  4/21/2019 - Present        Vaginal bleeding ICD-10-CM: N93.9  ICD-9-CM: 623.8  4/18/2019 - Present        Hyperkalemia ICD-10-CM: E87.5  ICD-9-CM: 276.7  3/4/2019 - Present        ESRD on hemodialysis ([facility] ICD-10-CM: N18.6, Z99.2  ICD-9-CM: 585.6, V45.11  8/27/2018 - Present        SOB (shortness of breath) ICD-10-CM: R06.02  ICD-9-CM: 786.05  8/15/2018 - Present        Rib pain on right side ICD-10-CM: R07.81  ICD-9-CM: 786.50  7/2/2018 - Present        Troponin level elevated ICD-10-CM: R77.8  ICD-9-CM: 790.6  6/17/2018 - Present        Intra-abdominal abscess ([facility] ICD-10-CM: K65.1  ICD-9-CM: 567.22  5/12/2018 - Present        Sepsis ([facility] ICD-10-CM: A41.9  ICD-9-CM: 038.9, 995.91  4/29/2018 - Present        Generalized abdominal pain ICD-10-CM: R10.84  ICD-9-CM: 789.07  4/4/2018 - Present        Other constipation ICD-10-CM: K59.09  ICD-9-CM: 564.09  4/4/2018 - Present        Other ascites ICD-10-CM: R18.8  ICD-9-CM: 789.59  4/4/2018 - Present        Peritonitis ([facility] ICD-10-CM: K65.9  ICD-9-CM: 567.9  3/11/2018 - Present        History of pulmonary embolism ICD-10-CM: A39.958  ICD-9-CM: V12.55  12/8/2017 - Present        Hypomagnesemia ICD-10-CM: E83.42  ICD-9-CM: 275.2  10/30/2017 - Present        Hypocalcemia ICD-10-CM: E83.51  ICD-9-CM: 275.41  10/30/2017 - Present        Hypokalemia ICD-10-CM: E87.6  ICD-9-CM: 276.8  10/27/2017 - Present        Hypertension (Chronic) ICD-10-CM: I10  ICD-9-CM: 401.9  10/14/2017 - Present        Chronic bilateral low back pain without sciatica ICD-10-CM: M54.5, G89.29  ICD-9-CM: 724.2, 338.29  5/26/2017 - Present        Anemia of renal disease ICD-10-CM: N18.9, D63.1  ICD-9-CM: 285.21  2/21/2017 - Present        Dependence on peritoneal dialysis [facility]) ICD-10-CM: Z99.2  ICD-9-CM: V45.11  2/21/2017 - Present        ACP (advance care planning) ICD-10-CM: Z71.89  ICD-9-CM: V65.49  2/21/2017 - Present        Malignant hypertension ICD-10-CM: I10  ICD-9-CM: 401.0  7/11/2016 - Present        Encounter for monitoring opioid maintenance therapy ICD-10-CM: Z51.81, Z79.891  ICD-9-CM: V58.83, V58.69  11/3/2015 - Present        Lupus 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 ICD-10-CM: Q71.159  ICD-9-CM: 560.9  7/31/2019 - 8/14/2019        RESOLVED: Neutropenia ([facility] ICD-10-CM: D70.9  ICD-9-CM: 288.00  9/15/2018 - 9/19/2018        RESOLVED: Anemia in chronic kidney disease ICD-10-CM: N18.9, D63.1  ICD-9-CM: 285.21  9/15/2018 - 9/19/2018        RESOLVED: Malignant hypertensive urgency ICD-10-CM: I16.0  ICD-9-CM: 401.0  9/10/2018 - 9/19/2018        RESOLVED: Hypertensive urgency ICD-10-CM: I16.0  ICD-9-CM: 401.9  7/19/2018 - 7/22/2018        RESOLVED: Sepsis ([facility] ICD-10-CM: A41.9  ICD-9-CM: 038.9, 995.91  12/12/2017 - 12/22/2017        RESOLVED: Pneumonia ICD-10-CM: J18.9  ICD-9-CM: 486  10/14/2017 - 12/8/2017        RESOLVED: Pleural effusion, left ICD-10-CM: J90  ICD-9-CM: 511.9  10/14/2017 - 12/8/2017        RESOLVED: ESRD on peritoneal dialysis ([facility] (Chronic) ICD-10-CM: N18.6, Z99.2  ICD-9-CM: 585.6, V45.11  10/7/2016 - 8/27/2018        RESOLVED: Idiopathic chronic inflammatory bowel disease ICD-10-CM: K63.89  ICD-9-CM: 558.9  8/28/2013 - 8/28/2013        RESOLVED: Abdominal pain ICD-10-CM: R10.9  ICD-9-CM: 789.00  8/28/2013 - 9/3/2013        RESOLVED: Hypoxia ICD-10-CM: R09.02  ICD-9-CM: 799.02  4/25/2013 - 4/30/2013        RESOLVED: Lupus nephritis ([facility] ICD-10-CM: M32.14  ICD-9-CM: 710.0, 583.81  4/27/2012 - 4/28/2012              Greater than 30 minutes were spent with the patient on counseling and coordination of care    Signed:   Sofia Cao NP  11/14/2020  1:12 PM

## 2020-11-14 NOTE — PROGRESS NOTES
ORTHO PROGRESS NOTE      SUBJECTIVE:  Suyapa Schumacher thinks her hand pain is somewhat improved. Still pain with AROM digits. .    OBJECTIVE:  Patient Vitals for the past 24 hrs:   Temp Pulse BP   11/14/20 0930 -- 71 (!) 131/96   11/14/20 0915 -- 71 (!) 142/103   11/14/20 0900 -- 70 (!) 125/93   11/14/20 0845 -- 68 (!) 131/93   11/14/20 0830 97.9 °F (36.6 °C) 70 124/87   11/14/20 0754 97.7 °F (36.5 °C) 71 115/73   11/14/20 0256 98.1 °F (36.7 °C) 74 115/77   11/13/20 2001 98.3 °F (36.8 °C) 77 115/74   11/13/20 1452 98.5 °F (36.9 °C) 82 104/70       Alert, no distress. Lying in bed dialysis suite. No family present. Respirations unlabored. I remove splint. Punctures dorsal hand but no erythema and no fluctuance. No streaking. Focal edema and TTP dorsal hand over index and middle metacarpals. Moves wrist with mild limitations. Makes fist OK. Full ext thumb, ring, and pinky. Near full ext middle. Improving ext index. CR brisk, SILT. Recent Labs     11/14/20  0738 11/12/20  2312   HGB 6.9* 7.5*   HCT 21.8* 22.9*    185   BUN  --  35*   CREA  --  8.97*   GFRAA  --  6*   GFRNA  --  5*     WBC 4.2. ASSESSMENT:  Dog bite left hand. PLAN:  She's improving with current conservative treatment. No plan for advanced imaging at this time. Following. D/W Dr. Hutson Resides who agrees.     SINAN Espinoza  Orthopedic Trauma Service  [facility]

## 2020-11-14 NOTE — PROGRESS NOTES
Problem: Falls - Risk of  Goal: *Absence of Falls  Description: Document Stephen Begum Fall Risk and appropriate interventions in the flowsheet.   Outcome: Progressing Towards Goal  Note: Fall Risk Interventions:            Medication Interventions: Evaluate medications/consider consulting pharmacy                   Problem: Patient Education: Go to Patient Education Activity  Goal: Patient/Family Education  Outcome: Progressing Towards Goal

## 2020-11-14 NOTE — PROGRESS NOTES
Problem: Falls - Risk of  Goal: *Absence of Falls  Description: Document Leann Sloan Fall Risk and appropriate interventions in the flowsheet.   11/14/2020 1324 by Tamika Santos RN  Outcome: Resolved/Met  Note: Fall Risk Interventions:            Medication Interventions: Evaluate medications/consider consulting pharmacy                11/14/2020 0802 by Tamika Santos RN  Outcome: Progressing Towards Goal  Note: Fall Risk Interventions:            Medication Interventions: Evaluate medications/consider consulting pharmacy                   Problem: Patient Education: Go to Patient Education Activity  Goal: Patient/Family Education  11/14/2020 1324 by Tamika Santos RN  Outcome: Resolved/Met  11/14/2020 0802 by Tamika Santos RN  Outcome: Progressing Towards Goal

## 2020-11-16 ENCOUNTER — PATIENT OUTREACH (OUTPATIENT)
Dept: CASE MANAGEMENT | Age: 35
End: 2020-11-16

## 2020-11-16 NOTE — PROGRESS NOTES
Patient was admitted to [facility] on  and discharged on  for abscess/left hand cellulitis from dog bite. Outreach made within 2 business days of discharge: Yes    Top Discharge Challenges to be reviewed by the provider   Additional needs identified to be addressed with provider -yes  No ACP on file. Discussed COVID-19 related testing which was not done at this time. Test results were not done. Method of communication with provider : chart routing       Advance Care Planning:   Does patient have an Advance Directive:  currently not on file; patient declined education    Inpatient Readmission Risk score: not available  Was this a readmission? no  Patient stated reason for the admission: hand pain from dog bite/wound  Patients top risk factors for readmission: medical condition  Interventions to address risk factors: reviewed discharge instructions with her, reviewed meds- education on new meds provided, reviewed red flags: worsening redness/pain/swelling at wound, fever,nausea,vomiting,diarrhea,sob. Care Transition Nurse (CTN) contacted the patient by telephone to perform post hospital discharge assessment. Verified name and  with patient as identifiers. Provided introduction to self, and explanation of the CTN role. Patient had just completed her HD this am. Says she is better, hand is less sore. Took dose of Oxycodone last night before bed. Has not had any since then. Given info on [employer] as resource. Given CTN contact info. CTN reviewed discharge instructions, medical action plan and red flags with patient who verbalized understanding. Patient given an opportunity to ask questions and does not have any further questions or concerns at this time. The patient agrees to contact the PCP office for questions related to their healthcare.      Medication reconciliation was performed with patient, who verbalizes understanding of administration of home medications. Advised obtaining a 90-day supply of all daily and as-needed medications. Referral to Pharm D needed: no     Home Health/Outpatient orders at discharge: [address]: na  Date of initial visit: na    Durable Medical Equipment ordered at discharge: none  [facility] [address] received: na    Covid Risk Education    Patient has following risk factors of: chronic kidney disease. Education provided regarding infection prevention, and signs and symptoms of COVID-19 and when to seek medical attention with patient who verbalized understanding. Discussed exposure protocols and quarantine From CDC: Are you at higher risk for severe illness?  and given an opportunity for questions and concerns. The patient agrees to contact the COVID-19 hotline [phone] or PCP office for questions related to COVID-19. For more information on steps you can take to protect yourself, see CDC's How to Protect Yourself     Patient/family/caregiver given information for [employer] and agrees to enroll no  Patient's preferred e-mail: declines  Patient's preferred phone number: declines    Discussed follow-up appointments. If no appointment was previously scheduled, appointment scheduling offered: yes  [facility] follow up appointment(s):   Future Appointments   Date Time Provider Rocio Hardy   12/4/2020 10:40 AM Patricia Everett  S [facility]     [facility] follow up appointment(s): Ailyn Palacio- 11/16 (saw at ). Plan for follow-up call in 7-10 days based on severity of symptoms and risk factors. CTN provided contact information for future needs. Goals Addressed                 This Visit's Progress     Prevent complications post hospitalization.         11/16/20 [facility] [place] 11/12-11/14 Left hand cellullitis/ dog bite   Called patient and reviewed discharge instructions   Reviewed meds- education on new meds.  Reviewed red flags: increased redness/swelling/pain at site of wound, fever,nausea,vomiting,diarrhea,.  SURJIT with pcp- patient at dialysis, will call when completed.  Saw neph, Dr.Deep Lily Johnson, this am at HD.  Given info on [employer] as resource.  Given CTN contact info if any questions/concerns.    Advised CTN to check back in about a week, sooner [email or web address]

## 2020-11-17 LAB
BACTERIA SPEC CULT: NORMAL
SERVICE CMNT-IMP: NORMAL

## 2020-11-18 LAB
BACTERIA SPEC CULT: NORMAL
SERVICE CMNT-IMP: NORMAL

## 2020-11-19 ENCOUNTER — OFFICE VISIT (OUTPATIENT)
Dept: FAMILY MEDICINE CLINIC | Age: 35
End: 2020-11-19
Payer: MEDICARE

## 2020-11-19 ENCOUNTER — TELEPHONE (OUTPATIENT)
Dept: FAMILY MEDICINE CLINIC | Age: 35
End: 2020-11-19

## 2020-11-19 VITALS
BODY MASS INDEX: 37.49 KG/M2 | RESPIRATION RATE: 16 BRPM | HEART RATE: 66 BPM | HEIGHT: 65 IN | WEIGHT: 225 LBS | TEMPERATURE: 97.7 F | DIASTOLIC BLOOD PRESSURE: 94 MMHG | OXYGEN SATURATION: 97 % | SYSTOLIC BLOOD PRESSURE: 136 MMHG

## 2020-11-19 DIAGNOSIS — M32.14 LUPUS NEPHRITIS (HCC): ICD-10-CM

## 2020-11-19 DIAGNOSIS — Z23 ENCOUNTER FOR IMMUNIZATION: ICD-10-CM

## 2020-11-19 DIAGNOSIS — Z00.00 MEDICARE ANNUAL WELLNESS VISIT, SUBSEQUENT: ICD-10-CM

## 2020-11-19 DIAGNOSIS — Z71.89 ACP (ADVANCE CARE PLANNING): ICD-10-CM

## 2020-11-19 DIAGNOSIS — S61.452D DOG BITE OF LEFT HAND, SUBSEQUENT ENCOUNTER: ICD-10-CM

## 2020-11-19 DIAGNOSIS — D63.1 ANEMIA OF RENAL DISEASE: ICD-10-CM

## 2020-11-19 DIAGNOSIS — Z99.2 ESRD ON HEMODIALYSIS (HCC): ICD-10-CM
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
## 2020-11-19 DIAGNOSIS — R56.9 SEIZURE (HCC): ICD-10-CM

## 2020-11-19 DIAGNOSIS — N18.9 ANEMIA OF RENAL DISEASE: ICD-10-CM

## 2020-11-19 DIAGNOSIS — L03.119 CELLULITIS OF HAND: ICD-10-CM

## 2020-11-19 DIAGNOSIS — E66.01 SEVERE OBESITY (HCC): ICD-10-CM
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
## 2020-11-19 DIAGNOSIS — L03.114 CELLULITIS OF LEFT HAND: Primary | ICD-10-CM

## 2020-11-19 DIAGNOSIS — I10 MALIGNANT HYPERTENSION: ICD-10-CM

## 2020-11-19 DIAGNOSIS — N18.6 ESRD ON HEMODIALYSIS (HCC): ICD-10-CM

## 2020-11-19 DIAGNOSIS — Z23 NEEDS FLU SHOT: ICD-10-CM

## 2020-11-19 PROBLEM — A41.9 SEPSIS (HCC): Status: RESOLVED | Noted: 2018-04-29 | Resolved: 2020-11-19

## 2020-11-19 PROBLEM — J18.9 HCAP (HEALTHCARE-ASSOCIATED PNEUMONIA): Status: RESOLVED | Noted: 2019-07-16 | Resolved: 2020-11-19

## 2020-11-19 PROBLEM — E87.6 HYPOKALEMIA: Status: RESOLVED | Noted: 2017-10-27 | Resolved: 2020-11-19

## 2020-11-19 PROBLEM — K65.1 INTRA-ABDOMINAL ABSCESS (HCC): Status: RESOLVED | Noted: 2018-05-12 | Resolved: 2020-11-19

## 2020-11-19 PROBLEM — K65.9 PERITONITIS (HCC): Status: RESOLVED | Noted: 2018-03-11 | Resolved: 2020-11-19

## 2020-11-19 PROBLEM — E87.5 HYPERKALEMIA: Status: RESOLVED | Noted: 2019-03-04 | Resolved: 2020-11-19

## 2020-11-19 PROBLEM — J18.9 PNEUMONIA: Status: RESOLVED | Noted: 2020-03-14 | Resolved: 2020-11-19

## 2020-11-19 PROCEDURE — G9231 DOC ESRD DIA TRANS PREG: HCPCS | Performed by: NURSE PRACTITIONER

## 2020-11-19 PROCEDURE — G8427 DOCREV CUR MEDS BY ELIG CLIN: HCPCS | Performed by: NURSE PRACTITIONER

## 2020-11-19 PROCEDURE — 90471 IMMUNIZATION ADMIN: CPT | Performed by: NURSE PRACTITIONER

## 2020-11-19 PROCEDURE — 99496 TRANSJ CARE MGMT HIGH F2F 7D: CPT | Performed by: NURSE PRACTITIONER

## 2020-11-19 PROCEDURE — G0439 PPPS, SUBSEQ VISIT: HCPCS | Performed by: NURSE PRACTITIONER

## 2020-11-19 PROCEDURE — G8417 CALC BMI ABV UP PARAM F/U: HCPCS | Performed by: NURSE PRACTITIONER

## 2020-11-19 PROCEDURE — 1111F DSCHRG MED/CURRENT MED MERGE: CPT | Performed by: NURSE PRACTITIONER

## 2020-11-19 PROCEDURE — G8432 DEP SCR NOT DOC, RNG: HCPCS | Performed by: NURSE PRACTITIONER

## 2020-11-19 PROCEDURE — 90715 TDAP VACCINE 7 YRS/> IM: CPT

## 2020-11-19 RX ORDER — DOXYCYCLINE 100 MG/1
100 TABLET ORAL 2 TIMES DAILY
Qty: 20 TAB | Refills: 0 | Status: SHIPPED | OUTPATIENT
Start: 2020-11-19 | End: 2020-11-29

## 2020-11-19 RX ORDER — OXYCODONE HYDROCHLORIDE 5 MG/1
5 TABLET ORAL
Qty: 12 TAB | Refills: 0 | Status: SHIPPED | OUTPATIENT
Start: 2020-11-19 | End: 2020-11-23

## 2020-11-19 NOTE — PATIENT INSTRUCTIONS
Animal Bites: Care Instructions  Your Care Instructions  After an animal bite, the biggest concern is infection. The chance of infection depends on the type of animal that bit you, where on your body you were bitten, and your general health. Many animal bites are not closed with stitches, because this can increase the chance of infection. Your bite may take as little as 7 days or as long as several months to heal, depending on how bad it is. Taking good care of your wound at home will help it heal and reduce your chance of infection. The doctor has checked you carefully, but problems can develop later. If you notice any problems or new symptoms, get medical treatment right away. Follow-up care is a key part of your treatment and safety. Be sure to make and go to all appointments, and call your doctor if you are having problems. It's also a good idea to know your test results and keep a list of the medicines you take. How can you care for yourself at home? · If your doctor told you how to care for your wound, follow your doctor's instructions. If you did not get instructions, follow this general advice:  ? After 24 to 48 hours, gently wash the wound with clean water 2 times a day. Do not scrub or soak the wound. Don't use hydrogen peroxide or alcohol, which can slow healing. ? You may cover the wound with a thin layer of petroleum jelly, such as Vaseline, and a nonstick bandage. ? Apply more petroleum jelly and replace the bandage as needed. · After you shower, gently dry the wound with a clean towel. · If your doctor has closed the wound, cover the bandage with a plastic bag before you take a shower. · A small amount of skin redness and swelling around the wound edges and the stitches or staples is normal. Your wound may itch or feel irritated. Do not scratch or rub the wound.   · Ask your doctor if you can take an over-the-counter pain medicine, such as acetaminophen (Tylenol), ibuprofen (Advil, Motrin), or naproxen (Aleve). Read and follow all instructions on the label. · Do not take two or more pain medicines at the same time unless the doctor told you to. Many pain medicines have acetaminophen, which is Tylenol. Too much acetaminophen (Tylenol) can be harmful. · If your bite puts you at risk for rabies, you will get a series of shots over the next few weeks to prevent rabies. Your doctor will tell you when to get the shots. It is very important that you get the full cycle of shots. Follow your doctor's instructions exactly. · You may need a tetanus shot if you have not received one in the last 5 years. · If your doctor prescribed antibiotics, take them as directed. Do not stop taking them just because you feel better. You need to take the full course of antibiotics. When should you call for help? Call your doctor now or seek immediate medical care if:    · The skin near the bite turns cold or pale or it changes color.     · You lose feeling in the area near the bite, or it feels numb or tingly.     · You have trouble moving a limb near the bite.     · You have symptoms of infection, such as:  ? Increased pain, swelling, warmth, or redness near the wound. ? Red streaks leading from the wound. ? Pus draining from the wound. ? A fever.     · Blood soaks through the bandage. Oozing small amounts of blood is normal.     · Your pain is getting worse. Watch closely for changes in your health, and be sure to contact your doctor if you are not getting better as expected. Where can you learn more? Go to http://www.gray.com/  Enter U312 in the search box to learn more about \"Animal Bites: Care Instructions. \"  Current as of: June 26, 2019               Content Version: 12.6  © 6836-1060 [employer]. Care instructions adapted under license by [employer] (which disclaims liability or warranty for this information).  If you have questions about a medical condition or this instruction, always ask your healthcare professional. [place] [address] any warranty or liability for your use of this information.

## 2020-11-19 NOTE — PROGRESS NOTES
This is the Subsequent Medicare Annual Wellness Exam, performed 12 months or more after the Initial AWV or the last Subsequent AWV    I have reviewed the patient's medical history in detail and updated the computerized patient record. Depression Risk Factor Screening:     3 most recent PHQ Screens 11/19/2020   PHQ Not Done -   Little interest or pleasure in doing things Not at all   Feeling down, depressed, irritable, or hopeless Not at all   Total Score PHQ 2 0       Alcohol Risk Screen   Do you average more than 1 drink per night or more than 7 drinks a week:  No    On any one occasion in the past three months have you have had more than 3 drinks containing alcohol:  No        Functional Ability and Level of Safety:   Hearing: Hearing is good. Activities of Daily Living: The home contains: no safety equipment. Patient does total self care     Ambulation: with no difficulty     Fall Risk:  Fall Risk Assessment, last 12 mths 10/24/2017   Able to walk? Yes   Fall in past 12 months? No     Abuse Screen:  Patient is not abused       Cognitive Screening   Has your family/caregiver stated any concerns about your memory: no      Assessment/Plan   Education and counseling provided:  Are appropriate based on today's review and evaluation    Diagnoses and all orders for this visit:    1. Cellulitis of left hand  -     doxycycline (ADOXA) 100 mg tablet; Take 1 Tab by mouth two (2) times a day for 10 days. 2. Dog bite of left hand, subsequent encounter    3. Lupus nephritis ([address]    4. Seizure ([address]    5. Malignant hypertension    6. ESRD on hemodialysis ([address]    7. Anemia of renal disease    8. Encounter for immunization  -     [place] IMMUNIZ ADMIN,1 SINGLE/COMB VAC/TOXOID  -     TETANUS, DIPHTHERIA TOXOIDS AND ACELLULAR PERTUSSIS VACCINE (TDAP), IN INDIVIDS. >=7, IM    9. Medicare annual wellness visit, subsequent    10. ACP (advance care planning)    11.  Needs flu shot        Health Maintenance Due     Health Maintenance Due   Topic Date Due    Pneumococcal 0-64 years (1 of 3 - PCV13) 02/17/1992    DTaP/Tdap/Td series (1 - Tdap) 02/17/2007    PAP AKA CERVICAL CYTOLOGY  04/13/2019    Medicare Yearly Exam  05/05/2020    Flu Vaccine (1) 09/01/2020       Patient Care Team   Patient Care Team:  Rosaura Espinoza NP as PCP - General (Nurse Practitioner)  Rosaura Espinoza NP as PCP - [facility]  Tennille Quintanilla MD (Neurology)  Jose Sierra MD as Physician (Rheumatology)  Starr Franks, [facility]  Petros Bustillo MD as Surgeon (General Surgery)  Elizabeth Emanuel MD (Nephrology)  Angus Moffett RN as Care Transitions Nurse (Family Medicine)    History     Patient Active Problem List   Diagnosis Code    Lupus nephritis [facility]) M32.14    Malignant hypertension I10    Anemia of renal disease N18.9, D63.1    ACP (advance care planning) Z71.89    History of pulmonary embolism Z86.711    Other constipation K59.09    ESRD on hemodialysis ([address] N18.6, Z99.2    Seizure ([address] R56.9     Past Medical History:   Diagnosis Date    Anemia     secondary to lupus    Asthma     no inhaler use in past 2 to 3 years    Carditis     Chronic kidney disease     ESRD    Chronic pain     DDD (degenerative disc disease), lumbar     ESRD (end stage renal disease) ([address]     GERD (gastroesophageal reflux disease)     HCAP (healthcare-associated pneumonia) 7/16/2019    Hemodialysis patient [facility]) 12/21/2017    [address]  Tuesday,  Thursday,  and Saturday.      Hypercholesterolemia     Hyperkalemia 3/4/2019    Hypertension     Hypokalemia 10/27/2017    Intra-abdominal abscess ([address] 5/12/2018    Intractable nausea and vomiting 10/21/2015    Long term (current) use of anticoagulants     Lupus (systemic lupus erythematosus) (HCC)     Malignant hypertension with chronic kidney disease stage V ([address]     Peritoneal dialysis status ([address] 10/2015    x 2 years Stopped 2017 due to infection and removed.  Peritonitis ([facility] 3/11/2018    Pneumonia 3/14/2020    Poor historian 2018    With medications    Sepsis ([facility] 2018    Thromboembolus ([facility] 2013    lungs    Transfusion history     Last Transfusion 2017  at [facility]      Past Surgical History:   Procedure Laterality Date    HX  SECTION  11/2006    x1    HX OTHER SURGICAL  9/16/15    INSERTION PD CATH; Removed 2017    HX VASCULAR ACCESS Right 2017    Double-Lumen henry catheter upper chest    HX VASCULAR ACCESS Right 2018    right upper arm     Current Outpatient Medications   Medication Sig Dispense Refill    doxycycline (ADOXA) 100 mg tablet Take 1 Tab by mouth two (2) times a day for 10 days. 20 Tab 0    amoxicillin-clavulanate (Augmentin) 500-125 mg per tablet Take 1 Tab by mouth every twelve (12) hours. Indications: an infection of the skin and the tissue below the skin 20 Tab 0    amitriptyline (ELAVIL) 50 mg tablet Take 3 Tabs by mouth nightly. Antidepressant to help insomnia. 90 Tab 2    ondansetron (Zofran ODT) 4 mg disintegrating tablet Take 1 Tab by mouth every eight (8) hours as needed for Nausea. 15 Tab 0    pantoprazole (PROTONIX) 40 mg tablet Take 1 Tab by mouth ACB/HS. 60 Tab 0    ferric citrate (AURYXIA) 210 mg iron tablet Take 420 mg by mouth three (3) times daily (with meals).  carvedilol (COREG) 25 mg tablet Take 1 Tab by mouth two (2) times daily (with meals). 60 Tab 0    predniSONE (DELTASONE) 5 mg tablet Take 2 Tabs by mouth daily. 30 Tab 0    gemfibrozil (LOPID) 600 mg tablet Take 300 mg by mouth daily.  azaTHIOprine (IMURAN) 50 mg tablet Take 50 mg by mouth daily (after breakfast).        Allergies   Allergen Reactions    Compazine [Prochlorperazine Edisylate] Nausea and Vomiting and Palpitations     \"hot and lightheaded\"       Family History   Problem Relation Age of Onset    Diabetes Father     Hypertension Father     Cancer Other         aunt with breast cancer    Diabetes Mother      Social History     Tobacco Use    Smoking status: Never Smoker    Smokeless tobacco: Never Used   Substance Use Topics    Alcohol use:  No

## 2020-11-19 NOTE — PROGRESS NOTES
Chief Complaint   Patient presents with    Dog Bite     x one week ago. patients dog UTD on rabies vaccine     1. Have you been to the ER, urgent care clinic since your last visit? Hospitalized since your last visit? Yes When: [facility] ED dog bite    2. Have you seen or consulted any other health care providers outside of the [address] [place] [address] since your last visit? Include any pap smears or colon screening.  No

## 2020-11-19 NOTE — TELEPHONE ENCOUNTER
----- Message from [place] sent at 11/19/2020 12:49 PM EST -----  Regarding: KHANH Caballero/Telephone  General Message/Vendor Calls    Caller's first and last name: Kevan Murphy      Reason for call: Pt had a visit today with KHANH Caballero and thought a pain medication was going to be called in for her. Requesting a call back to discuss.        Callback required yes/no and why: yes      Best contact number(s):[phone]      Details to clarify the request: [facility]

## 2020-11-19 NOTE — PROGRESS NOTES
[facility] Note    Ms. Andrez Quiroz is a 30y.o. year old female, she is seen today for Transition of Care services following a hospital discharge for cellulitis on 11/14/20. Our office Nurse Navigator performed an outreach to Ms. Tatyana Ledbetter on 11/16/20 (within 2 business days of discharge) to complete medication reconciliation and a telephonic assessment of her condition. Subjective:  Cardiovascular Review:  The patient has malignant hypertension. Diet and Lifestyle: generally follows a low fat low cholesterol diet, generally follows a low sodium diet, sedentary, nonsmoker  Home BP Monitoring: is not measured at home. Pertinent ROS: not taking medications regularly as instructed, no TIA's, no chest pain on exertion, no dyspnea on exertion, no swelling of ankles. Chronic Kidney Disease  Patient has ESRD s/t lupus nephritis and is having hemodialysis on F. Reports increased edema since hospital admission. Seen by nephrology, Dr. Socrates Paz. Bite Wound  Patient seen for follow up of a bite wound to the left hand. History of bite: dog bite. Injury occurred 1 week ago on 11/12/20. The patient reports no coldness, no numbness. Patient presented to [facility] [place] [facility] and was admitted for cellulitis. She was treated with Augmentin with only mild improvement of symptoms. Taking Percocet PRN for pain. Prior to Admission medications    Medication Sig Start Date End Date Taking? Authorizing Provider   doxycycline (ADOXA) 100 mg tablet Take 1 Tab by mouth two (2) times a day for 10 days. 11/19/20 11/29/20 Yes Nancy Fleming NP   amoxicillin-clavulanate (Augmentin) 500-125 mg per tablet Take 1 Tab by mouth every twelve (12) hours. Indications: an infection of the skin and the tissue below the skin 11/14/20  Yes Shimon Wong NP   amitriptyline (ELAVIL) 50 mg tablet Take 3 Tabs by mouth nightly. Antidepressant to help insomnia.  10/12/20  Yes Ted Colon MD   ondansetron (Zofran ODT) 4 mg disintegrating tablet Take 1 Tab by mouth every eight (8) hours as needed for Nausea. 3/28/20  Yes Casper Alejandre,    pantoprazole (PROTONIX) 40 mg tablet Take 1 Tab by mouth ACB/HS. 8/14/19  Yes Garret Kolb MD   ferric citrate (AURYXIA) 210 mg iron tablet Take 420 mg by mouth three (3) times daily (with meals). Yes Provider, Historical   carvedilol (COREG) 25 mg tablet Take 1 Tab by mouth two (2) times daily (with meals). 8/19/18  Yes Micaela Fritz MD   predniSONE (DELTASONE) 5 mg tablet Take 2 Tabs by mouth daily. 6/28/18  Yes Panfilo Sarabia MD   gemfibrozil (LOPID) 600 mg tablet Take 300 mg by mouth daily. 11/3/17  Yes Provider, Historical   azaTHIOprine (IMURAN) 50 mg tablet Take 50 mg by mouth daily (after breakfast). 11/3/17  Yes Provider, Historical          Allergies   Allergen Reactions    Compazine [Prochlorperazine Edisylate] Nausea and Vomiting and Palpitations     \"hot and lightheaded\"           ROS  See HPI    Objective:   Physical Exam  Vitals signs and nursing note reviewed. Constitutional:       Appearance: She is well-developed. Cardiovascular:      Rate and Rhythm: Normal rate and regular rhythm. Heart sounds: No murmur. No friction rub. No gallop. Pulmonary:      Effort: Pulmonary effort is normal. No respiratory distress. Breath sounds: Normal breath sounds. Skin:         Neurological:      Mental Status: She is alert and oriented to person, place, and time. Psychiatric:         Behavior: Behavior normal.         Thought Content: Thought content normal.         Judgment: Judgment normal.               Visit Vitals  BP (!) 136/94 (BP 1 Location: Left arm, BP Patient Position: Sitting)   Pulse 66   Temp 97.7 °F (36.5 °C) (Temporal)   Resp 16   Ht 5' 5\" (1.651 m)   Wt 225 lb (102.1 kg)   SpO2 97%   BMI 37.44 kg/m²       Assessment & Plan:  Diagnoses and all orders for this visit:    1.  Cellulitis of left hand  Switch to Doxycycline given continued infection. Go to ER for new or worsening symptoms  -     doxycycline (ADOXA) 100 mg tablet; Take 1 Tab by mouth two (2) times a day for 10 days. 2. Dog bite of left hand, subsequent encounter  As above. Update Tetanus. 3. Lupus nephritis ([facility]  Managed by nephrology, and rheumatology, no changes      4. Seizure [facility])  Managed by neurology, no changes      5. Malignant hypertension  Well controlled, no medication changes. 6. ESRD on hemodialysis [facility])  Managed by nephrology, no changes      7. Anemia of renal disease  Managed by nephrology, no changes      8. Encounter for immunization  -     [place] IMMUNIZ ADMIN,1 SINGLE/COMB VAC/TOXOID  -     TETANUS, DIPHTHERIA TOXOIDS AND ACELLULAR PERTUSSIS VACCINE (TDAP), IN INDIVIDS. >=7, IM    9. Medicare annual wellness visit, subsequent    10. ACP (advance care planning)      I have discussed the diagnosis with the patient and the intended plan as seen in the above orders. The patient has received an after-visit summary along with patient information handout. I have discussed medication side effects and warnings with the patient as well. Follow-up and Dispositions    · Return in about 6 months (around 5/19/2021) for disease management.            Jen Antoine NP

## 2020-12-03 ENCOUNTER — OFFICE VISIT (OUTPATIENT)
Dept: NEUROLOGY | Age: 35
End: 2020-12-03
Payer: MEDICARE

## 2020-12-03 VITALS
SYSTOLIC BLOOD PRESSURE: 128 MMHG | TEMPERATURE: 97.1 F | DIASTOLIC BLOOD PRESSURE: 88 MMHG | HEART RATE: 104 BPM | OXYGEN SATURATION: 98 % | WEIGHT: 225 LBS | BODY MASS INDEX: 37.44 KG/M2

## 2020-12-03 DIAGNOSIS — G47.00 INSOMNIA, UNSPECIFIED TYPE: Primary | ICD-10-CM

## 2020-12-03 PROCEDURE — G9231 DOC ESRD DIA TRANS PREG: HCPCS | Performed by: PSYCHIATRY & NEUROLOGY

## 2020-12-03 PROCEDURE — G8427 DOCREV CUR MEDS BY ELIG CLIN: HCPCS | Performed by: PSYCHIATRY & NEUROLOGY

## 2020-12-03 PROCEDURE — 99214 OFFICE O/P EST MOD 30 MIN: CPT | Performed by: PSYCHIATRY & NEUROLOGY

## 2020-12-03 PROCEDURE — 1111F DSCHRG MED/CURRENT MED MERGE: CPT | Performed by: PSYCHIATRY & NEUROLOGY

## 2020-12-03 PROCEDURE — G8417 CALC BMI ABV UP PARAM F/U: HCPCS | Performed by: PSYCHIATRY & NEUROLOGY

## 2020-12-03 PROCEDURE — G8432 DEP SCR NOT DOC, RNG: HCPCS | Performed by: PSYCHIATRY & NEUROLOGY

## 2020-12-03 RX ORDER — AMITRIPTYLINE HYDROCHLORIDE 150 MG/1
150 TABLET, FILM COATED ORAL
Qty: 30 TAB | Refills: 5 | Status: SHIPPED | OUTPATIENT
Start: 2020-12-03 | End: 2021-01-02

## 2020-12-03 NOTE — PROGRESS NOTES
Neurology Progress Note    Patient ID:   Quinn Carrillo  [id number]  57 y.o.  1985  PCP: Yoselin Benítez NP    Date of Office Visit: 12/3/2020    CC: medication refill    Interval Hx:     Take Amitriptyline 50 mg three tabs QHS  Helping her sleep, denies daytime side effect  Requests 150 mg tablet so she can take fewer pills    Separately, she reports she is now back on the kidney transplant list  Continues doing dialysis for now    Brief ROS: as noted above or otherwise negative       Objective: Allergies: Allergies   Allergen Reactions    Compazine [Prochlorperazine Edisylate] Nausea and Vomiting and Palpitations     \"hot and lightheaded\"       Meds:     Current Outpatient Medications   Medication Sig    amitriptyline (ELAVIL) 150 mg tablet Take 1 Tab by mouth nightly for 30 days. Antidepressant to help insomnia.  amoxicillin-clavulanate (Augmentin) 500-125 mg per tablet Take 1 Tab by mouth every twelve (12) hours. Indications: an infection of the skin and the tissue below the skin    ondansetron (Zofran ODT) 4 mg disintegrating tablet Take 1 Tab by mouth every eight (8) hours as needed for Nausea.  pantoprazole (PROTONIX) 40 mg tablet Take 1 Tab by mouth ACB/HS.  ferric citrate (AURYXIA) 210 mg iron tablet Take 420 mg by mouth three (3) times daily (with meals).  carvedilol (COREG) 25 mg tablet Take 1 Tab by mouth two (2) times daily (with meals).  predniSONE (DELTASONE) 5 mg tablet Take 2 Tabs by mouth daily.  gemfibrozil (LOPID) 600 mg tablet Take 300 mg by mouth daily.  azaTHIOprine (IMURAN) 50 mg tablet Take 50 mg by mouth daily (after breakfast).        PMHx:   Past Medical History:   Diagnosis Date    Anemia     secondary to lupus    Asthma     no inhaler use in past 2 to 3 years    Carditis     Chronic kidney disease     ESRD    Chronic pain     DDD (degenerative disc disease), lumbar     ESRD (end stage renal disease) (HCC)     GERD (gastroesophageal reflux disease)     HCAP (healthcare-associated pneumonia) 2019    Hemodialysis patient [facility]) 2017    [address]  Tuesday,  Thursday,  and Saturday.  Hypercholesterolemia     Hyperkalemia 3/4/2019    Hypertension     Hypokalemia 10/27/2017    [facility]-abdominal abscess ([facility] 2018    Intractable nausea and vomiting 10/21/2015    Long term (current) use of anticoagulants     Lupus (systemic lupus erythematosus) (HCC)     Malignant hypertension with chronic kidney disease stage V ([address]     Peritoneal dialysis status ([address] 10/2015    x 2 years Stopped 2017 due to infection and removed.  Peritonitis ([address] 3/11/2018    Pneumonia 3/14/2020    Poor historian 2018    With medications    Sepsis ([address] 2018    Thromboembolus ([address]     lungs    Transfusion history     Last Transfusion 2017  at [facility]       PSHx:  has a past surgical history that includes hx  section (2006); hx other surgical (9/16/15); hx vascular access (Right, 2017); and hx vascular access (Right, ). SocHx:  reports that she has never smoked. She has never used smokeless tobacco. She reports current drug use. Drugs: Prescription and OTC. She reports that she does not drink alcohol. FHx: family history includes Cancer in an other family member; Diabetes in her father and mother; Hypertension in her father. PHYSICAL EXAM    Vitals:    20 1129   BP: 128/88   BP 1 Location: Left arm   BP Patient Position: Sitting   Pulse: (!) 104   Temp: 97.1 °F (36.2 °C)   SpO2: 98%   Weight: 102.1 kg (225 lb)       General: awake, alert, conversant. NAD. Exts: swelling/ abrasion/ bite fahad on left hand (pt reports bitten by dog, had this evaluated in hospital)     Neuro Exam:  Face symmetric, EOMI, VF normal bilaterally, 4+/5 strength in all exts, mildly antalgic gait. DTRs/ sensory deferred. Impression/ Plan:       ICD-10-CM ICD-9-CM    1.  Insomnia, unspecified type  G47.00 780.52 amitriptyline (ELAVIL) 150 mg tablet     Changed amitriptyline to one 150 mg tablet, once at bedtime  Sent 30 day Rx + 5 RFs  F/u in 6 months      Signed By: Stas Bryson MD     December 3, 2020

## 2020-12-09 ENCOUNTER — PATIENT OUTREACH (OUTPATIENT)
Dept: CASE MANAGEMENT | Age: 35
End: 2020-12-09

## 2020-12-09 NOTE — PROGRESS NOTES
Called patient on cell and home phone numbers- not able to LM on either phone. Message sent to her via [employer] requesting that she call CTN back. Called patient again on 12/10- spoke with her. Goals      Patient/Family verbalizes understanding of self-management of chronic disease. 1/14/2020  · Outreach to patient to complete ACM assessment for chronic disease management-admits to feeling good  · Patient verbalized understanding of why referred to care management services and reason for call  · ACM explored barriers that might be impacting patient's ability to participate in care management- following up with pain management for chronic pain  · Patient admits to takng all medications as ordered, Medication review completed. · Denies constipation, including fiber in meals  · Plans follow up with Neurology on 6/4/20  · Denies financial, social or behavioral barriers  · Reminded of scheduled follow-up appointment for overdue health maintenance topics, patient will schedule due to her dialysis and transportation. · Try to limit how much sodium you eat to less than 2,300 milligrams (mg) a day. Your doctor may ask you to try to eat less than 1,500 mg a day. · Eat plenty of fruits (such as bananas and oranges), vegetables, legumes, whole grains, and low-fat dairy products. · Lower the amount of saturated fat in your diet. Saturated fat is found in animal products such as milk, cheese, and meat. Limiting these foods may help you lose weight and also lower your risk for heart disease. Plan for Next Visit:  1. Assess compliance with medications and understanding  2. Assess understanding of plan of care and health-seeking behavior changes  3.  Assess need for education and barriers to care  Evaluation:   ACM To follow up in seven-ten days   Patient given contact information for ACM, and office number for twenty-four hour on call coverage ([phone]) pk.    2/7/2020  · ACM outreach to complete assessment of HTN, CRD, and self management  · Admits to feeling good  · Continue Dialysis  schedule as Haylie Jackson, Sat. · Has schedule follow up with Dr. Ligia Gold scheduled on 6/4/2020  · Follows up with renal during dialysis weekly  · BP ranges 130s-140s, encouraged  To keep a log  · Reminded of importance of PCP follow up, decline at this time  · Denies financial, social or behavioral barriers  · ACM will follow in 7-10 days. pk       Prevent complications post hospitalization. 11/16/20 [facility] 11/12-11/14 Left hand cellullitis/ dog bite   Called patient and reviewed discharge instructions   Reviewed meds- education on new meds.  Reviewed red flags: increased redness/swelling/pain at site of wound, fever,nausea,vomiting,diarrhea,.  SURJIT with pcp- patient at dialysis, will call when completed.  Saw neph, Dr.Deep Christin Clancy, this am at HD.  Given info on Nagi Ibanez as resource.  Given CTN contact info if any questions/concerns.  Advised CTN to check back in about a week, sooner [email or web address]  12/10/20  Called to check on patient. Says hand is much better. Healing well, only slight swelling noted. Has been following up with her specialists. Advised to continue to f/u as recommended. Call if any questions/concerns. mbt

## 2021-01-17 ENCOUNTER — APPOINTMENT (OUTPATIENT)
Dept: ULTRASOUND IMAGING | Age: 36
End: 2021-01-17
Attending: EMERGENCY MEDICINE
Payer: MEDICARE

## 2021-01-17 ENCOUNTER — APPOINTMENT (OUTPATIENT)
Dept: CT IMAGING | Age: 36
End: 2021-01-17
Attending: EMERGENCY MEDICINE
Payer: MEDICARE

## 2021-01-17 ENCOUNTER — HOSPITAL ENCOUNTER (EMERGENCY)
Age: 36
Discharge: HOME | End: 2021-01-17
Attending: EMERGENCY MEDICINE
Payer: MEDICARE

## 2021-01-17 VITALS
BODY MASS INDEX: 37.75 KG/M2 | HEART RATE: 99 BPM | TEMPERATURE: 98.9 F | OXYGEN SATURATION: 94 % | WEIGHT: 226.85 LBS | DIASTOLIC BLOOD PRESSURE: 81 MMHG | RESPIRATION RATE: 15 BRPM | SYSTOLIC BLOOD PRESSURE: 121 MMHG

## 2021-01-17 DIAGNOSIS — E87.5 HYPERKALEMIA: ICD-10-CM

## 2021-01-17 DIAGNOSIS — N83.202 CYST OF LEFT OVARY: ICD-10-CM

## 2021-01-17 DIAGNOSIS — R10.84 ABDOMINAL PAIN, GENERALIZED: Primary | ICD-10-CM

## 2021-01-17 DIAGNOSIS — Z99.2 DIALYSIS PATIENT (HCC): ICD-10-CM

## 2021-01-17 LAB
ALBUMIN SERPL-MCNC: 3.4 G/DL (ref 3.5–5)
ALBUMIN/GLOB SERPL: 0.6 {RATIO} (ref 1.1–2.2)
ALP SERPL-CCNC: 82 U/L (ref 45–117)
ALT SERPL-CCNC: 37 U/L (ref 12–78)
ANION GAP SERPL CALC-SCNC: 10 MMOL/L (ref 5–15)
ANION GAP SERPL CALC-SCNC: 11 MMOL/L (ref 5–15)
AST SERPL-CCNC: 74 U/L (ref 15–37)
BASOPHILS # BLD: 0 K/UL (ref 0–0.1)
BASOPHILS NFR BLD: 0 % (ref 0–1)
BILIRUB SERPL-MCNC: 0.6 MG/DL (ref 0.2–1)
BUN SERPL-MCNC: 69 MG/DL (ref 6–20)
BUN SERPL-MCNC: 71 MG/DL (ref 6–20)
BUN/CREAT SERPL: 6 (ref 12–20)
BUN/CREAT SERPL: 6 (ref 12–20)
CALCIUM SERPL-MCNC: 10 MG/DL (ref 8.5–10.1)
CALCIUM SERPL-MCNC: 9.5 MG/DL (ref 8.5–10.1)
CHLORIDE SERPL-SCNC: 94 MMOL/L (ref 97–108)
CHLORIDE SERPL-SCNC: 96 MMOL/L (ref 97–108)
CO2 SERPL-SCNC: 32 MMOL/L (ref 21–32)
CO2 SERPL-SCNC: 32 MMOL/L (ref 21–32)
CREAT SERPL-MCNC: 11.49 MG/DL (ref 0.55–1.02)
CREAT SERPL-MCNC: 11.54 MG/DL (ref 0.55–1.02)
DIFFERENTIAL METHOD BLD: ABNORMAL
EOSINOPHIL # BLD: 0.1 K/UL (ref 0–0.4)
EOSINOPHIL NFR BLD: 1 % (ref 0–7)
ERYTHROCYTE [DISTWIDTH] IN BLOOD BY AUTOMATED COUNT: 14.3 % (ref 11.5–14.5)
GLOBULIN SER CALC-MCNC: 5.3 G/DL (ref 2–4)
GLUCOSE SERPL-MCNC: 96 MG/DL (ref 65–100)
GLUCOSE SERPL-MCNC: 96 MG/DL (ref 65–100)
HCG SERPL QL: NEGATIVE
HCT VFR BLD AUTO: 41.8 % (ref 35–47)
HGB BLD-MCNC: 12.9 G/DL (ref 11.5–16)
IMM GRANULOCYTES # BLD AUTO: 0 K/UL (ref 0–0.04)
IMM GRANULOCYTES NFR BLD AUTO: 0 % (ref 0–0.5)
LACTATE SERPL-SCNC: 1.2 MMOL/L (ref 0.4–2)
LIPASE SERPL-CCNC: 287 U/L (ref 73–393)
LYMPHOCYTES # BLD: 0.6 K/UL (ref 0.8–3.5)
LYMPHOCYTES NFR BLD: 10 % (ref 12–49)
MCH RBC QN AUTO: 30.1 PG (ref 26–34)
MCHC RBC AUTO-ENTMCNC: 30.9 G/DL (ref 30–36.5)
MCV RBC AUTO: 97.4 FL (ref 80–99)
MONOCYTES # BLD: 0.3 K/UL (ref 0–1)
MONOCYTES NFR BLD: 5 % (ref 5–13)
NEUTS SEG # BLD: 5.2 K/UL (ref 1.8–8)
NEUTS SEG NFR BLD: 84 % (ref 32–75)
NRBC # BLD: 0 K/UL (ref 0–0.01)
NRBC BLD-RTO: 0 PER 100 WBC
PLATELET # BLD AUTO: 215 K/UL (ref 150–400)
PMV BLD AUTO: 11.2 FL (ref 8.9–12.9)
POTASSIUM SERPL-SCNC: 6.5 MMOL/L (ref 3.5–5.1)
POTASSIUM SERPL-SCNC: 6.7 MMOL/L (ref 3.5–5.1)
PROT SERPL-MCNC: 8.7 G/DL (ref 6.4–8.2)
RBC # BLD AUTO: 4.29 M/UL (ref 3.8–5.2)
RBC MORPH BLD: ABNORMAL
SODIUM SERPL-SCNC: 137 MMOL/L (ref 136–145)
SODIUM SERPL-SCNC: 138 MMOL/L (ref 136–145)
WBC # BLD AUTO: 6.2 K/UL (ref 3.6–11)

## 2021-01-17 PROCEDURE — 99285 EMERGENCY DEPT VISIT HI MDM: CPT

## 2021-01-17 PROCEDURE — 96376 TX/PRO/DX INJ SAME DRUG ADON: CPT

## 2021-01-17 PROCEDURE — 85025 COMPLETE CBC W/AUTO DIFF WBC: CPT

## 2021-01-17 PROCEDURE — 83605 ASSAY OF LACTIC ACID: CPT

## 2021-01-17 PROCEDURE — 93005 ELECTROCARDIOGRAM TRACING: CPT

## 2021-01-17 PROCEDURE — 74011636637 HC RX REV CODE- 636/637: Performed by: EMERGENCY MEDICINE

## 2021-01-17 PROCEDURE — 84703 CHORIONIC GONADOTROPIN ASSAY: CPT

## 2021-01-17 PROCEDURE — 96375 TX/PRO/DX INJ NEW DRUG ADDON: CPT

## 2021-01-17 PROCEDURE — 74011250636 HC RX REV CODE- 250/636: Performed by: EMERGENCY MEDICINE

## 2021-01-17 PROCEDURE — 76830 TRANSVAGINAL US NON-OB: CPT

## 2021-01-17 PROCEDURE — 36415 COLL VENOUS BLD VENIPUNCTURE: CPT

## 2021-01-17 PROCEDURE — 74011000258 HC RX REV CODE- 258: Performed by: EMERGENCY MEDICINE

## 2021-01-17 PROCEDURE — 83690 ASSAY OF LIPASE: CPT

## 2021-01-17 PROCEDURE — 80053 COMPREHEN METABOLIC PANEL: CPT

## 2021-01-17 PROCEDURE — 76856 US EXAM PELVIC COMPLETE: CPT

## 2021-01-17 PROCEDURE — 74011000250 HC RX REV CODE- 250: Performed by: EMERGENCY MEDICINE

## 2021-01-17 PROCEDURE — 74176 CT ABD & PELVIS W/O CONTRAST: CPT

## 2021-01-17 PROCEDURE — 96365 THER/PROPH/DIAG IV INF INIT: CPT

## 2021-01-17 PROCEDURE — 74011250637 HC RX REV CODE- 250/637: Performed by: EMERGENCY MEDICINE

## 2021-01-17 RX ORDER — ONDANSETRON 4 MG/1
4 TABLET, ORALLY DISINTEGRATING ORAL
Status: COMPLETED | OUTPATIENT
Start: 2021-01-17 | End: 2021-01-17

## 2021-01-17 RX ORDER — ONDANSETRON 2 MG/ML
4 INJECTION INTRAMUSCULAR; INTRAVENOUS ONCE
Status: DISCONTINUED | OUTPATIENT
Start: 2021-01-17 | End: 2021-01-17

## 2021-01-17 RX ORDER — HYDROMORPHONE HYDROCHLORIDE 1 MG/ML
0.5 INJECTION, SOLUTION INTRAMUSCULAR; INTRAVENOUS; SUBCUTANEOUS ONCE
Status: DISCONTINUED | OUTPATIENT
Start: 2021-01-17 | End: 2021-01-17

## 2021-01-17 RX ORDER — HYDROMORPHONE HYDROCHLORIDE 1 MG/ML
0.5 INJECTION, SOLUTION INTRAMUSCULAR; INTRAVENOUS; SUBCUTANEOUS
Status: COMPLETED | OUTPATIENT
Start: 2021-01-17 | End: 2021-01-17

## 2021-01-17 RX ORDER — SORBITOL SOLUTION 70 %
30 SOLUTION, ORAL MISCELLANEOUS
Status: DISCONTINUED | OUTPATIENT
Start: 2021-01-17 | End: 2021-01-17

## 2021-01-17 RX ORDER — HYDROMORPHONE HYDROCHLORIDE 1 MG/ML
0.5 INJECTION, SOLUTION INTRAMUSCULAR; INTRAVENOUS; SUBCUTANEOUS ONCE
Status: COMPLETED | OUTPATIENT
Start: 2021-01-17 | End: 2021-01-17

## 2021-01-17 RX ORDER — SODIUM POLYSTYRENE SULFONATE 15 G/60ML
60 SUSPENSION ORAL; RECTAL
Status: COMPLETED | OUTPATIENT
Start: 2021-01-17 | End: 2021-01-17

## 2021-01-17 RX ORDER — DEXTROSE 50 % IN WATER (D50W) INTRAVENOUS SYRINGE
25 ONCE
Status: COMPLETED | OUTPATIENT
Start: 2021-01-17 | End: 2021-01-17

## 2021-01-17 RX ORDER — HYDROMORPHONE HYDROCHLORIDE 1 MG/ML
0.5 INJECTION, SOLUTION INTRAMUSCULAR; INTRAVENOUS; SUBCUTANEOUS
Status: DISCONTINUED | OUTPATIENT
Start: 2021-01-17 | End: 2021-01-17

## 2021-01-17 RX ADMIN — SODIUM POLYSTYRENE SULFONATE 60 G: 15 SUSPENSION ORAL; RECTAL at 21:34

## 2021-01-17 RX ADMIN — HYDROMORPHONE HYDROCHLORIDE 0.5 MG: 1 INJECTION, SOLUTION INTRAMUSCULAR; INTRAVENOUS; SUBCUTANEOUS at 19:01

## 2021-01-17 RX ADMIN — CALCIUM GLUCONATE 1 G: 94 INJECTION, SOLUTION INTRAVENOUS at 18:38

## 2021-01-17 RX ADMIN — ONDANSETRON 4 MG: 4 TABLET, ORALLY DISINTEGRATING ORAL at 17:17

## 2021-01-17 RX ADMIN — HUMAN INSULIN 5 UNITS: 100 INJECTION, SOLUTION SUBCUTANEOUS at 19:06

## 2021-01-17 RX ADMIN — HYDROMORPHONE HYDROCHLORIDE 0.5 MG: 1 INJECTION, SOLUTION INTRAMUSCULAR; INTRAVENOUS; SUBCUTANEOUS at 17:17

## 2021-01-17 RX ADMIN — DEXTROSE MONOHYDRATE 25 G: 25 INJECTION, SOLUTION INTRAVENOUS at 19:03

## 2021-01-17 NOTE — ED TRIAGE NOTES
Triage Note: Patient arrives to ER complaining of mid abdominal pain that started today. +vomiting x 2 episodes.

## 2021-01-18 LAB
ATRIAL RATE: 97 BPM
CALCULATED P AXIS, ECG09: 60 DEGREES
CALCULATED R AXIS, ECG10: 71 DEGREES
CALCULATED T AXIS, ECG11: 61 DEGREES
DIAGNOSIS, 93000: NORMAL
P-R INTERVAL, ECG05: 180 MS
Q-T INTERVAL, ECG07: 360 MS
QRS DURATION, ECG06: 90 MS
QTC CALCULATION (BEZET), ECG08: 457 MS
VENTRICULAR RATE, ECG03: 97 BPM

## 2021-01-18 NOTE — ED NOTES
7:15 PM  Change of shift. Care of patient taken over from Dr Didier Salazar; H&P reviewed, bedside handoff complete. 'Awaiting US results/ admin kayexalate and plan to have dialysis tomorrow'; Pt at [address] now;     7:54 PM  Laz Julien  results have been reviewed with her. She has been counseled regarding her diagnosis. She verbally conveys understanding and agreement of the signs, symptoms, diagnosis, treatment and prognosis and additionally agrees to Call/ Arrange follow up as recommended with OB/GYN/ Dr. Emilie Owens NP in 24 - 48 hours. She also agrees with the care-plan and conveys that all of her questions have been answered. I have also put together some discharge instructions for her that include: 1) educational information regarding their diagnosis, 2) how to care for their diagnosis at home, as well a 3) list of reasons why they would want to return to the ED prior to their follow-up appointment, should their condition change or for concerns.

## 2021-03-02 ENCOUNTER — TRANSCRIBE ORDER (OUTPATIENT)
Dept: SCHEDULING | Age: 36
End: 2021-03-02

## 2021-03-02 DIAGNOSIS — N83.291 COMPLEX CYST OF BOTH OVARIES: Primary | ICD-10-CM

## 2021-03-02 DIAGNOSIS — N83.292 COMPLEX CYST OF BOTH OVARIES: Primary | ICD-10-CM

## 2021-03-04 ENCOUNTER — TELEPHONE (OUTPATIENT)
Dept: NEUROLOGY | Age: 36
End: 2021-03-04

## 2021-03-04 DIAGNOSIS — G47.00 INSOMNIA, UNSPECIFIED TYPE: Primary | ICD-10-CM

## 2021-03-04 NOTE — TELEPHONE ENCOUNTER
----- Message from Tiffani Pearce sent at 3/4/2021  1:35 PM EST -----  Regarding: Dr. Laverne Sy (if not patient): Pt      Relationship of caller (if not patient): Self      Best contact number(s): [phone]      Name of medication and dosage if known: amitriptyline (ELAVIL) 150 mg tablet       Is patient out of this medication (yes/no): yes      Pharmacy name: [facility] listed in chart? (yes/no): Yes  Pharmacy phone number:  [phone]        Details to clarify the request: Pt is requesting a refill of the above medication. I did advise pt that we legally have 48 business hours to refill a prescription, but she would like this to be sent in as soon as possible. Please advise.       Tiffani Pearce

## 2021-03-05 ENCOUNTER — TRANSCRIBE ORDER (OUTPATIENT)
Dept: SCHEDULING | Age: 36
End: 2021-03-05

## 2021-03-05 DIAGNOSIS — N83.292 COMPLEX CYST OF LEFT OVARY: Primary | ICD-10-CM

## 2021-03-06 ENCOUNTER — HOSPITAL ENCOUNTER (OUTPATIENT)
Dept: CT IMAGING | Age: 36
Discharge: HOME | End: 2021-03-06
Payer: MEDICARE

## 2021-03-06 DIAGNOSIS — N83.292 COMPLEX CYST OF LEFT OVARY: ICD-10-CM

## 2021-03-06 PROCEDURE — 74011000636 HC RX REV CODE- 636

## 2021-03-06 PROCEDURE — 74177 CT ABD & PELVIS W/CONTRAST: CPT

## 2021-03-06 PROCEDURE — 74011000250 HC RX REV CODE- 250

## 2021-03-06 RX ORDER — SODIUM CHLORIDE 0.9 % (FLUSH) 0.9 %
10 SYRINGE (ML) INJECTION
Status: COMPLETED | OUTPATIENT
Start: 2021-03-06 | End: 2021-03-06

## 2021-03-06 RX ORDER — BARIUM SULFATE 20 MG/ML
450 SUSPENSION ORAL
Status: COMPLETED | OUTPATIENT
Start: 2021-03-06 | End: 2021-03-06

## 2021-03-06 RX ADMIN — Medication 10 ML: at 11:25

## 2021-03-06 RX ADMIN — IOPAMIDOL 100 ML: 755 INJECTION, SOLUTION INTRAVENOUS at 11:25

## 2021-03-06 RX ADMIN — BARIUM SULFATE 900 ML: 20 SUSPENSION ORAL at 11:25

## 2021-03-07 RX ORDER — AMITRIPTYLINE HYDROCHLORIDE 150 MG/1
150 TABLET, FILM COATED ORAL
Qty: 90 TAB | Refills: 0 | Status: SHIPPED | OUTPATIENT
Start: 2021-03-07 | End: 2021-04-19

## 2021-03-08 ENCOUNTER — TRANSCRIBE ORDER (OUTPATIENT)
Dept: SCHEDULING | Age: 36
End: 2021-03-08

## 2021-03-08 DIAGNOSIS — N83.292 COMPLEX CYST OF LEFT OVARY: Primary | ICD-10-CM

## 2021-03-08 NOTE — PROGRESS NOTES
New Patient, Referred by Dr. Caryle Hikes for ovarian cyst, seen in ER on 1/17/2021 for abdominal pain, pt states she has had this pain for one year, it comes and goes, it is dull, and in her lower abdomen, she does have an IUD that was placed in 6/2020    Vitals:    03/09/21 1351 03/09/21 1400   BP: (!) 147/110 (!) 138/101   BP 1 Location: Left upper arm Left upper arm   BP Patient Position: Sitting Sitting   Pulse: 89 86   Temp: (!) 96.4 °F (35.8 °C)    TempSrc: Temporal    Weight: 230 lb 9.6 oz (104.6 kg)    Height: 5' 5\" (1.651 m)          1. Have you been to the ER, urgent care clinic since your last visit? Hospitalized since your last visit? Yes, to ER on 1/17/2021    2. Have you seen or consulted any other health care providers outside of the [address] [place] [address] since your last visit? Include any pap smears or colon screening.    no

## 2021-03-09 ENCOUNTER — OFFICE VISIT (OUTPATIENT)
Dept: GYNECOLOGY | Age: 36
End: 2021-03-09
Payer: MEDICARE

## 2021-03-09 VITALS
HEIGHT: 65 IN | WEIGHT: 230.6 LBS | HEART RATE: 86 BPM | TEMPERATURE: 96.4 F | DIASTOLIC BLOOD PRESSURE: 101 MMHG | SYSTOLIC BLOOD PRESSURE: 138 MMHG | BODY MASS INDEX: 38.42 KG/M2

## 2021-03-09 DIAGNOSIS — Z99.2 ESRD ON HEMODIALYSIS (HCC): ICD-10-CM

## 2021-03-09 DIAGNOSIS — N18.6 ESRD ON HEMODIALYSIS (HCC): ICD-10-CM

## 2021-03-09 DIAGNOSIS — Z86.711 HISTORY OF PULMONARY EMBOLISM: ICD-10-CM

## 2021-03-09 DIAGNOSIS — R19.00 PELVIC MASS: Primary | ICD-10-CM

## 2021-03-09 DIAGNOSIS — M32.14 LUPUS NEPHRITIS (HCC): ICD-10-CM

## 2021-03-09 PROCEDURE — G0463 HOSPITAL OUTPT CLINIC VISIT: HCPCS | Performed by: OBSTETRICS & GYNECOLOGY

## 2021-03-09 PROCEDURE — G8417 CALC BMI ABV UP PARAM F/U: HCPCS | Performed by: OBSTETRICS & GYNECOLOGY

## 2021-03-09 PROCEDURE — G9231 DOC ESRD DIA TRANS PREG: HCPCS | Performed by: OBSTETRICS & GYNECOLOGY

## 2021-03-09 PROCEDURE — 99205 OFFICE O/P NEW HI 60 MIN: CPT | Performed by: OBSTETRICS & GYNECOLOGY

## 2021-03-09 PROCEDURE — G8432 DEP SCR NOT DOC, RNG: HCPCS | Performed by: OBSTETRICS & GYNECOLOGY

## 2021-03-09 PROCEDURE — G8427 DOCREV CUR MEDS BY ELIG CLIN: HCPCS | Performed by: OBSTETRICS & GYNECOLOGY

## 2021-03-09 RX ORDER — TRAMADOL HYDROCHLORIDE 50 MG/1
50 TABLET ORAL
Qty: 30 TAB | Refills: 0 | Status: SHIPPED | OUTPATIENT
Start: 2021-03-09 | End: 2021-04-12

## 2021-03-09 NOTE — LETTER
3/16/2021 Patient: Rogelio Guy YOB: 1985 Date of Visit: 3/9/2021 Carlita Wilks NP 
[address] [id number] [address] In [employer] Elizabeth Cid NP 
[address] [facility] [address] [address] [address] Fax: [phone] Dear KHANH Cortes NP, Thank you for referring Ms. Robert Ponce to [facility] for evaluation. My notes for this consultation are attached. If you have questions, please do not hesitate to call me. I look forward to following your patient along with you.  
 
 
Sincerely, 
 
Marilyn Levine MD

## 2021-03-09 NOTE — PROGRESS NOTES
[address] [place] [address] [address]ias [address], [address] [facility]  P [phone]  F [phone]    Office Note  Patient ID:  Name:  Ellen Ervin  MRN:  [id number]  :  1985/36 y.o. Date:  3/16/2021      HISTORY OF PRESENT ILLNESS:  Ms. Ellen Ervin is a 29 y.o.  premenopausal female who presents as a new patient from Dr. Gloria Fleming and Lupe Benson NP for a complex left ovarian cyst.     The patient has ESRD and is on hemodialysis on Tues/Thurs/Sat, which is secondary to Lupus. The patient reports that she is on the list for a kidney transplant. She had a Mirena IUD placed in 2020 secondary to abnormal uterine bleeding. She has experienced continuous spotting since that time. Patient reports a long history of ovarian cysts for which she has been seen in the ER multiple times. They are not currently bothering her. She has intermittent pain in her pelvis. Denies change in appetite or bowel habits. The patient presented to Lupe Benson NP and her Mirena IUD strings were not visualized and a pelvic ultrasound was ordered. Pelvic ultrasound on 2021 demonstrated an \"abnormal, enlarged left ovary, 8.5cm x 7.5 x 7.3cm\". The patient was subsequently referred to our office for further discussion and management. Ca-125 on 2021 was 10.3    She is followed at [facility] in regard to her ESRD and need for kidney transplant. Patient has a history of prior peritoneal dialysis, although this was complicated by an intra-abdominal abscess/infection that required drainage. Pertinent PMH/PSH: SLE, ESRD on hemodialysis, on list for kidney transplant, HTN      Active, no restrictions. Imaging Review:   CT A/P 3/6/2021:   FINDINGS:  LUNG BASES:No mass lesion or effusion. LIVER: No mass or biliary dilatation. There is no intrahepatic duct dilatation. There is no hepatic parenchymal mass. Hepatic enhancement pattern is within  normal limits.  Portal vein is patent. GALLBLADDER:  No dilatation or wall thickening. SPLEEN/PANCREAS: No mass. There is no pancreatic duct dilatation. There is no  evidence of splenomegaly. ADRENALS/KIDNEYS: Renal atrophy. Patient has a history of end-stage renal  disease. There is no hydronephrosis. There is no renal mass. There is no  perinephric mass. STOMACH: No dilatation or wall thickening. COLON AND SMALL BOWEL: Fecal stasis There is no free intraperitoneal air. There  is no evidence of incarceration or obstruction. No mesenteric adenopathy. PERITONEUM: Unremarkable. APPENDIX: Unremarkable. BLADDER/REPRODUCTIVE ORGANS: There are bilateral adnexal cystic lesions as well  as a cystic lesion anterior and superior to the uterus; conglomerate measurement  is approximately 6.9 cm by 5.4 cm. There is no free intraperitoneal gas or  fluid. There is no focal fluid collection to suggest abscess. IUD is present  within the uterus. IMPRESSION  Stable to slightly diminished left greater than right adnexal cysts. No  associated free fluid. Patient has had adnexal cyst visualized on CT scans going  back 2 2020. Renal atrophy and a patient with a history of end-stage renal disease. Imaging follow-up as clinically warranted    Pelvic ultrasound 2/25/2021:  Impression: Normal uterus with intrauterine device visualized in the appropriate intrauterine location. Left ovary enlarged with 8cm complex left ovarian cyst. Right adnexa to midline with large fluid collection vs right ovarian cyst; however no clearly definable right ovarian tissue noted. ROS:  A comprehensive review of systems was negative except for that written in the History of Present Illness. , 10 point ROS    OB/GYN ROS:  Patient denies significant menstrual problems.     ECOG ndGndrndanddndend:nd nd2nd Problem List:  Patient Active Problem List    Diagnosis Date Noted    Severe obesity ([address] 11/19/2020    Seizure ([address] 10/18/2019    ESRD on hemodialysis ([address] 08/27/2018    Other constipation 2018    History of pulmonary embolism 2017    Anemia of renal disease 2017    ACP (advance care planning) 2017    Malignant hypertension 2016    Lupus nephritis ([address] 03/10/2012     PMH:  Past Medical History:   Diagnosis Date    Anemia     secondary to lupus    Asthma     no inhaler use in past 2 to 3 years    Carditis     Chronic kidney disease     ESRD    Chronic pain     DDD (degenerative disc disease), lumbar     ESRD (end stage renal disease) ([address]     GERD (gastroesophageal reflux disease)     HCAP (healthcare-associated pneumonia) 2019    Hemodialysis patient ([address] 2017    [address]  Tuesday,  Thursday,  and Saturday. 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  Peritonitis ([address] 3/11/2018    Pneumonia 3/14/2020    Poor historian 2018    With medications    Sepsis ([address] 2018    Thromboembolus ([address] 2013    lungs    Transfusion history     Last Transfusion 2017  at [address] [place] [address] [place]      [address] [place] [address] Yelena:  Past Surgical History:   Procedure Laterality Date    HX  SECTION  11/2006    x1    HX OTHER SURGICAL  9/16/15    INSERTION PD CATH;  Removed 2017    HX VASCULAR ACCESS Right 2017    Double-Lumen henry catheter upper chest    HX VASCULAR ACCESS Right 2018    right upper arm      Social History:  Social History     Tobacco Use    Smoking status: Never Smoker    Smokeless tobacco: Never Used   Substance Use Topics    Alcohol use: No      Family History:  Family History   Problem Relation Age of Onset    Diabetes Father     Hypertension Father     Cancer Other         aunt with breast cancerX2    Diabetes Mother       Medications: (reviewed)  Current Outpatient Medications   Medication Sig    traMADoL (ULTRAM) 50 mg tablet Take 1 Tab by mouth every six (6) hours as needed for Pain for up to 14 days. Max Daily Amount: 200 mg.    amitriptyline (ELAVIL) 150 mg tablet Take 1 Tab by mouth nightly for 90 days. Anti-depressant to help sleep    ondansetron (Zofran ODT) 4 mg disintegrating tablet Take 1 Tab by mouth every eight (8) hours as needed for Nausea.  pantoprazole (PROTONIX) 40 mg tablet Take 1 Tab by mouth ACB/HS.  ferric citrate (AURYXIA) 210 mg iron tablet Take 420 mg by mouth three (3) times daily (with meals).  carvedilol (COREG) 25 mg tablet Take 1 Tab by mouth two (2) times daily (with meals).  predniSONE (DELTASONE) 5 mg tablet Take 2 Tabs by mouth daily.  gemfibrozil (LOPID) 600 mg tablet Take 300 mg by mouth daily.  azaTHIOprine (IMURAN) 50 mg tablet Take 50 mg by mouth daily (after breakfast).  hydroxychloroquine sulfate (PLAQUENIL PO) Plaquenil     No current facility-administered medications for this visit. Allergies: (reviewed)  Allergies   Allergen Reactions    Compazine [Prochlorperazine Edisylate] Nausea and Vomiting and Palpitations     \"hot and lightheaded\"        Gyn History:   Last pap: ASCUS 2/9/2020, HR HPV negative  Contraception: Mirena IUD 8/2020    OBJECTIVE:    Physical Exam:  VITAL SIGNS: Vitals:    03/09/21 1351 03/09/21 1400   BP: (!) 147/110 (!) 138/101   Pulse: 89 86   Temp: (!) 96.4 °F (35.8 °C)    TempSrc: Temporal    Weight: 230 lb 9.6 oz (104.6 kg)    Height: 5' 5\" (1.651 m)      Body mass index is 38.37 kg/m². GENERAL ELTON: Conversant, alert, oriented. No acute distress. HEENT: HEENT. No thyroid enlargement. No JVD. Neck: Supple without restrictions. RESPIRATORY: Clear to auscultation and percussion to the bases. No CVAT. CARDIOVASC: RRR without murmur/rub. GASTROINT: soft, non-tender, without masses or organomegaly   MUSCULOSKEL: no joint tenderness, deformity or swelling       EXTREMITIES: extremities normal, atraumatic, no cyanosis or edema   PELVIC: Exam chaperoned by nurse. Normal appearing external genitalia. On speculum exam, normal appearing vagina and cervix. On bimanual exam, the cervix and uterus are normal size and somewhat mobile. The pelvis is full, but could not discern borders of masses noted on ultrasound. No evidence of nodularity. RECTAL: deferred   MAX SURVEY: No suspicious lymphadenopathy or edema noted. NEURO: Grossly intact. No acute deficit. Lab Date as available:    Lab Results   Component Value Date/Time    WBC 6.2 01/17/2021 04:24 PM    HGB 12.9 01/17/2021 04:24 PM    HCT 41.8 01/17/2021 04:24 PM    PLATELET 769 18/92/3178 04:24 PM    MCV 97.4 01/17/2021 04:24 PM     Lab Results   Component Value Date/Time    Sodium 138 01/17/2021 05:44 PM    Potassium 6.7 (HH) 01/17/2021 05:44 PM    Chloride 96 (L) 01/17/2021 05:44 PM    CO2 32 01/17/2021 05:44 PM    Anion gap 10 01/17/2021 05:44 PM    Glucose 96 01/17/2021 05:44 PM    BUN 71 (H) 01/17/2021 05:44 PM    Creatinine 11.54 (H) 01/17/2021 05:44 PM    BUN/Creatinine ratio 6 (L) 01/17/2021 05:44 PM    GFR est AA 5 (L) 01/17/2021 05:44 PM    GFR est non-AA 4 (L) 01/17/2021 05:44 PM    Calcium 9.5 01/17/2021 05:44 PM         IMPRESSION/PLAN:    Ms. Kye Patino is a 29 y.o. female with a working diagnosis of complex left ovarian cyst, 8cm; complicated by ESRD on hemodialysis awaiting kidney transplant.  Ca-125 on 2/25/2021 10.3    Problems:     Patient Active Problem List    Diagnosis Date Noted    Severe obesity ([address] 11/19/2020    Seizure ([address] 10/18/2019    ESRD on [facility] ([facility] 08/27/2018    Other constipation 04/04/2018    History of pulmonary embolism 12/08/2017    Anemia of renal disease 02/21/2017    ACP (advance care planning) 02/21/2017    Malignant hypertension 07/11/2016    Lupus nephritis ([facility] 03/10/2012       I reviewed Ms. Reshma Delacruz course to date, including her medical records, recent studies, physical exam, and review of symptoms. The patient's ESRD on hemodialysis and awaiting a kidney transplant, along with the fact that she has had prior intra-abdominal abscess/infection related to prior peritoneal dialysis, do not make her the optimal surgical candidate. She is currently on the list awaiting a renal transplant. She does not have a donor as of yet. I have counseled the patient regarding benign, borderline, and malignant conditions. She has a history of ovarian cysts, and on a recent CT A/P 3/6/2021 the radiologist notes that her left and right ovarian cysts are stable to diminished since 2/2020. Her Ca-125 is also normal at 10.3. Overall I believe her risk of malignancy is low, likely <5%. However, I have discussed the role of surgical resection versus observation. I believe some of her cysts formation may also be simply related to peritoneal adhesions from her prior infection. I have recommended obtaining a MRI pelvis for better characterization prior to making final management decisions. She will RTC via virtual visit to discuss her MRI results. If the mass is less concerning, then I believe it is prudent to continue with observation while she awaits a renal transplant. However, if she has concerning features or continues to experience worsening pain, then we may be pressed to proceed with surgical resection, including Diagnostic laparoscopy, ROM, USO versus BSO, and hysterectomy. All questions and concerns were addressed with the patient and she is comfortable with the plan.       Defined Sensitive Document    >50% of total time allocated to visit dedicated to counseling, 60 minutes total.    Signed By: Xin Live MD     3/16/2021/7:45 AM

## 2021-03-12 ENCOUNTER — HOSPITAL ENCOUNTER (OUTPATIENT)
Dept: MRI IMAGING | Age: 36
Discharge: HOME | End: 2021-03-12
Attending: OBSTETRICS & GYNECOLOGY
Payer: MEDICARE

## 2021-03-12 VITALS — BODY MASS INDEX: 38.27 KG/M2 | WEIGHT: 230 LBS

## 2021-03-12 DIAGNOSIS — R19.00 PELVIC MASS: ICD-10-CM

## 2021-03-12 PROCEDURE — 74011250636 HC RX REV CODE- 250/636: Performed by: OBSTETRICS & GYNECOLOGY

## 2021-03-12 PROCEDURE — 72197 MRI PELVIS W/O & W/DYE: CPT

## 2021-03-12 PROCEDURE — A9575 INJ GADOTERATE MEGLUMI 0.1ML: HCPCS | Performed by: OBSTETRICS & GYNECOLOGY

## 2021-03-12 RX ORDER — GADOTERATE MEGLUMINE 376.9 MG/ML
20 INJECTION INTRAVENOUS ONCE
Status: COMPLETED | OUTPATIENT
Start: 2021-03-12 | End: 2021-03-12

## 2021-03-12 RX ADMIN — GADOTERATE MEGLUMINE 20 ML: 376.9 INJECTION INTRAVENOUS at 16:39

## 2021-03-16 PROBLEM — R19.00 PELVIC MASS: Status: ACTIVE | Noted: 2021-03-16

## 2021-03-17 ENCOUNTER — VIRTUAL VISIT (OUTPATIENT)
Dept: GYNECOLOGY | Age: 36
End: 2021-03-17
Payer: MEDICARE

## 2021-03-17 DIAGNOSIS — R19.00 PELVIC MASS: Primary | ICD-10-CM

## 2021-03-17 DIAGNOSIS — Z99.2 ESRD ON HEMODIALYSIS (HCC): ICD-10-CM

## 2021-03-17 DIAGNOSIS — M32.14 LUPUS NEPHRITIS (HCC): ICD-10-CM

## 2021-03-17 DIAGNOSIS — N18.6 ESRD ON HEMODIALYSIS (HCC): ICD-10-CM

## 2021-03-17 DIAGNOSIS — E66.01 SEVERE OBESITY (HCC): ICD-10-CM

## 2021-03-17 PROCEDURE — G8432 DEP SCR NOT DOC, RNG: HCPCS | Performed by: OBSTETRICS & GYNECOLOGY

## 2021-03-17 PROCEDURE — G0463 HOSPITAL OUTPT CLINIC VISIT: HCPCS | Performed by: OBSTETRICS & GYNECOLOGY

## 2021-03-17 PROCEDURE — G9231 DOC ESRD DIA TRANS PREG: HCPCS | Performed by: OBSTETRICS & GYNECOLOGY

## 2021-03-17 PROCEDURE — G8427 DOCREV CUR MEDS BY ELIG CLIN: HCPCS | Performed by: OBSTETRICS & GYNECOLOGY

## 2021-03-17 PROCEDURE — 99214 OFFICE O/P EST MOD 30 MIN: CPT | Performed by: OBSTETRICS & GYNECOLOGY

## 2021-03-17 NOTE — LETTER
3/18/2021 Patient: Alicja Beltre YOB: 1985 Date of Visit: 3/17/2021 Glendy Schroeder NP 
[address] [facility] [address] [address] Fax: [phone] Dear Glenyd Schroeder NP, Thank you for referring Ms. Singh Cervantes to [address] [place] [address] for evaluation. My notes for this consultation are attached. If you have questions, please do not hesitate to call me. I look forward to following your patient along with you.  
 
 
Sincerely, 
 
Angela Hand MD

## 2021-03-17 NOTE — PROGRESS NOTES
Virtual video visit, MRI results    1. Have you been to the ER, urgent care clinic since your last visit? Hospitalized since your last visit?  no    2. Have you seen or consulted any other health care providers outside of the [address] [place] [address] since your last visit? Include any pap smears or colon screening.    no

## 2021-03-17 NOTE — PROGRESS NOTES
[address] [place] [address] [address]ias [address], [address]   [phone]  F [phone]    Office Note  Patient ID:  Name:  Brock Carter  MRN:  [id number]  :  1985/36 y.o. Date:  3/17/2021      HISTORY OF PRESENT ILLNESS:  Ms. Brock Carter is a 29 y.o. female who initially present from Eliezer Butts NP for a complex pelvic mass. Her history is complicated by ESRD on hemodialysis. Prior peritoneal dialysis in  complicated by intra-abdominal abscess requiring multiple drain placements. Presents today for imaging results of her MRI pelvis. Ca-125 10.3 on 2021. Initial History:  Ms. Brock Carter is a 29 y.o.  premenopausal female who presents as a new patient from Dr. Marcy Paredes and Eliezer Butts NP for a complex left ovarian cyst.     The patient has ESRD and is on hemodialysis on Tues/Thurs/Sat, which is secondary to Lupus. The patient reports that she is on the list for a kidney transplant. She had a Mirena IUD placed in 2020 secondary to abnormal uterine bleeding. She has experienced continuous spotting since that time. Patient reports a long history of ovarian cysts for which she has been seen in the ER multiple times. They are not currently bothering her. She has intermittent pain in her pelvis. Denies change in appetite or bowel habits. The patient presented to Eliezer Butts NP and her Mirena IUD strings were not visualized and a pelvic ultrasound was ordered. Pelvic ultrasound on 2021 demonstrated an \"abnormal, enlarged left ovary, 8.5cm x 7.5 x 7.3cm\". The patient was subsequently referred to our office for further discussion and management. Ca-125 on 2021 was 10.3    She is followed at [facility] in regard to her ESRD and need for kidney transplant. Patient has a history of prior peritoneal dialysis, although this was complicated by an intra-abdominal abscess/infection that required drainage. Pertinent PMH/PSH: SLE, ESRD on hemodialysis, on list for kidney transplant, HTN      Active, no restrictions. Imaging Review:   MRI 3/12/2021:  FINDINGS:   Left adnexal mass: In the left adnexa, a circumscribed, septated, heterogeneous  mass measures 56 x 60 x 45 mm. Internal septations wall it off into multiple  different loculations. The loculations are predominantly comprised of fluid,  with mixed signal intensities. In at least three of the loculations posteriorly, there is T1 hyperintensity  (13-59) with heterogeneous, intermediate T2 signal intensity (9-15), consistent  with hemorrhage. The largest, anterior loculation is simple fluid signal  intensity. The thin septations enhance, though there are no thick septations or  enhancing nodular projections. On CT, there are punctate calcifications in the  septations. By sonography, the septations are also thin, and the loculations are  essentially anechoic. The left ovary is not separately visible from this mass. On 12/12/2017, there was a peritoneal dialysis catheter in place in the left  lower quadrant, with a large volume of ascites (more than expected for  peritoneal dialysis). On 3/11/2018, there was a loculated mass of ascites  filling much of the abdomen and pelvis. This was subsequently drained. On  11/16/2018, there was a much smaller, loculated fluid collection in the  anteroinferior pelvis and left adnexa. On 3/4/2019, a left adnexal mass measured  54 x 69 x 43 mm; a subtle heme-fluid level within it (on coronal images)  indicated hemorrhage. Between 2019 and 2021, it has fluctuated somewhat in size. Given MRI findings, history, and historic CT findings, the left adnexal,  complex, loculated, cystic mass is probably a peritoneal inclusion cyst,  complicated by hemorrhage.    Midline simple fluid sac: Immediately contiguous with the anterosuperior margin  of the left adnexal mass, a lobulated, circumscribed, simple fluid, nonenhancing  sac in the anterior midline pelvis measures 61 x 121 x 88 mm. A pseudopodic  extension lies along the distal ileum and cecum anteriorly (11-1, 11-2, 5-7). This is likely a simple peritoneal inclusion cyst. It has been present to some  degree since at least 3/28/2020. Right hydrosalpinx: In the right adnexa, the right fallopian tube is dilated  with simple fluid (11-6, 11-7, 9-7). The right fallopian tube is immediately  contiguous with and inseparable from the midline simple fluid sac as well. The  right ovary is stretched along the posterior aspect of the midline peritoneal  inclusion cyst (10-6), but is normal in size (9-9). Uterus and vagina: Exophytic from the uterine fundus anteriorly, there are  three, immediately adjacent, small, pedunculated, subserosal fibroids (11-6,  9-19). An IUD is in appropriate position in the endometrial cavity. The  junctional zone is diffusely thickened. This is greatest posteriorly, where the  junctional zone measures 10-16 mm in thickness. Adenomyosis is suspected; no  subendometrial cystic foci to further confirm this. There is a  scar in  the anterior wall of the lower uterine segment (18-49). Incidental note is made  of multiple cervical nabothian cysts. A small cyst in the right anterolateral, inferior vagina is consistent with a  Cassidy duct cyst. A septated cystic lesion on the left, posterior to the  superior vagina and cervix (10-15, 11-23) may also be a mesonephric/Wulffian  duct remnant, as it appears to be within the vaginal wall (15-76). However,  given the other peritoneal inclusion cysts and the history, this is probably an  additional, small, peritoneal inclusion cyst (with mass effect on the vaginal  wall rather than intramuscular inclusion). In either event, it is of no clinical  significance.   Pelvis: Several loops of small bowel are clustered and tethered to the right  anterior abdominal wall in the right upper quadrant, immediately superolateral  to the umbilicus. See sagittal image 10-7 today and images [id number] and [id number] on  CT abdomen pelvis 3/6/2021. Associated wedge-shaped, T2 hypointensity between  the bowel loops and the anterior abdominal wall (10-8) is consistent with  scarring/peritoneal adhesions. The visualized portions of the appendix, colon,  rectum, and decompressed bladder are normal. No pelvic lymphadenopathy. No free  pelvic fluid. The bone marrow is heterogeneously fatty infiltrated. IMPRESSION  Multiple sequela of old peritoneal infection:  1. Complex, loculated, septated, cystic mass in the left adnexa is probably a  complicated/hemorrhagic peritoneal inclusion cyst. No concerning solid or mural  nodular enhancement. No significant change in size from 2019.  2. Larger, simple, peritoneal inclusion cyst in the anterior pelvis. 3. Tiny peritoneal inclusion cyst (versus mesonephric/Wulffian duct remnant) in  the pouch of Bethel. 4. Peritoneal adhesions and clustered, tethered, small bowel loops in the  anterior, periumbilical, right upper quadrant. 5. Right hydrosalpinx. 6. Adenomyosis. CT A/P 3/6/2021:   FINDINGS:  LUNG BASES:No mass lesion or effusion. LIVER: No mass or biliary dilatation. There is no intrahepatic duct dilatation. There is no hepatic parenchymal mass. Hepatic enhancement pattern is within  normal limits. Portal vein is patent. GALLBLADDER:  No dilatation or wall thickening. SPLEEN/PANCREAS: No mass. There is no pancreatic duct dilatation. There is no  evidence of splenomegaly. ADRENALS/KIDNEYS: Renal atrophy. Patient has a history of end-stage renal  disease. There is no hydronephrosis. There is no renal mass. There is no  perinephric mass. STOMACH: No dilatation or wall thickening. COLON AND SMALL BOWEL: Fecal stasis There is no free intraperitoneal air. There  is no evidence of incarceration or obstruction. No mesenteric adenopathy. PERITONEUM: Unremarkable. APPENDIX: Unremarkable.   BLADDER/REPRODUCTIVE ORGANS: There are bilateral adnexal cystic lesions as well  as a cystic lesion anterior and superior to the uterus; conglomerate measurement  is approximately 6.9 cm by 5.4 cm. There is no free intraperitoneal gas or  fluid. There is no focal fluid collection to suggest abscess. IUD is present  within the uterus. IMPRESSION  Stable to slightly diminished left greater than right adnexal cysts. No  associated free fluid. Patient has had adnexal cyst visualized on CT scans going  back 2 2020. Renal atrophy and a patient with a history of end-stage renal disease. Imaging follow-up as clinically warranted    Pelvic ultrasound 2/25/2021:  Impression: Normal uterus with intrauterine device visualized in the appropriate intrauterine location. Left ovary enlarged with 8cm complex left ovarian cyst. Right adnexa to midline with large fluid collection vs right ovarian cyst; however no clearly definable right ovarian tissue noted. ROS:  A comprehensive review of systems was negative except for that written in the History of Present Illness. , 10 point ROS    OB/GYN ROS:  Patient denies significant menstrual problems.     ECOG ndGndrndanddndend:nd nd2nd Problem List:  Patient Active Problem List    Diagnosis Date Noted    Pelvic mass 03/16/2021    Severe obesity ([address] 11/19/2020    Seizure ([address] 10/18/2019    ESRD on hemodialysis ([address] 08/27/2018    Other constipation 04/04/2018    History of pulmonary embolism 12/08/2017    Anemia of renal disease 02/21/2017    ACP (advance care planning) 02/21/2017    Malignant hypertension 07/11/2016    Lupus nephritis ([address] 03/10/2012     PMH:  Past Medical History:   Diagnosis Date    Anemia     secondary to lupus    Asthma     no inhaler use in past 2 to 3 years    Carditis     Chronic kidney disease     ESRD    Chronic pain     DDD (degenerative disc disease), lumbar     ESRD (end stage renal disease) ([address]     GERD (gastroesophageal reflux disease)     HCAP (healthcare-associated pneumonia) 2019    Hemodialysis patient [facility]) 2017    [address]  Tuesday,  Thursday,  and Saturday. 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  Peritonitis ([address] 3/11/2018    Pneumonia 3/14/2020    Poor historian 2018    With medications    Sepsis ([address] 2018    Thromboembolus ([address]     lungs    Transfusion history     Last Transfusion 2017  at [facility]      [address]:  Past Surgical History:   Procedure Laterality Date    HX  SECTION  11/2006    x1    HX OTHER SURGICAL  9/16/15    INSERTION PD CATH; Removed 2017    HX VASCULAR ACCESS Right 2017    Double-Lumen henry catheter upper chest    HX VASCULAR ACCESS Right 2018    right upper arm      Social History:  Social History     Tobacco Use    Smoking status: Never Smoker    Smokeless tobacco: Never Used   Substance Use Topics    Alcohol use: No      Family History:  Family History   Problem Relation Age of Onset    Diabetes Father     Hypertension Father     Cancer Other         aunt with breast cancerX2    Diabetes Mother       Medications: (reviewed)  Current Outpatient Medications   Medication Sig    hydroxychloroquine sulfate (PLAQUENIL PO) Plaquenil    traMADoL (ULTRAM) 50 mg tablet Take 1 Tab by mouth every six (6) hours as needed for Pain for up to 14 days. Max Daily Amount: 200 mg.    amitriptyline (ELAVIL) 150 mg tablet Take 1 Tab by mouth nightly for 90 days.  Anti-depressant to help sleep    ondansetron (Zofran ODT) 4 mg disintegrating tablet Take 1 Tab by mouth every eight (8) hours as needed for Nausea.  pantoprazole (PROTONIX) 40 mg tablet Take 1 Tab by mouth ACB/HS.  ferric citrate (AURYXIA) 210 mg iron tablet Take 420 mg by mouth three (3) times daily (with meals).  carvedilol (COREG) 25 mg tablet Take 1 Tab by mouth two (2) times daily (with meals).  predniSONE (DELTASONE) 5 mg tablet Take 2 Tabs by mouth daily.  gemfibrozil (LOPID) 600 mg tablet Take 300 mg by mouth daily.  azaTHIOprine (IMURAN) 50 mg tablet Take 50 mg by mouth daily (after breakfast). No current facility-administered medications for this visit. Allergies: (reviewed)  Allergies   Allergen Reactions    Compazine [Prochlorperazine Edisylate] Nausea and Vomiting and Palpitations     \"hot and lightheaded\"        Gyn History:   Last pap: ASCUS 2/9/2020, HR HPV negative  Contraception: Mirena IUD 8/2020    OBJECTIVE:  *deferred today given video-conference visit for ongoing COVID-19 pandemic*   Physical Exam:  VITAL SIGNS: There were no vitals filed for this visit. There is no height or weight on file to calculate BMI. GENERAL ELTON: Conversant, alert, oriented. No acute distress. HEENT: HEENT. No thyroid enlargement. No JVD. Neck: Supple without restrictions. RESPIRATORY: Clear to auscultation and percussion to the bases. No CVAT. CARDIOVASC: RRR without murmur/rub. GASTROINT: soft, non-tender, without masses or organomegaly   MUSCULOSKEL: no joint tenderness, deformity or swelling       EXTREMITIES: extremities normal, atraumatic, no cyanosis or edema   PELVIC: Exam chaperoned by nurse. Normal appearing external genitalia. On speculum exam, normal appearing vagina and cervix. On bimanual exam, the cervix and uterus are normal size and somewhat mobile. The pelvis is full, but could not discern borders of masses noted on ultrasound. No evidence of nodularity. RECTAL: deferred   MAX SURVEY: No suspicious lymphadenopathy or edema noted.    NEURO: Grossly intact. No acute deficit. Lab Date as available:    Lab Results   Component Value Date/Time    WBC 6.2 01/17/2021 04:24 PM    HGB 12.9 01/17/2021 04:24 PM    HCT 41.8 01/17/2021 04:24 PM    PLATELET 154 08/62/8662 04:24 PM    MCV 97.4 01/17/2021 04:24 PM     Lab Results   Component Value Date/Time    Sodium 138 01/17/2021 05:44 PM    Potassium 6.7 (HH) 01/17/2021 05:44 PM    Chloride 96 (L) 01/17/2021 05:44 PM    CO2 32 01/17/2021 05:44 PM    Anion gap 10 01/17/2021 05:44 PM    Glucose 96 01/17/2021 05:44 PM    BUN 71 (H) 01/17/2021 05:44 PM    Creatinine 11.54 (H) 01/17/2021 05:44 PM    BUN/Creatinine ratio 6 (L) 01/17/2021 05:44 PM    GFR est AA 5 (L) 01/17/2021 05:44 PM    GFR est non-AA 4 (L) 01/17/2021 05:44 PM    Calcium 9.5 01/17/2021 05:44 PM         IMPRESSION/PLAN:    Ms. Annie Chowdhury is a 29 y.o. female with a working diagnosis of complex left ovarian cyst, 8cm; complicated by ESRD on hemodialysis awaiting kidney transplant. Ca-125 on 2/25/2021 10.3. Presents today for MRI pelvis results from 3/12/2021. Problems:     Patient Active Problem List    Diagnosis Date Noted    Pelvic mass 03/16/2021    Severe obesity ([address] 11/19/2020    Seizure ([address] 10/18/2019    ESRD on hemodialysis ([address] 08/27/2018    Other constipation 04/04/2018    History of pulmonary embolism 12/08/2017    Anemia of renal disease 02/21/2017    ACP (advance care planning) 02/21/2017    Malignant hypertension 07/11/2016    Lupus nephritis ([address] 03/10/2012     Reviewed patient's course to date. Her medical history is complicated. The patient has ESRD on hemodialysis and awaiting a kidney transplant, along with the fact that she has had prior intra-abdominal abscess/infection related to prior peritoneal dialysis. She is currently on the list awaiting a renal transplant. She does not have a donor as of yet. I have counseled the patient regarding benign, borderline, and malignant conditions.  She has a history of ovarian cysts, and on a recent CT A/P 3/6/2021 the radiologist notes that her left and right ovarian cysts are stable to diminished since 2/2020. Her Ca-125 is also normal at 10.3. Overall I believe her risk of malignancy is low, likely <5%. However, I have discussed the role of surgical resection versus observation. I believe some of her cysts formation may also be simply related to peritoneal adhesions from her prior infection. MRI pelvis on 3/12/2021 demonstrates:   \"Multiple sequela of old peritoneal infection:  1. Complex, loculated, septated, cystic mass in the left adnexa is probably a  complicated/hemorrhagic peritoneal inclusion cyst. No concerning solid or mural  nodular enhancement. No significant change in size from 2019.  2. Larger, simple, peritoneal inclusion cyst in the anterior pelvis. 3. Tiny peritoneal inclusion cyst (versus mesonephric/Wulffian duct remnant) in  the pouch of Bethel. 4. Peritoneal adhesions and clustered, tethered, small bowel loops in the  anterior, periumbilical, right upper quadrant. 5. Right hydrosalpinx. 6. Adenomyosis. \"    After reviewing her MRI with Dr. Kevin Beyer, [facility], I believe the majority of her findings on ultrasound are related to her prior infections from peritoneal dialysis. While surgical resection or lysis of adhesions is possible to help with this, I do not believe this is in the patient's best interest while she is awaiting a renal transplant. The patient reports her pain is manageable. I believe the most prudent thing at this point is to keep her eligible at all times for her renal transplant, rather than having her recover from a major surgery that may require a laparotomy. As well, her Ca-125 is normal and what appears to be an ovarian mass is likely all related to peritoneal inclusion cysts. Plan to continue observation. Will have patient RTC in 4 months.  All questions and concerns were addressed with the patient and she is comfortable with the plan. An electronic signature was used to authenticate this note. Aly Sutherland MD    Pursuant to the emergency declaration unde the [facility] waiver authority and the [employer] Act, this Virtual Visit was conducted, with patient's consent, to reduce the patient's risk of exposure to COVID-19 and provide continuity of care for an established patient. Patient identification was verified at the start of the visit: Yes    Services were provided through a video synchronous discussion virtually to substitute for in-person clinic visit. Patient was at her individual home, while the provider was in his/her respective office.     I spent at least 40 minutes with this established patient, and >50% of the time was spent counseling and/or coordinating care regarding pelvic mass, ESRD    Aly Sutherland MD

## 2021-04-10 DIAGNOSIS — R19.00 PELVIC MASS: ICD-10-CM

## 2021-04-12 RX ORDER — TRAMADOL HYDROCHLORIDE 50 MG/1
TABLET ORAL
Qty: 30 TAB | Refills: 0 | Status: SHIPPED | OUTPATIENT
Start: 2021-04-12 | End: 2021-05-13 | Stop reason: SDUPTHER

## 2021-04-18 DIAGNOSIS — G47.00 INSOMNIA, UNSPECIFIED TYPE: Primary | ICD-10-CM

## 2021-04-19 RX ORDER — AMITRIPTYLINE HYDROCHLORIDE 50 MG/1
150 TABLET, FILM COATED ORAL
Qty: 270 TAB | Refills: 0 | Status: SHIPPED | OUTPATIENT
Start: 2021-04-19 | End: 2021-06-03 | Stop reason: SDUPTHER

## 2021-04-19 NOTE — TELEPHONE ENCOUNTER
Let patient know that I changed her Amitriptyline tablets from    150 mg tablet one at bedtime    TO    50 mg tablet three tablets at bedtime

## 2021-05-13 DIAGNOSIS — R19.00 PELVIC MASS: ICD-10-CM

## 2021-05-13 RX ORDER — TRAMADOL HYDROCHLORIDE 50 MG/1
50 TABLET ORAL
Qty: 30 TAB | Refills: 0 | Status: SHIPPED | OUTPATIENT
Start: 2021-05-13 | End: 2021-05-27

## 2021-05-13 NOTE — TELEPHONE ENCOUNTER
I spoke with patient, she states she is having abdominal pain that comes and goes, 7/10 on pain scale, no nausea or vomiting, no fever or chills. Patient states she is having regular bowel movements. She also reports she is still having some vaginal spotting. She is requesting a refill on her Tramadol for her abdominal pain, to go to pharmacy on file which has been verified.

## 2021-05-17 NOTE — PROGRESS NOTES
Problem visit, pt complains of abdominal pain, she states it comes and goes and can get to a 7/8 on the pain scale, she states she is still spotting, today the color is bright red, last week the color was brown    1. Have you been to the ER, urgent care clinic since your last visit? Hospitalized since your last visit?  no    2. Have you seen or consulted any other health care providers outside of the [address] [place] [address] since your last visit? Include any pap smears or colon screening.    no

## 2021-05-18 ENCOUNTER — OFFICE VISIT (OUTPATIENT)
Dept: GYNECOLOGY | Age: 36
End: 2021-05-18
Payer: MEDICARE

## 2021-05-18 VITALS
HEART RATE: 98 BPM | SYSTOLIC BLOOD PRESSURE: 134 MMHG | HEIGHT: 65 IN | DIASTOLIC BLOOD PRESSURE: 98 MMHG | WEIGHT: 236 LBS | BODY MASS INDEX: 39.32 KG/M2

## 2021-05-18 DIAGNOSIS — Z30.431 IUD CHECK UP: ICD-10-CM

## 2021-05-18 DIAGNOSIS — Z99.2 ESRD ON HEMODIALYSIS (HCC): ICD-10-CM

## 2021-05-18 DIAGNOSIS — R19.00 PELVIC MASS: Primary | ICD-10-CM

## 2021-05-18 DIAGNOSIS — N93.9 ABNORMAL UTERINE BLEEDING (AUB): ICD-10-CM

## 2021-05-18 DIAGNOSIS — Z86.711 HISTORY OF PULMONARY EMBOLISM: ICD-10-CM

## 2021-05-18 DIAGNOSIS — N18.6 ESRD ON HEMODIALYSIS (HCC): ICD-10-CM

## 2021-05-18 DIAGNOSIS — M32.14 LUPUS NEPHRITIS (HCC): ICD-10-CM

## 2021-05-18 DIAGNOSIS — N94.6 DYSMENORRHEA: ICD-10-CM

## 2021-05-18 PROCEDURE — G8432 DEP SCR NOT DOC, RNG: HCPCS | Performed by: OBSTETRICS & GYNECOLOGY

## 2021-05-18 PROCEDURE — G8427 DOCREV CUR MEDS BY ELIG CLIN: HCPCS | Performed by: OBSTETRICS & GYNECOLOGY

## 2021-05-18 PROCEDURE — G0463 HOSPITAL OUTPT CLINIC VISIT: HCPCS | Performed by: OBSTETRICS & GYNECOLOGY

## 2021-05-18 PROCEDURE — G8417 CALC BMI ABV UP PARAM F/U: HCPCS | Performed by: OBSTETRICS & GYNECOLOGY

## 2021-05-18 PROCEDURE — 99214 OFFICE O/P EST MOD 30 MIN: CPT | Performed by: OBSTETRICS & GYNECOLOGY

## 2021-05-18 PROCEDURE — G9231 DOC ESRD DIA TRANS PREG: HCPCS | Performed by: OBSTETRICS & GYNECOLOGY

## 2021-05-18 RX ORDER — NORETHINDRONE 5 MG/1
5 TABLET ORAL DAILY
Qty: 30 TAB | Refills: 0 | Status: SHIPPED | OUTPATIENT
Start: 2021-05-18 | End: 2021-06-29 | Stop reason: SDUPTHER

## 2021-05-18 NOTE — PROGRESS NOTES
[facility] [address], [address] [facility]  P [phone]  F [phone]    Office Note  Patient ID:  Name:  Zoraida Andrea  MRN:  [id number]  :  1985/36 y.o. Date:  2021      HISTORY OF PRESENT ILLNESS:  Ms. Zoraida Andrea is a 29 y.o. female who initially present from Camille Barth NP for a complex pelvic mass. Her history is complicated by ESRD on hemodialysis. Prior peritoneal dialysis in  complicated by intra-abdominal abscess requiring multiple drain placements. Ca-125 10.3 on 2021. MRI pelvis on 3/12/2021 demonstrated:  Multiple sequela of old peritoneal infection:  1. Complex, loculated, septated, cystic mass in the left adnexa is probably a  complicated/hemorrhagic peritoneal inclusion cyst. No concerning solid or mural  nodular enhancement. No significant change in size from 2019.  2. Larger, simple, peritoneal inclusion cyst in the anterior pelvis. 3. Tiny peritoneal inclusion cyst (versus mesonephric/Wulffian duct remnant) in  the pouch of Bethel. 4. Peritoneal adhesions and clustered, tethered, small bowel loops in the  anterior, periumbilical, right upper quadrant. 5. Right hydrosalpinx. 6. Adenomyosis. Presents back today with dysmenorrhea. Mirena IUD place in 2020 for birth control. Denies having periods or vaginal bleeding prior to placement of the IUD. The patient reports that now she has cramping and bleeding. Reports her normal abdominal discomfort is much worse while on her menses. Denies fevers or chills. Still awaiting her renal transplant. Initial History:  Ms. Zoraida Andrea is a 29 y.o.  premenopausal female who presents as a new patient from Dr. Armani Freedman and Camille Barth NP for a complex left ovarian cyst.     The patient has ESRD and is on hemodialysis on Tues/Thurs/Sat, which is secondary to Lupus.  The patient reports that she is on the list for a kidney transplant. She had a Mirena IUD placed in 8/2020 secondary to abnormal uterine bleeding. She has experienced continuous spotting since that time. Patient reports a long history of ovarian cysts for which she has been seen in the ER multiple times. They are not currently bothering her. She has intermittent pain in her pelvis. Denies change in appetite or bowel habits. The patient presented to Candis Alcala NP and her Mirena IUD strings were not visualized and a pelvic ultrasound was ordered. Pelvic ultrasound on 2/25/2021 demonstrated an \"abnormal, enlarged left ovary, 8.5cm x 7.5 x 7.3cm\". The patient was subsequently referred to our office for further discussion and management. Ca-125 on 2/25/2021 was 10.3    She is followed at [facility] in regard to her ESRD and need for kidney transplant. Patient has a history of prior peritoneal dialysis, although this was complicated by an intra-abdominal abscess/infection that required drainage. Pertinent PMH/PSH: SLE, ESRD on hemodialysis, on list for kidney transplant, HTN      Active, no restrictions. Imaging Review:   MRI 3/12/2021:  FINDINGS:   Left adnexal mass: In the left adnexa, a circumscribed, septated, heterogeneous  mass measures 56 x 60 x 45 mm. Internal septations wall it off into multiple  different loculations. The loculations are predominantly comprised of fluid,  with mixed signal intensities. In at least three of the loculations posteriorly, there is T1 hyperintensity  (13-59) with heterogeneous, intermediate T2 signal intensity (9-15), consistent  with hemorrhage. The largest, anterior loculation is simple fluid signal  intensity. The thin septations enhance, though there are no thick septations or  enhancing nodular projections. On CT, there are punctate calcifications in the  septations. By sonography, the septations are also thin, and the loculations are  essentially anechoic. The left ovary is not separately visible from this mass.   On 12/12/2017, there was a peritoneal dialysis catheter in place in the left  lower quadrant, with a large volume of ascites (more than expected for  peritoneal dialysis). On 3/11/2018, there was a loculated mass of ascites  filling much of the abdomen and pelvis. This was subsequently drained. On  11/16/2018, there was a much smaller, loculated fluid collection in the  anteroinferior pelvis and left adnexa. On 3/4/2019, a left adnexal mass measured  54 x 69 x 43 mm; a subtle heme-fluid level within it (on coronal images)  indicated hemorrhage. Between 2019 and 2021, it has fluctuated somewhat in size. Given MRI findings, history, and historic CT findings, the left adnexal,  complex, loculated, cystic mass is probably a peritoneal inclusion cyst,  complicated by hemorrhage. Midline simple fluid sac: Immediately contiguous with the anterosuperior margin  of the left adnexal mass, a lobulated, circumscribed, simple fluid, nonenhancing  sac in the anterior midline pelvis measures 61 x 121 x 88 mm. A pseudopodic  extension lies along the distal ileum and cecum anteriorly (11-1, 11-2, 5-7). This is likely a simple peritoneal inclusion cyst. It has been present to some  degree since at least 3/28/2020. Right hydrosalpinx: In the right adnexa, the right fallopian tube is dilated  with simple fluid (11-6, 11-7, 9-7). The right fallopian tube is immediately  contiguous with and inseparable from the midline simple fluid sac as well. The  right ovary is stretched along the posterior aspect of the midline peritoneal  inclusion cyst (10-6), but is normal in size (9-9). Uterus and vagina: Exophytic from the uterine fundus anteriorly, there are  three, immediately adjacent, small, pedunculated, subserosal fibroids (11-6,  9-19). An IUD is in appropriate position in the endometrial cavity. The  junctional zone is diffusely thickened. This is greatest posteriorly, where the  junctional zone measures 10-16 mm in thickness. Adenomyosis is suspected; no  subendometrial cystic foci to further confirm this. There is a  scar in  the anterior wall of the lower uterine segment (18-49). Incidental note is made  of multiple cervical nabothian cysts. A small cyst in the right anterolateral, inferior vagina is consistent with a  Cassidy duct cyst. A septated cystic lesion on the left, posterior to the  superior vagina and cervix (10-15, 11-23) may also be a mesonephric/Wulffian  duct remnant, as it appears to be within the vaginal wall (15-76). However,  given the other peritoneal inclusion cysts and the history, this is probably an  additional, small, peritoneal inclusion cyst (with mass effect on the vaginal  wall rather than intramuscular inclusion). In either event, it is of no clinical  significance. Pelvis: Several loops of small bowel are clustered and tethered to the right  anterior abdominal wall in the right upper quadrant, immediately superolateral  to the umbilicus. See sagittal image 10-7 today and images [id number] and 601-43 on  CT abdomen pelvis 3/6/2021. Associated wedge-shaped, T2 hypointensity between  the bowel loops and the anterior abdominal wall (10-8) is consistent with  scarring/peritoneal adhesions. The visualized portions of the appendix, colon,  rectum, and decompressed bladder are normal. No pelvic lymphadenopathy. No free  pelvic fluid. The bone marrow is heterogeneously fatty infiltrated. IMPRESSION  Multiple sequela of old peritoneal infection:  1. Complex, loculated, septated, cystic mass in the left adnexa is probably a  complicated/hemorrhagic peritoneal inclusion cyst. No concerning solid or mural  nodular enhancement. No significant change in size from 2019.  2. Larger, simple, peritoneal inclusion cyst in the anterior pelvis. 3. Tiny peritoneal inclusion cyst (versus mesonephric/Wulffian duct remnant) in  the pouch of Bethel.   4. Peritoneal adhesions and clustered, tethered, small bowel loops in the  anterior, periumbilical, right upper quadrant. 5. Right hydrosalpinx. 6. Adenomyosis. CT A/P 3/6/2021:   FINDINGS:  LUNG BASES:No mass lesion or effusion. LIVER: No mass or biliary dilatation. There is no intrahepatic duct dilatation. There is no hepatic parenchymal mass. Hepatic enhancement pattern is within  normal limits. Portal vein is patent. GALLBLADDER:  No dilatation or wall thickening. SPLEEN/PANCREAS: No mass. There is no pancreatic duct dilatation. There is no  evidence of splenomegaly. ADRENALS/KIDNEYS: Renal atrophy. Patient has a history of end-stage renal  disease. There is no hydronephrosis. There is no renal mass. There is no  perinephric mass. STOMACH: No dilatation or wall thickening. COLON AND SMALL BOWEL: Fecal stasis There is no free intraperitoneal air. There  is no evidence of incarceration or obstruction. No mesenteric adenopathy. PERITONEUM: Unremarkable. APPENDIX: Unremarkable. BLADDER/REPRODUCTIVE ORGANS: There are bilateral adnexal cystic lesions as well  as a cystic lesion anterior and superior to the uterus; conglomerate measurement  is approximately 6.9 cm by 5.4 cm. There is no free intraperitoneal gas or  fluid. There is no focal fluid collection to suggest abscess. IUD is present  within the uterus. IMPRESSION  Stable to slightly diminished left greater than right adnexal cysts. No  associated free fluid. Patient has had adnexal cyst visualized on CT scans going  back 2 2020. Renal atrophy and a patient with a history of end-stage renal disease. Imaging follow-up as clinically warranted    Pelvic ultrasound 2/25/2021:  Impression: Normal uterus with intrauterine device visualized in the appropriate intrauterine location. Left ovary enlarged with 8cm complex left ovarian cyst. Right adnexa to midline with large fluid collection vs right ovarian cyst; however no clearly definable right ovarian tissue noted.      ROS:  A comprehensive review of systems was negative except for that written in the History of Present Illness. , 10 point ROS    OB/GYN ROS:  Patient denies significant menstrual problems. ECOG ndGndrndanddndend:nd nd2nd Problem List:  Patient Active Problem List    Diagnosis Date Noted    Dysmenorrhea 2021    Pelvic mass 2021    Severe obesity ([address] 2020    Seizure ([address] 10/18/2019    ESRD on hemodialysis ([address] 2018    Other constipation 2018    History of pulmonary embolism 2017    Anemia of renal disease 2017    ACP (advance care planning) 2017    Malignant hypertension 2016    Lupus nephritis ([address] 03/10/2012     PMH:  Past Medical History:   Diagnosis Date    Anemia     secondary to lupus    Asthma     no inhaler use in past 2 to 3 years    Carditis     Chronic kidney disease     ESRD    Chronic pain     DDD (degenerative disc disease), lumbar     ESRD (end stage renal disease) ([address]     GERD (gastroesophageal reflux disease)     HCAP (healthcare-associated pneumonia) 2019    Hemodialysis patient ([address] 2017    [address]  Tuesday,  Thursday,  and Saturday. 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     Peritonitis ([address] 3/11/2018    Pneumonia 3/14/2020    Poor historian 2018    With medications    Sepsis ([address] 2018    Thromboembolus ([address]     lungs    Transfusion history     Last Transfusion 2017  at [facility]      [address]:  Past Surgical History:   Procedure Laterality Date    HX  SECTION  11/2006    x1    HX OTHER SURGICAL 9/16/15    INSERTION PD CATH; Removed 12/2017    HX VASCULAR ACCESS Right 12/2017    Double-Lumen henry catheter upper chest    HX VASCULAR ACCESS Right 2018    right upper arm      Social History:  Social History     Tobacco Use    Smoking status: Never Smoker    Smokeless tobacco: Never Used   Substance Use Topics    Alcohol use: No      Family History:  Family History   Problem Relation Age of Onset    Diabetes Father     Hypertension Father     Cancer Other         aunt with breast cancerX2    Diabetes Mother       Medications: (reviewed)  Current Outpatient Medications   Medication Sig    norethindrone acetate (AYGESTIN) 5 mg tablet Take 1 Tab by mouth daily.  traMADoL (ULTRAM) 50 mg tablet Take 1 Tab by mouth every six (6) hours as needed for Pain for up to 14 days. Max Daily Amount: 200 mg.    amitriptyline (ELAVIL) 50 mg tablet Take 3 Tabs by mouth nightly for 90 days.  hydroxychloroquine sulfate (PLAQUENIL PO) Plaquenil    ondansetron (Zofran ODT) 4 mg disintegrating tablet Take 1 Tab by mouth every eight (8) hours as needed for Nausea.  pantoprazole (PROTONIX) 40 mg tablet Take 1 Tab by mouth ACB/HS.  ferric citrate (AURYXIA) 210 mg iron tablet Take 420 mg by mouth three (3) times daily (with meals).  carvedilol (COREG) 25 mg tablet Take 1 Tab by mouth two (2) times daily (with meals).  predniSONE (DELTASONE) 5 mg tablet Take 2 Tabs by mouth daily.  gemfibrozil (LOPID) 600 mg tablet Take 300 mg by mouth daily.  azaTHIOprine (IMURAN) 50 mg tablet Take 50 mg by mouth daily (after breakfast). No current facility-administered medications for this visit.      Allergies: (reviewed)  Allergies   Allergen Reactions    Compazine [Prochlorperazine Edisylate] Nausea and Vomiting and Palpitations     \"hot and lightheaded\"        Gyn History:   Last pap: ASCUS 2/9/2020, HR HPV negative  Contraception: Mirena IUD 8/2020    OBJECTIVE:    Physical Exam:  VITAL SIGNS: Vitals: 05/18/21 1153   BP: (!) 134/98   Pulse: 98   Weight: 236 lb (107 kg)   Height: 5' 5\" (1.651 m)     Body mass index is 39.27 kg/m². GENERAL ELTON: Conversant, alert, oriented. No acute distress. HEENT: HEENT. No thyroid enlargement. No JVD. Neck: Supple without restrictions. RESPIRATORY: Clear to auscultation and percussion to the bases. No CVAT. CARDIOVASC: RRR without murmur/rub. GASTROINT: soft, non-tender, without masses or organomegaly   MUSCULOSKEL: no joint tenderness, deformity or swelling       EXTREMITIES: extremities normal, atraumatic, no cyanosis or edema   PELVIC: Exam chaperoned by nurse. Normal appearing external genitalia. On speculum exam, normal appearing vagina but could not visualize cervix. On bimanual exam, the cervix and uterus are normal size and somewhat mobile. The pelvis is full, but could not discern borders of masses noted on ultrasound. No evidence of nodularity. RECTAL: deferred   MAX SURVEY: No suspicious lymphadenopathy or edema noted. NEURO: Grossly intact. No acute deficit. Lab Date as available:    Lab Results   Component Value Date/Time    WBC 6.2 01/17/2021 04:24 PM    HGB 12.9 01/17/2021 04:24 PM    HCT 41.8 01/17/2021 04:24 PM    PLATELET 917 32/16/3858 04:24 PM    MCV 97.4 01/17/2021 04:24 PM     Lab Results   Component Value Date/Time    Sodium 138 01/17/2021 05:44 PM    Potassium 6.7 (HH) 01/17/2021 05:44 PM    Chloride 96 (L) 01/17/2021 05:44 PM    CO2 32 01/17/2021 05:44 PM    Anion gap 10 01/17/2021 05:44 PM    Glucose 96 01/17/2021 05:44 PM    BUN 71 (H) 01/17/2021 05:44 PM    Creatinine 11.54 (H) 01/17/2021 05:44 PM    BUN/Creatinine ratio 6 (L) 01/17/2021 05:44 PM    GFR est AA 5 (L) 01/17/2021 05:44 PM    GFR est non-AA 4 (L) 01/17/2021 05:44 PM    Calcium 9.5 01/17/2021 05:44 PM         IMPRESSION/PLAN:    Ms. Ron Oconnor is a 29 y.o. female who initially present from [facility]  for a complex pelvic mass.  Her history is complicated by ESRD on hemodialysis. Prior peritoneal dialysis in 3872 complicated by intra-abdominal abscess requiring multiple drain placements. Ca-125 10.3 on 2/25/2021. MRI pelvis on 3/12/2021 demonstrated:  Multiple sequela of old peritoneal infection:  1. Complex, loculated, septated, cystic mass in the left adnexa is probably a  complicated/hemorrhagic peritoneal inclusion cyst. No concerning solid or mural  nodular enhancement. No significant change in size from 2019.  2. Larger, simple, peritoneal inclusion cyst in the anterior pelvis. 3. Tiny peritoneal inclusion cyst (versus mesonephric/Wulffian duct remnant) in  the pouch of Bethel. 4. Peritoneal adhesions and clustered, tethered, small bowel loops in the  anterior, periumbilical, right upper quadrant. 5. Right hydrosalpinx. 6. Adenomyosis. Problems:     Patient Active Problem List    Diagnosis Date Noted    Dysmenorrhea 05/20/2021    Pelvic mass 03/16/2021    Severe obesity ([address] 11/19/2020    Seizure ([address] 10/18/2019    ESRD on hemodialysis ([address] 08/27/2018    Other constipation 04/04/2018    History of pulmonary embolism 12/08/2017    Anemia of renal disease 02/21/2017    ACP (advance care planning) 02/21/2017    Malignant hypertension 07/11/2016    Lupus nephritis ([address] 03/10/2012     Reviewed patient's course to date. Our prior discussion from 3/17/2021 regarding her pelvic mass is below. In regard to today's visit with return of her menses and dysmenorrhea, I was unable to see her cervix on speculum exam or see the IUD strings. Plan for pelvic ultrasound to locate the IUD. In the short term will start on continuous aygestin 5mg to help stop her bleeding and dysmenorrhea. RTC via virtual visit to discuss the results of pelvic ultrasound. All questions and concerns were addressed with the patient and she is comfortable with the plan. An electronic signature was used to authenticate this note.      Tressa Duncan MD Initial Discussion from 3/17/2021:  \"Her medical history is complicated. The patient has ESRD on hemodialysis and awaiting a kidney transplant, along with the fact that she has had prior intra-abdominal abscess/infection related to prior peritoneal dialysis. She is currently on the list awaiting a renal transplant. She does not have a donor as of yet. I have counseled the patient regarding benign, borderline, and malignant conditions. She has a history of ovarian cysts, and on a recent CT A/P 3/6/2021 the radiologist notes that her left and right ovarian cysts are stable to diminished since 2/2020. Her Ca-125 is also normal at 10.3. Overall I believe her risk of malignancy is low, likely <5%. However, I have discussed the role of surgical resection versus observation. I believe some of her cysts formation may also be simply related to peritoneal adhesions from her prior infection. MRI pelvis on 3/12/2021 demonstrates:   \"Multiple sequela of old peritoneal infection:  1. Complex, loculated, septated, cystic mass in the left adnexa is probably a  complicated/hemorrhagic peritoneal inclusion cyst. No concerning solid or mural  nodular enhancement. No significant change in size from 2019.  2. Larger, simple, peritoneal inclusion cyst in the anterior pelvis. 3. Tiny peritoneal inclusion cyst (versus mesonephric/Wulffian duct remnant) in  the pouch of Bethel. 4. Peritoneal adhesions and clustered, tethered, small bowel loops in the  anterior, periumbilical, right upper quadrant. 5. Right hydrosalpinx. 6. Adenomyosis. \"    [facility], I believe the majority of her findings on ultrasound are related to her prior infections from peritoneal dialysis. While surgical resection or lysis of adhesions is possible to help with this, I do not believe this is in the patient's best interest while she is awaiting a renal transplant.  The patient reports her pain is manageable. I believe the most prudent thing at this point is to keep her eligible at all times for her renal transplant, rather than having her recover from a major surgery that may require a laparotomy. As well, her Ca-125 is normal and what appears to be an ovarian mass is likely all related to peritoneal inclusion cysts. Plan to continue observation. \"

## 2021-05-20 ENCOUNTER — HOSPITAL ENCOUNTER (OUTPATIENT)
Dept: ULTRASOUND IMAGING | Age: 36
Discharge: HOME | End: 2021-05-20
Attending: OBSTETRICS & GYNECOLOGY
Payer: MEDICARE

## 2021-05-20 DIAGNOSIS — Z30.431 IUD CHECK UP: ICD-10-CM

## 2021-05-20 DIAGNOSIS — N93.9 ABNORMAL UTERINE BLEEDING (AUB): ICD-10-CM

## 2021-05-20 DIAGNOSIS — R19.00 PELVIC MASS: ICD-10-CM

## 2021-05-20 PROBLEM — N94.6 DYSMENORRHEA: Status: ACTIVE | Noted: 2021-05-20

## 2021-05-20 PROCEDURE — 76830 TRANSVAGINAL US NON-OB: CPT

## 2021-05-20 PROCEDURE — 76856 US EXAM PELVIC COMPLETE: CPT

## 2021-05-24 NOTE — PROGRESS NOTES
Virtual visit for results to discuss ultrasound from 5/20/2021, pt states she is still having abdominal cramping and vaginal bleeding    1. Have you been to the ER, urgent care clinic since your last visit? Hospitalized since your last visit?  no    2. Have you seen or consulted any other health care providers outside of the [address] [place] [address] since your last visit? Include any pap smears or colon screening.    no

## 2021-05-25 ENCOUNTER — VIRTUAL VISIT (OUTPATIENT)
Dept: GYNECOLOGY | Age: 36
End: 2021-05-25
Payer: MEDICARE

## 2021-05-25 DIAGNOSIS — N94.6 DYSMENORRHEA: Primary | ICD-10-CM

## 2021-05-25 PROCEDURE — G0463 HOSPITAL OUTPT CLINIC VISIT: HCPCS | Performed by: OBSTETRICS & GYNECOLOGY

## 2021-05-25 PROCEDURE — G8427 DOCREV CUR MEDS BY ELIG CLIN: HCPCS | Performed by: OBSTETRICS & GYNECOLOGY

## 2021-05-25 PROCEDURE — G8432 DEP SCR NOT DOC, RNG: HCPCS | Performed by: OBSTETRICS & GYNECOLOGY

## 2021-05-25 PROCEDURE — G9231 DOC ESRD DIA TRANS PREG: HCPCS | Performed by: OBSTETRICS & GYNECOLOGY

## 2021-05-25 PROCEDURE — 99213 OFFICE O/P EST LOW 20 MIN: CPT | Performed by: OBSTETRICS & GYNECOLOGY

## 2021-05-25 NOTE — PROGRESS NOTES
[address] [place] [address] [address], [address]  P [phone]  F [phone]    Office Note  Patient ID:  Name:  Ana María Buenrostro  MRN:  [id number]  :  1985/36 y.o. Date:  2021      HISTORY OF PRESENT ILLNESS:  Ms. Ana María Buenrostro is a 29 y.o. female who initially present from Harika Choe NP for a complex pelvic mass. Her history is complicated by ESRD on hemodialysis. Prior peritoneal dialysis in  complicated by intra-abdominal abscess requiring multiple drain placements. Ca-125 10.3 on 2021. MRI pelvis on 3/12/2021 demonstrated:  Multiple sequela of old peritoneal infection:  1. Complex, loculated, septated, cystic mass in the left adnexa is probably a  complicated/hemorrhagic peritoneal inclusion cyst. No concerning solid or mural  nodular enhancement. No significant change in size from 2019.  2. Larger, simple, peritoneal inclusion cyst in the anterior pelvis. 3. Tiny peritoneal inclusion cyst (versus mesonephric/Wulffian duct remnant) in  the pouch of Bethel. 4. Peritoneal adhesions and clustered, tethered, small bowel loops in the  anterior, periumbilical, right upper quadrant. 5. Right hydrosalpinx. 6. Adenomyosis. Presents back today for imaging review to be sure her IUD is in place. Mirena IUD place in 2020 for birth control. The patient reports she was mistaken about our last visit. She reports that her Mirena IUD was placed because she was having heavy abnormal uterine bleeding. The patient reports that now she has cramping and bleeding. Reports her normal abdominal discomfort is much worse while on her menses. Denies fevers or chills. Still awaiting her renal transplant. She was started on Aygestin last week to help with her bleeding. Initial History:  Ms. Ana María Buenrostro is a 29 y.o.   premenopausal female who presents as a new patient from Dr. Ursula Huizar and Harika Choe NP for a complex left ovarian cyst.     The patient has ESRD and is on hemodialysis on Tues/Thurs/Sat, which is secondary to Lupus. The patient reports that she is on the list for a kidney transplant. She had a Mirena IUD placed in 8/2020 secondary to abnormal uterine bleeding. She has experienced continuous spotting since that time. Patient reports a long history of ovarian cysts for which she has been seen in the ER multiple times. They are not currently bothering her. She has intermittent pain in her pelvis. Denies change in appetite or bowel habits. The patient presented to Chris France NP and her Mirena IUD strings were not visualized and a pelvic ultrasound was ordered. Pelvic ultrasound on 2/25/2021 demonstrated an \"abnormal, enlarged left ovary, 8.5cm x 7.5 x 7.3cm\". The patient was subsequently referred to our office for further discussion and management. Ca-125 on 2/25/2021 was 10.3    She is [facility] at [facility] in regard to her ESRD and need for kidney transplant. Patient has a history of prior peritoneal dialysis, although this was complicated by an intra-abdominal abscess/infection that required drainage. Pertinent PMH/PSH: SLE, ESRD on hemodialysis, on list for kidney transplant, HTN      Active, no restrictions. Imaging Review:   MRI 3/12/2021:  FINDINGS:   Left adnexal mass: In the left adnexa, a circumscribed, septated, heterogeneous  mass measures 56 x 60 x 45 mm. Internal septations wall it off into multiple  different loculations. The loculations are predominantly comprised of fluid,  with mixed signal intensities. In at least three of the loculations posteriorly, there is T1 hyperintensity  (13-59) with heterogeneous, intermediate T2 signal intensity (9-15), consistent  with hemorrhage. The largest, anterior loculation is simple fluid signal  intensity. The thin septations enhance, though there are no thick septations or  enhancing nodular projections.  On CT, there are punctate calcifications in the  septations. By sonography, the septations are also thin, and the loculations are  essentially anechoic. The left ovary is not separately visible from this mass. On 12/12/2017, there was a peritoneal dialysis catheter in place in the left  lower quadrant, with a large volume of ascites (more than expected for  peritoneal dialysis). On 3/11/2018, there was a loculated mass of ascites  filling much of the abdomen and pelvis. This was subsequently drained. On  11/16/2018, there was a much smaller, loculated fluid collection in the  anteroinferior pelvis and left adnexa. On 3/4/2019, a left adnexal mass measured  54 x 69 x 43 mm; a subtle heme-fluid level within it (on coronal images)  indicated hemorrhage. Between 2019 and 2021, it has fluctuated somewhat in size. Given MRI findings, history, and historic CT findings, the left adnexal,  complex, loculated, cystic mass is probably a peritoneal inclusion cyst,  complicated by hemorrhage. Midline simple fluid sac: Immediately contiguous with the anterosuperior margin  of the left adnexal mass, a lobulated, circumscribed, simple fluid, nonenhancing  sac in the anterior midline pelvis measures 61 x 121 x 88 mm. A pseudopodic  extension lies along the distal ileum and cecum anteriorly (11-1, 11-2, 5-7). This is likely a simple peritoneal inclusion cyst. It has been present to some  degree since at least 3/28/2020. Right hydrosalpinx: In the right adnexa, the right fallopian tube is dilated  with simple fluid (11-6, 11-7, 9-7). The right fallopian tube is immediately  contiguous with and inseparable from the midline simple fluid sac as well. The  right ovary is stretched along the posterior aspect of the midline peritoneal  inclusion cyst (10-6), but is normal in size (9-9). Uterus and vagina: Exophytic from the uterine fundus anteriorly, there are  three, immediately adjacent, small, pedunculated, subserosal fibroids (11-6,  9-19).  An IUD is in appropriate position in the endometrial cavity. The  junctional zone is diffusely thickened. This is greatest posteriorly, where the  junctional zone measures 10-16 mm in thickness. Adenomyosis is suspected; no  subendometrial cystic foci to further confirm this. There is a  scar in  the anterior wall of the lower uterine segment (18-49). Incidental note is made  of multiple cervical nabothian cysts. A small cyst in the right anterolateral, inferior vagina is consistent with a  Cassidy duct cyst. A septated cystic lesion on the left, posterior to the  superior vagina and cervix (10-15, 11-23) may also be a mesonephric/Wulffian  duct remnant, as it appears to be within the vaginal wall (15-76). However,  given the other peritoneal inclusion cysts and the history, this is probably an  additional, small, peritoneal inclusion cyst (with mass effect on the vaginal  wall rather than intramuscular inclusion). In either event, it is of no clinical  significance. Pelvis: Several loops of small bowel are clustered and tethered to the right  anterior abdominal wall in the right upper quadrant, immediately superolateral  to the umbilicus. See sagittal image 10-7 today and images [id number] and 601-43 on  CT abdomen pelvis 3/6/2021. Associated wedge-shaped, T2 hypointensity between  the bowel loops and the anterior abdominal wall (10-8) is consistent with  scarring/peritoneal adhesions. The visualized portions of the appendix, colon,  rectum, and decompressed bladder are normal. No pelvic lymphadenopathy. No free  pelvic fluid. The bone marrow is heterogeneously fatty infiltrated. IMPRESSION  Multiple sequela of old peritoneal infection:  1. Complex, loculated, septated, cystic mass in the left adnexa is probably a  complicated/hemorrhagic peritoneal inclusion cyst. No concerning solid or mural  nodular enhancement. No significant change in size from 2019.  2. Larger, simple, peritoneal inclusion cyst in the anterior pelvis.   3. Tiny peritoneal inclusion cyst (versus mesonephric/Wulffian duct remnant) in  the pouch of Bethel. 4. Peritoneal adhesions and clustered, tethered, small bowel loops in the  anterior, periumbilical, right upper quadrant. 5. Right hydrosalpinx. 6. Adenomyosis. CT A/P 3/6/2021:   FINDINGS:  LUNG BASES:No mass lesion or effusion. LIVER: No mass or biliary dilatation. There is no intrahepatic duct dilatation. There is no hepatic parenchymal mass. Hepatic enhancement pattern is within  normal limits. Portal vein is patent. GALLBLADDER:  No dilatation or wall thickening. SPLEEN/PANCREAS: No mass. There is no pancreatic duct dilatation. There is no  evidence of splenomegaly. ADRENALS/KIDNEYS: Renal atrophy. Patient has a history of end-stage renal  disease. There is no hydronephrosis. There is no renal mass. There is no  perinephric mass. STOMACH: No dilatation or wall thickening. COLON AND SMALL BOWEL: Fecal stasis There is no free intraperitoneal air. There  is no evidence of incarceration or obstruction. No mesenteric adenopathy. PERITONEUM: Unremarkable. APPENDIX: Unremarkable. BLADDER/REPRODUCTIVE ORGANS: There are bilateral adnexal cystic lesions as well  as a cystic lesion anterior and superior to the uterus; conglomerate measurement  is approximately 6.9 cm by 5.4 cm. There is no free intraperitoneal gas or  fluid. There is no focal fluid collection to suggest abscess. IUD is present  within the uterus. IMPRESSION  Stable to slightly diminished left greater than right adnexal cysts. No  associated free fluid. Patient has had adnexal cyst visualized on CT scans going  back 2 2020. Renal atrophy and a patient with a history of end-stage renal disease. Imaging follow-up as clinically warranted    Pelvic ultrasound 2/25/2021:  Impression: Normal uterus with intrauterine device visualized in the appropriate intrauterine location.  Left ovary enlarged with 8cm complex left ovarian cyst. Right adnexa to midline with large fluid collection vs right ovarian cyst; however no clearly definable right ovarian tissue noted. ROS:  A comprehensive review of systems was negative except for that written in the History of Present Illness. , 10 point ROS    OB/GYN ROS:  Patient denies significant menstrual problems. ECOG ndGndrndanddndend:nd nd2nd Problem List:  Patient Active Problem List    Diagnosis Date Noted    Dysmenorrhea 05/20/2021    Pelvic mass 03/16/2021    Severe obesity ([address] 11/19/2020    Seizure ([address] 10/18/2019    ESRD on hemodialysis ([address] 08/27/2018    Other constipation 04/04/2018    History of pulmonary embolism 12/08/2017    Anemia of renal disease 02/21/2017    ACP (advance care planning) 02/21/2017    Malignant hypertension 07/11/2016    Lupus nephritis ([address] 03/10/2012     PMH:  Past Medical History:   Diagnosis Date    Anemia     secondary to lupus    Asthma     no inhaler use in past 2 to 3 years    Carditis     Chronic kidney disease     ESRD    Chronic pain     DDD (degenerative disc disease), lumbar     ESRD (end stage renal disease) ([address]     GERD (gastroesophageal reflux disease)     HCAP (healthcare-associated pneumonia) 7/16/2019    Hemodialysis patient ([address] 12/21/2017    [address]  Tuesday,  Thursday,  and Saturday.  Hypercholesterolemia     Hyperkalemia 3/4/2019    Hypertension     Hypokalemia 10/27/2017    Intra-abdominal abscess ([address] 5/12/2018    Intractable nausea and vomiting 10/21/2015    Long term (current) use of anticoagulants     Lupus (systemic lupus erythematosus) (HCC)     Malignant hypertension with chronic kidney disease stage V ([address]     Peritoneal dialysis status ([address] 10/2015    x 2 years Stopped 12/2017 due to infection and removed.     Peritonitis ([address] 3/11/2018    Pneumonia 3/14/2020    Poor historian 01/17/2018    With medications    Sepsis ([address] 4/29/2018    Thromboembolus ([address] 2013    lungs    Transfusion history     Last Transfusion 2017  at [facility]      [address]:  Past Surgical History:   Procedure Laterality Date    HX  SECTION  11/2006    x1    HX OTHER SURGICAL  9/16/15    INSERTION PD CATH; Removed 2017    HX VASCULAR ACCESS Right 2017    Double-Lumen henry catheter upper chest    HX VASCULAR ACCESS Right 2018    right upper arm      Social History:  Social History     Tobacco Use    Smoking status: Never Smoker    Smokeless tobacco: Never Used   Substance Use Topics    Alcohol use: No      Family History:  Family History   Problem Relation Age of Onset    Diabetes Father     Hypertension Father     Cancer Other         aunt with breast cancerX2    Diabetes Mother       Medications: (reviewed)  Current Outpatient Medications   Medication Sig    norethindrone acetate (AYGESTIN) 5 mg tablet Take 1 Tab by mouth daily.  traMADoL (ULTRAM) 50 mg tablet Take 1 Tab by mouth every six (6) hours as needed for Pain for up to 14 days. Max Daily Amount: 200 mg.    amitriptyline (ELAVIL) 50 mg tablet Take 3 Tabs by mouth nightly for 90 days.  hydroxychloroquine sulfate (PLAQUENIL PO) Plaquenil    ondansetron (Zofran ODT) 4 mg disintegrating tablet Take 1 Tab by mouth every eight (8) hours as needed for Nausea.  pantoprazole (PROTONIX) 40 mg tablet Take 1 Tab by mouth ACB/HS.  ferric citrate (AURYXIA) 210 mg iron tablet Take 420 mg by mouth three (3) times daily (with meals).  carvedilol (COREG) 25 mg tablet Take 1 Tab by mouth two (2) times daily (with meals).  predniSONE (DELTASONE) 5 mg tablet Take 2 Tabs by mouth daily.  gemfibrozil (LOPID) 600 mg tablet Take 300 mg by mouth daily.  azaTHIOprine (IMURAN) 50 mg tablet Take 50 mg by mouth daily (after breakfast). No current facility-administered medications for this visit.      Allergies: (reviewed)  Allergies   Allergen Reactions    Compazine [Prochlorperazine Edisylate] Nausea and Vomiting and Palpitations     \"hot and lightheaded\"        Gyn History:   Last pap: ASCUS 2/9/2020, HR HPV negative  Contraception: Mirena IUD 8/2020    OBJECTIVE:  *deferred today given video-conference visit for ongoing COVID-19 pandemic*   Physical Exam:  VITAL SIGNS: There were no vitals filed for this visit. There is no height or weight on file to calculate BMI. GENERAL ELTON: Conversant, alert, oriented. No acute distress. HEENT: HEENT. No thyroid enlargement. No JVD. Neck: Supple without restrictions. RESPIRATORY: Clear to auscultation and percussion to the bases. No CVAT. CARDIOVASC: RRR without murmur/rub. GASTROINT: soft, non-tender, without masses or organomegaly   MUSCULOSKEL: no joint tenderness, deformity or swelling       EXTREMITIES: extremities normal, atraumatic, no cyanosis or edema   PELVIC: Exam chaperoned by nurse. Normal appearing external genitalia. On speculum exam, normal appearing vagina but could not visualize cervix. On bimanual exam, the cervix and uterus are normal size and somewhat mobile. The pelvis is full, but could not discern borders of masses noted on ultrasound. No evidence of nodularity. RECTAL: deferred   MAX SURVEY: No suspicious lymphadenopathy or edema noted. NEURO: Grossly intact. No acute deficit.        Lab Date as available:    Lab Results   Component Value Date/Time    WBC 6.2 01/17/2021 04:24 PM    HGB 12.9 01/17/2021 04:24 PM    HCT 41.8 01/17/2021 04:24 PM    PLATELET 308 25/78/7625 04:24 PM    MCV 97.4 01/17/2021 04:24 PM     Lab Results   Component Value Date/Time    Sodium 138 01/17/2021 05:44 PM    Potassium 6.7 (HH) 01/17/2021 05:44 PM    Chloride 96 (L) 01/17/2021 05:44 PM    CO2 32 01/17/2021 05:44 PM    Anion gap 10 01/17/2021 05:44 PM    Glucose 96 01/17/2021 05:44 PM    BUN 71 (H) 01/17/2021 05:44 PM    Creatinine 11.54 (H) 01/17/2021 05:44 PM    BUN/Creatinine ratio 6 (L) 01/17/2021 05:44 PM    GFR est AA 5 (L) 01/17/2021 05:44 PM    GFR est non-AA 4 (L) 01/17/2021 05:44 PM    Calcium 9.5 01/17/2021 05:44 PM         IMPRESSION/PLAN:    Ms. Sergio Deluca is a 29 y.o. female who initially present from Saad Damon NP for a complex pelvic mass. Her history is complicated by ESRD on hemodialysis. Prior peritoneal dialysis in 8243 complicated by intra-abdominal abscess requiring multiple drain placements. Ca-125 10.3 on 2/25/2021. MRI pelvis on 3/12/2021 demonstrated:  Multiple sequela of old peritoneal infection:  1. Complex, loculated, septated, cystic mass in the left adnexa is probably a  complicated/hemorrhagic peritoneal inclusion cyst. No concerning solid or mural  nodular enhancement. No significant change in size from 2019.  2. Larger, simple, peritoneal inclusion cyst in the anterior pelvis. 3. Tiny peritoneal inclusion cyst (versus mesonephric/Wulffian duct remnant) in  the pouch of Bethel. 4. Peritoneal adhesions and clustered, tethered, small bowel loops in the  anterior, periumbilical, right upper quadrant. 5. Right hydrosalpinx. 6. Adenomyosis. Problems:     Patient Active Problem List    Diagnosis Date Noted    Dysmenorrhea 05/20/2021    Pelvic mass 03/16/2021    Severe obesity ([address] 11/19/2020    Seizure ([address] 10/18/2019    ESRD on hemodialysis ([address] 08/27/2018    Other constipation 04/04/2018    History of pulmonary embolism 12/08/2017    Anemia of renal disease 02/21/2017    ACP (advance care planning) 02/21/2017    Malignant hypertension 07/11/2016    Lupus nephritis ([address] 03/10/2012     Reviewed patient's course to date. Pelvic ultrasound 5/20/2021 confirms correct position of Mirena IUD. Our prior discussion from 3/17/2021 regarding her pelvic mass is below. In regard to today's visit with return of her menses and dysmenorrhea, her Mirena is in the correct place. In the short term will continue on continuous aygestin 5mg to help stop her bleeding and dysmenorrhea. RTC in 1 month for short-term follow-up.  All questions and concerns were addressed with the patient and she is comfortable with the plan. An electronic signature was used to authenticate this note. Jeanne Castillo MD           Initial Discussion from 3/17/2021:  \"Her medical history is complicated. The patient has ESRD on hemodialysis and awaiting a kidney transplant, along with the fact that she has had prior intra-abdominal abscess/infection related to prior peritoneal dialysis. She is currently on the list awaiting a renal transplant. She does not have a donor as of yet. I have counseled the patient regarding benign, borderline, and malignant conditions. She has a history of ovarian cysts, and on a recent CT A/P 3/6/2021 the radiologist notes that her left and right ovarian cysts are stable to diminished since 2/2020. Her Ca-125 is also normal at 10.3. Overall I believe her risk of malignancy is low, likely <5%. However, I have discussed the role of surgical resection versus observation. I believe some of her cysts formation may also be simply related to peritoneal adhesions from her prior infection. MRI pelvis on 3/12/2021 demonstrates:   \"Multiple sequela of old peritoneal infection:  1. Complex, loculated, septated, cystic mass in the left adnexa is probably a  complicated/hemorrhagic peritoneal inclusion cyst. No concerning solid or mural  nodular enhancement. No significant change in size from 2019.  2. Larger, simple, peritoneal inclusion cyst in the anterior pelvis. 3. Tiny peritoneal inclusion cyst (versus mesonephric/Wulffian duct remnant) in  the pouch of Bethel. 4. Peritoneal adhesions and clustered, tethered, small bowel loops in the  anterior, periumbilical, right upper quadrant. 5. Right hydrosalpinx. 6. Adenomyosis. \"    After reviewing her MRI with Dr. Alethea Kumar, [facility], I believe the majority of her findings on ultrasound are related to her prior infections from peritoneal dialysis.  While surgical resection or lysis of adhesions is possible to help with this, I do not believe this is in the patient's best interest while she is awaiting a renal transplant. The patient reports her pain is manageable. I believe the most prudent thing at this point is to keep her eligible at all times for her renal transplant, rather than having her recover from a major surgery that may require a laparotomy. As well, her Ca-125 is normal and what appears to be an ovarian mass is likely all related to peritoneal inclusion cysts. Plan to continue observation. \"      Pursuant to the emergency declaration unde the [facility], [facility] waiver authority and the [employer] Act, this Virtual Visit was conducted, with patient's consent, to reduce the patient's risk of exposure to COVID-19 and provide continuity of care for an established patient. Patient identification was verified at the start of the visit: Yes    Services were provided through a video synchronous discussion virtually to substitute for in-person clinic visit. Patient was at her individual home, while the provider was in his/her respective office.     I spent at least 25 minutes with this established patient, and >50% of the time was spent counseling and/or coordinating care regarding imaging review    Renee Adams MD

## 2021-05-27 PROBLEM — R10.2 PELVIC PAIN: Status: ACTIVE | Noted: 2021-05-27

## 2021-06-03 ENCOUNTER — OFFICE VISIT (OUTPATIENT)
Dept: NEUROLOGY | Age: 36
End: 2021-06-03
Payer: MEDICARE

## 2021-06-03 VITALS
HEIGHT: 65 IN | OXYGEN SATURATION: 99 % | DIASTOLIC BLOOD PRESSURE: 88 MMHG | WEIGHT: 227.5 LBS | HEART RATE: 86 BPM | SYSTOLIC BLOOD PRESSURE: 124 MMHG | BODY MASS INDEX: 37.9 KG/M2

## 2021-06-03 DIAGNOSIS — G47.00 INSOMNIA, UNSPECIFIED TYPE: ICD-10-CM

## 2021-06-03 PROCEDURE — G8510 SCR DEP NEG, NO PLAN REQD: HCPCS | Performed by: PSYCHIATRY & NEUROLOGY

## 2021-06-03 PROCEDURE — G8417 CALC BMI ABV UP PARAM F/U: HCPCS | Performed by: PSYCHIATRY & NEUROLOGY

## 2021-06-03 PROCEDURE — G8428 CUR MEDS NOT DOCUMENT: HCPCS | Performed by: PSYCHIATRY & NEUROLOGY

## 2021-06-03 PROCEDURE — 99214 OFFICE O/P EST MOD 30 MIN: CPT | Performed by: PSYCHIATRY & NEUROLOGY

## 2021-06-03 PROCEDURE — G9231 DOC ESRD DIA TRANS PREG: HCPCS | Performed by: PSYCHIATRY & NEUROLOGY

## 2021-06-03 RX ORDER — AMITRIPTYLINE HYDROCHLORIDE 50 MG/1
150 TABLET, FILM COATED ORAL
Qty: 270 TABLET | Refills: 1 | Status: SHIPPED | OUTPATIENT
Start: 2021-06-03 | End: 2021-09-01

## 2021-06-03 NOTE — LETTER
6/3/2021 Patient: Kaur Quach YOB: 1985 Date of Visit: 6/3/2021 Abdoulaye Lawrence NP 
[address] [facility] [address] [address] [address] [employer] Dear Abdoulaye Lawrence NP, Thank you for referring Ms. Radha Fermin to [facility] for evaluation. My notes for this consultation are attached. If you have questions, please do not hesitate to call me. I look forward to following your patient along with you. Sincerely, Moe Stout MD

## 2021-06-03 NOTE — PROGRESS NOTES
Marcy Slaughter (1985) is a 29 y.o. female, established patient, here for evaluation of the following     Chief complaint(s): No chief complaint on file. SUBJECTIVE/ OBJECTIVE:    HPI: 29 y.o. female ESRD/ HD    Following up for chronic insomnia  Taking Amitriptyline 50 mg three tabs QHS  Helping her sleep, denies daytime side effect     On renal transplant list at [facility]  Anticipates she may have transplant later this year or early next year  Seeing Ob-Gyn for a complex ovarian cyst  Reviewed their note: they will hold off on surgery due to her being in window for renal transplant    Review of Systems: as above     Allergies   Allergen Reactions    Compazine [Prochlorperazine Edisylate] Nausea and Vomiting and Palpitations     \"hot and lightheaded\"         Current Outpatient Medications:     amitriptyline (ELAVIL) 50 mg tablet, Take 3 Tablets by mouth nightly for 90 days. , Disp: 270 Tablet, Rfl: 1    norethindrone acetate (AYGESTIN) 5 mg tablet, Take 1 Tab by mouth daily. , Disp: 30 Tab, Rfl: 0    hydroxychloroquine sulfate (PLAQUENIL PO), Plaquenil, Disp: , Rfl:     ondansetron (Zofran ODT) 4 mg disintegrating tablet, Take 1 Tab by mouth every eight (8) hours as needed for Nausea., Disp: 15 Tab, Rfl: 0    pantoprazole (PROTONIX) 40 mg tablet, Take 1 Tab by mouth ACB/HS. , Disp: 60 Tab, Rfl: 0    ferric citrate (AURYXIA) 210 mg iron tablet, Take 420 mg by mouth three (3) times daily (with meals). , Disp: , Rfl:     carvedilol (COREG) 25 mg tablet, Take 1 Tab by mouth two (2) times daily (with meals). , Disp: 60 Tab, Rfl: 0    predniSONE (DELTASONE) 5 mg tablet, Take 2 Tabs by mouth daily. , Disp: 30 Tab, Rfl: 0    gemfibrozil (LOPID) 600 mg tablet, Take 300 mg by mouth daily. , Disp: , Rfl:     azaTHIOprine (IMURAN) 50 mg tablet, Take 50 mg by mouth daily (after breakfast). , Disp: , Rfl:      has a past medical history of Anemia, Asthma, Carditis, Chronic kidney disease, Chronic pain, DDD (degenerative disc disease), lumbar, ESRD (end stage renal disease) ([facility] GERD (gastroesophageal reflux disease), HCAP (healthcare-associated pneumonia) (2019), Hemodialysis patient ([address] (2017), Hypercholesterolemia, Hyperkalemia (3/4/2019), Hypertension, Hypokalemia (10/27/2017), Intra-abdominal abscess ([address] (2018), Intractable nausea and vomiting (10/21/2015), Long term (current) use of anticoagulants, Lupus (systemic lupus erythematosus) ([facility] [address] [facility] Malignant hypertension with chronic kidney disease stage V [facility]), Peritoneal dialysis status ([facility] (10/2015), Peritonitis ([facility] (3/11/2018), Pneumonia (3/14/2020), Poor historian (2018), Sepsis ([facility] (2018), Thromboembolus ([facility] [facility] (), and Transfusion history. She also has no past medical history of Adverse effect of anesthesia, Difficult intubation, Malignant hyperthermia due to anesthesia, or Pseudocholinesterase deficiency. has a past surgical history that includes hx  section (2006); hx other surgical (9/16/15); hx vascular access (Right, 2017); and hx vascular access (Right, ). Physical Exam:    Vitals:    21 1040   BP: 124/88   Pulse: 86   Height: 5' 5\" (1.651 m)   Weight: 103.2 kg (227 lb 8 oz)   SpO2: 99%     Awake, alert, conversant  EOMI, hearing/ speech normal, VF normal bilateral  Stands, ambulates without difficulty, appears mildly antalgic (? Abdominal pain from ovarian cyst)      ========================================    ASSESSMENT/ PLAN:       ICD-10-CM ICD-9-CM    1. Insomnia, unspecified type  G47.00 780.52 amitriptyline (ELAVIL) 50 mg tablet      Continue Amitriptyline 50 mg tablet THREE tabs QHS for chronic insomnia  F/u in 6 months    An electronic signature was used to authenticate this note.   -- Nate De Dios MD

## 2021-06-25 ENCOUNTER — TELEPHONE (OUTPATIENT)
Dept: FAMILY MEDICINE CLINIC | Age: 36
End: 2021-06-25

## 2021-06-25 NOTE — TELEPHONE ENCOUNTER
Pt is having a toothache, yesterday she went to the dentist and they told her she needed to get her wisdom teeth taken out but she cant do that until next week.  Pt is wondering if Raul Simmons can call something in for her pain until her appt next week    BCB# [phone]

## 2021-06-29 DIAGNOSIS — R10.2 PELVIC PAIN: ICD-10-CM

## 2021-06-29 DIAGNOSIS — R19.00 PELVIC MASS: Primary | ICD-10-CM

## 2021-06-29 DIAGNOSIS — N94.6 DYSMENORRHEA: ICD-10-CM

## 2021-06-29 RX ORDER — TRAMADOL HYDROCHLORIDE 50 MG/1
50 TABLET ORAL
COMMUNITY
End: 2021-06-29 | Stop reason: SDUPTHER

## 2021-06-29 RX ORDER — NORETHINDRONE 5 MG/1
5 TABLET ORAL DAILY
Qty: 30 TABLET | Refills: 0 | Status: SHIPPED | OUTPATIENT
Start: 2021-06-29 | End: 2021-07-06

## 2021-06-29 RX ORDER — TRAMADOL HYDROCHLORIDE 50 MG/1
50 TABLET ORAL
Qty: 20 TABLET | Refills: 0 | Status: SHIPPED | OUTPATIENT
Start: 2021-06-29 | End: 2021-07-14 | Stop reason: SDUPTHER

## 2021-06-29 RX ORDER — NORETHINDRONE 5 MG/1
5 TABLET ORAL DAILY
COMMUNITY
End: 2021-10-21 | Stop reason: SDUPTHER

## 2021-07-06 RX ORDER — NORETHINDRONE 5 MG/1
5 TABLET ORAL DAILY
Qty: 90 TABLET | Refills: 3 | Status: SHIPPED | OUTPATIENT
Start: 2021-07-06

## 2021-07-12 ENCOUNTER — TELEPHONE (OUTPATIENT)
Dept: NEUROLOGY | Age: 36
End: 2021-07-12

## 2021-07-12 NOTE — TELEPHONE ENCOUNTER
Patient dropped off form for dental surgery, sched 7/14/21 @ 7:30 AM. Form needs completion and faxed back to dentist. Return fax number on form.

## 2021-07-13 ENCOUNTER — TELEPHONE (OUTPATIENT)
Dept: GYNECOLOGY | Age: 36
End: 2021-07-13

## 2021-07-13 ENCOUNTER — TRANSCRIBE ORDER (OUTPATIENT)
Dept: GENERAL RADIOLOGY | Age: 36
End: 2021-07-13

## 2021-07-13 ENCOUNTER — HOSPITAL ENCOUNTER (OUTPATIENT)
Dept: GENERAL RADIOLOGY | Age: 36
Discharge: HOME | End: 2021-07-13
Payer: MEDICAID

## 2021-07-13 DIAGNOSIS — R76.11 PPD POSITIVE: Primary | ICD-10-CM

## 2021-07-13 DIAGNOSIS — N94.6 DYSMENORRHEA: ICD-10-CM

## 2021-07-13 DIAGNOSIS — R76.11 PPD POSITIVE: ICD-10-CM

## 2021-07-13 DIAGNOSIS — R10.2 PELVIC PAIN: ICD-10-CM

## 2021-07-13 DIAGNOSIS — R19.00 PELVIC MASS: ICD-10-CM

## 2021-07-13 PROCEDURE — 71046 X-RAY EXAM CHEST 2 VIEWS: CPT

## 2021-07-13 NOTE — TELEPHONE ENCOUNTER
Pt was last seen on 5/25/21 and it was recommended for to come back in one month, she has not been seen and no upcoming appointment

## 2021-07-14 RX ORDER — TRAMADOL HYDROCHLORIDE 50 MG/1
50 TABLET ORAL
Qty: 20 TABLET | Refills: 0 | Status: SHIPPED | OUTPATIENT
Start: 2021-07-14 | End: 2021-07-29 | Stop reason: SDUPTHER

## 2021-07-24 RX ORDER — NORETHINDRONE 5 MG/1
5 TABLET ORAL DAILY
Qty: 30 TABLET | Refills: 0 | Status: SHIPPED | OUTPATIENT
Start: 2021-07-24 | End: 2021-08-23

## 2021-07-29 DIAGNOSIS — R10.2 PELVIC PAIN: ICD-10-CM

## 2021-07-29 DIAGNOSIS — R19.00 PELVIC MASS: ICD-10-CM

## 2021-07-29 DIAGNOSIS — N94.6 DYSMENORRHEA: ICD-10-CM

## 2021-07-29 RX ORDER — TRAMADOL HYDROCHLORIDE 50 MG/1
50 TABLET ORAL
Qty: 20 TABLET | Refills: 0 | Status: SHIPPED | OUTPATIENT
Start: 2021-07-29 | End: 2021-08-10 | Stop reason: SDUPTHER

## 2021-07-29 NOTE — TELEPHONE ENCOUNTER
Pt  requesting refill on her tramadol, states she is still in pain    Requested Prescriptions     Pending Prescriptions Disp Refills    traMADoL (ULTRAM) 50 mg tablet 20 Tablet 0     Sig: Take 1 Tablet by mouth every six (6) hours as needed for Pain for up to 14 days. Max Daily Amount: 200 mg.

## 2021-08-08 ENCOUNTER — APPOINTMENT (OUTPATIENT)
Dept: CT IMAGING | Age: 36
End: 2021-08-08
Attending: EMERGENCY MEDICINE
Payer: MEDICARE

## 2021-08-08 ENCOUNTER — HOSPITAL ENCOUNTER (EMERGENCY)
Age: 36
Discharge: HOME | End: 2021-08-09
Attending: EMERGENCY MEDICINE
Payer: MEDICARE

## 2021-08-08 DIAGNOSIS — R10.2 PELVIC PAIN: Primary | ICD-10-CM

## 2021-08-08 DIAGNOSIS — N18.6 ESRD ON DIALYSIS (HCC): ICD-10-CM

## 2021-08-08 DIAGNOSIS — Z99.2 ESRD ON DIALYSIS (HCC): ICD-10-CM

## 2021-08-08 LAB
ALBUMIN SERPL-MCNC: 3 G/DL (ref 3.5–5)
ALBUMIN/GLOB SERPL: 0.6 {RATIO} (ref 1.1–2.2)
ALP SERPL-CCNC: 58 U/L (ref 45–117)
ALT SERPL-CCNC: 7 U/L (ref 12–78)
ANION GAP SERPL CALC-SCNC: 13 MMOL/L (ref 5–15)
AST SERPL-CCNC: 12 U/L (ref 15–37)
BASOPHILS # BLD: 0 K/UL (ref 0–0.1)
BASOPHILS NFR BLD: 0 % (ref 0–1)
BILIRUB SERPL-MCNC: 0.2 MG/DL (ref 0.2–1)
BUN SERPL-MCNC: 43 MG/DL (ref 6–20)
BUN/CREAT SERPL: 4 (ref 12–20)
CALCIUM SERPL-MCNC: 8.6 MG/DL (ref 8.5–10.1)
CHLORIDE SERPL-SCNC: 97 MMOL/L (ref 97–108)
CO2 SERPL-SCNC: 28 MMOL/L (ref 21–32)
CREAT SERPL-MCNC: 11.03 MG/DL (ref 0.55–1.02)
DIFFERENTIAL METHOD BLD: ABNORMAL
EOSINOPHIL # BLD: 0.1 K/UL (ref 0–0.4)
EOSINOPHIL NFR BLD: 2 % (ref 0–7)
ERYTHROCYTE [DISTWIDTH] IN BLOOD BY AUTOMATED COUNT: 14.3 % (ref 11.5–14.5)
GLOBULIN SER CALC-MCNC: 4.9 G/DL (ref 2–4)
GLUCOSE SERPL-MCNC: 93 MG/DL (ref 65–100)
HCG SERPL QL: NEGATIVE
HCT VFR BLD AUTO: 31.8 % (ref 35–47)
HGB BLD-MCNC: 9.9 G/DL (ref 11.5–16)
IMM GRANULOCYTES # BLD AUTO: 0 K/UL (ref 0–0.04)
IMM GRANULOCYTES NFR BLD AUTO: 0 % (ref 0–0.5)
LYMPHOCYTES # BLD: 0.8 K/UL (ref 0.8–3.5)
LYMPHOCYTES NFR BLD: 14 % (ref 12–49)
MCH RBC QN AUTO: 30 PG (ref 26–34)
MCHC RBC AUTO-ENTMCNC: 31.1 G/DL (ref 30–36.5)
MCV RBC AUTO: 96.4 FL (ref 80–99)
MONOCYTES # BLD: 0.2 K/UL (ref 0–1)
MONOCYTES NFR BLD: 4 % (ref 5–13)
NEUTS SEG # BLD: 4.4 K/UL (ref 1.8–8)
NEUTS SEG NFR BLD: 80 % (ref 32–75)
NRBC # BLD: 0 K/UL (ref 0–0.01)
NRBC BLD-RTO: 0 PER 100 WBC
PLATELET # BLD AUTO: 171 K/UL (ref 150–400)
PMV BLD AUTO: 10.4 FL (ref 8.9–12.9)
POTASSIUM SERPL-SCNC: 4.9 MMOL/L (ref 3.5–5.1)
PROT SERPL-MCNC: 7.9 G/DL (ref 6.4–8.2)
RBC # BLD AUTO: 3.3 M/UL (ref 3.8–5.2)
RBC MORPH BLD: ABNORMAL
RBC MORPH BLD: ABNORMAL
SODIUM SERPL-SCNC: 138 MMOL/L (ref 136–145)
WBC # BLD AUTO: 5.5 K/UL (ref 3.6–11)

## 2021-08-08 PROCEDURE — 96374 THER/PROPH/DIAG INJ IV PUSH: CPT

## 2021-08-08 PROCEDURE — 85025 COMPLETE CBC W/AUTO DIFF WBC: CPT

## 2021-08-08 PROCEDURE — 36415 COLL VENOUS BLD VENIPUNCTURE: CPT

## 2021-08-08 PROCEDURE — 74176 CT ABD & PELVIS W/O CONTRAST: CPT

## 2021-08-08 PROCEDURE — 80053 COMPREHEN METABOLIC PANEL: CPT

## 2021-08-08 PROCEDURE — 74011250636 HC RX REV CODE- 250/636: Performed by: EMERGENCY MEDICINE

## 2021-08-08 PROCEDURE — 84703 CHORIONIC GONADOTROPIN ASSAY: CPT

## 2021-08-08 PROCEDURE — 99284 EMERGENCY DEPT VISIT MOD MDM: CPT

## 2021-08-08 RX ORDER — ACETAMINOPHEN 500 MG
TABLET ORAL
COMMUNITY
End: 2021-12-27 | Stop reason: ALTCHOICE

## 2021-08-08 RX ORDER — OXYCODONE HYDROCHLORIDE 5 MG/1
5 TABLET ORAL
COMMUNITY
End: 2021-12-27 | Stop reason: ALTCHOICE

## 2021-08-08 RX ORDER — HYDROMORPHONE HYDROCHLORIDE 1 MG/ML
0.5 INJECTION, SOLUTION INTRAMUSCULAR; INTRAVENOUS; SUBCUTANEOUS
Status: COMPLETED | OUTPATIENT
Start: 2021-08-08 | End: 2021-08-08

## 2021-08-08 RX ADMIN — HYDROMORPHONE HYDROCHLORIDE 0.5 MG: 1 INJECTION, SOLUTION INTRAMUSCULAR; INTRAVENOUS; SUBCUTANEOUS at 22:33

## 2021-08-09 VITALS
SYSTOLIC BLOOD PRESSURE: 152 MMHG | RESPIRATION RATE: 16 BRPM | WEIGHT: 243.83 LBS | HEART RATE: 91 BPM | BODY MASS INDEX: 40.58 KG/M2 | TEMPERATURE: 98.5 F | DIASTOLIC BLOOD PRESSURE: 96 MMHG | OXYGEN SATURATION: 98 %

## 2021-08-09 PROCEDURE — 74011250637 HC RX REV CODE- 250/637: Performed by: EMERGENCY MEDICINE

## 2021-08-09 RX ORDER — TRAMADOL HYDROCHLORIDE 50 MG/1
50 TABLET ORAL
Status: COMPLETED | OUTPATIENT
Start: 2021-08-09 | End: 2021-08-09

## 2021-08-09 RX ADMIN — TRAMADOL HYDROCHLORIDE 50 MG: 50 TABLET, FILM COATED ORAL at 00:58

## 2021-08-09 NOTE — ED TRIAGE NOTES
Triage Note: Patient complains of mid abdominal pain since Friday. +N/V. Denies diarrhea or constipation. Patient currently being treated for fibroid tumors, but pain medication is not helping.

## 2021-08-09 NOTE — ED PROVIDER NOTES
HPI   30 yo F presents with lower abdominal pain/pelvic pain similar to what she's experienced with her known fibroids in the past. LMP-\"been a while\", has IUD. Denies fever, chills, nausea, vomiting, diarrhea, constipation, bloody or black stools, dysuria, vaginal discharge. Pt is ESRD on dialysis, MWF, last dialysis Friday, has not missed any. Taking tramadol and oxycodone for pain without relief. Pt is on eliquis for PE. Past Medical History:   Diagnosis Date    Anemia     secondary to lupus    Asthma     no inhaler use in past 2 to 3 years    Carditis     Chronic kidney disease     ESRD    Chronic pain     DDD (degenerative disc disease), lumbar     ESRD (end stage renal disease) ([address]     Fibroid tumor     GERD (gastroesophageal reflux disease)     HCAP (healthcare-associated pneumonia) 2019    Hemodialysis patient ([address] 2017    [address]  Tuesday,  Thursday,  and Saturday. 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  Peritonitis ([address] 3/11/2018    Pneumonia 3/14/2020    Poor historian 2018    With medications    Sepsis ([address] 2018    Thromboembolus ([address] 2013    lungs    Transfusion history     Last Transfusion 2017  at [facility]       Past Surgical History:   Procedure Laterality Date    HX  SECTION  11/2006    x1    HX OTHER SURGICAL  9/16/15    INSERTION PD CATH;  Removed 2017    HX VASCULAR ACCESS Right 2017    Double-Lumen henry catheter upper chest    HX VASCULAR ACCESS Right 2018    right upper arm         Family History:   Problem Relation Age of Onset    Diabetes Father     Hypertension Father     Cancer Other         aunt with breast cancerX2    Diabetes Mother        Social History     Socioeconomic History    Marital status: SINGLE     Spouse name: Not on file    Number of children: Not on file    Years of education: Not on file    Highest education level: Not on file   Occupational History    Not on file   Tobacco Use    Smoking status: Never Smoker    Smokeless tobacco: Never Used   Substance and Sexual Activity    Alcohol use: No    Drug use: Yes     Types: Prescription, OTC    Sexual activity: Not Currently   Other Topics Concern     Service No    Blood Transfusions No    Caffeine Concern No    Occupational Exposure No    Hobby Hazards No    Sleep Concern No    Stress Concern No    Weight Concern No    Special Diet No    Back Care Yes     Comment: low back pain    Exercise No    Bike Helmet No    Seat Belt Yes    Self-Exams No   Social History Narrative    Lives with parents and daughter. Social Determinants of Health     Financial Resource Strain:     Difficulty of Paying Living Expenses:    Food Insecurity:     Worried About Running Out of Food in the Last Year:     [address] in the Last Year:    Transportation Needs:     Lack of Transportation (Medical):  Lack of Transportation (Non-Medical):    Physical Activity:     Days of Exercise per Week:     Minutes of Exercise per Session:    Stress:     Feeling of Stress :    Social Connections:     Frequency of Communication with Friends and Family:     Frequency of Social Gatherings with Friends and Family:     Attends [employer] Services:     Active Member of Clubs or Organizations:     Attends Club or Organization Meetings:     Marital Status:    Intimate Partner Violence:     Fear of Current or Ex-Partner:     Emotionally Abused:     Physically Abused:     Sexually Abused:           ALLERGIES: Compazine [prochlorperazine edisylate]    Review of Systems   Constitutional: Negative for chills and fever. Respiratory: Negative for cough and shortness of breath. Cardiovascular: Negative for chest pain. Gastrointestinal: Negative for constipation, diarrhea, nausea and vomiting. Genitourinary: Positive for pelvic pain. Negative for dysuria, hematuria, vaginal bleeding and vaginal discharge. Musculoskeletal: Negative for back pain. Skin: Negative for rash. All other systems reviewed and are negative.       Vitals:    08/08/21 2159   BP: (!) 169/120   Pulse: (!) 102   Resp: 16   Temp: 98.7 °F (37.1 °C)   SpO2: 95%   Weight: 110.6 kg (243 lb 13.3 oz)            Physical Exam   Physical Examination: General appearance - alert, well appearing, and in no distress, oriented to person, place, and time and normal appearing weight  Eyes - pupils equal and reactive, extraocular eye movements intact  Neck - supple, no significant adenopathy  Chest - clear to auscultation, no wheezes, rales or rhonchi, symmetric air entry  Heart - normal rate, regular rhythm, normal S1, S2, no murmurs, rubs, clicks or gallops  Abdomen - soft, nontender, nondistended, no masses or organomegaly  Back exam - full range of motion, no tenderness, palpable spasm or pain on motion  Neurological - alert, oriented, normal speech, no focal findings or movement disorder noted  Musculoskeletal - no joint tenderness, deformity or swelling  Extremities - peripheral pulses normal, no pedal edema, no clubbing or cyanosis, dialysis graft right upper extremity with thrill  Skin - normal coloration and turgor, no rashes, no suspicious skin lesions noted  MDM  Number of Diagnoses or Management Options     Amount and/or Complexity of Data Reviewed  Clinical lab tests: ordered and reviewed  Decide to obtain previous medical records or to obtain history from someone other than the patient: yes  Review and summarize past medical records: yes    Patient Progress  Patient progress: improved         Procedures  Patient afebrile nontoxic. Vital signs stable. Reviewed past medical records including cystic mass in pelvis concerning for peritoneal inclusion cyst.  Patient needs to follow-up with Dr. Juli Alas. Labs and imaging today without acute process. Patient is due for dialysis today. No emergent need for dialysis at this time.

## 2021-08-09 NOTE — ED NOTES
Verbal shift change report given to Adalid Linares RN (oncoming nurse) by Earlene Adam RN (offgoing nurse). Report included the following information SBAR, ED Summary, MAR and Recent Results.

## 2021-08-09 NOTE — DISCHARGE INSTRUCTIONS
We hope that we have addressed all of your medical concerns. The examination and treatment you received in the Emergency Department were for an emergent problem and were not intended as complete care. It is important that you follow up with your healthcare provider(s) for ongoing care. If your symptoms worsen or do not improve as expected, and you are unable to reach your usual health care provider(s), you should return to the Emergency Department. Today's healthcare is undergoing tremendous change, and patient satisfaction surveys are one of the many tools to assess the quality of medical care. You may receive a survey from the [employer] regarding your experience in the Emergency Department. I hope that your experience has been completely positive, particularly the medical care that I provided. As such, please participate in the survey; anything less than excellent does not meet my expectations or intentions. [facility] and [facility] participate in nationally recognized quality of care measures. If your blood pressure is greater than 120/80, as reported below, we urge that you seek medical care to address the potential of high blood pressure, commonly known as hypertension. Hypertension can be hereditary or can be caused by certain medical conditions, pain, stress, or \"white coat syndrome. \"       Please make an appointment with your health care provider(s) for follow up of your Emergency Department visit. VITALS:   Patient Vitals for the past 8 hrs:   Temp Pulse Resp BP SpO2   08/08/21 2330 -- 92 16 (!) 153/111 100 %   08/08/21 2300 -- 97 16 (!) 161/115 96 %   08/08/21 2242 -- (!) 101 16 (!) 153/113 96 %   08/08/21 2200 -- (!) 102 -- -- 97 %   08/08/21 2159 98.7 °F (37.1 °C) (!) 102 16 (!) 169/120 95 %          Thank you for allowing us to provide you with medical care today.   We realize that you have many choices for your emergency care needs. Please choose us in the future for any continued health care needs. Aditya Keating Nathaly Loja, [address] [place], [place] [address]y 20.   Office: [phone]            Recent Results (from the past 24 hour(s))   CBC WITH AUTOMATED DIFF    Collection Time: 08/08/21 10:15 PM   Result Value Ref Range    WBC 5.5 3.6 - 11.0 K/uL    RBC 3.30 (L) 3.80 - 5.20 M/uL    HGB 9.9 (L) 11.5 - 16.0 g/dL    HCT 31.8 (L) 35.0 - 47.0 %    MCV 96.4 80.0 - 99.0 FL    MCH 30.0 26.0 - 34.0 PG    MCHC 31.1 30.0 - 36.5 g/dL    RDW 14.3 11.5 - 14.5 %    PLATELET 809 891 - 298 K/uL    MPV 10.4 8.9 - 12.9 FL    NRBC 0.0 0  WBC    ABSOLUTE NRBC 0.00 0.00 - 0.01 K/uL    NEUTROPHILS 80 (H) 32 - 75 %    LYMPHOCYTES 14 12 - 49 %    MONOCYTES 4 (L) 5 - 13 %    EOSINOPHILS 2 0 - 7 %    BASOPHILS 0 0 - 1 %    IMMATURE GRANULOCYTES 0 0.0 - 0.5 %    ABS. NEUTROPHILS 4.4 1.8 - 8.0 K/UL    ABS. LYMPHOCYTES 0.8 0.8 - 3.5 K/UL    ABS. MONOCYTES 0.2 0.0 - 1.0 K/UL    ABS. EOSINOPHILS 0.1 0.0 - 0.4 K/UL    ABS. BASOPHILS 0.0 0.0 - 0.1 K/UL    ABS. IMM. GRANS. 0.0 0.00 - 0.04 K/UL    DF SMEAR SCANNED      RBC COMMENTS ANISOCYTOSIS  1+        RBC COMMENTS MACROCYTOSIS  1+       METABOLIC PANEL, COMPREHENSIVE    Collection Time: 08/08/21 10:15 PM   Result Value Ref Range    Sodium 138 136 - 145 mmol/L    Potassium 4.9 3.5 - 5.1 mmol/L    Chloride 97 97 - 108 mmol/L    CO2 28 21 - 32 mmol/L    Anion gap 13 5 - 15 mmol/L    Glucose 93 65 - 100 mg/dL    BUN 43 (H) 6 - 20 MG/DL    Creatinine 11.03 (H) 0.55 - 1.02 MG/DL    BUN/Creatinine ratio 4 (L) 12 - 20      GFR est AA 5 (L) >60 ml/min/1.73m2    GFR est non-AA 4 (L) >60 ml/min/1.73m2    Calcium 8.6 8.5 - 10.1 MG/DL    Bilirubin, total 0.2 0.2 - 1.0 MG/DL    ALT (SGPT) 7 (L) 12 - 78 U/L    AST (SGOT) 12 (L) 15 - 37 U/L    Alk.  phosphatase 58 45 - 117 U/L    Protein, total 7.9 6.4 - 8.2 g/dL    Albumin 3.0 (L) 3.5 - 5.0 g/dL    Globulin 4.9 (H) 2.0 - 4.0 g/dL    A-G Ratio 0.6 (L) 1.1 - 2.2     HCG QL SERUM    Collection Time: 08/08/21 10:45 PM   Result Value Ref Range    HCG, Ql. Negative NEG         CT ABD PELV WO CONT    Result Date: 8/9/2021  EXAM: CT ABD PELV WO CONT INDICATION: pelvic pain COMPARISON: March 2021 CONTRAST:  None. TECHNIQUE: Thin axial images were obtained through the abdomen and pelvis. Coronal and sagittal reformats were generated. Oral contrast was not administered. CT dose reduction was achieved through use of a standardized protocol tailored for this examination and automatic exposure control for dose modulation. The absence of intravenous contrast material reduces the sensitivity for evaluation of the vasculature and solid organs. FINDINGS: LOWER THORAX: No significant abnormality in the incidentally imaged lower chest. LIVER: No mass. BILIARY TREE: Gallbladder is within normal limits. CBD is not dilated. SPLEEN: within normal limits. PANCREAS: No focal abnormality. ADRENALS: Unremarkable. KIDNEYS/URETERS: Atrophic kidneys. STOMACH: Unremarkable. SMALL BOWEL: No dilatation or wall thickening. COLON: No dilatation or wall thickening. APPENDIX: Appears normal. PERITONEUM: No ascites or pneumoperitoneum. RETROPERITONEUM: No lymphadenopathy or aortic aneurysm. REPRODUCTIVE ORGANS: IUD in place. URINARY BLADDER: No mass or calculus. BONES: No destructive bone lesion. ABDOMINAL WALL: No mass or hernia. ADDITIONAL COMMENTS: N/A     No acute findings seen.

## 2021-08-10 ENCOUNTER — OFFICE VISIT (OUTPATIENT)
Dept: GYNECOLOGY | Age: 36
End: 2021-08-10
Payer: MEDICARE

## 2021-08-10 VITALS
WEIGHT: 238 LBS | SYSTOLIC BLOOD PRESSURE: 143 MMHG | HEART RATE: 102 BPM | HEIGHT: 65 IN | DIASTOLIC BLOOD PRESSURE: 105 MMHG | BODY MASS INDEX: 39.65 KG/M2

## 2021-08-10 DIAGNOSIS — F11.29 OPIOID DEPENDENCE WITH UNSPECIFIED OPIOID-INDUCED DISORDER (HCC): ICD-10-CM

## 2021-08-10 DIAGNOSIS — N94.6 DYSMENORRHEA: ICD-10-CM

## 2021-08-10 DIAGNOSIS — F11.99 OPIOID USE, UNSPECIFIED WITH UNSPECIFIED OPIOID-INDUCED DISORDER (HCC): ICD-10-CM

## 2021-08-10 DIAGNOSIS — R19.00 PELVIC MASS: ICD-10-CM

## 2021-08-10 DIAGNOSIS — R10.2 PELVIC PAIN: ICD-10-CM

## 2021-08-10 DIAGNOSIS — F11.20 OPIOID DEPENDENCE, UNCOMPLICATED (HCC): ICD-10-CM

## 2021-08-10 PROCEDURE — 99214 OFFICE O/P EST MOD 30 MIN: CPT | Performed by: OBSTETRICS & GYNECOLOGY

## 2021-08-10 RX ORDER — TRAMADOL HYDROCHLORIDE 50 MG/1
50 TABLET ORAL
Qty: 90 TABLET | Refills: 0 | Status: SHIPPED | OUTPATIENT
Start: 2021-08-10 | End: 2021-09-01 | Stop reason: SDUPTHER

## 2021-08-10 NOTE — PROGRESS NOTES
ER follow up, went to ER on 8/8/2021 for abdominal pain, pt reports abdominal pain today 6/10 on pain scale, pt states she is also having spotting, she states she has not taken her blood pressure medication yet today, she states she takes her blood pressure medication at 1 pm    1. Have you been to the ER, urgent care clinic since your last visit? Hospitalized since your last visit? Yes to the ER on 8/8/2021 for abdominal pain    2. Have you seen or consulted any other health care providers outside of the [address] [place] [address] since your last visit? Include any pap smears or colon screening.    no

## 2021-08-10 NOTE — PROGRESS NOTES
[address] [place] [address] Mathias Moritz [address], [address] [facility]  P [phone]  F [phone]    Office Note  Patient ID:  Name:  Sivakumar Mathias  MRN:  [id number]  :  1985/36 y.o. Date:  8/10/2021      HISTORY OF PRESENT ILLNESS:  Ms. Sivakumar Mathias is a 29 y.o. female who initially present from Jesse Randolph NP for a complex pelvic mass. Her history is complicated by ESRD on hemodialysis. Prior peritoneal dialysis in  complicated by intra-abdominal abscess requiring multiple drain placements. Ca-125 10.3 on 2021. MRI pelvis on 3/12/2021 demonstrated:  Multiple sequela of old peritoneal infection:  1. Complex, loculated, septated, cystic mass in the left adnexa is probably a  complicated/hemorrhagic peritoneal inclusion cyst. No concerning solid or mural  nodular enhancement. No significant change in size from 2019.  2. Larger, simple, peritoneal inclusion cyst in the anterior pelvis. 3. Tiny peritoneal inclusion cyst (versus mesonephric/Wulffian duct remnant) in  the pouch of Bethel. 4. Peritoneal adhesions and clustered, tethered, small bowel loops in the  anterior, periumbilical, right upper quadrant. 5. Right hydrosalpinx. 6. Adenomyosis. Presents back today for ER follow-up. Seen in the ER on 2021 for a 2-day history of severe abdominal pain. The patient is in the final stages of being approved for a renal transplant. She is awaiting a donor. CT A/P 2021 while in the ED was negative for any acute findings. Mirena IUD place in 2020 for birth control. The patient reports she was mistaken about our last visit. She reports that her Mirena IUD was placed because she was having heavy abnormal uterine bleeding. She has had very minimal vaginal spotting/bleeding. She has been on add-back Aygestin which has helped her bleeding. Initial History:  Ms. Sivakumar Mathias is a 29 y.o.   premenopausal female who presents as a new patient from Dr. Eryn Ferris and Tania Hebert NP for a complex left ovarian cyst.     The patient has ESRD and is on hemodialysis on Tues/Thurs/Sat, which is secondary to Lupus. The patient reports that she is on the list for a kidney transplant. She had a Mirena IUD placed in 8/2020 secondary to abnormal uterine bleeding. She has experienced continuous spotting since that time. Patient reports a long history of ovarian cysts for which she has been seen in the ER multiple times. They are not currently bothering her. She has intermittent pain in her pelvis. Denies change in appetite or bowel habits. The patient presented to Tania Hebert NP and her Mirena IUD strings were not visualized and a pelvic ultrasound was ordered. Pelvic ultrasound on 2/25/2021 demonstrated an \"abnormal, enlarged left ovary, 8.5cm x 7.5 x 7.3cm\". The patient was subsequently referred to our office for further discussion and management. Ca-125 on 2/25/2021 was 10.3    She is followed at [facility] in regard to her ESRD and need for kidney transplant. Patient has a history of prior peritoneal dialysis, although this was complicated by an intra-abdominal abscess/infection that required drainage. Pertinent PMH/PSH: SLE, ESRD on hemodialysis, on list for kidney transplant, HTN      Active, no restrictions. Imaging Review:   CT A/P 8/8/2021:  FINDINGS:   LOWER THORAX: No significant abnormality in the incidentally imaged lower chest.  LIVER: No mass. BILIARY TREE: Gallbladder is within normal limits. CBD is not dilated. SPLEEN: within normal limits. PANCREAS: No focal abnormality. ADRENALS: Unremarkable. KIDNEYS/URETERS: Atrophic kidneys. STOMACH: Unremarkable. SMALL BOWEL: No dilatation or wall thickening. COLON: No dilatation or wall thickening. APPENDIX: Appears normal.  PERITONEUM: No ascites or pneumoperitoneum. RETROPERITONEUM: No lymphadenopathy or aortic aneurysm. REPRODUCTIVE ORGANS: IUD in place.   URINARY BLADDER: No mass or calculus. BONES: No destructive bone lesion. ABDOMINAL WALL: No mass or hernia. ADDITIONAL COMMENTS: N/A  IMPRESSION  No acute findings seen. MRI 3/12/2021:  FINDINGS:   Left adnexal mass: In the left adnexa, a circumscribed, septated, heterogeneous  mass measures 56 x 60 x 45 mm. Internal septations wall it off into multiple  different loculations. The loculations are predominantly comprised of fluid,  with mixed signal intensities. In at least three of the loculations posteriorly, there is T1 hyperintensity  (13-59) with heterogeneous, intermediate T2 signal intensity (9-15), consistent  with hemorrhage. The largest, anterior loculation is simple fluid signal  intensity. The thin septations enhance, though there are no thick septations or  enhancing nodular projections. On CT, there are punctate calcifications in the  septations. By sonography, the septations are also thin, and the loculations are  essentially anechoic. The left ovary is not separately visible from this mass. On 12/12/2017, there was a peritoneal dialysis catheter in place in the left  lower quadrant, with a large volume of ascites (more than expected for  peritoneal dialysis). On 3/11/2018, there was a loculated mass of ascites  filling much of the abdomen and pelvis. This was subsequently drained. On  11/16/2018, there was a much smaller, loculated fluid collection in the  anteroinferior pelvis and left adnexa. On 3/4/2019, a left adnexal mass measured  54 x 69 x 43 mm; a subtle heme-fluid level within it (on coronal images)  indicated hemorrhage. Between 2019 and 2021, it has fluctuated somewhat in size. Given MRI findings, history, and historic CT findings, the left adnexal,  complex, loculated, cystic mass is probably a peritoneal inclusion cyst,  complicated by hemorrhage.    Midline simple fluid sac: Immediately contiguous with the anterosuperior margin  of the left adnexal mass, a lobulated, circumscribed, simple fluid, nonenhancing  sac in the anterior midline pelvis measures 61 x 121 x 88 mm. A pseudopodic  extension lies along the distal ileum and cecum anteriorly (11-1, 11-2, 5-7). This is likely a simple peritoneal inclusion cyst. It has been present to some  degree since at least 3/28/2020. Right hydrosalpinx: In the right adnexa, the right fallopian tube is dilated  with simple fluid (11-6, 11-7, 9-7). The right fallopian tube is immediately  contiguous with and inseparable from the midline simple fluid sac as well. The  right ovary is stretched along the posterior aspect of the midline peritoneal  inclusion cyst (10-6), but is normal in size (9-9). Uterus and vagina: Exophytic from the uterine fundus anteriorly, there are  three, immediately adjacent, small, pedunculated, subserosal fibroids (11-6,  9-19). An IUD is in appropriate position in the endometrial cavity. The  junctional zone is diffusely thickened. This is greatest posteriorly, where the  junctional zone measures 10-16 mm in thickness. Adenomyosis is suspected; no  subendometrial cystic foci to further confirm this. There is a  scar in  the anterior wall of the lower uterine segment (18-49). Incidental note is made  of multiple cervical nabothian cysts. A small cyst in the right anterolateral, inferior vagina is consistent with a  Cassidy duct cyst. A septated cystic lesion on the left, posterior to the  superior vagina and cervix (10-15, 11-23) may also be a mesonephric/Wulffian  duct remnant, as it appears to be within the vaginal wall (15-76). However,  given the other peritoneal inclusion cysts and the history, this is probably an  additional, small, peritoneal inclusion cyst (with mass effect on the vaginal  wall rather than intramuscular inclusion). In either event, it is of no clinical  significance.   Pelvis: Several loops of small bowel are clustered and tethered to the right  anterior abdominal wall in the right upper quadrant, immediately superolateral  to the umbilicus. See sagittal image 10-7 today and images [id number] and [id number] on  CT abdomen pelvis 3/6/2021. Associated wedge-shaped, T2 hypointensity between  the bowel loops and the anterior abdominal wall (10-8) is consistent with  scarring/peritoneal adhesions. The visualized portions of the appendix, colon,  rectum, and decompressed bladder are normal. No pelvic lymphadenopathy. No free  pelvic fluid. The bone marrow is heterogeneously fatty infiltrated. IMPRESSION  Multiple sequela of old peritoneal infection:  1. Complex, loculated, septated, cystic mass in the left adnexa is probably a  complicated/hemorrhagic peritoneal inclusion cyst. No concerning solid or mural  nodular enhancement. No significant change in size from 2019.  2. Larger, simple, peritoneal inclusion cyst in the anterior pelvis. 3. Tiny peritoneal inclusion cyst (versus mesonephric/Wulffian duct remnant) in  the pouch of Bethel. 4. Peritoneal adhesions and clustered, tethered, small bowel loops in the  anterior, periumbilical, right upper quadrant. 5. Right hydrosalpinx. 6. Adenomyosis. CT A/P 3/6/2021:   FINDINGS:  LUNG BASES:No mass lesion or effusion. LIVER: No mass or biliary dilatation. There is no intrahepatic duct dilatation. There is no hepatic parenchymal mass. Hepatic enhancement pattern is within  normal limits. Portal vein is patent. GALLBLADDER:  No dilatation or wall thickening. SPLEEN/PANCREAS: No mass. There is no pancreatic duct dilatation. There is no  evidence of splenomegaly. ADRENALS/KIDNEYS: Renal atrophy. Patient has a history of end-stage renal  disease. There is no hydronephrosis. There is no renal mass. There is no  perinephric mass. STOMACH: No dilatation or wall thickening. COLON AND SMALL BOWEL: Fecal stasis There is no free intraperitoneal air. There  is no evidence of incarceration or obstruction. No mesenteric adenopathy.   PERITONEUM: Unremarkable. APPENDIX: Unremarkable. BLADDER/REPRODUCTIVE ORGANS: There are bilateral adnexal cystic lesions as well  as a cystic lesion anterior and superior to the uterus; conglomerate measurement  is approximately 6.9 cm by 5.4 cm. There is no free intraperitoneal gas or  fluid. There is no focal fluid collection to suggest abscess. IUD is present  within the uterus. IMPRESSION  Stable to slightly diminished left greater than right adnexal cysts. No  associated free fluid. Patient has had adnexal cyst visualized on CT scans going  back 2 2020. Renal atrophy and a patient with a history of end-stage renal disease. Imaging follow-up as clinically warranted    Pelvic ultrasound 2/25/2021:  Impression: Normal uterus with intrauterine device visualized in the appropriate intrauterine location. Left ovary enlarged with 8cm complex left ovarian cyst. Right adnexa to midline with large fluid collection vs right ovarian cyst; however no clearly definable right ovarian tissue noted. ROS:  A comprehensive review of systems was negative except for that written in the History of Present Illness. , 10 point ROS    OB/GYN ROS:  Patient denies significant menstrual problems.     ECOG ndGndrndanddndend:nd nd2nd Problem List:  Patient Active Problem List    Diagnosis Date Noted    Pelvic pain 05/27/2021    Dysmenorrhea 05/20/2021    Pelvic mass 03/16/2021    Severe obesity ([address] 11/19/2020    Seizure ([address] 10/18/2019    ESRD on hemodialysis ([address] 08/27/2018    Other constipation 04/04/2018    History of pulmonary embolism 12/08/2017    Anemia of renal disease 02/21/2017    ACP (advance care planning) 02/21/2017    Malignant hypertension 07/11/2016    Lupus nephritis ([address] 03/10/2012     PMH:  Past Medical History:   Diagnosis Date    Anemia     secondary to lupus    Asthma     no inhaler use in past 2 to 3 years    Carditis     Chronic kidney disease     ESRD    Chronic pain     DDD (degenerative disc disease), lumbar     ESRD (end stage renal disease) ([address]     Fibroid tumor     GERD (gastroesophageal reflux disease)     HCAP (healthcare-associated pneumonia) 2019    Hemodialysis patient [facility]) 2017    [address]  Tuesday,  Thursday,  and Saturday. 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  Peritonitis ([address] 3/11/2018    Pneumonia 3/14/2020    Poor historian 2018    With medications    Sepsis ([address] 2018    Thromboembolus ([address] 2013    lungs    Transfusion history     [facility]      [facility]  Past Surgical History:   Procedure Laterality Date    HX  SECTION  11/2006    x1    HX OTHER SURGICAL  9/16/15    INSERTION PD CATH; Removed 2017    HX VASCULAR ACCESS Right 2017    Double-Lumen henry catheter upper chest    HX VASCULAR ACCESS Right 2018    right upper arm      Social History:  Social History     Tobacco Use    Smoking status: Never Smoker    Smokeless tobacco: Never Used   Substance Use Topics    Alcohol use: No      Family History:  Family History   Problem Relation Age of Onset    Diabetes Father     Hypertension Father     Cancer Other         aunt with breast cancerX2    Diabetes Mother       Medications: (reviewed)  Current Outpatient Medications   Medication Sig    oxyCODONE IR (ROXICODONE) 5 mg immediate release tablet Take 5 mg by mouth every four (4) hours as needed for Pain.  acetaminophen (TYLENOL) 500 mg tablet Take  by mouth every six (6) hours as needed for Pain.     traMADoL (ULTRAM) 50 mg tablet Take 1 Tablet by mouth every six (6) hours as needed for Pain for up to 14 days. Max Daily Amount: 200 mg.    norethindrone acetate (AYGESTIN) 5 mg tablet TAKE 1 TABLET BY MOUTH DAILY    norethindrone acetate (AYGESTIN) 5 mg tablet TAKE 1 TABLET BY MOUTH DAILY    norethindrone acetate (AYGESTIN) 5 mg tablet Take 5 mg by mouth daily.  amitriptyline (ELAVIL) 50 mg tablet Take 3 Tablets by mouth nightly for 90 days.  hydroxychloroquine sulfate (PLAQUENIL PO) Plaquenil    ondansetron (Zofran ODT) 4 mg disintegrating tablet Take 1 Tab by mouth every eight (8) hours as needed for Nausea.  pantoprazole (PROTONIX) 40 mg tablet Take 1 Tab by mouth ACB/HS.  ferric citrate (AURYXIA) 210 mg iron tablet Take 420 mg by mouth three (3) times daily (with meals).  carvedilol (COREG) 25 mg tablet Take 1 Tab by mouth two (2) times daily (with meals).  predniSONE (DELTASONE) 5 mg tablet Take 2 Tabs by mouth daily.  gemfibrozil (LOPID) 600 mg tablet Take 300 mg by mouth daily.  azaTHIOprine (IMURAN) 50 mg tablet Take 50 mg by mouth daily (after breakfast). No current facility-administered medications for this visit. Allergies: (reviewed)  Allergies   Allergen Reactions    Compazine [Prochlorperazine Edisylate] Nausea and Vomiting and Palpitations     \"hot and lightheaded\"        Gyn History:   Last pap: ASCUS 2/9/2020, HR HPV negative  Contraception: Mirena IUD 8/2020    OBJECTIVE:  *deferred today given video-conference visit for ongoing COVID-19 pandemic*   Physical Exam:  VITAL SIGNS: There were no vitals filed for this visit. There is no height or weight on file to calculate BMI. GENERAL ELTON: Conversant, alert, oriented. No acute distress. HEENT: HEENT. No thyroid enlargement. No JVD. Neck: Supple without restrictions. RESPIRATORY: Clear to auscultation and percussion to the bases. No CVAT. CARDIOVASC: RRR without murmur/rub.    GASTROINT: soft, non-tender, without masses or organomegaly   MUSCULOSKEL: no joint tenderness, deformity or swelling       EXTREMITIES: extremities normal, atraumatic, no cyanosis or edema   PELVIC: Exam chaperoned by nurse. Normal appearing external genitalia. On speculum exam, normal appearing vagina but could not visualize cervix. On bimanual exam, the cervix and uterus are normal size and somewhat mobile. The pelvis is full, but could not discern borders of masses noted on ultrasound. No evidence of nodularity. RECTAL: deferred   MAX SURVEY: No suspicious lymphadenopathy or edema noted. NEURO: Grossly intact. No acute deficit. Lab Date as available:    Lab Results   Component Value Date/Time    WBC 5.5 08/08/2021 10:15 PM    HGB 9.9 (L) 08/08/2021 10:15 PM    HCT 31.8 (L) 08/08/2021 10:15 PM    PLATELET 120 29/35/8682 10:15 PM    MCV 96.4 08/08/2021 10:15 PM     Lab Results   Component Value Date/Time    Sodium 138 08/08/2021 10:15 PM    Potassium 4.9 08/08/2021 10:15 PM    Chloride 97 08/08/2021 10:15 PM    CO2 28 08/08/2021 10:15 PM    Anion gap 13 08/08/2021 10:15 PM    Glucose 93 08/08/2021 10:15 PM    BUN 43 (H) 08/08/2021 10:15 PM    Creatinine 11.03 (H) 08/08/2021 10:15 PM    BUN/Creatinine ratio 4 (L) 08/08/2021 10:15 PM    GFR est AA 5 (L) 08/08/2021 10:15 PM    GFR est non-AA 4 (L) 08/08/2021 10:15 PM    Calcium 8.6 08/08/2021 10:15 PM         IMPRESSION/PLAN:    Ms. Kerry Ledesma is a 29 y.o. female who initially present from Sara Brooks NP for a complex pelvic mass. Her history is complicated by ESRD on hemodialysis. Prior peritoneal dialysis in 4334 complicated by intra-abdominal abscess requiring multiple drain placements. Ca-125 10.3 on 2/25/2021. MRI pelvis on 3/12/2021 demonstrated:  Multiple sequela of old peritoneal infection:  1. Complex, loculated, septated, cystic mass in the left adnexa is probably a  complicated/hemorrhagic peritoneal inclusion cyst. No concerning solid or mural  nodular enhancement.  No significant change in size from 2019.  2. Larger, simple, peritoneal inclusion cyst in the anterior pelvis. 3. Tiny peritoneal inclusion cyst (versus mesonephric/Wulffian duct remnant) in  the pouch of Bethel. 4. Peritoneal adhesions and clustered, tethered, small bowel loops in the  anterior, periumbilical, right upper quadrant. 5. Right hydrosalpinx. 6. Adenomyosis. Problems:     Patient Active Problem List    Diagnosis Date Noted    Pelvic pain 05/27/2021    Dysmenorrhea 05/20/2021    Pelvic mass 03/16/2021    Severe obesity ([address] 11/19/2020    Seizure ([address] 10/18/2019    ESRD on hemodialysis ([address] 08/27/2018    Other constipation 04/04/2018    History of pulmonary embolism 12/08/2017    Anemia of renal disease 02/21/2017    ACP (advance care planning) 02/21/2017    Malignant hypertension 07/11/2016    Lupus nephritis ([address] 03/10/2012     Reviewed patient's course to date. Pelvic ultrasound 5/20/2021 confirms correct position of Mirena IUD. Our prior discussion from 3/17/2021 regarding her pelvic mass is below. She has had very minimal spotting with her IUD and Aygestin. In regard to her recent ER visit, her CT A/P on 8/8/2021 was unremarkable. As we have discussed previously her intermittent pain is certainly a result of her adhesive disease. While a surgery may help to relieve some of these issues, she is currently awaiting a renal transplant, and I do not believe a surgery that may cause her to miss out on a donor is appropriate. For now we will continue to manage her pain with Tramadol PRN. She will continue to follow-up with our office and we will continue to prescribe her tramadol until she undergoes her renal transplant. All questions and concerns were addressed with the patient and she is comfortable with the plan. An electronic signature was used to authenticate this note.      Hussein Carpenter MD           Initial Discussion from 3/17/2021:  \"Her medical history is complicated. The patient has ESRD on hemodialysis and awaiting a kidney transplant, along with the fact that she has had prior intra-abdominal abscess/infection related to prior peritoneal dialysis. She is currently on the list awaiting a renal transplant. She does not have a donor as of yet. I have counseled the patient regarding benign, borderline, and malignant conditions. She has a history of ovarian cysts, and on a recent CT A/P 3/6/2021 the radiologist notes that her left and right ovarian cysts are stable to diminished since 2/2020. Her Ca-125 is also normal at 10.3. Overall I believe her risk of malignancy is low, likely <5%. However, I have discussed the role of surgical resection versus observation. I believe some of her cysts formation may also be simply related to peritoneal adhesions from her prior infection. MRI pelvis on 3/12/2021 demonstrates:   \"Multiple sequela of old peritoneal infection:  1. Complex, loculated, septated, cystic mass in the left adnexa is probably a  complicated/hemorrhagic peritoneal inclusion cyst. No concerning solid or mural  nodular enhancement. No significant change in size from 2019.  2. Larger, simple, peritoneal inclusion cyst in the anterior pelvis. 3. Tiny peritoneal inclusion cyst (versus mesonephric/Wulffian duct remnant) in  the pouch of Bethel. 4. Peritoneal adhesions and clustered, tethered, small bowel loops in the  anterior, periumbilical, right upper quadrant. 5. Right hydrosalpinx. 6. Adenomyosis. \"    After reviewing her MRI with Dr. Alexandre George, [facility], I believe the majority of her findings on ultrasound are related to her prior infections from peritoneal dialysis. While surgical resection or lysis of adhesions is possible to help with this, I do not believe this is in the patient's best interest while she is awaiting a renal transplant. The patient reports her pain is manageable.  I believe the most prudent thing at this point is to keep her eligible at all times for her renal transplant, rather than having her recover from a major surgery that may require a laparotomy. As well, her Ca-125 is normal and what appears to be an ovarian mass is likely all related to peritoneal inclusion cysts. Plan to continue observation. \"

## 2021-08-23 RX ORDER — NORETHINDRONE 5 MG/1
5 TABLET ORAL DAILY
Qty: 30 TABLET | Refills: 0 | Status: SHIPPED | OUTPATIENT
Start: 2021-08-23 | End: 2021-09-22

## 2021-08-30 NOTE — ED PROVIDER NOTES
HPI       29y F with ESRD (M/W/F HD - already went today) here to have her word catheter removed. States it was placed 2 days ago for bartholin's abscess. Was told it could come out in 2-3 days. Says it is uncomfortable and still having pain to the area. No fever. Unsure about drainage. No new pain. No rash. Nothing makes sx's better or worse. Using OTC meds without relief. Got a percocet in the ED yesterday and would like to have a few to use at home. Has an Ob but has not yet called for follow-up. Past Medical History:   Diagnosis Date    Anemia     secondary to lupus    Asthma     no inhaler use in past 2 to 3 years    Carditis     Chronic kidney disease     ESRD    Chronic pain     DDD (degenerative disc disease), lumbar     ESRD (end stage renal disease) (HCC)     GERD (gastroesophageal reflux disease)     Hemodialysis patient ([facility] 2017    [address]  Tuesday,  Thursday,  and Saturday.  Hypercholesterolemia     Hypertension     Intractable nausea and vomiting 10/21/2015    Long term (current) use of anticoagulants     Lupus (systemic lupus erythematosus) (HCC)     Malignant hypertension with chronic kidney disease stage V ([facility]     Peritoneal dialysis status ([facility] [address] [facility] 10/2015    x 2 years Stopped 2017 due to infection and removed.  Poor historian 2018    With medications    Thromboembolus ([facility] [address] 2013    lungs    Transfusion history     Last Transfusion 2017  at [facility]       Past Surgical History:   Procedure Laterality Date    HX  SECTION  11/2006    x1    HX OTHER SURGICAL  9/16/15    INSERTION PD CATH;  Removed 2017    HX VASCULAR ACCESS Right 2017    Double-Lumen henry catheter upper chest    HX VASCULAR ACCESS Right 2018    right upper arm         Family History:   Problem Relation Age of Onset    Diabetes Father     Hypertension Father     Cancer Other         aunt with breast cancer    Diabetes Mother        Social History Socioeconomic History    Marital status: SINGLE     Spouse name: Not on file    Number of children: Not on file    Years of education: Not on file    Highest education level: Not on file   Occupational History    Not on file   Social Needs    Financial resource strain: Not on file    Food insecurity     Worry: Not on file     Inability: Not on file    Transportation needs     Medical: Not on file     Non-medical: Not on file   Tobacco Use    Smoking status: Never Smoker    Smokeless tobacco: Never Used   Substance and Sexual Activity    Alcohol use: No    Drug use: Yes     Types: Prescription, OTC    Sexual activity: Not Currently   Lifestyle    Physical activity     Days per week: Not on file     Minutes per session: Not on file    Stress: Not on file   Relationships    Social connections     Talks on phone: Not on file     Gets together: Not on file     Attends [employer] service: Not on file     Active member of club or organization: Not on file     Attends meetings of clubs or organizations: Not on file     Relationship status: Not on file    Intimate partner violence     Fear of current or ex partner: Not on file     Emotionally abused: Not on file     Physically abused: Not on file     Forced sexual activity: Not on file   Other Topics Concern     Service No    Blood Transfusions No    Caffeine Concern No    Occupational Exposure No    Hobby Hazards No    Sleep Concern No    Stress Concern No    Weight Concern No    Special Diet No    Back Care Yes     Comment: low back pain    Exercise No    Bike Helmet No    Seat Belt Yes    Self-Exams No   Social History Narrative    Lives with parents and daughter. ALLERGIES: Compazine [prochlorperazine edisylate]    Review of Systems   Review of Systems   Constitutional: (-) weight loss. HEENT: (-) stiff neck   Eyes: (-) discharge. Respiratory: (-) cough. Cardiovascular: (-) syncope.    Gastrointestinal: (-) blood in stool.   Genitourinary: (-) hematuria. Musculoskeletal: (-) myalgias. Neurological: (-) seizure. Skin: (-) petechiae  Lymph/Immunologic: (-) enlarged lymph nodes  All other systems reviewed and are negative. Vitals:    10/23/20 1107   BP: 108/72   Pulse: (!) 111   Resp: 18   Temp: 98.8 °F (37.1 °C)   SpO2: 99%   Weight: 94.7 kg (208 lb 12.4 oz)   Height: 5' 5\" (1.651 m)            Physical Exam Nursing note and vitals reviewed. Constitutional: oriented to person, place, and time. appears well-developed and well-nourished. No distress. Head: Normocephalic and atraumatic. Sclera anicteric  Nose: No rhinorrhea  Mouth/Throat: Oropharynx is clear and moist. Pharynx normal  Eyes: Conjunctivae are normal. Pupils are equal, round, and reactive to light. Right eye exhibits no discharge. Left eye exhibits no discharge. Neck: Painless normal range of motion. Neck supple. No LAD. Cardiovascular: Normal rate, regular rhythm, normal heart sounds and intact distal pulses. Exam reveals no gallop and no friction rub. No murmur heard. Pulmonary/Chest:  No respiratory distress. No wheezes. No rales. No rhonchi. No increased work of breathing. No accessory muscle use. Good air exchange throughout. Abdominal: soft, non-tender, no rebound or guarding. No hepatosplenomegaly. Normal bowel sounds throughout. Back: no tenderness to palpation, no deformities, no CVA tenderness  Extremities/Musculoskeletal: Normal range of motion. no tenderness. No edema. Distal extremities are neurovasc intact. Lymphadenopathy:   No adenopathy. Neurological:  Alert and oriented to person, place, and time. Coordination normal. CN 2-12 intact. Motor and sensory function intact. Skin: Skin is warm and dry. No rash noted. No pallor. MDM 29y F here because she wants her word catheter removed.  Explained to her that it needs to stay in at least for a period of weeks and that her ob could help guide removal. Offered to look to make sure the area was ok but she declined an exam. Will give rx for a few percocet but also advised to follow-up with ob for ongoing management, including pain.          Procedures None

## 2021-09-01 DIAGNOSIS — R10.2 PELVIC PAIN: ICD-10-CM

## 2021-09-01 DIAGNOSIS — N94.6 DYSMENORRHEA: ICD-10-CM

## 2021-09-01 DIAGNOSIS — R19.00 PELVIC MASS: ICD-10-CM

## 2021-09-01 RX ORDER — TRAMADOL HYDROCHLORIDE 50 MG/1
50 TABLET ORAL
Qty: 90 TABLET | Refills: 0 | Status: SHIPPED | OUTPATIENT
Start: 2021-09-01 | End: 2021-09-23

## 2021-09-01 NOTE — TELEPHONE ENCOUNTER
Pt , she is requesting a refill on   Requested Prescriptions     Pending Prescriptions Disp Refills    traMADoL (ULTRAM) 50 mg tablet 90 Tablet 0     Sig: Take 1 Tablet by mouth every six (6) hours as needed for Pain for up to 30 days. Max Daily Amount: 200 mg.

## 2021-09-21 DIAGNOSIS — R19.00 PELVIC MASS: ICD-10-CM

## 2021-09-21 DIAGNOSIS — R10.2 PELVIC PAIN: ICD-10-CM

## 2021-09-21 DIAGNOSIS — N94.6 DYSMENORRHEA: ICD-10-CM

## 2021-09-22 RX ORDER — NORETHINDRONE 5 MG/1
5 TABLET ORAL DAILY
Qty: 30 TABLET | Refills: 0 | Status: SHIPPED | OUTPATIENT
Start: 2021-09-22 | End: 2021-10-22

## 2021-09-22 NOTE — TELEPHONE ENCOUNTER
Pt calling to office for a refill on     Requested Prescriptions     Pending Prescriptions Disp Refills    traMADoL (ULTRAM) 50 mg tablet [Pharmacy Med Name: TRAMADOL 50MG TABLETS] 90 Tablet      Sig: TAKE 1 TABLET BY MOUTH EVERY 6 HOURS AS NEEDED FOR PAIN.  MAX DAILY AMOUNT: 200 MG

## 2021-09-23 RX ORDER — TRAMADOL HYDROCHLORIDE 50 MG/1
TABLET ORAL
Qty: 90 TABLET | Refills: 0 | Status: SHIPPED | OUTPATIENT
Start: 2021-09-23 | End: 2021-10-25 | Stop reason: SDUPTHER

## 2021-09-29 ENCOUNTER — HOSPITAL ENCOUNTER (EMERGENCY)
Age: 36
Discharge: HOME | End: 2021-09-29
Attending: EMERGENCY MEDICINE
Payer: MEDICARE

## 2021-09-29 ENCOUNTER — APPOINTMENT (OUTPATIENT)
Dept: CT IMAGING | Age: 36
End: 2021-09-29
Attending: EMERGENCY MEDICINE
Payer: MEDICARE

## 2021-09-29 VITALS
TEMPERATURE: 98 F | OXYGEN SATURATION: 100 % | HEIGHT: 65 IN | WEIGHT: 229 LBS | HEART RATE: 98 BPM | DIASTOLIC BLOOD PRESSURE: 100 MMHG | RESPIRATION RATE: 16 BRPM | SYSTOLIC BLOOD PRESSURE: 140 MMHG | BODY MASS INDEX: 38.15 KG/M2

## 2021-09-29 DIAGNOSIS — R10.9 CHRONIC ABDOMINAL PAIN: Primary | ICD-10-CM

## 2021-09-29 DIAGNOSIS — N18.6 ESRD (END STAGE RENAL DISEASE) (HCC): ICD-10-CM

## 2021-09-29 DIAGNOSIS — G89.29 CHRONIC ABDOMINAL PAIN: Primary | ICD-10-CM

## 2021-09-29 DIAGNOSIS — R19.00 PELVIC MASS: ICD-10-CM

## 2021-09-29 DIAGNOSIS — D64.9 ANEMIA, UNSPECIFIED TYPE: ICD-10-CM

## 2021-09-29 LAB
ALBUMIN SERPL-MCNC: 2.7 G/DL (ref 3.5–5)
ALBUMIN/GLOB SERPL: 0.6 {RATIO} (ref 1.1–2.2)
ALP SERPL-CCNC: 59 U/L (ref 45–117)
ALT SERPL-CCNC: 12 U/L (ref 12–78)
ANION GAP SERPL CALC-SCNC: 11 MMOL/L (ref 5–15)
AST SERPL-CCNC: 19 U/L (ref 15–37)
BASOPHILS # BLD: 0 K/UL (ref 0–0.1)
BASOPHILS NFR BLD: 0 % (ref 0–1)
BILIRUB SERPL-MCNC: 0.2 MG/DL (ref 0.2–1)
BUN SERPL-MCNC: 18 MG/DL (ref 6–20)
BUN/CREAT SERPL: 3 (ref 12–20)
CALCIUM SERPL-MCNC: 8.6 MG/DL (ref 8.5–10.1)
CHLORIDE SERPL-SCNC: 99 MMOL/L (ref 97–108)
CO2 SERPL-SCNC: 30 MMOL/L (ref 21–32)
CREAT SERPL-MCNC: 5.46 MG/DL (ref 0.55–1.02)
DIFFERENTIAL METHOD BLD: ABNORMAL
EOSINOPHIL # BLD: 0 K/UL (ref 0–0.4)
EOSINOPHIL NFR BLD: 1 % (ref 0–7)
ERYTHROCYTE [DISTWIDTH] IN BLOOD BY AUTOMATED COUNT: 16.3 % (ref 11.5–14.5)
GLOBULIN SER CALC-MCNC: 4.9 G/DL (ref 2–4)
GLUCOSE SERPL-MCNC: 110 MG/DL (ref 65–100)
HCG SERPL QL: NEGATIVE
HCT VFR BLD AUTO: 24.2 % (ref 35–47)
HGB BLD-MCNC: 7.5 G/DL (ref 11.5–16)
IMM GRANULOCYTES # BLD AUTO: 0 K/UL (ref 0–0.04)
IMM GRANULOCYTES NFR BLD AUTO: 1 % (ref 0–0.5)
LYMPHOCYTES # BLD: 0.6 K/UL (ref 0.8–3.5)
LYMPHOCYTES NFR BLD: 13 % (ref 12–49)
MCH RBC QN AUTO: 30.1 PG (ref 26–34)
MCHC RBC AUTO-ENTMCNC: 31 G/DL (ref 30–36.5)
MCV RBC AUTO: 97.2 FL (ref 80–99)
MONOCYTES # BLD: 0.4 K/UL (ref 0–1)
MONOCYTES NFR BLD: 8 % (ref 5–13)
NEUTS SEG # BLD: 3.5 K/UL (ref 1.8–8)
NEUTS SEG NFR BLD: 77 % (ref 32–75)
NRBC # BLD: 0 K/UL (ref 0–0.01)
NRBC BLD-RTO: 0 PER 100 WBC
PLATELET # BLD AUTO: 191 K/UL (ref 150–400)
PMV BLD AUTO: 9.8 FL (ref 8.9–12.9)
POTASSIUM SERPL-SCNC: 3.9 MMOL/L (ref 3.5–5.1)
PROT SERPL-MCNC: 7.6 G/DL (ref 6.4–8.2)
RBC # BLD AUTO: 2.49 M/UL (ref 3.8–5.2)
RBC MORPH BLD: ABNORMAL
SODIUM SERPL-SCNC: 140 MMOL/L (ref 136–145)
WBC # BLD AUTO: 4.5 K/UL (ref 3.6–11)

## 2021-09-29 PROCEDURE — 99284 EMERGENCY DEPT VISIT MOD MDM: CPT

## 2021-09-29 PROCEDURE — 80053 COMPREHEN METABOLIC PANEL: CPT

## 2021-09-29 PROCEDURE — 36415 COLL VENOUS BLD VENIPUNCTURE: CPT

## 2021-09-29 PROCEDURE — 74011250636 HC RX REV CODE- 250/636: Performed by: EMERGENCY MEDICINE

## 2021-09-29 PROCEDURE — 85025 COMPLETE CBC W/AUTO DIFF WBC: CPT

## 2021-09-29 PROCEDURE — 74176 CT ABD & PELVIS W/O CONTRAST: CPT

## 2021-09-29 PROCEDURE — 84703 CHORIONIC GONADOTROPIN ASSAY: CPT

## 2021-09-29 PROCEDURE — 96372 THER/PROPH/DIAG INJ SC/IM: CPT

## 2021-09-29 RX ORDER — HYDROMORPHONE HYDROCHLORIDE 1 MG/ML
0.5 INJECTION, SOLUTION INTRAMUSCULAR; INTRAVENOUS; SUBCUTANEOUS ONCE
Status: DISCONTINUED | OUTPATIENT
Start: 2021-09-29 | End: 2021-09-29

## 2021-09-29 RX ORDER — HYDROMORPHONE HYDROCHLORIDE 1 MG/ML
0.5 INJECTION, SOLUTION INTRAMUSCULAR; INTRAVENOUS; SUBCUTANEOUS ONCE
Status: COMPLETED | OUTPATIENT
Start: 2021-09-29 | End: 2021-09-29

## 2021-09-29 RX ORDER — MORPHINE SULFATE 4 MG/ML
4 INJECTION INTRAVENOUS ONCE
Status: DISCONTINUED | OUTPATIENT
Start: 2021-09-29 | End: 2021-09-29

## 2021-09-29 RX ADMIN — HYDROMORPHONE HYDROCHLORIDE 0.5 MG: 1 INJECTION, SOLUTION INTRAMUSCULAR; INTRAVENOUS; SUBCUTANEOUS at 17:46

## 2021-09-29 RX ADMIN — HYDROMORPHONE HYDROCHLORIDE 0.5 MG: 1 INJECTION, SOLUTION INTRAMUSCULAR; INTRAVENOUS; SUBCUTANEOUS at 19:20

## 2021-09-29 NOTE — ED PROVIDER NOTES
18-year-old female with end-stage renal disease, no missed dialysis, GERD, hypertension, on kidney transplant list, known adnexal mass presents with complaints of flare of her chronic ovarian mass pain. Patient reports pain not controlled by tramadol at home. Patient reports her surgery has been delayed since she is on the kidney transplant list and her gynecologist, Dr. Justine Dias, did not want to cause any problems with her status on the kidney transplant list.  Patient reports that the kidney transplant list has recently cleared her for her ovarian mass surgery. Denies any recent fever, chills, nausea, vomiting, chest pain, shortness of breath. Records show recent ER visit on August 8, 2021 for similar pain complaints regarding ovarian mass. Patient reports at that time her pain was relieved by the IV medication she was provided with. Patient has followed up with Dr. Justine Dias since then per records who is planning on surgery. No missed dialysis    Denies tobacco use, drug use, alcohol use  Primary care-[facility]           Past Medical History:   Diagnosis Date    Anemia     secondary to lupus    Asthma     no inhaler use in past 2 to 3 years    Carditis     Chronic kidney disease     ESRD    Chronic pain     DDD (degenerative disc disease), lumbar     ESRD (end stage renal disease) ([address]     Fibroid tumor     GERD (gastroesophageal reflux disease)     HCAP (healthcare-associated pneumonia) 7/16/2019    Hemodialysis patient ([address] 12/21/2017    [address]  Tuesday,  Thursday,  and Saturday.      Hypercholesterolemia     Hyperkalemia 3/4/2019    Hypertension     Hypokalemia 10/27/2017    Intra-abdominal abscess ([address] 5/12/2018    Intractable nausea and vomiting 10/21/2015    Long term (current) use of anticoagulants     Lupus (systemic lupus erythematosus) (HCC)     Malignant hypertension with chronic kidney disease stage V ([address]     Peritoneal dialysis status ([address] 10/2015    x 2 years Stopped 2017 due to infection and removed.  Peritonitis ([facility] [address] 3/11/2018    Pneumonia 3/14/2020    Poor historian 2018    With medications    Sepsis ([address] 2018    Thromboembolus ([facility] [address] [facility] 2013    lungs    Transfusion history     Last Transfusion 2017  at [facility]       Past Surgical History:   Procedure Laterality Date    HX  SECTION  11/2006    x1    HX OTHER SURGICAL  9/16/15    INSERTION PD CATH; Removed 2017    HX VASCULAR ACCESS Right 2017    Double-Lumen henry catheter upper chest    HX VASCULAR ACCESS Right 2018    right upper arm         Family History:   Problem Relation Age of Onset    Diabetes Father     Hypertension Father     Cancer Other         aunt with breast cancerX2    Diabetes Mother        Social History     Socioeconomic History    Marital status: SINGLE     Spouse name: Not on file    Number of children: Not on file    Years of education: Not on file    Highest education level: Not on file   Occupational History    Not on file   Tobacco Use    Smoking status: Never Smoker    Smokeless tobacco: Never Used   Substance and Sexual Activity    Alcohol use: No    Drug use: Yes     Types: Prescription, OTC    Sexual activity: Not Currently   Other Topics Concern     Service No    Blood Transfusions No    Caffeine Concern No    Occupational Exposure No    Hobby Hazards No    Sleep Concern No    Stress Concern No    Weight Concern No    Special Diet No    Back Care Yes     Comment: low back pain    Exercise No    Bike Helmet No    Seat Belt Yes    Self-Exams No   Social History Narrative    Lives with parents and daughter. Social Determinants of Health     Financial Resource Strain:     Difficulty of Paying Living Expenses:    Food Insecurity:     Worried About Running Out of Food in the Last Year:     [address] in the Last Year:    Transportation Needs:     Lack of Transportation (Medical):      Lack of Transportation (Non-Medical):    Physical Activity:     Days of Exercise per Week:     Minutes of Exercise per Session:    Stress:     Feeling of Stress :    Social Connections:     Frequency of Communication with Friends and Family:     Frequency of Social Gatherings with Friends and Family:     Attends Congregational Services:     Active Member of Clubs or Organizations:     Attends Club or Organization Meetings:     Marital Status:    Intimate Partner Violence:     Fear of Current or Ex-Partner:     Emotionally Abused:     Physically Abused:     Sexually Abused: ALLERGIES: Compazine [prochlorperazine edisylate]    Review of Systems   Constitutional: Negative for chills and fever. HENT: Negative for congestion, nosebleeds and rhinorrhea. Eyes: Negative for pain and redness. Respiratory: Negative for cough and shortness of breath. Cardiovascular: Negative for chest pain and palpitations. Gastrointestinal: Positive for abdominal pain. Negative for nausea and vomiting. Genitourinary: Negative for vaginal bleeding and vaginal pain. Patient reports she does not make urine, not sexually active   Musculoskeletal: Negative for myalgias. Skin: Negative for rash and wound. Neurological: Negative for seizures, syncope and weakness. Hematological: Does not bruise/bleed easily. Psychiatric/Behavioral: Negative for agitation, confusion, dysphoric mood and suicidal ideas. The patient is not nervous/anxious. Vitals:    09/29/21 1705   BP: (!) 140/102   Pulse: 100   Resp: 18   Temp: 98 °F (36.7 °C)   SpO2: 100%   Weight: 103.9 kg (229 lb)   Height: 5' 5\" (1.651 m)            Physical Exam  Vitals and nursing note reviewed. Constitutional:       Appearance: She is well-developed. HENT:      Head: Normocephalic and atraumatic. Eyes:      Pupils: Pupils are equal, round, and reactive to light. Neck:      Trachea: No tracheal deviation.    Cardiovascular:      Rate and Rhythm: Normal rate and regular rhythm. Heart sounds: Normal heart sounds. Pulmonary:      Effort: Pulmonary effort is normal. No respiratory distress. Breath sounds: Normal breath sounds. No stridor. No wheezing or rales. Chest:      Chest wall: No tenderness. Abdominal:      General: Bowel sounds are normal. There is no distension. Palpations: Abdomen is soft. Tenderness: There is abdominal tenderness in the epigastric area. There is no rebound. Musculoskeletal:         General: No tenderness. Normal range of motion. Cervical back: Normal range of motion and neck supple. Skin:     General: Skin is warm and dry. Coloration: Skin is not pale. Findings: No rash. Neurological:      Mental Status: She is alert and oriented to person, place, and time. Cranial Nerves: No cranial nerve deficit. MDM  Number of Diagnoses or Management Options  Anemia, unspecified type  Chronic abdominal pain  ESRD (end stage renal disease) ([facility] [address]  Pelvic mass  Diagnosis management comments: 78-year-old female with end-stage renal disease, kidney transplant last, ovarian mass presents with complaints of increasing pain from ovarian mass. Patient is nontoxic, afebrile, hemodynamically stable, abdomen soft with epigastric tenderness to palpation. Plan-pain control, CBC/CMP, abdominal CT. Labs unremarkable       Amount and/or Complexity of Data Reviewed  Clinical lab tests: ordered and reviewed           Procedures      7:06 PM  Change of shift. Care of patient signed over to shelbi. Bedside handoff complete. Awaiting ct abd/pelvis. Anticipating dc.

## 2021-09-29 NOTE — ED TRIAGE NOTES
Pt arrives with pain from her \"right sided ovarian cyst\", pt reports she is here for \"pain control until she can have her surgery\". VSS.

## 2021-09-30 ENCOUNTER — PATIENT OUTREACH (OUTPATIENT)
Dept: CASE MANAGEMENT | Age: 36
End: 2021-09-30

## 2021-09-30 NOTE — PROGRESS NOTES
9/30/2021  10:53 AM    Educated patient about risk for severe COVID-19 due to risk factors according to CDC guidelines. ACM reviewed discharge instructions, medical action plan and red flag symptoms with the patient who verbalized understanding. Discussed COVID vaccination status: yes; patient reports that she received two COVID-19 vaccine doses in [address] series. Education provided on COVID-19 vaccination as appropriate. Discussed exposure protocols and quarantine with CDC Guidelines. Patient was given an opportunity to verbalize any questions and concerns and agrees to contact ACM or health care provider for questions related to their healthcare.

## 2021-10-07 ENCOUNTER — PATIENT OUTREACH (OUTPATIENT)
Dept: CASE MANAGEMENT | Age: 36
End: 2021-10-07

## 2021-10-07 NOTE — PROGRESS NOTES
10/7/2021  10:58 AM    Ambulatory Care Manager contacted the patient by telephone to perform follow-up assessment. Verified name and  with patient as identifiers. Patient has following risk factors of: asthma and chronic kidney disease. Symptoms reviewed with patient who verbalized the following symptoms: no new symptoms. Due to no new or worsening symptoms encounter was not routed to provider for escalation. Interventions to address risk factors: Patient reports that she has PCP follow-up scheduled for next week    Educated patient about risk for severe COVID-19 due to risk factors according to CDC guidelines. ACM reviewed discharge instructions, medical action plan and red flag symptoms with the patient who verbalized understanding. Discussed COVID vaccination status: no. Education provided on COVID-19 vaccination as appropriate. Discussed exposure protocols and quarantine with CDC Guidelines. Patient was given an opportunity to verbalize any questions and concerns and agrees to contact ACM or health care provider for questions related to their healthcare. ACM provided contact information. No further follow-up call identified based on severity of symptoms and risk factors.

## 2021-10-13 ENCOUNTER — OFFICE VISIT (OUTPATIENT)
Dept: GYNECOLOGY | Age: 36
End: 2021-10-13
Payer: MEDICARE

## 2021-10-13 VITALS
SYSTOLIC BLOOD PRESSURE: 83 MMHG | HEART RATE: 104 BPM | DIASTOLIC BLOOD PRESSURE: 52 MMHG | BODY MASS INDEX: 38.11 KG/M2 | WEIGHT: 229 LBS

## 2021-10-13 DIAGNOSIS — R10.2 PELVIC PAIN: Primary | ICD-10-CM

## 2021-10-13 DIAGNOSIS — K66.0 INTRA-ABDOMINAL ADHESIONS: ICD-10-CM

## 2021-10-13 DIAGNOSIS — G89.29 OTHER CHRONIC PAIN: ICD-10-CM

## 2021-10-13 DIAGNOSIS — R10.84 GENERALIZED ABDOMINAL PAIN: ICD-10-CM

## 2021-10-13 DIAGNOSIS — N18.6 ESRD ON HEMODIALYSIS (HCC): ICD-10-CM

## 2021-10-13 DIAGNOSIS — Z99.2 ESRD ON HEMODIALYSIS (HCC): ICD-10-CM

## 2021-10-13 PROCEDURE — G8427 DOCREV CUR MEDS BY ELIG CLIN: HCPCS | Performed by: OBSTETRICS & GYNECOLOGY

## 2021-10-13 PROCEDURE — G8417 CALC BMI ABV UP PARAM F/U: HCPCS | Performed by: OBSTETRICS & GYNECOLOGY

## 2021-10-13 PROCEDURE — G9231 DOC ESRD DIA TRANS PREG: HCPCS | Performed by: OBSTETRICS & GYNECOLOGY

## 2021-10-13 PROCEDURE — 99214 OFFICE O/P EST MOD 30 MIN: CPT | Performed by: OBSTETRICS & GYNECOLOGY

## 2021-10-13 PROCEDURE — G8432 DEP SCR NOT DOC, RNG: HCPCS | Performed by: OBSTETRICS & GYNECOLOGY

## 2021-10-13 RX ORDER — OXYCODONE AND ACETAMINOPHEN 5; 325 MG/1; MG/1
1 TABLET ORAL
Qty: 20 TABLET | Refills: 0 | Status: SHIPPED | OUTPATIENT
Start: 2021-10-13 | End: 2021-10-21 | Stop reason: SDUPTHER

## 2021-10-13 NOTE — PROGRESS NOTES
Consult Visit to discuss surgery, pt states she met with transplant team, they state if you go ahead and do surgery, it will make it easier to replace kidney,  History of pelvis mass, pelvic pain and dysmenorrhea    1. Have you been to the ER, urgent care clinic since your last visit? Hospitalized since your last visit?  no    2. Have you seen or consulted any other health care providers outside of the [address] [place] [address] since your last visit? Include any pap smears or colon screening.    no

## 2021-10-13 NOTE — PROGRESS NOTES
[address] [facility] [address], [address]  P [phone]  F [phone]    Office Note  Patient ID:  Name:  Lenora Condon  MRN:  [id number]  :  1985/36 y.o. Date:  10/19/2021      HISTORY OF PRESENT ILLNESS:  Ms. Lenora Condon is a 29 y.o. female who initially present from Amairani Villatoro NP for a complex pelvic mass. Her history is complicated by ESRD on hemodialysis. Prior peritoneal dialysis in  complicated by intra-abdominal abscess requiring multiple drain placements. Ca-125 10.3 on 2021. MRI pelvis on 3/12/2021 demonstrated:  Multiple sequela of old peritoneal infection:  1. Complex, loculated, septated, cystic mass in the left adnexa is probably a  complicated/hemorrhagic peritoneal inclusion cyst. No concerning solid or mural  nodular enhancement. No significant change in size from 2019.  2. Larger, simple, peritoneal inclusion cyst in the anterior pelvis. 3. Tiny peritoneal inclusion cyst (versus mesonephric/Wulffian duct remnant) in  the pouch of Bethel. 4. Peritoneal adhesions and clustered, tethered, small bowel loops in the  anterior, periumbilical, right upper quadrant. 5. Right hydrosalpinx. 6. Adenomyosis. Presents back today for ER follow-up. Seen in the ER on 2021 for a 2-day history of severe abdominal pain. The patient is in the final stages of being approved for a renal transplant. She is awaiting a donor. CT A/P 2021 while in the ED was negative for any acute findings. She was again seen in the ER on 2021 for chronic pain. Mirena IUD place in 2020 for birth control. The patient reports she was mistaken about our last visit. She reports that her Mirena IUD was placed because she was having heavy abnormal uterine bleeding. She has had very minimal vaginal spotting/bleeding. She has been on add-back Aygestin which has helped her bleeding.          Initial History:  Ms. Lenora Condon is a 29 y.o.  premenopausal female who presents as a new patient from Dr. Neeta Warren and Iman Krause NP for a complex left ovarian cyst.     The patient has ESRD and is on hemodialysis on Tues/Thurs/Sat, which is secondary to Lupus. The patient reports that she is on the list for a kidney transplant. She had a Mirena IUD placed in 8/2020 secondary to abnormal uterine bleeding. She has experienced continuous spotting since that time. Patient reports a long history of ovarian cysts for which she has been seen in the ER multiple times. They are not currently bothering her. She has intermittent pain in her pelvis. Denies change in appetite or bowel habits. The patient presented to Iman Krause NP and her Mirena IUD strings were not visualized and a pelvic ultrasound was ordered. Pelvic ultrasound on 2/25/2021 demonstrated an \"abnormal, enlarged left ovary, 8.5cm x 7.5 x 7.3cm\". The patient was subsequently referred to our office for further discussion and management. Ca-125 on 2/25/2021 was 10.3    She is followed at [facility] in regard to her ESRD and need for kidney transplant. Patient has a history of prior peritoneal dialysis, although this was complicated by an intra-abdominal abscess/infection that required drainage. Pertinent PMH/PSH: SLE, ESRD on hemodialysis, on list for kidney transplant, HTN      Active, no restrictions. Imaging Review:   CT A/P 9/29/2021  FINDINGS:  There remain tethered, clumped small bowel loops in the right lower quadrant  show; these show interval increase in size and new inflammatory stranding of  adjacent mesenteric fat. There is no dilation of upstream small bowel loops. Colon and appendix are  unremarkable. Unchanged pelvic partly calcified inclusion cysts from remote PD catheter  infection. Kidneys are atrophic. No urinary tract stones are seen. There is no  hydroureteronephrosis. Bladder is empty.    Liver shows no apparent significant finding without contrast. Pancreas, adrenal  glands, spleen and aorta show no significant enlargement. There is no pneumoperitoneum, ascites or significant adenopathy. There are small uterine fibroids. IUD is in the uterus. IMPRESSION  1. The chronically tethered RLQ small bowel loops are increased in size with new  inflammatory stranding, significance uncertain but with ischemia not excluded. 2. Stable pelvic inclusion cysts    CT A/P 8/8/2021:  FINDINGS:   LOWER THORAX: No significant abnormality in the incidentally imaged lower chest.  LIVER: No mass. BILIARY TREE: Gallbladder is within normal limits. CBD is not dilated. SPLEEN: within normal limits. PANCREAS: No focal abnormality. ADRENALS: Unremarkable. KIDNEYS/URETERS: Atrophic kidneys. STOMACH: Unremarkable. SMALL BOWEL: No dilatation or wall thickening. COLON: No dilatation or wall thickening. APPENDIX: Appears normal.  PERITONEUM: No ascites or pneumoperitoneum. RETROPERITONEUM: No lymphadenopathy or aortic aneurysm. REPRODUCTIVE ORGANS: IUD in place. URINARY BLADDER: No mass or calculus. BONES: No destructive bone lesion. ABDOMINAL WALL: No mass or hernia. ADDITIONAL COMMENTS: N/A  IMPRESSION  No acute findings seen. MRI 3/12/2021:  FINDINGS:   Left adnexal mass: In the left adnexa, a circumscribed, septated, heterogeneous  mass measures 56 x 60 x 45 mm. Internal septations wall it off into multiple  different loculations. The loculations are predominantly comprised of fluid,  with mixed signal intensities. In at least three of the loculations posteriorly, there is T1 hyperintensity  (13-59) with heterogeneous, intermediate T2 signal intensity (9-15), consistent  with hemorrhage. The largest, anterior loculation is simple fluid signal  intensity. The thin septations enhance, though there are no thick septations or  enhancing nodular projections. On CT, there are punctate calcifications in the  septations.  By sonography, the septations are also thin, and the loculations are  essentially anechoic. The left ovary is not separately visible from this mass. On 12/12/2017, there was a peritoneal dialysis catheter in place in the left  lower quadrant, with a large volume of ascites (more than expected for  peritoneal dialysis). On 3/11/2018, there was a loculated mass of ascites  filling much of the abdomen and pelvis. This was subsequently drained. On  11/16/2018, there was a much smaller, loculated fluid collection in the  anteroinferior pelvis and left adnexa. On 3/4/2019, a left adnexal mass measured  54 x 69 x 43 mm; a subtle heme-fluid level within it (on coronal images)  indicated hemorrhage. Between 2019 and 2021, it has fluctuated somewhat in size. Given MRI findings, history, and historic CT findings, the left adnexal,  complex, loculated, cystic mass is probably a peritoneal inclusion cyst,  complicated by hemorrhage. Midline simple fluid sac: Immediately contiguous with the anterosuperior margin  of the left adnexal mass, a lobulated, circumscribed, simple fluid, nonenhancing  sac in the anterior midline pelvis measures 61 x 121 x 88 mm. A pseudopodic  extension lies along the distal ileum and cecum anteriorly (11-1, 11-2, 5-7). This is likely a simple peritoneal inclusion cyst. It has been present to some  degree since at least 3/28/2020. Right hydrosalpinx: In the right adnexa, the right fallopian tube is dilated  with simple fluid (11-6, 11-7, 9-7). The right fallopian tube is immediately  contiguous with and inseparable from the midline simple fluid sac as well. The  right ovary is stretched along the posterior aspect of the midline peritoneal  inclusion cyst (10-6), but is normal in size (9-9). Uterus and vagina: Exophytic from the uterine fundus anteriorly, there are  three, immediately adjacent, small, pedunculated, subserosal fibroids (11-6,  9-19). An IUD is in appropriate position in the endometrial cavity.  The  junctional zone is diffusely thickened. This is greatest posteriorly, where the  junctional zone measures 10-16 mm in thickness. Adenomyosis is suspected; no  subendometrial cystic foci to further confirm this. There is a  scar in  the anterior wall of the lower uterine segment (18-49). Incidental note is made  of multiple cervical nabothian cysts. A small cyst in the right anterolateral, inferior vagina is consistent with a  Cassidy duct cyst. A septated cystic lesion on the left, posterior to the  superior vagina and cervix (10-15, 11-23) may also be a mesonephric/Wulffian  duct remnant, as it appears to be within the vaginal wall (15-76). However,  given the other peritoneal inclusion cysts and the history, this is probably an  additional, small, peritoneal inclusion cyst (with mass effect on the vaginal  wall rather than intramuscular inclusion). In either event, it is of no clinical  significance. Pelvis: Several loops of small bowel are clustered and tethered to the right  anterior abdominal wall in the right upper quadrant, immediately superolateral  to the umbilicus. See sagittal image 10-7 today and images 602-73 and 601-43 on  CT abdomen pelvis 3/6/2021. Associated wedge-shaped, T2 hypointensity between  the bowel loops and the anterior abdominal wall (10-8) is consistent with  scarring/peritoneal adhesions. The visualized portions of the appendix, colon,  rectum, and decompressed bladder are normal. No pelvic lymphadenopathy. No free  pelvic fluid. The bone marrow is heterogeneously fatty infiltrated. IMPRESSION  Multiple sequela of old peritoneal infection:  1. Complex, loculated, septated, cystic mass in the left adnexa is probably a  complicated/hemorrhagic peritoneal inclusion cyst. No concerning solid or mural  nodular enhancement. No significant change in size from 2019.  2. Larger, simple, peritoneal inclusion cyst in the anterior pelvis.   3. Tiny peritoneal inclusion cyst (versus mesonephric/Wulffian duct remnant) in  the pouch of Adiel Kasal. 4. Peritoneal adhesions and clustered, tethered, small bowel loops in the  anterior, periumbilical, right upper quadrant. 5. Right hydrosalpinx. 6. Adenomyosis. CT A/P 3/6/2021:   FINDINGS:  LUNG BASES:No mass lesion or effusion. LIVER: No mass or biliary dilatation. There is no intrahepatic duct dilatation. There is no hepatic parenchymal mass. Hepatic enhancement pattern is within  normal limits. Portal vein is patent. GALLBLADDER:  No dilatation or wall thickening. SPLEEN/PANCREAS: No mass. There is no pancreatic duct dilatation. There is no  evidence of splenomegaly. ADRENALS/KIDNEYS: Renal atrophy. Patient has a history of end-stage renal  disease. There is no hydronephrosis. There is no renal mass. There is no  perinephric mass. STOMACH: No dilatation or wall thickening. COLON AND SMALL BOWEL: Fecal stasis There is no free intraperitoneal air. There  is no evidence of incarceration or obstruction. No mesenteric adenopathy. PERITONEUM: Unremarkable. APPENDIX: Unremarkable. BLADDER/REPRODUCTIVE ORGANS: There are bilateral adnexal cystic lesions as well  as a cystic lesion anterior and superior to the uterus; conglomerate measurement  is approximately 6.9 cm by 5.4 cm. There is no free intraperitoneal gas or  fluid. There is no focal fluid collection to suggest abscess. IUD is present  within the uterus. IMPRESSION  Stable to slightly diminished left greater than right adnexal cysts. No  associated free fluid. Patient has had adnexal cyst visualized on CT scans going  back 2 2020. Renal atrophy and a patient with a history of end-stage renal disease. Imaging follow-up as clinically warranted    Pelvic ultrasound 2/25/2021:  Impression: Normal uterus with intrauterine device visualized in the appropriate intrauterine location.  Left ovary enlarged with 8cm complex left ovarian cyst. Right adnexa to midline with large fluid collection vs right ovarian cyst; however no clearly definable right ovarian tissue noted. ROS:  A comprehensive review of systems was negative except for that written in the History of Present Illness. , 10 point ROS    OB/GYN ROS:  Patient denies significant menstrual problems. ECOG ndGndrndanddndend:nd nd2nd Problem List:  Patient Active Problem List    Diagnosis Date Noted    Chronic pain     Opioid use, unspecified with unspecified opioid-induced disorder 08/10/2021    Opioid dependence with unspecified opioid-induced disorder 08/10/2021    Opioid dependence, uncomplicated 18/89/6348    Pelvic pain 05/27/2021    Dysmenorrhea 05/20/2021    Pelvic mass 03/16/2021    Severe obesity ([address] 11/19/2020    Seizure ([address] 10/18/2019    ESRD on hemodialysis ([address] 08/27/2018    Intra-abdominal adhesions 05/12/2018    Other constipation 04/04/2018    History of pulmonary embolism 12/08/2017    Anemia of renal disease 02/21/2017    ACP (advance care planning) 02/21/2017    Malignant hypertension 07/11/2016    Lupus nephritis ([address] 03/10/2012     PMH:  Past Medical History:   Diagnosis Date    Anemia     secondary to lupus    Asthma     no inhaler use in past 2 to 3 years    Carditis     Chronic kidney disease     ESRD    Chronic pain     DDD (degenerative disc disease), lumbar     ESRD (end stage renal disease) ([address]     Fibroid tumor     GERD (gastroesophageal reflux disease)     HCAP (healthcare-associated pneumonia) 7/16/2019    Hemodialysis patient ([address] 12/21/2017    [address]  Tuesday,  Thursday,  and Saturday.      Hypercholesterolemia     Hyperkalemia 3/4/2019    Hypertension     Hypokalemia 10/27/2017    Intra-abdominal abscess ([address] 5/12/2018    Intractable nausea and vomiting 10/21/2015    Long term (current) use of anticoagulants     Lupus (systemic lupus erythematosus) (HCC)     Malignant hypertension with chronic kidney disease stage V ([address]     Peritoneal dialysis status ([address] 10/2015    x 2 years Stopped 2017 due to infection and removed.  Peritonitis ([address] 3/11/2018    Pneumonia 3/14/2020    Poor historian 2018    With medications    Sepsis ([address] 2018    Thromboembolus ([facility] [address] 2013    lungs    Transfusion history     Last Transfusion 2017  at [facility]      [address]:  Past Surgical History:   Procedure Laterality Date    HX  SECTION  11/2006    x1    HX OTHER SURGICAL  9/16/15    INSERTION PD CATH; Removed 2017    HX VASCULAR ACCESS Right 2017    Double-Lumen henry catheter upper chest    HX VASCULAR ACCESS Right 2018    right upper arm      Social History:  Social History     Tobacco Use    Smoking status: Never Smoker    Smokeless tobacco: Never Used   Substance Use Topics    Alcohol use: No      Family History:  Family History   Problem Relation Age of Onset    Diabetes Father     Hypertension Father     Cancer Other         aunt with breast cancerX2    Diabetes Mother       Medications: (reviewed)  Current Outpatient Medications   Medication Sig    oxyCODONE-acetaminophen (PERCOCET) 5-325 mg per tablet Take 1 Tablet by mouth every four (4) hours as needed for Pain for up to 14 days. Max Daily Amount: 6 Tablets.  traMADoL (ULTRAM) 50 mg tablet TAKE 1 TABLET BY MOUTH EVERY 6 HOURS AS NEEDED FOR PAIN. MAX DAILY AMOUNT: 200 MG    norethindrone acetate (AYGESTIN) 5 mg tablet TAKE 1 TABLET BY MOUTH DAILY    oxyCODONE IR (ROXICODONE) 5 mg immediate release tablet Take 5 mg by mouth every four (4) hours as needed for Pain.  acetaminophen (TYLENOL) 500 mg tablet Take  by mouth every six (6) hours as needed for Pain.  norethindrone acetate (AYGESTIN) 5 mg tablet TAKE 1 TABLET BY MOUTH DAILY    norethindrone acetate (AYGESTIN) 5 mg tablet Take 5 mg by mouth daily.  hydroxychloroquine sulfate (PLAQUENIL PO) Plaquenil    ondansetron (Zofran ODT) 4 mg disintegrating tablet Take 1 Tab by mouth every eight (8) hours as needed for Nausea.     pantoprazole (PROTONIX) 40 mg tablet Take 1 Tab by mouth ACB/HS.  ferric citrate (AURYXIA) 210 mg iron tablet Take 420 mg by mouth three (3) times daily (with meals).  carvedilol (COREG) 25 mg tablet Take 1 Tab by mouth two (2) times daily (with meals).  predniSONE (DELTASONE) 5 mg tablet Take 2 Tabs by mouth daily.  gemfibrozil (LOPID) 600 mg tablet Take 300 mg by mouth daily.  azaTHIOprine (IMURAN) 50 mg tablet Take 50 mg by mouth daily (after breakfast). No current facility-administered medications for this visit. Allergies: (reviewed)  Allergies   Allergen Reactions    Compazine [Prochlorperazine Edisylate] Nausea and Vomiting and Palpitations     \"hot and lightheaded\"        Gyn History:   Last pap: ASCUS 2/9/2020, HR HPV negative  Contraception: Mirena IUD 8/2020    OBJECTIVE:    Physical Exam:  VITAL SIGNS: Vitals:    10/13/21 1133   BP: (!) 83/52   Pulse: (!) 104   Weight: 229 lb (103.9 kg)     Body mass index is 38.11 kg/m². GENERAL ELTON: Conversant, alert, oriented. No acute distress. HEENT: HEENT. No thyroid enlargement. No JVD. Neck: Supple without restrictions. RESPIRATORY: Clear to auscultation and percussion to the bases. No CVAT. CARDIOVASC: RRR without murmur/rub. GASTROINT: soft, non-tender, without masses or organomegaly   MUSCULOSKEL: no joint tenderness, deformity or swelling       EXTREMITIES: extremities normal, atraumatic, no cyanosis or edema   PELVIC: Deferred today for discussion. Exam chaperoned by nurse. Normal appearing external genitalia. On speculum exam, normal appearing vagina but could not visualize cervix. On bimanual exam, the cervix and uterus are normal size and somewhat mobile. The pelvis is full, but could not discern borders of masses noted on ultrasound. No evidence of nodularity. RECTAL: deferred   MAX SURVEY: No suspicious lymphadenopathy or edema noted. NEURO: Grossly intact. No acute deficit.        Lab Date as available:    Lab Results Component Value Date/Time    WBC 4.5 09/29/2021 05:45 PM    HGB 7.5 (L) 09/29/2021 05:45 PM    HCT 24.2 (L) 09/29/2021 05:45 PM    PLATELET 453 75/79/8140 05:45 PM    MCV 97.2 09/29/2021 05:45 PM     Lab Results   Component Value Date/Time    Sodium 140 09/29/2021 05:45 PM    Potassium 3.9 09/29/2021 05:45 PM    Chloride 99 09/29/2021 05:45 PM    CO2 30 09/29/2021 05:45 PM    Anion gap 11 09/29/2021 05:45 PM    Glucose 110 (H) 09/29/2021 05:45 PM    BUN 18 09/29/2021 05:45 PM    Creatinine 5.46 (H) 09/29/2021 05:45 PM    BUN/Creatinine ratio 3 (L) 09/29/2021 05:45 PM    GFR est AA 11 (L) 09/29/2021 05:45 PM    GFR est non-AA 9 (L) 09/29/2021 05:45 PM    Calcium 8.6 09/29/2021 05:45 PM         IMPRESSION/PLAN:    Ms. Wilver Pappas is a 29 y.o. female who initially present from Zan Mccormick NP for a complex pelvic mass. Her history is complicated by ESRD on hemodialysis. Prior peritoneal dialysis in [facility] complicated by intra-abdominal abscess requiring multiple drain placements. Ca-125 10.3 on 2/25/2021. MRI pelvis on 3/12/2021 demonstrated:  Multiple sequela of old peritoneal infection:  1. Complex, loculated, septated, cystic mass in the left adnexa is probably a  complicated/hemorrhagic peritoneal inclusion cyst. No concerning solid or mural  nodular enhancement. No significant change in size from 2019.  2. Larger, simple, peritoneal inclusion cyst in the anterior pelvis. 3. Tiny peritoneal inclusion cyst (versus mesonephric/Wulffian duct remnant) in  the pouch of Bethel. 4. Peritoneal adhesions and clustered, tethered, small bowel loops in the  anterior, periumbilical, right upper quadrant. 5. Right hydrosalpinx. 6. Adenomyosis.     Problems:     Patient Active Problem List    Diagnosis Date Noted    Chronic pain     Opioid use, unspecified with unspecified opioid-induced disorder 08/10/2021    Opioid dependence with unspecified opioid-induced disorder 08/10/2021    Opioid dependence, uncomplicated 74/61/9170    Pelvic pain 05/27/2021    Dysmenorrhea 05/20/2021    Pelvic mass 03/16/2021    Severe obesity ([facility] [address] 11/19/2020    Seizure ([facility] [address] 10/18/2019    ESRD on hemodialysis ([facility] [address] 08/27/2018    Intra-abdominal adhesions 05/12/2018    Other constipation 04/04/2018    History of pulmonary embolism 12/08/2017    Anemia of renal disease 02/21/2017    ACP (advance care planning) 02/21/2017    Malignant hypertension 07/11/2016    Lupus nephritis ([facility] [address] 03/10/2012     Reviewed patient's course to date. Our prior discussion from 3/17/2021 regarding her pelvic mass is below. What is considered her pelvic mass is most likely related to peritoneal inclusion cysts and adhesive disease, rather than an ovarian mass. She presents back today for hospital follow-up. Stable CT from 9/29/2021. As we have discussed previously her intermittent pain is certainly a result of her adhesive disease. The patient reports that her transplant doctor says that she is cleared for our surgery and that our surgery may help them when they are able to do surgery. I discussed this with the patient, and I would like to review her outside physician's notes. I would also like the patient to receive a second opinion with Dr. Michelle Hagan, Surgical Oncology, [facility]. While I believe surgery may help the patient's pain, I do not believe currently it is in her best interest to do this while awaiting a renal transplant. As well, given her extensive adhesive disease, her surgery would likely have a >50% risk of requiring an exploratory laparotomy; which would delay her recovery further and possibly lead to more adhesive disease in the long-term. I discussed my concerns with the patient. While I am not opposed to surgery, I would like to review her outside records and discuss her case further with Dr. Genaro Alford. The patient will return to clinic for further discussion after we have obtained her outside records.      For now we will continue to manage her pain with Tramadol PRN and oxycodone PRN. She will continue to follow-up with our office and we will continue to prescribe her tramadol until she undergoes her renal transplant. All questions and concerns were addressed with the patient and she is comfortable with the plan. An electronic signature was used to authenticate this note. Nathaniel Brasher MD           Initial Discussion from 3/17/2021:  \"Her medical history is complicated. The patient has ESRD on hemodialysis and awaiting a kidney transplant, along with the fact that she has had prior intra-abdominal abscess/infection related to prior peritoneal dialysis. She is currently on the list awaiting a renal transplant. She does not have a donor as of yet. I have counseled the patient regarding benign, borderline, and malignant conditions. She has a history of ovarian cysts, and on a recent CT A/P 3/6/2021 the radiologist notes that her left and right ovarian cysts are stable to diminished since 2/2020. Her Ca-125 is also normal at 10.3. Overall I believe her risk of malignancy is low, likely <5%. However, I have discussed the role of surgical resection versus observation. I believe some of her cysts formation may also be simply related to peritoneal adhesions from her prior infection. MRI pelvis on 3/12/2021 demonstrates:   \"Multiple sequela of old peritoneal infection:  1. Complex, loculated, septated, cystic mass in the left adnexa is probably a  complicated/hemorrhagic peritoneal inclusion cyst. No concerning solid or mural  nodular enhancement. No significant change in size from 2019.  2. Larger, simple, peritoneal inclusion cyst in the anterior pelvis. 3. Tiny peritoneal inclusion cyst (versus mesonephric/Wulffian duct remnant) in  the pouch of Bethel. 4. Peritoneal adhesions and clustered, tethered, small bowel loops in the  anterior, periumbilical, right upper quadrant. 5. Right hydrosalpinx.   6. Adenomyosis. \"    After reviewing her MRI with Dr. Marquise Schneider, [facility], I believe the majority of her findings on ultrasound are related to her prior infections from peritoneal dialysis. While surgical resection or lysis of adhesions is possible to help with this, I do not believe this is in the patient's best interest while she is awaiting a renal transplant. The patient reports her pain is manageable. I believe the most prudent thing at this point is to keep her eligible at all times for her renal transplant, rather than having her recover from a major surgery that may require a laparotomy. As well, her Ca-125 is normal and what appears to be an ovarian mass is likely all related to peritoneal inclusion cysts. Plan to continue observation. \"

## 2021-10-19 PROBLEM — K66.0 INTRA-ABDOMINAL ADHESIONS: Status: ACTIVE | Noted: 2018-05-12

## 2021-10-21 ENCOUNTER — OFFICE VISIT (OUTPATIENT)
Dept: SURGERY | Age: 36
End: 2021-10-21
Payer: MEDICARE

## 2021-10-21 VITALS
TEMPERATURE: 98.5 F | HEART RATE: 100 BPM | BODY MASS INDEX: 38.25 KG/M2 | HEIGHT: 65 IN | SYSTOLIC BLOOD PRESSURE: 125 MMHG | WEIGHT: 229.6 LBS | OXYGEN SATURATION: 94 % | RESPIRATION RATE: 18 BRPM | DIASTOLIC BLOOD PRESSURE: 85 MMHG

## 2021-10-21 DIAGNOSIS — R10.2 PELVIC PAIN: ICD-10-CM

## 2021-10-21 DIAGNOSIS — Z99.2 ESRD ON HEMODIALYSIS (HCC): ICD-10-CM

## 2021-10-21 DIAGNOSIS — K66.0 INTESTINAL ADHESIONS: Primary | ICD-10-CM

## 2021-10-21 DIAGNOSIS — N18.6 ESRD ON HEMODIALYSIS (HCC): ICD-10-CM

## 2021-10-21 PROCEDURE — G8417 CALC BMI ABV UP PARAM F/U: HCPCS | Performed by: SURGERY

## 2021-10-21 PROCEDURE — G8510 SCR DEP NEG, NO PLAN REQD: HCPCS | Performed by: SURGERY

## 2021-10-21 PROCEDURE — G8427 DOCREV CUR MEDS BY ELIG CLIN: HCPCS | Performed by: SURGERY

## 2021-10-21 PROCEDURE — G9231 DOC ESRD DIA TRANS PREG: HCPCS | Performed by: SURGERY

## 2021-10-21 PROCEDURE — 99204 OFFICE O/P NEW MOD 45 MIN: CPT | Performed by: SURGERY

## 2021-10-21 NOTE — PROGRESS NOTES
1. Have you been to the ER, urgent care clinic since your last visit? Hospitalized since your last visit? No    2. Have you seen or consulted any other health care providers outside of the [address] [place] [address] since your last visit? Include any pap smears or colon screening.  No\

## 2021-10-21 NOTE — TELEPHONE ENCOUNTER
Pt calling to office to request a refill on pain medication, she states she saw Dr. Juan Briseno today and Dr. Soledad Ríos stated he will call you to discuss but per patient, it is ok for her to stay on her pain medicaiton    Requested Prescriptions     Pending Prescriptions Disp Refills    oxyCODONE-acetaminophen (PERCOCET) 5-325 mg per tablet 20 Tablet 0     Sig: Take 1 Tablet by mouth every four (4) hours as needed for Pain for up to 14 days. Max Daily Amount: 6 Tablets.

## 2021-10-21 NOTE — LETTER
10/25/2021    Patient: Alicja Beltre   YOB: 1985   Date of Visit: 10/21/2021     Harleen Treviño NP  [place], [address] [address]  Via In Basket    Dear KHANH Baker MD,      Thank you for referring Ms. Singh Cervantes to [facility] for evaluation. My notes for this consultation are attached. If you have questions, please do not hesitate to call me. I look forward to following your patient along with you.       Sincerely,    Jewell Hendricks MD

## 2021-10-22 RX ORDER — NORETHINDRONE 5 MG/1
5 TABLET ORAL DAILY
Qty: 30 TABLET | Refills: 0 | Status: SHIPPED | OUTPATIENT
Start: 2021-10-22 | End: 2021-11-30

## 2021-10-22 RX ORDER — OXYCODONE AND ACETAMINOPHEN 5; 325 MG/1; MG/1
1 TABLET ORAL
Qty: 20 TABLET | Refills: 0 | Status: SHIPPED | OUTPATIENT
Start: 2021-10-22 | End: 2021-11-04 | Stop reason: SDUPTHER

## 2021-10-25 DIAGNOSIS — R19.00 PELVIC MASS: ICD-10-CM

## 2021-10-25 DIAGNOSIS — R10.2 PELVIC PAIN: ICD-10-CM

## 2021-10-25 DIAGNOSIS — N94.6 DYSMENORRHEA: ICD-10-CM

## 2021-10-25 NOTE — TELEPHONE ENCOUNTER
Pt , she is requesting a refill on her tramadol, she states she wants to save the oxycodone for days that \"she really needs it\" and use the tramadol

## 2021-10-25 NOTE — PROGRESS NOTES
[facility] History and Physical    Chief Complaint: Intestinal adhesions. History of Present Illness:      May Crenshaw is a 29 y.o. female who was kindly referred by Dr. Eleazar Sawyer for evaluation of intestinal adhesions. Patient has a complex abdominal surgical history related to complications from a peritoneal hemodialysis catheter and infected ascites. She last had a laparoscopic abdominal washout and drain placement on April 12, 2018 by Dr. Cristal Dunne. The patient continues to have issues with intermittent abdominal pains. These episodes typically range from lasting just a few seconds to several minutes. She denies any nausea or vomiting. She has not had any obstructive symptoms. Her weight has been stable. On imaging she has several loops of intestine in the right lower quadrant that appear densely adhesed and slightly thickened. This has been stable over a prolonged period of time. She also has some cystic lesions in the pelvis that are thought to likely be peritoneal inclusion cysts rather than a ovarian cyst.  The patient is on a transplant list for kidney transplant given her end-stage renal disease which is secondary to lupus. The patient intermittently takes narcotics for her abdominal pain but does not take these every day. Past Medical History:   Diagnosis Date    Anemia     secondary to lupus    Asthma     no inhaler use in past 2 to 3 years    Carditis     Chronic kidney disease     ESRD    Chronic pain     DDD (degenerative disc disease), lumbar     [facility]     Fibroid tumor     GERD (gastroesophageal reflux disease)     HCAP (healthcare-associated pneumonia) 7/16/2019    Hemodialysis patient ([facility] 12/21/2017    [address]  Tuesday,  Thursday,  and Saturday.     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  Peritonitis ([address] 3/11/2018    Pneumonia 3/14/2020    Poor historian 2018    With medications    Sepsis ([address] 2018    Thromboembolus ([address] 2013    lungs    Transfusion history     Last Transfusion 2017  at [facility]       Past Surgical History:   Procedure Laterality Date    HX  SECTION  11/2006    x1    HX OTHER SURGICAL  9/16/15    INSERTION PD CATH; Removed 2017    HX VASCULAR ACCESS Right 2017    Double-Lumen henry catheter upper chest    HX VASCULAR ACCESS Right 2018    right upper arm       Social History     Socioeconomic History    Marital status: SINGLE     Spouse name: Not on file    Number of children: Not on file    Years of education: Not on file    Highest education level: Not on file   Occupational History    Not on file   Tobacco Use    Smoking status: Never Smoker    Smokeless tobacco: Never Used   Substance and Sexual Activity    Alcohol use: No    Drug use: Yes     Types: Prescription, OTC    Sexual activity: Not Currently   Other Topics Concern     Service No    Blood Transfusions No    Caffeine Concern No    Occupational Exposure No    Hobby Hazards No    Sleep Concern No    Stress Concern No    Weight Concern No    Special Diet No    Back Care Yes     Comment: low back pain    Exercise No    Bike Helmet No    Seat Belt Yes    Self-Exams No   Social History Narrative    Lives with parents and daughter.      Social Determinants of Health     Financial Resource Strain:     Difficulty of Paying Living Expenses:    Food Insecurity:     Worried About Running Out of Food in the Last Year:     [address] in the Last Year:    Transportation Needs:     Lack of Transportation (Medical):  Lack of Transportation (Non-Medical):    Physical Activity:     Days of Exercise per Week:     Minutes of Exercise per Session:    Stress:     Feeling of Stress :    Social Connections:     Frequency of Communication with Friends and Family:     Frequency of Social Gatherings with Friends and Family:     Attends Spiritism Services:     Active Member of Clubs or Organizations:     Attends Club or Organization Meetings:     Marital Status:    Intimate Partner Violence:     Fear of Current or Ex-Partner:     Emotionally Abused:     Physically Abused:     Sexually Abused:        Family History   Problem Relation Age of Onset    Diabetes Father     Hypertension Father     Cancer Other         aunt with breast cancerX2    Diabetes Mother          Current Outpatient Medications:     oxyCODONE IR (ROXICODONE) 5 mg immediate release tablet, Take 5 mg by mouth every four (4) hours as needed for Pain., Disp: , Rfl:     acetaminophen (TYLENOL) 500 mg tablet, Take  by mouth every six (6) hours as needed for Pain., Disp: , Rfl:     norethindrone acetate (AYGESTIN) 5 mg tablet, TAKE 1 TABLET BY MOUTH DAILY, Disp: 90 Tablet, Rfl: 3    hydroxychloroquine sulfate (PLAQUENIL PO), Plaquenil, Disp: , Rfl:     ondansetron (Zofran ODT) 4 mg disintegrating tablet, Take 1 Tab by mouth every eight (8) hours as needed for Nausea., Disp: 15 Tab, Rfl: 0    pantoprazole (PROTONIX) 40 mg tablet, Take 1 Tab by mouth ACB/HS. , Disp: 60 Tab, Rfl: 0    ferric citrate (AURYXIA) 210 mg iron tablet, Take 420 mg by mouth three (3) times daily (with meals). , Disp: , Rfl:     carvedilol (COREG) 25 mg tablet, Take 1 Tab by mouth two (2) times daily (with meals). , Disp: 60 Tab, Rfl: 0    predniSONE (DELTASONE) 5 mg tablet, Take 2 Tabs by mouth daily. , Disp: 30 Tab, Rfl: 0    gemfibrozil (LOPID) 600 mg tablet, Take 300 mg by mouth daily. , Disp: , Rfl:    azaTHIOprine (IMURAN) 50 mg tablet, Take 50 mg by mouth daily (after breakfast). , Disp: , Rfl:     oxyCODONE-acetaminophen (PERCOCET) 5-325 mg per tablet, Take 1 Tablet by mouth every four (4) hours as needed for Pain for up to 14 days. Max Daily Amount: 6 Tablets. , Disp: 20 Tablet, Rfl: 0    norethindrone acetate (AYGESTIN) 5 mg tablet, TAKE 1 TABLET BY MOUTH DAILY, Disp: 30 Tablet, Rfl: 0    Allergies   Allergen Reactions    Compazine [Prochlorperazine Edisylate] Nausea and Vomiting and Palpitations     \"hot and lightheaded\"       ROS   Constitutional: Negative  Ears, Nose, Mouth, Throat, and Face: Negative  Respiratory: Negative  Cardiovascular: Negative  Gastrointestinal: As in HPI  Genitourinary: ESRD  Integument/Breast: Negative  Hematologic/Lymphatic: Negative  Behavioral/Psychiatric: Negative  Allergic/Immunologic: Systemic lupus      Physical Exam:     Visit Vitals  /85 (BP 1 Location: Left upper arm, BP Patient Position: Sitting, BP Cuff Size: Adult long)   Pulse 100   Temp 98.5 °F (36.9 °C) (Oral)   Resp 18   Ht 5' 5\" (1.651 m)   Wt 229 lb 9.6 oz (104.1 kg)   SpO2 94%   BMI 38.21 kg/m²       General - alert and oriented, no apparent distress  HEENT - NC/AT. No scleral icterus  Pulm - CTAB, normal inspiratory effort  CV - RRR, no M/R/G  Abd - soft, NT, ND. No palpable masses or organomegaly. Surgical incisions well-healed. No evidence of hernia. Ext - warm, well perfused, no edema  Lymphatics - no cervical, supraclavicular or inguinal adenopathy noted.    Skin - supple, no rashes  Psychiatric - normal affect, good mood    Labs  Lab Results   Component Value Date/Time    WBC 4.5 09/29/2021 05:45 PM    Hemoglobin (POC) 7.8 (L) 01/19/2018 12:30 PM    HGB 7.5 (L) 09/29/2021 05:45 PM    Hematocrit (POC) 42 10/18/2019 11:28 AM    HCT 24.2 (L) 09/29/2021 05:45 PM    PLATELET 069 96/95/2921 05:45 PM    MCV 97.2 09/29/2021 05:45 PM     Lab Results   Component Value Date/Time    Sodium 140 09/29/2021 05:45 PM    Potassium 3.9 09/29/2021 05:45 PM    Chloride 99 09/29/2021 05:45 PM    CO2 30 09/29/2021 05:45 PM    Anion gap 11 09/29/2021 05:45 PM    Glucose 110 (H) 09/29/2021 05:45 PM    BUN 18 09/29/2021 05:45 PM    Creatinine 5.46 (H) 09/29/2021 05:45 PM    BUN/Creatinine ratio 3 (L) 09/29/2021 05:45 PM    GFR est AA 11 (L) 09/29/2021 05:45 PM    GFR est non-AA 9 (L) 09/29/2021 05:45 PM    Calcium 8.6 09/29/2021 05:45 PM    Bilirubin, total 0.2 09/29/2021 05:45 PM    Alk. phosphatase 59 09/29/2021 05:45 PM    Protein, total 7.6 09/29/2021 05:45 PM    Albumin 2.7 (L) 09/29/2021 05:45 PM    Globulin 4.9 (H) 09/29/2021 05:45 PM    A-G Ratio 0.6 (L) 09/29/2021 05:45 PM    ALT (SGPT) 12 09/29/2021 05:45 PM    AST (SGOT) 19 09/29/2021 05:45 PM         Imaging  CT Results (most recent):  Results from [place] encounter on 09/29/21    CT ABD PELV WO CONT    Narrative  INDICATION: abd pain, known pelvic mass    COMPARISON: 8/9/2021. EXAM: CT Abdomen and Pelvis without IV contrast. No oral contrast.  CT dose reduction was achieved through use of a standardized protocol tailored  for this examination and automatic exposure control for dose modulation. FINDINGS:  There remain tethered, clumped small bowel loops in the right lower quadrant  show; these show interval increase in size and new inflammatory stranding of  adjacent mesenteric fat. There is no dilation of upstream small bowel loops. Colon and appendix are  unremarkable. Unchanged pelvic partly calcified inclusion cysts from remote PD catheter  infection. Kidneys are atrophic. No urinary tract stones are seen. There is no  hydroureteronephrosis. Bladder is empty. Liver shows no apparent significant finding without contrast. Pancreas, adrenal  glands, spleen and aorta show no significant enlargement. There is no pneumoperitoneum, ascites or significant adenopathy. There are small uterine fibroids.  IUD is in the uterus. Impression  1. The chronically tethered RLQ small bowel loops are increased in size with new  inflammatory stranding, significance uncertain but with ischemia not excluded. 2. Stable pelvic inclusion cysts. I have reviewed and agree with all of the pertinent images    Assessment:     Margareth Recio is a 29 y.o. female with history of multiple abdominal operations related to complications of infected ascites from a peritoneal dialysis catheter. She now has intermittent abdominal pains. Recommendations:     1. It is hard to say if the patient's symptoms are related to adhesions. I recommended not operating unless she has obstructive issues or constant pain/pain that is not manageable with medical management. She is high risk for significant complications with any abdominal surgical exploration including need for conversion open, need for bowel resection, infection, fistula formation, abscess, wound dehiscence and more given her prior surgical history and medical comorbidities. Any of this could complicate her ability to get a renal transplant, thus I would avoid surgery unless necessary to allow for her to get a transplant. I discussed this patient with Dr. Rufus Graham who also was in agreement. 40 mins of time was spent with the patient of which > 50% of the time involved face-to-face counseling of the patient regarding the proposed treatment plan. Arash Hernandez MD  10/21/2021    CC:  KHANH Cardozo Dr.

## 2021-10-26 RX ORDER — TRAMADOL HYDROCHLORIDE 50 MG/1
50 TABLET ORAL
Qty: 90 TABLET | Refills: 0 | Status: SHIPPED | OUTPATIENT
Start: 2021-10-26 | End: 2021-12-24

## 2021-10-29 ENCOUNTER — TELEPHONE (OUTPATIENT)
Dept: NEUROLOGY | Age: 36
End: 2021-10-29

## 2021-10-29 DIAGNOSIS — G47.00 INSOMNIA, UNSPECIFIED TYPE: Primary | ICD-10-CM

## 2021-10-29 NOTE — TELEPHONE ENCOUNTER
----- Message from Wilnette Riedel sent at 10/29/2021 12:41 PM EDT -----  Regarding: Dr. Alcides Parks Message/Vendor Calls    Caller's first and last name: Self      Reason for call: Patient needs to speak to the nurse      Callback required yes/no and why: Yes the patient needs to speak to the nurse      Best contact number(s): [phone]      Details to clarify the request: The patient is requesting to speak to the nurse. Please have Dr. Keiko Snow nurse to call the patient.  Thank you      Wilnette Riedel

## 2021-10-29 NOTE — TELEPHONE ENCOUNTER
Patient called for a refill on amitripyline I do not see it as a current med and she states she still takes it for sleep.

## 2021-11-01 RX ORDER — AMITRIPTYLINE HYDROCHLORIDE 50 MG/1
150 TABLET, FILM COATED ORAL
Qty: 270 TABLET | Refills: 0 | Status: SHIPPED | OUTPATIENT
Start: 2021-11-01 | End: 2021-12-09

## 2021-11-02 ENCOUNTER — PATIENT MESSAGE (OUTPATIENT)
Dept: NEUROLOGY | Age: 36
End: 2021-11-02

## 2021-11-04 ENCOUNTER — TELEPHONE (OUTPATIENT)
Dept: GYNECOLOGY | Age: 36
End: 2021-11-04

## 2021-11-04 DIAGNOSIS — Z99.2 ESRD ON HEMODIALYSIS (HCC): ICD-10-CM

## 2021-11-04 DIAGNOSIS — N18.6 ESRD ON HEMODIALYSIS (HCC): ICD-10-CM

## 2021-11-04 DIAGNOSIS — R10.2 PELVIC PAIN: ICD-10-CM

## 2021-11-09 RX ORDER — OXYCODONE AND ACETAMINOPHEN 5; 325 MG/1; MG/1
1 TABLET ORAL
Qty: 20 TABLET | Refills: 0 | Status: SHIPPED | OUTPATIENT
Start: 2021-11-09 | End: 2021-12-13 | Stop reason: SDUPTHER

## 2021-11-30 RX ORDER — NORETHINDRONE 5 MG/1
5 TABLET ORAL DAILY
Qty: 90 TABLET | Refills: 5 | Status: SHIPPED | OUTPATIENT
Start: 2021-11-30

## 2021-11-30 RX ORDER — NORETHINDRONE 5 MG/1
5 TABLET ORAL DAILY
Qty: 30 TABLET | Refills: 0 | Status: SHIPPED | OUTPATIENT
Start: 2021-11-30 | End: 2021-11-30

## 2021-12-09 ENCOUNTER — OFFICE VISIT (OUTPATIENT)
Dept: NEUROLOGY | Age: 36
End: 2021-12-09
Payer: MEDICARE

## 2021-12-09 VITALS
DIASTOLIC BLOOD PRESSURE: 78 MMHG | HEIGHT: 65 IN | RESPIRATION RATE: 16 BRPM | HEART RATE: 90 BPM | OXYGEN SATURATION: 99 % | WEIGHT: 221 LBS | SYSTOLIC BLOOD PRESSURE: 130 MMHG | BODY MASS INDEX: 36.82 KG/M2

## 2021-12-09 DIAGNOSIS — G47.00 INSOMNIA, UNSPECIFIED TYPE: Primary | ICD-10-CM

## 2021-12-09 DIAGNOSIS — R56.9 SINGLE SEIZURE (HCC): ICD-10-CM

## 2021-12-09 PROCEDURE — G9231 DOC ESRD DIA TRANS PREG: HCPCS | Performed by: PSYCHIATRY & NEUROLOGY

## 2021-12-09 PROCEDURE — 99215 OFFICE O/P EST HI 40 MIN: CPT | Performed by: PSYCHIATRY & NEUROLOGY

## 2021-12-09 PROCEDURE — G8510 SCR DEP NEG, NO PLAN REQD: HCPCS | Performed by: PSYCHIATRY & NEUROLOGY

## 2021-12-09 PROCEDURE — G8417 CALC BMI ABV UP PARAM F/U: HCPCS | Performed by: PSYCHIATRY & NEUROLOGY

## 2021-12-09 PROCEDURE — G8427 DOCREV CUR MEDS BY ELIG CLIN: HCPCS | Performed by: PSYCHIATRY & NEUROLOGY

## 2021-12-09 RX ORDER — AMITRIPTYLINE HYDROCHLORIDE 150 MG/1
150 TABLET, FILM COATED ORAL
Qty: 90 TABLET | Refills: 1 | Status: SHIPPED | OUTPATIENT
Start: 2021-12-09 | End: 2022-03-09

## 2021-12-09 NOTE — PROGRESS NOTES
Chief Complaint   Patient presents with    Follow-up     6 months for insomnia, patient states she is s/p abdominal surgery for an ovarian cyst which ruptured       Visit Vitals  /78 (BP 1 Location: Left upper arm, BP Patient Position: Sitting)   Pulse 90   Resp 16   Ht 5' 5\" (1.651 m)   Wt 100.2 kg (221 lb)   SpO2 99%   BMI 36.78 kg/m²

## 2021-12-09 NOTE — PROGRESS NOTES
Ru Coleman (1985) is a 29 y.o. female, established patient, here for evaluation of the following     Chief complaint(s):   Chief Complaint   Patient presents with    Follow-up     6 months for insomnia, patient states she is s/p abdominal surgery for an ovarian cyst which ruptured       SUBJECTIVE/ OBJECTIVE:    HPI: 29 y.o. female ESRD/ HD    1) Chronic insomnia  Taking Amitriptyline 50 mg three tabs QHS  Helping her sleep, denies daytime side effect  Pt wants to continue same dose    2) Hx of Single Episode Seizure (Oct 2019)    EMR triggered me to look into seizure history. I reviewed EMR and discussed with patient. Per a Neurology Consult note at [facility] Dr Chapis aFrmer, on 818-19: 52-year-old woman followed by [facility] neurology admitted here for possible seizure. She does not remember the event. Apparently she was confused afterwards. according to the medical record she had a convulsive event about 4 min long after dialysis today. Foaming of the mouth. No tongue biting or incontinence. she has no history of seizures she is aware of. She feels her baseline now completely. She is encountering significant stress due to the unexpected passing of her brother due to an overdose. EEG done on 10-19-19 showed mild encephalopathic pattern, non-specific, no epileptiform discharges were seen. Brain MRI done during that admission was normal. Patient denies having any seizure-like episode prior to or after the one above in Oct 2019       Review of Systems: as above     Allergies   Allergen Reactions    Compazine [Prochlorperazine Edisylate] Nausea and Vomiting and Palpitations     \"hot and lightheaded\"         Current Outpatient Medications:     amitriptyline (ELAVIL) 150 mg tablet, Take 1 Tablet by mouth nightly for 90 days.  For chronic insomnia., Disp: 90 Tablet, Rfl: 1    norethindrone acetate (AYGESTIN) 5 mg tablet, TAKE 1 TABLET BY MOUTH DAILY, Disp: 90 Tablet, Rfl: 5    oxyCODONE IR (ROXICODONE) 5 mg immediate release tablet, Take 5 mg by mouth every four (4) hours as needed for Pain., Disp: , Rfl:     acetaminophen (TYLENOL) 500 mg tablet, Take  by mouth every six (6) hours as needed for Pain., Disp: , Rfl:     norethindrone acetate (AYGESTIN) 5 mg tablet, TAKE 1 TABLET BY MOUTH DAILY, Disp: 90 Tablet, Rfl: 3    hydroxychloroquine sulfate (PLAQUENIL PO), Plaquenil, Disp: , Rfl:     ondansetron (Zofran ODT) 4 mg disintegrating tablet, Take 1 Tab by mouth every eight (8) hours as needed for Nausea., Disp: 15 Tab, Rfl: 0    pantoprazole (PROTONIX) 40 mg tablet, Take 1 Tab by mouth ACB/HS. , Disp: 60 Tab, Rfl: 0    ferric citrate (AURYXIA) 210 mg iron tablet, Take 420 mg by mouth three (3) times daily (with meals). , Disp: , Rfl:     carvedilol (COREG) 25 mg tablet, Take 1 Tab by mouth two (2) times daily (with meals). , Disp: 60 Tab, Rfl: 0    predniSONE (DELTASONE) 5 mg tablet, Take 2 Tabs by mouth daily. , Disp: 30 Tab, Rfl: 0    gemfibrozil (LOPID) 600 mg tablet, Take 300 mg by mouth daily. , Disp: , Rfl:     azaTHIOprine (IMURAN) 50 mg tablet, Take 50 mg by mouth daily (after breakfast). , Disp: , Rfl:      has a past medical history of Anemia, Asthma, Carditis, Chronic kidney disease, Chronic pain, DDD (degenerative disc disease), lumbar, ESRD (end stage renal disease) ([facility] Fibroid tumor, GERD (gastroesophageal reflux disease), HCAP (healthcare-associated pneumonia) (7/16/2019), Hemodialysis patient ([facility] (12/21/2017), Hypercholesterolemia, Hyperkalemia (3/4/2019), Hypertension, Hypokalemia (10/27/2017), Intra-abdominal abscess ([facility] (5/12/2018), Intractable nausea and vomiting (10/21/2015), Long term (current) use of anticoagulants, Lupus (systemic lupus erythematosus) ([facility] Malignant hypertension with chronic kidney disease stage V [facility]), Peritoneal dialysis status ([facility] (10/2015), Peritonitis ([facility] (3/11/2018), Pneumonia (3/14/2020), Poor historian (2018), Sepsis ([facility] (2018), Thromboembolus ([facility] (), and Transfusion history. She also has no past medical history of Adverse effect of anesthesia, Difficult intubation, Malignant hyperthermia due to anesthesia, or Pseudocholinesterase deficiency. has a past surgical history that includes hx  section (2006); hx other surgical (9/16/15); hx vascular access (Right, 2017); and hx vascular access (Right, ). Physical Exam:    Vitals:    21 1042   BP: 130/78   BP 1 Location: Left upper arm   BP Patient Position: Sitting   Pulse: 90   Resp: 16   Height: 5' 5\" (1.651 m)   Weight: 100.2 kg (221 lb)   SpO2: 99%     Awake, alert, conversant  EOMI, hearing/ speech normal, VF normal bilateral  Stands, ambulates without difficulty     ========================================    ASSESSMENT/ PLAN:       ICD-10-CM ICD-9-CM    1. Insomnia, unspecified type  G47.00 780.52 amitriptyline (ELAVIL) 150 mg tablet   2. Single seizure ([facility]  R56.9 780.39         1) Insomnia  Continue Amitriptyline 150 mg one tablet QHS for chronic insomnia. Sent 90 day Rx + 1 RF    2) Hx of single seizure in Oct 2019  Cause unclear. None before or after  No indication to add AED today    3) F/u in 5 months      An electronic signature was used to authenticate this note.   -- Celia Epps MD

## 2021-12-13 DIAGNOSIS — Z99.2 ESRD ON HEMODIALYSIS (HCC): ICD-10-CM

## 2021-12-13 DIAGNOSIS — R10.2 PELVIC PAIN: ICD-10-CM

## 2021-12-13 DIAGNOSIS — N18.6 ESRD ON HEMODIALYSIS (HCC): ICD-10-CM

## 2021-12-13 NOTE — TELEPHONE ENCOUNTER
Pt calling to office for a refill on her percocet    Requested Prescriptions     Pending Prescriptions Disp Refills    oxyCODONE-acetaminophen (PERCOCET) 5-325 mg per tablet 20 Tablet 0     Sig: Take 1 Tablet by mouth every four (4) hours as needed for Pain for up to 14 days. Max Daily Amount: 6 Tablets.

## 2021-12-14 RX ORDER — OXYCODONE AND ACETAMINOPHEN 5; 325 MG/1; MG/1
1 TABLET ORAL
Qty: 20 TABLET | Refills: 0 | Status: SHIPPED | OUTPATIENT
Start: 2021-12-14 | End: 2021-12-27 | Stop reason: SDUPTHER

## 2021-12-24 DIAGNOSIS — R10.2 PELVIC PAIN: ICD-10-CM

## 2021-12-24 DIAGNOSIS — N94.6 DYSMENORRHEA: ICD-10-CM

## 2021-12-24 DIAGNOSIS — R19.00 PELVIC MASS: ICD-10-CM

## 2021-12-24 RX ORDER — TRAMADOL HYDROCHLORIDE 50 MG/1
TABLET ORAL
Qty: 90 TABLET | Refills: 0 | Status: SHIPPED | OUTPATIENT
Start: 2021-12-24 | End: 2021-12-27 | Stop reason: ALTCHOICE

## 2021-12-27 ENCOUNTER — TELEPHONE (OUTPATIENT)
Dept: GYNECOLOGY | Age: 36
End: 2021-12-27

## 2021-12-27 DIAGNOSIS — R10.2 PELVIC PAIN: ICD-10-CM

## 2021-12-27 DIAGNOSIS — Z99.2 ESRD ON HEMODIALYSIS (HCC): ICD-10-CM

## 2021-12-27 DIAGNOSIS — N18.6 ESRD ON HEMODIALYSIS (HCC): ICD-10-CM

## 2021-12-27 RX ORDER — OXYCODONE AND ACETAMINOPHEN 5; 325 MG/1; MG/1
1 TABLET ORAL
Qty: 20 TABLET | Refills: 0 | Status: SHIPPED | OUTPATIENT
Start: 2021-12-27 | End: 2022-01-11 | Stop reason: SDUPTHER

## 2022-01-09 ENCOUNTER — APPOINTMENT (OUTPATIENT)
Dept: CT IMAGING | Age: 37
End: 2022-01-09
Attending: STUDENT IN AN ORGANIZED HEALTH CARE EDUCATION/TRAINING PROGRAM
Payer: MEDICARE

## 2022-01-09 ENCOUNTER — HOSPITAL ENCOUNTER (EMERGENCY)
Age: 37
Discharge: HOME | End: 2022-01-09
Attending: EMERGENCY MEDICINE | Admitting: EMERGENCY MEDICINE
Payer: MEDICARE

## 2022-01-09 VITALS
HEART RATE: 69 BPM | DIASTOLIC BLOOD PRESSURE: 90 MMHG | TEMPERATURE: 97.2 F | SYSTOLIC BLOOD PRESSURE: 157 MMHG | OXYGEN SATURATION: 95 % | RESPIRATION RATE: 18 BRPM

## 2022-01-09 DIAGNOSIS — R56.9 SEIZURE-LIKE ACTIVITY (HCC): Primary | ICD-10-CM

## 2022-01-09 LAB
ALBUMIN SERPL-MCNC: 2.7 G/DL (ref 3.5–5)
ALBUMIN/GLOB SERPL: 0.6 {RATIO} (ref 1.1–2.2)
ALP SERPL-CCNC: 65 U/L (ref 45–117)
ALT SERPL-CCNC: 12 U/L (ref 12–78)
ANION GAP SERPL CALC-SCNC: 8 MMOL/L (ref 5–15)
AST SERPL-CCNC: 12 U/L (ref 15–37)
BASOPHILS # BLD: 0 K/UL (ref 0–0.1)
BASOPHILS NFR BLD: 0 % (ref 0–1)
BILIRUB SERPL-MCNC: 0.3 MG/DL (ref 0.2–1)
BUN SERPL-MCNC: 27 MG/DL (ref 6–20)
BUN/CREAT SERPL: 3 (ref 12–20)
CALCIUM SERPL-MCNC: 9 MG/DL (ref 8.5–10.1)
CHLORIDE SERPL-SCNC: 105 MMOL/L (ref 97–108)
CO2 SERPL-SCNC: 25 MMOL/L (ref 21–32)
CREAT SERPL-MCNC: 8.53 MG/DL (ref 0.55–1.02)
DIFFERENTIAL METHOD BLD: ABNORMAL
EOSINOPHIL # BLD: 0 K/UL (ref 0–0.4)
EOSINOPHIL NFR BLD: 0 % (ref 0–7)
ERYTHROCYTE [DISTWIDTH] IN BLOOD BY AUTOMATED COUNT: 16.2 % (ref 11.5–14.5)
GLOBULIN SER CALC-MCNC: 4.9 G/DL (ref 2–4)
GLUCOSE SERPL-MCNC: 74 MG/DL (ref 65–100)
HCT VFR BLD AUTO: 29.7 % (ref 35–47)
HGB BLD-MCNC: 8.9 G/DL (ref 11.5–16)
IMM GRANULOCYTES # BLD AUTO: 0 K/UL (ref 0–0.04)
IMM GRANULOCYTES NFR BLD AUTO: 0 % (ref 0–0.5)
LYMPHOCYTES # BLD: 0.6 K/UL (ref 0.8–3.5)
LYMPHOCYTES NFR BLD: 17 % (ref 12–49)
MAGNESIUM SERPL-MCNC: 2.2 MG/DL (ref 1.6–2.4)
MCH RBC QN AUTO: 29.9 PG (ref 26–34)
MCHC RBC AUTO-ENTMCNC: 30 G/DL (ref 30–36.5)
MCV RBC AUTO: 99.7 FL (ref 80–99)
MONOCYTES # BLD: 0.2 K/UL (ref 0–1)
MONOCYTES NFR BLD: 6 % (ref 5–13)
NEUTS SEG # BLD: 2.6 K/UL (ref 1.8–8)
NEUTS SEG NFR BLD: 77 % (ref 32–75)
NRBC # BLD: 0 K/UL (ref 0–0.01)
NRBC BLD-RTO: 0 PER 100 WBC
PLATELET # BLD AUTO: 162 K/UL (ref 150–400)
PMV BLD AUTO: 10.2 FL (ref 8.9–12.9)
POTASSIUM SERPL-SCNC: 4.2 MMOL/L (ref 3.5–5.1)
PROT SERPL-MCNC: 7.6 G/DL (ref 6.4–8.2)
RBC # BLD AUTO: 2.98 M/UL (ref 3.8–5.2)
RBC MORPH BLD: ABNORMAL
RBC MORPH BLD: ABNORMAL
SODIUM SERPL-SCNC: 138 MMOL/L (ref 136–145)
WBC # BLD AUTO: 3.4 K/UL (ref 3.6–11)

## 2022-01-09 PROCEDURE — 70450 CT HEAD/BRAIN W/O DYE: CPT

## 2022-01-09 PROCEDURE — 85025 COMPLETE CBC W/AUTO DIFF WBC: CPT

## 2022-01-09 PROCEDURE — 99283 EMERGENCY DEPT VISIT LOW MDM: CPT

## 2022-01-09 PROCEDURE — 80053 COMPREHEN METABOLIC PANEL: CPT

## 2022-01-09 PROCEDURE — 83735 ASSAY OF MAGNESIUM: CPT

## 2022-01-09 NOTE — ED TRIAGE NOTES
Pt arrives via EMS after she fell OOB and EMS reports she was shaking and not responding. Pt does not recall events. Pt A&O and does not appear postictal at this time. No hx of sz.

## 2022-01-09 NOTE — ED PROVIDER NOTES
Brenda Diaz is a 28 yo F with h/oHTN, Lupus ESRD on HD on TThS who presents to the ED by EMS following seizure like activity. History provided by patient as well as her mother by phone. Her mother states that she heard her fall out of bed and found her on the floor shaking like she was having a seizure. Her mother rand to get [facility] phone to call 911 and by the time she returned the shaking had stopped but she was sleepy and not responsive. By the time EMS arrived she was alert. She denies history of prior seizure but does see a neurologist, Dr. Maira Nuñez, for management of her Lupus pain. She denies urinary incontinence. Past Medical History:   Diagnosis Date    Anemia     secondary to lupus    Asthma     no inhaler use in past 2 to 3 years    Carditis     Chronic kidney disease     ESRD    Chronic pain     DDD (degenerative disc disease), lumbar     ESRD (end stage renal disease) ([address]     Fibroid tumor     GERD (gastroesophageal reflux disease)     HCAP (healthcare-associated pneumonia) 2019    Hemodialysis patient ([address] 2017    [address]  Tuesday,  Thursday,  and Saturday. 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     Peritonitis ([address] 3/11/2018    Pneumonia 3/14/2020    Poor historian 2018    With medications    Sepsis ([address] 2018    Thromboembolus ([address]     lungs    Transfusion history     Last Transfusion 2017  at [facility]       Past Surgical History:   Procedure Laterality Date    HX  SECTION  11/2006    x1    HX OTHER SURGICAL  9/16/15    INSERTION PD CATH; Removed 12/2017    HX VASCULAR ACCESS Right 12/2017    Double-Lumen henry catheter upper chest    HX VASCULAR ACCESS Right 2018    right upper arm         Family History:   Problem Relation Age of Onset    Diabetes Father     Hypertension Father     Cancer Other         aunt with breast cancerX2    Diabetes Mother        Social History     Socioeconomic History    Marital status: SINGLE     Spouse name: Not on file    Number of children: Not on file    Years of education: Not on file    Highest education level: Not on file   Occupational History    Not on file   Tobacco Use    Smoking status: Never Smoker    Smokeless tobacco: Never Used   Substance and Sexual Activity    Alcohol use: No    Drug use: Yes     Types: Prescription, OTC    Sexual activity: Not Currently   Other Topics Concern     Service No    Blood Transfusions No    Caffeine Concern No    Occupational Exposure No    Hobby Hazards No    Sleep Concern No    Stress Concern No    Weight Concern No    Special Diet No    Back Care Yes     Comment: low back pain    Exercise No    Bike Helmet No    Seat Belt Yes    Self-Exams No   Social History Narrative    Lives with parents and daughter. Social Determinants of Health     Financial Resource Strain:     Difficulty of Paying Living Expenses: Not on file   Food Insecurity:     Worried About Running Out of Food in the Last Year: Not on file    Fede of Food in the Last Year: Not on file   Transportation Needs:     Lack of Transportation (Medical): Not on file    Lack of Transportation (Non-Medical):  Not on file   Physical Activity:     Days of Exercise per Week: Not on file    Minutes of Exercise per Session: Not on file   Stress:     Feeling of Stress : Not on file   Social Connections:     Frequency of Communication with Friends and Family: Not on file    Frequency of Social Gatherings with Friends and Family: Not on file    Attends Orthodoxy Services: Not on file    Active Member of Clubs or Organizations: Not on file    Attends Club or Organization Meetings: Not on file    Marital Status: Not on file   Intimate Partner Violence:     Fear of Current or Ex-Partner: Not on file    Emotionally Abused: Not on file    Physically Abused: Not on file    Sexually Abused: Not on file   Housing Stability:     Unable to Pay for Housing in the Last Year: Not on file    Number of [place] in the Last Year: Not on file    Unstable Housing in the Last Year: Not on file         ALLERGIES: Compazine [prochlorperazine edisylate]    Review of Systems   Constitutional: Negative for fever. HENT: Negative for sore throat. Eyes: Negative for visual disturbance. Respiratory: Negative for cough. Cardiovascular: Negative for chest pain. Gastrointestinal: Negative for abdominal pain. Genitourinary: Negative for dysuria. Musculoskeletal: Negative for back pain. Skin: Negative for rash. Neurological: Positive for seizures. Negative for headaches. Vitals:    01/09/22 1150 01/09/22 1619   BP: (!) 169/110 (!) 157/90   Pulse: 90 69   Resp: 16 18   Temp: 97.6 °F (36.4 °C) 97.2 °F (36.2 °C)   SpO2: 97% 95%            Physical Exam  Vitals and nursing note reviewed. Constitutional:       General: She is not in acute distress. Appearance: She is well-developed. HENT:      Head: Normocephalic. Contusion (right forehead) present. No abrasion or laceration. Eyes:      General: No visual field deficit. Conjunctiva/sclera: Conjunctivae normal.   Neck:      Trachea: Phonation normal.   Cardiovascular:      Rate and Rhythm: Normal rate. Pulmonary:      Effort: Pulmonary effort is normal. No respiratory distress. Breath sounds: No wheezing or rales. Abdominal:      General: There is no distension. Musculoskeletal:         General: No tenderness. Normal range of motion.       Cervical back: Normal range of motion. Skin:     General: Skin is warm and dry. Neurological:      Mental Status: She is alert. She is not disoriented. Cranial Nerves: Cranial nerves are intact. No dysarthria or facial asymmetry. Sensory: Sensation is intact. Motor: Motor function is intact. No weakness, abnormal muscle tone or seizure activity. MDM       6:09 PM  Patient reassessed and remains in no distress. Labs reviewed and are at baseline. CR 8.53, K 4.2. Patient receives dialysis.   Will discharge home, advised patient to follow-up with her neurologist and refrain from driving until cleared by her neurologist  Procedures

## 2022-01-11 DIAGNOSIS — N18.6 ESRD ON HEMODIALYSIS (HCC): ICD-10-CM

## 2022-01-11 DIAGNOSIS — R10.2 PELVIC PAIN: ICD-10-CM

## 2022-01-11 DIAGNOSIS — Z99.2 ESRD ON HEMODIALYSIS (HCC): ICD-10-CM

## 2022-01-11 RX ORDER — OXYCODONE AND ACETAMINOPHEN 5; 325 MG/1; MG/1
1 TABLET ORAL
Qty: 20 TABLET | Refills: 0 | Status: SHIPPED | OUTPATIENT
Start: 2022-01-11 | End: 2022-01-20 | Stop reason: SDUPTHER

## 2022-01-11 NOTE — TELEPHONE ENCOUNTER
Pt is calling to request a refill on her percocet    Requested Prescriptions     Pending Prescriptions Disp Refills    oxyCODONE-acetaminophen (PERCOCET) 5-325 mg per tablet 20 Tablet 0     Sig: Take 1 Tablet by mouth every four (4) hours as needed for Pain for up to 14 days. Max Daily Amount: 6 Tablets.

## 2022-01-20 DIAGNOSIS — N18.6 ESRD ON HEMODIALYSIS (HCC): ICD-10-CM

## 2022-01-20 DIAGNOSIS — Z99.2 ESRD ON HEMODIALYSIS (HCC): ICD-10-CM

## 2022-01-20 DIAGNOSIS — R10.2 PELVIC PAIN: ICD-10-CM

## 2022-01-20 NOTE — TELEPHONE ENCOUNTER
Pt calling to office for refill, still waiting every day for call for transplant, hasn't happened yet  Requested Prescriptions     Pending Prescriptions Disp Refills    oxyCODONE-acetaminophen (PERCOCET) 5-325 mg per tablet 20 Tablet 0     Sig: Take 1 Tablet by mouth every four (4) hours as needed for Pain for up to 14 days. Max Daily Amount: 6 Tablets.

## 2022-01-21 RX ORDER — OXYCODONE AND ACETAMINOPHEN 5; 325 MG/1; MG/1
1 TABLET ORAL
Qty: 20 TABLET | Refills: 0 | Status: SHIPPED | OUTPATIENT
Start: 2022-01-21 | End: 2022-01-31 | Stop reason: SDUPTHER

## 2022-01-31 DIAGNOSIS — R10.2 PELVIC PAIN: ICD-10-CM

## 2022-01-31 DIAGNOSIS — Z99.2 ESRD ON HEMODIALYSIS (HCC): ICD-10-CM

## 2022-01-31 DIAGNOSIS — N18.6 ESRD ON HEMODIALYSIS (HCC): ICD-10-CM

## 2022-01-31 RX ORDER — OXYCODONE AND ACETAMINOPHEN 5; 325 MG/1; MG/1
1 TABLET ORAL
Qty: 20 TABLET | Refills: 0 | Status: SHIPPED | OUTPATIENT
Start: 2022-01-31 | End: 2022-02-10 | Stop reason: SDUPTHER

## 2022-01-31 NOTE — TELEPHONE ENCOUNTER
Pt calling to office for a refill    Requested Prescriptions     Pending Prescriptions Disp Refills    oxyCODONE-acetaminophen (PERCOCET) 5-325 mg per tablet 20 Tablet 0     Sig: Take 1 Tablet by mouth every four (4) hours as needed for Pain for up to 14 days. Max Daily Amount: 6 Tablets.

## 2022-02-07 DIAGNOSIS — G47.00 INSOMNIA, UNSPECIFIED TYPE: ICD-10-CM

## 2022-02-07 RX ORDER — AMITRIPTYLINE HYDROCHLORIDE 50 MG/1
TABLET, FILM COATED ORAL
Qty: 270 TABLET | Refills: 0 | OUTPATIENT
Start: 2022-02-07

## 2022-02-07 NOTE — TELEPHONE ENCOUNTER
Call patient and ask her to call her pharmacy and ask them to stop requesting Amitriptyline 50 mg tablets and to take that off refill. She has been taking Amitriptyline 150 mg tablets for a while and 90 day Rx + refill was sent in / around Nov 2021.

## 2022-02-09 ENCOUNTER — HOSPITAL ENCOUNTER (EMERGENCY)
Age: 37
Discharge: HOME | End: 2022-02-09
Attending: EMERGENCY MEDICINE
Payer: MEDICARE

## 2022-02-09 ENCOUNTER — APPOINTMENT (OUTPATIENT)
Dept: GENERAL RADIOLOGY | Age: 37
End: 2022-02-09
Payer: MEDICARE

## 2022-02-09 VITALS
BODY MASS INDEX: 37.65 KG/M2 | WEIGHT: 225.97 LBS | DIASTOLIC BLOOD PRESSURE: 103 MMHG | SYSTOLIC BLOOD PRESSURE: 145 MMHG | TEMPERATURE: 98.9 F | RESPIRATION RATE: 18 BRPM | HEIGHT: 65 IN | HEART RATE: 110 BPM | OXYGEN SATURATION: 96 %

## 2022-02-09 DIAGNOSIS — U07.1 COVID-19: Primary | ICD-10-CM

## 2022-02-09 DIAGNOSIS — N18.6 END STAGE RENAL DISEASE (HCC): ICD-10-CM

## 2022-02-09 LAB
ALBUMIN SERPL-MCNC: 2.3 G/DL (ref 3.5–5)
ALBUMIN/GLOB SERPL: 0.5 {RATIO} (ref 1.1–2.2)
ALP SERPL-CCNC: 56 U/L (ref 45–117)
ALT SERPL-CCNC: 7 U/L (ref 12–78)
ANION GAP SERPL CALC-SCNC: 16 MMOL/L (ref 5–15)
AST SERPL-CCNC: 15 U/L (ref 15–37)
BASOPHILS # BLD: 0 K/UL (ref 0–0.1)
BASOPHILS NFR BLD: 1 % (ref 0–1)
BILIRUB SERPL-MCNC: 0.4 MG/DL (ref 0.2–1)
BUN SERPL-MCNC: 33 MG/DL (ref 6–20)
BUN/CREAT SERPL: 3 (ref 12–20)
CALCIUM SERPL-MCNC: 8.5 MG/DL (ref 8.5–10.1)
CHLORIDE SERPL-SCNC: 99 MMOL/L (ref 97–108)
CO2 SERPL-SCNC: 23 MMOL/L (ref 21–32)
COVID-19 RAPID TEST, COVR: DETECTED
CREAT SERPL-MCNC: 10.43 MG/DL (ref 0.55–1.02)
DIFFERENTIAL METHOD BLD: ABNORMAL
EOSINOPHIL # BLD: 0.1 K/UL (ref 0–0.4)
EOSINOPHIL NFR BLD: 3 % (ref 0–7)
ERYTHROCYTE [DISTWIDTH] IN BLOOD BY AUTOMATED COUNT: 17.2 % (ref 11.5–14.5)
GLOBULIN SER CALC-MCNC: 4.8 G/DL (ref 2–4)
GLUCOSE SERPL-MCNC: 86 MG/DL (ref 65–100)
HCT VFR BLD AUTO: 29.5 % (ref 35–47)
HGB BLD-MCNC: 9.1 G/DL (ref 11.5–16)
IMM GRANULOCYTES # BLD AUTO: 0 K/UL
IMM GRANULOCYTES NFR BLD AUTO: 1 %
LYMPHOCYTES # BLD: 0.5 K/UL (ref 0.8–3.5)
LYMPHOCYTES NFR BLD: 11 % (ref 12–49)
MCH RBC QN AUTO: 29.4 PG (ref 26–34)
MCHC RBC AUTO-ENTMCNC: 30.8 G/DL (ref 30–36.5)
MCV RBC AUTO: 95.5 FL (ref 80–99)
MONOCYTES # BLD: 0.2 K/UL (ref 0–1)
MONOCYTES NFR BLD: 4 % (ref 5–13)
NEUTS SEG # BLD: 3.4 K/UL (ref 1.8–8)
NEUTS SEG NFR BLD: 80 % (ref 32–75)
NRBC # BLD: 0 K/UL (ref 0–0.01)
NRBC BLD-RTO: 0 PER 100 WBC
PLATELET # BLD AUTO: 144 K/UL (ref 150–400)
PMV BLD AUTO: 11.7 FL (ref 8.9–12.9)
POTASSIUM SERPL-SCNC: 3.7 MMOL/L (ref 3.5–5.1)
PROT SERPL-MCNC: 7.1 G/DL (ref 6.4–8.2)
RBC # BLD AUTO: 3.09 M/UL (ref 3.8–5.2)
RBC MORPH BLD: ABNORMAL
SODIUM SERPL-SCNC: 138 MMOL/L (ref 136–145)
SOURCE, COVRS: ABNORMAL
WBC # BLD AUTO: 4.2 K/UL (ref 3.6–11)

## 2022-02-09 PROCEDURE — 93005 ELECTROCARDIOGRAM TRACING: CPT

## 2022-02-09 PROCEDURE — 80053 COMPREHEN METABOLIC PANEL: CPT

## 2022-02-09 PROCEDURE — 87635 SARS-COV-2 COVID-19 AMP PRB: CPT

## 2022-02-09 PROCEDURE — 36415 COLL VENOUS BLD VENIPUNCTURE: CPT

## 2022-02-09 PROCEDURE — 71045 X-RAY EXAM CHEST 1 VIEW: CPT

## 2022-02-09 PROCEDURE — 85025 COMPLETE CBC W/AUTO DIFF WBC: CPT

## 2022-02-09 PROCEDURE — 74011250637 HC RX REV CODE- 250/637: Performed by: PHYSICIAN ASSISTANT

## 2022-02-09 PROCEDURE — 99285 EMERGENCY DEPT VISIT HI MDM: CPT

## 2022-02-09 RX ORDER — HYDROCODONE BITARTRATE AND ACETAMINOPHEN 5; 325 MG/1; MG/1
1 TABLET ORAL
Status: COMPLETED | OUTPATIENT
Start: 2022-02-09 | End: 2022-02-09

## 2022-02-09 RX ADMIN — HYDROCODONE BITARTRATE AND ACETAMINOPHEN 1 TABLET: 5; 325 TABLET ORAL at 17:57

## 2022-02-09 NOTE — ED PROVIDER NOTES
Date of Service:  2/9/2022    Patient:  Neal Boateng    Chief Complaint:  Generalized Body Aches       HPI:  Neal Boateng is a 29 y.o.  female who presents for evaluation of aches, runny nose, cough x2 days. States no known exposure to COVID-19. Pt states intermittent shortness of breath. Patient states that she is vaccinated for Covid receiving 2 doses of Moderna vaccine. She states history of renal failure. Patient states that she receives dialysis Monday Wednesday and Friday. Patient states that she did not receive dialysis today due to pain/body aches. Patient has AV fistula in the right arm. The history is provided by the patient. Generalized Body Aches  This is a new problem. The current episode started 2 days ago. The problem has been gradually worsening. Associated symptoms include shortness of breath. Pertinent negatives include no chest pain, no abdominal pain and no headaches. Nothing aggravates the symptoms. Nothing relieves the symptoms. Past Medical History:   Diagnosis Date    Anemia     secondary to lupus    Asthma     no inhaler use in past 2 to 3 years    Carditis     Chronic kidney disease     ESRD    Chronic pain     DDD (degenerative disc disease), lumbar     ESRD (end stage renal disease) ([address]     Fibroid tumor     GERD (gastroesophageal reflux disease)     HCAP (healthcare-associated pneumonia) 7/16/2019    Hemodialysis patient ([address] 12/21/2017    [address]  Tuesday,  Thursday,  and Saturday.      Hypercholesterolemia     Hyperkalemia 3/4/2019    Hypertension     Hypokalemia 10/27/2017    Intra-abdominal abscess ([address] 5/12/2018    Intractable nausea and vomiting 10/21/2015    Long term (current) use of anticoagulants     Lupus (systemic lupus erythematosus) (HCC)     Malignant hypertension with chronic kidney disease stage V ([address]     Peritoneal dialysis status ([address] 10/2015    x 2 years Stopped 12/2017 due to infection and removed.  Per[facility] 3/11/2018    Pneumonia 3/14/2020    Poor historian 2018    With medications    Sepsis ([facility] 2018    Thromboembolus ([facility] 2013    lungs    Transfusion history     Last Transfusion 2017  [facility]       Past Surgical History:   Procedure Laterality Date    HX  SECTION  11/2006    x1    HX OTHER SURGICAL  9/16/15    INSERTION PD CATH; Removed 2017    HX VASCULAR ACCESS Right 2017    Double-Lumen henry catheter upper chest    HX VASCULAR ACCESS Right 2018    right upper arm         Family History:   Problem Relation Age of Onset    Diabetes Father     Hypertension Father     Cancer Other         aunt with breast cancerX2    Diabetes Mother        Social History     Socioeconomic History    Marital status: SINGLE     Spouse name: Not on file    Number of children: Not on file    Years of education: Not on file    Highest education level: Not on file   Occupational History    Not on file   Tobacco Use    Smoking status: Never Smoker    Smokeless tobacco: Never Used   Substance and Sexual Activity    Alcohol use: No    Drug use: Yes     Types: Prescription, OTC    Sexual activity: Not Currently   Other Topics Concern     Service No    Blood Transfusions No    Caffeine Concern No    Occupational Exposure No    Hobby Hazards No    Sleep Concern No    Stress Concern No    Weight Concern No    Special Diet No    Back Care Yes     Comment: low back pain    Exercise No    Bike Helmet No    Seat Belt Yes    Self-Exams No   Social History Narrative    Lives with parents and daughter. Social Determinants of Health     Financial Resource Strain:     Difficulty of Paying Living Expenses: Not on file   Food Insecurity:     Worried About Running Out of Food in the Last Year: Not on file    Fede of Food in the Last Year: Not on file   Transportation Needs:     Lack of Transportation (Medical):  Not on file    Lack of Transportation (Non-Medical): Not on file   Physical Activity:     Days of Exercise per Week: Not on file    Minutes of Exercise per Session: Not on file   Stress:     Feeling of Stress : Not on file   Social Connections:     Frequency of Communication with Friends and Family: Not on file    Frequency of Social Gatherings with Friends and Family: Not on file    Attends Jew Services: Not on file    Active Member of [address] [place] [address] or Organizations: Not on file    Attends Club or Organization Meetings: Not on file    Marital Status: Not on file   Intimate Partner Violence:     Fear of Current or Ex-Partner: Not on file    Emotionally Abused: Not on file    Physically Abused: Not on file    Sexually Abused: Not on file   Housing Stability:     Unable to Pay for Housing in the Last Year: Not on file    Number of [place] in the Last Year: Not on file    Unstable Housing in the Last Year: Not on file         ALLERGIES: Compazine [prochlorperazine edisylate]    Review of Systems   Constitutional: Positive for chills and fatigue. Negative for fever. HENT: Positive for congestion and rhinorrhea. Negative for facial swelling. Eyes: Negative for visual disturbance. Respiratory: Positive for cough and shortness of breath. Cardiovascular: Negative for chest pain. Gastrointestinal: Negative for abdominal pain. Genitourinary: Negative for dysuria. Musculoskeletal: Negative for neck stiffness. Skin: Negative for rash. Allergic/Immunologic: Negative for immunocompromised state. Neurological: Negative for headaches. Psychiatric/Behavioral: Negative for agitation. All other systems reviewed and are negative. Vitals:    02/09/22 1747 02/09/22 1800 02/09/22 1817 02/09/22 1930   BP: (!) 142/102 (!) 145/113 (!) 148/115 (!) 140/95   Pulse:       Resp:       Temp:       SpO2: 93% 95% 94% 92%   Weight:       Height:                Physical Exam  Vitals and nursing note reviewed. Constitutional:       General: She is not in acute distress. HENT:      Head: Atraumatic. Eyes:      Conjunctiva/sclera: Conjunctivae normal.   Cardiovascular:      Rate and Rhythm: Normal rate and regular rhythm. Heart sounds: No murmur heard. Pulmonary:      Effort: Pulmonary effort is normal.      Breath sounds: Normal breath sounds. Abdominal:      Palpations: Abdomen is soft. Tenderness: There is no abdominal tenderness. Musculoskeletal:         General: Normal range of motion. Cervical back: Normal range of motion. Skin:     General: Skin is warm. Neurological:      Mental Status: She is alert and oriented to person, place, and time. Mental status is at baseline. MDM       Procedures      VITAL SIGNS:  Patient Vitals for the past 4 hrs:   Temp Pulse Resp BP SpO2   02/09/22 1930 -- -- -- (!) 140/95 92 %   02/09/22 1817 -- -- -- (!) 148/115 94 %   02/09/22 1800 -- -- -- (!) 145/113 95 %   02/09/22 1747 -- -- -- (!) 142/102 93 %   02/09/22 1732 -- -- -- (!) 164/114 93 %   02/09/22 1717 -- -- -- (!) 152/106 91 %   02/09/22 1702 -- -- -- (!) 166/114 94 %   02/09/22 1647 -- -- -- (!) 159/118 94 %   02/09/22 1635 -- -- -- -- 94 %   02/09/22 1632 98.9 °F (37.2 °C) (!) 110 18 (!) 148/110 94 %         LABS:  Recent Results (from the past 6 hour(s))   CBC WITH AUTOMATED DIFF    Collection Time: 02/09/22  4:59 PM   Result Value Ref Range    WBC 4.2 3.6 - 11.0 K/uL    RBC 3.09 (L) 3.80 - 5.20 M/uL    HGB 9.1 (L) 11.5 - 16.0 g/dL    HCT 29.5 (L) 35.0 - 47.0 %    MCV 95.5 80.0 - 99.0 FL    MCH 29.4 26.0 - 34.0 PG    MCHC 30.8 30.0 - 36.5 g/dL    RDW 17.2 (H) 11.5 - 14.5 %    PLATELET 614 (L) 841 - 400 K/uL    MPV 11.7 8.9 - 12.9 FL    NRBC 0.0 0  WBC    ABSOLUTE NRBC 0.00 0.00 - 0.01 K/uL    NEUTROPHILS 80 (H) 32 - 75 %    LYMPHOCYTES 11 (L) 12 - 49 %    MONOCYTES 4 (L) 5 - 13 %    EOSINOPHILS 3 0 - 7 %    BASOPHILS 1 0 - 1 %    IMMATURE GRANULOCYTES 1 %    ABS.  NEUTROPHILS 3. 4 1.8 - 8.0 K/UL    ABS. LYMPHOCYTES 0.5 (L) 0.8 - 3.5 K/UL    ABS. MONOCYTES 0.2 0.0 - 1.0 K/UL    ABS. EOSINOPHILS 0.1 0.0 - 0.4 K/UL    ABS. BASOPHILS 0.0 0.0 - 0.1 K/UL    ABS. IMM. GRANS. 0.0 K/UL    DF MANUAL      RBC COMMENTS ANISOCYTOSIS  1+       METABOLIC PANEL, COMPREHENSIVE    Collection Time: 02/09/22  4:59 PM   Result Value Ref Range    Sodium 138 136 - 145 mmol/L    Potassium 3.7 3.5 - 5.1 mmol/L    Chloride 99 97 - 108 mmol/L    CO2 23 21 - 32 mmol/L    Anion gap 16 (H) 5 - 15 mmol/L    Glucose 86 65 - 100 mg/dL    BUN 33 (H) 6 - 20 MG/DL    Creatinine 10.43 (H) 0.55 - 1.02 MG/DL    BUN/Creatinine ratio 3 (L) 12 - 20      GFR est AA 5 (L) >60 ml/min/1.73m2    GFR est non-AA 4 (L) >60 ml/min/1.73m2    Calcium 8.5 8.5 - 10.1 MG/DL    Bilirubin, total 0.4 0.2 - 1.0 MG/DL    ALT (SGPT) 7 (L) 12 - 78 U/L    AST (SGOT) 15 15 - 37 U/L    Alk. phosphatase 56 45 - 117 U/L    Protein, total 7.1 6.4 - 8.2 g/dL    Albumin 2.3 (L) 3.5 - 5.0 g/dL    Globulin 4.8 (H) 2.0 - 4.0 g/dL    A-G Ratio 0.5 (L) 1.1 - 2.2     COVID-19 RAPID TEST    Collection Time: 02/09/22  5:58 PM   Result Value Ref Range    Specimen source Nasopharyngeal      COVID-19 rapid test Detected (A) NOTD          IMAGING:  XR CHEST PORT   Final Result   No acute cardiopulmonary disease. Medications During Visit:  Medications   HYDROcodone-acetaminophen (NORCO) 5-325 mg per tablet 1 Tablet (1 Tablet Oral Given 2/9/22 6759)         DECISION MAKING:  Jayleen Molina is a 29 y.o. female who comes in as above. Patient tested positive for COVID-19. CXR was normal.  Patient is dialysis. CMP showed abnormal electrolytes. Consult with nephrology. Case was discussed with Dr. Giulia Bond. Nephrology to call patient tomorrow to set up dialysis appointment for Friday      IMPRESSION:  1. COVID-19    2.  End stage renal disease ([facility]        DISPOSITION:        Current Discharge Medication List           Follow-up Information     Follow up With Specialties Details Why Emilee Sellers MD Nephrology Schedule an appointment as soon as possible for a visit  If symptoms worsen [place] [phone]      [address] [facility] Emergency Medicine  If symptoms worsen [address] [place] [address] [phone]            The patient is asked to follow-up nephrologist  in the next several days. They are to call tomorrow for an appointment. The patient is asked to return promptly for any increased concerns or worsening of symptoms. They can return to this emergency department or any other emergency department.

## 2022-02-09 NOTE — ED TRIAGE NOTES
Pt report body aches, coughing, runny nose that started yesterday. No fever. Patient has taken percocet for body aches.

## 2022-02-10 DIAGNOSIS — R10.2 PELVIC PAIN: ICD-10-CM

## 2022-02-10 DIAGNOSIS — Z99.2 ESRD ON HEMODIALYSIS (HCC): ICD-10-CM

## 2022-02-10 DIAGNOSIS — N18.6 ESRD ON HEMODIALYSIS (HCC): ICD-10-CM

## 2022-02-10 LAB
ATRIAL RATE: 103 BPM
CALCULATED P AXIS, ECG09: 75 DEGREES
CALCULATED R AXIS, ECG10: 94 DEGREES
CALCULATED T AXIS, ECG11: 46 DEGREES
DIAGNOSIS, 93000: NORMAL
P-R INTERVAL, ECG05: 168 MS
Q-T INTERVAL, ECG07: 348 MS
QRS DURATION, ECG06: 80 MS
QTC CALCULATION (BEZET), ECG08: 455 MS
VENTRICULAR RATE, ECG03: 103 BPM

## 2022-02-10 RX ORDER — OXYCODONE AND ACETAMINOPHEN 5; 325 MG/1; MG/1
1 TABLET ORAL
Qty: 20 TABLET | Refills: 0 | Status: SHIPPED | OUTPATIENT
Start: 2022-02-10 | End: 2022-02-22 | Stop reason: SDUPTHER

## 2022-02-10 NOTE — ED NOTES
Pain assessment on discharge was   Condition Stable  Patient discharged to home  Patient education was completed  Education taught to patient  Teaching method used was handout and verbal  Understanding of teaching was good  Patient was discharged ambulatory  Discharged with self  Valuables were given to:

## 2022-02-10 NOTE — TELEPHONE ENCOUNTER
Pt calling in for a refill    Requested Prescriptions     Pending Prescriptions Disp Refills    oxyCODONE-acetaminophen (PERCOCET) 5-325 mg per tablet 20 Tablet 0     Sig: Take 1 Tablet by mouth every four (4) hours as needed for Pain for up to 14 days. Max Daily Amount: 6 Tablets.

## 2022-02-21 ENCOUNTER — TELEPHONE (OUTPATIENT)
Dept: GYNECOLOGY | Age: 37
End: 2022-02-21

## 2022-02-21 DIAGNOSIS — N18.6 ESRD ON HEMODIALYSIS (HCC): ICD-10-CM

## 2022-02-21 DIAGNOSIS — Z99.2 ESRD ON HEMODIALYSIS (HCC): ICD-10-CM

## 2022-02-21 DIAGNOSIS — R10.2 PELVIC PAIN: ICD-10-CM

## 2022-02-21 NOTE — TELEPHONE ENCOUNTER
Patient wants you to call her a refill in for her oxycodone to [facility] off [address].

## 2022-02-22 RX ORDER — OXYCODONE AND ACETAMINOPHEN 5; 325 MG/1; MG/1
1 TABLET ORAL
Qty: 20 TABLET | Refills: 0 | Status: SHIPPED | OUTPATIENT
Start: 2022-02-22 | End: 2022-03-03 | Stop reason: SDUPTHER

## 2022-02-23 ENCOUNTER — HOSPITAL ENCOUNTER (EMERGENCY)
Age: 37
Discharge: HOME | End: 2022-02-23
Attending: EMERGENCY MEDICINE | Admitting: EMERGENCY MEDICINE
Payer: MEDICARE

## 2022-02-23 VITALS
SYSTOLIC BLOOD PRESSURE: 153 MMHG | BODY MASS INDEX: 36.4 KG/M2 | RESPIRATION RATE: 18 BRPM | TEMPERATURE: 99.9 F | WEIGHT: 218.48 LBS | HEART RATE: 108 BPM | DIASTOLIC BLOOD PRESSURE: 109 MMHG | OXYGEN SATURATION: 91 % | HEIGHT: 65 IN

## 2022-02-23 DIAGNOSIS — N75.1 BARTHOLIN'S GLAND ABSCESS: Primary | ICD-10-CM

## 2022-02-23 PROCEDURE — 74011000250 HC RX REV CODE- 250: Performed by: EMERGENCY MEDICINE

## 2022-02-23 PROCEDURE — 99284 EMERGENCY DEPT VISIT MOD MDM: CPT

## 2022-02-23 PROCEDURE — 74011250637 HC RX REV CODE- 250/637: Performed by: EMERGENCY MEDICINE

## 2022-02-23 PROCEDURE — 75810000113 HC INC/DRN BARHOLIN GLND ABCESS

## 2022-02-23 RX ORDER — LIDOCAINE 40 MG/G
CREAM TOPICAL
Status: COMPLETED | OUTPATIENT
Start: 2022-02-23 | End: 2022-02-23

## 2022-02-23 RX ORDER — LIDOCAINE HYDROCHLORIDE AND EPINEPHRINE 10; 10 MG/ML; UG/ML
1.5 INJECTION, SOLUTION INFILTRATION; PERINEURAL ONCE
Status: COMPLETED | OUTPATIENT
Start: 2022-02-23 | End: 2022-02-23

## 2022-02-23 RX ORDER — IBUPROFEN 400 MG/1
800 TABLET ORAL ONCE
Status: COMPLETED | OUTPATIENT
Start: 2022-02-23 | End: 2022-02-23

## 2022-02-23 RX ORDER — HYDROCODONE BITARTRATE AND ACETAMINOPHEN 5; 325 MG/1; MG/1
1 TABLET ORAL
Qty: 8 TABLET | Refills: 0 | Status: SHIPPED | OUTPATIENT
Start: 2022-02-23 | End: 2022-02-26

## 2022-02-23 RX ORDER — ONDANSETRON 4 MG/1
4 TABLET, ORALLY DISINTEGRATING ORAL
Status: COMPLETED | OUTPATIENT
Start: 2022-02-23 | End: 2022-02-23

## 2022-02-23 RX ORDER — DOXYCYCLINE HYCLATE 100 MG
100 TABLET ORAL
Status: COMPLETED | OUTPATIENT
Start: 2022-02-23 | End: 2022-02-23

## 2022-02-23 RX ORDER — DOXYCYCLINE HYCLATE 100 MG
100 TABLET ORAL 2 TIMES DAILY
Qty: 14 TABLET | Refills: 0 | Status: SHIPPED | OUTPATIENT
Start: 2022-02-23 | End: 2022-03-02

## 2022-02-23 RX ORDER — HYDROCODONE BITARTRATE AND ACETAMINOPHEN 5; 325 MG/1; MG/1
1 TABLET ORAL ONCE
Status: COMPLETED | OUTPATIENT
Start: 2022-02-23 | End: 2022-02-23

## 2022-02-23 RX ADMIN — IBUPROFEN 800 MG: 400 TABLET, FILM COATED ORAL at 20:16

## 2022-02-23 RX ADMIN — LIDOCAINE HYDROCHLORIDE AND EPINEPHRINE 15 MG: 10; 10 INJECTION, SOLUTION INFILTRATION; PERINEURAL at 21:51

## 2022-02-23 RX ADMIN — LIDOCAINE 4%: 4 CREAM TOPICAL at 20:16

## 2022-02-23 RX ADMIN — DOXYCYCLINE HYCLATE 100 MG: 100 TABLET, COATED ORAL at 20:55

## 2022-02-23 RX ADMIN — HYDROCODONE BITARTRATE AND ACETAMINOPHEN 1 TABLET: 5; 325 TABLET ORAL at 20:16

## 2022-02-23 RX ADMIN — ONDANSETRON 4 MG: 4 TABLET, ORALLY DISINTEGRATING ORAL at 21:51

## 2022-02-24 NOTE — ED PROVIDER NOTES
60-year-old female with past medical history significant for end-stage renal disease with dialysis , no missed dialysis, hypertension, lupus, asthma presents with complaints of 3 days increasing right-sided vulvar swelling with pain. Patient denies fever. Denies any recent sexual activity. Denies vaginal discharge, pregnancy, vaginal bleeding. Patient reports she has had a Bartholin's abscess in the past requiring drainage. Denies tobacco use, drug use, alcohol use  Gynecology-[facility]  Primary care-[facility]  Nephrology-[facility]           Past Medical History:   Diagnosis Date    Anemia     secondary to lupus    Asthma     no inhaler use in past 2 to 3 years    Carditis     Chronic kidney disease     ESRD    Chronic pain     DDD (degenerative disc disease), lumbar     ESRD (end stage renal disease) ([address]     Fibroid tumor     GERD (gastroesophageal reflux disease)     HCAP (healthcare-associated pneumonia) 2019    Hemodialysis patient ([address] 2017    [address]  Tuesday,  Thursday,  and Saturday. 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  Peritonitis ([address] 3/11/2018    Pneumonia 3/14/2020    Poor historian 2018    With medications    Sepsis ([address] 2018    Thromboembolus ([address] 2013    lungs    Transfusion history     [facility] Transfusion 2017  at [facility]       Past Surgical History:   Procedure Laterality Date    HX  SECTION  11/2006    x1    HX OTHER SURGICAL  9/16/15    INSERTION PD CATH;  Removed 2017    HX VASCULAR ACCESS Right 12/2017    Double-Lumen henry catheter upper chest    HX VASCULAR ACCESS Right 2018    right upper arm         Family History:   Problem Relation Age of Onset    Diabetes Father     Hypertension Father     Cancer Other         aunt with breast cancerX2    Diabetes Mother        Social History     Socioeconomic History    Marital status: SINGLE     Spouse name: Not on file    Number of children: Not on file    Years of education: Not on file    Highest education level: Not on file   Occupational History    Not on file   Tobacco Use    Smoking status: Never Smoker    Smokeless tobacco: Never Used   Substance and Sexual Activity    Alcohol use: No    Drug use: Yes     Types: Prescription, OTC    Sexual activity: Not Currently   Other Topics Concern     Service No    Blood Transfusions No    Caffeine Concern No    Occupational Exposure No    Hobby Hazards No    Sleep Concern No    Stress Concern No    Weight Concern No    Special Diet No    Back Care Yes     Comment: low back pain    Exercise No    Bike Helmet No    Seat Belt Yes    Self-Exams No   Social History Narrative    Lives with parents and daughter. Social Determinants of Health     Financial Resource Strain:     Difficulty of Paying Living Expenses: Not on file   Food Insecurity:     Worried About Running Out of Food in the Last Year: Not on file    Fede of Food in the Last Year: Not on file   Transportation Needs:     Lack of Transportation (Medical): Not on file    Lack of Transportation (Non-Medical):  Not on file   Physical Activity:     Days of Exercise per Week: Not on file    Minutes of Exercise per Session: Not on file   Stress:     Feeling of Stress : Not on file   Social Connections:     Frequency of Communication with Friends and Family: Not on file    Frequency of Social Gatherings with Friends and Family: Not on file    Attends Taoist Services: Not on file   [employer] of Clubs or Organizations: Not on file    Attends Club or Organization Meetings: Not on file    Marital Status: Not on file   Intimate Partner Violence:     Fear of Current or Ex-Partner: Not on file    Emotionally Abused: Not on file    Physically Abused: Not on file    Sexually Abused: Not on file   Housing Stability:     Unable to Pay for Housing in the Last Year: Not on file    Number of [place] in the Last Year: Not on file    Unstable Housing in the Last Year: Not on file         ALLERGIES: Compazine [prochlorperazine edisylate]    Review of Systems   Constitutional: Negative for chills and fever. HENT: Negative for congestion, nosebleeds and rhinorrhea. Eyes: Negative for pain and redness. Respiratory: Negative for cough and shortness of breath. Cardiovascular: Negative for chest pain and palpitations. Gastrointestinal: Negative for abdominal pain, nausea and vomiting. Genitourinary: Positive for genital sores and vaginal pain. Negative for dysuria, frequency and vaginal bleeding. Musculoskeletal: Negative for myalgias. Skin: Negative for rash and wound. Neurological: Negative for seizures, syncope and weakness. Hematological: Does not bruise/bleed easily. Psychiatric/Behavioral: Negative for agitation, confusion, dysphoric mood and suicidal ideas. The patient is not nervous/anxious. Vitals:    02/23/22 1957   BP: (!) 184/120   Pulse: (!) 108   Resp: 18   Temp: 99.9 °F (37.7 °C)   SpO2: 99%   Weight: 99.1 kg (218 lb 7.6 oz)   Height: 5' 5\" (1.651 m)            Physical Exam  Vitals and nursing note reviewed. Constitutional:       Appearance: She is well-developed. HENT:      Head: Normocephalic and atraumatic. Eyes:      Pupils: Pupils are equal, round, and reactive to light. Neck:      Trachea: No tracheal deviation. Cardiovascular:      Rate and Rhythm: Normal rate and regular rhythm. Heart sounds: Normal heart sounds.    Pulmonary:      Effort: Pulmonary effort is normal. No respiratory distress. Breath sounds: Normal breath sounds. No stridor. No wheezing or rales. Chest:      Chest wall: No tenderness. Abdominal:      General: Bowel sounds are normal. There is no distension. Palpations: Abdomen is soft. Tenderness: There is no abdominal tenderness. There is no rebound. Genitourinary:     Labia:         Right: Tenderness present. Musculoskeletal:         General: No tenderness. Normal range of motion. Cervical back: Normal range of motion and neck supple. Skin:     General: Skin is warm and dry. Coloration: Skin is not pale. Findings: No rash. Neurological:      Mental Status: She is alert and oriented to person, place, and time. Cranial Nerves: No cranial nerve deficit. MDM  Number of Diagnoses or Management Options  Bartholin's gland abscess  Diagnosis management comments: 78-year-old female with lupus, end-stage renal disease, asthma presents with large Bartholin cyst.  Patient is afebrile, nontoxic, hemodynamically stable, well-appearing, golfball sized right-sided swelling vaginal opening. Plan-pain control, antibiotics, drain abscess, follow-up with gynecology. Patient Progress  Patient progress: improved         I&D [facility]    Date/Time: 2/23/2022 9:48 PM  Performed by: Nancy Garcia MD  Authorized by: Nancy Garcia MD     Consent:     Consent obtained:  Verbal    Consent given by:  Patient    Risks discussed:  Incomplete drainage, infection and pain  Location:     Type:  Bartholin cyst    Size:  6    Location:  Anogenital    Anogenital location:  Bartholin's gland  Anesthesia (see MAR for exact dosages):      Anesthesia method:  Topical application and local infiltration    Topical anesthetic:  Lidocaine gel    Local anesthetic:  Lidocaine 1% WITH epi  Procedure type:     Complexity:  Complex  Procedure details:     Incision types:  Single straight    Scalpel blade:  11 Wound management:  Probed and deloculated    Drainage:  Purulent    Drainage amount:  Copious    Wound treatment:  Drain placed  Post-procedure details:     Patient tolerance of procedure: Tolerated well, no immediate complications        7:74 PM  Patient's results have been reviewed with them. Patient and/or family have verbally conveyed their understanding and agreement of the patient's signs, symptoms, diagnosis, treatment and prognosis and additionally agree to follow up as recommended or return to the Emergency Room should their condition change prior to follow-up. Discharge instructions have also been provided to the patient with some educational information regarding their diagnosis as well a list of reasons why they would want to return to the ER prior to their follow-up appointment should their condition change.

## 2022-03-03 ENCOUNTER — TELEPHONE (OUTPATIENT)
Dept: GYNECOLOGY | Age: 37
End: 2022-03-03

## 2022-03-03 ENCOUNTER — TELEPHONE (OUTPATIENT)
Dept: NEUROLOGY | Age: 37
End: 2022-03-03

## 2022-03-03 DIAGNOSIS — Z99.2 ESRD ON HEMODIALYSIS (HCC): ICD-10-CM

## 2022-03-03 DIAGNOSIS — N18.6 ESRD ON HEMODIALYSIS (HCC): ICD-10-CM

## 2022-03-03 DIAGNOSIS — R10.2 PELVIC PAIN: ICD-10-CM

## 2022-03-03 RX ORDER — OXYCODONE AND ACETAMINOPHEN 5; 325 MG/1; MG/1
1 TABLET ORAL
Qty: 20 TABLET | Refills: 0 | Status: SHIPPED | OUTPATIENT
Start: 2022-03-03 | End: 2022-03-15 | Stop reason: SDUPTHER

## 2022-03-03 NOTE — TELEPHONE ENCOUNTER
Patient would like a call back to discuss why a prescription refill was denied by Dr. Emma Brown.  Best call back number: [phone]

## 2022-03-03 NOTE — TELEPHONE ENCOUNTER
Returned her call. She stated her refill of the 150 mg of the amitriptyline was removed. That was done in error. Spoke to pharmacist and reinstated the refill of the amitriptyline Rx for 150 mg. They will let the patient know that it is ready for .

## 2022-03-09 NOTE — HOME CARE
Patient open to [facility] for SN with certification period through 6/17/18. If patient discharges by 6/20 with Resumption of Home Health orders we can use the original referral and recertify her home visits. If patient discharges 6/21 or later we will need a new referral and orders if home health is still needed.  Lakshmi Chahal RN [employer] [employer]

## 2022-03-15 ENCOUNTER — TELEPHONE (OUTPATIENT)
Dept: GYNECOLOGY | Age: 37
End: 2022-03-15

## 2022-03-15 DIAGNOSIS — N18.6 ESRD ON HEMODIALYSIS (HCC): ICD-10-CM

## 2022-03-15 DIAGNOSIS — Z99.2 ESRD ON HEMODIALYSIS (HCC): ICD-10-CM

## 2022-03-15 DIAGNOSIS — R10.2 PELVIC PAIN: ICD-10-CM

## 2022-03-15 NOTE — TELEPHONE ENCOUNTER
Patient would like you to call in her pain meds to [facility] on [facility] [address].   Also give her a phone call please

## 2022-03-15 NOTE — TELEPHONE ENCOUNTER
Pt calling for refill on her percocet, still waiting on kidney, had two recent phone calls but unfortunately was not able to receive either kidney    Requested Prescriptions     Pending Prescriptions Disp Refills    oxyCODONE-acetaminophen (PERCOCET) 5-325 mg per tablet 20 Tablet 0     Sig: Take 1 Tablet by mouth every eight (8) hours as needed for Pain for up to 14 days. Max Daily Amount: 3 Tablets.

## 2022-03-16 RX ORDER — OXYCODONE AND ACETAMINOPHEN 5; 325 MG/1; MG/1
1 TABLET ORAL
Qty: 20 TABLET | Refills: 0 | Status: SHIPPED | OUTPATIENT
Start: 2022-03-16 | End: 2022-03-28 | Stop reason: SDUPTHER

## 2022-03-18 PROBLEM — F11.20 OPIOID DEPENDENCE, UNCOMPLICATED (HCC): Status: ACTIVE | Noted: 2021-08-10

## 2022-03-18 PROBLEM — N18.9 ANEMIA OF RENAL DISEASE: Status: ACTIVE | Noted: 2017-02-21

## 2022-03-18 PROBLEM — D63.1 ANEMIA OF RENAL DISEASE: Status: ACTIVE | Noted: 2017-02-21

## 2022-03-18 PROBLEM — N94.6 DYSMENORRHEA: Status: ACTIVE | Noted: 2021-05-20

## 2022-03-19 ENCOUNTER — APPOINTMENT (OUTPATIENT)
Dept: GENERAL RADIOLOGY | Age: 37
End: 2022-03-19
Attending: EMERGENCY MEDICINE
Payer: MEDICARE

## 2022-03-19 ENCOUNTER — HOSPITAL ENCOUNTER (OUTPATIENT)
Age: 37
Setting detail: OBSERVATION
Discharge: HOME | End: 2022-03-20
Attending: EMERGENCY MEDICINE | Admitting: STUDENT IN AN ORGANIZED HEALTH CARE EDUCATION/TRAINING PROGRAM
Payer: MEDICARE

## 2022-03-19 DIAGNOSIS — Z99.2 ESRD (END STAGE RENAL DISEASE) ON DIALYSIS (HCC): ICD-10-CM

## 2022-03-19 DIAGNOSIS — J81.0 ACUTE PULMONARY EDEMA (HCC): ICD-10-CM

## 2022-03-19 DIAGNOSIS — N18.6 ESRD (END STAGE RENAL DISEASE) ON DIALYSIS (HCC): ICD-10-CM

## 2022-03-19 DIAGNOSIS — R50.9 FEVER, UNSPECIFIED FEVER CAUSE: Primary | ICD-10-CM

## 2022-03-19 PROBLEM — R56.9 SEIZURE (HCC): Status: ACTIVE | Noted: 2019-10-18

## 2022-03-19 PROBLEM — Z86.711 HISTORY OF PULMONARY EMBOLISM: Status: ACTIVE | Noted: 2017-12-08

## 2022-03-19 PROBLEM — Z71.89 ACP (ADVANCE CARE PLANNING): Status: ACTIVE | Noted: 2017-02-21

## 2022-03-19 PROBLEM — K59.09 OTHER CONSTIPATION: Status: ACTIVE | Noted: 2018-04-04

## 2022-03-19 PROBLEM — F11.29 OPIOID DEPENDENCE WITH UNSPECIFIED OPIOID-INDUCED DISORDER (HCC): Status: ACTIVE | Noted: 2021-08-10

## 2022-03-19 PROBLEM — K66.0 INTRA-ABDOMINAL ADHESIONS: Status: ACTIVE | Noted: 2018-05-12

## 2022-03-19 PROBLEM — R10.2 PELVIC PAIN: Status: ACTIVE | Noted: 2021-05-27

## 2022-03-19 PROBLEM — R19.00 PELVIC MASS: Status: ACTIVE | Noted: 2021-03-16

## 2022-03-19 LAB
ALBUMIN SERPL-MCNC: 2.2 G/DL (ref 3.5–5)
ALBUMIN/GLOB SERPL: 0.4 {RATIO} (ref 1.1–2.2)
ALP SERPL-CCNC: 103 U/L (ref 45–117)
ALT SERPL-CCNC: 34 U/L (ref 12–78)
ANION GAP SERPL CALC-SCNC: 11 MMOL/L (ref 5–15)
AST SERPL-CCNC: 101 U/L (ref 15–37)
BASOPHILS # BLD: 0 K/UL (ref 0–0.1)
BASOPHILS NFR BLD: 0 % (ref 0–1)
BILIRUB SERPL-MCNC: 1 MG/DL (ref 0.2–1)
BUN SERPL-MCNC: 18 MG/DL (ref 6–20)
BUN/CREAT SERPL: 3 (ref 12–20)
CALCIUM SERPL-MCNC: 8.4 MG/DL (ref 8.5–10.1)
CHLORIDE SERPL-SCNC: 99 MMOL/L (ref 97–108)
CO2 SERPL-SCNC: 30 MMOL/L (ref 21–32)
COVID-19 RAPID TEST, COVR: NOT DETECTED
CREAT SERPL-MCNC: 6.74 MG/DL (ref 0.55–1.02)
DIFFERENTIAL METHOD BLD: ABNORMAL
EOSINOPHIL # BLD: 0 K/UL (ref 0–0.4)
EOSINOPHIL NFR BLD: 0 % (ref 0–7)
ERYTHROCYTE [DISTWIDTH] IN BLOOD BY AUTOMATED COUNT: 19 % (ref 11.5–14.5)
GLOBULIN SER CALC-MCNC: 5.7 G/DL (ref 2–4)
GLUCOSE SERPL-MCNC: 90 MG/DL (ref 65–100)
HCT VFR BLD AUTO: 31.1 % (ref 35–47)
HGB BLD-MCNC: 10 G/DL (ref 11.5–16)
IMM GRANULOCYTES # BLD AUTO: 0.1 K/UL (ref 0–0.04)
IMM GRANULOCYTES NFR BLD AUTO: 1 % (ref 0–0.5)
LACTATE SERPL-SCNC: 2 MMOL/L (ref 0.4–2)
LYMPHOCYTES # BLD: 0.5 K/UL (ref 0.8–3.5)
LYMPHOCYTES NFR BLD: 7 % (ref 12–49)
MCH RBC QN AUTO: 28.9 PG (ref 26–34)
MCHC RBC AUTO-ENTMCNC: 32.2 G/DL (ref 30–36.5)
MCV RBC AUTO: 89.9 FL (ref 80–99)
MONOCYTES # BLD: 0.2 K/UL (ref 0–1)
MONOCYTES NFR BLD: 3 % (ref 5–13)
NEUTS SEG # BLD: 6.5 K/UL (ref 1.8–8)
NEUTS SEG NFR BLD: 89 % (ref 32–75)
NRBC # BLD: 0 K/UL (ref 0–0.01)
NRBC BLD-RTO: 0 PER 100 WBC
PLATELET # BLD AUTO: 184 K/UL (ref 150–400)
PMV BLD AUTO: 11.7 FL (ref 8.9–12.9)
POTASSIUM SERPL-SCNC: 3.7 MMOL/L (ref 3.5–5.1)
PROT SERPL-MCNC: 7.9 G/DL (ref 6.4–8.2)
RBC # BLD AUTO: 3.46 M/UL (ref 3.8–5.2)
RBC MORPH BLD: ABNORMAL
SODIUM SERPL-SCNC: 140 MMOL/L (ref 136–145)
SOURCE, COVRS: NORMAL
WBC # BLD AUTO: 7.3 K/UL (ref 3.6–11)

## 2022-03-19 PROCEDURE — 87040 BLOOD CULTURE FOR BACTERIA: CPT

## 2022-03-19 PROCEDURE — 65390000012 HC CONDITION CODE 44 OBSERVATION

## 2022-03-19 PROCEDURE — 93005 ELECTROCARDIOGRAM TRACING: CPT

## 2022-03-19 PROCEDURE — 80053 COMPREHEN METABOLIC PANEL: CPT

## 2022-03-19 PROCEDURE — 85025 COMPLETE CBC W/AUTO DIFF WBC: CPT

## 2022-03-19 PROCEDURE — 99285 EMERGENCY DEPT VISIT HI MDM: CPT

## 2022-03-19 PROCEDURE — 83605 ASSAY OF LACTIC ACID: CPT

## 2022-03-19 PROCEDURE — 87635 SARS-COV-2 COVID-19 AMP PRB: CPT

## 2022-03-19 PROCEDURE — 74011250637 HC RX REV CODE- 250/637: Performed by: EMERGENCY MEDICINE

## 2022-03-19 PROCEDURE — 71045 X-RAY EXAM CHEST 1 VIEW: CPT

## 2022-03-19 PROCEDURE — 36415 COLL VENOUS BLD VENIPUNCTURE: CPT

## 2022-03-19 RX ORDER — ACETAMINOPHEN 500 MG
1000 TABLET ORAL ONCE
Status: COMPLETED | OUTPATIENT
Start: 2022-03-19 | End: 2022-03-19

## 2022-03-19 RX ORDER — SODIUM CHLORIDE 0.9 % (FLUSH) 0.9 %
5-10 SYRINGE (ML) INJECTION AS NEEDED
Status: DISCONTINUED | OUTPATIENT
Start: 2022-03-19 | End: 2022-03-20 | Stop reason: HOSPADM

## 2022-03-19 RX ADMIN — ACETAMINOPHEN 1000 MG: 500 TABLET ORAL at 22:00

## 2022-03-20 VITALS
WEIGHT: 219.14 LBS | TEMPERATURE: 97.9 F | OXYGEN SATURATION: 95 % | RESPIRATION RATE: 20 BRPM | HEIGHT: 65 IN | HEART RATE: 96 BPM | SYSTOLIC BLOOD PRESSURE: 144 MMHG | BODY MASS INDEX: 36.51 KG/M2 | DIASTOLIC BLOOD PRESSURE: 100 MMHG

## 2022-03-20 PROBLEM — J96.90 RESPIRATORY FAILURE (HCC): Status: ACTIVE | Noted: 2022-03-20

## 2022-03-20 PROBLEM — F11.99 OPIOID USE, UNSPECIFIED WITH UNSPECIFIED OPIOID-INDUCED DISORDER (HCC): Status: ACTIVE | Noted: 2021-08-10

## 2022-03-20 PROBLEM — E66.01 SEVERE OBESITY (HCC): Status: ACTIVE | Noted: 2020-11-19

## 2022-03-20 PROBLEM — J96.90 RESPIRATORY FAILURE (HCC): Status: RESOLVED | Noted: 2022-03-20 | Resolved: 2022-03-20

## 2022-03-20 LAB
ATRIAL RATE: 106 BPM
CALCULATED P AXIS, ECG09: 53 DEGREES
CALCULATED R AXIS, ECG10: 66 DEGREES
CALCULATED T AXIS, ECG11: -3 DEGREES
DIAGNOSIS, 93000: NORMAL
FLUAV AG NPH QL IA: NEGATIVE
FLUBV AG NOSE QL IA: NEGATIVE
P-R INTERVAL, ECG05: 166 MS
Q-T INTERVAL, ECG07: 352 MS
QRS DURATION, ECG06: 90 MS
QTC CALCULATION (BEZET), ECG08: 467 MS
VENTRICULAR RATE, ECG03: 106 BPM

## 2022-03-20 PROCEDURE — 96374 THER/PROPH/DIAG INJ IV PUSH: CPT

## 2022-03-20 PROCEDURE — 74011000250 HC RX REV CODE- 250: Performed by: STUDENT IN AN ORGANIZED HEALTH CARE EDUCATION/TRAINING PROGRAM

## 2022-03-20 PROCEDURE — 65390000012 HC CONDITION CODE 44 OBSERVATION

## 2022-03-20 PROCEDURE — 74011250637 HC RX REV CODE- 250/637: Performed by: STUDENT IN AN ORGANIZED HEALTH CARE EDUCATION/TRAINING PROGRAM

## 2022-03-20 PROCEDURE — 87804 INFLUENZA ASSAY W/OPTIC: CPT

## 2022-03-20 PROCEDURE — G0378 HOSPITAL OBSERVATION PER HR: HCPCS

## 2022-03-20 PROCEDURE — 96372 THER/PROPH/DIAG INJ SC/IM: CPT

## 2022-03-20 PROCEDURE — 74011250636 HC RX REV CODE- 250/636: Performed by: STUDENT IN AN ORGANIZED HEALTH CARE EDUCATION/TRAINING PROGRAM

## 2022-03-20 RX ORDER — SODIUM CHLORIDE 0.9 % (FLUSH) 0.9 %
5-40 SYRINGE (ML) INJECTION EVERY 8 HOURS
Status: DISCONTINUED | OUTPATIENT
Start: 2022-03-20 | End: 2022-03-20 | Stop reason: HOSPADM

## 2022-03-20 RX ORDER — AMITRIPTYLINE HYDROCHLORIDE 150 MG/1
150 TABLET, FILM COATED ORAL
COMMUNITY
End: 2022-04-28 | Stop reason: SDUPTHER

## 2022-03-20 RX ORDER — HYDRALAZINE HYDROCHLORIDE 20 MG/ML
10 INJECTION INTRAMUSCULAR; INTRAVENOUS
Status: DISCONTINUED | OUTPATIENT
Start: 2022-03-20 | End: 2022-03-20 | Stop reason: HOSPADM

## 2022-03-20 RX ORDER — AMLODIPINE BESYLATE 5 MG/1
10 TABLET ORAL DAILY
Status: DISCONTINUED | OUTPATIENT
Start: 2022-03-20 | End: 2022-03-20 | Stop reason: HOSPADM

## 2022-03-20 RX ORDER — HEPARIN SODIUM 5000 [USP'U]/ML
5000 INJECTION, SOLUTION INTRAVENOUS; SUBCUTANEOUS EVERY 8 HOURS
Status: DISCONTINUED | OUTPATIENT
Start: 2022-03-20 | End: 2022-03-20 | Stop reason: HOSPADM

## 2022-03-20 RX ORDER — OXYCODONE AND ACETAMINOPHEN 5; 325 MG/1; MG/1
1 TABLET ORAL
Status: DISCONTINUED | OUTPATIENT
Start: 2022-03-20 | End: 2022-03-20 | Stop reason: HOSPADM

## 2022-03-20 RX ORDER — SODIUM CHLORIDE 0.9 % (FLUSH) 0.9 %
5-40 SYRINGE (ML) INJECTION AS NEEDED
Status: DISCONTINUED | OUTPATIENT
Start: 2022-03-20 | End: 2022-03-20 | Stop reason: HOSPADM

## 2022-03-20 RX ORDER — ACETAMINOPHEN 650 MG/1
650 SUPPOSITORY RECTAL
Status: DISCONTINUED | OUTPATIENT
Start: 2022-03-20 | End: 2022-03-20 | Stop reason: HOSPADM

## 2022-03-20 RX ORDER — ACETAMINOPHEN 325 MG/1
650 TABLET ORAL
Status: DISCONTINUED | OUTPATIENT
Start: 2022-03-20 | End: 2022-03-20 | Stop reason: HOSPADM

## 2022-03-20 RX ORDER — POLYETHYLENE GLYCOL 3350 17 G/17G
17 POWDER, FOR SOLUTION ORAL DAILY PRN
Status: DISCONTINUED | OUTPATIENT
Start: 2022-03-20 | End: 2022-03-20 | Stop reason: HOSPADM

## 2022-03-20 RX ADMIN — OXYCODONE AND ACETAMINOPHEN 1 TABLET: 5; 325 TABLET ORAL at 15:20

## 2022-03-20 RX ADMIN — SODIUM CHLORIDE, PRESERVATIVE FREE 10 ML: 5 INJECTION INTRAVENOUS at 14:21

## 2022-03-20 RX ADMIN — ACETAMINOPHEN 650 MG: 325 TABLET ORAL at 14:20

## 2022-03-20 RX ADMIN — HYDRALAZINE HYDROCHLORIDE 10 MG: 20 INJECTION, SOLUTION INTRAMUSCULAR; INTRAVENOUS at 14:23

## 2022-03-20 RX ADMIN — HEPARIN SODIUM 5000 UNITS: 5000 INJECTION, SOLUTION INTRAVENOUS; SUBCUTANEOUS at 14:20

## 2022-03-20 RX ADMIN — AMLODIPINE BESYLATE 10 MG: 5 TABLET ORAL at 14:20

## 2022-03-20 NOTE — ED NOTES
TRANSFER - OUT REPORT:    Verbal report given to Sivan LOPEZ RN (name) on Angella Jimenez  being transferred to [facility]MC[facility] (unit) for routine progression of care       Report consisted of patients Situation, Background, Assessment and Recommendations(SBAR). Information from the following report(s) SBAR, ED Summary, MAR, Recent Results and Med Rec Status was reviewed with the receiving nurse. Lines:       Opportunity for questions and clarification was provided.       Patient transported with:  Monitor  O2 @ 4 liters

## 2022-03-20 NOTE — PROGRESS NOTES
Pt alert and oriented x4 on room air no sign of respiratory distress noted. . I have reviewed discharge medications and instructions with the patient. The patient verbalized understanding. IV removed with no issues.  Discharged via wheelchair to family vehicle

## 2022-03-20 NOTE — PROGRESS NOTES
Admission Medication Reconciliation:    Information obtained from:  Patient/pharmacy/ (partial med Rec)  RxQuery data available¹:  YES    Comments/Recommendations: Updated PTA meds/reviewed patient's allergies. 1) Unable to reconcile patient's home medication list- patient did not have her home medication list or bottles. She was able to recognize her medication, however some of the medication she listed do not have any recent refill record (see item #4). Attempted to follow up with patient, but she was discharged. 2)  Medication changes (since last review): Added  - Amitriptyline (per chart review/pharmacy)       4)  The following medications patient reported active but per [facility]  Pharmacist they were last picked up >6-12 months  -Hydroxychloroquine  -Azathioprine   -prednisone     ¹RxQuery pharmacy benefit data reflects medications filled and processed through the patient's insurance, however   this data does NOT capture whether the medication was picked up or is currently being taken by the patient. Allergies:  Compazine [prochlorperazine edisylate]    Significant PMH/Disease States:   Past Medical History:   Diagnosis Date    Anemia     secondary to lupus    Asthma     no inhaler use in past 2 to 3 years    Carditis     Chronic kidney disease     ESRD    Chronic pain     DDD (degenerative disc disease), lumbar     [facility] ([facility] disease) [facility]     Fibroid tumor     GERD (gastroesophageal reflux disease)     HCAP (healthcare-associated pneumonia) 7/16/2019    Hemodialysis patient ([facility] 12/21/2017    [address]  Tuesday,  Thursday,  and Saturday.      Hypercholesterolemia     Hyperkalemia 3/4/2019    Hypertension     Hypokalemia 10/27/2017    Intra-abdominal abscess ([facility] 5/12/2018    Intractable nausea and vomiting 10/21/2015    Long term (current) use of anticoagulants     Lupus (systemic lupus erythematosus) (HCC)     Malignant hypertension with chronic kidney disease stage V [facility])     Peritoneal dialysis status ([facility] 10/2015    x 2 years Stopped 12/2017 due to infection and removed. Peritonitis ([facility] 3/11/2018    Pneumonia 3/14/2020    Poor historian 01/17/2018    With medications    Sepsis ([facility] 4/29/2018    Thromboembolus ([facility] 2013    lungs    Transfusion history     Last Transfusion 12/21/2017  at [facility]     Chief Complaint for this Admission:    Chief Complaint   Patient presents with    Back Pain    Ankle swelling    Shortness of Breath       Please contact the main inpatient pharmacy with any questions or concerns at [phone] and we will direct you to the clinical pharmacist covering this patient's care while in-house.    Mali Ruiz, PHARMD

## 2022-03-20 NOTE — ED NOTES
Report attempted to floor. Verbal report and admission paperwork given to [facility] provider; time allotted for questions but denied having any at this time. Pt transported out of ED via [facility] stretcher. Pt alert and oriented.

## 2022-03-20 NOTE — ED PROVIDER NOTES
HPI   39 yo F presents with 1.5 weeks of runny nose and bodyaches. Denies fever at home. C/o cough productive of white sputum. C/o sob and b/l ankle swelling. ESRD on dialysis, M/W/F, last yesterday. Nephrology-Dr. Conrad Ambriz. Had covid vaccines but not booster. Denies vomiting or diarrhea. Very little urine output at baseline. Past Medical History:   Diagnosis Date    Anemia     secondary to lupus    Asthma     no inhaler use in past 2 to 3 years    Carditis     Chronic kidney disease     ESRD    Chronic pain     DDD (degenerative disc disease), lumbar     ESRD (end stage renal disease) ([address]     Fibroid tumor     GERD (gastroesophageal reflux disease)     HCAP (healthcare-associated pneumonia) 2019    Hemodialysis patient ([address] 2017    [address]  Tuesday,  Thursday,  and Saturday. 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  Peritonitis ([address] 3/11/2018    Pneumonia 3/14/2020    Poor historian 2018    With medications    Sepsis ([address] 2018    Thromboembolus ([address] 2013    lungs    Transfusion history     Last Transfusion 2017  at [facility]       Past Surgical History:   Procedure Laterality Date    HX  SECTION  11/2006    x1    HX OTHER SURGICAL  9/16/15    INSERTION PD CATH;  Removed 2017    HX VASCULAR ACCESS Right 2017    Double-Lumen henry catheter upper chest    HX VASCULAR ACCESS Right 2018    right upper arm         Family History:   Problem Relation Age of Onset    Diabetes Father     Hypertension Father     Cancer Other         aunt with breast cancerX2    Diabetes Mother        Social History     Socioeconomic History    Marital status: SINGLE     Spouse name: Not on file    Number of children: Not on file    Years of education: Not on file    Highest education level: Not on file   Occupational History    Not on file   Tobacco Use    Smoking status: Never Smoker    Smokeless tobacco: Never Used   Substance and Sexual Activity    Alcohol use: No    Drug use: Yes     Types: Prescription, OTC    Sexual activity: Not Currently   Other Topics Concern     Service No    Blood Transfusions No    Caffeine Concern No    Occupational Exposure No    Hobby Hazards No    Sleep Concern No    Stress Concern No    Weight Concern No    Special Diet No    Back Care Yes     Comment: low back pain    Exercise No    Bike Helmet No    Seat Belt Yes    Self-Exams No   Social History Narrative    Lives with parents and daughter. Social Determinants of Health     Financial Resource Strain:     Difficulty of Paying Living Expenses: Not on file   Food Insecurity:     Worried About Running Out of Food in the Last Year: Not on file    Fede of Food in the Last Year: Not on file   Transportation Needs:     Lack of Transportation (Medical): Not on file    Lack of Transportation (Non-Medical):  Not on file   Physical Activity:     Days of Exercise per Week: Not on file    Minutes of Exercise per Session: Not on file   Stress:     Feeling of Stress : Not on file   Social Connections:     Frequency of Communication with Friends and Family: Not on file    Frequency of Social Gatherings with Friends and Family: Not on file    Attends Zoroastrianism Services: Not on file    Active Member of Clubs or Organizations: Not on file    Attends Club or Organization Meetings: Not on file    Marital Status: Not on file   Intimate Partner Violence:     Fear of Current or Ex-Partner: Not on file    Emotionally Abused: Not on file    Physically Abused: Not on file    Sexually Abused: Not on file   Housing Stability:     Unable to Pay for Housing in the Last Year: Not on file    Number of Places Lived in the Last Year: Not on file    Unstable Housing in the Last Year: Not on file         ALLERGIES: Compazine [prochlorperazine edisylate]    Review of Systems   Constitutional: Negative for chills and fever. HENT: Positive for congestion and rhinorrhea. Negative for sore throat. Respiratory: Positive for cough and shortness of breath. Cardiovascular: Positive for chest pain and leg swelling. Gastrointestinal: Negative for abdominal pain, diarrhea, nausea and vomiting. Musculoskeletal: Positive for myalgias. Negative for neck pain and neck stiffness. Skin: Negative for rash and wound. Neurological: Negative for headaches. All other systems reviewed and are negative.       Vitals:    03/19/22 2100 03/19/22 2113 03/19/22 2114   BP: (!) 175/116  (!) 172/118   Pulse: (!) 115  (!) 109   Resp: 22  20   Temp: (!) 100.6 °F (38.1 °C)     SpO2:  (!) 87% 96%   Weight: 99.4 kg (219 lb 2.2 oz)     Height: 5' 5\" (1.651 m)              Physical Exam   Physical Examination: General appearance - alert, chronically ill appearing, mild distress, oriented to person, place, and time and normal appearing weight  Eyes - pupils equal and reactive, extraocular eye movements intact  Neck - supple, no significant adenopathy  Chest - tachypnea, nasal oxygen in place, diminished breath sounds b/l with rales L>R  Heart - normal rate, regular rhythm, normal S1, S2, no murmurs, rubs, clicks or gallops  Abdomen - soft, nontender, nondistended, no masses or organomegaly  Back exam - full range of motion, no tenderness, palpable spasm or pain on motion  Neurological - alert, oriented, normal speech, no focal findings or movement disorder noted  Musculoskeletal - no joint tenderness, deformity or swelling  Extremities - peripheral pulses normal, pitting edema b/l LE, no clubbing or cyanosis  Skin - normal coloration and turgor, no rashes, no suspicious skin lesions noted  MDM  Number of Diagnoses or Management Options     Amount and/or Complexity of Data Reviewed  Clinical lab tests: ordered and reviewed  Tests in the radiology section of CPT®: ordered and reviewed  Decide to obtain previous medical records or to obtain history from someone other than the patient: yes  Obtain history from someone other than the patient: yes (mother)  Review and summarize past medical records: yes  Discuss the patient with other providers: yes (hospitalist)  Independent visualization of images, tracings, or specimens: yes    Patient Progress  Patient progress: stable         Procedures  Perfect Serve Consult for Admission  11:54 PM    ED Room Number: SER11/11  Patient Name and age:  Chelsea Carreon 40 y.o.  female  Working Diagnosis:   1. Fever, unspecified fever cause    2. Acute pulmonary edema (HCC)    3. ESRD (end stage renal disease) on dialysis ([facility]        COVID-19 Suspicion:  no  Sepsis present:  no  Reassessment needed: no  Code Status:  Full Code  Readmission: no  Isolation Requirements:  no  Recommended Level of Care:  telemetry  Department:[place]  - [phone]    ED EKG interpretation:  Rhythm: sinus tachycardia; and regular .  Rate (approx.): 106; Axis: normal; P wave: normal; QRS interval: normal ; ST/T wave: non-specific changes  EKG documented by Ashleigh Elise MD

## 2022-03-20 NOTE — ED TRIAGE NOTES
Patient is MWF Dialysis. Since Tuesday she has had increased back pain and bilateral lower leg swelling. SOB has increased.

## 2022-03-20 NOTE — PROGRESS NOTES
Medicare Code 44 letter delivered to patient. CM gave patient the Medicare Outpatient Observation Notice which patient signed-- original to patient and copy to chart.

## 2022-03-20 NOTE — H&P
[address] [facility] Adult  Hospitalist Group  History and Physical    Primary Care Provider: Kanwal Canada NP  Date of Service:  3/20/2022    Chief Complaint: Body aches and back pain    Subjective:     Barney Barahona is a 40 y.o. female  with extensive past medical history of anemia, asthma, carditis, end-stage renal disease on hemodialysis Monday Wednesday and Friday, hypercholesterolemia, hypertension, hypokalemia, PE, among other things who [place] fever and acute pulmonary edema. The patient reports that she has been having on and off fever, runny nose, body aches for about a week and a half. She denies any shortness of breath, chest pain, palpitations, sputum production, cough, headache, nausea, vomiting, diarrhea, or other symptoms. She reports that she was just not feeling well went to the ER. In the ER her x-ray showed slight developing pulmonary edema but she denies any shortness of breath and says that she had her last dialysis session on Friday and is on schedule. She reports feeling fine at this point. Patient is on no oxygen and appears comfortable. At this time the patient just reports some back pain and body aches but no other major complaints. She says that she has been compliant with her medication and up-to-date on her dialysis. Chest x-ray in the ER showed mild interstitial prominence with possible developing interstitial edema. Other lab tests were essentially normal for what her baseline is. Influenza AMB have been negative. Review of Systems:    A comprehensive review of systems was negative except for that written in the History of Present Illness.      Past Medical History:   Diagnosis Date    Anemia     secondary to lupus    Asthma     no inhaler use in past 2 to 3 years    Carditis     Chronic kidney disease     ESRD    Chronic pain     DDD (degenerative disc disease), lumbar     [facility] ([facility] stage [facility] disease) [facility] [address]     Fibroid tumor     GERD (gastroesophageal reflux disease)     HCAP (healthcare-associated pneumonia) 2019    Hemodialysis patient [facility]) 2017    [address]  Tuesday,  Thursday,  and Saturday. 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  Peritonitis ([address] 3/11/2018    Pneumonia 3/14/2020    Poor historian 2018    With medications    Sepsis ([address] 2018    Thromboembolus ([address]     lungs    Transfusion history     Last Transfusion 2017  at [facility]      Past Surgical History:   Procedure Laterality Date    HX  SECTION  11/2006    x1    HX OTHER SURGICAL  9/16/15    INSERTION PD CATH; Removed 2017    HX VASCULAR ACCESS Right 2017    Double-Lumen henry catheter upper chest    HX VASCULAR ACCESS Right 2018    right upper arm     Prior to Admission medications    Medication Sig Start Date End Date Taking? Authorizing Provider   oxyCODONE-acetaminophen (PERCOCET) 5-325 mg per tablet Take 1 Tablet by mouth every eight (8) hours as needed for Pain for up to 14 days. Max Daily Amount: 3 Tablets. 3/16/22 3/30/22  Law Perez MD   norethindrone acetate (AYGESTIN) 5 mg tablet TAKE 1 TABLET BY MOUTH DAILY 21   Law Perez MD   norethindrone acetate (AYGESTIN) 5 mg tablet TAKE 1 TABLET BY MOUTH DAILY 21   Law Perez MD   hydroxychloroquine sulfate (PLAQUENIL PO) Plaquenil    Provider, Historical   ondansetron (Zofran ODT) 4 mg disintegrating tablet Take 1 Tab by mouth every eight (8) hours as needed for Nausea.  3/28/20 Vi Vazquez DO   pantoprazole (PROTONIX) 40 mg tablet Take 1 Tab by mouth ACB/HS. 8/14/19   Becki Arroyo MD   ferric citrate (AURYXIA) 210 mg iron tablet Take 420 mg by mouth three (3) times daily (with meals). Provider, Historical   carvedilol (COREG) 25 mg tablet Take 1 Tab by mouth two (2) times daily (with meals). 8/19/18   Prudence Lay MD   predniSONE (DELTASONE) 5 mg tablet Take 2 Tabs by mouth daily. 6/28/18   Jose Guadalupe Joseph MD   gemfibrozil (LOPID) 600 mg tablet Take 300 mg by mouth daily. 11/3/17   Provider, Historical   azaTHIOprine (IMURAN) 50 mg tablet Take 50 mg by mouth daily (after breakfast). 11/3/17   Provider, Historical     Allergies   Allergen Reactions    Compazine [Prochlorperazine Edisylate] Nausea and Vomiting and Palpitations     \"hot and lightheaded\"      Family History   Problem Relation Age of Onset    Diabetes Father     Hypertension Father     Cancer Other         aunt with breast cancerX2    Diabetes Mother         SOCIAL HISTORY:  Patient resides at Home. Patient ambulates with independence. Smoking history: never  Alcohol history: no        Objective:       Physical Exam:   Physical Exam  Vitals reviewed. Constitutional:       General: She is not in acute distress. Appearance: Normal appearance. She is not ill-appearing or toxic-appearing. HENT:      Head: Normocephalic and atraumatic. Nose: Rhinorrhea present. No congestion. Mouth/Throat:      Mouth: Mucous membranes are dry. Pharynx: Oropharynx is clear. No oropharyngeal exudate or posterior oropharyngeal erythema. Eyes:      General: No scleral icterus. Extraocular Movements: Extraocular movements intact. Pupils: Pupils are equal, round, and reactive to light. Cardiovascular:      Rate and Rhythm: Normal rate and regular rhythm. Pulses: Normal pulses. Heart sounds: Normal heart sounds. No murmur heard. No friction rub. No gallop.     Pulmonary: Effort: Pulmonary effort is normal.      Breath sounds: Rales (Slight diffuse crackles. ) present. Abdominal:      General: Bowel sounds are normal. There is no distension. Palpations: Abdomen is soft. There is no mass. Tenderness: There is no abdominal tenderness. There is no guarding or rebound. Hernia: No hernia is present. Musculoskeletal:         General: Normal range of motion. Cervical back: Normal range of motion and neck supple. No rigidity or tenderness. Right lower leg: Edema (1-2+. ) present. Left lower leg: Edema (1-2+. ) present. Skin:     General: Skin is warm and dry. Capillary Refill: Capillary refill takes less than 2 seconds. Coloration: Skin is not jaundiced or pale. Findings: No bruising or erythema. Neurological:      General: No focal deficit present. Mental Status: She is alert and oriented to person, place, and time. Cranial Nerves: No cranial nerve deficit. Psychiatric:         Mood and Affect: Mood normal.         Behavior: Behavior normal.         Thought Content: Thought content normal.         Judgment: Judgment normal.       ECG:  Sinus tachycardia     Data Review: All diagnostic labs and studies have been reviewed. Chest x-ray Mild edema. .    Assessment:     Active Problems:    Respiratory failure ([address] (3/20/2022)    Pulmonary edema    Rhinorrhea and body aches    Back pain    End-stage renal disease on hemodialysis Monday, Wednesday, Friday    Hypertension    Lupus    Plan:      Respiratory failure ([address] (3/20/2022)  Patient is on room air and oxygen saturation is normal.  Monitor  We will give supplemental oxygen if needed    Pulmonary edema  Mild  No signs of pulmonary/respiratory distress due to this  Likely due to end-stage renal disease with patient is up-to-date on her dialysis  Monitor    Rhinorrhea and body aches  Likely viral infection  Acetaminophen for pain and fever    Back pain  Acetaminophen    End-stage renal disease on hemodialysis Monday, Wednesday, Friday  Last hemodialysis Friday  No need to consult nephrology at this point as the patient does not need urgent dialysis.   If the patient is to stay until Monday and I will consult nephrology for dialysis    Hypertension  Restarting amlodipine that the patient says she is on as outpatient  Reconciling medications per pharmacy to restart other medications  Hydralazine for systolic blood pressure above 539 or diastolic blood pressure above 100    Lupus  We will restart home medications once reconciled      FUNCTIONAL STATUS PRIOR TO HOSPITALIZATION Ambulates Independently (including history of recent falls):       Signed By: Eliezer Hoskins MD     March 20, 2022

## 2022-03-20 NOTE — ED NOTES
Unable to provide continuous SPO2 monitoring for patient d/t equipment failure. Multiple SPO2 cords were used without success. Patient is wearing a portable SPO2 monitor.

## 2022-03-21 ENCOUNTER — PATIENT OUTREACH (OUTPATIENT)
Dept: CASE MANAGEMENT | Age: 37
End: 2022-03-21

## 2022-03-21 NOTE — PROGRESS NOTES
Care Transitions Initial Call    Call within 2 business days of discharge: Yes     Patient: Maco Rodney Patient : 1985 MRN: [id number]    Last Discharge [address]       Complaint Diagnosis Description Type Department Provider    3/19/22 Back Pain; Ankle swelling; Shortness of Breath Fever, unspecified fever cause . .. ED to Hosp-Admission (Discharged) (ADMIT) Fidencio Heredia MD; Emiliana Lazaro,... Was this an external facility discharge? No     Challenges to be reviewed by the provider   Additional needs identified to be addressed with provider: no       Method of communication with provider : none    Discussed COVID-19 related testing which was available at this time. Test results were negative. Patient informed of results, if available? no     Advance Care Planning:   Does patient have an Advance Directive:  education deferred to future outreach. Inpatient Readmission Risk score: Unplanned Readmit Risk Score: 16.9 ( )    Was this a readmission? no   Patient stated reason for the admission: back pain    Patients top risk factors for readmission: medical condition-ESRD   Interventions to address risk factors: Obtained and reviewed discharge summary and/or continuity of care documents and sees nephro at dialysis appts    Care Transition Nurse (CTN) contacted the patient by telephone to perform post hospital discharge assessment. Verified name and  with patient as identifiers. Provided introduction to self, and explanation of the CTN role. CTN reviewed discharge instructions, medical action plan and red flags with patient who verbalized understanding. Were discharge instructions available to patient? yes. Reviewed appropriate site of care based on symptoms and resources available to patient including: PCP and Specialist. Patient given an opportunity to ask questions and does not have any further questions or concerns at this time.  The patient agrees to contact the PCP office for questions related to their healthcare. Medication reconciliation was performed with patient, who verbalizes understanding of administration of home medications. Advised obtaining a 90-day supply of all daily and as-needed medications. Referral to Pharm D needed: no     Home Health/Outpatient orders at discharge: none    Durable Medical Equipment ordered at discharge: None    Covid Risk Education    Educated patient about risk for severe COVID-19 due to risk factors according to CDC guidelines. CTN reviewed discharge instructions, medical action plan and red flag symptoms with the patient who verbalized understanding. Discussed COVID vaccination status: no. Education provided on COVID-19 vaccination as appropriate. Discussed exposure protocols and quarantine with CDC Guidelines. Patient was given an opportunity to verbalize any questions and concerns and agrees to contact CTN or health care provider for questions related to their healthcare. Was patient discharged with a pulse oximeter? no. Discussed and confirmed pulse oximeter discharge instructions and when to notify provider or seek emergency care. Discussed follow-up appointments. If no appointment was previously scheduled, appointment scheduling offered: patient will see nephro at dialysis center this week. . Is follow up appointment scheduled within 7 days of discharge? yes. [facility] follow up appointment(s):   Future Appointments   Date Time Provider Rocio Hardy   5/12/2022 11:40 AM Leola Girard MD NEUROWTC BS [facility]     Non-[facility] follow up appointment(s): patient will see nephro at dialysis center this week. Plan for follow-up call in 7-10 days based on severity of symptoms and risk factors. Plan for next call: symptom management-fever, SOB edema and follow up appointment-nephro  CTN provided contact information for future needs.     Goals Addressed                 This Visit's Progress     Prevent complications post hospitalization. 3/21/22 Report attending dialysis today as directed, denies chest pain, SOB, fever, n/v/d. Will monitor resp S&S, attend dialysis (MW), schedule with nephroLorenzo MADERA

## 2022-03-24 PROBLEM — J96.90 RESPIRATORY FAILURE (HCC): Status: RESOLVED | Noted: 2022-03-20 | Resolved: 2022-03-20

## 2022-03-24 PROBLEM — J96.90 RESPIRATORY FAILURE (HCC): Status: ACTIVE | Noted: 2022-03-20

## 2022-03-25 LAB
BACTERIA SPEC CULT: NORMAL
BACTERIA SPEC CULT: NORMAL
SERVICE CMNT-IMP: NORMAL
SERVICE CMNT-IMP: NORMAL

## 2022-03-28 DIAGNOSIS — N18.6 ESRD ON HEMODIALYSIS (HCC): ICD-10-CM

## 2022-03-28 DIAGNOSIS — R10.2 PELVIC PAIN: ICD-10-CM

## 2022-03-28 DIAGNOSIS — Z99.2 ESRD ON HEMODIALYSIS (HCC): ICD-10-CM

## 2022-03-28 NOTE — TELEPHONE ENCOUNTER
Pt calling for refill     Requested Prescriptions     Pending Prescriptions Disp Refills    oxyCODONE-acetaminophen (PERCOCET) 5-325 mg per tablet 20 Tablet 0     Sig: Take 1 Tablet by mouth every eight (8) hours as needed for Pain for up to 14 days. Max Daily Amount: 3 Tablets.

## 2022-03-29 ENCOUNTER — PATIENT OUTREACH (OUTPATIENT)
Dept: CASE MANAGEMENT | Age: 37
End: 2022-03-29

## 2022-03-29 NOTE — PROGRESS NOTES
Care Transitions Follow Up Call    Care Transition Nurse (CTN) contacted the patient by telephone to follow up after admission on 3/19/22. Verified name and  with patient as identifiers. Addressed changes since last contact: Follow up appointment completed? yes. Was follow up appointment scheduled within 7 days of discharge? yes. [facility] follow up appointment(s):   Future Appointments   Date Time Provider Rocio Hardy   2022 11:40 AM Juju Ortega  S [facility]     Non-[facility] follow up appointment(s): PCP 3/31/22    CTN provided contact information for future needs. Plan for follow-up call in 7-10 days based on severity of symptoms and risk factors. Plan for next call: symptom management-resp S&S and follow up appointment-PCP     Goals Addressed                 This Visit's Progress     Prevent complications post hospitalization. 3/21/22 Report attending dialysis today as directed, denies chest pain, SOB, fever, n/v/d. Will monitor resp S&S, attend dialysis (MWF), schedule with nephro. [facility]    3/29/22  Patient reports she saw nephro and no longer needs dialysis. Denies SOB, fever,and edema. Endorse non productive cough. Appt with PCP 3/31/22. Will monitor resp S&S and report attendance of PCP appt with any change to plan of care at next outreach.  [facility]

## 2022-03-30 RX ORDER — OXYCODONE AND ACETAMINOPHEN 5; 325 MG/1; MG/1
1 TABLET ORAL
Qty: 20 TABLET | Refills: 0 | Status: SHIPPED | OUTPATIENT
Start: 2022-03-30 | End: 2022-04-11 | Stop reason: SDUPTHER

## 2022-04-05 ENCOUNTER — PATIENT OUTREACH (OUTPATIENT)
Dept: CASE MANAGEMENT | Age: 37
End: 2022-04-05

## 2022-04-05 NOTE — PROGRESS NOTES
Care Transitions Follow Up Call    Care Transition Nurse (CTN) contacted the patient by telephone to follow up after admission on 3/19/22. Verified name and  with patient as identifiers.     Addressed changes since last contact: Follow up appointment completed? yes. Was follow up appointment scheduled within 7 days of discharge? yes. [facility] follow up appointment(s):   Future Appointments   Date Time Provider Rocio Hayley   2022 11:40 AM Sita Mcdaniel MD NEUROWTC BS [facility]     CTN provided contact information for future needs. Plan for follow-up call in 5-7 days based on severity of symptoms and risk factors. Plan for next call: symptom management-SOB, fever, cough, edema     Goals Addressed                 This Visit's Progress     Prevent complications post hospitalization. 3/21/22 Report attending dialysis today as directed, denies chest pain, SOB, fever, n/v/d. Will monitor resp S&S, attend dialysis (MW), schedule with nephro. [facility] [place]    3/29/22  Patient reports she saw nephro and no longer needs dialysis. Denies SOB, fever,and edema. Endorse non productive cough. Appt with PCP 3/31/22. Will monitor resp S&S and report attendance of PCP appt with any change to plan of care at next outreach. [facility] [place]    22 Patient reports attendance to appt for transplant team. Denies SOB, fever,and edema. Endorse non productive cough improvement. Did not attended appt with PCP on 3/31/22. Will monitor resp S&S at next outreach.  [facility] [place]

## 2022-04-11 DIAGNOSIS — Z99.2 ESRD ON HEMODIALYSIS (HCC): ICD-10-CM

## 2022-04-11 DIAGNOSIS — N18.6 ESRD ON HEMODIALYSIS (HCC): ICD-10-CM

## 2022-04-11 DIAGNOSIS — R10.2 PELVIC PAIN: ICD-10-CM

## 2022-04-11 NOTE — TELEPHONE ENCOUNTER
Pt calling to office for refill    Requested Prescriptions     Pending Prescriptions Disp Refills    oxyCODONE-acetaminophen (PERCOCET) 5-325 mg per tablet 20 Tablet 0     Sig: Take 1 Tablet by mouth every eight (8) hours as needed for Pain for up to 14 days. Max Daily Amount: 3 Tablets.

## 2022-04-12 ENCOUNTER — PATIENT OUTREACH (OUTPATIENT)
Dept: CASE MANAGEMENT | Age: 37
End: 2022-04-12

## 2022-04-12 RX ORDER — OXYCODONE AND ACETAMINOPHEN 5; 325 MG/1; MG/1
1 TABLET ORAL
Qty: 20 TABLET | Refills: 0 | Status: SHIPPED | OUTPATIENT
Start: 2022-04-12 | End: 2022-04-25 | Stop reason: SDUPTHER

## 2022-04-12 NOTE — PROGRESS NOTES
Care Transitions Follow Up Call     Care Transition Nurse (CTN) contacted the patient by telephone to follow up after admission on 3/19/22. Verified name and  with patient as identifiers.     Addressed changes since last contact: Follow up appointment completed? yes. Was follow up appointment scheduled within 7 days of discharge? yes. [facility] follow up appointment(s):   Future Appointments   Date Time Provider Rocio Hardy   2022 11:40 AM Henry Martell  S [facility]     Non-[facility] follow up appointment(s): transplant team 22    CTN provided contact information for future needs. Plan for follow-up call in 7-10 days based on severity of symptoms and risk factors. Plan for next call: close episode     Goals Addressed                 This Visit's Progress     Prevent complications post hospitalization. 3/21/22 Report attending dialysis today as directed, denies chest pain, SOB, fever, n/v/d. Will monitor resp S&S, attend dialysis (MWF), schedule with nephro. [facility]    3/29/22  Patient reports she saw nephro and no longer needs dialysis. Denies SOB, fever,and edema. Endorse non productive cough. Appt with PCP 3/31/22. Will monitor resp S&S and report attendance of PCP appt with any change to plan of care at next outreach. [facility]    22 Patient reports attendance to appt for transplant team. Denies SOB, fever,and edema. Endorse non productive cough improvement. Did not attended appt with PCP on 3/31/22. Will monitor resp S&S at next outreach. [facility]    22 Patient report she is currently at transplant team appt. Denies SOB, chest pain, fever, n/v/d. Patient will monitor resp S&S and report at next week outreach.  [facility]

## 2022-04-20 ENCOUNTER — TELEPHONE (OUTPATIENT)
Dept: GYNECOLOGY | Age: 37
End: 2022-04-20

## 2022-04-20 DIAGNOSIS — N18.6 ESRD ON HEMODIALYSIS (HCC): ICD-10-CM

## 2022-04-20 DIAGNOSIS — R10.2 PELVIC PAIN: ICD-10-CM

## 2022-04-20 DIAGNOSIS — Z99.2 ESRD ON HEMODIALYSIS (HCC): ICD-10-CM

## 2022-04-20 NOTE — TELEPHONE ENCOUNTER
Called pt to let her know that it is too early to refill rx for oxycodone, will hold until Monday 4/25/22 and send to Dr. [facility] at that time for refill on 4/26/22

## 2022-04-21 ENCOUNTER — PATIENT OUTREACH (OUTPATIENT)
Dept: CASE MANAGEMENT | Age: 37
End: 2022-04-21

## 2022-04-21 NOTE — PROGRESS NOTES
Patient has graduated from the Transitions of Care Coordination  program on 4/21/22. Patient/family has the ability to self-manage at this time Care management goals have been completed. Patient was not referred to the [facility] team for further management. Goals Addressed                 This Visit's Progress     COMPLETED: Prevent complications post hospitalization. 3/21/22 Report attending dialysis today as directed, denies chest pain, SOB, fever, n/v/d. Will monitor resp S&S, attend dialysis (MW), schedule with nephro. \[facility]\""    3/29/22  Patient reports she saw nephro and no longer needs dialysis. Denies SOB, fever,and edema. Endorse non productive cough. Appt with PCP 3/31/22. Will monitor resp S&S and report attendance of PCP appt with any change to plan of care at next outreach. \[facility]\""    4/5/22 Patient reports attendance to appt for transplant team. Denies SOB, fever,and edema. Endorse non productive cough improvement. Did not attended appt with PCP on 3/31/22. Will monitor resp S&S at next outreach. \[facility]\""    4/12/22 Patient report she is currently at transplant team appt. Denies SOB, chest pain, fever, n/v/d. Patient will monitor resp S&S and report at next week outreach. \[facility]\""    4/21/22 Denies SOB, chest pain, fever, n/v/d. Per review of chart patient has had no ED or hospital admissions 30 days post discharge. Goal complete. \[facility]\""             Patient has Care Transition Nurse's contact information for any further questions, concerns, or needs.   Patients upcoming visits:    Future Appointments   Date Time Provider Rocio Hardy   5/12/2022 11:40 AM Archana Chapman MD NEUROWTC BS [facility]

## 2022-04-25 RX ORDER — OXYCODONE AND ACETAMINOPHEN 5; 325 MG/1; MG/1
1 TABLET ORAL
Qty: 20 TABLET | Refills: 0 | Status: SHIPPED | OUTPATIENT
Start: 2022-04-25 | End: 2022-05-05 | Stop reason: SDUPTHER

## 2022-04-25 NOTE — TELEPHONE ENCOUNTER
Pt calling for refill on     Requested Prescriptions     Pending Prescriptions Disp Refills    oxyCODONE-acetaminophen (PERCOCET) 5-325 mg per tablet 20 Tablet 0     Sig: Take 1 Tablet by mouth every eight (8) hours as needed for Pain for up to 14 days. Max Daily Amount: 3 Tablets.

## 2022-04-28 ENCOUNTER — OFFICE VISIT (OUTPATIENT)
Dept: NEUROLOGY | Age: 37
End: 2022-04-28
Payer: MEDICARE

## 2022-04-28 VITALS
DIASTOLIC BLOOD PRESSURE: 72 MMHG | HEIGHT: 65 IN | BODY MASS INDEX: 31.65 KG/M2 | SYSTOLIC BLOOD PRESSURE: 122 MMHG | HEART RATE: 66 BPM | RESPIRATION RATE: 16 BRPM | WEIGHT: 190 LBS | OXYGEN SATURATION: 99 %

## 2022-04-28 DIAGNOSIS — Z94.0 HISTORY OF RENAL TRANSPLANT: ICD-10-CM

## 2022-04-28 DIAGNOSIS — G47.00 INSOMNIA, UNSPECIFIED TYPE: Primary | ICD-10-CM

## 2022-04-28 DIAGNOSIS — R56.9 SINGLE SEIZURE (HCC): ICD-10-CM

## 2022-04-28 PROCEDURE — G9231 DOC ESRD DIA TRANS PREG: HCPCS | Performed by: PSYCHIATRY & NEUROLOGY

## 2022-04-28 PROCEDURE — G8427 DOCREV CUR MEDS BY ELIG CLIN: HCPCS | Performed by: PSYCHIATRY & NEUROLOGY

## 2022-04-28 PROCEDURE — G8417 CALC BMI ABV UP PARAM F/U: HCPCS | Performed by: PSYCHIATRY & NEUROLOGY

## 2022-04-28 PROCEDURE — 99214 OFFICE O/P EST MOD 30 MIN: CPT | Performed by: PSYCHIATRY & NEUROLOGY

## 2022-04-28 PROCEDURE — G8510 SCR DEP NEG, NO PLAN REQD: HCPCS | Performed by: PSYCHIATRY & NEUROLOGY

## 2022-04-28 RX ORDER — AMITRIPTYLINE HYDROCHLORIDE 150 MG/1
150 TABLET, FILM COATED ORAL
Qty: 90 TABLET | Refills: 1 | Status: SHIPPED | OUTPATIENT
Start: 2022-04-28 | End: 2022-06-27 | Stop reason: SDUPTHER

## 2022-04-28 NOTE — Clinical Note
4/28/2022    Patient: Behzad Wayne   YOB: 1985   Date of Visit: 4/28/2022     Wilman Bonilla NP  Via     Dear Wilman Bonilla NP,      Thank you for referring Ms. Tayler Galvan to [facility] for evaluation. My notes for this consultation are attached. If you have questions, please do not hesitate to call me. I look forward to following your patient along with you.       Sincerely,    Jose De Jesus Wynne MD

## 2022-04-28 NOTE — PROGRESS NOTES
Chief Complaint   Patient presents with    Follow-up     6 months follow up, medication refill, amitriptyline, s/p kidney transplant 3/22/22, doing well      Visit Vitals  /72   Pulse 66   Resp 16   Ht 5' 5\" (1.651 m)   Wt 86.2 kg (190 lb)   SpO2 99%   BMI 31.62 kg/m²

## 2022-04-28 NOTE — PROGRESS NOTES
Elham Mccarty (1985) is a 40 y.o. female, established patient, here for evaluation of the following     Chief complaint(s):   Chief Complaint   Patient presents with    Follow-up     6 months follow up, medication refill, amitriptyline, s/p kidney transplant 3/22/22, doing well        SUBJECTIVE/ OBJECTIVE:    HPI: 40 y.o. female ESRD/ HD    1) Has undergone renal transplant 3-22-22  Reports things are going well so far    2) Chronic insomnia  Taking Amitriptyline 150 mg QHS  Continues to report that it helps her sleep  Denies any daytime side effect  Wants to continue taking it      ==========================    Brief Hx/ Prior Data:     Hx of Single Episode Seizure (Oct 2019)    EMR triggered me to look into seizure history. I reviewed EMR and discussed with patient. Per a Neurology Consult note at [facility] [address] Memo Roca, on 818-19: 71-year-old woman followed by [facility] neurology admitted here for possible seizure. She does not remember the event. Apparently she was confused afterwards. according to the medical record she had a convulsive event about 4 min long after dialysis today. Foaming of the mouth. No tongue biting or incontinence. she has no history of seizures she is aware of. She feels her baseline now completely. She is encountering significant stress due to the unexpected passing of her brother due to an overdose. EEG done on 10-19-19 showed mild encephalopathic pattern, non-specific, no epileptiform discharges were seen. Brain MRI done during that admission was normal. Patient denies having any seizure-like episode prior to or after the one above in Oct 2019         Allergies   Allergen Reactions    Compazine [Prochlorperazine Edisylate] Nausea and Vomiting and Palpitations     \"hot and lightheaded\"         Current Outpatient Medications:     amitriptyline (ELAVIL) 150 mg tablet, Take 1 Tablet by mouth nightly for 90 days.  Chronic insomnia., Disp: 90 Tablet, Rfl: 1    oxyCODONE-acetaminophen (PERCOCET) 5-325 mg per tablet, Take 1 Tablet by mouth every eight (8) hours as needed for Pain for up to 14 days. Max Daily Amount: 3 Tablets. , Disp: 20 Tablet, Rfl: 0    norethindrone acetate (AYGESTIN) 5 mg tablet, TAKE 1 TABLET BY MOUTH DAILY, Disp: 90 Tablet, Rfl: 5    hydroxychloroquine sulfate (PLAQUENIL PO), Plaquenil, Disp: , Rfl:     ondansetron (Zofran ODT) 4 mg disintegrating tablet, Take 1 Tab by mouth every eight (8) hours as needed for Nausea., Disp: 15 Tab, Rfl: 0    pantoprazole (PROTONIX) 40 mg tablet, Take 1 Tab by mouth ACB/HS. , Disp: 60 Tab, Rfl: 0    ferric citrate (AURYXIA) 210 mg iron tablet, Take 420 mg by mouth three (3) times daily (with meals). , Disp: , Rfl:     carvedilol (COREG) 25 mg tablet, Take 1 Tab by mouth two (2) times daily (with meals). , Disp: 60 Tab, Rfl: 0    predniSONE (DELTASONE) 5 mg tablet, Take 2 Tabs by mouth daily. , Disp: 30 Tab, Rfl: 0    gemfibrozil (LOPID) 600 mg tablet, Take 300 mg by mouth daily. , Disp: , Rfl:     azaTHIOprine (IMURAN) 50 mg tablet, Take 50 mg by mouth daily (after breakfast). , Disp: , Rfl:     norethindrone acetate (AYGESTIN) 5 mg tablet, TAKE 1 TABLET BY MOUTH DAILY, Disp: 90 Tablet, Rfl: 3     has a past medical history of Anemia, Asthma, Carditis, Chronic kidney disease, Chronic pain, DDD (degenerative disc disease), lumbar, ESRD (end stage renal disease) ([facility] Fibroid tumor, GERD (gastroesophageal reflux disease), HCAP (healthcare-associated pneumonia) (7/16/2019), Hemodialysis patient ([facility] (12/21/2017), Hypercholesterolemia, Hyperkalemia (3/4/2019), Hypertension, Hypokalemia (10/27/2017), Intra-abdominal abscess ([facility] (5/12/2018), Intractable nausea and vomiting (10/21/2015), Long term (current) use of anticoagulants, Lupus (systemic lupus erythematosus) ([facility] Malignant hypertension with chronic kidney disease stage V ([facility] Peritoneal dialysis status ([facility] (10/2015), Peritonitis ([facility] (3/11/2018), Pneumonia (3/14/2020), Poor historian (2018), Sepsis ([facility] (2018), Thromboembolus ([facility] (), and Transfusion history. She has no past medical history of Adverse effect of anesthesia, Difficult intubation, Malignant hyperthermia due to anesthesia, or Pseudocholinesterase deficiency. has a past surgical history that includes hx  section (2006); hx other surgical (9/16/15); hx vascular access (Right, 2017); and hx vascular access (Right, ). Physical Exam:    Vitals:    22 0844   BP: 122/72   Pulse: 66   Resp: 16   Height: 5' 5\" (1.651 m)   Weight: 86.2 kg (190 lb)   SpO2: 99%     Awake, alert conversant  EOMI, hearing speech normal, visual fields normal  No resting tremors  Mild right upper extremity postural tremor, no left postural tremor  Gait not observed today    ========================================    ASSESSMENT/ PLAN:       ICD-10-CM ICD-9-CM    1. Insomnia, unspecified type  G47.00 780.52 amitriptyline (ELAVIL) 150 mg tablet   2. History of renal transplant  Z94.0 V42.0    3. Single seizure ([facility]  R56.9 780.39         1) Insomnia  Continue Amitriptyline 150 mg one tablet QHS for chronic insomnia. Sent 90 day Rx + 1 RF    2) Hx of single seizure in Oct 2019  Cause unclear. None before or after  No indication to add AED today    3) Hx of renal transplant  Per Nephrology, Transplant Specialist    4) F/u in 5 months      An electronic signature was used to authenticate this note.   -- Nora Barlow MD

## 2022-05-05 DIAGNOSIS — Z99.2 ESRD ON HEMODIALYSIS (HCC): ICD-10-CM

## 2022-05-05 DIAGNOSIS — N18.6 ESRD ON HEMODIALYSIS (HCC): ICD-10-CM

## 2022-05-05 DIAGNOSIS — R10.2 PELVIC PAIN: ICD-10-CM

## 2022-05-05 RX ORDER — OXYCODONE AND ACETAMINOPHEN 5; 325 MG/1; MG/1
1 TABLET ORAL
Qty: 20 TABLET | Refills: 0 | Status: SHIPPED | OUTPATIENT
Start: 2022-05-05 | End: 2022-05-26 | Stop reason: SDUPTHER

## 2022-05-08 ENCOUNTER — HOSPITAL ENCOUNTER (EMERGENCY)
Age: 37
Discharge: HOME | End: 2022-05-08
Attending: EMERGENCY MEDICINE
Payer: MEDICARE

## 2022-05-08 VITALS
HEART RATE: 80 BPM | SYSTOLIC BLOOD PRESSURE: 122 MMHG | WEIGHT: 192.9 LBS | BODY MASS INDEX: 32.1 KG/M2 | TEMPERATURE: 98.8 F | DIASTOLIC BLOOD PRESSURE: 86 MMHG | RESPIRATION RATE: 16 BRPM | OXYGEN SATURATION: 99 %

## 2022-05-08 DIAGNOSIS — R35.0 URINARY FREQUENCY: Primary | ICD-10-CM

## 2022-05-08 DIAGNOSIS — J00 ACUTE RHINITIS: ICD-10-CM

## 2022-05-08 LAB
APPEARANCE UR: CLEAR
BACTERIA URNS QL MICRO: NEGATIVE /HPF
BILIRUB UR QL: NEGATIVE
COLOR UR: ABNORMAL
COVID-19 RAPID TEST, COVR: NOT DETECTED
EPITH CASTS URNS QL MICRO: ABNORMAL /LPF
GLUCOSE UR STRIP.AUTO-MCNC: NEGATIVE MG/DL
HGB UR QL STRIP: NEGATIVE
KETONES UR QL STRIP.AUTO: NEGATIVE MG/DL
LEUKOCYTE ESTERASE UR QL STRIP.AUTO: NEGATIVE
NITRITE UR QL STRIP.AUTO: NEGATIVE
PH UR STRIP: 6 [PH] (ref 5–8)
PROT UR STRIP-MCNC: ABNORMAL MG/DL
RBC #/AREA URNS HPF: ABNORMAL /HPF (ref 0–5)
SARS-COV-2, COV2: NORMAL
SARS-COV-2, XPLCVT: NOT DETECTED
SOURCE, COVRS: NORMAL
SOURCE, COVRS: NORMAL
SP GR UR REFRACTOMETRY: 1.02 (ref 1–1.03)
UR CULT HOLD, URHOLD: NORMAL
UROBILINOGEN UR QL STRIP.AUTO: 0.2 EU/DL (ref 0.2–1)
WBC URNS QL MICRO: ABNORMAL /HPF (ref 0–4)

## 2022-05-08 PROCEDURE — U0005 INFEC AGEN DETEC AMPLI PROBE: HCPCS

## 2022-05-08 PROCEDURE — 87635 SARS-COV-2 COVID-19 AMP PRB: CPT

## 2022-05-08 PROCEDURE — 81001 URINALYSIS AUTO W/SCOPE: CPT

## 2022-05-08 PROCEDURE — 99283 EMERGENCY DEPT VISIT LOW MDM: CPT

## 2022-05-08 RX ORDER — FAMOTIDINE 40 MG/1
40 TABLET, FILM COATED ORAL DAILY
COMMUNITY

## 2022-05-08 RX ORDER — TACROLIMUS 1 MG/1
1 CAPSULE ORAL EVERY 12 HOURS
COMMUNITY

## 2022-05-08 RX ORDER — AMLODIPINE BESYLATE 5 MG/1
5 TABLET ORAL DAILY
COMMUNITY

## 2022-05-08 RX ORDER — MYCOPHENOLIC ACID 360 MG/1
180 TABLET, DELAYED RELEASE ORAL 2 TIMES DAILY
COMMUNITY

## 2022-05-08 RX ORDER — SENNOSIDES 8.6 MG/1
2 CAPSULE, GELATIN COATED ORAL
COMMUNITY

## 2022-05-08 NOTE — ED TRIAGE NOTES
Pt c/o runny nose, chills, and muscle aches x 2-3 days. Pt has kidney transplant and told to come in for covid screening.

## 2022-05-08 NOTE — ED PROVIDER NOTES
The history is provided by the patient. Cough  This is a new problem. Episode onset: 3 days ago. The problem occurs constantly. The problem has not changed since onset. The cough is non-productive. There has been no fever. Associated symptoms include chills, rhinorrhea and myalgias. Pertinent negatives include no sore throat, no shortness of breath and no vomiting. She has tried nothing for the symptoms. Her past medical history does not include pneumonia. Past medical history comments: s/p DDKT 2 months ago. Past Medical History:   Diagnosis Date    Anemia     secondary to lupus    Asthma     no inhaler use in past 2 to 3 years    Carditis     Chronic kidney disease     ESRD    Chronic pain     DDD (degenerative disc disease), lumbar     ESRD (end stage renal disease) ([address]     Fibroid tumor     GERD (gastroesophageal reflux disease)     HCAP (healthcare-associated pneumonia) 2019    Hemodialysis patient ([address] 2017    [address]  Tuesday,  Thursday,  and Saturday. 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  Peritonitis ([address] 3/11/2018    Pneumonia 3/14/2020    Poor historian 2018    With medications    Sepsis ([address] 2018    Thromboembolus ([address] 2013    lungs    Transfusion history     Last Transfusion 2017  at [facility]       Past Surgical History:   Procedure Laterality Date    HX  SECTION  11/2006    x1    HX OTHER SURGICAL  9/16/15    INSERTION PD CATH;  Removed 2017    HX VASCULAR ACCESS Right 2017    Double-Lumen henry catheter upper chest    HX VASCULAR ACCESS Right 2018    right upper arm         Family History:   Problem Relation Age of Onset    Diabetes Father     Hypertension Father     Cancer Other         aunt with breast cancerX2    Diabetes Mother        Social History     Socioeconomic History    Marital status: SINGLE     Spouse name: Not on file    Number of children: Not on file    Years of education: Not on file    Highest education level: Not on file   Occupational History    Not on file   Tobacco Use    Smoking status: Never Smoker    Smokeless tobacco: Never Used   Substance and Sexual Activity    Alcohol use: No    Drug use: Yes     Types: Prescription, OTC    Sexual activity: Not Currently   Other Topics Concern     Service No    Blood Transfusions No    Caffeine Concern No    Occupational Exposure No    Hobby Hazards No    Sleep Concern No    Stress Concern No    Weight Concern No    Special Diet No    Back Care Yes     Comment: low back pain    Exercise No    Bike Helmet No    Seat Belt Yes    Self-Exams No   Social History Narrative    Lives with parents and daughter. Social Determinants of Health     Financial Resource Strain:     Difficulty of Paying Living Expenses: Not on file   Food Insecurity:     Worried About Running Out of Food in the Last Year: Not on file    Fede of Food in the Last Year: Not on file   Transportation Needs:     Lack of Transportation (Medical): Not on file    Lack of Transportation (Non-Medical):  Not on file   Physical Activity:     Days of Exercise per Week: Not on file    Minutes of Exercise per Session: Not on file   Stress:     Feeling of Stress : Not on file   Social Connections:     Frequency of Communication with Friends and Family: Not on file    Frequency of Social Gatherings with Friends and Family: Not on file    Attends Bahai Services: Not on file    Active Member of Clubs or Organizations: Not on file   Danny Attends Club or Organization Meetings: Not on file    Marital Status: Not on file   Intimate Partner Violence:     Fear of Current or Ex-Partner: Not on file    Emotionally Abused: Not on file    Physically Abused: Not on file    Sexually Abused: Not on file   Housing Stability:     Unable to Pay for Housing in the Last Year: Not on file    Number of [place] in the Last Year: Not on file    Unstable Housing in the Last Year: Not on file         ALLERGIES: Compazine [prochlorperazine edisylate]    Review of Systems   Constitutional: Positive for chills. HENT: Positive for rhinorrhea. Negative for sore throat. Respiratory: Positive for cough. Negative for shortness of breath. Gastrointestinal: Negative for vomiting. Musculoskeletal: Positive for myalgias. All other systems reviewed and are negative. Vitals:    05/08/22 0530   BP: 119/84   Pulse: 82   Resp: 17   Temp: 98.8 °F (37.1 °C)   SpO2: 99%   Weight: 87.5 kg (192 lb 14.4 oz)            Physical Exam  Vitals and nursing note reviewed. Constitutional:       General: She is not in acute distress. Appearance: She is well-developed. HENT:      Head: Normocephalic and atraumatic. Eyes:      Conjunctiva/sclera: Conjunctivae normal.   Cardiovascular:      Rate and Rhythm: Normal rate and regular rhythm. Heart sounds: Normal heart sounds. Pulmonary:      Effort: Pulmonary effort is normal. No respiratory distress. Breath sounds: Normal breath sounds. Abdominal:      General: There is no distension. Musculoskeletal:         General: No deformity. Normal range of motion. Cervical back: Neck supple. Skin:     General: Skin is warm and dry. Neurological:      Mental Status: She is alert. Cranial Nerves: No cranial nerve deficit. Psychiatric:         Behavior: Behavior normal.          MDM     40 y.o. female presents with 3 days of dry cough, runny nose, body aches and generally feeling unwell. No fevers. Not hypoxic here. Has had some urinary frequency in the last week. Evaluation for COVID and UA requested by transplant coordinator and will get labs if infection is a concern. Urine is clear, rapid covid is negative. Will send PCR due to immunosuppressed status. Plan to follow up with PCP and transplant team as needed and return precautions discussed for worsening or new concerning symptoms.      Procedures

## 2022-05-17 DIAGNOSIS — R19.00 PELVIC MASS: ICD-10-CM

## 2022-05-17 DIAGNOSIS — N94.6 DYSMENORRHEA: ICD-10-CM

## 2022-05-17 DIAGNOSIS — R10.2 PELVIC PAIN: ICD-10-CM

## 2022-05-18 RX ORDER — TRAMADOL HYDROCHLORIDE 50 MG/1
TABLET ORAL
Qty: 90 TABLET | Refills: 0 | Status: SHIPPED | OUTPATIENT
Start: 2022-05-18 | End: 2022-06-17

## 2022-05-26 DIAGNOSIS — R10.2 PELVIC PAIN: ICD-10-CM

## 2022-05-26 DIAGNOSIS — N18.6 ESRD ON HEMODIALYSIS (HCC): ICD-10-CM

## 2022-05-26 DIAGNOSIS — Z99.2 ESRD ON HEMODIALYSIS (HCC): ICD-10-CM

## 2022-05-26 NOTE — TELEPHONE ENCOUNTER
Pt calling for a refill on her percocet    Requested Prescriptions     Pending Prescriptions Disp Refills    oxyCODONE-acetaminophen (PERCOCET) 5-325 mg per tablet 20 Tablet 0     Sig: Take 1 Tablet by mouth every eight (8) hours as needed for Pain for up to 14 days. Max Daily Amount: 3 Tablets.

## 2022-05-27 RX ORDER — OXYCODONE AND ACETAMINOPHEN 5; 325 MG/1; MG/1
1 TABLET ORAL
Qty: 20 TABLET | Refills: 0 | Status: SHIPPED | OUTPATIENT
Start: 2022-05-27 | End: 2022-06-09 | Stop reason: SDUPTHER

## 2022-06-09 DIAGNOSIS — N18.6 ESRD ON HEMODIALYSIS (HCC): ICD-10-CM

## 2022-06-09 DIAGNOSIS — R10.2 PELVIC PAIN: ICD-10-CM

## 2022-06-09 DIAGNOSIS — Z99.2 ESRD ON HEMODIALYSIS (HCC): ICD-10-CM

## 2022-06-09 NOTE — TELEPHONE ENCOUNTER
Pt  and requesting refill    Requested Prescriptions     Pending Prescriptions Disp Refills    oxyCODONE-acetaminophen (PERCOCET) 5-325 mg per tablet 20 Tablet 0     Sig: Take 1 Tablet by mouth every eight (8) hours as needed for Pain for up to 14 days. Max Daily Amount: 3 Tablets.

## 2022-06-10 RX ORDER — OXYCODONE AND ACETAMINOPHEN 5; 325 MG/1; MG/1
1 TABLET ORAL
Qty: 20 TABLET | Refills: 0 | Status: SHIPPED | OUTPATIENT
Start: 2022-06-10 | End: 2022-06-23 | Stop reason: SDUPTHER

## 2022-06-23 DIAGNOSIS — Z99.2 ESRD ON HEMODIALYSIS (HCC): ICD-10-CM

## 2022-06-23 DIAGNOSIS — R10.2 PELVIC PAIN: ICD-10-CM

## 2022-06-23 DIAGNOSIS — N18.6 ESRD ON HEMODIALYSIS (HCC): ICD-10-CM

## 2022-06-23 RX ORDER — OXYCODONE AND ACETAMINOPHEN 5; 325 MG/1; MG/1
1 TABLET ORAL
Qty: 20 TABLET | Refills: 0 | Status: SHIPPED | OUTPATIENT
Start: 2022-06-23 | End: 2022-07-07 | Stop reason: SDUPTHER

## 2022-06-30 ENCOUNTER — TELEPHONE (OUTPATIENT)
Dept: NEUROLOGY | Age: 37
End: 2022-06-30

## 2022-06-30 NOTE — TELEPHONE ENCOUNTER
Patient would like a call back from the nurse or doctor regarding her medication AMITRIPTYLINE. She would like a lower dosage due to still being very drowsy  in the morning.     Please contact

## 2022-07-05 ENCOUNTER — TELEPHONE (OUTPATIENT)
Dept: NEUROLOGY | Age: 37
End: 2022-07-05

## 2022-07-06 ENCOUNTER — TELEPHONE (OUTPATIENT)
Dept: NEUROLOGY | Age: 37
End: 2022-07-06

## 2022-07-06 NOTE — TELEPHONE ENCOUNTER
Finally spoke with patient and she decided to cut the 150 mg of amitriptyline in half due to on and off drowsiness during the day, she state that has helped, she wanted to let you know.

## 2022-07-07 DIAGNOSIS — N18.6 ESRD ON HEMODIALYSIS (HCC): ICD-10-CM

## 2022-07-07 DIAGNOSIS — R10.2 PELVIC PAIN: ICD-10-CM

## 2022-07-07 DIAGNOSIS — Z99.2 ESRD ON HEMODIALYSIS (HCC): ICD-10-CM

## 2022-07-07 RX ORDER — OXYCODONE AND ACETAMINOPHEN 5; 325 MG/1; MG/1
1 TABLET ORAL
Qty: 20 TABLET | Refills: 0 | Status: SHIPPED | OUTPATIENT
Start: 2022-07-07 | End: 2022-07-18 | Stop reason: SDUPTHER

## 2022-07-07 NOTE — TELEPHONE ENCOUNTER
Pt  for refill     Requested Prescriptions     Pending Prescriptions Disp Refills    oxyCODONE-acetaminophen (PERCOCET) 5-325 mg per tablet 20 Tablet 0     Sig: Take 1 Tablet by mouth every eight (8) hours as needed for Pain for up to 14 days. Max Daily Amount: 3 Tablets.

## 2022-07-18 DIAGNOSIS — R10.2 PELVIC PAIN: ICD-10-CM

## 2022-07-18 DIAGNOSIS — Z99.2 ESRD ON HEMODIALYSIS (HCC): ICD-10-CM

## 2022-07-18 DIAGNOSIS — N18.6 ESRD ON HEMODIALYSIS (HCC): ICD-10-CM

## 2022-07-18 RX ORDER — OXYCODONE AND ACETAMINOPHEN 5; 325 MG/1; MG/1
1 TABLET ORAL
Qty: 20 TABLET | Refills: 0 | Status: SHIPPED | OUTPATIENT
Start: 2022-07-18 | End: 2022-07-26 | Stop reason: SDUPTHER

## 2022-07-18 NOTE — TELEPHONE ENCOUNTER
Pt calling to office for a refill    Requested Prescriptions     Pending Prescriptions Disp Refills    oxyCODONE-acetaminophen (PERCOCET) 5-325 mg per tablet 20 Tablet 0     Sig: Take 1 Tablet by mouth every eight (8) hours as needed for Pain for up to 14 days. Max Daily Amount: 3 Tablets.

## 2022-07-26 DIAGNOSIS — R10.2 PELVIC PAIN: ICD-10-CM

## 2022-07-26 DIAGNOSIS — N18.6 ESRD ON HEMODIALYSIS (HCC): ICD-10-CM

## 2022-07-26 DIAGNOSIS — Z99.2 ESRD ON HEMODIALYSIS (HCC): ICD-10-CM

## 2022-07-27 RX ORDER — OXYCODONE AND ACETAMINOPHEN 5; 325 MG/1; MG/1
1 TABLET ORAL
Qty: 20 TABLET | Refills: 0 | Status: SHIPPED | OUTPATIENT
Start: 2022-07-27 | End: 2022-08-08 | Stop reason: SDUPTHER

## 2022-08-08 DIAGNOSIS — R10.2 PELVIC PAIN: ICD-10-CM

## 2022-08-08 DIAGNOSIS — N18.6 ESRD ON HEMODIALYSIS (HCC): ICD-10-CM

## 2022-08-08 DIAGNOSIS — Z99.2 ESRD ON HEMODIALYSIS (HCC): ICD-10-CM

## 2022-08-08 RX ORDER — OXYCODONE AND ACETAMINOPHEN 5; 325 MG/1; MG/1
1 TABLET ORAL
Qty: 20 TABLET | Refills: 0 | Status: SHIPPED | OUTPATIENT
Start: 2022-08-08 | End: 2022-08-17 | Stop reason: SDUPTHER

## 2022-08-08 NOTE — TELEPHONE ENCOUNTER
Requested Prescriptions     Pending Prescriptions Disp Refills    oxyCODONE-acetaminophen (PERCOCET) 5-325 mg per tablet 20 Tablet 0     Sig: Take 1 Tablet by mouth every eight (8) hours as needed for Pain for up to 14 days. Max Daily Amount: 3 Tablets.

## 2022-08-09 ENCOUNTER — HOSPITAL ENCOUNTER (EMERGENCY)
Age: 37
Discharge: HOME | End: 2022-08-09
Attending: EMERGENCY MEDICINE | Admitting: EMERGENCY MEDICINE
Payer: MEDICARE

## 2022-08-09 VITALS
SYSTOLIC BLOOD PRESSURE: 118 MMHG | HEART RATE: 78 BPM | RESPIRATION RATE: 16 BRPM | WEIGHT: 209.22 LBS | TEMPERATURE: 97.6 F | HEIGHT: 65 IN | DIASTOLIC BLOOD PRESSURE: 77 MMHG | BODY MASS INDEX: 34.86 KG/M2 | OXYGEN SATURATION: 98 %

## 2022-08-09 DIAGNOSIS — N76.2 CELLULITIS OF LABIA: Primary | ICD-10-CM

## 2022-08-09 PROCEDURE — 99283 EMERGENCY DEPT VISIT LOW MDM: CPT | Performed by: EMERGENCY MEDICINE

## 2022-08-09 PROCEDURE — 74011250637 HC RX REV CODE- 250/637: Performed by: EMERGENCY MEDICINE

## 2022-08-09 RX ORDER — DOXYCYCLINE HYCLATE 100 MG
100 TABLET ORAL 2 TIMES DAILY
Qty: 14 TABLET | Refills: 0 | Status: SHIPPED | OUTPATIENT
Start: 2022-08-09 | End: 2022-08-16

## 2022-08-09 RX ORDER — HYDROCODONE BITARTRATE AND ACETAMINOPHEN 5; 325 MG/1; MG/1
1 TABLET ORAL
Qty: 11 TABLET | Refills: 0 | Status: SHIPPED | OUTPATIENT
Start: 2022-08-09 | End: 2022-08-12

## 2022-08-09 RX ORDER — DOXYCYCLINE HYCLATE 100 MG
100 TABLET ORAL
Status: COMPLETED | OUTPATIENT
Start: 2022-08-09 | End: 2022-08-09

## 2022-08-09 RX ORDER — HYDROCODONE BITARTRATE AND ACETAMINOPHEN 5; 325 MG/1; MG/1
1 TABLET ORAL
Status: COMPLETED | OUTPATIENT
Start: 2022-08-09 | End: 2022-08-09

## 2022-08-09 RX ADMIN — HYDROCODONE BITARTRATE AND ACETAMINOPHEN 1 TABLET: 5; 325 TABLET ORAL at 23:00

## 2022-08-09 RX ADMIN — DOXYCYCLINE HYCLATE 100 MG: 100 TABLET, COATED ORAL at 23:00

## 2022-08-10 NOTE — ED TRIAGE NOTES
Pt states she has a \"boil\" on the right side of her vagina. States it has been there for 2 days.   Has history of the same

## 2022-08-10 NOTE — ED PROVIDER NOTES
29-year-old female with history of anemia, chronic kidney disease, chronic pain, GERD, hyperkalemia, hypertension, history of Bartholin gland abscess presents with complaint of a boil to her labia which has been present and worsening for the past couple of days with pain. No vaginal discharge. No fever. No injury. The history is provided by the patient and medical records. Cyst   This is a new problem. The current episode started 2 days ago. The problem has been gradually worsening. The problem is associated with nothing. There has been no fever. The rash is present on the genitalia. The pain is moderate. The pain has been Constant since onset. She has tried nothing for the symptoms. Past Medical History:   Diagnosis Date    Anemia     secondary to lupus    Asthma     no inhaler use in past 2 to 3 years    Carditis     Chronic kidney disease     ESRD    Chronic pain     DDD (degenerative disc disease), lumbar     ESRD (end stage renal disease) ([address]     Fibroid tumor     GERD (gastroesophageal reflux disease)     HCAP (healthcare-associated pneumonia) 2019    Hemodialysis patient ([address] 2017    [address]  Tuesday,  Thursday,  and Saturday. Hypercholesterolemia     Hyperkalemia 3/4/2019    Hypertension     Hypokalemia 10/27/2017    Intra-abdominal abscess ([address] 2018    Intractable nausea and vomiting 10/21/2015    Long term (current) use of anticoagulants     Lupus (systemic lupus erythematosus) ([address]     Malignant hypertension with chronic kidney disease stage V ([address]     Peritoneal dialysis status ([address] 10/2015    x 2 years Stopped 2017 due to infection and removed.     Peritonitis ([address] 3/11/2018    Pneumonia 3/14/2020    Poor historian 2018    With medications    Sepsis ([address] 2018    Thromboembolus ([address] 2013    lungs    Transfusion history     Last Transfusion 2017  at [facility]       Past Surgical History:   Procedure Laterality Date    HX  SECTION  11/2006    x1    HX OTHER SURGICAL  9/16/15    INSERTION PD CATH; Removed 12/2017    HX VASCULAR ACCESS Right 12/2017    Double-Lumen henry catheter upper chest    HX VASCULAR ACCESS Right 2018    right upper arm         Family History:   Problem Relation Age of Onset    Diabetes Father     Hypertension Father     Cancer Other         aunt with breast cancerX2    Diabetes Mother        Social History     Socioeconomic History    Marital status: SINGLE     Spouse name: Not on file    Number of children: Not on file    Years of education: Not on file    Highest education level: Not on file   Occupational History    Not on file   Tobacco Use    Smoking status: Never    Smokeless tobacco: Never   Substance and Sexual Activity    Alcohol use: No    Drug use: Yes     Types: Prescription, OTC    Sexual activity: Not Currently   Other Topics Concern     Service No    Blood Transfusions No    Caffeine Concern No    Occupational Exposure No    Hobby Hazards No    Sleep Concern No    Stress Concern No    Weight Concern No    Special Diet No    Back Care Yes     Comment: low back pain    Exercise No    Bike Helmet No    Seat Belt Yes    Self-Exams No   Social History Narrative    Lives with parents and daughter. Social Determinants of Health     Financial Resource Strain: Not on file   Food Insecurity: Not on file   Transportation Needs: Not on file   Physical Activity: Not on file   Stress: Not on file   Social Connections: Not on file   Intimate Partner Violence: Not on file   Housing Stability: Not on file         ALLERGIES: Compazine [prochlorperazine edisylate]    Review of Systems   Constitutional:  Negative for fatigue and fever. HENT:  Negative for sneezing and sore throat. Respiratory:  Negative for cough and shortness of breath. Cardiovascular:  Negative for chest pain and leg swelling. Gastrointestinal:  Negative for abdominal pain, diarrhea, nausea and vomiting.    Genitourinary: Negative for difficulty urinating and dysuria. Musculoskeletal:  Negative for arthralgias and myalgias. Skin:  Negative for color change and rash. Neurological:  Negative for weakness and headaches. Psychiatric/Behavioral:  Negative for agitation and behavioral problems. Vitals:    08/09/22 2211   BP: 118/77   Pulse: 78   Resp: 16   Temp: 97.6 °F (36.4 °C)   SpO2: 98%   Weight: 94.9 kg (209 lb 3.5 oz)   Height: 5' 5\" (1.651 m)            Physical Exam  Vitals and nursing note reviewed. Exam conducted with a chaperone present. Constitutional:       General: She is not in acute distress. Appearance: Normal appearance. She is well-developed. She is obese. She is not ill-appearing, toxic-appearing or diaphoretic. HENT:      Head: Normocephalic and atraumatic. Nose: Nose normal.      Mouth/Throat:      Mouth: Mucous membranes are moist.      Pharynx: Oropharynx is clear. Eyes:      Extraocular Movements: Extraocular movements intact. Conjunctiva/sclera: Conjunctivae normal.      Pupils: Pupils are equal, round, and reactive to light. Cardiovascular:      Rate and Rhythm: Normal rate and regular rhythm. Pulses: Normal pulses. Heart sounds: Normal heart sounds. Pulmonary:      Effort: Pulmonary effort is normal. No respiratory distress. Breath sounds: Normal breath sounds. No wheezing. Chest:      Chest wall: No tenderness. Abdominal:      General: Abdomen is flat. There is no distension. Palpations: Abdomen is soft. Tenderness: There is no abdominal tenderness. There is no guarding or rebound. Genitourinary:     Comments: Diffuse swelling of the right posterior vaginal introitus without discrete abscess or mass  Musculoskeletal:         General: No swelling, tenderness, deformity or signs of injury. Normal range of motion. Cervical back: Normal range of motion and neck supple. No rigidity. No muscular tenderness. Right lower leg: No edema. Left lower leg: No edema. Skin:     General: Skin is warm and dry. Capillary Refill: Capillary refill takes less than 2 seconds. Neurological:      General: No focal deficit present. Mental Status: She is alert and oriented to person, place, and time. Psychiatric:         Mood and Affect: Mood normal.         Behavior: Behavior normal.        MDM  Number of Diagnoses or Management Options  Diagnosis management comments: 44-year-old female presents as above with labia pain. Exam does not demonstrate a Bartholin cyst abscess at this time. Given diffuse swelling and tenderness I suspect cellulitis. Plan to treat with pain medication, antibiotics, follow-up with gynecology, return if needed.            Procedures

## 2022-08-10 NOTE — ED NOTES
Pain assessment on discharge was   Condition Stable  Patient discharged to home  Patient education was completed  Education taught to patient  Teaching method used was handout and verbal  Understanding of teaching was good  Patient was discharged ambulatory  Discharged with self  Valuables were given to: patient remained in possession of belongings during stay.

## 2022-08-17 ENCOUNTER — TELEPHONE (OUTPATIENT)
Dept: GYNECOLOGY | Age: 37
End: 2022-08-17

## 2022-08-17 DIAGNOSIS — Z99.2 ESRD ON HEMODIALYSIS (HCC): ICD-10-CM

## 2022-08-17 DIAGNOSIS — N18.6 ESRD ON HEMODIALYSIS (HCC): ICD-10-CM

## 2022-08-17 DIAGNOSIS — R10.2 PELVIC PAIN: ICD-10-CM

## 2022-08-18 ENCOUNTER — TELEPHONE (OUTPATIENT)
Dept: GYNECOLOGY | Age: 37
End: 2022-08-18

## 2022-08-18 NOTE — TELEPHONE ENCOUNTER
The patient states she was seen in the er on 8/9/22 for a bartholins cyst and treated with antibiotics. She states it drained the next day and has resolved. She wanted to know if she needs to be seen by . She also wants to be sure her refill of pain medication is sent to her pharmacy. I advised the patient that if it has resolved there is no need to see . Advised to call if returns and that I will be sure  sends her rx in tomorrow.

## 2022-08-19 RX ORDER — OXYCODONE AND ACETAMINOPHEN 5; 325 MG/1; MG/1
1 TABLET ORAL
Qty: 20 TABLET | Refills: 0 | Status: SHIPPED | OUTPATIENT
Start: 2022-08-19 | End: 2022-08-26 | Stop reason: SDUPTHER

## 2022-08-26 DIAGNOSIS — Z99.2 ESRD ON HEMODIALYSIS (HCC): ICD-10-CM

## 2022-08-26 DIAGNOSIS — R10.2 PELVIC PAIN: ICD-10-CM

## 2022-08-26 DIAGNOSIS — N18.6 ESRD ON HEMODIALYSIS (HCC): ICD-10-CM

## 2022-08-26 RX ORDER — OXYCODONE AND ACETAMINOPHEN 5; 325 MG/1; MG/1
1 TABLET ORAL
Qty: 20 TABLET | Refills: 0 | Status: SHIPPED | OUTPATIENT
Start: 2022-08-26 | End: 2022-09-01 | Stop reason: SDUPTHER

## 2022-09-01 ENCOUNTER — TELEPHONE (OUTPATIENT)
Dept: GYNECOLOGY | Age: 37
End: 2022-09-01

## 2022-09-01 DIAGNOSIS — Z99.2 ESRD ON HEMODIALYSIS (HCC): ICD-10-CM

## 2022-09-01 DIAGNOSIS — N18.6 ESRD ON HEMODIALYSIS (HCC): ICD-10-CM

## 2022-09-01 DIAGNOSIS — R10.2 PELVIC PAIN: ICD-10-CM

## 2022-09-01 RX ORDER — OXYCODONE AND ACETAMINOPHEN 5; 325 MG/1; MG/1
1 TABLET ORAL
Qty: 20 TABLET | Refills: 0 | Status: SHIPPED | OUTPATIENT
Start: 2022-09-01 | End: 2022-09-09 | Stop reason: SDUPTHER

## 2022-09-04 ENCOUNTER — HOSPITAL ENCOUNTER (EMERGENCY)
Age: 37
Discharge: HOME | End: 2022-09-04
Attending: STUDENT IN AN ORGANIZED HEALTH CARE EDUCATION/TRAINING PROGRAM
Payer: MEDICARE

## 2022-09-04 ENCOUNTER — APPOINTMENT (OUTPATIENT)
Dept: GENERAL RADIOLOGY | Age: 37
End: 2022-09-04
Attending: STUDENT IN AN ORGANIZED HEALTH CARE EDUCATION/TRAINING PROGRAM
Payer: MEDICARE

## 2022-09-04 VITALS
RESPIRATION RATE: 18 BRPM | BODY MASS INDEX: 37.39 KG/M2 | TEMPERATURE: 99 F | DIASTOLIC BLOOD PRESSURE: 73 MMHG | SYSTOLIC BLOOD PRESSURE: 104 MMHG | HEIGHT: 65 IN | WEIGHT: 224.43 LBS | HEART RATE: 82 BPM | OXYGEN SATURATION: 94 %

## 2022-09-04 DIAGNOSIS — S86.811A STRAIN OF RIGHT PATELLAR TENDON, INITIAL ENCOUNTER: ICD-10-CM

## 2022-09-04 DIAGNOSIS — S83.411A SPRAIN OF MEDIAL COLLATERAL LIGAMENT OF RIGHT KNEE, INITIAL ENCOUNTER: Primary | ICD-10-CM

## 2022-09-04 PROCEDURE — 74011250637 HC RX REV CODE- 250/637: Performed by: STUDENT IN AN ORGANIZED HEALTH CARE EDUCATION/TRAINING PROGRAM

## 2022-09-04 PROCEDURE — 99283 EMERGENCY DEPT VISIT LOW MDM: CPT

## 2022-09-04 PROCEDURE — 73562 X-RAY EXAM OF KNEE 3: CPT

## 2022-09-04 RX ORDER — HYDROCODONE BITARTRATE AND ACETAMINOPHEN 5; 325 MG/1; MG/1
2 TABLET ORAL
Status: COMPLETED | OUTPATIENT
Start: 2022-09-04 | End: 2022-09-04

## 2022-09-04 RX ORDER — HYDROCODONE BITARTRATE AND ACETAMINOPHEN 5; 325 MG/1; MG/1
1 TABLET ORAL
Qty: 20 TABLET | Refills: 0 | Status: SHIPPED | OUTPATIENT
Start: 2022-09-04 | End: 2022-09-04

## 2022-09-04 RX ADMIN — HYDROCODONE BITARTRATE AND ACETAMINOPHEN 2 TABLET: 5; 325 TABLET ORAL at 13:04

## 2022-09-04 NOTE — ED PROVIDER NOTES
Date: 9/4/2022  Patient Name: Natailia Green    History of Presenting Illness     Chief Complaint   Patient presents with    Knee Pain       History Provided By: Patient    HPI: Nataliia Green, 40 y.o. female  presents to the ED with cc of right knee pain. Of note she is status post renal transplant March of this year. She follows mainly at [facility] at present, will return to her usual primary care team next year. She states that her dog pursue a door knocking her over 2 days ago, she has had constant pain in the right knee but has been able to ambulate. There has been localized swelling which is persistent. No pain proximal or distal to the knee. She not strike her head. Pain is moderate, achy and sharp when she moves it, primarily worse with palpation    There are no other complaints, changes, or physical findings at this time. PCP: Angus Amezquita NP    No current facility-administered medications on file prior to encounter. Current Outpatient Medications on File Prior to Encounter   Medication Sig Dispense Refill    amitriptyline (ELAVIL) 150 mg tablet Take 1 Tablet by mouth nightly for 90 days. Chronic insomnia. 90 Tablet 0    amLODIPine (NORVASC) 5 mg tablet Take 5 mg by mouth daily. tacrolimus (PROGRAF) 1 mg capsule Take 1 mg by mouth every twelve (12) hours. mycophenolate sodium (MYFORTIC) 360 mg delayed release tablet Take 180 mg by mouth two (2) times a day. famotidine (PEPCID) 40 mg tablet Take 40 mg by mouth daily. norethindrone acetate (AYGESTIN) 5 mg tablet TAKE 1 TABLET BY MOUTH DAILY 90 Tablet 5    carvedilol (COREG) 25 mg tablet Take 1 Tab by mouth two (2) times daily (with meals). 60 Tab 0    predniSONE (DELTASONE) 5 mg tablet Take 2 Tabs by mouth daily. 30 Tab 0    oxyCODONE-acetaminophen (PERCOCET) 5-325 mg per tablet Take 1 Tablet by mouth every eight (8) hours as needed for Pain for up to 14 days. Max Daily Amount: 3 Tablets.  20 Tablet 0    sennosides (Senna) 8.6 mg cap Take 2 Tablets by mouth. norethindrone acetate (AYGESTIN) 5 mg tablet TAKE 1 TABLET BY MOUTH DAILY 90 Tablet 3    ondansetron (Zofran ODT) 4 mg disintegrating tablet Take 1 Tab by mouth every eight (8) hours as needed for Nausea. 15 Tab 0       Past History     Past Medical History:  Past Medical History:   Diagnosis Date    Anemia     secondary to lupus    Asthma     no inhaler use in past 2 to 3 years    Carditis     Chronic kidney disease     ESRD    Chronic pain     DDD (degenerative disc disease), lumbar     ESRD (end stage renal disease) ([address]     Fibroid tumor     GERD (gastroesophageal reflux disease)     HCAP (healthcare-associated pneumonia) 2019    Hemodialysis patient ([address] 2017    [address]  Tuesday,  Thursday,  and Saturday. Hypercholesterolemia     Hyperkalemia 3/4/2019    Hypertension     Hypokalemia 10/27/2017    Intra-abdominal abscess ([address] 2018    Intractable nausea and vomiting 10/21/2015    Long term (current) use of anticoagulants     Lupus (systemic lupus erythematosus) ([address]     Malignant hypertension with chronic kidney disease stage V ([address]     Peritoneal dialysis status ([address] 10/2015    x 2 years Stopped 2017 due to infection and removed. Peritonitis ([address] 3/11/2018    Pneumonia 3/14/2020    Poor historian 2018    With medications    Sepsis ([address] 2018    Thromboembolus ([address] 2013    lungs    Transfusion history     Last Transfusion 2017  at [facility]       Past Surgical History:  Past Surgical History:   Procedure Laterality Date    HX  SECTION  11/2006    x1    HX OTHER SURGICAL  9/16/15    INSERTION PD CATH;  Removed 2017    HX VASCULAR ACCESS Right 2017    Double-Lumen henry catheter upper chest    HX VASCULAR ACCESS Right 2018    right upper arm       Family History:  Family History   Problem Relation Age of Onset    Diabetes Father     Hypertension Father     Cancer Other         aunt with breast cancerX2    Diabetes Mother        Social History:  Social History     Tobacco Use    Smoking status: Never    Smokeless tobacco: Never   Substance Use Topics    Alcohol use: No    Drug use: Yes     Types: Prescription, OTC       Allergies: Allergies   Allergen Reactions    Compazine [Prochlorperazine Edisylate] Nausea and Vomiting and Palpitations     \"hot and lightheaded\"         Review of Systems   Review of Systems   Constitutional:  Negative for chills and fever. Eyes:  Negative for photophobia. Respiratory:  Negative for shortness of breath. Cardiovascular:  Negative for chest pain. Gastrointestinal:  Negative for abdominal pain. Genitourinary:  Negative for dysuria. Musculoskeletal:  Positive for arthralgias. Negative for back pain. Neurological:  Negative for headaches. Psychiatric/Behavioral:  Negative for confusion. All other systems reviewed and are negative. Physical Exam   Physical Exam  Vitals and nursing note reviewed. Constitutional:       General: She is not in acute distress. HENT:      Head: Normocephalic and atraumatic. Mouth/Throat:      Mouth: Mucous membranes are moist.   Eyes:      Extraocular Movements: Extraocular movements intact. Cardiovascular:      Pulses: Normal pulses. Comments: Normal distal pulses  Pulmonary:      Effort: Pulmonary effort is normal.      Breath sounds: Normal breath sounds. Abdominal:      Palpations: Abdomen is soft. Tenderness: There is no abdominal tenderness. Musculoskeletal:      Right knee: Swelling (Over the MCL and patellar tendon) present. No crepitus. Tenderness present over the medial joint line, MCL and patellar tendon. No lateral joint line, LCL or ACL tenderness. No LCL laxity, MCL laxity, ACL laxity or PCL laxity. Normal alignment and normal patellar mobility. Right lower leg: No edema. Left lower leg: No edema. Right foot: Normal pulse. Left foot: Normal pulse.    Skin: General: Skin is warm and dry. Neurological:      General: No focal deficit present. Mental Status: She is alert. Mental status is at baseline. Motor: No weakness. Psychiatric:         Mood and Affect: Mood normal.       Diagnostic Study Results     Labs -  No results found for this or any previous visit (from the past 72 hour(s)). Radiologic Studies -   XR KNEE RT 3 V   Final Result   No acute fracture or dislocation. CT Results  (Last 48 hours)      None          CXR Results  (Last 48 hours)      None            Medical Decision Making   I am the first provider for this patient. I reviewed the vital signs, available nursing notes, past medical history, past surgical history, family history and social history. Vital Signs-Reviewed the patient's vital signs. Patient Vitals for the past 12 hrs:   Temp Pulse Resp BP SpO2   09/04/22 1315 -- 82 18 104/73 94 %   09/04/22 1157 -- -- -- -- 100 %   09/04/22 1145 99 °F (37.2 °C) 82 16 113/83 100 %         Records Reviewed: Nursing Notes and Old Medical Records    Provider Notes (Medical Decision Making):   Medial and patellar tendon ttp without instability, likely MCL injury, patellar tendon sprain. Will assess for fracture. Clinically not unstable. ED Course:   Initial assessment performed. The patients presenting problems have been discussed, and they are in agreement with the care plan formulated and outlined with them. I have encouraged them to ask questions as they arise throughout their visit. ED Course as of 09/04/22 2120   Sun Sep 04, 2022   1248 , No acute fracture, reviewed the findings with the patient, will obtain PT referral [NS]   1248   Patient is unable to take NSAIDs, rates her pain 8 out of 10, she had a recent kidney transplant [NS]      ED Course User Index  [NS] Fermin Goff MD             Disposition:      The patient's results have been reviewed with Her. She has been counseled regarding her diagnosis. She verbally conveys understanding and agreement of the signs, symptoms, diagnosis, treatment, and prognosis and additionally agrees to follow up as recommended with Dr. Emilie Owens NP in 24-48 hours. She also agrees with the care-plan and conveys that all of Her questions have been answered. I have also put together some discharge instructions for Her that include: 1) educational information regarding their diagnosis, 2) how to care for their diagnosis at home, as well a 3) list of reasons why they would want to return to the ED prior to their follow-up appointment, should their condition change. DISCHARGE PLAN:  1. Discharge Medication List as of 9/4/2022  1:32 PM        START taking these medications    Details   HYDROcodone-acetaminophen (Norco) 5-325 mg per tablet Take 1 Tablet by mouth every six (6) hours as needed for Pain for up to 5 days. Max Daily Amount: 4 Tablets., Normal, Disp-20 Tablet, R-0           CONTINUE these medications which have NOT CHANGED    Details   amitriptyline (ELAVIL) 150 mg tablet Take 1 Tablet by mouth nightly for 90 days. Chronic insomnia., Normal, Disp-90 Tablet, R-0      amLODIPine (NORVASC) 5 mg tablet Take 5 mg by mouth daily. , Historical Med      tacrolimus (PROGRAF) 1 mg capsule Take 1 mg by mouth every twelve (12) hours. , Historical Med      mycophenolate sodium (MYFORTIC) 360 mg delayed release tablet Take 180 mg by mouth two (2) times a day., Historical Med      famotidine (PEPCID) 40 mg tablet Take 40 mg by mouth daily. , Historical Med      !! norethindrone acetate (AYGESTIN) 5 mg tablet TAKE 1 TABLET BY MOUTH DAILY, Normal, Disp-90 Tablet, R-5**Patient requests 90 days supply**      carvedilol (COREG) 25 mg tablet Take 1 Tab by mouth two (2) times daily (with meals). , Print, Disp-60 Tab, R-0      predniSONE (DELTASONE) 5 mg tablet Take 2 Tabs by mouth daily. , No Print, Disp-30 Tab, R-0      oxyCODONE-acetaminophen (PERCOCET) 5-325 mg per tablet Take 1 Tablet by mouth every eight (8) hours as needed for Pain for up to 14 days. Max Daily Amount: 3 Tablets., Normal, Disp-20 Tablet, R-0      sennosides (Senna) 8.6 mg cap Take 2 Tablets by mouth., Historical Med      !! norethindrone acetate (AYGESTIN) 5 mg tablet TAKE 1 TABLET BY MOUTH DAILY, Normal, Disp-90 Tablet, R-3**Patient requests 90 days supply**      ondansetron (Zofran ODT) 4 mg disintegrating tablet Take 1 Tab by mouth every eight (8) hours as needed for Nausea. , Print, Disp-15 Tab, R-0       !! - Potential duplicate medications found. Please discuss with provider. 2.   Follow-up Information       Follow up With Specialties Details Why Contact Info    Nancy Vega NP Nurse Practitioner Schedule an appointment as soon as possible for a visit  Call for a follow up appointment. [address] [place] [address] Drive  [phone]      follow up for PT referral from your primary care team at u              3.  Return to ED if worse     Diagnosis     Clinical Impression:   1. Sprain of medial collateral ligament of right knee, initial encounter    2. Strain of right patellar tendon, initial encounter        Attestations:    Robin Benito MD    Please note that this dictation was completed with [employer], the computer voice recognition software. Quite often unanticipated grammatical, syntax, homophones, and other interpretive errors are inadvertently transcribed by the computer software. Please disregard these errors. Please excuse any errors that have escaped final proofreading. Thank you.

## 2022-09-04 NOTE — ED TRIAGE NOTES
Pt presents ambulatory. States that on Friday she was knocked over by her dog, and landed on her right side/knee. She felt like her right knee twisted when she fell. In triage, there is swelling noted to the right knee. Patient is not feeling pain, she is expecting the version to be painful.

## 2022-09-09 DIAGNOSIS — R10.2 PELVIC PAIN: ICD-10-CM

## 2022-09-09 DIAGNOSIS — N18.6 ESRD ON HEMODIALYSIS (HCC): ICD-10-CM

## 2022-09-09 DIAGNOSIS — Z99.2 ESRD ON HEMODIALYSIS (HCC): ICD-10-CM

## 2022-09-09 RX ORDER — OXYCODONE AND ACETAMINOPHEN 5; 325 MG/1; MG/1
1 TABLET ORAL
Qty: 20 TABLET | Refills: 0 | Status: SHIPPED | OUTPATIENT
Start: 2022-09-09 | End: 2022-09-20 | Stop reason: SDUPTHER

## 2022-09-09 NOTE — TELEPHONE ENCOUNTER
Pt calling for refill    Requested Prescriptions     Pending Prescriptions Disp Refills    oxyCODONE-acetaminophen (PERCOCET) 5-325 mg per tablet 20 Tablet 0     Sig: Take 1 Tablet by mouth every eight (8) hours as needed for Pain for up to 14 days. Max Daily Amount: 3 Tablets.

## 2022-09-20 DIAGNOSIS — R10.2 PELVIC PAIN: ICD-10-CM

## 2022-09-20 DIAGNOSIS — N18.6 ESRD ON HEMODIALYSIS (HCC): ICD-10-CM

## 2022-09-20 DIAGNOSIS — Z99.2 ESRD ON HEMODIALYSIS (HCC): ICD-10-CM

## 2022-09-20 NOTE — TELEPHONE ENCOUNTER
Pt requesting refill    Requested Prescriptions     Pending Prescriptions Disp Refills    oxyCODONE-acetaminophen (PERCOCET) 5-325 mg per tablet 20 Tablet 0     Sig: Take 1 Tablet by mouth every eight (8) hours as needed for Pain for up to 14 days. Max Daily Amount: 3 Tablets.

## 2022-09-21 RX ORDER — OXYCODONE AND ACETAMINOPHEN 5; 325 MG/1; MG/1
1 TABLET ORAL
Qty: 20 TABLET | Refills: 0 | Status: SHIPPED | OUTPATIENT
Start: 2022-09-21 | End: 2022-10-03 | Stop reason: SDUPTHER

## 2022-09-28 ENCOUNTER — OFFICE VISIT (OUTPATIENT)
Dept: NEUROLOGY | Age: 37
End: 2022-09-28
Payer: MEDICAID

## 2022-09-28 VITALS
OXYGEN SATURATION: 97 % | RESPIRATION RATE: 16 BRPM | DIASTOLIC BLOOD PRESSURE: 70 MMHG | HEART RATE: 80 BPM | SYSTOLIC BLOOD PRESSURE: 118 MMHG

## 2022-09-28 DIAGNOSIS — Z94.0 HISTORY OF RENAL TRANSPLANT: ICD-10-CM

## 2022-09-28 DIAGNOSIS — R56.9 SINGLE SEIZURE (HCC): ICD-10-CM

## 2022-09-28 DIAGNOSIS — G47.00 INSOMNIA, UNSPECIFIED TYPE: Primary | ICD-10-CM

## 2022-09-28 PROCEDURE — G8417 CALC BMI ABV UP PARAM F/U: HCPCS | Performed by: PSYCHIATRY & NEUROLOGY

## 2022-09-28 PROCEDURE — G9231 DOC ESRD DIA TRANS PREG: HCPCS | Performed by: PSYCHIATRY & NEUROLOGY

## 2022-09-28 PROCEDURE — 99214 OFFICE O/P EST MOD 30 MIN: CPT | Performed by: PSYCHIATRY & NEUROLOGY

## 2022-09-28 PROCEDURE — G8432 DEP SCR NOT DOC, RNG: HCPCS | Performed by: PSYCHIATRY & NEUROLOGY

## 2022-09-28 PROCEDURE — G8427 DOCREV CUR MEDS BY ELIG CLIN: HCPCS | Performed by: PSYCHIATRY & NEUROLOGY

## 2022-09-28 RX ORDER — AMITRIPTYLINE HYDROCHLORIDE 150 MG/1
150 TABLET, FILM COATED ORAL
COMMUNITY
End: 2022-09-28

## 2022-09-28 RX ORDER — AMITRIPTYLINE HYDROCHLORIDE 50 MG/1
TABLET, FILM COATED ORAL
Qty: 180 TABLET | Refills: 1 | Status: SHIPPED | OUTPATIENT
Start: 2022-09-28

## 2022-09-28 NOTE — PROGRESS NOTES
Cyril Abreu (1985) is a 40 y.o. female, established patient, here for evaluation of the following     Chief complaint(s):   Chief Complaint   Patient presents with    Follow-up     Patient presents today for a follow up with insomnia, patient would like a lower dose of the amitriptyline do she is not as tired in the am as she is trying to work part time. SUBJECTIVE/ OBJECTIVE:    HPI: 40 y.o. female ESRD/ HD    1) Has undergone renal transplant 3-22-22  Continues to report that things renal issues are stable since transplant    2) Chronic insomnia  Taking Amitriptyline 150 mg QHS. 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 We agreed I would reduce the Amitriptyline to 50 mg tablet and that on nights where she didn't have to babysit the next day, she could take one or two tablets to help her sleep. 3) Single seizure (oct 2019): none since; not on any anti-epileptic medication    ==========================    Brief Hx/ Prior Data:     Hx of Single Episode Seizure (Oct 2019)    EMR triggered me to look into seizure history. I reviewed EMR and discussed with patient. Per a Neurology Consult note at [facility] Dr Jose Ken, on 818-19: 41-year-old woman followed by [facility] neurology admitted here for possible seizure. She does not remember the event. Apparently she was confused afterwards. according to the medical record she had a convulsive event about 4 min long after dialysis today. Foaming of the mouth. No tongue biting or incontinence. she has no history of seizures she is aware of. She feels her baseline now completely. She is encountering significant stress due to the unexpected passing of her brother due to an overdose. EEG done on 10-19-19 showed mild encephalopathic pattern, non-specific, no epileptiform discharges were seen.   Brain MRI done during that admission was normal. Patient denies having any seizure-like episode prior to or after the one above in Oct 2019         Allergies   Allergen Reactions    Compazine [Prochlorperazine Edisylate] Nausea and Vomiting and Palpitations     \"hot and lightheaded\"         Current Outpatient Medications:     amitriptyline (ELAVIL) 50 mg tablet, Take one to two tablets at bedtime. For chronic insomnia. 90 day prescription. , Disp: 180 Tablet, Rfl: 1    amLODIPine (NORVASC) 5 mg tablet, Take 5 mg by mouth daily. , Disp: , Rfl:     tacrolimus (PROGRAF) 1 mg capsule, Take 1 mg by mouth every twelve (12) hours. , Disp: , Rfl:     mycophenolate sodium (MYFORTIC) 360 mg delayed release tablet, Take 180 mg by mouth two (2) times a day., Disp: , Rfl:     sennosides (Senna) 8.6 mg cap, Take 2 Tablets by mouth., Disp: , Rfl:     famotidine (PEPCID) 40 mg tablet, Take 40 mg by mouth daily. , Disp: , Rfl:     norethindrone acetate (AYGESTIN) 5 mg tablet, TAKE 1 TABLET BY MOUTH DAILY, Disp: 90 Tablet, Rfl: 5    norethindrone acetate (AYGESTIN) 5 mg tablet, TAKE 1 TABLET BY MOUTH DAILY, Disp: 90 Tablet, Rfl: 3    ondansetron (Zofran ODT) 4 mg disintegrating tablet, Take 1 Tab by mouth every eight (8) hours as needed for Nausea., Disp: 15 Tab, Rfl: 0    carvedilol (COREG) 25 mg tablet, Take 1 Tab by mouth two (2) times daily (with meals). , Disp: 60 Tab, Rfl: 0    predniSONE (DELTASONE) 5 mg tablet, Take 2 Tabs by mouth daily. , Disp: 30 Tab, Rfl: 0    oxyCODONE-acetaminophen (PERCOCET) 5-325 mg per tablet, Take 1 Tablet by mouth every eight (8) hours as needed for Pain for up to 14 days. Max Daily Amount: 3 Tablets.  (Patient not taking: Reported on 9/28/2022), Disp: 20 Tablet, Rfl: 0     has a past medical history of Anemia, Asthma, Carditis, Chronic kidney disease, Chronic pain, DDD (degenerative disc disease), lumbar, ESRD (end stage renal disease) ([facility] Fibroid tumor, GERD (gastroesophageal reflux disease), HCAP (healthcare-associated pneumonia) (2019), [facility]od[facility] (2017), Hypercholesterolemia, Hyperkalemia (3/4/2019), Hypertension, Hypokalemia (10/27/2017), Intra-abdominal abscess ([facility] (2018), Intractable nausea and vomiting (10/21/2015), Long term (current) use of anticoagulants, Lupus (systemic lupus erythematosus) ([facility] Malignant hypertension with chronic kidney disease stage V [facility]), Peritoneal dialysis status ([facility] (10/2015), Peritonitis ([facility] (3/11/2018), Pneumonia (3/14/2020), Poor historian (2018), Sepsis ([facility] (2018), Thromboembolus ([facility] (), and Transfusion history. She has no past medical history of Adverse effect of anesthesia, Difficult intubation, Malignant hyperthermia due to anesthesia, or Pseudocholinesterase deficiency. has a past surgical history that includes hx  section (2006); hx other surgical (9/16/15); hx vascular access (Right, 2017); and hx vascular access (Right, ). Physical Exam:    Vitals:    22 0916   BP: 118/70   Pulse: 80   Resp: 16   SpO2: 97%     No formal exam performed  Patient is awake alert oriented x3     ========================================    ASSESSMENT/ PLAN:       ICD-10-CM ICD-9-CM    1. Insomnia, unspecified type  G47.00 780.52 amitriptyline (ELAVIL) 50 mg tablet      2. Single seizure ([facility]  R56.9 780.39       3. History of renal transplant  Z94.0 V42.0              1) Insomnia  Reduce amitriptyline to 50 mg tablets with instructions on how to take (see HPI)    2) Hx of single seizure in Oct 2019  Cause unclear. None before or after. No indication for patient to be on antiepileptic medication    3) Hx of renal transplant  Followed by Nephrology, Transplant Specialist    4) F/u in 6 months      An electronic signature was used to authenticate this note.   -- Melquiades Reynolds MD

## 2022-09-29 ENCOUNTER — APPOINTMENT (OUTPATIENT)
Dept: ULTRASOUND IMAGING | Age: 37
End: 2022-09-29
Attending: STUDENT IN AN ORGANIZED HEALTH CARE EDUCATION/TRAINING PROGRAM
Payer: MEDICARE

## 2022-09-29 ENCOUNTER — HOSPITAL ENCOUNTER (EMERGENCY)
Age: 37
Discharge: HOME | End: 2022-09-30
Attending: STUDENT IN AN ORGANIZED HEALTH CARE EDUCATION/TRAINING PROGRAM
Payer: MEDICARE

## 2022-09-29 ENCOUNTER — APPOINTMENT (OUTPATIENT)
Dept: GENERAL RADIOLOGY | Age: 37
End: 2022-09-29
Attending: STUDENT IN AN ORGANIZED HEALTH CARE EDUCATION/TRAINING PROGRAM
Payer: MEDICARE

## 2022-09-29 DIAGNOSIS — K21.9 GASTROESOPHAGEAL REFLUX DISEASE, UNSPECIFIED WHETHER ESOPHAGITIS PRESENT: Primary | ICD-10-CM

## 2022-09-29 DIAGNOSIS — K82.8 GALLBLADDER SLUDGE: ICD-10-CM

## 2022-09-29 DIAGNOSIS — R07.89 CHEST DISCOMFORT: ICD-10-CM

## 2022-09-29 LAB
ALBUMIN SERPL-MCNC: 3.8 G/DL (ref 3.5–5)
ALBUMIN/GLOB SERPL: 0.9 {RATIO} (ref 1.1–2.2)
ALP SERPL-CCNC: 105 U/L (ref 45–117)
ALT SERPL-CCNC: 48 U/L (ref 12–78)
ANION GAP SERPL CALC-SCNC: 13 MMOL/L (ref 5–15)
AST SERPL-CCNC: 65 U/L (ref 15–37)
BASOPHILS # BLD: 0 K/UL (ref 0–0.1)
BASOPHILS NFR BLD: 1 % (ref 0–1)
BILIRUB SERPL-MCNC: 0.5 MG/DL (ref 0.2–1)
BUN SERPL-MCNC: 19 MG/DL (ref 6–20)
BUN/CREAT SERPL: 20 (ref 12–20)
CALCIUM SERPL-MCNC: 9.6 MG/DL (ref 8.5–10.1)
CHLORIDE SERPL-SCNC: 101 MMOL/L (ref 97–108)
CO2 SERPL-SCNC: 20 MMOL/L (ref 21–32)
CREAT SERPL-MCNC: 0.95 MG/DL (ref 0.55–1.02)
DIFFERENTIAL METHOD BLD: ABNORMAL
EOSINOPHIL # BLD: 0 K/UL (ref 0–0.4)
EOSINOPHIL NFR BLD: 1 % (ref 0–7)
ERYTHROCYTE [DISTWIDTH] IN BLOOD BY AUTOMATED COUNT: 13 % (ref 11.5–14.5)
GLOBULIN SER CALC-MCNC: 4.2 G/DL (ref 2–4)
GLUCOSE SERPL-MCNC: 114 MG/DL (ref 65–100)
HCT VFR BLD AUTO: 40.5 % (ref 35–47)
HGB BLD-MCNC: 13.1 G/DL (ref 11.5–16)
IMM GRANULOCYTES # BLD AUTO: 0 K/UL (ref 0–0.04)
IMM GRANULOCYTES NFR BLD AUTO: 1 % (ref 0–0.5)
LYMPHOCYTES # BLD: 0.4 K/UL (ref 0.8–3.5)
LYMPHOCYTES NFR BLD: 10 % (ref 12–49)
MCH RBC QN AUTO: 28.4 PG (ref 26–34)
MCHC RBC AUTO-ENTMCNC: 32.3 G/DL (ref 30–36.5)
MCV RBC AUTO: 87.9 FL (ref 80–99)
MONOCYTES # BLD: 0.4 K/UL (ref 0–1)
MONOCYTES NFR BLD: 10 % (ref 5–13)
NEUTS SEG # BLD: 3.3 K/UL (ref 1.8–8)
NEUTS SEG NFR BLD: 78 % (ref 32–75)
NRBC # BLD: 0 K/UL (ref 0–0.01)
NRBC BLD-RTO: 0 PER 100 WBC
PLATELET # BLD AUTO: 171 K/UL (ref 150–400)
PMV BLD AUTO: 11.1 FL (ref 8.9–12.9)
POTASSIUM SERPL-SCNC: 4.3 MMOL/L (ref 3.5–5.1)
PROT SERPL-MCNC: 8 G/DL (ref 6.4–8.2)
RBC # BLD AUTO: 4.61 M/UL (ref 3.8–5.2)
SODIUM SERPL-SCNC: 134 MMOL/L (ref 136–145)
TROPONIN-HIGH SENSITIVITY: 8 NG/L (ref 0–51)
WBC # BLD AUTO: 4.3 K/UL (ref 3.6–11)

## 2022-09-29 PROCEDURE — 99285 EMERGENCY DEPT VISIT HI MDM: CPT

## 2022-09-29 PROCEDURE — 74011250636 HC RX REV CODE- 250/636: Performed by: STUDENT IN AN ORGANIZED HEALTH CARE EDUCATION/TRAINING PROGRAM

## 2022-09-29 PROCEDURE — 71046 X-RAY EXAM CHEST 2 VIEWS: CPT

## 2022-09-29 PROCEDURE — 96374 THER/PROPH/DIAG INJ IV PUSH: CPT

## 2022-09-29 PROCEDURE — 80053 COMPREHEN METABOLIC PANEL: CPT

## 2022-09-29 PROCEDURE — 93005 ELECTROCARDIOGRAM TRACING: CPT

## 2022-09-29 PROCEDURE — 85025 COMPLETE CBC W/AUTO DIFF WBC: CPT

## 2022-09-29 PROCEDURE — 96375 TX/PRO/DX INJ NEW DRUG ADDON: CPT

## 2022-09-29 PROCEDURE — 36415 COLL VENOUS BLD VENIPUNCTURE: CPT

## 2022-09-29 PROCEDURE — 76705 ECHO EXAM OF ABDOMEN: CPT

## 2022-09-29 PROCEDURE — 84484 ASSAY OF TROPONIN QUANT: CPT

## 2022-09-29 PROCEDURE — 74011250637 HC RX REV CODE- 250/637: Performed by: STUDENT IN AN ORGANIZED HEALTH CARE EDUCATION/TRAINING PROGRAM

## 2022-09-29 PROCEDURE — 74011000250 HC RX REV CODE- 250: Performed by: STUDENT IN AN ORGANIZED HEALTH CARE EDUCATION/TRAINING PROGRAM

## 2022-09-29 RX ORDER — ONDANSETRON 2 MG/ML
4 INJECTION INTRAMUSCULAR; INTRAVENOUS ONCE
Status: COMPLETED | OUTPATIENT
Start: 2022-09-29 | End: 2022-09-29

## 2022-09-29 RX ADMIN — FAMOTIDINE 20 MG: 10 INJECTION, SOLUTION INTRAVENOUS at 21:17

## 2022-09-29 RX ADMIN — ALUMINUM HYDROXIDE AND MAGNESIUM HYDROXIDE 30 ML: 200; 200 SUSPENSION ORAL at 21:41

## 2022-09-29 RX ADMIN — ONDANSETRON HYDROCHLORIDE 4 MG: 2 INJECTION, SOLUTION INTRAMUSCULAR; INTRAVENOUS at 22:00

## 2022-09-30 VITALS
RESPIRATION RATE: 21 BRPM | DIASTOLIC BLOOD PRESSURE: 90 MMHG | BODY MASS INDEX: 35.92 KG/M2 | TEMPERATURE: 97.8 F | HEART RATE: 78 BPM | HEIGHT: 65 IN | WEIGHT: 215.61 LBS | OXYGEN SATURATION: 100 % | SYSTOLIC BLOOD PRESSURE: 138 MMHG

## 2022-09-30 LAB
ATRIAL RATE: 75 BPM
CALCULATED P AXIS, ECG09: 47 DEGREES
CALCULATED R AXIS, ECG10: 51 DEGREES
CALCULATED T AXIS, ECG11: 43 DEGREES
DIAGNOSIS, 93000: NORMAL
P-R INTERVAL, ECG05: 140 MS
Q-T INTERVAL, ECG07: 380 MS
QRS DURATION, ECG06: 88 MS
QTC CALCULATION (BEZET), ECG08: 424 MS
TROPONIN-HIGH SENSITIVITY: 9 NG/L (ref 0–51)
VENTRICULAR RATE, ECG03: 75 BPM

## 2022-09-30 PROCEDURE — 84484 ASSAY OF TROPONIN QUANT: CPT

## 2022-09-30 PROCEDURE — 36415 COLL VENOUS BLD VENIPUNCTURE: CPT

## 2022-09-30 RX ORDER — SUCRALFATE 1 G/1
1 TABLET ORAL 4 TIMES DAILY
Qty: 40 TABLET | Refills: 0 | Status: SHIPPED | OUTPATIENT
Start: 2022-09-30

## 2022-09-30 NOTE — ED PROVIDER NOTES
HPI     Date of Service:  9/29/2022    Patient:  Barney Barahona    Chief Complaint:  Chest Pain       HPI:  Barney Barahona is a 40 y.o.  female with a past medical history of lupus with ESRD s/p kidney transplant, HTN who presents for evaluation of chest pain. Patient reports dull substernal chest discomfort since 5pm today. States symptoms are worsened with movement, no other exacerbating factors. No longer alleviating factors but has not tried any medications at home for her symptoms. She endorses associated nausea and nonbilious, nonbloody emesis. No associate abdominal pain. No associated shortness of breath, radiation of pain, diaphoresis, syncope. No other recent illness including fevers, chills, cough or congestion. Past Medical History:   Diagnosis Date    Anemia     secondary to lupus    Asthma     no inhaler use in past 2 to 3 years    Carditis     Chronic kidney disease     ESRD    Chronic pain     DDD (degenerative disc disease), lumbar     ESRD (end stage renal disease) ([address]     Fibroid tumor     GERD (gastroesophageal reflux disease)     HCAP (healthcare-associated pneumonia) 7/16/2019    Hemodialysis patient ([address] 12/21/2017    [address]  Tuesday,  Thursday,  and Saturday. Hypercholesterolemia     Hyperkalemia 3/4/2019    Hypertension     Hypokalemia 10/27/2017    Intra-abdominal abscess ([address] 5/12/2018    Intractable nausea and vomiting 10/21/2015    Long term (current) use of anticoagulants     Lupus (systemic lupus erythematosus) ([address]     Malignant hypertension with chronic kidney disease stage V ([address]     Peritoneal dialysis status ([address] 10/2015    x 2 years Stopped 12/2017 due to infection and removed.     Peritonitis ([address] 3/11/2018    Pneumonia 3/14/2020    Poor historian 01/17/2018    With medications    Sepsis ([address] 4/29/2018    Thromboembolus ([address] 2013    lungs    Transfusion history     Last Transfusion 12/21/2017  at [facility]       Past Surgical History:   Procedure Laterality Date    HX  SECTION  11/2006    x1    HX OTHER SURGICAL  2015    INSERTION PD CATH; Removed 2017    HX RENAL TRANSPLANT  2022    HX VASCULAR ACCESS Right 2017    Double-Lumen henry catheter upper chest    HX VASCULAR ACCESS Right 2018    right upper arm         Family History:   Problem Relation Age of Onset    Diabetes Father     Hypertension Father     Cancer Other         aunt with breast cancerX2    Diabetes Mother        Social History     Socioeconomic History    Marital status: SINGLE     Spouse name: Not on file    Number of children: Not on file    Years of education: Not on file    Highest education level: Not on file   Occupational History    Not on file   Tobacco Use    Smoking status: Never    Smokeless tobacco: Never   Substance and Sexual Activity    Alcohol use: No    Drug use: Yes     Types: Prescription, OTC    Sexual activity: Not Currently   Other Topics Concern     Service No    Blood Transfusions No    Caffeine Concern No    Occupational Exposure No    Hobby Hazards No    Sleep Concern No    Stress Concern No    Weight Concern No    Special Diet No    Back Care Yes     Comment: low back pain    Exercise No    Bike Helmet No    Seat Belt Yes    Self-Exams No   Social History Narrative    Lives with parents and daughter. Social Determinants of Health     Financial Resource Strain: Not on file   Food Insecurity: Not on file   Transportation Needs: Not on file   Physical Activity: Not on file   Stress: Not on file   Social Connections: Not on file   Intimate Partner Violence: Not on file   Housing Stability: Not on file         ALLERGIES: Compazine [prochlorperazine edisylate]    Review of Systems   Constitutional:  Negative for chills and fever. HENT:  Negative for congestion and rhinorrhea. Eyes:  Negative for discharge and redness. Respiratory:  Negative for cough and shortness of breath.     Cardiovascular:  Negative for chest pain and leg swelling. Gastrointestinal:  Positive for nausea and vomiting. Negative for abdominal pain and diarrhea. Genitourinary:  Negative for dysuria and hematuria. Musculoskeletal:  Negative for back pain and neck pain. Skin:  Negative for rash and wound. Neurological:  Negative for speech difficulty and light-headedness. Psychiatric/Behavioral:  Negative for agitation and confusion. Vitals:    09/29/22 2049 09/29/22 2101   BP: (!) 133/98 (!) 138/90   Pulse: 75 80   Resp: 24 14   Temp: 97.8 °F (36.6 °C)    SpO2: 100% 100%   Weight: 97.8 kg (215 lb 9.8 oz)    Height: 5' 5\" (1.651 m)             Physical Exam  Vitals and nursing note reviewed. Constitutional:       General: She is not in acute distress. Appearance: Normal appearance. She is obese. She is not ill-appearing or toxic-appearing. HENT:      Head: Normocephalic. Eyes:      Extraocular Movements: Extraocular movements intact. Conjunctiva/sclera: Conjunctivae normal.   Cardiovascular:      Rate and Rhythm: Normal rate and regular rhythm. Pulses: Normal pulses. Radial pulses are 2+ on the right side and 2+ on the left side. Heart sounds: Normal heart sounds. Pulmonary:      Effort: Pulmonary effort is normal. No respiratory distress. Breath sounds: Normal breath sounds. Abdominal:      General: Abdomen is protuberant. Palpations: Abdomen is soft. Tenderness: There is no abdominal tenderness. Musculoskeletal:         General: Normal range of motion. Right lower leg: No edema. Left lower leg: No edema. Skin:     General: Skin is warm and dry. Capillary Refill: Capillary refill takes less than 2 seconds. Neurological:      General: No focal deficit present. Mental Status: She is alert and oriented to person, place, and time.    Psychiatric:         Mood and Affect: Mood normal.         Behavior: Behavior normal.        MDM  Number of Diagnoses or Management Options  Chest discomfort  Gallbladder sludge  Gastroesophageal reflux disease, unspecified whether esophagitis present  Diagnosis management comments:     DECISION MAKING:  Drew Blackman is a 40 y.o. female who comes in as above. On arrival to the emergency department patient is afebrile. Vital signs notable for elevated blood pressure at 133/98, otherwise stable. On my examination, patient is resting comfortably and in no acute distress. Her exam is unremarkable. EKG on arrival is negative for any acute ischemic changes. Differential diagnosis includes, but not limited to GERD, gastritis, ACS, cholelithiasis, cholecystitis. IV Zofran and Pepcid, oral Maalox ordered for symptomatic relief. CBC is without leukocytosis or anemia. Electrolytes are stable. BUN and creatinine within normal limits. ALT is normal, AST mildly elevated at 65. High-sensitivity troponin within normal limits and no significant change at repeat testing. Chest x-ray negative for any acute cardiopulmonary process including pneumonia. Ultrasound of the gallbladder shows biliary sludge without any signs of cholelithiasis or cholecystitis. On my reevaluation after medications, she is feeling much improved. She is no longer vomiting. Results and follow-up needs were discussed with patient and family. They verbalized understanding and she was discharged. Amount and/or Complexity of Data Reviewed  Clinical lab tests: reviewed  Tests in the radiology section of CPT®: reviewed      ED Course as of 09/30/22 0208   [facility] Sep 30, 2022   0031 EKG, 12 LEAD, INITIAL  Normal sinus rhythm, rate 75 bpm.  Normal axis. Normal intervals. No ST elevations.  [SH]      ED Course User Index  [SH] Kala Thompson,        Procedures      LABS:  Recent Results (from the past 6 hour(s))   CBC WITH AUTOMATED DIFF    Collection Time: 09/29/22  9:01 PM   Result Value Ref Range    WBC 4.3 3.6 - 11.0 K/uL    RBC 4.61 3.80 - 5.20 M/uL    HGB 13.1 11.5 - 16.0 g/dL    HCT 40.5 35.0 - 47.0 %    MCV 87.9 80.0 - 99.0 FL    MCH 28.4 26.0 - 34.0 PG    MCHC 32.3 30.0 - 36.5 g/dL    RDW 13.0 11.5 - 14.5 %    PLATELET 709 670 - 366 K/uL    MPV 11.1 8.9 - 12.9 FL    NRBC 0.0 0  WBC    ABSOLUTE NRBC 0.00 0.00 - 0.01 K/uL    NEUTROPHILS 78 (H) 32 - 75 %    LYMPHOCYTES 10 (L) 12 - 49 %    MONOCYTES 10 5 - 13 %    EOSINOPHILS 1 0 - 7 %    BASOPHILS 1 0 - 1 %    IMMATURE GRANULOCYTES 1 (H) 0.0 - 0.5 %    ABS. NEUTROPHILS 3.3 1.8 - 8.0 K/UL    ABS. LYMPHOCYTES 0.4 (L) 0.8 - 3.5 K/UL    ABS. MONOCYTES 0.4 0.0 - 1.0 K/UL    ABS. EOSINOPHILS 0.0 0.0 - 0.4 K/UL    ABS. BASOPHILS 0.0 0.0 - 0.1 K/UL    ABS. IMM. GRANS. 0.0 0.00 - 0.04 K/UL    DF AUTOMATED     METABOLIC PANEL, COMPREHENSIVE    Collection Time: 09/29/22  9:01 PM   Result Value Ref Range    Sodium 134 (L) 136 - 145 mmol/L    Potassium 4.3 3.5 - 5.1 mmol/L    Chloride 101 97 - 108 mmol/L    CO2 20 (L) 21 - 32 mmol/L    Anion gap 13 5 - 15 mmol/L    Glucose 114 (H) 65 - 100 mg/dL    BUN 19 6 - 20 MG/DL    Creatinine 0.95 0.55 - 1.02 MG/DL    BUN/Creatinine ratio 20 12 - 20      GFR est AA >60 >60 ml/min/1.73m2    GFR est non-AA >60 >60 ml/min/1.73m2    Calcium 9.6 8.5 - 10.1 MG/DL    Bilirubin, total 0.5 0.2 - 1.0 MG/DL    ALT (SGPT) 48 12 - 78 U/L    AST (SGOT) 65 (H) 15 - 37 U/L    Alk. phosphatase 105 45 - 117 U/L    Protein, total 8.0 6.4 - 8.2 g/dL    Albumin 3.8 3.5 - 5.0 g/dL    Globulin 4.2 (H) 2.0 - 4.0 g/dL    A-G Ratio 0.9 (L) 1.1 - 2.2     TROPONIN-HIGH SENSITIVITY    Collection Time: 09/29/22  9:01 PM   Result Value Ref Range    Troponin-High Sensitivity 8 0 - 51 ng/L   TROPONIN-HIGH SENSITIVITY    Collection Time: 09/30/22 12:07 AM   Result Value Ref Range    Troponin-High Sensitivity 9 0 - 51 ng/L        IMAGING:  US ABD LTD   Final Result   Biliary sludge with tenderness in the region of the gallbladder.             XR CHEST PA LAT   Final Result   No acute process Medications During Visit:  Medications   ondansetron (ZOFRAN) injection 4 mg (4 mg IntraVENous Given 9/29/22 2200)   famotidine (PF) (PEPCID) 20 mg in 0.9% sodium chloride 10 mL injection (20 mg IntraVENous Given 9/29/22 2117)   aluminum-magnesium hydroxide (MAALOX) oral suspension 30 mL (30 mL Oral Given 9/29/22 2141)         IMPRESSION:  1. Gastroesophageal reflux disease, unspecified whether esophagitis present    2. Chest discomfort    3. Gallbladder sludge        DISPOSITION:        Current Discharge Medication List        START taking these medications    Details   sucralfate (CARAFATE) 1 gram tablet Take 1 Tablet by mouth four (4) times daily. Qty: 40 Tablet, Refills: 0  Start date: 9/30/2022              Follow-up Information       Follow up With Specialties Details Why Contact Info    Sharon Denis NP Nurse Practitioner Schedule an appointment as soon as possible for a visit   [address] [address]  [address] [facility] [id number]                The patient is asked to follow-up with their primary care provider in the next several days. They are to call tomorrow for an appointment. The patient is asked to return promptly for any increased concerns or worsening of symptoms. They can return to this emergency department or any other emergency department.     Wei Keyes DO

## 2022-09-30 NOTE — DISCHARGE INSTRUCTIONS
Please follow-up with your primary care doctor for ER follow-up visit and reassessment of your symptoms. Continue taking your antacid as prescribed. Return to the emergency department if your symptoms return or worsen, if you develop difficulty breathing, vomiting and cannot keep fluids down or any other concerns.

## 2022-09-30 NOTE — ED TRIAGE NOTES
Pt reports mid chest pain, vomiting since about 5pm. Denies any other symptoms. Pt self ambulatory, no acute distressed noted. Normal respiratory effort.

## 2022-10-03 DIAGNOSIS — N18.6 ESRD ON HEMODIALYSIS (HCC): ICD-10-CM

## 2022-10-03 DIAGNOSIS — Z99.2 ESRD ON HEMODIALYSIS (HCC): ICD-10-CM

## 2022-10-03 DIAGNOSIS — R10.2 PELVIC PAIN: ICD-10-CM

## 2022-10-03 NOTE — TELEPHONE ENCOUNTER
Pt calling to office for a refill    Requested Prescriptions     Pending Prescriptions Disp Refills    oxyCODONE-acetaminophen (PERCOCET) 5-325 mg per tablet 20 Tablet 0     Sig: Take 1 Tablet by mouth every eight (8) hours as needed for Pain for up to 14 days. Max Daily Amount: 3 Tablets.

## 2022-10-04 RX ORDER — OXYCODONE AND ACETAMINOPHEN 5; 325 MG/1; MG/1
1 TABLET ORAL
Qty: 20 TABLET | Refills: 0 | Status: SHIPPED | OUTPATIENT
Start: 2022-10-04 | End: 2022-10-17 | Stop reason: SDUPTHER

## 2022-10-17 DIAGNOSIS — Z99.2 ESRD ON HEMODIALYSIS (HCC): ICD-10-CM

## 2022-10-17 DIAGNOSIS — N18.6 ESRD ON HEMODIALYSIS (HCC): ICD-10-CM

## 2022-10-17 DIAGNOSIS — R10.2 PELVIC PAIN: ICD-10-CM

## 2022-10-17 NOTE — TELEPHONE ENCOUNTER
Pt requesting a refill on her percocet    Requested Prescriptions     Pending Prescriptions Disp Refills    oxyCODONE-acetaminophen (PERCOCET) 5-325 mg per tablet 20 Tablet 0     Sig: Take 1 Tablet by mouth every eight (8) hours as needed for Pain for up to 14 days. Max Daily Amount: 3 Tablets.

## 2022-10-18 RX ORDER — OXYCODONE AND ACETAMINOPHEN 5; 325 MG/1; MG/1
1 TABLET ORAL
Qty: 20 TABLET | Refills: 0 | Status: SHIPPED | OUTPATIENT
Start: 2022-10-18 | End: 2022-10-31 | Stop reason: SDUPTHER

## 2022-10-25 NOTE — DISCHARGE SUMMARY
Discharge Summary       PATIENT ID: Barbara Hernández  MRN: [id number]   YOB: 1985    DATE OF ADMISSION: 3/19/2022  8:53 PM    DATE OF DISCHARGE: 3/20/22   PRIMARY CARE PROVIDER: Adalberto Segovia NP     ATTENDING PHYSICIAN: Anna Romeo MD  DISCHARGING PROVIDER: Anna Romeo MD    To contact this individual call [phone] and ask the  to page. If unavailable ask to be transferred the Adult Hospitalist Department. CONSULTATIONS: IP CONSULT TO HOSPITALIST    PROCEDURES/SURGERIES: * No surgery found *    ADMITTING [address] [place] [address] COURSE:   Barbara Hernández is a 40 y.o. female  with extensive past medical history of anemia, asthma, carditis, end-stage renal disease on hemodialysis Monday Wednesday and Friday, hypercholesterolemia, hypertension, hypokalemia, PE, among other things who is [facility] for fever and acute pulmonary edema. The patient reports that she has been having on and off fever, runny nose, body aches for about a week and a half. She denies any shortness of breath, chest pain, palpitations, sputum production, cough, headache, nausea, vomiting, diarrhea, or other symptoms. She reports that she was just not feeling well went to the ER. In the ER her x-ray showed slight developing pulmonary edema but she denies any shortness of breath and says that she had her last dialysis session on Friday and is on schedule. She reports feeling fine at this point. Patient is on no oxygen and appears comfortable. At this time the patient just reports some back pain and body aches but no other major complaints. She says that she has been compliant with her medication and up-to-date on her dialysis.     Chest x-ray in the ER showed mild interstitial prominence with possible developing interstitial edema. Other lab tests were essentially normal for what her baseline is. Influenza AMB have been negative.     Respiratory failure ([address] (3/20/2022)  Patient is on room air and oxygen saturation is normal.  Monitor  We will give supplemental oxygen if needed     Pulmonary edema  Mild  No signs of pulmonary/respiratory distress due to this  Likely due to end-stage renal disease with patient is up-to-date on her dialysis  Monitor     Rhinorrhea and body aches  Likely viral infection  Acetaminophen for pain and fever     Back pain  Acetaminophen     End-stage renal disease on hemodialysis Monday, Wednesday, Friday  Last hemodialysis Friday  No need to consult nephrology at this point as the patient does not need urgent dialysis. If the patient is to stay until Monday and I will consult nephrology for dialysis     Hypertension  Restarting amlodipine that the patient says she is on as outpatient  Reconciling medications per pharmacy to restart other medications  Hydralazine for systolic blood pressure above 219 or diastolic blood pressure above 100     Lupus  We will restart home medications once reconciled    Hospital course  Patient arrived to the hospital and was on room air. Patient complained of some muscle aches and back pain but remained stable throughout the hospital stay. I examined the patient and saw her and saw that her chest x-ray and labs look about the same as they usually do. Patient is due for dialysis tomorrow and reported no shortness of breath or other complaints. She said that muscle aches and back pain improved with her pain medicine and she agreed to go home and follow-up with primary care doctor and continue her regular dialysis schedule. DISCHARGE DIAGNOSES / PLAN:      1. Respiratory failure. Resolved. 2. Pulmonary edema. Mild and stable. 3. Rhinorrhea and body aches. Likely due to viral illness. Improved. 4. Back pain. Improved. 5. ESRD on hemodialysis Monday, Wednesday, Friday. Stable. 6. Hypertension. Stable but. Resume home medications. Continue to follow with nephrology. 7. Lupus. Stable. PENDING TEST RESULTS:   At the time of discharge the following test results are still pending: none    FOLLOW UP APPOINTMENTS:    Follow-up Information     Follow up With Specialties Details Why Contact Mary Al NP Nurse Practitioner In 1 week Hospital follow up [id number] [facility] [address] [place] [address]             ADDITIONAL CARE RECOMMENDATIONS: none    DIET: Regular Diet    ACTIVITY: Activity as tolerated    WOUND CARE: none    EQUIPMENT needed: none      DISCHARGE MEDICATIONS:  Current Discharge Medication List      CONTINUE these medications which have NOT CHANGED    Details   oxyCODONE-acetaminophen (PERCOCET) 5-325 mg per tablet Take 1 Tablet by mouth every eight (8) hours as needed for Pain for up to 14 days. Max Daily Amount: 3 Tablets. Qty: 20 Tablet, Refills: 0    Associated Diagnoses: Pelvic pain; ESRD on hemodialysis ([facility]      ! ! norethindrone acetate (AYGESTIN) 5 mg tablet TAKE 1 TABLET BY MOUTH DAILY  Qty: 90 Tablet, Refills: 5    Comments: **Patient requests 90 days supply**      !! norethindrone acetate (AYGESTIN) 5 mg tablet TAKE 1 TABLET BY MOUTH DAILY  Qty: 90 Tablet, Refills: 3    Comments: **Patient requests 90 days supply**      hydroxychloroquine sulfate (PLAQUENIL PO) Plaquenil      ondansetron (Zofran ODT) 4 mg disintegrating tablet Take 1 Tab by mouth every eight (8) hours as needed for Nausea. Qty: 15 Tab, Refills: 0      pantoprazole (PROTONIX) 40 mg tablet Take 1 Tab by mouth ACB/HS. Qty: 60 Tab, Refills: 0      ferric citrate (AURYXIA) 210 mg iron tablet Take 420 mg by mouth three (3) times daily (with meals). carvedilol (COREG) 25 mg tablet Take 1 Tab by mouth two (2) times daily (with meals). Qty: 60 Tab, Refills: 0      predniSONE (DELTASONE) 5 mg tablet Take 2 Tabs by mouth daily. Qty: 30 Tab, Refills: 0      gemfibrozil (LOPID) 600 mg tablet Take 300 mg by mouth daily.       azaTHIOprine (IMURAN) 50 mg tablet Take 50 mg by mouth daily (after breakfast). !! - Potential duplicate medications found. Please discuss with provider. NOTIFY YOUR PHYSICIAN FOR ANY OF THE FOLLOWING:   Fever over 101 degrees for 24 hours. Chest pain, shortness of breath, fever, chills, nausea, vomiting, diarrhea, change in mentation, falling, weakness, bleeding. Severe pain or pain not relieved by medications. Or, any other signs or symptoms that you may have questions about. DISPOSITION:   X Home With:   OT  PT  HH  RN       Long term SNF/Inpatient Rehab    Independent/assisted living    Hospice    Other:       PATIENT CONDITION AT DISCHARGE:     Functional status    Poor     Deconditioned    X Independent      Cognition    X Lucid     Forgetful     Dementia      Catheters/lines (plus indication)    Franco     PICC     PEG    X None      Code status    X Full code     DNR      PHYSICAL EXAMINATION AT DISCHARGE:  Vitals reviewed. Constitutional:       General: She is not in acute distress. Appearance: Normal appearance. She is not ill-appearing or toxic-appearing. HENT:      Head: Normocephalic and atraumatic. Nose: Rhinorrhea present. No congestion. Mouth/Throat:      Mouth: Mucous membranes are dry. Pharynx: Oropharynx is clear. No oropharyngeal exudate or posterior oropharyngeal erythema. Eyes:      General: No scleral icterus. Extraocular Movements: Extraocular movements intact. Pupils: Pupils are equal, round, and reactive to light. Cardiovascular:      Rate and Rhythm: Normal rate and regular rhythm. Pulses: Normal pulses. Heart sounds: Normal heart sounds. No murmur heard. No friction rub. No gallop. Pulmonary:      Effort: Pulmonary effort is normal.      Breath sounds: Rales (Slight diffuse crackles. ) present. Abdominal:      General: Bowel sounds are normal. There is no distension. Palpations: Abdomen is soft. There is no mass. Tenderness: There is no abdominal tenderness. There is no guarding or rebound. Hernia: No hernia is present. Musculoskeletal:         General: Normal range of motion. Cervical back: Normal range of motion and neck supple. No rigidity or tenderness. Right lower leg: Edema (1-2+. ) present. Left lower leg: Edema (1-2+. ) present. Skin:     General: Skin is warm and dry. Capillary Refill: Capillary refill takes less than 2 seconds. Coloration: Skin is not jaundiced or pale. Findings: No bruising or erythema. Neurological:      General: No focal deficit present. Mental Status: She is alert and oriented to person, place, and time. Cranial Nerves: No cranial nerve deficit. Psychiatric:         Mood and Affect: Mood normal.         Behavior: Behavior normal.         Thought Content:  Thought content normal.         Judgment: Judgment normal.       CHRONIC MEDICAL DIAGNOSES:  Problem List as of 3/20/2022 Date Reviewed: 10/25/2021          Codes Class Noted - Resolved    Respiratory failure ([facility] ICD-10-CM: J96.90  ICD-9-CM: 518.81  3/20/2022 - Present        Chronic pain ICD-10-CM: G89.29  ICD-9-CM: 338.29  Unknown - Present        Opioid use, unspecified with unspecified opioid-induced disorder ICD-10-CM: F11.99  ICD-9-CM: 292.9, 305.50  8/10/2021 - Present        Opioid dependence with unspecified opioid-induced disorder ICD-10-CM: F11.29  ICD-9-CM: 292.9, 304.00  8/10/2021 - Present        Opioid dependence, uncomplicated [id number]: U08.12  ICD-9-CM: 304.00  8/10/2021 - Present        Pelvic pain ICD-10-CM: R10.2  ICD-9-CM: Brien Board  5/27/2021 - Present        Dysmenorrhea ICD-10-CM: N94.6  ICD-9-CM: 625.3  5/20/2021 - Present        Pelvic mass ICD-10-CM: R19.00  ICD-9-CM: 789.30  3/16/2021 - Present        Severe obesity ([facility] ICD-10-CM: E66.01  ICD-9-CM: 278.01  11/19/2020 - Present        Seizure ([facility] ICD-10-CM: R56.9  ICD-9-CM: 780.39  10/18/2019 - Present        ESRD on hemodialysis ([facility] ICD-10-CM: N18.6, Z99.2  ICD-9-CM: 585.6, V45.11  8/27/2018 - Present        Intra-abdominal adhesions ICD-10-CM: K66.0  ICD-9-CM: 568.0  5/12/2018 - Present        Other constipation ICD-10-CM: K59.09  ICD-9-CM: 564.09  4/4/2018 - Present        History of pulmonary embolism ICD-10-CM: Q01.360  ICD-9-CM: V12.55  12/8/2017 - Present        Anemia of renal disease ICD-10-CM: N18.9, D63.1  ICD-9-CM: 285.21  2/21/2017 - Present        ACP (advance care planning) ICD-10-CM: Z71.89  ICD-9-CM: V65.49  2/21/2017 - Present        Malignant hypertension ICD-10-CM: I10  ICD-9-CM: 401.0  7/11/2016 - Present        Lupus nephritis ([facility] ICD-10-CM: M32.14  ICD-9-CM: 710.0, 583.81  3/10/2012 - Present        RESOLVED: Respiratory failure ([facility] ICD-10-CM: J96.90  ICD-9-CM: 518.81  3/20/2022 - 3/20/2022        RESOLVED: Pneumonia ICD-10-CM: J18.9  ICD-9-CM: 750  3/14/2020 - 11/19/2020        RESOLVED: Emesis, persistent ICD-10-CM: R11.15  ICD-9-CM: 536.2  8/11/2019 - 8/14/2019        RESOLVED: SBO (small bowel obstruction) ([facility] ICD-10-CM: F98.319  ICD-9-CM: 560.9  7/31/2019 - 8/14/2019        RESOLVED: HCAP (healthcare-associated pneumonia) ICD-10-CM: J18.9  ICD-9-CM: 968  7/16/2019 - 11/19/2020        RESOLVED: Hyperkalemia ICD-10-CM: E87.5  ICD-9-CM: 276.7  3/4/2019 - 11/19/2020        RESOLVED: Neutropenia ([facility] ICD-10-CM: D70.9  ICD-9-CM: 288.00  9/15/2018 - 9/19/2018        RESOLVED: Anemia in chronic kidney disease ICD-10-CM: N18.9, D63.1  ICD-9-CM: 285.21  9/15/2018 - 9/19/2018        RESOLVED: Malignant hypertensive urgency ICD-10-CM: I16.0  ICD-9-CM: 401.0  9/10/2018 - 9/19/2018        RESOLVED: Hypertensive urgency ICD-10-CM: I16.0  ICD-9-CM: 401.9  7/19/2018 - 7/22/2018        RESOLVED: Sepsis ([facility] ICD-10-CM: A41.9  ICD-9-CM: 038.9, 995.91  4/29/2018 - 11/19/2020        RESOLVED: Peritonitis ([facility] ICD-10-CM: K65.9  ICD-9-CM: 567.9  3/11/2018 - 11/19/2020        RESOLVED: Sepsis ([facility] ICD-10-CM: A41.9  ICD-9-CM: 038.9, 995.91  12/12/2017 - 12/22/2017        RESOLVED: Hypokalemia ICD-10-CM: E87.6  ICD-9-CM: 276.8  10/27/2017 - 11/19/2020        RESOLVED: Pneumonia ICD-10-CM: J18.9  ICD-9-CM: 486  10/14/2017 - 12/8/2017        RESOLVED: Pleural effusion, left ICD-10-CM: J90  ICD-9-CM: 511.9  10/14/2017 - 12/8/2017        RESOLVED: ESRD on peritoneal dialysis ([facility] (Chronic) ICD-10-CM: N18.6, Z99.2  ICD-9-CM: 585.6, V45.11  10/7/2016 - 8/27/2018        RESOLVED: Idiopathic chronic inflammatory bowel disease ICD-10-CM: K52.9  ICD-9-CM: 558.9  8/28/2013 - 8/28/2013        RESOLVED: Abdominal pain ICD-10-CM: R10.9  ICD-9-CM: 789.00  8/28/2013 - 9/3/2013        RESOLVED: Hypoxia ICD-10-CM: R09.02  ICD-9-CM: 799.02  4/25/2013 - 4/30/2013        RESOLVED: Lupus nephritis ([facility] ICD-10-CM: M32.14  ICD-9-CM: 710.0, 583.81  4/27/2012 - 4/28/2012              Greater than 32 minutes were spent with the patient on counseling and coordination of care    Signed:   Quinn Fong MD  3/20/2022  4:23 PM Discharge Destination: home Follow-Up: 7 days Detail Level: Detailed

## 2022-10-31 DIAGNOSIS — Z99.2 ESRD ON HEMODIALYSIS (HCC): ICD-10-CM

## 2022-10-31 DIAGNOSIS — N18.6 ESRD ON HEMODIALYSIS (HCC): ICD-10-CM

## 2022-10-31 DIAGNOSIS — R10.2 PELVIC PAIN: ICD-10-CM

## 2022-10-31 RX ORDER — OXYCODONE AND ACETAMINOPHEN 5; 325 MG/1; MG/1
1 TABLET ORAL
Qty: 20 TABLET | Refills: 0 | Status: SHIPPED | OUTPATIENT
Start: 2022-10-31 | End: 2022-11-17 | Stop reason: SDUPTHER

## 2022-11-17 DIAGNOSIS — N18.6 ESRD ON HEMODIALYSIS (HCC): ICD-10-CM

## 2022-11-17 DIAGNOSIS — Z99.2 ESRD ON HEMODIALYSIS (HCC): ICD-10-CM

## 2022-11-17 DIAGNOSIS — R10.2 PELVIC PAIN: ICD-10-CM

## 2022-11-17 RX ORDER — OXYCODONE AND ACETAMINOPHEN 5; 325 MG/1; MG/1
1 TABLET ORAL
Qty: 20 TABLET | Refills: 0 | Status: SHIPPED | OUTPATIENT
Start: 2022-11-17 | End: 2022-11-29 | Stop reason: SDUPTHER

## 2022-11-29 DIAGNOSIS — R10.2 PELVIC PAIN: ICD-10-CM

## 2022-11-29 DIAGNOSIS — N18.6 ESRD ON HEMODIALYSIS (HCC): ICD-10-CM

## 2022-11-29 DIAGNOSIS — Z99.2 ESRD ON HEMODIALYSIS (HCC): ICD-10-CM

## 2022-11-30 RX ORDER — OXYCODONE AND ACETAMINOPHEN 5; 325 MG/1; MG/1
1 TABLET ORAL
Qty: 20 TABLET | Refills: 0 | Status: SHIPPED | OUTPATIENT
Start: 2022-11-30 | End: 2022-12-14

## 2022-12-06 ENCOUNTER — APPOINTMENT (OUTPATIENT)
Dept: GENERAL RADIOLOGY | Age: 37
DRG: 194 | End: 2022-12-06
Attending: EMERGENCY MEDICINE
Payer: MEDICARE

## 2022-12-06 ENCOUNTER — HOSPITAL ENCOUNTER (INPATIENT)
Age: 37
LOS: 2 days | Discharge: HOME | DRG: 194 | End: 2022-12-08
Attending: EMERGENCY MEDICINE | Admitting: FAMILY MEDICINE
Payer: MEDICARE

## 2022-12-06 DIAGNOSIS — J10.1 INFLUENZA A: ICD-10-CM

## 2022-12-06 DIAGNOSIS — D84.9 IMMUNOCOMPROMISED (HCC): ICD-10-CM

## 2022-12-06 DIAGNOSIS — R09.02 HYPOXIA: Primary | ICD-10-CM

## 2022-12-06 DIAGNOSIS — J06.9 ACUTE URI: ICD-10-CM

## 2022-12-06 PROBLEM — R06.02 SOB (SHORTNESS OF BREATH): Status: ACTIVE | Noted: 2022-12-06

## 2022-12-06 LAB
ANION GAP SERPL CALC-SCNC: 8 MMOL/L (ref 5–15)
ATRIAL RATE: 94 BPM
BASOPHILS # BLD: 0 K/UL (ref 0–0.1)
BASOPHILS NFR BLD: 0 % (ref 0–1)
BNP SERPL-MCNC: 736 PG/ML (ref 0–125)
BUN SERPL-MCNC: 11 MG/DL (ref 6–20)
BUN/CREAT SERPL: 12 (ref 12–20)
CALCIUM SERPL-MCNC: 9.2 MG/DL (ref 8.5–10.1)
CALCULATED P AXIS, ECG09: 56 DEGREES
CALCULATED R AXIS, ECG10: 50 DEGREES
CALCULATED T AXIS, ECG11: 40 DEGREES
CHLORIDE SERPL-SCNC: 102 MMOL/L (ref 97–108)
CO2 SERPL-SCNC: 24 MMOL/L (ref 21–32)
CREAT SERPL-MCNC: 0.93 MG/DL (ref 0.55–1.02)
DIAGNOSIS, 93000: NORMAL
DIFFERENTIAL METHOD BLD: ABNORMAL
EOSINOPHIL # BLD: 0.1 K/UL (ref 0–0.4)
EOSINOPHIL NFR BLD: 1 % (ref 0–7)
ERYTHROCYTE [DISTWIDTH] IN BLOOD BY AUTOMATED COUNT: 13.2 % (ref 11.5–14.5)
FLUAV AG NPH QL IA: POSITIVE
FLUBV AG NOSE QL IA: NEGATIVE
GLUCOSE SERPL-MCNC: 128 MG/DL (ref 65–100)
HCG SERPL QL: NEGATIVE
HCT VFR BLD AUTO: 41.5 % (ref 35–47)
HGB BLD-MCNC: 13.1 G/DL (ref 11.5–16)
IMM GRANULOCYTES # BLD AUTO: 0 K/UL
IMM GRANULOCYTES NFR BLD AUTO: 0 %
LYMPHOCYTES # BLD: 0.6 K/UL (ref 0.8–3.5)
LYMPHOCYTES NFR BLD: 9 % (ref 12–49)
MCH RBC QN AUTO: 28.7 PG (ref 26–34)
MCHC RBC AUTO-ENTMCNC: 31.6 G/DL (ref 30–36.5)
MCV RBC AUTO: 91 FL (ref 80–99)
MONOCYTES # BLD: 0.3 K/UL (ref 0–1)
MONOCYTES NFR BLD: 5 % (ref 5–13)
NEUTS BAND NFR BLD MANUAL: 5 % (ref 0–6)
NEUTS SEG # BLD: 5.8 K/UL (ref 1.8–8)
NEUTS SEG NFR BLD: 80 % (ref 32–75)
NRBC # BLD: 0 K/UL (ref 0–0.01)
NRBC BLD-RTO: 0 PER 100 WBC
P-R INTERVAL, ECG05: 156 MS
PLATELET # BLD AUTO: 215 K/UL (ref 150–400)
PMV BLD AUTO: 11.5 FL (ref 8.9–12.9)
POTASSIUM SERPL-SCNC: 5.1 MMOL/L (ref 3.5–5.1)
Q-T INTERVAL, ECG07: 350 MS
QRS DURATION, ECG06: 84 MS
QTC CALCULATION (BEZET), ECG08: 437 MS
RBC # BLD AUTO: 4.56 M/UL (ref 3.8–5.2)
RBC MORPH BLD: ABNORMAL
SODIUM SERPL-SCNC: 134 MMOL/L (ref 136–145)
TROPONIN I BLD-MCNC: <0.04 NG/ML (ref 0–0.08)
TROPONIN-HIGH SENSITIVITY: 7 NG/L (ref 0–51)
VENTRICULAR RATE, ECG03: 94 BPM
WBC # BLD AUTO: 6.8 K/UL (ref 3.6–11)

## 2022-12-06 PROCEDURE — 77030029684 HC NEB SM VOL KT MONA -A

## 2022-12-06 PROCEDURE — 74011250636 HC RX REV CODE- 250/636: Performed by: EMERGENCY MEDICINE

## 2022-12-06 PROCEDURE — 83880 ASSAY OF NATRIURETIC PEPTIDE: CPT

## 2022-12-06 PROCEDURE — 80048 BASIC METABOLIC PNL TOTAL CA: CPT

## 2022-12-06 PROCEDURE — 74011000250 HC RX REV CODE- 250: Performed by: EMERGENCY MEDICINE

## 2022-12-06 PROCEDURE — 99285 EMERGENCY DEPT VISIT HI MDM: CPT

## 2022-12-06 PROCEDURE — 84484 ASSAY OF TROPONIN QUANT: CPT

## 2022-12-06 PROCEDURE — 71046 X-RAY EXAM CHEST 2 VIEWS: CPT

## 2022-12-06 PROCEDURE — 36415 COLL VENOUS BLD VENIPUNCTURE: CPT

## 2022-12-06 PROCEDURE — 74011250637 HC RX REV CODE- 250/637: Performed by: EMERGENCY MEDICINE

## 2022-12-06 PROCEDURE — 87804 INFLUENZA ASSAY W/OPTIC: CPT

## 2022-12-06 PROCEDURE — 93005 ELECTROCARDIOGRAM TRACING: CPT

## 2022-12-06 PROCEDURE — 84703 CHORIONIC GONADOTROPIN ASSAY: CPT

## 2022-12-06 PROCEDURE — 85025 COMPLETE CBC W/AUTO DIFF WBC: CPT

## 2022-12-06 PROCEDURE — 65270000046 HC RM TELEMETRY

## 2022-12-06 RX ORDER — ACETAMINOPHEN 500 MG
1000 TABLET ORAL ONCE
Status: COMPLETED | OUTPATIENT
Start: 2022-12-06 | End: 2022-12-06

## 2022-12-06 RX ORDER — OSELTAMIVIR PHOSPHATE 75 MG/1
75 CAPSULE ORAL
Status: COMPLETED | OUTPATIENT
Start: 2022-12-06 | End: 2022-12-06

## 2022-12-06 RX ORDER — ACETAMINOPHEN 325 MG/1
650 TABLET ORAL ONCE
Status: COMPLETED | OUTPATIENT
Start: 2022-12-06 | End: 2022-12-06

## 2022-12-06 RX ORDER — ALBUTEROL SULFATE 0.83 MG/ML
10 SOLUTION RESPIRATORY (INHALATION)
Status: COMPLETED | OUTPATIENT
Start: 2022-12-06 | End: 2022-12-06

## 2022-12-06 RX ORDER — VANCOMYCIN/0.9 % SOD CHLORIDE 1.5G/250ML
1500 PLASTIC BAG, INJECTION (ML) INTRAVENOUS ONCE
Status: DISCONTINUED | OUTPATIENT
Start: 2022-12-06 | End: 2022-12-06 | Stop reason: DRUGHIGH

## 2022-12-06 RX ORDER — IPRATROPIUM BROMIDE AND ALBUTEROL SULFATE 2.5; .5 MG/3ML; MG/3ML
6 SOLUTION RESPIRATORY (INHALATION)
Status: COMPLETED | OUTPATIENT
Start: 2022-12-06 | End: 2022-12-06

## 2022-12-06 RX ADMIN — AZITHROMYCIN DIHYDRATE 500 MG: 500 INJECTION, POWDER, LYOPHILIZED, FOR SOLUTION INTRAVENOUS at 19:06

## 2022-12-06 RX ADMIN — VANCOMYCIN HYDROCHLORIDE 1250 MG: 1.25 INJECTION, POWDER, LYOPHILIZED, FOR SOLUTION INTRAVENOUS at 20:38

## 2022-12-06 RX ADMIN — SODIUM CHLORIDE 500 ML: 9 INJECTION, SOLUTION INTRAVENOUS at 15:51

## 2022-12-06 RX ADMIN — ACETAMINOPHEN 650 MG: 325 TABLET ORAL at 22:18

## 2022-12-06 RX ADMIN — OSELTAMIVIR PHOSPHATE 75 MG: 75 CAPSULE ORAL at 15:42

## 2022-12-06 RX ADMIN — CEFEPIME 2 G: 2 INJECTION, POWDER, FOR SOLUTION INTRAVENOUS at 18:59

## 2022-12-06 RX ADMIN — IPRATROPIUM BROMIDE AND ALBUTEROL SULFATE 6 ML: .5; 3 SOLUTION RESPIRATORY (INHALATION) at 15:52

## 2022-12-06 RX ADMIN — ALBUTEROL SULFATE 10 MG: 2.5 SOLUTION RESPIRATORY (INHALATION) at 17:28

## 2022-12-06 RX ADMIN — ACETAMINOPHEN 1000 MG: 500 TABLET ORAL at 15:41

## 2022-12-06 NOTE — ED PROVIDER NOTES
37F w/ hx SLE, CKD s/p renal transplant 3/2022 at [facility], HTN, HLD, PE p/w cough x2d. Pt reports 2d cough, congestion, rhionrrhea. Also body aches, fatigue and yesterday had a Temp 100.3. Also feeling chest tightness, wheezing and dyspnea. No dizziness, syncope, leg swelling. chest or abd pain. No N/V/D or urinary symptoms. Pt had a renal transplant at [facility] 3/2022 and on immunosuppressive therapy. No hx of smoking or asthma/COPD. Taking mucinex for symptoms. No recent surgeries, hospitalizations, travel, hx of malignancy, exogenous estrogen use, hemoptysis, LE pain/swelling. Past Medical History:   Diagnosis Date    Anemia     secondary to lupus    Asthma     no inhaler use in past 2 to 3 years    Carditis     Chronic kidney disease     ESRD    Chronic pain     DDD (degenerative disc disease), lumbar     ESRD (end stage renal disease) ([address]     Fibroid tumor     GERD (gastroesophageal reflux disease)     HCAP (healthcare-associated pneumonia) 2019    Hemodialysis patient ([address] 2017    [address]  Tuesday,  Thursday,  and Saturday. Hypercholesterolemia     Hyperkalemia 3/4/2019    Hypertension     Hypokalemia 10/27/2017    Intra-abdominal abscess ([address] 2018    Intractable nausea and vomiting 10/21/2015    Long term (current) use of anticoagulants     Lupus (systemic lupus erythematosus) ([address]     Malignant hypertension with chronic kidney disease stage V ([address]     Peritoneal dialysis status ([address] 10/2015    x 2 years Stopped 2017 due to infection and removed. Peritonitis ([address] 3/11/2018    Pneumonia 3/14/2020    Poor historian 2018    With medications    Sepsis ([address] 2018    Thromboembolus ([address]     lungs    Transfusion history     Last Transfusion 2017  at [address] [place] [address]       Past Surgical History:   Procedure Laterality Date    HX  SECTION  11/2006    x1    HX OTHER SURGICAL  2015    INSERTION PD CATH;  Removed 2017    HX RENAL TRANSPLANT 03/22/2022    HX VASCULAR ACCESS Right 12/2017    Double-Lumen henry catheter upper chest    HX VASCULAR ACCESS Right 2018    right upper arm         Family History:   Problem Relation Age of Onset    Diabetes Father     Hypertension Father     Cancer Other         aunt with breast cancerX2    Diabetes Mother        Social History     Socioeconomic History    Marital status: SINGLE     Spouse name: Not on file    Number of children: Not on file    Years of education: Not on file    Highest education level: Not on file   Occupational History    Not on file   Tobacco Use    Smoking status: Never    Smokeless tobacco: Never   Substance and Sexual Activity    Alcohol use: No    Drug use: Yes     Types: Prescription, OTC    Sexual activity: Not Currently   Other Topics Concern     Service No    Blood Transfusions No    Caffeine Concern No    Occupational Exposure No    Hobby Hazards No    Sleep Concern No    Stress Concern No    Weight Concern No    Special Diet No    Back Care Yes     Comment: low back pain    Exercise No    Bike Helmet No    Seat Belt Yes    Self-Exams No   Social History Narrative    Lives with parents and daughter. Social Determinants of Health     Financial Resource Strain: Not on file   Food Insecurity: Not on file   Transportation Needs: Not on file   Physical Activity: Not on file   Stress: Not on file   Social Connections: Not on file   Intimate Partner Violence: Not on file   Housing Stability: Not on file         ALLERGIES: Compazine [prochlorperazine edisylate]    Review of Systems   Constitutional:  Positive for chills, fatigue and fever. Negative for diaphoresis. HENT:  Positive for congestion. Negative for facial swelling, mouth sores, nosebleeds, trouble swallowing and voice change. Eyes:  Negative for pain and visual disturbance. Respiratory:  Positive for cough, chest tightness, shortness of breath and wheezing. Negative for apnea, choking and stridor. Cardiovascular:  Negative for chest pain, palpitations and leg swelling. Gastrointestinal:  Negative for abdominal distention, abdominal pain, blood in stool, diarrhea, nausea and vomiting. Genitourinary:  Negative for difficulty urinating, dysuria, flank pain, hematuria and pelvic pain. Musculoskeletal:  Negative for joint swelling. Skin:  Negative for color change and rash. Allergic/Immunologic: Negative for immunocompromised state. Neurological:  Negative for dizziness, seizures, syncope, speech difficulty and light-headedness. Hematological:  Does not bruise/bleed easily. Psychiatric/Behavioral:  Negative for agitation and behavioral problems. Vitals:    12/07/22 0526 12/07/22 0528 12/07/22 0529 12/07/22 0633   BP: 114/84  114/84 125/84   Pulse: 92 90 88 89   Resp: 23 18 15 16   Temp:   98 °F (36.7 °C) 97.6 °F (36.4 °C)   SpO2: 91% 93% 95% 95%   Weight:                Physical Exam  Vitals and nursing note reviewed. Constitutional:       General: She is not in acute distress. Appearance: Normal appearance. She is not ill-appearing or toxic-appearing. HENT:      Head: Normocephalic and atraumatic. Right Ear: External ear normal.      Left Ear: External ear normal.      Nose: Nose normal.      Mouth/Throat:      Mouth: Mucous membranes are moist.      Pharynx: Oropharynx is clear. No oropharyngeal exudate or posterior oropharyngeal erythema. Eyes:      General: No scleral icterus. Extraocular Movements: Extraocular movements intact. Conjunctiva/sclera: Conjunctivae normal.      Pupils: Pupils are equal, round, and reactive to light. Cardiovascular:      Rate and Rhythm: Normal rate and regular rhythm. Pulses: Normal pulses. Heart sounds: Normal heart sounds. No murmur heard. No friction rub. No gallop. Pulmonary:      Effort: Pulmonary effort is normal. No respiratory distress. Breath sounds: No stridor. Wheezing present. No rhonchi or rales. Abdominal:      General: There is no distension. Palpations: Abdomen is soft. Tenderness: There is no abdominal tenderness. There is no guarding or rebound. Musculoskeletal:         General: No tenderness or deformity. Normal range of motion. Cervical back: Normal range of motion and neck supple. No rigidity. Right lower leg: No edema. Left lower leg: No edema. Skin:     General: Skin is warm. Capillary Refill: Capillary refill takes less than 2 seconds. Coloration: Skin is not jaundiced. Neurological:      General: No focal deficit present. Mental Status: She is alert. Cranial Nerves: No cranial nerve deficit. Sensory: No sensory deficit. Motor: No weakness. Coordination: Coordination normal.   Psychiatric:         Mood and Affect: Mood normal.         Behavior: Behavior normal.         Thought Content:  Thought content normal.         Judgment: Judgment normal.        EKG Interpretation   SR, narrow QRS, nl intervals, no IZABELLA/STD/TWI  (EKG tracing interpreted by ED physician)    LABORATORY TESTS:  Recent Results (from the past 24 hour(s))   EKG, 12 LEAD, INITIAL    Collection Time: 12/06/22  2:42 PM   Result Value Ref Range    Ventricular Rate 94 BPM    Atrial Rate 94 BPM    P-R Interval 156 ms    QRS Duration 84 ms    Q-T Interval 350 ms    QTC Calculation (Bezet) 437 ms    Calculated P Axis 56 degrees    Calculated R Axis 50 degrees    Calculated T Axis 40 degrees    Diagnosis       Normal sinus rhythm    When compared with ECG of 29-SEP-2022 20:44,  No significant change was found  Confirmed by Mauricio Andrade MD. ([id number]) on 12/6/2022 3:13:46 PM     CBC WITH AUTOMATED DIFF    Collection Time: 12/06/22  2:46 PM   Result Value Ref Range    WBC 6.8 3.6 - 11.0 K/uL    RBC 4.56 3.80 - 5.20 M/uL    HGB 13.1 11.5 - 16.0 g/dL    HCT 41.5 35.0 - 47.0 %    MCV 91.0 80.0 - 99.0 FL    MCH 28.7 26.0 - 34.0 PG    MCHC 31.6 30.0 - 36.5 g/dL    RDW 13.2 11.5 - 14.5 %    PLATELET 956 835 - 857 K/uL    MPV 11.5 8.9 - 12.9 FL    NRBC 0.0 0  WBC    ABSOLUTE NRBC 0.00 0.00 - 0.01 K/uL    NEUTROPHILS 80 (H) 32 - 75 %    BAND NEUTROPHILS 5 0 - 6 %    LYMPHOCYTES 9 (L) 12 - 49 %    MONOCYTES 5 5 - 13 %    EOSINOPHILS 1 0 - 7 %    BASOPHILS 0 0 - 1 %    IMMATURE GRANULOCYTES 0 %    ABS. NEUTROPHILS 5.8 1.8 - 8.0 K/UL    ABS. LYMPHOCYTES 0.6 (L) 0.8 - 3.5 K/UL    ABS. MONOCYTES 0.3 0.0 - 1.0 K/UL    ABS. EOSINOPHILS 0.1 0.0 - 0.4 K/UL    ABS. BASOPHILS 0.0 0.0 - 0.1 K/UL    ABS. IMM. GRANS. 0.0 K/UL    DF MANUAL      RBC COMMENTS NORMOCYTIC, NORMOCHROMIC     METABOLIC PANEL, BASIC    Collection Time: 12/06/22  2:46 PM   Result Value Ref Range    Sodium 134 (L) 136 - 145 mmol/L    Potassium 5.1 3.5 - 5.1 mmol/L    Chloride 102 97 - 108 mmol/L    CO2 24 21 - 32 mmol/L    Anion gap 8 5 - 15 mmol/L    Glucose 128 (H) 65 - 100 mg/dL    BUN 11 6 - 20 MG/DL    Creatinine 0.93 0.55 - 1.02 MG/DL    BUN/Creatinine ratio 12 12 - 20      eGFR >60 >60 ml/min/1.73m2    Calcium 9.2 8.5 - 10.1 MG/DL   NT-PRO BNP    Collection Time: 12/06/22  2:46 PM   Result Value Ref Range    NT pro- (H) 0 - 125 PG/ML   HCG QL SERUM    Collection Time: 12/06/22  2:46 PM   Result Value Ref Range    HCG, Ql. Negative NEG     INFLUENZA A+B VIRAL AGS    Collection Time: 12/06/22  2:46 PM   Result Value Ref Range    Influenza A Antigen Positive (A) NEG      Influenza B Antigen Negative NEG     TROPONIN-HIGH SENSITIVITY    Collection Time: 12/06/22  2:46 PM   Result Value Ref Range    Troponin-High Sensitivity 7 0 - 51 ng/L   POC TROPONIN-I    Collection Time: 12/06/22  5:41 PM   Result Value Ref Range    Troponin-I (POC) <0.04 0.00 - 0.08 ng/mL         IMAGING RESULTS:  XR CHEST PA LAT   Final Result   Atypical interstitial infiltrate versus congestive failure and   interstitial pulmonary edema.           MEDICATIONS GIVEN:  Medications   oseltamivir (TAMIFLU) capsule 75 mg (has no administration in time range)   guaiFENesin (ROBITUSSIN) 100 mg/5 mL oral liquid 100 mg (has no administration in time range)   sodium chloride (NS) flush 5-40 mL (has no administration in time range)   sodium chloride (NS) flush 5-40 mL (has no administration in time range)   acetaminophen (TYLENOL) tablet 650 mg (has no administration in time range)     Or   acetaminophen (TYLENOL) suppository 650 mg (has no administration in time range)   polyethylene glycol (MIRALAX) packet 17 g (has no administration in time range)   ondansetron (ZOFRAN ODT) tablet 4 mg (has no administration in time range)     Or   ondansetron (ZOFRAN) injection 4 mg (has no administration in time range)   oseltamivir (TAMIFLU) capsule 75 mg (75 mg Oral Given 12/6/22 1542)   acetaminophen (TYLENOL) tablet 1,000 mg (1,000 mg Oral Given 12/6/22 1541)   albuterol-ipratropium (DUO-NEB) 2.5 MG-0.5 MG/3 ML (6 mL Nebulization Given 12/6/22 1552)   sodium chloride 0.9 % bolus infusion 500 mL (0 mL IntraVENous IV Completed 12/6/22 1708)   albuterol (PROVENTIL VENTOLIN) nebulizer solution 10 mg (10 mg Nebulization Given 12/6/22 1728)   azithromycin (ZITHROMAX) 500 mg in 0.9% sodium chloride 250 mL ([id number]) (0 mg IntraVENous IV Completed 12/6/22 2006)   cefepime (MAXIPIME) 2 g in 0.9% sodium chloride 10 mL IV syringe (2 g IntraVENous Given 12/6/22 1859)   vancomycin (VANCOCIN) 1,250 mg in 0.9% sodium chloride 250 mL ([id number]) (1,250 mg IntraVENous Given 12/6/22 2038)     And   vancomycin (VANCOCIN) 1,250 mg in 0.9% sodium chloride 250 mL ([id number]) (1,250 mg IntraVENous Given 12/6/22 2038)   acetaminophen (TYLENOL) tablet 650 mg (650 mg Oral Given 12/6/22 2218)   morphine injection 4 mg (4 mg IntraVENous Given 12/7/22 0008)   ondansetron (ZOFRAN) injection 4 mg (4 mg IntraVENous Given 12/7/22 0007)   morphine injection 4 mg (4 mg IntraVENous Given 12/7/22 0547)       PROGRESS NOTE:   1800 Patient's symptoms have improved after treatment      IMPRESSION:  1. Hypoxia    2. Influenza A    3. Acute URI    4. Immunocompromised ([facility]        PLAN:  - Admit to hospitalist    Rosa Rodriguez MD      MDM  Number of Diagnoses or Management Options  Acute URI  Hypoxia  Immunocompromised ([facility]  Influenza A  Diagnosis management comments: 37F w/ hx SLE, CKD s/p renal transplant 3/2022 at [facility], HTN, HLD, PE p/w cough, fatigue, wheezing, body aches x2d. Pt nontoxic but diminished BS w/ tachypnea, wheezing and sats 90% on RA. Hemodynamically stable. Ddx includes PNA vs URI/bronchitis/flu/COVID19 vs COPD/asthma exacerbation vs decompensated CHF vs PNX vs pulm edema/pleural effusion vs valvular heart disease vs pulm fibrosis/HTN vs interstitial lung disease vs malignancy/mets vs ACS vs cardiac dysrythmia vs HTN emergency/urgency vs symptomatic anemia vs electrolyte/metabolic, less likely PE based on Wells/Quay score, unlikely pericardial effusion/tamponade based on presentation. Ordered CXR, EKG, labs. Give duonebs. Monitor and reassess. 1800 Pt remains stable. Still wheezing after nebs, additional 1hr long ordered. CXR showing interstitial infiltrate vs edema. EKG SR w/o ischemic changes. Trop neg x1. BNP mildly elevated 700 (although previously >31167D). Flu A+. Given pt's immunocompromised status, started on tamiflu and also covered w/ broad spectrum abx vanc/cefepime/azithro for possible superimposed bacterial PNA. Admit to tele for FLU, PNA and respiratory distress.        Amount and/or Complexity of Data Reviewed  Clinical lab tests: ordered and reviewed  Tests in the radiology section of CPT®: ordered and reviewed  Tests in the medicine section of CPT®: reviewed and ordered  Discussion of test results with the performing providers: yes  Decide to obtain previous medical records or to obtain history from someone other than the patient: yes  Obtain history from someone other than the patient: yes  Discuss the patient with other providers: yes  Independent visualization of images, tracings, or specimens: yes    Risk of Complications, Morbidity, and/or Mortality  Presenting problems: moderate  Diagnostic procedures: high  Management options: high           Critical Care  Performed by: Jennifer Singh MD  Authorized by: Jennifer Singh MD     Critical care provider statement:     Critical care time (minutes):  42    Critical care was necessary to treat or prevent imminent or life-threatening deterioration of the following conditions:  Respiratory failure    Critical care was time spent personally by me on the following activities:  Development of treatment plan with patient or surrogate, discussions with primary provider, evaluation of patient's response to treatment, blood draw for specimens, interpretation of cardiac output measurements, obtaining history from patient or surrogate, ordering and performing treatments and interventions, ordering and review of laboratory studies, ordering and review of radiographic studies, pulse oximetry, re-evaluation of patient's condition and review of old charts    Total critical care time (not including time spent performing separately reportable procedures): 43      Perfect Serve Consult for Admission  6:15 PM    ED Room Number: 217/01  Patient Name and age:  Edgard Vero 40 y.o.  female  Working Diagnosis:   1. Hypoxia    2. Influenza A    3. Acute URI    4.  Immunocompromised ([address]        COVID-19 Suspicion:  no  Sepsis present:  no  Reassessment needed: yes  Code Status:  Full Code  Readmission: no  Isolation Requirements:  yes  Recommended Level of Care:  telemetry  Department:[facility] - [phone]

## 2022-12-06 NOTE — ED TRIAGE NOTES
Triage: pt c/o chest congestion, runny nose, cough and fever x few days. Symptoms unrelieved by Mucinex. PCP has not been contacted.

## 2022-12-06 NOTE — ED NOTES
istat troponin drawn and brought to the lab to run. Pt was able to ambulate to the bathroom and back without difficulty. Pt does have audible wheezing while sitting on the stretcher.  Dr. Diego Kelly notified of this and verbally stated she needed another albuterol neb

## 2022-12-07 PROCEDURE — 65270000046 HC RM TELEMETRY

## 2022-12-07 PROCEDURE — 74011636637 HC RX REV CODE- 636/637: Performed by: INTERNAL MEDICINE

## 2022-12-07 PROCEDURE — 74011250636 HC RX REV CODE- 250/636: Performed by: PHYSICIAN ASSISTANT

## 2022-12-07 PROCEDURE — 74011250637 HC RX REV CODE- 250/637: Performed by: PHYSICIAN ASSISTANT

## 2022-12-07 PROCEDURE — 74011000250 HC RX REV CODE- 250: Performed by: PHYSICIAN ASSISTANT

## 2022-12-07 PROCEDURE — 74011250636 HC RX REV CODE- 250/636: Performed by: INTERNAL MEDICINE

## 2022-12-07 PROCEDURE — 74011250636 HC RX REV CODE- 250/636: Performed by: EMERGENCY MEDICINE

## 2022-12-07 RX ORDER — AMITRIPTYLINE HYDROCHLORIDE 25 MG/1
50 TABLET, FILM COATED ORAL
Status: DISCONTINUED | OUTPATIENT
Start: 2022-12-07 | End: 2022-12-08 | Stop reason: HOSPADM

## 2022-12-07 RX ORDER — LANOLIN ALCOHOL/MO/W.PET/CERES
400 CREAM (GRAM) TOPICAL 2 TIMES DAILY
COMMUNITY
Start: 2022-09-29 | End: 2023-09-29

## 2022-12-07 RX ORDER — ACETAMINOPHEN 325 MG/1
650 TABLET ORAL
Status: DISCONTINUED | OUTPATIENT
Start: 2022-12-07 | End: 2022-12-08 | Stop reason: HOSPADM

## 2022-12-07 RX ORDER — IPRATROPIUM BROMIDE AND ALBUTEROL SULFATE 2.5; .5 MG/3ML; MG/3ML
3 SOLUTION RESPIRATORY (INHALATION)
Status: DISCONTINUED | OUTPATIENT
Start: 2022-12-07 | End: 2022-12-08 | Stop reason: HOSPADM

## 2022-12-07 RX ORDER — MORPHINE SULFATE 4 MG/ML
4 INJECTION INTRAVENOUS ONCE
Status: COMPLETED | OUTPATIENT
Start: 2022-12-07 | End: 2022-12-07

## 2022-12-07 RX ORDER — ONDANSETRON 4 MG/1
4 TABLET, ORALLY DISINTEGRATING ORAL
Status: DISCONTINUED | OUTPATIENT
Start: 2022-12-07 | End: 2022-12-08 | Stop reason: HOSPADM

## 2022-12-07 RX ORDER — TACROLIMUS 1 MG/1
3 CAPSULE ORAL EVERY 12 HOURS
Status: DISCONTINUED | OUTPATIENT
Start: 2022-12-07 | End: 2022-12-08

## 2022-12-07 RX ORDER — OSELTAMIVIR PHOSPHATE 75 MG/1
75 CAPSULE ORAL 2 TIMES DAILY
Status: DISCONTINUED | OUTPATIENT
Start: 2022-12-07 | End: 2022-12-08 | Stop reason: HOSPADM

## 2022-12-07 RX ORDER — ONDANSETRON 2 MG/ML
4 INJECTION INTRAMUSCULAR; INTRAVENOUS
Status: DISCONTINUED | OUTPATIENT
Start: 2022-12-07 | End: 2022-12-08 | Stop reason: HOSPADM

## 2022-12-07 RX ORDER — ACETAMINOPHEN 650 MG/1
650 SUPPOSITORY RECTAL
Status: DISCONTINUED | OUTPATIENT
Start: 2022-12-07 | End: 2022-12-08 | Stop reason: HOSPADM

## 2022-12-07 RX ORDER — AZITHROMYCIN 250 MG/1
500 TABLET, FILM COATED ORAL DAILY
Status: DISCONTINUED | OUTPATIENT
Start: 2022-12-07 | End: 2022-12-08 | Stop reason: HOSPADM

## 2022-12-07 RX ORDER — ENOXAPARIN SODIUM 100 MG/ML
30 INJECTION SUBCUTANEOUS EVERY 12 HOURS
Status: DISCONTINUED | OUTPATIENT
Start: 2022-12-07 | End: 2022-12-08 | Stop reason: HOSPADM

## 2022-12-07 RX ORDER — SODIUM CHLORIDE 0.9 % (FLUSH) 0.9 %
5-40 SYRINGE (ML) INJECTION AS NEEDED
Status: DISCONTINUED | OUTPATIENT
Start: 2022-12-07 | End: 2022-12-08 | Stop reason: HOSPADM

## 2022-12-07 RX ORDER — FAMOTIDINE 20 MG/1
40 TABLET, FILM COATED ORAL DAILY
Status: DISCONTINUED | OUTPATIENT
Start: 2022-12-07 | End: 2022-12-08 | Stop reason: HOSPADM

## 2022-12-07 RX ORDER — ONDANSETRON 2 MG/ML
4 INJECTION INTRAMUSCULAR; INTRAVENOUS ONCE
Status: COMPLETED | OUTPATIENT
Start: 2022-12-07 | End: 2022-12-07

## 2022-12-07 RX ORDER — FLUTICASONE PROPIONATE 50 MCG
1 SPRAY, SUSPENSION (ML) NASAL AS NEEDED
COMMUNITY
Start: 2022-10-31

## 2022-12-07 RX ORDER — PANTOPRAZOLE SODIUM 40 MG/1
40 TABLET, DELAYED RELEASE ORAL
COMMUNITY
Start: 2022-06-21 | End: 2023-06-21

## 2022-12-07 RX ORDER — CARVEDILOL 12.5 MG/1
25 TABLET ORAL 2 TIMES DAILY WITH MEALS
Status: DISCONTINUED | OUTPATIENT
Start: 2022-12-07 | End: 2022-12-08 | Stop reason: HOSPADM

## 2022-12-07 RX ORDER — POLYETHYLENE GLYCOL 3350 17 G/17G
17 POWDER, FOR SOLUTION ORAL DAILY PRN
Status: DISCONTINUED | OUTPATIENT
Start: 2022-12-07 | End: 2022-12-08 | Stop reason: HOSPADM

## 2022-12-07 RX ORDER — AMITRIPTYLINE HYDROCHLORIDE 150 MG/1
150 TABLET, FILM COATED ORAL
COMMUNITY
Start: 2022-11-23

## 2022-12-07 RX ORDER — PREDNISONE 10 MG/1
5 TABLET ORAL
Status: DISCONTINUED | OUTPATIENT
Start: 2022-12-07 | End: 2022-12-08 | Stop reason: HOSPADM

## 2022-12-07 RX ORDER — OXYCODONE AND ACETAMINOPHEN 5; 325 MG/1; MG/1
1 TABLET ORAL
Status: DISCONTINUED | OUTPATIENT
Start: 2022-12-07 | End: 2022-12-08 | Stop reason: HOSPADM

## 2022-12-07 RX ORDER — GUAIFENESIN 100 MG/5ML
100 SOLUTION ORAL
Status: DISCONTINUED | OUTPATIENT
Start: 2022-12-07 | End: 2022-12-08 | Stop reason: HOSPADM

## 2022-12-07 RX ORDER — SODIUM CHLORIDE 0.9 % (FLUSH) 0.9 %
5-40 SYRINGE (ML) INJECTION EVERY 8 HOURS
Status: DISCONTINUED | OUTPATIENT
Start: 2022-12-07 | End: 2022-12-08 | Stop reason: HOSPADM

## 2022-12-07 RX ORDER — MYCOPHENOLATE MOFETIL 250 MG/1
500 CAPSULE ORAL 2 TIMES DAILY
Status: DISCONTINUED | OUTPATIENT
Start: 2022-12-07 | End: 2022-12-08 | Stop reason: HOSPADM

## 2022-12-07 RX ORDER — AMLODIPINE BESYLATE 5 MG/1
5 TABLET ORAL DAILY
Status: DISCONTINUED | OUTPATIENT
Start: 2022-12-07 | End: 2022-12-08 | Stop reason: HOSPADM

## 2022-12-07 RX ADMIN — OSELTAMIVIR PHOSPHATE 75 MG: 75 CAPSULE ORAL at 10:20

## 2022-12-07 RX ADMIN — OSELTAMIVIR PHOSPHATE 75 MG: 75 CAPSULE ORAL at 21:32

## 2022-12-07 RX ADMIN — AMLODIPINE BESYLATE 5 MG: 5 TABLET ORAL at 11:35

## 2022-12-07 RX ADMIN — MORPHINE SULFATE 4 MG: 4 INJECTION, SOLUTION INTRAMUSCULAR; INTRAVENOUS at 00:08

## 2022-12-07 RX ADMIN — ENOXAPARIN SODIUM 30 MG: 100 INJECTION SUBCUTANEOUS at 21:33

## 2022-12-07 RX ADMIN — FAMOTIDINE 40 MG: 20 TABLET, FILM COATED ORAL at 11:35

## 2022-12-07 RX ADMIN — OXYCODONE AND ACETAMINOPHEN 1 TABLET: 5; 325 TABLET ORAL at 14:57

## 2022-12-07 RX ADMIN — MORPHINE SULFATE 4 MG: 4 INJECTION, SOLUTION INTRAMUSCULAR; INTRAVENOUS at 03:29

## 2022-12-07 RX ADMIN — ONDANSETRON 4 MG: 2 INJECTION INTRAMUSCULAR; INTRAVENOUS at 00:07

## 2022-12-07 RX ADMIN — ENOXAPARIN SODIUM 30 MG: 100 INJECTION SUBCUTANEOUS at 11:35

## 2022-12-07 RX ADMIN — TACROLIMUS 3 MG: 1 CAPSULE ORAL at 17:10

## 2022-12-07 RX ADMIN — AZITHROMYCIN MONOHYDRATE 500 MG: 250 TABLET ORAL at 11:35

## 2022-12-07 RX ADMIN — PREDNISONE 5 MG: 10 TABLET ORAL at 17:09

## 2022-12-07 RX ADMIN — SODIUM CHLORIDE, PRESERVATIVE FREE 10 ML: 5 INJECTION INTRAVENOUS at 21:33

## 2022-12-07 RX ADMIN — CEFTRIAXONE SODIUM 1 G: 1 INJECTION, POWDER, FOR SOLUTION INTRAMUSCULAR; INTRAVENOUS at 11:35

## 2022-12-07 NOTE — ED NOTES
TRANSFER - OUT REPORT:    Verbal report given to Dom Foy (name) on Peralesaddis Jimenez  being transferred to [facility](unit) for routine progression of care       Report consisted of patients Situation, Background, Assessment and   Recommendations(SBAR). Information from the following report(s) SBAR was reviewed with the receiving nurse. Lines:   Peripheral IV 12/06/22 Left Forearm (Active)        Opportunity for questions and clarification was provided.       Patient transported with:   [address] [place] [address]

## 2022-12-07 NOTE — ED NOTES
Verbal shift change report given to [facility], [address] [place] [address]  (oncoming nurse) by Rachel Ross RN  (offgoing nurse). Report included the following information SBAR, ED Summary, Intake/Output, MAR, Recent Results, and Cardiac Rhythm NSR .

## 2022-12-07 NOTE — PROGRESS NOTES
Pharmacist Review and Automatic Dose Adjustment of Prophylactic Enoxaparin    *Review reason for admission/hospital problem list*    The reviewing pharmacist has made an adjustment to the ordered enoxaparin dose or converted to UFH per the approved [facility] protocol and table as identified below. Jaziel Mar is a 40 y.o. female. No lab exists for component: CREATININE    Estimated Creatinine Clearance: 102.3 mL/min (based on SCr of 0.93 mg/dL).     Height:   Ht Readings from Last 1 Encounters:   09/29/22 165.1 cm (65\")     Weight:  Wt Readings from Last 1 Encounters:   12/07/22 108.3 kg (238 lb 12.1 oz)               Plan: Based upon the patient's weight and renal function, the ordered enoxaparin dose of 40mg q24h has been changed/converted to 30mg q 12 h       Thank you,  Rajat Milton, [address]

## 2022-12-07 NOTE — CONSULTS
Seen and examined  Thanks for the consult  A.p:  CKTx in 3.2022 ,creat at baseline as per her  Hx of ESRD via AVF  Immunos : Myfortic 180/180                    Pred  5                      Tac 3/3 mg  Off Bactrim  SOB,pos flu,suspicion of PNA    Resume immunos(MMF instead of Myfortic view availability)  Monitor Tac level also while on zithromax  Decrease MMF if getting sicker  At risk for opportunistic infection   Discussed with her The patient is a 61y Female complaining of

## 2022-12-07 NOTE — PROGRESS NOTES
Calcineurin Inhibitor Drug Monitoring  Consult received for pharmacist assistance in tacrolimus monitoring. Patient s/p kidney transplant on 3/22/22. Goal tac trough 8-10 ng/ml. Appears she was on tac 4 mg q12h as of 11/30/22 transplant office visit documentation. Started on tacrolimus 3 mg q12h by nephrology on admission. . Noted tacrolimus level ordered for midnight. Will change timing to be a true trough. Patient was started on azithromycin 500 mg daily which has shown to dramatically increase tacrolimus serum drug levels in a matter of days in case reports. Will follow tacrolimus troughs along with nephrology.

## 2022-12-07 NOTE — H&P
[address] [facility] Adult  Hospitalist Group  History and Physical    Date of Service:  12/7/2022  Primary Care Provider: Lior Rutherford NP  Source of information: The patient and Chart review    Chief Complaint: Cough      History of Presenting Illness:   Moon Garcia is a 40 y.o. female who presents with a PMHx of SLE, HTN, HLD, ESRD s/p Renal transplant 3/2022. She presents with a chief complaint of cough, wish associated headache, rhinorrhea and chest tightness that began about 2 days ago. She states that she was babysitting for a child that has the flu, over the weekend prior to developing her symptoms. She denies chest pain, shortness of breath, leg swelling, abdominal pain, N/V/D or any urinary symptoms    Notably her BNP was elevated at outside ED and her CXR was concerning for pulmonary vascular congestion, and diffuse interstitial prominence with Kerley B lines. She has no known cardiac hx     REVIEW OF SYSTEMS:  A comprehensive review of systems was negative except for that written in the History of Present Illness. Past Medical History:   Diagnosis Date    Anemia     secondary to lupus    Asthma     no inhaler use in past 2 to 3 years    Carditis     Chronic kidney disease     ESRD    Chronic pain     DDD (degenerative disc disease), lumbar     ESRD (end stage renal disease) ([address]     Fibroid tumor     GERD (gastroesophageal reflux disease)     HCAP (healthcare-associated pneumonia) 7/16/2019    Hemodialysis patient ([address] 12/21/2017    [address]  Tuesday,  Thursday,  and Saturday.      Hypercholesterolemia     Hyperkalemia 3/4/2019    Hypertension     Hypokalemia 10/27/2017    Intra-abdominal abscess ([address] 5/12/2018    Intractable nausea and vomiting 10/21/2015    Long term (current) use of anticoagulants     Lupus (systemic lupus erythematosus) ([address]     Malignant hypertension with chronic kidney disease stage V ([address]     Peritoneal dialysis status ([address] 10/2015    x 2 years Stopped 2017 due to infection and removed. Peritonitis ([facility] 3/11/2018    Pneumonia 3/14/2020    Poor historian 2018    With medications    Sepsis ([facility] 2018    Thromboembolus ([facility] 2013    lungs    Transfusion history     Last Transfusion 2017  at [facility]      Past Surgical History:   Procedure Laterality Date    HX  SECTION  11/2006    x1    HX OTHER SURGICAL  2015    INSERTION PD CATH; Removed 2017    HX RENAL TRANSPLANT  2022    HX VASCULAR ACCESS Right 2017    Double-Lumen henry catheter upper chest    HX VASCULAR ACCESS Right 2018    right upper arm     Prior to Admission medications    Medication Sig Start Date End Date Taking? Authorizing Provider   oxyCODONE-acetaminophen (PERCOCET) 5-325 mg per tablet Take 1 Tablet by mouth every eight (8) hours as needed for Pain for up to 14 days. Max Daily Amount: 3 Tablets. 22  Tara Leigh MD   sucralfate (CARAFATE) 1 gram tablet Take 1 Tablet by mouth four (4) times daily. 22   Poncho Fritz DO   amitriptyline (ELAVIL) 50 mg tablet Take one to two tablets at bedtime. For chronic insomnia. 90 day prescription. 22   Chacorta Mccarthy MD   amLODIPine (NORVASC) 5 mg tablet Take 5 mg by mouth daily. Darnell Franks MD   tacrolimus (PROGRAF) 1 mg capsule Take 1 mg by mouth every twelve (12) hours. Darnell Franks MD   mycophenolate sodium (MYFORTIC) 360 mg delayed release tablet Take 180 mg by mouth two (2) times a day. Darnell Franks MD   sennosides (Senna) 8.6 mg cap Take 2 Tablets by mouth. Darnell Franks MD   famotidine (PEPCID) 40 mg tablet Take 40 mg by mouth daily.     Darnell Franks MD   norethindrone acetate (AYGESTIN) 5 mg tablet TAKE 1 TABLET BY MOUTH DAILY 21   Tara Leigh MD   norethindrone acetate (AYGESTIN) 5 mg tablet TAKE 1 TABLET BY MOUTH DAILY 21   Tara Leigh MD   ondansetron (Zofran ODT) 4 mg disintegrating tablet Take 1 Tab by mouth every eight (8) hours as needed for Nausea. 3/28/20   Servando Samuels DO   carvedilol (COREG) 25 mg tablet Take 1 Tab by mouth two (2) times daily (with meals). 8/19/18   Lio Cueto MD   predniSONE (DELTASONE) 5 mg tablet Take 2 Tabs by mouth daily. 6/28/18   Aurora Lai MD     Allergies   Allergen Reactions    Compazine [Prochlorperazine Edisylate] Nausea and Vomiting and Palpitations     \"hot and lightheaded\"      Family History   Problem Relation Age of Onset    Diabetes Father     Hypertension Father     Cancer Other         aunt with breast cancerX2    Diabetes Mother       Social History:  reports that she has never smoked. She has never used smokeless tobacco. She reports current drug use. Drugs: Prescription and OTC. She reports that she does not drink alcohol. Family and social history were personally reviewed, all pertinent and relevant details are outlined as above. Objective:   Visit Vitals  /78 (BP 1 Location: Left upper arm, BP Patient Position: Sitting)   Pulse 95   Temp 97.8 °F (36.6 °C)   Resp 16   Wt 108.3 kg (238 lb 12.1 oz)   SpO2 90%   Breastfeeding No   BMI 39.73 kg/m²      O2 Device: None (Room air)    PHYSICAL EXAM:   General: Alert x oriented x 3, awake, no acute distress, resting in bed, pleasant HEENT: PEERL, EOMI, moist mucus membranes  Neck: Supple, no JVD, no meningeal signs  Chest: Crackles auscultated in right sided lung fields, no accessory muscle use  CVS: RRR, S1 S2 heard, no murmurs/rubs/gallops  Abd: Soft, non-tender, non-distended, +bowel sounds   Ext: No clubbing, no cyanosis, no edema  Neuro/Psych: Pleasant mood and affect, CN 2-12 grossly intact, sensory grossly within normal limit, Strength 5/5 in all extremities  Cap refill: Brisk, less than 3 seconds  Pulses: 2+, symmetric in all extremities  Skin: Warm, dry, without rashes or lesions    Data Review: All diagnostic labs and studies have been reviewed.     Abnormal Labs Reviewed CBC WITH AUTOMATED DIFF - Abnormal; Notable for the following components:       Result Value    NEUTROPHILS 80 (*)     LYMPHOCYTES 9 (*)     ABS. LYMPHOCYTES 0.6 (*)     All other components within normal limits   METABOLIC PANEL, BASIC - Abnormal; Notable for the following components:    Sodium 134 (*)     Glucose 128 (*)     All other components within normal limits   NT-PRO BNP - Abnormal; Notable for the following components:    NT pro- (*)     All other components within normal limits   INFLUENZA A+B VIRAL AGS - Abnormal; Notable for the following components:    Influenza A Antigen Positive (*)     All other components within normal limits       All Micro Results       Procedure Component Value Units Date/Time    LEGIONELLA PNEUMOPHILA AG, URINE [[id number]]     Order Status: Sent Specimen: Urine     S. Erica Dutton, UR/CSF [[id number]]     Order Status: Sent     LEGIONELLA PNEUMOPHILA AG, URINE [[id number]]     Order Status: Canceled Specimen: Urine     S. PNEUMO AG, UR/CSF [[id number]]     Order Status: Canceled             IMAGING:   XR CHEST PA LAT   Final Result   Atypical interstitial infiltrate versus congestive failure and   interstitial pulmonary edema. ECG/ECHO:    Results for orders placed or performed during the hospital encounter of 12/06/22   EKG, 12 LEAD, INITIAL   Result Value Ref Range    Ventricular Rate 94 BPM    Atrial Rate 94 BPM    P-R Interval 156 ms    QRS Duration 84 ms    Q-T Interval 350 ms    QTC Calculation (Bezet) 437 ms    Calculated P Axis 56 degrees    Calculated R Axis 50 degrees    Calculated T Axis 40 degrees    Diagnosis       Normal sinus rhythm    When compared with ECG of 29-SEP-2022 20:44,  No significant change was found  Confirmed by Waqar Hernández MD. ([id number]) on 12/6/2022 3:13:46 PM       *Note: Due to a large number of results and/or encounters for the requested time period, some results have not been displayed.  A complete set of results can be found in Results Review. Assessment:   Given the patient's current clinical presentation, there is a high level of concern for decompensation if discharged from the emergency department. Complex decision making was performed, which includes reviewing the patient's available past medical records, laboratory results, and imaging studies. Active Problems:    SOB (shortness of breath) (12/6/2022)      Plan:     Influenza  Cough  - Pt with 2 days of cough, wheezing, chest tightness, aches  - CXR:There is pulmonary vascular congestion and diffuse interstitial prominence with Kerley B lines. The lungs are otherwise clear with no pleural effusion  - Will start tamiflu, and being CAP coverage with azithromycin and ceftriaxone, given patient is on chronic immunosupressants  - PRN nebs, guaifenesin   - PRN tylenol  - ordered legionella ag, S.pneumo  - Holding off on blood cultures as patient is not currently showings signs of SIRS    Elevated BNP  - Pt has no known hx of heart failure, CXR concerning for pulmonary vascular congestion  - Troponin negative  - BNP: 736  - Echo ordered  - Will hold off on diuresis for now  - Consider consult to cardiology pending echo results    CKD, S/P transplant  - Consult placed to nephrology to assist in management of immunosuppressants  - On tacrolimus, mycophenolate, prednisone at home   - Avoid nephrotoxic agents  - Follow daily BMP    HTN  - Pt has been normotensive, will hold home BP meds for now  - Closely monitor BP    GERD:  - Continue famotidine    DIET: ADULT DIET Regular   ISOLATION PRECAUTIONS: Droplet, Droplet Plus  CODE STATUS: Full Code   DVT PROPHYLAXIS: Lovenox  FUNCTIONAL STATUS PRIOR TO HOSPITALIZATION: Fully active and ambulatory; able to carry on all self-care without restriction.   Ambulatory status/function: By self   EARLY MOBILITY ASSESSMENT: Recommend routine ambulation while hospitalized with the assistance of nursing staff  ANTICIPATED DISCHARGE: 24-48 hours.  ANTICIPATED DISPOSITION: Home  EMERGENCY CONTACT/SURROGATE DECISION MAKER: Inez Lozoya (Mother)   [phone] (Mobile)      Signed By: Patricia Capps PA-C     December 7, 2022         Please note that this dictation may have been completed with Awilda Knapp, the computer voice recognition software. Quite often unanticipated grammatical, syntax, homophones, and other interpretive errors are inadvertently transcribed by the computer software. Please disregard these errors. Please excuse any errors that have escaped final proofreading.

## 2022-12-07 NOTE — ED NOTES
Patient ambulatory to and from restroom without need for assistance or incident. Patient continues to complain of body aches/ pain. MD informed.

## 2022-12-07 NOTE — ED NOTES
Contacted [facility] for transport to [facility].  [facility] will call RN when they finish current transport

## 2022-12-07 NOTE — PROGRESS NOTES
Received consult for renal management, s/p kidney transplant. Patient has been followed by Dr Jassi Mahmood. Will change consult to their group.

## 2022-12-07 NOTE — ED NOTES
Contacted [facility] to arrange transport to [facility].  [facility] states they have to call back with ETA

## 2022-12-08 ENCOUNTER — APPOINTMENT (OUTPATIENT)
Dept: NON INVASIVE DIAGNOSTICS | Age: 37
DRG: 194 | End: 2022-12-08
Attending: PHYSICIAN ASSISTANT
Payer: MEDICARE

## 2022-12-08 VITALS
HEIGHT: 65 IN | OXYGEN SATURATION: 98 % | HEART RATE: 95 BPM | RESPIRATION RATE: 19 BRPM | BODY MASS INDEX: 39.32 KG/M2 | WEIGHT: 236 LBS | SYSTOLIC BLOOD PRESSURE: 119 MMHG | TEMPERATURE: 97.8 F | DIASTOLIC BLOOD PRESSURE: 90 MMHG

## 2022-12-08 LAB
ALBUMIN SERPL-MCNC: 2.9 G/DL (ref 3.5–5)
ALBUMIN/GLOB SERPL: 0.9 {RATIO} (ref 1.1–2.2)
ALP SERPL-CCNC: 80 U/L (ref 45–117)
ALT SERPL-CCNC: 25 U/L (ref 12–78)
ANION GAP SERPL CALC-SCNC: 6 MMOL/L (ref 5–15)
AST SERPL-CCNC: 15 U/L (ref 15–37)
BASOPHILS # BLD: 0 K/UL (ref 0–0.1)
BASOPHILS NFR BLD: 0 % (ref 0–1)
BILIRUB SERPL-MCNC: 0.2 MG/DL (ref 0.2–1)
BUN SERPL-MCNC: 10 MG/DL (ref 6–20)
BUN/CREAT SERPL: 13 (ref 12–20)
CALCIUM SERPL-MCNC: 8.8 MG/DL (ref 8.5–10.1)
CHLORIDE SERPL-SCNC: 109 MMOL/L (ref 97–108)
CO2 SERPL-SCNC: 23 MMOL/L (ref 21–32)
CREAT SERPL-MCNC: 0.8 MG/DL (ref 0.55–1.02)
DIFFERENTIAL METHOD BLD: ABNORMAL
EOSINOPHIL # BLD: 0.1 K/UL (ref 0–0.4)
EOSINOPHIL NFR BLD: 3 % (ref 0–7)
ERYTHROCYTE [DISTWIDTH] IN BLOOD BY AUTOMATED COUNT: 12.7 % (ref 11.5–14.5)
GLOBULIN SER CALC-MCNC: 3.3 G/DL (ref 2–4)
GLUCOSE SERPL-MCNC: 98 MG/DL (ref 65–100)
HCT VFR BLD AUTO: 36.8 % (ref 35–47)
HGB BLD-MCNC: 11.8 G/DL (ref 11.5–16)
IMM GRANULOCYTES # BLD AUTO: 0 K/UL (ref 0–0.04)
IMM GRANULOCYTES NFR BLD AUTO: 1 % (ref 0–0.5)
LYMPHOCYTES # BLD: 0.7 K/UL (ref 0.8–3.5)
LYMPHOCYTES NFR BLD: 19 % (ref 12–49)
MAGNESIUM SERPL-MCNC: 1.8 MG/DL (ref 1.6–2.4)
MCH RBC QN AUTO: 28.4 PG (ref 26–34)
MCHC RBC AUTO-ENTMCNC: 32.1 G/DL (ref 30–36.5)
MCV RBC AUTO: 88.5 FL (ref 80–99)
MONOCYTES # BLD: 0.3 K/UL (ref 0–1)
MONOCYTES NFR BLD: 7 % (ref 5–13)
NEUTS SEG # BLD: 2.7 K/UL (ref 1.8–8)
NEUTS SEG NFR BLD: 70 % (ref 32–75)
NRBC # BLD: 0 K/UL (ref 0–0.01)
NRBC BLD-RTO: 0 PER 100 WBC
PHOSPHATE SERPL-MCNC: 3.2 MG/DL (ref 2.6–4.7)
PLATELET # BLD AUTO: 162 K/UL (ref 150–400)
PMV BLD AUTO: 10.1 FL (ref 8.9–12.9)
POTASSIUM SERPL-SCNC: 4.4 MMOL/L (ref 3.5–5.1)
PROT SERPL-MCNC: 6.2 G/DL (ref 6.4–8.2)
RBC # BLD AUTO: 4.16 M/UL (ref 3.8–5.2)
RBC MORPH BLD: ABNORMAL
SODIUM SERPL-SCNC: 138 MMOL/L (ref 136–145)
TACROLIMUS, UTACRT: 3.5 NG/ML
WBC # BLD AUTO: 3.8 K/UL (ref 3.6–11)

## 2022-12-08 PROCEDURE — 36415 COLL VENOUS BLD VENIPUNCTURE: CPT

## 2022-12-08 PROCEDURE — 74011250636 HC RX REV CODE- 250/636: Performed by: INTERNAL MEDICINE

## 2022-12-08 PROCEDURE — 74011000250 HC RX REV CODE- 250: Performed by: PHYSICIAN ASSISTANT

## 2022-12-08 PROCEDURE — 80053 COMPREHEN METABOLIC PANEL: CPT

## 2022-12-08 PROCEDURE — 83735 ASSAY OF MAGNESIUM: CPT

## 2022-12-08 PROCEDURE — 74011250636 HC RX REV CODE- 250/636: Performed by: PHYSICIAN ASSISTANT

## 2022-12-08 PROCEDURE — 85025 COMPLETE CBC W/AUTO DIFF WBC: CPT

## 2022-12-08 PROCEDURE — 80197 ASSAY OF TACROLIMUS: CPT

## 2022-12-08 PROCEDURE — 84100 ASSAY OF PHOSPHORUS: CPT

## 2022-12-08 PROCEDURE — 74011250637 HC RX REV CODE- 250/637: Performed by: PHYSICIAN ASSISTANT

## 2022-12-08 PROCEDURE — 93306 TTE W/DOPPLER COMPLETE: CPT

## 2022-12-08 RX ORDER — OSELTAMIVIR PHOSPHATE 75 MG/1
75 CAPSULE ORAL 2 TIMES DAILY
Qty: 7 CAPSULE | Refills: 0 | Status: SHIPPED | OUTPATIENT
Start: 2022-12-08 | End: 2022-12-12

## 2022-12-08 RX ORDER — TACROLIMUS 1 MG/1
4 CAPSULE ORAL EVERY 12 HOURS
Status: DISCONTINUED | OUTPATIENT
Start: 2022-12-08 | End: 2022-12-08 | Stop reason: HOSPADM

## 2022-12-08 RX ADMIN — OXYCODONE AND ACETAMINOPHEN 1 TABLET: 5; 325 TABLET ORAL at 11:56

## 2022-12-08 RX ADMIN — CEFTRIAXONE SODIUM 1 G: 1 INJECTION, POWDER, FOR SOLUTION INTRAMUSCULAR; INTRAVENOUS at 11:45

## 2022-12-08 RX ADMIN — FAMOTIDINE 40 MG: 20 TABLET, FILM COATED ORAL at 09:46

## 2022-12-08 RX ADMIN — AMLODIPINE BESYLATE 5 MG: 5 TABLET ORAL at 09:46

## 2022-12-08 RX ADMIN — TACROLIMUS 3 MG: 1 CAPSULE ORAL at 05:54

## 2022-12-08 RX ADMIN — SODIUM CHLORIDE, PRESERVATIVE FREE 10 ML: 5 INJECTION INTRAVENOUS at 05:55

## 2022-12-08 RX ADMIN — OSELTAMIVIR PHOSPHATE 75 MG: 75 CAPSULE ORAL at 09:47

## 2022-12-08 RX ADMIN — AMITRIPTYLINE HYDROCHLORIDE 50 MG: 25 TABLET, FILM COATED ORAL at 00:05

## 2022-12-08 RX ADMIN — ENOXAPARIN SODIUM 30 MG: 100 INJECTION SUBCUTANEOUS at 09:46

## 2022-12-08 RX ADMIN — OXYCODONE AND ACETAMINOPHEN 1 TABLET: 5; 325 TABLET ORAL at 00:05

## 2022-12-08 RX ADMIN — AZITHROMYCIN MONOHYDRATE 500 MG: 250 TABLET ORAL at 09:46

## 2022-12-08 NOTE — DISCHARGE SUMMARY
Discharge Summary       PATIENT ID: Toshia Karimi  MRN: [id number]   YOB: 1985    DATE OF ADMISSION: 12/6/2022 11:36 AM    DATE OF DISCHARGE: 12/8/22   PRIMARY CARE PROVIDER: Georgina Cruz NP     ATTENDING PHYSICIAN: Thiago Burton  DISCHARGING PROVIDER: Clari Infante MD    To contact this individual call [phone] and ask the  to page. If unavailable ask to be transferred the Adult Hospitalist Department. CONSULTATIONS: IP CONSULT TO NEPHROLOGY  IP CONSULT TO NEPHROLOGY    PROCEDURES/SURGERIES: * No surgery found *    DISCHARGE DIAGNOSES:  FLU +    Toshia Karimi is a 40 y.o. female who presents with a PMHx of SLE, HTN, HLD, ESRD s/p Renal transplant 3/2022. She presents with a chief complaint of cough, wish associated headache, rhinorrhea and chest tightness that began about 2 days ago. She states that she was babysitting for a child that has the flu, over the weekend prior to developing her symptoms. She denies chest pain, shortness of breath, leg swelling, abdominal pain, N/V/D or any urinary symptoms     [address] [facility] COURSE:   Influenza Cough  - patient was given supportive care with cough medication and Tamiflu  -She is on room air feeling much better not requiring any oxygen no fever WBC normal no antibiotics required     Elevated BNP  - Pt has no known hx of heart failure, CXR concerning for pulmonary vascular congestion  - Troponin negativ     CKD, S/P transplant  - Consult placed to nephrology to assist in management of immunosuppressants  - On tacrolimus, mycophenolate, prednisone at home resume have follow-up at [address] [place] [address] on 13th     HTN  - Pt has been normotensive, will hold home BP meds for now  - Closely monitor BP     GERD:  - Continue famotidine    DISCHARGE DIAGNOSES / PLAN:      Discharge home    BMI: Body mass index is 39.27 kg/m². . This patient: Meets criteria for severe obesity given BMI >/= to 40 due to excess calories/nutritional. Weight loss and lifestyle modifications should be encouraged as an outpatient. PENDING TEST RESULTS:   At the time of discharge the following test results are still pending:      ADDITIONAL CARE RECOMMENDATIONS:        NOTIFY YOUR PHYSICIAN FOR ANY OF THE FOLLOWING:   Fever over 101 degrees for 24 hours. Chest pain, shortness of breath, fever, chills, nausea, vomiting, diarrhea, change in mentation, falling, weakness, bleeding. Severe pain or pain not relieved by medications, as well as any other signs or symptoms that you may have questions about. FOLLOW UP APPOINTMENTS:    Follow-up Information       Follow up With Specialties Details Why Contact Info    Tad Brumfield NP Nurse Practitioner Follow up in 1 week(s)  [address] [id number]                 DIET: Renal Diet    ACTIVITY: Activity as tolerated    EQUIPMENT needed:     DISCHARGE MEDICATIONS:  Current Discharge Medication List        START taking these medications    Details   oseltamivir (TAMIFLU) 75 mg capsule Take 1 Capsule by mouth two (2) times a day for 7 doses. Qty: 7 Capsule, Refills: 0  Start date: 12/8/2022, End date: 12/12/2022           CONTINUE these medications which have NOT CHANGED    Details   fluticasone propionate (FLONASE) 50 mcg/actuation nasal spray 1 [place] by Both Nostrils route as needed. magnesium oxide (MAG-OX) 400 mg tablet Take 400 mg by mouth two (2) times a day. pantoprazole (PROTONIX) 40 mg tablet Take 40 mg by mouth Daily (before breakfast). amitriptyline (ELAVIL) 150 mg tablet Take 150 mg by mouth nightly. oxyCODONE-acetaminophen (PERCOCET) 5-325 mg per tablet Take 1 Tablet by mouth every eight (8) hours as needed for Pain for up to 14 days. Max Daily Amount: 3 Tablets. Qty: 20 Tablet, Refills: 0    Associated Diagnoses: Pelvic pain; ESRD on hemodialysis (HCC)      amLODIPine (NORVASC) 5 mg tablet Take 10 mg by mouth daily. tacrolimus (PROGRAF) 1 mg capsule Take 4 mg by mouth every twelve (12) hours. mycophenolate sodium (MYFORTIC) 360 mg delayed release tablet Take 180 mg by mouth two (2) times a day. sennosides (Senna) 8.6 mg cap Take 2 Tablets by mouth. ondansetron (Zofran ODT) 4 mg disintegrating tablet Take 1 Tab by mouth every eight (8) hours as needed for Nausea. Qty: 15 Tab, Refills: 0      carvedilol (COREG) 25 mg tablet Take 1 Tab by mouth two (2) times daily (with meals). Qty: 60 Tab, Refills: 0      predniSONE (DELTASONE) 5 mg tablet Take 2 Tabs by mouth daily. Qty: 30 Tab, Refills: 0           STOP taking these medications       sucralfate (CARAFATE) 1 gram tablet Comments:   Reason for Stopping:         famotidine (PEPCID) 40 mg tablet Comments:   Reason for Stopping:         norethindrone acetate (AYGESTIN) 5 mg tablet Comments:   Reason for Stopping:         norethindrone acetate (AYGESTIN) 5 mg tablet Comments:   Reason for Stopping:               DISPOSITION:   x Home With:   OT  PT  HH  RN       Long term SNF/Inpatient Rehab    Independent/assisted living    Hospice    Other:       PATIENT CONDITION AT DISCHARGE:     Functional status    Poor     Deconditioned    x Independent      Cognition   x  Lucid     Forgetful     Dementia      Catheters/lines (plus indication)    Franco     PICC     PEG    x None      Code status    x Full code     DNR      PHYSICAL EXAMINATION AT DISCHARGE:  General:          Alert, cooperative, no distress, appears stated age. HEENT:           Atraumatic, anicteric sclerae, pink conjunctivae                          No oral ulcers, mucosa moist, throat clear, dentition fair  Neck:               Supple, symmetrical  Lungs:             Clear to auscultation bilaterally. No Wheezing or Rhonchi. No rales. Chest wall:      No tenderness  No Accessory muscle use. Heart:              Regular  rhythm,  No  murmur   No edema  Abdomen:        Soft, non-tender. Not distended. Bowel sounds normal  Extremities:     No cyanosis. No clubbing,                            Skin turgor normal, Capillary refill normal  Skin:                Not pale. Not Jaundiced  No rashes   Psych:             Not anxious or agitated.   Neurologic:      Alert, moves all extremities, answers questions appropriately and responds to commands       [address]:  Problem List as of 12/8/2022 Date Reviewed: 10/25/2021            Codes Class Noted - Resolved    SOB (shortness of breath) ICD-10-CM: R06.02  ICD-9-CM: 786.05  12/6/2022 - Present        Respiratory failure ([facility] ICD-10-CM: J96.90  ICD-9-CM: 518.81  3/20/2022 - Present        Chronic pain ICD-10-CM: G89.29  ICD-9-CM: 338.29  Unknown - Present        Opioid use, unspecified with unspecified opioid-induced disorder ICD-10-CM: F11.99  ICD-9-CM: 292.9, 305.50  8/10/2021 - Present        Opioid dependence with unspecified opioid-induced disorder ICD-10-CM: F11.29  ICD-9-CM: 292.9, 304.00  8/10/2021 - Present        Opioid dependence, uncomplicated DHE-64-EK: W73.70  ICD-9-CM: 304.00  8/10/2021 - Present        Pelvic pain ICD-10-CM: R10.2  ICD-9-CM: [place]  5/27/2021 - Present        Dysmenorrhea ICD-10-CM: N94.6  ICD-9-CM: 625.3  5/20/2021 - Present        Pelvic mass ICD-10-CM: R19.00  ICD-9-CM: 789.30  3/16/2021 - Present        Severe obesity ([facility] ICD-10-CM: E66.01  ICD-9-CM: 278.01  11/19/2020 - Present        Seizure ([facility] ICD-10-CM: R56.9  ICD-9-CM: 780.39  10/18/2019 - Present        ESRD on hemodialysis ([facility] ICD-10-CM: N18.6, Z99.2  ICD-9-CM: 585.6, V45.11  8/27/2018 - Present        Intra-abdominal adhesions ICD-10-CM: K66.0  ICD-9-CM: 568.0  5/12/2018 - Present        Other constipation ICD-10-CM: K59.09  ICD-9-CM: 564.09  4/4/2018 - Present        History of pulmonary embolism ICD-10-CM: Q27.322  ICD-9-CM: V12.55  12/8/2017 - Present        Anemia of renal disease ICD-10-CM: N18.9, D63.1  ICD-9-CM: 285.21  2/21/2017 - Present ACP (advance care planning) ICD-10-CM: Z71.89  ICD-9-CM: V65.49  2/21/2017 - Present        Malignant hypertension ICD-10-CM: I10  ICD-9-CM: 401.0  7/11/2016 - Present        Lupus nephritis ([facility] ICD-10-CM: M32.14  ICD-9-CM: 710.0, 583.81  3/10/2012 - Present        RESOLVED: Respiratory failure ([facility] ICD-10-CM: J96.90  ICD-9-CM: 518.81  3/20/2022 - 3/20/2022        RESOLVED: Pneumonia ICD-10-CM: J18.9  ICD-9-CM: 825  3/14/2020 - 11/19/2020        RESOLVED: Emesis, persistent ICD-10-CM: R11.15  ICD-9-CM: 536.2  8/11/2019 - 8/14/2019        RESOLVED: SBO (small bowel obstruction) ([facility] ICD-10-CM: B91.025  ICD-9-CM: 560.9  7/31/2019 - 8/14/2019        RESOLVED: HCAP (healthcare-associated pneumonia) ICD-10-CM: J18.9  ICD-9-CM: 915  7/16/2019 - 11/19/2020        RESOLVED: Hyperkalemia ICD-10-CM: E87.5  ICD-9-CM: 276.7  3/4/2019 - 11/19/2020        RESOLVED: Neutropenia ([facility] ICD-10-CM: D70.9  ICD-9-CM: 288.00  9/15/2018 - 9/19/2018        RESOLVED: Anemia in chronic kidney disease ICD-10-CM: N18.9, D63.1  ICD-9-CM: 285.21  9/15/2018 - 9/19/2018        RESOLVED: Malignant hypertensive urgency ICD-10-CM: I16.0  ICD-9-CM: 401.0  9/10/2018 - 9/19/2018        RESOLVED: Hypertensive urgency ICD-10-CM: I16.0  ICD-9-CM: 401.9  7/19/2018 - 7/22/2018        RESOLVED: Sepsis ([facility] ICD-10-CM: A41.9  ICD-9-CM: 038.9, 995.91  4/29/2018 - 11/19/2020        RESOLVED: Peritonitis ([facility] ICD-10-CM: K65.9  ICD-9-CM: 567.9  3/11/2018 - 11/19/2020        RESOLVED: Sepsis ([facility] ICD-10-CM: A41.9  ICD-9-CM: 038.9, 995.91  12/12/2017 - 12/22/2017        RESOLVED: Hypokalemia ICD-10-CM: E87.6  ICD-9-CM: 276.8  10/27/2017 - 11/19/2020        RESOLVED: Pneumonia ICD-10-CM: J18.9  ICD-9-CM: 486  10/14/2017 - 12/8/2017        RESOLVED: Pleural effusion, left ICD-10-CM: J90  ICD-9-CM: 511.9  10/14/2017 - 12/8/2017        RESOLVED: ESRD on peritoneal dialysis ([facility] (Chronic) ICD-10-CM: N18.6, Z99.2  ICD-9-CM: 585.6, V45.11  10/7/2016 - 8/27/2018 RESOLVED: Idiopathic chronic inflammatory bowel disease ICD-10-CM: K52.9  ICD-9-CM: 558.9  8/28/2013 - 8/28/2013        RESOLVED: Abdominal pain ICD-10-CM: R10.9  ICD-9-CM: 789.00  8/28/2013 - 9/3/2013        RESOLVED: Hypoxia ICD-10-CM: R09.02  ICD-9-CM: 799.02  4/25/2013 - 4/30/2013        RESOLVED: Lupus nephritis ([facility] ICD-10-CM: M32.14  ICD-9-CM: 710.0, 583.81  4/27/2012 - 4/28/2012           Greater than 30  minutes were spent with the patient on counseling and coordination of care    Signed:   Placido Yanes MD  12/8/2022  1:18 PM

## 2022-12-08 NOTE — CONSULTS
[address]    Name:  Pratima Sena  MR#:  [id number]  :  1985  ACCOUNT #:  [de-identified]  DATE OF SERVICE:  2022    REASON FOR CONSULTATION:  Seen for renal transplant. Thanks for the consult. HISTORY OF PRESENT ILLNESS:  The patient is a very pleasant lady. She had end-stage kidney disease until 2022, ended up with cadaveric kidney transplant at [address] [place] [address]. Her creatinine now is 0.9. She said it was uneventful. No rejection. No need for dialysis after the kidney transplant. Came here because she has been having shortness of breath and x-ray showed some infiltrate. Blood pressure doing okay; a little bit tachycardic on admission, now it is better; and her pulse oxygen is acceptable, 90-96% on room air. PAST MEDICAL HISTORY:  1. History of end-stage renal disease, hemodialysis before via AV fistula. 2.  AV fistula. 3.  Hypertension. 4.  Cadaveric kidney transplant at [address] [place] [address] in 2022.  5.  Asthma. 6.  History of pneumonia. 7.  PE.    SOCIAL HISTORY:  Reviewed. FAMILY HISTORY:  Reviewed. REVIEW OF SYSTEMS:  Look at the HPI, rest is negative. MEDICATIONS:  As inpatient include:  1. Elavil. 2.  Norvasc. 3.  Zithromax. 4.  Coreg. 5.  Rocephin. 6.  Tamiflu. ALLERGIES:  COMPAZINE. PHYSICAL EXAMINATION:  GENERAL:  Not in distress, very comfortable. VITAL SIGNS:  /82. NECK:  JVD is difficult to assess. ABDOMEN:  Soft. EXTREMITIES:  No edema. AV fistula, right upper extremity. Patent pulses. LABORATORY DATA:  Sodium 134, potassium 5.1, BUN is 11, creatinine 0.9, calcium is 9.2. Albumin 3.8. Hemoglobin 13.1, platelets are 582, WBC is 6.8. RADIOLOGY:  Chest x-ray, some atypical interstitial infiltrate versus CHF. IMPRESSION:  1. End-stage kidney disease, was on hemodialysis. 2.  Cadaveric kidney transplant in 2022. Creatinine now 0.9. She said it is close to baseline.   3.     take Myfortic 180 twice a day, tacrolimus 3 mg q.12, and prednisone 5 mg, and now off Bactrim and off cytomegalovirus prophylaxis. 4.  Shortness of breath. Suspicion of pneumonia. 5.  Low immune system. RECOMMENDATIONS:  1. We will start with tacrolimus at the dose of 3 mg q.12 p.o.  2.  Cellcept 500 twice a day since we do not carry Myfortic and we can decrease this dose if she got sicker. 3.  At risk for atypical infections, low immune system. 4.  Resume also prednisone. 5.  Tacrolimus level tomorrow. 6.  Discussed at length with her.       MD ROHITH [email or web address]/[email or web address]/[id number]  D:  12/07/2022 14:46  T:  12/07/2022 21:13  JOB #:  [id number]

## 2022-12-08 NOTE — PROGRESS NOTES
I have reviewed discharge instructions with the patient. The patient verbalized understanding. Current Discharge Medication List        START taking these medications    Details   oseltamivir (TAMIFLU) 75 mg capsule Take 1 Capsule by mouth two (2) times a day for 7 doses. Qty: 7 Capsule, Refills: 0  Start date: 12/8/2022, End date: 12/12/2022           CONTINUE these medications which have NOT CHANGED    Details   fluticasone propionate (FLONASE) 50 mcg/actuation nasal spray 1 [place] by Both Nostrils route as needed. magnesium oxide (MAG-OX) 400 mg tablet Take 400 mg by mouth two (2) times a day. pantoprazole (PROTONIX) 40 mg tablet Take 40 mg by mouth Daily (before breakfast). amitriptyline (ELAVIL) 150 mg tablet Take 150 mg by mouth nightly. oxyCODONE-acetaminophen (PERCOCET) 5-325 mg per tablet Take 1 Tablet by mouth every eight (8) hours as needed for Pain for up to 14 days. Max Daily Amount: 3 Tablets. Qty: 20 Tablet, Refills: 0    Associated Diagnoses: Pelvic pain; ESRD on hemodialysis (HCC)      amLODIPine (NORVASC) 5 mg tablet Take 10 mg by mouth daily. tacrolimus (PROGRAF) 1 mg capsule Take 4 mg by mouth every twelve (12) hours. mycophenolate sodium (MYFORTIC) 360 mg delayed release tablet Take 180 mg by mouth two (2) times a day. sennosides (Senna) 8.6 mg cap Take 2 Tablets by mouth. ondansetron (Zofran ODT) 4 mg disintegrating tablet Take 1 Tab by mouth every eight (8) hours as needed for Nausea. Qty: 15 Tab, Refills: 0      carvedilol (COREG) 25 mg tablet Take 1 Tab by mouth two (2) times daily (with meals). Qty: 60 Tab, Refills: 0      predniSONE (DELTASONE) 5 mg tablet Take 2 Tabs by mouth daily.   Qty: 30 Tab, Refills: 0           STOP taking these medications       sucralfate (CARAFATE) 1 gram tablet Comments:   Reason for Stopping:         famotidine (PEPCID) 40 mg tablet Comments:   Reason for Stopping:         norethindrone acetate (AYGESTIN) 5 mg tablet Comments:   Reason for Stopping:         norethindrone acetate (AYGESTIN) 5 mg tablet Comments:   Reason for Stopping:

## 2022-12-08 NOTE — PROGRESS NOTES
2000 Received patient from  [place], [place].    9143 Bedside shift change report given to Humble Segovia RN (oncoming nurse) by Maranda Bailey RN. Report included the following information SBAR, Kardex, MAR, Recent Results, and Cardiac Rhythm NSR. Problem: Risk for Spread of Infection  Goal: Prevent transmission of infectious organism to others  Description: Prevent the transmission of infectious organisms to other patients, staff members, and visitors. Outcome: Progressing Towards Goal     Problem: Falls - Risk of  Goal: *Absence of Falls  Description: Document Dennie Oak Fall Risk and appropriate interventions in the flowsheet.   Outcome: Progressing Towards Goal  Note: Fall Risk Interventions:            Medication Interventions: Patient to call before getting OOB

## 2022-12-08 NOTE — PROGRESS NOTES
Transition of Care Plan    RUR: 10%  DISPOSITION: home with family   F/U with PCPS/Specialist  Transport: mother or father     Reason for Admission:  cough                  RUR Score:      10%               Plan for utilizing home health:      no current plan     PCP: First and Last name:  Jewell Koo NP     Name of Practice:    Are you a current patient: Yes/No: yes   Approximate date of last visit: In the process of getting a new provider at the practice    Can you participate in a virtual visit with your PCP:                     Current Advanced Directive/Advance Care Plan: Full Code    Healthcare Decision Maker:   Click here to complete [address] including selection of the Healthcare Decision Maker Relationship (ie \"Primary\")           Pt refused to provide                   Transition of Care Plan:                      CM was unable to enter room due to isolation precautions. CM spoke with Pt on the phone. Living situation: Lives with parents and 12year old daughter   ADLs: independent   DME: walker   Previous IPR/SNF: none   Previous home health: [facility]   Demographics: Confirmed, insured VA Medicare and [facility] Medicaid   Pharmacy: [facility]   Main point of contact: Mother Radha Yost ([phone])    CM followed patient progress and assist as recommended with SURJIT plan. Care Management Interventions  PCP Verified by CM: Yes  Palliative Care Criteria Met (RRAT>21 & CHF Dx)?: No  Mode of Transport at Discharge:  Other (see comment) (parent)  Transition of Care Consult (CM Consult): Discharge Planning  MyChart Signup: Yes  Discharge Durable Medical Equipment: No  Health Maintenance Reviewed: Yes  Physical Therapy Consult: No  Occupational Therapy Consult: No  Speech Therapy Consult: No  Support Systems: Parent(s), Child(mary)  Confirm Follow Up Transport: Family  The Plan for Transition of Care is Related to the Following Treatment Goals : home  [employer] Provided?: No  Discharge Location  Patient Expects to be Discharged to[de-identified] Home    Medicare pt has received, reviewed, and signed 1st IM letter informing them of their right to appeal the discharge. Signed copied has been placed on pt bedside chart.     Daisha Cespedes   MSW Student Former smoker

## 2022-12-08 NOTE — PROGRESS NOTES
Bedside shift change report given to [address]  (oncoming nurse) by Leah Peña  (offgoing nurse). Report included the following information SBAR, Kardex, MAR, and Recent Results.

## 2022-12-08 NOTE — PROGRESS NOTES
RENAL  PROGRESS NOTE        Subjective:   Feels better  Objective:   VITALS SIGNS:    Visit Vitals  BP (!) 125/92 (BP 1 Location: Left upper arm, BP Patient Position: Sitting)   Pulse 91   Temp 98.5 °F (36.9 °C)   Resp 19   Wt 107.2 kg (236 lb 4.8 oz)   SpO2 93%   Breastfeeding No   BMI 39.32 kg/m²       O2 Device: None (Room air)       Temp (24hrs), Av.1 °F (36.7 °C), Min:97.5 °F (36.4 °C), Max:98.8 °F (37.1 °C)         PHYSICAL EXAM:  NAD    DATA REVIEW:     INTAKE / OUTPUT:   Last shift:      No intake/output data recorded.   Last 3 shifts:  190 -  07  In: 260 [I.V.:260]  Out: -     Intake/Output Summary (Last 24 hours) at 2022 1047  Last data filed at 2022  Gross per 24 hour   Intake 10 ml   Output --   Net 10 ml         LABS:   Recent Labs     22  0545 22  1446   WBC 3.8 6.8   HGB 11.8 13.1   HCT 36.8 41.5    215     Recent Labs     22  0545 22  1446    134*   K 4.4 5.1   * 102   CO2 23 24   GLU 98 128*   BUN 10 11   CREA 0.80 0.93   CA 8.8 9.2   MG 1.8  --    PHOS 3.2  --    ALB 2.9*  --    TBILI 0.2  --    ALT 25  --            Assessment:   CKTx in 3.2022 ,creat at baseline as per her  Hx of ESRD via AVF  Immunos (she gave us her accurate list today): Myfortic 180 3 tabq12                                                                              Pred  5                                                                               Tac 4/4 mg   SOB,pos flu,suspicion of PNA    PLAN:   Increase tac  Monitor Tac level also while on zithromax   /500(LOWER THAN HER MYORTIC ,VIEW INFECTION,LOWER WBC)  At risk for opportunistic infection   DISCUSSED WITH HER  Denice Grewal MD

## 2022-12-09 ENCOUNTER — TELEPHONE (OUTPATIENT)
Dept: FAMILY MEDICINE CLINIC | Age: 37
End: 2022-12-09

## 2022-12-09 ENCOUNTER — PATIENT OUTREACH (OUTPATIENT)
Dept: CASE MANAGEMENT | Age: 37
End: 2022-12-09

## 2022-12-09 NOTE — ACP (ADVANCE CARE PLANNING)
Advance Care Planning:   Does patient have an Advance Directive: not on file; education provided. Patient agrees to discussing at future outreach. Confirms healthcare decision makers are her parents.  Patient has no adult children (daughter is 12)    Primary Decision Maker: Marisela Mayuriprice - Mother - [phone]    Primary Decision Maker: Мария Wilkinsoncal - Father - [phone]

## 2022-12-09 NOTE — PROGRESS NOTES
Care Transitions Initial Call    Call within 2 business days of discharge: Yes     Patient: Kev Taylor Patient : 1985 MRN: [id number]    Last Discharge [address]       Date Complaint Diagnosis Description Type Department Provider    22 Cough Hypoxia . .. ED to Hosp-Admission (Discharged) (ADMIT) Burna Habermann, MD; Rashmi Brand. .. Was this an external facility discharge? No Discharge Facility: n/a    Challenges to be reviewed by the provider   Additional needs identified to be addressed with provider:     Needs PCP hospital follow up appt. [facility], message sent to nurse. Office will reach out to patient to schedule with new provider (patient last seen at practice on 2020 by Phoebe Anne NP). Patient prefers VV d/t transportation issues. CTN contact patient and relayed above info. Patient will follow up with [facility] transplant team on        Method of communication with provider : phone    Discussed 024 1066 related testing which was not done at this time. Test results were not done. Patient informed of results, if available? N/A     Advance Care Planning:   Does patient have an Advance Directive: not on file; education provided. Patient agrees to discussing at future outreach. Confirms healthcare decision makers are her parents. Patient has no adult children (daughter is 12)    Primary Decision Maker: Lisa Mercado - Mother - [phone]    Primary Decision Maker: Polo Draper - Father - [phone]     Inpatient Readmission Risk score: Unplanned Readmit Risk Score: 10.8    Was this a readmission?  no   Patient stated reason for the admission: SOB/influenza+    Patients top risk factors for readmission: medical condition-Kidney transplant 3/27/2022 and transportation   Interventions to address risk factors: Scheduled appointment with PCP-office to contact patient, Scheduled appointment with Specialist-, and Obtained and reviewed discharge summary and/or continuity of care documents    Care Transition Nurse (CTN) contacted the patient by telephone to perform post hospital discharge assessment. Verified name and  with patient as identifiers. Provided introduction to self, and explanation of the CTN role. CTN reviewed discharge instructions, medical action plan and red flags with patient who verbalized understanding. Were discharge instructions available to patient? yes. Reviewed appropriate site of care based on symptoms and resources available to patient including: PCP and Specialist. Patient given an opportunity to ask questions and does not have any further questions or concerns at this time. The patient agrees to contact the PCP office for questions related to their healthcare. Medication reconciliation was performed with patient, who verbalizes understanding of administration of home medications. Referral to Pharm D needed: no     Home Health/Outpatient orders at discharge: none     Durable Medical Equipment ordered at discharge: None    Discussed follow-up appointments. If no appointment was previously scheduled, appointment scheduling offered: yes. Is follow up appointment scheduled within 7 days of discharge? Patient will see [facility] transplant team on  . PCP office to contact patient to schedule hospital follow up for VV or in-person. Was last seen by Trevor Kasper NP (no longer at practice) on 2020. [address] follow up appointment(s):   Future Appointments   Date Time Provider Rocio Hardy   3/29/2023  9:40 AM Thu Chilel  S [facility]     Non-BS follow up appointment(s):   [facility]- transplant team on 2022 @ 8:15 am    Plan for follow-up call in 5-7 days based on severity of symptoms and risk factors. Plan for next call: symptom management-monitor red flags and follow up appointment-attend as directed  CTN provided contact information for future needs.      Goals Addressed This Visit's Progress     Prevent complications post hospitalization. 12/09/22  Medication compliance- patient will complete tamiflu course. Patient had kidney transplant earlier this year- 3/27/2022 at [facility]. She will reach out to [facility] transplant team to advise of recent hospitalization and flu dx. Patient will advise them of any troubling symptoms and request guidance before taking any OTC medications. Activity- activity intolerance/energy conservation/frequent rest.  Patient will monitor/report red flags- increased SOB, wheezing, worsening cough, excess phlegm/mucus, increased fatigue, CP, fever greater than 101 that does not respond to fever reducers, N/V/D-s/s dehydration/reduced po intake.

## 2022-12-12 NOTE — TELEPHONE ENCOUNTER
Attempted to call patient. Left voicemail to call the office back at [phone] to schedule hospital follow up. Pt will need to be scheduled as a VV and then change appt length to 30 mins.

## 2022-12-13 ENCOUNTER — PATIENT OUTREACH (OUTPATIENT)
Dept: CASE MANAGEMENT | Age: 37
End: 2022-12-13

## 2022-12-13 NOTE — PROGRESS NOTES
Care Transitions Follow Up Call     Care Transition Nurse (CTN) contacted the patient by telephone to follow up after admission on 12/6-12/8. Addressed changes since last contact: none  Follow up appointment completed? no.   Was follow up appointment scheduled within 7 days of discharge? Office reached out to patient to schedule VV per her request. She tells CTN message was received and she will contact PCP to schedule appt. .        CTN reviewed discharge instructions, medical action plan and red flags with patient and discussed any barriers to care and/or understanding of plan of care after discharge. Discussed appropriate site of care based on symptoms and resources available to patient including: PCP. The patient agrees to contact the PCP office for questions related to their healthcare. [address] follow up appointment(s):   Future Appointments   Date Time Provider Rocio Hardy   3/29/2023  9:40 AM Marian Merino  S [facility]     Non-[facility] follow up appointment(s):   Per patient attended [facility] follow up with transplant team    CTN provided contact information for future needs. Plan for follow-up call in 7-10 days based on severity of symptoms and risk factors. Plan for next call: follow up appointment-PCP appt       Goals Addressed                   This Visit's Progress     Prevent complications post hospitalization. 12/09/22  Medication compliance- patient will complete tamiflu course. Patient had kidney transplant earlier this year- 3/27/2022 at [facility]. She will reach out to [facility] transplant team to advise of recent hospitalization and flu dx. Patient will advise them of any troubling symptoms and request guidance before taking any OTC medications.   Activity- activity intolerance/energy conservation/frequent rest.  Patient will monitor/report red flags- increased SOB, wheezing, worsening cough, excess phlegm/mucus, increased fatigue, CP, fever greater than 101 that does not respond to fever reducers, N/V/D-s/s dehydration/reduced po intake. 12/13/22  Patient reports feeling much better. Completed tamiflu course. She will contact PCP office to schedule follow up.

## 2022-12-14 DIAGNOSIS — R10.2 PELVIC PAIN: ICD-10-CM

## 2022-12-14 DIAGNOSIS — N18.6 ESRD ON HEMODIALYSIS (HCC): ICD-10-CM

## 2022-12-14 DIAGNOSIS — Z99.2 ESRD ON HEMODIALYSIS (HCC): ICD-10-CM

## 2022-12-14 RX ORDER — OXYCODONE AND ACETAMINOPHEN 5; 325 MG/1; MG/1
1 TABLET ORAL
Qty: 20 TABLET | Refills: 0 | Status: SHIPPED | OUTPATIENT
Start: 2022-12-14 | End: 2022-12-28

## 2022-12-19 RX ORDER — NORETHINDRONE 5 MG/1
5 TABLET ORAL DAILY
Qty: 90 TABLET | Refills: 5 | Status: SHIPPED | OUTPATIENT
Start: 2022-12-19

## 2022-12-20 ENCOUNTER — PATIENT OUTREACH (OUTPATIENT)
Dept: CASE MANAGEMENT | Age: 37
End: 2022-12-20

## 2022-12-26 ENCOUNTER — HOSPITAL ENCOUNTER (EMERGENCY)
Age: 37
Discharge: HOME | End: 2022-12-26
Attending: EMERGENCY MEDICINE
Payer: MEDICARE

## 2022-12-26 VITALS
RESPIRATION RATE: 18 BRPM | SYSTOLIC BLOOD PRESSURE: 127 MMHG | WEIGHT: 230 LBS | HEART RATE: 85 BPM | BODY MASS INDEX: 38.32 KG/M2 | HEIGHT: 65 IN | OXYGEN SATURATION: 100 % | DIASTOLIC BLOOD PRESSURE: 87 MMHG | TEMPERATURE: 97.4 F

## 2022-12-26 DIAGNOSIS — B02.9 HERPES ZOSTER WITHOUT COMPLICATION: Primary | ICD-10-CM

## 2022-12-26 PROCEDURE — 99283 EMERGENCY DEPT VISIT LOW MDM: CPT

## 2022-12-26 RX ORDER — VALACYCLOVIR HYDROCHLORIDE 1 G/1
1000 TABLET, FILM COATED ORAL 3 TIMES DAILY
Qty: 21 TABLET | Refills: 0 | Status: SHIPPED | OUTPATIENT
Start: 2022-12-26 | End: 2023-01-02

## 2022-12-26 NOTE — ED TRIAGE NOTES
Patient arrives with c/o rash to left side of her chest and left upper back that she noticed this morning. Rash has the appearance of blisters. Patient reports pain and burning to the area.

## 2022-12-26 NOTE — ED NOTES
Pt ambulatory out of ED with discharge instructions and prescriptions in hand given by Dr. Merlin Schilder; pt verbalized understanding of discharge paperwork and time allotted for questions. VSS. Pt alert and oriented. Pt accompanied by mother.

## 2022-12-26 NOTE — ED PROVIDER NOTES
History of hypertension, anemia, asthma, chronic kidney disease, chronic pain, end-stage renal disease status post kidney transplant, GERD, pneumonia, hyperlipidemia, lupus, pneumonia, sepsis, thromboembolic disease. She presents accompanied by her mother with complaints of a rash. It began today. It is located on her left chest and left upper back. It is moderately painful. She denies other complaints. Her symptoms are moderate without relieving factors. Past Medical History:   Diagnosis Date    Anemia     secondary to lupus    Asthma     no inhaler use in past 2 to 3 years    Carditis     Chronic kidney disease     ESRD    Chronic pain     DDD (degenerative disc disease), lumbar     ESRD (end stage renal disease) ([address]     Fibroid tumor     GERD (gastroesophageal reflux disease)     HCAP (healthcare-associated pneumonia) 2019    Hemodialysis patient ([address] 2017    [address]  Tuesday,  Thursday,  and Saturday. Hypercholesterolemia     Hyperkalemia 3/4/2019    Hypertension     Hypokalemia 10/27/2017    Intra-abdominal abscess ([address] 2018    Intractable nausea and vomiting 10/21/2015    Long term (current) use of anticoagulants     Lupus (systemic lupus erythematosus) ([address]     Malignant hypertension with chronic kidney disease stage V ([address]     Peritoneal dialysis status ([address] 10/2015    x 2 years Stopped 2017 due to infection and removed. Peritonitis ([address] 3/11/2018    Pneumonia 3/14/2020    Poor historian 2018    With medications    Sepsis ([address] 2018    Thromboembolus ([address] 2013    lungs    Transfusion history     Last Transfusion 2017  at [facility]       Past Surgical History:   Procedure Laterality Date    HX  SECTION  11/2006    x1    HX OTHER SURGICAL  2015    INSERTION PD CATH;  Removed 2017    HX RENAL TRANSPLANT  2022    HX VASCULAR ACCESS Right 2017    Double-Lumen henry catheter upper chest    HX VASCULAR ACCESS Right 2018    right upper arm         Family History:   Problem Relation Age of Onset    Diabetes Father     Hypertension Father     Cancer Other         aunt with breast cancerX2    Diabetes Mother        Social History     Socioeconomic History    Marital status: SINGLE     Spouse name: Not on file    Number of children: Not on file    Years of education: Not on file    Highest education level: Not on file   Occupational History    Not on file   Tobacco Use    Smoking status: Never    Smokeless tobacco: Never   Substance and Sexual Activity    Alcohol use: No    Drug use: Yes     Types: Prescription, OTC    Sexual activity: Not Currently   Other Topics Concern     Service No    Blood Transfusions No    Caffeine Concern No    Occupational Exposure No    Hobby Hazards No    Sleep Concern No    Stress Concern No    Weight Concern No    Special Diet No    Back Care Yes     Comment: low back pain    Exercise No    Bike Helmet No    Seat Belt Yes    Self-Exams No   Social History Narrative    Lives with parents and daughter. Social Determinants of Health     Financial Resource Strain: Not on file   Food Insecurity: Not on file   Transportation Needs: Not on file   Physical Activity: Not on file   Stress: Not on file   Social Connections: Not on file   Intimate Partner Violence: Not on file   Housing Stability: Not on file         ALLERGIES: Compazine [prochlorperazine edisylate]    Review of Systems   All other systems reviewed and are negative. Vitals:    12/26/22 0958   BP: 127/87   Pulse: 85   Resp: 18   Temp: 97.4 °F (36.3 °C)   SpO2: 100%   Weight: 104.3 kg (230 lb)   Height: 5' 5\" (1.651 m)            Physical Exam  Vitals and nursing note reviewed. Constitutional:       Appearance: She is well-developed. HENT:      Head: Normocephalic and atraumatic. Eyes:      Conjunctiva/sclera: Conjunctivae normal.   Neck:      Trachea: No tracheal deviation.    Cardiovascular:      Rate and Rhythm: Normal rate.   Pulmonary:      Effort: Pulmonary effort is normal.   Abdominal:      General: There is no distension. Skin:     General: Skin is dry. Comments: Rash consistent with shingles on her left chest and left upper back. Neurological:      Mental Status: She is alert. [facility]         Procedures    Assessment/plan: Status post kidney transplant (immunosuppressed). She presents with a rash consistent with shingles. Reassuring appearance/exam with stable vital signs. Valtrex and PCP/transplant follow-up. Return precautions.   Lakisha Velazquez MD  12:13 PM

## 2022-12-26 NOTE — ED NOTES
Pt arrives ambulatory to the ED with CC of rash. Rash is located on patient chest and back and left side under arm. Pt notes pain is painful. Pt denies chest pain, abdominal pain, SOB , and nausea,vomiting, and diarrhea. Pt also denies fever, chills and nightsweats and also also denies being around others who have recently been ill./sick.

## 2022-12-27 DIAGNOSIS — Z99.2 ESRD ON HEMODIALYSIS (HCC): ICD-10-CM

## 2022-12-27 DIAGNOSIS — R10.2 PELVIC PAIN: ICD-10-CM

## 2022-12-27 DIAGNOSIS — N18.6 ESRD ON HEMODIALYSIS (HCC): ICD-10-CM

## 2022-12-27 RX ORDER — OXYCODONE AND ACETAMINOPHEN 5; 325 MG/1; MG/1
1 TABLET ORAL
Qty: 20 TABLET | Refills: 0 | Status: SHIPPED | OUTPATIENT
Start: 2022-12-28 | End: 2023-01-11

## 2022-12-28 ENCOUNTER — PATIENT OUTREACH (OUTPATIENT)
Dept: CASE MANAGEMENT | Age: 37
End: 2022-12-28

## 2022-12-28 NOTE — PROGRESS NOTES
Care Transitions Follow Up Call    Attempted to reach patient for follow up today. Unable to reach patient, LVMM.     Patient: Luis Alberto Baez Patient : 1985 MRN: [id number]    Last Discharge  Street       Date Complaint Diagnosis Description Type Department Provider    22 Rash Herpes zoster without complication ED (DISCHARGE) SPTED Rosalva Sena MD            Noted following upcoming appointments from discharge chart review:   [facility] follow up appointment(s):   Future Appointments   Date Time Provider Rocio Hardy   3/29/2023  9:40 AM Lydia Tripathi  S [facility] AMB

## 2023-01-10 ENCOUNTER — TELEPHONE (OUTPATIENT)
Dept: GYNECOLOGY | Age: 38
End: 2023-01-10

## 2023-01-10 NOTE — TELEPHONE ENCOUNTER
I spoke with patient, per Dr. Ree Adams, he agreed to continue prescribing her percocet until she had her kidney transplant, she had her transplant back in 3/2022 and we were not advised.   Going forward, we are unable to prescribe, or refill her percocet, patient was called and advised

## 2023-01-15 RX ORDER — AMITRIPTYLINE HYDROCHLORIDE 150 MG/1
150 TABLET, FILM COATED ORAL
Qty: 30 TABLET | Refills: 5 | Status: SHIPPED | OUTPATIENT
Start: 2023-01-15

## 2023-03-06 ENCOUNTER — PATIENT MESSAGE (OUTPATIENT)
Dept: NEUROLOGY | Age: 38
End: 2023-03-06

## 2023-03-16 ENCOUNTER — TELEPHONE (OUTPATIENT)
Dept: RHEUMATOLOGY | Age: 38
End: 2023-03-16

## 2023-03-16 NOTE — TELEPHONE ENCOUNTER
PT called to set up appt with , I informed her that she's not coming up as an established PT and he's not currently taking new PT's. PT stated she understood.

## 2023-03-27 ENCOUNTER — HOSPITAL ENCOUNTER (EMERGENCY)
Age: 38
Discharge: HOME | End: 2023-03-27
Attending: STUDENT IN AN ORGANIZED HEALTH CARE EDUCATION/TRAINING PROGRAM
Payer: MEDICARE

## 2023-03-27 VITALS
SYSTOLIC BLOOD PRESSURE: 133 MMHG | RESPIRATION RATE: 18 BRPM | DIASTOLIC BLOOD PRESSURE: 99 MMHG | OXYGEN SATURATION: 99 % | TEMPERATURE: 96.7 F | HEIGHT: 65 IN | HEART RATE: 82 BPM | WEIGHT: 265 LBS | BODY MASS INDEX: 44.15 KG/M2

## 2023-03-27 DIAGNOSIS — S91.351A: Primary | ICD-10-CM
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
## 2023-03-27 PROCEDURE — 74011250637 HC RX REV CODE- 250/637: Performed by: STUDENT IN AN ORGANIZED HEALTH CARE EDUCATION/TRAINING PROGRAM

## 2023-03-27 PROCEDURE — 99283 EMERGENCY DEPT VISIT LOW MDM: CPT

## 2023-03-27 RX ORDER — OXYCODONE HYDROCHLORIDE 5 MG/1
5 TABLET ORAL
Qty: 12 TABLET | Refills: 0 | Status: SHIPPED | OUTPATIENT
Start: 2023-03-27 | End: 2023-03-30

## 2023-03-27 RX ORDER — OXYCODONE HYDROCHLORIDE 5 MG/1
5 TABLET ORAL
Status: COMPLETED | OUTPATIENT
Start: 2023-03-27 | End: 2023-03-27

## 2023-03-27 RX ORDER — AMOXICILLIN AND CLAVULANATE POTASSIUM 875; 125 MG/1; MG/1
1 TABLET, FILM COATED ORAL 2 TIMES DAILY
Qty: 10 TABLET | Refills: 0 | Status: SHIPPED | OUTPATIENT
Start: 2023-03-27 | End: 2023-04-01

## 2023-03-27 RX ORDER — AMOXICILLIN AND CLAVULANATE POTASSIUM 875; 125 MG/1; MG/1
1 TABLET, FILM COATED ORAL 2 TIMES DAILY
Qty: 6 TABLET | Refills: 0 | Status: SHIPPED | OUTPATIENT
Start: 2023-03-27 | End: 2023-03-27 | Stop reason: SDUPTHER

## 2023-03-27 RX ADMIN — OXYCODONE 5 MG: 5 TABLET ORAL at 10:02

## 2023-03-27 NOTE — ED PROVIDER NOTES
EMERGENCY DEPARTMENT HISTORY AND PHYSICAL EXAM      Date: 3/27/2023  Patient Name: Olivia Aggarwal    History of Presenting Illness     HPI: Olivia Aggarwal, 41 y.o. female with pmhx of ESRD status post kidney transplant on immunosuppressants, presents to the ED with cc of dog bite wound to the dorsal aspect of the right foot. Patient reports sustaining bite wound at approximately 6 PM last night by her own dog (dog is UTD on all shots per patient). Reports progressive pain and swelling of the area since then. No associated redness, streaking, fevers or chills. No anticoagulant use. Patient has soaked her foot and cleaned it with iodine solution. Last tetanus shot was in 2020. PCP: Jean Stuart NP    No current facility-administered medications on file prior to encounter. Current Outpatient Medications on File Prior to Encounter   Medication Sig Dispense Refill    amitriptyline (ELAVIL) 150 mg tablet Take 1 Tablet by mouth nightly. 30 Tablet 5    norethindrone acetate (AYGESTIN) 5 mg tablet TAKE 1 TABLET BY MOUTH DAILY 90 Tablet 5    fluticasone propionate (FLONASE) 50 mcg/actuation nasal spray 1 [place] by Both Nostrils route as needed. magnesium oxide (MAG-OX) 400 mg tablet Take 400 mg by mouth two (2) times a day. pantoprazole (PROTONIX) 40 mg tablet Take 40 mg by mouth Daily (before breakfast). amLODIPine (NORVASC) 5 mg tablet Take 10 mg by mouth daily. tacrolimus (PROGRAF) 1 mg capsule Take 4 mg by mouth every twelve (12) hours. mycophenolate sodium (MYFORTIC) 360 mg delayed release tablet Take 180 mg by mouth two (2) times a day. sennosides (Senna) 8.6 mg cap Take 2 Tablets by mouth. ondansetron (Zofran ODT) 4 mg disintegrating tablet Take 1 Tab by mouth every eight (8) hours as needed for Nausea. 15 Tab 0    carvedilol (COREG) 25 mg tablet Take 1 Tab by mouth two (2) times daily (with meals).  60 Tab 0    predniSONE (DELTASONE) 5 mg tablet Take 2 Tabs by mouth daily. (Patient taking differently: Take 5 mg by mouth daily.) 30 Tab 0       Past History     Past Medical History:  Past Medical History:   Diagnosis Date    Anemia     secondary to lupus    Asthma     no inhaler use in past 2 to 3 years    Carditis     Chronic kidney disease     ESRD    Chronic pain     DDD (degenerative disc disease), lumbar     ESRD (end stage renal disease) ([address]     Fibroid tumor     GERD (gastroesophageal reflux disease)     HCAP (healthcare-associated pneumonia) 2019    Hemodialysis patient ([address] 2017    [address]  Tuesday,  Thursday,  and Saturday. Hypercholesterolemia     Hyperkalemia 3/4/2019    Hypertension     Hypokalemia 10/27/2017    Intra-abdominal abscess ([address] 2018    Intractable nausea and vomiting 10/21/2015    Long term (current) use of anticoagulants     Lupus (systemic lupus erythematosus) ([address]     Malignant hypertension with chronic kidney disease stage V ([address]     Peritoneal dialysis status ([address] 10/2015    x 2 years Stopped 2017 due to infection and removed. Peritonitis ([address] 3/11/2018    Pneumonia 3/14/2020    Poor historian 2018    With medications    Sepsis ([address] 2018    Thromboembolus ([address] 2013    lungs    Transfusion history     Last Transfusion 2017  at [facility]       Past Surgical History:  Past Surgical History:   Procedure Laterality Date    HX  SECTION  11/2006    x1    HX OTHER SURGICAL  2015    INSERTION PD CATH;  Removed 2017    HX RENAL TRANSPLANT  2022    HX VASCULAR ACCESS Right 2017    Double-Lumen henry catheter upper chest    HX VASCULAR ACCESS Right 2018    right upper arm       Family History:  Family History   Problem Relation Age of Onset    Diabetes Father     Hypertension Father     Cancer Other         aunt with breast cancerX2    Diabetes Mother        Social History:  Social History     Tobacco Use    Smoking status: Never    Smokeless tobacco: Never   Substance Use Topics    Alcohol use: No    Drug use: Yes     Types: Prescription, OTC       Allergies: Allergies   Allergen Reactions    Compazine [Prochlorperazine Edisylate] Nausea and Vomiting and Palpitations     \"hot and lightheaded\"         Review of Systems   Review of Systems   All other systems reviewed and are negative. Physical Exam   Physical Exam  Vitals and nursing note reviewed. Constitutional:       General: She is not in acute distress. Appearance: She is not ill-appearing or toxic-appearing. HENT:      Head: Normocephalic and atraumatic. Nose: Nose normal.      Mouth/Throat:      Mouth: Mucous membranes are moist.   Eyes:      Extraocular Movements: Extraocular movements intact. Pupils: Pupils are equal, round, and reactive to light. Cardiovascular:      Rate and Rhythm: Normal rate and regular rhythm. Pulses: Normal pulses. Pulmonary:      Effort: Pulmonary effort is normal.      Breath sounds: No stridor. No wheezing or rhonchi. Abdominal:      General: Abdomen is flat. There is no distension. Tenderness: There is no abdominal tenderness. Musculoskeletal:         General: Normal range of motion. Cervical back: Normal range of motion and neck supple. Feet:    Skin:     General: Skin is warm and dry. Neurological:      General: No focal deficit present. Mental Status: She is alert and oriented to person, place, and time. Psychiatric:         Judgment: Judgment normal.       Diagnostic Study Results     Labs -   No results found for this or any previous visit (from the past 24 hour(s)). Radiologic Studies -   No orders to display     CT Results  (Last 48 hours)      None          CXR Results  (Last 48 hours)      None            Medical Decision Making   IEileen MD-- am the first provider for this patient, and I am the attending of record for this patient encounter.     I reviewed the vital signs, available nursing notes, past medical history, past surgical history, family history and social history. Vital Signs-Reviewed the patient's vital signs. Patient Vitals for the past 24 hrs:   Temp Pulse Resp BP SpO2   03/27/23 0905 (!) 96.7 °F (35.9 °C) 91 18 127/86 99 %     Records Reviewed: Prior medical records and Nursing notes    Provider Notes (Medical Decision Making):   Bite wound is not amenable to bedside repair considering avulsion/loss of skin tissue. Bite wound is not excessively deep. We will plan for irrigation, wet-to-dry dressing. Will administer pain medication. We will also treat prophylactically for any developing bite wound associated infection with 5-day course of Augmentin considering patient is immunocompromised. Will refer to wound care and podiatry for ongoing management. ED Course:   Initial assessment performed. The patient's presenting problems have been discussed, and they are in agreement with the care plan formulated and outlined with them. I have encouraged them to ask questions as they arise throughout their visit. Frantz Hodge MD      Disposition:  NH      DISCHARGE PLAN:  1. Current Discharge Medication List        START taking these medications    Details   oxyCODONE IR (ROXICODONE) 5 mg immediate release tablet Take 1 Tablet by mouth every six (6) hours as needed for Pain for up to 3 days. Max Daily Amount: 20 mg.  Qty: 12 Tablet, Refills: 0  Start date: 3/27/2023, End date: 3/30/2023    Associated Diagnoses: Open wound of right foot due to dog bite      amoxicillin-clavulanate (Augmentin) 875-125 mg per tablet Take 1 Tablet by mouth two (2) times a day for 5 days. Qty: 10 Tablet, Refills: 0  Start date: 3/27/2023, End date: 4/1/2023           2.    Follow-up Information       Follow up With Specialties Details Why [address]  Call in 1 day  Madhav [address] [place], [place] [facility]  As needed [address]    [place] [address]  [phone]            3. Return to ED if worse     Diagnosis     Clinical Impression:   1. Open wound of right foot due to dog bite        Attestations:    Vicki Estrada MD    Please note that this dictation was completed with [employer], the computer voice recognition software. Quite often unanticipated grammatical, syntax, homophones, and other interpretive errors are inadvertently transcribed by the computer software. Please disregard these errors. Please excuse any errors that have escaped final proofreading. Thank you.

## 2023-03-27 NOTE — ED NOTES
Attempted to call Fede ([phone]) to report dog bite. No answer. Voicemail left with callback requested.

## 2023-03-27 NOTE — ED NOTES
Wound irrigated and cleaned with normal saline, wet to dry dressing applied. Covered with rolled gauze and ace bandage. Patient educated on wound care management. Verbalize understanding. Wound care follow up instructions provided.

## 2023-03-31 ENCOUNTER — OFFICE VISIT (OUTPATIENT)
Dept: NEUROLOGY | Age: 38
End: 2023-03-31

## 2023-03-31 VITALS
RESPIRATION RATE: 18 BRPM | OXYGEN SATURATION: 97 % | SYSTOLIC BLOOD PRESSURE: 138 MMHG | DIASTOLIC BLOOD PRESSURE: 78 MMHG | HEART RATE: 100 BPM | TEMPERATURE: 97.3 F

## 2023-03-31 DIAGNOSIS — R56.9 SINGLE SEIZURE (HCC): ICD-10-CM

## 2023-03-31 DIAGNOSIS — Z94.0 HISTORY OF RENAL TRANSPLANT: ICD-10-CM

## 2023-03-31 DIAGNOSIS — M79.671 RIGHT FOOT PAIN: ICD-10-CM

## 2023-03-31 DIAGNOSIS — S91.351A DOG BITE OF RIGHT FOOT, INITIAL ENCOUNTER: ICD-10-CM
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
## 2023-03-31 DIAGNOSIS — G47.00 INSOMNIA, UNSPECIFIED TYPE: Primary | ICD-10-CM

## 2023-03-31 RX ORDER — OXYCODONE AND ACETAMINOPHEN 5; 325 MG/1; MG/1
1 TABLET ORAL
Qty: 20 TABLET | Refills: 0 | Status: SHIPPED | OUTPATIENT
Start: 2023-03-31 | End: 2023-04-10

## 2023-03-31 NOTE — PROGRESS NOTES
Nitza Pitt (1985) is a 41 y.o. female, established patient, here for evaluation of the following     Chief complaint(s):   Chief Complaint   Patient presents with    Insomnia     Patient returns and she states she is sleeping at night with her medication. Patient states she was bitten by a pitbull on Monday and went to the ER. SUBJECTIVE/ OBJECTIVE:    HPI: 41 y.o. female ESRD/ HD    1) Chronic insomnia  Taking Amitriptyline 150 mg QHS. Denies any cognitive, urinary difficulty, dry mouth, or daytime drowsiness from the medication. 2) Hx of renal transplant 3-22-22  Continues to report that things renal issues are stable since transplant    3) Single seizure (oct 2019): none since; not on any anti-epileptic medication    4) New complaint: Right foot pain. Pt reports she was bitten in right foot by her dog/ pit bull earlier this week, went to ER where ER noted she was reporting pain/swelling and there was loss of skin/tissue due to the bite. Pt was started on antibiotics and has appt to see wound care on 4-3-2023. She reports the pain is moderate to severe at times and not alleviated by tylenol and motrin. She asks for short-term pain medication. Reviewed . Pt had been seeing Gyn for endometriosis/ ovarian cyst and had been receiving short term Rxs (5-10 days at a time) of percocet until end of December, when she says that the ovarian cyst burst on its own. Her foot is bandaged but swollen and gait look painful. D/w her I am willing to provide her with one Rx of Percocet 5-325 for 10 days but none beyond that. She expresses agreement and is hopeful that wound care will reduce the foot pain.      ==========================    Brief Hx/ Prior Data:     Hx of Single Episode Seizure (Oct 2019)    EMR triggered me to look into seizure history. I reviewed EMR and discussed with patient.        Per a Neurology Consult note at [facility] Dr Yenifer Nguyen, on 818-19: 29-year-old woman followed by [address] [place] [address] neurology admitted here for possible seizure. She does not remember the event. Apparently she was confused afterwards. according to the medical record she had a convulsive event about 4 min long after dialysis today. Foaming of the mouth. No tongue biting or incontinence. she has no history of seizures she is aware of. She feels her baseline now completely. She is encountering significant stress due to the unexpected passing of her brother due to an overdose. EEG done on 10-19-19 showed mild encephalopathic pattern, non-specific, no epileptiform discharges were seen. Brain MRI done during that admission was normal. Patient denies having any seizure-like episode prior to or after the one above in Oct 2019         Allergies   Allergen Reactions    Compazine [Prochlorperazine Edisylate] Nausea and Vomiting and Palpitations     \"hot and lightheaded\"         Current Outpatient Medications:     oxyCODONE-acetaminophen (PERCOCET) 5-325 mg per tablet, Take 1 Tablet by mouth two (2) times daily as needed for Pain (severe right foot pain due to dog bite) for up to 10 days. Max Daily Amount: 2 Tablets. , Disp: 20 Tablet, Rfl: 0    amitriptyline (ELAVIL) 150 mg tablet, Take 1 Tablet by mouth nightly., Disp: 30 Tablet, Rfl: 5    norethindrone acetate (AYGESTIN) 5 mg tablet, TAKE 1 TABLET BY MOUTH DAILY, Disp: 90 Tablet, Rfl: 5    fluticasone propionate (FLONASE) 50 mcg/actuation nasal spray, 1 [place] by Both Nostrils route as needed. , Disp: , Rfl:     magnesium oxide (MAG-OX) 400 mg tablet, Take 400 mg by mouth two (2) times a day., Disp: , Rfl:     amLODIPine (NORVASC) 5 mg tablet, Take 10 mg by mouth daily. , Disp: , Rfl:     tacrolimus (PROGRAF) 1 mg capsule, Take 4 mg by mouth every twelve (12) hours. , Disp: , Rfl:     mycophenolate sodium (MYFORTIC) 360 mg delayed release tablet, Take 180 mg by mouth two (2) times a day., Disp: , Rfl:     sennosides (Senna) 8.6 mg cap, Take 2 Tablets by mouth., Disp: , Rfl:     ondansetron (Zofran ODT) 4 mg disintegrating tablet, Take 1 Tab by mouth every eight (8) hours as needed for Nausea., Disp: 15 Tab, Rfl: 0    carvedilol (COREG) 25 mg tablet, Take 1 Tab by mouth two (2) times daily (with meals). , Disp: 60 Tab, Rfl: 0    predniSONE (DELTASONE) 5 mg tablet, Take 2 Tabs by mouth daily. (Patient taking differently: Take 5 mg by mouth daily.), Disp: 30 Tab, Rfl: 0    amoxicillin-clavulanate (Augmentin) 875-125 mg per tablet, Take 1 Tablet by mouth two (2) times a day for 5 days. (Patient not taking: Reported on 3/31/2023), Disp: 10 Tablet, Rfl: 0    pantoprazole (PROTONIX) 40 mg tablet, Take 40 mg by mouth Daily (before breakfast). , Disp: , Rfl:      has a past medical history of Anemia, Asthma, Carditis, Chronic kidney disease, Chronic pain, DDD (degenerative disc disease), lumbar, ESRD (end stage renal disease) ([address] Fibroid tumor, GERD (gastroesophageal reflux disease), HCAP (healthcare-associated pneumonia) (2019), Hemodialysis patient ([address] (2017), Hypercholesterolemia, Hyperkalemia (3/4/2019), Hypertension, Hypokalemia (10/27/2017), Intra-abdominal abscess ([address] (2018), Intractable nausea and vomiting (10/21/2015), Long term (current) use of anticoagulants, Lupus (systemic lupus erythematosus) ([address] Malignant hypertension with chronic kidney disease stage V ([address] Peritoneal dialysis status ([address] (10/2015), Peritonitis ([address] (3/11/2018), Pneumonia (3/14/2020), Poor historian (2018), Sepsis ([address] (2018), Thromboembolus ([address] (), and Transfusion history. She has no past medical history of Adverse effect of anesthesia, Difficult intubation, Malignant hyperthermia due to anesthesia, or Pseudocholinesterase deficiency.      has a past surgical history that includes hx  section (2006); hx other surgical (2015); hx vascular access (Right, 2017); hx vascular access (Right, 2018); and hx renal transplant (03/22/2022). Physical Exam:    Vitals:    03/31/23 0858   BP: 138/78   Pulse: 100   Temp: 97.3 °F (36.3 °C)   Resp: 18   SpO2: 97%     Awake alert oriented to person place situation  Face appears symmetric, hearing speech normal  No resting tremors  Right foot is bandaged and appears swollen  Gait is antalgic due to right foot pain    ========================================    ASSESSMENT/ PLAN:       ICD-10-CM ICD-9-CM    1. Insomnia, unspecified type  G47.00 780.52       2. Single seizure ([facility]  R56.9 780.39       3. History of renal transplant  Z94.0 V42.0       4. Right foot pain  M79.671 729.5 oxyCODONE-acetaminophen (PERCOCET) 5-325 mg per tablet      5. Dog bite of right foot, initial encounter  S91.351A 892.0 oxyCODONE-acetaminophen (PERCOCET) 5-325 mg per tablet    W54. 0XXA E906.0           1) Insomnia   Continue amitriptyline 150 mg tablet 1 tablet at bedtime  Did not need new prescription today (has refill)    2) Hx of single seizure in Oct 2019  Cause unclear. None before or after. No indication for patient to be on antiepileptic medication    3) Hx of renal transplant  Followed by Nephrology, Transplant Specialist    4) Right foot pain due to recent dog bite  10 day Rx of Percocet 5-325 one tab 2 times a day as needed for severe pain (#20 tablets total, no refill). Cautioned pt not to take percocet before driving or engaging in any activity that requires her full alertness. Pt to see Wound Clinic in a few days/  4-3-2023    4) F/u in 6 months      An electronic signature was used to authenticate this note.   -- Vishnu Dennis MD

## 2023-03-31 NOTE — LETTER
3/31/2023    Patient: Joselito Garces   YOB: 1985   Date of Visit: 3/31/2023     Aurora Apple NP  [address] Franks [address] [place] [address]  Via Fax: [phone]    Dear Aurora Apple NP,      Thank you for referring Ms. Ruma Mathews to [facility] for evaluation. My notes for this consultation are attached. If you have questions, please do not hesitate to call me. I look forward to following your patient along with you.       Sincerely,    Daysi Rodriguez MD

## 2023-03-31 NOTE — PROGRESS NOTES
Chief Complaint   Patient presents with    Insomnia     Patient returns and she states she is sleeping at night with her medication. Patient states she was bitten by a pitbull on Monday and went to the ER.

## 2023-05-11 ENCOUNTER — HOSPITAL ENCOUNTER (EMERGENCY)
Facility: HOSPITAL | Age: 38
Discharge: ANOTHER HOSPITAL | End: 2023-05-12
Attending: EMERGENCY MEDICINE
Payer: MEDICARE

## 2023-05-11 ENCOUNTER — APPOINTMENT (OUTPATIENT)
Facility: HOSPITAL | Age: 38
End: 2023-05-11
Payer: MEDICARE

## 2023-05-11 ENCOUNTER — HOSPITAL ENCOUNTER (EMERGENCY)
Facility: HOSPITAL | Age: 38
Discharge: HOME | End: 2023-05-11
Attending: EMERGENCY MEDICINE
Payer: MEDICARE

## 2023-05-11 VITALS
OXYGEN SATURATION: 100 % | TEMPERATURE: 98.2 F | DIASTOLIC BLOOD PRESSURE: 91 MMHG | RESPIRATION RATE: 16 BRPM | BODY MASS INDEX: 43.03 KG/M2 | SYSTOLIC BLOOD PRESSURE: 108 MMHG | WEIGHT: 258.6 LBS | HEART RATE: 80 BPM

## 2023-05-11 DIAGNOSIS — Z94.0 TRANSPLANTED KIDNEY: ICD-10-CM

## 2023-05-11 DIAGNOSIS — D72.829 LEUKOCYTOSIS, UNSPECIFIED TYPE: ICD-10-CM

## 2023-05-11 DIAGNOSIS — N17.9 AKI (ACUTE KIDNEY INJURY) (HCC): Primary | ICD-10-CM

## 2023-05-11 DIAGNOSIS — N75.0 INFECTED CYST OF BARTHOLIN'S GLAND DUCT: Primary | ICD-10-CM

## 2023-05-11 DIAGNOSIS — J18.9 PNEUMONIA OF LEFT LOWER LOBE DUE TO INFECTIOUS ORGANISM: ICD-10-CM

## 2023-05-11 LAB
ALBUMIN SERPL-MCNC: 3.4 G/DL (ref 3.5–5)
ALBUMIN/GLOB SERPL: 0.8 (ref 1.1–2.2)
ALP SERPL-CCNC: 96 U/L (ref 45–117)
ALT SERPL-CCNC: 21 U/L (ref 12–78)
ANION GAP SERPL CALC-SCNC: 12 MMOL/L (ref 5–15)
AST SERPL-CCNC: 19 U/L (ref 15–37)
BASOPHILS # BLD: 0 K/UL (ref 0–0.1)
BASOPHILS NFR BLD: 0 % (ref 0–1)
BILIRUB SERPL-MCNC: 0.7 MG/DL (ref 0.2–1)
BUN SERPL-MCNC: 21 MG/DL (ref 6–20)
BUN/CREAT SERPL: 8 (ref 12–20)
CALCIUM SERPL-MCNC: 9.1 MG/DL (ref 8.5–10.1)
CHLORIDE SERPL-SCNC: 97 MMOL/L (ref 97–108)
CO2 SERPL-SCNC: 23 MMOL/L (ref 21–32)
CREAT SERPL-MCNC: 2.53 MG/DL (ref 0.55–1.02)
D DIMER PPP FEU-MCNC: 4.1 MG/L FEU (ref 0–0.65)
DIFFERENTIAL METHOD BLD: ABNORMAL
EOSINOPHIL # BLD: 0 K/UL (ref 0–0.4)
EOSINOPHIL NFR BLD: 0 % (ref 0–7)
ERYTHROCYTE [DISTWIDTH] IN BLOOD BY AUTOMATED COUNT: 13.1 % (ref 11.5–14.5)
GLOBULIN SER CALC-MCNC: 4.4 G/DL (ref 2–4)
GLUCOSE SERPL-MCNC: 112 MG/DL (ref 65–100)
HCT VFR BLD AUTO: 39.8 % (ref 35–47)
HGB BLD-MCNC: 12.7 G/DL (ref 11.5–16)
IMM GRANULOCYTES # BLD AUTO: 0.2 K/UL
IMM GRANULOCYTES NFR BLD AUTO: 1 %
LACTATE SERPL-SCNC: 2 MMOL/L (ref 0.4–2)
LIPASE SERPL-CCNC: 32 U/L (ref 73–393)
LYMPHOCYTES # BLD: 0.4 K/UL (ref 0.8–3.5)
LYMPHOCYTES NFR BLD: 2 % (ref 12–49)
MAGNESIUM SERPL-MCNC: 1.1 MG/DL (ref 1.6–2.4)
MCH RBC QN AUTO: 28.1 PG (ref 26–34)
MCHC RBC AUTO-ENTMCNC: 31.9 G/DL (ref 30–36.5)
MCV RBC AUTO: 88.1 FL (ref 80–99)
MONOCYTES # BLD: 0.6 K/UL (ref 0–1)
MONOCYTES NFR BLD: 3 % (ref 5–13)
NEUTS BAND NFR BLD MANUAL: 10 % (ref 0–6)
NEUTS SEG # BLD: 17.4 K/UL (ref 1.8–8)
NEUTS SEG NFR BLD: 84 % (ref 32–75)
NRBC # BLD: 0 K/UL (ref 0–0.01)
NRBC BLD-RTO: 0 PER 100 WBC
PLATELET # BLD AUTO: 133 K/UL (ref 150–400)
PMV BLD AUTO: 10.1 FL (ref 8.9–12.9)
POTASSIUM SERPL-SCNC: 4.9 MMOL/L (ref 3.5–5.1)
PROT SERPL-MCNC: 7.8 G/DL (ref 6.4–8.2)
RBC # BLD AUTO: 4.52 M/UL (ref 3.8–5.2)
RBC MORPH BLD: ABNORMAL
SODIUM SERPL-SCNC: 132 MMOL/L (ref 136–145)
WBC # BLD AUTO: 18.6 K/UL (ref 3.6–11)

## 2023-05-11 PROCEDURE — 56420 I&D BARTHOLINS GLAND ABSCESS: CPT

## 2023-05-11 PROCEDURE — 83605 ASSAY OF LACTIC ACID: CPT

## 2023-05-11 PROCEDURE — 36415 COLL VENOUS BLD VENIPUNCTURE: CPT

## 2023-05-11 PROCEDURE — 2580000003 HC RX 258: Performed by: EMERGENCY MEDICINE

## 2023-05-11 PROCEDURE — 99282 EMERGENCY DEPT VISIT SF MDM: CPT

## 2023-05-11 PROCEDURE — 83735 ASSAY OF MAGNESIUM: CPT

## 2023-05-11 PROCEDURE — 85379 FIBRIN DEGRADATION QUANT: CPT

## 2023-05-11 PROCEDURE — 2500000003 HC RX 250 WO HCPCS: Performed by: EMERGENCY MEDICINE

## 2023-05-11 PROCEDURE — 85025 COMPLETE CBC W/AUTO DIFF WBC: CPT

## 2023-05-11 PROCEDURE — 71045 X-RAY EXAM CHEST 1 VIEW: CPT

## 2023-05-11 PROCEDURE — 80053 COMPREHEN METABOLIC PANEL: CPT

## 2023-05-11 PROCEDURE — 87040 BLOOD CULTURE FOR BACTERIA: CPT

## 2023-05-11 PROCEDURE — 83690 ASSAY OF LIPASE: CPT

## 2023-05-11 PROCEDURE — 6360000002 HC RX W HCPCS: Performed by: EMERGENCY MEDICINE

## 2023-05-11 RX ORDER — MORPHINE SULFATE 4 MG/ML
4 INJECTION, SOLUTION INTRAMUSCULAR; INTRAVENOUS
Status: COMPLETED | OUTPATIENT
Start: 2023-05-11 | End: 2023-05-11

## 2023-05-11 RX ORDER — ONDANSETRON 2 MG/ML
4 INJECTION INTRAMUSCULAR; INTRAVENOUS ONCE
Status: COMPLETED | OUTPATIENT
Start: 2023-05-11 | End: 2023-05-11

## 2023-05-11 RX ORDER — 0.9 % SODIUM CHLORIDE 0.9 %
1000 INTRAVENOUS SOLUTION INTRAVENOUS ONCE
Status: COMPLETED | OUTPATIENT
Start: 2023-05-11 | End: 2023-05-12

## 2023-05-11 RX ORDER — 0.9 % SODIUM CHLORIDE 0.9 %
500 INTRAVENOUS SOLUTION INTRAVENOUS ONCE
Status: COMPLETED | OUTPATIENT
Start: 2023-05-11 | End: 2023-05-12

## 2023-05-11 RX ORDER — LIDOCAINE HYDROCHLORIDE AND EPINEPHRINE 10; 10 MG/ML; UG/ML
20 INJECTION, SOLUTION INFILTRATION; PERINEURAL
Status: COMPLETED | OUTPATIENT
Start: 2023-05-11 | End: 2023-05-11

## 2023-05-11 RX ADMIN — SODIUM CHLORIDE 500 ML: 9 INJECTION, SOLUTION INTRAVENOUS at 22:14

## 2023-05-11 RX ADMIN — MORPHINE SULFATE 4 MG: 4 INJECTION, SOLUTION INTRAMUSCULAR; INTRAVENOUS at 23:01

## 2023-05-11 RX ADMIN — LIDOCAINE HYDROCHLORIDE,EPINEPHRINE BITARTRATE 20 ML: 10; .01 INJECTION, SOLUTION INFILTRATION; PERINEURAL at 08:38

## 2023-05-11 RX ADMIN — ONDANSETRON 4 MG: 2 INJECTION INTRAMUSCULAR; INTRAVENOUS at 22:12

## 2023-05-11 ASSESSMENT — PAIN DESCRIPTION - LOCATION
LOCATION: VAGINA;VULVA
LOCATION: BACK

## 2023-05-11 ASSESSMENT — PAIN DESCRIPTION - ORIENTATION: ORIENTATION: MID

## 2023-05-11 ASSESSMENT — PAIN - FUNCTIONAL ASSESSMENT
PAIN_FUNCTIONAL_ASSESSMENT: 0-10
PAIN_FUNCTIONAL_ASSESSMENT: 0-10

## 2023-05-11 ASSESSMENT — PAIN SCALES - GENERAL
PAINLEVEL_OUTOF10: 7
PAINLEVEL_OUTOF10: 8
PAINLEVEL_OUTOF10: 9

## 2023-05-11 ASSESSMENT — PAIN DESCRIPTION - DESCRIPTORS: DESCRIPTORS: ACHING

## 2023-05-11 NOTE — ED PROVIDER NOTES
[facility]  EMERGENCY DEPARTMENT ENCOUNTER      Pt Name: Margi Shahid  MRN: [id number]  [place] 1985  Date of evaluation: 5/11/2023  Physician: Jose Jamil MD    CHIEF COMPLAINT       Chief Complaint   Patient presents with    Cyst         HISTORY OF PRESENT ILLNESS   (Location/Symptom, Timing/Onset, Context/Setting, Quality, Duration, Modifying Factors, Severity)   Margi Shahid, 41 y.o., female     28-year-old female history of lupus and renal transplant presents with a chief complaint of a bump on her vagina. Patient noticed the area of swelling and pain on Tuesday. She has had similar symptoms in the past and required Word catheter placement presumably for a Bartholin's gland cyst.        Nursing Notes were reviewed. REVIEW OF SYSTEMS    (Not required)   Review of Systems   Constitutional:  Negative for fever. PAST MEDICAL HISTORY     Past Medical History:   Diagnosis Date    Anemia     secondary to lupus    Asthma     no inhaler use in past 2 to 3 years    Carditis     Chronic kidney disease     ESRD    Chronic pain     DDD (degenerative disc disease), lumbar     ESRD (end stage renal disease) ([address]     Fibroid tumor     GERD (gastroesophageal reflux disease)     HCAP (healthcare-associated pneumonia) 7/16/2019    Hemodialysis patient ([address] 12/21/2017    [address]  Tuesday,  Thursday,  and Saturday. Hypercholesterolemia     Hyperkalemia 3/4/2019    Hypertension     Hypokalemia 10/27/2017    Intra-abdominal abscess ([address] 5/12/2018    Intractable nausea and vomiting 10/21/2015    Long term (current) use of anticoagulants     Lupus (systemic lupus erythematosus) ([address]     Malignant hypertension with chronic kidney disease stage V ([address]     Peritoneal dialysis status ([address] 10/2015    x 2 years Stopped 12/2017 due to infection and removed.     Peritonitis ([address] 3/11/2018    Pneumonia 3/14/2020    Poor historian 01/17/2018    With medications    Sepsis ([address]

## 2023-05-11 NOTE — DISCHARGE INSTRUCTIONS
Thank you for allowing us to provide you with medical care today. We realize that you have many choices for your emergency care needs. We thank you for choosing [facility]. Please choose us in the future for any continued health care needs. The exam and treatment you received in the Emergency Department were for an emergent problem and are not intended as complete care. It is important that you follow up with a doctor, nurse practitioner, or physician's assistant for ongoing care. If your symptoms worsen or you do not improve as expected and you are unable to reach your usual health care provider, you should return to the Emergency Department. We are available 24 hours a day. Please make an appointment with your health care provider(s) for follow up of your Emergency Department visit. Take this sheet with you when you go to your follow-up visit.

## 2023-05-12 VITALS
WEIGHT: 264.55 LBS | DIASTOLIC BLOOD PRESSURE: 59 MMHG | BODY MASS INDEX: 44.08 KG/M2 | TEMPERATURE: 99.2 F | HEIGHT: 65 IN | SYSTOLIC BLOOD PRESSURE: 99 MMHG | RESPIRATION RATE: 21 BRPM | OXYGEN SATURATION: 91 % | HEART RATE: 109 BPM

## 2023-05-12 LAB
EKG ATRIAL RATE: 122 BPM
EKG DIAGNOSIS: NORMAL
EKG P AXIS: 50 DEGREES
EKG P-R INTERVAL: 138 MS
EKG Q-T INTERVAL: 308 MS
EKG QRS DURATION: 74 MS
EKG QTC CALCULATION (BAZETT): 438 MS
EKG R AXIS: 70 DEGREES
EKG T AXIS: 28 DEGREES
EKG VENTRICULAR RATE: 122 BPM
LACTATE SERPL-SCNC: 1.3 MMOL/L (ref 0.4–2)

## 2023-05-12 PROCEDURE — 6360000002 HC RX W HCPCS: Performed by: EMERGENCY MEDICINE

## 2023-05-12 PROCEDURE — 83605 ASSAY OF LACTIC ACID: CPT

## 2023-05-12 PROCEDURE — 2580000003 HC RX 258: Performed by: EMERGENCY MEDICINE

## 2023-05-12 PROCEDURE — 36415 COLL VENOUS BLD VENIPUNCTURE: CPT

## 2023-05-12 RX ORDER — MORPHINE SULFATE 2 MG/ML
2 INJECTION, SOLUTION INTRAMUSCULAR; INTRAVENOUS
Status: COMPLETED | OUTPATIENT
Start: 2023-05-12 | End: 2023-05-12

## 2023-05-12 RX ADMIN — SODIUM CHLORIDE 1000 ML: 9 INJECTION, SOLUTION INTRAVENOUS at 00:18

## 2023-05-12 RX ADMIN — WATER 1000 MG: 1 INJECTION INTRAMUSCULAR; INTRAVENOUS; SUBCUTANEOUS at 00:52

## 2023-05-12 RX ADMIN — MORPHINE SULFATE 2 MG: 2 INJECTION, SOLUTION INTRAMUSCULAR; INTRAVENOUS at 03:51

## 2023-05-12 ASSESSMENT — ENCOUNTER SYMPTOMS
NAUSEA: 1
VOMITING: 1
EYES NEGATIVE: 1
SHORTNESS OF BREATH: 1
ABDOMINAL PAIN: 1
BACK PAIN: 1

## 2023-05-12 ASSESSMENT — PAIN SCALES - GENERAL: PAINLEVEL_OUTOF10: 4

## 2023-05-12 ASSESSMENT — PAIN DESCRIPTION - DESCRIPTORS: DESCRIPTORS: ACHING

## 2023-05-12 ASSESSMENT — PAIN DESCRIPTION - LOCATION: LOCATION: BACK

## 2023-05-12 NOTE — ED TRIAGE NOTES
Pt seen earlier today for an vaginal abscess.   Now complaining of generalized weakness, fatigue, body aches

## 2023-05-12 NOTE — ED PROVIDER NOTES
bolus was given, will order 1 L for presumed CAP, ceftriaxone and azithromycin were ordered. Will admit to hospitalist service for ongoing IV antibiotics, work-up of LINNETTE, and work-up of possible PE. Reassessment: Spoke to hospitalist who requests patient be transferred instead to [facility] given her history with the transplant service there. Spoke to [facility] who accepted the patient. Patient reports that she has ongoing lower back pain despite 4 mg of morphine, will order her another 2 given that her blood pressure has improved to the high 90s. We will continue to manage her while she is in this emergency department    [facility] for Admission  12:42 AM    ED Room Number: SER06/06  Patient Name and age:  Massimo Moreaur 41 y.o.  female  Working Diagnosis: Pneumonia versus UTI, LINNETTE with solitary kidney    COVID-19 Suspicion: No  Sepsis present:  No  Reassessment needed: Yes  Code Status:  Full Code  Readmission: No  Isolation Requirements: no  Recommended Level of Care: med/surg  Department: [place] [facility] - [phone]  Consulting Provider: 41-year-old female with PMHx of SLE, ESRD status post kidney transplant, no longer on dialysis now, intra-abdominal abscess, pneumonia, and PE presents to the emergency department with multiple complaints, including generalized weakness, difficulty breathing, lower abdominal pain, and bilateral back pain since this morning. Clinical picture consistent with infection, possibly related to community-acquired pneumonia, UTI also possible although patient has not been able to produce a urine sample yet. She also has an LINNETTE with a creatinine of 2.3, up from normal, with a solitary transplanted kidney. Additionally, has an elevated D-dimer but with her decreased GFR, not able to pursue CTA at this facility to look for PE. Would like to admit for ongoing IV antibiotics, work-up of her LINNETTE, and work-up of possible PE.       Amount and/or Complexity of Data Reviewed  Labs:

## 2023-05-12 NOTE — ED NOTES
Patient transported to [address] [place] [address] with [address] ambulance transport center.      Ramiro Yost RN  05/12/23 0933

## 2023-05-17 NOTE — TELEPHONE ENCOUNTER
Patient states she is returning the nurse call in regards to her chest xray.    Best call back # for patient [phone] normal

## 2023-05-31 ENCOUNTER — OFFICE VISIT (OUTPATIENT)
Age: 38
End: 2023-05-31
Payer: MEDICARE

## 2023-05-31 VITALS
WEIGHT: 268 LBS | BODY MASS INDEX: 44.65 KG/M2 | RESPIRATION RATE: 12 BRPM | HEIGHT: 65 IN | DIASTOLIC BLOOD PRESSURE: 76 MMHG | HEART RATE: 87 BPM | TEMPERATURE: 98.6 F | SYSTOLIC BLOOD PRESSURE: 118 MMHG | OXYGEN SATURATION: 97 %

## 2023-05-31 DIAGNOSIS — M32.9 LUPUS (HCC): ICD-10-CM

## 2023-05-31 DIAGNOSIS — Z00.00 ENCOUNTER FOR MEDICAL EXAMINATION TO ESTABLISH CARE: ICD-10-CM

## 2023-05-31 DIAGNOSIS — Z94.0 KIDNEY TRANSPLANTED: ICD-10-CM

## 2023-05-31 DIAGNOSIS — D84.9 IMMUNODEFICIENCY, UNSPECIFIED (HCC): ICD-10-CM

## 2023-05-31 DIAGNOSIS — Z86.711 HISTORY OF PULMONARY EMBOLISM: ICD-10-CM

## 2023-05-31 DIAGNOSIS — Z00.00 ENCOUNTER FOR MEDICAL EXAMINATION TO ESTABLISH CARE: Primary | ICD-10-CM

## 2023-05-31 DIAGNOSIS — E66.01 SEVERE OBESITY (HCC): ICD-10-CM

## 2023-05-31 PROBLEM — R06.02 SOB (SHORTNESS OF BREATH): Status: RESOLVED | Noted: 2022-12-06 | Resolved: 2023-05-31

## 2023-05-31 PROBLEM — D69.6 THROMBOCYTOPENIA, UNSPECIFIED (HCC): Status: ACTIVE | Noted: 2023-05-31

## 2023-05-31 PROBLEM — F11.20 OPIOID DEPENDENCE, UNCOMPLICATED (HCC): Status: RESOLVED | Noted: 2021-08-10 | Resolved: 2023-05-31

## 2023-05-31 PROBLEM — N18.6 ESRD ON HEMODIALYSIS (HCC): Status: RESOLVED | Noted: 2018-08-27 | Resolved: 2023-05-31

## 2023-05-31 PROBLEM — Z99.2 ESRD ON HEMODIALYSIS (HCC): Status: RESOLVED | Noted: 2018-08-27 | Resolved: 2023-05-31

## 2023-05-31 PROBLEM — F11.99 OPIOID USE, UNSPECIFIED WITH UNSPECIFIED OPIOID-INDUCED DISORDER (HCC): Status: RESOLVED | Noted: 2021-08-10 | Resolved: 2023-05-31

## 2023-05-31 PROBLEM — F11.29 OPIOID DEPENDENCE WITH UNSPECIFIED OPIOID-INDUCED DISORDER (HCC): Status: RESOLVED | Noted: 2021-08-10 | Resolved: 2023-05-31

## 2023-05-31 PROCEDURE — 99203 OFFICE O/P NEW LOW 30 MIN: CPT | Performed by: INTERNAL MEDICINE

## 2023-05-31 PROCEDURE — G8417 CALC BMI ABV UP PARAM F/U: HCPCS | Performed by: INTERNAL MEDICINE

## 2023-05-31 PROCEDURE — 3074F SYST BP LT 130 MM HG: CPT | Performed by: INTERNAL MEDICINE

## 2023-05-31 PROCEDURE — 1036F TOBACCO NON-USER: CPT | Performed by: INTERNAL MEDICINE

## 2023-05-31 PROCEDURE — 3078F DIAST BP <80 MM HG: CPT | Performed by: INTERNAL MEDICINE

## 2023-05-31 PROCEDURE — G8427 DOCREV CUR MEDS BY ELIG CLIN: HCPCS | Performed by: INTERNAL MEDICINE

## 2023-05-31 RX ORDER — LEVONORGESTREL 52 MG/1
1 INTRAUTERINE DEVICE INTRAUTERINE ONCE
COMMUNITY

## 2023-05-31 SDOH — ECONOMIC STABILITY: HOUSING INSECURITY
IN THE LAST 12 MONTHS, WAS THERE A TIME WHEN YOU DID NOT HAVE A STEADY PLACE TO SLEEP OR SLEPT IN A SHELTER (INCLUDING NOW)?: NO

## 2023-05-31 SDOH — ECONOMIC STABILITY: INCOME INSECURITY: HOW HARD IS IT FOR YOU TO PAY FOR THE VERY BASICS LIKE FOOD, HOUSING, MEDICAL CARE, AND HEATING?: NOT HARD AT ALL

## 2023-05-31 SDOH — ECONOMIC STABILITY: FOOD INSECURITY: WITHIN THE PAST 12 MONTHS, THE FOOD YOU BOUGHT JUST DIDN'T LAST AND YOU DIDN'T HAVE MONEY TO GET MORE.: NEVER TRUE

## 2023-05-31 SDOH — ECONOMIC STABILITY: FOOD INSECURITY: WITHIN THE PAST 12 MONTHS, YOU WORRIED THAT YOUR FOOD WOULD RUN OUT BEFORE YOU GOT MONEY TO BUY MORE.: NEVER TRUE

## 2023-05-31 ASSESSMENT — PATIENT HEALTH QUESTIONNAIRE - PHQ9
SUM OF ALL RESPONSES TO PHQ QUESTIONS 1-9: 0
1. LITTLE INTEREST OR PLEASURE IN DOING THINGS: 0
2. FEELING DOWN, DEPRESSED OR HOPELESS: 0
SUM OF ALL RESPONSES TO PHQ QUESTIONS 1-9: 0
SUM OF ALL RESPONSES TO PHQ9 QUESTIONS 1 & 2: 0
SUM OF ALL RESPONSES TO PHQ QUESTIONS 1-9: 0
SUM OF ALL RESPONSES TO PHQ9 QUESTIONS 1 & 2: 0
SUM OF ALL RESPONSES TO PHQ QUESTIONS 1-9: 0
1. LITTLE INTEREST OR PLEASURE IN DOING THINGS: 0
2. FEELING DOWN, DEPRESSED OR HOPELESS: 0
SUM OF ALL RESPONSES TO PHQ QUESTIONS 1-9: 0
SUM OF ALL RESPONSES TO PHQ QUESTIONS 1-9: 0

## 2023-05-31 ASSESSMENT — ANXIETY QUESTIONNAIRES
2. NOT BEING ABLE TO STOP OR CONTROL WORRYING: 0
IF YOU CHECKED OFF ANY PROBLEMS ON THIS QUESTIONNAIRE, HOW DIFFICULT HAVE THESE PROBLEMS MADE IT FOR YOU TO DO YOUR WORK, TAKE CARE OF THINGS AT HOME, OR GET ALONG WITH OTHER PEOPLE: NOT DIFFICULT AT ALL
GAD7 TOTAL SCORE: 0
1. FEELING NERVOUS, ANXIOUS, OR ON EDGE: 0
6. BECOMING EASILY ANNOYED OR IRRITABLE: 0
4. TROUBLE RELAXING: 0
5. BEING SO RESTLESS THAT IT IS HARD TO SIT STILL: 0
3. WORRYING TOO MUCH ABOUT DIFFERENT THINGS: 0
7. FEELING AFRAID AS IF SOMETHING AWFUL MIGHT HAPPEN: 0

## 2023-05-31 ASSESSMENT — ENCOUNTER SYMPTOMS
RESPIRATORY NEGATIVE: 1
GASTROINTESTINAL NEGATIVE: 1
BACK PAIN: 1

## 2023-05-31 NOTE — PROGRESS NOTES
Darren Cash is a 41 y.o. female who presents today for the following: patient was seen to establish care. Has seen a PCP in the last few months. But this was a new provider. PMH of PE, kidney transplant, was on dialysis, back pain, lupsus, asthma. Patient had a transplant done a year ago at [facility]. Beforehand she was on peritoneal dialysis. She had an infection and was converted to hemodialysis. This has since stopped. Patient now reports clearance for yearly follow up. Is on immunosuppression for this. She also has a history of Lupus. Was dx in 2014. Will need to be followed by up with rheumatology. Has ongoing back pain as well. Was seen by [facility] in the past for pain control, but this was stopped last year. Is going to have a gastric sleeve later this summer. But needs support from her providers. Has been cleared by transplant provider. Was admitted to the hospital a few months ago for PNA. This has since cleared. Will follow back up in WBC as this was elevated at the time. Chief Complaint   Patient presents with    Pre-op Exam     Bariatric vert sleeve. Date TBD            PMH/PSH/Allergies/Social History/medication list and most recent studies reviewed with patient. reports that she has never smoked. She has never used smokeless tobacco.    reports no history of alcohol use. Vitals:    05/31/23 1427   BP: 118/76   Pulse: 87   Resp: 12   Temp: 98.6 °F (37 °C)   SpO2: 97%      Past Medical History:   Diagnosis Date    Anemia     secondary to lupus    Asthma     no inhaler use in past 2 to 3 years    Carditis     Chronic kidney disease     ESRD    Chronic pain     DDD (degenerative disc disease), lumbar     ESRD (end stage renal disease) ([facility] [address]     Fibroid tumor     GERD (gastroesophageal reflux disease)     HCAP (healthcare-associated pneumonia) 7/16/2019    Hemodialysis patient ([facility] [address] 12/21/2017    [address]  Tuesday,  Thursday,  and Saturday.

## 2023-06-01 LAB
ALBUMIN SERPL-MCNC: 4.2 G/DL (ref 3.8–4.8)
ALBUMIN/GLOB SERPL: 1.4 {RATIO} (ref 1.2–2.2)
ALP SERPL-CCNC: 86 IU/L (ref 44–121)
ALT SERPL-CCNC: 29 IU/L (ref 0–32)
APPEARANCE UR: CLEAR
AST SERPL-CCNC: 21 IU/L (ref 0–40)
BASOPHILS # BLD AUTO: 0 X10E3/UL (ref 0–0.2)
BASOPHILS NFR BLD AUTO: 1 %
BILIRUB SERPL-MCNC: 0.4 MG/DL (ref 0–1.2)
BILIRUB UR QL STRIP: NEGATIVE
BUN SERPL-MCNC: 12 MG/DL (ref 6–20)
BUN/CREAT SERPL: 12 (ref 9–23)
CALCIUM SERPL-MCNC: 9.8 MG/DL (ref 8.7–10.2)
CHLORIDE SERPL-SCNC: 102 MMOL/L (ref 96–106)
CO2 SERPL-SCNC: 18 MMOL/L (ref 20–29)
COLOR UR: YELLOW
CREAT SERPL-MCNC: 1.02 MG/DL (ref 0.57–1)
EGFRCR SERPLBLD CKD-EPI 2021: 73 ML/MIN/1.73
EOSINOPHIL # BLD AUTO: 0 X10E3/UL (ref 0–0.4)
EOSINOPHIL NFR BLD AUTO: 1 %
ERYTHROCYTE [DISTWIDTH] IN BLOOD BY AUTOMATED COUNT: 12.5 % (ref 11.7–15.4)
GLOBULIN SER CALC-MCNC: 3 G/DL (ref 1.5–4.5)
GLUCOSE SERPL-MCNC: 101 MG/DL (ref 70–99)
GLUCOSE UR QL STRIP: NEGATIVE
HCT VFR BLD AUTO: 38.3 % (ref 34–46.6)
HGB BLD-MCNC: 12.8 G/DL (ref 11.1–15.9)
HGB UR QL STRIP: NEGATIVE
IMM GRANULOCYTES # BLD AUTO: 0 X10E3/UL (ref 0–0.1)
IMM GRANULOCYTES NFR BLD AUTO: 1 %
KETONES UR QL STRIP: NEGATIVE
LEUKOCYTE ESTERASE UR QL STRIP: NEGATIVE
LYMPHOCYTES # BLD AUTO: 0.6 X10E3/UL (ref 0.7–3.1)
LYMPHOCYTES NFR BLD AUTO: 14 %
MCH RBC QN AUTO: 28.6 PG (ref 26.6–33)
MCHC RBC AUTO-ENTMCNC: 33.4 G/DL (ref 31.5–35.7)
MCV RBC AUTO: 86 FL (ref 79–97)
MONOCYTES # BLD AUTO: 0.3 X10E3/UL (ref 0.1–0.9)
MONOCYTES NFR BLD AUTO: 7 %
NEUTROPHILS # BLD AUTO: 3.2 X10E3/UL (ref 1.4–7)
NEUTROPHILS NFR BLD AUTO: 76 %
NITRITE UR QL STRIP: NEGATIVE
PH UR STRIP: 7.5 [PH] (ref 5–7.5)
PLATELET # BLD AUTO: 199 X10E3/UL (ref 150–450)
POTASSIUM SERPL-SCNC: 5 MMOL/L (ref 3.5–5.2)
PROT SERPL-MCNC: 7.2 G/DL (ref 6–8.5)
PROT UR QL STRIP: NEGATIVE
RBC # BLD AUTO: 4.47 X10E6/UL (ref 3.77–5.28)
SODIUM SERPL-SCNC: 135 MMOL/L (ref 134–144)
SP GR UR STRIP: 1.01 (ref 1–1.03)
UROBILINOGEN UR STRIP-MCNC: 1 MG/DL (ref 0.2–1)
WBC # BLD AUTO: 4.2 X10E3/UL (ref 3.4–10.8)

## 2023-06-13 RX ORDER — AMITRIPTYLINE HYDROCHLORIDE 150 MG/1
TABLET ORAL
Qty: 30 TABLET | OUTPATIENT
Start: 2023-06-13

## 2023-06-28 ENCOUNTER — TELEPHONE (OUTPATIENT)
Age: 38
End: 2023-06-28

## 2023-07-11 ENCOUNTER — TELEPHONE (OUTPATIENT)
Age: 38
End: 2023-07-11

## 2023-07-11 NOTE — TELEPHONE ENCOUNTER
Pt calling to check on status of letter for support. [facility]  Please fax to [phone] or [phone] patient couldn't confirm the correct fax number so she will give the office a call back.

## 2023-08-23 NOTE — DISCHARGE INSTRUCTIONS
Discharge Instructions       PATIENT ID: Rosibel Rivera  MRN: [id number]   YOB: 1985    DATE OF ADMISSION: [unfilled]    DATE OF DISCHARGE: 12/8/2022    PRIMARY CARE PROVIDER: @PCP@     ATTENDING PHYSICIAN: [unfilled]  DISCHARGING PROVIDER: Placido Yanes MD    To contact this individual call [phone] and ask the  to page. If unavailable ask to be transferred the Adult Hospitalist Department. DISCHARGE DIAGNOSES FLU     CONSULTATIONS: [unfilled]    PROCEDURES/SURGERIES: * No surgery found *    PENDING TEST RESULTS:   At the time of discharge the following test results are still pending:     [facility]   [facility]     ADDITIONAL CARE RECOMMENDATIONS:  please follow with transplant nephrology at [facility] as scheduled on dec 13 th    DIET: Renal Diet    ACTIVITY: Activity as tolerated    WOUND CARE:     EQUIPMENT needed:       Radiology      DISCHARGE MEDICATIONS:   See Medication Reconciliation Form    It is important that you take the medication exactly as they are prescribed. Keep your medication in the bottles provided by the pharmacist and keep a list of the medication names, dosages, and times to be taken in your wallet. Do not take other medications without consulting your doctor. NOTIFY YOUR PHYSICIAN FOR ANY OF THE FOLLOWING:   Fever over 101 degrees for 24 hours. Chest pain, shortness of breath, fever, chills, nausea, vomiting, diarrhea, change in mentation, falling, weakness, bleeding. Severe pain or pain not relieved by medications. Or, any other signs or symptoms that you may have questions about.       DISPOSITION:   x Home With:   OT  PT  HH  RN       SNF/Inpatient Rehab/LTAC    Independent/assisted living    Hospice    Other:     CDMP Checked:   Yes x     PROBLEM LIST Updated:  Yes x       Signed:   Placido Yanes MD  12/8/2022  11:37 AM
1.2

## 2023-09-22 ENCOUNTER — HOSPITAL ENCOUNTER (EMERGENCY)
Facility: HOSPITAL | Age: 38
Discharge: HOME | End: 2023-09-22
Attending: EMERGENCY MEDICINE
Payer: MEDICARE

## 2023-09-22 ENCOUNTER — APPOINTMENT (OUTPATIENT)
Facility: HOSPITAL | Age: 38
End: 2023-09-22
Attending: EMERGENCY MEDICINE
Payer: MEDICARE

## 2023-09-22 VITALS
DIASTOLIC BLOOD PRESSURE: 81 MMHG | WEIGHT: 249.34 LBS | HEART RATE: 83 BPM | HEIGHT: 65 IN | SYSTOLIC BLOOD PRESSURE: 112 MMHG | TEMPERATURE: 97.9 F | RESPIRATION RATE: 22 BRPM | OXYGEN SATURATION: 92 % | BODY MASS INDEX: 41.54 KG/M2

## 2023-09-22 DIAGNOSIS — R07.9 CHEST PAIN, UNSPECIFIED TYPE: Primary | ICD-10-CM

## 2023-09-22 LAB
ALBUMIN SERPL-MCNC: 3.9 G/DL (ref 3.5–5)
ALBUMIN/GLOB SERPL: 0.8 (ref 1.1–2.2)
ALP SERPL-CCNC: 73 U/L (ref 45–117)
ALT SERPL-CCNC: 84 U/L (ref 12–78)
ANION GAP SERPL CALC-SCNC: 11 MMOL/L (ref 5–15)
AST SERPL-CCNC: 126 U/L (ref 15–37)
BASOPHILS # BLD: 0 K/UL (ref 0–0.1)
BASOPHILS NFR BLD: 0 % (ref 0–1)
BILIRUB SERPL-MCNC: 0.8 MG/DL (ref 0.2–1)
BUN SERPL-MCNC: 10 MG/DL (ref 6–20)
BUN/CREAT SERPL: 11 (ref 12–20)
CALCIUM SERPL-MCNC: 9.4 MG/DL (ref 8.5–10.1)
CHLORIDE SERPL-SCNC: 102 MMOL/L (ref 97–108)
CO2 SERPL-SCNC: 25 MMOL/L (ref 21–32)
CREAT SERPL-MCNC: 0.95 MG/DL (ref 0.55–1.02)
DIFFERENTIAL METHOD BLD: ABNORMAL
EOSINOPHIL # BLD: 0 K/UL (ref 0–0.4)
EOSINOPHIL NFR BLD: 1 % (ref 0–7)
ERYTHROCYTE [DISTWIDTH] IN BLOOD BY AUTOMATED COUNT: 13.4 % (ref 11.5–14.5)
GLOBULIN SER CALC-MCNC: 4.6 G/DL (ref 2–4)
GLUCOSE SERPL-MCNC: 100 MG/DL (ref 65–100)
HCT VFR BLD AUTO: 43.6 % (ref 35–47)
HGB BLD-MCNC: 14 G/DL (ref 11.5–16)
IMM GRANULOCYTES # BLD AUTO: 0 K/UL
IMM GRANULOCYTES NFR BLD AUTO: 0 %
LYMPHOCYTES # BLD: 0.5 K/UL (ref 0.8–3.5)
LYMPHOCYTES NFR BLD: 11 % (ref 12–49)
MCH RBC QN AUTO: 28.2 PG (ref 26–34)
MCHC RBC AUTO-ENTMCNC: 32.1 G/DL (ref 30–36.5)
MCV RBC AUTO: 87.9 FL (ref 80–99)
MONOCYTES # BLD: 0.3 K/UL (ref 0–1)
MONOCYTES NFR BLD: 7 % (ref 5–13)
NEUTS SEG # BLD: 3.8 K/UL (ref 1.8–8)
NEUTS SEG NFR BLD: 81 % (ref 32–75)
NRBC # BLD: 0 K/UL (ref 0–0.01)
NRBC BLD-RTO: 0 PER 100 WBC
PLATELET # BLD AUTO: 200 K/UL (ref 150–400)
PMV BLD AUTO: 10.6 FL (ref 8.9–12.9)
POTASSIUM SERPL-SCNC: 4.3 MMOL/L (ref 3.5–5.1)
PROT SERPL-MCNC: 8.5 G/DL (ref 6.4–8.2)
RBC # BLD AUTO: 4.96 M/UL (ref 3.8–5.2)
RBC MORPH BLD: ABNORMAL
SODIUM SERPL-SCNC: 138 MMOL/L (ref 136–145)
TROPONIN I SERPL HS-MCNC: 9 NG/L (ref 0–51)
WBC # BLD AUTO: 4.6 K/UL (ref 3.6–11)

## 2023-09-22 PROCEDURE — 36415 COLL VENOUS BLD VENIPUNCTURE: CPT

## 2023-09-22 PROCEDURE — 96374 THER/PROPH/DIAG INJ IV PUSH: CPT

## 2023-09-22 PROCEDURE — 85025 COMPLETE CBC W/AUTO DIFF WBC: CPT

## 2023-09-22 PROCEDURE — 71045 X-RAY EXAM CHEST 1 VIEW: CPT

## 2023-09-22 PROCEDURE — 80053 COMPREHEN METABOLIC PANEL: CPT

## 2023-09-22 PROCEDURE — 99284 EMERGENCY DEPT VISIT MOD MDM: CPT

## 2023-09-22 PROCEDURE — 6370000000 HC RX 637 (ALT 250 FOR IP): Performed by: EMERGENCY MEDICINE

## 2023-09-22 PROCEDURE — 84484 ASSAY OF TROPONIN QUANT: CPT

## 2023-09-22 PROCEDURE — 6360000002 HC RX W HCPCS: Performed by: EMERGENCY MEDICINE

## 2023-09-22 RX ORDER — CYCLOBENZAPRINE HCL 5 MG
5 TABLET ORAL 2 TIMES DAILY PRN
Qty: 20 TABLET | Refills: 0 | Status: SHIPPED | OUTPATIENT
Start: 2023-09-22 | End: 2023-10-02

## 2023-09-22 RX ORDER — ONDANSETRON 4 MG/1
4 TABLET, ORALLY DISINTEGRATING ORAL ONCE
Status: COMPLETED | OUTPATIENT
Start: 2023-09-22 | End: 2023-09-22

## 2023-09-22 RX ORDER — KETOROLAC TROMETHAMINE 30 MG/ML
15 INJECTION, SOLUTION INTRAMUSCULAR; INTRAVENOUS ONCE
Status: COMPLETED | OUTPATIENT
Start: 2023-09-22 | End: 2023-09-22

## 2023-09-22 RX ADMIN — KETOROLAC TROMETHAMINE 15 MG: 30 INJECTION, SOLUTION INTRAMUSCULAR at 21:47

## 2023-09-22 RX ADMIN — ONDANSETRON 4 MG: 4 TABLET, ORALLY DISINTEGRATING ORAL at 20:37

## 2023-09-22 ASSESSMENT — PAIN DESCRIPTION - LOCATION
LOCATION: CHEST

## 2023-09-22 ASSESSMENT — PAIN SCALES - GENERAL
PAINLEVEL_OUTOF10: 7
PAINLEVEL_OUTOF10: 7
PAINLEVEL_OUTOF10: 5

## 2023-09-22 ASSESSMENT — PAIN DESCRIPTION - ORIENTATION: ORIENTATION: RIGHT

## 2023-09-22 ASSESSMENT — PAIN - FUNCTIONAL ASSESSMENT: PAIN_FUNCTIONAL_ASSESSMENT: 0-10

## 2023-09-23 NOTE — ED NOTES
Discharge instructions provided. Pt verbalized understanding. Opportunity provided for questions. Pt discharged home.       Davon Gudino RN  09/22/23 5157

## 2023-09-23 NOTE — ED PROVIDER NOTES
Hypertension Maternal Grandfather     Cancer Other         aunt with breast cancerX2          SOCIAL HISTORY       Social History     Socioeconomic History    Marital status: Single   Tobacco Use    Smoking status: Never    Smokeless tobacco: Never   Substance and Sexual Activity    Alcohol use: No    Drug use: Yes     Types: Prescription, OTC   Social History Narrative    Lives with parents and daughter. Social Determinants of Health     Financial Resource Strain: Low Risk  (5/31/2023)    Overall Financial Resource Strain (CARDIA)     Difficulty of Paying Living Expenses: Not hard at all   Food Insecurity: No Food Insecurity (5/31/2023)    Hunger Vital Sign     Worried About Running Out of Food in the Last Year: Never true     Ran Out of Food in the Last Year: Never true   Transportation Needs: Unknown (5/31/2023)    PRAPARE - Transportation     Lack of Transportation (Non-Medical): No   Housing Stability: Unknown (5/31/2023)    Housing Stability Vital Sign     Unstable Housing in the Last Year: No         PHYSICAL EXAM       ED Triage Vitals   BP Temp Temp src Pulse Resp SpO2 Height Weight   -- -- -- -- -- -- -- --       There is no height or weight on file to calculate BMI. Physical Exam  Vitals and nursing note reviewed. Constitutional:       Appearance: She is obese. Cardiovascular:      Rate and Rhythm: Normal rate. Heart sounds: No murmur heard. Pulmonary:      Effort: Pulmonary effort is normal. No respiratory distress. Breath sounds: Normal breath sounds. Neurological:      Mental Status: She is alert. EMERGENCY DEPARTMENT COURSE and DIFFERENTIAL DIAGNOSIS/MDM:   Vitals: There were no vitals filed for this visit. Medical Decision Making  76-year-old female presents to the emergency department as above with chest pain. Work-up is reassuring without evidence of ACS, PTX, pneumonia, PE, dissection. She does have lupus and has had a kidney transplant.   Will recommend

## 2023-09-23 NOTE — ED TRIAGE NOTES
Patient presents to ER via EMS with complaints of right sided chest pain through to back that started at about 1700 tonight while patient was sitting down. Patient describes pain as a dull pain.  Patient endorses shortness of breath and one episode of vomiting    History significant for kidney transplant in 2022

## 2023-09-25 LAB
EKG ATRIAL RATE: 81 BPM
EKG DIAGNOSIS: NORMAL
EKG P AXIS: 6 DEGREES
EKG P-R INTERVAL: 138 MS
EKG Q-T INTERVAL: 362 MS
EKG QRS DURATION: 78 MS
EKG QTC CALCULATION (BAZETT): 420 MS
EKG R AXIS: 38 DEGREES
EKG T AXIS: 10 DEGREES
EKG VENTRICULAR RATE: 81 BPM

## 2023-09-29 ENCOUNTER — TELEPHONE (OUTPATIENT)
Age: 38
End: 2023-09-29

## 2023-09-29 NOTE — TELEPHONE ENCOUNTER
Pt called and stated she was interested in the Bariatric. Aware I will email her the Seminar and Paperwork. Once its been turned in, I will be in touch to schedule her for the consultation. [email or web address]    Pt agreed and understood.

## 2023-10-03 NOTE — ED NOTES
0000 - Pt given meal, resting comfortably on stretcher. Pt updated on plan of care. 0025 - Attempted to call report. 0115 - TRANSFER - OUT REPORT:    Verbal report given to DANITZA Funez(name) on [place]  being transferred to [facility](unit) for routine progression of care       Report consisted of patients Situation, Background, Assessment and   Recommendations(SBAR). Information from the following report(s) SBAR, ED Summary, [facility] and Cardiac Rhythm SR was reviewed with the receiving nurse. Lines:   Peripheral IV 09/10/18 Left Antecubital (Active)   Site Assessment Clean, dry, & intact 9/10/2018 11:24 PM   Phlebitis Assessment 0 9/10/2018 11:24 PM   Infiltration Assessment 0 9/10/2018 11:24 PM   Dressing Status Clean, dry, & intact 9/10/2018 11:24 PM   Dressing Type Transparent 9/10/2018 11:24 PM        Opportunity for questions and clarification was provided. Patient transported with:   [facility], RN              0130 - Updated Valentin Cox RN about Dr. Lala Bridges order for repeat EKG due to increased troponin level. Incidental Actinic Keratosis Histology Text: Foci of partial thickness atypia were noted along the peripheral margin

## 2023-10-09 ENCOUNTER — OFFICE VISIT (OUTPATIENT)
Age: 38
End: 2023-10-09
Payer: MEDICARE

## 2023-10-09 ENCOUNTER — HOSPITAL ENCOUNTER (EMERGENCY)
Facility: HOSPITAL | Age: 38
Discharge: HOME | End: 2023-10-09
Attending: STUDENT IN AN ORGANIZED HEALTH CARE EDUCATION/TRAINING PROGRAM
Payer: MEDICARE

## 2023-10-09 VITALS
HEART RATE: 103 BPM | TEMPERATURE: 96 F | OXYGEN SATURATION: 97 % | SYSTOLIC BLOOD PRESSURE: 110 MMHG | DIASTOLIC BLOOD PRESSURE: 60 MMHG | HEIGHT: 65 IN | BODY MASS INDEX: 41.49 KG/M2

## 2023-10-09 VITALS
RESPIRATION RATE: 18 BRPM | OXYGEN SATURATION: 96 % | BODY MASS INDEX: 41.4 KG/M2 | TEMPERATURE: 99.6 F | HEIGHT: 65 IN | HEART RATE: 100 BPM | SYSTOLIC BLOOD PRESSURE: 113 MMHG | WEIGHT: 248.46 LBS | DIASTOLIC BLOOD PRESSURE: 75 MMHG

## 2023-10-09 DIAGNOSIS — B34.9 VIRAL SYNDROME: Primary | ICD-10-CM

## 2023-10-09 DIAGNOSIS — F51.04 PSYCHOPHYSIOLOGICAL INSOMNIA: ICD-10-CM

## 2023-10-09 DIAGNOSIS — G89.4 CHRONIC PAIN SYNDROME: Primary | ICD-10-CM

## 2023-10-09 PROCEDURE — 99215 OFFICE O/P EST HI 40 MIN: CPT | Performed by: PSYCHIATRY & NEUROLOGY

## 2023-10-09 PROCEDURE — 6370000000 HC RX 637 (ALT 250 FOR IP): Performed by: STUDENT IN AN ORGANIZED HEALTH CARE EDUCATION/TRAINING PROGRAM

## 2023-10-09 PROCEDURE — G8484 FLU IMMUNIZE NO ADMIN: HCPCS | Performed by: PSYCHIATRY & NEUROLOGY

## 2023-10-09 PROCEDURE — 3078F DIAST BP <80 MM HG: CPT | Performed by: PSYCHIATRY & NEUROLOGY

## 2023-10-09 PROCEDURE — 99283 EMERGENCY DEPT VISIT LOW MDM: CPT

## 2023-10-09 PROCEDURE — 3074F SYST BP LT 130 MM HG: CPT | Performed by: PSYCHIATRY & NEUROLOGY

## 2023-10-09 PROCEDURE — G8428 CUR MEDS NOT DOCUMENT: HCPCS | Performed by: PSYCHIATRY & NEUROLOGY

## 2023-10-09 PROCEDURE — G8417 CALC BMI ABV UP PARAM F/U: HCPCS | Performed by: PSYCHIATRY & NEUROLOGY

## 2023-10-09 PROCEDURE — 1036F TOBACCO NON-USER: CPT | Performed by: PSYCHIATRY & NEUROLOGY

## 2023-10-09 RX ORDER — AMITRIPTYLINE HYDROCHLORIDE 150 MG/1
150 TABLET ORAL NIGHTLY
Qty: 90 TABLET | Refills: 3 | Status: SHIPPED | OUTPATIENT
Start: 2023-10-09

## 2023-10-09 RX ORDER — TRAMADOL HYDROCHLORIDE 50 MG/1
50 TABLET ORAL
Status: COMPLETED | OUTPATIENT
Start: 2023-10-09 | End: 2023-10-09

## 2023-10-09 RX ADMIN — TRAMADOL HYDROCHLORIDE 50 MG: 50 TABLET, COATED ORAL at 20:23

## 2023-10-09 ASSESSMENT — PAIN SCALES - GENERAL
PAINLEVEL_OUTOF10: 9
PAINLEVEL_OUTOF10: 8

## 2023-10-09 ASSESSMENT — PAIN DESCRIPTION - LOCATION
LOCATION: GENERALIZED
LOCATION: HEAD

## 2023-10-09 ASSESSMENT — PAIN DESCRIPTION - DESCRIPTORS
DESCRIPTORS: ACHING
DESCRIPTORS: ACHING

## 2023-10-09 NOTE — ED TRIAGE NOTES
ED triage note: ambulatory with a steady gait. Reports headache started last night. States body aches and runny nose started today. Denies vomiting or diarrhea. Last dose of medication was tylenol at 1400. denies urinary symptoms.

## 2023-10-10 ASSESSMENT — ENCOUNTER SYMPTOMS: SHORTNESS OF BREATH: 0

## 2023-10-10 NOTE — ED NOTES
Patient left ED in no acute distress, alert and oriented x4. Patient was encourage to come back if symptoms get worse. Patient was provided with discharge instructions. All questions were answered. Patient left ambulatory.         Hi Mcneill RN  10/09/23 2026

## 2023-10-10 NOTE — ED PROVIDER NOTES
[facility] EMERGENCY CTR  EMERGENCY DEPARTMENT ENCOUNTER      Pt Name: Pennie Zepeda  MRN: [id number]  [address] [place] 1985  Date of evaluation: 10/9/2023  Provider: Elayne Sicard, MD    CHIEF COMPLAINT       Chief Complaint   Patient presents with    Generalized Body Aches         HISTORY OF PRESENT ILLNESS   27-year-old female with history significant for lupus, renal transplant, HTN presents to the ED with chief complaint of body aches starting today. Patient has had associated mild cough and congestion. Patient has had headache since yesterday. Patient has been taking Tylenol with minimal relief. No fevers, chills, chest pain, difficulty breathing, abdominal pain, urinary symptoms, bowel symptoms. No numbness, weakness, vision changes, speech difficulty. No known sick contacts. The history is provided by the patient. Review of External Medical Records:     Nursing Notes were reviewed. REVIEW OF SYSTEMS       Review of Systems   Respiratory:  Negative for shortness of breath. Cardiovascular:  Negative for chest pain. Except as noted above the remainder of the review of systems was reviewed and negative. PAST MEDICAL HISTORY     Past Medical History:   Diagnosis Date    Anemia     secondary to lupus    Asthma     no inhaler use in past 2 to 3 years    Carditis     Chronic kidney disease     ESRD    Chronic pain     DDD (degenerative disc disease), lumbar     ESRD (end stage renal disease) ([address])     Fibroid tumor     GERD (gastroesophageal reflux disease)     HCAP (healthcare-associated pneumonia) 7/16/2019    Hemodialysis patient ([address]) 12/21/2017    [facility]  Tuesday,  Thursday,  and Saturday.      Hypercholesterolemia     Hyperkalemia 3/4/2019    Hypertension     Hypokalemia 10/27/2017    Intra-abdominal abscess ([address]) 5/12/2018    Intractable nausea and vomiting 10/21/2015    Long term (current) use of anticoagulants     Lupus (systemic lupus erythematosus)

## 2023-12-10 ENCOUNTER — APPOINTMENT (OUTPATIENT)
Facility: HOSPITAL | Age: 38
End: 2023-12-10
Payer: MEDICARE

## 2023-12-10 ENCOUNTER — HOSPITAL ENCOUNTER (EMERGENCY)
Facility: HOSPITAL | Age: 38
Discharge: HOME | End: 2023-12-10
Attending: EMERGENCY MEDICINE
Payer: MEDICARE

## 2023-12-10 VITALS
HEIGHT: 65 IN | TEMPERATURE: 98.5 F | WEIGHT: 255.51 LBS | OXYGEN SATURATION: 95 % | DIASTOLIC BLOOD PRESSURE: 94 MMHG | HEART RATE: 86 BPM | BODY MASS INDEX: 42.57 KG/M2 | RESPIRATION RATE: 19 BRPM | SYSTOLIC BLOOD PRESSURE: 121 MMHG

## 2023-12-10 DIAGNOSIS — J06.9 ACUTE UPPER RESPIRATORY INFECTION: Primary | ICD-10-CM

## 2023-12-10 PROCEDURE — 99283 EMERGENCY DEPT VISIT LOW MDM: CPT

## 2023-12-10 PROCEDURE — 71046 X-RAY EXAM CHEST 2 VIEWS: CPT

## 2023-12-10 RX ORDER — ALBUTEROL SULFATE 90 UG/1
2 AEROSOL, METERED RESPIRATORY (INHALATION) EVERY 4 HOURS PRN
Qty: 1 EACH | Refills: 0 | Status: SHIPPED | OUTPATIENT
Start: 2023-12-10

## 2023-12-10 ASSESSMENT — PAIN SCALES - GENERAL: PAINLEVEL_OUTOF10: 8

## 2023-12-10 ASSESSMENT — ENCOUNTER SYMPTOMS
ABDOMINAL PAIN: 0
VOMITING: 0
SORE THROAT: 0
DIARRHEA: 0
SHORTNESS OF BREATH: 1
RHINORRHEA: 1
COUGH: 1
NAUSEA: 0
CONSTIPATION: 0

## 2023-12-10 ASSESSMENT — PAIN DESCRIPTION - LOCATION: LOCATION: GENERALIZED

## 2023-12-10 NOTE — ED PROVIDER NOTES
[facility]  EMERGENCY DEPARTMENT ENCOUNTER      Pt Name: Kleber Morrison  MRN: [id number]  [address] 1985  Date of evaluation: 12/10/2023  Provider: Ting Greenberg    CHIEF COMPLAINT       Chief Complaint   Patient presents with    Cough    Nasal Congestion    Generalized Body Aches         HISTORY OF PRESENT ILLNESS   (Location/Symptom, Timing/Onset, Context/Setting, Quality, Duration, Modifying Factors, Severity)  Note limiting factors. This is a 40 yo female with medical history remarkable for anemia, asthma, ESRD s/p transplant, hypertension and lupus presenting ambulatory to the ED with complaint of a two day history of runny nose, cough productive in nature, and SOB with cough. Daughter and mother have had similar symptoms in the past week. No medications prior to arrival. No associated fever, chills, headache, ear pain, chest pain, abdominal pain, nausea, vomiting or diarrhea. Appetite has been decreased. The history is provided by the patient. Review of External Medical Records:     Nursing Notes were reviewed. REVIEW OF SYSTEMS    (2-9 systems for level 4, 10 or more for level 5)     Review of Systems   Constitutional:  Positive for appetite change. Negative for activity change and fever. HENT:  Positive for congestion and rhinorrhea. Negative for ear pain and sore throat. Respiratory:  Positive for cough and shortness of breath. Cardiovascular:  Negative for chest pain. Gastrointestinal:  Negative for abdominal pain, constipation, diarrhea, nausea and vomiting. Genitourinary:  Negative for difficulty urinating. Musculoskeletal:  Positive for myalgias. Except as noted above the remainder of the review of systems was reviewed and negative.        PAST MEDICAL HISTORY     Past Medical History:   Diagnosis Date    Anemia     secondary to lupus    Asthma     no inhaler use in past 2 to 3 years    Carditis     Chronic kidney disease     ESRD

## 2023-12-11 NOTE — ED NOTES
Pt discharged in stable condition at this time. SUPRIYA and this RN reviewed discharge instructions, prescriptions, and follow up with patient at bedside. Pt verbalized understanding and denies any needs or questions at this time.         Spencer De Leon RN  12/10/23 7488

## 2024-04-08 NOTE — ED NOTES
DANG faxed to [facility] [phone]. Migraine     Patient/Caregiver Requests Family/Friend To Interpret

## 2024-07-09 ENCOUNTER — TELEPHONE (OUTPATIENT)
Age: 39
End: 2024-07-09

## 2024-07-09 NOTE — TELEPHONE ENCOUNTER
----- Message from Agatha Salinas LPN sent at 7/9/2024  4:07 PM EDT -----  Regarding: FW: [facility] Appointment Request  This message was sent to [facility]   ----- Message -----  From: Margaret Costa  Sent: 7/9/2024   3:50 PM EDT  To: #  Subject: E[facility] Appointment Request                          E[facility] Appointment Request    Patient needs appointment for E[facility] Appointment Type: Existing Condition Follow Up.    Patient Requested Dates(s):as soon as possible  Patient Requested Time:  Provider Name:Tamiko Soto    Reason for Appointment Request: Established Patient - Available appointments did not meet patient need. Pt wanted to see pcp for the paperwork's physical before the surgery for sleeves . And pt dont have a specific time and date for the surgery yet.  --------------------------------------------------------------------------------------------------------------------------    Relationship to Patient: Self     Call Back Information: OK to leave message on voicemail  Preferred Call Back Number: Phone [phone]

## 2024-07-11 ENCOUNTER — OFFICE VISIT (OUTPATIENT)
Age: 39
End: 2024-07-11
Payer: MEDICARE

## 2024-07-11 VITALS
HEART RATE: 89 BPM | TEMPERATURE: 98 F | SYSTOLIC BLOOD PRESSURE: 118 MMHG | HEIGHT: 65 IN | DIASTOLIC BLOOD PRESSURE: 78 MMHG | BODY MASS INDEX: 44.98 KG/M2 | RESPIRATION RATE: 19 BRPM | WEIGHT: 270 LBS | OXYGEN SATURATION: 98 %

## 2024-07-11 DIAGNOSIS — Z94.0 KIDNEY TRANSPLANTED: ICD-10-CM

## 2024-07-11 DIAGNOSIS — Z87.09 HISTORY OF ASTHMA: ICD-10-CM

## 2024-07-11 DIAGNOSIS — Z00.00 MEDICARE ANNUAL WELLNESS VISIT, SUBSEQUENT: ICD-10-CM

## 2024-07-11 DIAGNOSIS — Z86.711 HISTORY OF PULMONARY EMBOLISM: ICD-10-CM

## 2024-07-11 DIAGNOSIS — Z01.818 PRE-OP EXAM: Primary | ICD-10-CM

## 2024-07-11 DIAGNOSIS — E66.01 MORBID OBESITY (HCC): ICD-10-CM

## 2024-07-11 PROCEDURE — 99214 OFFICE O/P EST MOD 30 MIN: CPT | Performed by: INTERNAL MEDICINE

## 2024-07-11 PROCEDURE — G0439 PPPS, SUBSEQ VISIT: HCPCS | Performed by: INTERNAL MEDICINE

## 2024-07-11 PROCEDURE — 3074F SYST BP LT 130 MM HG: CPT | Performed by: INTERNAL MEDICINE

## 2024-07-11 PROCEDURE — G8417 CALC BMI ABV UP PARAM F/U: HCPCS | Performed by: INTERNAL MEDICINE

## 2024-07-11 PROCEDURE — G8427 DOCREV CUR MEDS BY ELIG CLIN: HCPCS | Performed by: INTERNAL MEDICINE

## 2024-07-11 PROCEDURE — 3078F DIAST BP <80 MM HG: CPT | Performed by: INTERNAL MEDICINE

## 2024-07-11 PROCEDURE — 1036F TOBACCO NON-USER: CPT | Performed by: INTERNAL MEDICINE

## 2024-07-11 RX ORDER — ERGOCALCIFEROL 1.25 MG/1
CAPSULE ORAL
COMMUNITY

## 2024-07-11 SDOH — ECONOMIC STABILITY: INCOME INSECURITY: HOW HARD IS IT FOR YOU TO PAY FOR THE VERY BASICS LIKE FOOD, HOUSING, MEDICAL CARE, AND HEATING?: NOT HARD AT ALL

## 2024-07-11 SDOH — ECONOMIC STABILITY: FOOD INSECURITY: WITHIN THE PAST 12 MONTHS, THE FOOD YOU BOUGHT JUST DIDN'T LAST AND YOU DIDN'T HAVE MONEY TO GET MORE.: NEVER TRUE

## 2024-07-11 SDOH — ECONOMIC STABILITY: FOOD INSECURITY: WITHIN THE PAST 12 MONTHS, YOU WORRIED THAT YOUR FOOD WOULD RUN OUT BEFORE YOU GOT MONEY TO BUY MORE.: NEVER TRUE

## 2024-07-11 ASSESSMENT — PATIENT HEALTH QUESTIONNAIRE - PHQ9
SUM OF ALL RESPONSES TO PHQ9 QUESTIONS 1 & 2: 0
2. FEELING DOWN, DEPRESSED OR HOPELESS: NOT AT ALL
SUM OF ALL RESPONSES TO PHQ QUESTIONS 1-9: 0
1. LITTLE INTEREST OR PLEASURE IN DOING THINGS: NOT AT ALL
SUM OF ALL RESPONSES TO PHQ QUESTIONS 1-9: 0

## 2024-07-11 ASSESSMENT — ENCOUNTER SYMPTOMS
RESPIRATORY NEGATIVE: 1
BACK PAIN: 1

## 2024-07-12 NOTE — PROGRESS NOTES
Chief Complaint   Patient presents with    Pre-op Exam     \"Have you been to the ER, urgent care clinic since your last visit?  Hospitalized since your last visit?\"    NO    “Have you seen or consulted any other health care providers outside of [facility] since your last visit?”    NO     “Have you had a pap smear?”    [facility] broad    No cervical cancer screening on file             Click Here for Release of Records Request    
needed      Mycophenolate Sodium 360 MG TBEC Take 0.5 tablets by mouth 2 times daily      Sennosides 8.6 MG CAPS Take 2 tablets by mouth      tacrolimus (PROGRAF) 1 MG capsule Take 4 capsules by mouth in the morning and 4 capsules in the evening.      pantoprazole (PROTONIX) 40 MG tablet Take 1 tablet by mouth every morning (before breakfast)       No current facility-administered medications on file prior to visit.           Review of Systems   Constitutional: Negative.    Respiratory: Negative.     Cardiovascular: Negative.    Musculoskeletal:  Positive for back pain.   Neurological: Negative.          Objective    Physical Exam   Physical Exam  Patient is in no acute distress. Obese   No cervical lymphadenopathy or thyromegaly in the neck.  Lungs are clear to auscultation. No wheezing.  S1 and S2 are heard. Regular rate and rhythm.  Radial pulses are +2 bilaterally.  strength is +5 bilaterally. No edema. Dorsalis pedis pulses are +2 bilaterally.  Skin is warm.    Vital Signs  Blood pressure is 118/78.      Assessment & Plan    Assessment & Plan  1. Preoperative evaluation.  A preoperative form will be completed and subsequently completed.    Bharath was seen today for pre-op exam.    Diagnoses and all orders for this visit:    Pre-op exam    Medicare annual wellness visit, subsequent    History of pulmonary embolism    Kidney transplanted    History of asthma    Morbid obesity (HCC)    Patient reports forms are to be sent to office to complete and faxed back   Patient from a PCP view is clear. Is to have blood working leading up to surgery.   Pulmonary visit is scheduled.   Patient reviewed about use of NSAIDS prior ro surgery.     Reviewed with patient medication side effects in detail   I answered all patient questions and concerns  Labs and testing done and/or upcoming labs/test were reviewed and discussed   Reviewed and discussed daily activity, exercise and diet      Return in about 6 months (around

## 2024-07-23 ENCOUNTER — HOSPITAL ENCOUNTER (OUTPATIENT)
Facility: HOSPITAL | Age: 39
Discharge: HOME | End: 2024-07-26
Attending: INTERNAL MEDICINE
Payer: MEDICARE

## 2024-07-23 ENCOUNTER — TRANSCRIBE ORDERS (OUTPATIENT)
Facility: HOSPITAL | Age: 39
End: 2024-07-23

## 2024-07-23 DIAGNOSIS — Z01.811 PREOP RESPIRATORY EXAM: Primary | ICD-10-CM

## 2024-07-23 DIAGNOSIS — Z01.811 PREOP RESPIRATORY EXAM: ICD-10-CM

## 2024-07-23 PROCEDURE — 71046 X-RAY EXAM CHEST 2 VIEWS: CPT

## 2024-07-25 ENCOUNTER — HOSPITAL ENCOUNTER (INPATIENT)
Facility: HOSPITAL | Age: 39
LOS: 8 days | Discharge: HOME | End: 2024-08-02
Attending: STUDENT IN AN ORGANIZED HEALTH CARE EDUCATION/TRAINING PROGRAM | Admitting: FAMILY MEDICINE
Payer: MEDICARE

## 2024-07-25 ENCOUNTER — APPOINTMENT (OUTPATIENT)
Facility: HOSPITAL | Age: 39
End: 2024-07-25
Payer: MEDICARE

## 2024-07-25 DIAGNOSIS — G89.4 CHRONIC PAIN SYNDROME: ICD-10-CM

## 2024-07-25 DIAGNOSIS — Z94.0 KIDNEY TRANSPLANT RECIPIENT: ICD-10-CM

## 2024-07-25 DIAGNOSIS — N30.01 ACUTE CYSTITIS WITH HEMATURIA: ICD-10-CM

## 2024-07-25 DIAGNOSIS — N17.9 AKI (ACUTE KIDNEY INJURY) (HCC): Primary | ICD-10-CM

## 2024-07-25 PROBLEM — A41.9 SEPSIS (HCC): Status: ACTIVE | Noted: 2024-07-25

## 2024-07-25 LAB
ALBUMIN SERPL-MCNC: 3.3 G/DL (ref 3.5–5)
ALBUMIN/GLOB SERPL: 0.7 (ref 1.1–2.2)
ALP SERPL-CCNC: 75 U/L (ref 45–117)
ALT SERPL-CCNC: 23 U/L (ref 12–78)
ANION GAP SERPL CALC-SCNC: 10 MMOL/L (ref 5–15)
APPEARANCE UR: CLEAR
AST SERPL-CCNC: 22 U/L (ref 15–37)
BACTERIA URNS QL MICRO: ABNORMAL /HPF
BASOPHILS # BLD: 0 K/UL (ref 0–0.1)
BASOPHILS NFR BLD: 0 % (ref 0–1)
BILIRUB SERPL-MCNC: 0.6 MG/DL (ref 0.2–1)
BILIRUB UR QL: NEGATIVE
BUN SERPL-MCNC: 23 MG/DL (ref 6–20)
BUN/CREAT SERPL: 11 (ref 12–20)
CALCIUM SERPL-MCNC: 9 MG/DL (ref 8.5–10.1)
CHLORIDE SERPL-SCNC: 97 MMOL/L (ref 97–108)
CO2 SERPL-SCNC: 26 MMOL/L (ref 21–32)
COLOR UR: ABNORMAL
CREAT SERPL-MCNC: 2.17 MG/DL (ref 0.55–1.02)
DIFFERENTIAL METHOD BLD: ABNORMAL
EOSINOPHIL # BLD: 0 K/UL (ref 0–0.4)
EOSINOPHIL NFR BLD: 0 % (ref 0–7)
EPITH CASTS URNS QL MICRO: ABNORMAL /LPF
ERYTHROCYTE [DISTWIDTH] IN BLOOD BY AUTOMATED COUNT: 13.6 % (ref 11.5–14.5)
FLUAV RNA SPEC QL NAA+PROBE: NOT DETECTED
FLUBV RNA SPEC QL NAA+PROBE: NOT DETECTED
GLOBULIN SER CALC-MCNC: 4.5 G/DL (ref 2–4)
GLUCOSE SERPL-MCNC: 166 MG/DL (ref 65–100)
GLUCOSE UR STRIP.AUTO-MCNC: NEGATIVE MG/DL
HCG UR QL: NEGATIVE
HCT VFR BLD AUTO: 38.5 % (ref 35–47)
HGB BLD-MCNC: 12.4 G/DL (ref 11.5–16)
HGB UR QL STRIP: ABNORMAL
IMM GRANULOCYTES # BLD AUTO: 0 K/UL
IMM GRANULOCYTES NFR BLD AUTO: 0 %
KETONES UR QL STRIP.AUTO: NEGATIVE MG/DL
LEUKOCYTE ESTERASE UR QL STRIP.AUTO: ABNORMAL
LIPASE SERPL-CCNC: 22 U/L (ref 13–75)
LYMPHOCYTES # BLD: 0.5 K/UL (ref 0.8–3.5)
LYMPHOCYTES NFR BLD: 4 % (ref 12–49)
MCH RBC QN AUTO: 29 PG (ref 26–34)
MCHC RBC AUTO-ENTMCNC: 32.2 G/DL (ref 30–36.5)
MCV RBC AUTO: 90.2 FL (ref 80–99)
MONOCYTES # BLD: 0.5 K/UL (ref 0–1)
MONOCYTES NFR BLD: 4 % (ref 5–13)
NEUTS BAND NFR BLD MANUAL: 3 % (ref 0–6)
NEUTS SEG # BLD: 11.3 K/UL (ref 1.8–8)
NEUTS SEG NFR BLD: 89 % (ref 32–75)
NITRITE UR QL STRIP.AUTO: NEGATIVE
NRBC # BLD: 0 K/UL (ref 0–0.01)
NRBC BLD-RTO: 0 PER 100 WBC
PH UR STRIP: 7 (ref 5–8)
PLATELET # BLD AUTO: 145 K/UL (ref 150–400)
PMV BLD AUTO: 9.9 FL (ref 8.9–12.9)
POTASSIUM SERPL-SCNC: 3.9 MMOL/L (ref 3.5–5.1)
PROT SERPL-MCNC: 7.8 G/DL (ref 6.4–8.2)
PROT UR STRIP-MCNC: 100 MG/DL
RBC # BLD AUTO: 4.27 M/UL (ref 3.8–5.2)
RBC #/AREA URNS HPF: ABNORMAL /HPF (ref 0–5)
RBC MORPH BLD: ABNORMAL
SARS-COV-2 RNA RESP QL NAA+PROBE: NOT DETECTED
SODIUM SERPL-SCNC: 133 MMOL/L (ref 136–145)
SP GR UR REFRACTOMETRY: 1.01 (ref 1–1.03)
URINE CULTURE IF INDICATED: ABNORMAL
UROBILINOGEN UR QL STRIP.AUTO: 1 EU/DL (ref 0.2–1)
WBC # BLD AUTO: 12.3 K/UL (ref 3.6–11)
WBC URNS QL MICRO: ABNORMAL /HPF (ref 0–4)

## 2024-07-25 PROCEDURE — 6360000002 HC RX W HCPCS

## 2024-07-25 PROCEDURE — 99285 EMERGENCY DEPT VISIT HI MDM: CPT

## 2024-07-25 PROCEDURE — 85025 COMPLETE CBC W/AUTO DIFF WBC: CPT

## 2024-07-25 PROCEDURE — 80053 COMPREHEN METABOLIC PANEL: CPT

## 2024-07-25 PROCEDURE — 87086 URINE CULTURE/COLONY COUNT: CPT

## 2024-07-25 PROCEDURE — 87186 SC STD MICRODIL/AGAR DIL: CPT

## 2024-07-25 PROCEDURE — 74176 CT ABD & PELVIS W/O CONTRAST: CPT

## 2024-07-25 PROCEDURE — 2580000003 HC RX 258

## 2024-07-25 PROCEDURE — 6370000000 HC RX 637 (ALT 250 FOR IP)

## 2024-07-25 PROCEDURE — 81025 URINE PREGNANCY TEST: CPT

## 2024-07-25 PROCEDURE — 83690 ASSAY OF LIPASE: CPT

## 2024-07-25 PROCEDURE — 87636 SARSCOV2 & INF A&B AMP PRB: CPT

## 2024-07-25 PROCEDURE — 96374 THER/PROPH/DIAG INJ IV PUSH: CPT

## 2024-07-25 PROCEDURE — 87088 URINE BACTERIA CULTURE: CPT

## 2024-07-25 PROCEDURE — 36415 COLL VENOUS BLD VENIPUNCTURE: CPT

## 2024-07-25 PROCEDURE — 6370000000 HC RX 637 (ALT 250 FOR IP): Performed by: STUDENT IN AN ORGANIZED HEALTH CARE EDUCATION/TRAINING PROGRAM

## 2024-07-25 PROCEDURE — 96375 TX/PRO/DX INJ NEW DRUG ADDON: CPT

## 2024-07-25 PROCEDURE — 2060000000 HC ICU INTERMEDIATE R&B

## 2024-07-25 PROCEDURE — 81001 URINALYSIS AUTO W/SCOPE: CPT

## 2024-07-25 RX ORDER — ACETAMINOPHEN 500 MG
1000 TABLET ORAL ONCE
Status: COMPLETED | OUTPATIENT
Start: 2024-07-25 | End: 2024-07-25

## 2024-07-25 RX ORDER — 0.9 % SODIUM CHLORIDE 0.9 %
1000 INTRAVENOUS SOLUTION INTRAVENOUS ONCE
Status: COMPLETED | OUTPATIENT
Start: 2024-07-25 | End: 2024-07-25

## 2024-07-25 RX ORDER — ACETAMINOPHEN 325 MG/1
650 TABLET ORAL
Status: COMPLETED | OUTPATIENT
Start: 2024-07-25 | End: 2024-07-25

## 2024-07-25 RX ORDER — MORPHINE SULFATE 4 MG/ML
4 INJECTION, SOLUTION INTRAMUSCULAR; INTRAVENOUS
Status: COMPLETED | OUTPATIENT
Start: 2024-07-25 | End: 2024-07-25

## 2024-07-25 RX ADMIN — ACETAMINOPHEN 1000 MG: 500 TABLET ORAL at 23:33

## 2024-07-25 RX ADMIN — WATER 1000 MG: 1 INJECTION INTRAMUSCULAR; INTRAVENOUS; SUBCUTANEOUS at 20:37

## 2024-07-25 RX ADMIN — ACETAMINOPHEN 650 MG: 325 TABLET ORAL at 17:22

## 2024-07-25 RX ADMIN — MORPHINE SULFATE 4 MG: 4 INJECTION, SOLUTION INTRAMUSCULAR; INTRAVENOUS at 19:31

## 2024-07-25 RX ADMIN — SODIUM CHLORIDE 1000 ML: 9 INJECTION, SOLUTION INTRAVENOUS at 17:22

## 2024-07-25 ASSESSMENT — PAIN SCALES - GENERAL
PAINLEVEL_OUTOF10: 8
PAINLEVEL_OUTOF10: 6

## 2024-07-25 ASSESSMENT — PAIN DESCRIPTION - LOCATION: LOCATION: GENERALIZED

## 2024-07-25 NOTE — ED PROVIDER NOTES
[facility]  EMERGENCY DEPARTMENT ENCOUNTER      Pt Name: Bharath Calderón  MRN: [id number]  Birthdate 1985  Date of evaluation: 2024  Provider: Ben Kennedy PA-C    CHIEF COMPLAINT       Chief Complaint   Patient presents with    Urinary Frequency    Fever    Back Pain         HISTORY OF PRESENT ILLNESS   (Location/Symptom, Timing/Onset, Context/Setting, Quality, Duration, Modifying Factors, Severity)  Note limiting factors.   39 year old female with history of kidney transplant, Lupus, and , reports to ED with increase in urinary frequency, suprapubic pain, R flank pain, and body aches x 3-4 days.  Endorses low grade fevers of around 100 with chills.  Denies N/V/D, chest pain, or sob.  On immunosuppresants.  COVID exposure with family last week.            Review of External Medical Records:     Nursing Notes were reviewed.    REVIEW OF SYSTEMS    (2-9 systems for level 4, 10 or more for level 5)     Review of Systems    Except as noted above the remainder of the review of systems was reviewed and negative.       PAST MEDICAL HISTORY     Past Medical History:   Diagnosis Date    Anemia     secondary to lupus    Asthma     no inhaler use in past 2 to 3 years    Carditis     Chronic back pain     Chronic kidney disease     ESRD    Chronic pain     DDD (degenerative disc disease), lumbar     ESRD (end stage renal disease) (HCC)     Fibroid tumor     GERD (gastroesophageal reflux disease)     HCAP (healthcare-associated pneumonia) 2019    Hemodialysis patient (HCC) 2017    [facility]  Tuesday,  Thursday,  and Saturday.     Hypercholesterolemia     Hyperkalemia 3/4/2019    Hypertension     Hypokalemia 10/27/2017    Intra-abdominal abscess (HCC) 2018    Intractable nausea and vomiting 10/21/2015    Long term (current) use of anticoagulants     Lupus (systemic lupus erythematosus) (HCC)     Malignant hypertension with chronic kidney disease stage V  (HCC)     Peritoneal dialysis status (HCC) 10/2015    x 2 years Stopped 2017 due to infection and removed.    Peritonitis (HCC) 3/11/2018    Pneumonia 3/14/2020    Poor historian 2018    With medications    Sepsis (HCC) 2018    Thromboembolus (HCC) 2013    lungs    Transfusion history     Last Transfusion 2017  at [facility]         SURGICAL HISTORY       Past Surgical History:   Procedure Laterality Date     SECTION  11/2006    x1    CT VISCERAL PERCUTANEOUS DRAIN  2018    CT VISCERAL PERCUTANEOUS DRAIN 2018 [facility] RAD CT    KIDNEY TRANSPLANT  2022    OTHER SURGICAL HISTORY  2015    INSERTION PD CATH; Removed 2017    US INSERT CATH PLEURA W IMAGE  2018    US INSERT CATH PLEURA W IMAGE 2018 [facility] RAD US    VASCULAR SURGERY Right 2018    right upper arm    VASCULAR SURGERY Right 2017    Double-Lumen kelly catheter upper chest         CURRENT MEDICATIONS       Previous Medications    ALBUTEROL SULFATE HFA (PROVENTIL;VENTOLIN;PROAIR) 108 (90 BASE) MCG/ACT INHALER    Inhale 2 puffs into the lungs every 4 hours as needed for Wheezing    AMITRIPTYLINE (ELAVIL) 150 MG TABLET    Take 1 tablet by mouth nightly    AMLODIPINE (NORVASC) 5 MG TABLET    Take 2 tablets by mouth daily    CARVEDILOL (COREG) 25 MG TABLET    Take 1 tablet by mouth 2 times daily (with meals)    FLUTICASONE (FLONASE) 50 MCG/ACT NASAL SPRAY    1 spray by Nasal route as needed    LEVONORGESTREL (MIRENA, 52 MG,) IUD 52 MG    1 each by IntraUTERine route once    MYCOPHENOLATE SODIUM 360 MG TBEC    Take 0.5 tablets by mouth 2 times daily    PANTOPRAZOLE (PROTONIX) 40 MG TABLET    Take 1 tablet by mouth every morning (before breakfast)    SENNOSIDES 8.6 MG CAPS    Take 2 tablets by mouth    TACROLIMUS (PROGRAF) 1 MG CAPSULE    Take 4 capsules by mouth in the morning and 4 capsules in the evening.    VITAMIN D (ERGOCALCIFEROL) 1.25 MG (71007 UT) CAPS CAPSULE    TAKE 1 CAPSULE BY MOUTH 1 TIME PER WEEK.

## 2024-07-25 NOTE — ED TRIAGE NOTES
Pt c/o urinary frequency, lower back pain, and body aches x3-4 days. Pt states that at first she thought it was a lupus flare. Pt has hx of kidney transplant, +immunosuppressants.

## 2024-07-26 LAB
ALBUMIN SERPL-MCNC: 3 G/DL (ref 3.5–5)
ANION GAP SERPL CALC-SCNC: 10 MMOL/L (ref 5–15)
ANION GAP SERPL CALC-SCNC: 11 MMOL/L (ref 5–15)
BASOPHILS # BLD: 0 K/UL (ref 0–0.1)
BASOPHILS NFR BLD: 0 % (ref 0–1)
BUN SERPL-MCNC: 27 MG/DL (ref 6–20)
BUN SERPL-MCNC: 27 MG/DL (ref 6–20)
BUN/CREAT SERPL: 9 (ref 12–20)
BUN/CREAT SERPL: 9 (ref 12–20)
CALCIUM SERPL-MCNC: 8.6 MG/DL (ref 8.5–10.1)
CALCIUM SERPL-MCNC: 8.8 MG/DL (ref 8.5–10.1)
CHLORIDE SERPL-SCNC: 101 MMOL/L (ref 97–108)
CHLORIDE SERPL-SCNC: 101 MMOL/L (ref 97–108)
CO2 SERPL-SCNC: 21 MMOL/L (ref 21–32)
CO2 SERPL-SCNC: 22 MMOL/L (ref 21–32)
CREAT SERPL-MCNC: 2.99 MG/DL (ref 0.55–1.02)
CREAT SERPL-MCNC: 3.01 MG/DL (ref 0.55–1.02)
DIFFERENTIAL METHOD BLD: ABNORMAL
EOSINOPHIL # BLD: 0 K/UL (ref 0–0.4)
EOSINOPHIL NFR BLD: 0 % (ref 0–7)
ERYTHROCYTE [DISTWIDTH] IN BLOOD BY AUTOMATED COUNT: 13.5 % (ref 11.5–14.5)
GLUCOSE SERPL-MCNC: 114 MG/DL (ref 65–100)
GLUCOSE SERPL-MCNC: 115 MG/DL (ref 65–100)
HCT VFR BLD AUTO: 35.2 % (ref 35–47)
HGB BLD-MCNC: 11 G/DL (ref 11.5–16)
IMM GRANULOCYTES # BLD AUTO: 0.2 K/UL (ref 0–0.04)
IMM GRANULOCYTES NFR BLD AUTO: 1 % (ref 0–0.5)
LACTATE SERPL-SCNC: 1.4 MMOL/L (ref 0.4–2)
LACTATE SERPL-SCNC: 2.1 MMOL/L (ref 0.4–2)
LYMPHOCYTES # BLD: 0.7 K/UL (ref 0.8–3.5)
LYMPHOCYTES NFR BLD: 4 % (ref 12–49)
MCH RBC QN AUTO: 28.4 PG (ref 26–34)
MCHC RBC AUTO-ENTMCNC: 31.3 G/DL (ref 30–36.5)
MCV RBC AUTO: 91 FL (ref 80–99)
MONOCYTES # BLD: 0.8 K/UL (ref 0–1)
MONOCYTES NFR BLD: 5 % (ref 5–13)
NEUTS SEG # BLD: 15.1 K/UL (ref 1.8–8)
NEUTS SEG NFR BLD: 90 % (ref 32–75)
NRBC # BLD: 0 K/UL (ref 0–0.01)
NRBC BLD-RTO: 0 PER 100 WBC
PHOSPHATE SERPL-MCNC: 3.3 MG/DL (ref 2.6–4.7)
PLATELET # BLD AUTO: 134 K/UL (ref 150–400)
PMV BLD AUTO: 10.1 FL (ref 8.9–12.9)
POTASSIUM SERPL-SCNC: 4.4 MMOL/L (ref 3.5–5.1)
POTASSIUM SERPL-SCNC: 4.4 MMOL/L (ref 3.5–5.1)
RBC # BLD AUTO: 3.87 M/UL (ref 3.8–5.2)
RBC MORPH BLD: ABNORMAL
SODIUM SERPL-SCNC: 133 MMOL/L (ref 136–145)
SODIUM SERPL-SCNC: 133 MMOL/L (ref 136–145)
WBC # BLD AUTO: 16.8 K/UL (ref 3.6–11)

## 2024-07-26 PROCEDURE — 80069 RENAL FUNCTION PANEL: CPT

## 2024-07-26 PROCEDURE — 6360000002 HC RX W HCPCS: Performed by: FAMILY MEDICINE

## 2024-07-26 PROCEDURE — 87040 BLOOD CULTURE FOR BACTERIA: CPT

## 2024-07-26 PROCEDURE — 2060000000 HC ICU INTERMEDIATE R&B

## 2024-07-26 PROCEDURE — 6370000000 HC RX 637 (ALT 250 FOR IP): Performed by: FAMILY MEDICINE

## 2024-07-26 PROCEDURE — 83605 ASSAY OF LACTIC ACID: CPT

## 2024-07-26 PROCEDURE — 80048 BASIC METABOLIC PNL TOTAL CA: CPT

## 2024-07-26 PROCEDURE — 6360000002 HC RX W HCPCS: Performed by: INTERNAL MEDICINE

## 2024-07-26 PROCEDURE — 85025 COMPLETE CBC W/AUTO DIFF WBC: CPT

## 2024-07-26 PROCEDURE — 36415 COLL VENOUS BLD VENIPUNCTURE: CPT

## 2024-07-26 PROCEDURE — 6370000000 HC RX 637 (ALT 250 FOR IP): Performed by: HOSPITALIST

## 2024-07-26 PROCEDURE — 2580000003 HC RX 258: Performed by: HOSPITALIST

## 2024-07-26 PROCEDURE — 2580000003 HC RX 258: Performed by: FAMILY MEDICINE

## 2024-07-26 RX ORDER — SODIUM CHLORIDE, SODIUM LACTATE, POTASSIUM CHLORIDE, CALCIUM CHLORIDE 600; 310; 30; 20 MG/100ML; MG/100ML; MG/100ML; MG/100ML
INJECTION, SOLUTION INTRAVENOUS CONTINUOUS
Status: DISCONTINUED | OUTPATIENT
Start: 2024-07-26 | End: 2024-07-27

## 2024-07-26 RX ORDER — SODIUM CHLORIDE 0.9 % (FLUSH) 0.9 %
5-40 SYRINGE (ML) INJECTION EVERY 12 HOURS SCHEDULED
Status: DISCONTINUED | OUTPATIENT
Start: 2024-07-26 | End: 2024-08-02 | Stop reason: HOSPADM

## 2024-07-26 RX ORDER — NALOXONE HYDROCHLORIDE 0.4 MG/ML
0.4 INJECTION, SOLUTION INTRAMUSCULAR; INTRAVENOUS; SUBCUTANEOUS PRN
Status: DISCONTINUED | OUTPATIENT
Start: 2024-07-26 | End: 2024-08-02 | Stop reason: HOSPADM

## 2024-07-26 RX ORDER — TACROLIMUS 1 MG/1
3 CAPSULE ORAL 2 TIMES DAILY
Status: DISCONTINUED | OUTPATIENT
Start: 2024-07-26 | End: 2024-07-26

## 2024-07-26 RX ORDER — AMITRIPTYLINE HYDROCHLORIDE 50 MG/1
150 TABLET, FILM COATED ORAL NIGHTLY
Status: DISCONTINUED | OUTPATIENT
Start: 2024-07-26 | End: 2024-07-26

## 2024-07-26 RX ORDER — PANTOPRAZOLE SODIUM 40 MG/1
40 TABLET, DELAYED RELEASE ORAL
Status: DISCONTINUED | OUTPATIENT
Start: 2024-07-26 | End: 2024-08-02 | Stop reason: HOSPADM

## 2024-07-26 RX ORDER — TACROLIMUS 1 MG/1
4 CAPSULE ORAL 2 TIMES DAILY
Status: DISCONTINUED | OUTPATIENT
Start: 2024-07-26 | End: 2024-08-02 | Stop reason: HOSPADM

## 2024-07-26 RX ORDER — SODIUM CHLORIDE 9 MG/ML
INJECTION, SOLUTION INTRAVENOUS PRN
Status: DISCONTINUED | OUTPATIENT
Start: 2024-07-26 | End: 2024-08-02 | Stop reason: HOSPADM

## 2024-07-26 RX ORDER — OXYCODONE HYDROCHLORIDE 5 MG/1
10 TABLET ORAL EVERY 4 HOURS PRN
Status: DISCONTINUED | OUTPATIENT
Start: 2024-07-26 | End: 2024-08-02 | Stop reason: HOSPADM

## 2024-07-26 RX ORDER — CARVEDILOL 12.5 MG/1
25 TABLET ORAL 2 TIMES DAILY WITH MEALS
Status: DISCONTINUED | OUTPATIENT
Start: 2024-07-26 | End: 2024-08-01

## 2024-07-26 RX ORDER — ACETAMINOPHEN 650 MG/1
650 SUPPOSITORY RECTAL EVERY 6 HOURS PRN
Status: DISCONTINUED | OUTPATIENT
Start: 2024-07-26 | End: 2024-08-02 | Stop reason: HOSPADM

## 2024-07-26 RX ORDER — SODIUM CHLORIDE, SODIUM LACTATE, POTASSIUM CHLORIDE, AND CALCIUM CHLORIDE .6; .31; .03; .02 G/100ML; G/100ML; G/100ML; G/100ML
500 INJECTION, SOLUTION INTRAVENOUS ONCE
Status: COMPLETED | OUTPATIENT
Start: 2024-07-26 | End: 2024-07-26

## 2024-07-26 RX ORDER — PREDNISONE 10 MG/1
5 TABLET ORAL DAILY
Status: DISCONTINUED | OUTPATIENT
Start: 2024-07-26 | End: 2024-08-02 | Stop reason: HOSPADM

## 2024-07-26 RX ORDER — MORPHINE SULFATE 4 MG/ML
4 INJECTION, SOLUTION INTRAMUSCULAR; INTRAVENOUS EVERY 4 HOURS PRN
Status: DISCONTINUED | OUTPATIENT
Start: 2024-07-26 | End: 2024-08-02 | Stop reason: HOSPADM

## 2024-07-26 RX ORDER — HEPARIN SODIUM 5000 [USP'U]/ML
5000 INJECTION, SOLUTION INTRAVENOUS; SUBCUTANEOUS EVERY 8 HOURS SCHEDULED
Status: DISCONTINUED | OUTPATIENT
Start: 2024-07-26 | End: 2024-08-02 | Stop reason: HOSPADM

## 2024-07-26 RX ORDER — SODIUM CHLORIDE 0.9 % (FLUSH) 0.9 %
5-40 SYRINGE (ML) INJECTION PRN
Status: DISCONTINUED | OUTPATIENT
Start: 2024-07-26 | End: 2024-08-02 | Stop reason: HOSPADM

## 2024-07-26 RX ORDER — ENOXAPARIN SODIUM 100 MG/ML
30 INJECTION SUBCUTANEOUS 2 TIMES DAILY
Status: DISCONTINUED | OUTPATIENT
Start: 2024-07-26 | End: 2024-07-26

## 2024-07-26 RX ORDER — PANTOPRAZOLE SODIUM 40 MG/1
40 TABLET, DELAYED RELEASE ORAL
Status: DISCONTINUED | OUTPATIENT
Start: 2024-07-26 | End: 2024-07-26

## 2024-07-26 RX ORDER — ACETAMINOPHEN 325 MG/1
650 TABLET ORAL EVERY 6 HOURS PRN
Status: DISCONTINUED | OUTPATIENT
Start: 2024-07-26 | End: 2024-08-02 | Stop reason: HOSPADM

## 2024-07-26 RX ADMIN — PANTOPRAZOLE SODIUM 40 MG: 40 TABLET, DELAYED RELEASE ORAL at 06:30

## 2024-07-26 RX ADMIN — MORPHINE SULFATE 4 MG: 4 INJECTION, SOLUTION INTRAMUSCULAR; INTRAVENOUS at 02:44

## 2024-07-26 RX ADMIN — WATER 1000 MG: 1 INJECTION INTRAMUSCULAR; INTRAVENOUS; SUBCUTANEOUS at 20:20

## 2024-07-26 RX ADMIN — SODIUM CHLORIDE, POTASSIUM CHLORIDE, SODIUM LACTATE AND CALCIUM CHLORIDE: 600; 310; 30; 20 INJECTION, SOLUTION INTRAVENOUS at 16:08

## 2024-07-26 RX ADMIN — TACROLIMUS 4 MG: 1 CAPSULE ORAL at 20:20

## 2024-07-26 RX ADMIN — SODIUM CHLORIDE, PRESERVATIVE FREE 10 ML: 5 INJECTION INTRAVENOUS at 08:49

## 2024-07-26 RX ADMIN — ENOXAPARIN SODIUM 30 MG: 100 INJECTION SUBCUTANEOUS at 02:44

## 2024-07-26 RX ADMIN — MORPHINE SULFATE 4 MG: 4 INJECTION, SOLUTION INTRAMUSCULAR; INTRAVENOUS at 20:20

## 2024-07-26 RX ADMIN — PREDNISONE 5 MG: 5 TABLET ORAL at 08:42

## 2024-07-26 RX ADMIN — SODIUM CHLORIDE, POTASSIUM CHLORIDE, SODIUM LACTATE AND CALCIUM CHLORIDE: 600; 310; 30; 20 INJECTION, SOLUTION INTRAVENOUS at 08:45

## 2024-07-26 RX ADMIN — SODIUM CHLORIDE, POTASSIUM CHLORIDE, SODIUM LACTATE AND CALCIUM CHLORIDE: 600; 310; 30; 20 INJECTION, SOLUTION INTRAVENOUS at 23:37

## 2024-07-26 RX ADMIN — SODIUM CHLORIDE, POTASSIUM CHLORIDE, SODIUM LACTATE AND CALCIUM CHLORIDE 500 ML: 600; 310; 30; 20 INJECTION, SOLUTION INTRAVENOUS at 08:55

## 2024-07-26 RX ADMIN — HEPARIN SODIUM 5000 UNITS: 5000 INJECTION INTRAVENOUS; SUBCUTANEOUS at 16:06

## 2024-07-26 RX ADMIN — TACROLIMUS 4 MG: 1 CAPSULE ORAL at 15:55

## 2024-07-26 RX ADMIN — SODIUM CHLORIDE, PRESERVATIVE FREE 10 ML: 5 INJECTION INTRAVENOUS at 20:24

## 2024-07-26 RX ADMIN — HEPARIN SODIUM 5000 UNITS: 5000 INJECTION INTRAVENOUS; SUBCUTANEOUS at 21:57

## 2024-07-26 RX ADMIN — OXYCODONE 10 MG: 5 TABLET ORAL at 10:51

## 2024-07-26 RX ADMIN — ACETAMINOPHEN 650 MG: 325 TABLET ORAL at 20:27

## 2024-07-26 ASSESSMENT — PAIN DESCRIPTION - LOCATION
LOCATION: GENERALIZED
LOCATION: ABDOMEN

## 2024-07-26 ASSESSMENT — PAIN DESCRIPTION - ONSET: ONSET: AWAKENED FROM SLEEP

## 2024-07-26 ASSESSMENT — PAIN SCALES - GENERAL
PAINLEVEL_OUTOF10: 10
PAINLEVEL_OUTOF10: 0
PAINLEVEL_OUTOF10: 8
PAINLEVEL_OUTOF10: 8

## 2024-07-26 ASSESSMENT — PAIN DESCRIPTION - FREQUENCY: FREQUENCY: INTERMITTENT

## 2024-07-26 ASSESSMENT — PAIN DESCRIPTION - DESCRIPTORS
DESCRIPTORS: ACHING
DESCRIPTORS: ACHING;DISCOMFORT

## 2024-07-26 ASSESSMENT — PAIN - FUNCTIONAL ASSESSMENT: PAIN_FUNCTIONAL_ASSESSMENT: PREVENTS OR INTERFERES SOME ACTIVE ACTIVITIES AND ADLS

## 2024-07-26 ASSESSMENT — PAIN DESCRIPTION - ORIENTATION: ORIENTATION: RIGHT

## 2024-07-26 ASSESSMENT — PAIN DESCRIPTION - PAIN TYPE: TYPE: ACUTE PAIN

## 2024-07-26 NOTE — ED NOTES
Patient provided with warm blankets and socks. No update on admission at this time. Awaiting orders from hospitalist.

## 2024-07-26 NOTE — ED NOTES
Verbal shift change report given to ROSA MARIA Chaves (oncoming nurse) by ROSA MARIA Bryan (offgoing nurse). Report included the following information Nurse Handoff Report, ED Encounter Summary, ED SBAR, MAR, and Recent Results.

## 2024-07-26 NOTE — CARE COORDINATION
Care Management Initial Assessment       RUR:  Readmission? No  1st IM letter given? Yes - 7/26/24  1st  letter given: No     07/26/24 1556   Service Assessment   Patient Orientation Alert and Oriented   Cognition Alert   History Provided By Patient   Primary Caregiver Self   Support Systems Parent   Patient's Healthcare Decision Maker is: Legal Next of Kin   PCP Verified by CM Yes   Last Visit to PCP Within last 6 months   Prior Functional Level Independent in ADLs/IADLs   Current Functional Level Independent in ADLs/IADLs   Can patient return to prior living arrangement Yes   Ability to make needs known: Good   Family able to assist with home care needs: Yes   Social/Functional History   Lives With Parent;Family   Type of Home House   Bathroom Toilet Standard   Bathroom Accessibility Accessible   Receives Help From Family   ADL Assistance Independent   Ambulation Assistance Independent   Transfer Assistance Independent   Active  Yes   Mode of Transportation Car   Occupation On disability   Discharge Planning   Type of Residence House   Living Arrangements Parent;Family Members   Potential Assistance Purchasing Medications No   History of falls? 0   Services At/After Discharge    Resource Information Provided? No   Mode of Transport at Discharge   (Family transport)   Confirm Follow Up Transport Self   Condition of Participation: Discharge Planning   The Patient and/or Patient Representative was provided with a Choice of Provider? Patient   The Patient and/Or Patient Representative agree with the Discharge Plan? Yes   Freedom of Choice list was provided with basic dialogue that supports the patient's individualized plan of care/goals, treatment preferences, and shares the quality data associated with the providers?  Yes     Admitted from home with complaints of fever, back pain and urinary frequency. History of Lupus and Renal transplant 2022. Nephrology following. At baseline she is independent  with ADLs. Pending progress and resolution of sepsis, likely discharge home with family. CM following for ERNST needs.

## 2024-07-26 NOTE — ADT AUTH CERT
Ben Kennedy PA-C  Physician Assistant  Emergency Medicine     ED Provider Notes     Attested     Date of Service: 2024  3:54 PM     Attested           Attestation signed by Opal Berumen DO at 2024 11:25 PM     I was personally available for consultation in the emergency department.  Patient high risk due to transplant, spoke with her transplant team who recommended admission. Ok to admit at our hospital with nephrology team, no indication for transfer for transplant. Patient agreeable and admitted to hospitalist.      The patient presented with       Chief Complaint   Patient presents with    Urinary Frequency    Fever    Back Pain             ICD-10-CM     1. LINNETTE (acute kidney injury) (HCC)  N17.9         2. Acute cystitis with hematuria  N30.01         3. Kidney transplant recipient  Z94.0               Opal Berumen DO                Expand All Collapse All            [facility]  EMERGENCY DEPARTMENT ENCOUNTER       Pt Name: Bharath Calderón  MRN: [id number]  Birthdate 1985  Date of evaluation: 2024  Provider: Ben Kennedy PA-C     CHIEF COMPLAINT            Chief Complaint   Patient presents with    Urinary Frequency    Fever    Back Pain            HISTORY OF PRESENT ILLNESS   (Location/Symptom, Timing/Onset, Context/Setting, Quality, Duration, Modifying Factors, Severity)  Note limiting factors.   39 year old female with history of kidney transplant, Lupus, and , reports to ED with increase in urinary frequency, suprapubic pain, R flank pain, and body aches x 3-4 days.  Endorses low grade fevers of around 100 with chills.  Denies N/V/D, chest pain, or sob.  On immunosuppresants.  COVID exposure with family last week.                 Review of External Medical Records:      Nursing Notes were reviewed.     REVIEW OF SYSTEMS    (2-9 systems for level 4, 10 or more for level 5)      Review of Systems     Except as noted above the remainder of the  [facility] renal transplant who recommended admission for nephrology evaluation.        Presentation, management, and disposition were discussed with the attending physician, Dr. Berumen, who is in agreement with plan of care.        Amount and/or Complexity of Data Reviewed  Labs: ordered.  Radiology: ordered.     Risk  OTC drugs.  Prescription drug management.                 REASSESSMENT            CONSULTS:  IP CONSULT TO HOSPITALIST     PROCEDURES:  Unless otherwise noted below, none     Procedures        FINAL IMPRESSION       1. LINNETTE (acute kidney injury) (HCC)    2. Acute cystitis with hematuria    3. Kidney transplant recipient           DISPOSITION/PLAN   DISPOSITION Decision To Admit 07/25/2024 08:33:26 PM        PATIENT REFERRED TO:  No follow-up provider specified.     DISCHARGE MEDICATIONS:      New Prescriptions     No medications on file            (Please note that portions of this note were completed with a voice recognition program.  Efforts were made to edit the dictations but occasionally words are mis-transcribed.)     Ben Kennedy PA-C (electronically signed)  Emergency Attending Physician / Physician Assistant / Nurse Practitioner                 Ben Kennedy PA-C  07/25/24 2042               Cosigned by: Opal Berumen DO at 7/25/2024 11:25 PM   Electronically signed by Ben Kennedy PA-C at 7/25/2024  8:42 PM  Electronically signed by Opal Berumen DO at 7/25/2024 11:25 PM         ED to Hosp-Admission (Current) on 7/25/2024            Revision History            Detailed Report          Note shared with patient  ED Provider Notes Info    Author Note Status Last Update User Last Update Date/Time   Ben Kennedy PA-C Attested Opal Berumen DO 7/25/2024 11:25 PM     Chart Review Routing History

## 2024-07-26 NOTE — PROGRESS NOTES
24hrs VS reviewed since prior progress note. Most recent are:  Vitals:    07/26/24 1400   BP:    Pulse: (!) 110   Resp:    Temp:    SpO2:          Intake/Output Summary (Last 24 hours) at 7/26/2024 1458  Last data filed at 7/25/2024 1836  Gross per 24 hour   Intake 1000 ml   Output --   Net 1000 ml        Physical Examination:     I had a face to face encounter with this patient and independently examined them on 7/26/2024 as outlined below:          General : alert x 3, awake, no acute distress,   HEENT: PEERL, EOMI, moist mucus membrane, TM clear  Neck: supple, no JVD, no meningeal signs  Chest: Clear to auscultation bilaterally   CVS: Tachycardic, S1 S2 heard, Capillary refill less than 2 seconds  Abd: soft/ non tender, non distended, BS physiological,    Right lower quadrant tenderness (where she has the he transplanted kidney)  Ext: no clubbing, no cyanosis, no edema, brisk 2+ DP pulses  Neuro/Psych: pleasant mood and affect, CN 2-12 grossly intact, sensory grossly within normal limit, Strength 5/5 in all extremities, DTR 1+ x 4  Skin: warm     Data Review:    Review and/or order of clinical lab test  Review and/or order of tests in the radiology section of CPT  Review and/or order of tests in the medicine section of CPT      I have personally and independently reviewed all pertinent labs, diagnostic studies, imaging, and have provided independent interpretation of the same.     Labs:     Recent Labs     07/25/24 1716 07/26/24 0718   WBC 12.3* 16.8*   HGB 12.4 11.0*   HCT 38.5 35.2   * 134*     Recent Labs     07/25/24 1716 07/26/24  0718   * 133*  133*   K 3.9 4.4  4.4   CL 97 101  101   CO2 26 21  22   BUN 23* 27*  27*   PHOS  --  3.3     Recent Labs     07/25/24 1716   ALT 23   GLOB 4.5*     No results for input(s): \"INR\", \"APTT\" in the last 72 hours.    Invalid input(s): \"PTP\"   No results for input(s): \"TIBC\" in the last 72 hours.    Invalid input(s): \"FE\", \"PSAT\", \"FERR\"   No results  found for: \"RBCF\"   No results for input(s): \"PH\", \"PCO2\", \"PO2\" in the last 72 hours.  No results for input(s): \"CPK\" in the last 72 hours.    Invalid input(s): \"CPKMB\", \"CKNDX\", \"TROIQ\"  No results found for: \"CHOL\", \"CHLST\", \"CHOLV\", \"HDL\", \"HDLC\", \"LDL\"  Lab Results   Component Value Date/Time    GLUCPOC 85 10/18/2019 11:28 AM     [unfilled]    Notes reviewed from all clinical/nonclinical/nursing services involved in patient's clinical care. Care coordination discussions were held with appropriate clinical/nonclinical/ nursing providers based on care coordination needs.         Patients current active Medications were reviewed, considered, added and adjusted based on the clinical condition today.      Home Medications were reconciled to the best of my ability given all available resources at the time of admission. Route is PO if not otherwise noted.      Admission Status:[id number]:::1}      Medications Reviewed:     Current Facility-Administered Medications   Medication Dose Route Frequency    sodium chloride flush 0.9 % injection 5-40 mL  5-40 mL IntraVENous 2 times per day    sodium chloride flush 0.9 % injection 5-40 mL  5-40 mL IntraVENous PRN    0.9 % sodium chloride infusion   IntraVENous PRN    acetaminophen (TYLENOL) tablet 650 mg  650 mg Oral Q6H PRN    Or    acetaminophen (TYLENOL) suppository 650 mg  650 mg Rectal Q6H PRN    morphine (PF) injection 4 mg  4 mg IntraVENous Q4H PRN    naloxone (NARCAN) injection 0.4 mg  0.4 mg IntraVENous PRN    cefTRIAXone (ROCEPHIN) 1,000 mg in sterile water 10 mL IV syringe  1,000 mg IntraVENous Q24H    [Held by provider] carvedilol (COREG) tablet 25 mg  25 mg Oral BID WC    predniSONE (DELTASONE) tablet 5 mg  5 mg Oral Daily    pantoprazole (PROTONIX) tablet 40 mg  40 mg Oral QAM AC    heparin (porcine) injection 5,000 Units  5,000 Units SubCUTAneous 3 times per day    lactated ringers IV soln infusion   IntraVENous Continuous    oxyCODONE (ROXICODONE) immediate release

## 2024-07-26 NOTE — ED NOTES
TRANSFER - OUT REPORT:    Verbal report given to ROSA MARIA Covarrubias on Bharath Calderón  being transferred to [facility] [address] [address] for routine progression of patient care       Report consisted of patient's Situation, Background, Assessment and   Recommendations(SBAR).     Information from the following report(s) Nurse Handoff Report, ED Encounter Summary, ED SBAR, MAR, and Recent Results was reviewed with the receiving nurse.    [facility] Fall Assessment:    Presents to emergency department  because of falls (Syncope, seizure, or loss of consciousness): No  Age > 70: No  Altered Mental Status, Intoxication with alcohol or substance confusion (Disorientation, impaired judgment, poor safety awaremess, or inability to follow instructions): No  Impaired Mobility: Ambulates or transfers with assistive devices or assistance; Unable to ambulate or transer.: No  Nursing Judgement: No          Lines:   Peripheral IV 07/25/24 Left;Anterior Antecubital (Active)        Opportunity for questions and clarification was provided.

## 2024-07-26 NOTE — ED NOTES
Patient updated on plan of care: Admission to [facility] Room 362 - Transport ETA ~ 0100. Patient verbalizes understanding. Patient noted to be febrile at 103.2 ED MD informed.

## 2024-07-26 NOTE — CONSULTS
[facility] ([facility])         Nephrology and Hypertension         Patient seen and examined. Full consult [id number]    ASSESSMENT:  LINNETTE- likely pre-renal vs early ATN in the setting of Urosepsis. CT A/P with no hydronephrosis of transplant kidney. UA with pyuria and protein of 100 on dipstick. Denies missing any IS meds.     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 Still has R AVG in place.   HTN>>Hypotension/tachycardia    PLAN:  Ct IV hydration with isotonic IVF (LR) at 125 ml/hr  IV Abx per 1ry team. Follow culture  Consider ID eval   Resume Prograf- ordered 4 mg BID  Check Prograf trough level in AM  HOLD MYFORTIC  (temporarily) due to active infection/sepsis-resume once infection resolves at time of d/c  Ct Prednisone 5 mg QD  If pt develops Rx resistant hypotension, can consider stress dose steroids  Monitor UOP  Daily labs  Hold home BP meds    D/W pt  Thanks for Renal consult. We will follow patient with you.    Signed by:  Sen Martinez MD  Nephrology and HTN

## 2024-07-26 NOTE — PLAN OF CARE
Problem: Discharge Planning  Goal: Discharge to home or other facility with appropriate resources  Outcome: Progressing  Flowsheets (Taken 7/26/2024 0131)  Discharge to home or other facility with appropriate resources: Identify barriers to discharge with patient and caregiver     Problem: Pain  Goal: Verbalizes/displays adequate comfort level or baseline comfort level  Outcome: Progressing     Problem: ABCDS Injury Assessment  Goal: Absence of physical injury  Outcome: Progressing

## 2024-07-26 NOTE — H&P
History and Physical    Date of Service:  7/26/2024  Primary Care Provider: Tamiko Hassan APRN - NP  Source of information: patient, electronic medical record    Chief Complaint: Urinary Frequency, Fever, and Back Pain      History of Presenting Illness:   Bharath Calderón is a 39 y.o. female with a pmhx ESRD 2/2 lupus nephritis s/p transplant, asthma, GERD, anemia, hypertension, and past VTE who presented to [facility] with increased urinary frequency and pelvic pain, and was admitted for urosepsis, and Jean.  Associated sx include myalgia, fever, and right flank pain.     In the ED, she was febrile to 103.2, and tachycardic to 115.  Other VSS.  Labs showed WBC 12.3, BUN 23, and creatinine 2.17.   UA showed moderated leukocyte esterase, 20-50 WBC, and 1+ bacteria, Urine culture ordered, and pending.    In the ED, she received IV morphine, ceftriaxone, tylenol, and 1L normal saline bolus.       REVIEW OF SYSTEMS:  A comprehensive review of systems was negative except for that written in the History of Present Illness.     Past Medical History:   Diagnosis Date    Anemia     secondary to lupus    Asthma     no inhaler use in past 2 to 3 years    Carditis     Chronic back pain     Chronic kidney disease     ESRD    Chronic pain     DDD (degenerative disc disease), lumbar     ESRD (end stage renal disease) (HCC)     Fibroid tumor     GERD (gastroesophageal reflux disease)     HCAP (healthcare-associated pneumonia) 7/16/2019    Hemodialysis patient (HCC) 12/21/2017    [facility]  Tuesday,  Thursday,  and Saturday.     Hypercholesterolemia     Hyperkalemia 3/4/2019    Hypertension     Hypokalemia 10/27/2017    Intra-abdominal abscess (HCC) 5/12/2018    Intractable nausea and vomiting 10/21/2015    Long term (current) use of anticoagulants     Lupus (systemic lupus erythematosus) (HCC)     Malignant hypertension with chronic kidney disease stage V (HCC)     Peritoneal dialysis status (HCC)  10/2015    x 2 years Stopped 2017 due to infection and removed.    Peritonitis (HCC) 3/11/2018    Pneumonia 3/14/2020    Poor historian 2018    With medications    Sepsis (HCC) 2018    Thromboembolus (HCC) 2013    lungs    Transfusion history     Last Transfusion 2017  at [facility]      Past Surgical History:   Procedure Laterality Date     SECTION  11/2006    x1    CT VISCERAL PERCUTANEOUS DRAIN  2018    CT VISCERAL PERCUTANEOUS DRAIN 2018 [facility] RAD CT    KIDNEY TRANSPLANT  2022    OTHER SURGICAL HISTORY  2015    INSERTION PD CATH; Removed 2017    US INSERT CATH PLEURA W IMAGE  2018    US INSERT CATH PLEURA W IMAGE 2018 [facility] RAD US    VASCULAR SURGERY Right 2018    right upper arm    VASCULAR SURGERY Right 2017    Double-Lumen kelly catheter upper chest     Prior to Admission medications    Medication Sig Start Date End Date Taking? Authorizing Provider   vitamin D (ERGOCALCIFEROL) 1.25 MG (69975 UT) CAPS capsule TAKE 1 CAPSULE BY MOUTH 1 TIME PER WEEK.    Provider, MD Osiel   albuterol sulfate HFA (PROVENTIL;VENTOLIN;PROAIR) 108 (90 Base) MCG/ACT inhaler Inhale 2 puffs into the lungs every 4 hours as needed for Wheezing 12/10/23   Megha-Landrum, April C, PA   amitriptyline (ELAVIL) 150 MG tablet Take 1 tablet by mouth nightly 10/9/23   Mike Aguirre MD   levonorgestrel (MIRENA, 52 MG,) IUD 52 mg 1 each by IntraUTERine route once    Provider, MD Osiel   amLODIPine (NORVASC) 5 MG tablet Take 2 tablets by mouth daily    Automatic Reconciliation, Ar   carvedilol (COREG) 25 MG tablet Take 1 tablet by mouth 2 times daily (with meals) 18   Automatic Reconciliation, Ar   fluticasone (FLONASE) 50 MCG/ACT nasal spray 1 spray by Nasal route as needed 10/31/22   Automatic Reconciliation, Ar   Mycophenolate Sodium 360 MG TBEC Take 0.5 tablets by mouth 2 times daily    Automatic Reconciliation, Ar   pantoprazole (PROTONIX) 40 MG tablet Take 1 tablet by

## 2024-07-26 NOTE — CONSULTS
[address]  [address]                              CONSULTATION      PATIENT NAME: CHATA CADET          : 1985  MED REC NO: [id number]                       ROOM: 362  ACCOUNT NO: [id number]                       ADMIT DATE: 2024  PROVIDER: Sen Martinez MD    DATE OF SERVICE:  2024    ATTENDING PHYSICIAN:  TREY CAMPBELL    For evaluation and management of kidney transplant and LINNETTE.    HISTORY OF PRESENT ILLNESS:  The patient is a 39-year-old female with past medical history significant for ESRD secondary to lupus nephritis, who was on PD in the past.  She received a cadaveric kidney transplant in 2022 and currently followed by [facility].  She is admitted with chief complaint of increased urinary frequency, pelvic pain, and found to have evidence of urosepsis and LINNETTE.  Associated symptoms include fever and some myalgias.  She was febrile with temperature of 103.2, borderline tachycardic.  Creatinine was 2.17 and has increased to 3 today.    She denies any vomiting or diarrhea.  Did not take any recent NSAIDs.  Denies missing any immunosuppressive medication.  Does not remember baseline creatinine, but reportedly it is \"good.\"  Based on review of records, looks like her creatinine was normal as of 2024.    In the ED, she was given a dose of IV morphine, started on ceftriaxone and got 1 L bolus.    She is feeling slightly better, but still has urinary frequency as well as pelvic pain.  She was started on prednisone.  Her Myfortic and Prograf have not been ordered.    REVIEW OF SYSTEMS:  As noted above in HPI.  Remainder is negative.    PHYSICAL EXAMINATION:  GENERAL:  Young female, well built, well nourished, in no acute distress.  VITALS:  T current 100.9, T-max of 103.2, tachycardia with pulse of 110 to 120,  to 108 systolic and 39 to 57 diastolic, respirations of 22, weight 124.2 kg.  HEENT:  Head  Yes No   Sig: Take 2 tablets by mouth   albuterol sulfate HFA (PROVENTIL;VENTOLIN;PROAIR) 108 (90 Base) MCG/ACT inhaler   No No   Sig: Inhale 2 puffs into the lungs every 4 hours as needed for Wheezing   amLODIPine (NORVASC) 5 MG tablet   Yes No   Sig: Take 2 tablets by mouth daily   amitriptyline (ELAVIL) 150 MG tablet   No No   Sig: Take 1 tablet by mouth nightly   carvedilol (COREG) 25 MG tablet   Yes No   Sig: Take 1 tablet by mouth 2 times daily (with meals)   fluticasone (FLONASE) 50 MCG/ACT nasal spray   Yes No   Si spray by Nasal route as needed   levonorgestrel (MIRENA, 52 MG,) IUD 52 mg   Yes No   Si each by IntraUTERine route once   pantoprazole (PROTONIX) 40 MG tablet   Yes No   Sig: Take 1 tablet by mouth every morning (before breakfast)   predniSONE (DELTASONE) 5 MG tablet   Yes Yes   Sig: Take 1 tablet by mouth daily   tacrolimus (PROGRAF) 1 MG capsule   Yes No   Sig: Take 4 capsules by mouth in the morning and 4 capsules in the evening.   vitamin D (ERGOCALCIFEROL) 1.25 MG (57836 UT) CAPS capsule   Yes No   Sig: TAKE 1 CAPSULE BY MOUTH 1 TIME PER WEEK.      Facility-Administered Medications: None     Social History     Tobacco Use    Smoking status: Never    Smokeless tobacco: Never   Substance Use Topics    Alcohol use: No    Drug use: Yes     Types: Prescription, OTC     Family History   Problem Relation Age of Onset    Diabetes Mother     Hypertension Mother     Hypertension Father     Diabetes Father     Cancer Maternal Aunt 56        breast CA    Cancer Maternal Aunt         Pasted of breast cancer    Cancer Paternal Aunt         breast ca    Cancer Paternal Uncle     Hypertension Maternal Grandmother     Diabetes Maternal Grandmother     Pacemaker Maternal Grandmother     Hypertension Maternal Grandfather     Kidney Disease Maternal Grandfather          REVIEW OF SYSTEMS:  As noted above in HPI.  Remainder is negative.    PHYSICAL EXAMINATION:  GENERAL:  Young female, well built, well

## 2024-07-27 ENCOUNTER — APPOINTMENT (OUTPATIENT)
Facility: HOSPITAL | Age: 39
End: 2024-07-27
Payer: MEDICARE

## 2024-07-27 LAB
ALBUMIN SERPL-MCNC: 2.4 G/DL (ref 3.5–5)
ANION GAP SERPL CALC-SCNC: 10 MMOL/L (ref 5–15)
ANION GAP SERPL CALC-SCNC: 10 MMOL/L (ref 5–15)
BASOPHILS # BLD: 0 K/UL (ref 0–0.1)
BASOPHILS NFR BLD: 0 % (ref 0–1)
BUN SERPL-MCNC: 39 MG/DL (ref 6–20)
BUN SERPL-MCNC: 40 MG/DL (ref 6–20)
BUN/CREAT SERPL: 11 (ref 12–20)
BUN/CREAT SERPL: 11 (ref 12–20)
CALCIUM SERPL-MCNC: 8.2 MG/DL (ref 8.5–10.1)
CALCIUM SERPL-MCNC: 8.3 MG/DL (ref 8.5–10.1)
CHLORIDE SERPL-SCNC: 101 MMOL/L (ref 97–108)
CHLORIDE SERPL-SCNC: 102 MMOL/L (ref 97–108)
CO2 SERPL-SCNC: 20 MMOL/L (ref 21–32)
CO2 SERPL-SCNC: 20 MMOL/L (ref 21–32)
CREAT SERPL-MCNC: 3.62 MG/DL (ref 0.55–1.02)
CREAT SERPL-MCNC: 3.62 MG/DL (ref 0.55–1.02)
DIFFERENTIAL METHOD BLD: ABNORMAL
EOSINOPHIL # BLD: 0 K/UL (ref 0–0.4)
EOSINOPHIL NFR BLD: 0 % (ref 0–7)
ERYTHROCYTE [DISTWIDTH] IN BLOOD BY AUTOMATED COUNT: 13.6 % (ref 11.5–14.5)
GLUCOSE SERPL-MCNC: 109 MG/DL (ref 65–100)
GLUCOSE SERPL-MCNC: 111 MG/DL (ref 65–100)
HCT VFR BLD AUTO: 31.4 % (ref 35–47)
HGB BLD-MCNC: 10.3 G/DL (ref 11.5–16)
IMM GRANULOCYTES # BLD AUTO: 0 K/UL
IMM GRANULOCYTES NFR BLD AUTO: 0 %
LYMPHOCYTES # BLD: 0.7 K/UL (ref 0.8–3.5)
LYMPHOCYTES NFR BLD: 5 % (ref 12–49)
MCH RBC QN AUTO: 29.2 PG (ref 26–34)
MCHC RBC AUTO-ENTMCNC: 32.8 G/DL (ref 30–36.5)
MCV RBC AUTO: 89 FL (ref 80–99)
MONOCYTES # BLD: 0.4 K/UL (ref 0–1)
MONOCYTES NFR BLD: 3 % (ref 5–13)
NEUTS SEG # BLD: 12.3 K/UL (ref 1.8–8)
NEUTS SEG NFR BLD: 92 % (ref 32–75)
NRBC # BLD: 0 K/UL (ref 0–0.01)
NRBC BLD-RTO: 0 PER 100 WBC
PHOSPHATE SERPL-MCNC: 2.7 MG/DL (ref 2.6–4.7)
PLATELET # BLD AUTO: 109 K/UL (ref 150–400)
PMV BLD AUTO: 10.4 FL (ref 8.9–12.9)
POTASSIUM SERPL-SCNC: 4.3 MMOL/L (ref 3.5–5.1)
POTASSIUM SERPL-SCNC: 4.3 MMOL/L (ref 3.5–5.1)
RBC # BLD AUTO: 3.53 M/UL (ref 3.8–5.2)
RBC MORPH BLD: ABNORMAL
SODIUM SERPL-SCNC: 131 MMOL/L (ref 136–145)
SODIUM SERPL-SCNC: 132 MMOL/L (ref 136–145)
WBC # BLD AUTO: 13.4 K/UL (ref 3.6–11)

## 2024-07-27 PROCEDURE — 6370000000 HC RX 637 (ALT 250 FOR IP): Performed by: HOSPITALIST

## 2024-07-27 PROCEDURE — 6370000000 HC RX 637 (ALT 250 FOR IP): Performed by: FAMILY MEDICINE

## 2024-07-27 PROCEDURE — 6360000002 HC RX W HCPCS: Performed by: HOSPITALIST

## 2024-07-27 PROCEDURE — 2580000003 HC RX 258: Performed by: HOSPITALIST

## 2024-07-27 PROCEDURE — 80048 BASIC METABOLIC PNL TOTAL CA: CPT

## 2024-07-27 PROCEDURE — 80069 RENAL FUNCTION PANEL: CPT

## 2024-07-27 PROCEDURE — 85025 COMPLETE CBC W/AUTO DIFF WBC: CPT

## 2024-07-27 PROCEDURE — 2580000003 HC RX 258: Performed by: FAMILY MEDICINE

## 2024-07-27 PROCEDURE — 80197 ASSAY OF TACROLIMUS: CPT

## 2024-07-27 PROCEDURE — 71045 X-RAY EXAM CHEST 1 VIEW: CPT

## 2024-07-27 PROCEDURE — 6360000002 HC RX W HCPCS: Performed by: FAMILY MEDICINE

## 2024-07-27 PROCEDURE — 2060000000 HC ICU INTERMEDIATE R&B

## 2024-07-27 PROCEDURE — 6360000002 HC RX W HCPCS: Performed by: INTERNAL MEDICINE

## 2024-07-27 RX ORDER — FUROSEMIDE 10 MG/ML
20 INJECTION INTRAMUSCULAR; INTRAVENOUS ONCE
Status: COMPLETED | OUTPATIENT
Start: 2024-07-27 | End: 2024-07-27

## 2024-07-27 RX ADMIN — WATER 2000 MG: 1 INJECTION INTRAMUSCULAR; INTRAVENOUS; SUBCUTANEOUS at 22:33

## 2024-07-27 RX ADMIN — PANTOPRAZOLE SODIUM 40 MG: 40 TABLET, DELAYED RELEASE ORAL at 06:32

## 2024-07-27 RX ADMIN — SODIUM CHLORIDE: 9 INJECTION, SOLUTION INTRAVENOUS at 08:25

## 2024-07-27 RX ADMIN — HEPARIN SODIUM 5000 UNITS: 5000 INJECTION INTRAVENOUS; SUBCUTANEOUS at 22:33

## 2024-07-27 RX ADMIN — TACROLIMUS 4 MG: 1 CAPSULE ORAL at 08:18

## 2024-07-27 RX ADMIN — MAGNESIUM HYDROXIDE 30 ML: 400 SUSPENSION ORAL at 15:41

## 2024-07-27 RX ADMIN — HEPARIN SODIUM 5000 UNITS: 5000 INJECTION INTRAVENOUS; SUBCUTANEOUS at 14:37

## 2024-07-27 RX ADMIN — ACETAMINOPHEN 650 MG: 325 TABLET ORAL at 23:56

## 2024-07-27 RX ADMIN — TACROLIMUS 4 MG: 1 CAPSULE ORAL at 20:43

## 2024-07-27 RX ADMIN — SODIUM CHLORIDE, POTASSIUM CHLORIDE, SODIUM LACTATE AND CALCIUM CHLORIDE: 600; 310; 30; 20 INJECTION, SOLUTION INTRAVENOUS at 07:07

## 2024-07-27 RX ADMIN — SODIUM CHLORIDE, PRESERVATIVE FREE 10 ML: 5 INJECTION INTRAVENOUS at 20:48

## 2024-07-27 RX ADMIN — SODIUM CHLORIDE, PRESERVATIVE FREE 10 ML: 5 INJECTION INTRAVENOUS at 08:19

## 2024-07-27 RX ADMIN — HEPARIN SODIUM 5000 UNITS: 5000 INJECTION INTRAVENOUS; SUBCUTANEOUS at 06:32

## 2024-07-27 RX ADMIN — MORPHINE SULFATE 4 MG: 4 INJECTION, SOLUTION INTRAMUSCULAR; INTRAVENOUS at 05:27

## 2024-07-27 RX ADMIN — PIPERACILLIN AND TAZOBACTAM 3375 MG: 3; .375 INJECTION, POWDER, FOR SOLUTION INTRAVENOUS at 14:37

## 2024-07-27 RX ADMIN — ACETAMINOPHEN 650 MG: 325 TABLET ORAL at 05:26

## 2024-07-27 RX ADMIN — FUROSEMIDE 20 MG: 10 INJECTION, SOLUTION INTRAMUSCULAR; INTRAVENOUS at 17:51

## 2024-07-27 RX ADMIN — MORPHINE SULFATE 4 MG: 4 INJECTION, SOLUTION INTRAMUSCULAR; INTRAVENOUS at 14:37

## 2024-07-27 RX ADMIN — SODIUM CHLORIDE, POTASSIUM CHLORIDE, SODIUM LACTATE AND CALCIUM CHLORIDE: 600; 310; 30; 20 INJECTION, SOLUTION INTRAVENOUS at 16:17

## 2024-07-27 RX ADMIN — PIPERACILLIN AND TAZOBACTAM 4500 MG: 4; .5 INJECTION, POWDER, LYOPHILIZED, FOR SOLUTION INTRAVENOUS at 08:27

## 2024-07-27 RX ADMIN — PREDNISONE 5 MG: 5 TABLET ORAL at 08:18

## 2024-07-27 ASSESSMENT — PAIN SCALES - GENERAL
PAINLEVEL_OUTOF10: 2
PAINLEVEL_OUTOF10: 10
PAINLEVEL_OUTOF10: 10
PAINLEVEL_OUTOF10: 8
PAINLEVEL_OUTOF10: 2

## 2024-07-27 ASSESSMENT — PAIN DESCRIPTION - LOCATION
LOCATION: SHOULDER;FLANK
LOCATION: SHOULDER;FLANK
LOCATION: GENERALIZED

## 2024-07-27 ASSESSMENT — PAIN DESCRIPTION - DESCRIPTORS
DESCRIPTORS: ACHING
DESCRIPTORS: ACHING;CRAMPING

## 2024-07-27 NOTE — PROGRESS NOTES
Renal Dosing/Monitoring  Medication: Zosyn for urosepsis   - LINNETTE  - H/O ESRD s/p transplant  Current regimen:  3.375 gm IV q6h  Recent Labs     07/26/24  0718 07/27/24  0626 07/27/24  0627   CREATININE 3.01*  2.99* 3.62* 3.62*   BUN 27*  27* 40* 39*     Estimated CrCl:  28 ml/min  Plan:   Dose adjusted based on extended infusion beta lactam protocol to:  Zosyn 4.5 gm LD over 30 minutes, followed by  3.375 gm IV q8h over 4 hours; first dose to be given 6 hours after LD.

## 2024-07-27 NOTE — PROGRESS NOTES
[facility]                                                                                          Hospitalist Progress Note  Holland Tavarez MD  Office Phone: [phone]        Date of Service:  2024  NAME:  Bharath Calderón  :  1985  MRN:  [id number]       Admission Summary:     Bharath Calderón is a 39 y.o. female with a pmhx ESRD 2/2 lupus nephritis s/p transplant, asthma, GERD, anemia, hypertension, and past VTE who presented to [facility] with increased urinary frequency and pelvic pain, and was admitted for urosepsis, and Jean.  Associated sx include myalgia, fever, and right flank pain.      In the ED, she was febrile to 103.2, and tachycardic to 115.  Other VSS.  Labs showed WBC 12.3, BUN 23, and creatinine 2.17.   UA showed moderated leukocyte esterase, 20-50 WBC, and 1+ bacteria, Urine culture ordered, and pending.     In the ED, she received IV morphine, ceftriaxone, tylenol, and 1L normal saline bolus           Interval history / Subjective:   Follow-up for UTI sepsis.  Patient seen and examined this afternoon.  She reports being short of breath, denies chest pain.  Continues to spike intermittent fever, tachycardia and intermittent tachypnea.  Creatinine up to 3.62.  Stat CXR obtained, shows diffuse interstitial haziness prominent on the bilateral lower lung fields (my personal review) official radiology report pending    Her family, daughter and cousin present in the room.     Assessment & Plan:     Sepsis due to urinary tract infection, sepsis evidenced by fever Tmax of 103.2, tachycardia, leukocytosis.  CT without hydronephrosis of the right transplanted kidney.  --Still tachycardic and febrile,  bolus and maintenance ordered.  -- Continue IV ceftriaxone  -- Follow-up blood cultures  -- Will consider stress dose steroid if she becomes hypotensive    Tachypnea with CXR suggestive of pulm edema  -- DC IV fluid  -- Lasix 20 mg IV x 1

## 2024-07-27 NOTE — PROGRESS NOTES
Name: Bharath Calderón   MRN: [id number]  : 1985    Nephrology Progress Note    Assessment:  LINNETTE- likely pre-renal vs early ATN in the setting of Urosepsis/Acute pyelonephritis. CT A/P with no hydronephrosis of transplant kidney. UA with pyuria and protein of 100 on dipstick. Denies missing any IS meds.      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 Still has R AVG in place.   HTN>>Hypotension/tachycardia    Serum Cr rising to 3.6mg/dl today.    Plan/Recommendations:  IVF  Okay with one time dose of Lasix 20mg  Change IV Zosyn to Cefepime  F/u Cx ID/Sens  Prograf 4 mg BID  Check Prograf trough level in AM  HOLD MYFORTIC  (temporarily) due to active infection/sepsis-resume once infection resolves at time of d/c  Ct Prednisone 5 mg QD  If pt develops Rx resistant hypotension, can consider stress dose steroids  Monitor UOP  Daily labs  Hold home BP meds    Subjective:  Reports feeling slightly better    ROS:   No nausea, no vomiting  No chest pain, + shortness of breath    Exam:  /81   Pulse (!) 105   Temp 98.2 °F (36.8 °C) (Oral)   Resp (!) 34   Ht 1.651 m (5' 5\")   Wt 126.3 kg (278 lb 7.1 oz)   SpO2 96%   BMI 46.34 kg/m²     Gen: NAD/Obese  HEENT:  AT/NC  Lungs/Chest wall: Clear. Equal BS. No respiratory distress  Cardiovascular: Regular rate, normal rhythm.   Abdomen/: Soft, TTP over RLQ transplant kidney  Ext: No peripheral edema  CNS: alert and awake.     Current Facility-Administered Medications   Medication Dose Route Frequency Provider Last Rate Last Admin    magnesium hydroxide (MILK OF MAGNESIA) 400 MG/5ML suspension 30 mL  30 mL

## 2024-07-27 NOTE — PROGRESS NOTES
Clinical Pharmacy Note - Extended Infusion Cefepime Dosing    This patient has been ordered cefepime at 2 g IV every 12 hours. P&T protocol allows automatic substitution to extended infusion cefepime and dose adjustment based on indication and renal function (adjustment required if creatinine clearance < 60 mL/min).    Indication: UTI    CrCl: 28    Per P&T protocol, the cefepime dosing will be adjusted to 1 g IV every 24 hours (each dose will be infused over 4 hours).    Please call pharmacy with any questions.

## 2024-07-28 ENCOUNTER — APPOINTMENT (OUTPATIENT)
Facility: HOSPITAL | Age: 39
End: 2024-07-28
Payer: MEDICARE

## 2024-07-28 LAB
ALBUMIN SERPL-MCNC: 2.4 G/DL (ref 3.5–5)
ANION GAP BLD CALC-SCNC: 10 (ref 10–20)
ANION GAP SERPL CALC-SCNC: 11 MMOL/L (ref 5–15)
ANION GAP SERPL CALC-SCNC: 9 MMOL/L (ref 5–15)
BACTERIA SPEC CULT: ABNORMAL
BASE DEFICIT BLD-SCNC: 3.4 MMOL/L
BDY SITE: ABNORMAL
BUN SERPL-MCNC: 45 MG/DL (ref 6–20)
BUN SERPL-MCNC: 46 MG/DL (ref 6–20)
BUN/CREAT SERPL: 13 (ref 12–20)
BUN/CREAT SERPL: 13 (ref 12–20)
CA-I BLD-MCNC: 1.23 MMOL/L (ref 1.12–1.32)
CALCIUM SERPL-MCNC: 8.7 MG/DL (ref 8.5–10.1)
CALCIUM SERPL-MCNC: 8.8 MG/DL (ref 8.5–10.1)
CC UR VC: ABNORMAL
CHLORIDE BLD-SCNC: 100 MMOL/L (ref 100–108)
CHLORIDE SERPL-SCNC: 101 MMOL/L (ref 97–108)
CHLORIDE SERPL-SCNC: 101 MMOL/L (ref 97–108)
CO2 BLD-SCNC: 22 MMOL/L (ref 19–24)
CO2 SERPL-SCNC: 18 MMOL/L (ref 21–32)
CO2 SERPL-SCNC: 19 MMOL/L (ref 21–32)
CREAT SERPL-MCNC: 3.55 MG/DL (ref 0.55–1.02)
CREAT SERPL-MCNC: 3.58 MG/DL (ref 0.55–1.02)
CREAT UR-MCNC: 3.4 MG/DL (ref 0.6–1.3)
FIO2 ON VENT: 6 %
GLUCOSE BLD STRIP.AUTO-MCNC: 104 MG/DL (ref 74–99)
GLUCOSE SERPL-MCNC: 114 MG/DL (ref 65–100)
GLUCOSE SERPL-MCNC: 115 MG/DL (ref 65–100)
HCO3 BLDA-SCNC: 22 MMOL/L
LACTATE BLD-SCNC: 1.01 MMOL/L (ref 0.4–2)
PCO2 BLD: 39.1 MMHG (ref 35–45)
PH BLD: 7.36 (ref 7.35–7.45)
PHOSPHATE SERPL-MCNC: 4 MG/DL (ref 2.6–4.7)
PO2 BLD: 83 MMHG (ref 80–100)
POTASSIUM BLD-SCNC: 4.8 MMOL/L (ref 3.5–5.5)
POTASSIUM SERPL-SCNC: 4.8 MMOL/L (ref 3.5–5.1)
POTASSIUM SERPL-SCNC: 5.1 MMOL/L (ref 3.5–5.1)
SAO2 % BLD: 96 %
SERVICE CMNT-IMP: ABNORMAL
SODIUM BLD-SCNC: 132 MMOL/L (ref 136–145)
SODIUM SERPL-SCNC: 129 MMOL/L (ref 136–145)
SODIUM SERPL-SCNC: 130 MMOL/L (ref 136–145)
SPECIMEN SITE: ABNORMAL

## 2024-07-28 PROCEDURE — 99223 1ST HOSP IP/OBS HIGH 75: CPT | Performed by: INTERNAL MEDICINE

## 2024-07-28 PROCEDURE — 6360000002 HC RX W HCPCS: Performed by: FAMILY MEDICINE

## 2024-07-28 PROCEDURE — 80197 ASSAY OF TACROLIMUS: CPT

## 2024-07-28 PROCEDURE — 2580000003 HC RX 258

## 2024-07-28 PROCEDURE — 82803 BLOOD GASES ANY COMBINATION: CPT

## 2024-07-28 PROCEDURE — 6360000002 HC RX W HCPCS: Performed by: HOSPITALIST

## 2024-07-28 PROCEDURE — 2580000003 HC RX 258: Performed by: FAMILY MEDICINE

## 2024-07-28 PROCEDURE — 82947 ASSAY GLUCOSE BLOOD QUANT: CPT

## 2024-07-28 PROCEDURE — 80069 RENAL FUNCTION PANEL: CPT

## 2024-07-28 PROCEDURE — 71045 X-RAY EXAM CHEST 1 VIEW: CPT

## 2024-07-28 PROCEDURE — 80048 BASIC METABOLIC PNL TOTAL CA: CPT

## 2024-07-28 PROCEDURE — 94640 AIRWAY INHALATION TREATMENT: CPT

## 2024-07-28 PROCEDURE — 84132 ASSAY OF SERUM POTASSIUM: CPT

## 2024-07-28 PROCEDURE — 6360000002 HC RX W HCPCS: Performed by: INTERNAL MEDICINE

## 2024-07-28 PROCEDURE — 6360000002 HC RX W HCPCS

## 2024-07-28 PROCEDURE — 6370000000 HC RX 637 (ALT 250 FOR IP): Performed by: HOSPITALIST

## 2024-07-28 PROCEDURE — 82330 ASSAY OF CALCIUM: CPT

## 2024-07-28 PROCEDURE — 84295 ASSAY OF SERUM SODIUM: CPT

## 2024-07-28 PROCEDURE — 6370000000 HC RX 637 (ALT 250 FOR IP): Performed by: FAMILY MEDICINE

## 2024-07-28 PROCEDURE — 2060000000 HC ICU INTERMEDIATE R&B

## 2024-07-28 RX ORDER — FUROSEMIDE 10 MG/ML
20 INJECTION INTRAMUSCULAR; INTRAVENOUS ONCE
Status: COMPLETED | OUTPATIENT
Start: 2024-07-28 | End: 2024-07-28

## 2024-07-28 RX ORDER — IPRATROPIUM BROMIDE AND ALBUTEROL SULFATE 2.5; .5 MG/3ML; MG/3ML
1 SOLUTION RESPIRATORY (INHALATION) ONCE
Status: COMPLETED | OUTPATIENT
Start: 2024-07-28 | End: 2024-07-28

## 2024-07-28 RX ORDER — IPRATROPIUM BROMIDE AND ALBUTEROL SULFATE 2.5; .5 MG/3ML; MG/3ML
1 SOLUTION RESPIRATORY (INHALATION) EVERY 4 HOURS PRN
Status: DISCONTINUED | OUTPATIENT
Start: 2024-07-28 | End: 2024-08-02 | Stop reason: HOSPADM

## 2024-07-28 RX ADMIN — IPRATROPIUM BROMIDE AND ALBUTEROL SULFATE 1 DOSE: .5; 3 SOLUTION RESPIRATORY (INHALATION) at 08:51

## 2024-07-28 RX ADMIN — FUROSEMIDE 20 MG: 10 INJECTION, SOLUTION INTRAMUSCULAR; INTRAVENOUS at 10:33

## 2024-07-28 RX ADMIN — MORPHINE SULFATE 4 MG: 4 INJECTION, SOLUTION INTRAMUSCULAR; INTRAVENOUS at 21:28

## 2024-07-28 RX ADMIN — PREDNISONE 5 MG: 5 TABLET ORAL at 08:30

## 2024-07-28 RX ADMIN — HEPARIN SODIUM 5000 UNITS: 5000 INJECTION INTRAVENOUS; SUBCUTANEOUS at 06:38

## 2024-07-28 RX ADMIN — HEPARIN SODIUM 5000 UNITS: 5000 INJECTION INTRAVENOUS; SUBCUTANEOUS at 21:17

## 2024-07-28 RX ADMIN — TACROLIMUS 4 MG: 1 CAPSULE ORAL at 21:18

## 2024-07-28 RX ADMIN — PANTOPRAZOLE SODIUM 40 MG: 40 TABLET, DELAYED RELEASE ORAL at 06:38

## 2024-07-28 RX ADMIN — HEPARIN SODIUM 5000 UNITS: 5000 INJECTION INTRAVENOUS; SUBCUTANEOUS at 13:16

## 2024-07-28 RX ADMIN — WATER 2000 MG: 1 INJECTION INTRAMUSCULAR; INTRAVENOUS; SUBCUTANEOUS at 13:15

## 2024-07-28 RX ADMIN — MORPHINE SULFATE 4 MG: 4 INJECTION, SOLUTION INTRAMUSCULAR; INTRAVENOUS at 10:05

## 2024-07-28 RX ADMIN — MORPHINE SULFATE 4 MG: 4 INJECTION, SOLUTION INTRAMUSCULAR; INTRAVENOUS at 01:38

## 2024-07-28 RX ADMIN — FUROSEMIDE 20 MG: 10 INJECTION, SOLUTION INTRAMUSCULAR; INTRAVENOUS at 08:36

## 2024-07-28 RX ADMIN — SODIUM CHLORIDE, PRESERVATIVE FREE 10 ML: 5 INJECTION INTRAVENOUS at 21:18

## 2024-07-28 RX ADMIN — SODIUM CHLORIDE, PRESERVATIVE FREE 10 ML: 5 INJECTION INTRAVENOUS at 08:33

## 2024-07-28 RX ADMIN — TACROLIMUS 4 MG: 1 CAPSULE ORAL at 08:30

## 2024-07-28 ASSESSMENT — PAIN SCALES - GENERAL
PAINLEVEL_OUTOF10: 5
PAINLEVEL_OUTOF10: 7
PAINLEVEL_OUTOF10: 7
PAINLEVEL_OUTOF10: 8
PAINLEVEL_OUTOF10: 7
PAINLEVEL_OUTOF10: 9

## 2024-07-28 ASSESSMENT — PAIN DESCRIPTION - DESCRIPTORS
DESCRIPTORS: ACHING;DISCOMFORT
DESCRIPTORS: ACHING
DESCRIPTORS: ACHING;DISCOMFORT
DESCRIPTORS: ACHING;DISCOMFORT

## 2024-07-28 ASSESSMENT — PAIN - FUNCTIONAL ASSESSMENT: PAIN_FUNCTIONAL_ASSESSMENT: PREVENTS OR INTERFERES SOME ACTIVE ACTIVITIES AND ADLS

## 2024-07-28 ASSESSMENT — PAIN DESCRIPTION - LOCATION
LOCATION: FLANK;BACK
LOCATION: ABDOMEN

## 2024-07-28 ASSESSMENT — PAIN DESCRIPTION - FREQUENCY: FREQUENCY: CONTINUOUS

## 2024-07-28 ASSESSMENT — PAIN DESCRIPTION - PAIN TYPE: TYPE: CHRONIC PAIN

## 2024-07-28 ASSESSMENT — PAIN DESCRIPTION - ORIENTATION
ORIENTATION: INNER
ORIENTATION: RIGHT
ORIENTATION: RIGHT

## 2024-07-28 ASSESSMENT — PAIN DESCRIPTION - ONSET: ONSET: ON-GOING

## 2024-07-28 ASSESSMENT — PAIN SCALES - WONG BAKER: WONGBAKER_NUMERICALRESPONSE: HURTS A LITTLE BIT

## 2024-07-28 NOTE — SIGNIFICANT EVENT
Rapid Response Note     07/28/24 at 0836    A rapid response was called on this patient for Shortness of breath.    Response    Rapid Response Team at the bedside for evaluation.    Dr. Tavarez responding at the bedside.    Karen RN is the primary nurse during this episode.    Situation    0836- The patient was noted to have tachypnea. She is alert and looks only mildly distressed.   ABG performed- looks stable.  Stat lab work drawn as well.  The patient will move to [facility] level care per Dr. Tavarez.    The patient was left in the care of her primary nursing team.    Rapid Response end time approximately 0850        Sepsis Screening  Are two or more SIRS criteria present? Yes SIRS Criteria: Heart rate (pulse) > 90 bpm and Respiration > 20/min    Is the patient's history suggestive of a new infection? No    Communication with provider:    Was a Code Sepsis called at this encounter? No        Bedside EPOC Lab Values  Lactic level is 1.0 mmol/L

## 2024-07-28 NOTE — PROGRESS NOTES
[facility]                                                                                          Hospitalist Progress Note  Holland Tavarez MD  Office Phone: [phone]        Date of Service:  2024  NAME:  Bharath Calderón  :  1985  MRN:  [id number]       Admission Summary:     Bharath Calderón is a 39 y.o. 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  Associated sx include myalgia, fever, and right flank pain.      In the ED, she was febrile to 103.2, and tachycardic to 115.  Other VSS.  Labs showed WBC 12.3, BUN 23, and creatinine 2.17.   UA showed moderated leukocyte esterase, 20-50 WBC, and 1+ bacteria, Urine culture ordered, and pending.     In the ED, she received IV morphine, ceftriaxone, tylenol, and 1L normal saline bolus           Interval history / Subjective:   Follow-up for UTI sepsis.  I came to see patient right away notified by RN that patient became hypoxic and is requiring higher oxygen.  Patient is alert and oriented, she is tachypneic, tachycardic and wheezing.  She is on oxygen by nasal cannula.  Phlebotomist at the bedside trying to collect a.m. labs.  Ordered a stat ABG, CXR, DuoNeb, Lasix  RRT team activated to expedite the process.  Will transfer patient to intermediate care as she may need BiPAP support if she continues to be in respiratory distress after treatment.  She is hemodynamically stable, no indication for ICU at this time but will continue to closely monitor.    Care plan discussed with patient, her family, RN and RRT team and nursing supervisors.    Of note, IV fluids were stopped yesterday and she had a dose of 20 mg IV Lasix.     Assessment & Plan:     Sepsis due to urinary tract infection, sepsis evidenced by fever Tmax of 103.2,  sodium chloride infusion   IntraVENous PRN    acetaminophen (TYLENOL) tablet 650 mg  650 mg Oral Q6H PRN    Or    acetaminophen (TYLENOL) suppository 650 mg  650 mg Rectal Q6H PRN    morphine (PF) injection 4 mg  4 mg IntraVENous Q4H PRN    naloxone (NARCAN) injection 0.4 mg  0.4 mg IntraVENous PRN    [Held by provider] carvedilol (COREG) tablet 25 mg  25 mg Oral BID WC    predniSONE (DELTASONE) tablet 5 mg  5 mg Oral Daily    pantoprazole (PROTONIX) tablet 40 mg  40 mg Oral QAM AC    heparin (porcine) injection 5,000 Units  5,000 Units SubCUTAneous 3 times per day    oxyCODONE (ROXICODONE) immediate release tablet 10 mg  10 mg Oral Q4H PRN    tacrolimus (PROGRAF) capsule 4 mg  4 mg Oral BID     ______________________________________________________________________  EXPECTED LENGTH OF STAY: 4  ACTUAL LENGTH OF STAY:          3                 Holland Tavarez MD

## 2024-07-28 NOTE — PROGRESS NOTES
Name: Bharath Calderón   MRN: [id number]  : 1985    Nephrology Progress Note    Assessment:  LINNETTE- likely pre-renal vs early ATN in the setting of Urosepsis/Acute pyelonephritis. CT A/P with no hydronephrosis of transplant kidney. UA with pyuria and protein of 100 on dipstick. Denies missing any IS meds.      S/P CKT (, [facility], RLQ)- basln Cr normal as off  based on review of records     Fever/Urosepsis/Leucocytosis: E.Coli acute pyelonephritis  Hyponatremia-mild  Metabolic acidosis: mild NAGMA  Hx of ESRD-2nd to Lupus nephritis-was on HD at [facility] unit. Still has R AVG in place.   HTN>>Hypotension/tachycardia    Serum Cr showing signs of plateau-> 3.6 to 3.5mg/dl today    Plan/Recommendations:  Ct current management  IV Cefepime  Prograf 4 mg BID-> level still pending  HOLD MYFORTIC  (temporarily) due to active infection/sepsis-resume once infection resolves at time of d/c  Ct Prednisone 5 mg QD  If pt develops Rx resistant hypotension, can consider stress dose steroids  Monitor UOP  Daily labs  Hold home BP meds    Subjective:  Reports feeling slightly better. SOB overnight -> transferred to IMCU  Less tender over transplant    ROS:   No nausea, no vomiting  No chest pain, + shortness of breath    Exam:  /77   Pulse 95   Temp 99.2 °F (37.3 °C) (Oral)   Resp 26   Ht 1.651 m (5' 5\")   Wt 126.9 kg (279 lb 12.2 oz)   SpO2 100%   BMI 46.56 kg/m²     Gen: NAD/Obese  HEENT:  AT/NC  Lungs/Chest wall: Clear. Equal BS. No respiratory distress  Cardiovascular: Regular rate, normal rhythm.   Abdomen/: Soft, No TTP over RLQ transplant kidney  Ext: No peripheral edema  CNS: alert and awake.     Current Facility-Administered Medications   Medication Dose Route Frequency Provider Last Rate Last Admin    ipratropium 0.5 mg-albuterol 2.5 mg (DUONEB)  nebulizer solution 1 Dose  1 Dose Inhalation Q4H PRN Holland Tavarez MD        cefTRIAXone (ROCEPHIN) 2,000 mg in sterile water 20 mL IV syringe  2,000 mg IntraVENous Q24H KinneyElizabet hunt APRN - NP   2,000 mg at 07/28/24 1315    magnesium hydroxide (MILK OF MAGNESIA) 400 MG/5ML suspension 30 mL  30 mL Oral Daily PRN Holland Tavarez MD   30 mL at 07/27/24 1541    sodium chloride flush 0.9 % injection 5-40 mL  5-40 mL IntraVENous 2 times per day Margaret Harris MD   10 mL at 07/28/24 0833    sodium chloride flush 0.9 % injection 5-40 mL  5-40 mL IntraVENous PRN Margaret Harris MD        0.9 % sodium chloride infusion   IntraVENous PRN Margaret Harris MD 5 mL/hr at 07/27/24 0825 New Bag at 07/27/24 0825    acetaminophen (TYLENOL) tablet 650 mg  650 mg Oral Q6H PRN Margaret Harris MD   650 mg at 07/27/24 2356    Or    acetaminophen (TYLENOL) suppository 650 mg  650 mg Rectal Q6H PRN Margaret Harris MD        morphine (PF) injection 4 mg  4 mg IntraVENous Q4H PRN Margaret Harris MD   4 mg at 07/28/24 1005    naloxone (NARCAN) injection 0.4 mg  0.4 mg IntraVENous PRN Margaret Harris MD        [Held by provider] carvedilol (COREG) tablet 25 mg  25 mg Oral BID WC Margaret Harris MD        predniSONE (DELTASONE) tablet 5 mg  5 mg Oral Daily Margaret Harris MD   5 mg at 07/28/24 0830    pantoprazole (PROTONIX) tablet 40 mg  40 mg Oral QAM AC Margaret Harris MD   40 mg at 07/28/24 0638    heparin (porcine) injection 5,000 Units  5,000 Units SubCUTAneous 3 times per day Margaret Harris MD   5,000 Units at 07/28/24 1316    oxyCODONE (ROXICODONE) immediate release tablet 10 mg  10 mg Oral Q4H PRN Holland Tavarez MD   10 mg at 07/26/24 1051    tacrolimus (PROGRAF) capsule 4 mg  4 mg Oral BID Sen Martinez MD   4 mg at 07/28/24 0830        Labs/Data:    Lab Results   Component Value Date    WBC 13.4 (H) 07/27/2024    HGB 10.3 (L) 07/27/2024    HCT 31.4 (L) 07/27/2024    MCV 89.0 07/27/2024     (L)  07/27/2024       Lab Results   Component Value Date/Time     07/28/2024 08:48 AM    K 5.1 07/28/2024 08:48 AM     07/28/2024 08:48 AM    CO2 18 07/28/2024 08:48 AM    BUN 46 07/28/2024 08:48 AM    CREATININE 3.58 07/28/2024 08:48 AM    CREATININE 3.4 07/28/2024 08:45 AM    GLUCOSE 114 07/28/2024 08:48 AM    GLUCOSE 101 05/31/2023 03:28 PM    CALCIUM 8.8 07/28/2024 08:48 AM    LABGLOM 16 07/28/2024 08:48 AM    LABGLOM >60 09/22/2023 08:55 PM    LABGLOM 73 05/31/2023 03:28 PM        Wt Readings from Last 3 Encounters:   07/28/24 126.9 kg (279 lb 12.2 oz)   07/11/24 122.5 kg (270 lb)   12/10/23 115.9 kg (255 lb 8.2 oz)         Intake/Output Summary (Last 24 hours) at 7/28/2024 1550  Last data filed at 7/28/2024 1456  Gross per 24 hour   Intake --   Output 1200 ml   Net -1200 ml         Patient seen and examined. Chart reviewed. Labs, data and other pertinent notes reviewed in last 24 hrs.    PMH/SH/FH reviewed and unchanged compared to H&P    Discussed with patient and family      Usama Martinez MD  [facility] ([facility])

## 2024-07-28 NOTE — CONSULTS
Infectious Disease Consult    INFECTIOUS DISEASE Attending:     I agree with the above infectious disease daily progress note in its entirety as authored by and discussed in detail with the nurse practitioner.   I have reviewed pertinent laboratory studies, microbiology cultures, radiologic reports with review of the consultations and progress notes as appropriate.   I agree with today's subjective findings, physical examination, assessment and plan of care as described above and discussed extensively with the nurse practitioner.       Continue Ceftriaxone while inpatient.    When ready for discharge, complete course with Levaquin 500mg PO every other day - adjust per renal function.    Last day of antibiotic therapy 8/7/24, inclusive.    Minimize immunosuppression, defer to nephrology.    Will see intermittently.    Impression/Plan     39 y.o. 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 Infectious disease services was consulted to assist with antibiotic management of sepsis, UTI in patient with right kidney transplant.     Complicated E. coli UTI/pyelonephritis in immunocompromised patient  Leukocytosis/fever  Hx right kidney transplant, on mycophenolate, Prograf, prednisone    Follow blood cultures.  Unfortunately collected after first dose of ceftriaxone was administered and therefore will likely remain negative.    Appreciate nephrology input and for holding Cellcept with active infection    E.coli sensitive to ceftriaxone, stop Zosyn and will start ceftriaxone IV 2 g for presumptive bacteremia. D/w Dr. Plata.     Follow WBC, fever curve improved    ESRD secondary to lupus nephritis  Nephrology following, was on HD           Anti-infectives:   cefepime  S/p ceftriaxone, Zosyn    Subjective:   Date of Consultation:  July 28, 2024  Date of Admission: 7/25/2024   Referring Physician: Holland  (L) >60 ml/min/1.73m2    Calcium 8.8 8.5 - 10.1 MG/DL    Phosphorus 4.0 2.6 - 4.7 MG/DL    Albumin 2.4 (L) 3.5 - 5.0 g/dL        Microbiology:      Studies:  CT Result (most recent):  CT ABDOMEN PELVIS WO IV CONTRAST 07/25/2024    Narrative  EXAM: CT ABDOMEN PELVIS WO CONTRAST    INDICATION: R sided abd pain, renal transplant    COMPARISON: 5/29/2021    IV CONTRAST: None.    ORAL CONTRAST: None    TECHNIQUE:  Thin axial images were obtained through the abdomen and pelvis. Coronal and  sagittal reformats were generated. CT dose reduction was achieved through use of  a standardized protocol tailored for this examination and automatic exposure  control for dose modulation.    The absence of intravenous contrast material reduces the sensitivity for  evaluation of the vasculature and solid organs.    FINDINGS:  LOWER THORAX: No significant abnormality in the incidentally imaged lower chest.  LIVER: No mass.  BILIARY TREE: Cholelithiasis. CBD is not dilated.  SPLEEN: within normal limits.  PANCREAS: No focal abnormality.  ADRENALS: Unremarkable.  KIDNEYS/URETERS: Atrophic bilateral kidneys. Transplanted kidney in the right  lower quadrant  STOMACH: Unremarkable.  SMALL BOWEL: No dilatation or wall thickening.  COLON: No dilatation or wall thickening.  APPENDIX: Unremarkable  PERITONEUM: No ascites or pneumoperitoneum.  RETROPERITONEUM: No lymphadenopathy or aortic aneurysm.  REPRODUCTIVE ORGANS: IUD. 3.8 cm cyst in the right ovary. 5.1 cm cyst in the  left ovary  URINARY BLADDER: Nondistended  BONES: No destructive bone lesion.  ABDOMINAL WALL: No mass or hernia.  ADDITIONAL COMMENTS: N/A    Impression  No acute intra-abdominal pathology. Bilateral cystic ovarian lesions which may  be physiologic in a patient of this age. Right renal transplant is noted.    Electronically signed by Samuel Daly        Signed By: WILEY Lea NP     July 28, 2024       A total time of 80 minutes was spent on today's encounter.

## 2024-07-28 NOTE — PROGRESS NOTES
Clinical Pharmacy Note: Ceftriaxone Dosing    Please note that the ceftriaxone dose for Bharath Calderón has been changed to 2 gm mg IV q24h per [facility]-approved protocol.  Please contact the pharmacy with any questions.    DARION ALEGRIA RPH

## 2024-07-28 NOTE — PLAN OF CARE
Problem: Discharge Planning  Goal: Discharge to home or other facility with appropriate resources  7/27/2024 2243 by Darcie Griffith RN  Outcome: Progressing  Flowsheets (Taken 7/27/2024 2243)  Discharge to home or other facility with appropriate resources:   Identify barriers to discharge with patient and caregiver   Arrange for needed discharge resources and transportation as appropriate   Identify discharge learning needs (meds, wound care, etc)   Refer to discharge planning if patient needs post-hospital services based on physician order or complex needs related to functional status, cognitive ability or social support system  7/27/2024 1038 by Marlene Hernandez RN  Outcome: Progressing     Problem: Pain  Goal: Verbalizes/displays adequate comfort level or baseline comfort level  7/27/2024 2243 by Darcie Griffith RN  Outcome: Progressing  Flowsheets (Taken 7/27/2024 2243)  Verbalizes/displays adequate comfort level or baseline comfort level:   Encourage patient to monitor pain and request assistance   Assess pain using appropriate pain scale   Administer analgesics based on type and severity of pain and evaluate response   Implement non-pharmacological measures as appropriate and evaluate response   Consider cultural and social influences on pain and pain management   Notify Licensed Independent Practitioner if interventions unsuccessful or patient reports new pain  7/27/2024 1038 by Marlene Hernandez RN  Outcome: Progressing     Problem: Safety - Adult  Goal: Free from fall injury  7/27/2024 2243 by Darcie Griffith, RN  Outcome: Progressing  Flowsheets (Taken 7/27/2024 2243)  Free From Fall Injury:   Instruct family/caregiver on patient safety   Based on caregiver fall risk screen, instruct family/caregiver to ask for assistance with transferring infant if caregiver noted to have fall risk factors  7/27/2024 1038 by Marlene Hernandez RN  Outcome: Progressing

## 2024-07-28 NOTE — PROGRESS NOTES
0836: Patient saturation drop to 86%,. She is in 2 lit oxygen. Her Heart rate 127b/m and Repartition is 45b/min. She does have shallow breathing. Nitify Provider and called Rapid response.     0930: Patient transfer to [facility].

## 2024-07-28 NOTE — PROGRESS NOTES
Spiritual Care Assessment/Progress Note  [facility]    Name: Bharath Calderón MRN: [id number]    Age: 39 y.o.     Sex: female   Language: English     Date: 7/28/2024            Total Time Calculated: 18 min              Spiritual Assessment begun in [facility]  Service Provided For: Patient and family together  Referral/Consult From: Rounding  Encounter Overview/Reason: Initial Encounter    Spiritual beliefs:      [x] Involved in a marta tradition/spiritual practice:      [x] Supported by a marta community:      [] Claims no spiritual orientation:      [] Seeking spiritual identity:           [] Adheres to an individual form of spirituality:      [] Not able to assess:                Identified resources for coping and support system:   Support System: Parent, Children, Family members, Temple/marta community       [x] Prayer                  [] Devotional reading               [] Music                  [] Guided Imagery     [] Pet visits                                        [] Other: (COMMENT)     Specific area/focus of visit   Encounter:    Crisis:    Spiritual/Emotional needs:    Ritual, Rites and Sacraments:    Grief, Loss, and Adjustments:    Ethics/Mediation:    Behavioral Health:    Palliative Care:    Advance Care Planning:           Narrative:    visited with patient who was resting in bed with her mother and daughter at bedside. She expressed that she was feeling better now. She described having support through her [employer] and family.  provided support through prayer.  Made patient aware that spiritual care services are available at any time upon their request.  Sole Heath  Intern  Spiritual Health Services  Paging service: [phone] (CINDY)

## 2024-07-29 LAB
ALBUMIN SERPL-MCNC: 2.4 G/DL (ref 3.5–5)
ALBUMIN/GLOB SERPL: 0.6 (ref 1.1–2.2)
ALP SERPL-CCNC: 93 U/L (ref 45–117)
ALT SERPL-CCNC: 36 U/L (ref 12–78)
ANION GAP SERPL CALC-SCNC: 9 MMOL/L (ref 5–15)
AST SERPL-CCNC: 65 U/L (ref 15–37)
BILIRUB SERPL-MCNC: 0.3 MG/DL (ref 0.2–1)
BUN SERPL-MCNC: 44 MG/DL (ref 6–20)
BUN/CREAT SERPL: 16 (ref 12–20)
CALCIUM SERPL-MCNC: 9.4 MG/DL (ref 8.5–10.1)
CHLORIDE SERPL-SCNC: 97 MMOL/L (ref 97–108)
CO2 SERPL-SCNC: 25 MMOL/L (ref 21–32)
CREAT SERPL-MCNC: 2.78 MG/DL (ref 0.55–1.02)
ERYTHROCYTE [DISTWIDTH] IN BLOOD BY AUTOMATED COUNT: 13.6 % (ref 11.5–14.5)
GLOBULIN SER CALC-MCNC: 4.3 G/DL (ref 2–4)
GLUCOSE SERPL-MCNC: 108 MG/DL (ref 65–100)
HCT VFR BLD AUTO: 34.1 % (ref 35–47)
HGB BLD-MCNC: 11.1 G/DL (ref 11.5–16)
MAGNESIUM SERPL-MCNC: 2.2 MG/DL (ref 1.6–2.4)
MCH RBC QN AUTO: 28.8 PG (ref 26–34)
MCHC RBC AUTO-ENTMCNC: 32.6 G/DL (ref 30–36.5)
MCV RBC AUTO: 88.3 FL (ref 80–99)
NRBC # BLD: 0 K/UL (ref 0–0.01)
NRBC BLD-RTO: 0 PER 100 WBC
PHOSPHATE SERPL-MCNC: 3 MG/DL (ref 2.6–4.7)
PLATELET # BLD AUTO: 151 K/UL (ref 150–400)
PMV BLD AUTO: 11.2 FL (ref 8.9–12.9)
POTASSIUM SERPL-SCNC: 4.3 MMOL/L (ref 3.5–5.1)
PROT SERPL-MCNC: 6.7 G/DL (ref 6.4–8.2)
RBC # BLD AUTO: 3.86 M/UL (ref 3.8–5.2)
SODIUM SERPL-SCNC: 131 MMOL/L (ref 136–145)
WBC # BLD AUTO: 9.5 K/UL (ref 3.6–11)

## 2024-07-29 PROCEDURE — 94760 N-INVAS EAR/PLS OXIMETRY 1: CPT

## 2024-07-29 PROCEDURE — 6370000000 HC RX 637 (ALT 250 FOR IP): Performed by: FAMILY MEDICINE

## 2024-07-29 PROCEDURE — 2060000000 HC ICU INTERMEDIATE R&B

## 2024-07-29 PROCEDURE — 2580000003 HC RX 258: Performed by: FAMILY MEDICINE

## 2024-07-29 PROCEDURE — 6360000002 HC RX W HCPCS

## 2024-07-29 PROCEDURE — 2580000003 HC RX 258

## 2024-07-29 PROCEDURE — 6360000002 HC RX W HCPCS: Performed by: INTERNAL MEDICINE

## 2024-07-29 PROCEDURE — 83735 ASSAY OF MAGNESIUM: CPT

## 2024-07-29 PROCEDURE — 6360000002 HC RX W HCPCS: Performed by: FAMILY MEDICINE

## 2024-07-29 PROCEDURE — 84100 ASSAY OF PHOSPHORUS: CPT

## 2024-07-29 PROCEDURE — 80053 COMPREHEN METABOLIC PANEL: CPT

## 2024-07-29 PROCEDURE — 6370000000 HC RX 637 (ALT 250 FOR IP): Performed by: HOSPITALIST

## 2024-07-29 PROCEDURE — 85027 COMPLETE CBC AUTOMATED: CPT

## 2024-07-29 PROCEDURE — 2700000000 HC OXYGEN THERAPY PER DAY

## 2024-07-29 PROCEDURE — 36415 COLL VENOUS BLD VENIPUNCTURE: CPT

## 2024-07-29 RX ADMIN — MORPHINE SULFATE 4 MG: 4 INJECTION, SOLUTION INTRAMUSCULAR; INTRAVENOUS at 06:41

## 2024-07-29 RX ADMIN — PANTOPRAZOLE SODIUM 40 MG: 40 TABLET, DELAYED RELEASE ORAL at 06:29

## 2024-07-29 RX ADMIN — WATER 2000 MG: 1 INJECTION INTRAMUSCULAR; INTRAVENOUS; SUBCUTANEOUS at 12:52

## 2024-07-29 RX ADMIN — SODIUM CHLORIDE, PRESERVATIVE FREE 10 ML: 5 INJECTION INTRAVENOUS at 21:45

## 2024-07-29 RX ADMIN — MORPHINE SULFATE 4 MG: 4 INJECTION, SOLUTION INTRAMUSCULAR; INTRAVENOUS at 21:45

## 2024-07-29 RX ADMIN — HEPARIN SODIUM 5000 UNITS: 5000 INJECTION INTRAVENOUS; SUBCUTANEOUS at 21:45

## 2024-07-29 RX ADMIN — TACROLIMUS 4 MG: 1 CAPSULE ORAL at 21:39

## 2024-07-29 RX ADMIN — OXYCODONE 10 MG: 5 TABLET ORAL at 12:48

## 2024-07-29 RX ADMIN — HEPARIN SODIUM 5000 UNITS: 5000 INJECTION INTRAVENOUS; SUBCUTANEOUS at 06:29

## 2024-07-29 RX ADMIN — PREDNISONE 5 MG: 5 TABLET ORAL at 08:26

## 2024-07-29 RX ADMIN — TACROLIMUS 4 MG: 1 CAPSULE ORAL at 08:26

## 2024-07-29 RX ADMIN — HEPARIN SODIUM 5000 UNITS: 5000 INJECTION INTRAVENOUS; SUBCUTANEOUS at 17:26

## 2024-07-29 RX ADMIN — SODIUM CHLORIDE, PRESERVATIVE FREE 10 ML: 5 INJECTION INTRAVENOUS at 08:28

## 2024-07-29 ASSESSMENT — PAIN DESCRIPTION - LOCATION
LOCATION: ABDOMEN

## 2024-07-29 ASSESSMENT — PAIN SCALES - GENERAL
PAINLEVEL_OUTOF10: 7
PAINLEVEL_OUTOF10: 0
PAINLEVEL_OUTOF10: 3
PAINLEVEL_OUTOF10: 8
PAINLEVEL_OUTOF10: 2
PAINLEVEL_OUTOF10: 4
PAINLEVEL_OUTOF10: 4
PAINLEVEL_OUTOF10: 5
PAINLEVEL_OUTOF10: 6
PAINLEVEL_OUTOF10: 0

## 2024-07-29 ASSESSMENT — PAIN SCALES - WONG BAKER: WONGBAKER_NUMERICALRESPONSE: HURTS A LITTLE BIT

## 2024-07-29 ASSESSMENT — PAIN DESCRIPTION - DESCRIPTORS
DESCRIPTORS: DULL;ACHING
DESCRIPTORS: ACHING;DISCOMFORT

## 2024-07-29 ASSESSMENT — PAIN DESCRIPTION - ORIENTATION
ORIENTATION: RIGHT
ORIENTATION: RIGHT
ORIENTATION: RIGHT;UPPER

## 2024-07-29 NOTE — PROGRESS NOTES
Name: Bharath Calderón   MRN: [id number]  : 1985    Nephrology Progress Note    Assessment:  LINNETTE- likely pre-renal vs early ATN in the setting of Urosepsis/Acute pyelonephritis. CT A/P with no hydronephrosis of transplant kidney. UA with pyuria and protein of 100 on dipstick. Denies missing any IS meds.      S/P CKT (, [facility], RLQ)- basln Cr normal as off  based on review of records     Fever/Urosepsis/Leucocytosis: E.Coli acute pyelonephritis  Hyponatremia-mild  Metabolic acidosis: mild NAGMA  Hx of ESRD-2nd to Lupus nephritis-was on HD at [facility] unit. Still has R AVG in place.   HTN>>Hypotension/tachycardia    Serum Cr showing improvement now to 2.78mg/dl today. Serum Na 131.    Plan/Recommendations:  Curb water intake some  IV Cefepime  Prograf 4 mg BID-> level still pending  HOLD MYFORTIC  (temporarily) due to active infection/sepsis-resume once infection resolves at time of d/c  Ct Prednisone 5 mg QD  Monitor UOP  Daily labs      Subjective:  Reports feeling much better SOB much better  Improved tenderness over transplant    ROS:   No nausea, no vomiting  No chest pain, + shortness of breath    Exam:  /69   Pulse 95   Temp 98.3 °F (36.8 °C) (Oral)   Resp 24   Ht 1.651 m (5' 5\")   Wt 125.2 kg (276 lb 0.3 oz)   SpO2 96%   BMI 45.93 kg/m²     Gen: NAD/Obese  HEENT:  AT/NC  Lungs/Chest wall: Clear. Equal BS. No respiratory distress  Cardiovascular: Regular rate, normal rhythm.   Abdomen/: Soft, No TTP over RLQ transplant kidney  Ext: No peripheral edema  CNS: alert and awake.     Current Facility-Administered Medications   Medication Dose Route Frequency Provider Last Rate Last Admin    ipratropium 0.5 mg-albuterol 2.5 mg (DUONEB) nebulizer solution 1 Dose  1 Dose Inhalation Q4H PRN Holland Tavarez MD        cefTRIAXone (ROCEPHIN) 2,000

## 2024-07-29 NOTE — PROGRESS NOTES
[facility]                                                                                          Hospitalist Progress Note  Holland Tavarez MD  Office Phone: [phone]        Date of Service:  2024  NAME:  Bharath Calderón  :  1985  MRN:  [id number]       Admission Summary:     Bharath Calderón is a 39 y.o. 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  Associated sx include myalgia, fever, and right flank pain.      In the ED, she was febrile to 103.2, and tachycardic to 115.  Other VSS.  Labs showed WBC 12.3, BUN 23, and creatinine 2.17.   UA showed moderated leukocyte esterase, 20-50 WBC, and 1+ bacteria, Urine culture ordered, and pending.     In the ED, she received IV morphine, ceftriaxone, tylenol, and 1L normal saline bolus           Interval history / Subjective:   Follow-up for UTI sepsis.  Patient seen and examined this afternoon.  She is feeling much better today, she is on room air with SpO2 95% during my exam.  Her creatinine is improving     Assessment & Plan:     Sepsis due to urinary tract infection, sepsis evidenced by fever Tmax of 103.2, tachycardia, leukocytosis.  CT without hydronephrosis of the right transplanted kidney.  Urine culture grew E. coli  -- SIRS resolved  -- Continue IV ceftriaxone  -- Blood cultures negative  --ID following      Acute hypoxic respiratory failure, wheezing, dyspneic and hypoxic   Fluid overload/acute pulm edema  -- Resolved with IV diuretics.           #ESRD due to lupus nephritis, she has been on hemodialysis for 7 years and is now status post kidney transplant  #LINNETTE, prerenal versus ATN due to sepsis.  Worsening.  -Continue aggressive IV hydration.  Monitor creatinine, input and output.  -Nephrology consulted, advised        Review of Systems:   Pertinent items are noted in HPI.       Vital Signs:    Last 24hrs VS reviewed since prior progress note. Most recent are:  Vitals:    07/29/24 1849   BP: (!) 132/102   Pulse: 94   Resp: 26   Temp: 97.7 °F (36.5 °C)   SpO2: 100%         Intake/Output Summary (Last 24 hours) at 7/29/2024 1921  Last data filed at 7/29/2024 1244  Gross per 24 hour   Intake 800 ml   Output 1750 ml   Net -950 ml          Physical Examination:     I had a face to face encounter with this patient and independently examined them on 7/29/2024 as outlined below:          General : Alert oriented x 3, not in distress  HEENT: PEERL, EOMI, moist mucus membrane, TM clear  Neck: supple, no JVD, no meningeal signs  Chest: On room air, clear lungs.  CVS: S1 S2 heard, Capillary refill less than 2 seconds  Abd: soft/ non tender, non distended, BS physiological,    Right lower quadrant tenderness (where she has the he transplanted kidney)  Ext: no clubbing, no cyanosis, no edema, brisk 2+ DP pulses  Neuro/Psych: pleasant mood and affect, CN 2-12 grossly intact, sensory grossly within normal limit, Strength 5/5 in all extremities, DTR 1+ x 4  Skin: warm     Data Review:    Review and/or order of clinical lab test  Review and/or order of tests in the radiology section of CPT  Review and/or order of tests in the medicine section of CPT      I have personally and independently reviewed all pertinent labs, diagnostic studies, imaging, and have provided independent interpretation of the same.     Labs:     Recent Labs     07/27/24  0626 07/29/24  0620   WBC 13.4* 9.5   HGB 10.3* 11.1*   HCT 31.4* 34.1*   * 151       Recent Labs     07/27/24  0627 07/28/24  0847 07/28/24  0848 07/29/24  0648   * 129* 130* 131*   K 4.3 4.8 5.1 4.3    101 101 97   CO2 20* 19* 18* 25   BUN 39* 45* 46* 44*   MG  --   --   --  2.2   PHOS 2.7  --  4.0 3.0       Recent Labs     07/29/24  0648   ALT 36   GLOB 4.3*       No results for     [Held by provider] carvedilol (COREG) tablet 25 mg  25 mg Oral BID WC    predniSONE (DELTASONE) tablet 5 mg  5 mg Oral Daily    pantoprazole (PROTONIX) tablet 40 mg  40 mg Oral QAM AC    heparin (porcine) injection 5,000 Units  5,000 Units SubCUTAneous 3 times per day    oxyCODONE (ROXICODONE) immediate release tablet 10 mg  10 mg Oral Q4H PRN    tacrolimus (PROGRAF) capsule 4 mg  4 mg Oral BID     ______________________________________________________________________  EXPECTED LENGTH OF STAY: 6  ACTUAL LENGTH OF STAY:          4                 Holland Tavarez MD

## 2024-07-30 ENCOUNTER — APPOINTMENT (OUTPATIENT)
Facility: HOSPITAL | Age: 39
End: 2024-07-30
Payer: MEDICARE

## 2024-07-30 LAB
ALBUMIN SERPL-MCNC: 2.3 G/DL (ref 3.5–5)
ALBUMIN/GLOB SERPL: 0.4 (ref 1.1–2.2)
ALP SERPL-CCNC: 124 U/L (ref 45–117)
ALT SERPL-CCNC: 43 U/L (ref 12–78)
ANION GAP SERPL CALC-SCNC: 6 MMOL/L (ref 5–15)
AST SERPL-CCNC: 103 U/L (ref 15–37)
BILIRUB SERPL-MCNC: 0.5 MG/DL (ref 0.2–1)
BUN SERPL-MCNC: 43 MG/DL (ref 6–20)
BUN/CREAT SERPL: 18 (ref 12–20)
CALCIUM SERPL-MCNC: 10.2 MG/DL (ref 8.5–10.1)
CHLORIDE SERPL-SCNC: 101 MMOL/L (ref 97–108)
CO2 SERPL-SCNC: 24 MMOL/L (ref 21–32)
CREAT SERPL-MCNC: 2.33 MG/DL (ref 0.55–1.02)
ERYTHROCYTE [DISTWIDTH] IN BLOOD BY AUTOMATED COUNT: 13.6 % (ref 11.5–14.5)
GLOBULIN SER CALC-MCNC: 6 G/DL (ref 2–4)
GLUCOSE SERPL-MCNC: 113 MG/DL (ref 65–100)
HCT VFR BLD AUTO: 37.2 % (ref 35–47)
HGB BLD-MCNC: 12 G/DL (ref 11.5–16)
MAGNESIUM SERPL-MCNC: 2.5 MG/DL (ref 1.6–2.4)
MCH RBC QN AUTO: 28.7 PG (ref 26–34)
MCHC RBC AUTO-ENTMCNC: 32.3 G/DL (ref 30–36.5)
MCV RBC AUTO: 89 FL (ref 80–99)
NRBC # BLD: 0 K/UL (ref 0–0.01)
NRBC BLD-RTO: 0 PER 100 WBC
PHOSPHATE SERPL-MCNC: 3.2 MG/DL (ref 2.6–4.7)
PLATELET # BLD AUTO: 203 K/UL (ref 150–400)
PMV BLD AUTO: 10.8 FL (ref 8.9–12.9)
POTASSIUM SERPL-SCNC: ABNORMAL MMOL/L (ref 3.5–5.1)
PROT SERPL-MCNC: 8.3 G/DL (ref 6.4–8.2)
RBC # BLD AUTO: 4.18 M/UL (ref 3.8–5.2)
SODIUM SERPL-SCNC: 131 MMOL/L (ref 136–145)
TACROLIMUS BLD-MCNC: 4.2 NG/ML (ref 2–20)
TACROLIMUS BLD-MCNC: 4.4 NG/ML (ref 2–20)
WBC # BLD AUTO: 12.3 K/UL (ref 3.6–11)

## 2024-07-30 PROCEDURE — 2580000003 HC RX 258: Performed by: FAMILY MEDICINE

## 2024-07-30 PROCEDURE — 83735 ASSAY OF MAGNESIUM: CPT

## 2024-07-30 PROCEDURE — 6360000002 HC RX W HCPCS: Performed by: FAMILY MEDICINE

## 2024-07-30 PROCEDURE — 6360000002 HC RX W HCPCS: Performed by: HOSPITALIST

## 2024-07-30 PROCEDURE — C1751 CATH, INF, PER/CENT/MIDLINE: HCPCS

## 2024-07-30 PROCEDURE — 36415 COLL VENOUS BLD VENIPUNCTURE: CPT

## 2024-07-30 PROCEDURE — 85027 COMPLETE CBC AUTOMATED: CPT

## 2024-07-30 PROCEDURE — 6370000000 HC RX 637 (ALT 250 FOR IP): Performed by: HOSPITALIST

## 2024-07-30 PROCEDURE — 76937 US GUIDE VASCULAR ACCESS: CPT

## 2024-07-30 PROCEDURE — 2700000000 HC OXYGEN THERAPY PER DAY

## 2024-07-30 PROCEDURE — 94760 N-INVAS EAR/PLS OXIMETRY 1: CPT

## 2024-07-30 PROCEDURE — 94640 AIRWAY INHALATION TREATMENT: CPT

## 2024-07-30 PROCEDURE — 6360000002 HC RX W HCPCS

## 2024-07-30 PROCEDURE — 80053 COMPREHEN METABOLIC PANEL: CPT

## 2024-07-30 PROCEDURE — 6360000002 HC RX W HCPCS: Performed by: INTERNAL MEDICINE

## 2024-07-30 PROCEDURE — 84100 ASSAY OF PHOSPHORUS: CPT

## 2024-07-30 PROCEDURE — 2060000000 HC ICU INTERMEDIATE R&B

## 2024-07-30 PROCEDURE — 71045 X-RAY EXAM CHEST 1 VIEW: CPT

## 2024-07-30 PROCEDURE — 6370000000 HC RX 637 (ALT 250 FOR IP): Performed by: FAMILY MEDICINE

## 2024-07-30 PROCEDURE — 2580000003 HC RX 258

## 2024-07-30 RX ORDER — FUROSEMIDE 10 MG/ML
20 INJECTION INTRAMUSCULAR; INTRAVENOUS ONCE
Status: COMPLETED | OUTPATIENT
Start: 2024-07-30 | End: 2024-07-30

## 2024-07-30 RX ADMIN — PREDNISONE 5 MG: 5 TABLET ORAL at 08:41

## 2024-07-30 RX ADMIN — HEPARIN SODIUM 5000 UNITS: 5000 INJECTION INTRAVENOUS; SUBCUTANEOUS at 16:19

## 2024-07-30 RX ADMIN — WATER 2000 MG: 1 INJECTION INTRAMUSCULAR; INTRAVENOUS; SUBCUTANEOUS at 13:07

## 2024-07-30 RX ADMIN — SODIUM CHLORIDE, PRESERVATIVE FREE 10 ML: 5 INJECTION INTRAVENOUS at 21:21

## 2024-07-30 RX ADMIN — TACROLIMUS 4 MG: 1 CAPSULE ORAL at 21:22

## 2024-07-30 RX ADMIN — TACROLIMUS 4 MG: 1 CAPSULE ORAL at 08:42

## 2024-07-30 RX ADMIN — OXYCODONE 10 MG: 5 TABLET ORAL at 03:28

## 2024-07-30 RX ADMIN — IPRATROPIUM BROMIDE AND ALBUTEROL SULFATE 1 DOSE: .5; 3 SOLUTION RESPIRATORY (INHALATION) at 09:46

## 2024-07-30 RX ADMIN — FUROSEMIDE 20 MG: 10 INJECTION, SOLUTION INTRAMUSCULAR; INTRAVENOUS at 14:23

## 2024-07-30 RX ADMIN — HEPARIN SODIUM 5000 UNITS: 5000 INJECTION INTRAVENOUS; SUBCUTANEOUS at 08:42

## 2024-07-30 RX ADMIN — MORPHINE SULFATE 4 MG: 4 INJECTION, SOLUTION INTRAMUSCULAR; INTRAVENOUS at 21:22

## 2024-07-30 RX ADMIN — MORPHINE SULFATE 4 MG: 4 INJECTION, SOLUTION INTRAMUSCULAR; INTRAVENOUS at 14:30

## 2024-07-30 RX ADMIN — PANTOPRAZOLE SODIUM 40 MG: 40 TABLET, DELAYED RELEASE ORAL at 08:42

## 2024-07-30 RX ADMIN — HEPARIN SODIUM 5000 UNITS: 5000 INJECTION INTRAVENOUS; SUBCUTANEOUS at 21:21

## 2024-07-30 ASSESSMENT — PAIN SCALES - GENERAL
PAINLEVEL_OUTOF10: 7
PAINLEVEL_OUTOF10: 0
PAINLEVEL_OUTOF10: 4
PAINLEVEL_OUTOF10: 8
PAINLEVEL_OUTOF10: 2
PAINLEVEL_OUTOF10: 8

## 2024-07-30 ASSESSMENT — PAIN DESCRIPTION - LOCATION
LOCATION: PELVIS
LOCATION: GROIN;HIP
LOCATION: ABDOMEN

## 2024-07-30 ASSESSMENT — PAIN DESCRIPTION - DESCRIPTORS
DESCRIPTORS: ACHING;DISCOMFORT;SORE
DESCRIPTORS: ACHING

## 2024-07-30 ASSESSMENT — PAIN DESCRIPTION - ORIENTATION
ORIENTATION: RIGHT
ORIENTATION: RIGHT;UPPER

## 2024-07-30 NOTE — PROCEDURES
PROCEDURE NOTE  Date: 7/30/2024   Name: Bharath Calderón  YOB: 1985    Procedures:    An extended dwell PIV (PowerGlide) was placed using ultrasound guidance. The IV flushed easily and had brisk blood return. The patient tolerated the procedure well.      Catheter Size: 20 g   Total length: 8 cm  External length: 0 cm  Location: left FA      Lot#: [id number]    May be used for blood draws--flush, waste 3ml, draw labs, flush post lab draw with at least 10ml NS and clamp if not infusing to maintain patency. (AccuCath is power injectable to 6 mL/sec at 300 psi.)     GEORGE NIELSEN, RN  Vascular Access Nurse      left FA

## 2024-07-30 NOTE — PLAN OF CARE
Problem: Discharge Planning  Goal: Discharge to home or other facility with appropriate resources  Outcome: Progressing     Problem: Pain  Goal: Verbalizes/displays adequate comfort level or baseline comfort level  Outcome: Progressing     Problem: ABCDS Injury Assessment  Goal: Absence of physical injury  Outcome: Progressing     Problem: Safety - Adult  Goal: Free from fall injury  Outcome: Progressing     Problem: Respiratory - Adult  Goal: Achieves optimal ventilation and oxygenation  Outcome: Progressing     Problem: Cardiovascular - Adult  Goal: Maintains optimal cardiac output and hemodynamic stability  Outcome: Progressing     Problem: Gastrointestinal - Adult  Goal: Maintains or returns to baseline bowel function  Outcome: Progressing     Problem: Genitourinary - Adult  Goal: Absence of urinary retention  Outcome: Progressing     Problem: Metabolic/Fluid and Electrolytes - Adult  Goal: Electrolytes maintained within normal limits  Outcome: Progressing  Goal: Hemodynamic stability and optimal renal function maintained  Outcome: Progressing     Problem: Skin/Tissue Integrity  Goal: Absence of new skin breakdown  Description: 1.  Monitor for areas of redness and/or skin breakdown  2.  Assess vascular access sites hourly  3.  Every 4-6 hours minimum:  Change oxygen saturation probe site  4.  Every 4-6 hours:  If on nasal continuous positive airway pressure, respiratory therapy assess nares and determine need for appliance change or resting period.  Outcome: Progressing

## 2024-07-30 NOTE — PROGRESS NOTES
Name: Bhraath Calderón   MRN: [id number]  : 1985    Nephrology Progress Note    Assessment:  LINNETTE- likely pre-renal vs early ATN in the setting of Urosepsis/Acute pyelonephritis. CT A/P with no hydronephrosis of transplant kidney. UA with pyuria and protein of 100 on dipstick. Denies missing any IS meds.      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 Still has R AVG in place.   HTN>>Hypotension/tachycardia    Serum Cr showing improvement now to 2.3mg/dl today. Serum Na 131. Ca 10.2    Plan/Recommendations:  Curb water intake some  IV Cefepime  Prograf 4 mg BID-> level still pending  HOLD MYFORTIC  (temporarily) due to active infection/sepsis-resume once infection resolves at time of d/c  Ct Prednisone 5 mg QD  Monitor UOP  Daily labs      Subjective:  Reports feeling much better. Not requiring O2.  Improved tenderness over transplant. Appetite improving    ROS:   No nausea, no vomiting  No chest pain, No shortness of breath    Exam:  /82   Pulse 98   Temp 98.6 °F (37 °C) (Oral)   Resp 18   Ht 1.651 m (5' 5\")   Wt 125.2 kg (276 lb 0.3 oz)   SpO2 93%   BMI 45.93 kg/m²     Gen: NAD/Obese  HEENT:  AT/NC  Lungs/Chest wall: Clear. Equal BS. No respiratory distress  Cardiovascular: Regular rate, normal rhythm.   Abdomen/: Soft, No TTP over RLQ transplant kidney  Ext: No peripheral edema  CNS: alert and awake.     Current Facility-Administered Medications   Medication Dose Route Frequency Provider Last Rate Last Admin    ipratropium 0.5 mg-albuterol 2.5 mg (DUONEB) nebulizer solution 1 Dose  1 Dose Inhalation Q4H PRN Holland Tavarez MD   1  Dose at 07/30/24 0946    cefTRIAXone (ROCEPHIN) 2,000 mg in sterile water 20 mL IV syringe  2,000 mg IntraVENous Q24H KinneyNathan hunteWILEY - NP   2,000 mg at 07/30/24 1307    magnesium hydroxide (MILK OF MAGNESIA) 400 MG/5ML suspension 30 mL  30 mL Oral Daily PRN Holland Tavarez MD   30 mL at 07/27/24 1541    sodium chloride flush 0.9 % injection 5-40 mL  5-40 mL IntraVENous 2 times per day Margaret Harris MD   10 mL at 07/29/24 2145    sodium chloride flush 0.9 % injection 5-40 mL  5-40 mL IntraVENous PRN Margaret Harris MD        0.9 % sodium chloride infusion   IntraVENous PRN Margaret Harris MD 5 mL/hr at 07/27/24 0825 New Bag at 07/27/24 0825    acetaminophen (TYLENOL) tablet 650 mg  650 mg Oral Q6H PRN Margaret Harris MD   650 mg at 07/27/24 2356    Or    acetaminophen (TYLENOL) suppository 650 mg  650 mg Rectal Q6H PRN Margaret Harris MD        morphine (PF) injection 4 mg  4 mg IntraVENous Q4H PRN Margaret Harris MD   4 mg at 07/30/24 1430    naloxone (NARCAN) injection 0.4 mg  0.4 mg IntraVENous PRN Margaret Harris MD        [Held by provider] carvedilol (COREG) tablet 25 mg  25 mg Oral BID WC Margaret Harris MD        predniSONE (DELTASONE) tablet 5 mg  5 mg Oral Daily Margaret Harris MD   5 mg at 07/30/24 0841    pantoprazole (PROTONIX) tablet 40 mg  40 mg Oral QAM AC Margaret Harris MD   40 mg at 07/30/24 0842    heparin (porcine) injection 5,000 Units  5,000 Units SubCUTAneous 3 times per day Margaret Harris MD   5,000 Units at 07/30/24 0842    oxyCODONE (ROXICODONE) immediate release tablet 10 mg  10 mg Oral Q4H PRN Holland Tavarez MD   10 mg at 07/30/24 0328    tacrolimus (PROGRAF) capsule 4 mg  4 mg Oral BID Sen Martinez MD   4 mg at 07/30/24 0842        Labs/Data:    Lab Results   Component Value Date    WBC 12.3 (H) 07/30/2024    HGB 12.0 07/30/2024    HCT 37.2 07/30/2024    MCV 89.0 07/30/2024     07/30/2024       Lab Results   Component Value Date/Time      07/30/2024 03:18 AM    K HEMOLYZED,RECOLLECT REQUESTED 07/30/2024 03:18 AM     07/30/2024 03:18 AM    CO2 24 07/30/2024 03:18 AM    BUN 43 07/30/2024 03:18 AM    CREATININE 2.33 07/30/2024 03:18 AM    GLUCOSE 113 07/30/2024 03:18 AM    GLUCOSE 101 05/31/2023 03:28 PM    CALCIUM 10.2 07/30/2024 03:18 AM    LABGLOM 27 07/30/2024 03:18 AM    LABGLOM >60 09/22/2023 08:55 PM    LABGLOM 73 05/31/2023 03:28 PM        Wt Readings from Last 3 Encounters:   07/29/24 125.2 kg (276 lb 0.3 oz)   07/11/24 122.5 kg (270 lb)   12/10/23 115.9 kg (255 lb 8.2 oz)         Intake/Output Summary (Last 24 hours) at 7/30/2024 1516  Last data filed at 7/30/2024 1316  Gross per 24 hour   Intake --   Output 1600 ml   Net -1600 ml         Patient seen and examined. Chart reviewed. Labs, data and other pertinent notes reviewed in last 24 hrs.    PMH/SH/FH reviewed and unchanged compared to H&P    Discussed with patient       Usama Martinez MD  [facility] ([facility])

## 2024-07-30 NOTE — PROGRESS NOTES
[facility]                                                                                          Hospitalist Progress Note  Holland Tavarez MD  Office Phone: [phone]        Date of Service:  2024  NAME:  Bharath Calderón  :  1985  MRN:  [id number]       Admission Summary:     Bharath Calderón is a 39 y.o. female with a pmhx ESRD 2/2 lupus nephritis s/p transplant, asthma, GERD, anemia, hypertension, and past VTE who presented to [facility] with increased urinary frequency and pelvic pain, and was admitted for urosepsis, and Linnette.  Associated sx include myalgia, fever, and right flank pain.      In the ED, she was febrile to 103.2, and tachycardic to 115.  Other VSS.  Labs showed WBC 12.3, BUN 23, and creatinine 2.17.   UA showed moderated leukocyte esterase, 20-50 WBC, and 1+ bacteria, Urine culture ordered, and pending.     In the ED, she received IV morphine, ceftriaxone, tylenol, and 1L normal saline bolus           Interval history / Subjective:   Follow-up for UTI sepsis.  -Overnight events noted.  Patient with tachypneic and was put back on oxygen.  She is currently on oxygen 2 L/min satting 100%.  She has diffuse wheezing.  Ordered CXR, DuoNeb and will give a dose of Lasix     Assessment & Plan:     Sepsis due to urinary tract infection, sepsis evidenced by fever Tmax of 103.2, tachycardia, leukocytosis.  CT without hydronephrosis of the right transplanted kidney.  Urine culture grew E. coli  -- SIRS resolved  -- Continue IV ceftriaxone  -- Blood cultures negative  --ID following      Acute hypoxic respiratory failure, wheezing, dyspneic and hypoxic   Fluid overload/acute pulm edema  -- Resolved with IV diuretics  -- , shortness of breath and wheezing.  Lasix 20 mg IV x 1, bronchodilators.  CXR           #ESRD due to lupus nephritis, she has been on hemodialysis for 7 years and is now status post kidney transplant  #LINNETTE, prerenal versus        Recent Labs     07/29/24  0648 07/30/24  0318   ALT 36 43   GLOB 4.3* 6.0*       No results for input(s): \"INR\", \"APTT\" in the last 72 hours.    Invalid input(s): \"PTP\"   No results for input(s): \"TIBC\" in the last 72 hours.    Invalid input(s): \"FE\", \"PSAT\", \"FERR\"   No results found for: \"RBCF\"   No results for input(s): \"PH\", \"PCO2\", \"PO2\" in the last 72 hours.  No results for input(s): \"CPK\" in the last 72 hours.    Invalid input(s): \"CPKMB\", \"CKNDX\", \"TROIQ\"  No results found for: \"CHOL\", \"CHLST\", \"CHOLV\", \"HDL\", \"HDLC\", \"LDL\"  Lab Results   Component Value Date/Time    GLUCPOC 85 10/18/2019 11:28 AM     [unfilled]    Notes reviewed from all clinical/nonclinical/nursing services involved in patient's clinical care. Care coordination discussions were held with appropriate clinical/nonclinical/ nursing providers based on care coordination needs.         Patients current active Medications were reviewed, considered, added and adjusted based on the clinical condition today.      Home Medications were reconciled to the best of my ability given all available resources at the time of admission. Route is PO if not otherwise noted.      Admission Status:[id number]:::1}      Medications Reviewed:     Current Facility-Administered Medications   Medication Dose Route Frequency    furosemide (LASIX) injection 20 mg  20 mg IntraVENous Once    ipratropium 0.5 mg-albuterol 2.5 mg (DUONEB) nebulizer solution 1 Dose  1 Dose Inhalation Q4H PRN    cefTRIAXone (ROCEPHIN) 2,000 mg in sterile water 20 mL IV syringe  2,000 mg IntraVENous Q24H    magnesium hydroxide (MILK OF MAGNESIA) 400 MG/5ML suspension 30 mL  30 mL Oral Daily PRN    sodium chloride flush 0.9 % injection 5-40 mL  5-40 mL IntraVENous 2 times per day    sodium chloride flush 0.9 % injection 5-40 mL  5-40 mL IntraVENous PRN    0.9 % sodium chloride infusion   IntraVENous PRN    acetaminophen (TYLENOL) tablet 650 mg  650 mg Oral Q6H PRN    Or    acetaminophen (TYLENOL)

## 2024-07-30 NOTE — PROGRESS NOTES
7/30/24 0328 pt c/o to lower abd. IV flushed and observed leaking and infiltrated. Writer attempted IV once and got another nurse who attempted x2 and unsuccessful. Pt limb alert to R arm due to fistula. Rapid nurse (Avery) stated she has a good L Brachial vein however Day shift would need to reach out to Nephrology for approval to access that site, then get In touch with Vascular for central line or extended dewell. Pt given PO med for pain management.     0835 Shift Report given in SBAR format, and oncoming nurse (Ollie, ROSA MARIA) made aware of pt condition and issue with IV access and recommendation

## 2024-07-31 LAB
ALBUMIN SERPL-MCNC: 2.5 G/DL (ref 3.5–5)
ALBUMIN/GLOB SERPL: 0.4 (ref 1.1–2.2)
ALP SERPL-CCNC: 229 U/L (ref 45–117)
ALT SERPL-CCNC: 69 U/L (ref 12–78)
ANION GAP SERPL CALC-SCNC: 9 MMOL/L (ref 5–15)
AST SERPL-CCNC: 137 U/L (ref 15–37)
BACTERIA SPEC CULT: NORMAL
BACTERIA SPEC CULT: NORMAL
BILIRUB SERPL-MCNC: 0.5 MG/DL (ref 0.2–1)
BUN SERPL-MCNC: 45 MG/DL (ref 6–20)
BUN/CREAT SERPL: 21 (ref 12–20)
CALCIUM SERPL-MCNC: 10.3 MG/DL (ref 8.5–10.1)
CHLORIDE SERPL-SCNC: 101 MMOL/L (ref 97–108)
CO2 SERPL-SCNC: 25 MMOL/L (ref 21–32)
CREAT SERPL-MCNC: 2.12 MG/DL (ref 0.55–1.02)
ERYTHROCYTE [DISTWIDTH] IN BLOOD BY AUTOMATED COUNT: 13.4 % (ref 11.5–14.5)
GLOBULIN SER CALC-MCNC: 5.8 G/DL (ref 2–4)
GLUCOSE SERPL-MCNC: 124 MG/DL (ref 65–100)
HCT VFR BLD AUTO: 35.6 % (ref 35–47)
HGB BLD-MCNC: 11.7 G/DL (ref 11.5–16)
MAGNESIUM SERPL-MCNC: 2.1 MG/DL (ref 1.6–2.4)
MCH RBC QN AUTO: 28.5 PG (ref 26–34)
MCHC RBC AUTO-ENTMCNC: 32.9 G/DL (ref 30–36.5)
MCV RBC AUTO: 86.6 FL (ref 80–99)
NRBC # BLD: 0 K/UL (ref 0–0.01)
NRBC BLD-RTO: 0 PER 100 WBC
PHOSPHATE SERPL-MCNC: 2.5 MG/DL (ref 2.6–4.7)
PLATELET # BLD AUTO: 298 K/UL (ref 150–400)
PMV BLD AUTO: 10.4 FL (ref 8.9–12.9)
POTASSIUM SERPL-SCNC: 4 MMOL/L (ref 3.5–5.1)
PROT SERPL-MCNC: 8.3 G/DL (ref 6.4–8.2)
RBC # BLD AUTO: 4.11 M/UL (ref 3.8–5.2)
SERVICE CMNT-IMP: NORMAL
SERVICE CMNT-IMP: NORMAL
SODIUM SERPL-SCNC: 135 MMOL/L (ref 136–145)
WBC # BLD AUTO: 12.4 K/UL (ref 3.6–11)

## 2024-07-31 PROCEDURE — 83735 ASSAY OF MAGNESIUM: CPT

## 2024-07-31 PROCEDURE — 84100 ASSAY OF PHOSPHORUS: CPT

## 2024-07-31 PROCEDURE — 80053 COMPREHEN METABOLIC PANEL: CPT

## 2024-07-31 PROCEDURE — 6360000002 HC RX W HCPCS: Performed by: FAMILY MEDICINE

## 2024-07-31 PROCEDURE — 2580000003 HC RX 258: Performed by: FAMILY MEDICINE

## 2024-07-31 PROCEDURE — 2580000003 HC RX 258

## 2024-07-31 PROCEDURE — 94760 N-INVAS EAR/PLS OXIMETRY 1: CPT

## 2024-07-31 PROCEDURE — 2700000000 HC OXYGEN THERAPY PER DAY

## 2024-07-31 PROCEDURE — 6370000000 HC RX 637 (ALT 250 FOR IP): Performed by: FAMILY MEDICINE

## 2024-07-31 PROCEDURE — 6360000002 HC RX W HCPCS

## 2024-07-31 PROCEDURE — 6360000002 HC RX W HCPCS: Performed by: INTERNAL MEDICINE

## 2024-07-31 PROCEDURE — 6360000002 HC RX W HCPCS: Performed by: HOSPITALIST

## 2024-07-31 PROCEDURE — 36415 COLL VENOUS BLD VENIPUNCTURE: CPT

## 2024-07-31 PROCEDURE — 2060000000 HC ICU INTERMEDIATE R&B

## 2024-07-31 PROCEDURE — 99232 SBSQ HOSP IP/OBS MODERATE 35: CPT | Performed by: INTERNAL MEDICINE

## 2024-07-31 PROCEDURE — 6370000000 HC RX 637 (ALT 250 FOR IP): Performed by: HOSPITALIST

## 2024-07-31 PROCEDURE — 85027 COMPLETE CBC AUTOMATED: CPT

## 2024-07-31 RX ORDER — FUROSEMIDE 10 MG/ML
20 INJECTION INTRAMUSCULAR; INTRAVENOUS ONCE
Status: DISCONTINUED | OUTPATIENT
Start: 2024-07-31 | End: 2024-07-31

## 2024-07-31 RX ADMIN — HEPARIN SODIUM 5000 UNITS: 5000 INJECTION INTRAVENOUS; SUBCUTANEOUS at 22:10

## 2024-07-31 RX ADMIN — TACROLIMUS 4 MG: 1 CAPSULE ORAL at 22:10

## 2024-07-31 RX ADMIN — TACROLIMUS 4 MG: 1 CAPSULE ORAL at 08:20

## 2024-07-31 RX ADMIN — PREDNISONE 5 MG: 5 TABLET ORAL at 08:20

## 2024-07-31 RX ADMIN — SODIUM CHLORIDE, PRESERVATIVE FREE 10 ML: 5 INJECTION INTRAVENOUS at 22:10

## 2024-07-31 RX ADMIN — PANTOPRAZOLE SODIUM 40 MG: 40 TABLET, DELAYED RELEASE ORAL at 06:52

## 2024-07-31 RX ADMIN — WATER 2000 MG: 1 INJECTION INTRAMUSCULAR; INTRAVENOUS; SUBCUTANEOUS at 11:44

## 2024-07-31 RX ADMIN — HEPARIN SODIUM 5000 UNITS: 5000 INJECTION INTRAVENOUS; SUBCUTANEOUS at 16:02

## 2024-07-31 RX ADMIN — HEPARIN SODIUM 5000 UNITS: 5000 INJECTION INTRAVENOUS; SUBCUTANEOUS at 06:52

## 2024-07-31 RX ADMIN — MORPHINE SULFATE 4 MG: 4 INJECTION, SOLUTION INTRAMUSCULAR; INTRAVENOUS at 22:18

## 2024-07-31 RX ADMIN — SODIUM CHLORIDE, PRESERVATIVE FREE 10 ML: 5 INJECTION INTRAVENOUS at 08:20

## 2024-07-31 RX ADMIN — OXYCODONE 10 MG: 5 TABLET ORAL at 00:29

## 2024-07-31 ASSESSMENT — PAIN DESCRIPTION - ORIENTATION
ORIENTATION: RIGHT
ORIENTATION: RIGHT

## 2024-07-31 ASSESSMENT — PAIN SCALES - GENERAL
PAINLEVEL_OUTOF10: 7
PAINLEVEL_OUTOF10: 6

## 2024-07-31 ASSESSMENT — PAIN DESCRIPTION - LOCATION
LOCATION: PELVIS
LOCATION: ABDOMEN

## 2024-07-31 NOTE — PROGRESS NOTES
Name: Bharath Calderón   MRN: [id number]  : 1985    Nephrology Progress Note    Assessment:  LINNETTE- likely pre-renal vs early ATN in the setting of Urosepsis/Acute pyelonephritis. CT A/P with no hydronephrosis of transplant kidney. UA with pyuria and protein of 100 on dipstick. Denies missing any IS meds.      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 unit. Still has R AVG in place.   HTN>>Hypotension/tachycardia  Hypercalcemia    Serum Cr showing improvement now to 2.1mg/dl today. Serum Na better at 135. Ca slowly rising to 10.3 (uncorrected)    Plan/Recommendations:  Try to hold off on further diuresis if possible  I think she may have some underlying RIZWAN-> was scheduled for sleep study PTA  IV Rocephin  Prograf 4 mg BID-> level stable  HOLD MYFORTIC  (temporarily) due to active infection/sepsis-resume once infection resolves at time of d/c  Ct Prednisone 5 mg QD  Daily labs      Subjective:  Reports feeling much better. O2 seems to be dropping intermittently-> patient says its happening mostly at night while she is sleeping.     ROS:   No nausea, no vomiting  No chest pain, No shortness of breath    Exam:  BP (!) 155/120   Pulse (!) 112   Temp 98.4 °F (36.9 °C) (Oral)   Resp 21   Ht 1.651 m (5' 5\")   Wt 124.8 kg (275 lb 1.6 oz)   SpO2 91%   BMI 45.78 kg/m²     Gen: NAD/Obese  HEENT:  AT/NC  Lungs/Chest wall: Clear. Equal BS. No respiratory distress  Cardiovascular: Regular rate, normal rhythm.   Abdomen/: Soft, No TTP over RLQ transplant kidney  Ext: No peripheral edema  CNS: alert and awake.     Current Facility-Administered Medications    Medication Dose Route Frequency Provider Last Rate Last Admin    furosemide (LASIX) injection 20 mg  20 mg IntraVENous Once Holland Tavarez MD        ipratropium 0.5 mg-albuterol 2.5 mg (DUONEB) nebulizer solution 1 Dose  1 Dose Inhalation Q4H PRN Holland Tavarez MD   1 Dose at 07/30/24 0946    cefTRIAXone (ROCEPHIN) 2,000 mg in sterile water 20 mL IV syringe  2,000 mg IntraVENous Q24H Elizabet Kinney APRN - NP   2,000 mg at 07/31/24 1144    magnesium hydroxide (MILK OF MAGNESIA) 400 MG/5ML suspension 30 mL  30 mL Oral Daily PRN Holland Tavarez MD   30 mL at 07/27/24 1541    sodium chloride flush 0.9 % injection 5-40 mL  5-40 mL IntraVENous 2 times per day Margaret Harris MD   10 mL at 07/31/24 0820    sodium chloride flush 0.9 % injection 5-40 mL  5-40 mL IntraVENous PRN Margaret Harris MD        0.9 % sodium chloride infusion   IntraVENous PRN Margaret Harris MD 5 mL/hr at 07/27/24 0825 New Bag at 07/27/24 0825    acetaminophen (TYLENOL) tablet 650 mg  650 mg Oral Q6H PRN Margaret Harris MD   650 mg at 07/27/24 2356    Or    acetaminophen (TYLENOL) suppository 650 mg  650 mg Rectal Q6H PRN Margaret Harris MD        morphine (PF) injection 4 mg  4 mg IntraVENous Q4H PRN Margaret Harris MD   4 mg at 07/30/24 2122    naloxone (NARCAN) injection 0.4 mg  0.4 mg IntraVENous PRN Margaret Harris MD        [Held by provider] carvedilol (COREG) tablet 25 mg  25 mg Oral BID  Margaret Harris MD        predniSONE (DELTASONE) tablet 5 mg  5 mg Oral Daily Margaret Harris MD   5 mg at 07/31/24 0820    pantoprazole (PROTONIX) tablet 40 mg  40 mg Oral QAM AC Margaret Harris MD   40 mg at 07/31/24 0652    heparin (porcine) injection 5,000 Units  5,000 Units SubCUTAneous 3 times per day Margaret Harris MD   5,000 Units at 07/31/24 0652    oxyCODONE (ROXICODONE) immediate release tablet 10 mg  10 mg Oral Q4H PRN Holland Tavarez MD   10 mg at 07/31/24 0029    tacrolimus (PROGRAF) capsule 4 mg  4 mg Oral BID

## 2024-07-31 NOTE — PROGRESS NOTES
Infectious Diseases Follow Up    7/31/2024    INFECTIOUS DISEASE Attending:     I agree with the above infectious disease daily progress note in its entirety as authored by and discussed in detail with the nurse practitioner.   I have reviewed pertinent laboratory studies, microbiology cultures, radiologic reports with review of the consultations and progress notes as appropriate.   I agree with today's subjective findings, physical examination, assessment and plan of care as described above and discussed extensively with the nurse practitioner.       Continue Ceftriaxone while inpatient until 8/7/24, inclusive.    If discharged prior to 8/7/24, then complete course as outpatient with Levaquin 500mg PO q48hrs - dose may need to be adjusted per renal function pending renal recovery.    Immunosuppression reduction, defer to nephrology.    Will sign off, please call with questions.    Assessment & Plan:     39 y.o. 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 Infectious disease services was consulted to assist with antibiotic management of sepsis, UTI in patient with right kidney transplant.      Complicated E. coli UTI/pyelonephritis in immunocompromised patient  Leukocytosis  Fever-resolved  Hx right kidney transplant, on mycophenolate, Prograf, prednisone     Follow blood cultures.  Unfortunately collected after first dose of ceftriaxone was administered and therefore will likely remain negative.     Appreciate nephrology input and for holding Cellcept with active infection     Continue ceftriaxone IV 2 g while inpatient.      WBC stable on prednisone, afebrile. Can transition to Levaquin 500 mg Q 48 hrs through 8/7/24, inclusive, when stable for discharge.      ESRD secondary to lupus nephritis  Nephrology following, was on HD    ID team signing off. Please reach out with any questions.       5-40 mL, IntraVENous, 2 times per day, Margaret Harris MD, 10 mL at 07/31/24 0820    sodium chloride flush 0.9 % injection 5-40 mL, 5-40 mL, IntraVENous, PRN, Margaret Harris MD    0.9 % sodium chloride infusion, , IntraVENous, PRN, Margaret Harris MD, Last Rate: 5 mL/hr at 07/27/24 0825, New Bag at 07/27/24 0825    acetaminophen (TYLENOL) tablet 650 mg, 650 mg, Oral, Q6H PRN, 650 mg at 07/27/24 2356 **OR** acetaminophen (TYLENOL) suppository 650 mg, 650 mg, Rectal, Q6H PRN, Margaret Harris MD    morphine (PF) injection 4 mg, 4 mg, IntraVENous, Q4H PRN, Margaret Harris MD, 4 mg at 07/30/24 2122    naloxone (NARCAN) injection 0.4 mg, 0.4 mg, IntraVENous, PRN, Margaret Harris MD    [Held by provider] carvedilol (COREG) tablet 25 mg, 25 mg, Oral, BID WC, Margaret Harris MD    predniSONE (DELTASONE) tablet 5 mg, 5 mg, Oral, Daily, Margaret Harris MD, 5 mg at 07/31/24 0820    pantoprazole (PROTONIX) tablet 40 mg, 40 mg, Oral, QAM AC, Margaret Harris MD, 40 mg at 07/31/24 0652    heparin (porcine) injection 5,000 Units, 5,000 Units, SubCUTAneous, 3 times per day, Margaret Harris MD, 5,000 Units at 07/31/24 0652    oxyCODONE (ROXICODONE) immediate release tablet 10 mg, 10 mg, Oral, Q4H PRN, Holland Tavarez MD, 10 mg at 07/31/24 0029    tacrolimus (PROGRAF) capsule 4 mg, 4 mg, Oral, BID, Sen Martinez MD, 4 mg at 07/31/24 0820      Antibiotics:    ceftriaxone      Case discussed with:    Patient, Dr. Plata, Dr. Juan Kinney, APRN - NP    A total time of 35 minutes was spent on today's encounter.  Greater than 50% of the time was spent on the following:  Preparing for visit and chart review.  Obtaining and/or reviewing separately obtained history  Performing a medically appropriate exam and/or evaluation  Counseling and educating a patient/family/caregiver as noted above  Referring and communicating with other professionals (not separately reported)  Independently interpreting results (not

## 2024-07-31 NOTE — PROGRESS NOTES
Bedside shift change report given to ROSA MARIA Shields (oncoming nurse) by ROSA MARIA Tinsley (offgoing nurse). Report included the following information Nurse Handoff Report, Adult Overview, Intake/Output, MAR, Recent Results, Cardiac Rhythm NSR-ST, Quality Measures, and Neuro Assessment.

## 2024-07-31 NOTE — PROGRESS NOTES
[facility]                                                                                          Hospitalist Progress Note  Holland Tavarez MD  Office Phone: [phone]        Date of Service:  2024  NAME:  Bharath Calderón  :  1985  MRN:  [id number]       Admission Summary:     Bharath Calderón is a 39 y.o. female with a pmhx ESRD 2/2 lupus nephritis s/p transplant, asthma, GERD, anemia, hypertension, and past VTE who presented to [facility] with increased urinary frequency and pelvic pain, and was admitted for urosepsis, and Jean.  Associated sx include myalgia, fever, and right flank pain.      In the ED, she was febrile to 103.2, and tachycardic to 115.  Other VSS.  Labs showed WBC 12.3, BUN 23, and creatinine 2.17.   UA showed moderated leukocyte esterase, 20-50 WBC, and 1+ bacteria, Urine culture ordered, and pending.     In the ED, she received IV morphine, ceftriaxone, tylenol, and 1L normal saline bolus           Interval history / Subjective:   Follow-up for UTI sepsis.  - I saw her in the morning hours, she is feeling better, satting 100% on 2 L and took her off oxygen, her sats remain poor 90s, RN notified to monitor.  -- Discussed with nephrology, holding Lasix since she is showing evidence of contraction     Assessment & Plan:     Sepsis due to urinary tract infection, sepsis evidenced by fever Tmax of 103.2, tachycardia, leukocytosis.  CT without hydronephrosis of the right transplanted kidney.  Urine culture grew E. coli  -- SIRS resolved  -- Continue IV ceftriaxone  -- Blood cultures negative  --ID following      Acute hypoxic respiratory failure, wheezing, dyspneic and hypoxic   Fluid overload/acute pulm edema  -- Resolved with IV diuretics  -- , shortness of breath and wheezing.  Status post Lasix 20 mg IV x 1, bronchodilators.             #ESRD due to lupus nephritis, she has been on hemodialysis for 7 years and is now status

## 2024-08-01 LAB
ALBUMIN SERPL-MCNC: 2.6 G/DL (ref 3.5–5)
ALBUMIN/GLOB SERPL: 0.5 (ref 1.1–2.2)
ALP SERPL-CCNC: 165 U/L (ref 45–117)
ALT SERPL-CCNC: 46 U/L (ref 12–78)
ANION GAP SERPL CALC-SCNC: 7 MMOL/L (ref 5–15)
AST SERPL-CCNC: 39 U/L (ref 15–37)
BILIRUB SERPL-MCNC: 0.3 MG/DL (ref 0.2–1)
BUN SERPL-MCNC: 37 MG/DL (ref 6–20)
BUN/CREAT SERPL: 22 (ref 12–20)
CALCIUM SERPL-MCNC: 10.4 MG/DL (ref 8.5–10.1)
CHLORIDE SERPL-SCNC: 104 MMOL/L (ref 97–108)
CO2 SERPL-SCNC: 26 MMOL/L (ref 21–32)
CREAT SERPL-MCNC: 1.7 MG/DL (ref 0.55–1.02)
GLOBULIN SER CALC-MCNC: 5.7 G/DL (ref 2–4)
GLUCOSE SERPL-MCNC: 162 MG/DL (ref 65–100)
POTASSIUM SERPL-SCNC: 3.9 MMOL/L (ref 3.5–5.1)
PROT SERPL-MCNC: 8.3 G/DL (ref 6.4–8.2)
SODIUM SERPL-SCNC: 137 MMOL/L (ref 136–145)

## 2024-08-01 PROCEDURE — 6360000002 HC RX W HCPCS: Performed by: FAMILY MEDICINE

## 2024-08-01 PROCEDURE — 6370000000 HC RX 637 (ALT 250 FOR IP): Performed by: FAMILY MEDICINE

## 2024-08-01 PROCEDURE — 36415 COLL VENOUS BLD VENIPUNCTURE: CPT

## 2024-08-01 PROCEDURE — 2580000003 HC RX 258

## 2024-08-01 PROCEDURE — 2060000000 HC ICU INTERMEDIATE R&B

## 2024-08-01 PROCEDURE — 80053 COMPREHEN METABOLIC PANEL: CPT

## 2024-08-01 PROCEDURE — 6360000002 HC RX W HCPCS: Performed by: INTERNAL MEDICINE

## 2024-08-01 PROCEDURE — 6360000002 HC RX W HCPCS

## 2024-08-01 PROCEDURE — 6370000000 HC RX 637 (ALT 250 FOR IP): Performed by: HOSPITALIST

## 2024-08-01 PROCEDURE — 2580000003 HC RX 258: Performed by: FAMILY MEDICINE

## 2024-08-01 RX ORDER — CARVEDILOL 6.25 MG/1
6.25 TABLET ORAL 2 TIMES DAILY WITH MEALS
Status: DISCONTINUED | OUTPATIENT
Start: 2024-08-01 | End: 2024-08-02 | Stop reason: HOSPADM

## 2024-08-01 RX ADMIN — HEPARIN SODIUM 5000 UNITS: 5000 INJECTION INTRAVENOUS; SUBCUTANEOUS at 14:27

## 2024-08-01 RX ADMIN — CARVEDILOL 6.25 MG: 6.25 TABLET, FILM COATED ORAL at 17:55

## 2024-08-01 RX ADMIN — SODIUM CHLORIDE, PRESERVATIVE FREE 10 ML: 5 INJECTION INTRAVENOUS at 11:04

## 2024-08-01 RX ADMIN — HEPARIN SODIUM 5000 UNITS: 5000 INJECTION INTRAVENOUS; SUBCUTANEOUS at 07:31

## 2024-08-01 RX ADMIN — TACROLIMUS 4 MG: 1 CAPSULE ORAL at 21:53

## 2024-08-01 RX ADMIN — SODIUM CHLORIDE, PRESERVATIVE FREE 10 ML: 5 INJECTION INTRAVENOUS at 21:53

## 2024-08-01 RX ADMIN — TACROLIMUS 4 MG: 1 CAPSULE ORAL at 11:00

## 2024-08-01 RX ADMIN — MORPHINE SULFATE 4 MG: 4 INJECTION, SOLUTION INTRAMUSCULAR; INTRAVENOUS at 11:10

## 2024-08-01 RX ADMIN — PANTOPRAZOLE SODIUM 40 MG: 40 TABLET, DELAYED RELEASE ORAL at 11:00

## 2024-08-01 RX ADMIN — MORPHINE SULFATE 4 MG: 4 INJECTION, SOLUTION INTRAMUSCULAR; INTRAVENOUS at 19:50

## 2024-08-01 RX ADMIN — PREDNISONE 5 MG: 5 TABLET ORAL at 11:00

## 2024-08-01 RX ADMIN — MORPHINE SULFATE 4 MG: 4 INJECTION, SOLUTION INTRAMUSCULAR; INTRAVENOUS at 04:12

## 2024-08-01 RX ADMIN — WATER 2000 MG: 1 INJECTION INTRAMUSCULAR; INTRAVENOUS; SUBCUTANEOUS at 14:27

## 2024-08-01 RX ADMIN — HEPARIN SODIUM 5000 UNITS: 5000 INJECTION INTRAVENOUS; SUBCUTANEOUS at 21:53

## 2024-08-01 RX ADMIN — CARVEDILOL 6.25 MG: 6.25 TABLET, FILM COATED ORAL at 11:06

## 2024-08-01 ASSESSMENT — PAIN DESCRIPTION - DESCRIPTORS
DESCRIPTORS: CRAMPING;ACHING
DESCRIPTORS: ACHING

## 2024-08-01 ASSESSMENT — PAIN DESCRIPTION - LOCATION
LOCATION: BACK
LOCATION: ABDOMEN

## 2024-08-01 ASSESSMENT — PAIN - FUNCTIONAL ASSESSMENT
PAIN_FUNCTIONAL_ASSESSMENT: ACTIVITIES ARE NOT PREVENTED
PAIN_FUNCTIONAL_ASSESSMENT: ACTIVITIES ARE NOT PREVENTED

## 2024-08-01 ASSESSMENT — PAIN SCALES - GENERAL
PAINLEVEL_OUTOF10: 8
PAINLEVEL_OUTOF10: 4
PAINLEVEL_OUTOF10: 7
PAINLEVEL_OUTOF10: 7

## 2024-08-01 ASSESSMENT — PAIN DESCRIPTION - ORIENTATION
ORIENTATION: LOWER
ORIENTATION: LOWER
ORIENTATION: LEFT;LOWER

## 2024-08-01 NOTE — PROGRESS NOTES
Name: Bharath Calderón   MRN: [id number]  : 1985    Nephrology Progress Note    Assessment:  LINNETTE- likely pre-renal vs early ATN in the setting of Urosepsis/Acute pyelonephritis. CT A/P with no hydronephrosis of transplant kidney. UA with pyuria and protein of 100 on dipstick. Denies missing any IS meds.      S/P CKT (, [facility], RLQ)- basln Cr normal as off  based on review of records     Fever/Urosepsis/Leucocytosis: E.Coli acute pyelonephritis  Hyponatremia-mild  Metabolic acidosis: mild NAGMA  Hx of ESRD-2nd to Lupus nephritis-was on HD at [facility] unit. Still has R AVG in place.   HTN>>Hypotension/tachycardia  Hypercalcemia    Serum Cr showing improvement now to 2.1mg/dl today. Serum Na better at 135. Ca slowly rising to 10.3 (uncorrected)    Plan/Recommendations:  Repeat lab work  IV Rocephin  Prograf 4 mg BID->  last level from  stable  HOLD MYFORTIC  (temporarily) due to active infection/sepsis-resume once infection resolves at time of d/c  Ct Prednisone 5 mg QD  Daily labs      Subjective:  Reports feeling much better. Off O2  Improving appetite    ROS:   No nausea, no vomiting  No chest pain, No shortness of breath    Exam:  BP (!) 137/112   Pulse (!) 104   Temp 98 °F (36.7 °C) (Oral)   Resp 19   Ht 1.651 m (5' 5\")   Wt 122.2 kg (269 lb 6.4 oz)   SpO2 93%   BMI 44.83 kg/m²     Gen: NAD/Obese  HEENT:  AT/NC  Lungs/Chest wall: Clear. Equal BS. No respiratory distress  Cardiovascular: Regular rate, normal rhythm.   Abdomen/: Soft, No TTP over RLQ transplant kidney  Ext: No peripheral edema  CNS: alert and awake.     Current Facility-Administered Medications   Medication Dose Route Frequency Provider Last Rate Last Admin    carvedilol (COREG) tablet 6.25 mg  6.25 mg Oral BID  Dilma Edward MD   6.25 mg at 24 1106    ipratropium 0.5  135 07/31/2024 12:49 AM    K 4.0 07/31/2024 12:49 AM     07/31/2024 12:49 AM    CO2 25 07/31/2024 12:49 AM    BUN 45 07/31/2024 12:49 AM    CREATININE 2.12 07/31/2024 12:49 AM    GLUCOSE 124 07/31/2024 12:49 AM    GLUCOSE 101 05/31/2023 03:28 PM    CALCIUM 10.3 07/31/2024 12:49 AM    LABGLOM 30 07/31/2024 12:49 AM    LABGLOM >60 09/22/2023 08:55 PM    LABGLOM 73 05/31/2023 03:28 PM        Wt Readings from Last 3 Encounters:   08/01/24 122.2 kg (269 lb 6.4 oz)   07/11/24 122.5 kg (270 lb)   12/10/23 115.9 kg (255 lb 8.2 oz)       No intake or output data in the 24 hours ending 08/01/24 1439      Patient seen and examined. Chart reviewed. Labs, data and other pertinent notes reviewed in last 24 hrs.    PMH/SH/FH reviewed and unchanged compared to H&P    Discussed with patient and RN      Usama Martinez MD  [facility] ([facility])

## 2024-08-01 NOTE — PROGRESS NOTES
[facility]                                                                                          Hospitalist Progress Note  Dilma Edward MD  Office Phone: [phone]        Date of Service:  2024  NAME:  Bharath Calderón  :  1985  MRN:  [id number]       Admission Summary:     Bharath Calderón is a 39 y.o. 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  Associated sx include myalgia, fever, and right flank pain.      In the ED, she was febrile to 103.2, and tachycardic to 115.  Other VSS.  Labs showed WBC 12.3, BUN 23, and creatinine 2.17.   UA showed moderated leukocyte esterase, 20-50 WBC, and 1+ bacteria, Urine culture ordered, and pending.     In the ED, she received IV morphine, ceftriaxone, tylenol, and 1L normal saline bolus           Interval history / Subjective:   Follow-up for UTI sepsis.  - I saw her in the morning hours, she is feeling better, satting 100% on 2 L and took her off oxygen, her sats remain poor 90s, RN notified to monitor.  -- Discussed with nephrology, holding Lasix since she is showing evidence of contraction     Assessment & Plan:     Sepsis due to urinary tract infection, sepsis evidenced by fever Tmax of 103.2, tachycardia, leukocytosis.  CT without hydronephrosis of the right transplanted kidney.  Urine culture grew E. coli  -- SIRS resolved  -- Continue IV ceftriaxone  -- Blood cultures negative  --ID following :Can transition to Levaquin 500 mg Q 48 hrs through 24       Acute hypoxic respiratory failure, wheezing, dyspneic and hypoxic   Fluid overload/acute pulm edema  -- Resolved with IV diuretics  -- , shortness of breath and wheezing.  Status post Lasix 20 mg IV x 1, bronchodilators.    -resolved            #ESRD due to lupus  tablet 650 mg  650 mg Oral Q6H PRN    Or    acetaminophen (TYLENOL) suppository 650 mg  650 mg Rectal Q6H PRN    morphine (PF) injection 4 mg  4 mg IntraVENous Q4H PRN    naloxone (NARCAN) injection 0.4 mg  0.4 mg IntraVENous PRN    predniSONE (DELTASONE) tablet 5 mg  5 mg Oral Daily    pantoprazole (PROTONIX) tablet 40 mg  40 mg Oral QAM AC    heparin (porcine) injection 5,000 Units  5,000 Units SubCUTAneous 3 times per day    oxyCODONE (ROXICODONE) immediate release tablet 10 mg  10 mg Oral Q4H PRN    tacrolimus (PROGRAF) capsule 4 mg  4 mg Oral BID     ______________________________________________________________________  EXPECTED LENGTH OF STAY: 9  ACTUAL LENGTH OF STAY:          7                 Dilma Edward MD

## 2024-08-01 NOTE — PROGRESS NOTES
Comprehensive Nutrition Assessment    Type and Reason for Visit: Initial, RD Nutrition Re-Screen/LOS    Nutrition Recommendations/Plan:   Continue current diet  Consider ONS if intake does not improve  Consider bowel regimen if no BM   Monitor Phos (2.5 on 7/31)     Malnutrition Assessment:  Malnutrition Status:  Mild malnutrition (08/01/24 1111)    Context:  Acute Illness     Findings of the 6 clinical characteristics of malnutrition:  Energy Intake:  50% or less of estimated energy requirements for 5 or more days  Weight Loss:  No significant weight loss     Body Fat Loss:  No significant body fat loss     Muscle Mass Loss:  No significant muscle mass loss    Fluid Accumulation:  No significant fluid accumulation     Strength:  Not Performed     Nutrition Assessment:    38 yo female admitted for Sepsis from UTI. PMH includes ESRD 2' lupus nephritis s/p transplant, asthma, GERD, anemia, and HTN. NKFA. RD triggered for LOS.     8/1: Spoke with pt at bedside, she reports her appetite improving and eating 50-75% of her meals. She has no N/V/D. She has not had a BM since admission but states she thinks it will happen soon and naturally now that she is eating. PTA her intake was ~50% of her normal intake d/t decreased appetite. Will continue to monitor for PO intake.     Nutritionally Significant Medications:  Rocephin, protonix, Prednisone, morphine prn    Estimated Daily Nutrient Needs:  Energy Requirements Based On: Formula  Weight Used for Energy Requirements: Admission  Energy (kcal/day): 2526 kcal (MSJ)  Weight Used for Protein Requirements: Admission  Protein (g/day): 126 (1.0 g/kg)  Method Used for Fluid Requirements: 1 ml/kcal  Fluid (ml/day): 2500 ml    Nutrition Related Findings:   Edema: Right upper extremity, Left upper extremity, Right lower extremity, Left lower extremity      RUE Edema: Trace  LUE Edema: Trace  RLE Edema: +1, Non-pitting  LLE Edema: +1, Non-pitting    Recent Labs     07/30/24  7368

## 2024-08-02 VITALS
BODY MASS INDEX: 44.89 KG/M2 | OXYGEN SATURATION: 94 % | WEIGHT: 269.4 LBS | TEMPERATURE: 98.6 F | SYSTOLIC BLOOD PRESSURE: 135 MMHG | HEIGHT: 65 IN | RESPIRATION RATE: 21 BRPM | HEART RATE: 96 BPM | DIASTOLIC BLOOD PRESSURE: 87 MMHG

## 2024-08-02 LAB
ALBUMIN SERPL-MCNC: 2.5 G/DL (ref 3.5–5)
ALBUMIN/GLOB SERPL: 0.5 (ref 1.1–2.2)
ALP SERPL-CCNC: 149 U/L (ref 45–117)
ALT SERPL-CCNC: 38 U/L (ref 12–78)
ANION GAP SERPL CALC-SCNC: 8 MMOL/L (ref 5–15)
AST SERPL-CCNC: 31 U/L (ref 15–37)
BILIRUB SERPL-MCNC: 0.4 MG/DL (ref 0.2–1)
BUN SERPL-MCNC: 31 MG/DL (ref 6–20)
BUN/CREAT SERPL: 21 (ref 12–20)
CALCIUM SERPL-MCNC: 10 MG/DL (ref 8.5–10.1)
CHLORIDE SERPL-SCNC: 106 MMOL/L (ref 97–108)
CO2 SERPL-SCNC: 24 MMOL/L (ref 21–32)
CREAT SERPL-MCNC: 1.48 MG/DL (ref 0.55–1.02)
ERYTHROCYTE [DISTWIDTH] IN BLOOD BY AUTOMATED COUNT: 13.6 % (ref 11.5–14.5)
GLOBULIN SER CALC-MCNC: 5.2 G/DL (ref 2–4)
GLUCOSE SERPL-MCNC: 116 MG/DL (ref 65–100)
HCT VFR BLD AUTO: 34.6 % (ref 35–47)
HGB BLD-MCNC: 11.2 G/DL (ref 11.5–16)
MAGNESIUM SERPL-MCNC: 1.8 MG/DL (ref 1.6–2.4)
MCH RBC QN AUTO: 28.7 PG (ref 26–34)
MCHC RBC AUTO-ENTMCNC: 32.4 G/DL (ref 30–36.5)
MCV RBC AUTO: 88.7 FL (ref 80–99)
NRBC # BLD: 0 K/UL (ref 0–0.01)
NRBC BLD-RTO: 0 PER 100 WBC
PHOSPHATE SERPL-MCNC: 3.1 MG/DL (ref 2.6–4.7)
PLATELET # BLD AUTO: 307 K/UL (ref 150–400)
PMV BLD AUTO: 9.8 FL (ref 8.9–12.9)
POTASSIUM SERPL-SCNC: 3.9 MMOL/L (ref 3.5–5.1)
PROT SERPL-MCNC: 7.7 G/DL (ref 6.4–8.2)
RBC # BLD AUTO: 3.9 M/UL (ref 3.8–5.2)
SODIUM SERPL-SCNC: 138 MMOL/L (ref 136–145)
WBC # BLD AUTO: 8.7 K/UL (ref 3.6–11)

## 2024-08-02 PROCEDURE — 80197 ASSAY OF TACROLIMUS: CPT

## 2024-08-02 PROCEDURE — 6370000000 HC RX 637 (ALT 250 FOR IP): Performed by: HOSPITALIST

## 2024-08-02 PROCEDURE — 85027 COMPLETE CBC AUTOMATED: CPT

## 2024-08-02 PROCEDURE — 83735 ASSAY OF MAGNESIUM: CPT

## 2024-08-02 PROCEDURE — 6360000002 HC RX W HCPCS: Performed by: INTERNAL MEDICINE

## 2024-08-02 PROCEDURE — 84100 ASSAY OF PHOSPHORUS: CPT

## 2024-08-02 PROCEDURE — 36415 COLL VENOUS BLD VENIPUNCTURE: CPT

## 2024-08-02 PROCEDURE — 6360000002 HC RX W HCPCS: Performed by: FAMILY MEDICINE

## 2024-08-02 PROCEDURE — 80053 COMPREHEN METABOLIC PANEL: CPT

## 2024-08-02 PROCEDURE — 6370000000 HC RX 637 (ALT 250 FOR IP): Performed by: FAMILY MEDICINE

## 2024-08-02 RX ORDER — OXYCODONE HYDROCHLORIDE 5 MG/1
5 TABLET ORAL EVERY 6 HOURS PRN
Qty: 12 TABLET | Refills: 0 | Status: SHIPPED | OUTPATIENT
Start: 2024-08-02 | End: 2024-08-05

## 2024-08-02 RX ORDER — LEVOFLOXACIN 500 MG/1
750 TABLET, FILM COATED ORAL EVERY OTHER DAY
Status: DISCONTINUED | OUTPATIENT
Start: 2024-08-02 | End: 2024-08-02

## 2024-08-02 RX ORDER — LEVOFLOXACIN 750 MG/1
750 TABLET, FILM COATED ORAL EVERY 24 HOURS
Status: DISCONTINUED | OUTPATIENT
Start: 2024-08-02 | End: 2024-08-02 | Stop reason: HOSPADM

## 2024-08-02 RX ORDER — PREDNISONE 5 MG/1
5 TABLET ORAL DAILY
COMMUNITY

## 2024-08-02 RX ORDER — LEVOFLOXACIN 750 MG/1
750 TABLET, FILM COATED ORAL EVERY 24 HOURS
Qty: 5 TABLET | Refills: 0 | Status: SHIPPED | OUTPATIENT
Start: 2024-08-02 | End: 2024-08-07

## 2024-08-02 RX ADMIN — HEPARIN SODIUM 5000 UNITS: 5000 INJECTION INTRAVENOUS; SUBCUTANEOUS at 05:34

## 2024-08-02 RX ADMIN — PREDNISONE 5 MG: 5 TABLET ORAL at 09:47

## 2024-08-02 RX ADMIN — PANTOPRAZOLE SODIUM 40 MG: 40 TABLET, DELAYED RELEASE ORAL at 05:34

## 2024-08-02 RX ADMIN — MORPHINE SULFATE 4 MG: 4 INJECTION, SOLUTION INTRAMUSCULAR; INTRAVENOUS at 14:42

## 2024-08-02 RX ADMIN — CARVEDILOL 6.25 MG: 6.25 TABLET, FILM COATED ORAL at 09:47

## 2024-08-02 RX ADMIN — TACROLIMUS 4 MG: 1 CAPSULE ORAL at 09:47

## 2024-08-02 RX ADMIN — LEVOFLOXACIN 750 MG: 750 TABLET, FILM COATED ORAL at 14:42

## 2024-08-02 RX ADMIN — MORPHINE SULFATE 4 MG: 4 INJECTION, SOLUTION INTRAMUSCULAR; INTRAVENOUS at 09:52

## 2024-08-02 RX ADMIN — HEPARIN SODIUM 5000 UNITS: 5000 INJECTION INTRAVENOUS; SUBCUTANEOUS at 14:42

## 2024-08-02 ASSESSMENT — PAIN SCALES - GENERAL
PAINLEVEL_OUTOF10: 8
PAINLEVEL_OUTOF10: 0
PAINLEVEL_OUTOF10: 8

## 2024-08-02 ASSESSMENT — PAIN DESCRIPTION - LOCATION: LOCATION: BACK

## 2024-08-02 NOTE — PROGRESS NOTES
Renal Dosing/Monitoring  Medication: Levofloxacin   Current regimen:  500 mg PO every 48 hr  Recent Labs     07/31/24  0049 08/01/24  1512 08/02/24  0545   CREATININE 2.12* 1.70* 1.48*   BUN 45* 37* 31*     Estimated CrCl:  68 ml/min  Plan: Change to 750 mg PO every 24 hours per [facility] P&T Committee Protocol with respect to renal function.  Pharmacy will continue to monitor patient daily and will make dosage adjustments based upon changing renal function.

## 2024-08-02 NOTE — DISCHARGE INSTRUCTIONS
Discharge Instructions       PATIENT ID: Bharath Calderón  MRN: [id number]   YOB: 1985    DATE OF ADMISSION: [unfilled]    DATE OF DISCHARGE: 8/2/2024    PRIMARY CARE PROVIDER: @PCP@     ATTENDING PHYSICIAN: [unfilled]  DISCHARGING PROVIDER: Dilma Edward MD    To contact this individual call [phone] and ask the  to page.   If unavailable ask to be transferred the Adult Hospitalist Department.    DISCHARGE DIAGNOSES LINNETTE, acute pyelonephritis     CONSULTATIONS: [unfilled]    PROCEDURES/SURGERIES: * No surgery found *    PENDING TEST RESULTS:   At the time of discharge the following test results are still pending: none     FOLLOW UP APPOINTMENTS:   [unfilled]     ADDITIONAL CARE RECOMMENDATIONS:   Take oral antibiotics levaquin once a day, next dose tomorrow morning, last dose will be 8/7/2024   Temporally stopping your home blood pressure medicine amlodipine,  Should follow up your PCP within one week for blood pressure check , you may restart this medicine if Blood pressure elevated   Follow up your kidney doctor in 1-2 weeks     DIET: regular diet      ACTIVITY: activity as tolerated          Radiology      DISCHARGE MEDICATIONS:   See Medication Reconciliation Form    It is important that you take the medication exactly as they are prescribed.   Keep your medication in the bottles provided by the pharmacist and keep a list of the medication names, dosages, and times to be taken in your wallet.   Do not take other medications without consulting your doctor.       NOTIFY YOUR PHYSICIAN FOR ANY OF THE FOLLOWING:   Fever over 101 degrees for 24 hours.   Chest pain, shortness of breath, fever, chills, nausea, vomiting, diarrhea, change in mentation, falling, weakness, bleeding. Severe pain or pain not relieved by medications.  Or, any other signs or symptoms that you may have questions about.      DISPOSITION:    Home With:   OT  PT  HH  RN       SNF/Inpatient Rehab/LTAC     Independent/assisted living    Hospice   x Other:     CDMP Checked:   Yes x     PROBLEM LIST Updated:  Yes x       Signed:   Dilma Edward MD  8/2/2024  9:33 AM

## 2024-08-02 NOTE — DISCHARGE SUMMARY
Discharge Summary       PATIENT ID: Bharath Calderón  MRN: [id number]   YOB: 1985    DATE OF ADMISSION: 7/25/2024  4:04 PM    DATE OF DISCHARGE: 8/2/2024   PRIMARY CARE PROVIDER: Tamiko Hassan APRN - NP     ATTENDING PHYSICIAN: DILMA EDWARD MD   DISCHARGING PROVIDER: Dilma Edward MD    To contact this individual call [phone] and ask the  to page.  If unavailable ask to be transferred the Adult Hospitalist Department.    CONSULTATIONS: IP CONSULT TO NEPHROLOGY  IP CONSULT TO NEPHROLOGY  IP CONSULT TO INFECTIOUS DISEASES    PROCEDURES/SURGERIES: * No surgery found *    ADMITTING DIAGNOSES & HOSPITAL COURSE:   Bharath Calderón is a 39 y.o. 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  Associated sx include myalgia, fever, and right flank pain.      In the ED, she was febrile to 103.2, and tachycardic to 115.  Other VSS.  Labs showed WBC 12.3, BUN 23, and creatinine 2.17.   UA showed moderated leukocyte esterase, 20-50 WBC, and 1+ bacteria, Urine culture ordered, and pending.     In the ED, she received IV morphine, ceftriaxone, tylenol, and 1L normal saline bolus        DISCHARGE DIAGNOSES / PLAN:      Sepsis due to urinary tract infection, sepsis evidenced by fever Tmax of 103.2, tachycardia, leukocytosis.  CT without hydronephrosis of the right transplanted kidney.  Urine culture grew E. coli  -- SIRS resolved  -- Continue IV ceftriaxone  -- Blood cultures negative  --ID following :Can transition to Levaquin  through 8/7/24   Renal function close to baseline now. Levaquin dose adjusted         Acute hypoxic respiratory failure, wheezing, dyspneic and hypoxic 7/28  Fluid overload/acute pulm edema  -- Resolved with IV diuretics  -- 7/30, shortness of breath and wheezing.  Status post Lasix 20 mg IV x 1,  bronchodilators.    -resolved               #ESRD due to lupus nephritis, she has been on hemodialysis for 7 years and is now status post kidney transplant  #LINNETTE, prerenal versus ATN due to sepsis.  Worsening.  -Continue aggressive IV hydration.  Monitor creatinine, input and output.  -Nephrology consulted, advised to hold Myfortic due to active infection, continue Prograf and prednisone.  -- Creatinine continues to improve.  Cr down to 1.4   Ok to resume Myfortic at discharge per renal         Chronic hypertension, BP soft/episode of hypotension.  Hold carvedilol     #GERD  #steroid use  -continue PPI          PENDING TEST RESULTS:   At the time of discharge the following test results are still pending: NONE     [facility]    [facility]     ADDITIONAL CARE RECOMMENDATIONS:   Take oral antibiotics levaquin once a day, next dose tomorrow morning, last dose will be 8/7/2024   Temporally stopping your home blood pressure medicine amlodipine,  Should follow up your PCP within one week for blood pressure check , you may restart this medicine if Blood pressure elevated   Follow up your kidney doctor in 1-2 weeks     DIET: regular diet    ACTIVITY: activity as tolerated          DISCHARGE MEDICATIONS:     Medication List        START taking these medications      levoFLOXacin 750 MG tablet  Commonly known as: LEVAQUIN  Take 1 tablet by mouth every 24 hours for 5 days     oxyCODONE 5 MG immediate release tablet  Commonly known as: Roxicodone  Take 1 tablet by mouth every 6 hours as needed for Pain for up to 3 days. Intended supply: 3 days. Take lowest dose possible to manage pain Max Daily Amount: 20 mg            CONTINUE taking these medications      albuterol sulfate  (90 Base) MCG/ACT inhaler  Commonly known as: PROVENTIL;VENTOLIN;PROAIR  Inhale 2 puffs into the lungs every 4 hours as needed for Wheezing     amitriptyline 150 MG tablet  Commonly known as: ELAVIL  Take 1 tablet by mouth nightly      carvedilol 25 MG tablet  Commonly known as: COREG     fluticasone 50 MCG/ACT nasal spray  Commonly known as: FLONASE     Mirena (52 MG) IUD 52 mg  Generic drug: levonorgestrel     Mycophenolate Sodium 360 MG Tbec     pantoprazole 40 MG tablet  Commonly known as: PROTONIX     predniSONE 5 MG tablet  Commonly known as: DELTASONE     Sennosides 8.6 MG Caps     tacrolimus 1 MG capsule  Commonly known as: PROGRAF     vitamin D 1.25 MG (65397 UT) Caps capsule  Commonly known as: ERGOCALCIFEROL            STOP taking these medications      amLODIPine 5 MG tablet  Commonly known as: NORVASC               Where to Get Your Medications        These medications were sent to [facility]/pharmacy #[id number] - [place], [place] - [address] [phone] - F [phone] [address] [address]      Phone: [phone]   levoFLOXacin 750 MG tablet  oxyCODONE 5 MG immediate release tablet           NOTIFY YOUR PHYSICIAN FOR ANY OF THE FOLLOWING:   Fever over 101 degrees for 24 hours.   Chest pain, shortness of breath, fever, chills, nausea, vomiting, diarrhea, change in mentation, falling, weakness, bleeding. Severe pain or pain not relieved by medications.  Or, any other signs or symptoms that you may have questions about.    DISPOSITION:    Home With:   OT  PT  HH  RN       Long term SNF/Inpatient Rehab    Independent/assisted living    Hospice   X Other:       PATIENT CONDITION AT DISCHARGE:     Functional status    Poor     Deconditioned    X Independent      Cognition    X Lucid     Forgetful     Dementia      Catheters/lines (plus indication)    Moran     PICC     PEG    X None      Code status    X Full code     DNR          CHRONIC MEDICAL DIAGNOSES:      Greater than 31 minutes were spent with the patient on counseling and coordination of care    Signed:   Dilma Edward MD  8/2/2024  2:37 PM

## 2024-08-02 NOTE — PROGRESS NOTES
Name: Bharath Calderón   MRN: [id number]  : 1985    Nephrology Progress Note    Assessment:  LINNETTE- likely pre-renal vs early ATN in the setting of Urosepsis/Acute pyelonephritis. CT A/P with no hydronephrosis of transplant kidney. UA with pyuria and protein of 100 on dipstick. Denies missing any IS meds.      S/P CKT (, [facility], RLQ)- basln Cr normal as off  based on review of records     Fever/Urosepsis/Leucocytosis: E.Coli acute pyelonephritis  Hyponatremia-mild  Metabolic acidosis: mild NAGMA  Hx of ESRD-2nd to Lupus nephritis-was on HD at [facility] unit. Still has R AVG in place.   HTN>>Hypotension/tachycardia  Hypercalcemia    Serum Cr showing improvement now to 1.4mg/dl today. Serum Na better at 138. Ca down to 10 (uncorrected)    Plan/Recommendations:  Stable for discharge-> needs to f/u with [facility] Transplant team in 1-2wks  Levofloxacin  Prograf 4 mg BID->  last level from  stable  Resume MYFORTIC on discharge  Ct Prednisone 5 mg QD  Push hydration      Subjective:  Reports feeling much better. Remains off O2  Improving appetite    ROS:   No nausea, no vomiting  No chest pain, No shortness of breath    Exam:  /87   Pulse 96   Temp 98.6 °F (37 °C) (Oral)   Resp 21   Ht 1.651 m (5' 5\")   Wt 122.2 kg (269 lb 6.4 oz)   SpO2 94%   BMI 44.83 kg/m²     Gen: NAD/Obese  HEENT:  AT/NC  Lungs/Chest wall: Clear. Equal BS. No respiratory distress  Cardiovascular: Regular rate, normal rhythm.   Abdomen/: Soft, No TTP over RLQ transplant kidney  Ext: No peripheral edema  CNS: alert and awake.     Current Facility-Administered Medications   Medication Dose Route Frequency Provider Last Rate Last Admin    levoFLOXacin (LEVAQUIN) tablet 750 mg  750 mg Oral Q24H Dilma Edward MD   750 mg at 24 1442    carvedilol (COREG) tablet 6.25 mg  6.25 mg Oral BID

## 2024-08-02 NOTE — PLAN OF CARE
Problem: Discharge Planning  Goal: Discharge to home or other facility with appropriate resources  Outcome: Adequate for Discharge     Problem: Pain  Goal: Verbalizes/displays adequate comfort level or baseline comfort level  Outcome: Adequate for Discharge     Problem: ABCDS Injury Assessment  Goal: Absence of physical injury  Outcome: Adequate for Discharge     Problem: Safety - Adult  Goal: Free from fall injury  Outcome: Adequate for Discharge     Problem: Respiratory - Adult  Goal: Achieves optimal ventilation and oxygenation  Outcome: Adequate for Discharge     Problem: Cardiovascular - Adult  Goal: Maintains optimal cardiac output and hemodynamic stability  Outcome: Adequate for Discharge     Problem: Gastrointestinal - Adult  Goal: Maintains or returns to baseline bowel function  Outcome: Adequate for Discharge     Problem: Genitourinary - Adult  Goal: Absence of urinary retention  Outcome: Adequate for Discharge     Problem: Metabolic/Fluid and Electrolytes - Adult  Goal: Electrolytes maintained within normal limits  Outcome: Adequate for Discharge  Goal: Hemodynamic stability and optimal renal function maintained  Outcome: Adequate for Discharge     Problem: Skin/Tissue Integrity  Goal: Absence of new skin breakdown  Description: 1.  Monitor for areas of redness and/or skin breakdown  2.  Assess vascular access sites hourly  3.  Every 4-6 hours minimum:  Change oxygen saturation probe site  4.  Every 4-6 hours:  If on nasal continuous positive airway pressure, respiratory therapy assess nares and determine need for appliance change or resting period.  Outcome: Adequate for Discharge     Problem: Nutrition Deficit:  Goal: Optimize nutritional status  Outcome: Adequate for Discharge    Pt given discharge paperwork.  Reviewed med updates, BEFAST and After Visit Report.  Discussed follow-up visits and informed pt of where and how to get meds.  Removed lines/leads, packed pt belongings and wheeled to  discharge.

## 2024-08-05 ENCOUNTER — TELEPHONE (OUTPATIENT)
Age: 39
End: 2024-08-05

## 2024-08-05 NOTE — TELEPHONE ENCOUNTER
Care Transitions Initial Follow Up Call    Outreach made within 2 business days of discharge: Yes    Patient: Bharath Calderón Patient : 1985   MRN: [id number]  Reason for Admission: Sepsis    Discharge Date: 24       Spoke with: patient     Discharge department/facility: [facility] Interactive Patient Contact:  Was patient able to fill all prescriptions: Yes  Was patient instructed to bring all medications to the follow-up visit: Yes  Is patient taking all medications as directed in the discharge summary? Yes  Does patient understand their discharge instructions: Yes  Does patient have questions or concerns that need addressed prior to 7-14 day follow up office visit: no    Additional needs identified to be addressed with provider  No needs identified             Scheduled appointment with PCP within 7-14 days    Follow Up  Future Appointments   Date Time Provider Department Center   2024 11:00 AM Tmaiko Hassan APRN - NP [facility]   10/10/2024  9:00 AM Mike Aguirre MD [id number]       Yaneth Faust LPN

## 2024-08-08 ENCOUNTER — TELEPHONE (OUTPATIENT)
Age: 39
End: 2024-08-08

## 2024-08-08 LAB — TACROLIMUS BLOOD: 6.8 NG/ML

## 2024-08-08 NOTE — PROGRESS NOTES
Physician Progress Note      PATIENT:               CHATA CADET  [facility] #:                  [id number]  :                       1985  ADMIT DATE:       2024 4:04 PM  DISCH DATE:        2024 5:56 PM  RESPONDING  PROVIDER #:        Dilma Edward MD          QUERY TEXT:    Patient admitted with sepsis. Noted to have dietician assessment with   malnutrition diagnosis. If possible, please document in progress notes and   discharge summary if you are evaluating and /or treating any of the following:    The medical record reflects the following:  Risk Factors: malnutrition    Clinical Indicators:  Malnutrition Status:  Mild malnutrition (24 1111)  Context:  Acute Illness  Findings of the 6 clinical characteristics of malnutrition:  Energy Intake:  50% or less of estimated energy requirements for 5 or more   days  Weight Loss:  No significant weight loss  Body Fat Loss:  No significant body fat loss  Muscle Mass Loss:  No significant muscle mass loss  Fluid Accumulation:  No significant fluid accumulation   Strength:  Not Performed    Anthropometric Measures:  Height: 165.1 cm (5' 5\")  Ideal Body Weight (IBW): 125 lbs (57 kg)  Admission Body Weight: 126.1 kg (278 lb)  Current Body Weight: 122 kg (269 lb), 215.2 % IBW. Weight Source: Bed Scale  Current BMI (kg/m2): 44.8  Usual Body Weight: 123.8 kg (273 lb) (per pt report)  % Weight Change (Calculated): -1.5  Weight Adjustment For: No Adjustment  BMI Categories: Obese Class 3 (BMI 40.0 or greater    Treatment:  Nutrition Recommendations/Plan:  1. Continue current diet  2. Consider ONS if intake does not improve  3. Consider bowel regimen if no BM  4. Monitor Phos (2.5 on )    Thank you,  Kerline Severino RN CDI  CRCR  Clinical Documentation  [phone] or via Perfect Serve  [email or web address]    ASPEN Criteria:    https://aspenjournals.onlinelibrary.riley.com/doi/full/10.1177/636373017022281  5  Options provided:  -- Protein calorie malnutrition

## 2024-08-08 NOTE — TELEPHONE ENCOUNTER
----- Message from César Jono Thakkar sent at 8/8/2024 11:02 AM EDT -----  Regarding: ECC Message to Provider  ECC Message to Provider    Relationship to Patient: Self     Additional Information: Patient requesting for the paperwork that was faxed over to the office to be signed by her PCP, Tamiko Hassan APRN - NP and sent back in regards to the patient's B weight loss surgery.  --------------------------------------------------------------------------------------------------------------------------    Call Back Information: OK to leave message on voicemail  Preferred Call Back Number: Phone [phone]

## 2024-08-15 ENCOUNTER — TELEPHONE (OUTPATIENT)
Age: 39
End: 2024-08-15

## 2024-08-15 NOTE — TELEPHONE ENCOUNTER
Patient called wanting to know if the pre-surgical paperwork form [facility] was received and signed. Please call her back at [phone]

## 2024-08-16 ENCOUNTER — TELEPHONE (OUTPATIENT)
Age: 39
End: 2024-08-16

## 2024-08-16 ENCOUNTER — APPOINTMENT (OUTPATIENT)
Facility: HOSPITAL | Age: 39
End: 2024-08-16
Payer: MEDICARE

## 2024-08-16 ENCOUNTER — HOSPITAL ENCOUNTER (EMERGENCY)
Facility: HOSPITAL | Age: 39
Discharge: HOME | End: 2024-08-16
Attending: STUDENT IN AN ORGANIZED HEALTH CARE EDUCATION/TRAINING PROGRAM
Payer: MEDICARE

## 2024-08-16 VITALS
TEMPERATURE: 97.7 F | OXYGEN SATURATION: 98 % | RESPIRATION RATE: 12 BRPM | SYSTOLIC BLOOD PRESSURE: 122 MMHG | DIASTOLIC BLOOD PRESSURE: 86 MMHG | WEIGHT: 270 LBS | BODY MASS INDEX: 44.98 KG/M2 | HEART RATE: 78 BPM | HEIGHT: 65 IN

## 2024-08-16 DIAGNOSIS — M17.11 OSTEOARTHRITIS OF RIGHT KNEE, UNSPECIFIED OSTEOARTHRITIS TYPE: Primary | ICD-10-CM

## 2024-08-16 DIAGNOSIS — M25.561 ACUTE PAIN OF RIGHT KNEE: ICD-10-CM

## 2024-08-16 PROCEDURE — 73562 X-RAY EXAM OF KNEE 3: CPT

## 2024-08-16 PROCEDURE — 99283 EMERGENCY DEPT VISIT LOW MDM: CPT

## 2024-08-16 ASSESSMENT — PAIN SCALES - GENERAL
PAINLEVEL_OUTOF10: 10
PAINLEVEL_OUTOF10: 10

## 2024-08-16 ASSESSMENT — PAIN DESCRIPTION - ORIENTATION
ORIENTATION: RIGHT
ORIENTATION: RIGHT

## 2024-08-16 ASSESSMENT — PAIN DESCRIPTION - LOCATION
LOCATION: KNEE
LOCATION: KNEE

## 2024-08-16 ASSESSMENT — PAIN DESCRIPTION - DESCRIPTORS: DESCRIPTORS: ACHING;SHARP

## 2024-08-16 ASSESSMENT — PAIN - FUNCTIONAL ASSESSMENT: PAIN_FUNCTIONAL_ASSESSMENT: 0-10

## 2024-08-16 NOTE — DISCHARGE INSTRUCTIONS
Try ice or heat to the knee  Contact orthopedics for consult and options for pain management  Use diclofenac gel to the knee   Continue to be as active as you are able

## 2024-08-16 NOTE — ED TRIAGE NOTES
TO ED WITH RIGHT PAIN FOR 1 WEEK, DENIES TRAUMA OR INJURY. STATES SHE IS UNABLE TO WALK IN PMD OFFICE TODAY.

## 2024-08-17 NOTE — ED PROVIDER NOTES
Never true     Ran Out of Food in the Last Year: Never true   Transportation Needs: No Transportation Needs (7/26/2024)    PRAPARE - Transportation     Lack of Transportation (Medical): No     Lack of Transportation (Non-Medical): No   Housing Stability: Low Risk  (7/26/2024)    Housing Stability Vital Sign     Unable to Pay for Housing in the Last Year: No     Number of Places Lived in the Last Year: 1     Unstable Housing in the Last Year: No           PHYSICAL EXAM    (up to 7 for level 4, 8 or more for level 5)     ED Triage Vitals [08/16/24 1413]   BP Systolic BP Percentile Diastolic BP Percentile Temp Temp Source Pulse Respirations SpO2   (!) 114/90 -- -- 98.1 °F (36.7 °C) Oral 81 12 100 %      Height Weight - Scale         1.651 m (5' 5\") 122.5 kg (270 lb)             Body mass index is 44.93 kg/m².    Physical Exam  Constitutional:       Appearance: She is obese.   Musculoskeletal:      Comments: The right knee has slight swelling, and is slightly warm, but there is no erythema, redness.    The calf is nontender, there is no calf swelling.  There is no erythema to the calf.    Distal extremity is warm and dry with intact sensation to the tips.    There is decreased range of motion due to pain in the right knee.  As the patient is unable to fully flex her right knee, I am unable to check ligaments   Neurological:      Mental Status: She is alert.         DIAGNOSTIC RESULTS     EKG: All EKG's are interpreted by the Emergency Department Physician who either signs or Co-signs this chart in the absence of a cardiologist.        RADIOLOGY:   Non-plain film images such as CT, Ultrasound and MRI are read by the radiologist. Plain radiographic images are visualized and preliminarily interpreted by the emergency physician with the below findings:        Interpretation per the Radiologist below, if available at the time of this note:    XR KNEE RIGHT (3 VIEWS)   Final Result      1. No acute fracture or traumatic  Reviewed  Radiology: ordered.            REASSESSMENT            CONSULTS:  None    PROCEDURES:  Unless otherwise noted below, none     Procedures      FINAL IMPRESSION      1. Osteoarthritis of right knee, unspecified osteoarthritis type    2. Acute pain of right knee          DISPOSITION/PLAN   DISPOSITION Decision To Discharge 08/16/2024 04:43:29 PM      PATIENT REFERRED TO:  Tamiko Hassan APRN - NP  [address]  [facility] [address] [phone]    Schedule an appointment as soon as possible for a visit       [address]  [address]  [facility] [address] [phone]  Schedule an appointment as soon as possible for a visit   call to ask about physical therapy as well    [facility]  [address]  [facility] [address] [phone]    If symptoms worsen      DISCHARGE MEDICATIONS:  Discharge Medication List as of 8/16/2024  5:03 PM        START taking these medications    Details   diclofenac sodium (VOLTAREN) 1 % GEL Apply 4 g topically 4 times daily, Topical, 4 TIMES DAILY Starting Fri 8/16/2024, Disp-100 g, R-0, Normal               (Please note that portions of this note were completed with a voice recognition program.  Efforts were made to edit the dictations but occasionally words are mis-transcribed.)    Irais Cline PA-C (electronically signed)  Emergency Attending Physician / Physician Assistant / Nurse Practitioner              Irais Cline PA-C  08/17/24 0390

## 2024-08-23 RX ORDER — AMITRIPTYLINE HYDROCHLORIDE 150 MG/1
150 TABLET ORAL NIGHTLY
Qty: 90 TABLET | Refills: 3 | Status: SHIPPED | OUTPATIENT
Start: 2024-08-23

## 2024-09-05 ENCOUNTER — TELEPHONE (OUTPATIENT)
Age: 39
End: 2024-09-05

## 2024-09-18 ENCOUNTER — TELEPHONE (OUTPATIENT)
Age: 39
End: 2024-09-18

## 2024-09-23 ENCOUNTER — OFFICE VISIT (OUTPATIENT)
Age: 39
End: 2024-09-23
Payer: MEDICARE

## 2024-09-23 VITALS
SYSTOLIC BLOOD PRESSURE: 134 MMHG | BODY MASS INDEX: 42.03 KG/M2 | TEMPERATURE: 98 F | WEIGHT: 252.3 LBS | HEIGHT: 65 IN | RESPIRATION RATE: 17 BRPM | OXYGEN SATURATION: 98 % | HEART RATE: 76 BPM | DIASTOLIC BLOOD PRESSURE: 86 MMHG

## 2024-09-23 DIAGNOSIS — Z01.818 PRE-OP EXAM: Primary | ICD-10-CM

## 2024-09-23 DIAGNOSIS — B37.2 YEAST DERMATITIS: ICD-10-CM

## 2024-09-23 DIAGNOSIS — Z87.09 HISTORY OF ASTHMA: ICD-10-CM

## 2024-09-23 DIAGNOSIS — M32.9 LUPUS: ICD-10-CM

## 2024-09-23 DIAGNOSIS — M17.11 PRIMARY OSTEOARTHRITIS OF RIGHT KNEE: ICD-10-CM

## 2024-09-23 DIAGNOSIS — E66.01 MORBID OBESITY: ICD-10-CM

## 2024-09-23 DIAGNOSIS — Z94.0 KIDNEY TRANSPLANTED: ICD-10-CM

## 2024-09-23 PROCEDURE — 3075F SYST BP GE 130 - 139MM HG: CPT | Performed by: INTERNAL MEDICINE

## 2024-09-23 PROCEDURE — 1036F TOBACCO NON-USER: CPT | Performed by: INTERNAL MEDICINE

## 2024-09-23 PROCEDURE — G8427 DOCREV CUR MEDS BY ELIG CLIN: HCPCS | Performed by: INTERNAL MEDICINE

## 2024-09-23 PROCEDURE — 99214 OFFICE O/P EST MOD 30 MIN: CPT | Performed by: INTERNAL MEDICINE

## 2024-09-23 PROCEDURE — 3079F DIAST BP 80-89 MM HG: CPT | Performed by: INTERNAL MEDICINE

## 2024-09-23 PROCEDURE — G8417 CALC BMI ABV UP PARAM F/U: HCPCS | Performed by: INTERNAL MEDICINE

## 2024-09-23 RX ORDER — NYSTATIN 100000 [USP'U]/G
POWDER TOPICAL
Qty: 1 EACH | Refills: 1 | Status: SHIPPED | OUTPATIENT
Start: 2024-09-23

## 2024-09-23 RX ORDER — OXYCODONE AND ACETAMINOPHEN 5; 325 MG/1; MG/1
1 TABLET ORAL EVERY 8 HOURS PRN
Qty: 12 TABLET | Refills: 0 | Status: SHIPPED | OUTPATIENT
Start: 2024-09-23 | End: 2024-09-27

## 2024-09-23 ASSESSMENT — ENCOUNTER SYMPTOMS
RESPIRATORY NEGATIVE: 1
ABDOMINAL PAIN: 0

## 2024-10-11 NOTE — PROGRESS NOTES
Gait: Normal    Assessment:       ICD-10-CM    1. Chronic pain of left knee  M25.562 CLAUDIA - Vince Perez MD, Orthopedic Surgery (knee), [place]    G89.29       2. Primary insomnia  F51.01       3. Chronic pain syndrome  G89.4             Plan:   Will refer to ortho to evaluate and treat her left knee pain  2. Continue Amitriptyline 150 mg QHS for insomnia  3. Follow up with rheumatologist regarding lupus  4. Follow up with kidney transplant doctor           Return in about 1 year (around 10/14/2025).           Mike Aguirre MD  Diplomate, American Board of Psychiatry and Neurology  Diplomate, Neuromuscular Medicine  Diplomate, American Board of Electrodiagnostic Medicine

## 2024-10-14 ENCOUNTER — OFFICE VISIT (OUTPATIENT)
Age: 39
End: 2024-10-14
Payer: MEDICARE

## 2024-10-14 VITALS
BODY MASS INDEX: 41.98 KG/M2 | HEIGHT: 65 IN | HEART RATE: 85 BPM | SYSTOLIC BLOOD PRESSURE: 120 MMHG | OXYGEN SATURATION: 100 % | DIASTOLIC BLOOD PRESSURE: 70 MMHG | TEMPERATURE: 95.7 F

## 2024-10-14 DIAGNOSIS — G89.4 CHRONIC PAIN SYNDROME: ICD-10-CM

## 2024-10-14 DIAGNOSIS — M25.562 CHRONIC PAIN OF LEFT KNEE: Primary | ICD-10-CM

## 2024-10-14 DIAGNOSIS — G89.29 CHRONIC PAIN OF LEFT KNEE: Primary | ICD-10-CM

## 2024-10-14 DIAGNOSIS — F51.01 PRIMARY INSOMNIA: ICD-10-CM

## 2024-10-14 PROCEDURE — G8484 FLU IMMUNIZE NO ADMIN: HCPCS | Performed by: PSYCHIATRY & NEUROLOGY

## 2024-10-14 PROCEDURE — G8428 CUR MEDS NOT DOCUMENT: HCPCS | Performed by: PSYCHIATRY & NEUROLOGY

## 2024-10-14 PROCEDURE — 1036F TOBACCO NON-USER: CPT | Performed by: PSYCHIATRY & NEUROLOGY

## 2024-10-14 PROCEDURE — G8417 CALC BMI ABV UP PARAM F/U: HCPCS | Performed by: PSYCHIATRY & NEUROLOGY

## 2024-10-14 PROCEDURE — 99214 OFFICE O/P EST MOD 30 MIN: CPT | Performed by: PSYCHIATRY & NEUROLOGY

## 2024-10-14 PROCEDURE — 3074F SYST BP LT 130 MM HG: CPT | Performed by: PSYCHIATRY & NEUROLOGY

## 2024-10-14 PROCEDURE — 3078F DIAST BP <80 MM HG: CPT | Performed by: PSYCHIATRY & NEUROLOGY

## 2024-10-27 NOTE — PROGRESS NOTES
Bedside and Verbal shift change report given to Annie Roth RN (oncoming nurse) by Duyen Singleton RN (offgoing nurse). Report included the following information SBAR, Kardex, Intake/Output, Recent Results and Cardiac Rhythm NSR. The patient is status has essentially remained unchanged the past several days.  As long as she is on some degree of negative chest tube suction the lung remains fair reexpanded with a lateral airspace.  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  There is purulent drainage coming from the chest tubes.  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  The only way I see to resolve this issue is to reoperate with attempted further decortication of the left lung with attempted reexpansion is much as possible and trying to repair the bronchopleural fistula.  I discussed this at length with the patient and she agrees to proceed.  The plan is to obtain a chest CT scan today to further delineate the degree of trapped lung and perhaps locate the suspected area of bronchopleural fistula.  Proceed with operation sometime in the next couple of days.

## 2024-11-19 NOTE — ED NOTES
2126 Pt's monitor alarming for low O2 sats around 89-90s% and placed on 2LNC. O2 sats up to 96%. 2208 Pt transported to CT via stretcher. 2337 Report attempted.
Bedside and Verbal shift change report given to Meri (oncoming nurse) by Chrissie Boland (offgoing nurse). Report included the following information SBAR and ED Summary.
TRANSFER - OUT REPORT:    Verbal report given to Carey Watson RN (name) on Angella Jimenez  being transferred to [facility](unit) for routine progression of care       Report consisted of patients Situation, Background, Assessment and   Recommendations(SBAR). Information from the following report(s) SBAR, Kardex, ED Summary, Intake/Output, MAR, Recent Results and Cardiac Rhythm ST was reviewed with the receiving nurse. Lines:   Peripheral IV 08/15/18 Left; Lower Arm (Active)   Site Assessment Clean, dry, & intact 8/15/2018  7:11 PM   Phlebitis Assessment 0 8/15/2018  7:11 PM   Infiltration Assessment 0 8/15/2018  7:11 PM   Dressing Status Clean, dry, & intact 8/15/2018  7:11 PM   Dressing Type Transparent 8/15/2018  7:11 PM   Hub Color/Line Status Pink 8/15/2018  7:11 PM        Opportunity for questions and clarification was provided.       Patient transported with:   Monitor
No

## 2024-12-13 DIAGNOSIS — M17.11 PRIMARY OSTEOARTHRITIS OF RIGHT KNEE: Primary | ICD-10-CM

## 2024-12-13 RX ORDER — OXYCODONE HYDROCHLORIDE 5 MG/1
1 TABLET ORAL EVERY 6 HOURS
COMMUNITY
End: 2024-12-13 | Stop reason: SDUPTHER

## 2024-12-14 RX ORDER — OXYCODONE HYDROCHLORIDE 5 MG/1
5 TABLET ORAL EVERY 8 HOURS PRN
Qty: 12 TABLET | Refills: 0 | Status: SHIPPED | OUTPATIENT
Start: 2024-12-14 | End: 2024-12-18

## 2025-03-14 RX ORDER — OXYCODONE HYDROCHLORIDE 5 MG/1
5 TABLET ORAL EVERY 6 HOURS PRN
Qty: 12 TABLET | Refills: 0 | OUTPATIENT
Start: 2025-03-14 | End: 2025-03-17

## 2025-03-14 NOTE — TELEPHONE ENCOUNTER
[facility]/pharmacy #[id number] - [place], [place] - [address] - P [phone] - F [phone].    oxyCODONE (ROXICODONE) 5 MG immediate release tablet     Patient said that she is in severe pain.    09/23/2024 04/24/2025

## 2025-03-18 ENCOUNTER — OFFICE VISIT (OUTPATIENT)
Age: 40
End: 2025-03-18
Payer: MEDICARE

## 2025-03-18 VITALS
HEART RATE: 87 BPM | DIASTOLIC BLOOD PRESSURE: 76 MMHG | BODY MASS INDEX: 45.32 KG/M2 | RESPIRATION RATE: 22 BRPM | OXYGEN SATURATION: 97 % | SYSTOLIC BLOOD PRESSURE: 112 MMHG | WEIGHT: 272 LBS | HEIGHT: 65 IN | TEMPERATURE: 98.6 F

## 2025-03-18 DIAGNOSIS — R39.15 URGENCY OF URINATION: ICD-10-CM

## 2025-03-18 DIAGNOSIS — M25.561 CHRONIC PAIN OF RIGHT KNEE: Primary | ICD-10-CM

## 2025-03-18 DIAGNOSIS — G89.29 CHRONIC PAIN OF RIGHT KNEE: Primary | ICD-10-CM

## 2025-03-18 LAB
BILIRUBIN, URINE, POC: NEGATIVE
BLOOD URINE, POC: NEGATIVE
GLUCOSE URINE, POC: NEGATIVE
KETONES, URINE, POC: NEGATIVE
LEUKOCYTE ESTERASE, URINE, POC: ABNORMAL
NITRITE, URINE, POC: NEGATIVE
PH, URINE, POC: 7 (ref 4.6–8)
PROTEIN,URINE, POC: ABNORMAL
SPECIFIC GRAVITY, URINE, POC: 1.03 (ref 1–1.03)
URINALYSIS CLARITY, POC: ABNORMAL
URINALYSIS COLOR, POC: ABNORMAL
UROBILINOGEN, POC: NORMAL

## 2025-03-18 PROCEDURE — PBSHW AMB POC URINALYSIS DIP STICK AUTO W/O MICRO: Performed by: INTERNAL MEDICINE

## 2025-03-18 PROCEDURE — 81003 URINALYSIS AUTO W/O SCOPE: CPT | Performed by: INTERNAL MEDICINE

## 2025-03-18 PROCEDURE — 99213 OFFICE O/P EST LOW 20 MIN: CPT | Performed by: INTERNAL MEDICINE

## 2025-03-18 PROCEDURE — 3078F DIAST BP <80 MM HG: CPT | Performed by: INTERNAL MEDICINE

## 2025-03-18 PROCEDURE — 3074F SYST BP LT 130 MM HG: CPT | Performed by: INTERNAL MEDICINE

## 2025-03-18 RX ORDER — ONDANSETRON 4 MG/1
TABLET, ORALLY DISINTEGRATING ORAL
COMMUNITY

## 2025-03-18 RX ORDER — UBIDECARENONE 30 MG
CAPSULE ORAL
COMMUNITY

## 2025-03-18 RX ORDER — OXYCODONE AND ACETAMINOPHEN 5; 325 MG/1; MG/1
1 TABLET ORAL EVERY 8 HOURS PRN
Qty: 12 TABLET | Refills: 0 | Status: SHIPPED | OUTPATIENT
Start: 2025-03-18 | End: 2025-03-22

## 2025-03-18 SDOH — ECONOMIC STABILITY: FOOD INSECURITY: WITHIN THE PAST 12 MONTHS, THE FOOD YOU BOUGHT JUST DIDN'T LAST AND YOU DIDN'T HAVE MONEY TO GET MORE.: NEVER TRUE

## 2025-03-18 SDOH — ECONOMIC STABILITY: FOOD INSECURITY: WITHIN THE PAST 12 MONTHS, YOU WORRIED THAT YOUR FOOD WOULD RUN OUT BEFORE YOU GOT MONEY TO BUY MORE.: NEVER TRUE

## 2025-03-18 ASSESSMENT — PATIENT HEALTH QUESTIONNAIRE - PHQ9
SUM OF ALL RESPONSES TO PHQ QUESTIONS 1-9: 0
SUM OF ALL RESPONSES TO PHQ QUESTIONS 1-9: 0
2. FEELING DOWN, DEPRESSED OR HOPELESS: NOT AT ALL
1. LITTLE INTEREST OR PLEASURE IN DOING THINGS: NOT AT ALL
SUM OF ALL RESPONSES TO PHQ QUESTIONS 1-9: 0
SUM OF ALL RESPONSES TO PHQ QUESTIONS 1-9: 0

## 2025-03-18 ASSESSMENT — ENCOUNTER SYMPTOMS: RESPIRATORY NEGATIVE: 1

## 2025-03-18 NOTE — PROGRESS NOTES
Chief Complaint   Patient presents with    Knee Pain    Frequent/Recurrent UTI       \"Have you been to the ER, urgent care clinic since your last visit?  Hospitalized since your last visit?\"    NO    “Have you seen or consulted any other health care providers outside our system since your last visit?”    NO             
7/31/2024     2:01 AM 7/29/2024     6:00 AM   Weight Metrics   Weight 272 lb 252 lb 4.8 oz 270 lb 269 lb 6.4 oz 275 lb 1.6 oz 270 lb 8.1 oz 276 lb 0.3 oz   BMI (Calculated) 45.4 kg/m2 42.1 kg/m2 45 kg/m2 44.9 kg/m2 45.9 kg/m2 45.1 kg/m2 46 kg/m2       Past Medical History:   Diagnosis Date    Anemia     secondary to lupus    Asthma     no inhaler use in past 2 to 3 years    Carditis     Chronic back pain     Chronic kidney disease     ESRD    Chronic pain     DDD (degenerative disc disease), lumbar     ESRD (end stage renal disease) ([facility])     Fibroid tumor     GERD (gastroesophageal reflux disease)     HCAP (healthcare-associated pneumonia) 7/16/2019    Hemodialysis patient 12/21/2017    [facility]  Tuesday,  Thursday,  and Saturday.     Hypercholesterolemia     Hyperkalemia 3/4/2019    Hypertension     Hypokalemia 10/27/2017    Intra-abdominal abscess ([facility]) 5/12/2018    Intractable nausea and vomiting 10/21/2015    Long term (current) use of anticoagulants     Lupus (systemic lupus erythematosus) ([facility])     Malignant hypertension with chronic kidney disease stage V ([facility])     Peritoneal dialysis status 10/2015    x 2 years Stopped 12/2017 due to infection and removed.    Peritonitis ([facility]) 3/11/2018    Pneumonia 3/14/2020    Poor historian 01/17/2018    With medications    Sepsis ([facility]) 4/29/2018    Thromboembolus ([facility]) 2013    lungs    Transfusion history     Last Transfusion 12/21/2017  at [facility]       Allergies   Allergen Reactions    Prochlorperazine Nausea And Vomiting, Other (See Comments) and Palpitations     Other Reaction(s): Not available, Unknown    Reaction Type: Allergy; Reaction(s): rapid heartrate \"hot and lightheaded\"      \"hot and lightheaded\"    Prochlorperazine Edisylate Nausea And Vomiting and Palpitations     \"hot and lightheaded\"        Current Outpatient Medications on File Prior to Visit   Medication Sig Dispense Refill    Methylcobalamin 1 MG CHEW Take by mouth      Multiple

## 2025-03-20 ENCOUNTER — RESULTS FOLLOW-UP (OUTPATIENT)
Age: 40
End: 2025-03-20

## 2025-03-20 DIAGNOSIS — R39.15 URGENCY OF URINATION: Primary | ICD-10-CM

## 2025-03-20 LAB — BACTERIA UR CULT: ABNORMAL

## 2025-03-20 RX ORDER — CIPROFLOXACIN 500 MG/1
500 TABLET, FILM COATED ORAL 2 TIMES DAILY
Qty: 14 TABLET | Refills: 0 | Status: SHIPPED | OUTPATIENT
Start: 2025-03-20 | End: 2025-03-21

## 2025-03-20 NOTE — TELEPHONE ENCOUNTER
LVM  Regarding labs    ----- Message from WILEY Gay NP sent at 3/20/2025  8:03 AM EDT -----  Call patient: has a UTI. Send in cipro 500 mg BID for 7 days

## 2025-03-21 DIAGNOSIS — R39.15 URGENCY OF URINATION: Primary | ICD-10-CM

## 2025-03-21 RX ORDER — NITROFURANTOIN 25; 75 MG/1; MG/1
100 CAPSULE ORAL 2 TIMES DAILY
Qty: 20 CAPSULE | Refills: 0 | Status: SHIPPED | OUTPATIENT
Start: 2025-03-21 | End: 2025-03-31

## 2025-03-21 NOTE — TELEPHONE ENCOUNTER
Spoke with patient after verifying name and . Notified patient of lab results and recommendation from provider. Patient verbalized understanding and given a chance to ask questions.        ----- Message from WILEY Gay NP sent at 3/21/2025  7:54 AM EDT -----  Resent in Macrobid after culture resulted. Do not take cipro

## 2025-04-08 ENCOUNTER — TELEPHONE (OUTPATIENT)
Age: 40
End: 2025-04-08

## 2025-04-08 DIAGNOSIS — G89.29 CHRONIC PAIN OF RIGHT KNEE: Primary | ICD-10-CM

## 2025-04-08 DIAGNOSIS — M25.561 CHRONIC PAIN OF RIGHT KNEE: Primary | ICD-10-CM

## 2025-04-08 NOTE — TELEPHONE ENCOUNTER
Patient would like to know who anthony would recommend for pain management. Please call her back at [phone]

## 2025-04-09 NOTE — TELEPHONE ENCOUNTER
Returned call. Spoke to patient. Referral placed to:  [facility]: Ava Goldman, FNP-BC

## 2025-04-21 ENCOUNTER — TELEPHONE (OUTPATIENT)
Age: 40
End: 2025-04-21

## 2025-04-21 NOTE — TELEPHONE ENCOUNTER
[facility]: WALLY Miranda-BC is requesting most recent x-rays & OV notes faxed over to facility    Fax Number: [phone]

## 2025-04-22 ENCOUNTER — TRANSCRIBE ORDERS (OUTPATIENT)
Facility: HOSPITAL | Age: 40
End: 2025-04-22

## 2025-04-22 ENCOUNTER — HOSPITAL ENCOUNTER (OUTPATIENT)
Facility: HOSPITAL | Age: 40
Discharge: HOME | End: 2025-04-25
Payer: MEDICARE

## 2025-04-22 DIAGNOSIS — M25.561 PAIN IN JOINT OF RIGHT KNEE: ICD-10-CM

## 2025-04-22 DIAGNOSIS — M25.561 PAIN IN JOINT OF RIGHT KNEE: Primary | ICD-10-CM

## 2025-04-22 PROCEDURE — 73562 X-RAY EXAM OF KNEE 3: CPT

## 2025-04-25 ENCOUNTER — TELEPHONE (OUTPATIENT)
Age: 40
End: 2025-04-25

## 2025-04-25 NOTE — TELEPHONE ENCOUNTER
Pt is requesting medication for knee pain. Pt stated she went to see her pain specialist and got an xray done but still awaiting results.     Pt also stated she is due to leave tomorrow for a cruise trip.   Please advise.     Phone: [phone]

## 2025-04-27 DIAGNOSIS — M25.561 CHRONIC PAIN OF RIGHT KNEE: Primary | ICD-10-CM

## 2025-04-27 DIAGNOSIS — G89.29 CHRONIC PAIN OF RIGHT KNEE: Primary | ICD-10-CM

## 2025-04-27 RX ORDER — OXYCODONE AND ACETAMINOPHEN 5; 325 MG/1; MG/1
1 TABLET ORAL EVERY 8 HOURS PRN
Qty: 8 TABLET | Refills: 0 | Status: SHIPPED | OUTPATIENT
Start: 2025-04-27 | End: 2025-04-30

## 2025-04-28 DIAGNOSIS — M89.9 LESION OF BONE OF KNEE: Primary | ICD-10-CM

## 2025-05-11 ENCOUNTER — HOSPITAL ENCOUNTER (EMERGENCY)
Facility: HOSPITAL | Age: 40
Discharge: HOME | End: 2025-05-12
Attending: EMERGENCY MEDICINE
Payer: MEDICARE

## 2025-05-11 ENCOUNTER — APPOINTMENT (OUTPATIENT)
Facility: HOSPITAL | Age: 40
End: 2025-05-11
Payer: MEDICARE

## 2025-05-11 DIAGNOSIS — R07.9 CHEST PAIN, UNSPECIFIED TYPE: Primary | ICD-10-CM

## 2025-05-11 LAB
ALBUMIN SERPL-MCNC: 3.6 G/DL (ref 3.5–5)
ALBUMIN/GLOB SERPL: 0.8 (ref 1.1–2.2)
ALP SERPL-CCNC: 72 U/L (ref 45–117)
ALT SERPL-CCNC: 28 U/L (ref 12–78)
ANION GAP SERPL CALC-SCNC: 8 MMOL/L (ref 2–12)
AST SERPL-CCNC: 33 U/L (ref 15–37)
BASOPHILS # BLD: 0.02 K/UL (ref 0–0.1)
BASOPHILS NFR BLD: 0.3 % (ref 0–1)
BILIRUB SERPL-MCNC: 0.3 MG/DL (ref 0.2–1)
BUN SERPL-MCNC: 22 MG/DL (ref 6–20)
BUN/CREAT SERPL: 17 (ref 12–20)
CALCIUM SERPL-MCNC: 10.8 MG/DL (ref 8.5–10.1)
CHLORIDE SERPL-SCNC: 102 MMOL/L (ref 97–108)
CO2 SERPL-SCNC: 29 MMOL/L (ref 21–32)
CREAT SERPL-MCNC: 1.3 MG/DL (ref 0.55–1.02)
DIFFERENTIAL METHOD BLD: NORMAL
EOSINOPHIL # BLD: 0.05 K/UL (ref 0–0.4)
EOSINOPHIL NFR BLD: 0.8 % (ref 0–7)
ERYTHROCYTE [DISTWIDTH] IN BLOOD BY AUTOMATED COUNT: 12.5 % (ref 11.5–14.5)
GLOBULIN SER CALC-MCNC: 4.5 G/DL (ref 2–4)
GLUCOSE SERPL-MCNC: 119 MG/DL (ref 65–100)
HCT VFR BLD AUTO: 39.7 % (ref 35–47)
HGB BLD-MCNC: 12.3 G/DL (ref 11.5–16)
IMM GRANULOCYTES # BLD AUTO: 0.03 K/UL (ref 0–0.04)
IMM GRANULOCYTES NFR BLD AUTO: 0.5 % (ref 0–0.5)
LYMPHOCYTES # BLD: 1.03 K/UL (ref 0.8–3.5)
LYMPHOCYTES NFR BLD: 17.5 % (ref 12–49)
MCH RBC QN AUTO: 28 PG (ref 26–34)
MCHC RBC AUTO-ENTMCNC: 31 G/DL (ref 30–36.5)
MCV RBC AUTO: 90.4 FL (ref 80–99)
MONOCYTES # BLD: 0.51 K/UL (ref 0–1)
MONOCYTES NFR BLD: 8.7 % (ref 5–13)
NEUTS SEG # BLD: 4.25 K/UL (ref 1.8–8)
NEUTS SEG NFR BLD: 72.2 % (ref 32–75)
NRBC # BLD: 0 K/UL (ref 0–0.01)
NRBC BLD-RTO: 0 PER 100 WBC
PLATELET # BLD AUTO: 253 K/UL (ref 150–400)
PMV BLD AUTO: 11 FL (ref 8.9–12.9)
POTASSIUM SERPL-SCNC: 4.1 MMOL/L (ref 3.5–5.1)
PROT SERPL-MCNC: 8.1 G/DL (ref 6.4–8.2)
RBC # BLD AUTO: 4.39 M/UL (ref 3.8–5.2)
SODIUM SERPL-SCNC: 139 MMOL/L (ref 136–145)
TROPONIN I SERPL HS-MCNC: 10 NG/L (ref 0–51)
WBC # BLD AUTO: 5.9 K/UL (ref 3.6–11)

## 2025-05-11 PROCEDURE — 84484 ASSAY OF TROPONIN QUANT: CPT

## 2025-05-11 PROCEDURE — 2580000003 HC RX 258: Performed by: EMERGENCY MEDICINE

## 2025-05-11 PROCEDURE — 80053 COMPREHEN METABOLIC PANEL: CPT

## 2025-05-11 PROCEDURE — 71046 X-RAY EXAM CHEST 2 VIEWS: CPT

## 2025-05-11 PROCEDURE — 96374 THER/PROPH/DIAG INJ IV PUSH: CPT

## 2025-05-11 PROCEDURE — 6360000002 HC RX W HCPCS: Performed by: EMERGENCY MEDICINE

## 2025-05-11 PROCEDURE — 6370000000 HC RX 637 (ALT 250 FOR IP): Performed by: EMERGENCY MEDICINE

## 2025-05-11 PROCEDURE — 36415 COLL VENOUS BLD VENIPUNCTURE: CPT

## 2025-05-11 PROCEDURE — 99285 EMERGENCY DEPT VISIT HI MDM: CPT

## 2025-05-11 PROCEDURE — 85025 COMPLETE CBC W/AUTO DIFF WBC: CPT

## 2025-05-11 RX ORDER — ASPIRIN 81 MG/1
324 TABLET, CHEWABLE ORAL
Status: COMPLETED | OUTPATIENT
Start: 2025-05-11 | End: 2025-05-11

## 2025-05-11 RX ADMIN — FAMOTIDINE 20 MG: 10 INJECTION, SOLUTION INTRAVENOUS at 22:42

## 2025-05-11 RX ADMIN — ASPIRIN 324 MG: 81 TABLET, CHEWABLE ORAL at 22:55

## 2025-05-11 ASSESSMENT — PAIN - FUNCTIONAL ASSESSMENT
PAIN_FUNCTIONAL_ASSESSMENT: ACTIVITIES ARE NOT PREVENTED
PAIN_FUNCTIONAL_ASSESSMENT: 0-10

## 2025-05-11 ASSESSMENT — PAIN DESCRIPTION - DESCRIPTORS: DESCRIPTORS: ACHING

## 2025-05-11 ASSESSMENT — PAIN DESCRIPTION - FREQUENCY: FREQUENCY: INTERMITTENT

## 2025-05-11 ASSESSMENT — LIFESTYLE VARIABLES
HOW MANY STANDARD DRINKS CONTAINING ALCOHOL DO YOU HAVE ON A TYPICAL DAY: PATIENT DOES NOT DRINK
HOW OFTEN DO YOU HAVE A DRINK CONTAINING ALCOHOL: NEVER

## 2025-05-11 ASSESSMENT — PAIN SCALES - GENERAL: PAINLEVEL_OUTOF10: 5

## 2025-05-11 ASSESSMENT — PAIN DESCRIPTION - ORIENTATION: ORIENTATION: ANTERIOR

## 2025-05-11 ASSESSMENT — PAIN DESCRIPTION - LOCATION: LOCATION: CHEST

## 2025-05-11 ASSESSMENT — PAIN DESCRIPTION - ONSET: ONSET: ON-GOING

## 2025-05-11 ASSESSMENT — PAIN DESCRIPTION - PAIN TYPE: TYPE: ACUTE PAIN

## 2025-05-12 VITALS
WEIGHT: 270.06 LBS | DIASTOLIC BLOOD PRESSURE: 101 MMHG | SYSTOLIC BLOOD PRESSURE: 164 MMHG | BODY MASS INDEX: 45 KG/M2 | OXYGEN SATURATION: 99 % | HEART RATE: 89 BPM | TEMPERATURE: 97.8 F | RESPIRATION RATE: 23 BRPM | HEIGHT: 65 IN

## 2025-05-12 LAB — TROPONIN I SERPL HS-MCNC: 12 NG/L (ref 0–51)

## 2025-05-12 RX ORDER — FAMOTIDINE 20 MG/1
20 TABLET, FILM COATED ORAL 2 TIMES DAILY
Qty: 20 TABLET | Refills: 0 | Status: SHIPPED | OUTPATIENT
Start: 2025-05-12 | End: 2025-05-22

## 2025-05-12 ASSESSMENT — ENCOUNTER SYMPTOMS
VOMITING: 0
NAUSEA: 0
SHORTNESS OF BREATH: 0

## 2025-05-12 NOTE — ED TRIAGE NOTES
[facility] Nursing Note        Patient Name: Bharath Calderón      : 1985             MRN: [id number]      Chief Complaint:  Chest Pain      Admit Diagnosis: No admission diagnoses are documented for this encounter.      Admitting Provider: No admitting provider for patient encounter.      Surgery: * No surgery found *           Patient arrived to the ER ambulatory from home with complaints of Chest Pain that started today. Pt discloses a Hx of Lupus, GERD and Kidney Transplant.         Lines:        Signed by: Jaron Almanza RN, JOLEEN, BSN, CMSRN                                              2025 at 10:24 PM

## 2025-05-12 NOTE — ED NOTES
Patient discharged by Yohan COX - pt sent to the [facility], via wheelchair due to safety concerns, no acute distress noted at time of discharge - Discharge information / home RX / and reasons to return to the ED were reviewed by the ED provider.      Pt's mother at side for comfort care and for a ride home.

## 2025-05-12 NOTE — ED PROVIDER NOTES
[facility]  EMERGENCY DEPARTMENT ENCOUNTER      Pt Name: Bharath Calderón  MRN: [id number]  Birthdate 1985  Date of evaluation: 5/11/2025  Provider: Adriana Almanzar MD    CHIEF COMPLAINT       Chief Complaint   Patient presents with    Chest Pain         HISTORY OF PRESENT ILLNESS   (Location/Symptom, Timing/Onset, Context/Setting, Quality, Duration, Modifying Factors, Severity)  Note limiting factors.   The history is provided by the patient.   Chest Pain  Pain location:  Epigastric and substernal area  Pain quality: aching    Pain radiates to:  Does not radiate  Pain severity:  Moderate  Onset quality:  Sudden  Duration:  30 minutes  Timing:  Constant  Progression:  Unchanged  Chronicity:  New  Context: at rest    Ineffective treatments:  None tried  Associated symptoms: no diaphoresis, no fever, no nausea, no shortness of breath and no vomiting    Risk factors: no coronary artery disease (Patient underwent evaluation at her cardiologist office and had a negative stress test 3 months ago)          Review of External Medical Records:     Nursing Notes were reviewed.    REVIEW OF SYSTEMS    (2-9 systems for level 4, 10 or more for level 5)     Review of Systems   Constitutional:  Negative for diaphoresis and fever.   Respiratory:  Negative for shortness of breath.    Cardiovascular:  Positive for chest pain.   Gastrointestinal:  Negative for nausea and vomiting.       Except as noted above the remainder of the review of systems was reviewed and negative.       PAST MEDICAL HISTORY     Past Medical History:   Diagnosis Date    Anemia     secondary to lupus    Asthma     no inhaler use in past 2 to 3 years    Carditis     Chronic back pain     Chronic kidney disease     ESRD    Chronic pain     DDD (degenerative disc disease), lumbar     ESRD (end stage renal disease) ([facility])     Fibroid tumor     GERD (gastroesophageal reflux disease)     HCAP (healthcare-associated pneumonia) 7/16/2019

## 2025-05-12 NOTE — ED NOTES
This RN attempted to obtain vitals prior to dc. PT requesting to use restroom during BP. PT then requesting discharge after using restroom. Unable to obtain repeat bp/temp prior to discharge by Last CRANE.

## 2025-05-15 ENCOUNTER — HOSPITAL ENCOUNTER (OUTPATIENT)
Facility: HOSPITAL | Age: 40
Discharge: HOME | End: 2025-05-18
Payer: MEDICARE

## 2025-05-15 DIAGNOSIS — M89.9 LESION OF BONE OF KNEE: ICD-10-CM

## 2025-05-15 PROCEDURE — 73723 MRI JOINT LWR EXTR W/O&W/DYE: CPT

## 2025-05-15 PROCEDURE — A9579 GAD-BASE MR CONTRAST NOS,1ML: HCPCS | Performed by: RADIOLOGY

## 2025-05-15 PROCEDURE — 6360000004 HC RX CONTRAST MEDICATION: Performed by: RADIOLOGY

## 2025-05-15 RX ADMIN — GADOTERIDOL 20 ML: 279.3 INJECTION, SOLUTION INTRAVENOUS at 15:29

## 2025-05-16 LAB
EKG ATRIAL RATE: 77 BPM
EKG DIAGNOSIS: NORMAL
EKG P AXIS: 56 DEGREES
EKG P-R INTERVAL: 142 MS
EKG Q-T INTERVAL: 358 MS
EKG QRS DURATION: 80 MS
EKG QTC CALCULATION (BAZETT): 405 MS
EKG R AXIS: 46 DEGREES
EKG T AXIS: 23 DEGREES
EKG VENTRICULAR RATE: 77 BPM

## 2025-05-22 ENCOUNTER — RESULTS FOLLOW-UP (OUTPATIENT)
Age: 40
End: 2025-05-22

## 2025-06-06 ENCOUNTER — TELEPHONE (OUTPATIENT)
Age: 40
End: 2025-06-06

## 2025-07-02 ENCOUNTER — TELEPHONE (OUTPATIENT)
Age: 40
End: 2025-07-02

## 2025-07-30 ENCOUNTER — TELEPHONE (OUTPATIENT)
Age: 40
End: 2025-07-30

## 2025-08-12 RX ORDER — AMITRIPTYLINE HYDROCHLORIDE 150 MG/1
150 TABLET ORAL NIGHTLY
Qty: 90 TABLET | Refills: 3 | Status: SHIPPED | OUTPATIENT
Start: 2025-08-12

## 2025-08-21 ENCOUNTER — TELEPHONE (OUTPATIENT)
Age: 40
End: 2025-08-21

## 2025-08-27 ENCOUNTER — TELEPHONE (OUTPATIENT)
Age: 40
End: 2025-08-27

## 2025-09-03 ENCOUNTER — OFFICE VISIT (OUTPATIENT)
Age: 40
End: 2025-09-03
Payer: MEDICARE

## 2025-09-03 ENCOUNTER — TELEPHONE (OUTPATIENT)
Age: 40
End: 2025-09-03

## 2025-09-03 VITALS
BODY MASS INDEX: 44.32 KG/M2 | DIASTOLIC BLOOD PRESSURE: 80 MMHG | HEIGHT: 65 IN | WEIGHT: 266 LBS | HEART RATE: 78 BPM | RESPIRATION RATE: 18 BRPM | OXYGEN SATURATION: 97 % | SYSTOLIC BLOOD PRESSURE: 108 MMHG

## 2025-09-03 DIAGNOSIS — M32.9 SYSTEMIC LUPUS ERYTHEMATOSUS, UNSPECIFIED SLE TYPE, UNSPECIFIED ORGAN INVOLVEMENT STATUS (HCC): ICD-10-CM

## 2025-09-03 DIAGNOSIS — Z00.00 MEDICARE ANNUAL WELLNESS VISIT, SUBSEQUENT: Primary | ICD-10-CM

## 2025-09-03 DIAGNOSIS — Z94.0 KIDNEY TRANSPLANTED: ICD-10-CM

## 2025-09-03 DIAGNOSIS — R73.03 PRE-DIABETES: ICD-10-CM

## 2025-09-03 DIAGNOSIS — R56.9 CONVULSIONS, UNSPECIFIED CONVULSION TYPE (HCC): ICD-10-CM

## 2025-09-03 DIAGNOSIS — E66.01 MORBID OBESITY (HCC): ICD-10-CM

## 2025-09-03 DIAGNOSIS — M89.9 LESION OF BONE OF KNEE: ICD-10-CM

## 2025-09-03 DIAGNOSIS — S83.242A ACUTE MEDIAL MENISCUS TEAR OF LEFT KNEE, INITIAL ENCOUNTER: ICD-10-CM

## 2025-09-03 PROBLEM — J96.90 RESPIRATORY FAILURE (HCC): Status: RESOLVED | Noted: 2022-03-20 | Resolved: 2025-09-03

## 2025-09-03 LAB — HBA1C MFR BLD: 6 %

## 2025-09-03 PROCEDURE — PBSHW AMB POC HEMOGLOBIN A1C: Performed by: INTERNAL MEDICINE

## 2025-09-03 PROCEDURE — 3079F DIAST BP 80-89 MM HG: CPT | Performed by: INTERNAL MEDICINE

## 2025-09-03 PROCEDURE — 3074F SYST BP LT 130 MM HG: CPT | Performed by: INTERNAL MEDICINE

## 2025-09-03 PROCEDURE — 83036 HEMOGLOBIN GLYCOSYLATED A1C: CPT | Performed by: INTERNAL MEDICINE

## 2025-09-03 PROCEDURE — G0439 PPPS, SUBSEQ VISIT: HCPCS | Performed by: INTERNAL MEDICINE

## 2025-09-03 ASSESSMENT — PATIENT HEALTH QUESTIONNAIRE - PHQ9
SUM OF ALL RESPONSES TO PHQ QUESTIONS 1-9: 0
SUM OF ALL RESPONSES TO PHQ QUESTIONS 1-9: 0
1. LITTLE INTEREST OR PLEASURE IN DOING THINGS: NOT AT ALL
SUM OF ALL RESPONSES TO PHQ QUESTIONS 1-9: 0
2. FEELING DOWN, DEPRESSED OR HOPELESS: NOT AT ALL
SUM OF ALL RESPONSES TO PHQ QUESTIONS 1-9: 0

## (undated) DEVICE — STERILE POLYISOPRENE POWDER-FREE SURGICAL GLOVES WITH EMOLLIENT COATING: Brand: PROTEXIS

## (undated) DEVICE — SOLUTION IRRIG 1000ML H2O STRL BLT

## (undated) DEVICE — SUTURE VCRL SZ 4-0 L27IN ABSRB UD L19MM PS-2 3/8 CIR PRIM J426H

## (undated) DEVICE — SUTURE VCRL SZ 2-0 L36IN ABSRB VLT L36MM CT-1 1/2 CIR J345H

## (undated) DEVICE — SUTURE VCRL SZ 0 L27IN ABSRB UD L26MM CT-2 1/2 CIR J270H

## (undated) DEVICE — AGENT HEMSTAT W4XL4IN OXIDIZED REGENERATED CELOS ABSRB SFT

## (undated) DEVICE — ELECTRODE ES 36CM LAP FLAT L HK COAT DISP CLEANCOAT

## (undated) DEVICE — SUT PROL 2-0 30IN CT2 BLU --

## (undated) DEVICE — (D)PREP SKN CHLRAPRP APPL 26ML -- CONVERT TO ITEM 371833

## (undated) DEVICE — SUTURE VCRL SZ 3-0 L27IN ABSRB UD L26MM SH 1/2 CIR J416H

## (undated) DEVICE — SYR LR LCK 1ML GRAD NSAF 30ML --

## (undated) DEVICE — Device

## (undated) DEVICE — STERILE POLYISOPRENE POWDER-FREE SURGICAL GLOVES: Brand: PROTEXIS

## (undated) DEVICE — FLOSEAL MATRIX IS INDICATED IN SURGICAL PROCEDURES (OTHER THAN IN OPHTHALMIC) AS AN ADJUNCT TO HEMOSTASIS WHEN CONTROL OF BLEEDING BY LIGATURE OR CONVENTIONALPROCEDURES IS INEFFECTIVE OR IMPRACTICAL.: Brand: FLOSEAL HEMOSTATIC MATRIX

## (undated) DEVICE — TOWEL SURG W17XL27IN STD BLU COT NONFENESTRATED PREWASHED

## (undated) DEVICE — SUTURE PERMA-HAND 0 L18IN NONABSORBABLE BLK CT-1 L36MM 1/2 C021D

## (undated) DEVICE — NEEDLE HYPO 22GA L1.5IN BLK S STL HUB POLYPR SHLD REG BVL

## (undated) DEVICE — SUTURE NONABSORBABLE MONOFILAMENT 5-0 CV-6 TTC-9 24 IN GORTX 6K02A

## (undated) DEVICE — SURGICAL PROCEDURE KIT GEN LAPAROSCOPY LF

## (undated) DEVICE — SUTURE PROL 5-0 L18IN NONABSORBABLE BLU RB-2 L13MM 1/2 CIR 8713H

## (undated) DEVICE — KENDALL SCD EXPRESS SLEEVES, KNEE LENGTH, MEDIUM: Brand: KENDALL SCD

## (undated) DEVICE — INSUFFLATION NEEDLE: Brand: SURGINEEDLE

## (undated) DEVICE — Z CONVERTED USE 2271148 CONNECTOR TBNG POLYPR 5IN1 TOUGH SHATTERPROOF FOR 5-11MM TB

## (undated) DEVICE — SYR 10ML LUER LOK 1/5ML GRAD --

## (undated) DEVICE — UNIVERSAL FIXATION CANNULA: Brand: VERSAONE

## (undated) DEVICE — INFECTION CONTROL KIT SYS

## (undated) DEVICE — STERILE SLEEVE: Brand: CONVERTORS

## (undated) DEVICE — GOWN,SIRUS,NONRNF,SETINSLV,2XL,18/CS: Brand: MEDLINE

## (undated) DEVICE — SHEAR RMFG HARMONIC FOCUS 9CM -- OEM ITEM L#322125

## (undated) DEVICE — REM POLYHESIVE ADULT PATIENT RETURN ELECTRODE: Brand: VALLEYLAB

## (undated) DEVICE — DEVON™ KNEE AND BODY STRAP 60" X 3" (1.5 M X 7.6 CM): Brand: DEVON

## (undated) DEVICE — DRAPE PRB US TRNSDCR 6X96IN --

## (undated) DEVICE — DRAPE,REIN 53X77,STERILE: Brand: MEDLINE

## (undated) DEVICE — MAGNETIC DRAPE: Brand: DEVON

## (undated) DEVICE — PAD 05IN BASE 3IN PEAK M DENS CONVOLUTED FOAM

## (undated) DEVICE — DRAIN SURG 19FR L025IN DIA63MM SIL CHN RND FULL FLUT CLS

## (undated) DEVICE — SWAB CULT DBL W/O CHAR RAYON TIP AMIES GEL CLMN FOR COLL

## (undated) DEVICE — CONTAINER,SPECIMEN,3OZ,OR STRL: Brand: MEDLINE

## (undated) DEVICE — SUTURE PERMAHAND SZ 4-0 L12X30IN NONABSORBABLE BLK SILK A303H

## (undated) DEVICE — SUTURE PROL SZ 0 L30IN NONABSORBABLE BLU L26MM CT-2 1/2 CIR 8412H

## (undated) DEVICE — GAUZE SPONGES,12 PLY: Brand: CURITY

## (undated) DEVICE — SUT ETHLN 3-0 18IN PS2 BLK --

## (undated) DEVICE — COVER LT HNDL BLU PLAS

## (undated) DEVICE — SOLUTION IV 1000ML 0.9% SOD CHL

## (undated) DEVICE — SPONGE GZ W4XL4IN COT 12 PLY TYP VII WVN C FLD DSGN

## (undated) DEVICE — HANDLE LT SNAP ON ULT DURABLE LENS FOR TRUMPF ALC DISPOSABLE

## (undated) DEVICE — SPONGE TONSIL MED X RAY DETECTABLE STRL LTX FREE

## (undated) DEVICE — SUTURE VCRL SZ 2-0 L27IN ABSRB VLT L26MM SH 1/2 CIR J317H

## (undated) DEVICE — X-RAY SPONGES,16 PLY: Brand: DERMACEA

## (undated) DEVICE — CATHETER THORACENTESIS STR 28 FRX23 IN 6 EYELET TAPR TIP LF

## (undated) DEVICE — I.V. DRAIN SPONGES: Brand: DERMACEA

## (undated) DEVICE — SUTURE MCRYL SZ 4-0 L27IN ABSRB UD L19MM PS-2 1/2 CIR PRIM Y426H

## (undated) DEVICE — SUTURE GORTX SZ 4-0 L24IN NONABSORBABLE L13MM PT-13 3/8 CIR 5K08A

## (undated) DEVICE — SUTURE SZ 0 27IN 5/8 CIR UR-6  TAPER PT VIOLET ABSRB VICRYL J603H

## (undated) DEVICE — ROCKER SWITCH PENCIL BLADE ELECTRODE, HOLSTER: Brand: EDGE

## (undated) DEVICE — SOL IRR SOD CL 0.9% 3000ML BG --

## (undated) DEVICE — BLADELESS OPTICAL TROCAR WITH FIXATION CANNULA: Brand: VERSAPORT

## (undated) DEVICE — DERMABOND SKIN ADH 0.7ML -- DERMABOND ADVANCED 12/BX

## (undated) DEVICE — DBD-PACK,LAPAROTOMY,2 REINFORCED GOWNS: Brand: MEDLINE

## (undated) DEVICE — SUTURE PERMA-HAND SZ 3-0 L30IN NONABSORBABLE BLK BB L17MM K882H

## (undated) DEVICE — TELFA NON-ADHERENT ABSORBENT DRESSING: Brand: TELFA

## (undated) DEVICE — NEEDLE SPNL 22GA L3.5IN BLK HUB S STL REG WALL FIT STYL W/

## (undated) DEVICE — SOLUTION IV 500ML 0.9% SOD CHL FLX CONT

## (undated) DEVICE — SUTURE PERMAHAND SZ 0 L30IN NONABSORBABLE BLK SILK BRAID A306H

## (undated) DEVICE — 3M™ IOBAN™ 2 ANTIMICROBIAL INCISE DRAPE 6648EZ: Brand: IOBAN™ 2

## (undated) DEVICE — GAUZE SPONGES,8 PLY: Brand: CURITY

## (undated) DEVICE — APPLICATOR BNDG 1MM ADH PREMIERPRO EXOFIN

## (undated) DEVICE — SUT PROL 6-0 18IN BV1 DA BLU --

## (undated) DEVICE — SUTURE PROL SZ 5-0 L30IN NONABSORBABLE BLU L13MM RB-2 1/2 8710H

## (undated) DEVICE — 1200 GUARD II KIT W/5MM TUBE W/O VAC TUBE: Brand: GUARDIAN

## (undated) DEVICE — TRAP SUC MUCOUS 70ML -- MEDICHOICE MEDLINE

## (undated) DEVICE — SOL IRRIGATION INJ NACL 0.9% 500ML BTL

## (undated) DEVICE — TIP SUCT BLU PLAS BLB W/O CTRL VENT YANK

## (undated) DEVICE — NEEDLE HYPO 25GA L1.5IN BVL ORIENTED ECLIPSE

## (undated) DEVICE — SLIM BODY SKIN STAPLER: Brand: APPOSE ULC

## (undated) DEVICE — TUBING HYDR IRR --

## (undated) DEVICE — BLADE ELECTRODE: Brand: EDGE

## (undated) DEVICE — MEDI-VAC NON-CONDUCTIVE SUCTION TUBING: Brand: CARDINAL HEALTH

## (undated) DEVICE — SUTURE PERMAHAND SZ 3-0 L30IN NONABSORBABLE BLK SILK BRAID A304H

## (undated) DEVICE — SYR 3ML LL TIP 1/10ML GRAD --

## (undated) DEVICE — 3000CC GUARDIAN II: Brand: GUARDIAN

## (undated) DEVICE — SUTURE VCRL SZ 0 L18IN ABSRB UD POLYGLACTIN 910 COAT BRAID J646H

## (undated) DEVICE — SUT PROL 6-0 24IN BV1 DA BLU --

## (undated) DEVICE — SURGICAL PROCEDURE PACK BASIN MAJ SET CUST NO CAUT

## (undated) DEVICE — CLICKLINE SCISSORS INSERT: Brand: CLICKLINE

## (undated) DEVICE — SUTURE VCRL SZ 3-0 L27IN ABSRB UD FS-2 L19MM 1/2 CIR J423H

## (undated) DEVICE — 3M™ TEGADERM™ TRANSPARENT FILM DRESSING FRAME STYLE, 1626W, 4 IN X 4-3/4 IN (10 CM X 12 CM), 50/CT 4CT/CASE: Brand: 3M™ TEGADERM™

## (undated) DEVICE — TUBING INSUFLTN 10FT LUER -- CONVERT TO ITEM 368568

## (undated) DEVICE — SUT SLK 2-0SH 30IN BLK --

## (undated) DEVICE — SUT ETHLN 2-0 18IN FS BLK --